# Patient Record
Sex: FEMALE | Race: BLACK OR AFRICAN AMERICAN | NOT HISPANIC OR LATINO | Employment: OTHER | URBAN - METROPOLITAN AREA
[De-identification: names, ages, dates, MRNs, and addresses within clinical notes are randomized per-mention and may not be internally consistent; named-entity substitution may affect disease eponyms.]

---

## 2017-01-04 ENCOUNTER — OFFICE VISIT (OUTPATIENT)
Dept: HEMATOLOGY/ONCOLOGY | Facility: CLINIC | Age: 72
End: 2017-01-04
Payer: MEDICARE

## 2017-01-04 VITALS
HEART RATE: 89 BPM | SYSTOLIC BLOOD PRESSURE: 146 MMHG | BODY MASS INDEX: 24.54 KG/M2 | WEIGHT: 125.69 LBS | OXYGEN SATURATION: 97 % | DIASTOLIC BLOOD PRESSURE: 88 MMHG | TEMPERATURE: 99 F

## 2017-01-04 DIAGNOSIS — D63.1 ANEMIA IN CHRONIC KIDNEY DISEASE: Primary | ICD-10-CM

## 2017-01-04 DIAGNOSIS — N18.9 ANEMIA IN CHRONIC KIDNEY DISEASE: Primary | ICD-10-CM

## 2017-01-04 PROCEDURE — 3079F DIAST BP 80-89 MM HG: CPT | Mod: S$GLB,,, | Performed by: INTERNAL MEDICINE

## 2017-01-04 PROCEDURE — 1159F MED LIST DOCD IN RCRD: CPT | Mod: S$GLB,,, | Performed by: INTERNAL MEDICINE

## 2017-01-04 PROCEDURE — 1157F ADVNC CARE PLAN IN RCRD: CPT | Mod: S$GLB,,, | Performed by: INTERNAL MEDICINE

## 2017-01-04 PROCEDURE — 99999 PR PBB SHADOW E&M-EST. PATIENT-LVL IV: CPT | Mod: PBBFAC,,, | Performed by: INTERNAL MEDICINE

## 2017-01-04 PROCEDURE — 1160F RVW MEDS BY RX/DR IN RCRD: CPT | Mod: S$GLB,,, | Performed by: INTERNAL MEDICINE

## 2017-01-04 PROCEDURE — 1126F AMNT PAIN NOTED NONE PRSNT: CPT | Mod: S$GLB,,, | Performed by: INTERNAL MEDICINE

## 2017-01-04 PROCEDURE — 99213 OFFICE O/P EST LOW 20 MIN: CPT | Mod: S$GLB,,, | Performed by: INTERNAL MEDICINE

## 2017-01-04 PROCEDURE — 99499 UNLISTED E&M SERVICE: CPT | Mod: S$GLB,,, | Performed by: INTERNAL MEDICINE

## 2017-01-04 PROCEDURE — 3077F SYST BP >= 140 MM HG: CPT | Mod: S$GLB,,, | Performed by: INTERNAL MEDICINE

## 2017-01-04 RX ORDER — FEXOFENADINE HCL AND PSEUDOEPHEDRINE HCI 180; 240 MG/1; MG/1
TABLET, EXTENDED RELEASE ORAL
COMMUNITY
Start: 2016-12-13 | End: 2017-03-09 | Stop reason: SDUPTHER

## 2017-01-04 NOTE — PROGRESS NOTES
Subjective:       Patient ID: Regino Lawrence is a 71 y.o. female.    Chief Complaint: Follow-up  Diagnosis: Anemia in CKD  Patient is a Anabaptism  .  HPI    The patient is seen today for chronic anemia in CKD. The patient reports that she has been diagnosed with JOLLY in the past.  She has been on oral iron supplementation therapy, but could not tolerate or did not respond, she is uncertain.  She is followed by GI and has undergone a colonoscopy earlier this year and was diagnosed with hemorrhoids for which she underwent banding procedure.  No melena, hematochezia,change in bowel habits.  She has also been diagnosed with B12 deficiency in the past, but reports she did not respond to B12 injections. No history of blood transfusions.  She is a Anabaptism.  She reports that she remembers getting injections in the 1970s when her blood count was low.         She is followed by  and sp colpocleisis, perineorrhaphy and levatorplasty, transobturator sling and cystoscopy for uterovaginal prolapse    Today, she is doing well.  She continues to undergo weekly Procrit therapy   She undergoes intermittent IV iron therapy  No fatigue  No SOB/CP/NV   She has chronic back pain and mild arthralgias- unchanged    CBC reveals Hb 11.6g/dl    PAST MEDICAL HISTORY:  Acid reflux, alopecia, anemia, anxiety disorder, chronic  kidney disease, depression, diabetes mellitus type 2, hyperlipidemia,  hypertension, hypothyroidism, osteoporosis, sarcoidosis.    PAST SURGICAL HISTORY:  Cholecystectomy, , tubal ligation, carpal tunnel release, cataracts.    FAMILY HISTORY: Unremarkable for cancer. Significant for HTN.       Review of Systems   Constitutional: Negative for activity change, appetite change, chills, diaphoresis and fatigue.   HENT: Negative for hearing loss, nosebleeds and rhinorrhea.    Eyes: Negative for visual disturbance.   Respiratory: Negative for cough, chest tightness, shortness of breath and wheezing.     Cardiovascular: Negative for chest pain, palpitations and leg swelling.   Gastrointestinal: Negative for abdominal distention, abdominal pain, blood in stool, constipation, diarrhea and nausea.   Genitourinary: Positive for difficulty urinating. Negative for flank pain, frequency, hematuria and urgency.        Indwelling cath   Musculoskeletal: Positive for arthralgias and back pain (chronic - stable). Negative for gait problem and neck pain.   Skin: Negative for rash.        No petechiae, ecchymoses   Neurological: Negative for tremors, seizures, syncope, speech difficulty, light-headedness and headaches.   Hematological: Negative for adenopathy. Does not bruise/bleed easily.       Objective:       Vitals:    01/04/17 1458 01/04/17 1503   BP: (!) 140/90 (!) 146/88   BP Location: Left arm Left arm   Patient Position: Sitting Sitting   BP Method: Manual Manual   Pulse: 89    Temp: 98.6 °F (37 °C)    TempSrc: Oral    SpO2: 97%    Weight: 57 kg (125 lb 10.6 oz)        Physical Exam   Constitutional: She is oriented to person, place, and time. She appears well-developed and well-nourished.   HENT:   Head: Normocephalic.   Mouth/Throat: Oropharynx is clear and moist. No oropharyngeal exudate.   Eyes: Conjunctivae and lids are normal. Pupils are equal, round, and reactive to light. No scleral icterus.   Neck: Normal range of motion. Neck supple. No thyromegaly present.   Cardiovascular: Normal rate, regular rhythm and normal heart sounds.    No murmur heard.  Pulmonary/Chest: Breath sounds normal. She has no wheezes. She has no rales.   Abdominal: Soft. Bowel sounds are normal. She exhibits no distension and no mass. There is no hepatosplenomegaly. There is no tenderness. There is no rebound and no guarding.   Musculoskeletal: Normal range of motion. She exhibits no edema or tenderness.   Lymphadenopathy:     She has no cervical adenopathy.     She has no axillary adenopathy.        Right: No supraclavicular adenopathy  present.        Left: No supraclavicular adenopathy present.   Neurological: She is alert and oriented to person, place, and time. No cranial nerve deficit. Coordination normal.   Skin: Skin is warm and dry. No ecchymosis, no petechiae and no rash noted. No erythema.   Psychiatric: She has a normal mood and affect.         Results for CANDELARIA SANTANA (MRN [id number]) as of 8/28/2016 14:24   Ref. Range 10/14/2015 08:18 12/8/2015 11:33 3/7/2016 12:08   Vitamin B-12 Latest Ref Range: 210 - 950 pg/mL [phone]       Results for CANDELARIA SANTANA (MRN [id number]) as of 9/22/2016 10:20   Ref. Range 8/17/2016 14:30   Retic Latest Ref Range: 0.5 - 2.5 % 2.8 (H)   Sed Rate Latest Ref Range: 0 - 20 mm/Hr 31 (H)     Lab Results   Component Value Date    WBC 6.17 12/23/2016    HGB 11.6 (L) 12/23/2016    HCT 36.4 (L) 12/23/2016    MCV 98 12/23/2016     12/23/2016     Lab Results   Component Value Date    IRON 50 12/23/2016    TIBC 426 12/23/2016    FERRITIN 113 12/23/2016     SPEP-nl    Results for CANDELARIA SANTANA (MRN [id number]) as of 9/22/2016 10:20   Ref. Range 8/17/2016 14:30   [place]wey Free Light Chains Latest Ref Range: 0.33 - 1.94 mg/dL 5.75 (H)   Lambda Free Light Chains Latest Ref Range: 0.57 - 2.63 mg/dL 4.55 (H)   Kappa/Lambda FLC Ratio Latest Ref Range: 0.26 - 1.65  1.26       Results for CANDELARIA SANTANA (MRN [id number]) as of 1/4/2017 15:14   Ref. Range 12/16/2016 10:14   Sed Rate Latest Ref Range: 0 - 20 mm/Hr 43 (H)     Results for CANDELARIA SANTANA (MRN [id number]) as of 1/4/2017 15:14   Ref. Range 12/23/2016 10:20   Iron Latest Ref Range: 30 - 160 ug/dL 50   TIBC Latest Ref Range: 250 - 450 ug/dL 426   Saturated Iron Latest Ref Range: 20 - 50 % 12 (L)   Transferrin Latest Ref Range: 200 - 375 mg/dL 288   Ferritin Latest Ref Range: 20.0 - 300.0 ng/mL 113         Assessment:       1. Anemia in chronic kidney disease    2. Patient is Rastafarian        Plan:   1.,2. Pt clinically stable  S/p IV Iron therapy   Hb  11.6g/dl  Ferritin 113  Pt is a Yazidi and declines/not interested in blood and blood products due to Voodoo beliefs  Plan change frequency of   Procrit therapy to 10,000u q 2wks ( pending lab parameters)  CBC q 2wks  F/u 2 mos with Fe studies        CC: Micaela Mendoza M.D.

## 2017-01-04 NOTE — MR AVS SNAPSHOT
[facility]  [address] [place] [address]  Phone: [phone]                  Regino Lawrence   2017 3:00 PM   Office Visit    Description:  Female : 1945   Provider:  Edith Pacheco MD   [facility]           Reason for Visit     Follow-up           Diagnoses this Visit        Comments    Anemia in chronic kidney disease    -  Primary     Patient is Protestant                To Do List           Future Appointments        Provider Department Dept Phone    2017 10:30 AM CHAIR 01 [facility] [phone]    2017 8:00 AM [facility] US 11 [facility] [phone]    2017 10:00 AM Lindsey Jenkins NP [facility] [phone]    3/6/2017 10:00 AM [facility] [phone]    3/9/2017 10:30 AM Edith [facility] [phone]      Goals (5 Years of Data)              11/29/16    10/17/16    3/7/16    COMPLETED: HDL > 40           Related Problems    Combined 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    HEMOGLOBIN A1C < 7.0   5.6  5.6  5.7    Related Problems    Diabetes mellitus type 2, controlled    COMPLETED: LDL CHOLESTEROL < 70           Related Problems    Combined 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      Follow-Up and Disposition     Return in about 2 months (around 3/4/2017).      [facility]     [facility] -  Assistance  Registered nurses in the [facility] provide clinical advisement, health education, appointment booking, and other advisory services.  Call for this free service at [phone].             Medications           Message regarding Medications     Verify the changes and/or additions to your medication regime listed below are the same as discussed with your clinician today.  If any of these  changes or additions are incorrect, please notify your healthcare provider.             Verify that the below list of medications is an accurate representation of the medications you are currently taking.  If none reported, the list may be blank. If incorrect, please contact your healthcare provider. Carry this list with you in case of emergency.           Current Medications     ACCU-CHEK FASTCLIX Kit Use as directed.    ALCOHOL ANTISEPTIC PADS (ALCOHOL PREP PADS TOP)     blood sugar diagnostic Strp 1 each by Misc.(Non-Drug; Combo Route) route once daily.    blood-glucose meter kit Use as instructed    calcium carbonate (OS-MALCOLM) 500 mg calcium (1,250 mg) tablet Take 1 tablet (500 mg total) by mouth 2 (two) times daily.    cloNIDine (CATAPRES) 0.3 MG tablet Take 1 tablet (0.3 mg total) by mouth 3 (three) times daily.    cyclobenzaprine (FLEXERIL) 10 MG tablet TAKE 1 TABLET THREE TIMES DAILY    fenofibrate 160 MG Tab TAKE 1 TABLET ONE TIME DAILY    fexofenadine-pseudoephedrine (ALLEGRA-D 24) 180-240 mg per 24 hr tablet     fexofenadine-pseudoephedrine (ALLEGRA-D 24) 180-240 mg per 24 hr tablet     gabapentin (NEURONTIN) 400 MG capsule Take 1 capsule (400 mg total) by mouth every evening.    lancing device (ACCU-CHEK SOFTCLIX LANCET DEV) Misc Accu-chek FastClix kit    levothyroxine (SYNTHROID) 50 MCG tablet TAKE 1 TABLET (50 MCG TOTAL) BY MOUTH BEFORE BREAKFAST.    lisinopril (PRINIVIL,ZESTRIL) 20 MG tablet TAKE 1 TABLET ONE TIME DAILY    metformin (GLUCOPHAGE) 1000 MG tablet Take 1 tablet (1,000 mg total) by mouth 2 (two) times daily with meals.    METHYL SALICYLATE/MENTH/CAMPH (MUSCLE RUB, WITH CAMPHOR, TOP)     nifedipine (NIFEDICAL XL) 60 MG (OSM) 24 hr tablet Take 1 tablet (60 mg total) by mouth once daily.    ondansetron (ZOFRAN) 8 MG tablet Take 1 tablet (8 mg total) by mouth every 12 (twelve) hours as needed for Nausea.    potassium chloride SA (K-DUR,KLOR-CON) 20 MEQ tablet Take 1 tablet (20 mEq total) by  mouth 2 (two) times daily.    predniSONE (DELTASONE) 10 MG tablet Take 1 tablet (10 mg total) by mouth once daily.    senna-docusate 8.6-50 mg (PERICOLACE) 8.6-50 mg per tablet Take 1 tablet by mouth 2 (two) times daily as needed for Constipation.    tramadol (ULTRAM) 50 mg tablet Take 1 tablet (50 mg total) by mouth every 8 (eight) hours as needed.    conjugated estrogens (PREMARIN) vaginal cream Place 0.5 g vaginally 3 (three) times a week. Apply by fingertip application           Clinical Reference Information           Vital Signs - Last Recorded  Most recent update: 1/4/2017  3:03 PM by Iris Hernández LPN    BP Pulse Temp Wt LMP SpO2    (!) 146/88 (BP Location: Left arm, Patient Position: Sitting, BP Method: Manual) 89 98.6 °F (37 °C) (Oral) 57 kg (125 lb 10.6 oz) (LMP Unknown) 97%    BMI                24.54 kg/m2          Blood Pressure          Most Recent Value    BP  (!)  146/88      Allergies as of 1/4/2017     Azathioprine      Immunizations Administered on Date of Encounter - 1/4/2017     None      Orders Placed During Today's Visit     Future Labs/Procedures Expected by Expires    Ferritin  1/4/2017 1/4/2018    Iron and TIBC  1/4/2017 1/4/2018

## 2017-01-12 ENCOUNTER — HOSPITAL ENCOUNTER (OUTPATIENT)
Dept: RADIOLOGY | Facility: HOSPITAL | Age: 72
Discharge: HOME | End: 2017-01-12
Attending: OBSTETRICS & GYNECOLOGY
Payer: MEDICARE

## 2017-01-12 DIAGNOSIS — N95.0 PMB (POSTMENOPAUSAL BLEEDING): ICD-10-CM

## 2017-01-12 PROCEDURE — 76856 US EXAM PELVIC COMPLETE: CPT | Mod: 26,,, | Performed by: RADIOLOGY

## 2017-01-12 PROCEDURE — 76830 TRANSVAGINAL US NON-OB: CPT | Mod: 26,,, | Performed by: RADIOLOGY

## 2017-01-12 PROCEDURE — 76856 US EXAM PELVIC COMPLETE: CPT | Mod: TC

## 2017-01-13 ENCOUNTER — OFFICE VISIT (OUTPATIENT)
Dept: UROGYNECOLOGY | Facility: CLINIC | Age: 72
End: 2017-01-13
Attending: OBSTETRICS & GYNECOLOGY
Payer: MEDICARE

## 2017-01-13 ENCOUNTER — INFUSION (OUTPATIENT)
Dept: INFUSION THERAPY | Facility: HOSPITAL | Age: 72
End: 2017-01-13
Attending: INTERNAL MEDICINE
Payer: MEDICARE

## 2017-01-13 VITALS — DIASTOLIC BLOOD PRESSURE: 73 MMHG | RESPIRATION RATE: 16 BRPM | SYSTOLIC BLOOD PRESSURE: 117 MMHG | HEART RATE: 84 BPM

## 2017-01-13 VITALS
HEIGHT: 60 IN | DIASTOLIC BLOOD PRESSURE: 70 MMHG | WEIGHT: 124.56 LBS | BODY MASS INDEX: 24.45 KG/M2 | SYSTOLIC BLOOD PRESSURE: 142 MMHG

## 2017-01-13 DIAGNOSIS — N18.9 ANEMIA IN CHRONIC KIDNEY DISEASE: Primary | ICD-10-CM

## 2017-01-13 DIAGNOSIS — Z98.890 POST-OPERATIVE STATE: Primary | ICD-10-CM

## 2017-01-13 DIAGNOSIS — N39.41 URGE INCONTINENCE: ICD-10-CM

## 2017-01-13 DIAGNOSIS — N95.2 VAGINAL ATROPHY: ICD-10-CM

## 2017-01-13 DIAGNOSIS — Z53.1 REFUSAL OF BLOOD TRANSFUSIONS AS PATIENT IS JEHOVAH'S WITNESS: ICD-10-CM

## 2017-01-13 DIAGNOSIS — D63.1 ANEMIA IN CHRONIC KIDNEY DISEASE: Primary | ICD-10-CM

## 2017-01-13 LAB
BILIRUB SERPL-MCNC: ABNORMAL MG/DL
BLOOD URINE, POC: ABNORMAL
COLOR, POC UA: YELLOW
GLUCOSE UR QL STRIP: 50
KETONES UR QL STRIP: ABNORMAL
LEUKOCYTE ESTERASE URINE, POC: ABNORMAL
NITRITE, POC UA: ABNORMAL
PH, POC UA: 5
PROTEIN, POC: ABNORMAL
SPECIFIC GRAVITY, POC UA: 1.01
UROBILINOGEN, POC UA: ABNORMAL

## 2017-01-13 PROCEDURE — 63600175 PHARM REV CODE 636 W HCPCS: Performed by: INTERNAL MEDICINE

## 2017-01-13 PROCEDURE — 96372 THER/PROPH/DIAG INJ SC/IM: CPT

## 2017-01-13 PROCEDURE — 81001 URINALYSIS AUTO W/SCOPE: CPT | Mod: S$GLB,,, | Performed by: NURSE PRACTITIONER

## 2017-01-13 PROCEDURE — 99999 PR PBB SHADOW E&M-EST. PATIENT-LVL IV: CPT | Mod: PBBFAC,,, | Performed by: NURSE PRACTITIONER

## 2017-01-13 PROCEDURE — 99024 POSTOP FOLLOW-UP VISIT: CPT | Mod: S$GLB,,, | Performed by: NURSE PRACTITIONER

## 2017-01-13 PROCEDURE — 51701 INSERT BLADDER CATHETER: CPT | Mod: S$GLB,,, | Performed by: NURSE PRACTITIONER

## 2017-01-13 RX ADMIN — ERYTHROPOIETIN 10000 UNITS: 10000 INJECTION, SOLUTION INTRAVENOUS; SUBCUTANEOUS at 11:01

## 2017-01-13 NOTE — PROGRESS NOTES
Urogyn follow up    .  [facility]  [address] [facility] [address]    Regino Lawrence  [id number]  1945      Regino Lawrence is a 71 y.o.  here for a post op visit    PROCEDURE DATE: 2016        PROCEDURE: [place] Colpocleisis, Perineorrhaphy and Levatorplasty , Trans-oburator  sling, Cystoscopy    Past Medical History   Diagnosis Date    Acid reflux     Allergy     Alopecia     Anemia     Anxiety     Arthritis     Cataract     Chronic kidney disease     Depression     Diabetes mellitus, type 2     Eye injury as a child      k-abrasion  od    Hyperlipidemia     Hypertension     Hypothyroidism     Myalgia and myositis 2012    Osteoporosis     Sarcoidosis     Ulcer      no cancer       Past Surgical History   Procedure Laterality Date    Cholecystectomy       section      Tubal ligation      Carpal tunnel release       Rt wrist    Cataract extraction w/  intraocular lens implant Right 2015     Dr. Azevedo    Cataract extraction w/  intraocular lens implant Left 2015     Dr. Azevedo     History since last visit: Denies pain, bleeding, or discharge.  Bladder issues: Denies GISSELLE.  Rare UUI if waits to use the restroom.   Bowel issues: Denies constipation or straining.    Current Outpatient Prescriptions   Medication Sig    ACCU-CHEK FASTCLIX Kit Use as directed.    ALCOHOL ANTISEPTIC PADS (ALCOHOL PREP PADS TOP)     blood sugar diagnostic Strp 1 each by Misc.(Non-Drug; Combo Route) route once daily.    blood-glucose meter kit Use as instructed    cyclobenzaprine (FLEXERIL) 10 MG tablet TAKE 1 TABLET THREE TIMES DAILY    fenofibrate 160 MG Tab TAKE 1 TABLET ONE TIME DAILY    fexofenadine-pseudoephedrine (ALLEGRA-D 24) 180-240 mg per 24 hr tablet     fexofenadine-pseudoephedrine (ALLEGRA-D 24) 180-240 mg per 24 hr tablet     lancing device (ACCU-CHEK SOFTCLIX LANCET DEV) Misc Accu-chek FastClix kit     levothyroxine (SYNTHROID) 50 MCG tablet TAKE 1 TABLET (50 MCG TOTAL) BY MOUTH BEFORE BREAKFAST.    lisinopril (PRINIVIL,ZESTRIL) 20 MG tablet TAKE 1 TABLET ONE TIME DAILY    metformin (GLUCOPHAGE) 1000 MG tablet Take 1 tablet (1,000 mg total) by mouth 2 (two) times daily with meals.    METHYL SALICYLATE/MENTH/CAMPH (MUSCLE RUB, WITH CAMPHOR, TOP)     nifedipine (NIFEDICAL XL) 60 MG (OSM) 24 hr tablet Take 1 tablet (60 mg total) by mouth once daily.    ondansetron (ZOFRAN) 8 MG tablet Take 1 tablet (8 mg total) by mouth every 12 (twelve) hours as needed for Nausea.    potassium chloride SA (K-DUR,KLOR-CON) 20 MEQ tablet Take 1 tablet (20 mEq total) by mouth 2 (two) times daily. (Patient taking differently: Take 20 mEq by mouth once daily. )    predniSONE (DELTASONE) 10 MG tablet Take 1 tablet (10 mg total) by mouth once daily.    senna-docusate 8.6-50 mg (PERICOLACE) 8.6-50 mg per tablet Take 1 tablet by mouth 2 (two) times daily as needed for Constipation.    tramadol (ULTRAM) 50 mg tablet Take 1 tablet (50 mg total) by mouth every 8 (eight) hours as needed.    calcium carbonate (OS-MALCOLM) 500 mg calcium (1,250 mg) tablet Take 1 tablet (500 mg total) by mouth 2 (two) times daily.    cloNIDine (CATAPRES) 0.3 MG tablet Take 1 tablet (0.3 mg total) by mouth 3 (three) times daily.    conjugated estrogens (PREMARIN) vaginal cream Place 0.5 g vaginally 3 (three) times a week. Apply by fingertip application    gabapentin (NEURONTIN) 400 MG capsule Take 1 capsule (400 mg total) by mouth every evening.     No current facility-administered medications for this visit.      ROS:  As per HPI.      Exam  Visit Vitals    BP (!) 142/70    Ht 5' (1.524 m)    Wt 56.5 kg (124 lb 9 oz)    LMP  (LMP Unknown)    BMI 24.33 kg/m2     General: alert and oriented, no acute distress  Respiratory: normal respiratory effort  Abd: soft, non-tender, non-distended    Pelvic  Ext. Genitalia: normal external genitalia. Normal  bartholin's and skeens glands  Vagina: + atrophy. Normal vaginal mucosa without lesions. No discharge noted.   Non-tender bladder base without palpable mass.  A/p colpocleisis.  Well healed.   Urethra: no masses or tenderness  Urethral meatus: no lesions, caruncle or prolapse.    POP-Q:  No obvious prolapse    PVR 5 cc    Impression  1. Post-operative state     2. Urge incontinence  POCT urinalysis, dipstick or tablet reag   3. Vaginal atrophy       We reviewed the above issues and discussed options for short-term versus long-term management of her problems.   Plan:   1. Post op healing well.   2. Continue to control bowel movements.  3. Cystic structure within the left adnexa-- will repeat ultrasound in one year  4. She will follow up with us in 6 months.  30 minutes were spent in face to face time with this patient  75 % of this time was spent in counseling and/or coordination of care    Lindsey Jenkins, CARLOS-BC [facility]  Division of Female Pelvic Medicine and Reconstructive Surgery  Department of Obstetrics & Gynecology

## 2017-01-13 NOTE — PATIENT INSTRUCTIONS
1. Post op healing well.   2. Continue to control bowel movements.  3. Cystic structure within the left adnexa-- will repeat ultrasound in one year  4. She will follow up with us in 6 months.

## 2017-01-13 NOTE — MR AVS SNAPSHOT
[facility]  [address] [facility] [address]  Phone: [phone]                  Regino Lawrence   2017 1:00 PM   Office Visit    Description:  Female : 1945   Provider:  Lindsey Jenkins NP   Department:  [facility]           Reason for Visit     Follow-up                To Do List           Future Appointments        Provider Department Dept Phone    2017 9:50 AM [facility] [phone]    2017 10:00 AM CHAIR 02 [facility] [phone]    3/6/2017 10:00 AM [facility] [phone]    3/9/2017 10:30 AM Ed[facility]co [facility] [phone]    2017 11:00 AM Leti Ruggiero MD [facility] [phone]      Goals (5 Years of Data)              11/29/16    10/17/16    3/7/16    COMPLETED: HDL > 40           Related Problems    Combined 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    HEMOGLOBIN A1C < 7.0   5.6  5.6  5.7    Related Problems    Diabetes mellitus type 2, controlled    COMPLETED: LDL CHOLESTEROL < 70           Related Problems    Combined 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      [facility]     [facility] Care [facility] - / Assistance  Registered nurses in the [facility] provide clinical advisement, health education, appointment booking, and other advisory services.  Call for this free service at [phone].             Medications           Message regarding Medications     Verify the changes and/or additions to your medication regime listed below are the same as discussed with your clinician today.  If any of these changes or additions are incorrect, please notify your healthcare provider.             Verify that the below list of medications is an accurate representation of the medications you are currently taking.  If  none reported, the list may be blank. If incorrect, please contact your healthcare provider. Carry this list with you in case of emergency.           Current Medications     ACCU-CHEK FASTCLIX Kit Use as directed.    ALCOHOL ANTISEPTIC PADS (ALCOHOL PREP PADS TOP)     blood sugar diagnostic Strp 1 each by Misc.(Non-Drug; Combo Route) route once daily.    blood-glucose meter kit Use as instructed    calcium carbonate (OS-MALCOLM) 500 mg calcium (1,250 mg) tablet Take 1 tablet (500 mg total) by mouth 2 (two) times daily.    cloNIDine (CATAPRES) 0.3 MG tablet Take 1 tablet (0.3 mg total) by mouth 3 (three) times daily.    conjugated estrogens (PREMARIN) vaginal cream Place 0.5 g vaginally 3 (three) times a week. Apply by fingertip application    cyclobenzaprine (FLEXERIL) 10 MG tablet TAKE 1 TABLET THREE TIMES DAILY    fenofibrate 160 MG Tab TAKE 1 TABLET ONE TIME DAILY    fexofenadine-pseudoephedrine (ALLEGRA-D 24) 180-240 mg per 24 hr tablet     fexofenadine-pseudoephedrine (ALLEGRA-D 24) 180-240 mg per 24 hr tablet     gabapentin (NEURONTIN) 400 MG capsule Take 1 capsule (400 mg total) by mouth every evening.    lancing device (ACCU-CHEK SOFTCLIX LANCET DEV) Misc Accu-chek FastClix kit    levothyroxine (SYNTHROID) 50 MCG tablet TAKE 1 TABLET (50 MCG TOTAL) BY MOUTH BEFORE BREAKFAST.    lisinopril (PRINIVIL,ZESTRIL) 20 MG tablet TAKE 1 TABLET ONE TIME DAILY    metformin (GLUCOPHAGE) 1000 MG tablet Take 1 tablet (1,000 mg total) by mouth 2 (two) times daily with meals.    METHYL SALICYLATE/MENTH/CAMPH (MUSCLE RUB, WITH CAMPHOR, TOP)     nifedipine (NIFEDICAL XL) 60 MG (OSM) 24 hr tablet Take 1 tablet (60 mg total) by mouth once daily.    ondansetron (ZOFRAN) 8 MG tablet Take 1 tablet (8 mg total) by mouth every 12 (twelve) hours as needed for Nausea.    potassium chloride SA (K-DUR,KLOR-CON) 20 MEQ tablet Take 1 tablet (20 mEq total) by mouth 2 (two) times daily.    predniSONE (DELTASONE) 10 MG tablet Take 1 tablet (10 mg  total) by mouth once daily.    senna-docusate 8.6-50 mg (PERICOLACE) 8.6-50 mg per tablet Take 1 tablet by mouth 2 (two) times daily as needed for Constipation.    tramadol (ULTRAM) 50 mg tablet Take 1 tablet (50 mg total) by mouth every 8 (eight) hours as needed.           Clinical Reference Information           Vital Signs - Last Recorded  Most recent update: 1/13/2017  1:22 PM by Lilliana Gamble MA    BP Ht Wt LMP BMI    (!) 142/70 5' (1.524 m) 56.5 kg (124 lb 9 oz) (LMP Unknown) 24.33 kg/m2      Blood Pressure          Most Recent Value    BP  (!)  142/70      Allergies as of 1/13/2017     Azathioprine      Immunizations Administered on Date of Encounter - 1/13/2017     None      Instructions    1. Post op healing well.   2. Continue to control bowel movements.  3. Cystic structure within the left adnexa-- will repeat ultrasound in one year  4. She will follow up with us in 6 months.

## 2017-01-15 ENCOUNTER — PATIENT MESSAGE (OUTPATIENT)
Dept: UROGYNECOLOGY | Facility: CLINIC | Age: 72
End: 2017-01-15

## 2017-01-15 DIAGNOSIS — N94.9 ADNEXAL CYST: Primary | ICD-10-CM

## 2017-01-15 NOTE — TELEPHONE ENCOUNTER
Pelvic sonogram was ordered prior to the Colpocleisis to evaluate the uterus, patient was not able to have this done prior to surgery. Results reviewed,?  left adnexal cyst.  will recommend follow up in 3 months. Ordered.

## 2017-01-27 ENCOUNTER — INFUSION (OUTPATIENT)
Dept: INFUSION THERAPY | Facility: HOSPITAL | Age: 72
End: 2017-01-27
Attending: INTERNAL MEDICINE
Payer: MEDICARE

## 2017-01-27 DIAGNOSIS — D63.1 ANEMIA IN CHRONIC KIDNEY DISEASE: Primary | ICD-10-CM

## 2017-01-27 DIAGNOSIS — Z53.1 REFUSAL OF BLOOD TRANSFUSIONS AS PATIENT IS JEHOVAH'S WITNESS: ICD-10-CM

## 2017-01-27 DIAGNOSIS — N18.9 ANEMIA IN CHRONIC KIDNEY DISEASE: Primary | ICD-10-CM

## 2017-01-27 PROCEDURE — 96372 THER/PROPH/DIAG INJ SC/IM: CPT

## 2017-01-27 PROCEDURE — 63600175 PHARM REV CODE 636 W HCPCS: Performed by: INTERNAL MEDICINE

## 2017-01-27 RX ADMIN — ERYTHROPOIETIN 10000 UNITS: 10000 INJECTION, SOLUTION INTRAVENOUS; SUBCUTANEOUS at 11:01

## 2017-02-10 ENCOUNTER — DOCUMENTATION ONLY (OUTPATIENT)
Dept: INFUSION THERAPY | Facility: HOSPITAL | Age: 72
End: 2017-02-10

## 2017-02-10 ENCOUNTER — LAB VISIT (OUTPATIENT)
Dept: LAB | Facility: HOSPITAL | Age: 72
End: 2017-02-10
Attending: INTERNAL MEDICINE
Payer: MEDICARE

## 2017-02-10 DIAGNOSIS — N18.9 ANEMIA IN CHRONIC KIDNEY DISEASE: ICD-10-CM

## 2017-02-10 DIAGNOSIS — Z53.1 REFUSAL OF BLOOD TRANSFUSIONS AS PATIENT IS JEHOVAH'S WITNESS: ICD-10-CM

## 2017-02-10 DIAGNOSIS — D63.1 ANEMIA IN CHRONIC KIDNEY DISEASE: ICD-10-CM

## 2017-02-10 LAB
BASOPHILS # BLD AUTO: 0.01 K/UL
BASOPHILS NFR BLD: 0.1 %
DIFFERENTIAL METHOD: ABNORMAL
EOSINOPHIL # BLD AUTO: 0 K/UL
EOSINOPHIL NFR BLD: 0.4 %
ERYTHROCYTE [DISTWIDTH] IN BLOOD BY AUTOMATED COUNT: 13.6 %
HCT VFR BLD AUTO: 35.4 %
HGB BLD-MCNC: 11.6 G/DL
LYMPHOCYTES # BLD AUTO: 2.1 K/UL
LYMPHOCYTES NFR BLD: 26 %
MCH RBC QN AUTO: 32 PG
MCHC RBC AUTO-ENTMCNC: 32.8 %
MCV RBC AUTO: 98 FL
MONOCYTES # BLD AUTO: 0.5 K/UL
MONOCYTES NFR BLD: 6.8 %
NEUTROPHILS # BLD AUTO: 5.3 K/UL
NEUTROPHILS NFR BLD: 66.7 %
PLATELET # BLD AUTO: 283 K/UL
PMV BLD AUTO: 8.9 FL
RBC # BLD AUTO: 3.62 M/UL
WBC # BLD AUTO: 7.99 K/UL

## 2017-02-10 PROCEDURE — 36415 COLL VENOUS BLD VENIPUNCTURE: CPT

## 2017-02-10 PROCEDURE — 85025 COMPLETE CBC W/AUTO DIFF WBC: CPT

## 2017-02-20 ENCOUNTER — TELEPHONE (OUTPATIENT)
Dept: NEUROSURGERY | Facility: CLINIC | Age: 72
End: 2017-02-20

## 2017-02-20 DIAGNOSIS — M48.02 CERVICAL SPINAL STENOSIS: Primary | ICD-10-CM

## 2017-02-20 DIAGNOSIS — E11.9 DIABETES MELLITUS TYPE 2, CONTROLLED: Chronic | ICD-10-CM

## 2017-02-20 RX ORDER — LANCING DEVICE/LANCETS
KIT MISCELLANEOUS
Qty: 1 EACH | Refills: 0 | Status: SHIPPED | OUTPATIENT
Start: 2017-02-20 | End: 2017-03-28 | Stop reason: SDUPTHER

## 2017-02-28 ENCOUNTER — PATIENT MESSAGE (OUTPATIENT)
Dept: FAMILY MEDICINE | Facility: CLINIC | Age: 72
End: 2017-02-28

## 2017-02-28 DIAGNOSIS — I10 ESSENTIAL HYPERTENSION: Chronic | ICD-10-CM

## 2017-03-01 RX ORDER — CYCLOBENZAPRINE HCL 10 MG
10 TABLET ORAL 3 TIMES DAILY
Qty: 180 TABLET | Refills: 1 | Status: SHIPPED | OUTPATIENT
Start: 2017-03-01 | End: 2017-12-05 | Stop reason: SDUPTHER

## 2017-03-01 RX ORDER — CLONIDINE HYDROCHLORIDE 0.3 MG/1
0.3 TABLET ORAL 3 TIMES DAILY
Qty: 270 TABLET | Refills: 1 | Status: SHIPPED | OUTPATIENT
Start: 2017-03-01 | End: 2018-03-22 | Stop reason: SDUPTHER

## 2017-03-06 ENCOUNTER — HOSPITAL ENCOUNTER (EMERGENCY)
Facility: OTHER | Age: 72
Discharge: HOME | End: 2017-03-06
Attending: EMERGENCY MEDICINE
Payer: MEDICARE

## 2017-03-06 ENCOUNTER — PATIENT MESSAGE (OUTPATIENT)
Dept: RHEUMATOLOGY | Facility: CLINIC | Age: 72
End: 2017-03-06

## 2017-03-06 ENCOUNTER — LAB VISIT (OUTPATIENT)
Dept: LAB | Facility: HOSPITAL | Age: 72
End: 2017-03-06
Attending: INTERNAL MEDICINE
Payer: MEDICARE

## 2017-03-06 ENCOUNTER — PATIENT MESSAGE (OUTPATIENT)
Dept: FAMILY MEDICINE | Facility: CLINIC | Age: 72
End: 2017-03-06

## 2017-03-06 VITALS
DIASTOLIC BLOOD PRESSURE: 95 MMHG | TEMPERATURE: 98 F | WEIGHT: 128 LBS | OXYGEN SATURATION: 100 % | HEIGHT: 60 IN | SYSTOLIC BLOOD PRESSURE: 170 MMHG | HEART RATE: 112 BPM | BODY MASS INDEX: 25.13 KG/M2 | RESPIRATION RATE: 18 BRPM

## 2017-03-06 DIAGNOSIS — N18.9 ANEMIA IN CHRONIC KIDNEY DISEASE: ICD-10-CM

## 2017-03-06 DIAGNOSIS — D63.1 ANEMIA IN CHRONIC KIDNEY DISEASE: ICD-10-CM

## 2017-03-06 DIAGNOSIS — Z53.1 REFUSAL OF BLOOD TRANSFUSIONS AS PATIENT IS JEHOVAH'S WITNESS: ICD-10-CM

## 2017-03-06 DIAGNOSIS — M54.50 LEFT-SIDED LOW BACK PAIN WITHOUT SCIATICA, UNSPECIFIED CHRONICITY: Primary | ICD-10-CM

## 2017-03-06 DIAGNOSIS — I10 ESSENTIAL HYPERTENSION: Chronic | ICD-10-CM

## 2017-03-06 DIAGNOSIS — R10.9 LEFT FLANK PAIN: ICD-10-CM

## 2017-03-06 LAB
ALBUMIN SERPL-MCNC: 3.4 G/DL (ref 3.3–5.5)
ALP SERPL-CCNC: 94 U/L (ref 42–141)
BASOPHILS # BLD AUTO: 0.01 K/UL
BASOPHILS NFR BLD: 0.1 %
BILIRUB SERPL-MCNC: 0.5 MG/DL (ref 0.2–1.6)
BILIRUB SERPL-MCNC: NEGATIVE MG/DL
BLOOD, POC UA: NORMAL
BUN SERPL-MCNC: 15 MG/DL (ref 7–22)
CALCIUM SERPL-MCNC: 9.5 MG/DL (ref 8–10.3)
CHLORIDE SERPL-SCNC: 98 MMOL/L (ref 98–108)
CLARITY, POC UA: NORMAL
COLOR, POC UA: NORMAL
CREAT SERPL-MCNC: 1.1 MG/DL (ref 0.6–1.2)
DIFFERENTIAL METHOD: ABNORMAL
EOSINOPHIL # BLD AUTO: 0.1 K/UL
EOSINOPHIL NFR BLD: 0.7 %
ERYTHROCYTE [DISTWIDTH] IN BLOOD BY AUTOMATED COUNT: 13.1 %
FERRITIN SERPL-MCNC: 124 NG/ML
GLUCOSE SERPL-MCNC: 102 MG/DL (ref 73–118)
GLUCOSE SERPL-MCNC: NEGATIVE MG/DL (ref 70–110)
HCT VFR BLD AUTO: 31.1 %
HGB BLD-MCNC: 9.9 G/DL
IRON SERPL-MCNC: 67 UG/DL
LEUKOCYTE EST, POC UA: NEGATIVE
LYMPHOCYTES # BLD AUTO: 2.3 K/UL
LYMPHOCYTES NFR BLD: 32.4 %
MCH RBC QN AUTO: 31.1 PG
MCHC RBC AUTO-ENTMCNC: 31.8 %
MCV RBC AUTO: 98 FL
MONOCYTES # BLD AUTO: 0.6 K/UL
MONOCYTES NFR BLD: 7.9 %
NEUTROPHILS # BLD AUTO: 4.2 K/UL
NEUTROPHILS NFR BLD: 58.2 %
NITRITE, POC UA: NEGATIVE
PH SMN: 7 [PH]
PLATELET # BLD AUTO: 307 K/UL
PMV BLD AUTO: 8.7 FL
POC ALT (SGPT): 21 (ref 10–47)
POC AST (SGOT): 19 (ref 11–38)
POC KETONES, BLOOD: NEGATIVE
POC TCO2: 29 (ref 18–33)
POTASSIUM BLD-SCNC: 3.5 MMOL/L (ref 3.6–5.1)
PROTEIN, POC: 7.6 (ref 6.4–8.1)
PROTEIN, POC: NEGATIVE
RBC # BLD AUTO: 3.18 M/UL
SATURATED IRON: 16 %
SODIUM BLD-SCNC: 143 MMOL/L (ref 128–145)
SPECIFIC GRAVITY, POC UA: 1.02
TOTAL IRON BINDING CAPACITY: 417 UG/DL
TRANSFERRIN SERPL-MCNC: 282 MG/DL
UROBILINOGEN, POC UA: 0.2 E.U./DL
WBC # BLD AUTO: 7.23 K/UL

## 2017-03-06 PROCEDURE — 85025 COMPLETE CBC W/AUTO DIFF WBC: CPT

## 2017-03-06 PROCEDURE — 80053 COMPREHEN METABOLIC PANEL: CPT

## 2017-03-06 PROCEDURE — 36415 COLL VENOUS BLD VENIPUNCTURE: CPT

## 2017-03-06 PROCEDURE — 81003 URINALYSIS AUTO W/O SCOPE: CPT

## 2017-03-06 PROCEDURE — 83540 ASSAY OF IRON: CPT

## 2017-03-06 PROCEDURE — 82728 ASSAY OF FERRITIN: CPT

## 2017-03-06 PROCEDURE — 25000003 PHARM REV CODE 250: Performed by: EMERGENCY MEDICINE

## 2017-03-06 PROCEDURE — 99284 EMERGENCY DEPT VISIT MOD MDM: CPT

## 2017-03-06 RX ORDER — CYCLOBENZAPRINE HCL 10 MG
TABLET ORAL
Qty: 270 TABLET | Refills: 1 | Status: SHIPPED | OUTPATIENT
Start: 2017-03-06 | End: 2017-03-09 | Stop reason: SDUPTHER

## 2017-03-06 RX ORDER — NIFEDIPINE 60 MG/1
60 TABLET, EXTENDED RELEASE ORAL DAILY
Qty: 90 TABLET | Refills: 1 | Status: SHIPPED | OUTPATIENT
Start: 2017-03-06 | End: 2017-07-03

## 2017-03-06 RX ORDER — OXYCODONE AND ACETAMINOPHEN 5; 325 MG/1; MG/1
1 TABLET ORAL EVERY 4 HOURS PRN
Qty: 10 TABLET | Refills: 0 | Status: SHIPPED | OUTPATIENT
Start: 2017-03-06 | End: 2017-05-05

## 2017-03-06 RX ORDER — DIAZEPAM 5 MG/1
5 TABLET ORAL EVERY 6 HOURS PRN
Qty: 15 TABLET | Refills: 0 | Status: SHIPPED | OUTPATIENT
Start: 2017-03-06 | End: 2017-09-11 | Stop reason: ALTCHOICE

## 2017-03-06 RX ORDER — OXYCODONE AND ACETAMINOPHEN 5; 325 MG/1; MG/1
2 TABLET ORAL
Status: COMPLETED | OUTPATIENT
Start: 2017-03-06 | End: 2017-03-06

## 2017-03-06 RX ORDER — DIAZEPAM 5 MG/1
5 TABLET ORAL
Status: COMPLETED | OUTPATIENT
Start: 2017-03-06 | End: 2017-03-06

## 2017-03-06 RX ADMIN — OXYCODONE AND ACETAMINOPHEN 2 TABLET: 5; 325 TABLET ORAL at 11:03

## 2017-03-06 RX ADMIN — DIAZEPAM 5 MG: 5 TABLET ORAL at 11:03

## 2017-03-06 NOTE — ED AVS SNAPSHOT
[facility] EMERGENCY DEPARTMENT  [address] [facility] [address]               Regino Lawrence   3/6/2017 10:44 AM   ED    Description:  Female : 1945   Department:  [facility] Emergency Department           Your Care was Coordinated By:     Provider Role From To    Mary Lou Corey MD Attending Provider 17 1046 --      Reason for Visit     Back Pain           Diagnoses this Visit        Comments    Left-sided low back pain without sciatica, unspecified chronicity    -  Primary     Left flank pain           ED Disposition     None           To Do List           Follow-up Information     Follow up with Micaela Mendoza MD. Schedule an appointment as soon as possible for a visit today.    Specialty:  Internal Medicine    Contact information:    [address] [facility] [address]  [phone]          Follow up with [facility] Emergency Department.    Specialty:  Emergency Medicine    Why:  If symptoms worsen    Contact information:    [address] [facility] [address]  [phone]       These Medications        Disp Refills Start End    diazePAM (VALIUM) 5 MG tablet 15 tablet 0 3/6/2017 2017    Take 1 tablet (5 mg total) by mouth every 6 (six) hours as needed for Anxiety. - Oral    Pharmacy: [facility] - [place] [id number] [facility] Ph #: [phone]       oxycodone-acetaminophen (PERCOCET) 5-325 mg per tablet 10 tablet 0 3/6/2017     Take 1 tablet by mouth every 4 (four) hours as needed for Pain. - Oral    Pharmacy: [facility] - [facility] [address] [facility] Ph #: [phone]         [facility] On Call     [facility] On Call Nurse Care Line -  Assistance  Registered nurses in the [facility] provide clinical advisement, health education, appointment booking, and other advisory services.  Call for this free service at [phone].             Medications           Message regarding Medications      Verify the changes and/or additions to your medication regime listed below are the same as discussed with your clinician today.  If any of these changes or additions are incorrect, please notify your healthcare provider.        START taking these NEW medications        Refills    diazePAM (VALIUM) 5 MG tablet 0    Sig: Take 1 tablet (5 mg total) by mouth every 6 (six) hours as needed for Anxiety.    Class: Print    Route: Oral    oxycodone-acetaminophen (PERCOCET) 5-325 mg per tablet 0    Sig: Take 1 tablet by mouth every 4 (four) hours as needed for Pain.    Class: Print    Route: Oral      These medications were administered today        Dose Freq    oxycodone-acetaminophen 5-325 mg per tablet 2 tablet 2 tablet ED 1 Time    Sig: Take 2 tablets by mouth ED 1 Time.    Class: Normal    Route: Oral    diazePAM tablet 5 mg 5 mg ED 1 Time    Sig: Take 1 tablet (5 mg total) by mouth ED 1 Time.    Class: Normal    Route: Oral      STOP taking these medications     tramadol (ULTRAM) 50 mg tablet Take 1 tablet (50 mg total) by mouth every 8 (eight) hours as needed.           Verify that the below list of medications is an accurate representation of the medications you are currently taking.  If none reported, the list may be blank. If incorrect, please contact your healthcare provider. Carry this list with you in case of emergency.           Current Medications     ACCU-CHEK FASTCLIX Kit USE AS DIRECTED.    ALCOHOL ANTISEPTIC PADS (ALCOHOL PREP PADS TOP)     blood sugar diagnostic Strp 1 each by Misc.(Non-Drug; Combo Route) route once daily.    blood-glucose meter kit Use as instructed    calcium carbonate (OS-MALCOLM) 500 mg calcium (1,250 mg) tablet Take 1 tablet (500 mg total) by mouth 2 (two) times daily.    cloNIDine (CATAPRES) 0.3 MG tablet Take 1 tablet (0.3 mg total) by mouth 3 (three) times daily.    conjugated estrogens (PREMARIN) vaginal cream Place 0.5 g vaginally 3 (three) times a week. Apply by fingertip  application    cyclobenzaprine (FLEXERIL) 10 MG tablet TAKE 1 TABLET THREE TIMES DAILY    cyclobenzaprine (FLEXERIL) 10 MG tablet Take 1 tablet (10 mg total) by mouth 3 (three) times daily.    diazePAM (VALIUM) 5 MG tablet Take 1 tablet (5 mg total) by mouth every 6 (six) hours as needed for Anxiety.    diazePAM tablet 5 mg Take 1 tablet (5 mg total) by mouth ED 1 Time.    fenofibrate 160 MG Tab TAKE 1 TABLET ONE TIME DAILY    fexofenadine-pseudoephedrine (ALLEGRA-D 24) 180-240 mg per 24 hr tablet     fexofenadine-pseudoephedrine (ALLEGRA-D 24) 180-240 mg per 24 hr tablet     gabapentin (NEURONTIN) 400 MG capsule Take 1 capsule (400 mg total) by mouth every evening.    lancing device (ACCU-CHEK SOFTCLIX LANCET DEV) Misc Accu-chek FastClix kit    levothyroxine (SYNTHROID) 50 MCG tablet TAKE 1 TABLET (50 MCG TOTAL) BY MOUTH BEFORE BREAKFAST.    lisinopril (PRINIVIL,ZESTRIL) 20 MG tablet TAKE 1 TABLET ONE TIME DAILY    metformin (GLUCOPHAGE) 1000 MG tablet Take 1 tablet (1,000 mg total) by mouth 2 (two) times daily with meals.    METHYL SALICYLATE/MENTH/CAMPH (MUSCLE RUB, WITH CAMPHOR, TOP)     nifedipine (NIFEDICAL XL) 60 MG (OSM) 24 hr tablet Take 1 tablet (60 mg total) by mouth once daily.    ondansetron (ZOFRAN) 8 MG tablet Take 1 tablet (8 mg total) by mouth every 12 (twelve) hours as needed for Nausea.    oxycodone-acetaminophen (PERCOCET) 5-325 mg per tablet Take 1 tablet by mouth every 4 (four) hours as needed for Pain.    potassium chloride SA (K-DUR,KLOR-CON) 20 MEQ tablet Take 1 tablet (20 mEq total) by mouth 2 (two) times daily.    predniSONE (DELTASONE) 10 MG tablet Take 1 tablet (10 mg total) by mouth once daily.    senna-docusate 8.6-50 mg (PERICOLACE) 8.6-50 mg per tablet Take 1 tablet by mouth 2 (two) times daily as needed for Constipation.           Clinical Reference Information           Your Vitals Were     BP Pulse Temp Resp Height Weight    170/95 112 97.9 °F (36.6 °C) (Oral) 18 5' (1.524 m) 58.1  kg (128 lb)    Last Period SpO2 BMI          (LMP Unknown) 100% 25 kg/m2        Allergies as of 3/6/2017        Reactions    Azathioprine Shortness Of Breath, Other (See Comments)    Fatigue      Immunizations Administered on Date of Encounter - 3/6/2017     None      ED Micro, Lab, POCT     Start Ordered       Status Ordering Provider    03/06/17 1110 03/06/17 1110  POCT CMP  Once      Final result     03/06/17 1110 03/06/17 1110  POCT URINALYSIS W/O SCOPE  Once      Final result     03/06/17 1058 03/06/17 1057  POCT URINALYSIS W/O SCOPE  Once      Completed     03/06/17 1058 03/06/17 1057  POCT CMP  Once      Completed     03/06/17 1058 03/06/17 1057  POCT CBC  Once      Acknowledged       ED Imaging Orders     Start Ordered       Status Ordering Provider    03/06/17 1058 03/06/17 1057  CT Renal Stone Study ABD Pelvis WO  1 time imaging      Final result         Discharge Instructions           Back Pain (Acute or Chronic)    Back pain is one of the most common problems. The good news is that most people feel better in 1 to 2 weeks, and most of the rest in 1 to 2 months. Most people can remain active.  People experience and describe pain differently; not everyone is the same.  · The pain can be sharp, stabbing, shooting, aching, cramping or burning.  · Movement, standing, bending, lifting, sitting, or walking may worsen pain.  · It can be localized to one spot or area, or it can be more generalized.  · It can spread or radiate upwards, to the front, or go down your arms or legs (sciatica).  · It can cause muscle spasm.  Most of the time, mechanical problems with the muscles or spine cause the pain. Mechanical problems are usually caused by an injury to the muscles or ligaments. While illness can cause back pain, it is usually not caused by a serious illness. Mechanical problems include:   · Physical activity such as sports, exercise, work, or normal activity  · Overexertion, lifting, pushing, pulling incorrectly  or too aggressively  · Sudden twisting, bending, or stretching from an accident, or accidental movement  · Poor posture  · Stretching or moving wrong, without noticing pain at the time  · Poor coordination, lack of regular exercise (check with your doctor about this)  · Spinal disc disease or arthritis  · Stress  Pain can also be related to pregnancy, or illness like appendicitis, bladder or kidney infections, pelvic infections, and many other things.  Acute back pain usually gets better in 1 to 2 weeks. Back pain related to disk disease, arthritis in the spinal joints or spinal stenosis (narrowing of the spinal canal) can become chronic and last for months or years.  Unless you had a physical injury (for example, a car accident or fall) X-rays are usually not needed for the initial evaluation of back pain. If pain continues and does not respond to medical treatment, X-rays and other tests may be needed.  Home care  Try these home care recommendations:  · When in bed, try to find a position of comfort. A firm mattress is best. Try lying flat on your back with pillows under your knees. You can also try lying on your side with your knees bent up towards your chest and a pillow between your knees.  · At first, do not try to stretch out the sore spots. If there is a strain, it is not like the good soreness you get after exercising without an injury. In this case, stretching may make it worse.  · Avoid prolong sitting, long car rides, or travel. This puts more stress on the lower back than standing or walking.  · During the first 24 to 72 hours after an acute injury or flare up of chronic back pain, apply an ice pack to the painful area for 20 minutes and then remove it for 20 minutes. Do this over a period of 60 to 90 minutes or several times a day. This will reduce swelling and pain. Wrap the ice pack in a thin towel or plastic to protect your skin.  · You can start with ice, then switch to heat. Heat (hot shower, hot  bath, or heating pad) reduces pain and works well for muscle spasms. Heat can be applied to the painful area for 20 minutes then remove it for 20 minutes. Do this over a period of 60 to 90 minutes or several times a day. Do not sleep on a heating pad. It can lead to skin burns or tissue damage.  · You can alternate ice and heat therapy. Talk with your doctor about the best treatment for your back pain.  · Therapeutic massage can help relax the back muscles without stretching them.  · Be aware of safe lifting methods and do not lift anything without stretching first.  Medicines  Talk to your doctor before using medicine, especially if you have other medical problems or are taking other medicines.  · You may use over-the-counter medicine as directed on the bottle to control pain, unless another pain medicine was prescribed. If you have chronic conditions like diabetes, liver or kidney disease, stomach ulcers, or gastrointestinal bleeding, or are taking blood thinners, talk to your doctor before taking any medicine.  · Be careful if you are given a prescription medicines, narcotics, or medicine for muscle spasms. They can cause drowsiness, affect your coordination, reflexes, and judgement. Do not drive or operate heavy machinery.  Follow-up care  Follow up with your healthcare provider, or as advised.   A radiologist will review any X-rays that were taken. Your provide will notify you of any new findings that may affect your care.  Call 911  Call emergency services if any of the following occur:  · Trouble breathing  · Confusion  · Very drowsy or trouble awakening  · Fainting or loss of consciousness  · Rapid or very slow heart rate  · Loss of bowel or bladder control  When to seek medical advice  Call your healthcare provider right away if any of these occur:   · Pain becomes worse or spreads to your legs  · Weakness or numbness in one or both legs  · Numbness in the groin or genital area  Date Last Reviewed:  7/1/2016  © 4714-6235 [facility]. [address] [place] [address], [place] [address]. All rights reserved. This information is not intended as a substitute for professional medical care. Always follow your healthcare professional's instructions.      Stop tramadol while taking Percocet.  No heavy lifting.  Use heating pad to your  back    Your Scheduled Appointments     Mar 09, 2017 10:30 AM CST   Established Patient Visit with Edith Pacheco MD   [facility] ([facility])    [address] [facility] [place] [address] [phone]            Mar 10, 2017 10:00 AM CST   Infusion 15 Min with CHAIR [facility])    [address] [place] [address] [phone]            Mar 23, 2017  9:00 AM CDT   Established Patient Visit with Elio Azevedo MD   [facility] ([place])    [address]  [place] [address] [phone]            Mar 29, 2017  9:30 AM CDT   Diagnostic Xray with [facility] [id number] 485 LB LIMIT   [facility] ([facility] )    [address] [facility] [place] [address] [phone]            Mar 29, 2017 11:30 AM CDT   Established Patient Visit with Fab Conner MD   [facility] - Neurosurgery [facility] ([facility] )    [address] [facility] [place] [address] [phone]               [facility] Emergency Department complies with applicable Federal civil rights laws and does not discriminate on the basis of race, color, national origin, age, disability, or sex.        Language Assistance Services     ATTENTION: Language assistance services are available, free of charge. Please call [phone].      ATENCIÓN: Si habla maria victoria, tiene a rivas disposición servicios gratuitos de asistencia lingüística. Llame al [phone].     CHÚ Ý: N?u b?n nói Ti?ng Vi?t, có các d?ch v? h? tr? ngôn ng? mi?n phí dành cho b?n. G?i s? [phone].

## 2017-03-06 NOTE — DISCHARGE INSTRUCTIONS
Back Pain (Acute or Chronic)    Back pain is one of the most common problems. The good news is that most people feel better in 1 to 2 weeks, and most of the rest in 1 to 2 months. Most people can remain active.  People experience and describe pain differently; not everyone is the same.  · The pain can be sharp, stabbing, shooting, aching, cramping or burning.  · Movement, standing, bending, lifting, sitting, or walking may worsen pain.  · It can be localized to one spot or area, or it can be more generalized.  · It can spread or radiate upwards, to the front, or go down your arms or legs (sciatica).  · It can cause muscle spasm.  Most of the time, mechanical problems with the muscles or spine cause the pain. Mechanical problems are usually caused by an injury to the muscles or ligaments. While illness can cause back pain, it is usually not caused by a serious illness. Mechanical problems include:   · Physical activity such as sports, exercise, work, or normal activity  · Overexertion, lifting, pushing, pulling incorrectly or too aggressively  · Sudden twisting, bending, or stretching from an accident, or accidental movement  · Poor posture  · Stretching or moving wrong, without noticing pain at the time  · Poor coordination, lack of regular exercise (check with your doctor about this)  · Spinal disc disease or arthritis  · Stress  Pain can also be related to pregnancy, or illness like appendicitis, bladder or kidney infections, pelvic infections, and many other things.  Acute back pain usually gets better in 1 to 2 weeks. Back pain related to disk disease, arthritis in the spinal joints or spinal stenosis (narrowing of the spinal canal) can become chronic and last for months or years.  Unless you had a physical injury (for example, a car accident or fall) X-rays are usually not needed for the initial evaluation of back pain. If pain continues and does not respond to medical treatment, X-rays and other tests may  be needed.  Home care  Try these home care recommendations:  · When in bed, try to find a position of comfort. A firm mattress is best. Try lying flat on your back with pillows under your knees. You can also try lying on your side with your knees bent up towards your chest and a pillow between your knees.  · At first, do not try to stretch out the sore spots. If there is a strain, it is not like the good soreness you get after exercising without an injury. In this case, stretching may make it worse.  · Avoid prolong sitting, long car rides, or travel. This puts more stress on the lower back than standing or walking.  · During the first 24 to 72 hours after an acute injury or flare up of chronic back pain, apply an ice pack to the painful area for 20 minutes and then remove it for 20 minutes. Do this over a period of 60 to 90 minutes or several times a day. This will reduce swelling and pain. Wrap the ice pack in a thin towel or plastic to protect your skin.  · You can start with ice, then switch to heat. Heat (hot shower, hot bath, or heating pad) reduces pain and works well for muscle spasms. Heat can be applied to the painful area for 20 minutes then remove it for 20 minutes. Do this over a period of 60 to 90 minutes or several times a day. Do not sleep on a heating pad. It can lead to skin burns or tissue damage.  · You can alternate ice and heat therapy. Talk with your doctor about the best treatment for your back pain.  · Therapeutic massage can help relax the back muscles without stretching them.  · Be aware of safe lifting methods and do not lift anything without stretching first.  Medicines  Talk to your doctor before using medicine, especially if you have other medical problems or are taking other medicines.  · You may use over-the-counter medicine as directed on the bottle to control pain, unless another pain medicine was prescribed. If you have chronic conditions like diabetes, liver or kidney disease,  stomach ulcers, or gastrointestinal bleeding, or are taking blood thinners, talk to your doctor before taking any medicine.  · Be careful if you are given a prescription medicines, narcotics, or medicine for muscle spasms. They can cause drowsiness, affect your coordination, reflexes, and judgement. Do not drive or operate heavy machinery.  Follow-up care  Follow up with your healthcare provider, or as advised.   A radiologist will review any X-rays that were taken. Your provide will notify you of any new findings that may affect your care.  Call 911  Call emergency services if any of the following occur:  · Trouble breathing  · Confusion  · Very drowsy or trouble awakening  · Fainting or loss of consciousness  · Rapid or very slow heart rate  · Loss of bowel or bladder control  When to seek medical advice  Call your healthcare provider right away if any of these occur:   · Pain becomes worse or spreads to your legs  · Weakness or numbness in one or both legs  · Numbness in the groin or genital area  Date Last Reviewed: 7/1/2016  © 0310-9436 [employer]. [address] [place] [address]. All rights reserved. This information is not intended as a substitute for professional medical care. Always follow your healthcare professional's instructions.      Stop tramadol while taking Percocet.  No heavy lifting.  Use heating pad to your  back

## 2017-03-08 ENCOUNTER — PATIENT MESSAGE (OUTPATIENT)
Dept: RHEUMATOLOGY | Facility: CLINIC | Age: 72
End: 2017-03-08

## 2017-03-09 ENCOUNTER — TELEPHONE (OUTPATIENT)
Dept: HEMATOLOGY/ONCOLOGY | Facility: CLINIC | Age: 72
End: 2017-03-09

## 2017-03-09 ENCOUNTER — OFFICE VISIT (OUTPATIENT)
Dept: HEMATOLOGY/ONCOLOGY | Facility: CLINIC | Age: 72
End: 2017-03-09
Payer: MEDICARE

## 2017-03-09 VITALS
TEMPERATURE: 98 F | BODY MASS INDEX: 25.5 KG/M2 | HEART RATE: 86 BPM | SYSTOLIC BLOOD PRESSURE: 114 MMHG | OXYGEN SATURATION: 96 % | WEIGHT: 129.88 LBS | HEIGHT: 60 IN | DIASTOLIC BLOOD PRESSURE: 62 MMHG

## 2017-03-09 DIAGNOSIS — N18.9 ANEMIA IN CKD (CHRONIC KIDNEY DISEASE): Primary | ICD-10-CM

## 2017-03-09 DIAGNOSIS — D63.1 ANEMIA IN CKD (CHRONIC KIDNEY DISEASE): Primary | ICD-10-CM

## 2017-03-09 DIAGNOSIS — D50.9 IRON DEFICIENCY ANEMIA, UNSPECIFIED: Primary | ICD-10-CM

## 2017-03-09 PROCEDURE — 99213 OFFICE O/P EST LOW 20 MIN: CPT | Mod: S$GLB,,, | Performed by: INTERNAL MEDICINE

## 2017-03-09 PROCEDURE — 1157F ADVNC CARE PLAN IN RCRD: CPT | Mod: S$GLB,,, | Performed by: INTERNAL MEDICINE

## 2017-03-09 PROCEDURE — 1160F RVW MEDS BY RX/DR IN RCRD: CPT | Mod: S$GLB,,, | Performed by: INTERNAL MEDICINE

## 2017-03-09 PROCEDURE — 99499 UNLISTED E&M SERVICE: CPT | Mod: S$GLB,,, | Performed by: INTERNAL MEDICINE

## 2017-03-09 PROCEDURE — 3074F SYST BP LT 130 MM HG: CPT | Mod: S$GLB,,, | Performed by: INTERNAL MEDICINE

## 2017-03-09 PROCEDURE — 1159F MED LIST DOCD IN RCRD: CPT | Mod: S$GLB,,, | Performed by: INTERNAL MEDICINE

## 2017-03-09 PROCEDURE — 99999 PR PBB SHADOW E&M-EST. PATIENT-LVL IV: CPT | Mod: PBBFAC,,, | Performed by: INTERNAL MEDICINE

## 2017-03-09 PROCEDURE — 3078F DIAST BP <80 MM HG: CPT | Mod: S$GLB,,, | Performed by: INTERNAL MEDICINE

## 2017-03-09 PROCEDURE — 1126F AMNT PAIN NOTED NONE PRSNT: CPT | Mod: S$GLB,,, | Performed by: INTERNAL MEDICINE

## 2017-03-09 NOTE — PROGRESS NOTES
Subjective:       Patient ID: Regino Lawrence is a 71 y.o. female.    Chief Complaint: Follow-up  Diagnosis: Anemia in CKD  Patient is a Episcopalian  .  HPI    The patient is seen today for chronic anemia in CKD. The patient reports that she has been diagnosed with JOLLY in the past.  She has been on oral iron supplementation therapy, but could not tolerate or did not respond, she is uncertain.  She is followed by GI and has undergone a colonoscopy earlier this year and was diagnosed with hemorrhoids for which she underwent banding procedure.  No melena, hematochezia,change in bowel habits.  She has also been diagnosed with B12 deficiency in the past, but reports she did not respond to B12 injections. No history of blood transfusions.  She is a Ep[employer]op[employer].  She reports that she remembers getting injections in the 1970s when her blood count was low.             Today, she reports Left LBP  She reports acute-on -chronic LBP  She recently visited  ED  Secondary to sx's   Workup included CT renal study- unremarkable  No urinary sx's  She continues to undergo weekly Procrit therapy   Procrit therapy held past month( due to lab parameters)   She undergoes intermittent IV iron therapy  No fatigue  No SOB/CP/NV   She continues with arthraglias- stable    CBC reveals Hb 9.9 g/dl    PAST MEDICAL HISTORY:  Acid reflux, alopecia, anemia, anxiety disorder, chronic  kidney disease, depression, diabetes mellitus type 2, hyperlipidemia,  hypertension, hypothyroidism, osteoporosis, sarcoidosis.    PAST SURGICAL HISTORY:  Cholecystectomy, , tubal ligation, carpal tunnel release, cataracts.    FAMILY HISTORY: Unremarkable for cancer. Significant for HTN.       Review of Systems   Constitutional: Negative for activity change, appetite change, chills, diaphoresis and fatigue.   HENT: Negative for hearing loss, nosebleeds and rhinorrhea.    Eyes: Negative for visual disturbance.   Respiratory: Negative for cough,  chest tightness, shortness of breath and wheezing.    Cardiovascular: Negative for chest pain, palpitations and leg swelling.   Gastrointestinal: Negative for abdominal pain, blood in stool, constipation, diarrhea and nausea.   Genitourinary: Negative for difficulty urinating, flank pain and urgency.   Musculoskeletal: Positive for arthralgias and back pain. Negative for gait problem and neck pain.   Skin: Negative for rash.        No petechiae, ecchymoses   Neurological: Negative for tremors, light-headedness and headaches.   Hematological: Negative for adenopathy. Does not bruise/bleed easily.       Objective:       Vitals:    03/09/17 0917   BP: 114/62   BP Location: Right arm   Patient Position: Sitting   BP Method: Manual   Pulse: 86   Temp: 98.1 °F (36.7 °C)   TempSrc: Oral   SpO2: 96%   Weight: 58.9 kg (129 lb 13.6 oz)   Height: 5' (1.524 m)       Physical Exam   Constitutional: She is oriented to person, place, and time. She appears well-developed and well-nourished.   HENT:   Head: Normocephalic.   Mouth/Throat: Oropharynx is clear and moist. No oropharyngeal exudate.   Eyes: Conjunctivae and lids are normal. Pupils are equal, round, and reactive to light. No scleral icterus.   Neck: Normal range of motion. Neck supple. No thyromegaly present.   Cardiovascular: Normal rate, regular rhythm and normal heart sounds.    No murmur heard.  Pulmonary/Chest: Breath sounds normal. She has no wheezes. She has no rales.   Abdominal: Soft. Bowel sounds are normal. She exhibits no distension and no mass. There is no hepatosplenomegaly. There is no tenderness. There is no rebound and no guarding.   Musculoskeletal: Normal range of motion. She exhibits no edema or tenderness.   Lymphadenopathy:     She has no cervical adenopathy.     She has no axillary adenopathy.        Right: No supraclavicular adenopathy present.        Left: No supraclavicular adenopathy present.   Neurological: She is alert and oriented to person,  place, and time. No cranial nerve deficit. Coordination normal.   Skin: Skin is warm and dry. No ecchymosis, no petechiae and no rash noted. No erythema.   Psychiatric: She has a normal mood and affect.         Results for CANDELARIA SANTANA (MRN [id number]) as of 8/28/2016 14:24   Ref. Range 10/14/2015 08:18 12/8/2015 11:33 3/7/2016 12:08   Vitamin B-12 Latest Ref Range: 210 - 950 pg/mL [phone]       Results for CANDELARIA SANTANA (MRN [id number]) as of 9/22/2016 10:20   Ref. Range 8/17/2016 14:30   Retic Latest Ref Range: 0.5 - 2.5 % 2.8 (H)   Sed Rate Latest Ref Range: 0 - 20 mm/Hr 31 (H)     Lab Results   Component Value Date    WBC 7.23 03/06/2017    HGB 9.9 (L) 03/06/2017    HCT 31.1 (L) 03/06/2017    MCV 98 03/06/2017     03/06/2017     Lab Results   Component Value Date    IRON 67 03/06/2017    TIBC 417 03/06/2017    FERRITIN 124 03/06/2017     SPEP-nl    Results for CANDELARIA SANTANA (MRN [id number]) as of 9/22/2016 10:20   Ref. Range 8/17/2016 14:30   Brea Free Light Chains Latest Ref Range: 0.33 - 1.94 mg/dL 5.75 (H)   Lambda Free Light Chains Latest Ref Range: 0.57 - 2.63 mg/dL 4.55 (H)   Kappa/Lambda FLC Ratio Latest Ref Range: 0.26 - 1.65  1.26       Results for CANDELARIA SANTANA (MRN [id number]) as of 1/4/2017 15:14   Ref. Range 12/16/2016 10:14   Sed Rate Latest Ref Range: 0 - 20 mm/Hr 43 (H)     CT renal 3/6/2017   IMPRESSION:  1.  No renal, ureteral or bladder calculi.  No hydronephrosis or ureterectasis.  2.  Poorly distended bladder.  Mild bladder wall prominence.  Mild cystitis cannot be   excluded.  3.  Moderate constipation.  Normal appendix    Assessment:       1. Anemia in CKD (chronic kidney disease)    2. Patient is Episcopal        Plan:   1.,2. Pt clinically stable  Hb 9.9g/dl   Ferritin 124  Pt is a Oriental orthodox and declines/not interested in blood and blood products due to Religion beliefs  Cont  Procrit therapy to 10,000u q 2wks ( pending lab parameters)  CBC q 2wks  F/u 2 mos  with Fe studies        CC: Micaela Mendoza M.D.

## 2017-03-09 NOTE — MR AVS SNAPSHOT
[facility]  [facility] [place] [address]  Phone: [phone]                  Regino Lawrence   3/9/2017 9:00 AM   Office Visit    Description:  Female : 1945   Provider:  Edith Pacheco MD   [facility]           Reason for Visit     Follow-up           Diagnoses this Visit        Comments    Anemia in CKD (chronic kidney disease)    -  Primary     Patient is Mandaeism                To Do List           Future Appointments        Provider Department Dept Phone    3/10/2017 10:00 AM CHAIR 05 [facility] [phone]    3/10/2017 11:00 AM LAB, [facility] [phone]    3/14/2017 10:40 AM Micaela Mendoza MD [facility] Family Medicine [phone]    3/23/2017 9:00 AM Elio Azevedo MD [facility] Ophthalmology [phone]    3/29/2017 9:30 AM [facility] [id number] [address] LB LIMIT [facility] [phone]      Goals (5 Years of Data)              11/29/16    10/17/16    3/7/16    COMPLETED: HDL > 40           Related Problems    Combined 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    HEMOGLOBIN A1C < 7.0   5.6  5.6  5.7    Related Problems    Diabetes mellitus type 2, controlled    COMPLETED: LDL CHOLESTEROL < 70           Related Problems    Combined 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      Follow-Up and Disposition     Return in about 2 months (around 2017).    Follow-up and Disposition History      [facility]     [facility] Nurse Care [facility] -  Assistance  Registered nurses in the [facility] provide clinical advisement, health education, appointment booking, and other advisory services.  Call for this free service at [phone].             Medications           Message regarding Medications     Verify the changes and/or additions to your medication regime listed below are the same as discussed with  your clinician today.  If any of these changes or additions are incorrect, please notify your healthcare provider.        STOP taking these medications     conjugated estrogens (PREMARIN) vaginal cream Place 0.5 g vaginally 3 (three) times a week. Apply by fingertip application    senna-docusate 8.6-50 mg (PERICOLACE) 8.6-50 mg per tablet Take 1 tablet by mouth 2 (two) times daily as needed for Constipation.           Verify that the below list of medications is an accurate representation of the medications you are currently taking.  If none reported, the list may be blank. If incorrect, please contact your healthcare provider. Carry this list with you in case of emergency.           Current Medications     ACCU-CHEK FASTCLIX Kit USE AS DIRECTED.    ALCOHOL ANTISEPTIC PADS (ALCOHOL PREP PADS TOP)     blood sugar diagnostic Strp 1 each by Misc.(Non-Drug; Combo Route) route once daily.    blood-glucose meter kit Use as instructed    calcium carbonate (OS-MALCOLM) 500 mg calcium (1,250 mg) tablet Take 1 tablet (500 mg total) by mouth 2 (two) times daily.    cloNIDine (CATAPRES) 0.3 MG tablet Take 1 tablet (0.3 mg total) by mouth 3 (three) times daily.    cyclobenzaprine (FLEXERIL) 10 MG tablet Take 1 tablet (10 mg total) by mouth 3 (three) times daily.    diazePAM (VALIUM) 5 MG tablet Take 1 tablet (5 mg total) by mouth every 6 (six) hours as needed for Anxiety.    fenofibrate 160 MG Tab TAKE 1 TABLET ONE TIME DAILY    fexofenadine-pseudoephedrine (ALLEGRA-D 24) 180-240 mg per 24 hr tablet     gabapentin (NEURONTIN) 400 MG capsule Take 1 capsule (400 mg total) by mouth every evening.    lancing device (ACCU-CHEK SOFTCLIX LANCET DEV) Misc Accu-chek FastClix kit    levothyroxine (SYNTHROID) 50 MCG tablet TAKE 1 TABLET (50 MCG TOTAL) BY MOUTH BEFORE BREAKFAST.    lisinopril (PRINIVIL,ZESTRIL) 20 MG tablet TAKE 1 TABLET ONE TIME DAILY    metformin (GLUCOPHAGE) 1000 MG tablet Take 1 tablet (1,000 mg total) by mouth 2 (two)  times daily with meals.    METHYL SALICYLATE/MENTH/CAMPH (MUSCLE RUB, WITH CAMPHOR, TOP)     nifedipine (NIFEDICAL XL) 60 MG (OSM) 24 hr tablet Take 1 tablet (60 mg total) by mouth once daily.    ondansetron (ZOFRAN) 8 MG tablet Take 1 tablet (8 mg total) by mouth every 12 (twelve) hours as needed for Nausea.    oxycodone-acetaminophen (PERCOCET) 5-325 mg per tablet Take 1 tablet by mouth every 4 (four) hours as needed for Pain.    potassium chloride SA (K-DUR,KLOR-CON) 20 MEQ tablet Take 1 tablet (20 mEq total) by mouth 2 (two) times daily.    predniSONE (DELTASONE) 10 MG tablet Take 1 tablet (10 mg total) by mouth once daily.           Clinical Reference Information           Your Vitals Were     BP Pulse Temp Height Weight Last Period    114/62 (BP Location: Right arm, Patient Position: Sitting, BP Method: Manual) 86 98.1 °F (36.7 °C) (Oral) 5' (1.524 m) 58.9 kg (129 lb 13.6 oz) (LMP Unknown)    SpO2 BMI             96% 25.36 kg/m2         Blood Pressure          Most Recent Value    BP  114/62      Allergies as of 3/9/2017     Azathioprine      Immunizations Administered on Date of Encounter - 3/9/2017     None      Language Assistance Services     ATTENTION: Language assistance services are available, free of charge. Please call [phone].      ATENCIÓN: Si habla español, tiene a rivas disposición servicios gratuitos de asistencia lingüística. Llame al [phone].     [facility] Ý: N?u b?n nói Ti?ng Vi?t, có các d?ch v? h? tr? ngôn ng? mi?n phí dành cho b?n. G?i s? [phone].         [facility] complies with applicable Federal civil rights laws and does not discriminate on the basis of race, color, national origin, age, disability, or sex.

## 2017-03-10 ENCOUNTER — INFUSION (OUTPATIENT)
Dept: INFUSION THERAPY | Facility: HOSPITAL | Age: 72
End: 2017-03-10
Attending: INTERNAL MEDICINE
Payer: MEDICARE

## 2017-03-10 VITALS — HEART RATE: 106 BPM | RESPIRATION RATE: 18 BRPM | SYSTOLIC BLOOD PRESSURE: 108 MMHG | DIASTOLIC BLOOD PRESSURE: 64 MMHG

## 2017-03-10 DIAGNOSIS — N18.9 ANEMIA IN CKD (CHRONIC KIDNEY DISEASE): Primary | ICD-10-CM

## 2017-03-10 DIAGNOSIS — Z53.1 REFUSAL OF BLOOD TRANSFUSIONS AS PATIENT IS JEHOVAH'S WITNESS: ICD-10-CM

## 2017-03-10 DIAGNOSIS — D63.1 ANEMIA IN CKD (CHRONIC KIDNEY DISEASE): Primary | ICD-10-CM

## 2017-03-10 PROCEDURE — 96372 THER/PROPH/DIAG INJ SC/IM: CPT

## 2017-03-10 PROCEDURE — 63600175 PHARM REV CODE 636 W HCPCS: Mod: EC | Performed by: INTERNAL MEDICINE

## 2017-03-10 RX ADMIN — ERYTHROPOIETIN 10000 UNITS: 10000 INJECTION, SOLUTION INTRAVENOUS; SUBCUTANEOUS at 09:03

## 2017-03-13 ENCOUNTER — PATIENT MESSAGE (OUTPATIENT)
Dept: RHEUMATOLOGY | Facility: CLINIC | Age: 72
End: 2017-03-13

## 2017-03-14 ENCOUNTER — OFFICE VISIT (OUTPATIENT)
Dept: FAMILY MEDICINE | Facility: CLINIC | Age: 72
End: 2017-03-14
Payer: MEDICARE

## 2017-03-14 ENCOUNTER — PATIENT MESSAGE (OUTPATIENT)
Dept: RHEUMATOLOGY | Facility: CLINIC | Age: 72
End: 2017-03-14

## 2017-03-14 VITALS
SYSTOLIC BLOOD PRESSURE: 164 MMHG | OXYGEN SATURATION: 97 % | HEART RATE: 116 BPM | TEMPERATURE: 98 F | DIASTOLIC BLOOD PRESSURE: 76 MMHG | WEIGHT: 133.69 LBS | HEIGHT: 60 IN | BODY MASS INDEX: 26.25 KG/M2 | RESPIRATION RATE: 20 BRPM

## 2017-03-14 DIAGNOSIS — E11.22 DIABETES MELLITUS WITH STAGE 3 CHRONIC KIDNEY DISEASE: Chronic | ICD-10-CM

## 2017-03-14 DIAGNOSIS — S39.012A LOW BACK STRAIN, INITIAL ENCOUNTER: Primary | ICD-10-CM

## 2017-03-14 DIAGNOSIS — M62.838 MUSCLE SPASM: ICD-10-CM

## 2017-03-14 DIAGNOSIS — E11.9 CONTROLLED TYPE 2 DIABETES MELLITUS WITHOUT COMPLICATION, WITHOUT LONG-TERM CURRENT USE OF INSULIN: Chronic | ICD-10-CM

## 2017-03-14 DIAGNOSIS — D84.9 IMMUNOSUPPRESSION: Chronic | ICD-10-CM

## 2017-03-14 DIAGNOSIS — N18.30 DIABETES MELLITUS WITH STAGE 3 CHRONIC KIDNEY DISEASE: Chronic | ICD-10-CM

## 2017-03-14 PROCEDURE — 1159F MED LIST DOCD IN RCRD: CPT | Mod: S$GLB,,, | Performed by: FAMILY MEDICINE

## 2017-03-14 PROCEDURE — 3044F HG A1C LEVEL LT 7.0%: CPT | Mod: S$GLB,,, | Performed by: FAMILY MEDICINE

## 2017-03-14 PROCEDURE — 99999 PR PBB SHADOW E&M-EST. PATIENT-LVL V: CPT | Mod: PBBFAC,,, | Performed by: FAMILY MEDICINE

## 2017-03-14 PROCEDURE — 3077F SYST BP >= 140 MM HG: CPT | Mod: S$GLB,,, | Performed by: FAMILY MEDICINE

## 2017-03-14 PROCEDURE — 1126F AMNT PAIN NOTED NONE PRSNT: CPT | Mod: S$GLB,,, | Performed by: FAMILY MEDICINE

## 2017-03-14 PROCEDURE — 3078F DIAST BP <80 MM HG: CPT | Mod: S$GLB,,, | Performed by: FAMILY MEDICINE

## 2017-03-14 PROCEDURE — 1160F RVW MEDS BY RX/DR IN RCRD: CPT | Mod: S$GLB,,, | Performed by: FAMILY MEDICINE

## 2017-03-14 PROCEDURE — 99214 OFFICE O/P EST MOD 30 MIN: CPT | Mod: S$GLB,,, | Performed by: FAMILY MEDICINE

## 2017-03-14 PROCEDURE — 99499 UNLISTED E&M SERVICE: CPT | Mod: S$GLB,,, | Performed by: FAMILY MEDICINE

## 2017-03-14 PROCEDURE — 1157F ADVNC CARE PLAN IN RCRD: CPT | Mod: S$GLB,,, | Performed by: FAMILY MEDICINE

## 2017-03-14 PROCEDURE — 4010F ACE/ARB THERAPY RXD/TAKEN: CPT | Mod: S$GLB,,, | Performed by: FAMILY MEDICINE

## 2017-03-14 RX ORDER — GLYCERIN AND PROPYLENE GLYCOL .3; 1 G/100ML; ML/100ML
SOLUTION/ DROPS OPHTHALMIC
Status: ON HOLD | COMMUNITY
Start: 2017-02-17 | End: 2018-10-08 | Stop reason: HOSPADM

## 2017-03-14 RX ORDER — CETIRIZINE HYDROCHLORIDE 10 MG/1
TABLET ORAL
COMMUNITY
Start: 2017-02-17 | End: 2018-03-22 | Stop reason: ALTCHOICE

## 2017-03-14 NOTE — MR AVS SNAPSHOT
[facility]  [address] [facility]  [place] [address]  Phone: [phone]  Fax: [phone]                  Regino Lawrence   3/14/2017 10:40 AM   Office Visit    Description:  Female : 1945   Provider:  Micaela Mendoza MD   Department:  [facility]           Reason for Visit     Hospital Follow Up     Back Pain     Flank Pain           Diagnoses this Visit        Comments    Low back strain, initial encounter    -  Primary     Muscle spasm         Diabetes mellitus with stage 3 chronic kidney disease         Immunosuppression         Controlled type 2 diabetes mellitus without complication, without long-term current use of insulin                To Do List           Future Appointments        Provider Department Dept Phone    3/23/2017 9:00 AM Elio Azevedo MD [facility] Ophthalmology [phone]    3/24/2017 9:50 AM [facility] [phone]    3/24/2017 10:00 AM CHAIR 05 [facility] [phone]    3/29/2017 9:30 AM [facility] [id number] 485 LB LIMIT [facility] [phone]    3/29/2017 11:30 AM Fab Conner MD [facility] - Neurosurgery [facility] [phone]      Goals (5 Years of Data)              11/29/16    10/17/16    3/7/16    COMPLETED: HDL > 40           Related Problems    Combined 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    HEMOGLOBIN A1C < 7.0   5.6  5.6  5.7    Related Problems    Diabetes mellitus type 2, controlled    COMPLETED: LDL CHOLESTEROL < 70           Related Problems    Combined 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      Follow-Up and Disposition     Return in about 1 month (around 2017) for Follow up.      [facility]     [facility] Care [facility] -  Assistance  Registered nurses in the [facility] provide clinical advisement, health education, appointment booking, and other advisory services.  Call for this free  service at [phone].             Medications           Message regarding Medications     Verify the changes and/or additions to your medication regime listed below are the same as discussed with your clinician today.  If any of these changes or additions are incorrect, please notify your healthcare provider.        STOP taking these medications     fexofenadine-pseudoephedrine (ALLEGRA-D 24) 180-240 mg per 24 hr tablet            Verify that the below list of medications is an accurate representation of the medications you are currently taking.  If none reported, the list may be blank. If incorrect, please contact your healthcare provider. Carry this list with you in case of emergency.           Current Medications     ACCU-CHEK FASTCLIX Kit USE AS DIRECTED.    ALCOHOL ANTISEPTIC PADS (ALCOHOL PREP PADS TOP)     blood sugar diagnostic Strp 1 each by Misc.(Non-Drug; Combo Route) route once daily.    blood-glucose meter kit Use as instructed    calcium carbonate (OS-MALCOLM) 500 mg calcium (1,250 mg) tablet Take 1 tablet (500 mg total) by mouth 2 (two) times daily.    cetirizine (ZYRTEC) 10 MG tablet     cloNIDine (CATAPRES) 0.3 MG tablet Take 1 tablet (0.3 mg total) by mouth 3 (three) times daily.    cyclobenzaprine (FLEXERIL) 10 MG tablet Take 1 tablet (10 mg total) by mouth 3 (three) times daily.    diazePAM (VALIUM) 5 MG tablet Take 1 tablet (5 mg total) by mouth every 6 (six) hours as needed for Anxiety.    fenofibrate 160 MG Tab TAKE 1 TABLET ONE TIME DAILY    gabapentin (NEURONTIN) 400 MG capsule Take 1 capsule (400 mg total) by mouth every evening.    lancing device (ACCU-CHEK SOFTCLIX LANCET DEV) Misc Accu-chek FastClix kit    levothyroxine (SYNTHROID) 50 MCG tablet TAKE 1 TABLET (50 MCG TOTAL) BY MOUTH BEFORE BREAKFAST.    lisinopril (PRINIVIL,ZESTRIL) 20 MG tablet TAKE 1 TABLET ONE TIME DAILY    LUBRICANT EYE DROPS, GLYC-PG, 1-0.3 % Drop     metformin (GLUCOPHAGE) 1000 MG tablet Take 1 tablet (1,000 mg  total) by mouth 2 (two) times daily with meals.    METHYL SALICYLATE/MENTH/CAMPH (MUSCLE RUB, WITH CAMPHOR, TOP)     nifedipine (NIFEDICAL XL) 60 MG (OSM) 24 hr tablet Take 1 tablet (60 mg total) by mouth once daily.    oxycodone-acetaminophen (PERCOCET) 5-325 mg per tablet Take 1 tablet by mouth every 4 (four) hours as needed for Pain.    potassium chloride SA (K-DUR,KLOR-CON) 20 MEQ tablet Take 1 tablet (20 mEq total) by mouth 2 (two) times daily.    predniSONE (DELTASONE) 10 MG tablet Take 1 tablet (10 mg total) by mouth once daily.    ondansetron (ZOFRAN) 8 MG tablet Take 1 tablet (8 mg total) by mouth every 12 (twelve) hours as needed for Nausea.           Clinical Reference Information           Your Vitals Were     BP Pulse Temp Resp Height Weight    166/74 (BP Location: Left arm, Patient Position: Sitting, BP Method: Manual) 118 97.9 °F (36.6 °C) (Oral) 20 5' (1.524 m) 60.6 kg (133 lb 11.3 oz)    Last Period SpO2 BMI          (LMP Unknown) 97% 26.11 kg/m2        Blood Pressure          Most Recent Value    BP  (!)  166/74      Allergies as of 3/14/2017     Azathioprine      Immunizations Administered on Date of Encounter - 3/14/2017     None      Orders Placed During Today's Visit      Normal Orders This Visit    Ambulatory consult to Physical Therapy       Language Assistance Services     ATTENTION: Language assistance services are available, free of charge. Please call [phone].      ATENCIÓN: Si habla maria victoria, tiene a rivas disposición servicios gratuitos de asistencia lingüística. Llame al [phone].     [facility] Ý: N?u b?n nói Ti?ng Vi?t, có các d?ch v? h? tr? ngôn ng? mi?n phí dành cho b?n. G?i s? [phone].         [facility] complies with applicable Federal civil rights laws and does not discriminate on the basis of race, color, national origin, age, disability, or sex.

## 2017-03-15 NOTE — PROGRESS NOTES
Chief Complaint   Patient presents with    Hospital Follow Up    Back Pain    Flank Pain       HPI  Regino Lawrence is a 71 y.o. female with multiple medical diagnoses as listed in the medical history and problem list that presents for evaluation for hospital follow up for back and flank pain.    She has been having one week of low back pain along her left lower back. She woke up with this pain. She is unsure of any activity that could have injured it, but she did do some sweeping the day before the pain started. It is described as sharp and radiates to her lower abdomen. 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 She is not having pain in her bladder or urinary frequency. She is not having numbness or tingling. She was seen in the ER and had a CT scan that showed possible inflammation of her bladder but was otherwise normal. She has an ovarian cyst that is being followed by UroGYN. She contacted Dr. Houser, her rheumatologist and had some blood tests drawn but these were also improved.     She has not had any elevated blood sugars and is still seeing Dr. Pacheco for her anemia. She is taking procrit and getting iron infusions. She does not accept blood products.    PAST MEDICAL HISTORY:  Past Medical History:   Diagnosis Date    Acid reflux     Allergy     Alopecia     Anemia     Anxiety     Arthritis     Cataract     Chronic kidney disease     Depression     Diabetes mellitus, type 2     Eye injury as a child     k-abrasion  od    Hyperlipidemia     Hypertension     Hypothyroidism     Myalgia and myositis 2012    Osteoporosis     Sarcoidosis     Ulcer     no cancer       PAST SURGICAL HISTORY:  Past Surgical History:   Procedure Laterality Date    CARPAL TUNNEL RELEASE      Rt wrist    CATARACT EXTRACTION W/  INTRAOCULAR LENS IMPLANT Right 2015    Dr. Azevedo    CATARACT EXTRACTION W/  INTRAOCULAR LENS IMPLANT Left 2015    Dr. Azevedo     SECTION       CHOLECYSTECTOMY      TUBAL LIGATION         SOCIAL HISTORY:  Social History     Social History    Marital status:      Spouse name: N/A    Number of children: N/A    Years of education: N/A     Occupational History    Not on file.     Social History Main Topics    Smoking status: Never Smoker    Smokeless tobacco: Never Used    Alcohol use No    Drug use: No    Sexual activity: Yes     Partners: Male     Other Topics Concern    Not on file     Social History Narrative       FAMILY HISTORY:  Family History   Problem Relation Age of Onset    Hypertension Mother     Cataracts Mother     No Known Problems Father     Hypertension Maternal Grandmother     Glaucoma Sister     No Known Problems Brother     No Known Problems Maternal Aunt     No Known Problems Maternal Uncle     No Known Problems Paternal Aunt     No Known Problems Paternal Uncle     No Known Problems Maternal Grandfather     No Known Problems Paternal Grandmother     No Known Problems Paternal Grandfather     Lupus Neg Hx     Rheum arthritis Neg Hx     Amblyopia Neg Hx     Blindness Neg Hx     Cancer Neg Hx     Diabetes Neg Hx     Macular degeneration Neg Hx     Retinal detachment Neg Hx     Strabismus Neg Hx     Stroke Neg Hx     Thyroid disease Neg Hx     Endometrial cancer Neg Hx     Vaginal cancer Neg Hx     Cervical cancer Neg Hx        ALLERGIES AND MEDICATIONS: updated and reviewed.  Review of patient's allergies indicates:   Allergen Reactions    Azathioprine Shortness Of Breath and Other (See Comments)     Fatigue     Current Outpatient Prescriptions   Medication Sig Dispense Refill    ACCU-CHEK FASTCLIX Kit USE AS DIRECTED. 1 each 0    ALCOHOL ANTISEPTIC PADS (ALCOHOL PREP PADS TOP)       blood sugar diagnostic Strp 1 each by Misc.(Non-Drug; Combo Route) route once daily. 100 strip 11    blood-glucose meter kit Use as instructed 1 each 0    calcium carbonate (OS-MALCOLM) 500 mg calcium (1,250 mg)  tablet Take 1 tablet (500 mg total) by mouth 2 (two) times daily.  0    cetirizine (ZYRTEC) 10 MG tablet       cloNIDine (CATAPRES) 0.3 MG tablet Take 1 tablet (0.3 mg total) by mouth 3 (three) times daily. 270 tablet 1    cyclobenzaprine (FLEXERIL) 10 MG tablet Take 1 tablet (10 mg total) by mouth 3 (three) times daily. 180 tablet 1    diazePAM (VALIUM) 5 MG tablet Take 1 tablet (5 mg total) by mouth every 6 (six) hours as needed for Anxiety. 15 tablet 0    fenofibrate 160 MG Tab TAKE 1 TABLET ONE TIME DAILY 90 tablet 1    gabapentin (NEURONTIN) 400 MG capsule Take 1 capsule (400 mg total) by mouth every evening. 90 capsule 1    lancing device (ACCU-CHEK SOFTCLIX LANCET DEV) Misc Accu-chek FastClix kit 1 each 0    levothyroxine (SYNTHROID) 50 MCG tablet TAKE 1 TABLET (50 MCG TOTAL) BY MOUTH BEFORE BREAKFAST. 90 tablet 1    lisinopril (PRINIVIL,ZESTRIL) 20 MG tablet TAKE 1 TABLET ONE TIME DAILY 90 tablet 1    LUBRICANT EYE DROPS, GLYC-PG, 1-0.3 % Drop       metformin (GLUCOPHAGE) 1000 MG tablet Take 1 tablet (1,000 mg total) by mouth 2 (two) times daily with meals. 180 tablet 1    METHYL SALICYLATE/MENTH/CAMPH (MUSCLE RUB, WITH CAMPHOR, TOP)       nifedipine (NIFEDICAL XL) 60 MG (OSM) 24 hr tablet Take 1 tablet (60 mg total) by mouth once daily. 90 tablet 1    oxycodone-acetaminophen (PERCOCET) 5-325 mg per tablet Take 1 tablet by mouth every 4 (four) hours as needed for Pain. 10 tablet 0    potassium chloride SA (K-DUR,KLOR-CON) 20 MEQ tablet Take 1 tablet (20 mEq total) by mouth 2 (two) times daily. (Patient taking differently: Take 20 mEq by mouth once daily. ) 180 tablet 1    predniSONE (DELTASONE) 10 MG tablet Take 1 tablet (10 mg total) by mouth once daily. 90 tablet 1    ondansetron (ZOFRAN) 8 MG tablet Take 1 tablet (8 mg total) by mouth every 12 (twelve) hours as needed for Nausea. 25 tablet 1     No current facility-administered medications for this visit.        ROS  Review of Systems    Constitutional: Negative for chills, diaphoresis, fatigue, fever and unexpected weight change.   HENT: Negative for rhinorrhea, sinus pressure, sore throat and tinnitus.    Eyes: Negative for photophobia and visual disturbance.   Respiratory: Negative for cough, shortness of breath and wheezing.    Cardiovascular: Negative for chest pain and palpitations.   Gastrointestinal: Negative for abdominal pain, blood in stool, constipation, diarrhea, nausea and vomiting.   Genitourinary: Negative for dysuria, flank pain, frequency and vaginal discharge.   Musculoskeletal: Positive for arthralgias and back pain. Negative for joint swelling.   Skin: Negative for rash.   Neurological: Negative for speech difficulty, weakness, light-headedness and headaches.   Psychiatric/Behavioral: Negative for behavioral problems and dysphoric mood.       Physical Exam  Vitals:    03/14/17 1024 03/14/17 1049   BP: (!) 166/74 (!) 164/76   BP Location: Left arm    Patient Position: Sitting    BP Method: Manual    Pulse: (!) 118 (!) 116   Resp: 20    Temp: 97.9 °F (36.6 °C)    TempSrc: Oral    SpO2: 97%    Weight: 60.6 kg (133 lb 11.3 oz)    Height: 5' (1.524 m)     Body mass index is 26.11 kg/(m^2).  Weight: 60.6 kg (133 lb 11.3 oz)   Height: 5' (152.4 cm)     Physical Exam   Constitutional: She is oriented to person, place, and time. She appears well-developed and well-nourished. No distress.   Eyes: EOM are normal.   Neck: Neck supple.   Cardiovascular: Normal rate and regular rhythm.  Exam reveals no gallop and no friction rub.    No murmur heard.  Pulmonary/Chest: Effort normal and breath sounds normal. No respiratory distress. She has no wheezes. She has no rales.   Abdominal: Soft. Bowel sounds are normal. She exhibits no distension and no mass. There is no tenderness. There is no rebound and no guarding.   Musculoskeletal:        Arms:  Lymphadenopathy:     She has no cervical adenopathy.   Neurological: She is alert and oriented to  person, place, and time.   Skin: Skin is warm and dry. No rash noted.   Psychiatric: She has a normal mood and affect. Her behavior is normal.   Nursing note and vitals reviewed.      Health Maintenance       Date Due Completion Date    Lipid Panel 10/14/2016 10/14/2015    Urine Microalbumin 10/14/2016 10/14/2015    Eye Exam 3/9/2017 3/9/2016    Mammogram 3/14/2017 3/14/2016    Foot Exam 5/16/2017 5/16/2016    Hemoglobin A1c 5/29/2017 11/29/2016    DEXA SCAN 3/7/2019 3/7/2016    Colonoscopy 2/9/2025 2/9/2015 (Done)    Override on 2/9/2015: Done    Override on 2/18/2005: Done (reportedly normal)    TETANUS VACCINE 5/16/2026 5/16/2016            ASSESSMENT     1. Low back strain, initial encounter    2. Muscle spasm    3. Diabetes mellitus with stage 3 chronic kidney disease    4. Immunosuppression    5. Controlled type 2 diabetes mellitus without complication, without long-term current use of insulin        PLAN:     Low back strain, initial encounter  -she has muscle relaxers, ans she has been using ice and heat  -will resume PT as she has a hx of DJD in her lumbar spine on prior x rays  -reviewed labwork and discussed with pt that markers of inflammation have improved, this is likely musculoskeletal in nature  -     Ambulatory consult to Physical Therapy    Muscle spasm  -     Ambulatory consult to Physical Therapy    Diabetes mellitus with stage 3 chronic kidney disease  -This is a chronic medical condition that is stable under the current regimen. Continue to monitor and we will make medication adjustments as needed.       Immunosuppression  -This is a chronic medical condition that is stable under the current regimen. Continue to monitor and we will make medication adjustments as needed.       Controlled type 2 diabetes mellitus without complication, without long-term current use of insulin   -This is a chronic medical condition that is stable under the current regimen. Continue to monitor and we will make  medication adjustments as needed.       Other orders-we will defer this to another time per her request  -     Cancel: Mammo Digital Screening Bilat with CAD; Future; Expected date: 3/14/17  -     Cancel: Lipid panel; Future; Expected date: 3/14/17  -     Cancel: Hemoglobin A1c; Future; Expected date: 3/14/17           Micaela Mendoza MD  03/15/2017 8:49 AM        Return in about 1 month (around 4/14/2017) for Follow up.

## 2017-03-23 ENCOUNTER — TELEPHONE (OUTPATIENT)
Dept: HEMATOLOGY/ONCOLOGY | Facility: CLINIC | Age: 72
End: 2017-03-23

## 2017-03-23 ENCOUNTER — OFFICE VISIT (OUTPATIENT)
Dept: OPHTHALMOLOGY | Facility: CLINIC | Age: 72
End: 2017-03-23
Payer: MEDICARE

## 2017-03-23 DIAGNOSIS — H17.9 CORNEAL SCAR, RIGHT EYE: Primary | ICD-10-CM

## 2017-03-23 DIAGNOSIS — Z96.1 PSEUDOPHAKIA: ICD-10-CM

## 2017-03-23 DIAGNOSIS — E11.9 DM TYPE 2 WITHOUT RETINOPATHY: ICD-10-CM

## 2017-03-23 DIAGNOSIS — H52.7 REFRACTIVE ERROR: ICD-10-CM

## 2017-03-23 DIAGNOSIS — D63.1 ANEMIA IN CHRONIC KIDNEY DISEASE(285.21): Primary | ICD-10-CM

## 2017-03-23 DIAGNOSIS — H04.123 DRY EYE SYNDROME, BILATERAL: ICD-10-CM

## 2017-03-23 DIAGNOSIS — I10 ESSENTIAL HYPERTENSION: ICD-10-CM

## 2017-03-23 DIAGNOSIS — N18.9 ANEMIA IN CHRONIC KIDNEY DISEASE(285.21): Primary | ICD-10-CM

## 2017-03-23 PROCEDURE — 99999 PR PBB SHADOW E&M-EST. PATIENT-LVL II: CPT | Mod: PBBFAC,,, | Performed by: OPHTHALMOLOGY

## 2017-03-23 PROCEDURE — 92014 COMPRE OPH EXAM EST PT 1/>: CPT | Mod: S$GLB,,, | Performed by: OPHTHALMOLOGY

## 2017-03-23 PROCEDURE — 99499 UNLISTED E&M SERVICE: CPT | Mod: S$GLB,,, | Performed by: OPHTHALMOLOGY

## 2017-03-23 NOTE — PROGRESS NOTES
Subjective:       Patient ID: Regino Lawrence is a 71 y.o. female.    Chief Complaint: After Cataract (After Cataract bilateral)    HPI  Review of Systems    Objective:      Physical Exam    Assessment:       1. Corneal scar, right eye    2. DM type 2 without retinopathy    3. Essential hypertension    4. Dry eye syndrome, bilateral    5. Refractive error    6. Pseudophakia        Plan:       K scar OD-Stable.  DM-No NPDR OU.  HTN-No fretinopathy OU.  JOSELINE-Doing well.   RE-Pt wants MRx.        Control DM & HTN.     AT's.  Give MRx.  RTC 1 yr.

## 2017-03-23 NOTE — MR AVS SNAPSHOT
[facility]  [address] [facility]  [place] [address]  Phone: [phone]  Fax: [phone]                  Regino Lawrence   3/23/2017 9:00 AM   Office Visit    Description:  Female : 1945   Provider:  Elio Azevedo MD   Department:  [facility]           Reason for Visit     After Cataract           Diagnoses this Visit        Comments    Corneal scar, right eye    -  Primary     DM type 2 without retinopathy         Essential hypertension         Dry eye syndrome, bilateral         Refractive error         Pseudophakia                To Do List           Future Appointments        Provider Department Dept Phone    3/24/2017 9:50 AM [facility] [phone]    3/24/2017 10:00 AM CHAIR 05 [facility] [phone]    3/29/2017 9:30 AM NOM [id number] [address] [facility] [phone]    3/29/2017 11:30 AM aFb Conner MD [facility] [address] [phone]    2017 9:00 AM [facility], [facility] [phone]      Goals (5 Years of Data)              11/29/16    10/17/16    3/7/16    COMPLETED: HDL > 40           Related Problems    Combined 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    HEMOGLOBIN A1C < 7.0   5.6  5.6  5.7    Related Problems    Diabetes mellitus type 2, controlled    COMPLETED: LDL CHOLESTEROL < 70           Related Problems    Combined 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      Follow-Up and Disposition     Return in about 1 year (around 3/23/2018) for 1 yr F/U..      [facility]     [facility] -  Assistance  Registered nurses in the [facility] provide clinical advisement, health education, appointment booking, and other advisory services.  Call for this free service at [phone].             Medications           Message regarding Medications     Verify the  changes and/or additions to your medication regime listed below are the same as discussed with your clinician today.  If any of these changes or additions are incorrect, please notify your healthcare provider.             Verify that the below list of medications is an accurate representation of the medications you are currently taking.  If none reported, the list may be blank. If incorrect, please contact your healthcare provider. Carry this list with you in case of emergency.           Current Medications     ACCU-CHEK FASTCLIX Kit USE AS DIRECTED.    ALCOHOL ANTISEPTIC PADS (ALCOHOL PREP PADS TOP)     blood sugar diagnostic Strp 1 each by Misc.(Non-Drug; Combo Route) route once daily.    blood-glucose meter kit Use as instructed    cetirizine (ZYRTEC) 10 MG tablet     cloNIDine (CATAPRES) 0.3 MG tablet Take 1 tablet (0.3 mg total) by mouth 3 (three) times daily.    cyclobenzaprine (FLEXERIL) 10 MG tablet Take 1 tablet (10 mg total) by mouth 3 (three) times daily.    diazePAM (VALIUM) 5 MG tablet Take 1 tablet (5 mg total) by mouth every 6 (six) hours as needed for Anxiety.    fenofibrate 160 MG Tab TAKE 1 TABLET ONE TIME DAILY    lancing device (ACCU-CHEK SOFTCLIX LANCET DEV) Misc Accu-chek FastClix kit    levothyroxine (SYNTHROID) 50 MCG tablet TAKE 1 TABLET (50 MCG TOTAL) BY MOUTH BEFORE BREAKFAST.    lisinopril (PRINIVIL,ZESTRIL) 20 MG tablet TAKE 1 TABLET ONE TIME DAILY    LUBRICANT EYE DROPS, GLYC-PG, 1-0.3 % Drop     metformin (GLUCOPHAGE) 1000 MG tablet Take 1 tablet (1,000 mg total) by mouth 2 (two) times daily with meals.    METHYL SALICYLATE/MENTH/CAMPH (MUSCLE RUB, WITH CAMPHOR, TOP)     nifedipine (NIFEDICAL XL) 60 MG (OSM) 24 hr tablet Take 1 tablet (60 mg total) by mouth once daily.    oxycodone-acetaminophen (PERCOCET) 5-325 mg per tablet Take 1 tablet by mouth every 4 (four) hours as needed for Pain.    potassium chloride SA (K-DUR,KLOR-CON) 20 MEQ tablet Take 1 tablet (20 mEq total) by mouth 2  (two) times daily.    predniSONE (DELTASONE) 10 MG tablet Take 1 tablet (10 mg total) by mouth once daily.    calcium carbonate (OS-MALCOLM) 500 mg calcium (1,250 mg) tablet Take 1 tablet (500 mg total) by mouth 2 (two) times daily.    gabapentin (NEURONTIN) 400 MG capsule Take 1 capsule (400 mg total) by mouth every evening.    ondansetron (ZOFRAN) 8 MG tablet Take 1 tablet (8 mg total) by mouth every 12 (twelve) hours as needed for Nausea.           Clinical Reference Information           Your Vitals Were     Last Period                   (LMP Unknown)           Allergies as of 3/23/2017     Azathioprine      Immunizations Administered on Date of Encounter - 3/23/2017     None      Language Assistance Services     ATTENTION: Language assistance services are available, free of charge. Please call [phone].      ATENCIÓN: Si leandrola maria victoria, tiene a rivas disposición servicios gratuitos de asistencia lingüística. Llame al [phone].     JULIAN Ý: N?u b?n nói Ti?ng Vi?t, có các d?ch v? h? tr? ngôn ng? mi?n phí dành cho b?n. G?i s? [phone].         [facility] complies with applicable Federal civil rights laws and does not discriminate on the basis of race, color, national origin, age, disability, or sex.

## 2017-03-24 ENCOUNTER — INFUSION (OUTPATIENT)
Dept: INFUSION THERAPY | Facility: HOSPITAL | Age: 72
End: 2017-03-24
Attending: INTERNAL MEDICINE
Payer: MEDICARE

## 2017-03-24 DIAGNOSIS — N18.9 ANEMIA IN CKD (CHRONIC KIDNEY DISEASE): Primary | ICD-10-CM

## 2017-03-24 DIAGNOSIS — Z53.1 REFUSAL OF BLOOD TRANSFUSIONS AS PATIENT IS JEHOVAH'S WITNESS: ICD-10-CM

## 2017-03-24 DIAGNOSIS — D63.1 ANEMIA IN CKD (CHRONIC KIDNEY DISEASE): Primary | ICD-10-CM

## 2017-03-24 PROCEDURE — 63600175 PHARM REV CODE 636 W HCPCS: Performed by: INTERNAL MEDICINE

## 2017-03-24 PROCEDURE — 96372 THER/PROPH/DIAG INJ SC/IM: CPT

## 2017-03-24 RX ADMIN — ERYTHROPOIETIN 10000 UNITS: 10000 INJECTION, SOLUTION INTRAVENOUS; SUBCUTANEOUS at 10:03

## 2017-03-28 DIAGNOSIS — E11.8 CONTROLLED DIABETES MELLITUS TYPE 2 WITH COMPLICATIONS, UNSPECIFIED LONG TERM INSULIN USE STATUS: ICD-10-CM

## 2017-03-28 RX ORDER — LANCING DEVICE/LANCETS
KIT MISCELLANEOUS
Qty: 1 EACH | Refills: 0 | Status: SHIPPED | OUTPATIENT
Start: 2017-03-28 | End: 2018-12-07 | Stop reason: SDUPTHER

## 2017-03-29 ENCOUNTER — OFFICE VISIT (OUTPATIENT)
Dept: NEUROSURGERY | Facility: CLINIC | Age: 72
End: 2017-03-29
Payer: MEDICARE

## 2017-03-29 ENCOUNTER — HOSPITAL ENCOUNTER (OUTPATIENT)
Dept: RADIOLOGY | Facility: HOSPITAL | Age: 72
Discharge: HOME | End: 2017-03-29
Attending: NEUROLOGICAL SURGERY
Payer: MEDICARE

## 2017-03-29 VITALS
SYSTOLIC BLOOD PRESSURE: 112 MMHG | DIASTOLIC BLOOD PRESSURE: 68 MMHG | HEIGHT: 60 IN | BODY MASS INDEX: 26.23 KG/M2 | WEIGHT: 133.63 LBS | HEART RATE: 105 BPM

## 2017-03-29 DIAGNOSIS — M48.02 CERVICAL SPINAL STENOSIS: Primary | ICD-10-CM

## 2017-03-29 DIAGNOSIS — M48.02 CERVICAL SPINAL STENOSIS: ICD-10-CM

## 2017-03-29 PROCEDURE — 3078F DIAST BP <80 MM HG: CPT | Mod: S$GLB,,, | Performed by: NEUROLOGICAL SURGERY

## 2017-03-29 PROCEDURE — 1157F ADVNC CARE PLAN IN RCRD: CPT | Mod: S$GLB,,, | Performed by: NEUROLOGICAL SURGERY

## 2017-03-29 PROCEDURE — 1159F MED LIST DOCD IN RCRD: CPT | Mod: S$GLB,,, | Performed by: NEUROLOGICAL SURGERY

## 2017-03-29 PROCEDURE — 1126F AMNT PAIN NOTED NONE PRSNT: CPT | Mod: S$GLB,,, | Performed by: NEUROLOGICAL SURGERY

## 2017-03-29 PROCEDURE — 99999 PR PBB SHADOW E&M-EST. PATIENT-LVL III: CPT | Mod: PBBFAC,,, | Performed by: NEUROLOGICAL SURGERY

## 2017-03-29 PROCEDURE — 1160F RVW MEDS BY RX/DR IN RCRD: CPT | Mod: S$GLB,,, | Performed by: NEUROLOGICAL SURGERY

## 2017-03-29 PROCEDURE — 3074F SYST BP LT 130 MM HG: CPT | Mod: S$GLB,,, | Performed by: NEUROLOGICAL SURGERY

## 2017-03-29 PROCEDURE — 99214 OFFICE O/P EST MOD 30 MIN: CPT | Mod: S$GLB,,, | Performed by: NEUROLOGICAL SURGERY

## 2017-03-29 PROCEDURE — 72040 X-RAY EXAM NECK SPINE 2-3 VW: CPT | Mod: 26,,, | Performed by: RADIOLOGY

## 2017-03-29 NOTE — PROGRESS NOTES
"Subjective:    I, Jessi Donald, am scribing for, and in the presence of, Dr. Fab Conner.     Patient ID: Regino Lawrence is a 71 y.o. female.    Chief Complaint: No chief complaint on file.    HPI This is a 71-year-old female with cervical spinal stenosis who presents today for 1-year follow up. The pt states that she is doing well without significant complaints. She reports "aching" neck pain at night. She takes pain medication as needed to help with sleeping.    She presents with a walking cane.    Review of Systems   Constitutional: Negative for activity change, fatigue and fever.   HENT: Negative for facial swelling.    Eyes: Negative.    Respiratory: Negative.    Cardiovascular: Negative.    Gastrointestinal: Negative for diarrhea, nausea and vomiting.   Genitourinary: Negative.    Musculoskeletal: Positive for neck pain. Negative for back pain, joint swelling and myalgias.   Neurological: Negative for seizures, weakness, numbness and headaches.   Psychiatric/Behavioral: Negative.        Past Medical History:   Diagnosis Date    Acid reflux     Allergy     Alopecia     Anemia     Anxiety     Arthritis     Cataract     Chronic kidney disease     Depression     Diabetes mellitus, type 2     Eye injury as a child     k-abrasion  od    Hyperlipidemia     Hypertension     Hypothyroidism     Myalgia and myositis 9/6/2012    Osteoporosis     Sarcoidosis     Ulcer     no cancer     Objective:     /68  Pulse 105  Ht 5' (1.524 m)  Wt 60.6 kg (133 lb 9.6 oz)  LMP  (LMP Unknown)  BMI 26.09 kg/m2  Physical Exam   Constitutional: She is oriented to person, place, and time. She appears well-developed and well-nourished.   HENT:   Head: Normocephalic and atraumatic.   Neck: Neck supple.   Neurological: She is alert and oriented to person, place, and time. No cranial nerve deficit. She displays a negative Romberg sign. GCS eye subscore is 4. GCS verbal subscore is 5. GCS motor subscore is 6.     "   Imaging:  AP and lateral x-ray of the cervical spine, dated 3/29/2017, shows solid bone formation with good hardware position.    I have personally reviewed the images with the pt.       I, Dr. Fab Conner, personally performed the services described in this documentation as scribed by Jessi Donald in my presence, and it is both accurate and complete.  Assessment:       Cervical stenosis.    Plan:   I have reviewed the AP and latera x-ray of the cervical spine with the patient, which shows solid bone formation with good hardware position. She is doing well. She can follow up with me as needed for any new complaints or concerns.

## 2017-03-31 ENCOUNTER — PATIENT MESSAGE (OUTPATIENT)
Dept: FAMILY MEDICINE | Facility: CLINIC | Age: 72
End: 2017-03-31

## 2017-04-03 ENCOUNTER — PATIENT MESSAGE (OUTPATIENT)
Dept: FAMILY MEDICINE | Facility: CLINIC | Age: 72
End: 2017-04-03

## 2017-04-03 DIAGNOSIS — Z12.31 SCREENING MAMMOGRAM, ENCOUNTER FOR: Primary | ICD-10-CM

## 2017-04-07 ENCOUNTER — PATIENT MESSAGE (OUTPATIENT)
Dept: FAMILY MEDICINE | Facility: CLINIC | Age: 72
End: 2017-04-07

## 2017-04-07 ENCOUNTER — INFUSION (OUTPATIENT)
Dept: INFUSION THERAPY | Facility: HOSPITAL | Age: 72
End: 2017-04-07
Attending: INTERNAL MEDICINE
Payer: MEDICARE

## 2017-04-07 DIAGNOSIS — Z53.1 REFUSAL OF BLOOD TRANSFUSIONS AS PATIENT IS JEHOVAH'S WITNESS: ICD-10-CM

## 2017-04-07 DIAGNOSIS — E11.9 CONTROLLED TYPE 2 DIABETES MELLITUS WITHOUT COMPLICATION, WITHOUT LONG-TERM CURRENT USE OF INSULIN: ICD-10-CM

## 2017-04-07 DIAGNOSIS — N18.9 ANEMIA IN CKD (CHRONIC KIDNEY DISEASE): Primary | ICD-10-CM

## 2017-04-07 DIAGNOSIS — D63.1 ANEMIA IN CKD (CHRONIC KIDNEY DISEASE): Primary | ICD-10-CM

## 2017-04-07 PROCEDURE — 96372 THER/PROPH/DIAG INJ SC/IM: CPT

## 2017-04-07 PROCEDURE — 63600175 PHARM REV CODE 636 W HCPCS: Performed by: INTERNAL MEDICINE

## 2017-04-07 RX ADMIN — ERYTHROPOIETIN 10000 UNITS: 10000 INJECTION, SOLUTION INTRAVENOUS; SUBCUTANEOUS at 10:04

## 2017-04-10 RX ORDER — INSULIN PUMP SYRINGE, 3 ML
EACH MISCELLANEOUS
Qty: 1 EACH | Refills: 0 | Status: SHIPPED | OUTPATIENT
Start: 2017-04-10

## 2017-04-10 RX ORDER — INSULIN PUMP SYRINGE, 3 ML
EACH MISCELLANEOUS
Qty: 1 EACH | Refills: 0 | Status: SHIPPED | OUTPATIENT
Start: 2017-04-10 | End: 2017-04-10

## 2017-04-17 ENCOUNTER — HOSPITAL ENCOUNTER (OUTPATIENT)
Dept: RADIOLOGY | Facility: HOSPITAL | Age: 72
Discharge: HOME | End: 2017-04-17
Attending: FAMILY MEDICINE
Payer: MEDICARE

## 2017-04-17 DIAGNOSIS — Z12.31 SCREENING MAMMOGRAM, ENCOUNTER FOR: ICD-10-CM

## 2017-04-17 PROCEDURE — 77067 SCR MAMMO BI INCL CAD: CPT | Mod: TC

## 2017-04-17 PROCEDURE — 77067 SCR MAMMO BI INCL CAD: CPT | Mod: 26,,, | Performed by: RADIOLOGY

## 2017-04-21 ENCOUNTER — INFUSION (OUTPATIENT)
Dept: INFUSION THERAPY | Facility: HOSPITAL | Age: 72
End: 2017-04-21
Attending: INTERNAL MEDICINE
Payer: MEDICARE

## 2017-04-21 DIAGNOSIS — N18.9 ANEMIA IN CKD (CHRONIC KIDNEY DISEASE): Primary | ICD-10-CM

## 2017-04-21 DIAGNOSIS — Z53.1 REFUSAL OF BLOOD TRANSFUSIONS AS PATIENT IS JEHOVAH'S WITNESS: ICD-10-CM

## 2017-04-21 DIAGNOSIS — D63.1 ANEMIA IN CKD (CHRONIC KIDNEY DISEASE): Primary | ICD-10-CM

## 2017-04-21 PROCEDURE — 63600175 PHARM REV CODE 636 W HCPCS: Performed by: INTERNAL MEDICINE

## 2017-04-21 PROCEDURE — 96372 THER/PROPH/DIAG INJ SC/IM: CPT

## 2017-04-21 RX ADMIN — ERYTHROPOIETIN 10000 UNITS: 10000 INJECTION, SOLUTION INTRAVENOUS; SUBCUTANEOUS at 10:04

## 2017-04-24 ENCOUNTER — PATIENT MESSAGE (OUTPATIENT)
Dept: FAMILY MEDICINE | Facility: CLINIC | Age: 72
End: 2017-04-24

## 2017-04-24 DIAGNOSIS — E11.22 DIABETES MELLITUS WITH STAGE 3 CHRONIC KIDNEY DISEASE: Primary | Chronic | ICD-10-CM

## 2017-04-24 DIAGNOSIS — N18.30 DIABETES MELLITUS WITH STAGE 3 CHRONIC KIDNEY DISEASE: Primary | Chronic | ICD-10-CM

## 2017-05-05 ENCOUNTER — INFUSION (OUTPATIENT)
Dept: INFUSION THERAPY | Facility: HOSPITAL | Age: 72
End: 2017-05-05
Attending: INTERNAL MEDICINE
Payer: MEDICARE

## 2017-05-05 ENCOUNTER — HOSPITAL ENCOUNTER (OUTPATIENT)
Dept: RADIOLOGY | Facility: CLINIC | Age: 72
Discharge: HOME | End: 2017-05-05
Attending: INTERNAL MEDICINE
Payer: MEDICARE

## 2017-05-05 ENCOUNTER — OFFICE VISIT (OUTPATIENT)
Dept: RHEUMATOLOGY | Facility: CLINIC | Age: 72
End: 2017-05-05
Payer: MEDICARE

## 2017-05-05 VITALS
SYSTOLIC BLOOD PRESSURE: 151 MMHG | HEIGHT: 60 IN | DIASTOLIC BLOOD PRESSURE: 82 MMHG | WEIGHT: 134.13 LBS | TEMPERATURE: 98 F | BODY MASS INDEX: 26.33 KG/M2 | HEART RATE: 88 BPM

## 2017-05-05 DIAGNOSIS — E11.9 CONTROLLED TYPE 2 DIABETES MELLITUS WITHOUT COMPLICATION, WITHOUT LONG-TERM CURRENT USE OF INSULIN: Chronic | ICD-10-CM

## 2017-05-05 DIAGNOSIS — D63.1 ANEMIA IN CKD (CHRONIC KIDNEY DISEASE): Primary | ICD-10-CM

## 2017-05-05 DIAGNOSIS — R53.83 FATIGUE, UNSPECIFIED TYPE: ICD-10-CM

## 2017-05-05 DIAGNOSIS — G72.9 MYOPATHY: ICD-10-CM

## 2017-05-05 DIAGNOSIS — D86.9 SARCOIDOSIS: Chronic | ICD-10-CM

## 2017-05-05 DIAGNOSIS — D86.9 SARCOIDOSIS: Primary | Chronic | ICD-10-CM

## 2017-05-05 DIAGNOSIS — M85.80 OSTEOPENIA, UNSPECIFIED LOCATION: ICD-10-CM

## 2017-05-05 DIAGNOSIS — N18.9 ANEMIA IN CKD (CHRONIC KIDNEY DISEASE): Primary | ICD-10-CM

## 2017-05-05 DIAGNOSIS — Z79.52 CURRENT USE OF STEROID MEDICATION: ICD-10-CM

## 2017-05-05 DIAGNOSIS — Z53.1 REFUSAL OF BLOOD TRANSFUSIONS AS PATIENT IS JEHOVAH'S WITNESS: ICD-10-CM

## 2017-05-05 PROCEDURE — 1157F ADVNC CARE PLAN IN RCRD: CPT | Mod: S$GLB,,, | Performed by: INTERNAL MEDICINE

## 2017-05-05 PROCEDURE — 1125F AMNT PAIN NOTED PAIN PRSNT: CPT | Mod: S$GLB,,, | Performed by: INTERNAL MEDICINE

## 2017-05-05 PROCEDURE — 99214 OFFICE O/P EST MOD 30 MIN: CPT | Mod: 25,S$GLB,, | Performed by: INTERNAL MEDICINE

## 2017-05-05 PROCEDURE — 63600175 PHARM REV CODE 636 W HCPCS: Performed by: INTERNAL MEDICINE

## 2017-05-05 PROCEDURE — 96372 THER/PROPH/DIAG INJ SC/IM: CPT

## 2017-05-05 PROCEDURE — 3079F DIAST BP 80-89 MM HG: CPT | Mod: S$GLB,,, | Performed by: INTERNAL MEDICINE

## 2017-05-05 PROCEDURE — 1159F MED LIST DOCD IN RCRD: CPT | Mod: S$GLB,,, | Performed by: INTERNAL MEDICINE

## 2017-05-05 PROCEDURE — 77080 DXA BONE DENSITY AXIAL: CPT | Mod: 26,,, | Performed by: INTERNAL MEDICINE

## 2017-05-05 PROCEDURE — 96372 THER/PROPH/DIAG INJ SC/IM: CPT | Mod: S$GLB,,, | Performed by: INTERNAL MEDICINE

## 2017-05-05 PROCEDURE — 99999 PR PBB SHADOW E&M-EST. PATIENT-LVL III: CPT | Mod: PBBFAC,,, | Performed by: INTERNAL MEDICINE

## 2017-05-05 PROCEDURE — 1160F RVW MEDS BY RX/DR IN RCRD: CPT | Mod: S$GLB,,, | Performed by: INTERNAL MEDICINE

## 2017-05-05 PROCEDURE — 4010F ACE/ARB THERAPY RXD/TAKEN: CPT | Mod: S$GLB,,, | Performed by: INTERNAL MEDICINE

## 2017-05-05 PROCEDURE — 3077F SYST BP >= 140 MM HG: CPT | Mod: S$GLB,,, | Performed by: INTERNAL MEDICINE

## 2017-05-05 PROCEDURE — 99499 UNLISTED E&M SERVICE: CPT | Mod: S$GLB,,, | Performed by: INTERNAL MEDICINE

## 2017-05-05 PROCEDURE — 3044F HG A1C LEVEL LT 7.0%: CPT | Mod: S$GLB,,, | Performed by: INTERNAL MEDICINE

## 2017-05-05 RX ORDER — PREDNISONE 10 MG/1
10 TABLET ORAL DAILY
Qty: 90 TABLET | Refills: 1 | Status: SHIPPED | OUTPATIENT
Start: 2017-05-05 | End: 2017-12-05 | Stop reason: SDUPTHER

## 2017-05-05 RX ORDER — TRIAMCINOLONE ACETONIDE 40 MG/ML
80 INJECTION, SUSPENSION INTRA-ARTICULAR; INTRAMUSCULAR
Status: COMPLETED | OUTPATIENT
Start: 2017-05-05 | End: 2017-05-05

## 2017-05-05 RX ORDER — TRAMADOL HYDROCHLORIDE 50 MG/1
50 TABLET ORAL EVERY 8 HOURS PRN
Qty: 90 TABLET | Refills: 2 | Status: SHIPPED | OUTPATIENT
Start: 2017-05-05 | End: 2017-05-15

## 2017-05-05 RX ADMIN — TRIAMCINOLONE ACETONIDE 80 MG: 40 INJECTION, SUSPENSION INTRA-ARTICULAR; INTRAMUSCULAR at 11:05

## 2017-05-05 RX ADMIN — ERYTHROPOIETIN 10000 UNITS: 10000 INJECTION, SOLUTION INTRAVENOUS; SUBCUTANEOUS at 09:05

## 2017-05-05 ASSESSMENT — ROUTINE ASSESSMENT OF PATIENT INDEX DATA (RAPID3)
PAIN SCORE: 10
PATIENT GLOBAL ASSESSMENT SCORE: 9
AM STIFFNESS SCORE: 1, YES
FATIGUE SCORE: 9
WHEN YOU AWAKENED IN THE MORNING OVER THE LAST WEEK, PLEASE INDICATE THE AMOUNT OF TIME IT TAKES UNTIL YOU ARE AS LIMBER AS YOU WILL BE FOR THE DAY: 15 MINUTES
MDHAQ FUNCTION SCORE: .6
PSYCHOLOGICAL DISTRESS SCORE: 0
TOTAL RAPID3 SCORE: 7

## 2017-05-05 NOTE — MR AVS SNAPSHOT
[facility]  [address]  [facility] [address]  Phone: [phone]  Fax: [phone]                  Regino Lawrence   2017 11:00 AM   Office Visit    Description:  Female : 1945   Provider:  Leti Ruggiero MD   Department:  [facility]           Reason for Visit     Disease Management           Diagnoses this Visit        Comments    Sarcoidosis    -  Primary     Fatigue, unspecified type         Myopathy         Osteopenia, unspecified location         Controlled type 2 diabetes mellitus without complication, without long-term current use of insulin                To Do List           Future Appointments        Provider Department Dept Phone    2017  3:20 PM [facility], DEXA1 [facility] [phone]    2017 9:15 AM [facility], [facility] [place] [phone]    2017 9:00 AM Edith Pacheco MD [facility] [place] Oncology [phone]    2017 11:00 AM [facility], [facility] [phone]    2017 3:00 PM Benjie Lau MD [facility] [phone]      Goals (5 Years of Data)              11/29/16    10/17/16    3/7/16    COMPLETED: HDL > 40           Related Problems    Combined 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    HEMOGLOBIN A1C < 7.0   5.6  5.6  5.7    Related Problems    Diabetes mellitus type 2, controlled    COMPLETED: LDL CHOLESTEROL < 70           Related Problems    Combined 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      Follow-Up and Disposition     Return in about 4 months (around 2017).       These Medications        Disp Refills Start End    tramadol (ULTRAM) 50 mg tablet 90 tablet 2 2017 5/15/2017    Take 1 tablet (50 mg total) by mouth every 8 (eight) hours as needed for Pain. - Oral    Pharmacy: [facility] [address] [facility] [place] - [address] [address] Ph #: [phone]       predniSONE (DELTASONE) 10 MG tablet  90 tablet 1 5/5/2017     Take 1 tablet (10 mg total) by mouth once daily. - Oral    Pharmacy: [facility] - [place], [place] - [address] Ph #: [phone]         [facility]     [facility] On [facility] Nurse Care Line - 24/7 Assistance  Unless otherwise directed by your provider, please contact [facility], our nurse care line that is available for 24/7 assistance.     Registered nurses in the [facility] provide: appointment scheduling, clinical advisement, health education, and other advisory services.  Call: [phone] (toll free)               Medications           Message regarding Medications     Verify the changes and/or additions to your medication regime listed below are the same as discussed with your clinician today.  If any of these changes or additions are incorrect, please notify your healthcare provider.        START taking these NEW medications        Refills    tramadol (ULTRAM) 50 mg tablet 2    Sig: Take 1 tablet (50 mg total) by mouth every 8 (eight) hours as needed for Pain.    Class: Print    Route: Oral      These medications were administered today        Dose Freq    triamcinolone acetonide injection 80 mg 80 mg Clinic/HOD 1 time    Sig: Inject 2 mLs (80 mg total) into the muscle one time.    Class: Normal    Route: Intramuscular      STOP taking these medications     oxycodone-acetaminophen (PERCOCET) 5-325 mg per tablet Take 1 tablet by mouth every 4 (four) hours as needed for Pain.           Verify that the below list of medications is an accurate representation of the medications you are currently taking.  If none reported, the list may be blank. If incorrect, please contact your healthcare provider. Carry this list with you in case of emergency.           Current Medications     ACCU-CHEK FASTCLIX Kit USE AS DIRECTED.    ALCOHOL ANTISEPTIC PADS (ALCOHOL PREP PADS TOP)     blood sugar diagnostic Strp 1 each by Misc.(Non-Drug; Combo Route) route  once daily.    blood-glucose meter kit Use as instructed    cetirizine (ZYRTEC) 10 MG tablet     cloNIDine (CATAPRES) 0.3 MG tablet Take 1 tablet (0.3 mg total) by mouth 3 (three) times daily.    cyclobenzaprine (FLEXERIL) 10 MG tablet Take 1 tablet (10 mg total) by mouth 3 (three) times daily.    fenofibrate 160 MG Tab TAKE 1 TABLET ONE TIME DAILY    lancing device (ACCU-CHEK SOFTCLIX LANCET DEV) Misc Accu-chek FastClix kit    levothyroxine (SYNTHROID) 50 MCG tablet TAKE 1 TABLET (50 MCG TOTAL) BY MOUTH BEFORE BREAKFAST.    lisinopril (PRINIVIL,ZESTRIL) 20 MG tablet TAKE 1 TABLET ONE TIME DAILY    LUBRICANT EYE DROPS, GLYC-PG, 1-0.3 % Drop     metformin (GLUCOPHAGE) 1000 MG tablet Take 1 tablet (1,000 mg total) by mouth 2 (two) times daily with meals.    METHYL SALICYLATE/MENTH/CAMPH (MUSCLE RUB, WITH CAMPHOR, TOP)     nifedipine (NIFEDICAL XL) 60 MG (OSM) 24 hr tablet Take 1 tablet (60 mg total) by mouth once daily.    ondansetron (ZOFRAN) 8 MG tablet Take 1 tablet (8 mg total) by mouth every 12 (twelve) hours as needed for Nausea.    potassium chloride SA (K-DUR,KLOR-CON) 20 MEQ tablet Take 1 tablet (20 mEq total) by mouth 2 (two) times daily.    predniSONE (DELTASONE) 10 MG tablet Take 1 tablet (10 mg total) by mouth once daily.    calcium carbonate (OS-MALCOLM) 500 mg calcium (1,250 mg) tablet Take 1 tablet (500 mg total) by mouth 2 (two) times daily.    diazePAM (VALIUM) 5 MG tablet Take 1 tablet (5 mg total) by mouth every 6 (six) hours as needed for Anxiety.    gabapentin (NEURONTIN) 400 MG capsule Take 1 capsule (400 mg total) by mouth every evening.    tramadol (ULTRAM) 50 mg tablet Take 1 tablet (50 mg total) by mouth every 8 (eight) hours as needed for Pain.           Clinical Reference Information           Your Vitals Were     BP Pulse Temp Height Weight Last Period    151/82 (BP Location: Right arm, Patient Position: Sitting, BP Method: Automatic) 88 97.7 °F (36.5 °C) (Oral) 5' (1.524 m) 60.8 kg (134 lb  1.6 oz) (LMP Unknown)    BMI                26.19 kg/m2          Blood Pressure          Most Recent Value    BP  (!)  151/82      Allergies as of 5/5/2017     Azathioprine      Immunizations Administered on Date of Encounter - 5/5/2017     None      Administrations This Visit     triamcinolone acetonide injection 80 mg     Admin Date Action Dose Route Administered By             05/05/2017 Given 80 mg Intramuscular Yudith Ruiz RN                      Instructions      Denosumab injection  What is this medicine?  DENOSUMAB (den oh jeannette mab) slows bone breakdown. Prolia is used to treat osteoporosis in women after menopause and in men. Xgeva is used to prevent bone fractures and other bone problems caused by cancer bone metastases. Xgeva is also used to treat giant cell tumor of the bone.  How should I use this medicine?  This medicine is for injection under the skin. It is given by a health care professional in a hospital or clinic setting.  If you are getting Prolia, a special MedGuide will be given to you by the pharmacist with each prescription and refill. Be sure to read this information carefully each time.  For Prolia, talk to your pediatrician regarding the use of this medicine in children. Special care may be needed. For Xgeva, talk to your pediatrician regarding the use of this medicine in children. While this drug may be prescribed for children as young as 13 years for selected conditions, precautions do apply.  What side effects may I notice from receiving this medicine?  Side effects that you should report to your doctor or health care professional as soon as possible:  · allergic reactions like skin rash, itching or hives, swelling of the face, lips, or tongue  · breathing problems  · chest pain  · fast, irregular heartbeat  · feeling faint or lightheaded, falls  · fever, chills, or any other sign of infection  · muscle spasms, tightening, or twitches  · numbness or tingling  · skin blisters or  bumps, or is dry, peels, or red  · slow healing or unexplained pain in the mouth or jaw  · unusual bleeding or bruising  Side effects that usually do not require medical attention (Report these to your doctor or health care professional if they continue or are bothersome.):  · muscle pain  · stomach upset, gas  What may interact with this medicine?  Do not take this medicine with any of the following medications:  · other medicines containing denosumab  This medicine may also interact with the following medications:  · medicines that suppress the immune system  · medicines that treat cancer  · steroid medicines like prednisone or cortisone  What if I miss a dose?  It is important not to miss your dose. Call your doctor or health care professional if you are unable to keep an appointment.  Where should I keep my medicine?  This medicine is only given in a clinic, doctor's office, or other health care setting and will not be stored at home.  What should I tell my health care provider before I take this medicine?  They need to know if you have any of these conditions:  · dental disease  · eczema  · infection or history of infections  · kidney disease or on dialysis  · low blood calcium or vitamin D  · malabsorption syndrome  · scheduled to have surgery or tooth extraction  · taking medicine that contains denosumab  · thyroid or parathyroid disease  · an unusual reaction to denosumab, other medicines, foods, dyes, or preservatives  · pregnant or trying to get pregnant  · breast-feeding  What should I watch for while using this medicine?  Visit your doctor or health care professional for regular checks on your progress. Your doctor or health care professional may order blood tests and other tests to see how you are doing.  Call your doctor or health care professional if you get a cold or other infection while receiving this medicine. Do not treat yourself. This medicine may decrease your body's ability to fight  infection.  You should make sure you get enough calcium and vitamin D while you are taking this medicine, unless your doctor tells you not to. Discuss the foods you eat and the vitamins you take with your health care professional.  See your dentist regularly. Brush and floss your teeth as directed. Before you have any dental work done, tell your dentist you are receiving this medicine.  Do not become pregnant while taking this medicine or for 5 months after stopping it. Women should inform their doctor if they wish to become pregnant or think they might be pregnant. There is a potential for serious side effects to an unborn child. Talk to your health care professional or pharmacist for more information.  Date Last Reviewed:   NOTE:This sheet is a summary. It may not cover all possible information. If you have questions about this medicine, talk to your doctor, pharmacist, or health care provider. Copyright© 2016 [employer]             Language Assistance Services     ATTENTION: Language assistance services are available, free of charge. Please call [phone].      ATENCIÓN: Si leandrola maria victoria, tiene a rivas disposición servicios gratuitos de asistencia lingüística. Llame al [phone].     [facility] Ý: N?u b?n nói Ti?ng Vi?t, có các d?ch v? h? tr? ngôn ng? mi?n phí dành cho b?n. G?i s? [phone].         [facility] complies with applicable Federal civil rights laws and does not discriminate on the basis of race, color, national origin, age, disability, or sex.

## 2017-05-05 NOTE — PROGRESS NOTES
Subjective:       Patient ID: Regino Lawrence is a 71 y.o. female.    Chief Complaint: Disease Management      HPI:  Regino Lawrence is a 71 y.o. female with history of sarcoidosis with associated myopathy and   arthropathy. Sarcoidosis that was first manifested by muscle inflammation, low white   blood cell count, hair loss, skin involvement. She was treated in the   past with methotrexate and Plaquenil, both of which were ineffective.   Cellcept and imuran caused some unknown side effect.   Colchicine was held due to low WBC.   Although methotrexate did not help in past it was retried and she felt it helped hair growth but did not help body aches.   She held MTX due to an URI but patient has not wanted restarted since then (2013).       She s/p laminectomy-cervical fusion C3-C7 11/16/2015 for cervical spinal stenosis.     S/p surgery for bladder prolapse. Required Epogen before surgery and had 2 infusions after surgery.   Last Epogen today.     Now with swelling in n hands and pain all over. Pain 10/10 ache that improves with pain medication.   Improves with tramadol.  Worsens with use of joints.   NG=316    Review of Systems   Constitutional: Positive for fatigue.   HENT:        Dry mouth   Eyes:        Dry eyes   Respiratory: Positive for shortness of breath (with rushing).    Gastrointestinal: Negative.    Genitourinary: Negative.    Musculoskeletal: Positive for arthralgias and myalgias.   Skin: Negative.    Allergic/Immunologic: Negative.    Neurological: Negative.    Hematological: Negative.    Psychiatric/Behavioral: Negative.          Objective:   BP (!) 151/82 (BP Location: Right arm, Patient Position: Sitting, BP Method: Automatic)  Pulse 88  Temp 97.7 °F (36.5 °C) (Oral)   Ht 5' (1.524 m)  Wt 60.8 kg (134 lb 1.6 oz)  LMP  (LMP Unknown)  BMI 26.19 kg/m2     Physical Exam   Constitutional: She is oriented to person, place, and time and well-developed, well-nourished, and in no distress.   HENT:    Head: Normocephalic and atraumatic.   Eyes: Conjunctivae and EOM are normal.   Neck: Neck supple.   Cardiovascular: Normal rate, regular rhythm and normal heart sounds.    Pulmonary/Chest: Effort normal and breath sounds normal.   Abdominal: Soft. Bowel sounds are normal.   Neurological: She is alert and oriented to person, place, and time.   With cane   Skin: Skin is warm and dry.     Psychiatric: Mood and affect normal.   Musculoskeletal: Normal range of motion. She exhibits edema and tenderness.   Pain on palpation all MCPs, shoulders, knees  10/18 FM points           LABS    Component      Latest Ref Rng & Units 5/5/2017 3/10/2017   WBC      3.90 - 12.70 K/uL 4.50    RBC      4.00 - 5.40 M/uL 3.27 (L)    Hemoglobin      12.0 - 16.0 g/dL 10.2 (L)    Hematocrit      37.0 - 48.5 % 31.8 (L)    MCV      82 - 98 fL 97    MCH      27.0 - 31.0 pg 31.2 (H)    MCHC      32.0 - 36.0 % 32.1    RDW      11.5 - 14.5 % 12.9    Platelets      150 - 350 K/uL 299    MPV      9.2 - 12.9 fL 8.7 (L)    Gran #      1.8 - 7.7 K/uL 3.1    Lymph #      1.0 - 4.8 K/uL 1.1    Mono #      0.3 - 1.0 K/uL 0.3    Eos #      0.0 - 0.5 K/uL 0.0    Baso #      0.00 - 0.20 K/uL 0.01    Gran%      38.0 - 73.0 % 67.8    Lymph%      18.0 - 48.0 % 24.7    Mono%      4.0 - 15.0 % 6.9    Eosinophil%      0.0 - 8.0 % 0.4    Basophil%      0.0 - 1.9 % 0.2    Differential Method       Automated    Sodium      136 - 145 mmol/L  136   Potassium      3.5 - 5.1 mmol/L  4.1   Chloride      95 - 110 mmol/L  102   CO2      23 - 29 mmol/L  23   Glucose      70 - 110 mg/dL  270 (H)   BUN, Bld      8 - 23 mg/dL  19   Creatinine      0.5 - 1.4 mg/dL  1.3   Calcium      8.7 - 10.5 mg/dL  9.3   Total Protein      6.0 - 8.4 g/dL  7.0   Albumin      3.5 - 5.2 g/dL  3.6   Total Bilirubin      0.1 - 1.0 mg/dL  0.3   Alkaline Phosphatase      55 - 135 U/L  81   AST      10 - 40 U/L  12   ALT      10 - 44 U/L  8 (L)   Anion Gap      8 - 16 mmol/L  11   eGFR if African  American      >60 mL/min/1.73 m:2  47.7 (A)   eGFR if non African American      >60 mL/min/1.73 m:2  41.4 (A)   Aldolase      1.2 - 7.6 U/L  2.4   CPK      20 - 180 U/L  118   CRP      0.0 - 8.2 mg/L  2.7   Sed Rate      0 - 20 mm/Hr  14        Assessment:       1.   Sarcoidosis. Manifested by myopathy and arthropathy. Persistent joint pain and myalgias.  Improved on Flexeril in past now with flare.   2.   Myalgia and myositis. Improved with Flexeril from orthopedic.    3.   Osteopenia. Took Fosamax for 5 years stopped 6/2013. DEXA 3/2016   4.   Fatigue     5.   Diabetes mellitus type 2 in nonobese     6. Neck pain. X-ray with degenerative changes. S/p laminectomy-cervical fusion C3-C7 11/16/2015 for cervical spinal stenosis.    7. Back pain    8. HTN.    Plan:       1. Labs   2. Kenalog 80 mg IM.  Continue prednisone 10 mg daily. Consider retrying Plaquenil after kidney and anemia issues settled.    3. Continue tramadol for pain. Can take 3 times daily.   4. Following with nephrology  5. Preliminary DEXA with osteopenia of hip total and femoral neck. FRAX does not suggest treatment however with prednisone>7.5 mg will consider Prolia (due renal insufficiency).  Information provided for patient to review.  She will let me know how she would like to proceed in the next 1-2 weeks.              RTO in 4 months.    Patient seen face to face for 25 minutes and greater than 50% spent in counseling regarding Joint pains,   management of sarcoidosis and pain.

## 2017-05-05 NOTE — PATIENT INSTRUCTIONS
Denosumab injection  What is this medicine?  DENOSUMAB (den oh jeannette mab) slows bone breakdown. Prolia is used to treat osteoporosis in women after menopause and in men. Xgeva is used to prevent bone fractures and other bone problems caused by cancer bone metastases. Xgeva is also used to treat giant cell tumor of the bone.  How should I use this medicine?  This medicine is for injection under the skin. It is given by a health care professional in a hospital or clinic setting.  If you are getting Prolia, a special MedGuide will be given to you by the pharmacist with each prescription and refill. Be sure to read this information carefully each time.  For Prolia, talk to your pediatrician regarding the use of this medicine in children. Special care may be needed. For Xgeva, talk to your pediatrician regarding the use of this medicine in children. While this drug may be prescribed for children as young as 13 years for selected conditions, precautions do apply.  What side effects may I notice from receiving this medicine?  Side effects that you should report to your doctor or health care professional as soon as possible:  · allergic reactions like skin rash, itching or hives, swelling of the face, lips, or tongue  · breathing problems  · chest pain  · fast, irregular heartbeat  · feeling faint or lightheaded, falls  · fever, chills, or any other sign of infection  · muscle spasms, tightening, or twitches  · numbness or tingling  · skin blisters or bumps, or is dry, peels, or red  · slow healing or unexplained pain in the mouth or jaw  · unusual bleeding or bruising  Side effects that usually do not require medical attention (Report these to your doctor or health care professional if they continue or are bothersome.):  · muscle pain  · stomach upset, gas  What may interact with this medicine?  Do not take this medicine with any of the following medications:  · other medicines containing denosumab  This medicine may also  interact with the following medications:  · medicines that suppress the immune system  · medicines that treat cancer  · steroid medicines like prednisone or cortisone  What if I miss a dose?  It is important not to miss your dose. Call your doctor or health care professional if you are unable to keep an appointment.  Where should I keep my medicine?  This medicine is only given in a clinic, doctor's office, or other health care setting and will not be stored at home.  What should I tell my health care provider before I take this medicine?  They need to know if you have any of these conditions:  · dental disease  · eczema  · infection or history of infections  · kidney disease or on dialysis  · low blood calcium or vitamin D  · malabsorption syndrome  · scheduled to have surgery or tooth extraction  · taking medicine that contains denosumab  · thyroid or parathyroid disease  · an unusual reaction to denosumab, other medicines, foods, dyes, or preservatives  · pregnant or trying to get pregnant  · breast-feeding  What should I watch for while using this medicine?  Visit your doctor or health care professional for regular checks on your progress. Your doctor or health care professional may order blood tests and other tests to see how you are doing.  Call your doctor or health care professional if you get a cold or other infection while receiving this medicine. Do not treat yourself. This medicine may decrease your body's ability to fight infection.  You should make sure you get enough calcium and vitamin D while you are taking this medicine, unless your doctor tells you not to. Discuss the foods you eat and the vitamins you take with your health care professional.  See your dentist regularly. Brush and floss your teeth as directed. Before you have any dental work done, tell your dentist you are receiving this medicine.  Do not become pregnant while taking this medicine or for 5 months after stopping it. Women should  inform their doctor if they wish to become pregnant or think they might be pregnant. There is a potential for serious side effects to an unborn child. Talk to your health care professional or pharmacist for more information.  Date Last Reviewed:   NOTE:This sheet is a summary. It may not cover all possible information. If you have questions about this medicine, talk to your doctor, pharmacist, or health care provider. Copyright© 2016 [employer]

## 2017-05-09 ENCOUNTER — PATIENT MESSAGE (OUTPATIENT)
Dept: RHEUMATOLOGY | Facility: CLINIC | Age: 72
End: 2017-05-09

## 2017-05-09 ENCOUNTER — OFFICE VISIT (OUTPATIENT)
Dept: HEMATOLOGY/ONCOLOGY | Facility: CLINIC | Age: 72
End: 2017-05-09
Payer: MEDICARE

## 2017-05-09 VITALS
BODY MASS INDEX: 25.66 KG/M2 | SYSTOLIC BLOOD PRESSURE: 124 MMHG | HEART RATE: 94 BPM | TEMPERATURE: 99 F | OXYGEN SATURATION: 96 % | WEIGHT: 131.38 LBS | DIASTOLIC BLOOD PRESSURE: 74 MMHG

## 2017-05-09 DIAGNOSIS — N18.9 ANEMIA IN CKD (CHRONIC KIDNEY DISEASE): Primary | ICD-10-CM

## 2017-05-09 DIAGNOSIS — D63.1 ANEMIA IN CKD (CHRONIC KIDNEY DISEASE): Primary | ICD-10-CM

## 2017-05-09 DIAGNOSIS — D86.9 SARCOIDOSIS: Chronic | ICD-10-CM

## 2017-05-09 PROCEDURE — 1160F RVW MEDS BY RX/DR IN RCRD: CPT | Mod: S$GLB,,, | Performed by: INTERNAL MEDICINE

## 2017-05-09 PROCEDURE — 99499 UNLISTED E&M SERVICE: CPT | Mod: S$GLB,,, | Performed by: INTERNAL MEDICINE

## 2017-05-09 PROCEDURE — 1157F ADVNC CARE PLAN IN RCRD: CPT | Mod: S$GLB,,, | Performed by: INTERNAL MEDICINE

## 2017-05-09 PROCEDURE — 99999 PR PBB SHADOW E&M-EST. PATIENT-LVL IV: CPT | Mod: PBBFAC,,, | Performed by: INTERNAL MEDICINE

## 2017-05-09 PROCEDURE — 1126F AMNT PAIN NOTED NONE PRSNT: CPT | Mod: S$GLB,,, | Performed by: INTERNAL MEDICINE

## 2017-05-09 PROCEDURE — 3074F SYST BP LT 130 MM HG: CPT | Mod: S$GLB,,, | Performed by: INTERNAL MEDICINE

## 2017-05-09 PROCEDURE — 99213 OFFICE O/P EST LOW 20 MIN: CPT | Mod: S$GLB,,, | Performed by: INTERNAL MEDICINE

## 2017-05-09 PROCEDURE — 3078F DIAST BP <80 MM HG: CPT | Mod: S$GLB,,, | Performed by: INTERNAL MEDICINE

## 2017-05-09 PROCEDURE — 1159F MED LIST DOCD IN RCRD: CPT | Mod: S$GLB,,, | Performed by: INTERNAL MEDICINE

## 2017-05-09 NOTE — Clinical Note
Cont procrit q 2 wks Venofer x 1 ( orders completed -day of procrit)  Cbc q 2wks prior to procirt- standing Fe studies prior to f/u -standing

## 2017-05-09 NOTE — MR AVS SNAPSHOT
[facility]  [address] [place] [address]  Phone: [phone]                  Regino Lawrence   2017 9:30 AM   Office Visit    Description:  Female : 1945   Provider:  Edith Pacheco MD   [facility]           Reason for Visit     Follow-up           Diagnoses this Visit        Comments    Anemia in CKD (chronic kidney disease)    -  Primary     Sarcoidosis                To Do List           Future Appointments        Provider Department Dept Phone    2017 11:00 AM [facility], [facility] [phone]    2017 3:00 PM Benjie Lau MD [facility] - Nephrology [phone]    2017 10:00 AM CHAIR 07 [facility] [phone]    7/10/2017 1:00 PM Edith [facility] [phone]      Goals (5 Years of Data)              5/8/17    11/29/16    10/17/16    COMPLETED: HDL > 40   60        Related Problems    Combined 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    HEMOGLOBIN A1C < 7.0     5.6  5.6    Related Problems    Diabetes mellitus type 2, controlled    COMPLETED: LDL CHOLESTEROL < 70   90.2        Related Problems    Combined 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      Follow-Up and Disposition     Return in about 2 months (around 2017).      [facility]     [facility] -  Assistance  Unless otherwise directed by your provider, please contact [facility], our nurse care line that is available for  assistance.     Registered nurses in the [facility] provide: appointment scheduling, clinical advisement, health education, and other advisory services.  Call: [phone] (toll free)               Medications           Message regarding Medications     Verify the changes and/or additions to your medication regime listed below are the same as discussed with your clinician  today.  If any of these changes or additions are incorrect, please notify your healthcare provider.             Verify that the below list of medications is an accurate representation of the medications you are currently taking.  If none reported, the list may be blank. If incorrect, please contact your healthcare provider. Carry this list with you in case of emergency.           Current Medications     ACCU-CHEK FASTCLIX Kit USE AS DIRECTED.    ALCOHOL ANTISEPTIC PADS (ALCOHOL PREP PADS TOP)     blood sugar diagnostic Strp 1 each by Misc.(Non-Drug; Combo Route) route once daily.    blood-glucose meter kit Use as instructed    cetirizine (ZYRTEC) 10 MG tablet     cloNIDine (CATAPRES) 0.3 MG tablet Take 1 tablet (0.3 mg total) by mouth 3 (three) times daily.    cyclobenzaprine (FLEXERIL) 10 MG tablet Take 1 tablet (10 mg total) by mouth 3 (three) times daily.    fenofibrate 160 MG Tab TAKE 1 TABLET ONE TIME DAILY    lancing device (ACCU-CHEK SOFTCLIX LANCET DEV) Misc Accu-chek FastClix kit    levothyroxine (SYNTHROID) 50 MCG tablet TAKE 1 TABLET (50 MCG TOTAL) BY MOUTH BEFORE BREAKFAST.    lisinopril (PRINIVIL,ZESTRIL) 20 MG tablet TAKE 1 TABLET ONE TIME DAILY    LUBRICANT EYE DROPS, GLYC-PG, 1-0.3 % Drop     metformin (GLUCOPHAGE) 1000 MG tablet Take 1 tablet (1,000 mg total) by mouth 2 (two) times daily with meals.    METHYL SALICYLATE/MENTH/CAMPH (MUSCLE RUB, WITH CAMPHOR, TOP)     nifedipine (NIFEDICAL XL) 60 MG (OSM) 24 hr tablet Take 1 tablet (60 mg total) by mouth once daily.    ondansetron (ZOFRAN) 8 MG tablet Take 1 tablet (8 mg total) by mouth every 12 (twelve) hours as needed for Nausea.    potassium chloride SA (K-DUR,KLOR-CON) 20 MEQ tablet Take 1 tablet (20 mEq total) by mouth 2 (two) times daily.    predniSONE (DELTASONE) 10 MG tablet Take 1 tablet (10 mg total) by mouth once daily.    tramadol (ULTRAM) 50 mg tablet Take 1 tablet (50 mg total) by mouth every 8 (eight) hours as needed for Pain.     calcium carbonate (OS-MALCOLM) 500 mg calcium (1,250 mg) tablet Take 1 tablet (500 mg total) by mouth 2 (two) times daily.    diazePAM (VALIUM) 5 MG tablet Take 1 tablet (5 mg total) by mouth every 6 (six) hours as needed for Anxiety.    gabapentin (NEURONTIN) 400 MG capsule Take 1 capsule (400 mg total) by mouth every evening.           Clinical Reference Information           Your Vitals Were     BP Pulse Temp Weight Last Period SpO2    124/74 (BP Location: Right arm, Patient Position: Sitting, BP Method: Manual) 94 98.6 °F (37 °C) (Oral) 59.6 kg (131 lb 6.3 oz) (LMP Unknown) 96%    BMI                25.66 kg/m2          Blood Pressure          Most Recent Value    BP  124/74      Allergies as of 5/9/2017     Azathioprine      Immunizations Administered on Date of Encounter - 5/9/2017     None      Language Assistance Services     ATTENTION: Language assistance services are available, free of charge. Please call [phone].      ATENCIÓN: Si habla maria victoria, tiene a rivas disposición servicios gratuitos de asistencia lingüística. Llame al [phone].     [facility] Ý: N?u b?n nói Ti?ng Vi?t, có các d?ch v? h? tr? ngôn ng? mi?n phí dành cho b?n. G?i s? [phone].         [facility] complies with applicable Federal civil rights laws and does not discriminate on the basis of race, color, national origin, age, disability, or sex.

## 2017-05-09 NOTE — PROGRESS NOTES
Subjective:       Patient ID: Regino Lawrence is a 71 y.o. female.    Chief Complaint: Follow-up  Diagnosis: Anemia in CKD  Patient is a [employer]  .  HPI    The patient is seen today for chronic anemia in CKD. The patient reports that she has been diagnosed with JOLLY in the past.  She has been on oral iron supplementation therapy, but could not tolerate or did not respond, she is uncertain.  She is followed by GI and has undergone a colonoscopy earlier this year and was diagnosed with hemorrhoids for which she underwent banding procedure.  No melena, hematochezia,change in bowel habits.  She has also been diagnosed with B12 deficiency in the past, but reports she did not respond to B12 injections. No history of blood transfusions.  She is a [employer].  She reports that she remembers getting injections in the 1970s when her blood count was low.        She is followed by Rheumatology for history of sarcoidosis with associated myopathy and arthropathy.   .She has been treated in the  past with methotrexate and Plaquenil, both of which were ineffective    Today, she reports  swelling in  hands and pain all over.   She continues with chronic LBP- stable.   Pain improved  with tramadol.  She remains on Pred 10 mg daily  She recently underwent Kenalog IM    She continues to undergo Procrit therapy q 2wks  She undergoes intermittent IV iron therapy  No fatigue  No SOB/CP/NV       CBC reveals wbc 7050/mm3  Hb 10.7 g/dl Hct 33.2 % Plt ct 331k    PAST MEDICAL HISTORY:  Acid reflux, alopecia, anemia, anxiety disorder, chronic  kidney disease, depression, diabetes mellitus type 2, hyperlipidemia,  hypertension, hypothyroidism, osteoporosis, sarcoidosis.    PAST SURGICAL HISTORY:  Cholecystectomy, , tubal ligation, carpal tunnel release, cataracts.    FAMILY HISTORY: Unremarkable for cancer. Significant for HTN.       Review of Systems   Constitutional: Negative for activity change, appetite change,  chills, diaphoresis and fatigue.   HENT: Negative for hearing loss, nosebleeds and rhinorrhea.    Eyes: Negative for visual disturbance.   Respiratory: Negative for cough, chest tightness and shortness of breath.    Cardiovascular: Negative for chest pain and leg swelling.   Gastrointestinal: Negative for abdominal pain, blood in stool, constipation, diarrhea and nausea.   Genitourinary: Negative for flank pain and urgency.   Musculoskeletal: Positive for arthralgias, back pain and joint swelling. Negative for gait problem and neck pain.   Skin: Negative for rash.        No petechiae, ecchymoses   Neurological: Negative for light-headedness and headaches.   Hematological: Negative for adenopathy. Does not bruise/bleed easily.       Objective:       Vitals:    05/09/17 0934   BP: 124/74   BP Location: Right arm   Patient Position: Sitting   BP Method: Manual   Pulse: 94   Temp: 98.6 °F (37 °C)   TempSrc: Oral   SpO2: 96%   Weight: 59.6 kg (131 lb 6.3 oz)       Physical Exam   Constitutional: She is oriented to person, place, and time. She appears well-developed and well-nourished.   HENT:   Head: Normocephalic.   Mouth/Throat: Oropharynx is clear and moist. No oropharyngeal exudate.   Eyes: Conjunctivae and lids are normal. Pupils are equal, round, and reactive to light. No scleral icterus.   Neck: Normal range of motion. Neck supple. No thyromegaly present.   Cardiovascular: Normal rate, regular rhythm and normal heart sounds.    No murmur heard.  Pulmonary/Chest: Breath sounds normal. She has no wheezes. She has no rales.   Abdominal: Soft. Bowel sounds are normal. She exhibits no distension and no mass. There is no hepatosplenomegaly. There is no tenderness. There is no rebound and no guarding.   Musculoskeletal: Normal range of motion. She exhibits no edema or tenderness.   Lymphadenopathy:     She has no cervical adenopathy.     She has no axillary adenopathy.        Right: No supraclavicular adenopathy present.         Left: No supraclavicular adenopathy present.   Neurological: She is alert and oriented to person, place, and time. No cranial nerve deficit. Coordination normal.   Skin: Skin is warm and dry. No ecchymosis, no petechiae and no rash noted. No erythema.   Psychiatric: She has a normal mood and affect.         Results for CANDELARIA SANTANA (MRN [id number]) as of 8/28/2016 14:24   Ref. Range 10/14/2015 08:18 12/8/2015 11:33 3/7/2016 12:08   Vitamin B-12 Latest Ref Range: 210 - 950 pg/mL [phone]       Results for CANDELARIA SANTANA (MRN [id number]) as of 9/22/2016 10:20   Ref. Range 8/17/2016 14:30   Retic Latest Ref Range: 0.5 - 2.5 % 2.8 (H)   Sed Rate Latest Ref Range: 0 - 20 mm/Hr 31 (H)     Lab Results   Component Value Date    WBC 7.05 05/08/2017    HGB 10.7 (L) 05/08/2017    HCT 33.2 (L) 05/08/2017    MCV 97 05/08/2017     05/08/2017     Lab Results   Component Value Date    IRON 63 05/08/2017    TIBC 426 05/08/2017    FERRITIN 73 05/08/2017     SPEP-nl          CT renal 3/6/2017   IMPRESSION:  1.  No renal, ureteral or bladder calculi.  No hydronephrosis or ureterectasis.  2.  Poorly distended bladder.  Mild bladder wall prominence.  Mild cystitis cannot be   excluded.  3.  Moderate constipation.  Normal appendix      Lab Results   Component Value Date    IRON 63 05/08/2017    TIBC 426 05/08/2017    FERRITIN 73 05/08/2017       Assessment:       1. Anemia in CKD (chronic kidney disease)    2. Sarcoidosis        Plan:   1- 2. Pt clinically stable  Hb 10.7 g/dl   Ferritin 73  Pt is a Zoroastrianism and declines/not interested in blood and blood products due to Presybeterian beliefs  Cont  Procrit therapy  q 2wks ( pending lab parameters)  F/u with Rheumatology  CBC q 2wks  F/u 2 mos with Fe studies        CC: Micaela Mendoza M.D.

## 2017-05-12 ENCOUNTER — OFFICE VISIT (OUTPATIENT)
Dept: FAMILY MEDICINE | Facility: CLINIC | Age: 72
End: 2017-05-12
Payer: MEDICARE

## 2017-05-12 VITALS
DIASTOLIC BLOOD PRESSURE: 80 MMHG | BODY MASS INDEX: 25.79 KG/M2 | OXYGEN SATURATION: 97 % | HEIGHT: 60 IN | HEART RATE: 90 BPM | TEMPERATURE: 98 F | WEIGHT: 131.38 LBS | SYSTOLIC BLOOD PRESSURE: 140 MMHG

## 2017-05-12 DIAGNOSIS — Z74.09 IMPAIRED MOBILITY: ICD-10-CM

## 2017-05-12 DIAGNOSIS — E78.2 COMBINED HYPERLIPIDEMIA ASSOCIATED WITH TYPE 2 DIABETES MELLITUS: Chronic | ICD-10-CM

## 2017-05-12 DIAGNOSIS — I10 ESSENTIAL HYPERTENSION: Chronic | ICD-10-CM

## 2017-05-12 DIAGNOSIS — E03.9 HYPOTHYROIDISM, UNSPECIFIED TYPE: Chronic | ICD-10-CM

## 2017-05-12 DIAGNOSIS — E11.69 COMBINED HYPERLIPIDEMIA ASSOCIATED WITH TYPE 2 DIABETES MELLITUS: Chronic | ICD-10-CM

## 2017-05-12 DIAGNOSIS — E11.22 DIABETES MELLITUS WITH STAGE 3 CHRONIC KIDNEY DISEASE: Chronic | ICD-10-CM

## 2017-05-12 DIAGNOSIS — Z00.00 ENCOUNTER FOR PREVENTIVE HEALTH EXAMINATION: Primary | ICD-10-CM

## 2017-05-12 DIAGNOSIS — E11.8 CONTROLLED TYPE 2 DIABETES MELLITUS WITH COMPLICATION, WITHOUT LONG-TERM CURRENT USE OF INSULIN: ICD-10-CM

## 2017-05-12 DIAGNOSIS — R53.81 DEBILITY: ICD-10-CM

## 2017-05-12 DIAGNOSIS — D86.9 SARCOIDOSIS: Chronic | ICD-10-CM

## 2017-05-12 DIAGNOSIS — I70.0 CALCIFICATION OF AORTA: ICD-10-CM

## 2017-05-12 DIAGNOSIS — D84.9 IMMUNOSUPPRESSION: Chronic | ICD-10-CM

## 2017-05-12 DIAGNOSIS — E11.9 DM TYPE 2 WITHOUT RETINOPATHY: ICD-10-CM

## 2017-05-12 DIAGNOSIS — N18.30 DIABETES MELLITUS WITH STAGE 3 CHRONIC KIDNEY DISEASE: Chronic | ICD-10-CM

## 2017-05-12 DIAGNOSIS — M81.0 OSTEOPOROSIS, UNSPECIFIED OSTEOPOROSIS TYPE, UNSPECIFIED PATHOLOGICAL FRACTURE PRESENCE: Chronic | ICD-10-CM

## 2017-05-12 PROCEDURE — 3077F SYST BP >= 140 MM HG: CPT | Mod: S$GLB,,, | Performed by: NURSE PRACTITIONER

## 2017-05-12 PROCEDURE — G0439 PPPS, SUBSEQ VISIT: HCPCS | Mod: S$GLB,,, | Performed by: NURSE PRACTITIONER

## 2017-05-12 PROCEDURE — 3079F DIAST BP 80-89 MM HG: CPT | Mod: S$GLB,,, | Performed by: NURSE PRACTITIONER

## 2017-05-12 PROCEDURE — 99999 PR PBB SHADOW E&M-EST. PATIENT-LVL V: CPT | Mod: PBBFAC,,, | Performed by: NURSE PRACTITIONER

## 2017-05-12 PROCEDURE — 99499 UNLISTED E&M SERVICE: CPT | Mod: S$GLB,,, | Performed by: NURSE PRACTITIONER

## 2017-05-12 NOTE — MR AVS SNAPSHOT
[facility]  [address] [place] [address]  Phone: [phone]  Fax: [phone]                  Regino Lawrence   2017 11:00 AM   Office Visit    Description:  Female : 1945   Provider:  [facility], [facility]   Department:  [facility]           Reason for Visit     Health Risk Assessment           Diagnoses this Visit        Comments    Encounter for preventive health examination    -  Primary            To Do List           Future Appointments        Provider Department Dept Phone    2017 3:00 PM Benjie Lau MD [facility] Nephrology [phone]    2017 1:00 PM Micaela Mendoza MD [facility] Family Medicine [phone]    2017 10:00 AM CHAIR 07 [facility] [phone]    2017 10:05 AM [facility] [phone]    7/10/2017 1:00 PM Edith Pacheco MD [facility] Oncology [phone]      Goals (5 Years of Data)              5/8/17    11/29/16    10/17/16    COMPLETED: HDL > 40   60        Related Problems    Combined 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    HEMOGLOBIN A1C < 7.0     5.6  5.6    Related Problems    Diabetes mellitus type 2, controlled    COMPLETED: LDL CHOLESTEROL < 70   90.2        Related Problems    Combined 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      [facility]     [facility] -  Assistance  Unless otherwise directed by your provider, please contact [facility], our nurse care line that is available for  assistance.     Registered nurses in the [facility] provide: appointment scheduling, clinical advisement, health education, and other advisory services.  Call: [phone] (toll free)               Medications           Message regarding Medications     Verify the changes and/or additions to your medication regime listed below are the same as discussed with your  clinician today.  If any of these changes or additions are incorrect, please notify your healthcare provider.             Verify that the below list of medications is an accurate representation of the medications you are currently taking.  If none reported, the list may be blank. If incorrect, please contact your healthcare provider. Carry this list with you in case of emergency.           Current Medications     cetirizine (ZYRTEC) 10 MG tablet     cloNIDine (CATAPRES) 0.3 MG tablet Take 1 tablet (0.3 mg total) by mouth 3 (three) times daily.    cyclobenzaprine (FLEXERIL) 10 MG tablet Take 1 tablet (10 mg total) by mouth 3 (three) times daily.    fenofibrate 160 MG Tab TAKE 1 TABLET ONE TIME DAILY    lancing device (ACCU-CHEK SOFTCLIX LANCET DEV) Misc Accu-chek FastClix kit    levothyroxine (SYNTHROID) 50 MCG tablet TAKE 1 TABLET (50 MCG TOTAL) BY MOUTH BEFORE BREAKFAST.    lisinopril (PRINIVIL,ZESTRIL) 20 MG tablet TAKE 1 TABLET ONE TIME DAILY    LUBRICANT EYE DROPS, GLYC-PG, 1-0.3 % Drop     metformin (GLUCOPHAGE) 1000 MG tablet Take 1 tablet (1,000 mg total) by mouth 2 (two) times daily with meals.    METHYL SALICYLATE/MENTH/CAMPH (MUSCLE RUB, WITH CAMPHOR, TOP)     nifedipine (NIFEDICAL XL) 60 MG (OSM) 24 hr tablet Take 1 tablet (60 mg total) by mouth once daily.    ondansetron (ZOFRAN) 8 MG tablet Take 1 tablet (8 mg total) by mouth every 12 (twelve) hours as needed for Nausea.    potassium chloride SA (K-DUR,KLOR-CON) 20 MEQ tablet Take 1 tablet (20 mEq total) by mouth 2 (two) times daily.    predniSONE (DELTASONE) 10 MG tablet Take 1 tablet (10 mg total) by mouth once daily.    tramadol (ULTRAM) 50 mg tablet Take 1 tablet (50 mg total) by mouth every 8 (eight) hours as needed for Pain.    ACCU-CHEK FASTCLIX Kit USE AS DIRECTED.    ALCOHOL ANTISEPTIC PADS (ALCOHOL PREP PADS TOP)     blood sugar diagnostic Strp 1 each by Misc.(Non-Drug; Combo Route) route once daily.    blood-glucose meter kit Use as  instructed    calcium carbonate (OS-MALCOLM) 500 mg calcium (1,250 mg) tablet Take 1 tablet (500 mg total) by mouth 2 (two) times daily.    diazePAM (VALIUM) 5 MG tablet Take 1 tablet (5 mg total) by mouth every 6 (six) hours as needed for Anxiety.    gabapentin (NEURONTIN) 400 MG capsule Take 1 capsule (400 mg total) by mouth every evening.           Clinical Reference Information           Your Vitals Were     BP Pulse Temp Height Weight Last Period    140/80 (BP Location: Left arm, Patient Position: Sitting, BP Method: Manual) 90 98 °F (36.7 °C) (Oral) 5' (1.524 m) 59.6 kg (131 lb 6.3 oz) (LMP Unknown)    SpO2 BMI             97% 25.66 kg/m2         Blood Pressure          Most Recent Value    BP  (!)  140/80      Allergies as of 5/12/2017     Azathioprine      Immunizations Administered on Date of Encounter - 5/12/2017     None      Instructions      Back Care Tips    Caring for your back  These are things you can do to prevent a recurrence of acute back pain and to reduce symptoms from chronic back pain:  · Maintain a healthy weight. If you are overweight, losing weight will help most types of back pain.  · Exercise is an important part of recovery from most types of back pain. The muscles behind and in front of the spine support the back. This means strengthening both the back muscles and the abdominal muscles will provide better support for your spine.   · Swimming and brisk walking are good overall exercises to improve your fitness level.  · Practice safe lifting methods (below).  · Practice good posture when sitting, standing and walking. Avoid prolonged sitting. This puts more stress on the lower back than standing or walking.  · Wear quality shoes with sufficient arch support. Foot and ankle alignment can affect back symptoms. Women should avoid wearing high heels.  · Therapeutic massage can help relax the back muscles without stretching them.  · During the first 24 to 72 hours after an acute injury or  flare-up of chronic back pain, apply an ice pack to the painful area for 20 minutes and then remove it for 20 minutes, over a period of 60 to 90 minutes, or several times a day. As a safety precaution, do not use a heating pad at bedtime. Sleeping on a heating pad can lead to skin burns or tissue damage.  · You can alternate ice and heat therapies.  Medications  Talk to your healthcare provider before using medicines, especially if you have other medical problems or are taking other medicines.  · You may use acetaminophen or ibuprofen to control pain, unless your healthcare provider prescribed other pain medicine. If you have chronic conditions like diabetes, liver or kidney disease, stomach ulcers, or gastrointestinal bleeding, or are taking blood thinners, talk with your healthcare provider before taking any medicines.  · Be careful if you are given prescription pain medicines, narcotics, or medicine for muscle spasm. They can cause drowsiness, affect your coordination, reflexes, and judgment. Do not drive or operate heavy machinery while taking these types of medicines. Take prescription pain medicine only as prescribed by your healthcare provider.  Lumbar stretch  Here is a simple stretching exercise that will help relax muscle spasm and keep your back more limber. If exercise makes your back pain worse, dont do it.  · Lie on your back with your knees bent and both feet on the ground.  · Slowly raise your left knee to your chest as you flatten your lower back against the floor. Hold for 5 seconds.  · Relax and repeat the exercise with your right knee.  · Do 10 of these exercises for each leg.  Safe lifting method  · Dont bend over at the waist to lift an object off the floor.  Instead, bend your knees and hips in a squat.   · Keep your back and head upright  · Hold the object close to your body, directly in front of you.  · Straighten your legs to lift the object.   · Lower the object to the floor in the  reverse fashion.  · If you must slide something across the floor, push it.  Posture tips  Sitting  Sit in chairs with straight backs or low-back support. Keep your knees lower than your hips, with your feet flat on the floor.  When driving, sit up straight. Adjust the seat forward so you are not leaning toward the steering wheel.  A small pillow or rolled towel behind your lower back may help if you are driving long distances.   Standing  When standing for long periods, shift most of your weight to one leg at a time. Alternate legs every few minutes.   Sleeping  The best way to sleep is on your side with your knees bent. Put a low pillow under your head to support your neck in a neutral spine position. Avoid thick pillows that bend your neck to one side. Put a pillow between your legs to further relax your lower back. If you sleep on your back, put pillows under your knees to support your legs in a slightly flexed position. Use a firm mattress. If your mattress sags, replace it, or use a 1/2-inch plywood board under the mattress to add support.  Follow-up care  Follow up with your healthcare provider, or as advised.  If X-rays, a CT scan or an MRI scan were taken, they will be reviewed by a radiologist. You will be notified of any new findings that may affect your care.  Call 911  Seek emergency medical care if any of the following occur:  · Trouble breathing  · Confusion  · Very drowsy  · Fainting or loss of consciousness  · Rapid or very slow heart rate  · Loss of  bowel or bladder control  When to seek medical care  Call your healthcare provider if any of the following occur:  · Pain becomes worse or spreads to your arms or legs  · Weakness or numbness in one or both arms or legs  · Numbness in the groin area  Date Last Reviewed: 6/1/2016  © 6629-7409 [employer]. [address] [place] [address], [place] [address]. All rights reserved. This information is not intended as a substitute for professional medical  care. Always follow your healthcare professional's instructions.        How Your Back Works  A healthy back allows you to bend and stretch without pain. The spine has three natural curves, which keep your body balanced. Strong, flexible muscles support your spine. Soft, cushioning disks separate the hard bones of your spine, allowing it to bend and move.    The parts of the spine  · The vertebrae are the 24 bones that make up the spine.  · The spinous process is the part of each vertebra you can feel through your skin.  · Each of these bones has a canal that runs top to bottom. Together these canals form a tunnel called the spinal canal.  · The lamina of each vertebra forms the back of the spinal canal.  · Running through the canal are nerves.  · A foramen is a small opening where a nerve leaves the spinal canal.  · Disks serve as cushions between vertebrae. A disks soft center absorbs shock during movement.     Two vertebrae and a disk     The supporting muscles  Strong, flexible muscles help maintain your three natural curves. They hold your spine in proper alignment. This helps support your upper body. Strong core muscular including the stomach, buttock, and thigh muscles help take the strain off your back.  Date Last Reviewed: 8/31/2015  © 9075-0091 [employer]. [address] [place] [address]. All rights reserved. This information is not intended as a substitute for professional medical care. Always follow your healthcare professional's instructions.             Language Assistance Services     ATTENTION: Language assistance services are available, free of charge. Please call [phone].      ATENCIÓN: Si habla español, tiene a rivas disposición servicios gratuitos de asistencia lingüística. Llame al [phone].     [facility] Ý: N?u b?n nói Ti?ng Vi?t, có các d?ch v? h? tr? ngôn ng? mi?n phí dành cho b?n. G?i s? [phone].         [facility] complies with applicable  Federal civil rights laws and does not discriminate on the basis of race, color, national origin, age, disability, or sex.

## 2017-05-12 NOTE — PATIENT INSTRUCTIONS
Back Care Tips    Caring for your back  These are things you can do to prevent a recurrence of acute back pain and to reduce symptoms from chronic back pain:  · Maintain a healthy weight. If you are overweight, losing weight will help most types of back pain.  · Exercise is an important part of recovery from most types of back pain. The muscles behind and in front of the spine support the back. This means strengthening both the back muscles and the abdominal muscles will provide better support for your spine.   · Swimming and brisk walking are good overall exercises to improve your fitness level.  · Practice safe lifting methods (below).  · Practice good posture when sitting, standing and walking. Avoid prolonged sitting. This puts more stress on the lower back than standing or walking.  · Wear quality shoes with sufficient arch support. Foot and ankle alignment can affect back symptoms. Women should avoid wearing high heels.  · Therapeutic massage can help relax the back muscles without stretching them.  · During the first 24 to 72 hours after an acute injury or flare-up of chronic back pain, apply an ice pack to the painful area for 20 minutes and then remove it for 20 minutes, over a period of 60 to 90 minutes, or several times a day. As a safety precaution, do not use a heating pad at bedtime. Sleeping on a heating pad can lead to skin burns or tissue damage.  · You can alternate ice and heat therapies.  Medications  Talk to your healthcare provider before using medicines, especially if you have other medical problems or are taking other medicines.  · You may use acetaminophen or ibuprofen to control pain, unless your healthcare provider prescribed other pain medicine. If you have chronic conditions like diabetes, liver or kidney disease, stomach ulcers, or gastrointestinal bleeding, or are taking blood thinners, talk with your healthcare provider before taking any medicines.  · Be careful if you are given  prescription pain medicines, narcotics, or medicine for muscle spasm. They can cause drowsiness, affect your coordination, reflexes, and judgment. Do not drive or operate heavy machinery while taking these types of medicines. Take prescription pain medicine only as prescribed by your healthcare provider.  Lumbar stretch  Here is a simple stretching exercise that will help relax muscle spasm and keep your back more limber. If exercise makes your back pain worse, dont do it.  · Lie on your back with your knees bent and both feet on the ground.  · Slowly raise your left knee to your chest as you flatten your lower back against the floor. Hold for 5 seconds.  · Relax and repeat the exercise with your right knee.  · Do 10 of these exercises for each leg.  Safe lifting method  · Dont bend over at the waist to lift an object off the floor.  Instead, bend your knees and hips in a squat.   · Keep your back and head upright  · Hold the object close to your body, directly in front of you.  · Straighten your legs to lift the object.   · Lower the object to the floor in the reverse fashion.  · If you must slide something across the floor, push it.  Posture tips  Sitting  Sit in chairs with straight backs or low-back support. Keep your knees lower than your hips, with your feet flat on the floor.  When driving, sit up straight. Adjust the seat forward so you are not leaning toward the steering wheel.  A small pillow or rolled towel behind your lower back may help if you are driving long distances.   Standing  When standing for long periods, shift most of your weight to one leg at a time. Alternate legs every few minutes.   Sleeping  The best way to sleep is on your side with your knees bent. Put a low pillow under your head to support your neck in a neutral spine position. Avoid thick pillows that bend your neck to one side. Put a pillow between your legs to further relax your lower back. If you sleep on your back, put pillows  under your knees to support your legs in a slightly flexed position. Use a firm mattress. If your mattress sags, replace it, or use a 1/2-inch plywood board under the mattress to add support.  Follow-up care  Follow up with your healthcare provider, or as advised.  If X-rays, a CT scan or an MRI scan were taken, they will be reviewed by a radiologist. You will be notified of any new findings that may affect your care.  Call 911  Seek emergency medical care if any of the following occur:  · Trouble breathing  · Confusion  · Very drowsy  · Fainting or loss of consciousness  · Rapid or very slow heart rate  · Loss of  bowel or bladder control  When to seek medical care  Call your healthcare provider if any of the following occur:  · Pain becomes worse or spreads to your arms or legs  · Weakness or numbness in one or both arms or legs  · Numbness in the groin area  Date Last Reviewed: 6/1/2016  © 8589-3621 [employer]. [address] [place] [address]. All rights reserved. This information is not intended as a substitute for professional medical care. Always follow your healthcare professional's instructions.        How Your Back Works  A healthy back allows you to bend and stretch without pain. The spine has three natural curves, which keep your body balanced. Strong, flexible muscles support your spine. Soft, cushioning disks separate the hard bones of your spine, allowing it to bend and move.    The parts of the spine  · The vertebrae are the 24 bones that make up the spine.  · The spinous process is the part of each vertebra you can feel through your skin.  · Each of these bones has a canal that runs top to bottom. Together these canals form a tunnel called the spinal canal.  · The lamina of each vertebra forms the back of the spinal canal.  · Running through the canal are nerves.  · A foramen is a small opening where a nerve leaves the spinal canal.  · Disks serve as cushions between  vertebrae. A disks soft center absorbs shock during movement.     Two vertebrae and a disk     The supporting muscles  Strong, flexible muscles help maintain your three natural curves. They hold your spine in proper alignment. This helps support your upper body. Strong core muscular including the stomach, buttock, and thigh muscles help take the strain off your back.  Date Last Reviewed: 8/31/2015  © 3929-3031 [employer]. [address] [place] [address]. All rights reserved. This information is not intended as a substitute for professional medical care. Always follow your healthcare professional's instructions.        Counseling and Referral of Other Preventative  (Italic type indicates deductible and co-insurance are waived)    Patient Name: Regino Lawrence  Today's Date: 5/12/2017      SERVICE LIMITATIONS RECOMMENDATION    Vaccines    · Pneumococcal (once after 65)    · Influenza (annually)    · Hepatitis B (if medium/high risk)    · Prevnar 13      Hepatitis B medium/high risk factors:       - End-stage renal disease       - Hemophiliacs who received Factor VII or         IX concentrates       - Clients of institutions for the mentally             retarded       - Persons who live in the same house as          a HepB carrier       - Homosexual men       - Illicit injectable drug abusers     Pneumococcal: Done, no repeat necessary     Influenza: Done, repeat in one year     Hepatitis B: N/A     Prevnar 13: Done, no repeat necessary    Mammogram (biennial age 50-74)  Annually (age 40 or over)  Done this year, repeat every year    Pap (up to age 70 and after 70 if unknown history or abnormal study last 10 years)    N/A     no clinical risk factors     Colorectal cancer screening (to age 75)    · Fecal occult blood test (annual)  · Flexible sigmoidoscopy (5y)  · Screening colonoscopy (10y)  · Barium enema   Last done 2/2015, recommend to repeat every 10  years    Diabetes self-management  training (no USPSTF recommendations)  Requires referral by treating physician for patient with diabetes or renal disease. 10 hours of initial DSMT sessions of no less than 30 minutes each in a continuous 12-month period. 2 hours of follow-up DSMT in subsequent years.  discussed with patient      Bone mass measurements (age 65 & older, biennial)  Requires diagnosis related to osteoporosis or estrogen deficiency. Biennial benefit unless patient has history of long-term glucocorticoid  Done this year, repeat every year    Glaucoma screening (no USPSTF recommendation)  Diabetes mellitus, family history   , age 50 or over    American, age 65 or over  Done this year, repeat every year    Medical nutrition therapy for diabetes or renal disease (no recommended schedule)  Requires referral by treating physician for patient with diabetes or renal disease or kidney transplant within the past 3 years.  Can be provided in same year as diabetes self-management training (DSMT), and CMS recommends medical nutrition therapy take place after DSMT. Up to 3 hours for initial year and 2 hours in subsequent years.  Done this year, repeat every year    Cardiovascular screening blood tests (every 5 years)  · Fasting lipid panel  Order as a panel if possible  completed May 2017, repeat 1 year     Diabetes screening tests (at least every 3 years, Medicare covers annually or at 6-month intervals for prediabetic patients)  · Fasting blood sugar (FBS) or glucose tolerance test (GTT)  Patient must be diagnosed with one of the following:       - Hypertension       - Dyslipidemia       - Obesity (BMI 30kg/m2)       - Previous elevated impaired FBS or GTT       ... or any two of the following:       - Overweight (BMI 25 but <30)       - Family history of diabetes       - Age 65 or older       - History of gestational diabetes or birth of baby weighing more than 9 pounds  Last done 11/2016, recommend to repeat every 6  months     Abdominal aortic aneurysm screening (once)  · Sonogram   Limited to patients who meet one of the following criteria:       - Men who are 65-75 years old and have smoked more than 100 cigarette in their lifetime       - Anyone with a family history of abdominal aortic aneurysm       - Anyone recommended for screening by the USPSTF  N/A    HIV screening (annually for increased risk patients)  · HIV-1 and HIV-2 by EIA, or CHELSY, rapid antibody test or oral mucosa transudate  Patients must be at increased risk for HIV infection per USPSTF guidelines or pregnant. Tests covered annually for patient at increased risk or as requested by the patient. Pregnant patients may receive up to 3 tests during pregnancy.  no clinical risk factors      Smoking cessation counseling (up to 8 sessions per year)  Patients must be asymptomatic of tobacco-related conditions to receive as a preventative service.  Non-smoker    Subsequent annual wellness visit  At least 12 months since last AWV  Return in one year     The following information is provided to all patients.  This information is to help you find resources for any of the problems found today that may be affecting your health:                Living healthy guide: www.[facility]      Understanding Diabetes: www.diabetes.org      Eating healthy: www.cdc.gov/healthyweight      [facility] home safety checklist: www.cdc.gov/steadi/patient.html      Agency on Aging: www.goea.[place].gov      Alcoholics anonymous (AA): www.aa.org      Physical Activity: www.himanshu.nih.gov/py8mrnm      Tobacco use: www.quitwithusla.org

## 2017-05-13 NOTE — PROGRESS NOTES
Regino Lawrence presented for a  Medicare AWV and comprehensive Health Risk Assessment today. The following components were reviewed and updated:    · Medical history  · Family History  · Social history  · Allergies and Current Medications  · Health Risk Assessment  · Health Maintenance  · Care Team     ** See Completed Assessments for Annual Wellness Visit within the encounter summary.**       The following assessments were completed:  · Living Situation  · CAGE  · Depression Screening  · Timed Get Up and Go  · Whisper Test  · Cognitive Function Screening  · Nutrition Screening  · ADL Screening  · PAQ Screening    Vitals:    05/12/17 1118   BP: (!) 140/80   BP Location: Left arm   Patient Position: Sitting   BP Method: Manual   Pulse: 90   Temp: 98 °F (36.7 °C)   TempSrc: Oral   SpO2: 97%   Weight: 59.6 kg (131 lb 6.3 oz)   Height: 5' (1.524 m)     Body mass index is 25.66 kg/(m^2).  Physical Exam   Constitutional: She is oriented to person, place, and time.   Cardiovascular: Normal rate, regular rhythm and normal heart sounds.    Pulses:       Dorsalis pedis pulses are 2+ on the right side, and 2+ on the left side.        Posterior tibial pulses are 2+ on the right side, and 2+ on the left side.   Pulmonary/Chest: Effort normal and breath sounds normal.   Musculoskeletal: Normal range of motion. She exhibits no edema.        Right foot: There is normal range of motion and no deformity.        Left foot: There is normal range of motion and no deformity.   Feet:   Right Foot:   Protective Sensation: 6 sites tested. 6 sites sensed.   Skin Integrity: Negative for ulcer, blister, skin breakdown, erythema, warmth, callus or dry skin.   Left Foot:   Protective Sensation: 6 sites tested. 6 sites sensed.   Skin Integrity: Negative for ulcer, blister, skin breakdown, erythema, warmth, callus or dry skin.   Neurological: She is alert and oriented to person, place, and time.   Skin: Skin is warm.   Psychiatric: She has a normal  mood and affect. Her behavior is normal. Thought content normal.   Vitals reviewed.        Diagnoses and health risks identified today and associated recommendations/orders:    1. Encounter for preventive health examination  Education provided about preventive health examinations and procedures; addressed and discussed patient's health concerns. Additionally, reviewed medical record for risk factors and documented the results during this encounter.    2. Controlled type 2 diabetes mellitus with complication, without long-term current use of insulin  Education provided about diabetes, management of blood glucose with diet and activities, monitoring for worsening effects of diabetes.  Reviewed most recent Ha1c (5.6- November 2016), complications associated with uncontrolled diabetes.     3. Combined 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  Education provided about diabetes, management of blood glucose with diet and activities, monitoring for worsening effects of diabetes.  Reviewed most recent Ha1c (5.6- November 2016), complications associated with uncontrolled diabetes. Lipid panel reflects within normal range.     4. Diabetes mellitus with stage 3 chronic kidney disease  Education provided about diabetes, management of blood glucose with diet and activities, monitoring for worsening effects of diabetes.  Reviewed most recent Ha1c (5.6- November 2016), complications associated with uncontrolled diabetes.  She has a scheduled consultation with [facility] nephrology dept.     5. DM type 2 without retinopathy  Education provided about diabetes, management of blood glucose with diet and activities, monitoring for worsening effects of diabetes.  Reviewed most recent Ha1c (5.6- November 2016), complications associated with uncontrolled diabetes.  Patient is followed by [facility] ophthalmology and optometry.     6. Immunosuppression  Stable, followed by hematology/oncology.     7. Calcification of aorta  Stable,  asymptomatic; monitor.     8. Impaired mobility  Stable, patient using cane for long distance, reports intermittently using walker.     9. Debility  Stable, patient using cane for long distance, reports intermittently using walker.     10. Sarcoidosis  Stable, followed by hematology/oncology.     11. Essential hypertension  We discussed today's reading, medication administration, compliance, and frequency.     12. Hypothyroidism, unspecified type  Clinically euthyroid. Continue as advised.     13. Osteoporosis, unspecified osteoporosis type, unspecified pathological fracture presence  Stable, followed by [facility] rheumatology dept. Continue as advised.       Provided Cyndyha with a 5-10 year written screening schedule and personal prevention plan. Recommendations were developed using the USPSTF age appropriate recommendations. Education, counseling, and referrals were provided as needed. After Visit Summary printed and given to patient which includes a list of additional screenings\tests needed.    No Follow-up on file.    Keshawn Everett Jr, NP

## 2017-05-16 ENCOUNTER — OFFICE VISIT (OUTPATIENT)
Dept: NEPHROLOGY | Facility: CLINIC | Age: 72
End: 2017-05-16
Payer: MEDICARE

## 2017-05-16 VITALS
OXYGEN SATURATION: 96 % | HEART RATE: 102 BPM | BODY MASS INDEX: 25.76 KG/M2 | DIASTOLIC BLOOD PRESSURE: 70 MMHG | HEIGHT: 60 IN | WEIGHT: 131.19 LBS | SYSTOLIC BLOOD PRESSURE: 130 MMHG

## 2017-05-16 DIAGNOSIS — E11.22 CONTROLLED TYPE 2 DIABETES MELLITUS WITH STAGE 3 CHRONIC KIDNEY DISEASE, WITHOUT LONG-TERM CURRENT USE OF INSULIN: Primary | ICD-10-CM

## 2017-05-16 DIAGNOSIS — N18.30 CHRONIC KIDNEY DISEASE, STAGE III (MODERATE): ICD-10-CM

## 2017-05-16 DIAGNOSIS — N18.30 CONTROLLED TYPE 2 DIABETES MELLITUS WITH STAGE 3 CHRONIC KIDNEY DISEASE, WITHOUT LONG-TERM CURRENT USE OF INSULIN: Primary | ICD-10-CM

## 2017-05-16 PROCEDURE — 1160F RVW MEDS BY RX/DR IN RCRD: CPT | Mod: S$GLB,,, | Performed by: INTERNAL MEDICINE

## 2017-05-16 PROCEDURE — 3075F SYST BP GE 130 - 139MM HG: CPT | Mod: S$GLB,,, | Performed by: INTERNAL MEDICINE

## 2017-05-16 PROCEDURE — 99499 UNLISTED E&M SERVICE: CPT | Mod: S$GLB,,, | Performed by: INTERNAL MEDICINE

## 2017-05-16 PROCEDURE — 99203 OFFICE O/P NEW LOW 30 MIN: CPT | Mod: S$GLB,,, | Performed by: INTERNAL MEDICINE

## 2017-05-16 PROCEDURE — 3078F DIAST BP <80 MM HG: CPT | Mod: S$GLB,,, | Performed by: INTERNAL MEDICINE

## 2017-05-16 PROCEDURE — 3066F NEPHROPATHY DOC TX: CPT | Mod: S$GLB,,, | Performed by: INTERNAL MEDICINE

## 2017-05-16 PROCEDURE — 99999 PR PBB SHADOW E&M-EST. PATIENT-LVL II: CPT | Mod: PBBFAC,,, | Performed by: INTERNAL MEDICINE

## 2017-05-16 PROCEDURE — 1157F ADVNC CARE PLAN IN RCRD: CPT | Mod: S$GLB,,, | Performed by: INTERNAL MEDICINE

## 2017-05-16 PROCEDURE — 3044F HG A1C LEVEL LT 7.0%: CPT | Mod: S$GLB,,, | Performed by: INTERNAL MEDICINE

## 2017-05-16 PROCEDURE — 1159F MED LIST DOCD IN RCRD: CPT | Mod: S$GLB,,, | Performed by: INTERNAL MEDICINE

## 2017-05-16 PROCEDURE — 1125F AMNT PAIN NOTED PAIN PRSNT: CPT | Mod: S$GLB,,, | Performed by: INTERNAL MEDICINE

## 2017-05-16 NOTE — LETTER
May 17, 2017      Micaela Mendoza MD  [address] [facility]  [place] [address]           [facility]  [address] [place] [address]  Phone: [phone]          Patient: Regino Lawrence   MR Number: [id number]   YOB: 1945   Date of Visit: 5/16/2017       Dear Dr. Micaela Mendoza:    Thank you for referring Regino Lawrence to me for evaluation. Attached you will find relevant portions of my assessment and plan of care.    If you have questions, please do not hesitate to call me. I look forward to following Regino Lawrence along with you.    Sincerely,    Benjie Lau MD    Enclosure  CC:  No Recipients    If you would like to receive this communication electronically, please contact [email or web address] or [phone] to request more information on [employer] access.    For providers and/or their staff who would like to refer a patient to [facility], please contact us through our one-stop-shop provider referral line, [employer] [facility], at [phone].    If you feel you have received this communication in error or would no longer like to receive these types of communications, please e-mail [email or web address]

## 2017-05-17 NOTE — PROGRESS NOTES
Subjective:       Patient ID: Regino Lawrence is a 71 y.o. Black or  female who presents for new evaluation of Chronic Kidney Disease    HPI  Ms. Lawrence is a 71 year old woman with medical history of diabetes, hypertension presenting for evaluation of chronic kidney disease.  Patient reports blood sugars and blood pressure well-controlled at home.  She denies any NSAID use.  She otherwise denies any fever, chest pain, shortness of breath, abdominal pain, diarrhea, dysuria/hematuria.     Review of Systems   Constitutional: Negative for appetite change, fatigue and fever.   Respiratory: Negative for chest tightness and shortness of breath.    Cardiovascular: Negative for chest pain and leg swelling.   Gastrointestinal: Negative for abdominal pain, constipation, diarrhea, nausea and vomiting.   Genitourinary: Negative for difficulty urinating, dysuria, flank pain, frequency, hematuria and urgency.   Musculoskeletal: Negative for arthralgias, joint swelling and myalgias.   Skin: Negative for rash and wound.   Neurological: Negative for dizziness, weakness and light-headedness.   All other systems reviewed and are negative.      Objective:      Physical Exam   Constitutional: She appears well-developed and well-nourished.   Cardiovascular: Normal rate, regular rhythm and normal heart sounds.  Exam reveals no gallop and no friction rub.    No murmur heard.  Pulmonary/Chest: Effort normal and breath sounds normal. No respiratory distress. She has no wheezes. She has no rales.   Abdominal: Soft. Bowel sounds are normal. There is no tenderness.   Musculoskeletal: She exhibits no edema.   Neurological: She is alert.   Skin: Skin is warm and dry. No rash noted. No erythema.   Psychiatric: She has a normal mood and affect.   Vitals reviewed.      Assessment:       1. Controlled type 2 diabetes mellitus with stage 3 chronic kidney disease, without long-term current use of insulin    2. Chronic kidney disease,  stage III (moderate)        Plan:      Ms. Lawrence is a 71 year old woman with medical history of diabetes, hypertension presenting for evaluation of chronic kidney disease.  Patient with early CKD stage III, suspect due to age-related renal nephron loss, along with possible diabetic nephropathy v. hypertensive nephrosclerosis.  Patient creatinine improved and stable after prior elevation (likely with obstructive uropathy, currently resolved, followed by Urology/UroGyn), will continue to trend.  Stressed importance of blood pressure/glycemic control to prevent any further progression of kidney disease, patient voiced understanding.      Return to clinic in 6 months with renal/heme panel, iron/TIBC/ferritin, urinalysis/culture, urine protein/creatinine ratio prior to next visit

## 2017-05-18 ENCOUNTER — OFFICE VISIT (OUTPATIENT)
Dept: FAMILY MEDICINE | Facility: CLINIC | Age: 72
End: 2017-05-18
Payer: MEDICARE

## 2017-05-18 VITALS
HEART RATE: 100 BPM | DIASTOLIC BLOOD PRESSURE: 86 MMHG | BODY MASS INDEX: 24.85 KG/M2 | SYSTOLIC BLOOD PRESSURE: 146 MMHG | TEMPERATURE: 98 F | RESPIRATION RATE: 20 BRPM | HEIGHT: 60 IN | OXYGEN SATURATION: 97 % | WEIGHT: 126.56 LBS

## 2017-05-18 DIAGNOSIS — E11.8 CONTROLLED TYPE 2 DIABETES MELLITUS WITH COMPLICATION, WITHOUT LONG-TERM CURRENT USE OF INSULIN: Primary | Chronic | ICD-10-CM

## 2017-05-18 DIAGNOSIS — E03.9 HYPOTHYROIDISM, UNSPECIFIED TYPE: Chronic | ICD-10-CM

## 2017-05-18 DIAGNOSIS — I10 ESSENTIAL HYPERTENSION: Chronic | ICD-10-CM

## 2017-05-18 LAB
CREAT UR-MCNC: 60 MG/DL
MICROALBUMIN UR DL<=1MG/L-MCNC: 6 UG/ML
MICROALBUMIN/CREATININE RATIO: 10 UG/MG

## 2017-05-18 PROCEDURE — 82570 ASSAY OF URINE CREATININE: CPT

## 2017-05-18 PROCEDURE — 1160F RVW MEDS BY RX/DR IN RCRD: CPT | Mod: S$GLB,,, | Performed by: FAMILY MEDICINE

## 2017-05-18 PROCEDURE — 3077F SYST BP >= 140 MM HG: CPT | Mod: S$GLB,,, | Performed by: FAMILY MEDICINE

## 2017-05-18 PROCEDURE — 3044F HG A1C LEVEL LT 7.0%: CPT | Mod: S$GLB,,, | Performed by: FAMILY MEDICINE

## 2017-05-18 PROCEDURE — 3079F DIAST BP 80-89 MM HG: CPT | Mod: S$GLB,,, | Performed by: FAMILY MEDICINE

## 2017-05-18 PROCEDURE — 99999 PR PBB SHADOW E&M-EST. PATIENT-LVL V: CPT | Mod: PBBFAC,,, | Performed by: FAMILY MEDICINE

## 2017-05-18 PROCEDURE — 1157F ADVNC CARE PLAN IN RCRD: CPT | Mod: S$GLB,,, | Performed by: FAMILY MEDICINE

## 2017-05-18 PROCEDURE — 1159F MED LIST DOCD IN RCRD: CPT | Mod: S$GLB,,, | Performed by: FAMILY MEDICINE

## 2017-05-18 PROCEDURE — 99214 OFFICE O/P EST MOD 30 MIN: CPT | Mod: S$GLB,,, | Performed by: FAMILY MEDICINE

## 2017-05-18 PROCEDURE — 3066F NEPHROPATHY DOC TX: CPT | Mod: S$GLB,,, | Performed by: FAMILY MEDICINE

## 2017-05-18 PROCEDURE — 1126F AMNT PAIN NOTED NONE PRSNT: CPT | Mod: S$GLB,,, | Performed by: FAMILY MEDICINE

## 2017-05-18 PROCEDURE — 99499 UNLISTED E&M SERVICE: CPT | Mod: S$GLB,,, | Performed by: FAMILY MEDICINE

## 2017-05-18 NOTE — MR AVS SNAPSHOT
[facility]  [address]  [place] [address]  Phone: [phone]  Fax: [phone]                  Regino Lawrence   2017 1:00 PM   Office Visit    Description:  Female : 1945   Provider:  Micaela Mendoza MD   Department:  [facility]           Reason for Visit     Diabetes     Follow-up           Diagnoses this Visit        Comments    Controlled type 2 diabetes mellitus with complication, without long-term current use of insulin    -  Primary     Essential hypertension         Hypothyroidism, unspecified type                To Do List           Future Appointments        Provider Department Dept Phone    2017 10:00 AM CHAIR 07 [facility] [phone]    2017 10:05 AM LAB, [facility] [phone]    7/10/2017 1:00 PM Edith Pacheco MD [facility] [phone]      Goals (5 Years of Data)              5/8/17    11/29/16    10/17/16    COMPLETED: HDL > 40   60        Related Problems    Combined 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    HEMOGLOBIN A1C < 7.0     5.6  5.6    Related Problems    Diabetes mellitus type 2, controlled    COMPLETED: LDL CHOLESTEROL < 70   90.2        Related Problems    Combined 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      Follow-Up and Disposition     Return in about 6 months (around 2017) for Follow up.      [facility]     [facility] Nurse Care Line -  Assistance  Unless otherwise directed by your provider, please contact [facility], our nurse care line that is available for  assistance.     Registered nurses in the [facility] provide: appointment scheduling, clinical advisement, health education, and other advisory services.  Call: [phone] (toll free)               Medications           Message regarding Medications     Verify the changes and/or additions to your  medication regime listed below are the same as discussed with your clinician today.  If any of these changes or additions are incorrect, please notify your healthcare provider.        STOP taking these medications     METHYL SALICYLATE/MENTH/CAMPH (MUSCLE RUB, WITH CAMPHOR, TOP)            Verify that the below list of medications is an accurate representation of the medications you are currently taking.  If none reported, the list may be blank. If incorrect, please contact your healthcare provider. Carry this list with you in case of emergency.           Current Medications     ACCU-CHEK FASTCLIX Kit USE AS DIRECTED.    ALCOHOL ANTISEPTIC PADS (ALCOHOL PREP PADS TOP)     blood sugar diagnostic Strp 1 each by Misc.(Non-Drug; Combo Route) route once daily.    blood-glucose meter kit Use as instructed    calcium carbonate (OS-MALCOLM) 500 mg calcium (1,250 mg) tablet Take 1 tablet (500 mg total) by mouth 2 (two) times daily.    cetirizine (ZYRTEC) 10 MG tablet     cloNIDine (CATAPRES) 0.3 MG tablet Take 1 tablet (0.3 mg total) by mouth 3 (three) times daily.    cyclobenzaprine (FLEXERIL) 10 MG tablet Take 1 tablet (10 mg total) by mouth 3 (three) times daily.    diazePAM (VALIUM) 5 MG tablet Take 1 tablet (5 mg total) by mouth every 6 (six) hours as needed for Anxiety.    fenofibrate 160 MG Tab TAKE 1 TABLET ONE TIME DAILY    gabapentin (NEURONTIN) 400 MG capsule Take 1 capsule (400 mg total) by mouth every evening.    levothyroxine (SYNTHROID) 50 MCG tablet TAKE 1 TABLET (50 MCG TOTAL) BY MOUTH BEFORE BREAKFAST.    lisinopril (PRINIVIL,ZESTRIL) 20 MG tablet TAKE 1 TABLET ONE TIME DAILY    LUBRICANT EYE DROPS, GLYC-PG, 1-0.3 % Drop     metformin (GLUCOPHAGE) 1000 MG tablet Take 1 tablet (1,000 mg total) by mouth 2 (two) times daily with meals.    nifedipine (NIFEDICAL XL) 60 MG (OSM) 24 hr tablet Take 1 tablet (60 mg total) by mouth once daily.    ondansetron (ZOFRAN) 8 MG tablet Take 1 tablet (8 mg total) by mouth every  12 (twelve) hours as needed for Nausea.    potassium chloride SA (K-DUR,KLOR-CON) 20 MEQ tablet Take 1 tablet (20 mEq total) by mouth 2 (two) times daily.    predniSONE (DELTASONE) 10 MG tablet Take 1 tablet (10 mg total) by mouth once daily.    lancing device (ACCU-CHEK SOFTCLIX LANCET DEV) Misc Accu-chek FastClix kit           Clinical Reference Information           Your Vitals Were     BP Pulse Temp Resp Height Weight    146/80 100 98.3 °F (36.8 °C) (Oral) 20 5' (1.524 m) 57.4 kg (126 lb 8.7 oz)    Last Period SpO2 BMI          (LMP Unknown) 97% 24.71 kg/m2        Blood Pressure          Most Recent Value    BP  (!)  146/80      Allergies as of 5/18/2017     Azathioprine      Immunizations Administered on Date of Encounter - 5/18/2017     None      Orders Placed During Today's Visit      Normal Orders This Visit    Microalbumin/creatinine urine ratio     Future Labs/Procedures Expected by Expires    Hemoglobin A1c  5/18/2017 5/18/2018    TSH  5/18/2017 5/18/2018      Language Assistance Services     ATTENTION: Language assistance services are available, free of charge. Please call [phone].      ATENCIÓN: Si leandrola maria victoria, tiene a rivas disposición servicios gratuitos de asistencia lingüística. Llame al [phone].     JULIAN Ý: N?u b?n nói Ti?ng Vi?t, có các d?ch v? h? tr? ngôn ng? mi?n phí dành cho b?n. G?i s? [phone].         [facility] complies with applicable Federal civil rights laws and does not discriminate on the basis of race, color, national origin, age, disability, or sex.

## 2017-05-19 ENCOUNTER — INFUSION (OUTPATIENT)
Dept: INFUSION THERAPY | Facility: HOSPITAL | Age: 72
End: 2017-05-19
Attending: INTERNAL MEDICINE
Payer: MEDICARE

## 2017-05-19 VITALS — HEART RATE: 101 BPM | DIASTOLIC BLOOD PRESSURE: 67 MMHG | SYSTOLIC BLOOD PRESSURE: 128 MMHG | RESPIRATION RATE: 18 BRPM

## 2017-05-19 DIAGNOSIS — N18.9 ANEMIA IN CKD (CHRONIC KIDNEY DISEASE): Primary | ICD-10-CM

## 2017-05-19 DIAGNOSIS — Z53.1 REFUSAL OF BLOOD TRANSFUSIONS AS PATIENT IS JEHOVAH'S WITNESS: ICD-10-CM

## 2017-05-19 DIAGNOSIS — D63.1 ANEMIA IN CKD (CHRONIC KIDNEY DISEASE): Primary | ICD-10-CM

## 2017-05-19 LAB
ERYTHROCYTE [DISTWIDTH] IN BLOOD BY AUTOMATED COUNT: 13.2 %
HCT VFR BLD AUTO: 34.2 %
HGB BLD-MCNC: 11.2 G/DL
MCH RBC QN AUTO: 31.5 PG
MCHC RBC AUTO-ENTMCNC: 32.7 %
MCV RBC AUTO: 96 FL
PLATELET # BLD AUTO: 287 K/UL
PMV BLD AUTO: 8.8 FL
RBC # BLD AUTO: 3.56 M/UL
WBC # BLD AUTO: 6.37 K/UL

## 2017-05-19 PROCEDURE — 96374 THER/PROPH/DIAG INJ IV PUSH: CPT

## 2017-05-19 PROCEDURE — 85027 COMPLETE CBC AUTOMATED: CPT

## 2017-05-19 PROCEDURE — 63600175 PHARM REV CODE 636 W HCPCS: Performed by: INTERNAL MEDICINE

## 2017-05-19 RX ADMIN — IRON SUCROSE 200 MG: 20 INJECTION, SOLUTION INTRAVENOUS at 10:05

## 2017-05-22 ENCOUNTER — TELEPHONE (OUTPATIENT)
Dept: FAMILY MEDICINE | Facility: CLINIC | Age: 72
End: 2017-05-22

## 2017-05-22 NOTE — PROGRESS NOTES
Chief Complaint   Patient presents with    Diabetes    Follow-up       HPI  Regino Lawrence is a 71 y.o. female with multiple medical diagnoses as listed in the medical history and problem list that presents for follow-up for diabetes and hypothyroidism.    She has been feeling well and has recently seen her nephrologist.    PAST MEDICAL HISTORY:  Past Medical History:   Diagnosis Date    Acid reflux     Allergy     Alopecia     Anemia     Anxiety     Arthritis     Back pain     Cataract     Chronic kidney disease     Depression     Diabetes mellitus, type 2     Eye injury as a child     k-abrasion  od    Hyperlipidemia     Hypertension     Hypothyroidism     Immune deficiency disorder     Immune disorder     Myalgia and myositis 2012    Osteoporosis     Polyneuropathy     Renal manifestation of secondary diabetes mellitus     Sarcoidosis     Ulcer     no cancer    Urinary incontinence        PAST SURGICAL HISTORY:  Past Surgical History:   Procedure Laterality Date    CARPAL TUNNEL RELEASE      Rt wrist    CATARACT EXTRACTION W/  INTRAOCULAR LENS IMPLANT Right 2015    Dr. Azevedo    CATARACT EXTRACTION W/  INTRAOCULAR LENS IMPLANT Left 2015    Dr. Azevedo     SECTION      CHOLECYSTECTOMY      TUBAL LIGATION         SOCIAL HISTORY:  Social History     Social History    Marital status:      Spouse name: N/A    Number of children: N/A    Years of education: N/A     Occupational History    Not on file.     Social History Main Topics    Smoking status: Never Smoker    Smokeless tobacco: Never Used    Alcohol use No    Drug use: No    Sexual activity: Yes     Partners: Male     Other Topics Concern    Not on file     Social History Narrative    No narrative on file       FAMILY HISTORY:  Family History   Problem Relation Age of Onset    Hypertension Mother     Cataracts Mother     No Known Problems Father     Hypertension Maternal  Grandmother     Glaucoma Sister     Arthritis Sister     No Known Problems Brother     No Known Problems Maternal Aunt     No Known Problems Maternal Uncle     No Known Problems Paternal Aunt     No Known Problems Paternal Uncle     No Known Problems Maternal Grandfather     No Known Problems Paternal Grandmother     No Known Problems Paternal Grandfather     Kidney failure Sister     Hepatitis Sister     Cancer Sister      bone cancer     Immunodeficiency Sister     Lupus Neg Hx     Rheum arthritis Neg Hx     Amblyopia Neg Hx     Blindness Neg Hx     Diabetes Neg Hx     Macular degeneration Neg Hx     Retinal detachment Neg Hx     Strabismus Neg Hx     Stroke Neg Hx     Thyroid disease Neg Hx     Endometrial cancer Neg Hx     Vaginal cancer Neg Hx     Cervical cancer Neg Hx        ALLERGIES AND MEDICATIONS: updated and reviewed.  Review of patient's allergies indicates:   Allergen Reactions    Azathioprine Shortness Of Breath and Other (See Comments)     Fatigue     Current Outpatient Prescriptions   Medication Sig Dispense Refill    ACCU-CHEK FASTCLIX Kit USE AS DIRECTED. 1 each 0    ALCOHOL ANTISEPTIC PADS (ALCOHOL PREP PADS TOP)       blood sugar diagnostic Strp 1 each by Misc.(Non-Drug; Combo Route) route once daily. 100 strip 11    blood-glucose meter kit Use as instructed 1 each 0    calcium carbonate (OS-MALCOLM) 500 mg calcium (1,250 mg) tablet Take 1 tablet (500 mg total) by mouth 2 (two) times daily.  0    cetirizine (ZYRTEC) 10 MG tablet       cloNIDine (CATAPRES) 0.3 MG tablet Take 1 tablet (0.3 mg total) by mouth 3 (three) times daily. 270 tablet 1    cyclobenzaprine (FLEXERIL) 10 MG tablet Take 1 tablet (10 mg total) by mouth 3 (three) times daily. 180 tablet 1    diazePAM (VALIUM) 5 MG tablet Take 1 tablet (5 mg total) by mouth every 6 (six) hours as needed for Anxiety. 15 tablet 0    fenofibrate 160 MG Tab TAKE 1 TABLET ONE TIME DAILY 90 tablet 1    gabapentin  (NEURONTIN) 400 MG capsule Take 1 capsule (400 mg total) by mouth every evening. 90 capsule 1    levothyroxine (SYNTHROID) 50 MCG tablet TAKE 1 TABLET (50 MCG TOTAL) BY MOUTH BEFORE BREAKFAST. 90 tablet 1    lisinopril (PRINIVIL,ZESTRIL) 20 MG tablet TAKE 1 TABLET ONE TIME DAILY 90 tablet 1    LUBRICANT EYE DROPS, GLYC-PG, 1-0.3 % Drop       metformin (GLUCOPHAGE) 1000 MG tablet Take 1 tablet (1,000 mg total) by mouth 2 (two) times daily with meals. 180 tablet 1    nifedipine (NIFEDICAL XL) 60 MG (OSM) 24 hr tablet Take 1 tablet (60 mg total) by mouth once daily. 90 tablet 1    ondansetron (ZOFRAN) 8 MG tablet Take 1 tablet (8 mg total) by mouth every 12 (twelve) hours as needed for Nausea. 25 tablet 1    potassium chloride SA (K-DUR,KLOR-CON) 20 MEQ tablet Take 1 tablet (20 mEq total) by mouth 2 (two) times daily. (Patient taking differently: Take 20 mEq by mouth once daily. ) 180 tablet 1    predniSONE (DELTASONE) 10 MG tablet Take 1 tablet (10 mg total) by mouth once daily. 90 tablet 1    lancing device (ACCU-CHEK SOFTCLIX LANCET DEV) Misc Accu-chek FastClix kit 1 each 0     No current facility-administered medications for this visit.        ROS  Review of Systems   Constitutional: Negative for chills, diaphoresis, fatigue, fever and unexpected weight change.   HENT: Positive for rhinorrhea. Negative for hearing loss, sinus pressure, sore throat, tinnitus and trouble swallowing.    Eyes: Negative for photophobia, discharge and visual disturbance.   Respiratory: Negative for cough, chest tightness, shortness of breath and wheezing.    Cardiovascular: Negative for chest pain and palpitations.   Gastrointestinal: Negative for abdominal pain, blood in stool, constipation, diarrhea, nausea and vomiting.   Endocrine: Negative for polydipsia and polyuria.   Genitourinary: Negative for difficulty urinating, dysuria, flank pain, frequency, hematuria, menstrual problem and vaginal discharge.   Musculoskeletal:  Positive for arthralgias. Negative for joint swelling.   Skin: Negative for rash.   Neurological: Positive for weakness. Negative for speech difficulty, light-headedness and headaches.   Psychiatric/Behavioral: Negative for behavioral problems, confusion and dysphoric mood.       Physical Exam  Vitals:    05/18/17 1258 05/18/17 1327   BP: (!) 146/80 (!) 146/86   Pulse: 100    Resp: 20    Temp: 98.3 °F (36.8 °C)    TempSrc: Oral    SpO2: 97%    Weight: 57.4 kg (126 lb 8.7 oz)    Height: 5' (1.524 m)     Body mass index is 24.71 kg/m².  Weight: 57.4 kg (126 lb 8.7 oz)   Height: 5' (152.4 cm)     Physical Exam   Constitutional: She is oriented to person, place, and time. She appears well-developed and well-nourished.   Eyes: EOM are normal.   Neurological: She is alert and oriented to person, place, and time.   Skin: Skin is warm and dry. No rash noted. No erythema.   Psychiatric: She has a normal mood and affect. Her behavior is normal.   Nursing note and vitals reviewed.      Health Maintenance       Date Due Completion Date    Hemoglobin A1c 05/29/2017 11/29/2016    Influenza Vaccine 08/01/2017 10/18/2016    Eye Exam 03/23/2018 3/23/2017    Mammogram 04/17/2018 4/17/2017    Lipid Panel 05/08/2018 5/8/2017    Foot Exam 05/12/2018 5/12/2017 (Done)    Override on 5/12/2017: Done    Urine Microalbumin 05/18/2018 5/18/2017    DEXA SCAN 05/05/2020 5/5/2017    Colonoscopy 02/09/2025 2/9/2015 (Done)    Override on 2/9/2015: Done    Override on 2/18/2005: Done (reportedly normal)    TETANUS VACCINE 05/16/2026 5/16/2016            ASSESSMENT     1. Controlled type 2 diabetes mellitus with complication, without long-term current use of insulin    2. Essential hypertension    3. Hypothyroidism, unspecified type        PLAN:     Controlled type 2 diabetes mellitus with complication, without long-term current use of insulin  -     Hemoglobin A1c; Future; Expected date: 05/18/2017  -     Microalbumin/creatinine urine  ratio    Essential hypertension    Hypothyroidism, unspecified type  -     TSH; Future; Expected date: 05/18/2017    we will check her A1C and urine test  Update thyroid labwork  Return in one month for nurse blood pressure check      Micaela Mendoza MD  05/22/2017 12:23 PM        Return in about 6 months (around 11/18/2017) for Follow up.

## 2017-05-22 NOTE — TELEPHONE ENCOUNTER
Please have her come in one month for nurse blood pressure check; her recheck was elevated at her last visit    Micaela Mendoza MD

## 2017-05-29 ENCOUNTER — PATIENT MESSAGE (OUTPATIENT)
Dept: FAMILY MEDICINE | Facility: CLINIC | Age: 72
End: 2017-05-29

## 2017-05-29 DIAGNOSIS — E78.2 COMBINED HYPERLIPIDEMIA ASSOCIATED WITH TYPE 2 DIABETES MELLITUS: Chronic | ICD-10-CM

## 2017-05-29 DIAGNOSIS — G62.9 POLYNEUROPATHY: ICD-10-CM

## 2017-05-29 DIAGNOSIS — I10 ESSENTIAL HYPERTENSION: Chronic | ICD-10-CM

## 2017-05-29 DIAGNOSIS — E03.9 HYPOTHYROIDISM, UNSPECIFIED TYPE: Primary | ICD-10-CM

## 2017-05-29 DIAGNOSIS — E11.22 DIABETES MELLITUS WITH STAGE 3 CHRONIC KIDNEY DISEASE: Chronic | ICD-10-CM

## 2017-05-29 DIAGNOSIS — N18.30 DIABETES MELLITUS WITH STAGE 3 CHRONIC KIDNEY DISEASE: Chronic | ICD-10-CM

## 2017-05-29 DIAGNOSIS — E11.69 COMBINED HYPERLIPIDEMIA ASSOCIATED WITH TYPE 2 DIABETES MELLITUS: Chronic | ICD-10-CM

## 2017-05-29 DIAGNOSIS — E87.6 HYPOKALEMIA: ICD-10-CM

## 2017-05-30 DIAGNOSIS — N18.30 DIABETES MELLITUS WITH STAGE 3 CHRONIC KIDNEY DISEASE: Chronic | ICD-10-CM

## 2017-05-30 DIAGNOSIS — E11.69 COMBINED HYPERLIPIDEMIA ASSOCIATED WITH TYPE 2 DIABETES MELLITUS: Chronic | ICD-10-CM

## 2017-05-30 DIAGNOSIS — G62.9 POLYNEUROPATHY: ICD-10-CM

## 2017-05-30 DIAGNOSIS — E11.22 DIABETES MELLITUS WITH STAGE 3 CHRONIC KIDNEY DISEASE: Chronic | ICD-10-CM

## 2017-05-30 DIAGNOSIS — I10 ESSENTIAL HYPERTENSION: Chronic | ICD-10-CM

## 2017-05-30 DIAGNOSIS — E03.9 HYPOTHYROIDISM, UNSPECIFIED TYPE: ICD-10-CM

## 2017-05-30 DIAGNOSIS — E78.2 COMBINED HYPERLIPIDEMIA ASSOCIATED WITH TYPE 2 DIABETES MELLITUS: Chronic | ICD-10-CM

## 2017-05-30 RX ORDER — LEVOTHYROXINE SODIUM 50 UG/1
TABLET ORAL
Qty: 90 TABLET | Refills: 1 | Status: SHIPPED | OUTPATIENT
Start: 2017-05-30 | End: 2017-05-30 | Stop reason: SDUPTHER

## 2017-05-30 RX ORDER — FENOFIBRATE 160 MG/1
TABLET ORAL
Qty: 90 TABLET | Refills: 1 | Status: SHIPPED | OUTPATIENT
Start: 2017-05-30 | End: 2017-05-30 | Stop reason: SDUPTHER

## 2017-05-30 RX ORDER — LEVOTHYROXINE SODIUM 50 UG/1
TABLET ORAL
Qty: 90 TABLET | Refills: 1 | Status: SHIPPED | OUTPATIENT
Start: 2017-05-30 | End: 2017-08-29 | Stop reason: SDUPTHER

## 2017-05-30 RX ORDER — LISINOPRIL 20 MG/1
TABLET ORAL
Qty: 90 TABLET | Refills: 1 | Status: SHIPPED | OUTPATIENT
Start: 2017-05-30 | End: 2017-05-30 | Stop reason: SDUPTHER

## 2017-05-30 RX ORDER — POTASSIUM CHLORIDE 20 MEQ/1
20 TABLET, EXTENDED RELEASE ORAL 2 TIMES DAILY
Qty: 180 TABLET | Refills: 1 | Status: SHIPPED | OUTPATIENT
Start: 2017-05-30 | End: 2017-06-16 | Stop reason: SDUPTHER

## 2017-05-30 RX ORDER — CARVEDILOL 12.5 MG/1
12.5 TABLET ORAL DAILY
COMMUNITY
End: 2017-05-30 | Stop reason: SDUPTHER

## 2017-05-30 RX ORDER — CARVEDILOL 12.5 MG/1
12.5 TABLET ORAL DAILY
Qty: 90 TABLET | Refills: 1 | Status: SHIPPED | OUTPATIENT
Start: 2017-05-30 | End: 2017-11-28 | Stop reason: SDUPTHER

## 2017-05-30 RX ORDER — FENOFIBRATE 160 MG/1
TABLET ORAL
Qty: 90 TABLET | Refills: 1 | Status: SHIPPED | OUTPATIENT
Start: 2017-05-30 | End: 2017-12-05 | Stop reason: SDUPTHER

## 2017-05-30 RX ORDER — GABAPENTIN 400 MG/1
400 CAPSULE ORAL NIGHTLY
Qty: 90 CAPSULE | Refills: 1 | Status: SHIPPED | OUTPATIENT
Start: 2017-05-30 | End: 2017-06-29 | Stop reason: SDUPTHER

## 2017-05-30 RX ORDER — LISINOPRIL 20 MG/1
TABLET ORAL
Qty: 90 TABLET | Refills: 1 | Status: SHIPPED | OUTPATIENT
Start: 2017-05-30 | End: 2017-10-30 | Stop reason: SDUPTHER

## 2017-05-30 RX ORDER — GABAPENTIN 400 MG/1
400 CAPSULE ORAL NIGHTLY
Qty: 90 CAPSULE | Refills: 1 | Status: SHIPPED | OUTPATIENT
Start: 2017-05-30 | End: 2017-05-30 | Stop reason: SDUPTHER

## 2017-05-30 NOTE — TELEPHONE ENCOUNTER
Hi Dr Mendoza,    Some of my Rx's need to be renewed:  Lisinopril 20mg,  Fenofibrate 160mg,  Levothyroxine 50mcg,  Carvedilol 12.5mg,  Gabapentin 400mg.   I have a Rx for Nifedipine er 60mg that cost 62 dollars I can't afford it, is there a medicine similar to this one that you can prescribe   me.                                            Thank You

## 2017-06-02 ENCOUNTER — INFUSION (OUTPATIENT)
Dept: INFUSION THERAPY | Facility: HOSPITAL | Age: 72
End: 2017-06-02
Attending: INTERNAL MEDICINE
Payer: MEDICARE

## 2017-06-02 VITALS — RESPIRATION RATE: 16 BRPM | HEART RATE: 90 BPM | DIASTOLIC BLOOD PRESSURE: 64 MMHG | SYSTOLIC BLOOD PRESSURE: 127 MMHG

## 2017-06-02 DIAGNOSIS — N18.9 ANEMIA IN CKD (CHRONIC KIDNEY DISEASE): Primary | ICD-10-CM

## 2017-06-02 DIAGNOSIS — Z53.1 REFUSAL OF BLOOD TRANSFUSIONS AS PATIENT IS JEHOVAH'S WITNESS: ICD-10-CM

## 2017-06-02 DIAGNOSIS — D63.1 ANEMIA IN CKD (CHRONIC KIDNEY DISEASE): Primary | ICD-10-CM

## 2017-06-02 PROCEDURE — 96372 THER/PROPH/DIAG INJ SC/IM: CPT

## 2017-06-02 PROCEDURE — 63600175 PHARM REV CODE 636 W HCPCS: Performed by: INTERNAL MEDICINE

## 2017-06-02 RX ADMIN — ERYTHROPOIETIN 10000 UNITS: 10000 INJECTION, SOLUTION INTRAVENOUS; SUBCUTANEOUS at 09:06

## 2017-06-02 NOTE — PLAN OF CARE
Problem: Patient Care Overview (Adult)  Goal: Individualization & Mutuality  Outcome: Ongoing (interventions implemented as appropriate)  Pt goes to lab before injection appt.

## 2017-06-02 NOTE — PLAN OF CARE
Problem: Patient Care Overview (Adult)  Goal: Plan of Care Review  Pt will report any increased fatigue and or SOB.

## 2017-06-15 ENCOUNTER — PATIENT MESSAGE (OUTPATIENT)
Dept: FAMILY MEDICINE | Facility: CLINIC | Age: 72
End: 2017-06-15

## 2017-06-15 ENCOUNTER — PATIENT MESSAGE (OUTPATIENT)
Dept: RHEUMATOLOGY | Facility: CLINIC | Age: 72
End: 2017-06-15

## 2017-06-15 DIAGNOSIS — E87.6 HYPOKALEMIA: ICD-10-CM

## 2017-06-16 ENCOUNTER — INFUSION (OUTPATIENT)
Dept: INFUSION THERAPY | Facility: HOSPITAL | Age: 72
End: 2017-06-16
Attending: INTERNAL MEDICINE
Payer: MEDICARE

## 2017-06-16 VITALS — DIASTOLIC BLOOD PRESSURE: 62 MMHG | SYSTOLIC BLOOD PRESSURE: 121 MMHG | HEART RATE: 94 BPM | RESPIRATION RATE: 16 BRPM

## 2017-06-16 DIAGNOSIS — N18.9 ANEMIA IN CKD (CHRONIC KIDNEY DISEASE): Primary | ICD-10-CM

## 2017-06-16 DIAGNOSIS — D63.1 ANEMIA IN CKD (CHRONIC KIDNEY DISEASE): Primary | ICD-10-CM

## 2017-06-16 DIAGNOSIS — Z53.1 REFUSAL OF BLOOD TRANSFUSIONS AS PATIENT IS JEHOVAH'S WITNESS: ICD-10-CM

## 2017-06-16 PROCEDURE — 96372 THER/PROPH/DIAG INJ SC/IM: CPT

## 2017-06-16 PROCEDURE — 63600175 PHARM REV CODE 636 W HCPCS: Performed by: INTERNAL MEDICINE

## 2017-06-16 RX ORDER — POTASSIUM CHLORIDE 20 MEQ/1
20 TABLET, EXTENDED RELEASE ORAL 2 TIMES DAILY
Qty: 180 TABLET | Refills: 1 | Status: SHIPPED | OUTPATIENT
Start: 2017-06-16 | End: 2018-04-19 | Stop reason: SDUPTHER

## 2017-06-16 RX ADMIN — ERYTHROPOIETIN 10000 UNITS: 10000 INJECTION, SOLUTION INTRAVENOUS; SUBCUTANEOUS at 10:06

## 2017-06-16 NOTE — TELEPHONE ENCOUNTER
Pt needs this sent to [facility]. Went to [facility] in error. Please re send.

## 2017-06-16 NOTE — NURSING
Patient received Procrit injection. Tolerated well. No reactions noted. Patient received discharge instructions and verbalized understanding.

## 2017-06-29 ENCOUNTER — OFFICE VISIT (OUTPATIENT)
Dept: FAMILY MEDICINE | Facility: CLINIC | Age: 72
End: 2017-06-29
Payer: MEDICARE

## 2017-06-29 ENCOUNTER — PATIENT MESSAGE (OUTPATIENT)
Dept: RHEUMATOLOGY | Facility: CLINIC | Age: 72
End: 2017-06-29

## 2017-06-29 VITALS
WEIGHT: 131.5 LBS | BODY MASS INDEX: 25.82 KG/M2 | HEART RATE: 94 BPM | DIASTOLIC BLOOD PRESSURE: 74 MMHG | RESPIRATION RATE: 18 BRPM | OXYGEN SATURATION: 96 % | HEIGHT: 60 IN | TEMPERATURE: 98 F | SYSTOLIC BLOOD PRESSURE: 122 MMHG

## 2017-06-29 DIAGNOSIS — M81.0 OSTEOPOROSIS, UNSPECIFIED OSTEOPOROSIS TYPE, UNSPECIFIED PATHOLOGICAL FRACTURE PRESENCE: Chronic | ICD-10-CM

## 2017-06-29 DIAGNOSIS — Z79.52 CURRENT USE OF STEROID MEDICATION: ICD-10-CM

## 2017-06-29 DIAGNOSIS — G62.9 POLYNEUROPATHY: ICD-10-CM

## 2017-06-29 DIAGNOSIS — N18.9 ANEMIA IN CKD (CHRONIC KIDNEY DISEASE): ICD-10-CM

## 2017-06-29 DIAGNOSIS — I10 ESSENTIAL HYPERTENSION: Chronic | ICD-10-CM

## 2017-06-29 DIAGNOSIS — E11.8 CONTROLLED TYPE 2 DIABETES MELLITUS WITH COMPLICATION, WITHOUT LONG-TERM CURRENT USE OF INSULIN: Primary | Chronic | ICD-10-CM

## 2017-06-29 DIAGNOSIS — D63.1 ANEMIA IN CKD (CHRONIC KIDNEY DISEASE): ICD-10-CM

## 2017-06-29 PROCEDURE — 1157F ADVNC CARE PLAN IN RCRD: CPT | Mod: S$GLB,,, | Performed by: FAMILY MEDICINE

## 2017-06-29 PROCEDURE — 99214 OFFICE O/P EST MOD 30 MIN: CPT | Mod: S$GLB,,, | Performed by: FAMILY MEDICINE

## 2017-06-29 PROCEDURE — 99499 UNLISTED E&M SERVICE: CPT | Mod: S$GLB,,, | Performed by: FAMILY MEDICINE

## 2017-06-29 PROCEDURE — 99999 PR PBB SHADOW E&M-EST. PATIENT-LVL III: CPT | Mod: PBBFAC,,, | Performed by: FAMILY MEDICINE

## 2017-06-29 PROCEDURE — 3044F HG A1C LEVEL LT 7.0%: CPT | Mod: S$GLB,,, | Performed by: FAMILY MEDICINE

## 2017-06-29 PROCEDURE — 3066F NEPHROPATHY DOC TX: CPT | Mod: S$GLB,,, | Performed by: FAMILY MEDICINE

## 2017-06-29 PROCEDURE — 1159F MED LIST DOCD IN RCRD: CPT | Mod: S$GLB,,, | Performed by: FAMILY MEDICINE

## 2017-06-29 PROCEDURE — 1125F AMNT PAIN NOTED PAIN PRSNT: CPT | Mod: S$GLB,,, | Performed by: FAMILY MEDICINE

## 2017-06-29 RX ORDER — GABAPENTIN 400 MG/1
400 CAPSULE ORAL 2 TIMES DAILY
Qty: 180 CAPSULE | Refills: 1 | Status: SHIPPED | OUTPATIENT
Start: 2017-06-29 | End: 2018-09-12 | Stop reason: SDUPTHER

## 2017-06-29 NOTE — PROGRESS NOTES
Chief Complaint   Patient presents with    Hypertension    Diabetes    Follow-up       HPI  Regino Lawrence is a 71 y.o. female with multiple medical diagnoses as listed in the medical history and problem list that presents for follow-up for diabetes, hypertension.     Her blood pressure which had been elevated when she was in pain, is under control but her nifedipine is 60 dollars and she would like something cheaper. She is also having all over body pain in her joints. She was told by her Rheumatologist that her bone scan shows thinning and they are going to have to stop the steroids. She is taking tramadol at night but this does not help her in the daytime. She takes neurontin also but at night.    She has been having increased fatigue and weakness and her blood counts have dropped so she is receiving epogen shots to treat this.    PAST MEDICAL HISTORY:  Past Medical History:   Diagnosis Date    Acid reflux     Allergy     Alopecia     Anemia     Anxiety     Arthritis     Back pain     Cataract     Chronic kidney disease     Depression     Diabetes mellitus, type 2     Eye injury as a child     k-abrasion  od    Hyperlipidemia     Hypertension     Hypothyroidism     Immune deficiency disorder     Immune disorder     Myalgia and myositis 2012    Osteoporosis     Polyneuropathy     Renal manifestation of secondary diabetes mellitus     Sarcoidosis     Ulcer     no cancer    Urinary incontinence        PAST SURGICAL HISTORY:  Past Surgical History:   Procedure Laterality Date    CARPAL TUNNEL RELEASE      Rt wrist    CATARACT EXTRACTION W/  INTRAOCULAR LENS IMPLANT Right 2015    Dr. Azevedo    CATARACT EXTRACTION W/  INTRAOCULAR LENS IMPLANT Left 2015    Dr. Azevedo     SECTION      CHOLECYSTECTOMY      TUBAL LIGATION         SOCIAL HISTORY:  Social History     Social History    Marital status:      Spouse name: N/A    Number of children: N/A     Years of education: N/A     Occupational History    Not on file.     Social History Main Topics    Smoking status: Never Smoker    Smokeless tobacco: Never Used    Alcohol use No    Drug use: No    Sexual activity: Yes     Partners: Male     Other Topics Concern    Not on file     Social History Narrative    No narrative on file       FAMILY HISTORY:  Family History   Problem Relation Age of Onset    Hypertension Mother     Cataracts Mother     No Known Problems Father     Hypertension Maternal Grandmother     Glaucoma Sister     Arthritis Sister     No Known Problems Brother     No Known Problems Maternal Aunt     No Known Problems Maternal Uncle     No Known Problems Paternal Aunt     No Known Problems Paternal Uncle     No Known Problems Maternal Grandfather     No Known Problems Paternal Grandmother     No Known Problems Paternal Grandfather     Kidney failure Sister     Hepatitis Sister     Cancer Sister      bone cancer     Immunodeficiency Sister     Lupus Neg Hx     Rheum arthritis Neg Hx     Amblyopia Neg Hx     Blindness Neg Hx     Diabetes Neg Hx     Macular degeneration Neg Hx     Retinal detachment Neg Hx     Strabismus Neg Hx     Stroke Neg Hx     Thyroid disease Neg Hx     Endometrial cancer Neg Hx     Vaginal cancer Neg Hx     Cervical cancer Neg Hx        ALLERGIES AND MEDICATIONS: updated and reviewed.  Review of patient's allergies indicates:   Allergen Reactions    Azathioprine Shortness Of Breath and Other (See Comments)     Fatigue     Current Outpatient Prescriptions   Medication Sig Dispense Refill    ACCU-CHEK FASTCLIX Kit USE AS DIRECTED. 1 each 0    ALCOHOL ANTISEPTIC PADS (ALCOHOL PREP PADS TOP)       blood sugar diagnostic Strp 1 each by Misc.(Non-Drug; Combo Route) route once daily. 100 strip 11    blood-glucose meter kit Use as instructed 1 each 0    calcium carbonate (OS-MALCOLM) 500 mg calcium (1,250 mg) tablet Take 1 tablet (500 mg total) by  mouth 2 (two) times daily.  0    carvedilol (COREG) 12.5 MG tablet Take 1 tablet (12.5 mg total) by mouth once daily at 6am. 90 tablet 1    cetirizine (ZYRTEC) 10 MG tablet       cloNIDine (CATAPRES) 0.3 MG tablet Take 1 tablet (0.3 mg total) by mouth 3 (three) times daily. 270 tablet 1    cyclobenzaprine (FLEXERIL) 10 MG tablet Take 1 tablet (10 mg total) by mouth 3 (three) times daily. 180 tablet 1    diazePAM (VALIUM) 5 MG tablet Take 1 tablet (5 mg total) by mouth every 6 (six) hours as needed for Anxiety. 15 tablet 0    fenofibrate 160 MG Tab TAKE 1 TABLET ONE TIME DAILY 90 tablet 1    gabapentin (NEURONTIN) 400 MG capsule Take 1 capsule (400 mg total) by mouth 2 (two) times daily. 180 capsule 1    lancing device (ACCU-CHEK SOFTCLIX LANCET DEV) Misc Accu-chek FastClix kit 1 each 0    levothyroxine (SYNTHROID) 50 MCG tablet TAKE 1 TABLET (50 MCG TOTAL) BY MOUTH BEFORE BREAKFAST. 90 tablet 1    lisinopril (PRINIVIL,ZESTRIL) 20 MG tablet TAKE 1 TABLET ONE TIME DAILY 90 tablet 1    LUBRICANT EYE DROPS, GLYC-PG, 1-0.3 % Drop       metformin (GLUCOPHAGE) 1000 MG tablet Take 1 tablet (1,000 mg total) by mouth 2 (two) times daily with meals. 180 tablet 1    ondansetron (ZOFRAN) 8 MG tablet Take 1 tablet (8 mg total) by mouth every 12 (twelve) hours as needed for Nausea. 25 tablet 1    potassium chloride SA (K-DUR,KLOR-CON) 20 MEQ tablet Take 1 tablet (20 mEq total) by mouth 2 (two) times daily. 180 tablet 1    predniSONE (DELTASONE) 10 MG tablet Take 1 tablet (10 mg total) by mouth once daily. 90 tablet 1    nifedipine (NIFEDICAL XL) 60 MG (OSM) 24 hr tablet Take 1 tablet (60 mg total) by mouth once daily. 90 tablet 1     No current facility-administered medications for this visit.        ROS  Review of Systems   Constitutional: Positive for activity change. Negative for unexpected weight change.   HENT: Positive for rhinorrhea. Negative for hearing loss and trouble swallowing.    Eyes: Negative for  discharge and visual disturbance.   Respiratory: Negative for chest tightness and wheezing.    Cardiovascular: Positive for palpitations. Negative for chest pain.   Gastrointestinal: Positive for constipation. Negative for blood in stool, diarrhea and vomiting.   Endocrine: Negative for polydipsia and polyuria.   Genitourinary: Negative for difficulty urinating, dysuria, hematuria and menstrual problem.   Musculoskeletal: Positive for arthralgias, joint swelling and neck pain.   Neurological: Positive for weakness. Negative for headaches.   Psychiatric/Behavioral: Negative for confusion and dysphoric mood.       Physical Exam  Vitals:    06/29/17 0923   BP: 122/74   Pulse: 94   Resp: 18   Temp: 97.8 °F (36.6 °C)   TempSrc: Oral   SpO2: 96%   Weight: 59.6 kg (131 lb 8.1 oz)   Height: 5' (1.524 m)    Body mass index is 25.68 kg/m².  Weight: 59.6 kg (131 lb 8.1 oz)   Height: 5' (152.4 cm)     Physical Exam   Constitutional: She is oriented to person, place, and time. She appears well-developed and well-nourished.   Eyes: EOM are normal.   Neurological: She is alert and oriented to person, place, and time.   Skin: Skin is warm and dry. No rash noted. No erythema.   Psychiatric: She has a normal mood and affect. Her behavior is normal.   Nursing note and vitals reviewed.      Health Maintenance       Date Due Completion Date    Hemoglobin A1c 05/29/2017 11/29/2016    Influenza Vaccine 08/01/2017 10/18/2016    Eye Exam 03/23/2018 3/23/2017    Mammogram 04/18/2018 4/18/2017    Lipid Panel 05/08/2018 5/8/2017    Foot Exam 05/12/2018 5/12/2017 (Done)    Override on 5/12/2017: Done    Urine Microalbumin 05/18/2018 5/18/2017    DEXA SCAN 05/05/2020 5/5/2017    Colonoscopy 02/09/2025 2/9/2015 (Done)    Override on 2/9/2015: Done    Override on 2/18/2005: Done (reportedly normal)    TETANUS VACCINE 05/16/2026 5/16/2016            ASSESSMENT     1. Controlled type 2 diabetes mellitus with complication, without long-term current  use of insulin    2. Essential hypertension    3. Current use of steroid medication    4. Osteoporosis, unspecified osteoporosis type, unspecified pathological fracture presence    5. Polyneuropathy    6. Anemia in CKD (chronic kidney disease)        PLAN:     Controlled type 2 diabetes mellitus with complication, without long-term current use of insulin  -     Hemoglobin A1c; Future; Expected date: 06/29/2017    Essential hypertension    Current use of steroid medication    Osteoporosis, unspecified osteoporosis type, unspecified pathological fracture presence    Polyneuropathy  -     gabapentin (NEURONTIN) 400 MG capsule; Take 1 capsule (400 mg total) by mouth 2 (two) times daily.  Dispense: 180 capsule; Refill: 1    Anemia in CKD (chronic kidney disease)      Increase neurontin to BID dosing, follow up with rheumatology for treatment of osteoporosis as this may improve her pain also  Blood pressure stable, she will call her insurance company to find out what alternatives are affordable for her blood pressure medication and let me know, we can change it and she can monitor at home  Check A1C  Continue to follow up with hematology for her anemia    Micaela Mendoza MD  06/29/2017 10:23 AM        Return in about 2 months (around 8/29/2017) for Follow up.

## 2017-06-30 ENCOUNTER — INFUSION (OUTPATIENT)
Dept: INFUSION THERAPY | Facility: HOSPITAL | Age: 72
End: 2017-06-30
Attending: INTERNAL MEDICINE
Payer: MEDICARE

## 2017-06-30 DIAGNOSIS — Z53.1 REFUSAL OF BLOOD TRANSFUSIONS AS PATIENT IS JEHOVAH'S WITNESS: ICD-10-CM

## 2017-06-30 DIAGNOSIS — D63.1 ANEMIA IN CKD (CHRONIC KIDNEY DISEASE): Primary | ICD-10-CM

## 2017-06-30 DIAGNOSIS — N18.9 ANEMIA IN CKD (CHRONIC KIDNEY DISEASE): Primary | ICD-10-CM

## 2017-06-30 PROCEDURE — 63600175 PHARM REV CODE 636 W HCPCS: Performed by: INTERNAL MEDICINE

## 2017-06-30 PROCEDURE — 96372 THER/PROPH/DIAG INJ SC/IM: CPT

## 2017-06-30 RX ADMIN — ERYTHROPOIETIN 10000 UNITS: 10000 INJECTION, SOLUTION INTRAVENOUS; SUBCUTANEOUS at 10:06

## 2017-07-03 ENCOUNTER — PATIENT MESSAGE (OUTPATIENT)
Dept: FAMILY MEDICINE | Facility: CLINIC | Age: 72
End: 2017-07-03

## 2017-07-03 RX ORDER — AMLODIPINE BESYLATE 5 MG/1
5 TABLET ORAL DAILY
Qty: 90 TABLET | Refills: 0 | Status: SHIPPED | OUTPATIENT
Start: 2017-07-03 | End: 2017-08-02

## 2017-07-05 ENCOUNTER — PATIENT MESSAGE (OUTPATIENT)
Dept: RHEUMATOLOGY | Facility: CLINIC | Age: 72
End: 2017-07-05

## 2017-07-06 NOTE — TELEPHONE ENCOUNTER
Patient reports swelling in knees and hands.  She hurts all over.  Primary doctor increased gabapentin but did not help.   Similar to previous flare ups.  Patient to increased from prednisone 10 mg.    Patient to increase prednisone to 20 mg daily for 1 week then decrease back 10 mg.  Patient to contact office on Monday.      Injection in early May only lasted to end of May.

## 2017-07-07 ENCOUNTER — LAB VISIT (OUTPATIENT)
Dept: LAB | Facility: HOSPITAL | Age: 72
End: 2017-07-07
Attending: INTERNAL MEDICINE
Payer: MEDICARE

## 2017-07-07 DIAGNOSIS — D63.1 ANEMIA IN CHRONIC KIDNEY DISEASE(285.21): ICD-10-CM

## 2017-07-07 DIAGNOSIS — Z53.1 REFUSAL OF BLOOD TRANSFUSIONS AS PATIENT IS JEHOVAH'S WITNESS: ICD-10-CM

## 2017-07-07 DIAGNOSIS — D63.1 ANEMIA DUE TO CHRONIC KIDNEY DISEASE: ICD-10-CM

## 2017-07-07 DIAGNOSIS — N18.9 ANEMIA DUE TO CHRONIC KIDNEY DISEASE: ICD-10-CM

## 2017-07-07 DIAGNOSIS — N18.9 ANEMIA IN CHRONIC KIDNEY DISEASE(285.21): ICD-10-CM

## 2017-07-07 LAB
BASOPHILS # BLD AUTO: 0.01 K/UL
BASOPHILS NFR BLD: 0.2 %
DIFFERENTIAL METHOD: ABNORMAL
EOSINOPHIL # BLD AUTO: 0 K/UL
EOSINOPHIL NFR BLD: 0.2 %
ERYTHROCYTE [DISTWIDTH] IN BLOOD BY AUTOMATED COUNT: 13.2 %
FERRITIN SERPL-MCNC: 108 NG/ML
HCT VFR BLD AUTO: 33 %
HGB BLD-MCNC: 11 G/DL
IRON SERPL-MCNC: 45 UG/DL
LYMPHOCYTES # BLD AUTO: 1 K/UL
LYMPHOCYTES NFR BLD: 16.1 %
MCH RBC QN AUTO: 32.5 PG
MCHC RBC AUTO-ENTMCNC: 33.3 %
MCV RBC AUTO: 98 FL
MONOCYTES # BLD AUTO: 0.5 K/UL
MONOCYTES NFR BLD: 8.3 %
NEUTROPHILS # BLD AUTO: 4.6 K/UL
NEUTROPHILS NFR BLD: 74.7 %
PLATELET # BLD AUTO: 327 K/UL
PMV BLD AUTO: 8.5 FL
RBC # BLD AUTO: 3.38 M/UL
SATURATED IRON: 13 %
TOTAL IRON BINDING CAPACITY: 351 UG/DL
TRANSFERRIN SERPL-MCNC: 237 MG/DL
WBC # BLD AUTO: 6.16 K/UL

## 2017-07-07 PROCEDURE — 82728 ASSAY OF FERRITIN: CPT

## 2017-07-07 PROCEDURE — 85025 COMPLETE CBC W/AUTO DIFF WBC: CPT

## 2017-07-07 PROCEDURE — 36415 COLL VENOUS BLD VENIPUNCTURE: CPT

## 2017-07-07 PROCEDURE — 83540 ASSAY OF IRON: CPT

## 2017-07-10 ENCOUNTER — OFFICE VISIT (OUTPATIENT)
Dept: HEMATOLOGY/ONCOLOGY | Facility: CLINIC | Age: 72
End: 2017-07-10
Payer: MEDICARE

## 2017-07-10 VITALS
SYSTOLIC BLOOD PRESSURE: 164 MMHG | WEIGHT: 131.38 LBS | HEART RATE: 87 BPM | TEMPERATURE: 99 F | OXYGEN SATURATION: 94 % | BODY MASS INDEX: 25.66 KG/M2 | DIASTOLIC BLOOD PRESSURE: 78 MMHG

## 2017-07-10 DIAGNOSIS — M85.80 OSTEOPENIA, UNSPECIFIED LOCATION: ICD-10-CM

## 2017-07-10 DIAGNOSIS — D63.1 ANEMIA IN CHRONIC KIDNEY DISEASE, UNSPECIFIED CKD STAGE: Primary | ICD-10-CM

## 2017-07-10 DIAGNOSIS — D86.9 SARCOIDOSIS: ICD-10-CM

## 2017-07-10 DIAGNOSIS — N18.9 ANEMIA IN CHRONIC KIDNEY DISEASE, UNSPECIFIED CKD STAGE: Primary | ICD-10-CM

## 2017-07-10 PROCEDURE — 99499 UNLISTED E&M SERVICE: CPT | Mod: S$GLB,,, | Performed by: INTERNAL MEDICINE

## 2017-07-10 PROCEDURE — 1125F AMNT PAIN NOTED PAIN PRSNT: CPT | Mod: S$GLB,,, | Performed by: INTERNAL MEDICINE

## 2017-07-10 PROCEDURE — 1159F MED LIST DOCD IN RCRD: CPT | Mod: S$GLB,,, | Performed by: INTERNAL MEDICINE

## 2017-07-10 PROCEDURE — 1157F ADVNC CARE PLAN IN RCRD: CPT | Mod: S$GLB,,, | Performed by: INTERNAL MEDICINE

## 2017-07-10 PROCEDURE — 99213 OFFICE O/P EST LOW 20 MIN: CPT | Mod: S$GLB,,, | Performed by: INTERNAL MEDICINE

## 2017-07-10 PROCEDURE — 99999 PR PBB SHADOW E&M-EST. PATIENT-LVL III: CPT | Mod: PBBFAC,,, | Performed by: INTERNAL MEDICINE

## 2017-07-17 ENCOUNTER — HOSPITAL ENCOUNTER (OUTPATIENT)
Dept: RADIOLOGY | Facility: OTHER | Age: 72
Discharge: HOME | End: 2017-07-17
Attending: OBSTETRICS & GYNECOLOGY
Payer: MEDICARE

## 2017-07-17 DIAGNOSIS — N94.9 ADNEXAL CYST: ICD-10-CM

## 2017-07-17 PROCEDURE — 76856 US EXAM PELVIC COMPLETE: CPT | Mod: TC

## 2017-07-17 PROCEDURE — 76830 TRANSVAGINAL US NON-OB: CPT | Mod: 26,,, | Performed by: RADIOLOGY

## 2017-07-17 PROCEDURE — 76856 US EXAM PELVIC COMPLETE: CPT | Mod: 26,,, | Performed by: RADIOLOGY

## 2017-07-18 ENCOUNTER — TELEPHONE (OUTPATIENT)
Dept: UROGYNECOLOGY | Facility: CLINIC | Age: 72
End: 2017-07-18

## 2017-07-20 ENCOUNTER — OFFICE VISIT (OUTPATIENT)
Dept: UROGYNECOLOGY | Facility: CLINIC | Age: 72
End: 2017-07-20
Payer: MEDICARE

## 2017-07-20 VITALS
BODY MASS INDEX: 26.19 KG/M2 | DIASTOLIC BLOOD PRESSURE: 70 MMHG | SYSTOLIC BLOOD PRESSURE: 124 MMHG | WEIGHT: 133.38 LBS | HEIGHT: 60 IN

## 2017-07-20 DIAGNOSIS — N94.9 ADNEXAL CYST: Primary | ICD-10-CM

## 2017-07-20 DIAGNOSIS — Z98.890 HISTORY OF UROLOGIC SURGERY: ICD-10-CM

## 2017-07-20 PROCEDURE — 1126F AMNT PAIN NOTED NONE PRSNT: CPT | Mod: S$GLB,,, | Performed by: OBSTETRICS & GYNECOLOGY

## 2017-07-20 PROCEDURE — 99999 PR PBB SHADOW E&M-EST. PATIENT-LVL III: CPT | Mod: PBBFAC,,, | Performed by: OBSTETRICS & GYNECOLOGY

## 2017-07-20 PROCEDURE — 1159F MED LIST DOCD IN RCRD: CPT | Mod: S$GLB,,, | Performed by: OBSTETRICS & GYNECOLOGY

## 2017-07-20 PROCEDURE — 1157F ADVNC CARE PLAN IN RCRD: CPT | Mod: S$GLB,,, | Performed by: OBSTETRICS & GYNECOLOGY

## 2017-07-20 PROCEDURE — 99213 OFFICE O/P EST LOW 20 MIN: CPT | Mod: S$GLB,,, | Performed by: OBSTETRICS & GYNECOLOGY

## 2017-07-24 DIAGNOSIS — D64.9 ANEMIA: Primary | ICD-10-CM

## 2017-07-25 DIAGNOSIS — E11.69 CONTROLLED TYPE 2 DIABETES MELLITUS WITH OTHER SPECIFIED COMPLICATION, UNSPECIFIED LONG TERM INSULIN USE STATUS: ICD-10-CM

## 2017-07-25 NOTE — TELEPHONE ENCOUNTER
----- Message from Tiffanie Zuleta sent at 7/25/2017 12:40 PM CDT -----  Contact: geovani  Pt needs refill on Accu chex  smart view test strips. Pls fax to [employer] [phone]. Thanks.....DONNA

## 2017-07-26 ENCOUNTER — INFUSION (OUTPATIENT)
Dept: INFUSION THERAPY | Facility: HOSPITAL | Age: 72
End: 2017-07-26
Attending: INTERNAL MEDICINE
Payer: MEDICARE

## 2017-07-26 VITALS — RESPIRATION RATE: 16 BRPM | DIASTOLIC BLOOD PRESSURE: 74 MMHG | SYSTOLIC BLOOD PRESSURE: 143 MMHG | HEART RATE: 89 BPM

## 2017-07-26 DIAGNOSIS — Z53.1 REFUSAL OF BLOOD TRANSFUSIONS AS PATIENT IS JEHOVAH'S WITNESS: ICD-10-CM

## 2017-07-26 DIAGNOSIS — D63.1 ANEMIA IN CKD (CHRONIC KIDNEY DISEASE): Primary | ICD-10-CM

## 2017-07-26 DIAGNOSIS — N18.9 ANEMIA IN CKD (CHRONIC KIDNEY DISEASE): Primary | ICD-10-CM

## 2017-07-26 PROCEDURE — 63600175 PHARM REV CODE 636 W HCPCS: Performed by: INTERNAL MEDICINE

## 2017-07-26 PROCEDURE — 96372 THER/PROPH/DIAG INJ SC/IM: CPT

## 2017-07-26 RX ADMIN — ERYTHROPOIETIN 10000 UNITS: 10000 INJECTION, SOLUTION INTRAVENOUS; SUBCUTANEOUS at 10:07

## 2017-07-31 ENCOUNTER — TELEPHONE (OUTPATIENT)
Dept: UROGYNECOLOGY | Facility: CLINIC | Age: 72
End: 2017-07-31

## 2017-07-31 ENCOUNTER — PATIENT MESSAGE (OUTPATIENT)
Dept: FAMILY MEDICINE | Facility: CLINIC | Age: 72
End: 2017-07-31

## 2017-07-31 ENCOUNTER — PATIENT MESSAGE (OUTPATIENT)
Dept: RHEUMATOLOGY | Facility: CLINIC | Age: 72
End: 2017-07-31

## 2017-07-31 DIAGNOSIS — I10 ESSENTIAL HYPERTENSION: Primary | ICD-10-CM

## 2017-07-31 DIAGNOSIS — N94.9 ADNEXAL FULLNESS: Primary | ICD-10-CM

## 2017-08-01 NOTE — PROGRESS NOTES
[facility]  [address] [id number]    Regino Lawrence  [id number]  1945      Regino Lawrence is a 71 y.o.  here for follow up doing well, no complaints of incontinence or prolapse.      PROCEDURE DATE: 2016     PROCEDURE: [place]'s Colpocleisis, Perineorrhaphy and Levatorplasty , Trans-oburator  sling, Cystoscopy    Past Medical History:   Diagnosis Date    Acid reflux     Allergy     Alopecia     Anemia     Anxiety     Arthritis     Back pain     Cataract     Chronic kidney disease     Depression     Diabetes mellitus, type 2     Eye injury as a child     k-abrasion  od    Hyperlipidemia     Hypertension     Hypothyroidism     Immune deficiency disorder     Immune disorder     Myalgia and myositis 2012    Osteoporosis     Polyneuropathy     Renal manifestation of secondary diabetes mellitus     Sarcoidosis     Ulcer     no cancer    Urinary incontinence        Past Surgical History:   Procedure Laterality Date    CARPAL TUNNEL RELEASE      Rt wrist    CATARACT EXTRACTION W/  INTRAOCULAR LENS IMPLANT Right 2015    Dr. Azevedo    CATARACT EXTRACTION W/  INTRAOCULAR LENS IMPLANT Left 2015    Dr. Azevedo     SECTION      CHOLECYSTECTOMY      TUBAL LIGATION       History since last visit: Denies pain, bleeding, or discharge.  Bladder issues: Denies GISSELLE.  Rare UUI if waits to use the restroom.   Bowel issues: Denies constipation or straining.    Current Outpatient Prescriptions   Medication Sig    amlodipine (NORVASC) 5 MG tablet Take 1 tablet (5 mg total) by mouth once daily.    blood-glucose meter kit Use as instructed    carvedilol (COREG) 12.5 MG tablet Take 1 tablet (12.5 mg total) by mouth once daily at 6am.    cetirizine (ZYRTEC) 10 MG tablet     cloNIDine (CATAPRES) 0.3 MG tablet Take 1 tablet (0.3 mg total) by mouth 3 (three) times daily.    cyclobenzaprine (FLEXERIL) 10 MG tablet Take 1 tablet (10 mg  total) by mouth 3 (three) times daily.    fenofibrate 160 MG Tab TAKE 1 TABLET ONE TIME DAILY    gabapentin (NEURONTIN) 400 MG capsule Take 1 capsule (400 mg total) by mouth 2 (two) times daily.    lancing device (ACCU-CHEK SOFTCLIX LANCET DEV) Misc Accu-chek FastClix kit    levothyroxine (SYNTHROID) 50 MCG tablet TAKE 1 TABLET (50 MCG TOTAL) BY MOUTH BEFORE BREAKFAST.    lisinopril (PRINIVIL,ZESTRIL) 20 MG tablet TAKE 1 TABLET ONE TIME DAILY    LUBRICANT EYE DROPS, GLYC-PG, 1-0.3 % Drop     metformin (GLUCOPHAGE) 1000 MG tablet Take 1 tablet (1,000 mg total) by mouth 2 (two) times daily with meals.    ondansetron (ZOFRAN) 8 MG tablet Take 1 tablet (8 mg total) by mouth every 12 (twelve) hours as needed for Nausea.    potassium chloride SA (K-DUR,KLOR-CON) 20 MEQ tablet Take 1 tablet (20 mEq total) by mouth 2 (two) times daily.    predniSONE (DELTASONE) 10 MG tablet Take 1 tablet (10 mg total) by mouth once daily. (Patient taking differently: Take 20 mg by mouth once daily. )    ACCU-CHEK FASTCLIX Kit USE AS DIRECTED.    ALCOHOL ANTISEPTIC PADS (ALCOHOL PREP PADS TOP)     blood sugar diagnostic Strp 1 each by Misc.(Non-Drug; Combo Route) route once daily.    calcium carbonate (OS-MALCOLM) 500 mg calcium (1,250 mg) tablet Take 1 tablet (500 mg total) by mouth 2 (two) times daily.    diazePAM (VALIUM) 5 MG tablet Take 1 tablet (5 mg total) by mouth every 6 (six) hours as needed for Anxiety.     No current facility-administered medications for this visit.      ROS:  As per HPI.      Exam  /70 (BP Location: Right arm, Patient Position: Sitting, BP Method: Manual)   Ht 5' (1.524 m)   Wt 60.5 kg (133 lb 6.1 oz)   LMP  (LMP Unknown)   BMI 26.05 kg/m²   General: alert and oriented, no acute distress  Respiratory: normal respiratory effort  Abd: soft, non-tender, non-distended    Pelvic  Ext. Genitalia: normal external genitalia. Normal bartholin's and skeens glands  Vagina: + atrophy. No discharge  noted.   Non-tender bladder base without palpable mass. S/p colpocleisis channels patent.  Well healed.   Urethra: no masses or tenderness  Urethral meatus: no lesions, caruncle or prolapse.    POP-Q:  No obvious prolapse,      TVS: 7/17/2017 Findings: The uterus measures 5.5 cm x 2.6 cm x 3.5 cm.The uterus is anteflexed. No uterine masses. The endometrium measures approximately 1 mm. The ovaries are not identified on this exam.  0.7 cm anechoic focus is identified in the left adnexa.  This focus does not have surrounding suspicious features and does not have increased vascularity.  No adnexal abnormalities identified    Impression  1. Adnexal cyst     2. History of urologic surgery            We reviewed the above issues and discussed options for short-term versus long-term management of her problems.       Plan:   1. Cystic structure within the left adnexa-- will repeat ultrasound 3 months       20 minutes were spent in face to face time with this patient  75 % of this time was spent in counseling and/or coordination of care    Meredith Becker DO  Female Pelvic Medicine and Reconstructive Surgery  [facility] [place], [place]

## 2017-08-02 ENCOUNTER — PATIENT MESSAGE (OUTPATIENT)
Dept: FAMILY MEDICINE | Facility: CLINIC | Age: 72
End: 2017-08-02

## 2017-08-02 RX ORDER — AMLODIPINE BESYLATE 10 MG/1
10 TABLET ORAL DAILY
Qty: 90 TABLET | Refills: 0 | Status: SHIPPED | OUTPATIENT
Start: 2017-08-02 | End: 2017-10-30 | Stop reason: SDUPTHER

## 2017-08-11 ENCOUNTER — LAB VISIT (OUTPATIENT)
Dept: LAB | Facility: HOSPITAL | Age: 72
End: 2017-08-11
Attending: INTERNAL MEDICINE
Payer: MEDICARE

## 2017-08-11 DIAGNOSIS — N18.9 ANEMIA IN CKD (CHRONIC KIDNEY DISEASE): ICD-10-CM

## 2017-08-11 DIAGNOSIS — D63.1 ANEMIA IN CKD (CHRONIC KIDNEY DISEASE): ICD-10-CM

## 2017-08-11 LAB
ERYTHROCYTE [DISTWIDTH] IN BLOOD BY AUTOMATED COUNT: 13.4 %
HCT VFR BLD AUTO: 34.6 %
HGB BLD-MCNC: 11 G/DL
MCH RBC QN AUTO: 31.6 PG
MCHC RBC AUTO-ENTMCNC: 31.8 G/DL
MCV RBC AUTO: 99 FL
NEUTROPHILS # BLD AUTO: 6.3 K/UL
PLATELET # BLD AUTO: 318 K/UL
PMV BLD AUTO: 8.9 FL
RBC # BLD AUTO: 3.48 M/UL
WBC # BLD AUTO: 8.66 K/UL

## 2017-08-11 PROCEDURE — 36415 COLL VENOUS BLD VENIPUNCTURE: CPT

## 2017-08-11 PROCEDURE — 85027 COMPLETE CBC AUTOMATED: CPT

## 2017-08-24 DIAGNOSIS — D64.9 ANEMIA: Primary | ICD-10-CM

## 2017-08-25 ENCOUNTER — INFUSION (OUTPATIENT)
Dept: INFUSION THERAPY | Facility: HOSPITAL | Age: 72
End: 2017-08-25
Attending: INTERNAL MEDICINE
Payer: MEDICARE

## 2017-08-25 VITALS — SYSTOLIC BLOOD PRESSURE: 127 MMHG | DIASTOLIC BLOOD PRESSURE: 71 MMHG | HEART RATE: 83 BPM

## 2017-08-25 DIAGNOSIS — N18.9 ANEMIA IN CKD (CHRONIC KIDNEY DISEASE): Primary | ICD-10-CM

## 2017-08-25 DIAGNOSIS — D63.1 ANEMIA IN CKD (CHRONIC KIDNEY DISEASE): Primary | ICD-10-CM

## 2017-08-25 DIAGNOSIS — Z53.1 REFUSAL OF BLOOD TRANSFUSIONS AS PATIENT IS JEHOVAH'S WITNESS: ICD-10-CM

## 2017-08-25 PROCEDURE — 63600175 PHARM REV CODE 636 W HCPCS: Performed by: FAMILY MEDICINE

## 2017-08-25 PROCEDURE — 96372 THER/PROPH/DIAG INJ SC/IM: CPT

## 2017-08-25 RX ADMIN — ERYTHROPOIETIN 10000 UNITS: 10000 INJECTION, SOLUTION INTRAVENOUS; SUBCUTANEOUS at 11:08

## 2017-08-25 NOTE — PLAN OF CARE
Problem: Patient Care Overview (Adult)  Goal: Individualization & Mutuality  Outcome: Ongoing (interventions implemented as appropriate)  Patient accompanied by caregiver. Using walker.

## 2017-08-25 NOTE — PLAN OF CARE
Problem: Patient Care Overview (Adult)  Goal: Plan of Care Review  Outcome: Ongoing (interventions implemented as appropriate)  Patient received Procrit injection. Tolerated well. No reactions noted during visit. VSS. Patient received discharge instructions and verbalized understanding.

## 2017-08-25 NOTE — PLAN OF CARE
Problem: Patient Care Overview (Adult)  Goal: Individualization & Mutuality  Outcome: Ongoing (interventions implemented as appropriate)  Patient instructed to report any changes including increased weakness, fatigue, dizziness, SOB, chest pain or any other symptoms to MD.

## 2017-08-29 DIAGNOSIS — E03.9 HYPOTHYROIDISM, UNSPECIFIED TYPE: ICD-10-CM

## 2017-08-29 RX ORDER — LEVOTHYROXINE SODIUM 50 UG/1
TABLET ORAL
Qty: 90 TABLET | Refills: 1 | Status: SHIPPED | OUTPATIENT
Start: 2017-08-29 | End: 2018-04-19 | Stop reason: SDUPTHER

## 2017-09-01 ENCOUNTER — OFFICE VISIT (OUTPATIENT)
Dept: FAMILY MEDICINE | Facility: CLINIC | Age: 72
End: 2017-09-01
Payer: MEDICARE

## 2017-09-01 VITALS
HEIGHT: 60 IN | HEART RATE: 97 BPM | TEMPERATURE: 98 F | BODY MASS INDEX: 26.44 KG/M2 | SYSTOLIC BLOOD PRESSURE: 128 MMHG | RESPIRATION RATE: 20 BRPM | OXYGEN SATURATION: 96 % | DIASTOLIC BLOOD PRESSURE: 72 MMHG | WEIGHT: 134.69 LBS

## 2017-09-01 DIAGNOSIS — I10 ESSENTIAL HYPERTENSION: Primary | Chronic | ICD-10-CM

## 2017-09-01 DIAGNOSIS — D50.9 IRON DEFICIENCY ANEMIA, UNSPECIFIED IRON DEFICIENCY ANEMIA TYPE: ICD-10-CM

## 2017-09-01 DIAGNOSIS — K59.00 CONSTIPATION, UNSPECIFIED CONSTIPATION TYPE: ICD-10-CM

## 2017-09-01 DIAGNOSIS — E11.8 CONTROLLED TYPE 2 DIABETES MELLITUS WITH COMPLICATION, WITHOUT LONG-TERM CURRENT USE OF INSULIN: Chronic | ICD-10-CM

## 2017-09-01 PROCEDURE — 3078F DIAST BP <80 MM HG: CPT | Mod: S$GLB,,, | Performed by: FAMILY MEDICINE

## 2017-09-01 PROCEDURE — 99999 PR PBB SHADOW E&M-EST. PATIENT-LVL V: CPT | Mod: PBBFAC,,, | Performed by: FAMILY MEDICINE

## 2017-09-01 PROCEDURE — 99499 UNLISTED E&M SERVICE: CPT | Mod: S$GLB,,, | Performed by: FAMILY MEDICINE

## 2017-09-01 PROCEDURE — 3008F BODY MASS INDEX DOCD: CPT | Mod: S$GLB,,, | Performed by: FAMILY MEDICINE

## 2017-09-01 PROCEDURE — 1125F AMNT PAIN NOTED PAIN PRSNT: CPT | Mod: S$GLB,,, | Performed by: FAMILY MEDICINE

## 2017-09-01 PROCEDURE — 1157F ADVNC CARE PLAN IN RCRD: CPT | Mod: S$GLB,,, | Performed by: FAMILY MEDICINE

## 2017-09-01 PROCEDURE — 99214 OFFICE O/P EST MOD 30 MIN: CPT | Mod: S$GLB,,, | Performed by: FAMILY MEDICINE

## 2017-09-01 PROCEDURE — 3044F HG A1C LEVEL LT 7.0%: CPT | Mod: S$GLB,,, | Performed by: FAMILY MEDICINE

## 2017-09-01 PROCEDURE — 3066F NEPHROPATHY DOC TX: CPT | Mod: S$GLB,,, | Performed by: FAMILY MEDICINE

## 2017-09-01 PROCEDURE — 1159F MED LIST DOCD IN RCRD: CPT | Mod: S$GLB,,, | Performed by: FAMILY MEDICINE

## 2017-09-01 PROCEDURE — 3074F SYST BP LT 130 MM HG: CPT | Mod: S$GLB,,, | Performed by: FAMILY MEDICINE

## 2017-09-01 RX ORDER — LUBIPROSTONE 24 UG/1
24 CAPSULE ORAL 2 TIMES DAILY WITH MEALS
Qty: 180 CAPSULE | Refills: 0 | Status: SHIPPED | OUTPATIENT
Start: 2017-09-01 | End: 2017-11-30

## 2017-09-01 RX ORDER — TRAMADOL HYDROCHLORIDE 50 MG/1
TABLET ORAL
COMMUNITY
Start: 2017-08-10 | End: 2017-11-01 | Stop reason: SDUPTHER

## 2017-09-01 NOTE — PROGRESS NOTES
Chief Complaint   Patient presents with    Diabetes    Follow-up       HPI  Regino Lawrence is a 71 y.o. female with multiple medical diagnoses as listed in the medical history and problem list that presents for follow-up for diabetes and hypertension. Her pressure has been stable. Since we changed her medications and she is not having weakness or dizziness. She is having pain in her hands and feet that has been relieved somewhat by increased prednisone.     She has been having constipation and has bloating. She takes a laxative twice weekly with senna. She is not having vomiting but does get nausea.    PAST MEDICAL HISTORY:  Past Medical History:   Diagnosis Date    Acid reflux     Allergy     Alopecia     Anemia     Anxiety     Arthritis     Back pain     Cataract     Chronic kidney disease     Depression     Diabetes mellitus, type 2     Eye injury as a child     k-abrasion  od    Hyperlipidemia     Hypertension     Hypothyroidism     Immune deficiency disorder     Immune disorder     Myalgia and myositis 2012    Osteoporosis     Polyneuropathy     Renal manifestation of secondary diabetes mellitus     Sarcoidosis     Ulcer     no cancer    Urinary incontinence        PAST SURGICAL HISTORY:  Past Surgical History:   Procedure Laterality Date    CARPAL TUNNEL RELEASE      Rt wrist    CATARACT EXTRACTION W/  INTRAOCULAR LENS IMPLANT Right 2015    Dr. Azevedo    CATARACT EXTRACTION W/  INTRAOCULAR LENS IMPLANT Left 2015    Dr. Azevedo     SECTION      CHOLECYSTECTOMY      TUBAL LIGATION         SOCIAL HISTORY:  Social History     Social History    Marital status:      Spouse name: N/A    Number of children: N/A    Years of education: N/A     Occupational History    Not on file.     Social History Main Topics    Smoking status: Never Smoker    Smokeless tobacco: Never Used    Alcohol use No    Drug use: No    Sexual activity: Yes      Partners: Male     Other Topics Concern    Not on file     Social History Narrative    No narrative on file       FAMILY HISTORY:  Family History   Problem Relation Age of Onset    Hypertension Mother     Cataracts Mother     No Known Problems Father     Hypertension Maternal Grandmother     Glaucoma Sister     Arthritis Sister     No Known Problems Brother     No Known Problems Maternal Aunt     No Known Problems Maternal Uncle     No Known Problems Paternal Aunt     No Known Problems Paternal Uncle     No Known Problems Maternal Grandfather     No Known Problems Paternal Grandmother     No Known Problems Paternal Grandfather     Kidney failure Sister     Hepatitis Sister     Cancer Sister      bone cancer     Immunodeficiency Sister     Lupus Neg Hx     Rheum arthritis Neg Hx     Amblyopia Neg Hx     Blindness Neg Hx     Diabetes Neg Hx     Macular degeneration Neg Hx     Retinal detachment Neg Hx     Strabismus Neg Hx     Stroke Neg Hx     Thyroid disease Neg Hx     Endometrial cancer Neg Hx     Vaginal cancer Neg Hx     Cervical cancer Neg Hx        ALLERGIES AND MEDICATIONS: updated and reviewed.  Review of patient's allergies indicates:   Allergen Reactions    Azathioprine Shortness Of Breath and Other (See Comments)     Fatigue     Current Outpatient Prescriptions   Medication Sig Dispense Refill    ACCU-CHEK FASTCLIX Kit USE AS DIRECTED. 1 each 0    ALCOHOL ANTISEPTIC PADS (ALCOHOL PREP PADS TOP)       amlodipine (NORVASC) 10 MG tablet Take 1 tablet (10 mg total) by mouth once daily. 90 tablet 0    blood sugar diagnostic Strp 1 each by Misc.(Non-Drug; Combo Route) route once daily. 100 strip 11    blood-glucose meter kit Use as instructed 1 each 0    calcium carbonate (OS-MALCOLM) 500 mg calcium (1,250 mg) tablet Take 1 tablet (500 mg total) by mouth 2 (two) times daily.  0    carvedilol (COREG) 12.5 MG tablet Take 1 tablet (12.5 mg total) by mouth once daily at 6am. 90  tablet 1    cetirizine (ZYRTEC) 10 MG tablet       cloNIDine (CATAPRES) 0.3 MG tablet Take 1 tablet (0.3 mg total) by mouth 3 (three) times daily. 270 tablet 1    cyclobenzaprine (FLEXERIL) 10 MG tablet Take 1 tablet (10 mg total) by mouth 3 (three) times daily. 180 tablet 1    diazePAM (VALIUM) 5 MG tablet Take 1 tablet (5 mg total) by mouth every 6 (six) hours as needed for Anxiety. 15 tablet 0    fenofibrate 160 MG Tab TAKE 1 TABLET ONE TIME DAILY 90 tablet 1    gabapentin (NEURONTIN) 400 MG capsule Take 1 capsule (400 mg total) by mouth 2 (two) times daily. 180 capsule 1    lancing device (ACCU-CHEK SOFTCLIX LANCET DEV) Misc Accu-chek FastClix kit 1 each 0    levothyroxine (SYNTHROID) 50 MCG tablet TAKE 1 TABLET (50 MCG TOTAL) BY MOUTH BEFORE BREAKFAST. 90 tablet 1    lisinopril (PRINIVIL,ZESTRIL) 20 MG tablet TAKE 1 TABLET ONE TIME DAILY 90 tablet 1    LUBRICANT EYE DROPS, GLYC-PG, 1-0.3 % Drop       metformin (GLUCOPHAGE) 1000 MG tablet Take 1 tablet (1,000 mg total) by mouth 2 (two) times daily with meals. 180 tablet 1    ondansetron (ZOFRAN) 8 MG tablet Take 1 tablet (8 mg total) by mouth every 12 (twelve) hours as needed for Nausea. 25 tablet 1    potassium chloride SA (K-DUR,KLOR-CON) 20 MEQ tablet Take 1 tablet (20 mEq total) by mouth 2 (two) times daily. 180 tablet 1    predniSONE (DELTASONE) 10 MG tablet Take 1 tablet (10 mg total) by mouth once daily. (Patient taking differently: Take 20 mg by mouth once daily. ) 90 tablet 1    tramadol (ULTRAM) 50 mg tablet       lubiprostone (AMITIZA) 24 MCG Cap Take 1 capsule (24 mcg total) by mouth 2 (two) times daily with meals. 180 capsule 0     No current facility-administered medications for this visit.        ROS  Review of Systems   Constitutional: Negative for activity change, chills, diaphoresis, fatigue, fever and unexpected weight change.   HENT: Positive for rhinorrhea. Negative for hearing loss, sinus pressure, sore throat, tinnitus and  trouble swallowing.    Eyes: Positive for discharge. Negative for photophobia and visual disturbance.   Respiratory: Negative for cough, chest tightness, shortness of breath and wheezing.    Cardiovascular: Negative for chest pain and palpitations.   Gastrointestinal: Positive for constipation and nausea. Negative for abdominal pain, blood in stool, diarrhea and vomiting.   Endocrine: Negative for polydipsia and polyuria.   Genitourinary: Negative for difficulty urinating, dysuria, flank pain, frequency, hematuria, menstrual problem and vaginal discharge.   Musculoskeletal: Positive for arthralgias, joint swelling and neck pain.   Skin: Negative for rash.   Neurological: Positive for weakness. Negative for speech difficulty, light-headedness and headaches.   Psychiatric/Behavioral: Negative for behavioral problems, confusion and dysphoric mood.       Physical Exam  Vitals:    09/01/17 1012   BP: 128/72   Pulse: 97   Resp: 20   Temp: 97.8 °F (36.6 °C)   TempSrc: Oral   SpO2: 96%   Weight: 61.1 kg (134 lb 11.2 oz)   Height: 5' (1.524 m)    Body mass index is 26.31 kg/m².  Weight: 61.1 kg (134 lb 11.2 oz)   Height: 5' (152.4 cm)     Physical Exam   Constitutional: She is oriented to person, place, and time. She appears well-developed and well-nourished. No distress.   HENT:   Head: Normocephalic and atraumatic.   Right Ear: Tympanic membrane normal.   Left Ear: Tympanic membrane normal.   Nose: Nose normal.   Mouth/Throat: No oropharyngeal exudate.   Eyes: EOM are normal.   Neck: Neck supple. No thyromegaly present.   Cardiovascular: Normal rate and regular rhythm.  Exam reveals no gallop and no friction rub.    No murmur heard.  Pulmonary/Chest: Effort normal and breath sounds normal. No respiratory distress. She has no wheezes. She has no rales.   Abdominal: Soft. Bowel sounds are normal. She exhibits distension. She exhibits no mass. There is no tenderness. There is no rebound and no guarding.   Lymphadenopathy:      She has no cervical adenopathy.   Neurological: She is alert and oriented to person, place, and time.   Skin: Skin is warm and dry. No rash noted.   Psychiatric: She has a normal mood and affect. Her behavior is normal.   Nursing note and vitals reviewed.      Health Maintenance       Date Due Completion Date    Influenza Vaccine 08/01/2017 10/18/2016    Hemoglobin A1c 12/30/2017 6/30/2017    Mammogram 04/18/2018 4/18/2017    Lipid Panel 05/08/2018 5/8/2017    Foot Exam 05/12/2018 5/12/2017 (Done)    Override on 5/12/2017: Done    Eye Exam 05/18/2018 5/18/2017    Urine Microalbumin 05/18/2018 5/18/2017    DEXA SCAN 05/05/2020 5/5/2017    Colonoscopy 02/09/2025 2/9/2015 (Done)    Override on 2/9/2015: Done    Override on 2/18/2005: Done (reportedly normal)    TETANUS VACCINE 05/16/2026 5/16/2016            ASSESSMENT     1. Essential hypertension    2. Iron deficiency anemia, unspecified iron deficiency anemia type    3. Controlled type 2 diabetes mellitus with complication, without long-term current use of insulin    4. Constipation, unspecified constipation type        PLAN:     Essential hypertension  -stable on current regimen    Iron deficiency anemia, unspecified iron deficiency anemia type  -continue injections with hematology    Controlled type 2 diabetes mellitus with complication, without long-term current use of insulin  -stable on current regimen    Constipation, unspecified constipation type  -likely opioid induced, will try amitiza if insurance will cover  -     lubiprostone (AMITIZA) 24 MCG Cap; Take 1 capsule (24 mcg total) by mouth 2 (two) times daily with meals.  Dispense: 180 capsule; Refill: 0          Micaela Mendoza MD  09/01/2017 10:45 AM        Return in about 3 months (around 12/1/2017) for Follow up.

## 2017-09-06 ENCOUNTER — PATIENT MESSAGE (OUTPATIENT)
Dept: FAMILY MEDICINE | Facility: CLINIC | Age: 72
End: 2017-09-06

## 2017-09-08 ENCOUNTER — LAB VISIT (OUTPATIENT)
Dept: LAB | Facility: HOSPITAL | Age: 72
End: 2017-09-08
Attending: INTERNAL MEDICINE
Payer: MEDICARE

## 2017-09-08 ENCOUNTER — OFFICE VISIT (OUTPATIENT)
Dept: RHEUMATOLOGY | Facility: CLINIC | Age: 72
End: 2017-09-08
Payer: MEDICARE

## 2017-09-08 ENCOUNTER — HOSPITAL ENCOUNTER (OUTPATIENT)
Dept: RADIOLOGY | Facility: HOSPITAL | Age: 72
Discharge: HOME | End: 2017-09-08
Attending: INTERNAL MEDICINE
Payer: MEDICARE

## 2017-09-08 VITALS
WEIGHT: 133.69 LBS | TEMPERATURE: 98 F | HEIGHT: 60 IN | HEART RATE: 107 BPM | SYSTOLIC BLOOD PRESSURE: 145 MMHG | DIASTOLIC BLOOD PRESSURE: 87 MMHG | BODY MASS INDEX: 26.25 KG/M2

## 2017-09-08 DIAGNOSIS — M85.80 OSTEOPENIA, UNSPECIFIED LOCATION: ICD-10-CM

## 2017-09-08 DIAGNOSIS — N18.9 ANEMIA DUE TO CHRONIC KIDNEY DISEASE: ICD-10-CM

## 2017-09-08 DIAGNOSIS — Z53.1 REFUSAL OF BLOOD TRANSFUSIONS AS PATIENT IS JEHOVAH'S WITNESS: ICD-10-CM

## 2017-09-08 DIAGNOSIS — M25.561 CHRONIC PAIN OF RIGHT KNEE: ICD-10-CM

## 2017-09-08 DIAGNOSIS — G72.9 MYOPATHY: ICD-10-CM

## 2017-09-08 DIAGNOSIS — D86.9 SARCOIDOSIS: Chronic | ICD-10-CM

## 2017-09-08 DIAGNOSIS — D63.1 ANEMIA DUE TO CHRONIC KIDNEY DISEASE: ICD-10-CM

## 2017-09-08 DIAGNOSIS — M79.641 PAIN IN BOTH HANDS: ICD-10-CM

## 2017-09-08 DIAGNOSIS — Z79.52 CURRENT USE OF STEROID MEDICATION: ICD-10-CM

## 2017-09-08 DIAGNOSIS — G89.29 CHRONIC PAIN OF RIGHT KNEE: ICD-10-CM

## 2017-09-08 DIAGNOSIS — D86.9 SARCOIDOSIS: ICD-10-CM

## 2017-09-08 DIAGNOSIS — D86.9 SARCOIDOSIS: Primary | Chronic | ICD-10-CM

## 2017-09-08 DIAGNOSIS — D84.9 IMMUNOSUPPRESSION: Chronic | ICD-10-CM

## 2017-09-08 DIAGNOSIS — M79.642 PAIN IN BOTH HANDS: ICD-10-CM

## 2017-09-08 DIAGNOSIS — M79.10 MYALGIA: ICD-10-CM

## 2017-09-08 LAB
ALBUMIN SERPL BCP-MCNC: 3.3 G/DL
ALP SERPL-CCNC: 50 U/L
ALT SERPL W/O P-5'-P-CCNC: 13 U/L
ANION GAP SERPL CALC-SCNC: 11 MMOL/L
AST SERPL-CCNC: 10 U/L
BASOPHILS # BLD AUTO: 0.01 K/UL
BASOPHILS NFR BLD: 0.1 %
BILIRUB SERPL-MCNC: 0.3 MG/DL
BUN SERPL-MCNC: 11 MG/DL
CALCIUM SERPL-MCNC: 9.2 MG/DL
CHLORIDE SERPL-SCNC: 103 MMOL/L
CK SERPL-CCNC: 68 U/L
CO2 SERPL-SCNC: 25 MMOL/L
CREAT SERPL-MCNC: 1 MG/DL
CRP SERPL-MCNC: 10.9 MG/L
DIFFERENTIAL METHOD: ABNORMAL
EOSINOPHIL # BLD AUTO: 0 K/UL
EOSINOPHIL NFR BLD: 0.1 %
ERYTHROCYTE [DISTWIDTH] IN BLOOD BY AUTOMATED COUNT: 13.3 %
ERYTHROCYTE [SEDIMENTATION RATE] IN BLOOD BY WESTERGREN METHOD: 17 MM/HR
EST. GFR  (AFRICAN AMERICAN): >60 ML/MIN/1.73 M^2
EST. GFR  (NON AFRICAN AMERICAN): 57 ML/MIN/1.73 M^2
FERRITIN SERPL-MCNC: 118 NG/ML
GLUCOSE SERPL-MCNC: 229 MG/DL
HCT VFR BLD AUTO: 34.2 %
HGB BLD-MCNC: 11.1 G/DL
IRON SERPL-MCNC: 62 UG/DL
LYMPHOCYTES # BLD AUTO: 1.2 K/UL
LYMPHOCYTES NFR BLD: 16.8 %
MCH RBC QN AUTO: 31.8 PG
MCHC RBC AUTO-ENTMCNC: 32.5 G/DL
MCV RBC AUTO: 98 FL
MONOCYTES # BLD AUTO: 0.6 K/UL
MONOCYTES NFR BLD: 8.5 %
NEUTROPHILS # BLD AUTO: 5.5 K/UL
NEUTROPHILS NFR BLD: 73.7 %
PLATELET # BLD AUTO: 310 K/UL
PMV BLD AUTO: 8.8 FL
POTASSIUM SERPL-SCNC: 3.9 MMOL/L
PROT SERPL-MCNC: 6.6 G/DL
RBC # BLD AUTO: 3.49 M/UL
SATURATED IRON: 18 %
SODIUM SERPL-SCNC: 139 MMOL/L
TOTAL IRON BINDING CAPACITY: 352 UG/DL
TRANSFERRIN SERPL-MCNC: 238 MG/DL
WBC # BLD AUTO: 7.4 K/UL

## 2017-09-08 PROCEDURE — 3008F BODY MASS INDEX DOCD: CPT | Mod: S$GLB,,, | Performed by: INTERNAL MEDICINE

## 2017-09-08 PROCEDURE — 82728 ASSAY OF FERRITIN: CPT

## 2017-09-08 PROCEDURE — 36415 COLL VENOUS BLD VENIPUNCTURE: CPT

## 2017-09-08 PROCEDURE — 77077 JOINT SURVEY SINGLE VIEW: CPT | Mod: 26,,, | Performed by: RADIOLOGY

## 2017-09-08 PROCEDURE — 82085 ASSAY OF ALDOLASE: CPT

## 2017-09-08 PROCEDURE — 85025 COMPLETE CBC W/AUTO DIFF WBC: CPT

## 2017-09-08 PROCEDURE — 1125F AMNT PAIN NOTED PAIN PRSNT: CPT | Mod: S$GLB,,, | Performed by: INTERNAL MEDICINE

## 2017-09-08 PROCEDURE — 3079F DIAST BP 80-89 MM HG: CPT | Mod: S$GLB,,, | Performed by: INTERNAL MEDICINE

## 2017-09-08 PROCEDURE — 99999 PR PBB SHADOW E&M-EST. PATIENT-LVL III: CPT | Mod: PBBFAC,,, | Performed by: INTERNAL MEDICINE

## 2017-09-08 PROCEDURE — 85651 RBC SED RATE NONAUTOMATED: CPT

## 2017-09-08 PROCEDURE — 77077 JOINT SURVEY SINGLE VIEW: CPT | Mod: TC

## 2017-09-08 PROCEDURE — 1157F ADVNC CARE PLAN IN RCRD: CPT | Mod: S$GLB,,, | Performed by: INTERNAL MEDICINE

## 2017-09-08 PROCEDURE — 99499 UNLISTED E&M SERVICE: CPT | Mod: S$GLB,,, | Performed by: INTERNAL MEDICINE

## 2017-09-08 PROCEDURE — 86140 C-REACTIVE PROTEIN: CPT

## 2017-09-08 PROCEDURE — 99214 OFFICE O/P EST MOD 30 MIN: CPT | Mod: S$GLB,,, | Performed by: INTERNAL MEDICINE

## 2017-09-08 PROCEDURE — 80053 COMPREHEN METABOLIC PANEL: CPT

## 2017-09-08 PROCEDURE — 83540 ASSAY OF IRON: CPT

## 2017-09-08 PROCEDURE — 1159F MED LIST DOCD IN RCRD: CPT | Mod: S$GLB,,, | Performed by: INTERNAL MEDICINE

## 2017-09-08 PROCEDURE — 3077F SYST BP >= 140 MM HG: CPT | Mod: S$GLB,,, | Performed by: INTERNAL MEDICINE

## 2017-09-08 PROCEDURE — 82550 ASSAY OF CK (CPK): CPT

## 2017-09-08 NOTE — PATIENT INSTRUCTIONS
1. Decrease prednisone 15 mg daily for one week then 10 mg daily.   2. Consider retrying Plaquenil versus leflunomide.    3. Decide about Prolia        Hydroxychloroquine tablets  What is this medicine?  HYDROXYCHLOROQUINE (modesta drox ee KLOR oh kwin) is used to treat rheumatoid arthritis and systemic lupus erythematosus. It is also used to treat malaria.  How should I use this medicine?  Take this medicine by mouth with a glass of water. Follow the directions on the prescription label. If this medicine upsets your stomach take it with food or milk. Take your doses at regular intervals. Do not take your medicine more often than directed.  Talk to your pediatrician regarding the use of this medicine in children. Special care may be needed.  What side effects may I notice from receiving this medicine?  Side effects that you should report to your doctor or health care professional as soon as possible:  · allergic reactions like skin rash, itching or hives, swelling of the face, lips, or tongue  · change in vision  · fever, infection  · hearing loss or ringing  · muscle weakness, tremor, or numbness  · redness, blistering, peeling or loosening of the skin, including inside the mouth  · seizures  · unusual bleeding or bruising  · unusually weak or tired  Side effects that usually do not require medical attention (report to your doctor or health care professional if they continue or are bothersome):  · change in coloration of the mouth or skin  · dizziness  · hair loss, lightening  · headache  · irritability, nervousness, nightmares  · loss of appetite  · stomach upset, diarrhea  What may interact with this medicine?  · antacids  · botulinum toxins  · digoxin  · kaolin  · penicillamine  What if I miss a dose?  If you miss a dose, take it as soon as you can. If it is almost time for your next dose, take only that dose. Do not take double or extra doses.  Where should I keep my medicine?  Keep out of the reach of children. In  children, this medicine can cause overdose with small doses.  Store at room temperature between 15 and 30 degrees C (59 and 86 degrees F). Protect from moisture and light. Throw away any unused medicine after the expiration date.  What should I tell my health care provider before I take this medicine?  They need to know if you have any of these conditions:  · alcoholism  · anemia or other blood disorder  · eye disease  · glucose 6-phosphate dehydrogenase (G6PD) deficiency  · liver disease  · porphyria  · psoriasis  · an unusual or allergic reaction to chloroquine, hydroxychloroquine, other medicines, foods, dyes, or preservatives  · pregnant or trying to get pregnant  · breast-feeding  What should I watch for while using this medicine?  Visit your doctor or health care professional for regular check ups. Tell your doctor if your symptoms do not improve. Arthritis symptoms may take several weeks to improve. If you are taking this medicine for a long time, you will need important blood work done. You will also need to have your eyes checked as directed.  This medicine can make you more sensitive to the sun. Keep out of the sun. If you cannot avoid being in the sun, wear protective clothing and use sunscreen. Do not use sun lamps or tanning beds/booths.  Avoid antacids and kaolin containing products for 2 hours before and after taking a dose of this medicine.  NOTE:This sheet is a summary. It may not cover all possible information. If you have questions about this medicine, talk to your doctor, pharmacist, or health care provider. Copyright© 2017 [employer]        Leflunomide tablets  What is this medicine?  LEFLUNOMIDE (le FLOO na mide) is for rheumatoid arthritis.  How should I use this medicine?  Take this medicine by mouth with a full glass of water. Follow the directions on the prescription label. Take your medicine at regular intervals. Do not take your medicine more often than directed. Do not stop taking  except on your doctor's advice.  Talk to your pediatrician regarding the use of this medicine in children. Special care may be needed.  What side effects may I notice from receiving this medicine?  Side effects that you should report to your doctor or health care professional as soon as possible:  · allergic reactions like skin rash, itching or hives, swelling of the face, lips, or tongue  · cough  · difficulty breathing or shortness of breath  · fever, chills or any other sign of infection  · redness, blistering, peeling or loosening of the skin, including inside the mouth  · unusual bleeding or bruising  · unusually weak or tired  · vomiting  · yellowing of eyes or skin  Side effects that usually do not require medical attention (report to your doctor or health care professional if they continue or are bothersome):  · diarrhea  · hair loss  · headache  · nausea  What may interact with this medicine?  Do not take this medicine with any of the following medications:  · teriflunomide  This medicine may also interact with the following medications:  · charcoal  · cholestyramine  · methotrexate  · NSAIDs, medicines for pain and inflammation, like ibuprofen or naproxen  · phenytoin  · rifampin  · tolbutamide  · vaccines  · warfarin  What if I miss a dose?  If you miss a dose, take it as soon as you can. If it is almost time for your next dose, take only that dose. Do not take double or extra doses.  Where should I keep my medicine?  Keep out of the reach of children.  Store at room temperature between 15 and 30 degrees C (59 and 86 degrees F). Protect from moisture and light. Throw away any unused medicine after the expiration date.  What should I tell my health care provider before I take this medicine?  They need to know if you have any of these conditions:  · alcoholism  · bone marrow problems  · fever or infection  · immune system problems  · kidney disease  · liver disease  · an unusual or allergic reaction to  leflunomide, teriflunomide, other medicines, lactose, foods, dyes, or preservatives  · pregnant or trying to get pregnant  · breast-feeding  What should I watch for while using this medicine?  Visit your doctor or health care professional for regular checks on your progress. You will need frequent blood checks while you are receiving the medicine.  If you get a cold or other infection while receiving this medicine, call your doctor or health care professional. Do not treat yourself. The medicine may increase your risk of getting an infection.  If you are a woman who has the potential to become pregnant, discuss birth control options with your doctor or health care professional. You must not be pregnant, and you must be using a reliable form of birth control. The medicine may harm an unborn baby. Immediately call your doctor if you think you might be pregnant.  Alcoholic drinks may increase possible damage to your liver. Do not drink alcohol while taking this medicine.  NOTE:This sheet is a summary. It may not cover all possible information. If you have questions about this medicine, talk to your doctor, pharmacist, or health care provider. Copyright© 2017 [employer]

## 2017-09-08 NOTE — Clinical Note
Mrs. Lawrence has sarcoidosis with muscle and joint involvement.  She has failed colchicine, methotrexate, Imuran, Cellcept and Plaquenil.  I would like to lower her prednisone by trying DMARD therapy with either leflunomide or retrial of Plaquenil.  Do you have any objections to either medication?

## 2017-09-08 NOTE — PROGRESS NOTES
Subjective:       Patient ID: Regino Lawrence is a 71 y.o. female.    Chief Complaint: Disease Management      HPI:  Regino Lawrence is a 71 y.o. female  with history of sarcoidosis with associated myopathy and   arthropathy. Sarcoidosis that was first manifested by muscle inflammation, low white   blood cell count, hair loss, skin involvement. She was treated in the   past with methotrexate and Plaquenil, both of which were ineffective.   Cellcept and imuran caused some unknown side effect (she thinks it made her sick).   Colchicine was held due to low WBC.   Although methotrexate did not help in past it was retried and she felt it helped hair growth but did not help body aches.   She held MTX due to an URI but patient has not wanted restarted since then (2013).       She s/p laminectomy-cervical fusion C3-C7 11/16/2015 for cervical spinal stenosis.     S/p surgery for bladder prolapse. Required Epogen before surgery and had 2 infusions after surgery.   Last Epogen today.    Reports 7 out of 7 days of pain in hands.   Pain is 4/10 toothache like pain in hands with swelling.  Right hand previously treated with carpal tunnel.  Pain is different than CTS.  Using hands worsen pain.  She is currently on prednisone 20 mg with some benefit.    Review of Systems   Constitutional: Positive for fatigue.   HENT: Negative.    Eyes: Negative.    Respiratory: Positive for shortness of breath (shortness of breath when her blood glucose drops).    Cardiovascular: Negative.    Gastrointestinal: Negative.    Endocrine: Negative.    Genitourinary: Negative.    Musculoskeletal: Positive for arthralgias and joint swelling.   Skin: Negative.    Allergic/Immunologic: Negative.    Neurological: Negative.    Hematological: Negative.    Psychiatric/Behavioral: Negative.          Objective:   BP (!) 145/87 (BP Location: Left arm, Patient Position: Sitting, BP Method: Small (Automatic))   Pulse 107   Temp 98.4 °F (36.9 °C) (Oral)   Ht 5'  (1.524 m)   Wt 60.6 kg (133 lb 11.2 oz)   LMP  (LMP Unknown)   BMI 26.11 kg/m²      Physical Exam   Constitutional: She is oriented to person, place, and time and well-developed, well-nourished, and in no distress.   HENT:   Head: Normocephalic and atraumatic.   Eyes: Conjunctivae and EOM are normal.   Cardiovascular: Normal rate.    Pulmonary/Chest: Effort normal and breath sounds normal.   Abdominal: Soft. Bowel sounds are normal.   Neurological: She is alert and oriented to person, place, and time.   Slow gait with cane   Skin: Skin is warm and dry.     Psychiatric: Mood and affect normal.   Musculoskeletal:   28 joint count: 3 swollen (right 1st-3rd MCP) and 0 tender  4.5/5 UE and LE strength proximally           LABS    Component      Latest Ref Rng & Units 8/25/2017 7/24/2017 5/8/2017   Sodium      136 - 145 mmol/L   140   Potassium      3.5 - 5.1 mmol/L   3.5   Chloride      95 - 110 mmol/L   103   CO2      23 - 29 mmol/L   26   Glucose      70 - 110 mg/dL   127 (H)   BUN, Bld      8 - 23 mg/dL   21   Creatinine      0.5 - 1.4 mg/dL   1.2   Calcium      8.7 - 10.5 mg/dL   9.8   Total Protein      6.0 - 8.4 g/dL   7.5   Albumin      3.5 - 5.2 g/dL   3.8   Total Bilirubin      0.1 - 1.0 mg/dL   0.4   Alkaline Phosphatase      55 - 135 U/L   50 (L)   AST      10 - 40 U/L   18   ALT      10 - 44 U/L   12   Anion Gap      8 - 16 mmol/L   11   eGFR if African American      >60 mL/min/1.73 m:2   53 (A)   eGFR if non African American      >60 mL/min/1.73 m:2   46 (A)   WBC      3.90 - 12.70 K/uL 8.34     RBC      4.00 - 5.40 M/uL 3.31 (L)     Hemoglobin      12.0 - 16.0 g/dL 10.7 (L)     Hematocrit      37.0 - 48.5 % 32.0 (L)     MCV      82 - 98 fL 97     MCH      27.0 - 31.0 pg 32.3 (H)     MCHC      32.0 - 36.0 g/dL 33.4     RDW      11.5 - 14.5 % 13.3     Platelets      150 - 350 K/uL 238     MPV      9.2 - 12.9 fL 8.7 (L)     Gran #      1.8 - 7.7 K/uL 5.1     Iron      30 - 160 ug/dL  77    Transferrin       200 - 375 mg/dL  256    TIBC      250 - 450 ug/dL  379    Saturated Iron      20 - 50 %  20    Aldolase      1.2 - 7.6 U/L   4.0   CPK      20 - 180 U/L   267 (H)   CRP      0.0 - 8.2 mg/L   5.5   Sed Rate      0 - 20 mm/Hr   37 (H)   Ferritin      20.0 - 300.0 ng/mL  137       Assessment:       1.   Sarcoidosis. Manifested by myopathy and arthropathy. Persistent joint pain and myalgias despite prednisone 20mg.     2.   Myalgia and myositis.    3.   Osteopenia. Took Fosamax for 5 years stopped 6/2013.    4.   Fatigue     5.   Diabetes mellitus type 2 in nonobese     6. Neck pain. X-ray with degenerative changes. S/p laminectomy-cervical fusion C3-C7 11/16/2015 for cervical spinal stenosis.    7. Back pain    8. HTN.    Plan:       1. Labs and arthritis survey  2. Decrease prednisone 15 mg daily for one week then 10 mg daily. Consider retrying Plaquenil versus leflunomide.   Plaquenil+Gabapentin lowers seizure threshold, Plaquenil can low blood glucose with metformin and Arava+Coreg lowers benefits of Coreg.   Discussed risk of AVN and other issues with prednisone.  Encouraged patient to try steroid sparing agent.  Message to hematology for input.   3. Continue tramadol for pain. Can take 3 times daily.   4. Following with nephrology  5. DEXA with osteopenia of hip total and femoral neck. FRAX does not suggest treatment however with prednisone>7.5 mg will consider Prolia (due renal insufficiency).  Information provided for patient to review.  She will let me know how she would like to proceed ASAP.              RTO in 3-4 months.    Patient seen face to face for 25 minutes and greater than 50% spent in counseling regarding Joint pains,   management of sarcoidosis and pain.

## 2017-09-11 ENCOUNTER — OFFICE VISIT (OUTPATIENT)
Dept: HEMATOLOGY/ONCOLOGY | Facility: CLINIC | Age: 72
End: 2017-09-11
Payer: MEDICARE

## 2017-09-11 ENCOUNTER — PATIENT MESSAGE (OUTPATIENT)
Dept: RHEUMATOLOGY | Facility: CLINIC | Age: 72
End: 2017-09-11

## 2017-09-11 VITALS
HEIGHT: 60 IN | TEMPERATURE: 98 F | SYSTOLIC BLOOD PRESSURE: 164 MMHG | OXYGEN SATURATION: 97 % | WEIGHT: 134.25 LBS | DIASTOLIC BLOOD PRESSURE: 82 MMHG | HEART RATE: 113 BPM | BODY MASS INDEX: 26.35 KG/M2

## 2017-09-11 DIAGNOSIS — D86.9 SARCOIDOSIS: ICD-10-CM

## 2017-09-11 DIAGNOSIS — D63.1 ANEMIA IN CHRONIC KIDNEY DISEASE, UNSPECIFIED CKD STAGE: Primary | ICD-10-CM

## 2017-09-11 DIAGNOSIS — M85.80 OSTEOPENIA, UNSPECIFIED LOCATION: ICD-10-CM

## 2017-09-11 DIAGNOSIS — N18.9 ANEMIA IN CHRONIC KIDNEY DISEASE, UNSPECIFIED CKD STAGE: Primary | ICD-10-CM

## 2017-09-11 LAB — ALDOLASE SERPL-CCNC: 1.4 U/L

## 2017-09-11 PROCEDURE — 3077F SYST BP >= 140 MM HG: CPT | Mod: S$GLB,,, | Performed by: INTERNAL MEDICINE

## 2017-09-11 PROCEDURE — 99213 OFFICE O/P EST LOW 20 MIN: CPT | Mod: S$GLB,,, | Performed by: INTERNAL MEDICINE

## 2017-09-11 PROCEDURE — 3008F BODY MASS INDEX DOCD: CPT | Mod: S$GLB,,, | Performed by: INTERNAL MEDICINE

## 2017-09-11 PROCEDURE — 1159F MED LIST DOCD IN RCRD: CPT | Mod: S$GLB,,, | Performed by: INTERNAL MEDICINE

## 2017-09-11 PROCEDURE — 3079F DIAST BP 80-89 MM HG: CPT | Mod: S$GLB,,, | Performed by: INTERNAL MEDICINE

## 2017-09-11 PROCEDURE — 1157F ADVNC CARE PLAN IN RCRD: CPT | Mod: S$GLB,,, | Performed by: INTERNAL MEDICINE

## 2017-09-11 PROCEDURE — 99499 UNLISTED E&M SERVICE: CPT | Mod: S$GLB,,, | Performed by: INTERNAL MEDICINE

## 2017-09-11 PROCEDURE — 1126F AMNT PAIN NOTED NONE PRSNT: CPT | Mod: S$GLB,,, | Performed by: INTERNAL MEDICINE

## 2017-09-11 PROCEDURE — 99999 PR PBB SHADOW E&M-EST. PATIENT-LVL V: CPT | Mod: PBBFAC,,, | Performed by: INTERNAL MEDICINE

## 2017-09-11 NOTE — PROGRESS NOTES
Subjective:       Patient ID: Regino Lawrence is a 71 y.o. female.    Chief Complaint: Follow-up  Diagnosis: Anemia in CKD  Patient is a [employer]  .  HPI    The patient is seen today for chronic anemia in CKD. The patient reports that she has been diagnosed with JOLLY in the past.  She has been on oral iron supplementation therapy, but could not tolerate or did not respond, she is uncertain.  She is followed by GI and has undergone a colonoscopy earlier this year and was diagnosed with hemorrhoids for which she underwent banding procedure.  No melena, hematochezia,change in bowel habits.  She has also been diagnosed with B12 deficiency in the past, but reports she did not respond to B12 injections. No history of blood transfusions.  She is a [employer].  She reports that she remembers getting injections in the 1970s when her blood count was low.        She is followed by Rheumatology for history of sarcoidosis with associated myopathy and arthropathy.   .She has been treated in the  past with methotrexate and Plaquenil, both of which were ineffective  Cellcept and imuran caused some unknown side effect.   Colchicine  held due to low WBC    Today, she has no new issues  She continues swelling in  hands and diffuse arthralgias  She continues with chronic LBP- stable.   She remains on chronic steroid therapy   She is taking Pred 15 md daily       She continues to undergo Procrit therapy q 2wks  She undergoes intermittent IV iron therapy    No fatigue  No SOB/CP/NV   No recent infections      CBC reveals wbc 7400/mm3  Hb 11.1  g/dl Hct 34.2   % Plt ct 310k    PAST MEDICAL HISTORY:  Acid reflux, alopecia, anemia, anxiety disorder, chronic  kidney disease, depression, diabetes mellitus type 2, hyperlipidemia,  hypertension, hypothyroidism, osteoporosis, sarcoidosis.    PAST SURGICAL HISTORY:  Cholecystectomy, , tubal ligation, carpal tunnel release, cataracts.    FAMILY HISTORY: Unremarkable for  cancer. Significant for HTN.       Review of Systems   Constitutional: Negative for activity change, appetite change and fatigue.   HENT: Negative for hearing loss and nosebleeds.    Eyes: Negative for visual disturbance.   Respiratory: Negative for cough and shortness of breath.    Cardiovascular: Negative for chest pain and leg swelling.   Gastrointestinal: Negative for abdominal pain, constipation, diarrhea and nausea.   Genitourinary: Negative for flank pain and urgency.   Musculoskeletal: Positive for arthralgias, back pain, gait problem and joint swelling.   Skin: Negative for rash.        No petechiae, ecchymoses   Neurological: Negative for light-headedness and headaches.   Hematological: Negative for adenopathy. Does not bruise/bleed easily.       Objective:       Vitals:    09/11/17 1040 09/11/17 1043   BP: (!) 169/82 (!) 164/82   BP Location: Right arm Right arm   Patient Position: Sitting Sitting   BP Method: Medium (Automatic) Medium (Manual)   Pulse: (!) 116 (!) 113   Temp: 98.4 °F (36.9 °C)    TempSrc: Oral    SpO2: 96% 97%   Weight: 60.9 kg (134 lb 4.2 oz)    Height: 5' (1.524 m)        Physical Exam   Constitutional: She is oriented to person, place, and time. She appears well-developed and well-nourished.   HENT:   Head: Normocephalic.   Mouth/Throat: Oropharynx is clear and moist. No oropharyngeal exudate.   Eyes: Conjunctivae and lids are normal. Pupils are equal, round, and reactive to light. No scleral icterus.   Neck: Normal range of motion. Neck supple. No thyromegaly present.   Cardiovascular: Normal rate, regular rhythm and normal heart sounds.    No murmur heard.  Pulmonary/Chest: Breath sounds normal. She has no wheezes. She has no rales.   Abdominal: Soft. Bowel sounds are normal. She exhibits no distension and no mass. There is no hepatosplenomegaly. There is no tenderness. There is no rebound and no guarding.   Musculoskeletal: Normal range of motion. She exhibits no edema or  tenderness.   Lymphadenopathy:     She has no cervical adenopathy.     She has no axillary adenopathy.        Right: No supraclavicular adenopathy present.        Left: No supraclavicular adenopathy present.   Neurological: She is alert and oriented to person, place, and time. No cranial nerve deficit. Coordination normal.   Skin: Skin is warm and dry. No ecchymosis, no petechiae and no rash noted. No erythema.   Psychiatric: She has a normal mood and affect.         Results for CANDELARIA SANTANA (MRN [id number]) as of 8/28/2016 14:24   Ref. Range 10/14/2015 08:18 12/8/2015 11:33 3/7/2016 12:08   Vitamin B-12 Latest Ref Range: 210 - 950 pg/mL 437 651 850       Results for CANDELARIA SANTANA (MRN [id number]) as of 9/22/2016 10:20   Ref. Range 8/17/2016 14:30   Retic Latest Ref Range: 0.5 - 2.5 % 2.8 (H)   Sed Rate Latest Ref Range: 0 - 20 mm/Hr 31 (H)     Lab Results   Component Value Date    WBC 7.40 09/08/2017    HGB 11.1 (L) 09/08/2017    HCT 34.2 (L) 09/08/2017    MCV 98 09/08/2017     09/08/2017     Lab Results   Component Value Date    IRON 62 09/08/2017    TIBC 352 09/08/2017    FERRITIN 118 09/08/2017     SPEP-nl          CT renal 3/6/2017   IMPRESSION:  1.  No renal, ureteral or bladder calculi.  No hydronephrosis or ureterectasis.  2.  Poorly distended bladder.  Mild bladder wall prominence.  Mild cystitis cannot be   excluded.  3.  Moderate constipation.  Normal appendix      Lab Results   Component Value Date    IRON 62 09/08/2017    TIBC 352 09/08/2017    FERRITIN 118 09/08/2017       Assessment:       1. Anemia in chronic kidney disease, unspecified CKD stage    2. Sarcoidosis    3. Osteopenia, unspecified location        Plan:   1 Pt clinically stable  Hb 11 g/dl   Ferritin 118  Pt is a [employer] and declines/not interested in blood and blood products due to Hinduism beliefs  Cont  Procrit therapy  q 2wks ( pending lab parameters)  F/u with Rheumatology  Cont Ca and Vit D   Pt being considered  for Prolia      CBC q 2wks      F/u 2 mos with Fe studies        CC: Micaela Mendoza M.D.

## 2017-09-22 ENCOUNTER — INFUSION (OUTPATIENT)
Dept: INFUSION THERAPY | Facility: HOSPITAL | Age: 72
End: 2017-09-22
Attending: INTERNAL MEDICINE
Payer: MEDICARE

## 2017-09-22 VITALS — HEART RATE: 95 BPM | SYSTOLIC BLOOD PRESSURE: 129 MMHG | RESPIRATION RATE: 16 BRPM | DIASTOLIC BLOOD PRESSURE: 71 MMHG

## 2017-09-22 DIAGNOSIS — Z53.1 REFUSAL OF BLOOD TRANSFUSIONS AS PATIENT IS JEHOVAH'S WITNESS: ICD-10-CM

## 2017-09-22 DIAGNOSIS — D63.1 ANEMIA IN CKD (CHRONIC KIDNEY DISEASE): Primary | ICD-10-CM

## 2017-09-22 DIAGNOSIS — N18.9 ANEMIA IN CKD (CHRONIC KIDNEY DISEASE): Primary | ICD-10-CM

## 2017-09-22 PROCEDURE — 63600175 PHARM REV CODE 636 W HCPCS: Performed by: INTERNAL MEDICINE

## 2017-09-22 PROCEDURE — 96372 THER/PROPH/DIAG INJ SC/IM: CPT

## 2017-09-22 RX ADMIN — ERYTHROPOIETIN 10000 UNITS: 10000 INJECTION, SOLUTION INTRAVENOUS; SUBCUTANEOUS at 10:09

## 2017-09-22 NOTE — PLAN OF CARE
Problem: Patient Care Overview (Adult)  Goal: Plan of Care Review  Outcome: Ongoing (interventions implemented as appropriate)  Patient received Procrit. Tolerated well. No reactions noted during visit. VSS. Patient received discharge instructions and verbalized understanding.

## 2017-10-06 ENCOUNTER — INFUSION (OUTPATIENT)
Dept: INFUSION THERAPY | Facility: HOSPITAL | Age: 72
End: 2017-10-06
Attending: INTERNAL MEDICINE
Payer: MEDICARE

## 2017-10-06 VITALS — SYSTOLIC BLOOD PRESSURE: 118 MMHG | HEART RATE: 97 BPM | RESPIRATION RATE: 16 BRPM | DIASTOLIC BLOOD PRESSURE: 60 MMHG

## 2017-10-06 DIAGNOSIS — D63.1 ANEMIA IN CKD (CHRONIC KIDNEY DISEASE): Primary | ICD-10-CM

## 2017-10-06 DIAGNOSIS — N18.9 ANEMIA IN CKD (CHRONIC KIDNEY DISEASE): Primary | ICD-10-CM

## 2017-10-06 DIAGNOSIS — Z53.1 REFUSAL OF BLOOD TRANSFUSIONS AS PATIENT IS JEHOVAH'S WITNESS: ICD-10-CM

## 2017-10-06 PROCEDURE — 96372 THER/PROPH/DIAG INJ SC/IM: CPT

## 2017-10-06 PROCEDURE — 63600175 PHARM REV CODE 636 W HCPCS: Performed by: INTERNAL MEDICINE

## 2017-10-06 RX ADMIN — ERYTHROPOIETIN 10000 UNITS: 10000 INJECTION, SOLUTION INTRAVENOUS; SUBCUTANEOUS at 10:10

## 2017-10-10 DIAGNOSIS — E11.8 CONTROLLED DIABETES MELLITUS TYPE 2 WITH COMPLICATIONS, UNSPECIFIED LONG TERM INSULIN USE STATUS: ICD-10-CM

## 2017-10-10 DIAGNOSIS — E11.22 DIABETES MELLITUS WITH STAGE 3 CHRONIC KIDNEY DISEASE: Chronic | ICD-10-CM

## 2017-10-10 DIAGNOSIS — N18.30 DIABETES MELLITUS WITH STAGE 3 CHRONIC KIDNEY DISEASE: Chronic | ICD-10-CM

## 2017-10-10 RX ORDER — METFORMIN HYDROCHLORIDE 1000 MG/1
1000 TABLET ORAL 2 TIMES DAILY WITH MEALS
Qty: 180 TABLET | Refills: 1 | Status: SHIPPED | OUTPATIENT
Start: 2017-10-10 | End: 2017-12-19 | Stop reason: SDUPTHER

## 2017-10-10 NOTE — TELEPHONE ENCOUNTER
----- Message from Tiffanie Zuleta sent at 10/10/2017  8:22 AM CDT -----  Contact: self  [phone]  Pt is requesting a refill on her Metformin ASAP she is out, Pl's call [facility] [phone]. Thanks.....Jeanine

## 2017-10-20 ENCOUNTER — INFUSION (OUTPATIENT)
Dept: INFUSION THERAPY | Facility: HOSPITAL | Age: 72
End: 2017-10-20
Attending: INTERNAL MEDICINE
Payer: MEDICARE

## 2017-10-20 VITALS — DIASTOLIC BLOOD PRESSURE: 77 MMHG | HEART RATE: 88 BPM | SYSTOLIC BLOOD PRESSURE: 131 MMHG | RESPIRATION RATE: 16 BRPM

## 2017-10-20 DIAGNOSIS — Z53.1 REFUSAL OF BLOOD TRANSFUSIONS AS PATIENT IS JEHOVAH'S WITNESS: ICD-10-CM

## 2017-10-20 DIAGNOSIS — N18.9 ANEMIA IN CKD (CHRONIC KIDNEY DISEASE): Primary | ICD-10-CM

## 2017-10-20 DIAGNOSIS — D63.1 ANEMIA IN CKD (CHRONIC KIDNEY DISEASE): Primary | ICD-10-CM

## 2017-10-20 PROCEDURE — 96372 THER/PROPH/DIAG INJ SC/IM: CPT

## 2017-10-20 PROCEDURE — 63600175 PHARM REV CODE 636 W HCPCS: Performed by: INTERNAL MEDICINE

## 2017-10-20 RX ADMIN — ERYTHROPOIETIN 10000 UNITS: 10000 INJECTION, SOLUTION INTRAVENOUS; SUBCUTANEOUS at 11:10

## 2017-10-30 ENCOUNTER — PATIENT MESSAGE (OUTPATIENT)
Dept: RHEUMATOLOGY | Facility: CLINIC | Age: 72
End: 2017-10-30

## 2017-10-30 DIAGNOSIS — N18.30 DIABETES MELLITUS WITH STAGE 3 CHRONIC KIDNEY DISEASE: Chronic | ICD-10-CM

## 2017-10-30 DIAGNOSIS — E11.22 DIABETES MELLITUS WITH STAGE 3 CHRONIC KIDNEY DISEASE: Chronic | ICD-10-CM

## 2017-10-30 DIAGNOSIS — I10 ESSENTIAL HYPERTENSION: ICD-10-CM

## 2017-10-30 RX ORDER — LISINOPRIL 20 MG/1
TABLET ORAL
Qty: 90 TABLET | Refills: 1 | Status: SHIPPED | OUTPATIENT
Start: 2017-10-30 | End: 2017-12-05 | Stop reason: SDUPTHER

## 2017-10-30 RX ORDER — AMLODIPINE BESYLATE 10 MG/1
10 TABLET ORAL DAILY
Qty: 90 TABLET | Refills: 1 | Status: SHIPPED | OUTPATIENT
Start: 2017-10-30 | End: 2017-11-28 | Stop reason: SDUPTHER

## 2017-11-01 RX ORDER — TRAMADOL HYDROCHLORIDE 50 MG/1
TABLET ORAL
Qty: 90 TABLET | Refills: 2 | Status: SHIPPED | OUTPATIENT
Start: 2017-11-01 | End: 2017-12-11

## 2017-11-03 ENCOUNTER — INFUSION (OUTPATIENT)
Dept: INFUSION THERAPY | Facility: HOSPITAL | Age: 72
End: 2017-11-03
Attending: INTERNAL MEDICINE
Payer: MEDICARE

## 2017-11-03 VITALS — RESPIRATION RATE: 16 BRPM | HEART RATE: 89 BPM | SYSTOLIC BLOOD PRESSURE: 129 MMHG | DIASTOLIC BLOOD PRESSURE: 73 MMHG

## 2017-11-03 DIAGNOSIS — D63.1 ANEMIA IN CKD (CHRONIC KIDNEY DISEASE): Primary | ICD-10-CM

## 2017-11-03 DIAGNOSIS — N18.9 ANEMIA IN CKD (CHRONIC KIDNEY DISEASE): Primary | ICD-10-CM

## 2017-11-03 DIAGNOSIS — Z53.1 REFUSAL OF BLOOD TRANSFUSIONS AS PATIENT IS JEHOVAH'S WITNESS: ICD-10-CM

## 2017-11-03 PROCEDURE — 96372 THER/PROPH/DIAG INJ SC/IM: CPT

## 2017-11-03 PROCEDURE — 63600175 PHARM REV CODE 636 W HCPCS: Performed by: INTERNAL MEDICINE

## 2017-11-03 RX ADMIN — ERYTHROPOIETIN 10000 UNITS: 10000 INJECTION, SOLUTION INTRAVENOUS; SUBCUTANEOUS at 10:11

## 2017-11-10 ENCOUNTER — LAB VISIT (OUTPATIENT)
Dept: LAB | Facility: HOSPITAL | Age: 72
End: 2017-11-10
Attending: INTERNAL MEDICINE
Payer: MEDICARE

## 2017-11-10 DIAGNOSIS — N18.9 ANEMIA IN CHRONIC KIDNEY DISEASE, UNSPECIFIED CKD STAGE: ICD-10-CM

## 2017-11-10 DIAGNOSIS — Z53.1 REFUSAL OF BLOOD TRANSFUSIONS AS PATIENT IS JEHOVAH'S WITNESS: ICD-10-CM

## 2017-11-10 DIAGNOSIS — N18.9 ANEMIA DUE TO CHRONIC KIDNEY DISEASE: ICD-10-CM

## 2017-11-10 DIAGNOSIS — D63.1 ANEMIA DUE TO CHRONIC KIDNEY DISEASE: ICD-10-CM

## 2017-11-10 DIAGNOSIS — D63.1 ANEMIA IN CHRONIC KIDNEY DISEASE, UNSPECIFIED CKD STAGE: ICD-10-CM

## 2017-11-10 LAB
BASOPHILS # BLD AUTO: 0.01 K/UL
BASOPHILS NFR BLD: 0.1 %
DIFFERENTIAL METHOD: ABNORMAL
EOSINOPHIL # BLD AUTO: 0 K/UL
EOSINOPHIL NFR BLD: 0.3 %
ERYTHROCYTE [DISTWIDTH] IN BLOOD BY AUTOMATED COUNT: 13.4 %
FERRITIN SERPL-MCNC: 69 NG/ML
HCT VFR BLD AUTO: 32.6 %
HGB BLD-MCNC: 10.5 G/DL
IRON SERPL-MCNC: 59 UG/DL
LYMPHOCYTES # BLD AUTO: 1.1 K/UL
LYMPHOCYTES NFR BLD: 14.8 %
MCH RBC QN AUTO: 31.3 PG
MCHC RBC AUTO-ENTMCNC: 32.2 G/DL
MCV RBC AUTO: 97 FL
MONOCYTES # BLD AUTO: 0.4 K/UL
MONOCYTES NFR BLD: 5.1 %
NEUTROPHILS # BLD AUTO: 6.1 K/UL
NEUTROPHILS NFR BLD: 79.7 %
PLATELET # BLD AUTO: 298 K/UL
PMV BLD AUTO: 8.7 FL
RBC # BLD AUTO: 3.36 M/UL
SATURATED IRON: 16 %
TOTAL IRON BINDING CAPACITY: 360 UG/DL
TRANSFERRIN SERPL-MCNC: 243 MG/DL
WBC # BLD AUTO: 7.66 K/UL

## 2017-11-10 PROCEDURE — 82728 ASSAY OF FERRITIN: CPT

## 2017-11-10 PROCEDURE — 85025 COMPLETE CBC W/AUTO DIFF WBC: CPT

## 2017-11-10 PROCEDURE — 83540 ASSAY OF IRON: CPT

## 2017-11-13 ENCOUNTER — OFFICE VISIT (OUTPATIENT)
Dept: HEMATOLOGY/ONCOLOGY | Facility: CLINIC | Age: 72
End: 2017-11-13
Payer: MEDICARE

## 2017-11-13 VITALS
BODY MASS INDEX: 25.27 KG/M2 | OXYGEN SATURATION: 99 % | SYSTOLIC BLOOD PRESSURE: 118 MMHG | TEMPERATURE: 98 F | DIASTOLIC BLOOD PRESSURE: 76 MMHG | WEIGHT: 129.44 LBS | RESPIRATION RATE: 16 BRPM

## 2017-11-13 DIAGNOSIS — D63.1 ANEMIA IN CHRONIC KIDNEY DISEASE, UNSPECIFIED CKD STAGE: Primary | ICD-10-CM

## 2017-11-13 DIAGNOSIS — Z53.1 REFUSAL OF BLOOD TRANSFUSIONS AS PATIENT IS JEHOVAH'S WITNESS: ICD-10-CM

## 2017-11-13 DIAGNOSIS — M85.80 OSTEOPENIA, UNSPECIFIED LOCATION: ICD-10-CM

## 2017-11-13 DIAGNOSIS — N18.9 ANEMIA IN CHRONIC KIDNEY DISEASE, UNSPECIFIED CKD STAGE: Primary | ICD-10-CM

## 2017-11-13 PROCEDURE — 99213 OFFICE O/P EST LOW 20 MIN: CPT | Mod: S$GLB,,, | Performed by: INTERNAL MEDICINE

## 2017-11-13 PROCEDURE — 99999 PR PBB SHADOW E&M-EST. PATIENT-LVL IV: CPT | Mod: PBBFAC,,, | Performed by: INTERNAL MEDICINE

## 2017-11-13 PROCEDURE — 99499 UNLISTED E&M SERVICE: CPT | Mod: S$GLB,,, | Performed by: INTERNAL MEDICINE

## 2017-11-13 NOTE — PROGRESS NOTES
Subjective:       Patient ID: Regino Lawrence is a 72 y.o. female.    Chief Complaint: No chief complaint on file.  Diagnosis: Anemia in CKD  Patient is a Judaism  .  HPI    The patient is seen today for chronic anemia in CKD. The patient reports that she has been diagnosed with JOLLY in the past.  She has been on oral iron supplementation therapy, but could not tolerate or did not respond, she is uncertain.  She is followed by GI and has undergone a colonoscopy earlier this year and was diagnosed with hemorrhoids for which she underwent banding procedure.  No melena, hematochezia,change in bowel habits.  She has also been diagnosed with B12 deficiency in the past, but reports she did not respond to B12 injections. No history of blood transfusions.  She is a Judaism.  She reports that she remembers getting injections in the 1970s when her blood count was low.        She is followed by Rheumatology for history of sarcoidosis with associated myopathy and arthropathy.   .She has been treated in the  past with methotrexate and Plaquenil, both of which were ineffective  Cellcept and imuran caused some unknown side effect.   Colchicine  held due to low WBC    Today, she has no new issues  She continues with  swelling in  hands and diffuse arthralgias  Chronic LBP- stable.   She ambulates with cane  She remains on chronic steroid therapy -currently 10mg Pred daily   No fatigue  No SOB/CP/NV   No recent infections    She continues to undergo Procrit therapy q 2wks  She undergoes intermittent IV iron therapy        CBC 11/10/2017  reveals wbc 7600/mm3  Hb 10.5  g/dl Hct 32.6  % Plt ct 298k    PAST MEDICAL HISTORY:  Acid reflux, alopecia, anemia, anxiety disorder, chronic  kidney disease, depression, diabetes mellitus type 2, hyperlipidemia,  hypertension, hypothyroidism, osteoporosis, sarcoidosis.    PAST SURGICAL HISTORY:  Cholecystectomy, , tubal ligation, carpal tunnel release,  cataracts.    FAMILY HISTORY: Unremarkable for cancer. Significant for HTN.       Review of Systems   Constitutional: Negative for activity change, appetite change and fatigue.   HENT: Negative for hearing loss and nosebleeds.    Eyes: Negative for visual disturbance.   Respiratory: Negative for cough and shortness of breath.    Cardiovascular: Negative for chest pain and leg swelling.   Gastrointestinal: Negative for abdominal pain, constipation, diarrhea and nausea.   Genitourinary: Negative for flank pain and urgency.   Musculoskeletal: Positive for arthralgias, back pain, gait problem and joint swelling.   Skin: Negative for rash.        No petechiae, ecchymoses   Neurological: Negative for light-headedness and headaches.   Hematological: Negative for adenopathy. Does not bruise/bleed easily.       Objective:       Vitals:    11/13/17 0954   BP: 118/76   BP Location: Left arm   Patient Position: Sitting   BP Method: Small (Automatic)   Resp: 16   Temp: 97.8 °F (36.6 °C)   SpO2: 99%   Weight: 58.7 kg (129 lb 6.6 oz)       Physical Exam   Constitutional: She is oriented to person, place, and time. She appears well-developed and well-nourished.   HENT:   Head: Normocephalic.   Mouth/Throat: Oropharynx is clear and moist. No oropharyngeal exudate.   Eyes: Conjunctivae and lids are normal. Pupils are equal, round, and reactive to light. No scleral icterus.   Neck: Normal range of motion. Neck supple. No thyromegaly present.   Cardiovascular: Normal rate, regular rhythm and normal heart sounds.    No murmur heard.  Pulmonary/Chest: Breath sounds normal. She has no wheezes. She has no rales.   Abdominal: Soft. Bowel sounds are normal. She exhibits no distension and no mass. There is no hepatosplenomegaly. There is no tenderness. There is no rebound and no guarding.   Musculoskeletal: Normal range of motion. She exhibits no edema or tenderness.   Lymphadenopathy:     She has no cervical adenopathy.     She has no  axillary adenopathy.        Right: No supraclavicular adenopathy present.        Left: No supraclavicular adenopathy present.   Neurological: She is alert and oriented to person, place, and time. No cranial nerve deficit. Coordination normal.   Skin: Skin is warm and dry. No ecchymosis, no petechiae and no rash noted. No erythema.   Psychiatric: She has a normal mood and affect.             Lab Results   Component Value Date    WBC 7.66 11/10/2017    HGB 10.5 (L) 11/10/2017    HCT 32.6 (L) 11/10/2017    MCV 97 11/10/2017     11/10/2017     Lab Results   Component Value Date    IRON 59 11/10/2017    TIBC 360 11/10/2017    FERRITIN 69 11/10/2017     SPEP-nl          CT renal 3/6/2017   IMPRESSION:  1.  No renal, ureteral or bladder calculi.  No hydronephrosis or ureterectasis.  2.  Poorly distended bladder.  Mild bladder wall prominence.  Mild cystitis cannot be   excluded.  3.  Moderate constipation.  Normal appendix      Lab Results   Component Value Date    IRON 59 11/10/2017    TIBC 360 11/10/2017    FERRITIN 69 11/10/2017       Assessment:       1. Anemia in chronic kidney disease, unspecified CKD stage    2. Refusal of blood transfusions as patient is Catholic    3. Osteopenia, unspecified location        Plan:   1-2 Pt clinically stable  Hb 10.5 g/dl   Ferritin 69  Pt is a Orthod[employer] and declines/not interested in blood and blood products due to Baptism beliefs  Cont  Procrit therapy  q 2wks ( pending lab parameters)      3. Cont Ca and Vit D   Pt being considered for Prolia      F/u 2 mos with Fe studies        CC: Micaela Mendoza M.D.

## 2017-11-17 ENCOUNTER — INFUSION (OUTPATIENT)
Dept: INFUSION THERAPY | Facility: HOSPITAL | Age: 72
End: 2017-11-17
Attending: INTERNAL MEDICINE
Payer: MEDICARE

## 2017-11-17 VITALS — HEART RATE: 95 BPM | SYSTOLIC BLOOD PRESSURE: 126 MMHG | DIASTOLIC BLOOD PRESSURE: 63 MMHG

## 2017-11-17 DIAGNOSIS — D63.1 ANEMIA IN CKD (CHRONIC KIDNEY DISEASE): Primary | ICD-10-CM

## 2017-11-17 DIAGNOSIS — Z53.1 REFUSAL OF BLOOD TRANSFUSIONS AS PATIENT IS JEHOVAH'S WITNESS: ICD-10-CM

## 2017-11-17 DIAGNOSIS — N18.9 ANEMIA IN CKD (CHRONIC KIDNEY DISEASE): Primary | ICD-10-CM

## 2017-11-17 PROCEDURE — 96372 THER/PROPH/DIAG INJ SC/IM: CPT

## 2017-11-17 PROCEDURE — 63600175 PHARM REV CODE 636 W HCPCS: Performed by: INTERNAL MEDICINE

## 2017-11-17 RX ADMIN — ERYTHROPOIETIN 10000 UNITS: 10000 INJECTION, SOLUTION INTRAVENOUS; SUBCUTANEOUS at 10:11

## 2017-11-28 ENCOUNTER — OFFICE VISIT (OUTPATIENT)
Dept: NEPHROLOGY | Facility: CLINIC | Age: 72
End: 2017-11-28
Payer: MEDICARE

## 2017-11-28 VITALS
WEIGHT: 130.06 LBS | OXYGEN SATURATION: 97 % | SYSTOLIC BLOOD PRESSURE: 120 MMHG | BODY MASS INDEX: 25.53 KG/M2 | HEART RATE: 101 BPM | HEIGHT: 60 IN | DIASTOLIC BLOOD PRESSURE: 70 MMHG

## 2017-11-28 DIAGNOSIS — N18.30 CONTROLLED TYPE 2 DIABETES MELLITUS WITH STAGE 3 CHRONIC KIDNEY DISEASE, WITHOUT LONG-TERM CURRENT USE OF INSULIN: Primary | ICD-10-CM

## 2017-11-28 DIAGNOSIS — I10 ESSENTIAL HYPERTENSION: ICD-10-CM

## 2017-11-28 DIAGNOSIS — E11.22 CONTROLLED TYPE 2 DIABETES MELLITUS WITH STAGE 3 CHRONIC KIDNEY DISEASE, WITHOUT LONG-TERM CURRENT USE OF INSULIN: Primary | ICD-10-CM

## 2017-11-28 PROCEDURE — 99499 UNLISTED E&M SERVICE: CPT | Mod: S$GLB,,, | Performed by: INTERNAL MEDICINE

## 2017-11-28 PROCEDURE — 99999 PR PBB SHADOW E&M-EST. PATIENT-LVL II: CPT | Mod: PBBFAC,,, | Performed by: INTERNAL MEDICINE

## 2017-11-28 PROCEDURE — 99213 OFFICE O/P EST LOW 20 MIN: CPT | Mod: S$GLB,,, | Performed by: INTERNAL MEDICINE

## 2017-11-28 RX ORDER — CARVEDILOL 12.5 MG/1
12.5 TABLET ORAL 2 TIMES DAILY
Qty: 180 TABLET | Refills: 1 | Status: SHIPPED | OUTPATIENT
Start: 2017-11-28 | End: 2018-04-19 | Stop reason: SDUPTHER

## 2017-11-28 RX ORDER — AMLODIPINE BESYLATE 10 MG/1
5 TABLET ORAL DAILY
Qty: 90 TABLET | Refills: 1
Start: 2017-11-28 | End: 2017-12-19 | Stop reason: SDUPTHER

## 2017-11-29 ENCOUNTER — PATIENT MESSAGE (OUTPATIENT)
Dept: RHEUMATOLOGY | Facility: CLINIC | Age: 72
End: 2017-11-29

## 2017-12-01 ENCOUNTER — INFUSION (OUTPATIENT)
Dept: INFUSION THERAPY | Facility: HOSPITAL | Age: 72
End: 2017-12-01
Attending: INTERNAL MEDICINE
Payer: MEDICARE

## 2017-12-01 DIAGNOSIS — Z53.1 REFUSAL OF BLOOD TRANSFUSIONS AS PATIENT IS JEHOVAH'S WITNESS: ICD-10-CM

## 2017-12-01 DIAGNOSIS — D63.1 ANEMIA IN CKD (CHRONIC KIDNEY DISEASE): Primary | ICD-10-CM

## 2017-12-01 DIAGNOSIS — N18.9 ANEMIA IN CKD (CHRONIC KIDNEY DISEASE): Primary | ICD-10-CM

## 2017-12-01 PROCEDURE — 96372 THER/PROPH/DIAG INJ SC/IM: CPT

## 2017-12-01 PROCEDURE — 63600175 PHARM REV CODE 636 W HCPCS: Performed by: INTERNAL MEDICINE

## 2017-12-01 RX ADMIN — ERYTHROPOIETIN 10000 UNITS: 10000 INJECTION, SOLUTION INTRAVENOUS; SUBCUTANEOUS at 11:12

## 2017-12-01 NOTE — PROGRESS NOTES
Subjective:       Patient ID: Regino Lawrence is a 72 y.o. Black or  female who presents for follow up of Chronic Kidney Disease    HPI    Ms. Lawrence is a 72 year old woman with medical history of diabetes, hypertension presenting for follow up of chronic kidney disease.  Patient reports blood sugars and blood pressure well-controlled at home.  She denies any NSAID use.  She otherwise denies any fever, chest pain, shortness of breath, abdominal pain, diarrhea, dysuria/hematuria.     Review of Systems   Constitutional: Negative for appetite change, fatigue and fever.   Respiratory: Negative for chest tightness and shortness of breath.    Cardiovascular: Negative for chest pain and leg swelling.   Gastrointestinal: Negative for abdominal pain, constipation, diarrhea, nausea and vomiting.   Genitourinary: Negative for difficulty urinating, dysuria, flank pain, frequency, hematuria and urgency.   Musculoskeletal: Negative for arthralgias, joint swelling and myalgias.   Skin: Negative for rash and wound.   Neurological: Negative for dizziness, weakness and light-headedness.   All other systems reviewed and are negative.      Objective:      Physical Exam   Constitutional: She appears well-developed and well-nourished.   Cardiovascular: Normal rate, regular rhythm and normal heart sounds.  Exam reveals no gallop and no friction rub.    No murmur heard.  Pulmonary/Chest: Effort normal and breath sounds normal. No respiratory distress. She has no wheezes. She has no rales.   Abdominal: Soft. Bowel sounds are normal. There is no tenderness.   Musculoskeletal: She exhibits no edema.   Neurological: She is alert.   Skin: Skin is warm and dry. No rash noted. No erythema.   Psychiatric: She has a normal mood and affect.   Vitals reviewed.      Assessment:       1. Controlled type 2 diabetes mellitus with stage 3 chronic kidney disease, without long-term current use of insulin    2. Essential hypertension         Plan:      Ms. Lawrence is a 72 year old woman with medical history of diabetes, hypertension presenting for follow up of chronic kidney disease.  Patient with early CKD stage III, suspect due to age-related renal nephron loss, along with possible diabetic nephropathy v. hypertensive nephrosclerosis.  Patient creatinine improved and stable after prior elevation (likely with obstructive uropathy, currently resolved, followed by Urology/UroGyn), will continue to trend.  Stressed importance of blood pressure/glycemic control to prevent any further progression of kidney disease, patient voiced understanding.      Return to clinic in 12 months with renal/heme panel, iron/TIBC/ferritin, urinalysis/culture, urine protein/creatinine ratio prior to next visit

## 2017-12-01 NOTE — PLAN OF CARE
Problem: Patient Care Overview (Adult)  Goal: Plan of Care Review  Outcome: Ongoing (interventions implemented as appropriate)  Pt received procrit injection today. VSS. discharge reviewed and pt verbalized understanding.

## 2017-12-05 DIAGNOSIS — D86.9 SARCOIDOSIS: Chronic | ICD-10-CM

## 2017-12-05 DIAGNOSIS — G72.9 MYOPATHY: ICD-10-CM

## 2017-12-05 DIAGNOSIS — E11.22 DIABETES MELLITUS WITH STAGE 3 CHRONIC KIDNEY DISEASE: Chronic | ICD-10-CM

## 2017-12-05 DIAGNOSIS — I10 ESSENTIAL HYPERTENSION: ICD-10-CM

## 2017-12-05 DIAGNOSIS — E78.2 COMBINED HYPERLIPIDEMIA ASSOCIATED WITH TYPE 2 DIABETES MELLITUS: Chronic | ICD-10-CM

## 2017-12-05 DIAGNOSIS — E11.69 COMBINED HYPERLIPIDEMIA ASSOCIATED WITH TYPE 2 DIABETES MELLITUS: Chronic | ICD-10-CM

## 2017-12-05 DIAGNOSIS — N18.30 DIABETES MELLITUS WITH STAGE 3 CHRONIC KIDNEY DISEASE: Chronic | ICD-10-CM

## 2017-12-05 RX ORDER — CYCLOBENZAPRINE HCL 10 MG
10 TABLET ORAL 3 TIMES DAILY
Qty: 180 TABLET | Refills: 1 | Status: SHIPPED | OUTPATIENT
Start: 2017-12-05 | End: 2017-12-07 | Stop reason: SDUPTHER

## 2017-12-05 RX ORDER — FENOFIBRATE 160 MG/1
TABLET ORAL
Qty: 90 TABLET | Refills: 1 | Status: SHIPPED | OUTPATIENT
Start: 2017-12-05 | End: 2018-04-19 | Stop reason: SDUPTHER

## 2017-12-05 RX ORDER — PREDNISONE 10 MG/1
TABLET ORAL
Qty: 90 TABLET | Refills: 1 | Status: SHIPPED | OUTPATIENT
Start: 2017-12-05 | End: 2018-07-26 | Stop reason: SDUPTHER

## 2017-12-05 RX ORDER — LISINOPRIL 20 MG/1
TABLET ORAL
Qty: 90 TABLET | Refills: 1 | Status: SHIPPED | OUTPATIENT
Start: 2017-12-05 | End: 2018-02-05

## 2017-12-06 ENCOUNTER — PATIENT MESSAGE (OUTPATIENT)
Dept: FAMILY MEDICINE | Facility: CLINIC | Age: 72
End: 2017-12-06

## 2017-12-07 ENCOUNTER — OFFICE VISIT (OUTPATIENT)
Dept: RHEUMATOLOGY | Facility: CLINIC | Age: 72
End: 2017-12-07
Payer: MEDICARE

## 2017-12-07 ENCOUNTER — LAB VISIT (OUTPATIENT)
Dept: LAB | Facility: HOSPITAL | Age: 72
End: 2017-12-07
Attending: INTERNAL MEDICINE
Payer: MEDICARE

## 2017-12-07 ENCOUNTER — TELEPHONE (OUTPATIENT)
Dept: RHEUMATOLOGY | Facility: CLINIC | Age: 72
End: 2017-12-07

## 2017-12-07 VITALS
HEIGHT: 60 IN | BODY MASS INDEX: 25.72 KG/M2 | WEIGHT: 131 LBS | TEMPERATURE: 99 F | DIASTOLIC BLOOD PRESSURE: 92 MMHG | SYSTOLIC BLOOD PRESSURE: 166 MMHG | HEART RATE: 101 BPM

## 2017-12-07 DIAGNOSIS — Z79.52 CURRENT USE OF STEROID MEDICATION: ICD-10-CM

## 2017-12-07 DIAGNOSIS — D84.9 IMMUNOSUPPRESSION: Chronic | ICD-10-CM

## 2017-12-07 DIAGNOSIS — M25.40 SWOLLEN JOINT: ICD-10-CM

## 2017-12-07 DIAGNOSIS — M79.10 MYALGIA: ICD-10-CM

## 2017-12-07 DIAGNOSIS — M81.0 OSTEOPOROSIS, UNSPECIFIED OSTEOPOROSIS TYPE, UNSPECIFIED PATHOLOGICAL FRACTURE PRESENCE: Chronic | ICD-10-CM

## 2017-12-07 DIAGNOSIS — D86.9 SARCOIDOSIS: Primary | Chronic | ICD-10-CM

## 2017-12-07 DIAGNOSIS — G72.9 MYOPATHY: ICD-10-CM

## 2017-12-07 DIAGNOSIS — M25.449 SWELLING OF HAND JOINT, UNSPECIFIED LATERALITY: ICD-10-CM

## 2017-12-07 DIAGNOSIS — M85.80 OSTEOPENIA, UNSPECIFIED LOCATION: ICD-10-CM

## 2017-12-07 DIAGNOSIS — D86.9 SARCOIDOSIS: ICD-10-CM

## 2017-12-07 LAB
ALBUMIN SERPL BCP-MCNC: 3.2 G/DL
ALP SERPL-CCNC: 53 U/L
ALT SERPL W/O P-5'-P-CCNC: 9 U/L
ANION GAP SERPL CALC-SCNC: 10 MMOL/L
AST SERPL-CCNC: 8 U/L
BASOPHILS # BLD AUTO: 0.02 K/UL
BASOPHILS NFR BLD: 0.3 %
BILIRUB SERPL-MCNC: 0.2 MG/DL
BUN SERPL-MCNC: 14 MG/DL
CALCIUM SERPL-MCNC: 8.9 MG/DL
CHLORIDE SERPL-SCNC: 102 MMOL/L
CK SERPL-CCNC: 54 U/L
CO2 SERPL-SCNC: 28 MMOL/L
CREAT SERPL-MCNC: 0.9 MG/DL
CRP SERPL-MCNC: 5.1 MG/L
DIFFERENTIAL METHOD: ABNORMAL
EOSINOPHIL # BLD AUTO: 0.1 K/UL
EOSINOPHIL NFR BLD: 0.8 %
ERYTHROCYTE [DISTWIDTH] IN BLOOD BY AUTOMATED COUNT: 13.4 %
ERYTHROCYTE [SEDIMENTATION RATE] IN BLOOD BY WESTERGREN METHOD: 8 MM/HR
EST. GFR  (AFRICAN AMERICAN): >60 ML/MIN/1.73 M^2
EST. GFR  (NON AFRICAN AMERICAN): >60 ML/MIN/1.73 M^2
GLUCOSE SERPL-MCNC: 100 MG/DL
HCT VFR BLD AUTO: 33.4 %
HGB BLD-MCNC: 10.7 G/DL
IMM GRANULOCYTES # BLD AUTO: 0.12 K/UL
IMM GRANULOCYTES NFR BLD AUTO: 1.5 %
LYMPHOCYTES # BLD AUTO: 2.2 K/UL
LYMPHOCYTES NFR BLD: 27.7 %
MCH RBC QN AUTO: 31.4 PG
MCHC RBC AUTO-ENTMCNC: 32 G/DL
MCV RBC AUTO: 98 FL
MONOCYTES # BLD AUTO: 0.6 K/UL
MONOCYTES NFR BLD: 7.4 %
NEUTROPHILS # BLD AUTO: 5 K/UL
NEUTROPHILS NFR BLD: 62.3 %
NRBC BLD-RTO: 0 /100 WBC
PLATELET # BLD AUTO: 305 K/UL
PMV BLD AUTO: 8.9 FL
POTASSIUM SERPL-SCNC: 3.6 MMOL/L
PROT SERPL-MCNC: 6.4 G/DL
RBC # BLD AUTO: 3.41 M/UL
SODIUM SERPL-SCNC: 140 MMOL/L
WBC # BLD AUTO: 7.98 K/UL

## 2017-12-07 PROCEDURE — 99999 PR PBB SHADOW E&M-EST. PATIENT-LVL III: CPT | Mod: PBBFAC,,, | Performed by: INTERNAL MEDICINE

## 2017-12-07 PROCEDURE — 99214 OFFICE O/P EST MOD 30 MIN: CPT | Mod: 25,S$GLB,, | Performed by: INTERNAL MEDICINE

## 2017-12-07 PROCEDURE — 36415 COLL VENOUS BLD VENIPUNCTURE: CPT

## 2017-12-07 PROCEDURE — 96372 THER/PROPH/DIAG INJ SC/IM: CPT | Mod: S$GLB,,, | Performed by: INTERNAL MEDICINE

## 2017-12-07 PROCEDURE — 86140 C-REACTIVE PROTEIN: CPT

## 2017-12-07 PROCEDURE — 85651 RBC SED RATE NONAUTOMATED: CPT

## 2017-12-07 PROCEDURE — 80053 COMPREHEN METABOLIC PANEL: CPT

## 2017-12-07 PROCEDURE — 82550 ASSAY OF CK (CPK): CPT

## 2017-12-07 PROCEDURE — 85025 COMPLETE CBC W/AUTO DIFF WBC: CPT

## 2017-12-07 PROCEDURE — 82085 ASSAY OF ALDOLASE: CPT

## 2017-12-07 PROCEDURE — 76882 US LMTD JT/FCL EVL NVASC XTR: CPT | Mod: S$GLB,,, | Performed by: INTERNAL MEDICINE

## 2017-12-07 PROCEDURE — 99499 UNLISTED E&M SERVICE: CPT | Mod: S$GLB,,, | Performed by: INTERNAL MEDICINE

## 2017-12-07 RX ORDER — TRIAMCINOLONE ACETONIDE 40 MG/ML
80 INJECTION, SUSPENSION INTRA-ARTICULAR; INTRAMUSCULAR
Status: COMPLETED | OUTPATIENT
Start: 2017-12-07 | End: 2017-12-07

## 2017-12-07 RX ORDER — LEFLUNOMIDE 10 MG/1
10 TABLET ORAL DAILY
Qty: 30 TABLET | Refills: 2 | Status: SHIPPED | OUTPATIENT
Start: 2017-12-07 | End: 2018-05-28 | Stop reason: ALTCHOICE

## 2017-12-07 RX ORDER — CYCLOBENZAPRINE HCL 10 MG
10 TABLET ORAL 3 TIMES DAILY PRN
Qty: 270 TABLET | Refills: 0 | Status: SHIPPED | OUTPATIENT
Start: 2017-12-07 | End: 2017-12-22 | Stop reason: SDUPTHER

## 2017-12-07 RX ADMIN — TRIAMCINOLONE ACETONIDE 80 MG: 40 INJECTION, SUSPENSION INTRA-ARTICULAR; INTRAMUSCULAR at 09:12

## 2017-12-07 ASSESSMENT — ROUTINE ASSESSMENT OF PATIENT INDEX DATA (RAPID3)
PATIENT GLOBAL ASSESSMENT SCORE: 10
PAIN SCORE: 10
WHEN YOU AWAKENED IN THE MORNING OVER THE LAST WEEK, PLEASE INDICATE THE AMOUNT OF TIME IT TAKES UNTIL YOU ARE AS LIMBER AS YOU WILL BE FOR THE DAY: 10 MINUTES
MDHAQ FUNCTION SCORE: 1.1
AM STIFFNESS SCORE: 1, YES
TOTAL RAPID3 SCORE: 7.89
FATIGUE SCORE: 10
PSYCHOLOGICAL DISTRESS SCORE: 0

## 2017-12-07 NOTE — Clinical Note
We will be starting leflunomide to manage Mrs. Lawrence's myopathy and arthropathy from sarcoidosis.  Do you have any objections to the use of leflunomide?

## 2017-12-07 NOTE — PATIENT INSTRUCTIONS
Leflunomide tablets  What is this medicine?  LEFLUNOMIDE (le FLOO na mide) is for rheumatoid arthritis.  How should I use this medicine?  Take this medicine by mouth with a full glass of water. Follow the directions on the prescription label. Take your medicine at regular intervals. Do not take your medicine more often than directed. Do not stop taking except on your doctor's advice.  Talk to your pediatrician regarding the use of this medicine in children. Special care may be needed.  What side effects may I notice from receiving this medicine?  Side effects that you should report to your doctor or health care professional as soon as possible:  · allergic reactions like skin rash, itching or hives, swelling of the face, lips, or tongue  · cough  · difficulty breathing or shortness of breath  · fever, chills or any other sign of infection  · redness, blistering, peeling or loosening of the skin, including inside the mouth  · unusual bleeding or bruising  · unusually weak or tired  · vomiting  · yellowing of eyes or skin  Side effects that usually do not require medical attention (report to your doctor or health care professional if they continue or are bothersome):  · diarrhea  · hair loss  · headache  · nausea  What may interact with this medicine?  Do not take this medicine with any of the following medications:  · teriflunomide  This medicine may also interact with the following medications:  · charcoal  · cholestyramine  · methotrexate  · NSAIDs, medicines for pain and inflammation, like ibuprofen or naproxen  · phenytoin  · rifampin  · tolbutamide  · vaccines  · warfarin  What if I miss a dose?  If you miss a dose, take it as soon as you can. If it is almost time for your next dose, take only that dose. Do not take double or extra doses.  Where should I keep my medicine?  Keep out of the reach of children.  Store at room temperature between 15 and 30 degrees C (59 and 86 degrees F). Protect from moisture and  light. Throw away any unused medicine after the expiration date.  What should I tell my health care provider before I take this medicine?  They need to know if you have any of these conditions:  · alcoholism  · bone marrow problems  · fever or infection  · immune system problems  · kidney disease  · liver disease  · an unusual or allergic reaction to leflunomide, teriflunomide, other medicines, lactose, foods, dyes, or preservatives  · pregnant or trying to get pregnant  · breast-feeding  What should I watch for while using this medicine?  Visit your doctor or health care professional for regular checks on your progress. You will need frequent blood checks while you are receiving the medicine.  If you get a cold or other infection while receiving this medicine, call your doctor or health care professional. Do not treat yourself. The medicine may increase your risk of getting an infection.  If you are a woman who has the potential to become pregnant, discuss birth control options with your doctor or health care professional. You must not be pregnant, and you must be using a reliable form of birth control. The medicine may harm an unborn baby. Immediately call your doctor if you think you might be pregnant.  Alcoholic drinks may increase possible damage to your liver. Do not drink alcohol while taking this medicine.  NOTE:This sheet is a summary. It may not cover all possible information. If you have questions about this medicine, talk to your doctor, pharmacist, or health care provider. Copyright© 2017 [employer]

## 2017-12-07 NOTE — PROGRESS NOTES
Subjective:       Patient ID: Regino Lawrence is a 72 y.o. female.    Chief Complaint: Disease Management      HPI:  Regino Lawrence is a 72 y.o. female with history of sarcoidosis with associated myopathy and   arthropathy. Sarcoidosis that was first manifested by muscle inflammation, low white   blood cell count, hair loss, skin involvement. She was treated in the   past with methotrexate and Plaquenil, both of which were ineffective.   Cellcept and imuran caused some unknown side effect (she thinks it made her sick).   Colchicine was held due to low WBC.   Although methotrexate did not help in past it was retried and she felt it helped hair growth but did not help body aches.   She held MTX due to an URI but patient has not wanted restarted since then (2013).       She s/p laminectomy-cervical fusion C3-C7 11/16/2015 for cervical spinal stenosis.   S/p surgery for bladder prolapse. Required Epogen before surgery and had 2 infusions after surgery.        Reports 7 out of 7 days of pain all over.   Pain is 10/10 burning pain throughout her body.  Activity worsens.  Nothing helps the pain.  Using hands worsen pain.  She is currently on prednisone 15 mg without improvement.    Review of Systems      Objective:   BP (!) 166/92 (BP Location: Left arm, Patient Position: Sitting, BP Method: Small (Automatic))   Pulse 101   Temp 98.5 °F (36.9 °C) (Oral)   Ht 5' (1.524 m)   Wt 59.4 kg (131 lb)   LMP  (LMP Unknown)   BMI 25.58 kg/m²      Physical Exam   Constitutional: She is oriented to person, place, and time and well-developed, well-nourished, and in no distress.   HENT:   Head: Normocephalic and atraumatic.   Eyes: Conjunctivae and EOM are normal.   Neck: Neck supple.   Cardiovascular: Normal rate, regular rhythm and normal heart sounds.    Pulmonary/Chest: Effort normal and breath sounds normal.   Abdominal: Soft. Bowel sounds are normal.   Neurological: She is alert and oriented to person, place, and time.    Slow painful gait   Skin: Skin is warm and dry.     Psychiatric: Mood and affect normal.   Musculoskeletal:   Swollen right 2nd MCP and left 3rd MCP              LABS    Component      Latest Ref Rng & Units 12/1/2017 9/8/2017   WBC      3.90 - 12.70 K/uL 7.57    RBC      4.00 - 5.40 M/uL 3.39 (L)    Hemoglobin      12.0 - 16.0 g/dL 10.8 (L)    Hematocrit      37.0 - 48.5 % 33.2 (L)    MCV      82 - 98 fL 98    MCH      27.0 - 31.0 pg 31.9 (H)    MCHC      32.0 - 36.0 g/dL 32.5    RDW      11.5 - 14.5 % 13.2    Platelets      150 - 350 K/uL 305    MPV      9.2 - 12.9 fL 9.7    Gran #      1.8 - 7.7 K/uL 4.0    Lymph #      1.0 - 4.8 K/uL 2.7    Mono #      0.3 - 1.0 K/uL 0.7    Eos #      0.0 - 0.5 K/uL 0.1    Baso #      0.00 - 0.20 K/uL 0.03    Gran%      38.0 - 73.0 % 53.3    Lymph%      18.0 - 48.0 % 35.3    Mono%      4.0 - 15.0 % 9.5    Eosinophil%      0.0 - 8.0 % 1.5    Basophil%      0.0 - 1.9 % 0.4    Differential Method       Automated    Sodium      136 - 145 mmol/L  139   Potassium      3.5 - 5.1 mmol/L  3.9   Chloride      95 - 110 mmol/L  103   CO2      23 - 29 mmol/L  25   Glucose      70 - 110 mg/dL  229 (H)   BUN, Bld      8 - 23 mg/dL  11   Creatinine      0.5 - 1.4 mg/dL  1.0   Calcium      8.7 - 10.5 mg/dL  9.2   Total Protein      6.0 - 8.4 g/dL  6.6   Albumin      3.5 - 5.2 g/dL  3.3 (L)   Total Bilirubin      0.1 - 1.0 mg/dL  0.3   Alkaline Phosphatase      55 - 135 U/L  50 (L)   AST      10 - 40 U/L  10   ALT      10 - 44 U/L  13   Anion Gap      8 - 16 mmol/L  11   eGFR if African American      >60 mL/min/1.73 m:2  >60   eGFR if non African American      >60 mL/min/1.73 m:2  57 (A)   Aldolase      1.2 - 7.6 U/L  1.4   CPK      20 - 180 U/L  68   CRP      0.0 - 8.2 mg/L  10.9 (H)   Sed Rate      0 - 20 mm/Hr  17     Assessment:       1.   Sarcoidosis. Manifested by myopathy and arthropathy. Persistent joint pain and myalgias despite prednisone 15 mg.  Now with swelling of 2 MCPs   2.    Myalgia and myositis.    3.   Osteopenia. Took Fosamax for 5 years stopped 6/2013.    4.   Fatigue     5.   Diabetes mellitus type 2 in nonobese     6.           Neck pain. X-ray with degenerative changes. S/p laminectomy-cervical fusion C3-C7 11/16/2015 for cervical spinal stenosis.    7.           Back pain    8.           HTN.    Plan:       1. Labs  2. Continue prednisone 15 mg daily. Add leflunomide 10 mg daily.  Message to Heme/onc informing them of plan.  Reminded patient that Arava+Coreg lowers benefits of Coreg.   Discussed risk of AVN and other issues with prednisone.  Encouraged patient to try steroid sparing agent.   3. Continue tramadol for pain. Can take 3 times daily.   4. Following with nephrology  5. DEXA with osteopenia of hip total and femoral neck. FRAX does not suggest treatment however with prednisone>7.5 mg will consider Prolia (due renal insufficiency).  Information provided for patient to review.  She will let me know how she would like to proceed after seeing a dentist.        Ultrasound Note    Study: Ultrasound of  right 2nd MCP and left 3rd MCP  Indication: Swelling  Findings: Sonosite 15-6 MHz probe used to view  right 2nd MCP and left 3rd MCP in transverse and longitude.  Positive Doppler signal seen on  right 2nd MCP and left 3rd MCP.  Suspected erosion of  right 2nd MCP.          RTO in 3-4 months.    Patient seen face to face for 25 minutes and greater than 50% spent in counseling regarding Joint pains,   management of sarcoidosis and pain.

## 2017-12-08 ENCOUNTER — PATIENT MESSAGE (OUTPATIENT)
Dept: RHEUMATOLOGY | Facility: CLINIC | Age: 72
End: 2017-12-08

## 2017-12-08 LAB — ALDOLASE SERPL-CCNC: 2.2 U/L

## 2017-12-11 ENCOUNTER — OFFICE VISIT (OUTPATIENT)
Dept: FAMILY MEDICINE | Facility: CLINIC | Age: 72
End: 2017-12-11
Payer: MEDICARE

## 2017-12-11 VITALS
HEART RATE: 97 BPM | BODY MASS INDEX: 25.06 KG/M2 | DIASTOLIC BLOOD PRESSURE: 88 MMHG | HEIGHT: 60 IN | TEMPERATURE: 98 F | WEIGHT: 127.63 LBS | OXYGEN SATURATION: 96 % | SYSTOLIC BLOOD PRESSURE: 160 MMHG | RESPIRATION RATE: 18 BRPM

## 2017-12-11 DIAGNOSIS — D84.9 IMMUNOSUPPRESSION: Chronic | ICD-10-CM

## 2017-12-11 DIAGNOSIS — E11.8 CONTROLLED TYPE 2 DIABETES MELLITUS WITH COMPLICATION, WITHOUT LONG-TERM CURRENT USE OF INSULIN: Chronic | ICD-10-CM

## 2017-12-11 DIAGNOSIS — I10 ESSENTIAL HYPERTENSION: Primary | Chronic | ICD-10-CM

## 2017-12-11 DIAGNOSIS — D86.9 SARCOIDOSIS: Chronic | ICD-10-CM

## 2017-12-11 DIAGNOSIS — M48.02 CERVICAL SPINAL STENOSIS: ICD-10-CM

## 2017-12-11 PROCEDURE — 99214 OFFICE O/P EST MOD 30 MIN: CPT | Mod: S$GLB,,, | Performed by: FAMILY MEDICINE

## 2017-12-11 PROCEDURE — 99999 PR PBB SHADOW E&M-EST. PATIENT-LVL III: CPT | Mod: PBBFAC,,, | Performed by: FAMILY MEDICINE

## 2017-12-11 PROCEDURE — 99499 UNLISTED E&M SERVICE: CPT | Mod: S$GLB,,, | Performed by: FAMILY MEDICINE

## 2017-12-11 RX ORDER — TRAMADOL HYDROCHLORIDE 50 MG/1
100 TABLET ORAL EVERY 6 HOURS PRN
Qty: 180 TABLET | Refills: 0 | Status: SHIPPED | OUTPATIENT
Start: 2017-12-11 | End: 2018-01-10

## 2017-12-11 NOTE — PROGRESS NOTES
Chief Complaint   Patient presents with    Diabetes    Anemia    Follow-up       HPI  Regino Lawrence is a 72 y.o. female with multiple medical diagnoses as listed in the medical history and problem list that presents for follow-up for diabetes, anemia and chronic pain from sarcoid. She is in significant pain today and her blood pressure is elevated. She is having swelling in her hands and cannot use them. She is also having pain in her hips that is worse with walking. Her sugars have been stable. The cold weather is increasing her pain. She is taking prednisone along with Arava for her Sarcoid.    PAST MEDICAL HISTORY:  Past Medical History:   Diagnosis Date    Acid reflux     Allergy     Alopecia     Anemia     Anxiety     Arthritis     Back pain     Cataract     Chronic kidney disease     Depression     Diabetes mellitus, type 2     Eye injury as a child     k-abrasion  od    Hyperlipidemia     Hypertension     Hypothyroidism     Immune deficiency disorder     Immune disorder     Myalgia and myositis 2012    Osteoporosis     Polyneuropathy     Renal manifestation of secondary diabetes mellitus     Sarcoidosis     Ulcer     no cancer    Urinary incontinence        PAST SURGICAL HISTORY:  Past Surgical History:   Procedure Laterality Date    CARPAL TUNNEL RELEASE      Rt wrist    CATARACT EXTRACTION W/  INTRAOCULAR LENS IMPLANT Right 2015    Dr. Azevedo    CATARACT EXTRACTION W/  INTRAOCULAR LENS IMPLANT Left 2015    Dr. Azevedo     SECTION      CHOLECYSTECTOMY      TUBAL LIGATION         SOCIAL HISTORY:  Social History     Social History    Marital status:      Spouse name: N/A    Number of children: N/A    Years of education: N/A     Occupational History    Not on file.     Social History Main Topics    Smoking status: Never Smoker    Smokeless tobacco: Never Used    Alcohol use No    Drug use: No    Sexual activity: Yes     Partners:  Male     Other Topics Concern    Not on file     Social History Narrative    No narrative on file       FAMILY HISTORY:  Family History   Problem Relation Age of Onset    Hypertension Mother     Cataracts Mother     No Known Problems Father     Hypertension Maternal Grandmother     Glaucoma Sister     Arthritis Sister     No Known Problems Brother     No Known Problems Maternal Aunt     No Known Problems Maternal Uncle     No Known Problems Paternal Aunt     No Known Problems Paternal Uncle     No Known Problems Maternal Grandfather     No Known Problems Paternal Grandmother     No Known Problems Paternal Grandfather     Kidney failure Sister     Hepatitis Sister     Cancer Sister      bone cancer     Immunodeficiency Sister     Lupus Neg Hx     Rheum arthritis Neg Hx     Amblyopia Neg Hx     Blindness Neg Hx     Diabetes Neg Hx     Macular degeneration Neg Hx     Retinal detachment Neg Hx     Strabismus Neg Hx     Stroke Neg Hx     Thyroid disease Neg Hx     Endometrial cancer Neg Hx     Vaginal cancer Neg Hx     Cervical cancer Neg Hx        ALLERGIES AND MEDICATIONS: updated and reviewed.  Review of patient's allergies indicates:   Allergen Reactions    Azathioprine Shortness Of Breath and Other (See Comments)     Fatigue     Current Outpatient Prescriptions   Medication Sig Dispense Refill    ACCU-CHEK FASTCLIX Kit USE AS DIRECTED. 1 each 0    ALCOHOL ANTISEPTIC PADS (ALCOHOL PREP PADS TOP)       amLODIPine (NORVASC) 10 MG tablet Take 0.5 tablets (5 mg total) by mouth once daily. 90 tablet 1    blood sugar diagnostic Strp 1 each by Misc.(Non-Drug; Combo Route) route once daily. 100 strip 11    blood-glucose meter kit Use as instructed 1 each 0    calcium carbonate (OS-MALCOLM) 500 mg calcium (1,250 mg) tablet Take 1 tablet (500 mg total) by mouth 2 (two) times daily.  0    carvedilol (COREG) 12.5 MG tablet Take 1 tablet (12.5 mg total) by mouth 2 (two) times daily. 180 tablet  1    cetirizine (ZYRTEC) 10 MG tablet       cloNIDine (CATAPRES) 0.3 MG tablet Take 1 tablet (0.3 mg total) by mouth 3 (three) times daily. 270 tablet 1    cyclobenzaprine (FLEXERIL) 10 MG tablet Take 1 tablet (10 mg total) by mouth 3 (three) times daily as needed for Muscle spasms. 270 tablet 0    fenofibrate 160 MG Tab TAKE 1 TABLET EVERY DAY 90 tablet 1    gabapentin (NEURONTIN) 400 MG capsule Take 1 capsule (400 mg total) by mouth 2 (two) times daily. 180 capsule 1    lancing device (ACCU-CHEK SOFTCLIX LANCET DEV) Misc Accu-chek FastClix kit 1 each 0    leflunomide (ARAVA) 10 MG Tab Take 1 tablet (10 mg total) by mouth once daily. 30 tablet 2    levothyroxine (SYNTHROID) 50 MCG tablet TAKE 1 TABLET (50 MCG TOTAL) BY MOUTH BEFORE BREAKFAST. 90 tablet 1    lisinopril (PRINIVIL,ZESTRIL) 20 MG tablet TAKE 1 TABLET EVERY DAY 90 tablet 1    LUBRICANT EYE DROPS, GLYC-PG, 1-0.3 % Drop       metformin (GLUCOPHAGE) 1000 MG tablet Take 1 tablet (1,000 mg total) by mouth 2 (two) times daily with meals. 180 tablet 1    ondansetron (ZOFRAN) 8 MG tablet Take 1 tablet (8 mg total) by mouth every 12 (twelve) hours as needed for Nausea. 25 tablet 1    potassium chloride SA (K-DUR,KLOR-CON) 20 MEQ tablet Take 1 tablet (20 mEq total) by mouth 2 (two) times daily. 180 tablet 1    predniSONE (DELTASONE) 10 MG tablet TAKE 1 TABLET ONCE DAILY 90 tablet 1    traMADol (ULTRAM) 50 mg tablet Take 2 tablets (100 mg total) by mouth every 6 (six) hours as needed for Pain. 180 tablet 0     No current facility-administered medications for this visit.        ROS  Review of Systems   Constitutional: Positive for activity change. Negative for chills, diaphoresis, fatigue, fever and unexpected weight change.   HENT: Positive for rhinorrhea. Negative for hearing loss, sinus pressure, sore throat, tinnitus and trouble swallowing.    Eyes: Positive for visual disturbance. Negative for photophobia and discharge.   Respiratory: Negative  for cough, chest tightness, shortness of breath and wheezing.    Cardiovascular: Positive for palpitations. Negative for chest pain.   Gastrointestinal: Positive for vomiting. Negative for abdominal pain, blood in stool, constipation, diarrhea and nausea.   Endocrine: Negative for polydipsia and polyuria.   Genitourinary: Negative for difficulty urinating, dysuria, flank pain, frequency, hematuria, menstrual problem and vaginal discharge.   Musculoskeletal: Positive for arthralgias, joint swelling and neck pain.   Skin: Negative for rash.   Neurological: Positive for weakness. Negative for speech difficulty, light-headedness and headaches.   Psychiatric/Behavioral: Negative for behavioral problems, confusion and dysphoric mood.       Physical Exam  Vitals:    12/11/17 1002 12/11/17 1040   BP: (!) 156/86 (!) 160/88   Pulse: 97    Resp: 18    Temp: 98 °F (36.7 °C)    TempSrc: Oral    SpO2: 96%    Weight: 57.9 kg (127 lb 10.3 oz)    Height: 5' (1.524 m)     Body mass index is 24.93 kg/m².  Weight: 57.9 kg (127 lb 10.3 oz)   Height: 5' (152.4 cm)     Physical Exam   Constitutional: She is oriented to person, place, and time. She appears well-developed and well-nourished.   Eyes: EOM are normal.   Neurological: She is alert and oriented to person, place, and time.   Skin: Skin is warm and dry. No rash noted. No erythema.   Psychiatric: She has a normal mood and affect. Her behavior is normal.   Nursing note and vitals reviewed.      Health Maintenance       Date Due Completion Date    Hemoglobin A1c 12/30/2017 6/30/2017    Mammogram 04/18/2018 4/18/2017    Lipid Panel 05/08/2018 5/8/2017    Foot Exam 05/12/2018 5/12/2017 (Done)    Override on 5/12/2017: Done    Urine Microalbumin 05/18/2018 5/18/2017    Eye Exam 06/29/2018 6/29/2017    DEXA SCAN 05/05/2020 5/5/2017    Colonoscopy 02/09/2025 2/9/2015 (Done)    Override on 2/9/2015: Done    Override on 2/18/2005: Done (reportedly normal)    TETANUS VACCINE 05/16/2026  5/16/2016            ASSESSMENT     1. Essential hypertension    2. Sarcoidosis    3. Immunosuppression    4. Controlled type 2 diabetes mellitus with complication, without long-term current use of insulin    5. Cervical spinal stenosis        PLAN:     Problem List Items Addressed This Visit        Neuro    Cervical spinal stenosis  -she is going to follow up with orthopedics    Relevant Medications    traMADol (ULTRAM) 50 mg tablet       Cardiac/Vascular    Essential hypertension - Primary (Chronic)  -BP recheck, one month nurse visit if still elevated, this is likely due to pain       Immunology/Multi System    Sarcoidosis (Chronic)  -increasing the amt so that she can take two every 8 hours as needed  She is on new medication and waiting for this to improve her pain, hopefully we can decrease the dose when the weather improves    Relevant Medications    traMADol (ULTRAM) 50 mg tablet    Immunosuppression (Chronic)  -not showing signs of acute illness       Endocrine    Diabetes mellitus type 2, controlled (Chronic)  -This is a chronic medical condition that is stable under the current regimen. Continue to monitor and we will make medication adjustments as needed.               Micaela Mendoza MD  12/11/2017 10:34 AM        Return in about 3 months (around 3/11/2018) for Follow up.

## 2017-12-19 DIAGNOSIS — E11.8 CONTROLLED DIABETES MELLITUS TYPE 2 WITH COMPLICATIONS, UNSPECIFIED LONG TERM INSULIN USE STATUS: ICD-10-CM

## 2017-12-19 DIAGNOSIS — E11.22 DIABETES MELLITUS WITH STAGE 3 CHRONIC KIDNEY DISEASE: Chronic | ICD-10-CM

## 2017-12-19 DIAGNOSIS — I10 ESSENTIAL HYPERTENSION: ICD-10-CM

## 2017-12-19 DIAGNOSIS — N18.30 DIABETES MELLITUS WITH STAGE 3 CHRONIC KIDNEY DISEASE: Chronic | ICD-10-CM

## 2017-12-19 RX ORDER — AMLODIPINE BESYLATE 10 MG/1
5 TABLET ORAL DAILY
Qty: 90 TABLET | Refills: 1
Start: 2017-12-19 | End: 2018-09-12

## 2017-12-19 RX ORDER — METFORMIN HYDROCHLORIDE 1000 MG/1
1000 TABLET ORAL 2 TIMES DAILY WITH MEALS
Qty: 180 TABLET | Refills: 1 | Status: SHIPPED | OUTPATIENT
Start: 2017-12-19 | End: 2018-03-22 | Stop reason: SDUPTHER

## 2017-12-22 ENCOUNTER — TELEPHONE (OUTPATIENT)
Dept: RHEUMATOLOGY | Facility: CLINIC | Age: 72
End: 2017-12-22

## 2017-12-22 DIAGNOSIS — G72.9 MYOPATHY: Primary | ICD-10-CM

## 2017-12-22 RX ORDER — CYCLOBENZAPRINE HCL 10 MG
10 TABLET ORAL 3 TIMES DAILY PRN
Qty: 270 TABLET | Refills: 0 | Status: SHIPPED | OUTPATIENT
Start: 2017-12-22 | End: 2018-07-26 | Stop reason: SDUPTHER

## 2017-12-22 NOTE — TELEPHONE ENCOUNTER
Patient reports BP elevated since starting leflunomide (SBP up to 180s).  Leflunomide can lower benefit of Coreg.  Patient instructed to hold leflunomide and follow with cardiologist/primary doctor.      Patient has not received cyclobenzaprine ordered previously .  Will send again.

## 2017-12-29 ENCOUNTER — INFUSION (OUTPATIENT)
Dept: INFUSION THERAPY | Facility: HOSPITAL | Age: 72
End: 2017-12-29
Attending: INTERNAL MEDICINE
Payer: MEDICARE

## 2017-12-29 VITALS — DIASTOLIC BLOOD PRESSURE: 68 MMHG | HEART RATE: 89 BPM | RESPIRATION RATE: 16 BRPM | SYSTOLIC BLOOD PRESSURE: 135 MMHG

## 2017-12-29 DIAGNOSIS — D63.1 ANEMIA IN CKD (CHRONIC KIDNEY DISEASE): Primary | ICD-10-CM

## 2017-12-29 DIAGNOSIS — Z53.1 REFUSAL OF BLOOD TRANSFUSIONS AS PATIENT IS JEHOVAH'S WITNESS: ICD-10-CM

## 2017-12-29 DIAGNOSIS — N18.9 ANEMIA IN CKD (CHRONIC KIDNEY DISEASE): Primary | ICD-10-CM

## 2017-12-29 PROCEDURE — 96372 THER/PROPH/DIAG INJ SC/IM: CPT

## 2017-12-29 PROCEDURE — 63600175 PHARM REV CODE 636 W HCPCS: Performed by: INTERNAL MEDICINE

## 2017-12-29 RX ADMIN — ERYTHROPOIETIN 10000 UNITS: 10000 INJECTION, SOLUTION INTRAVENOUS; SUBCUTANEOUS at 11:12

## 2018-01-03 ENCOUNTER — TELEPHONE (OUTPATIENT)
Dept: RHEUMATOLOGY | Facility: CLINIC | Age: 73
End: 2018-01-03

## 2018-01-11 ENCOUNTER — CLINICAL SUPPORT (OUTPATIENT)
Dept: FAMILY MEDICINE | Facility: CLINIC | Age: 73
End: 2018-01-11
Payer: MEDICARE

## 2018-01-11 VITALS — HEART RATE: 86 BPM | DIASTOLIC BLOOD PRESSURE: 70 MMHG | SYSTOLIC BLOOD PRESSURE: 110 MMHG

## 2018-01-11 DIAGNOSIS — I10 BENIGN ESSENTIAL HTN: Primary | ICD-10-CM

## 2018-01-11 PROCEDURE — 99999 PR PBB SHADOW E&M-EST. PATIENT-LVL I: CPT | Mod: PBBFAC,,,

## 2018-01-11 PROCEDURE — 99499 UNLISTED E&M SERVICE: CPT | Mod: S$GLB,,, | Performed by: FAMILY MEDICINE

## 2018-01-11 NOTE — PROGRESS NOTES
Regino Lawrence 72 y.o. female is here today for Blood Pressure check.   History of HTN yes.    Review of patient's allergies indicates:   Allergen Reactions    Azathioprine Shortness Of Breath and Other (See Comments)     Fatigue     Creatinine   Date Value Ref Range Status   12/07/2017 0.9 0.5 - 1.4 mg/dL Final     Sodium   Date Value Ref Range Status   12/07/2017 140 136 - 145 mmol/L Final     Potassium   Date Value Ref Range Status   12/07/2017 3.6 3.5 - 5.1 mmol/L Final   ]  Patient verifies taking blood pressure medications on a regular basis at the same time of the day.     Current Outpatient Prescriptions:     ACCU-CHEK FASTCLIX Kit, USE AS DIRECTED., Disp: 1 each, Rfl: 0    ALCOHOL ANTISEPTIC PADS (ALCOHOL PREP PADS TOP), , Disp: , Rfl:     amLODIPine (NORVASC) 10 MG tablet, Take 0.5 tablets (5 mg total) by mouth once daily., Disp: 90 tablet, Rfl: 1    blood sugar diagnostic Strp, 1 each by Misc.(Non-Drug; Combo Route) route once daily., Disp: 100 strip, Rfl: 11    blood-glucose meter kit, Use as instructed, Disp: 1 each, Rfl: 0    calcium carbonate (OS-MALCOLM) 500 mg calcium (1,250 mg) tablet, Take 1 tablet (500 mg total) by mouth 2 (two) times daily., Disp: , Rfl: 0    carvedilol (COREG) 12.5 MG tablet, Take 1 tablet (12.5 mg total) by mouth 2 (two) times daily., Disp: 180 tablet, Rfl: 1    cetirizine (ZYRTEC) 10 MG tablet, , Disp: , Rfl:     cloNIDine (CATAPRES) 0.3 MG tablet, Take 1 tablet (0.3 mg total) by mouth 3 (three) times daily., Disp: 270 tablet, Rfl: 1    cyclobenzaprine (FLEXERIL) 10 MG tablet, Take 1 tablet (10 mg total) by mouth 3 (three) times daily as needed for Muscle spasms., Disp: 270 tablet, Rfl: 0    fenofibrate 160 MG Tab, TAKE 1 TABLET EVERY DAY, Disp: 90 tablet, Rfl: 1    gabapentin (NEURONTIN) 400 MG capsule, Take 1 capsule (400 mg total) by mouth 2 (two) times daily., Disp: 180 capsule, Rfl: 1    lancing device (ACCU-CHEK SOFTCLIX LANCET DEV) Misc, Accu-chek FastClix  kit, Disp: 1 each, Rfl: 0    leflunomide (ARAVA) 10 MG Tab, Take 1 tablet (10 mg total) by mouth once daily., Disp: 30 tablet, Rfl: 2    levothyroxine (SYNTHROID) 50 MCG tablet, TAKE 1 TABLET (50 MCG TOTAL) BY MOUTH BEFORE BREAKFAST., Disp: 90 tablet, Rfl: 1    lisinopril (PRINIVIL,ZESTRIL) 20 MG tablet, TAKE 1 TABLET EVERY DAY, Disp: 90 tablet, Rfl: 1    LUBRICANT EYE DROPS, GLYC-PG, 1-0.3 % Drop, , Disp: , Rfl:     metFORMIN (GLUCOPHAGE) 1000 MG tablet, Take 1 tablet (1,000 mg total) by mouth 2 (two) times daily with meals., Disp: 180 tablet, Rfl: 1    ondansetron (ZOFRAN) 8 MG tablet, Take 1 tablet (8 mg total) by mouth every 12 (twelve) hours as needed for Nausea., Disp: 25 tablet, Rfl: 1    potassium chloride SA (K-DUR,KLOR-CON) 20 MEQ tablet, Take 1 tablet (20 mEq total) by mouth 2 (two) times daily., Disp: 180 tablet, Rfl: 1    predniSONE (DELTASONE) 10 MG tablet, TAKE 1 TABLET ONCE DAILY, Disp: 90 tablet, Rfl: 1  Does patient have record of home blood pressure readings no..   Last dose of blood pressure medication was taken at 7:00 this morning.  Patient is asymptomatic.   Complains of none.     Vitals:    01/11/18 0959   BP: 110/70   BP Location: Right arm   Patient Position: Sitting   BP Method: Small (Manual)   Pulse: 86         Dr. Mendoza notified.

## 2018-01-12 ENCOUNTER — INFUSION (OUTPATIENT)
Dept: INFUSION THERAPY | Facility: HOSPITAL | Age: 73
End: 2018-01-12
Attending: INTERNAL MEDICINE
Payer: MEDICARE

## 2018-01-12 VITALS — HEART RATE: 99 BPM | SYSTOLIC BLOOD PRESSURE: 133 MMHG | DIASTOLIC BLOOD PRESSURE: 72 MMHG | RESPIRATION RATE: 16 BRPM

## 2018-01-12 DIAGNOSIS — N18.9 ANEMIA IN CKD (CHRONIC KIDNEY DISEASE): Primary | ICD-10-CM

## 2018-01-12 DIAGNOSIS — D63.1 ANEMIA IN CKD (CHRONIC KIDNEY DISEASE): Primary | ICD-10-CM

## 2018-01-12 DIAGNOSIS — Z53.1 REFUSAL OF BLOOD TRANSFUSIONS AS PATIENT IS JEHOVAH'S WITNESS: ICD-10-CM

## 2018-01-12 PROCEDURE — 96372 THER/PROPH/DIAG INJ SC/IM: CPT

## 2018-01-12 PROCEDURE — 63600175 PHARM REV CODE 636 W HCPCS: Mod: JG | Performed by: INTERNAL MEDICINE

## 2018-01-12 RX ADMIN — ERYTHROPOIETIN 10000 UNITS: 10000 INJECTION, SOLUTION INTRAVENOUS; SUBCUTANEOUS at 10:01

## 2018-01-15 ENCOUNTER — PATIENT MESSAGE (OUTPATIENT)
Dept: FAMILY MEDICINE | Facility: CLINIC | Age: 73
End: 2018-01-15

## 2018-01-16 ENCOUNTER — OFFICE VISIT (OUTPATIENT)
Dept: HEMATOLOGY/ONCOLOGY | Facility: CLINIC | Age: 73
End: 2018-01-16
Payer: MEDICARE

## 2018-01-16 ENCOUNTER — TELEPHONE (OUTPATIENT)
Dept: FAMILY MEDICINE | Facility: CLINIC | Age: 73
End: 2018-01-16

## 2018-01-16 VITALS
HEIGHT: 60 IN | OXYGEN SATURATION: 95 % | TEMPERATURE: 98 F | WEIGHT: 130 LBS | BODY MASS INDEX: 25.52 KG/M2 | DIASTOLIC BLOOD PRESSURE: 63 MMHG | SYSTOLIC BLOOD PRESSURE: 114 MMHG | HEART RATE: 102 BPM

## 2018-01-16 DIAGNOSIS — N18.9 ANEMIA IN CHRONIC KIDNEY DISEASE, UNSPECIFIED CKD STAGE: Primary | ICD-10-CM

## 2018-01-16 DIAGNOSIS — D63.1 ANEMIA IN CHRONIC KIDNEY DISEASE, UNSPECIFIED CKD STAGE: Primary | ICD-10-CM

## 2018-01-16 DIAGNOSIS — Z53.1 REFUSAL OF BLOOD TRANSFUSIONS AS PATIENT IS JEHOVAH'S WITNESS: ICD-10-CM

## 2018-01-16 PROCEDURE — 99213 OFFICE O/P EST LOW 20 MIN: CPT | Mod: S$GLB,,, | Performed by: INTERNAL MEDICINE

## 2018-01-16 PROCEDURE — 99499 UNLISTED E&M SERVICE: CPT | Mod: S$GLB,,, | Performed by: INTERNAL MEDICINE

## 2018-01-16 PROCEDURE — 99999 PR PBB SHADOW E&M-EST. PATIENT-LVL IV: CPT | Mod: PBBFAC,,, | Performed by: INTERNAL MEDICINE

## 2018-01-16 NOTE — PROGRESS NOTES
Subjective:       Patient ID: Regino Lawrence is a 72 y.o. female.    Chief Complaint: Follow-up  Diagnosis: Anemia in CKD  Patient is a Islam  .  HPI    The patient is seen today for chronic anemia in CKD. The patient reports that she has been diagnosed with JOLLY in the past.  She has been on oral iron supplementation therapy, but could not tolerate or did not respond, she is uncertain.  She is followed by GI and has undergone a colonoscopy earlier this year and was diagnosed with hemorrhoids for which she underwent banding procedure.  No melena, hematochezia,change in bowel habits.  She has also been diagnosed with B12 deficiency in the past, but reports she did not respond to B12 injections. No history of blood transfusions.  She is a Islam.  She reports that she remembers getting injections in the 1970s when her blood count was low.        She is followed by Rheumatology for history of sarcoidosis with associated myopathy and arthropathy.   .She has been treated in the  past with methotrexate and Plaquenil, both of which were ineffective  Cellcept and imuran caused some unknown side effect.   Colchicine  held due to low WBC    Today, she has no new issues  She continues with  swelling in  hands and diffuse arthralgias  Chronic LBP- stable.   She ambulates with cane  Mild  fatigue  No SOB/CP/NV   Pt reports episode of loose stools  She has follow-up with her PCP in near future    She continues to undergo Procrit therapy q 2wks  She undergoes intermittent IV iron therapy        CBC 2018  reveals wbc 6800/mm3  Hb 10.6  g/dl Hct 32.3  % Plt ct 282k    PAST MEDICAL HISTORY:  Acid reflux, alopecia, anemia, anxiety disorder, chronic  kidney disease, depression, diabetes mellitus type 2, hyperlipidemia,  hypertension, hypothyroidism, osteoporosis, sarcoidosis.    PAST SURGICAL HISTORY:  Cholecystectomy, , tubal ligation, carpal tunnel release, cataracts.    FAMILY HISTORY:  Unremarkable for cancer. Significant for HTN.       Review of Systems   Constitutional: Negative for activity change, appetite change and fatigue.   HENT: Negative for hearing loss and nosebleeds.    Eyes: Negative for visual disturbance.   Respiratory: Negative for cough and shortness of breath.    Cardiovascular: Negative for chest pain and leg swelling.   Gastrointestinal: Negative for abdominal pain, constipation, diarrhea and nausea.   Genitourinary: Negative for flank pain and urgency.   Musculoskeletal: Positive for arthralgias, back pain, gait problem and joint swelling.   Skin: Negative for rash.        No petechiae, ecchymoses   Neurological: Negative for light-headedness and headaches.   Hematological: Negative for adenopathy. Does not bruise/bleed easily.       Objective:       Vitals:    01/16/18 0919   BP: 114/63   BP Location: Left arm   Patient Position: Sitting   BP Method: Medium (Automatic)   Pulse: 102   Temp: 98.4 °F (36.9 °C)   TempSrc: Oral   SpO2: 95%   Weight: 59 kg (130 lb)   Height: 5' (1.524 m)       Physical Exam   Constitutional: She is oriented to person, place, and time. She appears well-developed and well-nourished.   HENT:   Head: Normocephalic.   Mouth/Throat: Oropharynx is clear and moist. No oropharyngeal exudate.   Eyes: Conjunctivae and lids are normal. Pupils are equal, round, and reactive to light. No scleral icterus.   Neck: Normal range of motion. Neck supple. No thyromegaly present.   Cardiovascular: Normal rate, regular rhythm and normal heart sounds.    No murmur heard.  Pulmonary/Chest: Breath sounds normal. She has no wheezes. She has no rales.   Abdominal: Soft. Bowel sounds are normal. She exhibits no distension and no mass. There is no hepatosplenomegaly. There is no tenderness. There is no rebound and no guarding.   Musculoskeletal: Normal range of motion. She exhibits no edema or tenderness.   Lymphadenopathy:     She has no cervical adenopathy.     She has no  axillary adenopathy.        Right: No supraclavicular adenopathy present.        Left: No supraclavicular adenopathy present.   Neurological: She is alert and oriented to person, place, and time. No cranial nerve deficit. Coordination normal.   Skin: Skin is warm and dry. No ecchymosis, no petechiae and no rash noted. No erythema.   Psychiatric: She has a normal mood and affect.             Lab Results   Component Value Date    WBC 6.80 01/12/2018    HGB 10.6 (L) 01/12/2018    HCT 32.3 (L) 01/12/2018    MCV 97 01/12/2018     01/12/2018     Lab Results   Component Value Date    IRON 44 01/12/2018    TIBC 357 01/12/2018    FERRITIN 117 01/12/2018     SPEP-nl          CT renal 3/6/2017   IMPRESSION:  1.  No renal, ureteral or bladder calculi.  No hydronephrosis or ureterectasis.  2.  Poorly distended bladder.  Mild bladder wall prominence.  Mild cystitis cannot be   excluded.  3.  Moderate constipation.  Normal appendix      Lab Results   Component Value Date    IRON 44 01/12/2018    TIBC 357 01/12/2018    FERRITIN 117 01/12/2018       Assessment:       1. Anemia in chronic kidney disease, unspecified CKD stage    2. Refusal of blood transfusions as patient is Gnosticism        Plan:   1-2 Pt clinically stable  Hb 10.6 g/dl -stable  Ferritin 117  Pt is a Yazdanism and declines/not interested in blood and blood products due to Hinduism beliefs  Cont  Procrit therapy  q 2wks ( pending lab parameters)-STANDING    Follow-up with PCP for med mgmt    F/u 2 mos with Fe studies        CC: Micaela Mendoza M.D.

## 2018-01-16 NOTE — TELEPHONE ENCOUNTER
----- Message from Binu Johnston MD sent at 1/16/2018  3:20 PM CST -----  Contact: self      ----- Message -----  From: Rc Amezcua MA  Sent: 1/16/2018   2:48 PM  To: Binu Johnston MD        ----- Message -----  From: Belem Reyes  Sent: 1/16/2018  12:32 PM  To: Vickie Schmid Staff    Patient called requesting medication for sinus infection. Offered pt 2:00pm OV,  declined and wanted message sent.Please contact her at [phone].    Thanks!

## 2018-01-22 ENCOUNTER — OFFICE VISIT (OUTPATIENT)
Dept: FAMILY MEDICINE | Facility: CLINIC | Age: 73
End: 2018-01-22
Payer: MEDICARE

## 2018-01-22 ENCOUNTER — LAB VISIT (OUTPATIENT)
Dept: LAB | Facility: HOSPITAL | Age: 73
End: 2018-01-22
Attending: FAMILY MEDICINE
Payer: MEDICARE

## 2018-01-22 VITALS
HEIGHT: 60 IN | TEMPERATURE: 98 F | BODY MASS INDEX: 24.37 KG/M2 | DIASTOLIC BLOOD PRESSURE: 82 MMHG | OXYGEN SATURATION: 95 % | HEART RATE: 103 BPM | WEIGHT: 124.13 LBS | SYSTOLIC BLOOD PRESSURE: 142 MMHG | RESPIRATION RATE: 20 BRPM

## 2018-01-22 DIAGNOSIS — I70.0 CALCIFICATION OF AORTA: ICD-10-CM

## 2018-01-22 DIAGNOSIS — J34.89 FRONTAL SINUS PAIN: ICD-10-CM

## 2018-01-22 DIAGNOSIS — E11.8 CONTROLLED TYPE 2 DIABETES MELLITUS WITH COMPLICATION, WITHOUT LONG-TERM CURRENT USE OF INSULIN: Chronic | ICD-10-CM

## 2018-01-22 DIAGNOSIS — R11.0 NAUSEA: ICD-10-CM

## 2018-01-22 DIAGNOSIS — I10 ESSENTIAL HYPERTENSION: Chronic | ICD-10-CM

## 2018-01-22 DIAGNOSIS — E11.8 CONTROLLED TYPE 2 DIABETES MELLITUS WITH COMPLICATION, WITHOUT LONG-TERM CURRENT USE OF INSULIN: Primary | Chronic | ICD-10-CM

## 2018-01-22 DIAGNOSIS — E86.0 DEHYDRATION: ICD-10-CM

## 2018-01-22 DIAGNOSIS — R10.9 FLANK PAIN: ICD-10-CM

## 2018-01-22 DIAGNOSIS — D84.9 IMMUNOSUPPRESSION: Chronic | ICD-10-CM

## 2018-01-22 LAB
BACTERIA #/AREA URNS AUTO: ABNORMAL /HPF
BILIRUB UR QL STRIP: NEGATIVE
CLARITY UR REFRACT.AUTO: ABNORMAL
COLOR UR AUTO: YELLOW
ESTIMATED AVG GLUCOSE: 126 MG/DL
GLUCOSE UR QL STRIP: NEGATIVE
HBA1C MFR BLD HPLC: 6 %
HGB UR QL STRIP: NEGATIVE
HYALINE CASTS UR QL AUTO: 3 /LPF
KETONES UR QL STRIP: NEGATIVE
LEUKOCYTE ESTERASE UR QL STRIP: ABNORMAL
MICROSCOPIC COMMENT: ABNORMAL
NITRITE UR QL STRIP: NEGATIVE
PH UR STRIP: 7 [PH] (ref 5–8)
PROT UR QL STRIP: NEGATIVE
RBC #/AREA URNS AUTO: 2 /HPF (ref 0–4)
SP GR UR STRIP: 1.01 (ref 1–1.03)
SQUAMOUS #/AREA URNS AUTO: 11 /HPF
URN SPEC COLLECT METH UR: ABNORMAL
UROBILINOGEN UR STRIP-ACNC: NEGATIVE EU/DL
WBC #/AREA URNS AUTO: 11 /HPF (ref 0–5)

## 2018-01-22 PROCEDURE — 99999 PR PBB SHADOW E&M-EST. PATIENT-LVL IV: CPT | Mod: PBBFAC,,, | Performed by: FAMILY MEDICINE

## 2018-01-22 PROCEDURE — 99499 UNLISTED E&M SERVICE: CPT | Mod: S$GLB,,, | Performed by: FAMILY MEDICINE

## 2018-01-22 PROCEDURE — 87086 URINE CULTURE/COLONY COUNT: CPT

## 2018-01-22 PROCEDURE — 81001 URINALYSIS AUTO W/SCOPE: CPT

## 2018-01-22 PROCEDURE — 83036 HEMOGLOBIN GLYCOSYLATED A1C: CPT

## 2018-01-22 PROCEDURE — 99214 OFFICE O/P EST MOD 30 MIN: CPT | Mod: S$GLB,,, | Performed by: FAMILY MEDICINE

## 2018-01-22 PROCEDURE — 36415 COLL VENOUS BLD VENIPUNCTURE: CPT | Mod: PO

## 2018-01-22 RX ORDER — ONDANSETRON HYDROCHLORIDE 8 MG/1
8 TABLET, FILM COATED ORAL EVERY 8 HOURS PRN
Qty: 25 TABLET | Refills: 1 | Status: ON HOLD | OUTPATIENT
Start: 2018-01-22 | End: 2018-10-08 | Stop reason: HOSPADM

## 2018-01-22 NOTE — PATIENT INSTRUCTIONS
May take anti diarrheal 30 min before meals  Diet for Vomiting or Diarrhea (Adult)    Your symptoms may return or get worse after eating certain foods listed below. If this happens, stop eating these foods until your symptoms ease and you feel better.  Once the vomiting stops, follow the steps below.   During the first 12 to 24 hours  During the first 12 to 24 hours, follow this diet:  · Drinks. Plain water, sport drinks like electrolyte solutions, soft drinks without caffeine, mineral water (plain or flavored), clear fruit juices, and decaffeinated tea and coffee.  · Soups. Clear broth.  · Desserts. Plain gelatin, popsicles, and fruit juice bars. As you feel better, you may add 6 to 8 ounces of yogurt per day. If you have diarrhea, don't have foods or drinks that contain sugar, high-fructose corn syrup, or sugar alcohols.  During the next 24 hours  During the next 24 hours you may add the following to the above:  · Hot cereal, plain toast, bread, rolls, and crackers  · Plain noodles, rice, mashed potatoes, and chicken noodle or rice soup  · Unsweetened canned fruit (but not pineapple) and bananas  Don't eat more than 15 grams of fat a day. Do this by staying away from margarine, butter, oils, mayonnaise, sauces, gravies, fried foods, peanut butter, meat, poultry, and fish.  Don't eat much fiber. Stay away from raw or cooked vegetables, fresh fruits (except bananas), and bran cereals.  Limit how much caffeine and chocolate you have. Do not use any spices or seasonings except salt.  During the next 24 hours  Slowly go back to your normal diet, as you feel better and your symptoms ease.  Date Last Reviewed: 8/1/2016  © 2241-7130 [employer]. [address] [place] [address], [place] [address]. All rights reserved. This information is not intended as a substitute for professional medical care. Always follow your healthcare professional's instructions.

## 2018-01-22 NOTE — PROGRESS NOTES
Chief Complaint   Patient presents with    Diarrhea    Back Pain    Fever    Nausea    Generalized Body Aches    Chills       HPI  Regino Lawrence is a 72 y.o. female with multiple medical diagnoses as listed in the medical history and problem list that presents for evaluation for problems that started with leflunomide which elevated her pressure and it has been running high. She has stopped taking the medication. She has been having pain in her back and side that radiates to the front. She has had diarrhea since  with cramping and pain. She is feeling weak and unable to keep anything down. She has been having nausea and body aches. She has had some nasal congestion and dry mouth.    She has had some recent blood sugar elevations in the 200s that are not usual for her. She has taken three metformin at one time to bring them down.    PAST MEDICAL HISTORY:  Past Medical History:   Diagnosis Date    Acid reflux     Allergy     Alopecia     Anemia     Anxiety     Arthritis     Back pain     Cataract     Chronic kidney disease     Depression     Diabetes mellitus, type 2     Eye injury as a child     k-abrasion  od    Hyperlipidemia     Hypertension     Hypothyroidism     Immune deficiency disorder     Immune disorder     Myalgia and myositis 2012    Osteoporosis     Polyneuropathy     Renal manifestation of secondary diabetes mellitus     Sarcoidosis     Ulcer     no cancer    Urinary incontinence        PAST SURGICAL HISTORY:  Past Surgical History:   Procedure Laterality Date    CARPAL TUNNEL RELEASE      Rt wrist    CATARACT EXTRACTION W/  INTRAOCULAR LENS IMPLANT Right 2015    Dr. Azevedo    CATARACT EXTRACTION W/  INTRAOCULAR LENS IMPLANT Left 2015    Dr. Azevedo     SECTION      CHOLECYSTECTOMY      TUBAL LIGATION         SOCIAL HISTORY:  Social History     Social History    Marital status:      Spouse name: N/A    Number of  children: N/A    Years of education: N/A     Occupational History    Not on file.     Social History Main Topics    Smoking status: Never Smoker    Smokeless tobacco: Never Used    Alcohol use No    Drug use: No    Sexual activity: Yes     Partners: Male     Other Topics Concern    Not on file     Social History Narrative    No narrative on file       FAMILY HISTORY:  Family History   Problem Relation Age of Onset    Hypertension Mother     Cataracts Mother     No Known Problems Father     Hypertension Maternal Grandmother     Glaucoma Sister     Arthritis Sister     No Known Problems Brother     No Known Problems Maternal Aunt     No Known Problems Maternal Uncle     No Known Problems Paternal Aunt     No Known Problems Paternal Uncle     No Known Problems Maternal Grandfather     No Known Problems Paternal Grandmother     No Known Problems Paternal Grandfather     Kidney failure Sister     Hepatitis Sister     Cancer Sister      bone cancer     Immunodeficiency Sister     Lupus Neg Hx     Rheum arthritis Neg Hx     Amblyopia Neg Hx     Blindness Neg Hx     Diabetes Neg Hx     Macular degeneration Neg Hx     Retinal detachment Neg Hx     Strabismus Neg Hx     Stroke Neg Hx     Thyroid disease Neg Hx     Endometrial cancer Neg Hx     Vaginal cancer Neg Hx     Cervical cancer Neg Hx        ALLERGIES AND MEDICATIONS: updated and reviewed.  Review of patient's allergies indicates:   Allergen Reactions    Azathioprine Shortness Of Breath and Other (See Comments)     Fatigue     Current Outpatient Prescriptions   Medication Sig Dispense Refill    ACCU-CHEK FASTCLIX Kit USE AS DIRECTED. 1 each 0    ALCOHOL ANTISEPTIC PADS (ALCOHOL PREP PADS TOP)       amLODIPine (NORVASC) 10 MG tablet Take 0.5 tablets (5 mg total) by mouth once daily. 90 tablet 1    blood sugar diagnostic Strp 1 each by Misc.(Non-Drug; Combo Route) route once daily. 100 strip 11    blood-glucose meter kit Use  as instructed 1 each 0    calcium carbonate (OS-MALCOLM) 500 mg calcium (1,250 mg) tablet Take 1 tablet (500 mg total) by mouth 2 (two) times daily.  0    carvedilol (COREG) 12.5 MG tablet Take 1 tablet (12.5 mg total) by mouth 2 (two) times daily. 180 tablet 1    cetirizine (ZYRTEC) 10 MG tablet       cloNIDine (CATAPRES) 0.3 MG tablet Take 1 tablet (0.3 mg total) by mouth 3 (three) times daily. 270 tablet 1    cyclobenzaprine (FLEXERIL) 10 MG tablet Take 1 tablet (10 mg total) by mouth 3 (three) times daily as needed for Muscle spasms. 270 tablet 0    fenofibrate 160 MG Tab TAKE 1 TABLET EVERY DAY 90 tablet 1    gabapentin (NEURONTIN) 400 MG capsule Take 1 capsule (400 mg total) by mouth 2 (two) times daily. 180 capsule 1    lancing device (ACCU-CHEK SOFTCLIX LANCET DEV) Misc Accu-chek FastClix kit 1 each 0    leflunomide (ARAVA) 10 MG Tab Take 1 tablet (10 mg total) by mouth once daily. 30 tablet 2    levothyroxine (SYNTHROID) 50 MCG tablet TAKE 1 TABLET (50 MCG TOTAL) BY MOUTH BEFORE BREAKFAST. 90 tablet 1    lisinopril (PRINIVIL,ZESTRIL) 20 MG tablet TAKE 1 TABLET EVERY DAY 90 tablet 1    LUBRICANT EYE DROPS, GLYC-PG, 1-0.3 % Drop       metFORMIN (GLUCOPHAGE) 1000 MG tablet Take 1 tablet (1,000 mg total) by mouth 2 (two) times daily with meals. 180 tablet 1    ondansetron (ZOFRAN) 8 MG tablet Take 1 tablet (8 mg total) by mouth every 8 (eight) hours as needed for Nausea. 25 tablet 1    potassium chloride SA (K-DUR,KLOR-CON) 20 MEQ tablet Take 1 tablet (20 mEq total) by mouth 2 (two) times daily. 180 tablet 1    predniSONE (DELTASONE) 10 MG tablet TAKE 1 TABLET ONCE DAILY 90 tablet 1     No current facility-administered medications for this visit.        ROS  Review of Systems   Constitutional: Positive for fatigue. Negative for chills, diaphoresis, fever and unexpected weight change.   HENT: Negative for rhinorrhea, sinus pressure, sore throat and tinnitus.    Eyes: Negative for photophobia and  visual disturbance.   Respiratory: Negative for cough, shortness of breath and wheezing.    Cardiovascular: Negative for chest pain and palpitations.   Gastrointestinal: Positive for diarrhea and nausea. Negative for abdominal pain, blood in stool, constipation and vomiting.   Genitourinary: Positive for flank pain. Negative for dysuria, frequency and vaginal discharge.   Musculoskeletal: Negative for arthralgias and joint swelling.   Skin: Negative for rash.   Neurological: Positive for weakness. Negative for speech difficulty, light-headedness and headaches.   Psychiatric/Behavioral: Negative for behavioral problems and dysphoric mood.       Physical Exam  Vitals:    01/22/18 1151 01/22/18 1241   BP: (!) 146/92 (!) 142/82   Pulse: 103    Resp: 20    Temp: 98.1 °F (36.7 °C)    TempSrc: Oral    SpO2: 95%    Weight: 56.3 kg (124 lb 1.9 oz)    Height: 5' (1.524 m)     Body mass index is 24.24 kg/m².  Weight: 56.3 kg (124 lb 1.9 oz)   Height: 5' (152.4 cm)     Physical Exam   Constitutional: She is oriented to person, place, and time. She appears well-developed and well-nourished. No distress.   Eyes: EOM are normal.   Neck: Neck supple.   Cardiovascular: Normal rate and regular rhythm.  Exam reveals no gallop and no friction rub.    No murmur heard.  Pulmonary/Chest: Effort normal and breath sounds normal. No respiratory distress. She has no wheezes. She has no rales.   Abdominal: Soft. She exhibits no distension. There is no tenderness.   Lymphadenopathy:     She has no cervical adenopathy.   Neurological: She is alert and oriented to person, place, and time.   Skin: Skin is warm and dry. No rash noted. No erythema.   Psychiatric: She has a normal mood and affect. Her behavior is normal.   Nursing note and vitals reviewed.      Health Maintenance       Date Due Completion Date    Hemoglobin A1c 12/30/2017 6/30/2017    Mammogram 04/18/2018 4/18/2017    Lipid Panel 05/08/2018 5/8/2017    Foot Exam 05/12/2018 5/12/2017  (Done)    Override on 5/12/2017: Done    Urine Microalbumin 05/18/2018 5/18/2017    Eye Exam 06/29/2018 6/29/2017    DEXA SCAN 05/05/2020 5/5/2017    Colonoscopy 02/09/2025 2/9/2015 (Done)    Override on 2/9/2015: Done    Override on 2/18/2005: Done (reportedly normal)    TETANUS VACCINE 05/16/2026 5/16/2016            ASSESSMENT     1. Controlled type 2 diabetes mellitus with complication, without long-term current use of insulin    2. Flank pain    3. Immunosuppression    4. Essential hypertension    5. Dehydration    6. Nausea    7. Frontal sinus pain        PLAN:     Problem List Items Addressed This Visit        Cardiac/Vascular    Essential hypertension (Chronic)  -This is a chronic medical condition that is stable under the current regimen. Continue to monitor and we will make medication adjustments as needed.          Immunology/Multi System    Immunosuppression (Chronic)  -she is monitored by Rheumatology and continues to follow up with them for this condition       Endocrine    Diabetes mellitus type 2, controlled - Primary (Chronic)  -likely elevated due to illness    Relevant Orders    Hemoglobin A1c      Other Visit Diagnoses     Flank pain     -rule out UTI     Relevant Orders    Urine culture    Urinalysis    Dehydration      -discussed hydration with sports drinks and water, drink plenty of chicken broth    Nausea      -may take zofran for nausea and imodium 30 min prior to eating to prevent diarrhea.   -take according to package instructions    Relevant Medications    ondansetron (ZOFRAN) 8 MG tablet    Frontal sinus pain      -she is having this but no sinus pressure, if this worsens will treat with abx; she will begin using her nasal spray            Micaela Mendoza MD  01/22/2018 12:15 PM        Follow-up in about 3 months (around 4/22/2018) for Follow up.

## 2018-01-23 LAB
BACTERIA UR CULT: NORMAL
BACTERIA UR CULT: NORMAL

## 2018-01-24 ENCOUNTER — TELEPHONE (OUTPATIENT)
Dept: FAMILY MEDICINE | Facility: CLINIC | Age: 73
End: 2018-01-24

## 2018-01-24 DIAGNOSIS — N30.00 ACUTE CYSTITIS WITHOUT HEMATURIA: Primary | ICD-10-CM

## 2018-01-24 RX ORDER — CIPROFLOXACIN 500 MG/1
500 TABLET ORAL 2 TIMES DAILY
Qty: 20 TABLET | Refills: 0 | Status: SHIPPED | OUTPATIENT
Start: 2018-01-24 | End: 2018-01-31

## 2018-01-24 NOTE — TELEPHONE ENCOUNTER
Please let her know I am sending an antibiotic to her pharmacy as she has a lot of bacteria in her urine. Is her diarrhea improving.

## 2018-01-26 ENCOUNTER — INFUSION (OUTPATIENT)
Dept: INFUSION THERAPY | Facility: HOSPITAL | Age: 73
End: 2018-01-26
Attending: INTERNAL MEDICINE
Payer: MEDICARE

## 2018-01-26 DIAGNOSIS — D63.1 ANEMIA IN CKD (CHRONIC KIDNEY DISEASE): Primary | ICD-10-CM

## 2018-01-26 DIAGNOSIS — Z53.1 REFUSAL OF BLOOD TRANSFUSIONS AS PATIENT IS JEHOVAH'S WITNESS: ICD-10-CM

## 2018-01-26 DIAGNOSIS — N18.9 ANEMIA IN CKD (CHRONIC KIDNEY DISEASE): Primary | ICD-10-CM

## 2018-01-26 PROCEDURE — 63600175 PHARM REV CODE 636 W HCPCS: Mod: JG | Performed by: INTERNAL MEDICINE

## 2018-01-26 PROCEDURE — 96372 THER/PROPH/DIAG INJ SC/IM: CPT

## 2018-01-26 RX ADMIN — ERYTHROPOIETIN 10000 UNITS: 10000 INJECTION, SOLUTION INTRAVENOUS; SUBCUTANEOUS at 11:01

## 2018-02-05 ENCOUNTER — OFFICE VISIT (OUTPATIENT)
Dept: FAMILY MEDICINE | Facility: CLINIC | Age: 73
End: 2018-02-05
Payer: MEDICARE

## 2018-02-05 VITALS
HEART RATE: 95 BPM | WEIGHT: 126.31 LBS | HEIGHT: 60 IN | DIASTOLIC BLOOD PRESSURE: 84 MMHG | OXYGEN SATURATION: 97 % | BODY MASS INDEX: 24.8 KG/M2 | SYSTOLIC BLOOD PRESSURE: 150 MMHG | TEMPERATURE: 99 F

## 2018-02-05 DIAGNOSIS — I10 ESSENTIAL HYPERTENSION: Chronic | ICD-10-CM

## 2018-02-05 DIAGNOSIS — E11.8 CONTROLLED TYPE 2 DIABETES MELLITUS WITH COMPLICATION, WITHOUT LONG-TERM CURRENT USE OF INSULIN: Chronic | ICD-10-CM

## 2018-02-05 DIAGNOSIS — E11.22 DIABETES MELLITUS WITH STAGE 3 CHRONIC KIDNEY DISEASE: Chronic | ICD-10-CM

## 2018-02-05 DIAGNOSIS — R53.1 WEAKNESS: Primary | ICD-10-CM

## 2018-02-05 DIAGNOSIS — N18.30 DIABETES MELLITUS WITH STAGE 3 CHRONIC KIDNEY DISEASE: Chronic | ICD-10-CM

## 2018-02-05 DIAGNOSIS — E11.69 COMBINED HYPERLIPIDEMIA ASSOCIATED WITH TYPE 2 DIABETES MELLITUS: Chronic | ICD-10-CM

## 2018-02-05 DIAGNOSIS — R63.0 DECREASED APPETITE: ICD-10-CM

## 2018-02-05 DIAGNOSIS — E78.2 COMBINED HYPERLIPIDEMIA ASSOCIATED WITH TYPE 2 DIABETES MELLITUS: Chronic | ICD-10-CM

## 2018-02-05 PROCEDURE — 3008F BODY MASS INDEX DOCD: CPT | Mod: S$GLB,,, | Performed by: FAMILY MEDICINE

## 2018-02-05 PROCEDURE — 1159F MED LIST DOCD IN RCRD: CPT | Mod: S$GLB,,, | Performed by: FAMILY MEDICINE

## 2018-02-05 PROCEDURE — 99999 PR PBB SHADOW E&M-EST. PATIENT-LVL III: CPT | Mod: PBBFAC,,, | Performed by: FAMILY MEDICINE

## 2018-02-05 PROCEDURE — 99499 UNLISTED E&M SERVICE: CPT | Mod: S$GLB,,, | Performed by: FAMILY MEDICINE

## 2018-02-05 PROCEDURE — 99214 OFFICE O/P EST MOD 30 MIN: CPT | Mod: S$GLB,,, | Performed by: FAMILY MEDICINE

## 2018-02-05 PROCEDURE — 1126F AMNT PAIN NOTED NONE PRSNT: CPT | Mod: S$GLB,,, | Performed by: FAMILY MEDICINE

## 2018-02-05 RX ORDER — LISINOPRIL 40 MG/1
40 TABLET ORAL DAILY
Qty: 90 TABLET | Refills: 1 | Status: SHIPPED | OUTPATIENT
Start: 2018-02-05 | End: 2018-03-22 | Stop reason: SDUPTHER

## 2018-02-05 RX ORDER — TRAMADOL HYDROCHLORIDE 50 MG/1
100 TABLET ORAL
Qty: 180 TABLET | Refills: 2 | Status: SHIPPED | OUTPATIENT
Start: 2018-02-05 | End: 2018-02-15

## 2018-02-05 NOTE — PROGRESS NOTES
Chief Complaint   Patient presents with    Hypertension     Hypertension       HPI  Regino Lawrence is a 72 y.o. female with multiple medical diagnoses as listed in the medical history and problem list that presents for follow-up for hypertension, diarrhea and weakness. She has noted her pressure has still been high, this Am it was 160/95 and she woke up with a headache. No blurred vision, no TIA symptoms. She has noted that with the abx her diarrhea stopped. She has still not been eating much and is feeling weak. She is feeling more short of breath than normal. She has labs this Friday and will get a procrit infusion for her anemia if it is too low. Her pain medication dose does help control her pain, which has been bad over the past few weeks.     PAST MEDICAL HISTORY:  Past Medical History:   Diagnosis Date    Acid reflux     Allergy     Alopecia     Anemia     Anxiety     Arthritis     Back pain     Cataract     Chronic kidney disease     Depression     Diabetes mellitus, type 2     Eye injury as a child     k-abrasion  od    Hyperlipidemia     Hypertension     Hypothyroidism     Immune deficiency disorder     Immune disorder     Myalgia and myositis 2012    Osteoporosis     Polyneuropathy     Renal manifestation of secondary diabetes mellitus     Sarcoidosis     Ulcer     no cancer    Urinary incontinence        PAST SURGICAL HISTORY:  Past Surgical History:   Procedure Laterality Date    CARPAL TUNNEL RELEASE      Rt wrist    CATARACT EXTRACTION W/  INTRAOCULAR LENS IMPLANT Right 2015    Dr. Azevedo    CATARACT EXTRACTION W/  INTRAOCULAR LENS IMPLANT Left 2015    Dr. Azevedo     SECTION      CHOLECYSTECTOMY      TUBAL LIGATION         SOCIAL HISTORY:  Social History     Social History    Marital status:      Spouse name: N/A    Number of children: N/A    Years of education: N/A     Occupational History    Not on file.     Social History Main  Topics    Smoking status: Never Smoker    Smokeless tobacco: Never Used    Alcohol use No    Drug use: No    Sexual activity: Yes     Partners: Male     Other Topics Concern    Not on file     Social History Narrative    No narrative on file       FAMILY HISTORY:  Family History   Problem Relation Age of Onset    Hypertension Mother     Cataracts Mother     No Known Problems Father     Hypertension Maternal Grandmother     Glaucoma Sister     Arthritis Sister     No Known Problems Brother     No Known Problems Maternal Aunt     No Known Problems Maternal Uncle     No Known Problems Paternal Aunt     No Known Problems Paternal Uncle     No Known Problems Maternal Grandfather     No Known Problems Paternal Grandmother     No Known Problems Paternal Grandfather     Kidney failure Sister     Hepatitis Sister     Cancer Sister      bone cancer     Immunodeficiency Sister     Lupus Neg Hx     Rheum arthritis Neg Hx     Amblyopia Neg Hx     Blindness Neg Hx     Diabetes Neg Hx     Macular degeneration Neg Hx     Retinal detachment Neg Hx     Strabismus Neg Hx     Stroke Neg Hx     Thyroid disease Neg Hx     Endometrial cancer Neg Hx     Vaginal cancer Neg Hx     Cervical cancer Neg Hx        ALLERGIES AND MEDICATIONS: updated and reviewed.  Review of patient's allergies indicates:   Allergen Reactions    Azathioprine Shortness Of Breath and Other (See Comments)     Fatigue     Current Outpatient Prescriptions   Medication Sig Dispense Refill    ACCU-CHEK FASTCLIX Kit USE AS DIRECTED. 1 each 0    ALCOHOL ANTISEPTIC PADS (ALCOHOL PREP PADS TOP)       amLODIPine (NORVASC) 10 MG tablet Take 0.5 tablets (5 mg total) by mouth once daily. 90 tablet 1    blood sugar diagnostic Strp 1 each by Misc.(Non-Drug; Combo Route) route once daily. 100 strip 11    blood-glucose meter kit Use as instructed 1 each 0    carvedilol (COREG) 12.5 MG tablet Take 1 tablet (12.5 mg total) by mouth 2 (two)  times daily. 180 tablet 1    cetirizine (ZYRTEC) 10 MG tablet       cloNIDine (CATAPRES) 0.3 MG tablet Take 1 tablet (0.3 mg total) by mouth 3 (three) times daily. 270 tablet 1    cyclobenzaprine (FLEXERIL) 10 MG tablet Take 1 tablet (10 mg total) by mouth 3 (three) times daily as needed for Muscle spasms. 270 tablet 0    fenofibrate 160 MG Tab TAKE 1 TABLET EVERY DAY 90 tablet 1    lancing device (ACCU-CHEK SOFTCLIX LANCET DEV) Misc Accu-chek FastClix kit 1 each 0    leflunomide (ARAVA) 10 MG Tab Take 1 tablet (10 mg total) by mouth once daily. 30 tablet 2    levothyroxine (SYNTHROID) 50 MCG tablet TAKE 1 TABLET (50 MCG TOTAL) BY MOUTH BEFORE BREAKFAST. 90 tablet 1    LUBRICANT EYE DROPS, GLYC-PG, 1-0.3 % Drop       metFORMIN (GLUCOPHAGE) 1000 MG tablet Take 1 tablet (1,000 mg total) by mouth 2 (two) times daily with meals. 180 tablet 1    ondansetron (ZOFRAN) 8 MG tablet Take 1 tablet (8 mg total) by mouth every 8 (eight) hours as needed for Nausea. 25 tablet 1    potassium chloride SA (K-DUR,KLOR-CON) 20 MEQ tablet Take 1 tablet (20 mEq total) by mouth 2 (two) times daily. 180 tablet 1    predniSONE (DELTASONE) 10 MG tablet TAKE 1 TABLET ONCE DAILY 90 tablet 1    calcium carbonate (OS-MALCOLM) 500 mg calcium (1,250 mg) tablet Take 1 tablet (500 mg total) by mouth 2 (two) times daily.  0    gabapentin (NEURONTIN) 400 MG capsule Take 1 capsule (400 mg total) by mouth 2 (two) times daily. 180 capsule 1    lisinopril (PRINIVIL,ZESTRIL) 40 MG tablet Take 1 tablet (40 mg total) by mouth once daily. 90 tablet 1    traMADol (ULTRAM) 50 mg tablet Take 2 tablets (100 mg total) by mouth every 4 to 6 hours as needed for Pain. 180 tablet 2     No current facility-administered medications for this visit.        ROS  Review of Systems   Constitutional: Negative for chills, diaphoresis, fatigue, fever and unexpected weight change.   HENT: Negative for rhinorrhea, sinus pressure, sore throat and tinnitus.    Eyes:  Negative for photophobia and visual disturbance.   Respiratory: Positive for shortness of breath. Negative for cough and wheezing.    Cardiovascular: Negative for chest pain and palpitations.   Gastrointestinal: Positive for abdominal distention. Negative for abdominal pain, blood in stool, constipation, diarrhea, nausea and vomiting.   Genitourinary: Negative for dysuria, flank pain, frequency and vaginal discharge.   Musculoskeletal: Positive for arthralgias. Negative for joint swelling.   Skin: Negative for rash.   Neurological: Positive for weakness. Negative for speech difficulty, light-headedness and headaches.   Psychiatric/Behavioral: Negative for behavioral problems and dysphoric mood.       Physical Exam  Vitals:    02/05/18 1159 02/05/18 1227   BP: (!) 154/88 (!) 150/84   BP Location: Right arm Right arm   Patient Position: Sitting Sitting   Pulse: 95    Temp: 98.7 °F (37.1 °C)    TempSrc: Oral    SpO2: 97%    Weight: 57.3 kg (126 lb 5.2 oz)    Height: 5' (1.524 m)     Body mass index is 24.67 kg/m².  Weight: 57.3 kg (126 lb 5.2 oz)   Height: 5' (152.4 cm)     Physical Exam   Constitutional: She is oriented to person, place, and time. She appears well-developed and well-nourished. No distress.   HENT:   Head: Normocephalic and atraumatic.   Eyes: EOM are normal.   Neck: Neck supple.   Cardiovascular: Normal rate and regular rhythm.  Exam reveals no gallop and no friction rub.    No murmur heard.  Pulmonary/Chest: Effort normal and breath sounds normal. No respiratory distress. She has no wheezes. She has no rales.   Abdominal: Soft. Bowel sounds are normal. She exhibits distension. She exhibits no mass. There is no tenderness. There is no guarding.   Neurological: She is alert and oriented to person, place, and time.   Skin: Skin is warm and dry. No rash noted.   Psychiatric: She has a normal mood and affect. Her behavior is normal.   Nursing note and vitals reviewed.      Health Maintenance       Date  Due Completion Date    Sign Pain Contract 10/29/1963 ---    Complete Opioid Risk Tool 10/29/1963 ---    Mammogram 04/18/2018 4/18/2017    Lipid Panel 05/08/2018 5/8/2017    Foot Exam 05/12/2018 5/12/2017 (Done)    Override on 5/12/2017: Done    Urine Microalbumin 05/18/2018 5/18/2017    Eye Exam 06/29/2018 6/29/2017    Hemoglobin A1c 07/22/2018 1/22/2018    DEXA SCAN 05/05/2020 5/5/2017    Colonoscopy 02/09/2025 2/9/2015 (Done)    Override on 2/9/2015: Done    Override on 2/18/2005: Done (reportedly normal)    TETANUS VACCINE 05/16/2026 5/16/2016            ASSESSMENT     1. Weakness    2. Essential hypertension    3. Combined 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    4. Patient is Uatsdin    5. Decreased appetite    6. Controlled type 2 diabetes mellitus with complication, without long-term current use of insulin    7. Diabetes mellitus with stage 3 chronic kidney disease        PLAN:     Problem List Items Addressed This Visit        Psychiatric    Patient is Uatsdin  -she does not receive blood transfusions       Cardiac/Vascular    Combined 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 (Chronic)  -This is a chronic medical condition that is stable under the current regimen. Continue to monitor and we will make medication adjustments as needed.       Essential hypertension (Chronic)  -increase her lisinopril to 40mg, continue to monitor, likely exacerbated by pain and her feeling unwell       Endocrine    Diabetes mellitus type 2, controlled (Chronic)  -sugars have been in the 200s, but this is likely due to her illness    Diabetes mellitus with stage 3 chronic kidney disease (Chronic)  -continue to monitor, recheck Cr after lisinopril increase      Other Visit Diagnoses     Weakness    -  Primary  -this is likely from her anemia, she has labwork this week and usually feels better after her procrit injections  -needs to increase oral intake    Decreased appetite       -add glucerna shakes to increase calories and protein, she is recovering from dehydration from the diarrhea            Micaela Mendoza MD  02/05/2018 1:39 PM        Follow-up in about 1 month (around 3/5/2018) for Follow up.

## 2018-02-06 ENCOUNTER — TELEPHONE (OUTPATIENT)
Dept: FAMILY MEDICINE | Facility: CLINIC | Age: 73
End: 2018-02-06

## 2018-02-06 RX ORDER — TRAMADOL HYDROCHLORIDE 50 MG/1
100 TABLET ORAL
Qty: 180 TABLET | Refills: 2 | Status: CANCELLED | OUTPATIENT
Start: 2018-02-06 | End: 2018-02-16

## 2018-02-06 NOTE — TELEPHONE ENCOUNTER
----- Message from Galina Hubbard sent at 2/5/2018 12:32 PM CST -----  Contact: Meghan  Has questions regarding traMADol (ULTRAM) 50 mg tablet. [facility] can be reached @ [phone].

## 2018-02-06 NOTE — TELEPHONE ENCOUNTER
Spoke with pharmacy and they want to verify that you want the pt to take tramadol 2 tabs every 4-6 hours. They state that the last script she had said to take 2 tabs every 6 hours. Please advise.

## 2018-02-07 ENCOUNTER — PATIENT MESSAGE (OUTPATIENT)
Dept: RHEUMATOLOGY | Facility: CLINIC | Age: 73
End: 2018-02-07

## 2018-02-09 ENCOUNTER — INFUSION (OUTPATIENT)
Dept: INFUSION THERAPY | Facility: HOSPITAL | Age: 73
End: 2018-02-09
Attending: INTERNAL MEDICINE
Payer: MEDICARE

## 2018-02-09 VITALS — HEART RATE: 92 BPM | RESPIRATION RATE: 16 BRPM | SYSTOLIC BLOOD PRESSURE: 128 MMHG | DIASTOLIC BLOOD PRESSURE: 75 MMHG

## 2018-02-09 DIAGNOSIS — Z53.1 REFUSAL OF BLOOD TRANSFUSIONS AS PATIENT IS JEHOVAH'S WITNESS: ICD-10-CM

## 2018-02-09 DIAGNOSIS — N18.9 ANEMIA IN CKD (CHRONIC KIDNEY DISEASE): Primary | ICD-10-CM

## 2018-02-09 DIAGNOSIS — D63.1 ANEMIA IN CKD (CHRONIC KIDNEY DISEASE): Primary | ICD-10-CM

## 2018-02-09 PROCEDURE — 63600175 PHARM REV CODE 636 W HCPCS: Mod: JG | Performed by: INTERNAL MEDICINE

## 2018-02-09 PROCEDURE — 96372 THER/PROPH/DIAG INJ SC/IM: CPT

## 2018-02-09 RX ADMIN — ERYTHROPOIETIN 10000 UNITS: 10000 INJECTION, SOLUTION INTRAVENOUS; SUBCUTANEOUS at 09:02

## 2018-02-09 NOTE — PLAN OF CARE
Problem: Patient Care Overview (Adult)  Goal: Plan of Care Review  Outcome: Ongoing (interventions implemented as appropriate)  Pt tolerating Procrit. No complaints voiced.

## 2018-02-14 ENCOUNTER — PATIENT MESSAGE (OUTPATIENT)
Dept: FAMILY MEDICINE | Facility: CLINIC | Age: 73
End: 2018-02-14

## 2018-02-22 DIAGNOSIS — D64.9 ANEMIA: Primary | ICD-10-CM

## 2018-02-23 ENCOUNTER — INFUSION (OUTPATIENT)
Dept: INFUSION THERAPY | Facility: HOSPITAL | Age: 73
End: 2018-02-23
Attending: INTERNAL MEDICINE
Payer: MEDICARE

## 2018-02-23 VITALS — DIASTOLIC BLOOD PRESSURE: 88 MMHG | RESPIRATION RATE: 16 BRPM | HEART RATE: 88 BPM | SYSTOLIC BLOOD PRESSURE: 155 MMHG

## 2018-02-23 DIAGNOSIS — D63.1 ANEMIA IN CKD (CHRONIC KIDNEY DISEASE): Primary | ICD-10-CM

## 2018-02-23 DIAGNOSIS — N18.9 ANEMIA IN CKD (CHRONIC KIDNEY DISEASE): Primary | ICD-10-CM

## 2018-02-23 DIAGNOSIS — Z53.1 REFUSAL OF BLOOD TRANSFUSIONS AS PATIENT IS JEHOVAH'S WITNESS: ICD-10-CM

## 2018-02-23 PROCEDURE — 96372 THER/PROPH/DIAG INJ SC/IM: CPT

## 2018-02-23 PROCEDURE — 63600175 PHARM REV CODE 636 W HCPCS: Mod: JG | Performed by: INTERNAL MEDICINE

## 2018-02-23 RX ADMIN — ERYTHROPOIETIN 10000 UNITS: 10000 INJECTION, SOLUTION INTRAVENOUS; SUBCUTANEOUS at 10:02

## 2018-02-23 NOTE — PLAN OF CARE
Problem: Patient Care Overview (Adult)  Goal: Plan of Care Review  Outcome: Ongoing (interventions implemented as appropriate)  Tolerated procrit. No complaints voiced.

## 2018-02-26 DIAGNOSIS — D64.9 ANEMIA: Primary | ICD-10-CM

## 2018-03-05 ENCOUNTER — PES CALL (OUTPATIENT)
Dept: ADMINISTRATIVE | Facility: CLINIC | Age: 73
End: 2018-03-05

## 2018-03-08 ENCOUNTER — OFFICE VISIT (OUTPATIENT)
Dept: RHEUMATOLOGY | Facility: CLINIC | Age: 73
End: 2018-03-08
Payer: MEDICARE

## 2018-03-08 ENCOUNTER — HOSPITAL ENCOUNTER (OUTPATIENT)
Dept: RADIOLOGY | Facility: HOSPITAL | Age: 73
Discharge: HOME | End: 2018-03-08
Attending: INTERNAL MEDICINE
Payer: MEDICARE

## 2018-03-08 VITALS
DIASTOLIC BLOOD PRESSURE: 89 MMHG | SYSTOLIC BLOOD PRESSURE: 135 MMHG | WEIGHT: 126.38 LBS | HEART RATE: 94 BPM | BODY MASS INDEX: 24.81 KG/M2 | HEIGHT: 60 IN | TEMPERATURE: 98 F

## 2018-03-08 DIAGNOSIS — G72.9 MYOPATHY: ICD-10-CM

## 2018-03-08 DIAGNOSIS — Z79.52 CURRENT USE OF STEROID MEDICATION: ICD-10-CM

## 2018-03-08 DIAGNOSIS — D84.9 IMMUNOSUPPRESSION: Chronic | ICD-10-CM

## 2018-03-08 DIAGNOSIS — R06.02 SHORTNESS OF BREATH: ICD-10-CM

## 2018-03-08 DIAGNOSIS — D86.9 SARCOIDOSIS: Chronic | ICD-10-CM

## 2018-03-08 DIAGNOSIS — D86.9 SARCOIDOSIS: Primary | Chronic | ICD-10-CM

## 2018-03-08 PROCEDURE — 3075F SYST BP GE 130 - 139MM HG: CPT | Mod: S$GLB,,, | Performed by: INTERNAL MEDICINE

## 2018-03-08 PROCEDURE — 71046 X-RAY EXAM CHEST 2 VIEWS: CPT | Mod: TC,FY

## 2018-03-08 PROCEDURE — 99214 OFFICE O/P EST MOD 30 MIN: CPT | Mod: 25,S$GLB,, | Performed by: INTERNAL MEDICINE

## 2018-03-08 PROCEDURE — 99499 UNLISTED E&M SERVICE: CPT | Mod: S$GLB,,, | Performed by: INTERNAL MEDICINE

## 2018-03-08 PROCEDURE — 71046 X-RAY EXAM CHEST 2 VIEWS: CPT | Mod: 26,,, | Performed by: RADIOLOGY

## 2018-03-08 PROCEDURE — 3079F DIAST BP 80-89 MM HG: CPT | Mod: S$GLB,,, | Performed by: INTERNAL MEDICINE

## 2018-03-08 PROCEDURE — 96372 THER/PROPH/DIAG INJ SC/IM: CPT | Mod: S$GLB,,, | Performed by: INTERNAL MEDICINE

## 2018-03-08 PROCEDURE — 99999 PR PBB SHADOW E&M-EST. PATIENT-LVL III: CPT | Mod: PBBFAC,,, | Performed by: INTERNAL MEDICINE

## 2018-03-08 RX ORDER — TRIAMCINOLONE ACETONIDE 40 MG/ML
80 INJECTION, SUSPENSION INTRA-ARTICULAR; INTRAMUSCULAR
Status: COMPLETED | OUTPATIENT
Start: 2018-03-08 | End: 2018-03-08

## 2018-03-08 RX ADMIN — TRIAMCINOLONE ACETONIDE 80 MG: 40 INJECTION, SUSPENSION INTRA-ARTICULAR; INTRAMUSCULAR at 11:03

## 2018-03-08 ASSESSMENT — ROUTINE ASSESSMENT OF PATIENT INDEX DATA (RAPID3)
PATIENT GLOBAL ASSESSMENT SCORE: 10
AM STIFFNESS SCORE: 0, NO
FATIGUE SCORE: 10
PAIN SCORE: 10
TOTAL RAPID3 SCORE: 7.44
MDHAQ FUNCTION SCORE: .7
PSYCHOLOGICAL DISTRESS SCORE: 3.3

## 2018-03-08 NOTE — PROGRESS NOTES
Subjective:       Patient ID: Regino Lawrence is a 72 y.o. female.    Chief Complaint: Disease Management      HPI:  Regino Lawrence is a 72 y.o. female with history of sarcoidosis with associated myopathy and   arthropathy. Sarcoidosis that was first manifested by muscle inflammation, low white   blood cell count, hair loss, skin involvement. She was treated in the   past with methotrexate and Plaquenil, both of which were ineffective.   Cellcept and Imuran caused some unknown side effect (she thinks it made her sick).   Colchicine was held due to low WBC.   Although methotrexate did not help in past it was retried and she felt it helped hair growth but did not help body aches.   She held MTX due to an URI but patient has not wanted restarted since then (2013).   Leflunomide was held due to elevated BP after less than a week of use.       Reports pain all over 10/10 burning pain throughout her body.  Now with left leg that goes down to foot.  Back surgery was held in past due to need for neck surgery.  She restarted gabapentin but has not helped.  Activity worsens.  Nothing helps the pain except IM steroid injection.    She is currently on prednisone 10 mg without improvement.  Recently on antibiotic for UTI.    Review of Systems   Constitutional: Positive for fatigue.   HENT:        Dry mouth   Eyes:        Dry eyes   Respiratory: Positive for shortness of breath.    Endocrine: Negative.    Genitourinary: Negative.    Allergic/Immunologic: Negative.    Neurological: Positive for numbness.         Objective:   /89 (BP Location: Right arm, Patient Position: Sitting, BP Method: Small (Automatic))   Pulse 94   Temp 98.3 °F (36.8 °C) (Oral)   Ht 5' (1.524 m)   Wt 57.3 kg (126 lb 6.4 oz)   LMP  (LMP Unknown)   BMI 24.69 kg/m²      Physical Exam   Constitutional: She is oriented to person, place, and time and well-developed, well-nourished, and in no distress.   HENT:   Head: Normocephalic and atraumatic.    Eyes: Conjunctivae and EOM are normal.   Neck: Neck supple.   Cardiovascular: Normal rate, regular rhythm and normal heart sounds.    Pulmonary/Chest: Effort normal and breath sounds normal.   Abdominal: Soft. Bowel sounds are normal.   Neurological: She is oriented to person, place, and time.   Slow careful gait; uses rolling walker   Skin: Skin is warm and dry.     Psychiatric: Mood and affect normal.   Musculoskeletal:   Pain right 2nd and 3rd MCP  4.5/5 UE and LE proximal strength (limited by pain)            LABS    Component      Latest Ref Rng & Units 2/23/2018 2/9/2018 1/26/2018   WBC      3.90 - 12.70 K/uL 5.47 5.86 5.77   RBC      4.00 - 5.40 M/uL 3.30 (L) 3.24 (L) 3.36 (L)   Hemoglobin      12.0 - 16.0 g/dL 10.6 (L) 10.3 (L) 10.8 (L)   Hematocrit      37.0 - 48.5 % 33.3 (L) 31.9 (L) 32.9 (L)   MCV      82 - 98 fL 101 (H) 99 (H) 98   MCH      27.0 - 31.0 pg 32.1 (H) 31.8 (H) 32.1 (H)   MCHC      32.0 - 36.0 g/dL 31.8 (L) 32.3 32.8   RDW      11.5 - 14.5 % 13.3 13.3 13.5   Platelets      150 - 350 K/uL 304 297 276   MPV      9.2 - 12.9 fL 9.3 8.6 (L) 8.5 (L)   Gran # (ANC)      1.8 - 7.7 K/uL 2.6 3.2 4.3   Lymph #      1.0 - 4.8 K/uL  1.9 0.9 (L)   Mono #      0.3 - 1.0 K/uL  0.6 0.5   Eos #      0.0 - 0.5 K/uL  0.0 0.0   Baso #      0.00 - 0.20 K/uL  0.01 0.01   Gran%      38.0 - 73.0 %  55.0 74.3 (H)   Lymph%      18.0 - 48.0 %  32.9 15.6 (L)   Mono%      4.0 - 15.0 %  10.2 8.3   Eosinophil%      0.0 - 8.0 %  0.7 0.0   Basophil%      0.0 - 1.9 %  0.2 0.2   Differential Method        Automated Automated     Component      Latest Ref Rng & Units 1/22/2018 12/7/2017   Specimen UA       Urine, Clean Catch    Color, UA      Yellow, Straw, Leeanne Yellow    Appearance, UA      Clear Hazy (A)    pH, UA      5.0 - 8.0 7.0    Specific Gravity, UA      1.005 - 1.030 1.015    Protein, UA      Negative Negative    Glucose, UA      Negative Negative    Ketones, UA      Negative Negative    Bilirubin (UA)      Negative  Negative    Occult Blood UA      Negative Negative    Nitrite, UA      Negative Negative    Urobilinogen, UA      <2.0 EU/dL Negative    Leukocytes, UA      Negative Trace (A)    RBC, UA      0 - 4 /hpf 2    WBC, UA      0 - 5 /hpf 11 (H)    Bacteria, UA      None-Occ /hpf Many (A)    Squam Epithel, UA      /hpf 11    Hyaline Casts, UA      0-1/lpf /lpf 3 (A)    Microscopic Comment       SEE COMMENT    Hemoglobin A1C      4.0 - 5.6 % 6.0 (H)    Estimated Avg Glucose      68 - 131 mg/dL 126    Aldolase      1.2 - 7.6 U/L  2.2   CPK      20 - 180 U/L  54   CRP      0.0 - 8.2 mg/L  5.1   Sed Rate      0 - 20 mm/Hr  8     Assessment:       1.   Sarcoidosis. Manifested by myopathy and arthropathy. Persistent joint pain and myalgias despite prednisone 15 mg.  Now with diffuse body pain    2.   Myalgia and myositis.    3.   Osteopenia. Took Fosamax for 5 years stopped 6/2013.    4.   Fatigue     5.   Diabetes mellitus type 2 in nonobese     6.           Neck pain. X-ray with degenerative changes. S/p laminectomy-cervical fusion C3-C7 11/16/2015 for cervical spinal stenosis.    7.           Back pain    8.           HTN.  9.           SOB    Plan:       1. Labs and CXR  2. Continue prednisone 10 mg daily.  Hold leflunomide 10 mg daily due to elevated BP.  Message   Discussed risk of AVN and other issues with prednisone.  Encouraged patient to try methotrexate steroid sparing agent.  Handout given.  Risk infection and liver abnormalities discussed.  Will give folic acid with MTX if patient agrees to MTX after CXR  3. Continue tramadol for pain. Can take 3 times daily.   4. Following with nephrology  5. DEXA with osteopenia of hip total and femoral neck. FRAX does not suggest treatment however with prednisone>7.5 mg will consider Prolia (due renal insufficiency).  Information provided for patient to review.  She read information but now needs to see dentist to have teeth pulled.                 RTO in 3-4 months.    Patient  seen face to face for 25 minutes and greater than 50% spent in counseling regarding Joint pains,   management of sarcoidosis and pain.

## 2018-03-09 ENCOUNTER — PATIENT MESSAGE (OUTPATIENT)
Dept: RHEUMATOLOGY | Facility: CLINIC | Age: 73
End: 2018-03-09

## 2018-03-09 ENCOUNTER — INFUSION (OUTPATIENT)
Dept: INFUSION THERAPY | Facility: HOSPITAL | Age: 73
End: 2018-03-09
Attending: INTERNAL MEDICINE
Payer: MEDICARE

## 2018-03-09 VITALS — DIASTOLIC BLOOD PRESSURE: 58 MMHG | SYSTOLIC BLOOD PRESSURE: 107 MMHG | HEART RATE: 97 BPM

## 2018-03-09 DIAGNOSIS — D63.1 ANEMIA IN CKD (CHRONIC KIDNEY DISEASE): Primary | ICD-10-CM

## 2018-03-09 DIAGNOSIS — N18.9 ANEMIA IN CKD (CHRONIC KIDNEY DISEASE): Primary | ICD-10-CM

## 2018-03-09 DIAGNOSIS — Z53.1 REFUSAL OF BLOOD TRANSFUSIONS AS PATIENT IS JEHOVAH'S WITNESS: ICD-10-CM

## 2018-03-09 PROCEDURE — 63600175 PHARM REV CODE 636 W HCPCS: Mod: JG | Performed by: INTERNAL MEDICINE

## 2018-03-09 PROCEDURE — 96372 THER/PROPH/DIAG INJ SC/IM: CPT

## 2018-03-09 RX ADMIN — ERYTHROPOIETIN 10000 UNITS: 10000 INJECTION, SOLUTION INTRAVENOUS; SUBCUTANEOUS at 09:03

## 2018-03-22 ENCOUNTER — OFFICE VISIT (OUTPATIENT)
Dept: FAMILY MEDICINE | Facility: CLINIC | Age: 73
End: 2018-03-22
Payer: MEDICARE

## 2018-03-22 VITALS
WEIGHT: 119.63 LBS | HEIGHT: 60 IN | OXYGEN SATURATION: 97 % | HEART RATE: 103 BPM | BODY MASS INDEX: 23.49 KG/M2 | DIASTOLIC BLOOD PRESSURE: 60 MMHG | SYSTOLIC BLOOD PRESSURE: 154 MMHG | TEMPERATURE: 98 F

## 2018-03-22 DIAGNOSIS — M10.9 ACUTE GOUT INVOLVING TOE OF LEFT FOOT, UNSPECIFIED CAUSE: Primary | ICD-10-CM

## 2018-03-22 DIAGNOSIS — N18.30 DIABETES MELLITUS WITH STAGE 3 CHRONIC KIDNEY DISEASE: Chronic | ICD-10-CM

## 2018-03-22 DIAGNOSIS — I10 ESSENTIAL HYPERTENSION: Chronic | ICD-10-CM

## 2018-03-22 DIAGNOSIS — E11.22 DIABETES MELLITUS WITH STAGE 3 CHRONIC KIDNEY DISEASE: Chronic | ICD-10-CM

## 2018-03-22 DIAGNOSIS — E11.8 CONTROLLED DIABETES MELLITUS TYPE 2 WITH COMPLICATIONS, UNSPECIFIED LONG TERM INSULIN USE STATUS: ICD-10-CM

## 2018-03-22 PROCEDURE — 3044F HG A1C LEVEL LT 7.0%: CPT | Mod: CPTII,S$GLB,, | Performed by: NURSE PRACTITIONER

## 2018-03-22 PROCEDURE — 3078F DIAST BP <80 MM HG: CPT | Mod: CPTII,S$GLB,, | Performed by: NURSE PRACTITIONER

## 2018-03-22 PROCEDURE — 99214 OFFICE O/P EST MOD 30 MIN: CPT | Mod: S$GLB,,, | Performed by: NURSE PRACTITIONER

## 2018-03-22 PROCEDURE — 3077F SYST BP >= 140 MM HG: CPT | Mod: CPTII,S$GLB,, | Performed by: NURSE PRACTITIONER

## 2018-03-22 PROCEDURE — 99499 UNLISTED E&M SERVICE: CPT | Mod: S$GLB,,, | Performed by: NURSE PRACTITIONER

## 2018-03-22 PROCEDURE — 99999 PR PBB SHADOW E&M-EST. PATIENT-LVL V: CPT | Mod: PBBFAC,,, | Performed by: NURSE PRACTITIONER

## 2018-03-22 RX ORDER — CALCIUM CARBONATE 600 MG
1 TABLET ORAL 2 TIMES DAILY
COMMUNITY
Start: 2018-01-22 | End: 2024-03-18

## 2018-03-22 RX ORDER — CLONIDINE HYDROCHLORIDE 0.3 MG/1
0.3 TABLET ORAL 3 TIMES DAILY
Qty: 270 TABLET | Refills: 1 | Status: SHIPPED | OUTPATIENT
Start: 2018-03-22 | End: 2018-04-19 | Stop reason: SDUPTHER

## 2018-03-22 RX ORDER — MINERAL OIL
180 ENEMA (ML) RECTAL DAILY PRN
COMMUNITY
Start: 2018-03-22 | End: 2019-03-22

## 2018-03-22 RX ORDER — METFORMIN HYDROCHLORIDE 1000 MG/1
1000 TABLET ORAL 2 TIMES DAILY WITH MEALS
Qty: 180 TABLET | Refills: 1 | Status: ON HOLD | OUTPATIENT
Start: 2018-03-22 | End: 2018-10-08 | Stop reason: HOSPADM

## 2018-03-22 RX ORDER — INDOMETHACIN 50 MG/1
50 CAPSULE ORAL 3 TIMES DAILY
Qty: 30 CAPSULE | Refills: 0 | Status: SHIPPED | OUTPATIENT
Start: 2018-03-22 | End: 2018-03-26 | Stop reason: SDUPTHER

## 2018-03-22 RX ORDER — LISINOPRIL 40 MG/1
40 TABLET ORAL DAILY
Qty: 90 TABLET | Refills: 1 | Status: SHIPPED | OUTPATIENT
Start: 2018-03-22 | End: 2018-04-19 | Stop reason: SDUPTHER

## 2018-03-22 NOTE — PROGRESS NOTES
Subjective:       Patient ID: Regino Lawrence is a 72 y.o. female.    Chief Complaint: Leg Pain (Left   1 WEEK) and Hypertension (F/U)    72-year-old female presents to the clinic today with complaint of pain to left great toe radiating up left foot since Tuesday of last week.  She states she restarted her gabapentin Tuesday last week and  is currently taking 400 mg twice a day.  But, she states is not helping with her left great toe pain.  She denies any calf pain.  She denies any swelling to left leg.  She states her blood sugar was 109 is morning.  She states her blood pressure yesterday morning was 167/100 and at lunch time it was 118/70.  She only takes her clonidine 0.3 twice a day for the most part instead of 3 times a day as prescribed.  She says her blood pressure is always good in the middle of the day.  She denies any cardiac chest pain, heart palpitations, shortness breath, or swelling to lower extremities.  She denies any headaches, dizziness, or blurred vision.  Her GFR 3 months ago and 6 months ago was greater than 60.      Past Medical History:   Diagnosis Date    Acid reflux     Allergy     Alopecia     Anemia     Anxiety     Arthritis     Back pain     Cataract     Chronic kidney disease     Depression     Diabetes mellitus, type 2     Eye injury as a child     k-abrasion  od    Hyperlipidemia     Hypertension     Hypothyroidism     Immune deficiency disorder     Immune disorder     Myalgia and myositis 2012    Osteoporosis     Polyneuropathy     Renal manifestation of secondary diabetes mellitus     Sarcoidosis     Ulcer     no cancer    Urinary incontinence      Past Surgical History:   Procedure Laterality Date    CARPAL TUNNEL RELEASE      Rt wrist    CATARACT EXTRACTION W/  INTRAOCULAR LENS IMPLANT Right 2015    Dr. Azevedo    CATARACT EXTRACTION W/  INTRAOCULAR LENS IMPLANT Left 2015    Dr. Azevedo     SECTION      CHOLECYSTECTOMY       TUBAL LIGATION        reports that she has never smoked. She has never used smokeless tobacco. She reports that she does not drink alcohol or use drugs.  Review of Systems   Respiratory: Negative for cough, shortness of breath and wheezing.    Cardiovascular: Negative for chest pain, palpitations and leg swelling.   Gastrointestinal: Negative for abdominal pain, blood in stool, constipation, diarrhea, nausea and vomiting.   Musculoskeletal: Positive for gait problem.        Left great toe pain uses rolling walker    Skin: Negative for rash.   Neurological: Negative for dizziness, light-headedness and headaches.       Objective:      Physical Exam   Constitutional: She is oriented to person, place, and time. She appears well-developed and well-nourished. No distress.   Eyes: Conjunctivae and EOM are normal. Pupils are equal, round, and reactive to light. Right eye exhibits no discharge. Left eye exhibits no discharge. No scleral icterus.   Neck: Normal range of motion. Neck supple. No JVD present.   Cardiovascular: Normal rate, regular rhythm and normal heart sounds.    No murmur heard.  Pulmonary/Chest: Effort normal and breath sounds normal. No respiratory distress. She has no wheezes. She has no rales.   Abdominal: Soft. Bowel sounds are normal. There is no tenderness.   Musculoskeletal: Normal range of motion. She exhibits tenderness. She exhibits no edema.   Tenderness base of left great toe no swelling or redness noted negative delphine's sign    Neurological: She is alert and oriented to person, place, and time.   Skin: Skin is warm and dry. She is not diaphoretic.   Psychiatric: She has a normal mood and affect.       Assessment:       1. Acute gout involving toe of left foot, unspecified cause    2. Controlled diabetes mellitus type 2 with complications, unspecified long term insulin use status    3. Diabetes mellitus with stage 3 chronic kidney disease    4. Essential hypertension        Plan:         Acute  gout involving toe of left foot, unspecified cause  -     indomethacin (INDOCIN) 50 MG capsule; Take 1 capsule (50 mg total) by mouth 3 (three) times daily.  Dispense: 30 capsule; Refill: 0    Controlled diabetes mellitus type 2 with complications, unspecified long term insulin use status  -     metFORMIN (GLUCOPHAGE) 1000 MG tablet; Take 1 tablet (1,000 mg total) by mouth 2 (two) times daily with meals.  Dispense: 180 tablet; Refill: 1    Diabetes mellitus with stage 3 chronic kidney disease  -     metFORMIN (GLUCOPHAGE) 1000 MG tablet; Take 1 tablet (1,000 mg total) by mouth 2 (two) times daily with meals.  Dispense: 180 tablet; Refill: 1    Essential hypertension  -     cloNIDine (CATAPRES) 0.3 MG tablet; Take 1 tablet (0.3 mg total) by mouth 3 (three) times daily.  Dispense: 270 tablet; Refill: 1  -     lisinopril (PRINIVIL,ZESTRIL) 40 MG tablet; Take 1 tablet (40 mg total) by mouth once daily.  Dispense: 90 tablet; Refill: 1

## 2018-03-22 NOTE — PATIENT INSTRUCTIONS
Indocin 50 mg every 8 hours with food as needed for left great toe pain  Continue all current medications  Follow up with Dr. Mendoza in 2 weeks

## 2018-03-23 ENCOUNTER — OFFICE VISIT (OUTPATIENT)
Dept: HEMATOLOGY/ONCOLOGY | Facility: CLINIC | Age: 73
End: 2018-03-23
Payer: MEDICARE

## 2018-03-23 VITALS
DIASTOLIC BLOOD PRESSURE: 82 MMHG | SYSTOLIC BLOOD PRESSURE: 148 MMHG | HEART RATE: 94 BPM | HEIGHT: 60 IN | BODY MASS INDEX: 24.23 KG/M2 | WEIGHT: 123.44 LBS | OXYGEN SATURATION: 98 % | TEMPERATURE: 98 F

## 2018-03-23 DIAGNOSIS — Z53.1 REFUSAL OF BLOOD TRANSFUSIONS AS PATIENT IS JEHOVAH'S WITNESS: ICD-10-CM

## 2018-03-23 DIAGNOSIS — N18.9 ANEMIA IN CHRONIC KIDNEY DISEASE, UNSPECIFIED CKD STAGE: Primary | ICD-10-CM

## 2018-03-23 DIAGNOSIS — D63.1 ANEMIA IN CHRONIC KIDNEY DISEASE, UNSPECIFIED CKD STAGE: Primary | ICD-10-CM

## 2018-03-23 PROCEDURE — 3079F DIAST BP 80-89 MM HG: CPT | Mod: CPTII,S$GLB,, | Performed by: INTERNAL MEDICINE

## 2018-03-23 PROCEDURE — 99499 UNLISTED E&M SERVICE: CPT | Mod: S$GLB,,, | Performed by: INTERNAL MEDICINE

## 2018-03-23 PROCEDURE — 3077F SYST BP >= 140 MM HG: CPT | Mod: CPTII,S$GLB,, | Performed by: INTERNAL MEDICINE

## 2018-03-23 PROCEDURE — 99999 PR PBB SHADOW E&M-EST. PATIENT-LVL V: CPT | Mod: PBBFAC,,, | Performed by: INTERNAL MEDICINE

## 2018-03-23 PROCEDURE — 99213 OFFICE O/P EST LOW 20 MIN: CPT | Mod: S$GLB,,, | Performed by: INTERNAL MEDICINE

## 2018-03-23 NOTE — Clinical Note
Hold procrit this week CBC q 2wks-STANDING sched cbc and procrit same day  PROCRIT q 2wks ( pending lab paraemters)

## 2018-03-23 NOTE — PROGRESS NOTES
Subjective:       Patient ID: Regino Lawrence is a 72 y.o. female.    Chief Complaint: No chief complaint on file.  Diagnosis: Anemia in CKD  Patient is a Restorationism  .  HPI    The patient is seen today for chronic anemia in CKD. The patient reports that she has been diagnosed with JOLLY in the past.  She has been on oral iron supplementation therapy, but could not tolerate or did not respond, she is uncertain.  She is followed by GI and has undergone a colonoscopy earlier this year and was diagnosed with hemorrhoids for which she underwent banding procedure.  No melena, hematochezia,change in bowel habits.  She has also been diagnosed with B12 deficiency in the past, but reports she did not respond to B12 injections. No history of blood transfusions.  She is a Restorationism.  She reports that she remembers getting injections in the 1970s when her blood count was low.        She is followed by Rheumatology for history of sarcoidosis with associated myopathy and arthropathy.   .She has been treated in the  past with methotrexate and Plaquenil, both of which were ineffective  Cellcept and imuran caused some unknown side effect.   Colchicine  held due to low WBC   She continues on chronic steroid therapy, Prednisone 10mg qd    Today, she has no new issues  She has chronic diffuse arthralgias  Chronic LBP- stable.   She ambulates with cane  Mild  fatigue  No SOB/CP/NV     She continues to undergo Procrit therapy q 2wks  She undergoes intermittent IV iron therapy        CBC 3/23/2018  reveals wbc 6900/mm3  Hb 11.1 g/dl Hct 34.5  % Plt ct 292k    PAST MEDICAL HISTORY:  Acid reflux, alopecia, anemia, anxiety disorder, chronic  kidney disease, depression, diabetes mellitus type 2, hyperlipidemia,  hypertension, hypothyroidism, osteoporosis, sarcoidosis.    PAST SURGICAL HISTORY:  Cholecystectomy, , tubal ligation, carpal tunnel release, cataracts.    FAMILY HISTORY: Unremarkable for cancer. Significant for  HTN.       Review of Systems   Constitutional: Negative for activity change, appetite change and fatigue.   HENT: Negative for hearing loss and nosebleeds.    Eyes: Negative for visual disturbance.   Respiratory: Negative for cough and shortness of breath.    Cardiovascular: Negative for chest pain and leg swelling.   Gastrointestinal: Negative for abdominal pain, constipation, diarrhea and nausea.   Genitourinary: Negative for flank pain and urgency.   Musculoskeletal: Positive for arthralgias, back pain, gait problem and joint swelling.   Skin: Negative for rash.        No petechiae, ecchymoses   Neurological: Negative for light-headedness and headaches.   Hematological: Negative for adenopathy. Does not bruise/bleed easily.       Objective:       Vitals:    03/23/18 1329   BP: (!) 148/82   BP Location: Left arm   Patient Position: Sitting   BP Method: Medium (Manual)   Pulse: 94   Temp: 98.2 °F (36.8 °C)   TempSrc: Oral   SpO2: 98%   Weight: 56 kg (123 lb 7.3 oz)   Height: 5' (1.524 m)       Physical Exam   Constitutional: She is oriented to person, place, and time. She appears well-developed and well-nourished.   HENT:   Head: Normocephalic.   Mouth/Throat: Oropharynx is clear and moist. No oropharyngeal exudate.   Eyes: Conjunctivae and lids are normal. Pupils are equal, round, and reactive to light. No scleral icterus.   Neck: Normal range of motion. Neck supple. No thyromegaly present.   Cardiovascular: Normal rate, regular rhythm and normal heart sounds.    No murmur heard.  Pulmonary/Chest: Breath sounds normal. She has no wheezes. She has no rales.   Abdominal: Soft. Bowel sounds are normal. She exhibits no distension and no mass. There is no hepatosplenomegaly. There is no tenderness. There is no rebound and no guarding.   Musculoskeletal: Normal range of motion. She exhibits no edema or tenderness.   Lymphadenopathy:     She has no cervical adenopathy.     She has no axillary adenopathy.        Right: No  supraclavicular adenopathy present.        Left: No supraclavicular adenopathy present.   Neurological: She is alert and oriented to person, place, and time. No cranial nerve deficit. Coordination normal.   Skin: Skin is warm and dry. No ecchymosis, no petechiae and no rash noted. No erythema.   Psychiatric: She has a normal mood and affect.             Lab Results   Component Value Date    WBC 6.93 03/23/2018    HGB 11.1 (L) 03/23/2018    HCT 34.5 (L) 03/23/2018    MCV 99 (H) 03/23/2018     03/23/2018     Lab Results   Component Value Date    IRON 63 03/23/2018    TIBC 400 03/23/2018    FERRITIN 105 03/23/2018     SPEP-nl          CT renal 3/6/2017   IMPRESSION:  1.  No renal, ureteral or bladder calculi.  No hydronephrosis or ureterectasis.  2.  Poorly distended bladder.  Mild bladder wall prominence.  Mild cystitis cannot be   excluded.  3.  Moderate constipation.  Normal appendix      Lab Results   Component Value Date    IRON 63 03/23/2018    TIBC 400 03/23/2018    FERRITIN 105 03/23/2018       Assessment:       1. Anemia in chronic kidney disease, unspecified CKD stage    2. Refusal of blood transfusions as patient is Protestant        Plan:   1-2 Pt clinically stable  Pt is a Scientology and declines/not interested in blood and blood products due to Yazidi beliefs  Hb 11.1 g/dl -stable  Ferritin 105  Hold procrit this week ( Hb 11.1g/dl)     CBC q2wks  Cont  Procrit therapy  q 2wks ( pending lab parameters)-STANDING    Follow-up with PCP for med mgmt    F/u 2 mos with Fe studies        CC: Micaela Mendzoa M.D.

## 2018-03-26 DIAGNOSIS — M10.9 ACUTE GOUT INVOLVING TOE OF LEFT FOOT, UNSPECIFIED CAUSE: ICD-10-CM

## 2018-03-27 RX ORDER — INDOMETHACIN 50 MG/1
50 CAPSULE ORAL 3 TIMES DAILY
Qty: 30 CAPSULE | Refills: 0 | Status: SHIPPED | OUTPATIENT
Start: 2018-03-27 | End: 2018-05-28

## 2018-03-27 NOTE — TELEPHONE ENCOUNTER
----- Message from Kristi Silver sent at 3/26/2018  8:31 AM CDT -----  Contact: [facility]- [place]   Patient need a PA for her medication.      indomethacin (INDOCIN) 50 MG capsule          [id number] - [place], [place] - [address] [facility] [address]

## 2018-04-06 ENCOUNTER — INFUSION (OUTPATIENT)
Dept: INFUSION THERAPY | Facility: HOSPITAL | Age: 73
End: 2018-04-06
Attending: INTERNAL MEDICINE
Payer: MEDICARE

## 2018-04-06 ENCOUNTER — OFFICE VISIT (OUTPATIENT)
Dept: URGENT CARE | Facility: CLINIC | Age: 73
End: 2018-04-06
Payer: MEDICARE

## 2018-04-06 VITALS
WEIGHT: 125 LBS | DIASTOLIC BLOOD PRESSURE: 91 MMHG | HEIGHT: 60 IN | HEART RATE: 92 BPM | RESPIRATION RATE: 12 BRPM | HEART RATE: 87 BPM | BODY MASS INDEX: 24.54 KG/M2 | OXYGEN SATURATION: 98 % | RESPIRATION RATE: 19 BRPM | DIASTOLIC BLOOD PRESSURE: 98 MMHG | SYSTOLIC BLOOD PRESSURE: 182 MMHG | TEMPERATURE: 98 F | OXYGEN SATURATION: 98 % | SYSTOLIC BLOOD PRESSURE: 170 MMHG | TEMPERATURE: 99 F

## 2018-04-06 DIAGNOSIS — Z53.1 REFUSAL OF BLOOD TRANSFUSIONS AS PATIENT IS JEHOVAH'S WITNESS: ICD-10-CM

## 2018-04-06 DIAGNOSIS — N18.9 ANEMIA IN CKD (CHRONIC KIDNEY DISEASE): Primary | ICD-10-CM

## 2018-04-06 DIAGNOSIS — D63.1 ANEMIA IN CKD (CHRONIC KIDNEY DISEASE): Primary | ICD-10-CM

## 2018-04-06 DIAGNOSIS — M79.675 GREAT TOE PAIN, LEFT: Primary | ICD-10-CM

## 2018-04-06 PROCEDURE — 3077F SYST BP >= 140 MM HG: CPT | Mod: CPTII,S$GLB,, | Performed by: NURSE PRACTITIONER

## 2018-04-06 PROCEDURE — 63600175 PHARM REV CODE 636 W HCPCS: Mod: JG | Performed by: INTERNAL MEDICINE

## 2018-04-06 PROCEDURE — 96372 THER/PROPH/DIAG INJ SC/IM: CPT

## 2018-04-06 PROCEDURE — 3080F DIAST BP >= 90 MM HG: CPT | Mod: CPTII,S$GLB,, | Performed by: NURSE PRACTITIONER

## 2018-04-06 PROCEDURE — 99214 OFFICE O/P EST MOD 30 MIN: CPT | Mod: S$GLB,,, | Performed by: NURSE PRACTITIONER

## 2018-04-06 RX ADMIN — ERYTHROPOIETIN 10000 UNITS: 10000 INJECTION, SOLUTION INTRAVENOUS; SUBCUTANEOUS at 11:04

## 2018-04-06 NOTE — PROGRESS NOTES
Subjective:       Patient ID: Regino Lawrence is a 72 y.o. female.    Vitals:  height is 5' (1.524 m) and weight is 56.7 kg (125 lb). Her oral temperature is 98.8 °F (37.1 °C). Her blood pressure is 182/98 (abnormal) and her pulse is 92. Her respiration is 12 and oxygen saturation is 98%.     Chief Complaint: Leg Pain    Patient states that the pain starts from her left big toe and shoots up to her left knee.  Her left big toe is swollen & red. She was treated two weeks ago for gout with Indocin but no uric acid level drawn.        Leg Pain    The incident occurred more than 1 week ago. The incident occurred at home. There was no injury mechanism. The pain is present in the left toes, left leg and left foot. The quality of the pain is described as stabbing and shooting. The pain is at a severity of 10/10. The pain is severe. The pain has been constant since onset. Associated symptoms include numbness and tingling. Pertinent negatives include no inability to bear weight, loss of motion, loss of sensation or muscle weakness. She reports no foreign bodies present. The symptoms are aggravated by movement and weight bearing. Treatments tried: Gabapentin. The treatment provided no relief.     Review of Systems   Constitution: Negative for chills and fever.   HENT: Negative for sore throat.    Eyes: Negative for blurred vision.   Cardiovascular: Negative for chest pain.   Respiratory: Negative for shortness of breath.    Skin: Negative for rash.   Musculoskeletal: Positive for joint pain and joint swelling. Negative for back pain.   Gastrointestinal: Negative for abdominal pain, diarrhea, nausea and vomiting.   Neurological: Positive for numbness and tingling. Negative for headaches.   Psychiatric/Behavioral: The patient is not nervous/anxious.    All other systems reviewed and are negative.      Objective:      Physical Exam   Constitutional: She is oriented to person, place, and time. She appears well-developed and  well-nourished. She is cooperative.  Non-toxic appearance. She does not appear ill. No distress.   HENT:   Head: Normocephalic and atraumatic.   Right Ear: Hearing, tympanic membrane, external ear and ear canal normal.   Left Ear: Hearing, tympanic membrane, external ear and ear canal normal.   Nose: Nose normal. No mucosal edema, rhinorrhea or nasal deformity. No epistaxis. Right sinus exhibits no maxillary sinus tenderness and no frontal sinus tenderness. Left sinus exhibits no maxillary sinus tenderness and no frontal sinus tenderness.   Mouth/Throat: Uvula is midline, oropharynx is clear and moist and mucous membranes are normal. No trismus in the jaw. Normal dentition. No uvula swelling. No posterior oropharyngeal erythema.   Eyes: Conjunctivae and lids are normal. Right eye exhibits no discharge. Left eye exhibits no discharge. No scleral icterus.   Sclera clear bilat   Neck: Trachea normal, normal range of motion, full passive range of motion without pain and phonation normal. Neck supple.   Cardiovascular: Normal rate, regular rhythm, normal heart sounds, intact distal pulses and normal pulses.    Pulmonary/Chest: Effort normal and breath sounds normal. No respiratory distress.   Abdominal: Soft. Normal appearance and bowel sounds are normal. She exhibits no distension, no pulsatile midline mass and no mass. There is no tenderness.   Musculoskeletal: Normal range of motion. She exhibits no edema or deformity.        Left foot: There is tenderness and swelling. There is normal range of motion, no bony tenderness, normal capillary refill, no crepitus, no deformity and no laceration.   Neurological: She is alert and oriented to person, place, and time. She exhibits normal muscle tone. Coordination normal.   Skin: Skin is warm, dry and intact. She is not diaphoretic. No pallor.   Psychiatric: She has a normal mood and affect. Her speech is normal and behavior is normal. Judgment and thought content normal.  Cognition and memory are normal.   Nursing note and vitals reviewed.      Assessment:       1. Great toe pain, left        Plan:         Great toe pain, left  -     Uric acid        Treating Gout Attacks     Raising the joint above the level of your heart can help reduce gout symptoms.     Gout is a disease that affects the joints. It is caused by excess uric acid in your blood stream that may lead to crystals forming in your joints. Left untreated, it can lead to painful foot and joint deformities and even kidney problems. But, by treating gout early, you can relieve pain and help prevent future problems. Gout can usually be treated with medication and proper diet. In severe cases, surgery may be needed.  Gout attacks are painful and often happen more than once. Taking medications may reduce pain and prevent attacks in the future. There are also some things you can do at home to relieve symptoms.  Medications for gout  Your healthcare provider may prescribe a daily medication to reduce levels of uric acid. Reducing your uric acid levels may help prevent gout attacks. Allopurinol is one commonly used medication taken daily to reduce uric acid levels. Other medications can help relieve pain and swelling during an acute attack. Medicines such as NSAIDs (nonsteroidal anti-inflammatory medicines), steroids, and colchicine may be prescribed for intermittent use to relieve an acute gout attack. Be sure to take your medication as directed.  What you can do  Below are some things you can do at home to relieve gout symptoms. Your healthcare provider may have other tips.  · Rest the painful joint as much as you can.  · Raise the painful joint so it is at a level higher than your heart.  · Use ice for 10 minutes every 1-2 hours as possible.  How can I prevent gout?  With a little effort, you may be able to prevent gout attacks in the future. Here are some things you can do:  · Avoid foods high in purines  ¨ Certain meats (red  meat, processed meat, turkey)  ¨ Organ meats (kidney, liver, sweetbread)  ¨ Shellfish (lobster, crab, shrimp, scallop, mussel)  ¨ Certain fish (anchovy, sardine, herring, mackerel)  · Take any medications prescribed by your healthcare provider.  · Lose weight if you need to.  · Reduce high fructose corn syrup in meals and drinks.  · Reduce or eliminate consumption of alcohol, particularly beer, but also red wine and spirits.  · Control blood pressure, diabetes, and cholesterol.  · Drink plenty of water to help flush uric acid from your body.  Date Last Reviewed: 2/1/2016  © 2389-7045 [employer]. [address] [place] [address], [place] [address]. All rights reserved. This information is not intended as a substitute for professional medical care. Always follow your healthcare professional's instructions.        Uric Acid (Blood)  Does this test have other names?  Serum uric acid  What is this test?  This test measures the amount of uric acid in your blood.  Uric acid is a normal bodily waste product. It forms when chemicals called purines break down. Purines are a natural substance found in the body and are also found in many foods such as liver, shellfish, and alcohol. They can also be formed in the body when DNA is broken down.   When purines are broken down to uric acid in the blood, the body gets rid of it when you urinate or have a bowel movement. But if your body makes too much uric acid, or if your kidneys aren't working properly, uric acid can build up in the blood. Uric acid levels can also increase when you eat too many high-purine foods or take certain medicines like diuretics, aspirin, and niacin. Then crystals of uric acid can form and collect in the joints, causing painful inflammation. This condition is called gout.  Why do I need this test?  You might need this test if your healthcare provider wants to see whether you have high levels of uric acid in your blood. Your provider may recommend this  test if you have symptoms of gout, although most people with hyperuricemia don't develop gout. Symptoms of gout include:  · Joint pain or tenderness  · Swelling in a joint or reddened skin around a joint  · Swelling and pain in the big toe, ankle, or knee  · Joints that are hot to the touch  · Swelling and pain that affects only one joint in the body  · Skin that looks shiny and is red or purple  You may also need this test if you have symptoms of kidney stones. Symptoms include:  · Severe pain along your lower back. This may repeatedly get worse and then ease up. The pain may also travel to your genitals.  · Nausea  · Vomiting  · Urgent need to urinate  · Blood in your urine  What other tests might I have along with this test?  Your healthcare provider may also order other tests to diagnose gout, include looking at a sample of joint fluid drawn out with a needle.  Your provider may also order a urinalysis if he or she suspects that you have a kidney stone. The urinalysis looks for blood, white blood cells, and crystals.  Your provider may also order tests of your blood and urine to find out what's causing the high levels uric acid.  What do my test results mean?  Many things may affect your lab test results. These include the method each lab uses to do the test. Even if your test results are different from the normal value, you may not have a problem. To learn what the results mean for you, talk with your healthcare provider.  Results are given in milligrams per deciliter (mg/dL). Here are results that may mean you have hyperuricemia:  · For females: higher than 6 mg/dL  · For males: higher than 7 mg/dL  Many health conditions can cause high levels of uric acid. These include cancer, kidney disease, hypothyroidism, hyperparathyroidism, and sarcoidosis.  Your uric acid levels may be high if you eat foods high in purines, such as organ meats, dried beans and peas, and certain fish - anchovies, herring, sardines, and  mackerel. High levels can also be caused by a low-salt diet.  How is this test done?  The test requires a blood sample, which is drawn through a needle from a vein in your arm.  Does this test pose any risks?  Taking a blood sample with a needle carries risks that include bleeding, infection, bruising, or feeling dizzy. When the needle pricks your arm, you may feel a slight stinging sensation or pain. Afterward, the site may be slightly sore.  What might affect my test results?  Certain medicines may affect your test results. These include:  · Aspirin and other medicines that contain salicylate  · Cyclosporine, a medicine sometimes used for autoimmune diseases  · Levodopa, a medicine used to treat Parkinson disease  · Certain diuretic medicines such as hydrochlorothiazide  · Vitamin B-3 (niacin)  Other things that may affect your test results include:  · Vigorous exercise  · Chemotherapy or radiation therapy to treat cancer  · Foods high in purines, including organ meats, mushrooms, some types of fish and seafood, and dried peas and beans   How do I get ready for this test?  Ask your healthcare provider if you should avoid any foods, beverages, or medications before the test.  Be sure your provider knows about all medicines, herbs, vitamins, and supplements you are taking. This includes medicines that don't need a prescription and any illicit drugs you may use.      © 2193-9089 [employer]. [address] [place] [address], [place] [address]. All rights reserved. This information is not intended as a substitute for professional medical care. Always follow your healthcare professional's instructions.    This office will follow-up with you on your uric acid results and if needed prescribe treatment until you can follow-up with primary care.  Please return here or go to the Emergency Department for any concerns or worsening of condition.  Please follow up with your primary care doctor or specialist as needed.    If  you  smoke, please stop smoking.

## 2018-04-06 NOTE — PATIENT INSTRUCTIONS
Treating Gout Attacks     Raising the joint above the level of your heart can help reduce gout symptoms.     Gout is a disease that affects the joints. It is caused by excess uric acid in your blood stream that may lead to crystals forming in your joints. Left untreated, it can lead to painful foot and joint deformities and even kidney problems. But, by treating gout early, you can relieve pain and help prevent future problems. Gout can usually be treated with medication and proper diet. In severe cases, surgery may be needed.  Gout attacks are painful and often happen more than once. Taking medications may reduce pain and prevent attacks in the future. There are also some things you can do at home to relieve symptoms.  Medications for gout  Your healthcare provider may prescribe a daily medication to reduce levels of uric acid. Reducing your uric acid levels may help prevent gout attacks. Allopurinol is one commonly used medication taken daily to reduce uric acid levels. Other medications can help relieve pain and swelling during an acute attack. Medicines such as NSAIDs (nonsteroidal anti-inflammatory medicines), steroids, and colchicine may be prescribed for intermittent use to relieve an acute gout attack. Be sure to take your medication as directed.  What you can do  Below are some things you can do at home to relieve gout symptoms. Your healthcare provider may have other tips.  · Rest the painful joint as much as you can.  · Raise the painful joint so it is at a level higher than your heart.  · Use ice for 10 minutes every 1-2 hours as possible.  How can I prevent gout?  With a little effort, you may be able to prevent gout attacks in the future. Here are some things you can do:  · Avoid foods high in purines  ¨ Certain meats (red meat, processed meat, turkey)  ¨ Organ meats (kidney, liver, sweetbread)  ¨ Shellfish (lobster, crab, shrimp, scallop, mussel)  ¨ Certain fish (anchovy, sardine, herring,  mackerel)  · Take any medications prescribed by your healthcare provider.  · Lose weight if you need to.  · Reduce high fructose corn syrup in meals and drinks.  · Reduce or eliminate consumption of alcohol, particularly beer, but also red wine and spirits.  · Control blood pressure, diabetes, and cholesterol.  · Drink plenty of water to help flush uric acid from your body.  Date Last Reviewed: 2/1/2016 © 2000-2017 [employer]. [address] [place] [address], [place] [address]. All rights reserved. This information is not intended as a substitute for professional medical care. Always follow your healthcare professional's instructions.        Uric Acid (Blood)  Does this test have other names?  Serum uric acid  What is this test?  This test measures the amount of uric acid in your blood.  Uric acid is a normal bodily waste product. It forms when chemicals called purines break down. Purines are a natural substance found in the body and are also found in many foods such as liver, shellfish, and alcohol. They can also be formed in the body when DNA is broken down.   When purines are broken down to uric acid in the blood, the body gets rid of it when you urinate or have a bowel movement. But if your body makes too much uric acid, or if your kidneys aren't working properly, uric acid can build up in the blood. Uric acid levels can also increase when you eat too many high-purine foods or take certain medicines like diuretics, aspirin, and niacin. Then crystals of uric acid can form and collect in the joints, causing painful inflammation. This condition is called gout.  Why do I need this test?  You might need this test if your healthcare provider wants to see whether you have high levels of uric acid in your blood. Your provider may recommend this test if you have symptoms of gout, although most people with hyperuricemia don't develop gout. Symptoms of gout include:  · Joint pain or tenderness  · Swelling in a joint  or reddened skin around a joint  · Swelling and pain in the big toe, ankle, or knee  · Joints that are hot to the touch  · Swelling and pain that affects only one joint in the body  · Skin that looks shiny and is red or purple  You may also need this test if you have symptoms of kidney stones. Symptoms include:  · Severe pain along your lower back. This may repeatedly get worse and then ease up. The pain may also travel to your genitals.  · Nausea  · Vomiting  · Urgent need to urinate  · Blood in your urine  What other tests might I have along with this test?  Your healthcare provider may also order other tests to diagnose gout, include looking at a sample of joint fluid drawn out with a needle.  Your provider may also order a urinalysis if he or she suspects that you have a kidney stone. The urinalysis looks for blood, white blood cells, and crystals.  Your provider may also order tests of your blood and urine to find out what's causing the high levels uric acid.  What do my test results mean?  Many things may affect your lab test results. These include the method each lab uses to do the test. Even if your test results are different from the normal value, you may not have a problem. To learn what the results mean for you, talk with your healthcare provider.  Results are given in milligrams per deciliter (mg/dL). Here are results that may mean you have hyperuricemia:  · For females: higher than 6 mg/dL  · For males: higher than 7 mg/dL  Many health conditions can cause high levels of uric acid. These include cancer, kidney disease, hypothyroidism, hyperparathyroidism, and sarcoidosis.  Your uric acid levels may be high if you eat foods high in purines, such as organ meats, dried beans and peas, and certain fish - anchovies, herring, sardines, and mackerel. High levels can also be caused by a low-salt diet.  How is this test done?  The test requires a blood sample, which is drawn through a needle from a vein in your  arm.  Does this test pose any risks?  Taking a blood sample with a needle carries risks that include bleeding, infection, bruising, or feeling dizzy. When the needle pricks your arm, you may feel a slight stinging sensation or pain. Afterward, the site may be slightly sore.  What might affect my test results?  Certain medicines may affect your test results. These include:  · Aspirin and other medicines that contain salicylate  · Cyclosporine, a medicine sometimes used for autoimmune diseases  · Levodopa, a medicine used to treat Parkinson disease  · Certain diuretic medicines such as hydrochlorothiazide  · Vitamin B-3 (niacin)  Other things that may affect your test results include:  · Vigorous exercise  · Chemotherapy or radiation therapy to treat cancer  · Foods high in purines, including organ meats, mushrooms, some types of fish and seafood, and dried peas and beans   How do I get ready for this test?  Ask your healthcare provider if you should avoid any foods, beverages, or medications before the test.  Be sure your provider knows about all medicines, herbs, vitamins, and supplements you are taking. This includes medicines that don't need a prescription and any illicit drugs you may use.      © 4090-5300 [employer]. [address] [place] [address], [place] [address]. All rights reserved. This information is not intended as a substitute for professional medical care. Always follow your healthcare professional's instructions.    This office will follow-up with you on your uric acid results and if needed prescribe treatment until you can follow-up with primary care.  Please return here or go to the Emergency Department for any concerns or worsening of condition.  Please follow up with your primary care doctor or specialist as needed.    If you  smoke, please stop smoking.

## 2018-04-06 NOTE — PLAN OF CARE
Problem: Patient Care Overview (Adult)  Goal: Plan of Care Review  Outcome: Ongoing (interventions implemented as appropriate)  Tolerated Procrit 10k units.

## 2018-04-07 ENCOUNTER — TELEPHONE (OUTPATIENT)
Dept: URGENT CARE | Facility: CLINIC | Age: 73
End: 2018-04-07

## 2018-04-07 LAB — URATE SERPL-MCNC: 6.1 MG/DL (ref 2.5–7.1)

## 2018-04-07 NOTE — TELEPHONE ENCOUNTER
----- Message from Philip Granger MD sent at 4/7/2018  9:08 AM CDT -----  Please notify patient that labs were within normal limits. Advise patient to follow up with PCP (or specialist)  as directed if symptoms persist.

## 2018-04-09 ENCOUNTER — OFFICE VISIT (OUTPATIENT)
Dept: FAMILY MEDICINE | Facility: CLINIC | Age: 73
End: 2018-04-09
Payer: MEDICARE

## 2018-04-09 ENCOUNTER — HOSPITAL ENCOUNTER (OUTPATIENT)
Dept: RADIOLOGY | Facility: HOSPITAL | Age: 73
Discharge: HOME | End: 2018-04-09
Attending: NURSE PRACTITIONER
Payer: MEDICARE

## 2018-04-09 VITALS
HEIGHT: 60 IN | HEART RATE: 90 BPM | SYSTOLIC BLOOD PRESSURE: 150 MMHG | WEIGHT: 119.69 LBS | BODY MASS INDEX: 23.5 KG/M2 | TEMPERATURE: 98 F | DIASTOLIC BLOOD PRESSURE: 88 MMHG | OXYGEN SATURATION: 97 %

## 2018-04-09 DIAGNOSIS — M79.675 GREAT TOE PAIN, LEFT: Primary | ICD-10-CM

## 2018-04-09 DIAGNOSIS — M79.675 GREAT TOE PAIN, LEFT: ICD-10-CM

## 2018-04-09 DIAGNOSIS — M25.572 ARTHRALGIA OF LEFT FOOT: ICD-10-CM

## 2018-04-09 PROCEDURE — 3077F SYST BP >= 140 MM HG: CPT | Mod: CPTII,S$GLB,, | Performed by: NURSE PRACTITIONER

## 2018-04-09 PROCEDURE — 3079F DIAST BP 80-89 MM HG: CPT | Mod: CPTII,S$GLB,, | Performed by: NURSE PRACTITIONER

## 2018-04-09 PROCEDURE — 99214 OFFICE O/P EST MOD 30 MIN: CPT | Mod: S$GLB,,, | Performed by: NURSE PRACTITIONER

## 2018-04-09 PROCEDURE — 99999 PR PBB SHADOW E&M-EST. PATIENT-LVL V: CPT | Mod: PBBFAC,,, | Performed by: NURSE PRACTITIONER

## 2018-04-09 PROCEDURE — 73630 X-RAY EXAM OF FOOT: CPT | Mod: TC,FY,PO,LT

## 2018-04-09 PROCEDURE — 73630 X-RAY EXAM OF FOOT: CPT | Mod: 26,LT,, | Performed by: RADIOLOGY

## 2018-04-09 NOTE — PATIENT INSTRUCTIONS
Arthralgia    Arthralgia is the term for pain in or around the joint. It is a symptom, not a disease. This pain may involve one or more joints. In some cases, the pain moves from joint to joint.  There are many causes for joint pain. These include:  · Injury  · Osteoarthritis (wearing out of the joint surface)  · Gout (inflammation of the joint due to crystals in the joint fluid)  · Infection inside the joint    · Bursitis (inflammation of the fluid-filled sacs around the joint)  · Autoimmune disorders such as rheumatoid arthritis or lupus  · Tendonitis (inflammation of chords that attach muscle to bone)  Home care  · Rest the involved joint(s) until your symptoms improve.   · You may be prescribed pain medicine. If none is prescribed, you may use acetaminophen or ibuprofen to control pain and inflammation.  Follow-up care  Follow up with your healthcare provider or as advised.  When to seek medical advice  Contact your healthcare provider right away if any of the following occurs:  · Pain, swelling, or redness of joint increases  · Pain worsens or recurs after a period of improvement  · Pain moves to other joints  · You cannot bear weight on the affected joint   · You cannot move the affected joint  · Joint appears deformed  · New rash appears  · Fever of 100.4ºF (38ºC) or higher, or as directed by your healthcare provider  Date Last Reviewed: 3/1/2017  © 7208-7568 [employer]. [address] [place] [address], [place] [address]. All rights reserved. This information is not intended as a substitute for professional medical care. Always follow your healthcare professional's instructions.

## 2018-04-11 NOTE — PROGRESS NOTES
Subjective:       Patient ID: Regino Lawrence is a 72 y.o. female.    Chief Complaint: Leg Pain (left leg)    Patient states that the pain starts from her left big toe and shoots up to her left knee.  Her left big toe is swollen & red. She has been to urgent care twice. She was treated two weeks ago for gout with Indocin but pain is still there. Her last Urgent care visit 04/06/2018 a uric acid was drawn and was normal.       Leg Pain    The incident occurred more than 1 week ago. The incident occurred at home. There was no injury mechanism. The pain is present in the left leg, left toes and left foot. The quality of the pain is described as aching and shooting. The pain is at a severity of 10/10. The pain is moderate. The pain has been constant since onset. Associated symptoms include numbness and tingling. Pertinent negatives include no inability to bear weight, loss of motion, loss of sensation or muscle weakness. Associated symptoms comments: Swelling to left great toe  . She reports no foreign bodies present. The symptoms are aggravated by movement, weight bearing and palpation. She has tried NSAIDs (gabapentin and indomethcin ) for the symptoms. The treatment provided mild relief.     Review of Systems   Constitutional: Negative for diaphoresis, fatigue and fever.   Respiratory: Negative for chest tightness and shortness of breath.    Cardiovascular: Negative for chest pain, palpitations and leg swelling.   Musculoskeletal: Positive for arthralgias and joint swelling. Negative for back pain, gait problem, myalgias and neck pain.   Neurological: Positive for tingling and numbness.   Hematological: Negative for adenopathy. Does not bruise/bleed easily.       Objective:      Physical Exam   Constitutional: She is oriented to person, place, and time. Vital signs are normal. She appears well-developed and well-nourished.   Cardiovascular: Normal rate, regular rhythm and normal heart sounds.    Pulmonary/Chest:  Effort normal and breath sounds normal.   Musculoskeletal:        Left foot: There is tenderness, bony tenderness and swelling. There is normal range of motion, normal capillary refill, no crepitus, no deformity and no laceration.   Neurological: She is alert and oriented to person, place, and time.   Skin: Skin is warm, dry and intact.   Psychiatric: She has a normal mood and affect.       Assessment:       1. Great toe pain, left    2. Arthralgia of left foot        Plan:       Regino was seen today for leg pain.    Diagnoses and all orders for this visit:    Great toe pain, left  -     X-Ray Foot Complete Left; Future  -     Sedimentation rate, manual; Future  -     Comprehensive metabolic panel; Future    Arthralgia of left foot  -     X-Ray Foot Complete Left; Future  -     Sedimentation rate, manual; Future  -     Comprehensive metabolic panel; Future    Home care  · Rest the involved joint(s) until your symptoms improve.   · You may be prescribed pain medicine. If none is prescribed, you may use acetaminophen or ibuprofen to control pain and inflammation.  Follow-up care  Follow up with your healthcare provider or as advised.  When to seek medical advice  Contact your healthcare provider right away if any of the following occurs:  · Pain, swelling, or redness of joint increases  · Pain worsens or recurs after a period of improvement  · Pain moves to other joints  · You cannot bear weight on the affected joint   · You cannot move the affected joint  · Joint appears deformed  · New rash appears  · Fever of 100.4ºF (38ºC) or higher, or as directed by your healthcare provider  Date Last Reviewed: 3/1/2017  © 0909-6163 [employer]. [address] [place] [address], [place] [address]. All rights reserved. This information is not intended as a substitute for professional medical care. Always follow your healthcare professional's instructions.

## 2018-04-18 RX ORDER — TRAMADOL HYDROCHLORIDE 50 MG/1
TABLET ORAL
Qty: 180 TABLET | Refills: 0 | Status: SHIPPED | OUTPATIENT
Start: 2018-04-18 | End: 2018-04-19 | Stop reason: SDUPTHER

## 2018-04-19 ENCOUNTER — OFFICE VISIT (OUTPATIENT)
Dept: FAMILY MEDICINE | Facility: CLINIC | Age: 73
End: 2018-04-19
Payer: MEDICARE

## 2018-04-19 VITALS
HEART RATE: 97 BPM | WEIGHT: 118.13 LBS | HEIGHT: 60 IN | BODY MASS INDEX: 23.19 KG/M2 | DIASTOLIC BLOOD PRESSURE: 60 MMHG | TEMPERATURE: 98 F | SYSTOLIC BLOOD PRESSURE: 120 MMHG | OXYGEN SATURATION: 96 %

## 2018-04-19 DIAGNOSIS — M51.36 DEGENERATIVE DISC DISEASE, LUMBAR: Primary | ICD-10-CM

## 2018-04-19 DIAGNOSIS — E11.8 CONTROLLED TYPE 2 DIABETES MELLITUS WITH COMPLICATION, WITHOUT LONG-TERM CURRENT USE OF INSULIN: Chronic | ICD-10-CM

## 2018-04-19 DIAGNOSIS — E87.6 HYPOKALEMIA: ICD-10-CM

## 2018-04-19 DIAGNOSIS — E11.69 COMBINED HYPERLIPIDEMIA ASSOCIATED WITH TYPE 2 DIABETES MELLITUS: Chronic | ICD-10-CM

## 2018-04-19 DIAGNOSIS — E78.2 COMBINED HYPERLIPIDEMIA ASSOCIATED WITH TYPE 2 DIABETES MELLITUS: Chronic | ICD-10-CM

## 2018-04-19 DIAGNOSIS — I10 ESSENTIAL HYPERTENSION: Chronic | ICD-10-CM

## 2018-04-19 DIAGNOSIS — D86.9 SARCOIDOSIS: Chronic | ICD-10-CM

## 2018-04-19 DIAGNOSIS — G72.9 MYOPATHY: ICD-10-CM

## 2018-04-19 DIAGNOSIS — D64.9 ANEMIA: Primary | ICD-10-CM

## 2018-04-19 DIAGNOSIS — Z79.52 CURRENT USE OF STEROID MEDICATION: ICD-10-CM

## 2018-04-19 DIAGNOSIS — E03.9 HYPOTHYROIDISM, UNSPECIFIED TYPE: ICD-10-CM

## 2018-04-19 PROCEDURE — 3078F DIAST BP <80 MM HG: CPT | Mod: CPTII,S$GLB,, | Performed by: FAMILY MEDICINE

## 2018-04-19 PROCEDURE — 3074F SYST BP LT 130 MM HG: CPT | Mod: CPTII,S$GLB,, | Performed by: FAMILY MEDICINE

## 2018-04-19 PROCEDURE — 3044F HG A1C LEVEL LT 7.0%: CPT | Mod: CPTII,S$GLB,, | Performed by: FAMILY MEDICINE

## 2018-04-19 PROCEDURE — 99999 PR PBB SHADOW E&M-EST. PATIENT-LVL IV: CPT | Mod: PBBFAC,,, | Performed by: FAMILY MEDICINE

## 2018-04-19 PROCEDURE — 99214 OFFICE O/P EST MOD 30 MIN: CPT | Mod: S$GLB,,, | Performed by: FAMILY MEDICINE

## 2018-04-19 PROCEDURE — 99499 UNLISTED E&M SERVICE: CPT | Mod: S$GLB,,, | Performed by: FAMILY MEDICINE

## 2018-04-19 RX ORDER — LISINOPRIL 40 MG/1
40 TABLET ORAL DAILY
Qty: 90 TABLET | Refills: 1 | Status: SHIPPED | OUTPATIENT
Start: 2018-04-19 | End: 2018-05-18

## 2018-04-19 RX ORDER — PREDNISONE 10 MG/1
10 TABLET ORAL DAILY
Qty: 90 TABLET | Refills: 1 | Status: CANCELLED | OUTPATIENT
Start: 2018-04-19

## 2018-04-19 RX ORDER — FENOFIBRATE 160 MG/1
160 TABLET ORAL DAILY
Qty: 90 TABLET | Refills: 1 | Status: SHIPPED | OUTPATIENT
Start: 2018-04-19 | End: 2019-04-05 | Stop reason: SDUPTHER

## 2018-04-19 RX ORDER — POTASSIUM CHLORIDE 20 MEQ/1
20 TABLET, EXTENDED RELEASE ORAL 2 TIMES DAILY
Qty: 180 TABLET | Refills: 1 | Status: ON HOLD | OUTPATIENT
Start: 2018-04-19 | End: 2018-10-08 | Stop reason: HOSPADM

## 2018-04-19 RX ORDER — CARVEDILOL 12.5 MG/1
12.5 TABLET ORAL 2 TIMES DAILY
Qty: 180 TABLET | Refills: 1 | Status: SHIPPED | OUTPATIENT
Start: 2018-04-19 | End: 2018-12-12 | Stop reason: SDUPTHER

## 2018-04-19 RX ORDER — TRAMADOL HYDROCHLORIDE 50 MG/1
TABLET ORAL
Qty: 180 TABLET | Refills: 0 | Status: SHIPPED | OUTPATIENT
Start: 2018-04-19 | End: 2018-06-07 | Stop reason: SDUPTHER

## 2018-04-19 RX ORDER — LEVOTHYROXINE SODIUM 50 UG/1
TABLET ORAL
Qty: 90 TABLET | Refills: 1 | Status: ON HOLD | OUTPATIENT
Start: 2018-04-19 | End: 2018-10-08 | Stop reason: HOSPADM

## 2018-04-19 RX ORDER — CLONIDINE HYDROCHLORIDE 0.3 MG/1
0.3 TABLET ORAL 3 TIMES DAILY
Qty: 270 TABLET | Refills: 1 | Status: SHIPPED | OUTPATIENT
Start: 2018-04-19 | End: 2019-03-28 | Stop reason: SDUPTHER

## 2018-04-19 NOTE — PROGRESS NOTES
Chief Complaint   Patient presents with    Leg Pain       HPI  Regino Lawrence is a 72 y.o. female with multiple medical diagnoses as listed in the medical history and problem list that presents for evaluation for two weeks of pain that began in her great toe along with swelling in both ankles and her leg along with numbness and tingling. She had a negative workup for gout and a negative  Xray also. The pain is no longer in her foot but is now traveling up her leg and both sides of her back hurt. She has been having pain and swelling in her wrists.     PAST MEDICAL HISTORY:  Past Medical History:   Diagnosis Date    Acid reflux     Allergy     Alopecia     Anemia     Anxiety     Arthritis     Back pain     Cataract     Chronic kidney disease     Depression     Diabetes mellitus, type 2     Eye injury as a child     k-abrasion  od    Hyperlipidemia     Hypertension     Hypothyroidism     Immune deficiency disorder     Immune disorder     Myalgia and myositis 2012    Osteoporosis     Polyneuropathy     Renal manifestation of secondary diabetes mellitus     Sarcoidosis     Ulcer     no cancer    Urinary incontinence        PAST SURGICAL HISTORY:  Past Surgical History:   Procedure Laterality Date    CARPAL TUNNEL RELEASE      Rt wrist    CATARACT EXTRACTION W/  INTRAOCULAR LENS IMPLANT Right 2015    Dr. Azevedo    CATARACT EXTRACTION W/  INTRAOCULAR LENS IMPLANT Left 2015    Dr. Azevedo     SECTION      CHOLECYSTECTOMY      TUBAL LIGATION         SOCIAL HISTORY:  Social History     Social History    Marital status:      Spouse name: N/A    Number of children: N/A    Years of education: N/A     Occupational History    Not on file.     Social History Main Topics    Smoking status: Never Smoker    Smokeless tobacco: Never Used    Alcohol use No    Drug use: No    Sexual activity: Yes     Partners: Male     Other Topics Concern    Not on file      Social History Narrative    No narrative on file       FAMILY HISTORY:  Family History   Problem Relation Age of Onset    Hypertension Mother     Cataracts Mother     No Known Problems Father     Hypertension Maternal Grandmother     Glaucoma Sister     Arthritis Sister     No Known Problems Brother     No Known Problems Maternal Aunt     No Known Problems Maternal Uncle     No Known Problems Paternal Aunt     No Known Problems Paternal Uncle     No Known Problems Maternal Grandfather     No Known Problems Paternal Grandmother     No Known Problems Paternal Grandfather     Kidney failure Sister     Hepatitis Sister     Cancer Sister      bone cancer     Immunodeficiency Sister     Lupus Neg Hx     Rheum arthritis Neg Hx     Amblyopia Neg Hx     Blindness Neg Hx     Diabetes Neg Hx     Macular degeneration Neg Hx     Retinal detachment Neg Hx     Strabismus Neg Hx     Stroke Neg Hx     Thyroid disease Neg Hx     Endometrial cancer Neg Hx     Vaginal cancer Neg Hx     Cervical cancer Neg Hx        ALLERGIES AND MEDICATIONS: updated and reviewed.  Review of patient's allergies indicates:   Allergen Reactions    Azathioprine Shortness Of Breath and Other (See Comments)     Fatigue     Current Outpatient Prescriptions   Medication Sig Dispense Refill    ACCU-CHEK FASTCLIX Kit USE AS DIRECTED. 1 each 0    ALCOHOL ANTISEPTIC PADS (ALCOHOL PREP PADS TOP)       blood sugar diagnostic Strp 1 each by Misc.(Non-Drug; Combo Route) route once daily. 100 strip 11    blood-glucose meter kit Use as instructed 1 each 0    calcium carbonate (OS-MALCOLM) 500 mg calcium (1,250 mg) tablet Take 1 tablet by mouth once daily.      carvedilol (COREG) 12.5 MG tablet Take 1 tablet (12.5 mg total) by mouth 2 (two) times daily. 180 tablet 1    cloNIDine (CATAPRES) 0.3 MG tablet Take 1 tablet (0.3 mg total) by mouth 3 (three) times daily. 270 tablet 1    cyclobenzaprine (FLEXERIL) 10 MG tablet Take 1  tablet (10 mg total) by mouth 3 (three) times daily as needed for Muscle spasms. 270 tablet 0    fenofibrate 160 MG Tab Take 1 tablet (160 mg total) by mouth once daily. 90 tablet 1    fexofenadine (ALLEGRA ALLERGY) 180 MG tablet Take 180 mg by mouth once daily.      indomethacin (INDOCIN) 50 MG capsule Take 1 capsule (50 mg total) by mouth 3 (three) times daily. 30 capsule 0    lancing device (ACCU-CHEK SOFTCLIX LANCET DEV) Misc Accu-chek FastClix kit 1 each 0    leflunomide (ARAVA) 10 MG Tab Take 1 tablet (10 mg total) by mouth once daily. 30 tablet 2    levothyroxine (SYNTHROID) 50 MCG tablet TAKE 1 TABLET (50 MCG TOTAL) BY MOUTH BEFORE BREAKFAST. 90 tablet 1    lisinopril (PRINIVIL,ZESTRIL) 40 MG tablet Take 1 tablet (40 mg total) by mouth once daily. 90 tablet 1    LUBRICANT EYE DROPS, GLYC-PG, 1-0.3 % Drop       metFORMIN (GLUCOPHAGE) 1000 MG tablet Take 1 tablet (1,000 mg total) by mouth 2 (two) times daily with meals. 180 tablet 1    ondansetron (ZOFRAN) 8 MG tablet Take 1 tablet (8 mg total) by mouth every 8 (eight) hours as needed for Nausea. 25 tablet 1    potassium chloride SA (K-DUR,KLOR-CON) 20 MEQ tablet Take 1 tablet (20 mEq total) by mouth 2 (two) times daily. 180 tablet 1    predniSONE (DELTASONE) 10 MG tablet TAKE 1 TABLET ONCE DAILY 90 tablet 1    traMADol (ULTRAM) 50 mg tablet TAKE TWO TABLETS BY MOUTH EVERY 4 TO 6 HOURS AS NEEDED FOR PAIN 180 tablet 0    amLODIPine (NORVASC) 10 MG tablet Take 0.5 tablets (5 mg total) by mouth once daily. 90 tablet 1    gabapentin (NEURONTIN) 400 MG capsule Take 1 capsule (400 mg total) by mouth 2 (two) times daily. 180 capsule 1     No current facility-administered medications for this visit.        ROS  Review of Systems   Constitutional: Negative for chills, diaphoresis, fatigue, fever and unexpected weight change.   HENT: Negative for rhinorrhea, sinus pressure, sore throat and tinnitus.    Eyes: Negative for photophobia and visual  disturbance.   Respiratory: Negative for cough, shortness of breath and wheezing.    Cardiovascular: Negative for chest pain and palpitations.   Gastrointestinal: Negative for abdominal pain, blood in stool, constipation, diarrhea, nausea and vomiting.   Genitourinary: Negative for dysuria, flank pain, frequency and vaginal discharge.   Musculoskeletal: Positive for arthralgias and myalgias. Negative for joint swelling.   Skin: Negative for rash.   Neurological: Negative for speech difficulty, weakness, light-headedness and headaches.   Psychiatric/Behavioral: Negative for behavioral problems and dysphoric mood.       Physical Exam  Vitals:    04/19/18 0943 04/19/18 1045   BP: (!) 140/82 120/60   Pulse: 97    Temp: 98.3 °F (36.8 °C)    SpO2: 96%    Weight: 53.6 kg (118 lb 1.6 oz)    Height: 5' (1.524 m)     Body mass index is 23.06 kg/m².  Weight: 53.6 kg (118 lb 1.6 oz)   Height: 5' (152.4 cm)     Physical Exam   Constitutional: She is oriented to person, place, and time. She appears well-developed and well-nourished.   HENT:   Head: Normocephalic and atraumatic.   Eyes: EOM are normal.   Musculoskeletal:   Pain with palpation along the great toe through the mid foot, shin, calf and posterior thigh radiating to the L spine L4-L5 and S1   Neurological: She is alert and oriented to person, place, and time. She displays no Babinski's sign on the right side. She displays no Babinski's sign on the left side.   Reflex Scores:       Patellar reflexes are 2+ on the right side and 2+ on the left side.  Skin: Skin is warm and dry. No rash noted. No erythema.   Psychiatric: She has a normal mood and affect. Her behavior is normal.   Nursing note and vitals reviewed.      Health Maintenance       Date Due Completion Date    Mammogram 04/18/2018 4/18/2017    Foot Exam 05/12/2018 5/12/2017 (Done)    Override on 5/12/2017: Done    Urine Microalbumin 05/18/2018 5/18/2017    Lipid Panel 05/08/2018 5/8/2017    Eye Exam 06/29/2018  6/29/2017    Hemoglobin A1c 07/22/2018 1/22/2018    DEXA SCAN 05/05/2020 5/5/2017    Colonoscopy 02/09/2025 2/9/2015 (Done)    Override on 2/9/2015: Done    Override on 2/18/2005: Done (reportedly normal)    TETANUS VACCINE 05/16/2026 5/16/2016            ASSESSMENT     1. Degenerative disc disease, lumbar    2. Sarcoidosis    3. Myopathy    4. Hypothyroidism, unspecified type    5. Combined 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    6. Essential hypertension    7. Hypokalemia    8. Current use of steroid medication    9. Controlled type 2 diabetes mellitus with complication, without long-term current use of insulin        PLAN:     Problem List Items Addressed This Visit        Neuro    Degenerative disc disease, lumbar - Primary  -I think her pain is being caused by her lower back and sciatica  -will consult home health with PT,r esume neurontin    Relevant Orders    Ambulatory referral to Home Health       Cardiac/Vascular    Combined 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 (Chronic)  -continue fenofibrate    Relevant Medications    fenofibrate 160 MG Tab    Essential hypertension (Chronic)  -recheck improved    Relevant Medications    lisinopril (PRINIVIL,ZESTRIL) 40 MG tablet    cloNIDine (CATAPRES) 0.3 MG tablet       Immunology/Multi System    Sarcoidosis (Chronic)  -continue prednisone, follow up with Rheum, likely has a flare of this too       Endocrine    Diabetes mellitus type 2, controlled (Chronic)  -needs foot exam, may be developing neuropathy    Relevant Orders    Ambulatory referral to Podiatry    Hypothyroidism (Chronic)  -refill synthroid    Relevant Medications    levothyroxine (SYNTHROID) 50 MCG tablet       Orthopedic    Myopathy  -continue prednisone       Other    Current use of steroid medication  -on prednisone      Other Visit Diagnoses     Hypokalemia      -continue prednisone    Relevant Medications    potassium chloride SA (K-DUR,KLOR-CON) 20 MEQ tablet             Micaela Mendoza MD  04/19/2018 10:23 AM        Follow-up in about 1 month (around 5/19/2018).

## 2018-04-20 ENCOUNTER — INFUSION (OUTPATIENT)
Dept: INFUSION THERAPY | Facility: HOSPITAL | Age: 73
End: 2018-04-20
Attending: INTERNAL MEDICINE
Payer: MEDICARE

## 2018-04-20 VITALS — DIASTOLIC BLOOD PRESSURE: 68 MMHG | HEART RATE: 81 BPM | RESPIRATION RATE: 16 BRPM | SYSTOLIC BLOOD PRESSURE: 123 MMHG

## 2018-04-20 DIAGNOSIS — Z53.1 REFUSAL OF BLOOD TRANSFUSIONS AS PATIENT IS JEHOVAH'S WITNESS: ICD-10-CM

## 2018-04-20 DIAGNOSIS — N18.9 ANEMIA IN CKD (CHRONIC KIDNEY DISEASE): Primary | ICD-10-CM

## 2018-04-20 DIAGNOSIS — D63.1 ANEMIA IN CKD (CHRONIC KIDNEY DISEASE): Primary | ICD-10-CM

## 2018-04-20 PROCEDURE — 96372 THER/PROPH/DIAG INJ SC/IM: CPT

## 2018-04-20 PROCEDURE — 63600175 PHARM REV CODE 636 W HCPCS: Mod: JG | Performed by: INTERNAL MEDICINE

## 2018-04-20 RX ADMIN — ERYTHROPOIETIN 10000 UNITS: 10000 INJECTION, SOLUTION INTRAVENOUS; SUBCUTANEOUS at 11:04

## 2018-04-20 NOTE — PLAN OF CARE
Problem: Patient Care Overview (Adult)  Goal: Plan of Care Review  Outcome: Ongoing (interventions implemented as appropriate)  Patient tolerated procrit injection. VSS. Received discharge instructions and verbalized understanding.

## 2018-04-23 ENCOUNTER — TELEPHONE (OUTPATIENT)
Dept: FAMILY MEDICINE | Facility: CLINIC | Age: 73
End: 2018-04-23

## 2018-04-23 DIAGNOSIS — G89.29 CHRONIC BILATERAL LOW BACK PAIN WITHOUT SCIATICA: Primary | ICD-10-CM

## 2018-04-23 DIAGNOSIS — M54.50 CHRONIC BILATERAL LOW BACK PAIN WITHOUT SCIATICA: Primary | ICD-10-CM

## 2018-04-23 NOTE — TELEPHONE ENCOUNTER
----- Message from Charmaine Link sent at 4/23/2018  1:31 PM CDT -----  Contact: Kasey [facility]  Kasey PT at [facility] states pt is more appropriate  for outpatient physical therapy and requests to get an order faxed to [facility]. She can be reached at [phone]. Thank you!

## 2018-04-27 ENCOUNTER — TELEPHONE (OUTPATIENT)
Dept: FAMILY MEDICINE | Facility: CLINIC | Age: 73
End: 2018-04-27

## 2018-04-27 NOTE — LETTER
April 27, 2018    Arfritz Lawrence  [address] [address]             [facility]  [address] [place] [id number]  Phone: [phone]  Fax: [phone] Dear Mrs. Lawrence:    Sorry we were unable to contact you to schedule your Podiatry appointment. Please give the referral department a call at [phone].      If you have any questions or concerns, please don't hesitate to call.    Sincerely,        Lauren Gonzalez MA

## 2018-05-02 ENCOUNTER — PATIENT MESSAGE (OUTPATIENT)
Dept: FAMILY MEDICINE | Facility: CLINIC | Age: 73
End: 2018-05-02

## 2018-05-04 ENCOUNTER — INFUSION (OUTPATIENT)
Dept: INFUSION THERAPY | Facility: HOSPITAL | Age: 73
End: 2018-05-04
Attending: INTERNAL MEDICINE
Payer: MEDICARE

## 2018-05-04 VITALS
OXYGEN SATURATION: 100 % | SYSTOLIC BLOOD PRESSURE: 111 MMHG | TEMPERATURE: 98 F | RESPIRATION RATE: 16 BRPM | DIASTOLIC BLOOD PRESSURE: 60 MMHG | HEART RATE: 85 BPM

## 2018-05-04 DIAGNOSIS — N18.9 ANEMIA IN CKD (CHRONIC KIDNEY DISEASE): Primary | ICD-10-CM

## 2018-05-04 DIAGNOSIS — Z53.1 REFUSAL OF BLOOD TRANSFUSIONS AS PATIENT IS JEHOVAH'S WITNESS: ICD-10-CM

## 2018-05-04 DIAGNOSIS — D63.1 ANEMIA IN CKD (CHRONIC KIDNEY DISEASE): Primary | ICD-10-CM

## 2018-05-04 PROCEDURE — 63600175 PHARM REV CODE 636 W HCPCS: Mod: JG | Performed by: INTERNAL MEDICINE

## 2018-05-04 PROCEDURE — 96372 THER/PROPH/DIAG INJ SC/IM: CPT

## 2018-05-04 RX ADMIN — ERYTHROPOIETIN 10000 UNITS: 10000 INJECTION, SOLUTION INTRAVENOUS; SUBCUTANEOUS at 11:05

## 2018-05-18 ENCOUNTER — INFUSION (OUTPATIENT)
Dept: INFUSION THERAPY | Facility: HOSPITAL | Age: 73
End: 2018-05-18
Attending: INTERNAL MEDICINE
Payer: MEDICARE

## 2018-05-18 ENCOUNTER — OFFICE VISIT (OUTPATIENT)
Dept: FAMILY MEDICINE | Facility: CLINIC | Age: 73
End: 2018-05-18
Payer: MEDICARE

## 2018-05-18 VITALS
OXYGEN SATURATION: 97 % | TEMPERATURE: 98 F | SYSTOLIC BLOOD PRESSURE: 140 MMHG | HEIGHT: 60 IN | BODY MASS INDEX: 23.62 KG/M2 | WEIGHT: 120.31 LBS | HEART RATE: 70 BPM | DIASTOLIC BLOOD PRESSURE: 80 MMHG

## 2018-05-18 VITALS
TEMPERATURE: 98 F | OXYGEN SATURATION: 100 % | DIASTOLIC BLOOD PRESSURE: 69 MMHG | RESPIRATION RATE: 17 BRPM | HEART RATE: 90 BPM | SYSTOLIC BLOOD PRESSURE: 142 MMHG

## 2018-05-18 DIAGNOSIS — I10 ESSENTIAL HYPERTENSION: Chronic | ICD-10-CM

## 2018-05-18 DIAGNOSIS — M51.36 DEGENERATIVE DISC DISEASE, LUMBAR: ICD-10-CM

## 2018-05-18 DIAGNOSIS — N18.9 ANEMIA IN CKD (CHRONIC KIDNEY DISEASE): Primary | ICD-10-CM

## 2018-05-18 DIAGNOSIS — T78.3XXA ANGIOEDEMA, INITIAL ENCOUNTER: Primary | ICD-10-CM

## 2018-05-18 DIAGNOSIS — Z53.1 REFUSAL OF BLOOD TRANSFUSIONS AS PATIENT IS JEHOVAH'S WITNESS: ICD-10-CM

## 2018-05-18 DIAGNOSIS — D63.1 ANEMIA IN CKD (CHRONIC KIDNEY DISEASE): Primary | ICD-10-CM

## 2018-05-18 DIAGNOSIS — E11.8 CONTROLLED TYPE 2 DIABETES MELLITUS WITH COMPLICATION, WITHOUT LONG-TERM CURRENT USE OF INSULIN: Chronic | ICD-10-CM

## 2018-05-18 PROCEDURE — 96372 THER/PROPH/DIAG INJ SC/IM: CPT

## 2018-05-18 PROCEDURE — 63600175 PHARM REV CODE 636 W HCPCS: Mod: JG | Performed by: INTERNAL MEDICINE

## 2018-05-18 PROCEDURE — 99214 OFFICE O/P EST MOD 30 MIN: CPT | Mod: S$GLB,,, | Performed by: FAMILY MEDICINE

## 2018-05-18 PROCEDURE — 99999 PR PBB SHADOW E&M-EST. PATIENT-LVL V: CPT | Mod: PBBFAC,,, | Performed by: FAMILY MEDICINE

## 2018-05-18 PROCEDURE — 99499 UNLISTED E&M SERVICE: CPT | Mod: S$GLB,,, | Performed by: FAMILY MEDICINE

## 2018-05-18 PROCEDURE — 3077F SYST BP >= 140 MM HG: CPT | Mod: CPTII,S$GLB,, | Performed by: FAMILY MEDICINE

## 2018-05-18 PROCEDURE — 3044F HG A1C LEVEL LT 7.0%: CPT | Mod: CPTII,S$GLB,, | Performed by: FAMILY MEDICINE

## 2018-05-18 PROCEDURE — 3079F DIAST BP 80-89 MM HG: CPT | Mod: CPTII,S$GLB,, | Performed by: FAMILY MEDICINE

## 2018-05-18 RX ORDER — PREDNISONE 20 MG/1
20 TABLET ORAL DAILY
Qty: 5 TABLET | Refills: 0 | Status: SHIPPED | OUTPATIENT
Start: 2018-05-18 | End: 2018-05-23

## 2018-05-18 RX ADMIN — ERYTHROPOIETIN 10000 UNITS: 10000 INJECTION, SOLUTION INTRAVENOUS; SUBCUTANEOUS at 09:05

## 2018-05-18 NOTE — PROGRESS NOTES
Chief Complaint   Patient presents with    Hypertension     follow up    Leg Swelling    Back Pain     Lower    Hip Pain     Both       HPI  Arfritz Lawrence is a 72 y.o. female with multiple medical diagnoses as listed in the medical history and problem list that presents for follow-up for hypertension and pains in her back and hips.s he has also been having leg swelling and swelling in her tongue that started this morning. She has had some difficulty swallowing and some trouble eating her food. No shortness of breath or wheezing.    PAST MEDICAL HISTORY:  Past Medical History:   Diagnosis Date    Acid reflux     Allergy     Alopecia     Anemia     Anxiety     Arthritis     Back pain     Cataract     Chronic kidney disease     Depression     Diabetes mellitus, type 2     Eye injury as a child     k-abrasion  od    Hyperlipidemia     Hypertension     Hypothyroidism     Immune deficiency disorder     Immune disorder     Myalgia and myositis 2012    Osteoporosis     Polyneuropathy     Renal manifestation of secondary diabetes mellitus     Sarcoidosis     Ulcer     no cancer    Urinary incontinence        PAST SURGICAL HISTORY:  Past Surgical History:   Procedure Laterality Date    CARPAL TUNNEL RELEASE      Rt wrist    CATARACT EXTRACTION W/  INTRAOCULAR LENS IMPLANT Right 2015    Dr. Azevedo    CATARACT EXTRACTION W/  INTRAOCULAR LENS IMPLANT Left 2015    Dr. Azevedo     SECTION      CHOLECYSTECTOMY      TUBAL LIGATION         SOCIAL HISTORY:  Social History     Social History    Marital status:      Spouse name: N/A    Number of children: N/A    Years of education: N/A     Occupational History    Not on file.     Social History Main Topics    Smoking status: Never Smoker    Smokeless tobacco: Never Used    Alcohol use No    Drug use: No    Sexual activity: Yes     Partners: Male     Other Topics Concern    Not on file     Social History  Narrative    No narrative on file       FAMILY HISTORY:  Family History   Problem Relation Age of Onset    Hypertension Mother     Cataracts Mother     No Known Problems Father     Hypertension Maternal Grandmother     Glaucoma Sister     Arthritis Sister     No Known Problems Brother     No Known Problems Maternal Aunt     No Known Problems Maternal Uncle     No Known Problems Paternal Aunt     No Known Problems Paternal Uncle     No Known Problems Maternal Grandfather     No Known Problems Paternal Grandmother     No Known Problems Paternal Grandfather     Kidney failure Sister     Hepatitis Sister     Cancer Sister         bone cancer     Immunodeficiency Sister     Lupus Neg Hx     Rheum arthritis Neg Hx     Amblyopia Neg Hx     Blindness Neg Hx     Diabetes Neg Hx     Macular degeneration Neg Hx     Retinal detachment Neg Hx     Strabismus Neg Hx     Stroke Neg Hx     Thyroid disease Neg Hx     Endometrial cancer Neg Hx     Vaginal cancer Neg Hx     Cervical cancer Neg Hx        ALLERGIES AND MEDICATIONS: updated and reviewed.  Review of patient's allergies indicates:   Allergen Reactions    Azathioprine Shortness Of Breath and Other (See Comments)     Fatigue     Current Outpatient Prescriptions   Medication Sig Dispense Refill    ACCU-CHEK FASTCLIX Kit USE AS DIRECTED. 1 each 0    ALCOHOL ANTISEPTIC PADS (ALCOHOL PREP PADS TOP)       blood sugar diagnostic Strp 1 each by Misc.(Non-Drug; Combo Route) route once daily. 100 strip 11    blood-glucose meter kit Use as instructed 1 each 0    calcium carbonate (OS-MALCOLM) 500 mg calcium (1,250 mg) tablet Take 1 tablet by mouth once daily.      carvedilol (COREG) 12.5 MG tablet Take 1 tablet (12.5 mg total) by mouth 2 (two) times daily. 180 tablet 1    cloNIDine (CATAPRES) 0.3 MG tablet Take 1 tablet (0.3 mg total) by mouth 3 (three) times daily. 270 tablet 1    cyclobenzaprine (FLEXERIL) 10 MG tablet Take 1 tablet (10 mg total)  by mouth 3 (three) times daily as needed for Muscle spasms. 270 tablet 0    fenofibrate 160 MG Tab Take 1 tablet (160 mg total) by mouth once daily. 90 tablet 1    fexofenadine (ALLEGRA ALLERGY) 180 MG tablet Take 180 mg by mouth once daily.      indomethacin (INDOCIN) 50 MG capsule Take 1 capsule (50 mg total) by mouth 3 (three) times daily. 30 capsule 0    lancing device (ACCU-CHEK SOFTCLIX LANCET DEV) Misc Accu-chek FastClix kit 1 each 0    leflunomide (ARAVA) 10 MG Tab Take 1 tablet (10 mg total) by mouth once daily. 30 tablet 2    levothyroxine (SYNTHROID) 50 MCG tablet TAKE 1 TABLET (50 MCG TOTAL) BY MOUTH BEFORE BREAKFAST. 90 tablet 1    LUBRICANT EYE DROPS, GLYC-PG, 1-0.3 % Drop       metFORMIN (GLUCOPHAGE) 1000 MG tablet Take 1 tablet (1,000 mg total) by mouth 2 (two) times daily with meals. 180 tablet 1    ondansetron (ZOFRAN) 8 MG tablet Take 1 tablet (8 mg total) by mouth every 8 (eight) hours as needed for Nausea. 25 tablet 1    potassium chloride SA (K-DUR,KLOR-CON) 20 MEQ tablet Take 1 tablet (20 mEq total) by mouth 2 (two) times daily. 180 tablet 1    predniSONE (DELTASONE) 10 MG tablet TAKE 1 TABLET ONCE DAILY 90 tablet 1    traMADol (ULTRAM) 50 mg tablet TAKE TWO TABLETS BY MOUTH EVERY 4 TO 6 HOURS AS NEEDED FOR PAIN 180 tablet 0    amLODIPine (NORVASC) 10 MG tablet Take 0.5 tablets (5 mg total) by mouth once daily. 90 tablet 1    gabapentin (NEURONTIN) 400 MG capsule Take 1 capsule (400 mg total) by mouth 2 (two) times daily. 180 capsule 1    predniSONE (DELTASONE) 20 MG tablet Take 1 tablet (20 mg total) by mouth once daily. 5 tablet 0     No current facility-administered medications for this visit.        ROS  Review of Systems   Constitutional: Negative for chills, diaphoresis, fatigue, fever and unexpected weight change.   HENT: Positive for facial swelling. Negative for congestion, rhinorrhea, sinus pressure, sore throat and tinnitus.    Eyes: Negative for photophobia and  visual disturbance.   Respiratory: Negative for cough, shortness of breath and wheezing.    Cardiovascular: Negative for chest pain and palpitations.   Gastrointestinal: Negative for abdominal pain, blood in stool, constipation, diarrhea, nausea and vomiting.   Genitourinary: Negative for dysuria, flank pain, frequency and vaginal discharge.   Musculoskeletal: Positive for arthralgias and back pain. Negative for joint swelling.   Skin: Negative for rash.   Neurological: Negative for speech difficulty, weakness, light-headedness and headaches.   Psychiatric/Behavioral: Negative for behavioral problems and dysphoric mood.       Physical Exam  Vitals:    05/18/18 1017   BP: (!) 140/80   BP Location: Right arm   Patient Position: Sitting   BP Method: Medium (Manual)   Pulse: 70   Temp: 98 °F (36.7 °C)   TempSrc: Oral   SpO2: 97%   Weight: 54.6 kg (120 lb 4.8 oz)   Height: 5' (1.524 m)    Body mass index is 23.49 kg/m².  Weight: 54.6 kg (120 lb 4.8 oz)   Height: 5' (152.4 cm)     Physical Exam   Constitutional: She is oriented to person, place, and time. She appears well-developed and well-nourished. No distress.   HENT:   Head: Normocephalic and atraumatic.   Mouth/Throat: No oropharyngeal exudate.   Tongue swelling bilaterally but greatest on the left side, no LAD bilaterally, some edema present in both cheeks   Eyes: EOM are normal.   Neck: Neck supple.   Cardiovascular: Normal rate and regular rhythm.  Exam reveals no gallop and no friction rub.    No murmur heard.  Pulmonary/Chest: Effort normal and breath sounds normal. No respiratory distress. She has no wheezes. She has no rales.   Lymphadenopathy:     She has no cervical adenopathy.   Neurological: She is alert and oriented to person, place, and time.   Skin: Skin is warm and dry. No rash noted.   Psychiatric: She has a normal mood and affect. Her behavior is normal.   Nursing note and vitals reviewed.      Health Maintenance       Date Due Completion Date     Lipid Panel 05/08/2018 5/8/2017    Foot Exam 05/12/2018 5/12/2017 (Done)    Override on 5/12/2017: Done    Mammogram 04/18/2018 4/18/2017    Urine Microalbumin 05/18/2018 5/18/2017    Eye Exam 06/29/2018 6/29/2017    Hemoglobin A1c 07/22/2018 1/22/2018    Influenza Vaccine 08/01/2018 9/27/2017 (Done)    Override on 9/27/2017: Done    DEXA SCAN 05/05/2020 5/5/2017    Colonoscopy 02/09/2025 2/9/2015 (Done)    Override on 2/9/2015: Done    Override on 2/18/2005: Done (reportedly normal)    TETANUS VACCINE 05/16/2026 5/16/2016            ASSESSMENT     1. Angioedema, initial encounter    2. Degenerative disc disease, lumbar    3. Essential hypertension    4. Controlled type 2 diabetes mellitus with complication, without long-term current use of insulin        PLAN:     Problem List Items Addressed This Visit        Neuro    Degenerative disc disease, lumbar  -she has been referred to PT, she was given the number to call as she needs to schedule an appointment       Cardiac/Vascular    Essential hypertension (Chronic)  -d/c lisinopril, start taking a whole norvasc pill, continue tracking blood pressure       Endocrine    Diabetes mellitus type 2, controlled (Chronic)  -sugars will elevate but she can tolerate this      Other Visit Diagnoses     Angioedema, initial encounter    -  Primary  -d/c lisinopril, add 20mg predisone to her baseline of 10mg    Relevant Medications    predniSONE (DELTASONE) 20 MG tablet            Micaela Mendoza MD  05/18/2018 10:52 AM        Follow-up in about 1 month (around 6/18/2018) for Follow up.

## 2018-05-18 NOTE — PLAN OF CARE
Problem: Patient Care Overview (Adult)  Goal: Plan of Care Review  Outcome: Ongoing (interventions implemented as appropriate)  Patient received Procrit injection. Tolerated well. VSS. Received discharge instructions and verbalized understanding.

## 2018-05-28 ENCOUNTER — OFFICE VISIT (OUTPATIENT)
Dept: HEMATOLOGY/ONCOLOGY | Facility: CLINIC | Age: 73
End: 2018-05-28
Payer: MEDICARE

## 2018-05-28 VITALS
SYSTOLIC BLOOD PRESSURE: 174 MMHG | WEIGHT: 110.31 LBS | BODY MASS INDEX: 21.55 KG/M2 | OXYGEN SATURATION: 98 % | TEMPERATURE: 99 F | DIASTOLIC BLOOD PRESSURE: 98 MMHG | HEART RATE: 100 BPM

## 2018-05-28 DIAGNOSIS — Z53.1 REFUSAL OF BLOOD TRANSFUSIONS AS PATIENT IS JEHOVAH'S WITNESS: ICD-10-CM

## 2018-05-28 DIAGNOSIS — D63.1 ANEMIA IN CHRONIC KIDNEY DISEASE, UNSPECIFIED CKD STAGE: Primary | ICD-10-CM

## 2018-05-28 DIAGNOSIS — N18.9 ANEMIA IN CHRONIC KIDNEY DISEASE, UNSPECIFIED CKD STAGE: Primary | ICD-10-CM

## 2018-05-28 PROCEDURE — 99999 PR PBB SHADOW E&M-EST. PATIENT-LVL IV: CPT | Mod: PBBFAC,,, | Performed by: INTERNAL MEDICINE

## 2018-05-28 PROCEDURE — 99499 UNLISTED E&M SERVICE: CPT | Mod: S$GLB,,, | Performed by: INTERNAL MEDICINE

## 2018-05-28 PROCEDURE — 3080F DIAST BP >= 90 MM HG: CPT | Mod: CPTII,S$GLB,, | Performed by: INTERNAL MEDICINE

## 2018-05-28 PROCEDURE — 3077F SYST BP >= 140 MM HG: CPT | Mod: CPTII,S$GLB,, | Performed by: INTERNAL MEDICINE

## 2018-05-28 PROCEDURE — 99213 OFFICE O/P EST LOW 20 MIN: CPT | Mod: S$GLB,,, | Performed by: INTERNAL MEDICINE

## 2018-05-28 NOTE — PROGRESS NOTES
Subjective:       Patient ID: Regino Lawrence is a 72 y.o. female.    Chief Complaint: No chief complaint on file.  Diagnosis: Anemia in CKD  Patient is a [employer]ology  .  HPI    The patient is seen today for chronic anemia in CKD. The patient reports that she has been diagnosed with JOLLY in the past.  She has been on oral iron supplementation therapy, but could not tolerate or did not respond, she is uncertain.  She is followed by GI and has undergone a colonoscopy earlier this year and was diagnosed with hemorrhoids for which she underwent banding procedure.  No melena, hematochezia,change in bowel habits.  She has also been diagnosed with B12 deficiency in the past, but reports she did not respond to B12 injections. No history of blood transfusions.  She is a [employer].  She reports that she remembers getting injections in the 1970s when her blood count was low.        She is followed by Rheumatology for history of sarcoidosis with associated myopathy and arthropathy.   .She has been treated in the  past with methotrexate and Plaquenil, both of which were ineffective  Cellcept and imuran caused some unknown side effect.   Colchicine  held due to low WBC   She continues on chronic steroid therapy, Prednisone 10mg qd    Today, she has no new issues  Chronic diffuse arthralgias-stable  Chronic LBP- stable.   She ambulates with cane  Mild  Chronic fatigue-stable  No SOB/CP    She continues to undergo Procrit therapy q 2wks  She undergoes intermittent IV iron therapy        CBC  reveals wbc 7.08/mm3  Hb 9.7g/dl Hct 29.6  % Plt ct 286k    PAST MEDICAL HISTORY:  Acid reflux, alopecia, anemia, anxiety disorder, chronic  kidney disease, depression, diabetes mellitus type 2, hyperlipidemia,  hypertension, hypothyroidism, osteoporosis, sarcoidosis.    PAST SURGICAL HISTORY:  Cholecystectomy, , tubal ligation, carpal tunnel release, cataracts.    FAMILY HISTORY: Unremarkable for cancer. Significant for  HTN.       Review of Systems   Constitutional: Negative for activity change, appetite change and fatigue.   HENT: Negative for hearing loss and nosebleeds.    Eyes: Negative for visual disturbance.   Respiratory: Negative for cough and shortness of breath.    Cardiovascular: Negative for chest pain and leg swelling.   Gastrointestinal: Negative for abdominal pain, constipation, diarrhea and nausea.   Genitourinary: Negative for flank pain and urgency.   Musculoskeletal: Positive for arthralgias, back pain, gait problem and joint swelling.   Skin: Negative for rash.        No petechiae, ecchymoses   Neurological: Negative for light-headedness and headaches.   Hematological: Negative for adenopathy. Does not bruise/bleed easily.       Objective:       Vitals:    05/28/18 1039   BP: (!) 174/98   BP Location: Left arm   Patient Position: Sitting   BP Method: Small (Automatic)   Pulse: 100   Temp: 98.5 °F (36.9 °C)   TempSrc: Oral   SpO2: 98%   Weight: 50 kg (110 lb 5.4 oz)       Physical Exam   Constitutional: She is oriented to person, place, and time. She appears well-developed and well-nourished.   HENT:   Head: Normocephalic.   Mouth/Throat: Oropharynx is clear and moist. No oropharyngeal exudate.   Eyes: Conjunctivae and lids are normal. Pupils are equal, round, and reactive to light. No scleral icterus.   Neck: Normal range of motion. Neck supple. No thyromegaly present.   Cardiovascular: Normal rate, regular rhythm and normal heart sounds.    No murmur heard.  Pulmonary/Chest: Breath sounds normal. She has no wheezes. She has no rales.   Abdominal: Soft. Bowel sounds are normal. She exhibits no distension and no mass. There is no hepatosplenomegaly. There is no tenderness. There is no rebound and no guarding.   Musculoskeletal: Normal range of motion. She exhibits no edema or tenderness.   Lymphadenopathy:     She has no cervical adenopathy.     She has no axillary adenopathy.        Right: No supraclavicular  adenopathy present.        Left: No supraclavicular adenopathy present.   Neurological: She is alert and oriented to person, place, and time. No cranial nerve deficit. Coordination normal.   Skin: Skin is warm and dry. No ecchymosis, no petechiae and no rash noted. No erythema.   Psychiatric: She has a normal mood and affect.             Lab Results   Component Value Date    WBC 7.08 05/18/2018    HGB 9.7 (L) 05/18/2018    HCT 29.6 (L) 05/18/2018    MCV 96 05/18/2018     05/18/2018     Lab Results   Component Value Date    IRON 63 03/23/2018    TIBC 400 03/23/2018    FERRITIN 105 03/23/2018     SPEP-nl          CT renal 3/6/2017   IMPRESSION:  1.  No renal, ureteral or bladder calculi.  No hydronephrosis or ureterectasis.  2.  Poorly distended bladder.  Mild bladder wall prominence.  Mild cystitis cannot be   excluded.  3.  Moderate constipation.  Normal appendix      Lab Results   Component Value Date    IRON 63 03/23/2018    TIBC 400 03/23/2018    FERRITIN 105 03/23/2018       Assessment:       1. Anemia in chronic kidney disease, unspecified CKD stage    2. Refusal of blood transfusions as patient is Religious        Plan:   1-2 Pt clinically stable  Pt is a Rastafarian and declines/not interested in blood and blood products due to [employer] beliefs  Hb 9.7 g/dl -stable  Ferritin 105  Increase procrit to 20,000u q 2wk    CBC q2wks STANDING      Follow-up with PCP for med mgmt    F/u 2 mos with Fe studies        CC: Micaela Mendoza M.D.

## 2018-06-01 ENCOUNTER — LAB VISIT (OUTPATIENT)
Dept: LAB | Facility: HOSPITAL | Age: 73
End: 2018-06-01
Attending: INTERNAL MEDICINE
Payer: MEDICARE

## 2018-06-01 DIAGNOSIS — N18.9 ANEMIA IN CHRONIC KIDNEY DISEASE, UNSPECIFIED CKD STAGE: ICD-10-CM

## 2018-06-01 DIAGNOSIS — D63.1 ANEMIA IN CHRONIC KIDNEY DISEASE, UNSPECIFIED CKD STAGE: ICD-10-CM

## 2018-06-01 LAB
BASOPHILS # BLD AUTO: 0.02 K/UL
BASOPHILS NFR BLD: 0.3 %
DIFFERENTIAL METHOD: ABNORMAL
EOSINOPHIL # BLD AUTO: 0 K/UL
EOSINOPHIL NFR BLD: 0.4 %
ERYTHROCYTE [DISTWIDTH] IN BLOOD BY AUTOMATED COUNT: 13 %
FERRITIN SERPL-MCNC: 85 NG/ML
HCT VFR BLD AUTO: 33.9 %
HGB BLD-MCNC: 11.2 G/DL
IRON SERPL-MCNC: 87 UG/DL
LYMPHOCYTES # BLD AUTO: 2.2 K/UL
LYMPHOCYTES NFR BLD: 27.5 %
MCH RBC QN AUTO: 31.9 PG
MCHC RBC AUTO-ENTMCNC: 33 G/DL
MCV RBC AUTO: 97 FL
MONOCYTES # BLD AUTO: 0.9 K/UL
MONOCYTES NFR BLD: 11 %
NEUTROPHILS # BLD AUTO: 4.8 K/UL
NEUTROPHILS NFR BLD: 60.3 %
PLATELET # BLD AUTO: 288 K/UL
PMV BLD AUTO: 8.7 FL
RBC # BLD AUTO: 3.51 M/UL
SATURATED IRON: 24 %
TOTAL IRON BINDING CAPACITY: 361 UG/DL
TRANSFERRIN SERPL-MCNC: 244 MG/DL
WBC # BLD AUTO: 8 K/UL

## 2018-06-01 PROCEDURE — 85025 COMPLETE CBC W/AUTO DIFF WBC: CPT

## 2018-06-01 PROCEDURE — 82728 ASSAY OF FERRITIN: CPT

## 2018-06-01 PROCEDURE — 83540 ASSAY OF IRON: CPT

## 2018-06-01 PROCEDURE — 36415 COLL VENOUS BLD VENIPUNCTURE: CPT

## 2018-06-07 RX ORDER — TRAMADOL HYDROCHLORIDE 50 MG/1
TABLET ORAL
Qty: 180 TABLET | Refills: 0 | Status: SHIPPED | OUTPATIENT
Start: 2018-06-07 | End: 2018-07-25 | Stop reason: SDUPTHER

## 2018-06-08 RX ORDER — TRAMADOL HYDROCHLORIDE 50 MG/1
TABLET ORAL
Qty: 180 TABLET | Refills: 0 | OUTPATIENT
Start: 2018-06-08

## 2018-06-11 ENCOUNTER — OFFICE VISIT (OUTPATIENT)
Dept: RHEUMATOLOGY | Facility: CLINIC | Age: 73
End: 2018-06-11
Payer: MEDICARE

## 2018-06-11 VITALS
WEIGHT: 121.88 LBS | DIASTOLIC BLOOD PRESSURE: 85 MMHG | BODY MASS INDEX: 23.81 KG/M2 | HEART RATE: 97 BPM | SYSTOLIC BLOOD PRESSURE: 158 MMHG

## 2018-06-11 DIAGNOSIS — G72.9 MYOPATHY: ICD-10-CM

## 2018-06-11 DIAGNOSIS — M85.80 OSTEOPENIA, UNSPECIFIED LOCATION: ICD-10-CM

## 2018-06-11 DIAGNOSIS — D84.9 IMMUNOSUPPRESSION: Chronic | ICD-10-CM

## 2018-06-11 DIAGNOSIS — D86.9 SARCOIDOSIS: Primary | Chronic | ICD-10-CM

## 2018-06-11 DIAGNOSIS — R53.83 FATIGUE, UNSPECIFIED TYPE: ICD-10-CM

## 2018-06-11 PROCEDURE — 99999 PR PBB SHADOW E&M-EST. PATIENT-LVL III: CPT | Mod: PBBFAC,,, | Performed by: INTERNAL MEDICINE

## 2018-06-11 PROCEDURE — 3077F SYST BP >= 140 MM HG: CPT | Mod: CPTII,S$GLB,, | Performed by: INTERNAL MEDICINE

## 2018-06-11 PROCEDURE — 3079F DIAST BP 80-89 MM HG: CPT | Mod: CPTII,S$GLB,, | Performed by: INTERNAL MEDICINE

## 2018-06-11 PROCEDURE — 99214 OFFICE O/P EST MOD 30 MIN: CPT | Mod: S$GLB,,, | Performed by: INTERNAL MEDICINE

## 2018-06-11 PROCEDURE — 99499 UNLISTED E&M SERVICE: CPT | Mod: S$PBB,,, | Performed by: INTERNAL MEDICINE

## 2018-06-11 RX ORDER — METHOTREXATE 2.5 MG/1
10 TABLET ORAL
Qty: 48 TABLET | Refills: 0 | Status: SHIPPED | OUTPATIENT
Start: 2018-06-11 | End: 2018-08-29 | Stop reason: SDUPTHER

## 2018-06-11 RX ORDER — FOLIC ACID 1 MG/1
1 TABLET ORAL DAILY
Qty: 90 TABLET | Refills: 0 | Status: SHIPPED | OUTPATIENT
Start: 2018-06-11 | End: 2018-10-02 | Stop reason: SDUPTHER

## 2018-06-11 NOTE — PROGRESS NOTES
Subjective:       Patient ID: Regino Lawrence is a 72 y.o. female.    Chief Complaint: Sarcoidosis    HPI:  Regino Lawrence is a 72 y.o. female with history of sarcoidosis with associated myopathy and   arthropathy. Sarcoidosis that was first manifested by muscle inflammation, low white   blood cell count, hair loss, skin involvement. She was treated in the   past with methotrexate and Plaquenil, both of which were ineffective.   Cellcept and Imuran caused some unknown side effect (she thinks it made her sick).   Colchicine was held due to low WBC.   Although methotrexate did not help in past it was retried and she felt it helped hair growth but did not help body aches.   She held MTX due to an URI but patient has not wanted restarted since then (2013).   Leflunomide was held due to elevated BP after less than a week of use.    Interval History:   Developed angioedema with lisinopril and increased prednisone from primary doctor helped.  Severe neck pain.  Decreased ROM due to pain.  Unable to hold head up.  Has not seen neurosurgery since last year.    Fell suddenly x2.  Two times while sitting in chair her body pulled to right and caused her to fall out of chair.   Hands swell periodically.  Darker appearance to right hand.  Swelling in hands.  Decreased blood in right hand when checking finger stick.   Pain 10/10 ache in hands, feet and neck.  Pain medication helps some.  Activity worsens.      Review of Systems   Constitutional: Positive for fatigue.   HENT:        Dry mouth   Eyes:        Dry eyes   Respiratory: Positive for shortness of breath.    Cardiovascular: Negative.    Gastrointestinal: Negative.    Endocrine: Negative.    Genitourinary: Negative.    Musculoskeletal: Positive for arthralgias and back pain.   Allergic/Immunologic: Negative.    Neurological: Negative.    Hematological: Negative.    Psychiatric/Behavioral: Negative.          Objective:   BP (!) 158/85 (BP Location: Right arm, Patient  Position: Sitting, BP Method: Medium (Automatic))   Pulse 97   Wt 55.3 kg (121 lb 14.4 oz)   LMP  (LMP Unknown)   BMI 23.81 kg/m²      Physical Exam   Constitutional: She is oriented to person, place, and time and well-developed, well-nourished, and in no distress.   HENT:   Head: Normocephalic and atraumatic.   Eyes: Conjunctivae and EOM are normal.   Cardiovascular: Normal rate, regular rhythm and normal heart sounds.    Pulmonary/Chest: Effort normal and breath sounds normal.   Abdominal: Soft. Bowel sounds are normal.   Neurological: She is alert and oriented to person, place, and time.   Slow careful gait.  Walker in room   Skin: Skin is warm and dry.     Psychiatric: Mood and affect normal.   Musculoskeletal:   4.5/5 UE and LE proximal strength bilaterally  28 joint count: 10 tender and 10 swollen.  Swollen and tender MCPs               Assessment:       1.   Sarcoidosis. Manifested by myopathy and arthropathy. Persistent joint pain and myalgias despite prednisone 10 mg.  Now with diffuse body pain    2.   Myalgia and myositis.    3.   Osteopenia. Took Fosamax for 5 years stopped 6/2013.    4.   Fatigue     5.   Diabetes mellitus type 2 in nonobese     6.           Neck pain. X-ray with degenerative changes. S/p laminectomy-cervical fusion C3-C7 11/16/2015 for cervical spinal stenosis.    7.           Back pain    8.           HTN.  9.           SOB.  When blood count low  10.         Anemia.  On Procrit    Plan:       1. Labs   2. Continue prednisone 10 mg daily.  Continue to hold leflunomide 10 mg daily due to elevated BP.   Will give folic acid with MTX 4 tabs weekly to start after TB test.   Repeat labs one month after starting MTX.  3. Continue tramadol for pain. Can take 3 times daily.   4. Following with nephrology  5. DEXA with osteopenia of hip total and femoral neck. FRAX does not suggest treatment however with prednisone>7.5 mg will consider Prolia (due renal insufficiency).  Information  provided for patient to review.  She read information but still needs to see dentist to have teeth pulled.  6.  Follow with Dr. Conner regarding limited neck ROM and pain                 RTO in 3-4 months.    Patient seen face to face for 25 minutes and greater than 50% spent in counseling regarding Joint pains,   management of sarcoidosis and pain.

## 2018-06-12 ENCOUNTER — OFFICE VISIT (OUTPATIENT)
Dept: FAMILY MEDICINE | Facility: CLINIC | Age: 73
End: 2018-06-12
Payer: MEDICARE

## 2018-06-12 ENCOUNTER — PATIENT MESSAGE (OUTPATIENT)
Dept: RHEUMATOLOGY | Facility: CLINIC | Age: 73
End: 2018-06-12

## 2018-06-12 VITALS
OXYGEN SATURATION: 96 % | BODY MASS INDEX: 23.69 KG/M2 | SYSTOLIC BLOOD PRESSURE: 124 MMHG | HEART RATE: 80 BPM | DIASTOLIC BLOOD PRESSURE: 70 MMHG | TEMPERATURE: 98 F | HEIGHT: 60 IN | WEIGHT: 120.69 LBS

## 2018-06-12 DIAGNOSIS — I10 ESSENTIAL HYPERTENSION: Chronic | ICD-10-CM

## 2018-06-12 DIAGNOSIS — Z12.31 ENCOUNTER FOR SCREENING MAMMOGRAM FOR BREAST CANCER: ICD-10-CM

## 2018-06-12 DIAGNOSIS — D86.9 SARCOIDOSIS: Chronic | ICD-10-CM

## 2018-06-12 DIAGNOSIS — E11.8 CONTROLLED TYPE 2 DIABETES MELLITUS WITH COMPLICATION, WITHOUT LONG-TERM CURRENT USE OF INSULIN: Primary | Chronic | ICD-10-CM

## 2018-06-12 PROCEDURE — 99499 UNLISTED E&M SERVICE: CPT | Mod: S$PBB,,, | Performed by: FAMILY MEDICINE

## 2018-06-12 PROCEDURE — 3078F DIAST BP <80 MM HG: CPT | Mod: CPTII,S$GLB,, | Performed by: FAMILY MEDICINE

## 2018-06-12 PROCEDURE — 99999 PR PBB SHADOW E&M-EST. PATIENT-LVL V: CPT | Mod: PBBFAC,,, | Performed by: FAMILY MEDICINE

## 2018-06-12 PROCEDURE — 3044F HG A1C LEVEL LT 7.0%: CPT | Mod: CPTII,S$GLB,, | Performed by: FAMILY MEDICINE

## 2018-06-12 PROCEDURE — 99214 OFFICE O/P EST MOD 30 MIN: CPT | Mod: S$GLB,,, | Performed by: FAMILY MEDICINE

## 2018-06-12 PROCEDURE — 3074F SYST BP LT 130 MM HG: CPT | Mod: CPTII,S$GLB,, | Performed by: FAMILY MEDICINE

## 2018-06-12 NOTE — PROGRESS NOTES
Chief Complaint   Patient presents with    Hypertension     1 month follow up    Hip Pain     LT    Leg Swelling     Both       HPI  Regino Lawrence is a 72 y.o. female with multiple medical diagnoses as listed in the medical history and problem list that presents for follow-up for hypertension. We increased her amlodipine after taking her off of lisinopril after she had angioedema which resolved with increased prednisone. She has been seeing Rheumatology yesterday and is being placed on Methotrexate as she has been having a lot of joint pain and aching on prednisone.Today she is having pain in her left hip and a bruise on her left leg. She does not recall any trauma to the area.    PAST MEDICAL HISTORY:  Past Medical History:   Diagnosis Date    Acid reflux     Allergy     Alopecia     Anemia     Anxiety     Arthritis     Back pain     Cataract     Chronic kidney disease     Depression     Diabetes mellitus, type 2     Eye injury as a child     k-abrasion  od    Hyperlipidemia     Hypertension     Hypothyroidism     Immune deficiency disorder     Immune disorder     Myalgia and myositis 2012    Osteoporosis     Polyneuropathy     Renal manifestation of secondary diabetes mellitus     Sarcoidosis     Ulcer     no cancer    Urinary incontinence        PAST SURGICAL HISTORY:  Past Surgical History:   Procedure Laterality Date    CARPAL TUNNEL RELEASE      Rt wrist    CATARACT EXTRACTION W/  INTRAOCULAR LENS IMPLANT Right 2015    Dr. Azevedo    CATARACT EXTRACTION W/  INTRAOCULAR LENS IMPLANT Left 2015    Dr. Azevedo     SECTION      CHOLECYSTECTOMY      TUBAL LIGATION         SOCIAL HISTORY:  Social History     Social History    Marital status:      Spouse name: N/A    Number of children: N/A    Years of education: N/A     Occupational History    Not on file.     Social History Main Topics    Smoking status: Never Smoker    Smokeless tobacco:  Never Used    Alcohol use No    Drug use: No    Sexual activity: Yes     Partners: Male     Other Topics Concern    Not on file     Social History Narrative    No narrative on file       FAMILY HISTORY:  Family History   Problem Relation Age of Onset    Hypertension Mother     Cataracts Mother     No Known Problems Father     Hypertension Maternal Grandmother     Glaucoma Sister     Arthritis Sister     No Known Problems Brother     No Known Problems Maternal Aunt     No Known Problems Maternal Uncle     No Known Problems Paternal Aunt     No Known Problems Paternal Uncle     No Known Problems Maternal Grandfather     No Known Problems Paternal Grandmother     No Known Problems Paternal Grandfather     Kidney failure Sister     Hepatitis Sister     Cancer Sister         bone cancer     Immunodeficiency Sister     Lupus Neg Hx     Rheum arthritis Neg Hx     Amblyopia Neg Hx     Blindness Neg Hx     Diabetes Neg Hx     Macular degeneration Neg Hx     Retinal detachment Neg Hx     Strabismus Neg Hx     Stroke Neg Hx     Thyroid disease Neg Hx     Endometrial cancer Neg Hx     Vaginal cancer Neg Hx     Cervical cancer Neg Hx        ALLERGIES AND MEDICATIONS: updated and reviewed.  Review of patient's allergies indicates:   Allergen Reactions    Azathioprine Shortness Of Breath and Other (See Comments)     Fatigue     Current Outpatient Prescriptions   Medication Sig Dispense Refill    ACCU-CHEK FASTCLIX Kit USE AS DIRECTED. 1 each 0    ALCOHOL ANTISEPTIC PADS (ALCOHOL PREP PADS TOP)       alcohol antiseptic pads (ALCOHOL PREP PADS TOP)       blood sugar diagnostic Strp 1 each by Misc.(Non-Drug; Combo Route) route once daily. 100 strip 11    blood-glucose meter kit Use as instructed 1 each 0    calcium carbonate (OS-MALCOLM) 600 mg calcium (1,500 mg) Tab Take 1 tablet by mouth 2 (two) times daily.       carvedilol (COREG) 12.5 MG tablet Take 1 tablet (12.5 mg total) by mouth 2  (two) times daily. 180 tablet 1    cloNIDine (CATAPRES) 0.3 MG tablet Take 1 tablet (0.3 mg total) by mouth 3 (three) times daily. 270 tablet 1    cyclobenzaprine (FLEXERIL) 10 MG tablet Take 1 tablet (10 mg total) by mouth 3 (three) times daily as needed for Muscle spasms. 270 tablet 0    epoetin german (PROCRIT INJ) Inject 1,000 Units as directed.      fenofibrate 160 MG Tab Take 1 tablet (160 mg total) by mouth once daily. 90 tablet 1    fexofenadine (ALLEGRA ALLERGY) 180 MG tablet Take 180 mg by mouth daily as needed.       folic acid (FOLVITE) 1 MG tablet Take 1 tablet (1 mg total) by mouth once daily. 90 tablet 0    lancing device (ACCU-CHEK SOFTCLIX LANCET DEV) Misc Accu-chek FastClix kit 1 each 0    levothyroxine (SYNTHROID) 50 MCG tablet TAKE 1 TABLET (50 MCG TOTAL) BY MOUTH BEFORE BREAKFAST. 90 tablet 1    LUBRICANT EYE DROPS, GLYC-PG, 1-0.3 % Drop       metFORMIN (GLUCOPHAGE) 1000 MG tablet Take 1 tablet (1,000 mg total) by mouth 2 (two) times daily with meals. 180 tablet 1    methotrexate 2.5 MG Tab Take 4 tablets (10 mg total) by mouth every 7 days. 48 tablet 0    ondansetron (ZOFRAN) 8 MG tablet Take 1 tablet (8 mg total) by mouth every 8 (eight) hours as needed for Nausea. 25 tablet 1    potassium chloride SA (K-DUR,KLOR-CON) 20 MEQ tablet Take 1 tablet (20 mEq total) by mouth 2 (two) times daily. 180 tablet 1    predniSONE (DELTASONE) 10 MG tablet TAKE 1 TABLET ONCE DAILY 90 tablet 1    traMADol (ULTRAM) 50 mg tablet TAKE TWO TABLETS BY MOUTH EVERY 4 TO 6 HOURS AS NEEDED FOR PAIN 180 tablet 0    amLODIPine (NORVASC) 10 MG tablet Take 0.5 tablets (5 mg total) by mouth once daily. 90 tablet 1    gabapentin (NEURONTIN) 400 MG capsule Take 1 capsule (400 mg total) by mouth 2 (two) times daily. (Patient taking differently: Take 400 mg by mouth once daily. ) 180 capsule 1     No current facility-administered medications for this visit.        ROS  Review of Systems   Constitutional: Negative  for chills, diaphoresis, fatigue, fever and unexpected weight change.   HENT: Negative for rhinorrhea, sinus pressure, sore throat and tinnitus.    Eyes: Negative for photophobia and visual disturbance.   Respiratory: Negative for cough, shortness of breath and wheezing.    Cardiovascular: Negative for chest pain and palpitations.   Gastrointestinal: Negative for abdominal pain, blood in stool, constipation, diarrhea, nausea and vomiting.   Genitourinary: Negative for dysuria, flank pain, frequency and vaginal discharge.   Musculoskeletal: Positive for arthralgias. Negative for joint swelling.   Skin: Negative for rash.   Neurological: Negative for speech difficulty, weakness, light-headedness and headaches.   Psychiatric/Behavioral: Negative for behavioral problems and dysphoric mood.       Physical Exam  Vitals:    06/12/18 1104   BP: 124/70   BP Location: Left arm   Patient Position: Sitting   BP Method: Medium (Manual)   Pulse: 80   Temp: 97.9 °F (36.6 °C)   TempSrc: Oral   SpO2: 96%   Weight: 54.7 kg (120 lb 11.2 oz)   Height: 5' (1.524 m)    Body mass index is 23.57 kg/m².  Weight: 54.7 kg (120 lb 11.2 oz)   Height: 5' (152.4 cm)     Physical Exam   Constitutional: She is oriented to person, place, and time. She appears well-developed and well-nourished.   Eyes: EOM are normal.   Musculoskeletal:   Left hip with point tenderness over greater trochanter   Neurological: She is alert and oriented to person, place, and time.   Skin: Skin is warm and dry. No rash noted. No erythema.   Bruising present along left shin   Psychiatric: She has a normal mood and affect. Her behavior is normal.   Nursing note and vitals reviewed.      Health Maintenance       Date Due Completion Date    Lipid Panel 05/08/2018 5/8/2017    Foot Exam 05/12/2018 5/12/2017 (Done)    Override on 5/12/2017: Done    Mammogram 04/18/2018 4/18/2017    Urine Microalbumin 05/18/2018 5/18/2017    Eye Exam 06/29/2018 6/29/2017    Hemoglobin A1c  07/22/2018 1/22/2018    Influenza Vaccine 08/01/2018 9/27/2017 (Done)    Override on 9/27/2017: Done    DEXA SCAN 05/05/2020 5/5/2017    Colonoscopy 02/09/2025 2/9/2015 (Done)    Override on 2/9/2015: Done    Override on 2/18/2005: Done (reportedly normal)    TETANUS VACCINE 05/16/2026 5/16/2016            ASSESSMENT     1. Controlled type 2 diabetes mellitus with complication, without long-term current use of insulin    2. Essential hypertension    3. Sarcoidosis    4. Encounter for screening mammogram for breast cancer        PLAN:     Problem List Items Addressed This Visit        Cardiac/Vascular    Essential hypertension (Chronic)\  -blood pressure controlled on amlodipine 10mg, continue       Immunology/Multi System    Sarcoidosis (Chronic)  -pain is likely apart of her sarcoid, reviewed blood work with markers of inflammation which seem to be stable, recommend she rest and apply ice, she has pain medication she can take if needed       Endocrine    Diabetes mellitus type 2, controlled - Primary (Chronic)  -stable on current regimen  Lab Results   Component Value Date    HGBA1C 6.0 (H) 01/22/2018           Other Visit Diagnoses     Encounter for screening mammogram for breast cancer      -as ordered       Relevant Orders    Mammo Digital Screening Bilateral With CAD            Micaela Mendoza MD  06/12/2018 2:47 PM        Follow-up in about 3 months (around 9/12/2018) for Follow up.

## 2018-06-14 DIAGNOSIS — E11.9 TYPE 2 DIABETES MELLITUS WITHOUT COMPLICATION: ICD-10-CM

## 2018-06-15 ENCOUNTER — INFUSION (OUTPATIENT)
Dept: INFUSION THERAPY | Facility: HOSPITAL | Age: 73
End: 2018-06-15
Attending: FAMILY MEDICINE
Payer: MEDICARE

## 2018-06-15 VITALS
SYSTOLIC BLOOD PRESSURE: 147 MMHG | BODY MASS INDEX: 23.57 KG/M2 | WEIGHT: 120.69 LBS | DIASTOLIC BLOOD PRESSURE: 64 MMHG | TEMPERATURE: 98 F | HEART RATE: 98 BPM | OXYGEN SATURATION: 99 % | RESPIRATION RATE: 16 BRPM

## 2018-06-15 DIAGNOSIS — Z53.1 REFUSAL OF BLOOD TRANSFUSIONS AS PATIENT IS JEHOVAH'S WITNESS: ICD-10-CM

## 2018-06-15 DIAGNOSIS — N18.9 ANEMIA IN CHRONIC KIDNEY DISEASE, UNSPECIFIED CKD STAGE: Primary | ICD-10-CM

## 2018-06-15 DIAGNOSIS — D63.1 ANEMIA IN CHRONIC KIDNEY DISEASE, UNSPECIFIED CKD STAGE: Primary | ICD-10-CM

## 2018-06-15 PROCEDURE — 63600175 PHARM REV CODE 636 W HCPCS: Mod: JG | Performed by: INTERNAL MEDICINE

## 2018-06-15 PROCEDURE — 96372 THER/PROPH/DIAG INJ SC/IM: CPT

## 2018-06-15 RX ADMIN — ERYTHROPOIETIN 20000 UNITS: 20000 INJECTION, SOLUTION INTRAVENOUS; SUBCUTANEOUS at 11:06

## 2018-06-19 ENCOUNTER — OFFICE VISIT (OUTPATIENT)
Dept: PODIATRY | Facility: CLINIC | Age: 73
End: 2018-06-19
Payer: MEDICARE

## 2018-06-19 VITALS — BODY MASS INDEX: 23.56 KG/M2 | WEIGHT: 120 LBS | HEIGHT: 60 IN

## 2018-06-19 DIAGNOSIS — M51.36 DEGENERATIVE DISC DISEASE, LUMBAR: ICD-10-CM

## 2018-06-19 DIAGNOSIS — M20.42 HAMMER TOES OF BOTH FEET: ICD-10-CM

## 2018-06-19 DIAGNOSIS — E11.49 TYPE II DIABETES MELLITUS WITH NEUROLOGICAL MANIFESTATIONS: ICD-10-CM

## 2018-06-19 DIAGNOSIS — E11.9 COMPREHENSIVE DIABETIC FOOT EXAMINATION, TYPE 2 DM, ENCOUNTER FOR: Primary | ICD-10-CM

## 2018-06-19 DIAGNOSIS — M20.5X1 HALLUX LIMITUS, ACQUIRED, RIGHT: ICD-10-CM

## 2018-06-19 DIAGNOSIS — M20.41 HAMMER TOES OF BOTH FEET: ICD-10-CM

## 2018-06-19 DIAGNOSIS — R20.2 PARESTHESIAS: ICD-10-CM

## 2018-06-19 DIAGNOSIS — M20.5X2 HALLUX LIMITUS, ACQUIRED, LEFT: ICD-10-CM

## 2018-06-19 PROCEDURE — 99214 OFFICE O/P EST MOD 30 MIN: CPT | Mod: S$GLB,,, | Performed by: PODIATRIST

## 2018-06-19 PROCEDURE — 99999 PR PBB SHADOW E&M-EST. PATIENT-LVL III: CPT | Mod: PBBFAC,,, | Performed by: PODIATRIST

## 2018-06-19 PROCEDURE — 3044F HG A1C LEVEL LT 7.0%: CPT | Mod: CPTII,S$GLB,, | Performed by: PODIATRIST

## 2018-06-19 PROCEDURE — 99499 UNLISTED E&M SERVICE: CPT | Mod: S$PBB,,, | Performed by: PODIATRIST

## 2018-06-19 NOTE — PATIENT INSTRUCTIONS
"  Paraesthesias  Paraesthesia is a burning or prickling sensation that is sometimes felt in the hands, arms, legs or feet. It can also occur in other parts of the body. It can also feel like tingling or numbness, skin crawling, or itching. The feeling is not comfortable, but it is not painful. (The "pins and needles" feeling that happens when a foot or hand "falls asleep" is a temporary paraesthesia.)  Paraesthesias that last or come and go may be caused by medical issues that need to be treated. These include stroke, a bulging disk pressing on a nerve, a trapped nerve, vitamin deficiencies, or even certain medicines.  Tests are often done. These tests may include blood tests, X-ray, CT (computerized tomography) scan, or a muscle test (electromyography). Depending on the cause, treatment may include physical therapy.  Home care  · Tell the healthcare provider about all medicines you take. This includes prescription and over-the-counter medicines, vitamins, and herbs. Ask if any of the medicines may be causing your problems. Do not make any changes to prescription medicines without talking to your healthcare provider first.  · You may be prescribed medicines to help relieve the tingling feeling or for pain. Take all medicines as directed.  · A numb hand or foot may be more prone to injury. To help protect it:  ¨ Always use oven mitts.  ¨ Test water with an unaffected hand or foot.  ¨ Use caution when trimming nails. File sharp areas.  ¨ Wear shoes that fit well to avoid pressure points, blisters, and ulcers.  ¨ Inspect your hands and feet carefully (including the soles of your feet and between your toes) at least once a week. If you see red areas, sores, or other problems, tell your healthcare provider.  Follow-up care  Follow up with your doctor or as advised by our staff. You may need further testing or evaluation.  When to seek medical advice  Call your healthcare provider right away if any of the following " occur:  · Numbness or weakness of the face, one arm, or one leg  · Slurred speech, confusion, trouble speaking, walking, or seeing  · Severe headache, fainting spell, dizziness, or seizure  · Chest, arm, neck, or upper back pain  · Loss of bladder or bowel control  · Open wound with redness, swelling, or pus  Date Last Reviewed: 9/25/2015  © 5728-0867 [employer]. [address] [place] [address], [place] [address]. All rights reserved. This information is not intended as a substitute for professional medical care. Always follow your healthcare professional's instructions.    Recommend lotions: eucerin, eucerin for diabetics, aquaphor, A&D ointment, gold bond for diabetics, sween, Giovanni's Bees all purpose baby ointment,  urea 40 with aloe (found on amazon.com)    Shoe recommendations: (try 6pm[employer], zappos.[employer] , [employer], or shoes.[employer] for discounted prices) you can visit [employer] shoes in [place]  or [facility] in the [facility] (there are also several shoe brand outlets in the [facility])    Asics (GT 2000 or gel foundations), new balance stability type shoes, saucony (stabil c3),  [place] (GTS or Beast or transcend), vionic, propet (tennis shoe)    sofft brand, clarks, crocs, aerosoles, naturalizers, SAS, ecco, born, zoey emmanuel, rockports (dress shoes)    Vionic, burkenstocks, fitflops, propet (sandals)  Nike comfort thong sandals, crocs, propet (house shoes)    Nail Home remedy:  Vicks Vapor rub to nails for easier managability        Diabetes: Inspecting Your Feet  Diabetes increases your chances of developing foot problems. So inspect your feet every day. This helps you find small skin irritations before they become serious infections. If you have trouble seeing the bottoms of your feet, use a mirror or ask a family member or friend to help.     Pressure spots on the bottom of the foot are common areas where problems develop.   How to check your feet  Below are tips to help you look for foot problems. Try  to check your feet at the same time each day, such as when you get out of bed in the morning:  · Check the top of each foot. The tops of toes, back of the heel, and outer edge of the foot can get a lot of rubbing from poor-fitting shoes.  · Check the bottom of each foot. Daily wear and tear often leads to problems at pressure spots.  · Check the toes and nails. Fungal infections often occur between toes. Toenail problems can also be a sign of fungal infections or lead to breaks in the skin.  · Check your shoes, too. Loose objects inside a shoe can injure the foot. Use your hand to feel inside your shoes for things like bob, loose stitching, or rough areas that could irritate your skin.  Warning signs  Look for any color changes in the foot. Redness with streaks can signal a severe infection, which needs immediate medical attention. Tell your doctor right away if you have any of these problems:  · Swelling, sometimes with color changes, may be a sign of poor blood flow or infection. Symptoms include tenderness and an increase in the size of your foot.  · Warm or hot areas on your feet may be signs of infection. A foot that is cold may not be getting enough blood.  · Sensations such as burning, tingling, or pins and needles can be signs of a problem. Also check for areas that may be numb.  · Hot spots are caused by friction or pressure. Look for hot spots in areas that get a lot of rubbing. Hot spots can turn into blisters, calluses, or sores.  · Cracks and sores are caused by dry or irritated skin. They are a sign that the skin is breaking down, which can lead to infection.  · Toenail problems to watch for include nails growing into the skin (ingrown toenail) and causing redness or pain. Thick, yellow, or discolored nails can signal a fungal infection.  · Drainage and odor can develop from untreated sores and ulcers. Call your doctor right away if you notice white or yellow drainage, bleeding, or unpleasant  odor.   © 1812-6397 [employer]. [address] [place] [address]. All rights reserved. This information is not intended as a substitute for professional medical care. Always follow your healthcare professional's instructions.        Step-by-Step:  Inspecting Your Feet (Diabetes)    Date Last Reviewed: 10/1/2016  © 4241-3787 [employer]. [address] [place] [address]. All rights reserved. This information is not intended as a substitute for professional medical care. Always follow your healthcare professional's instructions.

## 2018-06-19 NOTE — PROGRESS NOTES
"Subjective:      Patient ID: Regino Lawrence is a 72 y.o. female.    Chief Complaint: Diabetes Mellitus (5/18/18 Dr. Mendoza); Diabetic Foot Exam; and Nail Care    Regino is a 72 y.o. female who presents to the clinic for evaluation and treatment of high risk feet. Regino has a past medical history of Acid reflux; Allergy; Alopecia; Anemia; Anxiety; Arthritis; Back pain; Cataract; Chronic kidney disease; Depression; Diabetes mellitus, type 2; Eye injury (as a child ); Hyperlipidemia; Hypertension; Hypothyroidism; Immune deficiency disorder; Immune disorder; Myalgia and myositis (9/6/2012); Osteoporosis; Polyneuropathy; Renal manifestation of secondary diabetes mellitus; Sarcoidosis; Ulcer; and Urinary incontinence. The patient's chief complaint is abnormal sensation to the feet. No inciting nor alleviating factors.  She relates that she often feels as if her "body is pulling to the right" and causes her to feel off balance.  Patient has significant issues with her neck and lower back.  She is not under that care of a neurologist and relates that she was given an rx for gabapentin 400 mg BID but only takes them on occasion, she relates some relief with medication. This patient has documented high risk feet requiring routine maintenance secondary to diabetes mellitis and those secondary complications of diabetes, as mentioned..    PCP: Micaela Mendoza MD    Date Last Seen by PCP:   Chief Complaint   Patient presents with    Diabetes Mellitus     5/18/18 Dr. Mendoza    Diabetic Foot Exam    Nail Care     Current shoe gear:  Clogs     Hemoglobin A1C   Date Value Ref Range Status   01/22/2018 6.0 (H) 4.0 - 5.6 % Final     Comment:     According to ADA guidelines, hemoglobin A1c <7.0% represents  optimal control in non-pregnant diabetic patients. Different  metrics may apply to specific patient populations.   Standards of Medical Care in Diabetes-2016.  For the purpose of screening for the presence of diabetes:  <5.7%     " Consistent with the absence of diabetes  5.7-6.4%  Consistent with increasing risk for diabetes   (prediabetes)  >or=6.5%  Consistent with diabetes  Currently, no consensus exists for use of hemoglobin A1c  for diagnosis of diabetes for children.  This Hemoglobin A1c assay has significant interference with fetal   hemoglobin   (HbF). The results are invalid for patients with abnormal amounts of   HbF,   including those with known Hereditary Persistence   of Fetal Hemoglobin. Heterozygous hemoglobin variants (HbAS, HbAC,   HbAD, HbAE, HbA2) do not significantly interfere with this assay;   however, presence of multiple variants in a sample may impact the %   interference.     06/30/2017 5.9 (H) 4.0 - 5.6 % Final     Comment:     According to ADA guidelines, hemoglobin A1c <7.0% represents  optimal control in non-pregnant diabetic patients. Different  metrics may apply to specific patient populations.   Standards of Medical Care in Diabetes-2016.  For the purpose of screening for the presence of diabetes:  <5.7%     Consistent with the absence of diabetes  5.7-6.4%  Consistent with increasing risk for diabetes   (prediabetes)  >or=6.5%  Consistent with diabetes  Currently, no consensus exists for use of hemoglobin A1c  for diagnosis of diabetes for children.  This Hemoglobin A1c assay has significant interference with fetal   hemoglobin   (HbF). The results are invalid for patients with abnormal amounts of   HbF,   including those with known Hereditary Persistence   of Fetal Hemoglobin. Heterozygous hemoglobin variants (HbAS, HbAC,   HbAD, HbAE, HbA2) do not significantly interfere with this assay;   however, presence of multiple variants in a sample may impact the %   interference.     11/29/2016 5.6 4.5 - 6.2 % Final     Comment:     According to ADA guidelines, hemoglobin A1C <7.0% represents  optimal control in non-pregnant diabetic patients.  Different  metrics may apply to specific populations.   Standards of  Medical Care in Diabetes - 2016.  For the purpose of screening for the presence of diabetes:  <5.7%     Consistent with the absence of diabetes  5.7-6.4%  Consistent with increasing risk for diabetes   (prediabetes)  >or=6.5%  Consistent with diabetes  Currently no consensus exists for use of hemoglobin A1C  for diagnosis of diabetes for children.       Patient Active Problem List   Diagnosis    Fatigue    Diabetes mellitus type 2, controlled    Hypothyroidism    Combined 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    Essential hypertension    Polyneuropathy    Bilateral thoracic back pain    Bilateral carpal tunnel syndrome    Diabetes mellitus with stage 3 chronic kidney disease    Controlled type 2 diabetes mellitus with microalbuminuria or microproteinuria    Right foot pain    Sarcoidosis    Corneal scar, right eye    Nuclear sclerosis    Senile nuclear sclerosis    Immunosuppression    Vitamin B12 deficiency anemia    Left shoulder pain    Cervical spinal stenosis    Patient is Confucianism    GERD (gastroesophageal reflux disease)    Debility    S/P cervical spinal fusion    Bilateral low back pain without sciatica    Degenerative disc disease, lumbar    Poor motor control of trunk    Impaired mobility    Weakness of both hips    Iron deficiency anemia    Anemia in CKD (chronic kidney disease)    Refusal of blood transfusions as patient is Confucianism    Current use of steroid medication    DM type 2 without retinopathy    Pseudophakia    Insufficiency of tear film of both eyes    Refractive error    Myopathy    Osteopenia    Calcification of aorta     Current Outpatient Prescriptions on File Prior to Visit   Medication Sig Dispense Refill    ACCU-CHEK FASTCLIX Kit USE AS DIRECTED. 1 each 0    ALCOHOL ANTISEPTIC PADS (ALCOHOL PREP PADS TOP)       alcohol antiseptic pads (ALCOHOL PREP PADS TOP)       blood sugar diagnostic Strp 1 each by  Misc.(Non-Drug; Combo Route) route once daily. 100 strip 11    blood-glucose meter kit Use as instructed 1 each 0    calcium carbonate (OS-MALCOLM) 600 mg calcium (1,500 mg) Tab Take 1 tablet by mouth 2 (two) times daily.       carvedilol (COREG) 12.5 MG tablet Take 1 tablet (12.5 mg total) by mouth 2 (two) times daily. 180 tablet 1    cloNIDine (CATAPRES) 0.3 MG tablet Take 1 tablet (0.3 mg total) by mouth 3 (three) times daily. 270 tablet 1    cyclobenzaprine (FLEXERIL) 10 MG tablet Take 1 tablet (10 mg total) by mouth 3 (three) times daily as needed for Muscle spasms. 270 tablet 0    epoetin german (PROCRIT INJ) Inject 1,000 Units as directed.      fenofibrate 160 MG Tab Take 1 tablet (160 mg total) by mouth once daily. 90 tablet 1    fexofenadine (ALLEGRA ALLERGY) 180 MG tablet Take 180 mg by mouth daily as needed.       folic acid (FOLVITE) 1 MG tablet Take 1 tablet (1 mg total) by mouth once daily. 90 tablet 0    lancing device (ACCU-CHEK SOFTCLIX LANCET DEV) Misc Accu-chek FastClix kit 1 each 0    levothyroxine (SYNTHROID) 50 MCG tablet TAKE 1 TABLET (50 MCG TOTAL) BY MOUTH BEFORE BREAKFAST. 90 tablet 1    LUBRICANT EYE DROPS, GLYC-PG, 1-0.3 % Drop       metFORMIN (GLUCOPHAGE) 1000 MG tablet Take 1 tablet (1,000 mg total) by mouth 2 (two) times daily with meals. 180 tablet 1    methotrexate 2.5 MG Tab Take 4 tablets (10 mg total) by mouth every 7 days. 48 tablet 0    ondansetron (ZOFRAN) 8 MG tablet Take 1 tablet (8 mg total) by mouth every 8 (eight) hours as needed for Nausea. 25 tablet 1    potassium chloride SA (K-DUR,KLOR-CON) 20 MEQ tablet Take 1 tablet (20 mEq total) by mouth 2 (two) times daily. 180 tablet 1    predniSONE (DELTASONE) 10 MG tablet TAKE 1 TABLET ONCE DAILY 90 tablet 1    traMADol (ULTRAM) 50 mg tablet TAKE TWO TABLETS BY MOUTH EVERY 4 TO 6 HOURS AS NEEDED FOR PAIN 180 tablet 0    amLODIPine (NORVASC) 10 MG tablet Take 0.5 tablets (5 mg total) by mouth once daily. 90 tablet 1     gabapentin (NEURONTIN) 400 MG capsule Take 1 capsule (400 mg total) by mouth 2 (two) times daily. (Patient taking differently: Take 400 mg by mouth once daily. ) 180 capsule 1     No current facility-administered medications on file prior to visit.      Review of patient's allergies indicates:   Allergen Reactions    Azathioprine Shortness Of Breath and Other (See Comments)     Fatigue     Past Surgical History:   Procedure Laterality Date    CARPAL TUNNEL RELEASE      Rt wrist    CATARACT EXTRACTION W/  INTRAOCULAR LENS IMPLANT Right 2015    Dr. Azevedo    CATARACT EXTRACTION W/  INTRAOCULAR LENS IMPLANT Left 2015    Dr. Azevedo     SECTION      CHOLECYSTECTOMY      TUBAL LIGATION       Family History   Problem Relation Age of Onset    Hypertension Mother     Cataracts Mother     No Known Problems Father     Hypertension Maternal Grandmother     Glaucoma Sister     Arthritis Sister     No Known Problems Brother     No Known Problems Maternal Aunt     No Known Problems Maternal Uncle     No Known Problems Paternal Aunt     No Known Problems Paternal Uncle     No Known Problems Maternal Grandfather     No Known Problems Paternal Grandmother     No Known Problems Paternal Grandfather     Kidney failure Sister     Hepatitis Sister     Cancer Sister         bone cancer     Immunodeficiency Sister     Lupus Neg Hx     Rheum arthritis Neg Hx     Amblyopia Neg Hx     Blindness Neg Hx     Diabetes Neg Hx     Macular degeneration Neg Hx     Retinal detachment Neg Hx     Strabismus Neg Hx     Stroke Neg Hx     Thyroid disease Neg Hx     Endometrial cancer Neg Hx     Vaginal cancer Neg Hx     Cervical cancer Neg Hx      Social History     Social History    Marital status:      Spouse name: N/A    Number of children: N/A    Years of education: N/A     Occupational History    Not on file.     Social History Main Topics    Smoking status: Never Smoker     Smokeless tobacco: Never Used    Alcohol use No    Drug use: No    Sexual activity: Yes     Partners: Male     Other Topics Concern    Not on file     Social History Narrative    No narrative on file       Review of Systems   Constitution: Positive for weakness. Negative for chills and fever.        She feels tired and weak   Cardiovascular: Negative for chest pain, claudication and leg swelling.   Respiratory: Negative for cough and shortness of breath.    Skin: Positive for dry skin and nail changes. Negative for itching and rash.   Musculoskeletal: Positive for arthritis, back pain, joint pain, muscle weakness, myalgias and neck pain. Negative for falls and joint swelling.   Gastrointestinal: Positive for nausea. Negative for diarrhea and vomiting.   Neurological: Positive for loss of balance, numbness, paresthesias and sensory change. Negative for tremors.   Psychiatric/Behavioral: Negative for altered mental status and hallucinations.           Objective:       Vitals:    06/19/18 1025   Weight: 54.4 kg (120 lb)   Height: 5' (1.524 m)   PainSc: 0-No pain       Physical Exam   Constitutional:   General: Pt. is well-developed, well-nourished, appears stated age, in no acute distress, alert and oriented x 3. No evidence of depression, anxiety, or agitation. Calm, cooperative, and communicative. Appropriate interactions and affect.       Cardiovascular:   Pulses:       Dorsalis pedis pulses are 1+ on the right side, and 1+ on the left side.        Posterior tibial pulses are 1+ on the right side, and 1+ on the left side.   There is decreased digital hair. Skin is atrophic, hyperpigmented, and mildly edematous       Musculoskeletal:        Right ankle: She exhibits swelling. No tenderness. Achilles tendon exhibits no pain, no defect and normal Naik's test results.        Left ankle: She exhibits swelling. No tenderness. Achilles tendon exhibits no pain and no defect.        Right foot: There is  tenderness. There is normal range of motion.        Left foot: There is tenderness. There is normal range of motion.   5/5 muscle strength Bilaterally. There is some limitation of dorsiflexion with knees extended Bilaterally, adequate with knees flexed.     Decreased stride, station of gait.  apropulsive toe off.  Increased angle and base of gait.    Patient has hammertoes of digits 2-5 bilateral partially reducible without symptom today.    Tailor bunion present 5th mtpj right with medial deviation of 5th toe, prominent bony bump lateral 5th mtpj, and pain to palpation without evidence of trauma or infection.   Neurological: A sensory deficit is present.   Taylor-Ingrid 5.07 monofilamant testing is diminished Ted feet. Sharp/dull sensation diminished Bilaterally.   Skin: Skin is warm, dry and intact. No abrasion, no ecchymosis, no lesion and no rash noted. She is not diaphoretic. No cyanosis or erythema. No pallor. Nails show no clubbing.   Toenails 1-5 bilaterally are thickened by 2-3 mm, discolored/yellowed, dystrophic, brittle with subungual debris.    Interdigital Spaces clean, dry and without evidence of break in skin integrity   Psychiatric: She has a normal mood and affect. Her speech is normal.   Nursing note and vitals reviewed.        Assessment:       Encounter Diagnoses   Name Primary?    Comprehensive diabetic foot examination, type 2 DM, encounter for Yes    Degenerative disc disease, lumbar     Type II diabetes mellitus with neurological manifestations     Paresthesias     Hammer toes of both feet     Hallux limitus, acquired, left     Hallux limitus, acquired, right          Plan:       Regino was seen today for diabetes mellitus, diabetic foot exam and nail care.    Diagnoses and all orders for this visit:    Comprehensive diabetic foot examination, type 2 DM, encounter for    Degenerative disc disease, lumbar  -     Ambulatory consult to Neurology    Type II diabetes mellitus with  neurological manifestations  -     Ambulatory consult to Neurology    Paresthesias  -     Ambulatory consult to Neurology    Hammer toes of both feet    Hallux limitus, acquired, left    Hallux limitus, acquired, right      I counseled the patient on her conditions, their implications and medical management.      Greater than 50% of this visit spent on counseling and coordination of care.    Greater than 20 minutes spent on education about the diabetic foot, neuropathy, and prevention of limb loss.    Patient paresthesia is likely secondary to LB pathology.  Referral to neurology for paresthesia and loss of balance.    Shoe inspection. Diabetic Foot Education. Patient reminded of the importance of good nutrition/healthy diet/weight management and blood sugar control to help prevent podiatric complications of diabetes. Patient instructed on proper foot hygeine. Wear comfortable, proper fitting shoes. Wash feet daily. Dry well. After drying, apply moisturizer to feet (no lotion to webspaces). Inspect feet daily for skin breaks, blisters, swelling, or redness. Wear cotton socks (preferably white)  Change socks every day. Do NOT walk barefoot. Do NOT use heating pads or hot water soaks. We discussed wearing proper shoe gear, daily foot inspections, never walking without protective shoe gear.     Discussed importance of daily moisturizer to the feet such as Gold bonds diabetic foot cream    She will continue to monitor the areas daily, inspect her feet, wear protective shoe gear when ambulatory, moisturizer to maintain skin integrity and follow in this office in approximately 12 months, sooner p.r.n.

## 2018-06-19 NOTE — LETTER
June 19, 2018      Micaela Mendoza MD  [address]  [place] [address]           [facility]  [address] [facility]  [place] [address]  Phone: [phone]          Patient: Regino Lawrence   MR Number: [id number]   YOB: 1945   Date of Visit: 6/19/2018       Dear Dr. Micaela Mendoza:    Thank you for referring Regino Lawrence to me for evaluation. Attached you will find relevant portions of my assessment and plan of care.    If you have questions, please do not hesitate to call me. I look forward to following Regino Lawrence along with you.    Sincerely,    Joanie Belcher DPM    Enclosure  CC:  No Recipients    If you would like to receive this communication electronically, please contact [email or web address] or [phone] to request more information on [employer] access.    For providers and/or their staff who would like to refer a patient to [facility], please contact us through our one-stop-shop provider referral line, [facility] , at [phone].    If you feel you have received this communication in error or would no longer like to receive these types of communications, please e-mail [email or web address]

## 2018-06-20 ENCOUNTER — TELEPHONE (OUTPATIENT)
Dept: NEUROSURGERY | Facility: CLINIC | Age: 73
End: 2018-06-20

## 2018-06-20 ENCOUNTER — PATIENT MESSAGE (OUTPATIENT)
Dept: RHEUMATOLOGY | Facility: CLINIC | Age: 73
End: 2018-06-20

## 2018-06-20 DIAGNOSIS — M54.2 NECK PAIN: Primary | ICD-10-CM

## 2018-06-20 DIAGNOSIS — M54.9 BACK PAIN, UNSPECIFIED BACK LOCATION, UNSPECIFIED BACK PAIN LATERALITY, UNSPECIFIED CHRONICITY: ICD-10-CM

## 2018-06-20 DIAGNOSIS — R29.898 LEG WEAKNESS, BILATERAL: ICD-10-CM

## 2018-06-26 ENCOUNTER — HOSPITAL ENCOUNTER (OUTPATIENT)
Dept: RADIOLOGY | Facility: HOSPITAL | Age: 73
Discharge: HOME | End: 2018-06-26
Attending: FAMILY MEDICINE
Payer: MEDICARE

## 2018-06-26 DIAGNOSIS — Z12.31 ENCOUNTER FOR SCREENING MAMMOGRAM FOR BREAST CANCER: ICD-10-CM

## 2018-06-26 PROCEDURE — 77067 SCR MAMMO BI INCL CAD: CPT | Mod: 26,,, | Performed by: RADIOLOGY

## 2018-06-26 PROCEDURE — 77067 SCR MAMMO BI INCL CAD: CPT | Mod: TC

## 2018-06-29 ENCOUNTER — LAB VISIT (OUTPATIENT)
Dept: LAB | Facility: HOSPITAL | Age: 73
End: 2018-06-29
Attending: INTERNAL MEDICINE
Payer: MEDICARE

## 2018-06-29 DIAGNOSIS — N18.9 ANEMIA IN CHRONIC KIDNEY DISEASE, UNSPECIFIED CKD STAGE: ICD-10-CM

## 2018-06-29 DIAGNOSIS — D63.1 ANEMIA IN CHRONIC KIDNEY DISEASE, UNSPECIFIED CKD STAGE: ICD-10-CM

## 2018-06-29 LAB
BASOPHILS # BLD AUTO: 0.02 K/UL
BASOPHILS NFR BLD: 0.4 %
DIFFERENTIAL METHOD: ABNORMAL
EOSINOPHIL # BLD AUTO: 0.1 K/UL
EOSINOPHIL NFR BLD: 1.9 %
ERYTHROCYTE [DISTWIDTH] IN BLOOD BY AUTOMATED COUNT: 13.3 %
HCT VFR BLD AUTO: 34.5 %
HGB BLD-MCNC: 11.2 G/DL
LYMPHOCYTES # BLD AUTO: 1.8 K/UL
LYMPHOCYTES NFR BLD: 31.6 %
MCH RBC QN AUTO: 31.5 PG
MCHC RBC AUTO-ENTMCNC: 32.5 G/DL
MCV RBC AUTO: 97 FL
MONOCYTES # BLD AUTO: 0.4 K/UL
MONOCYTES NFR BLD: 6.4 %
NEUTROPHILS # BLD AUTO: 3.3 K/UL
NEUTROPHILS NFR BLD: 59 %
PLATELET # BLD AUTO: 352 K/UL
PMV BLD AUTO: 8.3 FL
RBC # BLD AUTO: 3.55 M/UL
WBC # BLD AUTO: 5.66 K/UL

## 2018-06-29 PROCEDURE — 36415 COLL VENOUS BLD VENIPUNCTURE: CPT

## 2018-06-29 PROCEDURE — 85025 COMPLETE CBC W/AUTO DIFF WBC: CPT

## 2018-07-05 ENCOUNTER — OFFICE VISIT (OUTPATIENT)
Dept: FAMILY MEDICINE | Facility: CLINIC | Age: 73
End: 2018-07-05
Payer: MEDICARE

## 2018-07-05 VITALS
DIASTOLIC BLOOD PRESSURE: 70 MMHG | SYSTOLIC BLOOD PRESSURE: 126 MMHG | HEIGHT: 60 IN | OXYGEN SATURATION: 96 % | HEART RATE: 92 BPM | BODY MASS INDEX: 23.77 KG/M2 | WEIGHT: 121.06 LBS

## 2018-07-05 DIAGNOSIS — E11.69 COMBINED HYPERLIPIDEMIA ASSOCIATED WITH TYPE 2 DIABETES MELLITUS: Chronic | ICD-10-CM

## 2018-07-05 DIAGNOSIS — E11.9 DM TYPE 2 WITHOUT RETINOPATHY: ICD-10-CM

## 2018-07-05 DIAGNOSIS — D86.9 SARCOIDOSIS: Chronic | ICD-10-CM

## 2018-07-05 DIAGNOSIS — E11.8 CONTROLLED TYPE 2 DIABETES MELLITUS WITH COMPLICATION, WITHOUT LONG-TERM CURRENT USE OF INSULIN: Chronic | ICD-10-CM

## 2018-07-05 DIAGNOSIS — N18.30 DIABETES MELLITUS WITH STAGE 3 CHRONIC KIDNEY DISEASE: Chronic | ICD-10-CM

## 2018-07-05 DIAGNOSIS — D84.9 IMMUNOSUPPRESSION: Chronic | ICD-10-CM

## 2018-07-05 DIAGNOSIS — Z00.00 ENCOUNTER FOR PREVENTIVE HEALTH EXAMINATION: Primary | ICD-10-CM

## 2018-07-05 DIAGNOSIS — I70.0 CALCIFICATION OF AORTA: ICD-10-CM

## 2018-07-05 DIAGNOSIS — E78.2 COMBINED HYPERLIPIDEMIA ASSOCIATED WITH TYPE 2 DIABETES MELLITUS: Chronic | ICD-10-CM

## 2018-07-05 DIAGNOSIS — E11.22 DIABETES MELLITUS WITH STAGE 3 CHRONIC KIDNEY DISEASE: Chronic | ICD-10-CM

## 2018-07-05 DIAGNOSIS — I10 ESSENTIAL HYPERTENSION: Chronic | ICD-10-CM

## 2018-07-05 PROCEDURE — G0439 PPPS, SUBSEQ VISIT: HCPCS | Mod: S$GLB,,, | Performed by: NURSE PRACTITIONER

## 2018-07-05 PROCEDURE — 3044F HG A1C LEVEL LT 7.0%: CPT | Mod: CPTII,S$GLB,, | Performed by: NURSE PRACTITIONER

## 2018-07-05 PROCEDURE — 3078F DIAST BP <80 MM HG: CPT | Mod: CPTII,S$GLB,, | Performed by: NURSE PRACTITIONER

## 2018-07-05 PROCEDURE — 3074F SYST BP LT 130 MM HG: CPT | Mod: CPTII,S$GLB,, | Performed by: NURSE PRACTITIONER

## 2018-07-05 PROCEDURE — 99999 PR PBB SHADOW E&M-EST. PATIENT-LVL V: CPT | Mod: PBBFAC,,, | Performed by: NURSE PRACTITIONER

## 2018-07-05 PROCEDURE — 99499 UNLISTED E&M SERVICE: CPT | Mod: HCNC,S$GLB,, | Performed by: NURSE PRACTITIONER

## 2018-07-05 NOTE — PATIENT INSTRUCTIONS
Counseling and Referral of Other Preventative  (Italic type indicates deductible and co-insurance are waived)    Patient Name: Regino Lawrence  Today's Date: 7/5/2018    Health Maintenance       Date Due Completion Date    Lipid Panel 05/08/2018 5/8/2017, Patient aware of recommendation for lipid panel.     Eye Exam 06/29/2018 6/29/2017, Patient aware of recommendation for updated eye exam.     Urine Microalbumin 05/18/2018 5/18/2017    Influenza Vaccine 08/01/2018 9/27/2017 (Done)    Override on 9/27/2017: Done    Hemoglobin A1c 10/19/2018 4/19/2018    Foot Exam 06/19/2019 6/19/2018 (Done)    Override on 6/19/2018: Done    Override on 5/12/2017: Done    Mammogram 06/26/2019 6/26/2018    DEXA SCAN 05/05/2020 5/5/2017    Colonoscopy 02/09/2025 2/9/2015 (Done)    Override on 2/9/2015: Done    Override on 2/18/2005: Done (reportedly normal)    TETANUS VACCINE 05/16/2026 5/16/2016        No orders of the defined types were placed in this encounter.    The following information is provided to all patients.  This information is to help you find resources for any of the problems found today that may be affecting your health:                Living healthy guide: www.[facility].[place].gov      Understanding Diabetes: www.diabetes.org      Eating healthy: www.cdc.gov/healthyweight      CDC home safety checklist: www.cdc.gov/steadi/patient.html      Agency on Aging: www.goea.[place].gov      Alcoholics anonymous (AA): www.aa.org      Physical Activity: www.himanshu.nih.gov/ks3qgpi      Tobacco use: www.quitwithusla.org

## 2018-07-05 NOTE — PROGRESS NOTES
Regino Lawrence presented for a  Medicare AWV and comprehensive Health Risk Assessment today. The following components were reviewed and updated:    · Medical history  · Family History  · Social history  · Allergies and Current Medications  · Health Risk Assessment  · Health Maintenance  · Care Team         ** See Completed Assessments for Annual Wellness Visit within the encounter summary.**       The following assessments were completed:  · Living Situation  · CAGE  · Depression Screening  · Timed Get Up and Go  · Whisper Test  · Cognitive Function Screening  · Nutrition Screening  · ADL Screening  · PAQ Screening    Vitals:    07/05/18 1021   BP: 126/70   BP Location: Left arm   Patient Position: Sitting   BP Method: Large (Manual)   Pulse: 92   SpO2: 96%   Weight: 54.9 kg (121 lb 0.5 oz)   Height: 5' (1.524 m)     Body mass index is 23.64 kg/m².  Physical Exam   Constitutional: She appears well-nourished.   Cardiovascular: Normal rate.    Pulmonary/Chest: Effort normal.   Neurological: She is alert.   Skin: Skin is warm.   Psychiatric: She has a normal mood and affect. Her behavior is normal. Thought content normal.   Vitals reviewed.        Diagnoses and health risks identified today and associated recommendations/orders:    1. Encounter for preventive health examination  Education provided about preventive health examinations and procedures; addressed and discussed patient's health concerns. Additionally, reviewed medical record for risk factors and documented the results during this encounter.    2. Controlled type 2 diabetes mellitus with complication, without long-term current use of insulin  Stable and controlled. Continue current treatment plan as previously prescribed with your PCP.     3. Calcification of aorta  Stable, asymptomatic; monitor.     4. Diabetes mellitus with stage 3 chronic kidney disease  Stable and controlled. Continue current treatment plan as previously prescribed with your PCP.     5.  Immunosuppression  Stable and controlled. Continue current treatment plan as previously prescribed with your specialist.     6. Combined 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  Stable and controlled. Continue current treatment plan as previously prescribed with your PCP.     7. DM type 2 without retinopathy  Stable, patient evaluated/monitored by [facility] ophthalmology dept; continue as advised.     8. Sarcoidosis  Stable and controlled. Continue current treatment plan as previously prescribed with your specialist.     9. Essential hypertension  Presently at goal. Continue as advised regarding dietary and lifestyle modifications.     Reviewed health maintenance with patient, educated about recommended examinations, procedures (labs & images), and immunizations. Patient aware of recommendation for updated lipid panel and eye exam.  She reports upcoming appointment for labs and eye provider.       Provided Aranamha with a 5-10 year written screening schedule and personal prevention plan. Recommendations were developed using the USPSTF age appropriate recommendations. Education, counseling, and referrals were provided as needed. After Visit Summary printed and given to patient which includes a list of additional screenings\tests needed.    Follow-up in about 1 year (around 7/5/2019) for assessment .    Keshawn Everett Jr, NP

## 2018-07-13 ENCOUNTER — INFUSION (OUTPATIENT)
Dept: INFUSION THERAPY | Facility: HOSPITAL | Age: 73
End: 2018-07-13
Attending: PODIATRIST
Payer: MEDICARE

## 2018-07-13 ENCOUNTER — LAB VISIT (OUTPATIENT)
Dept: LAB | Facility: HOSPITAL | Age: 73
End: 2018-07-13
Attending: INTERNAL MEDICINE
Payer: MEDICARE

## 2018-07-13 VITALS
SYSTOLIC BLOOD PRESSURE: 123 MMHG | DIASTOLIC BLOOD PRESSURE: 72 MMHG | WEIGHT: 121.06 LBS | HEART RATE: 92 BPM | TEMPERATURE: 98 F | RESPIRATION RATE: 16 BRPM | OXYGEN SATURATION: 99 % | BODY MASS INDEX: 23.64 KG/M2

## 2018-07-13 DIAGNOSIS — N18.9 ANEMIA IN CHRONIC KIDNEY DISEASE, UNSPECIFIED CKD STAGE: ICD-10-CM

## 2018-07-13 DIAGNOSIS — D63.1 ANEMIA IN CHRONIC KIDNEY DISEASE, UNSPECIFIED CKD STAGE: ICD-10-CM

## 2018-07-13 DIAGNOSIS — Z53.1 REFUSAL OF BLOOD TRANSFUSIONS AS PATIENT IS JEHOVAH'S WITNESS: ICD-10-CM

## 2018-07-13 DIAGNOSIS — N18.9 ANEMIA IN CHRONIC KIDNEY DISEASE, UNSPECIFIED CKD STAGE: Primary | ICD-10-CM

## 2018-07-13 DIAGNOSIS — D63.1 ANEMIA IN CHRONIC KIDNEY DISEASE, UNSPECIFIED CKD STAGE: Primary | ICD-10-CM

## 2018-07-13 LAB
BASOPHILS # BLD AUTO: 0 K/UL
BASOPHILS NFR BLD: 0 %
DIFFERENTIAL METHOD: ABNORMAL
EOSINOPHIL # BLD AUTO: 0 K/UL
EOSINOPHIL NFR BLD: 0 %
ERYTHROCYTE [DISTWIDTH] IN BLOOD BY AUTOMATED COUNT: 13 %
HCT VFR BLD AUTO: 30 %
HGB BLD-MCNC: 10.1 G/DL
LYMPHOCYTES # BLD AUTO: 0.7 K/UL
LYMPHOCYTES NFR BLD: 14.2 %
MCH RBC QN AUTO: 32.4 PG
MCHC RBC AUTO-ENTMCNC: 33.7 G/DL
MCV RBC AUTO: 96 FL
MONOCYTES # BLD AUTO: 0.2 K/UL
MONOCYTES NFR BLD: 3.1 %
NEUTROPHILS # BLD AUTO: 4.2 K/UL
NEUTROPHILS NFR BLD: 82.3 %
PLATELET # BLD AUTO: 323 K/UL
PMV BLD AUTO: 8.9 FL
RBC # BLD AUTO: 3.12 M/UL
WBC # BLD AUTO: 5.15 K/UL

## 2018-07-13 PROCEDURE — 96372 THER/PROPH/DIAG INJ SC/IM: CPT

## 2018-07-13 PROCEDURE — 85025 COMPLETE CBC W/AUTO DIFF WBC: CPT

## 2018-07-13 PROCEDURE — 63600175 PHARM REV CODE 636 W HCPCS: Mod: JG | Performed by: INTERNAL MEDICINE

## 2018-07-13 PROCEDURE — 36415 COLL VENOUS BLD VENIPUNCTURE: CPT

## 2018-07-13 RX ADMIN — ERYTHROPOIETIN 20000 UNITS: 20000 INJECTION, SOLUTION INTRAVENOUS; SUBCUTANEOUS at 11:07

## 2018-07-13 NOTE — PLAN OF CARE
Problem: Patient Care Overview (Adult)  Goal: Plan of Care Review  Outcome: Ongoing (interventions implemented as appropriate)  Tolerated Procrit.

## 2018-07-16 ENCOUNTER — LAB VISIT (OUTPATIENT)
Dept: LAB | Facility: HOSPITAL | Age: 73
End: 2018-07-16
Attending: INTERNAL MEDICINE
Payer: MEDICARE

## 2018-07-16 DIAGNOSIS — D86.9 SARCOIDOSIS: ICD-10-CM

## 2018-07-16 DIAGNOSIS — M79.10 MYALGIA: ICD-10-CM

## 2018-07-16 LAB
ALBUMIN SERPL BCP-MCNC: 3.5 G/DL
ALP SERPL-CCNC: 53 U/L
ALT SERPL W/O P-5'-P-CCNC: 13 U/L
ANION GAP SERPL CALC-SCNC: 8 MMOL/L
AST SERPL-CCNC: 17 U/L
BASOPHILS # BLD AUTO: 0.04 K/UL
BASOPHILS NFR BLD: 0.8 %
BILIRUB SERPL-MCNC: 0.4 MG/DL
BUN SERPL-MCNC: 13 MG/DL
CALCIUM SERPL-MCNC: 9.3 MG/DL
CHLORIDE SERPL-SCNC: 102 MMOL/L
CK SERPL-CCNC: 136 U/L
CO2 SERPL-SCNC: 29 MMOL/L
CREAT SERPL-MCNC: 1.1 MG/DL
CRP SERPL-MCNC: 5.3 MG/L
DIFFERENTIAL METHOD: ABNORMAL
EOSINOPHIL # BLD AUTO: 0.1 K/UL
EOSINOPHIL NFR BLD: 1.6 %
ERYTHROCYTE [DISTWIDTH] IN BLOOD BY AUTOMATED COUNT: 13.7 %
ERYTHROCYTE [SEDIMENTATION RATE] IN BLOOD BY WESTERGREN METHOD: 20 MM/HR
EST. GFR  (AFRICAN AMERICAN): 58 ML/MIN/1.73 M^2
EST. GFR  (NON AFRICAN AMERICAN): 50.3 ML/MIN/1.73 M^2
GLUCOSE SERPL-MCNC: 105 MG/DL
HCT VFR BLD AUTO: 32.8 %
HGB BLD-MCNC: 10.4 G/DL
IMM GRANULOCYTES # BLD AUTO: 0.06 K/UL
IMM GRANULOCYTES NFR BLD AUTO: 1.2 %
LYMPHOCYTES # BLD AUTO: 2.2 K/UL
LYMPHOCYTES NFR BLD: 43.3 %
MCH RBC QN AUTO: 31.8 PG
MCHC RBC AUTO-ENTMCNC: 31.7 G/DL
MCV RBC AUTO: 100 FL
MONOCYTES # BLD AUTO: 0.6 K/UL
MONOCYTES NFR BLD: 11 %
NEUTROPHILS # BLD AUTO: 2.2 K/UL
NEUTROPHILS NFR BLD: 42.1 %
NRBC BLD-RTO: 1 /100 WBC
PLATELET # BLD AUTO: 309 K/UL
PMV BLD AUTO: 9.5 FL
POTASSIUM SERPL-SCNC: 3.7 MMOL/L
PROT SERPL-MCNC: 6.5 G/DL
RBC # BLD AUTO: 3.27 M/UL
SODIUM SERPL-SCNC: 139 MMOL/L
WBC # BLD AUTO: 5.1 K/UL

## 2018-07-16 PROCEDURE — 82085 ASSAY OF ALDOLASE: CPT

## 2018-07-16 PROCEDURE — 85651 RBC SED RATE NONAUTOMATED: CPT

## 2018-07-16 PROCEDURE — 82550 ASSAY OF CK (CPK): CPT

## 2018-07-16 PROCEDURE — 85025 COMPLETE CBC W/AUTO DIFF WBC: CPT

## 2018-07-16 PROCEDURE — 36415 COLL VENOUS BLD VENIPUNCTURE: CPT | Mod: PO

## 2018-07-16 PROCEDURE — 80053 COMPREHEN METABOLIC PANEL: CPT

## 2018-07-16 PROCEDURE — 86140 C-REACTIVE PROTEIN: CPT

## 2018-07-17 ENCOUNTER — PATIENT MESSAGE (OUTPATIENT)
Dept: RHEUMATOLOGY | Facility: CLINIC | Age: 73
End: 2018-07-17

## 2018-07-18 ENCOUNTER — HOSPITAL ENCOUNTER (OUTPATIENT)
Dept: RADIOLOGY | Facility: HOSPITAL | Age: 73
Discharge: HOME | End: 2018-07-18
Attending: NEUROLOGICAL SURGERY
Payer: MEDICARE

## 2018-07-18 ENCOUNTER — OFFICE VISIT (OUTPATIENT)
Dept: NEUROSURGERY | Facility: CLINIC | Age: 73
End: 2018-07-18
Payer: MEDICARE

## 2018-07-18 VITALS
DIASTOLIC BLOOD PRESSURE: 79 MMHG | TEMPERATURE: 98 F | HEART RATE: 86 BPM | BODY MASS INDEX: 23.46 KG/M2 | SYSTOLIC BLOOD PRESSURE: 136 MMHG | WEIGHT: 120.13 LBS

## 2018-07-18 DIAGNOSIS — M54.2 NECK PAIN: ICD-10-CM

## 2018-07-18 DIAGNOSIS — R29.898 LEG WEAKNESS, BILATERAL: ICD-10-CM

## 2018-07-18 DIAGNOSIS — M54.2 NECK PAIN: Primary | ICD-10-CM

## 2018-07-18 DIAGNOSIS — M54.9 BACK PAIN, UNSPECIFIED BACK LOCATION, UNSPECIFIED BACK PAIN LATERALITY, UNSPECIFIED CHRONICITY: ICD-10-CM

## 2018-07-18 DIAGNOSIS — M54.40 MIDLINE LOW BACK PAIN WITH SCIATICA, SCIATICA LATERALITY UNSPECIFIED, UNSPECIFIED CHRONICITY: ICD-10-CM

## 2018-07-18 DIAGNOSIS — M54.40 LOW BACK PAIN WITH SCIATICA, SCIATICA LATERALITY UNSPECIFIED, UNSPECIFIED BACK PAIN LATERALITY, UNSPECIFIED CHRONICITY: ICD-10-CM

## 2018-07-18 LAB — ALDOLASE SERPL-CCNC: 1.8 U/L

## 2018-07-18 PROCEDURE — 72114 X-RAY EXAM L-S SPINE BENDING: CPT | Mod: TC

## 2018-07-18 PROCEDURE — 99214 OFFICE O/P EST MOD 30 MIN: CPT | Mod: S$GLB,,, | Performed by: NEUROLOGICAL SURGERY

## 2018-07-18 PROCEDURE — 3078F DIAST BP <80 MM HG: CPT | Mod: CPTII,S$GLB,, | Performed by: NEUROLOGICAL SURGERY

## 2018-07-18 PROCEDURE — 72125 CT NECK SPINE W/O DYE: CPT | Mod: 26,,, | Performed by: RADIOLOGY

## 2018-07-18 PROCEDURE — 3075F SYST BP GE 130 - 139MM HG: CPT | Mod: CPTII,S$GLB,, | Performed by: NEUROLOGICAL SURGERY

## 2018-07-18 PROCEDURE — 72114 X-RAY EXAM L-S SPINE BENDING: CPT | Mod: 26,,, | Performed by: INTERNAL MEDICINE

## 2018-07-18 PROCEDURE — 99999 PR PBB SHADOW E&M-EST. PATIENT-LVL IV: CPT | Mod: PBBFAC,,, | Performed by: NEUROLOGICAL SURGERY

## 2018-07-18 PROCEDURE — 72125 CT NECK SPINE W/O DYE: CPT | Mod: TC

## 2018-07-18 NOTE — PATIENT INSTRUCTIONS
I have personally reviewed the CT Cervical Spine  with the pt which shows stable postoperative changes from prior laminectomy -cervical fusion at C3-C7.    I have provided the pt with an ambulatory referral to physical therapy to address her symptoms.     I will schedule the patient for 6-8 week follow up with [facility] with MRI Lumbar spine.

## 2018-07-18 NOTE — PROGRESS NOTES
Subjective:   I, Kat Jefferson, attest that this documentation has been prepared under the direction and in the presence of Fab Conner MD.     Patient ID: Regino Lawrence is a 72 y.o. female     Chief Complaint: Consult      HPI  The patient is a 72 y.o. female with cervical spinal stenosis s/p posterior C3-C7 laminectomy and fusion who presents today with a complaint of neck and back pain. She was last seen in clinic on 03/29/2017, at which time she reported an aching neck pain at night, but was otherwise pain free. AP and lateral X-ray of the cervical spine, dated 3/29/2017, was reviewed at the time which showed solid bone formation with good hardware position. Today, the pt states her back pain has gradually worsened and is now associated with left hip pain and further radiation through her LLE. She also reports left shoulder and left arm pain and states her left thumb was numb this morning. Her back and leg pain is present when standing and walking but is absent while sitting. She denies any neck pain at this time. Pt ambulates with the assistance of a walker and states her legs intermittently gives out.    Review of Systems   Constitutional: Negative for activity change, fatigue, fever and unexpected weight change.   HENT: Negative for facial swelling.    Eyes: Negative.    Respiratory: Negative.    Cardiovascular: Negative.    Gastrointestinal: Negative for diarrhea, nausea and vomiting.   Genitourinary: Negative.    Musculoskeletal: Positive for back pain. Negative for joint swelling, myalgias, neck pain and neck stiffness.        Positive for left hip and lower extremity pain.    Neurological: Positive for numbness (left thumb). Negative for dizziness, weakness and headaches.   Psychiatric/Behavioral: Negative.       Past Medical History:   Diagnosis Date    Acid reflux     Allergy     Alopecia     Anemia     Anxiety     Arthritis     Back pain     Cataract     Chronic kidney disease      Depression     Diabetes mellitus, type 2     Eye injury as a child     k-abrasion  od    Hyperlipidemia     Hypertension     Hypothyroidism     Immune deficiency disorder     Immune disorder     Myalgia and myositis 9/6/2012    Osteoporosis     Polyneuropathy     Renal manifestation of secondary diabetes mellitus     Sarcoidosis     Ulcer     no cancer    Urinary incontinence        Objective:      Vitals:    07/18/18 0930   BP: 136/79   Pulse: 86   Temp: 98.3 °F (36.8 °C)      Physical Exam   Constitutional: She is oriented to person, place, and time. She appears well-developed and well-nourished.   HENT:   Head: Normocephalic and atraumatic.   Neck: Neck supple.   Neurological: She is alert and oriented to person, place, and time. No cranial nerve deficit. She displays a negative Romberg sign. GCS eye subscore is 4. GCS verbal subscore is 5. GCS motor subscore is 6.          IMAGING:  CT Cervical Spine WO Contrast (07/18/2018) shows stable postoperative changes from prior C3-C7 laminectomy and fusion with proper hardware placement and alignment.      I have personally reviewed the images with the pt.      I, Dr. Fab Conner, personally performed the services described in this documentation. All medical record entries made by the scribe, Kat Jefferson, were at my direction and in my presence.  I have reviewed the chart and agree that the record reflects my personal performance and is accurate and complete. Fab Conner MD. 07/18/2018    Assessment:       1. Neck pain    2. Midline low back pain with sciatica, sciatica laterality unspecified, unspecified chronicity    3. Low back pain with sciatica, sciatica laterality unspecified, unspecified back pain laterality, unspecified chronicity         Plan:   I have personally reviewed the CT Cervical Spine  with the pt which shows stable postoperative changes from prior C3-C7 laminectomy and fusion with proper hardware placement and alignment.    I have  provided the pt with an ambulatory referral to physical therapy to address her symptoms.     I will schedule the patient for 6-8 week follow up with [facility] with MRI Lumbar spine.

## 2018-07-19 DIAGNOSIS — E11.9 TYPE 2 DIABETES MELLITUS WITHOUT COMPLICATION: ICD-10-CM

## 2018-07-25 ENCOUNTER — PATIENT MESSAGE (OUTPATIENT)
Dept: FAMILY MEDICINE | Facility: CLINIC | Age: 73
End: 2018-07-25

## 2018-07-25 ENCOUNTER — TELEPHONE (OUTPATIENT)
Dept: FAMILY MEDICINE | Facility: CLINIC | Age: 73
End: 2018-07-25

## 2018-07-25 ENCOUNTER — PATIENT MESSAGE (OUTPATIENT)
Dept: RHEUMATOLOGY | Facility: CLINIC | Age: 73
End: 2018-07-25

## 2018-07-25 DIAGNOSIS — G72.9 MYOPATHY: ICD-10-CM

## 2018-07-25 DIAGNOSIS — D86.9 SARCOIDOSIS: Chronic | ICD-10-CM

## 2018-07-25 RX ORDER — TRAMADOL HYDROCHLORIDE 50 MG/1
TABLET ORAL
Qty: 180 TABLET | Refills: 0 | Status: SHIPPED | OUTPATIENT
Start: 2018-07-25 | End: 2018-07-27

## 2018-07-25 NOTE — TELEPHONE ENCOUNTER
----- Message from Estrellita Diaz sent at 7/25/2018  2:10 PM CDT -----  Contact: Pharmacy  Pharmacy calling to get a diagnosis for tramadol. Please call [phone]

## 2018-07-25 NOTE — TELEPHONE ENCOUNTER
Spoke with josue at [facility] to confirm order for tramadol and dx code. Verbalized understanding

## 2018-07-26 RX ORDER — PREDNISONE 10 MG/1
10 TABLET ORAL DAILY
Qty: 90 TABLET | Refills: 1 | Status: ON HOLD | OUTPATIENT
Start: 2018-07-26 | End: 2018-10-08 | Stop reason: HOSPADM

## 2018-07-26 RX ORDER — CYCLOBENZAPRINE HCL 10 MG
10 TABLET ORAL 3 TIMES DAILY PRN
Qty: 270 TABLET | Refills: 0 | Status: SHIPPED | OUTPATIENT
Start: 2018-07-26 | End: 2019-01-22 | Stop reason: SDUPTHER

## 2018-07-27 ENCOUNTER — INFUSION (OUTPATIENT)
Dept: INFUSION THERAPY | Facility: HOSPITAL | Age: 73
End: 2018-07-27
Attending: PODIATRIST
Payer: MEDICARE

## 2018-07-27 VITALS
HEART RATE: 92 BPM | TEMPERATURE: 98 F | DIASTOLIC BLOOD PRESSURE: 74 MMHG | OXYGEN SATURATION: 97 % | RESPIRATION RATE: 17 BRPM | SYSTOLIC BLOOD PRESSURE: 144 MMHG

## 2018-07-27 DIAGNOSIS — N18.9 ANEMIA IN CHRONIC KIDNEY DISEASE, UNSPECIFIED CKD STAGE: Primary | ICD-10-CM

## 2018-07-27 DIAGNOSIS — D63.1 ANEMIA IN CHRONIC KIDNEY DISEASE, UNSPECIFIED CKD STAGE: Primary | ICD-10-CM

## 2018-07-27 DIAGNOSIS — Z53.1 REFUSAL OF BLOOD TRANSFUSIONS AS PATIENT IS JEHOVAH'S WITNESS: ICD-10-CM

## 2018-07-27 PROCEDURE — 63600175 PHARM REV CODE 636 W HCPCS: Mod: JG | Performed by: INTERNAL MEDICINE

## 2018-07-27 PROCEDURE — 96372 THER/PROPH/DIAG INJ SC/IM: CPT

## 2018-07-27 RX ADMIN — ERYTHROPOIETIN 20000 UNITS: 20000 INJECTION, SOLUTION INTRAVENOUS; SUBCUTANEOUS at 11:07

## 2018-07-31 ENCOUNTER — OFFICE VISIT (OUTPATIENT)
Dept: FAMILY MEDICINE | Facility: CLINIC | Age: 73
End: 2018-07-31
Payer: MEDICARE

## 2018-07-31 ENCOUNTER — TELEPHONE (OUTPATIENT)
Dept: FAMILY MEDICINE | Facility: CLINIC | Age: 73
End: 2018-07-31

## 2018-07-31 VITALS
TEMPERATURE: 98 F | HEIGHT: 60 IN | HEART RATE: 90 BPM | WEIGHT: 120.13 LBS | BODY MASS INDEX: 23.58 KG/M2 | OXYGEN SATURATION: 98 % | SYSTOLIC BLOOD PRESSURE: 110 MMHG | DIASTOLIC BLOOD PRESSURE: 70 MMHG

## 2018-07-31 DIAGNOSIS — M70.62 TROCHANTERIC BURSITIS OF LEFT HIP: Primary | ICD-10-CM

## 2018-07-31 PROCEDURE — 3074F SYST BP LT 130 MM HG: CPT | Mod: CPTII,S$GLB,, | Performed by: NURSE PRACTITIONER

## 2018-07-31 PROCEDURE — 99999 PR PBB SHADOW E&M-EST. PATIENT-LVL V: CPT | Mod: PBBFAC,,, | Performed by: NURSE PRACTITIONER

## 2018-07-31 PROCEDURE — 99214 OFFICE O/P EST MOD 30 MIN: CPT | Mod: S$GLB,,, | Performed by: NURSE PRACTITIONER

## 2018-07-31 PROCEDURE — 3078F DIAST BP <80 MM HG: CPT | Mod: CPTII,S$GLB,, | Performed by: NURSE PRACTITIONER

## 2018-07-31 RX ORDER — ACETAMINOPHEN AND CODEINE PHOSPHATE 300; 60 MG/1; MG/1
1 TABLET ORAL
Qty: 30 TABLET | Refills: 0 | Status: SHIPPED | OUTPATIENT
Start: 2018-07-31 | End: 2018-08-08

## 2018-07-31 NOTE — PATIENT INSTRUCTIONS
Bursitis  You have bursitis. This is an inflammation of the bursa. These are small, fluid-filled sacs that surround the larger joints of the body. The bursa help the muscles and tendons move smoothly over the joints.  Bursitis often happens in the shoulder. But it can also affect the elbows, hips, pelvis, knees, toes, and heels. Bursitis can be caused by injury, overuse of the joint, or infection of the bursa. Symptoms include pain and tenderness over a joint. Symptoms get worse with movement.  Bursitis is treated with an anti-inflammatory medicine and by resting the joint. More severe cases require injection of medicine directly into the bursa.    Home care  · Rest the painful joint and protect it from movement. This will allow the inflammation to heal faster.  · Apply an ice pack over the injured area for no more than 15 to 20 minutes. Do this every 3 to 6 hours for the first 24 to 48 hours. Keep using ice packs 3 to 4 times a day until the pain and swelling improves.   · To make an ice pack, put ice cubes in a sealed plastic zip-lock bag. Wrap the bag in a clean, thin towel or cloth. Never put ice or an ice pack directly on the skin. As the ice melts, be careful to avoid getting any wrap or splint wet.  · You may take over-the-counter pain medicine to treat pain and inflammation, unless another medicine was prescribed. Anti-inflammatory pain medicines may be more effective. Talk with your provider beforeusing these medicines if you have chronic liver or kidney disease, or ever had a stomach ulcer or GI (gastrointestinal) bleeding.  · As your symptoms improve, slowly begin to move the joint. Do not overuse the joint. This may cause the symptoms to flare up again.  When to seek medical advice  Call your healthcare provider right away if any of these occur:  · Redness over the painful area  · Increasing pain or swelling at the joint  · Fever of 100.4°F (38°C) or above lasting for 24 to 48 hours  Date Last  Reviewed: 11/21/2015  © 1540-7435 [employer]. [address] [place] [address], [place] [address]. All rights reserved. This information is not intended as a substitute for professional medical care. Always follow your healthcare professional's instructions.

## 2018-07-31 NOTE — PROGRESS NOTES
"Subjective:       Patient ID: Regino Lawrence is a 72 y.o. female.    Chief Complaint: Leg Pain (left leg pain)      Patient is a 72 year old female known to me who presented to clinic with complaints of left leg and hip pain, onset x 1 month, denies injury or fall, states has "bad disc in my back" with surgery scheduled.  Patient has been several times for the same complaint. Patient was in the ED 07/27/2018 and was seen on 7/17  at [facility] for back issues.   She has had a recent change to her medications and has not had her tramadol in several days.  The patient reports that tramadol usually relieves her pain. Patient has an appointment scheduled for an MRI of her low back in the next 4 weeks.  She denies any bowel or bladder changes.  She denies any trauma.  There has been no weight loss or fever.        Leg Pain    The incident occurred more than 1 week ago. The incident occurred at home. There was no injury mechanism. The pain is present in the left leg and left hip. The quality of the pain is described as aching. The pain is at a severity of 7/10. The pain is moderate. The pain has been constant since onset. Pertinent negatives include no inability to bear weight, loss of motion, loss of sensation, muscle weakness, numbness or tingling. She reports no foreign bodies present. The symptoms are aggravated by movement, palpation and weight bearing. She has tried nothing for the symptoms.     Review of Systems   Constitutional: Negative for chills, diaphoresis, fatigue and fever.   Respiratory: Negative for cough, chest tightness, shortness of breath and wheezing.    Cardiovascular: Negative for chest pain, palpitations and leg swelling.   Gastrointestinal: Negative for abdominal pain, constipation, diarrhea, nausea and vomiting.   Genitourinary: Negative for dysuria.   Musculoskeletal: Positive for arthralgias, back pain and gait problem. Negative for myalgias. Joint swelling: Walks with a walker    Skin: Negative " for color change and rash.   Neurological: Negative for dizziness, tingling, weakness, light-headedness, numbness and headaches.       Objective:      Physical Exam   Constitutional: She is oriented to person, place, and time. Vital signs are normal. She appears well-developed and well-nourished.   Cardiovascular: Normal rate, regular rhythm and normal heart sounds.    Pulmonary/Chest: Effort normal and breath sounds normal.   Musculoskeletal:        Left hip: She exhibits decreased range of motion, tenderness and bony tenderness. She exhibits normal strength, no swelling, no crepitus, no deformity and no laceration.   Neurological: She is alert and oriented to person, place, and time.   Skin: Skin is warm, dry and intact. Capillary refill takes less than 2 seconds.   Psychiatric: She has a normal mood and affect.       Assessment:       1. Trochanteric bursitis of left hip        Plan:       Regino was seen today for leg pain.    Diagnoses and all orders for this visit:    Trochanteric bursitis of left hip  -     acetaminophen-codeine 300-60mg (TYLENOL #4) 300-60 mg Tab; Take 1 tablet by mouth every 6 to 8 hours as needed.     Home care  · Rest the painful joint and protect it from movement. This will allow the inflammation to heal faster.  · Apply an ice pack over the injured area for no more than 15 to 20 minutes. Do this every 3 to 6 hours for the first 24 to 48 hours. Keep using ice packs 3 to 4 times a day until the pain and swelling improves.   · To make an ice pack, put ice cubes in a sealed plastic zip-lock bag. Wrap the bag in a clean, thin towel or cloth. Never put ice or an ice pack directly on the skin. As the ice melts, be careful to avoid getting any wrap or splint wet.  · You may take over-the-counter pain medicine to treat pain and inflammation, unless another medicine was prescribed. Anti-inflammatory pain medicines may be more effective. Talk with your provider beforeusing these medicines if you  have chronic liver or kidney disease, or ever had a stomach ulcer or GI (gastrointestinal) bleeding.  · As your symptoms improve, slowly begin to move the joint. Do not overuse the joint. This may cause the symptoms to flare up again.  When to seek medical advice  Call your healthcare provider right away if any of these occur:  · Redness over the painful area  · Increasing pain or swelling at the joint  · Fever of 100.4°F (38°C) or above lasting for 24 to 48 hours  Date Last Reviewed: 11/21/2015 © 2000-2017 [employer]. [address] [place] [address]. All rights reserved. This information is not intended as a substitute for professional medical care. Always follow your healthcare professional's instructions.

## 2018-07-31 NOTE — TELEPHONE ENCOUNTER
----- Message from Lola Bustamante sent at 7/31/2018 11:38 AM CDT -----  Contact: Phamacy- [facility]  Pharmacy is calling to verify medication acetaminophen-codeine 300-60mg (TYLENOL #4) 300-60 mg Tab and traMADol (ULTRAM) 50 mg tablet. Please call Neha at [phone].

## 2018-08-01 ENCOUNTER — TELEPHONE (OUTPATIENT)
Dept: NEUROSURGERY | Facility: CLINIC | Age: 73
End: 2018-08-01

## 2018-08-01 ENCOUNTER — TELEPHONE (OUTPATIENT)
Dept: FAMILY MEDICINE | Facility: CLINIC | Age: 73
End: 2018-08-01

## 2018-08-01 NOTE — TELEPHONE ENCOUNTER
Pharmacy asking why patient is taking tylenol #4 and tramadol for the same diagnosis. Please advise

## 2018-08-01 NOTE — TELEPHONE ENCOUNTER
I returned the pt call and assured her that the orders for PT are to address the neck and back. The pt verbalized understanding and was confirm with therapy of the orders.

## 2018-08-01 NOTE — TELEPHONE ENCOUNTER
----- Message from Marquita Loo sent at 8/1/2018  9:59 AM CDT -----  Contact: self  Patient states need to speak with nurse.   Patient states has appointment scheduled for physical therapy 8/8/2018 for neck.     Patient states  need referral for Physical therapy is suppose to be for back.   Please call pt at for more info [phone]

## 2018-08-01 NOTE — TELEPHONE ENCOUNTER
We were told the tramadol would not be filled due to her insurance which is why she came for the tylenol 3, if her insurance approves the tramadol, then fill the tramadol

## 2018-08-01 NOTE — TELEPHONE ENCOUNTER
----- Message from Lola Bustamante sent at 8/1/2018  9:35 AM CDT -----  Contact: Pharmacy  Pharmacy is calling to get clarification on the acetaminophen-codeine 300-60mg (TYLENOL #4) 300-60 mg Tab. Please call Tariq at [phone].

## 2018-08-01 NOTE — TELEPHONE ENCOUNTER
Spoken to Hung at patient's pharmacy. Stated that patient was prescribed Tramadol on 7/27/18 by Genet Tillman NP and Tylenol #3 on yesterday by Dr. Mendoza. Pharmacy wants to know what Rx needs to be filled. Please advise.

## 2018-08-02 ENCOUNTER — PATIENT MESSAGE (OUTPATIENT)
Dept: FAMILY MEDICINE | Facility: CLINIC | Age: 73
End: 2018-08-02

## 2018-08-02 ENCOUNTER — TELEPHONE (OUTPATIENT)
Dept: FAMILY MEDICINE | Facility: CLINIC | Age: 73
End: 2018-08-02

## 2018-08-02 ENCOUNTER — OFFICE VISIT (OUTPATIENT)
Dept: HEMATOLOGY/ONCOLOGY | Facility: CLINIC | Age: 73
End: 2018-08-02
Payer: MEDICARE

## 2018-08-02 VITALS
BODY MASS INDEX: 24.15 KG/M2 | HEART RATE: 92 BPM | TEMPERATURE: 98 F | OXYGEN SATURATION: 97 % | DIASTOLIC BLOOD PRESSURE: 62 MMHG | HEIGHT: 60 IN | SYSTOLIC BLOOD PRESSURE: 127 MMHG | WEIGHT: 123 LBS

## 2018-08-02 DIAGNOSIS — D86.9 SARCOIDOSIS: Chronic | ICD-10-CM

## 2018-08-02 DIAGNOSIS — D63.1 ANEMIA IN CHRONIC KIDNEY DISEASE, UNSPECIFIED CKD STAGE: Primary | ICD-10-CM

## 2018-08-02 DIAGNOSIS — N18.9 ANEMIA IN CHRONIC KIDNEY DISEASE, UNSPECIFIED CKD STAGE: Primary | ICD-10-CM

## 2018-08-02 DIAGNOSIS — M48.02 CERVICAL SPINAL STENOSIS: ICD-10-CM

## 2018-08-02 DIAGNOSIS — Z53.1 REFUSAL OF BLOOD TRANSFUSIONS AS PATIENT IS JEHOVAH'S WITNESS: ICD-10-CM

## 2018-08-02 DIAGNOSIS — M25.552 LEFT HIP PAIN: ICD-10-CM

## 2018-08-02 PROCEDURE — 3074F SYST BP LT 130 MM HG: CPT | Mod: CPTII,S$GLB,, | Performed by: INTERNAL MEDICINE

## 2018-08-02 PROCEDURE — 3078F DIAST BP <80 MM HG: CPT | Mod: CPTII,S$GLB,, | Performed by: INTERNAL MEDICINE

## 2018-08-02 PROCEDURE — 99999 PR PBB SHADOW E&M-EST. PATIENT-LVL V: CPT | Mod: PBBFAC,,, | Performed by: INTERNAL MEDICINE

## 2018-08-02 PROCEDURE — 99213 OFFICE O/P EST LOW 20 MIN: CPT | Mod: S$GLB,,, | Performed by: INTERNAL MEDICINE

## 2018-08-02 NOTE — TELEPHONE ENCOUNTER
Spoke with the pharmacy and they stated that pt's tramadol was approved and filled, ststed they would contact pt.

## 2018-08-02 NOTE — PROGRESS NOTES
Subjective:       Patient ID: Regino Lawrence is a 72 y.o. female.    Chief Complaint: Follow-up  Diagnosis: Anemia in CKD  Patient is a Mosque  .  HPI    The patient is seen today for chronic anemia in CKD. The patient reports that she has been diagnosed with JOLLY in the past.  She has been on oral iron supplementation therapy, but could not tolerate or did not respond, she is uncertain.  She is followed by GI and has undergone a colonoscopy earlier this year and was diagnosed with hemorrhoids for which she underwent banding procedure.  No melena, hematochezia,change in bowel habits.  She has also been diagnosed with B12 deficiency in the past, but reports she did not respond to B12 injections. No history of blood transfusions.  She is a Mosque.  She reports that she remembers getting injections in the 1970s when her blood count was low.        She is followed by Rheumatology for history of sarcoidosis with associated myopathy and arthropathy.   .She has been treated in the  past with methotrexate and Plaquenil, both of which were ineffective  Cellcept and imuran caused some unknown side effect.   Colchicine  held due to low WBC   She continues on chronic steroid therapy, Prednisone 10mg qd    Today, she continues with Chronic diffuse arthralgias  She ambulates with cane  Mild  Chronic fatigue-stable  No SOB/CP  She recently presented to ED with  left leg and hip pain, onset x 1 month, denies injury or fall,    Patient was in the ED 07/27/2018 and was seen on 7/17  at [facility] for back issues  Prescribed T# 3 for pain - has not filled   She is followed by Neurosurgery for cervical spinal stenosis s/p posterior C3-C7 laminectomy and fusion        She continues to undergo Procrit therapy q 2wks  She undergoes intermittent IV iron therapy          CBC  reveals wbc 5710/mm3  Hb 10.9 g/dl Hct 33 % Plt ct 295k    PAST MEDICAL HISTORY:  Acid reflux, alopecia, anemia, anxiety disorder, chronic  kidney  disease, depression, diabetes mellitus type 2, hyperlipidemia,  hypertension, hypothyroidism, osteoporosis, sarcoidosis.    PAST SURGICAL HISTORY:  Cholecystectomy, , tubal ligation, carpal tunnel release, cataracts.    FAMILY HISTORY: Unremarkable for cancer. Significant for HTN.       Review of Systems   Constitutional: Negative for activity change, appetite change and fatigue.   HENT: Negative for hearing loss and nosebleeds.    Eyes: Negative for visual disturbance.   Respiratory: Negative for cough and shortness of breath.    Cardiovascular: Negative for chest pain and leg swelling.   Gastrointestinal: Negative for abdominal pain, constipation, diarrhea and nausea.   Genitourinary: Negative for flank pain and urgency.   Musculoskeletal: Positive for arthralgias, back pain, gait problem and joint swelling.   Skin: Negative for rash.        No petechiae, ecchymoses   Neurological: Negative for light-headedness and headaches.   Hematological: Negative for adenopathy. Does not bruise/bleed easily.       Objective:       Vitals:    18 1040 18 1042   BP: (!) 143/64 127/62   BP Location: Right arm Left arm   Patient Position: Sitting Sitting   BP Method: Medium (Automatic) Medium (Automatic)   Pulse: 92    Temp: 98 °F (36.7 °C)    TempSrc: Oral    SpO2: 97%    Weight: 55.8 kg (123 lb 0.3 oz)    Height: 5' (1.524 m)        Physical Exam   Constitutional: She is oriented to person, place, and time. She appears well-developed and well-nourished.   HENT:   Head: Normocephalic.   Mouth/Throat: Oropharynx is clear and moist. No oropharyngeal exudate.   Eyes: Conjunctivae and lids are normal. Pupils are equal, round, and reactive to light. No scleral icterus.   Neck: Normal range of motion. Neck supple. No thyromegaly present.   Cardiovascular: Normal rate, regular rhythm and normal heart sounds.    No murmur heard.  Pulmonary/Chest: Breath sounds normal. She has no wheezes. She has no rales.    Abdominal: Soft. Bowel sounds are normal. She exhibits no distension and no mass. There is no hepatosplenomegaly. There is no tenderness. There is no rebound and no guarding.   Musculoskeletal: Normal range of motion. She exhibits no edema or tenderness.   Lymphadenopathy:     She has no cervical adenopathy.     She has no axillary adenopathy.        Right: No supraclavicular adenopathy present.        Left: No supraclavicular adenopathy present.   Neurological: She is alert and oriented to person, place, and time. No cranial nerve deficit. Coordination normal.   Skin: Skin is warm and dry. No ecchymosis, no petechiae and no rash noted. No erythema.   Psychiatric: She has a normal mood and affect.             Lab Results   Component Value Date    WBC 5.71 07/27/2018    HGB 10.9 (L) 07/27/2018    HCT 33.0 (L) 07/27/2018    MCV 98 07/27/2018     07/27/2018     Lab Results   Component Value Date    IRON 47 07/27/2018    TIBC 407 07/27/2018    FERRITIN 96 07/27/2018     SPEP-nl          CT renal 3/6/2017   IMPRESSION:  1.  No renal, ureteral or bladder calculi.  No hydronephrosis or ureterectasis.  2.  Poorly distended bladder.  Mild bladder wall prominence.  Mild cystitis cannot be   excluded.  3.  Moderate constipation.  Normal appendix      Lab Results   Component Value Date    IRON 47 07/27/2018    TIBC 407 07/27/2018    FERRITIN 96 07/27/2018       Assessment:       1. Anemia in chronic kidney disease, unspecified CKD stage    2. Refusal of blood transfusions as patient is Islam    3. Left hip pain        Plan:   1-3 Pt clinically stable  Pt is a Muslim and declines/not interested in blood and blood products due to Islam beliefs  Hb 10.9  g/dl -stable  Ferritin 96  Cont procrit to 20,000u q 2wk    CBC q2wks STANDING    Plan AMBULATORY REFERRAL TO PAIN MANAGEMENT - evaluation for steroid inj?     Follow-up with PCP for med mgmt    F/u 2 mos with Fe studies        CC: Micaela Mendoza  M.D.

## 2018-08-02 NOTE — TELEPHONE ENCOUNTER
----- Message from Carlie Erickson sent at 8/2/2018  9:55 AM CDT -----  Contact: [facility] [place]  Pharm calling to get auth for meds,said they've been calling for three days and pt keeps calling to get meds    acetaminophen-codeine 300-60mg (TYLENOL #4) 300-60 mg Tab 30 tablet 0     traMADol (ULTRAM) 50 mg tablet 30 tablet       [facility] [address] [place] [address] [id number] [place] [address] [phone] (Phone)  [phone] (Fax)

## 2018-08-03 ENCOUNTER — TELEPHONE (OUTPATIENT)
Dept: HEMATOLOGY/ONCOLOGY | Facility: CLINIC | Age: 73
End: 2018-08-03

## 2018-08-03 DIAGNOSIS — N18.9 ANEMIA IN CKD (CHRONIC KIDNEY DISEASE): ICD-10-CM

## 2018-08-03 DIAGNOSIS — D63.1 ANEMIA IN CKD (CHRONIC KIDNEY DISEASE): ICD-10-CM

## 2018-08-03 DIAGNOSIS — N18.9 CHRONIC KIDNEY DISEASE, UNSPECIFIED: Primary | ICD-10-CM

## 2018-08-06 ENCOUNTER — OFFICE VISIT (OUTPATIENT)
Dept: OPHTHALMOLOGY | Facility: CLINIC | Age: 73
End: 2018-08-06
Payer: MEDICARE

## 2018-08-06 DIAGNOSIS — H52.7 REFRACTIVE ERROR: ICD-10-CM

## 2018-08-06 DIAGNOSIS — I10 ESSENTIAL HYPERTENSION: ICD-10-CM

## 2018-08-06 DIAGNOSIS — E11.3292 CONTROLLED TYPE 2 DIABETES MELLITUS WITH LEFT EYE AFFECTED BY MILD NONPROLIFERATIVE RETINOPATHY WITHOUT MACULAR EDEMA, WITHOUT LONG-TERM CURRENT USE OF INSULIN: Primary | ICD-10-CM

## 2018-08-06 DIAGNOSIS — H17.9 CORNEAL SCAR, RIGHT EYE: ICD-10-CM

## 2018-08-06 DIAGNOSIS — H04.123 DRY EYE SYNDROME, BILATERAL: ICD-10-CM

## 2018-08-06 DIAGNOSIS — Z96.1 PSEUDOPHAKIA: ICD-10-CM

## 2018-08-06 PROCEDURE — 99999 PR PBB SHADOW E&M-EST. PATIENT-LVL II: CPT | Mod: PBBFAC,,, | Performed by: OPHTHALMOLOGY

## 2018-08-06 PROCEDURE — 92014 COMPRE OPH EXAM EST PT 1/>: CPT | Mod: S$GLB,,, | Performed by: OPHTHALMOLOGY

## 2018-08-06 NOTE — PROGRESS NOTES
Subjective:       Patient ID: Regino Lawrence is a 72 y.o. female.    Chief Complaint: Eye Exam    HPI     DSL- 3/23/17     72 y.o female is here for routine eye exam. Pt c/o that Va has been   blurry. Pt has dry and itchy eyes. Pt denies floaters and flashes. BSL was   X 121 yesterday night.     Eyemeds  At's OU BID     Last edited by Christa Osborne on 8/6/2018  2:10 PM. (History)             Assessment:       1. 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 Corneal scar, right eye    3. Dry eye syndrome, bilateral    4. Essential hypertension    5. Refractive error    6. Pseudophakia        Plan:       Mild NPDR OS-No CSME.  K scar OD-Stable.  JOSELINE-Doing well.  HTN-No retinopathy OU.  RE-Pt does not need MRx.      AT's.  Control DM & HTN.  RTC 1 yr.

## 2018-08-07 ENCOUNTER — PATIENT MESSAGE (OUTPATIENT)
Dept: FAMILY MEDICINE | Facility: CLINIC | Age: 73
End: 2018-08-07

## 2018-08-07 DIAGNOSIS — M51.36 DEGENERATIVE DISC DISEASE, LUMBAR: Primary | ICD-10-CM

## 2018-08-08 ENCOUNTER — TELEPHONE (OUTPATIENT)
Dept: FAMILY MEDICINE | Facility: CLINIC | Age: 73
End: 2018-08-08

## 2018-08-08 ENCOUNTER — CLINICAL SUPPORT (OUTPATIENT)
Dept: REHABILITATION | Facility: HOSPITAL | Age: 73
End: 2018-08-08
Attending: NEUROLOGICAL SURGERY
Payer: MEDICARE

## 2018-08-08 DIAGNOSIS — G89.29 CHRONIC BILATERAL LOW BACK PAIN WITH LEFT-SIDED SCIATICA: ICD-10-CM

## 2018-08-08 DIAGNOSIS — M54.42 CHRONIC BILATERAL LOW BACK PAIN WITH LEFT-SIDED SCIATICA: ICD-10-CM

## 2018-08-08 DIAGNOSIS — M62.81 MUSCLE WEAKNESS: Primary | ICD-10-CM

## 2018-08-08 DIAGNOSIS — M54.2 NECK PAIN: ICD-10-CM

## 2018-08-08 DIAGNOSIS — R29.818 POOR MOTOR CONTROL OF TRUNK: ICD-10-CM

## 2018-08-08 PROCEDURE — 97110 THERAPEUTIC EXERCISES: CPT | Mod: PN

## 2018-08-08 PROCEDURE — G8978 MOBILITY CURRENT STATUS: HCPCS | Mod: CL,PN

## 2018-08-08 PROCEDURE — G8979 MOBILITY GOAL STATUS: HCPCS | Mod: CK,PN

## 2018-08-08 PROCEDURE — 97161 PT EVAL LOW COMPLEX 20 MIN: CPT | Mod: PN

## 2018-08-08 RX ORDER — HYDROCODONE BITARTRATE AND ACETAMINOPHEN 5; 325 MG/1; MG/1
1 TABLET ORAL EVERY 6 HOURS PRN
Qty: 90 TABLET | Refills: 0 | Status: SHIPPED | OUTPATIENT
Start: 2018-08-08 | End: 2018-09-12 | Stop reason: SDUPTHER

## 2018-08-08 NOTE — TELEPHONE ENCOUNTER
----- Message from Margo Pickard sent at 8/8/2018  3:25 PM CDT -----  Contact: [facility] [place] [facility]    [place]   HYDROcodone-acetaminophen (NORCO) 5-325 mg per tablet      Just wants to know if its a chronic or acute pain management. Please call at [phone]    [facility] [address]  [place], [place] - [address] [facility]

## 2018-08-08 NOTE — PLAN OF CARE
Physical Therapy Initial Evaluation     Name: Regino Lawrence  [facility] Number: [id number]    Diagnosis:   Encounter Diagnoses   Name Primary?    Neck pain     Muscle weakness Yes    Chronic bilateral low back pain with left-sided sciatica     Poor motor control of trunk      Physician: Fab Conner MD  Treatment Orders: PT Eval and Treat  Past Medical History:   Diagnosis Date    Acid reflux     Allergy     Alopecia     Anemia     Anxiety     Arthritis     Back pain     Cataract     Chronic kidney disease     Depression     Diabetes mellitus, type 2     Eye injury as a child     k-abrasion  od    Hyperlipidemia     Hypertension     Hypothyroidism     Immune deficiency disorder     Immune disorder     Myalgia and myositis 9/6/2012    Osteoporosis     Polyneuropathy     Renal manifestation of secondary diabetes mellitus     Sarcoidosis     Ulcer     no cancer    Urinary incontinence      Current Outpatient Prescriptions   Medication Sig    ACCU-CHEK FASTCLIX Kit USE AS DIRECTED.    acetaminophen-codeine 300-60mg (TYLENOL #4) 300-60 mg Tab Take 1 tablet by mouth every 6 to 8 hours as needed.    ALCOHOL ANTISEPTIC PADS (ALCOHOL PREP PADS TOP)     alcohol antiseptic pads (ALCOHOL PREP PADS TOP)     amLODIPine (NORVASC) 10 MG tablet Take 0.5 tablets (5 mg total) by mouth once daily.    blood sugar diagnostic Strp 1 each by Misc.(Non-Drug; Combo Route) route once daily.    blood-glucose meter kit Use as instructed    calcium carbonate (OS-MALCOLM) 600 mg calcium (1,500 mg) Tab Take 1 tablet by mouth 2 (two) times daily.     carvedilol (COREG) 12.5 MG tablet Take 1 tablet (12.5 mg total) by mouth 2 (two) times daily.    cloNIDine (CATAPRES) 0.3 MG tablet Take 1 tablet (0.3 mg total) by mouth 3 (three) times daily.    cyclobenzaprine (FLEXERIL) 10 MG tablet Take 1 tablet (10 mg total) by mouth 3 (three) times daily as needed for Muscle  spasms.    epoetin german (PROCRIT INJ) Inject 1,000 Units as directed.    fenofibrate 160 MG Tab Take 1 tablet (160 mg total) by mouth once daily.    fexofenadine (ALLEGRA ALLERGY) 180 MG tablet Take 180 mg by mouth daily as needed.     folic acid (FOLVITE) 1 MG tablet Take 1 tablet (1 mg total) by mouth once daily.    gabapentin (NEURONTIN) 400 MG capsule Take 1 capsule (400 mg total) by mouth 2 (two) times daily. (Patient taking differently: Take 400 mg by mouth once daily. )    lancing device (ACCU-CHEK SOFTCLIX LANCET DEV) Misc Accu-chek FastClix kit    levothyroxine (SYNTHROID) 50 MCG tablet TAKE 1 TABLET (50 MCG TOTAL) BY MOUTH BEFORE BREAKFAST.    LUBRICANT EYE DROPS, GLYC-PG, 1-0.3 % Drop     metFORMIN (GLUCOPHAGE) 1000 MG tablet Take 1 tablet (1,000 mg total) by mouth 2 (two) times daily with meals.    methotrexate 2.5 MG Tab Take 4 tablets (10 mg total) by mouth every 7 days.    ondansetron (ZOFRAN) 8 MG tablet Take 1 tablet (8 mg total) by mouth every 8 (eight) hours as needed for Nausea.    potassium chloride SA (K-DUR,KLOR-CON) 20 MEQ tablet Take 1 tablet (20 mEq total) by mouth 2 (two) times daily.    predniSONE (DELTASONE) 10 MG tablet Take 1 tablet (10 mg total) by mouth once daily.    traMADol (ULTRAM) 50 mg tablet TAKE TWO TABLETS BY MOUTH every 8 hours for pain     No current facility-administered medications for this visit.      Review of patient's allergies indicates:   Allergen Reactions    Azathioprine Shortness Of Breath and Other (See Comments)     Fatigue       Subjective     Patient states:  Chief complaint is L LE pain, related to lumbar spine pathology. Pain began in L great toe to entire L LE, initially thought it was Gout. Has been experiencing excruciating pain in L LE. Pain increases with walking/activity, feels relief with rest/elevating LE. Pain radiating from L foot to anterior shin, anterolateral thigh, and lateral hip pain at gluteal/greater trochanter region. Is  "scheduled for MRI to lumbar spine next week. Feels like L LE is giving out/buckling, and resulting in frequent falls x 3. Most recent fall was last month. Feels numbness/tingling in L calf and dorsal/plantar surface of foot. Also has B knee pain related to arthritis. Reports pain 24/7. 1-story house, no steps to get into. Believes she can walk 2 blocks with RW. S/p C3-7 Laminectomy/Fusion 11/16/15; mild neck/shoulder pain currently.    Imaging:   X-ray  Scoliosis and degenerative change    CT Cervical Spine  "Shows stable postoperative changes from prior C3-C7 laminectomy and fusion with proper hardware placement and alignment."  "Cervical spondylosis, most significant at C5-C6 with severe left and mild right neural foraminal narrowing at this level.  Additional multilevel neural foraminal narrowing as detailed above.  No significant spinal canal stenosis at any level."    Pain Scale: Regino rates pain on a scale of 0-10 to be 10 at worst; 8 currently; 7 at best .  Onset: gradual  Radicular symptoms:  L LE weakness, pain, numbness/tingling  Aggravating factors:   Walking, weight-bearing, bending  Easing factors:  Medication, rest,  Prior Therapy: Yes - for low back at this clinic, with good relief  Functional Deficits Leading to Referral: Difficulty walking, standing, reaching, bending, stairs  Prior functional status: Mod Indep with RW for past 2 years, mod indep with cane ~3 years prior  DME owned/used: RW, cane  Occupation:  Retired                       Pts goals:  Reduce pain, improve strength/mobility, improving walking    Objective     Posture Alignment: Significant forward head with cervical flexion, thoracic kyphosis, decreased lumbar lordosis    CERVICAL SPINE AROM:   Flexion: 50   Extension: 10   Left Sidebend: 20   Right Sidebend: 40   Left Rotation: 20   Right Rotation: 45     UE ROM:  R UE: WFL  L UE: WFL    GROSS UPPER EXTREMITY STRENGTH:  R UE: 4-/5  L UE: 4-/5    Static Balance:  Narrow NORA: 20 " "seconds, intermittent trunk sway  Tandem Stance: 3 seconds bilaterally, LOB requiring UE stabilization  SL Stance: unable to perform on L LE; R LE ~5-10 seconds intermittent LOB    Dermatomes: Sensation: Light Touch: Intact  Myotomes: Intact  Palpation: Tenderness at B lumbar, B gluteal, L hamstring, L calf    LUMBAR SPINE AROM:   Flexion: 50% Limited   Extension: 75% Limited   Left Sidebend: 75% Limited   Right Sidebend: 50% Limited   Left Rotation: 25% Limited   Right Rotation: 25% Limited     SEGMENTAL MOBILITY: Joint Hypomobility L1-5    LOWER EXTREMITY STRENGTH:   Left Right   Quadriceps 4-/5 4/5   Hamstrings 3-/5 4-/5     Iliopsoas 3+/5 4/5   Glute Med 4-/5 4-/5   Hip Ext 3-/5 3-/5   Ankle DF 3+/5 3+/5   Ankle PF 4/5 4/5     Special Tests:   Left Right   Slump Negative Negative   SLR Negative Negative   Crossed SLR Negative Negative   Sacral Thrust Positive Positive   SHAREE Positive (lumbar p!) Negative       Pt/family was provided educational information, including: role of PT, goals for PT, scheduling - pt verbalized understanding. Discussed insurance limitations with pt.     TREATMENT     Time In: 1000  Time Out: 1100    PT Evaluation Completed? Yes  Discussed Plan of Care with patient: Yes    Regino received 10 minutes of therapeutic exercise & instruction including:    Piriformis Str 3x30"  Post Pelvic Tilt 10x    Arletha received 0 minutes of manual therapy including:    Written Home Exercises Provided: Yes - Piriformis Str, PPT  Arletha demo good understanding of the education provided. Patient demo good return demo of skill of exercises.    Assessment     Patient presents to Physical Therapy Evaluation with diagnosis of Neck and Low Back Pain, with signs and symptoms including: increased neck pain, decreased cervical ROM, decreased UE strength, soft tissue dysfunction, postural imbalance, impaired joint mobility, and decreased tolerance to functional activities. No significant pain reported in " cervical region this session, with evaluation primarily focusing on lumbar/LE pathology today per patient request.Pain located grossly throughout L LE/lumbar region, with poorly localized pain provoked throughout mobility assessment. Pt with significant strength deficits throughout B hip, knee, ankle greater deficits in L LE compared to R LE. Pt with significant impaired static/dynamic balance without AD assistance, contributing to poor lumbar ROM noted in weight-bearing. No significant signs of Sciatic N pain distribution, negative signs of neural tension with SLR or Slump Test. Pt with complete pain resolution following manual technique including manual lumbar traction, with 0/10 pain reported in supine at conclusion. Pt with good motivation to perform physical activity and responds well to cueing.      Pt prognosis is Good.  Pt will benefit from skilled outpatient physical therapy to address the above stated deficits, provide pt/family education and to maximize pt's level of independence.     Medical necessity is demonstrated by the following IMPAIRMENTS/PROBLEMS:  weakness, impaired endurance, impaired sensation, impaired self care skills, impaired functional mobility, gait instability, impaired balance, decreased coordination, decreased upper extremity function, decreased lower extremity function, decreased safety awareness, pain, abnormal tone, decreased ROM, impaired coordination, impaired joint extensibility and impaired muscle length      History  Co-morbidities and personal factors that may impact the plan of care Examination  Body Structures and Functions, activity limitations and participation restrictions that may impact the plan of care Clinical Presentation   Decision Making/ Complexity Score   Co-morbidities:     -B knee pain  -Osteoporosis  -Polyneuropathy  -Chronic Kidney Disease            Personal Factors:    Body Regions: BUE, trunk/core     Body Systems: musculoskeletal (ROM, strength,  endurance, flexibility, gait); neuromuscular (balance, posture, motor control, coordination)        Activity limitations: turning head, looking up/down, lifting, carrying, reaching, pushing/pulling, sitting       Participation Restrictions: walking, standing, stairs, lifting, bending, reaching             Stable, uncomplicated       Low Complexity      FOTO Neck Survey  Score: 58% Limitation      FOTO Lumbar Survey  Score: 60% Limitation       Pt's spiritual, cultural and educational needs considered and pt agreeable to plan of care and goals as stated below:     Short Term GOALS: 4 weeks. Pt agrees with goals set.  1. Patient demonstrates independence with HEP.   2. Patient demonstrates independence with Postural Awareness.   3. Patient demonstrates independence with body mechanics.   4. Patient will report pain of 7/10 at worst, on 0-10 pain scale, with all activity  5. Patient demonstrates increased thoracolumbar ROM to at least 50% functional limitation to improve tolerance to functional activities pain free.   6. Patient demonstrates increased strength BLE's by 1/3 muscle grade or greater to improve tolerance to functional activities pain free.     Long Term GOALS: 8 weeks. Pt agrees with goals set.  1. Patient demonstrates increased thoracolumbar ROM to WFL to improve tolerance to functional activities pain free.   2. Patient demonstrates increased strength BLE's by 4+/5 or greater to improve tolerance to functional activities pain free.   3. Patient demonstrates improved overall function per FOTO Lumbar Survey to 50% Limitation or less.   4. Patient will report pain of 4/10 at worst, on 0-10 pain scale, with all activity  5. Patient demonstrates ability to stand with narrow NORA for 30 seconds, minimal trunk sway, no significant LOB to reduce risk for falls  6. Patient demonstrates ability to walk 1/2 mile, with AD, appropriate gait pattern, no pain provocation    Functional Limitations Reports - G  Codes  Category: Mobility  Tool: FOTO Neck Survey  Score: 58% Limitation   Modifier  Impairment Limitation Restriction    CH  0 % impaired, limited or restricted    CI  @ least 1% but less than 20% impaired, limited or restricted    CJ  @ least 20%<40% impaired, limited or restricted    CK  @ least 40%<60% impaired, limited or restricted    CL  @ least 60% <80% impaired, limited or restricted    CM  @ least 80%<100% impaired limited or restricted    CN  100% impaired, limited or restricted     Current/  CK = 40-60% limitation  Goal/ : CJ =  20-40% limitation    Functional Limitations Reports - G Codes  Category: Mobility  Tool: FOTO Lumbar Survey  Score: 60% Limitation   Modifier  Impairment Limitation Restriction    CH  0 % impaired, limited or restricted    CI  @ least 1% but less than 20% impaired, limited or restricted    CJ  @ least 20%<40% impaired, limited or restricted    CK  @ least 40%<60% impaired, limited or restricted    CL  @ least 60% <80% impaired, limited or restricted    CM  @ least 80%<100% impaired limited or restricted    CN  100% impaired, limited or restricted     Current/  CL = 60-80% limitation  Goal/ : CK =  40-60% limitation    PLAN     Certification Period: 8/8/18 - 11/8/18    Outpatient physical therapy 1-2 times weekly to include: pt ed, hep, therapeutic exercises, neuromuscular re-education/ balance exercises, dry needling, manual therapy, joint mobilizations, aquatic therapy and modalities prn. Cont PT for  10-12 weeks. Pt may be seen by PTA as part of the rehabilitation team.     I have seen the patient, reviewed the therapist's plan of care, and I agree with the plan of care.      I certify the need for these services furnished under this plan of treatment and while under my care.     ___________________ ________ Physician/Referring Practitioner            ___________________________ Date of Signature

## 2018-08-08 NOTE — PROGRESS NOTES
"  Please See Full Physical Therapy Evaluation in Plan of Care                                                      Physical Therapy Initial Evaluation     Name: Regino Lawrence  [facility] Number: [id number]    Diagnosis:   Encounter Diagnoses   Name Primary?    Neck pain     Muscle weakness Yes    Chronic bilateral low back pain with left-sided sciatica     Poor motor control of trunk      Physician: Fab Conner MD  Treatment Orders: PT Eval and Treat    TREATMENT     Time In: 1000  Time Out: 1100    PT Evaluation Completed? Yes  Discussed Plan of Care with patient: Yes    Regino received 10 minutes of therapeutic exercise & instruction including:    Piriformis Str 3x30"  Post Pelvic Tilt 10x    Regino received 0 minutes of manual therapy including:    Written Home Exercises Provided: Yes - Piriformis Str, PPT  Regino demo good understanding of the education provided. Patient demo good return demo of skill of exercises.    Assessment     Pt's spiritual, cultural and educational needs considered and pt agreeable to plan of care and goals as stated below:     Short Term GOALS: 4 weeks. Pt agrees with goals set.  1. Patient demonstrates independence with HEP.   2. Patient demonstrates independence with Postural Awareness.   3. Patient demonstrates independence with body mechanics.   4. Patient will report pain of 7/10 at worst, on 0-10 pain scale, with all activity  5. Patient demonstrates increased thoracolumbar ROM to at least 50% functional limitation to improve tolerance to functional activities pain free.   6. Patient demonstrates increased strength BLE's by 1/3 muscle grade or greater to improve tolerance to functional activities pain free.     Long Term GOALS: 8 weeks. Pt agrees with goals set.  1. Patient demonstrates increased thoracolumbar ROM to WFL to improve tolerance to functional activities pain free.   2. Patient demonstrates increased strength BLE's by 4+/5 or greater to improve tolerance to " functional activities pain free.   3. Patient demonstrates improved overall function per FOTO Lumbar Survey to 50% Limitation or less.   4. Patient will report pain of 4/10 at worst, on 0-10 pain scale, with all activity  5. Patient demonstrates ability to stand with narrow NORA for 30 seconds, minimal trunk sway, no significant LOB to reduce risk for falls  6. Patient demonstrates ability to walk 1/2 mile, with AD, appropriate gait pattern, no pain provocation    Functional Limitations Reports - G Codes  Category: Mobility  Tool: FOTO Neck Survey  Score: 58% Limitation   Modifier  Impairment Limitation Restriction    CH  0 % impaired, limited or restricted    CI  @ least 1% but less than 20% impaired, limited or restricted    CJ  @ least 20%<40% impaired, limited or restricted    CK  @ least 40%<60% impaired, limited or restricted    CL  @ least 60% <80% impaired, limited or restricted    CM  @ least 80%<100% impaired limited or restricted    CN  100% impaired, limited or restricted     Current/  CK = 40-60% limitation  Goal/ : CJ =  20-40% limitation    Functional Limitations Reports - G Codes  Category: Mobility  Tool: FOTO Lumbar Survey  Score: 60% Limitation   Modifier  Impairment Limitation Restriction    CH  0 % impaired, limited or restricted    CI  @ least 1% but less than 20% impaired, limited or restricted    CJ  @ least 20%<40% impaired, limited or restricted    CK  @ least 40%<60% impaired, limited or restricted    CL  @ least 60% <80% impaired, limited or restricted    CM  @ least 80%<100% impaired limited or restricted    CN  100% impaired, limited or restricted     Current/  CL = 60-80% limitation  Goal/ : CK =  40-60% limitation    PLAN     Certification Period: 8/8/18 - 11/8/18    Outpatient physical therapy 1-2 times weekly to include: pt ed, hep, therapeutic exercises, neuromuscular re-education/ balance exercises, dry needling, manual therapy, joint mobilizations, aquatic  therapy and modalities prn. Cont PT for  10-12 weeks. Pt may be seen by PTA as part of the rehabilitation team.     I have seen the patient, reviewed the therapist's plan of care, and I agree with the plan of care.      I certify the need for these services furnished under this plan of treatment and while under my care.     ___________________ ________ Physician/Referring Practitioner            ___________________________ Date of Signature

## 2018-08-09 ENCOUNTER — LAB VISIT (OUTPATIENT)
Dept: LAB | Facility: HOSPITAL | Age: 73
End: 2018-08-09
Attending: MEDICAL GENETICS
Payer: MEDICARE

## 2018-08-09 DIAGNOSIS — Z84.89 FAMILY HISTORY OF GENETIC DISEASE: ICD-10-CM

## 2018-08-09 DIAGNOSIS — Z84.89 FAMILY HISTORY OF GENETIC DISEASE: Primary | ICD-10-CM

## 2018-08-09 PROCEDURE — 36415 COLL VENOUS BLD VENIPUNCTURE: CPT | Mod: PO

## 2018-08-10 ENCOUNTER — INFUSION (OUTPATIENT)
Dept: INFUSION THERAPY | Facility: HOSPITAL | Age: 73
End: 2018-08-10
Attending: INTERNAL MEDICINE
Payer: MEDICARE

## 2018-08-10 VITALS
SYSTOLIC BLOOD PRESSURE: 154 MMHG | OXYGEN SATURATION: 97 % | DIASTOLIC BLOOD PRESSURE: 77 MMHG | TEMPERATURE: 99 F | HEART RATE: 101 BPM | RESPIRATION RATE: 17 BRPM

## 2018-08-10 DIAGNOSIS — N18.9 ANEMIA IN CHRONIC KIDNEY DISEASE, UNSPECIFIED CKD STAGE: Primary | ICD-10-CM

## 2018-08-10 DIAGNOSIS — Z53.1 REFUSAL OF BLOOD TRANSFUSIONS AS PATIENT IS JEHOVAH'S WITNESS: ICD-10-CM

## 2018-08-10 DIAGNOSIS — D63.1 ANEMIA IN CHRONIC KIDNEY DISEASE, UNSPECIFIED CKD STAGE: Primary | ICD-10-CM

## 2018-08-10 PROCEDURE — 96372 THER/PROPH/DIAG INJ SC/IM: CPT

## 2018-08-10 PROCEDURE — 63600175 PHARM REV CODE 636 W HCPCS: Mod: JG | Performed by: INTERNAL MEDICINE

## 2018-08-10 RX ADMIN — ERYTHROPOIETIN 20000 UNITS: 20000 INJECTION, SOLUTION INTRAVENOUS; SUBCUTANEOUS at 11:08

## 2018-08-15 ENCOUNTER — LAB VISIT (OUTPATIENT)
Dept: LAB | Facility: OTHER | Age: 73
End: 2018-08-15
Attending: OBSTETRICS & GYNECOLOGY
Payer: MEDICARE

## 2018-08-15 ENCOUNTER — OFFICE VISIT (OUTPATIENT)
Dept: UROGYNECOLOGY | Facility: CLINIC | Age: 73
End: 2018-08-15
Payer: MEDICARE

## 2018-08-15 VITALS
BODY MASS INDEX: 22.48 KG/M2 | HEIGHT: 62 IN | DIASTOLIC BLOOD PRESSURE: 60 MMHG | SYSTOLIC BLOOD PRESSURE: 140 MMHG | WEIGHT: 122.13 LBS

## 2018-08-15 DIAGNOSIS — N83.202 LEFT OVARIAN CYST: ICD-10-CM

## 2018-08-15 DIAGNOSIS — N83.202 LEFT OVARIAN CYST: Primary | ICD-10-CM

## 2018-08-15 DIAGNOSIS — R19.09 OTHER INTRA-ABDOMINAL AND PELVIC SWELLING, MASS AND LUMP: ICD-10-CM

## 2018-08-15 LAB — CANCER AG125 SERPL-ACNC: 19 U/ML

## 2018-08-15 PROCEDURE — 3077F SYST BP >= 140 MM HG: CPT | Mod: CPTII,S$GLB,, | Performed by: OBSTETRICS & GYNECOLOGY

## 2018-08-15 PROCEDURE — 99213 OFFICE O/P EST LOW 20 MIN: CPT | Mod: S$GLB,,, | Performed by: OBSTETRICS & GYNECOLOGY

## 2018-08-15 PROCEDURE — 3078F DIAST BP <80 MM HG: CPT | Mod: CPTII,S$GLB,, | Performed by: OBSTETRICS & GYNECOLOGY

## 2018-08-15 PROCEDURE — 86304 IMMUNOASSAY TUMOR CA 125: CPT

## 2018-08-15 PROCEDURE — 36415 COLL VENOUS BLD VENIPUNCTURE: CPT

## 2018-08-15 PROCEDURE — 99999 PR PBB SHADOW E&M-EST. PATIENT-LVL IV: CPT | Mod: PBBFAC,,, | Performed by: OBSTETRICS & GYNECOLOGY

## 2018-08-15 NOTE — PROGRESS NOTES
[facility] [address] [place] [address]    Regino Lawrence  [id number]  1945      Regino Lawrence is a 72 y.o.  here for follow up doing well, no complaints of incontinence or prolapse.      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, without long-term current use of insulin     Depression     Diabetes mellitus, type 2     Eye injury as a child     k-abrasion  od    Hyperlipidemia     Hypertension     Hypothyroidism     Immune deficiency disorder     Immune disorder     Myalgia and myositis 2012    Osteoporosis     Polyneuropathy     Renal manifestation of secondary diabetes mellitus     Sarcoidosis     Ulcer     no cancer    Urinary incontinence        Past Surgical History:   Procedure Laterality Date    CARPAL TUNNEL RELEASE      Rt wrist    CATARACT EXTRACTION W/  INTRAOCULAR LENS IMPLANT Right 2015    Dr. Azevedo    CATARACT EXTRACTION W/  INTRAOCULAR LENS IMPLANT Left 2015    Dr. Azevedo     SECTION      CHOLECYSTECTOMY      TUBAL LIGATION       History since last visit: Denies pain, bleeding, or discharge.  Bladder issues: Denies GISSELLE.  Rare UUI if waits to use the restroom.   Bowel issues: Denies constipation or straining.    Current Outpatient Medications   Medication Sig    ACCU-CHEK FASTCLIX Kit USE AS DIRECTED.    ALCOHOL ANTISEPTIC PADS (ALCOHOL PREP PADS TOP)     alcohol antiseptic pads (ALCOHOL PREP PADS TOP)     blood sugar diagnostic Strp 1 each by Misc.(Non-Drug; Combo Route) route once daily.     blood-glucose meter kit Use as instructed    calcium carbonate (OS-MALCOLM) 600 mg calcium (1,500 mg) Tab Take 1 tablet by mouth 2 (two) times daily.     carvedilol (COREG) 12.5 MG tablet Take 1 tablet (12.5 mg total) by mouth 2 (two) times daily.    cloNIDine (CATAPRES) 0.3 MG tablet Take 1 tablet (0.3 mg total) by mouth 3 (three) times daily.    cyclobenzaprine (FLEXERIL) 10 MG tablet Take 1 tablet (10 mg total) by mouth 3 (three) times daily as needed for Muscle spasms.    epoetin german (PROCRIT INJ) Inject 1,000 Units as directed.    fenofibrate 160 MG Tab Take 1 tablet (160 mg total) by mouth once daily.    fexofenadine (ALLEGRA ALLERGY) 180 MG tablet Take 180 mg by mouth daily as needed.     HYDROcodone-acetaminophen (NORCO) 5-325 mg per tablet Take 1 tablet by mouth every 6 (six) hours as needed.    lancing device (ACCU-CHEK SOFTCLIX LANCET DEV) Misc Accu-chek FastClix kit    levothyroxine (SYNTHROID) 50 MCG tablet TAKE 1 TABLET (50 MCG TOTAL) BY MOUTH BEFORE BREAKFAST.    LUBRICANT EYE DROPS, GLYC-PG, 1-0.3 % Drop     metFORMIN (GLUCOPHAGE) 1000 MG tablet Take 1 tablet (1,000 mg total) by mouth 2 (two) times daily with meals.    methotrexate 2.5 MG Tab Take 4 tablets (10 mg total) by mouth every 7 days.    ondansetron (ZOFRAN) 8 MG tablet Take 1 tablet (8 mg total) by mouth every 8 (eight) hours as needed for Nausea.    potassium chloride SA (K-DUR,KLOR-CON) 20 MEQ tablet Take 1 tablet (20 mEq total) by mouth 2 (two) times daily.    predniSONE (DELTASONE) 10 MG tablet Take 1 tablet (10 mg total) by mouth once daily.    amLODIPine (NORVASC) 10 MG tablet Take 0.5 tablets (5 mg total) by mouth once daily.    folic acid (FOLVITE) 1 MG tablet Take 1 tablet (1 mg total) by mouth once daily.    gabapentin (NEURONTIN) 400 MG capsule Take 1 capsule (400 mg total) by mouth 2 (two) times daily. (Patient taking differently: Take 400 mg by mouth once daily. )     No current facility-administered medications  "for this visit.      ROS:  As per HPI.      Exam  BP (!) 140/60 (BP Location: Right arm, Patient Position: Sitting, BP Method: Medium (Manual))   Ht 5' 2" (1.575 m)   Wt 55.4 kg (122 lb 2.2 oz)   LMP  (LMP Unknown)   BMI 22.34 kg/m²   General: alert and oriented, no acute distress  Respiratory: normal respiratory effort  Abd: soft, non-tender, non-distended    Pelvic  Ext. Genitalia: normal external genitalia. Normal bartholin's and skeens glands  Vagina: + atrophy. No discharge noted.   Non-tender bladder base without palpable mass. S/p colpocleisis channels patent.  Well healed.   Urethra: no masses or tenderness  Urethral meatus: no lesions, caruncle or prolapse.    POP-Q:  No obvious prolapse,      TVS: 7/17/2017 Findings: The uterus measures 5.5 cm x 2.6 cm x 3.5 cm.The uterus is anteflexed. No uterine masses. The endometrium measures approximately 1 mm. The ovaries are not identified on this exam.  0.7 cm anechoic focus is identified in the left adnexa.  This focus does not have surrounding suspicious features and does not have increased vascularity.  No adnexal abnormalities identified    Impression  1. Left ovarian cyst  US Pelvis Comp with Transvag NON-OB (xpd    CANCER ANTIGEN 125   2. Other intra-abdominal and pelvic swelling, mass and lump   CANCER ANTIGEN 125          We reviewed the above issues and discussed options for short-term versus long-term management of her problems.       Plan:   1. Cystic structure within the left adnexa-- repeat ultrasound       20 minutes were spent in face to face time with this patient  75 % of this time was spent in counseling and/or coordination of care    Meredith Becker DO  Female Pelvic Medicine and Reconstructive Surgery  [facility] [place], [place]    "

## 2018-08-16 ENCOUNTER — PATIENT MESSAGE (OUTPATIENT)
Dept: UROGYNECOLOGY | Facility: CLINIC | Age: 73
End: 2018-08-16

## 2018-08-17 ENCOUNTER — PATIENT MESSAGE (OUTPATIENT)
Dept: UROGYNECOLOGY | Facility: CLINIC | Age: 73
End: 2018-08-17

## 2018-08-20 ENCOUNTER — CLINICAL SUPPORT (OUTPATIENT)
Dept: REHABILITATION | Facility: HOSPITAL | Age: 73
End: 2018-08-20
Attending: NEUROLOGICAL SURGERY
Payer: MEDICARE

## 2018-08-20 DIAGNOSIS — R29.818 POOR MOTOR CONTROL OF TRUNK: ICD-10-CM

## 2018-08-20 DIAGNOSIS — G89.29 CHRONIC BILATERAL LOW BACK PAIN WITH LEFT-SIDED SCIATICA: ICD-10-CM

## 2018-08-20 DIAGNOSIS — M54.2 NECK PAIN: ICD-10-CM

## 2018-08-20 DIAGNOSIS — M62.81 MUSCLE WEAKNESS: Primary | ICD-10-CM

## 2018-08-20 DIAGNOSIS — M54.42 CHRONIC BILATERAL LOW BACK PAIN WITH LEFT-SIDED SCIATICA: ICD-10-CM

## 2018-08-20 PROCEDURE — 97110 THERAPEUTIC EXERCISES: CPT | Mod: PN

## 2018-08-20 NOTE — PROGRESS NOTES
".                                                    Physical Therapy Daily Note     Name: Regino Lawrence  Clinic Number: [id number]  Diagnosis:   Encounter Diagnoses   Name Primary?    Neck pain     Muscle weakness Yes    Chronic bilateral low back pain with left-sided sciatica     Poor motor control of trunk      Physician: Fab Conner MD  Precautions: Fall; Cerv Fusion 2015  Visit #: 2 of 20  FAUSTIN: 12/31/18    Time In: 1:45pm  Time Out: 2:30pm  Total Treatment Time 1:1: 45 minutes (30 min 1:1)    Subjective     Pt reports: She felt good after last session, however today the pain is severe.   Pain Scale: Regino rates pain on a scale of 0-10 to be 10 currently.    Objective     Regino received individual therapeutic exercises to develop strength, endurance, ROM, flexibility, posture and core stabilization for 35 minutes including:    Piriformis Str 3x30" ea  Post Pelvic Tilt  2x10  HS Str c/ strap 3x30" ea  LTR c/ ball 2x10 ea  DKTC c/ ball 2x10    Supine Cervical Rotation AROM 20x ea   Supine Chin Tuck 3x10  Supine White Wand Flexion 2x10     Regino received the following manual therapy techniques: Manual traction and Soft tissue Mobilization were applied to the: Lumbar/Cervical Spine for 10 minutes including:   Cervical Distraction  Facilitated Chin Tuck  Suboccipital Release    Written Home Exercises Provided: Reviewed - Piriformis Str, PPT  Pt demo good understanding of the education provided. Regino demonstrated good return demonstration of activities.     PT/PTA face to face conference performed during this session    Assessment     Patient tolerated treatment session very well. Pt with highly irritable pain at initiation of session, with no significant pain exacerbation as session progressed, no significant neural tension noted with HS stretching. Heavy cueing provided for proper chin retraction movement pattern; significant forward head/flexed neck posture noted in standing/walking.    This is a " 72 y.o. female referred to outpatient physical therapy and presents with a medical diagnosis of Neck and Low Back Pain and demonstrates limitations as described in the problem list. Pt prognosis is Good. Pt will continue to benefit from skilled outpatient physical therapy to address the deficits listed in the problem list, provide pt/family education and to maximize pt's level of independence in the home and community environment.     Goals as follows:  Short Term GOALS: 4 weeks. Pt agrees with goals set.  1. Patient demonstrates independence with HEP.   2. Patient demonstrates independence with Postural Awareness.   3. Patient demonstrates independence with body mechanics.   4. Patient will report pain of 7/10 at worst, on 0-10 pain scale, with all activity  5. Patient demonstrates increased thoracolumbar ROM to at least 50% functional limitation to improve tolerance to functional activities pain free.   6. Patient demonstrates increased strength BLE's by 1/3 muscle grade or greater to improve tolerance to functional activities pain free.      Plan     Certification Period: 8/8/18 - 11/8/18    Continue with established Plan of Care towards PT goals.    Therapist: Thomas Noguera, PT  8/20/2018

## 2018-08-24 ENCOUNTER — LAB VISIT (OUTPATIENT)
Dept: LAB | Facility: HOSPITAL | Age: 73
End: 2018-08-24
Attending: INTERNAL MEDICINE
Payer: MEDICARE

## 2018-08-24 DIAGNOSIS — N18.9 ANEMIA IN CHRONIC KIDNEY DISEASE, UNSPECIFIED CKD STAGE: ICD-10-CM

## 2018-08-24 DIAGNOSIS — D63.1 ANEMIA IN CHRONIC KIDNEY DISEASE, UNSPECIFIED CKD STAGE: ICD-10-CM

## 2018-08-24 LAB
BASOPHILS # BLD AUTO: 0.02 K/UL
BASOPHILS NFR BLD: 0.3 %
DIFFERENTIAL METHOD: ABNORMAL
EOSINOPHIL # BLD AUTO: 0.1 K/UL
EOSINOPHIL NFR BLD: 0.9 %
ERYTHROCYTE [DISTWIDTH] IN BLOOD BY AUTOMATED COUNT: 14 %
HCT VFR BLD AUTO: 34.2 %
HGB BLD-MCNC: 11 G/DL
LYMPHOCYTES # BLD AUTO: 2 K/UL
LYMPHOCYTES NFR BLD: 30.9 %
MCH RBC QN AUTO: 32.1 PG
MCHC RBC AUTO-ENTMCNC: 32.2 G/DL
MCV RBC AUTO: 100 FL
MONOCYTES # BLD AUTO: 0.5 K/UL
MONOCYTES NFR BLD: 8.4 %
NEUTROPHILS # BLD AUTO: 3.7 K/UL
NEUTROPHILS NFR BLD: 59 %
PLATELET # BLD AUTO: 354 K/UL
PMV BLD AUTO: 8.4 FL
RBC # BLD AUTO: 3.43 M/UL
WBC # BLD AUTO: 6.34 K/UL

## 2018-08-24 PROCEDURE — 36415 COLL VENOUS BLD VENIPUNCTURE: CPT

## 2018-08-24 PROCEDURE — 85025 COMPLETE CBC W/AUTO DIFF WBC: CPT

## 2018-08-27 ENCOUNTER — HOSPITAL ENCOUNTER (OUTPATIENT)
Dept: RADIOLOGY | Facility: HOSPITAL | Age: 73
Discharge: HOME | End: 2018-08-27
Attending: OBSTETRICS & GYNECOLOGY
Payer: MEDICARE

## 2018-08-27 DIAGNOSIS — N83.202 LEFT OVARIAN CYST: ICD-10-CM

## 2018-08-27 PROCEDURE — 76830 TRANSVAGINAL US NON-OB: CPT | Mod: TC

## 2018-08-27 PROCEDURE — 76856 US EXAM PELVIC COMPLETE: CPT | Mod: 26,,, | Performed by: INTERNAL MEDICINE

## 2018-08-27 PROCEDURE — 76830 TRANSVAGINAL US NON-OB: CPT | Mod: 26,,, | Performed by: INTERNAL MEDICINE

## 2018-08-28 ENCOUNTER — PATIENT MESSAGE (OUTPATIENT)
Dept: RHEUMATOLOGY | Facility: CLINIC | Age: 73
End: 2018-08-28

## 2018-08-28 DIAGNOSIS — G72.9 MYOPATHY: ICD-10-CM

## 2018-08-28 DIAGNOSIS — D86.9 SARCOIDOSIS: Chronic | ICD-10-CM

## 2018-08-29 ENCOUNTER — PATIENT MESSAGE (OUTPATIENT)
Dept: RHEUMATOLOGY | Facility: CLINIC | Age: 73
End: 2018-08-29

## 2018-08-29 ENCOUNTER — CLINICAL SUPPORT (OUTPATIENT)
Dept: REHABILITATION | Facility: HOSPITAL | Age: 73
End: 2018-08-29
Attending: NEUROLOGICAL SURGERY
Payer: MEDICARE

## 2018-08-29 DIAGNOSIS — M62.81 MUSCLE WEAKNESS: Primary | ICD-10-CM

## 2018-08-29 DIAGNOSIS — M54.42 CHRONIC BILATERAL LOW BACK PAIN WITH LEFT-SIDED SCIATICA: ICD-10-CM

## 2018-08-29 DIAGNOSIS — R29.818 POOR MOTOR CONTROL OF TRUNK: ICD-10-CM

## 2018-08-29 DIAGNOSIS — M54.2 NECK PAIN: ICD-10-CM

## 2018-08-29 DIAGNOSIS — G89.29 CHRONIC BILATERAL LOW BACK PAIN WITH LEFT-SIDED SCIATICA: ICD-10-CM

## 2018-08-29 PROCEDURE — 97110 THERAPEUTIC EXERCISES: CPT | Mod: PN

## 2018-08-29 RX ORDER — METHOTREXATE 2.5 MG/1
10 TABLET ORAL
Qty: 48 TABLET | Refills: 0 | Status: ON HOLD | OUTPATIENT
Start: 2018-08-29 | End: 2018-10-08 | Stop reason: HOSPADM

## 2018-08-29 NOTE — PROGRESS NOTES
".                                                    Physical Therapy Daily Note     Name: Regino Lawrence  [facility] Number: [id number]  Diagnosis:   Encounter Diagnoses   Name Primary?   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    Objective     Regino received individual therapeutic exercises to develop strength, endurance, ROM, flexibility, posture and core stabilization for 44 minutes including:    Piriformis Str 3x30" ea  +SKTC 3x30" each  Post Pelvic Tilt  3x10  HS Str c/ strap 3x30" ea  LTR c/ ball 3x10 ea  DKTC c/ ball 2x10    Supine Cervical Rotation AROM 20x ea   Supine Chin Tuck 3x10  Supine White Wand Flexion 2x10  +Supine scapular protraction 2x10    Seated at EOM:   Scapular Retraction with YTB x20     Regino received the following manual therapy techniques: Manual traction and Soft tissue Mobilization were applied to the: Lumbar/Cervical Spine for 10 minutes including:   Cervical Distraction (NP)  Facilitated Chin Tuck (NP)  Suboccipital Release, Manual cervical stretches, STM/MFR to suboccipitals, levator and upper trap musculature x12 minutes.    Written Home Exercises Provided: Reviewed - Piriformis Str, PPT  Pt demo good understanding of the education provided. Regino demonstrated good return demonstration of activities.         Assessment   Pt tolerated treatment session good today. Challenged with new therex today, with no adverse reactions. Pt would benefit from further scapular strengthening exercises. Pt is responding positively to current treatment plan with decreased " symptoms.    This is a 72 y.o. female referred to outpatient physical therapy and presents with a medical diagnosis of Neck and Low Back Pain and demonstrates limitations as described in the problem list. Pt prognosis is Good. Pt will continue to benefit from skilled outpatient physical therapy to address the deficits listed in the problem list, provide pt/family education and to maximize pt's level of independence in the home and community environment.     Goals as follows:  Short Term GOALS: 4 weeks. Pt agrees with goals set.  1. Patient demonstrates independence with HEP.   2. Patient demonstrates independence with Postural Awareness.   3. Patient demonstrates independence with body mechanics.   4. Patient will report pain of 7/10 at worst, on 0-10 pain scale, with all activity  5. Patient demonstrates increased thoracolumbar ROM to at least 50% functional limitation to improve tolerance to functional activities pain free.   6. Patient demonstrates increased strength BLE's by 1/3 muscle grade or greater to improve tolerance to functional activities pain free.      Plan     Certification Period: 8/8/18 - 11/8/18    Continue with established Plan of Care towards PT goals.    Therapist: Faith Diaz, PTA  8/29/2018

## 2018-09-05 ENCOUNTER — DOCUMENTATION ONLY (OUTPATIENT)
Dept: REHABILITATION | Facility: HOSPITAL | Age: 73
End: 2018-09-05

## 2018-09-05 NOTE — PROGRESS NOTES
Missed Visit/Cancellation     Date: 09/5/18    Canceled Number: 1             Pt initially had visit scheduled for today at 11AM.  Pt called office and cancelled appointment 2/2 to not feeling well.

## 2018-09-07 ENCOUNTER — PATIENT MESSAGE (OUTPATIENT)
Dept: RHEUMATOLOGY | Facility: CLINIC | Age: 73
End: 2018-09-07

## 2018-09-07 ENCOUNTER — LAB VISIT (OUTPATIENT)
Dept: LAB | Facility: HOSPITAL | Age: 73
End: 2018-09-07
Attending: INTERNAL MEDICINE
Payer: MEDICARE

## 2018-09-07 DIAGNOSIS — N18.9 ANEMIA IN CHRONIC KIDNEY DISEASE, UNSPECIFIED CKD STAGE: ICD-10-CM

## 2018-09-07 DIAGNOSIS — D63.1 ANEMIA IN CHRONIC KIDNEY DISEASE, UNSPECIFIED CKD STAGE: ICD-10-CM

## 2018-09-07 LAB
BASOPHILS # BLD AUTO: 0.01 K/UL
BASOPHILS NFR BLD: 0.2 %
DIFFERENTIAL METHOD: ABNORMAL
EOSINOPHIL # BLD AUTO: 0.1 K/UL
EOSINOPHIL NFR BLD: 1.6 %
ERYTHROCYTE [DISTWIDTH] IN BLOOD BY AUTOMATED COUNT: 13.9 %
HCT VFR BLD AUTO: 33.4 %
HGB BLD-MCNC: 11.2 G/DL
LYMPHOCYTES # BLD AUTO: 2.1 K/UL
LYMPHOCYTES NFR BLD: 37.8 %
MCH RBC QN AUTO: 33.2 PG
MCHC RBC AUTO-ENTMCNC: 33.5 G/DL
MCV RBC AUTO: 99 FL
MONOCYTES # BLD AUTO: 0.4 K/UL
MONOCYTES NFR BLD: 7.1 %
NEUTROPHILS # BLD AUTO: 3 K/UL
NEUTROPHILS NFR BLD: 53.3 %
PLATELET # BLD AUTO: 311 K/UL
PMV BLD AUTO: 8.9 FL
RBC # BLD AUTO: 3.37 M/UL
WBC # BLD AUTO: 5.66 K/UL

## 2018-09-07 PROCEDURE — 36415 COLL VENOUS BLD VENIPUNCTURE: CPT

## 2018-09-07 PROCEDURE — 85025 COMPLETE CBC W/AUTO DIFF WBC: CPT

## 2018-09-10 ENCOUNTER — LAB VISIT (OUTPATIENT)
Dept: LAB | Facility: HOSPITAL | Age: 73
End: 2018-09-10
Attending: INTERNAL MEDICINE
Payer: MEDICARE

## 2018-09-10 DIAGNOSIS — D86.9 SARCOIDOSIS: ICD-10-CM

## 2018-09-10 DIAGNOSIS — M79.10 MYALGIA: ICD-10-CM

## 2018-09-10 LAB
ALBUMIN SERPL BCP-MCNC: 3.4 G/DL
ALP SERPL-CCNC: 52 U/L
ALT SERPL W/O P-5'-P-CCNC: 11 U/L
ANION GAP SERPL CALC-SCNC: 10 MMOL/L
AST SERPL-CCNC: 14 U/L
BASOPHILS # BLD AUTO: 0.03 K/UL
BASOPHILS NFR BLD: 0.6 %
BILIRUB SERPL-MCNC: 0.3 MG/DL
BUN SERPL-MCNC: 17 MG/DL
CALCIUM SERPL-MCNC: 9.1 MG/DL
CHLORIDE SERPL-SCNC: 102 MMOL/L
CK SERPL-CCNC: 114 U/L
CO2 SERPL-SCNC: 27 MMOL/L
CREAT SERPL-MCNC: 1.1 MG/DL
CRP SERPL-MCNC: 3.6 MG/L
DIFFERENTIAL METHOD: ABNORMAL
EOSINOPHIL # BLD AUTO: 0.1 K/UL
EOSINOPHIL NFR BLD: 1.5 %
ERYTHROCYTE [DISTWIDTH] IN BLOOD BY AUTOMATED COUNT: 13.9 %
ERYTHROCYTE [SEDIMENTATION RATE] IN BLOOD BY WESTERGREN METHOD: 10 MM/HR
EST. GFR  (AFRICAN AMERICAN): 58 ML/MIN/1.73 M^2
EST. GFR  (NON AFRICAN AMERICAN): 50.3 ML/MIN/1.73 M^2
GLUCOSE SERPL-MCNC: 124 MG/DL
HCT VFR BLD AUTO: 33.5 %
HGB BLD-MCNC: 10.8 G/DL
IMM GRANULOCYTES # BLD AUTO: 0.05 K/UL
IMM GRANULOCYTES NFR BLD AUTO: 1 %
LYMPHOCYTES # BLD AUTO: 1.3 K/UL
LYMPHOCYTES NFR BLD: 27.9 %
MCH RBC QN AUTO: 32.7 PG
MCHC RBC AUTO-ENTMCNC: 32.2 G/DL
MCV RBC AUTO: 102 FL
MONOCYTES # BLD AUTO: 0.5 K/UL
MONOCYTES NFR BLD: 11 %
NEUTROPHILS # BLD AUTO: 2.8 K/UL
NEUTROPHILS NFR BLD: 58 %
NRBC BLD-RTO: 0 /100 WBC
PLATELET # BLD AUTO: 278 K/UL
PMV BLD AUTO: 9.6 FL
POTASSIUM SERPL-SCNC: 3.5 MMOL/L
PROT SERPL-MCNC: 6.2 G/DL
RBC # BLD AUTO: 3.3 M/UL
SODIUM SERPL-SCNC: 139 MMOL/L
WBC # BLD AUTO: 4.81 K/UL

## 2018-09-10 PROCEDURE — 36415 COLL VENOUS BLD VENIPUNCTURE: CPT | Mod: PO

## 2018-09-10 PROCEDURE — 85652 RBC SED RATE AUTOMATED: CPT

## 2018-09-10 PROCEDURE — 85025 COMPLETE CBC W/AUTO DIFF WBC: CPT

## 2018-09-10 PROCEDURE — 86140 C-REACTIVE PROTEIN: CPT

## 2018-09-10 PROCEDURE — 82085 ASSAY OF ALDOLASE: CPT

## 2018-09-10 PROCEDURE — 80053 COMPREHEN METABOLIC PANEL: CPT

## 2018-09-10 PROCEDURE — 82550 ASSAY OF CK (CPK): CPT

## 2018-09-11 ENCOUNTER — PATIENT MESSAGE (OUTPATIENT)
Dept: RHEUMATOLOGY | Facility: CLINIC | Age: 73
End: 2018-09-11

## 2018-09-11 ENCOUNTER — CLINICAL SUPPORT (OUTPATIENT)
Dept: REHABILITATION | Facility: HOSPITAL | Age: 73
End: 2018-09-11
Attending: NEUROLOGICAL SURGERY
Payer: MEDICARE

## 2018-09-11 DIAGNOSIS — M62.81 MUSCLE WEAKNESS: Primary | ICD-10-CM

## 2018-09-11 DIAGNOSIS — M54.2 NECK PAIN: ICD-10-CM

## 2018-09-11 DIAGNOSIS — G89.29 CHRONIC BILATERAL LOW BACK PAIN WITH LEFT-SIDED SCIATICA: ICD-10-CM

## 2018-09-11 DIAGNOSIS — R29.818 POOR MOTOR CONTROL OF TRUNK: ICD-10-CM

## 2018-09-11 DIAGNOSIS — M54.42 CHRONIC BILATERAL LOW BACK PAIN WITH LEFT-SIDED SCIATICA: ICD-10-CM

## 2018-09-11 PROCEDURE — 97110 THERAPEUTIC EXERCISES: CPT | Mod: PN

## 2018-09-11 NOTE — PROGRESS NOTES
".                                                    Physical Therapy Daily Note     Name: Regino Lawrence  [facility] Number: [id number]  Diagnosis:   Encounter Diagnoses   Name Primary?    Neck pain     Muscle weakness Yes    Chronic bilateral low back pain with left-sided sciatica     Poor motor control of trunk      Physician: Fab Conner MD  Precautions: Fall; Cerv Fusion 2015  Visit #: 4 of 20  FAUSTIN: 12/31/18    Time In: 1104  Time Out: 1200  Total Treatment: 56 minutes (1:1 with PT for 56 minutes)    Subjective     Pt reports: Is feeling well overall. States that future lumbar surgery may be required, and would like to build core strength.  Pain Scale: Regino rates pain on a scale of 0-10 to be 0 currently.    Objective     Regino received individual therapeutic exercises to develop strength, endurance, ROM, flexibility, posture and core stabilization for 44 minutes including:    Seated at EOM:  Scap Retract 2x10  Seated Rows with YTB 3x10    Piriformis Str 2x30" ea  +SKTC 3x30" each  Post Pelvic Tilt  3x10  HS Str c/ strap 3x30" ea  LTR c/ ball 3x10 ea  DKTC c/ ball 2x10  +TrA Brace c/purse lipped breathing 2x10    Supine Cervical Rotation AROM 20x ea   Supine Chin Tuck 3x10  Supine White Wand Flexion 2x10  Supine scapular protraction 2x10    Regino received the following manual therapy techniques: Manual traction and Soft tissue Mobilization were applied to the: Lumbar/Cervical Spine for 10 minutes including:   Cervical Distraction (NP)  Facilitated Chin Tuck (NP)  Suboccipital Release, Manual cervical stretches, STM/MFR to suboccipitals, levator and upper trap musculature x12 minutes.    Written Home Exercises Provided: Reviewed - Piriformis Str, PPT  Pt demo good understanding of the education provided. Regino demonstrated good return demonstration of activities.     Assessment     Pt tolerated treatment session good today. No provocation of pain throughout entire treatment session. Heavy cueing for " increased core activation with breathing sequencing, globally accessory muscle activation due to decreased motor control. Pt prepared for progression of lumbar stabilization activities next session.    This is a 72 y.o. female referred to outpatient physical therapy and presents with a medical diagnosis of Neck and Low Back Pain and demonstrates limitations as described in the problem list. Pt prognosis is Good. Pt will continue to benefit from skilled outpatient physical therapy to address the deficits listed in the problem list, provide pt/family education and to maximize pt's level of independence in the home and community environment.     Goals as follows:  Short Term GOALS: 4 weeks. Pt agrees with goals set.  1. Patient demonstrates independence with HEP.   2. Patient demonstrates independence with Postural Awareness.   3. Patient demonstrates independence with body mechanics.   4. Patient will report pain of 7/10 at worst, on 0-10 pain scale, with all activity  5. Patient demonstrates increased thoracolumbar ROM to at least 50% functional limitation to improve tolerance to functional activities pain free.   6. Patient demonstrates increased strength BLE's by 1/3 muscle grade or greater to improve tolerance to functional activities pain free.      Plan     Certification Period: 8/8/18 - 11/8/18    Continue with established Plan of Care towards PT goals.    Therapist: Thomas Noguera, PT  9/11/2018

## 2018-09-12 ENCOUNTER — OFFICE VISIT (OUTPATIENT)
Dept: FAMILY MEDICINE | Facility: CLINIC | Age: 73
End: 2018-09-12
Payer: MEDICARE

## 2018-09-12 ENCOUNTER — HOSPITAL ENCOUNTER (OUTPATIENT)
Dept: RADIOLOGY | Facility: HOSPITAL | Age: 73
Discharge: HOME | End: 2018-09-12
Attending: NEUROLOGICAL SURGERY
Payer: MEDICARE

## 2018-09-12 VITALS
RESPIRATION RATE: 16 BRPM | SYSTOLIC BLOOD PRESSURE: 160 MMHG | TEMPERATURE: 99 F | HEIGHT: 60 IN | WEIGHT: 125 LBS | HEART RATE: 105 BPM | OXYGEN SATURATION: 98 % | BODY MASS INDEX: 24.54 KG/M2 | DIASTOLIC BLOOD PRESSURE: 98 MMHG

## 2018-09-12 DIAGNOSIS — M51.36 DEGENERATIVE DISC DISEASE, LUMBAR: ICD-10-CM

## 2018-09-12 DIAGNOSIS — N18.30 DIABETES MELLITUS WITH STAGE 3 CHRONIC KIDNEY DISEASE: Chronic | ICD-10-CM

## 2018-09-12 DIAGNOSIS — M54.42 CHRONIC BILATERAL LOW BACK PAIN WITH LEFT-SIDED SCIATICA: Primary | ICD-10-CM

## 2018-09-12 DIAGNOSIS — G62.9 POLYNEUROPATHY: ICD-10-CM

## 2018-09-12 DIAGNOSIS — M54.40 LOW BACK PAIN WITH SCIATICA, SCIATICA LATERALITY UNSPECIFIED, UNSPECIFIED BACK PAIN LATERALITY, UNSPECIFIED CHRONICITY: ICD-10-CM

## 2018-09-12 DIAGNOSIS — E11.22 DIABETES MELLITUS WITH STAGE 3 CHRONIC KIDNEY DISEASE: Chronic | ICD-10-CM

## 2018-09-12 DIAGNOSIS — E11.8 CONTROLLED TYPE 2 DIABETES MELLITUS WITH COMPLICATION, WITHOUT LONG-TERM CURRENT USE OF INSULIN: Chronic | ICD-10-CM

## 2018-09-12 DIAGNOSIS — G89.29 CHRONIC BILATERAL LOW BACK PAIN WITH LEFT-SIDED SCIATICA: Primary | ICD-10-CM

## 2018-09-12 LAB — ALDOLASE SERPL-CCNC: 1.8 U/L

## 2018-09-12 PROCEDURE — 99999 PR PBB SHADOW E&M-EST. PATIENT-LVL V: CPT | Mod: PBBFAC,,, | Performed by: FAMILY MEDICINE

## 2018-09-12 PROCEDURE — 99214 OFFICE O/P EST MOD 30 MIN: CPT | Mod: S$PBB,,, | Performed by: FAMILY MEDICINE

## 2018-09-12 PROCEDURE — 3077F SYST BP >= 140 MM HG: CPT | Mod: CPTII,,, | Performed by: FAMILY MEDICINE

## 2018-09-12 PROCEDURE — 99215 OFFICE O/P EST HI 40 MIN: CPT | Mod: PBBFAC,PO,25 | Performed by: FAMILY MEDICINE

## 2018-09-12 PROCEDURE — 3080F DIAST BP >= 90 MM HG: CPT | Mod: CPTII,,, | Performed by: FAMILY MEDICINE

## 2018-09-12 PROCEDURE — 1101F PT FALLS ASSESS-DOCD LE1/YR: CPT | Mod: CPTII,,, | Performed by: FAMILY MEDICINE

## 2018-09-12 PROCEDURE — 3044F HG A1C LEVEL LT 7.0%: CPT | Mod: CPTII,,, | Performed by: FAMILY MEDICINE

## 2018-09-12 PROCEDURE — 72148 MRI LUMBAR SPINE W/O DYE: CPT | Mod: TC

## 2018-09-12 PROCEDURE — 82043 UR ALBUMIN QUANTITATIVE: CPT

## 2018-09-12 PROCEDURE — 72148 MRI LUMBAR SPINE W/O DYE: CPT | Mod: 26,,, | Performed by: RADIOLOGY

## 2018-09-12 RX ORDER — GABAPENTIN 400 MG/1
400 CAPSULE ORAL 2 TIMES DAILY
Qty: 180 CAPSULE | Refills: 1 | Status: SHIPPED | OUTPATIENT
Start: 2018-09-12 | End: 2019-04-16

## 2018-09-12 RX ORDER — HYDROCODONE BITARTRATE AND ACETAMINOPHEN 5; 325 MG/1; MG/1
1 TABLET ORAL EVERY 6 HOURS PRN
Qty: 90 TABLET | Refills: 0 | Status: ON HOLD | OUTPATIENT
Start: 2018-10-12 | End: 2018-10-08 | Stop reason: HOSPADM

## 2018-09-12 RX ORDER — HYDROCODONE BITARTRATE AND ACETAMINOPHEN 5; 325 MG/1; MG/1
1 TABLET ORAL EVERY 6 HOURS PRN
Qty: 90 TABLET | Refills: 0 | Status: SHIPPED | OUTPATIENT
Start: 2018-11-12 | End: 2018-12-12 | Stop reason: SDUPTHER

## 2018-09-12 RX ORDER — HYDROCODONE BITARTRATE AND ACETAMINOPHEN 5; 325 MG/1; MG/1
1 TABLET ORAL EVERY 6 HOURS PRN
Qty: 90 TABLET | Refills: 0 | Status: ON HOLD | OUTPATIENT
Start: 2018-09-12 | End: 2018-10-08 | Stop reason: HOSPADM

## 2018-09-12 RX ORDER — DIAZEPAM 5 MG/1
5 TABLET ORAL
Qty: 2 TABLET | Refills: 1 | Status: CANCELLED | OUTPATIENT
Start: 2018-09-12

## 2018-09-12 NOTE — PROGRESS NOTES
Chief Complaint   Patient presents with    Diabetes    Hypertension    Follow-up       HPI  Regino Lawrence is a 72 y.o. female with multiple medical diagnoses as listed in the medical history and problem list that presents for follow-up on diabetes and chronic pain. She has pain from her sarcoidosis along with pain in her low back and hip. Recently she has had trouble controlling this pain with tramadol. Her insurance did not pay for quantity increase so we changed to Norco. She reports improved pain control but has not been taking it more than at bedtime. She is pending an MRI for evaluation for lumbar surgery with Dr. Conner.    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, without long-term current use of insulin     Depression     Diabetes mellitus, type 2     Eye injury as a child     k-abrasion  od    Hyperlipidemia     Hypertension     Hypothyroidism     Immune deficiency disorder     Immune disorder     Myalgia and myositis 2012    Osteoporosis     Polyneuropathy     Renal manifestation of secondary diabetes mellitus     Sarcoidosis     Ulcer     no cancer    Urinary incontinence        PAST SURGICAL HISTORY:  Past Surgical History:   Procedure Laterality Date    CARPAL TUNNEL RELEASE      Rt wrist    CATARACT EXTRACTION W/  INTRAOCULAR LENS IMPLANT Right 2015    Dr. Azevedo    CATARACT EXTRACTION W/  INTRAOCULAR LENS IMPLANT Left 2015    Dr. Azevedo     SECTION      CHOLECYSTECTOMY      COLPOCLEISIS-- [place] N/A 2016    [facility]    CYSTOSCOPY N/A 2016    [facility]    INSERTION-INTRAOCULAR  LENS (IOL) Left 5/21/2015    [facility]    INSERTION-INTRAOCULAR LENS (IOL) Right 4/30/2015    [facility]    LAMINECTOMY-CERVICAL/FUSION-POSTERIOR C3-C7 N/A 11/16/2015    [facility] 2ND FLR    PHACOEMULSIFICATION-ASPIRATION-CATARACT Left 5/21/2015    [facility]    PHACOEMULSIFICATION-ASPIRATION-CATARACT Right 4/30/2015    [facility]    REPAIR-POSTERIOR N/A 11/28/2016    [facility] OR    SLING-TRANSOBTERATOR TAPE N/A 11/28/2016    [facility] OR    TUBAL LIGATION         SOCIAL HISTORY:  Social History     Socioeconomic History    Marital status:      Spouse name: Not on file    Number of children: Not on file    Years of education: Not on file    Highest education level: Not on file   Social Needs    Financial resource strain: Not on file    Food insecurity - worry: Not on file    Food insecurity - inability: Not on file    Transportation needs - medical: Not on file    Transportation needs - non-medical: Not on file   Occupational History    Not on file   Tobacco Use    Smoking status: Never Smoker    Smokeless tobacco: Never Used   Substance and Sexual Activity    Alcohol use: No    Drug use: No    Sexual activity: Yes     Partners: Male   Other Topics Concern    Not on file   Social History Narrative    Not on file       FAMILY HISTORY:  Family History   Problem Relation Age of Onset    Hypertension Mother     Cataracts Mother     No Known Problems Father     Hypertension Maternal Grandmother     Glaucoma Sister     Arthritis Sister     No Known Problems Brother     No Known Problems Maternal Aunt     No Known Problems Maternal Uncle     No Known Problems Paternal Aunt     No Known Problems Paternal Uncle     No  Known Problems Maternal Grandfather     No Known Problems Paternal Grandmother     No Known Problems Paternal Grandfather     Kidney failure Sister     Hepatitis Sister     Cancer Sister         bone cancer     Immunodeficiency Sister     Lupus Neg Hx     Rheum arthritis Neg Hx     Amblyopia Neg Hx     Blindness Neg Hx     Diabetes Neg Hx     Macular degeneration Neg Hx     Retinal detachment Neg Hx     Strabismus Neg Hx     Stroke Neg Hx     Thyroid disease Neg Hx     Endometrial cancer Neg Hx     Vaginal cancer Neg Hx     Cervical cancer Neg Hx        ALLERGIES AND MEDICATIONS: updated and reviewed.  Review of patient's allergies indicates:   Allergen Reactions    Azathioprine Shortness Of Breath and Other (See Comments)     Fatigue     Current Outpatient Medications   Medication Sig Dispense Refill    ACCU-CHEK FASTCLIX Kit USE AS DIRECTED. 1 each 0    ALCOHOL ANTISEPTIC PADS (ALCOHOL PREP PADS TOP)       amLODIPine (NORVASC) 10 MG tablet Take 0.5 tablets (5 mg total) by mouth once daily. 90 tablet 1    blood sugar diagnostic Strp 1 each by Misc.(Non-Drug; Combo Route) route once daily. 100 strip 11    blood-glucose meter kit Use as instructed 1 each 0    calcium carbonate (OS-MALCOLM) 600 mg calcium (1,500 mg) Tab Take 1 tablet by mouth 2 (two) times daily.       carvedilol (COREG) 12.5 MG tablet Take 1 tablet (12.5 mg total) by mouth 2 (two) times daily. 180 tablet 1    cloNIDine (CATAPRES) 0.3 MG tablet Take 1 tablet (0.3 mg total) by mouth 3 (three) times daily. 270 tablet 1    cyclobenzaprine (FLEXERIL) 10 MG tablet Take 1 tablet (10 mg total) by mouth 3 (three) times daily as needed for Muscle spasms. 270 tablet 0    epoetin german (PROCRIT INJ) Inject 1,000 Units as directed.      fenofibrate 160 MG Tab Take 1 tablet (160 mg total) by mouth once daily. 90 tablet 1    fexofenadine (ALLEGRA ALLERGY) 180 MG tablet Take 180 mg by mouth daily as needed.       folic acid (FOLVITE) 1 MG  tablet Take 1 tablet (1 mg total) by mouth once daily. 90 tablet 0    gabapentin (NEURONTIN) 400 MG capsule Take 1 capsule (400 mg total) by mouth 2 (two) times daily. 180 capsule 1    [START ON 11/12/2018] HYDROcodone-acetaminophen (NORCO) 5-325 mg per tablet Take 1 tablet by mouth every 6 (six) hours as needed. 90 tablet 0    [START ON 10/12/2018] HYDROcodone-acetaminophen (NORCO) 5-325 mg per tablet Take 1 tablet by mouth every 6 (six) hours as needed. 90 tablet 0    HYDROcodone-acetaminophen (NORCO) 5-325 mg per tablet Take 1 tablet by mouth every 6 (six) hours as needed. 90 tablet 0    lancing device (ACCU-CHEK SOFTCLIX LANCET DEV) Misc Accu-chek FastClix kit 1 each 0    levothyroxine (SYNTHROID) 50 MCG tablet TAKE 1 TABLET (50 MCG TOTAL) BY MOUTH BEFORE BREAKFAST. 90 tablet 1    LUBRICANT EYE DROPS, GLYC-PG, 1-0.3 % Drop       metFORMIN (GLUCOPHAGE) 1000 MG tablet Take 1 tablet (1,000 mg total) by mouth 2 (two) times daily with meals. 180 tablet 1    methotrexate 2.5 MG Tab Take 4 tablets (10 mg total) by mouth every 7 days. 48 tablet 0    ondansetron (ZOFRAN) 8 MG tablet Take 1 tablet (8 mg total) by mouth every 8 (eight) hours as needed for Nausea. 25 tablet 1    potassium chloride SA (K-DUR,KLOR-CON) 20 MEQ tablet Take 1 tablet (20 mEq total) by mouth 2 (two) times daily. 180 tablet 1    predniSONE (DELTASONE) 10 MG tablet Take 1 tablet (10 mg total) by mouth once daily. 90 tablet 1     No current facility-administered medications for this visit.        ROS  Review of Systems   Constitutional: Negative for chills, diaphoresis, fatigue, fever and unexpected weight change.   HENT: Negative for rhinorrhea, sinus pressure, sore throat and tinnitus.    Eyes: Negative for photophobia and visual disturbance.   Respiratory: Negative for cough, shortness of breath and wheezing.    Cardiovascular: Negative for chest pain and palpitations.   Gastrointestinal: Negative for abdominal pain, blood in stool,  constipation, diarrhea, nausea and vomiting.   Genitourinary: Negative for dysuria, flank pain, frequency and vaginal discharge.   Musculoskeletal: Positive for arthralgias and back pain. Negative for joint swelling.   Skin: Negative for rash.   Neurological: Negative for speech difficulty, weakness, light-headedness and headaches.   Psychiatric/Behavioral: Negative for behavioral problems and dysphoric mood.       Physical Exam  Vitals:    09/12/18 1131 09/12/18 1138   BP: (!) 162/98 (!) 160/98   Pulse: 105    Resp: 16    Temp: 98.6 °F (37 °C)    SpO2: 98%    Weight: 56.7 kg (125 lb)    Height: 5' (1.524 m)     Body mass index is 24.41 kg/m².  Weight: 56.7 kg (125 lb)   Height: 5' (152.4 cm)     Physical Exam   Constitutional: She is oriented to person, place, and time. She appears well-developed and well-nourished.   HENT:   Head: Normocephalic and atraumatic.   Eyes: EOM are normal.   Musculoskeletal:   Swelling along the MTP joints of both hands   Neurological: She is alert and oriented to person, place, and time.   Skin: Skin is warm and dry. No rash noted. No erythema.   Psychiatric: She has a normal mood and affect. Her behavior is normal.   Nursing note and vitals reviewed.      Health Maintenance       Date Due Completion Date    Lipid Panel 05/08/2018 5/8/2017    Urine Microalbumin 05/18/2018 5/18/2017    Influenza Vaccine 08/01/2018 9/27/2017 (Done)    Override on 9/27/2017: Done    Hemoglobin A1c 12/12/2018 6/12/2018    Foot Exam 06/19/2019 6/19/2018 (Done)    Override on 6/19/2018: Done    Override on 5/12/2017: Done    Mammogram 06/26/2019 6/26/2018    Eye Exam 08/06/2019 8/6/2018    DEXA SCAN 05/05/2020 5/5/2017    Colonoscopy 02/09/2025 2/9/2015 (Done)    Override on 2/9/2015: Done    Override on 2/18/2005: Done (reportedly normal)    TETANUS VACCINE 05/16/2026 5/16/2016            ASSESSMENT     1. Chronic bilateral low back pain with left-sided sciatica    2. Polyneuropathy    3. Degenerative disc  disease, lumbar    4. Diabetes mellitus with stage 3 chronic kidney disease    5. Controlled type 2 diabetes mellitus with complication, without long-term current use of insulin        PLAN:     Problem List Items Addressed This Visit        Neuro    Polyneuropathy  -continue current medication    Relevant Medications    gabapentin (NEURONTIN) 400 MG capsule    Degenerative disc disease, lumbar  -continue current medication, she needs to take her medication when she has pain, discussed appropriate use of the medication for pain control    Relevant Medications    HYDROcodone-acetaminophen (NORCO) 5-325 mg per tablet (Start on 11/12/2018)    HYDROcodone-acetaminophen (NORCO) 5-325 mg per tablet (Start on 10/12/2018)    HYDROcodone-acetaminophen (NORCO) 5-325 mg per tablet       Endocrine    Diabetes mellitus type 2, controlled (Chronic)  -continue current medication  Lab Results   Component Value Date    HGBA1C 6.0 (H) 01/22/2018         Diabetes mellitus with stage 3 chronic kidney disease (Chronic)  -check microalbumin       Orthopedic    Chronic bilateral low back pain with left-sided sciatica - Primary  -she is following up for neurosurgery with Dr. Conner, s/p PT    Relevant Orders                Micaela Mendoza MD  09/12/2018 11:55 AM        Follow-up in about 3 months (around 12/12/2018).

## 2018-09-13 LAB
ALBUMIN/CREAT UR: 41.2 UG/MG
CREAT UR-MCNC: 17 MG/DL
MICROALBUMIN UR DL<=1MG/L-MCNC: 7 UG/ML

## 2018-09-13 RX ORDER — DIAZEPAM 5 MG/1
5 TABLET ORAL
Qty: 2 TABLET | Refills: 0 | Status: SHIPPED | OUTPATIENT
Start: 2018-09-13 | End: 2018-12-04

## 2018-09-14 RX ORDER — BLOOD SUGAR DIAGNOSTIC
STRIP MISCELLANEOUS
Qty: 100 STRIP | Refills: 11 | Status: SHIPPED | OUTPATIENT
Start: 2018-09-14 | End: 2019-11-04 | Stop reason: SDUPTHER

## 2018-09-21 ENCOUNTER — INFUSION (OUTPATIENT)
Dept: INFUSION THERAPY | Facility: HOSPITAL | Age: 73
End: 2018-09-21
Attending: INTERNAL MEDICINE
Payer: MEDICARE

## 2018-09-21 VITALS
DIASTOLIC BLOOD PRESSURE: 69 MMHG | RESPIRATION RATE: 16 BRPM | WEIGHT: 125 LBS | HEART RATE: 78 BPM | OXYGEN SATURATION: 98 % | BODY MASS INDEX: 24.41 KG/M2 | TEMPERATURE: 97 F | SYSTOLIC BLOOD PRESSURE: 145 MMHG

## 2018-09-21 DIAGNOSIS — N18.9 ANEMIA IN CHRONIC KIDNEY DISEASE, UNSPECIFIED CKD STAGE: Primary | ICD-10-CM

## 2018-09-21 DIAGNOSIS — D63.1 ANEMIA IN CHRONIC KIDNEY DISEASE, UNSPECIFIED CKD STAGE: Primary | ICD-10-CM

## 2018-09-21 DIAGNOSIS — Z53.1 REFUSAL OF BLOOD TRANSFUSIONS AS PATIENT IS JEHOVAH'S WITNESS: ICD-10-CM

## 2018-09-21 PROCEDURE — 63600175 PHARM REV CODE 636 W HCPCS: Mod: JG,EC | Performed by: INTERNAL MEDICINE

## 2018-09-21 PROCEDURE — 96372 THER/PROPH/DIAG INJ SC/IM: CPT

## 2018-09-21 RX ADMIN — ERYTHROPOIETIN 20000 UNITS: 20000 INJECTION, SOLUTION INTRAVENOUS; SUBCUTANEOUS at 11:09

## 2018-09-24 DIAGNOSIS — I10 ESSENTIAL HYPERTENSION: ICD-10-CM

## 2018-09-25 RX ORDER — AMLODIPINE BESYLATE 10 MG/1
TABLET ORAL
Qty: 90 TABLET | Refills: 0 | Status: SHIPPED | OUTPATIENT
Start: 2018-09-25 | End: 2019-01-22 | Stop reason: SDUPTHER

## 2018-09-26 ENCOUNTER — OFFICE VISIT (OUTPATIENT)
Dept: NEUROSURGERY | Facility: CLINIC | Age: 73
End: 2018-09-26
Payer: MEDICARE

## 2018-09-26 ENCOUNTER — CLINICAL SUPPORT (OUTPATIENT)
Dept: FAMILY MEDICINE | Facility: CLINIC | Age: 73
End: 2018-09-26
Payer: MEDICARE

## 2018-09-26 VITALS
SYSTOLIC BLOOD PRESSURE: 188 MMHG | WEIGHT: 126.13 LBS | TEMPERATURE: 98 F | BODY MASS INDEX: 24.63 KG/M2 | DIASTOLIC BLOOD PRESSURE: 88 MMHG | HEART RATE: 94 BPM

## 2018-09-26 VITALS — DIASTOLIC BLOOD PRESSURE: 66 MMHG | SYSTOLIC BLOOD PRESSURE: 128 MMHG | OXYGEN SATURATION: 98 % | HEART RATE: 84 BPM

## 2018-09-26 DIAGNOSIS — R26.89 ANTALGIC GAIT: ICD-10-CM

## 2018-09-26 DIAGNOSIS — M51.36 DEGENERATIVE DISC DISEASE, LUMBAR: Primary | ICD-10-CM

## 2018-09-26 DIAGNOSIS — I10 ESSENTIAL HYPERTENSION: Primary | ICD-10-CM

## 2018-09-26 DIAGNOSIS — M54.50 ACUTE LEFT-SIDED LOW BACK PAIN WITHOUT SCIATICA: ICD-10-CM

## 2018-09-26 DIAGNOSIS — M51.16 LUMBAR DISC HERNIATION WITH RADICULOPATHY: ICD-10-CM

## 2018-09-26 PROCEDURE — 99213 OFFICE O/P EST LOW 20 MIN: CPT | Mod: S$PBB,,, | Performed by: PHYSICIAN ASSISTANT

## 2018-09-26 PROCEDURE — 99999 PR PBB SHADOW E&M-EST. PATIENT-LVL IV: CPT | Mod: PBBFAC,,, | Performed by: PHYSICIAN ASSISTANT

## 2018-09-26 PROCEDURE — 99212 OFFICE O/P EST SF 10 MIN: CPT | Mod: PBBFAC,PO

## 2018-09-26 PROCEDURE — 3074F SYST BP LT 130 MM HG: CPT | Mod: CPTII,,, | Performed by: PHYSICIAN ASSISTANT

## 2018-09-26 PROCEDURE — 99214 OFFICE O/P EST MOD 30 MIN: CPT | Mod: PBBFAC,27 | Performed by: PHYSICIAN ASSISTANT

## 2018-09-26 PROCEDURE — 3079F DIAST BP 80-89 MM HG: CPT | Mod: CPTII,,, | Performed by: PHYSICIAN ASSISTANT

## 2018-09-26 PROCEDURE — 1101F PT FALLS ASSESS-DOCD LE1/YR: CPT | Mod: CPTII,,, | Performed by: PHYSICIAN ASSISTANT

## 2018-09-26 PROCEDURE — 99999 PR PBB SHADOW E&M-EST. PATIENT-LVL II: CPT | Mod: PBBFAC,,,

## 2018-09-26 PROCEDURE — 99499 UNLISTED E&M SERVICE: CPT | Mod: S$PBB,,, | Performed by: FAMILY MEDICINE

## 2018-09-26 NOTE — PROGRESS NOTES
[facility] - Neurosurgery 7th Fl  Neurosurgery  Established Patient    Patient Name: Regino Lawrence  Primary Care Provider: Micaela Mendoza MD      Subjective:     Chief Complaint/Reason for Admission: Low back and leg pain    History of Present Illness:   Ms. Lawrence is a 72 year old female with a history of cervical spinal stenosis s/p posterior C3-C7 laminectomy and fusion on 11/16/15 by Dr. Conner. She was last seen in clinic by Dr. Conner on 7/20/18. At that time, she had complaints of left sided back, hip, and leg pain that was present with standing and resolved with sitting. She was referred to PT and presents to clinic today to discuss pain after therapy. Patient states that she completed her course of PT but has not experienced any improvement in her pain. She also continues to take her PO pain medication without much relief. She reports severe pain that is limiting her ability to perform ADL's. She has been to the ED twice and fallen 3 times since her last clinic appt. She reports constant, sharp, left sided low back pain. This pain, along with numbness and tingling, radiate along the lateral aspect of her LLE, terminating in the foot. Denies any right sided symptoms. Her pain increases with standing and walking. Her left leg often feels as if it going to give. She has started using a rollator to ambulate due to her fear of falling. Back pain improves with rest and sitting. Her leg pain completely resolves with sitting but the numbness and tingling remain. Denies any bladder or bowel incontinence. Denies any focal LE weakness. Patient is interested in surgical intervention.         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, without long-term current use of insulin     Depression     Diabetes mellitus, type 2     Eye injury as a child     k-abrasion  od    Hyperlipidemia     Hypertension     Hypothyroidism     Immune deficiency disorder     Immune disorder     Myalgia and myositis 2012    Osteoporosis     Polyneuropathy     Renal manifestation of secondary diabetes mellitus     Sarcoidosis     Ulcer     no cancer    Urinary incontinence      Past Surgical History:   Procedure Laterality Date    CARPAL TUNNEL RELEASE      Rt wrist    CATARACT EXTRACTION W/  INTRAOCULAR LENS IMPLANT Right 2015    Dr. Azevedo    CATARACT EXTRACTION W/  INTRAOCULAR LENS IMPLANT Left 2015    Dr. Azevedo     SECTION      CHOLECYSTECTOMY      COLPOCLEISIS-- [place] N/A 2016    [facility]    CYSTOSCOPY N/A 2016    [facility] by [facility] at [facility] OR    INSERTION-INTRAOCULAR LENS (IOL) Left 2015    [facility] at [facility] OR    INSERTION-INTRAOCULAR LENS (IOL) Right 2015    [facility] at [facility] OR    LAMINECTOMY-CERVICAL/FUSION-POSTERIOR C3-C7 N/A 2015    [facility]formed [facility] at [facility] OR 2ND FLR    PHACOEMULSIFICATION-ASPIRATION-CATARACT Left 2015    [facility] at [facility] OR    PHACOEMULSIFICATION-ASPIRATION-CATARACT Right 2015    [facility] at [facility] OR    REPAIR-POSTERIOR N/A 2016    [facility]formed by [facility] at [facility] OR    SLING-TRANSOBTERATOR TAPE N/A 2016    [facility] by [facility] at [facility] OR    TUBAL LIGATION       Family History     Problem Relation (Age of Onset)    Arthritis Sister    Cancer Sister    Cataracts Mother     Glaucoma Sister    Hepatitis Sister    Hypertension Mother, Maternal Grandmother    Immunodeficiency Sister    Kidney failure Sister    No Known Problems Father, Brother, Maternal Aunt, Maternal Uncle, Paternal Aunt, Paternal Uncle, Maternal Grandfather, Paternal Grandmother, Paternal Grandfather        Tobacco Use    Smoking status: Never Smoker    Smokeless tobacco: Never Used   Substance and Sexual Activity    Alcohol use: No    Drug use: No    Sexual activity: Yes     Partners: Male     Review of Systems  Objective:     Vitals:    09/26/18 1353   BP: (!) 188/88   Pulse: 94   Temp: 97.9 °F (36.6 °C)          Neurosurgery Physical Exam  General: well developed, well nourished, no distress.   Head: normocephalic, atraumatic  Neurologic: Alert and oriented. Thought content appropriate.  GCS: Motor: 6/Verbal: 5/Eyes: 4 GCS Total: 15  Mental Status: Awake, Alert, Oriented x 4  Language: No aphasia  Speech: No dysarthria  Cranial nerves: face symmetric, tongue midline, CN II-XII grossly intact.   Eyes: pupils equal, round, reactive to light with accomodation, EOMI.   Pulmonary: normal respirations, no signs of respiratory distress  Abdomen: soft, non-distended, not tender to palpation  Sensory: intact to light touch throughout  Motor Strength:Moves all extremities spontaneously with good tone.  Full strength upper and lower extremities. No abnormal movements seen.   Silverman: absent  Clonus: absent  Skin: Skin is warm, dry and intact.  Straight leg raise: negative  Gait: slow, antalgic    Lumbar ROM: decreased 2/2 pain   SI Joint tenderness: Negative     Tenderness with external/internal hip rotation: Negative.  Mild TTP of lumbar spine, midline, around L4-S1         Significant Diagnostics:  MRI lumbar spine 9/12/18:  I independently reviewed the imaging.     Moderate size left foraminal disc extrusion at L5-S1, likely impinging upon the exiting left L5 nerve root.    Additional lumbar spondylosis, resulting in  mild neural foraminal narrowing at L1-2, L2-3, and L4-5, as above.  No spinal canal stenosis.      Assessment/Plan:     Assessment:  Ms. Lawrence is a 72 year old female with a history of cervical spinal stenosis s/p posterior C3-C7 laminectomy and fusion on 11/16/15 by Dr. Conner, with left sided back, hip, and leg pain that is present with standing and resolves with sitting. MRI shows an L5/S1 HNP that abuts the left L5 nerve root.     Plan:  -Patient neurologically stable on exam  -No improvement in pain with PT or PO medications. Referral to [facility] for left L5/S1 injection. If no improvement with the injection, will need to refer patient to Dr. Conner to discuss possible surgical intervention.   -Continue management of PO medications per current pain management/heme onc physician  -RTC in 4 weeks to discuss pain after injection  -Encouraged patient to call the clinic with any questions, concerns, or exam changes prior to follow up appt.       BENITA Hi  Neurosurgery  [facility] - Neurosurgery [facility]

## 2018-09-26 NOTE — PROGRESS NOTES
Regino Lawrence 72 y.o. female is here today for Blood Pressure check.   History of HTN yes.    Review of patient's allergies indicates:   Allergen Reactions    Azathioprine Shortness Of Breath and Other (See Comments)     Fatigue     Creatinine   Date Value Ref Range Status   09/10/2018 1.1 0.5 - 1.4 mg/dL Final     Sodium   Date Value Ref Range Status   09/10/2018 139 136 - 145 mmol/L Final     Potassium   Date Value Ref Range Status   09/10/2018 3.5 3.5 - 5.1 mmol/L Final   ]  Patient verifies taking blood pressure medications on a regular basis at the same time of the day.     Current Outpatient Medications:     ACCU-CHEK FASTCLIX Kit, USE AS DIRECTED., Disp: 1 each, Rfl: 0    ACCU-CHEK SMARTVIEW TEST STRIP Strp, TEST  ONCE  A  DAY, Disp: 100 strip, Rfl: 11    ALCOHOL ANTISEPTIC PADS (ALCOHOL PREP PADS TOP), , Disp: , Rfl:     amLODIPine (NORVASC) 10 MG tablet, TAKE ONE TABLET BY MOUTH ONCE DAILY, Disp: 90 tablet, Rfl: 0    blood-glucose meter kit, Use as instructed, Disp: 1 each, Rfl: 0    calcium carbonate (OS-MALCOLM) 600 mg calcium (1,500 mg) Tab, Take 1 tablet by mouth 2 (two) times daily. , Disp: , Rfl:     carvedilol (COREG) 12.5 MG tablet, Take 1 tablet (12.5 mg total) by mouth 2 (two) times daily., Disp: 180 tablet, Rfl: 1    cloNIDine (CATAPRES) 0.3 MG tablet, Take 1 tablet (0.3 mg total) by mouth 3 (three) times daily., Disp: 270 tablet, Rfl: 1    cyclobenzaprine (FLEXERIL) 10 MG tablet, Take 1 tablet (10 mg total) by mouth 3 (three) times daily as needed for Muscle spasms., Disp: 270 tablet, Rfl: 0    diazePAM (VALIUM) 5 MG tablet, Take 1 tablet (5 mg total) by mouth as needed for Anxiety. Take 1 tablet (5 mg total) by mouth 30 mins before MRI, take second tablet, if needed, just before MRI, Disp: 2 tablet, Rfl: 0    epoetin german (PROCRIT INJ), Inject 1,000 Units as directed., Disp: , Rfl:     fenofibrate 160 MG Tab, Take 1 tablet (160 mg total) by mouth once daily., Disp: 90 tablet, Rfl:  1    fexofenadine (ALLEGRA ALLERGY) 180 MG tablet, Take 180 mg by mouth daily as needed. , Disp: , Rfl:     folic acid (FOLVITE) 1 MG tablet, Take 1 tablet (1 mg total) by mouth once daily., Disp: 90 tablet, Rfl: 0    gabapentin (NEURONTIN) 400 MG capsule, Take 1 capsule (400 mg total) by mouth 2 (two) times daily., Disp: 180 capsule, Rfl: 1    [START ON 11/12/2018] HYDROcodone-acetaminophen (NORCO) 5-325 mg per tablet, Take 1 tablet by mouth every 6 (six) hours as needed., Disp: 90 tablet, Rfl: 0    [START ON 10/12/2018] HYDROcodone-acetaminophen (NORCO) 5-325 mg per tablet, Take 1 tablet by mouth every 6 (six) hours as needed., Disp: 90 tablet, Rfl: 0    HYDROcodone-acetaminophen (NORCO) 5-325 mg per tablet, Take 1 tablet by mouth every 6 (six) hours as needed., Disp: 90 tablet, Rfl: 0    lancing device (ACCU-CHEK SOFTCLIX LANCET DEV) Misc, Accu-chek FastClix kit, Disp: 1 each, Rfl: 0    levothyroxine (SYNTHROID) 50 MCG tablet, TAKE 1 TABLET (50 MCG TOTAL) BY MOUTH BEFORE BREAKFAST., Disp: 90 tablet, Rfl: 1    LUBRICANT EYE DROPS, GLYC-PG, 1-0.3 % Drop, , Disp: , Rfl:     metFORMIN (GLUCOPHAGE) 1000 MG tablet, Take 1 tablet (1,000 mg total) by mouth 2 (two) times daily with meals., Disp: 180 tablet, Rfl: 1    methotrexate 2.5 MG Tab, Take 4 tablets (10 mg total) by mouth every 7 days., Disp: 48 tablet, Rfl: 0    ondansetron (ZOFRAN) 8 MG tablet, Take 1 tablet (8 mg total) by mouth every 8 (eight) hours as needed for Nausea., Disp: 25 tablet, Rfl: 1    potassium chloride SA (K-DUR,KLOR-CON) 20 MEQ tablet, Take 1 tablet (20 mEq total) by mouth 2 (two) times daily., Disp: 180 tablet, Rfl: 1    predniSONE (DELTASONE) 10 MG tablet, Take 1 tablet (10 mg total) by mouth once daily., Disp: 90 tablet, Rfl: 1  Does patient have record of home blood pressure readings no..   Last dose of blood pressure medication was taken at 7:30 this morning.  Patient is asymptomatic.   Complains of none.    Vitals:     09/26/18 1035   BP: 128/66   BP Location: Right arm   Patient Position: Sitting   BP Method: Medium (Manual)   Pulse: 84   SpO2: 98%         Dr. Mendoza informed of nurse visit.

## 2018-09-27 DIAGNOSIS — M51.36 DDD (DEGENERATIVE DISC DISEASE), LUMBAR: Primary | ICD-10-CM

## 2018-09-27 DIAGNOSIS — M51.16 LUMBAR DISC HERNIATION WITH RADICULOPATHY: ICD-10-CM

## 2018-09-28 ENCOUNTER — TELEPHONE (OUTPATIENT)
Dept: HEMATOLOGY/ONCOLOGY | Facility: CLINIC | Age: 73
End: 2018-09-28

## 2018-10-01 ENCOUNTER — OFFICE VISIT (OUTPATIENT)
Dept: HEMATOLOGY/ONCOLOGY | Facility: CLINIC | Age: 73
End: 2018-10-01
Payer: MEDICARE

## 2018-10-01 VITALS
HEIGHT: 60 IN | HEART RATE: 82 BPM | BODY MASS INDEX: 24.54 KG/M2 | WEIGHT: 125 LBS | DIASTOLIC BLOOD PRESSURE: 80 MMHG | TEMPERATURE: 98 F | SYSTOLIC BLOOD PRESSURE: 151 MMHG

## 2018-10-01 DIAGNOSIS — N18.9 ANEMIA IN CHRONIC KIDNEY DISEASE, UNSPECIFIED CKD STAGE: Primary | ICD-10-CM

## 2018-10-01 DIAGNOSIS — D63.1 ANEMIA IN CHRONIC KIDNEY DISEASE, UNSPECIFIED CKD STAGE: Primary | ICD-10-CM

## 2018-10-01 DIAGNOSIS — N18.9 CHRONIC KIDNEY DISEASE, UNSPECIFIED CKD STAGE: ICD-10-CM

## 2018-10-01 PROCEDURE — 99213 OFFICE O/P EST LOW 20 MIN: CPT | Mod: S$PBB,,, | Performed by: INTERNAL MEDICINE

## 2018-10-01 PROCEDURE — 3077F SYST BP >= 140 MM HG: CPT | Mod: CPTII,,, | Performed by: INTERNAL MEDICINE

## 2018-10-01 PROCEDURE — 1101F PT FALLS ASSESS-DOCD LE1/YR: CPT | Mod: CPTII,,, | Performed by: INTERNAL MEDICINE

## 2018-10-01 PROCEDURE — 99999 PR PBB SHADOW E&M-EST. PATIENT-LVL IV: CPT | Mod: PBBFAC,,, | Performed by: INTERNAL MEDICINE

## 2018-10-01 PROCEDURE — 99214 OFFICE O/P EST MOD 30 MIN: CPT | Mod: PBBFAC,PO | Performed by: INTERNAL MEDICINE

## 2018-10-01 PROCEDURE — 3079F DIAST BP 80-89 MM HG: CPT | Mod: CPTII,,, | Performed by: INTERNAL MEDICINE

## 2018-10-01 NOTE — PROGRESS NOTES
Subjective:       Patient ID: Regino Lawrence is a 72 y.o. female.    Chief Complaint: No chief complaint on file.  Diagnosis: Anemia in CKD  Patient is a Z[employer]ast[employer]ism  .  HPI    The patient is seen today for chronic anemia in CKD. The patient reports that she has been diagnosed with JOLLY in the past.  She has been on oral iron supplementation therapy, but could not tolerate or did not respond, she is uncertain.  She is followed by GI and has undergone a colonoscopy earlier this year and was diagnosed with hemorrhoids for which she underwent banding procedure.  No melena, hematochezia,change in bowel habits.  She has also been diagnosed with B12 deficiency in the past, but reports she did not respond to B12 injections. No history of blood transfusions.  She is a Zoroastrianism.  She reports that she remembers getting injections in the 1970s when her blood count was low.        She is followed by Rheumatology for history of sarcoidosis with associated myopathy and arthropathy.   .She has been treated in the  past with methotrexate and Plaquenil, both of which were ineffective  Cellcept and imuran caused some unknown side effect.   Colchicine  held due to low WBC   She continues on chronic steroid therapy, Prednisone 10mg qd    Today, she continues with Chronic diffuse arthralgias  She ambulates with cane  Mild  Chronic fatigue-stable  No SOB/CP   She continues to undergo Procrit therapy q 2wks  She undergoes intermittent IV iron therapy     CBC  reveals wbc 3650/mm3  Hb 10.2 g/dl Hct 31,5  % Plt ct 279k    Patient was in the ED 07/27/2018 and was seen on 7/17  at [facility] for back issues  She is followed by Neurosurgery for cervical spinal stenosis s/p posterior C3-C7 laminectomy and fusion          PAST MEDICAL HISTORY:  Acid reflux, alopecia, anemia, anxiety disorder, chronic  kidney disease, depression, diabetes mellitus type 2, hyperlipidemia,  hypertension, hypothyroidism, osteoporosis, sarcoidosis.    PAST  "SURGICAL HISTORY:  Cholecystectomy, , tubal ligation, carpal tunnel release, cataracts.    FAMILY HISTORY: Unremarkable for cancer. Significant for HTN.       Review of Systems   Constitutional: Positive for fatigue. Negative for activity change and appetite change.   HENT: Negative for hearing loss and nosebleeds.    Eyes: Negative for visual disturbance.   Respiratory: Negative for cough and shortness of breath.    Cardiovascular: Negative for chest pain and leg swelling.   Gastrointestinal: Negative for abdominal pain, constipation, diarrhea and nausea.   Genitourinary: Negative for flank pain and urgency.   Musculoskeletal: Positive for arthralgias, back pain, gait problem and joint swelling.   Skin: Negative for rash.        No petechiae, ecchymoses   Neurological: Negative for light-headedness and headaches.   Hematological: Negative for adenopathy. Does not bruise/bleed easily.       Objective:       Vitals:    10/01/18 1101   BP: (!) 151/80   BP Location: Right arm   Patient Position: Sitting   BP Method: Medium (Automatic)   Pulse: 82   Temp: 97.9 °F (36.6 °C)   TempSrc: Oral   Weight: 56.7 kg (125 lb 0 oz)   Height: 4' 11.5" (1.511 m)       Physical Exam   Constitutional: She is oriented to person, place, and time. She appears well-developed and well-nourished.   HENT:   Head: Normocephalic.   Mouth/Throat: Oropharynx is clear and moist. No oropharyngeal exudate.   Eyes: Conjunctivae and lids are normal. Pupils are equal, round, and reactive to light. No scleral icterus.   Neck: Normal range of motion. Neck supple. No thyromegaly present.   Cardiovascular: Normal rate, regular rhythm and normal heart sounds.   No murmur heard.  Pulmonary/Chest: Breath sounds normal. She has no wheezes. She has no rales.   Abdominal: Soft. Bowel sounds are normal. She exhibits no distension and no mass. There is no hepatosplenomegaly. There is no tenderness. There is no rebound and no guarding.   Musculoskeletal: " Normal range of motion. She exhibits no edema or tenderness.   Lymphadenopathy:     She has no cervical adenopathy.     She has no axillary adenopathy.        Right: No supraclavicular adenopathy present.        Left: No supraclavicular adenopathy present.   Neurological: She is alert and oriented to person, place, and time. No cranial nerve deficit. Coordination normal.   Skin: Skin is warm and dry. No ecchymosis, no petechiae and no rash noted. No erythema.   Psychiatric: She has a normal mood and affect.             Lab Results   Component Value Date    WBC 3.65 (L) 10/05/2018    HGB 10.2 (L) 10/05/2018    HCT 31.5 (L) 10/05/2018     (H) 10/05/2018     10/05/2018     Lab Results   Component Value Date    IRON 135 09/26/2018    TIBC 392 09/26/2018    FERRITIN 65 09/26/2018     SPEP-nl          CT renal 3/6/2017   IMPRESSION:  1.  No renal, ureteral or bladder calculi.  No hydronephrosis or ureterectasis.  2.  Poorly distended bladder.  Mild bladder wall prominence.  Mild cystitis cannot be   excluded.  3.  Moderate constipation.  Normal appendix      Lab Results   Component Value Date    IRON 135 09/26/2018    TIBC 392 09/26/2018    FERRITIN 65 09/26/2018       Assessment:       1. Anemia in chronic kidney disease, unspecified CKD stage    2. Chronic kidney disease, unspecified CKD stage        Plan:   1-2  Pt clinically stable  Pt is a [employer] and declines/not interested in blood and blood products due to Spiritism beliefs  Hb 10.2  g/dl -stable  Ferritin 65  Cont procrit to 20,000u q 2wk    CBC q2wks STANDING    Follow-up with PCP for med mgmt    F/u 2 mos with Fe studies        CC: Micaela Mendoza M.D.

## 2018-10-02 DIAGNOSIS — D86.9 SARCOIDOSIS: Chronic | ICD-10-CM

## 2018-10-02 DIAGNOSIS — G72.9 MYOPATHY: ICD-10-CM

## 2018-10-02 RX ORDER — FOLIC ACID 1 MG/1
1 TABLET ORAL DAILY
Qty: 90 TABLET | Refills: 0 | Status: SHIPPED | OUTPATIENT
Start: 2018-10-02 | End: 2019-01-22 | Stop reason: SDUPTHER

## 2018-10-03 DIAGNOSIS — D86.9 SARCOIDOSIS: Chronic | ICD-10-CM

## 2018-10-03 DIAGNOSIS — G72.9 MYOPATHY: ICD-10-CM

## 2018-10-03 RX ORDER — FOLIC ACID 1 MG/1
TABLET ORAL
Qty: 90 TABLET | Refills: 0 | OUTPATIENT
Start: 2018-10-03

## 2018-10-05 ENCOUNTER — LAB VISIT (OUTPATIENT)
Dept: LAB | Facility: HOSPITAL | Age: 73
End: 2018-10-05
Attending: INTERNAL MEDICINE
Payer: MEDICARE

## 2018-10-05 ENCOUNTER — INFUSION (OUTPATIENT)
Dept: INFUSION THERAPY | Facility: HOSPITAL | Age: 73
End: 2018-10-05
Attending: INTERNAL MEDICINE
Payer: MEDICARE

## 2018-10-05 DIAGNOSIS — Z53.1 REFUSAL OF BLOOD TRANSFUSIONS AS PATIENT IS JEHOVAH'S WITNESS: ICD-10-CM

## 2018-10-05 DIAGNOSIS — D63.1 ANEMIA IN CHRONIC KIDNEY DISEASE, UNSPECIFIED CKD STAGE: ICD-10-CM

## 2018-10-05 DIAGNOSIS — N18.9 ANEMIA IN CHRONIC KIDNEY DISEASE, UNSPECIFIED CKD STAGE: Primary | ICD-10-CM

## 2018-10-05 DIAGNOSIS — D63.1 ANEMIA IN CHRONIC KIDNEY DISEASE, UNSPECIFIED CKD STAGE: Primary | ICD-10-CM

## 2018-10-05 DIAGNOSIS — N18.9 ANEMIA IN CHRONIC KIDNEY DISEASE, UNSPECIFIED CKD STAGE: ICD-10-CM

## 2018-10-05 LAB
BASOPHILS # BLD AUTO: 0.01 K/UL
BASOPHILS NFR BLD: 0.3 %
DIFFERENTIAL METHOD: ABNORMAL
EOSINOPHIL # BLD AUTO: 0.1 K/UL
EOSINOPHIL NFR BLD: 2.5 %
ERYTHROCYTE [DISTWIDTH] IN BLOOD BY AUTOMATED COUNT: 14 %
HCT VFR BLD AUTO: 31.5 %
HGB BLD-MCNC: 10.2 G/DL
LYMPHOCYTES # BLD AUTO: 1.1 K/UL
LYMPHOCYTES NFR BLD: 31 %
MCH RBC QN AUTO: 32.5 PG
MCHC RBC AUTO-ENTMCNC: 32.4 G/DL
MCV RBC AUTO: 100 FL
MONOCYTES # BLD AUTO: 0.4 K/UL
MONOCYTES NFR BLD: 9.6 %
NEUTROPHILS # BLD AUTO: 2.1 K/UL
NEUTROPHILS NFR BLD: 56.6 %
PLATELET # BLD AUTO: 279 K/UL
PMV BLD AUTO: 8.2 FL
RBC # BLD AUTO: 3.14 M/UL
WBC # BLD AUTO: 3.65 K/UL

## 2018-10-05 PROCEDURE — 63600175 PHARM REV CODE 636 W HCPCS: Mod: JG,EC | Performed by: INTERNAL MEDICINE

## 2018-10-05 PROCEDURE — 96372 THER/PROPH/DIAG INJ SC/IM: CPT

## 2018-10-05 PROCEDURE — 36415 COLL VENOUS BLD VENIPUNCTURE: CPT

## 2018-10-05 PROCEDURE — 85025 COMPLETE CBC W/AUTO DIFF WBC: CPT

## 2018-10-05 RX ADMIN — ERYTHROPOIETIN 20000 UNITS: 20000 INJECTION, SOLUTION INTRAVENOUS; SUBCUTANEOUS at 11:10

## 2018-10-08 ENCOUNTER — HOSPITAL ENCOUNTER (OUTPATIENT)
Facility: OTHER | Age: 73
Discharge: HOME | End: 2018-10-08
Attending: ANESTHESIOLOGY | Admitting: ANESTHESIOLOGY
Payer: MEDICARE

## 2018-10-08 VITALS
DIASTOLIC BLOOD PRESSURE: 79 MMHG | RESPIRATION RATE: 18 BRPM | BODY MASS INDEX: 26.97 KG/M2 | HEART RATE: 79 BPM | HEIGHT: 57 IN | OXYGEN SATURATION: 95 % | SYSTOLIC BLOOD PRESSURE: 164 MMHG | TEMPERATURE: 98 F | WEIGHT: 125 LBS

## 2018-10-08 DIAGNOSIS — M51.36 DDD (DEGENERATIVE DISC DISEASE), LUMBAR: Primary | ICD-10-CM

## 2018-10-08 DIAGNOSIS — M54.16 LUMBAR RADICULOPATHY: ICD-10-CM

## 2018-10-08 DIAGNOSIS — M51.16 LUMBAR DISC HERNIATION WITH RADICULOPATHY: ICD-10-CM

## 2018-10-08 LAB — POCT GLUCOSE: 104 MG/DL (ref 70–110)

## 2018-10-08 PROCEDURE — 25500020 PHARM REV CODE 255: Performed by: ANESTHESIOLOGY

## 2018-10-08 PROCEDURE — 64483 NJX AA&/STRD TFRM EPI L/S 1: CPT | Mod: LT,,, | Performed by: ANESTHESIOLOGY

## 2018-10-08 PROCEDURE — 63600175 PHARM REV CODE 636 W HCPCS: Performed by: ANESTHESIOLOGY

## 2018-10-08 PROCEDURE — 82947 ASSAY GLUCOSE BLOOD QUANT: CPT | Performed by: ANESTHESIOLOGY

## 2018-10-08 PROCEDURE — 25000003 PHARM REV CODE 250: Performed by: ANESTHESIOLOGY

## 2018-10-08 PROCEDURE — 99152 MOD SED SAME PHYS/QHP 5/>YRS: CPT | Mod: ,,, | Performed by: ANESTHESIOLOGY

## 2018-10-08 PROCEDURE — 64483 NJX AA&/STRD TFRM EPI L/S 1: CPT | Performed by: ANESTHESIOLOGY

## 2018-10-08 RX ORDER — LIDOCAINE HYDROCHLORIDE 10 MG/ML
INJECTION, SOLUTION EPIDURAL; INFILTRATION; INTRACAUDAL; PERINEURAL
Status: DISCONTINUED | OUTPATIENT
Start: 2018-10-08 | End: 2018-10-08 | Stop reason: HOSPADM

## 2018-10-08 RX ORDER — LIDOCAINE HYDROCHLORIDE 10 MG/ML
INJECTION INFILTRATION; PERINEURAL
Status: DISCONTINUED | OUTPATIENT
Start: 2018-10-08 | End: 2018-10-08 | Stop reason: HOSPADM

## 2018-10-08 RX ORDER — FENTANYL CITRATE 50 UG/ML
INJECTION, SOLUTION INTRAMUSCULAR; INTRAVENOUS
Status: DISCONTINUED | OUTPATIENT
Start: 2018-10-08 | End: 2018-10-08 | Stop reason: HOSPADM

## 2018-10-08 RX ORDER — MIDAZOLAM HYDROCHLORIDE 1 MG/ML
INJECTION INTRAMUSCULAR; INTRAVENOUS
Status: DISCONTINUED | OUTPATIENT
Start: 2018-10-08 | End: 2018-10-08 | Stop reason: HOSPADM

## 2018-10-08 RX ORDER — SODIUM CHLORIDE 9 MG/ML
INJECTION, SOLUTION INTRAVENOUS CONTINUOUS
Status: DISCONTINUED | OUTPATIENT
Start: 2018-10-08 | End: 2018-10-08 | Stop reason: HOSPADM

## 2018-10-08 RX ADMIN — SODIUM CHLORIDE: 0.9 INJECTION, SOLUTION INTRAVENOUS at 09:10

## 2018-10-08 NOTE — DISCHARGE INSTRUCTIONS
Thank you for allowing us to care for you today. You may receive a survey about the care we provided. Your feedback is valuable and helps us provide excellent care throughout the community.     Home Care Instructions for Pain Management:    1. DIET:   You may resume your normal diet today.   2. BATHING:   You may shower with luke warm water. No tub baths or anything that will soak injection sites under water for the next 24 hours.  3. DRESSING:   You may remove your bandage today.   4. ACTIVITY LEVEL:   You may resume your normal activities 24 hrs after your procedure. Nothing strenuous today.  5. MEDICATIONS:   You may resume your normal medications today. To restart blood thinners, ask your doctor.  6. DRIVING    If you have received any sedatives by mouth today, you may not drive for 12 hours.    If you have received any sedation through your IV, you may not drive for 24 hrs.   7. SPECIAL INSTRUCTIONS:   No heat to the injection site for 24 hrs including, hot bath or shower, heating pad, moist heat, or hot tubs.    Use ice pack to injection site for any pain or discomfort.  Apply ice packs for 20 minute intervals as needed.    IF you have diabetes, be sure to monitor your blood sugar more closely. IF your injection contained steroids your blood sugar levels may become higher than normal.    If you are still having pain upon discharge:  Your pain may improve over the next 48 hours. The anesthetic (numbing medication) works immediately to 48 hours. IF your injection contained a steroid (anti-inflammatory medication), it takes approximately 3 days to start feeling relief and 7-10 days to see your greatest results from the medication. It is possible you may need subsequent injections. This would be discussed at your follow up appointment with pain management or your referring doctor.      PLEASE CALL YOUR DOCTOR IF:  1. Redness or swelling around the injection site.  2. Fever of 101 degrees or more  3. Drainage  (pus) from the injection site.  4. For any continuous bleeding (some dried blood over the incision is normal.)    FOR EMERGENCIES:   If any unusual problems or difficulties occur during clinic hours, call [phone] or [phone]. Adult Procedural Sedation Instructions    Recovery After Procedural Sedation (Adult)  You have been given medicine by vein to make you sleep during your surgery. This may have included both a pain medicine and sleeping medicine. Most of the effects have worn off. But you may still have some drowsiness for the next 6 to 8 hours.  Home care  Follow these guidelines when you get home:  · For the next 8 hours, you should be watched by a responsible adult. This person should make sure your condition is not getting worse.  · Don't drink any alcohol for the next 24 hours.  · Don't drive, operate dangerous machinery, or make important business or personal decisions during the next 24 hours.  Note: Your healthcare provider may tell you not to take any medicine by mouth for pain or sleep in the next 4 hours. These medicines may react with the medicines you were given in the hospital. This could cause a much stronger response than usual.  Follow-up care  Follow up with your healthcare provider if you are not alert and back to your usual level of activity within 12 hours.  When to seek medical advice  Call your healthcare provider right away if any of these occur:  · Drowsiness gets worse  · Weakness or dizziness gets worse  · Repeated vomiting  · You can't be awakened   Date Last Reviewed: 10/18/2016  © [id number] [employer]. [address] [place] [address], [place] [address]. All rights reserved. This information is not intended as a substitute for professional medical care. Always follow your healthcare professional's instructions.

## 2018-10-08 NOTE — DISCHARGE SUMMARY
Discharge Diagnosis:DDD (degenerative disc disease), lumbar [M51.36]  Lumbar disc herniation with radiculopathy [M51.16]  Condition on Discharge: Stable.  Diet on Discharge: Same as before.  Activity: as per instruction sheet.  Discharge to: Home with a responsible adult.  Follow up: 2-4 weeks &/or as per Discharge instructions

## 2018-10-08 NOTE — OP NOTE
Date of Service: 10/08/2018    PCP: Micaela Mendoza MD      Time-out taken to identify patient and procedure side prior to starting the procedure.   I attest that I have reviewed the patient's home medications prior to the procedure and no contraindication have been identified. I  re-evaluated the patient after the patient was positioned for the procedure in the procedure room immediately before the procedural time-out. The vital signs are current and represent the current state of the patient which has not significantly changed since the preprocedure assessment.                                                           PROCEDURE: Left L5 transforaminal epidural steroid injection under fluoroscopy    REASON FOR PROCEDURE: Left DDD (degenerative disc disease), lumbar [M51.36]  Lumbar disc herniation with radiculopathy [M51.16]  1. DDD (degenerative disc disease), lumbar    2. Lumbar disc herniation with radiculopathy    3. Lumbar radiculopathy      POSTPROCEDURE DIAGNOSIS:   DDD (degenerative disc disease), lumbar [M51.36]  Lumbar disc herniation with radiculopathy [M51.16]    1. DDD (degenerative disc disease), lumbar    2. Lumbar disc herniation with radiculopathy    3. Lumbar radiculopathy           PHYSICIAN: Alfonso Richards MD  ASSISTANTS:Crystal Romero MD    MEDICATIONS INJECTED:  Preservative-free dexamethasone 10mg, Xylocaine 1% MPF 3-5ml. 3ml per level. Preservative free, sterile normal saline is used to get larger volume as needed.  LOCAL ANESTHETIC INJECTED:  Xylocaine 1% 9ml with Sodium Bicarbonate 1ml. 3ml per site.    SEDATION MEDICATIONS: Versed 2mg IV, Fentanyl 25mcg IV  ESTIMATED BLOOD LOSS:  None.    COMPLICATIONS:  None.    TECHNIQUE:   Laying in a prone position, the patient was prepped and draped in the usual sterile fashion using ChloraPrep and fenestrated drape.  The area to be injected was determined under fluoroscopic guidance.  Local anesthetic was given by raising a wheel and going down to  the hub of a 27-gauge 1.25 inch needle.  The 3.5inch 22-gauge spinal needle was introduced towards the transverse process of each above named nerve root level.  The needle was walked medially then hinged into the neural foramen.  Omnipaque was injected to confirm appropriate placement and that there was no vascular runoff.  The medication was then injected after applying negative pressure. The patient tolerated the procedure well.    PAIN BEFORE THE PROCEDURE: 9/10.    PAIN AFTER THE PROCEDURE: 0/10.    The patient was monitored after the procedure.  Patient was given post procedure and discharge instructions to follow at home.  We will see the patient back in two weeks or the patient may call to inform of status. The patient was discharged in a stable condition.

## 2018-10-08 NOTE — H&P
"HPI  Regino Mcdaniel is a 72 year old woman with a past medical history of HTN, DM 2, CKD3, sarcoidosis, DJD, here with lumbar radiculopathy    PMHx, PSHx, Allergies, Medications reviewed in epic    ROS negative except pain complaints in HPI    OBJECTIVE:    BP (!) 161/93 (BP Location: Right arm, Patient Position: Lying)   Pulse 82   Temp 97.9 °F (36.6 °C) (Oral)   Resp 18   Ht 4' 9" (1.448 m)   Wt 56.7 kg (125 lb)   LMP  (LMP Unknown)   SpO2 97%   BMI 27.05 kg/m²     PHYSICAL EXAMINATION:    GENERAL: Well appearing, in no acute distress, alert and oriented x3.  PSYCH:  Mood and affect appropriate.  SKIN: Skin color, texture, turgor normal, no rashes or lesions.  CV: RRR with palpation of the radial artery.  PULM: No evidence of respiratory difficulty, symmetric chest rise. Clear to auscultation.  NEURO: Cranial nerves grossly intact.    Plan:    Proceed with procedure as planned    Valentino Quiroz  10/08/2018      "

## 2018-10-14 ENCOUNTER — PATIENT MESSAGE (OUTPATIENT)
Dept: FAMILY MEDICINE | Facility: CLINIC | Age: 73
End: 2018-10-14

## 2018-10-14 DIAGNOSIS — E11.9 DIABETES MELLITUS, TYPE 2: Chronic | ICD-10-CM

## 2018-10-14 DIAGNOSIS — E11.22 DIABETES MELLITUS WITH STAGE 3 CHRONIC KIDNEY DISEASE: ICD-10-CM

## 2018-10-14 DIAGNOSIS — N18.30 DIABETES MELLITUS WITH STAGE 3 CHRONIC KIDNEY DISEASE: ICD-10-CM

## 2018-10-15 RX ORDER — METFORMIN HYDROCHLORIDE 1000 MG/1
1000 TABLET ORAL 2 TIMES DAILY WITH MEALS
Qty: 180 TABLET | Refills: 1 | Status: SHIPPED | OUTPATIENT
Start: 2018-10-15 | End: 2019-06-25 | Stop reason: SDUPTHER

## 2018-10-19 ENCOUNTER — INFUSION (OUTPATIENT)
Dept: INFUSION THERAPY | Facility: HOSPITAL | Age: 73
End: 2018-10-19
Attending: INTERNAL MEDICINE
Payer: MEDICARE

## 2018-10-19 VITALS
TEMPERATURE: 98 F | OXYGEN SATURATION: 98 % | RESPIRATION RATE: 17 BRPM | BODY MASS INDEX: 27.05 KG/M2 | WEIGHT: 125 LBS | SYSTOLIC BLOOD PRESSURE: 138 MMHG | HEART RATE: 87 BPM | DIASTOLIC BLOOD PRESSURE: 62 MMHG

## 2018-10-19 DIAGNOSIS — Z53.1 REFUSAL OF BLOOD TRANSFUSIONS AS PATIENT IS JEHOVAH'S WITNESS: ICD-10-CM

## 2018-10-19 DIAGNOSIS — N18.9 ANEMIA IN CHRONIC KIDNEY DISEASE, UNSPECIFIED CKD STAGE: Primary | ICD-10-CM

## 2018-10-19 DIAGNOSIS — D63.1 ANEMIA IN CHRONIC KIDNEY DISEASE, UNSPECIFIED CKD STAGE: Primary | ICD-10-CM

## 2018-10-19 PROCEDURE — 63600175 PHARM REV CODE 636 W HCPCS: Mod: JG,EC | Performed by: INTERNAL MEDICINE

## 2018-10-19 PROCEDURE — 96372 THER/PROPH/DIAG INJ SC/IM: CPT

## 2018-10-19 RX ADMIN — ERYTHROPOIETIN 20000 UNITS: 20000 INJECTION, SOLUTION INTRAVENOUS; SUBCUTANEOUS at 11:10

## 2018-10-24 ENCOUNTER — OFFICE VISIT (OUTPATIENT)
Dept: RHEUMATOLOGY | Facility: CLINIC | Age: 73
End: 2018-10-24
Payer: MEDICARE

## 2018-10-24 VITALS
SYSTOLIC BLOOD PRESSURE: 137 MMHG | DIASTOLIC BLOOD PRESSURE: 83 MMHG | HEIGHT: 57 IN | HEART RATE: 94 BPM | BODY MASS INDEX: 27.05 KG/M2

## 2018-10-24 DIAGNOSIS — Z79.52 CURRENT USE OF STEROID MEDICATION: ICD-10-CM

## 2018-10-24 DIAGNOSIS — D86.9 SARCOIDOSIS: Primary | Chronic | ICD-10-CM

## 2018-10-24 DIAGNOSIS — R53.83 FATIGUE, UNSPECIFIED TYPE: ICD-10-CM

## 2018-10-24 DIAGNOSIS — G72.9 MYOPATHY: ICD-10-CM

## 2018-10-24 DIAGNOSIS — D84.9 IMMUNOSUPPRESSION: Chronic | ICD-10-CM

## 2018-10-24 PROCEDURE — 3075F SYST BP GE 130 - 139MM HG: CPT | Mod: CPTII,,, | Performed by: INTERNAL MEDICINE

## 2018-10-24 PROCEDURE — 1101F PT FALLS ASSESS-DOCD LE1/YR: CPT | Mod: CPTII,,, | Performed by: INTERNAL MEDICINE

## 2018-10-24 PROCEDURE — 99212 OFFICE O/P EST SF 10 MIN: CPT | Mod: PBBFAC | Performed by: INTERNAL MEDICINE

## 2018-10-24 PROCEDURE — 99214 OFFICE O/P EST MOD 30 MIN: CPT | Mod: S$PBB,,, | Performed by: INTERNAL MEDICINE

## 2018-10-24 PROCEDURE — 3079F DIAST BP 80-89 MM HG: CPT | Mod: CPTII,,, | Performed by: INTERNAL MEDICINE

## 2018-10-24 PROCEDURE — 99999 PR PBB SHADOW E&M-EST. PATIENT-LVL II: CPT | Mod: PBBFAC,,, | Performed by: INTERNAL MEDICINE

## 2018-10-24 ASSESSMENT — ROUTINE ASSESSMENT OF PATIENT INDEX DATA (RAPID3)
TOTAL RAPID3 SCORE: 6.33
WHEN YOU AWAKENED IN THE MORNING OVER THE LAST WEEK, PLEASE INDICATE THE AMOUNT OF TIME IT TAKES UNTIL YOU ARE AS LIMBER AS YOU WILL BE FOR THE DAY: 20 MINUTES
PAIN SCORE: 8.5
AM STIFFNESS SCORE: 1, YES
FATIGUE SCORE: 10
MDHAQ FUNCTION SCORE: .6
PATIENT GLOBAL ASSESSMENT SCORE: 8.5
PSYCHOLOGICAL DISTRESS SCORE: 1.1

## 2018-10-24 NOTE — PROGRESS NOTES
"Subjective:       Patient ID: Regino Lawrence is a 72 y.o. female.    Chief Complaint: Sarcoidosis    HPI:  Regino Lawrence is a 72 y.o. female with history of sarcoidosis with associated myopathy and   arthropathy. Sarcoidosis that was first manifested by muscle inflammation, low white   blood cell count, hair loss, skin involvement. She was treated in the   past with methotrexate and Plaquenil, both of which were ineffective.   Cellcept and Imuran caused some unknown side effect (she thinks it made her sick).   Colchicine was held due to low WBC.   Although methotrexate did not help in past it was retried and she felt it helped hair growth but did not help body aches.   She held MTX due to an URI but patient has not wanted restarted since then (2013).   Leflunomide was held due to elevated BP after less than a week of use.    Interval History:   Has lumbar spine injection for disc disease.  She may require surgery.   Hands swell periodically.  Darker appearance to right hand.  Swelling in hands.  Decreased blood in right hand when checking finger stick.   Pain >10/10 ache in hands.  Feet no longer hurt.  Pain medication helps some.  Activity worsens.      Review of Systems   Constitutional: Positive for fatigue.   HENT:        Dry mouth   Eyes:        Dry eyes   Respiratory: Positive for shortness of breath.    Cardiovascular: Negative.    Gastrointestinal: Negative.    Endocrine: Negative.    Genitourinary: Negative.    Musculoskeletal: Positive for arthralgias and back pain.   Skin: Negative.    Allergic/Immunologic: Negative.    Neurological: Negative.    Hematological: Negative.    Psychiatric/Behavioral: Negative.          Objective:   /83   Pulse 94   Ht 4' 9" (1.448 m)   LMP  (LMP Unknown)   BMI 27.05 kg/m²      Physical Exam   Constitutional: She is oriented to person, place, and time and well-developed, well-nourished, and in no distress.   HENT:   Head: Normocephalic and atraumatic.   Eyes: " Conjunctivae and EOM are normal.   Cardiovascular: Normal rate, regular rhythm and normal heart sounds.    Pulmonary/Chest: Effort normal and breath sounds normal.   Abdominal: Soft. Bowel sounds are normal.   Neurological: She is alert and oriented to person, place, and time.   Slow careful gait.  Walker in room   Skin: Skin is warm and dry.     Psychiatric: Mood and affect normal.   Musculoskeletal:   4.5/5 UE and LE proximal strength bilaterally (seems stronger than last visit but not 5/5 yet)  28 joint count: 10 tender and 10 swollen.  Swollen and tender MCPs          LABS    Component      Latest Ref Rng & Units 10/19/2018 10/8/2018 10/5/2018 9/26/2018   WBC      3.90 - 12.70 K/uL 6.80  3.65 (L) 3.86 (L)   RBC      4.00 - 5.40 M/uL 3.19 (L)  3.14 (L) 3.17 (L)   Hemoglobin      12.0 - 16.0 g/dL 10.5 (L)  10.2 (L) 10.4 (L)   Hematocrit      37.0 - 48.5 % 31.9 (L)  31.5 (L) 32.9 (L)   MCV      82 - 98 fL 100 (H)  100 (H) 104 (H)   MCH      27.0 - 31.0 pg 32.9 (H)  32.5 (H) 32.8 (H)   MCHC      32.0 - 36.0 g/dL 32.9  32.4 31.6 (L)   RDW      11.5 - 14.5 % 14.0  14.0 14.5   Platelets      150 - 350 K/uL 291  279 345   MPV      9.2 - 12.9 fL 8.7 (L)  8.2 (L) 9.3   Immature Granulocytes      0.0 - 0.5 %    0.3   Gran # (ANC)      1.8 - 7.7 K/uL 4.3  2.1 1.9   Immature Grans (Abs)      0.00 - 0.04 K/uL    0.01   Lymph #      1.0 - 4.8 K/uL 2.0  1.1 1.5   Mono #      0.3 - 1.0 K/uL 0.4  0.4 0.5   Eos #      0.0 - 0.5 K/uL 0.1  0.1 0.0   Baso #      0.00 - 0.20 K/uL 0.02  0.01 0.01   nRBC      0 /100 WBC    0   Gran%      38.0 - 73.0 % 63.9  56.6 48.1   Lymph%      18.0 - 48.0 % 29.1  31.0 37.8   Mono%      4.0 - 15.0 % 5.7  9.6 12.7   Eosinophil%      0.0 - 8.0 % 1.0  2.5 0.8   Basophil%      0.0 - 1.9 % 0.3  0.3 0.3   Differential Method       Automated  Automated Automated   Sodium      136 - 145 mmol/L       Potassium      3.5 - 5.1 mmol/L       Chloride      95 - 110 mmol/L       CO2      23 - 29 mmol/L        Glucose      70 - 110 mg/dL       BUN, Bld      8 - 23 mg/dL       Creatinine      0.5 - 1.4 mg/dL       Calcium      8.7 - 10.5 mg/dL       Total Protein      6.0 - 8.4 g/dL       Albumin      3.5 - 5.2 g/dL       Total Bilirubin      0.1 - 1.0 mg/dL       Alkaline Phosphatase      55 - 135 U/L       AST      10 - 40 U/L       ALT      10 - 44 U/L       Anion Gap      8 - 16 mmol/L       eGFR if African American      >60 mL/min/1.73 m:2       eGFR if non African American      >60 mL/min/1.73 m:2       Iron      30 - 160 ug/dL    135   Transferrin      200 - 375 mg/dL    265   TIBC      250 - 450 ug/dL    392   Saturated Iron      20 - 50 %    34   Aldolase      1.2 - 7.6 U/L       CPK      20 - 180 U/L       CRP      0.0 - 8.2 mg/L       Sed Rate      0 - 36 mm/Hr       Ferritin      20.0 - 300.0 ng/mL    65   POCT Glucose      70 - 110 mg/dL  104       Component      Latest Ref Rng & Units 9/10/2018   WBC      3.90 - 12.70 K/uL 4.81   RBC      4.00 - 5.40 M/uL 3.30 (L)   Hemoglobin      12.0 - 16.0 g/dL 10.8 (L)   Hematocrit      37.0 - 48.5 % 33.5 (L)   MCV      82 - 98 fL 102 (H)   MCH      27.0 - 31.0 pg 32.7 (H)   MCHC      32.0 - 36.0 g/dL 32.2   RDW      11.5 - 14.5 % 13.9   Platelets      150 - 350 K/uL 278   MPV      9.2 - 12.9 fL 9.6   Immature Granulocytes      0.0 - 0.5 % 1.0 (H)   Gran # (ANC)      1.8 - 7.7 K/uL 2.8   Immature Grans (Abs)      0.00 - 0.04 K/uL 0.05 (H)   Lymph #      1.0 - 4.8 K/uL 1.3   Mono #      0.3 - 1.0 K/uL 0.5   Eos #      0.0 - 0.5 K/uL 0.1   Baso #      0.00 - 0.20 K/uL 0.03   nRBC      0 /100 WBC 0   Gran%      38.0 - 73.0 % 58.0   Lymph%      18.0 - 48.0 % 27.9   Mono%      4.0 - 15.0 % 11.0   Eosinophil%      0.0 - 8.0 % 1.5   Basophil%      0.0 - 1.9 % 0.6   Differential Method       Automated   Sodium      136 - 145 mmol/L 139   Potassium      3.5 - 5.1 mmol/L 3.5   Chloride      95 - 110 mmol/L 102   CO2      23 - 29 mmol/L 27   Glucose      70 - 110 mg/dL 124 (H)    BUN, Bld      8 - 23 mg/dL 17   Creatinine      0.5 - 1.4 mg/dL 1.1   Calcium      8.7 - 10.5 mg/dL 9.1   Total Protein      6.0 - 8.4 g/dL 6.2   Albumin      3.5 - 5.2 g/dL 3.4 (L)   Total Bilirubin      0.1 - 1.0 mg/dL 0.3   Alkaline Phosphatase      55 - 135 U/L 52 (L)   AST      10 - 40 U/L 14   ALT      10 - 44 U/L 11   Anion Gap      8 - 16 mmol/L 10   eGFR if African American      >60 mL/min/1.73 m:2 58.0 (A)   eGFR if non African American      >60 mL/min/1.73 m:2 50.3 (A)   Iron      30 - 160 ug/dL    Transferrin      200 - 375 mg/dL    TIBC      250 - 450 ug/dL    Saturated Iron      20 - 50 %    Aldolase      1.2 - 7.6 U/L 1.8   CPK      20 - 180 U/L 114   CRP      0.0 - 8.2 mg/L 3.6   Sed Rate      0 - 36 mm/Hr 10   Ferritin      20.0 - 300.0 ng/mL    POCT Glucose      70 - 110 mg/dL         Assessment:       1.   Sarcoidosis. Manifested by myopathy and arthropathy. Persistent joint pain and myalgias despite prednisone 10 mg.  Now with diffuse body pain    2.   Myalgia and myositis.    3.   Osteopenia. Took Fosamax for 5 years stopped 6/2013.    4.   Fatigue     5.   Diabetes mellitus type 2 in nonobese     6.           Neck pain. X-ray with degenerative changes. S/p laminectomy-cervical fusion C3-C7 11/16/2015 for cervical spinal stenosis.    7.           Back pain    8.           HTN.  9.           SOB.  When blood count low  10.         Anemia.  On Procrit    Plan:       1. Labs   2. Consider taper from prednisone 10 mg alternating 5 mg to 5 mg daily daily.  Consider increasing methotrexate to 6 tabs.  Continue folic acid.   Repeat labs one month after starting MTX.  3. [employer] stopped tramadol.  Now on hydrocodone/acetaminophen from primary doctor.   4. Following with nephrology  5. DEXA with osteopenia of hip total and femoral neck. FRAX does not suggest treatment however with prednisone>7.5 mg will consider Prolia (due renal insufficiency).  Information provided for patient to review.  She read  information but still needs to see dentist to have teeth pulled.  6.  Follow with Dr. Conner regarding spine issues.                 RTO in 3-4 months.    Patient seen face to face for 25 minutes and greater than 50% spent in counseling regarding Joint pains,   management of sarcoidosis and pain.

## 2018-11-02 ENCOUNTER — LAB VISIT (OUTPATIENT)
Dept: LAB | Facility: HOSPITAL | Age: 73
End: 2018-11-02
Attending: INTERNAL MEDICINE
Payer: MEDICARE

## 2018-11-02 DIAGNOSIS — D86.9 SARCOIDOSIS: ICD-10-CM

## 2018-11-02 DIAGNOSIS — D63.1 ANEMIA IN CHRONIC KIDNEY DISEASE, UNSPECIFIED CKD STAGE: ICD-10-CM

## 2018-11-02 DIAGNOSIS — N18.9 ANEMIA IN CHRONIC KIDNEY DISEASE, UNSPECIFIED CKD STAGE: ICD-10-CM

## 2018-11-02 DIAGNOSIS — M79.10 MYALGIA: ICD-10-CM

## 2018-11-02 LAB
ALBUMIN SERPL BCP-MCNC: 3.5 G/DL
ALP SERPL-CCNC: 63 U/L
ALT SERPL W/O P-5'-P-CCNC: 21 U/L
ANION GAP SERPL CALC-SCNC: 10 MMOL/L
AST SERPL-CCNC: 22 U/L
BASOPHILS # BLD AUTO: 0.01 K/UL
BASOPHILS # BLD AUTO: 0.01 K/UL
BASOPHILS NFR BLD: 0.3 %
BASOPHILS NFR BLD: 0.3 %
BILIRUB SERPL-MCNC: 0.3 MG/DL
BUN SERPL-MCNC: 12 MG/DL
CALCIUM SERPL-MCNC: 9.2 MG/DL
CHLORIDE SERPL-SCNC: 105 MMOL/L
CK SERPL-CCNC: 77 U/L
CO2 SERPL-SCNC: 25 MMOL/L
CREAT SERPL-MCNC: 1 MG/DL
CRP SERPL-MCNC: 6.9 MG/L
DIFFERENTIAL METHOD: ABNORMAL
DIFFERENTIAL METHOD: ABNORMAL
EOSINOPHIL # BLD AUTO: 0.1 K/UL
EOSINOPHIL # BLD AUTO: 0.1 K/UL
EOSINOPHIL NFR BLD: 3 %
EOSINOPHIL NFR BLD: 3 %
ERYTHROCYTE [DISTWIDTH] IN BLOOD BY AUTOMATED COUNT: 14.1 %
ERYTHROCYTE [DISTWIDTH] IN BLOOD BY AUTOMATED COUNT: 14.1 %
ERYTHROCYTE [SEDIMENTATION RATE] IN BLOOD BY WESTERGREN METHOD: 29 MM/HR
EST. GFR  (AFRICAN AMERICAN): >60 ML/MIN/1.73 M^2
EST. GFR  (NON AFRICAN AMERICAN): 56 ML/MIN/1.73 M^2
GLUCOSE SERPL-MCNC: 143 MG/DL
HCT VFR BLD AUTO: 34.7 %
HCT VFR BLD AUTO: 34.7 %
HGB BLD-MCNC: 11.3 G/DL
HGB BLD-MCNC: 11.3 G/DL
LYMPHOCYTES # BLD AUTO: 1.2 K/UL
LYMPHOCYTES # BLD AUTO: 1.2 K/UL
LYMPHOCYTES NFR BLD: 36.4 %
LYMPHOCYTES NFR BLD: 36.4 %
MCH RBC QN AUTO: 32.8 PG
MCH RBC QN AUTO: 32.8 PG
MCHC RBC AUTO-ENTMCNC: 32.6 G/DL
MCHC RBC AUTO-ENTMCNC: 32.6 G/DL
MCV RBC AUTO: 101 FL
MCV RBC AUTO: 101 FL
MONOCYTES # BLD AUTO: 0.2 K/UL
MONOCYTES # BLD AUTO: 0.2 K/UL
MONOCYTES NFR BLD: 5.8 %
MONOCYTES NFR BLD: 5.8 %
NEUTROPHILS # BLD AUTO: 1.8 K/UL
NEUTROPHILS # BLD AUTO: 1.8 K/UL
NEUTROPHILS NFR BLD: 54.5 %
NEUTROPHILS NFR BLD: 54.5 %
PLATELET # BLD AUTO: 292 K/UL
PLATELET # BLD AUTO: 292 K/UL
PMV BLD AUTO: 8.8 FL
PMV BLD AUTO: 8.8 FL
POTASSIUM SERPL-SCNC: 3.6 MMOL/L
PROT SERPL-MCNC: 6.8 G/DL
RBC # BLD AUTO: 3.45 M/UL
RBC # BLD AUTO: 3.45 M/UL
SODIUM SERPL-SCNC: 140 MMOL/L
WBC # BLD AUTO: 3.3 K/UL
WBC # BLD AUTO: 3.3 K/UL

## 2018-11-02 PROCEDURE — 82085 ASSAY OF ALDOLASE: CPT | Mod: HCNC

## 2018-11-02 PROCEDURE — 82550 ASSAY OF CK (CPK): CPT

## 2018-11-02 PROCEDURE — 85025 COMPLETE CBC W/AUTO DIFF WBC: CPT

## 2018-11-02 PROCEDURE — 86140 C-REACTIVE PROTEIN: CPT

## 2018-11-02 PROCEDURE — 85652 RBC SED RATE AUTOMATED: CPT

## 2018-11-02 PROCEDURE — 36415 COLL VENOUS BLD VENIPUNCTURE: CPT

## 2018-11-02 PROCEDURE — 80053 COMPREHEN METABOLIC PANEL: CPT

## 2018-11-07 LAB — ALDOLASE SERPL-CCNC: 2.7 U/L

## 2018-11-13 ENCOUNTER — OFFICE VISIT (OUTPATIENT)
Dept: NEUROSURGERY | Facility: CLINIC | Age: 73
End: 2018-11-13
Payer: MEDICARE

## 2018-11-13 VITALS
DIASTOLIC BLOOD PRESSURE: 79 MMHG | SYSTOLIC BLOOD PRESSURE: 147 MMHG | HEIGHT: 57 IN | HEART RATE: 94 BPM | TEMPERATURE: 98 F | BODY MASS INDEX: 26.97 KG/M2 | WEIGHT: 125 LBS

## 2018-11-13 DIAGNOSIS — M48.062 LUMBAR STENOSIS WITH NEUROGENIC CLAUDICATION: ICD-10-CM

## 2018-11-13 DIAGNOSIS — M51.36 DEGENERATIVE DISC DISEASE, LUMBAR: ICD-10-CM

## 2018-11-13 DIAGNOSIS — M54.50 CHRONIC BILATERAL LOW BACK PAIN WITHOUT SCIATICA: ICD-10-CM

## 2018-11-13 DIAGNOSIS — G89.29 CHRONIC BILATERAL LOW BACK PAIN WITHOUT SCIATICA: ICD-10-CM

## 2018-11-13 DIAGNOSIS — M51.16 LUMBAR DISC HERNIATION WITH RADICULOPATHY: Primary | ICD-10-CM

## 2018-11-13 PROCEDURE — 3078F DIAST BP <80 MM HG: CPT | Mod: CPTII,HCNC,S$GLB, | Performed by: PHYSICIAN ASSISTANT

## 2018-11-13 PROCEDURE — 3077F SYST BP >= 140 MM HG: CPT | Mod: CPTII,HCNC,S$GLB, | Performed by: PHYSICIAN ASSISTANT

## 2018-11-13 PROCEDURE — 99999 PR PBB SHADOW E&M-EST. PATIENT-LVL IV: CPT | Mod: PBBFAC,HCNC,, | Performed by: PHYSICIAN ASSISTANT

## 2018-11-13 PROCEDURE — 99213 OFFICE O/P EST LOW 20 MIN: CPT | Mod: HCNC,S$GLB,, | Performed by: PHYSICIAN ASSISTANT

## 2018-11-13 PROCEDURE — 1101F PT FALLS ASSESS-DOCD LE1/YR: CPT | Mod: CPTII,HCNC,S$GLB, | Performed by: PHYSICIAN ASSISTANT

## 2018-11-13 NOTE — PROGRESS NOTES
"[facility] - Neurosurgery [facility]  Neurosurgery  Established Patient    Patient Name: Regino Lawrence  MRN: [id number]  Primary Care Provider: Micaela Mendoza MD    Subjective:     Chief Complaint/Reason for Admission: Low back and leg pain     History of Present Illness:   Ms. Lawrence is a 72 year old female with a history of cervical spinal stenosis s/p posterior C3-C7 laminectomy and fusion on 11/16/15 by Dr. Conner. She was last seen in clinic on 9/26/18. At that time, she continued to have complaints of left sided back, hip, and leg pain that was present with standing and resolved with sitting. She was referred to Dr. Richards for a left L5 transforaminal injection which was completed on 10/8/18. She presents to clinic today with her daughter to discuss symptoms after the injection. Patient reports 100% improvement in her left sided back and LLE pain/paresthesias after the injection. Her left leg also felt stronger and no longer "gave out" on her. These results lasted about 3.5 weeks before the pain started to return. Currently, her pain is back to pre injection severity. She denies any improvement in her right sided low back pain with the injection. She also states that her RLE continued to feel weak and as if it was going to "give out". Her pain and leg weakness increase with standing/walking and decrease with rest/sitting. Denies any bladder or bowel incontinence.       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, without long-term current use of insulin     Depression     Diabetes mellitus, " type 2     Eye injury as a child     k-abrasion  od    Hyperlipidemia     Hypertension     Hypothyroidism     Immune deficiency disorder     Immune disorder     Myalgia and myositis 2012    Osteoporosis     Polyneuropathy     Renal manifestation of secondary diabetes mellitus     Sarcoidosis     Ulcer     no cancer    Urinary incontinence      Past Surgical History:   Procedure Laterality Date    CARPAL TUNNEL RELEASE      Rt wrist    CATARACT EXTRACTION W/  INTRAOCULAR LENS IMPLANT Right 2015    Dr. Azevedo    CATARACT EXTRACTION W/  INTRAOCULAR LENS IMPLANT Left 2015    Dr. Azevedo     SECTION      CHOLECYSTECTOMY      COLPOCLEISIS-- [place] N/A 2016    [facility] at [facility] OR    CYSTOSCOPY N/A 2016    [facility] at [facility] OR    Injection,steroid,epidural,transforaminal approach  Left L5-S1 Left 10/8/2018    Performed by Alfonso Richards MD at [facility] PAIN MGT    INSERTION-INTRAOCULAR LENS (IOL) Left 2015    [facility] OR    INSERTION-INTRAOCULAR LENS (IOL) Right 2015    [facility] at [facility] OR    LAMINECTOMY-CERVICAL/FUSION-POSTERIOR C3-C7 N/A 2015    Performed [facility] Fab [facility] at [facility] OR 2ND FLR    PHACOEMULSIFICATION-ASPIRATION-CATARACT Left 2015    [facility] at [facility] OR    PHACOEMULSIFICATION-ASPIRATION-CATARACT Right 2015    [facility] at [facility] OR    REPAIR-POSTERIOR N/A 2016    [facility]formed by [facility] at [facility] OR    SLING-TRANSOBTERATOR TAPE N/A 2016    [facility]formed by [facility] at [facility] OR    TUBAL LIGATION       Family History     Problem Relation (Age of Onset)    Arthritis Sister    Cancer Sister    Cataracts Mother    Glaucoma Sister    Hepatitis Sister    Hypertension Mother, Maternal Grandmother     Immunodeficiency Sister    Kidney failure Sister    No Known Problems Father, Brother, Maternal Aunt, Maternal Uncle, Paternal Aunt, Paternal Uncle, Maternal Grandfather, Paternal Grandmother, Paternal Grandfather        Tobacco Use    Smoking status: Never Smoker    Smokeless tobacco: Never Used   Substance and Sexual Activity    Alcohol use: No    Drug use: No    Sexual activity: Yes     Partners: Male     Review of Systems  Objective:     Vitals:    11/13/18 1220   BP: (!) 147/79   Pulse: 94   Temp: 98.4 °F (36.9 °C)         Neurosurgery Physical Exam  General: well developed, well nourished, no distress.   Head: normocephalic, atraumatic  Neurologic: Alert and oriented. Thought content appropriate.  GCS: Motor: 6/Verbal: 5/Eyes: 4 GCS Total: 15  Mental Status: Awake, Alert, Oriented x 4  Language: No aphasia  Speech: No dysarthria  Cranial nerves: face symmetric, tongue midline, CN II-XII grossly intact.   Eyes: pupils equal, round, reactive to light with accomodation, EOMI.   Pulmonary: normal respirations, no signs of respiratory distress  Abdomen: soft, non-distended, not tender to palpation  Sensory: intact to light touch throughout  Motor Strength:Moves all extremities spontaneously with good tone.  Full strength upper and lower extremities. No abnormal movements seen.   Silverman: absent  Clonus: absent  Skin: Skin is warm, dry and intact.  Straight leg raise: negative  Gait: slow, antalgic, stooped forward while ambulated                    Lumbar ROM: decreased 2/2 pain       Mild TTP of lumbar spine, midline, around L4-S1        SI Joint tenderness: Negative              Tenderness with external/internal hip rotation: Negative.  Greater trochanter TTP: present bilaterally          Significant Diagnostics:  None    Assessment/Plan:     Assessment:  Ms. Lawrence is a 72 year old female with a history of cervical spinal stenosis s/p posterior C3-C7 laminectomy and fusion on 11/16/15 by Dr. Conner, with left  sided back, hip, and leg pain that is present with standing and resolves with sitting. MRI shows an L5/S1 HNP that abuts the left L5 nerve root.      Plan:  -Patient neurologically stable on exam  -Patient with 100% improvement in left sided pain after injection. Will repeat injection but perform bilaterally to see if the RLE improves. Patient with known knee and hip issues. She may need to follow up with Ortho in the future.   -Patient was instructed to hold all ASA containing products in preparation for injection. She voiced understanding.   -RTC in 2-3 months to discuss pain after 2nd injection  -Encouraged patient to call the clinic with any questions, concerns, or exam changes prior to follow up appt.       BENITA Hi  Neurosurgery  [facility] - Neurosurgery [address]th Fl

## 2018-11-15 DIAGNOSIS — M51.16 LUMBAR DISC HERNIATION WITH RADICULOPATHY: Primary | ICD-10-CM

## 2018-11-15 DIAGNOSIS — M51.36 DDD (DEGENERATIVE DISC DISEASE), LUMBAR: ICD-10-CM

## 2018-11-16 ENCOUNTER — INFUSION (OUTPATIENT)
Dept: INFUSION THERAPY | Facility: HOSPITAL | Age: 73
End: 2018-11-16
Attending: INTERNAL MEDICINE
Payer: MEDICARE

## 2018-11-16 VITALS
TEMPERATURE: 98 F | HEART RATE: 88 BPM | WEIGHT: 125 LBS | RESPIRATION RATE: 18 BRPM | OXYGEN SATURATION: 95 % | DIASTOLIC BLOOD PRESSURE: 74 MMHG | SYSTOLIC BLOOD PRESSURE: 152 MMHG | BODY MASS INDEX: 27.05 KG/M2

## 2018-11-16 DIAGNOSIS — D63.1 ANEMIA IN CHRONIC KIDNEY DISEASE, UNSPECIFIED CKD STAGE: Primary | ICD-10-CM

## 2018-11-16 DIAGNOSIS — Z53.1 REFUSAL OF BLOOD TRANSFUSIONS AS PATIENT IS JEHOVAH'S WITNESS: ICD-10-CM

## 2018-11-16 DIAGNOSIS — N18.9 ANEMIA IN CHRONIC KIDNEY DISEASE, UNSPECIFIED CKD STAGE: Primary | ICD-10-CM

## 2018-11-16 PROCEDURE — 63600175 PHARM REV CODE 636 W HCPCS: Mod: JG,HCNC | Performed by: INTERNAL MEDICINE

## 2018-11-16 PROCEDURE — 96372 THER/PROPH/DIAG INJ SC/IM: CPT | Mod: HCNC

## 2018-11-16 RX ADMIN — ERYTHROPOIETIN 20000 UNITS: 20000 INJECTION, SOLUTION INTRAVENOUS; SUBCUTANEOUS at 11:11

## 2018-11-16 NOTE — PLAN OF CARE
Problem: Patient Care Overview (Adult)  Goal: Plan of Care Review  Outcome: Ongoing (interventions implemented as appropriate)  Patient received Procrit. Tolerated well. VSS. Received discharge instructions and verbalized understanding.

## 2018-11-23 ENCOUNTER — PATIENT MESSAGE (OUTPATIENT)
Dept: RHEUMATOLOGY | Facility: CLINIC | Age: 73
End: 2018-11-23

## 2018-11-23 DIAGNOSIS — D86.9 SARCOIDOSIS: Chronic | ICD-10-CM

## 2018-11-23 DIAGNOSIS — G72.9 MYOPATHY: ICD-10-CM

## 2018-11-23 RX ORDER — METHOTREXATE 2.5 MG/1
TABLET ORAL
Qty: 48 TABLET | Refills: 0 | Status: SHIPPED | OUTPATIENT
Start: 2018-11-23 | End: 2019-01-22 | Stop reason: SDUPTHER

## 2018-11-23 RX ORDER — METHOTREXATE 2.5 MG/1
TABLET ORAL
Qty: 48 TABLET | Refills: 0 | Status: SHIPPED | OUTPATIENT
Start: 2018-11-23 | End: 2018-11-23 | Stop reason: SDUPTHER

## 2018-11-23 RX ORDER — METHOTREXATE 2.5 MG/1
TABLET ORAL
Qty: 48 TABLET | Refills: 0 | Status: CANCELLED | OUTPATIENT
Start: 2018-11-23

## 2018-11-26 LAB
GENETIC COUNSELING?: YES
GENSO SPECIMEN TYPE: NORMAL
MISCELLANEOUS GENETIC TEST NAME: NORMAL
PARTENTAL OR SIBLING TESTING?: YES
REFERENCE LAB: NORMAL
TEST RESULT: NORMAL

## 2018-11-30 ENCOUNTER — INFUSION (OUTPATIENT)
Dept: INFUSION THERAPY | Facility: HOSPITAL | Age: 73
End: 2018-11-30
Attending: INTERNAL MEDICINE
Payer: MEDICARE

## 2018-11-30 VITALS
RESPIRATION RATE: 18 BRPM | HEART RATE: 96 BPM | DIASTOLIC BLOOD PRESSURE: 72 MMHG | SYSTOLIC BLOOD PRESSURE: 139 MMHG | TEMPERATURE: 97 F

## 2018-11-30 DIAGNOSIS — D63.1 ANEMIA IN CHRONIC KIDNEY DISEASE, UNSPECIFIED CKD STAGE: Primary | ICD-10-CM

## 2018-11-30 DIAGNOSIS — N18.9 ANEMIA IN CHRONIC KIDNEY DISEASE, UNSPECIFIED CKD STAGE: Primary | ICD-10-CM

## 2018-11-30 DIAGNOSIS — Z53.1 REFUSAL OF BLOOD TRANSFUSIONS AS PATIENT IS JEHOVAH'S WITNESS: ICD-10-CM

## 2018-11-30 PROCEDURE — 96372 THER/PROPH/DIAG INJ SC/IM: CPT | Mod: HCNC

## 2018-11-30 PROCEDURE — 63600175 PHARM REV CODE 636 W HCPCS: Mod: JG,HCNC | Performed by: INTERNAL MEDICINE

## 2018-11-30 RX ADMIN — ERYTHROPOIETIN 20000 UNITS: 20000 INJECTION, SOLUTION INTRAVENOUS; SUBCUTANEOUS at 11:11

## 2018-11-30 NOTE — PLAN OF CARE
Problem: Patient Care Overview (Adult)  Goal: Plan of Care Review  Tolerated Procrit. Pt reports some fatigue, NAD.

## 2018-12-01 ENCOUNTER — LAB VISIT (OUTPATIENT)
Dept: LAB | Facility: HOSPITAL | Age: 73
End: 2018-12-01
Attending: INTERNAL MEDICINE
Payer: MEDICARE

## 2018-12-01 DIAGNOSIS — N18.9 CHRONIC KIDNEY DISEASE, UNSPECIFIED CKD STAGE: ICD-10-CM

## 2018-12-01 DIAGNOSIS — D63.1 ANEMIA IN CHRONIC KIDNEY DISEASE, UNSPECIFIED CKD STAGE: ICD-10-CM

## 2018-12-01 DIAGNOSIS — N18.9 ANEMIA IN CHRONIC KIDNEY DISEASE, UNSPECIFIED CKD STAGE: ICD-10-CM

## 2018-12-01 LAB
BASOPHILS # BLD AUTO: 0.03 K/UL
BASOPHILS NFR BLD: 0.6 %
DIFFERENTIAL METHOD: ABNORMAL
EOSINOPHIL # BLD AUTO: 0.1 K/UL
EOSINOPHIL NFR BLD: 1.9 %
ERYTHROCYTE [DISTWIDTH] IN BLOOD BY AUTOMATED COUNT: 14.6 %
FERRITIN SERPL-MCNC: 91 NG/ML
HCT VFR BLD AUTO: 30.7 %
HGB BLD-MCNC: 9.6 G/DL
IMM GRANULOCYTES # BLD AUTO: 0.05 K/UL
IMM GRANULOCYTES NFR BLD AUTO: 1 %
IRON SERPL-MCNC: 52 UG/DL
LYMPHOCYTES # BLD AUTO: 1.5 K/UL
LYMPHOCYTES NFR BLD: 30.5 %
MCH RBC QN AUTO: 32.3 PG
MCHC RBC AUTO-ENTMCNC: 31.3 G/DL
MCV RBC AUTO: 103 FL
MONOCYTES # BLD AUTO: 0.5 K/UL
MONOCYTES NFR BLD: 10.5 %
NEUTROPHILS # BLD AUTO: 2.7 K/UL
NEUTROPHILS NFR BLD: 55.5 %
NRBC BLD-RTO: 0 /100 WBC
PLATELET # BLD AUTO: 312 K/UL
PMV BLD AUTO: 9.5 FL
RBC # BLD AUTO: 2.97 M/UL
SATURATED IRON: 14 %
TOTAL IRON BINDING CAPACITY: 369 UG/DL
TRANSFERRIN SERPL-MCNC: 249 MG/DL
WBC # BLD AUTO: 4.85 K/UL

## 2018-12-01 PROCEDURE — 82728 ASSAY OF FERRITIN: CPT | Mod: HCNC

## 2018-12-01 PROCEDURE — 83540 ASSAY OF IRON: CPT | Mod: HCNC

## 2018-12-01 PROCEDURE — 85025 COMPLETE CBC W/AUTO DIFF WBC: CPT | Mod: HCNC

## 2018-12-01 PROCEDURE — 36415 COLL VENOUS BLD VENIPUNCTURE: CPT | Mod: HCNC,PO

## 2018-12-03 ENCOUNTER — TELEPHONE (OUTPATIENT)
Dept: HEMATOLOGY/ONCOLOGY | Facility: CLINIC | Age: 73
End: 2018-12-03

## 2018-12-04 ENCOUNTER — OFFICE VISIT (OUTPATIENT)
Dept: HEMATOLOGY/ONCOLOGY | Facility: CLINIC | Age: 73
End: 2018-12-04
Payer: MEDICARE

## 2018-12-04 VITALS
BODY MASS INDEX: 24.93 KG/M2 | HEIGHT: 61 IN | SYSTOLIC BLOOD PRESSURE: 122 MMHG | WEIGHT: 132.06 LBS | OXYGEN SATURATION: 99 % | HEART RATE: 80 BPM | DIASTOLIC BLOOD PRESSURE: 64 MMHG | TEMPERATURE: 98 F

## 2018-12-04 DIAGNOSIS — E61.1 IRON DEFICIENCY: ICD-10-CM

## 2018-12-04 DIAGNOSIS — D63.1 ANEMIA IN CHRONIC KIDNEY DISEASE, UNSPECIFIED CKD STAGE: Primary | ICD-10-CM

## 2018-12-04 DIAGNOSIS — N18.9 ANEMIA IN CHRONIC KIDNEY DISEASE, UNSPECIFIED CKD STAGE: Primary | ICD-10-CM

## 2018-12-04 PROCEDURE — 3078F DIAST BP <80 MM HG: CPT | Mod: CPTII,HCNC,S$GLB, | Performed by: INTERNAL MEDICINE

## 2018-12-04 PROCEDURE — 99213 OFFICE O/P EST LOW 20 MIN: CPT | Mod: HCNC,S$GLB,, | Performed by: INTERNAL MEDICINE

## 2018-12-04 PROCEDURE — 99999 PR PBB SHADOW E&M-EST. PATIENT-LVL IV: CPT | Mod: PBBFAC,HCNC,, | Performed by: INTERNAL MEDICINE

## 2018-12-04 PROCEDURE — 3074F SYST BP LT 130 MM HG: CPT | Mod: CPTII,HCNC,S$GLB, | Performed by: INTERNAL MEDICINE

## 2018-12-04 PROCEDURE — 1101F PT FALLS ASSESS-DOCD LE1/YR: CPT | Mod: CPTII,HCNC,S$GLB, | Performed by: INTERNAL MEDICINE

## 2018-12-04 RX ORDER — PREDNISONE 10 MG/1
TABLET ORAL
COMMUNITY
Start: 2018-11-23 | End: 2019-06-12 | Stop reason: SDUPTHER

## 2018-12-04 NOTE — PROGRESS NOTES
Subjective:       Patient ID: Regino Lawrence is a 73 y.o. female.    Chief Complaint: No chief complaint on file.  Diagnosis: Anemia in CKD  Patient is a Faith  .  HPI    The patient is seen today for chronic anemia in CKD. The patient reports that she has been diagnosed with JOLLY in the past.  She has been on oral iron supplementation therapy, but could not tolerate or did not respond, she is uncertain.  She is followed by GI and has undergone a colonoscopy earlier this year and was diagnosed with hemorrhoids for which she underwent banding procedure.  No melena, hematochezia,change in bowel habits.  She has also been diagnosed with B12 deficiency in the past, but reports she did not respond to B12 injections. No history of blood transfusions.  She is a Faith.  She reports that she remembers getting injections in the 1970s when her blood count was low.        She is followed by Rheumatology for history of sarcoidosis with associated myopathy and arthropathy.   .She has been treated in the  past with methotrexate and Plaquenil, both of which were ineffective  Cellcept and imuran caused some unknown side effect.   Colchicine  held due to low WBC   She continues on chronic steroid therapy, Prednisone 10mg qd    Today, she has no new issues  She  continues with Chronic diffuse arthralgias  Mild  Chronic fatigue-stable  No SOB/CP/cough   She continues to undergo Procrit therapy q 2wks  She undergoes intermittent IV iron therapy      Ms. Lawrence is a 72 year old female with a history of cervical spinal stenosis s/p posterior C3-C7 laminectomy and fusion on 11/16/15 by Dr. Conner. She was last seen in clinic on 9/26/18. At that time, she continued to have complaints of left sided back, hip, and leg pain that was present with standing and resolved with sitting. She was referred to Dr. Richards for a left L5 transforaminal injection which was completed on 10/8/18.             CBC  reveals wbc 4850/mm3  Hb  "9.6  g/dl Hct 30.7  % Plt ct 312k    Patient was in the ED 2018 and was seen on   at [facility] for back issues  She is followed by Neurosurgery for cervical spinal stenosis s/p posterior C3-C7 laminectomy and fusion          PAST MEDICAL HISTORY:  Acid reflux, alopecia, anemia, anxiety disorder, chronic  kidney disease, depression, diabetes mellitus type 2, hyperlipidemia,  hypertension, hypothyroidism, osteoporosis, sarcoidosis.    PAST SURGICAL HISTORY:  Cholecystectomy, , tubal ligation, carpal tunnel release, cataracts.    FAMILY HISTORY: Unremarkable for cancer. Significant for HTN.       Review of Systems   Constitutional: Positive for fatigue. Negative for activity change and appetite change.   HENT: Negative for hearing loss and nosebleeds.    Eyes: Negative for visual disturbance.   Respiratory: Negative for cough and shortness of breath.    Cardiovascular: Negative for chest pain and leg swelling.   Gastrointestinal: Negative for abdominal pain, constipation, diarrhea and nausea.   Genitourinary: Negative for flank pain and urgency.   Musculoskeletal: Positive for arthralgias and back pain. Negative for gait problem and joint swelling.   Skin: Negative for rash.        No petechiae, ecchymoses   Neurological: Negative for light-headedness and headaches.   Hematological: Negative for adenopathy. Does not bruise/bleed easily.       Objective:       Vitals:    18 0947   BP: 122/64   BP Location: Right arm   Patient Position: Sitting   BP Method: Medium (Manual)   Pulse: 80   Temp: 97.8 °F (36.6 °C)   TempSrc: Oral   SpO2: 99%   Weight: 59.9 kg (132 lb 0.9 oz)   Height: 5' 0.5" (1.537 m)       Physical Exam   Constitutional: She is oriented to person, place, and time. She appears well-developed and well-nourished.   HENT:   Head: Normocephalic.   Mouth/Throat: Oropharynx is clear and moist. No oropharyngeal exudate.   Eyes: Conjunctivae and lids are normal. Pupils are equal, round, and " reactive to light. No scleral icterus.   Neck: Normal range of motion. Neck supple. No thyromegaly present.   Cardiovascular: Normal rate, regular rhythm and normal heart sounds.   No murmur heard.  Pulmonary/Chest: Breath sounds normal. She has no wheezes. She has no rales.   Abdominal: Soft. Bowel sounds are normal. She exhibits no distension and no mass. There is no hepatosplenomegaly. There is no tenderness. There is no rebound and no guarding.   Musculoskeletal: Normal range of motion. She exhibits no edema or tenderness.   Lymphadenopathy:     She has no cervical adenopathy.     She has no axillary adenopathy.        Right: No supraclavicular adenopathy present.        Left: No supraclavicular adenopathy present.   Neurological: She is alert and oriented to person, place, and time. No cranial nerve deficit. Coordination normal.   Skin: Skin is warm and dry. No ecchymosis, no petechiae and no rash noted. No erythema.   Psychiatric: She has a normal mood and affect.             Lab Results   Component Value Date    WBC 4.85 12/01/2018    HGB 9.6 (L) 12/01/2018    HCT 30.7 (L) 12/01/2018     (H) 12/01/2018     12/01/2018     Lab Results   Component Value Date    IRON 52 12/01/2018    TIBC 369 12/01/2018    FERRITIN 91 12/01/2018     SPEP-nl          CT renal 3/6/2017   IMPRESSION:  1.  No renal, ureteral or bladder calculi.  No hydronephrosis or ureterectasis.  2.  Poorly distended bladder.  Mild bladder wall prominence.  Mild cystitis cannot be   excluded.  3.  Moderate constipation.  Normal appendix      Lab Results   Component Value Date    IRON 52 12/01/2018    TIBC 369 12/01/2018    FERRITIN 91 12/01/2018       Assessment:       1. Anemia in chronic kidney disease, unspecified CKD stage    2. Iron deficiency        Plan:   1-2  Pt clinically stable  Pt is a Hinduism and declines/not interested in blood and blood products due to Nondenominational beliefs  Hb 9.6   g/dl   Ferritin 91 Fe sats  13  Cont procrit to 20,000u q 2wk  Pt followed by Nephrology . It has been determinedearly CKD stage III, suspect due to age-related renal nephron loss, along with possible diabetic nephropathy v. hypertensive nephrosclerosis  Plan Feraheme    CBC q2wks STANDING    Follow-up with PCP for med mgmt    F/u 2 mos with Fe studies        CC: Micaela Mendoza M.D.

## 2018-12-04 NOTE — Clinical Note
Cbc q sweeks prior to procrit q 2wks -standingSchedule feraheme x 2 ( orders completed)Cbc, Fe studies piror to f/u

## 2018-12-06 ENCOUNTER — HOSPITAL ENCOUNTER (OUTPATIENT)
Facility: OTHER | Age: 73
Discharge: HOME | End: 2018-12-06
Attending: ANESTHESIOLOGY | Admitting: ANESTHESIOLOGY
Payer: MEDICARE

## 2018-12-06 VITALS
OXYGEN SATURATION: 98 % | SYSTOLIC BLOOD PRESSURE: 183 MMHG | DIASTOLIC BLOOD PRESSURE: 91 MMHG | BODY MASS INDEX: 23.92 KG/M2 | HEART RATE: 96 BPM | TEMPERATURE: 98 F | HEIGHT: 62 IN | WEIGHT: 130 LBS | RESPIRATION RATE: 20 BRPM

## 2018-12-06 DIAGNOSIS — G89.29 CHRONIC BILATERAL LOW BACK PAIN WITH LEFT-SIDED SCIATICA: Primary | ICD-10-CM

## 2018-12-06 DIAGNOSIS — M54.42 CHRONIC BILATERAL LOW BACK PAIN WITH LEFT-SIDED SCIATICA: Primary | ICD-10-CM

## 2018-12-06 DIAGNOSIS — M54.16 LUMBAR RADICULOPATHY: ICD-10-CM

## 2018-12-06 LAB — POCT GLUCOSE: 65 MG/DL (ref 70–110)

## 2018-12-06 PROCEDURE — 25000003 PHARM REV CODE 250: Mod: HCNC | Performed by: ANESTHESIOLOGY

## 2018-12-06 PROCEDURE — 64483 NJX AA&/STRD TFRM EPI L/S 1: CPT | Mod: 50,HCNC | Performed by: ANESTHESIOLOGY

## 2018-12-06 PROCEDURE — 25000003 PHARM REV CODE 250: Mod: HCNC | Performed by: PHYSICAL MEDICINE & REHABILITATION

## 2018-12-06 PROCEDURE — 63600175 PHARM REV CODE 636 W HCPCS: Mod: HCNC | Performed by: ANESTHESIOLOGY

## 2018-12-06 PROCEDURE — 64483 NJX AA&/STRD TFRM EPI L/S 1: CPT | Mod: 50,HCNC,, | Performed by: ANESTHESIOLOGY

## 2018-12-06 PROCEDURE — 25500020 PHARM REV CODE 255: Mod: HCNC | Performed by: ANESTHESIOLOGY

## 2018-12-06 PROCEDURE — 99152 MOD SED SAME PHYS/QHP 5/>YRS: CPT | Mod: HCNC,,, | Performed by: ANESTHESIOLOGY

## 2018-12-06 RX ORDER — LIDOCAINE HYDROCHLORIDE 10 MG/ML
INJECTION, SOLUTION EPIDURAL; INFILTRATION; INTRACAUDAL; PERINEURAL
Status: DISCONTINUED | OUTPATIENT
Start: 2018-12-06 | End: 2018-12-06 | Stop reason: HOSPADM

## 2018-12-06 RX ORDER — MIDAZOLAM HYDROCHLORIDE 1 MG/ML
INJECTION INTRAMUSCULAR; INTRAVENOUS
Status: DISCONTINUED | OUTPATIENT
Start: 2018-12-06 | End: 2018-12-06 | Stop reason: HOSPADM

## 2018-12-06 RX ORDER — DEXAMETHASONE SODIUM PHOSPHATE 100 MG/10ML
INJECTION INTRAMUSCULAR; INTRAVENOUS
Status: DISCONTINUED | OUTPATIENT
Start: 2018-12-06 | End: 2018-12-06 | Stop reason: HOSPADM

## 2018-12-06 RX ORDER — LIDOCAINE HYDROCHLORIDE 10 MG/ML
INJECTION INFILTRATION; PERINEURAL
Status: DISCONTINUED | OUTPATIENT
Start: 2018-12-06 | End: 2018-12-06 | Stop reason: HOSPADM

## 2018-12-06 RX ORDER — FENTANYL CITRATE 50 UG/ML
INJECTION, SOLUTION INTRAMUSCULAR; INTRAVENOUS
Status: DISCONTINUED | OUTPATIENT
Start: 2018-12-06 | End: 2018-12-06 | Stop reason: HOSPADM

## 2018-12-06 RX ORDER — SODIUM CHLORIDE 9 MG/ML
500 INJECTION, SOLUTION INTRAVENOUS CONTINUOUS
Status: ACTIVE | OUTPATIENT
Start: 2018-12-06 | End: 2018-12-07

## 2018-12-06 RX ADMIN — SODIUM CHLORIDE 500 ML: 0.9 INJECTION, SOLUTION INTRAVENOUS at 01:12

## 2018-12-06 NOTE — OP NOTE
Date of Service: 12/06/2018    PCP: Micaela Mendoza MD  Time-out taken to identify patient and procedure side prior to starting the procedure.   I attest that I have reviewed the patient's home medications prior to the procedure and no contraindication have been identified. I  re-evaluated the patient after the patient was positioned for the procedure in the procedure room immediately before the procedural time-out. The vital signs are current and represent the current state of the patient which has not significantly changed since the preprocedure assessment.                                                           PROCEDURE: Bilateral L5 transforaminal epidural steroid injection under fluoroscopy    REASON FOR PROCEDURE: Bilateral Lumbar disc herniation with radiculopathy [M51.16]  DDD (degenerative disc disease), lumbar [M51.36]  1. Chronic bilateral low back pain with left-sided sciatica    2. Lumbar radiculopathy      POSTPROCEDURE DIAGNOSIS:   Lumbar disc herniation with radiculopathy [M51.16]  DDD (degenerative disc disease), lumbar [M51.36]    1. Chronic bilateral low back pain with left-sided sciatica    2. Lumbar radiculopathy           PHYSICIAN: Alfonso Richards MD  ASSISTANTS:Breezy Gabriel MD    MEDICATIONS INJECTED:  Preservative-free dexamethasone 10mg, Xylocaine 1% MPF 3-5ml. 3ml per level. Preservative free, sterile normal saline is used to get larger volume as needed.  LOCAL ANESTHETIC INJECTED:  Xylocaine 1% 9ml with Sodium Bicarbonate 1ml. 3ml per site.    SEDATION MEDICATIONS: 2mg Versed IV and 50mcg Fentanyl IV  ESTIMATED BLOOD LOSS:  None.    COMPLICATIONS:  None.    TECHNIQUE:   Laying in a prone position, the patient was prepped and draped in the usual sterile fashion using ChloraPrep and fenestrated drape.  The area to be injected was determined under fluoroscopic guidance.  Local anesthetic was given by raising a wheel and going down to the hub of a 27-gauge 1.25 inch needle.  The 3.5inch  22-gauge spinal needle was introduced towards the transverse process of each above named nerve root level.  The needle was walked medially then hinged into the neural foramen.  Omnipaque was injected to confirm appropriate placement and that there was no vascular runoff.  The medication was then injected after applying negative pressure. The patient tolerated the procedure well.    PAIN BEFORE THE PROCEDURE: 10/10.    PAIN AFTER THE PROCEDURE: 0/10.    The patient was monitored after the procedure.  Patient was given post procedure and discharge instructions to follow at home.  We will see the patient back in two weeks or the patient may call to inform of status. The patient was discharged in a stable condition.

## 2018-12-06 NOTE — H&P
"HPI  Patient presenting for  Procedure(s) (LRB):  Injection,steroid,epidural,transforaminal approach    Bilateral L5 (Bilateral)    No health changes since previous encounter    PMHx, PSHx, Allergies, Medications reviewed in epic    ROS negative except pain complaints in HPI    OBJECTIVE:    BP (!) 195/98   Pulse 89   Temp 97.9 °F (36.6 °C) (Oral)   Resp 18   Ht 5' 2" (1.575 m)   Wt 59 kg (130 lb)   LMP  (LMP Unknown)   SpO2 99%   Breastfeeding? No   BMI 23.78 kg/m²     PHYSICAL EXAMINATION:    GENERAL: Well appearing, in no acute distress, alert and oriented x3.  PSYCH:  Mood and affect appropriate.  SKIN: Skin color, texture, turgor normal, no rashes or lesions.  CV: RRR with palpation of the radial artery.  PULM: No evidence of respiratory difficulty, symmetric chest rise. Clear to auscultation.  NEURO: Cranial nerves grossly intact.    Plan:    Proceed with procedure as planned    Breezy Gabriel  12/06/2018    "

## 2018-12-06 NOTE — DISCHARGE SUMMARY
Discharge Diagnosis:Lumbar disc herniation with radiculopathy [M51.16]  DDD (degenerative disc disease), lumbar [M51.36]  Condition on Discharge: Stable.  Diet on Discharge: Same as before.  Activity: as per instruction sheet.  Discharge to: Home with a responsible adult.  Follow up: 2-4 weeks &/or as per Discharge instructions

## 2018-12-06 NOTE — DISCHARGE INSTRUCTIONS
Thank you for allowing us to care for you today. You may receive a survey about the care we provided. Your feedback is valuable and helps us provide excellent care throughout the community.     Home Care Instructions for Pain Management:    1. DIET:   You may resume your normal diet today.   2. BATHING:   You may shower with luke warm water. No tub baths or anything that will soak injection sites under water for the next 24 hours.  3. DRESSING:   You may remove your bandage today.   4. ACTIVITY LEVEL:   You may resume your normal activities 24 hrs after your procedure. Nothing strenuous today.  5. MEDICATIONS:   You may resume your normal medications today. To restart blood thinners, ask your doctor.  6. DRIVING    If you have received any sedatives by mouth today, you may not drive for 12 hours.    If you have received any sedation through your IV, you may not drive for 24 hrs.   7. SPECIAL INSTRUCTIONS:   No heat to the injection site for 24 hrs including, hot bath or shower, heating pad, moist heat, or hot tubs.    Use ice pack to injection site for any pain or discomfort.  Apply ice packs for 20 minute intervals as needed.    IF you have diabetes, be sure to monitor your blood sugar more closely. IF your injection contained steroids your blood sugar levels may become higher than normal.    If you are still having pain upon discharge:  Your pain may improve over the next 48 hours. The anesthetic (numbing medication) works immediately to 48 hours. IF your injection contained a steroid (anti-inflammatory medication), it takes approximately 3 days to start feeling relief and 7-10 days to see your greatest results from the medication. It is possible you may need subsequent injections. This would be discussed at your follow up appointment with pain management or your referring doctor.      PLEASE CALL YOUR DOCTOR IF:  1. Redness or swelling around the injection site.  2. Fever of 101 degrees or more  3. Drainage  (pus) from the injection site.  4. For any continuous bleeding (some dried blood over the incision is normal.)    FOR EMERGENCIES:   If any unusual problems or difficulties occur during clinic hours, call [phone] or [phone]. Adult Procedural Sedation Instructions    Recovery After Procedural Sedation (Adult)  You have been given medicine by vein to make you sleep during your surgery. This may have included both a pain medicine and sleeping medicine. Most of the effects have worn off. But you may still have some drowsiness for the next 6 to 8 hours.  Home care  Follow these guidelines when you get home:  · For the next 8 hours, you should be watched by a responsible adult. This person should make sure your condition is not getting worse.  · Don't drink any alcohol for the next 24 hours.  · Don't drive, operate dangerous machinery, or make important business or personal decisions during the next 24 hours.  Note: Your healthcare provider may tell you not to take any medicine by mouth for pain or sleep in the next 4 hours. These medicines may react with the medicines you were given in the hospital. This could cause a much stronger response than usual.  Follow-up care  Follow up with your healthcare provider if you are not alert and back to your usual level of activity within 12 hours.  When to seek medical advice  Call your healthcare provider right away if any of these occur:  · Drowsiness gets worse  · Weakness or dizziness gets worse  · Repeated vomiting  · You can't be awakened   Date Last Reviewed: 10/18/2016  © [id number] [employer]. [address] [place] [address], [place] [address]. All rights reserved. This information is not intended as a substitute for professional medical care. Always follow your healthcare professional's instructions.

## 2018-12-07 DIAGNOSIS — E11.8 CONTROLLED DIABETES MELLITUS TYPE 2 WITH COMPLICATIONS: ICD-10-CM

## 2018-12-07 RX ORDER — LANCING DEVICE/LANCETS
KIT MISCELLANEOUS
Qty: 1 EACH | Refills: 0 | Status: SHIPPED | OUTPATIENT
Start: 2018-12-07

## 2018-12-12 ENCOUNTER — OFFICE VISIT (OUTPATIENT)
Dept: FAMILY MEDICINE | Facility: CLINIC | Age: 73
End: 2018-12-12
Payer: MEDICARE

## 2018-12-12 VITALS
WEIGHT: 129.44 LBS | BODY MASS INDEX: 23.82 KG/M2 | OXYGEN SATURATION: 97 % | HEIGHT: 62 IN | HEART RATE: 96 BPM | DIASTOLIC BLOOD PRESSURE: 80 MMHG | RESPIRATION RATE: 18 BRPM | TEMPERATURE: 98 F | SYSTOLIC BLOOD PRESSURE: 138 MMHG

## 2018-12-12 DIAGNOSIS — M51.16 LUMBAR DISC HERNIATION WITH RADICULOPATHY: ICD-10-CM

## 2018-12-12 DIAGNOSIS — Z74.09 IMPAIRED MOBILITY: ICD-10-CM

## 2018-12-12 DIAGNOSIS — G89.29 CHRONIC LEFT HIP PAIN: ICD-10-CM

## 2018-12-12 DIAGNOSIS — M79.672 LEFT FOOT PAIN: ICD-10-CM

## 2018-12-12 DIAGNOSIS — I10 ESSENTIAL HYPERTENSION: Chronic | ICD-10-CM

## 2018-12-12 DIAGNOSIS — M25.552 CHRONIC LEFT HIP PAIN: ICD-10-CM

## 2018-12-12 DIAGNOSIS — M51.36 DEGENERATIVE DISC DISEASE, LUMBAR: Primary | ICD-10-CM

## 2018-12-12 PROCEDURE — 1101F PT FALLS ASSESS-DOCD LE1/YR: CPT | Mod: CPTII,HCNC,S$GLB, | Performed by: FAMILY MEDICINE

## 2018-12-12 PROCEDURE — 3079F DIAST BP 80-89 MM HG: CPT | Mod: CPTII,HCNC,S$GLB, | Performed by: FAMILY MEDICINE

## 2018-12-12 PROCEDURE — 99999 PR PBB SHADOW E&M-EST. PATIENT-LVL V: CPT | Mod: PBBFAC,HCNC,, | Performed by: FAMILY MEDICINE

## 2018-12-12 PROCEDURE — 99214 OFFICE O/P EST MOD 30 MIN: CPT | Mod: HCNC,S$GLB,, | Performed by: FAMILY MEDICINE

## 2018-12-12 PROCEDURE — 3075F SYST BP GE 130 - 139MM HG: CPT | Mod: CPTII,HCNC,S$GLB, | Performed by: FAMILY MEDICINE

## 2018-12-12 RX ORDER — CARVEDILOL 12.5 MG/1
12.5 TABLET ORAL 2 TIMES DAILY
Qty: 180 TABLET | Refills: 1 | Status: SHIPPED | OUTPATIENT
Start: 2018-12-12 | End: 2018-12-26

## 2018-12-12 RX ORDER — HYDROCODONE BITARTRATE AND ACETAMINOPHEN 5; 325 MG/1; MG/1
1 TABLET ORAL EVERY 6 HOURS PRN
Qty: 90 TABLET | Refills: 0 | Status: SHIPPED | OUTPATIENT
Start: 2019-01-12 | End: 2019-11-04 | Stop reason: SDUPTHER

## 2018-12-12 RX ORDER — HYDROCODONE BITARTRATE AND ACETAMINOPHEN 5; 325 MG/1; MG/1
1 TABLET ORAL EVERY 6 HOURS PRN
Qty: 90 TABLET | Refills: 0 | Status: SHIPPED | OUTPATIENT
Start: 2018-12-12 | End: 2019-04-12 | Stop reason: SDUPTHER

## 2018-12-12 RX ORDER — HYDROCODONE BITARTRATE AND ACETAMINOPHEN 5; 325 MG/1; MG/1
1 TABLET ORAL EVERY 6 HOURS PRN
Qty: 90 TABLET | Refills: 0 | Status: SHIPPED | OUTPATIENT
Start: 2019-02-12 | End: 2019-09-25 | Stop reason: SDUPTHER

## 2018-12-12 NOTE — PROGRESS NOTES
Chief Complaint   Patient presents with    Chronic Pain    Diabetes    Leg Swelling     hip/foot left       HPI  Regino Lawrence is a 73 y.o. female with multiple medical diagnoses as listed in the medical history and problem list that presents for follow-up for diabetes, HTN, and left leg pain in her hip and foot    DM- sugars have been controlled    HTN- blood pressure has been running higher since she has been in pain over the past month    Left leg pain, hip pain and foot pain- she is being treated with pain management for her spine and her lower back pain is responding to injections but she has been having pain in her hip and the top of her foot that is worse with walking    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, without long-term current use of insulin     Depression     Diabetes mellitus, type 2     Eye injury as a child     k-abrasion  od    Hyperlipidemia     Hypertension     Hypothyroidism     Immune deficiency disorder     Immune disorder     Myalgia and myositis 2012    Osteoporosis     Polyneuropathy     Renal manifestation of secondary diabetes mellitus     Sarcoidosis     Ulcer     no cancer    Urinary incontinence        PAST SURGICAL HISTORY:  Past Surgical History:   Procedure Laterality Date    CARPAL TUNNEL RELEASE      Rt wrist    CATARACT EXTRACTION W/  INTRAOCULAR LENS IMPLANT Right 2015    Dr. Azevedo    CATARACT EXTRACTION W/  INTRAOCULAR LENS IMPLANT Left 2015    Dr. Azevedo     SECTION      CHOLECYSTECTOMY      COLPOCLEISIS-- [place] N/A 2016    [facility]    CYSTOSCOPY N/A 2016    [facility] Meredith  [facility]    Injection,steroid,epidural,transforaminal approach    Bilateral L5 Bilateral 12/6/2018    [facility] Richards [facility]    Injection,steroid,epidural,transforaminal approach  Left L5-S1 Left 10/8/2018    [facility]    INSERTION-INTRAOCULAR LENS (IOL) Left 5/21/2015    [facility] OR    INSERTION-INTRAOCULAR LENS (IOL) Right 4/30/2015    [facility] at [facility] OR    LAMINECTOMY-CERVICAL/FUSION-POSTERIOR C3-C7 N/A 11/16/2015    [facility] OR 2ND FLR    PHACOEMULSIFICATION-ASPIRATION-CATARACT Left 5/21/2015    [facility] at [facility] OR    PHACOEMULSIFICATION-ASPIRATION-CATARACT Right 4/30/2015    [facility] at [facility] OR    REPAIR-POSTERIOR N/A 11/28/2016    [facility]    SLING-TRANSOBTERATOR TAPE N/A 11/28/2016    [facility]    TUBAL LIGATION         SOCIAL HISTORY:  Social History     Socioeconomic History    Marital status:      Spouse name: Not on file    Number of children: Not on file    Years of education: Not on file    Highest education level: Not on file   Social Needs    Financial resource strain: Not on file    Food insecurity - worry: Not on file    Food insecurity - inability: Not on file    Transportation needs - medical: Not on file    Transportation needs - non-medical: Not on file   Occupational History    Not on file   Tobacco Use    Smoking status: Never Smoker    Smokeless tobacco: Never Used   Substance and Sexual Activity    Alcohol use: No    Drug use: No    Sexual activity: Yes     Partners: Male   Other Topics Concern    Not on file   Social History Narrative    Not on file       FAMILY HISTORY:  Family History   Problem Relation Age of Onset    Hypertension Mother      Cataracts Mother     No Known Problems Father     Hypertension Maternal Grandmother     Glaucoma Sister     Arthritis Sister     No Known Problems Brother     No Known Problems Maternal Aunt     No Known Problems Maternal Uncle     No Known Problems Paternal Aunt     No Known Problems Paternal Uncle     No Known Problems Maternal Grandfather     No Known Problems Paternal Grandmother     No Known Problems Paternal Grandfather     Kidney failure Sister     Hepatitis Sister     Cancer Sister         bone cancer     Immunodeficiency Sister     Lupus Neg Hx     Rheum arthritis Neg Hx     Amblyopia Neg Hx     Blindness Neg Hx     Diabetes Neg Hx     Macular degeneration Neg Hx     Retinal detachment Neg Hx     Strabismus Neg Hx     Stroke Neg Hx     Thyroid disease Neg Hx     Endometrial cancer Neg Hx     Vaginal cancer Neg Hx     Cervical cancer Neg Hx        ALLERGIES AND MEDICATIONS: updated and reviewed.  Review of patient's allergies indicates:  No Known Allergies  Current Outpatient Medications   Medication Sig Dispense Refill    ACCU-CHEK FASTCLIX LANCING DEV Kit USE AS DIRECTED. 1 each 0    ACCU-CHEK SMARTVIEW TEST STRIP Strp TEST  ONCE  A   strip 11    ALCOHOL ANTISEPTIC PADS (ALCOHOL PREP PADS TOP)       amLODIPine (NORVASC) 10 MG tablet TAKE ONE TABLET BY MOUTH ONCE DAILY 90 tablet 0    blood-glucose meter kit Use as instructed 1 each 0    calcium carbonate (OS-MALCOLM) 600 mg calcium (1,500 mg) Tab Take 1 tablet by mouth 2 (two) times daily.       carvedilol (COREG) 12.5 MG tablet Take 1 tablet (12.5 mg total) by mouth 2 (two) times daily. 180 tablet 1    cloNIDine (CATAPRES) 0.3 MG tablet Take 1 tablet (0.3 mg total) by mouth 3 (three) times daily. 270 tablet 1    cyclobenzaprine (FLEXERIL) 10 MG tablet Take 1 tablet (10 mg total) by mouth 3 (three) times daily as needed for Muscle spasms. 270 tablet 0    epoetin german (PROCRIT INJ) Inject 1,000 Units as directed.       fenofibrate 160 MG Tab Take 1 tablet (160 mg total) by mouth once daily. 90 tablet 1    fexofenadine (ALLEGRA ALLERGY) 180 MG tablet Take 180 mg by mouth daily as needed.       folic acid (FOLVITE) 1 MG tablet Take 1 tablet (1 mg total) by mouth once daily. 90 tablet 0    gabapentin (NEURONTIN) 400 MG capsule Take 1 capsule (400 mg total) by mouth 2 (two) times daily. 180 capsule 1    HYDROcodone-acetaminophen (NORCO) 5-325 mg per tablet Take 1 tablet by mouth every 6 (six) hours as needed. 90 tablet 0    [START ON 2/12/2019] HYDROcodone-acetaminophen (NORCO) 5-325 mg per tablet Take 1 tablet by mouth every 6 (six) hours as needed. 90 tablet 0    [START ON 1/12/2019] HYDROcodone-acetaminophen (NORCO) 5-325 mg per tablet Take 1 tablet by mouth every 6 (six) hours as needed. 90 tablet 0    metFORMIN (GLUCOPHAGE) 1000 MG tablet Take 1 tablet (1,000 mg total) by mouth 2 (two) times daily with meals. 180 tablet 1    methotrexate 2.5 MG Tab TAKE 4 TABLETS BY MOUTH EVERY 7 DAYS. 48 tablet 0    predniSONE (DELTASONE) 10 MG tablet        No current facility-administered medications for this visit.        ROS  Review of Systems   Constitutional: Negative for chills, diaphoresis, fatigue, fever and unexpected weight change.   HENT: Negative for rhinorrhea, sinus pressure, sore throat and tinnitus.    Eyes: Negative for photophobia and visual disturbance.   Respiratory: Negative for cough, shortness of breath and wheezing.    Cardiovascular: Negative for chest pain and palpitations.   Gastrointestinal: Negative for abdominal pain, blood in stool, constipation, diarrhea, nausea and vomiting.   Genitourinary: Negative for dysuria, flank pain, frequency and vaginal discharge.   Musculoskeletal: Positive for arthralgias, back pain and joint swelling.   Skin: Negative for rash.   Neurological: Negative for speech difficulty, weakness, light-headedness and headaches.   Psychiatric/Behavioral: Negative for behavioral  "problems and dysphoric mood.       Physical Exam  Vitals:    12/12/18 1113 12/12/18 1119 12/12/18 1201   BP: (!) 145/86 (!) 142/84 138/80   Pulse: 96     Resp: 18     Temp: 97.6 °F (36.4 °C)     TempSrc: Oral     SpO2: 97%     Weight: 58.7 kg (129 lb 6.6 oz)     Height: 5' 2" (1.575 m)      Body mass index is 23.67 kg/m².  Weight: 58.7 kg (129 lb 6.6 oz)   Height: 5' 2" (157.5 cm)     Physical Exam   Constitutional: She is oriented to person, place, and time. She appears well-developed and well-nourished.   HENT:   Head: Normocephalic and atraumatic.   Eyes: EOM are normal.   Musculoskeletal:   Left greater trochanter with pain with palpation    Left mid foot with TTP also, mild edema present   Neurological: She is alert and oriented to person, place, and time.   Skin: Skin is warm and dry. No rash noted. No erythema.   Psychiatric: She has a normal mood and affect. Her behavior is normal.   Nursing note and vitals reviewed.      Health Maintenance       Date Due Completion Date    Sign Pain Contract 10/29/1963 ---    Complete Opioid Risk Tool 10/29/1963 ---    Lipid Panel 05/08/2018 5/8/2017    Hemoglobin A1c 03/12/2019 9/12/2018    Foot Exam 06/19/2019 6/19/2018 (Done)    Override on 6/19/2018: Done    Override on 5/12/2017: Done    Mammogram 06/26/2019 6/26/2018    Eye Exam 08/06/2019 8/6/2018    Urine Microalbumin 09/12/2019 9/12/2018    DEXA SCAN 05/05/2020 5/5/2017    Colonoscopy 02/09/2025 2/9/2015 (Done)    Override on 2/9/2015: Done    Override on 2/18/2005: Done (reportedly normal)    TETANUS VACCINE 05/16/2026 5/16/2016            ASSESSMENT     1. Degenerative disc disease, lumbar    2. Essential hypertension    3. Impaired mobility    4. Lumbar disc herniation with radiculopathy    5. Chronic left hip pain    6. Left foot pain        PLAN:     Problem List Items Addressed This Visit        Neuro    Degenerative disc disease, lumbar - Primary  -stable on current pain medication regimen    Relevant " Medications    HYDROcodone-acetaminophen (NORCO) 5-325 mg per tablet    HYDROcodone-acetaminophen (NORCO) 5-325 mg per tablet (Start on 2/12/2019)    HYDROcodone-acetaminophen (NORCO) 5-325 mg per tablet (Start on 1/12/2019)    Lumbar disc herniation with radiculopathy  -continue follow up with pain management       Cardiac/Vascular    Essential hypertension (Chronic)  -will enroll in digital medicine program    Relevant Medications    carvedilol (COREG) 12.5 MG tablet    Other Relevant Orders    Hypertension Digital Medicine (HDMP) Enrollment Order (Completed)       Other    Impaired mobility  -continue ambulating with assistive device      Other Visit Diagnoses     Chronic left hip pain      -consult ortho, may have trochanteric bursitis, has seen neurosurgery and ortho eval was recommended also    Relevant Orders    Ambulatory referral to Orthopedics    Left foot pain      -possible sprain for poor ambulation            Micaela Mendoza MD  12/12/2018 2:40 PM        Follow-up in about 3 months (around 3/12/2019) for Follow up.

## 2018-12-14 ENCOUNTER — INFUSION (OUTPATIENT)
Dept: INFUSION THERAPY | Facility: HOSPITAL | Age: 73
End: 2018-12-14
Attending: INTERNAL MEDICINE
Payer: MEDICARE

## 2018-12-14 DIAGNOSIS — D50.9 IRON DEFICIENCY ANEMIA, UNSPECIFIED IRON DEFICIENCY ANEMIA TYPE: ICD-10-CM

## 2018-12-14 DIAGNOSIS — N18.9 ANEMIA IN CHRONIC KIDNEY DISEASE, UNSPECIFIED CKD STAGE: ICD-10-CM

## 2018-12-14 DIAGNOSIS — D63.1 ANEMIA IN CHRONIC KIDNEY DISEASE, UNSPECIFIED CKD STAGE: ICD-10-CM

## 2018-12-14 DIAGNOSIS — D64.9 ANEMIA: Primary | ICD-10-CM

## 2018-12-14 DIAGNOSIS — Z53.1 REFUSAL OF BLOOD TRANSFUSIONS AS PATIENT IS JEHOVAH'S WITNESS: ICD-10-CM

## 2018-12-14 PROCEDURE — 96372 THER/PROPH/DIAG INJ SC/IM: CPT | Mod: HCNC,59

## 2018-12-14 PROCEDURE — 96374 THER/PROPH/DIAG INJ IV PUSH: CPT | Mod: HCNC

## 2018-12-14 PROCEDURE — 63600175 PHARM REV CODE 636 W HCPCS: Mod: JG,HCNC | Performed by: INTERNAL MEDICINE

## 2018-12-14 PROCEDURE — 25000003 PHARM REV CODE 250: Mod: HCNC | Performed by: INTERNAL MEDICINE

## 2018-12-14 RX ORDER — FERUMOXYTOL 510 MG/17ML
510 INJECTION INTRAVENOUS
Status: DISCONTINUED | OUTPATIENT
Start: 2018-12-14 | End: 2018-12-14

## 2018-12-14 RX ADMIN — ERYTHROPOIETIN 20000 UNITS: 20000 INJECTION, SOLUTION INTRAVENOUS; SUBCUTANEOUS at 11:12

## 2018-12-14 RX ADMIN — FERUMOXYTOL 510 MG: 510 INJECTION INTRAVENOUS at 10:12

## 2018-12-14 NOTE — NURSING
Patient reports increased fatigue and FAUSTIN. Labs reviewed. Patient received Feraheme and Procrit 20,000 units. Tolerated well. VSS. Received discharge instructions and follow up appointments. Verbalized understanding and ambulated with walker off unit accompanied by daughter.

## 2018-12-17 DIAGNOSIS — M25.559 ARTHRALGIA OF HIP, UNSPECIFIED LATERALITY: Primary | ICD-10-CM

## 2018-12-21 ENCOUNTER — INFUSION (OUTPATIENT)
Dept: INFUSION THERAPY | Facility: HOSPITAL | Age: 73
End: 2018-12-21
Attending: INTERNAL MEDICINE
Payer: MEDICARE

## 2018-12-21 VITALS
HEART RATE: 84 BPM | DIASTOLIC BLOOD PRESSURE: 77 MMHG | TEMPERATURE: 98 F | RESPIRATION RATE: 18 BRPM | SYSTOLIC BLOOD PRESSURE: 163 MMHG

## 2018-12-21 DIAGNOSIS — D64.9 ANEMIA: Primary | ICD-10-CM

## 2018-12-21 PROCEDURE — 63600175 PHARM REV CODE 636 W HCPCS: Mod: JG,HCNC | Performed by: INTERNAL MEDICINE

## 2018-12-21 PROCEDURE — 96374 THER/PROPH/DIAG INJ IV PUSH: CPT | Mod: HCNC

## 2018-12-21 RX ORDER — FERUMOXYTOL 510 MG/17ML
510 INJECTION INTRAVENOUS
Status: COMPLETED | OUTPATIENT
Start: 2018-12-21 | End: 2018-12-21

## 2018-12-21 RX ADMIN — FERUMOXYTOL 510 MG: 510 INJECTION INTRAVENOUS at 11:12

## 2018-12-21 NOTE — PLAN OF CARE
Problem: Adult Inpatient Plan of Care  Goal: Plan of Care Review  Outcome: Ongoing (interventions implemented as appropriate)  Patient received Feraheme. Tolerated well. VSS. Received discharge instructions and follow up appointments. Verbalized understanding and ambulated with walker off unit accompanied by daughter.

## 2018-12-26 ENCOUNTER — OFFICE VISIT (OUTPATIENT)
Dept: NEPHROLOGY | Facility: CLINIC | Age: 73
End: 2018-12-26
Payer: MEDICARE

## 2018-12-26 VITALS
SYSTOLIC BLOOD PRESSURE: 162 MMHG | OXYGEN SATURATION: 98 % | HEIGHT: 62 IN | HEART RATE: 118 BPM | WEIGHT: 129 LBS | DIASTOLIC BLOOD PRESSURE: 80 MMHG | BODY MASS INDEX: 23.74 KG/M2

## 2018-12-26 DIAGNOSIS — I10 ESSENTIAL HYPERTENSION: Chronic | ICD-10-CM

## 2018-12-26 DIAGNOSIS — N18.30 CONTROLLED TYPE 2 DIABETES MELLITUS WITH STAGE 3 CHRONIC KIDNEY DISEASE, WITHOUT LONG-TERM CURRENT USE OF INSULIN: Primary | ICD-10-CM

## 2018-12-26 DIAGNOSIS — E11.22 CONTROLLED TYPE 2 DIABETES MELLITUS WITH STAGE 3 CHRONIC KIDNEY DISEASE, WITHOUT LONG-TERM CURRENT USE OF INSULIN: Primary | ICD-10-CM

## 2018-12-26 PROCEDURE — 3077F SYST BP >= 140 MM HG: CPT | Mod: CPTII,HCNC,S$GLB, | Performed by: INTERNAL MEDICINE

## 2018-12-26 PROCEDURE — 3044F HG A1C LEVEL LT 7.0%: CPT | Mod: CPTII,HCNC,S$GLB, | Performed by: INTERNAL MEDICINE

## 2018-12-26 PROCEDURE — 1101F PT FALLS ASSESS-DOCD LE1/YR: CPT | Mod: CPTII,HCNC,S$GLB, | Performed by: INTERNAL MEDICINE

## 2018-12-26 PROCEDURE — 99999 PR PBB SHADOW E&M-EST. PATIENT-LVL II: CPT | Mod: PBBFAC,HCNC,, | Performed by: INTERNAL MEDICINE

## 2018-12-26 PROCEDURE — 99213 OFFICE O/P EST LOW 20 MIN: CPT | Mod: HCNC,S$GLB,, | Performed by: INTERNAL MEDICINE

## 2018-12-26 PROCEDURE — 3079F DIAST BP 80-89 MM HG: CPT | Mod: CPTII,HCNC,S$GLB, | Performed by: INTERNAL MEDICINE

## 2018-12-26 RX ORDER — CARVEDILOL 25 MG/1
25 TABLET ORAL 2 TIMES DAILY
Qty: 180 TABLET | Refills: 3 | Status: SHIPPED | OUTPATIENT
Start: 2018-12-26 | End: 2019-12-16 | Stop reason: SDUPTHER

## 2018-12-26 NOTE — PROGRESS NOTES
Subjective:       Patient ID: Regino Lawrence is a 73 y.o. Black or  female who presents for follow up of Chronic Kidney Disease    HPI    Ms. Lawrence is a 73 year old woman with medical history of diabetes, hypertension presenting for follow up of chronic kidney disease.  Patient reports blood sugars and blood pressure well-controlled at home.  She denies any NSAID use.  She otherwise denies any fever, chest pain, shortness of breath, abdominal pain, diarrhea, dysuria/hematuria.     Review of Systems   Constitutional: Negative for appetite change, fatigue and fever.   Respiratory: Negative for chest tightness and shortness of breath.    Cardiovascular: Negative for chest pain and leg swelling.   Gastrointestinal: Negative for abdominal pain, constipation, diarrhea, nausea and vomiting.   Genitourinary: Negative for difficulty urinating, dysuria, flank pain, frequency, hematuria and urgency.   Musculoskeletal: Negative for arthralgias, joint swelling and myalgias.   Skin: Negative for rash and wound.   Neurological: Negative for dizziness, weakness and light-headedness.   All other systems reviewed and are negative.      Objective:      Physical Exam   Constitutional: She appears well-developed and well-nourished.   Cardiovascular: Normal rate, regular rhythm and normal heart sounds. Exam reveals no gallop and no friction rub.   No murmur heard.  Pulmonary/Chest: Effort normal and breath sounds normal. No respiratory distress. She has no wheezes. She has no rales.   Abdominal: Soft. Bowel sounds are normal. There is no tenderness.   Musculoskeletal: She exhibits no edema.   Neurological: She is alert.   Skin: Skin is warm and dry. No rash noted. No erythema.   Psychiatric: She has a normal mood and affect.   Vitals reviewed.      Assessment:       1. Controlled type 2 diabetes mellitus with stage 3 chronic kidney disease, without long-term current use of insulin    2. Essential hypertension         Plan:      Ms. Lawrence is a 73 year old woman with medical history of diabetes, hypertension presenting for follow up of chronic kidney disease.  Patient with early CKD stage III, suspect due to age-related renal nephron loss, along with possible diabetic nephropathy v. hypertensive nephrosclerosis.  Patient creatinine improved and stable after prior elevation (likely with obstructive uropathy, currently resolved, followed by Urology/UroGyn), will continue to trend.  Stressed importance of blood pressure/glycemic control to prevent any further progression of kidney disease, patient voiced understanding.    - Hypertension: blood pressure above goal, will increase carvedilol to 25mg twice daily, will phone review BP diary    Return to clinic in 12 months with renal/heme panel, iron/TIBC/ferritin, urinalysis/culture, urine protein/creatinine ratio prior to next visit

## 2018-12-31 ENCOUNTER — PATIENT MESSAGE (OUTPATIENT)
Dept: FAMILY MEDICINE | Facility: CLINIC | Age: 73
End: 2018-12-31

## 2018-12-31 ENCOUNTER — LAB VISIT (OUTPATIENT)
Dept: LAB | Facility: HOSPITAL | Age: 73
End: 2018-12-31
Attending: FAMILY MEDICINE
Payer: MEDICARE

## 2018-12-31 DIAGNOSIS — E03.9 HYPOTHYROIDISM, UNSPECIFIED TYPE: ICD-10-CM

## 2018-12-31 DIAGNOSIS — E03.9 HYPOTHYROIDISM, UNSPECIFIED TYPE: Primary | ICD-10-CM

## 2018-12-31 LAB
T4 FREE SERPL-MCNC: 1.21 NG/DL
TSH SERPL DL<=0.005 MIU/L-ACNC: 0.31 UIU/ML

## 2018-12-31 PROCEDURE — 84439 ASSAY OF FREE THYROXINE: CPT | Mod: HCNC

## 2018-12-31 PROCEDURE — 84443 ASSAY THYROID STIM HORMONE: CPT | Mod: HCNC

## 2018-12-31 PROCEDURE — 36415 COLL VENOUS BLD VENIPUNCTURE: CPT | Mod: HCNC,PO

## 2018-12-31 NOTE — TELEPHONE ENCOUNTER
Patient informed that a lab appointment is required for a refill of levothyroxine so the correct dose can be ordered.  Verbalized understanding and scheduled appointment for today.

## 2019-01-01 DIAGNOSIS — E03.9 HYPOTHYROIDISM, UNSPECIFIED TYPE: ICD-10-CM

## 2019-01-02 ENCOUNTER — PATIENT MESSAGE (OUTPATIENT)
Dept: FAMILY MEDICINE | Facility: CLINIC | Age: 74
End: 2019-01-02

## 2019-01-02 ENCOUNTER — TELEPHONE (OUTPATIENT)
Dept: NEPHROLOGY | Facility: CLINIC | Age: 74
End: 2019-01-02

## 2019-01-02 RX ORDER — LEVOTHYROXINE SODIUM 50 UG/1
TABLET ORAL
Qty: 90 TABLET | Refills: 1 | Status: SHIPPED | OUTPATIENT
Start: 2019-01-02 | End: 2019-07-05 | Stop reason: SDUPTHER

## 2019-01-02 NOTE — TELEPHONE ENCOUNTER
----- Message from Benjie Lau MD sent at 12/31/2018 11:14 AM CST -----  Please call to see if BP improved with increase in Carvedilol, thank you.

## 2019-01-03 ENCOUNTER — OFFICE VISIT (OUTPATIENT)
Dept: ORTHOPEDICS | Facility: CLINIC | Age: 74
End: 2019-01-03
Attending: ORTHOPAEDIC SURGERY
Payer: MEDICARE

## 2019-01-03 VITALS
BODY MASS INDEX: 24.26 KG/M2 | HEART RATE: 108 BPM | DIASTOLIC BLOOD PRESSURE: 100 MMHG | WEIGHT: 131.81 LBS | SYSTOLIC BLOOD PRESSURE: 140 MMHG | HEIGHT: 62 IN

## 2019-01-03 DIAGNOSIS — M51.16 LUMBAR DISC HERNIATION WITH RADICULOPATHY: Primary | ICD-10-CM

## 2019-01-03 DIAGNOSIS — M70.62 GREATER TROCHANTERIC BURSITIS OF LEFT HIP: ICD-10-CM

## 2019-01-03 PROCEDURE — 99999 PR PBB SHADOW E&M-EST. PATIENT-LVL III: ICD-10-PCS | Mod: PBBFAC,HCNC,, | Performed by: ORTHOPAEDIC SURGERY

## 2019-01-03 PROCEDURE — 1101F PT FALLS ASSESS-DOCD LE1/YR: CPT | Mod: CPTII,HCNC,S$GLB, | Performed by: ORTHOPAEDIC SURGERY

## 2019-01-03 PROCEDURE — 99999 PR PBB SHADOW E&M-EST. PATIENT-LVL III: CPT | Mod: PBBFAC,HCNC,, | Performed by: ORTHOPAEDIC SURGERY

## 2019-01-03 PROCEDURE — 20610 LARGE JOINT ASPIRATION/INJECTION: L GREATER TROCHANTERIC BURSA: ICD-10-PCS | Mod: HCNC,LT,S$GLB, | Performed by: ORTHOPAEDIC SURGERY

## 2019-01-03 PROCEDURE — 3077F PR MOST RECENT SYSTOLIC BLOOD PRESSURE >= 140 MM HG: ICD-10-PCS | Mod: CPTII,HCNC,S$GLB, | Performed by: ORTHOPAEDIC SURGERY

## 2019-01-03 PROCEDURE — 1101F PR PT FALLS ASSESS DOC 0-1 FALLS W/OUT INJ PAST YR: ICD-10-PCS | Mod: CPTII,HCNC,S$GLB, | Performed by: ORTHOPAEDIC SURGERY

## 2019-01-03 PROCEDURE — 3080F DIAST BP >= 90 MM HG: CPT | Mod: CPTII,HCNC,S$GLB, | Performed by: ORTHOPAEDIC SURGERY

## 2019-01-03 PROCEDURE — 99204 PR OFFICE/OUTPT VISIT, NEW, LEVL IV, 45-59 MIN: ICD-10-PCS | Mod: 25,HCNC,S$GLB, | Performed by: ORTHOPAEDIC SURGERY

## 2019-01-03 PROCEDURE — 3077F SYST BP >= 140 MM HG: CPT | Mod: CPTII,HCNC,S$GLB, | Performed by: ORTHOPAEDIC SURGERY

## 2019-01-03 PROCEDURE — 99204 OFFICE O/P NEW MOD 45 MIN: CPT | Mod: 25,HCNC,S$GLB, | Performed by: ORTHOPAEDIC SURGERY

## 2019-01-03 PROCEDURE — 3080F PR MOST RECENT DIASTOLIC BLOOD PRESSURE >= 90 MM HG: ICD-10-PCS | Mod: CPTII,HCNC,S$GLB, | Performed by: ORTHOPAEDIC SURGERY

## 2019-01-03 PROCEDURE — 20610 DRAIN/INJ JOINT/BURSA W/O US: CPT | Mod: HCNC,LT,S$GLB, | Performed by: ORTHOPAEDIC SURGERY

## 2019-01-03 RX ADMIN — TRIAMCINOLONE ACETONIDE 40 MG: 40 INJECTION, SUSPENSION INTRA-ARTICULAR; INTRAMUSCULAR at 03:01

## 2019-01-03 NOTE — PROGRESS NOTES
Chief Complaint   Patient presents with    Left Hip - Pain, Swelling       HPI: Regino Lawrence is a 73 y.o. female who presents today complaining of left neck, hip and leg pain   Trauma or new activity: no  Pain is constant   5/10 at best and 9/10 at its worst  Aggravating factors: walking, lying on the left side  Relieving factors: rest  The pain radiates from her buttock down the leg to her foot.  She denies groin pain. She does have lateral hip pain  Associated symptoms:  none.    Prior treatment:  RHIANNA with improvement in back pain but not hip pain   Pain does interfere with sleep and activities of daily living .    This is the extent of the patient's complaints at this time.     Review of Systems   Constitutional: Negative.    HENT: Negative.    Eyes: Negative.    Respiratory: Negative.    Cardiovascular: Negative.    Gastrointestinal: Negative.    Genitourinary: Negative.    Musculoskeletal: Positive for joint pain.   Skin: Negative.    Neurological: Negative.    Endo/Heme/Allergies: Negative.    Psychiatric/Behavioral: Negative.    All other systems reviewed and are negative.        Review of patient's allergies indicates:  No Known Allergies      Current Outpatient Medications:     ACCU-CHEK FASTCLIX LANCING DEV Kit, USE AS DIRECTED., Disp: 1 each, Rfl: 0    ACCU-CHEK SMARTVIEW TEST STRIP Strp, TEST  ONCE  A  DAY, Disp: 100 strip, Rfl: 11    ALCOHOL ANTISEPTIC PADS (ALCOHOL PREP PADS TOP), , Disp: , Rfl:     amLODIPine (NORVASC) 10 MG tablet, TAKE ONE TABLET BY MOUTH ONCE DAILY, Disp: 90 tablet, Rfl: 0    blood-glucose meter kit, Use as instructed, Disp: 1 each, Rfl: 0    calcium carbonate (OS-MALCOLM) 600 mg calcium (1,500 mg) Tab, Take 1 tablet by mouth 2 (two) times daily. , Disp: , Rfl:     carvedilol (COREG) 25 MG tablet, Take 1 tablet (25 mg total) by mouth 2 (two) times daily., Disp: 180 tablet, Rfl: 3    cyclobenzaprine (FLEXERIL) 10 MG tablet, Take 1 tablet (10 mg total) by mouth 3 (three)  times daily as needed for Muscle spasms., Disp: 270 tablet, Rfl: 0    epoetin german (PROCRIT INJ), Inject 1,000 Units as directed., Disp: , Rfl:     fenofibrate 160 MG Tab, Take 1 tablet (160 mg total) by mouth once daily., Disp: 90 tablet, Rfl: 1    fexofenadine (ALLEGRA ALLERGY) 180 MG tablet, Take 180 mg by mouth daily as needed. , Disp: , Rfl:     gabapentin (NEURONTIN) 400 MG capsule, Take 1 capsule (400 mg total) by mouth 2 (two) times daily., Disp: 180 capsule, Rfl: 1    HYDROcodone-acetaminophen (NORCO) 5-325 mg per tablet, Take 1 tablet by mouth every 6 (six) hours as needed., Disp: 90 tablet, Rfl: 0    [START ON 2/12/2019] HYDROcodone-acetaminophen (NORCO) 5-325 mg per tablet, Take 1 tablet by mouth every 6 (six) hours as needed., Disp: 90 tablet, Rfl: 0    [START ON 1/12/2019] HYDROcodone-acetaminophen (NORCO) 5-325 mg per tablet, Take 1 tablet by mouth every 6 (six) hours as needed., Disp: 90 tablet, Rfl: 0    levothyroxine (SYNTHROID) 50 MCG tablet, TAKE 1 TABLET BY MOUTH ONCE DAILY BEFORE BREAKFAST, Disp: 90 tablet, Rfl: 1    metFORMIN (GLUCOPHAGE) 1000 MG tablet, Take 1 tablet (1,000 mg total) by mouth 2 (two) times daily with meals., Disp: 180 tablet, Rfl: 1    methotrexate 2.5 MG Tab, TAKE 4 TABLETS BY MOUTH EVERY 7 DAYS., Disp: 48 tablet, Rfl: 0    predniSONE (DELTASONE) 10 MG tablet, , Disp: , Rfl:     cloNIDine (CATAPRES) 0.3 MG tablet, Take 1 tablet (0.3 mg total) by mouth 3 (three) times daily., Disp: 270 tablet, Rfl: 1    folic acid (FOLVITE) 1 MG tablet, Take 1 tablet (1 mg total) by mouth once daily., Disp: 90 tablet, Rfl: 0    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, without long-term current use of insulin      Depression     Diabetes mellitus, type 2     Eye injury as a child     k-abrasion  od    Hyperlipidemia     Hypertension     Hypothyroidism     Immune deficiency disorder     Immune disorder     Myalgia and myositis 9/6/2012    Osteoporosis     Polyneuropathy     Renal manifestation of secondary diabetes mellitus     Sarcoidosis     Ulcer     no cancer    Urinary incontinence        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, without long-term current use of insulin    Sarcoidosis    Corneal scar, right eye    Nuclear sclerosis    Senile nuclear sclerosis    Immunosuppression    Vitamin B12 deficiency anemia    Left shoulder pain    Cervical spinal stenosis    Patient is Buddhist    GERD (gastroesophageal reflux disease)    Debility    S/P cervical spinal fusion    Chronic bilateral low back pain with left-sided sciatica    Degenerative disc disease, lumbar    Poor motor control of trunk    Impaired mobility    Muscle weakness    Iron deficiency anemia    Anemia in CKD (chronic kidney disease)    Refusal of blood transfusions as patient is Buddhist    Current use of steroid medication    DM type 2 without retinopathy    Pseudophakia    Insufficiency of tear film of both eyes    Refractive error    Myopathy    Osteopenia    Calcification of aorta    Dry eye syndrome, bilateral    Neck pain    Lumbar disc herniation with radiculopathy    Acute left-sided low back pain without sciatica    Antalgic gait    Lumbar radiculopathy     Lumbar stenosis with neurogenic claudication    Chronic bilateral low back pain without sciatica    Anemia in chronic kidney disease       Past Surgical History:   Procedure Laterality Date    CARPAL TUNNEL RELEASE      Rt wrist    CATARACT EXTRACTION W/  INTRAOCULAR LENS IMPLANT Right 2015    Dr. Azevedo    CATARACT EXTRACTION W/  INTRAOCULAR LENS IMPLANT Left 2015    Dr. Azevedo     SECTION      CHOLECYSTECTOMY      COLPOCLEISIS-- [place] N/A 2016    Per[facility]ith Becker [facility] at [facility] [place]    CYSTOSCOPY N/A 2016    Performed by Meredith Becker [facility] at [facility]    Injection,steroid,epidural,transforaminal approach    Bilateral L5 Bilateral 2018    Performed by Alfonso Richards MD at [facility]    Injection,steroid,epidural,transforaminal approach  Left L5-S1 Left 10/8/2018    Performed by [facility]so Richards [facility] at [facility]    INSERTION-INTRAOCULAR LENS (IOL) Left 2015    [facility] at [facility] OR    INSERTION-INTRAOCULAR LENS (IOL) Right 2015    [facility] at [facility]    LAMINECTOMY-CERVICAL/FUSION-POSTERIOR C3-C7 N/A 2015    [facility] at [facility] 2ND [facility]    PHACOEMULSIFICATION-ASPIRATION-CATARACT Left 2015    [facility] at [facility] OR    PHACOEMULSIFICATION-ASPIRATION-CATARACT Right 2015    [facility] by [facility] at [facility]    REPAIR-POSTERIOR N/A 2016    Performed by [facility] at [facility]    SLING-TRANSOBTERATOR TAPE N/A 2016    Performed by [facility]eredith Becker [facility] at [facility]    TUBAL LIGATION         Social History     Tobacco Use    Smoking status: Never Smoker    Smokeless tobacco: Never Used   Substance Use Topics    Alcohol use: No    Drug use: No       Family History   Problem Relation Age of Onset    Hypertension Mother     Cataracts Mother     No Known Problems  Father     Hypertension Maternal Grandmother     Glaucoma Sister     Arthritis Sister     No Known Problems Brother     No Known Problems Maternal Aunt     No Known Problems Maternal Uncle     No Known Problems Paternal Aunt     No Known Problems Paternal Uncle     No Known Problems Maternal Grandfather     No Known Problems Paternal Grandmother     No Known Problems Paternal Grandfather     Kidney failure Sister     Hepatitis Sister     Cancer Sister         bone cancer     Immunodeficiency Sister     Lupus Neg Hx     Rheum arthritis Neg Hx     Amblyopia Neg Hx     Blindness Neg Hx     Diabetes Neg Hx     Macular degeneration Neg Hx     Retinal detachment Neg Hx     Strabismus Neg Hx     Stroke Neg Hx     Thyroid disease Neg Hx     Endometrial cancer Neg Hx     Vaginal cancer Neg Hx     Cervical cancer Neg Hx        Physical Exam:     Vitals:    19 1532   BP: (!) 140/100   Pulse: 108           General: Weight: 59.8 kg (131 lb 13.4 oz) Body mass index is 24.11 kg/m².  Patient is alert, awake and oriented to time, place and person. Mood and affect are appropriate.  Patient does not appear to be in any distress, denies any constitutional symptoms and appears stated age.   HEENT: Pupils are equal and round, sclera are not injected. External examination of ears and nose reveals no abnormalities. Cranial nerves II-X are grossly intact  Skin: no rashes, abrasions or open wounds on the affected extremity   Resp: No respiratory distress or audible wheezing   CV: 2+  pulses, all extremities warm and well perfused   Left Hip  FROM hip without pain  - ER 35, IR 30  Negative stinchfield   + TTP over greater trochanter bursa  No calf swelling or tenderness  NVI    Imagin views left hip: negative for significant degenerative changes    MRI: disc herniation with impingement on left L5 nerve root      Assessment: 73 y.o. female with left greater trochanteric bursitis, left leg pain    I  explained my diagnostic impression and the reasoning behind it in detail, using layman's terms.  Models and/or pictures were used to help in the explanation.    Plan:   - Injection L trochanteric bursitis, please see procedure note   - Her pain seems to be multifactoral -- she does have some pain localized over the greater trochanter but this would not explain radiation of the pain down the leg.    - Return to clinic PRN    All questions were answered in detail. The patient is in full agreement with the treatment plan and will proceed accordingly.

## 2019-01-06 PROBLEM — M70.62 GREATER TROCHANTERIC BURSITIS OF LEFT HIP: Status: ACTIVE | Noted: 2019-01-06

## 2019-01-06 RX ORDER — TRIAMCINOLONE ACETONIDE 40 MG/ML
40 INJECTION, SUSPENSION INTRA-ARTICULAR; INTRAMUSCULAR
Status: DISCONTINUED | OUTPATIENT
Start: 2019-01-03 | End: 2019-01-06 | Stop reason: HOSPADM

## 2019-01-06 NOTE — PROCEDURES
Large Joint Aspiration/Injection: L greater trochanteric bursa  Date/Time: 1/3/2019 3:00 PM  Performed by: Ya Silver MD  Authorized by: Ya Silver MD     Consent Done?:  Yes (Verbal)  Indications:  Pain  Timeout: Prior to procedure the correct patient, procedure, and site was verified      Location:  Hip  Site:  L greater trochanteric bursa  Prep: Patient was prepped and draped in usual sterile fashion    Ultrasonic Guidance for needle placement: No  Needle size:  22 G  Approach:  Lateral  Medications:  40 mg triamcinolone acetonide 40 mg/mL  Patient tolerance:  Patient tolerated the procedure well with no immediate complications

## 2019-01-10 ENCOUNTER — PATIENT MESSAGE (OUTPATIENT)
Dept: ORTHOPEDICS | Facility: CLINIC | Age: 74
End: 2019-01-10

## 2019-01-11 ENCOUNTER — LAB VISIT (OUTPATIENT)
Dept: LAB | Facility: HOSPITAL | Age: 74
End: 2019-01-11
Attending: INTERNAL MEDICINE
Payer: MEDICARE

## 2019-01-11 DIAGNOSIS — D63.1 ANEMIA IN CHRONIC KIDNEY DISEASE, UNSPECIFIED CKD STAGE: ICD-10-CM

## 2019-01-11 DIAGNOSIS — N18.9 ANEMIA IN CHRONIC KIDNEY DISEASE, UNSPECIFIED CKD STAGE: ICD-10-CM

## 2019-01-11 LAB
BASOPHILS # BLD AUTO: 0 K/UL
BASOPHILS NFR BLD: 0 %
DIFFERENTIAL METHOD: ABNORMAL
EOSINOPHIL # BLD AUTO: 0 K/UL
EOSINOPHIL NFR BLD: 0.2 %
ERYTHROCYTE [DISTWIDTH] IN BLOOD BY AUTOMATED COUNT: 14.7 %
HCT VFR BLD AUTO: 32.8 %
HGB BLD-MCNC: 11.2 G/DL
LYMPHOCYTES # BLD AUTO: 1.5 K/UL
LYMPHOCYTES NFR BLD: 16.9 %
MCH RBC QN AUTO: 34 PG
MCHC RBC AUTO-ENTMCNC: 34.1 G/DL
MCV RBC AUTO: 100 FL
MONOCYTES # BLD AUTO: 0.5 K/UL
MONOCYTES NFR BLD: 6 %
NEUTROPHILS # BLD AUTO: 6.9 K/UL
NEUTROPHILS NFR BLD: 76.9 %
PLATELET # BLD AUTO: 289 K/UL
PMV BLD AUTO: 9 FL
RBC # BLD AUTO: 3.29 M/UL
WBC # BLD AUTO: 8.9 K/UL

## 2019-01-11 PROCEDURE — 36415 COLL VENOUS BLD VENIPUNCTURE: CPT | Mod: HCNC

## 2019-01-11 PROCEDURE — 85025 COMPLETE CBC W/AUTO DIFF WBC: CPT | Mod: HCNC

## 2019-01-18 ENCOUNTER — PATIENT MESSAGE (OUTPATIENT)
Dept: ORTHOPEDICS | Facility: CLINIC | Age: 74
End: 2019-01-18

## 2019-01-21 ENCOUNTER — OFFICE VISIT (OUTPATIENT)
Dept: ORTHOPEDICS | Facility: CLINIC | Age: 74
End: 2019-01-21
Payer: MEDICARE

## 2019-01-21 VITALS
WEIGHT: 133.38 LBS | BODY MASS INDEX: 24.54 KG/M2 | SYSTOLIC BLOOD PRESSURE: 130 MMHG | DIASTOLIC BLOOD PRESSURE: 70 MMHG | HEART RATE: 74 BPM | HEIGHT: 62 IN

## 2019-01-21 DIAGNOSIS — M51.16 LUMBAR DISC HERNIATION WITH RADICULOPATHY: ICD-10-CM

## 2019-01-21 DIAGNOSIS — M54.16 LUMBAR RADICULOPATHY: Primary | ICD-10-CM

## 2019-01-21 PROCEDURE — 1101F PR PT FALLS ASSESS DOC 0-1 FALLS W/OUT INJ PAST YR: ICD-10-PCS | Mod: CPTII,HCNC,S$GLB, | Performed by: ORTHOPAEDIC SURGERY

## 2019-01-21 PROCEDURE — 99999 PR PBB SHADOW E&M-EST. PATIENT-LVL IV: ICD-10-PCS | Mod: PBBFAC,HCNC,, | Performed by: ORTHOPAEDIC SURGERY

## 2019-01-21 PROCEDURE — 3075F SYST BP GE 130 - 139MM HG: CPT | Mod: CPTII,HCNC,S$GLB, | Performed by: ORTHOPAEDIC SURGERY

## 2019-01-21 PROCEDURE — 99213 PR OFFICE/OUTPT VISIT, EST, LEVL III, 20-29 MIN: ICD-10-PCS | Mod: HCNC,S$GLB,, | Performed by: ORTHOPAEDIC SURGERY

## 2019-01-21 PROCEDURE — 3075F PR MOST RECENT SYSTOLIC BLOOD PRESS GE 130-139MM HG: ICD-10-PCS | Mod: CPTII,HCNC,S$GLB, | Performed by: ORTHOPAEDIC SURGERY

## 2019-01-21 PROCEDURE — 3078F DIAST BP <80 MM HG: CPT | Mod: CPTII,HCNC,S$GLB, | Performed by: ORTHOPAEDIC SURGERY

## 2019-01-21 PROCEDURE — 3078F PR MOST RECENT DIASTOLIC BLOOD PRESSURE < 80 MM HG: ICD-10-PCS | Mod: CPTII,HCNC,S$GLB, | Performed by: ORTHOPAEDIC SURGERY

## 2019-01-21 PROCEDURE — 1101F PT FALLS ASSESS-DOCD LE1/YR: CPT | Mod: CPTII,HCNC,S$GLB, | Performed by: ORTHOPAEDIC SURGERY

## 2019-01-21 PROCEDURE — 99213 OFFICE O/P EST LOW 20 MIN: CPT | Mod: HCNC,S$GLB,, | Performed by: ORTHOPAEDIC SURGERY

## 2019-01-21 PROCEDURE — 99999 PR PBB SHADOW E&M-EST. PATIENT-LVL IV: CPT | Mod: PBBFAC,HCNC,, | Performed by: ORTHOPAEDIC SURGERY

## 2019-01-21 NOTE — PROGRESS NOTES
Follow up visit    History of Present Illness:   Regino comes to the office for follow up evaluation of let hip pain. She did have some relief after her injection into the L greater trochanter but continues to complain of pain radiating down the left leg from the hip to her foot. She has had 2 ESIs -- the last was in December and she reports relief of back pain but continues to have radicular symptoms.     ROS: unremarkable and no change since last visit    Physical Examination:    NAD  L hip: + tender to palpation over lateral proximal femur  Localizes pain in a L5 radicular pattern  NV status: Unchanged    Radiographic imaging: no new imaging     Assessment/Plan:  1. Left L5 radiculopathy       We discussed the etiology of persistent pain and further treatment options.  1. Patient would like to see physicians on the [facility] when possible -- will have her see Dr Osei to see if an additional RHIANNA would be appropriate. If not I recommend that she goes back to see Dr. Conner who has preformed c-spine surgery on her in the past with good results.       All questions were answered in detail. The patient  verbalized the understanding of the treatment plan and is in full agreement with the treatment plan.

## 2019-01-22 ENCOUNTER — OFFICE VISIT (OUTPATIENT)
Dept: PAIN MEDICINE | Facility: CLINIC | Age: 74
End: 2019-01-22
Payer: MEDICARE

## 2019-01-22 VITALS
OXYGEN SATURATION: 98 % | HEIGHT: 62 IN | RESPIRATION RATE: 18 BRPM | WEIGHT: 129.44 LBS | SYSTOLIC BLOOD PRESSURE: 184 MMHG | BODY MASS INDEX: 23.82 KG/M2 | HEART RATE: 92 BPM | DIASTOLIC BLOOD PRESSURE: 84 MMHG

## 2019-01-22 DIAGNOSIS — D86.9 SARCOIDOSIS: Chronic | ICD-10-CM

## 2019-01-22 DIAGNOSIS — R26.89 ANTALGIC GAIT: Primary | ICD-10-CM

## 2019-01-22 DIAGNOSIS — M54.16 LUMBAR RADICULOPATHY: ICD-10-CM

## 2019-01-22 DIAGNOSIS — G72.9 MYOPATHY: ICD-10-CM

## 2019-01-22 DIAGNOSIS — I10 ESSENTIAL HYPERTENSION: ICD-10-CM

## 2019-01-22 DIAGNOSIS — M47.816 LUMBAR SPONDYLOSIS: ICD-10-CM

## 2019-01-22 DIAGNOSIS — M51.16 LUMBAR DISC HERNIATION WITH RADICULOPATHY: ICD-10-CM

## 2019-01-22 PROCEDURE — 99204 PR OFFICE/OUTPT VISIT, NEW, LEVL IV, 45-59 MIN: ICD-10-PCS | Mod: HCNC,S$GLB,, | Performed by: PAIN MEDICINE

## 2019-01-22 PROCEDURE — 99204 OFFICE O/P NEW MOD 45 MIN: CPT | Mod: HCNC,S$GLB,, | Performed by: PAIN MEDICINE

## 2019-01-22 PROCEDURE — 3077F SYST BP >= 140 MM HG: CPT | Mod: CPTII,HCNC,S$GLB, | Performed by: PAIN MEDICINE

## 2019-01-22 PROCEDURE — 1101F PT FALLS ASSESS-DOCD LE1/YR: CPT | Mod: CPTII,HCNC,S$GLB, | Performed by: PAIN MEDICINE

## 2019-01-22 PROCEDURE — 99999 PR PBB SHADOW E&M-EST. PATIENT-LVL III: ICD-10-PCS | Mod: PBBFAC,HCNC,, | Performed by: PAIN MEDICINE

## 2019-01-22 PROCEDURE — 99999 PR PBB SHADOW E&M-EST. PATIENT-LVL III: CPT | Mod: PBBFAC,HCNC,, | Performed by: PAIN MEDICINE

## 2019-01-22 PROCEDURE — 3079F DIAST BP 80-89 MM HG: CPT | Mod: CPTII,HCNC,S$GLB, | Performed by: PAIN MEDICINE

## 2019-01-22 PROCEDURE — 3077F PR MOST RECENT SYSTOLIC BLOOD PRESSURE >= 140 MM HG: ICD-10-PCS | Mod: CPTII,HCNC,S$GLB, | Performed by: PAIN MEDICINE

## 2019-01-22 PROCEDURE — 3079F PR MOST RECENT DIASTOLIC BLOOD PRESSURE 80-89 MM HG: ICD-10-PCS | Mod: CPTII,HCNC,S$GLB, | Performed by: PAIN MEDICINE

## 2019-01-22 PROCEDURE — 1101F PR PT FALLS ASSESS DOC 0-1 FALLS W/OUT INJ PAST YR: ICD-10-PCS | Mod: CPTII,HCNC,S$GLB, | Performed by: PAIN MEDICINE

## 2019-01-22 RX ORDER — FOLIC ACID 1 MG/1
1 TABLET ORAL DAILY
Qty: 90 TABLET | Refills: 0 | Status: SHIPPED | OUTPATIENT
Start: 2019-01-22 | End: 2019-04-16

## 2019-01-22 RX ORDER — AMLODIPINE BESYLATE 10 MG/1
10 TABLET ORAL DAILY
Qty: 90 TABLET | Refills: 1 | Status: SHIPPED | OUTPATIENT
Start: 2019-01-22 | End: 2019-03-12

## 2019-01-22 RX ORDER — METHOTREXATE 2.5 MG/1
TABLET ORAL
Qty: 48 TABLET | Refills: 0 | Status: SHIPPED | OUTPATIENT
Start: 2019-01-22 | End: 2019-02-05 | Stop reason: SDUPTHER

## 2019-01-22 RX ORDER — CYCLOBENZAPRINE HCL 10 MG
10 TABLET ORAL 3 TIMES DAILY PRN
Qty: 270 TABLET | Refills: 0 | Status: SHIPPED | OUTPATIENT
Start: 2019-01-22 | End: 2019-01-29

## 2019-01-22 RX ORDER — AMLODIPINE BESYLATE 10 MG/1
10 TABLET ORAL DAILY
Qty: 90 TABLET | Refills: 0 | OUTPATIENT
Start: 2019-01-22

## 2019-01-22 NOTE — PROGRESS NOTES
Subjective:     Patient ID: Regino Lawrence is a 73 y.o. female    Chief Complaint: Hip Pain (left)      Referred by: No ref. provider found      HPI:    Initial Encounter (1/22/19):  Regino Lawrence is a 73 y.o. female who presents today with chronic left-sided low back and lower extremity pain. This pain has been present for a very long time. The pain is located in the left lower lumbosacral region and radiates to the left lower extremity in the L5 distribution.  She has undergone 2 epidural steroid injections to help with this problem.  She reports improvement with back pain however still continues to have left lower extremity pain numbness and weakness.  She is a patient of Dr. Conner.  Left-sided L5-S1 disc herniation has been noted on MRI.  Patient states that pain seems to only be worsening and not improving.   This pain is described in detail below.    Physical Therapy:  Yes.    Non-pharmacologic Treatment:  Rest helps         · TENS?  No    Pain Medications:         · Currently taking:  Norco, Flexeril, gabapentin    · Has tried in the past:      · Has not tried: TCAs, SNRIs, topical creams    Blood thinners:  None    Interventional Therapies:   10/8/18 - left L5 transforaminal epidural steroid injection - good relief of back pain no relief of left lower extremity symptoms  12/6/18 - bilateral L5 transforaminal epidural steroid injection - good relief of back pain no relief of left lower extremity symptoms    Relevant Surgeries:  Previous cervical fusion    Affecting sleep?  Yes    Affecting daily activities? yes    Depressive symptoms? no          · SI/HI? No    Work status: Retired    Pain Scores:    Best:       0/10  Worst:     10/10  Usually:   4/10  Today:    0/10    Review of Systems   Constitutional: Negative for activity change, appetite change, chills, fatigue, fever and unexpected weight change.   HENT: Negative for hearing loss.    Eyes: Negative for visual disturbance.   Respiratory: Negative for  chest tightness and shortness of breath.    Cardiovascular: Negative for chest pain.   Gastrointestinal: Negative for abdominal pain, constipation, diarrhea, nausea and vomiting.   Genitourinary: Negative for difficulty urinating.   Musculoskeletal: Positive for arthralgias, back pain, gait problem, myalgias, neck pain and neck stiffness.   Skin: Negative for rash.   Neurological: Positive for weakness and numbness. Negative for dizziness, light-headedness and headaches.   Psychiatric/Behavioral: Positive for sleep disturbance. Negative for hallucinations and suicidal ideas. The patient is not nervous/anxious.        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, without long-term current use of insulin     Depression     Diabetes mellitus, type 2     Eye injury as a child     k-abrasion  od    Hyperlipidemia     Hypertension     Hypothyroidism     Immune deficiency disorder     Immune disorder     Myalgia and myositis 2012    Osteoporosis     Polyneuropathy     Renal manifestation of secondary diabetes mellitus     Sarcoidosis     Ulcer     no cancer    Urinary incontinence        Past Surgical History:   Procedure Laterality Date    CARPAL TUNNEL RELEASE      Rt wrist    CATARACT EXTRACTION W/  INTRAOCULAR LENS IMPLANT Right 2015    Dr. Azevedo    CATARACT EXTRACTION W/  INTRAOCULAR LENS IMPLANT Left 2015    Dr. Azevedo     SECTION      CHOLECYSTECTOMY      COLPOCLEISIS-- [place] N/A 2016    [facility]formed [facility] at [facility] OR    CYSTOSCOPY N/A 2016    [facility] at [facility] OR    Injection,steroid,epidural,transforaminal approach    Bilateral L5 Bilateral  12/6/2018    Performed by Alfonso Richards MD at [facility] PAIN MGT    Injection,steroid,epidural,transforaminal approach  Left L5-S1 Left 10/8/2018    Performed by [facility]so Richards [facility] at [facility] PAIN MGT    INSERTION-INTRAOCULAR LENS (IOL) Left 5/21/2015    [facility]    INSERTION-INTRAOCULAR LENS (IOL) Right 4/30/2015    [facility] OR    LAMINECTOMY-CERVICAL/FUSION-POSTERIOR C3-C7 N/A 11/16/2015    [facility] OR 2ND FLR    PHACOEMULSIFICATION-ASPIRATION-CATARACT Left 5/21/2015    [facility] at [facility] OR    PHACOEMULSIFICATION-ASPIRATION-CATARACT Right 4/30/2015    [facility]    REPAIR-POSTERIOR N/A 11/28/2016    [facility] OR    SLING-TRANSOBTERATOR TAPE N/A 11/28/2016    [facility] by [facility] at [facility] OR    TUBAL LIGATION         Social History     Socioeconomic History    Marital status:      Spouse name: Not on file    Number of children: Not on file    Years of education: Not on file    Highest education level: Not on file   Social Needs    Financial resource strain: Not on file    Food insecurity - worry: Not on file    Food insecurity - inability: Not on file    Transportation needs - medical: Not on file    Transportation needs - non-medical: Not on file   Occupational History    Not on file   Tobacco Use    Smoking status: Never Smoker    Smokeless tobacco: Never Used   Substance and Sexual Activity    Alcohol use: No    Drug use: No    Sexual activity: Yes     Partners: Male   Other Topics Concern    Not on file   Social History Narrative    Not on file       Review of patient's allergies indicates:  No Known Allergies    Current Outpatient Medications on File Prior to Visit   Medication Sig Dispense Refill    ACCU-CHEK FASTCLIX LANCING DEV Kit USE AS DIRECTED. 1 each 0     ACCU-CHEK SMARTVIEW TEST STRIP Strp TEST  ONCE  A   strip 11    ALCOHOL ANTISEPTIC PADS (ALCOHOL PREP PADS TOP)       amLODIPine (NORVASC) 10 MG tablet Take 1 tablet (10 mg total) by mouth once daily. 90 tablet 1    blood-glucose meter kit Use as instructed 1 each 0    calcium carbonate (OS-MALCOLM) 600 mg calcium (1,500 mg) Tab Take 1 tablet by mouth 2 (two) times daily.       carvedilol (COREG) 25 MG tablet Take 1 tablet (25 mg total) by mouth 2 (two) times daily. 180 tablet 3    cyclobenzaprine (FLEXERIL) 10 MG tablet Take 1 tablet (10 mg total) by mouth 3 (three) times daily as needed for Muscle spasms. 270 tablet 0    epoetin german (PROCRIT INJ) Inject 1,000 Units as directed.      fenofibrate 160 MG Tab Take 1 tablet (160 mg total) by mouth once daily. 90 tablet 1    fexofenadine (ALLEGRA ALLERGY) 180 MG tablet Take 180 mg by mouth daily as needed.       gabapentin (NEURONTIN) 400 MG capsule Take 1 capsule (400 mg total) by mouth 2 (two) times daily. 180 capsule 1    HYDROcodone-acetaminophen (NORCO) 5-325 mg per tablet Take 1 tablet by mouth every 6 (six) hours as needed. 90 tablet 0    levothyroxine (SYNTHROID) 50 MCG tablet TAKE 1 TABLET BY MOUTH ONCE DAILY BEFORE BREAKFAST 90 tablet 1    metFORMIN (GLUCOPHAGE) 1000 MG tablet Take 1 tablet (1,000 mg total) by mouth 2 (two) times daily with meals. 180 tablet 1    methotrexate 2.5 MG Tab TAKE 4 TABLETS BY MOUTH EVERY 7 DAYS. 48 tablet 0    predniSONE (DELTASONE) 10 MG tablet       cloNIDine (CATAPRES) 0.3 MG tablet Take 1 tablet (0.3 mg total) by mouth 3 (three) times daily. 270 tablet 1    folic acid (FOLVITE) 1 MG tablet Take 1 tablet (1 mg total) by mouth once daily. 90 tablet 0    [START ON 2/12/2019] HYDROcodone-acetaminophen (NORCO) 5-325 mg per tablet Take 1 tablet by mouth every 6 (six) hours as needed. 90 tablet 0    [DISCONTINUED] amLODIPine (NORVASC) 10 MG tablet TAKE ONE TABLET BY MOUTH ONCE DAILY 90 tablet 0     No  "current facility-administered medications on file prior to visit.        Objective:      BP (!) 184/84 (BP Location: Right arm, Patient Position: Sitting)   Pulse 92   Resp 18   Ht 5' 2" (1.575 m)   Wt 58.7 kg (129 lb 6.6 oz)   LMP  (LMP Unknown)   SpO2 98%   BMI 23.67 kg/m²     Exam:  GEN:  Well developed, well nourished.  No acute distress.  Normal pain behavior.  HEENT:  No trauma.  Mucous membranes moist.  Nares patent bilaterally.  PSYCH: Normal affect. Thought content appropriate.  CHEST:  Breathing symmetric.  No audible wheezing.  ABD: Soft, non-distended.  SKIN:  Warm, pink, dry.  No rash on exposed areas.    EXT:  No cyanosis, clubbing, or edema.  No color change or changes in nail or hair growth.  NEURO/MUSCULOSKELETAL:  Fully alert, oriented, and appropriate. Speech normal kristen. No cranial nerve deficits.   Gait:  Antalgic.  Uses rolling walker for ambulation.  No trendelenburg sign bilaterally.   Motor Strength: 5/5 motor strength throughout lower extremities.   Sensory:  Decreased sensation left L5 dermatome; otherwise no sensory deficit in the lower extremities.   Reflexes:  2 + and symmetric throughout.  Downgoing Babinski's bilaterally.  No clonus or spasticity.  L-Spine:  Limited ROM with pain on flexion.  Unable to perform facet loading bilaterally.  Positive SLR on the left.    SI Joint/Hip:  Unable to perform SHAREE bilaterally.  Unable to perform FADIR bilaterally.  No TTP over lumbar paraspinals, bilateral SI joints, hips, piriformis muscles, or GTB.          Imaging:  Narrative     EXAMINATION:  MRI LUMBAR SPINE WITHOUT CONTRAST    CLINICAL HISTORY:  Low back pain, <6wks, no red flags, no prior management; Lumbago with sciatica, unspecified side    TECHNIQUE:  Multiplanar, multisequence MR images were acquired from the thoracolumbar junction to the sacrum without the administration of contrast.    COMPARISON:  None.    FINDINGS:  The distal cord/conus demonstrates normal size and " signal.  No evidence of osteomyelitis, marrow replacement process, or acute fracture.  No paraspinal masses.    At L1-2, there is asymmetric disc bulging to the right, resulting in mild right-sided neural foraminal narrowing.  No spinal canal stenosis.    L1-2 is unremarkable.    L2-3 demonstrates mild disc bulging slightly asymmetric to the left resulting in mild left-sided neural foraminal narrowing.  No spinal canal stenosis.    L4-5 demonstrates minimal anterolisthesis anterolisthesis.  There is mild facet arthropathy and disc bulging.  This results in mild left-sided neural foraminal narrowing.  No spinal canal stenosis.    At L5-S1, there is a moderate sized left lateral recess/foraminal disc extrusion effacing the left neural foramen, likely impinging upon the exiting left L5 nerve root.  There is left-sided degenerative disc disease, noting disc height loss and sub endplate marrow edema.  There is moderate bilateral facet arthropathy.  No spinal canal stenosis.   Impression       Moderate size left foraminal disc extrusion at L5-S1, likely impinging upon the exiting left L5 nerve root.    Additional lumbar spondylosis, resulting in mild neural foraminal narrowing at L1-2, L2-3, and L4-5, as above.  No spinal canal stenosis.      Electronically signed by: Julian Mathews MD  Date: 09/13/2018  Time: 08:09         Assessment:       Encounter Diagnoses   Name Primary?    Antalgic gait Yes    Lumbar radiculopathy     Lumbar disc herniation with radiculopathy     Lumbar spondylosis          Plan:       Regino was seen today for hip pain.    Diagnoses and all orders for this visit:    Antalgic gait    Lumbar radiculopathy    Lumbar disc herniation with radiculopathy    Lumbar spondylosis        Regino Lawrence is a 73 y.o. female with chronic left-sided low back and left lower extremity pain secondary to left L5 radiculopathy from L5-S1 disc herniation.  No meaningful relief following epidural steroid  injections..    1.  Pertinent imaging studies reviewed by me. Imaging results were discussed with patient.  2.  Will refer back to Dr. Conner to be evaluated for surgery.  3.  Return to clinic as needed.

## 2019-01-25 ENCOUNTER — INFUSION (OUTPATIENT)
Dept: INFUSION THERAPY | Facility: HOSPITAL | Age: 74
End: 2019-01-25
Attending: INTERNAL MEDICINE
Payer: MEDICARE

## 2019-01-25 VITALS
HEART RATE: 80 BPM | RESPIRATION RATE: 19 BRPM | DIASTOLIC BLOOD PRESSURE: 76 MMHG | BODY MASS INDEX: 23.67 KG/M2 | SYSTOLIC BLOOD PRESSURE: 147 MMHG | WEIGHT: 129.44 LBS | TEMPERATURE: 98 F

## 2019-01-25 DIAGNOSIS — Z53.1 REFUSAL OF BLOOD TRANSFUSIONS AS PATIENT IS JEHOVAH'S WITNESS: ICD-10-CM

## 2019-01-25 DIAGNOSIS — D63.1 ANEMIA IN CHRONIC KIDNEY DISEASE, UNSPECIFIED CKD STAGE: Primary | ICD-10-CM

## 2019-01-25 DIAGNOSIS — N18.9 ANEMIA IN CHRONIC KIDNEY DISEASE, UNSPECIFIED CKD STAGE: Primary | ICD-10-CM

## 2019-01-25 PROCEDURE — 96372 THER/PROPH/DIAG INJ SC/IM: CPT | Mod: HCNC

## 2019-01-25 PROCEDURE — 63600175 PHARM REV CODE 636 W HCPCS: Mod: HCNC,JG,EC | Performed by: INTERNAL MEDICINE

## 2019-01-25 RX ADMIN — ERYTHROPOIETIN 20000 UNITS: 20000 INJECTION, SOLUTION INTRAVENOUS; SUBCUTANEOUS at 01:01

## 2019-01-25 NOTE — PLAN OF CARE
Problem: Adult Inpatient Plan of Care  Goal: Plan of Care Review  Outcome: Ongoing (interventions implemented as appropriate)  Arrived to unit. Pt went to lab prior to Infusion. Hg 9.7. Tolerated Procrit. Pt using walker, ambulatory discharged off unit.

## 2019-01-29 ENCOUNTER — OFFICE VISIT (OUTPATIENT)
Dept: RHEUMATOLOGY | Facility: CLINIC | Age: 74
End: 2019-01-29
Payer: MEDICARE

## 2019-01-29 VITALS
SYSTOLIC BLOOD PRESSURE: 156 MMHG | DIASTOLIC BLOOD PRESSURE: 88 MMHG | WEIGHT: 134.25 LBS | HEIGHT: 60 IN | HEART RATE: 92 BPM | BODY MASS INDEX: 26.35 KG/M2

## 2019-01-29 DIAGNOSIS — Z74.09 IMPAIRED MOBILITY: ICD-10-CM

## 2019-01-29 DIAGNOSIS — D84.9 IMMUNOSUPPRESSION: Chronic | ICD-10-CM

## 2019-01-29 DIAGNOSIS — D86.9 SARCOIDOSIS: Primary | Chronic | ICD-10-CM

## 2019-01-29 DIAGNOSIS — Z79.52 CURRENT USE OF STEROID MEDICATION: ICD-10-CM

## 2019-01-29 DIAGNOSIS — R53.83 FATIGUE, UNSPECIFIED TYPE: ICD-10-CM

## 2019-01-29 DIAGNOSIS — R53.81 DEBILITY: ICD-10-CM

## 2019-01-29 DIAGNOSIS — G72.9 MYOPATHY: ICD-10-CM

## 2019-01-29 PROCEDURE — 3079F DIAST BP 80-89 MM HG: CPT | Mod: HCNC,CPTII,S$GLB, | Performed by: INTERNAL MEDICINE

## 2019-01-29 PROCEDURE — 99215 PR OFFICE/OUTPT VISIT, EST, LEVL V, 40-54 MIN: ICD-10-PCS | Mod: HCNC,S$GLB,, | Performed by: INTERNAL MEDICINE

## 2019-01-29 PROCEDURE — 1101F PT FALLS ASSESS-DOCD LE1/YR: CPT | Mod: HCNC,CPTII,S$GLB, | Performed by: INTERNAL MEDICINE

## 2019-01-29 PROCEDURE — 3077F PR MOST RECENT SYSTOLIC BLOOD PRESSURE >= 140 MM HG: ICD-10-PCS | Mod: HCNC,CPTII,S$GLB, | Performed by: INTERNAL MEDICINE

## 2019-01-29 PROCEDURE — 99999 PR PBB SHADOW E&M-EST. PATIENT-LVL IV: ICD-10-PCS | Mod: PBBFAC,HCNC,, | Performed by: INTERNAL MEDICINE

## 2019-01-29 PROCEDURE — 3077F SYST BP >= 140 MM HG: CPT | Mod: HCNC,CPTII,S$GLB, | Performed by: INTERNAL MEDICINE

## 2019-01-29 PROCEDURE — 3079F PR MOST RECENT DIASTOLIC BLOOD PRESSURE 80-89 MM HG: ICD-10-PCS | Mod: HCNC,CPTII,S$GLB, | Performed by: INTERNAL MEDICINE

## 2019-01-29 PROCEDURE — 99999 PR PBB SHADOW E&M-EST. PATIENT-LVL IV: CPT | Mod: PBBFAC,HCNC,, | Performed by: INTERNAL MEDICINE

## 2019-01-29 PROCEDURE — 1101F PR PT FALLS ASSESS DOC 0-1 FALLS W/OUT INJ PAST YR: ICD-10-PCS | Mod: HCNC,CPTII,S$GLB, | Performed by: INTERNAL MEDICINE

## 2019-01-29 PROCEDURE — 99215 OFFICE O/P EST HI 40 MIN: CPT | Mod: HCNC,S$GLB,, | Performed by: INTERNAL MEDICINE

## 2019-01-29 RX ORDER — TIZANIDINE 2 MG/1
4 TABLET ORAL EVERY 8 HOURS PRN
Qty: 270 TABLET | Refills: 0 | Status: SHIPPED | OUTPATIENT
Start: 2019-01-29 | End: 2019-02-08

## 2019-01-29 ASSESSMENT — ROUTINE ASSESSMENT OF PATIENT INDEX DATA (RAPID3)
PAIN SCORE: 10
MDHAQ FUNCTION SCORE: 1
AM STIFFNESS SCORE: 1, YES
PSYCHOLOGICAL DISTRESS SCORE: 1.1
TOTAL RAPID3 SCORE: 7.78
FATIGUE SCORE: 10
WHEN YOU AWAKENED IN THE MORNING OVER THE LAST WEEK, PLEASE INDICATE THE AMOUNT OF TIME IT TAKES UNTIL YOU ARE AS LIMBER AS YOU WILL BE FOR THE DAY: 20 MINUTES
PATIENT GLOBAL ASSESSMENT SCORE: 10

## 2019-01-29 NOTE — PATIENT INSTRUCTIONS
Tizanidine tablets or capsules  What is this medicine?  TIZANIDINE (glenn maki) helps to relieve muscle spasms. It may be used to help in the treatment of multiple sclerosis and spinal cord injury.  How should I use this medicine?  Take this medicine by mouth with a full glass of water. Take this medicine on an empty stomach, at least 30 minutes before or 2 hours after food. Do not take with food unless you talk with your doctor. Follow the directions on the prescription label. Take your medicine at regular intervals. Do not take your medicine more often than directed. Do not stop taking except on your doctor's advice. Suddenly stopping the medicine can be very dangerous.  Talk to your pediatrician regarding the use of this medicine in children.  Patients over 65 years old may have a stronger reaction and need a smaller dose.  What side effects may I notice from receiving this medicine?  Side effects that you should report to your doctor or health care professional as soon as possible:  · allergic reactions like skin rash, itching or hives, swelling of the face, lips, or tongue  · breathing problems  · hallucinations  · signs and symptoms of liver injury like dark yellow or brown urine; general ill feeling or flu-like symptoms; light-colored stools; loss of appetite; nausea; right upper quadrant belly pain; unusually weak or tired; yellowing of the eyes or skin  · signs and symptoms of low blood pressure like dizziness; feeling faint or lightheaded, falls; unusually weak or tired  · unusually slow heartbeat  · unusually weak or tired  Side effects that usually do not require medical attention (report to your doctor or health care professional if they continue or are bothersome):  · blurred vision  · constipation  · dizziness  · dry mouth  · tiredness  What may interact with this medicine?  Do not take this medicine with any of the following  medications:  · ciprofloxacin  · cisapride  · dofetilide  · dronedarone  · fluvoxamine  · narcotic medicines for cough  · pimozide  · thiabendazole  · thioridazine  · ziprasidone  This medicine may also interact with the following medications:  · acyclovir  · alcohol  · antihistamines for allergy, cough and cold  · baclofen  · certain antibiotics like levofloxacin, ofloxacin  · certain medicines for anxiety or sleep  · certain medicines for blood pressure, heart disease, irregular heart beat  · certain medicines for depression like amitriptyline, fluoxetine, sertraline  · certain medicines for seizures like phenobarbital, primidone  · certain medicines for stomach problems like cimetidine, famotidine  · female hormones, like estrogens or progestins and birth control pills, patches, rings, or injections  · general anesthetics like halothane, isoflurane, methoxyflurane, propofol  · local anesthetics like lidocaine, pramoxine, tetracaine  · medicines that relax muscles for surgery  · narcotic medicines for pain  · other medicines that prolong the QT interval (cause an abnormal heart rhythm)  · phenothiazines like chlorpromazine, mesoridazine, prochlorperazine  · ticlopidine  · zileuton  What if I miss a dose?  If you miss a dose, take it as soon as you can. If it is almost time for your next dose, take only that dose. Do not take double or extra doses.  Where should I keep my medicine?  Keep out of the reach of children.  Store at room temperature between 15 and 30 degrees C (59 and 86 degrees F). Throw away any unused medicine after the expiration date.  What should I tell my health care provider before I take this medicine?  They need to know if you have any of these conditions:  · kidney disease  · liver disease  · low blood pressure  · mental disorder  · an unusual or allergic reaction to tizanidine, other medicines, lactose (tablets only), foods, dyes, or preservatives  · pregnant or trying to get  pregnant  · breast-feeding  What should I watch for while using this medicine?  Tell your doctor or health care professional if your symptoms do not start to get better or if they get worse.  You may get drowsy or dizzy. Do not drive, use machinery, or do anything that needs mental alertness until you know how this medicine affects you. Do not stand or sit up quickly, especially if you are an older patient. This reduces the risk of dizzy or fainting spells. Alcohol may interfere with the effect of this medicine. Avoid alcoholic drinks.  If you are taking another medicine that also causes drowsiness, you may have more side effects. Give your health care provider a list of all medicines you use. Your doctor will tell you how much medicine to take. Do not take more medicine than directed. Call emergency for help if you have problems breathing or unusual sleepiness.  Your mouth may get dry. Chewing sugarless gum or sucking hard candy, and drinking plenty of water may help. Contact your doctor if the problem does not go away or is severe.  NOTE:This sheet is a summary. It may not cover all possible information. If you have questions about this medicine, talk to your doctor, pharmacist, or health care provider. Copyright© 2017 [employer]

## 2019-01-29 NOTE — PROGRESS NOTES
Subjective:       Patient ID: Regino Lawrence is a 73 y.o. female.    Chief Complaint: Sarcoidosis    HPI:  Regino Lawrence is a 73 y.o. female with history of sarcoidosis with associated myopathy and   arthropathy. Sarcoidosis that was first manifested by muscle inflammation, low white   blood cell count, hair loss, skin involvement. She was treated in the   past with methotrexate and Plaquenil, both of which were ineffective.   Cellcept and Imuran caused some unknown side effect (she thinks it made her sick).   Colchicine was held due to low WBC.   Although methotrexate did not help in past it was retried and she felt it helped hair growth but did not help body aches.   She held MTX due to an URI but patient has not wanted restarted since then (2013).   Leflunomide was held due to elevated BP after less than a week of use.    Interval History:   Has lumbar spine injection for disc disease.  She may require surgery and will be evaluated next week for surgery.  In meantime she has been unable to walk well and is requesting a scooter.   Hands continue to swell periodically.    Pain >10/10 ache in hands and back.  Feet no longer hurt.  Pain medication helps some.  Activity worsens.    Two weeks ago had hip injection     Alternating prednisone 10 mg and 5 mg and feels well.  Currently on MTX 4 tabs.      Review of Systems   Constitutional: Positive for fatigue.   Respiratory: Positive for shortness of breath.    Cardiovascular: Negative.    Gastrointestinal: Negative.    Endocrine: Negative.    Genitourinary: Negative.    Musculoskeletal: Positive for arthralgias and back pain.   Skin: Negative.    Allergic/Immunologic: Negative.    Neurological: Negative.    Hematological: Negative.    Psychiatric/Behavioral: Negative.          Objective:   BP (!) 156/88   Pulse 92   Ht 5' (1.524 m)   Wt 60.9 kg (134 lb 4.2 oz)   LMP  (LMP Unknown)   BMI 26.22 kg/m²      Physical Exam   Constitutional: She is oriented to person,  place, and time and well-developed, well-nourished, and in no distress.   HENT:   Head: Normocephalic and atraumatic.   Eyes: Conjunctivae and EOM are normal.   Cardiovascular: Normal rate, regular rhythm and normal heart sounds.    Pulmonary/Chest: Effort normal and breath sounds normal.   Abdominal: Soft. Bowel sounds are normal.   Neurological: She is alert and oriented to person, place, and time.   Slow careful gait.  Walker in room   Skin: Skin is warm and dry.     Psychiatric: Mood and affect normal.   Musculoskeletal:   4.5/5 UE and LE proximal strength bilaterally  28 joint count: 10 tender and 10 swollen.  Swollen and tender MCPs  (Unchanged)           LABS    Component      Latest Ref Rng & Units 1/25/2019 12/31/2018 11/2/2018   WBC      3.90 - 12.70 K/uL 6.02  3.30 (L)   RBC      4.00 - 5.40 M/uL 3.14 (L)  3.45 (L)   Hemoglobin      12.0 - 16.0 g/dL 10.7 (L)  11.3 (L)   Hematocrit      37.0 - 48.5 % 32.5 (L)  34.7 (L)   MCV      82 - 98 fL 104 (H)  101 (H)   MCH      27.0 - 31.0 pg 34.1 (H)  32.8 (H)   MCHC      32.0 - 36.0 g/dL 32.9  32.6   RDW      11.5 - 14.5 % 14.7 (H)  14.1   Platelets      150 - 350 K/uL 244  292   MPV      9.2 - 12.9 fL 8.8 (L)  8.8 (L)   Gran # (ANC)      1.8 - 7.7 K/uL 3.7  1.8   Lymph #      1.0 - 4.8 K/uL 1.7  1.2   Mono #      0.3 - 1.0 K/uL 0.5  0.2 (L)   Eos #      0.0 - 0.5 K/uL 0.1  0.1   Baso #      0.00 - 0.20 K/uL 0.03  0.01   Gran%      38.0 - 73.0 % 61.6  54.5   Lymph%      18.0 - 48.0 % 28.4  36.4   Mono%      4.0 - 15.0 % 7.5  5.8   Eosinophil%      0.0 - 8.0 % 2.0  3.0   Basophil%      0.0 - 1.9 % 0.5  0.3   Differential Method       Automated  Automated   Sodium      136 - 145 mmol/L   140   Potassium      3.5 - 5.1 mmol/L   3.6   Chloride      95 - 110 mmol/L   105   CO2      23 - 29 mmol/L   25   Glucose      70 - 110 mg/dL   143 (H)   BUN, Bld      8 - 23 mg/dL   12   Creatinine      0.5 - 1.4 mg/dL   1.0   Calcium      8.7 - 10.5 mg/dL   9.2   Total Protein       6.0 - 8.4 g/dL   6.8   Albumin      3.5 - 5.2 g/dL   3.5   Total Bilirubin      0.1 - 1.0 mg/dL   0.3   Alkaline Phosphatase      55 - 135 U/L   63   AST      10 - 40 U/L   22   ALT      10 - 44 U/L   21   Anion Gap      8 - 16 mmol/L   10   eGFR if African American      >60 mL/min/1.73 m:2   >60   eGFR if non African American      >60 mL/min/1.73 m:2   56 (A)   Aldolase      1.2 - 7.6 U/L   2.7   CPK      20 - 180 U/L   77   CRP      0.0 - 8.2 mg/L   6.9   Sed Rate      0 - 20 mm/Hr   29 (H)   TSH      0.400 - 4.000 uIU/mL  0.310 (L)    Free T4      0.71 - 1.51 ng/dL  1.21           Assessment:       1.   Sarcoidosis. Manifested by myopathy and arthropathy. Persistent joint pain and myalgias despite prednisone 10 mg.  Now with diffuse body pain    2.   Myalgia and myositis.    3.   Osteopenia. Took Fosamax for 5 years stopped 6/2013.  Awaiting patient decision on Prolia after she sees dentis.    4.   Fatigue     5.   Diabetes mellitus type 2 in nonobese     6.           Neck pain. X-ray with degenerative changes. S/p laminectomy-cervical fusion C3-C7 11/16/2015 for cervical spinal stenosis.    7.           Back pain    8.           HTN.  9.           SOB.  When blood count low  10.         Anemia.  On Procrit    Plan:       1. Labs   2. Taper prednisone 5 mg daily daily.  Consider increasing methotrexate to 6 tabs.  Continue folic acid.   Repeat labs today and one month if adjusting MTX.  3. [employer] stopped tramadol.  Now on hydrocodone/acetaminophen from primary doctor.  [employer] now stopped Flexeril.  Will switch to tizanidine.  Risk of sedation discussed.  Patient to try 2 mg first and contact office.    4. Following with nephrology  5. DEXA with osteopenia of hip total and femoral neck. FRAX does not suggest treatment however with prednisone>7.5 mg will consider Prolia (due renal insufficiency).  Information provided for patient to review.  She read information but did not see dentist to have teeth pulled.  6.   Follow with Dr. Conner regarding spine issues.                 RTO in 3-4 months.    Patient seen face to face for 25 minutes and greater than 50% spent in counseling regarding Joint pains,   management of sarcoidosis and pain.

## 2019-01-30 ENCOUNTER — PATIENT MESSAGE (OUTPATIENT)
Dept: RHEUMATOLOGY | Facility: CLINIC | Age: 74
End: 2019-01-30

## 2019-02-01 ENCOUNTER — LAB VISIT (OUTPATIENT)
Dept: LAB | Facility: HOSPITAL | Age: 74
End: 2019-02-01
Attending: INTERNAL MEDICINE
Payer: MEDICARE

## 2019-02-01 ENCOUNTER — PATIENT MESSAGE (OUTPATIENT)
Dept: FAMILY MEDICINE | Facility: CLINIC | Age: 74
End: 2019-02-01

## 2019-02-01 DIAGNOSIS — D63.1 ANEMIA IN CHRONIC KIDNEY DISEASE, UNSPECIFIED CKD STAGE: ICD-10-CM

## 2019-02-01 DIAGNOSIS — N18.9 ANEMIA IN CHRONIC KIDNEY DISEASE, UNSPECIFIED CKD STAGE: ICD-10-CM

## 2019-02-01 DIAGNOSIS — E61.1 IRON DEFICIENCY: ICD-10-CM

## 2019-02-01 LAB
BASOPHILS # BLD AUTO: 0.03 K/UL
BASOPHILS NFR BLD: 0.6 %
DIFFERENTIAL METHOD: ABNORMAL
EOSINOPHIL # BLD AUTO: 0.1 K/UL
EOSINOPHIL NFR BLD: 2.9 %
ERYTHROCYTE [DISTWIDTH] IN BLOOD BY AUTOMATED COUNT: 15.3 %
FERRITIN SERPL-MCNC: 421 NG/ML
HCT VFR BLD AUTO: 34.1 %
HGB BLD-MCNC: 11 G/DL
IMM GRANULOCYTES # BLD AUTO: 0.07 K/UL
IMM GRANULOCYTES NFR BLD AUTO: 1.4 %
IRON SERPL-MCNC: 59 UG/DL
LYMPHOCYTES # BLD AUTO: 1.2 K/UL
LYMPHOCYTES NFR BLD: 24 %
MCH RBC QN AUTO: 33.7 PG
MCHC RBC AUTO-ENTMCNC: 32.3 G/DL
MCV RBC AUTO: 105 FL
MONOCYTES # BLD AUTO: 0.4 K/UL
MONOCYTES NFR BLD: 7.9 %
NEUTROPHILS # BLD AUTO: 3.1 K/UL
NEUTROPHILS NFR BLD: 63.2 %
NRBC BLD-RTO: 1 /100 WBC
PLATELET # BLD AUTO: 310 K/UL
PMV BLD AUTO: 9.3 FL
RBC # BLD AUTO: 3.26 M/UL
SATURATED IRON: 18 %
TOTAL IRON BINDING CAPACITY: 321 UG/DL
TRANSFERRIN SERPL-MCNC: 217 MG/DL
WBC # BLD AUTO: 4.84 K/UL

## 2019-02-01 PROCEDURE — 36415 COLL VENOUS BLD VENIPUNCTURE: CPT | Mod: HCNC,PO

## 2019-02-01 PROCEDURE — 85025 COMPLETE CBC W/AUTO DIFF WBC: CPT | Mod: HCNC

## 2019-02-01 PROCEDURE — 82728 ASSAY OF FERRITIN: CPT | Mod: HCNC

## 2019-02-01 PROCEDURE — 83540 ASSAY OF IRON: CPT | Mod: HCNC

## 2019-02-04 ENCOUNTER — PATIENT MESSAGE (OUTPATIENT)
Dept: RHEUMATOLOGY | Facility: CLINIC | Age: 74
End: 2019-02-04

## 2019-02-04 DIAGNOSIS — R53.83 FATIGUE, UNSPECIFIED TYPE: ICD-10-CM

## 2019-02-04 DIAGNOSIS — D84.9 IMMUNOSUPPRESSION: Chronic | ICD-10-CM

## 2019-02-04 DIAGNOSIS — D86.9 SARCOIDOSIS: Primary | Chronic | ICD-10-CM

## 2019-02-04 DIAGNOSIS — G72.9 MYOPATHY: ICD-10-CM

## 2019-02-05 ENCOUNTER — TELEPHONE (OUTPATIENT)
Dept: HEMATOLOGY/ONCOLOGY | Facility: CLINIC | Age: 74
End: 2019-02-05

## 2019-02-05 ENCOUNTER — OFFICE VISIT (OUTPATIENT)
Dept: HEMATOLOGY/ONCOLOGY | Facility: CLINIC | Age: 74
End: 2019-02-05
Payer: MEDICARE

## 2019-02-05 VITALS
OXYGEN SATURATION: 97 % | HEART RATE: 80 BPM | HEIGHT: 61 IN | TEMPERATURE: 98 F | SYSTOLIC BLOOD PRESSURE: 142 MMHG | BODY MASS INDEX: 24.55 KG/M2 | DIASTOLIC BLOOD PRESSURE: 76 MMHG | WEIGHT: 130.06 LBS

## 2019-02-05 DIAGNOSIS — D63.1 ANEMIA IN CHRONIC KIDNEY DISEASE, UNSPECIFIED CKD STAGE: Primary | ICD-10-CM

## 2019-02-05 DIAGNOSIS — G72.9 MYOPATHY: ICD-10-CM

## 2019-02-05 DIAGNOSIS — E11.22 DIABETES MELLITUS WITH STAGE 3 CHRONIC KIDNEY DISEASE: Chronic | ICD-10-CM

## 2019-02-05 DIAGNOSIS — D50.9 IRON DEFICIENCY ANEMIA, UNSPECIFIED IRON DEFICIENCY ANEMIA TYPE: Primary | ICD-10-CM

## 2019-02-05 DIAGNOSIS — D86.9 SARCOIDOSIS: Chronic | ICD-10-CM

## 2019-02-05 DIAGNOSIS — N18.30 DIABETES MELLITUS WITH STAGE 3 CHRONIC KIDNEY DISEASE: Chronic | ICD-10-CM

## 2019-02-05 DIAGNOSIS — N18.9 ANEMIA IN CHRONIC KIDNEY DISEASE, UNSPECIFIED CKD STAGE: Primary | ICD-10-CM

## 2019-02-05 PROCEDURE — 1101F PT FALLS ASSESS-DOCD LE1/YR: CPT | Mod: HCNC,CPTII,S$GLB, | Performed by: INTERNAL MEDICINE

## 2019-02-05 PROCEDURE — 3078F PR MOST RECENT DIASTOLIC BLOOD PRESSURE < 80 MM HG: ICD-10-PCS | Mod: HCNC,CPTII,S$GLB, | Performed by: INTERNAL MEDICINE

## 2019-02-05 PROCEDURE — 99213 PR OFFICE/OUTPT VISIT, EST, LEVL III, 20-29 MIN: ICD-10-PCS | Mod: HCNC,S$GLB,, | Performed by: INTERNAL MEDICINE

## 2019-02-05 PROCEDURE — 99213 OFFICE O/P EST LOW 20 MIN: CPT | Mod: HCNC,S$GLB,, | Performed by: INTERNAL MEDICINE

## 2019-02-05 PROCEDURE — 1101F PR PT FALLS ASSESS DOC 0-1 FALLS W/OUT INJ PAST YR: ICD-10-PCS | Mod: HCNC,CPTII,S$GLB, | Performed by: INTERNAL MEDICINE

## 2019-02-05 PROCEDURE — 99499 UNLISTED E&M SERVICE: CPT | Mod: HCNC,S$GLB,, | Performed by: INTERNAL MEDICINE

## 2019-02-05 PROCEDURE — 3077F SYST BP >= 140 MM HG: CPT | Mod: HCNC,CPTII,S$GLB, | Performed by: INTERNAL MEDICINE

## 2019-02-05 PROCEDURE — 99499 RISK ADDL DX/OHS AUDIT: ICD-10-PCS | Mod: HCNC,S$GLB,, | Performed by: INTERNAL MEDICINE

## 2019-02-05 PROCEDURE — 3077F PR MOST RECENT SYSTOLIC BLOOD PRESSURE >= 140 MM HG: ICD-10-PCS | Mod: HCNC,CPTII,S$GLB, | Performed by: INTERNAL MEDICINE

## 2019-02-05 PROCEDURE — 99999 PR PBB SHADOW E&M-EST. PATIENT-LVL IV: CPT | Mod: PBBFAC,HCNC,, | Performed by: INTERNAL MEDICINE

## 2019-02-05 PROCEDURE — 3078F DIAST BP <80 MM HG: CPT | Mod: HCNC,CPTII,S$GLB, | Performed by: INTERNAL MEDICINE

## 2019-02-05 PROCEDURE — 99999 PR PBB SHADOW E&M-EST. PATIENT-LVL IV: ICD-10-PCS | Mod: PBBFAC,HCNC,, | Performed by: INTERNAL MEDICINE

## 2019-02-05 RX ORDER — METHOTREXATE 2.5 MG/1
TABLET ORAL
Qty: 72 TABLET | Refills: 0 | Status: SHIPPED | OUTPATIENT
Start: 2019-02-05 | End: 2019-05-02 | Stop reason: SDUPTHER

## 2019-02-05 NOTE — Clinical Note
HOLD PROCRIT THIS WEEK ( HB 11G/[id number] PROCRIT  IN  2WKSCBC Q 2WKS BEG IN 2 WKS-STANDINGFE STUDIES PRIOR TO F/U

## 2019-02-05 NOTE — PROGRESS NOTES
Subjective:       Patient ID: Regino Lawrence is a 73 y.o. female.    Chief Complaint: No chief complaint on file.  Diagnosis: Anemia in CKD  Patient is a Sikh  .  HPI    The patient is seen today for chronic anemia in CKD. The patient reports that she has been diagnosed with JOLLY in the past.  She has been on oral iron supplementation therapy, but could not tolerate or did not respond, she is uncertain.  She is followed by GI and has undergone a colonoscopy earlier this year and was diagnosed with hemorrhoids for which she underwent banding procedure.  No melena, hematochezia,change in bowel habits.  She has also been diagnosed with B12 deficiency in the past, but reports she did not respond to B12 injections. No history of blood transfusions.  She is a Sikh.  She reports that she remembers getting injections in the 1970s when her blood count was low.        She is followed by Rheumatology for history of sarcoidosis with associated myopathy and arthropathy.   .She has been treated in the  past with methotrexate and Plaquenil, both of which were ineffective  Cellcept and imuran caused some unknown side effect.   Colchicine  held due to low WBC   She continues on chronic steroid therapy, Prednisone 10mg qd    Today, she has no new issues  Chronic diffuse arthralgias-stable   Mild  Chronic fatigue-stable  No SOB/CP/cough   She continues to undergo Procrit therapy q 2wks  She undergoes intermittent IV iron therapy      s/p posterior C3-C7 laminectomy and fusion on 11/16/15 by Dr. Conner, with left sided back, hip, and leg pain that is present with standing and resolves with sitting.   MRI showed an L5/S1 HNP that abuts the left L5 nerve root.     She reports worsening back pain and may need to undergo surgical intervention in near future.       Ms. Lawrence is a 72 year old female with a history of cervical spinal stenosis s/p posterior C3-C7 laminectomy and fusion on 11/16/15 by Dr. Conner. She was  "last seen in clinic on 18. At that time, she continued to have complaints of left sided back, hip, and leg pain that was present with standing and resolved with sitting. She was referred to Dr. Richards for a left L5 transforaminal injection which was completed on 10/8/18.             CBC  reveals wbc 4850/mm3  Hb 11  g/dl Hct 34.1% Plt ct 310k    Patient was in the ED 2018 and was seen on   at [facility] for back issues  She is followed by Neurosurgery for cervical spinal stenosis s/p posterior C3-C7 laminectomy and fusion          PAST MEDICAL HISTORY:  Acid reflux, alopecia, anemia, anxiety disorder, chronic  kidney disease, depression, diabetes mellitus type 2, hyperlipidemia,  hypertension, hypothyroidism, osteoporosis, sarcoidosis.    PAST SURGICAL HISTORY:  Cholecystectomy, , tubal ligation, carpal tunnel release, cataracts.    FAMILY HISTORY: Unremarkable for cancer. Significant for HTN.       Review of Systems   Constitutional: Positive for fatigue. Negative for activity change and appetite change.   HENT: Negative for hearing loss and nosebleeds.    Eyes: Negative for visual disturbance.   Respiratory: Negative for cough and shortness of breath.    Cardiovascular: Negative for chest pain and leg swelling.   Gastrointestinal: Negative for abdominal pain, constipation, diarrhea and nausea.   Genitourinary: Negative for flank pain and urgency.   Musculoskeletal: Positive for arthralgias and back pain. Negative for gait problem and joint swelling.   Skin: Negative for rash.        No petechiae, ecchymoses   Neurological: Negative for light-headedness and headaches.   Hematological: Negative for adenopathy. Does not bruise/bleed easily.       Objective:       Vitals:    19 1013   BP: (!) 142/76   BP Location: Right arm   Patient Position: Sitting   BP Method: Small (Manual)   Pulse: 80   Temp: 97.8 °F (36.6 °C)   TempSrc: Oral   SpO2: 97%   Weight: 59 kg (130 lb 1.1 oz)   Height: 5' 0.5" " (1.537 m)       Physical Exam   Constitutional: She is oriented to person, place, and time. She appears well-developed and well-nourished.   HENT:   Head: Normocephalic.   Mouth/Throat: Oropharynx is clear and moist. No oropharyngeal exudate.   Eyes: Conjunctivae and lids are normal. Pupils are equal, round, and reactive to light. No scleral icterus.   Neck: Normal range of motion. Neck supple. No thyromegaly present.   Cardiovascular: Normal rate, regular rhythm and normal heart sounds.   No murmur heard.  Pulmonary/Chest: Breath sounds normal. She has no wheezes. She has no rales.   Abdominal: Soft. Bowel sounds are normal. She exhibits no distension and no mass. There is no hepatosplenomegaly. There is no tenderness. There is no rebound and no guarding.   Musculoskeletal: Normal range of motion. She exhibits no edema or tenderness.   Lymphadenopathy:     She has no cervical adenopathy.     She has no axillary adenopathy.        Right: No supraclavicular adenopathy present.        Left: No supraclavicular adenopathy present.   Neurological: She is alert and oriented to person, place, and time. No cranial nerve deficit. Coordination normal.   Skin: Skin is warm and dry. No ecchymosis, no petechiae and no rash noted. No erythema.   Psychiatric: She has a normal mood and affect.             Lab Results   Component Value Date    WBC 4.84 02/01/2019    HGB 11.0 (L) 02/01/2019    HCT 34.1 (L) 02/01/2019     (H) 02/01/2019     02/01/2019     Lab Results   Component Value Date    IRON 59 02/01/2019    TIBC 321 02/01/2019    FERRITIN 421 (H) 02/01/2019     SPEP-nl          CT renal 3/6/2017   IMPRESSION:  1.  No renal, ureteral or bladder calculi.  No hydronephrosis or ureterectasis.  2.  Poorly distended bladder.  Mild bladder wall prominence.  Mild cystitis cannot be   excluded.  3.  Moderate constipation.  Normal appendix      Lab Results   Component Value Date    IRON 59 02/01/2019    TIBC 321 02/01/2019     FERRITIN 421 (H) 02/01/2019       Assessment:       1. Anemia in chronic kidney disease, unspecified CKD stage    2. Diabetes mellitus with stage 3 chronic kidney disease        Plan:   1-2  Pt clinically stable  Pt is a Anabaptist and declines/not interested in blood and blood products due to Quaker beliefs  Hb 11  g/dl   Ferritin 421 Fe sats 18  Cont procrit to 20,000u q 2wk ( lab parameters)- HOLD THIS WEEK  Pt followed by Nephrology . It has been determinedearly CKD stage III, suspect due to age-related renal nephron loss, along with possible diabetic nephropathy v. hypertensive nephrosclerosis      CBC q2wks STANDING    Follow-up with PCP for med mgmt    F/u 2 mos with Fe studies        CC: Micaela Mendoza M.D.

## 2019-02-06 ENCOUNTER — OFFICE VISIT (OUTPATIENT)
Dept: NEUROSURGERY | Facility: CLINIC | Age: 74
End: 2019-02-06
Payer: MEDICARE

## 2019-02-06 VITALS
SYSTOLIC BLOOD PRESSURE: 160 MMHG | HEART RATE: 94 BPM | WEIGHT: 129 LBS | TEMPERATURE: 98 F | DIASTOLIC BLOOD PRESSURE: 79 MMHG | BODY MASS INDEX: 24.35 KG/M2 | HEIGHT: 61 IN

## 2019-02-06 DIAGNOSIS — M51.16 LUMBAR DISC HERNIATION WITH RADICULOPATHY: Primary | ICD-10-CM

## 2019-02-06 PROCEDURE — 99214 OFFICE O/P EST MOD 30 MIN: CPT | Mod: HCNC,S$GLB,, | Performed by: NEUROLOGICAL SURGERY

## 2019-02-06 PROCEDURE — 1101F PR PT FALLS ASSESS DOC 0-1 FALLS W/OUT INJ PAST YR: ICD-10-PCS | Mod: HCNC,CPTII,S$GLB, | Performed by: NEUROLOGICAL SURGERY

## 2019-02-06 PROCEDURE — 1101F PT FALLS ASSESS-DOCD LE1/YR: CPT | Mod: HCNC,CPTII,S$GLB, | Performed by: NEUROLOGICAL SURGERY

## 2019-02-06 PROCEDURE — 3077F SYST BP >= 140 MM HG: CPT | Mod: HCNC,CPTII,S$GLB, | Performed by: NEUROLOGICAL SURGERY

## 2019-02-06 PROCEDURE — 99999 PR PBB SHADOW E&M-EST. PATIENT-LVL IV: CPT | Mod: PBBFAC,HCNC,, | Performed by: NEUROLOGICAL SURGERY

## 2019-02-06 PROCEDURE — 99999 PR PBB SHADOW E&M-EST. PATIENT-LVL IV: ICD-10-PCS | Mod: PBBFAC,HCNC,, | Performed by: NEUROLOGICAL SURGERY

## 2019-02-06 PROCEDURE — 99214 PR OFFICE/OUTPT VISIT, EST, LEVL IV, 30-39 MIN: ICD-10-PCS | Mod: HCNC,S$GLB,, | Performed by: NEUROLOGICAL SURGERY

## 2019-02-06 PROCEDURE — 3077F PR MOST RECENT SYSTOLIC BLOOD PRESSURE >= 140 MM HG: ICD-10-PCS | Mod: HCNC,CPTII,S$GLB, | Performed by: NEUROLOGICAL SURGERY

## 2019-02-06 PROCEDURE — 3078F DIAST BP <80 MM HG: CPT | Mod: HCNC,CPTII,S$GLB, | Performed by: NEUROLOGICAL SURGERY

## 2019-02-06 PROCEDURE — 3078F PR MOST RECENT DIASTOLIC BLOOD PRESSURE < 80 MM HG: ICD-10-PCS | Mod: HCNC,CPTII,S$GLB, | Performed by: NEUROLOGICAL SURGERY

## 2019-02-06 NOTE — PATIENT INSTRUCTIONS
I have personally reviewed the MRI of lumbar spine with the pt which shows degenerative changes and a single disc herniation. There are no indications for surgical intervention at this time.    I will refer the pt to physical therapy and physical medicine for further evaluation and treatment.   I will schedule the patient for 2 month follow up with one of the PAs to see how she is doing.

## 2019-02-06 NOTE — PROGRESS NOTES
Subjective:   I, Kat Jefferson, attest that this documentation has been prepared under the direction and in the presence of Fab Conner MD.     Patient ID: Regino Lawrence is a 73 y.o. female     Chief Complaint: Follow-up      HPI  Ms. Regino Lawrence is a pleasant 73 y.o. woman with lumbar disc herniation with radiculopathy, lumbar DDD, lumbar stenosis, s/p posterior C3-C7 laminectomy and fusion on 11/16/2015, who presents today for follow up per Dr. Dobson of pain management. Pt has a hx of low back and neck pain and was last seen on 11/13/2018 by Monica Sultana, specifcially for her back-related pain. She was s/p L5 transforaminal injection (on 10/08/18) and reported 100% improvement in her left sided back and LLE pain/paresthesias after the injection for 3.5 weeks before her pain recurred.     Pt states her LLE is unstable and she falls frequently. She states there is a lot of pain associated with this and has difficulty walking. She states she had an RHIANNA in her bilateral lower back and left hip per Dr. Richards . She states nothing really helps when her flare is severe, she is currently on Gabapentin which helps sometimes.  She has not tried physical therapy for her pain    Review of Systems   Musculoskeletal: Positive for gait problem (Instability secondary to pain).      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, without long-term current use of insulin     Depression     Diabetes mellitus, type 2     Eye injury as a child     k-abrasion  od    Hyperlipidemia     Hypertension     Hypothyroidism     Immune deficiency disorder     Immune disorder     Myalgia and myositis 9/6/2012    Osteoporosis     Polyneuropathy     Renal manifestation of  secondary diabetes mellitus     Sarcoidosis     Ulcer     no cancer    Urinary incontinence        Objective:      Vitals:    02/06/19 1104   BP: (!) 160/79   Pulse: 94   Temp: 98 °F (36.7 °C)      Physical Exam       IMAGING:  MRI Lumbar Spine Without Contrast (09/12/2018) At L5-S1, there is a moderate sized left lateral recess/foraminal disc extrusion effacing the left neural foramen, likely impinging upon the exiting left L5 nerve root.          I have personally reviewed the images with the pt.      I, Dr. Fab Conner, personally performed the services described in this documentation. All medical record entries made by the scribe, Kat Jefferson, were at my direction and in my presence.  I have reviewed the chart and agree that the record reflects my personal performance and is accurate and complete. Fab Conner MD. 02/06/2019    Assessment:       Herniated lumbar disc.     Plan:   I have personally reviewed the MRI of lumbar spine with the pt which shows degenerative changes and a single disc herniation. There are no indications for surgical intervention at this time.    I will refer the pt to physical therapy and physical medicine for further evaluation and treatment. Orders placed    I will schedule the patient for 2 month follow up with one of the PAs to see how she is doing.

## 2019-02-13 ENCOUNTER — PATIENT MESSAGE (OUTPATIENT)
Dept: RHEUMATOLOGY | Facility: CLINIC | Age: 74
End: 2019-02-13

## 2019-02-14 ENCOUNTER — OFFICE VISIT (OUTPATIENT)
Dept: PODIATRY | Facility: CLINIC | Age: 74
End: 2019-02-14
Payer: MEDICARE

## 2019-02-14 ENCOUNTER — HOSPITAL ENCOUNTER (OUTPATIENT)
Dept: RADIOLOGY | Facility: HOSPITAL | Age: 74
Discharge: HOME | End: 2019-02-14
Attending: PODIATRIST
Payer: MEDICARE

## 2019-02-14 VITALS
SYSTOLIC BLOOD PRESSURE: 145 MMHG | HEIGHT: 61 IN | BODY MASS INDEX: 24.35 KG/M2 | DIASTOLIC BLOOD PRESSURE: 81 MMHG | WEIGHT: 129 LBS

## 2019-02-14 DIAGNOSIS — M79.672 FOOT PAIN, LEFT: Primary | ICD-10-CM

## 2019-02-14 DIAGNOSIS — S93.402A INVERSION SPRAIN OF ANKLE, LEFT, INITIAL ENCOUNTER: ICD-10-CM

## 2019-02-14 DIAGNOSIS — M25.572 ARTHRALGIA OF LEFT FOOT: ICD-10-CM

## 2019-02-14 DIAGNOSIS — M79.672 FOOT PAIN, LEFT: ICD-10-CM

## 2019-02-14 PROCEDURE — 99999 PR PBB SHADOW E&M-EST. PATIENT-LVL III: ICD-10-PCS | Mod: PBBFAC,HCNC,, | Performed by: PODIATRIST

## 2019-02-14 PROCEDURE — 99214 PR OFFICE/OUTPT VISIT, EST, LEVL IV, 30-39 MIN: ICD-10-PCS | Mod: 25,HCNC,S$GLB, | Performed by: PODIATRIST

## 2019-02-14 PROCEDURE — 99999 PR PBB SHADOW E&M-EST. PATIENT-LVL III: CPT | Mod: PBBFAC,HCNC,, | Performed by: PODIATRIST

## 2019-02-14 PROCEDURE — 3077F PR MOST RECENT SYSTOLIC BLOOD PRESSURE >= 140 MM HG: ICD-10-PCS | Mod: HCNC,CPTII,S$GLB, | Performed by: PODIATRIST

## 2019-02-14 PROCEDURE — 73630 XR FOOT COMPLETE 3 VIEW LEFT: ICD-10-PCS | Mod: 26,HCNC,LT, | Performed by: RADIOLOGY

## 2019-02-14 PROCEDURE — 3079F DIAST BP 80-89 MM HG: CPT | Mod: HCNC,CPTII,S$GLB, | Performed by: PODIATRIST

## 2019-02-14 PROCEDURE — 1101F PT FALLS ASSESS-DOCD LE1/YR: CPT | Mod: HCNC,CPTII,S$GLB, | Performed by: PODIATRIST

## 2019-02-14 PROCEDURE — 73630 X-RAY EXAM OF FOOT: CPT | Mod: TC,HCNC,FY,PO,LT

## 2019-02-14 PROCEDURE — 73630 X-RAY EXAM OF FOOT: CPT | Mod: 26,HCNC,LT, | Performed by: RADIOLOGY

## 2019-02-14 PROCEDURE — 99214 OFFICE O/P EST MOD 30 MIN: CPT | Mod: 25,HCNC,S$GLB, | Performed by: PODIATRIST

## 2019-02-14 PROCEDURE — 20600 DRAIN/INJ JOINT/BURSA W/O US: CPT | Mod: HCNC,LT,S$GLB, | Performed by: PODIATRIST

## 2019-02-14 PROCEDURE — 1101F PR PT FALLS ASSESS DOC 0-1 FALLS W/OUT INJ PAST YR: ICD-10-PCS | Mod: HCNC,CPTII,S$GLB, | Performed by: PODIATRIST

## 2019-02-14 PROCEDURE — 3079F PR MOST RECENT DIASTOLIC BLOOD PRESSURE 80-89 MM HG: ICD-10-PCS | Mod: HCNC,CPTII,S$GLB, | Performed by: PODIATRIST

## 2019-02-14 PROCEDURE — 20600 PR DRAIN/INJECT SMALL JOINT/BURSA: ICD-10-PCS | Mod: HCNC,LT,S$GLB, | Performed by: PODIATRIST

## 2019-02-14 PROCEDURE — 3077F SYST BP >= 140 MM HG: CPT | Mod: HCNC,CPTII,S$GLB, | Performed by: PODIATRIST

## 2019-02-14 RX ORDER — DEXAMETHASONE SODIUM PHOSPHATE 4 MG/ML
2 INJECTION, SOLUTION INTRA-ARTICULAR; INTRALESIONAL; INTRAMUSCULAR; INTRAVENOUS; SOFT TISSUE ONCE
Status: COMPLETED | OUTPATIENT
Start: 2019-02-14 | End: 2019-02-14

## 2019-02-14 RX ORDER — TRIAMCINOLONE ACETONIDE 40 MG/ML
20 INJECTION, SUSPENSION INTRA-ARTICULAR; INTRAMUSCULAR ONCE
Status: COMPLETED | OUTPATIENT
Start: 2019-02-14 | End: 2019-02-14

## 2019-02-14 RX ORDER — BUPIVACAINE HYDROCHLORIDE 5 MG/ML
1 INJECTION, SOLUTION EPIDURAL; INTRACAUDAL ONCE
Status: COMPLETED | OUTPATIENT
Start: 2019-02-14 | End: 2019-02-14

## 2019-02-14 RX ORDER — LIDOCAINE HYDROCHLORIDE 20 MG/ML
1 INJECTION, SOLUTION EPIDURAL; INFILTRATION; INTRACAUDAL; PERINEURAL ONCE
Status: COMPLETED | OUTPATIENT
Start: 2019-02-14 | End: 2019-02-14

## 2019-02-14 RX ADMIN — DEXAMETHASONE SODIUM PHOSPHATE 2 MG: 4 INJECTION, SOLUTION INTRA-ARTICULAR; INTRALESIONAL; INTRAMUSCULAR; INTRAVENOUS; SOFT TISSUE at 10:02

## 2019-02-14 RX ADMIN — TRIAMCINOLONE ACETONIDE 20 MG: 40 INJECTION, SUSPENSION INTRA-ARTICULAR; INTRAMUSCULAR at 10:02

## 2019-02-14 RX ADMIN — BUPIVACAINE HYDROCHLORIDE 5 MG: 5 INJECTION, SOLUTION EPIDURAL; INTRACAUDAL at 10:02

## 2019-02-14 RX ADMIN — LIDOCAINE HYDROCHLORIDE 20 MG: 20 INJECTION, SOLUTION EPIDURAL; INFILTRATION; INTRACAUDAL; PERINEURAL at 10:02

## 2019-02-14 NOTE — PATIENT INSTRUCTIONS
Recommend lotions: eucerin, eucerin for diabetics, aquaphor, A&D ointment, gold bond for diabetics, sween, Wellington's Bees all purpose baby ointment,  urea 40 with aloe (found on amazon.com)    Shoe recommendations: (try 6pm.com, zappos.com , nordstromrack.Marfeel, or shoes.Marfeel for discounted prices) you can visit [employer] shoes in [place]  or [employer] [place] [employer] [facility] in the [facility] (there are also several shoe brand outlets in the [facility])    Asics (GT 2000 or gel foundations), new balance stability type shoes (such as the 940 series), saucony (stabil c3),  [place] (GTS or Beast or transcend), propet (tennis shoe)    Sofft Brand (women) Robert&Juan (men), clarks, crocs, aerosoles, naturalizers, SAS, ecco, born, zoey emmanuel, rockports (dress shoes)    Vionic, burkenstocks, fitflops, propet (sandals)  Nike comfort thong sandals, crocs, propet (house shoes)    Nail Home remedy:  Vicks Vapor rub to nails for easier manageability        What Is Arthritis in the Foot?  Degenerative arthritis is a condition that slowly wears away joints, the area where bones meet and move. In the beginning, you may notice that the affected joint seems stiff. It may even ache. As the joint lining (cartilage) breaks down, the bones rub against each other, causing pain and swelling. Over time, small pieces of rough or splintered bone (bone spurs) develop, and the joints range of motion becomes limited. But movement doesnt have to cause pain. The effects of arthritis can be reduced.    The big-toe joint  When arthritis affects your big toe, your foot hurts when it pushes off the ground. Arthritis often appears in the big-toe joint along with a bunion (a bony bump at the side of the joint) or a bone spur on top of the joint.    Other joints  When arthritis affects the rear or midfoot joints, you feel pain when you put weight on your foot. Arthritis may affect the joint where the ankle and foot meet. It may also affect other joints nearby.  Date Last Reviewed:  7/1/2016  © 0451-0694 [employer]. [address] [place] [address], [place] [address]. All rights reserved. This information is not intended as a substitute for professional medical care. Always follow your healthcare professional's instructions.        Treating Arthritis in the Foot  If your symptoms are mild, medications may be enough to reduce pain and swelling. For more severe arthritis, surgery may be needed to improve the condition of the joint.    Medicine  Your doctor may prescribe medicine--pills or injections--to limit pain and swelling. Ice, aspirin, acetaminophen, or ibuprofen may help relieve mild symptoms that occur after activity.  Surgery and bone trimming  To ease movement and reduce pain, your doctor may trim damaged bone. If arthritis is severe, the joint may be fused or removed. If the bone is not damaged too badly, your doctor may simply shave away bone spurs. Any excess bone growth related to a bunion may also be trimmed.  Fusing joints  If damage is more severe, your doctor may fuse the joint to prevent the bones from rubbing. Afterward, staples, plates, or screws may hold the bones in place so they heal properly. In some cases, the joint may be removed and replaced with an implant.  After surgery  During the early stages of recovery, your foot is likely to be bandaged and immobilized for a while. For best results, follow up with your doctor as scheduled. These visits help ensure that your foot heals properly.  As you heal  After surgery, youll be told how to care for your incision and how soon to begin walking on the foot. Until the foot can bear weight, you may need to walk with crutches or a cane.  For surgery on the big toe, your foot may be splinted to limit movement for several weeks. Despite this, you should be able to walk soon after surgery.  For surgery on rear or midfoot joints, you may need to wear a cast or surgical shoe. These joints are fairly large, so full recovery may  take a few months. Once the bone has healed, any staples, plates, or screws may be removed.  Date Last Reviewed: 7/1/2016 © 2000-2017 [employer]. [address] [place] [address], [place] [address]. All rights reserved. This information is not intended as a substitute for professional medical care. Always follow your healthcare professional's instructions.        Foot Surgery: Degenerative Joint Disease    Degenerative joint disease (arthritis) often happens in the joint of a big toe. This bone growth may cause pain and stiffness in the joint. Left untreated, arthritis can break down the cartilage and destroy the joint. Your treatment choices depend on how damaged your joint is. There are many nonsurgical treatments, but if these are not helpful, surgery may be considered.    Cheilectomy  This is done when the arthritic joint and cartilage can be saved. A bone spur caused by arthritis may be symptomatic on the top of the big toe joint. The procedure involves removing this bone spur, usually with a small part of the top of the joint itself.  You will need to wear a surgical shoe for several weeks. Once the foot heals, joint movement is restored.    Fusion  In fusion, the cartilage and some bone on both sides of the joint are removed. Then, the big toe and metatarsal bones are held together with staples, screws, or a plate and screws. Your foot may be placed in a cast. While you heal, you will be asked not to bear weight on this foot. You may also need crutches for several weeks. Because the joint has been removed, your toe will be less flexible.    Arthroplasty  During surgery, bone growth caused by the arthritis is trimmed, and part of the joint is removed. A pin can be used to align the bones and to keep them from touching. The pin is removed after several weeks. In some cases, the entire joint may be replaced with an implant. You may have to wear a splint or a surgical shoe for several weeks. When healed,  the bones become connected with scar tissue.  Date Last Reviewed: 10/15/2015  © 4073-9595 [employer]. [address] [place] [address], [place] [address]. All rights reserved. This information is not intended as a substitute for professional medical care. Always follow your healthcare professional's instructions.

## 2019-02-14 NOTE — PROGRESS NOTES
Subjective:      Patient ID: Regino Lawrence is a 73 y.o. female.    Chief Complaint: Foot Pain (edema and pain in left foot (PCP Dr Mendoza 12/12/18))    Regino is a 73 y.o. female who presents to the clinic for evaluation and treatment of high risk feet. 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 The patient's chief complaint is  left foot midpain. Description: severe Nature: aching, sharp and throbbing Location: midfoot and ankles Onset of the symptoms was several months ago. Precipitating event: hip pain.  History of injury: no Current symptoms include: worsening symptoms after a period of activity. Aggravating factors: any weight bearing. Alleviating factors: rest Symptoms have gradually worsened. Patient has had prior foot problems. Evaluation to date: seen by neurology, neurosurgery, pain management. Treatment to date: steroid injections to the hip, rx shoes. Patients rates pain 10/10 on pain scale.   This patient has documented high risk feet requiring routine maintenance secondary to diabetes mellitis and those secondary complications of diabetes, as mentioned..      PCP: Micaela Mendoza MD    Date Last Seen by PCP:   Chief Complaint   Patient presents with    Foot Pain     edema and pain in left foot (PCP Dr Mendoza 12/12/18)     Current shoe gear:  Tennis shoes     Hemoglobin A1C   Date Value Ref Range Status   01/22/2018 6.0 (H) 4.0 - 5.6 %  Final     Comment:     According to ADA guidelines, hemoglobin A1c <7.0% represents  optimal control in non-pregnant diabetic patients. Different  metrics may apply to specific patient populations.   Standards of Medical Care in Diabetes-2016.  For the purpose of screening for the presence of diabetes:  <5.7%     Consistent with the absence of diabetes  5.7-6.4%  Consistent with increasing risk for diabetes   (prediabetes)  >or=6.5%  Consistent with diabetes  Currently, no consensus exists for use of hemoglobin A1c  for diagnosis of diabetes for children.  This Hemoglobin A1c assay has significant interference with fetal   hemoglobin   (HbF). The results are invalid for patients with abnormal amounts of   HbF,   including those with known Hereditary Persistence   of Fetal Hemoglobin. Heterozygous hemoglobin variants (HbAS, HbAC,   HbAD, HbAE, HbA2) do not significantly interfere with this assay;   however, presence of multiple variants in a sample may impact the %   interference.     06/30/2017 5.9 (H) 4.0 - 5.6 % Final     Comment:     According to ADA guidelines, hemoglobin A1c <7.0% represents  optimal control in non-pregnant diabetic patients. Different  metrics may apply to specific patient populations.   Standards of Medical Care in Diabetes-2016.  For the purpose of screening for the presence of diabetes:  <5.7%     Consistent with the absence of diabetes  5.7-6.4%  Consistent with increasing risk for diabetes   (prediabetes)  >or=6.5%  Consistent with diabetes  Currently, no consensus exists for use of hemoglobin A1c  for diagnosis of diabetes for children.  This Hemoglobin A1c assay has significant interference with fetal   hemoglobin   (HbF). The results are invalid for patients with abnormal amounts of   HbF,   including those with known Hereditary Persistence   of Fetal Hemoglobin. Heterozygous hemoglobin variants (HbAS, HbAC,   HbAD, HbAE, HbA2) do not significantly interfere with this assay;   however,  presence of multiple variants in a sample may impact the %   interference.     11/29/2016 5.6 4.5 - 6.2 % Final     Comment:     According to ADA guidelines, hemoglobin A1C <7.0% represents  optimal control in non-pregnant diabetic patients.  Different  metrics may apply to specific populations.   Standards of Medical Care in Diabetes - 2016.  For the purpose of screening for the presence of diabetes:  <5.7%     Consistent with the absence of diabetes  5.7-6.4%  Consistent with increasing risk for diabetes   (prediabetes)  >or=6.5%  Consistent with diabetes  Currently no consensus exists for use of hemoglobin A1C  for diagnosis of diabetes for children.       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, without long-term current use of insulin    Sarcoidosis    Corneal scar, right eye    Nuclear sclerosis    Senile nuclear sclerosis    Immunosuppression    Vitamin B12 deficiency anemia    Left shoulder pain    Cervical spinal stenosis    Patient is Jehovah's witness    GERD (gastroesophageal reflux disease)    Debility    S/P cervical spinal fusion    Chronic bilateral low back pain with left-sided sciatica    Degenerative disc disease, lumbar    Poor motor control of trunk    Impaired mobility    Muscle weakness    Iron deficiency anemia    Anemia in CKD (chronic kidney disease)    Refusal of blood transfusions as patient is Jehovah's witness    Current use of steroid medication    DM type 2 without retinopathy    Pseudophakia    Insufficiency of tear film of both  eyes    Refractive error    Myopathy    Osteopenia    Calcification of aorta    Dry eye syndrome, bilateral    Neck pain    Lumbar disc herniation with radiculopathy    Acute left-sided low back pain without sciatica    Antalgic gait    Lumbar radiculopathy    Lumbar stenosis with neurogenic claudication    Chronic bilateral low back pain without sciatica    Anemia in chronic kidney disease    Greater trochanteric bursitis of left hip     Current Outpatient Medications on File Prior to Visit   Medication Sig Dispense Refill    ACCU-CHEK FASTCLIX LANCING DEV Kit USE AS DIRECTED. 1 each 0    ACCU-CHEK SMARTVIEW TEST STRIP Strp TEST  ONCE  A   strip 11    ALCOHOL ANTISEPTIC PADS (ALCOHOL PREP PADS TOP)       amLODIPine (NORVASC) 10 MG tablet Take 1 tablet (10 mg total) by mouth once daily. 90 tablet 1    blood-glucose meter kit Use as instructed 1 each 0    carvedilol (COREG) 25 MG tablet Take 1 tablet (25 mg total) by mouth 2 (two) times daily. 180 tablet 3    epoetin german (PROCRIT INJ) Inject 1,000 Units as directed.      fenofibrate 160 MG Tab Take 1 tablet (160 mg total) by mouth once daily. 90 tablet 1    fexofenadine (ALLEGRA ALLERGY) 180 MG tablet Take 180 mg by mouth daily as needed.       folic acid (FOLVITE) 1 MG tablet Take 1 tablet (1 mg total) by mouth once daily. 90 tablet 0    gabapentin (NEURONTIN) 400 MG capsule Take 1 capsule (400 mg total) by mouth 2 (two) times daily. 180 capsule 1    HYDROcodone-acetaminophen (NORCO) 5-325 mg per tablet Take 1 tablet by mouth every 6 (six) hours as needed. 90 tablet 0    levothyroxine (SYNTHROID) 50 MCG tablet TAKE 1 TABLET BY MOUTH ONCE DAILY BEFORE BREAKFAST 90 tablet 1    metFORMIN (GLUCOPHAGE) 1000 MG tablet Take 1 tablet (1,000 mg total) by mouth 2 (two) times daily with meals. 180 tablet 1    methotrexate 2.5 MG Tab TAKE 6 TABLETS BY MOUTH EVERY 7 DAYS 72 tablet 0    predniSONE (DELTASONE) 10 MG tablet       calcium  carbonate (OS-MALCOLM) 600 mg calcium (1,500 mg) Tab Take 1 tablet by mouth 2 (two) times daily.       cloNIDine (CATAPRES) 0.3 MG tablet Take 1 tablet (0.3 mg total) by mouth 3 (three) times daily. 270 tablet 1     No current facility-administered medications on file prior to visit.      Review of patient's allergies indicates:   Allergen Reactions    Azathioprine Shortness Of Breath and Other (See Comments)     Fatigue     Past Surgical History:   Procedure Laterality Date    CARPAL TUNNEL RELEASE      Rt wrist    CATARACT EXTRACTION W/  INTRAOCULAR LENS IMPLANT Right 2015    Dr. Azevedo    CATARACT EXTRACTION W/  INTRAOCULAR LENS IMPLANT Left 2015    Dr. Azevedo     SECTION      CHOLECYSTECTOMY      COLPOCLEISIS-- [place] N/A 2016    Performed by Meredith Becker DO at [facility], [place], operated by [facility] OR    CYSTOSCOPY N/A 2016    Performed by Meredith Becker DO at [facility], [place], operated by [facility] [place]    Injection,steroid,epidural,transforaminal approach    Bilateral L5 Bilateral 2018    Performed by Alfonso Richards MD at [facility], [place], operated by [facility] PAIN MGT    Injection,steroid,epidural,transforaminal approach  Left L5-S1 Left 10/8/2018    Performed by Alfonso Richards MD at [facility], [place], operated by [facility] PAIN MGT    INSERTION-INTRAOCULAR LENS (IOL) Left 2015    [facility] at [facility] OR    INSERTION-INTRAOCULAR LENS (IOL) Right 2015    Performed by [facility] at [facility] [place]    LAMINECTOMY-CERVICAL/FUSION-POSTERIOR C3-C7 N/A 2015    Performed by Fab Con[facility] at [facility] OR 2ND FLR    PHACOEMULSIFICATION-ASPIRATION-CATARACT Left 2015    Performed by [facility] at [facility] [place]    PHACOEMULSIFICATION-ASPIRATION-CATARACT Right 2015    Performed by Elio [facility]edo MD at [facility]    REPAIR-POSTERIOR N/A 2016    Performed by Meredith Becker DO at [facility], [place], operated [facility] [place]    SLING-TRANSOBTERATOR TAPE N/A 2016    Performed by Meredith Becker DO at [facility], [place], [facility] [place]    TUBAL LIGATION        Family History   Problem Relation Age of Onset    Hypertension Mother     Cataracts Mother     No Known Problems Father     Hypertension Maternal Grandmother     Glaucoma Sister     Arthritis Sister     No Known Problems Brother     No Known Problems Maternal Aunt     No Known Problems Maternal Uncle     No Known Problems Paternal Aunt     No Known Problems Paternal Uncle     No Known Problems Maternal Grandfather     No Known Problems Paternal Grandmother     No Known Problems Paternal Grandfather     Kidney failure Sister     Hepatitis Sister     Cancer Sister         bone cancer     Immunodeficiency Sister     Lupus Neg Hx     Rheum arthritis Neg Hx     Amblyopia Neg Hx     Blindness Neg Hx     Diabetes Neg Hx     Macular degeneration Neg Hx     Retinal detachment Neg Hx     Strabismus Neg Hx     Stroke Neg Hx     Thyroid disease Neg Hx     Endometrial cancer Neg Hx     Vaginal cancer Neg Hx     Cervical cancer Neg Hx      Social History     Socioeconomic History    Marital status:      Spouse name: Not on file    Number of children: Not on file    Years of education: Not on file    Highest education level: Not on file   Social Needs    Financial resource strain: Not on file    Food insecurity - worry: Not on file    Food insecurity - inability: Not on file    Transportation needs - medical: Not on file    Transportation needs - non-medical: Not on file   Occupational History    Not on file   Tobacco Use    Smoking status: Never Smoker    Smokeless tobacco: Never Used   Substance and Sexual Activity    Alcohol use: No    Drug use: No    Sexual activity: Yes     Partners: Male   Other Topics Concern    Not on file   Social History Narrative    Not on file       Review of Systems   Constitution: Positive for weakness. Negative for chills and fever.        She feels tired and weak   Cardiovascular: Negative for chest pain, claudication and leg swelling.  "  Respiratory: Negative for cough and shortness of breath.    Skin: Positive for dry skin and nail changes. Negative for itching and rash.   Musculoskeletal: Positive for arthritis, back pain, joint pain, muscle weakness, myalgias and neck pain. Negative for falls and joint swelling.   Gastrointestinal: Positive for nausea. Negative for diarrhea and vomiting.   Neurological: Positive for loss of balance, numbness, paresthesias and sensory change. Negative for tremors.   Psychiatric/Behavioral: Negative for altered mental status and hallucinations.           Objective:       Vitals:    02/14/19 1011   BP: (!) 145/81   Weight: 58.5 kg (129 lb)   Height: 5' 0.5" (1.537 m)   PainSc: 10-Worst pain ever       Physical Exam   Constitutional:   General: Pt. is well-developed, well-nourished, appears stated age, in no acute distress, alert and oriented x 3. No evidence of depression, anxiety, or agitation. Calm, cooperative, and communicative. Appropriate interactions and affect.       Cardiovascular:   Pulses:       Dorsalis pedis pulses are 1+ on the right side, and 1+ on the left side.        Posterior tibial pulses are 1+ on the right side, and 1+ on the left side.   There is decreased digital hair. Skin is atrophic, hyperpigmented, and mildly edematous       Musculoskeletal:        Right ankle: She exhibits swelling. No tenderness. Achilles tendon exhibits no pain, no defect and normal Naik's test results.        Left ankle: She exhibits swelling. Tenderness. AITFL and CF ligament tenderness found. Achilles tendon exhibits no pain and no defect.        Right foot: There is decreased range of motion and tenderness.        Left foot: There is decreased range of motion, tenderness (midfoot) and crepitus (midfoot).   5/5 muscle strength Bilaterally. There is some limitation of dorsiflexion with knees extended Bilaterally, adequate with knees flexed.     Decreased stride, station of gait.  apropulsive toe off.  Increased " angle and base of gait.    Patient has hammertoes of digits 2-5 bilateral partially reducible without symptom today.    Tailor bunion present 5th mtpj right with medial deviation of 5th toe, prominent bony bump lateral 5th mtpj, and pain to palpation without evidence of trauma or infection.   Neurological: A sensory deficit is present.   [place]-Ingrid 5.07 monofilamant testing is diminished Ted feet. Sharp/dull sensation diminished Bilaterally.   Skin: Skin is warm, dry and intact. No abrasion, no ecchymosis, no lesion and no rash noted. She is not diaphoretic. No cyanosis or erythema. No pallor. Nails show no clubbing.   Toenails 1-5 bilaterally are thickened by 2-3 mm, discolored/yellowed, dystrophic, brittle with subungual debris.    Interdigital Spaces clean, dry and without evidence of break in skin integrity   Psychiatric: She has a normal mood and affect. Her speech is normal.   Nursing note and vitals reviewed.        Assessment:       Encounter Diagnoses   Name Primary?    Foot pain, left Yes    Arthralgia of left foot     Inversion sprain of ankle, left, initial encounter          Plan:       Regino was seen today for foot pain.    Diagnoses and all orders for this visit:    Foot pain, left  -     X-Ray Foot Complete Left; Future    Arthralgia of left foot  -     X-Ray Foot Complete Left; Future    Inversion sprain of ankle, left, initial encounter  -     X-Ray Foot Complete Left; Future    Other orders  -     bupivacaine (PF) 0.5% (5 mg/mL) injection 5 mg  -     dexamethasone injection 2 mg  -     lidocaine (PF) 20 mg/ml (2%) injection 20 mg  -     triamcinolone acetonide injection 20 mg      I counseled the patient on her conditions, their implications and medical management.    Greater than 50% of this visit spent on counseling and coordination of care.    Patient education on arthralgias of the foot. Discussed non-surgical treatment options, including injection, supportive shoegear, inserts.      Patient instructed on adequate icing techniques. Patient should ice the affected area at least 10 minutes when inflammed. I advised the patient that extra icing would also be beneficial to ensure adequate anti inflammatory effect.     Patient would like injection today. Skin was prepped with alcohol and anesthetized with ethyl chloride.  The following mixture was injected into dorsal 2nd metatarsal- medial cuneiform joint approach: 3ccs of mixture of (1cc 1% plain Lidocaine : 1cc 0.5% Marcaine plain:  0.5cc kenalog-40 : 0.5cc dexamethasone) directly into problematic areas.  Patient  tolerated the injection well.     Discussed ankle sprains and importance of immobilization for healing as well as the lengthy course of treatment for complete resolution.    Tubigrip to LLE; patient is to elevate legs. When sleeping, place a pillow under lower extremities. When sitting, support the legs so that they are level with the waist.    Patient instructed to rest affected limb, avoid excessive WB and use crutch or walker assistance if needed.    Instructions on elevation to reduce pain and swelling. When sleeping, place a pillow under the injured leg. When sitting, support the injured leg so it is level with your waist.  Patient instructed on adequate icing techniques. Patient should ice the affected area at least once per day x 10 minutes for 10 days . I advised the  patient that extra icing would also be beneficial to ensure adequate anti inflammatory effect     RTC in 8-10 weeks

## 2019-02-20 DIAGNOSIS — M51.36 DEGENERATIVE DISC DISEASE, LUMBAR: ICD-10-CM

## 2019-02-20 RX ORDER — HYDROCODONE BITARTRATE AND ACETAMINOPHEN 5; 325 MG/1; MG/1
1 TABLET ORAL EVERY 6 HOURS PRN
Qty: 90 TABLET | Refills: 0 | Status: CANCELLED | OUTPATIENT
Start: 2019-02-20 | End: 2019-03-22

## 2019-02-20 NOTE — TELEPHONE ENCOUNTER
Patient contacted. Advised that Dr. Mendoza has another Rx for [place] #[id number] at [facility].   She said that she will get it tomorrow.

## 2019-02-22 ENCOUNTER — LAB VISIT (OUTPATIENT)
Dept: LAB | Facility: HOSPITAL | Age: 74
End: 2019-02-22
Attending: INTERNAL MEDICINE
Payer: MEDICARE

## 2019-02-22 DIAGNOSIS — N18.9 ANEMIA IN CHRONIC KIDNEY DISEASE, UNSPECIFIED CKD STAGE: ICD-10-CM

## 2019-02-22 DIAGNOSIS — D63.1 ANEMIA IN CHRONIC KIDNEY DISEASE, UNSPECIFIED CKD STAGE: ICD-10-CM

## 2019-02-22 LAB
BASOPHILS # BLD AUTO: 0.02 K/UL
BASOPHILS NFR BLD: 0.3 %
DIFFERENTIAL METHOD: ABNORMAL
EOSINOPHIL # BLD AUTO: 0.1 K/UL
EOSINOPHIL NFR BLD: 1.3 %
ERYTHROCYTE [DISTWIDTH] IN BLOOD BY AUTOMATED COUNT: 14.4 %
HCT VFR BLD AUTO: 33.8 %
HGB BLD-MCNC: 11.1 G/DL
LYMPHOCYTES # BLD AUTO: 1.7 K/UL
LYMPHOCYTES NFR BLD: 27.2 %
MCH RBC QN AUTO: 34.2 PG
MCHC RBC AUTO-ENTMCNC: 32.8 G/DL
MCV RBC AUTO: 104 FL
MONOCYTES # BLD AUTO: 0.5 K/UL
MONOCYTES NFR BLD: 7.6 %
NEUTROPHILS # BLD AUTO: 3.9 K/UL
NEUTROPHILS NFR BLD: 63.6 %
PLATELET # BLD AUTO: 263 K/UL
PMV BLD AUTO: 8.4 FL
RBC # BLD AUTO: 3.25 M/UL
WBC # BLD AUTO: 6.06 K/UL

## 2019-02-22 PROCEDURE — 36415 COLL VENOUS BLD VENIPUNCTURE: CPT | Mod: HCNC

## 2019-02-22 PROCEDURE — 85025 COMPLETE CBC W/AUTO DIFF WBC: CPT | Mod: HCNC

## 2019-02-25 ENCOUNTER — OFFICE VISIT (OUTPATIENT)
Dept: SPINE | Facility: CLINIC | Age: 74
End: 2019-02-25
Attending: NEUROLOGICAL SURGERY
Payer: MEDICARE

## 2019-02-25 VITALS
WEIGHT: 128.75 LBS | HEIGHT: 60 IN | SYSTOLIC BLOOD PRESSURE: 140 MMHG | DIASTOLIC BLOOD PRESSURE: 78 MMHG | TEMPERATURE: 98 F | BODY MASS INDEX: 25.28 KG/M2 | HEART RATE: 94 BPM

## 2019-02-25 DIAGNOSIS — M70.62 TROCHANTERIC BURSITIS OF LEFT HIP: ICD-10-CM

## 2019-02-25 DIAGNOSIS — M51.36 DDD (DEGENERATIVE DISC DISEASE), LUMBAR: ICD-10-CM

## 2019-02-25 DIAGNOSIS — M25.552 LEFT HIP PAIN: Primary | ICD-10-CM

## 2019-02-25 PROCEDURE — 1101F PT FALLS ASSESS-DOCD LE1/YR: CPT | Mod: HCNC,CPTII,S$GLB, | Performed by: PHYSICAL MEDICINE & REHABILITATION

## 2019-02-25 PROCEDURE — 3077F SYST BP >= 140 MM HG: CPT | Mod: HCNC,CPTII,S$GLB, | Performed by: PHYSICAL MEDICINE & REHABILITATION

## 2019-02-25 PROCEDURE — 3078F DIAST BP <80 MM HG: CPT | Mod: HCNC,CPTII,S$GLB, | Performed by: PHYSICAL MEDICINE & REHABILITATION

## 2019-02-25 PROCEDURE — 3078F PR MOST RECENT DIASTOLIC BLOOD PRESSURE < 80 MM HG: ICD-10-PCS | Mod: HCNC,CPTII,S$GLB, | Performed by: PHYSICAL MEDICINE & REHABILITATION

## 2019-02-25 PROCEDURE — 99214 OFFICE O/P EST MOD 30 MIN: CPT | Mod: HCNC,S$GLB,, | Performed by: PHYSICAL MEDICINE & REHABILITATION

## 2019-02-25 PROCEDURE — 99214 PR OFFICE/OUTPT VISIT, EST, LEVL IV, 30-39 MIN: ICD-10-PCS | Mod: HCNC,S$GLB,, | Performed by: PHYSICAL MEDICINE & REHABILITATION

## 2019-02-25 PROCEDURE — 99999 PR PBB SHADOW E&M-EST. PATIENT-LVL III: CPT | Mod: PBBFAC,HCNC,, | Performed by: PHYSICAL MEDICINE & REHABILITATION

## 2019-02-25 PROCEDURE — 1101F PR PT FALLS ASSESS DOC 0-1 FALLS W/OUT INJ PAST YR: ICD-10-PCS | Mod: HCNC,CPTII,S$GLB, | Performed by: PHYSICAL MEDICINE & REHABILITATION

## 2019-02-25 PROCEDURE — 3077F PR MOST RECENT SYSTOLIC BLOOD PRESSURE >= 140 MM HG: ICD-10-PCS | Mod: HCNC,CPTII,S$GLB, | Performed by: PHYSICAL MEDICINE & REHABILITATION

## 2019-02-25 PROCEDURE — 99999 PR PBB SHADOW E&M-EST. PATIENT-LVL III: ICD-10-PCS | Mod: PBBFAC,HCNC,, | Performed by: PHYSICAL MEDICINE & REHABILITATION

## 2019-02-25 NOTE — PROGRESS NOTES
Subjective:      Patient ID: Regino Lawrence is a 73 y.o. female.    Chief Complaint: No chief complaint on file.    Ms Lawrence is a 72 yo female sent in consultation by Dr. Conner for evaluation of low back pain.  s/p posterior C3-C7 laminectomy and fusion on 11/16/2015.  She has had low back for years.  She feels like the pain returned about a month ago.  She feels like the injection on 12/6/2018 was helpful.  She has been having left leg pain and hard to left her leg to walk.  The pain is in the back and the buttock and outside of the hip and the outside of the leg.  The hip pain hurts with walking.  She feels like it is hard to sleep at night.  She cannot sleep on left side.  She had a left bursa injection with one month worth of relief.  She feels like she cannot walk when the left leg hurts.  She feels like the L5 Tf also helped the left leg pain.  She feels like low back pain is gradually coming back.  She thought leg pain coming from foot, but injection in foot helped the foot pain. Pain is 10/10 now, worst 10/10 standing and walking, 10/10 best the past week.  She has not been to hciropracto.  She has been to PT, and is scheduled to go    Interventional Therapies:   10/8/18 - left L5 transforaminal epidural steroid injection - good relief of back pain no relief of left lower extremity symptoms  12/6/18 - bilateral L5 transforaminal epidural steroid injection - good relief of back pain no relief of left lower extremity symptoms    MRI lumbar 9/2018  The distal cord/conus demonstrates normal size and signal.  No evidence of osteomyelitis, marrow replacement process, or acute fracture.  No paraspinal masses.    At L1-2, there is asymmetric disc bulging to the right, resulting in mild right-sided neural foraminal narrowing.  No spinal canal stenosis.    L1-2 is unremarkable.    L2-3 demonstrates mild disc bulging slightly asymmetric to the left resulting in mild left-sided neural foraminal narrowing.  No spinal  canal stenosis.    L4-5 demonstrates minimal anterolisthesis anterolisthesis.  There is mild facet arthropathy and disc bulging.  This results in mild left-sided neural foraminal narrowing.  No spinal canal stenosis.    At L5-S1, there is a moderate sized left lateral recess/foraminal disc extrusion effacing the left neural foramen, likely impinging upon the exiting left L5 nerve root.  There is left-sided degenerative disc disease, noting disc height loss and sub endplate marrow edema.  There is moderate bilateral facet arthropathy.  No spinal canal stenosis.    Impression      Moderate size left foraminal disc extrusion at L5-S1, likely impinging upon the exiting left L5 nerve root.    Additional lumbar spondylosis, resulting in mild neural foraminal narrowing at L1-2, L2-3, and L4-5, as above.  No spinal canal stenosis.    X-ray hip  No evidence for acute fracture, dislocation or destructive process.  Marginal osteophytes noted bilaterally, slightly more pronounced on the left.  Joint spaces appear maintained.  Degenerative changes of the lumbar spine are evident.  SI joints and sacrum demonstrate no acute finding.  Moderate amount of stool is seen throughout the colon.  Surrounding soft tissues appear unremarkable.    Ct cervical 7/2018  There is postsurgical change of posterior spinal fusion and laminectomies from C3 to C7 with bilateral lateral mass screws, stabilization rods, and bone material.  No surrounding lucency to suggest loosening.  No hardware fracture or other evidence of hardware failure.    There is straightening of the normal cervical lordosis.  The vertebral body heights appear relatively well-maintained.  There is no evidence of fracture or osseous lytic or blastic process.  There is intervertebral disc space height loss, most significant at C5-C6 and C6-C7. Focal degenerative changes are described below.    C2 -- C3: Posterior disc osteophyte complex and facet arthropathy without significant  spinal canal stenosis or neuroforaminal narrowing.    C3 -- C4: Postsurgical change with posterior disc osteophyte complex without significant spinal canal stenosis or neuroforaminal narrowing.    C4 -- C5: Postsurgical change with posterior disc osteophyte complex, mild uncovertebral spurring, and facet arthropathy resulting in mild left neuroforaminal narrowing.  No significant spinal canal stenosis.    C5 -- C6: Postsurgical change with posterior disc osteophyte complex, facet arthropathy, and uncovertebral spurring resulting in mild right and severe left neuroforaminal narrowing.  No significant spinal canal stenosis.    C6 -- C7: Postsurgical change with posterior disc osteophyte complex, uncovertebral spurring, and facet arthropathy resulting in mild bilateral neuroforaminal narrowing.  No significant spinal canal stenosis.    C7 -- T1: No significant disc abnormality, spinal canal stenosis, or neuroforaminal narrowing.    The spinal canal otherwise appears unremarkable, noting evaluation at postsurgical levels is somewhat limited due to metallic artifact.  No intradural abnormalities are identified.  Evaluation of the surrounding soft tissues reveals unchanged appearance of a small osseous body within the left posterior neck.  A 0.9 x 0.7 cm soft tissue opacity within the posterior left parotid gland.  This focus has slightly enlarged since CT 03/23/2016.  There is biapical pulmonary scarring.    Impression      Postsurgical change of C3-C7 laminectomy with posterior instrumented spinal fusion, unchanged.    Cervical spondylosis, most significant at C5-C6 with severe left and mild right neural foraminal narrowing at this level.  Additional multilevel neural foraminal narrowing as detailed above.  No significant spinal canal stenosis at any level.    0.9 cm left parotid soft tissue opacity, slightly enlarged since CT 03/23/2016 and possibly correlating with a prominent intraparotid lymph node or adenoma.   Further evaluation is recommended with dedicated parotid ultrasound.      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, without   long-term current use of insulin  No date: Depression  No date: Diabetes mellitus, type 2  as a child : Eye injury      Comment:  k-abrasion  od  No date: Hyperlipidemia  No date: Hypertension  No date: Hypothyroidism  No date: Immune deficiency disorder  No date: Immune disorder  2012: Myalgia and myositis  No date: Osteoporosis  No date: Polyneuropathy  No date: Renal manifestation of secondary diabetes mellitus  No date: Sarcoidosis  No date: Ulcer      Comment:  no cancer  No date: Urinary incontinence    Past Surgical History:  No date: CARPAL TUNNEL RELEASE      Comment:  Rt wrist  2015: CATARACT EXTRACTION W/  INTRAOCULAR LENS IMPLANT; Right      Comment:  Dr. Azevedo  2015: CATARACT EXTRACTION W/  INTRAOCULAR LENS IMPLANT; Left      Comment:  Dr. Aezvedo  No date:  SECTION  No date: CHOLECYSTECTOMY  2016: COLPOCLEISIS-- [place]; N/A      Comment:  Performed by Meredith Becker DO at [facility] OR  2016: CYSTOSCOPY; N/A      Comment:  Performed by Meredith Becker DO at [facility] OR  2018: Injection,steroid,epidural,transforaminal approach      Bilateral L5; Bilateral      Comment:  Performed by Alfonso Richards MD at [facility] PAIN T  10/8/2018: Injection,steroid,epidural,transforaminal approach  Left   L5-S1; Left      Comment:  Performed by Alfonso Richards MD at [facility] PAIN T  2015: INSERTION-INTRAOCULAR LENS (IOL); Left      Comment:  Performed by Elio Azevedo MD at [facility] OR  2015: INSERTION-INTRAOCULAR LENS (IOL); Right      Comment:   Performed by Elio Azevedo MD at [facility] OR  11/16/2015: LAMINECTOMY-CERVICAL/FUSION-POSTERIOR C3-C7; N/A      Comment:  Performed by Fab Conner MD at [facility] OR 2ND FLR  5/21/2015: PHACOEMULSIFICATION-ASPIRATION-CATARACT; Left      Comment:  Performed by Elio Azevedo MD at [facility] OR  4/30/2015: PHACOEMULSIFICATION-ASPIRATION-CATARACT; Right      Comment:  Performed by Elio Azevedo MD at [facility] OR  11/28/2016: REPAIR-POSTERIOR; N/A      Comment:  Performed by Meredith Becker DO at [facility] OR  11/28/2016: SLING-TRANSOBTERATOR TAPE; N/A      Comment:  Performed by Meredith Becker DO at [facility] OR  No date: TUBAL LIGATION    Review of patient's family history indicates:  Problem: Hypertension      Relation: Mother          Age of Onset: (Not Specified)  Problem: Cataracts      Relation: Mother          Age of Onset: (Not Specified)  Problem: No Known Problems      Relation: Father          Age of Onset: (Not Specified)  Problem: Hypertension      Relation: Maternal Grandmother          Age of Onset: (Not Specified)  Problem: Glaucoma      Relation: Sister          Age of Onset: (Not Specified)  Problem: Arthritis      Relation: Sister          Age of Onset: (Not Specified)  Problem: No Known Problems      Relation: Brother          Age of Onset: (Not Specified)  Problem: No Known Problems      Relation: Maternal Aunt          Age of Onset: (Not Specified)  Problem: No Known Problems      Relation: Maternal Uncle          Age of Onset: (Not Specified)  Problem: No Known Problems      Relation: Paternal Aunt          Age of Onset: (Not Specified)  Problem: No Known Problems      Relation: Paternal Uncle          Age of Onset: (Not Specified)  Problem: No Known Problems      Relation: Maternal Grandfather          Age of Onset: (Not Specified)  Problem: No Known Problems      Relation: Paternal Grandmother          Age of Onset: (Not Specified)  Problem: No Known Problems      Relation:  Paternal Grandfather          Age of Onset: (Not Specified)  Problem: Kidney failure      Relation: Sister          Age of Onset: (Not Specified)  Problem: Hepatitis      Relation: Sister          Age of Onset: (Not Specified)  Problem: Cancer      Relation: Sister          Age of Onset: (Not Specified)          Comment: bone cancer   Problem: Immunodeficiency      Relation: Sister          Age of Onset: (Not Specified)  Problem: Lupus      Relation: Neg Hx          Age of Onset: (Not Specified)  Problem: Rheum arthritis      Relation: Neg Hx          Age of Onset: (Not Specified)  Problem: Amblyopia      Relation: Neg Hx          Age of Onset: (Not Specified)  Problem: Blindness      Relation: Neg Hx          Age of Onset: (Not Specified)  Problem: Diabetes      Relation: Neg Hx          Age of Onset: (Not Specified)  Problem: Macular degeneration      Relation: Neg Hx          Age of Onset: (Not Specified)  Problem: Retinal detachment      Relation: Neg Hx          Age of Onset: (Not Specified)  Problem: Strabismus      Relation: Neg Hx          Age of Onset: (Not Specified)  Problem: Stroke      Relation: Neg Hx          Age of Onset: (Not Specified)  Problem: Thyroid disease      Relation: Neg Hx          Age of Onset: (Not Specified)  Problem: Endometrial cancer      Relation: Neg Hx          Age of Onset: (Not Specified)  Problem: Vaginal cancer      Relation: Neg Hx          Age of Onset: (Not Specified)  Problem: Cervical cancer      Relation: Neg Hx          Age of Onset: (Not Specified)      Social History    Socioeconomic History      Marital status:       Spouse name: Not on file      Number of children: Not on file      Years of education: Not on file      Highest education level: Not on file    Social Needs      Financial resource strain: Not on file      Food insecurity - worry: Not on file      Food insecurity - inability: Not on file      Transportation needs - medical: Not on file       Transportation needs - non-medical: Not on file    Occupational History      Not on file    Tobacco Use      Smoking status: Never Smoker      Smokeless tobacco: Never Used    Substance and Sexual Activity      Alcohol use: No      Drug use: No      Sexual activity: Yes        Partners: Male    Other Topics      Concerns:        Not on file    Social History Narrative      Not on file      Current Outpatient Medications:  ACCU-CHEK FASTCLIX LANCING DEV Kit, USE AS DIRECTED., Disp: 1 each, Rfl: 0  ACCU-CHEK SMARTVIEW TEST STRIP Strp, TEST  ONCE  A  DAY, Disp: 100 strip, Rfl: 11  ALCOHOL ANTISEPTIC PADS (ALCOHOL PREP PADS TOP), , Disp: , Rfl:   amLODIPine (NORVASC) 10 MG tablet, Take 1 tablet (10 mg total) by mouth once daily., Disp: 90 tablet, Rfl: 1  blood-glucose meter kit, Use as instructed, Disp: 1 each, Rfl: 0  calcium carbonate (OS-MALCOLM) 600 mg calcium (1,500 mg) Tab, Take 1 tablet by mouth 2 (two) times daily. , Disp: , Rfl:   carvedilol (COREG) 25 MG tablet, Take 1 tablet (25 mg total) by mouth 2 (two) times daily., Disp: 180 tablet, Rfl: 3  cloNIDine (CATAPRES) 0.3 MG tablet, Take 1 tablet (0.3 mg total) by mouth 3 (three) times daily., Disp: 270 tablet, Rfl: 1  epoetin german (PROCRIT INJ), Inject 1,000 Units as directed., Disp: , Rfl:   fenofibrate 160 MG Tab, Take 1 tablet (160 mg total) by mouth once daily., Disp: 90 tablet, Rfl: 1  fexofenadine (ALLEGRA ALLERGY) 180 MG tablet, Take 180 mg by mouth daily as needed. , Disp: , Rfl:   folic acid (FOLVITE) 1 MG tablet, Take 1 tablet (1 mg total) by mouth once daily., Disp: 90 tablet, Rfl: 0  gabapentin (NEURONTIN) 400 MG capsule, Take 1 capsule (400 mg total) by mouth 2 (two) times daily., Disp: 180 capsule, Rfl: 1  HYDROcodone-acetaminophen (NORCO) 5-325 mg per tablet, Take 1 tablet by mouth every 6 (six) hours as needed., Disp: 90 tablet, Rfl: 0  levothyroxine (SYNTHROID) 50 MCG tablet, TAKE 1 TABLET BY MOUTH ONCE DAILY BEFORE BREAKFAST, Disp: 90 tablet, Rfl:  1  metFORMIN (GLUCOPHAGE) 1000 MG tablet, Take 1 tablet (1,000 mg total) by mouth 2 (two) times daily with meals., Disp: 180 tablet, Rfl: 1  methotrexate 2.5 MG Tab, TAKE 6 TABLETS BY MOUTH EVERY 7 DAYS, Disp: 72 tablet, Rfl: 0  predniSONE (DELTASONE) 10 MG tablet, , Disp: , Rfl:     No current facility-administered medications for this visit.       Review of patient's allergies indicates:  No Known Allergies            Review of Systems   Constitution: Negative for weight gain and weight loss.   Cardiovascular: Negative for chest pain.   Respiratory: Positive for shortness of breath.    Musculoskeletal: Positive for back pain (left leg) and joint pain (left foot). Negative for joint swelling.   Gastrointestinal: Positive for nausea. Negative for abdominal pain, bowel incontinence and vomiting.   Genitourinary: Negative for bladder incontinence.   Neurological: Negative for numbness and paresthesias.         Objective:        General: Regino is well-developed, well-nourished, appears stated age, in no acute distress, alert and oriented to time, place and person.     General    Vitals reviewed.  Constitutional: She is oriented to person, place, and time. She appears well-developed and well-nourished.   HENT:   Head: Normocephalic and atraumatic.   Pulmonary/Chest: Effort normal.   Neurological: She is alert and oriented to person, place, and time.   Psychiatric: She has a normal mood and affect. Her behavior is normal. Judgment and thought content normal.     General Musculoskeletal Exam   Gait: normal     Back (L-Spine & T-Spine) / Neck (C-Spine) Exam     Tenderness Right paramedian tenderness of the Sacrum. Left paramedian tenderness of the Sacrum.     Back (L-Spine & T-Spine) Range of Motion   Extension: 0   Flexion: 70   Lateral bend right: 10   Lateral bend left: 10   Rotation right: 30   Rotation left: 30     Spinal Sensation   Right Side Sensation  C-Spine Level: normal   L-Spine Level: normal  S-Spine Level:  normal  Left Side Sensation  C-Spine Level: normal  L-Spine Level: normal  S-Spine Level: normal    Back (L-Spine & T-Spine) Tests   Right Side Tests  Straight leg raise:      Sitting SLR: > 70 degrees      Left Side Tests  Straight leg raise:     Sitting SLR: > 70 degrees          Other She has no scoliosis .  Spinal Kyphosis:  Absent      Muscle Strength   Right Upper Extremity   Biceps: 5/5/5   Deltoid:  5/5  Triceps:  5/5  Wrist extension: 5/5/5   Finger Flexors:  5/5  Left Upper Extremity  Biceps: 5/5/5   Deltoid:  5/5  Triceps:  5/5  Wrist extension: 5/5/5   Finger Flexors:  5/5  Right Lower Extremity   Hip Flexion: 5/5   Quadriceps:  5/5   Anterior tibial:  5/5/5  EHL:  5/5  Left Lower Extremity   Hip Flexion: 5/5   Quadriceps:  5/5   Anterior tibial:  5/5/5   EHL:  5/5    Reflexes     Left Side  Biceps:  2+  Triceps:  2+  Brachioradialis:  2+  Quadriceps:  2+  Achilles:  2+  Left Silverman's Sign:  Absent  Babinski Sign:  absent    Right Side   Biceps:  2+  Triceps:  2+  Brachioradialis:  2+  Quadriceps:  2+  Achilles:  2+  Right Silverman's Sign:  absent  Babinski Sign:  absent    Vascular Exam     Right Pulses        Carotid:                  2+    Left Pulses        Carotid:                  2+              Assessment:       1. Left hip pain    2. Trochanteric bursitis of left hip    3. DDD (degenerative disc disease), lumbar           Plan:       Orders Placed This Encounter    Procedure Order to [facility]    Ambulatory Referral to Physical/Occupational Therapy     More than 50% of the total time of 45 minutes was spent in counseling on diagnosis and treatment options. We discussed back pain and the nature of back pain.  We discussed that it is not one thing that causes the pain but an accumulation of multiple things that we do.   We discussed she needs to watch her injections.  She has had the one a month for the past couple of months in different spots.   We discussed posture sitting and  the importance of trying to sit better.  We discussed the benefits of therapy and exercise and continuing to move.  We discussed she needs to work on therapy and getting stronger.  She feels weak, and feels like needs to be stronger  1.   We discussed trying a hip joint injection.  She did get 100% relief from left hip bursa injection.  She does have some joint osteophytes  2.  We discussed PT and working on strengthening.  She is scheduled to start PT on 3/11.  Some new orders were written to include the hip  3.  She needs to limit additional steroid injections.  She has been to multiple providers recently  4.  RTC 6-8 weeks        Follow-up: Follow-up in about 6 weeks (around 4/8/2019). If there are any questions prior to this, the patient was instructed to contact the office.

## 2019-02-25 NOTE — LETTER
February 25, 2019      Fab Conner MD  [address]  [facility] [address]           [facility]  [address], [address]  [facility] [address]  Phone: [phone]  Fax: [phone]          Patient: Regino Lawrence   MR Number: [id number]   YOB: 1945   Date of Visit: 2/25/2019       Dear Dr. Fab Conner:    Thank you for referring Regino Lawrence to me for evaluation. Attached you will find relevant portions of my assessment and plan of care.    If you have questions, please do not hesitate to call me. I look forward to following Regino Lawrence along with you.    Sincerely,    Elaine Biggs MD    Enclosure  CC:  No Recipients    If you would like to receive this communication electronically, please contact [email or web address] or [phone] to request more information on [employer] Link access.    For providers and/or their staff who would like to refer a patient to [facility], please contact us through our one-stop-shop provider referral line, [facility], at [phone].    If you feel you have received this communication in error or would no longer like to receive these types of communications, please e-mail [email or web address]

## 2019-02-26 ENCOUNTER — PES CALL (OUTPATIENT)
Dept: ADMINISTRATIVE | Facility: CLINIC | Age: 74
End: 2019-02-26

## 2019-02-26 ENCOUNTER — PATIENT MESSAGE (OUTPATIENT)
Dept: RHEUMATOLOGY | Facility: CLINIC | Age: 74
End: 2019-02-26

## 2019-02-26 ENCOUNTER — TELEPHONE (OUTPATIENT)
Dept: PAIN MEDICINE | Facility: CLINIC | Age: 74
End: 2019-02-26

## 2019-02-26 DIAGNOSIS — M51.36 DDD (DEGENERATIVE DISC DISEASE), LUMBAR: ICD-10-CM

## 2019-02-26 DIAGNOSIS — M25.552 LEFT HIP PAIN: Primary | ICD-10-CM

## 2019-02-26 DIAGNOSIS — M70.62 TROCHANTERIC BURSITIS OF LEFT HIP: ICD-10-CM

## 2019-02-26 NOTE — TELEPHONE ENCOUNTER
Contacted patient to schedule procedure.    Procedure date, time, and instructions given and mailed.    Patient verbalized understanding.

## 2019-03-08 ENCOUNTER — LAB VISIT (OUTPATIENT)
Dept: LAB | Facility: HOSPITAL | Age: 74
End: 2019-03-08
Attending: INTERNAL MEDICINE
Payer: MEDICARE

## 2019-03-08 DIAGNOSIS — D63.1 ANEMIA IN CHRONIC KIDNEY DISEASE, UNSPECIFIED CKD STAGE: ICD-10-CM

## 2019-03-08 DIAGNOSIS — N18.9 ANEMIA IN CHRONIC KIDNEY DISEASE, UNSPECIFIED CKD STAGE: ICD-10-CM

## 2019-03-08 LAB
BASOPHILS # BLD AUTO: 0.01 K/UL
BASOPHILS NFR BLD: 0.2 %
DIFFERENTIAL METHOD: ABNORMAL
EOSINOPHIL # BLD AUTO: 0.1 K/UL
EOSINOPHIL NFR BLD: 2.6 %
ERYTHROCYTE [DISTWIDTH] IN BLOOD BY AUTOMATED COUNT: 13.9 %
HCT VFR BLD AUTO: 32.9 %
HGB BLD-MCNC: 11 G/DL
LYMPHOCYTES # BLD AUTO: 1.7 K/UL
LYMPHOCYTES NFR BLD: 33.9 %
MCH RBC QN AUTO: 34.5 PG
MCHC RBC AUTO-ENTMCNC: 33.4 G/DL
MCV RBC AUTO: 103 FL
MONOCYTES # BLD AUTO: 0.4 K/UL
MONOCYTES NFR BLD: 7 %
NEUTROPHILS # BLD AUTO: 2.8 K/UL
NEUTROPHILS NFR BLD: 56.3 %
PLATELET # BLD AUTO: 285 K/UL
PMV BLD AUTO: 8.6 FL
RBC # BLD AUTO: 3.19 M/UL
WBC # BLD AUTO: 5.01 K/UL

## 2019-03-08 PROCEDURE — 85025 COMPLETE CBC W/AUTO DIFF WBC: CPT | Mod: HCNC

## 2019-03-08 PROCEDURE — 36415 COLL VENOUS BLD VENIPUNCTURE: CPT | Mod: HCNC

## 2019-03-11 ENCOUNTER — CLINICAL SUPPORT (OUTPATIENT)
Dept: REHABILITATION | Facility: HOSPITAL | Age: 74
End: 2019-03-11
Attending: NEUROLOGICAL SURGERY
Payer: MEDICARE

## 2019-03-11 DIAGNOSIS — R29.3 POOR POSTURE: ICD-10-CM

## 2019-03-11 DIAGNOSIS — M62.81 MUSCLE WEAKNESS OF LOWER EXTREMITY: ICD-10-CM

## 2019-03-11 DIAGNOSIS — Z74.09 IMPAIRED FUNCTIONAL MOBILITY, BALANCE, GAIT, AND ENDURANCE: ICD-10-CM

## 2019-03-11 PROCEDURE — G8978 MOBILITY CURRENT STATUS: HCPCS | Mod: CL,HCNC,PN

## 2019-03-11 PROCEDURE — 97161 PT EVAL LOW COMPLEX 20 MIN: CPT | Mod: HCNC,PN

## 2019-03-11 PROCEDURE — G8979 MOBILITY GOAL STATUS: HCPCS | Mod: CK,HCNC,PN

## 2019-03-11 NOTE — PROGRESS NOTES
[facility]  Physical Therapy Initial Evaluation    See full physical therapy evaluation in POC.     TREATMENT   Treatment Time In: 1155  Treatment Time Out: 1202  Total Treatment time separate from Evaluation: 7 minutes    Regino received therapeutic exercises to develop strength, endurance, ROM, flexibility, posture and core stabilization for 7 minutes including:    Bridges 2 x 10    Home Exercises and Patient Education Provided    Education provided:   - importance of B hip strengthening for balance    Written Home Exercises Provided: yes. (bridges)  Exercises were reviewed and Regino was able to demonstrate them prior to the end of the session.  Regino demonstrated good  understanding of the education provided.     See EMR under Patient Instructions for exercises provided 3/11/2019.    Assessment   Regino is a 73 y.o. female referred to outpatient Physical Therapy with a medical diagnosis of lumbar disc herniation with radiculopathy. Pt presents with reports of low back pain, B LE weakness, decreased lumbar AROM, poor posture, decreased balance, muscle tightness, and impaired gait, endurance, and functional mobility. Special tests indicate decreased lumbar stability.     Pt prognosis is fair to good.  Pt will benefit from skilled outpatient Physical Therapy to address the deficits stated above and in the chart below, provide pt/family education, and to maximize pt's level of independence.     Plan of care discussed with patient: Yes  Pt's spiritual, cultural and educational needs considered and patient is agreeable to the plan of care and goals as stated below:     Anticipated Barriers for therapy: transportation    Medical Necessity is demonstrated by the following  History  Co-morbidities and personal factors that may impact the plan of care Co-morbidities:   advanced age, diabetes, HTN and prior spinal surgery, CKD, osteoporosis    Personal Factors:   lifestyle     high    Examination  Body Structures and Functions, activity limitations and participation restrictions that may impact the plan of care Body Regions:   back  lower extremities  trunk    Body Systems:    gross symmetry  ROM  strength  gross coordinated movement  balance  gait  transfers  motor control  motor learning    Participation Restrictions:   ADLs, IADLs, domestic duties    Activity limitations:   Learning and applying knowledge  no deficits    General Tasks and Commands  no deficits    Communication  no deficits    Mobility  lifting and carrying objects  walking    Self care  washing oneself (bathing, drying, washing hands)    Domestic Life  shopping  cooking  doing house work (cleaning house, washing dishes, laundry)    Interactions/Relationships  no deficits    Life Areas  no deficits    Community and Social Life  recreation and leisure         high   Clinical Presentation stable and uncomplicated low   Decision Making/ Complexity Score: low     Medical necessity is demonstrated by the following IMPAIRMENTS/PROBLEM LIST:   1) Increase in pain level limiting function   2) LE weakness   3) Difficulty walking long distances   4) Hip flexor/quad tightness   5) Lack of HEP    GOALS: Short Term Goals:  6 weeks  1. Report decreased low back pain  <  / =  5/10 at worst to increase tolerance for prolonged standing.   2. Pt will be able to tolerate multi-directional LE strengthening in order to improve ability to perform household chores.  3. Pt will report 50% improvement in ability to walk long distances since start of care to indicate improved functional mobility.   4. Pt will increase B Ely's test to 100 deg to indicate improved hip flexor/quad flexibility and posture.  5. Pt to tolerate HEP to improve ROM and independence with ADL's.    Long Term Goals: 12 weeks  1. Report decreased low back pain  <  / =  3/10 at worst to increase tolerance for prolonged standing.   2. Pt will be able to perform 2 x 10  multi-directional LE strengthening without fatigue in order to improve ability to perform household chores.  3. Pt will report 80% improvement in ability to walk long distances since start of care to indicate improved functional mobility.   4. Pt will increase B Ely's test to 110 deg to indicate improved hip flexor/quad flexibility and posture.  5. Pt to be Independent with HEP to improve ROM and independence with ADL's.    Plan   Plan of care Certification: 3/11/2019 to 6/11/19.    Outpatient Physical Therapy 2 times weekly for 12 weeks to include the following interventions: Cervical/Lumbar Traction, Gait Training, Manual Therapy, Moist Heat/ Ice, Neuromuscular Re-ed, Patient Education, Therapeutic Activites and Therapeutic Exercise.     Jessie Fox, PT

## 2019-03-11 NOTE — PLAN OF CARE
[facility]  Physical Therapy Initial Evaluation    Name: Regino Lawrence  Clinic Number: [id number]    Therapy Diagnosis:   Encounter Diagnoses   Name Primary?    Muscle weakness of lower extremity     Poor posture     Impaired functional mobility, balance, gait, and endurance      Physician: Fab Conner MD    Physician Orders: PT Eval and Treat - fall prevention  Medical Diagnosis from Referral: Lumbar disc herniation with radiculopathy  Evaluation Date: 3/11/2019  Authorization Period Expiration: 19  Plan of Care Expiration: 19  Visit # / Visits authorized:     Time In: 1122 (pt late to session)  Time Out: 1202  Total Billable Time: 40 minutes    Precautions: Diabetes, Fall and osteoporosis, B foot neuropathy, uncontrolled BP, CKD    Subjective   Date of onset: insidiously low back pain about 1 month ago  History of current condition - Aranammeghna reports: long history of back pain since  with gradual worsening since. Intermittent injections have given some improvement for only limited length of time.     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, without long-term current use of insulin     Depression     Diabetes mellitus, type 2     Eye injury as a child     k-abrasion  od    Hyperlipidemia     Hypertension     Hypothyroidism     Immune deficiency disorder     Immune disorder     Myalgia and myositis 2012    Osteoporosis     Polyneuropathy     Renal manifestation of secondary diabetes mellitus     Sarcoidosis     Ulcer     no cancer    Urinary incontinence        Surgical History:   Regino Lawrence  has a past surgical history that includes Cholecystectomy;   "section; Tubal ligation; Carpal tunnel release; Cataract extraction w/  intraocular lens implant (Right, 2015); and Cataract extraction w/  intraocular lens implant (Left, 2015).  Posterior C3-C7 laminectomy and fusion on 11/16/15    Medications:   Regino has a current medication list which includes the following prescription(s): accu-chek fastclix lancing dev, accu-chek smartview test strip, alcohol antiseptic pads, amlodipine, blood-glucose meter, calcium carbonate, carvedilol, clonidine, epoetin german, fenofibrate, fexofenadine, folic acid, gabapentin, hydrocodone-acetaminophen, levothyroxine, metformin, methotrexate, and prednisone.    Allergies:   Review of patient's allergies indicates:  No Known Allergies     Imagin18 lumbar x-ray revealed: "Scoliosis and degenerative change."  18 lumbar MRI revealed: "Moderate size left foraminal disc extrusion at L5-S1, likely impinging upon the exiting left L5 nerve root.  Additional lumbar spondylosis, resulting in mild neural foraminal narrowing at L1-2, L2-3, and L4-5, as above.  No spinal canal stenosis."    Prior Therapy/treatment: L5 transforaminal injection (on 10/08/18) with little improvements, Dec 2018 injection in B hips with improvements, plans for hip joint injection on 2019  Social History: lives with their family  Physical activity: sedentary  DME: rollator, [employer] for home, WC  Home environment: 1 story home with 0 steps to enter, just got ramp installed for use of WC and future scooter  Last fall: last  when walking- no serious injuries  Occupation: not employed  Prior Level of Function: requires assistance with bathing and dressing  Current Level of Function: difficulty walking long distances, prolonged standing, household chores, sit to stand transfer    Pain:  Current 5/10, worst 10/10, best 5/10   Location: bilateral back   Description: stabbing  Aggravating Factors: Standing, Walking and Getting out of " bed/chair  Easing Factors: pain medication, rest and sitting   Radicular symptoms: numbness and tingling from low back to posterior aspect of B legs down to calf  Pain with coughing/sneezing, changes in B&B, sleep disturbance: increase in pain with cough/sneeze, no changes in B&B, no sleep disturbance    Pts goals: to improve walking and reduce pain    Objective     Observation: pleasant and cooperative, increased postural sway in static standing with fear of falling    Gait: amb w rollator, increased forward trunk lean, decreased B step length    Posture: excessive cervical flexion, forward head, rounded shoulders, slouched  Stands with decreased weight on L LE    Lumbar Range of Motion:    %   Flexion 100     Extension 80     Left Side Bending 100   Right Side Bending 100   Left rotation   0   Right Rotation   0    *= pain    Lower Extremity Strength    Right LE  Left LE    Hip flexion: 4+/5 Hip flexion: 4+/5   Knee extension: 5/5 Knee extension: 4+/5   Knee flexion: 5/5 Knee flexion: 4+/5   Hip extension:  2/5 Hip extension: 2/5   Hip abduction: 4+/5 Hip abduction: 4-/5   Hip adduction: 4-/5 Hip adduction 3-/5   Ankle dorsiflexion: 4+/5 Ankle dorsiflexion: 4/5   Ankle plantarflexion: 5/5 Ankle plantarflexion: 5/5     Special Tests:  -Bridge Test: pain with double leg    Neuro Dynamic Testing:    Sciatic nerve:      SLR: B negative    Sensation: decreased sensation to light touch in B LEs (L foot and ankle, R foot)    Flexibility:    Ely's test: R = 90 degrees ; L = 90 degrees   Popliteal Angle: B WNL    CMS Impairment/Limitation/Restriction for FOTO lumbar spine Survey    Therapist reviewed FOTO scores for Regino Lawrence on 3/11/2019.   FOTO documents entered into [employer] - see Media section.    Limitation Score: 71%  Category: Mobility    Current : CL = least 60% but < 80% impaired, limited or restricted  Goal: CK = at least 40% but < 60% impaired, limited or restricted  Discharge: CL = least 60% but < 80%  impaired, limited or restricted       TREATMENT   Treatment Time In: 1155  Treatment Time Out: 1202  Total Treatment time separate from Evaluation: 7 minutes    Regino received therapeutic exercises to develop strength, endurance, ROM, flexibility, posture and core stabilization for 7 minutes including:    Bridges 2 x 10    Home Exercises and Patient Education Provided    Education provided:   - importance of B hip strengthening for balance    Written Home Exercises Provided: yes. (bridges)  Exercises were reviewed and Regino was able to demonstrate them prior to the end of the session.  Regino demonstrated good  understanding of the education provided.     See EMR under Patient Instructions for exercises provided 3/11/2019.    Assessment   Regino is a 73 y.o. female referred to outpatient Physical Therapy with a medical diagnosis of lumbar disc herniation with radiculopathy. Pt presents with reports of low back pain, B LE weakness, decreased lumbar AROM, poor posture, decreased balance, muscle tightness, and impaired gait, endurance, and functional mobility. Special tests indicate decreased lumbar stability.     Pt prognosis is fair to good.  Pt will benefit from skilled outpatient Physical Therapy to address the deficits stated above and in the chart below, provide pt/family education, and to maximize pt's level of independence.     Plan of care discussed with patient: Yes  Pt's spiritual, cultural and educational needs considered and patient is agreeable to the plan of care and goals as stated below:     Anticipated Barriers for therapy: transportation    Medical Necessity is demonstrated by the following  History  Co-morbidities and personal factors that may impact the plan of care Co-morbidities:   advanced age, diabetes, HTN and prior spinal surgery, CKD, osteoporosis    Personal Factors:   lifestyle     high   Examination  Body Structures and Functions, activity limitations and participation restrictions  that may impact the plan of care Body Regions:   back  lower extremities  trunk    Body Systems:    gross symmetry  ROM  strength  gross coordinated movement  balance  gait  transfers  motor control  motor learning    Participation Restrictions:   ADLs, IADLs, domestic duties    Activity limitations:   Learning and applying knowledge  no deficits    General Tasks and Commands  no deficits    Communication  no deficits    Mobility  lifting and carrying objects  walking    Self care  washing oneself (bathing, drying, washing hands)    Domestic Life  shopping  cooking  doing house work (cleaning house, washing dishes, laundry)    Interactions/Relationships  no deficits    Life Areas  no deficits    Community and Social Life  recreation and leisure         high   Clinical Presentation stable and uncomplicated low   Decision Making/ Complexity Score: low     Medical necessity is demonstrated by the following IMPAIRMENTS/PROBLEM LIST:   1) Increase in pain level limiting function   2) LE weakness   3) Difficulty walking long distances   4) Hip flexor/quad tightness   5) Lack of HEP    GOALS: Short Term Goals:  6 weeks  1. Report decreased low back pain  <  / =  5/10 at worst to increase tolerance for prolonged standing.   2. Pt will be able to tolerate multi-directional LE strengthening in order to improve ability to perform household chores.  3. Pt will report 50% improvement in ability to walk long distances since start of care to indicate improved functional mobility.   4. Pt will increase B Ely's test to 100 deg to indicate improved hip flexor/quad flexibility and posture.  5. Pt to tolerate HEP to improve ROM and independence with ADL's.    Long Term Goals: 12 weeks  1. Report decreased low back pain  <  / =  3/10 at worst to increase tolerance for prolonged standing.   2. Pt will be able to perform 2 x 10 multi-directional LE strengthening without fatigue in order to improve ability to perform household chores.  3.  Pt will report 80% improvement in ability to walk long distances since start of care to indicate improved functional mobility.   4. Pt will increase B Ely's test to 110 deg to indicate improved hip flexor/quad flexibility and posture.  5. Pt to be Independent with HEP to improve ROM and independence with ADL's.    Plan   Plan of care Certification: 3/11/2019 to 6/11/19.    Outpatient Physical Therapy 2 times weekly for 12 weeks to include the following interventions: Cervical/Lumbar Traction, Gait Training, Manual Therapy, Moist Heat/ Ice, Neuromuscular Re-ed, Patient Education, Therapeutic Activites and Therapeutic Exercise.     Jessie Fox, PT

## 2019-03-12 ENCOUNTER — LAB VISIT (OUTPATIENT)
Dept: LAB | Facility: HOSPITAL | Age: 74
End: 2019-03-12
Attending: FAMILY MEDICINE
Payer: MEDICARE

## 2019-03-12 ENCOUNTER — OFFICE VISIT (OUTPATIENT)
Dept: FAMILY MEDICINE | Facility: CLINIC | Age: 74
End: 2019-03-12
Payer: MEDICARE

## 2019-03-12 ENCOUNTER — HOSPITAL ENCOUNTER (OUTPATIENT)
Dept: RADIOLOGY | Facility: HOSPITAL | Age: 74
Discharge: HOME | End: 2019-03-12
Attending: FAMILY MEDICINE
Payer: MEDICARE

## 2019-03-12 VITALS
HEIGHT: 60 IN | OXYGEN SATURATION: 99 % | BODY MASS INDEX: 25.06 KG/M2 | TEMPERATURE: 99 F | DIASTOLIC BLOOD PRESSURE: 80 MMHG | SYSTOLIC BLOOD PRESSURE: 158 MMHG | WEIGHT: 127.63 LBS | HEART RATE: 88 BPM | RESPIRATION RATE: 18 BRPM

## 2019-03-12 DIAGNOSIS — R06.09 DOE (DYSPNEA ON EXERTION): ICD-10-CM

## 2019-03-12 DIAGNOSIS — E11.3292 CONTROLLED TYPE 2 DIABETES MELLITUS WITH LEFT EYE AFFECTED BY MILD NONPROLIFERATIVE RETINOPATHY WITHOUT MACULAR EDEMA, WITHOUT LONG-TERM CURRENT USE OF INSULIN: Chronic | ICD-10-CM

## 2019-03-12 DIAGNOSIS — M51.36 DEGENERATIVE DISC DISEASE, LUMBAR: Primary | ICD-10-CM

## 2019-03-12 DIAGNOSIS — E11.8 CONTROLLED TYPE 2 DIABETES MELLITUS WITH COMPLICATION, WITHOUT LONG-TERM CURRENT USE OF INSULIN: Chronic | ICD-10-CM

## 2019-03-12 DIAGNOSIS — I70.0 CALCIFICATION OF AORTA: ICD-10-CM

## 2019-03-12 DIAGNOSIS — M70.62 TROCHANTERIC BURSITIS OF LEFT HIP: ICD-10-CM

## 2019-03-12 DIAGNOSIS — I10 ESSENTIAL HYPERTENSION: Chronic | ICD-10-CM

## 2019-03-12 LAB
CHOLEST SERPL-MCNC: 176 MG/DL
CHOLEST/HDLC SERPL: 3.2 {RATIO}
ESTIMATED AVG GLUCOSE: 128 MG/DL
HBA1C MFR BLD HPLC: 6.1 %
HDLC SERPL-MCNC: 55 MG/DL
HDLC SERPL: 31.3 %
LDLC SERPL CALC-MCNC: 90.4 MG/DL
NONHDLC SERPL-MCNC: 121 MG/DL
TRIGL SERPL-MCNC: 153 MG/DL

## 2019-03-12 PROCEDURE — 99499 UNLISTED E&M SERVICE: CPT | Mod: HCNC,S$GLB,, | Performed by: FAMILY MEDICINE

## 2019-03-12 PROCEDURE — 3079F DIAST BP 80-89 MM HG: CPT | Mod: HCNC,CPTII,S$GLB, | Performed by: FAMILY MEDICINE

## 2019-03-12 PROCEDURE — 99999 PR PBB SHADOW E&M-EST. PATIENT-LVL V: CPT | Mod: PBBFAC,HCNC,, | Performed by: FAMILY MEDICINE

## 2019-03-12 PROCEDURE — 3077F PR MOST RECENT SYSTOLIC BLOOD PRESSURE >= 140 MM HG: ICD-10-PCS | Mod: HCNC,CPTII,S$GLB, | Performed by: FAMILY MEDICINE

## 2019-03-12 PROCEDURE — 83036 HEMOGLOBIN GLYCOSYLATED A1C: CPT | Mod: HCNC

## 2019-03-12 PROCEDURE — 3079F PR MOST RECENT DIASTOLIC BLOOD PRESSURE 80-89 MM HG: ICD-10-PCS | Mod: HCNC,CPTII,S$GLB, | Performed by: FAMILY MEDICINE

## 2019-03-12 PROCEDURE — 99999 PR PBB SHADOW E&M-EST. PATIENT-LVL V: ICD-10-PCS | Mod: PBBFAC,HCNC,, | Performed by: FAMILY MEDICINE

## 2019-03-12 PROCEDURE — 99499 RISK ADDL DX/OHS AUDIT: ICD-10-PCS | Mod: HCNC,S$GLB,, | Performed by: FAMILY MEDICINE

## 2019-03-12 PROCEDURE — 1101F PT FALLS ASSESS-DOCD LE1/YR: CPT | Mod: HCNC,CPTII,S$GLB, | Performed by: FAMILY MEDICINE

## 2019-03-12 PROCEDURE — 71046 X-RAY EXAM CHEST 2 VIEWS: CPT | Mod: TC,HCNC,FY,PO

## 2019-03-12 PROCEDURE — 71046 XR CHEST PA AND LATERAL: ICD-10-PCS | Mod: 26,HCNC,, | Performed by: RADIOLOGY

## 2019-03-12 PROCEDURE — 99214 OFFICE O/P EST MOD 30 MIN: CPT | Mod: HCNC,S$GLB,, | Performed by: FAMILY MEDICINE

## 2019-03-12 PROCEDURE — 1101F PR PT FALLS ASSESS DOC 0-1 FALLS W/OUT INJ PAST YR: ICD-10-PCS | Mod: HCNC,CPTII,S$GLB, | Performed by: FAMILY MEDICINE

## 2019-03-12 PROCEDURE — 36415 COLL VENOUS BLD VENIPUNCTURE: CPT | Mod: HCNC,PO

## 2019-03-12 PROCEDURE — 3077F SYST BP >= 140 MM HG: CPT | Mod: HCNC,CPTII,S$GLB, | Performed by: FAMILY MEDICINE

## 2019-03-12 PROCEDURE — 99214 PR OFFICE/OUTPT VISIT, EST, LEVL IV, 30-39 MIN: ICD-10-PCS | Mod: HCNC,S$GLB,, | Performed by: FAMILY MEDICINE

## 2019-03-12 PROCEDURE — 80061 LIPID PANEL: CPT | Mod: HCNC

## 2019-03-12 PROCEDURE — 71046 X-RAY EXAM CHEST 2 VIEWS: CPT | Mod: 26,HCNC,, | Performed by: RADIOLOGY

## 2019-03-12 RX ORDER — NIFEDIPINE 30 MG/1
30 TABLET, EXTENDED RELEASE ORAL DAILY
Qty: 90 TABLET | Refills: 1 | Status: SHIPPED | OUTPATIENT
Start: 2019-03-12 | End: 2019-08-27 | Stop reason: SDUPTHER

## 2019-03-12 NOTE — PROGRESS NOTES
Chief Complaint   Patient presents with    Hypertension    Shortness of Breath       HPI  Regino Lawrence is a 73 y.o. female with multiple medical diagnoses as listed in the medical history and problem list that presents for follow-up for HTN and shortness of breath    This has gone on for three or four months. She has noticed weakness for the past three months, worse with exertion as she has weakness in her left hip and lower back. She is seeing pain management and pending an injection in her greater trochanter. She does report chest tightness but it is hard for her to tell if this is GERD related. She has a hx of sarcoid also but it had not progressed to her lungs.    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, without long-term current use of insulin     Depression     Diabetes mellitus, type 2     Eye injury as a child     k-abrasion  od    Hyperlipidemia     Hypertension     Hypothyroidism     Immune deficiency disorder     Immune disorder     Myalgia and myositis 2012    Osteoporosis     Polyneuropathy     Renal manifestation of secondary diabetes mellitus     Sarcoidosis     Ulcer     no cancer    Urinary incontinence        PAST SURGICAL HISTORY:  Past Surgical History:   Procedure Laterality Date    CARPAL TUNNEL RELEASE      Rt wrist    CATARACT EXTRACTION W/  INTRAOCULAR LENS IMPLANT Right 2015    Dr. Azevedo    CATARACT EXTRACTION W/  INTRAOCULAR LENS IMPLANT Left 2015    Dr. Azevedo     SECTION      CHOLECYSTECTOMY      COLPOCLEISIS-- lefort N/A 2016    [facility]     CYSTOSCOPY N/A 11/28/2016    [facility]    Injection,steroid,epidural,transforaminal approach    Bilateral L5 Bilateral 12/6/2018    [facility]fonso Richards [facility] at [facility] PAIN MGT    Injection,steroid,epidural,transforaminal approach  Left L5-S1 Left 10/8/2018    [facility] at [facility] PAIN MGT    INSERTION-INTRAOCULAR LENS (IOL) Left 5/21/2015    [facility]    INSERTION-INTRAOCULAR LENS (IOL) Right 4/30/2015    [facility]    LAMINECTOMY-CERVICAL/FUSION-POSTERIOR C3-C7 N/A 11/16/2015    [facility] OR 2ND FLR    PHACOEMULSIFICATION-ASPIRATION-CATARACT Left 5/21/2015    [facility] OR    PHACOEMULSIFICATION-ASPIRATION-CATARACT Right 4/30/2015    [facility] OR    REPAIR-POSTERIOR N/A 11/28/2016    [facility] at [facility] OR    SLING-TRANSOBTERATOR TAPE N/A 11/28/2016    [facility] at [facility] OR    TUBAL LIGATION         SOCIAL HISTORY:  Social History     Socioeconomic History    Marital status:      Spouse name: Not on file    Number of children: Not on file    Years of education: Not on file    Highest education level: Not on file   Social Needs    Financial resource strain: Not on file    Food insecurity - worry: Not on file    Food insecurity - inability: Not on file    Transportation needs - medical: Not on file    Transportation needs - non-medical: Not on file   Occupational History    Not on file   Tobacco Use    Smoking status: Never Smoker    Smokeless tobacco: Never Used   Substance and Sexual Activity    Alcohol use: No    Drug use: No    Sexual activity: Yes     Partners: Male   Other Topics Concern    Not on file   Social History Narrative    Not on file       FAMILY HISTORY:  Family History    Problem Relation Age of Onset    Hypertension Mother     Cataracts Mother     No Known Problems Father     Hypertension Maternal Grandmother     Glaucoma Sister     Arthritis Sister     No Known Problems Brother     No Known Problems Maternal Aunt     No Known Problems Maternal Uncle     No Known Problems Paternal Aunt     No Known Problems Paternal Uncle     No Known Problems Maternal Grandfather     No Known Problems Paternal Grandmother     No Known Problems Paternal Grandfather     Kidney failure Sister     Hepatitis Sister     Cancer Sister         bone cancer     Immunodeficiency Sister     Lupus Neg Hx     Rheum arthritis Neg Hx     Amblyopia Neg Hx     Blindness Neg Hx     Diabetes Neg Hx     Macular degeneration Neg Hx     Retinal detachment Neg Hx     Strabismus Neg Hx     Stroke Neg Hx     Thyroid disease Neg Hx     Endometrial cancer Neg Hx     Vaginal cancer Neg Hx     Cervical cancer Neg Hx        ALLERGIES AND MEDICATIONS: updated and reviewed.  Review of patient's allergies indicates:  No Known Allergies  Current Outpatient Medications   Medication Sig Dispense Refill    ACCU-CHEK FASTCLIX LANCING DEV Kit USE AS DIRECTED. 1 each 0    ACCU-CHEK SMARTVIEW TEST STRIP Strp TEST  ONCE  A   strip 11    ALCOHOL ANTISEPTIC PADS (ALCOHOL PREP PADS TOP)       blood-glucose meter kit Use as instructed 1 each 0    calcium carbonate (OS-MALCOLM) 600 mg calcium (1,500 mg) Tab Take 1 tablet by mouth 2 (two) times daily.       carvedilol (COREG) 25 MG tablet Take 1 tablet (25 mg total) by mouth 2 (two) times daily. 180 tablet 3    cloNIDine (CATAPRES) 0.3 MG tablet Take 1 tablet (0.3 mg total) by mouth 3 (three) times daily. 270 tablet 1    epoetin german (PROCRIT INJ) Inject 1,000 Units as directed.      fenofibrate 160 MG Tab Take 1 tablet (160 mg total) by mouth once daily. 90 tablet 1    fexofenadine (ALLEGRA ALLERGY) 180 MG tablet Take 180 mg by mouth daily as needed.        folic acid (FOLVITE) 1 MG tablet Take 1 tablet (1 mg total) by mouth once daily. 90 tablet 0    gabapentin (NEURONTIN) 400 MG capsule Take 1 capsule (400 mg total) by mouth 2 (two) times daily. 180 capsule 1    HYDROcodone-acetaminophen (NORCO) 5-325 mg per tablet Take 1 tablet by mouth every 6 (six) hours as needed. 90 tablet 0    levothyroxine (SYNTHROID) 50 MCG tablet TAKE 1 TABLET BY MOUTH ONCE DAILY BEFORE BREAKFAST 90 tablet 1    metFORMIN (GLUCOPHAGE) 1000 MG tablet Take 1 tablet (1,000 mg total) by mouth 2 (two) times daily with meals. 180 tablet 1    methotrexate 2.5 MG Tab TAKE 6 TABLETS BY MOUTH EVERY 7 DAYS 72 tablet 0    predniSONE (DELTASONE) 10 MG tablet       NIFEdipine (PROCARDIA-XL) 30 MG (OSM) 24 hr tablet Take 1 tablet (30 mg total) by mouth once daily. 90 tablet 1     No current facility-administered medications for this visit.        ROS  Review of Systems   Constitutional: Negative for chills, diaphoresis, fatigue, fever and unexpected weight change.   HENT: Negative for rhinorrhea, sinus pressure, sore throat and tinnitus.    Eyes: Negative for photophobia and visual disturbance.   Respiratory: Negative for cough, shortness of breath and wheezing.    Cardiovascular: Negative for chest pain and palpitations.   Gastrointestinal: Negative for abdominal pain, blood in stool, constipation, diarrhea, nausea and vomiting.   Genitourinary: Negative for dysuria, flank pain, frequency and vaginal discharge.   Musculoskeletal: Positive for arthralgias and gait problem. Negative for joint swelling.   Skin: Negative for rash.   Neurological: Positive for weakness. Negative for speech difficulty, light-headedness and headaches.   Psychiatric/Behavioral: Negative for behavioral problems and dysphoric mood.       Physical Exam  Vitals:    03/12/19 0917   BP: (!) 158/80   Pulse: 88   Resp: 18   Temp: 98.5 °F (36.9 °C)   TempSrc: Oral   SpO2: 99%   Weight: 57.9 kg (127 lb 10.3 oz)   Height: 5'  (1.524 m)    Body mass index is 24.93 kg/m².  Weight: 57.9 kg (127 lb 10.3 oz)   Height: 5' (152.4 cm)     Physical Exam   Constitutional: She is oriented to person, place, and time. She appears well-developed and well-nourished. No distress.   HENT:   Head: Normocephalic and atraumatic.   Eyes: EOM are normal.   Neck: Neck supple.   Cardiovascular: Normal rate and regular rhythm. Exam reveals no gallop and no friction rub.   No murmur heard.  Pulmonary/Chest: Effort normal and breath sounds normal. No respiratory distress. She has no wheezes. She has no rales.   Neurological: She is alert and oriented to person, place, and time.   Skin: Skin is warm and dry. No rash noted.   Psychiatric: She has a normal mood and affect. Her behavior is normal.   Nursing note and vitals reviewed.      Health Maintenance       Date Due Completion Date    Sign Pain Contract 10/29/1963 ---    Complete Opioid Risk Tool 10/29/1963 ---    Lipid Panel 05/08/2018 5/8/2017    Hemoglobin A1c 03/12/2019 9/12/2018    Foot Exam 06/19/2019 6/19/2018 (Done)    Override on 6/19/2018: Done    Override on 5/12/2017: Done    Mammogram 06/26/2019 6/26/2018    Eye Exam 08/06/2019 8/6/2018    Urine Microalbumin 09/12/2019 9/12/2018    DEXA SCAN 05/05/2020 5/5/2017    Colonoscopy 02/09/2025 2/9/2015 (Done)    Override on 2/9/2015: Done    Override on 2/18/2005: Done (reportedly normal)    TETANUS VACCINE 05/16/2026 5/16/2016            ASSESSMENT     1. Degenerative disc disease, lumbar    2. 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 Trochanteric bursitis of left hip    4. Essential hypertension    5. Calcification of aorta    6. Controlled type 2 diabetes mellitus with complication, without long-term current use of insulin    7. 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, without long-term current use of insulin (Chronic)  -check A1C       Cardiac/Vascular    Essential hypertension (Chronic)  -begin nifedipine 30mg for better BP control, she has been taking half amlodipine as she gets swelling and illness from amlodipine  -continue BP monitoring and f/u in one week    Relevant Medications    NIFEdipine (PROCARDIA-XL) 30 MG (OSM) 24 hr tablet    Calcification of aorta    Overview     "There is plaque in the aorta"  - Xray Chest 11-            Endocrine    Diabetes mellitus type 2, controlled (Chronic)  -check A1C, sugars have been running higher due to her pain levels    Relevant Orders    Lipid panel    Hemoglobin A1c      Other Visit Diagnoses     Trochanteric bursitis of left hip      -f/u with pain management    FAUSTIN (dyspnea on exertion)      -recommend cardiac consult for eval as she is having FAUSTIN  -could be triggered by her weakness requiring more exertion but need to r/o cardiac etiology    Relevant Orders    X-Ray Chest PA And Lateral    Ambulatory consult to Cardiology            Micaela Mendoza MD  03/12/2019 10:07 AM        Follow-up in about 1 week (around 3/19/2019) for Follow up.              "

## 2019-03-15 ENCOUNTER — PATIENT MESSAGE (OUTPATIENT)
Dept: FAMILY MEDICINE | Facility: CLINIC | Age: 74
End: 2019-03-15

## 2019-03-15 DIAGNOSIS — E11.69 COMBINED HYPERLIPIDEMIA ASSOCIATED WITH TYPE 2 DIABETES MELLITUS: Primary | Chronic | ICD-10-CM

## 2019-03-15 DIAGNOSIS — E78.2 COMBINED HYPERLIPIDEMIA ASSOCIATED WITH TYPE 2 DIABETES MELLITUS: Primary | Chronic | ICD-10-CM

## 2019-03-15 RX ORDER — ATORVASTATIN CALCIUM 20 MG/1
20 TABLET, FILM COATED ORAL DAILY
Qty: 90 TABLET | Refills: 3 | Status: SHIPPED | OUTPATIENT
Start: 2019-03-15 | End: 2020-04-24 | Stop reason: SDUPTHER

## 2019-03-19 ENCOUNTER — OFFICE VISIT (OUTPATIENT)
Dept: FAMILY MEDICINE | Facility: CLINIC | Age: 74
End: 2019-03-19
Payer: MEDICARE

## 2019-03-19 VITALS
DIASTOLIC BLOOD PRESSURE: 80 MMHG | HEIGHT: 60 IN | BODY MASS INDEX: 25.02 KG/M2 | RESPIRATION RATE: 18 BRPM | SYSTOLIC BLOOD PRESSURE: 132 MMHG | WEIGHT: 127.44 LBS | TEMPERATURE: 98 F | OXYGEN SATURATION: 97 % | HEART RATE: 75 BPM

## 2019-03-19 DIAGNOSIS — E11.8 CONTROLLED TYPE 2 DIABETES MELLITUS WITH COMPLICATION, WITHOUT LONG-TERM CURRENT USE OF INSULIN: Chronic | ICD-10-CM

## 2019-03-19 DIAGNOSIS — I10 ESSENTIAL HYPERTENSION: Primary | Chronic | ICD-10-CM

## 2019-03-19 PROCEDURE — 3075F SYST BP GE 130 - 139MM HG: CPT | Mod: HCNC,CPTII,S$GLB, | Performed by: FAMILY MEDICINE

## 2019-03-19 PROCEDURE — 3075F PR MOST RECENT SYSTOLIC BLOOD PRESS GE 130-139MM HG: ICD-10-PCS | Mod: HCNC,CPTII,S$GLB, | Performed by: FAMILY MEDICINE

## 2019-03-19 PROCEDURE — 99214 PR OFFICE/OUTPT VISIT, EST, LEVL IV, 30-39 MIN: ICD-10-PCS | Mod: HCNC,S$GLB,, | Performed by: FAMILY MEDICINE

## 2019-03-19 PROCEDURE — 99999 PR PBB SHADOW E&M-EST. PATIENT-LVL IV: CPT | Mod: PBBFAC,HCNC,, | Performed by: FAMILY MEDICINE

## 2019-03-19 PROCEDURE — 3044F HG A1C LEVEL LT 7.0%: CPT | Mod: HCNC,CPTII,S$GLB, | Performed by: FAMILY MEDICINE

## 2019-03-19 PROCEDURE — 3079F DIAST BP 80-89 MM HG: CPT | Mod: HCNC,CPTII,S$GLB, | Performed by: FAMILY MEDICINE

## 2019-03-19 PROCEDURE — 99999 PR PBB SHADOW E&M-EST. PATIENT-LVL IV: ICD-10-PCS | Mod: PBBFAC,HCNC,, | Performed by: FAMILY MEDICINE

## 2019-03-19 PROCEDURE — 99214 OFFICE O/P EST MOD 30 MIN: CPT | Mod: HCNC,S$GLB,, | Performed by: FAMILY MEDICINE

## 2019-03-19 PROCEDURE — 3044F PR MOST RECENT HEMOGLOBIN A1C LEVEL <7.0%: ICD-10-PCS | Mod: HCNC,CPTII,S$GLB, | Performed by: FAMILY MEDICINE

## 2019-03-19 PROCEDURE — 3079F PR MOST RECENT DIASTOLIC BLOOD PRESSURE 80-89 MM HG: ICD-10-PCS | Mod: HCNC,CPTII,S$GLB, | Performed by: FAMILY MEDICINE

## 2019-03-19 PROCEDURE — 1101F PT FALLS ASSESS-DOCD LE1/YR: CPT | Mod: HCNC,CPTII,S$GLB, | Performed by: FAMILY MEDICINE

## 2019-03-19 PROCEDURE — 1101F PR PT FALLS ASSESS DOC 0-1 FALLS W/OUT INJ PAST YR: ICD-10-PCS | Mod: HCNC,CPTII,S$GLB, | Performed by: FAMILY MEDICINE

## 2019-03-20 NOTE — PROGRESS NOTES
Chief Complaint   Patient presents with    Hypertension    Follow-up       HPI  Regino Lawrence is a 73 y.o. female with multiple medical diagnoses as listed in the medical history and problem list that presents for evaluation for hypertension    Since starting nifedipine here pressure has been controlled on home checks and has not risen even with her pain. 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, without long-term current use of insulin     Depression     Diabetes mellitus, type 2     Eye injury as a child     k-abrasion  od    Hyperlipidemia     Hypertension     Hypothyroidism     Immune deficiency disorder     Immune disorder     Myalgia and myositis 2012    Osteoporosis     Polyneuropathy     Renal manifestation of secondary diabetes mellitus     Sarcoidosis     Ulcer     no cancer    Urinary incontinence        PAST SURGICAL HISTORY:  Past Surgical History:   Procedure Laterality Date    CARPAL TUNNEL RELEASE      Rt wrist    CATARACT EXTRACTION W/  INTRAOCULAR LENS IMPLANT Right 2015    Dr. Azevedo    CATARACT EXTRACTION W/  INTRAOCULAR LENS IMPLANT Left 2015    Dr. Azevedo     SECTION      CHOLECYSTECTOMY      COLPOCLEISIS-- [place] N/A 2016    [facility] OR    CYSTOSCOPY N/A 2016    [facility] by [facility] at [facility] OR    Injection,steroid,epidural,transforaminal approach    Bilateral L5 Bilateral 2018    [facility] Richards MD at [facility] PAIN MGT     Injection,steroid,epidural,transforaminal approach  Left L5-S1 Left 10/8/2018    [facility] PAIN MGT    INSERTION-INTRAOCULAR LENS (IOL) Left 5/21/2015    [facility] OR    INSERTION-INTRAOCULAR LENS (IOL) Right 4/30/2015    [facility]    LAMINECTOMY-CERVICAL/FUSION-POSTERIOR C3-C7 N/A 11/16/2015    Performed by [facility] OR 2ND FLR    PHACOEMULSIFICATION-ASPIRATION-CATARACT Left 5/21/2015    [facility]    PHACOEMULSIFICATION-ASPIRATION-CATARACT Right 4/30/2015    [facility] OR    REPAIR-POSTERIOR N/A 11/28/2016    [facility] OR    SLING-TRANSOBTERATOR TAPE N/A 11/28/2016    [facility] OR    TUBAL LIGATION         SOCIAL HISTORY:  Social History     Socioeconomic History    Marital status:      Spouse name: Not on file    Number of children: Not on file    Years of education: Not on file    Highest education level: Not on file   Social Needs    Financial resource strain: Not on file    Food insecurity - worry: Not on file    Food insecurity - inability: Not on file    Transportation needs - medical: Not on file    Transportation needs - non-medical: Not on file   Occupational History    Not on file   Tobacco Use    Smoking status: Never Smoker    Smokeless tobacco: Never Used   Substance and Sexual Activity    Alcohol use: No    Drug use: No    Sexual activity: Yes     Partners: Male   Other Topics Concern    Not on file   Social History Narrative    Not on file       FAMILY HISTORY:  Family History   Problem Relation Age of Onset    Hypertension Mother     Cataracts Mother     No Known Problems Father     Hypertension Maternal Grandmother     Glaucoma Sister     Arthritis Sister     No Known Problems Brother     No  Known Problems Maternal Aunt     No Known Problems Maternal Uncle     No Known Problems Paternal Aunt     No Known Problems Paternal Uncle     No Known Problems Maternal Grandfather     No Known Problems Paternal Grandmother     No Known Problems Paternal Grandfather     Kidney failure Sister     Hepatitis Sister     Cancer Sister         bone cancer     Immunodeficiency Sister     Lupus Neg Hx     Rheum arthritis Neg Hx     Amblyopia Neg Hx     Blindness Neg Hx     Diabetes Neg Hx     Macular degeneration Neg Hx     Retinal detachment Neg Hx     Strabismus Neg Hx     Stroke Neg Hx     Thyroid disease Neg Hx     Endometrial cancer Neg Hx     Vaginal cancer Neg Hx     Cervical cancer Neg Hx        ALLERGIES AND MEDICATIONS: updated and reviewed.  Review of patient's allergies indicates:  No Known Allergies  Current Outpatient Medications   Medication Sig Dispense Refill    ACCU-CHEK FASTCLIX LANCING DEV Kit USE AS DIRECTED. 1 each 0    ACCU-CHEK SMARTVIEW TEST STRIP Strp TEST  ONCE  A   strip 11    ALCOHOL ANTISEPTIC PADS (ALCOHOL PREP PADS TOP)       blood-glucose meter kit Use as instructed 1 each 0    calcium carbonate (OS-MALCOLM) 600 mg calcium (1,500 mg) Tab Take 1 tablet by mouth 2 (two) times daily.       carvedilol (COREG) 25 MG tablet Take 1 tablet (25 mg total) by mouth 2 (two) times daily. 180 tablet 3    cloNIDine (CATAPRES) 0.3 MG tablet Take 1 tablet (0.3 mg total) by mouth 3 (three) times daily. 270 tablet 1    epoetin german (PROCRIT INJ) Inject 1,000 Units as directed.      fenofibrate 160 MG Tab Take 1 tablet (160 mg total) by mouth once daily. 90 tablet 1    fexofenadine (ALLEGRA ALLERGY) 180 MG tablet Take 180 mg by mouth daily as needed.       folic acid (FOLVITE) 1 MG tablet Take 1 tablet (1 mg total) by mouth once daily. 90 tablet 0    gabapentin (NEURONTIN) 400 MG capsule Take 1 capsule (400 mg total) by mouth 2 (two) times daily. 180 capsule 1     levothyroxine (SYNTHROID) 50 MCG tablet TAKE 1 TABLET BY MOUTH ONCE DAILY BEFORE BREAKFAST 90 tablet 1    metFORMIN (GLUCOPHAGE) 1000 MG tablet Take 1 tablet (1,000 mg total) by mouth 2 (two) times daily with meals. 180 tablet 1    methotrexate 2.5 MG Tab TAKE 6 TABLETS BY MOUTH EVERY 7 DAYS 72 tablet 0    NIFEdipine (PROCARDIA-XL) 30 MG (OSM) 24 hr tablet Take 1 tablet (30 mg total) by mouth once daily. 90 tablet 1    predniSONE (DELTASONE) 10 MG tablet       atorvastatin (LIPITOR) 20 MG tablet Take 1 tablet (20 mg total) by mouth once daily. 90 tablet 3     No current facility-administered medications for this visit.        ROS  Review of Systems   Constitutional: Positive for fatigue. Negative for chills, diaphoresis, fever and unexpected weight change.   HENT: Negative for rhinorrhea, sinus pressure, sore throat and tinnitus.    Eyes: Negative for photophobia and visual disturbance.   Respiratory: Negative for cough, shortness of breath and wheezing.    Cardiovascular: Negative for chest pain and palpitations.   Gastrointestinal: Negative for abdominal pain, blood in stool, constipation, diarrhea, nausea and vomiting.   Genitourinary: Negative for dysuria, flank pain, frequency and vaginal discharge.   Musculoskeletal: Positive for arthralgias. Negative for joint swelling.   Skin: Negative for rash.   Neurological: Positive for weakness. Negative for speech difficulty, light-headedness and headaches.   Psychiatric/Behavioral: Negative for behavioral problems and dysphoric mood.       Physical Exam  Vitals:    03/19/19 1246   BP: 132/80   Pulse: 75   Resp: 18   Temp: 98 °F (36.7 °C)   TempSrc: Oral   SpO2: 97%   Weight: 57.8 kg (127 lb 6.8 oz)   Height: 5' (1.524 m)    Body mass index is 24.89 kg/m².  Weight: 57.8 kg (127 lb 6.8 oz)   Height: 5' (152.4 cm)     Physical Exam   Constitutional: She is oriented to person, place, and time. She appears well-developed and well-nourished.   Eyes: EOM are normal.    Neurological: She is alert and oriented to person, place, and time.   Skin: Skin is warm and dry. No rash noted. No erythema.   Psychiatric: She has a normal mood and affect. Her behavior is normal.   Nursing note and vitals reviewed.      Health Maintenance       Date Due Completion Date    Sign Pain Contract 10/29/1963 ---    Complete Opioid Risk Tool 10/29/1963 ---    Foot Exam 06/19/2019 6/19/2018 (Done)    Override on 6/19/2018: Done    Override on 5/12/2017: Done    Mammogram 06/26/2019 6/26/2018    Eye Exam 08/06/2019 8/6/2018    Hemoglobin A1c 09/12/2019 3/12/2019    Urine Microalbumin 09/12/2019 9/12/2018    Lipid Panel 03/12/2020 3/12/2019    DEXA SCAN 05/05/2020 5/5/2017    Colonoscopy 02/09/2025 2/9/2015 (Done)    Override on 2/9/2015: Done    Override on 2/18/2005: Done (reportedly normal)    TETANUS VACCINE 05/16/2026 5/16/2016            ASSESSMENT     1. Essential hypertension    2. Controlled type 2 diabetes mellitus with complication, without long-term current use of insulin        PLAN:     Problem List Items Addressed This Visit        Cardiac/Vascular    Essential hypertension - Primary (Chronic)  -continue nifedipine at current dose  -BP controlled, OK for injection this week       Endocrine    Diabetes mellitus type 2, controlled (Chronic)  -stable, controlled, may elevated after steroid shot            Micaela Mendoza MD  03/19/2019 8:09 PM        Follow-up in about 3 months (around 6/19/2019) for Follow up.

## 2019-03-21 ENCOUNTER — HOSPITAL ENCOUNTER (OUTPATIENT)
Facility: OTHER | Age: 74
Discharge: HOME | End: 2019-03-21
Attending: ANESTHESIOLOGY | Admitting: ANESTHESIOLOGY
Payer: MEDICARE

## 2019-03-21 VITALS
OXYGEN SATURATION: 98 % | RESPIRATION RATE: 18 BRPM | HEART RATE: 83 BPM | BODY MASS INDEX: 24.94 KG/M2 | SYSTOLIC BLOOD PRESSURE: 163 MMHG | WEIGHT: 127 LBS | TEMPERATURE: 98 F | HEIGHT: 60 IN | DIASTOLIC BLOOD PRESSURE: 84 MMHG

## 2019-03-21 DIAGNOSIS — M16.12 PRIMARY OSTEOARTHRITIS OF LEFT HIP: Primary | ICD-10-CM

## 2019-03-21 DIAGNOSIS — M25.552 LEFT HIP PAIN: ICD-10-CM

## 2019-03-21 DIAGNOSIS — M70.62 GREATER TROCHANTERIC BURSITIS OF LEFT HIP: ICD-10-CM

## 2019-03-21 LAB — POCT GLUCOSE: 133 MG/DL (ref 70–110)

## 2019-03-21 PROCEDURE — 20610 PR DRAIN/INJECT LARGE JOINT/BURSA: ICD-10-PCS | Mod: HCNC,LT,, | Performed by: ANESTHESIOLOGY

## 2019-03-21 PROCEDURE — 77002 PR FLUOROSCOPIC GUIDANCE NEEDLE PLACEMENT: ICD-10-PCS | Mod: 26,HCNC,, | Performed by: ANESTHESIOLOGY

## 2019-03-21 PROCEDURE — 77002 NEEDLE LOCALIZATION BY XRAY: CPT | Mod: HCNC | Performed by: ANESTHESIOLOGY

## 2019-03-21 PROCEDURE — 20610 DRAIN/INJ JOINT/BURSA W/O US: CPT | Mod: HCNC,LT,, | Performed by: ANESTHESIOLOGY

## 2019-03-21 PROCEDURE — 25000003 PHARM REV CODE 250: Mod: HCNC | Performed by: ANESTHESIOLOGY

## 2019-03-21 PROCEDURE — 77002 NEEDLE LOCALIZATION BY XRAY: CPT | Mod: 26,HCNC,, | Performed by: ANESTHESIOLOGY

## 2019-03-21 PROCEDURE — 20610 DRAIN/INJ JOINT/BURSA W/O US: CPT | Mod: HCNC | Performed by: ANESTHESIOLOGY

## 2019-03-21 PROCEDURE — 25500020 PHARM REV CODE 255: Mod: HCNC | Performed by: ANESTHESIOLOGY

## 2019-03-21 PROCEDURE — 63600175 PHARM REV CODE 636 W HCPCS: Mod: HCNC | Performed by: ANESTHESIOLOGY

## 2019-03-21 PROCEDURE — 82947 ASSAY GLUCOSE BLOOD QUANT: CPT | Mod: HCNC | Performed by: ANESTHESIOLOGY

## 2019-03-21 RX ORDER — SODIUM CHLORIDE 9 MG/ML
500 INJECTION, SOLUTION INTRAVENOUS CONTINUOUS
Status: DISCONTINUED | OUTPATIENT
Start: 2019-03-21 | End: 2019-03-21 | Stop reason: HOSPADM

## 2019-03-21 RX ORDER — TRIAMCINOLONE ACETONIDE 40 MG/ML
INJECTION, SUSPENSION INTRA-ARTICULAR; INTRAMUSCULAR
Status: DISCONTINUED | OUTPATIENT
Start: 2019-03-21 | End: 2019-03-21 | Stop reason: HOSPADM

## 2019-03-21 RX ORDER — BUPIVACAINE HYDROCHLORIDE 2.5 MG/ML
INJECTION, SOLUTION EPIDURAL; INFILTRATION; INTRACAUDAL
Status: DISCONTINUED | OUTPATIENT
Start: 2019-03-21 | End: 2019-03-21 | Stop reason: HOSPADM

## 2019-03-21 RX ORDER — LIDOCAINE HYDROCHLORIDE 10 MG/ML
INJECTION INFILTRATION; PERINEURAL
Status: DISCONTINUED | OUTPATIENT
Start: 2019-03-21 | End: 2019-03-21 | Stop reason: HOSPADM

## 2019-03-21 NOTE — DISCHARGE INSTRUCTIONS
Thank you for allowing us to care for you today. You may receive a survey about the care we provided. Your feedback is valuable and helps us provide excellent care throughout the community.     Home Care Instructions for Pain Management:    1. DIET:   You may resume your normal diet today.   2. BATHING:   You may shower with luke warm water. No tub baths or anything that will soak injection sites under water for the next 24 hours.  3. DRESSING:   You may remove your bandage today.   4. ACTIVITY LEVEL:   You may resume your normal activities 24 hrs after your procedure. Nothing strenuous today.  5. MEDICATIONS:   You may resume your normal medications today. To restart blood thinners, ask your doctor.  6. DRIVING    If you have received any sedatives by mouth today, you may not drive for 12 hours.    If you have received any sedation through your IV, you may not drive for 24 hrs.   7. SPECIAL INSTRUCTIONS:   No heat to the injection site for 24 hrs including, hot bath or shower, heating pad, moist heat, or hot tubs.    Use ice pack to injection site for any pain or discomfort.  Apply ice packs for 20 minute intervals as needed.    IF you have diabetes, be sure to monitor your blood sugar more closely. IF your injection contained steroids your blood sugar levels may become higher than normal.    If you are still having pain upon discharge:  Your pain may improve over the next 48 hours. The anesthetic (numbing medication) works immediately to 48 hours. IF your injection contained a steroid (anti-inflammatory medication), it takes approximately 3 days to start feeling relief and 7-10 days to see your greatest results from the medication. It is possible you may need subsequent injections. This would be discussed at your follow up appointment with pain management or your referring doctor.      PLEASE CALL YOUR DOCTOR IF:  1. Redness or swelling around the injection site.  2. Fever of 101 degrees or more  3. Drainage  (pus) from the injection site.  4. For any continuous bleeding (some dried blood over the incision is normal.)    FOR EMERGENCIES:   If any unusual problems or difficulties occur during clinic hours, call [phone] or [phone].

## 2019-03-21 NOTE — DISCHARGE SUMMARY
Discharge Diagnosis:Left hip pain [M25.552]  Trochanteric bursitis of left hip [M70.62]  Condition on Discharge: Stable.  Diet on Discharge: Same as before.  Activity: as per instruction sheet.  Discharge to: Home with a responsible adult.  Follow up: 2-4 weeks &/or as per Discharge instructions

## 2019-03-21 NOTE — OP NOTE
Hip Injection/Arthrogram LATERAL APPROACH  Time-out taken to identify patient and procedure side prior to starting the procedure.   I attest that I have reviewed the patient's home medications prior to the procedure and no contraindication have been identified. I  re-evaluated the patient after the patient was positioned for the procedure in the procedure room immediately before the procedural time-out. The vital signs are current and represent the current state of the patient which has not significantly changed since the preprocedure assessment.                                                                                                            Date of Service: 03/21/2019    PCP: Micaela Mendoza MD    Referring Physician:                                                                                                PROCEDURE:  left hip joint and trochanteric bursa  injection under fluoroscopy.    REASON FOR PROCEDURE: left hip Left hip pain [M25.552]  Trochanteric bursitis of left hip [M70.62]    1. Primary osteoarthritis of left hip    2. Greater trochanteric bursitis of left hip    3. Left hip pain      POSTOP DIAGNOSIS: left hip Left hip pain [M25.552]  Trochanteric bursitis of left hip [M70.62]     1. Primary osteoarthritis of left hip    2. Greater trochanteric bursitis of left hip    3. Left hip pain        PHYSICIAN: Alfonso Richards MD  ASSISTANTS: Ji Beyer DO    Resident                                                                                           ANESTHESIA:  Xylocaine 1% 9ml with Sodium Bicarbonate 1ml. 3ml per site.                                                                                                              MEDICATIONS INJECTED:  Kenalog 20mg, bupivacaine 0.25% 2 mL (per side), and sterile saline 2ml (per side)                                                                                                                                     ESTIMATED BLOOD LOSS:   None.                                                                                                                              COMPLICATIONS:  None.        SEDATION: None                                                                                                                                 TECHNIQUE:  With the patient lying in the prone position the same side greater    trochanter was palpated.  The area was prepped and draped in the usual       sterile fashion.  Local anesthetic was used, given by raising a wheal and    going down to the hub of the 27-gauge needle.  A 3-1/2 inch 22-gauge         needle was introduced under fluoroscopy until the tip reached the greater    trochanter.  The tip of the needle was hinged cephalad from the greater        trochanter into the joint space.  When the tip of the needle was thought     to be in appropriate position, contrast was injected to confirm proper placement.  Medication was then injected slowly.  The patient tolerated the procedure well.                                                                                                                     PAIN BEFORE THE PROCEDURE: 5/10.                                                                                                                         PAIN AFTER THE PROCEDURE: 3/10.                                                                                                                          The patient was monitored for 20-30 minutes after the procedure and was      given post procedure and discharge instructions to follow at home.  The      patient is to follow-up with me in two weeks by calling.   The patient was discharged in a stable condtion.

## 2019-03-27 ENCOUNTER — DOCUMENTATION ONLY (OUTPATIENT)
Dept: REHABILITATION | Facility: HOSPITAL | Age: 74
End: 2019-03-27

## 2019-03-27 NOTE — PROGRESS NOTES
Physical Therapy: No show/Cancellation of Visit  Date: 03/27/2019        Patient cancelled today's PT appointment. Reason for cancellation: not feeling well. Patient's next scheduled appointment is 4/1.     Cancel: 1  No show: 0    Therapist: Luz Maria Cohn PTA

## 2019-03-28 ENCOUNTER — OFFICE VISIT (OUTPATIENT)
Dept: PODIATRY | Facility: CLINIC | Age: 74
End: 2019-03-28
Payer: MEDICARE

## 2019-03-28 VITALS
WEIGHT: 127 LBS | HEIGHT: 60 IN | DIASTOLIC BLOOD PRESSURE: 72 MMHG | SYSTOLIC BLOOD PRESSURE: 138 MMHG | BODY MASS INDEX: 24.94 KG/M2

## 2019-03-28 DIAGNOSIS — M79.672 FOOT PAIN, LEFT: Primary | ICD-10-CM

## 2019-03-28 DIAGNOSIS — M20.42 HAMMER TOES OF BOTH FEET: ICD-10-CM

## 2019-03-28 DIAGNOSIS — M20.41 HAMMER TOES OF BOTH FEET: ICD-10-CM

## 2019-03-28 DIAGNOSIS — E11.49 TYPE II DIABETES MELLITUS WITH NEUROLOGICAL MANIFESTATIONS: ICD-10-CM

## 2019-03-28 DIAGNOSIS — I10 ESSENTIAL HYPERTENSION: Chronic | ICD-10-CM

## 2019-03-28 DIAGNOSIS — M20.5X1 HALLUX LIMITUS, ACQUIRED, RIGHT: ICD-10-CM

## 2019-03-28 DIAGNOSIS — M20.5X2 HALLUX LIMITUS, ACQUIRED, LEFT: ICD-10-CM

## 2019-03-28 DIAGNOSIS — S93.402D INVERSION SPRAIN OF ANKLE, LEFT, SUBSEQUENT ENCOUNTER: ICD-10-CM

## 2019-03-28 PROCEDURE — 99214 OFFICE O/P EST MOD 30 MIN: CPT | Mod: HCNC,S$GLB,, | Performed by: PODIATRIST

## 2019-03-28 PROCEDURE — 3044F PR MOST RECENT HEMOGLOBIN A1C LEVEL <7.0%: ICD-10-PCS | Mod: HCNC,CPTII,S$GLB, | Performed by: PODIATRIST

## 2019-03-28 PROCEDURE — 3078F DIAST BP <80 MM HG: CPT | Mod: HCNC,CPTII,S$GLB, | Performed by: PODIATRIST

## 2019-03-28 PROCEDURE — 99999 PR PBB SHADOW E&M-EST. PATIENT-LVL III: ICD-10-PCS | Mod: PBBFAC,HCNC,, | Performed by: PODIATRIST

## 2019-03-28 PROCEDURE — 3044F HG A1C LEVEL LT 7.0%: CPT | Mod: HCNC,CPTII,S$GLB, | Performed by: PODIATRIST

## 2019-03-28 PROCEDURE — 1101F PT FALLS ASSESS-DOCD LE1/YR: CPT | Mod: HCNC,CPTII,S$GLB, | Performed by: PODIATRIST

## 2019-03-28 PROCEDURE — 3075F SYST BP GE 130 - 139MM HG: CPT | Mod: HCNC,CPTII,S$GLB, | Performed by: PODIATRIST

## 2019-03-28 PROCEDURE — 3078F PR MOST RECENT DIASTOLIC BLOOD PRESSURE < 80 MM HG: ICD-10-PCS | Mod: HCNC,CPTII,S$GLB, | Performed by: PODIATRIST

## 2019-03-28 PROCEDURE — 3075F PR MOST RECENT SYSTOLIC BLOOD PRESS GE 130-139MM HG: ICD-10-PCS | Mod: HCNC,CPTII,S$GLB, | Performed by: PODIATRIST

## 2019-03-28 PROCEDURE — 1101F PR PT FALLS ASSESS DOC 0-1 FALLS W/OUT INJ PAST YR: ICD-10-PCS | Mod: HCNC,CPTII,S$GLB, | Performed by: PODIATRIST

## 2019-03-28 PROCEDURE — 99999 PR PBB SHADOW E&M-EST. PATIENT-LVL III: CPT | Mod: PBBFAC,HCNC,, | Performed by: PODIATRIST

## 2019-03-28 PROCEDURE — 99214 PR OFFICE/OUTPT VISIT, EST, LEVL IV, 30-39 MIN: ICD-10-PCS | Mod: HCNC,S$GLB,, | Performed by: PODIATRIST

## 2019-03-28 RX ORDER — CLONIDINE HYDROCHLORIDE 0.3 MG/1
TABLET ORAL
Qty: 270 TABLET | Refills: 1 | Status: SHIPPED | OUTPATIENT
Start: 2019-03-28 | End: 2019-10-29 | Stop reason: SDUPTHER

## 2019-03-28 NOTE — PATIENT INSTRUCTIONS
Recommend lotions: eucerin, eucerin for diabetics, aquaphor, A&D ointment, gold bond for diabetics, sween, [place]'s Bees all purpose baby ointment,  urea 40 with aloe (found on amazon.com)    Shoe recommendations: (try 6pm.com, zappos.com , nordstromrack.Root4, or shoes.Root4 for discounted prices) you can visit [employer] in [place]  or [employer] [facility] in the [facility] (there are also several shoe brand outlets in the [facility])    Asics (GT 2000 or gel foundations), new balance stability type shoes, saucony (stabil c3),  [place] (GTS or Beast or transcend), propet (tennis shoe)    Sofchasity Hernandez (women) Robert&Juan (men), clarks, crocs, aerosoles, naturalizers, SAS, ecco, born, zoey emmanuel, rockports (dress shoes)    Vionic, burkenstocks, fitflops, propet (sandals)  Nike comfort thong sandals, crocs, propet (house shoes)    Nail Home remedy:  Vicks Vapor rub to nails for easier manageability      Diabetes: Inspecting Your Feet  Diabetes increases your chances of developing foot problems. So inspect your feet every day. This helps you find small skin irritations before they become serious infections. If you have trouble seeing the bottoms of your feet, use a mirror or ask a family member or friend to help.     Pressure spots on the bottom of the foot are common areas where problems develop.   How to check your feet  Below are tips to help you look for foot problems. Try to check your feet at the same time each day, such as when you get out of bed in the morning:  · Check the top of each foot. The tops of toes, back of the heel, and outer edge of the foot can get a lot of rubbing from poor-fitting shoes.  · Check the bottom of each foot. Daily wear and tear often leads to problems at pressure spots.  · Check the toes and nails. Fungal infections often occur between toes. Toenail problems can also be a sign of fungal infections or lead to breaks in the skin.  · Check your shoes, too. Loose objects inside a shoe can injure the  foot. Use your hand to feel inside your shoes for things like bob, loose stitching, or rough areas that could irritate your skin.  Warning signs  Look for any color changes in the foot. Redness with streaks can signal a severe infection, which needs immediate medical attention. Tell your doctor right away if you have any of these problems:  · Swelling, sometimes with color changes, may be a sign of poor blood flow or infection. Symptoms include tenderness and an increase in the size of your foot.  · Warm or hot areas on your feet may be signs of infection. A foot that is cold may not be getting enough blood.  · Sensations such as burning, tingling, or pins and needles can be signs of a problem. Also check for areas that may be numb.  · Hot spots are caused by friction or pressure. Look for hot spots in areas that get a lot of rubbing. Hot spots can turn into blisters, calluses, or sores.  · Cracks and sores are caused by dry or irritated skin. They are a sign that the skin is breaking down, which can lead to infection.  · Toenail problems to watch for include nails growing into the skin (ingrown toenail) and causing redness or pain. Thick, yellow, or discolored nails can signal a fungal infection.  · Drainage and odor can develop from untreated sores and ulcers. Call your doctor right away if you notice white or yellow drainage, bleeding, or unpleasant odor.   © 4305-9649 [employer]. [address] [place] [address]. All rights reserved. This information is not intended as a substitute for professional medical care. Always follow your healthcare professional's instructions.        Step-by-Step:  Inspecting Your Feet (Diabetes)    Date Last Reviewed: 10/1/2016  © 7397-3075 [employer]. [address] [place] [address]. All rights reserved. This information is not intended as a substitute for professional medical care. Always follow your healthcare professional's  instructions.            Strain, Sprain, or Contusion  Strains, sprains, and contusions are common injuries. These injuries are similar, but involve different types of body tissue. Most of these injuries happen during sports or active play. But they can occur at any time. A strain, sprain, or contusion can be painful. With the right treatment, most heal with no lasting problems.              What is a strain?  A strain is an injury to a muscle or to a tendon (tissue that connects muscle to bone). It is sometimes called a pulled muscle. A strain happens when a muscle or tendon is stretched too far or is partially torn. Symptoms of a strain are pain, swelling, and having a problem moving or using the injured area. The hamstring (thigh muscle), calf muscle, and Achilles tendon are commonly strained.   What is a sprain?  A sprain is an injury to a ligament (tissue that connects bones to other bones). Joints contain many ligaments. A sprain happens when a joint is twisted or pulled and the ligament stretches or tears. Symptoms of a sprain are pain, swelling, and having a problem moving or using the injured area. Ankles, knees, and wrists are the joints most commonly sprained.   What is a contusion?  A contusion is commonly called a bruise. It is injury to tissue that causes bleeding without breaking the skin. It is often a result of being hit by a blunt object such as a ball or bat. Symptoms of a contusion are discoloration of the skin, pain (which can be severe), and swelling. Contusions usually arent serious and dont need medical attention. But a large, painful, or very swollen bruise, or a bruise that limits movement of a joint such as the knee should be seen by a doctor.   How are strains, sprains, and contusions diagnosed?  An examination is also done. An X-ray (test that creates images of bones) may be done to rule out broken bones.  How are strains, sprains, and contusions treated?  · Strains and sprains can  take up to months to heal. If not treated and allowed to heal, a strain or sprain can lead to long-term problems. These include lasting pain and stiffness. So it is important to follow the doctors instructions.  · The pain of a contusion often resolves within the first week. But the swelling and discoloration may take weeks to go away.  Treatment consists of one or more of the following:  · RICE (which stands for Rest, Ice, Compression, and Elevation)  ¨ Rest. As much as possible, you should not use the injured area.  ¨ Ice. Put ice on the injured area 3-4 times a day for 20 minutes at a time. Use an ice pack or bag of frozen peas wrapped in a thin towel.   ¨ Compression. If instructed, wrap the area to keep swelling down. Use an elastic bandage. .  ¨ Elevation.  Raise the injured body part above the level of your heart.  · Medications to relieve inflammation and pain. These will likely be NSAIDs (non-steroidal anti-inflammatory drugs). NSAIDs include ibuprofen and naproxen.   · Physical therapy (PT) to strengthen the injured area. This is especially helpful for moderate to severe strains or sprains.  · Casting of the affected area to keep it still and allow the strain or sprain to heal.  · Surgery may be needed if the strain or sprain is severe and there is tearing. During surgery, the torn muscle, tendon, or ligament is repaired.  What are the long-term concerns?  If allowed to heal, most strains, sprains, and contusions cause no further problems. Strains or sprains that are not treated and dont heal properly can lead to pain or stiffness that doesnt go away. Be sure to follow your childs treatment plan. Your childs doctor can tell you more about the expected outcome based on your childs injury.     Preventing strains, sprains, and contusions  If playing sports or doing other athletic activity, be sure you:  · Has proper training.  · Wears protective gear.  · Warms up before activity and cools down  afterward.  · Uses proper equipment.   © 9601-6851 [employer]. [address] [place] [address], [place] [address]. All rights reserved. This information is not intended as a substitute for professional medical care. Always follow your healthcare professional's instructions.

## 2019-03-28 NOTE — PROGRESS NOTES
Subjective:      Patient ID: Regino Lawrence is a 73 y.o. female.    Chief Complaint: Foot Problem (left foot pain (PCP Dr Mendoza 3/19/19))    Regino is a 73 y.o. female who presents to the clinic for evaluation and treatment of high risk feet. 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 The patient's chief complaint is  left foot midpain. Description: severe Nature: aching, sharp and throbbing Location: midfoot and ankles Onset of the symptoms was several months ago. Precipitating event: hip pain.  History of injury: no Current symptoms include: worsening symptoms after a period of activity. Aggravating factors: any weight bearing. Alleviating factors: rest Symptoms have gradually worsened. Patient has had prior foot problems. Evaluation to date: seen by neurology, neurosurgery, pain management. Treatment to date: steroid injections to the hip, rx shoes. Patients rates pain 10/10 on pain scale.   This patient has documented high risk feet requiring routine maintenance secondary to diabetes mellitis and those secondary complications of diabetes, as mentioned..    03/28/19 She has been wearing compression socks and tennis shoes and relates complete improvement to the midfoot and minimal to no improvement to the ankle.    PCP: Micaela Mendoza MD    Date Last Seen by PCP:   Chief Complaint   Patient presents with    Foot Problem     left foot pain (PCP  Dr Mendoza 3/19/19)     Current shoe gear:  Tennis shoes     Hemoglobin A1C   Date Value Ref Range Status   03/12/2019 6.1 (H) 4.0 - 5.6 % Final     Comment:     ADA Screening Guidelines:  5.7-6.4%  Consistent with prediabetes  >or=6.5%  Consistent with diabetes  High levels of fetal hemoglobin interfere with the HbA1C  assay. Heterozygous hemoglobin variants (HbS, HgC, etc)do  not significantly interfere with this assay.   However, presence of multiple variants may affect accuracy.     01/22/2018 6.0 (H) 4.0 - 5.6 % Final     Comment:     According to ADA guidelines, hemoglobin A1c <7.0% represents  optimal control in non-pregnant diabetic patients. Different  metrics may apply to specific patient populations.   Standards of Medical Care in Diabetes-2016.  For the purpose of screening for the presence of diabetes:  <5.7%     Consistent with the absence of diabetes  5.7-6.4%  Consistent with increasing risk for diabetes   (prediabetes)  >or=6.5%  Consistent with diabetes  Currently, no consensus exists for use of hemoglobin A1c  for diagnosis of diabetes for children.  This Hemoglobin A1c assay has significant interference with fetal   hemoglobin   (HbF). The results are invalid for patients with abnormal amounts of   HbF,   including those with known Hereditary Persistence   of Fetal Hemoglobin. Heterozygous hemoglobin variants (HbAS, HbAC,   HbAD, HbAE, HbA2) do not significantly interfere with this assay;   however, presence of multiple variants in a sample may impact the %   interference.     06/30/2017 5.9 (H) 4.0 - 5.6 % Final     Comment:     According to ADA guidelines, hemoglobin A1c <7.0% represents  optimal control in non-pregnant diabetic patients. Different  metrics may apply to specific patient populations.   Standards of Medical Care in Diabetes-2016.  For the purpose of screening for the presence of diabetes:  <5.7%     Consistent with the absence of diabetes  5.7-6.4%  Consistent with increasing risk  for diabetes   (prediabetes)  >or=6.5%  Consistent with diabetes  Currently, no consensus exists for use of hemoglobin A1c  for diagnosis of diabetes for children.  This Hemoglobin A1c assay has significant interference with fetal   hemoglobin   (HbF). The results are invalid for patients with abnormal amounts of   HbF,   including those with known Hereditary Persistence   of Fetal Hemoglobin. Heterozygous hemoglobin variants (HbAS, HbAC,   HbAD, HbAE, HbA2) do not significantly interfere with this assay;   however, presence of multiple variants in a sample may impact the %   interference.       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, without long-term current use of insulin    Sarcoidosis    Corneal scar, right eye    Nuclear sclerosis    Senile nuclear sclerosis    Immunosuppression    Vitamin B12 deficiency anemia    Left shoulder pain    Cervical spinal stenosis    Patient is Zoroastrian    GERD (gastroesophageal reflux disease)    Debility    S/P cervical spinal fusion    Chronic bilateral low back pain with left-sided sciatica    Degenerative disc disease, lumbar    Poor motor control of trunk    Impaired mobility    Muscle weakness    Iron deficiency anemia    Anemia in CKD (chronic kidney disease)    Refusal of blood transfusions as patient is Zoroastrian    Current use of steroid medication    DM type 2 without retinopathy    Pseudophakia    Insufficiency of tear film of both eyes    Refractive error    Myopathy    Osteopenia     Calcification of aorta    Dry eye syndrome, bilateral    Neck pain    Lumbar disc herniation with radiculopathy    Acute left-sided low back pain without sciatica    Antalgic gait    Lumbar radiculopathy    Lumbar stenosis with neurogenic claudication    Chronic bilateral low back pain without sciatica    Anemia in chronic kidney disease    Greater trochanteric bursitis of left hip    Muscle weakness of lower extremity    Poor posture    Impaired functional mobility, balance, gait, and endurance    Left hip pain     Current Outpatient Medications on File Prior to Visit   Medication Sig Dispense Refill    ACCU-CHEK FASTCLIX LANCING DEV Kit USE AS DIRECTED. 1 each 0    ACCU-CHEK SMARTVIEW TEST STRIP Strp TEST  ONCE  A   strip 11    ALCOHOL ANTISEPTIC PADS (ALCOHOL PREP PADS TOP)       atorvastatin (LIPITOR) 20 MG tablet Take 1 tablet (20 mg total) by mouth once daily. 90 tablet 3    blood-glucose meter kit Use as instructed 1 each 0    carvedilol (COREG) 25 MG tablet Take 1 tablet (25 mg total) by mouth 2 (two) times daily. 180 tablet 3    epoetin german (PROCRIT INJ) Inject 1,000 Units as directed.      fenofibrate 160 MG Tab Take 1 tablet (160 mg total) by mouth once daily. 90 tablet 1    levothyroxine (SYNTHROID) 50 MCG tablet TAKE 1 TABLET BY MOUTH ONCE DAILY BEFORE BREAKFAST 90 tablet 1    metFORMIN (GLUCOPHAGE) 1000 MG tablet Take 1 tablet (1,000 mg total) by mouth 2 (two) times daily with meals. 180 tablet 1    methotrexate 2.5 MG Tab TAKE 6 TABLETS BY MOUTH EVERY 7 DAYS 72 tablet 0    NIFEdipine (PROCARDIA-XL) 30 MG (OSM) 24 hr tablet Take 1 tablet (30 mg total) by mouth once daily. 90 tablet 1    predniSONE (DELTASONE) 10 MG tablet       calcium carbonate (OS-MALCOLM) 600 mg calcium (1,500 mg) Tab Take 1 tablet by mouth 2 (two) times daily.       folic acid (FOLVITE) 1 MG tablet Take 1 tablet (1 mg total) by mouth once daily. 90 tablet 0    gabapentin (NEURONTIN) 400 MG capsule  Take 1 capsule (400 mg total) by mouth 2 (two) times daily. 180 capsule 1     No current facility-administered medications on file prior to visit.      Review of patient's allergies indicates:   Allergen Reactions    Azathioprine Shortness Of Breath and Other (See Comments)     Fatigue     Past Surgical History:   Procedure Laterality Date    CARPAL TUNNEL RELEASE      Rt wrist    CATARACT EXTRACTION W/  INTRAOCULAR LENS IMPLANT Right 2015    Dr. Azevedo    CATARACT EXTRACTION W/  INTRAOCULAR LENS IMPLANT Left 2015    Dr. Azevedo     SECTION      CHOLECYSTECTOMY      COLPOCLEISIS-- lefort N/A 2016    Performed by Meredith Becker DO at [facility] [place]    CYSTOSCOPY N/A 2016    Performed by Meredith Becker DO at [facility] [place]    Injection, Joint  fLUOROSCOPIC jOINT iNJECTION (hIP iNJECTION) LEFT ROCH BURSA AS WELL LEFT TROCHANTERIC BURSA Left 3/21/2019    Performed by Alfonso Richards MD at [facility] [place] [facility]GT    Injection,steroid,epidural,transforaminal approach    Bilateral L5 Bilateral 2018    Performed by Alfonso Richards MD at [facility] [place] [facility]    Injection,steroid,epidural,transforaminal approach  Left L5-S1 Left 10/8/2018    Performed by Alfonso Richards MD at [facility] [place] [facility]    INSERTION-INTRAOCULAR LENS (IOL) Left 2015    Performed by [facility] at [facility] OR    INSERTION-INTRAOCULAR LENS (IOL) Right 2015    Performed by [facility]edo MD at [facility] [place]    LAMINECTOMY-CERVICAL/FUSION-POSTERIOR C3-C7 N/A 2015    Performed by Fab Conner MD at [facility] 2ND FLR    PHACOEMULSIFICATION-ASPIRATION-CATARACT Left 2015    Performed by [facility]edo MD at [facility] [place]    PHACOEMULSIFICATION-ASPIRATION-CATARACT Right 2015    Performed by [facility]io [facility]edo MD at [facility] [place]    REPAIR-POSTERIOR N/A 2016    Performed by Meredith Becker DO at [facility] [place]    SLING-TRANSOBTERATOR TAPE N/A 2016    Performed by Meredith  Rosita, [facility] at [facility]    TUBAL LIGATION       Family History   Problem Relation Age of Onset    Hypertension Mother     Cataracts Mother     No Known Problems Father     Hypertension Maternal Grandmother     Glaucoma Sister     Arthritis Sister     No Known Problems Brother     No Known Problems Maternal Aunt     No Known Problems Maternal Uncle     No Known Problems Paternal Aunt     No Known Problems Paternal Uncle     No Known Problems Maternal Grandfather     No Known Problems Paternal Grandmother     No Known Problems Paternal Grandfather     Kidney failure Sister     Hepatitis Sister     Cancer Sister         bone cancer     Immunodeficiency Sister     Lupus Neg Hx     Rheum arthritis Neg Hx     Amblyopia Neg Hx     Blindness Neg Hx     Diabetes Neg Hx     Macular degeneration Neg Hx     Retinal detachment Neg Hx     Strabismus Neg Hx     Stroke Neg Hx     Thyroid disease Neg Hx     Endometrial cancer Neg Hx     Vaginal cancer Neg Hx     Cervical cancer Neg Hx      Social History     Socioeconomic History    Marital status:      Spouse name: Not on file    Number of children: Not on file    Years of education: Not on file    Highest education level: Not on file   Occupational History    Not on file   Social Needs    Financial resource strain: Not on file    Food insecurity:     Worry: Not on file     Inability: Not on file    Transportation needs:     Medical: Not on file     Non-medical: Not on file   Tobacco Use    Smoking status: Never Smoker    Smokeless tobacco: Never Used   Substance and Sexual Activity    Alcohol use: No    Drug use: No    Sexual activity: Yes     Partners: Male   Lifestyle    Physical activity:     Days per week: Not on file     Minutes per session: Not on file    Stress: Not on file   Relationships    Social connections:     Talks on phone: Not on file     Gets together: Not on file     Attends Oriental orthodox service: Not on file      Active member of club or organization: Not on file     Attends meetings of clubs or organizations: Not on file     Relationship status: Not on file    Intimate partner violence:     Fear of current or ex partner: Not on file     Emotionally abused: Not on file     Physically abused: Not on file     Forced sexual activity: Not on file   Other Topics Concern    Not on file   Social History Narrative    Not on file       Review of Systems   Constitution: Negative for chills and fever.        She feels tired and weak   Cardiovascular: Negative for chest pain, claudication and leg swelling.   Respiratory: Negative for cough and shortness of breath.    Skin: Positive for dry skin and nail changes. Negative for itching and rash.   Musculoskeletal: Positive for arthritis, back pain, joint pain, muscle weakness, myalgias and neck pain. Negative for falls and joint swelling.   Gastrointestinal: Positive for nausea. Negative for diarrhea and vomiting.   Neurological: Positive for loss of balance, numbness, paresthesias, sensory change and weakness. Negative for tremors.   Psychiatric/Behavioral: Negative for altered mental status and hallucinations.           Objective:       Vitals:    03/28/19 1125   BP: 138/72   Weight: 57.6 kg (127 lb)   Height: 5' (1.524 m)   PainSc:   5       Physical Exam   Constitutional:   General: Pt. is well-developed, well-nourished, appears stated age, in no acute distress, alert and oriented x 3. No evidence of depression, anxiety, or agitation. Calm, cooperative, and communicative. Appropriate interactions and affect.       Cardiovascular:   Pulses:       Dorsalis pedis pulses are 1+ on the right side, and 1+ on the left side.        Posterior tibial pulses are 1+ on the right side, and 1+ on the left side.   There is decreased digital hair. Skin is atrophic, hyperpigmented, and mildly edematous       Musculoskeletal:        Right ankle: She exhibits swelling. No tenderness. Achilles  tendon exhibits no pain, no defect and normal Naik's test results.        Left ankle: She exhibits swelling. Tenderness. AITFL and CF ligament tenderness found. Achilles tendon exhibits no pain and no defect.        Right foot: There is decreased range of motion and tenderness.        Left foot: There is decreased range of motion, tenderness (midfoot) and crepitus (midfoot).   5/5 muscle strength Bilaterally. There is some limitation of dorsiflexion with knees extended Bilaterally, adequate with knees flexed.     Decreased stride, station of gait.  apropulsive toe off.  Increased angle and base of gait.    Patient has hammertoes of digits 2-5 bilateral partially reducible without symptom today.    Tailor bunion present 5th mtpj right with medial deviation of 5th toe, prominent bony bump lateral 5th mtpj, and pain to palpation without evidence of trauma or infection.   Neurological: A sensory deficit is present.   Yukon-Ingrid 5.07 monofilamant testing is diminished Ted feet. Sharp/dull sensation diminished Bilaterally.   Skin: Skin is warm, dry and intact. No abrasion, no ecchymosis, no lesion and no rash noted. She is not diaphoretic. No cyanosis or erythema. No pallor. Nails show no clubbing.   Toenails 1-5 bilaterally are thickened by 2-3 mm, discolored/yellowed, dystrophic, brittle with subungual debris.    Interdigital Spaces clean, dry and without evidence of break in skin integrity   Psychiatric: She has a normal mood and affect. Her speech is normal.   Nursing note and vitals reviewed.        Assessment:       Encounter Diagnoses   Name Primary?    Foot pain, left Yes    Inversion sprain of ankle, left, subsequent encounter     Hammer toes of both feet     Hallux limitus, acquired, left     Hallux limitus, acquired, right     Type II diabetes mellitus with neurological manifestations          Plan:       Regino was seen today for foot problem.    Diagnoses and all orders for this  visit:    Foot pain, left  -     IMMOBILIZER FOR HOME USE    Inversion sprain of ankle, left, subsequent encounter  -     DIABETIC SHOES FOR HOME USE  -     IMMOBILIZER FOR HOME USE    Hammer toes of both feet  -     DIABETIC SHOES FOR HOME USE    Hallux limitus, acquired, left  -     DIABETIC SHOES FOR HOME USE    Hallux limitus, acquired, right  -     DIABETIC SHOES FOR HOME USE    Type II diabetes mellitus with neurological manifestations  -     DIABETIC SHOES FOR HOME USE      I counseled the patient on her conditions, their implications and medical management.    Greater than 50% of this visit spent on counseling and coordination of care.    Patient education on arthralgias of the foot. Discussed non-surgical treatment options, including injection, supportive shoegear, inserts.     Discussed ankle sprains and importance of immobilization for healing as well as the lengthy course of treatment for complete resolution.    Tubigrip to LLE; patient is to elevate legs. When sleeping, place a pillow under lower extremities. When sitting, support the legs so that they are level with the waist.    Rx diabetic shoes for protection and support    Patient instructed to rest affected limb, avoid excessive WB and use crutch or walker assistance if needed.    Rx ankle brace    Instructions on elevation to reduce pain and swelling. When sleeping, place a pillow under the injured leg. When sitting, support the injured leg so it is level with your waist.  Patient instructed on adequate icing techniques. Patient should ice the affected area at least once per day x 10 minutes for 10 days . I advised the  patient that extra icing would also be beneficial to ensure adequate anti inflammatory effect     RTC PRN

## 2019-03-28 NOTE — PROGRESS NOTES
Physical Therapy Daily Treatment Note     Name: Regino Lawrence  Clinic Number: [id number]    Therapy Diagnosis:   Encounter Diagnoses   Name Primary?    Muscle weakness of lower extremity     Poor posture     Impaired functional mobility, balance, gait, and endurance      Physician: Fab Conner MD    Visit Date: 4/1/2019    Physician Orders: PT Eval and Treat - fall prevention  Medical Diagnosis from Referral: Lumbar disc herniation with radiculopathy  Evaluation Date: 3/11/2019  Authorization Period Expiration: 12/31/19  Plan of Care Expiration: 6/11/19  Visit # / Visits authorized: 2/ 20      Time In: 1105  Time Out: 1200  Total Billable Time: 30 minutes    Precautions: Diabetes, Fall and osteoporosis, B foot neuropathy, uncontrolled BP, CKD       Subjective     Pt reports: that she is having a lot of health problems right now.  Pt states that she received an injection on the left hip and her low back is feeling a lot better.  Pt states that she gets out of breath very easily so she isn't sure how much she can do.  Pt states that she has an appointment with the cardiologist today.   She was not compliant with home exercise program.  Response to previous treatment: sore   Functional change: none to report    Pain: 10/10  Location: everywhere    Objective       SpO2 monitored throughout session 98% at start , 98% mid session     Regino received therapeutic exercises to develop strength, endurance, ROM, flexibility, posture and core stabilization for 45 minutes including:    Nustep x 6'   Seated Heel/Toe Raises x 20   Seated Marches x 2'   Seated hip abduction 2' x Yellow TB  Seated hip adduction 2' x 3'' hold   Seated ball roll outs x 2'   Seated LAQ x 10 ea LE         Regino participated in dynamic functional therapeutic activities to improve functional performance for 0  minutes, including:      Regino participated in gait training to improve functional mobility and safety for 0  minutes,  including:      Regino received hot pack for 10 minutes to low back seated         Home Exercises Provided and Patient Education Provided     Education provided:   - compliance with HEP.    Written Home Exercises Provided: Patient instructed to cont prior HEP.  Exercises were reviewed and Regino was able to demonstrate them prior to the end of the session.  Regino demonstrated good  understanding of the education provided.     See EMR under Patient Instructions for exercises provided this visit.     Assessment     Patient tolerated treatment session well today.  Patient with great response to exercises with appropriate training effect achieved.  Patient with decreased pain and improved mobility post treatment session.    Regino is  progressing well towards her goals.   Pt prognosis is Good.     Pt will continue to benefit from skilled outpatient physical therapy to address the deficits listed in the problem list box on initial evaluation, provide pt/family education and to maximize pt's level of independence in the home and community environment.     Pt's spiritual, cultural and educational needs considered and pt agreeable to plan of care and goals.     Anticipated barriers to physical therapy: pain limited and  CHF     GOALS: Short Term Goals:  6 weeks  1. Report decreased low back pain  <  / =  5/10 at worst to increase tolerance for prolonged standing.   2. Pt will be able to tolerate multi-directional LE strengthening in order to improve ability to perform household chores.  3. Pt will report 50% improvement in ability to walk long distances since start of care to indicate improved functional mobility.   4. Pt will increase B Ely's test to 100 deg to indicate improved hip flexor/quad flexibility and posture.  5. Pt to tolerate HEP to improve ROM and independence with ADL's.     Long Term Goals: 12 weeks  1. Report decreased low back pain  <  / =  3/10 at worst to increase tolerance for prolonged standing.    2. Pt will be able to perform 2 x 10 multi-directional LE strengthening without fatigue in order to improve ability to perform household chores.  3. Pt will report 80% improvement in ability to walk long distances since start of care to indicate improved functional mobility.   4. Pt will increase B Ely's test to 110 deg to indicate improved hip flexor/quad flexibility and posture.  5. Pt to be Independent with HEP to improve ROM and independence with ADL's.      Plan     Continue with current POC.     Luz Maria Cohn, PTA

## 2019-04-01 ENCOUNTER — CLINICAL SUPPORT (OUTPATIENT)
Dept: REHABILITATION | Facility: HOSPITAL | Age: 74
End: 2019-04-01
Attending: NEUROLOGICAL SURGERY
Payer: MEDICARE

## 2019-04-01 DIAGNOSIS — Z74.09 IMPAIRED FUNCTIONAL MOBILITY, BALANCE, GAIT, AND ENDURANCE: ICD-10-CM

## 2019-04-01 DIAGNOSIS — M62.81 MUSCLE WEAKNESS OF LOWER EXTREMITY: ICD-10-CM

## 2019-04-01 DIAGNOSIS — R29.3 POOR POSTURE: ICD-10-CM

## 2019-04-01 PROCEDURE — 97110 THERAPEUTIC EXERCISES: CPT | Mod: HCNC,PN

## 2019-04-03 ENCOUNTER — OFFICE VISIT (OUTPATIENT)
Dept: CARDIOLOGY | Facility: CLINIC | Age: 74
End: 2019-04-03
Payer: MEDICARE

## 2019-04-03 VITALS
OXYGEN SATURATION: 95 % | SYSTOLIC BLOOD PRESSURE: 148 MMHG | WEIGHT: 130 LBS | DIASTOLIC BLOOD PRESSURE: 76 MMHG | HEART RATE: 104 BPM | BODY MASS INDEX: 25.52 KG/M2 | HEIGHT: 60 IN

## 2019-04-03 DIAGNOSIS — R06.02 SOB (SHORTNESS OF BREATH): ICD-10-CM

## 2019-04-03 DIAGNOSIS — I10 ESSENTIAL HYPERTENSION: Chronic | ICD-10-CM

## 2019-04-03 DIAGNOSIS — I70.0 CALCIFICATION OF AORTA: ICD-10-CM

## 2019-04-03 DIAGNOSIS — E11.3292 CONTROLLED TYPE 2 DIABETES MELLITUS WITH LEFT EYE AFFECTED BY MILD NONPROLIFERATIVE RETINOPATHY WITHOUT MACULAR EDEMA, WITHOUT LONG-TERM CURRENT USE OF INSULIN: Primary | Chronic | ICD-10-CM

## 2019-04-03 DIAGNOSIS — E11.8 CONTROLLED TYPE 2 DIABETES MELLITUS WITH COMPLICATION, WITHOUT LONG-TERM CURRENT USE OF INSULIN: Chronic | ICD-10-CM

## 2019-04-03 DIAGNOSIS — R07.89 CHEST PAIN, ATYPICAL: ICD-10-CM

## 2019-04-03 DIAGNOSIS — E78.2 COMBINED HYPERLIPIDEMIA ASSOCIATED WITH TYPE 2 DIABETES MELLITUS: Chronic | ICD-10-CM

## 2019-04-03 DIAGNOSIS — R06.09 DOE (DYSPNEA ON EXERTION): ICD-10-CM

## 2019-04-03 DIAGNOSIS — D86.9 SARCOIDOSIS: Chronic | ICD-10-CM

## 2019-04-03 DIAGNOSIS — E11.69 COMBINED HYPERLIPIDEMIA ASSOCIATED WITH TYPE 2 DIABETES MELLITUS: Chronic | ICD-10-CM

## 2019-04-03 PROCEDURE — 3077F SYST BP >= 140 MM HG: CPT | Mod: HCNC,CPTII,S$GLB, | Performed by: INTERNAL MEDICINE

## 2019-04-03 PROCEDURE — 99214 OFFICE O/P EST MOD 30 MIN: CPT | Mod: HCNC,S$GLB,, | Performed by: INTERNAL MEDICINE

## 2019-04-03 PROCEDURE — 99214 PR OFFICE/OUTPT VISIT, EST, LEVL IV, 30-39 MIN: ICD-10-PCS | Mod: HCNC,S$GLB,, | Performed by: INTERNAL MEDICINE

## 2019-04-03 PROCEDURE — 1101F PT FALLS ASSESS-DOCD LE1/YR: CPT | Mod: HCNC,CPTII,S$GLB, | Performed by: INTERNAL MEDICINE

## 2019-04-03 PROCEDURE — 3077F PR MOST RECENT SYSTOLIC BLOOD PRESSURE >= 140 MM HG: ICD-10-PCS | Mod: HCNC,CPTII,S$GLB, | Performed by: INTERNAL MEDICINE

## 2019-04-03 PROCEDURE — 3044F PR MOST RECENT HEMOGLOBIN A1C LEVEL <7.0%: ICD-10-PCS | Mod: HCNC,CPTII,S$GLB, | Performed by: INTERNAL MEDICINE

## 2019-04-03 PROCEDURE — 3044F HG A1C LEVEL LT 7.0%: CPT | Mod: HCNC,CPTII,S$GLB, | Performed by: INTERNAL MEDICINE

## 2019-04-03 PROCEDURE — 93000 ELECTROCARDIOGRAM COMPLETE: CPT | Mod: HCNC,S$GLB,, | Performed by: INTERNAL MEDICINE

## 2019-04-03 PROCEDURE — 3078F PR MOST RECENT DIASTOLIC BLOOD PRESSURE < 80 MM HG: ICD-10-PCS | Mod: HCNC,CPTII,S$GLB, | Performed by: INTERNAL MEDICINE

## 2019-04-03 PROCEDURE — 3078F DIAST BP <80 MM HG: CPT | Mod: HCNC,CPTII,S$GLB, | Performed by: INTERNAL MEDICINE

## 2019-04-03 PROCEDURE — 99999 PR PBB SHADOW E&M-EST. PATIENT-LVL IV: ICD-10-PCS | Mod: PBBFAC,HCNC,, | Performed by: INTERNAL MEDICINE

## 2019-04-03 PROCEDURE — 1101F PR PT FALLS ASSESS DOC 0-1 FALLS W/OUT INJ PAST YR: ICD-10-PCS | Mod: HCNC,CPTII,S$GLB, | Performed by: INTERNAL MEDICINE

## 2019-04-03 PROCEDURE — 99999 PR PBB SHADOW E&M-EST. PATIENT-LVL IV: CPT | Mod: PBBFAC,HCNC,, | Performed by: INTERNAL MEDICINE

## 2019-04-03 PROCEDURE — 93000 EKG 12-LEAD: ICD-10-PCS | Mod: HCNC,S$GLB,, | Performed by: INTERNAL MEDICINE

## 2019-04-03 NOTE — PROGRESS NOTES
Subjective:    Patient ID:  Regino Lawrence is a 73 y.o. female who presents for follow-up of Shortness of Breath      HPI     Previously saw Dr Holden  HPI: She is schedule for gynecologic surgery on Monday. As part of her preoperative evaluation, an ECG was done which she was told was abnormal. I do not have the ECG to review. She has no cardiac history. Regino Lawrence denies any chest pain, shortness of breath, PND, orthopnea, palpitations, or syncope. Her ECG today is normal. All ECG's reviewed in [facility] were normal. She walks with a cane due to arthritis.    1. Controlled type 2 diabetes mellitus without complication, without long-term current use of insulin    2. Preop cardiovascular exam : She has 0 clinical risk factors according to the RCRI placing her at low risk for a perioperative cardiac complication. She has no symptoms suggestive of cardiopulmonary disease, and her ECG is normal. No further cardiac testing is required prior to her surgery. She should continue on her clonidine during the perioperative period to avoid rebound hypertension.   3. Essential hypertension      Reports worsening FAUSTIN for several months - sometimes associated with chest tightness  EKG sinus tachycardia 104 otherwise ok  Denies prior CAD  Walks with a walker          Review of Systems   Constitution: Negative for decreased appetite.   HENT: Negative for ear discharge.    Eyes: Negative for blurred vision.   Respiratory: Negative for hemoptysis.    Endocrine: Negative for polyphagia.   Hematologic/Lymphatic: Negative for adenopathy.   Skin: Negative for color change.   Musculoskeletal: Negative for joint swelling.   Genitourinary: Negative for bladder incontinence.   Neurological: Negative for brief paralysis.   Psychiatric/Behavioral: Negative for hallucinations.   Allergic/Immunologic: Negative for hives.        Objective:    Physical Exam   Constitutional: She is oriented to person, place, and time. She appears well-developed  and well-nourished.   HENT:   Head: Normocephalic and atraumatic.   Eyes: Pupils are equal, round, and reactive to light. Conjunctivae are normal.   Neck: Normal range of motion. Neck supple.   Cardiovascular: Normal rate, normal heart sounds and intact distal pulses.   Pulmonary/Chest: Effort normal and breath sounds normal.   Abdominal: Soft. Bowel sounds are normal.   Musculoskeletal: Normal range of motion.   Neurological: She is alert and oriented to person, place, and time.   Skin: Skin is warm and dry.         Assessment:       1. 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 Combined 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    3. Essential hypertension    4. Calcification of aorta    5. Sarcoidosis    6. Controlled type 2 diabetes mellitus with complication, without long-term current use of insulin    7. FAUSTIN (dyspnea on exertion)    8. Chest pain, atypical         Plan:       Echo and lexiscan myoview for CP and SOB

## 2019-04-05 ENCOUNTER — INFUSION (OUTPATIENT)
Dept: INFUSION THERAPY | Facility: HOSPITAL | Age: 74
End: 2019-04-05
Attending: INTERNAL MEDICINE
Payer: MEDICARE

## 2019-04-05 VITALS
OXYGEN SATURATION: 99 % | DIASTOLIC BLOOD PRESSURE: 82 MMHG | TEMPERATURE: 98 F | SYSTOLIC BLOOD PRESSURE: 160 MMHG | WEIGHT: 130.06 LBS | RESPIRATION RATE: 16 BRPM | HEART RATE: 76 BPM | BODY MASS INDEX: 25.4 KG/M2

## 2019-04-05 DIAGNOSIS — D63.1 ANEMIA IN CHRONIC KIDNEY DISEASE, UNSPECIFIED CKD STAGE: Primary | ICD-10-CM

## 2019-04-05 DIAGNOSIS — E78.2 COMBINED HYPERLIPIDEMIA ASSOCIATED WITH TYPE 2 DIABETES MELLITUS: Chronic | ICD-10-CM

## 2019-04-05 DIAGNOSIS — E11.69 COMBINED HYPERLIPIDEMIA ASSOCIATED WITH TYPE 2 DIABETES MELLITUS: Chronic | ICD-10-CM

## 2019-04-05 DIAGNOSIS — N18.9 ANEMIA IN CHRONIC KIDNEY DISEASE, UNSPECIFIED CKD STAGE: Primary | ICD-10-CM

## 2019-04-05 DIAGNOSIS — Z53.1 REFUSAL OF BLOOD TRANSFUSIONS AS PATIENT IS JEHOVAH'S WITNESS: ICD-10-CM

## 2019-04-05 PROCEDURE — 96372 THER/PROPH/DIAG INJ SC/IM: CPT | Mod: HCNC

## 2019-04-05 PROCEDURE — 63600175 PHARM REV CODE 636 W HCPCS: Mod: HCNC,JG | Performed by: INTERNAL MEDICINE

## 2019-04-05 RX ADMIN — ERYTHROPOIETIN 20000 UNITS: 20000 INJECTION, SOLUTION INTRAVENOUS; SUBCUTANEOUS at 11:04

## 2019-04-05 NOTE — PLAN OF CARE
Problem: Adult Inpatient Plan of Care  Goal: Plan of Care Review  Outcome: Ongoing (interventions implemented as appropriate)  Pt presented for Procrit injection. VSS, although slightly hypertensive. No dizziness/headaches, etc reported. Pt reported weakness/fatigue and SOB. Pt tolerated injection well. Ambulatory into and out of unit with walker. Accompanied by daughter. Distress screening tool completed. Future appt information reviewed with pt.

## 2019-04-07 RX ORDER — FENOFIBRATE 160 MG/1
160 TABLET ORAL DAILY
Qty: 90 TABLET | Refills: 0 | Status: SHIPPED | OUTPATIENT
Start: 2019-04-07 | End: 2019-08-27 | Stop reason: SDUPTHER

## 2019-04-08 ENCOUNTER — CLINICAL SUPPORT (OUTPATIENT)
Dept: REHABILITATION | Facility: HOSPITAL | Age: 74
End: 2019-04-08
Attending: NEUROLOGICAL SURGERY
Payer: MEDICARE

## 2019-04-08 DIAGNOSIS — Z74.09 IMPAIRED FUNCTIONAL MOBILITY, BALANCE, GAIT, AND ENDURANCE: ICD-10-CM

## 2019-04-08 DIAGNOSIS — M62.81 MUSCLE WEAKNESS OF LOWER EXTREMITY: ICD-10-CM

## 2019-04-08 DIAGNOSIS — R29.3 POOR POSTURE: ICD-10-CM

## 2019-04-08 PROCEDURE — 97110 THERAPEUTIC EXERCISES: CPT | Mod: HCNC,PN

## 2019-04-08 NOTE — PROGRESS NOTES
Physical Therapy Daily Treatment Note     Name: Regino Lawrence  Clinic Number: [id number]    Therapy Diagnosis:   Encounter Diagnoses   Name Primary?    Muscle weakness of lower extremity     Poor posture     Impaired functional mobility, balance, gait, and endurance      Physician: Fab Conner MD    Visit Date: 4/8/2019    Physician Orders: PT Eval and Treat - fall prevention  Medical Diagnosis from Referral: Lumbar disc herniation with radiculopathy  Evaluation Date: 3/11/2019  Authorization Period Expiration: 12/31/19  Plan of Care Expiration: 6/11/19  Visit # / Visits authorized: 3/ 20      Time In: 1105  Time Out: 1200  Total Billable Time: 30 minutes    Precautions: Diabetes, Fall and osteoporosis, B foot neuropathy, uncontrolled BP, CKD       Subjective     Pt states the hip and back continues to be sore.   She was not compliant with home exercise program.  Response to previous treatment: sore   Functional change: none to report    Pain:8/10  Location: everywhere    Objective       Regino received therapeutic exercises to develop strength, endurance, ROM, flexibility, posture and core stabilization for 55 minutes including:    Nustep x 6'   Seated Heel/Toe Raises x 20   Seated Marches x 2'   Seated hip abduction 2' x Yellow TB  Seated hip adduction 2' x 3''   Seated ball roll outs x 2'   Seated LAQ x 10 ea LE   -standing hip abd 2 x 10  -standing marching 2 x 10  -standing heel raises 2 x 10        Regino participated in dynamic functional therapeutic activities to improve functional performance for 0  minutes, including:      Regino participated in gait training to improve functional mobility and safety for 0  minutes, including:      Regino received hot pack for 10 minutes to low back seated         Home Exercises Provided and Patient Education Provided     Education provided:   - compliance with HEP.    Written Home Exercises Provided: Patient instructed to cont prior HEP.  Exercises were  reviewed and Regino was able to demonstrate them prior to the end of the session.  Regino demonstrated good  understanding of the education provided.     See EMR under Patient Instructions for exercises provided this visit.     Assessment     No c/o increased discomfort with prescribed activities. Poor tolerance to re-implementation of standing therex requiring multiple rest breaks.  Decreased symptoms reported post treatment.   Regino is  progressing well towards her goals.     Pt prognosis is Good.     Pt will continue to benefit from skilled outpatient physical therapy to address the deficits listed in the problem list box on initial evaluation, provide pt/family education and to maximize pt's level of independence in the home and community environment.     Pt's spiritual, cultural and educational needs considered and pt agreeable to plan of care and goals.     Anticipated barriers to physical therapy: pain limited and  CHF     GOALS: Short Term Goals:  6 weeks  1. Report decreased low back pain  <  / =  5/10 at worst to increase tolerance for prolonged standing.   2. Pt will be able to tolerate multi-directional LE strengthening in order to improve ability to perform household chores.  3. Pt will report 50% improvement in ability to walk long distances since start of care to indicate improved functional mobility.   4. Pt will increase B Ely's test to 100 deg to indicate improved hip flexor/quad flexibility and posture.  5. Pt to tolerate HEP to improve ROM and independence with ADL's.     Long Term Goals: 12 weeks  1. Report decreased low back pain  <  / =  3/10 at worst to increase tolerance for prolonged standing.   2. Pt will be able to perform 2 x 10 multi-directional LE strengthening without fatigue in order to improve ability to perform household chores.  3. Pt will report 80% improvement in ability to walk long distances since start of care to indicate improved functional mobility.   4. Pt will  increase B Ely's test to 110 deg to indicate improved hip flexor/quad flexibility and posture.  5. Pt to be Independent with HEP to improve ROM and independence with ADL's.      Plan     Continue with current POC.     Kaushik French, PT

## 2019-04-09 ENCOUNTER — TELEPHONE (OUTPATIENT)
Dept: PODIATRY | Facility: CLINIC | Age: 74
End: 2019-04-09

## 2019-04-09 NOTE — TELEPHONE ENCOUNTER
----- Message from Deanna Aviles sent at 4/9/2019  1:54 PM CDT -----  Contact: Kelli   Name of Who is Calling:Kelli     What is the request in detail: Kelli with [employer] would like a call back regarding what type of  ankle brace provider wants for the patient Please contact to further discuss and advise    Can the clinic reply by [facility]: No      [facility] Back [facility]: [phone]

## 2019-04-11 ENCOUNTER — HOSPITAL ENCOUNTER (OUTPATIENT)
Dept: CARDIOLOGY | Facility: HOSPITAL | Age: 74
Discharge: HOME | End: 2019-04-11
Attending: INTERNAL MEDICINE
Payer: MEDICARE

## 2019-04-11 ENCOUNTER — HOSPITAL ENCOUNTER (OUTPATIENT)
Dept: RADIOLOGY | Facility: HOSPITAL | Age: 74
Discharge: HOME | End: 2019-04-11
Attending: INTERNAL MEDICINE
Payer: MEDICARE

## 2019-04-11 ENCOUNTER — PATIENT MESSAGE (OUTPATIENT)
Dept: FAMILY MEDICINE | Facility: CLINIC | Age: 74
End: 2019-04-11

## 2019-04-11 VITALS — HEIGHT: 60 IN | WEIGHT: 130 LBS | BODY MASS INDEX: 25.52 KG/M2

## 2019-04-11 DIAGNOSIS — E11.69 COMBINED HYPERLIPIDEMIA ASSOCIATED WITH TYPE 2 DIABETES MELLITUS: ICD-10-CM

## 2019-04-11 DIAGNOSIS — E11.8 CONTROLLED TYPE 2 DIABETES MELLITUS WITH COMPLICATION, WITHOUT LONG-TERM CURRENT USE OF INSULIN: ICD-10-CM

## 2019-04-11 DIAGNOSIS — D86.9 SARCOIDOSIS: ICD-10-CM

## 2019-04-11 DIAGNOSIS — R06.09 DOE (DYSPNEA ON EXERTION): ICD-10-CM

## 2019-04-11 DIAGNOSIS — E11.3292 CONTROLLED TYPE 2 DIABETES MELLITUS WITH LEFT EYE AFFECTED BY MILD NONPROLIFERATIVE RETINOPATHY WITHOUT MACULAR EDEMA, WITHOUT LONG-TERM CURRENT USE OF INSULIN: ICD-10-CM

## 2019-04-11 DIAGNOSIS — I70.0 CALCIFICATION OF AORTA: ICD-10-CM

## 2019-04-11 DIAGNOSIS — I10 ESSENTIAL HYPERTENSION: ICD-10-CM

## 2019-04-11 DIAGNOSIS — R07.89 CHEST PAIN, ATYPICAL: ICD-10-CM

## 2019-04-11 DIAGNOSIS — E78.2 COMBINED HYPERLIPIDEMIA ASSOCIATED WITH TYPE 2 DIABETES MELLITUS: ICD-10-CM

## 2019-04-11 DIAGNOSIS — M51.36 DEGENERATIVE DISC DISEASE, LUMBAR: Primary | ICD-10-CM

## 2019-04-11 LAB
AORTIC ROOT ANNULUS: 2.87 CM
AORTIC VALVE CUSP SEPERATION: 1.67 CM
ASCENDING AORTA: 2.76 CM
AV INDEX (PROSTH): 0.91
AV MEAN GRADIENT: 3.19 MMHG
AV PEAK GRADIENT: 5.38 MMHG
AV VALVE AREA: 2.74 CM2
AV VELOCITY RATIO: 0.94
BSA FOR ECHO PROCEDURE: 1.58 M2
CV ECHO LV RWT: 0.63 CM
CV STRESS BASE HR: 77 BPM
DIASTOLIC BLOOD PRESSURE: 93 MMHG
DOP CALC AO PEAK VEL: 1.16 M/S
DOP CALC AO VTI: 27.92 CM
DOP CALC LVOT AREA: 3.02 CM2
DOP CALC LVOT DIAMETER: 1.96 CM
DOP CALC LVOT PEAK VEL: 1.09 M/S
DOP CALC LVOT STROKE VOLUME: 76.54 CM3
DOP CALCLVOT PEAK VEL VTI: 25.38 CM
E WAVE DECELERATION TIME: 180.21 MSEC
E/A RATIO: 0.8
E/E' RATIO: 14.55
ECHO LV POSTERIOR WALL: 1.24 CM (ref 0.6–1.1)
FRACTIONAL SHORTENING: 45 % (ref 28–44)
INTERVENTRICULAR SEPTUM: 1.21 CM (ref 0.6–1.1)
IVRT: 0.07 MSEC
LA MAJOR: 4.47 CM
LA MINOR: 5.16 CM
LA WIDTH: 4.1 CM
LEFT ATRIUM SIZE: 3.1 CM
LEFT ATRIUM VOLUME INDEX: 33.3 ML/M2
LEFT ATRIUM VOLUME: 51.75 CM3
LEFT INTERNAL DIMENSION IN SYSTOLE: 2.16 CM (ref 2.1–4)
LEFT VENTRICLE DIASTOLIC VOLUME INDEX: 42.58 ML/M2
LEFT VENTRICLE DIASTOLIC VOLUME: 66.18 ML
LEFT VENTRICLE MASS INDEX: 106.1 G/M2
LEFT VENTRICLE SYSTOLIC VOLUME INDEX: 10 ML/M2
LEFT VENTRICLE SYSTOLIC VOLUME: 15.49 ML
LEFT VENTRICULAR INTERNAL DIMENSION IN DIASTOLE: 3.91 CM (ref 3.5–6)
LEFT VENTRICULAR MASS: 164.91 G
LV LATERAL E/E' RATIO: 16
LV SEPTAL E/E' RATIO: 13.33
MV PEAK A VEL: 1 M/S
MV PEAK E VEL: 0.8 M/S
NUC STRESS EJECTION FRACTION: 62 %
OHS CV CPX 85 PERCENT MAX PREDICTED HEART RATE MALE: 120
OHS CV CPX MAX PREDICTED HEART RATE: 142
OHS CV CPX PATIENT IS FEMALE: 1
OHS CV CPX PATIENT IS MALE: 0
OHS CV CPX PEAK DIASTOLIC BLOOD PRESSURE: 73 MMHG
OHS CV CPX PEAK HEAR RATE: 105 BPM
OHS CV CPX PEAK RATE PRESSURE PRODUCT: NORMAL
OHS CV CPX PEAK SYSTOLIC BLOOD PRESSURE: 134 MMHG
OHS CV CPX PERCENT MAX PREDICTED HEART RATE ACHIEVED: 74
OHS CV CPX RATE PRESSURE PRODUCT PRESENTING: NORMAL
PISA TR MAX VEL: 2.89 M/S
PV PEAK VELOCITY: 0.86 CM/S
RA MAJOR: 4.81 CM
RA PRESSURE: 3 MMHG
RA WIDTH: 2.88 CM
RIGHT VENTRICULAR END-DIASTOLIC DIMENSION: 2.65 CM
RV TISSUE DOPPLER FREE WALL SYSTOLIC VELOCITY 1 (APICAL 4 CHAMBER VIEW): 9.11 M/S
SINUS: 2.79 CM
STJ: 1.95 CM
SYSTOLIC BLOOD PRESSURE: 155 MMHG
TDI LATERAL: 0.05
TDI SEPTAL: 0.06
TDI: 0.06
TR MAX PG: 33.41 MMHG
TRICUSPID ANNULAR PLANE SYSTOLIC EXCURSION: 2.12 CM
TV REST PULMONARY ARTERY PRESSURE: 36 MMHG

## 2019-04-11 PROCEDURE — 93016 STRESS TEST WITH MYOCARDIAL PERFUSION (CUPID ONLY): ICD-10-PCS | Mod: HCNC,,, | Performed by: INTERNAL MEDICINE

## 2019-04-11 PROCEDURE — 78452 HT MUSCLE IMAGE SPECT MULT: CPT | Mod: 26,HCNC,, | Performed by: INTERNAL MEDICINE

## 2019-04-11 PROCEDURE — 63600175 PHARM REV CODE 636 W HCPCS: Mod: HCNC | Performed by: INTERNAL MEDICINE

## 2019-04-11 PROCEDURE — A9502 TC99M TETROFOSMIN: HCPCS | Mod: HCNC

## 2019-04-11 PROCEDURE — 93017 CV STRESS TEST TRACING ONLY: CPT | Mod: HCNC

## 2019-04-11 PROCEDURE — 93306 TTE W/DOPPLER COMPLETE: CPT | Mod: 26,HCNC,, | Performed by: INTERNAL MEDICINE

## 2019-04-11 PROCEDURE — 78452 STRESS TEST WITH MYOCARDIAL PERFUSION (CUPID ONLY): ICD-10-PCS | Mod: 26,HCNC,, | Performed by: INTERNAL MEDICINE

## 2019-04-11 PROCEDURE — 93018 CV STRESS TEST I&R ONLY: CPT | Mod: HCNC,,, | Performed by: INTERNAL MEDICINE

## 2019-04-11 PROCEDURE — 93306 TTE W/DOPPLER COMPLETE: CPT | Mod: HCNC

## 2019-04-11 PROCEDURE — 93018 STRESS TEST WITH MYOCARDIAL PERFUSION (CUPID ONLY): ICD-10-PCS | Mod: HCNC,,, | Performed by: INTERNAL MEDICINE

## 2019-04-11 PROCEDURE — 93306 TRANSTHORACIC ECHO (TTE) COMPLETE (CUPID ONLY): ICD-10-PCS | Mod: 26,HCNC,, | Performed by: INTERNAL MEDICINE

## 2019-04-11 PROCEDURE — 93016 CV STRESS TEST SUPVJ ONLY: CPT | Mod: HCNC,,, | Performed by: INTERNAL MEDICINE

## 2019-04-11 RX ORDER — REGADENOSON 0.08 MG/ML
0.4 INJECTION, SOLUTION INTRAVENOUS ONCE
Status: COMPLETED | OUTPATIENT
Start: 2019-04-11 | End: 2019-04-11

## 2019-04-11 RX ADMIN — REGADENOSON 0.4 MG: 0.08 INJECTION, SOLUTION INTRAVENOUS at 09:04

## 2019-04-12 ENCOUNTER — LAB VISIT (OUTPATIENT)
Dept: LAB | Facility: HOSPITAL | Age: 74
End: 2019-04-12
Attending: INTERNAL MEDICINE
Payer: MEDICARE

## 2019-04-12 DIAGNOSIS — N18.9 ANEMIA IN CHRONIC KIDNEY DISEASE, UNSPECIFIED CKD STAGE: ICD-10-CM

## 2019-04-12 DIAGNOSIS — D50.9 IRON DEFICIENCY ANEMIA, UNSPECIFIED IRON DEFICIENCY ANEMIA TYPE: ICD-10-CM

## 2019-04-12 DIAGNOSIS — D63.1 ANEMIA IN CHRONIC KIDNEY DISEASE, UNSPECIFIED CKD STAGE: ICD-10-CM

## 2019-04-12 LAB
BASOPHILS # BLD AUTO: 0.02 K/UL (ref 0–0.2)
BASOPHILS NFR BLD: 0.3 % (ref 0–1.9)
DIFFERENTIAL METHOD: ABNORMAL
EOSINOPHIL # BLD AUTO: 0.1 K/UL (ref 0–0.5)
EOSINOPHIL NFR BLD: 1.1 % (ref 0–8)
ERYTHROCYTE [DISTWIDTH] IN BLOOD BY AUTOMATED COUNT: 13.8 % (ref 11.5–14.5)
FERRITIN SERPL-MCNC: 352 NG/ML (ref 20–300)
HCT VFR BLD AUTO: 32.2 % (ref 37–48.5)
HGB BLD-MCNC: 10.5 G/DL (ref 12–16)
IRON SERPL-MCNC: 74 UG/DL (ref 30–160)
LYMPHOCYTES # BLD AUTO: 1.8 K/UL (ref 1–4.8)
LYMPHOCYTES NFR BLD: 28 % (ref 18–48)
MCH RBC QN AUTO: 34.5 PG (ref 27–31)
MCHC RBC AUTO-ENTMCNC: 32.6 G/DL (ref 32–36)
MCV RBC AUTO: 106 FL (ref 82–98)
MONOCYTES # BLD AUTO: 0.3 K/UL (ref 0.3–1)
MONOCYTES NFR BLD: 5.4 % (ref 4–15)
NEUTROPHILS # BLD AUTO: 4.1 K/UL (ref 1.8–7.7)
NEUTROPHILS NFR BLD: 65.2 % (ref 38–73)
PLATELET # BLD AUTO: 317 K/UL (ref 150–350)
PMV BLD AUTO: 8.5 FL (ref 9.2–12.9)
RBC # BLD AUTO: 3.04 M/UL (ref 4–5.4)
SATURATED IRON: 20 % (ref 20–50)
TOTAL IRON BINDING CAPACITY: 366 UG/DL (ref 250–450)
TRANSFERRIN SERPL-MCNC: 247 MG/DL (ref 200–375)
WBC # BLD AUTO: 6.25 K/UL (ref 3.9–12.7)

## 2019-04-12 PROCEDURE — 82728 ASSAY OF FERRITIN: CPT | Mod: HCNC

## 2019-04-12 PROCEDURE — 36415 COLL VENOUS BLD VENIPUNCTURE: CPT | Mod: HCNC,PO

## 2019-04-12 PROCEDURE — 85025 COMPLETE CBC W/AUTO DIFF WBC: CPT | Mod: HCNC,PO

## 2019-04-12 PROCEDURE — 83540 ASSAY OF IRON: CPT | Mod: HCNC

## 2019-04-12 RX ORDER — HYDROCODONE BITARTRATE AND ACETAMINOPHEN 5; 325 MG/1; MG/1
1 TABLET ORAL EVERY 6 HOURS PRN
Qty: 90 TABLET | Refills: 0 | Status: SHIPPED | OUTPATIENT
Start: 2019-04-12 | End: 2019-05-28 | Stop reason: SDUPTHER

## 2019-04-12 NOTE — TELEPHONE ENCOUNTER
Reviewed - sent medication refill to pharmacy  [place] [facility] database queried/reviewed

## 2019-04-15 ENCOUNTER — TELEPHONE (OUTPATIENT)
Dept: HEMATOLOGY/ONCOLOGY | Facility: CLINIC | Age: 74
End: 2019-04-15

## 2019-04-16 ENCOUNTER — OFFICE VISIT (OUTPATIENT)
Dept: HEMATOLOGY/ONCOLOGY | Facility: CLINIC | Age: 74
End: 2019-04-16
Payer: MEDICARE

## 2019-04-16 VITALS
SYSTOLIC BLOOD PRESSURE: 131 MMHG | BODY MASS INDEX: 24.22 KG/M2 | HEIGHT: 61 IN | HEART RATE: 80 BPM | TEMPERATURE: 98 F | OXYGEN SATURATION: 97 % | WEIGHT: 128.31 LBS | DIASTOLIC BLOOD PRESSURE: 73 MMHG

## 2019-04-16 DIAGNOSIS — N18.9 ANEMIA IN CHRONIC KIDNEY DISEASE, UNSPECIFIED CKD STAGE: Primary | ICD-10-CM

## 2019-04-16 DIAGNOSIS — D63.1 ANEMIA IN CHRONIC KIDNEY DISEASE, UNSPECIFIED CKD STAGE: Primary | ICD-10-CM

## 2019-04-16 DIAGNOSIS — Z53.1 REFUSAL OF BLOOD TRANSFUSIONS AS PATIENT IS JEHOVAH'S WITNESS: ICD-10-CM

## 2019-04-16 PROCEDURE — 3075F SYST BP GE 130 - 139MM HG: CPT | Mod: HCNC,CPTII,S$GLB, | Performed by: INTERNAL MEDICINE

## 2019-04-16 PROCEDURE — 99213 OFFICE O/P EST LOW 20 MIN: CPT | Mod: HCNC,S$GLB,, | Performed by: INTERNAL MEDICINE

## 2019-04-16 PROCEDURE — 1101F PT FALLS ASSESS-DOCD LE1/YR: CPT | Mod: HCNC,CPTII,S$GLB, | Performed by: INTERNAL MEDICINE

## 2019-04-16 PROCEDURE — 99999 PR PBB SHADOW E&M-EST. PATIENT-LVL IV: ICD-10-PCS | Mod: PBBFAC,HCNC,, | Performed by: INTERNAL MEDICINE

## 2019-04-16 PROCEDURE — 99999 PR PBB SHADOW E&M-EST. PATIENT-LVL IV: CPT | Mod: PBBFAC,HCNC,, | Performed by: INTERNAL MEDICINE

## 2019-04-16 PROCEDURE — 3078F DIAST BP <80 MM HG: CPT | Mod: HCNC,CPTII,S$GLB, | Performed by: INTERNAL MEDICINE

## 2019-04-16 PROCEDURE — 99213 PR OFFICE/OUTPT VISIT, EST, LEVL III, 20-29 MIN: ICD-10-PCS | Mod: HCNC,S$GLB,, | Performed by: INTERNAL MEDICINE

## 2019-04-16 PROCEDURE — 1101F PR PT FALLS ASSESS DOC 0-1 FALLS W/OUT INJ PAST YR: ICD-10-PCS | Mod: HCNC,CPTII,S$GLB, | Performed by: INTERNAL MEDICINE

## 2019-04-16 PROCEDURE — 3075F PR MOST RECENT SYSTOLIC BLOOD PRESS GE 130-139MM HG: ICD-10-PCS | Mod: HCNC,CPTII,S$GLB, | Performed by: INTERNAL MEDICINE

## 2019-04-16 PROCEDURE — 3078F PR MOST RECENT DIASTOLIC BLOOD PRESSURE < 80 MM HG: ICD-10-PCS | Mod: HCNC,CPTII,S$GLB, | Performed by: INTERNAL MEDICINE

## 2019-04-16 RX ORDER — FOLIC ACID 1 MG/1
1 TABLET ORAL DAILY
COMMUNITY
End: 2019-05-24

## 2019-04-16 RX ORDER — GABAPENTIN 400 MG/1
400 CAPSULE ORAL 3 TIMES DAILY
COMMUNITY
End: 2019-08-26 | Stop reason: SDUPTHER

## 2019-04-16 NOTE — PROGRESS NOTES
Subjective:       Patient ID: Regino Lawrence is a 73 y.o. female.    Chief Complaint: Follow-up; Fatigue; Shortness of Breath; and Pain  Diagnosis: Anemia in CKD  Patient is a [employer]  .  HPI    The patient is seen today for chronic anemia in CKD. The patient reports that she has been diagnosed with JOLLY in the past.  She has been on oral iron supplementation therapy, but could not tolerate or did not respond, she is uncertain.  She is followed by GI and has undergone a colonoscopy earlier this year and was diagnosed with hemorrhoids for which she underwent banding procedure.  No melena, hematochezia,change in bowel habits.  She has also been diagnosed with B12 deficiency in the past, but reports she did not respond to B12 injections. No history of blood transfusions.  She is a [employer].  She reports that she remembers getting injections in the 1970s when her blood count was low.        She is followed by Rheumatology for history of sarcoidosis with associated myopathy and arthropathy.   .She has been treated in the  past with methotrexate and Plaquenil, both of which were ineffective  Cellcept and imuran caused some unknown side effect.   Colchicine  held due to low WBC   She continues on chronic steroid therapy, Prednisone 10mg qd    She reports SOB at rest and FAUSTIN x 3weeks occasionally associated with chest tightness  Intermittent nausea - chronic  No vomiting  Chronic diffuse arthralgias-stable   Mild  Chronic fatigue-stable    She continues to undergo Procrit therapy q 2wks  She undergoes intermittent IV iron therapy  Hb 10.5g/dl       s/p posterior C3-C7 laminectomy and fusion on 11/16/15 by Dr. Conner, with left sided back, hip, and leg pain that is present with standing and resolves with sitting.   MRI showed an L5/S1 HNP that abuts the left L5 nerve root.       history of cervical spinal stenosis s/p posterior C3-C7 laminectomy and fusion on 11/16/15 by Dr. Conner. She was last seen in clinic  "on 18. At that time, she continued to have complaints of left sided back, hip, and leg pain that was present with standing and resolved with sitting. She was referred to Dr. Richards for a left L5 transforaminal injection which was completed on 10/8/18.      CBC  reveals wbc 6250/mm3  Hb 10.5  g/dl Hct 32.2% Plt ct 317k    Patient was in the ED 2018 and was seen on   at [facility] for back issues  She is followed by Neurosurgery for cervical spinal stenosis s/p posterior C3-C7 laminectomy and fusion          PAST MEDICAL HISTORY:  Acid reflux, alopecia, anemia, anxiety disorder, chronic  kidney disease, depression, diabetes mellitus type 2, hyperlipidemia,  hypertension, hypothyroidism, osteoporosis, sarcoidosis.    PAST SURGICAL HISTORY:  Cholecystectomy, , tubal ligation, carpal tunnel release, cataracts.    FAMILY HISTORY: Unremarkable for cancer. Significant for HTN.       Review of Systems   Constitutional: Positive for fatigue. Negative for activity change and appetite change.   HENT: Negative for hearing loss and nosebleeds.    Eyes: Negative for visual disturbance.   Respiratory: Negative for cough and shortness of breath.    Cardiovascular: Negative for chest pain and leg swelling.   Gastrointestinal: Negative for abdominal pain, constipation, diarrhea and nausea.   Genitourinary: Negative for flank pain and urgency.   Musculoskeletal: Positive for arthralgias and back pain. Negative for gait problem and joint swelling.   Skin: Negative for rash.        No petechiae, ecchymoses   Neurological: Negative for light-headedness and headaches.   Hematological: Negative for adenopathy. Does not bruise/bleed easily.       Objective:       .  Vitals:    19 1010   BP: 131/73   BP Location: Left arm   Patient Position: Sitting   BP Method: Medium (Automatic)   Pulse: 80   Temp: 97.7 °F (36.5 °C)   TempSrc: Oral   SpO2: 97%   Weight: 58.2 kg (128 lb 4.9 oz)   Height: 5' 0.5" (1.537 m) "       Physical Exam   Constitutional: She is oriented to person, place, and time. She appears well-developed and well-nourished.   HENT:   Head: Normocephalic.   Mouth/Throat: Oropharynx is clear and moist. No oropharyngeal exudate.   Eyes: Pupils are equal, round, and reactive to light. Conjunctivae and lids are normal. No scleral icterus.   Neck: Normal range of motion. Neck supple. No thyromegaly present.   Cardiovascular: Normal rate, regular rhythm and normal heart sounds.   No murmur heard.  Pulmonary/Chest: Breath sounds normal. She has no wheezes. She has no rales.   Abdominal: Soft. Bowel sounds are normal. She exhibits no distension and no mass. There is no hepatosplenomegaly. There is no tenderness. There is no rebound and no guarding.   Musculoskeletal: Normal range of motion. She exhibits no edema or tenderness.   Lymphadenopathy:     She has no cervical adenopathy.     She has no axillary adenopathy.        Right: No supraclavicular adenopathy present.        Left: No supraclavicular adenopathy present.   Neurological: She is alert and oriented to person, place, and time. No cranial nerve deficit. Coordination normal.   Skin: Skin is warm and dry. No ecchymosis, no petechiae and no rash noted. No erythema.   Psychiatric: She has a normal mood and affect.             Lab Results   Component Value Date    WBC 6.25 04/12/2019    HGB 10.5 (L) 04/12/2019    HCT 32.2 (L) 04/12/2019     (H) 04/12/2019     04/12/2019     Lab Results   Component Value Date    IRON 74 04/12/2019    TIBC 366 04/12/2019    FERRITIN 352 (H) 04/12/2019     SPEP-nl          CT renal 3/6/2017   IMPRESSION:  1.  No renal, ureteral or bladder calculi.  No hydronephrosis or ureterectasis.  2.  Poorly distended bladder.  Mild bladder wall prominence.  Mild cystitis cannot be   excluded.  3.  Moderate constipation.  Normal appendix      Lab Results   Component Value Date    IRON 74 04/12/2019    TIBC 366 04/12/2019     FERRITIN 352 (H) 04/12/2019       Assessment:       1. Anemia in chronic kidney disease, unspecified CKD stage    2. Refusal of blood transfusions as patient is Gnosticist        Plan:   1-2  Pt clinically stable  Pt is a Yarsanism and declines/not interested in blood and blood products due to Yazdanism beliefs  Hb 10.5   g/dl   Ferritin 352  Fe sats 20  Cont procrit to 20,000u q 2wk ( lab parameters  Pt followed by Nephrology . It has been determinedearly CKD stage III, suspect due to age-related renal nephron loss, along with possible diabetic nephropathy v. hypertensive nephrosclerosis      CBC q2wks STANDING    Follow-up with PCP for med mgmt    F/u 2 mos with Fe studies    CC: Micaela Mendoza M.D.

## 2019-04-18 ENCOUNTER — INFUSION (OUTPATIENT)
Dept: INFUSION THERAPY | Facility: HOSPITAL | Age: 74
End: 2019-04-18
Attending: INTERNAL MEDICINE
Payer: MEDICARE

## 2019-04-18 VITALS
SYSTOLIC BLOOD PRESSURE: 158 MMHG | TEMPERATURE: 98 F | HEART RATE: 85 BPM | DIASTOLIC BLOOD PRESSURE: 85 MMHG | RESPIRATION RATE: 16 BRPM | OXYGEN SATURATION: 98 %

## 2019-04-18 DIAGNOSIS — D63.1 ANEMIA IN CHRONIC KIDNEY DISEASE, UNSPECIFIED CKD STAGE: Primary | ICD-10-CM

## 2019-04-18 DIAGNOSIS — N18.9 ANEMIA IN CHRONIC KIDNEY DISEASE, UNSPECIFIED CKD STAGE: Primary | ICD-10-CM

## 2019-04-18 DIAGNOSIS — Z53.1 REFUSAL OF BLOOD TRANSFUSIONS AS PATIENT IS JEHOVAH'S WITNESS: ICD-10-CM

## 2019-04-18 PROCEDURE — 63600175 PHARM REV CODE 636 W HCPCS: Mod: HCNC,JG,EC | Performed by: INTERNAL MEDICINE

## 2019-04-18 PROCEDURE — 96372 THER/PROPH/DIAG INJ SC/IM: CPT | Mod: HCNC

## 2019-04-18 RX ADMIN — ERYTHROPOIETIN 20000 UNITS: 20000 INJECTION, SOLUTION INTRAVENOUS; SUBCUTANEOUS at 10:04

## 2019-04-18 NOTE — PLAN OF CARE
Problem: Adult Inpatient Plan of Care  Goal: Plan of Care Review  Outcome: Ongoing (interventions implemented as appropriate)  Patient labs reviewed. Hbg 10.5 Patient received Procrit injection. Tolerated well. VSS. Received discharge instructions and follow up appointments. Verbalized understanding and ambulated unassisted off unit.

## 2019-04-22 ENCOUNTER — CLINICAL SUPPORT (OUTPATIENT)
Dept: REHABILITATION | Facility: HOSPITAL | Age: 74
End: 2019-04-22
Attending: NEUROLOGICAL SURGERY
Payer: MEDICARE

## 2019-04-22 DIAGNOSIS — Z74.09 IMPAIRED FUNCTIONAL MOBILITY, BALANCE, GAIT, AND ENDURANCE: ICD-10-CM

## 2019-04-22 DIAGNOSIS — M62.81 MUSCLE WEAKNESS OF LOWER EXTREMITY: ICD-10-CM

## 2019-04-22 DIAGNOSIS — R29.3 POOR POSTURE: ICD-10-CM

## 2019-04-22 PROCEDURE — 97110 THERAPEUTIC EXERCISES: CPT | Mod: HCNC,PN

## 2019-04-22 PROCEDURE — 97530 THERAPEUTIC ACTIVITIES: CPT | Mod: HCNC,PN

## 2019-04-22 NOTE — PROGRESS NOTES
Physical Therapy Daily Treatment Note     Name: Regino Lawrence  Clinic Number: [id number]    Therapy Diagnosis:   Encounter Diagnoses   Name Primary?    Muscle weakness of lower extremity     Poor posture     Impaired functional mobility, balance, gait, and endurance      Physician: Fab Conner MD    Visit Date: 4/22/2019    Physician Orders: PT Eval and Treat - fall prevention  Medical Diagnosis from Referral: Lumbar disc herniation with radiculopathy  Evaluation Date: 3/11/2019  Last PN: 4/22/19 (visit 4)  Authorization Period Expiration: 12/31/19  Plan of Care Expiration: 6/11/19  Visit # / Visits authorized: 4/ 20    Time In: 1117 (pt late to session)  Time Out: 1200  Total Billable Time: 25 minutes    Precautions: Diabetes, Fall and osteoporosis, B foot neuropathy, uncontrolled BP, CKD    Subjective     Pt reports she just pain medication. Pain is at worst in low back 0/10. 50% improvement in ability to walk long distances since start of care. She felt good with standing exercises last treatment.   She was compliant with home exercise program.  Response to previous treatment: good- no soreness  Functional change: can stand without legs giving out    Pain: 10/10  Location: everywhere    Objective     Taken 4/22/19:  Ely's test: B 100 deg    Regino received therapeutic exercises to develop strength, endurance, ROM, flexibility, posture and core stabilization for 33 minutes including:    Nustep x 6' level 2  +Prone quad stretch w strap 3 x 30 sec ea  Standing hip abd 2 x 10 ea  Standing marching 2 x 10 ea  Standing heel raises 2 x 10  +Mini squats x 20    Not performed today:  Seated Heel/Toe Raises x 20   Seated Marches x 2'   Seated hip abduction 2' x Yellow TB  Seated hip adduction 2' x 3''   Seated ball roll outs x 2'   Seated LAQ x 10 ea LE     Regino participated in dynamic functional therapeutic activities to improve functional performance for 10 minutes, including:    +Sit to stand from std chair  w 1 hand to stand and 0 UE to sit 2 x 10    Regino participated in gait training to improve functional mobility and safety for 0  minutes, including:    Regino received hot pack for 0 minutes to low back seated.     Home Exercises Provided and Patient Education Provided     Education provided:   - compliance with HEP.    Written Home Exercises Provided: Patient instructed to cont prior HEP.  Exercises were reviewed and Regino was able to demonstrate them prior to the end of the session.  Regino demonstrated good  understanding of the education provided.     See EMR under Patient Instructions for exercises provided this visit.     CMS Impairment/Limitation/Restriction for FOTO lumbar spine Survey    Therapist reviewed FOTO scores for Regino Lawrence on 4/22/2019.   FOTO documents entered into [email or web address] - see Media section.    Limitation Score: 63%  Category: Mobility    Current : CL = least 60% but < 80% impaired, limited or restricted  Goal: CK = at least 40% but < 60% impaired, limited or restricted  Discharge: CL = least 60% but < 80% impaired, limited or restricted       Assessment     Regino was re-assessed today with 5/5 STGs being met indicating improvements in B hip flexor/quad flexibility, tolerance for B LE strengthening and HEP, ability to walk long distances, and low back pain since start of care. She continues with postural imbalance, B LE weakness, and impaired gait, balance, endurance, and functional mobility. Pt could benefit from continued physical therapy services to address deficits.     She had good tolerance to treatment today with no adverse effects. Post-treatment body pain rated as 0/10 that pt reports is due to pain medication. Pt eventually able to rise from standard chair without UE support following heavy verbal cueing to increase forward trunk lean. Pt with improvement in symptoms with standing therapeutic exercises. Continues with B hip flexor tightness.     Regino is  progressing well  towards her goals.     Pt prognosis is Good.     Pt will continue to benefit from skilled outpatient physical therapy to address the deficits listed in the problem list box on initial evaluation, provide pt/family education and to maximize pt's level of independence in the home and community environment.     Pt's spiritual, cultural and educational needs considered and pt agreeable to plan of care and goals.     Anticipated barriers to physical therapy: pain limited and CHF     GOALS: Short Term Goals:  6 weeks  1. Report decreased low back pain  <  / =  5/10 at worst to increase tolerance for prolonged standing.- met 4/22/19   2. Pt will be able to tolerate multi-directional LE strengthening in order to improve ability to perform household chores.- met 4/22/19   3. Pt will report 50% improvement in ability to walk long distances since start of care to indicate improved functional mobility.- met 4/22/19    4. Pt will increase B Ely's test to 100 deg to indicate improved hip flexor/quad flexibility and posture.- met 4/22/19   5. Pt to tolerate HEP to improve ROM and independence with ADL's.- met 4/22/19      Long Term Goals: 12 weeks  1. Report decreased low back pain  <  / =  3/10 at worst to increase tolerance for prolonged standing.   2. Pt will be able to perform 2 x 10 multi-directional LE strengthening without fatigue in order to improve ability to perform household chores.  3. Pt will report 80% improvement in ability to walk long distances since start of care to indicate improved functional mobility.   4. Pt will increase B Ely's test to 110 deg to indicate improved hip flexor/quad flexibility and posture.  5. Pt to be Independent with HEP to improve ROM and independence with ADL's.    Plan     Progress LE strengthening and flexibility/posture.     Jessie Fox PT

## 2019-05-01 ENCOUNTER — OFFICE VISIT (OUTPATIENT)
Dept: CARDIOLOGY | Facility: CLINIC | Age: 74
End: 2019-05-01
Payer: MEDICARE

## 2019-05-01 VITALS
WEIGHT: 127.88 LBS | DIASTOLIC BLOOD PRESSURE: 80 MMHG | OXYGEN SATURATION: 98 % | HEIGHT: 60 IN | SYSTOLIC BLOOD PRESSURE: 170 MMHG | HEART RATE: 68 BPM | BODY MASS INDEX: 25.11 KG/M2

## 2019-05-01 DIAGNOSIS — I10 ESSENTIAL HYPERTENSION: Primary | Chronic | ICD-10-CM

## 2019-05-01 DIAGNOSIS — R07.89 CHEST PAIN, ATYPICAL: ICD-10-CM

## 2019-05-01 DIAGNOSIS — I70.0 CALCIFICATION OF AORTA: ICD-10-CM

## 2019-05-01 DIAGNOSIS — E11.69 COMBINED HYPERLIPIDEMIA ASSOCIATED WITH TYPE 2 DIABETES MELLITUS: Chronic | ICD-10-CM

## 2019-05-01 DIAGNOSIS — E78.2 COMBINED HYPERLIPIDEMIA ASSOCIATED WITH TYPE 2 DIABETES MELLITUS: Chronic | ICD-10-CM

## 2019-05-01 DIAGNOSIS — R06.09 DOE (DYSPNEA ON EXERTION): ICD-10-CM

## 2019-05-01 DIAGNOSIS — E11.8 CONTROLLED TYPE 2 DIABETES MELLITUS WITH COMPLICATION, WITHOUT LONG-TERM CURRENT USE OF INSULIN: Chronic | ICD-10-CM

## 2019-05-01 PROCEDURE — 3077F SYST BP >= 140 MM HG: CPT | Mod: HCNC,CPTII,S$GLB, | Performed by: INTERNAL MEDICINE

## 2019-05-01 PROCEDURE — 1101F PT FALLS ASSESS-DOCD LE1/YR: CPT | Mod: HCNC,CPTII,S$GLB, | Performed by: INTERNAL MEDICINE

## 2019-05-01 PROCEDURE — 1101F PR PT FALLS ASSESS DOC 0-1 FALLS W/OUT INJ PAST YR: ICD-10-PCS | Mod: HCNC,CPTII,S$GLB, | Performed by: INTERNAL MEDICINE

## 2019-05-01 PROCEDURE — 3077F PR MOST RECENT SYSTOLIC BLOOD PRESSURE >= 140 MM HG: ICD-10-PCS | Mod: HCNC,CPTII,S$GLB, | Performed by: INTERNAL MEDICINE

## 2019-05-01 PROCEDURE — 99213 PR OFFICE/OUTPT VISIT, EST, LEVL III, 20-29 MIN: ICD-10-PCS | Mod: HCNC,S$GLB,, | Performed by: INTERNAL MEDICINE

## 2019-05-01 PROCEDURE — 3079F PR MOST RECENT DIASTOLIC BLOOD PRESSURE 80-89 MM HG: ICD-10-PCS | Mod: HCNC,CPTII,S$GLB, | Performed by: INTERNAL MEDICINE

## 2019-05-01 PROCEDURE — 99213 OFFICE O/P EST LOW 20 MIN: CPT | Mod: HCNC,S$GLB,, | Performed by: INTERNAL MEDICINE

## 2019-05-01 PROCEDURE — 99999 PR PBB SHADOW E&M-EST. PATIENT-LVL III: ICD-10-PCS | Mod: PBBFAC,HCNC,, | Performed by: INTERNAL MEDICINE

## 2019-05-01 PROCEDURE — 3079F DIAST BP 80-89 MM HG: CPT | Mod: HCNC,CPTII,S$GLB, | Performed by: INTERNAL MEDICINE

## 2019-05-01 PROCEDURE — 3044F HG A1C LEVEL LT 7.0%: CPT | Mod: HCNC,CPTII,S$GLB, | Performed by: INTERNAL MEDICINE

## 2019-05-01 PROCEDURE — 99999 PR PBB SHADOW E&M-EST. PATIENT-LVL III: CPT | Mod: PBBFAC,HCNC,, | Performed by: INTERNAL MEDICINE

## 2019-05-01 PROCEDURE — 3044F PR MOST RECENT HEMOGLOBIN A1C LEVEL <7.0%: ICD-10-PCS | Mod: HCNC,CPTII,S$GLB, | Performed by: INTERNAL MEDICINE

## 2019-05-01 NOTE — PROGRESS NOTES
Subjective:    Patient ID:  Regino Lawrence is a 73 y.o. female who presents for follow-up of Results      HPI     Previously saw Dr Holden  HPI: She is schedule for gynecologic surgery on Monday. As part of her preoperative evaluation, an ECG was done which she was told was abnormal. I do not have the ECG to review. She has no cardiac history. Regino Lawrence denies any chest pain, shortness of breath, PND, orthopnea, palpitations, or syncope. Her ECG today is normal. All ECG's reviewed in EPIC were normal. She walks with a cane due to arthritis.     1. Controlled type 2 diabetes mellitus without complication, without long-term current use of insulin    2. Preop cardiovascular exam : She has 0 clinical risk factors according to the RCRI placing her at low risk for a perioperative cardiac complication. She has no symptoms suggestive of cardiopulmonary disease, and her ECG is normal. No further cardiac testing is required prior to her surgery. She should continue on her clonidine during the perioperative period to avoid rebound hypertension.   3. Essential hypertension       Echo 4/11/19  · Normal left ventricular systolic function. The estimated ejection fraction is 55%  · Concentric left ventricular hypertrophy.  · Grade I (mild) left ventricular diastolic dysfunction consistent with impaired relaxation.     Stress test 4/11/19  · Normal myocardial perfusion scan  · There were no arrhythmias during stress.  · There was no ST segment deviation noted during stress.  · The patient reported dizziness, SOB (non-anginal) and light headedness during the stress test.  · The perfusion scan is free of evidence from myocardial ischemia or injury.  · LVEF post-stress is 62%  · The EKG portion of this study is negative for myocardial ischemia.       4/3/19 Reports worsening FAUSTIN for several months - sometimes associated with chest tightness  EKG sinus tachycardia 104 otherwise ok  Denies prior CAD  Walks with a walker    Still  with episodes of chest tightness - usually lasts all day  Not reproducible with palpation        Review of Systems   Constitution: Negative for decreased appetite.   HENT: Negative for ear discharge.    Eyes: Negative for blurred vision.   Respiratory: Negative for hemoptysis.    Endocrine: Negative for polyphagia.   Hematologic/Lymphatic: Negative for adenopathy.   Skin: Negative for color change.   Musculoskeletal: Negative for joint swelling.   Genitourinary: Negative for bladder incontinence.   Neurological: Negative for brief paralysis.   Psychiatric/Behavioral: Negative for hallucinations.   Allergic/Immunologic: Negative for hives.        Objective:    Physical Exam   Constitutional: She is oriented to person, place, and time. She appears well-developed and well-nourished.   HENT:   Head: Normocephalic and atraumatic.   Eyes: Pupils are equal, round, and reactive to light. Conjunctivae are normal.   Neck: Normal range of motion. Neck supple.   Cardiovascular: Normal rate, normal heart sounds and intact distal pulses.   Pulmonary/Chest: Effort normal and breath sounds normal.   Abdominal: Soft. Bowel sounds are normal.   Musculoskeletal: Normal range of motion.   Neurological: She is alert and oriented to person, place, and time.   Skin: Skin is warm and dry.         Assessment:       1. Essential hypertension    2. Calcification of aorta    3. Combined 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    4. Controlled type 2 diabetes mellitus with complication, without long-term current use of insulin    5. FAUSTIN (dyspnea on exertion)    6. Chest pain, atypical         Plan:       CP atypical - likely musculoskeletal - will observe  OV 3 months

## 2019-05-02 DIAGNOSIS — D86.9 SARCOIDOSIS: Chronic | ICD-10-CM

## 2019-05-02 DIAGNOSIS — G72.9 MYOPATHY: ICD-10-CM

## 2019-05-02 RX ORDER — METHOTREXATE 2.5 MG/1
TABLET ORAL
Qty: 72 TABLET | Refills: 0 | Status: SHIPPED | OUTPATIENT
Start: 2019-05-02 | End: 2019-07-30 | Stop reason: SDUPTHER

## 2019-05-03 ENCOUNTER — INFUSION (OUTPATIENT)
Dept: INFUSION THERAPY | Facility: HOSPITAL | Age: 74
End: 2019-05-03
Attending: INTERNAL MEDICINE
Payer: MEDICARE

## 2019-05-03 VITALS
TEMPERATURE: 98 F | BODY MASS INDEX: 24.97 KG/M2 | HEART RATE: 86 BPM | RESPIRATION RATE: 16 BRPM | WEIGHT: 127.88 LBS | OXYGEN SATURATION: 99 % | DIASTOLIC BLOOD PRESSURE: 77 MMHG | SYSTOLIC BLOOD PRESSURE: 135 MMHG

## 2019-05-03 DIAGNOSIS — D63.1 ANEMIA IN CHRONIC KIDNEY DISEASE, UNSPECIFIED CKD STAGE: Primary | ICD-10-CM

## 2019-05-03 DIAGNOSIS — Z53.1 REFUSAL OF BLOOD TRANSFUSIONS AS PATIENT IS JEHOVAH'S WITNESS: ICD-10-CM

## 2019-05-03 DIAGNOSIS — N18.9 ANEMIA IN CHRONIC KIDNEY DISEASE, UNSPECIFIED CKD STAGE: Primary | ICD-10-CM

## 2019-05-03 PROCEDURE — 96372 THER/PROPH/DIAG INJ SC/IM: CPT | Mod: HCNC

## 2019-05-03 PROCEDURE — 63600175 PHARM REV CODE 636 W HCPCS: Mod: HCNC,JG,EC | Performed by: INTERNAL MEDICINE

## 2019-05-03 RX ADMIN — ERYTHROPOIETIN 20000 UNITS: 20000 INJECTION, SOLUTION INTRAVENOUS; SUBCUTANEOUS at 11:05

## 2019-05-03 NOTE — PLAN OF CARE
Problem: Adult Inpatient Plan of Care  Goal: Plan of Care Review  Outcome: Ongoing (interventions implemented as appropriate)  Hgb 10.8 Patient received Procrit injection. Tolerated well. VSS. Received discharge instructions and follow up appointments. Verbalized understanding and ambulated with walker off unit accompanied by daughter.

## 2019-05-06 ENCOUNTER — CLINICAL SUPPORT (OUTPATIENT)
Dept: REHABILITATION | Facility: HOSPITAL | Age: 74
End: 2019-05-06
Attending: NEUROLOGICAL SURGERY
Payer: MEDICARE

## 2019-05-06 DIAGNOSIS — Z74.09 IMPAIRED FUNCTIONAL MOBILITY, BALANCE, GAIT, AND ENDURANCE: ICD-10-CM

## 2019-05-06 DIAGNOSIS — M62.81 MUSCLE WEAKNESS OF LOWER EXTREMITY: ICD-10-CM

## 2019-05-06 DIAGNOSIS — R29.3 POOR POSTURE: ICD-10-CM

## 2019-05-06 PROCEDURE — 97110 THERAPEUTIC EXERCISES: CPT | Mod: HCNC,PN

## 2019-05-06 NOTE — PROGRESS NOTES
Physical Therapy Daily Treatment Note     Name: Regino Lawrence  Clinic Number: [id number]    Therapy Diagnosis:   Encounter Diagnoses   Name Primary?    Muscle weakness of lower extremity     Poor posture     Impaired functional mobility, balance, gait, and endurance      Physician: Fab Conner MD    Visit Date: 5/6/2019    Physician Orders: PT Eval and Treat - fall prevention  Medical Diagnosis from Referral: Lumbar disc herniation with radiculopathy  Evaluation Date: 3/11/2019  Last PN: 4/22/19 (visit 4)  Authorization Period Expiration: 12/31/19  Plan of Care Expiration: 6/11/19  Visit # / Visits authorized: 4/ 20    Time In: 1320  Time Out: 1410  Total Billable Time: 30 minutes    Precautions: Diabetes, Fall and osteoporosis, B foot neuropathy, uncontrolled BP, CKD    Subjective     Pt reports that she is having increased pain on the left hip today.  Pt states that it has been locking up on her.  Pt also states that the pain has decreased to a 9/10 since she took her pain medicine.    She was compliant with home exercise program.  Response to previous treatment: okay  Functional change: can stand without legs giving out    Pain: 9/10  Location: left hip     Objective     Taken 4/22/19:  Ely's test: B 100 deg    Regino received therapeutic exercises to develop strength, endurance, ROM, flexibility, posture and core stabilization for 33 minutes including:    Nustep x 10' level 2  Prone quad stretch w strap 3 x 30 sec ea  + Standing Calf stretch 3 x 20''   Standing hip abd 2 x 10 ea  Standing marching 2 x 10 ea  Standing heel raises 2 x 10  Mini squats x 20  + Glute sets 20 x 5'' hold        Regino participated in dynamic functional therapeutic activities to improve functional performance for 10 minutes, including:    Sit to stand from std chair w 1 hand to stand and 0 UE to sit 2 x 10        Regino participated in gait training to improve functional mobility and safety for 0  minutes,  including:    Regino received hot pack for 10 minutes to low back seated.     Home Exercises Provided and Patient Education Provided     Education provided:   - compliance with HEP.    Written Home Exercises Provided: Patient instructed to cont prior HEP.  Exercises were reviewed and Regino was able to demonstrate them prior to the end of the session.  Regino demonstrated good  understanding of the education provided.     See EMR under Patient Instructions for exercises provided this visit.     CMS Impairment/Limitation/Restriction for FOTO lumbar spine Survey    Therapist reviewed FOTO scores for Regino Lawrence on 5/6/2019.   FOTO documents entered into [employer] - see Media section.    Limitation Score: 63%  Category: Mobility    Current : CL = least 60% but < 80% impaired, limited or restricted  Goal: CK = at least 40% but < 60% impaired, limited or restricted  Discharge: CL = least 60% but < 80% impaired, limited or restricted       Assessment     Regino tolerated treatment session well today.  Patient with good tolerance to exercises despite complaints of pain upon arrival to appointment today.  Patient able to progress slightly with exercises without complaints of pain.  Patient with reduction of pain post treatment session.   Regino is  progressing well towards her goals.     Pt prognosis is Good.     Pt will continue to benefit from skilled outpatient physical therapy to address the deficits listed in the problem list box on initial evaluation, provide pt/family education and to maximize pt's level of independence in the home and community environment.     Pt's spiritual, cultural and educational needs considered and pt agreeable to plan of care and goals.     Anticipated barriers to physical therapy: pain limited and CHF     GOALS: Short Term Goals:  6 weeks  1. Report decreased low back pain  <  / =  5/10 at worst to increase tolerance for prolonged standing.- met 4/22/19   2. Pt will be able to tolerate  multi-directional LE strengthening in order to improve ability to perform household chores.- met 4/22/19   3. Pt will report 50% improvement in ability to walk long distances since start of care to indicate improved functional mobility.- met 4/22/19    4. Pt will increase B Ely's test to 100 deg to indicate improved hip flexor/quad flexibility and posture.- met 4/22/19   5. Pt to tolerate HEP to improve ROM and independence with ADL's.- met 4/22/19      Long Term Goals: 12 weeks  1. Report decreased low back pain  <  / =  3/10 at worst to increase tolerance for prolonged standing.   2. Pt will be able to perform 2 x 10 multi-directional LE strengthening without fatigue in order to improve ability to perform household chores.  3. Pt will report 80% improvement in ability to walk long distances since start of care to indicate improved functional mobility.   4. Pt will increase B Ely's test to 110 deg to indicate improved hip flexor/quad flexibility and posture.  5. Pt to be Independent with HEP to improve ROM and independence with ADL's.    Plan     Progress LE strengthening and flexibility/posture.     Luz Maria Cohn, PTA

## 2019-05-09 ENCOUNTER — PATIENT MESSAGE (OUTPATIENT)
Dept: PODIATRY | Facility: CLINIC | Age: 74
End: 2019-05-09

## 2019-05-09 ENCOUNTER — PATIENT MESSAGE (OUTPATIENT)
Dept: FAMILY MEDICINE | Facility: CLINIC | Age: 74
End: 2019-05-09

## 2019-05-11 ENCOUNTER — OFFICE VISIT (OUTPATIENT)
Dept: URGENT CARE | Facility: CLINIC | Age: 74
End: 2019-05-11
Payer: MEDICARE

## 2019-05-11 VITALS
TEMPERATURE: 98 F | WEIGHT: 128 LBS | RESPIRATION RATE: 16 BRPM | BODY MASS INDEX: 25.13 KG/M2 | HEART RATE: 92 BPM | HEIGHT: 60 IN | DIASTOLIC BLOOD PRESSURE: 90 MMHG | SYSTOLIC BLOOD PRESSURE: 160 MMHG | OXYGEN SATURATION: 95 %

## 2019-05-11 DIAGNOSIS — M70.62 TROCHANTERIC BURSITIS OF LEFT HIP: Primary | ICD-10-CM

## 2019-05-11 PROCEDURE — 3077F PR MOST RECENT SYSTOLIC BLOOD PRESSURE >= 140 MM HG: ICD-10-PCS | Mod: CPTII,S$GLB,, | Performed by: STUDENT IN AN ORGANIZED HEALTH CARE EDUCATION/TRAINING PROGRAM

## 2019-05-11 PROCEDURE — 99214 OFFICE O/P EST MOD 30 MIN: CPT | Mod: S$GLB,,, | Performed by: STUDENT IN AN ORGANIZED HEALTH CARE EDUCATION/TRAINING PROGRAM

## 2019-05-11 PROCEDURE — 1101F PT FALLS ASSESS-DOCD LE1/YR: CPT | Mod: CPTII,S$GLB,, | Performed by: STUDENT IN AN ORGANIZED HEALTH CARE EDUCATION/TRAINING PROGRAM

## 2019-05-11 PROCEDURE — 3080F PR MOST RECENT DIASTOLIC BLOOD PRESSURE >= 90 MM HG: ICD-10-PCS | Mod: CPTII,S$GLB,, | Performed by: STUDENT IN AN ORGANIZED HEALTH CARE EDUCATION/TRAINING PROGRAM

## 2019-05-11 PROCEDURE — 1101F PR PT FALLS ASSESS DOC 0-1 FALLS W/OUT INJ PAST YR: ICD-10-PCS | Mod: CPTII,S$GLB,, | Performed by: STUDENT IN AN ORGANIZED HEALTH CARE EDUCATION/TRAINING PROGRAM

## 2019-05-11 PROCEDURE — 3080F DIAST BP >= 90 MM HG: CPT | Mod: CPTII,S$GLB,, | Performed by: STUDENT IN AN ORGANIZED HEALTH CARE EDUCATION/TRAINING PROGRAM

## 2019-05-11 PROCEDURE — 99214 PR OFFICE/OUTPT VISIT, EST, LEVL IV, 30-39 MIN: ICD-10-PCS | Mod: S$GLB,,, | Performed by: STUDENT IN AN ORGANIZED HEALTH CARE EDUCATION/TRAINING PROGRAM

## 2019-05-11 PROCEDURE — 3077F SYST BP >= 140 MM HG: CPT | Mod: CPTII,S$GLB,, | Performed by: STUDENT IN AN ORGANIZED HEALTH CARE EDUCATION/TRAINING PROGRAM

## 2019-05-11 RX ORDER — LIDOCAINE 50 MG/G
1 PATCH TOPICAL DAILY
Qty: 15 PATCH | Refills: 0 | Status: SHIPPED | OUTPATIENT
Start: 2019-05-11 | End: 2019-05-26

## 2019-05-11 NOTE — PROGRESS NOTES
Subjective:       Patient ID: Regino Lawrence is a 73 y.o. female.    Vitals:  height is 5' (1.524 m) and weight is 58.1 kg (128 lb). Her temperature is 97.7 °F (36.5 °C). Her blood pressure is 160/90 (abnormal) and her pulse is 92. Her respiration is 16 and oxygen saturation is 95%.     Chief Complaint: Hip Pain    Patient with chronic pain and L trochanteric bursitis presenting for L hip pain.    Hip Pain    The incident occurred more than 1 week ago (1 month). There was no injury mechanism. The pain is present in the left leg and left hip. The quality of the pain is described as shooting. The pain is at a severity of 10/10. The pain is severe. The pain has been constant since onset. Associated symptoms include a loss of motion (chronic). Pertinent negatives include no inability to bear weight, loss of sensation, muscle weakness or numbness. Associated symptoms comments: Difficulty walking (chronic). She reports no foreign bodies present. The symptoms are aggravated by movement. Treatments tried: Acetaminophen-hydrocodone. The treatment provided mild relief.   72yo F with HTN, DMII, chronic pain with polyneuropathy and lumbar stenosis, and L trochanteric bursitis presenting for L hip pain. Pt states it she was diagnosis with L trochanteric bursitis ~February, joint injection done in March and symptoms improved but have no returned over last 2-4 wks. No new weakness, numbness, or pain compared to chronic symptoms.    Constitution: Negative for chills, sweating and fever.   HENT: Negative for facial swelling, facial trauma and nosebleeds.    Neck: Negative for neck pain, neck stiffness and neck swelling.   Cardiovascular: Negative for chest trauma, chest pain and leg swelling.   Eyes: Negative for eye trauma, eye pain and vision loss.   Gastrointestinal: Negative for abdominal trauma, abdominal pain and bowel incontinence.   Genitourinary: Negative for bladder incontinence, genital trauma and pelvic pain.    Musculoskeletal: Positive for pain, joint pain, back pain and history of spine disorder. Negative for trauma and joint swelling.   Skin: Negative for color change, wound and abrasion.   Neurological: Negative for dizziness, passing out and numbness.   Psychiatric/Behavioral: Negative for confusion, agitation and sleep disturbance.       Objective:       Vitals:    05/11/19 1145   BP: (!) 160/90   Pulse: 92   Resp: 16   Temp: 97.7 °F (36.5 °C)   SpO2: 95%   Weight: 58.1 kg (128 lb)   Height: 5' (1.524 m)     Physical Exam   Constitutional: She is oriented to person, place, and time. She appears well-developed and well-nourished. No distress.   HENT:   Head: Normocephalic and atraumatic.   Nose: Nose normal.   Eyes: Conjunctivae and EOM are normal. No scleral icterus.   Neck: Normal range of motion. Neck supple.   Cardiovascular: Normal rate, regular rhythm and normal heart sounds.   Pulmonary/Chest: Effort normal and breath sounds normal. She has no wheezes.   Musculoskeletal: She exhibits tenderness (TTP over L lateral hip with pain with standing, antalgic gait with walker; str intact in b/l LE, decreased sensation that's chronic). She exhibits no edema or deformity.   Neurological: She is alert and oriented to person, place, and time. No cranial nerve deficit (grossly intact).   Skin: Skin is warm and dry. No rash noted.   Psychiatric: She has a normal mood and affect. Her behavior is normal. Judgment and thought content normal.   Nursing note and vitals reviewed.      Assessment:       1. Trochanteric bursitis of left hip        Plan:         Trochanteric bursitis of left hip  -     lidocaine (LIDODERM) 5 %; Place 1 patch onto the skin once daily. Remove patch after 12 hours and do not use more than 1 per day for 15 days  Dispense: 15 patch; Refill: 0  - pt with hx of bleeding stomach ulcers and unable to take NSAIDS; on pain contract with [facility] rx; on chronic 5mg prednisone; follows with PT/OT  - to call for  Orthopedic appt on Monday for further management/possible joint injection    Diagnosis and medications reviewed with patient, questions answered, and return precautions given    Follow up in about 1 week (around 5/18/2019) for with Orthopedics for further treatment.    Juventino Dawson MD/MPH  [facility]  [facility]

## 2019-05-11 NOTE — PATIENT INSTRUCTIONS
Lidocaine dermal patch  What is this medicine?  LIDOCAINE (LYE melendez martin) causes loss of feeling in the skin and surrounding area. The medicine helps treat pain, including nerve pain.  How should I use this medicine?  This medicine is for external use only. Follow the directions on the prescription label or package.  Talk to your pediatrician regarding the use of this medicine in children. While this drug may be prescribed for children as young as 12 years for selected conditions, precautions do apply.  What side effects may I notice from receiving this medicine?  Side effects that you should report to your doctor or health care professional as soon as possible:  · allergic reactions like skin rash, itching or hives, swelling of the face, lips, or tongue  · breathing problems  · chest pain or chest tightness  · dizzines  Side effects that usually do not require medical attention (report to your doctor or health care professional if they continue or are bothersome):  · tingling, numbness at site where applied  What may interact with this medicine?  Do not take this medicine with any of the following medications:  · certain medicines for irregular heart beat  · MAOIs like Carbex, Eldepryl, Marplan, Nardil, and Parnate  This medicine may also interact with the following medications:  · other local anesthetics like pramoxine, tetracaine  Do not use any other skin products on the affected area without asking your doctor or health care professional.  What if I miss a dose?  Apply the patches as needed for pain.  Where should I keep my medicine?  Keep out of the reach of children.  See product for storage instructions. Each product may have different instructions.  What should I tell my health care provider before I take this medicine?  They need to know if you have any of these conditions:  · heart disease  · history of irregular heart beat  · liver disease  · skin conditions or sensitivity  · skin infection  · an  unusual or allergic reaction to lidocaine, parabens, other medicines, foods, dyes, or preservatives  · pregnant or trying to get pregnant  · breast-feeding  What should I watch for while using this medicine?  Tell your doctor or healthcare professional if your symptoms do not start to get better or if they get worse.  Be careful to avoid injury while the area is numb from the medicine, and you are not aware of pain.  If you are going to need surgery, a MRI, CT scan, or other procedure, tell your doctor that you are using this medicine. You may need to remove this patch before the procedure.  Do not get this medicine in your eyes. If you do, rinse out with plenty of cool tap water.  This medicine can make certain skin conditions worse. Only use it for conditions for which your doctor or health care professional has prescribed.  NOTE:This sheet is a summary. It may not cover all possible information. If you have questions about this medicine, talk to your doctor, pharmacist, or health care provider. Copyright© 2017 [employer]

## 2019-05-13 ENCOUNTER — DOCUMENTATION ONLY (OUTPATIENT)
Dept: REHABILITATION | Facility: HOSPITAL | Age: 74
End: 2019-05-13

## 2019-05-13 NOTE — PROGRESS NOTES
Physical Therapy: No show/Cancellation of Visit  Date: 05/13/2019        Patient cancelled today's PT appointment. Reason for cancellation: can't make it today. Patient's next scheduled appointment is 5/20.       Cancel: 5   (overall since start of care)  No show: 0    Therapist: Luz Maria Cohn PTA

## 2019-05-17 ENCOUNTER — LAB VISIT (OUTPATIENT)
Dept: LAB | Facility: HOSPITAL | Age: 74
End: 2019-05-17
Attending: INTERNAL MEDICINE
Payer: MEDICARE

## 2019-05-17 DIAGNOSIS — D63.1 ANEMIA IN CHRONIC KIDNEY DISEASE, UNSPECIFIED CKD STAGE: ICD-10-CM

## 2019-05-17 DIAGNOSIS — N18.9 ANEMIA IN CHRONIC KIDNEY DISEASE, UNSPECIFIED CKD STAGE: ICD-10-CM

## 2019-05-17 LAB
BASOPHILS # BLD AUTO: 0.01 K/UL (ref 0–0.2)
BASOPHILS NFR BLD: 0.2 % (ref 0–1.9)
DIFFERENTIAL METHOD: ABNORMAL
EOSINOPHIL # BLD AUTO: 0 K/UL (ref 0–0.5)
EOSINOPHIL NFR BLD: 0.7 % (ref 0–8)
ERYTHROCYTE [DISTWIDTH] IN BLOOD BY AUTOMATED COUNT: 13.8 % (ref 11.5–14.5)
FERRITIN SERPL-MCNC: 415 NG/ML (ref 20–300)
HCT VFR BLD AUTO: 35.1 % (ref 37–48.5)
HGB BLD-MCNC: 11.5 G/DL (ref 12–16)
IRON SERPL-MCNC: 85 UG/DL (ref 30–160)
LYMPHOCYTES # BLD AUTO: 1.4 K/UL (ref 1–4.8)
LYMPHOCYTES NFR BLD: 24.8 % (ref 18–48)
MCH RBC QN AUTO: 34.7 PG (ref 27–31)
MCHC RBC AUTO-ENTMCNC: 32.8 G/DL (ref 32–36)
MCV RBC AUTO: 106 FL (ref 82–98)
MONOCYTES # BLD AUTO: 0.2 K/UL (ref 0.3–1)
MONOCYTES NFR BLD: 4.1 % (ref 4–15)
NEUTROPHILS # BLD AUTO: 3.9 K/UL (ref 1.8–7.7)
NEUTROPHILS NFR BLD: 70.2 % (ref 38–73)
PLATELET # BLD AUTO: 286 K/UL (ref 150–350)
PMV BLD AUTO: 8.7 FL (ref 9.2–12.9)
RBC # BLD AUTO: 3.31 M/UL (ref 4–5.4)
SATURATED IRON: 26 % (ref 20–50)
TOTAL IRON BINDING CAPACITY: 333 UG/DL (ref 250–450)
TRANSFERRIN SERPL-MCNC: 225 MG/DL (ref 200–375)
WBC # BLD AUTO: 5.56 K/UL (ref 3.9–12.7)

## 2019-05-17 PROCEDURE — 85025 COMPLETE CBC W/AUTO DIFF WBC: CPT | Mod: HCNC

## 2019-05-17 PROCEDURE — 83540 ASSAY OF IRON: CPT | Mod: HCNC

## 2019-05-17 PROCEDURE — 36415 COLL VENOUS BLD VENIPUNCTURE: CPT | Mod: HCNC

## 2019-05-17 PROCEDURE — 82728 ASSAY OF FERRITIN: CPT | Mod: HCNC

## 2019-05-20 ENCOUNTER — CLINICAL SUPPORT (OUTPATIENT)
Dept: REHABILITATION | Facility: HOSPITAL | Age: 74
End: 2019-05-20
Attending: NEUROLOGICAL SURGERY
Payer: MEDICARE

## 2019-05-20 DIAGNOSIS — M62.81 MUSCLE WEAKNESS OF LOWER EXTREMITY: ICD-10-CM

## 2019-05-20 DIAGNOSIS — Z74.09 IMPAIRED FUNCTIONAL MOBILITY, BALANCE, GAIT, AND ENDURANCE: ICD-10-CM

## 2019-05-20 DIAGNOSIS — R29.3 POOR POSTURE: ICD-10-CM

## 2019-05-20 PROCEDURE — 97110 THERAPEUTIC EXERCISES: CPT | Mod: HCNC,PN

## 2019-05-20 PROCEDURE — G8979 MOBILITY GOAL STATUS: HCPCS | Mod: CK,HCNC,PN

## 2019-05-20 PROCEDURE — G8978 MOBILITY CURRENT STATUS: HCPCS | Mod: CL,HCNC,PN

## 2019-05-20 NOTE — PROGRESS NOTES
Physical Therapy Daily Treatment Note     Name: Regino Lawrence  Clinic Number: [id number]    Therapy Diagnosis:   Encounter Diagnoses   Name Primary?    Muscle weakness of lower extremity     Poor posture     Impaired functional mobility, balance, gait, and endurance      Physician: Fab Conner MD    Visit Date: 5/20/2019    Physician Orders: PT Eval and Treat - fall prevention  Medical Diagnosis from Referral: Lumbar disc herniation with radiculopathy  Evaluation Date: 3/11/2019  Last PN: 5/20/19 (visit 5)  Authorization Period Expiration: 12/31/19  Plan of Care Expiration: 6/11/19  Visit # / Visits authorized: 5/ 20    Time In: 1100  Time Out: 1156  Total Billable Time: 28 minutes    Precautions: Diabetes, Fall and osteoporosis, B foot neuropathy, uncontrolled BP, CKD    Subjective     Pt reports she feels her shot in her L hip has worn off and she is in great pain that is effecting her sleep. Symptoms got worse last week and wasn't able to attend PT due to pain when walking. Pain is at worst 10/10. She had great improvements in ability to walk since start of care but feels she back at starting point due to L hip exacerbation.   She was compliant with home exercise program.  Response to previous treatment: good  Functional change: able to walk with no pain prior to recent exacerbation but now unable to walk and sleep without pain    Pain: 10/10  Location: left hip and low back    Objective     Taken 4/22/19:  Ely's test: B 110 deg    BOLD= performed today    Regino received therapeutic exercises to develop strength, endurance, ROM, flexibility, posture and core stabilization for 56 minutes including:    Nustep x 6' level 1  +Bridges x 10  +SL clams x 20 ea  +LTR 5 sec hold x 20 ea  Prone quad stretch w strap 3 x 30 sec ea  Standing Calf stretch 3 x 20''   Standing hip abd 2 x 10 ea  Standing marching 2 x 10 ea  Standing heel raises 2 x 10  Mini squats x 20  Glute sets 20 x 5'' hold      Regino  participated in dynamic functional therapeutic activities to improve functional performance for 0 minutes, including:    Sit to stand from std chair w 1 hand to stand and 0 UE to sit 2 x 10    Regino participated in gait training to improve functional mobility and safety for 0  minutes, including:    Regino received hot pack for 0 minutes to low back seated.     Home Exercises Provided and Patient Education Provided     Education provided:   - compliance with HEP.    Written Home Exercises Provided: Patient instructed to cont prior HEP.  Exercises were reviewed and Regino was able to demonstrate them prior to the end of the session.  Regino demonstrated good  understanding of the education provided.     See EMR under Patient Instructions for exercises provided this visit.     Functional Limitation Report- G-CODE:  CMS Impairment/Limitation/Restriction for FOTO Lumbar Spine Survey   Status Limitation G-Code CMS Severity Modifier  Intake 29% 71%  Predicted 44% 56% Goal Status+ CK - At least 40 percent but less than 60 percent  4/22/2019 37% 63%  5/20/2019 35% 65% Current Status CL - At least 60 percent but less than 80 percent  D/C Status CL **only report if this is discharge survey  +Based on FOTO predicted change score    Assessment     Regino presents to clinic with recent exacerbation of L hip and back pain. She was re-assessed today with 1/5 LTGs being met since last reassessment indicating improved B hip flexor/quad flexibility. Pt remarks that she was improving a lot in her walking, LE strength, and pain prior to recent episode of increased pain. She continues with L hip and back pain, LE weakness, postural imbalance, and impaired gait, endurance, and functional mobility. Pt could benefit from continued physical therapy services to address deficits.     She had good tolerance to treatment today with no adverse effects. Post-treatment L hip and back pain rated as 0/10. Pt unable to complete Nustep warm up  due to L hip pain. Some difficulty performing bridges due to back pain. She reports improvement in symptoms by end of session. Occasional need for rest breaks indicating decreased endurance.     Regino is  progressing well towards her goals.     Pt prognosis is Good.     Pt will continue to benefit from skilled outpatient physical therapy to address the deficits listed in the problem list box on initial evaluation, provide pt/family education and to maximize pt's level of independence in the home and community environment.     Pt's spiritual, cultural and educational needs considered and pt agreeable to plan of care and goals.     Anticipated barriers to physical therapy: pain limited and CHF     GOALS: Short Term Goals:  6 weeks  1. Report decreased low back pain  <  / =  5/10 at worst to increase tolerance for prolonged standing.- met 4/22/19   2. Pt will be able to tolerate multi-directional LE strengthening in order to improve ability to perform household chores.- met 4/22/19   3. Pt will report 50% improvement in ability to walk long distances since start of care to indicate improved functional mobility.- met 4/22/19    4. Pt will increase B Ely's test to 100 deg to indicate improved hip flexor/quad flexibility and posture.- met 4/22/19   5. Pt to tolerate HEP to improve ROM and independence with ADL's.- met 4/22/19      Long Term Goals: 12 weeks  1. Report decreased low back pain  <  / =  3/10 at worst to increase tolerance for prolonged standing.- in progress   2. Pt will be able to perform 2 x 10 multi-directional LE strengthening without fatigue in order to improve ability to perform household chores.- in progress   3. Pt will report 80% improvement in ability to walk long distances since start of care to indicate improved functional mobility.- in progress    4. Pt will increase B Ely's test to 110 deg to indicate improved hip flexor/quad flexibility and posture.- met 5/20/19  5. Pt to be Independent with  HEP to improve ROM and independence with ADL's.- in progress     Plan     Progress LE strengthening and flexibility/posture.     Jessie Fox, PT

## 2019-05-21 ENCOUNTER — PATIENT MESSAGE (OUTPATIENT)
Dept: SPINE | Facility: CLINIC | Age: 74
End: 2019-05-21

## 2019-05-22 ENCOUNTER — OFFICE VISIT (OUTPATIENT)
Dept: PHYSICAL MEDICINE AND REHAB | Facility: CLINIC | Age: 74
End: 2019-05-22
Payer: MEDICARE

## 2019-05-22 VITALS
BODY MASS INDEX: 25.11 KG/M2 | SYSTOLIC BLOOD PRESSURE: 168 MMHG | WEIGHT: 127.88 LBS | DIASTOLIC BLOOD PRESSURE: 84 MMHG | HEART RATE: 102 BPM | HEIGHT: 60 IN

## 2019-05-22 DIAGNOSIS — E11.42 DIABETIC PERIPHERAL NEUROPATHY: ICD-10-CM

## 2019-05-22 DIAGNOSIS — R06.09 DOE (DYSPNEA ON EXERTION): ICD-10-CM

## 2019-05-22 DIAGNOSIS — M54.42 CHRONIC MIDLINE LOW BACK PAIN WITH LEFT-SIDED SCIATICA: ICD-10-CM

## 2019-05-22 DIAGNOSIS — G89.29 CHRONIC PAIN OF BOTH SHOULDERS: ICD-10-CM

## 2019-05-22 DIAGNOSIS — G72.9 MYOPATHY: ICD-10-CM

## 2019-05-22 DIAGNOSIS — M25.511 CHRONIC PAIN OF BOTH SHOULDERS: ICD-10-CM

## 2019-05-22 DIAGNOSIS — M17.0 PRIMARY OSTEOARTHRITIS OF BOTH KNEES: ICD-10-CM

## 2019-05-22 DIAGNOSIS — D86.9 SARCOIDOSIS: Chronic | ICD-10-CM

## 2019-05-22 DIAGNOSIS — R26.9 GAIT DISORDER: Primary | ICD-10-CM

## 2019-05-22 DIAGNOSIS — M85.80 OSTEOPENIA, UNSPECIFIED LOCATION: ICD-10-CM

## 2019-05-22 DIAGNOSIS — G89.29 CHRONIC MIDLINE LOW BACK PAIN WITH LEFT-SIDED SCIATICA: ICD-10-CM

## 2019-05-22 DIAGNOSIS — R29.6 FREQUENT FALLS: ICD-10-CM

## 2019-05-22 DIAGNOSIS — M70.62 TROCHANTERIC BURSITIS OF LEFT HIP: ICD-10-CM

## 2019-05-22 DIAGNOSIS — M25.512 CHRONIC PAIN OF BOTH SHOULDERS: ICD-10-CM

## 2019-05-22 PROCEDURE — 1101F PR PT FALLS ASSESS DOC 0-1 FALLS W/OUT INJ PAST YR: ICD-10-PCS | Mod: HCNC,CPTII,S$GLB, | Performed by: PHYSICAL MEDICINE & REHABILITATION

## 2019-05-22 PROCEDURE — 99214 OFFICE O/P EST MOD 30 MIN: CPT | Mod: HCNC,S$GLB,, | Performed by: PHYSICAL MEDICINE & REHABILITATION

## 2019-05-22 PROCEDURE — 99999 PR PBB SHADOW E&M-EST. PATIENT-LVL II: CPT | Mod: PBBFAC,HCNC,, | Performed by: PHYSICAL MEDICINE & REHABILITATION

## 2019-05-22 PROCEDURE — 99214 PR OFFICE/OUTPT VISIT, EST, LEVL IV, 30-39 MIN: ICD-10-PCS | Mod: HCNC,S$GLB,, | Performed by: PHYSICAL MEDICINE & REHABILITATION

## 2019-05-22 PROCEDURE — 3044F PR MOST RECENT HEMOGLOBIN A1C LEVEL <7.0%: ICD-10-PCS | Mod: HCNC,CPTII,S$GLB, | Performed by: PHYSICAL MEDICINE & REHABILITATION

## 2019-05-22 PROCEDURE — 3079F DIAST BP 80-89 MM HG: CPT | Mod: HCNC,CPTII,S$GLB, | Performed by: PHYSICAL MEDICINE & REHABILITATION

## 2019-05-22 PROCEDURE — 3079F PR MOST RECENT DIASTOLIC BLOOD PRESSURE 80-89 MM HG: ICD-10-PCS | Mod: HCNC,CPTII,S$GLB, | Performed by: PHYSICAL MEDICINE & REHABILITATION

## 2019-05-22 PROCEDURE — 3077F PR MOST RECENT SYSTOLIC BLOOD PRESSURE >= 140 MM HG: ICD-10-PCS | Mod: HCNC,CPTII,S$GLB, | Performed by: PHYSICAL MEDICINE & REHABILITATION

## 2019-05-22 PROCEDURE — 3077F SYST BP >= 140 MM HG: CPT | Mod: HCNC,CPTII,S$GLB, | Performed by: PHYSICAL MEDICINE & REHABILITATION

## 2019-05-22 PROCEDURE — 1101F PT FALLS ASSESS-DOCD LE1/YR: CPT | Mod: HCNC,CPTII,S$GLB, | Performed by: PHYSICAL MEDICINE & REHABILITATION

## 2019-05-22 PROCEDURE — 3044F HG A1C LEVEL LT 7.0%: CPT | Mod: HCNC,CPTII,S$GLB, | Performed by: PHYSICAL MEDICINE & REHABILITATION

## 2019-05-22 PROCEDURE — 99999 PR PBB SHADOW E&M-EST. PATIENT-LVL II: ICD-10-PCS | Mod: PBBFAC,HCNC,, | Performed by: PHYSICAL MEDICINE & REHABILITATION

## 2019-05-22 NOTE — LETTER
May 22, 2019        Leti Ruggiero MD  [address]  [facility] [address]           [facility]  [address]  [facility] [address]  Phone: [phone]          Patient: Regino Lawrence   MR Number: [id number]   YOB: 1945   Date of Visit: 5/22/2019       Dear Dr. Leti Ruggiero:    Thank you for referring Regino Lawrence to me for evaluation. Attached you will find relevant portions of my assessment and plan of care.    If you have questions, please do not hesitate to call me. I look forward to following Regino Lawrence along with you.    Sincerely,    Tian Mitchell MD    Enclosure  CC:  No Recipients    If you would like to receive this communication electronically, please contact [email or web address] or [phone] to request more information on [employer] access.    For providers and/or their staff who would like to refer a patient to [facility], please contact us through our one-stop-shop provider referral line, [facility], at [phone].    If you feel you have received this communication in error or would no longer like to receive these types of communications, please e-mail [email or web address]

## 2019-05-22 NOTE — PROGRESS NOTES
Subjective:       Patient ID: Regino Lawrence is a 73 y.o. female.    Chief Complaint: No chief complaint on file.    HPI     HISTORY OF PRESENT ILLNESS:  Ms. Lawrence is a 73-year-old black female with   multiple medical problems, who is presenting to the Physical Medicine Clinic for   evaluation for a power mobility device.  Her past medical history is   significant for hypertension, diabetes mellitus, peripheral neuropathy,   sarcoidosis, myopathy, chronic neck pain, status post T3-T7 laminectomy and   fusion in 2015, chronic low back pain with left lumbar radiculopathy, status   post multiple epidural steroid injections, and a possible candidate for surgery,   bilateral shoulder pain, worse on the left, status post steroid injections, OA   of the knees, and osteopenia.    The patient lives with her daughter in a single-william home with ramp access.    She is independent with feeding herself, but cannot prepare the meals.  She   requires assistance for dressing.  She is independent with toileting and   bathing, but needs occasional assistance and supervision.  She ambulates with a   straight cane or a Rollator walker for about 15 feet before she has to stop.    She is restricted by low back pain (up to 10/10), leg weakness, bilateral knee   pain (up to 10/10), and shortness of breath.  She has a manual wheelchair, but   cannot propel it due to upper extremity weakness, and bilateral shoulder pain.    The patient reports history of falls, sometimes twice per month, and so far   without any serious sequelae.  She was able to try a scooter in the department   store and was able to transfer on to it and maneuver it in the store without   significant problems.      MS/HN  dd: 05/22/2019 14:34:10 (CDT)  td: 05/23/2019 01:07:01 (CDT)  Doc ID   #[id number]  Job ID #[id number]    CC:       Review of Systems   Constitutional: Positive for fatigue.   Eyes: Positive for visual disturbance.   Respiratory: Positive for shortness of  breath.    Cardiovascular: Negative for chest pain.   Gastrointestinal: Negative for nausea and vomiting.   Genitourinary: Negative for difficulty urinating.   Musculoskeletal: Positive for arthralgias, back pain, gait problem and neck pain.   Neurological: Negative for dizziness and headaches.   Psychiatric/Behavioral: Negative for behavioral problems.       Objective:      Physical Exam   Constitutional: She is oriented to person, place, and time. She appears well-developed and well-nourished. No distress.   HENT:   Head: Normocephalic and atraumatic.   Neck:   Healed posterior surgical scar.  Decreased ROM.  Pain at end range.  +ve moderate tenderness.   Cardiovascular: Normal rate, regular rhythm and normal heart sounds.   Pulmonary/Chest: Effort normal and breath sounds normal.   Abdominal: Soft.   Musculoskeletal:   BUE:  ROM:   RUE: full.   LUE: decreased AROM at shoulder.  Strength:    RUE: 3+/5 at shoulder abduction, 4- elbow flexion, 4- elbow extension, 4- hand .   LUE: 3-/5 at shoulder abduction, 4- elbow flexion, 4- elbow extension, 4- hand .  Sensation to pinprick:   RUE: intact.   LUE: intact.  DTR:    RUE: +1 biceps, +1 triceps.   LUE:  +1 biceps, +1 triceps.      BLE:  ROM:   RLE: full.   LLE: full.  Knees:   RLE: +ve crepitus.    LLE: +ve crepitus.  Strength:    RLE: 3+/5 at hip flexion, 4 knee extension, 4 ankle DF, 4 ankle PF.   LLE: 3+/5 at hip flexion, 4 knee extension, 4 ankle DF,  4 ankle PF.  Sensation to pinprick:     RLE: intact.      LLE: intact.   DTR:     RLE: +1 knee, +1 ankle.    LLE: +1 knee, +1 ankle.  SLR (sitting):      RLE: -ve.      LLE: +ve.     +ve moderate tenderness over lumbar spine.    Gait: slow kristen, stooped posture, using a rollator.   Neurological: She is alert and oriented to person, place, and time.   Skin: Skin is warm.   Psychiatric: She has a normal mood and affect.   Vitals reviewed.      Assessment:       1. Gait disorder    2. Sarcoidosis    3. FAUSTIN  (dyspnea on exertion)    4. Myopathy    5. Diabetic peripheral neuropathy    6. Chronic midline low back pain with left-sided sciatica    7. Trochanteric bursitis of left hip    8. Chronic pain of both shoulders    9. Frequent falls    10. Osteopenia, unspecified location        Summary/Plan:           - The patient was seen today for mobility evaluation for a power mobility device due to significant impairment at home.  - The patient has multifactorial gait impairment.  - The patient is not able to ambulate safely to the kitchen or living room.  - The patient is unable to use a walker functional distances due to  FAUSTIN b/o sarcoidosis, BLE weakness b/o mypoathy, chronic LBP with Lt sciatica b/o DJD, bilateral knee pain b/o OA, Lt hip pain b/o trochanteric bursitis.  - The patient is unable to use an optimally-configured manual wheelchair at home due to FAUSTIN b/o sarcoidosis, BUE weakness b/o mypoathy, bilateral shoulder pain b/o OA and bursitis.  - The patient has history of falls, which could be detrimental to he health b/o osteopenia.  - The patient has intact cognition and should be able to use a power mobility device well at home. She was able to use one in the past.  - The patient was given a prescription for an electric scooter.  - The patient has enough upper & lower extremity strength to be able to transfer to and from the power mobility device.  - The patient has enough range of motion & strength in BUE to allow functional operation of the tiller.  - The patient has good trunk balance and should be able to maintain a safe posture while operating a power mobility device.  - This will allow the patient to go safely to the kitchen, dining room or living room for feeding & socialization.   - The patient is to return the Physical Medicine/Mobility clinic prn.

## 2019-05-23 ENCOUNTER — OFFICE VISIT (OUTPATIENT)
Dept: RHEUMATOLOGY | Facility: CLINIC | Age: 74
End: 2019-05-23
Payer: MEDICARE

## 2019-05-23 ENCOUNTER — PATIENT MESSAGE (OUTPATIENT)
Dept: RHEUMATOLOGY | Facility: CLINIC | Age: 74
End: 2019-05-23

## 2019-05-23 VITALS
BODY MASS INDEX: 25.58 KG/M2 | HEIGHT: 60 IN | DIASTOLIC BLOOD PRESSURE: 89 MMHG | WEIGHT: 130.31 LBS | SYSTOLIC BLOOD PRESSURE: 149 MMHG | HEART RATE: 86 BPM

## 2019-05-23 DIAGNOSIS — D86.9 SARCOIDOSIS: Primary | Chronic | ICD-10-CM

## 2019-05-23 DIAGNOSIS — R53.83 FATIGUE, UNSPECIFIED TYPE: ICD-10-CM

## 2019-05-23 DIAGNOSIS — D84.9 IMMUNOSUPPRESSION: Chronic | ICD-10-CM

## 2019-05-23 DIAGNOSIS — G72.9 MYOPATHY: ICD-10-CM

## 2019-05-23 DIAGNOSIS — E11.22 DIABETES MELLITUS WITH STAGE 3 CHRONIC KIDNEY DISEASE: Chronic | ICD-10-CM

## 2019-05-23 DIAGNOSIS — N18.30 DIABETES MELLITUS WITH STAGE 3 CHRONIC KIDNEY DISEASE: Chronic | ICD-10-CM

## 2019-05-23 PROCEDURE — 3077F SYST BP >= 140 MM HG: CPT | Mod: HCNC,CPTII,S$GLB, | Performed by: INTERNAL MEDICINE

## 2019-05-23 PROCEDURE — 1101F PR PT FALLS ASSESS DOC 0-1 FALLS W/OUT INJ PAST YR: ICD-10-PCS | Mod: HCNC,CPTII,S$GLB, | Performed by: INTERNAL MEDICINE

## 2019-05-23 PROCEDURE — 3079F DIAST BP 80-89 MM HG: CPT | Mod: HCNC,CPTII,S$GLB, | Performed by: INTERNAL MEDICINE

## 2019-05-23 PROCEDURE — 3079F PR MOST RECENT DIASTOLIC BLOOD PRESSURE 80-89 MM HG: ICD-10-PCS | Mod: HCNC,CPTII,S$GLB, | Performed by: INTERNAL MEDICINE

## 2019-05-23 PROCEDURE — 3077F PR MOST RECENT SYSTOLIC BLOOD PRESSURE >= 140 MM HG: ICD-10-PCS | Mod: HCNC,CPTII,S$GLB, | Performed by: INTERNAL MEDICINE

## 2019-05-23 PROCEDURE — 3044F PR MOST RECENT HEMOGLOBIN A1C LEVEL <7.0%: ICD-10-PCS | Mod: HCNC,CPTII,S$GLB, | Performed by: INTERNAL MEDICINE

## 2019-05-23 PROCEDURE — 99214 PR OFFICE/OUTPT VISIT, EST, LEVL IV, 30-39 MIN: ICD-10-PCS | Mod: HCNC,25,S$GLB, | Performed by: INTERNAL MEDICINE

## 2019-05-23 PROCEDURE — 96372 THER/PROPH/DIAG INJ SC/IM: CPT | Mod: HCNC,S$GLB,, | Performed by: INTERNAL MEDICINE

## 2019-05-23 PROCEDURE — 96372 PR INJECTION,THERAP/PROPH/DIAG2ST, IM OR SUBCUT: ICD-10-PCS | Mod: HCNC,S$GLB,, | Performed by: INTERNAL MEDICINE

## 2019-05-23 PROCEDURE — 99999 PR PBB SHADOW E&M-EST. PATIENT-LVL III: CPT | Mod: PBBFAC,HCNC,, | Performed by: INTERNAL MEDICINE

## 2019-05-23 PROCEDURE — 1101F PT FALLS ASSESS-DOCD LE1/YR: CPT | Mod: HCNC,CPTII,S$GLB, | Performed by: INTERNAL MEDICINE

## 2019-05-23 PROCEDURE — 3044F HG A1C LEVEL LT 7.0%: CPT | Mod: HCNC,CPTII,S$GLB, | Performed by: INTERNAL MEDICINE

## 2019-05-23 PROCEDURE — 99214 OFFICE O/P EST MOD 30 MIN: CPT | Mod: HCNC,25,S$GLB, | Performed by: INTERNAL MEDICINE

## 2019-05-23 PROCEDURE — 99999 PR PBB SHADOW E&M-EST. PATIENT-LVL III: ICD-10-PCS | Mod: PBBFAC,HCNC,, | Performed by: INTERNAL MEDICINE

## 2019-05-23 RX ORDER — TRIAMCINOLONE ACETONIDE 40 MG/ML
80 INJECTION, SUSPENSION INTRA-ARTICULAR; INTRAMUSCULAR ONCE
Status: COMPLETED | OUTPATIENT
Start: 2019-05-23 | End: 2019-05-23

## 2019-05-23 RX ADMIN — TRIAMCINOLONE ACETONIDE 80 MG: 40 INJECTION, SUSPENSION INTRA-ARTICULAR; INTRAMUSCULAR at 12:05

## 2019-05-23 ASSESSMENT — ROUTINE ASSESSMENT OF PATIENT INDEX DATA (RAPID3)
PATIENT GLOBAL ASSESSMENT SCORE: 10
AM STIFFNESS SCORE: 1, YES
PSYCHOLOGICAL DISTRESS SCORE: 2.2
WHEN YOU AWAKENED IN THE MORNING OVER THE LAST WEEK, PLEASE INDICATE THE AMOUNT OF TIME IT TAKES UNTIL YOU ARE AS LIMBER AS YOU WILL BE FOR THE DAY: 10 MINUTES
FATIGUE SCORE: 10
PAIN SCORE: 10
TOTAL RAPID3 SCORE: 7.67
MDHAQ FUNCTION SCORE: .9

## 2019-05-23 NOTE — PROGRESS NOTES
Subjective:       Patient ID: Regino Lawrence is a 73 y.o. female.    Chief Complaint: Sarcoidosis    HPI:  Regino Lawrence is a 73 y.o. female with history of sarcoidosis with associated myopathy and   arthropathy. Sarcoidosis that was first manifested by muscle inflammation, low white   blood cell count, hair loss, skin involvement. She was treated in the   past with methotrexate and Plaquenil, both of which were ineffective.   Cellcept and Imuran caused some unknown side effect (she thinks it made her sick).   Colchicine was held due to low WBC.   Although methotrexate did not help in past it was retried and she felt it helped hair growth but did not help body aches.   She held MTX due to an URI but patient has not wanted restarted since then (2013).   Leflunomide was held due to elevated BP after less than a week of use.    Interval History:     Bilateral hip bursa injection in March wore off end of April.    Has lumbar spine injection for disc disease.    She was told she was too weak for surgery so given shots.   She had evaluation for scooter.   Hands continue to swell periodically.    Pain >10/10 ache in hips, left leg, hands and back.    Pain medication helps some.  Activity worsens.        Currently on prednisone 5 mg.  Currently on MTX 6 tabs.      Review of Systems   Constitutional: Positive for fatigue.   Respiratory: Positive for shortness of breath.    Cardiovascular: Negative.    Gastrointestinal: Negative.    Endocrine: Negative.    Genitourinary: Negative.    Musculoskeletal: Positive for arthralgias and back pain.   Skin: Negative.    Allergic/Immunologic: Negative.    Neurological: Negative.    Hematological: Negative.    Psychiatric/Behavioral: Negative.          Objective:   BP (!) 149/89   Pulse 86   Ht 5' (1.524 m)   Wt 59.1 kg (130 lb 4.7 oz)   LMP  (LMP Unknown)   BMI 25.45 kg/m²      Physical Exam   Constitutional: She is oriented to person, place, and time and well-developed,  well-nourished, and in no distress.   HENT:   Head: Normocephalic and atraumatic.   Eyes: Conjunctivae and EOM are normal.   Cardiovascular: Normal rate, regular rhythm and normal heart sounds.    Pulmonary/Chest: Effort normal and breath sounds normal.   Abdominal: Soft. Bowel sounds are normal.   Neurological: She is alert and oriented to person, place, and time.   Slow careful gait.  Walker in room   Skin: Skin is warm and dry.     Psychiatric: Mood and affect normal.   Musculoskeletal:   4.5/5 UE and LE proximal strength bilaterally  28 joint count: 10 tender and 10 swollen.  Swollen and tender MCPs  (Unchanged)           LABS    Component      Latest Ref Rng & Units 5/17/2019 5/3/2019 2/25/2019   WBC      3.90 - 12.70 K/uL 5.56 5.46    RBC      4.00 - 5.40 M/uL 3.31 (L) 3.11 (L)    Hemoglobin      12.0 - 16.0 g/dL 11.5 (L) 10.8 (L)    Hematocrit      37.0 - 48.5 % 35.1 (L) 32.8 (L)    MCV      82 - 98 fL 106 (H) 106 (H)    MCH      27.0 - 31.0 pg 34.7 (H) 34.7 (H)    MCHC      32.0 - 36.0 g/dL 32.8 32.9    RDW      11.5 - 14.5 % 13.8 13.7    Platelets      150 - 350 K/uL 286 285    MPV      9.2 - 12.9 fL 8.7 (L) 8.3 (L)    Gran # (ANC)      1.8 - 7.7 K/uL 3.9 3.6    Lymph #      1.0 - 4.8 K/uL 1.4 1.5    Mono #      0.3 - 1.0 K/uL 0.2 (L) 0.3    Eos #      0.0 - 0.5 K/uL 0.0 0.1    Baso #      0.00 - 0.20 K/uL 0.01 0.01    Gran%      38.0 - 73.0 % 70.2 66.1    Lymph%      18.0 - 48.0 % 24.8 27.1    Mono%      4.0 - 15.0 % 4.1 5.5    Eosinophil%      0.0 - 8.0 % 0.7 1.1    Basophil%      0.0 - 1.9 % 0.2 0.2    Differential Method       Automated Automated    Sodium      136 - 145 mmol/L   137   Potassium      3.5 - 5.1 mmol/L   3.7   Chloride      95 - 110 mmol/L   100   CO2      23 - 29 mmol/L   26   Glucose      70 - 110 mg/dL   188 (H)   BUN, Bld      8 - 23 mg/dL   17   Creatinine      0.5 - 1.4 mg/dL   1.0   Calcium      8.7 - 10.5 mg/dL   9.5   PROTEIN TOTAL      6.0 - 8.4 g/dL   6.3   Albumin      3.5 -  5.2 g/dL   3.5   BILIRUBIN TOTAL      0.1 - 1.0 mg/dL   0.3   Alkaline Phosphatase      55 - 135 U/L   58   AST      10 - 40 U/L   12   ALT      10 - 44 U/L   10   Anion Gap      8 - 16 mmol/L   11   eGFR if African American      >60 mL/min/1.73 m:2   >60.0   eGFR if non African American      >60 mL/min/1.73 m:2   56.0 (A)   Iron      30 - 160 ug/dL 85     Transferrin      200 - 375 mg/dL 225     TIBC      250 - 450 ug/dL 333     Saturated Iron      20 - 50 % 26     Aldolase      1.2 - 7.6 U/L   2.1   CPK      20 - 180 U/L   146   CRP      0.0 - 8.2 mg/L   3.2   Sed Rate      0 - 36 mm/Hr   6   Ferritin      20.0 - 300.0 ng/mL 415 (H)            Assessment:       1.   Sarcoidosis. Manifested by myopathy and arthropathy. Persistent joint pain and myalgias despite prednisone 10 mg.  Now with diffuse body pain    2.   Myalgia and myositis.    3.   Osteopenia. Took Fosamax for 5 years stopped 6/2013.  Awaiting patient decision on Prolia after she sees dentis.    4.   Fatigue     5.   Diabetes mellitus type 2 in nonobese.  Last      6.           Neck pain. X-ray with degenerative changes. S/p laminectomy-cervical fusion C3-C7 11/16/2015 for cervical spinal stenosis.    7.           Back pain    8.           HTN.  9.           SOB.  When blood count low  10.         Anemia.  On Procrit    Plan:       1. Labs   2. Kenalog 80 mg IM.  Continue prednisone 5 mg daily daily.  Consider increasing methotrexate to 6 tabs.  Continue folic acid.   Repeat labs today and one month if adjusting MTX.  3. [employer] stopped tramadol.  Still on hydrocodone/acetaminophen from primary doctor.  [employer] now stopped Flexeril.  Will switch to tizanidine.  Risk of sedation discussed.  Patient to try 2 mg first and contact office.    4. Following with nephrology  5. DEXA with osteopenia of hip total and femoral neck. FRAX does not suggest treatment however with prednisone>7.5 mg will consider Prolia (due renal insufficiency).  Information  provided for patient to review.  She read information but did not see dentist to have teeth pulled.  6.  Follow with Dr. Conner regarding spine issues.                 RTO in 3-4 months.    Patient seen face to face for 25 minutes and greater than 50% spent in counseling regarding Joint pains,   management of sarcoidosis and pain.

## 2019-05-24 DIAGNOSIS — G72.9 MYOPATHY: ICD-10-CM

## 2019-05-24 DIAGNOSIS — D86.9 SARCOIDOSIS: Chronic | ICD-10-CM

## 2019-05-24 RX ORDER — FOLIC ACID 1 MG/1
TABLET ORAL
Qty: 90 TABLET | Refills: 0 | Status: SHIPPED | OUTPATIENT
Start: 2019-05-24 | End: 2019-09-05 | Stop reason: SDUPTHER

## 2019-05-28 ENCOUNTER — PATIENT MESSAGE (OUTPATIENT)
Dept: FAMILY MEDICINE | Facility: CLINIC | Age: 74
End: 2019-05-28

## 2019-05-28 DIAGNOSIS — M51.36 DEGENERATIVE DISC DISEASE, LUMBAR: ICD-10-CM

## 2019-05-28 RX ORDER — HYDROCODONE BITARTRATE AND ACETAMINOPHEN 5; 325 MG/1; MG/1
1 TABLET ORAL EVERY 6 HOURS PRN
Qty: 90 TABLET | Refills: 0 | Status: SHIPPED | OUTPATIENT
Start: 2019-05-28 | End: 2019-07-05 | Stop reason: SDUPTHER

## 2019-05-31 ENCOUNTER — LAB VISIT (OUTPATIENT)
Dept: LAB | Facility: HOSPITAL | Age: 74
End: 2019-05-31
Attending: INTERNAL MEDICINE
Payer: MEDICARE

## 2019-05-31 DIAGNOSIS — N18.9 ANEMIA IN CHRONIC KIDNEY DISEASE, UNSPECIFIED CKD STAGE: ICD-10-CM

## 2019-05-31 DIAGNOSIS — D63.1 ANEMIA IN CHRONIC KIDNEY DISEASE, UNSPECIFIED CKD STAGE: ICD-10-CM

## 2019-05-31 LAB
BASOPHILS # BLD AUTO: 0.01 K/UL (ref 0–0.2)
BASOPHILS NFR BLD: 0.2 % (ref 0–1.9)
DIFFERENTIAL METHOD: ABNORMAL
EOSINOPHIL # BLD AUTO: 0 K/UL (ref 0–0.5)
EOSINOPHIL NFR BLD: 0.5 % (ref 0–8)
ERYTHROCYTE [DISTWIDTH] IN BLOOD BY AUTOMATED COUNT: 13.4 % (ref 11.5–14.5)
HCT VFR BLD AUTO: 33.5 % (ref 37–48.5)
HGB BLD-MCNC: 11 G/DL (ref 12–16)
LYMPHOCYTES # BLD AUTO: 1.7 K/UL (ref 1–4.8)
LYMPHOCYTES NFR BLD: 28.6 % (ref 18–48)
MCH RBC QN AUTO: 34.3 PG (ref 27–31)
MCHC RBC AUTO-ENTMCNC: 32.8 G/DL (ref 32–36)
MCV RBC AUTO: 104 FL (ref 82–98)
MONOCYTES # BLD AUTO: 0.3 K/UL (ref 0.3–1)
MONOCYTES NFR BLD: 4.6 % (ref 4–15)
NEUTROPHILS # BLD AUTO: 3.9 K/UL (ref 1.8–7.7)
NEUTROPHILS NFR BLD: 66.1 % (ref 38–73)
PLATELET # BLD AUTO: 268 K/UL (ref 150–350)
PMV BLD AUTO: 8.9 FL (ref 9.2–12.9)
RBC # BLD AUTO: 3.21 M/UL (ref 4–5.4)
WBC # BLD AUTO: 5.83 K/UL (ref 3.9–12.7)

## 2019-05-31 PROCEDURE — 85025 COMPLETE CBC W/AUTO DIFF WBC: CPT | Mod: HCNC

## 2019-05-31 PROCEDURE — 36415 COLL VENOUS BLD VENIPUNCTURE: CPT | Mod: HCNC

## 2019-06-03 ENCOUNTER — OFFICE VISIT (OUTPATIENT)
Dept: OPHTHALMOLOGY | Facility: CLINIC | Age: 74
End: 2019-06-03
Payer: MEDICARE

## 2019-06-03 DIAGNOSIS — H34.8120 HEMISPHERIC RETINAL VEIN OCCLUSION WITH MACULAR EDEMA OF LEFT EYE: Primary | ICD-10-CM

## 2019-06-03 DIAGNOSIS — H52.7 REFRACTIVE ERROR: ICD-10-CM

## 2019-06-03 DIAGNOSIS — H17.9 CORNEAL SCAR, RIGHT EYE: ICD-10-CM

## 2019-06-03 DIAGNOSIS — H04.123 DRY EYE SYNDROME, BILATERAL: ICD-10-CM

## 2019-06-03 DIAGNOSIS — Z96.1 PSEUDOPHAKIA: ICD-10-CM

## 2019-06-03 PROCEDURE — 92014 PR EYE EXAM, EST PATIENT,COMPREHESV: ICD-10-PCS | Mod: HCNC,S$GLB,, | Performed by: OPHTHALMOLOGY

## 2019-06-03 PROCEDURE — 92014 COMPRE OPH EXAM EST PT 1/>: CPT | Mod: HCNC,S$GLB,, | Performed by: OPHTHALMOLOGY

## 2019-06-03 PROCEDURE — 99999 PR PBB SHADOW E&M-EST. PATIENT-LVL II: CPT | Mod: PBBFAC,HCNC,, | Performed by: OPHTHALMOLOGY

## 2019-06-03 PROCEDURE — 99999 PR PBB SHADOW E&M-EST. PATIENT-LVL II: ICD-10-PCS | Mod: PBBFAC,HCNC,, | Performed by: OPHTHALMOLOGY

## 2019-06-03 NOTE — PROGRESS NOTES
Subjective:       Patient ID: Regino Lawrence is a 73 y.o. female.    Chief Complaint: Blurred Vision and Diabetic Eye Exam    HPI     73  Y.o. Female is here for Blurred vision at distance and near with   correction.Glasses were made prior to Cataract Sx. Eyes itch, burn and   tear. Denies eye pain and f/f. No problems with glare. Pt c/o being dizzy.     Eye Meds: AT's prn OU     Last edited by MARA Ford on 6/3/2019 10:08 AM. (History)             Assessment:       1. Hemispheric retinal vein occlusion with macular edema of left eye    2. Corneal scar, right eye    3. Dry eye syndrome, bilateral    4. Refractive error    5. Pseudophakia        Plan:       Hemispheric retinal vein occlusion with ME OS-Needs Retina eval/tx.  K scar OD-Stable.  JOSELINE-Doing well.  RE        Refer to Dr Baker for eval/tx OS.

## 2019-06-06 ENCOUNTER — TELEPHONE (OUTPATIENT)
Dept: OPHTHALMOLOGY | Facility: CLINIC | Age: 74
End: 2019-06-06

## 2019-06-06 NOTE — TELEPHONE ENCOUNTER
Spoke with pt advise pt to hold on glasses rx until she see Dr. Baker. Due to edema in the left eye MRx may not be accurate from Monday 6/03/2019.Once retina is stable advise pt to schdule appt with Dr. Azevedo for MRx. Inform pt to purchase +2.50 otc readers to help see up close.

## 2019-06-11 ENCOUNTER — PATIENT MESSAGE (OUTPATIENT)
Dept: RHEUMATOLOGY | Facility: CLINIC | Age: 74
End: 2019-06-11

## 2019-06-11 DIAGNOSIS — G72.9 MYOPATHY: ICD-10-CM

## 2019-06-11 DIAGNOSIS — D86.9 SARCOIDOSIS: Primary | Chronic | ICD-10-CM

## 2019-06-12 RX ORDER — PREDNISONE 5 MG/1
5 TABLET ORAL DAILY
Qty: 90 TABLET | Refills: 1 | Status: ON HOLD | OUTPATIENT
Start: 2019-06-12 | End: 2019-10-19 | Stop reason: SDUPTHER

## 2019-06-13 ENCOUNTER — OFFICE VISIT (OUTPATIENT)
Dept: PODIATRY | Facility: CLINIC | Age: 74
End: 2019-06-13
Payer: MEDICARE

## 2019-06-13 VITALS
HEIGHT: 60 IN | WEIGHT: 130 LBS | BODY MASS INDEX: 25.52 KG/M2 | DIASTOLIC BLOOD PRESSURE: 78 MMHG | SYSTOLIC BLOOD PRESSURE: 148 MMHG

## 2019-06-13 DIAGNOSIS — M25.572 ARTHRALGIA OF LEFT FOOT: ICD-10-CM

## 2019-06-13 DIAGNOSIS — M20.41 HAMMER TOES OF BOTH FEET: ICD-10-CM

## 2019-06-13 DIAGNOSIS — E11.49 TYPE II DIABETES MELLITUS WITH NEUROLOGICAL MANIFESTATIONS: ICD-10-CM

## 2019-06-13 DIAGNOSIS — M20.5X1 HALLUX LIMITUS, ACQUIRED, RIGHT: ICD-10-CM

## 2019-06-13 DIAGNOSIS — S93.402D INVERSION SPRAIN OF ANKLE, LEFT, SUBSEQUENT ENCOUNTER: ICD-10-CM

## 2019-06-13 DIAGNOSIS — M20.5X2 HALLUX LIMITUS, ACQUIRED, LEFT: ICD-10-CM

## 2019-06-13 DIAGNOSIS — M79.672 FOOT PAIN, LEFT: Primary | ICD-10-CM

## 2019-06-13 DIAGNOSIS — M20.42 HAMMER TOES OF BOTH FEET: ICD-10-CM

## 2019-06-13 PROCEDURE — 3078F PR MOST RECENT DIASTOLIC BLOOD PRESSURE < 80 MM HG: ICD-10-PCS | Mod: HCNC,CPTII,S$GLB, | Performed by: PODIATRIST

## 2019-06-13 PROCEDURE — 1101F PR PT FALLS ASSESS DOC 0-1 FALLS W/OUT INJ PAST YR: ICD-10-PCS | Mod: HCNC,CPTII,S$GLB, | Performed by: PODIATRIST

## 2019-06-13 PROCEDURE — 99999 PR PBB SHADOW E&M-EST. PATIENT-LVL III: ICD-10-PCS | Mod: PBBFAC,HCNC,, | Performed by: PODIATRIST

## 2019-06-13 PROCEDURE — 99214 PR OFFICE/OUTPT VISIT, EST, LEVL IV, 30-39 MIN: ICD-10-PCS | Mod: HCNC,S$GLB,, | Performed by: PODIATRIST

## 2019-06-13 PROCEDURE — 99214 OFFICE O/P EST MOD 30 MIN: CPT | Mod: HCNC,S$GLB,, | Performed by: PODIATRIST

## 2019-06-13 PROCEDURE — 1101F PT FALLS ASSESS-DOCD LE1/YR: CPT | Mod: HCNC,CPTII,S$GLB, | Performed by: PODIATRIST

## 2019-06-13 PROCEDURE — 3044F PR MOST RECENT HEMOGLOBIN A1C LEVEL <7.0%: ICD-10-PCS | Mod: HCNC,CPTII,S$GLB, | Performed by: PODIATRIST

## 2019-06-13 PROCEDURE — 3077F PR MOST RECENT SYSTOLIC BLOOD PRESSURE >= 140 MM HG: ICD-10-PCS | Mod: HCNC,CPTII,S$GLB, | Performed by: PODIATRIST

## 2019-06-13 PROCEDURE — 3077F SYST BP >= 140 MM HG: CPT | Mod: HCNC,CPTII,S$GLB, | Performed by: PODIATRIST

## 2019-06-13 PROCEDURE — 3044F HG A1C LEVEL LT 7.0%: CPT | Mod: HCNC,CPTII,S$GLB, | Performed by: PODIATRIST

## 2019-06-13 PROCEDURE — 3078F DIAST BP <80 MM HG: CPT | Mod: HCNC,CPTII,S$GLB, | Performed by: PODIATRIST

## 2019-06-13 PROCEDURE — 99999 PR PBB SHADOW E&M-EST. PATIENT-LVL III: CPT | Mod: PBBFAC,HCNC,, | Performed by: PODIATRIST

## 2019-06-13 NOTE — PROGRESS NOTES
Subjective:      Patient ID: Regino Lawrence is a 73 y.o. female.    Chief Complaint: Foot Pain (left foot (PCP Dr Mendoza 03/19/19)) and Foot Problem    Regino is a 73 y.o. female who presents to the clinic for evaluation and treatment of high risk feet. 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 The patient's chief complaint is  left foot midpain. Description: severe Nature: aching, sharp and throbbing Location: midfoot and ankles Onset of the symptoms was several months ago. Precipitating event: hip pain.  History of injury: no Current symptoms include: worsening symptoms after a period of activity. Aggravating factors: any weight bearing. Alleviating factors: rest Symptoms have gradually worsened. Patient has had prior foot problems. Evaluation to date: seen by neurology, neurosurgery, pain management. Treatment to date: steroid injections to the hip, rx shoes. Patients rates pain 10/10 on pain scale.   This patient has documented high risk feet requiring routine maintenance secondary to diabetes mellitis and those secondary complications of diabetes, as mentioned..    03/28/19 She has been wearing compression socks and tennis shoes and relates complete improvement to the midfoot and minimal to no improvement to the ankle.    06/13/19 She has been wearing compression socks and new AFO and tennis  "shoes and relates complete improvement to the midfoot and to the ankle with use of brace.  Patient has been suffering with "left hip bursitis" and back pain which aggravates the foot.         PCP: Micaela Mendoza MD    Date Last Seen by PCP:   Chief Complaint   Patient presents with    Foot Pain     left foot (PCP Dr Mendoza 03/19/19)    Foot Problem     Current shoe gear:  New rx shoes and AFO to the left ankle.  She relates new rx shoes are tight around the toes     Hemoglobin A1C   Date Value Ref Range Status   03/12/2019 6.1 (H) 4.0 - 5.6 % Final     Comment:     ADA Screening Guidelines:  5.7-6.4%  Consistent with prediabetes  >or=6.5%  Consistent with diabetes  High levels of fetal hemoglobin interfere with the HbA1C  assay. Heterozygous hemoglobin variants (HbS, HgC, etc)do  not significantly interfere with this assay.   However, presence of multiple variants may affect accuracy.     01/22/2018 6.0 (H) 4.0 - 5.6 % Final     Comment:     According to ADA guidelines, hemoglobin A1c <7.0% represents  optimal control in non-pregnant diabetic patients. Different  metrics may apply to specific patient populations.   Standards of Medical Care in Diabetes-2016.  For the purpose of screening for the presence of diabetes:  <5.7%     Consistent with the absence of diabetes  5.7-6.4%  Consistent with increasing risk for diabetes   (prediabetes)  >or=6.5%  Consistent with diabetes  Currently, no consensus exists for use of hemoglobin A1c  for diagnosis of diabetes for children.  This Hemoglobin A1c assay has significant interference with fetal   hemoglobin   (HbF). The results are invalid for patients with abnormal amounts of   HbF,   including those with known Hereditary Persistence   of Fetal Hemoglobin. Heterozygous hemoglobin variants (HbAS, HbAC,   HbAD, HbAE, HbA2) do not significantly interfere with this assay;   however, presence of multiple variants in a sample may impact the %   interference.     06/30/2017 5.9 " (H) 4.0 - 5.6 % Final     Comment:     According to ADA guidelines, hemoglobin A1c <7.0% represents  optimal control in non-pregnant diabetic patients. Different  metrics may apply to specific patient populations.   Standards of Medical Care in Diabetes-2016.  For the purpose of screening for the presence of diabetes:  <5.7%     Consistent with the absence of diabetes  5.7-6.4%  Consistent with increasing risk for diabetes   (prediabetes)  >or=6.5%  Consistent with diabetes  Currently, no consensus exists for use of hemoglobin A1c  for diagnosis of diabetes for children.  This Hemoglobin A1c assay has significant interference with fetal   hemoglobin   (HbF). The results are invalid for patients with abnormal amounts of   HbF,   including those with known Hereditary Persistence   of Fetal Hemoglobin. Heterozygous hemoglobin variants (HbAS, HbAC,   HbAD, HbAE, HbA2) do not significantly interfere with this assay;   however, presence of multiple variants in a sample may impact the %   interference.       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, without long-term current use of insulin    Sarcoidosis    Corneal scar, right eye    Nuclear sclerosis    Senile nuclear sclerosis    Immunosuppression    Vitamin B12 deficiency anemia    Left shoulder pain    Cervical spinal stenosis    Patient is Shinto    GERD (gastroesophageal reflux disease)    Debility    S/P cervical spinal fusion    Chronic bilateral low back pain with left-sided sciatica    Degenerative disc  disease, lumbar    Poor motor control of trunk    Impaired mobility    Muscle weakness    Iron deficiency anemia    Anemia in CKD (chronic kidney disease)    Refusal of blood transfusions as patient is Restorationist    Current use of steroid medication    DM type 2 without retinopathy    Pseudophakia    Insufficiency of tear film of both eyes    Refractive error    Myopathy    Osteopenia    Calcification of aorta    Dry eye syndrome, bilateral    Neck pain    Lumbar disc herniation with radiculopathy    Acute left-sided low back pain without sciatica    Antalgic gait    Lumbar radiculopathy    Lumbar stenosis with neurogenic claudication    Chronic bilateral low back pain without sciatica    Anemia in chronic kidney disease    Greater trochanteric bursitis of left hip    Muscle weakness of lower extremity    Poor posture    Impaired functional mobility, balance, gait, and endurance    Left hip pain    FAUSTIN (dyspnea on exertion)    Chest pain, atypical    Hemispheric retinal vein occlusion with macular edema of left eye     Current Outpatient Medications on File Prior to Visit   Medication Sig Dispense Refill    ACCU-CHEK FASTCLIX LANCING DEV Kit USE AS DIRECTED. 1 each 0    ACCU-CHEK SMARTVIEW TEST STRIP Strp TEST  ONCE  A   strip 11    ALCOHOL ANTISEPTIC PADS (ALCOHOL PREP PADS TOP)       atorvastatin (LIPITOR) 20 MG tablet Take 1 tablet (20 mg total) by mouth once daily. 90 tablet 3    blood-glucose meter kit Use as instructed 1 each 0    carvedilol (COREG) 25 MG tablet Take 1 tablet (25 mg total) by mouth 2 (two) times daily. 180 tablet 3    cloNIDine (CATAPRES) 0.3 MG tablet TAKE 1 TABLET BY MOUTH THREE TIMES DAILY 270 tablet 1    epoetin german (PROCRIT INJ) Inject 1,000 Units as directed.      fenofibrate 160 MG Tab Take 1 tablet (160 mg total) by mouth once daily. 90 tablet 0    folic acid (FOLVITE) 1 MG tablet TAKE 1 TABLET BY MOUTH ONCE DAILY 90 tablet 0     gabapentin (NEURONTIN) 400 MG capsule Take 400 mg by mouth 3 (three) times daily.      HYDROcodone-acetaminophen (NORCO) 5-325 mg per tablet Take 1 tablet by mouth every 6 (six) hours as needed. 90 tablet 0    levothyroxine (SYNTHROID) 50 MCG tablet TAKE 1 TABLET BY MOUTH ONCE DAILY BEFORE BREAKFAST 90 tablet 1    methotrexate 2.5 MG Tab TAKE 6 TABLETS BY MOUTH EVERY 7 DAYS 72 tablet 0    NIFEdipine (PROCARDIA-XL) 30 MG (OSM) 24 hr tablet Take 1 tablet (30 mg total) by mouth once daily. 90 tablet 1    predniSONE (DELTASONE) 5 MG tablet Take 1 tablet (5 mg total) by mouth once daily. 90 tablet 1    calcium carbonate (OS-MALCOLM) 600 mg calcium (1,500 mg) Tab Take 1 tablet by mouth 2 (two) times daily.       metFORMIN (GLUCOPHAGE) 1000 MG tablet Take 1 tablet (1,000 mg total) by mouth 2 (two) times daily with meals. 180 tablet 1     No current facility-administered medications on file prior to visit.      Review of patient's allergies indicates:   Allergen Reactions    Azathioprine Shortness Of Breath and Other (See Comments)     Fatigue     Past Surgical History:   Procedure Laterality Date    CARPAL TUNNEL RELEASE      Rt wrist    CATARACT EXTRACTION W/  INTRAOCULAR LENS IMPLANT Right 2015    Dr. Azevedo    CATARACT EXTRACTION W/  INTRAOCULAR LENS IMPLANT Left 2015    Dr. Azevedo     SECTION      CHOLECYSTECTOMY      COLPOCLEISIS-- lefort N/A 2016    [facility]    CYSTOSCOPY N/A 2016    [facility] OR    Injection, Joint  fLUOROSCOPIC jOINT iNJECTION (hIP iNJECTION) LEFT ROCH BURSA AS WELL LEFT TROCHANTERIC BURSA Left 3/21/2019    Per[facility] by [facility]fonso Richards [facility] at [facility] PAIN MGT    Injection,steroid,epidural,transforaminal approach    Bilateral L5 Bilateral 2018    [facility] at [facility] PAIN MGT    Injection,steroid,epidural,transforaminal approach  Left L5-S1 Left 10/8/2018     [facility] PAIN MGT    INSERTION-INTRAOCULAR LENS (IOL) Left 5/21/2015    [facility] OR    INSERTION-INTRAOCULAR LENS (IOL) Right 4/30/2015    [facility] OR    LAMINECTOMY-CERVICAL/FUSION-POSTERIOR C3-C7 N/A 11/16/2015    [facility] 2ND FLR    PHACOEMULSIFICATION-ASPIRATION-CATARACT Left 5/21/2015    [facility] OR    PHACOEMULSIFICATION-ASPIRATION-CATARACT Right 4/30/2015    [facility] at [facility] OR    REPAIR-POSTERIOR N/A 11/28/2016    [facility] OR    SLING-TRANSOBTERATOR TAPE N/A 11/28/2016    [facility] at [facility] OR    TUBAL LIGATION       Family History   Problem Relation Age of Onset    Hypertension Mother     Cataracts Mother     No Known Problems Father     Hypertension Maternal Grandmother     Glaucoma Sister     Arthritis Sister     No Known Problems Brother     No Known Problems Maternal Aunt     No Known Problems Maternal Uncle     No Known Problems Paternal Aunt     No Known Problems Paternal Uncle     No Known Problems Maternal Grandfather     No Known Problems Paternal Grandmother     No Known Problems Paternal Grandfather     Kidney failure Sister     Hepatitis Sister     Cancer Sister         bone cancer     Immunodeficiency Sister     Lupus Neg Hx     Rheum arthritis Neg Hx     Amblyopia Neg Hx     Blindness Neg Hx     Diabetes Neg Hx     Macular degeneration Neg Hx     Retinal detachment Neg Hx     Strabismus Neg Hx     Stroke Neg Hx     Thyroid disease Neg Hx     Endometrial cancer Neg Hx     Vaginal cancer Neg Hx     Cervical cancer Neg Hx      Social History     Socioeconomic History    Marital status:      Spouse name: Not on file    Number of children: Not on file    Years of education: Not on  file    Highest education level: Not on file   Occupational History    Not on file   Social Needs    Financial resource strain: Not on file    Food insecurity:     Worry: Not on file     Inability: Not on file    Transportation needs:     Medical: Not on file     Non-medical: Not on file   Tobacco Use    Smoking status: Never Smoker    Smokeless tobacco: Never Used   Substance and Sexual Activity    Alcohol use: No    Drug use: No    Sexual activity: Yes     Partners: Male   Lifestyle    Physical activity:     Days per week: Not on file     Minutes per session: Not on file    Stress: Not on file   Relationships    Social connections:     Talks on phone: Not on file     Gets together: Not on file     Attends Rastafarian service: Not on file     Active member of club or organization: Not on file     Attends meetings of clubs or organizations: Not on file     Relationship status: Not on file   Other Topics Concern    Not on file   Social History Narrative    Not on file       Review of Systems   Constitution: Negative for chills and fever.        She feels tired and weak   Cardiovascular: Negative for chest pain, claudication and leg swelling.   Respiratory: Negative for cough and shortness of breath.    Skin: Positive for dry skin and nail changes. Negative for itching and rash.   Musculoskeletal: Positive for arthritis, back pain, joint pain, muscle weakness, myalgias and neck pain. Negative for falls and joint swelling.   Gastrointestinal: Positive for nausea. Negative for diarrhea and vomiting.   Neurological: Positive for loss of balance, numbness, paresthesias, sensory change and weakness. Negative for tremors.   Psychiatric/Behavioral: Negative for altered mental status and hallucinations.           Objective:       Vitals:    06/13/19 1021   BP: (!) 148/78   Weight: 59 kg (130 lb)   Height: 5' (1.524 m)   PainSc:   7       Physical Exam   Constitutional:   General: Pt. is well-developed,  well-nourished, appears stated age, in no acute distress, alert and oriented x 3. No evidence of depression, anxiety, or agitation. Calm, cooperative, and communicative. Appropriate interactions and affect.       Cardiovascular:   Pulses:       Dorsalis pedis pulses are 1+ on the right side, and 1+ on the left side.        Posterior tibial pulses are 1+ on the right side, and 1+ on the left side.   There is decreased digital hair. Skin is atrophic, hyperpigmented, and mildly edematous       Musculoskeletal:        Right ankle: She exhibits swelling. No tenderness. Achilles tendon exhibits no pain, no defect and normal Naik's test results.        Left ankle: She exhibits swelling. Tenderness. AITFL and CF ligament tenderness found. Achilles tendon exhibits no pain and no defect.        Right foot: There is decreased range of motion and tenderness.        Left foot: There is decreased range of motion, tenderness (midfoot) and crepitus (midfoot).   5/5 muscle strength Bilaterally. There is some limitation of dorsiflexion with knees extended Bilaterally, adequate with knees flexed.     Decreased stride, station of gait.  apropulsive toe off.  Increased angle and base of gait.    Patient has hammertoes of digits 2-5 bilateral partially reducible without symptom today.    Tailor bunion present 5th mtpj right with medial deviation of 5th toe, prominent bony bump lateral 5th mtpj, and pain to palpation without evidence of trauma or infection.   Neurological: A sensory deficit is present.   [place]grid 5.07 monofilamant testing is diminished Ted feet. Sharp/dull sensation diminished Bilaterally.   Skin: Skin is warm, dry and intact. No abrasion, no ecchymosis, no lesion and no rash noted. She is not diaphoretic. No cyanosis or erythema. No pallor. Nails show no clubbing.   Toenails 1-5 bilaterally are thickened by 2-3 mm, discolored/yellowed, dystrophic, brittle with subungual debris.    Interdigital Spaces clean,  dry and without evidence of break in skin integrity   Psychiatric: She has a normal mood and affect. Her speech is normal.   Nursing note and vitals reviewed.        Assessment:       Encounter Diagnoses   Name Primary?    Foot pain, left Yes    Inversion sprain of ankle, left, subsequent encounter     Arthralgia of left foot     Hammer toes of both feet     Hallux limitus, acquired, left     Hallux limitus, acquired, right     Type II diabetes mellitus with neurological manifestations          Plan:       Regino was seen today for foot pain and foot problem.    Diagnoses and all orders for this visit:    Foot pain, left  -     ORTHOTIC DEVICE (DME)    Inversion sprain of ankle, left, subsequent encounter  -     ORTHOTIC DEVICE (DME)    Arthralgia of left foot  -     ORTHOTIC DEVICE (DME)    Hammer toes of both feet  -     ORTHOTIC DEVICE (DME)    Hallux limitus, acquired, left    Hallux limitus, acquired, right    Type II diabetes mellitus with neurological manifestations  -     ORTHOTIC DEVICE (DME)      I counseled the patient on her conditions, their implications and medical management.    Greater than 50% of this visit spent on counseling and coordination of care.    Patient education on arthralgias of the foot. Discussed non-surgical treatment options, including injection, supportive shoegear, inserts.     Discussed ankle sprains and importance of immobilization for healing as well as the lengthy course of treatment for complete resolution.    Tubigrip to LLE; patient is to elevate legs. When sleeping, place a pillow under lower extremities. When sitting, support the legs so that they are level with the waist.    Continue diabetic shoes for protection and support, however patient to return to orthotist regarding shoe fit. rx for shoe modification to eliminate any potential blistering or ulceration.    Patient instructed to rest affected limb, avoid excessive WB and use crutch or walker assistance if  needed.     Instructions on elevation to reduce pain and swelling. When sleeping, place a pillow under the injured leg. When sitting, support the injured leg so it is level with your waist.  Patient instructed on adequate icing techniques. Patient should ice the affected area at least once per day x 10 minutes for 10 days . I advised the  patient that extra icing would also be beneficial to ensure adequate anti inflammatory effect     Rx topical pain cream from professional [employer] to be delivered to patient home.    She is currently in PT      RTC PRN

## 2019-06-13 NOTE — PATIENT INSTRUCTIONS
Recommend lotions: eucerin, eucerin for diabetics, aquaphor, A&D ointment, gold bond for diabetics, sween, [place]'s Bees all purpose baby ointment,  urea 40 with aloe (found on amazon.com)    Shoe recommendations: (try 6pm.com, zappos.com , [email or web address].[email or web address], or shoes.[employer] for discounted prices) you can visit DSW shoes in [place]  or [employer] [facility] in the [facility] (there are also several shoe brand outlets in the [facility])    Asics (GT 2000 or gel foundations), new balance stability type shoes, saucony (stabil c3),  [place] (GTS or Beast or transcend), propet (tennis shoe)    Sofchasity Hernandez (women) Robert&Juan (men), clarks, crocs, aerosoles, naturalizers, SAS, ecco, born, zoey emmanuel, rockports (dress shoes)    Vionic, burkenstocks, fitflops, propet (sandals)  Nike comfort thong sandals, crocs, propet (house shoes)    Nail Home remedy:  Vicks Vapor rub to nails for easier manageability        Diabetes: Inspecting Your Feet  Diabetes increases your chances of developing foot problems. So inspect your feet every day. This helps you find small skin irritations before they become serious infections. If you have trouble seeing the bottoms of your feet, use a mirror or ask a family member or friend to help.     Pressure spots on the bottom of the foot are common areas where problems develop.   How to check your feet  Below are tips to help you look for foot problems. Try to check your feet at the same time each day, such as when you get out of bed in the morning:  · Check the top of each foot. The tops of toes, back of the heel, and outer edge of the foot can get a lot of rubbing from poor-fitting shoes.  · Check the bottom of each foot. Daily wear and tear often leads to problems at pressure spots.  · Check the toes and nails. Fungal infections often occur between toes. Toenail problems can also be a sign of fungal infections or lead to breaks in the skin.  · Check your shoes, too. Loose objects inside a shoe can injure  the foot. Use your hand to feel inside your shoes for things like bob, loose stitching, or rough areas that could irritate your skin.  Warning signs  Look for any color changes in the foot. Redness with streaks can signal a severe infection, which needs immediate medical attention. Tell your doctor right away if you have any of these problems:  · Swelling, sometimes with color changes, may be a sign of poor blood flow or infection. Symptoms include tenderness and an increase in the size of your foot.  · Warm or hot areas on your feet may be signs of infection. A foot that is cold may not be getting enough blood.  · Sensations such as burning, tingling, or pins and needles can be signs of a problem. Also check for areas that may be numb.  · Hot spots are caused by friction or pressure. Look for hot spots in areas that get a lot of rubbing. Hot spots can turn into blisters, calluses, or sores.  · Cracks and sores are caused by dry or irritated skin. They are a sign that the skin is breaking down, which can lead to infection.  · Toenail problems to watch for include nails growing into the skin (ingrown toenail) and causing redness or pain. Thick, yellow, or discolored nails can signal a fungal infection.  · Drainage and odor can develop from untreated sores and ulcers. Call your doctor right away if you notice white or yellow drainage, bleeding, or unpleasant odor.   © 8813-0223 [employer]. [address] [place] [address]. All rights reserved. This information is not intended as a substitute for professional medical care. Always follow your healthcare professional's instructions.        Step-by-Step:  Inspecting Your Feet (Diabetes)    Date Last Reviewed: 10/1/2016  © 2564-4159 [employer]. [address] [place] [address]. All rights reserved. This information is not intended as a substitute for professional medical care. Always follow your healthcare  professional's instructions.

## 2019-06-14 ENCOUNTER — INFUSION (OUTPATIENT)
Dept: INFUSION THERAPY | Facility: HOSPITAL | Age: 74
End: 2019-06-14
Attending: INTERNAL MEDICINE
Payer: MEDICARE

## 2019-06-14 VITALS
OXYGEN SATURATION: 96 % | DIASTOLIC BLOOD PRESSURE: 79 MMHG | SYSTOLIC BLOOD PRESSURE: 144 MMHG | RESPIRATION RATE: 18 BRPM | HEART RATE: 72 BPM | TEMPERATURE: 98 F

## 2019-06-14 DIAGNOSIS — D63.1 ANEMIA IN CHRONIC KIDNEY DISEASE, UNSPECIFIED CKD STAGE: Primary | ICD-10-CM

## 2019-06-14 DIAGNOSIS — D50.9 IRON DEFICIENCY ANEMIA, UNSPECIFIED IRON DEFICIENCY ANEMIA TYPE: ICD-10-CM

## 2019-06-14 DIAGNOSIS — N18.9 ANEMIA IN CHRONIC KIDNEY DISEASE, UNSPECIFIED CKD STAGE: Primary | ICD-10-CM

## 2019-06-14 DIAGNOSIS — Z53.1 REFUSAL OF BLOOD TRANSFUSIONS AS PATIENT IS JEHOVAH'S WITNESS: ICD-10-CM

## 2019-06-14 PROCEDURE — 63600175 PHARM REV CODE 636 W HCPCS: Mod: HCNC,JG,EC | Performed by: INTERNAL MEDICINE

## 2019-06-14 PROCEDURE — 96372 THER/PROPH/DIAG INJ SC/IM: CPT | Mod: HCNC

## 2019-06-14 RX ADMIN — ERYTHROPOIETIN 20000 UNITS: 20000 INJECTION, SOLUTION INTRAVENOUS; SUBCUTANEOUS at 11:06

## 2019-06-14 NOTE — PLAN OF CARE
Problem: Adult Inpatient Plan of Care  Goal: Plan of Care Review  Outcome: Ongoing (interventions implemented as appropriate)  Patient Hgb 10.9 Patient received Procrit injection. Tolerated well. VSS. Received discharge instructions and follow up appointments. Verbalized understanding and ambulated unassisted off unit.

## 2019-06-20 ENCOUNTER — LAB VISIT (OUTPATIENT)
Dept: LAB | Facility: HOSPITAL | Age: 74
End: 2019-06-20
Attending: INTERNAL MEDICINE
Payer: MEDICARE

## 2019-06-20 ENCOUNTER — OFFICE VISIT (OUTPATIENT)
Dept: FAMILY MEDICINE | Facility: CLINIC | Age: 74
End: 2019-06-20
Payer: MEDICARE

## 2019-06-20 VITALS
HEART RATE: 87 BPM | TEMPERATURE: 98 F | WEIGHT: 111.88 LBS | BODY MASS INDEX: 21.97 KG/M2 | OXYGEN SATURATION: 95 % | RESPIRATION RATE: 18 BRPM | HEIGHT: 60 IN | DIASTOLIC BLOOD PRESSURE: 76 MMHG | SYSTOLIC BLOOD PRESSURE: 138 MMHG

## 2019-06-20 DIAGNOSIS — Z12.31 ENCOUNTER FOR SCREENING MAMMOGRAM FOR BREAST CANCER: ICD-10-CM

## 2019-06-20 DIAGNOSIS — D63.1 ANEMIA IN CHRONIC KIDNEY DISEASE, UNSPECIFIED CKD STAGE: ICD-10-CM

## 2019-06-20 DIAGNOSIS — M48.062 LUMBAR STENOSIS WITH NEUROGENIC CLAUDICATION: ICD-10-CM

## 2019-06-20 DIAGNOSIS — D84.9 IMMUNOSUPPRESSION: Chronic | ICD-10-CM

## 2019-06-20 DIAGNOSIS — N18.9 ANEMIA IN CHRONIC KIDNEY DISEASE, UNSPECIFIED CKD STAGE: ICD-10-CM

## 2019-06-20 DIAGNOSIS — M54.16 LUMBAR RADICULOPATHY: ICD-10-CM

## 2019-06-20 DIAGNOSIS — I10 ESSENTIAL HYPERTENSION: Chronic | ICD-10-CM

## 2019-06-20 DIAGNOSIS — M51.16 LUMBAR DISC HERNIATION WITH RADICULOPATHY: Primary | ICD-10-CM

## 2019-06-20 DIAGNOSIS — E11.8 CONTROLLED TYPE 2 DIABETES MELLITUS WITH COMPLICATION, WITHOUT LONG-TERM CURRENT USE OF INSULIN: Chronic | ICD-10-CM

## 2019-06-20 LAB
BASOPHILS # BLD AUTO: 0.01 K/UL (ref 0–0.2)
BASOPHILS NFR BLD: 0.2 % (ref 0–1.9)
DIFFERENTIAL METHOD: ABNORMAL
EOSINOPHIL # BLD AUTO: 0 K/UL (ref 0–0.5)
EOSINOPHIL NFR BLD: 0.4 % (ref 0–8)
ERYTHROCYTE [DISTWIDTH] IN BLOOD BY AUTOMATED COUNT: 14.3 % (ref 11.5–14.5)
FERRITIN SERPL-MCNC: 267 NG/ML (ref 20–300)
HCT VFR BLD AUTO: 35.5 % (ref 37–48.5)
HGB BLD-MCNC: 11.1 G/DL (ref 12–16)
IMM GRANULOCYTES # BLD AUTO: 0.02 K/UL (ref 0–0.04)
IMM GRANULOCYTES NFR BLD AUTO: 0.4 % (ref 0–0.5)
IRON SERPL-MCNC: 94 UG/DL (ref 30–160)
LYMPHOCYTES # BLD AUTO: 1.1 K/UL (ref 1–4.8)
LYMPHOCYTES NFR BLD: 20.6 % (ref 18–48)
MCH RBC QN AUTO: 34.5 PG (ref 27–31)
MCHC RBC AUTO-ENTMCNC: 31.3 G/DL (ref 32–36)
MCV RBC AUTO: 110 FL (ref 82–98)
MONOCYTES # BLD AUTO: 0.5 K/UL (ref 0.3–1)
MONOCYTES NFR BLD: 9 % (ref 4–15)
NEUTROPHILS # BLD AUTO: 3.6 K/UL (ref 1.8–7.7)
NEUTROPHILS NFR BLD: 69.4 % (ref 38–73)
NRBC BLD-RTO: 0 /100 WBC
PLATELET # BLD AUTO: 305 K/UL (ref 150–350)
PMV BLD AUTO: 9.4 FL (ref 9.2–12.9)
RBC # BLD AUTO: 3.22 M/UL (ref 4–5.4)
SATURATED IRON: 25 % (ref 20–50)
TOTAL IRON BINDING CAPACITY: 379 UG/DL (ref 250–450)
TRANSFERRIN SERPL-MCNC: 256 MG/DL (ref 200–375)
WBC # BLD AUTO: 5.2 K/UL (ref 3.9–12.7)

## 2019-06-20 PROCEDURE — 99499 UNLISTED E&M SERVICE: CPT | Mod: HCNC,S$GLB,, | Performed by: FAMILY MEDICINE

## 2019-06-20 PROCEDURE — 83540 ASSAY OF IRON: CPT | Mod: HCNC

## 2019-06-20 PROCEDURE — 1101F PR PT FALLS ASSESS DOC 0-1 FALLS W/OUT INJ PAST YR: ICD-10-PCS | Mod: HCNC,CPTII,S$GLB, | Performed by: FAMILY MEDICINE

## 2019-06-20 PROCEDURE — 1101F PT FALLS ASSESS-DOCD LE1/YR: CPT | Mod: HCNC,CPTII,S$GLB, | Performed by: FAMILY MEDICINE

## 2019-06-20 PROCEDURE — 3044F PR MOST RECENT HEMOGLOBIN A1C LEVEL <7.0%: ICD-10-PCS | Mod: HCNC,CPTII,S$GLB, | Performed by: FAMILY MEDICINE

## 2019-06-20 PROCEDURE — 3078F DIAST BP <80 MM HG: CPT | Mod: HCNC,CPTII,S$GLB, | Performed by: FAMILY MEDICINE

## 2019-06-20 PROCEDURE — 3078F PR MOST RECENT DIASTOLIC BLOOD PRESSURE < 80 MM HG: ICD-10-PCS | Mod: HCNC,CPTII,S$GLB, | Performed by: FAMILY MEDICINE

## 2019-06-20 PROCEDURE — 3075F SYST BP GE 130 - 139MM HG: CPT | Mod: HCNC,CPTII,S$GLB, | Performed by: FAMILY MEDICINE

## 2019-06-20 PROCEDURE — 99999 PR PBB SHADOW E&M-EST. PATIENT-LVL III: ICD-10-PCS | Mod: PBBFAC,HCNC,, | Performed by: FAMILY MEDICINE

## 2019-06-20 PROCEDURE — 99999 PR PBB SHADOW E&M-EST. PATIENT-LVL III: CPT | Mod: PBBFAC,HCNC,, | Performed by: FAMILY MEDICINE

## 2019-06-20 PROCEDURE — 36415 COLL VENOUS BLD VENIPUNCTURE: CPT | Mod: HCNC,PO

## 2019-06-20 PROCEDURE — 99214 PR OFFICE/OUTPT VISIT, EST, LEVL IV, 30-39 MIN: ICD-10-PCS | Mod: HCNC,S$GLB,, | Performed by: FAMILY MEDICINE

## 2019-06-20 PROCEDURE — 82728 ASSAY OF FERRITIN: CPT | Mod: HCNC

## 2019-06-20 PROCEDURE — 85025 COMPLETE CBC W/AUTO DIFF WBC: CPT | Mod: HCNC

## 2019-06-20 PROCEDURE — 3044F HG A1C LEVEL LT 7.0%: CPT | Mod: HCNC,CPTII,S$GLB, | Performed by: FAMILY MEDICINE

## 2019-06-20 PROCEDURE — 99214 OFFICE O/P EST MOD 30 MIN: CPT | Mod: HCNC,S$GLB,, | Performed by: FAMILY MEDICINE

## 2019-06-20 PROCEDURE — 3075F PR MOST RECENT SYSTOLIC BLOOD PRESS GE 130-139MM HG: ICD-10-PCS | Mod: HCNC,CPTII,S$GLB, | Performed by: FAMILY MEDICINE

## 2019-06-20 PROCEDURE — 99499 RISK ADDL DX/OHS AUDIT: ICD-10-PCS | Mod: HCNC,S$GLB,, | Performed by: FAMILY MEDICINE

## 2019-06-20 NOTE — PROGRESS NOTES
Chief Complaint   Patient presents with    Hypertension    Fatigue    Chronic Pain       HPI  Regino Lawrence is a 73 y.o. female with multiple medical diagnoses as listed in the medical history and problem list that presents for follow-up for HTN, fatigue, and chronic pain    HTN- currently controlled on nifedipine    Fatigue and chronic pain- she is having pain in her hip, noting a flare in her bursitis, slightly groggy, as she took a pain pill in the waiting room, reports having excruciating pain but her pain medication does help w/ this. She is hoping for an injection w/ pain mgmt that can relieve this also. Does not need any medication refills    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, without long-term current use of insulin     Depression     Diabetes mellitus, type 2     Eye injury as a child     k-abrasion  od    Hyperlipidemia     Hypertension     Hypothyroidism     Immune deficiency disorder     Immune disorder     Myalgia and myositis 2012    Osteoporosis     Polyneuropathy     Renal manifestation of secondary diabetes mellitus     Sarcoidosis     Ulcer     no cancer    Urinary incontinence        PAST SURGICAL HISTORY:  Past Surgical History:   Procedure Laterality Date    CARPAL TUNNEL RELEASE      Rt wrist    CATARACT EXTRACTION W/  INTRAOCULAR LENS IMPLANT Right 2015    Dr. Azevedo    CATARACT EXTRACTION W/  INTRAOCULAR LENS IMPLANT Left 2015    Dr. Azevedo     SECTION      CHOLECYSTECTOMY      COLPOCLEISIS-- [place] N/A 2016    [facility]    CYSTOSCOPY N/A 2016     [facility] at [facility] OR    Injection, Joint  fLUOROSCOPIC jOINT iNJECTION (hIP iNJECTION) LEFT ROCH BURSA AS WELL LEFT TROCHANTERIC BURSA Left 3/21/2019    Performed by Alfonso Richards MD at [facility] PAIN MGT    Injection,steroid,epidural,transforaminal approach    Bilateral L5 Bilateral 12/6/2018    Performed by [facility]so Richards MD at [facility] PAIN MGT    Injection,steroid,epidural,transforaminal approach  Left L5-S1 Left 10/8/2018    Performed by [facility] Richards [facility] at [facility] PAIN MGT    INSERTION-INTRAOCULAR LENS (IOL) Left 5/21/2015    [facility] OR    INSERTION-INTRAOCULAR LENS (IOL) Right 4/30/2015    [facility] at [facility] OR    LAMINECTOMY-CERVICAL/FUSION-POSTERIOR C3-C7 N/A 11/16/2015    [facility] at [facility] OR 2ND FLR    PHACOEMULSIFICATION-ASPIRATION-CATARACT Left 5/21/2015    [facility] at [facility] OR    PHACOEMULSIFICATION-ASPIRATION-CATARACT Right 4/30/2015    Performed by Elio [facility]edo [facility] at [facility] OR    REPAIR-POSTERIOR N/A 11/28/2016    [facility] by [facility]eredith [facility] at [facility] OR    SLING-TRANSOBTERATOR TAPE N/A 11/28/2016    Performed by [facility]eredith Becker [facility] at [facility] OR    TUBAL LIGATION         SOCIAL HISTORY:  Social History     Socioeconomic History    Marital status:      Spouse name: Not on file    Number of children: Not on file    Years of education: Not on file    Highest education level: Not on file   Occupational History    Not on file   Social Needs    Financial resource strain: Not on file    Food insecurity:     Worry: Not on file     Inability: Not on file    Transportation needs:     Medical: Not on file     Non-medical: Not on file   Tobacco Use    Smoking status: Never Smoker    Smokeless tobacco: Never Used   Substance and Sexual Activity    Alcohol use: No    Drug use: No    Sexual activity: Yes     Partners: Male   Lifestyle     Physical activity:     Days per week: Not on file     Minutes per session: Not on file    Stress: Not on file   Relationships    Social connections:     Talks on phone: Not on file     Gets together: Not on file     Attends Buddhist service: Not on file     Active member of club or organization: Not on file     Attends meetings of clubs or organizations: Not on file     Relationship status: Not on file   Other Topics Concern    Not on file   Social History Narrative    Not on file       FAMILY HISTORY:  Family History   Problem Relation Age of Onset    Hypertension Mother     Cataracts Mother     No Known Problems Father     Hypertension Maternal Grandmother     Glaucoma Sister     Arthritis Sister     No Known Problems Brother     No Known Problems Maternal Aunt     No Known Problems Maternal Uncle     No Known Problems Paternal Aunt     No Known Problems Paternal Uncle     No Known Problems Maternal Grandfather     No Known Problems Paternal Grandmother     No Known Problems Paternal Grandfather     Kidney failure Sister     Hepatitis Sister     Cancer Sister         bone cancer     Immunodeficiency Sister     Lupus Neg Hx     Rheum arthritis Neg Hx     Amblyopia Neg Hx     Blindness Neg Hx     Diabetes Neg Hx     Macular degeneration Neg Hx     Retinal detachment Neg Hx     Strabismus Neg Hx     Stroke Neg Hx     Thyroid disease Neg Hx     Endometrial cancer Neg Hx     Vaginal cancer Neg Hx     Cervical cancer Neg Hx        ALLERGIES AND MEDICATIONS: updated and reviewed.  Review of patient's allergies indicates:   Allergen Reactions    Azathioprine Shortness Of Breath and Other (See Comments)     Fatigue     Current Outpatient Medications   Medication Sig Dispense Refill    ACCU-CHEK FASTCLIX LANCING DEV Kit USE AS DIRECTED. 1 each 0    ACCU-CHEK SMARTVIEW TEST STRIP Strp TEST  ONCE  A   strip 11    ALCOHOL ANTISEPTIC PADS (ALCOHOL PREP PADS TOP)        atorvastatin (LIPITOR) 20 MG tablet Take 1 tablet (20 mg total) by mouth once daily. 90 tablet 3    blood-glucose meter kit Use as instructed 1 each 0    calcium carbonate (OS-MALCOLM) 600 mg calcium (1,500 mg) Tab Take 1 tablet by mouth 2 (two) times daily.       carvedilol (COREG) 25 MG tablet Take 1 tablet (25 mg total) by mouth 2 (two) times daily. 180 tablet 3    cloNIDine (CATAPRES) 0.3 MG tablet TAKE 1 TABLET BY MOUTH THREE TIMES DAILY 270 tablet 1    epoetin german (PROCRIT INJ) Inject 1,000 Units as directed.      fenofibrate 160 MG Tab Take 1 tablet (160 mg total) by mouth once daily. 90 tablet 0    folic acid (FOLVITE) 1 MG tablet TAKE 1 TABLET BY MOUTH ONCE DAILY 90 tablet 0    gabapentin (NEURONTIN) 400 MG capsule Take 400 mg by mouth 3 (three) times daily.      HYDROcodone-acetaminophen (NORCO) 5-325 mg per tablet Take 1 tablet by mouth every 6 (six) hours as needed. 90 tablet 0    levothyroxine (SYNTHROID) 50 MCG tablet TAKE 1 TABLET BY MOUTH ONCE DAILY BEFORE BREAKFAST 90 tablet 1    metFORMIN (GLUCOPHAGE) 1000 MG tablet Take 1 tablet (1,000 mg total) by mouth 2 (two) times daily with meals. 180 tablet 1    methotrexate 2.5 MG Tab TAKE 6 TABLETS BY MOUTH EVERY 7 DAYS 72 tablet 0    NIFEdipine (PROCARDIA-XL) 30 MG (OSM) 24 hr tablet Take 1 tablet (30 mg total) by mouth once daily. 90 tablet 1    predniSONE (DELTASONE) 5 MG tablet Take 1 tablet (5 mg total) by mouth once daily. 90 tablet 1     No current facility-administered medications for this visit.        ROS  Review of Systems   Constitutional: Negative for chills, diaphoresis, fatigue, fever and unexpected weight change.   HENT: Negative for rhinorrhea, sinus pressure, sore throat and tinnitus.    Eyes: Negative for photophobia and visual disturbance.   Respiratory: Negative for cough, shortness of breath and wheezing.    Cardiovascular: Negative for chest pain and palpitations.   Gastrointestinal: Negative for abdominal pain, blood in  stool, constipation, diarrhea, nausea and vomiting.   Genitourinary: Negative for dysuria, flank pain, frequency and vaginal discharge.   Musculoskeletal: Positive for arthralgias and back pain. Negative for joint swelling.   Skin: Negative for rash.   Neurological: Negative for speech difficulty, weakness, light-headedness and headaches.   Psychiatric/Behavioral: Negative for behavioral problems and dysphoric mood.       Physical Exam  Vitals:    06/20/19 0954   BP: 138/76   Pulse: 87   Resp: 18   Temp: 98.3 °F (36.8 °C)   TempSrc: Oral   SpO2: 95%   Weight: 50.8 kg (111 lb 14.1 oz)   Height: 5' (1.524 m)    Body mass index is 21.85 kg/m².  Weight: 50.8 kg (111 lb 14.1 oz)   Height: 5' (152.4 cm)     Physical Exam   Constitutional: She is oriented to person, place, and time. She appears well-developed and well-nourished.   Eyes: EOM are normal.   Neurological: She is alert and oriented to person, place, and time.   Skin: Skin is warm and dry. No rash noted. No erythema.   Psychiatric: She has a normal mood and affect. Her behavior is normal.   Nursing note and vitals reviewed.      Health Maintenance       Date Due Completion Date    Shingles Vaccine (2 of 3) 07/20/2015 5/25/2015    Foot Exam 06/19/2019 6/19/2018 (Done)    Override on 6/19/2018: Done    Override on 5/12/2017: Done    Mammogram 06/26/2019 6/26/2018    Influenza Vaccine 08/01/2019 8/30/2018    Override on 8/8/2018: Done    Override on 9/27/2017: Done    Hemoglobin A1c 09/12/2019 3/12/2019    Urine Microalbumin 09/12/2019 9/12/2018    Lipid Panel 03/12/2020 3/12/2019    DEXA SCAN 05/05/2020 5/5/2017    Eye Exam 06/03/2020 6/3/2019    Colonoscopy 02/09/2025 2/9/2015 (Done)    Override on 2/9/2015: Done    Override on 2/18/2005: Done (reportedly normal)    TETANUS VACCINE 05/16/2026 5/16/2016          Health maintenance reviewed and addressed as ordered      ASSESSMENT     1. Lumbar disc herniation with radiculopathy    2. Lumbar radiculopathy    3.  Lumbar stenosis with neurogenic claudication    4. Essential hypertension    5. Immunosuppression    6. Encounter for screening mammogram for breast cancer    7. Controlled type 2 diabetes mellitus with complication, without long-term current use of insulin        PLAN:     Problem List Items Addressed This Visit        Neuro    Lumbar disc herniation with radiculopathy - Primary  -continue current regimen of norco for pain, neurontin  -f/u w/ pain mgmt    Lumbar radiculopathy  -neurontin, norco prn for pain    Lumbar stenosis with neurogenic claudication  -f/u w/ pain mgmt       Cardiac/Vascular    Essential hypertension (Chronic)  -controlled on nifedipine, clonidine,        Immunology/Multi System    Immunosuppression (Chronic)  -f/u w/ Rheum       Endocrine    Diabetes mellitus type 2, controlled (Chronic)  -last A1C 6.1  -on statin      Other Visit Diagnoses     Encounter for screening mammogram for breast cancer      -as ordered       Relevant Orders    Mammo Digital Screening Brinda Mendoza MD  06/20/2019 10:35 AM        Follow up in about 3 months (around 9/20/2019) for Follow up.

## 2019-06-24 ENCOUNTER — INITIAL CONSULT (OUTPATIENT)
Dept: OPHTHALMOLOGY | Facility: CLINIC | Age: 74
End: 2019-06-24
Attending: OPHTHALMOLOGY
Payer: MEDICARE

## 2019-06-24 VITALS — SYSTOLIC BLOOD PRESSURE: 143 MMHG | HEART RATE: 80 BPM | DIASTOLIC BLOOD PRESSURE: 79 MMHG

## 2019-06-24 DIAGNOSIS — H17.9 CORNEAL SCAR, RIGHT EYE: ICD-10-CM

## 2019-06-24 DIAGNOSIS — H34.8120 HEMISPHERIC RETINAL VEIN OCCLUSION WITH MACULAR EDEMA OF LEFT EYE: Primary | ICD-10-CM

## 2019-06-24 PROCEDURE — 67028 INJECTION EYE DRUG: CPT | Mod: HCNC,LT,S$GLB, | Performed by: OPHTHALMOLOGY

## 2019-06-24 PROCEDURE — 92134 POSTERIOR SEGMENT OCT RETINA (OCULAR COHERENCE TOMOGRAPHY)-BOTH EYES: ICD-10-PCS | Mod: HCNC,S$GLB,, | Performed by: OPHTHALMOLOGY

## 2019-06-24 PROCEDURE — 99999 PR PBB SHADOW E&M-EST. PATIENT-LVL III: CPT | Mod: PBBFAC,HCNC,, | Performed by: OPHTHALMOLOGY

## 2019-06-24 PROCEDURE — 99999 PR PBB SHADOW E&M-EST. PATIENT-LVL III: ICD-10-PCS | Mod: PBBFAC,HCNC,, | Performed by: OPHTHALMOLOGY

## 2019-06-24 PROCEDURE — 92014 PR EYE EXAM, EST PATIENT,COMPREHESV: ICD-10-PCS | Mod: HCNC,S$GLB,, | Performed by: OPHTHALMOLOGY

## 2019-06-24 PROCEDURE — 92134 CPTRZ OPH DX IMG PST SGM RTA: CPT | Mod: HCNC,S$GLB,, | Performed by: OPHTHALMOLOGY

## 2019-06-24 PROCEDURE — 92014 COMPRE OPH EXAM EST PT 1/>: CPT | Mod: HCNC,S$GLB,, | Performed by: OPHTHALMOLOGY

## 2019-06-24 PROCEDURE — 67028 PR INJECT INTRAVITREAL PHARMCOLOGIC: ICD-10-PCS | Mod: HCNC,LT,S$GLB, | Performed by: OPHTHALMOLOGY

## 2019-06-24 RX ADMIN — Medication 1.25 MG: at 11:06

## 2019-06-24 NOTE — LETTER
June 25, 2019      Elio Azevedo MD  [address]  [place] [address]           [facility]  [address] [place] [address]  Phone: [phone]  Fax: [phone]          Patient: Regino Lawrence   MR Number: [id number]   YOB: 1945   Date of Visit: 6/24/2019       Dear Dr. Elio Azevedo:    Thank you for referring Regino Lawrence to me for evaluation. Attached you will find relevant portions of my assessment and plan of care.    If you have questions, please do not hesitate to call me. I look forward to following Regino Lawrence along with you.    Sincerely,    Hemanth Baker MD    Enclosure  CC:  No Recipients    If you would like to receive this communication electronically, please contact [email or web address] or [phone] to request more information on [employer] access.    For providers and/or their staff who would like to refer a patient to [facility], please contact us through our one-stop-shop provider referral line, [facility] [place], at [phone].    If you feel you have received this communication in error or would no longer like to receive these types of communications, please e-mail [email or web address]

## 2019-06-24 NOTE — PROGRESS NOTES
Subjective:       Patient ID: Regino Lawrence is a 73 y.o. female      Chief Complaint   Patient presents with    Diabetic Eye Exam    Blurred Vision     History of Present Illness  HPI     Pt referred by Dr Iglesias for RVO with ME     Pt notes that vision OS has been blurry.  Not sure exactly when it   started.  Va OD good.  Also notes a lot of tearing OU.  No f/f/pain OU.    DM since 2003  Hemispheric retinal vein occlusion with macular edema      Last edited by Hemanth Baker MD on 6/24/2019 11:13 AM. (History)        Imaging:    See report    Assessment/Plan:     1. Hemispheric retinal vein occlusion with macular edema of left eye  Symptomatic per pt but still has good acuity  Discussed obs vs monthly Avastin, outlined Rx and RBA  Pt wishes to have Rx    - Posterior Segment OCT Retina-Both eyes  - Posterior Segment OCT Retina-Both eyes; Future  - Prior Authorization Order    2. Corneal scar, right eye  Stable.  H/o childhood trauma.  Observe    Follow up in about 1 month (around 7/22/2019), or if symptoms worsen or fail to improve, for Injection Left eye, Avastin, OCT and INJECTION ONLY.     Patient identified.  Timeout performed.    Risks, benefits, and alternatives to treatment were discussed in detail with the patient, including bleeding/infection (endophthalmitis)/etc.  The patient voiced understanding and wished to proceed with the procedure.  See separate consent form.    Injection Procedure Note:  Diagnosis: RVO with ME Left Eye    Topical Proparacaine drop placed then topical 5% Betadine  Sterile gloves used, and sterile lid speculum placed.  5% Betadine placed at injection site again prior to injection.  Avastin 1.25mg in 0.05cc Injected inferotemporally 3.5-4mm posterior to the limbus.  Complications: None  Va at least CF at 5 feet post injection.  Retina, ONH, IOP normal after injection.    Followup as above.  Patient should return immediately PRN.  Retinal Detachment and Endophthalmitis  precautions given.

## 2019-06-24 NOTE — PATIENT INSTRUCTIONS

## 2019-06-25 ENCOUNTER — HOSPITAL ENCOUNTER (OUTPATIENT)
Dept: RADIOLOGY | Facility: OTHER | Age: 74
Discharge: HOME | End: 2019-06-25
Attending: PHYSICAL MEDICINE & REHABILITATION
Payer: MEDICARE

## 2019-06-25 ENCOUNTER — TELEPHONE (OUTPATIENT)
Dept: PAIN MEDICINE | Facility: CLINIC | Age: 74
End: 2019-06-25

## 2019-06-25 ENCOUNTER — OFFICE VISIT (OUTPATIENT)
Dept: SPINE | Facility: CLINIC | Age: 74
End: 2019-06-25
Attending: PHYSICAL MEDICINE & REHABILITATION
Payer: MEDICARE

## 2019-06-25 VITALS
HEIGHT: 60 IN | WEIGHT: 111 LBS | HEART RATE: 105 BPM | DIASTOLIC BLOOD PRESSURE: 79 MMHG | BODY MASS INDEX: 21.79 KG/M2 | SYSTOLIC BLOOD PRESSURE: 157 MMHG

## 2019-06-25 DIAGNOSIS — E11.22 DIABETES MELLITUS WITH STAGE 3 CHRONIC KIDNEY DISEASE: ICD-10-CM

## 2019-06-25 DIAGNOSIS — M25.552 LEFT HIP PAIN: ICD-10-CM

## 2019-06-25 DIAGNOSIS — M51.36 DDD (DEGENERATIVE DISC DISEASE), LUMBAR: ICD-10-CM

## 2019-06-25 DIAGNOSIS — M70.62 TROCHANTERIC BURSITIS OF LEFT HIP: ICD-10-CM

## 2019-06-25 DIAGNOSIS — M25.552 LEFT HIP PAIN: Primary | ICD-10-CM

## 2019-06-25 DIAGNOSIS — N18.30 DIABETES MELLITUS WITH STAGE 3 CHRONIC KIDNEY DISEASE: ICD-10-CM

## 2019-06-25 DIAGNOSIS — E11.9 DIABETES MELLITUS, TYPE 2: Chronic | ICD-10-CM

## 2019-06-25 PROCEDURE — 99999 PR PBB SHADOW E&M-EST. PATIENT-LVL III: ICD-10-PCS | Mod: PBBFAC,HCNC,, | Performed by: PHYSICAL MEDICINE & REHABILITATION

## 2019-06-25 PROCEDURE — 3077F PR MOST RECENT SYSTOLIC BLOOD PRESSURE >= 140 MM HG: ICD-10-PCS | Mod: HCNC,CPTII,S$GLB, | Performed by: PHYSICAL MEDICINE & REHABILITATION

## 2019-06-25 PROCEDURE — 1101F PT FALLS ASSESS-DOCD LE1/YR: CPT | Mod: HCNC,CPTII,S$GLB, | Performed by: PHYSICAL MEDICINE & REHABILITATION

## 2019-06-25 PROCEDURE — 3078F DIAST BP <80 MM HG: CPT | Mod: HCNC,CPTII,S$GLB, | Performed by: PHYSICAL MEDICINE & REHABILITATION

## 2019-06-25 PROCEDURE — 99214 OFFICE O/P EST MOD 30 MIN: CPT | Mod: HCNC,S$GLB,, | Performed by: PHYSICAL MEDICINE & REHABILITATION

## 2019-06-25 PROCEDURE — 99214 PR OFFICE/OUTPT VISIT, EST, LEVL IV, 30-39 MIN: ICD-10-PCS | Mod: HCNC,S$GLB,, | Performed by: PHYSICAL MEDICINE & REHABILITATION

## 2019-06-25 PROCEDURE — 3077F SYST BP >= 140 MM HG: CPT | Mod: HCNC,CPTII,S$GLB, | Performed by: PHYSICAL MEDICINE & REHABILITATION

## 2019-06-25 PROCEDURE — 3078F PR MOST RECENT DIASTOLIC BLOOD PRESSURE < 80 MM HG: ICD-10-PCS | Mod: HCNC,CPTII,S$GLB, | Performed by: PHYSICAL MEDICINE & REHABILITATION

## 2019-06-25 PROCEDURE — 99999 PR PBB SHADOW E&M-EST. PATIENT-LVL III: CPT | Mod: PBBFAC,HCNC,, | Performed by: PHYSICAL MEDICINE & REHABILITATION

## 2019-06-25 PROCEDURE — 1101F PR PT FALLS ASSESS DOC 0-1 FALLS W/OUT INJ PAST YR: ICD-10-PCS | Mod: HCNC,CPTII,S$GLB, | Performed by: PHYSICAL MEDICINE & REHABILITATION

## 2019-06-25 RX ORDER — METFORMIN HYDROCHLORIDE 1000 MG/1
TABLET ORAL
Qty: 180 TABLET | Refills: 1 | Status: SHIPPED | OUTPATIENT
Start: 2019-06-25 | End: 2020-03-02 | Stop reason: SDUPTHER

## 2019-06-25 NOTE — TELEPHONE ENCOUNTER
Contacted patient regarding message received from Dr. Biggs office. Even though patients has had several injections with Dr. Richards as direct referral, she is established with Dr. Osei in clinic setting and we would need to schedule her with a NP.  There was no answer, left voicemail asking to return the call to be scheduled.

## 2019-06-25 NOTE — TELEPHONE ENCOUNTER
----- Message from Matilda Brandt MA sent at 6/25/2019 12:25 PM CDT -----  Please contact the following patient and schedule an appt. Patient requested an appt sooner than the end of the month of July. Patient was seen by  but  has done her procedures prior to that visit and  would like for this patient's future appts to be scheduled with . Staff attempted to search for earlier dates for patient but there were none. Patient stated that she can be contacted at [phone].                                              Thank you,                                           Matilda

## 2019-06-25 NOTE — PROGRESS NOTES
Subjective:      Patient ID: Regino Lawrence is a 73 y.o. female.    Chief Complaint: Follow-up    Ms Lawrence is a 74 yo female here for follow up of low back pain and left hip pain.  s/p posterior C3-C7 laminectomy and fusion on 11/16/2015.  She was last seen by me on 2/25/2019 and has had low back for years.  She was sent to pain management for left hip joint injection done on 3/21/2019.  She has had previous back injections with no relief of left leg pain.      He feels like the injection in the hip was helpful for one month.  She got 100% relief of the pain.  She has been in more pain recently.  She feels like her back pain has been worse since the hip has been bothering her.  The pain is on the outside of the hip.  The pain is with walking and standing.  He feels like the pain pills help.  She was not able to go to PT after the hip pain got worse.  She feels like the injection gave her immediate relief.  She does have back pain, but the hip and leg pain got better with hip injection, not with RHIANNA    Interventional Therapies:   10/8/18 - left L5 transforaminal epidural steroid injection - good relief of back pain no relief of left lower extremity symptoms  12/6/18 - bilateral L5 transforaminal epidural steroid injection - good relief of back pain no relief of left lower extremity symptoms  3/21/2019 left hip injection    MRI lumbar 9/2018  The distal cord/conus demonstrates normal size and signal.  No evidence of osteomyelitis, marrow replacement process, or acute fracture.  No paraspinal masses.    At L1-2, there is asymmetric disc bulging to the right, resulting in mild right-sided neural foraminal narrowing.  No spinal canal stenosis.    L1-2 is unremarkable.    L2-3 demonstrates mild disc bulging slightly asymmetric to the left resulting in mild left-sided neural foraminal narrowing.  No spinal canal stenosis.    L4-5 demonstrates minimal anterolisthesis anterolisthesis.  There is mild facet arthropathy and  disc bulging.  This results in mild left-sided neural foraminal narrowing.  No spinal canal stenosis.    At L5-S1, there is a moderate sized left lateral recess/foraminal disc extrusion effacing the left neural foramen, likely impinging upon the exiting left L5 nerve root.  There is left-sided degenerative disc disease, noting disc height loss and sub endplate marrow edema.  There is moderate bilateral facet arthropathy.  No spinal canal stenosis.    Impression      Moderate size left foraminal disc extrusion at L5-S1, likely impinging upon the exiting left L5 nerve root.    Additional lumbar spondylosis, resulting in mild neural foraminal narrowing at L1-2, L2-3, and L4-5, as above.  No spinal canal stenosis.    X-ray hip 1/2019  No evidence for acute fracture, dislocation or destructive process.  Marginal osteophytes noted bilaterally, slightly more pronounced on the left.  Joint spaces appear maintained.  Degenerative changes of the lumbar spine are evident.  SI joints and sacrum demonstrate no acute finding.  Moderate amount of stool is seen throughout the colon.  Surrounding soft tissues appear unremarkable.    Ct cervical 7/2018  There is postsurgical change of posterior spinal fusion and laminectomies from C3 to C7 with bilateral lateral mass screws, stabilization rods, and bone material.  No surrounding lucency to suggest loosening.  No hardware fracture or other evidence of hardware failure.    There is straightening of the normal cervical lordosis.  The vertebral body heights appear relatively well-maintained.  There is no evidence of fracture or osseous lytic or blastic process.  There is intervertebral disc space height loss, most significant at C5-C6 and C6-C7. Focal degenerative changes are described below.    C2 -- C3: Posterior disc osteophyte complex and facet arthropathy without significant spinal canal stenosis or neuroforaminal narrowing.    C3 -- C4: Postsurgical change with posterior disc  osteophyte complex without significant spinal canal stenosis or neuroforaminal narrowing.    C4 -- C5: Postsurgical change with posterior disc osteophyte complex, mild uncovertebral spurring, and facet arthropathy resulting in mild left neuroforaminal narrowing.  No significant spinal canal stenosis.    C5 -- C6: Postsurgical change with posterior disc osteophyte complex, facet arthropathy, and uncovertebral spurring resulting in mild right and severe left neuroforaminal narrowing.  No significant spinal canal stenosis.    C6 -- C7: Postsurgical change with posterior disc osteophyte complex, uncovertebral spurring, and facet arthropathy resulting in mild bilateral neuroforaminal narrowing.  No significant spinal canal stenosis.    C7 -- T1: No significant disc abnormality, spinal canal stenosis, or neuroforaminal narrowing.    The spinal canal otherwise appears unremarkable, noting evaluation at postsurgical levels is somewhat limited due to metallic artifact.  No intradural abnormalities are identified.  Evaluation of the surrounding soft tissues reveals unchanged appearance of a small osseous body within the left posterior neck.  A 0.9 x 0.7 cm soft tissue opacity within the posterior left parotid gland.  This focus has slightly enlarged since CT 03/23/2016.  There is biapical pulmonary scarring.    Impression      Postsurgical change of C3-C7 laminectomy with posterior instrumented spinal fusion, unchanged.    Cervical spondylosis, most significant at C5-C6 with severe left and mild right neural foraminal narrowing at this level.  Additional multilevel neural foraminal narrowing as detailed above.  No significant spinal canal stenosis at any level.    0.9 cm left parotid soft tissue opacity, slightly enlarged since CT 03/23/2016 and possibly correlating with a prominent intraparotid lymph node or adenoma.  Further evaluation is recommended with dedicated parotid ultrasound.      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, without   long-term current use of insulin  No date: Depression  No date: Diabetes mellitus, type 2  as a child : Eye injury      Comment:  k-abrasion  od  No date: Hyperlipidemia  No date: Hypertension  No date: Hypothyroidism  No date: Immune deficiency disorder  No date: Immune disorder  2012: Myalgia and myositis  No date: Osteoporosis  No date: Polyneuropathy  No date: Renal manifestation of secondary diabetes mellitus  No date: Sarcoidosis  No date: Ulcer      Comment:  no cancer  No date: Urinary incontinence    Past Surgical History:  No date: CARPAL TUNNEL RELEASE      Comment:  Rt wrist  2015: CATARACT EXTRACTION W/  INTRAOCULAR LENS IMPLANT; Right      Comment:  Dr. Azevedo  2015: CATARACT EXTRACTION W/  INTRAOCULAR LENS IMPLANT; Left      Comment:  Dr. Azevedo  No date:  SECTION  No date: CHOLECYSTECTOMY  2016: COLPOCLEISIS-- [place]; N/A      Comment:  Performed by Meredith Becker DO at [facility] OR  2016: CYSTOSCOPY; N/A      Comment:  Performed by Meredith Becker DO at [facility] OR  2018: Injection,steroid,epidural,transforaminal approach      Bilateral L5; Bilateral      Comment:  Performed by Alfonso Richards MD at [facility] PAIN [facility]  10/8/2018: Injection,steroid,epidural,transforaminal approach  Left   L5-S1; Left      Comment:  Performed by Alfonso Richards MD at [facility]  2015: INSERTION-INTRAOCULAR LENS (IOL); Left      Comment:  Performed by Elio Azevedo MD at [facility] OR  2015: INSERTION-INTRAOCULAR LENS (IOL); Right      Comment:  Performed by Elio Azevedo MD at [facility] OR  2015: LAMINECTOMY-CERVICAL/FUSION-POSTERIOR C3-C7;  N/A      Comment:  Performed by Fab Conner MD at [facility] OR 2ND FLR  5/21/2015: PHACOEMULSIFICATION-ASPIRATION-CATARACT; Left      Comment:  Performed by Elio Azevedo MD at [facility] OR  4/30/2015: PHACOEMULSIFICATION-ASPIRATION-CATARACT; Right      Comment:  Performed by Elio Azevedo MD at [facility] OR  11/28/2016: REPAIR-POSTERIOR; N/A      Comment:  Performed by Meredith Becker DO at [facility] OR  11/28/2016: SLING-TRANSOBTERATOR TAPE; N/A      Comment:  Performed by Meredith Becker DO at [facility] OR  No date: TUBAL LIGATION    Review of patient's family history indicates:  Problem: Hypertension      Relation: Mother          Age of Onset: (Not Specified)  Problem: Cataracts      Relation: Mother          Age of Onset: (Not Specified)  Problem: No Known Problems      Relation: Father          Age of Onset: (Not Specified)  Problem: Hypertension      Relation: Maternal Grandmother          Age of Onset: (Not Specified)  Problem: Glaucoma      Relation: Sister          Age of Onset: (Not Specified)  Problem: Arthritis      Relation: Sister          Age of Onset: (Not Specified)  Problem: No Known Problems      Relation: Brother          Age of Onset: (Not Specified)  Problem: No Known Problems      Relation: Maternal Aunt          Age of Onset: (Not Specified)  Problem: No Known Problems      Relation: Maternal Uncle          Age of Onset: (Not Specified)  Problem: No Known Problems      Relation: Paternal Aunt          Age of Onset: (Not Specified)  Problem: No Known Problems      Relation: Paternal Uncle          Age of Onset: (Not Specified)  Problem: No Known Problems      Relation: Maternal Grandfather          Age of Onset: (Not Specified)  Problem: No Known Problems      Relation: Paternal Grandmother          Age of Onset: (Not Specified)  Problem: No Known Problems      Relation: Paternal Grandfather          Age of Onset: (Not Specified)  Problem: Kidney failure      Relation: Sister           Age of Onset: (Not Specified)  Problem: Hepatitis      Relation: Sister          Age of Onset: (Not Specified)  Problem: Cancer      Relation: Sister          Age of Onset: (Not Specified)          Comment: bone cancer   Problem: Immunodeficiency      Relation: Sister          Age of Onset: (Not Specified)  Problem: Lupus      Relation: Neg Hx          Age of Onset: (Not Specified)  Problem: Rheum arthritis      Relation: Neg Hx          Age of Onset: (Not Specified)  Problem: Amblyopia      Relation: Neg Hx          Age of Onset: (Not Specified)  Problem: Blindness      Relation: Neg Hx          Age of Onset: (Not Specified)  Problem: Diabetes      Relation: Neg Hx          Age of Onset: (Not Specified)  Problem: Macular degeneration      Relation: Neg Hx          Age of Onset: (Not Specified)  Problem: Retinal detachment      Relation: Neg Hx          Age of Onset: (Not Specified)  Problem: Strabismus      Relation: Neg Hx          Age of Onset: (Not Specified)  Problem: Stroke      Relation: Neg Hx          Age of Onset: (Not Specified)  Problem: Thyroid disease      Relation: Neg Hx          Age of Onset: (Not Specified)  Problem: Endometrial cancer      Relation: Neg Hx          Age of Onset: (Not Specified)  Problem: Vaginal cancer      Relation: Neg Hx          Age of Onset: (Not Specified)  Problem: Cervical cancer      Relation: Neg Hx          Age of Onset: (Not Specified)      Social History    Socioeconomic History      Marital status:       Spouse name: Not on file      Number of children: Not on file      Years of education: Not on file      Highest education level: Not on file    Social Needs      Financial resource strain: Not on file      Food insecurity - worry: Not on file      Food insecurity - inability: Not on file      Transportation needs - medical: Not on file      Transportation needs - non-medical: Not on file    Occupational History      Not on file    Tobacco Use      Smoking  status: Never Smoker      Smokeless tobacco: Never Used    Substance and Sexual Activity      Alcohol use: No      Drug use: No      Sexual activity: Yes        Partners: Male    Other Topics      Concerns:        Not on file    Social History Narrative      Not on file      Current Outpatient Medications:  ACCU-CHEK FASTCLIX LANCING DEV Kit, USE AS DIRECTED., Disp: 1 each, Rfl: 0  ACCU-CHEK SMARTVIEW TEST STRIP Strp, TEST  ONCE  A  DAY, Disp: 100 strip, Rfl: 11  ALCOHOL ANTISEPTIC PADS (ALCOHOL PREP PADS TOP), , Disp: , Rfl:   amLODIPine (NORVASC) 10 MG tablet, Take 1 tablet (10 mg total) by mouth once daily., Disp: 90 tablet, Rfl: 1  blood-glucose meter kit, Use as instructed, Disp: 1 each, Rfl: 0  calcium carbonate (OS-MALCOLM) 600 mg calcium (1,500 mg) Tab, Take 1 tablet by mouth 2 (two) times daily. , Disp: , Rfl:   carvedilol (COREG) 25 MG tablet, Take 1 tablet (25 mg total) by mouth 2 (two) times daily., Disp: 180 tablet, Rfl: 3  cloNIDine (CATAPRES) 0.3 MG tablet, Take 1 tablet (0.3 mg total) by mouth 3 (three) times daily., Disp: 270 tablet, Rfl: 1  epoetin german (PROCRIT INJ), Inject 1,000 Units as directed., Disp: , Rfl:   fenofibrate 160 MG Tab, Take 1 tablet (160 mg total) by mouth once daily., Disp: 90 tablet, Rfl: 1  fexofenadine (ALLEGRA ALLERGY) 180 MG tablet, Take 180 mg by mouth daily as needed. , Disp: , Rfl:   folic acid (FOLVITE) 1 MG tablet, Take 1 tablet (1 mg total) by mouth once daily., Disp: 90 tablet, Rfl: 0  gabapentin (NEURONTIN) 400 MG capsule, Take 1 capsule (400 mg total) by mouth 2 (two) times daily., Disp: 180 capsule, Rfl: 1  HYDROcodone-acetaminophen (NORCO) 5-325 mg per tablet, Take 1 tablet by mouth every 6 (six) hours as needed., Disp: 90 tablet, Rfl: 0  levothyroxine (SYNTHROID) 50 MCG tablet, TAKE 1 TABLET BY MOUTH ONCE DAILY BEFORE BREAKFAST, Disp: 90 tablet, Rfl: 1  metFORMIN (GLUCOPHAGE) 1000 MG tablet, Take 1 tablet (1,000 mg total) by mouth 2 (two) times daily with meals.,  Disp: 180 tablet, Rfl: 1  methotrexate 2.5 MG Tab, TAKE 6 TABLETS BY MOUTH EVERY 7 DAYS, Disp: 72 tablet, Rfl: 0  predniSONE (DELTASONE) 10 MG tablet, , Disp: , Rfl:     No current facility-administered medications for this visit.       Review of patient's allergies indicates:  No Known Allergies          Review of Systems   Constitution: Negative for weight gain and weight loss.   Cardiovascular: Negative for chest pain.   Respiratory: Positive for shortness of breath.    Musculoskeletal: Positive for back pain (left leg) and joint pain (left foot). Negative for joint swelling.   Gastrointestinal: Positive for nausea. Negative for abdominal pain, bowel incontinence and vomiting.   Genitourinary: Negative for bladder incontinence.   Neurological: Negative for numbness and paresthesias.         Objective:        General: Regino is well-developed, well-nourished, appears stated age, in no acute distress, alert and oriented to time, place and person.     General    Vitals reviewed.  Constitutional: She is oriented to person, place, and time. She appears well-developed and well-nourished.   HENT:   Head: Normocephalic and atraumatic.   Pulmonary/Chest: Effort normal.   Neurological: She is alert and oriented to person, place, and time.   Psychiatric: She has a normal mood and affect. Her behavior is normal. Judgment and thought content normal.     General Musculoskeletal Exam   Gait: antalgic (holds left leg in front of her and does not want to put weight or extend hip)     Left Hip Exam     Tenderness  Also left side trochanteric tenderness.    Range of Motion   Extension: -10   External rotation: 40   Internal rotation: 10       Back (L-Spine & T-Spine) / Neck (C-Spine) Exam     Tenderness   The patient is tender to palpation of the left side trochanteric. Left paramedian tenderness of the Sacrum.     Back (L-Spine & T-Spine) Range of Motion   Extension: 0   Flexion: 70   Lateral bend right: 10   Lateral bend left:  10   Rotation right: 30   Rotation left: 30     Spinal Sensation   Right Side Sensation  C-Spine Level: normal   L-Spine Level: normal  S-Spine Level: normal  Left Side Sensation  C-Spine Level: normal  L-Spine Level: normal  S-Spine Level: normal    Back (L-Spine & T-Spine) Tests   Right Side Tests  Straight leg raise:      Sitting SLR: > 70 degrees      Left Side Tests  Straight leg raise:     Sitting SLR: > 70 degrees          Other She has no scoliosis .  Spinal Kyphosis:  Absent      Muscle Strength   Right Upper Extremity   Biceps: 5/5/5   Deltoid:  5/5  Triceps:  5/5  Wrist extension: 5/5/5   Finger Flexors:  5/5  Left Upper Extremity  Biceps: 5/5/5   Deltoid:  5/5  Triceps:  5/5  Wrist extension: 5/5/5   Finger Flexors:  5/5  Right Lower Extremity   Hip Flexion: 5/5   Quadriceps:  5/5   Anterior tibial:  5/5/5  EHL:  5/5  Left Lower Extremity   Hip Flexion: 5/5   Quadriceps:  5/5   Anterior tibial:  5/5/5   EHL:  5/5    Reflexes     Left Side  Biceps:  2+  Triceps:  2+  Brachioradialis:  2+  Quadriceps:  2+  Achilles:  2+  Left Silverman's Sign:  Absent  Babinski Sign:  absent    Right Side   Biceps:  2+  Triceps:  2+  Brachioradialis:  2+  Quadriceps:  2+  Achilles:  2+  Right Silverman's Sign:  absent  Babinski Sign:  absent    Vascular Exam     Right Pulses        Carotid:                  2+    Left Pulses        Carotid:                  2+              Assessment:       1. Left hip pain    2. Trochanteric bursitis of left hip    3. DDD (degenerative disc disease), lumbar           Plan:       Orders Placed This Encounter    Procedure Order to [facility]    X-Ray Hip 2 or 3 views Left    Ambulatory consult to Pain Clinic     1.   Left hip joint injection on 3/21 was helpful for over a month.  She got 100% worth of relief.  She does have some degenerative changes in her hip  2.  She has back pain and DDD and she has had injections in her back which help back pain but not the left leg pain.   The injection in her hip helped the left leg pain.  I think the leg pain is coming from hip.  We discussed going back to ortho since the hip joint injection helps  3.  Repeat left hip joint injection with pain management  4.  Appointment with Dr. Richards to consider other injections and hip vs back.  She has seen Dr. Osei but Dr. Richards has done all of her procedures  5.  repeat hip X-ray  6.  RTC 3 months          Follow-up: Follow up in about 3 months (around 9/25/2019). If there are any questions prior to this, the patient was instructed to contact the office.

## 2019-06-25 NOTE — TELEPHONE ENCOUNTER
----- Message from Matilda Brandt MA sent at 6/25/2019 12:25 PM CDT -----  Please contact the following patient and schedule an appt. Patient requested an appt sooner than the end of the month of July. Patient was seen by  but  has done her procedures prior to that visit and  would like for this patient's future appts to be scheduled with . Staff attempted to search for earlier dates for patient but there were none. Patient stated that she can be contacted at [phone].                                              Thank you,                                           Matilda

## 2019-06-25 NOTE — TELEPHONE ENCOUNTER
Staff left a voicemail for patient about a message they have received about getting patient schedule with provider Dr. Richards.    Staff asked patient to please give a call back so they can see about getting her seen with provider.

## 2019-06-26 ENCOUNTER — HOSPITAL ENCOUNTER (OUTPATIENT)
Dept: RADIOLOGY | Facility: HOSPITAL | Age: 74
Discharge: HOME | End: 2019-06-26
Attending: PHYSICAL MEDICINE & REHABILITATION
Payer: MEDICARE

## 2019-06-26 ENCOUNTER — TELEPHONE (OUTPATIENT)
Dept: PAIN MEDICINE | Facility: CLINIC | Age: 74
End: 2019-06-26

## 2019-06-26 DIAGNOSIS — M25.552 LEFT HIP PAIN: Primary | ICD-10-CM

## 2019-06-26 PROCEDURE — 73502 X-RAY EXAM HIP UNI 2-3 VIEWS: CPT | Mod: TC,HCNC,LT

## 2019-06-26 PROCEDURE — 73502 X-RAY EXAM HIP UNI 2-3 VIEWS: CPT | Mod: 26,HCNC,LT, | Performed by: RADIOLOGY

## 2019-06-26 PROCEDURE — 73502 XR HIP 2 VIEW LEFT: ICD-10-PCS | Mod: 26,HCNC,LT, | Performed by: RADIOLOGY

## 2019-06-27 ENCOUNTER — OFFICE VISIT (OUTPATIENT)
Dept: HEMATOLOGY/ONCOLOGY | Facility: CLINIC | Age: 74
End: 2019-06-27
Payer: MEDICARE

## 2019-06-27 VITALS
TEMPERATURE: 98 F | HEIGHT: 61 IN | BODY MASS INDEX: 23.81 KG/M2 | DIASTOLIC BLOOD PRESSURE: 70 MMHG | OXYGEN SATURATION: 94 % | WEIGHT: 126.13 LBS | HEART RATE: 80 BPM | SYSTOLIC BLOOD PRESSURE: 139 MMHG

## 2019-06-27 DIAGNOSIS — D63.1 ANEMIA IN CHRONIC KIDNEY DISEASE, UNSPECIFIED CKD STAGE: Primary | ICD-10-CM

## 2019-06-27 DIAGNOSIS — N18.9 ANEMIA IN CHRONIC KIDNEY DISEASE, UNSPECIFIED CKD STAGE: Primary | ICD-10-CM

## 2019-06-27 DIAGNOSIS — Z53.1 REFUSAL OF BLOOD TRANSFUSIONS AS PATIENT IS JEHOVAH'S WITNESS: ICD-10-CM

## 2019-06-27 PROCEDURE — 3075F SYST BP GE 130 - 139MM HG: CPT | Mod: HCNC,CPTII,S$GLB, | Performed by: INTERNAL MEDICINE

## 2019-06-27 PROCEDURE — 99213 PR OFFICE/OUTPT VISIT, EST, LEVL III, 20-29 MIN: ICD-10-PCS | Mod: HCNC,S$GLB,, | Performed by: INTERNAL MEDICINE

## 2019-06-27 PROCEDURE — 99999 PR PBB SHADOW E&M-EST. PATIENT-LVL IV: ICD-10-PCS | Mod: PBBFAC,HCNC,, | Performed by: INTERNAL MEDICINE

## 2019-06-27 PROCEDURE — 99213 OFFICE O/P EST LOW 20 MIN: CPT | Mod: HCNC,S$GLB,, | Performed by: INTERNAL MEDICINE

## 2019-06-27 PROCEDURE — 99999 PR PBB SHADOW E&M-EST. PATIENT-LVL IV: CPT | Mod: PBBFAC,HCNC,, | Performed by: INTERNAL MEDICINE

## 2019-06-27 PROCEDURE — 3075F PR MOST RECENT SYSTOLIC BLOOD PRESS GE 130-139MM HG: ICD-10-PCS | Mod: HCNC,CPTII,S$GLB, | Performed by: INTERNAL MEDICINE

## 2019-06-27 PROCEDURE — 1101F PR PT FALLS ASSESS DOC 0-1 FALLS W/OUT INJ PAST YR: ICD-10-PCS | Mod: HCNC,CPTII,S$GLB, | Performed by: INTERNAL MEDICINE

## 2019-06-27 PROCEDURE — 3078F DIAST BP <80 MM HG: CPT | Mod: HCNC,CPTII,S$GLB, | Performed by: INTERNAL MEDICINE

## 2019-06-27 PROCEDURE — 1101F PT FALLS ASSESS-DOCD LE1/YR: CPT | Mod: HCNC,CPTII,S$GLB, | Performed by: INTERNAL MEDICINE

## 2019-06-27 PROCEDURE — 3078F PR MOST RECENT DIASTOLIC BLOOD PRESSURE < 80 MM HG: ICD-10-PCS | Mod: HCNC,CPTII,S$GLB, | Performed by: INTERNAL MEDICINE

## 2019-06-27 NOTE — Clinical Note
HOLD PROCRIT TOMORROWCont cbc q 2wks begin 2wks from now -stadnding ordersFe studies prior to f/u F/u here or [facility] if possible

## 2019-06-27 NOTE — PROGRESS NOTES
Subjective:       Patient ID: Regino Lawrence is a 73 y.o. female.    Chief Complaint: Follow-up  Diagnosis: Anemia in CKD  Patient is a [employer]  .  HPI    The patient is seen today for chronic anemia in CKD. The patient reports that she has been diagnosed with JOLLY in the past.  She has been on oral iron supplementation therapy, but could not tolerate or did not respond, she is uncertain.  She is followed by GI and has undergone a colonoscopy earlier this year and was diagnosed with hemorrhoids for which she underwent banding procedure.  No melena, hematochezia,change in bowel habits.  She has also been diagnosed with B12 deficiency in the past, but reports she did not respond to B12 injections. No history of blood transfusions.  She is a [employer].  She reports that she remembers getting injections in the 1970s when her blood count was low.        She is followed by Rheumatology for history of sarcoidosis with associated myopathy and arthropathy.   .She has been treated in the  past with methotrexate and Plaquenil, both of which were ineffective  Cellcept and imuran caused some unknown side effect.   Colchicine  held due to low WBC       Chronic diffuse arthralgias-stable     She continues to undergo Procrit therapy q 2wks  She undergoes intermittent IV iron therapy  Hb 11.1g/dl   No new issues  No SOB/CP  Appetite and weight stable         She is followed by pain mgmt for chronic hip pain   She was diagnosed with bursitis of hip and recently underwent steroid inj  history of cervical spinal stenosis s/p posterior C3-C7 laminectomy and fusion on 11/16/15 by Dr. Conner.   She was referred to Dr. Richards for a left L5 transforaminal injection which was completed on 10/8/18.      CBC  reveals wbc 5200/mm3  Hb 11.1  g/dl Hct 35.5% Plt ct 305k    Patient was in the ED 07/27/2018 and was seen on 7/17  at [facility] for [facility] issues  She is followed by Neurosurgery for cervical spinal stenosis s/p posterior C3-C7  "laminectomy and fusion          PAST MEDICAL HISTORY:  Acid reflux, alopecia, anemia, anxiety disorder, chronic  kidney disease, depression, diabetes mellitus type 2, hyperlipidemia,  hypertension, hypothyroidism, osteoporosis, sarcoidosis.    PAST SURGICAL HISTORY:  Cholecystectomy, , tubal ligation, carpal tunnel release, cataracts.    FAMILY HISTORY: Unremarkable for cancer. Significant for HTN.       Review of Systems   Constitutional: Positive for fatigue. Negative for activity change and appetite change.   HENT: Negative for hearing loss and nosebleeds.    Eyes: Negative for visual disturbance.   Respiratory: Negative for cough and shortness of breath.    Cardiovascular: Negative for chest pain and leg swelling.   Gastrointestinal: Negative for abdominal pain, constipation, diarrhea and nausea.   Genitourinary: Negative for flank pain and urgency.   Musculoskeletal: Positive for arthralgias and back pain. Negative for gait problem and joint swelling.   Skin: Negative for rash.        No petechiae, ecchymoses   Neurological: Negative for light-headedness and headaches.   Hematological: Negative for adenopathy. Does not bruise/bleed easily.       Objective:       .  Vitals:    19 1358 19 1403   BP: (!) 146/70 139/70   BP Location: Right arm Left arm   Patient Position: Sitting Sitting   BP Method: Medium (Automatic) Medium (Manual)   Pulse: 80    Temp: 97.7 °F (36.5 °C)    TempSrc: Oral    SpO2: (!) 94%    Weight: 57.2 kg (126 lb 1.7 oz)    Height: 5' 1" (1.549 m)        Physical Exam   Constitutional: She is oriented to person, place, and time. She appears well-developed and well-nourished.   HENT:   Head: Normocephalic.   Mouth/Throat: Oropharynx is clear and moist. No oropharyngeal exudate.   Eyes: Pupils are equal, round, and reactive to light. Conjunctivae and lids are normal. No scleral icterus.   Neck: Normal range of motion. Neck supple. No thyromegaly present.   Cardiovascular: " Normal rate, regular rhythm and normal heart sounds.   No murmur heard.  Pulmonary/Chest: Breath sounds normal. She has no wheezes. She has no rales.   Abdominal: Soft. Bowel sounds are normal. She exhibits no distension and no mass. There is no hepatosplenomegaly. There is no tenderness. There is no rebound and no guarding.   Musculoskeletal: Normal range of motion. She exhibits no edema or tenderness.   Neurological: She is alert and oriented to person, place, and time. No cranial nerve deficit. Coordination normal.   Skin: Skin is warm and dry. No ecchymosis, no petechiae and no rash noted. No erythema.   Psychiatric: She has a normal mood and affect.             Lab Results   Component Value Date    WBC 5.20 06/20/2019    HGB 11.1 (L) 06/20/2019    HCT 35.5 (L) 06/20/2019     (H) 06/20/2019     06/20/2019     Lab Results   Component Value Date    IRON 94 06/20/2019    TIBC 379 06/20/2019    FERRITIN 267 06/20/2019     SPEP-nl          CT renal 3/6/2017   IMPRESSION:  1.  No renal, ureteral or bladder calculi.  No hydronephrosis or ureterectasis.  2.  Poorly distended bladder.  Mild bladder wall prominence.  Mild cystitis cannot be   excluded.  3.  Moderate constipation.  Normal appendix      Lab Results   Component Value Date    IRON 94 06/20/2019    TIBC 379 06/20/2019    FERRITIN 267 06/20/2019       Assessment:       1. Anemia in chronic kidney disease, unspecified CKD stage    2. Refusal of blood transfusions as patient is [employer]        Plan:   1-2  Pt clinically stable  Pt is a [employer] and declines/not interested in blood and blood products due to Caodaism beliefs  Hb 11.1   g/dl   Ferritin 267  Fe sats 25  Cont procrit to 20,000u q 2wk ( lab parameters  Pt followed by Nephrology . It has been determinedearly CKD stage III, suspect due to age-related renal nephron loss, along with possible diabetic nephropathy v. hypertensive nephrosclerosis  Hold off on procrit therapy  this week  CBC q2wks STANDING    Follow-up with PCP for med mgmt      Advance Care Planning     Power of   I initiated the process of advance care planning today and explained the importance of this process to the patient.  I introduced the concept of advance directives to the patient, as well. Then the patient received detailed information about the importance of designating a Health Care Power of  (HCPOA). She was also instructed to communicate with this person about their wishes for future healthcare, should she become sick and lose decision-making capacity. The patient has  previously appointed a HCPOA. Pt completed in 2015             F/u 2 mos with Fe studies    CC: Micaela Mendoza M.D.

## 2019-07-01 ENCOUNTER — TELEPHONE (OUTPATIENT)
Dept: OPHTHALMOLOGY | Facility: CLINIC | Age: 74
End: 2019-07-01

## 2019-07-01 NOTE — TELEPHONE ENCOUNTER
----- Message from Jackie Tabor sent at 7/1/2019  2:32 PM CDT -----  Contact: Regino Granados calling to get a copy of her latest eyeglasses prescription.  She would like to  today if possible.  She can be reached at [phone]

## 2019-07-05 ENCOUNTER — PATIENT MESSAGE (OUTPATIENT)
Dept: FAMILY MEDICINE | Facility: CLINIC | Age: 74
End: 2019-07-05

## 2019-07-05 DIAGNOSIS — E03.9 HYPOTHYROIDISM, UNSPECIFIED TYPE: ICD-10-CM

## 2019-07-05 DIAGNOSIS — M51.36 DEGENERATIVE DISC DISEASE, LUMBAR: ICD-10-CM

## 2019-07-05 RX ORDER — LEVOTHYROXINE SODIUM 50 UG/1
TABLET ORAL
Qty: 90 TABLET | Refills: 1 | Status: SHIPPED | OUTPATIENT
Start: 2019-07-05 | End: 2020-01-09 | Stop reason: SDUPTHER

## 2019-07-05 RX ORDER — LEVOTHYROXINE SODIUM 50 UG/1
TABLET ORAL
Qty: 90 TABLET | Refills: 1 | Status: CANCELLED | OUTPATIENT
Start: 2019-07-05

## 2019-07-05 RX ORDER — HYDROCODONE BITARTRATE AND ACETAMINOPHEN 5; 325 MG/1; MG/1
1 TABLET ORAL EVERY 6 HOURS PRN
Qty: 90 TABLET | Refills: 0 | Status: SHIPPED | OUTPATIENT
Start: 2019-07-05 | End: 2019-08-15 | Stop reason: SDUPTHER

## 2019-07-11 ENCOUNTER — LAB VISIT (OUTPATIENT)
Dept: LAB | Facility: HOSPITAL | Age: 74
End: 2019-07-11
Attending: INTERNAL MEDICINE
Payer: MEDICARE

## 2019-07-11 ENCOUNTER — INFUSION (OUTPATIENT)
Dept: INFUSION THERAPY | Facility: HOSPITAL | Age: 74
End: 2019-07-11
Attending: INTERNAL MEDICINE
Payer: MEDICARE

## 2019-07-11 VITALS
OXYGEN SATURATION: 96 % | TEMPERATURE: 98 F | DIASTOLIC BLOOD PRESSURE: 62 MMHG | SYSTOLIC BLOOD PRESSURE: 133 MMHG | RESPIRATION RATE: 18 BRPM | HEART RATE: 85 BPM

## 2019-07-11 DIAGNOSIS — D63.1 ANEMIA IN CHRONIC KIDNEY DISEASE, UNSPECIFIED CKD STAGE: Primary | ICD-10-CM

## 2019-07-11 DIAGNOSIS — N18.9 ANEMIA IN CHRONIC KIDNEY DISEASE, UNSPECIFIED CKD STAGE: Primary | ICD-10-CM

## 2019-07-11 DIAGNOSIS — D63.1 ANEMIA IN CHRONIC KIDNEY DISEASE, UNSPECIFIED CKD STAGE: ICD-10-CM

## 2019-07-11 DIAGNOSIS — N18.9 ANEMIA IN CHRONIC KIDNEY DISEASE, UNSPECIFIED CKD STAGE: ICD-10-CM

## 2019-07-11 LAB
BASOPHILS # BLD AUTO: 0.01 K/UL (ref 0–0.2)
BASOPHILS NFR BLD: 0.2 % (ref 0–1.9)
DIFFERENTIAL METHOD: ABNORMAL
EOSINOPHIL # BLD AUTO: 0.1 K/UL (ref 0–0.5)
EOSINOPHIL NFR BLD: 1.3 % (ref 0–8)
ERYTHROCYTE [DISTWIDTH] IN BLOOD BY AUTOMATED COUNT: 13.6 % (ref 11.5–14.5)
HCT VFR BLD AUTO: 33.2 % (ref 37–48.5)
HGB BLD-MCNC: 10.7 G/DL (ref 12–16)
LYMPHOCYTES # BLD AUTO: 1.3 K/UL (ref 1–4.8)
LYMPHOCYTES NFR BLD: 28.8 % (ref 18–48)
MCH RBC QN AUTO: 34 PG (ref 27–31)
MCHC RBC AUTO-ENTMCNC: 32.2 G/DL (ref 32–36)
MCV RBC AUTO: 105 FL (ref 82–98)
MONOCYTES # BLD AUTO: 0.3 K/UL (ref 0.3–1)
MONOCYTES NFR BLD: 7.4 % (ref 4–15)
NEUTROPHILS # BLD AUTO: 2.9 K/UL (ref 1.8–7.7)
NEUTROPHILS NFR BLD: 62.7 % (ref 38–73)
PLATELET # BLD AUTO: 269 K/UL (ref 150–350)
PMV BLD AUTO: 8.7 FL (ref 9.2–12.9)
RBC # BLD AUTO: 3.15 M/UL (ref 4–5.4)
WBC # BLD AUTO: 4.62 K/UL (ref 3.9–12.7)

## 2019-07-11 PROCEDURE — 85025 COMPLETE CBC W/AUTO DIFF WBC: CPT | Mod: HCNC

## 2019-07-11 PROCEDURE — 96372 THER/PROPH/DIAG INJ SC/IM: CPT | Mod: HCNC

## 2019-07-11 PROCEDURE — 36415 COLL VENOUS BLD VENIPUNCTURE: CPT | Mod: HCNC

## 2019-07-11 PROCEDURE — 63600175 PHARM REV CODE 636 W HCPCS: Mod: HCNC,EC,JG | Performed by: INTERNAL MEDICINE

## 2019-07-11 RX ADMIN — ERYTHROPOIETIN 20000 UNITS: 20000 INJECTION, SOLUTION INTRAVENOUS; SUBCUTANEOUS at 11:07

## 2019-07-11 NOTE — PLAN OF CARE
Problem: Adult Inpatient Plan of Care  Goal: Plan of Care Review  Outcome: Ongoing (interventions implemented as appropriate)  Labs reviewed. Hgb 10.7 today. Patient reports increased fatigue and FAUSTIN. Received Procrit injection. Tolerated well. VSS. Patient received discharge instructions and follow up appointments. Verbalized understanding and ambulated with walker off unit accompanied by daughter.

## 2019-07-16 ENCOUNTER — PATIENT MESSAGE (OUTPATIENT)
Dept: RHEUMATOLOGY | Facility: CLINIC | Age: 74
End: 2019-07-16

## 2019-07-16 DIAGNOSIS — D86.9 SARCOID: Primary | ICD-10-CM

## 2019-07-16 DIAGNOSIS — M79.10 MYALGIA: ICD-10-CM

## 2019-07-16 RX ORDER — TIZANIDINE HYDROCHLORIDE 2 MG/1
2-4 CAPSULE, GELATIN COATED ORAL EVERY 8 HOURS PRN
Qty: 180 CAPSULE | Refills: 1 | Status: SHIPPED | OUTPATIENT
Start: 2019-07-16 | End: 2019-07-30

## 2019-07-18 ENCOUNTER — PATIENT MESSAGE (OUTPATIENT)
Dept: RHEUMATOLOGY | Facility: CLINIC | Age: 74
End: 2019-07-18

## 2019-07-22 ENCOUNTER — PATIENT OUTREACH (OUTPATIENT)
Dept: ADMINISTRATIVE | Facility: OTHER | Age: 74
End: 2019-07-22

## 2019-07-22 ENCOUNTER — HOSPITAL ENCOUNTER (OUTPATIENT)
Facility: OTHER | Age: 74
Discharge: HOME | End: 2019-07-22
Attending: ANESTHESIOLOGY | Admitting: ANESTHESIOLOGY
Payer: MEDICARE

## 2019-07-22 VITALS
OXYGEN SATURATION: 97 % | RESPIRATION RATE: 20 BRPM | HEIGHT: 60 IN | DIASTOLIC BLOOD PRESSURE: 74 MMHG | WEIGHT: 127 LBS | HEART RATE: 92 BPM | BODY MASS INDEX: 24.94 KG/M2 | SYSTOLIC BLOOD PRESSURE: 136 MMHG

## 2019-07-22 DIAGNOSIS — M16.0 PRIMARY OSTEOARTHRITIS OF BOTH HIPS: Primary | ICD-10-CM

## 2019-07-22 DIAGNOSIS — M16.9 DEGENERATIVE JOINT DISEASE (DJD) OF HIP: ICD-10-CM

## 2019-07-22 DIAGNOSIS — D64.9 ANEMIA: Primary | ICD-10-CM

## 2019-07-22 LAB — POCT GLUCOSE: 141 MG/DL (ref 70–110)

## 2019-07-22 PROCEDURE — 63600175 PHARM REV CODE 636 W HCPCS: Mod: HCNC | Performed by: ANESTHESIOLOGY

## 2019-07-22 PROCEDURE — 77002 PR FLUOROSCOPIC GUIDANCE NEEDLE PLACEMENT: ICD-10-PCS | Mod: 26,HCNC,, | Performed by: ANESTHESIOLOGY

## 2019-07-22 PROCEDURE — 20610 DRAIN/INJ JOINT/BURSA W/O US: CPT | Mod: HCNC,LT,, | Performed by: ANESTHESIOLOGY

## 2019-07-22 PROCEDURE — 20610 DRAIN/INJ JOINT/BURSA W/O US: CPT | Mod: HCNC | Performed by: ANESTHESIOLOGY

## 2019-07-22 PROCEDURE — 20610 PR DRAIN/INJECT LARGE JOINT/BURSA: ICD-10-PCS | Mod: HCNC,LT,, | Performed by: ANESTHESIOLOGY

## 2019-07-22 PROCEDURE — 77002 NEEDLE LOCALIZATION BY XRAY: CPT | Mod: HCNC | Performed by: ANESTHESIOLOGY

## 2019-07-22 PROCEDURE — 82947 ASSAY GLUCOSE BLOOD QUANT: CPT | Mod: HCNC | Performed by: ANESTHESIOLOGY

## 2019-07-22 PROCEDURE — 25000003 PHARM REV CODE 250: Mod: HCNC | Performed by: ANESTHESIOLOGY

## 2019-07-22 PROCEDURE — 25500020 PHARM REV CODE 255: Mod: HCNC | Performed by: ANESTHESIOLOGY

## 2019-07-22 PROCEDURE — 77002 NEEDLE LOCALIZATION BY XRAY: CPT | Mod: 26,HCNC,, | Performed by: ANESTHESIOLOGY

## 2019-07-22 RX ORDER — LIDOCAINE HYDROCHLORIDE 10 MG/ML
INJECTION INFILTRATION; PERINEURAL
Status: DISCONTINUED | OUTPATIENT
Start: 2019-07-22 | End: 2019-07-22 | Stop reason: HOSPADM

## 2019-07-22 RX ORDER — ALPRAZOLAM 0.5 MG/1
0.5 TABLET ORAL NIGHTLY PRN
Status: DISCONTINUED | OUTPATIENT
Start: 2019-07-22 | End: 2019-07-22 | Stop reason: HOSPADM

## 2019-07-22 RX ORDER — SODIUM CHLORIDE 9 MG/ML
500 INJECTION, SOLUTION INTRAVENOUS CONTINUOUS
Status: DISCONTINUED | OUTPATIENT
Start: 2019-07-22 | End: 2019-07-22 | Stop reason: HOSPADM

## 2019-07-22 RX ORDER — BUPIVACAINE HYDROCHLORIDE 2.5 MG/ML
INJECTION, SOLUTION EPIDURAL; INFILTRATION; INTRACAUDAL
Status: DISCONTINUED | OUTPATIENT
Start: 2019-07-22 | End: 2019-07-22 | Stop reason: HOSPADM

## 2019-07-22 RX ORDER — TRIAMCINOLONE ACETONIDE 40 MG/ML
INJECTION, SUSPENSION INTRA-ARTICULAR; INTRAMUSCULAR
Status: DISCONTINUED | OUTPATIENT
Start: 2019-07-22 | End: 2019-07-22 | Stop reason: HOSPADM

## 2019-07-22 RX ADMIN — ALPRAZOLAM 0.5 MG: 0.5 TABLET ORAL at 03:07

## 2019-07-22 NOTE — H&P
HPI  Patient presenting for Procedure(s) (LRB):  Injection, Joint FLUOROSCOPIC JOINT INJECTION (HIP INJECTION) LEFT HIP (Left)     Patient on Anti-coagulation No    No health changes since previous encounter    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, without long-term current use of insulin     Depression     Diabetes mellitus, type 2     Eye injury as a child     k-abrasion  od    Hyperlipidemia     Hypertension     Hypothyroidism     Immune deficiency disorder     Immune disorder     Myalgia and myositis 2012    Osteoporosis     Polyneuropathy     Renal manifestation of secondary diabetes mellitus     Sarcoidosis     Ulcer     no cancer    Urinary incontinence      Past Surgical History:   Procedure Laterality Date    CARPAL TUNNEL RELEASE      Rt wrist    CATARACT EXTRACTION W/  INTRAOCULAR LENS IMPLANT Right 2015    Dr. Azevedo    CATARACT EXTRACTION W/  INTRAOCULAR LENS IMPLANT Left 2015    Dr. Azevedo     SECTION      CHOLECYSTECTOMY      COLPOCLEISIS-- [place] N/A 2016    Performed [facility] at [facility] [place]    CYSTOSCOPY N/A 2016    Performed by Meredith Becker DO at [facility] [place]    Injection, Joint  fLUOROSCOPIC jOINT iNJECTION (hIP iNJECTION) LEFT ROCH BURSA AS WELL LEFT TROCHANTERIC BURSA Left 3/21/2019    Performed by Alfonso Richards MD at [facility] [place] PAIN MGT    Injection,steroid,epidural,transforaminal approach    Bilateral L5 Bilateral 2018    Performed by Alfonso Richards MD at [facility] [place] PAIN MGT    Injection,steroid,epidural,transforaminal approach  Left L5-S1 Left 10/8/2018    Performed by Alfonso Richards MD at [facility] [place] PAIN T     INSERTION-INTRAOCULAR LENS (IOL) Left 5/21/2015    Per[facility] by [facility] at [facility] OR    INSERTION-INTRAOCULAR LENS (IOL) Right 4/30/2015    Performed by [facility] at [facility] OR    LAMINECTOMY-CERVICAL/FUSION-POSTERIOR C3-C7 N/A 11/16/2015    Performed by Fab Con[facility] at [facility] OR 2ND FLR    PHACOEMULSIFICATION-ASPIRATION-CATARACT Left 5/21/2015    Performed by [facility]edo [facility] at [facility] OR    PHACOEMULSIFICATION-ASPIRATION-CATARACT Right 4/30/2015    Performed by [facility]edo [facility] at [facility] OR    REPAIR-POSTERIOR N/A 11/28/2016    [facility] at [facility] OR    SLING-TRANSOBTERATOR TAPE N/A 11/28/2016    Per[facility] by [facility] Becker [facility] at [facility] OR    TUBAL LIGATION       Review of patient's allergies indicates:   Allergen Reactions    Azathioprine Shortness Of Breath and Other (See Comments)     Fatigue      Current Facility-Administered Medications   Medication    0.9%  NaCl infusion    ALPRAZolam tablet 0.5 mg       PMHx, PSHx, Allergies, Medications reviewed in epic    ROS negative except pain complaints in HPI    OBJECTIVE:    BP (!) 207/106 (BP Location: Right arm, Patient Position: Sitting)   Pulse 95   Resp 18   Ht 5' (1.524 m)   Wt 57.6 kg (127 lb)   LMP  (LMP Unknown)   SpO2 97%   BMI 24.80 kg/m²     PHYSICAL EXAMINATION:    GENERAL: Well appearing, in no acute distress, alert and oriented x3.  PSYCH:  Mood and affect appropriate.  SKIN: Skin color, texture, turgor normal, no rashes or lesions which will impact the procedure.  CV: RRR with palpation of the radial artery.  PULM: No evidence of respiratory difficulty, symmetric chest rise. Clear to auscultation.  NEURO: Cranial nerves grossly intact.    Plan:    Proceed with procedure as planned Procedure(s) (LRB):  Injection, Joint FLUOROSCOPIC JOINT INJECTION (HIP INJECTION) LEFT HIP (Left)    Channing Blanca  07/22/2019

## 2019-07-22 NOTE — DISCHARGE SUMMARY
Discharge Note  Short Stay      SUMMARY     Admit Date: 7/22/2019    Attending Physician: Alfonso Richards      Discharge Physician: Alfonso Richards      Discharge Date: 7/22/2019 4:06 PM    Procedure(s) (LRB):  Injection, Joint FLUOROSCOPIC JOINT INJECTION (HIP INJECTION) LEFT HIP (Left)    Final Diagnosis: Left hip pain [M25.552]    Disposition: Home or self care    Patient Instructions:   Current Discharge Medication List      CONTINUE these medications which have NOT CHANGED    Details   ACCU-CHEK FASTCLIX LANCING DEV Kit USE AS DIRECTED.  Qty: 1 each, Refills: 0    Associated Diagnoses: Controlled diabetes mellitus type 2 with complications      ACCU-CHEK SMARTVIEW TEST STRIP Strp TEST  ONCE  A  DAY  Qty: 100 strip, Refills: 11      ALCOHOL ANTISEPTIC PADS (ALCOHOL PREP PADS TOP)       atorvastatin (LIPITOR) 20 MG tablet Take 1 tablet (20 mg total) by mouth once daily.  Qty: 90 tablet, Refills: 3    Associated Diagnoses: Combined 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      blood-glucose meter kit Use as instructed  Qty: 1 each, Refills: 0    Comments: AccuCheck  Associated Diagnoses: Controlled type 2 diabetes mellitus without complication, without long-term current use of insulin      calcium carbonate (OS-MALCOLM) 600 mg calcium (1,500 mg) Tab Take 1 tablet by mouth 2 (two) times daily.       carvedilol (COREG) 25 MG tablet Take 1 tablet (25 mg total) by mouth 2 (two) times daily.  Qty: 180 tablet, Refills: 3    Associated Diagnoses: Essential hypertension      cloNIDine (CATAPRES) 0.3 MG tablet TAKE 1 TABLET BY MOUTH THREE TIMES DAILY  Qty: 270 tablet, Refills: 1    Associated Diagnoses: Essential hypertension      epoetin german (PROCRIT INJ) Inject 1,000 Units as directed.      fenofibrate 160 MG Tab Take 1 tablet (160 mg total) by mouth once daily.  Qty: 90 tablet, Refills: 0    Associated Diagnoses: Combined 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      folic acid (FOLVITE) 1 MG tablet  TAKE 1 TABLET BY MOUTH ONCE DAILY  Qty: 90 tablet, Refills: 0    Associated Diagnoses: Sarcoidosis; Myopathy      gabapentin (NEURONTIN) 400 MG capsule Take 400 mg by mouth 3 (three) times daily.      HYDROcodone-acetaminophen (NORCO) 5-325 mg per tablet Take 1 tablet by mouth every 6 (six) hours as needed.  Qty: 90 tablet, Refills: 0    Associated Diagnoses: Degenerative disc disease, lumbar      levothyroxine (SYNTHROID) 50 MCG tablet TAKE 1 TABLET BY MOUTH ONCE DAILY BEFORE BREAKFAST  Qty: 90 tablet, Refills: 1    Associated Diagnoses: Hypothyroidism, unspecified type      metFORMIN (GLUCOPHAGE) 1000 MG tablet TAKE 1 TABLET BY MOUTH TWICE DAILY WITH MEALS  Qty: 180 tablet, Refills: 1    Associated Diagnoses: Diabetes mellitus, type 2; Diabetes mellitus with stage 3 chronic kidney disease      methotrexate 2.5 MG Tab TAKE 6 TABLETS BY MOUTH EVERY 7 DAYS  Qty: 72 tablet, Refills: 0    Comments: Please consider 90 day supplies to promote better adherence  Associated Diagnoses: Sarcoidosis; Myopathy      NIFEdipine (PROCARDIA-XL) 30 MG (OSM) 24 hr tablet Take 1 tablet (30 mg total) by mouth once daily.  Qty: 90 tablet, Refills: 1    Associated Diagnoses: Essential hypertension      predniSONE (DELTASONE) 5 MG tablet Take 1 tablet (5 mg total) by mouth once daily.  Qty: 90 tablet, Refills: 1    Associated Diagnoses: Sarcoidosis; Myopathy      tiZANidine 2 mg Cap Take 1-2 capsules (2-4 mg total) by mouth every 8 (eight) hours as needed.  Qty: 180 capsule, Refills: 1    Associated Diagnoses: Sarcoid; Myalgia                 Discharge Diagnosis: Left hip pain [M25.552]  Condition on Discharge: Stable with no complications to procedure   Diet on Discharge: Same as before.  Activity: as per instruction sheet.  Discharge to: Home with a responsible adult.  Follow up: 2-4 weeks       Please call my office or pager at [phone] if experienced any weakness or loss of sensation, fever > 101.5, pain uncontrolled with oral  medications, persistent nausea/vomiting/or diarrhea, redness or drainage from the incisions, or any other worrisome concerns. If physician on call was not reached or could not communicate with our office for any reason please go to the nearest emergency department

## 2019-07-22 NOTE — DISCHARGE INSTRUCTIONS
Thank you for allowing us to care for you today. You may receive a survey about the care we provided. Your feedback is valuable and helps us provide excellent care throughout the community.     Home Care Instructions for Pain Management:    1. DIET:   You may resume your normal diet today.   2. BATHING:   You may shower with luke warm water. No tub baths or anything that will soak injection sites under water for the next 24 hours.  3. DRESSING:   You may remove your bandage today.   4. ACTIVITY LEVEL:   You may resume your normal activities 24 hrs after your procedure. Nothing strenuous today.  5. MEDICATIONS:   You may resume your normal medications today. To restart blood thinners, ask your doctor.  6. DRIVING    If you have received any sedatives by mouth today, you may not drive for 12 hours.    If you have received any sedation through your IV, you may not drive for 24 hrs.   7. SPECIAL INSTRUCTIONS:   No heat to the injection site for 24 hrs including, hot bath or shower, heating pad, moist heat, or hot tubs.    Use ice pack to injection site for any pain or discomfort.  Apply ice packs for 20 minute intervals as needed.    IF you have diabetes, be sure to monitor your blood sugar more closely. IF your injection contained steroids your blood sugar levels may become higher than normal.    If you are still having pain upon discharge:  Your pain may improve over the next 48 hours. The anesthetic (numbing medication) works immediately to 48 hours. IF your injection contained a steroid (anti-inflammatory medication), it takes approximately 3 days to start feeling relief and 7-10 days to see your greatest results from the medication. It is possible you may need subsequent injections. This would be discussed at your follow up appointment with pain management or your referring doctor.      PLEASE CALL YOUR DOCTOR IF:  1. Redness or swelling around the injection site.  2. Fever of 101 degrees or more  3. Drainage  (pus) from the injection site.  4. For any continuous bleeding (some dried blood over the incision is normal.)    FOR EMERGENCIES:   If any unusual problems or difficulties occur during clinic hours, call [phone] or [phone].

## 2019-07-22 NOTE — PRE ADMISSION SCREENING
Pt BP elevated checked multiple times. Pt stated took her HTN meds this AM. She does have her Clonidine with her. BP readings shown to Fellow. Fellow instructed pt to take her Clonidine and we will re-check her BP.

## 2019-07-22 NOTE — OP NOTE
27- Hip Injection/Arthrogram  ANTERIOR APPROACH  Time-out taken to identify patient and procedure side prior to starting the procedure.   I attest that I have reviewed the patient's home medications prior to the procedure and no contraindication have been identified. I  re-evaluated the patient after the patient was positioned for the procedure in the procedure room immediately before the procedural time-out. The vital signs are current and represent the current state of the patient which has not significantly changed since the preprocedure assessment.                                                                                                                         Date of Service: 07/22/2019    PCP: Micaela Mendoza MD    Referring Physician:                                                                                             PROCEDURE:  Left hip joint injection under fluoroscopy.    REASON FOR PROCEDURE: Left hip pain [M25.552]     1. Primary osteoarthritis of both hips    2. Degenerative joint disease (DJD) of hip      POSTOP DIAGNOSIS: Left hip pain [M25.552]  1. Primary osteoarthritis of both hips    2. Degenerative joint disease (DJD) of hip        PHYSICIAN: Alfonso Richards MD  ASSISTANTS:   Channing Rios MD fellow                                                                                                 ANESTHESIA:  Xylocaine 1% 9ml with Sodium Bicarbonate 1ml. 3ml per site.                                                                                                              MEDICATIONS INJECTED:  Kenalog 20mg, bupivacaine 0.25% 2 mL (per side), and sterile saline 2ml (per side)                                                                                                                                     ESTIMATED BLOOD LOSS:  None.                                                                                                                              COMPLICATIONS:  None.      SEDATION: None                                                                                                                                    TECHNIQUE:  With the patient lying in the supine position the the femoral neck identified under fluoroscopy.  The area was prepped and draped in the usual       sterile fashion.  Local anesthetic was used, given by raising a wheal and    going down to the hub of the 27-gauge needle.  A 3-1/2 inch 22-gauge         needle was introduced under fluoroscopy until the tip reached the lateral aspect of the femoral neck.  When the tip of the needle was thought   to be in appropriate position, contrast was injected to confirm proper placement.  Medication was then injected slowly.  The patient tolerated the procedure well.                                                                                                                     PAIN BEFORE THE PROCEDURE: 10/10.                                                                                                                         PAIN AFTER THE PROCEDURE:  2/10.                                                                                                                          The patient was monitored for 20-30 minutes after the procedure and was      given post procedure and discharge instructions to follow at home.  The      patient is to follow-up with me in two weeks by calling.   The patient was discharged in a stable condtion.

## 2019-07-25 ENCOUNTER — HOSPITAL ENCOUNTER (OUTPATIENT)
Dept: RADIOLOGY | Facility: HOSPITAL | Age: 74
Discharge: HOME | End: 2019-07-25
Attending: FAMILY MEDICINE
Payer: MEDICARE

## 2019-07-25 DIAGNOSIS — Z12.31 ENCOUNTER FOR SCREENING MAMMOGRAM FOR BREAST CANCER: ICD-10-CM

## 2019-07-25 PROCEDURE — 77067 MAMMO DIGITAL SCREENING BILAT WITH TOMOSYNTHESIS_CAD: ICD-10-PCS | Mod: 26,HCNC,, | Performed by: RADIOLOGY

## 2019-07-25 PROCEDURE — 77063 MAMMO DIGITAL SCREENING BILAT WITH TOMOSYNTHESIS_CAD: ICD-10-PCS | Mod: 26,HCNC,, | Performed by: RADIOLOGY

## 2019-07-25 PROCEDURE — 77067 SCR MAMMO BI INCL CAD: CPT | Mod: TC,HCNC

## 2019-07-25 PROCEDURE — 77067 SCR MAMMO BI INCL CAD: CPT | Mod: 26,HCNC,, | Performed by: RADIOLOGY

## 2019-07-25 PROCEDURE — 77063 BREAST TOMOSYNTHESIS BI: CPT | Mod: 26,HCNC,, | Performed by: RADIOLOGY

## 2019-07-26 ENCOUNTER — LAB VISIT (OUTPATIENT)
Dept: LAB | Facility: HOSPITAL | Age: 74
End: 2019-07-26
Attending: INTERNAL MEDICINE
Payer: MEDICARE

## 2019-07-26 ENCOUNTER — PATIENT MESSAGE (OUTPATIENT)
Dept: RHEUMATOLOGY | Facility: CLINIC | Age: 74
End: 2019-07-26

## 2019-07-26 DIAGNOSIS — G72.9 MYOPATHY: Primary | ICD-10-CM

## 2019-07-26 DIAGNOSIS — M79.10 MYALGIA: ICD-10-CM

## 2019-07-26 DIAGNOSIS — D86.9 SARCOID: ICD-10-CM

## 2019-07-26 DIAGNOSIS — N18.9 ANEMIA IN CHRONIC KIDNEY DISEASE, UNSPECIFIED CKD STAGE: ICD-10-CM

## 2019-07-26 DIAGNOSIS — D63.1 ANEMIA IN CHRONIC KIDNEY DISEASE, UNSPECIFIED CKD STAGE: ICD-10-CM

## 2019-07-26 LAB
BASOPHILS # BLD AUTO: 0.01 K/UL (ref 0–0.2)
BASOPHILS NFR BLD: 0.2 % (ref 0–1.9)
DIFFERENTIAL METHOD: ABNORMAL
EOSINOPHIL # BLD AUTO: 0.1 K/UL (ref 0–0.5)
EOSINOPHIL NFR BLD: 1.2 % (ref 0–8)
ERYTHROCYTE [DISTWIDTH] IN BLOOD BY AUTOMATED COUNT: 14.3 % (ref 11.5–14.5)
HCT VFR BLD AUTO: 34.8 % (ref 37–48.5)
HGB BLD-MCNC: 11.3 G/DL (ref 12–16)
LYMPHOCYTES # BLD AUTO: 1.4 K/UL (ref 1–4.8)
LYMPHOCYTES NFR BLD: 29.6 % (ref 18–48)
MCH RBC QN AUTO: 34.3 PG (ref 27–31)
MCHC RBC AUTO-ENTMCNC: 32.5 G/DL (ref 32–36)
MCV RBC AUTO: 106 FL (ref 82–98)
MONOCYTES # BLD AUTO: 0.3 K/UL (ref 0.3–1)
MONOCYTES NFR BLD: 6 % (ref 4–15)
NEUTROPHILS # BLD AUTO: 3.1 K/UL (ref 1.8–7.7)
NEUTROPHILS NFR BLD: 63.4 % (ref 38–73)
PLATELET # BLD AUTO: 285 K/UL (ref 150–350)
PMV BLD AUTO: 8.8 FL (ref 9.2–12.9)
RBC # BLD AUTO: 3.29 M/UL (ref 4–5.4)
WBC # BLD AUTO: 4.86 K/UL (ref 3.9–12.7)

## 2019-07-26 PROCEDURE — 85025 COMPLETE CBC W/AUTO DIFF WBC: CPT | Mod: HCNC

## 2019-07-26 PROCEDURE — 36415 COLL VENOUS BLD VENIPUNCTURE: CPT | Mod: HCNC

## 2019-07-29 ENCOUNTER — PROCEDURE VISIT (OUTPATIENT)
Dept: OPHTHALMOLOGY | Facility: CLINIC | Age: 74
End: 2019-07-29
Attending: OPHTHALMOLOGY
Payer: MEDICARE

## 2019-07-29 VITALS — HEART RATE: 77 BPM | DIASTOLIC BLOOD PRESSURE: 75 MMHG | SYSTOLIC BLOOD PRESSURE: 141 MMHG

## 2019-07-29 DIAGNOSIS — H34.8120 HEMISPHERIC RETINAL VEIN OCCLUSION WITH MACULAR EDEMA OF LEFT EYE: ICD-10-CM

## 2019-07-29 PROCEDURE — 67028 INJECTION EYE DRUG: CPT | Mod: HCNC,LT,S$GLB, | Performed by: OPHTHALMOLOGY

## 2019-07-29 PROCEDURE — 92134 CPTRZ OPH DX IMG PST SGM RTA: CPT | Mod: HCNC,S$GLB,, | Performed by: OPHTHALMOLOGY

## 2019-07-29 PROCEDURE — 99499 NO LOS: ICD-10-PCS | Mod: HCNC,S$GLB,, | Performed by: OPHTHALMOLOGY

## 2019-07-29 PROCEDURE — 99499 UNLISTED E&M SERVICE: CPT | Mod: HCNC,S$GLB,, | Performed by: OPHTHALMOLOGY

## 2019-07-29 PROCEDURE — 92134 POSTERIOR SEGMENT OCT RETINA (OCULAR COHERENCE TOMOGRAPHY)-BOTH EYES: ICD-10-PCS | Mod: HCNC,S$GLB,, | Performed by: OPHTHALMOLOGY

## 2019-07-29 PROCEDURE — 67028 PR INJECT INTRAVITREAL PHARMCOLOGIC: ICD-10-PCS | Mod: HCNC,LT,S$GLB, | Performed by: OPHTHALMOLOGY

## 2019-07-29 RX ADMIN — Medication 1.25 MG: at 11:07

## 2019-07-29 NOTE — PROGRESS NOTES
Subjective:       Patient ID: Regino Lawrence is a 73 y.o. female      Chief Complaint   Patient presents with    Concerns About Ocular Health     Hemispheric Retinal vein Occlusion     History of Present Illness  HPI     Concerns About Ocular Health      Additional comments: Hemispheric Retinal vein Occlusion              Comments     DLS:06/24/2019 Olivia    Patient here for 1 month..Injection OS Avastin-OCT  Pt feels that blur OS went away after injection last month.  No f/f/pain   Constant OU tearing.  DM since 2003  Hemispheric retinal vein occlusion with macular edema            Last edited by Hemanth Baker MD on 7/29/2019 11:01 AM. (History)        Imaging:    See report    Assessment/Plan:     1. Hemispheric retinal vein occlusion with macular edema of left eye  Much improved 5 wks after Avastin  Recommend Av OS today and TREX to 6 wks  Pt agrees    - Posterior Segment OCT Retina-Both eyes  - Prior Authorization Order  - Posterior Segment OCT Retina-Both eyes; Future    Follow up in about 6 weeks (around 9/9/2019), or if symptoms worsen or fail to improve, for OCT and INJECTION ONLY, Avastin, Injection Left eye.     Patient identified.  Timeout performed.    Risks, benefits, and alternatives to treatment were discussed in detail with the patient, including bleeding/infection (endophthalmitis)/etc.  The patient voiced understanding and wished to proceed with the procedure.  See separate consent form.    Injection Procedure Note:  Diagnosis: RVO with ME Left Eye    Topical Proparacaine drop placed then topical 5% Betadine  Sterile gloves used, and sterile lid speculum placed.  5% Betadine placed at injection site again prior to injection.  Avastin 1.25mg in 0.05cc Injected inferotemporally 3.5-4mm posterior to the limbus.  Complications: None  Va at least CF at 5 feet post injection.  Retina, ONH, IOP normal after injection.    Followup as above.  Patient should return immediately PRN.  Retinal  Detachment and Endophthalmitis precautions given.

## 2019-07-29 NOTE — PATIENT INSTRUCTIONS

## 2019-07-30 DIAGNOSIS — D86.9 SARCOIDOSIS: Chronic | ICD-10-CM

## 2019-07-30 DIAGNOSIS — G72.9 MYOPATHY: ICD-10-CM

## 2019-07-30 RX ORDER — TIZANIDINE 2 MG/1
4 TABLET ORAL EVERY 8 HOURS PRN
Qty: 270 TABLET | Refills: 0 | Status: SHIPPED | OUTPATIENT
Start: 2019-07-30 | End: 2019-12-02 | Stop reason: SDUPTHER

## 2019-07-30 RX ORDER — METHOTREXATE 2.5 MG/1
TABLET ORAL
Qty: 72 TABLET | Refills: 0 | Status: SHIPPED | OUTPATIENT
Start: 2019-07-30 | End: 2019-10-14 | Stop reason: SDUPTHER

## 2019-07-30 NOTE — TELEPHONE ENCOUNTER
New prescription for tizanidine written per my previous prescription obtain from patient's pharmacy over the phone.

## 2019-08-02 PROBLEM — Z74.09 IMPAIRED FUNCTIONAL MOBILITY, BALANCE, GAIT, AND ENDURANCE: Status: RESOLVED | Noted: 2019-03-11 | Resolved: 2019-08-02

## 2019-08-02 PROBLEM — R29.3 POOR POSTURE: Status: RESOLVED | Noted: 2019-03-11 | Resolved: 2019-08-02

## 2019-08-02 PROBLEM — M62.81 MUSCLE WEAKNESS OF LOWER EXTREMITY: Status: RESOLVED | Noted: 2019-03-11 | Resolved: 2019-08-02

## 2019-08-09 ENCOUNTER — LAB VISIT (OUTPATIENT)
Dept: LAB | Facility: HOSPITAL | Age: 74
End: 2019-08-09
Attending: INTERNAL MEDICINE
Payer: MEDICARE

## 2019-08-09 DIAGNOSIS — D63.1 ANEMIA IN CHRONIC KIDNEY DISEASE, UNSPECIFIED CKD STAGE: ICD-10-CM

## 2019-08-09 DIAGNOSIS — N18.9 ANEMIA IN CHRONIC KIDNEY DISEASE, UNSPECIFIED CKD STAGE: ICD-10-CM

## 2019-08-09 LAB
BASOPHILS # BLD AUTO: 0.01 K/UL (ref 0–0.2)
BASOPHILS NFR BLD: 0.2 % (ref 0–1.9)
DIFFERENTIAL METHOD: ABNORMAL
EOSINOPHIL # BLD AUTO: 0.1 K/UL (ref 0–0.5)
EOSINOPHIL NFR BLD: 1.7 % (ref 0–8)
ERYTHROCYTE [DISTWIDTH] IN BLOOD BY AUTOMATED COUNT: 13.9 % (ref 11.5–14.5)
HCT VFR BLD AUTO: 31.6 % (ref 37–48.5)
HGB BLD-MCNC: 10.4 G/DL (ref 12–16)
LYMPHOCYTES # BLD AUTO: 1.5 K/UL (ref 1–4.8)
LYMPHOCYTES NFR BLD: 31.3 % (ref 18–48)
MCH RBC QN AUTO: 34.4 PG (ref 27–31)
MCHC RBC AUTO-ENTMCNC: 32.9 G/DL (ref 32–36)
MCV RBC AUTO: 105 FL (ref 82–98)
MONOCYTES # BLD AUTO: 0.2 K/UL (ref 0.3–1)
MONOCYTES NFR BLD: 4.6 % (ref 4–15)
NEUTROPHILS # BLD AUTO: 3 K/UL (ref 1.8–7.7)
NEUTROPHILS NFR BLD: 62.6 % (ref 38–73)
PLATELET # BLD AUTO: 257 K/UL (ref 150–350)
PMV BLD AUTO: 8.5 FL (ref 9.2–12.9)
RBC # BLD AUTO: 3.02 M/UL (ref 4–5.4)
WBC # BLD AUTO: 4.79 K/UL (ref 3.9–12.7)

## 2019-08-09 PROCEDURE — 36415 COLL VENOUS BLD VENIPUNCTURE: CPT | Mod: HCNC

## 2019-08-09 PROCEDURE — 85025 COMPLETE CBC W/AUTO DIFF WBC: CPT | Mod: HCNC

## 2019-08-12 ENCOUNTER — TELEPHONE (OUTPATIENT)
Dept: PAIN MEDICINE | Facility: CLINIC | Age: 74
End: 2019-08-12

## 2019-08-13 ENCOUNTER — OFFICE VISIT (OUTPATIENT)
Dept: PAIN MEDICINE | Facility: CLINIC | Age: 74
End: 2019-08-13
Attending: PHYSICAL MEDICINE & REHABILITATION
Payer: MEDICARE

## 2019-08-13 VITALS
WEIGHT: 125.44 LBS | HEART RATE: 87 BPM | BODY MASS INDEX: 24.63 KG/M2 | TEMPERATURE: 98 F | DIASTOLIC BLOOD PRESSURE: 78 MMHG | SYSTOLIC BLOOD PRESSURE: 134 MMHG | HEIGHT: 60 IN

## 2019-08-13 DIAGNOSIS — G89.4 CHRONIC PAIN SYNDROME: ICD-10-CM

## 2019-08-13 DIAGNOSIS — M51.36 DDD (DEGENERATIVE DISC DISEASE), LUMBAR: ICD-10-CM

## 2019-08-13 DIAGNOSIS — M54.16 LUMBAR RADICULOPATHY: ICD-10-CM

## 2019-08-13 DIAGNOSIS — M16.12 OSTEOARTHRITIS OF LEFT HIP, UNSPECIFIED OSTEOARTHRITIS TYPE: Primary | ICD-10-CM

## 2019-08-13 PROCEDURE — 1101F PR PT FALLS ASSESS DOC 0-1 FALLS W/OUT INJ PAST YR: ICD-10-PCS | Mod: HCNC,CPTII,S$GLB, | Performed by: ANESTHESIOLOGY

## 2019-08-13 PROCEDURE — 1101F PT FALLS ASSESS-DOCD LE1/YR: CPT | Mod: HCNC,CPTII,S$GLB, | Performed by: ANESTHESIOLOGY

## 2019-08-13 PROCEDURE — 99214 OFFICE O/P EST MOD 30 MIN: CPT | Mod: HCNC,S$GLB,, | Performed by: ANESTHESIOLOGY

## 2019-08-13 PROCEDURE — 99999 PR PBB SHADOW E&M-EST. PATIENT-LVL III: CPT | Mod: PBBFAC,HCNC,, | Performed by: ANESTHESIOLOGY

## 2019-08-13 PROCEDURE — 3075F PR MOST RECENT SYSTOLIC BLOOD PRESS GE 130-139MM HG: ICD-10-PCS | Mod: HCNC,CPTII,S$GLB, | Performed by: ANESTHESIOLOGY

## 2019-08-13 PROCEDURE — 3078F DIAST BP <80 MM HG: CPT | Mod: HCNC,CPTII,S$GLB, | Performed by: ANESTHESIOLOGY

## 2019-08-13 PROCEDURE — 99214 PR OFFICE/OUTPT VISIT, EST, LEVL IV, 30-39 MIN: ICD-10-PCS | Mod: HCNC,S$GLB,, | Performed by: ANESTHESIOLOGY

## 2019-08-13 PROCEDURE — 3078F PR MOST RECENT DIASTOLIC BLOOD PRESSURE < 80 MM HG: ICD-10-PCS | Mod: HCNC,CPTII,S$GLB, | Performed by: ANESTHESIOLOGY

## 2019-08-13 PROCEDURE — 99999 PR PBB SHADOW E&M-EST. PATIENT-LVL III: ICD-10-PCS | Mod: PBBFAC,HCNC,, | Performed by: ANESTHESIOLOGY

## 2019-08-13 PROCEDURE — 3075F SYST BP GE 130 - 139MM HG: CPT | Mod: HCNC,CPTII,S$GLB, | Performed by: ANESTHESIOLOGY

## 2019-08-13 NOTE — LETTER
August 13, 2019      Elaine Biggs MD  [address]  [address]  [facility]  [facility] [address]           [facility] [place] [address]  [address]  [facility] [address]  Phone: [phone]  Fax: [phone]          Patient: Regino Lawrence   MR Number: [id number]   YOB: 1945   Date of Visit: 8/13/2019       Dear Dr. Elaine Biggs:    Thank you for referring Regino Lawrence to me for evaluation. Attached you will find relevant portions of my assessment and plan of care.    If you have questions, please do not hesitate to call me. I look forward to following Regino Lawrence along with you.    Sincerely,    Alfonso Richards MD    Enclosure  CC:  No Recipients    If you would like to receive this communication electronically, please contact [email or web address] or [phone] to request more information on [facility] access.    For providers and/or their staff who would like to refer a patient to [facility], please contact us through our one-stop-shop provider referral line, [facility], at [phone].    If you feel you have received this communication in error or would no longer like to receive these types of communications, please e-mail [email or web address]

## 2019-08-13 NOTE — H&P (VIEW-ONLY)
Subjective:      Patient ID: Regino Lawrence is a 73 y.o. female.    Chief Complaint: No chief complaint on file.    Referred by: Elaine Biggs, *     HPI    Ms. Lawrence is a 74 yo F with PMHx of HTN, HLD, DM II, CKD, and chronic pain who presents with complaint of left hip pain. She states her pain began approximately 2-3 years ago and has progressively worsened. She describes her pain as a constant, aching left hip pain with radiation down her lateral left leg to her left foot. She states her pain is exacerbated by prolonged standing or walking and is alleviated by sitting. She states she has taken gabapentin 400 mg TID to help with previous radicular pain as well as Norco 5-325 mg q8 with relief of her hip pain. She has had a left hip injection in March 2019 which she states provided 100% relief x1 month. She was referred by Dr. Biggs for repeat left hip injection.   Regarding prior procedures, pt has had posterior C3-C7 laminectomy and fusion on 11/16/2015.        Interventional Pain History  10/8/18 - left L5 transforaminal epidural steroid injection - good relief of back pain no relief of left lower extremity symptoms  12/6/18 - bilateral L5 transforaminal epidural steroid injection - good relief of back pain no relief of left lower extremity symptoms  3/21/2019 left hip injection      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, without long-term current use of insulin     Depression     Diabetes mellitus, type 2     Eye injury as a child     k-abrasion  od    Hyperlipidemia     Hypertension     Hypothyroidism     Immune deficiency disorder     Immune disorder     Myalgia and myositis  2012    Osteoporosis     Polyneuropathy     Renal manifestation of secondary diabetes mellitus     Sarcoidosis     Ulcer     no cancer    Urinary incontinence        Past Surgical History:   Procedure Laterality Date    CARPAL TUNNEL RELEASE      Rt wrist    CATARACT EXTRACTION W/  INTRAOCULAR LENS IMPLANT Right 2015    Dr. Azevedo    CATARACT EXTRACTION W/  INTRAOCULAR LENS IMPLANT Left 2015    Dr. Azevedo     SECTION      CHOLECYSTECTOMY      COLPOCLEISIS-- [place] N/A 2016    [facility] OR    CYSTOSCOPY N/A 2016    [facility] at [facility] OR    Injection, Joint  fLUOROSCOPIC jOINT iNJECTION (hIP iNJECTION) LEFT ROCH BURSA AS WELL LEFT TROCHANTERIC BURSA Left 3/21/2019    [facility] Richards [facility] at [facility]    Injection, Joint FLUOROSCOPIC JOINT INJECTION (HIP INJECTION) LEFT HIP Left 2019    [facility] at [facility]    Injection,steroid,epidural,transforaminal approach    Bilateral L5 Bilateral 2018    [facility] at [facility]    Injection,steroid,epidural,transforaminal approach  Left L5-S1 Left 10/8/2018    [facility] at [facility]    INSERTION-INTRAOCULAR LENS (IOL) Left 2015    [facility] at [facility] OR    INSERTION-INTRAOCULAR LENS (IOL) Right 2015    [facility] at [facility] OR    LAMINECTOMY-CERVICAL/FUSION-POSTERIOR C3-C7 N/A 2015    [facility] at [facility] OR 2ND FLR    PHACOEMULSIFICATION-ASPIRATION-CATARACT Left 2015    [facility] at [facility] OR    PHACOEMULSIFICATION-ASPIRATION-CATARACT Right 2015    [facility] at [facility]    REPAIR-POSTERIOR N/A 2016    [facility] at [facility] OR    SLING-TRANSOBTERATOR TAPE N/A 2016     [facility] OR    TUBAL LIGATION         Review of patient's allergies indicates:   Allergen Reactions    Azathioprine Shortness Of Breath and Other (See Comments)     Fatigue       Current Outpatient Medications   Medication Sig Dispense Refill    ACCU-CHEK FASTCLIX LANCING DEV Kit USE AS DIRECTED. 1 each 0    ACCU-CHEK SMARTVIEW TEST STRIP Strp TEST  ONCE  A   strip 11    ALCOHOL ANTISEPTIC PADS (ALCOHOL PREP PADS TOP)       atorvastatin (LIPITOR) 20 MG tablet Take 1 tablet (20 mg total) by mouth once daily. 90 tablet 3    blood-glucose meter kit Use as instructed 1 each 0    carvedilol (COREG) 25 MG tablet Take 1 tablet (25 mg total) by mouth 2 (two) times daily. 180 tablet 3    cloNIDine (CATAPRES) 0.3 MG tablet TAKE 1 TABLET BY MOUTH THREE TIMES DAILY 270 tablet 1    epoetin german (PROCRIT INJ) Inject 1,000 Units as directed.      fenofibrate 160 MG Tab Take 1 tablet (160 mg total) by mouth once daily. 90 tablet 0    folic acid (FOLVITE) 1 MG tablet TAKE 1 TABLET BY MOUTH ONCE DAILY 90 tablet 0    gabapentin (NEURONTIN) 400 MG capsule Take 400 mg by mouth 3 (three) times daily.      levothyroxine (SYNTHROID) 50 MCG tablet TAKE 1 TABLET BY MOUTH ONCE DAILY BEFORE BREAKFAST 90 tablet 1    metFORMIN (GLUCOPHAGE) 1000 MG tablet TAKE 1 TABLET BY MOUTH TWICE DAILY WITH MEALS 180 tablet 1    methotrexate 2.5 MG Tab TAKE 6 TABLETS BY MOUTH EVERY 7 DAYS 72 tablet 0    NIFEdipine (PROCARDIA-XL) 30 MG (OSM) 24 hr tablet Take 1 tablet (30 mg total) by mouth once daily. 90 tablet 1    predniSONE (DELTASONE) 5 MG tablet Take 1 tablet (5 mg total) by mouth once daily. 90 tablet 1    tiZANidine (ZANAFLEX) 2 MG tablet Take 2 tablets (4 mg total) by mouth every 8 (eight) hours as needed. 270 tablet 0    calcium carbonate (OS-MALCOLM) 600 mg calcium (1,500 mg) Tab Take 1 tablet by mouth 2 (two) times daily.        No current facility-administered medications for this visit.         Family History   Problem Relation Age of Onset    Hypertension Mother     Cataracts Mother     No Known Problems Father     Hypertension Maternal Grandmother     Glaucoma Sister     Arthritis Sister     No Known Problems Brother     No Known Problems Maternal Aunt     No Known Problems Maternal Uncle     No Known Problems Paternal Aunt     No Known Problems Paternal Uncle     No Known Problems Maternal Grandfather     No Known Problems Paternal Grandmother     No Known Problems Paternal Grandfather     Kidney failure Sister     Hepatitis Sister     Cancer Sister         bone cancer     Immunodeficiency Sister     Lupus Neg Hx     Rheum arthritis Neg Hx     Amblyopia Neg Hx     Blindness Neg Hx     Diabetes Neg Hx     Macular degeneration Neg Hx     Retinal detachment Neg Hx     Strabismus Neg Hx     Stroke Neg Hx     Thyroid disease Neg Hx     Endometrial cancer Neg Hx     Vaginal cancer Neg Hx     Cervical cancer Neg Hx        Social History     Socioeconomic History    Marital status:      Spouse name: Not on file    Number of children: Not on file    Years of education: Not on file    Highest education level: Not on file   Occupational History    Not on file   Social Needs    Financial resource strain: Not on file    Food insecurity:     Worry: Not on file     Inability: Not on file    Transportation needs:     Medical: Not on file     Non-medical: Not on file   Tobacco Use    Smoking status: Never Smoker    Smokeless tobacco: Never Used   Substance and Sexual Activity    Alcohol use: No    Drug use: No    Sexual activity: Yes     Partners: Male   Lifestyle    Physical activity:     Days per week: Not on file     Minutes per session: Not on file    Stress: Not on file   Relationships    Social connections:     Talks on phone: Not on file     Gets together: Not on file     Attends Confucianist service: Not on file     Active member of club or organization:  Not on file     Attends meetings of clubs or organizations: Not on file     Relationship status: Not on file   Other Topics Concern    Not on file   Social History Narrative    Not on file           Review of Systems   Constitution: Negative.   HENT: Negative.  Negative for congestion.    Eyes: Negative.    Cardiovascular: Negative.  Negative for chest pain and palpitations.   Respiratory: Negative for cough and shortness of breath.    Skin: Negative.    Musculoskeletal: Positive for arthritis, back pain, joint pain and stiffness.   Gastrointestinal: Negative.  Negative for abdominal pain.   Genitourinary: Negative.    Neurological: Positive for paresthesias. Negative for disturbances in coordination, focal weakness, light-headedness and weakness.   Psychiatric/Behavioral: Negative.            Objective:   /78   Pulse 87   Temp 98.2 °F (36.8 °C)   Ht 5' (1.524 m)   Wt 56.9 kg (125 lb 7.1 oz)   LMP  (LMP Unknown)   BMI 24.50 kg/m²   Pain Disability Index Review:  Last 3 PDI Scores 8/13/2019   Pain Disability Index (PDI) 64     Normocephalic.  Atraumatic.  Affect appropriate.  Breathing unlabored.  Extra ocular muscles intact.           General    Constitutional: She is oriented to person, place, and time. She appears well-developed and well-nourished. No distress.   HENT:   Head: Normocephalic and atraumatic.   Eyes: Conjunctivae and EOM are normal. Pupils are equal, round, and reactive to light.   Neck: Normal range of motion.   Cardiovascular: Normal rate and regular rhythm.    Pulmonary/Chest: Effort normal and breath sounds normal.   Abdominal: Soft. Bowel sounds are normal.   Neurological: She is alert and oriented to person, place, and time. She has normal reflexes. No cranial nerve deficit.   Psychiatric: She has a normal mood and affect. Her behavior is normal. Thought content normal.         Back (L-Spine & T-Spine) / Neck (C-Spine) Exam     Comments:  BACK: Straight leg raising in the supine  position is negative to radicular pain. Positive pain to palpation over the facet joints of the lumbar spine. Positive facet loading bilaterally. Decreased range of motion with pain reproduction with spine extension.  EXTREMITIES: Peripheral joint ROM is full and pain free without obvious instability or laxity in all four extremities. No deformities, edema, or skin discoloration. Good capillary refill.  MUSCULOSKELETAL: Pain to palpation over GT bursa on left.  Positive pain with palpation over the sacroiliac joints bilaterally.  FABERs test is positive.  FADIRs test is negative.   Bilateral upper and lower extremity strength is normal and symmetric.  No atrophy or tone abnormalities are noted.  NEURO: Bilateral upper and lower extremity coordination and muscle stretch reflexes are physiologic and symmetric.  Plantar response are downgoing. No clonus.  No loss of sensation is noted.  GAIT: Antalgic, walks with cane           Assessment:       Encounter Diagnoses   Name Primary?    Osteoarthritis of left hip, unspecified osteoarthritis type Yes    Chronic pain syndrome     Lumbar radiculopathy     DDD (degenerative disc disease), lumbar          Plan:   We discussed with the patient the assessment and recommendations. The following is the plan we agreed on:    - Pt with previous left hip injection with 100% relief x1 month. Will schedule for repeat left hip joint injection and left GT bursa injection to assist with joint and bursa pain relief.    - Counseled on importance of home exercise and consistent sleeping habits.     - Continue gabapentin 400 mg TID to assist with radicular pain relief.    - Cont Zanaflex 2 mg qhs for myofascial pain relief.    - Cont Norco 5-325 mg q8 prn for breakthrough pain relief until procedure.     - RTC 2 weeks following joint injection.         Diagnoses and all orders for this visit:    Osteoarthritis of left hip, unspecified osteoarthritis type    Chronic pain syndrome    Lumbar  radiculopathy    DDD (degenerative disc disease), lumbar       Adriel Deleon MD  [facility], PGY V    I have personally taken the history and examined this patient and agree with the fellow's note as stated above.

## 2019-08-13 NOTE — PROGRESS NOTES
Subjective:      Patient ID: Regino Lawrence is a 73 y.o. female.    Chief Complaint: No chief complaint on file.    Referred by: Elaine Biggs, *     HPI    Ms. Lawrence is a 74 yo F with PMHx of HTN, HLD, DM II, CKD, and chronic pain who presents with complaint of left hip pain. She states her pain began approximately 2-3 years ago and has progressively worsened. She describes her pain as a constant, aching left hip pain with radiation down her lateral left leg to her left foot. She states her pain is exacerbated by prolonged standing or walking and is alleviated by sitting. She states she has taken gabapentin 400 mg TID to help with previous radicular pain as well as Norco 5-325 mg q8 with relief of her hip pain. She has had a left hip injection in March 2019 which she states provided 100% relief x1 month. She was referred by Dr. Biggs for repeat left hip injection.   Regarding prior procedures, pt has had posterior C3-C7 laminectomy and fusion on 11/16/2015.        Interventional Pain History  10/8/18 - left L5 transforaminal epidural steroid injection - good relief of back pain no relief of left lower extremity symptoms  12/6/18 - bilateral L5 transforaminal epidural steroid injection - good relief of back pain no relief of left lower extremity symptoms  3/21/2019 left hip injection      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, without long-term current use of insulin     Depression     Diabetes mellitus, type 2     Eye injury as a child     k-abrasion  od    Hyperlipidemia     Hypertension     Hypothyroidism     Immune deficiency disorder     Immune disorder     Myalgia and myositis  2012    Osteoporosis     Polyneuropathy     Renal manifestation of secondary diabetes mellitus     Sarcoidosis     Ulcer     no cancer    Urinary incontinence        Past Surgical History:   Procedure Laterality Date    CARPAL TUNNEL RELEASE      Rt wrist    CATARACT EXTRACTION W/  INTRAOCULAR LENS IMPLANT Right 2015    Dr. Azevedo    CATARACT EXTRACTION W/  INTRAOCULAR LENS IMPLANT Left 2015    Dr. Azevedo     SECTION      CHOLECYSTECTOMY      COLPOCLEISIS-- [place] N/A 2016    Performed by Merediht Becker DO at [facility], [place], operated by [facility] OR    CYSTOSCOPY N/A 2016    Performed by Meredith Becker DO at [facility], [place], [facility] OR    Injection, Joint  fLUOROSCOPIC jOINT iNJECTION (hIP iNJECTION) LEFT ROCH BURSA AS WELL LEFT TROCHANTERIC BURSA Left 3/21/2019    Performed by Alfonso Richards MD at [facility], [place], operated by [facility] PAIN MGT    Injection, Joint FLUOROSCOPIC JOINT INJECTION (HIP INJECTION) LEFT HIP Left 2019    Performed by Alfonso Richards MD at [facility], [place], operated by [facility] PAIN MGT    Injection,steroid,epidural,transforaminal approach    Bilateral L5 Bilateral 2018    Performed by Alfonso Richards MD at [facility], [place], operated by [facility] PAIN MGT    Injection,steroid,epidural,transforaminal approach  Left L5-S1 Left 10/8/2018    Performed by Alfonso Richards MD at [facility], [place], operated [facility] PAIN MGT    INSERTION-INTRAOCULAR LENS (IOL) Left 2015    Performed [facility] Elio [facility]edo [facility] at [facility] OR    INSERTION-INTRAOCULAR LENS (IOL) Right 2015    Performed by Elio Azevedo MD at [facility] OR    LAMINECTOMY-CERVICAL/FUSION-POSTERIOR C3-C7 N/A 2015    Performed by Fab Conner MD at [facility] OR 2ND FLR    PHACOEMULSIFICATION-ASPIRATION-CATARACT Left 2015    Performed by Elio Azevedo MD at [facility] OR    PHACOEMULSIFICATION-ASPIRATION-CATARACT Right 2015    Performed by Elio Azevedo MD at [facility] OR    REPAIR-POSTERIOR N/A 2016    Performed by Meredith Becker DO at [facility] [place] OR    SLING-TRANSOBTERATOR TAPE N/A 2016     Performed by Meredith Becker DO at [facility] OR    TUBAL LIGATION         Review of patient's allergies indicates:   Allergen Reactions    Azathioprine Shortness Of Breath and Other (See Comments)     Fatigue       Current Outpatient Medications   Medication Sig Dispense Refill    ACCU-CHEK FASTCLIX LANCING DEV Kit USE AS DIRECTED. 1 each 0    ACCU-CHEK SMARTVIEW TEST STRIP Strp TEST  ONCE  A   strip 11    ALCOHOL ANTISEPTIC PADS (ALCOHOL PREP PADS TOP)       atorvastatin (LIPITOR) 20 MG tablet Take 1 tablet (20 mg total) by mouth once daily. 90 tablet 3    blood-glucose meter kit Use as instructed 1 each 0    carvedilol (COREG) 25 MG tablet Take 1 tablet (25 mg total) by mouth 2 (two) times daily. 180 tablet 3    cloNIDine (CATAPRES) 0.3 MG tablet TAKE 1 TABLET BY MOUTH THREE TIMES DAILY 270 tablet 1    epoetin german (PROCRIT INJ) Inject 1,000 Units as directed.      fenofibrate 160 MG Tab Take 1 tablet (160 mg total) by mouth once daily. 90 tablet 0    folic acid (FOLVITE) 1 MG tablet TAKE 1 TABLET BY MOUTH ONCE DAILY 90 tablet 0    gabapentin (NEURONTIN) 400 MG capsule Take 400 mg by mouth 3 (three) times daily.      levothyroxine (SYNTHROID) 50 MCG tablet TAKE 1 TABLET BY MOUTH ONCE DAILY BEFORE BREAKFAST 90 tablet 1    metFORMIN (GLUCOPHAGE) 1000 MG tablet TAKE 1 TABLET BY MOUTH TWICE DAILY WITH MEALS 180 tablet 1    methotrexate 2.5 MG Tab TAKE 6 TABLETS BY MOUTH EVERY 7 DAYS 72 tablet 0    NIFEdipine (PROCARDIA-XL) 30 MG (OSM) 24 hr tablet Take 1 tablet (30 mg total) by mouth once daily. 90 tablet 1    predniSONE (DELTASONE) 5 MG tablet Take 1 tablet (5 mg total) by mouth once daily. 90 tablet 1    tiZANidine (ZANAFLEX) 2 MG tablet Take 2 tablets (4 mg total) by mouth every 8 (eight) hours as needed. 270 tablet 0    calcium carbonate (OS-MALCOLM) 600 mg calcium (1,500 mg) Tab Take 1 tablet by mouth 2 (two) times daily.        No current facility-administered medications for this visit.         Family History   Problem Relation Age of Onset    Hypertension Mother     Cataracts Mother     No Known Problems Father     Hypertension Maternal Grandmother     Glaucoma Sister     Arthritis Sister     No Known Problems Brother     No Known Problems Maternal Aunt     No Known Problems Maternal Uncle     No Known Problems Paternal Aunt     No Known Problems Paternal Uncle     No Known Problems Maternal Grandfather     No Known Problems Paternal Grandmother     No Known Problems Paternal Grandfather     Kidney failure Sister     Hepatitis Sister     Cancer Sister         bone cancer     Immunodeficiency Sister     Lupus Neg Hx     Rheum arthritis Neg Hx     Amblyopia Neg Hx     Blindness Neg Hx     Diabetes Neg Hx     Macular degeneration Neg Hx     Retinal detachment Neg Hx     Strabismus Neg Hx     Stroke Neg Hx     Thyroid disease Neg Hx     Endometrial cancer Neg Hx     Vaginal cancer Neg Hx     Cervical cancer Neg Hx        Social History     Socioeconomic History    Marital status:      Spouse name: Not on file    Number of children: Not on file    Years of education: Not on file    Highest education level: Not on file   Occupational History    Not on file   Social Needs    Financial resource strain: Not on file    Food insecurity:     Worry: Not on file     Inability: Not on file    Transportation needs:     Medical: Not on file     Non-medical: Not on file   Tobacco Use    Smoking status: Never Smoker    Smokeless tobacco: Never Used   Substance and Sexual Activity    Alcohol use: No    Drug use: No    Sexual activity: Yes     Partners: Male   Lifestyle    Physical activity:     Days per week: Not on file     Minutes per session: Not on file    Stress: Not on file   Relationships    Social connections:     Talks on phone: Not on file     Gets together: Not on file     Attends Jew service: Not on file     Active member of club or organization:  Not on file     Attends meetings of clubs or organizations: Not on file     Relationship status: Not on file   Other Topics Concern    Not on file   Social History Narrative    Not on file           Review of Systems   Constitution: Negative.   HENT: Negative.  Negative for congestion.    Eyes: Negative.    Cardiovascular: Negative.  Negative for chest pain and palpitations.   Respiratory: Negative for cough and shortness of breath.    Skin: Negative.    Musculoskeletal: Positive for arthritis, back pain, joint pain and stiffness.   Gastrointestinal: Negative.  Negative for abdominal pain.   Genitourinary: Negative.    Neurological: Positive for paresthesias. Negative for disturbances in coordination, focal weakness, light-headedness and weakness.   Psychiatric/Behavioral: Negative.            Objective:   /78   Pulse 87   Temp 98.2 °F (36.8 °C)   Ht 5' (1.524 m)   Wt 56.9 kg (125 lb 7.1 oz)   LMP  (LMP Unknown)   BMI 24.50 kg/m²   Pain Disability Index Review:  Last 3 PDI Scores 8/13/2019   Pain Disability Index (PDI) 64     Normocephalic.  Atraumatic.  Affect appropriate.  Breathing unlabored.  Extra ocular muscles intact.           General    Constitutional: She is oriented to person, place, and time. She appears well-developed and well-nourished. No distress.   HENT:   Head: Normocephalic and atraumatic.   Eyes: Conjunctivae and EOM are normal. Pupils are equal, round, and reactive to light.   Neck: Normal range of motion.   Cardiovascular: Normal rate and regular rhythm.    Pulmonary/Chest: Effort normal and breath sounds normal.   Abdominal: Soft. Bowel sounds are normal.   Neurological: She is alert and oriented to person, place, and time. She has normal reflexes. No cranial nerve deficit.   Psychiatric: She has a normal mood and affect. Her behavior is normal. Thought content normal.         Back (L-Spine & T-Spine) / Neck (C-Spine) Exam     Comments:  BACK: Straight leg raising in the supine  position is negative to radicular pain. Positive pain to palpation over the facet joints of the lumbar spine. Positive facet loading bilaterally. Decreased range of motion with pain reproduction with spine extension.  EXTREMITIES: Peripheral joint ROM is full and pain free without obvious instability or laxity in all four extremities. No deformities, edema, or skin discoloration. Good capillary refill.  MUSCULOSKELETAL: Pain to palpation over GT bursa on left.  Positive pain with palpation over the sacroiliac joints bilaterally.  FABERs test is positive.  FADIRs test is negative.   Bilateral upper and lower extremity strength is normal and symmetric.  No atrophy or tone abnormalities are noted.  NEURO: Bilateral upper and lower extremity coordination and muscle stretch reflexes are physiologic and symmetric.  Plantar response are downgoing. No clonus.  No loss of sensation is noted.  GAIT: Antalgic, walks with cane           Assessment:       Encounter Diagnoses   Name Primary?    Osteoarthritis of left hip, unspecified osteoarthritis type Yes    Chronic pain syndrome     Lumbar radiculopathy     DDD (degenerative disc disease), lumbar          Plan:   We discussed with the patient the assessment and recommendations. The following is the plan we agreed on:    - Pt with previous left hip injection with 100% relief x1 month. Will schedule for repeat left hip joint injection and left GT bursa injection to assist with joint and bursa pain relief.    - Counseled on importance of home exercise and consistent sleeping habits.     - Continue gabapentin 400 mg TID to assist with radicular pain relief.    - Cont Zanaflex 2 mg qhs for myofascial pain relief.    - Cont Norco 5-325 mg q8 prn for breakthrough pain relief until procedure.     - RTC 2 weeks following joint injection.         Diagnoses and all orders for this visit:    Osteoarthritis of left hip, unspecified osteoarthritis type    Chronic pain syndrome    Lumbar  radiculopathy    DDD (degenerative disc disease), lumbar       Adriel Deleon MD  [facility] Pain Fellow, PGY V    I have personally taken the history and examined this patient and agree with the fellow's note as stated above.

## 2019-08-15 ENCOUNTER — PATIENT MESSAGE (OUTPATIENT)
Dept: FAMILY MEDICINE | Facility: CLINIC | Age: 74
End: 2019-08-15

## 2019-08-15 DIAGNOSIS — M51.36 DEGENERATIVE DISC DISEASE, LUMBAR: ICD-10-CM

## 2019-08-15 RX ORDER — HYDROCODONE BITARTRATE AND ACETAMINOPHEN 5; 325 MG/1; MG/1
1 TABLET ORAL EVERY 6 HOURS PRN
Qty: 90 TABLET | Refills: 0 | Status: SHIPPED | OUTPATIENT
Start: 2019-08-15 | End: 2020-01-27 | Stop reason: SDUPTHER

## 2019-08-21 ENCOUNTER — OFFICE VISIT (OUTPATIENT)
Dept: HOME HEALTH SERVICES | Facility: CLINIC | Age: 74
End: 2019-08-21
Payer: MEDICARE

## 2019-08-21 VITALS
BODY MASS INDEX: 24.54 KG/M2 | WEIGHT: 125 LBS | HEIGHT: 60 IN | SYSTOLIC BLOOD PRESSURE: 140 MMHG | DIASTOLIC BLOOD PRESSURE: 88 MMHG

## 2019-08-21 DIAGNOSIS — M54.16 LUMBAR RADICULOPATHY: ICD-10-CM

## 2019-08-21 DIAGNOSIS — M51.36 DEGENERATIVE DISC DISEASE, LUMBAR: ICD-10-CM

## 2019-08-21 DIAGNOSIS — H04.123 DRY EYE SYNDROME, BILATERAL: ICD-10-CM

## 2019-08-21 DIAGNOSIS — M48.062 LUMBAR STENOSIS WITH NEUROGENIC CLAUDICATION: ICD-10-CM

## 2019-08-21 DIAGNOSIS — D84.9 IMMUNOSUPPRESSION: Chronic | ICD-10-CM

## 2019-08-21 DIAGNOSIS — E11.8 CONTROLLED TYPE 2 DIABETES MELLITUS WITH COMPLICATION, WITHOUT LONG-TERM CURRENT USE OF INSULIN: Chronic | ICD-10-CM

## 2019-08-21 DIAGNOSIS — M54.42 CHRONIC BILATERAL LOW BACK PAIN WITH LEFT-SIDED SCIATICA: ICD-10-CM

## 2019-08-21 DIAGNOSIS — E78.2 COMBINED HYPERLIPIDEMIA ASSOCIATED WITH TYPE 2 DIABETES MELLITUS: Chronic | ICD-10-CM

## 2019-08-21 DIAGNOSIS — K21.9 GASTROESOPHAGEAL REFLUX DISEASE, ESOPHAGITIS PRESENCE NOT SPECIFIED: ICD-10-CM

## 2019-08-21 DIAGNOSIS — E11.69 COMBINED HYPERLIPIDEMIA ASSOCIATED WITH TYPE 2 DIABETES MELLITUS: Chronic | ICD-10-CM

## 2019-08-21 DIAGNOSIS — H52.7 REFRACTIVE ERROR: ICD-10-CM

## 2019-08-21 DIAGNOSIS — E11.9 DM TYPE 2 WITHOUT RETINOPATHY: ICD-10-CM

## 2019-08-21 DIAGNOSIS — D86.9 SARCOIDOSIS: Chronic | ICD-10-CM

## 2019-08-21 DIAGNOSIS — I10 ESSENTIAL HYPERTENSION: Chronic | ICD-10-CM

## 2019-08-21 DIAGNOSIS — E03.9 HYPOTHYROIDISM, UNSPECIFIED TYPE: Chronic | ICD-10-CM

## 2019-08-21 DIAGNOSIS — D50.9 IRON DEFICIENCY ANEMIA, UNSPECIFIED IRON DEFICIENCY ANEMIA TYPE: ICD-10-CM

## 2019-08-21 DIAGNOSIS — Z96.1 PSEUDOPHAKIA: ICD-10-CM

## 2019-08-21 DIAGNOSIS — G89.29 CHRONIC BILATERAL LOW BACK PAIN WITH LEFT-SIDED SCIATICA: ICD-10-CM

## 2019-08-21 DIAGNOSIS — E11.3292 CONTROLLED TYPE 2 DIABETES MELLITUS WITH LEFT EYE AFFECTED BY MILD NONPROLIFERATIVE RETINOPATHY WITHOUT MACULAR EDEMA, WITHOUT LONG-TERM CURRENT USE OF INSULIN: Chronic | ICD-10-CM

## 2019-08-21 DIAGNOSIS — Z00.00 ENCOUNTER FOR PREVENTIVE HEALTH EXAMINATION: Primary | ICD-10-CM

## 2019-08-21 DIAGNOSIS — H34.8120 HEMISPHERIC RETINAL VEIN OCCLUSION WITH MACULAR EDEMA OF LEFT EYE: ICD-10-CM

## 2019-08-21 DIAGNOSIS — I70.0 CALCIFICATION OF AORTA: ICD-10-CM

## 2019-08-21 PROCEDURE — G0439 PPPS, SUBSEQ VISIT: HCPCS | Mod: S$GLB,,, | Performed by: NURSE PRACTITIONER

## 2019-08-21 PROCEDURE — G0439 PR MEDICARE ANNUAL WELLNESS SUBSEQUENT VISIT: ICD-10-PCS | Mod: S$GLB,,, | Performed by: NURSE PRACTITIONER

## 2019-08-21 PROCEDURE — 3079F DIAST BP 80-89 MM HG: CPT | Mod: CPTII,S$GLB,, | Performed by: NURSE PRACTITIONER

## 2019-08-21 PROCEDURE — 3044F PR MOST RECENT HEMOGLOBIN A1C LEVEL <7.0%: ICD-10-PCS | Mod: CPTII,S$GLB,, | Performed by: NURSE PRACTITIONER

## 2019-08-21 PROCEDURE — 3077F PR MOST RECENT SYSTOLIC BLOOD PRESSURE >= 140 MM HG: ICD-10-PCS | Mod: CPTII,S$GLB,, | Performed by: NURSE PRACTITIONER

## 2019-08-21 PROCEDURE — 3077F SYST BP >= 140 MM HG: CPT | Mod: CPTII,S$GLB,, | Performed by: NURSE PRACTITIONER

## 2019-08-21 PROCEDURE — 3044F HG A1C LEVEL LT 7.0%: CPT | Mod: CPTII,S$GLB,, | Performed by: NURSE PRACTITIONER

## 2019-08-21 PROCEDURE — 99499 RISK ADDL DX/OHS AUDIT: ICD-10-PCS | Mod: S$GLB,,, | Performed by: NURSE PRACTITIONER

## 2019-08-21 PROCEDURE — 3079F PR MOST RECENT DIASTOLIC BLOOD PRESSURE 80-89 MM HG: ICD-10-PCS | Mod: CPTII,S$GLB,, | Performed by: NURSE PRACTITIONER

## 2019-08-21 PROCEDURE — 99499 UNLISTED E&M SERVICE: CPT | Mod: S$GLB,,, | Performed by: NURSE PRACTITIONER

## 2019-08-21 NOTE — PATIENT INSTRUCTIONS
Counseling and Referral of Other Preventative  (Italic type indicates deductible and co-insurance are waived)    Patient Name: Regino Lawrence  Today's Date: 8/21/2019    Health Maintenance       Date Due Completion Date    Urine Microalbumin 09/12/2019 9/12/2018    Complete Opioid Risk Tool 09/30/2019 (Originally 10/29/1963) ---    Shingles Vaccine (2 of 3) 09/30/2019 (Originally 7/20/2015) 5/25/2015    Influenza Vaccine (1) 09/01/2019 8/30/2018    Override on 8/8/2018: Done    Override on 9/27/2017: Done    Hemoglobin A1c 09/12/2019 3/12/2019    Lipid Panel 03/12/2020 3/12/2019    DEXA SCAN 05/05/2020 5/5/2017    Foot Exam 06/13/2020 6/13/2019 (Done)    Override on 6/13/2019: Done    Override on 6/19/2018: Done    Override on 5/12/2017: Done    Eye Exam 06/24/2020 6/24/2019    Mammogram 07/25/2020 7/25/2019    Colonoscopy 02/09/2025 2/9/2015 (Done)    Override on 2/9/2015: Done    Override on 2/18/2005: Done (reportedly normal)    TETANUS VACCINE 05/16/2026 5/16/2016        No orders of the defined types were placed in this encounter.    The following information is provided to all patients.  This information is to help you find resources for any of the problems found today that may be affecting your health:                Living healthy guide: www.[facility].[place].gov      Understanding Diabetes: www.diabetes.org      Eating healthy: www.cdc.gov/healthyweight      CDC home safety checklist: www.cdc.gov/steadi/patient.html      Agency on Aging: www.goea.[place].[facility]      Alcoholics anonymous (AA): www.aa.org      Physical Activity: www.himanshu.nih.gov/dz1xkeh      Tobacco use: www.quitwithusla.org

## 2019-08-21 NOTE — PROGRESS NOTES
Regino Lawrence presented for a  Medicare AWV and comprehensive Health Risk Assessment today. The following components were reviewed and updated:    · Medical history  · Family History  · Social history  · Allergies and Current Medications  · Health Risk Assessment  · Health Maintenance  · Care Team     ** See Completed Assessments for Annual Wellness Visit within the encounter summary.**       The following assessments were completed:  · Living Situation  · CAGE  · Depression Screening  · Timed Get Up and Go  · Whisper Test  · Cognitive Function Screening  ·   ·   ·   · Nutrition Screening  · ADL Screening  · PAQ Screening    Vitals:    08/21/19 0839   BP: (!) 140/88   Weight: 56.7 kg (125 lb)   Height: 5' (1.524 m)     Body mass index is 24.41 kg/m².  Physical Exam   Constitutional: She is oriented to person, place, and time. She appears well-developed.   HENT:   Head: Normocephalic and atraumatic.   Eyes: Pupils are equal, round, and reactive to light.   Neck: Normal range of motion.   Cardiovascular: Normal rate and regular rhythm.   Pulmonary/Chest: Effort normal and breath sounds normal. No respiratory distress.   Abdominal: Soft. Bowel sounds are normal. She exhibits no distension.   Musculoskeletal: Normal range of motion. She exhibits no edema.   Neurological: She is alert and oriented to person, place, and time.   Skin: Skin is warm and dry.   Psychiatric: She has a normal mood and affect. Her behavior is normal.         Diagnoses and health risks identified today and associated recommendations/orders:    1. Encounter for preventive health examination  Assessment competed. Preventive measures reviewed.    2. Hemispheric retinal vein occlusion with macular edema of left eye  Stable, followed by Optometry.    3. Essential hypertension  Stable, followed by PCP.    4. Calcification of aorta  Stable, followed by PCP.    5. Combined 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  Stable, followed by  PCP.    6. Hypothyroidism, unspecified type  Stable, followed by PCP.    7. Gastroesophageal reflux disease, esophagitis presence not specified  Stable, followed by PCP.    8. Sarcoidosis  Stable, followed by Rheumatology.    9. Immunosuppression  Stable, followed by Rheumatology.    10. Iron deficiency anemia, unspecified iron deficiency anemia type  Stable, followed by Hematology/oNCOLOGY.    11. DM type 2 without retinopathy  Stable, followed by PCP.    12. Degenerative disc disease, lumbar  Stable, followed by Pain Management.    13. Lumbar radiculopathy  Stable, followed by Pain Managment.    14. Lumbar stenosis with neurogenic claudication  Stable, followed by Pain Management.    15. 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, without long-term current use of insulin  Stable, followed by PCP.    16. Pseudophakia  Stable, followed by Optometry.    17. Refractive error  Stable, followed by Optometry.    18. Dry eye syndrome, bilateral  Stable, followed by Optometry.    19. Controlled type 2 diabetes mellitus with complication, without long-term current use of insulin  Stable, followed by PCP.    20. Chronic bilateral low back pain with left-sided sciatica  Stable, followed by Pain Management.    Provided Arletha with a 5-10 year written screening schedule and personal prevention plan. Recommendations were developed using the USPSTF age appropriate recommendations. Education, counseling, and referrals were provided as needed. After Visit Summary printed and given to patient which includes a list of additional screenings\tests needed.    No follow-ups on file.    Ernestina Madrigal NP  I offered to discuss end of life issues, including information on how to make advance directives that the patient could use to name someone who would make medical decisions on their behalf if they became too ill to make themselves.    ___Patient  declined  _X_Patient is interested, I provided paper work and offered to discuss.

## 2019-08-26 ENCOUNTER — PATIENT MESSAGE (OUTPATIENT)
Dept: FAMILY MEDICINE | Facility: CLINIC | Age: 74
End: 2019-08-26

## 2019-08-26 DIAGNOSIS — M54.42 CHRONIC BILATERAL LOW BACK PAIN WITH LEFT-SIDED SCIATICA: Primary | ICD-10-CM

## 2019-08-26 DIAGNOSIS — G89.29 CHRONIC BILATERAL LOW BACK PAIN WITH LEFT-SIDED SCIATICA: Primary | ICD-10-CM

## 2019-08-26 RX ORDER — GABAPENTIN 400 MG/1
400 CAPSULE ORAL 3 TIMES DAILY
Qty: 90 CAPSULE | Refills: 1 | Status: SHIPPED | OUTPATIENT
Start: 2019-08-26 | End: 2020-01-21 | Stop reason: SDUPTHER

## 2019-08-27 DIAGNOSIS — E78.2 COMBINED HYPERLIPIDEMIA ASSOCIATED WITH TYPE 2 DIABETES MELLITUS: Chronic | ICD-10-CM

## 2019-08-27 DIAGNOSIS — E11.69 COMBINED HYPERLIPIDEMIA ASSOCIATED WITH TYPE 2 DIABETES MELLITUS: Chronic | ICD-10-CM

## 2019-08-27 DIAGNOSIS — I10 ESSENTIAL HYPERTENSION: Chronic | ICD-10-CM

## 2019-08-27 RX ORDER — FENOFIBRATE 160 MG/1
160 TABLET ORAL DAILY
Qty: 90 TABLET | Refills: 1 | Status: SHIPPED | OUTPATIENT
Start: 2019-08-27 | End: 2020-03-31

## 2019-08-27 RX ORDER — NIFEDIPINE 30 MG/1
30 TABLET, EXTENDED RELEASE ORAL DAILY
Qty: 90 TABLET | Refills: 1 | Status: SHIPPED | OUTPATIENT
Start: 2019-08-27 | End: 2020-02-28 | Stop reason: SDUPTHER

## 2019-08-29 ENCOUNTER — OFFICE VISIT (OUTPATIENT)
Dept: RHEUMATOLOGY | Facility: CLINIC | Age: 74
End: 2019-08-29
Payer: MEDICARE

## 2019-08-29 VITALS
SYSTOLIC BLOOD PRESSURE: 123 MMHG | BODY MASS INDEX: 25.71 KG/M2 | HEART RATE: 88 BPM | DIASTOLIC BLOOD PRESSURE: 75 MMHG | HEIGHT: 60 IN | WEIGHT: 130.94 LBS

## 2019-08-29 DIAGNOSIS — R53.83 FATIGUE, UNSPECIFIED TYPE: ICD-10-CM

## 2019-08-29 DIAGNOSIS — D86.9 SARCOIDOSIS: Primary | Chronic | ICD-10-CM

## 2019-08-29 DIAGNOSIS — G72.9 MYOPATHY: ICD-10-CM

## 2019-08-29 DIAGNOSIS — D84.9 IMMUNOSUPPRESSION: Chronic | ICD-10-CM

## 2019-08-29 DIAGNOSIS — M85.80 OSTEOPENIA, UNSPECIFIED LOCATION: ICD-10-CM

## 2019-08-29 DIAGNOSIS — E11.3292 CONTROLLED TYPE 2 DIABETES MELLITUS WITH LEFT EYE AFFECTED BY MILD NONPROLIFERATIVE RETINOPATHY WITHOUT MACULAR EDEMA, WITHOUT LONG-TERM CURRENT USE OF INSULIN: Chronic | ICD-10-CM

## 2019-08-29 PROCEDURE — 96372 PR INJECTION,THERAP/PROPH/DIAG2ST, IM OR SUBCUT: ICD-10-PCS | Mod: HCNC,59,S$GLB, | Performed by: INTERNAL MEDICINE

## 2019-08-29 PROCEDURE — 1101F PR PT FALLS ASSESS DOC 0-1 FALLS W/OUT INJ PAST YR: ICD-10-PCS | Mod: HCNC,CPTII,S$GLB, | Performed by: INTERNAL MEDICINE

## 2019-08-29 PROCEDURE — 99214 OFFICE O/P EST MOD 30 MIN: CPT | Mod: HCNC,25,S$GLB, | Performed by: INTERNAL MEDICINE

## 2019-08-29 PROCEDURE — 99999 PR PBB SHADOW E&M-EST. PATIENT-LVL III: ICD-10-PCS | Mod: PBBFAC,HCNC,, | Performed by: INTERNAL MEDICINE

## 2019-08-29 PROCEDURE — 3044F HG A1C LEVEL LT 7.0%: CPT | Mod: HCNC,CPTII,S$GLB, | Performed by: INTERNAL MEDICINE

## 2019-08-29 PROCEDURE — 99999 PR PBB SHADOW E&M-EST. PATIENT-LVL III: CPT | Mod: PBBFAC,HCNC,, | Performed by: INTERNAL MEDICINE

## 2019-08-29 PROCEDURE — 3078F PR MOST RECENT DIASTOLIC BLOOD PRESSURE < 80 MM HG: ICD-10-PCS | Mod: HCNC,CPTII,S$GLB, | Performed by: INTERNAL MEDICINE

## 2019-08-29 PROCEDURE — 3074F PR MOST RECENT SYSTOLIC BLOOD PRESSURE < 130 MM HG: ICD-10-PCS | Mod: HCNC,CPTII,S$GLB, | Performed by: INTERNAL MEDICINE

## 2019-08-29 PROCEDURE — 3074F SYST BP LT 130 MM HG: CPT | Mod: HCNC,CPTII,S$GLB, | Performed by: INTERNAL MEDICINE

## 2019-08-29 PROCEDURE — 96372 THER/PROPH/DIAG INJ SC/IM: CPT | Mod: HCNC,59,S$GLB, | Performed by: INTERNAL MEDICINE

## 2019-08-29 PROCEDURE — 3044F PR MOST RECENT HEMOGLOBIN A1C LEVEL <7.0%: ICD-10-PCS | Mod: HCNC,CPTII,S$GLB, | Performed by: INTERNAL MEDICINE

## 2019-08-29 PROCEDURE — 3078F DIAST BP <80 MM HG: CPT | Mod: HCNC,CPTII,S$GLB, | Performed by: INTERNAL MEDICINE

## 2019-08-29 PROCEDURE — 99214 PR OFFICE/OUTPT VISIT, EST, LEVL IV, 30-39 MIN: ICD-10-PCS | Mod: HCNC,25,S$GLB, | Performed by: INTERNAL MEDICINE

## 2019-08-29 PROCEDURE — 1101F PT FALLS ASSESS-DOCD LE1/YR: CPT | Mod: HCNC,CPTII,S$GLB, | Performed by: INTERNAL MEDICINE

## 2019-08-29 RX ORDER — TRIAMCINOLONE ACETONIDE 40 MG/ML
80 INJECTION, SUSPENSION INTRA-ARTICULAR; INTRAMUSCULAR
Status: COMPLETED | OUTPATIENT
Start: 2019-08-29 | End: 2019-08-29

## 2019-08-29 RX ADMIN — TRIAMCINOLONE ACETONIDE 80 MG: 40 INJECTION, SUSPENSION INTRA-ARTICULAR; INTRAMUSCULAR at 12:08

## 2019-08-29 ASSESSMENT — ROUTINE ASSESSMENT OF PATIENT INDEX DATA (RAPID3)
TOTAL RAPID3 SCORE: 7.55
PAIN SCORE: 10
PSYCHOLOGICAL DISTRESS SCORE: 1.1
FATIGUE SCORE: 10
AM STIFFNESS SCORE: 1, YES
MDHAQ FUNCTION SCORE: .8
WHEN YOU AWAKENED IN THE MORNING OVER THE LAST WEEK, PLEASE INDICATE THE AMOUNT OF TIME IT TAKES UNTIL YOU ARE AS LIMBER AS YOU WILL BE FOR THE DAY: 15 MINUTES
PATIENT GLOBAL ASSESSMENT SCORE: 10

## 2019-08-29 NOTE — PROGRESS NOTES
Subjective:       Patient ID: Regino Lawrence is a 73 y.o. female.    Chief Complaint: Sarcoidosis    HPI:  Regino Lawrence is a 73 y.o. female with history of sarcoidosis with associated myopathy and   arthropathy. Sarcoidosis that was first manifested by muscle inflammation, low white   blood cell count, hair loss, skin involvement. She was treated in the   past with methotrexate and Plaquenil, both of which were ineffective.   Cellcept and Imuran caused some unknown side effect (she thinks it made her sick).   Colchicine was held due to low WBC.   Although methotrexate did not help in past it was retried and she felt it helped hair growth but did not help body aches.   She held MTX due to an URI but patient has not wanted restarted since then (2013).   Leflunomide was held due to elevated BP after less than a week of use.    Interval History:     Bilateral hip bursa pain to be be managed by pain management.   She has scooter and it helps.   Hands continue to swell periodically.    Pain 10/10 ache in hips, left leg, hands and left foot.    Pain medication helps some.  Activity worsens.        Currently on prednisone 5 mg.  Currently on MTX 6 tabs.      Review of Systems   Constitutional: Positive for fatigue.   HENT:        Dry mouth   Eyes:        Dry eyes   Respiratory: Positive for shortness of breath.    Cardiovascular: Negative.    Gastrointestinal: Negative.    Endocrine: Negative.    Genitourinary: Negative.    Musculoskeletal: Positive for arthralgias and back pain.   Skin: Negative.    Allergic/Immunologic: Negative.    Neurological: Negative.    Hematological: Negative.    Psychiatric/Behavioral: Negative.          Objective:   /75 (BP Location: Left arm, Patient Position: Sitting, BP Method: Medium (Automatic))   Pulse 88   Ht 5' (1.524 m)   Wt 59.4 kg (130 lb 15.3 oz)   LMP  (LMP Unknown)   BMI 25.58 kg/m²      Physical Exam   Constitutional: She is oriented to person, place, and time and  well-developed, well-nourished, and in no distress.   HENT:   Head: Normocephalic and atraumatic.   Eyes: Conjunctivae and EOM are normal.   Cardiovascular: Normal rate, regular rhythm and normal heart sounds.    Pulmonary/Chest: Effort normal and breath sounds normal.   Abdominal: Soft. Bowel sounds are normal.   Neurological: She is alert and oriented to person, place, and time.   Slow careful gait.  Walker in room   Skin: Skin is warm and dry.     Psychiatric: Mood and affect normal.   Musculoskeletal:   4.5/5 UE and LE proximal strength bilaterally  28 joint count: 10 tender and 10 swollen.  Swollen and tender MCPs  (Unchanged)           LABS    Component      Latest Ref Rng & Units 8/23/2019 8/9/2019 5/23/2019   WBC      3.90 - 12.70 K/uL 5.59 4.79    RBC      4.00 - 5.40 M/uL 3.00 (L) 3.02 (L)    Hemoglobin      12.0 - 16.0 g/dL 10.3 (L) 10.4 (L)    Hematocrit      37.0 - 48.5 % 32.4 (L) 31.6 (L)    MCV      82 - 98 fL 108 (H) 105 (H)    MCH      27.0 - 31.0 pg 34.3 (H) 34.4 (H)    MCHC      32.0 - 36.0 g/dL 31.8 (L) 32.9    RDW      11.5 - 14.5 % 13.4 13.9    Platelets      150 - 350 K/uL 299 257    MPV      9.2 - 12.9 fL 9.8 8.5 (L)    Immature Granulocytes      0.0 - 0.5 % 0.5     Gran # (ANC)      1.8 - 7.7 K/uL 3.7 3.0    Immature Grans (Abs)      0.00 - 0.04 K/uL 0.03     Lymph #      1.0 - 4.8 K/uL 1.6 1.5    Mono #      0.3 - 1.0 K/uL 0.3 0.2 (L)    Eos #      0.0 - 0.5 K/uL 0.1 0.1    Baso #      0.00 - 0.20 K/uL 0.02 0.01    nRBC      0 /100 WBC 0     Gran%      38.0 - 73.0 % 65.2 62.6    Lymph%      18.0 - 48.0 % 27.9 31.3    Mono%      4.0 - 15.0 % 4.7 4.6    Eosinophil%      0.0 - 8.0 % 1.3 1.7    Basophil%      0.0 - 1.9 % 0.4 0.2    Differential Method       Automated Automated    Sodium      136 - 145 mmol/L   140   Potassium      3.5 - 5.1 mmol/L   3.9   Chloride      95 - 110 mmol/L   104   CO2      23 - 29 mmol/L   30 (H)   Glucose      70 - 110 mg/dL   144 (H)   BUN, Bld      8 - 23 mg/dL    15   Creatinine      0.5 - 1.4 mg/dL   1.0   Calcium      8.7 - 10.5 mg/dL   9.5   PROTEIN TOTAL      6.0 - 8.4 g/dL   6.7   Albumin      3.5 - 5.2 g/dL   3.6   BILIRUBIN TOTAL      0.1 - 1.0 mg/dL   0.4   Alkaline Phosphatase      55 - 135 U/L   89   AST      10 - 40 U/L   14   ALT      10 - 44 U/L   14   Anion Gap      8 - 16 mmol/L   6 (L)   eGFR if African American      >60 mL/min/1.73 m:2   >60.0   eGFR if non African American      >60 mL/min/1.73 m:2   56.0 (A)   Iron      30 - 160 ug/dL 90     Transferrin      200 - 375 mg/dL 273     TIBC      250 - 450 ug/dL 404     Saturated Iron      20 - 50 % 22     CPK      20 - 180 U/L   131   CRP      0.0 - 8.2 mg/L   3.5   Sed Rate      0 - 36 mm/Hr   <2   Aldolase      1.2 - 7.6 U/L   2.1   Ferritin      20.0 - 300.0 ng/mL 342 (H)            Assessment:       1.   Sarcoidosis. Manifested by myopathy and arthropathy. Persistent joint pain and myalgias despite prednisone 10 mg.  Now with diffuse body pain    2.   Myalgia and myositis.    3.   Osteopenia. Took Fosamax for 5 years stopped 6/2013.  Awaiting patient decision on Prolia after she sees dentis.    4.   Fatigue     5.   Diabetes mellitus type 2 in nonobese.  Last      6.           Neck pain. X-ray with degenerative changes. S/p laminectomy-cervical fusion C3-C7 11/16/2015 for cervical spinal stenosis.    7.           Back pain    8.           HTN.  9.           SOB.  When blood count low  10.         Anemia.  On Procrit    Plan:       1. Labs   2. Kenalog 80 mg IM.  Continue prednisone 5 mg daily daily. Continue at 6 tabs MTX since labs today showed decrease in GFR.  Continue folic acid.   3. [employer] stopped tramadol.  Still on hydrocodone/acetaminophen from primary doctor.  [employer] now stopped Flexeril.  Will switch to tizanidine.  Risk of sedation discussed.  Patient to try 2 mg first and contact office.    4. Following with nephrology  5. DEXA with osteopenia of hip total and femoral neck. FRAX does not  suggest treatment however with prednisone>7.5 mg will consider Prolia (due renal insufficiency).  Information provided for patient to review.  She read information but did not see dentist to have teeth pulled.  6.  Follow with Dr. Conner regarding spine issues.                 RTO in 3-4 months.    Patient seen face to face for 25 minutes and greater than 50% spent in counseling regarding Joint pains,   management of sarcoidosis and pain.

## 2019-08-30 ENCOUNTER — PATIENT MESSAGE (OUTPATIENT)
Dept: RHEUMATOLOGY | Facility: CLINIC | Age: 74
End: 2019-08-30

## 2019-08-30 ENCOUNTER — TELEPHONE (OUTPATIENT)
Dept: HEMATOLOGY/ONCOLOGY | Facility: CLINIC | Age: 74
End: 2019-08-30

## 2019-08-30 NOTE — TELEPHONE ENCOUNTER
I called to confirm the patient appointment. The patient's  stated that she is out of town and unsure if she will make the appointment or not.

## 2019-09-03 ENCOUNTER — OFFICE VISIT (OUTPATIENT)
Dept: HEMATOLOGY/ONCOLOGY | Facility: CLINIC | Age: 74
End: 2019-09-03
Payer: MEDICARE

## 2019-09-03 VITALS
DIASTOLIC BLOOD PRESSURE: 88 MMHG | BODY MASS INDEX: 23.14 KG/M2 | HEIGHT: 61 IN | SYSTOLIC BLOOD PRESSURE: 168 MMHG | HEART RATE: 91 BPM | WEIGHT: 122.56 LBS | OXYGEN SATURATION: 98 % | TEMPERATURE: 99 F

## 2019-09-03 DIAGNOSIS — N18.9 ANEMIA IN CHRONIC KIDNEY DISEASE, UNSPECIFIED CKD STAGE: Primary | ICD-10-CM

## 2019-09-03 DIAGNOSIS — D86.9 SARCOIDOSIS: Chronic | ICD-10-CM

## 2019-09-03 DIAGNOSIS — D63.1 ANEMIA IN CHRONIC KIDNEY DISEASE, UNSPECIFIED CKD STAGE: Primary | ICD-10-CM

## 2019-09-03 DIAGNOSIS — N18.9 CHRONIC KIDNEY DISEASE, UNSPECIFIED CKD STAGE: ICD-10-CM

## 2019-09-03 PROCEDURE — 99214 OFFICE O/P EST MOD 30 MIN: CPT | Mod: HCNC,S$GLB,, | Performed by: INTERNAL MEDICINE

## 2019-09-03 PROCEDURE — 99214 PR OFFICE/OUTPT VISIT, EST, LEVL IV, 30-39 MIN: ICD-10-PCS | Mod: HCNC,S$GLB,, | Performed by: INTERNAL MEDICINE

## 2019-09-03 PROCEDURE — 99999 PR PBB SHADOW E&M-EST. PATIENT-LVL V: CPT | Mod: PBBFAC,HCNC,, | Performed by: INTERNAL MEDICINE

## 2019-09-03 PROCEDURE — 3079F DIAST BP 80-89 MM HG: CPT | Mod: HCNC,CPTII,S$GLB, | Performed by: INTERNAL MEDICINE

## 2019-09-03 PROCEDURE — 3077F SYST BP >= 140 MM HG: CPT | Mod: HCNC,CPTII,S$GLB, | Performed by: INTERNAL MEDICINE

## 2019-09-03 PROCEDURE — 1101F PT FALLS ASSESS-DOCD LE1/YR: CPT | Mod: HCNC,CPTII,S$GLB, | Performed by: INTERNAL MEDICINE

## 2019-09-03 PROCEDURE — 1101F PR PT FALLS ASSESS DOC 0-1 FALLS W/OUT INJ PAST YR: ICD-10-PCS | Mod: HCNC,CPTII,S$GLB, | Performed by: INTERNAL MEDICINE

## 2019-09-03 PROCEDURE — 3077F PR MOST RECENT SYSTOLIC BLOOD PRESSURE >= 140 MM HG: ICD-10-PCS | Mod: HCNC,CPTII,S$GLB, | Performed by: INTERNAL MEDICINE

## 2019-09-03 PROCEDURE — 3079F PR MOST RECENT DIASTOLIC BLOOD PRESSURE 80-89 MM HG: ICD-10-PCS | Mod: HCNC,CPTII,S$GLB, | Performed by: INTERNAL MEDICINE

## 2019-09-03 PROCEDURE — 99999 PR PBB SHADOW E&M-EST. PATIENT-LVL V: ICD-10-PCS | Mod: PBBFAC,HCNC,, | Performed by: INTERNAL MEDICINE

## 2019-09-03 NOTE — PROGRESS NOTES
Subjective:       Patient ID: Regino Lawrence is a 73 y.o. female.    Chief Complaint: Follow-up  Diagnosis: Anemia in CKD  Patient is a [employer]  .  HPI     Pt arrived >10 min late for visit     The patient is seen today for chronic anemia in CKD. The patient reports that she has been diagnosed with JOLLY in the past.  She has been on oral iron supplementation therapy, but could not tolerate or did not respond, she is uncertain.  She is followed by GI and has undergone a colonoscopy earlier this year and was diagnosed with hemorrhoids for which she underwent banding procedure.  No melena, hematochezia,change in bowel habits.  She has also been diagnosed with B12 deficiency in the past, but reports she did not respond to B12 injections. No history of blood transfusions.  She is a [employer].  She reports that she remembers getting injections in the 1970s when her blood count was low.        She is followed by Rheumatology for history of sarcoidosis with associated myopathy and arthropathy.   .She has been treated in the  past with methotrexate and Plaquenil, both of which were ineffective  Cellcept and imuran caused some unknown side effect.   Colchicine  held due to low WBC       Chronic diffuse arthralgias-stable     She was undergoing Procrit therapy q 2wks  Procrit therapy on Hold since 7/26/2019 Hb 11.3g/dl  She also has  undergone intermittent IV iron therapy  Hb 10.3 g/dl   No SOB/CP  Appetite and weight stable     She is followed by pain mgmt for chronic hip pain   She was diagnosed with bursitis of hip and recently underwent steroid inj  history of cervical spinal stenosis s/p posterior C3-C7 laminectomy and fusion on 11/16/15 by Dr. Conner.   She was referred to Dr. Richards for a left L5 transforaminal injection which was completed on 10/8/18.      CBC  reveals wbc 5590/mm3  Hb 10.3  g/dl Hct 32.4% Plt ct 299      Patient was in the ED 07/27/2018 and was seen on 7/17  at [facility] for [facility] issues  She  "is followed by Neurosurgery for cervical spinal stenosis s/p posterior C3-C7 laminectomy and fusion      PAST MEDICAL HISTORY:  Acid reflux, alopecia, anemia, anxiety disorder, chronic  kidney disease, depression, diabetes mellitus type 2, hyperlipidemia,  hypertension, hypothyroidism, osteoporosis, sarcoidosis.    PAST SURGICAL HISTORY:  Cholecystectomy, , tubal ligation, carpal tunnel release, cataracts.    FAMILY HISTORY: Unremarkable for cancer. Significant for HTN.       Review of Systems   Constitutional: Positive for fatigue. Negative for activity change and appetite change.   HENT: Negative for hearing loss and nosebleeds.    Eyes: Negative for visual disturbance.   Respiratory: Negative for cough and shortness of breath.    Cardiovascular: Negative for chest pain and leg swelling.   Gastrointestinal: Negative for abdominal pain, constipation, diarrhea and nausea.   Genitourinary: Negative for flank pain and urgency.   Musculoskeletal: Positive for arthralgias and back pain. Negative for gait problem and joint swelling.   Skin: Negative for rash.        No petechiae, ecchymoses   Neurological: Negative for light-headedness and headaches.   Hematological: Negative for adenopathy. Does not bruise/bleed easily.       Objective:       .  Vitals:    19 1044 19 1051   BP: (!) 173/89 (!) 168/88   BP Location: Left arm Left arm   Patient Position: Sitting Sitting   BP Method: Medium (Automatic) Medium (Manual)   Pulse: 91    Temp: 98.5 °F (36.9 °C)    TempSrc: Oral    SpO2: 98%    Weight: 55.6 kg (122 lb 9.2 oz)    Height: 5' 1" (1.549 m)        Physical Exam   Constitutional: She is oriented to person, place, and time. She appears well-developed and well-nourished.   HENT:   Head: Normocephalic.   Mouth/Throat: Oropharynx is clear and moist. No oropharyngeal exudate.   Eyes: Pupils are equal, round, and reactive to light. Conjunctivae and lids are normal. No scleral icterus.   Neck: Normal " range of motion. Neck supple. No thyromegaly present.   Cardiovascular: Normal rate, regular rhythm and normal heart sounds.   No murmur heard.  Pulmonary/Chest: Breath sounds normal. She has no wheezes. She has no rales.   Abdominal: Soft. Bowel sounds are normal. She exhibits no distension and no mass. There is no hepatosplenomegaly. There is no tenderness. There is no rebound and no guarding.   Musculoskeletal: Normal range of motion. She exhibits no edema or tenderness.   Neurological: She is alert and oriented to person, place, and time. No cranial nerve deficit. Coordination normal.   Skin: Skin is warm and dry. No ecchymosis, no petechiae and no rash noted. No erythema.   Psychiatric: She has a normal mood and affect.             Lab Results   Component Value Date    WBC 5.59 08/23/2019    HGB 10.3 (L) 08/23/2019    HCT 32.4 (L) 08/23/2019     (H) 08/23/2019     08/23/2019     Lab Results   Component Value Date    IRON 90 08/23/2019    TIBC 404 08/23/2019    FERRITIN 342 (H) 08/23/2019     SPEP-nl          CT renal 3/6/2017   IMPRESSION:  1.  No renal, ureteral or bladder calculi.  No hydronephrosis or ureterectasis.  2.  Poorly distended bladder.  Mild bladder wall prominence.  Mild cystitis cannot be   excluded.  3.  Moderate constipation.  Normal appendix      Lab Results   Component Value Date    IRON 90 08/23/2019    TIBC 404 08/23/2019    FERRITIN 342 (H) 08/23/2019     Lab Results   Component Value Date    WBC 5.59 08/23/2019    HGB 10.3 (L) 08/23/2019    HCT 32.4 (L) 08/23/2019     (H) 08/23/2019     08/23/2019         Assessment:       1. Anemia in chronic kidney disease, unspecified CKD stage    2. Chronic kidney disease, unspecified CKD stage    3. Sarcoidosis with persistent joint pain and myopathy        Plan:   1-3   Pt clinically stable  Pt is a Faith and declines/not interested in blood and blood products due to [employer]ism beliefs  Hb 10.3   g/dl    Ferritin 342  Fe sats 22  Re start procrit  20,000u q 2wk ( per lab parameters)  Pt followed by Nephrology . It has been determined early CKD stage III, suspect due to age-related renal nephron loss, along with possible diabetic nephropathy v. hypertensive nephrosclerosis    Procrit has been held ( needs to be re auth)   CBC q2wks STANDING    Follow-up with PCP for med mgmt      Advance Care Planning     Power of   I initiated the process of advance care planning today and explained the importance of this process to the patient.  I introduced the concept of advance directives to the patient, as well. Then the patient received detailed information about the importance of designating a Health Care Power of  (HCPOA). She was also instructed to communicate with this person about their wishes for future healthcare, should she become sick and lose decision-making capacity. The patient has  previously appointed a HCPOA. Pt completed in 2015             F/u 2 mos with Fe studies    CC: Micaela Mendoza M.D.

## 2019-09-03 NOTE — Clinical Note
Cbc q 2wks and procrit ( per parameters) Fe studies prior to f/u 2 mos lapalcoSchedule f/u with Dr. Lau( established pt ) worsening kidney fxnBP check withPCP Dr. Reji christianson

## 2019-09-05 DIAGNOSIS — G72.9 MYOPATHY: ICD-10-CM

## 2019-09-05 DIAGNOSIS — D86.9 SARCOIDOSIS: Chronic | ICD-10-CM

## 2019-09-05 RX ORDER — FOLIC ACID 1 MG/1
1000 TABLET ORAL DAILY
Qty: 90 TABLET | Refills: 0 | Status: SHIPPED | OUTPATIENT
Start: 2019-09-05 | End: 2019-12-16 | Stop reason: SDUPTHER

## 2019-09-06 ENCOUNTER — INFUSION (OUTPATIENT)
Dept: INFUSION THERAPY | Facility: HOSPITAL | Age: 74
End: 2019-09-06
Attending: INTERNAL MEDICINE
Payer: MEDICARE

## 2019-09-06 ENCOUNTER — LAB VISIT (OUTPATIENT)
Dept: LAB | Facility: HOSPITAL | Age: 74
End: 2019-09-06
Attending: INTERNAL MEDICINE
Payer: MEDICARE

## 2019-09-06 VITALS
DIASTOLIC BLOOD PRESSURE: 83 MMHG | HEART RATE: 85 BPM | TEMPERATURE: 98 F | RESPIRATION RATE: 17 BRPM | SYSTOLIC BLOOD PRESSURE: 162 MMHG | OXYGEN SATURATION: 96 %

## 2019-09-06 DIAGNOSIS — N18.9 ANEMIA IN CHRONIC KIDNEY DISEASE, UNSPECIFIED CKD STAGE: Primary | ICD-10-CM

## 2019-09-06 DIAGNOSIS — D63.1 ANEMIA IN CHRONIC KIDNEY DISEASE, UNSPECIFIED CKD STAGE: Primary | ICD-10-CM

## 2019-09-06 DIAGNOSIS — D50.9 IRON DEFICIENCY ANEMIA, UNSPECIFIED IRON DEFICIENCY ANEMIA TYPE: ICD-10-CM

## 2019-09-06 DIAGNOSIS — M79.10 MYALGIA: ICD-10-CM

## 2019-09-06 DIAGNOSIS — Z53.1 REFUSAL OF BLOOD TRANSFUSIONS AS PATIENT IS JEHOVAH'S WITNESS: ICD-10-CM

## 2019-09-06 DIAGNOSIS — D86.9 SARCOIDOSIS: ICD-10-CM

## 2019-09-06 LAB
BASOPHILS # BLD AUTO: 0 K/UL (ref 0–0.2)
BASOPHILS NFR BLD: 0 % (ref 0–1.9)
DIFFERENTIAL METHOD: ABNORMAL
EOSINOPHIL # BLD AUTO: 0 K/UL (ref 0–0.5)
EOSINOPHIL NFR BLD: 0.6 % (ref 0–8)
ERYTHROCYTE [DISTWIDTH] IN BLOOD BY AUTOMATED COUNT: 13.1 % (ref 11.5–14.5)
HCT VFR BLD AUTO: 31.2 % (ref 37–48.5)
HGB BLD-MCNC: 10 G/DL (ref 12–16)
LYMPHOCYTES # BLD AUTO: 1.3 K/UL (ref 1–4.8)
LYMPHOCYTES NFR BLD: 18 % (ref 18–48)
MCH RBC QN AUTO: 33.8 PG (ref 27–31)
MCHC RBC AUTO-ENTMCNC: 32.1 G/DL (ref 32–36)
MCV RBC AUTO: 105 FL (ref 82–98)
MONOCYTES # BLD AUTO: 0.5 K/UL (ref 0.3–1)
MONOCYTES NFR BLD: 6.4 % (ref 4–15)
NEUTROPHILS # BLD AUTO: 5.3 K/UL (ref 1.8–7.7)
NEUTROPHILS NFR BLD: 75 % (ref 38–73)
PLATELET # BLD AUTO: 301 K/UL (ref 150–350)
PMV BLD AUTO: 8.6 FL (ref 9.2–12.9)
RBC # BLD AUTO: 2.96 M/UL (ref 4–5.4)
WBC # BLD AUTO: 7.15 K/UL (ref 3.9–12.7)

## 2019-09-06 PROCEDURE — 63600175 PHARM REV CODE 636 W HCPCS: Mod: HCNC | Performed by: INTERNAL MEDICINE

## 2019-09-06 PROCEDURE — 96372 THER/PROPH/DIAG INJ SC/IM: CPT | Mod: HCNC

## 2019-09-06 PROCEDURE — 85025 COMPLETE CBC W/AUTO DIFF WBC: CPT | Mod: HCNC

## 2019-09-06 PROCEDURE — 36415 COLL VENOUS BLD VENIPUNCTURE: CPT | Mod: HCNC

## 2019-09-06 RX ADMIN — EPOETIN ALFA-EPBX 20000 UNITS: 10000 INJECTION, SOLUTION INTRAVENOUS; SUBCUTANEOUS at 11:09

## 2019-09-06 NOTE — PLAN OF CARE
Problem: Adult Inpatient Plan of Care  Goal: Patient-Specific Goal (Individualization)  Outcome: Ongoing (interventions implemented as appropriate)  Pt tolerated retacrit injection without issue. VSS. Drug info reviewed. AVS given. Pt d.c home in stable condition with instructions to return 9/20/19. In interim, pt knows to call clinic with any issues.

## 2019-09-09 ENCOUNTER — OFFICE VISIT (OUTPATIENT)
Dept: OPHTHALMOLOGY | Facility: CLINIC | Age: 74
End: 2019-09-09
Attending: OPHTHALMOLOGY
Payer: MEDICARE

## 2019-09-09 VITALS — HEART RATE: 84 BPM | DIASTOLIC BLOOD PRESSURE: 80 MMHG | SYSTOLIC BLOOD PRESSURE: 143 MMHG

## 2019-09-09 DIAGNOSIS — H34.8120 HEMISPHERIC RETINAL VEIN OCCLUSION WITH MACULAR EDEMA OF LEFT EYE: ICD-10-CM

## 2019-09-09 PROCEDURE — 99999 PR PBB SHADOW E&M-EST. PATIENT-LVL III: CPT | Mod: PBBFAC,HCNC,, | Performed by: OPHTHALMOLOGY

## 2019-09-09 PROCEDURE — 99999 PR PBB SHADOW E&M-EST. PATIENT-LVL III: ICD-10-PCS | Mod: PBBFAC,HCNC,, | Performed by: OPHTHALMOLOGY

## 2019-09-09 PROCEDURE — 92134 POSTERIOR SEGMENT OCT RETINA (OCULAR COHERENCE TOMOGRAPHY)-BOTH EYES: ICD-10-PCS | Mod: HCNC,S$GLB,, | Performed by: OPHTHALMOLOGY

## 2019-09-09 PROCEDURE — 92134 CPTRZ OPH DX IMG PST SGM RTA: CPT | Mod: HCNC,S$GLB,, | Performed by: OPHTHALMOLOGY

## 2019-09-09 PROCEDURE — 99499 NO LOS: ICD-10-PCS | Mod: HCNC,S$GLB,, | Performed by: OPHTHALMOLOGY

## 2019-09-09 PROCEDURE — 99499 UNLISTED E&M SERVICE: CPT | Mod: HCNC,S$GLB,, | Performed by: OPHTHALMOLOGY

## 2019-09-09 PROCEDURE — 67028 PR INJECT INTRAVITREAL PHARMCOLOGIC: ICD-10-PCS | Mod: HCNC,LT,S$GLB, | Performed by: OPHTHALMOLOGY

## 2019-09-09 PROCEDURE — 67028 INJECTION EYE DRUG: CPT | Mod: HCNC,LT,S$GLB, | Performed by: OPHTHALMOLOGY

## 2019-09-09 RX ADMIN — Medication 1.25 MG: at 11:09

## 2019-09-09 NOTE — PATIENT INSTRUCTIONS

## 2019-09-10 NOTE — PROGRESS NOTES
Subjective:       Patient ID: Regino Lawrence is a 73 y.o. female      Chief Complaint   Patient presents with    CRVO OD     History of Present Illness  HPI     1 Month follow up  HX;  CRVO OD    C/O Vision in OS stable, vision in OD slowly decreasing.    Last edited by Corey Alexander on 9/9/2019  9:48 AM. (History)        Imaging:    See report    Assessment/Plan:     1. Hemispheric retinal vein occlusion with macular edema of left eye  Doing well 6 wks after Avastin  Rec Avastin OS today and TREX to 7-8 wks    - Posterior Segment OCT Retina-Both eyes  - Prior Authorization Order  - Posterior Segment OCT Retina-Both eyes; Future    Follow up in about 7 weeks (around 10/28/2019), or if symptoms worsen or fail to improve, for OCT and INJECTION ONLY, Avastin, Injection Left eye.     Patient identified.  Timeout performed.    Risks, benefits, and alternatives to treatment were discussed in detail with the patient, including bleeding/infection (endophthalmitis)/etc.  The patient voiced understanding and wished to proceed with the procedure.  See separate consent form.    Injection Procedure Note:  Diagnosis: RVO with ME Left Eye    Topical Proparacaine drop placed then topical 5% Betadine  Sterile gloves used, and sterile lid speculum placed.  5% Betadine placed at injection site again prior to injection.  Avastin 1.25mg in 0.05cc Injected inferotemporally 3.5-4mm posterior to the limbus.  Complications: None  Va at least CF at 5 feet post injection.  Retina, ONH, IOP normal after injection.    Followup as above.  Patient should return immediately PRN.  Retinal Detachment and Endophthalmitis precautions given.

## 2019-09-12 ENCOUNTER — HOSPITAL ENCOUNTER (OUTPATIENT)
Facility: OTHER | Age: 74
Discharge: HOME | End: 2019-09-12
Attending: ANESTHESIOLOGY | Admitting: ANESTHESIOLOGY
Payer: MEDICARE

## 2019-09-12 VITALS
RESPIRATION RATE: 18 BRPM | TEMPERATURE: 98 F | OXYGEN SATURATION: 95 % | DIASTOLIC BLOOD PRESSURE: 86 MMHG | SYSTOLIC BLOOD PRESSURE: 184 MMHG | HEART RATE: 85 BPM

## 2019-09-12 DIAGNOSIS — G89.29 CHRONIC PAIN: ICD-10-CM

## 2019-09-12 DIAGNOSIS — M46.1 SACROILIITIS: Primary | ICD-10-CM

## 2019-09-12 DIAGNOSIS — G89.4 CHRONIC PAIN SYNDROME: ICD-10-CM

## 2019-09-12 LAB — POCT GLUCOSE: 179 MG/DL (ref 70–110)

## 2019-09-12 PROCEDURE — 20610 DRAIN/INJ JOINT/BURSA W/O US: CPT | Mod: HCNC,LT,, | Performed by: ANESTHESIOLOGY

## 2019-09-12 PROCEDURE — 77002 NEEDLE LOCALIZATION BY XRAY: CPT | Mod: 26,HCNC,, | Performed by: ANESTHESIOLOGY

## 2019-09-12 PROCEDURE — 82947 ASSAY GLUCOSE BLOOD QUANT: CPT | Mod: HCNC | Performed by: ANESTHESIOLOGY

## 2019-09-12 PROCEDURE — 20610 PR DRAIN/INJECT LARGE JOINT/BURSA: ICD-10-PCS | Mod: 59,HCNC,LT, | Performed by: ANESTHESIOLOGY

## 2019-09-12 PROCEDURE — 25000003 PHARM REV CODE 250: Mod: HCNC | Performed by: ANESTHESIOLOGY

## 2019-09-12 PROCEDURE — 77002 NEEDLE LOCALIZATION BY XRAY: CPT | Mod: HCNC | Performed by: ANESTHESIOLOGY

## 2019-09-12 PROCEDURE — 63600175 PHARM REV CODE 636 W HCPCS: Mod: HCNC | Performed by: ANESTHESIOLOGY

## 2019-09-12 PROCEDURE — 77002 PR FLUOROSCOPIC GUIDANCE NEEDLE PLACEMENT: ICD-10-PCS | Mod: 26,HCNC,, | Performed by: ANESTHESIOLOGY

## 2019-09-12 PROCEDURE — 25500020 PHARM REV CODE 255: Mod: HCNC | Performed by: ANESTHESIOLOGY

## 2019-09-12 PROCEDURE — 20610 DRAIN/INJ JOINT/BURSA W/O US: CPT | Mod: HCNC | Performed by: ANESTHESIOLOGY

## 2019-09-12 RX ORDER — TRIAMCINOLONE ACETONIDE 40 MG/ML
INJECTION, SUSPENSION INTRA-ARTICULAR; INTRAMUSCULAR
Status: DISCONTINUED | OUTPATIENT
Start: 2019-09-12 | End: 2019-09-12 | Stop reason: HOSPADM

## 2019-09-12 RX ORDER — SODIUM CHLORIDE 9 MG/ML
500 INJECTION, SOLUTION INTRAVENOUS CONTINUOUS
Status: DISCONTINUED | OUTPATIENT
Start: 2019-09-12 | End: 2019-09-12 | Stop reason: HOSPADM

## 2019-09-12 RX ORDER — LIDOCAINE HYDROCHLORIDE 10 MG/ML
INJECTION INFILTRATION; PERINEURAL
Status: DISCONTINUED | OUTPATIENT
Start: 2019-09-12 | End: 2019-09-12 | Stop reason: HOSPADM

## 2019-09-12 RX ORDER — BUPIVACAINE HYDROCHLORIDE 2.5 MG/ML
INJECTION, SOLUTION EPIDURAL; INFILTRATION; INTRACAUDAL
Status: DISCONTINUED | OUTPATIENT
Start: 2019-09-12 | End: 2019-09-12 | Stop reason: HOSPADM

## 2019-09-12 RX ORDER — ALPRAZOLAM 0.5 MG/1
0.5 TABLET ORAL
Status: COMPLETED | OUTPATIENT
Start: 2019-09-12 | End: 2019-09-12

## 2019-09-12 RX ADMIN — ALPRAZOLAM 0.5 MG: 0.5 TABLET ORAL at 10:09

## 2019-09-12 NOTE — OP NOTE
Hip Injection/Arthrogram LATERAL APPROACH  Time-out taken to identify patient and procedure side prior to starting the procedure.   I attest that I have reviewed the patient's home medications prior to the procedure and no contraindication have been identified. I  re-evaluated the patient after the patient was positioned for the procedure in the procedure room immediately before the procedural time-out. The vital signs are current and represent the current state of the patient which has not significantly changed since the preprocedure assessment.                                                                                                            Date of Service: 09/12/2019    PCP: Micaela Mendoza MD    Referring Physician:                                                                                                PROCEDURE:  Left hip joint and left hip GTB injection under fluoroscopy.    REASON FOR PROCEDURE: left hip Osteoarthritis of left hip, left hip greater trochanteric bursitis unspecified osteoarthritis type [M16.12]     1. Sacroiliitis    2. Chronic pain syndrome    3. Chronic pain      POSTOP DIAGNOSIS: left hip Osteoarthritis of left hip, unspecified osteoarthritis type [M16.12]   left hip greater trochanteric bursitis   1. Sacroiliitis    2. Chronic pain syndrome    3. Chronic pain        PHYSICIAN: Alfonso Richards MD  ASSISTANTS: Ever Resendez MD,  fellow                                                                                               ANESTHESIA:  Xylocaine 1% 9ml with Sodium Bicarbonate 1ml. 3ml per site.                                                                                                              MEDICATIONS INJECTED:  Kenalog 20mg, bupivacaine 0.25% 10 mL (per side), 4 ml intraarticular. The remaining in the bursa and tendon sheath                                                                             ESTIMATED BLOOD LOSS:  None.                                                                                                                               COMPLICATIONS:  None.        SEDATION: None                                                                                                                                 TECHNIQUE:  With the patient lying in the prone position the same side greater    trochanter was palpated.  The area was prepped and draped in the usual       sterile fashion.  Local anesthetic was used, given by raising a wheal and    going down to the hub of the 27-gauge needle.  A 3-1/2 inch 22-gauge         needle was introduced under fluoroscopy until the tip reached the greater    trochanter. Medication injected after contrast confirmation. Then the tip of the needle was hinged cephalad from the greater trochanter into the joint space.  When the tip of the needle was thought to be in appropriate position, contrast was injected to confirm proper placement.  Medication was then injected slowly.  The patient tolerated the procedure well.                                                                                                                     PAIN BEFORE THE PROCEDURE: 10/10.                                                                                                                         PAIN AFTER THE PROCEDURE: 0/10.                                                                                                                          The patient was monitored for 20-30 minutes after the procedure and was      given post procedure and discharge instructions to follow at home.  The      patient is to follow-up with me in two weeks by calling.   The patient was discharged in a stable condtion.

## 2019-09-12 NOTE — DISCHARGE SUMMARY
Discharge Note  Short Stay      SUMMARY     Admit Date: 9/12/2019    Attending Physician: Alfonso Richards      Discharge Physician: Alfonso Richards      Discharge Date: 9/12/2019 11:32 AM    Procedure(s) (LRB):  INJECTION, JOINT (Left)    Final Diagnosis: Osteoarthritis of left hip, unspecified osteoarthritis type [M16.12]    Disposition: Home or self care    Patient Instructions:   Current Discharge Medication List      CONTINUE these medications which have NOT CHANGED    Details   ACCU-CHEK FASTCLIX LANCING DEV Kit USE AS DIRECTED.  Qty: 1 each, Refills: 0    Associated Diagnoses: Controlled diabetes mellitus type 2 with complications      ACCU-CHEK SMARTVIEW TEST STRIP Strp TEST  ONCE  A  DAY  Qty: 100 strip, Refills: 11      ALCOHOL ANTISEPTIC PADS (ALCOHOL PREP PADS TOP)       atorvastatin (LIPITOR) 20 MG tablet Take 1 tablet (20 mg total) by mouth once daily.  Qty: 90 tablet, Refills: 3    Associated Diagnoses: Combined 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      blood-glucose meter kit Use as instructed  Qty: 1 each, Refills: 0    Comments: AccuCheck  Associated Diagnoses: Controlled type 2 diabetes mellitus without complication, without long-term current use of insulin      calcium carbonate (OS-MALCOLM) 600 mg calcium (1,500 mg) Tab Take 1 tablet by mouth 2 (two) times daily.       carvedilol (COREG) 25 MG tablet Take 1 tablet (25 mg total) by mouth 2 (two) times daily.  Qty: 180 tablet, Refills: 3    Associated Diagnoses: Essential hypertension      cloNIDine (CATAPRES) 0.3 MG tablet TAKE 1 TABLET BY MOUTH THREE TIMES DAILY  Qty: 270 tablet, Refills: 1    Associated Diagnoses: Essential hypertension      epoetin german (PROCRIT INJ) Inject 1,000 Units as directed.      fenofibrate 160 MG Tab Take 1 tablet (160 mg total) by mouth once daily.  Qty: 90 tablet, Refills: 1    Associated Diagnoses: Combined 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      folic acid (FOLVITE) 1 MG tablet Take 1  tablet (1,000 mcg total) by mouth once daily.  Qty: 90 tablet, Refills: 0    Associated Diagnoses: Sarcoidosis; Myopathy      gabapentin (NEURONTIN) 400 MG capsule Take 1 capsule (400 mg total) by mouth 3 (three) times daily.  Qty: 90 capsule, Refills: 1    Associated Diagnoses: Chronic bilateral low back pain with left-sided sciatica      HYDROcodone-acetaminophen (NORCO) 5-325 mg per tablet Take 1 tablet by mouth every 6 (six) hours as needed.  Qty: 90 tablet, Refills: 0    Comments: Medically necessary for more than 7 days  Associated Diagnoses: Degenerative disc disease, lumbar      levothyroxine (SYNTHROID) 50 MCG tablet TAKE 1 TABLET BY MOUTH ONCE DAILY BEFORE BREAKFAST  Qty: 90 tablet, Refills: 1    Associated Diagnoses: Hypothyroidism, unspecified type      metFORMIN (GLUCOPHAGE) 1000 MG tablet TAKE 1 TABLET BY MOUTH TWICE DAILY WITH MEALS  Qty: 180 tablet, Refills: 1    Associated Diagnoses: Diabetes mellitus, type 2; Diabetes mellitus with stage 3 chronic kidney disease      methotrexate 2.5 MG Tab TAKE 6 TABLETS BY MOUTH EVERY 7 DAYS  Qty: 72 tablet, Refills: 0    Comments: Please consider 90 day supplies to promote better adherence  Associated Diagnoses: Sarcoidosis; Myopathy      NIFEdipine (PROCARDIA-XL) 30 MG (OSM) 24 hr tablet Take 1 tablet (30 mg total) by mouth once daily.  Qty: 90 tablet, Refills: 1    Associated Diagnoses: Essential hypertension      predniSONE (DELTASONE) 5 MG tablet Take 1 tablet (5 mg total) by mouth once daily.  Qty: 90 tablet, Refills: 1    Associated Diagnoses: Sarcoidosis; Myopathy      tiZANidine (ZANAFLEX) 2 MG tablet Take 2 tablets (4 mg total) by mouth every 8 (eight) hours as needed.  Qty: 270 tablet, Refills: 0    Associated Diagnoses: Myalgia                 Discharge Diagnosis: Osteoarthritis of left hip, unspecified osteoarthritis type [M16.12]  Condition on Discharge: Stable with no complications to procedure   Diet on Discharge: Same as before.  Activity: as per  instruction sheet.  Discharge to: Home with a responsible adult.  Follow up: 2-4 weeks       Please call my office or pager at [phone] if experienced any weakness or loss of sensation, fever > 101.5, pain uncontrolled with oral medications, persistent nausea/vomiting/or diarrhea, redness or drainage from the incisions, or any other worrisome concerns. If physician on call was not reached or could not communicate with our office for any reason please go to the nearest emergency department

## 2019-09-12 NOTE — DISCHARGE INSTRUCTIONS

## 2019-09-16 ENCOUNTER — PATIENT OUTREACH (OUTPATIENT)
Dept: ADMINISTRATIVE | Facility: OTHER | Age: 74
End: 2019-09-16

## 2019-09-16 DIAGNOSIS — E11.9 TYPE 2 DIABETES MELLITUS WITHOUT COMPLICATION, UNSPECIFIED WHETHER LONG TERM INSULIN USE: Primary | ICD-10-CM

## 2019-09-19 ENCOUNTER — OFFICE VISIT (OUTPATIENT)
Dept: CARDIOLOGY | Facility: CLINIC | Age: 74
End: 2019-09-19
Payer: MEDICARE

## 2019-09-19 VITALS
SYSTOLIC BLOOD PRESSURE: 122 MMHG | HEIGHT: 61 IN | OXYGEN SATURATION: 98 % | DIASTOLIC BLOOD PRESSURE: 72 MMHG | HEART RATE: 67 BPM | WEIGHT: 121.25 LBS | BODY MASS INDEX: 22.89 KG/M2

## 2019-09-19 DIAGNOSIS — E11.69 COMBINED HYPERLIPIDEMIA ASSOCIATED WITH TYPE 2 DIABETES MELLITUS: Chronic | ICD-10-CM

## 2019-09-19 DIAGNOSIS — E78.2 COMBINED HYPERLIPIDEMIA ASSOCIATED WITH TYPE 2 DIABETES MELLITUS: Chronic | ICD-10-CM

## 2019-09-19 DIAGNOSIS — D86.9 SARCOIDOSIS: Chronic | ICD-10-CM

## 2019-09-19 DIAGNOSIS — I10 ESSENTIAL HYPERTENSION: Chronic | ICD-10-CM

## 2019-09-19 DIAGNOSIS — R06.09 DOE (DYSPNEA ON EXERTION): ICD-10-CM

## 2019-09-19 DIAGNOSIS — I10 HYPERTENSION: ICD-10-CM

## 2019-09-19 DIAGNOSIS — E11.3292 CONTROLLED TYPE 2 DIABETES MELLITUS WITH LEFT EYE AFFECTED BY MILD NONPROLIFERATIVE RETINOPATHY WITHOUT MACULAR EDEMA, WITHOUT LONG-TERM CURRENT USE OF INSULIN: Primary | Chronic | ICD-10-CM

## 2019-09-19 DIAGNOSIS — R07.89 CHEST PAIN, ATYPICAL: ICD-10-CM

## 2019-09-19 PROCEDURE — 3078F PR MOST RECENT DIASTOLIC BLOOD PRESSURE < 80 MM HG: ICD-10-PCS | Mod: HCNC,CPTII,S$GLB, | Performed by: INTERNAL MEDICINE

## 2019-09-19 PROCEDURE — 99214 PR OFFICE/OUTPT VISIT, EST, LEVL IV, 30-39 MIN: ICD-10-PCS | Mod: HCNC,S$GLB,, | Performed by: INTERNAL MEDICINE

## 2019-09-19 PROCEDURE — 99999 PR PBB SHADOW E&M-EST. PATIENT-LVL IV: ICD-10-PCS | Mod: PBBFAC,HCNC,, | Performed by: INTERNAL MEDICINE

## 2019-09-19 PROCEDURE — 99999 PR PBB SHADOW E&M-EST. PATIENT-LVL IV: CPT | Mod: PBBFAC,HCNC,, | Performed by: INTERNAL MEDICINE

## 2019-09-19 PROCEDURE — 3074F PR MOST RECENT SYSTOLIC BLOOD PRESSURE < 130 MM HG: ICD-10-PCS | Mod: HCNC,CPTII,S$GLB, | Performed by: INTERNAL MEDICINE

## 2019-09-19 PROCEDURE — 3074F SYST BP LT 130 MM HG: CPT | Mod: HCNC,CPTII,S$GLB, | Performed by: INTERNAL MEDICINE

## 2019-09-19 PROCEDURE — 3044F HG A1C LEVEL LT 7.0%: CPT | Mod: HCNC,CPTII,S$GLB, | Performed by: INTERNAL MEDICINE

## 2019-09-19 PROCEDURE — 99214 OFFICE O/P EST MOD 30 MIN: CPT | Mod: HCNC,S$GLB,, | Performed by: INTERNAL MEDICINE

## 2019-09-19 PROCEDURE — 93000 ELECTROCARDIOGRAM COMPLETE: CPT | Mod: HCNC,S$GLB,, | Performed by: INTERNAL MEDICINE

## 2019-09-19 PROCEDURE — 1101F PT FALLS ASSESS-DOCD LE1/YR: CPT | Mod: HCNC,CPTII,S$GLB, | Performed by: INTERNAL MEDICINE

## 2019-09-19 PROCEDURE — 93000 EKG 12-LEAD: ICD-10-PCS | Mod: HCNC,S$GLB,, | Performed by: INTERNAL MEDICINE

## 2019-09-19 PROCEDURE — 3078F DIAST BP <80 MM HG: CPT | Mod: HCNC,CPTII,S$GLB, | Performed by: INTERNAL MEDICINE

## 2019-09-19 PROCEDURE — 1101F PR PT FALLS ASSESS DOC 0-1 FALLS W/OUT INJ PAST YR: ICD-10-PCS | Mod: HCNC,CPTII,S$GLB, | Performed by: INTERNAL MEDICINE

## 2019-09-19 PROCEDURE — 3044F PR MOST RECENT HEMOGLOBIN A1C LEVEL <7.0%: ICD-10-PCS | Mod: HCNC,CPTII,S$GLB, | Performed by: INTERNAL MEDICINE

## 2019-09-19 RX ORDER — DOXYCYCLINE 100 MG/1
100 CAPSULE ORAL EVERY 12 HOURS
Qty: 14 CAPSULE | Refills: 0 | Status: SHIPPED | OUTPATIENT
Start: 2019-09-19 | End: 2019-10-18

## 2019-09-19 NOTE — PROGRESS NOTES
Subjective:    Patient ID:  Regino Lawrence is a 73 y.o. female who presents for follow-up of Hypertension      HPI     HTN, DM, HLD,  sarcoidosis    Previously saw Dr Holden  HPI: She is schedule for gynecologic surgery on Monday. As part of her preoperative evaluation, an ECG was done which she was told was abnormal. I do not have the ECG to review. She has no cardiac history. Regino Lawrence denies any chest pain, shortness of breath, PND, orthopnea, palpitations, or syncope. Her ECG today is normal. All ECG's reviewed in [facility] were normal. She walks with a cane due to arthritis.     1. Controlled type 2 diabetes mellitus without complication, without long-term current use of insulin    2. Preop cardiovascular exam : She has 0 clinical risk factors according to the RCRI placing her at low risk for a perioperative cardiac complication. She has no symptoms suggestive of cardiopulmonary disease, and her ECG is normal. No further cardiac testing is required prior to her surgery. She should continue on her clonidine during the perioperative period to avoid rebound hypertension.   3. Essential hypertension       Echo 4/11/19  · Normal left ventricular systolic function. The estimated ejection fraction is 55%  · Concentric left ventricular hypertrophy.  · Grade I (mild) left ventricular diastolic dysfunction consistent with impaired relaxation.      Stress test 4/11/19  · Normal myocardial perfusion scan  · There were no arrhythmias during stress.  · There was no ST segment deviation noted during stress.  · The patient reported dizziness, SOB (non-anginal) and light headedness during the stress test.  · The perfusion scan is free of evidence from myocardial ischemia or injury.  · LVEF post-stress is 62%  · The EKG portion of this study is negative for myocardial ischemia.        4/3/19 Reports worsening FAUSTIN for several months - sometimes associated with chest tightness  EKG sinus tachycardia 104 otherwise ok  Denies prior  CAD  Walks with a walker     5/1/19 Still with episodes of chest tightness - usually lasts all day  Not reproducible with palpation   CP atypical - likely musculoskeletal - will observe  OV 3 months      Having issues with hip pain  CP improved  Reports SOB with cough productive of clear mucus  EKG NSR PRWP - similar to previous EKG        Review of Systems   Constitution: Negative for decreased appetite.   HENT: Negative for ear discharge.    Eyes: Negative for blurred vision.   Respiratory: Negative for hemoptysis.    Endocrine: Negative for polyphagia.   Hematologic/Lymphatic: Negative for adenopathy.   Skin: Negative for color change.   Musculoskeletal: Negative for joint swelling.   Genitourinary: Negative for bladder incontinence.   Neurological: Negative for brief paralysis.   Psychiatric/Behavioral: Negative for hallucinations.   Allergic/Immunologic: Negative for hives.        Objective:    Physical Exam   Constitutional: She is oriented to person, place, and time. She appears well-developed and well-nourished.   HENT:   Head: Normocephalic and atraumatic.   Eyes: Pupils are equal, round, and reactive to light. Conjunctivae are normal.   Neck: Normal range of motion. Neck supple.   Cardiovascular: Normal rate, normal heart sounds and intact distal pulses.   Pulmonary/Chest: Effort normal and breath sounds normal.   Abdominal: Soft. Bowel sounds are normal.   Musculoskeletal: Normal range of motion.   Neurological: She is alert and oriented to person, place, and time.   Skin: Skin is warm and dry.         Assessment:       1. 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 Combined 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    3. Essential hypertension    4. Sarcoidosis    5. FAUSTIN (dyspnea on exertion)    6. Chest pain, atypical         Plan:       Will give doxycycline for  URI  Otherwise cardiac stable  OV 6 months

## 2019-09-20 ENCOUNTER — HOSPITAL ENCOUNTER (OUTPATIENT)
Dept: RADIOLOGY | Facility: HOSPITAL | Age: 74
Discharge: HOME | End: 2019-09-20
Attending: FAMILY MEDICINE
Payer: MEDICARE

## 2019-09-20 ENCOUNTER — OFFICE VISIT (OUTPATIENT)
Dept: FAMILY MEDICINE | Facility: CLINIC | Age: 74
End: 2019-09-20
Payer: MEDICARE

## 2019-09-20 VITALS
HEART RATE: 76 BPM | DIASTOLIC BLOOD PRESSURE: 80 MMHG | BODY MASS INDEX: 23.21 KG/M2 | OXYGEN SATURATION: 96 % | TEMPERATURE: 98 F | HEIGHT: 61 IN | WEIGHT: 122.94 LBS | SYSTOLIC BLOOD PRESSURE: 132 MMHG

## 2019-09-20 DIAGNOSIS — D86.9 SARCOIDOSIS: Chronic | ICD-10-CM

## 2019-09-20 DIAGNOSIS — R06.02 SHORTNESS OF BREATH: ICD-10-CM

## 2019-09-20 DIAGNOSIS — R06.02 SHORTNESS OF BREATH: Primary | ICD-10-CM

## 2019-09-20 DIAGNOSIS — D63.1 ANEMIA IN CHRONIC KIDNEY DISEASE, UNSPECIFIED CKD STAGE: ICD-10-CM

## 2019-09-20 DIAGNOSIS — N18.9 ANEMIA IN CHRONIC KIDNEY DISEASE, UNSPECIFIED CKD STAGE: ICD-10-CM

## 2019-09-20 DIAGNOSIS — I10 ESSENTIAL HYPERTENSION: Chronic | ICD-10-CM

## 2019-09-20 DIAGNOSIS — E11.3292 CONTROLLED TYPE 2 DIABETES MELLITUS WITH LEFT EYE AFFECTED BY MILD NONPROLIFERATIVE RETINOPATHY WITHOUT MACULAR EDEMA, WITHOUT LONG-TERM CURRENT USE OF INSULIN: Chronic | ICD-10-CM

## 2019-09-20 DIAGNOSIS — D84.9 IMMUNOSUPPRESSION: Chronic | ICD-10-CM

## 2019-09-20 PROCEDURE — 99999 PR PBB SHADOW E&M-EST. PATIENT-LVL V: CPT | Mod: PBBFAC,HCNC,, | Performed by: FAMILY MEDICINE

## 2019-09-20 PROCEDURE — 71046 X-RAY EXAM CHEST 2 VIEWS: CPT | Mod: 26,HCNC,, | Performed by: RADIOLOGY

## 2019-09-20 PROCEDURE — 3044F PR MOST RECENT HEMOGLOBIN A1C LEVEL <7.0%: ICD-10-PCS | Mod: HCNC,CPTII,S$GLB, | Performed by: FAMILY MEDICINE

## 2019-09-20 PROCEDURE — 1101F PT FALLS ASSESS-DOCD LE1/YR: CPT | Mod: HCNC,CPTII,S$GLB, | Performed by: FAMILY MEDICINE

## 2019-09-20 PROCEDURE — 3044F HG A1C LEVEL LT 7.0%: CPT | Mod: HCNC,CPTII,S$GLB, | Performed by: FAMILY MEDICINE

## 2019-09-20 PROCEDURE — 3079F PR MOST RECENT DIASTOLIC BLOOD PRESSURE 80-89 MM HG: ICD-10-PCS | Mod: HCNC,CPTII,S$GLB, | Performed by: FAMILY MEDICINE

## 2019-09-20 PROCEDURE — 99499 RISK ADDL DX/OHS AUDIT: ICD-10-PCS | Mod: HCNC,S$GLB,, | Performed by: FAMILY MEDICINE

## 2019-09-20 PROCEDURE — 99999 PR PBB SHADOW E&M-EST. PATIENT-LVL V: ICD-10-PCS | Mod: PBBFAC,HCNC,, | Performed by: FAMILY MEDICINE

## 2019-09-20 PROCEDURE — 99499 UNLISTED E&M SERVICE: CPT | Mod: HCNC,S$GLB,, | Performed by: FAMILY MEDICINE

## 2019-09-20 PROCEDURE — 3075F PR MOST RECENT SYSTOLIC BLOOD PRESS GE 130-139MM HG: ICD-10-PCS | Mod: HCNC,CPTII,S$GLB, | Performed by: FAMILY MEDICINE

## 2019-09-20 PROCEDURE — 3079F DIAST BP 80-89 MM HG: CPT | Mod: HCNC,CPTII,S$GLB, | Performed by: FAMILY MEDICINE

## 2019-09-20 PROCEDURE — 99215 OFFICE O/P EST HI 40 MIN: CPT | Mod: HCNC,S$GLB,, | Performed by: FAMILY MEDICINE

## 2019-09-20 PROCEDURE — 71046 X-RAY EXAM CHEST 2 VIEWS: CPT | Mod: TC,HCNC,FY,PO

## 2019-09-20 PROCEDURE — 1101F PR PT FALLS ASSESS DOC 0-1 FALLS W/OUT INJ PAST YR: ICD-10-PCS | Mod: HCNC,CPTII,S$GLB, | Performed by: FAMILY MEDICINE

## 2019-09-20 PROCEDURE — 71046 XR CHEST PA AND LATERAL: ICD-10-PCS | Mod: 26,HCNC,, | Performed by: RADIOLOGY

## 2019-09-20 PROCEDURE — 3075F SYST BP GE 130 - 139MM HG: CPT | Mod: HCNC,CPTII,S$GLB, | Performed by: FAMILY MEDICINE

## 2019-09-20 PROCEDURE — 99215 PR OFFICE/OUTPT VISIT, EST, LEVL V, 40-54 MIN: ICD-10-PCS | Mod: HCNC,S$GLB,, | Performed by: FAMILY MEDICINE

## 2019-09-20 NOTE — PROGRESS NOTES
Chief Complaint   Patient presents with    Diabetes    Shortness of Breath    Urinary Incontinence       HPI  Regino Lawrence is a 73 y.o. female with multiple medical diagnoses as listed in the medical history and problem list that presents for follow-up for DM. Hx of chronic back pain with herniated disc and sarcoidosis, currently having shortness of breath    Sarcoidosis and myopathy- has had increased pain in her hip, currently taking prednisone 5mg and MTX 6 tabs, unable to increase due to renal function  Having weakness and fatigue    Urinary incontinence- s/p hx of urinary sling, is now wearing depends, having frequency and incontinence    Shortness of breath- has worsened over the past month, no wheezing, has anemia and her blood count has dropped from 11.3 to 10.4, currently taking zyrtec for allergies      PAST MEDICAL HISTORY:  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, without long-term current use of insulin     Depression     Diabetes mellitus, type 2     Eye injury as a child     k-abrasion  od    Hyperlipidemia     Hypertension     Hypothyroidism     Immune deficiency disorder     Immune disorder     Myalgia and myositis 9/6/2012    Osteoporosis     Polyneuropathy     Renal manifestation of secondary diabetes mellitus     Sarcoidosis     Ulcer     no cancer    Urinary incontinence        PAST SURGICAL HISTORY:  Past Surgical History:   Procedure Laterality Date    CARPAL TUNNEL RELEASE      Rt wrist    CATARACT EXTRACTION W/  INTRAOCULAR LENS IMPLANT Right 4/30/2015    Dr. Azevedo    CATARACT EXTRACTION W/  INTRAOCULAR LENS IMPLANT Left 5/21/2015    Dr. Azevedo      SECTION      CHOLECYSTECTOMY      COLPOCLEISIS-- [place] N/A 2016    Performed by [facility]eredith [facility] at [facility] OR    CYSTOSCOPY N/A 2016    [facility] by [facility]eredith [facility] at [facility] OR    INJECTION, JOINT Left 2019    Performed by Alfonso Richards MD at [facility] PAIN MGT    Injection, Joint  fLUOROSCOPIC jOINT iNJECTION (hIP iNJECTION) LEFT ROCH BURSA AS WELL LEFT TROCHANTERIC BURSA Left 3/21/2019    Performed by Alfonso Richards [facility] at [facility] PAIN MGT    Injection, Joint FLUOROSCOPIC JOINT INJECTION (HIP INJECTION) LEFT HIP Left 2019    Performed by Alfonso Richards MD at [facility] PAIN MGT    Injection,steroid,epidural,transforaminal approach    Bilateral L5 Bilateral 2018    Performed by Alfonso Richards [facility] at [facility] PAIN MGT    Injection,steroid,epidural,transforaminal approach  Left L5-S1 Left 10/8/2018    Performed by Alfonso Richards MD at [facility] PAIN MGT    INSERTION-INTRAOCULAR LENS (IOL) Left 2015    [facility] at [facility] OR    INSERTION-INTRAOCULAR LENS (IOL) Right 2015    [facility] at [facility] OR    LAMINECTOMY-CERVICAL/FUSION-POSTERIOR C3-C7 N/A 2015    [facility] OR 2ND FLR    PHACOEMULSIFICATION-ASPIRATION-CATARACT Left 2015    [facility] at [facility] OR    PHACOEMULSIFICATION-ASPIRATION-CATARACT Right 2015    Per[facility] Elio [facility]edo [facility] at [facility]    REPAIR-POSTERIOR N/A 2016    Performed by Meredith Becker [facility] at [facility] OR    SLING-TRANSOBTERATOR TAPE N/A 2016    Performed by Meredith Becker DO at [facility] OR    TUBAL LIGATION         SOCIAL HISTORY:  Social History     Socioeconomic History    Marital status:      Spouse name: Not on file    Number of children: Not on file    Years of education: Not on file    Highest education level: Not on file   Occupational History    Not on file   Social Needs     Financial resource strain: Not on file    Food insecurity:     Worry: Not on file     Inability: Not on file    Transportation needs:     Medical: Not on file     Non-medical: Not on file   Tobacco Use    Smoking status: Never Smoker    Smokeless tobacco: Never Used   Substance and Sexual Activity    Alcohol use: No    Drug use: No    Sexual activity: Not Currently     Partners: Male   Lifestyle    Physical activity:     Days per week: Not on file     Minutes per session: Not on file    Stress: Not on file   Relationships    Social connections:     Talks on phone: Not on file     Gets together: Not on file     Attends Jain service: Not on file     Active member of club or organization: Not on file     Attends meetings of clubs or organizations: Not on file     Relationship status: Not on file   Other Topics Concern    Not on file   Social History Narrative    Not on file       FAMILY HISTORY:  Family History   Problem Relation Age of Onset    Hypertension Mother     Cataracts Mother     No Known Problems Father     Hypertension Maternal Grandmother     Glaucoma Sister     Arthritis Sister     No Known Problems Brother     No Known Problems Maternal Aunt     No Known Problems Maternal Uncle     No Known Problems Paternal Aunt     No Known Problems Paternal Uncle     No Known Problems Maternal Grandfather     No Known Problems Paternal Grandmother     No Known Problems Paternal Grandfather     Kidney failure Sister     Hepatitis Sister     Cancer Sister         bone cancer     Immunodeficiency Sister     Diabetes Son     Hypertension Son     Lupus Neg Hx     Rheum arthritis Neg Hx     Amblyopia Neg Hx     Blindness Neg Hx     Macular degeneration Neg Hx     Retinal detachment Neg Hx     Strabismus Neg Hx     Stroke Neg Hx     Thyroid disease Neg Hx     Endometrial cancer Neg Hx     Vaginal cancer Neg Hx     Cervical cancer Neg Hx        ALLERGIES AND MEDICATIONS:  updated and reviewed.  Review of patient's allergies indicates:   Allergen Reactions    Azathioprine Shortness Of Breath and Other (See Comments)     Fatigue     Current Outpatient Medications   Medication Sig Dispense Refill    ACCU-CHEK FASTCLIX LANCING DEV Kit USE AS DIRECTED. 1 each 0    ACCU-CHEK SMARTVIEW TEST STRIP Strp TEST  ONCE  A   strip 11    ALCOHOL ANTISEPTIC PADS (ALCOHOL PREP PADS TOP)       atorvastatin (LIPITOR) 20 MG tablet Take 1 tablet (20 mg total) by mouth once daily. 90 tablet 3    blood-glucose meter kit Use as instructed 1 each 0    calcium carbonate (OS-MALCOLM) 600 mg calcium (1,500 mg) Tab Take 1 tablet by mouth 2 (two) times daily.       carvedilol (COREG) 25 MG tablet Take 1 tablet (25 mg total) by mouth 2 (two) times daily. 180 tablet 3    cloNIDine (CATAPRES) 0.3 MG tablet TAKE 1 TABLET BY MOUTH THREE TIMES DAILY 270 tablet 1    folic acid (FOLVITE) 1 MG tablet Take 1 tablet (1,000 mcg total) by mouth once daily. 90 tablet 0    gabapentin (NEURONTIN) 400 MG capsule Take 1 capsule (400 mg total) by mouth 3 (three) times daily. 90 capsule 1    levothyroxine (SYNTHROID) 50 MCG tablet TAKE 1 TABLET BY MOUTH ONCE DAILY BEFORE BREAKFAST 90 tablet 1    metFORMIN (GLUCOPHAGE) 1000 MG tablet TAKE 1 TABLET BY MOUTH TWICE DAILY WITH MEALS 180 tablet 1    methotrexate 2.5 MG Tab TAKE 6 TABLETS BY MOUTH EVERY 7 DAYS 72 tablet 0    NIFEdipine (PROCARDIA-XL) 30 MG (OSM) 24 hr tablet Take 1 tablet (30 mg total) by mouth once daily. 90 tablet 1    predniSONE (DELTASONE) 5 MG tablet Take 1 tablet (5 mg total) by mouth once daily. 90 tablet 1    tiZANidine (ZANAFLEX) 2 MG tablet Take 2 tablets (4 mg total) by mouth every 8 (eight) hours as needed. 270 tablet 0    doxycycline (VIBRAMYCIN) 100 MG Cap Take 1 capsule (100 mg total) by mouth every 12 (twelve) hours. 14 capsule 0    epoetin german (PROCRIT INJ) Inject 1,000 Units as directed.      fenofibrate 160 MG Tab Take 1 tablet  "(160 mg total) by mouth once daily. 90 tablet 1     No current facility-administered medications for this visit.        ROS  Review of Systems   Constitutional: Positive for fatigue. Negative for chills, diaphoresis, fever and unexpected weight change.   HENT: Negative for rhinorrhea, sinus pressure, sore throat and tinnitus.    Eyes: Negative for photophobia and visual disturbance.   Respiratory: Positive for shortness of breath. Negative for cough and wheezing.    Cardiovascular: Negative for chest pain and palpitations.   Gastrointestinal: Negative for abdominal pain, blood in stool, constipation, diarrhea, nausea and vomiting.   Genitourinary: Negative for dysuria, flank pain, frequency and vaginal discharge.   Musculoskeletal: Positive for arthralgias. Negative for joint swelling.   Skin: Negative for rash.   Neurological: Positive for weakness. Negative for speech difficulty, light-headedness and headaches.   Psychiatric/Behavioral: Negative for behavioral problems and dysphoric mood.       Physical Exam  Vitals:    09/20/19 1015 09/20/19 1026   BP: (!) 140/86 132/80   BP Location:  Right arm   Patient Position:  Sitting   BP Method:  Small (Automatic)   Pulse: 76    Temp: 98.4 °F (36.9 °C)    TempSrc: Oral    SpO2: 96%    Weight: 55.7 kg (122 lb 14.5 oz)    Height: 5' 1" (1.549 m)     Body mass index is 23.22 kg/m².  Weight: 55.7 kg (122 lb 14.5 oz)   Height: 5' 1" (154.9 cm)     Physical Exam   Constitutional: She appears well-developed and well-nourished. No distress.   HENT:   Head: Normocephalic and atraumatic.   Eyes: EOM are normal.   Neck: Neck supple.   Cardiovascular: Normal rate and regular rhythm. Exam reveals no gallop and no friction rub.   No murmur heard.  Pulmonary/Chest: Effort normal and breath sounds normal. No respiratory distress. She has no wheezes. She has no rales.   Abdominal: She exhibits distension.   Neurological: She is alert.   Skin: Skin is warm and dry. No rash noted. "   Psychiatric: She has a normal mood and affect. Her behavior is normal.   Nursing note and vitals reviewed.      Health Maintenance       Date Due Completion Date    Influenza Vaccine (1) 09/01/2019 8/30/2018    Override on 8/8/2018: Done    Override on 9/27/2017: Done    Hemoglobin A1c 09/12/2019 3/12/2019    Urine Microalbumin 09/12/2019 9/12/2018    Complete Opioid Risk Tool 09/30/2019 (Originally 10/29/1963) ---    Shingles Vaccine (2 of 3) 09/30/2019 (Originally 7/20/2015) 5/25/2015    Lipid Panel 03/12/2020 3/12/2019    DEXA SCAN 05/05/2020 5/5/2017    Foot Exam 06/13/2020 6/13/2019 (Done)    Override on 6/13/2019: Done    Override on 6/19/2018: Done    Override on 5/12/2017: Done    Eye Exam 06/24/2020 6/24/2019    Mammogram 07/25/2020 7/25/2019    Colonoscopy 02/09/2025 2/9/2015 (Done)    Override on 2/9/2015: Done    Override on 2/18/2005: Done (reportedly normal)    TETANUS VACCINE 05/16/2026 5/16/2016          Health maintenance reviewed and addressed as ordered      ASSESSMENT     1. Shortness of breath    2. 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 Essential hypertension    4. Sarcoidosis    5. Immunosuppression    6. 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, without long-term current use of insulin (Chronic)  -due for A1C       Cardiac/Vascular    Essential hypertension (Chronic)  -has been elevated likely due to her chronic pain       Immunology/Multi System    Immunosuppression (Chronic)  -for sarcoidosis    Sarcoidosis (Chronic)  -on prednisone 5mg  -on MTX 6 pills       Oncology    Anemia in CKD (chronic kidney disease)  -stable currently      Other Visit Diagnoses      Shortness of breath    -  Primary  -x ray  -consider PE due to pt immobility    Relevant Orders    X-Ray Chest PA And Lateral    D dimer, quantitative            Micaela Mendoza MD  09/20/2019 10:45 AM        Follow up in about 3 months (around 12/20/2019) for Follow up.

## 2019-09-21 ENCOUNTER — PATIENT MESSAGE (OUTPATIENT)
Dept: FAMILY MEDICINE | Facility: CLINIC | Age: 74
End: 2019-09-21

## 2019-09-21 ENCOUNTER — HOSPITAL ENCOUNTER (OUTPATIENT)
Dept: RADIOLOGY | Facility: HOSPITAL | Age: 74
Discharge: HOME | End: 2019-09-21
Attending: FAMILY MEDICINE
Payer: MEDICARE

## 2019-09-21 ENCOUNTER — TELEPHONE (OUTPATIENT)
Dept: FAMILY MEDICINE | Facility: CLINIC | Age: 74
End: 2019-09-21

## 2019-09-21 ENCOUNTER — HOSPITAL ENCOUNTER (EMERGENCY)
Facility: HOSPITAL | Age: 74
Discharge: HOME | End: 2019-09-21
Attending: EMERGENCY MEDICINE
Payer: MEDICARE

## 2019-09-21 VITALS
WEIGHT: 122 LBS | TEMPERATURE: 98 F | BODY MASS INDEX: 21.62 KG/M2 | HEART RATE: 82 BPM | DIASTOLIC BLOOD PRESSURE: 87 MMHG | RESPIRATION RATE: 17 BRPM | SYSTOLIC BLOOD PRESSURE: 175 MMHG | HEIGHT: 63 IN | OXYGEN SATURATION: 99 %

## 2019-09-21 DIAGNOSIS — R06.02 SOB (SHORTNESS OF BREATH): ICD-10-CM

## 2019-09-21 DIAGNOSIS — R07.9 ACUTE CHEST PAIN: ICD-10-CM

## 2019-09-21 LAB
ALBUMIN SERPL BCP-MCNC: 3.8 G/DL (ref 3.5–5.2)
ALP SERPL-CCNC: 54 U/L (ref 55–135)
ALT SERPL W/O P-5'-P-CCNC: 14 U/L (ref 10–44)
ANION GAP SERPL CALC-SCNC: 11 MMOL/L (ref 8–16)
AST SERPL-CCNC: 12 U/L (ref 10–40)
BASOPHILS # BLD AUTO: 0.01 K/UL (ref 0–0.2)
BASOPHILS NFR BLD: 0.2 % (ref 0–1.9)
BILIRUB SERPL-MCNC: 0.5 MG/DL (ref 0.1–1)
BNP SERPL-MCNC: 75 PG/ML (ref 0–99)
BUN SERPL-MCNC: 17 MG/DL (ref 8–23)
CALCIUM SERPL-MCNC: 9 MG/DL (ref 8.7–10.5)
CHLORIDE SERPL-SCNC: 104 MMOL/L (ref 95–110)
CO2 SERPL-SCNC: 25 MMOL/L (ref 23–29)
CREAT SERPL-MCNC: 1.2 MG/DL (ref 0.5–1.4)
DIFFERENTIAL METHOD: ABNORMAL
EOSINOPHIL # BLD AUTO: 0 K/UL (ref 0–0.5)
EOSINOPHIL NFR BLD: 0.5 % (ref 0–8)
ERYTHROCYTE [DISTWIDTH] IN BLOOD BY AUTOMATED COUNT: 14.2 % (ref 11.5–14.5)
EST. GFR  (AFRICAN AMERICAN): 52 ML/MIN/1.73 M^2
EST. GFR  (NON AFRICAN AMERICAN): 45 ML/MIN/1.73 M^2
GLUCOSE SERPL-MCNC: 119 MG/DL (ref 70–110)
HCT VFR BLD AUTO: 33.2 % (ref 37–48.5)
HGB BLD-MCNC: 10.9 G/DL (ref 12–16)
LYMPHOCYTES # BLD AUTO: 1.3 K/UL (ref 1–4.8)
LYMPHOCYTES NFR BLD: 19.8 % (ref 18–48)
MCH RBC QN AUTO: 35.2 PG (ref 27–31)
MCHC RBC AUTO-ENTMCNC: 32.8 G/DL (ref 32–36)
MCV RBC AUTO: 107 FL (ref 82–98)
MONOCYTES # BLD AUTO: 0.4 K/UL (ref 0.3–1)
MONOCYTES NFR BLD: 6.5 % (ref 4–15)
NEUTROPHILS # BLD AUTO: 4.8 K/UL (ref 1.8–7.7)
NEUTROPHILS NFR BLD: 73.8 % (ref 38–73)
PLATELET # BLD AUTO: 257 K/UL (ref 150–350)
PMV BLD AUTO: 9.2 FL (ref 9.2–12.9)
POTASSIUM SERPL-SCNC: 3.6 MMOL/L (ref 3.5–5.1)
PROT SERPL-MCNC: 6.6 G/DL (ref 6–8.4)
RBC # BLD AUTO: 3.1 M/UL (ref 4–5.4)
SODIUM SERPL-SCNC: 140 MMOL/L (ref 136–145)
TROPONIN I SERPL DL<=0.01 NG/ML-MCNC: <0.006 NG/ML (ref 0–0.03)
WBC # BLD AUTO: 6.61 K/UL (ref 3.9–12.7)

## 2019-09-21 PROCEDURE — 93005 ELECTROCARDIOGRAM TRACING: CPT | Mod: HCNC

## 2019-09-21 PROCEDURE — 99284 EMERGENCY DEPT VISIT MOD MDM: CPT | Mod: 25,HCNC

## 2019-09-21 PROCEDURE — 71275 CT ANGIOGRAPHY CHEST: CPT | Mod: 26,HCNC,, | Performed by: RADIOLOGY

## 2019-09-21 PROCEDURE — 93010 EKG 12-LEAD: ICD-10-PCS | Mod: HCNC,,, | Performed by: INTERNAL MEDICINE

## 2019-09-21 PROCEDURE — 93010 ELECTROCARDIOGRAM REPORT: CPT | Mod: HCNC,,, | Performed by: INTERNAL MEDICINE

## 2019-09-21 PROCEDURE — 96360 HYDRATION IV INFUSION INIT: CPT | Mod: HCNC

## 2019-09-21 PROCEDURE — 84484 ASSAY OF TROPONIN QUANT: CPT | Mod: HCNC

## 2019-09-21 PROCEDURE — 63600175 PHARM REV CODE 636 W HCPCS: Mod: HCNC | Performed by: EMERGENCY MEDICINE

## 2019-09-21 PROCEDURE — 25500020 PHARM REV CODE 255: Mod: HCNC | Performed by: FAMILY MEDICINE

## 2019-09-21 PROCEDURE — 85025 COMPLETE CBC W/AUTO DIFF WBC: CPT | Mod: HCNC

## 2019-09-21 PROCEDURE — 71275 CTA CHEST NON CORONARY: ICD-10-PCS | Mod: 26,HCNC,, | Performed by: RADIOLOGY

## 2019-09-21 PROCEDURE — 71275 CT ANGIOGRAPHY CHEST: CPT | Mod: TC,HCNC

## 2019-09-21 PROCEDURE — 80053 COMPREHEN METABOLIC PANEL: CPT | Mod: HCNC

## 2019-09-21 PROCEDURE — 83880 ASSAY OF NATRIURETIC PEPTIDE: CPT | Mod: HCNC

## 2019-09-21 PROCEDURE — 96361 HYDRATE IV INFUSION ADD-ON: CPT | Mod: HCNC

## 2019-09-21 RX ADMIN — IOHEXOL 75 ML: 350 INJECTION, SOLUTION INTRAVENOUS at 02:09

## 2019-09-21 RX ADMIN — SODIUM CHLORIDE 1000 ML: 0.9 INJECTION, SOLUTION INTRAVENOUS at 01:09

## 2019-09-21 NOTE — ED PROVIDER NOTES
Encounter Date: 9/21/2019    SCRIBE #1 NOTE: I, Veronica Matute, am scribing for, and in the presence of,  Ken Taveras MD. I have scribed the following portions of the note - Other sections scribed: HPI, ROS, PE.       History     Chief Complaint   Patient presents with    sent by pcp     seen by pcp on yesterday for sob, states she was called pcp to come to er to receive a test, but she forgot what test it was; denies chest pain, dizzy     CC: Abnormal Lab Results     HPI: This 73 y.o. Female with a medical hx of former smoking, DM, CKD, HLD, and HTN presents to the ED for evaluation of shortness of breath for over x1 month. She was sent by PCP, Dr. Mendoza, after blood work was done yesterday and revealed elevated d-dimer. Sent to the hospital for CTA chest. It was ordered as an outpatient but is in the ER now. Likely due to no kidney function done yesterday. She states exertion and talking exacerbate shortness of breath. She denies chest pain, cough, fever, chills, rhinorrhea or NVD. No prior tx reported.      The history is provided by the patient. No  was used.     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, without long-term current use of insulin     Depression     Diabetes mellitus, type 2     Eye injury as a child     k-abrasion  od    Hyperlipidemia     Hypertension     Hypothyroidism     Immune deficiency disorder     Immune disorder     Myalgia and myositis 9/6/2012    Osteoporosis     Polyneuropathy     Renal manifestation  of secondary diabetes mellitus     Sarcoidosis     Ulcer     no cancer    Urinary incontinence      Past Surgical History:   Procedure Laterality Date    CARPAL TUNNEL RELEASE      Rt wrist    CATARACT EXTRACTION W/  INTRAOCULAR LENS IMPLANT Right 2015    Dr. Azevedo    CATARACT EXTRACTION W/  INTRAOCULAR LENS IMPLANT Left 2015    Dr. Azevedo     SECTION      CHOLECYSTECTOMY      INJECTION OF JOINT Left 3/21/2019    Procedure: Injection, Joint  fLUOROSCOPIC jOINT iNJECTION (hIP iNJECTION) LEFT ROCH BURSA AS WELL LEFT TROCHANTERIC BURSA;  Surgeon: Alfonso Richards MD;  Location: [facility];  Service: Pain Management;  Laterality: Left;  NEEDS CONSENT, DIABETIC    INJECTION OF JOINT Left 2019    Procedure: Injection, Joint FLUOROSCOPIC JOINT INJECTION (HIP INJECTION) LEFT HIP;  Surgeon: Alfonso Richards MD;  Location: [facility];  Service: Pain Management;  Laterality: Left;  NEEDS CONSENT    INJECTION OF JOINT Left 2019    Procedure: INJECTION, JOINT;  Surgeon: Alfonso Richards MD;  Location: [facility];  Service: Pain Management;  Laterality: Left;  Left Hip and Left GTB Injections    TUBAL LIGATION       Family History   Problem Relation Age of Onset    Hypertension Mother     Cataracts Mother     No Known Problems Father     Hypertension Maternal Grandmother     Glaucoma Sister     Arthritis Sister     No Known Problems Brother     No Known Problems Maternal Aunt     No Known Problems Maternal Uncle     No Known Problems Paternal Aunt     No Known Problems Paternal Uncle     No Known Problems Maternal Grandfather     No Known Problems Paternal Grandmother     No Known Problems Paternal Grandfather     Kidney failure Sister     Hepatitis Sister     Cancer Sister         bone cancer     Immunodeficiency Sister     Diabetes Son     Hypertension Son     Lupus Neg Hx     Rheum arthritis Neg Hx     Amblyopia Neg Hx     Blindness Neg Hx     Macular  degeneration Neg Hx     Retinal detachment Neg Hx     Strabismus Neg Hx     Stroke Neg Hx     Thyroid disease Neg Hx     Endometrial cancer Neg Hx     Vaginal cancer Neg Hx     Cervical cancer Neg Hx      Social History     Tobacco Use    Smoking status: Never Smoker    Smokeless tobacco: Never Used   Substance Use Topics    Alcohol use: No    Drug use: No     Review of Systems   Constitutional: Negative for chills and fever.   HENT: Negative for congestion.    Respiratory: Positive for shortness of breath.    Cardiovascular: Negative for chest pain.   Gastrointestinal: Negative for diarrhea, nausea and vomiting.   Genitourinary: Negative for dysuria.   Musculoskeletal: Negative for back pain and neck pain.   Skin: Negative for rash.   Neurological: Negative for headaches.       Physical Exam     Initial Vitals [09/21/19 1202]   BP Pulse Resp Temp SpO2   120/78 92 20 97.9 °F (36.6 °C) 98 %      MAP       --         Physical Exam    Nursing note and vitals reviewed.  Constitutional: She is not diaphoretic. No distress.   HENT:   Head: Normocephalic and atraumatic.   Mouth/Throat: Oropharynx is clear and moist.   Eyes: EOM are normal. Pupils are equal, round, and reactive to light. No scleral icterus.   Neck: Normal range of motion. Neck supple. No JVD present.   Cardiovascular: Normal rate, regular rhythm and intact distal pulses.   Pulmonary/Chest: Breath sounds normal. No stridor. No respiratory distress.   Abdominal: She exhibits distension. There is no tenderness.   There is no lower extremity edema.    Musculoskeletal: Normal range of motion. She exhibits no edema or tenderness.   Neurological: She is alert and oriented to person, place, and time. She has normal strength. No cranial nerve deficit or sensory deficit.   Skin: Skin is warm and dry.   Psychiatric: She has a normal mood and affect.         ED Course   Procedures  Labs Reviewed   CBC W/ AUTO DIFFERENTIAL - Abnormal; Notable for the  following components:       Result Value    RBC 3.10 (*)     Hemoglobin 10.9 (*)     Hematocrit 33.2 (*)     Mean Corpuscular Volume 107 (*)     Mean Corpuscular Hemoglobin 35.2 (*)     Gran% 73.8 (*)     All other components within normal limits   COMPREHENSIVE METABOLIC PANEL - Abnormal; Notable for the following components:    Glucose 119 (*)     Alkaline Phosphatase 54 (*)     eGFR if  52 (*)     eGFR if non  45 (*)     All other components within normal limits   B-TYPE NATRIURETIC PEPTIDE   TROPONIN I     EKG Readings: (Independently Interpreted)   Initial Reading: No STEMI. Rhythm: Normal Sinus Rhythm. Heart Rate: 87. Ectopy: No Ectopy. Conduction: Normal. ST Segments: Normal ST Segments. T Waves: Normal.     ECG Results          EKG 12-lead (In process)  Result time 09/22/19 10:21:49    In process by Interface, Lab In [facility] (09/22/19 10:21:49)                 Narrative:    Test Reason : R06.02,    Vent. Rate : 087 BPM     Atrial Rate : 087 BPM     P-R Int : 122 ms          QRS Dur : 074 ms      QT Int : 376 ms       P-R-T Axes : 056 025 041 degrees     QTc Int : 452 ms    Normal sinus rhythm  Normal ECG  When compared with ECG of 19-SEP-2019 10:37,  Significant changes have occurred    Referred By: [email or web address]   SELF           Confirmed By:                   In process by Interface, Lab In [facility] (09/22/19 10:18:59)                 Narrative:    Test Reason : R06.02,    Vent. Rate : 087 BPM     Atrial Rate : 087 BPM     P-R Int : 122 ms          QRS Dur : 074 ms      QT Int : 376 ms       P-R-T Axes : 056 025 041 degrees     QTc Int : 452 ms    Normal sinus rhythm  Normal ECG  When compared with ECG of 19-SEP-2019 10:37,  Significant changes have occurred    Referred By: [email or web address]   SELF           Confirmed By:                             Imaging Results    None        No central PE. Timing of the contrast was suboptimal. ALso leads were not taken off the patient's chest.  Regardless. No chest pain, pain with breathing, tachypnea, tachycardia, or hypoxia. Will not vq scan at his time. IF sarcoid affecting lungs vq would be unreliable. No signs or symptoms of dvt. Will discharge back to primary and refer to pulmonary and cardiology for SOB.                        Clinical Impression:       ICD-10-CM ICD-9-CM   1. SOB (shortness of breath) R06.02 786.05     I, Ken Taveras, personally performed the services described in this documentation. All medical record entries made by the scribe were at my direction and in my presence. I have reviewed the chart and agree that the record reflects my personal performance and is accurate and complete.                           Ken Taveras MD  09/25/19 [id number]

## 2019-09-21 NOTE — ED TRIAGE NOTES
"Pt arrived to ED with c/o SOB, states was sent by PCP for CT but does not know why. States, "she said something was elevated but I cant remember what she told me." NAD. Pt reports SOB x 1 month.   "

## 2019-09-23 ENCOUNTER — PATIENT OUTREACH (OUTPATIENT)
Dept: ADMINISTRATIVE | Facility: OTHER | Age: 74
End: 2019-09-23

## 2019-09-23 NOTE — PROGRESS NOTES
Hi Mrs. Lawrence    Your CT scan is negative for a clot. I see you were seen in the ER and evaluated also. I am not sure if this is your sarcoid affecting your lungs. If you don't improve, I can consult a lung doctor. Let me know if you would like me to do this    Micaela Mendoza MD

## 2019-09-24 NOTE — PROGRESS NOTES
Subjective:      Patient ID: Regino Lawrence is a 73 y.o. female.    Chief Complaint: Low-back Pain and Neck Pain    Ms Lawrence is a 72 yo female here for follow up of low back pain and left hip pain.  s/p posterior C3-C7 laminectomy and fusion on 11/16/2015.  She was last seen by me on 6/25/2019 and has had low back for years.  She was sent to pain management for left hip joint injection done on 3/21/2019 and then again 7/21/2019.  She also had left hip and GTB injection 9/12/2019.  She feels like the recent injection was very helpful.  It helped left hip.  The right side started hurting.  She cannot sleep on right side.  She feels like since left feeling better now she cannot sleep on the right.  She is going to to see pain management next week.  She feels like the pain is with walking.  She has not been able to get to PT due to the pain.  She is waiting on the left hip the pain is 8/10    Interventional Therapies:   10/8/18 - left L5 transforaminal epidural steroid injection - good relief of back pain no relief of left lower extremity symptoms  12/6/18 - bilateral L5 transforaminal epidural steroid injection - good relief of back pain no relief of left lower extremity symptoms  3/21/2019 left hip injection    MRI lumbar 9/2018  The distal cord/conus demonstrates normal size and signal.  No evidence of osteomyelitis, marrow replacement process, or acute fracture.  No paraspinal masses.    At L1-2, there is asymmetric disc bulging to the right, resulting in mild right-sided neural foraminal narrowing.  No spinal canal stenosis.    L1-2 is unremarkable.    L2-3 demonstrates mild disc bulging slightly asymmetric to the left resulting in mild left-sided neural foraminal narrowing.  No spinal canal stenosis.    L4-5 demonstrates minimal anterolisthesis anterolisthesis.  There is mild facet arthropathy and disc bulging.  This results in mild left-sided neural foraminal narrowing.  No spinal canal stenosis.    At L5-S1,  there is a moderate sized left lateral recess/foraminal disc extrusion effacing the left neural foramen, likely impinging upon the exiting left L5 nerve root.  There is left-sided degenerative disc disease, noting disc height loss and sub endplate marrow edema.  There is moderate bilateral facet arthropathy.  No spinal canal stenosis.    Impression      Moderate size left foraminal disc extrusion at L5-S1, likely impinging upon the exiting left L5 nerve root.    Additional lumbar spondylosis, resulting in mild neural foraminal narrowing at L1-2, L2-3, and L4-5, as above.  No spinal canal stenosis.    X-ray hip 1/2019  No evidence for acute fracture, dislocation or destructive process.  Marginal osteophytes noted bilaterally, slightly more pronounced on the left.  Joint spaces appear maintained.  Degenerative changes of the lumbar spine are evident.  SI joints and sacrum demonstrate no acute finding.  Moderate amount of stool is seen throughout the colon.  Surrounding soft tissues appear unremarkable.    Ct cervical 7/2018  There is postsurgical change of posterior spinal fusion and laminectomies from C3 to C7 with bilateral lateral mass screws, stabilization rods, and bone material.  No surrounding lucency to suggest loosening.  No hardware fracture or other evidence of hardware failure.    There is straightening of the normal cervical lordosis.  The vertebral body heights appear relatively well-maintained.  There is no evidence of fracture or osseous lytic or blastic process.  There is intervertebral disc space height loss, most significant at C5-C6 and C6-C7. Focal degenerative changes are described below.    C2 -- C3: Posterior disc osteophyte complex and facet arthropathy without significant spinal canal stenosis or neuroforaminal narrowing.    C3 -- C4: Postsurgical change with posterior disc osteophyte complex without significant spinal canal stenosis or neuroforaminal narrowing.    C4 -- C5: Postsurgical  change with posterior disc osteophyte complex, mild uncovertebral spurring, and facet arthropathy resulting in mild left neuroforaminal narrowing.  No significant spinal canal stenosis.    C5 -- C6: Postsurgical change with posterior disc osteophyte complex, facet arthropathy, and uncovertebral spurring resulting in mild right and severe left neuroforaminal narrowing.  No significant spinal canal stenosis.    C6 -- C7: Postsurgical change with posterior disc osteophyte complex, uncovertebral spurring, and facet arthropathy resulting in mild bilateral neuroforaminal narrowing.  No significant spinal canal stenosis.    C7 -- T1: No significant disc abnormality, spinal canal stenosis, or neuroforaminal narrowing.    The spinal canal otherwise appears unremarkable, noting evaluation at postsurgical levels is somewhat limited due to metallic artifact.  No intradural abnormalities are identified.  Evaluation of the surrounding soft tissues reveals unchanged appearance of a small osseous body within the left posterior neck.  A 0.9 x 0.7 cm soft tissue opacity within the posterior left parotid gland.  This focus has slightly enlarged since CT 03/23/2016.  There is biapical pulmonary scarring.    Impression      Postsurgical change of C3-C7 laminectomy with posterior instrumented spinal fusion, unchanged.    Cervical spondylosis, most significant at C5-C6 with severe left and mild right neural foraminal narrowing at this level.  Additional multilevel neural foraminal narrowing as detailed above.  No significant spinal canal stenosis at any level.    0.9 cm left parotid soft tissue opacity, slightly enlarged since CT 03/23/2016 and possibly correlating with a prominent intraparotid lymph node or adenoma.  Further evaluation is recommended with dedicated parotid ultrasound.      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, without   long-term current use of insulin  No date: Depression  No date: Diabetes mellitus, type 2  as a child : Eye injury      Comment:  k-abrasion  od  No date: Hyperlipidemia  No date: Hypertension  No date: Hypothyroidism  No date: Immune deficiency disorder  No date: Immune disorder  2012: Myalgia and myositis  No date: Osteoporosis  No date: Polyneuropathy  No date: Renal manifestation of secondary diabetes mellitus  No date: Sarcoidosis  No date: Ulcer      Comment:  no cancer  No date: Urinary incontinence    Past Surgical History:  No date: CARPAL TUNNEL RELEASE      Comment:  Rt wrist  2015: CATARACT EXTRACTION W/  INTRAOCULAR LENS IMPLANT; Right      Comment:  Dr. Azevedo  2015: CATARACT EXTRACTION W/  INTRAOCULAR LENS IMPLANT; Left      Comment:  Dr. Azevedo  No date:  SECTION  No date: CHOLECYSTECTOMY  2016: COLPOCLEISIS-- [place]; N/A      Comment:  Performed by Meredith Becker DO at [facility] OR  2016: CYSTOSCOPY; N/A      Comment:  Performed by Meredith Becker DO at [facility] OR  2018: Injection,steroid,epidural,transforaminal approach      Bilateral L5; Bilateral      Comment:  Performed by Alfonso Richards MD at [facility] PAIN T  10/8/2018: Injection,steroid,epidural,transforaminal approach  Left   L5-S1; Left      Comment:  Performed by Alfonso Richards MD at [facility] PAIN T  2015: INSERTION-INTRAOCULAR LENS (IOL); Left      Comment:  Performed by Elio Azevedo MD at [facility] OR  2015: INSERTION-INTRAOCULAR LENS (IOL); Right      Comment:  Performed by Elio Azevedo MD at [facility] OR  2015: LAMINECTOMY-CERVICAL/FUSION-POSTERIOR C3-C7; N/A      Comment:  [facility]  2015:  PHACOEMULSIFICATION-ASPIRATION-CATARACT; Left      Comment:  Performed by Elio Azevedo MD at [facility] OR  4/30/2015: PHACOEMULSIFICATION-ASPIRATION-CATARACT; Right      Comment:  Performed by Elio Azevedo MD at [facility] OR  11/28/2016: REPAIR-POSTERIOR; N/A      Comment:  Performed by Meredith Becker DO at [facility] OR  11/28/2016: SLING-TRANSOBTERATOR TAPE; N/A      Comment:  Performed by Meredith Becker DO at [facility] OR  No date: TUBAL LIGATION    Review of patient's family history indicates:  Problem: Hypertension      Relation: Mother          Age of Onset: (Not Specified)  Problem: Cataracts      Relation: Mother          Age of Onset: (Not Specified)  Problem: No Known Problems      Relation: Father          Age of Onset: (Not Specified)  Problem: Hypertension      Relation: Maternal Grandmother          Age of Onset: (Not Specified)  Problem: Glaucoma      Relation: Sister          Age of Onset: (Not Specified)  Problem: Arthritis      Relation: Sister          Age of Onset: (Not Specified)  Problem: No Known Problems      Relation: Brother          Age of Onset: (Not Specified)  Problem: No Known Problems      Relation: Maternal Aunt          Age of Onset: (Not Specified)  Problem: No Known Problems      Relation: Maternal Uncle          Age of Onset: (Not Specified)  Problem: No Known Problems      Relation: Paternal Aunt          Age of Onset: (Not Specified)  Problem: No Known Problems      Relation: Paternal Uncle          Age of Onset: (Not Specified)  Problem: No Known Problems      Relation: Maternal Grandfather          Age of Onset: (Not Specified)  Problem: No Known Problems      Relation: Paternal Grandmother          Age of Onset: (Not Specified)  Problem: No Known Problems      Relation: Paternal Grandfather          Age of Onset: (Not Specified)  Problem: Kidney failure      Relation: Sister          Age of Onset: (Not Specified)  Problem: Hepatitis      Relation: Sister           Age of Onset: (Not Specified)  Problem: Cancer      Relation: Sister          Age of Onset: (Not Specified)          Comment: bone cancer   Problem: Immunodeficiency      Relation: Sister          Age of Onset: (Not Specified)  Problem: Lupus      Relation: Neg Hx          Age of Onset: (Not Specified)  Problem: Rheum arthritis      Relation: Neg Hx          Age of Onset: (Not Specified)  Problem: Amblyopia      Relation: Neg Hx          Age of Onset: (Not Specified)  Problem: Blindness      Relation: Neg Hx          Age of Onset: (Not Specified)  Problem: Diabetes      Relation: Neg Hx          Age of Onset: (Not Specified)  Problem: Macular degeneration      Relation: Neg Hx          Age of Onset: (Not Specified)  Problem: Retinal detachment      Relation: Neg Hx          Age of Onset: (Not Specified)  Problem: Strabismus      Relation: Neg Hx          Age of Onset: (Not Specified)  Problem: Stroke      Relation: Neg Hx          Age of Onset: (Not Specified)  Problem: Thyroid disease      Relation: Neg Hx          Age of Onset: (Not Specified)  Problem: Endometrial cancer      Relation: Neg Hx          Age of Onset: (Not Specified)  Problem: Vaginal cancer      Relation: Neg Hx          Age of Onset: (Not Specified)  Problem: Cervical cancer      Relation: Neg Hx          Age of Onset: (Not Specified)      Social History    Socioeconomic History      Marital status:       Spouse name: Not on file      Number of children: Not on file      Years of education: Not on file      Highest education level: Not on file    Social Needs      Financial resource strain: Not on file      Food insecurity - worry: Not on file      Food insecurity - inability: Not on file      Transportation needs - medical: Not on file      Transportation needs - non-medical: Not on file    Occupational History      Not on file    Tobacco Use      Smoking status: Never Smoker      Smokeless tobacco: Never Used    Substance and Sexual  Activity      Alcohol use: No      Drug use: No      Sexual activity: Yes        Partners: Male    Other Topics      Concerns:        Not on file    Social History Narrative      Not on file      Current Outpatient Medications:  ACCU-CHEK FASTCLIX LANCING DEV Kit, USE AS DIRECTED., Disp: 1 each, Rfl: 0  ACCU-CHEK SMARTVIEW TEST STRIP Strp, TEST  ONCE  A  DAY, Disp: 100 strip, Rfl: 11  ALCOHOL ANTISEPTIC PADS (ALCOHOL PREP PADS TOP), , Disp: , Rfl:   amLODIPine (NORVASC) 10 MG tablet, Take 1 tablet (10 mg total) by mouth once daily., Disp: 90 tablet, Rfl: 1  blood-glucose meter kit, Use as instructed, Disp: 1 each, Rfl: 0  calcium carbonate (OS-MALCOLM) 600 mg calcium (1,500 mg) Tab, Take 1 tablet by mouth 2 (two) times daily. , Disp: , Rfl:   carvedilol (COREG) 25 MG tablet, Take 1 tablet (25 mg total) by mouth 2 (two) times daily., Disp: 180 tablet, Rfl: 3  cloNIDine (CATAPRES) 0.3 MG tablet, Take 1 tablet (0.3 mg total) by mouth 3 (three) times daily., Disp: 270 tablet, Rfl: 1  epoetin german (PROCRIT INJ), Inject 1,000 Units as directed., Disp: , Rfl:   fenofibrate 160 MG Tab, Take 1 tablet (160 mg total) by mouth once daily., Disp: 90 tablet, Rfl: 1  fexofenadine (ALLEGRA ALLERGY) 180 MG tablet, Take 180 mg by mouth daily as needed. , Disp: , Rfl:   folic acid (FOLVITE) 1 MG tablet, Take 1 tablet (1 mg total) by mouth once daily., Disp: 90 tablet, Rfl: 0  gabapentin (NEURONTIN) 400 MG capsule, Take 1 capsule (400 mg total) by mouth 2 (two) times daily., Disp: 180 capsule, Rfl: 1  HYDROcodone-acetaminophen (NORCO) 5-325 mg per tablet, Take 1 tablet by mouth every 6 (six) hours as needed., Disp: 90 tablet, Rfl: 0  levothyroxine (SYNTHROID) 50 MCG tablet, TAKE 1 TABLET BY MOUTH ONCE DAILY BEFORE BREAKFAST, Disp: 90 tablet, Rfl: 1  metFORMIN (GLUCOPHAGE) 1000 MG tablet, Take 1 tablet (1,000 mg total) by mouth 2 (two) times daily with meals., Disp: 180 tablet, Rfl: 1  methotrexate 2.5 MG Tab, TAKE 6 TABLETS BY MOUTH EVERY  7 DAYS, Disp: 72 tablet, Rfl: 0  predniSONE (DELTASONE) 10 MG tablet, , Disp: , Rfl:     No current facility-administered medications for this visit.       Review of patient's allergies indicates:  No Known Allergies          Review of Systems   Constitution: Negative for weight gain and weight loss.   Cardiovascular: Negative for chest pain.   Respiratory: Positive for shortness of breath.    Musculoskeletal: Positive for back pain (left leg) and joint pain (left foot). Negative for joint swelling.   Gastrointestinal: Positive for nausea. Negative for abdominal pain, bowel incontinence and vomiting.   Genitourinary: Negative for bladder incontinence.   Neurological: Negative for numbness and paresthesias.         Objective:        General: Regino is well-developed, well-nourished, appears stated age, in no acute distress, alert and oriented to time, place and person.     General    Vitals reviewed.  Constitutional: She is oriented to person, place, and time. She appears well-developed and well-nourished.   HENT:   Head: Normocephalic and atraumatic.   Pulmonary/Chest: Effort normal.   Neurological: She is alert and oriented to person, place, and time.   Psychiatric: She has a normal mood and affect. Her behavior is normal. Judgment and thought content normal.     General Musculoskeletal Exam   Gait: antalgic (holds left leg in front of her and does not want to put weight or extend hip)     Left Hip Exam     Range of Motion   Extension: -10   External rotation: 40   Internal rotation: 10       Back (L-Spine & T-Spine) / Neck (C-Spine) Exam     Tenderness Right paramedian tenderness of the Sacrum. Left paramedian tenderness of the Sacrum.     Back (L-Spine & T-Spine) Range of Motion   Extension: 0   Flexion: 70   Lateral bend right: 10   Lateral bend left: 10   Rotation right: 30   Rotation left: 30     Spinal Sensation   Right Side Sensation  C-Spine Level: normal   L-Spine Level: normal  S-Spine Level:  normal  Left Side Sensation  C-Spine Level: normal  L-Spine Level: normal  S-Spine Level: normal    Back (L-Spine & T-Spine) Tests   Right Side Tests  Straight leg raise:      Sitting SLR: > 70 degrees      Left Side Tests  Straight leg raise:     Sitting SLR: > 70 degrees          Other She has no scoliosis .  Spinal Kyphosis:  Absent      Muscle Strength   Right Upper Extremity   Biceps: 5/5/5   Deltoid:  5/5  Triceps:  5/5  Wrist extension: 5/5/5   Finger Flexors:  5/5  Left Upper Extremity  Biceps: 5/5/5   Deltoid:  5/5  Triceps:  5/5  Wrist extension: 5/5/5   Finger Flexors:  5/5  Right Lower Extremity   Hip Flexion: 5/5   Quadriceps:  5/5   Anterior tibial:  5/5/5  EHL:  5/5  Left Lower Extremity   Hip Flexion: 5/5   Quadriceps:  5/5   Anterior tibial:  5/5/5   EHL:  5/5    Reflexes     Left Side  Biceps:  2+  Triceps:  2+  Brachioradialis:  2+  Quadriceps:  2+  Achilles:  2+  Left Silverman's Sign:  Absent  Babinski Sign:  absent    Right Side   Biceps:  2+  Triceps:  2+  Brachioradialis:  2+  Quadriceps:  2+  Achilles:  2+  Right Silverman's Sign:  absent  Babinski Sign:  absent    Vascular Exam     Right Pulses        Carotid:                  2+    Left Pulses        Carotid:                  2+              Assessment:       1. Left hip pain    2. Trochanteric bursitis of left hip    3. DDD (degenerative disc disease), lumbar    4. Right hip pain           Plan:       Orders Placed This Encounter    Ambulatory Referral to Physical/Occupational Therapy     1.   Left hip joint injection on 3/21 was helpful for over a month.  She got 100% worth of relief.  She does have some degenerative changes in her hip  2.  She has back pain and DDD and she has had injections in her back which help back pain but not the left leg pain.  The injection in her hip helped the left leg pain.  I think the leg pain is coming from hip.  We discussed going back to ortho since the hip joint injection helps  3.  Repeat left hip joint  injection with pain management was helpful, she feels like everything is helpful  4.  Appointment with Dr. Richards to consider other injections  5. PT for back and core strengthening, ITB stretches and glute strengthening and HEP  6.  RTC 3 months          Follow-up: No follow-ups on file. If there are any questions prior to this, the patient was instructed to contact the office.

## 2019-09-25 ENCOUNTER — TELEPHONE (OUTPATIENT)
Dept: FAMILY MEDICINE | Facility: CLINIC | Age: 74
End: 2019-09-25

## 2019-09-25 ENCOUNTER — PATIENT MESSAGE (OUTPATIENT)
Dept: FAMILY MEDICINE | Facility: CLINIC | Age: 74
End: 2019-09-25

## 2019-09-25 ENCOUNTER — OFFICE VISIT (OUTPATIENT)
Dept: SPINE | Facility: CLINIC | Age: 74
End: 2019-09-25
Attending: PHYSICAL MEDICINE & REHABILITATION
Payer: MEDICARE

## 2019-09-25 VITALS
BODY MASS INDEX: 21.62 KG/M2 | HEIGHT: 63 IN | SYSTOLIC BLOOD PRESSURE: 133 MMHG | DIASTOLIC BLOOD PRESSURE: 88 MMHG | HEART RATE: 84 BPM | TEMPERATURE: 98 F | WEIGHT: 122 LBS

## 2019-09-25 DIAGNOSIS — M51.36 DEGENERATIVE DISC DISEASE, LUMBAR: ICD-10-CM

## 2019-09-25 DIAGNOSIS — M51.36 DDD (DEGENERATIVE DISC DISEASE), LUMBAR: ICD-10-CM

## 2019-09-25 DIAGNOSIS — M25.552 LEFT HIP PAIN: Primary | ICD-10-CM

## 2019-09-25 DIAGNOSIS — R06.02 SHORTNESS OF BREATH: Primary | ICD-10-CM

## 2019-09-25 DIAGNOSIS — M25.551 RIGHT HIP PAIN: ICD-10-CM

## 2019-09-25 DIAGNOSIS — M70.62 TROCHANTERIC BURSITIS OF LEFT HIP: ICD-10-CM

## 2019-09-25 PROCEDURE — 1101F PR PT FALLS ASSESS DOC 0-1 FALLS W/OUT INJ PAST YR: ICD-10-PCS | Mod: HCNC,CPTII,S$GLB, | Performed by: PHYSICAL MEDICINE & REHABILITATION

## 2019-09-25 PROCEDURE — 3075F PR MOST RECENT SYSTOLIC BLOOD PRESS GE 130-139MM HG: ICD-10-PCS | Mod: HCNC,CPTII,S$GLB, | Performed by: PHYSICAL MEDICINE & REHABILITATION

## 2019-09-25 PROCEDURE — 3075F SYST BP GE 130 - 139MM HG: CPT | Mod: HCNC,CPTII,S$GLB, | Performed by: PHYSICAL MEDICINE & REHABILITATION

## 2019-09-25 PROCEDURE — 99214 PR OFFICE/OUTPT VISIT, EST, LEVL IV, 30-39 MIN: ICD-10-PCS | Mod: HCNC,S$GLB,, | Performed by: PHYSICAL MEDICINE & REHABILITATION

## 2019-09-25 PROCEDURE — 3079F PR MOST RECENT DIASTOLIC BLOOD PRESSURE 80-89 MM HG: ICD-10-PCS | Mod: HCNC,CPTII,S$GLB, | Performed by: PHYSICAL MEDICINE & REHABILITATION

## 2019-09-25 PROCEDURE — 99214 OFFICE O/P EST MOD 30 MIN: CPT | Mod: HCNC,S$GLB,, | Performed by: PHYSICAL MEDICINE & REHABILITATION

## 2019-09-25 PROCEDURE — 3079F DIAST BP 80-89 MM HG: CPT | Mod: HCNC,CPTII,S$GLB, | Performed by: PHYSICAL MEDICINE & REHABILITATION

## 2019-09-25 PROCEDURE — 99999 PR PBB SHADOW E&M-EST. PATIENT-LVL III: ICD-10-PCS | Mod: PBBFAC,HCNC,, | Performed by: PHYSICAL MEDICINE & REHABILITATION

## 2019-09-25 PROCEDURE — 99999 PR PBB SHADOW E&M-EST. PATIENT-LVL III: CPT | Mod: PBBFAC,HCNC,, | Performed by: PHYSICAL MEDICINE & REHABILITATION

## 2019-09-25 PROCEDURE — 1101F PT FALLS ASSESS-DOCD LE1/YR: CPT | Mod: HCNC,CPTII,S$GLB, | Performed by: PHYSICAL MEDICINE & REHABILITATION

## 2019-09-25 RX ORDER — HYDROCODONE BITARTRATE AND ACETAMINOPHEN 5; 325 MG/1; MG/1
1 TABLET ORAL EVERY 6 HOURS PRN
Qty: 90 TABLET | Refills: 0 | Status: SHIPPED | OUTPATIENT
Start: 2019-09-25 | End: 2019-10-25

## 2019-09-25 NOTE — TELEPHONE ENCOUNTER
----- Message from Licha Kerr sent at 9/25/2019  2:42 PM CDT -----  Contact: pt  Type: Patient Call Back    Who called:pt    What is the request in detail:pt is requesting a referral for cardio. Call pt    Can the clinic reply by [facility]?    Would the patient rather a call back or a response via [facility]? Call back    Best call back number:[phone] or [phone]    Additional Information:

## 2019-09-25 NOTE — TELEPHONE ENCOUNTER
Spoke with the pt and she did she .  Patient states she was told to see another cardiologist,  Dr. Corbin.  She states the Ed doctor also wants her to see pulmonary,   Patient states she is still short of breath.  Please advise  Ed doctor advise the patient to be seen by both of these providers, as soon as possible.  Patient states she told the Ed doctor that she already was seen  and he still refer her to see .

## 2019-09-30 ENCOUNTER — TELEPHONE (OUTPATIENT)
Dept: PAIN MEDICINE | Facility: CLINIC | Age: 74
End: 2019-09-30

## 2019-09-30 NOTE — TELEPHONE ENCOUNTER
My name is Staff, I am contacting you from [facility] pain management regarding your appointment scheduled for 10.01.19, with Provider Dr. Richards, just confirming you will be able to make it.    If you feel you need to reschedule or canceled please give our office a call so we can better assist you.      Staff requesting patient to arrive 15 mins ahead of schedule appointment time.    Staff left a voicemail reminding pt of their appt

## 2019-10-01 ENCOUNTER — OFFICE VISIT (OUTPATIENT)
Dept: PAIN MEDICINE | Facility: CLINIC | Age: 74
End: 2019-10-01
Attending: ANESTHESIOLOGY
Payer: MEDICARE

## 2019-10-01 VITALS
DIASTOLIC BLOOD PRESSURE: 73 MMHG | HEIGHT: 63 IN | OXYGEN SATURATION: 100 % | SYSTOLIC BLOOD PRESSURE: 123 MMHG | WEIGHT: 123 LBS | BODY MASS INDEX: 21.79 KG/M2 | HEART RATE: 86 BPM | RESPIRATION RATE: 18 BRPM | TEMPERATURE: 97 F

## 2019-10-01 DIAGNOSIS — M51.36 DDD (DEGENERATIVE DISC DISEASE), LUMBAR: ICD-10-CM

## 2019-10-01 DIAGNOSIS — G89.4 CHRONIC PAIN SYNDROME: ICD-10-CM

## 2019-10-01 DIAGNOSIS — M54.16 LUMBAR RADICULOPATHY: Primary | ICD-10-CM

## 2019-10-01 PROCEDURE — 99999 PR PBB SHADOW E&M-EST. PATIENT-LVL III: CPT | Mod: PBBFAC,HCNC,, | Performed by: ANESTHESIOLOGY

## 2019-10-01 PROCEDURE — 1101F PT FALLS ASSESS-DOCD LE1/YR: CPT | Mod: HCNC,CPTII,S$GLB, | Performed by: ANESTHESIOLOGY

## 2019-10-01 PROCEDURE — 99214 OFFICE O/P EST MOD 30 MIN: CPT | Mod: HCNC,S$GLB,, | Performed by: ANESTHESIOLOGY

## 2019-10-01 PROCEDURE — 1101F PR PT FALLS ASSESS DOC 0-1 FALLS W/OUT INJ PAST YR: ICD-10-PCS | Mod: HCNC,CPTII,S$GLB, | Performed by: ANESTHESIOLOGY

## 2019-10-01 PROCEDURE — 99214 PR OFFICE/OUTPT VISIT, EST, LEVL IV, 30-39 MIN: ICD-10-PCS | Mod: HCNC,S$GLB,, | Performed by: ANESTHESIOLOGY

## 2019-10-01 PROCEDURE — 3074F SYST BP LT 130 MM HG: CPT | Mod: HCNC,CPTII,S$GLB, | Performed by: ANESTHESIOLOGY

## 2019-10-01 PROCEDURE — 3074F PR MOST RECENT SYSTOLIC BLOOD PRESSURE < 130 MM HG: ICD-10-PCS | Mod: HCNC,CPTII,S$GLB, | Performed by: ANESTHESIOLOGY

## 2019-10-01 PROCEDURE — 3078F DIAST BP <80 MM HG: CPT | Mod: HCNC,CPTII,S$GLB, | Performed by: ANESTHESIOLOGY

## 2019-10-01 PROCEDURE — 99999 PR PBB SHADOW E&M-EST. PATIENT-LVL III: ICD-10-PCS | Mod: PBBFAC,HCNC,, | Performed by: ANESTHESIOLOGY

## 2019-10-01 PROCEDURE — 3078F PR MOST RECENT DIASTOLIC BLOOD PRESSURE < 80 MM HG: ICD-10-PCS | Mod: HCNC,CPTII,S$GLB, | Performed by: ANESTHESIOLOGY

## 2019-10-07 ENCOUNTER — OFFICE VISIT (OUTPATIENT)
Dept: URGENT CARE | Facility: CLINIC | Age: 74
End: 2019-10-07
Payer: MEDICARE

## 2019-10-07 VITALS
DIASTOLIC BLOOD PRESSURE: 80 MMHG | OXYGEN SATURATION: 95 % | TEMPERATURE: 98 F | SYSTOLIC BLOOD PRESSURE: 141 MMHG | HEIGHT: 63 IN | BODY MASS INDEX: 21.79 KG/M2 | WEIGHT: 123 LBS | RESPIRATION RATE: 18 BRPM | HEART RATE: 93 BPM

## 2019-10-07 DIAGNOSIS — K12.30 ORAL MUCOSITIS: Primary | ICD-10-CM

## 2019-10-07 DIAGNOSIS — J02.9 SORE THROAT: ICD-10-CM

## 2019-10-07 PROCEDURE — 3079F PR MOST RECENT DIASTOLIC BLOOD PRESSURE 80-89 MM HG: ICD-10-PCS | Mod: CPTII,S$GLB,, | Performed by: NURSE PRACTITIONER

## 2019-10-07 PROCEDURE — 3077F PR MOST RECENT SYSTOLIC BLOOD PRESSURE >= 140 MM HG: ICD-10-PCS | Mod: CPTII,S$GLB,, | Performed by: NURSE PRACTITIONER

## 2019-10-07 PROCEDURE — 1101F PR PT FALLS ASSESS DOC 0-1 FALLS W/OUT INJ PAST YR: ICD-10-PCS | Mod: CPTII,S$GLB,, | Performed by: NURSE PRACTITIONER

## 2019-10-07 PROCEDURE — 1101F PT FALLS ASSESS-DOCD LE1/YR: CPT | Mod: CPTII,S$GLB,, | Performed by: NURSE PRACTITIONER

## 2019-10-07 PROCEDURE — 99214 OFFICE O/P EST MOD 30 MIN: CPT | Mod: S$GLB,,, | Performed by: NURSE PRACTITIONER

## 2019-10-07 PROCEDURE — 3077F SYST BP >= 140 MM HG: CPT | Mod: CPTII,S$GLB,, | Performed by: NURSE PRACTITIONER

## 2019-10-07 PROCEDURE — 99214 PR OFFICE/OUTPT VISIT, EST, LEVL IV, 30-39 MIN: ICD-10-PCS | Mod: S$GLB,,, | Performed by: NURSE PRACTITIONER

## 2019-10-07 PROCEDURE — 3079F DIAST BP 80-89 MM HG: CPT | Mod: CPTII,S$GLB,, | Performed by: NURSE PRACTITIONER

## 2019-10-07 NOTE — PROGRESS NOTES
"Subjective:       Patient ID: Regino Lawrence is a 73 y.o. female.    Vitals:  height is 5' 3" (1.6 m) and weight is 55.8 kg (123 lb). Her temperature is 97.9 °F (36.6 °C). Her blood pressure is 141/80 (abnormal) and her pulse is 93. Her respiration is 18 and oxygen saturation is 95%.     Chief Complaint: Sore Throat    73 year old female reports sore throat and oral "sores" to her mouth and lip for one week. Denies any difficulty breathing or swallowing. No tongue swelling.     Sore Throat    This is a new problem. Episode onset: 7 days. The problem has been gradually worsening. The pain is worse on the right side. The pain is at a severity of 5/10. The pain is moderate. Associated symptoms include trouble swallowing. Pertinent negatives include no congestion, coughing, ear pain, shortness of breath, stridor or vomiting. Treatments tried: mouth wash. The treatment provided no relief.       Constitution: Negative for chills, sweating, fatigue and fever.   HENT: Positive for sore throat and trouble swallowing. Negative for ear pain, congestion, sinus pain, sinus pressure and voice change.    Neck: Negative for painful lymph nodes.   Eyes: Negative for eye redness.   Respiratory: Negative for chest tightness, cough, sputum production, bloody sputum, COPD, shortness of breath, stridor, wheezing and asthma.    Gastrointestinal: Negative for nausea and vomiting.   Musculoskeletal: Negative for muscle ache.   Skin: Negative for rash.   Allergic/Immunologic: Negative for seasonal allergies and asthma.   Hematologic/Lymphatic: Negative for swollen lymph nodes.       Objective:      Physical Exam   Constitutional: She is oriented to person, place, and time. She appears well-developed and well-nourished. She is cooperative.   HENT:   Head: Normocephalic.   Right Ear: Hearing, tympanic membrane, external ear and ear canal normal.   Left Ear: Hearing, tympanic membrane, external ear and ear canal normal.   Nose: Nose normal. " Right sinus exhibits no maxillary sinus tenderness and no frontal sinus tenderness. Left sinus exhibits no maxillary sinus tenderness and no frontal sinus tenderness.   Mouth/Throat: Uvula is midline. Oral lesions present. Posterior oropharyngeal erythema present. No tonsillar exudate.   Skin colored oral lesions to inside of bottom lip. Burns only with eating   Eyes: Pupils are equal, round, and reactive to light. Conjunctivae and EOM are normal.   Cardiovascular: Normal rate, regular rhythm and normal heart sounds.   Pulmonary/Chest: Effort normal and breath sounds normal. No stridor. No respiratory distress. She has no wheezes. She has no rales. She exhibits no tenderness.   Musculoskeletal: Normal range of motion.   Neurological: She is alert and oriented to person, place, and time.   Skin: Skin is warm. Capillary refill takes less than 2 seconds.   Nursing note and vitals reviewed.        Assessment:       1. Oral mucositis    2. Sore throat        Plan:         Oral mucositis  -     diphenhydrAMINE-aluminum-magnesium hydroxide-simethicone-lidocaine HCl 2%; Swish and spit 15 mLs 2 (two) times daily. for 5 days  Dispense: 150 mL; Refill: 0    Sore throat  -     POCT rapid strep A          Patient Instructions   General Discharge Instructions   If you were prescribed a narcotic or controlled medication, do not drive or operate heavy equipment or machinery while taking these medications.  If you were prescribed antibiotics, please take them to completion.  You must understand that you've received an Urgent Care treatment only and that you may be released before all your medical problems are known or treated. You, the patient, will arrange for follow up care as instructed.  Follow up with your PCP or specialty clinic as directed in the next 1-2 weeks if not improved or as needed.  You can call [phone] to schedule an appointment with the appropriate provider.  If your condition worsens we recommend that you  receive another evaluation at the emergency room immediately or contact your primary medical clinics after hours call service to discuss your concerns.  Please return here or go to the Emergency Department for any concerns or worsening of condition.  Stomatitis (Child)  Stomatitis is pain inside the mouth. It can involve open sores (canker sores) or redness and swelling. It occurs on the inside of the cheeks or on the tongue or gums. Stomatitis is more common in children, but it can occur at any age.  Causes  There are many causes of stomatitis, but the most common is viral infections. Other common causes are:  · Injury or irritation of the mouth lining  · Fungal or bacterial infections  · Using tobacco  · Irritating foods or chemicals, such as citrus fruit, toothpaste, or mouthwash  · Lack of certain vitamins, including vitamins B and C  · A weakened immune system  Symptoms  Stomatitis can result in a variety of symptoms, including:  · Redness inside the mouth  · Sores (ulcers) in the mouth  · Pain or burning  · Swelling  · Fever  Treatment  For a viral infection, usually only the symptoms are treated. Antibiotics do not kill viruses and are not recommended for this condition. This infection should go away within 7 to 10 days.  Home care  · Use a local numbing solution for pain relief. Ask the pharmacist for suggestions on which brand and strength is best for your child. You may apply this directly to the sores with a cotton swab or with your finger. Use the numbing solution just before meals if eating is a problem.  · Older children may rinse their mouth with warm saltwater (½ teaspoon of salt in 1 glass of warm water). Be certain they spit the rinse out and do not swallow it.  · Feed your child a soft diet, along with plenty of fluids to prevent dehydration. If your child doesn't want to eat solid foods, it's OK for a few days, as long as he or she drinks lots of fluids. Cool drinks and frozen treats (shercheryt)  are soothing. Avoid citrus juices (orange juice, lemonade, etc.) and salty or spicy foods, since these may cause more pain in the mouth.  · Follow your healthcare provider's instructions on the use of over-the-counter pain medications such as acetaminophen for fever, fussiness, or pain. In infants older than 6 months, you may use children's ibuprofen. (Note: If your child has chronic liver or kidney disease or has ever had a stomach ulcer or gastrointestinal bleeding, talk with your healthcare provider before using these medicines.) Aspirin should never be given to anyone younger than 18 years of age who is ill with a viral infection or fever. It may cause severe liver or brain damage.  · Children should stay home until their fever is gone and they are eating and drinking well.  Follow-up care  Follow up with your childs healthcare provider, or as advised.  · If a culture was done, you will be notified if the treatment needs to be changed. You can call as directed for the results.  Call 911, or get immediate medical care  Contact emergency services right away if any of these occur:  · Trouble breathing  · Inability to swallow  · Extremes drowsiness or trouble awakening  · Fainting or loss of consciousness  · Rapid heart rate  · Seizure  · Stiff neck  When to seek medical advice  For a usually healthy child, call your child's healthcare provider right away if any of these occur:  · Your child is 3 months old or younger and has a fever of 100.4°F (38°C) or higher. Get medical care right away. Fever in a young baby can be a sign of a dangerous infection.  · Your child is of any age and has repeated fevers above 104°F (40°C).  · Your child is younger than 2 years of age and a fever of 100.4°F (38°C) continues for more than 1 day.  · Your child is 2 years old or older and a fever of 100.4°F (38°C) continues for more than 3 days.  · Your child is unable to eat or drink due to mouth pain.  · Your child shows unusual  fussiness, drowsiness or confusion  · Your child shows symptoms of dehydration, including no wet diapers for 8 hours, no tears when crying, sunken eyes, or a dry mouth  Date Last Reviewed: 7/30/2015  © 4334-9990 [employer]. [address] [place] [address]. All rights reserved. This information is not intended as a substitute for professional medical care. Always follow your healthcare professional's instructions.

## 2019-10-07 NOTE — PATIENT INSTRUCTIONS
General Discharge Instructions   If you were prescribed a narcotic or controlled medication, do not drive or operate heavy equipment or machinery while taking these medications.  If you were prescribed antibiotics, please take them to completion.  You must understand that you've received an Urgent Care treatment only and that you may be released before all your medical problems are known or treated. You, the patient, will arrange for follow up care as instructed.  Follow up with your PCP or specialty clinic as directed in the next 1-2 weeks if not improved or as needed.  You can call [phone] to schedule an appointment with the appropriate provider.  If your condition worsens we recommend that you receive another evaluation at the emergency room immediately or contact your primary medical clinics after hours call service to discuss your concerns.  Please return here or go to the Emergency Department for any concerns or worsening of condition.  Stomatitis (Child)  Stomatitis is pain inside the mouth. It can involve open sores (canker sores) or redness and swelling. It occurs on the inside of the cheeks or on the tongue or gums. Stomatitis is more common in children, but it can occur at any age.  Causes  There are many causes of stomatitis, but the most common is viral infections. Other common causes are:  · Injury or irritation of the mouth lining  · Fungal or bacterial infections  · Using tobacco  · Irritating foods or chemicals, such as citrus fruit, toothpaste, or mouthwash  · Lack of certain vitamins, including vitamins B and C  · A weakened immune system  Symptoms  Stomatitis can result in a variety of symptoms, including:  · Redness inside the mouth  · Sores (ulcers) in the mouth  · Pain or burning  · Swelling  · Fever  Treatment  For a viral infection, usually only the symptoms are treated. Antibiotics do not kill viruses and are not recommended for this condition. This infection should go away within 7  to 10 days.  Home care  · Use a local numbing solution for pain relief. Ask the pharmacist for suggestions on which brand and strength is best for your child. You may apply this directly to the sores with a cotton swab or with your finger. Use the numbing solution just before meals if eating is a problem.  · Older children may rinse their mouth with warm saltwater (½ teaspoon of salt in 1 glass of warm water). Be certain they spit the rinse out and do not swallow it.  · Feed your child a soft diet, along with plenty of fluids to prevent dehydration. If your child doesn't want to eat solid foods, it's OK for a few days, as long as he or she drinks lots of fluids. Cool drinks and frozen treats (sherbet) are soothing. Avoid citrus juices (orange juice, lemonade, etc.) and salty or spicy foods, since these may cause more pain in the mouth.  · Follow your healthcare provider's instructions on the use of over-the-counter pain medications such as acetaminophen for fever, fussiness, or pain. In infants older than 6 months, you may use children's ibuprofen. (Note: If your child has chronic liver or kidney disease or has ever had a stomach ulcer or gastrointestinal bleeding, talk with your healthcare provider before using these medicines.) Aspirin should never be given to anyone younger than 18 years of age who is ill with a viral infection or fever. It may cause severe liver or brain damage.  · Children should stay home until their fever is gone and they are eating and drinking well.  Follow-up care  Follow up with your childs healthcare provider, or as advised.  · If a culture was done, you will be notified if the treatment needs to be changed. You can call as directed for the results.  Call 911, or get immediate medical care  Contact emergency services right away if any of these occur:  · Trouble breathing  · Inability to swallow  · Extremes drowsiness or trouble awakening  · Fainting or loss of consciousness  · Rapid  heart rate  · Seizure  · Stiff neck  When to seek medical advice  For a usually healthy child, call your child's healthcare provider right away if any of these occur:  · Your child is 3 months old or younger and has a fever of 100.4°F (38°C) or higher. Get medical care right away. Fever in a young baby can be a sign of a dangerous infection.  · Your child is of any age and has repeated fevers above 104°F (40°C).  · Your child is younger than 2 years of age and a fever of 100.4°F (38°C) continues for more than 1 day.  · Your child is 2 years old or older and a fever of 100.4°F (38°C) continues for more than 3 days.  · Your child is unable to eat or drink due to mouth pain.  · Your child shows unusual fussiness, drowsiness or confusion  · Your child shows symptoms of dehydration, including no wet diapers for 8 hours, no tears when crying, sunken eyes, or a dry mouth  Date Last Reviewed: 7/30/2015  © 5032-3727 [employer]. [address] [place] [address], [place] [address]. All rights reserved. This information is not intended as a substitute for professional medical care. Always follow your healthcare professional's instructions.

## 2019-10-11 ENCOUNTER — INFUSION (OUTPATIENT)
Dept: INFUSION THERAPY | Facility: HOSPITAL | Age: 74
End: 2019-10-11
Attending: INTERNAL MEDICINE
Payer: MEDICARE

## 2019-10-11 ENCOUNTER — LAB VISIT (OUTPATIENT)
Dept: LAB | Facility: HOSPITAL | Age: 74
End: 2019-10-11
Attending: INTERNAL MEDICINE
Payer: MEDICARE

## 2019-10-11 VITALS
TEMPERATURE: 98 F | OXYGEN SATURATION: 97 % | RESPIRATION RATE: 17 BRPM | HEART RATE: 87 BPM | SYSTOLIC BLOOD PRESSURE: 142 MMHG | DIASTOLIC BLOOD PRESSURE: 72 MMHG

## 2019-10-11 DIAGNOSIS — D63.1 ANEMIA IN CHRONIC KIDNEY DISEASE, UNSPECIFIED CKD STAGE: Primary | ICD-10-CM

## 2019-10-11 DIAGNOSIS — D63.1 ANEMIA IN CHRONIC KIDNEY DISEASE, UNSPECIFIED CKD STAGE: ICD-10-CM

## 2019-10-11 DIAGNOSIS — D50.9 IRON DEFICIENCY ANEMIA, UNSPECIFIED IRON DEFICIENCY ANEMIA TYPE: ICD-10-CM

## 2019-10-11 DIAGNOSIS — N18.9 ANEMIA IN CHRONIC KIDNEY DISEASE, UNSPECIFIED CKD STAGE: Primary | ICD-10-CM

## 2019-10-11 DIAGNOSIS — N18.9 ANEMIA IN CHRONIC KIDNEY DISEASE, UNSPECIFIED CKD STAGE: ICD-10-CM

## 2019-10-11 DIAGNOSIS — Z53.1 REFUSAL OF BLOOD TRANSFUSIONS AS PATIENT IS JEHOVAH'S WITNESS: ICD-10-CM

## 2019-10-11 LAB
BASOPHILS # BLD AUTO: 0.01 K/UL (ref 0–0.2)
BASOPHILS NFR BLD: 0.2 % (ref 0–1.9)
DIFFERENTIAL METHOD: ABNORMAL
EOSINOPHIL # BLD AUTO: 0 K/UL (ref 0–0.5)
EOSINOPHIL NFR BLD: 0.2 % (ref 0–8)
ERYTHROCYTE [DISTWIDTH] IN BLOOD BY AUTOMATED COUNT: 13.4 % (ref 11.5–14.5)
HCT VFR BLD AUTO: 32 % (ref 37–48.5)
HGB BLD-MCNC: 10.3 G/DL (ref 12–16)
IMM GRANULOCYTES # BLD AUTO: 0.05 K/UL (ref 0–0.04)
IMM GRANULOCYTES NFR BLD AUTO: 0.9 % (ref 0–0.5)
LYMPHOCYTES # BLD AUTO: 0.8 K/UL (ref 1–4.8)
LYMPHOCYTES NFR BLD: 15.6 % (ref 18–48)
MCH RBC QN AUTO: 35.2 PG (ref 27–31)
MCHC RBC AUTO-ENTMCNC: 32.2 G/DL (ref 32–36)
MCV RBC AUTO: 109 FL (ref 82–98)
MONOCYTES # BLD AUTO: 0.2 K/UL (ref 0.3–1)
MONOCYTES NFR BLD: 3.9 % (ref 4–15)
NEUTROPHILS # BLD AUTO: 4.3 K/UL (ref 1.8–7.7)
NEUTROPHILS NFR BLD: 79.2 % (ref 38–73)
NRBC BLD-RTO: 0 /100 WBC
PLATELET # BLD AUTO: 259 K/UL (ref 150–350)
PMV BLD AUTO: 8.9 FL (ref 9.2–12.9)
RBC # BLD AUTO: 2.93 M/UL (ref 4–5.4)
WBC # BLD AUTO: 5.39 K/UL (ref 3.9–12.7)

## 2019-10-11 PROCEDURE — 36415 COLL VENOUS BLD VENIPUNCTURE: CPT | Mod: HCNC

## 2019-10-11 PROCEDURE — 96372 THER/PROPH/DIAG INJ SC/IM: CPT | Mod: HCNC

## 2019-10-11 PROCEDURE — 63600175 PHARM REV CODE 636 W HCPCS: Mod: HCNC | Performed by: INTERNAL MEDICINE

## 2019-10-11 PROCEDURE — 85025 COMPLETE CBC W/AUTO DIFF WBC: CPT | Mod: HCNC

## 2019-10-11 RX ADMIN — EPOETIN ALFA-EPBX 20000 UNITS: 10000 INJECTION, SOLUTION INTRAVENOUS; SUBCUTANEOUS at 11:10

## 2019-10-11 NOTE — PLAN OF CARE
Labs reviewed. Hgb 10.3 today. Patient received Retacrit injection. Tolerated well. VSS. Patient has noticed increased fatigue since receiving last Retacrit injection. No other complaints at this time. Patient received discharge instructions and follow up appointments. Verbalized understanding and discharged from unit using mobile chair accompanied by daughter. Patient in NAD at time of discharge.

## 2019-10-14 DIAGNOSIS — D86.9 SARCOIDOSIS: Chronic | ICD-10-CM

## 2019-10-14 DIAGNOSIS — G72.9 MYOPATHY: ICD-10-CM

## 2019-10-14 RX ORDER — METHOTREXATE 2.5 MG/1
TABLET ORAL
Qty: 24 TABLET | Refills: 1 | Status: SHIPPED | OUTPATIENT
Start: 2019-10-14 | End: 2019-12-16 | Stop reason: SDUPTHER

## 2019-10-16 ENCOUNTER — OFFICE VISIT (OUTPATIENT)
Dept: UROGYNECOLOGY | Facility: CLINIC | Age: 74
End: 2019-10-16
Payer: MEDICARE

## 2019-10-16 VITALS
WEIGHT: 127.88 LBS | SYSTOLIC BLOOD PRESSURE: 140 MMHG | BODY MASS INDEX: 22.66 KG/M2 | DIASTOLIC BLOOD PRESSURE: 80 MMHG | HEIGHT: 63 IN

## 2019-10-16 DIAGNOSIS — N39.41 URGE INCONTINENCE: Primary | ICD-10-CM

## 2019-10-16 PROCEDURE — 99214 OFFICE O/P EST MOD 30 MIN: CPT | Mod: HCNC,S$GLB,, | Performed by: OBSTETRICS & GYNECOLOGY

## 2019-10-16 PROCEDURE — 1101F PT FALLS ASSESS-DOCD LE1/YR: CPT | Mod: HCNC,CPTII,S$GLB, | Performed by: OBSTETRICS & GYNECOLOGY

## 2019-10-16 PROCEDURE — 99999 PR PBB SHADOW E&M-EST. PATIENT-LVL IV: ICD-10-PCS | Mod: PBBFAC,HCNC,, | Performed by: OBSTETRICS & GYNECOLOGY

## 2019-10-16 PROCEDURE — 1101F PR PT FALLS ASSESS DOC 0-1 FALLS W/OUT INJ PAST YR: ICD-10-PCS | Mod: HCNC,CPTII,S$GLB, | Performed by: OBSTETRICS & GYNECOLOGY

## 2019-10-16 PROCEDURE — 3077F PR MOST RECENT SYSTOLIC BLOOD PRESSURE >= 140 MM HG: ICD-10-PCS | Mod: HCNC,CPTII,S$GLB, | Performed by: OBSTETRICS & GYNECOLOGY

## 2019-10-16 PROCEDURE — 99999 PR PBB SHADOW E&M-EST. PATIENT-LVL IV: CPT | Mod: PBBFAC,HCNC,, | Performed by: OBSTETRICS & GYNECOLOGY

## 2019-10-16 PROCEDURE — 3079F PR MOST RECENT DIASTOLIC BLOOD PRESSURE 80-89 MM HG: ICD-10-PCS | Mod: HCNC,CPTII,S$GLB, | Performed by: OBSTETRICS & GYNECOLOGY

## 2019-10-16 PROCEDURE — 3079F DIAST BP 80-89 MM HG: CPT | Mod: HCNC,CPTII,S$GLB, | Performed by: OBSTETRICS & GYNECOLOGY

## 2019-10-16 PROCEDURE — 99214 PR OFFICE/OUTPT VISIT, EST, LEVL IV, 30-39 MIN: ICD-10-PCS | Mod: HCNC,S$GLB,, | Performed by: OBSTETRICS & GYNECOLOGY

## 2019-10-16 PROCEDURE — 3077F SYST BP >= 140 MM HG: CPT | Mod: HCNC,CPTII,S$GLB, | Performed by: OBSTETRICS & GYNECOLOGY

## 2019-10-16 RX ORDER — OXYBUTYNIN CHLORIDE 5 MG/1
5 TABLET, EXTENDED RELEASE ORAL DAILY
Qty: 30 TABLET | Refills: 11 | Status: SHIPPED | OUTPATIENT
Start: 2019-10-16 | End: 2021-01-14

## 2019-10-16 NOTE — PROGRESS NOTES
[facility]   [address] [address]    Regino Lawrence  [id number]  1945      Regino Lawrence is a 73 y.o.  here for follow up doing well, no complaints of incontinence or prolapse.      PROCEDURE DATE: 11/28/2016     PROCEDURE: [place]'s Colpocleisis, Perineorrhaphy and Levatorplasty , Trans-oburator sling, Cystoscopy      Interval  HP since the last visit   1)  UI:  (+) GISSELLE thinks she has some leakage with sneeze   (+) UUI daytime < night time   -  (+) pads: at least 1 at night, not sure about the daytime .  Daytime frequency: Q 3 -5 hours.  Nocturia: yes: 5-6    (--) dysuria-  (--) hematuria,  (--) frequent UTIs.  (+) complete bladder emptying.     2)  POP:     Symptoms:(--)   (--) vaginal bleeding. (--) vaginal discharge. (-) sexually active.  (--) dyspareunia.   (--)  Vaginal dryness.  (--) vaginal estrogen use.    3)  BM:  (-) constipation/straining.  (+) chronic diarrhea. (--) hematochezia.  (--) fecal incontinence.  (+) fecal smearing/urgency.  (--) incomplete evacuation.        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, without long-term current use of insulin     Depression     Diabetes mellitus, type 2     Eye injury as a child     k-abrasion  od    Hyperlipidemia     Hypertension     Hypothyroidism     Immune deficiency disorder     Immune disorder     Myalgia and myositis 9/6/2012    Osteoporosis     Polyneuropathy     Renal manifestation of secondary diabetes mellitus     Sarcoidosis     Ulcer     no cancer    Urinary incontinence        Past Surgical History:   Procedure Laterality Date    CARPAL TUNNEL RELEASE      Rt wrist     CATARACT EXTRACTION W/  INTRAOCULAR LENS IMPLANT Right 2015    Dr. Azevedo    CATARACT EXTRACTION W/  INTRAOCULAR LENS IMPLANT Left 2015    Dr. Azevedo     SECTION      CHOLECYSTECTOMY      INJECTION OF JOINT Left 3/21/2019    Procedure: Injection, Joint  fLUOROSCOPIC jOINT iNJECTION (hIP iNJECTION) LEFT ROCH BURSA AS WELL LEFT TROCHANTERIC BURSA;  Surgeon: Alfonso Richards MD;  Location: [facility];  Service: Pain Management;  Laterality: Left;  NEEDS CONSENT, DIABETIC    INJECTION OF JOINT Left 2019    Procedure: Injection, Joint FLUOROSCOPIC JOINT INJECTION (HIP INJECTION) LEFT HIP;  Surgeon: Alfonso Richards MD;  Location: [facility];  Service: Pain Management;  Laterality: Left;  NEEDS CONSENT    INJECTION OF JOINT Left 2019    Procedure: INJECTION, JOINT;  Surgeon: Alfonso Richards MD;  Location: [facility];  Service: Pain Management;  Laterality: Left;  Left Hip and Left GTB Injections    TUBAL LIGATION       History since last visit: Denies pain, bleeding, or discharge.  Bladder issues: Denies GISSELLE.  Rare UUI if waits to use the restroom.   Bowel issues: Denies constipation or straining.    Current Outpatient Medications   Medication Sig    ACCU-CHEK FASTCLIX LANCING DEV Kit USE AS DIRECTED.    ACCU-CHEK SMARTVIEW TEST STRIP Strp TEST  ONCE  A  DAY    ALCOHOL ANTISEPTIC PADS (ALCOHOL PREP PADS TOP)     atorvastatin (LIPITOR) 20 MG tablet Take 1 tablet (20 mg total) by mouth once daily.    blood-glucose meter kit Use as instructed    carvedilol (COREG) 25 MG tablet Take 1 tablet (25 mg total) by mouth 2 (two) times daily.    cloNIDine (CATAPRES) 0.3 MG tablet TAKE 1 TABLET BY MOUTH THREE TIMES DAILY    epoetin german (PROCRIT INJ) Inject 1,000 Units as directed.    fenofibrate 160 MG Tab Take 1 tablet (160 mg total) by mouth once daily.    FLUZONE HIGH-DOSE , PF, 180 mcg/0.5 mL Syrg PHARMACIST ADMINISTERED IMMUNIZATION ADMINISTERED AT TIME OF DISPENSING  "   folic acid (FOLVITE) 1 MG tablet Take 1 tablet (1,000 mcg total) by mouth once daily.    gabapentin (NEURONTIN) 400 MG capsule Take 1 capsule (400 mg total) by mouth 3 (three) times daily.    HYDROcodone-acetaminophen (NORCO) 5-325 mg per tablet Take 1 tablet by mouth every 6 (six) hours as needed.    levothyroxine (SYNTHROID) 50 MCG tablet TAKE 1 TABLET BY MOUTH ONCE DAILY BEFORE BREAKFAST    metFORMIN (GLUCOPHAGE) 1000 MG tablet TAKE 1 TABLET BY MOUTH TWICE DAILY WITH MEALS    methotrexate 2.5 MG Tab TAKE 6 TABLETS BY MOUTH EVERY 7 DAYS    NIFEdipine (PROCARDIA-XL) 30 MG (OSM) 24 hr tablet Take 1 tablet (30 mg total) by mouth once daily.    tiZANidine (ZANAFLEX) 2 MG tablet Take 2 tablets (4 mg total) by mouth every 8 (eight) hours as needed.    calcium carbonate (OS-MALCOLM) 600 mg calcium (1,500 mg) Tab Take 1 tablet by mouth 2 (two) times daily.     methylPREDNISolone (MEDROL, GEORGI,) 4 mg tablet use as directed    oxybutynin (DITROPAN-XL) 5 MG TR24 Take 1 tablet (5 mg total) by mouth once daily.    predniSONE (DELTASONE) 5 MG tablet Take 1 tablet (5 mg total) by mouth once daily.     No current facility-administered medications for this visit.      ROS:  As per HPI.      Exam  BP (!) 140/80 (BP Location: Left arm, Patient Position: Sitting)   Ht 5' 3" (1.6 m)   Wt 58 kg (127 lb 13.9 oz)   LMP  (LMP Unknown)   BMI 22.65 kg/m²   General: alert and oriented, no acute distress  Respiratory: normal respiratory effort  Abd: soft, non-tender, non-distended    Pelvic  Ext. Genitalia: normal external genitalia. Normal bartholin's and skeens glands  Vagina: + atrophy. No discharge noted.   Non-tender bladder base without palpable mass. S/p colpocleisis channels patent.  Well healed.   Urethra: no masses or tenderness  Urethral meatus: no lesions, caruncle or prolapse.    POP-Q:  No obvious prolapse,      TVS: 7/17/2017 Findings: The uterus measures 5.5 cm x 2.6 cm x 3.5 cm.The uterus is anteflexed. No " uterine masses. The endometrium measures approximately 1 mm. The ovaries are not identified on this exam.  0.7 cm anechoic focus is identified in the left adnexa.  This focus does not have surrounding suspicious features and does not have increased vascularity.  No adnexal abnormalities identified    Impression  1. Urge incontinence            We reviewed the above issues and discussed options for short-term versus long-term management of her problems.       Plan:    Mixed urinary incontinence, urge > stress:   --Bladder diary for 3-7 days, write the number of times you go to the rest room and associated symptoms of urgency or leakage.   --Empty bladder every 3 hours.  Empty well: wait a minute, lean forward on toilet.    --Avoid dietary irritants (see sheet).  Keep diary x 3-5 days to determine your irritants.  --KEGELS: do 10 in AM and 10 in PM, holding each x 10 seconds.  When you feel urge to go, STOP, KEGEL, and when urge has passed, then go to bathroom.     --URGE: start Ditropan 5  mg daily.  SE profile reviewed.    Takes 2-4 weeks to see if will have effect.  For dry mouth: get sour, sugar free lozenge or gum.   --STRESS:  Pessary vs. Sling.       30 minutes were spent in face to face time with this patient  75 % of this time was spent in counseling and/or coordination of care    Meredith Becker DO  Female Pelvic Medicine and Reconstructive Surgery  [facility] [place], [place]

## 2019-10-16 NOTE — PATIENT INSTRUCTIONS
--Bladder diary for 3-7 days, write the number of times you go to the rest room and associated symptoms of urgency or leakage.   --Empty bladder every 3 hours.  Empty well: wait a minute, lean forward on toilet.    --Avoid dietary irritants (see sheet).  Keep diary x 3-5 days to determine your irritants.  --KEGELS: do 10 in AM and 10 in PM, holding each x 10 seconds.  When you feel urge to go, STOP, KEGEL, and when urge has passed, then go to bathroom.     --URGE: start Ditropan 5  mg daily.  SE profile reviewed.    Takes 2-4 weeks to see if will have effect.  For dry mouth: get sour, sugar free lozenge or gum.   --STRESS:  Pessary vs. Sling.

## 2019-10-18 ENCOUNTER — HOSPITAL ENCOUNTER (OUTPATIENT)
Facility: HOSPITAL | Age: 74
Discharge: HOME | End: 2019-10-19
Attending: EMERGENCY MEDICINE | Admitting: HOSPITALIST
Payer: MEDICARE

## 2019-10-18 ENCOUNTER — PATIENT MESSAGE (OUTPATIENT)
Dept: RHEUMATOLOGY | Facility: CLINIC | Age: 74
End: 2019-10-18

## 2019-10-18 DIAGNOSIS — E11.3292 CONTROLLED TYPE 2 DIABETES MELLITUS WITH LEFT EYE AFFECTED BY MILD NONPROLIFERATIVE RETINOPATHY WITHOUT MACULAR EDEMA, WITHOUT LONG-TERM CURRENT USE OF INSULIN: Chronic | ICD-10-CM

## 2019-10-18 DIAGNOSIS — E03.9 HYPOTHYROIDISM, UNSPECIFIED TYPE: Chronic | ICD-10-CM

## 2019-10-18 DIAGNOSIS — D86.9 SARCOIDOSIS: Chronic | ICD-10-CM

## 2019-10-18 DIAGNOSIS — G62.9 POLYNEUROPATHY: ICD-10-CM

## 2019-10-18 DIAGNOSIS — M54.16 LUMBAR RADICULOPATHY: ICD-10-CM

## 2019-10-18 DIAGNOSIS — R20.0 LEFT SIDED NUMBNESS: ICD-10-CM

## 2019-10-18 DIAGNOSIS — R06.09 DYSPNEA ON EXERTION: Primary | ICD-10-CM

## 2019-10-18 DIAGNOSIS — I10 ESSENTIAL HYPERTENSION: Chronic | ICD-10-CM

## 2019-10-18 DIAGNOSIS — M48.02 CERVICAL SPINAL STENOSIS: ICD-10-CM

## 2019-10-18 DIAGNOSIS — E11.8 CONTROLLED TYPE 2 DIABETES MELLITUS WITH COMPLICATION, WITHOUT LONG-TERM CURRENT USE OF INSULIN: Chronic | ICD-10-CM

## 2019-10-18 DIAGNOSIS — R53.81 DEBILITY: ICD-10-CM

## 2019-10-18 DIAGNOSIS — R06.02 SOB (SHORTNESS OF BREATH): ICD-10-CM

## 2019-10-18 DIAGNOSIS — G72.9 MYOPATHY: ICD-10-CM

## 2019-10-18 DIAGNOSIS — E11.69 COMBINED HYPERLIPIDEMIA ASSOCIATED WITH TYPE 2 DIABETES MELLITUS: Chronic | ICD-10-CM

## 2019-10-18 DIAGNOSIS — E78.2 COMBINED HYPERLIPIDEMIA ASSOCIATED WITH TYPE 2 DIABETES MELLITUS: Chronic | ICD-10-CM

## 2019-10-18 DIAGNOSIS — D84.9 IMMUNOSUPPRESSION: Chronic | ICD-10-CM

## 2019-10-18 LAB
ALBUMIN SERPL BCP-MCNC: 4.4 G/DL (ref 3.5–5.2)
ALBUMIN SERPL-MCNC: 3.3 G/DL (ref 3.3–5.5)
ALP SERPL-CCNC: 57 U/L (ref 42–141)
ALP SERPL-CCNC: 65 U/L (ref 55–135)
ALT SERPL W/O P-5'-P-CCNC: 17 U/L (ref 10–44)
ANION GAP SERPL CALC-SCNC: 10 MMOL/L (ref 8–16)
AST SERPL-CCNC: 17 U/L (ref 10–40)
BASOPHILS # BLD AUTO: 0.02 K/UL (ref 0–0.2)
BASOPHILS NFR BLD: 0.4 % (ref 0–1.9)
BILIRUB SERPL-MCNC: 0.4 MG/DL (ref 0.1–1)
BILIRUB SERPL-MCNC: 0.5 MG/DL (ref 0.2–1.6)
BILIRUBIN, POC UA: NEGATIVE
BLOOD, POC UA: NEGATIVE
BUN SERPL-MCNC: 12 MG/DL (ref 8–23)
BUN SERPL-MCNC: 14 MG/DL (ref 7–22)
CALCIUM SERPL-MCNC: 10.2 MG/DL (ref 8.7–10.5)
CALCIUM SERPL-MCNC: 9.2 MG/DL (ref 8–10.3)
CHLORIDE SERPL-SCNC: 101 MMOL/L (ref 98–108)
CHLORIDE SERPL-SCNC: 104 MMOL/L (ref 95–110)
CHOLEST SERPL-MCNC: 136 MG/DL (ref 120–199)
CHOLEST/HDLC SERPL: 2.5 {RATIO} (ref 2–5)
CLARITY, POC UA: ABNORMAL
CO2 SERPL-SCNC: 27 MMOL/L (ref 23–29)
COLOR, POC UA: ABNORMAL
CREAT SERPL-MCNC: 1 MG/DL (ref 0.6–1.2)
CREAT SERPL-MCNC: 1.1 MG/DL (ref 0.5–1.4)
DIFFERENTIAL METHOD: ABNORMAL
EOSINOPHIL # BLD AUTO: 0.1 K/UL (ref 0–0.5)
EOSINOPHIL NFR BLD: 1.8 % (ref 0–8)
ERYTHROCYTE [DISTWIDTH] IN BLOOD BY AUTOMATED COUNT: 14.2 % (ref 11.5–14.5)
EST. GFR  (AFRICAN AMERICAN): 58 ML/MIN/1.73 M^2
EST. GFR  (NON AFRICAN AMERICAN): 50 ML/MIN/1.73 M^2
GLUCOSE SERPL-MCNC: 117 MG/DL (ref 70–110)
GLUCOSE SERPL-MCNC: 188 MG/DL (ref 73–118)
GLUCOSE, POC UA: NEGATIVE
HCT VFR BLD AUTO: 34.8 % (ref 37–48.5)
HDLC SERPL-MCNC: 54 MG/DL (ref 40–75)
HDLC SERPL: 39.7 % (ref 20–50)
HGB BLD-MCNC: 11.3 G/DL (ref 12–16)
IMM GRANULOCYTES # BLD AUTO: 0.06 K/UL (ref 0–0.04)
IMM GRANULOCYTES NFR BLD AUTO: 1.2 % (ref 0–0.5)
KETONES, POC UA: NEGATIVE
LDLC SERPL CALC-MCNC: 47.2 MG/DL (ref 63–159)
LEUKOCYTE EST, POC UA: ABNORMAL
LYMPHOCYTES # BLD AUTO: 1.4 K/UL (ref 1–4.8)
LYMPHOCYTES NFR BLD: 28.1 % (ref 18–48)
MCH RBC QN AUTO: 35.3 PG (ref 27–31)
MCHC RBC AUTO-ENTMCNC: 32.5 G/DL (ref 32–36)
MCV RBC AUTO: 109 FL (ref 82–98)
MONOCYTES # BLD AUTO: 0.4 K/UL (ref 0.3–1)
MONOCYTES NFR BLD: 7.6 % (ref 4–15)
NEUTROPHILS # BLD AUTO: 3.1 K/UL (ref 1.8–7.7)
NEUTROPHILS NFR BLD: 60.9 % (ref 38–73)
NITRITE, POC UA: NEGATIVE
NONHDLC SERPL-MCNC: 82 MG/DL
NRBC BLD-RTO: 1 /100 WBC
PH UR STRIP: 7 [PH]
PLATELET # BLD AUTO: 364 K/UL (ref 150–350)
PMV BLD AUTO: 8.9 FL (ref 9.2–12.9)
POC ALT (SGPT): 17 U/L (ref 10–47)
POC AST (SGOT): 22 U/L (ref 11–38)
POC B-TYPE NATRIURETIC PEPTIDE: 90.3 PG/ML (ref 0–100)
POC CARDIAC TROPONIN I: 0 NG/ML
POC PTINR: 1 (ref 0.9–1.2)
POC PTWBT: 12.3 SEC (ref 9.7–14.3)
POC TCO2: 24 MMOL/L (ref 18–33)
POCT GLUCOSE: 103 MG/DL (ref 70–110)
POTASSIUM BLD-SCNC: 3.3 MMOL/L (ref 3.6–5.1)
POTASSIUM SERPL-SCNC: 4.6 MMOL/L (ref 3.5–5.1)
PROT SERPL-MCNC: 7.7 G/DL (ref 6–8.4)
PROTEIN, POC UA: NEGATIVE
PROTEIN, POC: 6.7 G/DL (ref 6.4–8.1)
RBC # BLD AUTO: 3.2 M/UL (ref 4–5.4)
SAMPLE: NORMAL
SAMPLE: NORMAL
SODIUM BLD-SCNC: 145 MMOL/L (ref 128–145)
SODIUM SERPL-SCNC: 141 MMOL/L (ref 136–145)
SPECIFIC GRAVITY, POC UA: 1.01
TRIGL SERPL-MCNC: 174 MG/DL (ref 30–150)
TSH SERPL DL<=0.005 MIU/L-ACNC: 1.33 UIU/ML (ref 0.4–4)
UROBILINOGEN, POC UA: 0.2 E.U./DL
WBC # BLD AUTO: 5.12 K/UL (ref 3.9–12.7)

## 2019-10-18 PROCEDURE — 93010 EKG 12-LEAD: ICD-10-PCS | Mod: HCNC,,, | Performed by: INTERNAL MEDICINE

## 2019-10-18 PROCEDURE — 80053 COMPREHEN METABOLIC PANEL: CPT | Mod: HCNC

## 2019-10-18 PROCEDURE — 93010 ELECTROCARDIOGRAM REPORT: CPT | Mod: HCNC,,, | Performed by: INTERNAL MEDICINE

## 2019-10-18 PROCEDURE — 25000003 PHARM REV CODE 250: Mod: HCNC,ER | Performed by: EMERGENCY MEDICINE

## 2019-10-18 PROCEDURE — 25000003 PHARM REV CODE 250: Mod: HCNC | Performed by: PHYSICIAN ASSISTANT

## 2019-10-18 PROCEDURE — G0378 HOSPITAL OBSERVATION PER HR: HCPCS | Mod: HCNC

## 2019-10-18 PROCEDURE — 84484 ASSAY OF TROPONIN QUANT: CPT | Mod: HCNC,ER

## 2019-10-18 PROCEDURE — 80061 LIPID PANEL: CPT | Mod: HCNC

## 2019-10-18 PROCEDURE — 84443 ASSAY THYROID STIM HORMONE: CPT | Mod: HCNC

## 2019-10-18 PROCEDURE — 94761 N-INVAS EAR/PLS OXIMETRY MLT: CPT | Mod: HCNC

## 2019-10-18 PROCEDURE — 85610 PROTHROMBIN TIME: CPT | Mod: HCNC,ER

## 2019-10-18 PROCEDURE — 93005 ELECTROCARDIOGRAM TRACING: CPT | Mod: HCNC,ER

## 2019-10-18 PROCEDURE — 83880 ASSAY OF NATRIURETIC PEPTIDE: CPT | Mod: HCNC,ER

## 2019-10-18 PROCEDURE — 85025 COMPLETE CBC W/AUTO DIFF WBC: CPT | Mod: HCNC

## 2019-10-18 PROCEDURE — 99285 EMERGENCY DEPT VISIT HI MDM: CPT | Mod: 25,HCNC,ER

## 2019-10-18 PROCEDURE — 81003 URINALYSIS AUTO W/O SCOPE: CPT | Mod: HCNC,ER

## 2019-10-18 PROCEDURE — 80053 COMPREHEN METABOLIC PANEL: CPT | Mod: HCNC,ER

## 2019-10-18 PROCEDURE — 85025 COMPLETE CBC W/AUTO DIFF WBC: CPT | Mod: HCNC,ER

## 2019-10-18 PROCEDURE — 36415 COLL VENOUS BLD VENIPUNCTURE: CPT | Mod: HCNC

## 2019-10-18 RX ORDER — ASPIRIN 81 MG/1
81 TABLET ORAL DAILY
Status: DISCONTINUED | OUTPATIENT
Start: 2019-10-19 | End: 2019-10-19 | Stop reason: HOSPADM

## 2019-10-18 RX ORDER — RAMELTEON 8 MG/1
8 TABLET ORAL NIGHTLY PRN
Status: DISCONTINUED | OUTPATIENT
Start: 2019-10-18 | End: 2019-10-19 | Stop reason: HOSPADM

## 2019-10-18 RX ORDER — CLONIDINE HYDROCHLORIDE 0.1 MG/1
0.1 TABLET ORAL EVERY 6 HOURS PRN
Status: DISCONTINUED | OUTPATIENT
Start: 2019-10-18 | End: 2019-10-19 | Stop reason: HOSPADM

## 2019-10-18 RX ORDER — FOLIC ACID 1 MG/1
1000 TABLET ORAL DAILY
Status: DISCONTINUED | OUTPATIENT
Start: 2019-10-19 | End: 2019-10-19 | Stop reason: HOSPADM

## 2019-10-18 RX ORDER — ACETAMINOPHEN 500 MG
500 TABLET ORAL EVERY 6 HOURS PRN
Status: DISCONTINUED | OUTPATIENT
Start: 2019-10-18 | End: 2019-10-19 | Stop reason: HOSPADM

## 2019-10-18 RX ORDER — IBUPROFEN 200 MG
24 TABLET ORAL
Status: DISCONTINUED | OUTPATIENT
Start: 2019-10-18 | End: 2019-10-19 | Stop reason: HOSPADM

## 2019-10-18 RX ORDER — PREDNISONE 5 MG/1
5 TABLET ORAL DAILY
Status: DISCONTINUED | OUTPATIENT
Start: 2019-10-19 | End: 2019-10-19 | Stop reason: HOSPADM

## 2019-10-18 RX ORDER — LORAZEPAM 2 MG/ML
1 INJECTION INTRAMUSCULAR
Status: DISCONTINUED | OUTPATIENT
Start: 2019-10-18 | End: 2019-10-19 | Stop reason: HOSPADM

## 2019-10-18 RX ORDER — POTASSIUM CHLORIDE 20 MEQ/15ML
40 SOLUTION ORAL
Status: COMPLETED | OUTPATIENT
Start: 2019-10-18 | End: 2019-10-18

## 2019-10-18 RX ORDER — GABAPENTIN 400 MG/1
400 CAPSULE ORAL 3 TIMES DAILY
Status: DISCONTINUED | OUTPATIENT
Start: 2019-10-18 | End: 2019-10-19 | Stop reason: HOSPADM

## 2019-10-18 RX ORDER — SODIUM CHLORIDE 0.9 % (FLUSH) 0.9 %
10 SYRINGE (ML) INJECTION
Status: DISCONTINUED | OUTPATIENT
Start: 2019-10-18 | End: 2019-10-19 | Stop reason: HOSPADM

## 2019-10-18 RX ORDER — IBUPROFEN 200 MG
16 TABLET ORAL
Status: DISCONTINUED | OUTPATIENT
Start: 2019-10-18 | End: 2019-10-19 | Stop reason: HOSPADM

## 2019-10-18 RX ORDER — LEVOTHYROXINE SODIUM 50 UG/1
50 TABLET ORAL
Status: DISCONTINUED | OUTPATIENT
Start: 2019-10-19 | End: 2019-10-19 | Stop reason: HOSPADM

## 2019-10-18 RX ORDER — LORAZEPAM 2 MG/ML
1 INJECTION INTRAMUSCULAR ONCE
Status: DISCONTINUED | OUTPATIENT
Start: 2019-10-18 | End: 2019-10-18

## 2019-10-18 RX ORDER — ATORVASTATIN CALCIUM 10 MG/1
20 TABLET, FILM COATED ORAL DAILY
Status: DISCONTINUED | OUTPATIENT
Start: 2019-10-19 | End: 2019-10-19 | Stop reason: HOSPADM

## 2019-10-18 RX ORDER — INSULIN ASPART 100 [IU]/ML
0-5 INJECTION, SOLUTION INTRAVENOUS; SUBCUTANEOUS
Status: DISCONTINUED | OUTPATIENT
Start: 2019-10-18 | End: 2019-10-19 | Stop reason: HOSPADM

## 2019-10-18 RX ORDER — GLUCAGON 1 MG
1 KIT INJECTION
Status: DISCONTINUED | OUTPATIENT
Start: 2019-10-18 | End: 2019-10-19 | Stop reason: HOSPADM

## 2019-10-18 RX ORDER — ONDANSETRON 2 MG/ML
4 INJECTION INTRAMUSCULAR; INTRAVENOUS EVERY 6 HOURS PRN
Status: DISCONTINUED | OUTPATIENT
Start: 2019-10-18 | End: 2019-10-19 | Stop reason: HOSPADM

## 2019-10-18 RX ADMIN — POTASSIUM CHLORIDE 40 MEQ: 20 SOLUTION ORAL at 04:10

## 2019-10-18 RX ADMIN — GABAPENTIN 400 MG: 400 CAPSULE ORAL at 09:10

## 2019-10-18 NOTE — ED PROVIDER NOTES
Encounter Date: 10/18/2019       History     Chief Complaint   Patient presents with    Numbness     pt states that last night she started with left sided face tingling that goes down to the left shoulder.     Shortness of Breath     pt states that she has been having sob since august and was seen for it but the sob is not getting any better.      This is a 73-year-old female with history of hypertension, hyperlipidemia, diabetes, CKD, osteoarthritis of the left hip, chronic pain comes in complaining of worsening shortness of breath and dyspnea on exertion.  Patient reports that the symptoms started about 2 months ago but have been progressively getting worse.  She reports that she feels short of breath when she talks and with minimal exertion.  She is now having some shortness of breath at rest.  She reports that she is also short of breath at night and is having trouble sleeping as she wakes up in the middle the night gasping for air.  She denies any lower extremity edema.  She reports that she was seen by her cardiologist who thought she might have a respiratory infection but that he did not do any workup.  He prescribed her antibiotics that she is not taken.  She reports history of a remote stress test but no recent echo.  She denies any known history of CHF.  Patient also reports that last night she began noticing that the left side of her body felt numb and tingly.  She reports that symptoms initially started on her face and she felt like there were pins and needles on the left side of her face that then extended down her arm and leg.  She reports that the symptoms have slightly improved but she is continuing to feel numbness over the left side of her face.  She denies any focal weakness but does report that she has worsening pain on her left leg from prior osteoarthritis so is unaware whether or not that leg is weaker at baseline.  Patient denies any history of prior numbness and reports that the symptoms  that started yesterday were 1st time occurrence.  She denies any headache or blurry vision.  She denies any exacerbating or alleviating factors.        Review of patient's allergies indicates:   Allergen Reactions    Azathioprine Shortness Of Breath and Other (See Comments)     Fatigue     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, without long-term current use of insulin     Depression     Diabetes mellitus, type 2     Eye injury as a child     k-abrasion  od    Hyperlipidemia     Hypertension     Hypothyroidism     Immune deficiency disorder     Immune disorder     Myalgia and myositis 2012    Osteoporosis     Polyneuropathy     Renal manifestation of secondary diabetes mellitus     Sarcoidosis     Ulcer     no cancer    Urinary incontinence      Past Surgical History:   Procedure Laterality Date    CARPAL TUNNEL RELEASE      Rt wrist    CATARACT EXTRACTION W/  INTRAOCULAR LENS IMPLANT Right 2015    Dr. Azevedo    CATARACT EXTRACTION W/  INTRAOCULAR LENS IMPLANT Left 2015    Dr. Azevedo     SECTION      CHOLECYSTECTOMY      INJECTION OF JOINT Left 3/21/2019    Procedure: Injection, Joint  fLUOROSCOPIC jOINT iNJECTION (hIP iNJECTION) LEFT ROCH BURSA AS WELL LEFT TROCHANTERIC BURSA;  Surgeon: Alfonso Richards MD;  [facility];  Service: Pain Management;  Laterality: Left;  NEEDS CONSENT, DIABETIC    INJECTION OF JOINT Left 2019    Procedure: Injection, Joint FLUOROSCOPIC JOINT INJECTION (HIP INJECTION) LEFT HIP;  Surgeon: Alfonso Richards MD;  [facility]: [facility];  Service: Pain Management;  Laterality: Left;  NEEDS CONSENT    INJECTION OF JOINT  Left 9/12/2019    Procedure: INJECTION, JOINT;  Surgeon: Alfonso Richards MD;  Location: [facility];  Service: Pain Management;  Laterality: Left;  Left Hip and Left GTB Injections    TUBAL LIGATION       Family History   Problem Relation Age of Onset    Hypertension Mother     Cataracts Mother     No Known Problems Father     Hypertension Maternal Grandmother     Glaucoma Sister     Arthritis Sister     No Known Problems Brother     No Known Problems Maternal Aunt     No Known Problems Maternal Uncle     No Known Problems Paternal Aunt     No Known Problems Paternal Uncle     No Known Problems Maternal Grandfather     No Known Problems Paternal Grandmother     No Known Problems Paternal Grandfather     Kidney failure Sister     Hepatitis Sister     Cancer Sister         bone cancer     Immunodeficiency Sister     Diabetes Son     Hypertension Son     Lupus Neg Hx     Rheum arthritis Neg Hx     Amblyopia Neg Hx     Blindness Neg Hx     Macular degeneration Neg Hx     Retinal detachment Neg Hx     Strabismus Neg Hx     Stroke Neg Hx     Thyroid disease Neg Hx     Endometrial cancer Neg Hx     Vaginal cancer Neg Hx     Cervical cancer Neg Hx      Social History     Tobacco Use    Smoking status: Never Smoker    Smokeless tobacco: Never Used   Substance Use Topics    Alcohol use: No    Drug use: No     Review of Systems   Constitutional: Negative for chills and fever.   HENT: Negative for congestion, sore throat and trouble swallowing.    Respiratory: Positive for shortness of breath. Negative for cough.    Cardiovascular: Negative for chest pain and palpitations.   Gastrointestinal: Negative for abdominal pain, diarrhea, nausea and vomiting.   Musculoskeletal: Positive for gait problem. Negative for back pain and neck pain.   Neurological: Positive for weakness and numbness. Negative for headaches.   All other systems reviewed and are negative.      Physical Exam     Initial Vitals  [10/18/19 1559]   BP Pulse Resp Temp SpO2   (!) 174/79 (!) 111 (!) 22 97.9 °F (36.6 °C) 99 %      MAP       --         Physical Exam    Nursing note and vitals reviewed.  Constitutional: Vital signs are normal. She appears well-developed and well-nourished.  Non-toxic appearance. No distress.   HENT:   Head: Normocephalic and atraumatic.   Mouth/Throat: Oropharynx is clear and moist.   Eyes: Conjunctivae and EOM are normal. Pupils are equal, round, and reactive to light.   Neck: Normal range of motion. Neck supple. No muscular tenderness present. No neck rigidity.   Cardiovascular: Normal rate, regular rhythm and intact distal pulses.   Pulmonary/Chest: Breath sounds normal. She has no wheezes.   Abdominal: Soft. Normal appearance and bowel sounds are normal. There is no tenderness.   Musculoskeletal: Normal range of motion. She exhibits no edema.   Lymphadenopathy:     She has no cervical adenopathy.     She has no axillary adenopathy.   Neurological: She is alert and oriented to person, place, and time. No cranial nerve deficit. Gait normal. GCS score is 15. GCS eye subscore is 4. GCS verbal subscore is 5. GCS motor subscore is 6.   Subjective left facial numbness, no upper extremity motor discrepancy noted, left lower extremity 4/5 strength but limited by pain.   Skin: Skin is warm, dry and intact. No rash noted.   Psychiatric: She has a normal mood and affect. Her behavior is normal.         ED Course   Procedures  Labs Reviewed   POCT CBC   POCT CMP   POCT TROPONIN   POCT B-TYPE NATRIURETIC PEPTIDE (BNP)   POCT PROTIME-INR     EKG Readings: (Independently Interpreted)   Time: 10/18/19 15:52  Rate: 98 bpm  Normal sinus rhythm.  No ST or T-wave abnormalities.  Unchanged from prior.       Imaging Results          X-Ray Chest PA And Lateral (In process)                CT Head Without Contrast (In process)                  Medical Decision Making:   Initial Assessment:   This is a year old female with history of  hypertension, hyperlipidemia, diabetes, CKD, left hip osteoarthritis, chronic pain who comes in complaining of worsening dyspnea on exertion as well as left-sided numbness.  On examination she is hypertensive and tachycardic.  On physical exam she has clear lungs bilaterally.  She has no lower extremity edema.  She does have subjective left face numbness.  She had 4/5 strength in her left lower extremity which she is wearing a brace on that ankle and has pain with motion. Orders included head CT, EKG, CBC, CMP, troponin, BNP, chest x-ray.  Differential Diagnosis:   New onset CHF, hypertensive emergency, ACS, arrhythmia, pulmonary edema, PE, CVA, TIA, intracranial hemorrhage, paresthesias.  Independently Interpreted Test(s):   I have ordered and independently interpreted X-rays - see summary below.       <> Summary of X-Ray Reading(s): Chest x-ray and head CT were independently interpreted by me.  Chest x-ray showed no infiltrate or effusion.  Head CT had no acute intracranial hemorrhage. There was no evidence of acute CVA.  Clinical Tests:   Lab Tests: Ordered and Reviewed  The following lab test(s) were unremarkable: CMP, CBC, BNP, Troponin and Urinalysis       <> Summary of Lab: Labs were reviewed were significant for potassium is 3.3 which was repleted.  Troponin and BNP were negative.  ED Management:  Patient's ER workup was unremarkable with no evidence of pulmonary edema on chest x-ray.  Her troponin and BNP were negative.  Head CT showed no evidence of acute pathology.  Her symptoms certainly are concerning for TIA versus CVA however with left-sided numbness.  Patient is not a candidate for TPA since symptoms have been ongoing for greater than 18 hours. It is unclear the exact etiology of her shortness of breath but it has been ongoing for 2 months.  Patient will be transferred for admission to [facility]. Case was discussed with Ashish CHRISTIANSEN) who was covering for hospitalist Dr. Rodriguez and patient was  accepted for transfer and admission. I discussed findings with patient and daughter. I advised them of the dispo plan which they were comfortable with. Any concerns or questions were addressed. They verbalized understanding.                      Clinical Impression:     1. Dyspnea on exertion  2. Left sided numbness      Disposition:   Disposition: Admitted  Condition: Stable                        Tasia Neville MD  10/18/19 1700

## 2019-10-18 NOTE — ED NOTES
Spoke with tennille about eta was informed that it will be another 30 min. I was informed that the [place] [facility] [place] get mrs. Lawrence. V/u

## 2019-10-18 NOTE — NURSING
Report received from DELFINO Abbasi at [facility]. Awaiting patients arrival to unit.

## 2019-10-18 NOTE — ED NOTES
Attempted to call pft for transportation unable to get in touch with any one. Will call back shortly.

## 2019-10-19 VITALS
SYSTOLIC BLOOD PRESSURE: 156 MMHG | TEMPERATURE: 98 F | DIASTOLIC BLOOD PRESSURE: 81 MMHG | RESPIRATION RATE: 19 BRPM | OXYGEN SATURATION: 99 % | HEIGHT: 61 IN | WEIGHT: 122 LBS | HEART RATE: 93 BPM | BODY MASS INDEX: 23.03 KG/M2

## 2019-10-19 LAB
ALBUMIN SERPL BCP-MCNC: 4.1 G/DL (ref 3.5–5.2)
ALP SERPL-CCNC: 59 U/L (ref 55–135)
ALT SERPL W/O P-5'-P-CCNC: 16 U/L (ref 10–44)
ANION GAP SERPL CALC-SCNC: 11 MMOL/L (ref 8–16)
AORTIC ROOT ANNULUS: 3.02 CM
AORTIC VALVE CUSP SEPERATION: 1.61 CM
APTT BLDCRRT: 27 SEC (ref 21–32)
ASCENDING AORTA: 2.76 CM
AST SERPL-CCNC: 15 U/L (ref 10–40)
AV INDEX (PROSTH): 0.64
AV MEAN GRADIENT: 3 MMHG
AV PEAK GRADIENT: 6 MMHG
AV VALVE AREA: 1.51 CM2
AV VELOCITY RATIO: 0.68
BASOPHILS # BLD AUTO: 0.02 K/UL (ref 0–0.2)
BASOPHILS NFR BLD: 0.4 % (ref 0–1.9)
BILIRUB SERPL-MCNC: 0.5 MG/DL (ref 0.1–1)
BILIRUB UR QL STRIP: NEGATIVE
BSA FOR ECHO PROCEDURE: 1.54 M2
BUN SERPL-MCNC: 14 MG/DL (ref 8–23)
CALCIUM SERPL-MCNC: 9.7 MG/DL (ref 8.7–10.5)
CHLORIDE SERPL-SCNC: 103 MMOL/L (ref 95–110)
CK MB SERPL-MCNC: 1.2 NG/ML (ref 0.1–6.5)
CK MB SERPL-RTO: 1.2 % (ref 0–5)
CK SERPL-CCNC: 100 U/L (ref 20–180)
CLARITY UR: CLEAR
CO2 SERPL-SCNC: 25 MMOL/L (ref 23–29)
COLOR UR: NORMAL
CREAT SERPL-MCNC: 0.9 MG/DL (ref 0.5–1.4)
CRP SERPL-MCNC: 8.4 MG/L (ref 0–8.2)
CV ECHO LV RWT: 0.85 CM
DIFFERENTIAL METHOD: ABNORMAL
DOP CALC AO PEAK VEL: 1.18 M/S
DOP CALC AO VTI: 22.22 CM
DOP CALC LVOT AREA: 2.3 CM2
DOP CALC LVOT DIAMETER: 1.73 CM
DOP CALC LVOT PEAK VEL: 0.8 M/S
DOP CALC LVOT STROKE VOLUME: 33.64 CM3
DOP CALCLVOT PEAK VEL VTI: 14.32 CM
E WAVE DECELERATION TIME: 171.97 MSEC
E/A RATIO: 0.43
E/E' RATIO: 10.2 M/S
ECHO LV POSTERIOR WALL: 1.38 CM (ref 0.6–1.1)
EOSINOPHIL # BLD AUTO: 0.1 K/UL (ref 0–0.5)
EOSINOPHIL NFR BLD: 1.5 % (ref 0–8)
ERYTHROCYTE [DISTWIDTH] IN BLOOD BY AUTOMATED COUNT: 14.2 % (ref 11.5–14.5)
ERYTHROCYTE [SEDIMENTATION RATE] IN BLOOD BY WESTERGREN METHOD: 22 MM/HR (ref 0–20)
EST. GFR  (AFRICAN AMERICAN): >60 ML/MIN/1.73 M^2
EST. GFR  (NON AFRICAN AMERICAN): >60 ML/MIN/1.73 M^2
FRACTIONAL SHORTENING: 28 % (ref 28–44)
GLUCOSE SERPL-MCNC: 123 MG/DL (ref 70–110)
GLUCOSE UR QL STRIP: NEGATIVE
HCT VFR BLD AUTO: 34.7 % (ref 37–48.5)
HGB BLD-MCNC: 11.1 G/DL (ref 12–16)
HGB UR QL STRIP: NEGATIVE
IMM GRANULOCYTES # BLD AUTO: 0.07 K/UL (ref 0–0.04)
IMM GRANULOCYTES NFR BLD AUTO: 1.5 % (ref 0–0.5)
INR PPP: 1 (ref 0.8–1.2)
INTERVENTRICULAR SEPTUM: 1.39 CM (ref 0.6–1.1)
IVRT: 0.12 MSEC
KETONES UR QL STRIP: NEGATIVE
LA MAJOR: 4.58 CM
LA MINOR: 5.08 CM
LA WIDTH: 3.93 CM
LEFT ATRIUM SIZE: 2.99 CM
LEFT ATRIUM VOLUME INDEX: 31.4 ML/M2
LEFT ATRIUM VOLUME: 48.11 CM3
LEFT INTERNAL DIMENSION IN SYSTOLE: 2.35 CM (ref 2.1–4)
LEFT VENTRICLE DIASTOLIC VOLUME INDEX: 27.79 ML/M2
LEFT VENTRICLE DIASTOLIC VOLUME: 42.55 ML
LEFT VENTRICLE MASS INDEX: 100 G/M2
LEFT VENTRICLE SYSTOLIC VOLUME INDEX: 12.4 ML/M2
LEFT VENTRICLE SYSTOLIC VOLUME: 19.03 ML
LEFT VENTRICULAR INTERNAL DIMENSION IN DIASTOLE: 3.25 CM (ref 3.5–6)
LEFT VENTRICULAR MASS: 153.55 G
LEUKOCYTE ESTERASE UR QL STRIP: NEGATIVE
LV LATERAL E/E' RATIO: 8.5 M/S
LV SEPTAL E/E' RATIO: 12.75 M/S
LYMPHOCYTES # BLD AUTO: 1.2 K/UL (ref 1–4.8)
LYMPHOCYTES NFR BLD: 27.1 % (ref 18–48)
MAGNESIUM SERPL-MCNC: 1.5 MG/DL (ref 1.6–2.6)
MCH RBC QN AUTO: 34.7 PG (ref 27–31)
MCHC RBC AUTO-ENTMCNC: 32 G/DL (ref 32–36)
MCV RBC AUTO: 108 FL (ref 82–98)
MONOCYTES # BLD AUTO: 0.4 K/UL (ref 0.3–1)
MONOCYTES NFR BLD: 7.6 % (ref 4–15)
MV PEAK A VEL: 1.19 M/S
MV PEAK E VEL: 0.51 M/S
NEUTROPHILS # BLD AUTO: 2.8 K/UL (ref 1.8–7.7)
NEUTROPHILS NFR BLD: 61.9 % (ref 38–73)
NITRITE UR QL STRIP: NEGATIVE
NRBC BLD-RTO: 0 /100 WBC
PH UR STRIP: 8 [PH] (ref 5–8)
PHOSPHATE SERPL-MCNC: 3.3 MG/DL (ref 2.7–4.5)
PISA TR MAX VEL: 2.59 M/S
PLATELET # BLD AUTO: 371 K/UL (ref 150–350)
PMV BLD AUTO: 8.9 FL (ref 9.2–12.9)
POCT GLUCOSE: 110 MG/DL (ref 70–110)
POCT GLUCOSE: 112 MG/DL (ref 70–110)
POTASSIUM SERPL-SCNC: 3.4 MMOL/L (ref 3.5–5.1)
PROT SERPL-MCNC: 7.1 G/DL (ref 6–8.4)
PROT UR QL STRIP: NEGATIVE
PROTHROMBIN TIME: 10.2 SEC (ref 9–12.5)
PULM VEIN S/D RATIO: 1.74
PV PEAK D VEL: 0.31 M/S
PV PEAK S VEL: 0.54 M/S
PV PEAK VELOCITY: 0.79 CM/S
RA MAJOR: 4.5 CM
RA PRESSURE: 3 MMHG
RA WIDTH: 3.25 CM
RBC # BLD AUTO: 3.2 M/UL (ref 4–5.4)
RIGHT VENTRICULAR END-DIASTOLIC DIMENSION: 2.86 CM
RV TISSUE DOPPLER FREE WALL SYSTOLIC VELOCITY 1 (APICAL 4 CHAMBER VIEW): 12.22 CM/S
SINUS: 2.82 CM
SODIUM SERPL-SCNC: 139 MMOL/L (ref 136–145)
SP GR UR STRIP: 1.01 (ref 1–1.03)
STJ: 2.49 CM
TDI LATERAL: 0.06 M/S
TDI SEPTAL: 0.04 M/S
TDI: 0.05 M/S
TR MAX PG: 27 MMHG
TRICUSPID ANNULAR PLANE SYSTOLIC EXCURSION: 1.4 CM
TROPONIN I SERPL DL<=0.01 NG/ML-MCNC: <0.006 NG/ML (ref 0–0.03)
TV REST PULMONARY ARTERY PRESSURE: 30 MMHG
URN SPEC COLLECT METH UR: NORMAL
UROBILINOGEN UR STRIP-ACNC: NEGATIVE EU/DL
WBC # BLD AUTO: 4.58 K/UL (ref 3.9–12.7)

## 2019-10-19 PROCEDURE — 99204 PR OFFICE/OUTPT VISIT, NEW, LEVL IV, 45-59 MIN: ICD-10-PCS | Mod: HCNC,,, | Performed by: PSYCHIATRY & NEUROLOGY

## 2019-10-19 PROCEDURE — 81003 URINALYSIS AUTO W/O SCOPE: CPT | Mod: HCNC

## 2019-10-19 PROCEDURE — G0378 HOSPITAL OBSERVATION PER HR: HCPCS | Mod: HCNC

## 2019-10-19 PROCEDURE — 63600175 PHARM REV CODE 636 W HCPCS: Mod: HCNC | Performed by: NURSE PRACTITIONER

## 2019-10-19 PROCEDURE — 82550 ASSAY OF CK (CPK): CPT | Mod: HCNC

## 2019-10-19 PROCEDURE — 83735 ASSAY OF MAGNESIUM: CPT | Mod: HCNC

## 2019-10-19 PROCEDURE — 86140 C-REACTIVE PROTEIN: CPT | Mod: HCNC

## 2019-10-19 PROCEDURE — 94761 N-INVAS EAR/PLS OXIMETRY MLT: CPT | Mod: HCNC

## 2019-10-19 PROCEDURE — 96374 THER/PROPH/DIAG INJ IV PUSH: CPT

## 2019-10-19 PROCEDURE — 25000003 PHARM REV CODE 250: Mod: HCNC | Performed by: PHYSICIAN ASSISTANT

## 2019-10-19 PROCEDURE — 85610 PROTHROMBIN TIME: CPT | Mod: HCNC

## 2019-10-19 PROCEDURE — 82553 CREATINE MB FRACTION: CPT | Mod: HCNC

## 2019-10-19 PROCEDURE — 84484 ASSAY OF TROPONIN QUANT: CPT | Mod: HCNC

## 2019-10-19 PROCEDURE — 36415 COLL VENOUS BLD VENIPUNCTURE: CPT | Mod: HCNC

## 2019-10-19 PROCEDURE — 80053 COMPREHEN METABOLIC PANEL: CPT | Mod: HCNC

## 2019-10-19 PROCEDURE — 85730 THROMBOPLASTIN TIME PARTIAL: CPT | Mod: HCNC

## 2019-10-19 PROCEDURE — 99204 OFFICE O/P NEW MOD 45 MIN: CPT | Mod: HCNC,,, | Performed by: PSYCHIATRY & NEUROLOGY

## 2019-10-19 PROCEDURE — 85652 RBC SED RATE AUTOMATED: CPT | Mod: HCNC

## 2019-10-19 PROCEDURE — 84100 ASSAY OF PHOSPHORUS: CPT | Mod: HCNC

## 2019-10-19 PROCEDURE — 85025 COMPLETE CBC W/AUTO DIFF WBC: CPT | Mod: HCNC

## 2019-10-19 RX ORDER — METHYLPREDNISOLONE 4 MG/1
TABLET ORAL
Qty: 1 PACKAGE | Refills: 0 | Status: SHIPPED | OUTPATIENT
Start: 2019-10-19 | End: 2019-11-07

## 2019-10-19 RX ORDER — ENOXAPARIN SODIUM 100 MG/ML
40 INJECTION SUBCUTANEOUS EVERY 24 HOURS
Status: DISCONTINUED | OUTPATIENT
Start: 2019-10-19 | End: 2019-10-19 | Stop reason: HOSPADM

## 2019-10-19 RX ORDER — PREDNISONE 5 MG/1
5 TABLET ORAL DAILY
Qty: 90 TABLET | Refills: 1 | Status: SHIPPED | OUTPATIENT
Start: 2019-10-19 | End: 2020-01-29 | Stop reason: SDUPTHER

## 2019-10-19 RX ORDER — METHYLPREDNISOLONE SOD SUCC 125 MG
60 VIAL (EA) INJECTION ONCE
Status: COMPLETED | OUTPATIENT
Start: 2019-10-19 | End: 2019-10-19

## 2019-10-19 RX ADMIN — ACETAMINOPHEN 500 MG: 500 TABLET ORAL at 02:10

## 2019-10-19 RX ADMIN — ATORVASTATIN CALCIUM 20 MG: 10 TABLET, FILM COATED ORAL at 08:10

## 2019-10-19 RX ADMIN — FOLIC ACID 1000 MCG: 1 TABLET ORAL at 08:10

## 2019-10-19 RX ADMIN — ASPIRIN 81 MG: 81 TABLET, COATED ORAL at 08:10

## 2019-10-19 RX ADMIN — GABAPENTIN 400 MG: 400 CAPSULE ORAL at 08:10

## 2019-10-19 RX ADMIN — LEVOTHYROXINE SODIUM 50 MCG: 50 TABLET ORAL at 06:10

## 2019-10-19 RX ADMIN — METHYLPREDNISOLONE SODIUM SUCCINATE 60 MG: 125 INJECTION, POWDER, FOR SOLUTION INTRAMUSCULAR; INTRAVENOUS at 10:10

## 2019-10-19 NOTE — HPI
Regino Lawrence 73 y.o. female with sarcoidosis, HTN, HLD, DM2, hypothyroid presents to the hospital with a chief complaint of left sided numbness. She reports last night she awoke with left sided numbness initially on the left side of her face but then extended to left arm and leg. The left arm and leg have since improved, but she still reports left sided facial numbness. She denies any weakness on her left side, she denies headache, vision changes, slurred speech, difficulty swallowing, chest pain, dysuria, dizziness.  Her left side is slightly weaker at baseline due to arthritis, and she is unsure if there is new weakness there today. She has had an MRI in the past, that she reports required medication to sedate due to claustrophobia.    She also reports SOB and dyspnea on exertion for the last 2 months. She has been seen by her PCP for this and was instructed to present to ED for CTA. The CTA was negative for PE. She has also seen a cardiologist. Her echo/stress test in 4/2019 were with EF of 55% and no ischemia. She denies any leg swelling associated with this. The SOB has been progressive for the last 2 months. It now makes it difficult to ambulate. She believes her SOB may be related to sarcoidosis. Her sarcoidosis previously manifested as arthralgias and myalgias.     In the ED, EKG of sinus rhythm, CT head without acuet hemorrhage or large infarct, chest x-ray with clear lungs, troponin and BNP negative, UA without leukocytes or nitrites.

## 2019-10-19 NOTE — ASSESSMENT & PLAN NOTE
Poorly controlled holding home clonidine, coreg, and procardia until results of MRI. PRN clonidine

## 2019-10-19 NOTE — ASSESSMENT & PLAN NOTE
Possibly related to worsening dyspnea. Will continue home prednisone, see above. Not due to methotrexate until Monday.

## 2019-10-19 NOTE — ASSESSMENT & PLAN NOTE
Unclear as to cause. Symptoms ongoing for 2 months.  BNP negative, troponin negative, CTA negative 1 month ago for PE, chest x-ray without acute process in lungs, Echo in 4/2019 with EF of 55% and grade 1 diastolic dysfunction. Possibly related to worsening sarcoid.   Will consult pulmonology   Repeat echo

## 2019-10-19 NOTE — PLAN OF CARE
10/19/19 1011   Post-Acute Status   Post-Acute Authorization Other   Other Status No Post-Acute Service Needs   Discharge Delays None known at this time   Per Dr Pruitt stated ok for patient to DC.  Nurse, Arlette notified that all CM needs are met.

## 2019-10-19 NOTE — DISCHARGE SUMMARY
[facility] Hospital Medicine  Discharge Summary      Patient Name: Regino Lawrence  MRN: [id number]  Admission Date: 10/18/2019  Hospital Length of Stay: 0 days  Discharge Date and Time:  10/19/2019 9:53 AM  Attending Physician: Anne Rodriguez MD   Discharging Provider: CARLOS Carver  Primary Care Provider: Micaela Mendoza MD      HPI:   Regino Lawrence 73 y.o. female with sarcoidosis, HTN, HLD, DM2, hypothyroid presents to the hospital with a chief complaint of left sided numbness. She reports last night she awoke with left sided numbness initially on the left side of her face but then extended to left arm and leg. The left arm and leg have since improved, but she still reports left sided facial numbness. She denies any weakness on her left side, she denies headache, vision changes, slurred speech, difficulty swallowing, chest pain, dysuria, dizziness.  Her left side is slightly weaker at baseline due to arthritis, and she is unsure if there is new weakness there today. She has had an MRI in the past, that she reports required medication to sedate due to claustrophobia.    She also reports SOB and dyspnea on exertion for the last 2 months. She has been seen by her PCP for this and was instructed to present to ED for CTA. The CTA was negative for PE. She has also seen a cardiologist. Her echo/stress test in 4/2019 were with EF of 55% and no ischemia. She denies any leg swelling associated with this. The SOB has been progressive for the last 2 months. It now makes it difficult to ambulate. She believes her SOB may be related to sarcoidosis. Her sarcoidosis previously manifested as arthralgias and myalgias.     In the ED, EKG of sinus rhythm, CT head without acuet hemorrhage or large infarct, chest x-ray with clear lungs, troponin and BNP negative, UA without leukocytes or nitrites.     * No surgery found *      Hospital Course:   Regino Lawrence is a  73 y.o. female very pleasant, who was  placed in observation for left sided numbness. In the ED, EKG of sinus rhythm, CT head without acuet hemorrhage or large infarct, chest x-ray with clear lungs, troponin and BNP negative, UA without leukocytes or nitrites. MRI negative for acute stroke. She was noted to have mild dehydration with decreased GFR=58 which normalized with IV fluids. The patient has known multi-level spondylosis affecting cervical and lumbar spine as well as mostly controlled sarcoid which she reports her PCP controls with oral steroids. She is on prednisone 5 mg daily and states that when she senses a flare she usual contacts her PCP who will generally order higher dose of oral steroids until the flare decreases. She reports that her PCP was out of town this time and she was instructed to present to the ED. Inflammatory markers obtained which sowed elevated sed rate 22 and crp 8.4. She is already taking methotrexate daily along with daily dose of prednisone 5 mg. She does not have regular contact with pulmonologist or rheumatology per her statement.     In early morning the patient reports that her numbness has completely resolved. She was administered IV steroid with intensions to increase her dose at discharge with standard taper down over time to her usual prednisone 5 mg daily. There is no leukocytosis or fever noted which is promising. She is comfortable appearing and does not appear in any acute distress. There is no adenopathy appreciated on exam and her 02 sat on room air remains a consistent 99% with clear lungs. Sentences are congruent without interruption. Seen and evaluated by neurology no new recommendations.    I suspect that the patient may be out of her pain medication (Vicodin) as her last RX shown on the [employer] website was noted to be 9/25/19 - ordered 60 pills monthly, however the patient reports to me that she is taking them three times daily. She already sees pain management and has appointment scheduled for next  week 10/23/19. This use may be necessary for the patients' increased pain, however, this increased narcotic usage is risky given her age and co morbidities. Suspect the extra doses of narcotics daily may be contributory to her report of increased fatigue and endurance, although not altogether the only contribution/explanation for her symptoms. The chronicity of her sarcoid and DJD is also significant to consider. Instructed on the dangers of not taking narcotics as RX'ed. Home on steroid taper (medrol dose pack) then resume her usual prednisone 5 mg daily. She has appropriate and adequate outpatient follow ups scheduled; On chart review patient has appointments with pain management 10/23/19, Cardiology 10/24/19, Chemo 10/25/19, ophthalmology 10/28/19, Hem/Onc 11/5/19, and pulmonology 11/21/19. Discharge to home in stable condition.    Will route DC summary to her PCP and other relevant stakeholders.         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    Specialty:  Internal Medicine  Why:  Call the office to schedule appointment, For outpatient follow-up/post  hospitalizaton  Contact information:  [place] [address]  [phone]                 Patient Instructions:      Diet Cardiac   Order Comments: See Stroke Patient Education Guide Booklet for details.     Diet Cardiac     Call 911 for any of the following:   Order Comments: Call 911  right away if any of the following warning signs come on suddenly, even if the symptoms only last for a few minutes. With stroke, timing is very important.   - Warning Signs of Stroke:  - Weakness: You may feel a sudden weakness, tingling or loss of feeling on one side of your face or body.  - Vision Problems: You may have sudden double vision or trouble seeing in one or both eyes.  - Speech Problems: You may have sudden trouble talking, slured speech, or problems understanding others.  - Headache: You may have sudden, severe headache.  - Movement Problems: You may experience dizziness, a feeling of spinning, a loss of balance, a feeling of falling or blackouts.     Activity as tolerated       Significant Diagnostic Studies: Labs:   CMP   Recent Labs   Lab 10/18/19  2018 10/19/19  0354    139   K 4.6 3.4*    103   CO2 27 25   * 123*   BUN 12 14   CREATININE 1.1 0.9   CALCIUM 10.2 9.7   PROT 7.7 7.1   ALBUMIN 4.4 4.1   BILITOT 0.4 0.5   ALKPHOS 65 59   AST 17 15   ALT 17 16   ANIONGAP 10 11   ESTGFRAFRICA 58* >60   EGFRNONAA 50* >60   , CBC   Recent Labs   Lab 10/18/19  2018 10/19/19  0354   WBC 5.12 4.58   HGB 11.3* 11.1*   HCT 34.8* 34.7*   * 371*   , INR   Lab Results   Component Value Date    INR 1.0 10/19/2019    INR 0.9 11/16/2015   , Lipid Panel   Lab Results   Component Value Date    CHOL 136 10/18/2019    HDL 54 10/18/2019    LDLCALC 47.2 (L) 10/18/2019    TRIG 174 (H) 10/18/2019    CHOLHDL 39.7 10/18/2019   , Troponin   Recent Labs   Lab 10/19/19  0354   TROPONINI <0.006    and A1C:   Recent Labs   Lab 09/20/19  1132   HGBA1C 6.2*        Medications:  Reconciled Home Medications:       Medication List      START taking these medications    methylPREDNISolone 4 mg tablet  Commonly known as:  MEDROL (GEORGI)  use as directed        CONTINUE taking these medications    ACCU-CHEK FASTCLIX LANCING DEV Kit  Generic drug:  lancing device with lancets  USE AS DIRECTED.     ACCU-CHEK SMARTVIEW TEST STRIP Strp  Generic drug:  blood sugar diagnostic  TEST  ONCE  A  DAY     ALCOHOL PREP PADS TOP     atorvastatin 20 MG tablet  Commonly known as:  LIPITOR  Take 1 tablet (20 mg total) by mouth once daily.     blood-glucose meter kit  Use as instructed     calcium carbonate 600 mg calcium (1,500 mg) Tab  Commonly known as:  OS-MALCOLM  Take 1 tablet by mouth 2 (two) times daily.     carvedilol 25 MG tablet  Commonly known as:  COREG  Take 1 tablet (25 mg total) by mouth 2 (two) times daily.     cloNIDine 0.3 MG tablet  Commonly known as:  CATAPRES  TAKE 1 TABLET BY MOUTH THREE TIMES DAILY     fenofibrate 160 MG Tab  Take 1 tablet (160 mg total) by mouth once daily.     FLUZONE HIGH-DOSE 2019-20 (PF) 180 mcg/0.5 mL Syrg  Generic drug:  flu vacc [id number](65yr up)PF  PHARMACIST ADMINISTERED IMMUNIZATION ADMINISTERED AT TIME OF DISPENSING     folic acid 1 MG tablet  Commonly known as:  FOLVITE  Take 1 tablet (1,000 mcg total) by mouth once daily.     gabapentin 400 MG capsule  Commonly known as:  NEURONTIN  Take 1 capsule (400 mg total) by mouth 3 (three) times daily.     HYDROcodone-acetaminophen 5-325 mg per tablet  Commonly known as:  NORCO  Take 1 tablet by mouth every 6 (six) hours as needed.     levothyroxine 50 MCG tablet  Commonly known as:  SYNTHROID  TAKE 1 TABLET BY MOUTH ONCE DAILY BEFORE BREAKFAST     metFORMIN 1000 MG tablet  Commonly known as:  GLUCOPHAGE  TAKE 1 TABLET BY MOUTH TWICE DAILY WITH MEALS     methotrexate 2.5 MG Tab  TAKE 6 TABLETS BY MOUTH EVERY 7 DAYS     NIFEdipine 30 MG (OSM) 24 hr tablet  Commonly known as:  PROCARDIA-XL  Take 1 tablet (30 mg total) by mouth once daily.     oxybutynin 5  MG Tr24  Commonly known as:  DITROPAN-XL  Take 1 tablet (5 mg total) by mouth once daily.     predniSONE 5 MG tablet  Commonly known as:  DELTASONE  Take 1 tablet (5 mg total) by mouth once daily.     PROCRIT INJ  Inject 1,000 Units as directed.     tiZANidine 2 MG tablet  Commonly known as:  ZANAFLEX  Take 2 tablets (4 mg total) by mouth every 8 (eight) hours as needed.        STOP taking these medications    doxycycline 100 MG Cap  Commonly known as:  VIBRAMYCIN            Indwelling Lines/Drains at time of discharge:   Lines/Drains/Airways     None                 Time spent on the discharge of patient: 40 minutes  Patient was seen and examined on the date of discharge and determined to be suitable for discharge.         SERGEY Solomon, FNP-C  Hospitalist - Department of Hospital Medicine  [address] [place] [address]  Office [phone]; Pager [phone]

## 2019-10-19 NOTE — HOSPITAL COURSE
Regino Lawrence is a  73 y.o. female very pleasant, who was placed in observation for left sided numbness. In the ED, EKG of sinus rhythm, CT head without acuet hemorrhage or large infarct, chest x-ray with clear lungs, troponin and BNP negative, UA without leukocytes or nitrites. MRI negative for acute stroke. She was noted to have mild dehydration with decreased GFR=58 which normalized with IV fluids. The patient has known multi-level spondylosis affecting cervical and lumbar spine as well as mostly controlled sarcoid which she reports her PCP controls with oral steroids. She is on prednisone 5 mg daily and states that when she senses a flare she usual contacts her PCP who will generally order higher dose of oral steroids until the flare decreases. She reports that her PCP was out of town this time and she was instructed to present to the ED. Inflammatory markers obtained which sowed elevated sed rate 22 and crp 8.4. She is already taking methotrexate daily along with daily dose of prednisone 5 mg. She does not have regular contact with pulmonologist or rheumatology per her statement.     In early morning the patient reports that her numbness has completely resolved. She was administered IV steroid with intensions to increase her dose at discharge with standard taper down over time to her usual prednisone 5 mg daily. There is no leukocytosis or fever noted which is promising. She is comfortable appearing and does not appear in any acute distress. There is no adenopathy appreciated on exam and her 02 sat on room air remains a consistent 99% with clear lungs. Sentences are congruent without interruption. Seen and evaluated by neurology no new recommendations.    I suspect that the patient may be out of her pain medication (Vicodin) as her last RX shown on the [facility] website was noted to be 9/25/19 - ordered 60 pills monthly, however the patient reports to me that she is taking them three times daily. She already  sees pain management and has appointment scheduled for next week 10/23/19. This use may be necessary for the patients' increased pain, however, this increased narcotic usage is risky given her age and co morbidities. Suspect the extra doses of narcotics daily may be contributory to her report of increased fatigue and endurance, although not altogether the only contribution/explanation for her symptoms. The chronicity of her sarcoid and DJD is also significant to consider. Instructed on the dangers of not taking narcotics as RX'ed. Home on steroid taper (medrol dose pack) then resume her usual prednisone 5 mg daily. She has appropriate and adequate outpatient follow ups scheduled; On chart review patient has appointments with pain management 10/23/19, Cardiology 10/24/19, Chemo 10/25/19, ophthalmology 10/28/19, Hem/Onc 11/5/19, and pulmonology 11/21/19. Discharge to home in stable condition.    Will route DC summary to her PCP and other relevant stakeholders.

## 2019-10-19 NOTE — PLAN OF CARE
"   10/19/19 1042   Final Note   Assessment Type Final Discharge Note   Anticipated Discharge Disposition Home   Hospital Follow Up  Appt(s) scheduled? Yes   Discharge plans and expectations educations in teach back method with documentation complete? Yes   Right Care Referral Info   Post Acute Recommendation No Care   EDUCATION:  Ms Lawrence's daughter, Laila  provided with educational information on TIA (per Dr Pruitt's direction).  Information reviewed and placed in :My Healthcare Packet" to be brought home for patient and daughter to use as resource after discharge.  Information included:  signs and symptoms to look for and call the doctor if experiencing, and symptoms that may indicate a medical emergency: CALL 911.      All questions answered.  Teach back method used.    Laila stated, "I will bring her to the hospital for weakness or tingling on one side of her body".        "

## 2019-10-19 NOTE — PROGRESS NOTES
[facility] CASE MANAGEMENT                  WRITTEN DISCHARGE INFORMATION      APPOINTMENTS AND RESOURCES TO HELP YOU MANAGE YOUR CARE AT HOME BASED ON YOUR PREFERENCES:  (If an appointment is not scheduled for you when you leave the hospital, call your doctor to schedule a follow up visit within a week)    Follow-up Information     Micaela Mendoza MD On 11/7/2019.    Specialty:  Internal Medicine  Why:  @10:40am For outpatient follow-up/post hospitalizaton  Contact information:  [address] [place] [address]  [phone]                   Healthy Living Instructions to HELP MANAGE YOUR CARE AT HOME:  Things You are responsible for:  1.    Getting your prescriptions filled   2.    Taking your medications as directed, DO NOT MISS ANY DOSES!  3.    Following the diet and exercise recommended by your doctor  4.    Going to your follow-up doctor appointment. This is important because it allows the doctor to monitor your progress and determine if any changes need to made to your treatment plan.  5. If you have any questions about MANAGING YOUR CARE AT HOME Call the Nurse Care Line for 24/7 Assistance [phone]       Please answer any calls you may receive from [facility]. We want to continue to support you as you manage your healthcare needs. [facility] is happy to have the opportunity to serve you.      Thank you for choosing [facility] for your healthcare needs!  Your [facility] Case Management Team,

## 2019-10-19 NOTE — CONSULTS
[facility]  Neurology  Consult Note    Patient Name: Regino Lawrence  MRN: [id number]  Admission Date: 10/18/2019  Hospital Length of Stay: 0 days  Code Status: Full Code   Attending Provider: Anne Rodriguez MD   Consulting Provider: Wilfrido Pruitt MD  Primary Care Physician: Micaela Mendoza MD  Principal Problem:Left sided numbness    Inpatient consult to Neurology  Consult performed by: Wilfrido Pruitt MD  Consult ordered by: Ashish Durbin PA-C        Subjective:     Chief Complaint: Left sided numbness     HPI: 74 y/o female with medical Hx as listed below comes to ED after due to a two-day Hx of left side numbness that seem to fluctuate and include left side of face/arm/leg. Pt states that symptoms have resolved. No associated visual or speech disturbances, vertigo, weakness. No previous episodes. No aggravating or alleviating factors. Pt tells me that she has been treated at ophthalmology clinic for a retinal vessel occlusion.      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, without long-term current use of insulin     Depression     Diabetes mellitus, type 2     Eye injury as a child     k-abrasion  od    Hyperlipidemia     Hypertension     Hypothyroidism     Immune deficiency disorder     Immune disorder     Myalgia and myositis 9/6/2012    Osteoporosis     Polyneuropathy     Renal manifestation of secondary diabetes mellitus     Sarcoidosis     Ulcer     no cancer    Urinary incontinence        Past Surgical History:   Procedure Laterality Date    CARPAL TUNNEL RELEASE      Rt wrist    CATARACT EXTRACTION W/  INTRAOCULAR LENS IMPLANT Right 4/30/2015    Dr. Azevedo     CATARACT EXTRACTION W/  INTRAOCULAR LENS IMPLANT Left 2015    Dr. Azevedo     SECTION      CHOLECYSTECTOMY      INJECTION OF JOINT Left 3/21/2019    Procedure: Injection, Joint  fLUOROSCOPIC jOINT iNJECTION (hIP iNJECTION) LEFT ROCH BURSA AS WELL LEFT TROCHANTERIC BURSA;  Surgeon: Alfonso Richards MD;  Location: [facility];  Service: Pain Management;  Laterality: Left;  NEEDS CONSENT, DIABETIC    INJECTION OF JOINT Left 2019    Procedure: Injection, Joint FLUOROSCOPIC JOINT INJECTION (HIP INJECTION) LEFT HIP;  Surgeon: Alfonso Richards MD;  Location: [facility];  Service: Pain Management;  Laterality: Left;  NEEDS CONSENT    INJECTION OF JOINT Left 2019    Procedure: INJECTION, JOINT;  Surgeon: Alfonso Richards MD;  Location: [facility];  Service: Pain Management;  Laterality: Left;  Left Hip and Left GTB Injections    TUBAL LIGATION         Review of patient's allergies indicates:   Allergen Reactions    Azathioprine Shortness Of Breath and Other (See Comments)     Fatigue       Current Neurological Medications:     No current facility-administered medications on file prior to encounter.      Current Outpatient Medications on File Prior to Encounter   Medication Sig    ACCU-CHEK FASTCLIX LANCING DEV Kit USE AS DIRECTED.    ACCU-CHEK SMARTVIEW TEST STRIP Strp TEST  ONCE  A  DAY    ALCOHOL ANTISEPTIC PADS (ALCOHOL PREP PADS TOP)     atorvastatin (LIPITOR) 20 MG tablet Take 1 tablet (20 mg total) by mouth once daily.    blood-glucose meter kit Use as instructed    calcium carbonate (OS-MALCOLM) 600 mg calcium (1,500 mg) Tab Take 1 tablet by mouth 2 (two) times daily.     carvedilol (COREG) 25 MG tablet Take 1 tablet (25 mg total) by mouth 2 (two) times daily.    cloNIDine (CATAPRES) 0.3 MG tablet TAKE 1 TABLET BY MOUTH THREE TIMES DAILY    epoetin german (PROCRIT INJ) Inject 1,000 Units as directed.    fenofibrate 160 MG Tab Take 1 tablet (160 mg total) by mouth once daily.     FLUZONE HIGH-DOSE 2019-20, PF, 180 mcg/0.5 mL Syrg PHARMACIST ADMINISTERED IMMUNIZATION ADMINISTERED AT TIME OF DISPENSING    folic acid (FOLVITE) 1 MG tablet Take 1 tablet (1,000 mcg total) by mouth once daily.    gabapentin (NEURONTIN) 400 MG capsule Take 1 capsule (400 mg total) by mouth 3 (three) times daily.    HYDROcodone-acetaminophen (NORCO) 5-325 mg per tablet Take 1 tablet by mouth every 6 (six) hours as needed.    levothyroxine (SYNTHROID) 50 MCG tablet TAKE 1 TABLET BY MOUTH ONCE DAILY BEFORE BREAKFAST    metFORMIN (GLUCOPHAGE) 1000 MG tablet TAKE 1 TABLET BY MOUTH TWICE DAILY WITH MEALS    methotrexate 2.5 MG Tab TAKE 6 TABLETS BY MOUTH EVERY 7 DAYS    NIFEdipine (PROCARDIA-XL) 30 MG (OSM) 24 hr tablet Take 1 tablet (30 mg total) by mouth once daily.    oxybutynin (DITROPAN-XL) 5 MG TR24 Take 1 tablet (5 mg total) by mouth once daily.    predniSONE (DELTASONE) 5 MG tablet Take 1 tablet (5 mg total) by mouth once daily.    tiZANidine (ZANAFLEX) 2 MG tablet Take 2 tablets (4 mg total) by mouth every 8 (eight) hours as needed.      Family History     Problem Relation (Age of Onset)    Arthritis Sister    Cancer Sister    Cataracts Mother    Diabetes Son    Glaucoma Sister    Hepatitis Sister    Hypertension Mother, Maternal Grandmother, Son    Immunodeficiency Sister    Kidney failure Sister    No Known Problems Father, Brother, Maternal Aunt, Maternal Uncle, Paternal Aunt, Paternal Uncle, Maternal Grandfather, Paternal Grandmother, Paternal Grandfather        Tobacco Use    Smoking status: Never Smoker    Smokeless tobacco: Never Used   Substance and Sexual Activity    Alcohol use: No    Drug use: No    Sexual activity: Not Currently     Partners: Male     Review of Systems   Constitutional: Negative for fever.   HENT: Negative for trouble swallowing.    Eyes: Negative for photophobia.   Respiratory: Negative for chest tightness.    Cardiovascular: Negative for chest pain.    Gastrointestinal: Negative for abdominal pain.   Genitourinary: Negative for dysuria.   Musculoskeletal: Negative for back pain.   Neurological: Negative for headaches.          Objective:     Vital Signs (Most Recent):  Temp: 97.5 °F (36.4 °C) (10/19/19 0706)  Pulse: 85 (10/19/19 0706)  Resp: 19 (10/19/19 0706)  BP: (!) 156/81 (10/19/19 0706)  SpO2: 99 % (10/19/19 0755) Vital Signs (24h Range):  Temp:  [97.2 °F (36.2 °C)-97.9 °F (36.6 °C)] 97.5 °F (36.4 °C)  Pulse:  [] 85  Resp:  [18-22] 19  SpO2:  [97 %-100 %] 99 %  BP: (156-192)/(79-95) 156/81     Weight: 55.5 kg (122 lb 5.7 oz)  Body mass index is 23.12 kg/m².    Physical Exam   Constitutional: She is oriented to person, place, and time. No distress.   HENT:   Head: Normocephalic.   Eyes: Right eye exhibits no discharge. Left eye exhibits no discharge.   Neck: Normal range of motion.   Cardiovascular: Regular rhythm.   Pulmonary/Chest: Breath sounds normal.   Abdominal: Soft.   Musculoskeletal: She exhibits no tenderness.   Neurological: She is oriented to person, place, and time. She has normal strength. She has a normal Finger-Nose-Finger Test.   Skin: She is not diaphoretic.   Psychiatric: Her speech is normal.       NEUROLOGICAL EXAMINATION:     MENTAL STATUS   Oriented to person, place, and time.   Speech: speech is normal   Level of consciousness: alert    CRANIAL NERVES     CN III, IV, VI   Right pupil: Size: 2 mm. Shape: regular. Consensual response: intact.   Left pupil: Size: 2 mm. Shape: regular. Consensual response: intact.   Nystagmus: none   Ophthalmoparesis: none  Conjugate gaze: present    CN VII   Right facial weakness: none  Left facial weakness: none    CN IX, X   Palate: symmetric    CN XI   Right trapezius strength: normal  Left trapezius strength: normal    CN XII   Tongue deviation: none    MOTOR EXAM     Strength   Strength 5/5 throughout.     SENSORY EXAM   Right arm light touch: normal  Left arm light touch: normal  Right leg  light touch: normal  Left leg light touch: normal    GAIT AND COORDINATION      Coordination   Finger to nose coordination: normal      Significant Labs:   CBC:   Recent Labs   Lab 10/18/19  2018 10/19/19  0354   WBC 5.12 4.58   HGB 11.3* 11.1*   HCT 34.8* 34.7*   * 371*     CMP:   Recent Labs   Lab 10/18/19  2018 10/19/19  0354   * 123*    139   K 4.6 3.4*    103   CO2 27 25   BUN 12 14   CREATININE 1.1 0.9   CALCIUM 10.2 9.7   MG  --  1.5*   PROT 7.7 7.1   ALBUMIN 4.4 4.1   BILITOT 0.4 0.5   ALKPHOS 65 59   AST 17 15   ALT 17 16   ANIONGAP 10 11   EGFRNONAA 50* >60     LIPIDS/LFTS:  Recent Labs   Lab 05/08/17  0950 03/12/19  1107 10/18/19  2018   Cholesterol 167 176 136   Triglycerides 84 153 H 174 H   HDL 60 55 54   LDL Cholesterol 90.2 90.4 47.2 L   Non-HDL Cholesterol 107 121 82         Significant Imaging:  MRI   Impression       No evidence of acute infarction.    Changes of chronic small vessel ischemic disease and cerebral volume loss.    Old microhemorrhage in the left cerebellum.      Electronically signed by: Julio Mitchell MD  Date: 10/18/2019  Time: 21:34         Assessment and Plan:     74 y/o female consulted for left sided numbness    1. Left sided numbness: TIA? Pt has risk factors for stroke/TIA. Treated for CRVO.   No focal findings on exam. MRI shows no stroke.   -ASA 81 mg daily. Continue statin.   -OK to D/C home.          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, without long-term current use of insulin [E11.3292] 10/27/2014 Yes     Chronic    Combined 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  [E11.69, E78.2] 07/25/2013 Yes     Chronic    Essential hypertension [I10] 07/25/2013 Yes     Chronic    Hypothyroidism [E03.9] 01/07/2013 Yes     Chronic      Problems Resolved During this Admission:       VTE Risk Mitigation (From admission, onward)         Ordered     enoxaparin injection 40 mg  Daily      10/19/19 0637     Reason for No Pharmacological VTE Prophylaxis  Once      10/18/19 2000     IP VTE HIGH RISK PATIENT  Once      10/18/19 2000                Thank you for your consult. I will follow-up with patient. Please contact us if you have any additional questions.    Wilfrido Pruitt MD  Neurology  [facility]

## 2019-10-19 NOTE — PLAN OF CARE
10/19/19 0945   Discharge Assessment   Assessment Type Discharge Planning Assessment   Confirmed/corrected address and phone number on facesheet? Yes   Assessment information obtained from? Patient   Expected Length of Stay (days)   (discharged)   Communicated expected length of stay with patient/caregiver yes   Prior to hospitilization cognitive status: Alert/Oriented   Prior to hospitalization functional status: Assistive Equipment   Current cognitive status: Alert/Oriented   Current Functional Status: Needs Assistance   Lives With spouse;child(donaldo), adult   Able to Return to Prior Arrangements yes   Is patient able to care for self after discharge? Yes   Who are your caregiver(s) and their phone number(s)? daughter will help at home   Patient's perception of discharge disposition home or selfcare   Readmission Within the Last 30 Days no previous admission in last 30 days   Patient currently being followed by outpatient case management? No   Patient currently receives any other outside agency services? No   Equipment Currently Used at Home rollator;power chair;cane, quad   Do you have any problems affording any of your prescribed medications? No   Is the patient taking medications as prescribed? yes   Does the patient have transportation home? Yes   Transportation Anticipated family or friend will provide   Does the patient receive services at the Coumadin Clinic? No   Discharge Plan A Home with family   DME Needed Upon Discharge  none   Patient/Family in Agreement with Plan yes   Does the patient have transportation to healthcare appointments? Yes

## 2019-10-19 NOTE — H&P
[facility] Medicine  History & Physical    Patient Name: Regino Lawrence  MRN: [id number]  Admission Date: 10/18/2019  Attending Physician: Anne Rodriguez MD   Primary Care Provider: Micaela Mendoza MD         Patient information was obtained from patient, past medical records and ER records.     Subjective:     Principal Problem:Left sided numbness    Chief Complaint:   Chief Complaint   Patient presents with    Numbness     pt states that last night she started with left sided face tingling that goes down to the left shoulder.     Shortness of Breath     pt states that she has been having sob since august and was seen for it but the sob is not getting any better.         HPI: Regino Lawrence 73 y.o. female with sarcoidosis, HTN, HLD, DM2, hypothyroid presents to the hospital with a chief complaint of left sided numbness. She reports last night she awoke with left sided numbness initially on the left side of her face but then extended to left arm and leg. The left arm and leg have since improved, but she still reports left sided facial numbness. She denies any weakness on her left side, she denies headache, vision changes, slurred speech, difficulty swallowing, chest pain, dysuria, dizziness.  Her left side is slightly weaker at baseline due to arthritis, and she is unsure if there is new weakness there today. She has had an MRI in the past, that she reports required medication to sedate due to claustrophobia.    She also reports SOB and dyspnea on exertion for the last 2 months. She has been seen by her PCP for this and was instructed to present to ED for CTA. The CTA was negative for PE. She has also seen a cardiologist. Her echo/stress test in 4/2019 were with EF of 55% and no ischemia. She denies any leg swelling associated with this. The SOB has been progressive for the last 2 months. It now makes it difficult to ambulate. She believes her SOB may be related to sarcoidosis. Her  sarcoidosis previously manifested as arthralgias and myalgias.     In the ED, EKG of sinus rhythm, CT head without acuet hemorrhage or large infarct, chest x-ray with clear lungs, troponin and BNP negative, UA without leukocytes or nitrites.     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, without long-term current use of insulin     Depression     Diabetes mellitus, type 2     Eye injury as a child     k-abrasion  od    Hyperlipidemia     Hypertension     Hypothyroidism     Immune deficiency disorder     Immune disorder     Myalgia and myositis 2012    Osteoporosis     Polyneuropathy     Renal manifestation of secondary diabetes mellitus     Sarcoidosis     Ulcer     no cancer    Urinary incontinence        Past Surgical History:   Procedure Laterality Date    CARPAL TUNNEL RELEASE      Rt wrist    CATARACT EXTRACTION W/  INTRAOCULAR LENS IMPLANT Right 2015    Dr. Azevedo    CATARACT EXTRACTION W/  INTRAOCULAR LENS IMPLANT Left 2015    Dr. Azevedo     SECTION      CHOLECYSTECTOMY      INJECTION OF JOINT Left 3/21/2019    Procedure: Injection, Joint  fLUOROSCOPIC jOINT iNJECTION (hIP iNJECTION) LEFT ROCH BURSA AS WELL LEFT TROCHANTERIC BURSA;  Surgeon: Alfonso Richards MD;  [facility];  Service: Pain Management;  Laterality: Left;  NEEDS CONSENT, DIABETIC    INJECTION OF JOINT Left 2019    Procedure: Injection, Joint FLUOROSCOPIC JOINT INJECTION (HIP INJECTION) LEFT HIP;  Surgeon: Alfonso Richards MD;  Location: [facility];  Service: Pain Management;  Laterality: Left;  NEEDS CONSENT    INJECTION OF JOINT Left 2019    Procedure: INJECTION,  JOINT;  Surgeon: Alfonso Richards MD;  Location: [facility];  Service: Pain Management;  Laterality: Left;  Left Hip and Left GTB Injections    TUBAL LIGATION         Review of patient's allergies indicates:   Allergen Reactions    Azathioprine Shortness Of Breath and Other (See Comments)     Fatigue       No current facility-administered medications on file prior to encounter.      Current Outpatient Medications on File Prior to Encounter   Medication Sig    ACCU-CHEK FASTCLIX LANCING DEV Kit USE AS DIRECTED.    ACCU-CHEK SMARTVIEW TEST STRIP Strp TEST  ONCE  A  DAY    ALCOHOL ANTISEPTIC PADS (ALCOHOL PREP PADS TOP)     atorvastatin (LIPITOR) 20 MG tablet Take 1 tablet (20 mg total) by mouth once daily.    blood-glucose meter kit Use as instructed    calcium carbonate (OS-MALCOLM) 600 mg calcium (1,500 mg) Tab Take 1 tablet by mouth 2 (two) times daily.     carvedilol (COREG) 25 MG tablet Take 1 tablet (25 mg total) by mouth 2 (two) times daily.    cloNIDine (CATAPRES) 0.3 MG tablet TAKE 1 TABLET BY MOUTH THREE TIMES DAILY    epoetin german (PROCRIT INJ) Inject 1,000 Units as directed.    fenofibrate 160 MG Tab Take 1 tablet (160 mg total) by mouth once daily.    FLUZONE HIGH-DOSE 2019-20, PF, 180 mcg/0.5 mL Syrg PHARMACIST ADMINISTERED IMMUNIZATION ADMINISTERED AT TIME OF DISPENSING    folic acid (FOLVITE) 1 MG tablet Take 1 tablet (1,000 mcg total) by mouth once daily.    gabapentin (NEURONTIN) 400 MG capsule Take 1 capsule (400 mg total) by mouth 3 (three) times daily.    HYDROcodone-acetaminophen (NORCO) 5-325 mg per tablet Take 1 tablet by mouth every 6 (six) hours as needed.    levothyroxine (SYNTHROID) 50 MCG tablet TAKE 1 TABLET BY MOUTH ONCE DAILY BEFORE BREAKFAST    metFORMIN (GLUCOPHAGE) 1000 MG tablet TAKE 1 TABLET BY MOUTH TWICE DAILY WITH MEALS    methotrexate 2.5 MG Tab TAKE 6 TABLETS BY MOUTH EVERY 7 DAYS    NIFEdipine (PROCARDIA-XL) 30 MG (OSM) 24 hr tablet Take 1 tablet (30 mg total)  by mouth once daily.    oxybutynin (DITROPAN-XL) 5 MG TR24 Take 1 tablet (5 mg total) by mouth once daily.    predniSONE (DELTASONE) 5 MG tablet Take 1 tablet (5 mg total) by mouth once daily.    tiZANidine (ZANAFLEX) 2 MG tablet Take 2 tablets (4 mg total) by mouth every 8 (eight) hours as needed.    [DISCONTINUED] doxycycline (VIBRAMYCIN) 100 MG Cap Take 1 capsule (100 mg total) by mouth every 12 (twelve) hours. (Patient not taking: Reported on 10/16/2019)     Family History     Problem Relation (Age of Onset)    Arthritis Sister    Cancer Sister    Cataracts Mother    Diabetes Son    Glaucoma Sister    Hepatitis Sister    Hypertension Mother, Maternal Grandmother, Son    Immunodeficiency Sister    Kidney failure Sister    No Known Problems Father, Brother, Maternal Aunt, Maternal Uncle, Paternal Aunt, Paternal Uncle, Maternal Grandfather, Paternal Grandmother, Paternal Grandfather        Tobacco Use    Smoking status: Never Smoker    Smokeless tobacco: Never Used   Substance and Sexual Activity    Alcohol use: No    Drug use: No    Sexual activity: Not Currently     Partners: Male     Review of Systems   Constitutional: Negative for chills and fever.   HENT: Negative for nosebleeds and tinnitus.    Eyes: Negative for photophobia and visual disturbance.   Respiratory: Positive for shortness of breath (2 months). Negative for wheezing.    Cardiovascular: Negative for chest pain, palpitations and leg swelling.   Gastrointestinal: Negative for abdominal distention, nausea and vomiting.   Genitourinary: Negative for dysuria, flank pain and hematuria.   Musculoskeletal: Negative for gait problem and joint swelling.   Skin: Negative for rash and wound.   Neurological: Positive for numbness (left sided). Negative for seizures, syncope, facial asymmetry and speech difficulty.     Objective:     Vital Signs (Most Recent):  Temp: 97.5 °F (36.4 °C) (10/18/19 1949)  Pulse: 96 (10/18/19 1949)  Resp: 18 (10/18/19  1949)  BP: (!) 192/95 (10/18/19 1949)  SpO2: 100 % (10/18/19 1949) Vital Signs (24h Range):  Temp:  [97.5 °F (36.4 °C)-97.9 °F (36.6 °C)] 97.5 °F (36.4 °C)  Pulse:  [] 96  Resp:  [18-22] 18  SpO2:  [98 %-100 %] 100 %  BP: (174-192)/(79-95) 192/95     Weight: 51.7 kg (113 lb 15.7 oz)  Body mass index is 21.54 kg/m².    Physical Exam   Constitutional: She is oriented to person, place, and time. She appears well-developed and well-nourished. No distress.   HENT:   Head: Normocephalic and atraumatic.   Right Ear: External ear normal.   Left Ear: External ear normal.   Eyes: Conjunctivae and EOM are normal. Right eye exhibits no discharge. Left eye exhibits no discharge.   Neck: Normal range of motion. No thyromegaly present.   Cardiovascular: Normal rate and regular rhythm.   No murmur heard.  Pulmonary/Chest: Effort normal and breath sounds normal. No respiratory distress.   Abdominal: Soft. Bowel sounds are normal. She exhibits no distension and no mass. There is no tenderness.   Musculoskeletal: She exhibits no edema or deformity.   Neurological: She is alert and oriented to person, place, and time. A sensory deficit (reports altered sensation left side of face) is present.   4/5 strength left side hand , 5/5 on right. Negative romberg, finger to nose intact, difficulty performing heel to shin, baseline per patient.    Skin: Skin is warm and dry.   Psychiatric: She has a normal mood and affect. Her behavior is normal.   Nursing note and vitals reviewed.        CRANIAL NERVES     CN III, IV, VI   Extraocular motions are normal.        Significant Labs:   CBC:   Recent Labs   Lab 10/18/19  2018   WBC 5.12   HGB 11.3*   HCT 34.8*   *     CMP:   Recent Labs   Lab 10/18/19  2018      K 4.6      CO2 27   *   BUN 12   CREATININE 1.1   CALCIUM 10.2   PROT 7.7   ALBUMIN 4.4   BILITOT 0.4   ALKPHOS 65   AST 17   ALT 17   ANIONGAP 10   EGFRNONAA 50*     Cardiac Markers: No results for  input(s): CKMB, MYOGLOBIN, BNP, TROPISTAT in the last 48 hours.  Troponin: No results for input(s): TROPONINI in the last 48 hours.    Significant Imaging:   Imaging Results          CT Head Without Contrast (Final result)  Result time 10/18/19 16:35:09    Final result by Juan Tan DO (10/18/19 16:35:09)                 Impression:      Mild generalized cerebral volume loss with mild patchy hypoattenuation supratentorial white matter mild while overall nonspecific most suggestive for mild degree of chronic ischemic change.    Subcentimeter hypodensity right basal ganglia suggestive for remote lacunar-type infarct.    No evidence for acute intracranial hemorrhage or sulcal effacement to suggest large territory recent infarction.  Clinical correlation and further evaluation as warranted.      Electronically signed by: Juan Tan DO  Date:    10/18/2019  Time:    16:35             Narrative:    EXAMINATION:  CT HEAD WITHOUT CONTRAST    CLINICAL HISTORY:  Focal neuro deficit, new, fixed or worsening, >6 hours;    TECHNIQUE:  Multiple sequential 5 mm axial images of the head without contrast.  Coronal and sagittal reformatted imaging from the axial acquisition.    COMPARISON:  None    FINDINGS:  Generalized cerebral volume loss with compensatory enlargement ventricles sulci and cisterns without hydrocephalus.  There is a subcentimeter ovoid focus of hypoattenuation in the right basal ganglia most compatible with remote lacunar-type infarct.  Mild patchy hypoattenuation supratentorial white matter which is nonspecific and suggestive for mild chronic ischemic change.  There is no evidence for acute intracranial hemorrhage or sulcal effacement to suggest large territory recent infarction.  The visualized paranasal sinuses and mastoid air cells are clear.  Operative change from bilateral cataract repair.                               X-Ray Chest PA And Lateral (Final result)  Result time 10/18/19 16:32:21    Final  result by Kayce Castellanos MD (10/18/19 16:32:21)                 Impression:      Clear lungs.      Electronically signed by: Kayce Castellanos MD  Date:    10/18/2019  Time:    16:32             Narrative:    EXAMINATION:  XR CHEST PA AND LATERAL    CLINICAL HISTORY:  Shortness of breath    TECHNIQUE:  PA and lateral views of the chest were performed.    COMPARISON:  Prior dated 09/20/2019    FINDINGS:  The mediastinal structures are midline.  The cardiac silhouette is not enlarged.  There is no evidence of acute pulmonary disease, pleural disease, lymph node enlargement, or cardiac decompensation.    There is fusion hardware noted at the lower cervical spine.  There is degenerative change of the shoulders.                                  Assessment/Plan:     Left sided numbness  18 hours ago patient began to experience left sided numbness. Symptoms currently present on left side of face. CT head without acute hemorrhage/infarct.   -MRI/Carotid US/Echo pending  -Neurology consulted  -Aspirin/statin  -neuro checks, fall precaution, aspiration precaution  -nursing swallow assessment  -PT/SLP  -TSH/Lipid pending  -Telemetry      Dyspnea on exertion  Unclear as to cause. Symptoms ongoing for 2 months.  BNP negative, troponin negative, CTA negative 1 month ago for PE, chest x-ray without acute process in lungs, Echo in 4/2019 with EF of 55% and grade 1 diastolic dysfunction. Possibly related to worsening sarcoid.   Will consult pulmonology   Repeat echo       Anemia in CKD (chronic kidney disease)  H&H 10 and 31 today. No indication for transfusion at this time. Will continue to monitor.    Sarcoidosis  Possibly related to worsening dyspnea. Will continue home prednisone, see above. Not due to methotrexate until Monday.      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 Will start sliding scale insulin, diabetic diet, POCT glucose checks. Goal -180      Essential hypertension  Poorly controlled holding home clonidine, coreg, and procardia until results of MRI. PRN clonidine      Combined 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  Continue home statin      Hypothyroidism  Continue home synthroid repeat TSH pending        VTE Risk Mitigation (From admission, onward)         Ordered     Place HAYDEN hose  Until discontinued      10/18/19 2000     Reason for No Pharmacological VTE Prophylaxis  Once      10/18/19 2000     IP VTE HIGH RISK PATIENT  Once      10/18/19 2000     Place sequential compression device  Until discontinued      10/18/19 2000     Place HAYDEN hose  Until discontinued      10/18/19 1932              VTE: HAYDEN/SCD  Code: Full  Diet: Diabetic  Dispo: pending MRI and neurology monica Durbin PA-C  Department of Hospital Medicine   [facility]

## 2019-10-19 NOTE — ASSESSMENT & PLAN NOTE
18 hours ago patient began to experience left sided numbness. Symptoms currently present on left side of face. CT head without acute hemorrhage/infarct.   -MRI/Carotid US/Echo pending  -Neurology consulted  -Aspirin/statin  -neuro checks, fall precaution, aspiration precaution  -nursing swallow assessment  -PT/SLP  -TSH/Lipid pending  -Telemetry

## 2019-10-19 NOTE — ASSESSMENT & PLAN NOTE
Lab Results   Component Value Date    HGBA1C 6.2 (H) 09/20/2019     BG of 188 on admit. Will start sliding scale insulin, diabetic diet, POCT glucose checks. Goal -180

## 2019-10-19 NOTE — SUBJECTIVE & OBJECTIVE
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, without long-term current use of insulin     Depression     Diabetes mellitus, type 2     Eye injury as a child     k-abrasion  od    Hyperlipidemia     Hypertension     Hypothyroidism     Immune deficiency disorder     Immune disorder     Myalgia and myositis 2012    Osteoporosis     Polyneuropathy     Renal manifestation of secondary diabetes mellitus     Sarcoidosis     Ulcer     no cancer    Urinary incontinence        Past Surgical History:   Procedure Laterality Date    CARPAL TUNNEL RELEASE      Rt wrist    CATARACT EXTRACTION W/  INTRAOCULAR LENS IMPLANT Right 2015    Dr. Azevedo    CATARACT EXTRACTION W/  INTRAOCULAR LENS IMPLANT Left 2015    Dr. Azevedo     SECTION      CHOLECYSTECTOMY      INJECTION OF JOINT Left 3/21/2019    Procedure: Injection, Joint  fLUOROSCOPIC jOINT iNJECTION (hIP iNJECTION) LEFT ROCH BURSA AS WELL LEFT TROCHANTERIC BURSA;  Surgeon: Alfonso Richards MD;  Location: [facility];  Service: Pain Management;  Laterality: Left;  NEEDS CONSENT, DIABETIC    INJECTION OF JOINT Left 2019    Procedure: Injection, Joint FLUOROSCOPIC JOINT INJECTION (HIP INJECTION) LEFT HIP;  Surgeon: Alfonso Richards MD;  Location: [facility];  Service: Pain Management;  Laterality: Left;  NEEDS CONSENT    INJECTION OF JOINT Left 2019    Procedure: INJECTION, JOINT;  Surgeon: Alfonso Richards MD;  Location: [facility];  Service: Pain Management;  Laterality: Left;  Left Hip and Left GTB Injections    TUBAL LIGATION         Review of patient's allergies indicates:   Allergen Reactions    Azathioprine  Shortness Of Breath and Other (See Comments)     Fatigue       No current facility-administered medications on file prior to encounter.      Current Outpatient Medications on File Prior to Encounter   Medication Sig    ACCU-CHEK FASTCLIX LANCING DEV Kit USE AS DIRECTED.    ACCU-CHEK SMARTVIEW TEST STRIP Strp TEST  ONCE  A  DAY    ALCOHOL ANTISEPTIC PADS (ALCOHOL PREP PADS TOP)     atorvastatin (LIPITOR) 20 MG tablet Take 1 tablet (20 mg total) by mouth once daily.    blood-glucose meter kit Use as instructed    calcium carbonate (OS-MALCOLM) 600 mg calcium (1,500 mg) Tab Take 1 tablet by mouth 2 (two) times daily.     carvedilol (COREG) 25 MG tablet Take 1 tablet (25 mg total) by mouth 2 (two) times daily.    cloNIDine (CATAPRES) 0.3 MG tablet TAKE 1 TABLET BY MOUTH THREE TIMES DAILY    epoetin german (PROCRIT INJ) Inject 1,000 Units as directed.    fenofibrate 160 MG Tab Take 1 tablet (160 mg total) by mouth once daily.    FLUZONE HIGH-DOSE 2019-20, PF, 180 mcg/0.5 mL Syrg PHARMACIST ADMINISTERED IMMUNIZATION ADMINISTERED AT TIME OF DISPENSING    folic acid (FOLVITE) 1 MG tablet Take 1 tablet (1,000 mcg total) by mouth once daily.    gabapentin (NEURONTIN) 400 MG capsule Take 1 capsule (400 mg total) by mouth 3 (three) times daily.    HYDROcodone-acetaminophen (NORCO) 5-325 mg per tablet Take 1 tablet by mouth every 6 (six) hours as needed.    levothyroxine (SYNTHROID) 50 MCG tablet TAKE 1 TABLET BY MOUTH ONCE DAILY BEFORE BREAKFAST    metFORMIN (GLUCOPHAGE) 1000 MG tablet TAKE 1 TABLET BY MOUTH TWICE DAILY WITH MEALS    methotrexate 2.5 MG Tab TAKE 6 TABLETS BY MOUTH EVERY 7 DAYS    NIFEdipine (PROCARDIA-XL) 30 MG (OSM) 24 hr tablet Take 1 tablet (30 mg total) by mouth once daily.    oxybutynin (DITROPAN-XL) 5 MG TR24 Take 1 tablet (5 mg total) by mouth once daily.    predniSONE (DELTASONE) 5 MG tablet Take 1 tablet (5 mg total) by mouth once daily.    tiZANidine (ZANAFLEX) 2 MG tablet Take 2  tablets (4 mg total) by mouth every 8 (eight) hours as needed.    [DISCONTINUED] doxycycline (VIBRAMYCIN) 100 MG Cap Take 1 capsule (100 mg total) by mouth every 12 (twelve) hours. (Patient not taking: Reported on 10/16/2019)     Family History     Problem Relation (Age of Onset)    Arthritis Sister    Cancer Sister    Cataracts Mother    Diabetes Son    Glaucoma Sister    Hepatitis Sister    Hypertension Mother, Maternal Grandmother, Son    Immunodeficiency Sister    Kidney failure Sister    No Known Problems Father, Brother, Maternal Aunt, Maternal Uncle, Paternal Aunt, Paternal Uncle, Maternal Grandfather, Paternal Grandmother, Paternal Grandfather        Tobacco Use    Smoking status: Never Smoker    Smokeless tobacco: Never Used   Substance and Sexual Activity    Alcohol use: No    Drug use: No    Sexual activity: Not Currently     Partners: Male     Review of Systems   Constitutional: Negative for chills and fever.   HENT: Negative for nosebleeds and tinnitus.    Eyes: Negative for photophobia and visual disturbance.   Respiratory: Positive for shortness of breath (2 months). Negative for wheezing.    Cardiovascular: Negative for chest pain, palpitations and leg swelling.   Gastrointestinal: Negative for abdominal distention, nausea and vomiting.   Genitourinary: Negative for dysuria, flank pain and hematuria.   Musculoskeletal: Negative for gait problem and joint swelling.   Skin: Negative for rash and wound.   Neurological: Positive for numbness (left sided). Negative for seizures, syncope, facial asymmetry and speech difficulty.     Objective:     Vital Signs (Most Recent):  Temp: 97.5 °F (36.4 °C) (10/18/19 1949)  Pulse: 96 (10/18/19 1949)  Resp: 18 (10/18/19 1949)  BP: (!) 192/95 (10/18/19 1949)  SpO2: 100 % (10/18/19 1949) Vital Signs (24h Range):  Temp:  [97.5 °F (36.4 °C)-97.9 °F (36.6 °C)] 97.5 °F (36.4 °C)  Pulse:  [] 96  Resp:  [18-22] 18  SpO2:  [98 %-100 %] 100 %  BP: (174-192)/(79-95)  192/95     Weight: 51.7 kg (113 lb 15.7 oz)  Body mass index is 21.54 kg/m².    Physical Exam   Constitutional: She is oriented to person, place, and time. She appears well-developed and well-nourished. No distress.   HENT:   Head: Normocephalic and atraumatic.   Right Ear: External ear normal.   Left Ear: External ear normal.   Eyes: Conjunctivae and EOM are normal. Right eye exhibits no discharge. Left eye exhibits no discharge.   Neck: Normal range of motion. No thyromegaly present.   Cardiovascular: Normal rate and regular rhythm.   No murmur heard.  Pulmonary/Chest: Effort normal and breath sounds normal. No respiratory distress.   Abdominal: Soft. Bowel sounds are normal. She exhibits no distension and no mass. There is no tenderness.   Musculoskeletal: She exhibits no edema or deformity.   Neurological: She is alert and oriented to person, place, and time. A sensory deficit (reports altered sensation left side of face) is present.   4/5 strength left side hand , 5/5 on right. Negative romberg, finger to nose intact, difficulty performing heel to shin, baseline per patient.    Skin: Skin is warm and dry.   Psychiatric: She has a normal mood and affect. Her behavior is normal.   Nursing note and vitals reviewed.        CRANIAL NERVES     CN III, IV, VI   Extraocular motions are normal.        Significant Labs:   CBC:   Recent Labs   Lab 10/18/19  2018   WBC 5.12   HGB 11.3*   HCT 34.8*   *     CMP:   Recent Labs   Lab 10/18/19  2018      K 4.6      CO2 27   *   BUN 12   CREATININE 1.1   CALCIUM 10.2   PROT 7.7   ALBUMIN 4.4   BILITOT 0.4   ALKPHOS 65   AST 17   ALT 17   ANIONGAP 10   EGFRNONAA 50*     Cardiac Markers: No results for input(s): CKMB, MYOGLOBIN, BNP, TROPISTAT in the last 48 hours.  Troponin: No results for input(s): TROPONINI in the last 48 hours.    Significant Imaging:   Imaging Results          CT Head Without Contrast (Final result)  Result time 10/18/19 16:35:09     Final result by Juan Tan DO (10/18/19 16:35:09)                 Impression:      Mild generalized cerebral volume loss with mild patchy hypoattenuation supratentorial white matter mild while overall nonspecific most suggestive for mild degree of chronic ischemic change.    Subcentimeter hypodensity right basal ganglia suggestive for remote lacunar-type infarct.    No evidence for acute intracranial hemorrhage or sulcal effacement to suggest large territory recent infarction.  Clinical correlation and further evaluation as warranted.      Electronically signed by: Juan Tan DO  Date:    10/18/2019  Time:    16:35             Narrative:    EXAMINATION:  CT HEAD WITHOUT CONTRAST    CLINICAL HISTORY:  Focal neuro deficit, new, fixed or worsening, >6 hours;    TECHNIQUE:  Multiple sequential 5 mm axial images of the head without contrast.  Coronal and sagittal reformatted imaging from the axial acquisition.    COMPARISON:  None    FINDINGS:  Generalized cerebral volume loss with compensatory enlargement ventricles sulci and cisterns without hydrocephalus.  There is a subcentimeter ovoid focus of hypoattenuation in the right basal ganglia most compatible with remote lacunar-type infarct.  Mild patchy hypoattenuation supratentorial white matter which is nonspecific and suggestive for mild chronic ischemic change.  There is no evidence for acute intracranial hemorrhage or sulcal effacement to suggest large territory recent infarction.  The visualized paranasal sinuses and mastoid air cells are clear.  Operative change from bilateral cataract repair.                               X-Ray Chest PA And Lateral (Final result)  Result time 10/18/19 16:32:21    Final result by Kayce Castellanos MD (10/18/19 16:32:21)                 Impression:      Clear lungs.      Electronically signed by: Kayce Castellanos MD  Date:    10/18/2019  Time:    16:32             Narrative:    EXAMINATION:  XR CHEST PA AND  LATERAL    CLINICAL HISTORY:  Shortness of breath    TECHNIQUE:  PA and lateral views of the chest were performed.    COMPARISON:  Prior dated 09/20/2019    FINDINGS:  The mediastinal structures are midline.  The cardiac silhouette is not enlarged.  There is no evidence of acute pulmonary disease, pleural disease, lymph node enlargement, or cardiac decompensation.    There is fusion hardware noted at the lower cervical spine.  There is degenerative change of the shoulders.

## 2019-10-22 ENCOUNTER — OFFICE VISIT (OUTPATIENT)
Dept: NEPHROLOGY | Facility: CLINIC | Age: 74
End: 2019-10-22
Payer: MEDICARE

## 2019-10-22 VITALS
OXYGEN SATURATION: 96 % | WEIGHT: 126.75 LBS | HEART RATE: 90 BPM | BODY MASS INDEX: 23.93 KG/M2 | HEIGHT: 61 IN | DIASTOLIC BLOOD PRESSURE: 80 MMHG | SYSTOLIC BLOOD PRESSURE: 160 MMHG

## 2019-10-22 DIAGNOSIS — E11.22 CONTROLLED TYPE 2 DIABETES MELLITUS WITH STAGE 3 CHRONIC KIDNEY DISEASE, WITHOUT LONG-TERM CURRENT USE OF INSULIN: Primary | ICD-10-CM

## 2019-10-22 DIAGNOSIS — N18.30 CONTROLLED TYPE 2 DIABETES MELLITUS WITH STAGE 3 CHRONIC KIDNEY DISEASE, WITHOUT LONG-TERM CURRENT USE OF INSULIN: Primary | ICD-10-CM

## 2019-10-22 PROCEDURE — 3077F SYST BP >= 140 MM HG: CPT | Mod: HCNC,CPTII,S$GLB, | Performed by: INTERNAL MEDICINE

## 2019-10-22 PROCEDURE — 1101F PT FALLS ASSESS-DOCD LE1/YR: CPT | Mod: HCNC,CPTII,S$GLB, | Performed by: INTERNAL MEDICINE

## 2019-10-22 PROCEDURE — 3079F PR MOST RECENT DIASTOLIC BLOOD PRESSURE 80-89 MM HG: ICD-10-PCS | Mod: HCNC,CPTII,S$GLB, | Performed by: INTERNAL MEDICINE

## 2019-10-22 PROCEDURE — 99999 PR PBB SHADOW E&M-EST. PATIENT-LVL II: CPT | Mod: PBBFAC,HCNC,, | Performed by: INTERNAL MEDICINE

## 2019-10-22 PROCEDURE — 3044F HG A1C LEVEL LT 7.0%: CPT | Mod: HCNC,CPTII,S$GLB, | Performed by: INTERNAL MEDICINE

## 2019-10-22 PROCEDURE — 99999 PR PBB SHADOW E&M-EST. PATIENT-LVL II: ICD-10-PCS | Mod: PBBFAC,HCNC,, | Performed by: INTERNAL MEDICINE

## 2019-10-22 PROCEDURE — 3077F PR MOST RECENT SYSTOLIC BLOOD PRESSURE >= 140 MM HG: ICD-10-PCS | Mod: HCNC,CPTII,S$GLB, | Performed by: INTERNAL MEDICINE

## 2019-10-22 PROCEDURE — 3079F DIAST BP 80-89 MM HG: CPT | Mod: HCNC,CPTII,S$GLB, | Performed by: INTERNAL MEDICINE

## 2019-10-22 PROCEDURE — 99213 PR OFFICE/OUTPT VISIT, EST, LEVL III, 20-29 MIN: ICD-10-PCS | Mod: HCNC,S$GLB,, | Performed by: INTERNAL MEDICINE

## 2019-10-22 PROCEDURE — 3044F PR MOST RECENT HEMOGLOBIN A1C LEVEL <7.0%: ICD-10-PCS | Mod: HCNC,CPTII,S$GLB, | Performed by: INTERNAL MEDICINE

## 2019-10-22 PROCEDURE — 1101F PR PT FALLS ASSESS DOC 0-1 FALLS W/OUT INJ PAST YR: ICD-10-PCS | Mod: HCNC,CPTII,S$GLB, | Performed by: INTERNAL MEDICINE

## 2019-10-22 PROCEDURE — 99213 OFFICE O/P EST LOW 20 MIN: CPT | Mod: HCNC,S$GLB,, | Performed by: INTERNAL MEDICINE

## 2019-10-23 NOTE — PROGRESS NOTES
Subjective:       Patient ID: Regino Lawrence is a 73 y.o. Black or  female who presents for follow up of Chronic Kidney Disease    HPI    Ms. Lawrence is a 73 year old woman with medical history of diabetes, hypertension presenting for follow up of chronic kidney disease.  Patient reports blood sugars and blood pressure well-controlled at home.  She denies any NSAID use.  Patient recently seen in ED for shortness of breath, workup negative.  She otherwise denies any fever, chest pain, shortness of breath, abdominal pain, diarrhea, dysuria/hematuria.     Review of Systems   Constitutional: Negative for appetite change, fatigue and fever.   Respiratory: Negative for chest tightness and shortness of breath.    Cardiovascular: Negative for chest pain and leg swelling.   Gastrointestinal: Negative for abdominal pain, constipation, diarrhea, nausea and vomiting.   Genitourinary: Negative for difficulty urinating, dysuria, flank pain, frequency, hematuria and urgency.   Musculoskeletal: Negative for arthralgias, joint swelling and myalgias.   Skin: Negative for rash and wound.   Neurological: Negative for dizziness, weakness and light-headedness.   All other systems reviewed and are negative.      Objective:      Physical Exam   Constitutional: She appears well-developed and well-nourished.   Cardiovascular: Normal rate, regular rhythm and normal heart sounds. Exam reveals no gallop and no friction rub.   No murmur heard.  Pulmonary/Chest: Effort normal and breath sounds normal. No respiratory distress. She has no wheezes. She has no rales.   Abdominal: Soft. Bowel sounds are normal. There is no tenderness.   Musculoskeletal: She exhibits no edema.   Neurological: She is alert.   Skin: Skin is warm and dry. No rash noted. No erythema.   Psychiatric: She has a normal mood and affect.   Vitals reviewed.      Assessment:       1. Controlled type 2 diabetes mellitus with stage 3 chronic kidney disease, without  long-term current use of insulin        Plan:      Ms. Lawrence is a 73 year old woman with medical history of diabetes, hypertension presenting for follow up of chronic kidney disease.  Patient with early CKD stage III, suspect due to age-related renal nephron loss, along with possible diabetic nephropathy v. hypertensive nephrosclerosis.  Patient creatinine improved and stable after recent elevation (previously with obstructive uropathy, currently resolved, followed by Urology/UroGyn), will continue to trend.  Stressed importance of blood pressure/glycemic control to prevent any further progression of kidney disease, patient voiced understanding.    - Hypertension: blood pressure at goal at home, previously increase carvedilol to 25mg twice daily, will phone review BP diary    Return to clinic in 6-12 months with renal/heme panel, iron/TIBC/ferritin, urinalysis/culture, urine protein/creatinine ratio prior to next visit

## 2019-10-24 ENCOUNTER — OFFICE VISIT (OUTPATIENT)
Dept: CARDIOLOGY | Facility: CLINIC | Age: 74
End: 2019-10-24
Payer: MEDICARE

## 2019-10-24 VITALS
RESPIRATION RATE: 16 BRPM | WEIGHT: 127.44 LBS | HEART RATE: 86 BPM | DIASTOLIC BLOOD PRESSURE: 102 MMHG | SYSTOLIC BLOOD PRESSURE: 168 MMHG | OXYGEN SATURATION: 94 % | BODY MASS INDEX: 24.08 KG/M2

## 2019-10-24 DIAGNOSIS — R07.89 CHEST PAIN, ATYPICAL: ICD-10-CM

## 2019-10-24 DIAGNOSIS — E11.69 COMBINED HYPERLIPIDEMIA ASSOCIATED WITH TYPE 2 DIABETES MELLITUS: Chronic | ICD-10-CM

## 2019-10-24 DIAGNOSIS — I10 ESSENTIAL HYPERTENSION: Chronic | ICD-10-CM

## 2019-10-24 DIAGNOSIS — E78.2 COMBINED HYPERLIPIDEMIA ASSOCIATED WITH TYPE 2 DIABETES MELLITUS: Chronic | ICD-10-CM

## 2019-10-24 DIAGNOSIS — R06.09 DOE (DYSPNEA ON EXERTION): ICD-10-CM

## 2019-10-24 DIAGNOSIS — E11.3292 CONTROLLED TYPE 2 DIABETES MELLITUS WITH LEFT EYE AFFECTED BY MILD NONPROLIFERATIVE RETINOPATHY WITHOUT MACULAR EDEMA, WITHOUT LONG-TERM CURRENT USE OF INSULIN: Primary | Chronic | ICD-10-CM

## 2019-10-24 PROCEDURE — 1101F PR PT FALLS ASSESS DOC 0-1 FALLS W/OUT INJ PAST YR: ICD-10-PCS | Mod: HCNC,CPTII,S$GLB, | Performed by: INTERNAL MEDICINE

## 2019-10-24 PROCEDURE — 99214 PR OFFICE/OUTPT VISIT, EST, LEVL IV, 30-39 MIN: ICD-10-PCS | Mod: HCNC,S$GLB,, | Performed by: INTERNAL MEDICINE

## 2019-10-24 PROCEDURE — 3080F PR MOST RECENT DIASTOLIC BLOOD PRESSURE >= 90 MM HG: ICD-10-PCS | Mod: HCNC,CPTII,S$GLB, | Performed by: INTERNAL MEDICINE

## 2019-10-24 PROCEDURE — 99999 PR PBB SHADOW E&M-EST. PATIENT-LVL III: CPT | Mod: PBBFAC,HCNC,, | Performed by: INTERNAL MEDICINE

## 2019-10-24 PROCEDURE — 3044F PR MOST RECENT HEMOGLOBIN A1C LEVEL <7.0%: ICD-10-PCS | Mod: HCNC,CPTII,S$GLB, | Performed by: INTERNAL MEDICINE

## 2019-10-24 PROCEDURE — 3077F PR MOST RECENT SYSTOLIC BLOOD PRESSURE >= 140 MM HG: ICD-10-PCS | Mod: HCNC,CPTII,S$GLB, | Performed by: INTERNAL MEDICINE

## 2019-10-24 PROCEDURE — 99214 OFFICE O/P EST MOD 30 MIN: CPT | Mod: HCNC,S$GLB,, | Performed by: INTERNAL MEDICINE

## 2019-10-24 PROCEDURE — 1101F PT FALLS ASSESS-DOCD LE1/YR: CPT | Mod: HCNC,CPTII,S$GLB, | Performed by: INTERNAL MEDICINE

## 2019-10-24 PROCEDURE — 3077F SYST BP >= 140 MM HG: CPT | Mod: HCNC,CPTII,S$GLB, | Performed by: INTERNAL MEDICINE

## 2019-10-24 PROCEDURE — 3080F DIAST BP >= 90 MM HG: CPT | Mod: HCNC,CPTII,S$GLB, | Performed by: INTERNAL MEDICINE

## 2019-10-24 PROCEDURE — 3044F HG A1C LEVEL LT 7.0%: CPT | Mod: HCNC,CPTII,S$GLB, | Performed by: INTERNAL MEDICINE

## 2019-10-24 PROCEDURE — 99999 PR PBB SHADOW E&M-EST. PATIENT-LVL III: ICD-10-PCS | Mod: PBBFAC,HCNC,, | Performed by: INTERNAL MEDICINE

## 2019-10-24 NOTE — PROGRESS NOTES
Subjective:    Patient ID:  Regino Lawrence is a 73 y.o. female who presents for follow-up of Hospital Follow Up and Shortness of Breath      HPI     HTN, DM, HLD,  sarcoidosis     Previously saw Dr Holden  HPI: She is schedule for gynecologic surgery on Monday. As part of her preoperative evaluation, an ECG was done which she was told was abnormal. I do not have the ECG to review. She has no cardiac history. Regino Lawrence denies any chest pain, shortness of breath, PND, orthopnea, palpitations, or syncope. Her ECG today is normal. All ECG's reviewed in EP[facility] were normal. She walks with a cane due to arthritis.     1. Controlled type 2 diabetes mellitus without complication, without long-term current use of insulin    2. Preop cardiovascular exam : She has 0 clinical risk factors according to the RCRI placing her at low risk for a perioperative cardiac complication. She has no symptoms suggestive of cardiopulmonary disease, and her ECG is normal. No further cardiac testing is required prior to her surgery. She should continue on her clonidine during the perioperative period to avoid rebound hypertension.   3. Essential hypertension       Echo 4/11/19  · Normal left ventricular systolic function. The estimated ejection fraction is 55%  · Concentric left ventricular hypertrophy.  · Grade I (mild) left ventricular diastolic dysfunction consistent with impaired relaxation.      Stress test 4/11/19  · Normal myocardial perfusion scan  · There were no arrhythmias during stress.  · There was no ST segment deviation noted during stress.  · The patient reported dizziness, SOB (non-anginal) and light headedness during the stress test.  · The perfusion scan is free of evidence from myocardial ischemia or injury.  · LVEF post-stress is 62%  · The EKG portion of this study is negative for myocardial ischemia.        4/3/19 Reports worsening FAUSTIN for several months - sometimes associated with chest tightness  EKG sinus tachycardia  104 otherwise ok  Denies prior CAD  Walks with a walker     5/1/19 Still with episodes of chest tightness - usually lasts all day  Not reproducible with palpation   CP atypical - likely musculoskeletal - will observe  OV 3 months     9/19/19 Having issues with hip pain  CP improved  Reports SOB with cough productive of clear mucus  EKG NSR PRWP - similar to previous EKG   Will give doxycycline for URI  Otherwise cardiac stable  OV 6 months    Admitted 10/18/19  Regino Lawrence 73 y.o. female with sarcoidosis, HTN, HLD, DM2, hypothyroid presents to the hospital with a chief complaint of left sided numbness. She reports last night she awoke with left sided numbness initially on the left side of her face but then extended to left arm and leg. The left arm and leg have since improved, but she still reports left sided facial numbness. She denies any weakness on her left side, she denies headache, vision changes, slurred speech, difficulty swallowing, chest pain, dysuria, dizziness.  Her left side is slightly weaker at baseline due to arthritis, and she is unsure if there is new weakness there today. She has had an MRI in the past, that she reports required medication to sedate due to claustrophobia.     She also reports SOB and dyspnea on exertion for the last 2 months. She has been seen by her PCP for this and was instructed to present to ED for CTA. The CTA was negative for PE. She has also seen a cardiologist. Her echo/stress test in 4/2019 were with EF of 55% and no ischemia. She denies any leg swelling associated with this. The SOB has been progressive for the last 2 months. It now makes it difficult to ambulate. She believes her SOB may be related to sarcoidosis. Her sarcoidosis previously manifested as arthralgias and myalgias.      In the ED, EKG of sinus rhythm, CT head without acuet hemorrhage or large infarct, chest x-ray with clear lungs, troponin and BNP negative, UA without leukocytes or nitrites.       Regino Lawrence is a  73 y.o. female very pleasant, who was placed in observation for left sided numbness. In the ED, EKG of sinus rhythm, CT head without acuet hemorrhage or large infarct, chest x-ray with clear lungs, troponin and BNP negative, UA without leukocytes or nitrites. MRI negative for acute stroke. She was noted to have mild dehydration with decreased GFR=58 which normalized with IV fluids. The patient has known multi-level spondylosis affecting cervical and lumbar spine as well as mostly controlled sarcoid which she reports her PCP controls with oral steroids. She is on prednisone 5 mg daily and states that when she senses a flare she usual contacts her PCP who will generally order higher dose of oral steroids until the flare decreases. She reports that her PCP was out of town this time and she was instructed to present to the ED. Inflammatory markers obtained which sowed elevated sed rate 22 and crp 8.4. She is already taking methotrexate daily along with daily dose of prednisone 5 mg. She does not have regular contact with pulmonologist or rheumatology per her statement.      In early morning the patient reports that her numbness has completely resolved. She was administered IV steroid with intensions to increase her dose at discharge with standard taper down over time to her usual prednisone 5 mg daily. There is no leukocytosis or fever noted which is promising. She is comfortable appearing and does not appear in any acute distress. There is no adenopathy appreciated on exam and her 02 sat on room air remains a consistent 99% with clear lungs. Sentences are congruent without interruption. Seen and evaluated by neurology no new recommendations.     I suspect that the patient may be out of her pain medication (Vicodin) as her last RX shown on the [employer] website was noted to be 9/25/19 - ordered 60 pills monthly, however the patient reports to me that she is taking them three times daily. She  already sees pain management and has appointment scheduled for next week 10/23/19. This use may be necessary for the patients' increased pain, however, this increased narcotic usage is risky given her age and co morbidities. Suspect the extra doses of narcotics daily may be contributory to her report of increased fatigue and endurance, although not altogether the only contribution/explanation for her symptoms. The chronicity of her sarcoid and DJD is also significant to consider. Instructed on the dangers of not taking narcotics as RX'ed. Home on steroid taper (medrol dose pack) then resume her usual prednisone 5 mg daily. She has appropriate and adequate outpatient follow ups scheduled; On chart review patient has appointments with pain management 10/23/19, Cardiology 10/24/19, Chemo 10/25/19, ophthalmology 10/28/19, Hem/Onc 11/5/19, and pulmonology 11/21/19. Discharge to home in stable condition.     EKG 10/18/19 NSR - normal  Continues to report FAUSTIN   Denies CP  Scheduled to see pulmonary next week      Review of Systems   Constitution: Negative for decreased appetite.   HENT: Negative for ear discharge.    Eyes: Negative for blurred vision.   Respiratory: Negative for hemoptysis.    Endocrine: Negative for polyphagia.   Hematologic/Lymphatic: Negative for adenopathy.   Skin: Negative for color change.   Musculoskeletal: Negative for joint swelling.   Genitourinary: Negative for bladder incontinence.   Neurological: Negative for brief paralysis.   Psychiatric/Behavioral: Negative for hallucinations.   Allergic/Immunologic: Negative for hives.        Objective:    Physical Exam   Constitutional: She is oriented to person, place, and time. She appears well-developed and well-nourished.   HENT:   Head: Normocephalic and atraumatic.   Eyes: Pupils are equal, round, and reactive to light. Conjunctivae are normal.   Neck: Normal range of motion. Neck supple.   Cardiovascular: Normal rate, normal heart sounds and intact  distal pulses.   Pulmonary/Chest: Effort normal and breath sounds normal.   Abdominal: Soft. Bowel sounds are normal.   Musculoskeletal: Normal range of motion.   Neurological: She is alert and oriented to person, place, and time.   Skin: Skin is warm and dry.         Assessment:       1. 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 Combined 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    3. Essential hypertension    4. FAUSTIN (dyspnea on exertion)    5. Chest pain, atypical         Plan:       Doubt FAUSTIN is cardiac  Agree with pulmonary evalution  Back to the ER if symptoms worsen

## 2019-10-28 ENCOUNTER — PROCEDURE VISIT (OUTPATIENT)
Dept: OPHTHALMOLOGY | Facility: CLINIC | Age: 74
End: 2019-10-28
Attending: OPHTHALMOLOGY
Payer: MEDICARE

## 2019-10-28 VITALS — HEART RATE: 89 BPM | DIASTOLIC BLOOD PRESSURE: 84 MMHG | SYSTOLIC BLOOD PRESSURE: 150 MMHG

## 2019-10-28 DIAGNOSIS — H34.8120 HEMISPHERIC RETINAL VEIN OCCLUSION WITH MACULAR EDEMA OF LEFT EYE: ICD-10-CM

## 2019-10-28 PROCEDURE — 99499 UNLISTED E&M SERVICE: CPT | Mod: HCNC,S$GLB,, | Performed by: OPHTHALMOLOGY

## 2019-10-28 PROCEDURE — 92134 POSTERIOR SEGMENT OCT RETINA (OCULAR COHERENCE TOMOGRAPHY)-BOTH EYES: ICD-10-PCS | Mod: HCNC,S$GLB,, | Performed by: OPHTHALMOLOGY

## 2019-10-28 PROCEDURE — 67028 INJECTION EYE DRUG: CPT | Mod: HCNC,LT,S$GLB, | Performed by: OPHTHALMOLOGY

## 2019-10-28 PROCEDURE — 92134 CPTRZ OPH DX IMG PST SGM RTA: CPT | Mod: HCNC,S$GLB,, | Performed by: OPHTHALMOLOGY

## 2019-10-28 PROCEDURE — 67028 PR INJECT INTRAVITREAL PHARMCOLOGIC: ICD-10-PCS | Mod: HCNC,LT,S$GLB, | Performed by: OPHTHALMOLOGY

## 2019-10-28 PROCEDURE — 99499 NO LOS: ICD-10-PCS | Mod: HCNC,S$GLB,, | Performed by: OPHTHALMOLOGY

## 2019-10-28 RX ADMIN — Medication 1.25 MG: at 11:10

## 2019-10-28 NOTE — PROGRESS NOTES
Subjective:       Patient ID: Regino Lawrence is a 73 y.o. female      Chief Complaint   Patient presents with    Diabetic Eye Exam     History of Present Illness  HPI     DLS 09/09/19 here today for fu/injection  CRVO - OD/ NPDR    Pt states that the vision stable.  No pain.  No f/f    Eye Med(s) - Artificial Tears - OU PRN    Last edited by Hemanth Baker MD on 10/28/2019 12:38 PM. (History)        Imaging:    See report    Assessment/Plan:     1. 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     Patient identified.  Timeout performed.    Risks, benefits, and alternatives to treatment were discussed in detail with the patient, including bleeding/infection (endophthalmitis)/etc.  The patient voiced understanding and wished to proceed with the procedure.  See separate consent form.    Injection Procedure Note:  Diagnosis: RVO with ME Left Eye    Topical Proparacaine drop placed then topical 5% Betadine  Sterile gloves used, and sterile lid speculum placed.  5% Betadine placed at injection site again prior to injection.  Avastin 1.25mg in 0.05cc Injected inferotemporally 3.5-4mm posterior to the limbus.  Complications: None  Va at least CF at 5 feet post injection.  Retina, ONH, IOP normal after injection.    Followup as above.  Patient should return immediately PRN.  Retinal Detachment and Endophthalmitis precautions given.

## 2019-10-28 NOTE — PATIENT INSTRUCTIONS
POST INTRAVITREAL INJECTION PATIENT INSTRUCTIONS   Below are some guidelines for what to expect after your treatment and additional care instructions.   * you may experience mild discomfort in your eye after the treatment. Your eye usually feels better within 24 to 48 hours after the procedure.   * you have been given drops that numb the surface of your eye.   DO NOT rub or touch your eye, DO NOT wear contacts until numbness goes away.   * you may experience small spots that appear in your field of vision, these are usually caused by an air bubble or from the medication. It taked a few hours or days for these to go away.   * use of sunglasses will help reduce light sensitivity and glare.   * DO NOT swim   for at least 3 hours after the injection   * If you have a gritty, burning, irritating or stinging feeling in the injected eye. This may be a result of the antiseptic used. Use artifical tears or eye lubricant ( from over- the- counter from your local pharmacy ). If you have some at home already please check the expiration date, so not to use anything contaminated in your eye. A cool pack over your eye brow above the injected eye may also relieve discomfort.   Call us right away or go to the Emergency Department if you have a dramatic decrease in vision or extreme pain in the eye.   [facility] [phone]

## 2019-10-29 DIAGNOSIS — I10 ESSENTIAL HYPERTENSION: Chronic | ICD-10-CM

## 2019-10-29 RX ORDER — CLONIDINE HYDROCHLORIDE 0.3 MG/1
0.3 TABLET ORAL 3 TIMES DAILY
Qty: 270 TABLET | Refills: 1 | Status: SHIPPED | OUTPATIENT
Start: 2019-10-29 | End: 2020-06-08 | Stop reason: SDUPTHER

## 2019-11-01 ENCOUNTER — LAB VISIT (OUTPATIENT)
Dept: LAB | Facility: HOSPITAL | Age: 74
End: 2019-11-01
Attending: INTERNAL MEDICINE
Payer: MEDICARE

## 2019-11-01 DIAGNOSIS — N18.9 ANEMIA IN CHRONIC KIDNEY DISEASE, UNSPECIFIED CKD STAGE: ICD-10-CM

## 2019-11-01 DIAGNOSIS — D63.1 ANEMIA IN CHRONIC KIDNEY DISEASE, UNSPECIFIED CKD STAGE: ICD-10-CM

## 2019-11-01 LAB
BASOPHILS # BLD AUTO: 0.04 K/UL (ref 0–0.2)
BASOPHILS NFR BLD: 0.7 % (ref 0–1.9)
DIFFERENTIAL METHOD: ABNORMAL
EOSINOPHIL # BLD AUTO: 0.1 K/UL (ref 0–0.5)
EOSINOPHIL NFR BLD: 1.4 % (ref 0–8)
ERYTHROCYTE [DISTWIDTH] IN BLOOD BY AUTOMATED COUNT: 13.5 % (ref 11.5–14.5)
FERRITIN SERPL-MCNC: 311 NG/ML (ref 20–300)
HCT VFR BLD AUTO: 33.2 % (ref 37–48.5)
HGB BLD-MCNC: 10.1 G/DL (ref 12–16)
IMM GRANULOCYTES # BLD AUTO: 0.04 K/UL (ref 0–0.04)
IMM GRANULOCYTES NFR BLD AUTO: 0.7 % (ref 0–0.5)
IRON SERPL-MCNC: 68 UG/DL (ref 30–160)
LYMPHOCYTES # BLD AUTO: 1.2 K/UL (ref 1–4.8)
LYMPHOCYTES NFR BLD: 21.2 % (ref 18–48)
MCH RBC QN AUTO: 34.8 PG (ref 27–31)
MCHC RBC AUTO-ENTMCNC: 30.4 G/DL (ref 32–36)
MCV RBC AUTO: 115 FL (ref 82–98)
MONOCYTES # BLD AUTO: 0.4 K/UL (ref 0.3–1)
MONOCYTES NFR BLD: 7.2 % (ref 4–15)
NEUTROPHILS # BLD AUTO: 3.9 K/UL (ref 1.8–7.7)
NEUTROPHILS NFR BLD: 68.8 % (ref 38–73)
NRBC BLD-RTO: 0 /100 WBC
PLATELET # BLD AUTO: 278 K/UL (ref 150–350)
PMV BLD AUTO: 9.8 FL (ref 9.2–12.9)
RBC # BLD AUTO: 2.9 M/UL (ref 4–5.4)
SATURATED IRON: 19 % (ref 20–50)
TOTAL IRON BINDING CAPACITY: 354 UG/DL (ref 250–450)
TRANSFERRIN SERPL-MCNC: 239 MG/DL (ref 200–375)
WBC # BLD AUTO: 5.71 K/UL (ref 3.9–12.7)

## 2019-11-01 PROCEDURE — 83540 ASSAY OF IRON: CPT | Mod: HCNC

## 2019-11-01 PROCEDURE — 82728 ASSAY OF FERRITIN: CPT | Mod: HCNC

## 2019-11-01 PROCEDURE — 85025 COMPLETE CBC W/AUTO DIFF WBC: CPT | Mod: HCNC

## 2019-11-01 PROCEDURE — 36415 COLL VENOUS BLD VENIPUNCTURE: CPT | Mod: HCNC,PO

## 2019-11-04 ENCOUNTER — PATIENT MESSAGE (OUTPATIENT)
Dept: FAMILY MEDICINE | Facility: CLINIC | Age: 74
End: 2019-11-04

## 2019-11-04 DIAGNOSIS — E11.8 CONTROLLED TYPE 2 DIABETES MELLITUS WITH COMPLICATION, WITHOUT LONG-TERM CURRENT USE OF INSULIN: Primary | ICD-10-CM

## 2019-11-04 DIAGNOSIS — M51.36 DEGENERATIVE DISC DISEASE, LUMBAR: ICD-10-CM

## 2019-11-04 RX ORDER — HYDROCODONE BITARTRATE AND ACETAMINOPHEN 5; 325 MG/1; MG/1
1 TABLET ORAL EVERY 6 HOURS PRN
Qty: 90 TABLET | Refills: 0 | Status: SHIPPED | OUTPATIENT
Start: 2019-11-04 | End: 2019-12-09 | Stop reason: SDUPTHER

## 2019-11-05 ENCOUNTER — PATIENT MESSAGE (OUTPATIENT)
Dept: FAMILY MEDICINE | Facility: CLINIC | Age: 74
End: 2019-11-05

## 2019-11-05 ENCOUNTER — OFFICE VISIT (OUTPATIENT)
Dept: HEMATOLOGY/ONCOLOGY | Facility: CLINIC | Age: 74
End: 2019-11-05
Payer: MEDICARE

## 2019-11-05 VITALS
HEART RATE: 85 BPM | BODY MASS INDEX: 24.35 KG/M2 | HEIGHT: 61 IN | TEMPERATURE: 98 F | OXYGEN SATURATION: 97 % | DIASTOLIC BLOOD PRESSURE: 62 MMHG | WEIGHT: 129 LBS | SYSTOLIC BLOOD PRESSURE: 138 MMHG

## 2019-11-05 DIAGNOSIS — N18.9 CHRONIC KIDNEY DISEASE, UNSPECIFIED CKD STAGE: Primary | ICD-10-CM

## 2019-11-05 DIAGNOSIS — D50.9 IRON DEFICIENCY ANEMIA, UNSPECIFIED IRON DEFICIENCY ANEMIA TYPE: ICD-10-CM

## 2019-11-05 DIAGNOSIS — D63.1 ANEMIA IN CHRONIC KIDNEY DISEASE, UNSPECIFIED CKD STAGE: ICD-10-CM

## 2019-11-05 DIAGNOSIS — N18.9 ANEMIA IN CHRONIC KIDNEY DISEASE, UNSPECIFIED CKD STAGE: ICD-10-CM

## 2019-11-05 PROCEDURE — 3075F PR MOST RECENT SYSTOLIC BLOOD PRESS GE 130-139MM HG: ICD-10-PCS | Mod: HCNC,CPTII,S$GLB, | Performed by: INTERNAL MEDICINE

## 2019-11-05 PROCEDURE — 3078F DIAST BP <80 MM HG: CPT | Mod: HCNC,CPTII,S$GLB, | Performed by: INTERNAL MEDICINE

## 2019-11-05 PROCEDURE — 99214 OFFICE O/P EST MOD 30 MIN: CPT | Mod: HCNC,S$GLB,, | Performed by: INTERNAL MEDICINE

## 2019-11-05 PROCEDURE — 99214 PR OFFICE/OUTPT VISIT, EST, LEVL IV, 30-39 MIN: ICD-10-PCS | Mod: HCNC,S$GLB,, | Performed by: INTERNAL MEDICINE

## 2019-11-05 PROCEDURE — 1101F PT FALLS ASSESS-DOCD LE1/YR: CPT | Mod: HCNC,CPTII,S$GLB, | Performed by: INTERNAL MEDICINE

## 2019-11-05 PROCEDURE — 99999 PR PBB SHADOW E&M-EST. PATIENT-LVL III: CPT | Mod: PBBFAC,HCNC,, | Performed by: INTERNAL MEDICINE

## 2019-11-05 PROCEDURE — 3078F PR MOST RECENT DIASTOLIC BLOOD PRESSURE < 80 MM HG: ICD-10-PCS | Mod: HCNC,CPTII,S$GLB, | Performed by: INTERNAL MEDICINE

## 2019-11-05 PROCEDURE — 1101F PR PT FALLS ASSESS DOC 0-1 FALLS W/OUT INJ PAST YR: ICD-10-PCS | Mod: HCNC,CPTII,S$GLB, | Performed by: INTERNAL MEDICINE

## 2019-11-05 PROCEDURE — 99999 PR PBB SHADOW E&M-EST. PATIENT-LVL III: ICD-10-PCS | Mod: PBBFAC,HCNC,, | Performed by: INTERNAL MEDICINE

## 2019-11-05 PROCEDURE — 3075F SYST BP GE 130 - 139MM HG: CPT | Mod: HCNC,CPTII,S$GLB, | Performed by: INTERNAL MEDICINE

## 2019-11-05 NOTE — Clinical Note
Cont procrit q 2wks ( next Procrit inj on  11/8/2019 ) pt does not need cbc this weekSched CBC  q2 wks x 8 Beginning 2weeks from now ( prior to procrit) Fe studies prior to f/u 2mos at [facility]

## 2019-11-05 NOTE — PROGRESS NOTES
Subjective:       Patient ID: Regino Lawrence is a 74 y.o. female.    Chief Complaint: Follow-up  Diagnosis: Anemia in CKD  Patient is a Shinto  .            The patient is seen today for chronic anemia in CKD. The patient reports that she has been diagnosed with JOLLY in the past.  She has been on oral iron supplementation therapy, but could not tolerate or did not respond, she is uncertain.  She is followed by GI and has undergone a colonoscopy earlier this year and was diagnosed with hemorrhoids for which she underwent banding procedure.  No melena, hematochezia,change in bowel habits.  She has also been diagnosed with B12 deficiency in the past, but reports she did not respond to B12 injections. No history of blood transfusions.  She is a Shinto.  She reports that she remembers getting injections in the 1970s when her blood count was low.        She is followed by Rheumatology for history of sarcoidosis with associated myopathy and arthropathy.   .She has been treated in the  past with methotrexate and Plaquenil, both of which were ineffective  Cellcept and imuran caused some unknown side effect.   Colchicine  held due to low WBC       Chronic diffuse arthralgias-stable     She was undergoing Procrit therapy q 2wks  Procrit therapy recently on Hold  ( lab parameters)       She was recently hospitalized 10/18/2019  She presented with SOB and dyspnea on exertion for the last 2 months.The CTA was negative for PE. She has also seen a cardiologist. Her echo/stress test in 4/2019 were with EF of 55% and no ischemia. She denies any leg swelling associated with this. She was treated for a possible  Sarcoidosis flare-up    She is feeling much better now.      She also has  undergone intermittent IV iron therapy  Hb 10.1 g/dl   No SOB/CP  Appetite and weight stable     She is followed by pain mgmt for chronic hip pain   She was diagnosed with bursitis of hip and recently underwent steroid inj  history  of cervical spinal stenosis s/p posterior C3-C7 laminectomy and fusion on 11/16/15 by Dr. Conner.   She was referred to Dr. Richards for a left L5 transforaminal injection which was completed on 10/8/18.      She also reports SOB and dyspnea on exertion for the last 2 months. She has been seen by her PCP for this and was instructed to present to ED for CTA. The CTA was negative for PE. She has also seen a cardiologist. Her echo/stress test in 2019 were with EF of 55% and no ischemia. She denies any leg swelling associated with this. The SOB has been progressive for the last 2 months. It now makes it difficult to ambulate. She believes her SOB may be related to sarcoidosis  Instructed on the dangers of not taking narcotics as RX'ed. Home on steroid taper (medrol dose pack) then resume her usual prednisone 5 mg daily      Pt reports she is doing much better since hosp discharge   She reports her breathing has also improved      CBC 2019  reveals wbc 5710/mm3  Hb 10.1  g/dl Hct 33.2% Plt ct 278k      Patient was in the ED 2018 and was seen on   at [facility] for back issues  She is followed by Neurosurgery for cervical spinal stenosis s/p posterior C3-C7 laminectomy and fusion      PAST MEDICAL HISTORY:  Acid reflux, alopecia, anemia, anxiety disorder, chronic  kidney disease, depression, diabetes mellitus type 2, hyperlipidemia,  hypertension, hypothyroidism, osteoporosis, sarcoidosis.    PAST SURGICAL HISTORY:  Cholecystectomy, , tubal ligation, carpal tunnel release, cataracts.    FAMILY HISTORY: Unremarkable for cancer. Significant for HTN.       Review of Systems   Constitutional: Negative for activity change, appetite change and fatigue.   HENT: Negative for hearing loss and nosebleeds.    Eyes: Negative for visual disturbance.   Respiratory: Negative for cough and shortness of breath.    Cardiovascular: Negative for chest pain and leg swelling.   Gastrointestinal: Negative for abdominal pain,  "constipation, diarrhea and nausea.   Genitourinary: Negative for flank pain and urgency.   Musculoskeletal: Positive for arthralgias and back pain. Negative for gait problem and joint swelling.   Skin: Negative for rash.        No petechiae, ecchymoses   Neurological: Negative for light-headedness and headaches.   Hematological: Negative for adenopathy. Does not bruise/bleed easily.       Objective:       .  Vitals:    11/05/19 1016   BP: 138/62   BP Location: Right arm   Patient Position: Sitting   BP Method: Medium (Manual)   Pulse: 85   Temp: 98.2 °F (36.8 °C)   TempSrc: Oral   SpO2: 97%   Weight: 58.5 kg (128 lb 15.5 oz)   Height: 5' 1" (1.549 m)       Physical Exam   Constitutional: She is oriented to person, place, and time. She appears well-developed and well-nourished.   HENT:   Head: Normocephalic.   Mouth/Throat: Oropharynx is clear and moist. No oropharyngeal exudate.   Eyes: Pupils are equal, round, and reactive to light. Conjunctivae and lids are normal. No scleral icterus.   Neck: Normal range of motion. Neck supple. No thyromegaly present.   Cardiovascular: Normal rate, regular rhythm and normal heart sounds.   No murmur heard.  Pulmonary/Chest: Breath sounds normal. She has no wheezes. She has no rales.   Abdominal: Soft. Bowel sounds are normal. She exhibits no distension and no mass. There is no hepatosplenomegaly. There is no tenderness. There is no rebound and no guarding.   Musculoskeletal: Normal range of motion. She exhibits no edema or tenderness.   Neurological: She is alert and oriented to person, place, and time. No cranial nerve deficit. Coordination normal.   Skin: Skin is warm and dry. No ecchymosis, no petechiae and no rash noted. No erythema.   Psychiatric: She has a normal mood and affect.             Lab Results   Component Value Date    WBC 5.71 11/01/2019    HGB 10.1 (L) 11/01/2019    HCT 33.2 (L) 11/01/2019     (H) 11/01/2019     11/01/2019     Lab Results "   Component Value Date    IRON 68 11/01/2019    TIBC 354 11/01/2019    FERRITIN 311 (H) 11/01/2019     SPEP-nl          CT renal 3/6/2017   IMPRESSION:  1.  No renal, ureteral or bladder calculi.  No hydronephrosis or ureterectasis.  2.  Poorly distended bladder.  Mild bladder wall prominence.  Mild cystitis cannot be   excluded.  3.  Moderate constipation.  Normal appendix      Lab Results   Component Value Date    IRON 68 11/01/2019    TIBC 354 11/01/2019    FERRITIN 311 (H) 11/01/2019     Lab Results   Component Value Date    WBC 5.71 11/01/2019    HGB 10.1 (L) 11/01/2019    HCT 33.2 (L) 11/01/2019     (H) 11/01/2019     11/01/2019         Assessment:       1. Chronic kidney disease, unspecified CKD stage    2. Anemia in chronic kidney disease, unspecified CKD stage    3. Iron deficiency anemia, unspecified iron deficiency anemia type        Plan:   1-3   Pt clinically stable  Pt is a Jainism and declines/not interested in blood and blood products due to Taoist beliefs  CBC 11/1/2019  reveals wbc 5710/mm3  Hb 10.1  g/dl Hct 33.2% Plt ct 278k  Ferritin 311  Fe sats 22  Cont t procrit  20,000u q 2wk ( per lab parameters)  Pt followed by Nephrology . It has been determined early CKD stage III, suspect due to age-related renal nephron loss, along with possible diabetic nephropathy v. hypertensive nephrosclerosis    Procrit has been held ( needs to be re auth)   CBC q2wks STANDING    Follow-up with PCP for med mgmt      Advance Care Planning     Power of   I initiated the process of advance care planning today and explained the importance of this process to the patient.  I introduced the concept of advance directives to the patient, as well. Then the patient received detailed information about the importance of designating a Health Care Power of  (HCPOA). She was also instructed to communicate with this person about their wishes for future healthcare, should she become sick  and lose decision-making capacity. The patient has  previously appointed a HCPOA. Pt completed in 2015             F/u 2 mos with Fe studies    CC: Micaela Mendoza M.D.

## 2019-11-06 ENCOUNTER — TELEPHONE (OUTPATIENT)
Dept: FAMILY MEDICINE | Facility: CLINIC | Age: 74
End: 2019-11-06

## 2019-11-06 NOTE — TELEPHONE ENCOUNTER
Notified KYLIE that the patient DX Code for the Norco is M51.36. KYLIE states that the patient verification needed to be done since since it has been 6 months.    Thanks,  Ten

## 2019-11-06 NOTE — TELEPHONE ENCOUNTER
----- Message from Kristi Silver sent at 11/6/2019  8:43 AM CST -----  Contact: Self   Type:  Pharmacy Calling to Clarify an RX    Name of Caller:  Jorden    Pharmacy Name: [id number] - [place] - [address] [facility] [address] [phone] (Phone)  [phone] (Fax)    Prescription Name: HYDROcodone-acetaminophen (NORCO) 5-325 mg per tablet    What do they need to clarify?  Pharmacy need to confirm the patient's diagnosis for her pain medication

## 2019-11-07 ENCOUNTER — OFFICE VISIT (OUTPATIENT)
Dept: FAMILY MEDICINE | Facility: CLINIC | Age: 74
End: 2019-11-07
Payer: MEDICARE

## 2019-11-07 VITALS
DIASTOLIC BLOOD PRESSURE: 64 MMHG | WEIGHT: 127.63 LBS | TEMPERATURE: 98 F | BODY MASS INDEX: 24.1 KG/M2 | OXYGEN SATURATION: 96 % | SYSTOLIC BLOOD PRESSURE: 130 MMHG | HEART RATE: 92 BPM | HEIGHT: 61 IN

## 2019-11-07 DIAGNOSIS — R06.09 DOE (DYSPNEA ON EXERTION): ICD-10-CM

## 2019-11-07 DIAGNOSIS — M54.42 CHRONIC BILATERAL LOW BACK PAIN WITH LEFT-SIDED SCIATICA: ICD-10-CM

## 2019-11-07 DIAGNOSIS — E11.8 CONTROLLED TYPE 2 DIABETES MELLITUS WITH COMPLICATION, WITHOUT LONG-TERM CURRENT USE OF INSULIN: Chronic | ICD-10-CM

## 2019-11-07 DIAGNOSIS — D86.9 SARCOIDOSIS: Chronic | ICD-10-CM

## 2019-11-07 DIAGNOSIS — Z79.52 CURRENT USE OF STEROID MEDICATION: ICD-10-CM

## 2019-11-07 DIAGNOSIS — G89.29 CHRONIC BILATERAL LOW BACK PAIN WITH LEFT-SIDED SCIATICA: ICD-10-CM

## 2019-11-07 DIAGNOSIS — Z09 HOSPITAL DISCHARGE FOLLOW-UP: Primary | ICD-10-CM

## 2019-11-07 PROCEDURE — 3078F DIAST BP <80 MM HG: CPT | Mod: HCNC,CPTII,S$GLB, | Performed by: FAMILY MEDICINE

## 2019-11-07 PROCEDURE — 99215 OFFICE O/P EST HI 40 MIN: CPT | Mod: HCNC,S$GLB,, | Performed by: FAMILY MEDICINE

## 2019-11-07 PROCEDURE — 1101F PR PT FALLS ASSESS DOC 0-1 FALLS W/OUT INJ PAST YR: ICD-10-PCS | Mod: HCNC,CPTII,S$GLB, | Performed by: FAMILY MEDICINE

## 2019-11-07 PROCEDURE — 3044F HG A1C LEVEL LT 7.0%: CPT | Mod: HCNC,CPTII,S$GLB, | Performed by: FAMILY MEDICINE

## 2019-11-07 PROCEDURE — 99215 PR OFFICE/OUTPT VISIT, EST, LEVL V, 40-54 MIN: ICD-10-PCS | Mod: HCNC,S$GLB,, | Performed by: FAMILY MEDICINE

## 2019-11-07 PROCEDURE — 1101F PT FALLS ASSESS-DOCD LE1/YR: CPT | Mod: HCNC,CPTII,S$GLB, | Performed by: FAMILY MEDICINE

## 2019-11-07 PROCEDURE — 3075F SYST BP GE 130 - 139MM HG: CPT | Mod: HCNC,CPTII,S$GLB, | Performed by: FAMILY MEDICINE

## 2019-11-07 PROCEDURE — 99999 PR PBB SHADOW E&M-EST. PATIENT-LVL IV: ICD-10-PCS | Mod: PBBFAC,HCNC,, | Performed by: FAMILY MEDICINE

## 2019-11-07 PROCEDURE — 3075F PR MOST RECENT SYSTOLIC BLOOD PRESS GE 130-139MM HG: ICD-10-PCS | Mod: HCNC,CPTII,S$GLB, | Performed by: FAMILY MEDICINE

## 2019-11-07 PROCEDURE — 3078F PR MOST RECENT DIASTOLIC BLOOD PRESSURE < 80 MM HG: ICD-10-PCS | Mod: HCNC,CPTII,S$GLB, | Performed by: FAMILY MEDICINE

## 2019-11-07 PROCEDURE — 3044F PR MOST RECENT HEMOGLOBIN A1C LEVEL <7.0%: ICD-10-PCS | Mod: HCNC,CPTII,S$GLB, | Performed by: FAMILY MEDICINE

## 2019-11-07 PROCEDURE — 99999 PR PBB SHADOW E&M-EST. PATIENT-LVL IV: CPT | Mod: PBBFAC,HCNC,, | Performed by: FAMILY MEDICINE

## 2019-11-07 NOTE — PROGRESS NOTES
Chief Complaint   Patient presents with    Hospital Follow Up       HPI  Regino Lawrence is a 74 y.o. female with multiple medical diagnoses as listed in the medical history and problem list that presents for follow-up for inpatient stay for SOB 10/18/19 for SOB    Post Hospital Course:  Symptoms improved with increasing her prednisone taper then returned to her regular dose  She had a negative CTA that I had previously ordered. To correct the hospital notes, her Rheumatologist manages her sarcoid. She does not have contact with pain management. She has had chronic pain from her hip and spine, recently having a hip injection. She is pending RHIANNA for her spine and PT but was limited previously with SOB.    Hospital notes express concern about her pain medications, she reports that this takes the edge off and keeps her out of the hospital. She is now taking  The medication more regularly and not waiting for the pain to worsen to take the medication. So far her pain has been controlled since her injection.     Transitional Care Note    Family and/or Caretaker present at visit?  No.  Diagnostic tests reviewed/disposition: I have reviewed all completed as well as pending diagnostic tests at the time of discharge.  Disease/illness education: discussed chronic pain medication  Home health/community services discussion/referrals: Patient does not have home health established from hospital visit.  They do not need home health.  If needed, we will set up home health for the patient.   Establishment or re-establishment of referral orders for community resources: No other necessary community resources.   Discussion with other health care providers: No discussion with other health care providers necessary.             Hospital Course:   Regino Lawrence is a  73 y.o. female very pleasant, who was placed in observation for left sided numbness. In the ED, EKG of sinus rhythm, CT head without acuet hemorrhage or large infarct, chest  x-ray with clear lungs, troponin and BNP negative, UA without leukocytes or nitrites. MRI negative for acute stroke. She was noted to have mild dehydration with decreased GFR=58 which normalized with IV fluids. The patient has known multi-level spondylosis affecting cervical and lumbar spine as well as mostly controlled sarcoid which she reports her PCP controls with oral steroids. She is on prednisone 5 mg daily and states that when she senses a flare she usual contacts her PCP who will generally order higher dose of oral steroids until the flare decreases. She reports that her PCP was out of town this time and she was instructed to present to the ED. Inflammatory markers obtained which sowed elevated sed rate 22 and crp 8.4. She is already taking methotrexate daily along with daily dose of prednisone 5 mg. She does not have regular contact with pulmonologist or rheumatology per her statement.      In early morning the patient reports that her numbness has completely resolved. She was administered IV steroid with intensions to increase her dose at discharge with standard taper down over time to her usual prednisone 5 mg daily. There is no leukocytosis or fever noted which is promising. She is comfortable appearing and does not appear in any acute distress. There is no adenopathy appreciated on exam and her 02 sat on room air remains a consistent 99% with clear lungs. Sentences are congruent without interruption. Seen and evaluated by neurology no new recommendations.     I suspect that the patient may be out of her pain medication (Vicodin) as her last RX shown on the [facility] website was noted to be 9/25/19 - ordered 60 pills monthly, however the patient reports to me that she is taking them three times daily. She already sees pain management and has appointment scheduled for next week 10/23/19. This use may be necessary for the patients' increased pain, however, this increased narcotic usage is risky given her age  and co morbidities. Suspect the extra doses of narcotics daily may be contributory to her report of increased fatigue and endurance, although not altogether the only contribution/explanation for her symptoms. The chronicity of her sarcoid and DJD is also significant to consider. Instructed on the dangers of not taking narcotics as RX'ed. Home on steroid taper (medrol dose pack) then resume her usual prednisone 5 mg daily. She has appropriate and adequate outpatient follow ups scheduled; On chart review patient has appointments with pain management 10/23/19, Cardiology 10/24/19, Chemo 10/25/19, ophthalmology 10/28/19, Hem/Onc 19, and pulmonology 19. Discharge to home in stable condition.     Will route DC summary to her PCP and other relevant stakeholders.        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, without long-term current use of insulin     Depression     Diabetes mellitus, type 2     Eye injury as a child     k-abrasion  od    Hyperlipidemia     Hypertension     Hypothyroidism     Immune deficiency disorder     Immune disorder     Myalgia and myositis 2012    Osteoporosis     Polyneuropathy     Renal manifestation of secondary diabetes mellitus     Sarcoidosis     Ulcer     no cancer    Urinary incontinence        PAST SURGICAL HISTORY:  Past Surgical History:   Procedure Laterality Date    CARPAL TUNNEL RELEASE      Rt wrist    CATARACT EXTRACTION W/  INTRAOCULAR LENS IMPLANT Right 2015    Dr. Azevedo    CATARACT EXTRACTION W/  INTRAOCULAR LENS IMPLANT Left 2015    Dr. Azevedo     SECTION      CHOLECYSTECTOMY       INJECTION OF JOINT Left 3/21/2019    Procedure: Injection, Joint  fLUOROSCOPIC jOINT iNJECTION (hIP iNJECTION) LEFT ROCH BURSA AS WELL LEFT TROCHANTERIC BURSA;  Surgeon: Alfonso Richards MD;  Location: [facility];  Service: Pain Management;  Laterality: Left;  NEEDS CONSENT, DIABETIC    INJECTION OF JOINT Left 7/22/2019    Procedure: Injection, Joint FLUOROSCOPIC JOINT INJECTION (HIP INJECTION) LEFT HIP;  Surgeon: Alfonso Richards MD;  Location: [facility];  Service: Pain Management;  Laterality: Left;  NEEDS CONSENT    INJECTION OF JOINT Left 9/12/2019    Procedure: INJECTION, JOINT;  Surgeon: Alfonso Richards MD;  Location: [facility];  Service: Pain Management;  Laterality: Left;  Left Hip and Left GTB Injections    TUBAL LIGATION         SOCIAL HISTORY:  Social History     Socioeconomic History    Marital status:      Spouse name: Not on file    Number of children: Not on file    Years of education: Not on file    Highest education level: Not on file   Occupational History    Not on file   Social Needs    Financial resource strain: Not on file    Food insecurity:     Worry: Not on file     Inability: Not on file    Transportation needs:     Medical: Not on file     Non-medical: Not on file   Tobacco Use    Smoking status: Never Smoker    Smokeless tobacco: Never Used   Substance and Sexual Activity    Alcohol use: No    Drug use: No    Sexual activity: Not Currently     Partners: Male   Lifestyle    Physical activity:     Days per week: Not on file     Minutes per session: Not on file    Stress: Not on file   Relationships    Social connections:     Talks on phone: Not on file     Gets together: Not on file     Attends [employer] service: Not on file     Active member of club or organization: Not on file     Attends meetings of clubs or organizations: Not on file     Relationship status: Not on file   Other Topics Concern    Not on file   Social History Narrative    Not on file        FAMILY HISTORY:  Family History   Problem Relation Age of Onset    Hypertension Mother     Cataracts Mother     No Known Problems Father     Hypertension Maternal Grandmother     Glaucoma Sister     Arthritis Sister     No Known Problems Brother     No Known Problems Maternal Aunt     No Known Problems Maternal Uncle     No Known Problems Paternal Aunt     No Known Problems Paternal Uncle     No Known Problems Maternal Grandfather     No Known Problems Paternal Grandmother     No Known Problems Paternal Grandfather     Kidney failure Sister     Hepatitis Sister     Cancer Sister         bone cancer     Immunodeficiency Sister     Diabetes Son     Hypertension Son     Lupus Neg Hx     Rheum arthritis Neg Hx     Amblyopia Neg Hx     Blindness Neg Hx     Macular degeneration Neg Hx     Retinal detachment Neg Hx     Strabismus Neg Hx     Stroke Neg Hx     Thyroid disease Neg Hx     Endometrial cancer Neg Hx     Vaginal cancer Neg Hx     Cervical cancer Neg Hx        ALLERGIES AND MEDICATIONS: updated and reviewed.  Review of patient's allergies indicates:   Allergen Reactions    Azathioprine Shortness Of Breath and Other (See Comments)     Fatigue     Current Outpatient Medications   Medication Sig Dispense Refill    ACCU-CHEK FASTCLIX LANCING DEV Kit USE AS DIRECTED. 1 each 0    ALCOHOL ANTISEPTIC PADS (ALCOHOL PREP PADS TOP)       atorvastatin (LIPITOR) 20 MG tablet Take 1 tablet (20 mg total) by mouth once daily. 90 tablet 3    blood sugar diagnostic (ACCU-CHEK SMARTVIEW TEST STRIP) Strp Use as directed to check blood sugar twice daily. 200 strip 3    blood-glucose meter kit Use as instructed 1 each 0    carvedilol (COREG) 25 MG tablet Take 1 tablet (25 mg total) by mouth 2 (two) times daily. 180 tablet 3    cloNIDine (CATAPRES) 0.3 MG tablet Take 1 tablet (0.3 mg total) by mouth 3 (three) times daily. 270 tablet 1    epoetin german (PROCRIT INJ) Inject 1,000 Units as  directed.      fenofibrate 160 MG Tab Take 1 tablet (160 mg total) by mouth once daily. 90 tablet 1    FLUZONE HIGH-DOSE 2019-20, PF, 180 mcg/0.5 mL Syrg PHARMACIST ADMINISTERED IMMUNIZATION ADMINISTERED AT TIME OF DISPENSING  0    folic acid (FOLVITE) 1 MG tablet Take 1 tablet (1,000 mcg total) by mouth once daily. 90 tablet 0    gabapentin (NEURONTIN) 400 MG capsule Take 1 capsule (400 mg total) by mouth 3 (three) times daily. 90 capsule 1    HYDROcodone-acetaminophen (NORCO) 5-325 mg per tablet Take 1 tablet by mouth every 6 (six) hours as needed. 90 tablet 0    levothyroxine (SYNTHROID) 50 MCG tablet TAKE 1 TABLET BY MOUTH ONCE DAILY BEFORE BREAKFAST 90 tablet 1    metFORMIN (GLUCOPHAGE) 1000 MG tablet TAKE 1 TABLET BY MOUTH TWICE DAILY WITH MEALS 180 tablet 1    methotrexate 2.5 MG Tab TAKE 6 TABLETS BY MOUTH EVERY 7 DAYS 24 tablet 1    NIFEdipine (PROCARDIA-XL) 30 MG (OSM) 24 hr tablet Take 1 tablet (30 mg total) by mouth once daily. 90 tablet 1    oxybutynin (DITROPAN-XL) 5 MG TR24 Take 1 tablet (5 mg total) by mouth once daily. 30 tablet 11    predniSONE (DELTASONE) 5 MG tablet Take 1 tablet (5 mg total) by mouth once daily. 90 tablet 1    tiZANidine (ZANAFLEX) 2 MG tablet Take 2 tablets (4 mg total) by mouth every 8 (eight) hours as needed. 270 tablet 0    calcium carbonate (OS-MALCOLM) 600 mg calcium (1,500 mg) Tab Take 1 tablet by mouth 2 (two) times daily.       diaper,brief,adult,disposable Misc        No current facility-administered medications for this visit.        ROS  Review of Systems   Constitutional: Negative for chills, diaphoresis, fatigue, fever and unexpected weight change.   HENT: Negative for rhinorrhea, sinus pressure, sore throat and tinnitus.    Eyes: Negative for photophobia and visual disturbance.   Respiratory: Positive for shortness of breath. Negative for cough and wheezing.    Cardiovascular: Negative for chest pain and palpitations.   Gastrointestinal: Negative for  "abdominal pain, blood in stool, constipation, diarrhea, nausea and vomiting.   Genitourinary: Negative for dysuria, flank pain, frequency and vaginal discharge.   Musculoskeletal: Positive for arthralgias and back pain. Negative for joint swelling.   Skin: Negative for rash.   Neurological: Negative for speech difficulty, weakness, light-headedness and headaches.   Psychiatric/Behavioral: Negative for behavioral problems and dysphoric mood.       Physical Exam  Vitals:    11/07/19 1048   BP: 130/64   BP Location: Right arm   Patient Position: Sitting   BP Method: Medium (Manual)   Pulse: 92   Temp: 98.4 °F (36.9 °C)   TempSrc: Oral   SpO2: 96%   Weight: 57.9 kg (127 lb 10.3 oz)   Height: 5' 1" (1.549 m)    Body mass index is 24.12 kg/m².  Weight: 57.9 kg (127 lb 10.3 oz)   Height: 5' 1" (154.9 cm)     Physical Exam   Constitutional: She is oriented to person, place, and time. She appears well-developed and well-nourished. No distress.   HENT:   Head: Normocephalic and atraumatic.   Eyes: EOM are normal.   Neck: Neck supple.   Cardiovascular: Normal rate and regular rhythm. Exam reveals no gallop and no friction rub.   No murmur heard.  Pulmonary/Chest: Effort normal and breath sounds normal. No respiratory distress. She has no wheezes. She has no rales.   Lymphadenopathy:     She has no cervical adenopathy.   Neurological: She is alert and oriented to person, place, and time.   Skin: Skin is warm and dry. No rash noted.   Psychiatric: She has a normal mood and affect. Her behavior is normal.   Nursing note and vitals reviewed.      Health Maintenance       Date Due Completion Date    Shingles Vaccine (2 of 3) 07/20/2015 5/25/2015    Urine Microalbumin 09/12/2019 9/12/2018    Hemoglobin A1c 03/20/2020 9/20/2019    DEXA SCAN 05/05/2020 5/5/2017    Foot Exam 06/13/2020 6/13/2019 (Done)    Override on 6/13/2019: Done    Override on 6/19/2018: Done    Override on 5/12/2017: Done    Eye Exam 06/24/2020 6/24/2019    " Mammogram 07/25/2020 7/25/2019    Lipid Panel 10/18/2020 10/18/2019    Colonoscopy 02/09/2025 2/9/2015 (Done)    Override on 2/9/2015: Done    Override on 2/18/2005: Done (reportedly normal)    TETANUS VACCINE 05/16/2026 5/16/2016          Health maintenance reviewed and addressed as ordered      ASSESSMENT     1. Hospital discharge follow-up    2. FAUSTIN (dyspnea on exertion)    3. Current use of steroid medication    4. Chronic bilateral low back pain with left-sided sciatica    5. Controlled type 2 diabetes mellitus with complication, without long-term current use of insulin    6. Sarcoidosis        PLAN:     Problem List Items Addressed This Visit        Immunology/Multi System    Sarcoidosis (Chronic)  -continue current prednisone dose of 5mg  -has pulm f/u on Nov 20       Endocrine    Diabetes mellitus type 2, controlled (Chronic)  -continue current regimen  Lab Results   Component Value Date    HGBA1C 6.2 (H) 09/20/2019            Orthopedic    Chronic bilateral low back pain with left-sided sciatica  -f/u with pain management for RHIANNA and hip injections  -discussed compliance with medication dosing and frequency every 6 hours to stay on top of pain and prevent crises if possible  -will resume PT       Other    Current use of steroid medication  -stable    FAUSTIN (dyspnea on exertion)  \-has improved with steroid taper, has pulm f/u      Other Visit Diagnoses     Hospital discharge follow-up    -  Primary  -stable s/p discharge            Micaela Mendoza MD  11/07/2019 10:25 AM        Follow up in about 3 months (around 2/7/2020) for Follow up.

## 2019-11-08 ENCOUNTER — INFUSION (OUTPATIENT)
Dept: INFUSION THERAPY | Facility: HOSPITAL | Age: 74
End: 2019-11-08
Attending: INTERNAL MEDICINE
Payer: MEDICARE

## 2019-11-08 VITALS
OXYGEN SATURATION: 96 % | DIASTOLIC BLOOD PRESSURE: 74 MMHG | TEMPERATURE: 97 F | HEART RATE: 86 BPM | RESPIRATION RATE: 17 BRPM | SYSTOLIC BLOOD PRESSURE: 155 MMHG

## 2019-11-08 DIAGNOSIS — Z53.1 REFUSAL OF BLOOD TRANSFUSIONS AS PATIENT IS JEHOVAH'S WITNESS: ICD-10-CM

## 2019-11-08 DIAGNOSIS — D50.9 IRON DEFICIENCY ANEMIA, UNSPECIFIED IRON DEFICIENCY ANEMIA TYPE: ICD-10-CM

## 2019-11-08 DIAGNOSIS — N18.9 ANEMIA IN CHRONIC KIDNEY DISEASE, UNSPECIFIED CKD STAGE: Primary | ICD-10-CM

## 2019-11-08 DIAGNOSIS — D63.1 ANEMIA IN CHRONIC KIDNEY DISEASE, UNSPECIFIED CKD STAGE: Primary | ICD-10-CM

## 2019-11-08 PROCEDURE — 96372 THER/PROPH/DIAG INJ SC/IM: CPT | Mod: HCNC

## 2019-11-08 PROCEDURE — 63600175 PHARM REV CODE 636 W HCPCS: Mod: HCNC | Performed by: INTERNAL MEDICINE

## 2019-11-08 RX ADMIN — EPOETIN ALFA-EPBX 20000 UNITS: 10000 INJECTION, SOLUTION INTRAVENOUS; SUBCUTANEOUS at 12:11

## 2019-11-08 NOTE — PLAN OF CARE
Patient arrived to unit for Retacrit injection. Plan of care reviewed, patient agreeable to plan. Patient tolerated injection well. Patient stated she has been feeling great and that her SOB went away after she took her steroids. VSS. Discharge instructions reviewed, patient instructed to return 11/22/19 for labs and next injection. Patient left unit via electric mobility scooter accompanied by family. Patient in NAD at time of discharge.

## 2019-11-15 DIAGNOSIS — E11.9 TYPE 2 DIABETES MELLITUS WITHOUT COMPLICATION: ICD-10-CM

## 2019-11-18 ENCOUNTER — TELEPHONE (OUTPATIENT)
Dept: PAIN MEDICINE | Facility: CLINIC | Age: 74
End: 2019-11-18

## 2019-11-18 NOTE — TELEPHONE ENCOUNTER
----- Message from Martinez Silver sent at 11/18/2019 12:34 PM CST -----  Contact: CANDELARIA SANTANA [[id number]]  Name of Who is Calling: CANDELARIA SANTANA [[id number]]     What is the request in detail:CANDELARIA SANTANA [[id number]] is requesting a call back in regards to rescheduling procedure ....  Please contact to further discuss and advise      Can the clinic reply by [facility]: no    [facility]:  [phone]

## 2019-11-21 ENCOUNTER — OFFICE VISIT (OUTPATIENT)
Dept: PULMONOLOGY | Facility: CLINIC | Age: 74
End: 2019-11-21
Payer: MEDICARE

## 2019-11-21 VITALS
OXYGEN SATURATION: 97 % | HEIGHT: 61 IN | WEIGHT: 126.13 LBS | HEART RATE: 96 BPM | SYSTOLIC BLOOD PRESSURE: 182 MMHG | BODY MASS INDEX: 23.81 KG/M2 | DIASTOLIC BLOOD PRESSURE: 78 MMHG

## 2019-11-21 DIAGNOSIS — D86.9 SARCOIDOSIS: Primary | ICD-10-CM

## 2019-11-21 PROCEDURE — 99204 PR OFFICE/OUTPT VISIT, NEW, LEVL IV, 45-59 MIN: ICD-10-PCS | Mod: HCNC,S$GLB,, | Performed by: INTERNAL MEDICINE

## 2019-11-21 PROCEDURE — 1159F MED LIST DOCD IN RCRD: CPT | Mod: HCNC,S$GLB,, | Performed by: INTERNAL MEDICINE

## 2019-11-21 PROCEDURE — 1159F PR MEDICATION LIST DOCUMENTED IN MEDICAL RECORD: ICD-10-PCS | Mod: HCNC,S$GLB,, | Performed by: INTERNAL MEDICINE

## 2019-11-21 PROCEDURE — 3078F PR MOST RECENT DIASTOLIC BLOOD PRESSURE < 80 MM HG: ICD-10-PCS | Mod: HCNC,CPTII,S$GLB, | Performed by: INTERNAL MEDICINE

## 2019-11-21 PROCEDURE — 3078F DIAST BP <80 MM HG: CPT | Mod: HCNC,CPTII,S$GLB, | Performed by: INTERNAL MEDICINE

## 2019-11-21 PROCEDURE — 99999 PR PBB SHADOW E&M-EST. PATIENT-LVL II: CPT | Mod: PBBFAC,HCNC,, | Performed by: INTERNAL MEDICINE

## 2019-11-21 PROCEDURE — 99204 OFFICE O/P NEW MOD 45 MIN: CPT | Mod: HCNC,S$GLB,, | Performed by: INTERNAL MEDICINE

## 2019-11-21 PROCEDURE — 1126F AMNT PAIN NOTED NONE PRSNT: CPT | Mod: HCNC,S$GLB,, | Performed by: INTERNAL MEDICINE

## 2019-11-21 PROCEDURE — 1101F PR PT FALLS ASSESS DOC 0-1 FALLS W/OUT INJ PAST YR: ICD-10-PCS | Mod: HCNC,CPTII,S$GLB, | Performed by: INTERNAL MEDICINE

## 2019-11-21 PROCEDURE — 3077F SYST BP >= 140 MM HG: CPT | Mod: HCNC,CPTII,S$GLB, | Performed by: INTERNAL MEDICINE

## 2019-11-21 PROCEDURE — 3077F PR MOST RECENT SYSTOLIC BLOOD PRESSURE >= 140 MM HG: ICD-10-PCS | Mod: HCNC,CPTII,S$GLB, | Performed by: INTERNAL MEDICINE

## 2019-11-21 PROCEDURE — 1101F PT FALLS ASSESS-DOCD LE1/YR: CPT | Mod: HCNC,CPTII,S$GLB, | Performed by: INTERNAL MEDICINE

## 2019-11-21 PROCEDURE — 1126F PR PAIN SEVERITY QUANTIFIED, NO PAIN PRESENT: ICD-10-PCS | Mod: HCNC,S$GLB,, | Performed by: INTERNAL MEDICINE

## 2019-11-21 PROCEDURE — 99999 PR PBB SHADOW E&M-EST. PATIENT-LVL II: ICD-10-PCS | Mod: PBBFAC,HCNC,, | Performed by: INTERNAL MEDICINE

## 2019-11-21 NOTE — PROGRESS NOTES
Subjective:       Patient ID: Regino Lawrence is a 74 y.o. female.    Chief Complaint: Shortness of Breath    74 year old female with sarcoidosis who presents for evaluation of Sarcoid. Patient was diagnosed at [facility] in [facility]. She is currently on prednisone and methotrexate.   Denies fever or chills.   Patient was hospitalized in October for stroke like symptoms and SOB.   She has not seen a pulmonologist in many years.   Received medrol dose pack in October.     Review of Systems   Constitutional: Negative for activity change and appetite change.   HENT: Negative for rhinorrhea and congestion.    Respiratory: Negative for cough, shortness of breath and dyspnea on extertion.    Cardiovascular: Negative for chest pain.   Endocrine: Negative for cold intolerance.    Musculoskeletal: Negative for arthralgias and back pain.   Skin: Negative for rash.   Gastrointestinal: Negative for abdominal pain and abdominal distention.   Neurological: Negative for dizziness.   Hematological: Negative for adenopathy. Does not bruise/bleed easily.   Psychiatric/Behavioral: Negative for confusion.       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, without long-term current use of insulin     Depression     Diabetes mellitus, type 2     Eye injury as a child     k-abrasion  od    Hyperlipidemia     Hypertension     Hypothyroidism     Immune deficiency disorder     Immune disorder     Myalgia and myositis 9/6/2012    Osteoporosis     Polyneuropathy     Renal manifestation of secondary diabetes mellitus     Sarcoidosis     Ulcer     no cancer    Urinary incontinence      Past Surgical History:   Procedure  Laterality Date    CARPAL TUNNEL RELEASE      Rt wrist    CATARACT EXTRACTION W/  INTRAOCULAR LENS IMPLANT Right 2015    Dr. Azevedo    CATARACT EXTRACTION W/  INTRAOCULAR LENS IMPLANT Left 2015    Dr. Azevedo     SECTION      CHOLECYSTECTOMY      INJECTION OF JOINT Left 3/21/2019    Procedure: Injection, Joint  fLUOROSCOPIC jOINT iNJECTION (hIP iNJECTION) LEFT ROCH BURSA AS WELL LEFT TROCHANTERIC BURSA;  Surgeon: Alfonso Richards MD;  Location: [facility] PAIN [facility]GT;  Service: Pain Management;  Laterality: Left;  NEEDS CONSENT, DIABETIC    INJECTION OF JOINT Left 2019    Procedure: Injection, Joint FLUOROSCOPIC JOINT INJECTION (HIP INJECTION) LEFT HIP;  Surgeon: Alfonso Richards MD;  Location: [facility] PAIN [facility]GT;  Service: Pain Management;  Laterality: Left;  NEEDS CONSENT    INJECTION OF JOINT Left 2019    Procedure: INJECTION, JOINT;  Surgeon: Alfonso Richards MD;  Location: [facility] PAIN [facility]GT;  Service: Pain Management;  Laterality: Left;  Left Hip and Left GTB Injections    TUBAL LIGATION       Social History     Socioeconomic History    Marital status:      Spouse name: Not on file    Number of children: Not on file    Years of education: Not on file    Highest education level: Not on file   Occupational History    Not on file   Social Needs    Financial resource strain: Not on file    Food insecurity:     Worry: Not on file     Inability: Not on file    Transportation needs:     Medical: Not on file     Non-medical: Not on file   Tobacco Use    Smoking status: Never Smoker    Smokeless tobacco: Never Used   Substance and Sexual Activity    Alcohol use: No    Drug use: No    Sexual activity: Not Currently     Partners: Male   Lifestyle    Physical activity:     Days per week: Not on file     Minutes per session: Not on file    Stress: Not on file   Relationships    Social connections:     Talks on phone: Not on file     Gets together: Not on file     Attends Scientologist  "service: Not on file     Active member of club or organization: Not on file     Attends meetings of clubs or organizations: Not on file     Relationship status: Not on file   Other Topics Concern    Not on file   Social History Narrative    Not on file     Family History   Problem Relation Age of Onset    Hypertension Mother     Cataracts Mother     No Known Problems Father     Hypertension Maternal Grandmother     Glaucoma Sister     Arthritis Sister     No Known Problems Brother     No Known Problems Maternal Aunt     No Known Problems Maternal Uncle     No Known Problems Paternal Aunt     No Known Problems Paternal Uncle     No Known Problems Maternal Grandfather     No Known Problems Paternal Grandmother     No Known Problems Paternal Grandfather     Kidney failure Sister     Hepatitis Sister     Cancer Sister         bone cancer     Immunodeficiency Sister     Diabetes Son     Hypertension Son     Lupus Neg Hx     Rheum arthritis Neg Hx     Amblyopia Neg Hx     Blindness Neg Hx     Macular degeneration Neg Hx     Retinal detachment Neg Hx     Strabismus Neg Hx     Stroke Neg Hx     Thyroid disease Neg Hx     Endometrial cancer Neg Hx     Vaginal cancer Neg Hx     Cervical cancer Neg Hx        Objective:       Vitals:    11/21/19 1405   BP: (!) 182/78   Pulse: 96   SpO2: 97%   Weight: 57.2 kg (126 lb 1.7 oz)   Height: 5' 1" (1.549 m)   PainSc: 0-No pain       Physical Exam   Constitutional: She is oriented to person, place, and time. She appears well-developed and well-nourished. No distress.   HENT:   Head: Normocephalic.   Nose: Nose normal.   Neck: Normal range of motion. Neck supple.   Cardiovascular: Normal rate and regular rhythm.   Pulmonary/Chest: Normal expansion and effort normal.   Abdominal: Soft. Bowel sounds are normal.   Musculoskeletal: She exhibits no edema.   Neurological: She is alert and oriented to person, place, and time.   Skin: Skin is warm and dry. She " is not diaphoretic.   Psychiatric: She has a normal mood and affect. Her behavior is normal.   Nursing note and vitals reviewed.    Personal Diagnostic Review  none pertinent  No flowsheet data found.      Assessment:       No diagnosis found.    Outpatient Encounter Medications as of 11/21/2019   Medication Sig Dispense Refill    ACCU-CHEK FASTCLIX LANCING DEV Kit USE AS DIRECTED. 1 each 0    ALCOHOL ANTISEPTIC PADS (ALCOHOL PREP PADS TOP)       atorvastatin (LIPITOR) 20 MG tablet Take 1 tablet (20 mg total) by mouth once daily. 90 tablet 3    blood sugar diagnostic (ACCU-CHEK SMARTVIEW TEST STRIP) Strp Use as directed to check blood sugar twice daily. 200 strip 3    blood-glucose meter kit Use as instructed 1 each 0    calcium carbonate (OS-MALCOLM) 600 mg calcium (1,500 mg) Tab Take 1 tablet by mouth 2 (two) times daily.       carvedilol (COREG) 25 MG tablet Take 1 tablet (25 mg total) by mouth 2 (two) times daily. 180 tablet 3    cloNIDine (CATAPRES) 0.3 MG tablet Take 1 tablet (0.3 mg total) by mouth 3 (three) times daily. 270 tablet 1    diaper,brief,adult,disposable Misc       epoetin german (PROCRIT INJ) Inject 1,000 Units as directed.      fenofibrate 160 MG Tab Take 1 tablet (160 mg total) by mouth once daily. 90 tablet 1    FLUZONE HIGH-DOSE 2019-20, PF, 180 mcg/0.5 mL Syrg PHARMACIST ADMINISTERED IMMUNIZATION ADMINISTERED AT TIME OF DISPENSING  0    folic acid (FOLVITE) 1 MG tablet Take 1 tablet (1,000 mcg total) by mouth once daily. 90 tablet 0    gabapentin (NEURONTIN) 400 MG capsule Take 1 capsule (400 mg total) by mouth 3 (three) times daily. 90 capsule 1    HYDROcodone-acetaminophen (NORCO) 5-325 mg per tablet Take 1 tablet by mouth every 6 (six) hours as needed. 90 tablet 0    levothyroxine (SYNTHROID) 50 MCG tablet TAKE 1 TABLET BY MOUTH ONCE DAILY BEFORE BREAKFAST 90 tablet 1    metFORMIN (GLUCOPHAGE) 1000 MG tablet TAKE 1 TABLET BY MOUTH TWICE DAILY WITH MEALS 180 tablet 1     methotrexate 2.5 MG Tab TAKE 6 TABLETS BY MOUTH EVERY 7 DAYS 24 tablet 1    NIFEdipine (PROCARDIA-XL) 30 MG (OSM) 24 hr tablet Take 1 tablet (30 mg total) by mouth once daily. 90 tablet 1    oxybutynin (DITROPAN-XL) 5 MG TR24 Take 1 tablet (5 mg total) by mouth once daily. 30 tablet 11    predniSONE (DELTASONE) 5 MG tablet Take 1 tablet (5 mg total) by mouth once daily. 90 tablet 1    tiZANidine (ZANAFLEX) 2 MG tablet Take 2 tablets (4 mg total) by mouth every 8 (eight) hours as needed. 270 tablet 0     No facility-administered encounter medications on file as of 11/21/2019.      No orders of the defined types were placed in this encounter.      Plan:          Sarcoidosis  Patient currently controlled on prednisone and methotrexate.   Recommend: PFTs for evaluation.     Follow up in 6 months.     Bijal Morrison MD

## 2019-11-22 ENCOUNTER — INFUSION (OUTPATIENT)
Dept: INFUSION THERAPY | Facility: HOSPITAL | Age: 74
End: 2019-11-22
Attending: INTERNAL MEDICINE
Payer: MEDICARE

## 2019-11-22 ENCOUNTER — LAB VISIT (OUTPATIENT)
Dept: LAB | Facility: HOSPITAL | Age: 74
End: 2019-11-22
Attending: INTERNAL MEDICINE
Payer: MEDICARE

## 2019-11-22 VITALS
OXYGEN SATURATION: 97 % | HEART RATE: 81 BPM | RESPIRATION RATE: 17 BRPM | DIASTOLIC BLOOD PRESSURE: 67 MMHG | TEMPERATURE: 98 F | SYSTOLIC BLOOD PRESSURE: 124 MMHG

## 2019-11-22 DIAGNOSIS — N18.9 ANEMIA IN CHRONIC KIDNEY DISEASE, UNSPECIFIED CKD STAGE: Primary | ICD-10-CM

## 2019-11-22 DIAGNOSIS — Z53.1 REFUSAL OF BLOOD TRANSFUSIONS AS PATIENT IS JEHOVAH'S WITNESS: ICD-10-CM

## 2019-11-22 DIAGNOSIS — N18.9 ANEMIA IN CHRONIC KIDNEY DISEASE, UNSPECIFIED CKD STAGE: ICD-10-CM

## 2019-11-22 DIAGNOSIS — D50.9 IRON DEFICIENCY ANEMIA, UNSPECIFIED IRON DEFICIENCY ANEMIA TYPE: ICD-10-CM

## 2019-11-22 DIAGNOSIS — D63.1 ANEMIA IN CHRONIC KIDNEY DISEASE, UNSPECIFIED CKD STAGE: Primary | ICD-10-CM

## 2019-11-22 DIAGNOSIS — D63.1 ANEMIA IN CHRONIC KIDNEY DISEASE, UNSPECIFIED CKD STAGE: ICD-10-CM

## 2019-11-22 LAB
BASOPHILS # BLD AUTO: 0.03 K/UL (ref 0–0.2)
BASOPHILS NFR BLD: 0.5 % (ref 0–1.9)
DIFFERENTIAL METHOD: ABNORMAL
EOSINOPHIL # BLD AUTO: 0.1 K/UL (ref 0–0.5)
EOSINOPHIL NFR BLD: 1.7 % (ref 0–8)
ERYTHROCYTE [DISTWIDTH] IN BLOOD BY AUTOMATED COUNT: 13.6 % (ref 11.5–14.5)
HCT VFR BLD AUTO: 31.9 % (ref 37–48.5)
HGB BLD-MCNC: 10.4 G/DL (ref 12–16)
IMM GRANULOCYTES # BLD AUTO: 0.04 K/UL (ref 0–0.04)
IMM GRANULOCYTES NFR BLD AUTO: 0.7 % (ref 0–0.5)
LYMPHOCYTES # BLD AUTO: 1.1 K/UL (ref 1–4.8)
LYMPHOCYTES NFR BLD: 17.9 % (ref 18–48)
MCH RBC QN AUTO: 34.7 PG (ref 27–31)
MCHC RBC AUTO-ENTMCNC: 32.6 G/DL (ref 32–36)
MCV RBC AUTO: 106 FL (ref 82–98)
MONOCYTES # BLD AUTO: 0.3 K/UL (ref 0.3–1)
MONOCYTES NFR BLD: 5.6 % (ref 4–15)
NEUTROPHILS # BLD AUTO: 4.3 K/UL (ref 1.8–7.7)
NEUTROPHILS NFR BLD: 73.6 % (ref 38–73)
NRBC BLD-RTO: 0 /100 WBC
PLATELET # BLD AUTO: 266 K/UL (ref 150–350)
PMV BLD AUTO: 9.1 FL (ref 9.2–12.9)
RBC # BLD AUTO: 3 M/UL (ref 4–5.4)
WBC # BLD AUTO: 5.88 K/UL (ref 3.9–12.7)

## 2019-11-22 PROCEDURE — 85025 COMPLETE CBC W/AUTO DIFF WBC: CPT | Mod: HCNC

## 2019-11-22 PROCEDURE — 96372 THER/PROPH/DIAG INJ SC/IM: CPT | Mod: HCNC

## 2019-11-22 PROCEDURE — 63600175 PHARM REV CODE 636 W HCPCS: Mod: HCNC | Performed by: INTERNAL MEDICINE

## 2019-11-22 RX ADMIN — EPOETIN ALFA-EPBX 20000 UNITS: 10000 INJECTION, SOLUTION INTRAVENOUS; SUBCUTANEOUS at 12:11

## 2019-11-22 NOTE — PLAN OF CARE
Pt presented for Retacrit injection. VSS. Hgb 10.4 today. Pt reported fatigue/weakness, numbness/tingling to hands/feet, and pain in legs (pt is scheduled to receive another steroid injection for this). Tolerated Retacrit. Accompanied by daughter. Distress screening tool completed. AVS provided and reviewed with pt. Pt in motorized scooter for visit. Pt in NAD at time of discharge.

## 2019-11-26 ENCOUNTER — HOSPITAL ENCOUNTER (OUTPATIENT)
Dept: PULMONOLOGY | Facility: CLINIC | Age: 74
Discharge: HOME | End: 2019-11-26
Payer: MEDICARE

## 2019-11-26 DIAGNOSIS — D86.9 SARCOIDOSIS: ICD-10-CM

## 2019-11-26 LAB
DLCO ADJ PRE: 15.4 ML/(MIN*MMHG) (ref 13.12–24.58)
DLCO SINGLE BREATH LLN: 13.12
DLCO SINGLE BREATH PRE REF: 73 %
DLCO SINGLE BREATH REF: 18.85
DLCOC SBVA LLN: 2.64
DLCOC SBVA PRE REF: 104.2 %
DLCOC SBVA REF: 4.25
DLCOC SINGLE BREATH LLN: 13.12
DLCOC SINGLE BREATH PRE REF: 81.7 %
DLCOC SINGLE BREATH REF: 18.85
DLCOCSBVAULN: 5.85
DLCOCSINGLEBREATHULN: 24.58
DLCOSINGLEBREATHULN: 24.58
DLCOVA LLN: 2.64
DLCOVA PRE REF: 93.1 %
DLCOVA PRE: 3.95 ML/(MIN*MMHG*L) (ref 2.64–5.85)
DLCOVA REF: 4.25
DLCOVAULN: 5.85
DLVAADJ PRE: 4.42 ML/(MIN*MMHG*L) (ref 2.64–5.85)
ERV LLN: 0.56
ERV REF: 0.56
ERVN2 LLN: 0.56
ERVN2 PRE REF: 46.7 %
ERVN2 PRE: 0.26 L (ref 0.56–0.56)
ERVN2 REF: 0.56
ERVN2ULN: 0.56
ERVULN: 0.56
FEF 25 75 LLN: 0.51
FEF 25 75 PRE REF: 101.1 %
FEF 25 75 REF: 1.43
FEV05 LLN: 0.65
FEV05 REF: 1.5
FEV1 FVC LLN: 65
FEV1 FVC PRE REF: 98.8 %
FEV1 FVC REF: 78
FEV1 LLN: 1.12
FEV1 PRE REF: 99.6 %
FEV1 REF: 1.63
FRCN2 LLN: 1.72
FRCN2 PRE REF: 56.2 %
FRCN2 REF: 2.55
FRCN2ULN: 3.37
FRCPLETH LLN: 1.72
FRCPLETH REF: 2.55
FRCPLETHULN: 3.37
FVC LLN: 1.45
FVC PRE REF: 100.2 %
FVC REF: 2.09
IVC PRE: 2.15 L (ref 1.45–2.73)
IVC SINGLE BREATH LLN: 1.45
IVC SINGLE BREATH PRE REF: 102.6 %
IVC SINGLE BREATH REF: 2.09
IVCSINGLEBREATHULN: 2.73
MVV LLN: 57
MVV REF: 67
PEF LLN: 2.2
PEF PRE REF: 129.6 %
PEF REF: 4.02
PRE DLCO: 13.77 ML/(MIN*MMHG) (ref 13.12–24.58)
PRE FEF 25 75: 1.45 L/S (ref 0.51–2.35)
PRE FET 100: 6.39 SEC
PRE FEV05 REF: 91.4 %
PRE FEV1 FVC: 77.55 % (ref 64.96–91.99)
PRE FEV1: 1.62 L (ref 1.12–2.14)
PRE FEV5: 1.37 L (ref 0.65–2.36)
PRE FRC N2: 1.43 L
PRE FVC: 2.09 L (ref 1.45–2.73)
PRE PEF: 5.22 L/S (ref 2.2–5.85)
RV LLN: 1.41
RV REF: 1.99
RVN2 LLN: 1.41
RVN2 PRE REF: 58.8 %
RVN2 PRE: 1.17 L (ref 1.41–2.57)
RVN2 REF: 1.99
RVN2TLCN2 LLN: 34.53
RVN2TLCN2 PRE REF: 81.3 %
RVN2TLCN2 PRE: 35.89 % (ref 34.53–53.71)
RVN2TLCN2 REF: 44.12
RVN2TLCN2ULN: 53.71
RVN2ULN: 2.57
RVTLC LLN: 35
RVTLC REF: 44
RVTLCULN: 54
RVULN: 2.57
TLC LLN: 3.45
TLC REF: 4.44
TLC ULN: 5.43
TLCN2 LLN: 3.45
TLCN2 PRE REF: 73.4 %
TLCN2 PRE: 3.26 L (ref 3.45–5.43)
TLCN2 REF: 4.44
TLCN2ULN: 5.43
VA PRE: 3.48 L (ref 4.29–4.29)
VA SINGLE BREATH LLN: 4.29
VA SINGLE BREATH PRE REF: 81.2 %
VA SINGLE BREATH REF: 4.29
VASINGLEBREATHULN: 4.29
VC LLN: 1.45
VC REF: 2.09
VC ULN: 2.73
VCMAXN2 LLN: 1.45
VCMAXN2 PRE REF: 100 %
VCMAXN2 PRE: 2.09 L (ref 1.45–2.73)
VCMAXN2 REF: 2.09
VCMAXN2ULN: 2.73

## 2019-11-26 PROCEDURE — 94010 BREATHING CAPACITY TEST: ICD-10-PCS | Mod: HCNC,S$GLB,, | Performed by: INTERNAL MEDICINE

## 2019-11-26 PROCEDURE — 94729 PR C02/MEMBANE DIFFUSE CAPACITY: ICD-10-PCS | Mod: HCNC,S$GLB,, | Performed by: INTERNAL MEDICINE

## 2019-11-26 PROCEDURE — 94729 DIFFUSING CAPACITY: CPT | Mod: HCNC,S$GLB,, | Performed by: INTERNAL MEDICINE

## 2019-11-26 PROCEDURE — 94010 BREATHING CAPACITY TEST: CPT | Mod: HCNC,S$GLB,, | Performed by: INTERNAL MEDICINE

## 2019-11-26 PROCEDURE — 94727 GAS DIL/WSHOT DETER LNG VOL: CPT | Mod: HCNC,S$GLB,, | Performed by: INTERNAL MEDICINE

## 2019-11-26 PROCEDURE — 94727 PR PULM FUNCTION TEST BY GAS: ICD-10-PCS | Mod: HCNC,S$GLB,, | Performed by: INTERNAL MEDICINE

## 2019-12-02 DIAGNOSIS — M79.10 MYALGIA: ICD-10-CM

## 2019-12-02 RX ORDER — TIZANIDINE 2 MG/1
4 TABLET ORAL EVERY 8 HOURS PRN
Qty: 270 TABLET | Refills: 0 | Status: SHIPPED | OUTPATIENT
Start: 2019-12-02 | End: 2020-02-19 | Stop reason: SDUPTHER

## 2019-12-05 ENCOUNTER — HOSPITAL ENCOUNTER (OUTPATIENT)
Facility: OTHER | Age: 74
Discharge: HOME | End: 2019-12-05
Attending: ANESTHESIOLOGY | Admitting: ANESTHESIOLOGY
Payer: MEDICARE

## 2019-12-05 VITALS
DIASTOLIC BLOOD PRESSURE: 86 MMHG | RESPIRATION RATE: 18 BRPM | WEIGHT: 127 LBS | HEART RATE: 88 BPM | HEIGHT: 60 IN | TEMPERATURE: 98 F | OXYGEN SATURATION: 95 % | SYSTOLIC BLOOD PRESSURE: 156 MMHG | BODY MASS INDEX: 24.94 KG/M2

## 2019-12-05 DIAGNOSIS — G89.29 CHRONIC PAIN: ICD-10-CM

## 2019-12-05 DIAGNOSIS — M54.16 LUMBAR RADICULOPATHY: Primary | ICD-10-CM

## 2019-12-05 LAB — POCT GLUCOSE: 119 MG/DL (ref 70–110)

## 2019-12-05 PROCEDURE — 64483 NJX AA&/STRD TFRM EPI L/S 1: CPT | Mod: 50,HCNC | Performed by: ANESTHESIOLOGY

## 2019-12-05 PROCEDURE — 64483 NJX AA&/STRD TFRM EPI L/S 1: CPT | Mod: 50,HCNC,, | Performed by: ANESTHESIOLOGY

## 2019-12-05 PROCEDURE — 64483 PR EPIDURAL INJ, ANES/STEROID, TRANSFORAMINAL, LUMB/SACR, SNGL LEVL: ICD-10-PCS | Mod: 50,HCNC,, | Performed by: ANESTHESIOLOGY

## 2019-12-05 PROCEDURE — 63600175 PHARM REV CODE 636 W HCPCS: Mod: HCNC | Performed by: ANESTHESIOLOGY

## 2019-12-05 PROCEDURE — 25000003 PHARM REV CODE 250: Mod: HCNC | Performed by: ANESTHESIOLOGY

## 2019-12-05 PROCEDURE — 82947 ASSAY GLUCOSE BLOOD QUANT: CPT | Mod: HCNC | Performed by: ANESTHESIOLOGY

## 2019-12-05 PROCEDURE — 25500020 PHARM REV CODE 255: Mod: HCNC | Performed by: ANESTHESIOLOGY

## 2019-12-05 RX ORDER — LIDOCAINE HYDROCHLORIDE 10 MG/ML
INJECTION INFILTRATION; PERINEURAL
Status: DISCONTINUED | OUTPATIENT
Start: 2019-12-05 | End: 2019-12-05 | Stop reason: HOSPADM

## 2019-12-05 RX ORDER — LIDOCAINE HYDROCHLORIDE 10 MG/ML
INJECTION, SOLUTION EPIDURAL; INFILTRATION; INTRACAUDAL; PERINEURAL
Status: DISCONTINUED | OUTPATIENT
Start: 2019-12-05 | End: 2019-12-05 | Stop reason: HOSPADM

## 2019-12-05 RX ORDER — DEXAMETHASONE SODIUM PHOSPHATE 100 MG/10ML
INJECTION INTRAMUSCULAR; INTRAVENOUS
Status: DISCONTINUED | OUTPATIENT
Start: 2019-12-05 | End: 2019-12-05 | Stop reason: HOSPADM

## 2019-12-05 RX ORDER — SODIUM CHLORIDE 9 MG/ML
500 INJECTION, SOLUTION INTRAVENOUS CONTINUOUS
Status: DISCONTINUED | OUTPATIENT
Start: 2019-12-05 | End: 2019-12-05 | Stop reason: HOSPADM

## 2019-12-05 RX ORDER — ALPRAZOLAM 0.5 MG/1
0.5 TABLET ORAL ONCE
Status: COMPLETED | OUTPATIENT
Start: 2019-12-05 | End: 2019-12-05

## 2019-12-05 RX ADMIN — ALPRAZOLAM 0.5 MG: 0.5 TABLET ORAL at 02:12

## 2019-12-05 NOTE — BRIEF OP NOTE
Discharge Note  Short Stay      SUMMARY     Admit Date: 12/5/2019    Attending Physician: Alfonso Richards      Discharge Physician: Alfonso Richards      Discharge Date: 12/5/2019 3:21 PM    Procedure(s) (LRB):  INJECTION, STEROID, EPIDURAL, TRANSFORAMINAL APPROACH (Bilateral)    Final Diagnosis: Lumbar radiculopathy [M54.16]  DDD (degenerative disc disease), lumbar [M51.36]    Disposition: Home or self care    Patient Instructions:   Current Discharge Medication List      CONTINUE these medications which have NOT CHANGED    Details   ACCU-CHEK FASTCLIX LANCING DEV Kit USE AS DIRECTED.  Qty: 1 each, Refills: 0    Associated Diagnoses: Controlled diabetes mellitus type 2 with complications      ALCOHOL ANTISEPTIC PADS (ALCOHOL PREP PADS TOP)       atorvastatin (LIPITOR) 20 MG tablet Take 1 tablet (20 mg total) by mouth once daily.  Qty: 90 tablet, Refills: 3    Associated Diagnoses: Combined 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      blood sugar diagnostic (ACCU-CHEK SMARTVIEW TEST STRIP) Strp Use as directed to check blood sugar twice daily.  Qty: 200 strip, Refills: 3    Associated Diagnoses: Controlled type 2 diabetes mellitus with complication, without long-term current use of insulin      blood-glucose meter kit Use as instructed  Qty: 1 each, Refills: 0    Comments: AccuCheck  Associated Diagnoses: Controlled type 2 diabetes mellitus without complication, without long-term current use of insulin      calcium carbonate (OS-MALCOLM) 600 mg calcium (1,500 mg) Tab Take 1 tablet by mouth 2 (two) times daily.       carvedilol (COREG) 25 MG tablet Take 1 tablet (25 mg total) by mouth 2 (two) times daily.  Qty: 180 tablet, Refills: 3    Associated Diagnoses: Essential hypertension      cloNIDine (CATAPRES) 0.3 MG tablet Take 1 tablet (0.3 mg total) by mouth 3 (three) times daily.  Qty: 270 tablet, Refills: 1    Associated Diagnoses: Essential hypertension      diaper,brief,adult,disposable Misc       epoetin  german-epbx (RETACRIT) 10,000 unit/mL imjection Inject 20,000 Units into the skin every 14 (fourteen) days.      fenofibrate 160 MG Tab Take 1 tablet (160 mg total) by mouth once daily.  Qty: 90 tablet, Refills: 1    Associated Diagnoses: Combined 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      FLUZONE HIGH-DOSE 2019-20, PF, 180 mcg/0.5 mL Syrg PHARMACIST ADMINISTERED IMMUNIZATION ADMINISTERED AT TIME OF DISPENSING  Refills: 0      folic acid (FOLVITE) 1 MG tablet Take 1 tablet (1,000 mcg total) by mouth once daily.  Qty: 90 tablet, Refills: 0    Associated Diagnoses: Sarcoidosis; Myopathy      gabapentin (NEURONTIN) 400 MG capsule Take 1 capsule (400 mg total) by mouth 3 (three) times daily.  Qty: 90 capsule, Refills: 1    Associated Diagnoses: Chronic bilateral low back pain with left-sided sciatica      levothyroxine (SYNTHROID) 50 MCG tablet TAKE 1 TABLET BY MOUTH ONCE DAILY BEFORE BREAKFAST  Qty: 90 tablet, Refills: 1    Associated Diagnoses: Hypothyroidism, unspecified type      metFORMIN (GLUCOPHAGE) 1000 MG tablet TAKE 1 TABLET BY MOUTH TWICE DAILY WITH MEALS  Qty: 180 tablet, Refills: 1    Associated Diagnoses: Diabetes mellitus, type 2; Diabetes mellitus with stage 3 chronic kidney disease      methotrexate 2.5 MG Tab TAKE 6 TABLETS BY MOUTH EVERY 7 DAYS  Qty: 24 tablet, Refills: 1    Comments: Please consider 90 day supplies to promote better adherence  Associated Diagnoses: Sarcoidosis; Myopathy      NIFEdipine (PROCARDIA-XL) 30 MG (OSM) 24 hr tablet Take 1 tablet (30 mg total) by mouth once daily.  Qty: 90 tablet, Refills: 1    Associated Diagnoses: Essential hypertension      oxybutynin (DITROPAN-XL) 5 MG TR24 Take 1 tablet (5 mg total) by mouth once daily.  Qty: 30 tablet, Refills: 11      predniSONE (DELTASONE) 5 MG tablet Take 1 tablet (5 mg total) by mouth once daily.  Qty: 90 tablet, Refills: 1    Comments: Hold until medrol taper has been completed - medrol taper begins  10/20/19  Associated Diagnoses: Sarcoidosis; Myopathy      tiZANidine (ZANAFLEX) 2 MG tablet Take 2 tablets (4 mg total) by mouth every 8 (eight) hours as needed.  Qty: 270 tablet, Refills: 0    Associated Diagnoses: Myalgia         STOP taking these medications       HYDROcodone-acetaminophen (NORCO) 5-325 mg per tablet Comments:   Reason for Stopping:                   Discharge Diagnosis: Lumbar radiculopathy [M54.16]  DDD (degenerative disc disease), lumbar [M51.36]  Condition on Discharge: Stable with no complications to procedure   Diet on Discharge: Same as before.  Activity: as per instruction sheet.  Discharge to: Home with a responsible adult.  Follow up: 2-4 weeks       Please call my office or pager at [phone] if experienced any weakness or loss of sensation, fever > 101.5, pain uncontrolled with oral medications, persistent nausea/vomiting/or diarrhea, redness or drainage from the incisions, or any other worrisome concerns. If physician on call was not reached or could not communicate with our office for any reason please go to the nearest emergency department

## 2019-12-05 NOTE — DISCHARGE INSTRUCTIONS

## 2019-12-05 NOTE — H&P
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, without long-term current use of insulin     Depression     Diabetes mellitus, type 2     Eye injury as a child     k-abrasion  od    Hyperlipidemia     Hypertension     Hypothyroidism     Immune deficiency disorder     Immune disorder     Myalgia and myositis 2012    Osteoporosis     Polyneuropathy     Renal manifestation of secondary diabetes mellitus     Sarcoidosis     Ulcer     no cancer    Urinary incontinence      Past Surgical History:   Procedure Laterality Date    CARPAL TUNNEL RELEASE      Rt wrist    CATARACT EXTRACTION W/  INTRAOCULAR LENS IMPLANT Right 2015    Dr. Azevedo    CATARACT EXTRACTION W/  INTRAOCULAR LENS IMPLANT Left 2015    Dr. Azevedo     SECTION      CHOLECYSTECTOMY      INJECTION OF JOINT Left 3/21/2019    Procedure: Injection, Joint  fLUOROSCOPIC jOINT iNJECTION (hIP iNJECTION) LEFT ROCH BURSA AS WELL LEFT TROCHANTERIC BURSA;  Surgeon: Alfonso Richards MD;  Location: [facility] PAIN T;  Service: Pain Management;  Laterality: Left;  NEEDS CONSENT, DIABETIC    INJECTION OF JOINT Left 2019    Procedure: Injection, Joint FLUOROSCOPIC JOINT INJECTION (HIP INJECTION) LEFT HIP;  Surgeon: Alfonso Richards MD;  [facility];  Service: Pain Management;  Laterality: Left;  NEEDS CONSENT    INJECTION OF JOINT Left 2019    Procedure: INJECTION, JOINT;  Surgeon: Alfonso Richards MD;  Location: [facility]  PAIN MGT;  Service: Pain Management;  Laterality: Left;  Left Hip and Left GTB Injections    TUBAL LIGATION       Review of patient's allergies indicates:   Allergen Reactions    Azathioprine Shortness Of Breath and Other (See Comments)     Fatigue      Current Facility-Administered Medications   Medication    0.9%  NaCl infusion    dexamethasone injection    iohexol (OMNIPAQUE 300) injection    lidocaine (PF) 10 mg/ml (1%) injection    lidocaine HCL 10 mg/ml (1%) injection       PMHx, PSHx, Allergies, Medications reviewed in epic    ROS negative except pain complaints in HPI    OBJECTIVE:    BP (!) 184/90   Pulse 93   Temp 98.4 °F (36.9 °C) (Oral)   Resp 18   Ht 5' (1.524 m)   Wt 57.6 kg (127 lb)   LMP  (LMP Unknown)   SpO2 98%   BMI 24.80 kg/m²     PHYSICAL EXAMINATION:    GENERAL: Well appearing, in no acute distress, alert and oriented x3.  PSYCH:  Mood and affect appropriate.  SKIN: Skin color, texture, turgor normal, no rashes or lesions which will impact the procedure.  CV: RRR with palpation of the radial artery.  PULM: No evidence of respiratory difficulty, symmetric chest rise. Clear to auscultation.  NEURO: Cranial nerves grossly intact.    Plan:    Proceed with procedure as planned Procedure(s) (LRB):  INJECTION, STEROID, EPIDURAL, TRANSFORAMINAL APPROACH (Bilateral)    Breezy Gabriel  12/05/2019

## 2019-12-05 NOTE — OP NOTE
Date of Service: 12/05/2019    PCP: Micaela Mendoza MD    Referring Physician:    Time-out taken to identify patient and procedure side prior to starting the procedure.   I attest that I have reviewed the patient's home medications prior to the procedure and no contraindication have been identified. I  re-evaluated the patient after the patient was positioned for the procedure in the procedure room immediately before the procedural time-out. The vital signs are current and represent the current state of the patient which has not significantly changed since the preprocedure assessment.                                                           PROCEDURE: Bilateral L5 transforaminal epidural steroid injection under fluoroscopy    REASON FOR PROCEDURE: Bilateral Lumbar radiculopathy [M54.16]  DDD (degenerative disc disease), lumbar [M51.36]  1. Lumbar radiculopathy    2. Chronic pain      POSTPROCEDURE DIAGNOSIS:   Lumbar radiculopathy [M54.16]  DDD (degenerative disc disease), lumbar [M51.36]    1. Lumbar radiculopathy    2. Chronic pain           PHYSICIAN: Alfonso Richards MD  ASSISTANTS:Breezy Gabriel MD [employer] pain fellow       MEDICATIONS INJECTED:  Preservative-free dexamethasone 10mg, Xylocaine 1% MPF 3-5ml. 3ml per level. Preservative free, sterile normal saline is used to get larger volume as needed.  LOCAL ANESTHETIC INJECTED:  Xylocaine 1% 9ml with Sodium Bicarbonate 1ml. 3ml per site.    SEDATION MEDICATIONS: None    ESTIMATED BLOOD LOSS:  None.    COMPLICATIONS:  None.    TECHNIQUE:   Laying in a prone position, the patient was prepped and draped in the usual sterile fashion using ChloraPrep and fenestrated drape.  The area to be injected was determined under fluoroscopic guidance.  Local anesthetic was given by raising a wheel and going down to the hub of a 27-gauge 1.25 inch needle.  The 3.5inch 22-gauge spinal needle was introduced towards the transverse process of all levels as stated above.   The needle was  walked medially then hinged into the neural foramen.  Omnipaque was injected to confirm appropriate placement and that there was no vascular runoff.  The medication was then injected after applying negative pressure. The patient tolerated the procedure well.    PAIN BEFORE THE PROCEDURE: 5/10.    PAIN AFTER THE PROCEDURE: 0/10.    The patient was monitored after the procedure.  Patient was given post procedure and discharge instructions to follow at home.  We will see the patient back in two weeks or the patient may call to inform of status. The patient was discharged in a stable condition.

## 2019-12-06 ENCOUNTER — LAB VISIT (OUTPATIENT)
Dept: LAB | Facility: HOSPITAL | Age: 74
End: 2019-12-06
Attending: INTERNAL MEDICINE
Payer: MEDICARE

## 2019-12-06 ENCOUNTER — INFUSION (OUTPATIENT)
Dept: INFUSION THERAPY | Facility: HOSPITAL | Age: 74
End: 2019-12-06
Attending: INTERNAL MEDICINE
Payer: MEDICARE

## 2019-12-06 VITALS
SYSTOLIC BLOOD PRESSURE: 152 MMHG | RESPIRATION RATE: 17 BRPM | HEART RATE: 86 BPM | OXYGEN SATURATION: 98 % | DIASTOLIC BLOOD PRESSURE: 75 MMHG | TEMPERATURE: 98 F

## 2019-12-06 DIAGNOSIS — N18.9 ANEMIA IN CHRONIC KIDNEY DISEASE, UNSPECIFIED CKD STAGE: ICD-10-CM

## 2019-12-06 DIAGNOSIS — N18.9 ANEMIA IN CHRONIC KIDNEY DISEASE, UNSPECIFIED CKD STAGE: Primary | ICD-10-CM

## 2019-12-06 DIAGNOSIS — Z53.1 REFUSAL OF BLOOD TRANSFUSIONS AS PATIENT IS JEHOVAH'S WITNESS: ICD-10-CM

## 2019-12-06 DIAGNOSIS — D63.1 ANEMIA IN CHRONIC KIDNEY DISEASE, UNSPECIFIED CKD STAGE: ICD-10-CM

## 2019-12-06 DIAGNOSIS — D50.9 IRON DEFICIENCY ANEMIA, UNSPECIFIED IRON DEFICIENCY ANEMIA TYPE: ICD-10-CM

## 2019-12-06 DIAGNOSIS — D63.1 ANEMIA IN CHRONIC KIDNEY DISEASE, UNSPECIFIED CKD STAGE: Primary | ICD-10-CM

## 2019-12-06 LAB
BASOPHILS # BLD AUTO: 0.01 K/UL (ref 0–0.2)
BASOPHILS NFR BLD: 0.1 % (ref 0–1.9)
DIFFERENTIAL METHOD: ABNORMAL
EOSINOPHIL # BLD AUTO: 0 K/UL (ref 0–0.5)
EOSINOPHIL NFR BLD: 0.1 % (ref 0–8)
ERYTHROCYTE [DISTWIDTH] IN BLOOD BY AUTOMATED COUNT: 13.6 % (ref 11.5–14.5)
HCT VFR BLD AUTO: 30.7 % (ref 37–48.5)
HGB BLD-MCNC: 9.7 G/DL (ref 12–16)
IMM GRANULOCYTES # BLD AUTO: 0.06 K/UL (ref 0–0.04)
IMM GRANULOCYTES NFR BLD AUTO: 0.6 % (ref 0–0.5)
LYMPHOCYTES # BLD AUTO: 0.8 K/UL (ref 1–4.8)
LYMPHOCYTES NFR BLD: 7.4 % (ref 18–48)
MCH RBC QN AUTO: 33.7 PG (ref 27–31)
MCHC RBC AUTO-ENTMCNC: 31.6 G/DL (ref 32–36)
MCV RBC AUTO: 107 FL (ref 82–98)
MONOCYTES # BLD AUTO: 0.3 K/UL (ref 0.3–1)
MONOCYTES NFR BLD: 3.1 % (ref 4–15)
NEUTROPHILS # BLD AUTO: 9 K/UL (ref 1.8–7.7)
NEUTROPHILS NFR BLD: 88.7 % (ref 38–73)
NRBC BLD-RTO: 0 /100 WBC
PLATELET # BLD AUTO: 304 K/UL (ref 150–350)
PMV BLD AUTO: 9.3 FL (ref 9.2–12.9)
RBC # BLD AUTO: 2.88 M/UL (ref 4–5.4)
WBC # BLD AUTO: 10.16 K/UL (ref 3.9–12.7)

## 2019-12-06 PROCEDURE — 85025 COMPLETE CBC W/AUTO DIFF WBC: CPT | Mod: HCNC

## 2019-12-06 PROCEDURE — 36415 COLL VENOUS BLD VENIPUNCTURE: CPT | Mod: HCNC

## 2019-12-06 PROCEDURE — 96372 THER/PROPH/DIAG INJ SC/IM: CPT | Mod: HCNC

## 2019-12-06 PROCEDURE — 63600175 PHARM REV CODE 636 W HCPCS: Mod: HCNC | Performed by: INTERNAL MEDICINE

## 2019-12-06 RX ADMIN — EPOETIN ALFA-EPBX 20000 UNITS: 10000 INJECTION, SOLUTION INTRAVENOUS; SUBCUTANEOUS at 12:12

## 2019-12-06 NOTE — PLAN OF CARE
Patient arrived to unit for Retacrit injection. Plan of care reviewed, patient agreeable to plan. Nuzhat stated she has been having allergy/cold symptoms recently, denies fever. Patient tolerated injection well. VSS. Discharge instructions reviewed, patient instructed to return 12/20/19. Patient exited unit via scooter accompanied by family. Patient in NAD at time of discharge.

## 2019-12-09 ENCOUNTER — PATIENT MESSAGE (OUTPATIENT)
Dept: FAMILY MEDICINE | Facility: CLINIC | Age: 74
End: 2019-12-09

## 2019-12-09 DIAGNOSIS — M51.36 DEGENERATIVE DISC DISEASE, LUMBAR: ICD-10-CM

## 2019-12-09 RX ORDER — HYDROCODONE BITARTRATE AND ACETAMINOPHEN 5; 325 MG/1; MG/1
1 TABLET ORAL EVERY 6 HOURS PRN
Qty: 90 TABLET | Refills: 0 | Status: SHIPPED | OUTPATIENT
Start: 2019-12-09 | End: 2020-01-09 | Stop reason: SDUPTHER

## 2019-12-16 ENCOUNTER — PATIENT MESSAGE (OUTPATIENT)
Dept: RHEUMATOLOGY | Facility: CLINIC | Age: 74
End: 2019-12-16

## 2019-12-16 DIAGNOSIS — G72.9 MYOPATHY: ICD-10-CM

## 2019-12-16 DIAGNOSIS — D86.9 SARCOIDOSIS: Chronic | ICD-10-CM

## 2019-12-16 DIAGNOSIS — I10 ESSENTIAL HYPERTENSION: Chronic | ICD-10-CM

## 2019-12-16 RX ORDER — FOLIC ACID 1 MG/1
1000 TABLET ORAL DAILY
Qty: 90 TABLET | Refills: 0 | Status: SHIPPED | OUTPATIENT
Start: 2019-12-16 | End: 2020-03-23

## 2019-12-16 RX ORDER — METHOTREXATE 2.5 MG/1
TABLET ORAL
Qty: 24 TABLET | Refills: 1 | Status: SHIPPED | OUTPATIENT
Start: 2019-12-16 | End: 2020-01-31

## 2019-12-17 ENCOUNTER — OFFICE VISIT (OUTPATIENT)
Dept: RHEUMATOLOGY | Facility: CLINIC | Age: 74
End: 2019-12-17
Payer: MEDICARE

## 2019-12-17 VITALS
BODY MASS INDEX: 24.97 KG/M2 | HEIGHT: 61 IN | DIASTOLIC BLOOD PRESSURE: 75 MMHG | SYSTOLIC BLOOD PRESSURE: 135 MMHG | HEART RATE: 89 BPM | WEIGHT: 132.25 LBS

## 2019-12-17 DIAGNOSIS — D86.9 SARCOIDOSIS: Primary | ICD-10-CM

## 2019-12-17 DIAGNOSIS — G72.9 MYOPATHY: ICD-10-CM

## 2019-12-17 DIAGNOSIS — D84.9 IMMUNOSUPPRESSION: ICD-10-CM

## 2019-12-17 PROCEDURE — 1159F MED LIST DOCD IN RCRD: CPT | Mod: HCNC,S$GLB,, | Performed by: INTERNAL MEDICINE

## 2019-12-17 PROCEDURE — 3078F DIAST BP <80 MM HG: CPT | Mod: HCNC,CPTII,S$GLB, | Performed by: INTERNAL MEDICINE

## 2019-12-17 PROCEDURE — 3078F PR MOST RECENT DIASTOLIC BLOOD PRESSURE < 80 MM HG: ICD-10-PCS | Mod: HCNC,CPTII,S$GLB, | Performed by: INTERNAL MEDICINE

## 2019-12-17 PROCEDURE — 96372 PR INJECTION,THERAP/PROPH/DIAG2ST, IM OR SUBCUT: ICD-10-PCS | Mod: HCNC,S$GLB,, | Performed by: INTERNAL MEDICINE

## 2019-12-17 PROCEDURE — 99999 PR PBB SHADOW E&M-EST. PATIENT-LVL III: ICD-10-PCS | Mod: PBBFAC,HCNC,, | Performed by: INTERNAL MEDICINE

## 2019-12-17 PROCEDURE — 99499 RISK ADDL DX/OHS AUDIT: ICD-10-PCS | Mod: HCNC,S$GLB,, | Performed by: INTERNAL MEDICINE

## 2019-12-17 PROCEDURE — 99499 UNLISTED E&M SERVICE: CPT | Mod: HCNC,S$GLB,, | Performed by: INTERNAL MEDICINE

## 2019-12-17 PROCEDURE — 1101F PT FALLS ASSESS-DOCD LE1/YR: CPT | Mod: HCNC,CPTII,S$GLB, | Performed by: INTERNAL MEDICINE

## 2019-12-17 PROCEDURE — 1125F PR PAIN SEVERITY QUANTIFIED, PAIN PRESENT: ICD-10-PCS | Mod: HCNC,S$GLB,, | Performed by: INTERNAL MEDICINE

## 2019-12-17 PROCEDURE — 1125F AMNT PAIN NOTED PAIN PRSNT: CPT | Mod: HCNC,S$GLB,, | Performed by: INTERNAL MEDICINE

## 2019-12-17 PROCEDURE — 1101F PR PT FALLS ASSESS DOC 0-1 FALLS W/OUT INJ PAST YR: ICD-10-PCS | Mod: HCNC,CPTII,S$GLB, | Performed by: INTERNAL MEDICINE

## 2019-12-17 PROCEDURE — 96372 THER/PROPH/DIAG INJ SC/IM: CPT | Mod: HCNC,S$GLB,, | Performed by: INTERNAL MEDICINE

## 2019-12-17 PROCEDURE — 99214 PR OFFICE/OUTPT VISIT, EST, LEVL IV, 30-39 MIN: ICD-10-PCS | Mod: HCNC,25,S$GLB, | Performed by: INTERNAL MEDICINE

## 2019-12-17 PROCEDURE — 3075F SYST BP GE 130 - 139MM HG: CPT | Mod: HCNC,CPTII,S$GLB, | Performed by: INTERNAL MEDICINE

## 2019-12-17 PROCEDURE — 99999 PR PBB SHADOW E&M-EST. PATIENT-LVL III: CPT | Mod: PBBFAC,HCNC,, | Performed by: INTERNAL MEDICINE

## 2019-12-17 PROCEDURE — 1159F PR MEDICATION LIST DOCUMENTED IN MEDICAL RECORD: ICD-10-PCS | Mod: HCNC,S$GLB,, | Performed by: INTERNAL MEDICINE

## 2019-12-17 PROCEDURE — 99214 OFFICE O/P EST MOD 30 MIN: CPT | Mod: HCNC,25,S$GLB, | Performed by: INTERNAL MEDICINE

## 2019-12-17 PROCEDURE — 3075F PR MOST RECENT SYSTOLIC BLOOD PRESS GE 130-139MM HG: ICD-10-PCS | Mod: HCNC,CPTII,S$GLB, | Performed by: INTERNAL MEDICINE

## 2019-12-17 RX ORDER — TRIAMCINOLONE ACETONIDE 40 MG/ML
80 INJECTION, SUSPENSION INTRA-ARTICULAR; INTRAMUSCULAR
Status: COMPLETED | OUTPATIENT
Start: 2019-12-17 | End: 2019-12-17

## 2019-12-17 RX ORDER — CARVEDILOL 25 MG/1
25 TABLET ORAL 2 TIMES DAILY
Qty: 180 TABLET | Refills: 3 | Status: SHIPPED | OUTPATIENT
Start: 2019-12-17 | End: 2020-06-08 | Stop reason: SDUPTHER

## 2019-12-17 RX ADMIN — TRIAMCINOLONE ACETONIDE 80 MG: 40 INJECTION, SUSPENSION INTRA-ARTICULAR; INTRAMUSCULAR at 11:12

## 2019-12-17 ASSESSMENT — ROUTINE ASSESSMENT OF PATIENT INDEX DATA (RAPID3)
PAIN SCORE: 10
AM STIFFNESS SCORE: 1, YES
MDHAQ FUNCTION SCORE: 1
PSYCHOLOGICAL DISTRESS SCORE: 3.3
PATIENT GLOBAL ASSESSMENT SCORE: 10
TOTAL RAPID3 SCORE: 7.78
FATIGUE SCORE: 10

## 2019-12-17 NOTE — PROGRESS NOTES
Subjective:       Patient ID: Regino Lawrence is a 74 y.o. female.    Chief Complaint: Sarcoidosis    HPI:  Regino Lawrence is a 74 y.o. female with history of sarcoidosis with associated myopathy and   arthropathy. Sarcoidosis that was first manifested by muscle inflammation, low white   blood cell count, hair loss, skin involvement. She was treated in the   past with methotrexate and Plaquenil, both of which were ineffective.   Cellcept and Imuran caused some unknown side effect (she thinks it made her sick).   Colchicine was held due to low WBC.   Although methotrexate did not help in past it was retried and she felt it helped hair growth but did not help body aches.   She held MTX due to an URI but patient has not wanted restarted since then (2013).   Leflunomide was held due to elevated BP after less than a week of use.    Interval History:     Was seen in hospital for left face pain and numbness.  Felt to be from left eye injection since work up negative.  Was discharged on Prednisone 10 mg daily for a week.  She had low grade temp of 99.3-99.8.  Cough with post nasal drip.  Mucus clear.   Right hip pain severe.  Dr. Richards gave injection on both sides of spine but did not help.  Left foot is swollen.      She has scooter and it helps.   Hands continue to swell periodically.    Pain 10/10 ache in hips, left leg, hands and left foot.    Pain medication helps her sleep but pain will wake her up.  Activity worsens.        Currently on prednisone 5 mg.  Currently on MTX 6 tabs.      Review of Systems   Constitutional: Positive for fatigue.   HENT:        Dry mouth   Eyes:        Dry eyes   Respiratory: Negative.  Negative for shortness of breath.    Cardiovascular: Negative.    Gastrointestinal: Negative.    Endocrine: Negative.    Genitourinary: Negative.    Musculoskeletal: Positive for arthralgias, back pain, joint swelling and myalgias.   Skin: Negative.    Allergic/Immunologic: Negative.    Neurological:  "Negative.    Hematological: Negative.    Psychiatric/Behavioral: Negative.          Objective:   /75 (BP Location: Right arm, Patient Position: Sitting, BP Method: Medium (Automatic))   Pulse 89   Ht 5' 1" (1.549 m)   Wt 60 kg (132 lb 4.4 oz)   LMP  (LMP Unknown)   BMI 24.99 kg/m²      Physical Exam   Constitutional: She is oriented to person, place, and time and well-developed, well-nourished, and in no distress.   HENT:   Head: Normocephalic and atraumatic.   Eyes: Conjunctivae and EOM are normal.   Cardiovascular: Normal rate, regular rhythm and normal heart sounds.    Pulmonary/Chest: Effort normal and breath sounds normal.   Abdominal: Soft. Bowel sounds are normal.   Neurological: She is alert and oriented to person, place, and time.   Slow careful gait.  Walker in room   Skin: Skin is warm and dry.     Psychiatric: Mood and affect normal.   Musculoskeletal:   4.5/5 UE and LE proximal strength bilaterally  28 joint count: 20 tender (MCPs and PIPs) and 10 swollen (all MCPs).  Swollen and tender MCPs  (Unchanged)  Pain on compression of left MTPs with swelling on dorsum of left foot           LABS    Component      Latest Ref Rng & Units 12/6/2019 12/5/2019 10/19/2019   WBC      3.90 - 12.70 K/uL 10.16  4.58   RBC      4.00 - 5.40 M/uL 2.88 (L)  3.20 (L)   Hemoglobin      12.0 - 16.0 g/dL 9.7 (L)  11.1 (L)   Hematocrit      37.0 - 48.5 % 30.7 (L)  34.7 (L)   MCV      82 - 98 fL 107 (H)  108 (H)   MCH      27.0 - 31.0 pg 33.7 (H)  34.7 (H)   MCHC      32.0 - 36.0 g/dL 31.6 (L)  32.0   RDW      11.5 - 14.5 % 13.6  14.2   Platelets      150 - 350 K/uL 304  371 (H)   MPV      9.2 - 12.9 fL 9.3  8.9 (L)   Immature Granulocytes      0.0 - 0.5 % 0.6 (H)  1.5 (H)   Gran # (ANC)      1.8 - 7.7 K/uL 9.0 (H)  2.8   Immature Grans (Abs)      0.00 - 0.04 K/uL 0.06 (H)  0.07 (H)   Lymph #      1.0 - 4.8 K/uL 0.8 (L)  1.2   Mono #      0.3 - 1.0 K/uL 0.3  0.4   Eos #      0.0 - 0.5 K/uL 0.0  0.1   Baso #      0.00 - " 0.20 K/uL 0.01  0.02   nRBC      0 /100 WBC 0  0   Gran%      38.0 - 73.0 % 88.7 (H)  61.9   Lymph%      18.0 - 48.0 % 7.4 (L)  27.1   Mono%      4.0 - 15.0 % 3.1 (L)  7.6   Eosinophil%      0.0 - 8.0 % 0.1  1.5   Basophil%      0.0 - 1.9 % 0.1  0.4   Differential Method       Automated  Automated   Sodium      136 - 145 mmol/L   139   Potassium      3.5 - 5.1 mmol/L   3.4 (L)   Chloride      95 - 110 mmol/L   103   CO2      23 - 29 mmol/L   25   Glucose      70 - 110 mg/dL   123 (H)   BUN, Bld      8 - 23 mg/dL   14   Creatinine      0.5 - 1.4 mg/dL   0.9   Calcium      8.7 - 10.5 mg/dL   9.7   PROTEIN TOTAL      6.0 - 8.4 g/dL   7.1   Albumin      3.5 - 5.2 g/dL   4.1   BILIRUBIN TOTAL      0.1 - 1.0 mg/dL   0.5   Alkaline Phosphatase      55 - 135 U/L   59   AST      10 - 40 U/L   15   ALT      10 - 44 U/L   16   Anion Gap      8 - 16 mmol/L   11   eGFR if African American      >60 mL/min/1.73 m:2   >60   eGFR if non African American      >60 mL/min/1.73 m:2   >60   Specimen UA         Urine, Clean Catch   Color, UA      Yellow, Straw, Leeanne   Straw   Appearance, UA      Clear   Clear   pH, UA      5.0 - 8.0   8.0   Specific Gravity, UA      1.005 - 1.030   1.010   Protein, UA      Negative   Negative   Glucose, UA      Negative   Negative   Ketones, UA      Negative   Negative   Bilirubin (UA)      Negative   Negative   Occult Blood UA      Negative   Negative   NITRITE UA      Negative   Negative   UROBILINOGEN UA      <2.0 EU/dL   Negative   Leukocytes, UA      Negative   Negative   CPK      20 - 180 U/L   100   CPK MB      0.1 - 6.5 ng/mL   1.2   MB%      0.0 - 5.0 %   1.2   Protime      9.0 - 12.5 sec   10.2   Coumadin Monitoring INR      0.8 - 1.2   1.0   CRP      0.0 - 8.2 mg/L   8.4 (H)   Sed Rate      0 - 20 mm/Hr   22 (H)   Aldolase      1.2 - 7.6 U/L      TSH      0.400 - 4.000 uIU/mL      Magnesium      1.6 - 2.6 mg/dL   1.5 (L)   Phosphorus      2.7 - 4.5 mg/dL   3.3   Troponin I      0.000 - 0.026  ng/mL   <0.006   aPTT      21.0 - 32.0 sec   27.0   POCT Glucose      70 - 110 mg/dL  119 (H)      Component      Latest Ref Rng & Units 10/18/2019 9/6/2019 8/29/2019   WBC      3.90 - 12.70 K/uL  7.15    RBC      4.00 - 5.40 M/uL  2.96 (L)    Hemoglobin      12.0 - 16.0 g/dL  10.0 (L)    Hematocrit      37.0 - 48.5 %  31.2 (L)    MCV      82 - 98 fL  105 (H)    MCH      27.0 - 31.0 pg  33.8 (H)    MCHC      32.0 - 36.0 g/dL  32.1    RDW      11.5 - 14.5 %  13.1    Platelets      150 - 350 K/uL  301    MPV      9.2 - 12.9 fL  8.6 (L)    Immature Granulocytes      0.0 - 0.5 %      Gran # (ANC)      1.8 - 7.7 K/uL  5.3    Immature Grans (Abs)      0.00 - 0.04 K/uL      Lymph #      1.0 - 4.8 K/uL  1.3    Mono #      0.3 - 1.0 K/uL  0.5    Eos #      0.0 - 0.5 K/uL  0.0    Baso #      0.00 - 0.20 K/uL  0.00    nRBC      0 /100 WBC      Gran%      38.0 - 73.0 %  75.0 (H)    Lymph%      18.0 - 48.0 %  18.0    Mono%      4.0 - 15.0 %  6.4    Eosinophil%      0.0 - 8.0 %  0.6    Basophil%      0.0 - 1.9 %  0.0    Differential Method        Automated    Sodium      136 - 145 mmol/L   139   Potassium      3.5 - 5.1 mmol/L   3.8   Chloride      95 - 110 mmol/L   104   CO2      23 - 29 mmol/L   27   Glucose      70 - 110 mg/dL   138 (H)   BUN, Bld      8 - 23 mg/dL   20   Creatinine      0.5 - 1.4 mg/dL   1.2   Calcium      8.7 - 10.5 mg/dL   9.3   PROTEIN TOTAL      6.0 - 8.4 g/dL   6.8   Albumin      3.5 - 5.2 g/dL   4.0   BILIRUBIN TOTAL      0.1 - 1.0 mg/dL   0.5   Alkaline Phosphatase      55 - 135 U/L   63   AST      10 - 40 U/L   17   ALT      10 - 44 U/L   13   Anion Gap      8 - 16 mmol/L   8   eGFR if African American      >60 mL/min/1.73 m:2   51.8 (A)   eGFR if non African American      >60 mL/min/1.73 m:2   44.9 (A)   Cholesterol      120 - 199 mg/dL 136     Triglycerides      30 - 150 mg/dL 174 (H)     HDL      40 - 75 mg/dL 54     LDL Cholesterol External      63.0 - 159.0 mg/dL 47.2 (L)     Hdl/Cholesterol  Ratio      20.0 - 50.0 % 39.7     Total Cholesterol/HDL Ratio      2.0 - 5.0 2.5     Non-HDL Cholesterol      mg/dL 82     CPK      20 - 180 U/L   189 (H)   CRP      0.0 - 8.2 mg/L   3.8   Sed Rate      0 - 20 mm/Hr   3   Aldolase      1.2 - 7.6 U/L   3.9   TSH      0.400 - 4.000 uIU/mL 1.328            Assessment:       1.   Sarcoidosis. Manifested by myopathy and arthropathy. Persistent joint pain and myalgias despite prednisone 10 mg.  Now with diffuse body pain and joint swelling    2.   Myalgia and myositis.  History of fibromyalgia   3.   Osteopenia. Took Fosamax for 5 years stopped 6/2013.  Awaiting patient decision on Prolia after she sees dentis.    4.   Fatigue     5.   Diabetes mellitus type 2 in nonobese.  Last      6.           Neck pain. X-ray with degenerative changes. S/p laminectomy-cervical fusion C3-C7 11/16/2015 for cervical spinal stenosis.    7.           Back pain    8.           HTN.  9.           SOB.  When blood count low  10.         Anemia.  On Procrit    Plan:       1. Labs   2. Kenalog 80 mg IM.  Continue prednisone 5 mg daily daily.  Increase to 7 tabs  MTX if labs allow.  Continue folic acid.   3. [employer] stopped tramadol.  Still on hydrocodone/acetaminophen from primary doctor.  [employer] now stopped Flexeril.  Will switch to tizanidine.  Risk of sedation discussed.  Patient to try 2 mg first and contact office.    4. Following with nephrology  5. DEXA with osteopenia of hip total and femoral neck. FRAX does not suggest treatment however if prednisone goes to 7.5 mg or more will consider Prolia (due renal insufficiency).  Information provided for patient to review.  She read information but did not see dentist to have teeth pulled.  6.  Follow with Dr. Conner regarding spine issues.  7. Had flu vaccination                 RTO in 3-4 months.    Patient seen face to face for 25 minutes and greater than 50% spent in counseling regarding Joint pains,   management of sarcoidosis and  pain.

## 2019-12-18 ENCOUNTER — PATIENT MESSAGE (OUTPATIENT)
Dept: RHEUMATOLOGY | Facility: CLINIC | Age: 74
End: 2019-12-18

## 2019-12-20 ENCOUNTER — INFUSION (OUTPATIENT)
Dept: INFUSION THERAPY | Facility: HOSPITAL | Age: 74
End: 2019-12-20
Attending: INTERNAL MEDICINE
Payer: MEDICARE

## 2019-12-20 VITALS
RESPIRATION RATE: 18 BRPM | TEMPERATURE: 98 F | DIASTOLIC BLOOD PRESSURE: 74 MMHG | HEART RATE: 91 BPM | SYSTOLIC BLOOD PRESSURE: 149 MMHG | OXYGEN SATURATION: 99 %

## 2019-12-20 DIAGNOSIS — N18.9 ANEMIA IN CHRONIC KIDNEY DISEASE, UNSPECIFIED CKD STAGE: Primary | ICD-10-CM

## 2019-12-20 DIAGNOSIS — D50.9 IRON DEFICIENCY ANEMIA, UNSPECIFIED IRON DEFICIENCY ANEMIA TYPE: ICD-10-CM

## 2019-12-20 DIAGNOSIS — D63.1 ANEMIA IN CHRONIC KIDNEY DISEASE, UNSPECIFIED CKD STAGE: Primary | ICD-10-CM

## 2019-12-20 DIAGNOSIS — Z53.1 REFUSAL OF BLOOD TRANSFUSIONS AS PATIENT IS JEHOVAH'S WITNESS: ICD-10-CM

## 2019-12-20 PROCEDURE — 96372 THER/PROPH/DIAG INJ SC/IM: CPT | Mod: HCNC

## 2019-12-20 PROCEDURE — 63600175 PHARM REV CODE 636 W HCPCS: Mod: HCNC | Performed by: INTERNAL MEDICINE

## 2019-12-20 RX ADMIN — EPOETIN ALFA-EPBX 20000 UNITS: 10000 INJECTION, SOLUTION INTRAVENOUS; SUBCUTANEOUS at 11:12

## 2019-12-20 NOTE — PLAN OF CARE
12/17 Hgb 10.1 Patient received Retacrit 20,000 units. Tolerated injection well. VSS. Reports increased fatigue and generalized body aches. She received discharge instructions and follow up appointments. She will return in 2 weeks for routine labs and retacrit pending results. Patient verbalized understanding and discharged from unit via mobile scooter accompanied by daughter. Patient in NAD at time of discharge.

## 2019-12-24 ENCOUNTER — PES CALL (OUTPATIENT)
Dept: ADMINISTRATIVE | Facility: CLINIC | Age: 74
End: 2019-12-24

## 2019-12-26 DIAGNOSIS — N18.9 CHRONIC KIDNEY DISEASE, UNSPECIFIED CKD STAGE: Primary | ICD-10-CM

## 2019-12-27 ENCOUNTER — PATIENT MESSAGE (OUTPATIENT)
Dept: FAMILY MEDICINE | Facility: CLINIC | Age: 74
End: 2019-12-27

## 2020-01-03 ENCOUNTER — INFUSION (OUTPATIENT)
Dept: INFUSION THERAPY | Facility: HOSPITAL | Age: 75
End: 2020-01-03
Attending: INTERNAL MEDICINE
Payer: MEDICARE

## 2020-01-03 VITALS
RESPIRATION RATE: 17 BRPM | SYSTOLIC BLOOD PRESSURE: 162 MMHG | OXYGEN SATURATION: 97 % | HEART RATE: 84 BPM | TEMPERATURE: 98 F | DIASTOLIC BLOOD PRESSURE: 77 MMHG

## 2020-01-03 DIAGNOSIS — D63.1 ANEMIA IN CHRONIC KIDNEY DISEASE, UNSPECIFIED CKD STAGE: Primary | ICD-10-CM

## 2020-01-03 DIAGNOSIS — Z53.1 REFUSAL OF BLOOD TRANSFUSIONS AS PATIENT IS JEHOVAH'S WITNESS: ICD-10-CM

## 2020-01-03 DIAGNOSIS — D50.9 IRON DEFICIENCY ANEMIA, UNSPECIFIED IRON DEFICIENCY ANEMIA TYPE: ICD-10-CM

## 2020-01-03 DIAGNOSIS — N18.9 ANEMIA IN CHRONIC KIDNEY DISEASE, UNSPECIFIED CKD STAGE: Primary | ICD-10-CM

## 2020-01-03 PROCEDURE — 96372 THER/PROPH/DIAG INJ SC/IM: CPT | Mod: HCNC

## 2020-01-03 PROCEDURE — 63600175 PHARM REV CODE 636 W HCPCS: Mod: HCNC | Performed by: INTERNAL MEDICINE

## 2020-01-03 RX ADMIN — EPOETIN ALFA-EPBX 20000 UNITS: 10000 INJECTION, SOLUTION INTRAVENOUS; SUBCUTANEOUS at 12:01

## 2020-01-03 NOTE — PLAN OF CARE
Pt arrived to unit via scooter. VSS. Tolerated Retacrit. No complaints voiced. Pt has next appt. Pt discharged via scooter. No distress noted.

## 2020-01-09 ENCOUNTER — OFFICE VISIT (OUTPATIENT)
Dept: FAMILY MEDICINE | Facility: CLINIC | Age: 75
End: 2020-01-09
Payer: MEDICARE

## 2020-01-09 ENCOUNTER — HOSPITAL ENCOUNTER (OUTPATIENT)
Dept: RADIOLOGY | Facility: HOSPITAL | Age: 75
Discharge: HOME | End: 2020-01-09
Attending: FAMILY MEDICINE
Payer: MEDICARE

## 2020-01-09 VITALS
OXYGEN SATURATION: 97 % | SYSTOLIC BLOOD PRESSURE: 132 MMHG | TEMPERATURE: 98 F | HEART RATE: 86 BPM | HEIGHT: 61 IN | DIASTOLIC BLOOD PRESSURE: 68 MMHG | BODY MASS INDEX: 22.7 KG/M2 | WEIGHT: 120.25 LBS

## 2020-01-09 DIAGNOSIS — E03.9 HYPOTHYROIDISM, UNSPECIFIED TYPE: ICD-10-CM

## 2020-01-09 DIAGNOSIS — Z91.81 STATUS POST FALL: Primary | ICD-10-CM

## 2020-01-09 DIAGNOSIS — M51.36 DEGENERATIVE DISC DISEASE, LUMBAR: ICD-10-CM

## 2020-01-09 DIAGNOSIS — M25.552 LEFT HIP PAIN: ICD-10-CM

## 2020-01-09 DIAGNOSIS — M25.562 ACUTE PAIN OF LEFT KNEE: ICD-10-CM

## 2020-01-09 PROCEDURE — 99999 PR PBB SHADOW E&M-EST. PATIENT-LVL III: CPT | Mod: PBBFAC,HCNC,, | Performed by: FAMILY MEDICINE

## 2020-01-09 PROCEDURE — 99215 PR OFFICE/OUTPT VISIT, EST, LEVL V, 40-54 MIN: ICD-10-PCS | Mod: HCNC,S$GLB,, | Performed by: FAMILY MEDICINE

## 2020-01-09 PROCEDURE — 73502 X-RAY EXAM HIP UNI 2-3 VIEWS: CPT | Mod: 26,HCNC,LT, | Performed by: RADIOLOGY

## 2020-01-09 PROCEDURE — 1125F AMNT PAIN NOTED PAIN PRSNT: CPT | Mod: HCNC,S$GLB,, | Performed by: FAMILY MEDICINE

## 2020-01-09 PROCEDURE — 3075F PR MOST RECENT SYSTOLIC BLOOD PRESS GE 130-139MM HG: ICD-10-PCS | Mod: HCNC,CPTII,S$GLB, | Performed by: FAMILY MEDICINE

## 2020-01-09 PROCEDURE — 1125F PR PAIN SEVERITY QUANTIFIED, PAIN PRESENT: ICD-10-PCS | Mod: HCNC,S$GLB,, | Performed by: FAMILY MEDICINE

## 2020-01-09 PROCEDURE — 1159F MED LIST DOCD IN RCRD: CPT | Mod: HCNC,S$GLB,, | Performed by: FAMILY MEDICINE

## 2020-01-09 PROCEDURE — 99215 OFFICE O/P EST HI 40 MIN: CPT | Mod: HCNC,S$GLB,, | Performed by: FAMILY MEDICINE

## 2020-01-09 PROCEDURE — 1101F PT FALLS ASSESS-DOCD LE1/YR: CPT | Mod: HCNC,CPTII,S$GLB, | Performed by: FAMILY MEDICINE

## 2020-01-09 PROCEDURE — 73502 XR HIP 2 VIEW LEFT: ICD-10-PCS | Mod: 26,HCNC,LT, | Performed by: RADIOLOGY

## 2020-01-09 PROCEDURE — 99999 PR PBB SHADOW E&M-EST. PATIENT-LVL III: ICD-10-PCS | Mod: PBBFAC,HCNC,, | Performed by: FAMILY MEDICINE

## 2020-01-09 PROCEDURE — 3078F PR MOST RECENT DIASTOLIC BLOOD PRESSURE < 80 MM HG: ICD-10-PCS | Mod: HCNC,CPTII,S$GLB, | Performed by: FAMILY MEDICINE

## 2020-01-09 PROCEDURE — 3075F SYST BP GE 130 - 139MM HG: CPT | Mod: HCNC,CPTII,S$GLB, | Performed by: FAMILY MEDICINE

## 2020-01-09 PROCEDURE — 1101F PR PT FALLS ASSESS DOC 0-1 FALLS W/OUT INJ PAST YR: ICD-10-PCS | Mod: HCNC,CPTII,S$GLB, | Performed by: FAMILY MEDICINE

## 2020-01-09 PROCEDURE — 73502 X-RAY EXAM HIP UNI 2-3 VIEWS: CPT | Mod: TC,HCNC,FY,PO,LT

## 2020-01-09 PROCEDURE — 1159F PR MEDICATION LIST DOCUMENTED IN MEDICAL RECORD: ICD-10-PCS | Mod: HCNC,S$GLB,, | Performed by: FAMILY MEDICINE

## 2020-01-09 PROCEDURE — 3078F DIAST BP <80 MM HG: CPT | Mod: HCNC,CPTII,S$GLB, | Performed by: FAMILY MEDICINE

## 2020-01-09 RX ORDER — HYDROCODONE BITARTRATE AND ACETAMINOPHEN 5; 325 MG/1; MG/1
1 TABLET ORAL EVERY 6 HOURS PRN
Qty: 28 TABLET | Refills: 0 | Status: SHIPPED | OUTPATIENT
Start: 2020-01-09 | End: 2020-01-16

## 2020-01-09 RX ORDER — METHOTREXATE 2.5 MG/1
TABLET ORAL
Qty: 24 TABLET | Refills: 1 | Status: CANCELLED | OUTPATIENT
Start: 2020-01-09

## 2020-01-09 RX ORDER — LEVOTHYROXINE SODIUM 50 UG/1
TABLET ORAL
Qty: 90 TABLET | Refills: 0 | Status: SHIPPED | OUTPATIENT
Start: 2020-01-09 | End: 2020-04-07 | Stop reason: SDUPTHER

## 2020-01-09 NOTE — PROGRESS NOTES
Office Visit    Patient Name: Regino Lawrence    : 1945  MRN: [id number]      Assessment/Plan:  Regino Lawrence is a 74 y.o. female who presents today for :    Status post fall  Left hip pain  -     X-Ray Hip 2 View Left; Future; Expected date: 2020  -     HYDROcodone-acetaminophen (NORCO) 5-325 mg per tablet; Take 1 tablet by mouth every 6 (six) hours as needed.  Dispense: 28 tablet; Refill: 0  Acute pain of left knee - mostly resolved  -activity modification d/w patient: avoid prolonged standing, avoid heavy lifting and provocative movements   -ROM stretching and strengthening exercises demonstrated in clinic and handouts given to patient  -RTC in 4-6 weeks if not better, or sooner if pain worsens.  -patient advised to use OTC Tylenol (325mg tablets) once every 4-6 hours as needed for pain. Potential side effects associated with medications reviewed with patient, which patient voices understanding.  All questions were answered in detail. The patient agrees to the plan of care and we will proceed accordingly.  Follow up for worsening Sx. Urgent care/ED precautions provided.      Hypothyroidism, unspecified type  -     levothyroxine (SYNTHROID) 50 MCG tablet; TAKE 1 TABLET BY MOUTH ONCE DAILY BEFORE BREAKFAST  Dispense: 90 tablet; Refill: 0  -continue Levothyroxine 50mcg   -last TSH shows   Lab Results   Component Value Date    TSH 1.328 10/18/2019       She has upcoming f/u appointment with PCP for her chronic medical conditions.            This note was created by combination of typed  and MModal dictation.  Transcription errors may be present.  If there are any questions, please contact me.        ----------------------------------------------------------------------------------------------------------------------      HPI:  Patient Care Team:  Micaela Mendoza MD as PCP - General (Internal Medicine)  Leti Ruggiero MD as Consulting Physician (Rheumatology)  Edith Pacheco MD as  Consulting Physician (Hematology and Oncology)  Benjie Lau MD as Consulting Physician (Nephrology)  Elio Azevedo MD as Consulting Physician (Ophthalmology)  Joanie Belcher DPM as Consulting Physician (Podiatry)  Alfonso Richards MD as Consulting Physician (Pain Medicine)  Xuan Oliveros MA as Care Coordinator    Regino is a 74 y.o. female with      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, without long-term current use of insulin    Sarcoidosis    Corneal scar, right eye    Nuclear sclerosis    Senile nuclear sclerosis    Immunosuppression    Left shoulder pain    Cervical spinal stenosis    Patient is Yazidism    GERD (gastroesophageal reflux disease)    Debility    S/P cervical spinal fusion    Chronic bilateral low back pain with left-sided sciatica    Degenerative disc disease, lumbar    Poor motor control of trunk    Impaired mobility    Muscle weakness    Iron deficiency anemia    Anemia in CKD (chronic kidney disease)    Refusal of blood transfusions as patient is Yazidism    Current use of steroid medication    DM type 2 without retinopathy    Pseudophakia    Insufficiency of tear film of both eyes    Refractive error    Myopathy    Osteopenia    Calcification of aorta    Dry eye syndrome, bilateral    Neck pain    Lumbar disc herniation with radiculopathy    Acute left-sided low back pain without sciatica    Antalgic gait    Lumbar radiculopathy    Lumbar stenosis with neurogenic claudication    Chronic bilateral low back pain without sciatica    Anemia in chronic  kidney disease    Greater trochanteric bursitis of left hip    Left hip pain    FAUSTIN (dyspnea on exertion)    Chest pain, atypical    Hemispheric retinal vein occlusion with macular edema of left eye    Degenerative joint disease (DJD) of hip    Chronic pain    Dyspnea on exertion    Left sided numbness     This patient is new to me       Patient presents today for :  Fall  that occurred 5 days ago after she slipped on a new pair of socks she was wearing. She landed on the L hip and knee, which caused immediate pain - she did not hit her head, no LOC, no other body parts injured. She initially had swelling and bruising of the L knee after the fall, but these issues have resolved and her L knee pain is mostly gone. However, she continues to have moderate L hip pain. She takes Norco as needed 4x a day for her chronic LBP without any prior adverse side effects , which also helps with the L hip pain. She is able to ambulate on her own weight, but prolonged standing can make the pain worse and is relieved with sitting. She denies recent wt loss/fatigue/malaise.  No BMs changes or urinary changes  No tingling/numbness/weakness.  Denies pain radiation    Hypothyroidism - tolerating medication well, no side effects.   Currently taking daily Levothyroxine 50mcg - need temporary refill until her scheduled appointment with PCP coming up.   Last TSH   Lab Results   Component Value Date    TSH 1.328 10/18/2019           Additional ROS    No CP/SOB/palpitations/swelling  No cough/wheezing/SOB  No nausea/vomiting/abd pain  No rash, no history of allergies to any specific substances              Patient Active Problem List   Diagnosis    Fatigue    Diabetes mellitus type 2, controlled    Hypothyroidism    Combined 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    Essential hypertension    Polyneuropathy    Bilateral thoracic back pain    Bilateral carpal tunnel syndrome    Controlled type 2 diabetes  mellitus 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, without long-term current use of insulin    Sarcoidosis    Corneal scar, right eye    Nuclear sclerosis    Senile nuclear sclerosis    Immunosuppression    Left shoulder pain    Cervical spinal stenosis    Patient is Yazidism    GERD (gastroesophageal reflux disease)    Debility    S/P cervical spinal fusion    Chronic bilateral low back pain with left-sided sciatica    Degenerative disc disease, lumbar    Poor motor control of trunk    Impaired mobility    Muscle weakness    Iron deficiency anemia    Anemia in CKD (chronic kidney disease)    Refusal of blood transfusions as patient is Yazidism    Current use of steroid medication    DM type 2 without retinopathy    Pseudophakia    Insufficiency of tear film of both eyes    Refractive error    Myopathy    Osteopenia    Calcification of aorta    Dry eye syndrome, bilateral    Neck pain    Lumbar disc herniation with radiculopathy    Acute left-sided low back pain without sciatica    Antalgic gait    Lumbar radiculopathy    Lumbar stenosis with neurogenic claudication    Chronic bilateral low back pain without sciatica    Anemia in chronic kidney disease    Greater trochanteric bursitis of left hip    Left hip pain    FAUSTIN (dyspnea on exertion)    Chest pain, atypical    Hemispheric retinal vein occlusion with macular edema of left eye    Degenerative joint disease (DJD) of hip    Chronic pain    Dyspnea on exertion    Left sided numbness       Current Medications  Medications reviewed/updated.     Current Outpatient Medications on File Prior to Visit   Medication Sig Dispense Refill    ACCU-CHEK FASTCLIX LANCING DEV Kit USE AS DIRECTED. 1 each 0    ALCOHOL ANTISEPTIC PADS (ALCOHOL PREP PADS TOP)       atorvastatin (LIPITOR) 20 MG tablet Take 1 tablet (20 mg total) by mouth once daily. 90 tablet 3    blood sugar  diagnostic (ACCU-CHEK SMARTVIEW TEST STRIP) Strp Use as directed to check blood sugar twice daily. 200 strip 3    blood-glucose meter kit Use as instructed 1 each 0    carvedilol (COREG) 25 MG tablet Take 1 tablet (25 mg total) by mouth 2 (two) times daily. 180 tablet 3    cloNIDine (CATAPRES) 0.3 MG tablet Take 1 tablet (0.3 mg total) by mouth 3 (three) times daily. 270 tablet 1    diaper,brief,adult,disposable Misc       epoetin german-epbx (RETACRIT) 10,000 unit/mL imjection Inject 20,000 Units into the skin every 14 (fourteen) days.      fenofibrate 160 MG Tab Take 1 tablet (160 mg total) by mouth once daily. 90 tablet 1    FLUZONE HIGH-DOSE 2019-20, PF, 180 mcg/0.5 mL Syrg PHARMACIST ADMINISTERED IMMUNIZATION ADMINISTERED AT TIME OF DISPENSING  0    folic acid (FOLVITE) 1 MG tablet Take 1 tablet (1,000 mcg total) by mouth once daily. 90 tablet 0    gabapentin (NEURONTIN) 400 MG capsule Take 1 capsule (400 mg total) by mouth 3 (three) times daily. 90 capsule 1    metFORMIN (GLUCOPHAGE) 1000 MG tablet TAKE 1 TABLET BY MOUTH TWICE DAILY WITH MEALS 180 tablet 1    methotrexate 2.5 MG Tab TAKE 6 TABLETS BY MOUTH EVERY 7 DAYS 24 tablet 1    NIFEdipine (PROCARDIA-XL) 30 MG (OSM) 24 hr tablet Take 1 tablet (30 mg total) by mouth once daily. 90 tablet 1    predniSONE (DELTASONE) 5 MG tablet Take 1 tablet (5 mg total) by mouth once daily. 90 tablet 1    tiZANidine (ZANAFLEX) 2 MG tablet Take 2 tablets (4 mg total) by mouth every 8 (eight) hours as needed. 270 tablet 0    [DISCONTINUED] levothyroxine (SYNTHROID) 50 MCG tablet TAKE 1 TABLET BY MOUTH ONCE DAILY BEFORE BREAKFAST 90 tablet 1    calcium carbonate (OS-MALCOLM) 600 mg calcium (1,500 mg) Tab Take 1 tablet by mouth 2 (two) times daily.       oxybutynin (DITROPAN-XL) 5 MG TR24 Take 1 tablet (5 mg total) by mouth once daily. 30 tablet 11    [DISCONTINUED] HYDROcodone-acetaminophen (NORCO) 5-325 mg per tablet Take 1 tablet by mouth every 6 (six) hours as  needed. 90 tablet 0     No current facility-administered medications on file prior to visit.        Past Surgical History:   Procedure Laterality Date    CARPAL TUNNEL RELEASE      Rt wrist    CATARACT EXTRACTION W/  INTRAOCULAR LENS IMPLANT Right 2015    Dr. Azevedo    CATARACT EXTRACTION W/  INTRAOCULAR LENS IMPLANT Left 2015    Dr. Azevedo     SECTION      CHOLECYSTECTOMY      INJECTION OF JOINT Left 3/21/2019    Procedure: Injection, Joint  fLUOROSCOPIC jOINT iNJECTION (hIP iNJECTION) LEFT ROCH BURSA AS WELL LEFT TROCHANTERIC BURSA;  Surgeon: Alfonso Richards MD;  Location: [facility];  Service: Pain Management;  Laterality: Left;  NEEDS CONSENT, DIABETIC    INJECTION OF JOINT Left 2019    Procedure: Injection, Joint FLUOROSCOPIC JOINT INJECTION (HIP INJECTION) LEFT HIP;  Surgeon: Alfonso Richards MD;  Location: [facility];  Service: Pain Management;  Laterality: Left;  NEEDS CONSENT    INJECTION OF JOINT Left 2019    Procedure: INJECTION, JOINT;  Surgeon: Alfonso Richards MD;  Location: [facility];  Service: Pain Management;  Laterality: Left;  Left Hip and Left GTB Injections    TRANSFORAMINAL EPIDURAL INJECTION OF STEROID Bilateral 2019    Procedure: INJECTION, STEROID, EPIDURAL, TRANSFORAMINAL APPROACH;  Surgeon: Alfonso Richards MD;  Location: [facility];  Service: Pain Management;  Laterality: Bilateral;  B/L TF RHIANNA L5  Consent Needed    TUBAL LIGATION         Family History   Problem Relation Age of Onset    Hypertension Mother     Cataracts Mother     No Known Problems Father     Hypertension Maternal Grandmother     Glaucoma Sister     Arthritis Sister     No Known Problems Brother     No Known Problems Maternal Aunt     No Known Problems Maternal Uncle     No Known Problems Paternal Aunt     No Known Problems Paternal Uncle     No Known Problems Maternal Grandfather     No Known Problems Paternal Grandmother     No Known Problems Paternal  Grandfather     Kidney failure Sister     Hepatitis Sister     Cancer Sister         bone cancer     Immunodeficiency Sister     Diabetes Son     Hypertension Son     Lupus Neg Hx     Rheum arthritis Neg Hx     Amblyopia Neg Hx     Blindness Neg Hx     Macular degeneration Neg Hx     Retinal detachment Neg Hx     Strabismus Neg Hx     Stroke Neg Hx     Thyroid disease Neg Hx     Endometrial cancer Neg Hx     Vaginal cancer Neg Hx     Cervical cancer Neg Hx        Social History     Socioeconomic History    Marital status:      Spouse name: Not on file    Number of children: Not on file    Years of education: Not on file    Highest education level: Not on file   Occupational History    Not on file   Social Needs    Financial resource strain: Not on file    Food insecurity:     Worry: Not on file     Inability: Not on file    Transportation needs:     Medical: Not on file     Non-medical: Not on file   Tobacco Use    Smoking status: Never Smoker    Smokeless tobacco: Never Used   Substance and Sexual Activity    Alcohol use: No    Drug use: No    Sexual activity: Not Currently     Partners: Male   Lifestyle    Physical activity:     Days per week: Not on file     Minutes per session: Not on file    Stress: Not on file   Relationships    Social connections:     Talks on phone: Not on file     Gets together: Not on file     Attends Latter day service: Not on file     Active member of club or organization: Not on file     Attends meetings of clubs or organizations: Not on file     Relationship status: Not on file   Other Topics Concern    Not on file   Social History Narrative    Not on file           Allergies   Review of patient's allergies indicates:   Allergen Reactions    Azathioprine Shortness Of Breath and Other (See Comments)     Fatigue             Review of Systems  See HPI      Physical Exam  /68 (BP Location: Left arm, Patient Position: Sitting, BP Method:  "Medium (Manual))   Pulse 86   Temp 98.1 °F (36.7 °C) (Oral)   Ht 5' 1" (1.549 m)   Wt 54.5 kg (120 lb 4.2 oz)   LMP  (LMP Unknown)   SpO2 97%   BMI 22.72 kg/m²     GEN: NAD, well developed, pleasant, well nourished  HEENT: NCAT, PERRLA, EOMI, sclera clear, anicteric, O/P clear, MMM with no lesions  NECK: normal, supple with midline trachea, no LAD, no thyromegaly  LUNGS: CTAB, no w/r/r, no increased work of breathing   HEART: RRR, normal S1 and S2, no m/r/g, no edema  ABD: s/nt/nd, NABS  SKIN: normal turgor, no rashes  PSYCH: AOx3, appropriate mood and affect  MSK: warm/well perfused, normal ROM in all extremities, no c/c/e.  Neck: has FROM. No TTP - no pain with posterior neck forward flexion/backward extension.Neg Spurling's.No swelling/redness.  SHOULDER: b/l shoulder with FROM.   Normal flexion/extention/abduction/adduction/empty can/push off/internal and external rotation. No swelling/erythema/TTP.   HIP: FROM of b/l hip with no gross abnormality. Mild limping gait due to pain, but otherwise no TTP at hip joint/SI joint. Normal spine/back exam. No crepitus. Normal flexion/extention/abduction/adduction  KNEE: no gross abnormality on visual exam, bilateral knee with FROM. Right/Left knee with mild tenderness to deep palpation. No crepitus, no joint line TTP. NEG anterior/posterior drawer and Lachman's test. No laxity. Normal gait. Calf and thigh are nonTTP. Neurovascularly intact distally. No effusion/redness. No bruising nor effusion on my exam today  NEURO: normal without focal findings, CN II-XII are grossly intact.  Sensation/strength grossly normal, gait and station normal.               At least 40 minutes were spent today with the patient in the office, which more than half the time was spent in counseling regarding management as above, as well as answering questions and addressing patient's concerns. Pt voices understanding of what was discussed and has no other questions at this time.      "

## 2020-01-17 ENCOUNTER — INFUSION (OUTPATIENT)
Dept: INFUSION THERAPY | Facility: HOSPITAL | Age: 75
End: 2020-01-17
Attending: INTERNAL MEDICINE
Payer: MEDICARE

## 2020-01-17 VITALS
RESPIRATION RATE: 16 BRPM | HEART RATE: 85 BPM | TEMPERATURE: 98 F | DIASTOLIC BLOOD PRESSURE: 79 MMHG | SYSTOLIC BLOOD PRESSURE: 169 MMHG | OXYGEN SATURATION: 96 %

## 2020-01-17 DIAGNOSIS — D50.9 IRON DEFICIENCY ANEMIA, UNSPECIFIED IRON DEFICIENCY ANEMIA TYPE: ICD-10-CM

## 2020-01-17 DIAGNOSIS — D63.1 ANEMIA IN CHRONIC KIDNEY DISEASE, UNSPECIFIED CKD STAGE: Primary | ICD-10-CM

## 2020-01-17 DIAGNOSIS — Z53.1 REFUSAL OF BLOOD TRANSFUSIONS AS PATIENT IS JEHOVAH'S WITNESS: ICD-10-CM

## 2020-01-17 DIAGNOSIS — N18.9 ANEMIA IN CHRONIC KIDNEY DISEASE, UNSPECIFIED CKD STAGE: Primary | ICD-10-CM

## 2020-01-17 PROCEDURE — 96372 THER/PROPH/DIAG INJ SC/IM: CPT | Mod: HCNC

## 2020-01-17 PROCEDURE — 63600175 PHARM REV CODE 636 W HCPCS: Mod: HCNC | Performed by: INTERNAL MEDICINE

## 2020-01-17 RX ADMIN — EPOETIN ALFA-EPBX 20000 UNITS: 10000 INJECTION, SOLUTION INTRAVENOUS; SUBCUTANEOUS at 11:01

## 2020-01-17 NOTE — PLAN OF CARE
Patient arrived to unit for Retacrit injection. Plan of care reviewed, patient agreeable to plan. Patient tolerated injection well. VSS. Discharge instructions reviewed, patient instructed to return 1/21/20 for Dr. Pacheco and 1/31/20 for labs and possible Retacrit injection. Patient left unit via electric mobility scooter accompanied by family. Patient in NAD at time of discharge.

## 2020-01-21 ENCOUNTER — OFFICE VISIT (OUTPATIENT)
Dept: HEMATOLOGY/ONCOLOGY | Facility: CLINIC | Age: 75
End: 2020-01-21
Payer: MEDICARE

## 2020-01-21 VITALS
SYSTOLIC BLOOD PRESSURE: 128 MMHG | OXYGEN SATURATION: 96 % | WEIGHT: 127.63 LBS | DIASTOLIC BLOOD PRESSURE: 79 MMHG | BODY MASS INDEX: 24.1 KG/M2 | HEART RATE: 87 BPM | TEMPERATURE: 98 F | HEIGHT: 61 IN

## 2020-01-21 DIAGNOSIS — M54.42 CHRONIC BILATERAL LOW BACK PAIN WITH LEFT-SIDED SCIATICA: ICD-10-CM

## 2020-01-21 DIAGNOSIS — N18.9 ANEMIA IN CHRONIC KIDNEY DISEASE, UNSPECIFIED CKD STAGE: Primary | ICD-10-CM

## 2020-01-21 DIAGNOSIS — N18.9 CHRONIC KIDNEY DISEASE, UNSPECIFIED CKD STAGE: ICD-10-CM

## 2020-01-21 DIAGNOSIS — G89.29 CHRONIC BILATERAL LOW BACK PAIN WITH LEFT-SIDED SCIATICA: ICD-10-CM

## 2020-01-21 DIAGNOSIS — D63.1 ANEMIA IN CHRONIC KIDNEY DISEASE, UNSPECIFIED CKD STAGE: Primary | ICD-10-CM

## 2020-01-21 DIAGNOSIS — E11.9 DM TYPE 2 WITHOUT RETINOPATHY: ICD-10-CM

## 2020-01-21 PROCEDURE — 1159F PR MEDICATION LIST DOCUMENTED IN MEDICAL RECORD: ICD-10-PCS | Mod: HCNC,S$GLB,, | Performed by: INTERNAL MEDICINE

## 2020-01-21 PROCEDURE — 99214 OFFICE O/P EST MOD 30 MIN: CPT | Mod: HCNC,S$GLB,, | Performed by: INTERNAL MEDICINE

## 2020-01-21 PROCEDURE — 99214 PR OFFICE/OUTPT VISIT, EST, LEVL IV, 30-39 MIN: ICD-10-PCS | Mod: HCNC,S$GLB,, | Performed by: INTERNAL MEDICINE

## 2020-01-21 PROCEDURE — 3078F DIAST BP <80 MM HG: CPT | Mod: HCNC,CPTII,S$GLB, | Performed by: INTERNAL MEDICINE

## 2020-01-21 PROCEDURE — 3078F PR MOST RECENT DIASTOLIC BLOOD PRESSURE < 80 MM HG: ICD-10-PCS | Mod: HCNC,CPTII,S$GLB, | Performed by: INTERNAL MEDICINE

## 2020-01-21 PROCEDURE — 99999 PR PBB SHADOW E&M-EST. PATIENT-LVL V: CPT | Mod: PBBFAC,HCNC,, | Performed by: INTERNAL MEDICINE

## 2020-01-21 PROCEDURE — 1101F PT FALLS ASSESS-DOCD LE1/YR: CPT | Mod: HCNC,CPTII,S$GLB, | Performed by: INTERNAL MEDICINE

## 2020-01-21 PROCEDURE — 3044F PR MOST RECENT HEMOGLOBIN A1C LEVEL <7.0%: ICD-10-PCS | Mod: HCNC,CPTII,S$GLB, | Performed by: INTERNAL MEDICINE

## 2020-01-21 PROCEDURE — 1125F PR PAIN SEVERITY QUANTIFIED, PAIN PRESENT: ICD-10-PCS | Mod: HCNC,S$GLB,, | Performed by: INTERNAL MEDICINE

## 2020-01-21 PROCEDURE — 3074F PR MOST RECENT SYSTOLIC BLOOD PRESSURE < 130 MM HG: ICD-10-PCS | Mod: HCNC,CPTII,S$GLB, | Performed by: INTERNAL MEDICINE

## 2020-01-21 PROCEDURE — 1101F PR PT FALLS ASSESS DOC 0-1 FALLS W/OUT INJ PAST YR: ICD-10-PCS | Mod: HCNC,CPTII,S$GLB, | Performed by: INTERNAL MEDICINE

## 2020-01-21 PROCEDURE — 99499 UNLISTED E&M SERVICE: CPT | Mod: HCNC,S$GLB,, | Performed by: INTERNAL MEDICINE

## 2020-01-21 PROCEDURE — 1159F MED LIST DOCD IN RCRD: CPT | Mod: HCNC,S$GLB,, | Performed by: INTERNAL MEDICINE

## 2020-01-21 PROCEDURE — 99499 RISK ADDL DX/OHS AUDIT: ICD-10-PCS | Mod: HCNC,S$GLB,, | Performed by: INTERNAL MEDICINE

## 2020-01-21 PROCEDURE — 1125F AMNT PAIN NOTED PAIN PRSNT: CPT | Mod: HCNC,S$GLB,, | Performed by: INTERNAL MEDICINE

## 2020-01-21 PROCEDURE — 3074F SYST BP LT 130 MM HG: CPT | Mod: HCNC,CPTII,S$GLB, | Performed by: INTERNAL MEDICINE

## 2020-01-21 PROCEDURE — 3044F HG A1C LEVEL LT 7.0%: CPT | Mod: HCNC,CPTII,S$GLB, | Performed by: INTERNAL MEDICINE

## 2020-01-21 PROCEDURE — 99999 PR PBB SHADOW E&M-EST. PATIENT-LVL V: ICD-10-PCS | Mod: PBBFAC,HCNC,, | Performed by: INTERNAL MEDICINE

## 2020-01-21 RX ORDER — GABAPENTIN 400 MG/1
400 CAPSULE ORAL 3 TIMES DAILY
Qty: 90 CAPSULE | Refills: 1 | Status: SHIPPED | OUTPATIENT
Start: 2020-01-21 | End: 2020-06-01

## 2020-01-21 NOTE — PROGRESS NOTES
Subjective:       Patient ID: Regino Lawrence is a 74 y.o. female.    Chief Complaint: Follow-up  Diagnosis: Anemia in CKD  Patient is a Sabianism  .        The patient is seen today for chronic anemia in CKD. The patient reports that she has been diagnosed with JOLLY in the past.  She has been on oral iron supplementation therapy, but could not tolerate or did not respond, she is uncertain.  She is followed by GI and has undergone a colonoscopy earlier this year and was diagnosed with hemorrhoids for which she underwent banding procedure.  No melena, hematochezia,change in bowel habits.  She has also been diagnosed with B12 deficiency in the past, but reports she did not respond to B12 injections. No history of blood transfusions.  She is a Sabianism.  She reports that she remembers getting injections in the 1970s when her blood count was low.        She is followed by Rheumatology for history of sarcoidosis with associated myopathy and arthropathy.   .She has been treated in the  past with methotrexate and Plaquenil, both of which were ineffective  Cellcept and imuran caused some unknown side effect.   Colchicine  held due to low WBC       Chronic diffuse arthralgias-stable     She was undergoing Procrit therapy q 2wks  Procrit therapy recently on Hold  ( lab parameters)       She was recently hospitalized 10/18/2019  She presented with SOB and dyspnea on exertion for the last 2 months.The CTA was negative for PE. She has also seen a cardiologist. Her echo/stress test in 4/2019 were with EF of 55% and no ischemia. She denies any leg swelling associated with this. She was treated for a possible  Sarcoidosis flare-up      She also has  undergone intermittent IV iron therapy    No new issues today  She continues with chronic arthralgias  No SOB/CP/cough   Appetite and weight stable   Hb 10.9g/dl       She is followed by pain mgmt for chronic hip pain   She was diagnosed with bursitis of hip and recently  "underwent steroid inj  history of cervical spinal stenosis s/p posterior C3-C7 laminectomy and fusion on 11/16/15 by Dr. Conner.          CBC 2019  reveals wbc 4370/mm3  Hb 10.9 g/dl Hct 34.1% Plt ct 281k      Patient was in the ED 2018 and was seen on   [facility]  She is followed by Neurosurgery for cervical spinal stenosis s/p posterior C3-C7 laminectomy and fusion      PAST MEDICAL HISTORY:  Acid reflux, alopecia, anemia, anxiety disorder, chronic  kidney disease, depression, diabetes mellitus type 2, hyperlipidemia,  hypertension, hypothyroidism, osteoporosis, sarcoidosis.    PAST SURGICAL HISTORY:  Cholecystectomy, , tubal ligation, carpal tunnel release, cataracts.    FAMILY HISTORY: Unremarkable for cancer. Significant for HTN.       Review of Systems   Constitutional: Negative for activity change, appetite change and fatigue.   HENT: Negative for hearing loss and nosebleeds.    Eyes: Negative for visual disturbance.   Respiratory: Negative for cough and shortness of breath.    Cardiovascular: Negative for chest pain and leg swelling.   Gastrointestinal: Negative for abdominal pain, constipation, diarrhea and nausea.   Genitourinary: Negative for flank pain and urgency.   Musculoskeletal: Positive for arthralgias and back pain. Negative for gait problem and joint swelling.   Skin: Negative for rash.        No petechiae, ecchymoses   Neurological: Negative for light-headedness and headaches.   Hematological: Negative for adenopathy. Does not bruise/bleed easily.       Objective:       .  Vitals:    20 0947   BP: 128/79   BP Location: Left arm   Patient Position: Sitting   BP Method: Medium (Automatic)   Pulse: 87   Temp: 98 °F (36.7 °C)   TempSrc: Oral   SpO2: 96%   Weight: 57.9 kg (127 lb 10.3 oz)   Height: 5' 1" (1.549 m)       Physical Exam   Constitutional: She is oriented to person, place, and time. She appears well-developed and well-nourished.   HENT:   Head: " Normocephalic.   Mouth/Throat: Oropharynx is clear and moist. No oropharyngeal exudate.   Eyes: Pupils are equal, round, and reactive to light. Conjunctivae and lids are normal. No scleral icterus.   Neck: Normal range of motion. Neck supple. No thyromegaly present.   Cardiovascular: Normal rate, regular rhythm and normal heart sounds.   No murmur heard.  Pulmonary/Chest: Breath sounds normal. She has no wheezes. She has no rales.   Abdominal: Soft. Bowel sounds are normal. She exhibits no distension and no mass. There is no hepatosplenomegaly. There is no tenderness. There is no rebound and no guarding.   Musculoskeletal: Normal range of motion. She exhibits no edema or tenderness.   Neurological: She is alert and oriented to person, place, and time. No cranial nerve deficit. Coordination normal.   Skin: Skin is warm and dry. No ecchymosis, no petechiae and no rash noted. No erythema.   Psychiatric: She has a normal mood and affect.             Lab Results   Component Value Date    WBC 4.37 01/17/2020    HGB 10.9 (L) 01/17/2020    HCT 34.1 (L) 01/17/2020     (H) 01/17/2020     01/17/2020     Lab Results   Component Value Date    IRON 94 01/17/2020    TIBC 392 01/17/2020    FERRITIN 295 01/17/2020     SPEP-nl          CT renal 3/6/2017   IMPRESSION:  1.  No renal, ureteral or bladder calculi.  No hydronephrosis or ureterectasis.  2.  Poorly distended bladder.  Mild bladder wall prominence.  Mild cystitis cannot be   excluded.  3.  Moderate constipation.  Normal appendix      Lab Results   Component Value Date    IRON 94 01/17/2020    TIBC 392 01/17/2020    FERRITIN 295 01/17/2020     Lab Results   Component Value Date    WBC 4.37 01/17/2020    HGB 10.9 (L) 01/17/2020    HCT 34.1 (L) 01/17/2020     (H) 01/17/2020     01/17/2020         Assessment:       1. Anemia in chronic kidney disease, unspecified CKD stage    2. Chronic kidney disease, unspecified CKD stage    3. DM type 2 without  retinopathy        Plan:   1-3   Pt clinically stable  Pt is a Taoist and declines/not interested in blood and blood products due to Nondenominational beliefs    CBC 1/17/2019  reveals wbc 4370/mm3  Hb 10.9 g/dl Hct 34.1% Plt ct 281k  Ferritin 295  Fe sats 24  Cont t EPO inj  20,000u q 2wk ( per lab parameters)  Pt followed by Nephrology . It has been determined early CKD stage III, suspect due to age-related renal nephron loss, along with possible diabetic nephropathy v. hypertensive nephrosclerosis      CBC q2wks STANDING  Cont EPO q 2wks( pending lab parameters)  Hb responding    DM well -controlled  Hba1c  6.2%     Follow-up with PCP for med mgmt  F/u 2 mos with Fe studies    Advance Care Planning     Power of   I initiated the process of advance care planning today and explained the importance of this process to the patient.  I introduced the concept of advance directives to the patient, as well. Then the patient received detailed information about the importance of designating a Health Care Power of  (HCPOA). She was also instructed to communicate with this person about their wishes for future healthcare, should she become sick and lose decision-making capacity. The patient has  previously appointed a HCPOA. Pt completed in 2015           CC: Micaela Mendoza M.D.

## 2020-01-21 NOTE — Clinical Note
Cont EPO injections every 2 wks on FRI with cbc prior to injections on Fridays( standing orders) Fe studies piror to f/u at [facility] in 2mos

## 2020-01-26 ENCOUNTER — PATIENT MESSAGE (OUTPATIENT)
Dept: FAMILY MEDICINE | Facility: CLINIC | Age: 75
End: 2020-01-26

## 2020-01-26 DIAGNOSIS — M51.36 DEGENERATIVE DISC DISEASE, LUMBAR: ICD-10-CM

## 2020-01-27 DIAGNOSIS — M25.552 LEFT HIP PAIN: ICD-10-CM

## 2020-01-27 DIAGNOSIS — M51.36 DEGENERATIVE DISC DISEASE, LUMBAR: ICD-10-CM

## 2020-01-27 RX ORDER — HYDROCODONE BITARTRATE AND ACETAMINOPHEN 5; 325 MG/1; MG/1
1 TABLET ORAL EVERY 6 HOURS PRN
Qty: 90 TABLET | Refills: 0 | OUTPATIENT
Start: 2020-01-27 | End: 2020-02-26

## 2020-01-27 RX ORDER — HYDROCODONE BITARTRATE AND ACETAMINOPHEN 5; 325 MG/1; MG/1
1 TABLET ORAL EVERY 6 HOURS PRN
Qty: 28 TABLET | Refills: 0 | Status: CANCELLED | OUTPATIENT
Start: 2020-01-27 | End: 2020-02-03

## 2020-01-27 RX ORDER — HYDROCODONE BITARTRATE AND ACETAMINOPHEN 5; 325 MG/1; MG/1
1 TABLET ORAL EVERY 6 HOURS PRN
Qty: 62 TABLET | Refills: 0 | Status: SHIPPED | OUTPATIENT
Start: 2020-01-27 | End: 2020-02-07 | Stop reason: SDUPTHER

## 2020-01-27 NOTE — TELEPHONE ENCOUNTER
----- Message from Licha Cirilo sent at 1/27/2020 12:49 PM CST -----  Contact: pt  Type: Patient Call Back    Who called:pt    What is the request in detail:requesting hydrocodone 5-325 mg for hip pain. Call pt  Can the clinic reply by [facility]?    Would the patient rather a call back or a response via [facility]?     Best call back number:[phone]    Additional Information:

## 2020-01-28 ENCOUNTER — PATIENT MESSAGE (OUTPATIENT)
Dept: RHEUMATOLOGY | Facility: CLINIC | Age: 75
End: 2020-01-28

## 2020-01-29 ENCOUNTER — PATIENT MESSAGE (OUTPATIENT)
Dept: RHEUMATOLOGY | Facility: CLINIC | Age: 75
End: 2020-01-29

## 2020-01-29 DIAGNOSIS — D86.9 SARCOIDOSIS: Chronic | ICD-10-CM

## 2020-01-29 DIAGNOSIS — G72.9 MYOPATHY: ICD-10-CM

## 2020-01-29 RX ORDER — PREDNISONE 5 MG/1
5 TABLET ORAL DAILY
Qty: 90 TABLET | Refills: 1 | Status: SHIPPED | OUTPATIENT
Start: 2020-01-29 | End: 2020-06-08 | Stop reason: SDUPTHER

## 2020-01-29 NOTE — PROGRESS NOTES
Subjective:      Patient ID: Regino Lawrence is a 74 y.o. female.    Chief Complaint: Hip Pain and Low-back Pain    Ms Lawrence is a 73 yo female here for follow up of low back pain and left hip pain.  s/p posterior C3-C7 laminectomy and fusion on 11/16/2015.  She was last seen by me on 9/25/2019 and has had low back for years and was having more right hip pain at last visit she was going to try PT and consider other injections.  She was sent to pain management for left hip joint injection done on 3/21/2019 and then again 7/21/2019.  She also had left hip and GTB injection 9/12/2019.  She feels like the recent injection was very helpful.  It helped left hip. She had Bilateral L5 transforaminal epidural steroid injection 12/5/2019.  She had a fall two weeks ago and landed on hip.  She had x-ray.  She has pain with walking and she has a hard time getting out of bed.  She does not feel like the injections in the back helped the leg.  She has swelling in the left foot.  She feels like the pain is in the hip.  Then she has pain on the side of the calf and into the top of the foot and she cannot bend toes.  She feels like her legs give out.  She feels like she cannot pick anything up.  She feels like better still pain is worse with walking.  She feels like she spends most of her time in bed.  She feels like first step is what hurts the most.  She has a cane and walker.  She cannot use the walker in house.  She feels like legs get weak.  She does try to get up.  She cannot lie on the left side.  She has throbbing pain in the lower back.  She has not been to make it to PT due to the pain.  She says she moves in the house.  She says she knows how important it is to move.      Interventional Therapies:   10/8/18 - left L5 transforaminal epidural steroid injection - good relief of back pain no relief of left lower extremity symptoms  12/6/18 - bilateral L5 transforaminal epidural steroid injection - good relief of back pain no  relief of left lower extremity symptoms  3/21/2019 left hip injection  7/21/2019 left hip joint injection  9/12/2019 left hip and GTB injection 100%  12/5/2019 bilateral L5 TF RHIANNA    MRI lumbar 9/2018  The distal cord/conus demonstrates normal size and signal.  No evidence of osteomyelitis, marrow replacement process, or acute fracture.  No paraspinal masses.    At L1-2, there is asymmetric disc bulging to the right, resulting in mild right-sided neural foraminal narrowing.  No spinal canal stenosis.    L1-2 is unremarkable.    L2-3 demonstrates mild disc bulging slightly asymmetric to the left resulting in mild left-sided neural foraminal narrowing.  No spinal canal stenosis.    L4-5 demonstrates minimal anterolisthesis anterolisthesis.  There is mild facet arthropathy and disc bulging.  This results in mild left-sided neural foraminal narrowing.  No spinal canal stenosis.    At L5-S1, there is a moderate sized left lateral recess/foraminal disc extrusion effacing the left neural foramen, likely impinging upon the exiting left L5 nerve root.  There is left-sided degenerative disc disease, noting disc height loss and sub endplate marrow edema.  There is moderate bilateral facet arthropathy.  No spinal canal stenosis.    Impression      Moderate size left foraminal disc extrusion at L5-S1, likely impinging upon the exiting left L5 nerve root.    Additional lumbar spondylosis, resulting in mild neural foraminal narrowing at L1-2, L2-3, and L4-5, as above.  No spinal canal stenosis.    X-ray hip 1/2019  No evidence for acute fracture, dislocation or destructive process.  Marginal osteophytes noted bilaterally, slightly more pronounced on the left.  Joint spaces appear maintained.  Degenerative changes of the lumbar spine are evident.  SI joints and sacrum demonstrate no acute finding.  Moderate amount of stool is seen throughout the colon.  Surrounding soft tissues appear unremarkable.    Ct cervical 7/2018  There is  postsurgical change of posterior spinal fusion and laminectomies from C3 to C7 with bilateral lateral mass screws, stabilization rods, and bone material.  No surrounding lucency to suggest loosening.  No hardware fracture or other evidence of hardware failure.    There is straightening of the normal cervical lordosis.  The vertebral body heights appear relatively well-maintained.  There is no evidence of fracture or osseous lytic or blastic process.  There is intervertebral disc space height loss, most significant at C5-C6 and C6-C7. Focal degenerative changes are described below.    C2 -- C3: Posterior disc osteophyte complex and facet arthropathy without significant spinal canal stenosis or neuroforaminal narrowing.    C3 -- C4: Postsurgical change with posterior disc osteophyte complex without significant spinal canal stenosis or neuroforaminal narrowing.    C4 -- C5: Postsurgical change with posterior disc osteophyte complex, mild uncovertebral spurring, and facet arthropathy resulting in mild left neuroforaminal narrowing.  No significant spinal canal stenosis.    C5 -- C6: Postsurgical change with posterior disc osteophyte complex, facet arthropathy, and uncovertebral spurring resulting in mild right and severe left neuroforaminal narrowing.  No significant spinal canal stenosis.    C6 -- C7: Postsurgical change with posterior disc osteophyte complex, uncovertebral spurring, and facet arthropathy resulting in mild bilateral neuroforaminal narrowing.  No significant spinal canal stenosis.    C7 -- T1: No significant disc abnormality, spinal canal stenosis, or neuroforaminal narrowing.    The spinal canal otherwise appears unremarkable, noting evaluation at postsurgical levels is somewhat limited due to metallic artifact.  No intradural abnormalities are identified.  Evaluation of the surrounding soft tissues reveals unchanged appearance of a small osseous body within the left posterior neck.  A 0.9 x 0.7 cm  soft tissue opacity within the posterior left parotid gland.  This focus has slightly enlarged since CT 2016.  There is biapical pulmonary scarring.    Impression      Postsurgical change of C3-C7 laminectomy with posterior instrumented spinal fusion, unchanged.    Cervical spondylosis, most significant at C5-C6 with severe left and mild right neural foraminal narrowing at this level.  Additional multilevel neural foraminal narrowing as detailed above.  No significant spinal canal stenosis at any level.    0.9 cm left parotid soft tissue opacity, slightly enlarged since CT 2016 and possibly correlating with a prominent intraparotid lymph node or adenoma.  Further evaluation is recommended with dedicated parotid ultrasound.      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, without   long-term current use of insulin  No date: Depression  No date: Diabetes mellitus, type 2  as a child : Eye injury      Comment:  k-abrasion  od  No date: Hyperlipidemia  No date: Hypertension  No date: Hypothyroidism  No date: Immune deficiency disorder  No date: Immune disorder  2012: Myalgia and myositis  No date: Osteoporosis  No date: Polyneuropathy  No date: Renal manifestation of secondary diabetes mellitus  No date: Sarcoidosis  No date: Ulcer      Comment:  no cancer  No date: Urinary incontinence    Past Surgical History:  No date: CARPAL TUNNEL RELEASE      Comment:  Rt wrist  2015: CATARACT EXTRACTION W/  INTRAOCULAR LENS IMPLANT; Right      Comment:  Dr. Azevedo  2015: CATARACT EXTRACTION W/  INTRAOCULAR LENS IMPLANT; Left      Comment:  Dr. Azevedo  No date:  SECTION  No date:  CHOLECYSTECTOMY  11/28/2016: COLPOCLEISIS-- [place]; N/A      Comment:  Performed by Meredith Becker DO at [facility] OR  11/28/2016: CYSTOSCOPY; N/A      Comment:  Performed by Meredith Becker DO at [facility] OR  12/6/2018: Injection,steroid,epidural,transforaminal approach      Bilateral L5; Bilateral      Comment:  Performed by Alfonso Richards MD at [facility] PAIN MGT  10/8/2018: Injection,steroid,epidural,transforaminal approach  Left   L5-S1; Left      Comment:  Performed by Alfonso Richards MD at [facility] PAIN MGT  5/21/2015: INSERTION-INTRAOCULAR LENS (IOL); Left      Comment:  Performed by Elio Azevedo MD at [facility] OR  4/30/2015: INSERTION-INTRAOCULAR LENS (IOL); Right      Comment:  Performed by Elio Azevedo MD at [facility] OR  11/16/2015: LAMINECTOMY-CERVICAL/FUSION-POSTERIOR C3-C7; N/A      Comment:  Performed by Fab Conner MD at [facility] OR 2ND FLR  5/21/2015: PHACOEMULSIFICATION-ASPIRATION-CATARACT; Left      Comment:  Performed by Elio Azevedo MD at [facility] OR  4/30/2015: PHACOEMULSIFICATION-ASPIRATION-CATARACT; Right      Comment:  Performed by Elio Azevedo MD at [facility] OR  11/28/2016: REPAIR-POSTERIOR; N/A      Comment:  Performed by Meredith Becker DO at [facility] OR  11/28/2016: SLING-TRANSOBTERATOR TAPE; N/A      Comment:  Performed by Meredith Becker DO at [facility] OR  No date: TUBAL LIGATION    Review of patient's family history indicates:  Problem: Hypertension      Relation: Mother          Age of Onset: (Not Specified)  Problem: Cataracts      Relation: Mother          Age of Onset: (Not Specified)  Problem: No Known Problems      Relation: Father          Age of Onset: (Not Specified)  Problem: Hypertension      Relation: Maternal Grandmother          Age of Onset: (Not Specified)  Problem: Glaucoma      Relation: Sister          Age of Onset: (Not Specified)  Problem: Arthritis      Relation: Sister          Age of Onset: (Not Specified)  Problem: No Known Problems       Relation: Brother          Age of Onset: (Not Specified)  Problem: No Known Problems      Relation: Maternal Aunt          Age of Onset: (Not Specified)  Problem: No Known Problems      Relation: Maternal Uncle          Age of Onset: (Not Specified)  Problem: No Known Problems      Relation: Paternal Aunt          Age of Onset: (Not Specified)  Problem: No Known Problems      Relation: Paternal Uncle          Age of Onset: (Not Specified)  Problem: No Known Problems      Relation: Maternal Grandfather          Age of Onset: (Not Specified)  Problem: No Known Problems      Relation: Paternal Grandmother          Age of Onset: (Not Specified)  Problem: No Known Problems      Relation: Paternal Grandfather          Age of Onset: (Not Specified)  Problem: Kidney failure      Relation: Sister          Age of Onset: (Not Specified)  Problem: Hepatitis      Relation: Sister          Age of Onset: (Not Specified)  Problem: Cancer      Relation: Sister          Age of Onset: (Not Specified)          Comment: bone cancer   Problem: Immunodeficiency      Relation: Sister          Age of Onset: (Not Specified)  Problem: Lupus      Relation: Neg Hx          Age of Onset: (Not Specified)  Problem: Rheum arthritis      Relation: Neg Hx          Age of Onset: (Not Specified)  Problem: Amblyopia      Relation: Neg Hx          Age of Onset: (Not Specified)  Problem: Blindness      Relation: Neg Hx          Age of Onset: (Not Specified)  Problem: Diabetes      Relation: Neg Hx          Age of Onset: (Not Specified)  Problem: Macular degeneration      Relation: Neg Hx          Age of Onset: (Not Specified)  Problem: Retinal detachment      Relation: Neg Hx          Age of Onset: (Not Specified)  Problem: Strabismus      Relation: Neg Hx          Age of Onset: (Not Specified)  Problem: Stroke      Relation: Neg Hx          Age of Onset: (Not Specified)  Problem: Thyroid disease      Relation: Neg Hx          Age of Onset: (Not  Specified)  Problem: Endometrial cancer      Relation: Neg Hx          Age of Onset: (Not Specified)  Problem: Vaginal cancer      Relation: Neg Hx          Age of Onset: (Not Specified)  Problem: Cervical cancer      Relation: Neg Hx          Age of Onset: (Not Specified)      Social History    Socioeconomic History      Marital status:       Spouse name: Not on file      Number of children: Not on file      Years of education: Not on file      Highest education level: Not on file    Social Needs      Financial resource strain: Not on file      Food insecurity - worry: Not on file      Food insecurity - inability: Not on file      Transportation needs - medical: Not on file      Transportation needs - non-medical: Not on file    Occupational History      Not on file    Tobacco Use      Smoking status: Never Smoker      Smokeless tobacco: Never Used    Substance and Sexual Activity      Alcohol use: No      Drug use: No      Sexual activity: Yes        Partners: Male    Other Topics      Concerns:        Not on file    Social History Narrative      Not on file      Current Outpatient Medications:  ACCU-CHEK FASTCLIX LANCING DEV Kit, USE AS DIRECTED., Disp: 1 each, Rfl: 0  ACCU-CHEK SMARTVIEW TEST STRIP Strp, TEST  ONCE  A  DAY, Disp: 100 strip, Rfl: 11  ALCOHOL ANTISEPTIC PADS (ALCOHOL PREP PADS TOP), , Disp: , Rfl:   amLODIPine (NORVASC) 10 MG tablet, Take 1 tablet (10 mg total) by mouth once daily., Disp: 90 tablet, Rfl: 1  blood-glucose meter kit, Use as instructed, Disp: 1 each, Rfl: 0  calcium carbonate (OS-MALCOLM) 600 mg calcium (1,500 mg) Tab, Take 1 tablet by mouth 2 (two) times daily. , Disp: , Rfl:   carvedilol (COREG) 25 MG tablet, Take 1 tablet (25 mg total) by mouth 2 (two) times daily., Disp: 180 tablet, Rfl: 3  cloNIDine (CATAPRES) 0.3 MG tablet, Take 1 tablet (0.3 mg total) by mouth 3 (three) times daily., Disp: 270 tablet, Rfl: 1  epoetin german (PROCRIT INJ), Inject 1,000 Units as directed.,  Disp: , Rfl:   fenofibrate 160 MG Tab, Take 1 tablet (160 mg total) by mouth once daily., Disp: 90 tablet, Rfl: 1  fexofenadine (ALLEGRA ALLERGY) 180 MG tablet, Take 180 mg by mouth daily as needed. , Disp: , Rfl:   folic acid (FOLVITE) 1 MG tablet, Take 1 tablet (1 mg total) by mouth once daily., Disp: 90 tablet, Rfl: 0  gabapentin (NEURONTIN) 400 MG capsule, Take 1 capsule (400 mg total) by mouth 2 (two) times daily., Disp: 180 capsule, Rfl: 1  HYDROcodone-acetaminophen (NORCO) 5-325 mg per tablet, Take 1 tablet by mouth every 6 (six) hours as needed., Disp: 90 tablet, Rfl: 0  levothyroxine (SYNTHROID) 50 MCG tablet, TAKE 1 TABLET BY MOUTH ONCE DAILY BEFORE BREAKFAST, Disp: 90 tablet, Rfl: 1  metFORMIN (GLUCOPHAGE) 1000 MG tablet, Take 1 tablet (1,000 mg total) by mouth 2 (two) times daily with meals., Disp: 180 tablet, Rfl: 1  methotrexate 2.5 MG Tab, TAKE 6 TABLETS BY MOUTH EVERY 7 DAYS, Disp: 72 tablet, Rfl: 0  predniSONE (DELTASONE) 10 MG tablet, , Disp: , Rfl:     No current facility-administered medications for this visit.       Review of patient's allergies indicates:  No Known Allergies          Review of Systems   Constitution: Negative for weight gain and weight loss.   Cardiovascular: Negative for chest pain.   Respiratory: Positive for shortness of breath.    Musculoskeletal: Positive for back pain (left leg) and joint pain (left foot). Negative for joint swelling.   Gastrointestinal: Positive for nausea. Negative for abdominal pain, bowel incontinence and vomiting.   Genitourinary: Negative for bladder incontinence.   Neurological: Negative for numbness and paresthesias.         Objective:        General: Regino is well-developed, well-nourished, appears stated age, in no acute distress, alert and oriented to time, place and person.     General    Vitals reviewed.  Constitutional: She is oriented to person, place, and time. She appears well-developed and well-nourished.   HENT:   Head: Normocephalic  and atraumatic.   Pulmonary/Chest: Effort normal.   Neurological: She is alert and oriented to person, place, and time.   Psychiatric: She has a normal mood and affect. Her behavior is normal. Judgment and thought content normal.         Left Hip Exam     Tenderness  Also left side trochanteric tenderness.    Range of Motion   Extension: -10   External rotation: 40   Internal rotation: 10       Back (L-Spine & T-Spine) / Neck (C-Spine) Exam     Tenderness   The patient is tender to palpation of the left side trochanteric.     Back (L-Spine & T-Spine) Range of Motion   Extension: 0   Flexion: 70   Lateral bend right: 10   Lateral bend left: 10   Rotation right: 30   Rotation left: 30     Spinal Sensation   Right Side Sensation  C-Spine Level: normal   L-Spine Level: normal  S-Spine Level: normal  Left Side Sensation  C-Spine Level: normal  L-Spine Level: normal  S-Spine Level: normal    Other She has no scoliosis .  Spinal Kyphosis:  Absent      Muscle Strength   Right Upper Extremity   Biceps: 5/5/5   Deltoid:  5/5  Triceps:  5/5  Wrist extension: 5/5/5   Finger Flexors:  5/5  Left Upper Extremity  Biceps: 5/5/5   Deltoid:  5/5  Triceps:  5/5  Wrist extension: 5/5/5   Finger Flexors:  5/5  Right Lower Extremity   Hip Flexion: 5/5   Quadriceps:  5/5   Anterior tibial:  5/5/5  EHL:  5/5  Left Lower Extremity   Hip Flexion: 5/5   Quadriceps:  5/5   Anterior tibial:  5/5/5   EHL:  5/5    Reflexes     Left Side  Biceps:  2+  Triceps:  2+  Brachioradialis:  2+  Quadriceps:  2+  Achilles:  2+  Left Silverman's Sign:  Absent  Babinski Sign:  absent    Right Side   Biceps:  2+  Triceps:  2+  Brachioradialis:  2+  Quadriceps:  2+  Achilles:  2+  Right Silverman's Sign:  absent  Babinski Sign:  absent    Vascular Exam     Right Pulses        Carotid:                  2+    Left Pulses        Carotid:                  2+              Assessment:       1. Greater trochanteric bursitis of left hip    2. Left hip pain    3. DDD  (degenerative disc disease), lumbar           Plan:       Orders Placed This Encounter    Large Joint Aspiration/Injection: L greater trochanteric bursa     1.   Left hip joint injection have been helpful   She got 100% worth of relief.  She does have some degenerative changes in her hip  2.  She has back pain and DDD and she has had injections in her back which help back pain but not the left leg pain.  The injection in her hip helped the left leg pain.  I think the leg pain is coming from hip.  We discussed going back to ortho since the hip joint injection helps.  We could consider a repeat MRI of the lumbar spine.  She is very focused on hip and bursitis today  3.  Currently having flare of bursitis and trouble sleeping and moving due to left outer hip pain.  Will do a left trochanteric bursa injection today.  A time out was performed, the correct patient, procedure, site, and position confirmed.  She felt immediate relief  4.  Appointment with Dr. Richards to consider repeat hip injection.  She felt like the RHIANNA helped the back pain  5. PT for back and core strengthening, ITB stretches and glute strengthening and HEP she has not been able to go.  I encourage her to stay active and keep moving.  She says she does and then she also says she doesn't.    6.  RTC 4 months          Follow-up: Follow up in about 4 months (around 5/31/2020). If there are any questions prior to this, the patient was instructed to contact the office.

## 2020-01-31 ENCOUNTER — OFFICE VISIT (OUTPATIENT)
Dept: SPINE | Facility: CLINIC | Age: 75
End: 2020-01-31
Attending: PHYSICAL MEDICINE & REHABILITATION
Payer: MEDICARE

## 2020-01-31 VITALS
BODY MASS INDEX: 23.98 KG/M2 | HEART RATE: 84 BPM | WEIGHT: 127 LBS | HEIGHT: 61 IN | SYSTOLIC BLOOD PRESSURE: 139 MMHG | DIASTOLIC BLOOD PRESSURE: 89 MMHG

## 2020-01-31 DIAGNOSIS — M25.552 LEFT HIP PAIN: ICD-10-CM

## 2020-01-31 DIAGNOSIS — M70.62 GREATER TROCHANTERIC BURSITIS OF LEFT HIP: Primary | ICD-10-CM

## 2020-01-31 DIAGNOSIS — M51.36 DDD (DEGENERATIVE DISC DISEASE), LUMBAR: ICD-10-CM

## 2020-01-31 PROCEDURE — 20610 DRAIN/INJ JOINT/BURSA W/O US: CPT | Mod: HCNC,LT,S$GLB, | Performed by: PHYSICAL MEDICINE & REHABILITATION

## 2020-01-31 PROCEDURE — 1101F PT FALLS ASSESS-DOCD LE1/YR: CPT | Mod: HCNC,CPTII,S$GLB, | Performed by: PHYSICAL MEDICINE & REHABILITATION

## 2020-01-31 PROCEDURE — 1159F PR MEDICATION LIST DOCUMENTED IN MEDICAL RECORD: ICD-10-PCS | Mod: HCNC,S$GLB,, | Performed by: PHYSICAL MEDICINE & REHABILITATION

## 2020-01-31 PROCEDURE — 99999 PR PBB SHADOW E&M-EST. PATIENT-LVL III: ICD-10-PCS | Mod: PBBFAC,HCNC,, | Performed by: PHYSICAL MEDICINE & REHABILITATION

## 2020-01-31 PROCEDURE — 99214 PR OFFICE/OUTPT VISIT, EST, LEVL IV, 30-39 MIN: ICD-10-PCS | Mod: 25,HCNC,S$GLB, | Performed by: PHYSICAL MEDICINE & REHABILITATION

## 2020-01-31 PROCEDURE — 3079F PR MOST RECENT DIASTOLIC BLOOD PRESSURE 80-89 MM HG: ICD-10-PCS | Mod: HCNC,CPTII,S$GLB, | Performed by: PHYSICAL MEDICINE & REHABILITATION

## 2020-01-31 PROCEDURE — 1101F PR PT FALLS ASSESS DOC 0-1 FALLS W/OUT INJ PAST YR: ICD-10-PCS | Mod: HCNC,CPTII,S$GLB, | Performed by: PHYSICAL MEDICINE & REHABILITATION

## 2020-01-31 PROCEDURE — 3075F SYST BP GE 130 - 139MM HG: CPT | Mod: HCNC,CPTII,S$GLB, | Performed by: PHYSICAL MEDICINE & REHABILITATION

## 2020-01-31 PROCEDURE — 20610 LARGE JOINT ASPIRATION/INJECTION: L GREATER TROCHANTERIC BURSA: ICD-10-PCS | Mod: HCNC,LT,S$GLB, | Performed by: PHYSICAL MEDICINE & REHABILITATION

## 2020-01-31 PROCEDURE — 3079F DIAST BP 80-89 MM HG: CPT | Mod: HCNC,CPTII,S$GLB, | Performed by: PHYSICAL MEDICINE & REHABILITATION

## 2020-01-31 PROCEDURE — 3075F PR MOST RECENT SYSTOLIC BLOOD PRESS GE 130-139MM HG: ICD-10-PCS | Mod: HCNC,CPTII,S$GLB, | Performed by: PHYSICAL MEDICINE & REHABILITATION

## 2020-01-31 PROCEDURE — 1125F AMNT PAIN NOTED PAIN PRSNT: CPT | Mod: HCNC,S$GLB,, | Performed by: PHYSICAL MEDICINE & REHABILITATION

## 2020-01-31 PROCEDURE — 1159F MED LIST DOCD IN RCRD: CPT | Mod: HCNC,S$GLB,, | Performed by: PHYSICAL MEDICINE & REHABILITATION

## 2020-01-31 PROCEDURE — 99999 PR PBB SHADOW E&M-EST. PATIENT-LVL III: CPT | Mod: PBBFAC,HCNC,, | Performed by: PHYSICAL MEDICINE & REHABILITATION

## 2020-01-31 PROCEDURE — 1125F PR PAIN SEVERITY QUANTIFIED, PAIN PRESENT: ICD-10-PCS | Mod: HCNC,S$GLB,, | Performed by: PHYSICAL MEDICINE & REHABILITATION

## 2020-01-31 PROCEDURE — 99214 OFFICE O/P EST MOD 30 MIN: CPT | Mod: 25,HCNC,S$GLB, | Performed by: PHYSICAL MEDICINE & REHABILITATION

## 2020-01-31 RX ORDER — TRIAMCINOLONE ACETONIDE 40 MG/ML
40 INJECTION, SUSPENSION INTRA-ARTICULAR; INTRAMUSCULAR
Status: DISCONTINUED | OUTPATIENT
Start: 2020-01-31 | End: 2020-01-31 | Stop reason: HOSPADM

## 2020-01-31 RX ADMIN — TRIAMCINOLONE ACETONIDE 40 MG: 40 INJECTION, SUSPENSION INTRA-ARTICULAR; INTRAMUSCULAR at 01:01

## 2020-01-31 NOTE — PROCEDURES
Large Joint Aspiration/Injection: L greater trochanteric bursa  Date/Time: 1/31/2020 1:54 PM  Performed by: Elaine Biggs MD  Authorized by: Elaine Biggs MD     Consent Done?:  Yes (Verbal)  Indications:  Pain  Procedure site marked: Yes    Timeout: Prior to procedure the correct patient, procedure, and site was verified    Anesthesia  Local anesthesia used  Anesthesia: local infiltration  Anesthetic: lidocaine 1% without epinephrine  Anesthetic total: 2mL    Location:  Hip  Site:  L greater trochanteric bursa  Prep: Patient was prepped and draped in usual sterile fashion    Medications:  40 mg triamcinolone acetonide 40 mg/mL  Patient tolerance:  Patient tolerated the procedure well with no immediate complications

## 2020-02-05 ENCOUNTER — PROCEDURE VISIT (OUTPATIENT)
Dept: OPHTHALMOLOGY | Facility: CLINIC | Age: 75
End: 2020-02-05
Attending: OPHTHALMOLOGY
Payer: MEDICARE

## 2020-02-05 DIAGNOSIS — H34.8120 HEMISPHERIC RETINAL VEIN OCCLUSION WITH MACULAR EDEMA OF LEFT EYE: ICD-10-CM

## 2020-02-05 PROCEDURE — 92134 POSTERIOR SEGMENT OCT RETINA (OCULAR COHERENCE TOMOGRAPHY)-BOTH EYES: ICD-10-PCS | Mod: HCNC,S$GLB,, | Performed by: OPHTHALMOLOGY

## 2020-02-05 PROCEDURE — 99499 NO LOS: ICD-10-PCS | Mod: HCNC,S$GLB,, | Performed by: OPHTHALMOLOGY

## 2020-02-05 PROCEDURE — 67028 PR INJECT INTRAVITREAL PHARMCOLOGIC: ICD-10-PCS | Mod: HCNC,LT,S$GLB, | Performed by: OPHTHALMOLOGY

## 2020-02-05 PROCEDURE — 92134 CPTRZ OPH DX IMG PST SGM RTA: CPT | Mod: HCNC,S$GLB,, | Performed by: OPHTHALMOLOGY

## 2020-02-05 PROCEDURE — 99499 UNLISTED E&M SERVICE: CPT | Mod: HCNC,S$GLB,, | Performed by: OPHTHALMOLOGY

## 2020-02-05 PROCEDURE — 67028 INJECTION EYE DRUG: CPT | Mod: HCNC,LT,S$GLB, | Performed by: OPHTHALMOLOGY

## 2020-02-05 RX ADMIN — Medication 1.25 MG: at 02:02

## 2020-02-05 NOTE — PROGRESS NOTES
Subjective:       Patient ID: Regino Lawrence is a 74 y.o. female      Chief Complaint   Patient presents with    Diabetic Eye Exam     History of Present Illness  HPI     DLS 10/28/19 by Dr. Baker here today for FU/Treatment of CRVO - OD/   NPDR    Pt states that the vision stable with no changes noticed. Just eyes are   itching and running water a lot and crust in them in the morning. No   pain,flashes,floaters, or double vision.     Eye Med(s) - Artificial Tears - OU PRN    Hemoglobin A1C       Date                     Value               Ref Range             Status                09/20/2019               6.2 (H)             4.0 - 5.6 %           Final                 03/12/2019               6.1 (H)             4.0 - 5.6 %           Final                 01/22/2018               6.0 (H)             4.0 - 5.6 %           Final                  Last edited by Hemanth Baker MD on 2/5/2020  5:01 PM. (History)        Imaging:    See report    Assessment/Plan:     1. 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  Pt missed appt  ME controlled.  Still with CWS.  Discussed obs vs inj and RBA  Pt wishes to proceed with inj.  Feels OS is better eye.    - Posterior Segment OCT Retina-Both eyes  - Prior Authorization Order  - Posterior Segment OCT Retina-Both eyes; Future    Follow up in about 3 months (around 5/5/2020), or if symptoms worsen or fail to improve, for Comprehensive Examination, OCT Mac, Injection Left eye, Avastin.     Patient identified.  Timeout performed.    Risks, benefits, and alternatives to treatment were discussed in detail with the patient, including bleeding/infection (endophthalmitis)/etc.  The patient voiced understanding and wished to proceed with the procedure.  See separate consent form.    Injection Procedure Note:  Diagnosis: RVO with ME Left Eye    Topical Proparacaine drop placed then topical 5%  Betadine  Sterile gloves used, and sterile lid speculum placed.  5% Betadine placed at injection site again prior to injection.  Avastin 1.25mg in 0.05cc Injected inferotemporally 3.5-4mm posterior to the limbus.  Complications: None  Va at least CF at 5 feet post injection.  Retina, ONH, IOP normal after injection.    Followup as above.  Patient should return immediately PRN.  Retinal Detachment and Endophthalmitis precautions given.

## 2020-02-05 NOTE — PATIENT INSTRUCTIONS
..POST INTRAVITREAL INJECTION PATIENT INSTRUCTIONS   Below are some guidelines for what to expect after your treatment and additional care instructions.   * you may experience mild discomfort in your eye after the treatment. Your eye usually feels better within 24 to 48 hours after the procedure.   * you have been given drops that numb the surface of your eye.   DO NOT rub or touch your eye, DO NOT wear contacts until numbness goes away.   * you may experience small spots that appear in your field of vision, these are usually caused by an air bubble or from the medication. It taked a few hours or days for these to go away.   * use of sunglasses will help reduce light sensitivity and glare.   * DO NOT swim   for at least 3 hours after the injection   * If you have a gritty, burning, irritating or stinging feeling in the injected eye. This may be a result of the antiseptic used. Use artifical tears or eye lubricant ( from over- the- counter from your local pharmacy ). If you have some at home already please check the expiration date, so not to use anything contaminated in your eye. A cool pack over your eye brow above the injected eye may also relieve discomfort.   Call us right away or go to the Emergency Department if you have a dramatic decrease in vision or extreme pain in the eye.   [facility] [phone]

## 2020-02-07 ENCOUNTER — OFFICE VISIT (OUTPATIENT)
Dept: FAMILY MEDICINE | Facility: CLINIC | Age: 75
End: 2020-02-07
Payer: MEDICARE

## 2020-02-07 VITALS
TEMPERATURE: 98 F | SYSTOLIC BLOOD PRESSURE: 150 MMHG | OXYGEN SATURATION: 97 % | BODY MASS INDEX: 23.52 KG/M2 | HEART RATE: 83 BPM | WEIGHT: 124.56 LBS | DIASTOLIC BLOOD PRESSURE: 80 MMHG | HEIGHT: 61 IN

## 2020-02-07 DIAGNOSIS — I70.0 CALCIFICATION OF AORTA: ICD-10-CM

## 2020-02-07 DIAGNOSIS — M51.36 DEGENERATIVE DISC DISEASE, LUMBAR: ICD-10-CM

## 2020-02-07 DIAGNOSIS — E11.8 CONTROLLED TYPE 2 DIABETES MELLITUS WITH COMPLICATION, WITHOUT LONG-TERM CURRENT USE OF INSULIN: ICD-10-CM

## 2020-02-07 DIAGNOSIS — I10 ESSENTIAL HYPERTENSION: Chronic | ICD-10-CM

## 2020-02-07 DIAGNOSIS — F11.90 CHRONIC, CONTINUOUS USE OF OPIOIDS: Primary | ICD-10-CM

## 2020-02-07 DIAGNOSIS — M46.1 SACROILIITIS: ICD-10-CM

## 2020-02-07 DIAGNOSIS — D84.9 IMMUNOSUPPRESSION: ICD-10-CM

## 2020-02-07 PROBLEM — M54.50 ACUTE LEFT-SIDED LOW BACK PAIN WITHOUT SCIATICA: Status: RESOLVED | Noted: 2018-09-26 | Resolved: 2020-02-07

## 2020-02-07 PROCEDURE — 1159F MED LIST DOCD IN RCRD: CPT | Mod: HCNC,S$GLB,, | Performed by: FAMILY MEDICINE

## 2020-02-07 PROCEDURE — 1101F PT FALLS ASSESS-DOCD LE1/YR: CPT | Mod: HCNC,CPTII,S$GLB, | Performed by: FAMILY MEDICINE

## 2020-02-07 PROCEDURE — 99499 RISK ADDL DX/OHS AUDIT: ICD-10-PCS | Mod: HCNC,S$GLB,, | Performed by: FAMILY MEDICINE

## 2020-02-07 PROCEDURE — 3077F SYST BP >= 140 MM HG: CPT | Mod: HCNC,CPTII,S$GLB, | Performed by: FAMILY MEDICINE

## 2020-02-07 PROCEDURE — 1101F PR PT FALLS ASSESS DOC 0-1 FALLS W/OUT INJ PAST YR: ICD-10-PCS | Mod: HCNC,CPTII,S$GLB, | Performed by: FAMILY MEDICINE

## 2020-02-07 PROCEDURE — 1126F PR PAIN SEVERITY QUANTIFIED, NO PAIN PRESENT: ICD-10-PCS | Mod: HCNC,S$GLB,, | Performed by: FAMILY MEDICINE

## 2020-02-07 PROCEDURE — 99999 PR PBB SHADOW E&M-EST. PATIENT-LVL V: ICD-10-PCS | Mod: PBBFAC,HCNC,, | Performed by: FAMILY MEDICINE

## 2020-02-07 PROCEDURE — 3044F HG A1C LEVEL LT 7.0%: CPT | Mod: HCNC,CPTII,S$GLB, | Performed by: FAMILY MEDICINE

## 2020-02-07 PROCEDURE — 99214 OFFICE O/P EST MOD 30 MIN: CPT | Mod: HCNC,S$GLB,, | Performed by: FAMILY MEDICINE

## 2020-02-07 PROCEDURE — 3079F DIAST BP 80-89 MM HG: CPT | Mod: HCNC,CPTII,S$GLB, | Performed by: FAMILY MEDICINE

## 2020-02-07 PROCEDURE — 3044F PR MOST RECENT HEMOGLOBIN A1C LEVEL <7.0%: ICD-10-PCS | Mod: HCNC,CPTII,S$GLB, | Performed by: FAMILY MEDICINE

## 2020-02-07 PROCEDURE — 3079F PR MOST RECENT DIASTOLIC BLOOD PRESSURE 80-89 MM HG: ICD-10-PCS | Mod: HCNC,CPTII,S$GLB, | Performed by: FAMILY MEDICINE

## 2020-02-07 PROCEDURE — 1159F PR MEDICATION LIST DOCUMENTED IN MEDICAL RECORD: ICD-10-PCS | Mod: HCNC,S$GLB,, | Performed by: FAMILY MEDICINE

## 2020-02-07 PROCEDURE — 99999 PR PBB SHADOW E&M-EST. PATIENT-LVL V: CPT | Mod: PBBFAC,HCNC,, | Performed by: FAMILY MEDICINE

## 2020-02-07 PROCEDURE — 3077F PR MOST RECENT SYSTOLIC BLOOD PRESSURE >= 140 MM HG: ICD-10-PCS | Mod: HCNC,CPTII,S$GLB, | Performed by: FAMILY MEDICINE

## 2020-02-07 PROCEDURE — 99214 PR OFFICE/OUTPT VISIT, EST, LEVL IV, 30-39 MIN: ICD-10-PCS | Mod: HCNC,S$GLB,, | Performed by: FAMILY MEDICINE

## 2020-02-07 PROCEDURE — 1126F AMNT PAIN NOTED NONE PRSNT: CPT | Mod: HCNC,S$GLB,, | Performed by: FAMILY MEDICINE

## 2020-02-07 PROCEDURE — 99499 UNLISTED E&M SERVICE: CPT | Mod: HCNC,S$GLB,, | Performed by: FAMILY MEDICINE

## 2020-02-07 RX ORDER — HYDROCODONE BITARTRATE AND ACETAMINOPHEN 5; 325 MG/1; MG/1
1 TABLET ORAL EVERY 6 HOURS PRN
Qty: 90 TABLET | Refills: 0 | Status: SHIPPED | OUTPATIENT
Start: 2020-02-07 | End: 2020-03-08

## 2020-02-07 RX ORDER — HYDROCODONE BITARTRATE AND ACETAMINOPHEN 5; 325 MG/1; MG/1
1 TABLET ORAL EVERY 6 HOURS PRN
Qty: 90 TABLET | Refills: 0 | Status: SHIPPED | OUTPATIENT
Start: 2020-04-07 | End: 2020-05-01 | Stop reason: SDUPTHER

## 2020-02-07 RX ORDER — HYDROCODONE BITARTRATE AND ACETAMINOPHEN 5; 325 MG/1; MG/1
1 TABLET ORAL EVERY 6 HOURS PRN
Qty: 90 TABLET | Refills: 0 | Status: SHIPPED | OUTPATIENT
Start: 2020-03-07 | End: 2020-03-23 | Stop reason: SDUPTHER

## 2020-02-07 NOTE — PROGRESS NOTES
Chief Complaint   Patient presents with    Chronic Pain       HPI  Regino Lawrence is a 74 y.o. female with multiple medical diagnoses as listed in the medical history and problem list that presents for follow-up for chronic pain in her hip and knee    Chronic pain-  Suffers from chronic bursitis in her left hip   She had a fall a month ago in which she slipped and landed on her left hip  S/p an injection by Dr. Biggs but she is still having pain while walking    Sarcoidosis-  Sees Dr. Houser with Rheumatology  Has stopped taking MTX  Reports she feels stronger      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, without long-term current use of insulin    Sarcoidosis    Corneal scar, right eye    Nuclear sclerosis    Senile nuclear sclerosis    Immunosuppression    Left shoulder pain    Cervical spinal stenosis    Patient is Restorationism    GERD (gastroesophageal reflux disease)    Debility    S/P cervical spinal fusion    Chronic bilateral low back pain with left-sided sciatica    Degenerative disc disease, lumbar    Poor motor control of trunk    Impaired mobility    Muscle weakness    Iron deficiency anemia    Anemia in CKD (chronic kidney disease)    Refusal of blood transfusions as patient is Restorationism    Current use of steroid medication    DM type 2 without retinopathy    Pseudophakia    Insufficiency of tear film of both eyes    Refractive error    Myopathy    Osteopenia    Calcification of aorta    Dry eye syndrome, bilateral    Neck pain    Lumbar disc herniation with radiculopathy    Antalgic  "gait    Lumbar radiculopathy    Lumbar stenosis with neurogenic claudication    Chronic bilateral low back pain without sciatica    Anemia in chronic kidney disease    Greater trochanteric bursitis of left hip    Left hip pain    FAUSTIN (dyspnea on exertion)    Chest pain, atypical    Hemispheric retinal vein occlusion with macular edema of left eye    Degenerative joint disease (DJD) of hip    Chronic pain    Dyspnea on exertion    Left sided numbness    Sacroiliitis    Chronic, continuous use of opioids         ROS  Review of Systems   Constitutional: Negative for chills, diaphoresis, fatigue, fever and unexpected weight change.   HENT: Negative for rhinorrhea, sinus pressure, sore throat and tinnitus.    Eyes: Negative for photophobia and visual disturbance.   Respiratory: Negative for cough, shortness of breath and wheezing.    Cardiovascular: Negative for chest pain and palpitations.   Gastrointestinal: Negative for abdominal pain, blood in stool, constipation, diarrhea, nausea and vomiting.   Genitourinary: Negative for dysuria, flank pain, frequency and vaginal discharge.   Musculoskeletal: Positive for arthralgias and back pain. Negative for joint swelling.   Skin: Negative for rash.   Neurological: Negative for speech difficulty, weakness, light-headedness and headaches.   Psychiatric/Behavioral: Negative for behavioral problems and dysphoric mood.       Physical Exam  Vitals:    02/07/20 1129 02/07/20 1236   BP: (!) 160/80 (!) 150/80   BP Location: Right arm    Patient Position: Sitting    BP Method: Small (Manual)    Pulse: 83    Temp: 97.8 °F (36.6 °C)    TempSrc: Oral    SpO2: 97%    Weight: 56.5 kg (124 lb 9 oz)    Height: 5' 1" (1.549 m)     Body mass index is 23.54 kg/m².  Weight: 56.5 kg (124 lb 9 oz)   Height: 5' 1" (154.9 cm)     Physical Exam   Constitutional: She is oriented to person, place, and time. She appears well-developed and well-nourished.   Eyes: EOM are normal. "   Neurological: She is alert and oriented to person, place, and time.   Skin: Skin is warm and dry. No rash noted. No erythema.   Psychiatric: She has a normal mood and affect. Her behavior is normal.   Nursing note and vitals reviewed.      ALLERGIES AND MEDICATIONS: updated and reviewed.  Review of patient's allergies indicates:   Allergen Reactions    Azathioprine Shortness Of Breath and Other (See Comments)     Fatigue     Medication List with Changes/Refills   New Medications    HYDROCODONE-ACETAMINOPHEN (NORCO) 5-325 MG PER TABLET    Take 1 tablet by mouth every 6 (six) hours as needed.    HYDROCODONE-ACETAMINOPHEN (NORCO) 5-325 MG PER TABLET    Take 1 tablet by mouth every 6 (six) hours as needed.   Current Medications    ACCU-CHEK FASTCLIX LANCING DEV KIT    USE AS DIRECTED.    ALCOHOL ANTISEPTIC PADS (ALCOHOL PREP PADS TOP)        ATORVASTATIN (LIPITOR) 20 MG TABLET    Take 1 tablet (20 mg total) by mouth once daily.    BLOOD SUGAR DIAGNOSTIC (ACCU-CHEK SMARTVIEW TEST STRIP) STRP    Use as directed to check blood sugar twice daily.    BLOOD-GLUCOSE METER KIT    Use as instructed    CALCIUM CARBONATE (OS-MALCOLM) 600 MG CALCIUM (1,500 MG) TAB    Take 1 tablet by mouth 2 (two) times daily.     CARVEDILOL (COREG) 25 MG TABLET    Take 1 tablet (25 mg total) by mouth 2 (two) times daily.    CLONIDINE (CATAPRES) 0.3 MG TABLET    Take 1 tablet (0.3 mg total) by mouth 3 (three) times daily.    DIAPER,BRIEF,ADULT,DISPOSABLE MISC        EPOETIN WOLFGANG-EPBX (RETACRIT) 10,000 UNIT/ML IMJECTION    Inject 20,000 Units into the skin every 14 (fourteen) days.    FENOFIBRATE 160 MG TAB    Take 1 tablet (160 mg total) by mouth once daily.    FLUZONE HIGH-DOSE 2019-20, PF, 180 MCG/0.5 ML SYRG    PHARMACIST ADMINISTERED IMMUNIZATION ADMINISTERED AT TIME OF DISPENSING    FOLIC ACID (FOLVITE) 1 MG TABLET    Take 1 tablet (1,000 mcg total) by mouth once daily.    GABAPENTIN (NEURONTIN) 400 MG CAPSULE    Take 1 capsule (400 mg total)  "by mouth 3 (three) times daily.    LEVOTHYROXINE (SYNTHROID) 50 MCG TABLET    TAKE 1 TABLET BY MOUTH ONCE DAILY BEFORE BREAKFAST    METFORMIN (GLUCOPHAGE) 1000 MG TABLET    TAKE 1 TABLET BY MOUTH TWICE DAILY WITH MEALS    NIFEDIPINE (PROCARDIA-XL) 30 MG (OSM) 24 HR TABLET    Take 1 tablet (30 mg total) by mouth once daily.    OXYBUTYNIN (DITROPAN-XL) 5 MG TR24    Take 1 tablet (5 mg total) by mouth once daily.    PREDNISONE (DELTASONE) 5 MG TABLET    Take 1 tablet (5 mg total) by mouth once daily.    TIZANIDINE (ZANAFLEX) 2 MG TABLET    Take 2 tablets (4 mg total) by mouth every 8 (eight) hours as needed.   Changed and/or Refilled Medications    Modified Medication Previous Medication    HYDROCODONE-ACETAMINOPHEN (NORCO) 5-325 MG PER TABLET HYDROcodone-acetaminophen (NORCO) 5-325 mg per tablet       Take 1 tablet by mouth every 6 (six) hours as needed.    Take 1 tablet by mouth every 6 (six) hours as needed.       Assessment & Plan  1. Chronic, continuous use of opioids    2. Controlled type 2 diabetes mellitus with complication, without long-term current use of insulin    3. Sacroiliitis    4. Immunosuppression    5. Calcification of aorta    6. Degenerative disc disease, lumbar    7. Essential hypertension        Problem List Items Addressed This Visit        Neuro    Degenerative disc disease, lumbar  -continue current dose  -symptoms currently are from her hip    Relevant Medications    HYDROcodone-acetaminophen (NORCO) 5-325 mg per tablet    HYDROcodone-acetaminophen (NORCO) 5-325 mg per tablet (Start on 3/7/2020)    HYDROcodone-acetaminophen (NORCO) 5-325 mg per tablet (Start on 4/7/2020)       Psychiatric    Chronic, continuous use of opioids - Primary  -[facility] database queried/reviewed  -continue current pain medication dose  -will schedule f/u for injection with pain management if sx don't improve       Cardiac/Vascular    Calcification of aorta    Overview     "There is plaque in the aorta"  - Xray Chest " 11-         Essential hypertension (Chronic)  -elevated due to pain  -BP check with nurse avoided due to transportation issues  -may be a good candidate for digital medicine, will discuss at next visit       Immunology/Multi System    Immunosuppression (Chronic)  -now on prednisone only, no MTX       Endocrine    Diabetes mellitus type 2, controlled (Chronic)  -controlled  Lab Results   Component Value Date    HGBA1C 6.2 (H) 09/20/2019            Orthopedic    Sacroiliitis  -continue f/u with pain mgmt if recent injection does not improve symptoms          Follow up in about 3 months (around 5/7/2020) for Follow up.    Other Orders Placed This Visit:  No orders of the defined types were placed in this encounter.

## 2020-02-14 ENCOUNTER — LAB VISIT (OUTPATIENT)
Dept: LAB | Facility: HOSPITAL | Age: 75
End: 2020-02-14
Attending: INTERNAL MEDICINE
Payer: MEDICARE

## 2020-02-14 ENCOUNTER — INFUSION (OUTPATIENT)
Dept: INFUSION THERAPY | Facility: HOSPITAL | Age: 75
End: 2020-02-14
Attending: INTERNAL MEDICINE
Payer: MEDICARE

## 2020-02-14 VITALS
RESPIRATION RATE: 17 BRPM | HEART RATE: 81 BPM | TEMPERATURE: 98 F | OXYGEN SATURATION: 97 % | SYSTOLIC BLOOD PRESSURE: 150 MMHG | DIASTOLIC BLOOD PRESSURE: 75 MMHG

## 2020-02-14 DIAGNOSIS — N18.9 ANEMIA IN CHRONIC KIDNEY DISEASE, UNSPECIFIED CKD STAGE: ICD-10-CM

## 2020-02-14 DIAGNOSIS — D63.1 ANEMIA IN CHRONIC KIDNEY DISEASE, UNSPECIFIED CKD STAGE: Primary | ICD-10-CM

## 2020-02-14 DIAGNOSIS — N18.9 ANEMIA IN CHRONIC KIDNEY DISEASE, UNSPECIFIED CKD STAGE: Primary | ICD-10-CM

## 2020-02-14 DIAGNOSIS — Z53.1 REFUSAL OF BLOOD TRANSFUSIONS AS PATIENT IS JEHOVAH'S WITNESS: ICD-10-CM

## 2020-02-14 DIAGNOSIS — D63.1 ANEMIA IN CHRONIC KIDNEY DISEASE, UNSPECIFIED CKD STAGE: ICD-10-CM

## 2020-02-14 LAB
BASOPHILS # BLD AUTO: 0.03 K/UL (ref 0–0.2)
BASOPHILS NFR BLD: 0.4 % (ref 0–1.9)
DIFFERENTIAL METHOD: ABNORMAL
EOSINOPHIL # BLD AUTO: 0.1 K/UL (ref 0–0.5)
EOSINOPHIL NFR BLD: 1.3 % (ref 0–8)
ERYTHROCYTE [DISTWIDTH] IN BLOOD BY AUTOMATED COUNT: 14 % (ref 11.5–14.5)
HCT VFR BLD AUTO: 33.7 % (ref 37–48.5)
HGB BLD-MCNC: 10.7 G/DL (ref 12–16)
IMM GRANULOCYTES # BLD AUTO: 0.12 K/UL (ref 0–0.04)
IMM GRANULOCYTES NFR BLD AUTO: 1.5 % (ref 0–0.5)
LYMPHOCYTES # BLD AUTO: 2 K/UL (ref 1–4.8)
LYMPHOCYTES NFR BLD: 24.8 % (ref 18–48)
MCH RBC QN AUTO: 33.2 PG (ref 27–31)
MCHC RBC AUTO-ENTMCNC: 31.8 G/DL (ref 32–36)
MCV RBC AUTO: 105 FL (ref 82–98)
MONOCYTES # BLD AUTO: 0.7 K/UL (ref 0.3–1)
MONOCYTES NFR BLD: 8.5 % (ref 4–15)
NEUTROPHILS # BLD AUTO: 5.2 K/UL (ref 1.8–7.7)
NEUTROPHILS NFR BLD: 63.5 % (ref 38–73)
NRBC BLD-RTO: 0 /100 WBC
PLATELET # BLD AUTO: 200 K/UL (ref 150–350)
PMV BLD AUTO: 9 FL (ref 9.2–12.9)
RBC # BLD AUTO: 3.22 M/UL (ref 4–5.4)
WBC # BLD AUTO: 8.23 K/UL (ref 3.9–12.7)

## 2020-02-14 PROCEDURE — 85025 COMPLETE CBC W/AUTO DIFF WBC: CPT | Mod: HCNC

## 2020-02-14 PROCEDURE — 63600175 PHARM REV CODE 636 W HCPCS: Mod: HCNC | Performed by: INTERNAL MEDICINE

## 2020-02-14 PROCEDURE — 36415 COLL VENOUS BLD VENIPUNCTURE: CPT | Mod: HCNC

## 2020-02-14 PROCEDURE — 96372 THER/PROPH/DIAG INJ SC/IM: CPT | Mod: HCNC

## 2020-02-14 RX ADMIN — EPOETIN ALFA-EPBX 20000 UNITS: 10000 INJECTION, SOLUTION INTRAVENOUS; SUBCUTANEOUS at 11:02

## 2020-02-14 NOTE — PLAN OF CARE
Patient arrived to unit for Retacrit injection. Plan of care reviewed, patient agreeable to plan. Patient tolerated injection well. VSS. Discharge instructions reviewed, patient instructed to return 2/28/20. Patient left unit via electric mobility scooter accompanied by family. Patient in NAD at time of discharge.

## 2020-02-19 DIAGNOSIS — M79.10 MYALGIA: ICD-10-CM

## 2020-02-19 RX ORDER — TIZANIDINE 2 MG/1
4 TABLET ORAL EVERY 8 HOURS PRN
Qty: 270 TABLET | Refills: 0 | Status: SHIPPED | OUTPATIENT
Start: 2020-02-19 | End: 2020-05-01 | Stop reason: SDUPTHER

## 2020-02-28 DIAGNOSIS — I10 ESSENTIAL HYPERTENSION: Chronic | ICD-10-CM

## 2020-02-28 RX ORDER — NIFEDIPINE 30 MG/1
30 TABLET, EXTENDED RELEASE ORAL DAILY
Qty: 90 TABLET | Refills: 1 | Status: SHIPPED | OUTPATIENT
Start: 2020-02-28 | End: 2020-08-15

## 2020-03-02 DIAGNOSIS — E11.22 DIABETES MELLITUS WITH STAGE 3 CHRONIC KIDNEY DISEASE: ICD-10-CM

## 2020-03-02 DIAGNOSIS — E11.9 DIABETES MELLITUS, TYPE 2: Chronic | ICD-10-CM

## 2020-03-02 DIAGNOSIS — N18.30 DIABETES MELLITUS WITH STAGE 3 CHRONIC KIDNEY DISEASE: ICD-10-CM

## 2020-03-02 RX ORDER — METFORMIN HYDROCHLORIDE 1000 MG/1
1000 TABLET ORAL 2 TIMES DAILY WITH MEALS
Qty: 180 TABLET | Refills: 1 | Status: SHIPPED | OUTPATIENT
Start: 2020-03-02 | End: 2020-09-11 | Stop reason: SDUPTHER

## 2020-03-13 ENCOUNTER — INFUSION (OUTPATIENT)
Dept: INFUSION THERAPY | Facility: HOSPITAL | Age: 75
End: 2020-03-13
Attending: INTERNAL MEDICINE
Payer: MEDICARE

## 2020-03-13 ENCOUNTER — LAB VISIT (OUTPATIENT)
Dept: LAB | Facility: HOSPITAL | Age: 75
End: 2020-03-13
Attending: INTERNAL MEDICINE
Payer: MEDICARE

## 2020-03-13 VITALS
SYSTOLIC BLOOD PRESSURE: 135 MMHG | HEART RATE: 88 BPM | RESPIRATION RATE: 16 BRPM | DIASTOLIC BLOOD PRESSURE: 67 MMHG | OXYGEN SATURATION: 97 % | TEMPERATURE: 98 F

## 2020-03-13 DIAGNOSIS — Z53.1 REFUSAL OF BLOOD TRANSFUSIONS AS PATIENT IS JEHOVAH'S WITNESS: ICD-10-CM

## 2020-03-13 DIAGNOSIS — N18.9 ANEMIA IN CHRONIC KIDNEY DISEASE, UNSPECIFIED CKD STAGE: Primary | ICD-10-CM

## 2020-03-13 DIAGNOSIS — D63.1 ANEMIA IN CHRONIC KIDNEY DISEASE, UNSPECIFIED CKD STAGE: ICD-10-CM

## 2020-03-13 DIAGNOSIS — N18.9 ANEMIA IN CKD (CHRONIC KIDNEY DISEASE): Primary | ICD-10-CM

## 2020-03-13 DIAGNOSIS — D63.1 ANEMIA IN CKD (CHRONIC KIDNEY DISEASE): Primary | ICD-10-CM

## 2020-03-13 DIAGNOSIS — D63.1 ANEMIA IN CHRONIC KIDNEY DISEASE, UNSPECIFIED CKD STAGE: Primary | ICD-10-CM

## 2020-03-13 DIAGNOSIS — N18.9 ANEMIA IN CHRONIC KIDNEY DISEASE, UNSPECIFIED CKD STAGE: ICD-10-CM

## 2020-03-13 LAB
BASOPHILS # BLD AUTO: 0.02 K/UL (ref 0–0.2)
BASOPHILS NFR BLD: 0.3 % (ref 0–1.9)
DIFFERENTIAL METHOD: ABNORMAL
EOSINOPHIL # BLD AUTO: 0 K/UL (ref 0–0.5)
EOSINOPHIL NFR BLD: 0.1 % (ref 0–8)
ERYTHROCYTE [DISTWIDTH] IN BLOOD BY AUTOMATED COUNT: 13.2 % (ref 11.5–14.5)
HCT VFR BLD AUTO: 33.3 % (ref 37–48.5)
HGB BLD-MCNC: 10.4 G/DL (ref 12–16)
IMM GRANULOCYTES # BLD AUTO: 0.09 K/UL (ref 0–0.04)
IMM GRANULOCYTES NFR BLD AUTO: 1.2 % (ref 0–0.5)
LYMPHOCYTES # BLD AUTO: 1.5 K/UL (ref 1–4.8)
LYMPHOCYTES NFR BLD: 21 % (ref 18–48)
MCH RBC QN AUTO: 32.6 PG (ref 27–31)
MCHC RBC AUTO-ENTMCNC: 31.2 G/DL (ref 32–36)
MCV RBC AUTO: 104 FL (ref 82–98)
MONOCYTES # BLD AUTO: 0.4 K/UL (ref 0.3–1)
MONOCYTES NFR BLD: 5.6 % (ref 4–15)
NEUTROPHILS # BLD AUTO: 5.3 K/UL (ref 1.8–7.7)
NEUTROPHILS NFR BLD: 71.8 % (ref 38–73)
NRBC BLD-RTO: 0 /100 WBC
PLATELET # BLD AUTO: 235 K/UL (ref 150–350)
PMV BLD AUTO: 9.3 FL (ref 9.2–12.9)
RBC # BLD AUTO: 3.19 M/UL (ref 4–5.4)
WBC # BLD AUTO: 7.34 K/UL (ref 3.9–12.7)

## 2020-03-13 PROCEDURE — 63600175 PHARM REV CODE 636 W HCPCS: Mod: HCNC | Performed by: INTERNAL MEDICINE

## 2020-03-13 PROCEDURE — 36415 COLL VENOUS BLD VENIPUNCTURE: CPT | Mod: HCNC

## 2020-03-13 PROCEDURE — 85025 COMPLETE CBC W/AUTO DIFF WBC: CPT | Mod: HCNC

## 2020-03-13 PROCEDURE — 96372 THER/PROPH/DIAG INJ SC/IM: CPT | Mod: HCNC

## 2020-03-13 RX ADMIN — EPOETIN ALFA-EPBX 20000 UNITS: 10000 INJECTION, SOLUTION INTRAVENOUS; SUBCUTANEOUS at 12:03

## 2020-03-13 NOTE — PLAN OF CARE
03/13 Hgb 10.4 Patient received RETAcrit 20,000 units. Tolerated injection well. VSS. Reports increased fatigue and generalized body aches. She received discharge instructions and follow up appointments. She will return in 2 weeks for routine labs and retacrit pending results. Patient verbalized understanding and discharged from unit via mobile scooter accompanied by daughter. Patient in NAD at time of discharge.

## 2020-03-16 ENCOUNTER — TELEPHONE (OUTPATIENT)
Dept: PAIN MEDICINE | Facility: CLINIC | Age: 75
End: 2020-03-16

## 2020-03-16 NOTE — TELEPHONE ENCOUNTER
My name is Staff, I am contacting you from [facility] regarding your appointment scheduled for 03.17.20, with Dr. Richards, just confirming you will be able to make it.    If you feel you need to reschedule or canceled please give our office a call so we can better assist you.      Staff requesting patient to arrive 15 mins ahead of schedule appointment time.    Pt verbalized understanding and has confirmed appt

## 2020-03-17 ENCOUNTER — OFFICE VISIT (OUTPATIENT)
Dept: SPINE | Facility: CLINIC | Age: 75
End: 2020-03-17
Attending: ANESTHESIOLOGY
Payer: MEDICARE

## 2020-03-17 VITALS
HEIGHT: 61 IN | RESPIRATION RATE: 18 BRPM | WEIGHT: 124.56 LBS | OXYGEN SATURATION: 100 % | SYSTOLIC BLOOD PRESSURE: 137 MMHG | BODY MASS INDEX: 23.52 KG/M2 | HEART RATE: 86 BPM | TEMPERATURE: 98 F | DIASTOLIC BLOOD PRESSURE: 67 MMHG

## 2020-03-17 DIAGNOSIS — M51.36 DDD (DEGENERATIVE DISC DISEASE), LUMBAR: ICD-10-CM

## 2020-03-17 DIAGNOSIS — M70.62 GREATER TROCHANTERIC BURSITIS OF LEFT HIP: Primary | ICD-10-CM

## 2020-03-17 DIAGNOSIS — M25.552 LEFT HIP PAIN: ICD-10-CM

## 2020-03-17 PROCEDURE — 1125F PR PAIN SEVERITY QUANTIFIED, PAIN PRESENT: ICD-10-PCS | Mod: HCNC,S$GLB,, | Performed by: ANESTHESIOLOGY

## 2020-03-17 PROCEDURE — 1101F PR PT FALLS ASSESS DOC 0-1 FALLS W/OUT INJ PAST YR: ICD-10-PCS | Mod: HCNC,CPTII,S$GLB, | Performed by: ANESTHESIOLOGY

## 2020-03-17 PROCEDURE — 1101F PT FALLS ASSESS-DOCD LE1/YR: CPT | Mod: HCNC,CPTII,S$GLB, | Performed by: ANESTHESIOLOGY

## 2020-03-17 PROCEDURE — 3075F PR MOST RECENT SYSTOLIC BLOOD PRESS GE 130-139MM HG: ICD-10-PCS | Mod: HCNC,CPTII,S$GLB, | Performed by: ANESTHESIOLOGY

## 2020-03-17 PROCEDURE — 1159F PR MEDICATION LIST DOCUMENTED IN MEDICAL RECORD: ICD-10-PCS | Mod: HCNC,S$GLB,, | Performed by: ANESTHESIOLOGY

## 2020-03-17 PROCEDURE — 3075F SYST BP GE 130 - 139MM HG: CPT | Mod: HCNC,CPTII,S$GLB, | Performed by: ANESTHESIOLOGY

## 2020-03-17 PROCEDURE — 1125F AMNT PAIN NOTED PAIN PRSNT: CPT | Mod: HCNC,S$GLB,, | Performed by: ANESTHESIOLOGY

## 2020-03-17 PROCEDURE — 3078F PR MOST RECENT DIASTOLIC BLOOD PRESSURE < 80 MM HG: ICD-10-PCS | Mod: HCNC,CPTII,S$GLB, | Performed by: ANESTHESIOLOGY

## 2020-03-17 PROCEDURE — 99213 PR OFFICE/OUTPT VISIT, EST, LEVL III, 20-29 MIN: ICD-10-PCS | Mod: HCNC,S$GLB,, | Performed by: ANESTHESIOLOGY

## 2020-03-17 PROCEDURE — 1159F MED LIST DOCD IN RCRD: CPT | Mod: HCNC,S$GLB,, | Performed by: ANESTHESIOLOGY

## 2020-03-17 PROCEDURE — 3078F DIAST BP <80 MM HG: CPT | Mod: HCNC,CPTII,S$GLB, | Performed by: ANESTHESIOLOGY

## 2020-03-17 PROCEDURE — 99999 PR PBB SHADOW E&M-EST. PATIENT-LVL III: ICD-10-PCS | Mod: PBBFAC,HCNC,, | Performed by: ANESTHESIOLOGY

## 2020-03-17 PROCEDURE — 99999 PR PBB SHADOW E&M-EST. PATIENT-LVL III: CPT | Mod: PBBFAC,HCNC,, | Performed by: ANESTHESIOLOGY

## 2020-03-17 PROCEDURE — 99213 OFFICE O/P EST LOW 20 MIN: CPT | Mod: HCNC,S$GLB,, | Performed by: ANESTHESIOLOGY

## 2020-03-17 NOTE — PROGRESS NOTES
Subjective:      Patient ID: Regino Lawrence is a 74 y.o. female.    Chief Complaint: Hip Pain    Referred by: No ref. provider found     HPI  She is here for follow-up and complaining of left lateral hip pain.  She was wishing to get a left trochanteric bursa injection.  No other symptomatology.  Epidural worked well.      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, without long-term current use of insulin     Depression     Diabetes mellitus, type 2     Eye injury as a child     k-abrasion  od    Hyperlipidemia     Hypertension     Hypothyroidism     Immune deficiency disorder     Immune disorder     Myalgia and myositis 2012    Osteoporosis     Polyneuropathy     Renal manifestation of secondary diabetes mellitus     Sarcoidosis     Ulcer     no cancer    Urinary incontinence        Past Surgical History:   Procedure Laterality Date    CARPAL TUNNEL RELEASE      Rt wrist    CATARACT EXTRACTION W/  INTRAOCULAR LENS IMPLANT Right 2015    Dr. Azevedo    CATARACT EXTRACTION W/  INTRAOCULAR LENS IMPLANT Left 2015    Dr. Azevedo     SECTION      CHOLECYSTECTOMY      INJECTION OF JOINT Left 3/21/2019    Procedure: Injection, Joint  fLUOROSCOPIC jOINT iNJECTION (hIP iNJECTION) LEFT ROCH BURSA AS WELL LEFT TROCHANTERIC BURSA;  Surgeon: Alfonso Richards MD;  Location: [facility]IN [facility]GT;  Service: Pain Management;  Laterality: Left;  NEEDS CONSENT, DIABETIC    INJECTION OF JOINT Left 2019    Procedure: Injection, Joint FLUOROSCOPIC JOINT INJECTION (HIP INJECTION) LEFT HIP;  Surgeon: Alfonso Richards MD;  Location: [facility]IN [facility]GT;  Service: Pain Management;  Laterality: Left;   NEEDS CONSENT    INJECTION OF JOINT Left 9/12/2019    Procedure: INJECTION, JOINT;  Surgeon: Alfonso Richards MD;  Location: [facility] PAIN [facility]GT;  Service: Pain Management;  Laterality: Left;  Left Hip and Left GTB Injections    TRANSFORAMINAL EPIDURAL INJECTION OF STEROID Bilateral 12/5/2019    Procedure: INJECTION, STEROID, EPIDURAL, TRANSFORAMINAL APPROACH;  Surgeon: Alfonso Richards MD;  Location: [facility] PAIN [facility]GT;  Service: Pain Management;  Laterality: Bilateral;  B/L TF RHIANNA L5  Consent Needed    TUBAL LIGATION         Review of patient's allergies indicates:   Allergen Reactions    Azathioprine Shortness Of Breath and Other (See Comments)     Fatigue       Current Outpatient Medications   Medication Sig Dispense Refill    ACCU-CHEK FASTCLIX LANCING DEV Kit USE AS DIRECTED. 1 each 0    ALCOHOL ANTISEPTIC PADS (ALCOHOL PREP PADS TOP)       blood sugar diagnostic (ACCU-CHEK SMARTVIEW TEST STRIP) Strp Use as directed to check blood sugar twice daily. 200 strip 3    blood-glucose meter kit Use as instructed 1 each 0    carvedilol (COREG) 25 MG tablet Take 1 tablet (25 mg total) by mouth 2 (two) times daily. 180 tablet 3    cloNIDine (CATAPRES) 0.3 MG tablet Take 1 tablet (0.3 mg total) by mouth 3 (three) times daily. 270 tablet 1    diaper,brief,adult,disposable Misc       epoetin german-epbx (RETACRIT) 10,000 unit/mL imjection Inject 20,000 Units into the skin every 14 (fourteen) days.      fenofibrate 160 MG Tab Take 1 tablet (160 mg total) by mouth once daily. 90 tablet 1    FLUZONE HIGH-DOSE 2019-20, PF, 180 mcg/0.5 mL Syrg PHARMACIST ADMINISTERED IMMUNIZATION ADMINISTERED AT TIME OF DISPENSING  0    folic acid (FOLVITE) 1 MG tablet Take 1 tablet (1,000 mcg total) by mouth once daily. 90 tablet 0    gabapentin (NEURONTIN) 400 MG capsule Take 1 capsule (400 mg total) by mouth 3 (three) times daily. 90 capsule 1    HYDROcodone-acetaminophen (NORCO) 5-325 mg per tablet Take 1 tablet by mouth every 6 (six) hours as  needed. 90 tablet 0    [START ON 4/7/2020] HYDROcodone-acetaminophen (NORCO) 5-325 mg per tablet Take 1 tablet by mouth every 6 (six) hours as needed. 90 tablet 0    levothyroxine (SYNTHROID) 50 MCG tablet TAKE 1 TABLET BY MOUTH ONCE DAILY BEFORE BREAKFAST 90 tablet 0    metFORMIN (GLUCOPHAGE) 1000 MG tablet Take 1 tablet (1,000 mg total) by mouth 2 (two) times daily with meals. 180 tablet 1    NIFEdipine (PROCARDIA-XL) 30 MG (OSM) 24 hr tablet Take 1 tablet (30 mg total) by mouth once daily. 90 tablet 1    predniSONE (DELTASONE) 5 MG tablet Take 1 tablet (5 mg total) by mouth once daily. 90 tablet 1    tiZANidine (ZANAFLEX) 2 MG tablet Take 2 tablets (4 mg total) by mouth every 8 (eight) hours as needed. 270 tablet 0    atorvastatin (LIPITOR) 20 MG tablet Take 1 tablet (20 mg total) by mouth once daily. 90 tablet 3    calcium carbonate (OS-MALCOLM) 600 mg calcium (1,500 mg) Tab Take 1 tablet by mouth 2 (two) times daily.       oxybutynin (DITROPAN-XL) 5 MG TR24 Take 1 tablet (5 mg total) by mouth once daily. 30 tablet 11     No current facility-administered medications for this visit.        Family History   Problem Relation Age of Onset    Hypertension Mother     Cataracts Mother     No Known Problems Father     Hypertension Maternal Grandmother     Glaucoma Sister     Arthritis Sister     No Known Problems Brother     No Known Problems Maternal Aunt     No Known Problems Maternal Uncle     No Known Problems Paternal Aunt     No Known Problems Paternal Uncle     No Known Problems Maternal Grandfather     No Known Problems Paternal Grandmother     No Known Problems Paternal Grandfather     Kidney failure Sister     Hepatitis Sister     Cancer Sister         bone cancer     Immunodeficiency Sister     Diabetes Son     Hypertension Son     Lupus Neg Hx     Rheum arthritis Neg Hx     Amblyopia Neg Hx     Blindness Neg Hx     Macular degeneration Neg Hx     Retinal detachment Neg Hx      "Strabismus Neg Hx     Stroke Neg Hx     Thyroid disease Neg Hx     Endometrial cancer Neg Hx     Vaginal cancer Neg Hx     Cervical cancer Neg Hx        Social History     Socioeconomic History    Marital status:      Spouse name: Not on file    Number of children: Not on file    Years of education: Not on file    Highest education level: Not on file   Occupational History    Not on file   Social Needs    Financial resource strain: Not on file    Food insecurity:     Worry: Not on file     Inability: Not on file    Transportation needs:     Medical: Not on file     Non-medical: Not on file   Tobacco Use    Smoking status: Never Smoker    Smokeless tobacco: Never Used   Substance and Sexual Activity    Alcohol use: No    Drug use: No    Sexual activity: Not Currently     Partners: Male   Lifestyle    Physical activity:     Days per week: Not on file     Minutes per session: Not on file    Stress: Not on file   Relationships    Social connections:     Talks on phone: Not on file     Gets together: Not on file     Attends [employer] service: Not on file     Active member of club or organization: Not on file     Attends meetings of clubs or organizations: Not on file     Relationship status: Not on file   Other Topics Concern    Not on file   Social History Narrative    Not on file           Review of Systems   Musculoskeletal:        Left hip pain           Objective:   /67   Pulse 86   Temp 98.2 °F (36.8 °C)   Resp 18   Ht 5' 1" (1.549 m)   Wt 56.5 kg (124 lb 9 oz)   LMP  (LMP Unknown)   SpO2 100%   BMI 23.54 kg/m²   Pain Disability Index Review:  Last 3 PDI Scores 3/17/2020 10/1/2019 8/13/2019   Pain Disability Index (PDI) 70 50 64     Normocephalic.  Atraumatic.  Affect appropriate.  Breathing unlabored.  Extra ocular muscles intact.           Ortho/SPM Exam    positive tenderness at the lateral aspect of the left hip at the greater trochanter  Assessment:       Encounter " Diagnoses   Name Primary?    Greater trochanteric bursitis of left hip Yes    Left hip pain     DDD (degenerative disc disease), lumbar          Plan:   We discussed with the patient the assessment and recommendations. The following is the plan we agreed on:  1. I discussed with the patient the procedure and I advised her to wait until the corona virus restrictions are over with before she gets a steroid injection.  She voiced understanding and agreed.  She is to follow-up whenever possible after all the restrictions are over with      Aranammeghna was seen today for hip pain.    Diagnoses and all orders for this visit:    Greater trochanteric bursitis of left hip    Left hip pain    DDD (degenerative disc disease), lumbar

## 2020-03-21 DIAGNOSIS — D86.9 SARCOIDOSIS: Chronic | ICD-10-CM

## 2020-03-21 DIAGNOSIS — G72.9 MYOPATHY: ICD-10-CM

## 2020-03-23 ENCOUNTER — PATIENT MESSAGE (OUTPATIENT)
Dept: FAMILY MEDICINE | Facility: CLINIC | Age: 75
End: 2020-03-23

## 2020-03-23 ENCOUNTER — PATIENT MESSAGE (OUTPATIENT)
Dept: RHEUMATOLOGY | Facility: CLINIC | Age: 75
End: 2020-03-23

## 2020-03-23 DIAGNOSIS — M51.36 DEGENERATIVE DISC DISEASE, LUMBAR: ICD-10-CM

## 2020-03-23 RX ORDER — FOLIC ACID 1 MG/1
TABLET ORAL
Qty: 90 TABLET | Refills: 0 | Status: SHIPPED | OUTPATIENT
Start: 2020-03-23 | End: 2020-06-29 | Stop reason: SDUPTHER

## 2020-03-23 RX ORDER — HYDROCODONE BITARTRATE AND ACETAMINOPHEN 5; 325 MG/1; MG/1
1 TABLET ORAL EVERY 6 HOURS PRN
Qty: 90 TABLET | Refills: 0 | Status: SHIPPED | OUTPATIENT
Start: 2020-03-23 | End: 2020-04-22

## 2020-03-24 ENCOUNTER — PATIENT MESSAGE (OUTPATIENT)
Dept: RHEUMATOLOGY | Facility: CLINIC | Age: 75
End: 2020-03-24

## 2020-03-27 ENCOUNTER — INFUSION (OUTPATIENT)
Dept: INFUSION THERAPY | Facility: HOSPITAL | Age: 75
End: 2020-03-27
Attending: INTERNAL MEDICINE
Payer: MEDICARE

## 2020-03-27 VITALS
HEART RATE: 77 BPM | DIASTOLIC BLOOD PRESSURE: 63 MMHG | SYSTOLIC BLOOD PRESSURE: 120 MMHG | TEMPERATURE: 98 F | RESPIRATION RATE: 18 BRPM | OXYGEN SATURATION: 98 %

## 2020-03-27 DIAGNOSIS — N18.9 ANEMIA IN CHRONIC KIDNEY DISEASE, UNSPECIFIED CKD STAGE: Primary | ICD-10-CM

## 2020-03-27 DIAGNOSIS — D63.1 ANEMIA IN CHRONIC KIDNEY DISEASE, UNSPECIFIED CKD STAGE: Primary | ICD-10-CM

## 2020-03-27 DIAGNOSIS — Z53.1 REFUSAL OF BLOOD TRANSFUSIONS AS PATIENT IS JEHOVAH'S WITNESS: ICD-10-CM

## 2020-03-27 PROCEDURE — 63600175 PHARM REV CODE 636 W HCPCS: Mod: HCNC | Performed by: INTERNAL MEDICINE

## 2020-03-27 PROCEDURE — 96372 THER/PROPH/DIAG INJ SC/IM: CPT | Mod: HCNC

## 2020-03-27 RX ADMIN — EPOETIN ALFA-EPBX 20000 UNITS: 10000 INJECTION, SOLUTION INTRAVENOUS; SUBCUTANEOUS at 11:03

## 2020-03-27 NOTE — PLAN OF CARE
Patient arrived to unit for q2 week Retacrit injection. Plan of care reviewed, patient agreeable to plan. Patient tolerated injection well. VSS. Discharge instructions reviewed, patient instructed to return 4/9/20 for lab and next injection. Patient exited unit via mobility scooter, in NAD at time of discharge.

## 2020-03-30 ENCOUNTER — PATIENT MESSAGE (OUTPATIENT)
Dept: RHEUMATOLOGY | Facility: CLINIC | Age: 75
End: 2020-03-30

## 2020-03-30 ENCOUNTER — TELEPHONE (OUTPATIENT)
Dept: HEMATOLOGY/ONCOLOGY | Facility: CLINIC | Age: 75
End: 2020-03-30

## 2020-03-31 ENCOUNTER — PATIENT MESSAGE (OUTPATIENT)
Dept: FAMILY MEDICINE | Facility: CLINIC | Age: 75
End: 2020-03-31

## 2020-03-31 DIAGNOSIS — E11.69 COMBINED HYPERLIPIDEMIA ASSOCIATED WITH TYPE 2 DIABETES MELLITUS: Chronic | ICD-10-CM

## 2020-03-31 DIAGNOSIS — E78.2 COMBINED HYPERLIPIDEMIA ASSOCIATED WITH TYPE 2 DIABETES MELLITUS: Chronic | ICD-10-CM

## 2020-03-31 RX ORDER — FENOFIBRATE 160 MG/1
TABLET ORAL
Qty: 90 TABLET | Refills: 3 | Status: SHIPPED | OUTPATIENT
Start: 2020-03-31 | End: 2021-05-26 | Stop reason: SDUPTHER

## 2020-04-03 DIAGNOSIS — E11.9 TYPE 2 DIABETES MELLITUS WITHOUT COMPLICATION: ICD-10-CM

## 2020-04-07 ENCOUNTER — PATIENT MESSAGE (OUTPATIENT)
Dept: FAMILY MEDICINE | Facility: CLINIC | Age: 75
End: 2020-04-07

## 2020-04-07 DIAGNOSIS — E03.9 HYPOTHYROIDISM, UNSPECIFIED TYPE: ICD-10-CM

## 2020-04-07 RX ORDER — LEVOTHYROXINE SODIUM 50 UG/1
TABLET ORAL
Qty: 90 TABLET | Refills: 1 | Status: SHIPPED | OUTPATIENT
Start: 2020-04-07 | End: 2020-09-18

## 2020-04-09 ENCOUNTER — DOCUMENTATION ONLY (OUTPATIENT)
Dept: INFUSION THERAPY | Facility: HOSPITAL | Age: 75
End: 2020-04-09

## 2020-04-09 ENCOUNTER — LAB VISIT (OUTPATIENT)
Dept: LAB | Facility: HOSPITAL | Age: 75
End: 2020-04-09
Attending: INTERNAL MEDICINE
Payer: MEDICARE

## 2020-04-09 DIAGNOSIS — D63.1 ANEMIA IN CKD (CHRONIC KIDNEY DISEASE): ICD-10-CM

## 2020-04-09 DIAGNOSIS — D63.1 ANEMIA IN CHRONIC KIDNEY DISEASE, UNSPECIFIED CKD STAGE: Primary | ICD-10-CM

## 2020-04-09 DIAGNOSIS — N18.9 ANEMIA IN CKD (CHRONIC KIDNEY DISEASE): ICD-10-CM

## 2020-04-09 DIAGNOSIS — N18.9 ANEMIA IN CHRONIC KIDNEY DISEASE, UNSPECIFIED CKD STAGE: Primary | ICD-10-CM

## 2020-04-09 LAB
ERYTHROCYTE [DISTWIDTH] IN BLOOD BY AUTOMATED COUNT: 13.5 % (ref 11.5–14.5)
HCT VFR BLD AUTO: 35.6 % (ref 37–48.5)
HGB BLD-MCNC: 11.3 G/DL (ref 12–16)
IMM GRANULOCYTES # BLD AUTO: 0.04 K/UL (ref 0–0.04)
MCH RBC QN AUTO: 32.4 PG (ref 27–31)
MCHC RBC AUTO-ENTMCNC: 31.7 G/DL (ref 32–36)
MCV RBC AUTO: 102 FL (ref 82–98)
NEUTROPHILS # BLD AUTO: 4.8 K/UL (ref 1.8–7.7)
PLATELET # BLD AUTO: 283 K/UL (ref 150–350)
PMV BLD AUTO: 9.2 FL (ref 9.2–12.9)
RBC # BLD AUTO: 3.49 M/UL (ref 4–5.4)
WBC # BLD AUTO: 7.02 K/UL (ref 3.9–12.7)

## 2020-04-09 PROCEDURE — 85027 COMPLETE CBC AUTOMATED: CPT | Mod: HCNC

## 2020-04-09 PROCEDURE — 36415 COLL VENOUS BLD VENIPUNCTURE: CPT | Mod: HCNC

## 2020-04-09 NOTE — PROGRESS NOTES
Retacrit held today. Hgb 11.3. Patient instructed to return 4/24/20 for labs and injection pending labs.

## 2020-04-15 ENCOUNTER — PATIENT MESSAGE (OUTPATIENT)
Dept: RHEUMATOLOGY | Facility: CLINIC | Age: 75
End: 2020-04-15

## 2020-04-21 DIAGNOSIS — Z13.9 SCREENING FOR CONDITION: Primary | ICD-10-CM

## 2020-04-21 NOTE — PROGRESS NOTES
04/21/2020      In an effort to protect our immunocompromised patients from potential exposure to COVID-19, [facility] will now require all patients receiving an infusion, an injection, and/or radiation therapy to be tested for COVID-19 prior to their appointment.  All patients currently under treatment will be tested immediately, and patients initiating new treatment cycles or with one-time appointments (injections, transfusions, etc.) must be tested within 72 hours of their appointment.     Placed COVID-19 test order for patient.  A member of our team is to contact the patient in the near future to explain this process and the rationale behind it, to ask the COVID-19 screening questions, and to get the patient scheduled for their COVID-19 test.     The above was completed in accordance with instructions and guidelines set forth by [facility].     Signed,    Garret Sanchez, JOE     Date:  04/21/2020

## 2020-04-24 ENCOUNTER — INFUSION (OUTPATIENT)
Dept: INFUSION THERAPY | Facility: HOSPITAL | Age: 75
End: 2020-04-24
Attending: INTERNAL MEDICINE
Payer: MEDICARE

## 2020-04-24 VITALS
OXYGEN SATURATION: 99 % | HEART RATE: 81 BPM | RESPIRATION RATE: 18 BRPM | SYSTOLIC BLOOD PRESSURE: 167 MMHG | DIASTOLIC BLOOD PRESSURE: 84 MMHG | TEMPERATURE: 98 F

## 2020-04-24 DIAGNOSIS — D50.9 IRON DEFICIENCY ANEMIA, UNSPECIFIED IRON DEFICIENCY ANEMIA TYPE: ICD-10-CM

## 2020-04-24 DIAGNOSIS — N18.9 ANEMIA IN CHRONIC KIDNEY DISEASE, UNSPECIFIED CKD STAGE: Primary | ICD-10-CM

## 2020-04-24 DIAGNOSIS — E78.2 COMBINED HYPERLIPIDEMIA ASSOCIATED WITH TYPE 2 DIABETES MELLITUS: Chronic | ICD-10-CM

## 2020-04-24 DIAGNOSIS — Z53.1 REFUSAL OF BLOOD TRANSFUSIONS AS PATIENT IS JEHOVAH'S WITNESS: ICD-10-CM

## 2020-04-24 DIAGNOSIS — D63.1 ANEMIA IN CHRONIC KIDNEY DISEASE, UNSPECIFIED CKD STAGE: Primary | ICD-10-CM

## 2020-04-24 DIAGNOSIS — E11.69 COMBINED HYPERLIPIDEMIA ASSOCIATED WITH TYPE 2 DIABETES MELLITUS: Chronic | ICD-10-CM

## 2020-04-24 PROCEDURE — 96372 THER/PROPH/DIAG INJ SC/IM: CPT | Mod: HCNC

## 2020-04-24 PROCEDURE — 63600175 PHARM REV CODE 636 W HCPCS: Mod: HCNC | Performed by: INTERNAL MEDICINE

## 2020-04-24 RX ORDER — ATORVASTATIN CALCIUM 20 MG/1
20 TABLET, FILM COATED ORAL DAILY
Qty: 90 TABLET | Refills: 3 | Status: SHIPPED | OUTPATIENT
Start: 2020-04-24 | End: 2021-02-04 | Stop reason: SDUPTHER

## 2020-04-24 RX ADMIN — EPOETIN ALFA-EPBX 20000 UNITS: 10000 INJECTION, SOLUTION INTRAVENOUS; SUBCUTANEOUS at 12:04

## 2020-04-24 NOTE — PLAN OF CARE
4/24 Hgb 10.4 Patient received RETAcrit 20,000 units. Tolerated injection well. VSS. Reports increased fatigue and generalized body aches. She received discharge instructions and follow up appointments. She will return in 2 weeks for routine labs and retacrit pending results. Patient verbalized understanding and discharged from unit via mobile scooter by self, in NAD.

## 2020-04-29 ENCOUNTER — OFFICE VISIT (OUTPATIENT)
Dept: HEMATOLOGY/ONCOLOGY | Facility: CLINIC | Age: 75
End: 2020-04-29
Payer: MEDICARE

## 2020-04-29 DIAGNOSIS — D63.1 ANEMIA IN CHRONIC KIDNEY DISEASE, UNSPECIFIED CKD STAGE: Primary | ICD-10-CM

## 2020-04-29 DIAGNOSIS — N18.9 CHRONIC KIDNEY DISEASE, UNSPECIFIED CKD STAGE: ICD-10-CM

## 2020-04-29 DIAGNOSIS — N18.9 ANEMIA IN CHRONIC KIDNEY DISEASE, UNSPECIFIED CKD STAGE: Primary | ICD-10-CM

## 2020-04-29 DIAGNOSIS — G89.4 CHRONIC PAIN SYNDROME: ICD-10-CM

## 2020-04-29 PROCEDURE — 99442 PR PHYSICIAN TELEPHONE EVALUATION 11-20 MIN: ICD-10-PCS | Mod: HCNC,95,, | Performed by: INTERNAL MEDICINE

## 2020-04-29 PROCEDURE — 99442 PR PHYSICIAN TELEPHONE EVALUATION 11-20 MIN: CPT | Mod: HCNC,95,, | Performed by: INTERNAL MEDICINE

## 2020-04-29 NOTE — PROGRESS NOTES
Subjective:     Established Patient - Audio Only Telehealth Visit     The patient location is: Home  The chief complaint leading to consultation is: Follow-up JOLLY  Visit type: Virtual visit with audio only (telephone)  Visit time: 15 min     The reason for the audio only service rather than synchronous audio and video virtual visit was related to technical difficulties or patient preference/necessity.     Each patient to whom I provide medical services by telemedicine is:  (1) informed of the relationship between the physician and patient and the respective role of any other health care provider with respect to management of the patient; and (2) notified that they may decline to receive medical services by telemedicine and may withdraw from such care at any time. Patient verbally consented to receive this service via voice-only telephone call.    This service was not originating from a related E/M service provided within the previous 7 days nor will  to an E/M service or procedure within the next 24 hours or my soonest available appointment.  Prevailing standard of care was able to be met in this audio-only visit.         Patient ID: Regino Lawrence is a 74 y.o. female.    Chief Complaint: Follow-up anemia      Diagnosis: Anemia in CKD  Patient is a [employer]  .    The patient is seen today via televisit for chronic anemia in CKD. The patient reports that she has been diagnosed with JOLLY in the past.  She has been on oral iron supplementation therapy, but could not tolerate or did not respond, she is uncertain.  She is followed by GI and has undergone a colonoscopy earlier this year and was diagnosed with hemorrhoids for which she underwent banding procedure.  No melena, hematochezia,change in bowel habits.  She has also been diagnosed with B12 deficiency in the past, but reports she did not respond to B12 injections. No history of blood transfusions.  She is a [employer].  She reports that  she remembers getting injections in the 1970s when her blood count was low.        She is followed by Rheumatology for history of sarcoidosis with associated myopathy and arthropathy.   .She has been treated in the  past with methotrexate and Plaquenil, both of which were ineffective  Cellcept and imuran caused some unknown side effect.   Colchicine  held due to low WBC     Today, no new issues  She continues with Chronic diffuse arthralgias-stable   No fevers  No cough/sob    She is undergoing epo inj q 2wks  Hb 10.4g/dL      She was  hospitalized 10/18/2019  She presented with SOB and dyspnea on exertion for the last 2 months.The CTA was negative for PE. She has also seen a cardiologist. Her echo/stress test in 2019 were with EF of 55% and no ischemia. She denies any leg swelling associated with this. She was treated for a possible  Sarcoidosis flare-up      She also has  undergone intermittent IV iron therapy    She is followed by pain mgmt for chronic hip pain   She was diagnosed with bursitis of hip and  underwent steroid inj  history of cervical spinal stenosis s/p posterior C3-C7 laminectomy and fusion on 11/16/15 by Dr. Conner.      CBC 20  reveals wbc 7240/mm3  Hb 10.4 g/dl Hct 33.1% Plt ct 250k      Patient was in the ED 2018 and was seen on   at [facility] for back issues  She is followed by Neurosurgery for cervical spinal stenosis s/p posterior C3-C7 laminectomy and fusion      She is followed by PCP for DM  DM well -controlled  Hba1c  6.2%       PAST MEDICAL HISTORY:  Acid reflux, alopecia, anemia, anxiety disorder, chronic  kidney disease, depression, diabetes mellitus type 2, hyperlipidemia,  hypertension, hypothyroidism, osteoporosis, sarcoidosis.    PAST SURGICAL HISTORY:  Cholecystectomy, , tubal ligation, carpal tunnel release, cataracts.    FAMILY HISTORY: Unremarkable for cancer. Significant for HTN.       Review of Systems   Constitutional: Negative for activity change,  appetite change and fatigue.   HENT: Negative for hearing loss and nosebleeds.    Eyes: Negative for visual disturbance.   Respiratory: Negative for cough and shortness of breath.    Cardiovascular: Negative for chest pain and leg swelling.   Gastrointestinal: Negative for abdominal pain, constipation, diarrhea and nausea.   Genitourinary: Negative for flank pain and urgency.   Musculoskeletal: Positive for arthralgias and back pain. Negative for gait problem and joint swelling.   Skin: Negative for rash.        No petechiae, ecchymoses   Neurological: Negative for light-headedness and headaches.   Hematological: Negative for adenopathy. Does not bruise/bleed easily.       Objective:       .Televisit  PE deferred            Lab Results   Component Value Date    WBC 7.24 04/24/2020    HGB 10.4 (L) 04/24/2020    HCT 33.1 (L) 04/24/2020     (H) 04/24/2020     04/24/2020     Lab Results   Component Value Date    IRON 85 04/24/2020    TIBC 400 04/24/2020    FERRITIN 297 04/24/2020     SPEP-nl      CT renal 3/6/2017   IMPRESSION:  1.  No renal, ureteral or bladder calculi.  No hydronephrosis or ureterectasis.  2.  Poorly distended bladder.  Mild bladder wall prominence.  Mild cystitis cannot be   excluded.  3.  Moderate constipation.  Normal appendix      Lab Results   Component Value Date    IRON 85 04/24/2020    TIBC 400 04/24/2020    FERRITIN 297 04/24/2020     Lab Results   Component Value Date    WBC 7.24 04/24/2020    HGB 10.4 (L) 04/24/2020    HCT 33.1 (L) 04/24/2020     (H) 04/24/2020     04/24/2020         Assessment:       1. Anemia in chronic kidney disease, unspecified CKD stage    2. Chronic kidney disease, unspecified CKD stage    3. Patient is Religion    4. Chronic pain syndrome        Plan:   1-4   Pt clinically stable  Pt is a [employer] and declines/not interested in blood and blood products due to Scientologist beliefs    CBC 4/24/20  reveals wbc 7240/mm3  Hb  10.4 g/dl Hct 33.1% Plt ct 250k  Ferritin 297  Fe sats 21  Cont t EPO inj  20,000u q 2wk ( per lab parameters)  Pt followed by Nephrology . It has been determined early CKD stage III, suspect due to age-related renal nephron loss, along with possible diabetic nephropathy v. hypertensive nephrosclerosis    CBC q2wks STANDING  Cont EPO  inj q 2wks( pending lab parameters)  Hb responding    Continue periodic monitoring of Fe studies    DM well -controlled  Hba1c  6.2%     Follow-up with PCP for med mgmt    F/u 2 mos with Fe studies    Advance Care Planning     Power of   I previously initiated the process of advance care planning today and explained the importance of this process to the patient.  I introduced the concept of advance directives to the patient, as well. Then the patient received detailed information about the importance of designating a Health Care Power of  (HCPOA). She was also instructed to communicate with this person about their wishes for future healthcare, should she become sick and lose decision-making capacity. The patient has  previously appointed a HCPOA. Pt completed in 2015           CC: Micaela Mendoza M.D.

## 2020-05-01 DIAGNOSIS — M79.10 MYALGIA: ICD-10-CM

## 2020-05-01 RX ORDER — TIZANIDINE 2 MG/1
4 TABLET ORAL EVERY 8 HOURS PRN
Qty: 270 TABLET | Refills: 0 | Status: SHIPPED | OUTPATIENT
Start: 2020-05-01 | End: 2020-07-29

## 2020-05-08 ENCOUNTER — INFUSION (OUTPATIENT)
Dept: INFUSION THERAPY | Facility: HOSPITAL | Age: 75
End: 2020-05-08
Attending: INTERNAL MEDICINE
Payer: MEDICARE

## 2020-05-08 VITALS
TEMPERATURE: 98 F | HEART RATE: 84 BPM | RESPIRATION RATE: 17 BRPM | SYSTOLIC BLOOD PRESSURE: 121 MMHG | OXYGEN SATURATION: 98 % | DIASTOLIC BLOOD PRESSURE: 69 MMHG

## 2020-05-08 DIAGNOSIS — Z53.1 REFUSAL OF BLOOD TRANSFUSIONS AS PATIENT IS JEHOVAH'S WITNESS: ICD-10-CM

## 2020-05-08 DIAGNOSIS — D63.1 ANEMIA IN CHRONIC KIDNEY DISEASE, UNSPECIFIED CKD STAGE: Primary | ICD-10-CM

## 2020-05-08 DIAGNOSIS — D50.9 IRON DEFICIENCY ANEMIA, UNSPECIFIED IRON DEFICIENCY ANEMIA TYPE: ICD-10-CM

## 2020-05-08 DIAGNOSIS — N18.9 ANEMIA IN CHRONIC KIDNEY DISEASE, UNSPECIFIED CKD STAGE: Primary | ICD-10-CM

## 2020-05-08 PROCEDURE — 96372 THER/PROPH/DIAG INJ SC/IM: CPT | Mod: HCNC

## 2020-05-08 PROCEDURE — 63600175 PHARM REV CODE 636 W HCPCS: Mod: HCNC | Performed by: INTERNAL MEDICINE

## 2020-05-08 RX ADMIN — EPOETIN ALFA-EPBX 20000 UNITS: 10000 INJECTION, SOLUTION INTRAVENOUS; SUBCUTANEOUS at 11:05

## 2020-05-08 NOTE — PLAN OF CARE
Patient arrived to unit for q2 week Retacrit injection. Plan of care reviewed, patient agreeable to plan. Patient tolerated injection well. VSS. Discharge instructions reviewed, patient instructed to return 5/22/20 for lab and next injection. Patient exited unit via mobility scooter, in NAD at time of discharge.

## 2020-05-13 ENCOUNTER — PATIENT OUTREACH (OUTPATIENT)
Dept: ADMINISTRATIVE | Facility: HOSPITAL | Age: 75
End: 2020-05-13

## 2020-05-18 ENCOUNTER — TELEPHONE (OUTPATIENT)
Dept: HEMATOLOGY/ONCOLOGY | Facility: CLINIC | Age: 75
End: 2020-05-18

## 2020-05-18 DIAGNOSIS — N18.9 ANEMIA IN CHRONIC KIDNEY DISEASE, UNSPECIFIED CKD STAGE: Primary | ICD-10-CM

## 2020-05-18 DIAGNOSIS — D63.1 ANEMIA IN CHRONIC KIDNEY DISEASE, UNSPECIFIED CKD STAGE: Primary | ICD-10-CM

## 2020-05-19 ENCOUNTER — LAB VISIT (OUTPATIENT)
Dept: FAMILY MEDICINE | Facility: CLINIC | Age: 75
End: 2020-05-19
Payer: MEDICARE

## 2020-05-19 DIAGNOSIS — N18.9 ANEMIA IN CHRONIC KIDNEY DISEASE, UNSPECIFIED CKD STAGE: ICD-10-CM

## 2020-05-19 DIAGNOSIS — D63.1 ANEMIA IN CHRONIC KIDNEY DISEASE, UNSPECIFIED CKD STAGE: ICD-10-CM

## 2020-05-19 PROCEDURE — U0003 INFECTIOUS AGENT DETECTION BY NUCLEIC ACID (DNA OR RNA); SEVERE ACUTE RESPIRATORY SYNDROME CORONAVIRUS 2 (SARS-COV-2) (CORONAVIRUS DISEASE [COVID-19]), AMPLIFIED PROBE TECHNIQUE, MAKING USE OF HIGH THROUGHPUT TECHNOLOGIES AS DESCRIBED BY CMS-2020-01-R: HCPCS | Mod: HCNC

## 2020-05-20 LAB — SARS-COV-2 RNA RESP QL NAA+PROBE: NOT DETECTED

## 2020-05-22 ENCOUNTER — INFUSION (OUTPATIENT)
Dept: INFUSION THERAPY | Facility: HOSPITAL | Age: 75
End: 2020-05-22
Attending: INTERNAL MEDICINE
Payer: MEDICARE

## 2020-05-22 ENCOUNTER — PATIENT MESSAGE (OUTPATIENT)
Dept: RHEUMATOLOGY | Facility: CLINIC | Age: 75
End: 2020-05-22

## 2020-05-22 ENCOUNTER — PATIENT OUTREACH (OUTPATIENT)
Dept: ADMINISTRATIVE | Facility: OTHER | Age: 75
End: 2020-05-22

## 2020-05-22 VITALS
RESPIRATION RATE: 17 BRPM | OXYGEN SATURATION: 98 % | DIASTOLIC BLOOD PRESSURE: 85 MMHG | SYSTOLIC BLOOD PRESSURE: 158 MMHG | TEMPERATURE: 98 F | HEART RATE: 73 BPM

## 2020-05-22 DIAGNOSIS — D63.1 ANEMIA IN CHRONIC KIDNEY DISEASE, UNSPECIFIED CKD STAGE: Primary | ICD-10-CM

## 2020-05-22 DIAGNOSIS — Z53.1 REFUSAL OF BLOOD TRANSFUSIONS AS PATIENT IS JEHOVAH'S WITNESS: ICD-10-CM

## 2020-05-22 DIAGNOSIS — N18.9 ANEMIA IN CHRONIC KIDNEY DISEASE, UNSPECIFIED CKD STAGE: Primary | ICD-10-CM

## 2020-05-22 DIAGNOSIS — D50.9 IRON DEFICIENCY ANEMIA, UNSPECIFIED IRON DEFICIENCY ANEMIA TYPE: ICD-10-CM

## 2020-05-22 PROCEDURE — 63600175 PHARM REV CODE 636 W HCPCS: Mod: HCNC | Performed by: INTERNAL MEDICINE

## 2020-05-22 PROCEDURE — 96372 THER/PROPH/DIAG INJ SC/IM: CPT | Mod: HCNC

## 2020-05-22 RX ADMIN — EPOETIN ALFA-EPBX 20000 UNITS: 10000 INJECTION, SOLUTION INTRAVENOUS; SUBCUTANEOUS at 11:05

## 2020-05-22 NOTE — PROGRESS NOTES
Subjective:      Patient ID: Regino Lawrence is a 74 y.o. female.    Chief Complaint: Neck Pain and Low-back Pain    Ms Lawrence is a 75 yo female here for follow up of low back pain and left hip pain.  s/p posterior C3-C7 laminectomy and fusion on 11/16/2015.  She was last seen by me on 1/31/20 and we did a left trochanteric bursa.  She has had low back for years and was having more right hip pain at last visit she was going to try PT and consider other injections.  She was sent to pain management for left hip joint injection done on 3/21/2019 and then again 7/21/2019.  She also had left hip and GTB injection 9/12/2019.  She feels like the recent injection was very helpful.  It helped left hip. She had Bilateral L5 transforaminal epidural steroid injection 12/5/2019.  Today, she is having pain all over.  She has been having pain all over.  She has hip pain, back pain and neck pain.   She feels like the hop and back pain is the worst.  She has pain with walking.  The pain is on the left side.  She feels like the pain is all on the left.  It is lower back and rotates to the left hip.  She feels like she cannot use hands.  She is having diarrhea, and feels like she is weak.  She feels like she is having it daily, but it is not all day everyday.  She sees PCP on Wednesday.  She has also been SOB.  She ahs not made it to PT.  She has been taking the tizanidine and it helps.  She has pain pills from PCP.  Legs are swelling and her feet.  She feels like pain is all over.  The pain is down the left leg to the foot.      Interventional Therapies:   10/8/18 - left L5 transforaminal epidural steroid injection - good relief of back pain no relief of left lower extremity symptoms  12/6/18 - bilateral L5 transforaminal epidural steroid injection - good relief of back pain no relief of left lower extremity symptoms  3/21/2019 left hip injection  7/21/2019 left hip joint injection  9/12/2019 left hip and GTB injection 100%  12/5/2019  bilateral L5 TF RHIANNA  1/31/20 left trochanteric bursa  3/17/2020 left trochanteric bursa    CT cervical 2018  There is postsurgical change of posterior spinal fusion and laminectomies from C3 to C7 with bilateral lateral mass screws, stabilization rods, and bone material.  No surrounding lucency to suggest loosening.  No hardware fracture or other evidence of hardware failure.    There is straightening of the normal cervical lordosis.  The vertebral body heights appear relatively well-maintained.  There is no evidence of fracture or osseous lytic or blastic process.  There is intervertebral disc space height loss, most significant at C5-C6 and C6-C7. Focal degenerative changes are described below.    C2 -- C3: Posterior disc osteophyte complex and facet arthropathy without significant spinal canal stenosis or neuroforaminal narrowing.    C3 -- C4: Postsurgical change with posterior disc osteophyte complex without significant spinal canal stenosis or neuroforaminal narrowing.    C4 -- C5: Postsurgical change with posterior disc osteophyte complex, mild uncovertebral spurring, and facet arthropathy resulting in mild left neuroforaminal narrowing.  No significant spinal canal stenosis.    C5 -- C6: Postsurgical change with posterior disc osteophyte complex, facet arthropathy, and uncovertebral spurring resulting in mild right and severe left neuroforaminal narrowing.  No significant spinal canal stenosis.    C6 -- C7: Postsurgical change with posterior disc osteophyte complex, uncovertebral spurring, and facet arthropathy resulting in mild bilateral neuroforaminal narrowing.  No significant spinal canal stenosis.    C7 -- T1: No significant disc abnormality, spinal canal stenosis, or neuroforaminal narrowing.    The spinal canal otherwise appears unremarkable, noting evaluation at postsurgical levels is somewhat limited due to metallic artifact.  No intradural abnormalities are identified.  Evaluation of the surrounding  soft tissues reveals unchanged appearance of a small osseous body within the left posterior neck.  A 0.9 x 0.7 cm soft tissue opacity within the posterior left parotid gland.  This focus has slightly enlarged since CT 03/23/2016.  There is biapical pulmonary scarring.    Impression      Postsurgical change of C3-C7 laminectomy with posterior instrumented spinal fusion, unchanged.    Cervical spondylosis, most significant at C5-C6 with severe left and mild right neural foraminal narrowing at this level.  Additional multilevel neural foraminal narrowing as detailed above.  No significant spinal canal stenosis at any level.    0.9 cm left parotid soft tissue opacity, slightly enlarged since CT 03/23/2016 and possibly correlating with a prominent intraparotid lymph node or adenoma.  Further evaluation is recommended with dedicated parotid ultrasound.    This report was flagged in Epic as abnormal.      MRI lumbar 9/2018  The distal cord/conus demonstrates normal size and signal.  No evidence of osteomyelitis, marrow replacement process, or acute fracture.  No paraspinal masses.    At L1-2, there is asymmetric disc bulging to the right, resulting in mild right-sided neural foraminal narrowing.  No spinal canal stenosis.    L1-2 is unremarkable.    L2-3 demonstrates mild disc bulging slightly asymmetric to the left resulting in mild left-sided neural foraminal narrowing.  No spinal canal stenosis.    L4-5 demonstrates minimal anterolisthesis anterolisthesis.  There is mild facet arthropathy and disc bulging.  This results in mild left-sided neural foraminal narrowing.  No spinal canal stenosis.    At L5-S1, there is a moderate sized left lateral recess/foraminal disc extrusion effacing the left neural foramen, likely impinging upon the exiting left L5 nerve root.  There is left-sided degenerative disc disease, noting disc height loss and sub endplate marrow edema.  There is moderate bilateral facet arthropathy.  No spinal  canal stenosis.    Impression      Moderate size left foraminal disc extrusion at L5-S1, likely impinging upon the exiting left L5 nerve root.    Additional lumbar spondylosis, resulting in mild neural foraminal narrowing at L1-2, L2-3, and L4-5, as above.  No spinal canal stenosis.    X-ray hip 1/2019  No evidence for acute fracture, dislocation or destructive process.  Marginal osteophytes noted bilaterally, slightly more pronounced on the left.  Joint spaces appear maintained.  Degenerative changes of the lumbar spine are evident.  SI joints and sacrum demonstrate no acute finding.  Moderate amount of stool is seen throughout the colon.  Surrounding soft tissues appear unremarkable.    Ct cervical 7/2018  There is postsurgical change of posterior spinal fusion and laminectomies from C3 to C7 with bilateral lateral mass screws, stabilization rods, and bone material.  No surrounding lucency to suggest loosening.  No hardware fracture or other evidence of hardware failure.    There is straightening of the normal cervical lordosis.  The vertebral body heights appear relatively well-maintained.  There is no evidence of fracture or osseous lytic or blastic process.  There is intervertebral disc space height loss, most significant at C5-C6 and C6-C7. Focal degenerative changes are described below.    C2 -- C3: Posterior disc osteophyte complex and facet arthropathy without significant spinal canal stenosis or neuroforaminal narrowing.    C3 -- C4: Postsurgical change with posterior disc osteophyte complex without significant spinal canal stenosis or neuroforaminal narrowing.    C4 -- C5: Postsurgical change with posterior disc osteophyte complex, mild uncovertebral spurring, and facet arthropathy resulting in mild left neuroforaminal narrowing.  No significant spinal canal stenosis.    C5 -- C6: Postsurgical change with posterior disc osteophyte complex, facet arthropathy, and uncovertebral spurring resulting in mild  right and severe left neuroforaminal narrowing.  No significant spinal canal stenosis.    C6 -- C7: Postsurgical change with posterior disc osteophyte complex, uncovertebral spurring, and facet arthropathy resulting in mild bilateral neuroforaminal narrowing.  No significant spinal canal stenosis.    C7 -- T1: No significant disc abnormality, spinal canal stenosis, or neuroforaminal narrowing.    The spinal canal otherwise appears unremarkable, noting evaluation at postsurgical levels is somewhat limited due to metallic artifact.  No intradural abnormalities are identified.  Evaluation of the surrounding soft tissues reveals unchanged appearance of a small osseous body within the left posterior neck.  A 0.9 x 0.7 cm soft tissue opacity within the posterior left parotid gland.  This focus has slightly enlarged since CT 03/23/2016.  There is biapical pulmonary scarring.    Impression      Postsurgical change of C3-C7 laminectomy with posterior instrumented spinal fusion, unchanged.    Cervical spondylosis, most significant at C5-C6 with severe left and mild right neural foraminal narrowing at this level.  Additional multilevel neural foraminal narrowing as detailed above.  No significant spinal canal stenosis at any level.    0.9 cm left parotid soft tissue opacity, slightly enlarged since CT 03/23/2016 and possibly correlating with a prominent intraparotid lymph node or adenoma.  Further evaluation is recommended with dedicated parotid ultrasound.      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, without   long-term current use of insulin  No date: Depression  No date: Diabetes mellitus, type 2  as a child : Eye injury       Comment:  k-abrasion  od  No date: Hyperlipidemia  No date: Hypertension  No date: Hypothyroidism  No date: Immune deficiency disorder  No date: Immune disorder  2012: Myalgia and myositis  No date: Osteoporosis  No date: Polyneuropathy  No date: Renal manifestation of secondary diabetes mellitus  No date: Sarcoidosis  No date: Ulcer      Comment:  no cancer  No date: Urinary incontinence    Past Surgical History:  No date: CARPAL TUNNEL RELEASE      Comment:  Rt wrist  2015: CATARACT EXTRACTION W/  INTRAOCULAR LENS IMPLANT; Right      Comment:  Dr. Azevedo  2015: CATARACT EXTRACTION W/  INTRAOCULAR LENS IMPLANT; Left      Comment:  Dr. Azevedo  No date:  SECTION  No date: CHOLECYSTECTOMY  2016: COLPOCLEISIS-- [place]; N/A      Comment:  Performed by Meredith Becker DO at [facility] OR  2016: CYSTOSCOPY; N/A      Comment:  Performed by Meredith Becker DO at [facility] OR  2018: Injection,steroid,epidural,transforaminal approach      Bilateral L5; Bilateral      Comment:  Performed by Alfonso Richards MD at [facility] PAIN T  10/8/2018: Injection,steroid,epidural,transforaminal approach  Left   L5-S1; Left      Comment[facility]  [facility]: INSERTION-INTRAOCULAR LENS (IOL); Left      Comment:  Performed by Elio Azevedo MD at [facility] OR  2015: INSERTION-INTRAOCULAR LENS (IOL); Right      Comment:  Performed by Elio Azevedo MD at [facility] OR  2015: LAMINECTOMY-CERVICAL/FUSION-POSTERIOR C3-C7; N/A      Comment:  Performed by Fab Conner MD at [facility] OR 2ND FLR  2015: PHACOEMULSIFICATION-ASPIRATION-CATARACT; Left      Comment:  Performed by Elio Azevedo MD at [facility] OR  2015: PHACOEMULSIFICATION-ASPIRATION-CATARACT; Right      Comment:  Performed by Elio Azevedo MD at [facility] OR  2016: REPAIR-POSTERIOR; N/A      Comment:  Performed by Meredith Becker DO at [facility] OR  2016:  SLING-TRANSOBTERATOR TAPE; N/A      Comment:  Performed by Meredith Becker DO at [facility] OR  No date: TUBAL LIGATION    Review of patient's family history indicates:  Problem: Hypertension      Relation: Mother          Age of Onset: (Not Specified)  Problem: Cataracts      Relation: Mother          Age of Onset: (Not Specified)  Problem: No Known Problems      Relation: Father          Age of Onset: (Not Specified)  Problem: Hypertension      Relation: Maternal Grandmother          Age of Onset: (Not Specified)  Problem: Glaucoma      Relation: Sister          Age of Onset: (Not Specified)  Problem: Arthritis      Relation: Sister          Age of Onset: (Not Specified)  Problem: No Known Problems      Relation: Brother          Age of Onset: (Not Specified)  Problem: No Known Problems      Relation: Maternal Aunt          Age of Onset: (Not Specified)  Problem: No Known Problems      Relation: Maternal Uncle          Age of Onset: (Not Specified)  Problem: No Known Problems      Relation: Paternal Aunt          Age of Onset: (Not Specified)  Problem: No Known Problems      Relation: Paternal Uncle          Age of Onset: (Not Specified)  Problem: No Known Problems      Relation: Maternal Grandfather          Age of Onset: (Not Specified)  Problem: No Known Problems      Relation: Paternal Grandmother          Age of Onset: (Not Specified)  Problem: No Known Problems      Relation: Paternal Grandfather          Age of Onset: (Not Specified)  Problem: Kidney failure      Relation: Sister          Age of Onset: (Not Specified)  Problem: Hepatitis      Relation: Sister          Age of Onset: (Not Specified)  Problem: Cancer      Relation: Sister          Age of Onset: (Not Specified)          Comment: bone cancer   Problem: Immunodeficiency      Relation: Sister          Age of Onset: (Not Specified)  Problem: Lupus      Relation: Neg Hx          Age of Onset: (Not Specified)  Problem: Rheum arthritis      Relation:  Neg Hx          Age of Onset: (Not Specified)  Problem: Amblyopia      Relation: Neg Hx          Age of Onset: (Not Specified)  Problem: Blindness      Relation: Neg Hx          Age of Onset: (Not Specified)  Problem: Diabetes      Relation: Neg Hx          Age of Onset: (Not Specified)  Problem: Macular degeneration      Relation: Neg Hx          Age of Onset: (Not Specified)  Problem: Retinal detachment      Relation: Neg Hx          Age of Onset: (Not Specified)  Problem: Strabismus      Relation: Neg Hx          Age of Onset: (Not Specified)  Problem: Stroke      Relation: Neg Hx          Age of Onset: (Not Specified)  Problem: Thyroid disease      Relation: Neg Hx          Age of Onset: (Not Specified)  Problem: Endometrial cancer      Relation: Neg Hx          Age of Onset: (Not Specified)  Problem: Vaginal cancer      Relation: Neg Hx          Age of Onset: (Not Specified)  Problem: Cervical cancer      Relation: Neg Hx          Age of Onset: (Not Specified)      Social History    Socioeconomic History      Marital status:       Spouse name: Not on file      Number of children: Not on file      Years of education: Not on file      Highest education level: Not on file    Social Needs      Financial resource strain: Not on file      Food insecurity - worry: Not on file      Food insecurity - inability: Not on file      Transportation needs - medical: Not on file      Transportation needs - non-medical: Not on file    Occupational History      Not on file    Tobacco Use      Smoking status: Never Smoker      Smokeless tobacco: Never Used    Substance and Sexual Activity      Alcohol use: No      Drug use: No      Sexual activity: Yes        Partners: Male    Other Topics      Concerns:        Not on file    Social History Narrative      Not on file      Current Outpatient Medications:  ACCU-CHEK FASTCLIX LANCING DEV Kit, USE AS DIRECTED., Disp: 1 each, Rfl: 0  ACCU-CHEK SMARTVIEW TEST STRIP Strp, TEST   ONCE  A  DAY, Disp: 100 strip, Rfl: 11  ALCOHOL ANTISEPTIC PADS (ALCOHOL PREP PADS TOP), , Disp: , Rfl:   amLODIPine (NORVASC) 10 MG tablet, Take 1 tablet (10 mg total) by mouth once daily., Disp: 90 tablet, Rfl: 1  blood-glucose meter kit, Use as instructed, Disp: 1 each, Rfl: 0  calcium carbonate (OS-MALCOLM) 600 mg calcium (1,500 mg) Tab, Take 1 tablet by mouth 2 (two) times daily. , Disp: , Rfl:   carvedilol (COREG) 25 MG tablet, Take 1 tablet (25 mg total) by mouth 2 (two) times daily., Disp: 180 tablet, Rfl: 3  cloNIDine (CATAPRES) 0.3 MG tablet, Take 1 tablet (0.3 mg total) by mouth 3 (three) times daily., Disp: 270 tablet, Rfl: 1  epoetin german (PROCRIT INJ), Inject 1,000 Units as directed., Disp: , Rfl:   fenofibrate 160 MG Tab, Take 1 tablet (160 mg total) by mouth once daily., Disp: 90 tablet, Rfl: 1  fexofenadine (ALLEGRA ALLERGY) 180 MG tablet, Take 180 mg by mouth daily as needed. , Disp: , Rfl:   folic acid (FOLVITE) 1 MG tablet, Take 1 tablet (1 mg total) by mouth once daily., Disp: 90 tablet, Rfl: 0  gabapentin (NEURONTIN) 400 MG capsule, Take 1 capsule (400 mg total) by mouth 2 (two) times daily., Disp: 180 capsule, Rfl: 1  HYDROcodone-acetaminophen (NORCO) 5-325 mg per tablet, Take 1 tablet by mouth every 6 (six) hours as needed., Disp: 90 tablet, Rfl: 0  levothyroxine (SYNTHROID) 50 MCG tablet, TAKE 1 TABLET BY MOUTH ONCE DAILY BEFORE BREAKFAST, Disp: 90 tablet, Rfl: 1  metFORMIN (GLUCOPHAGE) 1000 MG tablet, Take 1 tablet (1,000 mg total) by mouth 2 (two) times daily with meals., Disp: 180 tablet, Rfl: 1  methotrexate 2.5 MG Tab, TAKE 6 TABLETS BY MOUTH EVERY 7 DAYS, Disp: 72 tablet, Rfl: 0  predniSONE (DELTASONE) 10 MG tablet, , Disp: , Rfl:     No current facility-administered medications for this visit.       Review of patient's allergies indicates:  No Known Allergies          Review of Systems   Constitution: Negative for weight gain and weight loss.   Cardiovascular: Negative for chest pain.    Respiratory: Positive for shortness of breath.    Musculoskeletal: Positive for back pain (left leg) and joint pain (left foot). Negative for joint swelling.   Gastrointestinal: Positive for nausea. Negative for abdominal pain, bowel incontinence and vomiting.   Genitourinary: Negative for bladder incontinence.   Neurological: Negative for numbness and paresthesias.         Objective:        General: Regino is well-developed, well-nourished, appears stated age, in no acute distress, alert and oriented to time, place and person.     General    Vitals reviewed.  Constitutional: She is oriented to person, place, and time. She appears well-developed and well-nourished.   HENT:   Head: Normocephalic and atraumatic.   Pulmonary/Chest: Effort normal.   Neurological: She is alert and oriented to person, place, and time.   Psychiatric: She has a normal mood and affect. Her behavior is normal. Judgment and thought content normal.         Left Hip Exam     Tenderness  Also left side trochanteric tenderness.    Range of Motion   Extension: -10   External rotation: 40   Internal rotation: 10       Back (L-Spine & T-Spine) / Neck (C-Spine) Exam     Tenderness   The patient is tender to palpation of the left side trochanteric.     Back (L-Spine & T-Spine) Range of Motion   Extension: 0   Flexion: 70   Lateral bend right: 10   Lateral bend left: 10   Rotation right: 30   Rotation left: 30     Spinal Sensation   Right Side Sensation  C-Spine Level: normal   L-Spine Level: normal  S-Spine Level: normal  Left Side Sensation  C-Spine Level: normal  L-Spine Level: normal  S-Spine Level: normal    Other She has no scoliosis .  Spinal Kyphosis:  Absent      Muscle Strength   Right Upper Extremity   Biceps: 5/5/5   Deltoid:  5/5  Triceps:  5/5  Wrist extension: 5/5/5   Finger Flexors:  5/5  Left Upper Extremity  Biceps: 5/5/5   Deltoid:  5/5  Triceps:  5/5  Wrist extension: 5/5/5   Finger Flexors:  5/5  Right Lower Extremity   Hip  Flexion: 5/5   Quadriceps:  5/5   Anterior tibial:  5/5/5  EHL:  5/5  Left Lower Extremity   Hip Flexion: 5/5   Quadriceps:  5/5   Anterior tibial:  5/5/5   EHL:  5/5    Reflexes     Left Side  Biceps:  2+  Triceps:  2+  Brachioradialis:  2+  Quadriceps:  2+  Achilles:  2+  Left Silverman's Sign:  Absent  Babinski Sign:  absent    Right Side   Biceps:  2+  Triceps:  2+  Brachioradialis:  2+  Quadriceps:  2+  Achilles:  2+  Right Silverman's Sign:  absent  Babinski Sign:  absent    Vascular Exam     Right Pulses        Carotid:                  2+    Left Pulses        Carotid:                  2+              Assessment:       1. Greater trochanteric bursitis of left hip    2. Left hip pain    3. DDD (degenerative disc disease), lumbar    4. Trochanteric bursitis of left hip    5. Right hip pain    6. Chronic bilateral low back pain with left-sided sciatica           Plan:       Orders Placed This Encounter    Procedure Order to Pain Management     1.   Left hip joint injection have been helpful   She got 100% worth of relief.  She does have some degenerative changes in her hip  2.  She has back pain and DDD and she has had injections in her back which help back pain but not the left leg pain.  The injection in her hip helped the left leg pain.  I think the leg pain is coming from hip.  We discussed going back to ortho since the hip joint injection helps.  We could consider a repeat MRI of the lumbar spine.   3.  Pain is very diffuse today.  She is jumping from place to place.  When stands and tries to walk it is left back and down left leg.  We will order repeat L5 TF RHIANNA  4.  Appointment with Dr. Richards to discuss injections  5. PT for back and core strengthening, ITB stretches and glute strengthening and HEP she has not been able to go.  I encourage her to stay active and keep moving.  She says she does and then she also says she doesn't.    6. I think therapy will be helpful, but does not go  7. She is taking  tizanidine and gabapentin per other providers  8.  RTC 4 months          Follow-up: No follow-ups on file. If there are any questions prior to this, the patient was instructed to contact the office.

## 2020-05-22 NOTE — PROGRESS NOTES
Chart reviewed.   Immunizations: Triggered Imm Registry     Orders placed: n/a  Upcoming appts to satisfy MALIA topics: n/a

## 2020-05-22 NOTE — PLAN OF CARE
Patient arrived to unit for Retacrit injection. Plan of care reviewed, patient agreeable to plan. Patient tolerated injection well. VSS. Discharge instructions reviewed, patient instructed to return 6/5/20. Patient exited unit via mobility scooter. Patient in NAD at time of discharge.

## 2020-05-25 ENCOUNTER — OFFICE VISIT (OUTPATIENT)
Dept: SPINE | Facility: CLINIC | Age: 75
End: 2020-05-25
Attending: PHYSICAL MEDICINE & REHABILITATION
Payer: MEDICARE

## 2020-05-25 VITALS
WEIGHT: 124 LBS | BODY MASS INDEX: 23.41 KG/M2 | HEART RATE: 70 BPM | DIASTOLIC BLOOD PRESSURE: 72 MMHG | HEIGHT: 61 IN | SYSTOLIC BLOOD PRESSURE: 150 MMHG

## 2020-05-25 DIAGNOSIS — M70.62 GREATER TROCHANTERIC BURSITIS OF LEFT HIP: Primary | ICD-10-CM

## 2020-05-25 DIAGNOSIS — M51.36 DDD (DEGENERATIVE DISC DISEASE), LUMBAR: ICD-10-CM

## 2020-05-25 DIAGNOSIS — G89.29 CHRONIC BILATERAL LOW BACK PAIN WITH LEFT-SIDED SCIATICA: ICD-10-CM

## 2020-05-25 DIAGNOSIS — M25.552 LEFT HIP PAIN: ICD-10-CM

## 2020-05-25 DIAGNOSIS — M70.62 TROCHANTERIC BURSITIS OF LEFT HIP: ICD-10-CM

## 2020-05-25 DIAGNOSIS — M25.551 RIGHT HIP PAIN: ICD-10-CM

## 2020-05-25 DIAGNOSIS — M54.42 CHRONIC BILATERAL LOW BACK PAIN WITH LEFT-SIDED SCIATICA: ICD-10-CM

## 2020-05-25 PROCEDURE — 99214 OFFICE O/P EST MOD 30 MIN: CPT | Mod: HCNC,S$GLB,, | Performed by: PHYSICAL MEDICINE & REHABILITATION

## 2020-05-25 PROCEDURE — 3077F SYST BP >= 140 MM HG: CPT | Mod: HCNC,CPTII,S$GLB, | Performed by: PHYSICAL MEDICINE & REHABILITATION

## 2020-05-25 PROCEDURE — 1101F PT FALLS ASSESS-DOCD LE1/YR: CPT | Mod: HCNC,CPTII,S$GLB, | Performed by: PHYSICAL MEDICINE & REHABILITATION

## 2020-05-25 PROCEDURE — 3077F PR MOST RECENT SYSTOLIC BLOOD PRESSURE >= 140 MM HG: ICD-10-PCS | Mod: HCNC,CPTII,S$GLB, | Performed by: PHYSICAL MEDICINE & REHABILITATION

## 2020-05-25 PROCEDURE — 3078F PR MOST RECENT DIASTOLIC BLOOD PRESSURE < 80 MM HG: ICD-10-PCS | Mod: HCNC,CPTII,S$GLB, | Performed by: PHYSICAL MEDICINE & REHABILITATION

## 2020-05-25 PROCEDURE — 1159F MED LIST DOCD IN RCRD: CPT | Mod: HCNC,S$GLB,, | Performed by: PHYSICAL MEDICINE & REHABILITATION

## 2020-05-25 PROCEDURE — 1159F PR MEDICATION LIST DOCUMENTED IN MEDICAL RECORD: ICD-10-PCS | Mod: HCNC,S$GLB,, | Performed by: PHYSICAL MEDICINE & REHABILITATION

## 2020-05-25 PROCEDURE — 1101F PR PT FALLS ASSESS DOC 0-1 FALLS W/OUT INJ PAST YR: ICD-10-PCS | Mod: HCNC,CPTII,S$GLB, | Performed by: PHYSICAL MEDICINE & REHABILITATION

## 2020-05-25 PROCEDURE — 99999 PR PBB SHADOW E&M-EST. PATIENT-LVL III: ICD-10-PCS | Mod: PBBFAC,HCNC,, | Performed by: PHYSICAL MEDICINE & REHABILITATION

## 2020-05-25 PROCEDURE — 1125F AMNT PAIN NOTED PAIN PRSNT: CPT | Mod: HCNC,S$GLB,, | Performed by: PHYSICAL MEDICINE & REHABILITATION

## 2020-05-25 PROCEDURE — 99214 PR OFFICE/OUTPT VISIT, EST, LEVL IV, 30-39 MIN: ICD-10-PCS | Mod: HCNC,S$GLB,, | Performed by: PHYSICAL MEDICINE & REHABILITATION

## 2020-05-25 PROCEDURE — 1125F PR PAIN SEVERITY QUANTIFIED, PAIN PRESENT: ICD-10-PCS | Mod: HCNC,S$GLB,, | Performed by: PHYSICAL MEDICINE & REHABILITATION

## 2020-05-25 PROCEDURE — 99999 PR PBB SHADOW E&M-EST. PATIENT-LVL III: CPT | Mod: PBBFAC,HCNC,, | Performed by: PHYSICAL MEDICINE & REHABILITATION

## 2020-05-25 PROCEDURE — 3078F DIAST BP <80 MM HG: CPT | Mod: HCNC,CPTII,S$GLB, | Performed by: PHYSICAL MEDICINE & REHABILITATION

## 2020-05-26 ENCOUNTER — TELEPHONE (OUTPATIENT)
Dept: HEMATOLOGY/ONCOLOGY | Facility: CLINIC | Age: 75
End: 2020-05-26

## 2020-05-26 DIAGNOSIS — N18.9 ANEMIA IN CHRONIC KIDNEY DISEASE, UNSPECIFIED CKD STAGE: Primary | ICD-10-CM

## 2020-05-26 DIAGNOSIS — D63.1 ANEMIA IN CHRONIC KIDNEY DISEASE, UNSPECIFIED CKD STAGE: Primary | ICD-10-CM

## 2020-05-27 ENCOUNTER — OFFICE VISIT (OUTPATIENT)
Dept: FAMILY MEDICINE | Facility: CLINIC | Age: 75
End: 2020-05-27
Payer: MEDICARE

## 2020-05-27 ENCOUNTER — LAB VISIT (OUTPATIENT)
Dept: FAMILY MEDICINE | Facility: CLINIC | Age: 75
End: 2020-05-27
Payer: MEDICARE

## 2020-05-27 VITALS
HEART RATE: 77 BPM | WEIGHT: 125.69 LBS | HEIGHT: 61 IN | BODY MASS INDEX: 23.73 KG/M2 | SYSTOLIC BLOOD PRESSURE: 130 MMHG | OXYGEN SATURATION: 96 % | TEMPERATURE: 98 F | DIASTOLIC BLOOD PRESSURE: 80 MMHG

## 2020-05-27 DIAGNOSIS — M70.62 GREATER TROCHANTERIC BURSITIS OF LEFT HIP: ICD-10-CM

## 2020-05-27 DIAGNOSIS — D63.1 ANEMIA IN CHRONIC KIDNEY DISEASE, UNSPECIFIED CKD STAGE: ICD-10-CM

## 2020-05-27 DIAGNOSIS — E86.0 DEHYDRATION: ICD-10-CM

## 2020-05-27 DIAGNOSIS — E11.3292 CONTROLLED TYPE 2 DIABETES MELLITUS WITH LEFT EYE AFFECTED BY MILD NONPROLIFERATIVE RETINOPATHY WITHOUT MACULAR EDEMA, WITHOUT LONG-TERM CURRENT USE OF INSULIN: Chronic | ICD-10-CM

## 2020-05-27 DIAGNOSIS — I10 ESSENTIAL HYPERTENSION: Chronic | ICD-10-CM

## 2020-05-27 DIAGNOSIS — K52.9 CHRONIC DIARRHEA: Primary | ICD-10-CM

## 2020-05-27 DIAGNOSIS — N18.9 ANEMIA IN CHRONIC KIDNEY DISEASE, UNSPECIFIED CKD STAGE: ICD-10-CM

## 2020-05-27 DIAGNOSIS — M25.552 LEFT HIP PAIN: ICD-10-CM

## 2020-05-27 PROCEDURE — 1126F AMNT PAIN NOTED NONE PRSNT: CPT | Mod: HCNC,S$GLB,, | Performed by: FAMILY MEDICINE

## 2020-05-27 PROCEDURE — 1101F PT FALLS ASSESS-DOCD LE1/YR: CPT | Mod: HCNC,CPTII,S$GLB, | Performed by: FAMILY MEDICINE

## 2020-05-27 PROCEDURE — 99999 PR PBB SHADOW E&M-EST. PATIENT-LVL V: ICD-10-PCS | Mod: PBBFAC,HCNC,, | Performed by: FAMILY MEDICINE

## 2020-05-27 PROCEDURE — 3075F SYST BP GE 130 - 139MM HG: CPT | Mod: HCNC,CPTII,S$GLB, | Performed by: FAMILY MEDICINE

## 2020-05-27 PROCEDURE — U0003 INFECTIOUS AGENT DETECTION BY NUCLEIC ACID (DNA OR RNA); SEVERE ACUTE RESPIRATORY SYNDROME CORONAVIRUS 2 (SARS-COV-2) (CORONAVIRUS DISEASE [COVID-19]), AMPLIFIED PROBE TECHNIQUE, MAKING USE OF HIGH THROUGHPUT TECHNOLOGIES AS DESCRIBED BY CMS-2020-01-R: HCPCS | Mod: HCNC

## 2020-05-27 PROCEDURE — 3044F PR MOST RECENT HEMOGLOBIN A1C LEVEL <7.0%: ICD-10-PCS | Mod: HCNC,CPTII,S$GLB, | Performed by: FAMILY MEDICINE

## 2020-05-27 PROCEDURE — 3079F DIAST BP 80-89 MM HG: CPT | Mod: HCNC,CPTII,S$GLB, | Performed by: FAMILY MEDICINE

## 2020-05-27 PROCEDURE — 3075F PR MOST RECENT SYSTOLIC BLOOD PRESS GE 130-139MM HG: ICD-10-PCS | Mod: HCNC,CPTII,S$GLB, | Performed by: FAMILY MEDICINE

## 2020-05-27 PROCEDURE — 1159F MED LIST DOCD IN RCRD: CPT | Mod: HCNC,S$GLB,, | Performed by: FAMILY MEDICINE

## 2020-05-27 PROCEDURE — 1101F PR PT FALLS ASSESS DOC 0-1 FALLS W/OUT INJ PAST YR: ICD-10-PCS | Mod: HCNC,CPTII,S$GLB, | Performed by: FAMILY MEDICINE

## 2020-05-27 PROCEDURE — 1126F PR PAIN SEVERITY QUANTIFIED, NO PAIN PRESENT: ICD-10-PCS | Mod: HCNC,S$GLB,, | Performed by: FAMILY MEDICINE

## 2020-05-27 PROCEDURE — 99999 PR PBB SHADOW E&M-EST. PATIENT-LVL V: CPT | Mod: PBBFAC,HCNC,, | Performed by: FAMILY MEDICINE

## 2020-05-27 PROCEDURE — 99214 PR OFFICE/OUTPT VISIT, EST, LEVL IV, 30-39 MIN: ICD-10-PCS | Mod: HCNC,S$GLB,, | Performed by: FAMILY MEDICINE

## 2020-05-27 PROCEDURE — 3044F HG A1C LEVEL LT 7.0%: CPT | Mod: HCNC,CPTII,S$GLB, | Performed by: FAMILY MEDICINE

## 2020-05-27 PROCEDURE — 99214 OFFICE O/P EST MOD 30 MIN: CPT | Mod: HCNC,S$GLB,, | Performed by: FAMILY MEDICINE

## 2020-05-27 PROCEDURE — 3079F PR MOST RECENT DIASTOLIC BLOOD PRESSURE 80-89 MM HG: ICD-10-PCS | Mod: HCNC,CPTII,S$GLB, | Performed by: FAMILY MEDICINE

## 2020-05-27 PROCEDURE — 1159F PR MEDICATION LIST DOCUMENTED IN MEDICAL RECORD: ICD-10-PCS | Mod: HCNC,S$GLB,, | Performed by: FAMILY MEDICINE

## 2020-05-27 NOTE — PROGRESS NOTES
Chief Complaint   Patient presents with    Chronic Pain    Diarrhea       HPI  Regino Lawrence is a 74 y.o. female with multiple medical diagnoses as listed in the medical history and problem list that presents for follow-up for chronic pain and diarrhea    She is having chronic pain in her right hip that is flaring up. She is pending injection with pain management. She is having left sided groin pain also.    She has been having diarrhea for two months. She is watery stools. No recent antibiotic use. No fever or chills. She has been having weakness and fatigue. She is trying to stay hydrated but this has been difficult. She is using an over the counter antidiarrheal but this is not helping.    HPI    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, without long-term current use of insulin    Sarcoidosis    Corneal scar, right eye    Nuclear sclerosis    Senile nuclear sclerosis    Immunosuppression    Left shoulder pain    Cervical spinal stenosis    Patient is Church    GERD (gastroesophageal reflux disease)    Debility    S/P cervical spinal fusion    Chronic bilateral low back pain with left-sided sciatica    Degenerative disc disease, lumbar    Poor motor control of trunk    Impaired mobility    Muscle weakness    Iron deficiency anemia    Anemia in CKD (chronic kidney disease)    Refusal of blood transfusions as patient is [place]    Current use of steroid medication    DM type 2 without retinopathy    Pseudophakia    Insufficiency of tear film of both eyes    Refractive error    Myopathy     "Osteopenia    Calcification of aorta    Dry eye syndrome, bilateral    Neck pain    Lumbar disc herniation with radiculopathy    Antalgic gait    Lumbar radiculopathy    Lumbar stenosis with neurogenic claudication    Chronic bilateral low back pain without sciatica    Anemia in chronic kidney disease    Greater trochanteric bursitis of left hip    Left hip pain    FAUSTIN (dyspnea on exertion)    Chest pain, atypical    Hemispheric retinal vein occlusion with macular edema of left eye    Degenerative joint disease (DJD) of hip    Chronic pain    Dyspnea on exertion    Left sided numbness    Sacroiliitis    Chronic, continuous use of opioids         ROS  Review of Systems   Constitutional: Negative for chills, diaphoresis, fatigue, fever and unexpected weight change.   HENT: Negative for rhinorrhea, sinus pressure, sore throat and tinnitus.    Eyes: Negative for photophobia and visual disturbance.   Respiratory: Negative for cough, shortness of breath and wheezing.    Cardiovascular: Negative for chest pain and palpitations.   Gastrointestinal: Positive for diarrhea. Negative for abdominal pain, blood in stool, constipation, nausea and vomiting.   Genitourinary: Negative for dysuria, flank pain, frequency and vaginal discharge.   Musculoskeletal: Negative for arthralgias and joint swelling.   Skin: Negative for rash.   Neurological: Negative for speech difficulty, weakness, light-headedness and headaches.   Psychiatric/Behavioral: Negative for behavioral problems and dysphoric mood.       Physical Exam  Vitals:    05/27/20 1001   BP: 130/80   BP Location: Right arm   Patient Position: Sitting   BP Method: Small (Manual)   Pulse: 77   Temp: 98.2 °F (36.8 °C)   TempSrc: Oral   SpO2: 96%   Weight: 57 kg (125 lb 10.6 oz)   Height: 5' 1" (1.549 m)    Body mass index is 23.74 kg/m².  Weight: 57 kg (125 lb 10.6 oz)   Height: 5' 1" (154.9 cm)     Physical Exam   Constitutional: She is oriented to person, " place, and time. She appears well-developed and well-nourished. No distress.   Eyes: EOM are normal.   Neck: Neck supple.   Cardiovascular: Normal rate and regular rhythm. Exam reveals no gallop and no friction rub.   No murmur heard.  Pulmonary/Chest: Effort normal and breath sounds normal. No respiratory distress. She has no wheezes. She has no rales.   Abdominal: Soft. She exhibits distension. There is tenderness.   Diffuse TTP   Lymphadenopathy:     She has no cervical adenopathy.   Neurological: She is alert and oriented to person, place, and time.   Skin: Skin is warm and dry. No rash noted.   Psychiatric: She has a normal mood and affect. Her behavior is normal.   Nursing note and vitals reviewed.      ALLERGIES AND MEDICATIONS: updated and reviewed.  Review of patient's allergies indicates:   Allergen Reactions    Azathioprine Shortness Of Breath and Other (See Comments)     Fatigue     Medication List with Changes/Refills   Current Medications    ACCU-CHEK FASTCLIX LANCING DEV KIT    USE AS DIRECTED.    ALCOHOL ANTISEPTIC PADS (ALCOHOL PREP PADS TOP)        ATORVASTATIN (LIPITOR) 20 MG TABLET    Take 1 tablet (20 mg total) by mouth once daily.    BLOOD SUGAR DIAGNOSTIC (ACCU-CHEK SMARTVIEW TEST STRIP) STRP    Use as directed to check blood sugar twice daily.    BLOOD-GLUCOSE METER KIT    Use as instructed    CALCIUM CARBONATE (OS-MALCOLM) 600 MG CALCIUM (1,500 MG) TAB    Take 1 tablet by mouth 2 (two) times daily.     CARVEDILOL (COREG) 25 MG TABLET    Take 1 tablet (25 mg total) by mouth 2 (two) times daily.    CLONIDINE (CATAPRES) 0.3 MG TABLET    Take 1 tablet (0.3 mg total) by mouth 3 (three) times daily.    DIAPER,BRIEF,ADULT,DISPOSABLE MISC        EPOETIN WOLFGANG-EPBX (RETACRIT) 10,000 UNIT/ML IMJECTION    Inject 20,000 Units into the skin every 14 (fourteen) days.    FENOFIBRATE 160 MG TAB    Take 1 tablet by mouth once daily    FLUZONE HIGH-DOSE 2019-20, PF, 180 MCG/0.5 ML SYRG    PHARMACIST ADMINISTERED  IMMUNIZATION ADMINISTERED AT TIME OF DISPENSING    FOLIC ACID (FOLVITE) 1 MG TABLET    Take 1 tablet by mouth once daily    GABAPENTIN (NEURONTIN) 400 MG CAPSULE    Take 1 capsule (400 mg total) by mouth 3 (three) times daily.    HYDROCODONE-ACETAMINOPHEN (NORCO) 5-325 MG PER TABLET    Take 1 tablet by mouth every 6 (six) hours as needed.    LEVOTHYROXINE (SYNTHROID) 50 MCG TABLET    TAKE 1 TABLET BY MOUTH ONCE DAILY BEFORE BREAKFAST    METFORMIN (GLUCOPHAGE) 1000 MG TABLET    Take 1 tablet (1,000 mg total) by mouth 2 (two) times daily with meals.    NIFEDIPINE (PROCARDIA-XL) 30 MG (OSM) 24 HR TABLET    Take 1 tablet (30 mg total) by mouth once daily.    OXYBUTYNIN (DITROPAN-XL) 5 MG TR24    Take 1 tablet (5 mg total) by mouth once daily.    PREDNISONE (DELTASONE) 5 MG TABLET    Take 1 tablet (5 mg total) by mouth once daily.    TIZANIDINE (ZANAFLEX) 2 MG TABLET    Take 2 tablets (4 mg total) by mouth every 8 (eight) hours as needed.       Assessment & Plan  1. Chronic diarrhea    2. Dehydration    3. 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 Essential hypertension    5. Left hip pain    6. Greater trochanteric bursitis of left hip        Problem List Items Addressed This Visit        Ophtho    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, without long-term current use of insulin (Chronic)  -stable on current regimen       Cardiac/Vascular    Essential hypertension (Chronic)  -controlled on current regimen       Orthopedic    Greater trochanteric bursitis of left hip  -follow up with pain management for injection    Left hip pain  -follow up with pain management for injection      Other Visit Diagnoses     Chronic diarrhea    -  Primary  -try imodium 30 min before meals  -if this does not work will try questran  -discussed  hydration to improve energy    Relevant Orders    H. pylori antigen, stool    Occult blood x 1, stool    WBC, Stool    Rotavirus antigen, stool    Giardia / Cryptosporidum, EIA    Stool Exam-Ova,Cysts,Parasites    Clostridium difficile EIA    Stool culture    Dehydration      -discussed hydration with sports drinks and pedialyte          Follow up in about 3 months (around 8/27/2020).    Other Orders Placed This Visit:  Orders Placed This Encounter   Procedures    Clostridium difficile EIA    Stool culture    H. pylori antigen, stool    Occult blood x 1, stool    WBC, Stool    Rotavirus antigen, stool    Giardia / Cryptosporidum, EIA    Stool Exam-Ova,Cysts,Parasites

## 2020-05-27 NOTE — PATIENT INSTRUCTIONS
I recommend you begin taking imodium 30 min before each meal  If this does not help let me know  Diet for Vomiting or Diarrhea (Adult)    Your symptoms may return or get worse after eating certain foods listed below. If this happens, stop eating these foods until your symptoms ease and you feel better.  Once the vomiting stops, follow the steps below.   During the first 12 to 24 hours  During the first 12 to 24 hours, follow this diet:  · Drinks. Plain water, sport drinks like electrolyte solutions, soft drinks without caffeine, mineral water (plain or flavored), clear fruit juices, and decaffeinated tea and coffee.  · Soups. Clear broth.  · Desserts. Plain gelatin, popsicles, and fruit juice bars. As you feel better, you may add 6 to 8 ounces of yogurt per day. If you have diarrhea, don't have foods or drinks that contain sugar, high-fructose corn syrup, or sugar alcohols.  During the next 24 hours  During the next 24 hours you may add the following to the above:  · Hot cereal, plain toast, bread, rolls, and crackers  · Plain noodles, rice, mashed potatoes, and chicken noodle or rice soup  · Unsweetened canned fruit (but not pineapple) and bananas  Don't eat more than 15 grams of fat a day. Do this by staying away from margarine, butter, oils, mayonnaise, sauces, gravies, fried foods, peanut butter, meat, poultry, and fish.  Don't eat much fiber. Stay away from raw or cooked vegetables, fresh fruits (except bananas), and bran cereals.  Limit how much caffeine and chocolate you have. Do not use any spices or seasonings except salt.  During the next 24 hours  Slowly go back to your normal diet, as you feel better and your symptoms ease.  Date Last Reviewed: 8/1/2016  © 7666-4540 [employer]. [address] [place] [address], [place] [address]. All rights reserved. This information is not intended as a substitute for professional medical care. Always follow your healthcare professional's instructions.

## 2020-05-28 LAB — SARS-COV-2 RNA RESP QL NAA+PROBE: NOT DETECTED

## 2020-05-31 DIAGNOSIS — M54.42 CHRONIC BILATERAL LOW BACK PAIN WITH LEFT-SIDED SCIATICA: ICD-10-CM

## 2020-05-31 DIAGNOSIS — G89.29 CHRONIC BILATERAL LOW BACK PAIN WITH LEFT-SIDED SCIATICA: ICD-10-CM

## 2020-06-01 RX ORDER — GABAPENTIN 400 MG/1
CAPSULE ORAL
Qty: 90 CAPSULE | Refills: 1 | Status: SHIPPED | OUTPATIENT
Start: 2020-06-01 | End: 2020-09-11 | Stop reason: SDUPTHER

## 2020-06-02 ENCOUNTER — PATIENT MESSAGE (OUTPATIENT)
Dept: FAMILY MEDICINE | Facility: CLINIC | Age: 75
End: 2020-06-02

## 2020-06-02 ENCOUNTER — LAB VISIT (OUTPATIENT)
Dept: LAB | Facility: HOSPITAL | Age: 75
End: 2020-06-02
Attending: FAMILY MEDICINE
Payer: MEDICARE

## 2020-06-02 DIAGNOSIS — M51.36 DEGENERATIVE DISC DISEASE, LUMBAR: ICD-10-CM

## 2020-06-02 DIAGNOSIS — K52.9 CHRONIC DIARRHEA: ICD-10-CM

## 2020-06-02 PROCEDURE — 87209 SMEAR COMPLEX STAIN: CPT | Mod: HCNC

## 2020-06-02 PROCEDURE — 87449 NOS EACH ORGANISM AG IA: CPT | Mod: HCNC

## 2020-06-02 PROCEDURE — 89055 LEUKOCYTE ASSESSMENT FECAL: CPT | Mod: HCNC

## 2020-06-02 PROCEDURE — 87338 HPYLORI STOOL AG IA: CPT | Mod: HCNC

## 2020-06-02 PROCEDURE — 87425 ROTAVIRUS AG IA: CPT | Mod: HCNC

## 2020-06-02 PROCEDURE — 87045 FECES CULTURE AEROBIC BACT: CPT | Mod: HCNC

## 2020-06-02 PROCEDURE — 87329 GIARDIA AG IA: CPT | Mod: HCNC

## 2020-06-02 PROCEDURE — 87427 SHIGA-LIKE TOXIN AG IA: CPT | Mod: 59,HCNC

## 2020-06-02 PROCEDURE — 87046 STOOL CULTR AEROBIC BACT EA: CPT | Mod: 59,HCNC

## 2020-06-02 PROCEDURE — 87324 CLOSTRIDIUM AG IA: CPT | Mod: HCNC

## 2020-06-02 PROCEDURE — 82272 OCCULT BLD FECES 1-3 TESTS: CPT | Mod: HCNC

## 2020-06-02 RX ORDER — HYDROCODONE BITARTRATE AND ACETAMINOPHEN 5; 325 MG/1; MG/1
1 TABLET ORAL EVERY 6 HOURS PRN
Qty: 90 TABLET | Refills: 0 | Status: SHIPPED | OUTPATIENT
Start: 2020-06-02 | End: 2020-07-02

## 2020-06-02 RX ORDER — HYDROCODONE BITARTRATE AND ACETAMINOPHEN 5; 325 MG/1; MG/1
1 TABLET ORAL EVERY 6 HOURS PRN
Qty: 90 TABLET | Refills: 0 | Status: SHIPPED | OUTPATIENT
Start: 2020-08-02 | End: 2020-07-14 | Stop reason: SDUPTHER

## 2020-06-02 RX ORDER — HYDROCODONE BITARTRATE AND ACETAMINOPHEN 5; 325 MG/1; MG/1
1 TABLET ORAL EVERY 6 HOURS PRN
Qty: 90 TABLET | Refills: 0 | Status: SHIPPED | OUTPATIENT
Start: 2020-07-02 | End: 2020-08-01

## 2020-06-03 LAB
C DIFF GDH STL QL: NEGATIVE
C DIFF TOX A+B STL QL IA: NEGATIVE
CRYPTOSP AG STL QL IA: NEGATIVE
E COLI SXT1 STL QL IA: NEGATIVE
E COLI SXT2 STL QL IA: NEGATIVE
G LAMBLIA AG STL QL IA: NEGATIVE
OB PNL STL: NEGATIVE
RV AG STL QL IA.RAPID: NEGATIVE
WBC #/AREA STL HPF: NORMAL /[HPF]

## 2020-06-05 ENCOUNTER — LAB VISIT (OUTPATIENT)
Dept: LAB | Facility: HOSPITAL | Age: 75
End: 2020-06-05
Attending: OPHTHALMOLOGY
Payer: MEDICARE

## 2020-06-05 DIAGNOSIS — N18.9 ANEMIA IN CKD (CHRONIC KIDNEY DISEASE): ICD-10-CM

## 2020-06-05 DIAGNOSIS — D63.1 ANEMIA IN CKD (CHRONIC KIDNEY DISEASE): ICD-10-CM

## 2020-06-05 LAB
ERYTHROCYTE [DISTWIDTH] IN BLOOD BY AUTOMATED COUNT: 13.2 % (ref 11.5–14.5)
HCT VFR BLD AUTO: 36.9 % (ref 37–48.5)
HGB BLD-MCNC: 11.5 G/DL (ref 12–16)
IMM GRANULOCYTES # BLD AUTO: 0.02 K/UL (ref 0–0.04)
MCH RBC QN AUTO: 31.5 PG (ref 27–31)
MCHC RBC AUTO-ENTMCNC: 31.2 G/DL (ref 32–36)
MCV RBC AUTO: 101 FL (ref 82–98)
NEUTROPHILS # BLD AUTO: 2.4 K/UL (ref 1.8–7.7)
O+P STL MICRO: NORMAL
PLATELET # BLD AUTO: 261 K/UL (ref 150–350)
PMV BLD AUTO: 9 FL (ref 9.2–12.9)
RBC # BLD AUTO: 3.65 M/UL (ref 4–5.4)
WBC # BLD AUTO: 4.63 K/UL (ref 3.9–12.7)

## 2020-06-05 PROCEDURE — 36415 COLL VENOUS BLD VENIPUNCTURE: CPT | Mod: HCNC

## 2020-06-05 PROCEDURE — 85027 COMPLETE CBC AUTOMATED: CPT | Mod: HCNC

## 2020-06-06 LAB — BACTERIA STL CULT: NORMAL

## 2020-06-08 ENCOUNTER — PATIENT MESSAGE (OUTPATIENT)
Dept: FAMILY MEDICINE | Facility: CLINIC | Age: 75
End: 2020-06-08

## 2020-06-08 DIAGNOSIS — D86.9 SARCOIDOSIS: Chronic | ICD-10-CM

## 2020-06-08 DIAGNOSIS — I10 ESSENTIAL HYPERTENSION: Chronic | ICD-10-CM

## 2020-06-08 DIAGNOSIS — G72.9 MYOPATHY: ICD-10-CM

## 2020-06-08 RX ORDER — CARVEDILOL 25 MG/1
25 TABLET ORAL 2 TIMES DAILY
Qty: 180 TABLET | Refills: 3 | Status: SHIPPED | OUTPATIENT
Start: 2020-06-08 | End: 2020-06-22 | Stop reason: SDUPTHER

## 2020-06-08 RX ORDER — CLONIDINE HYDROCHLORIDE 0.3 MG/1
0.3 TABLET ORAL 3 TIMES DAILY
Qty: 270 TABLET | Refills: 1 | Status: SHIPPED | OUTPATIENT
Start: 2020-06-08 | End: 2021-02-04 | Stop reason: SDUPTHER

## 2020-06-09 LAB — H PYLORI AG STL QL IA: NOT DETECTED

## 2020-06-09 RX ORDER — PREDNISONE 5 MG/1
5 TABLET ORAL DAILY
Qty: 90 TABLET | Refills: 1 | Status: SHIPPED | OUTPATIENT
Start: 2020-06-09 | End: 2020-06-17 | Stop reason: SDUPTHER

## 2020-06-10 ENCOUNTER — OFFICE VISIT (OUTPATIENT)
Dept: PAIN MEDICINE | Facility: CLINIC | Age: 75
End: 2020-06-10
Attending: ANESTHESIOLOGY
Payer: MEDICARE

## 2020-06-10 VITALS
OXYGEN SATURATION: 100 % | WEIGHT: 125.69 LBS | HEART RATE: 85 BPM | RESPIRATION RATE: 18 BRPM | HEIGHT: 61 IN | TEMPERATURE: 98 F | DIASTOLIC BLOOD PRESSURE: 73 MMHG | SYSTOLIC BLOOD PRESSURE: 140 MMHG | BODY MASS INDEX: 23.73 KG/M2

## 2020-06-10 DIAGNOSIS — G89.4 CHRONIC PAIN DISORDER: ICD-10-CM

## 2020-06-10 DIAGNOSIS — Z01.812 PRE-PROCEDURE LAB EXAM: Primary | ICD-10-CM

## 2020-06-10 DIAGNOSIS — M54.16 LUMBAR RADICULOPATHY: Primary | ICD-10-CM

## 2020-06-10 DIAGNOSIS — M70.62 GREATER TROCHANTERIC BURSITIS OF LEFT HIP: ICD-10-CM

## 2020-06-10 DIAGNOSIS — M47.816 LUMBAR SPONDYLOSIS: ICD-10-CM

## 2020-06-10 DIAGNOSIS — M51.36 DDD (DEGENERATIVE DISC DISEASE), LUMBAR: ICD-10-CM

## 2020-06-10 PROCEDURE — 99214 PR OFFICE/OUTPT VISIT, EST, LEVL IV, 30-39 MIN: ICD-10-PCS | Mod: HCNC,S$GLB,, | Performed by: ANESTHESIOLOGY

## 2020-06-10 PROCEDURE — 1101F PR PT FALLS ASSESS DOC 0-1 FALLS W/OUT INJ PAST YR: ICD-10-PCS | Mod: HCNC,CPTII,S$GLB, | Performed by: ANESTHESIOLOGY

## 2020-06-10 PROCEDURE — 3077F PR MOST RECENT SYSTOLIC BLOOD PRESSURE >= 140 MM HG: ICD-10-PCS | Mod: HCNC,CPTII,S$GLB, | Performed by: ANESTHESIOLOGY

## 2020-06-10 PROCEDURE — 1159F MED LIST DOCD IN RCRD: CPT | Mod: HCNC,S$GLB,, | Performed by: ANESTHESIOLOGY

## 2020-06-10 PROCEDURE — 3077F SYST BP >= 140 MM HG: CPT | Mod: HCNC,CPTII,S$GLB, | Performed by: ANESTHESIOLOGY

## 2020-06-10 PROCEDURE — 1159F PR MEDICATION LIST DOCUMENTED IN MEDICAL RECORD: ICD-10-PCS | Mod: HCNC,S$GLB,, | Performed by: ANESTHESIOLOGY

## 2020-06-10 PROCEDURE — 99999 PR PBB SHADOW E&M-EST. PATIENT-LVL III: CPT | Mod: PBBFAC,HCNC,, | Performed by: ANESTHESIOLOGY

## 2020-06-10 PROCEDURE — 99999 PR PBB SHADOW E&M-EST. PATIENT-LVL III: ICD-10-PCS | Mod: PBBFAC,HCNC,, | Performed by: ANESTHESIOLOGY

## 2020-06-10 PROCEDURE — 3078F PR MOST RECENT DIASTOLIC BLOOD PRESSURE < 80 MM HG: ICD-10-PCS | Mod: HCNC,CPTII,S$GLB, | Performed by: ANESTHESIOLOGY

## 2020-06-10 PROCEDURE — 1125F AMNT PAIN NOTED PAIN PRSNT: CPT | Mod: HCNC,S$GLB,, | Performed by: ANESTHESIOLOGY

## 2020-06-10 PROCEDURE — 1125F PR PAIN SEVERITY QUANTIFIED, PAIN PRESENT: ICD-10-PCS | Mod: HCNC,S$GLB,, | Performed by: ANESTHESIOLOGY

## 2020-06-10 PROCEDURE — 1101F PT FALLS ASSESS-DOCD LE1/YR: CPT | Mod: HCNC,CPTII,S$GLB, | Performed by: ANESTHESIOLOGY

## 2020-06-10 PROCEDURE — 3078F DIAST BP <80 MM HG: CPT | Mod: HCNC,CPTII,S$GLB, | Performed by: ANESTHESIOLOGY

## 2020-06-10 PROCEDURE — 99214 OFFICE O/P EST MOD 30 MIN: CPT | Mod: HCNC,S$GLB,, | Performed by: ANESTHESIOLOGY

## 2020-06-10 NOTE — PROGRESS NOTES
Subjective:      Patient ID: Regino Lawrence is a 74 y.o. female.    Chief Complaint: No chief complaint on file.    Referred by: No ref. provider found     HPI  She is here for follow-up of her left hip pain and left low back pain. She states her pain is similar in character and severity as prior. She describes her pain as constant, dull, aching left low back pain with intermittent radiation to her left hip and down her left leg to her left foot. She states the pain is exacerbated by prolonged sitting, standing, and walking. She says it is improved with rest and medications. She does take gabapentin 400 mg TID, Zanaflex 4 mg q8 hrs PRN, and Norco 5-325 mg PRN (4x/day). She does not participate in home exercises 2/2 ongoing pain. She would like to discuss other interventions for her pain at this time.       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, without long-term current use of insulin     Depression     Diabetes mellitus, type 2     Eye injury as a child     k-abrasion  od    Hyperlipidemia     Hypertension     Hypothyroidism     Immune deficiency disorder     Immune disorder     Myalgia and myositis 2012    Osteoporosis     Polyneuropathy     Renal manifestation of secondary diabetes mellitus     Sarcoidosis     Ulcer     no cancer    Urinary incontinence        Past Surgical History:   Procedure Laterality Date    CARPAL TUNNEL RELEASE      Rt wrist    CATARACT EXTRACTION W/  INTRAOCULAR LENS IMPLANT Right 2015    Dr. Azevedo    CATARACT EXTRACTION W/  INTRAOCULAR LENS IMPLANT Left 2015    Dr. Azevedo     SECTION      CHOLECYSTECTOMY      INJECTION OF  JOINT Left 3/21/2019    Procedure: Injection, Joint  fLUOROSCOPIC jOINT iNJECTION (hIP iNJECTION) LEFT ROCH BURSA AS WELL LEFT TROCHANTERIC BURSA;  Surgeon: Alfonso Richards MD;  Location: [facility];  Service: Pain Management;  Laterality: Left;  NEEDS CONSENT, DIABETIC    INJECTION OF JOINT Left 7/22/2019    Procedure: Injection, Joint FLUOROSCOPIC JOINT INJECTION (HIP INJECTION) LEFT HIP;  Surgeon: Alfonso Richards MD;  Location: [facility];  Service: Pain Management;  Laterality: Left;  NEEDS CONSENT    INJECTION OF JOINT Left 9/12/2019    Procedure: INJECTION, JOINT;  Surgeon: Alfonso Richards MD;  Location: [facility];  Service: Pain Management;  Laterality: Left;  Left Hip and Left GTB Injections    TRANSFORAMINAL EPIDURAL INJECTION OF STEROID Bilateral 12/5/2019    Procedure: INJECTION, STEROID, EPIDURAL, TRANSFORAMINAL APPROACH;  Surgeon: Alfonso Richards MD;  Location: [facility];  Service: Pain Management;  Laterality: Bilateral;  B/L TF RHIANNA L5  Consent Needed    TUBAL LIGATION         Review of patient's allergies indicates:   Allergen Reactions    Azathioprine Shortness Of Breath and Other (See Comments)     Fatigue       Current Outpatient Medications   Medication Sig Dispense Refill    ACCU-CHEK FASTCLIX LANCING DEV Kit USE AS DIRECTED. 1 each 0    ALCOHOL ANTISEPTIC PADS (ALCOHOL PREP PADS TOP)       atorvastatin (LIPITOR) 20 MG tablet Take 1 tablet (20 mg total) by mouth once daily. 90 tablet 3    blood sugar diagnostic (ACCU-CHEK SMARTVIEW TEST STRIP) Strp Use as directed to check blood sugar twice daily. 200 strip 3    blood-glucose meter kit Use as instructed 1 each 0    carvediloL (COREG) 25 MG tablet Take 1 tablet (25 mg total) by mouth 2 (two) times daily. 180 tablet 3    cloNIDine (CATAPRES) 0.3 MG tablet Take 1 tablet (0.3 mg total) by mouth 3 (three) times daily. 270 tablet 1    diaper,brief,adult,disposable Misc       epoetin german-epbx (RETACRIT) 10,000 unit/mL imjection Inject  20,000 Units into the skin every 14 (fourteen) days.      fenofibrate 160 MG Tab Take 1 tablet by mouth once daily 90 tablet 3    FLUZONE HIGH-DOSE 2019-20, PF, 180 mcg/0.5 mL Syrg PHARMACIST ADMINISTERED IMMUNIZATION ADMINISTERED AT TIME OF DISPENSING  0    folic acid (FOLVITE) 1 MG tablet Take 1 tablet by mouth once daily 90 tablet 0    gabapentin (NEURONTIN) 400 MG capsule TAKE 1 CAPSULE BY MOUTH THREE TIMES DAILY 90 capsule 1    [START ON 7/2/2020] HYDROcodone-acetaminophen (NORCO) 5-325 mg per tablet Take 1 tablet by mouth every 6 (six) hours as needed. 90 tablet 0    HYDROcodone-acetaminophen (NORCO) 5-325 mg per tablet Take 1 tablet by mouth every 6 (six) hours as needed. 90 tablet 0    [START ON 8/2/2020] HYDROcodone-acetaminophen (NORCO) 5-325 mg per tablet Take 1 tablet by mouth every 6 (six) hours as needed. 90 tablet 0    levothyroxine (SYNTHROID) 50 MCG tablet TAKE 1 TABLET BY MOUTH ONCE DAILY BEFORE BREAKFAST 90 tablet 1    metFORMIN (GLUCOPHAGE) 1000 MG tablet Take 1 tablet (1,000 mg total) by mouth 2 (two) times daily with meals. 180 tablet 1    NIFEdipine (PROCARDIA-XL) 30 MG (OSM) 24 hr tablet Take 1 tablet (30 mg total) by mouth once daily. 90 tablet 1    predniSONE (DELTASONE) 5 MG tablet Take 1 tablet (5 mg total) by mouth once daily. 90 tablet 1    tiZANidine (ZANAFLEX) 2 MG tablet Take 2 tablets (4 mg total) by mouth every 8 (eight) hours as needed. 270 tablet 0    calcium carbonate (OS-MALCOLM) 600 mg calcium (1,500 mg) Tab Take 1 tablet by mouth 2 (two) times daily.       oxybutynin (DITROPAN-XL) 5 MG TR24 Take 1 tablet (5 mg total) by mouth once daily. 30 tablet 11     No current facility-administered medications for this visit.        Family History   Problem Relation Age of Onset    Hypertension Mother     Cataracts Mother     No Known Problems Father     Hypertension Maternal Grandmother     Glaucoma Sister     Arthritis Sister     No Known Problems Brother     No Known  Problems Maternal Aunt     No Known Problems Maternal Uncle     No Known Problems Paternal Aunt     No Known Problems Paternal Uncle     No Known Problems Maternal Grandfather     No Known Problems Paternal Grandmother     No Known Problems Paternal Grandfather     Kidney failure Sister     Hepatitis Sister     Cancer Sister         bone cancer     Immunodeficiency Sister     Diabetes Son     Hypertension Son     Lupus Neg Hx     Rheum arthritis Neg Hx     Amblyopia Neg Hx     Blindness Neg Hx     Macular degeneration Neg Hx     Retinal detachment Neg Hx     Strabismus Neg Hx     Stroke Neg Hx     Thyroid disease Neg Hx     Endometrial cancer Neg Hx     Vaginal cancer Neg Hx     Cervical cancer Neg Hx        Social History     Socioeconomic History    Marital status:      Spouse name: Not on file    Number of children: Not on file    Years of education: Not on file    Highest education level: Not on file   Occupational History    Not on file   Social Needs    Financial resource strain: Not on file    Food insecurity:     Worry: Not on file     Inability: Not on file    Transportation needs:     Medical: Not on file     Non-medical: Not on file   Tobacco Use    Smoking status: Never Smoker    Smokeless tobacco: Never Used   Substance and Sexual Activity    Alcohol use: No    Drug use: No    Sexual activity: Not Currently     Partners: Male   Lifestyle    Physical activity:     Days per week: Not on file     Minutes per session: Not on file    Stress: Not on file   Relationships    Social connections:     Talks on phone: Not on file     Gets together: Not on file     Attends Christianity service: Not on file     Active member of club or organization: Not on file     Attends meetings of clubs or organizations: Not on file     Relationship status: Not on file   Other Topics Concern    Not on file   Social History Narrative    Not on file           Review of Systems  "  Musculoskeletal:        Left hip pain           Objective:   BP (!) 140/73   Pulse 85   Temp 97.5 °F (36.4 °C)   Resp 18   Ht 5' 1" (1.549 m)   Wt 57 kg (125 lb 10.6 oz)   LMP  (LMP Unknown)   SpO2 100%   BMI 23.74 kg/m²   Pain Disability Index Review:  Last 3 PDI Scores 6/10/2020 3/17/2020 10/1/2019   Pain Disability Index (PDI) 53 70 50     Normocephalic.  Atraumatic.  Affect appropriate.  Breathing unlabored.  Extra ocular muscles intact.       PHYSICAL EXAMINATION:    GENERAL: Well appearing, in no acute distress, alert and oriented x3.  PSYCH:  Mood and affect appropriate.  SKIN: Skin color, texture, turgor normal, no rashes or lesions.  HEAD/FACE:  Normocephalic, atraumatic. Cranial nerves grossly intact.  NECK: Positive pain to palpation over the cervical paraspinous muscles on left. Spurling Negative. Positive pain with neck flexion, extension, or lateral flexion.   CV: RRR with palpation of the radial artery.  PULM: No evidence of respiratory difficulty, symmetric chest rise.  GI:  Soft and non-tender.  BACK: Straight leg raising in the sitting position is positive to radicular pain on left. Positive pain to palpation over the facet joints of the lumbar spine on left. Positive facet loading (L>R). Decreased range of motion with pain reproduction with lumbar flexion.  EXTREMITIES: Peripheral joint ROM is full and pain free without obvious instability or laxity in all four extremities. No deformities, edema, or skin discoloration. Good capillary refill.  MUSCULOSKELETAL: Shoulder, hip, and knee provocative maneuvers are negative.  There is pain with palpation over the sacroiliac joints bilaterally. Positive pain to palpation of L GT bursa.  FABERs test is negative.  FADIRs test is negative.   Bilateral upper and lower extremity strength is normal and symmetric.  No atrophy or tone abnormalities are noted.  NEURO: Bilateral upper and lower extremity coordination and muscle stretch reflexes are " physiologic and symmetric.  Plantar response are downgoing. No clonus.  No loss of sensation is noted.  GAIT: antalgic, walks with cane.        Assessment:       Encounter Diagnoses   Name Primary?    Lumbar radiculopathy Yes    DDD (degenerative disc disease), lumbar     Lumbar spondylosis     Greater trochanteric bursitis of left hip     Chronic pain disorder          Plan:   We discussed with the patient the assessment and recommendations. The following is the plan we agreed on:  1.Schedule for repeat bilateral TF RHIANNA at L5/S1.    2. Will consider left hip joint and left GT bursa injection in future pending course of pain symptoms.    3. Continue gabapentin 400 mg TID.    4. Continue Zanaflex 4 mg q8 hrs PRN.    5. Continue Norco 5-325 mg q6 hrs PRN for breakthrough pain as pt reports benefit with medication and denies negative side-effects.    6. Encouraged home exercise for core strengthening and stretching.    RTC 2ks following injection.      Diagnoses and all orders for this visit:    Lumbar radiculopathy    DDD (degenerative disc disease), lumbar    Lumbar spondylosis    Greater trochanteric bursitis of left hip    Chronic pain disorder       Adriel Deleon MD  [facility], PGY V    I have personally taken the history and examined this patient and agree with the fellow's note as stated above.

## 2020-06-10 NOTE — H&P (VIEW-ONLY)
Subjective:      Patient ID: Regino Lawrence is a 74 y.o. female.    Chief Complaint: No chief complaint on file.    Referred by: No ref. provider found     HPI  She is here for follow-up of her left hip pain and left low back pain. She states her pain is similar in character and severity as prior. She describes her pain as constant, dull, aching left low back pain with intermittent radiation to her left hip and down her left leg to her left foot. She states the pain is exacerbated by prolonged sitting, standing, and walking. She says it is improved with rest and medications. She does take gabapentin 400 mg TID, Zanaflex 4 mg q8 hrs PRN, and Norco 5-325 mg PRN (4x/day). She does not participate in home exercises 2/2 ongoing pain. She would like to discuss other interventions for her pain at this time.       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, without long-term current use of insulin     Depression     Diabetes mellitus, type 2     Eye injury as a child     k-abrasion  od    Hyperlipidemia     Hypertension     Hypothyroidism     Immune deficiency disorder     Immune disorder     Myalgia and myositis 2012    Osteoporosis     Polyneuropathy     Renal manifestation of secondary diabetes mellitus     Sarcoidosis     Ulcer     no cancer    Urinary incontinence        Past Surgical History:   Procedure Laterality Date    CARPAL TUNNEL RELEASE      Rt wrist    CATARACT EXTRACTION W/  INTRAOCULAR LENS IMPLANT Right 2015    Dr. Azevedo    CATARACT EXTRACTION W/  INTRAOCULAR LENS IMPLANT Left 2015    Dr. Azevedo     SECTION      CHOLECYSTECTOMY      INJECTION OF  JOINT Left 3/21/2019    Procedure: Injection, Joint  fLUOROSCOPIC jOINT iNJECTION (hIP iNJECTION) LEFT ROCH BURSA AS WELL LEFT TROCHANTERIC BURSA;  Surgeon: Alfonso Richards MD;  Location: [facility];  Service: Pain Management;  Laterality: Left;  NEEDS CONSENT, DIABETIC    INJECTION OF JOINT Left 7/22/2019    Procedure: Injection, Joint FLUOROSCOPIC JOINT INJECTION (HIP INJECTION) LEFT HIP;  Surgeon: Alfonso Richards MD;  Location: [facility];  Service: Pain Management;  Laterality: Left;  NEEDS CONSENT    INJECTION OF JOINT Left 9/12/2019    Procedure: INJECTION, JOINT;  Surgeon: Alfonso Richards MD;  Location: [facility];  Service: Pain Management;  Laterality: Left;  Left Hip and Left GTB Injections    TRANSFORAMINAL EPIDURAL INJECTION OF STEROID Bilateral 12/5/2019    Procedure: INJECTION, STEROID, EPIDURAL, TRANSFORAMINAL APPROACH;  Surgeon: Alfonso Richards MD;  Location: [facility];  Service: Pain Management;  Laterality: Bilateral;  B/L TF RHIANNA L5  Consent Needed    TUBAL LIGATION         Review of patient's allergies indicates:   Allergen Reactions    Azathioprine Shortness Of Breath and Other (See Comments)     Fatigue       Current Outpatient Medications   Medication Sig Dispense Refill    ACCU-CHEK FASTCLIX LANCING DEV Kit USE AS DIRECTED. 1 each 0    ALCOHOL ANTISEPTIC PADS (ALCOHOL PREP PADS TOP)       atorvastatin (LIPITOR) 20 MG tablet Take 1 tablet (20 mg total) by mouth once daily. 90 tablet 3    blood sugar diagnostic (ACCU-CHEK SMARTVIEW TEST STRIP) Strp Use as directed to check blood sugar twice daily. 200 strip 3    blood-glucose meter kit Use as instructed 1 each 0    carvediloL (COREG) 25 MG tablet Take 1 tablet (25 mg total) by mouth 2 (two) times daily. 180 tablet 3    cloNIDine (CATAPRES) 0.3 MG tablet Take 1 tablet (0.3 mg total) by mouth 3 (three) times daily. 270 tablet 1    diaper,brief,adult,disposable Misc       epoetin german-epbx (RETACRIT) 10,000 unit/mL imjection Inject  20,000 Units into the skin every 14 (fourteen) days.      fenofibrate 160 MG Tab Take 1 tablet by mouth once daily 90 tablet 3    FLUZONE HIGH-DOSE 2019-20, PF, 180 mcg/0.5 mL Syrg PHARMACIST ADMINISTERED IMMUNIZATION ADMINISTERED AT TIME OF DISPENSING  0    folic acid (FOLVITE) 1 MG tablet Take 1 tablet by mouth once daily 90 tablet 0    gabapentin (NEURONTIN) 400 MG capsule TAKE 1 CAPSULE BY MOUTH THREE TIMES DAILY 90 capsule 1    [START ON 7/2/2020] HYDROcodone-acetaminophen (NORCO) 5-325 mg per tablet Take 1 tablet by mouth every 6 (six) hours as needed. 90 tablet 0    HYDROcodone-acetaminophen (NORCO) 5-325 mg per tablet Take 1 tablet by mouth every 6 (six) hours as needed. 90 tablet 0    [START ON 8/2/2020] HYDROcodone-acetaminophen (NORCO) 5-325 mg per tablet Take 1 tablet by mouth every 6 (six) hours as needed. 90 tablet 0    levothyroxine (SYNTHROID) 50 MCG tablet TAKE 1 TABLET BY MOUTH ONCE DAILY BEFORE BREAKFAST 90 tablet 1    metFORMIN (GLUCOPHAGE) 1000 MG tablet Take 1 tablet (1,000 mg total) by mouth 2 (two) times daily with meals. 180 tablet 1    NIFEdipine (PROCARDIA-XL) 30 MG (OSM) 24 hr tablet Take 1 tablet (30 mg total) by mouth once daily. 90 tablet 1    predniSONE (DELTASONE) 5 MG tablet Take 1 tablet (5 mg total) by mouth once daily. 90 tablet 1    tiZANidine (ZANAFLEX) 2 MG tablet Take 2 tablets (4 mg total) by mouth every 8 (eight) hours as needed. 270 tablet 0    calcium carbonate (OS-MALCOLM) 600 mg calcium (1,500 mg) Tab Take 1 tablet by mouth 2 (two) times daily.       oxybutynin (DITROPAN-XL) 5 MG TR24 Take 1 tablet (5 mg total) by mouth once daily. 30 tablet 11     No current facility-administered medications for this visit.        Family History   Problem Relation Age of Onset    Hypertension Mother     Cataracts Mother     No Known Problems Father     Hypertension Maternal Grandmother     Glaucoma Sister     Arthritis Sister     No Known Problems Brother     No Known  Problems Maternal Aunt     No Known Problems Maternal Uncle     No Known Problems Paternal Aunt     No Known Problems Paternal Uncle     No Known Problems Maternal Grandfather     No Known Problems Paternal Grandmother     No Known Problems Paternal Grandfather     Kidney failure Sister     Hepatitis Sister     Cancer Sister         bone cancer     Immunodeficiency Sister     Diabetes Son     Hypertension Son     Lupus Neg Hx     Rheum arthritis Neg Hx     Amblyopia Neg Hx     Blindness Neg Hx     Macular degeneration Neg Hx     Retinal detachment Neg Hx     Strabismus Neg Hx     Stroke Neg Hx     Thyroid disease Neg Hx     Endometrial cancer Neg Hx     Vaginal cancer Neg Hx     Cervical cancer Neg Hx        Social History     Socioeconomic History    Marital status:      Spouse name: Not on file    Number of children: Not on file    Years of education: Not on file    Highest education level: Not on file   Occupational History    Not on file   Social Needs    Financial resource strain: Not on file    Food insecurity:     Worry: Not on file     Inability: Not on file    Transportation needs:     Medical: Not on file     Non-medical: Not on file   Tobacco Use    Smoking status: Never Smoker    Smokeless tobacco: Never Used   Substance and Sexual Activity    Alcohol use: No    Drug use: No    Sexual activity: Not Currently     Partners: Male   Lifestyle    Physical activity:     Days per week: Not on file     Minutes per session: Not on file    Stress: Not on file   Relationships    Social connections:     Talks on phone: Not on file     Gets together: Not on file     Attends [employer] service: Not on file     Active member of club or organization: Not on file     Attends meetings of clubs or organizations: Not on file     Relationship status: Not on file   Other Topics Concern    Not on file   Social History Narrative    Not on file           Review of Systems  "  Musculoskeletal:        Left hip pain           Objective:   BP (!) 140/73   Pulse 85   Temp 97.5 °F (36.4 °C)   Resp 18   Ht 5' 1" (1.549 m)   Wt 57 kg (125 lb 10.6 oz)   LMP  (LMP Unknown)   SpO2 100%   BMI 23.74 kg/m²   Pain Disability Index Review:  Last 3 PDI Scores 6/10/2020 3/17/2020 10/1/2019   Pain Disability Index (PDI) 53 70 50     Normocephalic.  Atraumatic.  Affect appropriate.  Breathing unlabored.  Extra ocular muscles intact.       PHYSICAL EXAMINATION:    GENERAL: Well appearing, in no acute distress, alert and oriented x3.  PSYCH:  Mood and affect appropriate.  SKIN: Skin color, texture, turgor normal, no rashes or lesions.  HEAD/FACE:  Normocephalic, atraumatic. Cranial nerves grossly intact.  NECK: Positive pain to palpation over the cervical paraspinous muscles on left. Spurling Negative. Positive pain with neck flexion, extension, or lateral flexion.   CV: RRR with palpation of the radial artery.  PULM: No evidence of respiratory difficulty, symmetric chest rise.  GI:  Soft and non-tender.  BACK: Straight leg raising in the sitting position is positive to radicular pain on left. Positive pain to palpation over the facet joints of the lumbar spine on left. Positive facet loading (L>R). Decreased range of motion with pain reproduction with lumbar flexion.  EXTREMITIES: Peripheral joint ROM is full and pain free without obvious instability or laxity in all four extremities. No deformities, edema, or skin discoloration. Good capillary refill.  MUSCULOSKELETAL: Shoulder, hip, and knee provocative maneuvers are negative.  There is pain with palpation over the sacroiliac joints bilaterally. Positive pain to palpation of L GT bursa.  FABERs test is negative.  FADIRs test is negative.   Bilateral upper and lower extremity strength is normal and symmetric.  No atrophy or tone abnormalities are noted.  NEURO: Bilateral upper and lower extremity coordination and muscle stretch reflexes are " physiologic and symmetric.  Plantar response are downgoing. No clonus.  No loss of sensation is noted.  GAIT: antalgic, walks with cane.        Assessment:       Encounter Diagnoses   Name Primary?    Lumbar radiculopathy Yes    DDD (degenerative disc disease), lumbar     Lumbar spondylosis     Greater trochanteric bursitis of left hip     Chronic pain disorder          Plan:   We discussed with the patient the assessment and recommendations. The following is the plan we agreed on:  1.Schedule for repeat bilateral TF RHIANNA at L5/S1.    2. Will consider left hip joint and left GT bursa injection in future pending course of pain symptoms.    3. Continue gabapentin 400 mg TID.    4. Continue Zanaflex 4 mg q8 hrs PRN.    5. Continue Norco 5-325 mg q6 hrs PRN for breakthrough pain as pt reports benefit with medication and denies negative side-effects.    6. Encouraged home exercise for core strengthening and stretching.    RTC 2ks following injection.      Diagnoses and all orders for this visit:    Lumbar radiculopathy    DDD (degenerative disc disease), lumbar    Lumbar spondylosis    Greater trochanteric bursitis of left hip    Chronic pain disorder       Adriel Deleon MD  [facility] Pain Fellow, PGY V    I have personally taken the history and examined this patient and agree with the fellow's note as stated above.

## 2020-06-10 NOTE — PROGRESS NOTES
Subjective:      Patient ID: Regino Lawrence is a 74 y.o. female.    Chief Complaint: No chief complaint on file.    Referred by: No ref. 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, without long-term current use of insulin     Depression     Diabetes mellitus, type 2     Eye injury as a child     k-abrasion  od    Hyperlipidemia     Hypertension     Hypothyroidism     Immune deficiency disorder     Immune disorder     Myalgia and myositis 2012    Osteoporosis     Polyneuropathy     Renal manifestation of secondary diabetes mellitus     Sarcoidosis     Ulcer     no cancer    Urinary incontinence        Past Surgical History:   Procedure Laterality Date    CARPAL TUNNEL RELEASE      Rt wrist    CATARACT EXTRACTION W/  INTRAOCULAR LENS IMPLANT Right 2015    Dr. Aezvedo    CATARACT EXTRACTION W/  INTRAOCULAR LENS IMPLANT Left 2015    Dr. Azevedo     SECTION      CHOLECYSTECTOMY      INJECTION OF JOINT Left 3/21/2019    Procedure: Injection, Joint  fLUOROSCOPIC jOINT iNJECTION (hIP iNJECTION) LEFT ROCH BURSA AS WELL LEFT TROCHANTERIC BURSA;  Surgeon: Alfonso Richards MD;  Location: [facility];  Service: Pain Management;  Laterality: Left;  NEEDS CONSENT, DIABETIC    INJECTION OF JOINT Left 2019    Procedure: Injection, Joint FLUOROSCOPIC JOINT INJECTION (HIP INJECTION) LEFT HIP;  Surgeon: Alfonso Richards MD;  Location: [facility];  Service: Pain Management;  Laterality: Left;  NEEDS CONSENT    INJECTION OF JOINT Left 2019    Procedure: INJECTION, JOINT;  Surgeon: Alfonso Richards MD;  Location: [facility];  Service: Pain Management;  Laterality: Left;  Left Hip and Left GTB Injections    TRANSFORAMINAL EPIDURAL INJECTION OF STEROID Bilateral 12/5/2019    Procedure: INJECTION, STEROID, EPIDURAL, TRANSFORAMINAL APPROACH;  Surgeon: Alfonso Richards MD;  Location: [facility];  Service: Pain Management;  Laterality: Bilateral;  B/L TF RHIANNA L5  Consent Needed    TUBAL LIGATION         Review of patient's allergies indicates:   Allergen Reactions    Azathioprine Shortness Of Breath and Other (See Comments)     Fatigue       Current Outpatient Medications   Medication Sig Dispense Refill    ACCU-CHEK FASTCLIX LANCING DEV Kit USE AS DIRECTED. 1 each 0    ALCOHOL ANTISEPTIC PADS (ALCOHOL PREP PADS TOP)       atorvastatin (LIPITOR) 20 MG tablet Take 1 tablet (20 mg total) by mouth once daily. 90 tablet 3    blood sugar diagnostic (ACCU-CHEK SMARTVIEW TEST STRIP) Strp Use as directed to check blood sugar twice daily. 200 strip 3    blood-glucose meter kit Use as instructed 1 each 0    calcium carbonate (OS-MALCOLM) 600 mg calcium (1,500 mg) Tab Take 1 tablet by mouth 2 (two) times daily.       carvediloL (COREG) 25 MG tablet Take 1 tablet (25 mg total) by mouth 2 (two) times daily. 180 tablet 3    cloNIDine (CATAPRES) 0.3 MG tablet Take 1 tablet (0.3 mg total) by mouth 3 (three) times daily. 270 tablet 1    diaper,brief,adult,disposable Misc       epoetin german-epbx (RETACRIT) 10,000 unit/mL imjection Inject 20,000 Units into the skin every 14 (fourteen) days.      fenofibrate 160 MG Tab Take 1 tablet by mouth once daily 90 tablet 3    FLUZONE HIGH-DOSE 2019-20, PF, 180 mcg/0.5 mL Syrg PHARMACIST ADMINISTERED IMMUNIZATION ADMINISTERED AT TIME OF DISPENSING  0    folic acid (FOLVITE) 1 MG tablet Take 1 tablet by mouth once daily 90 tablet 0    gabapentin (NEURONTIN) 400 MG capsule TAKE 1 CAPSULE BY MOUTH THREE TIMES DAILY 90 capsule 1    [START ON 7/2/2020] HYDROcodone-acetaminophen (NORCO) 5-325 mg per tablet  Take 1 tablet by mouth every 6 (six) hours as needed. 90 tablet 0    HYDROcodone-acetaminophen (NORCO) 5-325 mg per tablet Take 1 tablet by mouth every 6 (six) hours as needed. 90 tablet 0    [START ON 8/2/2020] HYDROcodone-acetaminophen (NORCO) 5-325 mg per tablet Take 1 tablet by mouth every 6 (six) hours as needed. 90 tablet 0    levothyroxine (SYNTHROID) 50 MCG tablet TAKE 1 TABLET BY MOUTH ONCE DAILY BEFORE BREAKFAST 90 tablet 1    metFORMIN (GLUCOPHAGE) 1000 MG tablet Take 1 tablet (1,000 mg total) by mouth 2 (two) times daily with meals. 180 tablet 1    NIFEdipine (PROCARDIA-XL) 30 MG (OSM) 24 hr tablet Take 1 tablet (30 mg total) by mouth once daily. 90 tablet 1    oxybutynin (DITROPAN-XL) 5 MG TR24 Take 1 tablet (5 mg total) by mouth once daily. 30 tablet 11    predniSONE (DELTASONE) 5 MG tablet Take 1 tablet (5 mg total) by mouth once daily. 90 tablet 1    tiZANidine (ZANAFLEX) 2 MG tablet Take 2 tablets (4 mg total) by mouth every 8 (eight) hours as needed. 270 tablet 0     No current facility-administered medications for this visit.        Family History   Problem Relation Age of Onset    Hypertension Mother     Cataracts Mother     No Known Problems Father     Hypertension Maternal Grandmother     Glaucoma Sister     Arthritis Sister     No Known Problems Brother     No Known Problems Maternal Aunt     No Known Problems Maternal Uncle     No Known Problems Paternal Aunt     No Known Problems Paternal Uncle     No Known Problems Maternal Grandfather     No Known Problems Paternal Grandmother     No Known Problems Paternal Grandfather     Kidney failure Sister     Hepatitis Sister     Cancer Sister         bone cancer     Immunodeficiency Sister     Diabetes Son     Hypertension Son     Lupus Neg Hx     Rheum arthritis Neg Hx     Amblyopia Neg Hx     Blindness Neg Hx     Macular degeneration Neg Hx     Retinal detachment Neg Hx     Strabismus Neg Hx     Stroke Neg Hx      Thyroid disease Neg Hx     Endometrial cancer Neg Hx     Vaginal cancer Neg Hx     Cervical cancer Neg Hx        Social History     Socioeconomic History    Marital status:      Spouse name: Not on file    Number of children: Not on file    Years of education: Not on file    Highest education level: Not on file   Occupational History    Not on file   Social Needs    Financial resource strain: Not on file    Food insecurity:     Worry: Not on file     Inability: Not on file    Transportation needs:     Medical: Not on file     Non-medical: Not on file   Tobacco Use    Smoking status: Never Smoker    Smokeless tobacco: Never Used   Substance and Sexual Activity    Alcohol use: No    Drug use: No    Sexual activity: Not Currently     Partners: Male   Lifestyle    Physical activity:     Days per week: Not on file     Minutes per session: Not on file    Stress: Not on file   Relationships    Social connections:     Talks on phone: Not on file     Gets together: Not on file     Attends [employer] service: Not on file     Active member of club or organization: Not on file     Attends meetings of clubs or organizations: Not on file     Relationship status: Not on file   Other Topics Concern    Not on file   Social History Narrative    Not on file           ROS        Objective:   LMP  (LMP Unknown)   Pain Disability Index Review:  Last 3 PDI Scores 3/17/2020 10/1/2019 8/13/2019   Pain Disability Index (PDI) 70 50 64     Normocephalic.  Atraumatic.  Affect appropriate.  Breathing unlabored.  Extra ocular muscles intact.           Ortho/SPM Exam      Assessment:       No diagnosis found.      Plan:   We discussed with the patient the assessment and recommendations. The following is the plan we agreed on:        There are no diagnoses linked to this encounter.

## 2020-06-11 ENCOUNTER — PATIENT MESSAGE (OUTPATIENT)
Dept: FAMILY MEDICINE | Facility: CLINIC | Age: 75
End: 2020-06-11

## 2020-06-16 ENCOUNTER — PATIENT MESSAGE (OUTPATIENT)
Dept: RHEUMATOLOGY | Facility: CLINIC | Age: 75
End: 2020-06-16

## 2020-06-16 DIAGNOSIS — G72.9 MYOPATHY: ICD-10-CM

## 2020-06-16 DIAGNOSIS — D86.9 SARCOIDOSIS: Chronic | ICD-10-CM

## 2020-06-17 ENCOUNTER — PROCEDURE VISIT (OUTPATIENT)
Dept: OPHTHALMOLOGY | Facility: CLINIC | Age: 75
End: 2020-06-17
Attending: OPHTHALMOLOGY
Payer: MEDICARE

## 2020-06-17 VITALS — SYSTOLIC BLOOD PRESSURE: 160 MMHG | DIASTOLIC BLOOD PRESSURE: 76 MMHG | HEART RATE: 80 BPM

## 2020-06-17 DIAGNOSIS — H17.9 CORNEAL SCAR, RIGHT EYE: Primary | ICD-10-CM

## 2020-06-17 DIAGNOSIS — H04.123 DRY EYE SYNDROME, BILATERAL: ICD-10-CM

## 2020-06-17 DIAGNOSIS — H10.423 SIMPLE CHRONIC CONJUNCTIVITIS OF BOTH EYES: ICD-10-CM

## 2020-06-17 DIAGNOSIS — H34.8120 HEMISPHERIC RETINAL VEIN OCCLUSION WITH MACULAR EDEMA OF LEFT EYE: ICD-10-CM

## 2020-06-17 PROCEDURE — 67028 PR INJECT INTRAVITREAL PHARMCOLOGIC: ICD-10-PCS | Mod: HCNC,LT,S$GLB, | Performed by: OPHTHALMOLOGY

## 2020-06-17 PROCEDURE — 92012 PR EYE EXAM, EST PATIENT,INTERMED: ICD-10-PCS | Mod: 25,HCNC,S$GLB, | Performed by: OPHTHALMOLOGY

## 2020-06-17 PROCEDURE — 67028 INJECTION EYE DRUG: CPT | Mod: HCNC,LT,S$GLB, | Performed by: OPHTHALMOLOGY

## 2020-06-17 PROCEDURE — 92134 POSTERIOR SEGMENT OCT RETINA (OCULAR COHERENCE TOMOGRAPHY)-BOTH EYES: ICD-10-PCS | Mod: HCNC,S$GLB,, | Performed by: OPHTHALMOLOGY

## 2020-06-17 PROCEDURE — 92134 CPTRZ OPH DX IMG PST SGM RTA: CPT | Mod: HCNC,S$GLB,, | Performed by: OPHTHALMOLOGY

## 2020-06-17 PROCEDURE — 92012 INTRM OPH EXAM EST PATIENT: CPT | Mod: 25,HCNC,S$GLB, | Performed by: OPHTHALMOLOGY

## 2020-06-17 RX ORDER — PREDNISONE 5 MG/1
7.5 TABLET ORAL DAILY
Qty: 135 TABLET | Refills: 1 | Status: SHIPPED | OUTPATIENT
Start: 2020-06-17 | End: 2020-12-09 | Stop reason: SDUPTHER

## 2020-06-17 RX ADMIN — Medication 1.25 MG: at 04:06

## 2020-06-17 NOTE — PROGRESS NOTES
Subjective:       Patient ID: Regino Lawrence is a 74 y.o. female      Chief Complaint   Patient presents with    HRVO with ME     left eye      History of Present Illness  HPI     HRVO with ME      Additional comments: left eye               Comments     DLS 02/05/2020 by Dr. Baker      S/P avastin OS 02/05/2020    73 yo female here today for comprehensive examination, OCT/DFE and   Injection Avastin OS.     Pt states: both eyes itching and running all the time with FB sensation ,   using AT's but this does not help   Va overall stable OU.  Va OD blurry, vision OS good  No f/f/pain OU      Eye Med(s) - Artificial Tears - OU PRN      Ocular Hx    HRVO OS  Avastin injections OS     Hemoglobin A1C       Date                     Value               Ref Range             Status                05/22/2020               6.0 (H)             4.0 - 5.6 %           Final                        Last edited by Hemanth Baker MD on 6/17/2020  5:50 PM. (History)        Imaging:    See report    Assessment/Plan:     1. Hemispheric retinal vein occlusion with macular edema of left eye  Unintended extension to 4 mo interval  Still with CWS  OS is better-seeing eye  Recommend Av OS today and reeval in 4 months    - Posterior Segment OCT Retina-Both eyes    2. Corneal scar, right eye  Stable.  Observe.  Limits Va OD    3. Dry eye syndrome, bilateral  ATs    4. Simple chronic conjunctivitis of both eyes  Add Zaditor 2/2  Call if not helpful and can try Rx med    Follow up in about 4 months (around 10/17/2020), or if symptoms worsen or fail to improve, for OCT Mac, Comprehensive Examination.     Patient identified.  Timeout performed.    Risks, benefits, and alternatives to treatment were discussed in detail with the patient, including bleeding/infection (endophthalmitis)/etc.  The patient voiced understanding and wished to proceed with the procedure.  See separate consent form.    Injection Procedure Note:  Diagnosis: RVO  with ME Left Eye    Topical Proparacaine drop placed then topical 5% Betadine  Sterile gloves used, and sterile lid speculum placed.  5% Betadine placed at injection site again prior to injection.  Avastin 1.25mg in 0.05cc Injected inferotemporally 3.5-4mm posterior to the limbus.  Complications: None  Va at least CF at 5 feet post injection.  Retina, ONH, IOP normal after injection.    Followup as above.  Patient should return immediately PRN.  Retinal Detachment and Endophthalmitis precautions given.

## 2020-06-17 NOTE — PATIENT INSTRUCTIONS

## 2020-06-19 ENCOUNTER — INFUSION (OUTPATIENT)
Dept: INFUSION THERAPY | Facility: HOSPITAL | Age: 75
End: 2020-06-19
Attending: INTERNAL MEDICINE
Payer: MEDICARE

## 2020-06-19 VITALS
HEART RATE: 79 BPM | OXYGEN SATURATION: 96 % | DIASTOLIC BLOOD PRESSURE: 59 MMHG | RESPIRATION RATE: 17 BRPM | SYSTOLIC BLOOD PRESSURE: 126 MMHG | TEMPERATURE: 98 F

## 2020-06-19 DIAGNOSIS — Z53.1 REFUSAL OF BLOOD TRANSFUSIONS AS PATIENT IS JEHOVAH'S WITNESS: ICD-10-CM

## 2020-06-19 DIAGNOSIS — D50.9 IRON DEFICIENCY ANEMIA, UNSPECIFIED IRON DEFICIENCY ANEMIA TYPE: ICD-10-CM

## 2020-06-19 DIAGNOSIS — D63.1 ANEMIA IN CHRONIC KIDNEY DISEASE, UNSPECIFIED CKD STAGE: Primary | ICD-10-CM

## 2020-06-19 DIAGNOSIS — N18.9 ANEMIA IN CHRONIC KIDNEY DISEASE, UNSPECIFIED CKD STAGE: Primary | ICD-10-CM

## 2020-06-19 PROCEDURE — 63600175 PHARM REV CODE 636 W HCPCS: Mod: HCNC | Performed by: INTERNAL MEDICINE

## 2020-06-19 PROCEDURE — 96372 THER/PROPH/DIAG INJ SC/IM: CPT | Mod: HCNC

## 2020-06-19 RX ADMIN — EPOETIN ALFA-EPBX 20000 UNITS: 10000 INJECTION, SOLUTION INTRAVENOUS; SUBCUTANEOUS at 12:06

## 2020-06-22 ENCOUNTER — PATIENT MESSAGE (OUTPATIENT)
Dept: FAMILY MEDICINE | Facility: CLINIC | Age: 75
End: 2020-06-22

## 2020-06-22 DIAGNOSIS — I10 ESSENTIAL HYPERTENSION: Chronic | ICD-10-CM

## 2020-06-22 RX ORDER — CARVEDILOL 25 MG/1
25 TABLET ORAL 2 TIMES DAILY
Qty: 180 TABLET | Refills: 0 | Status: SHIPPED | OUTPATIENT
Start: 2020-06-22 | End: 2020-09-11 | Stop reason: SDUPTHER

## 2020-06-26 ENCOUNTER — LAB VISIT (OUTPATIENT)
Dept: LAB | Facility: HOSPITAL | Age: 75
End: 2020-06-26
Attending: INTERNAL MEDICINE
Payer: MEDICARE

## 2020-06-26 ENCOUNTER — LAB VISIT (OUTPATIENT)
Dept: FAMILY MEDICINE | Facility: CLINIC | Age: 75
End: 2020-06-26
Payer: MEDICARE

## 2020-06-26 DIAGNOSIS — Z01.812 PRE-PROCEDURE LAB EXAM: ICD-10-CM

## 2020-06-26 DIAGNOSIS — D63.1 ANEMIA IN CHRONIC KIDNEY DISEASE, UNSPECIFIED CKD STAGE: ICD-10-CM

## 2020-06-26 DIAGNOSIS — N18.9 CHRONIC KIDNEY DISEASE, UNSPECIFIED CKD STAGE: ICD-10-CM

## 2020-06-26 DIAGNOSIS — N18.9 ANEMIA IN CHRONIC KIDNEY DISEASE, UNSPECIFIED CKD STAGE: ICD-10-CM

## 2020-06-26 DIAGNOSIS — D63.1 ANEMIA IN CKD (CHRONIC KIDNEY DISEASE): ICD-10-CM

## 2020-06-26 DIAGNOSIS — N18.9 ANEMIA IN CKD (CHRONIC KIDNEY DISEASE): ICD-10-CM

## 2020-06-26 LAB
ERYTHROCYTE [DISTWIDTH] IN BLOOD BY AUTOMATED COUNT: 14.1 % (ref 11.5–14.5)
FERRITIN SERPL-MCNC: 209 NG/ML (ref 20–300)
HCT VFR BLD AUTO: 35.7 % (ref 37–48.5)
HGB BLD-MCNC: 11 G/DL (ref 12–16)
IMM GRANULOCYTES # BLD AUTO: 0.04 K/UL (ref 0–0.04)
IRON SERPL-MCNC: 57 UG/DL (ref 30–160)
MCH RBC QN AUTO: 31.7 PG (ref 27–31)
MCHC RBC AUTO-ENTMCNC: 30.8 G/DL (ref 32–36)
MCV RBC AUTO: 103 FL (ref 82–98)
NEUTROPHILS # BLD AUTO: 3.8 K/UL (ref 1.8–7.7)
PLATELET # BLD AUTO: 296 K/UL (ref 150–350)
PMV BLD AUTO: 9.9 FL (ref 9.2–12.9)
RBC # BLD AUTO: 3.47 M/UL (ref 4–5.4)
SATURATED IRON: 14 % (ref 20–50)
TOTAL IRON BINDING CAPACITY: 408 UG/DL (ref 250–450)
TRANSFERRIN SERPL-MCNC: 276 MG/DL (ref 200–375)
WBC # BLD AUTO: 6.42 K/UL (ref 3.9–12.7)

## 2020-06-26 PROCEDURE — 83540 ASSAY OF IRON: CPT | Mod: HCNC

## 2020-06-26 PROCEDURE — 82728 ASSAY OF FERRITIN: CPT | Mod: HCNC

## 2020-06-26 PROCEDURE — U0003 INFECTIOUS AGENT DETECTION BY NUCLEIC ACID (DNA OR RNA); SEVERE ACUTE RESPIRATORY SYNDROME CORONAVIRUS 2 (SARS-COV-2) (CORONAVIRUS DISEASE [COVID-19]), AMPLIFIED PROBE TECHNIQUE, MAKING USE OF HIGH THROUGHPUT TECHNOLOGIES AS DESCRIBED BY CMS-2020-01-R: HCPCS | Mod: HCNC

## 2020-06-26 PROCEDURE — 36415 COLL VENOUS BLD VENIPUNCTURE: CPT | Mod: HCNC,PO

## 2020-06-26 PROCEDURE — 85027 COMPLETE CBC AUTOMATED: CPT | Mod: HCNC

## 2020-06-29 ENCOUNTER — HOSPITAL ENCOUNTER (OUTPATIENT)
Facility: OTHER | Age: 75
Discharge: HOME | End: 2020-06-29
Attending: ANESTHESIOLOGY | Admitting: ANESTHESIOLOGY
Payer: MEDICARE

## 2020-06-29 VITALS
BODY MASS INDEX: 23.98 KG/M2 | HEIGHT: 61 IN | DIASTOLIC BLOOD PRESSURE: 80 MMHG | OXYGEN SATURATION: 95 % | TEMPERATURE: 98 F | WEIGHT: 127 LBS | HEART RATE: 76 BPM | SYSTOLIC BLOOD PRESSURE: 159 MMHG | RESPIRATION RATE: 16 BRPM

## 2020-06-29 DIAGNOSIS — M54.16 LUMBAR RADICULOPATHY: Primary | ICD-10-CM

## 2020-06-29 DIAGNOSIS — G89.4 CHRONIC PAIN SYNDROME: ICD-10-CM

## 2020-06-29 DIAGNOSIS — D86.9 SARCOIDOSIS: Chronic | ICD-10-CM

## 2020-06-29 DIAGNOSIS — G89.29 CHRONIC PAIN: ICD-10-CM

## 2020-06-29 DIAGNOSIS — G72.9 MYOPATHY: ICD-10-CM

## 2020-06-29 LAB — POCT GLUCOSE: 121 MG/DL (ref 70–110)

## 2020-06-29 PROCEDURE — 64483 NJX AA&/STRD TFRM EPI L/S 1: CPT | Mod: 50

## 2020-06-29 PROCEDURE — 82947 ASSAY GLUCOSE BLOOD QUANT: CPT | Mod: HCNC | Performed by: ANESTHESIOLOGY

## 2020-06-29 PROCEDURE — 64483 NJX AA&/STRD TFRM EPI L/S 1: CPT | Mod: 50,HCNC,, | Performed by: ANESTHESIOLOGY

## 2020-06-29 PROCEDURE — 25500020 PHARM REV CODE 255: Mod: HCNC | Performed by: ANESTHESIOLOGY

## 2020-06-29 PROCEDURE — 63600175 PHARM REV CODE 636 W HCPCS: Mod: HCNC | Performed by: ANESTHESIOLOGY

## 2020-06-29 PROCEDURE — 25000003 PHARM REV CODE 250: Mod: HCNC | Performed by: ANESTHESIOLOGY

## 2020-06-29 PROCEDURE — 64483 PR EPIDURAL INJ, ANES/STEROID, TRANSFORAMINAL, LUMB/SACR, SNGL LEVL: ICD-10-PCS | Mod: 50,HCNC,, | Performed by: ANESTHESIOLOGY

## 2020-06-29 RX ORDER — DEXAMETHASONE SODIUM PHOSPHATE 100 MG/10ML
INJECTION INTRAMUSCULAR; INTRAVENOUS
Status: DISCONTINUED | OUTPATIENT
Start: 2020-06-29 | End: 2020-06-29 | Stop reason: HOSPADM

## 2020-06-29 RX ORDER — FOLIC ACID 1 MG/1
1000 TABLET ORAL DAILY
Qty: 90 TABLET | Refills: 0 | Status: SHIPPED | OUTPATIENT
Start: 2020-06-29 | End: 2020-09-16 | Stop reason: SDUPTHER

## 2020-06-29 RX ORDER — LIDOCAINE HYDROCHLORIDE 10 MG/ML
INJECTION INFILTRATION; PERINEURAL
Status: DISCONTINUED | OUTPATIENT
Start: 2020-06-29 | End: 2020-06-29 | Stop reason: HOSPADM

## 2020-06-29 RX ORDER — ALPRAZOLAM 0.5 MG/1
0.5 TABLET ORAL ONCE
Status: COMPLETED | OUTPATIENT
Start: 2020-06-29 | End: 2020-06-29

## 2020-06-29 RX ORDER — LIDOCAINE HYDROCHLORIDE 10 MG/ML
INJECTION, SOLUTION EPIDURAL; INFILTRATION; INTRACAUDAL; PERINEURAL
Status: DISCONTINUED | OUTPATIENT
Start: 2020-06-29 | End: 2020-06-29 | Stop reason: HOSPADM

## 2020-06-29 RX ORDER — SODIUM CHLORIDE 9 MG/ML
500 INJECTION, SOLUTION INTRAVENOUS CONTINUOUS
Status: DISCONTINUED | OUTPATIENT
Start: 2020-06-29 | End: 2021-02-24

## 2020-06-29 RX ADMIN — ALPRAZOLAM 0.5 MG: 0.5 TABLET ORAL at 09:06

## 2020-06-29 NOTE — OP NOTE
Discharge Note  Short Stay      SUMMARY     Admit Date: 6/29/2020    Attending Physician: Alfonso Richards      Discharge Physician: Alfonso Richards      Discharge Date: 6/29/2020 9:56 AM    Procedure(s) (LRB):  INJECTION, STEROID, EPIDURAL, TRANSFORAMINAL APPROACH L5/S1 (Bilateral)    Final Diagnosis: Lumbar radiculopathy [M54.16]    Disposition: Home or self care    Patient Instructions:   Current Discharge Medication List      CONTINUE these medications which have NOT CHANGED    Details   ACCU-CHEK FASTCLIX LANCING DEV Kit USE AS DIRECTED.  Qty: 1 each, Refills: 0    Associated Diagnoses: Controlled diabetes mellitus type 2 with complications      ALCOHOL ANTISEPTIC PADS (ALCOHOL PREP PADS TOP)       atorvastatin (LIPITOR) 20 MG tablet Take 1 tablet (20 mg total) by mouth once daily.  Qty: 90 tablet, Refills: 3    Associated Diagnoses: Combined 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      blood sugar diagnostic (ACCU-CHEK SMARTVIEW TEST STRIP) Strp Use as directed to check blood sugar twice daily.  Qty: 200 strip, Refills: 3    Associated Diagnoses: Controlled type 2 diabetes mellitus with complication, without long-term current use of insulin      blood-glucose meter kit Use as instructed  Qty: 1 each, Refills: 0    Comments: AccuCheck  Associated Diagnoses: Controlled type 2 diabetes mellitus without complication, without long-term current use of insulin      calcium carbonate (OS-MALCOLM) 600 mg calcium (1,500 mg) Tab Take 1 tablet by mouth 2 (two) times daily.       carvediloL (COREG) 25 MG tablet Take 1 tablet (25 mg total) by mouth 2 (two) times daily.  Qty: 180 tablet, Refills: 0    Comments: .  Associated Diagnoses: Essential hypertension      cloNIDine (CATAPRES) 0.3 MG tablet Take 1 tablet (0.3 mg total) by mouth 3 (three) times daily.  Qty: 270 tablet, Refills: 1    Comments: .  Associated Diagnoses: Essential hypertension      diaper,brief,adult,disposable Misc       epoetin german-epbx (RETACRIT)  10,000 unit/mL imjection Inject 20,000 Units into the skin every 14 (fourteen) days.      fenofibrate 160 MG Tab Take 1 tablet by mouth once daily  Qty: 90 tablet, Refills: 3    Associated Diagnoses: Combined 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      FLUZONE HIGH-DOSE 2019-20, PF, 180 mcg/0.5 mL Syrg PHARMACIST ADMINISTERED IMMUNIZATION ADMINISTERED AT TIME OF DISPENSING  Refills: 0      folic acid (FOLVITE) 1 MG tablet Take 1 tablet by mouth once daily  Qty: 90 tablet, Refills: 0    Associated Diagnoses: Sarcoidosis; Myopathy      gabapentin (NEURONTIN) 400 MG capsule TAKE 1 CAPSULE BY MOUTH THREE TIMES DAILY  Qty: 90 capsule, Refills: 1    Associated Diagnoses: Chronic bilateral low back pain with left-sided sciatica      !! HYDROcodone-acetaminophen (NORCO) 5-325 mg per tablet Take 1 tablet by mouth every 6 (six) hours as needed.  Qty: 90 tablet, Refills: 0    Comments: Medically necessary for more than 7 days  Associated Diagnoses: Degenerative disc disease, lumbar      !! HYDROcodone-acetaminophen (NORCO) 5-325 mg per tablet Take 1 tablet by mouth every 6 (six) hours as needed.  Qty: 90 tablet, Refills: 0    Comments: Medically necessary for more than 7 days  Associated Diagnoses: Degenerative disc disease, lumbar      !! HYDROcodone-acetaminophen (NORCO) 5-325 mg per tablet Take 1 tablet by mouth every 6 (six) hours as needed.  Qty: 90 tablet, Refills: 0    Comments: Medically necessary for more than 7 days  Associated Diagnoses: Degenerative disc disease, lumbar      levothyroxine (SYNTHROID) 50 MCG tablet TAKE 1 TABLET BY MOUTH ONCE DAILY BEFORE BREAKFAST  Qty: 90 tablet, Refills: 1    Associated Diagnoses: Hypothyroidism, unspecified type      metFORMIN (GLUCOPHAGE) 1000 MG tablet Take 1 tablet (1,000 mg total) by mouth 2 (two) times daily with meals.  Qty: 180 tablet, Refills: 1    Associated Diagnoses: Diabetes mellitus, type 2; Diabetes mellitus with stage 3 chronic kidney disease       NIFEdipine (PROCARDIA-XL) 30 MG (OSM) 24 hr tablet Take 1 tablet (30 mg total) by mouth once daily.  Qty: 90 tablet, Refills: 1    Associated Diagnoses: Essential hypertension      oxybutynin (DITROPAN-XL) 5 MG TR24 Take 1 tablet (5 mg total) by mouth once daily.  Qty: 30 tablet, Refills: 11      predniSONE (DELTASONE) 5 MG tablet Take 1.5 tablets (7.5 mg total) by mouth once daily.  Qty: 135 tablet, Refills: 1    Associated Diagnoses: Sarcoidosis; Myopathy      tiZANidine (ZANAFLEX) 2 MG tablet Take 2 tablets (4 mg total) by mouth every 8 (eight) hours as needed.  Qty: 270 tablet, Refills: 0    Associated Diagnoses: Myalgia       !! - Potential duplicate medications found. Please discuss with provider.              Discharge Diagnosis: Lumbar radiculopathy [M54.16]  Condition on Discharge: Stable with no complications to procedure   Diet on Discharge: Same as before.  Activity: as per instruction sheet.  Discharge to: Home with a responsible adult.  Follow up: 2-4 weeks       Please call my office or pager at [phone] if experienced any weakness or loss of sensation, fever > 101.5, pain uncontrolled with oral medications, persistent nausea/vomiting/or diarrhea, redness or drainage from the incisions, or any other worrisome concerns. If physician on call was not reached or could not communicate with our office for any reason please go to the nearest emergency department

## 2020-06-29 NOTE — OP NOTE
Date of Service: 06/29/2020    PCP: Micaela Mendoza MD    Referring Physician:    Time-out taken to identify patient and procedure side prior to starting the procedure.   I attest that I have reviewed the patient's home medications prior to the procedure and no contraindication have been identified. I  re-evaluated the patient after the patient was positioned for the procedure in the procedure room immediately before the procedural time-out. The vital signs are current and represent the current state of the patient which has not significantly changed since the preprocedure assessment.                                                           PROCEDURE: Bilateral L5/S1 transforaminal epidural steroid injection under fluoroscopy    REASON FOR PROCEDURE: Bilateral Lumbar radiculopathy [M54.16]  1. Lumbar radiculopathy    2. Chronic pain syndrome    3. Chronic pain      POSTPROCEDURE DIAGNOSIS:   Lumbar radiculopathy [M54.16]    1. Lumbar radiculopathy    2. Chronic pain syndrome    3. Chronic pain           PHYSICIAN: Alfonso Richards MD  ASSISTANTS:Ever Resendez MD, fellow       MEDICATIONS INJECTED:  Preservative-free dexamethasone 10mg, Xylocaine 1% MPF 3-5ml. 3ml per level. Preservative free, sterile normal saline is used to get larger volume as needed.  LOCAL ANESTHETIC INJECTED:  Xylocaine 1% 9ml with Sodium Bicarbonate 1ml. 3ml per site.    SEDATION MEDICATIONS: None    ESTIMATED BLOOD LOSS:  None.    COMPLICATIONS:  None.    TECHNIQUE:   Laying in a prone position, the patient was prepped and draped in the usual sterile fashion using ChloraPrep and fenestrated drape.  The area to be injected was determined under fluoroscopic guidance.  Local anesthetic was given by raising a wheel and going down to the hub of a 27-gauge 1.25 inch needle.  The 3.5inch 22-gauge spinal needle was introduced towards the transverse process of all levels as stated above.   The needle was walked medially then hinged into the neural  foramen.  Omnipaque was injected to confirm appropriate placement and that there was no vascular runoff.  The medication was then injected after applying negative pressure. The patient tolerated the procedure well.    PAIN BEFORE THE PROCEDURE: 10/10.    PAIN AFTER THE PROCEDURE: 0/10.    The patient was monitored after the procedure.  Patient was given post procedure and discharge instructions to follow at home.  We will see the patient back in two weeks or the patient may call to inform of status. The patient was discharged in a stable condition.

## 2020-06-29 NOTE — DISCHARGE INSTRUCTIONS

## 2020-06-30 ENCOUNTER — OFFICE VISIT (OUTPATIENT)
Dept: HEMATOLOGY/ONCOLOGY | Facility: CLINIC | Age: 75
End: 2020-06-30
Payer: MEDICARE

## 2020-06-30 DIAGNOSIS — G89.4 CHRONIC PAIN SYNDROME: ICD-10-CM

## 2020-06-30 DIAGNOSIS — N18.9 CHRONIC KIDNEY DISEASE, UNSPECIFIED CKD STAGE: ICD-10-CM

## 2020-06-30 DIAGNOSIS — D63.1 ANEMIA IN CHRONIC KIDNEY DISEASE, UNSPECIFIED CKD STAGE: Primary | ICD-10-CM

## 2020-06-30 DIAGNOSIS — N18.9 ANEMIA IN CHRONIC KIDNEY DISEASE, UNSPECIFIED CKD STAGE: Primary | ICD-10-CM

## 2020-06-30 LAB — SARS-COV-2 RNA RESP QL NAA+PROBE: NOT DETECTED

## 2020-06-30 PROCEDURE — 99443 PR PHYSICIAN TELEPHONE EVALUATION 21-30 MIN: ICD-10-PCS | Mod: HCNC,95,, | Performed by: INTERNAL MEDICINE

## 2020-06-30 PROCEDURE — 99443 PR PHYSICIAN TELEPHONE EVALUATION 21-30 MIN: CPT | Mod: HCNC,95,, | Performed by: INTERNAL MEDICINE

## 2020-06-30 NOTE — PROGRESS NOTES
Subjective:     Established Patient - Audio Only Telehealth Visit     The patient location is: Home  The chief complaint leading to consultation is: Follow-up JOLLY  Visit type: Virtual visit with audio only (telephone)  Visit time: 21 min     The reason for the audio only service rather than synchronous audio and video virtual visit was related to technical difficulties or patient preference/necessity.     Each patient to whom I provide medical services by telemedicine is:  (1) informed of the relationship between the physician and patient and the respective role of any other health care provider with respect to management of the patient; and (2) notified that they may decline to receive medical services by telemedicine and may withdraw from such care at any time. Patient verbally consented to receive this service via voice-only telephone call.    This service was not originating from a related E/M service provided within the previous 7 days nor will  to an E/M service or procedure within the next 24 hours or my soonest available appointment.  Prevailing standard of care was able to be met in this audio-only visit.         Patient ID: Regino Lawrence is a 74 y.o. female.    Chief Complaint: Follow-up anemia      Diagnosis: Anemia in CKD  Patient is a [employer]  .    The patient is seen today via televisit for chronic anemia in CKD. The patient reports that she has been diagnosed with JOLLY in the past.  She has been on oral iron supplementation therapy, but could not tolerate or did not respond, she is uncertain.  She is followed by GI and has undergone a colonoscopy earlier this year and was diagnosed with hemorrhoids for which she underwent banding procedure.  No melena, hematochezia,change in bowel habits.  She has also been diagnosed with B12 deficiency in the past, but reports she did not respond to B12 injections. No history of blood transfusions.  She is a [employer].  She reports that  she remembers getting injections in the 1970s when her blood count was low.        She is followed by Rheumatology for history of sarcoidosis with associated myopathy and arthropathy.   .She has been treated in the  past with methotrexate and Plaquenil, both of which were ineffective  Cellcept and imuran caused some unknown side effect.   Colchicine  held due to low WBC     She continues with Chronic diffuse arthralgias-stable   No fevers  No cough/sob  No lightheadedness  She is undergoing epo inj q 2wks  Hb 11.0g/dL  No melena, hematochezia or change in bowel habits      She was  hospitalized 10/18/2019  She presented with SOB and dyspnea on exertion for the last 2 months.The CTA was negative for PE. She has also seen a cardiologist. Her echo/stress test in 2019 were with EF of 55% and no ischemia. She denies any leg swelling associated with this. She was treated for a possible  Sarcoidosis flare-up      She also has  undergone intermittent IV iron therapy    She is followed by pain mgmt for chronic hip pain   She was diagnosed with bursitis of hip and  underwent steroid inj  history of cervical spinal stenosis s/p posterior C3-C7 laminectomy and fusion on 11/16/15 by Dr. Conner.      CBC   reveals wbc 6420/mm3  Hb 11 g/dl Hct 35.7% Plt ct 296k      Patient was in the ED 2018 and was seen on   at [facility] for back issues  She is followed by Neurosurgery for cervical spinal stenosis s/p posterior C3-C7 laminectomy and fusion      She is followed by PCP for DM  DM well -controlled  Hba1c  6.2%       PAST MEDICAL HISTORY:  Acid reflux, alopecia, anemia, anxiety disorder, chronic  kidney disease, depression, diabetes mellitus type 2, hyperlipidemia,  hypertension, hypothyroidism, osteoporosis, sarcoidosis.    PAST SURGICAL HISTORY:  Cholecystectomy, , tubal ligation, carpal tunnel release, cataracts.    FAMILY HISTORY: Unremarkable for cancer. Significant for HTN.       Review of Systems    Constitutional: Negative for activity change, appetite change and fatigue.   HENT: Negative for hearing loss and nosebleeds.    Eyes: Negative for visual disturbance.   Respiratory: Negative for cough and shortness of breath.    Cardiovascular: Negative for chest pain and leg swelling.   Gastrointestinal: Negative for abdominal pain, constipation, diarrhea and nausea.   Genitourinary: Negative for flank pain and urgency.   Musculoskeletal: Positive for arthralgias and back pain. Negative for gait problem and joint swelling.   Skin: Negative for rash.        No petechiae, ecchymoses   Neurological: Negative for light-headedness and headaches.   Hematological: Negative for adenopathy. Does not bruise/bleed easily.       Objective:       .Televisit  PE deferred            Lab Results   Component Value Date    WBC 6.42 06/26/2020    HGB 11.0 (L) 06/26/2020    HCT 35.7 (L) 06/26/2020     (H) 06/26/2020     06/26/2020     Lab Results   Component Value Date    IRON 57 06/26/2020    TIBC 408 06/26/2020    FERRITIN 209 06/26/2020     SPEP-nl      CT renal 3/6/2017   IMPRESSION:  1.  No renal, ureteral or bladder calculi.  No hydronephrosis or ureterectasis.  2.  Poorly distended bladder.  Mild bladder wall prominence.  Mild cystitis cannot be   excluded.  3.  Moderate constipation.  Normal appendix      Lab Results   Component Value Date    IRON 57 06/26/2020    TIBC 408 06/26/2020    FERRITIN 209 06/26/2020     Lab Results   Component Value Date    WBC 6.42 06/26/2020    HGB 11.0 (L) 06/26/2020    HCT 35.7 (L) 06/26/2020     (H) 06/26/2020     06/26/2020         Assessment:       1. Anemia in chronic kidney disease, unspecified CKD stage    2. Chronic kidney disease, unspecified CKD stage    3. Patient is [employer]    4. Chronic pain syndrome        Plan:   1-4   Pt clinically stable  Pt is a Islam and declines/not interested in blood and blood products due to Bahai  beliefs    CBC   reveals wbc 6420/mm3  Hb 11 g/dl Hct 35.7% Plt ct 296k    Ferritin 209  Fe sats 14    Pt followed by Nephrology . It has been determined early CKD stage III, suspect due to age-related renal nephron loss, along with possible diabetic nephropathy v. hypertensive nephrosclerosis    CBC q2wks STANDING  Cont EPO  inj q 2wks( pending lab parameters)  Hb continues to  respond    Continue periodic monitoring of Fe studies    DM well -controlled  Hba1c  6.0%     Follow-up with PCP for med mgmt        F/u 2 mos with Fe studies    Advance Care Planning     Power of   I previously initiated the process of advance care planning today and explained the importance of this process to the patient.  I introduced the concept of advance directives to the patient, as well. Then the patient received detailed information about the importance of designating a Health Care Power of  (HCPOA). She was also instructed to communicate with this person about their wishes for future healthcare, should she become sick and lose decision-making capacity. The patient has  previously appointed a HCPOA. Pt completed in 2015           CC: Micaela Mendoza M.D.

## 2020-07-02 ENCOUNTER — PATIENT MESSAGE (OUTPATIENT)
Dept: FAMILY MEDICINE | Facility: CLINIC | Age: 75
End: 2020-07-02

## 2020-07-02 ENCOUNTER — LAB VISIT (OUTPATIENT)
Dept: LAB | Facility: HOSPITAL | Age: 75
End: 2020-07-02
Attending: INTERNAL MEDICINE
Payer: MEDICARE

## 2020-07-02 DIAGNOSIS — N18.9 ANEMIA IN CKD (CHRONIC KIDNEY DISEASE): ICD-10-CM

## 2020-07-02 DIAGNOSIS — D63.1 ANEMIA IN CKD (CHRONIC KIDNEY DISEASE): ICD-10-CM

## 2020-07-02 LAB
ERYTHROCYTE [DISTWIDTH] IN BLOOD BY AUTOMATED COUNT: 13.8 % (ref 11.5–14.5)
HCT VFR BLD AUTO: 34.9 % (ref 37–48.5)
HGB BLD-MCNC: 11 G/DL (ref 12–16)
IMM GRANULOCYTES # BLD AUTO: 0.03 K/UL (ref 0–0.04)
MCH RBC QN AUTO: 31.4 PG (ref 27–31)
MCHC RBC AUTO-ENTMCNC: 31.5 G/DL (ref 32–36)
MCV RBC AUTO: 100 FL (ref 82–98)
NEUTROPHILS # BLD AUTO: 3.4 K/UL (ref 1.8–7.7)
PLATELET # BLD AUTO: 252 K/UL (ref 150–350)
PMV BLD AUTO: 8.7 FL (ref 9.2–12.9)
RBC # BLD AUTO: 3.5 M/UL (ref 4–5.4)
WBC # BLD AUTO: 6.06 K/UL (ref 3.9–12.7)

## 2020-07-02 PROCEDURE — 85027 COMPLETE CBC AUTOMATED: CPT | Mod: HCNC

## 2020-07-02 PROCEDURE — 36415 COLL VENOUS BLD VENIPUNCTURE: CPT | Mod: HCNC

## 2020-07-09 ENCOUNTER — TELEPHONE (OUTPATIENT)
Dept: PAIN MEDICINE | Facility: CLINIC | Age: 75
End: 2020-07-09

## 2020-07-09 NOTE — TELEPHONE ENCOUNTER
Staff contacted the patient to reschedule 7/13/20 10:40 am appointment with Jenny Balbuena Np due to the provider being unavailable. At this time the appointment is cancelled and may be rescheduled by calling [phone]. Staff apologize for any inconvenience.     Patient accepted an appointment 7/14/20 9:20 am with Jenny Balbuena Np and verbalized understanding of the following:     -Please arrive 15 minutes prior to appointment time  -Please call [phone] upon arriving to building   -No Non essential visitors due to limited seating in waiting area  -Essential visitors will be seated in chairs outside of clinic until patient exam is complete.   -Do you require assistance? No assist with scooter   -Stop at temp station/ get mask if you do not have one (Ok to wear your own)

## 2020-07-12 ENCOUNTER — NURSE TRIAGE (OUTPATIENT)
Dept: ADMINISTRATIVE | Facility: CLINIC | Age: 75
End: 2020-07-12

## 2020-07-14 ENCOUNTER — OFFICE VISIT (OUTPATIENT)
Dept: PAIN MEDICINE | Facility: CLINIC | Age: 75
End: 2020-07-14
Payer: MEDICARE

## 2020-07-14 VITALS
BODY MASS INDEX: 24.18 KG/M2 | HEIGHT: 61 IN | HEART RATE: 85 BPM | SYSTOLIC BLOOD PRESSURE: 114 MMHG | TEMPERATURE: 98 F | DIASTOLIC BLOOD PRESSURE: 68 MMHG | WEIGHT: 128.06 LBS

## 2020-07-14 DIAGNOSIS — M25.552 CHRONIC HIP PAIN, LEFT: ICD-10-CM

## 2020-07-14 DIAGNOSIS — M51.36 DDD (DEGENERATIVE DISC DISEASE), LUMBAR: ICD-10-CM

## 2020-07-14 DIAGNOSIS — M47.816 LUMBAR SPONDYLOSIS: ICD-10-CM

## 2020-07-14 DIAGNOSIS — G89.29 CHRONIC HIP PAIN, LEFT: ICD-10-CM

## 2020-07-14 DIAGNOSIS — M54.16 LUMBAR RADICULOPATHY: ICD-10-CM

## 2020-07-14 DIAGNOSIS — M70.62 GREATER TROCHANTERIC BURSITIS OF LEFT HIP: ICD-10-CM

## 2020-07-14 DIAGNOSIS — G89.4 CHRONIC PAIN SYNDROME: ICD-10-CM

## 2020-07-14 DIAGNOSIS — M16.12 PRIMARY OSTEOARTHRITIS OF LEFT HIP: Primary | ICD-10-CM

## 2020-07-14 PROCEDURE — 3008F PR BODY MASS INDEX (BMI) DOCUMENTED: ICD-10-PCS | Mod: HCNC,CPTII,S$GLB, | Performed by: NURSE PRACTITIONER

## 2020-07-14 PROCEDURE — 99213 OFFICE O/P EST LOW 20 MIN: CPT | Mod: HCNC,S$GLB,, | Performed by: NURSE PRACTITIONER

## 2020-07-14 PROCEDURE — 1159F MED LIST DOCD IN RCRD: CPT | Mod: HCNC,S$GLB,, | Performed by: NURSE PRACTITIONER

## 2020-07-14 PROCEDURE — 1100F PTFALLS ASSESS-DOCD GE2>/YR: CPT | Mod: HCNC,CPTII,S$GLB, | Performed by: NURSE PRACTITIONER

## 2020-07-14 PROCEDURE — 3288F PR FALLS RISK ASSESSMENT DOCUMENTED: ICD-10-PCS | Mod: HCNC,CPTII,S$GLB, | Performed by: NURSE PRACTITIONER

## 2020-07-14 PROCEDURE — 99213 PR OFFICE/OUTPT VISIT, EST, LEVL III, 20-29 MIN: ICD-10-PCS | Mod: HCNC,S$GLB,, | Performed by: NURSE PRACTITIONER

## 2020-07-14 PROCEDURE — 1159F PR MEDICATION LIST DOCUMENTED IN MEDICAL RECORD: ICD-10-PCS | Mod: HCNC,S$GLB,, | Performed by: NURSE PRACTITIONER

## 2020-07-14 PROCEDURE — 3074F PR MOST RECENT SYSTOLIC BLOOD PRESSURE < 130 MM HG: ICD-10-PCS | Mod: HCNC,CPTII,S$GLB, | Performed by: NURSE PRACTITIONER

## 2020-07-14 PROCEDURE — 3074F SYST BP LT 130 MM HG: CPT | Mod: HCNC,CPTII,S$GLB, | Performed by: NURSE PRACTITIONER

## 2020-07-14 PROCEDURE — 1100F PR PT FALLS ASSESS DOC 2+ FALLS/FALL W/INJURY/YR: ICD-10-PCS | Mod: HCNC,CPTII,S$GLB, | Performed by: NURSE PRACTITIONER

## 2020-07-14 PROCEDURE — 99999 PR PBB SHADOW E&M-EST. PATIENT-LVL III: CPT | Mod: PBBFAC,HCNC,, | Performed by: NURSE PRACTITIONER

## 2020-07-14 PROCEDURE — 99999 PR PBB SHADOW E&M-EST. PATIENT-LVL III: ICD-10-PCS | Mod: PBBFAC,HCNC,, | Performed by: NURSE PRACTITIONER

## 2020-07-14 PROCEDURE — 3078F PR MOST RECENT DIASTOLIC BLOOD PRESSURE < 80 MM HG: ICD-10-PCS | Mod: HCNC,CPTII,S$GLB, | Performed by: NURSE PRACTITIONER

## 2020-07-14 PROCEDURE — 3288F FALL RISK ASSESSMENT DOCD: CPT | Mod: HCNC,CPTII,S$GLB, | Performed by: NURSE PRACTITIONER

## 2020-07-14 PROCEDURE — 3078F DIAST BP <80 MM HG: CPT | Mod: HCNC,CPTII,S$GLB, | Performed by: NURSE PRACTITIONER

## 2020-07-14 PROCEDURE — 1125F PR PAIN SEVERITY QUANTIFIED, PAIN PRESENT: ICD-10-PCS | Mod: HCNC,S$GLB,, | Performed by: NURSE PRACTITIONER

## 2020-07-14 PROCEDURE — 3008F BODY MASS INDEX DOCD: CPT | Mod: HCNC,CPTII,S$GLB, | Performed by: NURSE PRACTITIONER

## 2020-07-14 PROCEDURE — 1125F AMNT PAIN NOTED PAIN PRSNT: CPT | Mod: HCNC,S$GLB,, | Performed by: NURSE PRACTITIONER

## 2020-07-14 NOTE — PATIENT INSTRUCTIONS
Possible Causes of Low Back or Leg Pain    The symptoms in your back or leg may be due to pressure on a nerve. This pressure may be caused by a damaged disk or by abnormal bone growth. Either way, you may feel pain, burning, tingling, or numbness. If you have pressure on a nerve that connects to the sciatic nerve, pain may shoot down your leg.    Pressure from the disk  Constant wear and tear can weaken a disk over time and cause back pain. The disk can then be damaged by a sudden movement or injury. If its soft center begins to bulge, the disk may press on a nerve. Or the outside of the disk may tear, and the soft center may squeeze through and pinch a nerve.    Pressure from bone  As a disk wears out, the vertebrae right above and below the disk begin to touch. This can put pressure on a nerve. Often, abnormal bone (called bone spurs) grows where the vertebrae rub against each other. This can cause the foramen or the spinal canal to narrow (called stenosis) and press against a nerve.  Date Last Reviewed: 10/4/2015  © 3749-0194 [employer]. [address] [place] [address]. All rights reserved. This information is not intended as a substitute for professional medical care. Always follow your healthcare professional's instructions.        Causes of Lumbar (Low Back) Pain  Low back pain can be caused by problems with any part of the lumbar spine. A disk can herniate (push out) and press on a nerve. Vertebrae can rub against each other or slip out of place. This can irritate facet joints and nerves. It can also lead to stenosis, a narrowing of the spinal canal or foramen.  Pressure from a disk  Constant wear and tear on a disk can cause it to weaken and push outward. Part of the disk may then press on nearby nerves. There are two common types of herniated disks:  Contained means the soft nucleus is protruding outward.   Extruded means the firm annulus has torn, letting the soft center squeeze  through.     Pressure from bone  An unstable spine   With age, a disk may thin and wear out. Vertebrae above and below the disk may begin to touch. This can put pressure on nerves. It can also cause bone spurs (growths) to form where the bones rub together.    Stenosis results when bone spurs narrow the foramen or spinal canal. This also puts pressure on nerves. Slipping vertebrae can irritate nerves and joints. They can also worsen stenosis.    In some cases, vertebrae become unstable and slip forward. This is called spondylolisthesis.     Date Last Reviewed: 10/12/2015  © 0394-0249 [employer]. [address] [place] [address]. All rights reserved. This information is not intended as a substitute for professional medical care. Always follow your healthcare professional's instructions.        Back Exercises: Leg Pull    To start, lie on your back with your knees bent and feet flat on the floor. Dont press your neck or lower back to the floor. Breathe deeply. You should feel comfortable and relaxed in this position.  · Pull one knee to your chest.  · Hold for 30 to 60 seconds. Return to starting position.  · Repeat 2 times.  · Switch legs.  · For a double leg pull, pull both legs to your chest at the same time. Repeat 2 times.  For your safety, check with your healthcare provider before starting an exercise program.   Date Last Reviewed: 8/16/2015  © 1548-2362 [employer]. [address] [place] [address]. All rights reserved. This information is not intended as a substitute for professional medical care. Always follow your healthcare professional's instructions.        Back Exercises: Lower Back Rotation    To start, lie on your back with your knees bent and feet flat on the floor. Dont press your neck or lower back to the floor. Breathe deeply. You should feel comfortable and relaxed in this position.  · Drop both knees to one side. Turn your head to the other side.  Keep your shoulders flat on the floor.  · Do not push through pain.  · Hold for 20 seconds.  · Slowly switch sides.  · Repeat 2 to 5 times.  Date Last Reviewed: 10/11/2015  © 4640-7376 "[employer]. [address] [place] [address]. All rights reserved. This information is not intended as a substitute for professional medical care. Always follow your healthcare professional's instructions.        Back Exercises: Lower Back Stretch    To start, sit in a chair with your feet flat on the floor. Shift your weight slightly forward. Relax, and keep your ears, shoulders, and hips aligned.  · Sit with your feet well apart.  · Bend forward and touch the floor with the backs of your hands. Relax and let your body drop.  · Hold for 20 seconds. Return to starting position.  · Repeat 2 times.   Date Last Reviewed: 8/16/2015  © 1215-4294 "[employer]. [address] [place] [address]. All rights reserved. This information is not intended as a substitute for professional medical care. Always follow your healthcare professional's instructions.

## 2020-07-14 NOTE — PROGRESS NOTES
Chronic patient Established Note (Follow up visit)      SUBJECTIVE:    Interval History 7/14/2020:  Regino Lawrence presents to the clinic for a follow-up appointment for low back and hip pain. She is s/p bilateral L5/S1 TF RHIANNA on 6/29/2020. She reports 100% relief of her low back pain. She reports increased left hip pain. She describes this pain as constant, sharp, and stabbing in nature. Her pain is worse with sitting, walking, and laying on her left side. She denies any radicular leg pain. She continues to take Gabapentin, Zanaflex, and Norco per her PCP. She denies any other health changes. Her pain today is 10/10.     Pain Disability Index Review:  Last 3 PDI Scores 7/14/2020 6/10/2020 3/17/2020   Pain Disability Index (PDI) 61 53 70       Pain Medications:  Gabapentin  Zanaflex  Norco    Opioid Contract: yes- with PCP     report:  Reviewed and consistent with medication use as prescribed.    Pain Procedures:   10/8/2018- Left L5 TF RHIANNA  12/6/2018- Bilateral L5 TF RHIANNA  3/21/2019- Left hip and GTB injection  7/22/2019- Left hip joint injection  9/12/2019- Left hip and GTB injection   12/15/2019- Bilateral L5 TF RHIANNA  6/29/2020- Bilateral L5/S1 TF RHIANNA    Physical Therapy/Home Exercise: no    Imaging:   MRI Lumbar Spine 9/12/2018:  COMPARISON:  None.     FINDINGS:  The distal cord/conus demonstrates normal size and signal.  No evidence of osteomyelitis, marrow replacement process, or acute fracture.  No paraspinal masses.     At L1-2, there is asymmetric disc bulging to the right, resulting in mild right-sided neural foraminal narrowing.  No spinal canal stenosis.     L1-2 is unremarkable.     L2-3 demonstrates mild disc bulging slightly asymmetric to the left resulting in mild left-sided neural foraminal narrowing.  No spinal canal stenosis.     L4-5 demonstrates minimal anterolisthesis anterolisthesis.  There is mild facet arthropathy and disc bulging.  This results in mild left-sided neural foraminal  narrowing.  No spinal canal stenosis.     At L5-S1, there is a moderate sized left lateral recess/foraminal disc extrusion effacing the left neural foramen, likely impinging upon the exiting left L5 nerve root.  There is left-sided degenerative disc disease, noting disc height loss and sub endplate marrow edema.  There is moderate bilateral facet arthropathy.  No spinal canal stenosis.     IMPRESSION:      Moderate size left foraminal disc extrusion at L5-S1, likely impinging upon the exiting left L5 nerve root.     Additional lumbar spondylosis, resulting in mild neural foraminal narrowing at L1-2, L2-3, and L4-5, as above.  No spinal canal stenosis.    Allergies:   Review of patient's allergies indicates:   Allergen Reactions    Azathioprine Shortness Of Breath and Other (See Comments)     Fatigue       Current Medications:   Current Outpatient Medications   Medication Sig Dispense Refill    ACCU-CHEK FASTCLIX LANCING DEV Kit USE AS DIRECTED. 1 each 0    ALCOHOL ANTISEPTIC PADS (ALCOHOL PREP PADS TOP)       atorvastatin (LIPITOR) 20 MG tablet Take 1 tablet (20 mg total) by mouth once daily. 90 tablet 3    blood sugar diagnostic (ACCU-CHEK SMARTVIEW TEST STRIP) Strp Use as directed to check blood sugar twice daily. 200 strip 3    blood-glucose meter kit Use as instructed 1 each 0    carvediloL (COREG) 25 MG tablet Take 1 tablet (25 mg total) by mouth 2 (two) times daily. 180 tablet 0    cloNIDine (CATAPRES) 0.3 MG tablet Take 1 tablet (0.3 mg total) by mouth 3 (three) times daily. 270 tablet 1    diaper,brief,adult,disposable Misc       epoetin german-epbx (RETACRIT) 10,000 unit/mL imjection Inject 20,000 Units into the skin every 14 (fourteen) days.      fenofibrate 160 MG Tab Take 1 tablet by mouth once daily 90 tablet 3    FLUZONE HIGH-DOSE 2019-20, PF, 180 mcg/0.5 mL Syrg PHARMACIST ADMINISTERED IMMUNIZATION ADMINISTERED AT TIME OF DISPENSING  0    folic acid (FOLVITE) 1 MG tablet Take 1 tablet  (1,000 mcg total) by mouth once daily. 90 tablet 0    gabapentin (NEURONTIN) 400 MG capsule TAKE 1 CAPSULE BY MOUTH THREE TIMES DAILY 90 capsule 1    HYDROcodone-acetaminophen (NORCO) 5-325 mg per tablet Take 1 tablet by mouth every 6 (six) hours as needed. 90 tablet 0    levothyroxine (SYNTHROID) 50 MCG tablet TAKE 1 TABLET BY MOUTH ONCE DAILY BEFORE BREAKFAST 90 tablet 1    metFORMIN (GLUCOPHAGE) 1000 MG tablet Take 1 tablet (1,000 mg total) by mouth 2 (two) times daily with meals. 180 tablet 1    NIFEdipine (PROCARDIA-XL) 30 MG (OSM) 24 hr tablet Take 1 tablet (30 mg total) by mouth once daily. 90 tablet 1    predniSONE (DELTASONE) 5 MG tablet Take 1.5 tablets (7.5 mg total) by mouth once daily. 135 tablet 1    tiZANidine (ZANAFLEX) 2 MG tablet Take 2 tablets (4 mg total) by mouth every 8 (eight) hours as needed. 270 tablet 0    calcium carbonate (OS-MALCOLM) 600 mg calcium (1,500 mg) Tab Take 1 tablet by mouth 2 (two) times daily.       oxybutynin (DITROPAN-XL) 5 MG TR24 Take 1 tablet (5 mg total) by mouth once daily. 30 tablet 11     No current facility-administered medications for this visit.      Facility-Administered Medications Ordered in Other Visits   Medication Dose Route Frequency Provider Last Rate Last Dose    0.9%  NaCl infusion  500 mL Intravenous Continuous Ever Resendez MD           REVIEW OF SYSTEMS:    GENERAL:  No weight loss, malaise or fevers.  HEENT:  Negative for frequent or significant headaches.  NECK:  Negative for lumps, goiter, pain and significant neck swelling.  RESPIRATORY:  Negative for cough, wheezing or shortness of breath.  CARDIOVASCULAR:  Negative for chest pain, leg swelling or palpitations. HTN  GI:  Negative for abdominal discomfort, blood in stools or black stools or change in bowel habits.  RENAL: CKD  MUSCULOSKELETAL:  See HPI.  SKIN:  Negative for lesions, rash, and itching.  PSYCH:  Negative for sleep disturbance, mood disorder and recent psychosocial  stressors.  HEMATOLOGY/LYMPHOLOGY:  Negative for prolonged bleeding, bruising easily or swollen nodes.  NEURO:   No history of headaches, syncope, paralysis, seizures or tremors.  ENDO: Diabetes, thyroid disorder.   All other reviewed and negative other than HPI.    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, without long-term current use of insulin     Depression     Diabetes mellitus, type 2     Eye injury as a child     k-abrasion  od    Hyperlipidemia     Hypertension     Hypothyroidism     Immune deficiency disorder     Immune disorder     Myalgia and myositis 2012    Osteoporosis     Polyneuropathy     Renal manifestation of secondary diabetes mellitus     Sarcoidosis     Ulcer     no cancer    Urinary incontinence        Past Surgical History:  Past Surgical History:   Procedure Laterality Date    CARPAL TUNNEL RELEASE      Rt wrist    CATARACT EXTRACTION W/  INTRAOCULAR LENS IMPLANT Right 2015    Dr. Azevedo    CATARACT EXTRACTION W/  INTRAOCULAR LENS IMPLANT Left 2015    Dr. Azevedo     SECTION      CHOLECYSTECTOMY      INJECTION OF JOINT Left 3/21/2019    Procedure: Injection, Joint  fLUOROSCOPIC jOINT iNJECTION (hIP iNJECTION) LEFT ROCH BURSA AS WELL LEFT TROCHANTERIC BURSA;  Surgeon: Alfonso Richards MD;  Location: [facility];  Service: Pain Management;  Laterality: Left;  NEEDS CONSENT, DIABETIC    INJECTION OF JOINT Left 2019    Procedure: Injection, Joint FLUOROSCOPIC JOINT INJECTION (HIP INJECTION) LEFT HIP;  Surgeon: Alfonso Richards MD;  [facility];  Service: Pain Management;  Laterality: Left;  NEEDS  CONSENT    INJECTION OF JOINT Left 9/12/2019    Procedure: INJECTION, JOINT;  Surgeon: Alfonso Richards MD;  Location: [facility];  Service: Pain Management;  Laterality: Left;  Left Hip and Left GTB Injections    TRANSFORAMINAL EPIDURAL INJECTION OF STEROID Bilateral 12/5/2019    Procedure: INJECTION, STEROID, EPIDURAL, TRANSFORAMINAL APPROACH;  Surgeon: Alfonso Richards MD;  Location: [facility];  Service: Pain Management;  Laterality: Bilateral;  B/L TF RHIANNA L5  Consent Needed    TRANSFORAMINAL EPIDURAL INJECTION OF STEROID Bilateral 6/29/2020    Procedure: INJECTION, STEROID, EPIDURAL, TRANSFORAMINAL APPROACH L5/S1;  Surgeon: Alfonso Richards MD;  Location: [facility];  Service: Pain Management;  Laterality: Bilateral;  B/L TF RHIANNA L5/S1    TUBAL LIGATION         Family History:  Family History   Problem Relation Age of Onset    Hypertension Mother     Cataracts Mother     No Known Problems Father     Hypertension Maternal Grandmother     Glaucoma Sister     Arthritis Sister     No Known Problems Brother     No Known Problems Maternal Aunt     No Known Problems Maternal Uncle     No Known Problems Paternal Aunt     No Known Problems Paternal Uncle     No Known Problems Maternal Grandfather     No Known Problems Paternal Grandmother     No Known Problems Paternal Grandfather     Kidney failure Sister     Hepatitis Sister     Cancer Sister         bone cancer     Immunodeficiency Sister     Diabetes Son     Hypertension Son     Lupus Neg Hx     Rheum arthritis Neg Hx     Amblyopia Neg Hx     Blindness Neg Hx     Macular degeneration Neg Hx     Retinal detachment Neg Hx     Strabismus Neg Hx     Stroke Neg Hx     Thyroid disease Neg Hx     Endometrial cancer Neg Hx     Vaginal cancer Neg Hx     Cervical cancer Neg Hx        Social History:  Social History     Socioeconomic History    Marital status:      Spouse name: Not on file    Number of children: Not on file    Years of  "education: Not on file    Highest education level: Not on file   Occupational History    Not on file   Social Needs    Financial resource strain: Not on file    Food insecurity     Worry: Not on file     Inability: Not on file    Transportation needs     Medical: Not on file     Non-medical: Not on file   Tobacco Use    Smoking status: Never Smoker    Smokeless tobacco: Never Used   Substance and Sexual Activity    Alcohol use: No    Drug use: No    Sexual activity: Not Currently     Partners: Male   Lifestyle    Physical activity     Days per week: Not on file     Minutes per session: Not on file    Stress: Not on file   Relationships    Social connections     Talks on phone: Not on file     Gets together: Not on file     Attends Restorationist service: Not on file     Active member of club or organization: Not on file     Attends meetings of clubs or organizations: Not on file     Relationship status: Not on file   Other Topics Concern    Not on file   Social History Narrative    Not on file       OBJECTIVE:    /68 (BP Location: Right arm, Patient Position: Sitting)   Pulse 85   Temp 98.2 °F (36.8 °C) (Oral)   Ht 5' 1" (1.549 m)   Wt 58.1 kg (128 lb 1.4 oz)   LMP  (LMP Unknown)   BMI 24.20 kg/m²     PHYSICAL EXAMINATION:    General appearance: Well appearing, in no acute distress, alert and oriented x3.  Psych:  Mood and affect appropriate.  Skin: Skin color, texture, turgor normal, no rashes or lesions, in both upper and lower body.  Head/face:  Atraumatic, normocephalic. No palpable lymph nodes  Cor: RRR  Pulm: CTA  GI: Abdomen soft and non-tender.  Back: Straight leg raising in the sitting position is negative to radicular pain bilaterally. There is mild pain with palpation over lumbar facet joints. Limited ROM with mild pain on extension. Positive facet loading bilaterally.   Extremities: Peripheral joint ROM is full and pain free without obvious instability or laxity in all four " extremities. No deformities, edema, or skin discoloration. Good capillary refill.  Musculoskeletal: Shoulder and knee provocative maneuvers are negative. There is pain with internal and external rotation of left hip. Mild pain with palpation over left SI joint. FABERs is positive on the left. There is pain with palpation over left GTB. Bilateral lower extremity strength is normal and symmetric.  No atrophy or tone abnormalities are noted.  Neuro: Bilateral lower extremity coordination and muscle stretch reflexes are physiologic and symmetric.  Plantar response are downgoing. No loss of sensation is noted.  Gait: Antalgic- ambulates with electric scooter.     ASSESSMENT: 74 y.o. year old female with low back and hip pain, consistent with the followin. Primary osteoarthritis of left hip     2. Chronic hip pain, left     3. Greater trochanteric bursitis of left hip     4. Lumbar radiculopathy     5. Lumbar spondylosis     6. DDD (degenerative disc disease), lumbar     7. Chronic pain syndrome           PLAN:     - Previous imaging was reviewed and discussed with the patient today.    - Schedule for left hip and GTB injection.     - She is s/p bilateral L5/S1 TF RHIANNA with benefit. We can repeat this as needed.     - Continue current medications.     - I have stressed the importance of physical activity and a home exercise plan to help with pain and improve health.    - Provided stretches for patient today.     - RTC 2 weeks after above procedure.     - Counseled patient regarding the importance of activity modification and constant sleeping habits.    - Dr. Richards was consulted on the patient and agrees with this plan.    The above plan and management options were discussed at length with patient. Patient is in agreement with the above and verbalized understanding.    Jenny Balbuena NP  2020

## 2020-07-17 ENCOUNTER — INFUSION (OUTPATIENT)
Dept: INFUSION THERAPY | Facility: HOSPITAL | Age: 75
End: 2020-07-17
Attending: INTERNAL MEDICINE
Payer: MEDICARE

## 2020-07-17 VITALS
DIASTOLIC BLOOD PRESSURE: 63 MMHG | OXYGEN SATURATION: 100 % | HEART RATE: 84 BPM | TEMPERATURE: 98 F | RESPIRATION RATE: 18 BRPM | SYSTOLIC BLOOD PRESSURE: 130 MMHG

## 2020-07-17 DIAGNOSIS — D63.1 ANEMIA IN CHRONIC KIDNEY DISEASE, UNSPECIFIED CKD STAGE: Primary | ICD-10-CM

## 2020-07-17 DIAGNOSIS — N18.9 ANEMIA IN CHRONIC KIDNEY DISEASE, UNSPECIFIED CKD STAGE: Primary | ICD-10-CM

## 2020-07-17 DIAGNOSIS — Z53.1 REFUSAL OF BLOOD TRANSFUSIONS AS PATIENT IS JEHOVAH'S WITNESS: ICD-10-CM

## 2020-07-17 PROCEDURE — 63600175 PHARM REV CODE 636 W HCPCS: Mod: HCNC | Performed by: INTERNAL MEDICINE

## 2020-07-17 PROCEDURE — 96372 THER/PROPH/DIAG INJ SC/IM: CPT | Mod: HCNC

## 2020-07-17 RX ADMIN — EPOETIN ALFA-EPBX 20000 UNITS: 10000 INJECTION, SOLUTION INTRAVENOUS; SUBCUTANEOUS at 11:07

## 2020-07-17 NOTE — PLAN OF CARE
Pt arrived to unit in motorized chair. VSS. Pt afebrile. Pt has abd ascites and pedal edema +2. Seeing MD next week. Tolerated Retacrit injection. Pt has next appt. Pt discharged from unit in motorized chair. No distress noted.

## 2020-07-21 ENCOUNTER — PES CALL (OUTPATIENT)
Dept: ADMINISTRATIVE | Facility: CLINIC | Age: 75
End: 2020-07-21

## 2020-07-27 ENCOUNTER — HOSPITAL ENCOUNTER (OUTPATIENT)
Facility: OTHER | Age: 75
Discharge: HOME | End: 2020-07-27
Attending: ANESTHESIOLOGY | Admitting: ANESTHESIOLOGY
Payer: MEDICARE

## 2020-07-27 VITALS
OXYGEN SATURATION: 98 % | BODY MASS INDEX: 24.17 KG/M2 | SYSTOLIC BLOOD PRESSURE: 197 MMHG | DIASTOLIC BLOOD PRESSURE: 93 MMHG | RESPIRATION RATE: 18 BRPM | HEART RATE: 80 BPM | WEIGHT: 128 LBS | TEMPERATURE: 98 F | HEIGHT: 61 IN

## 2020-07-27 DIAGNOSIS — M16.12 PRIMARY OSTEOARTHRITIS OF LEFT HIP: Primary | ICD-10-CM

## 2020-07-27 DIAGNOSIS — M16.9 OSTEOARTHRITIS OF HIP: ICD-10-CM

## 2020-07-27 LAB — POCT GLUCOSE: 188 MG/DL (ref 70–110)

## 2020-07-27 PROCEDURE — 77002 NEEDLE LOCALIZATION BY XRAY: CPT | Mod: 26,HCNC,, | Performed by: ANESTHESIOLOGY

## 2020-07-27 PROCEDURE — 25000003 PHARM REV CODE 250: Mod: HCNC | Performed by: ANESTHESIOLOGY

## 2020-07-27 PROCEDURE — 82947 ASSAY GLUCOSE BLOOD QUANT: CPT | Mod: HCNC | Performed by: ANESTHESIOLOGY

## 2020-07-27 PROCEDURE — 25500020 PHARM REV CODE 255: Mod: HCNC | Performed by: ANESTHESIOLOGY

## 2020-07-27 PROCEDURE — 27093 PR INJECTION HIP ARTHROGRAM: ICD-10-PCS | Mod: HCNC,LT,, | Performed by: ANESTHESIOLOGY

## 2020-07-27 PROCEDURE — 63600175 PHARM REV CODE 636 W HCPCS: Mod: HCNC | Performed by: ANESTHESIOLOGY

## 2020-07-27 PROCEDURE — 27093 INJECTION FOR HIP X-RAY: CPT | Mod: HCNC,LT,, | Performed by: ANESTHESIOLOGY

## 2020-07-27 PROCEDURE — 77002 PR FLUOROSCOPIC GUIDANCE NEEDLE PLACEMENT: ICD-10-PCS | Mod: 26,HCNC,, | Performed by: ANESTHESIOLOGY

## 2020-07-27 PROCEDURE — 27093 INJECTION FOR HIP X-RAY: CPT | Mod: HCNC,LT | Performed by: ANESTHESIOLOGY

## 2020-07-27 PROCEDURE — 77002 NEEDLE LOCALIZATION BY XRAY: CPT | Mod: HCNC | Performed by: ANESTHESIOLOGY

## 2020-07-27 PROCEDURE — 20610 DRAIN/INJ JOINT/BURSA W/O US: CPT | Performed by: ANESTHESIOLOGY

## 2020-07-27 RX ORDER — LIDOCAINE HYDROCHLORIDE 10 MG/ML
INJECTION INFILTRATION; PERINEURAL
Status: DISCONTINUED | OUTPATIENT
Start: 2020-07-27 | End: 2020-07-27 | Stop reason: HOSPADM

## 2020-07-27 RX ORDER — ALPRAZOLAM 0.5 MG/1
0.5 TABLET ORAL ONCE
Status: COMPLETED | OUTPATIENT
Start: 2020-07-27 | End: 2020-07-27

## 2020-07-27 RX ORDER — SODIUM CHLORIDE 9 MG/ML
500 INJECTION, SOLUTION INTRAVENOUS CONTINUOUS
Status: DISCONTINUED | OUTPATIENT
Start: 2020-07-27 | End: 2020-07-27 | Stop reason: HOSPADM

## 2020-07-27 RX ORDER — BUPIVACAINE HYDROCHLORIDE 2.5 MG/ML
INJECTION, SOLUTION EPIDURAL; INFILTRATION; INTRACAUDAL
Status: DISCONTINUED | OUTPATIENT
Start: 2020-07-27 | End: 2020-07-27 | Stop reason: HOSPADM

## 2020-07-27 RX ORDER — TRIAMCINOLONE ACETONIDE 40 MG/ML
INJECTION, SUSPENSION INTRA-ARTICULAR; INTRAMUSCULAR
Status: DISCONTINUED | OUTPATIENT
Start: 2020-07-27 | End: 2020-07-27 | Stop reason: HOSPADM

## 2020-07-27 RX ADMIN — ALPRAZOLAM 0.5 MG: 0.5 TABLET ORAL at 10:07

## 2020-07-27 NOTE — DISCHARGE INSTRUCTIONS

## 2020-07-27 NOTE — OP NOTE
Hip Injection/Arthrogram LATERAL APPROACH  Time-out taken to identify patient and procedure side prior to starting the procedure.   I attest that I have reviewed the patient's home medications prior to the procedure and no contraindication have been identified. I  re-evaluated the patient after the patient was positioned for the procedure in the procedure room immediately before the procedural time-out. The vital signs are current and represent the current state of the patient which has not significantly changed since the preprocedure assessment.                                                                                                            Date of Service: 07/27/2020    PCP: Micaela Mendoza MD    Referring Physician:                                                                                                PROCEDURE:  left hip joint injection under fluoroscopy.    REASON FOR PROCEDURE: left hip Chronic left hip pain [M25.552, G89.29]  Greater trochanteric bursitis of left hip [M70.62]     1. Primary osteoarthritis of left hip    2. Osteoarthritis of hip      POSTOP DIAGNOSIS: left hip Chronic left hip pain [M25.552, G89.29]  Greater trochanteric bursitis of left hip [M70.62]     1. Primary osteoarthritis of left hip    2. Osteoarthritis of hip        PHYSICIAN: Alfonso Richards MD  ASSISTANTS: Vivian Tucker DO ([facility])  Demetris Gonzalez MD Resident                                                                                     ANESTHESIA:  Xylocaine 1% 9ml with Sodium Bicarbonate 1ml. 3ml per site.                                                                                                              MEDICATIONS INJECTED:  Kenalog 20mg, bupivacaine 0.25% 2 mL (per side), and sterile saline 2ml (per side)                                                                                                                                     ESTIMATED BLOOD LOSS:  None.                                                                                                                               COMPLICATIONS:  None.        SEDATION: None                                                                                                                                 TECHNIQUE:  With the patient lying in the prone position the same side greater    trochanter was palpated.  The area was prepped and draped in the usual       sterile fashion.  Local anesthetic was used, given by raising a wheal and    going down to the hub of the 27-gauge needle.  A 3-1/2 inch 22-gauge         needle was introduced under fluoroscopy until the tip reached the greater    trochanter.  The tip of the needle was hinged cephalad from the greater        trochanter into the joint space.  When the tip of the needle was thought     to be in appropriate position, contrast was injected to confirm proper placement.  Medication was then injected slowly.  The patient tolerated the procedure well.                                                                                                                     PAIN BEFORE THE PROCEDURE: 10/10.                                                                                                                         PAIN AFTER THE PROCEDURE: 0/10.                                                                                                                          The patient was monitored for 20-30 minutes after the procedure and was      given post procedure and discharge instructions to follow at home.  The      patient is to follow-up with me in two weeks by calling.   The patient was discharged in a stable condtion.

## 2020-07-27 NOTE — DISCHARGE SUMMARY
Discharge Note  Short Stay      SUMMARY     Admit Date: 7/27/2020    Attending Physician: Alfonso Richards      Discharge Physician: Alfonso Richards      Discharge Date: 7/27/2020 10:50 AM    Procedure(s) (LRB):  INJECTION, JOINT, LEFT HIP and LEFT GREATER TROCHANTERIC BURSA (Left)    Final Diagnosis: Chronic left hip pain [M25.552, G89.29]  Greater trochanteric bursitis of left hip [M70.62]    Disposition: Home or self care    Patient Instructions:   Current Discharge Medication List      CONTINUE these medications which have NOT CHANGED    Details   ACCU-CHEK FASTCLIX LANCING DEV Kit USE AS DIRECTED.  Qty: 1 each, Refills: 0    Associated Diagnoses: Controlled diabetes mellitus type 2 with complications      ALCOHOL ANTISEPTIC PADS (ALCOHOL PREP PADS TOP)       atorvastatin (LIPITOR) 20 MG tablet Take 1 tablet (20 mg total) by mouth once daily.  Qty: 90 tablet, Refills: 3    Associated Diagnoses: Combined 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      blood sugar diagnostic (ACCU-CHEK SMARTVIEW TEST STRIP) Strp Use as directed to check blood sugar twice daily.  Qty: 200 strip, Refills: 3    Associated Diagnoses: Controlled type 2 diabetes mellitus with complication, without long-term current use of insulin      blood-glucose meter kit Use as instructed  Qty: 1 each, Refills: 0    Comments: AccuCheck  Associated Diagnoses: Controlled type 2 diabetes mellitus without complication, without long-term current use of insulin      calcium carbonate (OS-MALCOLM) 600 mg calcium (1,500 mg) Tab Take 1 tablet by mouth 2 (two) times daily.       carvediloL (COREG) 25 MG tablet Take 1 tablet (25 mg total) by mouth 2 (two) times daily.  Qty: 180 tablet, Refills: 0    Comments: .  Associated Diagnoses: Essential hypertension      cloNIDine (CATAPRES) 0.3 MG tablet Take 1 tablet (0.3 mg total) by mouth 3 (three) times daily.  Qty: 270 tablet, Refills: 1    Comments: .  Associated Diagnoses: Essential hypertension       diaper,brief,adult,disposable Misc       epoetin german-epbx (RETACRIT) 10,000 unit/mL imjection Inject 20,000 Units into the skin every 14 (fourteen) days.      fenofibrate 160 MG Tab Take 1 tablet by mouth once daily  Qty: 90 tablet, Refills: 3    Associated Diagnoses: Combined 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      FLUZONE HIGH-DOSE 2019-20, PF, 180 mcg/0.5 mL Syrg PHARMACIST ADMINISTERED IMMUNIZATION ADMINISTERED AT TIME OF DISPENSING  Refills: 0      folic acid (FOLVITE) 1 MG tablet Take 1 tablet (1,000 mcg total) by mouth once daily.  Qty: 90 tablet, Refills: 0    Associated Diagnoses: Sarcoidosis; Myopathy      gabapentin (NEURONTIN) 400 MG capsule TAKE 1 CAPSULE BY MOUTH THREE TIMES DAILY  Qty: 90 capsule, Refills: 1    Associated Diagnoses: Chronic bilateral low back pain with left-sided sciatica      HYDROcodone-acetaminophen (NORCO) 5-325 mg per tablet Take 1 tablet by mouth every 6 (six) hours as needed.  Qty: 90 tablet, Refills: 0    Comments: Medically necessary for more than 7 days  Associated Diagnoses: Degenerative disc disease, lumbar      levothyroxine (SYNTHROID) 50 MCG tablet TAKE 1 TABLET BY MOUTH ONCE DAILY BEFORE BREAKFAST  Qty: 90 tablet, Refills: 1    Associated Diagnoses: Hypothyroidism, unspecified type      metFORMIN (GLUCOPHAGE) 1000 MG tablet Take 1 tablet (1,000 mg total) by mouth 2 (two) times daily with meals.  Qty: 180 tablet, Refills: 1    Associated Diagnoses: Diabetes mellitus, type 2; Diabetes mellitus with stage 3 chronic kidney disease      NIFEdipine (PROCARDIA-XL) 30 MG (OSM) 24 hr tablet Take 1 tablet (30 mg total) by mouth once daily.  Qty: 90 tablet, Refills: 1    Associated Diagnoses: Essential hypertension      oxybutynin (DITROPAN-XL) 5 MG TR24 Take 1 tablet (5 mg total) by mouth once daily.  Qty: 30 tablet, Refills: 11      predniSONE (DELTASONE) 5 MG tablet Take 1.5 tablets (7.5 mg total) by mouth once daily.  Qty: 135 tablet, Refills: 1     Associated Diagnoses: Sarcoidosis; Myopathy      tiZANidine (ZANAFLEX) 2 MG tablet Take 2 tablets (4 mg total) by mouth every 8 (eight) hours as needed.  Qty: 270 tablet, Refills: 0    Associated Diagnoses: Myalgia                 Discharge Diagnosis: Chronic left hip pain [M25.552, G89.29]  Greater trochanteric bursitis of left hip [M70.62]  Condition on Discharge: Stable with no complications to procedure   Diet on Discharge: Same as before.  Activity: as per instruction sheet.  Discharge to: Home with a responsible adult.  Follow up: 2-4 weeks       Please call my office or pager at [phone] if experienced any weakness or loss of sensation, fever > 101.5, pain uncontrolled with oral medications, persistent nausea/vomiting/or diarrhea, redness or drainage from the incisions, or any other worrisome concerns. If physician on call was not reached or could not communicate with our office for any reason please go to the nearest emergency department

## 2020-07-28 DIAGNOSIS — D64.9 ANEMIA: Primary | ICD-10-CM

## 2020-07-31 ENCOUNTER — INFUSION (OUTPATIENT)
Dept: INFUSION THERAPY | Facility: HOSPITAL | Age: 75
End: 2020-07-31
Attending: INTERNAL MEDICINE
Payer: MEDICARE

## 2020-07-31 VITALS
DIASTOLIC BLOOD PRESSURE: 69 MMHG | OXYGEN SATURATION: 100 % | TEMPERATURE: 98 F | HEART RATE: 80 BPM | RESPIRATION RATE: 18 BRPM | SYSTOLIC BLOOD PRESSURE: 139 MMHG

## 2020-07-31 DIAGNOSIS — Z53.1 REFUSAL OF BLOOD TRANSFUSIONS AS PATIENT IS JEHOVAH'S WITNESS: ICD-10-CM

## 2020-07-31 DIAGNOSIS — D63.1 ANEMIA IN CHRONIC KIDNEY DISEASE, UNSPECIFIED CKD STAGE: Primary | ICD-10-CM

## 2020-07-31 DIAGNOSIS — D50.9 IRON DEFICIENCY ANEMIA, UNSPECIFIED IRON DEFICIENCY ANEMIA TYPE: ICD-10-CM

## 2020-07-31 DIAGNOSIS — N18.9 ANEMIA IN CHRONIC KIDNEY DISEASE, UNSPECIFIED CKD STAGE: Primary | ICD-10-CM

## 2020-07-31 PROCEDURE — 63600175 PHARM REV CODE 636 W HCPCS: Mod: HCNC | Performed by: INTERNAL MEDICINE

## 2020-07-31 PROCEDURE — 96372 THER/PROPH/DIAG INJ SC/IM: CPT | Mod: HCNC

## 2020-07-31 RX ADMIN — EPOETIN ALFA-EPBX 20000 UNITS: 10000 INJECTION, SOLUTION INTRAVENOUS; SUBCUTANEOUS at 11:07

## 2020-07-31 NOTE — PLAN OF CARE
Hgb 10.8 Patient received RETAcrit 20,000 units. Tolerated injection well. VSS. Reports increased fatigue and generalized body aches. She received discharge instructions and follow up appointments. She will return in 2 weeks for routine labs and retacrit pending results. Patient verbalized understanding and discharged from unit via mobile scooter by self. Patient in NAD at time of discharge.

## 2020-08-05 ENCOUNTER — OFFICE VISIT (OUTPATIENT)
Dept: FAMILY MEDICINE | Facility: CLINIC | Age: 75
End: 2020-08-05
Payer: MEDICARE

## 2020-08-05 VITALS
HEART RATE: 81 BPM | TEMPERATURE: 97 F | BODY MASS INDEX: 23.35 KG/M2 | DIASTOLIC BLOOD PRESSURE: 80 MMHG | OXYGEN SATURATION: 98 % | HEIGHT: 61 IN | SYSTOLIC BLOOD PRESSURE: 158 MMHG | WEIGHT: 123.69 LBS

## 2020-08-05 DIAGNOSIS — Z98.1 S/P CERVICAL SPINAL FUSION: ICD-10-CM

## 2020-08-05 DIAGNOSIS — R06.09 DOE (DYSPNEA ON EXERTION): ICD-10-CM

## 2020-08-05 DIAGNOSIS — G89.29 CHRONIC BILATERAL LOW BACK PAIN WITH LEFT-SIDED SCIATICA: ICD-10-CM

## 2020-08-05 DIAGNOSIS — G72.9 MYOPATHY: ICD-10-CM

## 2020-08-05 DIAGNOSIS — R53.81 DEBILITY: ICD-10-CM

## 2020-08-05 DIAGNOSIS — E11.9 DM TYPE 2 WITHOUT RETINOPATHY: ICD-10-CM

## 2020-08-05 DIAGNOSIS — E11.69 COMBINED HYPERLIPIDEMIA ASSOCIATED WITH TYPE 2 DIABETES MELLITUS: Chronic | ICD-10-CM

## 2020-08-05 DIAGNOSIS — M16.12 PRIMARY OSTEOARTHRITIS OF LEFT HIP: ICD-10-CM

## 2020-08-05 DIAGNOSIS — M51.36 DEGENERATIVE DISC DISEASE, LUMBAR: ICD-10-CM

## 2020-08-05 DIAGNOSIS — E03.9 HYPOTHYROIDISM, UNSPECIFIED TYPE: Chronic | ICD-10-CM

## 2020-08-05 DIAGNOSIS — G62.9 POLYNEUROPATHY: ICD-10-CM

## 2020-08-05 DIAGNOSIS — R53.83 FATIGUE, UNSPECIFIED TYPE: ICD-10-CM

## 2020-08-05 DIAGNOSIS — M54.6 CHRONIC BILATERAL THORACIC BACK PAIN: ICD-10-CM

## 2020-08-05 DIAGNOSIS — E11.3292 CONTROLLED TYPE 2 DIABETES MELLITUS WITH LEFT EYE AFFECTED BY MILD NONPROLIFERATIVE RETINOPATHY WITHOUT MACULAR EDEMA, WITHOUT LONG-TERM CURRENT USE OF INSULIN: Chronic | ICD-10-CM

## 2020-08-05 DIAGNOSIS — N18.30 ANEMIA IN STAGE 3 CHRONIC KIDNEY DISEASE: ICD-10-CM

## 2020-08-05 DIAGNOSIS — D84.9 IMMUNOSUPPRESSION: Chronic | ICD-10-CM

## 2020-08-05 DIAGNOSIS — N18.9 ANEMIA IN CHRONIC KIDNEY DISEASE, UNSPECIFIED CKD STAGE: ICD-10-CM

## 2020-08-05 DIAGNOSIS — R20.0 LEFT SIDED NUMBNESS: ICD-10-CM

## 2020-08-05 DIAGNOSIS — K21.9 GASTROESOPHAGEAL REFLUX DISEASE, ESOPHAGITIS PRESENCE NOT SPECIFIED: ICD-10-CM

## 2020-08-05 DIAGNOSIS — M54.16 LUMBAR RADICULOPATHY: ICD-10-CM

## 2020-08-05 DIAGNOSIS — R26.9 ABNORMALITY OF GAIT AND MOBILITY: ICD-10-CM

## 2020-08-05 DIAGNOSIS — E11.8 CONTROLLED TYPE 2 DIABETES MELLITUS WITH COMPLICATION, WITHOUT LONG-TERM CURRENT USE OF INSULIN: Chronic | ICD-10-CM

## 2020-08-05 DIAGNOSIS — M46.1 SACROILIITIS: ICD-10-CM

## 2020-08-05 DIAGNOSIS — E78.2 COMBINED HYPERLIPIDEMIA ASSOCIATED WITH TYPE 2 DIABETES MELLITUS: Chronic | ICD-10-CM

## 2020-08-05 DIAGNOSIS — M25.512 CHRONIC LEFT SHOULDER PAIN: ICD-10-CM

## 2020-08-05 DIAGNOSIS — H34.8120 HEMISPHERIC RETINAL VEIN OCCLUSION WITH MACULAR EDEMA OF LEFT EYE: ICD-10-CM

## 2020-08-05 DIAGNOSIS — M54.42 CHRONIC BILATERAL LOW BACK PAIN WITH LEFT-SIDED SCIATICA: ICD-10-CM

## 2020-08-05 DIAGNOSIS — R26.89 ANTALGIC GAIT: ICD-10-CM

## 2020-08-05 DIAGNOSIS — D63.1 ANEMIA IN STAGE 3 CHRONIC KIDNEY DISEASE: ICD-10-CM

## 2020-08-05 DIAGNOSIS — H25.12 NUCLEAR SCLEROSIS OF LEFT EYE: ICD-10-CM

## 2020-08-05 DIAGNOSIS — M62.81 MUSCLE WEAKNESS: ICD-10-CM

## 2020-08-05 DIAGNOSIS — M85.80 OSTEOPENIA, UNSPECIFIED LOCATION: ICD-10-CM

## 2020-08-05 DIAGNOSIS — Z12.39 SCREENING FOR BREAST CANCER: Primary | ICD-10-CM

## 2020-08-05 DIAGNOSIS — G89.4 CHRONIC PAIN SYNDROME: ICD-10-CM

## 2020-08-05 DIAGNOSIS — M48.02 CERVICAL SPINAL STENOSIS: ICD-10-CM

## 2020-08-05 DIAGNOSIS — Z99.89 DEPENDENCE ON OTHER ENABLING MACHINES AND DEVICES: ICD-10-CM

## 2020-08-05 DIAGNOSIS — Z53.1 REFUSAL OF BLOOD TRANSFUSIONS AS PATIENT IS JEHOVAH'S WITNESS: ICD-10-CM

## 2020-08-05 DIAGNOSIS — D63.1 ANEMIA IN CHRONIC KIDNEY DISEASE, UNSPECIFIED CKD STAGE: ICD-10-CM

## 2020-08-05 DIAGNOSIS — F11.90 CHRONIC, CONTINUOUS USE OF OPIOIDS: ICD-10-CM

## 2020-08-05 DIAGNOSIS — M54.2 NECK PAIN: ICD-10-CM

## 2020-08-05 DIAGNOSIS — M81.0 OSTEOPOROSIS, UNSPECIFIED OSTEOPOROSIS TYPE, UNSPECIFIED PATHOLOGICAL FRACTURE PRESENCE: ICD-10-CM

## 2020-08-05 DIAGNOSIS — I70.0 CALCIFICATION OF AORTA: ICD-10-CM

## 2020-08-05 DIAGNOSIS — G89.29 CHRONIC BILATERAL THORACIC BACK PAIN: ICD-10-CM

## 2020-08-05 DIAGNOSIS — G56.03 BILATERAL CARPAL TUNNEL SYNDROME: ICD-10-CM

## 2020-08-05 DIAGNOSIS — M51.16 LUMBAR DISC HERNIATION WITH RADICULOPATHY: ICD-10-CM

## 2020-08-05 DIAGNOSIS — I10 ESSENTIAL HYPERTENSION: Chronic | ICD-10-CM

## 2020-08-05 DIAGNOSIS — Z00.00 ENCOUNTER FOR PREVENTIVE HEALTH EXAMINATION: ICD-10-CM

## 2020-08-05 DIAGNOSIS — M16.12 OSTEOARTHRITIS OF LEFT HIP, UNSPECIFIED OSTEOARTHRITIS TYPE: ICD-10-CM

## 2020-08-05 DIAGNOSIS — H17.9 CORNEAL SCAR, RIGHT EYE: ICD-10-CM

## 2020-08-05 DIAGNOSIS — R29.818 POOR MOTOR CONTROL OF TRUNK: ICD-10-CM

## 2020-08-05 DIAGNOSIS — Z12.31 ENCOUNTER FOR SCREENING MAMMOGRAM FOR MALIGNANT NEOPLASM OF BREAST: ICD-10-CM

## 2020-08-05 DIAGNOSIS — R07.89 CHEST PAIN, ATYPICAL: ICD-10-CM

## 2020-08-05 DIAGNOSIS — G89.29 CHRONIC LEFT SHOULDER PAIN: ICD-10-CM

## 2020-08-05 DIAGNOSIS — Z74.09 IMPAIRED MOBILITY: ICD-10-CM

## 2020-08-05 DIAGNOSIS — M70.62 GREATER TROCHANTERIC BURSITIS OF LEFT HIP: ICD-10-CM

## 2020-08-05 DIAGNOSIS — M48.062 LUMBAR STENOSIS WITH NEUROGENIC CLAUDICATION: ICD-10-CM

## 2020-08-05 DIAGNOSIS — D50.9 IRON DEFICIENCY ANEMIA, UNSPECIFIED IRON DEFICIENCY ANEMIA TYPE: ICD-10-CM

## 2020-08-05 DIAGNOSIS — M25.552 LEFT HIP PAIN: ICD-10-CM

## 2020-08-05 DIAGNOSIS — Z79.52 CURRENT USE OF STEROID MEDICATION: ICD-10-CM

## 2020-08-05 DIAGNOSIS — D86.9 SARCOIDOSIS: Chronic | ICD-10-CM

## 2020-08-05 PROBLEM — M54.50 CHRONIC BILATERAL LOW BACK PAIN WITHOUT SCIATICA: Status: RESOLVED | Noted: 2018-11-13 | Resolved: 2020-08-05

## 2020-08-05 PROCEDURE — 3077F PR MOST RECENT SYSTOLIC BLOOD PRESSURE >= 140 MM HG: ICD-10-PCS | Mod: HCNC,CPTII,S$GLB, | Performed by: NURSE PRACTITIONER

## 2020-08-05 PROCEDURE — 99999 PR PBB SHADOW E&M-EST. PATIENT-LVL V: ICD-10-PCS | Mod: PBBFAC,HCNC,, | Performed by: NURSE PRACTITIONER

## 2020-08-05 PROCEDURE — 99499 UNLISTED E&M SERVICE: CPT | Mod: HCNC,S$GLB,, | Performed by: NURSE PRACTITIONER

## 2020-08-05 PROCEDURE — 3079F PR MOST RECENT DIASTOLIC BLOOD PRESSURE 80-89 MM HG: ICD-10-PCS | Mod: HCNC,CPTII,S$GLB, | Performed by: NURSE PRACTITIONER

## 2020-08-05 PROCEDURE — 3044F PR MOST RECENT HEMOGLOBIN A1C LEVEL <7.0%: ICD-10-PCS | Mod: HCNC,CPTII,S$GLB, | Performed by: NURSE PRACTITIONER

## 2020-08-05 PROCEDURE — G0439 PR MEDICARE ANNUAL WELLNESS SUBSEQUENT VISIT: ICD-10-PCS | Mod: HCNC,S$GLB,, | Performed by: NURSE PRACTITIONER

## 2020-08-05 PROCEDURE — 3079F DIAST BP 80-89 MM HG: CPT | Mod: HCNC,CPTII,S$GLB, | Performed by: NURSE PRACTITIONER

## 2020-08-05 PROCEDURE — G0439 PPPS, SUBSEQ VISIT: HCPCS | Mod: HCNC,S$GLB,, | Performed by: NURSE PRACTITIONER

## 2020-08-05 PROCEDURE — 99499 RISK ADDL DX/OHS AUDIT: ICD-10-PCS | Mod: HCNC,S$GLB,, | Performed by: NURSE PRACTITIONER

## 2020-08-05 PROCEDURE — 99999 PR PBB SHADOW E&M-EST. PATIENT-LVL V: CPT | Mod: PBBFAC,HCNC,, | Performed by: NURSE PRACTITIONER

## 2020-08-05 PROCEDURE — 3077F SYST BP >= 140 MM HG: CPT | Mod: HCNC,CPTII,S$GLB, | Performed by: NURSE PRACTITIONER

## 2020-08-05 PROCEDURE — 3044F HG A1C LEVEL LT 7.0%: CPT | Mod: HCNC,CPTII,S$GLB, | Performed by: NURSE PRACTITIONER

## 2020-08-05 RX ORDER — HYDROCODONE BITARTRATE AND ACETAMINOPHEN 5; 325 MG/1; MG/1
1 TABLET ORAL EVERY 6 HOURS PRN
Qty: 90 TABLET | Refills: 0 | Status: CANCELLED | OUTPATIENT
Start: 2020-08-05

## 2020-08-05 RX ORDER — HYDROCODONE BITARTRATE AND ACETAMINOPHEN 5; 325 MG/1; MG/1
1 TABLET ORAL EVERY 6 HOURS PRN
COMMUNITY
Start: 2020-08-01 | End: 2020-08-07

## 2020-08-05 NOTE — PATIENT INSTRUCTIONS
Counseling and Referral of Other Preventative  (Italic type indicates deductible and co-insurance are waived)    Patient Name: Regino Lawrence  Today's Date: 8/5/2020    Health Maintenance       Date Due Completion Date    Complete Opioid Risk Tool 10/29/1963 ---    Shingles Vaccine (2 of 3) 07/20/2015 5/25/2015    DEXA SCAN 05/05/2020 5/5/2017    Foot Exam 06/13/2020 6/13/2019 (Done)    Override on 6/13/2019: Done    Override on 6/19/2018: Done    Override on 5/12/2017: Done    Mammogram 07/25/2020 7/25/2019    Influenza Vaccine (1) 09/01/2020 9/26/2019    Override on 8/8/2018: Done    Override on 9/27/2017: Done    Lipid Panel 10/18/2020 10/18/2019    Hemoglobin A1c 11/22/2020 5/22/2020    Urine Microalbumin 05/22/2021 5/22/2020    Eye Exam 06/17/2021 6/17/2020    Colorectal Cancer Screening 02/09/2025 6/2/2020    TETANUS VACCINE 05/16/2026 5/16/2016        Orders Placed This Encounter   Procedures    Mammo Digital Screening Bilat w/ Abdoul    DXA Bone Density Spine And Hip     The following information is provided to all patients.  This information is to help you find resources for any of the problems found today that may be affecting your health:                Living healthy guide: www.[facility].[place].gov      Understanding Diabetes: www.diabetes.org      Eating healthy: www.cdc.gov/healthyweight      CDC home safety checklist: www.cdc.gov/steadi/patient.html      Agency on Aging: www.goea.[place].gov      Alcoholics anonymous (AA): www.aa.org      Physical Activity: www.himanshu.nih.gov/mw6guva      Tobacco use: www.quitwithusla.org       Schedule mammogram and dexa scan   Follow up with Dr. Mendoza as scheduled

## 2020-08-05 NOTE — PROGRESS NOTES
Subjective:       Patient ID: Regino Lawrence is a 74 y.o. female.    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, without long-term current use of insulin     Depression     Diabetes mellitus, type 2     Eye injury as a child     k-abrasion  od    Hyperlipidemia     Hypertension     Hypothyroidism     Immune deficiency disorder     Immune disorder     Myalgia and myositis 2012    Osteoporosis     Polyneuropathy     Renal manifestation of secondary diabetes mellitus     Sarcoidosis     Type 2 diabetes mellitus     Ulcer     no cancer    Urinary incontinence      Past Surgical History:   Procedure Laterality Date    CARPAL TUNNEL RELEASE      Rt wrist    CATARACT EXTRACTION W/  INTRAOCULAR LENS IMPLANT Right 2015    Dr. Azevedo    CATARACT EXTRACTION W/  INTRAOCULAR LENS IMPLANT Left 2015    Dr. Azevedo     SECTION      CHOLECYSTECTOMY      INJECTION OF JOINT Left 3/21/2019    Procedure: Injection, Joint  fLUOROSCOPIC jOINT iNJECTION (hIP iNJECTION) LEFT ROCH BURSA AS WELL LEFT TROCHANTERIC BURSA;  Surgeon: Alfonso Richards MD;  Location: [facility];  Service: Pain Management;  Laterality: Left;  NEEDS CONSENT, DIABETIC    INJECTION OF JOINT Left 2019    Procedure: Injection, Joint FLUOROSCOPIC JOINT INJECTION (HIP INJECTION) LEFT HIP;  Surgeon: Alfonso Richards MD;  Location: [facility];  Service: Pain Management;  Laterality: Left;  NEEDS CONSENT    INJECTION OF JOINT Left 2019    Procedure: INJECTION, JOINT;  Surgeon: Alfonso Richards MD;  Location: [facility];  Service: Pain Management;  Laterality:  Left;  Left Hip and Left GTB Injections    INJECTION OF JOINT Left 7/27/2020    Procedure: INJECTION, JOINT, LEFT HIP and LEFT GREATER TROCHANTERIC BURSA;  Surgeon: Alfonso Richards MD;  Location: [facility];  Service: Pain Management;  Laterality: Left;  INJECTION, JOINT, LEFT HIP and LEFT GREATER TROCHANTERIC BURSA    TRANSFORAMINAL EPIDURAL INJECTION OF STEROID Bilateral 12/5/2019    Procedure: INJECTION, STEROID, EPIDURAL, TRANSFORAMINAL APPROACH;  Surgeon: Alfonso Richards MD;  Location: [facility];  Service: Pain Management;  Laterality: Bilateral;  B/L TF RHIANNA L5  Consent Needed    TRANSFORAMINAL EPIDURAL INJECTION OF STEROID Bilateral 6/29/2020    Procedure: INJECTION, STEROID, EPIDURAL, TRANSFORAMINAL APPROACH L5/S1;  Surgeon: Alfonso Richards MD;  Location: [facility];  Service: Pain Management;  Laterality: Bilateral;  B/L TF RHIANNA L5/S1    TUBAL LIGATION        reports that she has never smoked. She has never used smokeless tobacco. She reports that she does not drink alcohol or use drugs.  Review of Systems    Objective:      Physical Exam  Constitutional:       Appearance: Normal appearance.   Musculoskeletal:      Comments: Protective Sensation (w/ 10 gram monofilament):  Right: Decreased  Left: Decreased    Visual Inspection:  None -  Bilateral normal visual exam     Pedal Pulses:   Right: Present  Left: Present    Posterior tibialis:   Right:Present  Left: Present       Neurological:      Mental Status: She is alert and oriented to person, place, and time.           Assessment:       1. Screening for breast cancer    2. Osteoporosis, unspecified osteoporosis type, unspecified pathological fracture presence    3. Encounter for screening mammogram for malignant neoplasm of breast     4. Anemia in chronic kidney disease, unspecified CKD stage    5. Anemia in stage 3 chronic kidney disease    6. Antalgic gait    7. Chronic bilateral thoracic back pain    8. Calcification of aorta    9. Bilateral carpal  tunnel syndrome    10. Cervical spinal stenosis    11. Chest pain, atypical    12. Chronic bilateral low back pain with left-sided sciatica    13. Chronic pain syndrome    14. Chronic, continuous use of opioids    15. Combined 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    16. 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 Corneal scar, right eye    18. Current use of steroid medication    19. Debility    20. Degenerative disc disease, lumbar    21. Primary osteoarthritis of left hip    22. Controlled type 2 diabetes mellitus with complication, without long-term current use of insulin    23. DM type 2 without retinopathy    24. FAUSTIN (dyspnea on exertion)    25. Essential hypertension    26. Fatigue, unspecified type    27. Gastroesophageal reflux disease, esophagitis presence not specified    28. Greater trochanteric bursitis of left hip    29. Hemispheric retinal vein occlusion with macular edema of left eye    30. Hypothyroidism, unspecified type    31. Immunosuppression    32. Impaired mobility    33. Iron deficiency anemia, unspecified iron deficiency anemia type    34. Left hip pain    35. Chronic left shoulder pain    36. Left sided numbness    37. Lumbar disc herniation with radiculopathy    38. Lumbar radiculopathy    39. Lumbar stenosis with neurogenic claudication    40. Muscle weakness    41. Myopathy    42. Neck pain    43. Nuclear sclerosis of left eye    44. Osteoarthritis of left hip, unspecified osteoarthritis type    45. Osteopenia, unspecified location    46. Polyneuropathy    47. Poor motor control of trunk    48. Refusal of blood transfusions as patient is Sikhism    49. S/P cervical spinal fusion    50. Sacroiliitis    51. Sarcoidosis    52. Dependence on other enabling machines and devices    53. Abnormality of gait and mobility    54. Encounter for  preventive health examination        Plan:         Screening for breast cancer  -     Mammo Digital Screening Bilat w/ Abdoul; Future; Expected date: 08/05/2020    Osteoporosis, unspecified osteoporosis type, unspecified pathological fracture presence  -     DXA Bone Density Spine And Hip; Future; Expected date: 08/05/2020    Encounter for screening mammogram for malignant neoplasm of breast   -     Mammo Digital Screening Bilat w/ Abdoul; Future; Expected date: 08/05/2020    Anemia in chronic kidney disease, unspecified CKD stage  - avoid all anti-inflammatories and stay well hydrated     Anemia in stage 3 chronic kidney disease  - stable, continue to monitor    Antalgic gait  - uses cane, walker, and scooter    Chronic bilateral thoracic back pain  - The current medical regimen is effective;  continue present plan and medications.    Calcification of aorta  - Stable / Asymptomatic is on blood pressure and cholesterol lowering medications    Bilateral carpal tunnel syndrome  - had one side fixed problems with other side    Cervical spinal stenosis  - The current medical regimen is effective;  continue present plan and medications.    Chest pain, atypical  - stable at this time    Chronic bilateral low back pain with left-sided sciatica  - The current medical regimen is effective;  continue present plan and medications.    Chronic pain syndrome  - The current medical regimen is effective;  continue present plan and medications.    Chronic, continuous use of opioids  - followed by Dr. Mendoza    Combined 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  - The current medical regimen is effective;  continue present plan and medications.    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    Corneal scar, right eye  -  followed by Dr. Jarrell    Current use of steroid medication  - The current medical regimen is effective;  continue present plan and medications.    Debility  - uses cane, walker and scooter     Degenerative disc disease, lumbar  - The current medical regimen is effective;  continue present plan and medications.    Primary osteoarthritis of left hip  - The current medical regimen is effective;  continue present plan and medications.    Controlled type 2 diabetes mellitus with complication, without long-term current use of insulin  - The current medical regimen is effective;  continue present plan and medications.    DM type 2 without retinopathy  - The current medical regimen is effective;  continue present plan and medications.    FAUSTIN (dyspnea on exertion)  - chronic but stable if moves very slowly    Essential hypertension  - The current medical regimen is effective;  continue present plan and medications.    Fatigue, unspecified type  - monitor labs frequently    Gastroesophageal reflux disease, esophagitis presence not specified  - The current medical regimen is effective;  continue present plan and medications.    Greater trochanteric bursitis of left hip  - The current medical regimen is effective;  continue present plan and medications.    Hemispheric retinal vein occlusion with macular edema of left eye  - followed by Dr. Jarrell    Hypothyroidism, unspecified type  - The current medical regimen is effective;  continue present plan and medications.    Immunosuppression  - on continuous steroids     Impaired mobility  - uses cane, walker and scooter    Iron deficiency anemia, unspecified iron deficiency anemia type  - monitor has required iron transfusion  in the past     Left hip pain  - The current medical regimen is effective;  continue present plan and medications.    Chronic left shoulder pain  - The current medical regimen is effective;  continue present plan and medications.    Left sided  numbness  - chronic but stable    Lumbar disc herniation with radiculopathy  -The current medical regimen is effective;  continue present plan and medications.    Lumbar radiculopathy  - The current medical regimen is effective;  continue present plan and medications.    Lumbar stenosis with neurogenic claudication  - The current medical regimen is effective;  continue present plan and medications.    Muscle weakness  - uses cane, walker and scooter    Myopathy  - stable    Neck pain  - The current medical regimen is effective;  continue present plan and medications.    Nuclear sclerosis of left eye  - followed by Dr. Jarrell    Osteoarthritis of left hip, unspecified osteoarthritis type  - The current medical regimen is effective;  continue present plan and medications.    Osteopenia, unspecified location  - check dexa scan    Polyneuropathy  - The current medical regimen is effective;  continue present plan and medications.    Poor motor control of trunk  - use cane, walker and scooter    Refusal of blood transfusions as patient is Tenriism    S/P cervical spinal fusion  - stable takes pain medications as needed    Sacroiliitis  - The current medical regimen is effective;  continue present plan and medications.    Sarcoidosis  - on prednisone    Dependence on other enabling machines and devices  - uses cane, walker and scooter    Abnormality of gait and mobility  - uses cane, walker and scooter    Encounter for preventive health examination  - foot exam done  - dexa and mammogram ordered

## 2020-08-06 ENCOUNTER — PATIENT MESSAGE (OUTPATIENT)
Dept: FAMILY MEDICINE | Facility: CLINIC | Age: 75
End: 2020-08-06

## 2020-08-07 ENCOUNTER — PATIENT MESSAGE (OUTPATIENT)
Dept: FAMILY MEDICINE | Facility: CLINIC | Age: 75
End: 2020-08-07

## 2020-08-07 DIAGNOSIS — M51.36 DEGENERATIVE DISC DISEASE, LUMBAR: ICD-10-CM

## 2020-08-07 RX ORDER — HYDROCODONE BITARTRATE AND ACETAMINOPHEN 5; 325 MG/1; MG/1
1 TABLET ORAL EVERY 6 HOURS PRN
Status: CANCELLED | OUTPATIENT
Start: 2020-08-07

## 2020-08-07 RX ORDER — HYDROCODONE BITARTRATE AND ACETAMINOPHEN 5; 325 MG/1; MG/1
1 TABLET ORAL EVERY 6 HOURS PRN
Qty: 90 TABLET | Refills: 0 | Status: SHIPPED | OUTPATIENT
Start: 2020-08-07 | End: 2020-09-02 | Stop reason: SDUPTHER

## 2020-08-10 ENCOUNTER — TELEPHONE (OUTPATIENT)
Dept: PAIN MEDICINE | Facility: CLINIC | Age: 75
End: 2020-08-10

## 2020-08-10 ENCOUNTER — PATIENT OUTREACH (OUTPATIENT)
Dept: ADMINISTRATIVE | Facility: OTHER | Age: 75
End: 2020-08-10

## 2020-08-10 NOTE — TELEPHONE ENCOUNTER
Staff called to confirm 08/11/20 10 am in office visit with Jenny Balbuena Np. Patient informed of instructions.     Patient confirmed and verbalized understanding and expressed thanks.

## 2020-08-11 ENCOUNTER — OFFICE VISIT (OUTPATIENT)
Dept: PAIN MEDICINE | Facility: CLINIC | Age: 75
End: 2020-08-11
Payer: MEDICARE

## 2020-08-11 VITALS
OXYGEN SATURATION: 100 % | WEIGHT: 123.44 LBS | RESPIRATION RATE: 18 BRPM | HEIGHT: 61 IN | HEART RATE: 75 BPM | TEMPERATURE: 98 F | DIASTOLIC BLOOD PRESSURE: 76 MMHG | BODY MASS INDEX: 23.3 KG/M2 | SYSTOLIC BLOOD PRESSURE: 151 MMHG

## 2020-08-11 DIAGNOSIS — M70.62 GREATER TROCHANTERIC BURSITIS OF LEFT HIP: ICD-10-CM

## 2020-08-11 DIAGNOSIS — M53.3 SACROILIAC JOINT PAIN: Primary | ICD-10-CM

## 2020-08-11 DIAGNOSIS — M51.36 DDD (DEGENERATIVE DISC DISEASE), LUMBAR: ICD-10-CM

## 2020-08-11 DIAGNOSIS — M47.816 LUMBAR SPONDYLOSIS: ICD-10-CM

## 2020-08-11 DIAGNOSIS — M16.12 PRIMARY OSTEOARTHRITIS OF LEFT HIP: ICD-10-CM

## 2020-08-11 DIAGNOSIS — G89.4 CHRONIC PAIN DISORDER: ICD-10-CM

## 2020-08-11 PROCEDURE — 3078F PR MOST RECENT DIASTOLIC BLOOD PRESSURE < 80 MM HG: ICD-10-PCS | Mod: HCNC,CPTII,S$GLB, | Performed by: NURSE PRACTITIONER

## 2020-08-11 PROCEDURE — 1125F AMNT PAIN NOTED PAIN PRSNT: CPT | Mod: HCNC,S$GLB,, | Performed by: NURSE PRACTITIONER

## 2020-08-11 PROCEDURE — 3008F BODY MASS INDEX DOCD: CPT | Mod: HCNC,CPTII,S$GLB, | Performed by: NURSE PRACTITIONER

## 2020-08-11 PROCEDURE — 99213 PR OFFICE/OUTPT VISIT, EST, LEVL III, 20-29 MIN: ICD-10-PCS | Mod: HCNC,S$GLB,, | Performed by: NURSE PRACTITIONER

## 2020-08-11 PROCEDURE — 99999 PR PBB SHADOW E&M-EST. PATIENT-LVL V: ICD-10-PCS | Mod: PBBFAC,HCNC,, | Performed by: NURSE PRACTITIONER

## 2020-08-11 PROCEDURE — 1101F PR PT FALLS ASSESS DOC 0-1 FALLS W/OUT INJ PAST YR: ICD-10-PCS | Mod: HCNC,CPTII,S$GLB, | Performed by: NURSE PRACTITIONER

## 2020-08-11 PROCEDURE — 3077F SYST BP >= 140 MM HG: CPT | Mod: HCNC,CPTII,S$GLB, | Performed by: NURSE PRACTITIONER

## 2020-08-11 PROCEDURE — 3008F PR BODY MASS INDEX (BMI) DOCUMENTED: ICD-10-PCS | Mod: HCNC,CPTII,S$GLB, | Performed by: NURSE PRACTITIONER

## 2020-08-11 PROCEDURE — 3078F DIAST BP <80 MM HG: CPT | Mod: HCNC,CPTII,S$GLB, | Performed by: NURSE PRACTITIONER

## 2020-08-11 PROCEDURE — 1159F PR MEDICATION LIST DOCUMENTED IN MEDICAL RECORD: ICD-10-PCS | Mod: HCNC,S$GLB,, | Performed by: NURSE PRACTITIONER

## 2020-08-11 PROCEDURE — 3077F PR MOST RECENT SYSTOLIC BLOOD PRESSURE >= 140 MM HG: ICD-10-PCS | Mod: HCNC,CPTII,S$GLB, | Performed by: NURSE PRACTITIONER

## 2020-08-11 PROCEDURE — 1159F MED LIST DOCD IN RCRD: CPT | Mod: HCNC,S$GLB,, | Performed by: NURSE PRACTITIONER

## 2020-08-11 PROCEDURE — 99213 OFFICE O/P EST LOW 20 MIN: CPT | Mod: HCNC,S$GLB,, | Performed by: NURSE PRACTITIONER

## 2020-08-11 PROCEDURE — 99999 PR PBB SHADOW E&M-EST. PATIENT-LVL V: CPT | Mod: PBBFAC,HCNC,, | Performed by: NURSE PRACTITIONER

## 2020-08-11 PROCEDURE — 1125F PR PAIN SEVERITY QUANTIFIED, PAIN PRESENT: ICD-10-PCS | Mod: HCNC,S$GLB,, | Performed by: NURSE PRACTITIONER

## 2020-08-11 PROCEDURE — 1101F PT FALLS ASSESS-DOCD LE1/YR: CPT | Mod: HCNC,CPTII,S$GLB, | Performed by: NURSE PRACTITIONER

## 2020-08-11 NOTE — PROGRESS NOTES
Chronic patient Established Note (Follow up visit)      SUBJECTIVE:    Interval History 8/11/2020:  The patient returns to clinic today for follow up of low back pain. She is s/p left hip and GTB injection on 7/27/2020. She reports limited relief of her pain. She continues to report left sided hip and buttock pain. She reports intermittent radiating pain down the lateral aspect of her left leg to her ankle. She describes this pain as sharp in nature. She denies any radiating leg pain. She continues to take Gabapentin, Zanaflex, and Norco per PCP. She is performing some home stretches. She denies any other health changes. Her pain today is 10/10.    Interval History 7/14/2020:  Regino Lawrence presents to the clinic for a follow-up appointment for low back and hip pain. She is s/p bilateral L5/S1 TF RHIANNA on 6/29/2020. She reports 100% relief of her low back pain. She reports increased left hip pain. She describes this pain as constant, sharp, and stabbing in nature. Her pain is worse with sitting, walking, and laying on her left side. She denies any radicular leg pain. She continues to take Gabapentin, Zanaflex, and Norco per her PCP. She denies any other health changes. Her pain today is 10/10.     Pain Disability Index Review:  Last 3 PDI Scores 8/11/2020 7/14/2020 6/10/2020   Pain Disability Index (PDI) 66 [phone]       Pain Medications:  Gabapentin  Zanaflex  Norco    Opioid Contract: yes- with PCP     report:  Reviewed and consistent with medication use as prescribed.    Pain Procedures:   10/8/2018- Left L5 TF RHIANNA  12/6/2018- Bilateral L5 TF RHIANNA  3/21/2019- Left hip and GTB injection  7/22/2019- Left hip joint injection  9/12/2019- Left hip and GTB injection   12/15/2019- Bilateral L5 TF RHIANNA  6/29/2020- Bilateral L5/S1 TF RHIANNA  7/27/2020- Left hip and GTB injections    Physical Therapy/Home Exercise: no    Imaging:   MRI Lumbar Spine 9/12/2018:  COMPARISON:  None.     FINDINGS:  The distal cord/conus  demonstrates normal size and signal.  No evidence of osteomyelitis, marrow replacement process, or acute fracture.  No paraspinal masses.     At L1-2, there is asymmetric disc bulging to the right, resulting in mild right-sided neural foraminal narrowing.  No spinal canal stenosis.     L1-2 is unremarkable.     L2-3 demonstrates mild disc bulging slightly asymmetric to the left resulting in mild left-sided neural foraminal narrowing.  No spinal canal stenosis.     L4-5 demonstrates minimal anterolisthesis anterolisthesis.  There is mild facet arthropathy and disc bulging.  This results in mild left-sided neural foraminal narrowing.  No spinal canal stenosis.     At L5-S1, there is a moderate sized left lateral recess/foraminal disc extrusion effacing the left neural foramen, likely impinging upon the exiting left L5 nerve root.  There is left-sided degenerative disc disease, noting disc height loss and sub endplate marrow edema.  There is moderate bilateral facet arthropathy.  No spinal canal stenosis.     IMPRESSION:      Moderate size left foraminal disc extrusion at L5-S1, likely impinging upon the exiting left L5 nerve root.     Additional lumbar spondylosis, resulting in mild neural foraminal narrowing at L1-2, L2-3, and L4-5, as above.  No spinal canal stenosis.    Allergies:   Review of patient's allergies indicates:   Allergen Reactions    Azathioprine Shortness Of Breath and Other (See Comments)     Fatigue       Current Medications:   Current Outpatient Medications   Medication Sig Dispense Refill    ACCU-CHEK FASTCLIX LANCING DEV Kit USE AS DIRECTED. 1 each 0    ALCOHOL ANTISEPTIC PADS (ALCOHOL PREP PADS TOP)       atorvastatin (LIPITOR) 20 MG tablet Take 1 tablet (20 mg total) by mouth once daily. 90 tablet 3    blood sugar diagnostic (ACCU-CHEK SMARTVIEW TEST STRIP) Strp Use as directed to check blood sugar twice daily. 200 strip 3    blood-glucose meter kit Use as instructed 1 each 0     carvediloL (COREG) 25 MG tablet Take 1 tablet (25 mg total) by mouth 2 (two) times daily. 180 tablet 0    cloNIDine (CATAPRES) 0.3 MG tablet Take 1 tablet (0.3 mg total) by mouth 3 (three) times daily. 270 tablet 1    diaper,brief,adult,disposable Misc       epoetin german-epbx (RETACRIT) 10,000 unit/mL imjection Inject 20,000 Units into the skin every 14 (fourteen) days.      fenofibrate 160 MG Tab Take 1 tablet by mouth once daily 90 tablet 3    FLUZONE HIGH-DOSE 2019-20, PF, 180 mcg/0.5 mL Syrg PHARMACIST ADMINISTERED IMMUNIZATION ADMINISTERED AT TIME OF DISPENSING  0    folic acid (FOLVITE) 1 MG tablet Take 1 tablet (1,000 mcg total) by mouth once daily. 90 tablet 0    gabapentin (NEURONTIN) 400 MG capsule TAKE 1 CAPSULE BY MOUTH THREE TIMES DAILY 90 capsule 1    HYDROcodone-acetaminophen (NORCO) 5-325 mg per tablet Take 1 tablet by mouth every 6 (six) hours as needed. 90 tablet 0    levothyroxine (SYNTHROID) 50 MCG tablet TAKE 1 TABLET BY MOUTH ONCE DAILY BEFORE BREAKFAST 90 tablet 1    metFORMIN (GLUCOPHAGE) 1000 MG tablet Take 1 tablet (1,000 mg total) by mouth 2 (two) times daily with meals. 180 tablet 1    NIFEdipine (PROCARDIA-XL) 30 MG (OSM) 24 hr tablet Take 1 tablet (30 mg total) by mouth once daily. 90 tablet 1    predniSONE (DELTASONE) 5 MG tablet Take 1.5 tablets (7.5 mg total) by mouth once daily. (Patient taking differently: Take 5 mg by mouth once daily. ) 135 tablet 1    tiZANidine (ZANAFLEX) 2 MG tablet TAKE 2 TABLETS BY MOUTH EVERY 8 HOURS AS NEEDED 270 tablet 0    calcium carbonate (OS-MALCOLM) 600 mg calcium (1,500 mg) Tab Take 1 tablet by mouth 2 (two) times daily.       oxybutynin (DITROPAN-XL) 5 MG TR24 Take 1 tablet (5 mg total) by mouth once daily. 30 tablet 11     No current facility-administered medications for this visit.      Facility-Administered Medications Ordered in Other Visits   Medication Dose Route Frequency Provider Last Rate Last Dose    0.9%  NaCl infusion  500  mL Intravenous Continuous Ever Resendez MD           REVIEW OF SYSTEMS:    GENERAL:  No weight loss, malaise or fevers.  HEENT:  Negative for frequent or significant headaches.  NECK:  Negative for lumps, goiter, pain and significant neck swelling.  RESPIRATORY:  Negative for cough, wheezing or shortness of breath.  CARDIOVASCULAR:  Negative for chest pain, leg swelling or palpitations. HTN  GI:  Negative for abdominal discomfort, blood in stools or black stools or change in bowel habits.  RENAL: CKD  MUSCULOSKELETAL:  See HPI.  SKIN:  Negative for lesions, rash, and itching.  PSYCH:  Negative for sleep disturbance, mood disorder and recent psychosocial stressors.  HEMATOLOGY/LYMPHOLOGY:  Negative for prolonged bleeding, bruising easily or swollen nodes.  NEURO:   No history of headaches, syncope, paralysis, seizures or tremors.  ENDO: Diabetes, thyroid disorder.   All other reviewed and negative other than HPI.    Past Medical History:  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, without long-term current use of insulin     Depression     Diabetes mellitus, type 2     Eye injury as a child     k-abrasion  od    Hyperlipidemia     Hypertension     Hypothyroidism     Immune deficiency disorder     Immune disorder     Myalgia and myositis 9/6/2012    Osteoporosis     Polyneuropathy     Renal manifestation of secondary diabetes mellitus     Sarcoidosis     Type 2 diabetes mellitus     Ulcer     no cancer    Urinary incontinence        Past Surgical History:  Past Surgical History:   Procedure Laterality Date    CARPAL TUNNEL RELEASE      Rt wrist    CATARACT EXTRACTION W/  INTRAOCULAR LENS  IMPLANT Right 2015    Dr. Azevedo    CATARACT EXTRACTION W/  INTRAOCULAR LENS IMPLANT Left 2015    Dr. Azevedo     SECTION      CHOLECYSTECTOMY      INJECTION OF JOINT Left 3/21/2019    Procedure: Injection, Joint  fLUOROSCOPIC jOINT iNJECTION (hIP iNJECTION) LEFT ROCH BURSA AS WELL LEFT TROCHANTERIC BURSA;  Surgeon: Alfonso Richards MD;  Location: [facility];  Service: Pain Management;  Laterality: Left;  NEEDS CONSENT, DIABETIC    INJECTION OF JOINT Left 2019    Procedure: Injection, Joint FLUOROSCOPIC JOINT INJECTION (HIP INJECTION) LEFT HIP;  Surgeon: Alfonso Richards MD;  Location: [facility];  Service: Pain Management;  Laterality: Left;  NEEDS CONSENT    INJECTION OF JOINT Left 2019    Procedure: INJECTION, JOINT;  Surgeon: Alfonso Richards MD;  Location: [facility];  Service: Pain Management;  Laterality: Left;  Left Hip and Left GTB Injections    INJECTION OF JOINT Left 2020    Procedure: INJECTION, JOINT, LEFT HIP and LEFT GREATER TROCHANTERIC BURSA;  Surgeon: Alfonso Richards MD;  Location: [facility];  Service: Pain Management;  Laterality: Left;  INJECTION, JOINT, LEFT HIP and LEFT GREATER TROCHANTERIC BURSA    TRANSFORAMINAL EPIDURAL INJECTION OF STEROID Bilateral 2019    Procedure: INJECTION, STEROID, EPIDURAL, TRANSFORAMINAL APPROACH;  Surgeon: Alfonso Richards MD;  Location: [facility];  Service: Pain Management;  Laterality: Bilateral;  B/L TF RHIANNA L5  Consent Needed    TRANSFORAMINAL EPIDURAL INJECTION OF STEROID Bilateral 2020    Procedure: INJECTION, STEROID, EPIDURAL, TRANSFORAMINAL APPROACH L5/S1;  Surgeon: Alfonso Richards MD;  Location: [facility];  Service: Pain Management;  Laterality: Bilateral;  B/L TF RHIANNA L5/S1    TUBAL LIGATION         Family History:  Family History   Problem Relation Age of Onset    Hypertension Mother     Cataracts Mother     No Known Problems Father     Hypertension Maternal Grandmother     Glaucoma Sister      Arthritis Sister     No Known Problems Brother     No Known Problems Maternal Aunt     No Known Problems Maternal Uncle     No Known Problems Paternal Aunt     No Known Problems Paternal Uncle     No Known Problems Maternal Grandfather     No Known Problems Paternal Grandmother     No Known Problems Paternal Grandfather     Kidney failure Sister     Hepatitis Sister     Cancer Sister         bone cancer     Immunodeficiency Sister     Diabetes Son     Hypertension Son     Lupus Neg Hx     Rheum arthritis Neg Hx     Amblyopia Neg Hx     Blindness Neg Hx     Macular degeneration Neg Hx     Retinal detachment Neg Hx     Strabismus Neg Hx     Stroke Neg Hx     Thyroid disease Neg Hx     Endometrial cancer Neg Hx     Vaginal cancer Neg Hx     Cervical cancer Neg Hx        Social History:  Social History     Socioeconomic History    Marital status:      Spouse name: Gasper     Number of children: 5    Years of education: Business school after high school    Highest education level: 12th grade   Occupational History    Not on file   Social Needs    Financial resource strain: Not very hard    Food insecurity     Worry: Never true     Inability: Never true    Transportation needs     Medical: No     Non-medical: No   Tobacco Use    Smoking status: Never Smoker    Smokeless tobacco: Never Used   Substance and Sexual Activity    Alcohol use: No    Drug use: No    Sexual activity: Not Currently     Partners: Male   Lifestyle    Physical activity     Days per week: 0 days     Minutes per session: 0 min    Stress: Not at all   Relationships    Social connections     Talks on phone: Once a week     Gets together: Once a week     Attends Alevism service: Never     Active member of club or organization: No     Attends meetings of clubs or organizations: Never     Relationship status:    Other Topics Concern    Not on file   Social History Narrative    Not on file  "      OBJECTIVE:    BP (!) 151/76   Pulse 75   Temp 98.1 °F (36.7 °C)   Resp 18   Ht 5' 1" (1.549 m)   Wt 56 kg (123 lb 7.3 oz)   LMP  (LMP Unknown)   SpO2 100%   BMI 23.33 kg/m²     PHYSICAL EXAMINATION:    General appearance: Well appearing, in no acute distress, alert and oriented x3.  Psych:  Mood and affect appropriate.  Skin: Skin color, texture, turgor normal, no rashes or lesions, in both upper and lower body.  Head/face:  Atraumatic, normocephalic. No palpable lymph nodes  Cor: RRR  Pulm: CTA  GI: Abdomen soft and non-tender.  Back: Straight leg raising in the sitting position is negative to radicular pain bilaterally. There is mild pain with palpation over lumbar facet joints. Limited ROM with mild pain on extension. Positive facet loading bilaterally.   Extremities: Peripheral joint ROM is full and pain free without obvious instability or laxity in all four extremities. No deformities, edema, or skin discoloration. Good capillary refill.  Musculoskeletal: Shoulder and knee provocative maneuvers are negative. There is pain with internal and external rotation of left hip. There is significant pain with palpation over left SI joint. FABERs is positive on the left. There is mild pain with palpation over left GTB. Bilateral lower extremity strength is normal and symmetric.  No atrophy or tone abnormalities are noted.  Neuro: Bilateral lower extremity coordination and muscle stretch reflexes are physiologic and symmetric.  Plantar response are downgoing. No loss of sensation is noted.  Gait: Antalgic- ambulates with electric scooter.     ASSESSMENT: 74 y.o. year old female with low back and hip pain, consistent with the followin. Sacroiliac joint pain     2. Lumbar spondylosis     3. DDD (degenerative disc disease), lumbar     4. Primary osteoarthritis of left hip     5. Greater trochanteric bursitis of left hip     6. Chronic pain disorder           PLAN:     - Previous imaging was reviewed " and discussed with the patient today.    - Schedule for left SI joint injection.      - She is s/p bilateral L5/S1 TF RHIANNA with benefit. We can repeat this as needed.     - Continue current medications.     - I have stressed the importance of physical activity and a home exercise plan to help with pain and improve health.    - RTC 2 weeks after above procedure.     - Counseled patient regarding the importance of activity modification and constant sleeping habits.    - Dr. Richards was consulted on the patient and agrees with this plan.    The above plan and management options were discussed at length with patient. Patient is in agreement with the above and verbalized understanding.    Jenny Balbuena NP  08/11/2020

## 2020-08-11 NOTE — H&P (VIEW-ONLY)
Chronic patient Established Note (Follow up visit)      SUBJECTIVE:    Interval History 8/11/2020:  The patient returns to clinic today for follow up of low back pain. She is s/p left hip and GTB injection on 7/27/2020. She reports limited relief of her pain. She continues to report left sided hip and buttock pain. She reports intermittent radiating pain down the lateral aspect of her left leg to her ankle. She describes this pain as sharp in nature. She denies any radiating leg pain. She continues to take Gabapentin, Zanaflex, and Norco per PCP. She is performing some home stretches. She denies any other health changes. Her pain today is 10/10.    Interval History 7/14/2020:  Regino Lawrence presents to the clinic for a follow-up appointment for low back and hip pain. She is s/p bilateral L5/S1 TF RHIANNA on 6/29/2020. She reports 100% relief of her low back pain. She reports increased left hip pain. She describes this pain as constant, sharp, and stabbing in nature. Her pain is worse with sitting, walking, and laying on her left side. She denies any radicular leg pain. She continues to take Gabapentin, Zanaflex, and Norco per her PCP. She denies any other health changes. Her pain today is 10/10.     Pain Disability Index Review:  Last 3 PDI Scores 8/11/2020 7/14/2020 6/10/2020   Pain Disability Index (PDI) 66 [phone]       Pain Medications:  Gabapentin  Zanaflex  Norco    Opioid Contract: yes- with PCP     report:  Reviewed and consistent with medication use as prescribed.    Pain Procedures:   10/8/2018- Left L5 TF RHIANNA  12/6/2018- Bilateral L5 TF RHIANNA  3/21/2019- Left hip and GTB injection  7/22/2019- Left hip joint injection  9/12/2019- Left hip and GTB injection   12/15/2019- Bilateral L5 TF RHIANNA  6/29/2020- Bilateral L5/S1 TF RHIANNA  7/27/2020- Left hip and GTB injections    Physical Therapy/Home Exercise: no    Imaging:   MRI Lumbar Spine 9/12/2018:  COMPARISON:  None.     FINDINGS:  The distal cord/conus  demonstrates normal size and signal.  No evidence of osteomyelitis, marrow replacement process, or acute fracture.  No paraspinal masses.     At L1-2, there is asymmetric disc bulging to the right, resulting in mild right-sided neural foraminal narrowing.  No spinal canal stenosis.     L1-2 is unremarkable.     L2-3 demonstrates mild disc bulging slightly asymmetric to the left resulting in mild left-sided neural foraminal narrowing.  No spinal canal stenosis.     L4-5 demonstrates minimal anterolisthesis anterolisthesis.  There is mild facet arthropathy and disc bulging.  This results in mild left-sided neural foraminal narrowing.  No spinal canal stenosis.     At L5-S1, there is a moderate sized left lateral recess/foraminal disc extrusion effacing the left neural foramen, likely impinging upon the exiting left L5 nerve root.  There is left-sided degenerative disc disease, noting disc height loss and sub endplate marrow edema.  There is moderate bilateral facet arthropathy.  No spinal canal stenosis.     IMPRESSION:      Moderate size left foraminal disc extrusion at L5-S1, likely impinging upon the exiting left L5 nerve root.     Additional lumbar spondylosis, resulting in mild neural foraminal narrowing at L1-2, L2-3, and L4-5, as above.  No spinal canal stenosis.    Allergies:   Review of patient's allergies indicates:   Allergen Reactions    Azathioprine Shortness Of Breath and Other (See Comments)     Fatigue       Current Medications:   Current Outpatient Medications   Medication Sig Dispense Refill    ACCU-CHEK FASTCLIX LANCING DEV Kit USE AS DIRECTED. 1 each 0    ALCOHOL ANTISEPTIC PADS (ALCOHOL PREP PADS TOP)       atorvastatin (LIPITOR) 20 MG tablet Take 1 tablet (20 mg total) by mouth once daily. 90 tablet 3    blood sugar diagnostic (ACCU-CHEK SMARTVIEW TEST STRIP) Strp Use as directed to check blood sugar twice daily. 200 strip 3    blood-glucose meter kit Use as instructed 1 each 0     carvediloL (COREG) 25 MG tablet Take 1 tablet (25 mg total) by mouth 2 (two) times daily. 180 tablet 0    cloNIDine (CATAPRES) 0.3 MG tablet Take 1 tablet (0.3 mg total) by mouth 3 (three) times daily. 270 tablet 1    diaper,brief,adult,disposable Misc       epoetin german-epbx (RETACRIT) 10,000 unit/mL imjection Inject 20,000 Units into the skin every 14 (fourteen) days.      fenofibrate 160 MG Tab Take 1 tablet by mouth once daily 90 tablet 3    FLUZONE HIGH-DOSE 2019-20, PF, 180 mcg/0.5 mL Syrg PHARMACIST ADMINISTERED IMMUNIZATION ADMINISTERED AT TIME OF DISPENSING  0    folic acid (FOLVITE) 1 MG tablet Take 1 tablet (1,000 mcg total) by mouth once daily. 90 tablet 0    gabapentin (NEURONTIN) 400 MG capsule TAKE 1 CAPSULE BY MOUTH THREE TIMES DAILY 90 capsule 1    HYDROcodone-acetaminophen (NORCO) 5-325 mg per tablet Take 1 tablet by mouth every 6 (six) hours as needed. 90 tablet 0    levothyroxine (SYNTHROID) 50 MCG tablet TAKE 1 TABLET BY MOUTH ONCE DAILY BEFORE BREAKFAST 90 tablet 1    metFORMIN (GLUCOPHAGE) 1000 MG tablet Take 1 tablet (1,000 mg total) by mouth 2 (two) times daily with meals. 180 tablet 1    NIFEdipine (PROCARDIA-XL) 30 MG (OSM) 24 hr tablet Take 1 tablet (30 mg total) by mouth once daily. 90 tablet 1    predniSONE (DELTASONE) 5 MG tablet Take 1.5 tablets (7.5 mg total) by mouth once daily. (Patient taking differently: Take 5 mg by mouth once daily. ) 135 tablet 1    tiZANidine (ZANAFLEX) 2 MG tablet TAKE 2 TABLETS BY MOUTH EVERY 8 HOURS AS NEEDED 270 tablet 0    calcium carbonate (OS-MALCOLM) 600 mg calcium (1,500 mg) Tab Take 1 tablet by mouth 2 (two) times daily.       oxybutynin (DITROPAN-XL) 5 MG TR24 Take 1 tablet (5 mg total) by mouth once daily. 30 tablet 11     No current facility-administered medications for this visit.      Facility-Administered Medications Ordered in Other Visits   Medication Dose Route Frequency Provider Last Rate Last Dose    0.9%  NaCl infusion  500  mL Intravenous Continuous Ever Resendez MD           REVIEW OF SYSTEMS:    GENERAL:  No weight loss, malaise or fevers.  HEENT:  Negative for frequent or significant headaches.  NECK:  Negative for lumps, goiter, pain and significant neck swelling.  RESPIRATORY:  Negative for cough, wheezing or shortness of breath.  CARDIOVASCULAR:  Negative for chest pain, leg swelling or palpitations. HTN  GI:  Negative for abdominal discomfort, blood in stools or black stools or change in bowel habits.  RENAL: CKD  MUSCULOSKELETAL:  See HPI.  SKIN:  Negative for lesions, rash, and itching.  PSYCH:  Negative for sleep disturbance, mood disorder and recent psychosocial stressors.  HEMATOLOGY/LYMPHOLOGY:  Negative for prolonged bleeding, bruising easily or swollen nodes.  NEURO:   No history of headaches, syncope, paralysis, seizures or tremors.  ENDO: Diabetes, thyroid disorder.   All other reviewed and negative other than HPI.    Past Medical History:  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, without long-term current use of insulin     Depression     Diabetes mellitus, type 2     Eye injury as a child     k-abrasion  od    Hyperlipidemia     Hypertension     Hypothyroidism     Immune deficiency disorder     Immune disorder     Myalgia and myositis 9/6/2012    Osteoporosis     Polyneuropathy     Renal manifestation of secondary diabetes mellitus     Sarcoidosis     Type 2 diabetes mellitus     Ulcer     no cancer    Urinary incontinence        Past Surgical History:  Past Surgical History:   Procedure Laterality Date    CARPAL TUNNEL RELEASE      Rt wrist    CATARACT EXTRACTION W/  INTRAOCULAR LENS  IMPLANT Right 2015    Dr. Azevedo    CATARACT EXTRACTION W/  INTRAOCULAR LENS IMPLANT Left 2015    Dr. Azevedo     SECTION      CHOLECYSTECTOMY      INJECTION OF JOINT Left 3/21/2019    Procedure: Injection, Joint  fLUOROSCOPIC jOINT iNJECTION (hIP iNJECTION) LEFT ROCH BURSA AS WELL LEFT TROCHANTERIC BURSA;  Surgeon: Alfonso Richards MD;  Location: [facility];  Service: Pain Management;  Laterality: Left;  NEEDS CONSENT, DIABETIC    INJECTION OF JOINT Left 2019    Procedure: Injection, Joint FLUOROSCOPIC JOINT INJECTION (HIP INJECTION) LEFT HIP;  Surgeon: Alfonso Richards MD;  Location: [facility];  Service: Pain Management;  Laterality: Left;  NEEDS CONSENT    INJECTION OF JOINT Left 2019    Procedure: INJECTION, JOINT;  Surgeon: Alfonso Richards MD;  Location: [facility];  Service: Pain Management;  Laterality: Left;  Left Hip and Left GTB Injections    INJECTION OF JOINT Left 2020    Procedure: INJECTION, JOINT, LEFT HIP and LEFT GREATER TROCHANTERIC BURSA;  Surgeon: Alfonso Richards MD;  Location: [facility];  Service: Pain Management;  Laterality: Left;  INJECTION, JOINT, LEFT HIP and LEFT GREATER TROCHANTERIC BURSA    TRANSFORAMINAL EPIDURAL INJECTION OF STEROID Bilateral 2019    Procedure: INJECTION, STEROID, EPIDURAL, TRANSFORAMINAL APPROACH;  Surgeon: Alfonso Richards MD;  Location: [facility];  Service: Pain Management;  Laterality: Bilateral;  B/L TF RHIANNA L5  Consent Needed    TRANSFORAMINAL EPIDURAL INJECTION OF STEROID Bilateral 2020    Procedure: INJECTION, STEROID, EPIDURAL, TRANSFORAMINAL APPROACH L5/S1;  Surgeon: Alfonso Richards MD;  Location: [facility];  Service: Pain Management;  Laterality: Bilateral;  B/L TF RHIANNA L5/S1    TUBAL LIGATION         Family History:  Family History   Problem Relation Age of Onset    Hypertension Mother     Cataracts Mother     No Known Problems Father     Hypertension Maternal Grandmother     Glaucoma Sister      Arthritis Sister     No Known Problems Brother     No Known Problems Maternal Aunt     No Known Problems Maternal Uncle     No Known Problems Paternal Aunt     No Known Problems Paternal Uncle     No Known Problems Maternal Grandfather     No Known Problems Paternal Grandmother     No Known Problems Paternal Grandfather     Kidney failure Sister     Hepatitis Sister     Cancer Sister         bone cancer     Immunodeficiency Sister     Diabetes Son     Hypertension Son     Lupus Neg Hx     Rheum arthritis Neg Hx     Amblyopia Neg Hx     Blindness Neg Hx     Macular degeneration Neg Hx     Retinal detachment Neg Hx     Strabismus Neg Hx     Stroke Neg Hx     Thyroid disease Neg Hx     Endometrial cancer Neg Hx     Vaginal cancer Neg Hx     Cervical cancer Neg Hx        Social History:  Social History     Socioeconomic History    Marital status:      Spouse name: Gasper     Number of children: 5    Years of education: Business school after high school    Highest education level: 12th grade   Occupational History    Not on file   Social Needs    Financial resource strain: Not very hard    Food insecurity     Worry: Never true     Inability: Never true    Transportation needs     Medical: No     Non-medical: No   Tobacco Use    Smoking status: Never Smoker    Smokeless tobacco: Never Used   Substance and Sexual Activity    Alcohol use: No    Drug use: No    Sexual activity: Not Currently     Partners: Male   Lifestyle    Physical activity     Days per week: 0 days     Minutes per session: 0 min    Stress: Not at all   Relationships    Social connections     Talks on phone: Once a week     Gets together: Once a week     Attends [employer] service: Never     Active member of club or organization: No     Attends meetings of clubs or organizations: Never     Relationship status:    Other Topics Concern    Not on file   Social History Narrative    Not on file  "      OBJECTIVE:    BP (!) 151/76   Pulse 75   Temp 98.1 °F (36.7 °C)   Resp 18   Ht 5' 1" (1.549 m)   Wt 56 kg (123 lb 7.3 oz)   LMP  (LMP Unknown)   SpO2 100%   BMI 23.33 kg/m²     PHYSICAL EXAMINATION:    General appearance: Well appearing, in no acute distress, alert and oriented x3.  Psych:  Mood and affect appropriate.  Skin: Skin color, texture, turgor normal, no rashes or lesions, in both upper and lower body.  Head/face:  Atraumatic, normocephalic. No palpable lymph nodes  Cor: RRR  Pulm: CTA  GI: Abdomen soft and non-tender.  Back: Straight leg raising in the sitting position is negative to radicular pain bilaterally. There is mild pain with palpation over lumbar facet joints. Limited ROM with mild pain on extension. Positive facet loading bilaterally.   Extremities: Peripheral joint ROM is full and pain free without obvious instability or laxity in all four extremities. No deformities, edema, or skin discoloration. Good capillary refill.  Musculoskeletal: Shoulder and knee provocative maneuvers are negative. There is pain with internal and external rotation of left hip. There is significant pain with palpation over left SI joint. FABERs is positive on the left. There is mild pain with palpation over left GTB. Bilateral lower extremity strength is normal and symmetric.  No atrophy or tone abnormalities are noted.  Neuro: Bilateral lower extremity coordination and muscle stretch reflexes are physiologic and symmetric.  Plantar response are downgoing. No loss of sensation is noted.  Gait: Antalgic- ambulates with electric scooter.     ASSESSMENT: 74 y.o. year old female with low back and hip pain, consistent with the followin. Sacroiliac joint pain     2. Lumbar spondylosis     3. DDD (degenerative disc disease), lumbar     4. Primary osteoarthritis of left hip     5. Greater trochanteric bursitis of left hip     6. Chronic pain disorder           PLAN:     - Previous imaging was reviewed " and discussed with the patient today.    - Schedule for left SI joint injection.      - She is s/p bilateral L5/S1 TF RHIANNA with benefit. We can repeat this as needed.     - Continue current medications.     - I have stressed the importance of physical activity and a home exercise plan to help with pain and improve health.    - RTC 2 weeks after above procedure.     - Counseled patient regarding the importance of activity modification and constant sleeping habits.    - Dr. Richards was consulted on the patient and agrees with this plan.    The above plan and management options were discussed at length with patient. Patient is in agreement with the above and verbalized understanding.    Jenny Balbuena NP  08/11/2020

## 2020-08-12 ENCOUNTER — OFFICE VISIT (OUTPATIENT)
Dept: RHEUMATOLOGY | Facility: CLINIC | Age: 75
End: 2020-08-12
Payer: MEDICARE

## 2020-08-12 VITALS
SYSTOLIC BLOOD PRESSURE: 156 MMHG | WEIGHT: 129.63 LBS | TEMPERATURE: 98 F | HEART RATE: 84 BPM | BODY MASS INDEX: 24.49 KG/M2 | DIASTOLIC BLOOD PRESSURE: 83 MMHG

## 2020-08-12 DIAGNOSIS — D86.9 SARCOIDOSIS: Primary | ICD-10-CM

## 2020-08-12 DIAGNOSIS — D84.9 IMMUNOSUPPRESSION: ICD-10-CM

## 2020-08-12 DIAGNOSIS — G72.9 MYOPATHY: ICD-10-CM

## 2020-08-12 DIAGNOSIS — D86.86 SARCOID ARTHROPATHY: ICD-10-CM

## 2020-08-12 PROCEDURE — 3077F PR MOST RECENT SYSTOLIC BLOOD PRESSURE >= 140 MM HG: ICD-10-PCS | Mod: HCNC,CPTII,S$GLB, | Performed by: INTERNAL MEDICINE

## 2020-08-12 PROCEDURE — 3077F SYST BP >= 140 MM HG: CPT | Mod: HCNC,CPTII,S$GLB, | Performed by: INTERNAL MEDICINE

## 2020-08-12 PROCEDURE — 99214 PR OFFICE/OUTPT VISIT, EST, LEVL IV, 30-39 MIN: ICD-10-PCS | Mod: HCNC,25,S$GLB, | Performed by: INTERNAL MEDICINE

## 2020-08-12 PROCEDURE — 1159F MED LIST DOCD IN RCRD: CPT | Mod: HCNC,S$GLB,, | Performed by: INTERNAL MEDICINE

## 2020-08-12 PROCEDURE — 1125F AMNT PAIN NOTED PAIN PRSNT: CPT | Mod: HCNC,S$GLB,, | Performed by: INTERNAL MEDICINE

## 2020-08-12 PROCEDURE — 3008F PR BODY MASS INDEX (BMI) DOCUMENTED: ICD-10-PCS | Mod: HCNC,CPTII,S$GLB, | Performed by: INTERNAL MEDICINE

## 2020-08-12 PROCEDURE — 96372 PR INJECTION,THERAP/PROPH/DIAG2ST, IM OR SUBCUT: ICD-10-PCS | Mod: HCNC,S$GLB,, | Performed by: INTERNAL MEDICINE

## 2020-08-12 PROCEDURE — 1159F PR MEDICATION LIST DOCUMENTED IN MEDICAL RECORD: ICD-10-PCS | Mod: HCNC,S$GLB,, | Performed by: INTERNAL MEDICINE

## 2020-08-12 PROCEDURE — 3008F BODY MASS INDEX DOCD: CPT | Mod: HCNC,CPTII,S$GLB, | Performed by: INTERNAL MEDICINE

## 2020-08-12 PROCEDURE — 99999 PR PBB SHADOW E&M-EST. PATIENT-LVL IV: CPT | Mod: PBBFAC,HCNC,, | Performed by: INTERNAL MEDICINE

## 2020-08-12 PROCEDURE — 3079F DIAST BP 80-89 MM HG: CPT | Mod: HCNC,CPTII,S$GLB, | Performed by: INTERNAL MEDICINE

## 2020-08-12 PROCEDURE — 1101F PT FALLS ASSESS-DOCD LE1/YR: CPT | Mod: HCNC,CPTII,S$GLB, | Performed by: INTERNAL MEDICINE

## 2020-08-12 PROCEDURE — 96372 THER/PROPH/DIAG INJ SC/IM: CPT | Mod: HCNC,S$GLB,, | Performed by: INTERNAL MEDICINE

## 2020-08-12 PROCEDURE — 99214 OFFICE O/P EST MOD 30 MIN: CPT | Mod: HCNC,25,S$GLB, | Performed by: INTERNAL MEDICINE

## 2020-08-12 PROCEDURE — 3079F PR MOST RECENT DIASTOLIC BLOOD PRESSURE 80-89 MM HG: ICD-10-PCS | Mod: HCNC,CPTII,S$GLB, | Performed by: INTERNAL MEDICINE

## 2020-08-12 PROCEDURE — 1125F PR PAIN SEVERITY QUANTIFIED, PAIN PRESENT: ICD-10-PCS | Mod: HCNC,S$GLB,, | Performed by: INTERNAL MEDICINE

## 2020-08-12 PROCEDURE — 1101F PR PT FALLS ASSESS DOC 0-1 FALLS W/OUT INJ PAST YR: ICD-10-PCS | Mod: HCNC,CPTII,S$GLB, | Performed by: INTERNAL MEDICINE

## 2020-08-12 PROCEDURE — 99999 PR PBB SHADOW E&M-EST. PATIENT-LVL IV: ICD-10-PCS | Mod: PBBFAC,HCNC,, | Performed by: INTERNAL MEDICINE

## 2020-08-12 RX ORDER — TRIAMCINOLONE ACETONIDE 40 MG/ML
80 INJECTION, SUSPENSION INTRA-ARTICULAR; INTRAMUSCULAR
Status: COMPLETED | OUTPATIENT
Start: 2020-08-12 | End: 2020-08-12

## 2020-08-12 RX ADMIN — TRIAMCINOLONE ACETONIDE 80 MG: 40 INJECTION, SUSPENSION INTRA-ARTICULAR; INTRAMUSCULAR at 11:08

## 2020-08-12 ASSESSMENT — ROUTINE ASSESSMENT OF PATIENT INDEX DATA (RAPID3)
TOTAL RAPID3 SCORE: 8.33
PATIENT GLOBAL ASSESSMENT SCORE: 10
WHEN YOU AWAKENED IN THE MORNING OVER THE LAST WEEK, PLEASE INDICATE THE AMOUNT OF TIME IT TAKES UNTIL YOU ARE AS LIMBER AS YOU WILL BE FOR THE DAY: 10-15 MINUTES
PSYCHOLOGICAL DISTRESS SCORE: 3.3
AM STIFFNESS SCORE: 1, YES
PAIN SCORE: 10
FATIGUE SCORE: 10
MDHAQ FUNCTION SCORE: 1.5

## 2020-08-12 NOTE — PROGRESS NOTES
Subjective:       Patient ID: Regino Lawrence is a 74 y.o. female.    Chief Complaint: Sarcoidosis    HPI:  Regino Lawrence is a 74 y.o. female with history of sarcoidosis with associated myopathy and   arthropathy. Sarcoidosis that was first manifested by muscle inflammation, low white   blood cell count, hair loss, skin involvement. She was treated in the   past with methotrexate and Plaquenil, both of which were ineffective.   Cellcept and Imuran caused some unknown side effect (she thinks it made her sick).   Colchicine was held due to low WBC.   Although methotrexate did not help in past it was retried and she felt it helped hair growth but did not help body aches.   She held MTX due to an URI but patient has not wanted restarted since then (2013).   Leflunomide was held due to elevated BP after less than a week of use.    Interval History:     Dr. Richards gave injection in back but did not help so will get a different type of injection.  Hands continue to swell and be difficult to open and close.    Pain 10/10 ache in hands, hips, left leg, and left foot.  Tizanidine helps some.     Currently on prednisone 5 mg.  She stopped MTX since it made her sick with fever, chills, tiredness, nausea, muscle pain.  .      Review of Systems   Constitutional: Positive for fatigue.   HENT:        Dry mouth   Eyes:        Dry eyes   Respiratory: Negative.  Negative for shortness of breath.    Cardiovascular: Negative.    Gastrointestinal: Negative.    Endocrine: Negative.    Genitourinary: Negative.    Musculoskeletal: Positive for arthralgias, back pain, joint swelling and myalgias.   Skin: Negative.    Allergic/Immunologic: Negative.    Neurological: Negative.    Hematological: Negative.    Psychiatric/Behavioral: Negative.          Objective:   BP (!) 156/83   Pulse 84   Temp 98.2 °F (36.8 °C)   Wt 58.8 kg (129 lb 10.1 oz)   LMP  (LMP Unknown)   BMI 24.49 kg/m²      Physical Exam   Constitutional: She is oriented to  person, place, and time and well-developed, well-nourished, and in no distress.   HENT:   Head: Normocephalic and atraumatic.   Eyes: Conjunctivae and EOM are normal.   Cardiovascular: Normal rate, regular rhythm and normal heart sounds.    Pulmonary/Chest: Effort normal and breath sounds normal.   Abdominal: Soft. Bowel sounds are normal.   Neurological: She is alert and oriented to person, place, and time.   In motorized scooter   Skin: Skin is warm and dry.     Psychiatric: Mood and affect normal.   Musculoskeletal:      Comments: 4.5/5 UE and LE proximal strength bilaterally unchanged  28 joint count: 22 tender (MCPs and PIPs and shoulders) and 10 swollen (all MCPs).    Pain on compression of left MTPs            LABS    Component      Latest Ref Rng & Units 12/6/2019 12/5/2019 10/19/2019   WBC      3.90 - 12.70 K/uL 10.16  4.58   RBC      4.00 - 5.40 M/uL 2.88 (L)  3.20 (L)   Hemoglobin      12.0 - 16.0 g/dL 9.7 (L)  11.1 (L)   Hematocrit      37.0 - 48.5 % 30.7 (L)  34.7 (L)   MCV      82 - 98 fL 107 (H)  108 (H)   MCH      27.0 - 31.0 pg 33.7 (H)  34.7 (H)   MCHC      32.0 - 36.0 g/dL 31.6 (L)  32.0   RDW      11.5 - 14.5 % 13.6  14.2   Platelets      150 - 350 K/uL 304  371 (H)   MPV      9.2 - 12.9 fL 9.3  8.9 (L)   Immature Granulocytes      0.0 - 0.5 % 0.6 (H)  1.5 (H)   Gran # (ANC)      1.8 - 7.7 K/uL 9.0 (H)  2.8   Immature Grans (Abs)      0.00 - 0.04 K/uL 0.06 (H)  0.07 (H)   Lymph #      1.0 - 4.8 K/uL 0.8 (L)  1.2   Mono #      0.3 - 1.0 K/uL 0.3  0.4   Eos #      0.0 - 0.5 K/uL 0.0  0.1   Baso #      0.00 - 0.20 K/uL 0.01  0.02   nRBC      0 /100 WBC 0  0   Gran%      38.0 - 73.0 % 88.7 (H)  61.9   Lymph%      18.0 - 48.0 % 7.4 (L)  27.1   Mono%      4.0 - 15.0 % 3.1 (L)  7.6   Eosinophil%      0.0 - 8.0 % 0.1  1.5   Basophil%      0.0 - 1.9 % 0.1  0.4   Differential Method       Automated  Automated   Sodium      136 - 145 mmol/L   139   Potassium      3.5 - 5.1 mmol/L   3.4 (L)   Chloride       95 - 110 mmol/L   103   CO2      23 - 29 mmol/L   25   Glucose      70 - 110 mg/dL   123 (H)   BUN, Bld      8 - 23 mg/dL   14   Creatinine      0.5 - 1.4 mg/dL   0.9   Calcium      8.7 - 10.5 mg/dL   9.7   PROTEIN TOTAL      6.0 - 8.4 g/dL   7.1   Albumin      3.5 - 5.2 g/dL   4.1   BILIRUBIN TOTAL      0.1 - 1.0 mg/dL   0.5   Alkaline Phosphatase      55 - 135 U/L   59   AST      10 - 40 U/L   15   ALT      10 - 44 U/L   16   Anion Gap      8 - 16 mmol/L   11   eGFR if African American      >60 mL/min/1.73 m:2   >60   eGFR if non African American      >60 mL/min/1.73 m:2   >60   Specimen UA         Urine, Clean Catch   Color, UA      Yellow, Straw, Leeanne   Straw   Appearance, UA      Clear   Clear   pH, UA      5.0 - 8.0   8.0   Specific Gravity, UA      1.005 - 1.030   1.010   Protein, UA      Negative   Negative   Glucose, UA      Negative   Negative   Ketones, UA      Negative   Negative   Bilirubin (UA)      Negative   Negative   Occult Blood UA      Negative   Negative   NITRITE UA      Negative   Negative   UROBILINOGEN UA      <2.0 EU/dL   Negative   Leukocytes, UA      Negative   Negative   CPK      20 - 180 U/L   100   CPK MB      0.1 - 6.5 ng/mL   1.2   MB%      0.0 - 5.0 %   1.2   Protime      9.0 - 12.5 sec   10.2   Coumadin Monitoring INR      0.8 - 1.2   1.0   CRP      0.0 - 8.2 mg/L   8.4 (H)   Sed Rate      0 - 20 mm/Hr   22 (H)   Aldolase      1.2 - 7.6 U/L      TSH      0.400 - 4.000 uIU/mL      Magnesium      1.6 - 2.6 mg/dL   1.5 (L)   Phosphorus      2.7 - 4.5 mg/dL   3.3   Troponin I      0.000 - 0.026 ng/mL   <0.006   aPTT      21.0 - 32.0 sec   27.0   POCT Glucose      70 - 110 mg/dL  119 (H)      Component      Latest Ref Rng & Units 10/18/2019 9/6/2019 8/29/2019   WBC      3.90 - 12.70 K/uL  7.15    RBC      4.00 - 5.40 M/uL  2.96 (L)    Hemoglobin      12.0 - 16.0 g/dL  10.0 (L)    Hematocrit      37.0 - 48.5 %  31.2 (L)    MCV      82 - 98 fL  105 (H)    MCH      27.0 - 31.0 pg  33.8  (H)    MCHC      32.0 - 36.0 g/dL  32.1    RDW      11.5 - 14.5 %  13.1    Platelets      150 - 350 K/uL  301    MPV      9.2 - 12.9 fL  8.6 (L)    Immature Granulocytes      0.0 - 0.5 %      Gran # (ANC)      1.8 - 7.7 K/uL  5.3    Immature Grans (Abs)      0.00 - 0.04 K/uL      Lymph #      1.0 - 4.8 K/uL  1.3    Mono #      0.3 - 1.0 K/uL  0.5    Eos #      0.0 - 0.5 K/uL  0.0    Baso #      0.00 - 0.20 K/uL  0.00    nRBC      0 /100 WBC      Gran%      38.0 - 73.0 %  75.0 (H)    Lymph%      18.0 - 48.0 %  18.0    Mono%      4.0 - 15.0 %  6.4    Eosinophil%      0.0 - 8.0 %  0.6    Basophil%      0.0 - 1.9 %  0.0    Differential Method        Automated    Sodium      136 - 145 mmol/L   139   Potassium      3.5 - 5.1 mmol/L   3.8   Chloride      95 - 110 mmol/L   104   CO2      23 - 29 mmol/L   27   Glucose      70 - 110 mg/dL   138 (H)   BUN, Bld      8 - 23 mg/dL   20   Creatinine      0.5 - 1.4 mg/dL   1.2   Calcium      8.7 - 10.5 mg/dL   9.3   PROTEIN TOTAL      6.0 - 8.4 g/dL   6.8   Albumin      3.5 - 5.2 g/dL   4.0   BILIRUBIN TOTAL      0.1 - 1.0 mg/dL   0.5   Alkaline Phosphatase      55 - 135 U/L   63   AST      10 - 40 U/L   17   ALT      10 - 44 U/L   13   Anion Gap      8 - 16 mmol/L   8   eGFR if African American      >60 mL/min/1.73 m:2   51.8 (A)   eGFR if non African American      >60 mL/min/1.73 m:2   44.9 (A)   Cholesterol      120 - 199 mg/dL 136     Triglycerides      30 - 150 mg/dL 174 (H)     HDL      40 - 75 mg/dL 54     LDL Cholesterol External      63.0 - 159.0 mg/dL 47.2 (L)     Hdl/Cholesterol Ratio      20.0 - 50.0 % 39.7     Total Cholesterol/HDL Ratio      2.0 - 5.0 2.5     Non-HDL Cholesterol      mg/dL 82     CPK      20 - 180 U/L   189 (H)   CRP      0.0 - 8.2 mg/L   3.8   Sed Rate      0 - 20 mm/Hr   3   Aldolase      1.2 - 7.6 U/L   3.9   TSH      0.400 - 4.000 uIU/mL 1.328            Assessment:       1.   Sarcoidosis. Manifested by myopathy and arthropathy. Persistent joint  pain and myalgias despite prednisone 5 mg.  Now with diffuse body pain and joint swelling again   2.   Myalgia and myositis.  History of fibromyalgia   3.   Osteopenia. Took Fosamax for 5 years stopped 6/2013.  Awaiting patient decision on Prolia after she sees dentis.    4.   Fatigue     5.   Diabetes mellitus type 2 in nonobese.  Last      6.           Neck pain. X-ray with degenerative changes. S/p laminectomy-cervical fusion C3-C7 11/16/2015 for cervical spinal stenosis.    7.           Back pain    8.           HTN.  9.           SOB.  When blood count low  10.         Anemia.  On Procrit    Plan:       1. Labs   2. Kenalog 80 mg IM.  Continue prednisone 5 mg daily daily.  Continue to hold  MTX.   3. [employer] stopped tramadol.  Still on hydrocodone/acetaminophen from primary doctor.  [employer] stopped Flexeril so switch to tizanidine.  Risk of sedation discussed.   4. Following with nephrology  5. DEXA with osteopenia of hip total and femoral neck. FRAX does not suggest treatment however if prednisone goes to 7.5 mg or more will consider Prolia (due renal insufficiency).  Information provided for patient to review.  She read information but did not see dentist to have teeth pulled.  6.  Follow with Dr. Conner regarding spine issues.                   RTO in 3-4 months.    Patient seen face to face for 25 minutes and greater than 50% spent in counseling regarding Joint pains,   management of sarcoidosis and pain.

## 2020-08-13 ENCOUNTER — OFFICE VISIT (OUTPATIENT)
Dept: PODIATRY | Facility: CLINIC | Age: 75
End: 2020-08-13
Payer: MEDICARE

## 2020-08-13 ENCOUNTER — PATIENT MESSAGE (OUTPATIENT)
Dept: RHEUMATOLOGY | Facility: CLINIC | Age: 75
End: 2020-08-13

## 2020-08-13 ENCOUNTER — PATIENT OUTREACH (OUTPATIENT)
Dept: ADMINISTRATIVE | Facility: HOSPITAL | Age: 75
End: 2020-08-13

## 2020-08-13 VITALS
BODY MASS INDEX: 24.47 KG/M2 | HEART RATE: 82 BPM | WEIGHT: 129.63 LBS | DIASTOLIC BLOOD PRESSURE: 90 MMHG | SYSTOLIC BLOOD PRESSURE: 120 MMHG | HEIGHT: 61 IN

## 2020-08-13 DIAGNOSIS — M20.5X2 HALLUX LIMITUS, ACQUIRED, LEFT: ICD-10-CM

## 2020-08-13 DIAGNOSIS — M79.672 FOOT PAIN, LEFT: ICD-10-CM

## 2020-08-13 DIAGNOSIS — E11.9 COMPREHENSIVE DIABETIC FOOT EXAMINATION, TYPE 2 DM, ENCOUNTER FOR: ICD-10-CM

## 2020-08-13 DIAGNOSIS — E11.49 TYPE II DIABETES MELLITUS WITH NEUROLOGICAL MANIFESTATIONS: Primary | ICD-10-CM

## 2020-08-13 DIAGNOSIS — M20.5X1 HALLUX LIMITUS, ACQUIRED, RIGHT: ICD-10-CM

## 2020-08-13 DIAGNOSIS — S93.402D INVERSION SPRAIN OF ANKLE, LEFT, SUBSEQUENT ENCOUNTER: ICD-10-CM

## 2020-08-13 DIAGNOSIS — M20.42 HAMMER TOES OF BOTH FEET: ICD-10-CM

## 2020-08-13 DIAGNOSIS — M20.41 HAMMER TOES OF BOTH FEET: ICD-10-CM

## 2020-08-13 DIAGNOSIS — M25.572 ARTHRALGIA OF LEFT FOOT: ICD-10-CM

## 2020-08-13 PROCEDURE — 1101F PT FALLS ASSESS-DOCD LE1/YR: CPT | Mod: HCNC,CPTII,S$GLB, | Performed by: PODIATRIST

## 2020-08-13 PROCEDURE — 1101F PR PT FALLS ASSESS DOC 0-1 FALLS W/OUT INJ PAST YR: ICD-10-PCS | Mod: HCNC,CPTII,S$GLB, | Performed by: PODIATRIST

## 2020-08-13 PROCEDURE — 1159F PR MEDICATION LIST DOCUMENTED IN MEDICAL RECORD: ICD-10-PCS | Mod: HCNC,S$GLB,, | Performed by: PODIATRIST

## 2020-08-13 PROCEDURE — 3074F SYST BP LT 130 MM HG: CPT | Mod: HCNC,CPTII,S$GLB, | Performed by: PODIATRIST

## 2020-08-13 PROCEDURE — 3074F PR MOST RECENT SYSTOLIC BLOOD PRESSURE < 130 MM HG: ICD-10-PCS | Mod: HCNC,CPTII,S$GLB, | Performed by: PODIATRIST

## 2020-08-13 PROCEDURE — 99999 PR PBB SHADOW E&M-EST. PATIENT-LVL IV: ICD-10-PCS | Mod: PBBFAC,HCNC,, | Performed by: PODIATRIST

## 2020-08-13 PROCEDURE — 3080F PR MOST RECENT DIASTOLIC BLOOD PRESSURE >= 90 MM HG: ICD-10-PCS | Mod: HCNC,CPTII,S$GLB, | Performed by: PODIATRIST

## 2020-08-13 PROCEDURE — 3008F PR BODY MASS INDEX (BMI) DOCUMENTED: ICD-10-PCS | Mod: HCNC,CPTII,S$GLB, | Performed by: PODIATRIST

## 2020-08-13 PROCEDURE — 99214 OFFICE O/P EST MOD 30 MIN: CPT | Mod: HCNC,S$GLB,, | Performed by: PODIATRIST

## 2020-08-13 PROCEDURE — 1125F PR PAIN SEVERITY QUANTIFIED, PAIN PRESENT: ICD-10-PCS | Mod: HCNC,S$GLB,, | Performed by: PODIATRIST

## 2020-08-13 PROCEDURE — 3080F DIAST BP >= 90 MM HG: CPT | Mod: HCNC,CPTII,S$GLB, | Performed by: PODIATRIST

## 2020-08-13 PROCEDURE — 1159F MED LIST DOCD IN RCRD: CPT | Mod: HCNC,S$GLB,, | Performed by: PODIATRIST

## 2020-08-13 PROCEDURE — 3044F HG A1C LEVEL LT 7.0%: CPT | Mod: HCNC,CPTII,S$GLB, | Performed by: PODIATRIST

## 2020-08-13 PROCEDURE — 99214 PR OFFICE/OUTPT VISIT, EST, LEVL IV, 30-39 MIN: ICD-10-PCS | Mod: HCNC,S$GLB,, | Performed by: PODIATRIST

## 2020-08-13 PROCEDURE — 3044F PR MOST RECENT HEMOGLOBIN A1C LEVEL <7.0%: ICD-10-PCS | Mod: HCNC,CPTII,S$GLB, | Performed by: PODIATRIST

## 2020-08-13 PROCEDURE — 1125F AMNT PAIN NOTED PAIN PRSNT: CPT | Mod: HCNC,S$GLB,, | Performed by: PODIATRIST

## 2020-08-13 PROCEDURE — 99999 PR PBB SHADOW E&M-EST. PATIENT-LVL IV: CPT | Mod: PBBFAC,HCNC,, | Performed by: PODIATRIST

## 2020-08-13 PROCEDURE — 3008F BODY MASS INDEX DOCD: CPT | Mod: HCNC,CPTII,S$GLB, | Performed by: PODIATRIST

## 2020-08-13 NOTE — PATIENT INSTRUCTIONS
Recommend lotions: eucerin, eucerin for diabetics, aquaphor, A&D ointment, gold bond for diabetics, sween, Otway's Bees all purpose baby ointment,  urea 40 with aloe (found on amazon.com)    Shoe recommendations: (try 6pm.com, zappos.com , nordstromrack.AddSearch, or shoes.AddSearch for discounted prices) you can visit [employer] in [place]  or [employer] in the [facility] (there are also several shoe brand outlets in the [facility])    Asics (GT 2000 or gel foundations), new balance stability type shoes, saucony (stabil c3),  [place] (GTS or Beast or transcend), propet (tennis shoe)    Sofft Brand or axxiom (women) Robert&Juan (men), clarks, crocs, aerosoles, naturalizers, SAS, ecco, born, zoey emmanuel, rockports (dress shoes)    Vionic, burkenstocks, fitflops, propet (sandals)  Nike comfort thong sandals, crocs, propet (house shoes)    Nail Home remedy:  Vicks Vapor rub to nails for easier manageability    Diabetes: Inspecting Your Feet  Diabetes increases your chances of developing foot problems. So inspect your feet every day. This helps you find small skin irritations before they become serious infections. If you have trouble seeing the bottoms of your feet, use a mirror or ask a family member or friend to help.     Pressure spots on the bottom of the foot are common areas where problems develop.   How to check your feet  Below are tips to help you look for foot problems. Try to check your feet at the same time each day, such as when you get out of bed in the morning:  · Check the top of each foot. The tops of toes, back of the heel, and outer edge of the foot can get a lot of rubbing from poor-fitting shoes.  · Check the bottom of each foot. Daily wear and tear often leads to problems at pressure spots.  · Check the toes and nails. Fungal infections often occur between toes. Toenail problems can also be a sign of fungal infections or lead to breaks in the skin.  · Check your shoes, too. Loose objects inside a shoe can  injure the foot. Use your hand to feel inside your shoes for things like bob, loose stitching, or rough areas that could irritate your skin.  Warning signs  Look for any color changes in the foot. Redness with streaks can signal a severe infection, which needs immediate medical attention. Tell your doctor right away if you have any of these problems:  · Swelling, sometimes with color changes, may be a sign of poor blood flow or infection. Symptoms include tenderness and an increase in the size of your foot.  · Warm or hot areas on your feet may be signs of infection. A foot that is cold may not be getting enough blood.  · Sensations such as burning, tingling, or pins and needles can be signs of a problem. Also check for areas that may be numb.  · Hot spots are caused by friction or pressure. Look for hot spots in areas that get a lot of rubbing. Hot spots can turn into blisters, calluses, or sores.  · Cracks and sores are caused by dry or irritated skin. They are a sign that the skin is breaking down, which can lead to infection.  · Toenail problems to watch for include nails growing into the skin (ingrown toenail) and causing redness or pain. Thick, yellow, or discolored nails can signal a fungal infection.  · Drainage and odor can develop from untreated sores and ulcers. Call your doctor right away if you notice white or yellow drainage, bleeding, or unpleasant odor.   © 6847-5732 [employer] [address] [place] [address]. All rights reserved. This information is not intended as a substitute for professional medical care. Always follow your healthcare professional's instructions.        Step-by-Step:  Inspecting Your Feet (Diabetes)    Date Last Reviewed: 10/1/2016  © 8910-1828 [employer] [address] [place] [address]. All rights reserved. This information is not intended as a substitute for professional medical care. Always follow your healthcare  professional's instructions.

## 2020-08-13 NOTE — PROGRESS NOTES
Subjective:      Patient ID: Regino Lawrence is a 74 y.o. female.    Chief Complaint: Diabetes Mellitus (ov 5/27/20 [facility] pcp), Foot Pain (bilateral foot/ left ankle pain- growth on right lateral 5th toe), and Nail Care    Regino is a 74 y.o. female who presents to the clinic for evaluation and treatment of high risk feet. Regino has a past medical history of Acid reflux, Allergy, Alopecia, Anemia, Anemia in CKD (chronic kidney disease) (9/22/2016), Anxiety, Arthritis, Back pain, Cataract, Chronic kidney disease, 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, without long-term current use of insulin, Depression, Diabetes mellitus, type 2, Eye injury (as a child ), Hyperlipidemia, Hypertension, Hypothyroidism, Immune deficiency disorder, Immune disorder, Myalgia and myositis (9/6/2012), Osteoporosis, Polyneuropathy, Renal manifestation of secondary diabetes mellitus, Sarcoidosis, Type 2 diabetes mellitus, Ulcer, and Urinary incontinence. The patient's chief complaint is  left foot midpain. Description: severe Nature: aching, sharp and throbbing Location: midfoot and ankles Onset of the symptoms was several months ago. Precipitating event: hip pain.  History of injury: no Current symptoms include: worsening symptoms after a period of activity. Aggravating factors: any weight bearing. Alleviating factors: rest Symptoms have gradually worsened. Patient has had prior foot problems. Evaluation to date: seen by neurology, neurosurgery, pain management. Treatment to date: steroid injections to the hip, rx shoes. Patients rates pain 10/10 on pain scale.   This patient has documented high risk feet requiring routine maintenance secondary to diabetes mellitis and those secondary complications of diabetes, as mentioned..    03/28/19 She has been wearing compression socks and tennis shoes and relates complete improvement to the midfoot and minimal to no  "improvement to the ankle.    06/13/19 She has been wearing compression socks and new AFO and tennis shoes and relates complete improvement to the midfoot and to the ankle with use of brace.  Patient has been suffering with "left hip bursitis" and back pain which aggravates the foot.       08/13/20 Patient presents to clinic for continued pain to left foot and ankle.  She relates that she has significant pain to multiple joints of the LLE being manages by pain management and rheumatology. She relates that compression stocking provide some relief.       PCP: Micaela Mendoza MD    Date Last Seen by PCP:   Chief Complaint   Patient presents with    Diabetes Mellitus     ov 5/27/20 [facility] pcp    Foot Pain     bilateral foot/ left ankle pain- growth on right lateral 5th toe    Nail Care     Current shoe gear:  rx shoes     Hemoglobin A1C   Date Value Ref Range Status   05/22/2020 6.0 (H) 4.0 - 5.6 % Final     Comment:     ADA Screening Guidelines:  5.7-6.4%  Consistent with prediabetes  >or=6.5%  Consistent with diabetes  High levels of fetal hemoglobin interfere with the HbA1C  assay. Heterozygous hemoglobin variants (HbS, HgC, etc)do  not significantly interfere with this assay.   However, presence of multiple variants may affect accuracy.     09/20/2019 6.2 (H) 4.0 - 5.6 % Final     Comment:     ADA Screening Guidelines:  5.7-6.4%  Consistent with prediabetes  >or=6.5%  Consistent with diabetes  High levels of fetal hemoglobin interfere with the HbA1C  assay. Heterozygous hemoglobin variants (HbS, HgC, etc)do  not significantly interfere with this assay.   However, presence of multiple variants may affect accuracy.     03/12/2019 6.1 (H) 4.0 - 5.6 % Final     Comment:     ADA Screening Guidelines:  5.7-6.4%  Consistent with prediabetes  >or=6.5%  Consistent with diabetes  High levels of fetal hemoglobin interfere with the HbA1C  assay. Heterozygous hemoglobin variants (HbS, HgC, etc)do  not significantly interfere " with this assay.   However, presence of multiple variants may affect accuracy.       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, without long-term current use of insulin    Sarcoidosis    Corneal scar, right eye    Nuclear sclerosis    Senile nuclear sclerosis    Immunosuppression    Left shoulder pain    Cervical spinal stenosis    Patient is Amish    GERD (gastroesophageal reflux disease)    Debility    S/P cervical spinal fusion    Chronic bilateral low back pain with left-sided sciatica    Degenerative disc disease, lumbar    Poor motor control of trunk    Impaired mobility    Muscle weakness    Iron deficiency anemia    Anemia in CKD (chronic kidney disease)    Refusal of blood transfusions as patient is Amish    Current use of steroid medication    DM type 2 without retinopathy    Pseudophakia    Insufficiency of tear film of both eyes    Refractive error    Myopathy    Osteopenia    Calcification of aorta    Dry eye syndrome, bilateral    Neck pain    Lumbar disc herniation with radiculopathy    Antalgic gait    Lumbar radiculopathy    Lumbar stenosis with neurogenic claudication    Anemia in chronic kidney disease    Greater trochanteric bursitis of left hip    Left hip pain    FAUSTIN (dyspnea on exertion)    Chest pain, atypical    Hemispheric retinal vein occlusion with macular edema of left eye    Degenerative joint disease (DJD) of hip    Chronic pain    Left sided numbness    Sacroiliitis    Chronic, continuous use of opioids    Osteoarthritis of hip     Current  Outpatient Medications on File Prior to Visit   Medication Sig Dispense Refill    ACCU-CHEK FASTCLIX LANCING DEV Kit USE AS DIRECTED. 1 each 0    ALCOHOL ANTISEPTIC PADS (ALCOHOL PREP PADS TOP)       atorvastatin (LIPITOR) 20 MG tablet Take 1 tablet (20 mg total) by mouth once daily. 90 tablet 3    blood sugar diagnostic (ACCU-CHEK SMARTVIEW TEST STRIP) Strp Use as directed to check blood sugar twice daily. 200 strip 3    blood-glucose meter kit Use as instructed 1 each 0    calcium carbonate (OS-MALCOLM) 600 mg calcium (1,500 mg) Tab Take 1 tablet by mouth 2 (two) times daily.       carvediloL (COREG) 25 MG tablet Take 1 tablet (25 mg total) by mouth 2 (two) times daily. 180 tablet 0    cloNIDine (CATAPRES) 0.3 MG tablet Take 1 tablet (0.3 mg total) by mouth 3 (three) times daily. 270 tablet 1    diaper,brief,adult,disposable Misc       epoetin german-epbx (RETACRIT) 10,000 unit/mL imjection Inject 20,000 Units into the skin every 14 (fourteen) days.      fenofibrate 160 MG Tab Take 1 tablet by mouth once daily 90 tablet 3    FLUZONE HIGH-DOSE 2019-20, PF, 180 mcg/0.5 mL Syrg PHARMACIST ADMINISTERED IMMUNIZATION ADMINISTERED AT TIME OF DISPENSING  0    folic acid (FOLVITE) 1 MG tablet Take 1 tablet (1,000 mcg total) by mouth once daily. 90 tablet 0    gabapentin (NEURONTIN) 400 MG capsule TAKE 1 CAPSULE BY MOUTH THREE TIMES DAILY 90 capsule 1    HYDROcodone-acetaminophen (NORCO) 5-325 mg per tablet Take 1 tablet by mouth every 6 (six) hours as needed. 90 tablet 0    levothyroxine (SYNTHROID) 50 MCG tablet TAKE 1 TABLET BY MOUTH ONCE DAILY BEFORE BREAKFAST 90 tablet 1    metFORMIN (GLUCOPHAGE) 1000 MG tablet Take 1 tablet (1,000 mg total) by mouth 2 (two) times daily with meals. 180 tablet 1    NIFEdipine (PROCARDIA-XL) 30 MG (OSM) 24 hr tablet Take 1 tablet (30 mg total) by mouth once daily. 90 tablet 1    oxybutynin (DITROPAN-XL) 5 MG TR24 Take 1 tablet (5 mg total) by mouth once daily. 30 tablet 11     predniSONE (DELTASONE) 5 MG tablet Take 1.5 tablets (7.5 mg total) by mouth once daily. (Patient taking differently: Take 5 mg by mouth once daily. ) 135 tablet 1    tiZANidine (ZANAFLEX) 2 MG tablet TAKE 2 TABLETS BY MOUTH EVERY 8 HOURS AS NEEDED 270 tablet 0     Current Facility-Administered Medications on File Prior to Visit   Medication Dose Route Frequency Provider Last Rate Last Dose    0.9%  NaCl infusion  500 mL Intravenous Continuous Ever Resendez MD         Review of patient's allergies indicates:   Allergen Reactions    Azathioprine Shortness Of Breath and Other (See Comments)     Fatigue     Past Surgical History:   Procedure Laterality Date    CARPAL TUNNEL RELEASE      Rt wrist    CATARACT EXTRACTION W/  INTRAOCULAR LENS IMPLANT Right 2015    Dr. Azevedo    CATARACT EXTRACTION W/  INTRAOCULAR LENS IMPLANT Left 2015    Dr. Azevedo     SECTION      CHOLECYSTECTOMY      INJECTION OF JOINT Left 3/21/2019    Procedure: Injection, Joint  fLUOROSCOPIC jOINT iNJECTION (hIP iNJECTION) LEFT ROCH BURSA AS WELL LEFT TROCHANTERIC BURSA;  Surgeon: Alfonso Richards MD;  Location: [facility];  Service: Pain Management;  Laterality: Left;  NEEDS CONSENT, DIABETIC    INJECTION OF JOINT Left 2019    Procedure: Injection, Joint FLUOROSCOPIC JOINT INJECTION (HIP INJECTION) LEFT HIP;  Surgeon: Alfonso Richards MD;  Location: [facility];  Service: Pain Management;  Laterality: Left;  NEEDS CONSENT    INJECTION OF JOINT Left 2019    Procedure: INJECTION, JOINT;  Surgeon: Alfonso Richards MD;  Location: [facility];  Service: Pain Management;  Laterality: Left;  Left Hip and Left GTB Injections    INJECTION OF JOINT Left 2020    Procedure: INJECTION, JOINT, LEFT HIP and LEFT GREATER TROCHANTERIC BURSA;  Surgeon: Alfonso Richards MD;  Location: [facility];  Service: Pain Management;  Laterality: Left;  INJECTION, JOINT, LEFT HIP and LEFT GREATER TROCHANTERIC BURSA     TRANSFORAMINAL EPIDURAL INJECTION OF STEROID Bilateral 12/5/2019    Procedure: INJECTION, STEROID, EPIDURAL, TRANSFORAMINAL APPROACH;  Surgeon: Alfonso Richards MD;  Location: [facility];  Service: Pain Management;  Laterality: Bilateral;  B/L TF RHIANNA L5  Consent Needed    TRANSFORAMINAL EPIDURAL INJECTION OF STEROID Bilateral 6/29/2020    Procedure: INJECTION, STEROID, EPIDURAL, TRANSFORAMINAL APPROACH L5/S1;  Surgeon: Alfonso Richards MD;  Location: [facility];  Service: Pain Management;  Laterality: Bilateral;  B/L TF RHIANNA L5/S1    TUBAL LIGATION       Family History   Problem Relation Age of Onset    Hypertension Mother     Cataracts Mother     No Known Problems Father     Hypertension Maternal Grandmother     Glaucoma Sister     Arthritis Sister     No Known Problems Brother     No Known Problems Maternal Aunt     No Known Problems Maternal Uncle     No Known Problems Paternal Aunt     No Known Problems Paternal Uncle     No Known Problems Maternal Grandfather     No Known Problems Paternal Grandmother     No Known Problems Paternal Grandfather     Kidney failure Sister     Hepatitis Sister     Cancer Sister         bone cancer     Immunodeficiency Sister     Diabetes Son     Hypertension Son     Lupus Neg Hx     Rheum arthritis Neg Hx     Amblyopia Neg Hx     Blindness Neg Hx     Macular degeneration Neg Hx     Retinal detachment Neg Hx     Strabismus Neg Hx     Stroke Neg Hx     Thyroid disease Neg Hx     Endometrial cancer Neg Hx     Vaginal cancer Neg Hx     Cervical cancer Neg Hx      Social History     Socioeconomic History    Marital status:      Spouse name: Gasper     Number of children: 5    Years of education: Business school after high school    Highest education level: 12th grade   Occupational History    Not on file   Social Needs    Financial resource strain: Not very hard    Food insecurity     Worry: Never true     Inability: Never true     "Transportation needs     Medical: No     Non-medical: No   Tobacco Use    Smoking status: Never Smoker    Smokeless tobacco: Never Used   Substance and Sexual Activity    Alcohol use: No    Drug use: No    Sexual activity: Not Currently     Partners: Male   Lifestyle    Physical activity     Days per week: 0 days     Minutes per session: 0 min    Stress: Not at all   Relationships    Social connections     Talks on phone: Once a week     Gets together: Once a week     Attends Christianity service: Never     Active member of club or organization: No     Attends meetings of clubs or organizations: Never     Relationship status:    Other Topics Concern    Not on file   Social History Narrative    Not on file       Review of Systems   Constitution: Negative for chills and fever.        She feels tired and weak   Cardiovascular: Negative for chest pain, claudication and leg swelling.   Respiratory: Negative for cough and shortness of breath.    Skin: Positive for dry skin and nail changes. Negative for itching and rash.   Musculoskeletal: Positive for arthritis, back pain, joint pain, muscle weakness, myalgias and neck pain. Negative for falls and joint swelling.   Gastrointestinal: Positive for nausea. Negative for diarrhea and vomiting.   Neurological: Positive for loss of balance, numbness, paresthesias, sensory change and weakness. Negative for tremors.   Psychiatric/Behavioral: Negative for altered mental status and hallucinations.           Objective:       Vitals:    08/13/20 1112   BP: (!) 120/90   Pulse: 82   Weight: 58.8 kg (129 lb 10.1 oz)   Height: 5' 1" (1.549 m)   PainSc: 10-Worst pain ever       Physical Exam   Constitutional:   General: Pt. is well-developed, well-nourished, appears stated age, in no acute distress, alert and oriented x 3. No evidence of depression, anxiety, or agitation. Calm, cooperative, and communicative. Appropriate interactions and affect.       Cardiovascular: "   Pulses:       Dorsalis pedis pulses are 1+ on the right side and 1+ on the left side.        Posterior tibial pulses are 1+ on the right side and 1+ on the left side.   There is decreased digital hair. Skin is atrophic, hyperpigmented, and mildly edematous       Musculoskeletal:      Right ankle: She exhibits swelling. No tenderness. Achilles tendon exhibits no pain, no defect and normal Naik's test results.      Left ankle: She exhibits swelling. Tenderness. AITFL and CF ligament tenderness found. Achilles tendon exhibits no pain and no defect.      Right foot: Decreased range of motion. Tenderness present.      Left foot: Decreased range of motion. Tenderness (midfoot) and crepitus (midfoot) present.      Comments: 5/5 muscle strength Bilaterally. There is some limitation of dorsiflexion with knees extended Bilaterally, adequate with knees flexed.     Decreased stride, station of gait.  apropulsive toe off.  Increased angle and base of gait.    Patient has hammertoes of digits 2-5 bilateral partially reducible without symptom today.    Tailor bunion present 5th mtpj right with medial deviation of 5th toe, prominent bony bump lateral 5th mtpj, and pain to palpation without evidence of trauma or infection.   Neurological: A sensory deficit is present.   Edelstein-Ingrid 5.07 monofilamant testing is diminished Ted feet. Sharp/dull sensation diminished Bilaterally.   Skin: Skin is warm, dry and intact. No abrasion, no ecchymosis, no lesion and no rash noted. She is not diaphoretic. No cyanosis or erythema. No pallor. Nails show no clubbing.   Toenails 1-5 bilaterally are thickened by 2-3 mm, discolored/yellowed, dystrophic, brittle with subungual debris.    Interdigital Spaces clean, dry and without evidence of break in skin integrity   Psychiatric: She has a normal mood and affect. Her speech is normal.   Nursing note and vitals reviewed.        Assessment:       Encounter Diagnoses   Name Primary?    Type II  diabetes mellitus with neurological manifestations Yes    Comprehensive diabetic foot examination, type 2 DM, encounter for     Foot pain, left     Arthralgia of left foot     Inversion sprain of ankle, left, subsequent encounter     Hammer toes of both feet     Hallux limitus, acquired, left     Hallux limitus, acquired, right          Plan:       Regino was seen today for diabetes mellitus, foot pain and nail care.    Diagnoses and all orders for this visit:    Type II diabetes mellitus with neurological manifestations  -     DIABETIC SHOES FOR HOME USE    Comprehensive diabetic foot examination, type 2 DM, encounter for    Foot pain, left    Arthralgia of left foot  -     DIABETIC SHOES FOR HOME USE    Inversion sprain of ankle, left, subsequent encounter  -     DIABETIC SHOES FOR HOME USE    Hammer toes of both feet  -     DIABETIC SHOES FOR HOME USE    Hallux limitus, acquired, left  -     DIABETIC SHOES FOR HOME USE    Hallux limitus, acquired, right  -     DIABETIC SHOES FOR HOME USE      I counseled the patient on her conditions, their implications and medical management.    Greater than 50% of this visit spent on counseling and coordination of care.    Pain is likely secondary to arthralgias of the LLE.     Patient education on arthralgias of the foot. Discussed non-surgical treatment options, including injection, supportive shoegear, inserts.     Discussed ankle sprains and importance of immobilization for healing as well as the lengthy course of treatment for complete resolution. She likely has chronic swelling and inflammation of this ankle.    Tubigrip to LLE; patient is to elevate legs. When sleeping, place a pillow under lower extremities. When sitting, support the legs so that they are level with the waist.    Continue diabetic shoes for protection and support, however patient to return to orthotist regarding shoe fit. rx for shoe modification to eliminate any potential blistering or  ulceration.    Patient instructed to rest affected limb, avoid excessive WB and use crutch or walker assistance if needed.     Instructions on elevation to reduce pain and swelling. When sleeping, place a pillow under the injured leg. When sitting, support the injured leg so it is level with your waist.  Patient instructed on adequate icing techniques. Patient should ice the affected area at least once per day x 10 minutes for 10 days . I advised the  patient that extra icing would also be beneficial to ensure adequate anti inflammatory effect     Continue Rx topical pain cream from [facility]    Will defer to pain management and rheumatology    RTC PRN

## 2020-08-14 ENCOUNTER — INFUSION (OUTPATIENT)
Dept: INFUSION THERAPY | Facility: HOSPITAL | Age: 75
End: 2020-08-14
Attending: INTERNAL MEDICINE
Payer: MEDICARE

## 2020-08-14 VITALS
HEART RATE: 80 BPM | SYSTOLIC BLOOD PRESSURE: 156 MMHG | RESPIRATION RATE: 17 BRPM | OXYGEN SATURATION: 97 % | DIASTOLIC BLOOD PRESSURE: 73 MMHG | TEMPERATURE: 97 F

## 2020-08-14 DIAGNOSIS — Z53.1 REFUSAL OF BLOOD TRANSFUSIONS AS PATIENT IS JEHOVAH'S WITNESS: ICD-10-CM

## 2020-08-14 DIAGNOSIS — D50.9 IRON DEFICIENCY ANEMIA, UNSPECIFIED IRON DEFICIENCY ANEMIA TYPE: ICD-10-CM

## 2020-08-14 DIAGNOSIS — D63.1 ANEMIA IN CHRONIC KIDNEY DISEASE, UNSPECIFIED CKD STAGE: Primary | ICD-10-CM

## 2020-08-14 DIAGNOSIS — N18.9 ANEMIA IN CHRONIC KIDNEY DISEASE, UNSPECIFIED CKD STAGE: Primary | ICD-10-CM

## 2020-08-14 DIAGNOSIS — I10 ESSENTIAL HYPERTENSION: Chronic | ICD-10-CM

## 2020-08-14 PROCEDURE — 96372 THER/PROPH/DIAG INJ SC/IM: CPT | Mod: HCNC

## 2020-08-14 PROCEDURE — 63600175 PHARM REV CODE 636 W HCPCS: Mod: HCNC | Performed by: INTERNAL MEDICINE

## 2020-08-14 RX ADMIN — EPOETIN ALFA-EPBX 20000 UNITS: 10000 INJECTION, SOLUTION INTRAVENOUS; SUBCUTANEOUS at 11:08

## 2020-08-15 RX ORDER — NIFEDIPINE 30 MG/1
TABLET, EXTENDED RELEASE ORAL
Qty: 90 TABLET | Refills: 1 | Status: SHIPPED | OUTPATIENT
Start: 2020-08-15 | End: 2021-02-04 | Stop reason: SDUPTHER

## 2020-08-21 ENCOUNTER — HOSPITAL ENCOUNTER (OUTPATIENT)
Dept: RADIOLOGY | Facility: CLINIC | Age: 75
Discharge: HOME | End: 2020-08-21
Attending: NURSE PRACTITIONER
Payer: MEDICARE

## 2020-08-21 DIAGNOSIS — M81.0 OSTEOPOROSIS, UNSPECIFIED OSTEOPOROSIS TYPE, UNSPECIFIED PATHOLOGICAL FRACTURE PRESENCE: ICD-10-CM

## 2020-08-21 PROCEDURE — 77080 DXA BONE DENSITY AXIAL: CPT | Mod: 26,HCNC,, | Performed by: INTERNAL MEDICINE

## 2020-08-21 PROCEDURE — 77080 DEXA BONE DENSITY SPINE HIP: ICD-10-PCS | Mod: 26,HCNC,, | Performed by: INTERNAL MEDICINE

## 2020-08-21 PROCEDURE — 77080 DXA BONE DENSITY AXIAL: CPT | Mod: TC,HCNC

## 2020-08-25 NOTE — PROGRESS NOTES
Your bone density shows that it has improved. We can continue your current management and repeat your test in two years    Micaela Mendoza MD

## 2020-08-28 ENCOUNTER — LAB VISIT (OUTPATIENT)
Dept: LAB | Facility: HOSPITAL | Age: 75
End: 2020-08-28
Attending: INTERNAL MEDICINE
Payer: MEDICARE

## 2020-08-28 DIAGNOSIS — N18.9 ANEMIA IN CKD (CHRONIC KIDNEY DISEASE): ICD-10-CM

## 2020-08-28 DIAGNOSIS — D63.1 ANEMIA IN CHRONIC KIDNEY DISEASE, UNSPECIFIED CKD STAGE: ICD-10-CM

## 2020-08-28 DIAGNOSIS — N18.9 ANEMIA IN CHRONIC KIDNEY DISEASE, UNSPECIFIED CKD STAGE: ICD-10-CM

## 2020-08-28 DIAGNOSIS — D63.1 ANEMIA IN CKD (CHRONIC KIDNEY DISEASE): ICD-10-CM

## 2020-08-28 LAB
ALBUMIN SERPL BCP-MCNC: 4 G/DL (ref 3.5–5.2)
ALP SERPL-CCNC: 51 U/L (ref 55–135)
ALT SERPL W/O P-5'-P-CCNC: 12 U/L (ref 10–44)
ANION GAP SERPL CALC-SCNC: 7 MMOL/L (ref 8–16)
AST SERPL-CCNC: 13 U/L (ref 10–40)
BILIRUB SERPL-MCNC: 0.5 MG/DL (ref 0.1–1)
BUN SERPL-MCNC: 17 MG/DL (ref 8–23)
CALCIUM SERPL-MCNC: 9.4 MG/DL (ref 8.7–10.5)
CHLORIDE SERPL-SCNC: 104 MMOL/L (ref 95–110)
CO2 SERPL-SCNC: 28 MMOL/L (ref 23–29)
CREAT SERPL-MCNC: 1.1 MG/DL (ref 0.5–1.4)
ERYTHROCYTE [DISTWIDTH] IN BLOOD BY AUTOMATED COUNT: 13.2 % (ref 11.5–14.5)
EST. GFR  (AFRICAN AMERICAN): 57 ML/MIN/1.73 M^2
EST. GFR  (NON AFRICAN AMERICAN): 50 ML/MIN/1.73 M^2
GLUCOSE SERPL-MCNC: 172 MG/DL (ref 70–110)
HCT VFR BLD AUTO: 36.9 % (ref 37–48.5)
HGB BLD-MCNC: 11.8 G/DL (ref 12–16)
IMM GRANULOCYTES # BLD AUTO: 0.03 K/UL (ref 0–0.04)
MCH RBC QN AUTO: 31.8 PG (ref 27–31)
MCHC RBC AUTO-ENTMCNC: 32 G/DL (ref 32–36)
MCV RBC AUTO: 100 FL (ref 82–98)
NEUTROPHILS # BLD AUTO: 3.5 K/UL (ref 1.8–7.7)
PLATELET # BLD AUTO: 229 K/UL (ref 150–350)
PMV BLD AUTO: 9.2 FL (ref 9.2–12.9)
POTASSIUM SERPL-SCNC: 3.8 MMOL/L (ref 3.5–5.1)
PROT SERPL-MCNC: 6.9 G/DL (ref 6–8.4)
RBC # BLD AUTO: 3.71 M/UL (ref 4–5.4)
SODIUM SERPL-SCNC: 139 MMOL/L (ref 136–145)
WBC # BLD AUTO: 5.06 K/UL (ref 3.9–12.7)

## 2020-08-28 PROCEDURE — 36415 COLL VENOUS BLD VENIPUNCTURE: CPT | Mod: HCNC

## 2020-08-28 PROCEDURE — 80053 COMPREHEN METABOLIC PANEL: CPT | Mod: HCNC

## 2020-08-28 PROCEDURE — 85027 COMPLETE CBC AUTOMATED: CPT | Mod: HCNC

## 2020-08-31 ENCOUNTER — OFFICE VISIT (OUTPATIENT)
Dept: HEMATOLOGY/ONCOLOGY | Facility: CLINIC | Age: 75
End: 2020-08-31
Payer: MEDICARE

## 2020-08-31 VITALS
HEIGHT: 61 IN | HEART RATE: 83 BPM | DIASTOLIC BLOOD PRESSURE: 75 MMHG | TEMPERATURE: 97 F | BODY MASS INDEX: 22.94 KG/M2 | SYSTOLIC BLOOD PRESSURE: 136 MMHG | OXYGEN SATURATION: 96 % | WEIGHT: 121.5 LBS

## 2020-08-31 DIAGNOSIS — N18.9 ANEMIA IN CHRONIC KIDNEY DISEASE, UNSPECIFIED CKD STAGE: Primary | ICD-10-CM

## 2020-08-31 DIAGNOSIS — D63.1 ANEMIA IN CHRONIC KIDNEY DISEASE, UNSPECIFIED CKD STAGE: Primary | ICD-10-CM

## 2020-08-31 DIAGNOSIS — N18.9 CHRONIC KIDNEY DISEASE, UNSPECIFIED CKD STAGE: ICD-10-CM

## 2020-08-31 PROCEDURE — 1159F MED LIST DOCD IN RCRD: CPT | Mod: HCNC,S$GLB,, | Performed by: INTERNAL MEDICINE

## 2020-08-31 PROCEDURE — 3008F BODY MASS INDEX DOCD: CPT | Mod: HCNC,CPTII,S$GLB, | Performed by: INTERNAL MEDICINE

## 2020-08-31 PROCEDURE — 3078F DIAST BP <80 MM HG: CPT | Mod: HCNC,CPTII,S$GLB, | Performed by: INTERNAL MEDICINE

## 2020-08-31 PROCEDURE — 1125F AMNT PAIN NOTED PAIN PRSNT: CPT | Mod: HCNC,S$GLB,, | Performed by: INTERNAL MEDICINE

## 2020-08-31 PROCEDURE — 1125F PR PAIN SEVERITY QUANTIFIED, PAIN PRESENT: ICD-10-PCS | Mod: HCNC,S$GLB,, | Performed by: INTERNAL MEDICINE

## 2020-08-31 PROCEDURE — 3078F PR MOST RECENT DIASTOLIC BLOOD PRESSURE < 80 MM HG: ICD-10-PCS | Mod: HCNC,CPTII,S$GLB, | Performed by: INTERNAL MEDICINE

## 2020-08-31 PROCEDURE — 99999 PR PBB SHADOW E&M-EST. PATIENT-LVL IV: ICD-10-PCS | Mod: PBBFAC,HCNC,, | Performed by: INTERNAL MEDICINE

## 2020-08-31 PROCEDURE — 3008F PR BODY MASS INDEX (BMI) DOCUMENTED: ICD-10-PCS | Mod: HCNC,CPTII,S$GLB, | Performed by: INTERNAL MEDICINE

## 2020-08-31 PROCEDURE — 1101F PR PT FALLS ASSESS DOC 0-1 FALLS W/OUT INJ PAST YR: ICD-10-PCS | Mod: HCNC,CPTII,S$GLB, | Performed by: INTERNAL MEDICINE

## 2020-08-31 PROCEDURE — 3075F SYST BP GE 130 - 139MM HG: CPT | Mod: HCNC,CPTII,S$GLB, | Performed by: INTERNAL MEDICINE

## 2020-08-31 PROCEDURE — 99999 PR PBB SHADOW E&M-EST. PATIENT-LVL IV: CPT | Mod: PBBFAC,HCNC,, | Performed by: INTERNAL MEDICINE

## 2020-08-31 PROCEDURE — 1159F PR MEDICATION LIST DOCUMENTED IN MEDICAL RECORD: ICD-10-PCS | Mod: HCNC,S$GLB,, | Performed by: INTERNAL MEDICINE

## 2020-08-31 PROCEDURE — 99214 OFFICE O/P EST MOD 30 MIN: CPT | Mod: HCNC,S$GLB,, | Performed by: INTERNAL MEDICINE

## 2020-08-31 PROCEDURE — 1101F PT FALLS ASSESS-DOCD LE1/YR: CPT | Mod: HCNC,CPTII,S$GLB, | Performed by: INTERNAL MEDICINE

## 2020-08-31 PROCEDURE — 3075F PR MOST RECENT SYSTOLIC BLOOD PRESS GE 130-139MM HG: ICD-10-PCS | Mod: HCNC,CPTII,S$GLB, | Performed by: INTERNAL MEDICINE

## 2020-08-31 PROCEDURE — 99214 PR OFFICE/OUTPT VISIT, EST, LEVL IV, 30-39 MIN: ICD-10-PCS | Mod: HCNC,S$GLB,, | Performed by: INTERNAL MEDICINE

## 2020-08-31 NOTE — PROGRESS NOTES
Subjective:        Patient ID: Regino Lawrence is a 74 y.o. female.    Chief Complaint: Follow-up anemia      Diagnosis: Anemia in CKD  Patient is a [employer]ism  .    The patient is seen today via televisit for chronic anemia in CKD. The patient reports that she has been diagnosed with JOLLY in the past.  She has been on oral iron supplementation therapy, but could not tolerate or did not respond, she is uncertain.  She is followed by GI and has undergone a colonoscopy earlier this year and was diagnosed with hemorrhoids for which she underwent banding procedure.  No melena, hematochezia,change in bowel habits.  She has also been diagnosed with B12 deficiency in the past, but reports she did not respond to B12 injections. No history of blood transfusions.  She is a Pent[employer]ostalism.  She reports that she remembers getting injections in the 1970s when her blood count was low.        She is followed by Rheumatology for history of sarcoidosis with associated myopathy and arthropathy.   .She has been treated in the  past with methotrexate and Plaquenil, both of which were ineffective  Cellcept and imuran caused some unknown side effect.   Colchicine  held due to low WBC     No new issues   She is undergoing epo inj q 2wks  Hb 11.8g/dL  Recent EPO inj held    She continues with Chronic diffuse arthralgias-stable   No fevers  No cough/sob  No lightheadedness  No fevers  No melena, hematochezia or change in bowel habits      She was  hospitalized 10/18/2019  She presented with SOB and dyspnea on exertion for the last 2 months.The CTA was negative for PE. She has also seen a cardiologist. Her echo/stress test in 4/2019 were with EF of 55% and no ischemia. She denies any leg swelling associated with this. She was treated for a possible  Sarcoidosis flare-up      She also has  undergone intermittent IV iron therapy    She is followed by pain mgmt for chronic hip pain   She was diagnosed with bursitis of hip and   "underwent steroid inj  history of cervical spinal stenosis s/p posterior C3-C7 laminectomy and fusion on 11/16/15 by Dr. Conner.      CBC   reveals wbc 5060/mm3  Hb 11.8 g/dl Hct 36.9% Plt ct 229k      Patient was in the ED 2018 and was seen on   at [facility] for back issues  She is followed by Neurosurgery for cervical spinal stenosis s/p posterior C3-C7 laminectomy and fusion      She is followed by PCP for DM  DM well -controlled  Hba1c  6.2%       PAST MEDICAL HISTORY:  Acid reflux, alopecia, anemia, anxiety disorder, chronic  kidney disease, depression, diabetes mellitus type 2, hyperlipidemia,  hypertension, hypothyroidism, osteoporosis, sarcoidosis.    PAST SURGICAL HISTORY:  Cholecystectomy, , tubal ligation, carpal tunnel release, cataracts.    FAMILY HISTORY: Unremarkable for cancer. Significant for HTN.       Review of Systems   Constitutional: Negative for activity change, appetite change and fatigue.   HENT: Negative for hearing loss and nosebleeds.    Eyes: Negative for visual disturbance.   Respiratory: Negative for cough and shortness of breath.    Cardiovascular: Negative for chest pain and leg swelling.   Gastrointestinal: Negative for abdominal pain, constipation, diarrhea and nausea.   Genitourinary: Negative for flank pain and urgency.   Musculoskeletal: Positive for arthralgias and back pain. Negative for gait problem and joint swelling.   Skin: Negative for rash.        No petechiae, ecchymoses   Neurological: Negative for light-headedness and headaches.   Hematological: Negative for adenopathy. Does not bruise/bleed easily.       Objective:       Vitals:    20 1040   BP: 136/75   BP Location: Right arm   Patient Position: Sitting   BP Method: Medium (Automatic)   Pulse: 83   Temp: 97 °F (36.1 °C)   TempSrc: Oral   SpO2: 96%   Weight: 55.1 kg (121 lb 7.6 oz)   Height: 5' 1" (1.549 m)     Physical Exam   Constitutional: She is oriented to person, place, and time. She appears " well-developed and well-nourished.   HENT:   Head: Normocephalic.   Mouth/Throat: Oropharynx is clear and moist. No oropharyngeal exudate.   Eyes: Conjunctivae and lids are normal. Pupils are equal, round, and reactive to light. No scleral icterus.   Neck: Normal range of motion. Neck supple. No thyromegaly present.   Cardiovascular: Normal rate, regular rhythm and normal heart sounds.    No murmur heard.  Pulmonary/Chest: Breath sounds normal. She has no wheezes. She has no rales.   Abdominal: Soft. Bowel sounds are normal. She exhibits no distension and no mass. There is no hepatosplenomegaly. There is no tenderness. There is no rebound and no guarding.   Musculoskeletal: Normal range of motion. She exhibits no edema and no tenderness.   Neurological: She is alert and oriented to person, place, and time. No cranial nerve deficit. Coordination normal.   Skin: Skin is warm and dry. No ecchymosis, no petechiae and no rash noted. No erythema.   Psychiatric: She has a normal mood and affect.               Lab Results   Component Value Date    WBC 5.06 08/28/2020    HGB 11.8 (L) 08/28/2020    HCT 36.9 (L) 08/28/2020     (H) 08/28/2020     08/28/2020     Lab Results   Component Value Date    IRON 57 06/26/2020    TIBC 408 06/26/2020    FERRITIN 209 06/26/2020     SPEP-nl      CT renal 3/6/2017   IMPRESSION:  1.  No renal, ureteral or bladder calculi.  No hydronephrosis or ureterectasis.  2.  Poorly distended bladder.  Mild bladder wall prominence.  Mild cystitis cannot be   excluded.  3.  Moderate constipation.  Normal appendix      Lab Results   Component Value Date    IRON 57 06/26/2020    TIBC 408 06/26/2020    FERRITIN 209 06/26/2020     Lab Results   Component Value Date    WBC 5.06 08/28/2020    HGB 11.8 (L) 08/28/2020    HCT 36.9 (L) 08/28/2020     (H) 08/28/2020     08/28/2020         Assessment:       1. Anemia in chronic kidney disease, unspecified CKD stage    2. Chronic kidney  disease, unspecified CKD stage    3. Patient is Restorationist        Plan:   1-3   Pt clinically stable  Pt is a Yarsanism and declines/not interested in blood and blood products due to Jew beliefs  CBC   reveals wbc 5060/mm3  Hb 11.8 g/dl Hct 36.9% Plt ct 229k    Ferritin 209  Fe sats 14    Pt followed by Nephrology . It has been determined early CKD stage III, suspect due to age-related renal nephron loss, along with possible diabetic nephropathy v. hypertensive nephrosclerosis    CBC q2wks STANDING  Cont EPO  inj q 2wks( pending lab parameters)  Last EPO inj held ( Hb 11.8g/dl)  Hb continues to  respond    Continue periodic monitoring of Fe studies    DM well -controlled  Hba1c  6.0%     Follow-up with PCP for med mgmt    F/u 2 mos with Fe studies    Advance Care Planning     Power of   I previously initiated the process of advance care planning today and explained the importance of this process to the patient.  I introduced the concept of advance directives to the patient, as well. Then the patient received detailed information about the importance of designating a Health Care Power of  (HCPOA). She was also instructed to communicate with this person about their wishes for future healthcare, should she become sick and lose decision-making capacity. The patient has  previously appointed a HCPOA. Pt completed in 2015           CC: Micaela Mendoza M.D.

## 2020-09-02 ENCOUNTER — TELEPHONE (OUTPATIENT)
Dept: FAMILY MEDICINE | Facility: CLINIC | Age: 75
End: 2020-09-02

## 2020-09-02 ENCOUNTER — OFFICE VISIT (OUTPATIENT)
Dept: FAMILY MEDICINE | Facility: CLINIC | Age: 75
End: 2020-09-02
Payer: MEDICARE

## 2020-09-02 VITALS
OXYGEN SATURATION: 97 % | TEMPERATURE: 98 F | HEIGHT: 61 IN | DIASTOLIC BLOOD PRESSURE: 62 MMHG | SYSTOLIC BLOOD PRESSURE: 98 MMHG | HEART RATE: 82 BPM | WEIGHT: 122.38 LBS | BODY MASS INDEX: 23.11 KG/M2

## 2020-09-02 DIAGNOSIS — M51.36 DEGENERATIVE DISC DISEASE, LUMBAR: ICD-10-CM

## 2020-09-02 DIAGNOSIS — I10 ESSENTIAL HYPERTENSION: Chronic | ICD-10-CM

## 2020-09-02 DIAGNOSIS — R53.83 FATIGUE, UNSPECIFIED TYPE: ICD-10-CM

## 2020-09-02 DIAGNOSIS — R63.0 POOR APPETITE: ICD-10-CM

## 2020-09-02 DIAGNOSIS — F11.90 CHRONIC, CONTINUOUS USE OF OPIOIDS: ICD-10-CM

## 2020-09-02 DIAGNOSIS — M62.81 MUSCLE WEAKNESS: Primary | ICD-10-CM

## 2020-09-02 DIAGNOSIS — M46.1 SACROILIITIS: ICD-10-CM

## 2020-09-02 DIAGNOSIS — E11.3292 CONTROLLED TYPE 2 DIABETES MELLITUS WITH LEFT EYE AFFECTED BY MILD NONPROLIFERATIVE RETINOPATHY WITHOUT MACULAR EDEMA, WITHOUT LONG-TERM CURRENT USE OF INSULIN: Chronic | ICD-10-CM

## 2020-09-02 PROCEDURE — 3074F SYST BP LT 130 MM HG: CPT | Mod: HCNC,CPTII,S$GLB, | Performed by: FAMILY MEDICINE

## 2020-09-02 PROCEDURE — 3078F PR MOST RECENT DIASTOLIC BLOOD PRESSURE < 80 MM HG: ICD-10-PCS | Mod: HCNC,CPTII,S$GLB, | Performed by: FAMILY MEDICINE

## 2020-09-02 PROCEDURE — 99214 PR OFFICE/OUTPT VISIT, EST, LEVL IV, 30-39 MIN: ICD-10-PCS | Mod: HCNC,S$GLB,, | Performed by: FAMILY MEDICINE

## 2020-09-02 PROCEDURE — 3074F PR MOST RECENT SYSTOLIC BLOOD PRESSURE < 130 MM HG: ICD-10-PCS | Mod: HCNC,CPTII,S$GLB, | Performed by: FAMILY MEDICINE

## 2020-09-02 PROCEDURE — 3008F BODY MASS INDEX DOCD: CPT | Mod: HCNC,CPTII,S$GLB, | Performed by: FAMILY MEDICINE

## 2020-09-02 PROCEDURE — 3044F HG A1C LEVEL LT 7.0%: CPT | Mod: HCNC,CPTII,S$GLB, | Performed by: FAMILY MEDICINE

## 2020-09-02 PROCEDURE — 1125F AMNT PAIN NOTED PAIN PRSNT: CPT | Mod: HCNC,S$GLB,, | Performed by: FAMILY MEDICINE

## 2020-09-02 PROCEDURE — 3044F PR MOST RECENT HEMOGLOBIN A1C LEVEL <7.0%: ICD-10-PCS | Mod: HCNC,CPTII,S$GLB, | Performed by: FAMILY MEDICINE

## 2020-09-02 PROCEDURE — 1159F MED LIST DOCD IN RCRD: CPT | Mod: HCNC,S$GLB,, | Performed by: FAMILY MEDICINE

## 2020-09-02 PROCEDURE — 99999 PR PBB SHADOW E&M-EST. PATIENT-LVL V: ICD-10-PCS | Mod: PBBFAC,HCNC,, | Performed by: FAMILY MEDICINE

## 2020-09-02 PROCEDURE — 1101F PR PT FALLS ASSESS DOC 0-1 FALLS W/OUT INJ PAST YR: ICD-10-PCS | Mod: HCNC,CPTII,S$GLB, | Performed by: FAMILY MEDICINE

## 2020-09-02 PROCEDURE — 99214 OFFICE O/P EST MOD 30 MIN: CPT | Mod: HCNC,S$GLB,, | Performed by: FAMILY MEDICINE

## 2020-09-02 PROCEDURE — 3008F PR BODY MASS INDEX (BMI) DOCUMENTED: ICD-10-PCS | Mod: HCNC,CPTII,S$GLB, | Performed by: FAMILY MEDICINE

## 2020-09-02 PROCEDURE — 1125F PR PAIN SEVERITY QUANTIFIED, PAIN PRESENT: ICD-10-PCS | Mod: HCNC,S$GLB,, | Performed by: FAMILY MEDICINE

## 2020-09-02 PROCEDURE — 1101F PT FALLS ASSESS-DOCD LE1/YR: CPT | Mod: HCNC,CPTII,S$GLB, | Performed by: FAMILY MEDICINE

## 2020-09-02 PROCEDURE — 1159F PR MEDICATION LIST DOCUMENTED IN MEDICAL RECORD: ICD-10-PCS | Mod: HCNC,S$GLB,, | Performed by: FAMILY MEDICINE

## 2020-09-02 PROCEDURE — 3078F DIAST BP <80 MM HG: CPT | Mod: HCNC,CPTII,S$GLB, | Performed by: FAMILY MEDICINE

## 2020-09-02 PROCEDURE — 99999 PR PBB SHADOW E&M-EST. PATIENT-LVL V: CPT | Mod: PBBFAC,HCNC,, | Performed by: FAMILY MEDICINE

## 2020-09-02 RX ORDER — HYDROCODONE BITARTRATE AND ACETAMINOPHEN 5; 325 MG/1; MG/1
1 TABLET ORAL EVERY 6 HOURS PRN
Qty: 90 TABLET | Refills: 0 | Status: SHIPPED | OUTPATIENT
Start: 2020-11-02 | End: 2020-12-02

## 2020-09-02 RX ORDER — HYDROCODONE BITARTRATE AND ACETAMINOPHEN 5; 325 MG/1; MG/1
1 TABLET ORAL EVERY 6 HOURS PRN
Qty: 90 TABLET | Refills: 0 | Status: SHIPPED | OUTPATIENT
Start: 2020-09-02 | End: 2020-10-02

## 2020-09-02 RX ORDER — HYDROCODONE BITARTRATE AND ACETAMINOPHEN 5; 325 MG/1; MG/1
1 TABLET ORAL EVERY 6 HOURS PRN
Qty: 90 TABLET | Refills: 0 | Status: SHIPPED | OUTPATIENT
Start: 2020-10-02 | End: 2020-10-14 | Stop reason: SDUPTHER

## 2020-09-02 NOTE — PROGRESS NOTES
Chief Complaint   Patient presents with    Chronic Pain       HPI  Regino Lawrence is a 74 y.o. female with multiple medical diagnoses as listed in the medical history and problem list that presents for follow-up for chronic pain in her lower back, hip, neck and joints. She is also concerned today about weakness    Weakness  She has had concerns about feeling weakness in her muscles the past few days. She has trouble walking w/o her scooter b/c of this and is concerned that her potassium may be low  She has also not been eating more than one meal a day often. This AM she has only eaten half of a biscuit. She does drink water but her daughter often reminds her about eating    Chronic lower back pain  Has mainly been in her hip and lower back, had a recent RHIANNA that was not very helpful so now pain mgmt will try the SI joint  She is taking her pain medication, often w/o food    HPI    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, without long-term current use of insulin    Sarcoidosis    Corneal scar, right eye    Nuclear sclerosis    Senile nuclear sclerosis    Immunosuppression    Left shoulder pain    Cervical spinal stenosis    Patient is Presybeterian    GERD (gastroesophageal reflux disease)    Debility    S/P cervical spinal fusion    Chronic bilateral low back pain with left-sided sciatica    Degenerative disc disease, lumbar    Poor motor control of trunk    Impaired mobility    Muscle weakness    Iron deficiency anemia    Anemia in CKD (chronic kidney disease)    Refusal of blood transfusions as patient is Jehovah's  "Witness    Current use of steroid medication    DM type 2 without retinopathy    Pseudophakia    Insufficiency of tear film of both eyes    Refractive error    Myopathy    Osteopenia    Calcification of aorta    Dry eye syndrome, bilateral    Neck pain    Lumbar disc herniation with radiculopathy    Antalgic gait    Lumbar radiculopathy    Lumbar stenosis with neurogenic claudication    Anemia in chronic kidney disease    Greater trochanteric bursitis of left hip    Left hip pain    FAUSTIN (dyspnea on exertion)    Chest pain, atypical    Hemispheric retinal vein occlusion with macular edema of left eye    Degenerative joint disease (DJD) of hip    Chronic pain    Left sided numbness    Sacroiliitis    Chronic, continuous use of opioids    Osteoarthritis of hip         ROS  Review of Systems   Constitutional: Positive for fatigue. Negative for chills, diaphoresis, fever and unexpected weight change.   HENT: Negative for rhinorrhea, sinus pressure, sore throat and tinnitus.    Eyes: Negative for photophobia and visual disturbance.   Respiratory: Negative for cough, shortness of breath and wheezing.    Cardiovascular: Negative for chest pain and palpitations.   Gastrointestinal: Negative for abdominal pain, blood in stool, constipation, diarrhea, nausea and vomiting.   Genitourinary: Negative for dysuria, flank pain, frequency and vaginal discharge.   Musculoskeletal: Positive for arthralgias and back pain. Negative for joint swelling.   Skin: Negative for rash.   Neurological: Positive for weakness. Negative for speech difficulty, light-headedness and headaches.   Psychiatric/Behavioral: Negative for behavioral problems and dysphoric mood.       Physical Exam  Vitals:    09/02/20 1432   BP: 98/62   BP Location: Left arm   Patient Position: Sitting   BP Method: Medium (Manual)   Pulse: 82   Temp: 98.2 °F (36.8 °C)   TempSrc: Oral   SpO2: 97%   Weight: 55.5 kg (122 lb 5.7 oz)   Height: 5' 1" " "(1.549 m)    Body mass index is 23.12 kg/m².  Weight: 55.5 kg (122 lb 5.7 oz)   Height: 5' 1" (154.9 cm)     Physical Exam  Vitals signs and nursing note reviewed.   Constitutional:       General: She is not in acute distress.     Appearance: She is well-developed.      Comments: Appears frail, in scooter, speech slightly slurred   Neck:      Musculoskeletal: Neck supple.   Cardiovascular:      Rate and Rhythm: Normal rate and regular rhythm.      Heart sounds: No murmur. No friction rub. No gallop.    Pulmonary:      Effort: Pulmonary effort is normal. No respiratory distress.      Breath sounds: Normal breath sounds. No wheezing or rales.   Lymphadenopathy:      Cervical: No cervical adenopathy.   Skin:     General: Skin is warm and dry.      Findings: No rash.   Neurological:      Mental Status: She is alert.   Psychiatric:         Behavior: Behavior normal.         ALLERGIES AND MEDICATIONS: updated and reviewed.  Review of patient's allergies indicates:   Allergen Reactions    Azathioprine Shortness Of Breath and Other (See Comments)     Fatigue     Medication List with Changes/Refills   New Medications    HYDROCODONE-ACETAMINOPHEN (NORCO) 5-325 MG PER TABLET    Take 1 tablet by mouth every 6 (six) hours as needed.    HYDROCODONE-ACETAMINOPHEN (NORCO) 5-325 MG PER TABLET    Take 1 tablet by mouth every 6 (six) hours as needed.   Current Medications    ACCU-CHEK FASTCLIX LANCING DEV KIT    USE AS DIRECTED.    ALCOHOL ANTISEPTIC PADS (ALCOHOL PREP PADS TOP)        ATORVASTATIN (LIPITOR) 20 MG TABLET    Take 1 tablet (20 mg total) by mouth once daily.    BLOOD SUGAR DIAGNOSTIC (ACCU-CHEK SMARTVIEW TEST STRIP) STRP    Use as directed to check blood sugar twice daily.    BLOOD-GLUCOSE METER KIT    Use as instructed    CALCIUM CARBONATE (OS-MALCOLM) 600 MG CALCIUM (1,500 MG) TAB    Take 1 tablet by mouth 2 (two) times daily.     CARVEDILOL (COREG) 25 MG TABLET    Take 1 tablet (25 mg total) by mouth 2 (two) times daily. "    CLONIDINE (CATAPRES) 0.3 MG TABLET    Take 1 tablet (0.3 mg total) by mouth 3 (three) times daily.    DIAPER,BRIEF,ADULT,DISPOSABLE MISC        EPOETIN WOLFGANG-EPBX (RETACRIT) 10,000 UNIT/ML IMJECTION    Inject 20,000 Units into the skin every 14 (fourteen) days.    FENOFIBRATE 160 MG TAB    Take 1 tablet by mouth once daily    FLUZONE HIGH-DOSE 2019-20, PF, 180 MCG/0.5 ML SYRG    PHARMACIST ADMINISTERED IMMUNIZATION ADMINISTERED AT TIME OF DISPENSING    FOLIC ACID (FOLVITE) 1 MG TABLET    Take 1 tablet (1,000 mcg total) by mouth once daily.    GABAPENTIN (NEURONTIN) 400 MG CAPSULE    TAKE 1 CAPSULE BY MOUTH THREE TIMES DAILY    LEVOTHYROXINE (SYNTHROID) 50 MCG TABLET    TAKE 1 TABLET BY MOUTH ONCE DAILY BEFORE BREAKFAST    METFORMIN (GLUCOPHAGE) 1000 MG TABLET    Take 1 tablet (1,000 mg total) by mouth 2 (two) times daily with meals.    NIFEDIPINE (PROCARDIA-XL) 30 MG (OSM) 24 HR TABLET    Take 1 tablet by mouth once daily    OXYBUTYNIN (DITROPAN-XL) 5 MG TR24    Take 1 tablet (5 mg total) by mouth once daily.    PREDNISONE (DELTASONE) 5 MG TABLET    Take 1.5 tablets (7.5 mg total) by mouth once daily.    TIZANIDINE (ZANAFLEX) 2 MG TABLET    TAKE 2 TABLETS BY MOUTH EVERY 8 HOURS AS NEEDED   Changed and/or Refilled Medications    Modified Medication Previous Medication    HYDROCODONE-ACETAMINOPHEN (NORCO) 5-325 MG PER TABLET HYDROcodone-acetaminophen (NORCO) 5-325 mg per tablet       Take 1 tablet by mouth every 6 (six) hours as needed.    Take 1 tablet by mouth every 6 (six) hours as needed.       Assessment & Plan  1. Muscle weakness    2. Essential hypertension    3. 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 Chronic, continuous use of opioids    5. Sacroiliitis    6. Degenerative disc disease, lumbar    7. Fatigue, unspecified type    8. 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, without long-term current use of insulin (Chronic)  -controlled, has not had hypoglycemic episodes  Lab Results   Component Value Date    HGBA1C 6.0 (H) 05/22/2020            Cardiac/Vascular    Essential hypertension (Chronic)  -low today, likely due to poor oral intake       Orthopedic    Muscle weakness - Primary  -will check K level, encouraged to improve PO intake and maintain hydration    Relevant Orders    Basic metabolic panel    Sacroiliitis  -pending injection for this, has limited mobility which worsens inflammation       Other    Fatigue  -likely due to poor oral intake along with BP medications      Other Visit Diagnoses     Poor appetite      -we discussed that this is likely the cause of her weakness  She has been advised to begin using shakes, may use premier, boost, or glucerna at least twice daily            Follow up in about 4 weeks (around 9/30/2020) for Follow up.    Other Orders Placed This Visit:  Orders Placed This Encounter   Procedures    Basic metabolic panel

## 2020-09-03 ENCOUNTER — HOSPITAL ENCOUNTER (OUTPATIENT)
Facility: OTHER | Age: 75
Discharge: HOME | End: 2020-09-03
Attending: ANESTHESIOLOGY | Admitting: ANESTHESIOLOGY
Payer: MEDICARE

## 2020-09-03 VITALS
DIASTOLIC BLOOD PRESSURE: 68 MMHG | HEIGHT: 61 IN | WEIGHT: 122 LBS | HEART RATE: 82 BPM | OXYGEN SATURATION: 97 % | SYSTOLIC BLOOD PRESSURE: 139 MMHG | RESPIRATION RATE: 14 BRPM | TEMPERATURE: 98 F | BODY MASS INDEX: 23.03 KG/M2

## 2020-09-03 DIAGNOSIS — M46.1 SACROILIITIS: Primary | ICD-10-CM

## 2020-09-03 DIAGNOSIS — G89.29 CHRONIC PAIN: ICD-10-CM

## 2020-09-03 LAB — POCT GLUCOSE: 202 MG/DL (ref 70–110)

## 2020-09-03 PROCEDURE — 27096 INJECT SACROILIAC JOINT: CPT | Mod: HCNC,LT | Performed by: ANESTHESIOLOGY

## 2020-09-03 PROCEDURE — 63600175 PHARM REV CODE 636 W HCPCS: Mod: HCNC | Performed by: ANESTHESIOLOGY

## 2020-09-03 PROCEDURE — 27096 PR INJECTION,SACROILIAC JOINT: ICD-10-PCS | Mod: HCNC,LT,, | Performed by: ANESTHESIOLOGY

## 2020-09-03 PROCEDURE — 27096 INJECT SACROILIAC JOINT: CPT | Mod: HCNC,LT,, | Performed by: ANESTHESIOLOGY

## 2020-09-03 PROCEDURE — 82947 ASSAY GLUCOSE BLOOD QUANT: CPT | Mod: HCNC | Performed by: ANESTHESIOLOGY

## 2020-09-03 PROCEDURE — 25000003 PHARM REV CODE 250: Mod: HCNC | Performed by: ANESTHESIOLOGY

## 2020-09-03 PROCEDURE — 25500020 PHARM REV CODE 255: Mod: HCNC | Performed by: ANESTHESIOLOGY

## 2020-09-03 RX ORDER — TRIAMCINOLONE ACETONIDE 40 MG/ML
INJECTION, SUSPENSION INTRA-ARTICULAR; INTRAMUSCULAR
Status: DISCONTINUED | OUTPATIENT
Start: 2020-09-03 | End: 2020-09-03 | Stop reason: HOSPADM

## 2020-09-03 RX ORDER — ALPRAZOLAM 0.5 MG/1
0.5 TABLET ORAL ONCE
Status: COMPLETED | OUTPATIENT
Start: 2020-09-03 | End: 2020-09-03

## 2020-09-03 RX ORDER — SODIUM CHLORIDE 9 MG/ML
500 INJECTION, SOLUTION INTRAVENOUS CONTINUOUS
Status: DISCONTINUED | OUTPATIENT
Start: 2020-09-03 | End: 2020-09-03 | Stop reason: HOSPADM

## 2020-09-03 RX ORDER — BUPIVACAINE HYDROCHLORIDE 2.5 MG/ML
INJECTION, SOLUTION EPIDURAL; INFILTRATION; INTRACAUDAL
Status: DISCONTINUED | OUTPATIENT
Start: 2020-09-03 | End: 2020-09-03 | Stop reason: HOSPADM

## 2020-09-03 RX ORDER — LIDOCAINE HYDROCHLORIDE 10 MG/ML
INJECTION INFILTRATION; PERINEURAL
Status: DISCONTINUED | OUTPATIENT
Start: 2020-09-03 | End: 2020-09-03 | Stop reason: HOSPADM

## 2020-09-03 RX ADMIN — ALPRAZOLAM 0.5 MG: 0.5 TABLET ORAL at 10:09

## 2020-09-03 NOTE — DISCHARGE INSTRUCTIONS

## 2020-09-03 NOTE — DISCHARGE SUMMARY
Discharge Note  Short Stay      SUMMARY     Admit Date: 9/3/2020    Attending Physician: Alfonso Richards      Discharge Physician: Alfonso Richards      Discharge Date: 9/3/2020 11:47 AM    Procedure(s) (LRB):  INJECTION, JOINT, LEFT SI (Left)    Final Diagnosis: Sacroiliitis [M46.1]    Disposition: Home or self care    Patient Instructions:   Current Discharge Medication List      CONTINUE these medications which have NOT CHANGED    Details   ACCU-CHEK FASTCLIX LANCING DEV Kit USE AS DIRECTED.  Qty: 1 each, Refills: 0    Associated Diagnoses: Controlled diabetes mellitus type 2 with complications      ALCOHOL ANTISEPTIC PADS (ALCOHOL PREP PADS TOP)       atorvastatin (LIPITOR) 20 MG tablet Take 1 tablet (20 mg total) by mouth once daily.  Qty: 90 tablet, Refills: 3    Associated Diagnoses: Combined 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      blood sugar diagnostic (ACCU-CHEK SMARTVIEW TEST STRIP) Strp Use as directed to check blood sugar twice daily.  Qty: 200 strip, Refills: 3    Associated Diagnoses: Controlled type 2 diabetes mellitus with complication, without long-term current use of insulin      blood-glucose meter kit Use as instructed  Qty: 1 each, Refills: 0    Comments: AccuCheck  Associated Diagnoses: Controlled type 2 diabetes mellitus without complication, without long-term current use of insulin      calcium carbonate (OS-MALCOLM) 600 mg calcium (1,500 mg) Tab Take 1 tablet by mouth 2 (two) times daily.       carvediloL (COREG) 25 MG tablet Take 1 tablet (25 mg total) by mouth 2 (two) times daily.  Qty: 180 tablet, Refills: 0    Comments: .  Associated Diagnoses: Essential hypertension      cloNIDine (CATAPRES) 0.3 MG tablet Take 1 tablet (0.3 mg total) by mouth 3 (three) times daily.  Qty: 270 tablet, Refills: 1    Comments: .  Associated Diagnoses: Essential hypertension      diaper,brief,adult,disposable Misc       epoetin german-epbx (RETACRIT) 10,000 unit/mL imjection Inject 20,000 Units into  the skin every 14 (fourteen) days.      fenofibrate 160 MG Tab Take 1 tablet by mouth once daily  Qty: 90 tablet, Refills: 3    Associated Diagnoses: Combined 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      FLUZONE HIGH-DOSE 2019-20, PF, 180 mcg/0.5 mL Syrg PHARMACIST ADMINISTERED IMMUNIZATION ADMINISTERED AT TIME OF DISPENSING  Refills: 0      folic acid (FOLVITE) 1 MG tablet Take 1 tablet (1,000 mcg total) by mouth once daily.  Qty: 90 tablet, Refills: 0    Associated Diagnoses: Sarcoidosis; Myopathy      gabapentin (NEURONTIN) 400 MG capsule TAKE 1 CAPSULE BY MOUTH THREE TIMES DAILY  Qty: 90 capsule, Refills: 1    Associated Diagnoses: Chronic bilateral low back pain with left-sided sciatica      !! HYDROcodone-acetaminophen (NORCO) 5-325 mg per tablet Take 1 tablet by mouth every 6 (six) hours as needed.  Qty: 90 tablet, Refills: 0    Comments: Medically necessary for more than 7 days  Associated Diagnoses: Degenerative disc disease, lumbar      !! HYDROcodone-acetaminophen (NORCO) 5-325 mg per tablet Take 1 tablet by mouth every 6 (six) hours as needed.  Qty: 90 tablet, Refills: 0    Comments: Medically necessary for more than 7 days  Associated Diagnoses: Degenerative disc disease, lumbar      !! HYDROcodone-acetaminophen (NORCO) 5-325 mg per tablet Take 1 tablet by mouth every 6 (six) hours as needed.  Qty: 90 tablet, Refills: 0    Comments: Medically necessary for more than 7 days  Associated Diagnoses: Degenerative disc disease, lumbar      levothyroxine (SYNTHROID) 50 MCG tablet TAKE 1 TABLET BY MOUTH ONCE DAILY BEFORE BREAKFAST  Qty: 90 tablet, Refills: 1    Associated Diagnoses: Hypothyroidism, unspecified type      metFORMIN (GLUCOPHAGE) 1000 MG tablet Take 1 tablet (1,000 mg total) by mouth 2 (two) times daily with meals.  Qty: 180 tablet, Refills: 1    Associated Diagnoses: Diabetes mellitus, type 2; Diabetes mellitus with stage 3 chronic kidney disease      NIFEdipine (PROCARDIA-XL) 30  MG (OSM) 24 hr tablet Take 1 tablet by mouth once daily  Qty: 90 tablet, Refills: 1    Associated Diagnoses: Essential hypertension      oxybutynin (DITROPAN-XL) 5 MG TR24 Take 1 tablet (5 mg total) by mouth once daily.  Qty: 30 tablet, Refills: 11      predniSONE (DELTASONE) 5 MG tablet Take 1.5 tablets (7.5 mg total) by mouth once daily.  Qty: 135 tablet, Refills: 1    Associated Diagnoses: Sarcoidosis; Myopathy      tiZANidine (ZANAFLEX) 2 MG tablet TAKE 2 TABLETS BY MOUTH EVERY 8 HOURS AS NEEDED  Qty: 270 tablet, Refills: 0    Associated Diagnoses: Myalgia       !! - Potential duplicate medications found. Please discuss with provider.              Discharge Diagnosis: Sacroiliitis [M46.1]  Condition on Discharge: Stable with no complications to procedure   Diet on Discharge: Same as before.  Activity: as per instruction sheet.  Discharge to: Home with a responsible adult.  Follow up: 2-4 weeks       Please call my office or pager at [phone] if experienced any weakness or loss of sensation, fever > 101.5, pain uncontrolled with oral medications, persistent nausea/vomiting/or diarrhea, redness or drainage from the incisions, or any other worrisome concerns. If physician on call was not reached or could not communicate with our office for any reason please go to the nearest emergency department

## 2020-09-03 NOTE — OP NOTE
Sacroiliac Joint Injection under Fluoroscopy  Time-out taken to identify patient and procedure side prior to starting the procedure.   I attest that I have reviewed the patient's home medications prior to the procedure and no contraindication have been identified. I  re-evaluated the patient after the patient was positioned for the procedure in the procedure room immediately before the procedural time-out. The vital signs are current and represent the current state of the patient which has not significantly changed since the preprocedure assessment.               Date of Service: 09/03/2020    PCP: Micaela Mnedoza MD    Referring Physician:                                                   PROCEDURE:  leftsacroiliac joint injection under fluoroscopy.    REASON FOR PROCEDURE: left sacroiliac joint Sacroiliitis [M46.1]  1. Sacroiliitis    2. Chronic pain      POSTPROCEDURE DIAGNOSIS:   Sacroiliitis [M46.1]    1. Sacroiliitis    2. Chronic pain           PHYSICIAN: Alfonso Richards MD  ASSISTANTS: Vivian Tucker DO ([facility])          MEDICATIONS INJECTED:  Kenalog 20mg and Bupivacaine 0.25% (1.5ml of bupivicaine per side).    LOCAL ANESTHETIC USED: Xylocaine 1% 9ml with Sodium Bicarbonate 1ml. 3ml per site.  SEDATION MEDICATIONS: None    ESTIMATED BLOOD LOSS:  None.    COMPLICATIONS:  None.    TECHNIQUE:   Laying in the prone position, the patient was prepped and draped in the usual sterile fashion using ChloraPrep and fenestrated drape.  The area was determined under fluoroscopy.  Local Xylocaine was injected by raising a wheel and going down to the periosteum using a 27-gauge hypodermic needle.  The 3.5 inch 22-gauge spinal needle was introduce into the left sacroiliac joint.  Negative pressure applied to confirm no intravascular placement.  Omnipaque was injected to confirm placement and to confirm that there was no vascular runoff.  The medication was then injected slowly.  The patient tolerated the procedure  well.    PAIN BEFORE THE PROCEDURE:  10/10.    PAIN AFTER THE PROCEDURE: 0  /10.    The patient was monitored for approximately 30 minutes after the procedure.  Patient was given post procedure and discharge instructions to follow at home.  We will see the patient back in two weeks or the patient may call to inform of status. The patient was discharged in a stable condition

## 2020-09-04 ENCOUNTER — LAB VISIT (OUTPATIENT)
Dept: LAB | Facility: HOSPITAL | Age: 75
End: 2020-09-04
Attending: FAMILY MEDICINE
Payer: MEDICARE

## 2020-09-04 DIAGNOSIS — M62.81 MUSCLE WEAKNESS: ICD-10-CM

## 2020-09-04 LAB
ANION GAP SERPL CALC-SCNC: 6 MMOL/L (ref 8–16)
BUN SERPL-MCNC: 17 MG/DL (ref 8–23)
CALCIUM SERPL-MCNC: 9.4 MG/DL (ref 8.7–10.5)
CHLORIDE SERPL-SCNC: 104 MMOL/L (ref 95–110)
CO2 SERPL-SCNC: 29 MMOL/L (ref 23–29)
CREAT SERPL-MCNC: 1 MG/DL (ref 0.5–1.4)
EST. GFR  (AFRICAN AMERICAN): >60 ML/MIN/1.73 M^2
EST. GFR  (NON AFRICAN AMERICAN): 55.6 ML/MIN/1.73 M^2
GLUCOSE SERPL-MCNC: 96 MG/DL (ref 70–110)
POTASSIUM SERPL-SCNC: 3.9 MMOL/L (ref 3.5–5.1)
SODIUM SERPL-SCNC: 139 MMOL/L (ref 136–145)

## 2020-09-04 PROCEDURE — 80048 BASIC METABOLIC PNL TOTAL CA: CPT | Mod: HCNC

## 2020-09-04 PROCEDURE — 36415 COLL VENOUS BLD VENIPUNCTURE: CPT | Mod: HCNC,PO

## 2020-09-11 ENCOUNTER — LAB VISIT (OUTPATIENT)
Dept: LAB | Facility: HOSPITAL | Age: 75
End: 2020-09-11
Attending: INTERNAL MEDICINE
Payer: MEDICARE

## 2020-09-11 ENCOUNTER — DOCUMENTATION ONLY (OUTPATIENT)
Dept: INFUSION THERAPY | Facility: HOSPITAL | Age: 75
End: 2020-09-11

## 2020-09-11 DIAGNOSIS — D64.9 ANEMIA: ICD-10-CM

## 2020-09-11 DIAGNOSIS — M51.36 DEGENERATIVE DISC DISEASE, LUMBAR: ICD-10-CM

## 2020-09-11 DIAGNOSIS — I10 ESSENTIAL HYPERTENSION: Chronic | ICD-10-CM

## 2020-09-11 LAB
ERYTHROCYTE [DISTWIDTH] IN BLOOD BY AUTOMATED COUNT: 13.2 % (ref 11.5–14.5)
HCT VFR BLD AUTO: 35.6 % (ref 37–48.5)
HGB BLD-MCNC: 11.3 G/DL (ref 12–16)
IMM GRANULOCYTES # BLD AUTO: 0.06 K/UL (ref 0–0.04)
MCH RBC QN AUTO: 31.7 PG (ref 27–31)
MCHC RBC AUTO-ENTMCNC: 31.7 G/DL (ref 32–36)
MCV RBC AUTO: 100 FL (ref 82–98)
NEUTROPHILS # BLD AUTO: 5 K/UL (ref 1.8–7.7)
PLATELET # BLD AUTO: 243 K/UL (ref 150–350)
PMV BLD AUTO: 9.1 FL (ref 9.2–12.9)
RBC # BLD AUTO: 3.57 M/UL (ref 4–5.4)
WBC # BLD AUTO: 7.34 K/UL (ref 3.9–12.7)

## 2020-09-11 PROCEDURE — 85027 COMPLETE CBC AUTOMATED: CPT | Mod: HCNC

## 2020-09-11 PROCEDURE — 36415 COLL VENOUS BLD VENIPUNCTURE: CPT | Mod: HCNC

## 2020-09-11 RX ORDER — CLONIDINE HYDROCHLORIDE 0.3 MG/1
0.3 TABLET ORAL 3 TIMES DAILY
Qty: 270 TABLET | Refills: 1 | Status: CANCELLED | OUTPATIENT
Start: 2020-09-11

## 2020-09-11 RX ORDER — HYDROCODONE BITARTRATE AND ACETAMINOPHEN 5; 325 MG/1; MG/1
1 TABLET ORAL EVERY 6 HOURS PRN
Qty: 90 TABLET | Refills: 0 | Status: CANCELLED | OUTPATIENT
Start: 2020-09-11 | End: 2020-10-11

## 2020-09-11 NOTE — PROGRESS NOTES
Hgb 11.3 today (9/11) Levels outside of RETAcrit guidelines. Will hold injection today and repeat cbc with retacrit pending results in 2 weeks. Patient verbalized understanding and discharged from unit via mobile scooter. In NAD at time of dc.

## 2020-09-14 ENCOUNTER — TELEPHONE (OUTPATIENT)
Dept: FAMILY MEDICINE | Facility: CLINIC | Age: 75
End: 2020-09-14

## 2020-09-14 ENCOUNTER — HOSPITAL ENCOUNTER (OUTPATIENT)
Dept: RADIOLOGY | Facility: HOSPITAL | Age: 75
Discharge: HOME | End: 2020-09-14
Attending: NURSE PRACTITIONER
Payer: MEDICARE

## 2020-09-14 DIAGNOSIS — Z12.31 ENCOUNTER FOR SCREENING MAMMOGRAM FOR MALIGNANT NEOPLASM OF BREAST: ICD-10-CM

## 2020-09-14 DIAGNOSIS — Z12.39 SCREENING FOR BREAST CANCER: ICD-10-CM

## 2020-09-14 PROCEDURE — 77067 MAMMO DIGITAL SCREENING BILAT WITH TOMOSYNTHESIS_CAD: ICD-10-PCS | Mod: 26,HCNC,, | Performed by: RADIOLOGY

## 2020-09-14 PROCEDURE — 77067 SCR MAMMO BI INCL CAD: CPT | Mod: 26,HCNC,, | Performed by: RADIOLOGY

## 2020-09-14 PROCEDURE — 77067 SCR MAMMO BI INCL CAD: CPT | Mod: TC,HCNC

## 2020-09-14 PROCEDURE — 77063 MAMMO DIGITAL SCREENING BILAT WITH TOMOSYNTHESIS_CAD: ICD-10-PCS | Mod: 26,HCNC,, | Performed by: RADIOLOGY

## 2020-09-14 PROCEDURE — 77063 BREAST TOMOSYNTHESIS BI: CPT | Mod: 26,HCNC,, | Performed by: RADIOLOGY

## 2020-09-14 RX ORDER — CARVEDILOL 25 MG/1
25 TABLET ORAL 2 TIMES DAILY
Qty: 180 TABLET | Refills: 1 | Status: SHIPPED | OUTPATIENT
Start: 2020-09-14 | End: 2021-03-24 | Stop reason: SDUPTHER

## 2020-09-16 ENCOUNTER — TELEPHONE (OUTPATIENT)
Dept: PAIN MEDICINE | Facility: CLINIC | Age: 75
End: 2020-09-16

## 2020-09-16 DIAGNOSIS — D86.9 SARCOIDOSIS: Chronic | ICD-10-CM

## 2020-09-16 DIAGNOSIS — G72.9 MYOPATHY: ICD-10-CM

## 2020-09-16 NOTE — TELEPHONE ENCOUNTER
Staff contacted and spoke with patient informing her to report to the 8th floor [address] on tomorrow.    Patient verbalized understanding.

## 2020-09-17 ENCOUNTER — OFFICE VISIT (OUTPATIENT)
Dept: PAIN MEDICINE | Facility: CLINIC | Age: 75
End: 2020-09-17
Payer: MEDICARE

## 2020-09-17 VITALS
WEIGHT: 122 LBS | BODY MASS INDEX: 23.03 KG/M2 | TEMPERATURE: 98 F | RESPIRATION RATE: 18 BRPM | HEIGHT: 61 IN | SYSTOLIC BLOOD PRESSURE: 136 MMHG | DIASTOLIC BLOOD PRESSURE: 75 MMHG | HEART RATE: 94 BPM

## 2020-09-17 DIAGNOSIS — M16.12 PRIMARY OSTEOARTHRITIS OF LEFT HIP: ICD-10-CM

## 2020-09-17 DIAGNOSIS — M53.3 SACROILIAC JOINT PAIN: Primary | ICD-10-CM

## 2020-09-17 DIAGNOSIS — G89.4 CHRONIC PAIN DISORDER: ICD-10-CM

## 2020-09-17 DIAGNOSIS — E03.9 HYPOTHYROIDISM, UNSPECIFIED TYPE: ICD-10-CM

## 2020-09-17 DIAGNOSIS — M70.62 GREATER TROCHANTERIC BURSITIS OF LEFT HIP: ICD-10-CM

## 2020-09-17 DIAGNOSIS — M47.816 LUMBAR SPONDYLOSIS: ICD-10-CM

## 2020-09-17 DIAGNOSIS — M51.36 DDD (DEGENERATIVE DISC DISEASE), LUMBAR: ICD-10-CM

## 2020-09-17 PROCEDURE — 1101F PT FALLS ASSESS-DOCD LE1/YR: CPT | Mod: HCNC,CPTII,S$GLB, | Performed by: NURSE PRACTITIONER

## 2020-09-17 PROCEDURE — 3078F PR MOST RECENT DIASTOLIC BLOOD PRESSURE < 80 MM HG: ICD-10-PCS | Mod: HCNC,CPTII,S$GLB, | Performed by: NURSE PRACTITIONER

## 2020-09-17 PROCEDURE — 99213 OFFICE O/P EST LOW 20 MIN: CPT | Mod: HCNC,S$GLB,, | Performed by: NURSE PRACTITIONER

## 2020-09-17 PROCEDURE — 3075F SYST BP GE 130 - 139MM HG: CPT | Mod: HCNC,CPTII,S$GLB, | Performed by: NURSE PRACTITIONER

## 2020-09-17 PROCEDURE — 3008F BODY MASS INDEX DOCD: CPT | Mod: HCNC,CPTII,S$GLB, | Performed by: NURSE PRACTITIONER

## 2020-09-17 PROCEDURE — 1159F PR MEDICATION LIST DOCUMENTED IN MEDICAL RECORD: ICD-10-PCS | Mod: HCNC,S$GLB,, | Performed by: NURSE PRACTITIONER

## 2020-09-17 PROCEDURE — 99999 PR PBB SHADOW E&M-EST. PATIENT-LVL V: CPT | Mod: PBBFAC,HCNC,, | Performed by: NURSE PRACTITIONER

## 2020-09-17 PROCEDURE — 1125F AMNT PAIN NOTED PAIN PRSNT: CPT | Mod: HCNC,S$GLB,, | Performed by: NURSE PRACTITIONER

## 2020-09-17 PROCEDURE — 3075F PR MOST RECENT SYSTOLIC BLOOD PRESS GE 130-139MM HG: ICD-10-PCS | Mod: HCNC,CPTII,S$GLB, | Performed by: NURSE PRACTITIONER

## 2020-09-17 PROCEDURE — 99999 PR PBB SHADOW E&M-EST. PATIENT-LVL V: ICD-10-PCS | Mod: PBBFAC,HCNC,, | Performed by: NURSE PRACTITIONER

## 2020-09-17 PROCEDURE — 1101F PR PT FALLS ASSESS DOC 0-1 FALLS W/OUT INJ PAST YR: ICD-10-PCS | Mod: HCNC,CPTII,S$GLB, | Performed by: NURSE PRACTITIONER

## 2020-09-17 PROCEDURE — 1159F MED LIST DOCD IN RCRD: CPT | Mod: HCNC,S$GLB,, | Performed by: NURSE PRACTITIONER

## 2020-09-17 PROCEDURE — 3008F PR BODY MASS INDEX (BMI) DOCUMENTED: ICD-10-PCS | Mod: HCNC,CPTII,S$GLB, | Performed by: NURSE PRACTITIONER

## 2020-09-17 PROCEDURE — 3078F DIAST BP <80 MM HG: CPT | Mod: HCNC,CPTII,S$GLB, | Performed by: NURSE PRACTITIONER

## 2020-09-17 PROCEDURE — 99213 PR OFFICE/OUTPT VISIT, EST, LEVL III, 20-29 MIN: ICD-10-PCS | Mod: HCNC,S$GLB,, | Performed by: NURSE PRACTITIONER

## 2020-09-17 PROCEDURE — 1125F PR PAIN SEVERITY QUANTIFIED, PAIN PRESENT: ICD-10-PCS | Mod: HCNC,S$GLB,, | Performed by: NURSE PRACTITIONER

## 2020-09-17 RX ORDER — FOLIC ACID 1 MG/1
1000 TABLET ORAL DAILY
Qty: 90 TABLET | Refills: 0 | Status: SHIPPED | OUTPATIENT
Start: 2020-09-17 | End: 2020-09-21 | Stop reason: SDUPTHER

## 2020-09-17 NOTE — PROGRESS NOTES
Chronic patient Established Note (Follow up visit)      SUBJECTIVE:    Interval History 9/17/2020:  The patient returns to clinic today for follow up of low back pain. She is s/p left SI joint injection on 9/3/2020. She reports 30-40% relief of her left hip and buttock pain. She has been able to sit for longer periods of time without increased pain. She denies any radiating leg pain today. She continues to take Gabapentin, Zanaflex, and Norco per her PCP. She denies any other health changes. Her pain today is 7/10.    Interval History 8/11/2020:  The patient returns to clinic today for follow up of low back pain. She is s/p left hip and GTB injection on 7/27/2020. She reports limited relief of her pain. She continues to report left sided hip and buttock pain. She reports intermittent radiating pain down the lateral aspect of her left leg to her ankle. She describes this pain as sharp in nature. She denies any radiating leg pain. She continues to take Gabapentin, Zanaflex, and Norco per PCP. She is performing some home stretches. She denies any other health changes. Her pain today is 10/10.    Interval History 7/14/2020:  Regino Lawrence presents to the clinic for a follow-up appointment for low back and hip pain. She is s/p bilateral L5/S1 TF RHIANNA on 6/29/2020. She reports 100% relief of her low back pain. She reports increased left hip pain. She describes this pain as constant, sharp, and stabbing in nature. Her pain is worse with sitting, walking, and laying on her left side. She denies any radicular leg pain. She continues to take Gabapentin, Zanaflex, and Norco per her PCP. She denies any other health changes. Her pain today is 10/10.     Pain Disability Index Review:  Last 3 PDI Scores 9/17/2020 8/11/2020 7/14/2020   Pain Disability Index (PDI) 41 66 61       Pain Medications:  Gabapentin  Zanaflex  Norco    Opioid Contract: yes- with PCP     report:  Reviewed and consistent with medication use as  prescribed.    Pain Procedures:   10/8/2018- Left L5 TF RHIANNA  12/6/2018- Bilateral L5 TF RHIANNA  3/21/2019- Left hip and GTB injection  7/22/2019- Left hip joint injection  9/12/2019- Left hip and GTB injection   12/15/2019- Bilateral L5 TF RHIANNA  6/29/2020- Bilateral L5/S1 TF RHIANNA  7/27/2020- Left hip and GTB injections  9/3/2020- Left SI joint injection     Physical Therapy/Home Exercise: no    Imaging:   MRI Lumbar Spine 9/12/2018:  COMPARISON:  None.     FINDINGS:  The distal cord/conus demonstrates normal size and signal.  No evidence of osteomyelitis, marrow replacement process, or acute fracture.  No paraspinal masses.     At L1-2, there is asymmetric disc bulging to the right, resulting in mild right-sided neural foraminal narrowing.  No spinal canal stenosis.     L1-2 is unremarkable.     L2-3 demonstrates mild disc bulging slightly asymmetric to the left resulting in mild left-sided neural foraminal narrowing.  No spinal canal stenosis.     L4-5 demonstrates minimal anterolisthesis anterolisthesis.  There is mild facet arthropathy and disc bulging.  This results in mild left-sided neural foraminal narrowing.  No spinal canal stenosis.     At L5-S1, there is a moderate sized left lateral recess/foraminal disc extrusion effacing the left neural foramen, likely impinging upon the exiting left L5 nerve root.  There is left-sided degenerative disc disease, noting disc height loss and sub endplate marrow edema.  There is moderate bilateral facet arthropathy.  No spinal canal stenosis.     IMPRESSION:      Moderate size left foraminal disc extrusion at L5-S1, likely impinging upon the exiting left L5 nerve root.     Additional lumbar spondylosis, resulting in mild neural foraminal narrowing at L1-2, L2-3, and L4-5, as above.  No spinal canal stenosis.    Allergies:   Review of patient's allergies indicates:   Allergen Reactions    Azathioprine Shortness Of Breath and Other (See Comments)     Fatigue       Current  Medications:   Current Outpatient Medications   Medication Sig Dispense Refill    ACCU-CHEK FASTCLIX LANCING DEV Kit USE AS DIRECTED. 1 each 0    ALCOHOL ANTISEPTIC PADS (ALCOHOL PREP PADS TOP)       atorvastatin (LIPITOR) 20 MG tablet Take 1 tablet (20 mg total) by mouth once daily. 90 tablet 3    blood sugar diagnostic (ACCU-CHEK SMARTVIEW TEST STRIP) Strp Use as directed to check blood sugar twice daily. 200 strip 3    blood-glucose meter kit Use as instructed 1 each 0    carvediloL (COREG) 25 MG tablet Take 1 tablet (25 mg total) by mouth 2 (two) times daily. 180 tablet 1    cloNIDine (CATAPRES) 0.3 MG tablet Take 1 tablet (0.3 mg total) by mouth 3 (three) times daily. 270 tablet 1    diaper,brief,adult,disposable Misc       epoetin german-epbx (RETACRIT) 10,000 unit/mL imjection Inject 20,000 Units into the skin every 14 (fourteen) days.      fenofibrate 160 MG Tab Take 1 tablet by mouth once daily 90 tablet 3    FLUZONE HIGH-DOSE 2019-20, PF, 180 mcg/0.5 mL Syrg PHARMACIST ADMINISTERED IMMUNIZATION ADMINISTERED AT TIME OF DISPENSING  0    folic acid (FOLVITE) 1 MG tablet Take 1 tablet (1,000 mcg total) by mouth once daily. 90 tablet 0    gabapentin (NEURONTIN) 400 MG capsule Take 1 capsule (400 mg total) by mouth 3 (three) times daily. 90 capsule 0    HYDROcodone-acetaminophen (NORCO) 5-325 mg per tablet Take 1 tablet by mouth every 6 (six) hours as needed. 90 tablet 0    [START ON 10/2/2020] HYDROcodone-acetaminophen (NORCO) 5-325 mg per tablet Take 1 tablet by mouth every 6 (six) hours as needed. 90 tablet 0    [START ON 11/2/2020] HYDROcodone-acetaminophen (NORCO) 5-325 mg per tablet Take 1 tablet by mouth every 6 (six) hours as needed. 90 tablet 0    levothyroxine (SYNTHROID) 50 MCG tablet TAKE 1 TABLET BY MOUTH ONCE DAILY BEFORE BREAKFAST 90 tablet 1    metFORMIN (GLUCOPHAGE) 1000 MG tablet Take 1 tablet (1,000 mg total) by mouth 2 (two) times daily with meals. 180 tablet 1    NIFEdipine  (PROCARDIA-XL) 30 MG (OSM) 24 hr tablet Take 1 tablet by mouth once daily 90 tablet 1    predniSONE (DELTASONE) 5 MG tablet Take 1.5 tablets (7.5 mg total) by mouth once daily. (Patient taking differently: Take 5 mg by mouth once daily. ) 135 tablet 1    tiZANidine (ZANAFLEX) 2 MG tablet TAKE 2 TABLETS BY MOUTH EVERY 8 HOURS AS NEEDED 270 tablet 0    calcium carbonate (OS-MALCOLM) 600 mg calcium (1,500 mg) Tab Take 1 tablet by mouth 2 (two) times daily.       oxybutynin (DITROPAN-XL) 5 MG TR24 Take 1 tablet (5 mg total) by mouth once daily. 30 tablet 11     No current facility-administered medications for this visit.      Facility-Administered Medications Ordered in Other Visits   Medication Dose Route Frequency Provider Last Rate Last Dose    0.9%  NaCl infusion  500 mL Intravenous Continuous Ever Resendez MD           REVIEW OF SYSTEMS:    GENERAL:  No weight loss, malaise or fevers.  HEENT:  Negative for frequent or significant headaches.  NECK:  Negative for lumps, goiter, pain and significant neck swelling.  RESPIRATORY:  Negative for cough, wheezing or shortness of breath.  CARDIOVASCULAR:  Negative for chest pain, leg swelling or palpitations. HTN  GI:  Negative for abdominal discomfort, blood in stools or black stools or change in bowel habits.  RENAL: CKD  MUSCULOSKELETAL:  See HPI.  SKIN:  Negative for lesions, rash, and itching.  PSYCH:  Negative for sleep disturbance, mood disorder and recent psychosocial stressors.  HEMATOLOGY/LYMPHOLOGY:  Negative for prolonged bleeding, bruising easily or swollen nodes.  NEURO:   No history of headaches, syncope, paralysis, seizures or tremors.  ENDO: Diabetes, thyroid disorder.   All other reviewed and negative other than HPI.    Past Medical History:  Past Medical History:   Diagnosis Date    Acid reflux     Allergy     Alopecia     Anemia     Anemia in CKD (chronic kidney disease) 9/22/2016    Anxiety     Arthritis     Back pain     Cataract      Chronic kidney disease     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, without long-term current use of insulin     Depression     Diabetes mellitus, type 2     Eye injury as a child     k-abrasion  od    Hyperlipidemia     Hypertension     Hypothyroidism     Immune deficiency disorder     Immune disorder     Myalgia and myositis 2012    Osteoporosis     Polyneuropathy     Renal manifestation of secondary diabetes mellitus     Sarcoidosis     Type 2 diabetes mellitus     Ulcer     no cancer    Urinary incontinence        Past Surgical History:  Past Surgical History:   Procedure Laterality Date    CARPAL TUNNEL RELEASE      Rt wrist    CATARACT EXTRACTION W/  INTRAOCULAR LENS IMPLANT Right 2015    Dr. Azevedo    CATARACT EXTRACTION W/  INTRAOCULAR LENS IMPLANT Left 2015    Dr. Azevedo     SECTION      CHOLECYSTECTOMY      INJECTION OF JOINT Left 3/21/2019    Procedure: Injection, Joint  fLUOROSCOPIC jOINT iNJECTION (hIP iNJECTION) LEFT ROCH BURSA AS WELL LEFT TROCHANTERIC BURSA;  Surgeon: Alfonso Richards MD;  Location: [facility];  Service: Pain Management;  Laterality: Left;  NEEDS CONSENT, DIABETIC    INJECTION OF JOINT Left 2019    Procedure: Injection, Joint FLUOROSCOPIC JOINT INJECTION (HIP INJECTION) LEFT HIP;  Surgeon: Alfonso Richards MD;  Location: [facility];  Service: Pain Management;  Laterality: Left;  NEEDS CONSENT    INJECTION OF JOINT Left 2019    Procedure: INJECTION, JOINT;  Surgeon: Alfonso Richards MD;  Location: [facility];  Service: Pain Management;  Laterality: Left;  Left Hip and Left GTB Injections    INJECTION OF JOINT Left 2020    Procedure: INJECTION, JOINT, LEFT HIP and LEFT GREATER TROCHANTERIC BURSA;  Surgeon: Alfonso Richards MD;  Location: [facility];  Service: Pain Management;  Laterality: Left;  INJECTION, JOINT, LEFT HIP and LEFT GREATER  TROCHANTERIC BURSA    INJECTION OF JOINT Left 9/3/2020    Procedure: INJECTION, JOINT, LEFT SI;  Surgeon: Alfonso Richards MD;  Location: [facility];  Service: Pain Management;  Laterality: Left;  INJECTION, JOINT, LEFT SI    TRANSFORAMINAL EPIDURAL INJECTION OF STEROID Bilateral 12/5/2019    Procedure: INJECTION, STEROID, EPIDURAL, TRANSFORAMINAL APPROACH;  Surgeon: Alfonso Richards MD;  Location: [facility];  Service: Pain Management;  Laterality: Bilateral;  B/L TF RHIANNA L5  Consent Needed    TRANSFORAMINAL EPIDURAL INJECTION OF STEROID Bilateral 6/29/2020    Procedure: INJECTION, STEROID, EPIDURAL, TRANSFORAMINAL APPROACH L5/S1;  Surgeon: Alfonso Richards MD;  Location: [facility];  Service: Pain Management;  Laterality: Bilateral;  B/L TF RHIANNA L5/S1    TUBAL LIGATION         Family History:  Family History   Problem Relation Age of Onset    Hypertension Mother     Cataracts Mother     No Known Problems Father     Hypertension Maternal Grandmother     Glaucoma Sister     Arthritis Sister     No Known Problems Brother     No Known Problems Maternal Aunt     No Known Problems Maternal Uncle     No Known Problems Paternal Aunt     No Known Problems Paternal Uncle     No Known Problems Maternal Grandfather     No Known Problems Paternal Grandmother     No Known Problems Paternal Grandfather     Kidney failure Sister     Hepatitis Sister     Cancer Sister         bone cancer     Immunodeficiency Sister     Diabetes Son     Hypertension Son     Lupus Neg Hx     Rheum arthritis Neg Hx     Amblyopia Neg Hx     Blindness Neg Hx     Macular degeneration Neg Hx     Retinal detachment Neg Hx     Strabismus Neg Hx     Stroke Neg Hx     Thyroid disease Neg Hx     Endometrial cancer Neg Hx     Vaginal cancer Neg Hx     Cervical cancer Neg Hx        Social History:  Social History     Socioeconomic History    Marital status:      Spouse name: Gasper     Number of children: 5    Years of  "education: Business school after high school    Highest education level: 12th grade   Occupational History    Not on file   Social Needs    Financial resource strain: Not very hard    Food insecurity     Worry: Never true     Inability: Never true    Transportation needs     Medical: No     Non-medical: No   Tobacco Use    Smoking status: Never Smoker    Smokeless tobacco: Never Used   Substance and Sexual Activity    Alcohol use: No    Drug use: No    Sexual activity: Not Currently     Partners: Male   Lifestyle    Physical activity     Days per week: 0 days     Minutes per session: 0 min    Stress: Not at all   Relationships    Social connections     Talks on phone: Once a week     Gets together: Once a week     Attends Baptism service: Never     Active member of club or organization: No     Attends meetings of clubs or organizations: Never     Relationship status:    Other Topics Concern    Not on file   Social History Narrative    Not on file       OBJECTIVE:    /75   Pulse 94   Temp 98.2 °F (36.8 °C) (Oral)   Resp 18   Ht 5' 1" (1.549 m)   Wt 55.3 kg (122 lb)   LMP  (LMP Unknown)   BMI 23.05 kg/m²     PHYSICAL EXAMINATION:    General appearance: Well appearing, in no acute distress, alert and oriented x3.  Psych:  Mood and affect appropriate.  Skin: Skin color, texture, turgor normal, no rashes or lesions, in both upper and lower body.  Head/face:  Atraumatic, normocephalic. No palpable lymph nodes  Cor: RRR  Pulm: CTA  GI: Abdomen soft and non-tender.  Back: Straight leg raising in the sitting position is negative to radicular pain bilaterally. There is mild pain with palpation over lumbar facet joints. Limited ROM with mild pain on extension. Positive facet loading bilaterally.   Extremities: Peripheral joint ROM is full and pain free without obvious instability or laxity in all four extremities. No deformities, edema, or skin discoloration. Good capillary " refill.  Musculoskeletal: Shoulder and knee provocative maneuvers are negative. There is no pain with internal and external rotation of left hip. There is mild pain with palpation over left SI joint. FABERs is positive on the left. There is mild pain with palpation over left GTB. Bilateral lower extremity strength is normal and symmetric.  No atrophy or tone abnormalities are noted.  Neuro: Bilateral lower extremity coordination and muscle stretch reflexes are physiologic and symmetric.  Plantar response are downgoing. No loss of sensation is noted.  Gait: Antalgic- ambulates with electric scooter.     ASSESSMENT: 74 y.o. year old female with low back and hip pain, consistent with the followin. Sacroiliac joint pain     2. Primary osteoarthritis of left hip     3. Greater trochanteric bursitis of left hip     4. Lumbar spondylosis     5. DDD (degenerative disc disease), lumbar     6. Chronic pain disorder           PLAN:     - Previous imaging was reviewed and discussed with the patient today.    - She is s/p left SI joint injection with benefit. We can repeat this as needed.     - She is s/p bilateral L5/S1 TF RHIANNA with benefit. We can repeat this as needed.     - Continue current medications.     - I have stressed the importance of physical activity and a home exercise plan to help with pain and improve health.    - RTC in 1 month or sooner if needed.      - Counseled patient regarding the importance of activity modification and constant sleeping habits.    - Dr. Richards was consulted on the patient and agrees with this plan.    The above plan and management options were discussed at length with patient. Patient is in agreement with the above and verbalized understanding.    Jenny Balbuena NP  2020

## 2020-09-18 ENCOUNTER — OFFICE VISIT (OUTPATIENT)
Dept: OPHTHALMOLOGY | Facility: CLINIC | Age: 75
End: 2020-09-18
Payer: MEDICARE

## 2020-09-18 DIAGNOSIS — H34.8120 HEMISPHERIC RETINAL VEIN OCCLUSION WITH MACULAR EDEMA OF LEFT EYE: ICD-10-CM

## 2020-09-18 DIAGNOSIS — E11.9 DM TYPE 2 WITHOUT RETINOPATHY: ICD-10-CM

## 2020-09-18 DIAGNOSIS — H04.123 DRY EYE SYNDROME, BILATERAL: Primary | ICD-10-CM

## 2020-09-18 DIAGNOSIS — H17.9 CORNEAL SCAR, RIGHT EYE: ICD-10-CM

## 2020-09-18 DIAGNOSIS — H52.7 REFRACTIVE ERROR: ICD-10-CM

## 2020-09-18 DIAGNOSIS — H10.13 ALLERGIC CONJUNCTIVITIS OF BOTH EYES: ICD-10-CM

## 2020-09-18 DIAGNOSIS — Z96.1 PSEUDOPHAKIA: ICD-10-CM

## 2020-09-18 PROCEDURE — 92014 COMPRE OPH EXAM EST PT 1/>: CPT | Mod: HCNC,S$GLB,, | Performed by: OPHTHALMOLOGY

## 2020-09-18 PROCEDURE — 92014 PR EYE EXAM, EST PATIENT,COMPREHESV: ICD-10-PCS | Mod: HCNC,S$GLB,, | Performed by: OPHTHALMOLOGY

## 2020-09-18 PROCEDURE — 99999 PR PBB SHADOW E&M-EST. PATIENT-LVL III: ICD-10-PCS | Mod: PBBFAC,HCNC,, | Performed by: OPHTHALMOLOGY

## 2020-09-18 PROCEDURE — 99999 PR PBB SHADOW E&M-EST. PATIENT-LVL III: CPT | Mod: PBBFAC,HCNC,, | Performed by: OPHTHALMOLOGY

## 2020-09-18 RX ORDER — LEVOTHYROXINE SODIUM 50 UG/1
TABLET ORAL
Qty: 90 TABLET | Refills: 1 | Status: SHIPPED | OUTPATIENT
Start: 2020-09-18 | End: 2020-12-04 | Stop reason: SDUPTHER

## 2020-09-18 RX ORDER — INFLUENZA A VIRUS A/MICHIGAN/45/2015 X-275 (H1N1) ANTIGEN (FORMALDEHYDE INACTIVATED), INFLUENZA A VIRUS A/SINGAPORE/INFIMH-16-0019/2016 IVR-186 (H3N2) ANTIGEN (FORMALDEHYDE INACTIVATED), INFLUENZA B VIRUS B/PHUKET/3073/2013 ANTIGEN (FORMALDEHYDE INACTIVATED), AND INFLUENZA B VIRUS B/MARYLAND/15/2016 BX-69A ANTIGEN (FORMALDEHYDE INACTIVATED) 60; 60; 60; 60 UG/.7ML; UG/.7ML; UG/.7ML; UG/.7ML
INJECTION, SUSPENSION INTRAMUSCULAR
COMMUNITY
Start: 2020-09-08 | End: 2021-01-14

## 2020-09-19 NOTE — PROGRESS NOTES
Subjective:       Patient ID: Regino Lawrence is a 74 y.o. female.    Chief Complaint: Diabetic Eye Exam    HPI     74 y.o. female is here for DM Check. H/o HRVO with macular edema of left   eye. Lids constantly itch within the last month or so, left eye .   Sometimes eyes burn and tear. Used Pataday which helped per pt. Denies   f/f. Blurred vision at distance and near with current glasses. No problems   with glare.     Eye Med's: Equate At's BID OU     Last edited by MARA Ford on 9/18/2020  2:04 PM. (History)             Assessment:       1. Dry eye syndrome, bilateral    2. Allergic conjunctivitis of both eyes    3. Corneal scar, right eye    4. Hemispheric retinal vein occlusion with macular edema of left eye    5. DM type 2 without retinopathy    6. Refractive error    7. Pseudophakia        Plan:       JOSELINE-Needs more AT's.  Allergic conj OU-Doing well with Pataday.  K scar OD-Stable.  HRVO with ME OS s/p Av-Followed by Dr Baker.  DM-No NPDR OD.  RE-Pt does not want MRx.      AT's.  Pataday.  Control DM.  F/U with Dr Baker as scheduled in 1 month.  RTC me in 1 yr.

## 2020-09-21 DIAGNOSIS — G72.9 MYOPATHY: ICD-10-CM

## 2020-09-21 DIAGNOSIS — D86.9 SARCOIDOSIS: Chronic | ICD-10-CM

## 2020-09-22 RX ORDER — FOLIC ACID 1 MG/1
1000 TABLET ORAL DAILY
Qty: 90 TABLET | Refills: 0 | Status: SHIPPED | OUTPATIENT
Start: 2020-09-22 | End: 2021-01-04 | Stop reason: SDUPTHER

## 2020-09-25 ENCOUNTER — INFUSION (OUTPATIENT)
Dept: INFUSION THERAPY | Facility: HOSPITAL | Age: 75
End: 2020-09-25
Attending: INTERNAL MEDICINE
Payer: MEDICARE

## 2020-09-25 VITALS
HEART RATE: 77 BPM | SYSTOLIC BLOOD PRESSURE: 100 MMHG | TEMPERATURE: 98 F | DIASTOLIC BLOOD PRESSURE: 59 MMHG | RESPIRATION RATE: 17 BRPM | OXYGEN SATURATION: 97 %

## 2020-09-25 DIAGNOSIS — Z53.1 REFUSAL OF BLOOD TRANSFUSIONS AS PATIENT IS JEHOVAH'S WITNESS: ICD-10-CM

## 2020-09-25 DIAGNOSIS — D50.9 IRON DEFICIENCY ANEMIA, UNSPECIFIED IRON DEFICIENCY ANEMIA TYPE: ICD-10-CM

## 2020-09-25 DIAGNOSIS — N18.9 ANEMIA IN CHRONIC KIDNEY DISEASE, UNSPECIFIED CKD STAGE: Primary | ICD-10-CM

## 2020-09-25 DIAGNOSIS — D63.1 ANEMIA IN CHRONIC KIDNEY DISEASE, UNSPECIFIED CKD STAGE: Primary | ICD-10-CM

## 2020-09-25 PROCEDURE — 63600175 PHARM REV CODE 636 W HCPCS: Mod: HCNC | Performed by: INTERNAL MEDICINE

## 2020-09-25 PROCEDURE — 96372 THER/PROPH/DIAG INJ SC/IM: CPT | Mod: HCNC

## 2020-09-25 RX ADMIN — EPOETIN ALFA-EPBX 20000 UNITS: 10000 INJECTION, SOLUTION INTRAVENOUS; SUBCUTANEOUS at 12:09

## 2020-09-25 NOTE — PLAN OF CARE
Hgb 9.8 (from 11.3 two weeks earlier) Patient received RETAcrit 20,000 units. Tolerated injection well. VSS. Reports increased fatigue and generalized body aches. She denies any abd pain, bloody stool, hematuria, or any other sxs of possible blood loss. She received discharge instructions and follow up appointments. She will return in 2 weeks for routine labs and retacrit pending results. Patient verbalized understanding and discharged from unit via mobile scooter by self. Patient in NAD at time of discharge.

## 2020-09-29 ENCOUNTER — PATIENT MESSAGE (OUTPATIENT)
Dept: OTHER | Facility: OTHER | Age: 75
End: 2020-09-29

## 2020-10-09 ENCOUNTER — INFUSION (OUTPATIENT)
Dept: INFUSION THERAPY | Facility: HOSPITAL | Age: 75
End: 2020-10-09
Attending: INTERNAL MEDICINE
Payer: MEDICARE

## 2020-10-09 VITALS
RESPIRATION RATE: 17 BRPM | OXYGEN SATURATION: 97 % | DIASTOLIC BLOOD PRESSURE: 64 MMHG | HEART RATE: 87 BPM | SYSTOLIC BLOOD PRESSURE: 123 MMHG | TEMPERATURE: 98 F

## 2020-10-09 DIAGNOSIS — D50.9 IRON DEFICIENCY ANEMIA, UNSPECIFIED IRON DEFICIENCY ANEMIA TYPE: ICD-10-CM

## 2020-10-09 DIAGNOSIS — N18.9 ANEMIA IN CHRONIC KIDNEY DISEASE, UNSPECIFIED CKD STAGE: Primary | ICD-10-CM

## 2020-10-09 DIAGNOSIS — D63.1 ANEMIA IN CHRONIC KIDNEY DISEASE, UNSPECIFIED CKD STAGE: Primary | ICD-10-CM

## 2020-10-09 DIAGNOSIS — Z53.1 REFUSAL OF BLOOD TRANSFUSIONS AS PATIENT IS JEHOVAH'S WITNESS: ICD-10-CM

## 2020-10-09 PROCEDURE — 96372 THER/PROPH/DIAG INJ SC/IM: CPT | Mod: HCNC

## 2020-10-09 PROCEDURE — 63600175 PHARM REV CODE 636 W HCPCS: Mod: HCNC | Performed by: INTERNAL MEDICINE

## 2020-10-09 RX ADMIN — EPOETIN ALFA-EPBX 20000 UNITS: 10000 INJECTION, SOLUTION INTRAVENOUS; SUBCUTANEOUS at 11:10

## 2020-10-09 NOTE — PLAN OF CARE
Patient arrived to unit for q2w Retacrit injection. Plan of care reviewed, patient agreeable to plan. Patient tolerated injection well. VSS. Discharge instructions reviewed, patient instructed to return 10/23/20 for labs and next injection. Patient exited off unit via mobility scooter. Patient in NAD at time of discharge.

## 2020-10-14 ENCOUNTER — OFFICE VISIT (OUTPATIENT)
Dept: FAMILY MEDICINE | Facility: CLINIC | Age: 75
End: 2020-10-14
Payer: MEDICARE

## 2020-10-14 VITALS
HEART RATE: 76 BPM | WEIGHT: 122.13 LBS | OXYGEN SATURATION: 96 % | HEIGHT: 61 IN | TEMPERATURE: 98 F | SYSTOLIC BLOOD PRESSURE: 136 MMHG | BODY MASS INDEX: 23.06 KG/M2 | DIASTOLIC BLOOD PRESSURE: 70 MMHG

## 2020-10-14 DIAGNOSIS — G89.4 CHRONIC PAIN SYNDROME: ICD-10-CM

## 2020-10-14 DIAGNOSIS — M51.36 DEGENERATIVE DISC DISEASE, LUMBAR: ICD-10-CM

## 2020-10-14 DIAGNOSIS — M48.062 LUMBAR STENOSIS WITH NEUROGENIC CLAUDICATION: Primary | ICD-10-CM

## 2020-10-14 DIAGNOSIS — R63.0 POOR APPETITE: ICD-10-CM

## 2020-10-14 DIAGNOSIS — M54.16 LUMBAR RADICULOPATHY: ICD-10-CM

## 2020-10-14 DIAGNOSIS — M70.62 GREATER TROCHANTERIC BURSITIS OF LEFT HIP: ICD-10-CM

## 2020-10-14 DIAGNOSIS — F11.90 CHRONIC, CONTINUOUS USE OF OPIOIDS: ICD-10-CM

## 2020-10-14 PROCEDURE — 1159F MED LIST DOCD IN RCRD: CPT | Mod: HCNC,S$GLB,, | Performed by: FAMILY MEDICINE

## 2020-10-14 PROCEDURE — 99214 PR OFFICE/OUTPT VISIT, EST, LEVL IV, 30-39 MIN: ICD-10-PCS | Mod: HCNC,S$GLB,, | Performed by: FAMILY MEDICINE

## 2020-10-14 PROCEDURE — 99999 PR PBB SHADOW E&M-EST. PATIENT-LVL V: CPT | Mod: PBBFAC,HCNC,, | Performed by: FAMILY MEDICINE

## 2020-10-14 PROCEDURE — 1126F AMNT PAIN NOTED NONE PRSNT: CPT | Mod: HCNC,S$GLB,, | Performed by: FAMILY MEDICINE

## 2020-10-14 PROCEDURE — 3078F PR MOST RECENT DIASTOLIC BLOOD PRESSURE < 80 MM HG: ICD-10-PCS | Mod: HCNC,CPTII,S$GLB, | Performed by: FAMILY MEDICINE

## 2020-10-14 PROCEDURE — 3008F BODY MASS INDEX DOCD: CPT | Mod: HCNC,CPTII,S$GLB, | Performed by: FAMILY MEDICINE

## 2020-10-14 PROCEDURE — 3075F PR MOST RECENT SYSTOLIC BLOOD PRESS GE 130-139MM HG: ICD-10-PCS | Mod: HCNC,CPTII,S$GLB, | Performed by: FAMILY MEDICINE

## 2020-10-14 PROCEDURE — 99214 OFFICE O/P EST MOD 30 MIN: CPT | Mod: HCNC,S$GLB,, | Performed by: FAMILY MEDICINE

## 2020-10-14 PROCEDURE — 1159F PR MEDICATION LIST DOCUMENTED IN MEDICAL RECORD: ICD-10-PCS | Mod: HCNC,S$GLB,, | Performed by: FAMILY MEDICINE

## 2020-10-14 PROCEDURE — 3008F PR BODY MASS INDEX (BMI) DOCUMENTED: ICD-10-PCS | Mod: HCNC,CPTII,S$GLB, | Performed by: FAMILY MEDICINE

## 2020-10-14 PROCEDURE — 3075F SYST BP GE 130 - 139MM HG: CPT | Mod: HCNC,CPTII,S$GLB, | Performed by: FAMILY MEDICINE

## 2020-10-14 PROCEDURE — 1101F PT FALLS ASSESS-DOCD LE1/YR: CPT | Mod: HCNC,CPTII,S$GLB, | Performed by: FAMILY MEDICINE

## 2020-10-14 PROCEDURE — 1101F PR PT FALLS ASSESS DOC 0-1 FALLS W/OUT INJ PAST YR: ICD-10-PCS | Mod: HCNC,CPTII,S$GLB, | Performed by: FAMILY MEDICINE

## 2020-10-14 PROCEDURE — 1126F PR PAIN SEVERITY QUANTIFIED, NO PAIN PRESENT: ICD-10-PCS | Mod: HCNC,S$GLB,, | Performed by: FAMILY MEDICINE

## 2020-10-14 PROCEDURE — 3078F DIAST BP <80 MM HG: CPT | Mod: HCNC,CPTII,S$GLB, | Performed by: FAMILY MEDICINE

## 2020-10-14 PROCEDURE — 99999 PR PBB SHADOW E&M-EST. PATIENT-LVL V: ICD-10-PCS | Mod: PBBFAC,HCNC,, | Performed by: FAMILY MEDICINE

## 2020-10-14 RX ORDER — HYDROCODONE BITARTRATE AND ACETAMINOPHEN 5; 325 MG/1; MG/1
1 TABLET ORAL EVERY 6 HOURS PRN
Qty: 90 TABLET | Refills: 0 | Status: SHIPPED | OUTPATIENT
Start: 2020-11-14 | End: 2020-12-14

## 2020-10-14 RX ORDER — HYDROCODONE BITARTRATE AND ACETAMINOPHEN 5; 325 MG/1; MG/1
1 TABLET ORAL EVERY 6 HOURS PRN
Qty: 90 TABLET | Refills: 0 | Status: SHIPPED | OUTPATIENT
Start: 2020-10-14 | End: 2020-11-13

## 2020-10-14 RX ORDER — HYDROCODONE BITARTRATE AND ACETAMINOPHEN 5; 325 MG/1; MG/1
1 TABLET ORAL EVERY 6 HOURS PRN
Qty: 90 TABLET | Refills: 0 | Status: SHIPPED | OUTPATIENT
Start: 2020-12-14 | End: 2021-01-14 | Stop reason: SDUPTHER

## 2020-10-16 NOTE — PROGRESS NOTES
Chief Complaint   Patient presents with    chronic condition    Chronic Pain       HPI  Regino Lawrence is a 74 y.o. female with multiple medical diagnoses as listed in the medical history and problem list that presents for follow-up for chronic pain    Chronic pain  This is currently in her lower back and both hips. She has had an injection in her SI joint and had some improvement for a short amount of time but her pain has returned. She has continued to have weakness in her gait and feels unsteady with standing she tries to move around at home but is worried she will fall    HPI    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, without long-term current use of insulin    Sarcoidosis    Corneal scar, right eye    Nuclear sclerosis    Senile nuclear sclerosis    Immunosuppression    Left shoulder pain    Cervical spinal stenosis    Patient is Advent    GERD (gastroesophageal reflux disease)    Debility    S/P cervical spinal fusion    Chronic bilateral low back pain with left-sided sciatica    Degenerative disc disease, lumbar    Poor motor control of trunk    Impaired mobility    Muscle weakness    Iron deficiency anemia    Anemia in CKD (chronic kidney disease)    Refusal of blood transfusions as patient is Advent    Current use of steroid medication    DM type 2 without retinopathy    Pseudophakia    Insufficiency of tear film of both eyes    Refractive error    Myopathy    Osteopenia    Calcification of aorta    Dry eye syndrome, bilateral    Neck pain    Lumbar disc herniation with  "radiculopathy    Antalgic gait    Lumbar radiculopathy    Lumbar stenosis with neurogenic claudication    Anemia in chronic kidney disease    Greater trochanteric bursitis of left hip    Left hip pain    FAUSTIN (dyspnea on exertion)    Chest pain, atypical    Hemispheric retinal vein occlusion with macular edema of left eye    Degenerative joint disease (DJD) of hip    Chronic pain    Left sided numbness    Sacroiliitis    Chronic, continuous use of opioids    Osteoarthritis of hip         ROS  Review of Systems   Constitutional: Negative for chills, diaphoresis, fatigue, fever and unexpected weight change.   HENT: Negative for rhinorrhea, sinus pressure, sore throat and tinnitus.    Eyes: Negative for photophobia and visual disturbance.   Respiratory: Negative for cough, shortness of breath and wheezing.    Cardiovascular: Negative for chest pain and palpitations.   Gastrointestinal: Negative for abdominal pain, blood in stool, constipation, diarrhea, nausea and vomiting.   Genitourinary: Negative for dysuria, flank pain, frequency and vaginal discharge.   Musculoskeletal: Positive for arthralgias and back pain. Negative for joint swelling.   Skin: Negative for rash.   Neurological: Negative for speech difficulty, weakness, light-headedness and headaches.   Psychiatric/Behavioral: Negative for behavioral problems and dysphoric mood.       Physical Exam  Vitals:    10/14/20 1456   BP: 136/70   BP Location: Left arm   Patient Position: Sitting   BP Method: Medium (Manual)   Pulse: 76   Temp: 98.3 °F (36.8 °C)   TempSrc: Oral   SpO2: 96%   Weight: 55.4 kg (122 lb 2.2 oz)   Height: 5' 1" (1.549 m)    Body mass index is 23.08 kg/m².  Weight: 55.4 kg (122 lb 2.2 oz)   Height: 5' 1" (154.9 cm)     Physical Exam  Vitals signs and nursing note reviewed.   Constitutional:       Appearance: She is well-developed.   Skin:     General: Skin is warm and dry.      Findings: No erythema or rash.   Neurological:      " Mental Status: She is alert and oriented to person, place, and time.   Psychiatric:         Behavior: Behavior normal.         ALLERGIES AND MEDICATIONS: updated and reviewed.  Review of patient's allergies indicates:   Allergen Reactions    Azathioprine Shortness Of Breath and Other (See Comments)     Fatigue     Medication List with Changes/Refills   New Medications    HYDROCODONE-ACETAMINOPHEN (NORCO) 5-325 MG PER TABLET    Take 1 tablet by mouth every 6 (six) hours as needed.    HYDROCODONE-ACETAMINOPHEN (NORCO) 5-325 MG PER TABLET    Take 1 tablet by mouth every 6 (six) hours as needed.   Current Medications    ACCU-CHEK FASTCLIX LANCING DEV KIT    USE AS DIRECTED.    ALCOHOL ANTISEPTIC PADS (ALCOHOL PREP PADS TOP)        ATORVASTATIN (LIPITOR) 20 MG TABLET    Take 1 tablet (20 mg total) by mouth once daily.    BLOOD SUGAR DIAGNOSTIC (ACCU-CHEK SMARTVIEW TEST STRIP) STRP    Use as directed to check blood sugar twice daily.    BLOOD-GLUCOSE METER KIT    Use as instructed    CALCIUM CARBONATE (OS-MALCOLM) 600 MG CALCIUM (1,500 MG) TAB    Take 1 tablet by mouth 2 (two) times daily.     CARVEDILOL (COREG) 25 MG TABLET    Take 1 tablet (25 mg total) by mouth 2 (two) times daily.    CLONIDINE (CATAPRES) 0.3 MG TABLET    Take 1 tablet (0.3 mg total) by mouth 3 (three) times daily.    DIAPER,BRIEF,ADULT,DISPOSABLE MISC        EPOETIN WOLFGANG-EPBX (RETACRIT) 10,000 UNIT/ML IMJECTION    Inject 20,000 Units into the skin every 14 (fourteen) days.    FENOFIBRATE 160 MG TAB    Take 1 tablet by mouth once daily    FLUZONE HIGH-DOSE 2019-20, PF, 180 MCG/0.5 ML SYRG    PHARMACIST ADMINISTERED IMMUNIZATION ADMINISTERED AT TIME OF DISPENSING    FLUZONE HIGHDOSE QUAD 20-21  MCG/0.7 ML SYRG    PHARMACIST ADMINISTERED IMMUNIZATION ADMINISTERED AT TIME OF DISPENSING    FOLIC ACID (FOLVITE) 1 MG TABLET    Take 1 tablet (1,000 mcg total) by mouth once daily.    GABAPENTIN (NEURONTIN) 400 MG CAPSULE    Take 1 capsule (400 mg total) by  mouth 3 (three) times daily.    HYDROCODONE-ACETAMINOPHEN (NORCO) 5-325 MG PER TABLET    Take 1 tablet by mouth every 6 (six) hours as needed.    LEVOTHYROXINE (EUTHYROX) 50 MCG TABLET    TAKE 1 TABLET BY MOUTH ONCE DAILY BEFORE BREAKFAST    METFORMIN (GLUCOPHAGE) 1000 MG TABLET    Take 1 tablet (1,000 mg total) by mouth 2 (two) times daily with meals.    NIFEDIPINE (PROCARDIA-XL) 30 MG (OSM) 24 HR TABLET    Take 1 tablet by mouth once daily    OXYBUTYNIN (DITROPAN-XL) 5 MG TR24    Take 1 tablet (5 mg total) by mouth once daily.    PREDNISONE (DELTASONE) 5 MG TABLET    Take 1.5 tablets (7.5 mg total) by mouth once daily.   Changed and/or Refilled Medications    Modified Medication Previous Medication    HYDROCODONE-ACETAMINOPHEN (NORCO) 5-325 MG PER TABLET HYDROcodone-acetaminophen (NORCO) 5-325 mg per tablet       Take 1 tablet by mouth every 6 (six) hours as needed.    Take 1 tablet by mouth every 6 (six) hours as needed.    TIZANIDINE (ZANAFLEX) 2 MG TABLET tiZANidine (ZANAFLEX) 2 MG tablet       Take 2 tablets (4 mg total) by mouth every 8 (eight) hours as needed.    TAKE 2 TABLETS BY MOUTH EVERY 8 HOURS AS NEEDED       Assessment & Plan  1. Lumbar stenosis with neurogenic claudication    2. Degenerative disc disease, lumbar    3. Greater trochanteric bursitis of left hip    4. Lumbar radiculopathy    5. Chronic pain syndrome    6. Chronic, continuous use of opioids    7. Poor appetite        Problem List Items Addressed This Visit        Neuro    Chronic pain  -encouraged movement with HH with PT    Degenerative disc disease, lumbar  -continue current regimen and f/u with pain mgmt  Will benefit from gait training    Relevant Medications    HYDROcodone-acetaminophen (NORCO) 5-325 mg per tablet    HYDROcodone-acetaminophen (NORCO) 5-325 mg per tablet (Start on 11/14/2020)    HYDROcodone-acetaminophen (NORCO) 5-325 mg per tablet (Start on 12/14/2020)    Other Relevant Orders    Ambulatory referral/consult to Home  Health    Lumbar radiculopathy  -continue follow up with pain management    Lumbar stenosis with neurogenic claudication - Primary  -benefit from gait training to improve balance    Relevant Orders    Ambulatory referral/consult to Home Health       Psychiatric    Chronic, continuous use of opioids  -pain controlled on current dose of norco       Orthopedic    Greater trochanteric bursitis of left hip  -s/p RHIANNA and SI injections however spends time trying to ambulate at home but is limited by pain      Other Visit Diagnoses     Poor appetite      -she is drinking ensures daily to improve her intake          Follow up in about 3 months (around 1/14/2021) for Follow up.    Other Orders Placed This Visit:  Orders Placed This Encounter   Procedures    Ambulatory referral/consult to Home Health

## 2020-10-19 PROCEDURE — G0180 MD CERTIFICATION HHA PATIENT: HCPCS | Mod: ,,, | Performed by: FAMILY MEDICINE

## 2020-10-19 PROCEDURE — G0180 PR HOME HEALTH MD CERTIFICATION: ICD-10-PCS | Mod: ,,, | Performed by: FAMILY MEDICINE

## 2020-10-20 ENCOUNTER — TELEPHONE (OUTPATIENT)
Dept: FAMILY MEDICINE | Facility: CLINIC | Age: 75
End: 2020-10-20

## 2020-10-20 NOTE — TELEPHONE ENCOUNTER
----- Message from Zhane Sears sent at 10/20/2020  9:39 AM CDT -----      Name of Who is Calling: Home Health      What is the request in detail: Home Health called started home physical therapy yesterday medication interaction between coreg and clonidine.Please contact to further discuss and advise.           Can the clinic reply by [facility]: N      What Number to Call Back if not in [facility]: [facility] [phone]

## 2020-10-21 ENCOUNTER — OFFICE VISIT (OUTPATIENT)
Dept: OPHTHALMOLOGY | Facility: CLINIC | Age: 75
End: 2020-10-21
Attending: OPHTHALMOLOGY
Payer: MEDICARE

## 2020-10-21 DIAGNOSIS — H34.8120 HEMISPHERIC RETINAL VEIN OCCLUSION WITH MACULAR EDEMA OF LEFT EYE: Primary | ICD-10-CM

## 2020-10-21 PROCEDURE — 92012 PR EYE EXAM, EST PATIENT,INTERMED: ICD-10-PCS | Mod: HCNC,S$GLB,, | Performed by: OPHTHALMOLOGY

## 2020-10-21 PROCEDURE — 99999 PR PBB SHADOW E&M-EST. PATIENT-LVL IV: CPT | Mod: PBBFAC,HCNC,, | Performed by: OPHTHALMOLOGY

## 2020-10-21 PROCEDURE — 92134 CPTRZ OPH DX IMG PST SGM RTA: CPT | Mod: HCNC,S$GLB,, | Performed by: OPHTHALMOLOGY

## 2020-10-21 PROCEDURE — 99999 PR PBB SHADOW E&M-EST. PATIENT-LVL IV: ICD-10-PCS | Mod: PBBFAC,HCNC,, | Performed by: OPHTHALMOLOGY

## 2020-10-21 PROCEDURE — 92134 POSTERIOR SEGMENT OCT RETINA (OCULAR COHERENCE TOMOGRAPHY)-BOTH EYES: ICD-10-PCS | Mod: HCNC,S$GLB,, | Performed by: OPHTHALMOLOGY

## 2020-10-21 PROCEDURE — 92012 INTRM OPH EXAM EST PATIENT: CPT | Mod: HCNC,S$GLB,, | Performed by: OPHTHALMOLOGY

## 2020-10-21 NOTE — PROGRESS NOTES
Subjective:       Patient ID: Regino Lawrence is a 74 y.o. female      Chief Complaint   Patient presents with    4 mo HRVO w/ ME OS     History of Present Illness  HPI     DLS 06/17/2020 by Dr. ROSARIO Baker MD    73 YO F here today for 4 mo OCT/ Comprehensive exam aftrer HRVO w/ ME OS   ck after Avastin OS.     CC: Pt reports no complications after last injection and no changes in   vision and no signs of floaters. stj     - eye pain  -headaches  -flashes/floaters    POHx:   1. HRVO w/ ME  S/P avastin OS 02/05/2020/ and 06/17/2020    Last edited by Alix Pedro MA on 10/21/2020  3:13 PM. (History)        Imaging:    See report    Assessment/Plan:     1. Hemispheric retinal vein occlusion with macular edema of left eye  No ME after 4 month inj interval 2 times.    Discussed q 4 mo inj vs attempt at obs and RBA  Pt wishes to observe    CV risk factor control    - Posterior Segment OCT Retina-Both eyes    Follow up in about 1 month (around 11/21/2020), or if symptoms worsen or fail to improve, for Dilated examination, OCT Mac.

## 2020-10-23 ENCOUNTER — INFUSION (OUTPATIENT)
Dept: INFUSION THERAPY | Facility: HOSPITAL | Age: 75
End: 2020-10-23
Attending: INTERNAL MEDICINE
Payer: MEDICARE

## 2020-10-23 VITALS
SYSTOLIC BLOOD PRESSURE: 111 MMHG | DIASTOLIC BLOOD PRESSURE: 61 MMHG | RESPIRATION RATE: 17 BRPM | HEART RATE: 77 BPM | OXYGEN SATURATION: 97 % | TEMPERATURE: 98 F

## 2020-10-23 DIAGNOSIS — D50.9 IRON DEFICIENCY ANEMIA, UNSPECIFIED IRON DEFICIENCY ANEMIA TYPE: ICD-10-CM

## 2020-10-23 DIAGNOSIS — E11.9 TYPE 2 DIABETES MELLITUS WITHOUT COMPLICATION: ICD-10-CM

## 2020-10-23 DIAGNOSIS — Z53.1 REFUSAL OF BLOOD TRANSFUSIONS AS PATIENT IS JEHOVAH'S WITNESS: ICD-10-CM

## 2020-10-23 DIAGNOSIS — D63.1 ANEMIA IN CHRONIC KIDNEY DISEASE, UNSPECIFIED CKD STAGE: Primary | ICD-10-CM

## 2020-10-23 DIAGNOSIS — N18.9 ANEMIA IN CHRONIC KIDNEY DISEASE, UNSPECIFIED CKD STAGE: Primary | ICD-10-CM

## 2020-10-23 PROCEDURE — 63600175 PHARM REV CODE 636 W HCPCS: Mod: HCNC | Performed by: INTERNAL MEDICINE

## 2020-10-23 PROCEDURE — 96372 THER/PROPH/DIAG INJ SC/IM: CPT | Mod: HCNC

## 2020-10-23 RX ADMIN — EPOETIN ALFA-EPBX 20000 UNITS: 10000 INJECTION, SOLUTION INTRAVENOUS; SUBCUTANEOUS at 11:10

## 2020-10-23 NOTE — PLAN OF CARE
Pt arrived to unit. VSS. Pt afebrile. Hgb 10.9. Pt tolerated Retacrit. Pt has next appts. Pt discharged from unit using mobile scooter. No distress noted.

## 2020-10-26 ENCOUNTER — OFFICE VISIT (OUTPATIENT)
Dept: HEMATOLOGY/ONCOLOGY | Facility: CLINIC | Age: 75
End: 2020-10-26
Payer: MEDICARE

## 2020-10-26 VITALS
SYSTOLIC BLOOD PRESSURE: 164 MMHG | HEART RATE: 80 BPM | OXYGEN SATURATION: 98 % | WEIGHT: 124.56 LBS | DIASTOLIC BLOOD PRESSURE: 81 MMHG | BODY MASS INDEX: 21.27 KG/M2 | TEMPERATURE: 98 F | HEIGHT: 64 IN

## 2020-10-26 DIAGNOSIS — N18.9 ANEMIA IN CHRONIC KIDNEY DISEASE, UNSPECIFIED CKD STAGE: Primary | ICD-10-CM

## 2020-10-26 DIAGNOSIS — D63.1 ANEMIA IN CHRONIC KIDNEY DISEASE, UNSPECIFIED CKD STAGE: Primary | ICD-10-CM

## 2020-10-26 DIAGNOSIS — G89.4 CHRONIC PAIN SYNDROME: ICD-10-CM

## 2020-10-26 DIAGNOSIS — N18.9 CHRONIC KIDNEY DISEASE, UNSPECIFIED CKD STAGE: ICD-10-CM

## 2020-10-26 PROCEDURE — 1125F AMNT PAIN NOTED PAIN PRSNT: CPT | Mod: HCNC,S$GLB,, | Performed by: INTERNAL MEDICINE

## 2020-10-26 PROCEDURE — 1101F PR PT FALLS ASSESS DOC 0-1 FALLS W/OUT INJ PAST YR: ICD-10-PCS | Mod: HCNC,CPTII,S$GLB, | Performed by: INTERNAL MEDICINE

## 2020-10-26 PROCEDURE — 3008F PR BODY MASS INDEX (BMI) DOCUMENTED: ICD-10-PCS | Mod: HCNC,CPTII,S$GLB, | Performed by: INTERNAL MEDICINE

## 2020-10-26 PROCEDURE — 1101F PT FALLS ASSESS-DOCD LE1/YR: CPT | Mod: HCNC,CPTII,S$GLB, | Performed by: INTERNAL MEDICINE

## 2020-10-26 PROCEDURE — 3079F PR MOST RECENT DIASTOLIC BLOOD PRESSURE 80-89 MM HG: ICD-10-PCS | Mod: HCNC,CPTII,S$GLB, | Performed by: INTERNAL MEDICINE

## 2020-10-26 PROCEDURE — 3077F SYST BP >= 140 MM HG: CPT | Mod: HCNC,CPTII,S$GLB, | Performed by: INTERNAL MEDICINE

## 2020-10-26 PROCEDURE — 99999 PR PBB SHADOW E&M-EST. PATIENT-LVL V: CPT | Mod: PBBFAC,HCNC,, | Performed by: INTERNAL MEDICINE

## 2020-10-26 PROCEDURE — 3008F BODY MASS INDEX DOCD: CPT | Mod: HCNC,CPTII,S$GLB, | Performed by: INTERNAL MEDICINE

## 2020-10-26 PROCEDURE — 99999 PR PBB SHADOW E&M-EST. PATIENT-LVL V: ICD-10-PCS | Mod: PBBFAC,HCNC,, | Performed by: INTERNAL MEDICINE

## 2020-10-26 PROCEDURE — 99214 PR OFFICE/OUTPT VISIT, EST, LEVL IV, 30-39 MIN: ICD-10-PCS | Mod: HCNC,S$GLB,, | Performed by: INTERNAL MEDICINE

## 2020-10-26 PROCEDURE — 99214 OFFICE O/P EST MOD 30 MIN: CPT | Mod: HCNC,S$GLB,, | Performed by: INTERNAL MEDICINE

## 2020-10-26 PROCEDURE — 1159F PR MEDICATION LIST DOCUMENTED IN MEDICAL RECORD: ICD-10-PCS | Mod: HCNC,S$GLB,, | Performed by: INTERNAL MEDICINE

## 2020-10-26 PROCEDURE — 1125F PR PAIN SEVERITY QUANTIFIED, PAIN PRESENT: ICD-10-PCS | Mod: HCNC,S$GLB,, | Performed by: INTERNAL MEDICINE

## 2020-10-26 PROCEDURE — 3077F PR MOST RECENT SYSTOLIC BLOOD PRESSURE >= 140 MM HG: ICD-10-PCS | Mod: HCNC,CPTII,S$GLB, | Performed by: INTERNAL MEDICINE

## 2020-10-26 PROCEDURE — 3079F DIAST BP 80-89 MM HG: CPT | Mod: HCNC,CPTII,S$GLB, | Performed by: INTERNAL MEDICINE

## 2020-10-26 PROCEDURE — 1159F MED LIST DOCD IN RCRD: CPT | Mod: HCNC,S$GLB,, | Performed by: INTERNAL MEDICINE

## 2020-10-26 NOTE — PROGRESS NOTES
Subjective:        Patient ID: Regino Lawrence is a 75 y.o. female.    Chief Complaint: Follow-up anemia      Diagnosis: Anemia in CKD  Patient is a Faith  .    The patient is seen today via televisit for chronic anemia in CKD. The patient reports that she has been diagnosed with JOLLY in the past.  She has been on oral iron supplementation therapy, but could not tolerate or did not respond, she is uncertain.  She is followed by GI and has undergone a colonoscopy earlier this year and was diagnosed with hemorrhoids for which she underwent banding procedure.  No melena, hematochezia,change in bowel habits.  She has also been diagnosed with B12 deficiency in the past, but reports she did not respond to B12 injections. No history of blood transfusions.  She is a Faith.  She reports that she remembers getting injections in the 1970s when her blood count was low.        She is followed by Rheumatology for history of sarcoidosis with associated myopathy and arthropathy.   .She has been treated in the  past with methotrexate and Plaquenil, both of which were ineffective  Cellcept and imuran caused some unknown side effect.   Colchicine  held due to low WBC     No new issues   She is undergoing epo inj q 2wks  Hb 10.9 g/dL      She continues with Chronic diffuse arthralgias-stable   Followed by pain management  She has also been diagnosed with left sided sciatica  No fevers  Mild fatigue  Mild FAUSTIN-stable  No CP or cough   No melena, hematochezia or change in bowel habits      She was  hospitalized 10/18/2019  She presented with SOB and dyspnea on exertion for the last 2 months.The CTA was negative for PE. She has also seen a cardiologist. Her echo/stress test in 4/2019 were with EF of 55% and no ischemia. She denies any leg swelling associated with this. She was treated for a possible  Sarcoidosis flare-up      She also has  undergone intermittent IV iron therapy    She is followed by pain mgmt for  "chronic hip pain   She was diagnosed with bursitis of hip and  underwent steroid inj  history of cervical spinal stenosis s/p posterior C3-C7 laminectomy and fusion on 11/16/15 by Dr. Conner.      CBC   reveals wbc 5880/mm3  Hb 10.9 g/dl Hct 34.6 % Plt ct 286k      Patient was in the ED 2018 and was seen on   at [facility] for back issues  She is followed by Neurosurgery for cervical spinal stenosis s/p posterior C3-C7 laminectomy and fusion      She is followed by PCP for DM  DM well -controlled  Hba1c  6.0% on 2020         PAST MEDICAL HISTORY:  Acid reflux, alopecia, anemia, anxiety disorder, chronic  kidney disease, depression, diabetes mellitus type 2, hyperlipidemia,  hypertension, hypothyroidism, osteoporosis, sarcoidosis.    PAST SURGICAL HISTORY:  Cholecystectomy, , tubal ligation, carpal tunnel release, cataracts.    FAMILY HISTORY: Unremarkable for cancer. Significant for HTN.       Review of Systems   Constitutional: Positive for fatigue. Negative for activity change and appetite change.   HENT: Negative for hearing loss and nosebleeds.    Eyes: Negative for visual disturbance.   Respiratory: Positive for shortness of breath (Mild FAUSTIN). Negative for cough.    Cardiovascular: Negative for chest pain and leg swelling.   Gastrointestinal: Negative for abdominal pain, constipation, diarrhea and nausea.   Genitourinary: Negative for flank pain and urgency.   Musculoskeletal: Positive for arthralgias and back pain. Negative for gait problem and joint swelling.   Skin: Negative for rash.        No petechiae, ecchymoses   Neurological: Negative for light-headedness and headaches.   Hematological: Negative for adenopathy. Does not bruise/bleed easily.       Objective:       Vitals:    10/26/20 1034   BP: (!) 164/81   Pulse: 80   Temp: 98.1 °F (36.7 °C)   TempSrc: Oral   SpO2: 98%   Weight: 56.5 kg (124 lb 9 oz)   Height: 5' 4" (1.626 m)     Physical Exam   Constitutional: She is oriented to " person, place, and time. She appears well-developed and well-nourished.   HENT:   Head: Normocephalic.   Mouth/Throat: Oropharynx is clear and moist. No oropharyngeal exudate.   Eyes: Conjunctivae and lids are normal. Pupils are equal, round, and reactive to light. No scleral icterus.   Neck: Normal range of motion. Neck supple. No thyromegaly present.   Cardiovascular: Normal rate, regular rhythm and normal heart sounds.    No murmur heard.  Pulmonary/Chest: Breath sounds normal. She has no wheezes. She has no rales.   Abdominal: Soft. Bowel sounds are normal. She exhibits no distension and no mass. There is no hepatosplenomegaly. There is no tenderness. There is no rebound and no guarding.   Musculoskeletal: Ambulates w/assistance   Neurological: She is alert and oriented to person, place, and time. No cranial nerve deficit.  Skin: Skin is warm and dry. No ecchymosis, no petechiae and no rash noted. No erythema.   Psychiatric: She has a normal mood and affect.               Lab Results   Component Value Date    WBC 5.88 10/23/2020    HGB 10.9 (L) 10/23/2020    HCT 34.6 (L) 10/23/2020     (H) 10/23/2020     10/23/2020     Lab Results   Component Value Date    IRON 92 10/23/2020    TIBC 397 10/23/2020    FERRITIN 225 10/23/2020     SPEP-nl      CT renal 3/6/2017   IMPRESSION:  1.  No renal, ureteral or bladder calculi.  No hydronephrosis or ureterectasis.  2.  Poorly distended bladder.  Mild bladder wall prominence.  Mild cystitis cannot be   excluded.  3.  Moderate constipation.  Normal appendix      Lab Results   Component Value Date    IRON 92 10/23/2020    TIBC 397 10/23/2020    FERRITIN 225 10/23/2020     Lab Results   Component Value Date    WBC 5.88 10/23/2020    HGB 10.9 (L) 10/23/2020    HCT 34.6 (L) 10/23/2020     (H) 10/23/2020     10/23/2020         Assessment:       1. Anemia in chronic kidney disease, unspecified CKD stage    2. Chronic kidney disease, unspecified CKD stage     3. Patient is Moravian    4. Chronic pain syndrome        Plan:   1-3   Pt clinically stable  Pt is a Mosque and declines/not interested in blood and blood products due to Adventism beliefs  CBC   reveals wbc 5880/mm3  Hb 10.9 g/dl Hct 34.6 % Plt ct 286k    Ferritin 225  Fe sats 23    Pt followed by Nephrology . It has been determined early CKD stage III, suspect due to age-related renal nephron loss, along with possible diabetic nephropathy v. hypertensive nephrosclerosis    CBC q2wks STANDING  Cont EPO  inj q 2wks( pending lab parameters)    Continue periodic monitoring of Fe studies    4. Followed by pain mgmt    Follow-up with PCP for med mgmt    F/u 2 mos with Fe studies    Advance Care Planning     Power of   I previously initiated the process of advance care planning today and explained the importance of this process to the patient.  I introduced the concept of advance directives to the patient, as well. Then the patient received detailed information about the importance of designating a Health Care Power of  (HCPOA). She was also instructed to communicate with this person about their wishes for future healthcare, should she become sick and lose decision-making capacity. The patient has  previously appointed a HCPOA. Pt completed in 2015           CC: Micaela Mendoza M.D.

## 2020-11-02 ENCOUNTER — OFFICE VISIT (OUTPATIENT)
Dept: PAIN MEDICINE | Facility: CLINIC | Age: 75
End: 2020-11-02
Payer: MEDICARE

## 2020-11-02 VITALS
WEIGHT: 122.38 LBS | BODY MASS INDEX: 20.89 KG/M2 | TEMPERATURE: 99 F | HEIGHT: 64 IN | SYSTOLIC BLOOD PRESSURE: 124 MMHG | HEART RATE: 87 BPM | DIASTOLIC BLOOD PRESSURE: 74 MMHG

## 2020-11-02 DIAGNOSIS — M16.12 PRIMARY OSTEOARTHRITIS OF LEFT HIP: ICD-10-CM

## 2020-11-02 DIAGNOSIS — M70.62 GREATER TROCHANTERIC BURSITIS OF LEFT HIP: ICD-10-CM

## 2020-11-02 DIAGNOSIS — M54.16 LUMBAR RADICULOPATHY: ICD-10-CM

## 2020-11-02 DIAGNOSIS — G89.29 CHRONIC LEFT HIP PAIN: Primary | ICD-10-CM

## 2020-11-02 DIAGNOSIS — M47.816 LUMBAR SPONDYLOSIS: ICD-10-CM

## 2020-11-02 DIAGNOSIS — M51.36 DDD (DEGENERATIVE DISC DISEASE), LUMBAR: ICD-10-CM

## 2020-11-02 DIAGNOSIS — M25.552 CHRONIC LEFT HIP PAIN: Primary | ICD-10-CM

## 2020-11-02 DIAGNOSIS — M53.3 SACROILIAC JOINT PAIN: ICD-10-CM

## 2020-11-02 PROCEDURE — 99213 PR OFFICE/OUTPT VISIT, EST, LEVL III, 20-29 MIN: ICD-10-PCS | Mod: HCNC,S$GLB,, | Performed by: NURSE PRACTITIONER

## 2020-11-02 PROCEDURE — 99999 PR PBB SHADOW E&M-EST. PATIENT-LVL IV: CPT | Mod: PBBFAC,HCNC,, | Performed by: NURSE PRACTITIONER

## 2020-11-02 PROCEDURE — 3074F SYST BP LT 130 MM HG: CPT | Mod: HCNC,CPTII,S$GLB, | Performed by: NURSE PRACTITIONER

## 2020-11-02 PROCEDURE — 1101F PT FALLS ASSESS-DOCD LE1/YR: CPT | Mod: HCNC,CPTII,S$GLB, | Performed by: NURSE PRACTITIONER

## 2020-11-02 PROCEDURE — 1159F MED LIST DOCD IN RCRD: CPT | Mod: HCNC,S$GLB,, | Performed by: NURSE PRACTITIONER

## 2020-11-02 PROCEDURE — 3074F PR MOST RECENT SYSTOLIC BLOOD PRESSURE < 130 MM HG: ICD-10-PCS | Mod: HCNC,CPTII,S$GLB, | Performed by: NURSE PRACTITIONER

## 2020-11-02 PROCEDURE — 99999 PR PBB SHADOW E&M-EST. PATIENT-LVL IV: ICD-10-PCS | Mod: PBBFAC,HCNC,, | Performed by: NURSE PRACTITIONER

## 2020-11-02 PROCEDURE — 99213 OFFICE O/P EST LOW 20 MIN: CPT | Mod: HCNC,S$GLB,, | Performed by: NURSE PRACTITIONER

## 2020-11-02 PROCEDURE — 1125F PR PAIN SEVERITY QUANTIFIED, PAIN PRESENT: ICD-10-PCS | Mod: HCNC,S$GLB,, | Performed by: NURSE PRACTITIONER

## 2020-11-02 PROCEDURE — 1101F PR PT FALLS ASSESS DOC 0-1 FALLS W/OUT INJ PAST YR: ICD-10-PCS | Mod: HCNC,CPTII,S$GLB, | Performed by: NURSE PRACTITIONER

## 2020-11-02 PROCEDURE — 1159F PR MEDICATION LIST DOCUMENTED IN MEDICAL RECORD: ICD-10-PCS | Mod: HCNC,S$GLB,, | Performed by: NURSE PRACTITIONER

## 2020-11-02 PROCEDURE — 3078F PR MOST RECENT DIASTOLIC BLOOD PRESSURE < 80 MM HG: ICD-10-PCS | Mod: HCNC,CPTII,S$GLB, | Performed by: NURSE PRACTITIONER

## 2020-11-02 PROCEDURE — 1125F AMNT PAIN NOTED PAIN PRSNT: CPT | Mod: HCNC,S$GLB,, | Performed by: NURSE PRACTITIONER

## 2020-11-02 PROCEDURE — 3078F DIAST BP <80 MM HG: CPT | Mod: HCNC,CPTII,S$GLB, | Performed by: NURSE PRACTITIONER

## 2020-11-02 NOTE — H&P (VIEW-ONLY)
Chronic patient Established Note (Follow up visit)      SUBJECTIVE:    Interval History 11/2/2020:  The patient returns to clinic today for follow up of hip pain. She reports increased left hip pain. She describes this pain as sharp and stabbing in nature. She denies any radicular leg pain. Her pain is worse with standing and walking. She continues to take Gabapentin, Zanaflex, and Norco per her PCP. She denies any other health changes. Her pain today is 7/10.    Interval History 9/17/2020:  The patient returns to clinic today for follow up of low back pain. She is s/p left SI joint injection on 9/3/2020. She reports 30-40% relief of her left hip and buttock pain. She has been able to sit for longer periods of time without increased pain. She denies any radiating leg pain today. She continues to take Gabapentin, Zanaflex, and Norco per her PCP. She denies any other health changes. Her pain today is 7/10.    Interval History 8/11/2020:  The patient returns to clinic today for follow up of low back pain. She is s/p left hip and GTB injection on 7/27/2020. She reports limited relief of her pain. She continues to report left sided hip and buttock pain. She reports intermittent radiating pain down the lateral aspect of her left leg to her ankle. She describes this pain as sharp in nature. She denies any radiating leg pain. She continues to take Gabapentin, Zanaflex, and Norco per PCP. She is performing some home stretches. She denies any other health changes. Her pain today is 10/10.    Interval History 7/14/2020:  Regino Lawrence presents to the clinic for a follow-up appointment for low back and hip pain. She is s/p bilateral L5/S1 TF RHIANNA on 6/29/2020. She reports 100% relief of her low back pain. She reports increased left hip pain. She describes this pain as constant, sharp, and stabbing in nature. Her pain is worse with sitting, walking, and laying on her left side. She denies any radicular leg pain. She continues  to take Gabapentin, Zanaflex, and Norco per her PCP. She denies any other health changes. Her pain today is 10/10.     Pain Disability Index Review:  Last 3 PDI Scores 11/2/2020 9/17/2020 8/11/2020   Pain Disability Index (PDI) 70 41 66       Pain Medications:  Gabapentin  Zanaflex  Norco    Opioid Contract: yes- with PCP     report:  Reviewed and consistent with medication use as prescribed.    Pain Procedures:   10/8/2018- Left L5 TF RHIANNA  12/6/2018- Bilateral L5 TF RHIANNA  3/21/2019- Left hip and GTB injection  7/22/2019- Left hip joint injection  9/12/2019- Left hip and GTB injection   12/15/2019- Bilateral L5 TF RHIANNA  6/29/2020- Bilateral L5/S1 TF RHIANNA  7/27/2020- Left hip and GTB injections  9/3/2020- Left SI joint injection     Physical Therapy/Home Exercise: no    Imaging:   MRI Lumbar Spine 9/12/2018:  COMPARISON:  None.     FINDINGS:  The distal cord/conus demonstrates normal size and signal.  No evidence of osteomyelitis, marrow replacement process, or acute fracture.  No paraspinal masses.     At L1-2, there is asymmetric disc bulging to the right, resulting in mild right-sided neural foraminal narrowing.  No spinal canal stenosis.     L1-2 is unremarkable.     L2-3 demonstrates mild disc bulging slightly asymmetric to the left resulting in mild left-sided neural foraminal narrowing.  No spinal canal stenosis.     L4-5 demonstrates minimal anterolisthesis anterolisthesis.  There is mild facet arthropathy and disc bulging.  This results in mild left-sided neural foraminal narrowing.  No spinal canal stenosis.     At L5-S1, there is a moderate sized left lateral recess/foraminal disc extrusion effacing the left neural foramen, likely impinging upon the exiting left L5 nerve root.  There is left-sided degenerative disc disease, noting disc height loss and sub endplate marrow edema.  There is moderate bilateral facet arthropathy.  No spinal canal stenosis.     IMPRESSION:      Moderate size left foraminal disc  extrusion at L5-S1, likely impinging upon the exiting left L5 nerve root.     Additional lumbar spondylosis, resulting in mild neural foraminal narrowing at L1-2, L2-3, and L4-5, as above.  No spinal canal stenosis.    Allergies:   Review of patient's allergies indicates:   Allergen Reactions    Azathioprine Shortness Of Breath and Other (See Comments)     Fatigue       Current Medications:   Current Outpatient Medications   Medication Sig Dispense Refill    ACCU-CHEK FASTCLIX LANCING DEV Kit USE AS DIRECTED. 1 each 0    ALCOHOL ANTISEPTIC PADS (ALCOHOL PREP PADS TOP)       atorvastatin (LIPITOR) 20 MG tablet Take 1 tablet (20 mg total) by mouth once daily. 90 tablet 3    blood sugar diagnostic (ACCU-CHEK SMARTVIEW TEST STRIP) Strp Use as directed to check blood sugar twice daily. 200 strip 3    blood-glucose meter kit Use as instructed 1 each 0    carvediloL (COREG) 25 MG tablet Take 1 tablet (25 mg total) by mouth 2 (two) times daily. 180 tablet 1    cloNIDine (CATAPRES) 0.3 MG tablet Take 1 tablet (0.3 mg total) by mouth 3 (three) times daily. 270 tablet 1    diaper,brief,adult,disposable Misc       fenofibrate 160 MG Tab Take 1 tablet by mouth once daily 90 tablet 3    FLUZONE HIGH-DOSE 2019-20, PF, 180 mcg/0.5 mL Syrg PHARMACIST ADMINISTERED IMMUNIZATION ADMINISTERED AT TIME OF DISPENSING  0    FLUZONE HIGHDOSE QUAD 20-21  mcg/0.7 mL Syrg PHARMACIST ADMINISTERED IMMUNIZATION ADMINISTERED AT TIME OF DISPENSING      folic acid (FOLVITE) 1 MG tablet Take 1 tablet (1,000 mcg total) by mouth once daily. 90 tablet 0    gabapentin (NEURONTIN) 400 MG capsule Take 1 capsule (400 mg total) by mouth 3 (three) times daily. 90 capsule 0    HYDROcodone-acetaminophen (NORCO) 5-325 mg per tablet Take 1 tablet by mouth every 6 (six) hours as needed. 90 tablet 0    HYDROcodone-acetaminophen (NORCO) 5-325 mg per tablet Take 1 tablet by mouth every 6 (six) hours as needed. 90 tablet 0    [START ON  11/14/2020] HYDROcodone-acetaminophen (NORCO) 5-325 mg per tablet Take 1 tablet by mouth every 6 (six) hours as needed. 90 tablet 0    [START ON 12/14/2020] HYDROcodone-acetaminophen (NORCO) 5-325 mg per tablet Take 1 tablet by mouth every 6 (six) hours as needed. 90 tablet 0    levothyroxine (EUTHYROX) 50 MCG tablet TAKE 1 TABLET BY MOUTH ONCE DAILY BEFORE BREAKFAST 90 tablet 1    metFORMIN (GLUCOPHAGE) 1000 MG tablet Take 1 tablet (1,000 mg total) by mouth 2 (two) times daily with meals. 180 tablet 1    NIFEdipine (PROCARDIA-XL) 30 MG (OSM) 24 hr tablet Take 1 tablet by mouth once daily 90 tablet 1    predniSONE (DELTASONE) 5 MG tablet Take 1.5 tablets (7.5 mg total) by mouth once daily. (Patient taking differently: Take 5 mg by mouth once daily. ) 135 tablet 1    tiZANidine (ZANAFLEX) 2 MG tablet Take 2 tablets (4 mg total) by mouth every 8 (eight) hours as needed. 180 tablet 0    calcium carbonate (OS-MALCOLM) 600 mg calcium (1,500 mg) Tab Take 1 tablet by mouth 2 (two) times daily.       epoetin german-epbx (RETACRIT) 10,000 unit/mL imjection Inject 20,000 Units into the skin every 14 (fourteen) days.      oxybutynin (DITROPAN-XL) 5 MG TR24 Take 1 tablet (5 mg total) by mouth once daily. (Patient not taking: Reported on 10/14/2020) 30 tablet 11     No current facility-administered medications for this visit.      Facility-Administered Medications Ordered in Other Visits   Medication Dose Route Frequency Provider Last Rate Last Dose    0.9%  NaCl infusion  500 mL Intravenous Continuous Ever Resendez MD           REVIEW OF SYSTEMS:    GENERAL:  No weight loss, malaise or fevers.  HEENT:  Negative for frequent or significant headaches.  NECK:  Negative for lumps, goiter, pain and significant neck swelling.  RESPIRATORY:  Negative for cough, wheezing or shortness of breath.  CARDIOVASCULAR:  Negative for chest pain, leg swelling or palpitations. HTN  GI:  Negative for abdominal discomfort, blood in stools or  black stools or change in bowel habits.  RENAL: CKD  MUSCULOSKELETAL:  See HPI.  SKIN:  Negative for lesions, rash, and itching.  PSYCH:  Negative for sleep disturbance, mood disorder and recent psychosocial stressors.  HEMATOLOGY/LYMPHOLOGY:  Negative for prolonged bleeding, bruising easily or swollen nodes.  NEURO:   No history of headaches, syncope, paralysis, seizures or tremors.  ENDO: Diabetes, thyroid disorder.   All other reviewed and negative other than HPI.    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, without long-term current use of insulin     Depression     Diabetes mellitus, type 2     Eye injury as a child     k-abrasion  od    Hyperlipidemia     Hypertension     Hypothyroidism     Immune deficiency disorder     Immune disorder     Myalgia and myositis 2012    Osteoporosis     Polyneuropathy     Renal manifestation of secondary diabetes mellitus     Sarcoidosis     Type 2 diabetes mellitus     Ulcer     no cancer    Urinary incontinence        Past Surgical History:  Past Surgical History:   Procedure Laterality Date    CARPAL TUNNEL RELEASE      Rt wrist    CATARACT EXTRACTION W/  INTRAOCULAR LENS IMPLANT Right 2015    Dr. Azevedo    CATARACT EXTRACTION W/  INTRAOCULAR LENS IMPLANT Left 2015    Dr. Azevedo     SECTION      CHOLECYSTECTOMY      INJECTION OF JOINT Left 3/21/2019    Procedure: Injection, Joint  fLUOROSCOPIC jOINT iNJECTION (hIP iNJECTION) LEFT ROCH BURSA AS WELL LEFT TROCHANTERIC BURSA;  Surgeon: Alfonso Richards MD;  Location: [facility]IN [facility];  Service: Pain Management;  Laterality: Left;  NEEDS CONSENT,  DIABETIC    INJECTION OF JOINT Left 7/22/2019    Procedure: Injection, Joint FLUOROSCOPIC JOINT INJECTION (HIP INJECTION) LEFT HIP;  Surgeon: Alfonso Richards MD;  Location: [facility] PAIN [facility]GT;  Service: Pain Management;  Laterality: Left;  NEEDS CONSENT    INJECTION OF JOINT Left 9/12/2019    Procedure: INJECTION, JOINT;  Surgeon: Alfonso Richards MD;  Location: [facility] PAIN [facility]GT;  Service: Pain Management;  Laterality: Left;  Left Hip and Left GTB Injections    INJECTION OF JOINT Left 7/27/2020    Procedure: INJECTION, JOINT, LEFT HIP and LEFT GREATER TROCHANTERIC BURSA;  Surgeon: Alfonso Richards MD;  Location: [facility] PAIN [facility]GT;  Service: Pain Management;  Laterality: Left;  INJECTION, JOINT, LEFT HIP and LEFT GREATER TROCHANTERIC BURSA    INJECTION OF JOINT Left 9/3/2020    Procedure: INJECTION, JOINT, LEFT SI;  Surgeon: Alfonso Richards MD;  Location: [facility] PAIN [facility]GT;  Service: Pain Management;  Laterality: Left;  INJECTION, JOINT, LEFT SI    TRANSFORAMINAL EPIDURAL INJECTION OF STEROID Bilateral 12/5/2019    Procedure: INJECTION, STEROID, EPIDURAL, TRANSFORAMINAL APPROACH;  Surgeon: Alfonso Richards MD;  Location: [facility] PAIN [facility];  Service: Pain Management;  Laterality: Bilateral;  B/L TF RHIANNA L5  Consent Needed    TRANSFORAMINAL EPIDURAL INJECTION OF STEROID Bilateral 6/29/2020    Procedure: INJECTION, STEROID, EPIDURAL, TRANSFORAMINAL APPROACH L5/S1;  Surgeon: Alfonso Richards MD;  Location: [facility]IN [facility];  Service: Pain Management;  Laterality: Bilateral;  B/L TF RHIANNA L5/S1    TUBAL LIGATION         Family History:  Family History   Problem Relation Age of Onset    Hypertension Mother     Cataracts Mother     No Known Problems Father     Hypertension Maternal Grandmother     Glaucoma Sister     Arthritis Sister     No Known Problems Brother     No Known Problems Maternal Aunt     No Known Problems Maternal Uncle     No Known Problems Paternal Aunt     No Known Problems Paternal Uncle     No Known Problems Maternal Grandfather   "   No Known Problems Paternal Grandmother     No Known Problems Paternal Grandfather     Kidney failure Sister     Hepatitis Sister     Cancer Sister         bone cancer     Immunodeficiency Sister     Diabetes Son     Hypertension Son     Lupus Neg Hx     Rheum arthritis Neg Hx     Amblyopia Neg Hx     Blindness Neg Hx     Macular degeneration Neg Hx     Retinal detachment Neg Hx     Strabismus Neg Hx     Stroke Neg Hx     Thyroid disease Neg Hx     Endometrial cancer Neg Hx     Vaginal cancer Neg Hx     Cervical cancer Neg Hx        Social History:  Social History     Socioeconomic History    Marital status:      Spouse name: Gasper     Number of children: 5    Years of education: Business school after high school    Highest education level: 12th grade   Occupational History    Not on file   Social Needs    Financial resource strain: Not very hard    Food insecurity     Worry: Never true     Inability: Never true    Transportation needs     Medical: No     Non-medical: No   Tobacco Use    Smoking status: Never Smoker    Smokeless tobacco: Never Used   Substance and Sexual Activity    Alcohol use: No    Drug use: No    Sexual activity: Not Currently     Partners: Male   Lifestyle    Physical activity     Days per week: 0 days     Minutes per session: 0 min    Stress: Not at all   Relationships    Social connections     Talks on phone: Once a week     Gets together: Once a week     Attends [employer] service: Never     Active member of club or organization: No     Attends meetings of clubs or organizations: Never     Relationship status:    Other Topics Concern    Not on file   Social History Narrative    Not on file       OBJECTIVE:    /74   Pulse 87   Temp 98.6 °F (37 °C)   Ht 5' 4" (1.626 m)   Wt 55.5 kg (122 lb 5.7 oz)   LMP  (LMP Unknown)   BMI 21.00 kg/m²     PHYSICAL EXAMINATION:    General appearance: Well appearing, in no acute distress, alert " and oriented x3.  Psych:  Mood and affect appropriate.  Skin: Skin color, texture, turgor normal, no rashes or lesions, in both upper and lower body.  Head/face:  Atraumatic, normocephalic. No palpable lymph nodes  Cor: RRR  Pulm: CTA  GI: Abdomen soft and non-tender.  Back: Straight leg raising in the sitting position is negative to radicular pain bilaterally. There is mild pain with palpation over lumbar facet joints. Limited ROM with mild pain on extension. Positive facet loading bilaterally.   Extremities: Peripheral joint ROM is full and pain free without obvious instability or laxity in all four extremities. No deformities, edema, or skin discoloration. Good capillary refill.  Musculoskeletal: Shoulder and knee provocative maneuvers are negative. There is pain with internal and external rotation of left hip. There is mild pain with palpation over left SI joint. FABERs is positive on the left. There is mild pain with palpation over left GTB. Bilateral lower extremity strength is normal and symmetric.  No atrophy or tone abnormalities are noted.  Neuro: Bilateral lower extremity coordination and muscle stretch reflexes are physiologic and symmetric.  Plantar response are downgoing. No loss of sensation is noted.  Gait: Antalgic- ambulates with electric scooter.     ASSESSMENT: 75 y.o. year old female with low back and hip pain, consistent with the followin. Chronic left hip pain     2. Primary osteoarthritis of left hip     3. Greater trochanteric bursitis of left hip     4. Sacroiliac joint pain     5. Lumbar spondylosis     6. Lumbar radiculopathy     7. DDD (degenerative disc disease), lumbar           PLAN:     - Previous imaging was reviewed and discussed with the patient today.    - Schedule for left femoral and obturator nerve block.     - If significant short term relief, we can consider RFA.     - She is s/p bilateral L5/S1 TF RHIANNA with benefit. We can repeat this as needed.     - Continue  current medications.     - I have stressed the importance of physical activity and a home exercise plan to help with pain and improve health.    - RTC PRN. She will call with pain diary results.     - Counseled patient regarding the importance of activity modification and constant sleeping habits.    - Dr. Richards was consulted on the patient and agrees with this plan.    The above plan and management options were discussed at length with patient. Patient is in agreement with the above and verbalized understanding.    Jenny Balbuena NP  11/02/2020

## 2020-11-02 NOTE — PROGRESS NOTES
Chronic patient Established Note (Follow up visit)      SUBJECTIVE:    Interval History 11/2/2020:  The patient returns to clinic today for follow up of hip pain. She reports increased left hip pain. She describes this pain as sharp and stabbing in nature. She denies any radicular leg pain. Her pain is worse with standing and walking. She continues to take Gabapentin, Zanaflex, and Norco per her PCP. She denies any other health changes. Her pain today is 7/10.    Interval History 9/17/2020:  The patient returns to clinic today for follow up of low back pain. She is s/p left SI joint injection on 9/3/2020. She reports 30-40% relief of her left hip and buttock pain. She has been able to sit for longer periods of time without increased pain. She denies any radiating leg pain today. She continues to take Gabapentin, Zanaflex, and Norco per her PCP. She denies any other health changes. Her pain today is 7/10.    Interval History 8/11/2020:  The patient returns to clinic today for follow up of low back pain. She is s/p left hip and GTB injection on 7/27/2020. She reports limited relief of her pain. She continues to report left sided hip and buttock pain. She reports intermittent radiating pain down the lateral aspect of her left leg to her ankle. She describes this pain as sharp in nature. She denies any radiating leg pain. She continues to take Gabapentin, Zanaflex, and Norco per PCP. She is performing some home stretches. She denies any other health changes. Her pain today is 10/10.    Interval History 7/14/2020:  Regino Lawrence presents to the clinic for a follow-up appointment for low back and hip pain. She is s/p bilateral L5/S1 TF RHIANNA on 6/29/2020. She reports 100% relief of her low back pain. She reports increased left hip pain. She describes this pain as constant, sharp, and stabbing in nature. Her pain is worse with sitting, walking, and laying on her left side. She denies any radicular leg pain. She continues  to take Gabapentin, Zanaflex, and Norco per her PCP. She denies any other health changes. Her pain today is 10/10.     Pain Disability Index Review:  Last 3 PDI Scores 11/2/2020 9/17/2020 8/11/2020   Pain Disability Index (PDI) 70 41 66       Pain Medications:  Gabapentin  Zanaflex  Norco    Opioid Contract: yes- with PCP     report:  Reviewed and consistent with medication use as prescribed.    Pain Procedures:   10/8/2018- Left L5 TF RHIANNA  12/6/2018- Bilateral L5 TF RHIANNA  3/21/2019- Left hip and GTB injection  7/22/2019- Left hip joint injection  9/12/2019- Left hip and GTB injection   12/15/2019- Bilateral L5 TF RHIANNA  6/29/2020- Bilateral L5/S1 TF RHIANNA  7/27/2020- Left hip and GTB injections  9/3/2020- Left SI joint injection     Physical Therapy/Home Exercise: no    Imaging:   MRI Lumbar Spine 9/12/2018:  COMPARISON:  None.     FINDINGS:  The distal cord/conus demonstrates normal size and signal.  No evidence of osteomyelitis, marrow replacement process, or acute fracture.  No paraspinal masses.     At L1-2, there is asymmetric disc bulging to the right, resulting in mild right-sided neural foraminal narrowing.  No spinal canal stenosis.     L1-2 is unremarkable.     L2-3 demonstrates mild disc bulging slightly asymmetric to the left resulting in mild left-sided neural foraminal narrowing.  No spinal canal stenosis.     L4-5 demonstrates minimal anterolisthesis anterolisthesis.  There is mild facet arthropathy and disc bulging.  This results in mild left-sided neural foraminal narrowing.  No spinal canal stenosis.     At L5-S1, there is a moderate sized left lateral recess/foraminal disc extrusion effacing the left neural foramen, likely impinging upon the exiting left L5 nerve root.  There is left-sided degenerative disc disease, noting disc height loss and sub endplate marrow edema.  There is moderate bilateral facet arthropathy.  No spinal canal stenosis.     IMPRESSION:      Moderate size left foraminal disc  extrusion at L5-S1, likely impinging upon the exiting left L5 nerve root.     Additional lumbar spondylosis, resulting in mild neural foraminal narrowing at L1-2, L2-3, and L4-5, as above.  No spinal canal stenosis.    Allergies:   Review of patient's allergies indicates:   Allergen Reactions    Azathioprine Shortness Of Breath and Other (See Comments)     Fatigue       Current Medications:   Current Outpatient Medications   Medication Sig Dispense Refill    ACCU-CHEK FASTCLIX LANCING DEV Kit USE AS DIRECTED. 1 each 0    ALCOHOL ANTISEPTIC PADS (ALCOHOL PREP PADS TOP)       atorvastatin (LIPITOR) 20 MG tablet Take 1 tablet (20 mg total) by mouth once daily. 90 tablet 3    blood sugar diagnostic (ACCU-CHEK SMARTVIEW TEST STRIP) Strp Use as directed to check blood sugar twice daily. 200 strip 3    blood-glucose meter kit Use as instructed 1 each 0    carvediloL (COREG) 25 MG tablet Take 1 tablet (25 mg total) by mouth 2 (two) times daily. 180 tablet 1    cloNIDine (CATAPRES) 0.3 MG tablet Take 1 tablet (0.3 mg total) by mouth 3 (three) times daily. 270 tablet 1    diaper,brief,adult,disposable Misc       fenofibrate 160 MG Tab Take 1 tablet by mouth once daily 90 tablet 3    FLUZONE HIGH-DOSE 2019-20, PF, 180 mcg/0.5 mL Syrg PHARMACIST ADMINISTERED IMMUNIZATION ADMINISTERED AT TIME OF DISPENSING  0    FLUZONE HIGHDOSE QUAD 20-21  mcg/0.7 mL Syrg PHARMACIST ADMINISTERED IMMUNIZATION ADMINISTERED AT TIME OF DISPENSING      folic acid (FOLVITE) 1 MG tablet Take 1 tablet (1,000 mcg total) by mouth once daily. 90 tablet 0    gabapentin (NEURONTIN) 400 MG capsule Take 1 capsule (400 mg total) by mouth 3 (three) times daily. 90 capsule 0    HYDROcodone-acetaminophen (NORCO) 5-325 mg per tablet Take 1 tablet by mouth every 6 (six) hours as needed. 90 tablet 0    HYDROcodone-acetaminophen (NORCO) 5-325 mg per tablet Take 1 tablet by mouth every 6 (six) hours as needed. 90 tablet 0    [START ON  11/14/2020] HYDROcodone-acetaminophen (NORCO) 5-325 mg per tablet Take 1 tablet by mouth every 6 (six) hours as needed. 90 tablet 0    [START ON 12/14/2020] HYDROcodone-acetaminophen (NORCO) 5-325 mg per tablet Take 1 tablet by mouth every 6 (six) hours as needed. 90 tablet 0    levothyroxine (EUTHYROX) 50 MCG tablet TAKE 1 TABLET BY MOUTH ONCE DAILY BEFORE BREAKFAST 90 tablet 1    metFORMIN (GLUCOPHAGE) 1000 MG tablet Take 1 tablet (1,000 mg total) by mouth 2 (two) times daily with meals. 180 tablet 1    NIFEdipine (PROCARDIA-XL) 30 MG (OSM) 24 hr tablet Take 1 tablet by mouth once daily 90 tablet 1    predniSONE (DELTASONE) 5 MG tablet Take 1.5 tablets (7.5 mg total) by mouth once daily. (Patient taking differently: Take 5 mg by mouth once daily. ) 135 tablet 1    tiZANidine (ZANAFLEX) 2 MG tablet Take 2 tablets (4 mg total) by mouth every 8 (eight) hours as needed. 180 tablet 0    calcium carbonate (OS-MALCOLM) 600 mg calcium (1,500 mg) Tab Take 1 tablet by mouth 2 (two) times daily.       epoetin german-epbx (RETACRIT) 10,000 unit/mL imjection Inject 20,000 Units into the skin every 14 (fourteen) days.      oxybutynin (DITROPAN-XL) 5 MG TR24 Take 1 tablet (5 mg total) by mouth once daily. (Patient not taking: Reported on 10/14/2020) 30 tablet 11     No current facility-administered medications for this visit.      Facility-Administered Medications Ordered in Other Visits   Medication Dose Route Frequency Provider Last Rate Last Dose    0.9%  NaCl infusion  500 mL Intravenous Continuous Ever Resendez MD           REVIEW OF SYSTEMS:    GENERAL:  No weight loss, malaise or fevers.  HEENT:  Negative for frequent or significant headaches.  NECK:  Negative for lumps, goiter, pain and significant neck swelling.  RESPIRATORY:  Negative for cough, wheezing or shortness of breath.  CARDIOVASCULAR:  Negative for chest pain, leg swelling or palpitations. HTN  GI:  Negative for abdominal discomfort, blood in stools or  black stools or change in bowel habits.  RENAL: CKD  MUSCULOSKELETAL:  See HPI.  SKIN:  Negative for lesions, rash, and itching.  PSYCH:  Negative for sleep disturbance, mood disorder and recent psychosocial stressors.  HEMATOLOGY/LYMPHOLOGY:  Negative for prolonged bleeding, bruising easily or swollen nodes.  NEURO:   No history of headaches, syncope, paralysis, seizures or tremors.  ENDO: Diabetes, thyroid disorder.   All other reviewed and negative other than HPI.    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, without long-term current use of insulin     Depression     Diabetes mellitus, type 2     Eye injury as a child     k-abrasion  od    Hyperlipidemia     Hypertension     Hypothyroidism     Immune deficiency disorder     Immune disorder     Myalgia and myositis 2012    Osteoporosis     Polyneuropathy     Renal manifestation of secondary diabetes mellitus     Sarcoidosis     Type 2 diabetes mellitus     Ulcer     no cancer    Urinary incontinence        Past Surgical History:  Past Surgical History:   Procedure Laterality Date    CARPAL TUNNEL RELEASE      Rt wrist    CATARACT EXTRACTION W/  INTRAOCULAR LENS IMPLANT Right 2015    Dr. Azevedo    CATARACT EXTRACTION W/  INTRAOCULAR LENS IMPLANT Left 2015    Dr. Azevedo     SECTION      CHOLECYSTECTOMY      INJECTION OF JOINT Left 3/21/2019    Procedure: Injection, Joint  fLUOROSCOPIC jOINT iNJECTION (hIP iNJECTION) LEFT ROCH BURSA AS WELL LEFT TROCHANTERIC BURSA;  Surgeon: Alfonso Richards MD;  Location: [facility];  Service: Pain Management;  Laterality: Left;  NEEDS CONSENT,  DIABETIC    INJECTION OF JOINT Left 7/22/2019    Procedure: Injection, Joint FLUOROSCOPIC JOINT INJECTION (HIP INJECTION) LEFT HIP;  Surgeon: Alfonso Richards MD;  Location: [facility]IN [facility]GT;  Service: Pain Management;  Laterality: Left;  NEEDS CONSENT    INJECTION OF JOINT Left 9/12/2019    Procedure: INJECTION, JOINT;  Surgeon: Alfonso Richards MD;  Location: [facility] PAIN [facility]GT;  Service: Pain Management;  Laterality: Left;  Left Hip and Left GTB Injections    INJECTION OF JOINT Left 7/27/2020    Procedure: INJECTION, JOINT, LEFT HIP and LEFT GREATER TROCHANTERIC BURSA;  Surgeon: Alfonso Richards MD;  Location: [facility]IN [facility];  Service: Pain Management;  Laterality: Left;  INJECTION, JOINT, LEFT HIP and LEFT GREATER TROCHANTERIC BURSA    INJECTION OF JOINT Left 9/3/2020    Procedure: INJECTION, JOINT, LEFT SI;  Surgeon: Alfonso Richards MD;  Location: [facility]IN [facility];  Service: Pain Management;  Laterality: Left;  INJECTION, JOINT, LEFT SI    TRANSFORAMINAL EPIDURAL INJECTION OF STEROID Bilateral 12/5/2019    Procedure: INJECTION, STEROID, EPIDURAL, TRANSFORAMINAL APPROACH;  Surgeon: Alfonso Richards MD;  Location: [facility]IN [facility];  Service: Pain Management;  Laterality: Bilateral;  B/L TF RHIANNA L5  Consent Needed    TRANSFORAMINAL EPIDURAL INJECTION OF STEROID Bilateral 6/29/2020    Procedure: INJECTION, STEROID, EPIDURAL, TRANSFORAMINAL APPROACH L5/S1;  Surgeon: Alfonso Richards MD;  Location: [facility]IN [facility];  Service: Pain Management;  Laterality: Bilateral;  B/L TF RHIANNA L5/S1    TUBAL LIGATION         Family History:  Family History   Problem Relation Age of Onset    Hypertension Mother     Cataracts Mother     No Known Problems Father     Hypertension Maternal Grandmother     Glaucoma Sister     Arthritis Sister     No Known Problems Brother     No Known Problems Maternal Aunt     No Known Problems Maternal Uncle     No Known Problems Paternal Aunt     No Known Problems Paternal Uncle     No Known Problems Maternal Grandfather   "   No Known Problems Paternal Grandmother     No Known Problems Paternal Grandfather     Kidney failure Sister     Hepatitis Sister     Cancer Sister         bone cancer     Immunodeficiency Sister     Diabetes Son     Hypertension Son     Lupus Neg Hx     Rheum arthritis Neg Hx     Amblyopia Neg Hx     Blindness Neg Hx     Macular degeneration Neg Hx     Retinal detachment Neg Hx     Strabismus Neg Hx     Stroke Neg Hx     Thyroid disease Neg Hx     Endometrial cancer Neg Hx     Vaginal cancer Neg Hx     Cervical cancer Neg Hx        Social History:  Social History     Socioeconomic History    Marital status:      Spouse name: Gasper     Number of children: 5    Years of education: Business school after high school    Highest education level: 12th grade   Occupational History    Not on file   Social Needs    Financial resource strain: Not very hard    Food insecurity     Worry: Never true     Inability: Never true    Transportation needs     Medical: No     Non-medical: No   Tobacco Use    Smoking status: Never Smoker    Smokeless tobacco: Never Used   Substance and Sexual Activity    Alcohol use: No    Drug use: No    Sexual activity: Not Currently     Partners: Male   Lifestyle    Physical activity     Days per week: 0 days     Minutes per session: 0 min    Stress: Not at all   Relationships    Social connections     Talks on phone: Once a week     Gets together: Once a week     Attends [employer] service: Never     Active member of club or organization: No     Attends meetings of clubs or organizations: Never     Relationship status:    Other Topics Concern    Not on file   Social History Narrative    Not on file       OBJECTIVE:    /74   Pulse 87   Temp 98.6 °F (37 °C)   Ht 5' 4" (1.626 m)   Wt 55.5 kg (122 lb 5.7 oz)   LMP  (LMP Unknown)   BMI 21.00 kg/m²     PHYSICAL EXAMINATION:    General appearance: Well appearing, in no acute distress, alert " and oriented x3.  Psych:  Mood and affect appropriate.  Skin: Skin color, texture, turgor normal, no rashes or lesions, in both upper and lower body.  Head/face:  Atraumatic, normocephalic. No palpable lymph nodes  Cor: RRR  Pulm: CTA  GI: Abdomen soft and non-tender.  Back: Straight leg raising in the sitting position is negative to radicular pain bilaterally. There is mild pain with palpation over lumbar facet joints. Limited ROM with mild pain on extension. Positive facet loading bilaterally.   Extremities: Peripheral joint ROM is full and pain free without obvious instability or laxity in all four extremities. No deformities, edema, or skin discoloration. Good capillary refill.  Musculoskeletal: Shoulder and knee provocative maneuvers are negative. There is pain with internal and external rotation of left hip. There is mild pain with palpation over left SI joint. FABERs is positive on the left. There is mild pain with palpation over left GTB. Bilateral lower extremity strength is normal and symmetric.  No atrophy or tone abnormalities are noted.  Neuro: Bilateral lower extremity coordination and muscle stretch reflexes are physiologic and symmetric.  Plantar response are downgoing. No loss of sensation is noted.  Gait: Antalgic- ambulates with electric scooter.     ASSESSMENT: 75 y.o. year old female with low back and hip pain, consistent with the followin. Chronic left hip pain     2. Primary osteoarthritis of left hip     3. Greater trochanteric bursitis of left hip     4. Sacroiliac joint pain     5. Lumbar spondylosis     6. Lumbar radiculopathy     7. DDD (degenerative disc disease), lumbar           PLAN:     - Previous imaging was reviewed and discussed with the patient today.    - Schedule for left femoral and obturator nerve block.     - If significant short term relief, we can consider RFA.     - She is s/p bilateral L5/S1 TF RHIANNA with benefit. We can repeat this as needed.     - Continue  current medications.     - I have stressed the importance of physical activity and a home exercise plan to help with pain and improve health.    - RTC PRN. She will call with pain diary results.     - Counseled patient regarding the importance of activity modification and constant sleeping habits.    - Dr. Richards was consulted on the patient and agrees with this plan.    The above plan and management options were discussed at length with patient. Patient is in agreement with the above and verbalized understanding.    Jenny Balbuena NP  11/02/2020

## 2020-11-04 ENCOUNTER — EXTERNAL HOME HEALTH (OUTPATIENT)
Dept: HOME HEALTH SERVICES | Facility: HOSPITAL | Age: 75
End: 2020-11-04
Payer: MEDICARE

## 2020-11-06 ENCOUNTER — DOCUMENT SCAN (OUTPATIENT)
Dept: HOME HEALTH SERVICES | Facility: HOSPITAL | Age: 75
End: 2020-11-06
Payer: MEDICARE

## 2020-11-06 ENCOUNTER — LAB VISIT (OUTPATIENT)
Dept: LAB | Facility: HOSPITAL | Age: 75
End: 2020-11-06
Attending: INTERNAL MEDICINE
Payer: MEDICARE

## 2020-11-06 DIAGNOSIS — D64.9 ANEMIA: ICD-10-CM

## 2020-11-06 LAB
ERYTHROCYTE [DISTWIDTH] IN BLOOD BY AUTOMATED COUNT: 13.1 % (ref 11.5–14.5)
HCT VFR BLD AUTO: 37.2 % (ref 37–48.5)
HGB BLD-MCNC: 12.2 G/DL (ref 12–16)
IMM GRANULOCYTES # BLD AUTO: 0.03 K/UL (ref 0–0.04)
MCH RBC QN AUTO: 32.7 PG (ref 27–31)
MCHC RBC AUTO-ENTMCNC: 32.8 G/DL (ref 32–36)
MCV RBC AUTO: 100 FL (ref 82–98)
NEUTROPHILS # BLD AUTO: 4.4 K/UL (ref 1.8–7.7)
PLATELET # BLD AUTO: 249 K/UL (ref 150–350)
PMV BLD AUTO: 8.9 FL (ref 9.2–12.9)
RBC # BLD AUTO: 3.73 M/UL (ref 4–5.4)
WBC # BLD AUTO: 6.29 K/UL (ref 3.9–12.7)

## 2020-11-06 PROCEDURE — 36415 COLL VENOUS BLD VENIPUNCTURE: CPT | Mod: HCNC

## 2020-11-06 PROCEDURE — 85027 COMPLETE CBC AUTOMATED: CPT | Mod: HCNC

## 2020-11-12 ENCOUNTER — HOSPITAL ENCOUNTER (OUTPATIENT)
Facility: OTHER | Age: 75
Discharge: HOME | End: 2020-11-12
Attending: ANESTHESIOLOGY | Admitting: ANESTHESIOLOGY
Payer: MEDICARE

## 2020-11-12 VITALS
SYSTOLIC BLOOD PRESSURE: 218 MMHG | DIASTOLIC BLOOD PRESSURE: 110 MMHG | HEART RATE: 104 BPM | OXYGEN SATURATION: 95 % | TEMPERATURE: 98 F | HEIGHT: 61 IN | BODY MASS INDEX: 22.84 KG/M2 | RESPIRATION RATE: 20 BRPM | WEIGHT: 121 LBS

## 2020-11-12 DIAGNOSIS — G89.29 CHRONIC PAIN: ICD-10-CM

## 2020-11-12 DIAGNOSIS — M25.552 LEFT HIP PAIN: Primary | ICD-10-CM

## 2020-11-12 DIAGNOSIS — G89.29 CHRONIC HIP PAIN, UNSPECIFIED LATERALITY: Primary | ICD-10-CM

## 2020-11-12 DIAGNOSIS — M25.559 CHRONIC HIP PAIN, UNSPECIFIED LATERALITY: Primary | ICD-10-CM

## 2020-11-12 LAB — POCT GLUCOSE: 195 MG/DL (ref 70–110)

## 2020-11-12 PROCEDURE — 82947 ASSAY GLUCOSE BLOOD QUANT: CPT | Mod: HCNC | Performed by: ANESTHESIOLOGY

## 2020-11-12 RX ORDER — ALPRAZOLAM 0.5 MG/1
0.5 TABLET ORAL
Status: DISCONTINUED | OUTPATIENT
Start: 2020-11-12 | End: 2020-11-12 | Stop reason: HOSPADM

## 2020-11-19 ENCOUNTER — HOSPITAL ENCOUNTER (OUTPATIENT)
Facility: OTHER | Age: 75
Discharge: HOME | End: 2020-11-19
Attending: ANESTHESIOLOGY | Admitting: ANESTHESIOLOGY
Payer: MEDICARE

## 2020-11-19 VITALS
OXYGEN SATURATION: 98 % | BODY MASS INDEX: 22.84 KG/M2 | RESPIRATION RATE: 16 BRPM | DIASTOLIC BLOOD PRESSURE: 71 MMHG | WEIGHT: 121 LBS | SYSTOLIC BLOOD PRESSURE: 141 MMHG | HEART RATE: 96 BPM | TEMPERATURE: 98 F | HEIGHT: 61 IN

## 2020-11-19 DIAGNOSIS — G89.29 CHRONIC PAIN: ICD-10-CM

## 2020-11-19 DIAGNOSIS — G89.29 CHRONIC HIP PAIN, UNSPECIFIED LATERALITY: ICD-10-CM

## 2020-11-19 DIAGNOSIS — M25.559 CHRONIC HIP PAIN, UNSPECIFIED LATERALITY: ICD-10-CM

## 2020-11-19 DIAGNOSIS — M16.10 PRIMARY OSTEOARTHRITIS OF HIP, UNSPECIFIED LATERALITY: Primary | ICD-10-CM

## 2020-11-19 LAB — POCT GLUCOSE: 185 MG/DL (ref 70–110)

## 2020-11-19 PROCEDURE — 64450 PR NERVE BLOCK INJ, ANES/STEROID, OTHER PERIPHERAL: ICD-10-PCS | Mod: HCNC,LT,, | Performed by: ANESTHESIOLOGY

## 2020-11-19 PROCEDURE — 25000003 PHARM REV CODE 250: Mod: HCNC | Performed by: STUDENT IN AN ORGANIZED HEALTH CARE EDUCATION/TRAINING PROGRAM

## 2020-11-19 PROCEDURE — 64450 NJX AA&/STRD OTHER PN/BRANCH: CPT | Mod: HCNC,LT | Performed by: ANESTHESIOLOGY

## 2020-11-19 PROCEDURE — 64450 NJX AA&/STRD OTHER PN/BRANCH: CPT | Mod: HCNC,LT,, | Performed by: ANESTHESIOLOGY

## 2020-11-19 PROCEDURE — 25000003 PHARM REV CODE 250: Mod: HCNC | Performed by: ANESTHESIOLOGY

## 2020-11-19 PROCEDURE — 64447 NJX AA&/STRD FEMORAL NRV IMG: CPT | Mod: LT | Performed by: ANESTHESIOLOGY

## 2020-11-19 RX ORDER — LIDOCAINE HYDROCHLORIDE 10 MG/ML
INJECTION INFILTRATION; PERINEURAL
Status: DISCONTINUED | OUTPATIENT
Start: 2020-11-19 | End: 2020-11-19 | Stop reason: HOSPADM

## 2020-11-19 RX ORDER — BUPIVACAINE HYDROCHLORIDE 2.5 MG/ML
INJECTION, SOLUTION EPIDURAL; INFILTRATION; INTRACAUDAL
Status: DISCONTINUED | OUTPATIENT
Start: 2020-11-19 | End: 2020-11-19 | Stop reason: HOSPADM

## 2020-11-19 RX ORDER — ALPRAZOLAM 0.5 MG/1
0.5 TABLET ORAL
Status: DISCONTINUED | OUTPATIENT
Start: 2020-11-19 | End: 2020-11-19 | Stop reason: HOSPADM

## 2020-11-19 RX ADMIN — ALPRAZOLAM 0.5 MG: 0.5 TABLET ORAL at 09:11

## 2020-11-19 NOTE — BRIEF OP NOTE
Discharge Note  Short Stay      SUMMARY     Admit Date: 11/19/2020    Attending Physician: Alfonso Richards      Discharge Physician: Alfonso Richards      Discharge Date: 11/19/2020 10:39 AM    Procedure(s) (LRB):  BLOCK, NERVE LEFT FEMORAL AND OBTURATOR (Left)    Final Diagnosis: Left hip pain [M25.552]    Disposition: Home or self care    Patient Instructions:   Current Discharge Medication List      CONTINUE these medications which have NOT CHANGED    Details   ACCU-CHEK FASTCLIX LANCING DEV Kit USE AS DIRECTED.  Qty: 1 each, Refills: 0    Associated Diagnoses: Controlled diabetes mellitus type 2 with complications      ALCOHOL ANTISEPTIC PADS (ALCOHOL PREP PADS TOP)       atorvastatin (LIPITOR) 20 MG tablet Take 1 tablet (20 mg total) by mouth once daily.  Qty: 90 tablet, Refills: 3    Associated Diagnoses: Combined 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      blood sugar diagnostic (ACCU-CHEK SMARTVIEW TEST STRIP) Strp Use as directed to check blood sugar twice daily.  Qty: 200 strip, Refills: 3    Associated Diagnoses: Controlled type 2 diabetes mellitus with complication, without long-term current use of insulin      blood-glucose meter kit Use as instructed  Qty: 1 each, Refills: 0    Comments: AccuCheck  Associated Diagnoses: Controlled type 2 diabetes mellitus without complication, without long-term current use of insulin      calcium carbonate (OS-MALCOLM) 600 mg calcium (1,500 mg) Tab Take 1 tablet by mouth 2 (two) times daily.       carvediloL (COREG) 25 MG tablet Take 1 tablet (25 mg total) by mouth 2 (two) times daily.  Qty: 180 tablet, Refills: 1    Comments: .  Associated Diagnoses: Essential hypertension      cloNIDine (CATAPRES) 0.3 MG tablet Take 1 tablet (0.3 mg total) by mouth 3 (three) times daily.  Qty: 270 tablet, Refills: 1    Comments: .  Associated Diagnoses: Essential hypertension      diaper,brief,adult,disposable Misc       epoetin german-epbx (RETACRIT) 10,000 unit/mL imjection  Inject 20,000 Units into the skin every 14 (fourteen) days.      fenofibrate 160 MG Tab Take 1 tablet by mouth once daily  Qty: 90 tablet, Refills: 3    Associated Diagnoses: Combined 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      FLUZONE HIGH-DOSE 2019-20, PF, 180 mcg/0.5 mL Syrg PHARMACIST ADMINISTERED IMMUNIZATION ADMINISTERED AT TIME OF DISPENSING  Refills: 0      FLUZONE HIGHDOSE QUAD 20-21  mcg/0.7 mL Syrg PHARMACIST ADMINISTERED IMMUNIZATION ADMINISTERED AT TIME OF DISPENSING      folic acid (FOLVITE) 1 MG tablet Take 1 tablet (1,000 mcg total) by mouth once daily.  Qty: 90 tablet, Refills: 0    Associated Diagnoses: Sarcoidosis; Myopathy      gabapentin (NEURONTIN) 400 MG capsule Take 1 capsule (400 mg total) by mouth 3 (three) times daily.  Qty: 90 capsule, Refills: 0    Associated Diagnoses: Chronic bilateral low back pain with left-sided sciatica      !! HYDROcodone-acetaminophen (NORCO) 5-325 mg per tablet Take 1 tablet by mouth every 6 (six) hours as needed.  Qty: 90 tablet, Refills: 0    Comments: Medically necessary for more than 7 days  Associated Diagnoses: Degenerative disc disease, lumbar      !! HYDROcodone-acetaminophen (NORCO) 5-325 mg per tablet Take 1 tablet by mouth every 6 (six) hours as needed.  Qty: 90 tablet, Refills: 0    Comments: Medically necessary for more than 7 days  Associated Diagnoses: Degenerative disc disease, lumbar      !! HYDROcodone-acetaminophen (NORCO) 5-325 mg per tablet Take 1 tablet by mouth every 6 (six) hours as needed.  Qty: 90 tablet, Refills: 0    Comments: Medically necessary for more than 7 days  Associated Diagnoses: Degenerative disc disease, lumbar      levothyroxine (EUTHYROX) 50 MCG tablet TAKE 1 TABLET BY MOUTH ONCE DAILY BEFORE BREAKFAST  Qty: 90 tablet, Refills: 1    Associated Diagnoses: Hypothyroidism, unspecified type      metFORMIN (GLUCOPHAGE) 1000 MG tablet Take 1 tablet (1,000 mg total) by mouth 2 (two) times daily with  meals.  Qty: 180 tablet, Refills: 1    Associated Diagnoses: Diabetes mellitus, type 2; Diabetes mellitus with stage 3 chronic kidney disease      NIFEdipine (PROCARDIA-XL) 30 MG (OSM) 24 hr tablet Take 1 tablet by mouth once daily  Qty: 90 tablet, Refills: 1    Associated Diagnoses: Essential hypertension      oxybutynin (DITROPAN-XL) 5 MG TR24 Take 1 tablet (5 mg total) by mouth once daily.  Qty: 30 tablet, Refills: 11      predniSONE (DELTASONE) 5 MG tablet Take 1.5 tablets (7.5 mg total) by mouth once daily.  Qty: 135 tablet, Refills: 1    Associated Diagnoses: Sarcoidosis; Myopathy      tiZANidine (ZANAFLEX) 2 MG tablet Take 2 tablets (4 mg total) by mouth every 8 (eight) hours as needed.  Qty: 180 tablet, Refills: 0    Associated Diagnoses: Myalgia       !! - Potential duplicate medications found. Please discuss with provider.              Discharge Diagnosis: Left hip pain [M25.552]  Condition on Discharge: Stable with no complications to procedure   Diet on Discharge: Same as before.  Activity: as per instruction sheet.  Discharge to: Home with a responsible adult.  Follow up: 2-4 weeks       Please call my office or pager at [phone] if experienced any weakness or loss of sensation, fever > 101.5, pain uncontrolled with oral medications, persistent nausea/vomiting/or diarrhea, redness or drainage from the incisions, or any other worrisome concerns. If physician on call was not reached or could not communicate with our office for any reason please go to the nearest emergency department

## 2020-11-19 NOTE — OP NOTE
"Block of Femoral and Obturator Nerves Under Fluoroscopy   Time-out taken to identify patient and procedure side prior to starting the procedure.   I attest that I have reviewed the patient's home medications prior to the procedure and no contraindication have been identified. I  re-evaluated the patient after the patient was positioned for the procedure in the procedure room immediately before the procedural time-out. The vital signs are current and represent the current state of the patient which has not significantly changed since the preprocedure assessment.    Date of Service: 11/19/2020      PCP: Micaela Mendoza MD    PROCEDURE:  Left  femoral and obturator nerve blocks    REASON FOR PROCEDURE: Left hip pain [M25.552]  1. Primary osteoarthritis of hip, unspecified laterality    2. Chronic hip pain, unspecified laterality    3. Chronic pain      POSTPROCEDURE DIAGNOSIS:   Left hip pain [M25.552]    1. Primary osteoarthritis of hip, unspecified laterality    2. Chronic hip pain, unspecified laterality    3. Chronic pain           PHYSICIAN: Alfonso Richards MD  ASSISTANTS:   Cruz Mon MD Pain Fellow  EElie Kelly MD  [employer] PM&R,, PGY2      MEDICATIONS INJECTED: Preservative free Bupivacaine 0.25%. Of that, 1ml injected at each nerve    LOCAL ANESTHETIC USED: Xylocaine 1% with Bupivacaine 0.25% and Sodium Bicarbonate 1ml.     SEDATION MEDICATIONS: None    ESTIMATED BLOOD LOSS: None    COMPLICATIONS: None.    TECHNIQUE: Laying in a supine position, the patient was prepped and draped in the usual sterile fashion using ChloraPrep and fenestrated drape. The hip joint was visualized under fluoroscopic guidance. Local anesthetic was given using the 27-gauge 1.5" needle after raising a wheal. The 3.5" 26-gauge needle was introduced from the lateral aspect utilizing live fluoroscopy and advanced to the femoral nerve superior to the acetabulum at the 12 o'clock position. Medication was then injected slowly. Another 3.5" " 26-gauge needle was directed to the obturator nerve inferior and medial to the acetabulum directly lateral the ischial foramen. Medication was injected. The same was repeated on the contralateral side.The patient tolerated the procedure well.    PAIN BEFORE THE PROCEDURE: 10/10.    PAIN AFTER THE PROCEDURE: 0/10    The patient was monitored after the procedure. Sensory and motor exam in both lower extremities grossly intact for bilateral femoral and obturator nerves. Patient was given post procedure and discharge instructions to follow at home. We will see the patient back in two weeks or the patient may call to inform of status. The patient was discharged in a stable condition.

## 2020-11-19 NOTE — DISCHARGE INSTRUCTIONS

## 2020-11-20 ENCOUNTER — INFUSION (OUTPATIENT)
Dept: INFUSION THERAPY | Facility: HOSPITAL | Age: 75
End: 2020-11-20
Attending: INTERNAL MEDICINE
Payer: MEDICARE

## 2020-11-20 ENCOUNTER — TELEPHONE (OUTPATIENT)
Dept: PAIN MEDICINE | Facility: CLINIC | Age: 75
End: 2020-11-20

## 2020-11-20 ENCOUNTER — LAB VISIT (OUTPATIENT)
Dept: LAB | Facility: HOSPITAL | Age: 75
End: 2020-11-20
Attending: INTERNAL MEDICINE
Payer: MEDICARE

## 2020-11-20 VITALS
TEMPERATURE: 98 F | HEART RATE: 79 BPM | SYSTOLIC BLOOD PRESSURE: 129 MMHG | OXYGEN SATURATION: 98 % | RESPIRATION RATE: 16 BRPM | DIASTOLIC BLOOD PRESSURE: 63 MMHG

## 2020-11-20 DIAGNOSIS — Z53.1 REFUSAL OF BLOOD TRANSFUSIONS AS PATIENT IS JEHOVAH'S WITNESS: ICD-10-CM

## 2020-11-20 DIAGNOSIS — N18.9 ANEMIA IN CHRONIC KIDNEY DISEASE, UNSPECIFIED CKD STAGE: Primary | ICD-10-CM

## 2020-11-20 DIAGNOSIS — D50.9 IRON DEFICIENCY ANEMIA, UNSPECIFIED IRON DEFICIENCY ANEMIA TYPE: ICD-10-CM

## 2020-11-20 DIAGNOSIS — D64.9 ANEMIA: ICD-10-CM

## 2020-11-20 DIAGNOSIS — D63.1 ANEMIA IN CHRONIC KIDNEY DISEASE, UNSPECIFIED CKD STAGE: Primary | ICD-10-CM

## 2020-11-20 LAB
ERYTHROCYTE [DISTWIDTH] IN BLOOD BY AUTOMATED COUNT: 12.6 % (ref 11.5–14.5)
HCT VFR BLD AUTO: 32.4 % (ref 37–48.5)
HGB BLD-MCNC: 10.7 G/DL (ref 12–16)
IMM GRANULOCYTES # BLD AUTO: 0.03 K/UL (ref 0–0.04)
MCH RBC QN AUTO: 32.5 PG (ref 27–31)
MCHC RBC AUTO-ENTMCNC: 33 G/DL (ref 32–36)
MCV RBC AUTO: 99 FL (ref 82–98)
NEUTROPHILS # BLD AUTO: 3.2 K/UL (ref 1.8–7.7)
PLATELET # BLD AUTO: 227 K/UL (ref 150–350)
PMV BLD AUTO: 9.1 FL (ref 9.2–12.9)
RBC # BLD AUTO: 3.29 M/UL (ref 4–5.4)
WBC # BLD AUTO: 5.57 K/UL (ref 3.9–12.7)

## 2020-11-20 PROCEDURE — 36415 COLL VENOUS BLD VENIPUNCTURE: CPT | Mod: HCNC

## 2020-11-20 PROCEDURE — 63600175 PHARM REV CODE 636 W HCPCS: Mod: HCNC | Performed by: INTERNAL MEDICINE

## 2020-11-20 PROCEDURE — 85027 COMPLETE CBC AUTOMATED: CPT | Mod: HCNC

## 2020-11-20 PROCEDURE — 96372 THER/PROPH/DIAG INJ SC/IM: CPT | Mod: HCNC

## 2020-11-20 RX ADMIN — EPOETIN ALFA-EPBX 20000 UNITS: 10000 INJECTION, SOLUTION INTRAVENOUS; SUBCUTANEOUS at 11:11

## 2020-11-20 NOTE — TELEPHONE ENCOUNTER
----- Message from Deidra Terrazas sent at 11/20/2020 12:48 PM CST -----  Regarding: Pain Diary  Who called:CANDELARIA SANTANA [[id number]]    Patient is calling to give pain level diary. States she is feeling relief and the pain is much better than what was and that she is now able to move. States she has a slight throbbing pain.    First Hour: 70% Level 3    Second Hour: % Level Fell Sleep    Third Hour: % Level Fell Sleep     Ninth Hour: 70 % Level 3

## 2020-11-20 NOTE — PLAN OF CARE
Pt arrived to unit. VSs. Pt afebrile. Tolerated injection. Pt has next appt. Pt ambulated off unit. No distress noted.

## 2020-11-23 ENCOUNTER — OFFICE VISIT (OUTPATIENT)
Dept: OPHTHALMOLOGY | Facility: CLINIC | Age: 75
End: 2020-11-23
Attending: OPHTHALMOLOGY
Payer: MEDICARE

## 2020-11-23 ENCOUNTER — TELEPHONE (OUTPATIENT)
Dept: PAIN MEDICINE | Facility: CLINIC | Age: 75
End: 2020-11-23

## 2020-11-23 DIAGNOSIS — H34.8120 HEMISPHERIC RETINAL VEIN OCCLUSION WITH MACULAR EDEMA OF LEFT EYE: Primary | ICD-10-CM

## 2020-11-23 PROCEDURE — 1157F ADVNC CARE PLAN IN RCRD: CPT | Mod: HCNC,S$GLB,, | Performed by: OPHTHALMOLOGY

## 2020-11-23 PROCEDURE — 92012 INTRM OPH EXAM EST PATIENT: CPT | Mod: HCNC,S$GLB,, | Performed by: OPHTHALMOLOGY

## 2020-11-23 PROCEDURE — 2023F PR DILATED RETINAL EXAM W/O EVID OF RETINOPATHY: ICD-10-PCS | Mod: HCNC,S$GLB,, | Performed by: OPHTHALMOLOGY

## 2020-11-23 PROCEDURE — 92134 POSTERIOR SEGMENT OCT RETINA (OCULAR COHERENCE TOMOGRAPHY)-BOTH EYES: ICD-10-PCS | Mod: HCNC,S$GLB,, | Performed by: OPHTHALMOLOGY

## 2020-11-23 PROCEDURE — 1157F PR ADVANCE CARE PLAN OR EQUIV PRESENT IN MEDICAL RECORD: ICD-10-PCS | Mod: HCNC,S$GLB,, | Performed by: OPHTHALMOLOGY

## 2020-11-23 PROCEDURE — 99999 PR PBB SHADOW E&M-EST. PATIENT-LVL III: ICD-10-PCS | Mod: PBBFAC,HCNC,, | Performed by: OPHTHALMOLOGY

## 2020-11-23 PROCEDURE — 3288F PR FALLS RISK ASSESSMENT DOCUMENTED: ICD-10-PCS | Mod: HCNC,CPTII,S$GLB, | Performed by: OPHTHALMOLOGY

## 2020-11-23 PROCEDURE — 3288F FALL RISK ASSESSMENT DOCD: CPT | Mod: HCNC,CPTII,S$GLB, | Performed by: OPHTHALMOLOGY

## 2020-11-23 PROCEDURE — 92134 CPTRZ OPH DX IMG PST SGM RTA: CPT | Mod: HCNC,S$GLB,, | Performed by: OPHTHALMOLOGY

## 2020-11-23 PROCEDURE — 1101F PR PT FALLS ASSESS DOC 0-1 FALLS W/OUT INJ PAST YR: ICD-10-PCS | Mod: HCNC,CPTII,S$GLB, | Performed by: OPHTHALMOLOGY

## 2020-11-23 PROCEDURE — 1126F AMNT PAIN NOTED NONE PRSNT: CPT | Mod: HCNC,S$GLB,, | Performed by: OPHTHALMOLOGY

## 2020-11-23 PROCEDURE — 1101F PT FALLS ASSESS-DOCD LE1/YR: CPT | Mod: HCNC,CPTII,S$GLB, | Performed by: OPHTHALMOLOGY

## 2020-11-23 PROCEDURE — 92012 PR EYE EXAM, EST PATIENT,INTERMED: ICD-10-PCS | Mod: HCNC,S$GLB,, | Performed by: OPHTHALMOLOGY

## 2020-11-23 PROCEDURE — 2023F DILAT RTA XM W/O RTNOPTHY: CPT | Mod: HCNC,S$GLB,, | Performed by: OPHTHALMOLOGY

## 2020-11-23 PROCEDURE — 99999 PR PBB SHADOW E&M-EST. PATIENT-LVL III: CPT | Mod: PBBFAC,HCNC,, | Performed by: OPHTHALMOLOGY

## 2020-11-23 PROCEDURE — 1126F PR PAIN SEVERITY QUANTIFIED, NO PAIN PRESENT: ICD-10-PCS | Mod: HCNC,S$GLB,, | Performed by: OPHTHALMOLOGY

## 2020-11-23 NOTE — PROGRESS NOTES
Subjective:       Patient ID: Regino Lawrence is a 75 y.o. female      Chief Complaint   Patient presents with    HRVO W/O ME left eye     History of Present Illness  HPI     DLS 10/21/2020 by Dr. ROSARIO Baker MD    74 yo female here today for 1 mo follow up , OCT and DFE    Pt reports that vision is the same OU.  Has some occ itching and tearing ,   using AT's and this helps     Gtts  AT's QID OU        Ocular   1. HRVO w/ ME left eye    Hx avastin injections   2. NO ME post injections left eye     Last edited by Hemanth Baker MD on 11/23/2020  1:05 PM. (History)        Imaging:    See report    Assessment/Plan:     1. Hemispheric retinal vein occlusion with macular edema of left eye  No ME after 4 month inj interval 2 times.    Today is 5 mo s/p Av and has min inc in ME with excellent Va.  Discussed option of inj today and q 4 mo vs continued obs and RBA  Pt wishes to observe.  Understands vision may worsen if ME worsens    CV risk factor control    - Posterior Segment OCT Retina-Both eyes    Follow up in about 1 month (around 12/23/2020), or if symptoms worsen or fail to improve, for Dilated examination, OCT Mac.

## 2020-11-27 ENCOUNTER — PATIENT MESSAGE (OUTPATIENT)
Dept: FAMILY MEDICINE | Facility: CLINIC | Age: 75
End: 2020-11-27

## 2020-11-27 DIAGNOSIS — M51.36 DEGENERATIVE DISC DISEASE, LUMBAR: ICD-10-CM

## 2020-11-27 RX ORDER — HYDROCODONE BITARTRATE AND ACETAMINOPHEN 5; 325 MG/1; MG/1
1 TABLET ORAL EVERY 6 HOURS PRN
Qty: 90 TABLET | Refills: 0 | Status: CANCELLED | OUTPATIENT
Start: 2020-11-27 | End: 2020-12-27

## 2020-11-30 ENCOUNTER — TELEPHONE (OUTPATIENT)
Dept: PAIN MEDICINE | Facility: CLINIC | Age: 75
End: 2020-11-30

## 2020-12-04 ENCOUNTER — INFUSION (OUTPATIENT)
Dept: INFUSION THERAPY | Facility: HOSPITAL | Age: 75
End: 2020-12-04
Attending: INTERNAL MEDICINE
Payer: MEDICARE

## 2020-12-04 ENCOUNTER — LAB VISIT (OUTPATIENT)
Dept: LAB | Facility: HOSPITAL | Age: 75
End: 2020-12-04
Attending: INTERNAL MEDICINE
Payer: MEDICARE

## 2020-12-04 VITALS
HEART RATE: 78 BPM | OXYGEN SATURATION: 99 % | SYSTOLIC BLOOD PRESSURE: 126 MMHG | DIASTOLIC BLOOD PRESSURE: 60 MMHG | TEMPERATURE: 98 F | RESPIRATION RATE: 18 BRPM

## 2020-12-04 DIAGNOSIS — D50.9 IRON DEFICIENCY ANEMIA, UNSPECIFIED IRON DEFICIENCY ANEMIA TYPE: ICD-10-CM

## 2020-12-04 DIAGNOSIS — Z53.1 REFUSAL OF BLOOD TRANSFUSIONS AS PATIENT IS JEHOVAH'S WITNESS: ICD-10-CM

## 2020-12-04 DIAGNOSIS — D64.9 ANEMIA: ICD-10-CM

## 2020-12-04 DIAGNOSIS — D63.1 ANEMIA IN CHRONIC KIDNEY DISEASE, UNSPECIFIED CKD STAGE: Primary | ICD-10-CM

## 2020-12-04 DIAGNOSIS — N18.9 ANEMIA IN CHRONIC KIDNEY DISEASE, UNSPECIFIED CKD STAGE: Primary | ICD-10-CM

## 2020-12-04 DIAGNOSIS — E03.9 HYPOTHYROIDISM, UNSPECIFIED TYPE: ICD-10-CM

## 2020-12-04 DIAGNOSIS — E11.9 TYPE 2 DIABETES MELLITUS WITHOUT COMPLICATION: ICD-10-CM

## 2020-12-04 LAB
ERYTHROCYTE [DISTWIDTH] IN BLOOD BY AUTOMATED COUNT: 13.3 % (ref 11.5–14.5)
HCT VFR BLD AUTO: 33.4 % (ref 37–48.5)
HGB BLD-MCNC: 10.9 G/DL (ref 12–16)
IMM GRANULOCYTES # BLD AUTO: 0.03 K/UL (ref 0–0.04)
MCH RBC QN AUTO: 32.2 PG (ref 27–31)
MCHC RBC AUTO-ENTMCNC: 32.6 G/DL (ref 32–36)
MCV RBC AUTO: 99 FL (ref 82–98)
NEUTROPHILS # BLD AUTO: 4 K/UL (ref 1.8–7.7)
PLATELET # BLD AUTO: 261 K/UL (ref 150–350)
PMV BLD AUTO: 9.5 FL (ref 9.2–12.9)
RBC # BLD AUTO: 3.38 M/UL (ref 4–5.4)
WBC # BLD AUTO: 6.36 K/UL (ref 3.9–12.7)

## 2020-12-04 PROCEDURE — 85027 COMPLETE CBC AUTOMATED: CPT | Mod: HCNC

## 2020-12-04 PROCEDURE — 36415 COLL VENOUS BLD VENIPUNCTURE: CPT | Mod: HCNC

## 2020-12-04 PROCEDURE — 96372 THER/PROPH/DIAG INJ SC/IM: CPT | Mod: HCNC

## 2020-12-04 PROCEDURE — 63600175 PHARM REV CODE 636 W HCPCS: Mod: HCNC | Performed by: INTERNAL MEDICINE

## 2020-12-04 RX ORDER — LEVOTHYROXINE SODIUM 50 UG/1
TABLET ORAL
Qty: 90 TABLET | Refills: 1 | Status: SHIPPED | OUTPATIENT
Start: 2020-12-04 | End: 2021-02-04 | Stop reason: SDUPTHER

## 2020-12-04 RX ADMIN — EPOETIN ALFA-EPBX 20000 UNITS: 10000 INJECTION, SOLUTION INTRAVENOUS; SUBCUTANEOUS at 12:12

## 2020-12-04 NOTE — PLAN OF CARE
Hgb 10.9 (from 10.7 two weeks earlier) Patient received RETAcrit 20,000 units. Tolerated injection well. VSS. Reports increased fatigue, mild FAUSTIN, and generalized body aches. She received discharge instructions and follow up appointments. She will return in 2 weeks for routine labs and retacrit pending results. Patient verbalized understanding and ambulated with walker off unit, accompanied by daughter. Patient in NAD at time of discharge.

## 2020-12-05 DIAGNOSIS — G89.29 CHRONIC BILATERAL LOW BACK PAIN WITH LEFT-SIDED SCIATICA: ICD-10-CM

## 2020-12-05 DIAGNOSIS — M54.42 CHRONIC BILATERAL LOW BACK PAIN WITH LEFT-SIDED SCIATICA: ICD-10-CM

## 2020-12-06 RX ORDER — GABAPENTIN 400 MG/1
CAPSULE ORAL
Qty: 90 CAPSULE | Refills: 1 | Status: SHIPPED | OUTPATIENT
Start: 2020-12-06 | End: 2021-02-04 | Stop reason: SDUPTHER

## 2020-12-08 ENCOUNTER — PATIENT OUTREACH (OUTPATIENT)
Dept: ADMINISTRATIVE | Facility: OTHER | Age: 75
End: 2020-12-08

## 2020-12-09 ENCOUNTER — LAB VISIT (OUTPATIENT)
Dept: LAB | Facility: HOSPITAL | Age: 75
End: 2020-12-09
Attending: INTERNAL MEDICINE
Payer: MEDICARE

## 2020-12-09 ENCOUNTER — PATIENT MESSAGE (OUTPATIENT)
Dept: FAMILY MEDICINE | Facility: CLINIC | Age: 75
End: 2020-12-09

## 2020-12-09 ENCOUNTER — OFFICE VISIT (OUTPATIENT)
Dept: RHEUMATOLOGY | Facility: CLINIC | Age: 75
End: 2020-12-09
Payer: MEDICARE

## 2020-12-09 VITALS
SYSTOLIC BLOOD PRESSURE: 180 MMHG | BODY MASS INDEX: 22.86 KG/M2 | HEART RATE: 71 BPM | WEIGHT: 121.06 LBS | TEMPERATURE: 98 F | DIASTOLIC BLOOD PRESSURE: 93 MMHG | HEIGHT: 61 IN

## 2020-12-09 DIAGNOSIS — R53.83 FATIGUE, UNSPECIFIED TYPE: ICD-10-CM

## 2020-12-09 DIAGNOSIS — D86.86 SARCOID ARTHROPATHY: ICD-10-CM

## 2020-12-09 DIAGNOSIS — G72.9 MYOPATHY: ICD-10-CM

## 2020-12-09 DIAGNOSIS — D84.9 IMMUNOSUPPRESSION: ICD-10-CM

## 2020-12-09 DIAGNOSIS — D86.9 SARCOIDOSIS: ICD-10-CM

## 2020-12-09 DIAGNOSIS — D86.9 SARCOIDOSIS: Primary | ICD-10-CM

## 2020-12-09 LAB
ALBUMIN SERPL BCP-MCNC: 4.1 G/DL (ref 3.5–5.2)
ALP SERPL-CCNC: 68 U/L (ref 55–135)
ALT SERPL W/O P-5'-P-CCNC: 12 U/L (ref 10–44)
ANION GAP SERPL CALC-SCNC: 11 MMOL/L (ref 8–16)
AST SERPL-CCNC: 13 U/L (ref 10–40)
BILIRUB SERPL-MCNC: 0.4 MG/DL (ref 0.1–1)
BUN SERPL-MCNC: 13 MG/DL (ref 8–23)
CALCIUM SERPL-MCNC: 9.3 MG/DL (ref 8.7–10.5)
CCP AB SER IA-ACNC: <0.5 U/ML
CHLORIDE SERPL-SCNC: 101 MMOL/L (ref 95–110)
CK SERPL-CCNC: 205 U/L (ref 20–180)
CO2 SERPL-SCNC: 29 MMOL/L (ref 23–29)
CREAT SERPL-MCNC: 0.9 MG/DL (ref 0.5–1.4)
CRP SERPL-MCNC: 1.8 MG/L (ref 0–8.2)
ERYTHROCYTE [SEDIMENTATION RATE] IN BLOOD BY WESTERGREN METHOD: 11 MM/HR (ref 0–36)
EST. GFR  (AFRICAN AMERICAN): >60 ML/MIN/1.73 M^2
EST. GFR  (NON AFRICAN AMERICAN): >60 ML/MIN/1.73 M^2
GLUCOSE SERPL-MCNC: 149 MG/DL (ref 70–110)
POTASSIUM SERPL-SCNC: 3.6 MMOL/L (ref 3.5–5.1)
PROT SERPL-MCNC: 7.1 G/DL (ref 6–8.4)
RHEUMATOID FACT SERPL-ACNC: <10 IU/ML (ref 0–15)
SODIUM SERPL-SCNC: 141 MMOL/L (ref 136–145)

## 2020-12-09 PROCEDURE — 1125F AMNT PAIN NOTED PAIN PRSNT: CPT | Mod: HCNC,S$GLB,, | Performed by: INTERNAL MEDICINE

## 2020-12-09 PROCEDURE — 3077F PR MOST RECENT SYSTOLIC BLOOD PRESSURE >= 140 MM HG: ICD-10-PCS | Mod: HCNC,CPTII,S$GLB, | Performed by: INTERNAL MEDICINE

## 2020-12-09 PROCEDURE — 36415 COLL VENOUS BLD VENIPUNCTURE: CPT | Mod: HCNC

## 2020-12-09 PROCEDURE — 99999 PR PBB SHADOW E&M-EST. PATIENT-LVL V: ICD-10-PCS | Mod: PBBFAC,HCNC,, | Performed by: INTERNAL MEDICINE

## 2020-12-09 PROCEDURE — 85652 RBC SED RATE AUTOMATED: CPT | Mod: HCNC

## 2020-12-09 PROCEDURE — 3077F SYST BP >= 140 MM HG: CPT | Mod: HCNC,CPTII,S$GLB, | Performed by: INTERNAL MEDICINE

## 2020-12-09 PROCEDURE — 99214 PR OFFICE/OUTPT VISIT, EST, LEVL IV, 30-39 MIN: ICD-10-PCS | Mod: HCNC,25,S$GLB, | Performed by: INTERNAL MEDICINE

## 2020-12-09 PROCEDURE — 3080F PR MOST RECENT DIASTOLIC BLOOD PRESSURE >= 90 MM HG: ICD-10-PCS | Mod: HCNC,CPTII,S$GLB, | Performed by: INTERNAL MEDICINE

## 2020-12-09 PROCEDURE — 82164 ANGIOTENSIN I ENZYME TEST: CPT | Mod: HCNC

## 2020-12-09 PROCEDURE — 1159F MED LIST DOCD IN RCRD: CPT | Mod: HCNC,S$GLB,, | Performed by: INTERNAL MEDICINE

## 2020-12-09 PROCEDURE — 1157F PR ADVANCE CARE PLAN OR EQUIV PRESENT IN MEDICAL RECORD: ICD-10-PCS | Mod: HCNC,S$GLB,, | Performed by: INTERNAL MEDICINE

## 2020-12-09 PROCEDURE — 86200 CCP ANTIBODY: CPT | Mod: HCNC

## 2020-12-09 PROCEDURE — 96372 THER/PROPH/DIAG INJ SC/IM: CPT | Mod: HCNC,S$GLB,, | Performed by: INTERNAL MEDICINE

## 2020-12-09 PROCEDURE — 96372 PR INJECTION,THERAP/PROPH/DIAG2ST, IM OR SUBCUT: ICD-10-PCS | Mod: HCNC,S$GLB,, | Performed by: INTERNAL MEDICINE

## 2020-12-09 PROCEDURE — 1125F PR PAIN SEVERITY QUANTIFIED, PAIN PRESENT: ICD-10-PCS | Mod: HCNC,S$GLB,, | Performed by: INTERNAL MEDICINE

## 2020-12-09 PROCEDURE — 99214 OFFICE O/P EST MOD 30 MIN: CPT | Mod: HCNC,25,S$GLB, | Performed by: INTERNAL MEDICINE

## 2020-12-09 PROCEDURE — 1157F ADVNC CARE PLAN IN RCRD: CPT | Mod: HCNC,S$GLB,, | Performed by: INTERNAL MEDICINE

## 2020-12-09 PROCEDURE — 99999 PR PBB SHADOW E&M-EST. PATIENT-LVL V: CPT | Mod: PBBFAC,HCNC,, | Performed by: INTERNAL MEDICINE

## 2020-12-09 PROCEDURE — 3080F DIAST BP >= 90 MM HG: CPT | Mod: HCNC,CPTII,S$GLB, | Performed by: INTERNAL MEDICINE

## 2020-12-09 PROCEDURE — 1159F PR MEDICATION LIST DOCUMENTED IN MEDICAL RECORD: ICD-10-PCS | Mod: HCNC,S$GLB,, | Performed by: INTERNAL MEDICINE

## 2020-12-09 PROCEDURE — 82550 ASSAY OF CK (CPK): CPT | Mod: HCNC

## 2020-12-09 PROCEDURE — 86140 C-REACTIVE PROTEIN: CPT | Mod: HCNC

## 2020-12-09 PROCEDURE — 86431 RHEUMATOID FACTOR QUANT: CPT | Mod: HCNC

## 2020-12-09 PROCEDURE — 80053 COMPREHEN METABOLIC PANEL: CPT | Mod: HCNC

## 2020-12-09 RX ORDER — TRIAMCINOLONE ACETONIDE 40 MG/ML
80 INJECTION, SUSPENSION INTRA-ARTICULAR; INTRAMUSCULAR
Status: COMPLETED | OUTPATIENT
Start: 2020-12-09 | End: 2020-12-09

## 2020-12-09 RX ORDER — PREDNISONE 5 MG/1
5 TABLET ORAL DAILY
Qty: 90 TABLET | Refills: 0 | Status: SHIPPED | OUTPATIENT
Start: 2020-12-09 | End: 2021-04-05 | Stop reason: SDUPTHER

## 2020-12-09 RX ADMIN — TRIAMCINOLONE ACETONIDE 80 MG: 40 INJECTION, SUSPENSION INTRA-ARTICULAR; INTRAMUSCULAR at 11:12

## 2020-12-09 ASSESSMENT — ROUTINE ASSESSMENT OF PATIENT INDEX DATA (RAPID3)
FATIGUE SCORE: 10
MDHAQ FUNCTION SCORE: 3
WHEN YOU AWAKENED IN THE MORNING OVER THE LAST WEEK, PLEASE INDICATE THE AMOUNT OF TIME IT TAKES UNTIL YOU ARE AS LIMBER AS YOU WILL BE FOR THE DAY: 24 HOURS
AM STIFFNESS SCORE: 1, YES
PAIN SCORE: 10
PSYCHOLOGICAL DISTRESS SCORE: 3.3
PATIENT GLOBAL ASSESSMENT SCORE: 10
TOTAL RAPID3 SCORE: 10

## 2020-12-09 NOTE — PROGRESS NOTES
"Subjective:       Patient ID: Regino Lawrence is a 75 y.o. female.    Chief Complaint: Sarcoidosis    HPI:  Regino Lawrence is a 75 y.o. female with history of sarcoidosis with associated myopathy and   arthropathy. Sarcoidosis that was first manifested by muscle inflammation, low white   blood cell count, hair loss, skin involvement. She was treated in the   past with methotrexate and Plaquenil, both of which were ineffective.   Cellcept and Imuran caused some unknown side effect (she thinks it made her sick).   Colchicine was held due to low WBC.   Although methotrexate did not help in past it was retried and she felt it helped hair growth but did not help body aches.   She held MTX due to an URI but patient has not wanted restarted since then (2013).   Leflunomide was held due to elevated BP after less than a week of use.    Interval History:     Recently hip pain treated with nerve block but did not help.     Hands continue to swell and be difficult to open and close.    Pain 10/10 ache in hands and hips.  Tizanidine helps some.     Currently on prednisone 7.5 mg for 4 days previously on 5 mg.  She stopped MTX since it made her sick with fever, chills, tiredness, nausea, muscle pain.  .      Review of Systems   Constitutional: Positive for fatigue.   HENT:        Dry mouth   Eyes:        Dry eyes   Respiratory: Negative.  Negative for shortness of breath.    Cardiovascular: Negative.    Gastrointestinal: Negative.    Endocrine: Negative.    Genitourinary: Negative.    Musculoskeletal: Positive for arthralgias, back pain, joint swelling and myalgias.   Skin: Negative.    Allergic/Immunologic: Negative.    Neurological: Negative.    Hematological: Negative.    Psychiatric/Behavioral: Negative.          Objective:   BP (!) 180/93   Pulse 71   Temp 98.1 °F (36.7 °C)   Ht 5' 1" (1.549 m)   Wt 54.9 kg (121 lb 0.5 oz)   LMP  (LMP Unknown)   BMI 22.87 kg/m²      Physical Exam   Constitutional: She is oriented to " person, place, and time and well-developed, well-nourished, and in no distress.   HENT:   Head: Normocephalic and atraumatic.   Eyes: Conjunctivae and EOM are normal.   Cardiovascular: Normal rate, regular rhythm and normal heart sounds.    Pulmonary/Chest: Effort normal and breath sounds normal.   Abdominal: Soft. Bowel sounds are normal.   Neurological: She is alert and oriented to person, place, and time.   With cane   Skin: Skin is warm and dry.     Psychiatric: Mood and affect normal.   Musculoskeletal:      Comments: 4/5 UE and LE proximal strength bilaterally unchanged  28 joint count: 10 tender (MCPs and PIPs right hand) and 5 swollen (right MCPs).    No pain on compression of MTPs            LABS    Component      Latest Ref Rng & Units 12/4/2020 11/20/2020 11/19/2020 8/12/2020   Sodium      136 - 145 mmol/L    140   Potassium      3.5 - 5.1 mmol/L    3.4 (L)   Chloride      95 - 110 mmol/L    107   CO2      23 - 29 mmol/L    24   Glucose      70 - 110 mg/dL    166 (H)   BUN      8 - 23 mg/dL    18   Creatinine      0.5 - 1.4 mg/dL    1.2   Calcium      8.7 - 10.5 mg/dL    8.7   PROTEIN TOTAL      6.0 - 8.4 g/dL    6.7   Albumin      3.5 - 5.2 g/dL    3.6   BILIRUBIN TOTAL      0.1 - 1.0 mg/dL    0.4   Alkaline Phosphatase      55 - 135 U/L    54 (L)   AST      10 - 40 U/L    13   ALT      10 - 44 U/L    12   Anion Gap      8 - 16 mmol/L    9   eGFR if African American      >60 mL/min/1.73 m:2    51.4 (A)   eGFR if non African American      >60 mL/min/1.73 m:2    44.6 (A)   WBC      3.90 - 12.70 K/uL 6.36 5.57     RBC      4.00 - 5.40 M/uL 3.38 (L) 3.29 (L)     Hemoglobin      12.0 - 16.0 g/dL 10.9 (L) 10.7 (L)     Hematocrit      37.0 - 48.5 % 33.4 (L) 32.4 (L)     MCV      82 - 98 fL 99 (H) 99 (H)     MCH      27.0 - 31.0 pg 32.2 (H) 32.5 (H)     MCHC      32.0 - 36.0 g/dL 32.6 33.0     RDW      11.5 - 14.5 % 13.3 12.6     Platelets      150 - 350 K/uL 261 227     MPV      9.2 - 12.9 fL 9.5 9.1 (L)      Gran # (ANC)      1.8 - 7.7 K/uL 4.0 3.2     Immature Grans (Abs)      0.00 - 0.04 K/uL 0.03 0.03     CPK      20 - 180 U/L    165   Sed Rate      0 - 36 mm/Hr    8   CRP      0.0 - 8.2 mg/L    3.4   POCT Glucose      70 - 110 mg/dL   185 (H)         Assessment:       1.   Sarcoidosis. Manifested by myopathy and arthropathy. Persistent joint pain and myalgias despite prednisone 7.5 mg for 4 days.  Now with hand pain and joint swelling again   2.   Myalgia and myositis.  History of fibromyalgia   3.   Osteopenia. Took Fosamax for 5 years stopped 6/2013.  Awaiting patient decision on Prolia after she sees dentis.    4.   Fatigue     5.   Diabetes mellitus type 2 in nonobese.  Last      6.           Neck pain. X-ray with degenerative changes. S/p laminectomy-cervical fusion C3-C7 11/16/2015 for cervical spinal stenosis.    7.           Back pain    8.           HTN.  9.           SOB.  When blood count low  10.         Anemia.  On Procrit    Plan:       1. Labs   2. Kenalog 80 mg IM.  Decrease back to prednisone 5 mg daily daily.  Continue to hold  MTX.   3. [employer] stopped tramadol.  Still on hydrocodone/acetaminophen from primary doctor.  [employer] stopped Flexeril so switch to tizanidine.  Risk of sedation discussed.   4. Following with nephrology  5. DEXA with osteopenia of hip total and femoral neck. FRAX does not suggest treatment however if prednisone goes to 7.5 mg or more will consider Prolia (due renal insufficiency).  Information provided for patient to review.  She read information but did not see dentist to have teeth pulled.  6.  Follow with Dr. Conner regarding spine issues.                   RTO in 3-4 months.    Patient seen face to face for 25 minutes and greater than 50% spent in counseling regarding Joint pains,   management of sarcoidosis and pain.

## 2020-12-10 LAB — ACE SERPL-CCNC: 28 U/L (ref 16–85)

## 2020-12-11 ENCOUNTER — PATIENT MESSAGE (OUTPATIENT)
Dept: OTHER | Facility: OTHER | Age: 75
End: 2020-12-11

## 2020-12-17 ENCOUNTER — PATIENT MESSAGE (OUTPATIENT)
Dept: FAMILY MEDICINE | Facility: CLINIC | Age: 75
End: 2020-12-17

## 2020-12-23 ENCOUNTER — LAB VISIT (OUTPATIENT)
Dept: LAB | Facility: HOSPITAL | Age: 75
End: 2020-12-23
Attending: INTERNAL MEDICINE
Payer: MEDICARE

## 2020-12-23 DIAGNOSIS — N18.9 ANEMIA IN CHRONIC KIDNEY DISEASE, UNSPECIFIED CKD STAGE: ICD-10-CM

## 2020-12-23 DIAGNOSIS — N18.9 CHRONIC KIDNEY DISEASE, UNSPECIFIED CKD STAGE: ICD-10-CM

## 2020-12-23 DIAGNOSIS — D63.1 ANEMIA IN CHRONIC KIDNEY DISEASE, UNSPECIFIED CKD STAGE: ICD-10-CM

## 2020-12-23 LAB
BASOPHILS # BLD AUTO: 0.02 K/UL (ref 0–0.2)
BASOPHILS NFR BLD: 0.3 % (ref 0–1.9)
DIFFERENTIAL METHOD: ABNORMAL
EOSINOPHIL # BLD AUTO: 0 K/UL (ref 0–0.5)
EOSINOPHIL NFR BLD: 0.3 % (ref 0–8)
ERYTHROCYTE [DISTWIDTH] IN BLOOD BY AUTOMATED COUNT: 13.2 % (ref 11.5–14.5)
FERRITIN SERPL-MCNC: 234 NG/ML (ref 20–300)
HCT VFR BLD AUTO: 35.3 % (ref 37–48.5)
HGB BLD-MCNC: 10.9 G/DL (ref 12–16)
IMM GRANULOCYTES # BLD AUTO: 0.04 K/UL (ref 0–0.04)
IMM GRANULOCYTES NFR BLD AUTO: 0.6 % (ref 0–0.5)
IRON SERPL-MCNC: 89 UG/DL (ref 30–160)
LYMPHOCYTES # BLD AUTO: 1.5 K/UL (ref 1–4.8)
LYMPHOCYTES NFR BLD: 21.9 % (ref 18–48)
MCH RBC QN AUTO: 31.8 PG (ref 27–31)
MCHC RBC AUTO-ENTMCNC: 30.9 G/DL (ref 32–36)
MCV RBC AUTO: 103 FL (ref 82–98)
MONOCYTES # BLD AUTO: 0.5 K/UL (ref 0.3–1)
MONOCYTES NFR BLD: 7.9 % (ref 4–15)
NEUTROPHILS # BLD AUTO: 4.7 K/UL (ref 1.8–7.7)
NEUTROPHILS NFR BLD: 69 % (ref 38–73)
NRBC BLD-RTO: 0 /100 WBC
PLATELET # BLD AUTO: 272 K/UL (ref 150–350)
PMV BLD AUTO: 9.9 FL (ref 9.2–12.9)
RBC # BLD AUTO: 3.43 M/UL (ref 4–5.4)
SATURATED IRON: 22 % (ref 20–50)
TOTAL IRON BINDING CAPACITY: 410 UG/DL (ref 250–450)
TRANSFERRIN SERPL-MCNC: 277 MG/DL (ref 200–375)
WBC # BLD AUTO: 6.8 K/UL (ref 3.9–12.7)

## 2020-12-23 PROCEDURE — 36415 COLL VENOUS BLD VENIPUNCTURE: CPT | Mod: PO

## 2020-12-23 PROCEDURE — 82728 ASSAY OF FERRITIN: CPT

## 2020-12-23 PROCEDURE — 85025 COMPLETE CBC W/AUTO DIFF WBC: CPT

## 2020-12-23 PROCEDURE — 83540 ASSAY OF IRON: CPT

## 2020-12-28 ENCOUNTER — PATIENT MESSAGE (OUTPATIENT)
Dept: FAMILY MEDICINE | Facility: CLINIC | Age: 75
End: 2020-12-28

## 2020-12-28 ENCOUNTER — OFFICE VISIT (OUTPATIENT)
Dept: HEMATOLOGY/ONCOLOGY | Facility: CLINIC | Age: 75
End: 2020-12-28
Payer: MEDICARE

## 2020-12-28 VITALS
DIASTOLIC BLOOD PRESSURE: 76 MMHG | HEART RATE: 79 BPM | OXYGEN SATURATION: 97 % | BODY MASS INDEX: 19.64 KG/M2 | WEIGHT: 115.06 LBS | HEIGHT: 64 IN | SYSTOLIC BLOOD PRESSURE: 131 MMHG | TEMPERATURE: 99 F

## 2020-12-28 DIAGNOSIS — G89.4 CHRONIC PAIN SYNDROME: ICD-10-CM

## 2020-12-28 DIAGNOSIS — D63.1 ANEMIA IN CHRONIC KIDNEY DISEASE, UNSPECIFIED CKD STAGE: Primary | ICD-10-CM

## 2020-12-28 DIAGNOSIS — N18.9 CHRONIC KIDNEY DISEASE, UNSPECIFIED CKD STAGE: ICD-10-CM

## 2020-12-28 DIAGNOSIS — N18.9 ANEMIA IN CHRONIC KIDNEY DISEASE, UNSPECIFIED CKD STAGE: Primary | ICD-10-CM

## 2020-12-28 PROCEDURE — 99214 OFFICE O/P EST MOD 30 MIN: CPT | Mod: S$GLB,,, | Performed by: INTERNAL MEDICINE

## 2020-12-28 PROCEDURE — 1157F PR ADVANCE CARE PLAN OR EQUIV PRESENT IN MEDICAL RECORD: ICD-10-PCS | Mod: S$GLB,,, | Performed by: INTERNAL MEDICINE

## 2020-12-28 PROCEDURE — 1101F PR PT FALLS ASSESS DOC 0-1 FALLS W/OUT INJ PAST YR: ICD-10-PCS | Mod: CPTII,S$GLB,, | Performed by: INTERNAL MEDICINE

## 2020-12-28 PROCEDURE — 3078F PR MOST RECENT DIASTOLIC BLOOD PRESSURE < 80 MM HG: ICD-10-PCS | Mod: CPTII,S$GLB,, | Performed by: INTERNAL MEDICINE

## 2020-12-28 PROCEDURE — 1126F PR PAIN SEVERITY QUANTIFIED, NO PAIN PRESENT: ICD-10-PCS | Mod: S$GLB,,, | Performed by: INTERNAL MEDICINE

## 2020-12-28 PROCEDURE — 3288F PR FALLS RISK ASSESSMENT DOCUMENTED: ICD-10-PCS | Mod: CPTII,S$GLB,, | Performed by: INTERNAL MEDICINE

## 2020-12-28 PROCEDURE — 3075F PR MOST RECENT SYSTOLIC BLOOD PRESS GE 130-139MM HG: ICD-10-PCS | Mod: CPTII,S$GLB,, | Performed by: INTERNAL MEDICINE

## 2020-12-28 PROCEDURE — 99999 PR PBB SHADOW E&M-EST. PATIENT-LVL V: CPT | Mod: PBBFAC,,, | Performed by: INTERNAL MEDICINE

## 2020-12-28 PROCEDURE — 3288F FALL RISK ASSESSMENT DOCD: CPT | Mod: CPTII,S$GLB,, | Performed by: INTERNAL MEDICINE

## 2020-12-28 PROCEDURE — 99214 PR OFFICE/OUTPT VISIT, EST, LEVL IV, 30-39 MIN: ICD-10-PCS | Mod: S$GLB,,, | Performed by: INTERNAL MEDICINE

## 2020-12-28 PROCEDURE — 1159F MED LIST DOCD IN RCRD: CPT | Mod: S$GLB,,, | Performed by: INTERNAL MEDICINE

## 2020-12-28 PROCEDURE — 1126F AMNT PAIN NOTED NONE PRSNT: CPT | Mod: S$GLB,,, | Performed by: INTERNAL MEDICINE

## 2020-12-28 PROCEDURE — 3078F DIAST BP <80 MM HG: CPT | Mod: CPTII,S$GLB,, | Performed by: INTERNAL MEDICINE

## 2020-12-28 PROCEDURE — 99999 PR PBB SHADOW E&M-EST. PATIENT-LVL V: ICD-10-PCS | Mod: PBBFAC,,, | Performed by: INTERNAL MEDICINE

## 2020-12-28 PROCEDURE — 3075F SYST BP GE 130 - 139MM HG: CPT | Mod: CPTII,S$GLB,, | Performed by: INTERNAL MEDICINE

## 2020-12-28 PROCEDURE — 1101F PT FALLS ASSESS-DOCD LE1/YR: CPT | Mod: CPTII,S$GLB,, | Performed by: INTERNAL MEDICINE

## 2020-12-28 PROCEDURE — 1157F ADVNC CARE PLAN IN RCRD: CPT | Mod: S$GLB,,, | Performed by: INTERNAL MEDICINE

## 2020-12-28 PROCEDURE — 1159F PR MEDICATION LIST DOCUMENTED IN MEDICAL RECORD: ICD-10-PCS | Mod: S$GLB,,, | Performed by: INTERNAL MEDICINE

## 2020-12-28 NOTE — PROGRESS NOTES
Subjective:        Patient ID: Regino Lawrence is a 75 y.o. female.    Chief Complaint: Follow-up anemia      Diagnosis: Anemia in CKD  Patient is a [employer]  .    The patient is seen today via televisit for chronic anemia in CKD. The patient reports that she has been diagnosed with JOLLY in the past.  She has been on oral iron supplementation therapy, but could not tolerate or did not respond, she is uncertain.  She is followed by GI and has undergone a colonoscopy earlier this year and was diagnosed with hemorrhoids for which she underwent banding procedure.  No melena, hematochezia,change in bowel habits.  She has also been diagnosed with B12 deficiency in the past, but reports she did not respond to B12 injections. No history of blood transfusions.  She is a [employer].  She reports that she remembers getting injections in the 1970s when her blood count was low.        She is followed by Rheumatology for history of sarcoidosis with associated myopathy and arthropathy.   .She has been treated in the  past with methotrexate and Plaquenil, both of which were ineffective  Cellcept and imuran caused some unknown side effect.   Colchicine  held due to low WBC     She is undergoing epo inj q 2wks  Hb 10.9 g/dL      She continues with Chronic diffuse arthralgias-stable   Followed by pain management and Rheumatology   Chronic Mild fatigue  Mild FAUSTIN-stable  No CP or cough   No melena, hematochezia or change in bowel habits    She also has  undergone intermittent IV iron therapy    She also has history of cervical spinal stenosis s/p posterior C3-C7 laminectomy and fusion on 11/16/15 by Dr. Conner.      CBC   reveals wbc 6880/mm3  Hb 10.9 g/dl Hct 35.3 % Plt ct 272      She is followed by PCP for DM  DM well -controlled  Hba1c  6.0% on 5/22/2020         PAST MEDICAL HISTORY:  Acid reflux, alopecia, anemia, anxiety disorder, chronic  kidney disease, depression, diabetes mellitus type 2, hyperlipidemia,   "hypertension, hypothyroidism, osteoporosis, sarcoidosis.    PAST SURGICAL HISTORY:  Cholecystectomy, , tubal ligation, carpal tunnel release, cataracts.    FAMILY HISTORY: Unremarkable for cancer. Significant for HTN.       Review of Systems   Constitutional: Positive for fatigue. Negative for activity change and appetite change.   HENT: Negative for hearing loss and nosebleeds.    Eyes: Negative for visual disturbance.   Respiratory: Positive for shortness of breath (Mild FAUSTIN). Negative for cough.    Cardiovascular: Negative for chest pain and leg swelling.   Gastrointestinal: Negative for abdominal pain, constipation, diarrhea and nausea.   Genitourinary: Negative for flank pain and urgency.   Musculoskeletal: Positive for arthralgias and back pain. Negative for gait problem and joint swelling.   Skin: Negative for rash.        No petechiae, ecchymoses   Neurological: Negative for light-headedness and headaches.   Hematological: Negative for adenopathy. Does not bruise/bleed easily.       Objective:       Vitals:    20 1103   BP: 131/76   BP Location: Right arm   Patient Position: Sitting   BP Method: Medium (Automatic)   Pulse: 79   Temp: 98.8 °F (37.1 °C)   TempSrc: Oral   SpO2: 97%   Weight: 52.2 kg (115 lb 1.3 oz)   Height: 5' 4" (1.626 m)         .Physical Exam   Constitutional: She is oriented to person, place, and time. She appears well-developed and well-nourished.   HENT:   Head: Normocephalic.   Eyes: Conjunctivae and lids are normal.No scleral icterus.   Neck: Normal range of motion. Neck supple. No thyromegaly present.   Cardiovascular: Normal rate, regular rhythm and normal heart sounds.    No murmur heard.  Pulmonary/Chest: Breath sounds normal. She has no wheezes. She has no rales.   Abdominal: Soft. Bowel sounds are normal. She exhibits no distension and no mass. There is no hepatosplenomegaly. There is no tenderness. There is no rebound and no guarding.   Musculoskeletal: Ambulates " w/assistance of walker   Neurological: She is alert and oriented to person, place, and time. No cranial nerve deficit.  Skin: Skin is warm and dry. No ecchymosis, no petechiae and no rash noted. No erythema.   Psychiatric: She has a normal mood and affect.               Lab Results   Component Value Date    WBC 6.80 12/23/2020    HGB 10.9 (L) 12/23/2020    HCT 35.3 (L) 12/23/2020     (H) 12/23/2020     12/23/2020     Lab Results   Component Value Date    IRON 89 12/23/2020    TIBC 410 12/23/2020    FERRITIN 234 12/23/2020     SPEP-nl      CT renal 3/6/2017   IMPRESSION:  1.  No renal, ureteral or bladder calculi.  No hydronephrosis or ureterectasis.  2.  Poorly distended bladder.  Mild bladder wall prominence.  Mild cystitis cannot be   excluded.  3.  Moderate constipation.  Normal appendix      Lab Results   Component Value Date    IRON 89 12/23/2020    TIBC 410 12/23/2020    FERRITIN 234 12/23/2020     Lab Results   Component Value Date    WBC 6.80 12/23/2020    HGB 10.9 (L) 12/23/2020    HCT 35.3 (L) 12/23/2020     (H) 12/23/2020     12/23/2020         Assessment:       1. Anemia in chronic kidney disease, unspecified CKD stage    2. Chronic kidney disease, unspecified CKD stage    3. Patient is Worship    4. Chronic pain syndrome        Plan:   1-3   Pt clinically stable  Pt is a [employer]ze and declines/not interested in blood and blood products due to Jain beliefs  CBC   reveals wbc 6880/mm3  Hb 10.9 g/dl Hct 35.3 % Plt ct 272      Ferritin 234  Fe sats 22    Pt followed by Nephrology . It has been determined early CKD stage III, suspect due to age-related renal nephron loss, along with possible diabetic nephropathy v. hypertensive nephrosclerosis    CBC q2wks STANDING  Cont EPO  inj q 2wks( pending lab parameters)    Continue periodic monitoring of Fe studies    4. Followed by pain mgmt    Follow-up with PCP for med mgmt    CBC q2wks STANDING  Cont EPO  inj q  2wks( pending lab parameters)      F/u 2 mos with Fe studies    Advance Care Planning     Power of   I previously initiated the process of advance care planning today and explained the importance of this process to the patient.  I introduced the concept of advance directives to the patient, as well. Then the patient received detailed information about the importance of designating a Health Care Power of  (HCPOA). She was also instructed to communicate with this person about their wishes for future healthcare, should she become sick and lose decision-making capacity. The patient has  previously appointed a HCPOA. Pt completed in 2015           CC: Micaela Mendoza M.D.

## 2021-01-04 ENCOUNTER — PATIENT MESSAGE (OUTPATIENT)
Dept: ADMINISTRATIVE | Facility: HOSPITAL | Age: 76
End: 2021-01-04

## 2021-01-06 ENCOUNTER — OFFICE VISIT (OUTPATIENT)
Dept: OPHTHALMOLOGY | Facility: CLINIC | Age: 76
End: 2021-01-06
Attending: OPHTHALMOLOGY
Payer: MEDICARE

## 2021-01-06 DIAGNOSIS — H34.8120 HEMISPHERIC RETINAL VEIN OCCLUSION WITH MACULAR EDEMA OF LEFT EYE: Primary | ICD-10-CM

## 2021-01-06 PROCEDURE — 3288F FALL RISK ASSESSMENT DOCD: CPT | Mod: CPTII,S$GLB,, | Performed by: OPHTHALMOLOGY

## 2021-01-06 PROCEDURE — 99999 PR PBB SHADOW E&M-EST. PATIENT-LVL III: ICD-10-PCS | Mod: PBBFAC,,, | Performed by: OPHTHALMOLOGY

## 2021-01-06 PROCEDURE — 1157F ADVNC CARE PLAN IN RCRD: CPT | Mod: S$GLB,,, | Performed by: OPHTHALMOLOGY

## 2021-01-06 PROCEDURE — 1126F PR PAIN SEVERITY QUANTIFIED, NO PAIN PRESENT: ICD-10-PCS | Mod: S$GLB,,, | Performed by: OPHTHALMOLOGY

## 2021-01-06 PROCEDURE — 1101F PR PT FALLS ASSESS DOC 0-1 FALLS W/OUT INJ PAST YR: ICD-10-PCS | Mod: CPTII,S$GLB,, | Performed by: OPHTHALMOLOGY

## 2021-01-06 PROCEDURE — 99999 PR PBB SHADOW E&M-EST. PATIENT-LVL III: CPT | Mod: PBBFAC,,, | Performed by: OPHTHALMOLOGY

## 2021-01-06 PROCEDURE — 1101F PT FALLS ASSESS-DOCD LE1/YR: CPT | Mod: CPTII,S$GLB,, | Performed by: OPHTHALMOLOGY

## 2021-01-06 PROCEDURE — 1126F AMNT PAIN NOTED NONE PRSNT: CPT | Mod: S$GLB,,, | Performed by: OPHTHALMOLOGY

## 2021-01-06 PROCEDURE — 92014 COMPRE OPH EXAM EST PT 1/>: CPT | Mod: S$GLB,,, | Performed by: OPHTHALMOLOGY

## 2021-01-06 PROCEDURE — 92134 POSTERIOR SEGMENT OCT RETINA (OCULAR COHERENCE TOMOGRAPHY)-BOTH EYES: ICD-10-PCS | Mod: S$GLB,,, | Performed by: OPHTHALMOLOGY

## 2021-01-06 PROCEDURE — 1157F PR ADVANCE CARE PLAN OR EQUIV PRESENT IN MEDICAL RECORD: ICD-10-PCS | Mod: S$GLB,,, | Performed by: OPHTHALMOLOGY

## 2021-01-06 PROCEDURE — 92134 CPTRZ OPH DX IMG PST SGM RTA: CPT | Mod: S$GLB,,, | Performed by: OPHTHALMOLOGY

## 2021-01-06 PROCEDURE — 3288F PR FALLS RISK ASSESSMENT DOCUMENTED: ICD-10-PCS | Mod: CPTII,S$GLB,, | Performed by: OPHTHALMOLOGY

## 2021-01-06 PROCEDURE — 92014 PR EYE EXAM, EST PATIENT,COMPREHESV: ICD-10-PCS | Mod: S$GLB,,, | Performed by: OPHTHALMOLOGY

## 2021-01-09 ENCOUNTER — IMMUNIZATION (OUTPATIENT)
Dept: INTERNAL MEDICINE | Facility: CLINIC | Age: 76
End: 2021-01-09
Payer: MEDICARE

## 2021-01-09 DIAGNOSIS — Z23 NEED FOR VACCINATION: ICD-10-CM

## 2021-01-09 PROCEDURE — 91300 COVID-19, MRNA, LNP-S, PF, 30 MCG/0.3 ML DOSE VACCINE: CPT | Mod: PBBFAC | Performed by: INTERNAL MEDICINE

## 2021-01-14 ENCOUNTER — OFFICE VISIT (OUTPATIENT)
Dept: FAMILY MEDICINE | Facility: CLINIC | Age: 76
End: 2021-01-14
Payer: MEDICARE

## 2021-01-14 VITALS
HEIGHT: 64 IN | TEMPERATURE: 98 F | DIASTOLIC BLOOD PRESSURE: 70 MMHG | HEART RATE: 83 BPM | WEIGHT: 118.94 LBS | OXYGEN SATURATION: 99 % | BODY MASS INDEX: 20.31 KG/M2 | SYSTOLIC BLOOD PRESSURE: 148 MMHG

## 2021-01-14 DIAGNOSIS — M46.1 SACROILIITIS: ICD-10-CM

## 2021-01-14 DIAGNOSIS — D84.9 IMMUNOSUPPRESSION: ICD-10-CM

## 2021-01-14 DIAGNOSIS — M51.36 DEGENERATIVE DISC DISEASE, LUMBAR: ICD-10-CM

## 2021-01-14 DIAGNOSIS — I70.0 CALCIFICATION OF AORTA: ICD-10-CM

## 2021-01-14 DIAGNOSIS — F11.90 CHRONIC, CONTINUOUS USE OF OPIOIDS: Primary | ICD-10-CM

## 2021-01-14 DIAGNOSIS — E11.3292 CONTROLLED TYPE 2 DIABETES MELLITUS WITH LEFT EYE AFFECTED BY MILD NONPROLIFERATIVE RETINOPATHY WITHOUT MACULAR EDEMA, WITHOUT LONG-TERM CURRENT USE OF INSULIN: ICD-10-CM

## 2021-01-14 DIAGNOSIS — I10 ESSENTIAL HYPERTENSION: Chronic | ICD-10-CM

## 2021-01-14 PROCEDURE — 3077F SYST BP >= 140 MM HG: CPT | Mod: CPTII,S$GLB,, | Performed by: FAMILY MEDICINE

## 2021-01-14 PROCEDURE — 3078F DIAST BP <80 MM HG: CPT | Mod: CPTII,S$GLB,, | Performed by: FAMILY MEDICINE

## 2021-01-14 PROCEDURE — 3044F HG A1C LEVEL LT 7.0%: CPT | Mod: CPTII,S$GLB,, | Performed by: FAMILY MEDICINE

## 2021-01-14 PROCEDURE — 3044F PR MOST RECENT HEMOGLOBIN A1C LEVEL <7.0%: ICD-10-PCS | Mod: CPTII,S$GLB,, | Performed by: FAMILY MEDICINE

## 2021-01-14 PROCEDURE — 1126F AMNT PAIN NOTED NONE PRSNT: CPT | Mod: S$GLB,,, | Performed by: FAMILY MEDICINE

## 2021-01-14 PROCEDURE — 3288F FALL RISK ASSESSMENT DOCD: CPT | Mod: CPTII,S$GLB,, | Performed by: FAMILY MEDICINE

## 2021-01-14 PROCEDURE — 1159F PR MEDICATION LIST DOCUMENTED IN MEDICAL RECORD: ICD-10-PCS | Mod: S$GLB,,, | Performed by: FAMILY MEDICINE

## 2021-01-14 PROCEDURE — 1157F PR ADVANCE CARE PLAN OR EQUIV PRESENT IN MEDICAL RECORD: ICD-10-PCS | Mod: S$GLB,,, | Performed by: FAMILY MEDICINE

## 2021-01-14 PROCEDURE — 99214 OFFICE O/P EST MOD 30 MIN: CPT | Mod: S$GLB,,, | Performed by: FAMILY MEDICINE

## 2021-01-14 PROCEDURE — 99214 PR OFFICE/OUTPT VISIT, EST, LEVL IV, 30-39 MIN: ICD-10-PCS | Mod: S$GLB,,, | Performed by: FAMILY MEDICINE

## 2021-01-14 PROCEDURE — 1157F ADVNC CARE PLAN IN RCRD: CPT | Mod: S$GLB,,, | Performed by: FAMILY MEDICINE

## 2021-01-14 PROCEDURE — 99999 PR PBB SHADOW E&M-EST. PATIENT-LVL III: CPT | Mod: PBBFAC,,, | Performed by: FAMILY MEDICINE

## 2021-01-14 PROCEDURE — 1126F PR PAIN SEVERITY QUANTIFIED, NO PAIN PRESENT: ICD-10-PCS | Mod: S$GLB,,, | Performed by: FAMILY MEDICINE

## 2021-01-14 PROCEDURE — 3072F PR LOW RISK FOR RETINOPATHY: ICD-10-PCS | Mod: S$GLB,,, | Performed by: FAMILY MEDICINE

## 2021-01-14 PROCEDURE — 99999 PR PBB SHADOW E&M-EST. PATIENT-LVL III: ICD-10-PCS | Mod: PBBFAC,,, | Performed by: FAMILY MEDICINE

## 2021-01-14 PROCEDURE — 1101F PR PT FALLS ASSESS DOC 0-1 FALLS W/OUT INJ PAST YR: ICD-10-PCS | Mod: CPTII,S$GLB,, | Performed by: FAMILY MEDICINE

## 2021-01-14 PROCEDURE — 3078F PR MOST RECENT DIASTOLIC BLOOD PRESSURE < 80 MM HG: ICD-10-PCS | Mod: CPTII,S$GLB,, | Performed by: FAMILY MEDICINE

## 2021-01-14 PROCEDURE — 1159F MED LIST DOCD IN RCRD: CPT | Mod: S$GLB,,, | Performed by: FAMILY MEDICINE

## 2021-01-14 PROCEDURE — 1101F PT FALLS ASSESS-DOCD LE1/YR: CPT | Mod: CPTII,S$GLB,, | Performed by: FAMILY MEDICINE

## 2021-01-14 PROCEDURE — 3077F PR MOST RECENT SYSTOLIC BLOOD PRESSURE >= 140 MM HG: ICD-10-PCS | Mod: CPTII,S$GLB,, | Performed by: FAMILY MEDICINE

## 2021-01-14 PROCEDURE — 3288F PR FALLS RISK ASSESSMENT DOCUMENTED: ICD-10-PCS | Mod: CPTII,S$GLB,, | Performed by: FAMILY MEDICINE

## 2021-01-14 PROCEDURE — 3072F LOW RISK FOR RETINOPATHY: CPT | Mod: S$GLB,,, | Performed by: FAMILY MEDICINE

## 2021-01-14 RX ORDER — HYDROCODONE BITARTRATE AND ACETAMINOPHEN 5; 325 MG/1; MG/1
1 TABLET ORAL EVERY 6 HOURS PRN
Qty: 90 TABLET | Refills: 0 | Status: SHIPPED | OUTPATIENT
Start: 2021-01-14 | End: 2021-02-04 | Stop reason: SDUPTHER

## 2021-01-14 RX ORDER — HYDROCODONE BITARTRATE AND ACETAMINOPHEN 5; 325 MG/1; MG/1
1 TABLET ORAL EVERY 6 HOURS PRN
Qty: 90 TABLET | Refills: 0 | Status: ON HOLD | OUTPATIENT
Start: 2021-02-14 | End: 2021-03-13 | Stop reason: HOSPADM

## 2021-01-14 RX ORDER — HYDROCODONE BITARTRATE AND ACETAMINOPHEN 5; 325 MG/1; MG/1
1 TABLET ORAL EVERY 6 HOURS PRN
Qty: 90 TABLET | Refills: 0 | Status: ON HOLD | OUTPATIENT
Start: 2021-03-14 | End: 2021-03-13 | Stop reason: HOSPADM

## 2021-01-14 NOTE — TELEPHONE ENCOUNTER
----- Message from Clemencia Quevedo sent at 8/1/2018 11:12 AM CDT -----  Contact: [phone]/Pt  Calling To speak with nurse regarding tylenol 3 script that [facility] /[facility] ,say that haven't received yet. Pt states pharmacy states a reason for script also have to be sent as well. Please call Pt when sent   Negative

## 2021-01-15 ENCOUNTER — INFUSION (OUTPATIENT)
Dept: INFUSION THERAPY | Facility: HOSPITAL | Age: 76
End: 2021-01-15
Attending: INTERNAL MEDICINE
Payer: MEDICARE

## 2021-01-15 VITALS
TEMPERATURE: 98 F | OXYGEN SATURATION: 99 % | HEART RATE: 72 BPM | RESPIRATION RATE: 18 BRPM | SYSTOLIC BLOOD PRESSURE: 143 MMHG | DIASTOLIC BLOOD PRESSURE: 72 MMHG

## 2021-01-15 DIAGNOSIS — D50.9 IRON DEFICIENCY ANEMIA, UNSPECIFIED IRON DEFICIENCY ANEMIA TYPE: ICD-10-CM

## 2021-01-15 DIAGNOSIS — N18.9 ANEMIA IN CHRONIC KIDNEY DISEASE, UNSPECIFIED CKD STAGE: Primary | ICD-10-CM

## 2021-01-15 DIAGNOSIS — D63.1 ANEMIA IN CHRONIC KIDNEY DISEASE, UNSPECIFIED CKD STAGE: Primary | ICD-10-CM

## 2021-01-15 DIAGNOSIS — Z53.1 REFUSAL OF BLOOD TRANSFUSIONS AS PATIENT IS JEHOVAH'S WITNESS: ICD-10-CM

## 2021-01-15 PROCEDURE — 63600175 PHARM REV CODE 636 W HCPCS: Performed by: INTERNAL MEDICINE

## 2021-01-15 PROCEDURE — 96372 THER/PROPH/DIAG INJ SC/IM: CPT

## 2021-01-15 RX ADMIN — EPOETIN ALFA-EPBX 20000 UNITS: 20000 INJECTION, SOLUTION INTRAVENOUS; SUBCUTANEOUS at 12:01

## 2021-01-29 ENCOUNTER — INFUSION (OUTPATIENT)
Dept: INFUSION THERAPY | Facility: HOSPITAL | Age: 76
End: 2021-01-29
Attending: INTERNAL MEDICINE
Payer: MEDICARE

## 2021-01-29 VITALS
OXYGEN SATURATION: 100 % | RESPIRATION RATE: 18 BRPM | TEMPERATURE: 98 F | HEART RATE: 73 BPM | DIASTOLIC BLOOD PRESSURE: 88 MMHG | SYSTOLIC BLOOD PRESSURE: 169 MMHG

## 2021-01-29 DIAGNOSIS — N18.9 ANEMIA IN CHRONIC KIDNEY DISEASE, UNSPECIFIED CKD STAGE: Primary | ICD-10-CM

## 2021-01-29 DIAGNOSIS — D50.9 IRON DEFICIENCY ANEMIA, UNSPECIFIED IRON DEFICIENCY ANEMIA TYPE: ICD-10-CM

## 2021-01-29 DIAGNOSIS — D63.1 ANEMIA IN CHRONIC KIDNEY DISEASE, UNSPECIFIED CKD STAGE: Primary | ICD-10-CM

## 2021-01-29 DIAGNOSIS — Z53.1 REFUSAL OF BLOOD TRANSFUSIONS AS PATIENT IS JEHOVAH'S WITNESS: ICD-10-CM

## 2021-01-29 PROCEDURE — 63600175 PHARM REV CODE 636 W HCPCS: Mod: HCNC | Performed by: INTERNAL MEDICINE

## 2021-01-29 PROCEDURE — 96372 THER/PROPH/DIAG INJ SC/IM: CPT | Mod: HCNC

## 2021-01-29 RX ADMIN — EPOETIN ALFA-EPBX 20000 UNITS: 20000 INJECTION, SOLUTION INTRAVENOUS; SUBCUTANEOUS at 12:01

## 2021-01-30 ENCOUNTER — IMMUNIZATION (OUTPATIENT)
Dept: INTERNAL MEDICINE | Facility: CLINIC | Age: 76
End: 2021-01-30
Payer: MEDICARE

## 2021-01-30 DIAGNOSIS — Z23 NEED FOR VACCINATION: Primary | ICD-10-CM

## 2021-01-30 PROCEDURE — 0002A PR IMMUNIZ ADMIN, SARS-COV-2 COVID-19 VACC, 30MCG/0.3ML, 2ND DOSE: CPT | Mod: PBBFAC | Performed by: INTERNAL MEDICINE

## 2021-01-30 PROCEDURE — 91300 PR SARS-COV- 2 COVID-19 VACCINE, NO PRSV, 30MCG/0.3ML, IM: CPT | Mod: PBBFAC | Performed by: INTERNAL MEDICINE

## 2021-01-30 RX ADMIN — RNA INGREDIENT BNT-162B2 0.3 ML: 0.23 INJECTION, SUSPENSION INTRAMUSCULAR at 10:01

## 2021-02-12 ENCOUNTER — INFUSION (OUTPATIENT)
Dept: INFUSION THERAPY | Facility: HOSPITAL | Age: 76
End: 2021-02-12
Attending: INTERNAL MEDICINE
Payer: MEDICARE

## 2021-02-12 VITALS
SYSTOLIC BLOOD PRESSURE: 139 MMHG | DIASTOLIC BLOOD PRESSURE: 67 MMHG | HEART RATE: 76 BPM | OXYGEN SATURATION: 97 % | TEMPERATURE: 98 F | RESPIRATION RATE: 17 BRPM

## 2021-02-12 DIAGNOSIS — Z53.1 REFUSAL OF BLOOD TRANSFUSIONS AS PATIENT IS JEHOVAH'S WITNESS: ICD-10-CM

## 2021-02-12 DIAGNOSIS — N18.9 ANEMIA IN CHRONIC KIDNEY DISEASE, UNSPECIFIED CKD STAGE: Primary | ICD-10-CM

## 2021-02-12 DIAGNOSIS — D63.1 ANEMIA IN CHRONIC KIDNEY DISEASE, UNSPECIFIED CKD STAGE: Primary | ICD-10-CM

## 2021-02-12 DIAGNOSIS — D50.9 IRON DEFICIENCY ANEMIA, UNSPECIFIED IRON DEFICIENCY ANEMIA TYPE: ICD-10-CM

## 2021-02-12 PROCEDURE — 63600175 PHARM REV CODE 636 W HCPCS: Mod: HCNC | Performed by: INTERNAL MEDICINE

## 2021-02-12 PROCEDURE — 96372 THER/PROPH/DIAG INJ SC/IM: CPT | Mod: HCNC

## 2021-02-12 RX ADMIN — EPOETIN ALFA-EPBX 20000 UNITS: 20000 INJECTION, SOLUTION INTRAVENOUS; SUBCUTANEOUS at 12:02

## 2021-02-23 ENCOUNTER — OFFICE VISIT (OUTPATIENT)
Dept: URGENT CARE | Facility: CLINIC | Age: 76
End: 2021-02-23
Payer: MEDICARE

## 2021-02-23 ENCOUNTER — HOSPITAL ENCOUNTER (INPATIENT)
Facility: HOSPITAL | Age: 76
LOS: 2 days | Discharge: HOME | DRG: 682 | End: 2021-02-25
Attending: EMERGENCY MEDICINE | Admitting: STUDENT IN AN ORGANIZED HEALTH CARE EDUCATION/TRAINING PROGRAM
Payer: MEDICARE

## 2021-02-23 ENCOUNTER — PATIENT MESSAGE (OUTPATIENT)
Dept: RHEUMATOLOGY | Facility: CLINIC | Age: 76
End: 2021-02-23

## 2021-02-23 VITALS
HEIGHT: 64 IN | WEIGHT: 118 LBS | BODY MASS INDEX: 20.14 KG/M2 | DIASTOLIC BLOOD PRESSURE: 70 MMHG | OXYGEN SATURATION: 97 % | HEART RATE: 85 BPM | SYSTOLIC BLOOD PRESSURE: 103 MMHG

## 2021-02-23 DIAGNOSIS — R79.89 ELEVATED TROPONIN: ICD-10-CM

## 2021-02-23 DIAGNOSIS — R79.89 ELEVATED LACTIC ACID LEVEL: ICD-10-CM

## 2021-02-23 DIAGNOSIS — R41.82 ALTERED MENTAL STATUS, UNSPECIFIED ALTERED MENTAL STATUS TYPE: ICD-10-CM

## 2021-02-23 DIAGNOSIS — R11.2 NAUSEA AND VOMITING, INTRACTABILITY OF VOMITING NOT SPECIFIED, UNSPECIFIED VOMITING TYPE: ICD-10-CM

## 2021-02-23 DIAGNOSIS — R10.84 GENERALIZED ABDOMINAL PAIN: Primary | ICD-10-CM

## 2021-02-23 DIAGNOSIS — R41.82 ALTERED MENTAL STATUS: Primary | ICD-10-CM

## 2021-02-23 DIAGNOSIS — A41.9 SEPSIS, DUE TO UNSPECIFIED ORGANISM, UNSPECIFIED WHETHER ACUTE ORGAN DYSFUNCTION PRESENT: ICD-10-CM

## 2021-02-23 DIAGNOSIS — R07.9 CHEST PAIN: ICD-10-CM

## 2021-02-23 DIAGNOSIS — N17.9 ACUTE KIDNEY INJURY: ICD-10-CM

## 2021-02-23 LAB
ALBUMIN SERPL BCP-MCNC: 3.8 G/DL (ref 3.5–5.2)
ALP SERPL-CCNC: 72 U/L (ref 55–135)
ALT SERPL W/O P-5'-P-CCNC: 16 U/L (ref 10–44)
AMMONIA PLAS-SCNC: 32 UMOL/L (ref 10–50)
ANION GAP SERPL CALC-SCNC: 15 MMOL/L (ref 8–16)
AST SERPL-CCNC: 20 U/L (ref 10–40)
BACTERIA #/AREA URNS HPF: NORMAL /HPF
BASOPHILS # BLD AUTO: 0.02 K/UL (ref 0–0.2)
BASOPHILS NFR BLD: 0.2 % (ref 0–1.9)
BILIRUB SERPL-MCNC: 0.6 MG/DL (ref 0.1–1)
BILIRUB UR QL STRIP: NEGATIVE
BNP SERPL-MCNC: 142 PG/ML (ref 0–99)
BUN SERPL-MCNC: 15 MG/DL (ref 8–23)
CALCIUM SERPL-MCNC: 8.2 MG/DL (ref 8.7–10.5)
CHLORIDE SERPL-SCNC: 103 MMOL/L (ref 95–110)
CLARITY UR: CLEAR
CO2 SERPL-SCNC: 23 MMOL/L (ref 23–29)
COLOR UR: YELLOW
CREAT SERPL-MCNC: 1.9 MG/DL (ref 0.5–1.4)
CTP QC/QA: YES
CTP QC/QA: YES
DIFFERENTIAL METHOD: ABNORMAL
EOSINOPHIL # BLD AUTO: 0 K/UL (ref 0–0.5)
EOSINOPHIL NFR BLD: 0.2 % (ref 0–8)
ERYTHROCYTE [DISTWIDTH] IN BLOOD BY AUTOMATED COUNT: 13.8 % (ref 11.5–14.5)
EST. GFR  (AFRICAN AMERICAN): 29 ML/MIN/1.73 M^2
EST. GFR  (NON AFRICAN AMERICAN): 25 ML/MIN/1.73 M^2
GLUCOSE SERPL-MCNC: 117 MG/DL (ref 70–110)
GLUCOSE SERPL-MCNC: 261 MG/DL (ref 70–110)
GLUCOSE UR QL STRIP: ABNORMAL
HCT VFR BLD AUTO: 39.9 % (ref 37–48.5)
HGB BLD-MCNC: 13 G/DL (ref 12–16)
HGB UR QL STRIP: ABNORMAL
HYALINE CASTS #/AREA URNS LPF: 0 /LPF
IMM GRANULOCYTES # BLD AUTO: 0.06 K/UL (ref 0–0.04)
IMM GRANULOCYTES NFR BLD AUTO: 0.7 % (ref 0–0.5)
KETONES UR QL STRIP: NEGATIVE
LACTATE SERPL-SCNC: 3.6 MMOL/L (ref 0.5–2.2)
LACTATE SERPL-SCNC: 3.7 MMOL/L (ref 0.5–2.2)
LEUKOCYTE ESTERASE UR QL STRIP: NEGATIVE
LIPASE SERPL-CCNC: 65 U/L (ref 4–60)
LYMPHOCYTES # BLD AUTO: 1.2 K/UL (ref 1–4.8)
LYMPHOCYTES NFR BLD: 15.3 % (ref 18–48)
MCH RBC QN AUTO: 32.4 PG (ref 27–31)
MCHC RBC AUTO-ENTMCNC: 32.6 G/DL (ref 32–36)
MCV RBC AUTO: 100 FL (ref 82–98)
MICROSCOPIC COMMENT: NORMAL
MONOCYTES # BLD AUTO: 0.8 K/UL (ref 0.3–1)
MONOCYTES NFR BLD: 9.8 % (ref 4–15)
NEUTROPHILS # BLD AUTO: 6 K/UL (ref 1.8–7.7)
NEUTROPHILS NFR BLD: 73.8 % (ref 38–73)
NITRITE UR QL STRIP: NEGATIVE
NRBC BLD-RTO: 0 /100 WBC
PH UR STRIP: 8 [PH] (ref 5–8)
PLATELET # BLD AUTO: 300 K/UL (ref 150–350)
PMV BLD AUTO: 9.3 FL (ref 9.2–12.9)
POC ANION GAP: 11 MMOL/L (ref 10–20)
POC BUN: 19 MMOL/L (ref 8–26)
POC CHLORIDE: 95 MMOL/L (ref 98–109)
POC CREATININE: 1.2 MG/DL (ref 0.6–1.3)
POC HEMATOCRIT: 51 %PCV (ref 37–47)
POC HEMOGLOBIN: 17.3 G/DL (ref 12.5–16)
POC ICA: 1.06 MMOL/L (ref 1.12–1.32)
POC POTASSIUM: 6.5 MMOL/L (ref 3.5–4.9)
POC SODIUM: 136 MMOL/L (ref 138–146)
POC TCO2: 38 MMOL/L (ref 24–29)
POCT GLUCOSE: 118 MG/DL (ref 70–110)
POTASSIUM SERPL-SCNC: 3.3 MMOL/L (ref 3.5–5.1)
PROT SERPL-MCNC: 7.4 G/DL (ref 6–8.4)
PROT UR QL STRIP: ABNORMAL
RBC # BLD AUTO: 4.01 M/UL (ref 4–5.4)
RBC #/AREA URNS HPF: 2 /HPF (ref 0–4)
SARS-COV-2 RDRP RESP QL NAA+PROBE: NEGATIVE
SARS-COV-2 RDRP RESP QL NAA+PROBE: NEGATIVE
SODIUM SERPL-SCNC: 141 MMOL/L (ref 136–145)
SP GR UR STRIP: 1.02 (ref 1–1.03)
TROPONIN I SERPL DL<=0.01 NG/ML-MCNC: 0.04 NG/ML (ref 0–0.03)
TSH SERPL DL<=0.005 MIU/L-ACNC: 0.66 UIU/ML (ref 0.4–4)
URN SPEC COLLECT METH UR: ABNORMAL
UROBILINOGEN UR STRIP-ACNC: NEGATIVE EU/DL
WBC # BLD AUTO: 8.13 K/UL (ref 3.9–12.7)
WBC #/AREA URNS HPF: 1 /HPF (ref 0–5)
YEAST URNS QL MICRO: NORMAL

## 2021-02-23 PROCEDURE — 87040 BLOOD CULTURE FOR BACTERIA: CPT | Mod: 59

## 2021-02-23 PROCEDURE — 25500020 PHARM REV CODE 255: Performed by: EMERGENCY MEDICINE

## 2021-02-23 PROCEDURE — 83605 ASSAY OF LACTIC ACID: CPT

## 2021-02-23 PROCEDURE — 1157F ADVNC CARE PLAN IN RCRD: CPT | Mod: S$GLB,,, | Performed by: PHYSICIAN ASSISTANT

## 2021-02-23 PROCEDURE — 12000002 HC ACUTE/MED SURGE SEMI-PRIVATE ROOM

## 2021-02-23 PROCEDURE — P9612 CATHETERIZE FOR URINE SPEC: HCPCS

## 2021-02-23 PROCEDURE — 83690 ASSAY OF LIPASE: CPT

## 2021-02-23 PROCEDURE — 84145 PROCALCITONIN (PCT): CPT

## 2021-02-23 PROCEDURE — 80047 POCT CHEMISTRY PANEL: ICD-10-PCS | Mod: QW,S$GLB,, | Performed by: PHYSICIAN ASSISTANT

## 2021-02-23 PROCEDURE — 84443 ASSAY THYROID STIM HORMONE: CPT

## 2021-02-23 PROCEDURE — 96372 THER/PROPH/DIAG INJ SC/IM: CPT | Mod: S$GLB,,, | Performed by: PHYSICIAN ASSISTANT

## 2021-02-23 PROCEDURE — U0002 COVID-19 LAB TEST NON-CDC: HCPCS | Mod: QW,S$GLB,, | Performed by: PHYSICIAN ASSISTANT

## 2021-02-23 PROCEDURE — 96365 THER/PROPH/DIAG IV INF INIT: CPT

## 2021-02-23 PROCEDURE — 3072F LOW RISK FOR RETINOPATHY: CPT | Mod: S$GLB,,, | Performed by: PHYSICIAN ASSISTANT

## 2021-02-23 PROCEDURE — 25000003 PHARM REV CODE 250: Performed by: EMERGENCY MEDICINE

## 2021-02-23 PROCEDURE — 83880 ASSAY OF NATRIURETIC PEPTIDE: CPT

## 2021-02-23 PROCEDURE — 82962 GLUCOSE BLOOD TEST: CPT

## 2021-02-23 PROCEDURE — 1157F PR ADVANCE CARE PLAN OR EQUIV PRESENT IN MEDICAL RECORD: ICD-10-PCS | Mod: S$GLB,,, | Performed by: PHYSICIAN ASSISTANT

## 2021-02-23 PROCEDURE — 83735 ASSAY OF MAGNESIUM: CPT

## 2021-02-23 PROCEDURE — 84100 ASSAY OF PHOSPHORUS: CPT

## 2021-02-23 PROCEDURE — 80047 BASIC METABLC PNL IONIZED CA: CPT | Mod: QW,S$GLB,, | Performed by: PHYSICIAN ASSISTANT

## 2021-02-23 PROCEDURE — 84484 ASSAY OF TROPONIN QUANT: CPT

## 2021-02-23 PROCEDURE — 96372 PR INJECTION,THERAP/PROPH/DIAG2ST, IM OR SUBCUT: ICD-10-PCS | Mod: S$GLB,,, | Performed by: PHYSICIAN ASSISTANT

## 2021-02-23 PROCEDURE — 99285 EMERGENCY DEPT VISIT HI MDM: CPT | Mod: 25

## 2021-02-23 PROCEDURE — U0002: ICD-10-PCS | Mod: QW,S$GLB,, | Performed by: PHYSICIAN ASSISTANT

## 2021-02-23 PROCEDURE — 96361 HYDRATE IV INFUSION ADD-ON: CPT

## 2021-02-23 PROCEDURE — 99214 PR OFFICE/OUTPT VISIT, EST, LEVL IV, 30-39 MIN: ICD-10-PCS | Mod: 25,S$GLB,CS, | Performed by: PHYSICIAN ASSISTANT

## 2021-02-23 PROCEDURE — 82140 ASSAY OF AMMONIA: CPT

## 2021-02-23 PROCEDURE — U0002 COVID-19 LAB TEST NON-CDC: HCPCS | Performed by: EMERGENCY MEDICINE

## 2021-02-23 PROCEDURE — 99214 OFFICE O/P EST MOD 30 MIN: CPT | Mod: 25,S$GLB,CS, | Performed by: PHYSICIAN ASSISTANT

## 2021-02-23 PROCEDURE — 81000 URINALYSIS NONAUTO W/SCOPE: CPT

## 2021-02-23 PROCEDURE — 80053 COMPREHEN METABOLIC PANEL: CPT

## 2021-02-23 PROCEDURE — 85025 COMPLETE CBC W/AUTO DIFF WBC: CPT

## 2021-02-23 PROCEDURE — 63600175 PHARM REV CODE 636 W HCPCS: Performed by: EMERGENCY MEDICINE

## 2021-02-23 PROCEDURE — 3072F PR LOW RISK FOR RETINOPATHY: ICD-10-PCS | Mod: S$GLB,,, | Performed by: PHYSICIAN ASSISTANT

## 2021-02-23 RX ORDER — FOLIC ACID 1 MG/1
1000 TABLET ORAL DAILY
Status: DISCONTINUED | OUTPATIENT
Start: 2021-02-24 | End: 2021-02-25 | Stop reason: HOSPADM

## 2021-02-23 RX ORDER — SODIUM CHLORIDE AND POTASSIUM CHLORIDE 150; 900 MG/100ML; MG/100ML
INJECTION, SOLUTION INTRAVENOUS CONTINUOUS
Status: DISPENSED | OUTPATIENT
Start: 2021-02-24 | End: 2021-02-24

## 2021-02-23 RX ORDER — ATORVASTATIN CALCIUM 10 MG/1
20 TABLET, FILM COATED ORAL DAILY
Status: DISCONTINUED | OUTPATIENT
Start: 2021-02-24 | End: 2021-02-25 | Stop reason: HOSPADM

## 2021-02-23 RX ORDER — CALCIUM CARBONATE 500(1250)
500 TABLET ORAL 2 TIMES DAILY
Status: DISCONTINUED | OUTPATIENT
Start: 2021-02-24 | End: 2021-02-25 | Stop reason: HOSPADM

## 2021-02-23 RX ORDER — GABAPENTIN 100 MG/1
100 CAPSULE ORAL 3 TIMES DAILY
Status: DISCONTINUED | OUTPATIENT
Start: 2021-02-24 | End: 2021-02-25 | Stop reason: HOSPADM

## 2021-02-23 RX ORDER — CARVEDILOL 12.5 MG/1
25 TABLET ORAL 2 TIMES DAILY
Status: DISCONTINUED | OUTPATIENT
Start: 2021-02-24 | End: 2021-02-25 | Stop reason: HOSPADM

## 2021-02-23 RX ORDER — LEVOTHYROXINE SODIUM 50 UG/1
50 TABLET ORAL
Status: DISCONTINUED | OUTPATIENT
Start: 2021-02-24 | End: 2021-02-25 | Stop reason: HOSPADM

## 2021-02-23 RX ORDER — PREDNISONE 5 MG/1
5 TABLET ORAL DAILY
Status: DISCONTINUED | OUTPATIENT
Start: 2021-02-24 | End: 2021-02-25 | Stop reason: HOSPADM

## 2021-02-23 RX ORDER — ONDANSETRON 2 MG/ML
4 INJECTION INTRAMUSCULAR; INTRAVENOUS
Status: COMPLETED | OUTPATIENT
Start: 2021-02-23 | End: 2021-02-23

## 2021-02-23 RX ORDER — POTASSIUM CHLORIDE 20 MEQ/1
20 TABLET, EXTENDED RELEASE ORAL ONCE
Status: COMPLETED | OUTPATIENT
Start: 2021-02-24 | End: 2021-02-24

## 2021-02-23 RX ORDER — NIFEDIPINE 30 MG/1
30 TABLET, EXTENDED RELEASE ORAL DAILY
Status: DISCONTINUED | OUTPATIENT
Start: 2021-02-24 | End: 2021-02-25 | Stop reason: HOSPADM

## 2021-02-23 RX ORDER — SODIUM CHLORIDE 9 MG/ML
INJECTION, SOLUTION INTRAVENOUS CONTINUOUS
Status: DISCONTINUED | OUTPATIENT
Start: 2021-02-23 | End: 2021-07-08

## 2021-02-23 RX ADMIN — ONDANSETRON 4 MG: 2 INJECTION INTRAMUSCULAR; INTRAVENOUS at 02:02

## 2021-02-23 RX ADMIN — PIPERACILLIN AND TAZOBACTAM 4.5 G: 4; .5 INJECTION, POWDER, LYOPHILIZED, FOR SOLUTION INTRAVENOUS; PARENTERAL at 08:02

## 2021-02-23 RX ADMIN — IOHEXOL 50 ML: 350 INJECTION, SOLUTION INTRAVENOUS at 08:02

## 2021-02-23 RX ADMIN — SODIUM CHLORIDE 1770 ML: 0.9 INJECTION, SOLUTION INTRAVENOUS at 08:02

## 2021-02-24 PROBLEM — E83.42 HYPOMAGNESEMIA: Status: ACTIVE | Noted: 2021-02-24

## 2021-02-24 PROBLEM — N17.9 AKI (ACUTE KIDNEY INJURY): Status: ACTIVE | Noted: 2021-02-24

## 2021-02-24 PROBLEM — N17.9 AKI (ACUTE KIDNEY INJURY): Status: RESOLVED | Noted: 2021-02-24 | Resolved: 2021-02-24

## 2021-02-24 PROBLEM — R11.2 NAUSEA AND VOMITING: Status: RESOLVED | Noted: 2021-02-24 | Resolved: 2021-02-24

## 2021-02-24 PROBLEM — D75.89 MACROCYTOSIS WITHOUT ANEMIA: Status: ACTIVE | Noted: 2021-02-24

## 2021-02-24 PROBLEM — E83.51 HYPOCALCEMIA: Status: ACTIVE | Noted: 2021-02-24

## 2021-02-24 PROBLEM — G93.41 ENCEPHALOPATHY, METABOLIC: Status: RESOLVED | Noted: 2021-02-23 | Resolved: 2021-02-24

## 2021-02-24 PROBLEM — G93.41 ENCEPHALOPATHY, METABOLIC: Status: ACTIVE | Noted: 2021-02-23

## 2021-02-24 PROBLEM — R11.2 NAUSEA AND VOMITING: Status: ACTIVE | Noted: 2021-02-24

## 2021-02-24 PROBLEM — E83.51 HYPOCALCEMIA: Status: RESOLVED | Noted: 2021-02-24 | Resolved: 2021-02-24

## 2021-02-24 PROBLEM — R79.89 ELEVATED TROPONIN: Status: ACTIVE | Noted: 2021-02-24

## 2021-02-24 PROBLEM — E83.42 HYPOMAGNESEMIA: Status: RESOLVED | Noted: 2021-02-24 | Resolved: 2021-02-24

## 2021-02-24 PROBLEM — R79.89 ELEVATED TROPONIN: Status: RESOLVED | Noted: 2021-02-24 | Resolved: 2021-02-24

## 2021-02-24 PROBLEM — E87.20 LACTIC ACIDOSIS: Status: ACTIVE | Noted: 2021-02-24

## 2021-02-24 LAB
25(OH)D3+25(OH)D2 SERPL-MCNC: 9 NG/ML (ref 30–96)
ALBUMIN SERPL BCP-MCNC: 2.9 G/DL (ref 3.5–5.2)
ALP SERPL-CCNC: 47 U/L (ref 55–135)
ALT SERPL W/O P-5'-P-CCNC: 12 U/L (ref 10–44)
ANION GAP SERPL CALC-SCNC: 10 MMOL/L (ref 8–16)
AORTIC ROOT ANNULUS: 2.92 CM
AORTIC VALVE CUSP SEPERATION: 1.73 CM
ASCENDING AORTA: 2.51 CM
AST SERPL-CCNC: 16 U/L (ref 10–40)
AV INDEX (PROSTH): 0.92
AV MEAN GRADIENT: 4 MMHG
AV PEAK GRADIENT: 6 MMHG
AV VALVE AREA: 2.54 CM2
AV VELOCITY RATIO: 0.79
BASOPHILS # BLD AUTO: 0.04 K/UL (ref 0–0.2)
BASOPHILS NFR BLD: 0.5 % (ref 0–1.9)
BILIRUB SERPL-MCNC: 0.5 MG/DL (ref 0.1–1)
BSA FOR ECHO PROCEDURE: 1.61 M2
BUN SERPL-MCNC: 16 MG/DL (ref 8–23)
CALCIUM SERPL-MCNC: 7.8 MG/DL (ref 8.7–10.5)
CHLORIDE SERPL-SCNC: 109 MMOL/L (ref 95–110)
CK SERPL-CCNC: 142 U/L (ref 20–180)
CO2 SERPL-SCNC: 20 MMOL/L (ref 23–29)
CREAT SERPL-MCNC: 1.7 MG/DL (ref 0.5–1.4)
CV ECHO LV RWT: 0.76 CM
DIFFERENTIAL METHOD: ABNORMAL
DOP CALC AO PEAK VEL: 1.27 M/S
DOP CALC AO VTI: 28.5 CM
DOP CALC LVOT AREA: 2.8 CM2
DOP CALC LVOT DIAMETER: 1.88 CM
DOP CALC LVOT PEAK VEL: 1 M/S
DOP CALC LVOT STROKE VOLUME: 72.47 CM3
DOP CALCLVOT PEAK VEL VTI: 26.12 CM
E WAVE DECELERATION TIME: 249.45 MSEC
E/A RATIO: 0.71
E/E' RATIO: 16.4 M/S
ECHO LV POSTERIOR WALL: 1.19 CM (ref 0.6–1.1)
EOSINOPHIL # BLD AUTO: 0.1 K/UL (ref 0–0.5)
EOSINOPHIL NFR BLD: 1.2 % (ref 0–8)
ERYTHROCYTE [DISTWIDTH] IN BLOOD BY AUTOMATED COUNT: 14 % (ref 11.5–14.5)
EST. GFR  (AFRICAN AMERICAN): 34 ML/MIN/1.73 M^2
EST. GFR  (NON AFRICAN AMERICAN): 29 ML/MIN/1.73 M^2
ESTIMATED AVG GLUCOSE: 128 MG/DL (ref 68–131)
FOLATE SERPL-MCNC: 18.5 NG/ML (ref 4–24)
FRACTIONAL SHORTENING: 33 % (ref 28–44)
GLUCOSE SERPL-MCNC: 104 MG/DL (ref 70–110)
HBA1C MFR BLD: 6.1 % (ref 4–5.6)
HCT VFR BLD AUTO: 32.9 % (ref 37–48.5)
HGB BLD-MCNC: 10.5 G/DL (ref 12–16)
IMM GRANULOCYTES # BLD AUTO: 0.05 K/UL (ref 0–0.04)
IMM GRANULOCYTES NFR BLD AUTO: 0.7 % (ref 0–0.5)
INTERVENTRICULAR SEPTUM: 1.44 CM (ref 0.6–1.1)
LA MAJOR: 5.06 CM
LA MINOR: 4.92 CM
LA WIDTH: 4.31 CM
LACTATE SERPL-SCNC: 3.9 MMOL/L (ref 0.5–2.2)
LEFT ATRIUM SIZE: 4.02 CM
LEFT ATRIUM VOLUME INDEX: 46.2 ML/M2
LEFT ATRIUM VOLUME: 73.47 CM3
LEFT INTERNAL DIMENSION IN SYSTOLE: 2.08 CM (ref 2.1–4)
LEFT VENTRICLE DIASTOLIC VOLUME INDEX: 24.3 ML/M2
LEFT VENTRICLE DIASTOLIC VOLUME: 38.63 ML
LEFT VENTRICLE MASS INDEX: 84 G/M2
LEFT VENTRICLE SYSTOLIC VOLUME INDEX: 8.8 ML/M2
LEFT VENTRICLE SYSTOLIC VOLUME: 13.99 ML
LEFT VENTRICULAR INTERNAL DIMENSION IN DIASTOLE: 3.12 CM (ref 3.5–6)
LEFT VENTRICULAR MASS: 133.5 G
LV LATERAL E/E' RATIO: 13.67 M/S
LV SEPTAL E/E' RATIO: 20.5 M/S
LYMPHOCYTES # BLD AUTO: 1.8 K/UL (ref 1–4.8)
LYMPHOCYTES NFR BLD: 23.3 % (ref 18–48)
MAGNESIUM SERPL-MCNC: 1.2 MG/DL (ref 1.6–2.6)
MAGNESIUM SERPL-MCNC: 2.3 MG/DL (ref 1.6–2.6)
MCH RBC QN AUTO: 32.6 PG (ref 27–31)
MCHC RBC AUTO-ENTMCNC: 31.9 G/DL (ref 32–36)
MCV RBC AUTO: 102 FL (ref 82–98)
MONOCYTES # BLD AUTO: 0.8 K/UL (ref 0.3–1)
MONOCYTES NFR BLD: 10.8 % (ref 4–15)
MV PEAK A VEL: 1.15 M/S
MV PEAK E VEL: 0.82 M/S
MV STENOSIS PRESSURE HALF TIME: 72.34 MS
MV VALVE AREA P 1/2 METHOD: 3.04 CM2
NEUTROPHILS # BLD AUTO: 4.8 K/UL (ref 1.8–7.7)
NEUTROPHILS NFR BLD: 63.5 % (ref 38–73)
NRBC BLD-RTO: 0 /100 WBC
PHOSPHATE SERPL-MCNC: 2.9 MG/DL (ref 2.7–4.5)
PHOSPHATE SERPL-MCNC: 3 MG/DL (ref 2.7–4.5)
PISA TR MAX VEL: 2.61 M/S
PLATELET # BLD AUTO: 224 K/UL (ref 150–350)
PMV BLD AUTO: 9 FL (ref 9.2–12.9)
POCT GLUCOSE: 122 MG/DL (ref 70–110)
POCT GLUCOSE: 139 MG/DL (ref 70–110)
POCT GLUCOSE: 228 MG/DL (ref 70–110)
POTASSIUM SERPL-SCNC: 3.7 MMOL/L (ref 3.5–5.1)
PROCALCITONIN SERPL IA-MCNC: 0.2 NG/ML
PROCALCITONIN SERPL IA-MCNC: 0.21 NG/ML
PROT SERPL-MCNC: 5.4 G/DL (ref 6–8.4)
PTH-INTACT SERPL-MCNC: 81.7 PG/ML (ref 9–77)
PV PEAK VELOCITY: 0.79 CM/S
RA MAJOR: 4.55 CM
RA PRESSURE: 3 MMHG
RA WIDTH: 3.04 CM
RBC # BLD AUTO: 3.22 M/UL (ref 4–5.4)
RIGHT VENTRICULAR END-DIASTOLIC DIMENSION: 2.77 CM
RV TISSUE DOPPLER FREE WALL SYSTOLIC VELOCITY 1 (APICAL 4 CHAMBER VIEW): 11.46 CM/S
SINUS: 2.72 CM
SODIUM SERPL-SCNC: 139 MMOL/L (ref 136–145)
STJ: 2.01 CM
TDI LATERAL: 0.06 M/S
TDI SEPTAL: 0.04 M/S
TDI: 0.05 M/S
TR MAX PG: 27 MMHG
TRICUSPID ANNULAR PLANE SYSTOLIC EXCURSION: 2.28 CM
TROPONIN I SERPL DL<=0.01 NG/ML-MCNC: 0.04 NG/ML (ref 0–0.03)
TROPONIN I SERPL DL<=0.01 NG/ML-MCNC: 0.04 NG/ML (ref 0–0.03)
TROPONIN I SERPL DL<=0.01 NG/ML-MCNC: 0.05 NG/ML (ref 0–0.03)
TV REST PULMONARY ARTERY PRESSURE: 30 MMHG
VIT B12 SERPL-MCNC: 198 PG/ML (ref 210–950)
WBC # BLD AUTO: 7.59 K/UL (ref 3.9–12.7)

## 2021-02-24 PROCEDURE — 25000003 PHARM REV CODE 250: Performed by: INTERNAL MEDICINE

## 2021-02-24 PROCEDURE — 36415 COLL VENOUS BLD VENIPUNCTURE: CPT

## 2021-02-24 PROCEDURE — 82746 ASSAY OF FOLIC ACID SERUM: CPT

## 2021-02-24 PROCEDURE — 84145 PROCALCITONIN (PCT): CPT

## 2021-02-24 PROCEDURE — 84100 ASSAY OF PHOSPHORUS: CPT

## 2021-02-24 PROCEDURE — 83735 ASSAY OF MAGNESIUM: CPT

## 2021-02-24 PROCEDURE — 82652 VIT D 1 25-DIHYDROXY: CPT

## 2021-02-24 PROCEDURE — 84484 ASSAY OF TROPONIN QUANT: CPT | Mod: 91

## 2021-02-24 PROCEDURE — 11000001 HC ACUTE MED/SURG PRIVATE ROOM

## 2021-02-24 PROCEDURE — 82550 ASSAY OF CK (CPK): CPT

## 2021-02-24 PROCEDURE — 63600175 PHARM REV CODE 636 W HCPCS: Performed by: INTERNAL MEDICINE

## 2021-02-24 PROCEDURE — 83970 ASSAY OF PARATHORMONE: CPT

## 2021-02-24 PROCEDURE — 85025 COMPLETE CBC W/AUTO DIFF WBC: CPT

## 2021-02-24 PROCEDURE — 83605 ASSAY OF LACTIC ACID: CPT

## 2021-02-24 PROCEDURE — 93010 EKG 12-LEAD: ICD-10-PCS | Mod: ,,, | Performed by: INTERNAL MEDICINE

## 2021-02-24 PROCEDURE — 84484 ASSAY OF TROPONIN QUANT: CPT

## 2021-02-24 PROCEDURE — 82607 VITAMIN B-12: CPT

## 2021-02-24 PROCEDURE — 93005 ELECTROCARDIOGRAM TRACING: CPT

## 2021-02-24 PROCEDURE — 80053 COMPREHEN METABOLIC PANEL: CPT

## 2021-02-24 PROCEDURE — 93010 ELECTROCARDIOGRAM REPORT: CPT | Mod: ,,, | Performed by: INTERNAL MEDICINE

## 2021-02-24 PROCEDURE — 82306 VITAMIN D 25 HYDROXY: CPT

## 2021-02-24 PROCEDURE — 83036 HEMOGLOBIN GLYCOSYLATED A1C: CPT

## 2021-02-24 RX ORDER — ACETAMINOPHEN 500 MG
500 TABLET ORAL EVERY 6 HOURS PRN
Status: DISCONTINUED | OUTPATIENT
Start: 2021-02-24 | End: 2021-02-25 | Stop reason: HOSPADM

## 2021-02-24 RX ORDER — IPRATROPIUM BROMIDE AND ALBUTEROL SULFATE 2.5; .5 MG/3ML; MG/3ML
3 SOLUTION RESPIRATORY (INHALATION) EVERY 4 HOURS PRN
Status: DISCONTINUED | OUTPATIENT
Start: 2021-02-24 | End: 2021-02-25 | Stop reason: HOSPADM

## 2021-02-24 RX ORDER — HYDROCODONE BITARTRATE AND ACETAMINOPHEN 5; 325 MG/1; MG/1
1 TABLET ORAL EVERY 6 HOURS PRN
Status: DISCONTINUED | OUTPATIENT
Start: 2021-02-24 | End: 2021-02-25 | Stop reason: HOSPADM

## 2021-02-24 RX ORDER — TALC
6 POWDER (GRAM) TOPICAL NIGHTLY PRN
Status: DISCONTINUED | OUTPATIENT
Start: 2021-02-24 | End: 2021-02-25 | Stop reason: HOSPADM

## 2021-02-24 RX ORDER — INSULIN ASPART 100 [IU]/ML
0-5 INJECTION, SOLUTION INTRAVENOUS; SUBCUTANEOUS EVERY 6 HOURS PRN
Status: DISCONTINUED | OUTPATIENT
Start: 2021-02-24 | End: 2021-02-25 | Stop reason: HOSPADM

## 2021-02-24 RX ORDER — GLUCAGON 1 MG
1 KIT INJECTION
Status: DISCONTINUED | OUTPATIENT
Start: 2021-02-24 | End: 2021-02-25 | Stop reason: HOSPADM

## 2021-02-24 RX ORDER — SODIUM CHLORIDE 9 MG/ML
INJECTION, SOLUTION INTRAVENOUS CONTINUOUS
Status: DISCONTINUED | OUTPATIENT
Start: 2021-02-24 | End: 2021-02-25

## 2021-02-24 RX ORDER — SODIUM CHLORIDE 0.9 % (FLUSH) 0.9 %
10 SYRINGE (ML) INJECTION
Status: DISCONTINUED | OUTPATIENT
Start: 2021-02-24 | End: 2021-02-25 | Stop reason: HOSPADM

## 2021-02-24 RX ORDER — MAGNESIUM SULFATE HEPTAHYDRATE 40 MG/ML
2 INJECTION, SOLUTION INTRAVENOUS ONCE
Status: COMPLETED | OUTPATIENT
Start: 2021-02-24 | End: 2021-02-24

## 2021-02-24 RX ORDER — ASPIRIN 325 MG
325 TABLET ORAL ONCE
Status: COMPLETED | OUTPATIENT
Start: 2021-02-24 | End: 2021-02-24

## 2021-02-24 RX ORDER — NAPROXEN SODIUM 220 MG/1
81 TABLET, FILM COATED ORAL NIGHTLY
Status: DISCONTINUED | OUTPATIENT
Start: 2021-02-25 | End: 2021-02-25 | Stop reason: HOSPADM

## 2021-02-24 RX ORDER — AMOXICILLIN 250 MG
1 CAPSULE ORAL 2 TIMES DAILY PRN
Status: DISCONTINUED | OUTPATIENT
Start: 2021-02-24 | End: 2021-02-25 | Stop reason: HOSPADM

## 2021-02-24 RX ORDER — ONDANSETRON 2 MG/ML
4 INJECTION INTRAMUSCULAR; INTRAVENOUS EVERY 8 HOURS PRN
Status: DISCONTINUED | OUTPATIENT
Start: 2021-02-24 | End: 2021-02-25

## 2021-02-24 RX ORDER — HEPARIN SODIUM 5000 [USP'U]/ML
5000 INJECTION, SOLUTION INTRAVENOUS; SUBCUTANEOUS EVERY 8 HOURS
Status: DISCONTINUED | OUTPATIENT
Start: 2021-02-24 | End: 2021-02-25 | Stop reason: HOSPADM

## 2021-02-24 RX ORDER — ONDANSETRON 2 MG/ML
4 INJECTION INTRAMUSCULAR; INTRAVENOUS
Status: DISCONTINUED | OUTPATIENT
Start: 2021-02-24 | End: 2021-07-08

## 2021-02-24 RX ADMIN — ONDANSETRON 4 MG: 2 INJECTION INTRAMUSCULAR; INTRAVENOUS at 06:02

## 2021-02-24 RX ADMIN — HEPARIN SODIUM 5000 UNITS: 5000 INJECTION INTRAVENOUS; SUBCUTANEOUS at 09:02

## 2021-02-24 RX ADMIN — POTASSIUM CHLORIDE 20 MEQ: 1500 TABLET, EXTENDED RELEASE ORAL at 12:02

## 2021-02-24 RX ADMIN — FOLIC ACID 1000 MCG: 1 TABLET ORAL at 09:02

## 2021-02-24 RX ADMIN — SODIUM CHLORIDE: 0.9 INJECTION, SOLUTION INTRAVENOUS at 11:02

## 2021-02-24 RX ADMIN — SODIUM CHLORIDE 500 ML: 0.9 INJECTION, SOLUTION INTRAVENOUS at 03:02

## 2021-02-24 RX ADMIN — GABAPENTIN 100 MG: 100 CAPSULE ORAL at 09:02

## 2021-02-24 RX ADMIN — CALCIUM 500 MG: 500 TABLET ORAL at 09:02

## 2021-02-24 RX ADMIN — ATORVASTATIN CALCIUM 20 MG: 10 TABLET, FILM COATED ORAL at 09:02

## 2021-02-24 RX ADMIN — MAGNESIUM SULFATE IN WATER 2 G: 40 INJECTION, SOLUTION INTRAVENOUS at 03:02

## 2021-02-24 RX ADMIN — PREDNISONE 5 MG: 5 TABLET ORAL at 09:02

## 2021-02-24 RX ADMIN — HYDROCODONE BITARTRATE AND ACETAMINOPHEN 1 TABLET: 5; 325 TABLET ORAL at 07:02

## 2021-02-24 RX ADMIN — CARVEDILOL 25 MG: 12.5 TABLET, FILM COATED ORAL at 09:02

## 2021-02-24 RX ADMIN — GABAPENTIN 100 MG: 100 CAPSULE ORAL at 03:02

## 2021-02-24 RX ADMIN — PROMETHAZINE HYDROCHLORIDE 6.25 MG: 25 INJECTION INTRAMUSCULAR; INTRAVENOUS at 07:02

## 2021-02-24 RX ADMIN — NIFEDIPINE 30 MG: 30 TABLET, FILM COATED, EXTENDED RELEASE ORAL at 09:02

## 2021-02-24 RX ADMIN — SODIUM CHLORIDE AND POTASSIUM CHLORIDE: 9; 1.49 INJECTION, SOLUTION INTRAVENOUS at 01:02

## 2021-02-24 RX ADMIN — ASPIRIN 325 MG ORAL TABLET 325 MG: 325 PILL ORAL at 12:02

## 2021-02-24 RX ADMIN — LEVOTHYROXINE SODIUM 50 MCG: 50 TABLET ORAL at 06:02

## 2021-02-24 RX ADMIN — CALCIUM 500 MG: 500 TABLET ORAL at 12:02

## 2021-02-24 RX ADMIN — SODIUM CHLORIDE: 0.9 INJECTION, SOLUTION INTRAVENOUS at 06:02

## 2021-02-24 RX ADMIN — CALCIUM GLUCONATE 1000 MG: 98 INJECTION, SOLUTION INTRAVENOUS at 12:02

## 2021-02-24 RX ADMIN — HEPARIN SODIUM 5000 UNITS: 5000 INJECTION INTRAVENOUS; SUBCUTANEOUS at 06:02

## 2021-02-24 RX ADMIN — HEPARIN SODIUM 5000 UNITS: 5000 INJECTION INTRAVENOUS; SUBCUTANEOUS at 03:02

## 2021-02-24 RX ADMIN — INSULIN ASPART 1 UNITS: 100 INJECTION, SOLUTION INTRAVENOUS; SUBCUTANEOUS at 09:02

## 2021-02-25 VITALS
RESPIRATION RATE: 19 BRPM | OXYGEN SATURATION: 96 % | HEIGHT: 62 IN | WEIGHT: 130 LBS | DIASTOLIC BLOOD PRESSURE: 78 MMHG | BODY MASS INDEX: 23.92 KG/M2 | SYSTOLIC BLOOD PRESSURE: 156 MMHG | HEART RATE: 88 BPM | TEMPERATURE: 97 F

## 2021-02-25 LAB
1,25(OH)2D3 SERPL-MCNC: 38 PG/ML (ref 20–79)
ALBUMIN SERPL BCP-MCNC: 3.6 G/DL (ref 3.5–5.2)
ALP SERPL-CCNC: 63 U/L (ref 55–135)
ALT SERPL W/O P-5'-P-CCNC: 14 U/L (ref 10–44)
ANION GAP SERPL CALC-SCNC: 10 MMOL/L (ref 8–16)
AST SERPL-CCNC: 17 U/L (ref 10–40)
BASOPHILS # BLD AUTO: 0.04 K/UL (ref 0–0.2)
BASOPHILS NFR BLD: 0.8 % (ref 0–1.9)
BILIRUB SERPL-MCNC: 0.5 MG/DL (ref 0.1–1)
BUN SERPL-MCNC: 7 MG/DL (ref 8–23)
CALCIUM SERPL-MCNC: 8.1 MG/DL (ref 8.7–10.5)
CHLORIDE SERPL-SCNC: 102 MMOL/L (ref 95–110)
CO2 SERPL-SCNC: 26 MMOL/L (ref 23–29)
CREAT SERPL-MCNC: 1 MG/DL (ref 0.5–1.4)
DIFFERENTIAL METHOD: ABNORMAL
EOSINOPHIL # BLD AUTO: 0 K/UL (ref 0–0.5)
EOSINOPHIL NFR BLD: 0.8 % (ref 0–8)
ERYTHROCYTE [DISTWIDTH] IN BLOOD BY AUTOMATED COUNT: 13.3 % (ref 11.5–14.5)
EST. GFR  (AFRICAN AMERICAN): >60 ML/MIN/1.73 M^2
EST. GFR  (NON AFRICAN AMERICAN): 55 ML/MIN/1.73 M^2
GLUCOSE SERPL-MCNC: 118 MG/DL (ref 70–110)
HCT VFR BLD AUTO: 36.6 % (ref 37–48.5)
HGB BLD-MCNC: 12 G/DL (ref 12–16)
IMM GRANULOCYTES # BLD AUTO: 0.01 K/UL (ref 0–0.04)
IMM GRANULOCYTES NFR BLD AUTO: 0.2 % (ref 0–0.5)
LACTATE SERPL-SCNC: 1.1 MMOL/L (ref 0.5–2.2)
LACTATE SERPL-SCNC: 1.5 MMOL/L (ref 0.5–2.2)
LYMPHOCYTES # BLD AUTO: 1.7 K/UL (ref 1–4.8)
LYMPHOCYTES NFR BLD: 32.8 % (ref 18–48)
MAGNESIUM SERPL-MCNC: 1.2 MG/DL (ref 1.6–2.6)
MCH RBC QN AUTO: 32.7 PG (ref 27–31)
MCHC RBC AUTO-ENTMCNC: 32.8 G/DL (ref 32–36)
MCV RBC AUTO: 100 FL (ref 82–98)
MONOCYTES # BLD AUTO: 0.5 K/UL (ref 0.3–1)
MONOCYTES NFR BLD: 9.9 % (ref 4–15)
NEUTROPHILS # BLD AUTO: 2.8 K/UL (ref 1.8–7.7)
NEUTROPHILS NFR BLD: 55.5 % (ref 38–73)
NRBC BLD-RTO: 0 /100 WBC
PHOSPHATE SERPL-MCNC: 2.4 MG/DL (ref 2.7–4.5)
PLATELET # BLD AUTO: 254 K/UL (ref 150–350)
PMV BLD AUTO: 9.3 FL (ref 9.2–12.9)
POCT GLUCOSE: 169 MG/DL (ref 70–110)
POCT GLUCOSE: 180 MG/DL (ref 70–110)
POTASSIUM SERPL-SCNC: 3 MMOL/L (ref 3.5–5.1)
PROT SERPL-MCNC: 6.8 G/DL (ref 6–8.4)
RBC # BLD AUTO: 3.67 M/UL (ref 4–5.4)
SODIUM SERPL-SCNC: 138 MMOL/L (ref 136–145)
WBC # BLD AUTO: 5.03 K/UL (ref 3.9–12.7)

## 2021-02-25 PROCEDURE — 94761 N-INVAS EAR/PLS OXIMETRY MLT: CPT

## 2021-02-25 PROCEDURE — 99223 PR INITIAL HOSPITAL CARE,LEVL III: ICD-10-PCS | Mod: GC,,, | Performed by: STUDENT IN AN ORGANIZED HEALTH CARE EDUCATION/TRAINING PROGRAM

## 2021-02-25 PROCEDURE — 25000003 PHARM REV CODE 250: Performed by: INTERNAL MEDICINE

## 2021-02-25 PROCEDURE — 63600175 PHARM REV CODE 636 W HCPCS: Performed by: INTERNAL MEDICINE

## 2021-02-25 PROCEDURE — 84100 ASSAY OF PHOSPHORUS: CPT

## 2021-02-25 PROCEDURE — 80053 COMPREHEN METABOLIC PANEL: CPT

## 2021-02-25 PROCEDURE — 36415 COLL VENOUS BLD VENIPUNCTURE: CPT

## 2021-02-25 PROCEDURE — 83605 ASSAY OF LACTIC ACID: CPT

## 2021-02-25 PROCEDURE — 83735 ASSAY OF MAGNESIUM: CPT

## 2021-02-25 PROCEDURE — 85025 COMPLETE CBC W/AUTO DIFF WBC: CPT

## 2021-02-25 PROCEDURE — 83605 ASSAY OF LACTIC ACID: CPT | Mod: 91

## 2021-02-25 PROCEDURE — 99223 1ST HOSP IP/OBS HIGH 75: CPT | Mod: GC,,, | Performed by: STUDENT IN AN ORGANIZED HEALTH CARE EDUCATION/TRAINING PROGRAM

## 2021-02-25 RX ORDER — HYDROMORPHONE HYDROCHLORIDE 2 MG/ML
0.25 INJECTION, SOLUTION INTRAMUSCULAR; INTRAVENOUS; SUBCUTANEOUS ONCE
Status: COMPLETED | OUTPATIENT
Start: 2021-02-25 | End: 2021-02-25

## 2021-02-25 RX ORDER — ONDANSETRON 2 MG/ML
4 INJECTION INTRAMUSCULAR; INTRAVENOUS EVERY 6 HOURS PRN
Status: DISCONTINUED | OUTPATIENT
Start: 2021-02-25 | End: 2021-02-25 | Stop reason: HOSPADM

## 2021-02-25 RX ORDER — POTASSIUM CHLORIDE 20 MEQ/1
60 TABLET, EXTENDED RELEASE ORAL ONCE
Status: COMPLETED | OUTPATIENT
Start: 2021-02-25 | End: 2021-02-25

## 2021-02-25 RX ORDER — HYDRALAZINE HYDROCHLORIDE 25 MG/1
25 TABLET, FILM COATED ORAL EVERY 8 HOURS
Qty: 90 TABLET | Refills: 5 | Status: ON HOLD | OUTPATIENT
Start: 2021-02-25 | End: 2021-07-10 | Stop reason: SDUPTHER

## 2021-02-25 RX ORDER — HYDRALAZINE HYDROCHLORIDE 25 MG/1
25 TABLET, FILM COATED ORAL EVERY 8 HOURS
Status: DISCONTINUED | OUTPATIENT
Start: 2021-02-25 | End: 2021-02-25 | Stop reason: HOSPADM

## 2021-02-25 RX ORDER — MAGNESIUM SULFATE HEPTAHYDRATE 40 MG/ML
2 INJECTION, SOLUTION INTRAVENOUS ONCE
Status: COMPLETED | OUTPATIENT
Start: 2021-02-25 | End: 2021-02-25

## 2021-02-25 RX ADMIN — LEVOTHYROXINE SODIUM 50 MCG: 50 TABLET ORAL at 05:02

## 2021-02-25 RX ADMIN — CARVEDILOL 25 MG: 12.5 TABLET, FILM COATED ORAL at 08:02

## 2021-02-25 RX ADMIN — HYDROMORPHONE HYDROCHLORIDE 0.25 MG: 2 INJECTION INTRAMUSCULAR; INTRAVENOUS; SUBCUTANEOUS at 12:02

## 2021-02-25 RX ADMIN — ACETAMINOPHEN 500 MG: 500 TABLET ORAL at 08:02

## 2021-02-25 RX ADMIN — HYDROCODONE BITARTRATE AND ACETAMINOPHEN 1 TABLET: 5; 325 TABLET ORAL at 08:02

## 2021-02-25 RX ADMIN — POTASSIUM CHLORIDE 60 MEQ: 1500 TABLET, EXTENDED RELEASE ORAL at 10:02

## 2021-02-25 RX ADMIN — HEPARIN SODIUM 5000 UNITS: 5000 INJECTION INTRAVENOUS; SUBCUTANEOUS at 05:02

## 2021-02-25 RX ADMIN — ONDANSETRON 4 MG: 2 INJECTION INTRAMUSCULAR; INTRAVENOUS at 07:02

## 2021-02-25 RX ADMIN — SODIUM CHLORIDE: 0.9 INJECTION, SOLUTION INTRAVENOUS at 12:02

## 2021-02-25 RX ADMIN — HYDRALAZINE HYDROCHLORIDE 25 MG: 25 TABLET, FILM COATED ORAL at 10:02

## 2021-02-25 RX ADMIN — MAGNESIUM SULFATE IN WATER 2 G: 40 INJECTION, SOLUTION INTRAVENOUS at 10:02

## 2021-02-25 RX ADMIN — NIFEDIPINE 30 MG: 30 TABLET, FILM COATED, EXTENDED RELEASE ORAL at 08:02

## 2021-02-25 RX ADMIN — ONDANSETRON 4 MG: 2 INJECTION INTRAMUSCULAR; INTRAVENOUS at 12:02

## 2021-02-25 RX ADMIN — HYDRALAZINE HYDROCHLORIDE 25 MG: 25 TABLET, FILM COATED ORAL at 12:02

## 2021-02-25 RX ADMIN — SODIUM CHLORIDE: 0.9 INJECTION, SOLUTION INTRAVENOUS at 05:02

## 2021-02-28 LAB
BACTERIA BLD CULT: NORMAL
BACTERIA BLD CULT: NORMAL

## 2021-03-01 ENCOUNTER — INFUSION (OUTPATIENT)
Dept: INFUSION THERAPY | Facility: HOSPITAL | Age: 76
End: 2021-03-01
Attending: INTERNAL MEDICINE
Payer: MEDICARE

## 2021-03-01 DIAGNOSIS — D50.9 IRON DEFICIENCY ANEMIA, UNSPECIFIED IRON DEFICIENCY ANEMIA TYPE: ICD-10-CM

## 2021-03-01 DIAGNOSIS — D63.1 ANEMIA IN CHRONIC KIDNEY DISEASE, UNSPECIFIED CKD STAGE: Primary | ICD-10-CM

## 2021-03-01 DIAGNOSIS — Z53.1 REFUSAL OF BLOOD TRANSFUSIONS AS PATIENT IS JEHOVAH'S WITNESS: ICD-10-CM

## 2021-03-01 DIAGNOSIS — N18.9 ANEMIA IN CHRONIC KIDNEY DISEASE, UNSPECIFIED CKD STAGE: Primary | ICD-10-CM

## 2021-03-01 PROCEDURE — 63600175 PHARM REV CODE 636 W HCPCS: Performed by: INTERNAL MEDICINE

## 2021-03-01 PROCEDURE — 96372 THER/PROPH/DIAG INJ SC/IM: CPT

## 2021-03-01 RX ADMIN — EPOETIN ALFA-EPBX 20000 UNITS: 20000 INJECTION, SOLUTION INTRAVENOUS; SUBCUTANEOUS at 12:03

## 2021-03-02 ENCOUNTER — OFFICE VISIT (OUTPATIENT)
Dept: HEMATOLOGY/ONCOLOGY | Facility: CLINIC | Age: 76
End: 2021-03-02
Payer: MEDICARE

## 2021-03-02 VITALS
HEART RATE: 81 BPM | TEMPERATURE: 98 F | DIASTOLIC BLOOD PRESSURE: 95 MMHG | HEIGHT: 62 IN | SYSTOLIC BLOOD PRESSURE: 190 MMHG | OXYGEN SATURATION: 99 % | WEIGHT: 120.13 LBS | BODY MASS INDEX: 22.11 KG/M2

## 2021-03-02 DIAGNOSIS — D63.1 ANEMIA IN CHRONIC KIDNEY DISEASE, UNSPECIFIED CKD STAGE: Primary | ICD-10-CM

## 2021-03-02 DIAGNOSIS — G89.4 CHRONIC PAIN SYNDROME: ICD-10-CM

## 2021-03-02 DIAGNOSIS — N18.9 ANEMIA IN CHRONIC KIDNEY DISEASE, UNSPECIFIED CKD STAGE: Primary | ICD-10-CM

## 2021-03-02 DIAGNOSIS — N18.9 CHRONIC KIDNEY DISEASE, UNSPECIFIED CKD STAGE: ICD-10-CM

## 2021-03-02 PROCEDURE — 1159F PR MEDICATION LIST DOCUMENTED IN MEDICAL RECORD: ICD-10-PCS | Mod: S$GLB,,, | Performed by: INTERNAL MEDICINE

## 2021-03-02 PROCEDURE — 99214 PR OFFICE/OUTPT VISIT, EST, LEVL IV, 30-39 MIN: ICD-10-PCS | Mod: S$GLB,,, | Performed by: INTERNAL MEDICINE

## 2021-03-02 PROCEDURE — 1159F MED LIST DOCD IN RCRD: CPT | Mod: S$GLB,,, | Performed by: INTERNAL MEDICINE

## 2021-03-02 PROCEDURE — 99999 PR PBB SHADOW E&M-EST. PATIENT-LVL V: CPT | Mod: PBBFAC,,, | Performed by: INTERNAL MEDICINE

## 2021-03-02 PROCEDURE — 99214 OFFICE O/P EST MOD 30 MIN: CPT | Mod: S$GLB,,, | Performed by: INTERNAL MEDICINE

## 2021-03-02 PROCEDURE — 1157F ADVNC CARE PLAN IN RCRD: CPT | Mod: S$GLB,,, | Performed by: INTERNAL MEDICINE

## 2021-03-02 PROCEDURE — 1157F PR ADVANCE CARE PLAN OR EQUIV PRESENT IN MEDICAL RECORD: ICD-10-PCS | Mod: S$GLB,,, | Performed by: INTERNAL MEDICINE

## 2021-03-02 PROCEDURE — 3080F DIAST BP >= 90 MM HG: CPT | Mod: CPTII,S$GLB,, | Performed by: INTERNAL MEDICINE

## 2021-03-02 PROCEDURE — 3072F LOW RISK FOR RETINOPATHY: CPT | Mod: S$GLB,,, | Performed by: INTERNAL MEDICINE

## 2021-03-02 PROCEDURE — 3080F PR MOST RECENT DIASTOLIC BLOOD PRESSURE >= 90 MM HG: ICD-10-PCS | Mod: CPTII,S$GLB,, | Performed by: INTERNAL MEDICINE

## 2021-03-02 PROCEDURE — 3072F PR LOW RISK FOR RETINOPATHY: ICD-10-PCS | Mod: S$GLB,,, | Performed by: INTERNAL MEDICINE

## 2021-03-02 PROCEDURE — 3288F FALL RISK ASSESSMENT DOCD: CPT | Mod: CPTII,S$GLB,, | Performed by: INTERNAL MEDICINE

## 2021-03-02 PROCEDURE — 3077F SYST BP >= 140 MM HG: CPT | Mod: CPTII,S$GLB,, | Performed by: INTERNAL MEDICINE

## 2021-03-02 PROCEDURE — 3077F PR MOST RECENT SYSTOLIC BLOOD PRESSURE >= 140 MM HG: ICD-10-PCS | Mod: CPTII,S$GLB,, | Performed by: INTERNAL MEDICINE

## 2021-03-02 PROCEDURE — 1126F AMNT PAIN NOTED NONE PRSNT: CPT | Mod: S$GLB,,, | Performed by: INTERNAL MEDICINE

## 2021-03-02 PROCEDURE — 3288F PR FALLS RISK ASSESSMENT DOCUMENTED: ICD-10-PCS | Mod: CPTII,S$GLB,, | Performed by: INTERNAL MEDICINE

## 2021-03-02 PROCEDURE — 1101F PT FALLS ASSESS-DOCD LE1/YR: CPT | Mod: CPTII,S$GLB,, | Performed by: INTERNAL MEDICINE

## 2021-03-02 PROCEDURE — 99999 PR PBB SHADOW E&M-EST. PATIENT-LVL V: ICD-10-PCS | Mod: PBBFAC,,, | Performed by: INTERNAL MEDICINE

## 2021-03-02 PROCEDURE — 1101F PR PT FALLS ASSESS DOC 0-1 FALLS W/OUT INJ PAST YR: ICD-10-PCS | Mod: CPTII,S$GLB,, | Performed by: INTERNAL MEDICINE

## 2021-03-02 PROCEDURE — 1126F PR PAIN SEVERITY QUANTIFIED, NO PAIN PRESENT: ICD-10-PCS | Mod: S$GLB,,, | Performed by: INTERNAL MEDICINE

## 2021-03-03 DIAGNOSIS — M79.10 MYALGIA: ICD-10-CM

## 2021-03-04 ENCOUNTER — PATIENT OUTREACH (OUTPATIENT)
Dept: ADMINISTRATIVE | Facility: HOSPITAL | Age: 76
End: 2021-03-04

## 2021-03-04 RX ORDER — TIZANIDINE 2 MG/1
4 TABLET ORAL EVERY 8 HOURS PRN
Qty: 180 TABLET | Refills: 0 | Status: SHIPPED | OUTPATIENT
Start: 2021-03-04 | End: 2021-04-05 | Stop reason: SDUPTHER

## 2021-03-05 ENCOUNTER — TELEPHONE (OUTPATIENT)
Dept: OPHTHALMOLOGY | Facility: CLINIC | Age: 76
End: 2021-03-05

## 2021-03-05 DIAGNOSIS — H34.8120 HEMISPHERIC RETINAL VEIN OCCLUSION WITH MACULAR EDEMA OF LEFT EYE: Primary | ICD-10-CM

## 2021-03-09 ENCOUNTER — PATIENT MESSAGE (OUTPATIENT)
Dept: FAMILY MEDICINE | Facility: CLINIC | Age: 76
End: 2021-03-09

## 2021-03-10 ENCOUNTER — TELEPHONE (OUTPATIENT)
Dept: FAMILY MEDICINE | Facility: CLINIC | Age: 76
End: 2021-03-10

## 2021-03-12 ENCOUNTER — HOSPITAL ENCOUNTER (OUTPATIENT)
Facility: HOSPITAL | Age: 76
Discharge: HOME | End: 2021-03-13
Attending: STUDENT IN AN ORGANIZED HEALTH CARE EDUCATION/TRAINING PROGRAM | Admitting: STUDENT IN AN ORGANIZED HEALTH CARE EDUCATION/TRAINING PROGRAM
Payer: MEDICARE

## 2021-03-12 DIAGNOSIS — G62.9 POLYNEUROPATHY: ICD-10-CM

## 2021-03-12 DIAGNOSIS — I10 ESSENTIAL HYPERTENSION: ICD-10-CM

## 2021-03-12 DIAGNOSIS — R53.83 FATIGUE, UNSPECIFIED TYPE: ICD-10-CM

## 2021-03-12 DIAGNOSIS — S01.01XA LACERATION OF SCALP WITHOUT FOREIGN BODY, INITIAL ENCOUNTER: ICD-10-CM
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
## 2021-03-12 DIAGNOSIS — R55 SYNCOPE: ICD-10-CM

## 2021-03-12 DIAGNOSIS — E11.3292 CONTROLLED TYPE 2 DIABETES MELLITUS WITH LEFT EYE AFFECTED BY MILD NONPROLIFERATIVE RETINOPATHY WITHOUT MACULAR EDEMA, WITHOUT LONG-TERM CURRENT USE OF INSULIN: ICD-10-CM

## 2021-03-12 PROBLEM — G89.29 CHRONIC PAIN: Chronic | Status: ACTIVE | Noted: 2019-09-12

## 2021-03-12 PROBLEM — R11.2 NAUSEA AND VOMITING: Status: RESOLVED | Noted: 2021-02-24 | Resolved: 2021-03-12

## 2021-03-12 PROBLEM — E87.20 LACTIC ACIDOSIS: Status: RESOLVED | Noted: 2021-02-24 | Resolved: 2021-03-12

## 2021-03-12 LAB
ALBUMIN SERPL BCP-MCNC: 4.4 G/DL (ref 3.5–5.2)
ALP SERPL-CCNC: 71 U/L (ref 55–135)
ALT SERPL W/O P-5'-P-CCNC: 10 U/L (ref 10–44)
AMPHET+METHAMPHET UR QL: NEGATIVE
ANION GAP SERPL CALC-SCNC: 11 MMOL/L (ref 8–16)
AORTIC ROOT ANNULUS: 2.77 CM
AORTIC VALVE CUSP SEPERATION: 1.69 CM
AST SERPL-CCNC: 18 U/L (ref 10–40)
AV INDEX (PROSTH): 0.56
AV MEAN GRADIENT: 5 MMHG
AV PEAK GRADIENT: 9 MMHG
AV VALVE AREA: 2.05 CM2
AV VELOCITY RATIO: 0.64
BARBITURATES UR QL SCN>200 NG/ML: NEGATIVE
BASOPHILS # BLD AUTO: 0.03 K/UL (ref 0–0.2)
BASOPHILS NFR BLD: 0.4 % (ref 0–1.9)
BENZODIAZ UR QL SCN>200 NG/ML: NEGATIVE
BILIRUB SERPL-MCNC: 0.6 MG/DL (ref 0.1–1)
BILIRUB UR QL STRIP: NEGATIVE
BSA FOR ECHO PROCEDURE: 1.54 M2
BUN SERPL-MCNC: 12 MG/DL (ref 8–23)
BZE UR QL SCN: NEGATIVE
CALCIUM SERPL-MCNC: 9.5 MG/DL (ref 8.7–10.5)
CANNABINOIDS UR QL SCN: NEGATIVE
CHLORIDE SERPL-SCNC: 102 MMOL/L (ref 95–110)
CLARITY UR: CLEAR
CO2 SERPL-SCNC: 25 MMOL/L (ref 23–29)
COLOR UR: COLORLESS
CREAT SERPL-MCNC: 1 MG/DL (ref 0.5–1.4)
CREAT UR-MCNC: 6.2 MG/DL (ref 15–325)
CTP QC/QA: YES
CV ECHO LV RWT: 0.48 CM
DIFFERENTIAL METHOD: ABNORMAL
DOP CALC AO PEAK VEL: 1.46 M/S
DOP CALC AO VTI: 26.27 CM
DOP CALC LVOT AREA: 3.6 CM2
DOP CALC LVOT DIAMETER: 2.15 CM
DOP CALC LVOT PEAK VEL: 0.94 M/S
DOP CALC LVOT STROKE VOLUME: 53.78 CM3
DOP CALCLVOT PEAK VEL VTI: 14.82 CM
E WAVE DECELERATION TIME: 160.88 MSEC
E/A RATIO: 0.68
E/E' RATIO: 15.4 M/S
ECHO LV POSTERIOR WALL: 0.87 CM (ref 0.6–1.1)
EOSINOPHIL # BLD AUTO: 0.2 K/UL (ref 0–0.5)
EOSINOPHIL NFR BLD: 2 % (ref 0–8)
ERYTHROCYTE [DISTWIDTH] IN BLOOD BY AUTOMATED COUNT: 14 % (ref 11.5–14.5)
EST. GFR  (AFRICAN AMERICAN): >60 ML/MIN/1.73 M^2
EST. GFR  (NON AFRICAN AMERICAN): 55 ML/MIN/1.73 M^2
FRACTIONAL SHORTENING: 35 % (ref 28–44)
GLUCOSE SERPL-MCNC: 112 MG/DL (ref 70–110)
GLUCOSE SERPL-MCNC: 141 MG/DL (ref 70–110)
GLUCOSE UR QL STRIP: NEGATIVE
HCT VFR BLD AUTO: 37.3 % (ref 37–48.5)
HGB BLD-MCNC: 12 G/DL (ref 12–16)
HGB UR QL STRIP: NEGATIVE
IMM GRANULOCYTES # BLD AUTO: 0.06 K/UL (ref 0–0.04)
IMM GRANULOCYTES NFR BLD AUTO: 0.8 % (ref 0–0.5)
INTERVENTRICULAR SEPTUM: 0.75 CM (ref 0.6–1.1)
IVRT: 117.03 MSEC
KETONES UR QL STRIP: NEGATIVE
LA MAJOR: 4.37 CM
LA MINOR: 4.48 CM
LEFT ATRIUM SIZE: 2.09 CM
LEFT INTERNAL DIMENSION IN SYSTOLE: 2.33 CM (ref 2.1–4)
LEFT VENTRICLE DIASTOLIC VOLUME INDEX: 35.6 ML/M2
LEFT VENTRICLE DIASTOLIC VOLUME: 54.47 ML
LEFT VENTRICLE MASS INDEX: 52 G/M2
LEFT VENTRICLE SYSTOLIC VOLUME INDEX: 12.2 ML/M2
LEFT VENTRICLE SYSTOLIC VOLUME: 18.73 ML
LEFT VENTRICULAR INTERNAL DIMENSION IN DIASTOLE: 3.6 CM (ref 3.5–6)
LEFT VENTRICULAR MASS: 80.12 G
LEUKOCYTE ESTERASE UR QL STRIP: NEGATIVE
LV LATERAL E/E' RATIO: 15.4 M/S
LV SEPTAL E/E' RATIO: 15.4 M/S
LYMPHOCYTES # BLD AUTO: 1.4 K/UL (ref 1–4.8)
LYMPHOCYTES NFR BLD: 18.8 % (ref 18–48)
MCH RBC QN AUTO: 32.4 PG (ref 27–31)
MCHC RBC AUTO-ENTMCNC: 32.2 G/DL (ref 32–36)
MCV RBC AUTO: 101 FL (ref 82–98)
METHADONE UR QL SCN>300 NG/ML: NEGATIVE
MONOCYTES # BLD AUTO: 0.6 K/UL (ref 0.3–1)
MONOCYTES NFR BLD: 7.8 % (ref 4–15)
MV PEAK A VEL: 1.14 M/S
MV PEAK E VEL: 0.77 M/S
MV STENOSIS PRESSURE HALF TIME: 49.59 MS
MV VALVE AREA P 1/2 METHOD: 4.44 CM2
NEUTROPHILS # BLD AUTO: 5.3 K/UL (ref 1.8–7.7)
NEUTROPHILS NFR BLD: 70.2 % (ref 38–73)
NITRITE UR QL STRIP: NEGATIVE
NRBC BLD-RTO: 0 /100 WBC
OPIATES UR QL SCN: ABNORMAL
PCP UR QL SCN>25 NG/ML: NEGATIVE
PH UR STRIP: 7 [PH] (ref 5–8)
PISA TR MAX VEL: 2.89 M/S
PLATELET # BLD AUTO: 296 K/UL (ref 150–350)
PMV BLD AUTO: 8.7 FL (ref 9.2–12.9)
POCT GLUCOSE: 162 MG/DL (ref 70–110)
POTASSIUM SERPL-SCNC: 4 MMOL/L (ref 3.5–5.1)
PROT SERPL-MCNC: 8.2 G/DL (ref 6–8.4)
PROT UR QL STRIP: NEGATIVE
PULM VEIN S/D RATIO: 1.17
PV PEAK D VEL: 0.41 M/S
PV PEAK S VEL: 0.48 M/S
PV PEAK VELOCITY: 0.94 CM/S
RA MAJOR: 3.92 CM
RA PRESSURE: 3 MMHG
RA WIDTH: 2.82 CM
RBC # BLD AUTO: 3.7 M/UL (ref 4–5.4)
RIGHT VENTRICULAR END-DIASTOLIC DIMENSION: 2.7 CM
RV TISSUE DOPPLER FREE WALL SYSTOLIC VELOCITY 1 (APICAL 4 CHAMBER VIEW): 33.25 CM/S
SARS-COV-2 RDRP RESP QL NAA+PROBE: NEGATIVE
SINUS: 2.9 CM
SODIUM SERPL-SCNC: 138 MMOL/L (ref 136–145)
SP GR UR STRIP: <1.005 (ref 1–1.03)
STJ: 2.37 CM
TDI LATERAL: 0.05 M/S
TDI SEPTAL: 0.05 M/S
TDI: 0.05 M/S
TOXICOLOGY INFORMATION: ABNORMAL
TR MAX PG: 33 MMHG
TRICUSPID ANNULAR PLANE SYSTOLIC EXCURSION: 1.34 CM
TROPONIN I SERPL DL<=0.01 NG/ML-MCNC: 0.01 NG/ML (ref 0–0.03)
TROPONIN I SERPL DL<=0.01 NG/ML-MCNC: 0.01 NG/ML (ref 0–0.03)
TROPONIN I SERPL DL<=0.01 NG/ML-MCNC: <0.006 NG/ML (ref 0–0.03)
TV REST PULMONARY ARTERY PRESSURE: 36 MMHG
URN SPEC COLLECT METH UR: ABNORMAL
UROBILINOGEN UR STRIP-ACNC: NEGATIVE EU/DL
WBC # BLD AUTO: 7.52 K/UL (ref 3.9–12.7)

## 2021-03-12 PROCEDURE — 36415 COLL VENOUS BLD VENIPUNCTURE: CPT | Performed by: STUDENT IN AN ORGANIZED HEALTH CARE EDUCATION/TRAINING PROGRAM

## 2021-03-12 PROCEDURE — 85025 COMPLETE CBC W/AUTO DIFF WBC: CPT | Performed by: STUDENT IN AN ORGANIZED HEALTH CARE EDUCATION/TRAINING PROGRAM

## 2021-03-12 PROCEDURE — 93005 ELECTROCARDIOGRAM TRACING: CPT

## 2021-03-12 PROCEDURE — U0002 COVID-19 LAB TEST NON-CDC: HCPCS | Performed by: STUDENT IN AN ORGANIZED HEALTH CARE EDUCATION/TRAINING PROGRAM

## 2021-03-12 PROCEDURE — 80171 DRUG SCREEN QUANT GABAPENTIN: CPT | Performed by: STUDENT IN AN ORGANIZED HEALTH CARE EDUCATION/TRAINING PROGRAM

## 2021-03-12 PROCEDURE — 25000003 PHARM REV CODE 250: Performed by: STUDENT IN AN ORGANIZED HEALTH CARE EDUCATION/TRAINING PROGRAM

## 2021-03-12 PROCEDURE — 84484 ASSAY OF TROPONIN QUANT: CPT | Performed by: HOSPITALIST

## 2021-03-12 PROCEDURE — 96372 THER/PROPH/DIAG INJ SC/IM: CPT | Mod: 59

## 2021-03-12 PROCEDURE — 93010 EKG 12-LEAD: ICD-10-PCS | Mod: ,,, | Performed by: INTERNAL MEDICINE

## 2021-03-12 PROCEDURE — 80307 DRUG TEST PRSMV CHEM ANLYZR: CPT | Performed by: HOSPITALIST

## 2021-03-12 PROCEDURE — 25000003 PHARM REV CODE 250: Performed by: HOSPITALIST

## 2021-03-12 PROCEDURE — 81003 URINALYSIS AUTO W/O SCOPE: CPT | Mod: 59 | Performed by: HOSPITALIST

## 2021-03-12 PROCEDURE — 63600175 PHARM REV CODE 636 W HCPCS: Performed by: STUDENT IN AN ORGANIZED HEALTH CARE EDUCATION/TRAINING PROGRAM

## 2021-03-12 PROCEDURE — G0378 HOSPITAL OBSERVATION PER HR: HCPCS

## 2021-03-12 PROCEDURE — 96374 THER/PROPH/DIAG INJ IV PUSH: CPT

## 2021-03-12 PROCEDURE — 84484 ASSAY OF TROPONIN QUANT: CPT | Mod: 91 | Performed by: STUDENT IN AN ORGANIZED HEALTH CARE EDUCATION/TRAINING PROGRAM

## 2021-03-12 PROCEDURE — 93010 ELECTROCARDIOGRAM REPORT: CPT | Mod: ,,, | Performed by: INTERNAL MEDICINE

## 2021-03-12 PROCEDURE — 99285 EMERGENCY DEPT VISIT HI MDM: CPT | Mod: 25

## 2021-03-12 PROCEDURE — 63600175 PHARM REV CODE 636 W HCPCS: Performed by: HOSPITALIST

## 2021-03-12 PROCEDURE — 12001 RPR S/N/AX/GEN/TRNK 2.5CM/<: CPT

## 2021-03-12 PROCEDURE — 80053 COMPREHEN METABOLIC PANEL: CPT | Performed by: STUDENT IN AN ORGANIZED HEALTH CARE EDUCATION/TRAINING PROGRAM

## 2021-03-12 RX ORDER — HYDRALAZINE HYDROCHLORIDE 25 MG/1
25 TABLET, FILM COATED ORAL EVERY 8 HOURS
Status: DISCONTINUED | OUTPATIENT
Start: 2021-03-12 | End: 2021-03-13 | Stop reason: HOSPADM

## 2021-03-12 RX ORDER — CARVEDILOL 12.5 MG/1
25 TABLET ORAL 2 TIMES DAILY
Status: DISCONTINUED | OUTPATIENT
Start: 2021-03-12 | End: 2021-03-13 | Stop reason: HOSPADM

## 2021-03-12 RX ORDER — LEVOTHYROXINE SODIUM 50 UG/1
50 TABLET ORAL
Status: DISCONTINUED | OUTPATIENT
Start: 2021-03-13 | End: 2021-03-13 | Stop reason: HOSPADM

## 2021-03-12 RX ORDER — TIZANIDINE 4 MG/1
4 TABLET ORAL EVERY 8 HOURS PRN
Status: DISCONTINUED | OUTPATIENT
Start: 2021-03-12 | End: 2021-03-13 | Stop reason: HOSPADM

## 2021-03-12 RX ORDER — CLONIDINE HYDROCHLORIDE 0.1 MG/1
0.3 TABLET ORAL 3 TIMES DAILY
Status: DISCONTINUED | OUTPATIENT
Start: 2021-03-12 | End: 2021-03-13 | Stop reason: HOSPADM

## 2021-03-12 RX ORDER — ATORVASTATIN CALCIUM 10 MG/1
20 TABLET, FILM COATED ORAL DAILY
Status: DISCONTINUED | OUTPATIENT
Start: 2021-03-13 | End: 2021-03-13 | Stop reason: HOSPADM

## 2021-03-12 RX ORDER — HYDROCODONE BITARTRATE AND ACETAMINOPHEN 5; 325 MG/1; MG/1
1 TABLET ORAL
Status: COMPLETED | OUTPATIENT
Start: 2021-03-12 | End: 2021-03-12

## 2021-03-12 RX ORDER — GLUCAGON 1 MG
1 KIT INJECTION
Status: DISCONTINUED | OUTPATIENT
Start: 2021-03-12 | End: 2021-03-13 | Stop reason: HOSPADM

## 2021-03-12 RX ORDER — HYDROCODONE BITARTRATE AND ACETAMINOPHEN 5; 325 MG/1; MG/1
1 TABLET ORAL EVERY 6 HOURS PRN
Status: DISCONTINUED | OUTPATIENT
Start: 2021-03-12 | End: 2021-03-13 | Stop reason: HOSPADM

## 2021-03-12 RX ORDER — FOLIC ACID 1 MG/1
1000 TABLET ORAL DAILY
Status: DISCONTINUED | OUTPATIENT
Start: 2021-03-13 | End: 2021-03-13 | Stop reason: HOSPADM

## 2021-03-12 RX ORDER — INSULIN ASPART 100 [IU]/ML
1-10 INJECTION, SOLUTION INTRAVENOUS; SUBCUTANEOUS
Status: DISCONTINUED | OUTPATIENT
Start: 2021-03-12 | End: 2021-03-13 | Stop reason: HOSPADM

## 2021-03-12 RX ORDER — TALC
6 POWDER (GRAM) TOPICAL NIGHTLY PRN
Status: DISCONTINUED | OUTPATIENT
Start: 2021-03-12 | End: 2021-03-13 | Stop reason: HOSPADM

## 2021-03-12 RX ORDER — SODIUM CHLORIDE 0.9 % (FLUSH) 0.9 %
10 SYRINGE (ML) INJECTION
Status: DISCONTINUED | OUTPATIENT
Start: 2021-03-12 | End: 2021-03-13 | Stop reason: HOSPADM

## 2021-03-12 RX ORDER — NIFEDIPINE 30 MG/1
30 TABLET, EXTENDED RELEASE ORAL DAILY
Status: DISCONTINUED | OUTPATIENT
Start: 2021-03-13 | End: 2021-03-13 | Stop reason: HOSPADM

## 2021-03-12 RX ORDER — IBUPROFEN 200 MG
24 TABLET ORAL
Status: DISCONTINUED | OUTPATIENT
Start: 2021-03-12 | End: 2021-03-13 | Stop reason: HOSPADM

## 2021-03-12 RX ORDER — LIDOCAINE HYDROCHLORIDE AND EPINEPHRINE 10; 10 MG/ML; UG/ML
10 INJECTION, SOLUTION INFILTRATION; PERINEURAL
Status: COMPLETED | OUTPATIENT
Start: 2021-03-12 | End: 2021-03-12

## 2021-03-12 RX ORDER — PREDNISONE 5 MG/1
5 TABLET ORAL DAILY
Status: DISCONTINUED | OUTPATIENT
Start: 2021-03-13 | End: 2021-03-13 | Stop reason: HOSPADM

## 2021-03-12 RX ORDER — IBUPROFEN 200 MG
16 TABLET ORAL
Status: DISCONTINUED | OUTPATIENT
Start: 2021-03-12 | End: 2021-03-13 | Stop reason: HOSPADM

## 2021-03-12 RX ORDER — GABAPENTIN 400 MG/1
400 CAPSULE ORAL 3 TIMES DAILY
Status: DISCONTINUED | OUTPATIENT
Start: 2021-03-12 | End: 2021-03-13 | Stop reason: HOSPADM

## 2021-03-12 RX ORDER — ACETAMINOPHEN 325 MG/1
650 TABLET ORAL EVERY 6 HOURS PRN
Status: DISCONTINUED | OUTPATIENT
Start: 2021-03-12 | End: 2021-03-13 | Stop reason: HOSPADM

## 2021-03-12 RX ORDER — MORPHINE SULFATE 4 MG/ML
2 INJECTION, SOLUTION INTRAMUSCULAR; INTRAVENOUS
Status: COMPLETED | OUTPATIENT
Start: 2021-03-12 | End: 2021-03-12

## 2021-03-12 RX ADMIN — BACITRACIN, NEOMYCIN, POLYMYXIN B 1 EACH: 400; 3.5; 5 OINTMENT TOPICAL at 03:03

## 2021-03-12 RX ADMIN — CLONIDINE HYDROCHLORIDE 0.3 MG: 0.1 TABLET ORAL at 10:03

## 2021-03-12 RX ADMIN — MORPHINE SULFATE 2 MG: 4 INJECTION INTRAVENOUS at 01:03

## 2021-03-12 RX ADMIN — LIDOCAINE HYDROCHLORIDE,EPINEPHRINE BITARTRATE 10 ML: 10; .01 INJECTION, SOLUTION INFILTRATION; PERINEURAL at 02:03

## 2021-03-12 RX ADMIN — INSULIN ASPART 1 UNITS: 100 INJECTION, SOLUTION INTRAVENOUS; SUBCUTANEOUS at 10:03

## 2021-03-12 RX ADMIN — HYDROCODONE BITARTRATE AND ACETAMINOPHEN 1 TABLET: 5; 325 TABLET ORAL at 02:03

## 2021-03-12 RX ADMIN — HYDRALAZINE HYDROCHLORIDE 25 MG: 25 TABLET, FILM COATED ORAL at 10:03

## 2021-03-12 RX ADMIN — GABAPENTIN 400 MG: 400 CAPSULE ORAL at 10:03

## 2021-03-12 RX ADMIN — CARVEDILOL 25 MG: 12.5 TABLET, FILM COATED ORAL at 10:03

## 2021-03-12 RX ADMIN — HYDROCODONE BITARTRATE AND ACETAMINOPHEN 1 TABLET: 5; 325 TABLET ORAL at 10:03

## 2021-03-13 VITALS
WEIGHT: 115.5 LBS | HEIGHT: 62 IN | RESPIRATION RATE: 18 BRPM | SYSTOLIC BLOOD PRESSURE: 106 MMHG | DIASTOLIC BLOOD PRESSURE: 66 MMHG | OXYGEN SATURATION: 96 % | TEMPERATURE: 97 F | BODY MASS INDEX: 21.25 KG/M2 | HEART RATE: 81 BPM

## 2021-03-13 LAB
POCT GLUCOSE: 121 MG/DL (ref 70–110)
POCT GLUCOSE: 140 MG/DL (ref 70–110)

## 2021-03-13 PROCEDURE — 99213 OFFICE O/P EST LOW 20 MIN: CPT | Mod: ,,, | Performed by: PSYCHIATRY & NEUROLOGY

## 2021-03-13 PROCEDURE — 25000003 PHARM REV CODE 250: Performed by: HOSPITALIST

## 2021-03-13 PROCEDURE — 99213 PR OFFICE/OUTPT VISIT, EST, LEVL III, 20-29 MIN: ICD-10-PCS | Mod: ,,, | Performed by: PSYCHIATRY & NEUROLOGY

## 2021-03-13 PROCEDURE — 93010 ELECTROCARDIOGRAM REPORT: CPT | Mod: ,,, | Performed by: INTERNAL MEDICINE

## 2021-03-13 PROCEDURE — 63600175 PHARM REV CODE 636 W HCPCS: Performed by: HOSPITALIST

## 2021-03-13 PROCEDURE — 93005 ELECTROCARDIOGRAM TRACING: CPT

## 2021-03-13 PROCEDURE — 93010 EKG 12-LEAD: ICD-10-PCS | Mod: ,,, | Performed by: INTERNAL MEDICINE

## 2021-03-13 PROCEDURE — G0378 HOSPITAL OBSERVATION PER HR: HCPCS

## 2021-03-13 RX ORDER — ENOXAPARIN SODIUM 100 MG/ML
40 INJECTION SUBCUTANEOUS EVERY 24 HOURS
Status: DISCONTINUED | OUTPATIENT
Start: 2021-03-13 | End: 2021-03-13 | Stop reason: HOSPADM

## 2021-03-13 RX ADMIN — HYDRALAZINE HYDROCHLORIDE 25 MG: 25 TABLET, FILM COATED ORAL at 01:03

## 2021-03-13 RX ADMIN — PREDNISONE 5 MG: 5 TABLET ORAL at 09:03

## 2021-03-13 RX ADMIN — FOLIC ACID 1000 MCG: 1 TABLET ORAL at 09:03

## 2021-03-13 RX ADMIN — NIFEDIPINE 30 MG: 30 TABLET, FILM COATED, EXTENDED RELEASE ORAL at 09:03

## 2021-03-13 RX ADMIN — CARVEDILOL 25 MG: 12.5 TABLET, FILM COATED ORAL at 09:03

## 2021-03-13 RX ADMIN — ATORVASTATIN CALCIUM 20 MG: 10 TABLET, FILM COATED ORAL at 09:03

## 2021-03-13 RX ADMIN — LEVOTHYROXINE SODIUM 50 MCG: 50 TABLET ORAL at 05:03

## 2021-03-13 RX ADMIN — CLONIDINE HYDROCHLORIDE 0.3 MG: 0.1 TABLET ORAL at 09:03

## 2021-03-13 RX ADMIN — GABAPENTIN 400 MG: 400 CAPSULE ORAL at 09:03

## 2021-03-13 RX ADMIN — HYDROCODONE BITARTRATE AND ACETAMINOPHEN 1 TABLET: 5; 325 TABLET ORAL at 09:03

## 2021-03-16 ENCOUNTER — HOSPITAL ENCOUNTER (OUTPATIENT)
Dept: RADIOLOGY | Facility: HOSPITAL | Age: 76
Discharge: HOME | End: 2021-03-16
Attending: INTERNAL MEDICINE
Payer: MEDICARE

## 2021-03-16 ENCOUNTER — OFFICE VISIT (OUTPATIENT)
Dept: RHEUMATOLOGY | Facility: CLINIC | Age: 76
End: 2021-03-16
Payer: MEDICARE

## 2021-03-16 VITALS
SYSTOLIC BLOOD PRESSURE: 160 MMHG | HEART RATE: 74 BPM | HEIGHT: 62 IN | BODY MASS INDEX: 21.83 KG/M2 | WEIGHT: 118.63 LBS | DIASTOLIC BLOOD PRESSURE: 77 MMHG

## 2021-03-16 DIAGNOSIS — D84.821 IMMUNOSUPPRESSION DUE TO CHRONIC STEROID USE: ICD-10-CM

## 2021-03-16 DIAGNOSIS — G72.9 MYOPATHY: ICD-10-CM

## 2021-03-16 DIAGNOSIS — Z79.52 IMMUNOSUPPRESSION DUE TO CHRONIC STEROID USE: ICD-10-CM

## 2021-03-16 DIAGNOSIS — T38.0X5A IMMUNOSUPPRESSION DUE TO CHRONIC STEROID USE: ICD-10-CM

## 2021-03-16 DIAGNOSIS — G89.29 CHRONIC LEFT HIP PAIN: ICD-10-CM

## 2021-03-16 DIAGNOSIS — D86.86 SARCOID ARTHROPATHY: ICD-10-CM

## 2021-03-16 DIAGNOSIS — D86.9 SARCOIDOSIS: Primary | ICD-10-CM

## 2021-03-16 DIAGNOSIS — R53.83 FATIGUE, UNSPECIFIED TYPE: ICD-10-CM

## 2021-03-16 DIAGNOSIS — M25.552 CHRONIC LEFT HIP PAIN: ICD-10-CM

## 2021-03-16 DIAGNOSIS — D84.9 IMMUNOSUPPRESSION: ICD-10-CM

## 2021-03-16 LAB — GABAPENTIN SERPLBLD-MCNC: 1.2 MCG/ML (ref 2–20)

## 2021-03-16 PROCEDURE — 99214 PR OFFICE/OUTPT VISIT, EST, LEVL IV, 30-39 MIN: ICD-10-PCS | Mod: 25,S$GLB,, | Performed by: INTERNAL MEDICINE

## 2021-03-16 PROCEDURE — 73502 XR HIP 2 VIEW LEFT: ICD-10-PCS | Mod: 26,LT,, | Performed by: RADIOLOGY

## 2021-03-16 PROCEDURE — 1157F PR ADVANCE CARE PLAN OR EQUIV PRESENT IN MEDICAL RECORD: ICD-10-PCS | Mod: S$GLB,,, | Performed by: INTERNAL MEDICINE

## 2021-03-16 PROCEDURE — 3078F PR MOST RECENT DIASTOLIC BLOOD PRESSURE < 80 MM HG: ICD-10-PCS | Mod: CPTII,S$GLB,, | Performed by: INTERNAL MEDICINE

## 2021-03-16 PROCEDURE — 96372 PR INJECTION,THERAP/PROPH/DIAG2ST, IM OR SUBCUT: ICD-10-PCS | Mod: S$GLB,,, | Performed by: INTERNAL MEDICINE

## 2021-03-16 PROCEDURE — 1159F MED LIST DOCD IN RCRD: CPT | Mod: S$GLB,,, | Performed by: INTERNAL MEDICINE

## 2021-03-16 PROCEDURE — 1157F ADVNC CARE PLAN IN RCRD: CPT | Mod: S$GLB,,, | Performed by: INTERNAL MEDICINE

## 2021-03-16 PROCEDURE — 96372 THER/PROPH/DIAG INJ SC/IM: CPT | Mod: S$GLB,,, | Performed by: INTERNAL MEDICINE

## 2021-03-16 PROCEDURE — 99999 PR PBB SHADOW E&M-EST. PATIENT-LVL IV: CPT | Mod: PBBFAC,,, | Performed by: INTERNAL MEDICINE

## 2021-03-16 PROCEDURE — 1125F AMNT PAIN NOTED PAIN PRSNT: CPT | Mod: S$GLB,,, | Performed by: INTERNAL MEDICINE

## 2021-03-16 PROCEDURE — 73502 X-RAY EXAM HIP UNI 2-3 VIEWS: CPT | Mod: 26,LT,, | Performed by: RADIOLOGY

## 2021-03-16 PROCEDURE — 73502 X-RAY EXAM HIP UNI 2-3 VIEWS: CPT | Mod: TC,LT

## 2021-03-16 PROCEDURE — 99214 OFFICE O/P EST MOD 30 MIN: CPT | Mod: 25,S$GLB,, | Performed by: INTERNAL MEDICINE

## 2021-03-16 PROCEDURE — 3078F DIAST BP <80 MM HG: CPT | Mod: CPTII,S$GLB,, | Performed by: INTERNAL MEDICINE

## 2021-03-16 PROCEDURE — 99999 PR PBB SHADOW E&M-EST. PATIENT-LVL IV: ICD-10-PCS | Mod: PBBFAC,,, | Performed by: INTERNAL MEDICINE

## 2021-03-16 PROCEDURE — 3077F PR MOST RECENT SYSTOLIC BLOOD PRESSURE >= 140 MM HG: ICD-10-PCS | Mod: CPTII,S$GLB,, | Performed by: INTERNAL MEDICINE

## 2021-03-16 PROCEDURE — 3072F LOW RISK FOR RETINOPATHY: CPT | Mod: S$GLB,,, | Performed by: INTERNAL MEDICINE

## 2021-03-16 PROCEDURE — 3072F PR LOW RISK FOR RETINOPATHY: ICD-10-PCS | Mod: S$GLB,,, | Performed by: INTERNAL MEDICINE

## 2021-03-16 PROCEDURE — 1125F PR PAIN SEVERITY QUANTIFIED, PAIN PRESENT: ICD-10-PCS | Mod: S$GLB,,, | Performed by: INTERNAL MEDICINE

## 2021-03-16 PROCEDURE — 1159F PR MEDICATION LIST DOCUMENTED IN MEDICAL RECORD: ICD-10-PCS | Mod: S$GLB,,, | Performed by: INTERNAL MEDICINE

## 2021-03-16 PROCEDURE — 3077F SYST BP >= 140 MM HG: CPT | Mod: CPTII,S$GLB,, | Performed by: INTERNAL MEDICINE

## 2021-03-16 RX ORDER — TRIAMCINOLONE ACETONIDE 40 MG/ML
80 INJECTION, SUSPENSION INTRA-ARTICULAR; INTRAMUSCULAR
Status: COMPLETED | OUTPATIENT
Start: 2021-03-16 | End: 2021-03-16

## 2021-03-16 RX ADMIN — TRIAMCINOLONE ACETONIDE 80 MG: 40 INJECTION, SUSPENSION INTRA-ARTICULAR; INTRAMUSCULAR at 10:03

## 2021-03-16 ASSESSMENT — ROUTINE ASSESSMENT OF PATIENT INDEX DATA (RAPID3)
AM STIFFNESS SCORE: 1, YES
TOTAL RAPID3 SCORE: 7.33
PAIN SCORE: 10
PATIENT GLOBAL ASSESSMENT SCORE: 7
MDHAQ FUNCTION SCORE: 1.5
FATIGUE SCORE: 8
PSYCHOLOGICAL DISTRESS SCORE: 3.3

## 2021-03-18 ENCOUNTER — OFFICE VISIT (OUTPATIENT)
Dept: FAMILY MEDICINE | Facility: CLINIC | Age: 76
End: 2021-03-18
Payer: MEDICARE

## 2021-03-18 VITALS
TEMPERATURE: 98 F | HEART RATE: 79 BPM | SYSTOLIC BLOOD PRESSURE: 136 MMHG | DIASTOLIC BLOOD PRESSURE: 78 MMHG | WEIGHT: 113 LBS | BODY MASS INDEX: 20.8 KG/M2 | HEIGHT: 62 IN | OXYGEN SATURATION: 98 %

## 2021-03-18 DIAGNOSIS — S01.81XD LACERATION OF OTHER PART OF HEAD WITHOUT FOREIGN BODY, SUBSEQUENT ENCOUNTER: ICD-10-CM
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
## 2021-03-18 DIAGNOSIS — M48.062 LUMBAR STENOSIS WITH NEUROGENIC CLAUDICATION: ICD-10-CM

## 2021-03-18 DIAGNOSIS — R20.0 RIGHT FACIAL NUMBNESS: ICD-10-CM

## 2021-03-18 DIAGNOSIS — E11.65 CONTROLLED TYPE 2 DIABETES MELLITUS WITH HYPERGLYCEMIA, WITHOUT LONG-TERM CURRENT USE OF INSULIN: ICD-10-CM

## 2021-03-18 PROCEDURE — 1159F PR MEDICATION LIST DOCUMENTED IN MEDICAL RECORD: ICD-10-PCS | Mod: S$GLB,,, | Performed by: FAMILY MEDICINE

## 2021-03-18 PROCEDURE — 99215 PR OFFICE/OUTPT VISIT, EST, LEVL V, 40-54 MIN: ICD-10-PCS | Mod: S$GLB,,, | Performed by: FAMILY MEDICINE

## 2021-03-18 PROCEDURE — 3072F LOW RISK FOR RETINOPATHY: CPT | Mod: S$GLB,,, | Performed by: FAMILY MEDICINE

## 2021-03-18 PROCEDURE — 1125F AMNT PAIN NOTED PAIN PRSNT: CPT | Mod: S$GLB,,, | Performed by: FAMILY MEDICINE

## 2021-03-18 PROCEDURE — 3044F HG A1C LEVEL LT 7.0%: CPT | Mod: CPTII,S$GLB,, | Performed by: FAMILY MEDICINE

## 2021-03-18 PROCEDURE — 3288F PR FALLS RISK ASSESSMENT DOCUMENTED: ICD-10-PCS | Mod: CPTII,S$GLB,, | Performed by: FAMILY MEDICINE

## 2021-03-18 PROCEDURE — 3044F PR MOST RECENT HEMOGLOBIN A1C LEVEL <7.0%: ICD-10-PCS | Mod: CPTII,S$GLB,, | Performed by: FAMILY MEDICINE

## 2021-03-18 PROCEDURE — 3075F SYST BP GE 130 - 139MM HG: CPT | Mod: CPTII,S$GLB,, | Performed by: FAMILY MEDICINE

## 2021-03-18 PROCEDURE — 1100F PR PT FALLS ASSESS DOC 2+ FALLS/FALL W/INJURY/YR: ICD-10-PCS | Mod: CPTII,S$GLB,, | Performed by: FAMILY MEDICINE

## 2021-03-18 PROCEDURE — 99999 PR PBB SHADOW E&M-EST. PATIENT-LVL V: ICD-10-PCS | Mod: PBBFAC,,, | Performed by: FAMILY MEDICINE

## 2021-03-18 PROCEDURE — 99215 OFFICE O/P EST HI 40 MIN: CPT | Mod: S$GLB,,, | Performed by: FAMILY MEDICINE

## 2021-03-18 PROCEDURE — 1100F PTFALLS ASSESS-DOCD GE2>/YR: CPT | Mod: CPTII,S$GLB,, | Performed by: FAMILY MEDICINE

## 2021-03-18 PROCEDURE — 3078F PR MOST RECENT DIASTOLIC BLOOD PRESSURE < 80 MM HG: ICD-10-PCS | Mod: CPTII,S$GLB,, | Performed by: FAMILY MEDICINE

## 2021-03-18 PROCEDURE — 3078F DIAST BP <80 MM HG: CPT | Mod: CPTII,S$GLB,, | Performed by: FAMILY MEDICINE

## 2021-03-18 PROCEDURE — 99999 PR PBB SHADOW E&M-EST. PATIENT-LVL V: CPT | Mod: PBBFAC,,, | Performed by: FAMILY MEDICINE

## 2021-03-18 PROCEDURE — 1157F PR ADVANCE CARE PLAN OR EQUIV PRESENT IN MEDICAL RECORD: ICD-10-PCS | Mod: S$GLB,,, | Performed by: FAMILY MEDICINE

## 2021-03-18 PROCEDURE — 3072F PR LOW RISK FOR RETINOPATHY: ICD-10-PCS | Mod: S$GLB,,, | Performed by: FAMILY MEDICINE

## 2021-03-18 PROCEDURE — 3288F FALL RISK ASSESSMENT DOCD: CPT | Mod: CPTII,S$GLB,, | Performed by: FAMILY MEDICINE

## 2021-03-18 PROCEDURE — 3075F PR MOST RECENT SYSTOLIC BLOOD PRESS GE 130-139MM HG: ICD-10-PCS | Mod: CPTII,S$GLB,, | Performed by: FAMILY MEDICINE

## 2021-03-18 PROCEDURE — 1157F ADVNC CARE PLAN IN RCRD: CPT | Mod: S$GLB,,, | Performed by: FAMILY MEDICINE

## 2021-03-18 PROCEDURE — 1125F PR PAIN SEVERITY QUANTIFIED, PAIN PRESENT: ICD-10-PCS | Mod: S$GLB,,, | Performed by: FAMILY MEDICINE

## 2021-03-18 PROCEDURE — 1159F MED LIST DOCD IN RCRD: CPT | Mod: S$GLB,,, | Performed by: FAMILY MEDICINE

## 2021-03-19 ENCOUNTER — TELEPHONE (OUTPATIENT)
Dept: FAMILY MEDICINE | Facility: CLINIC | Age: 76
End: 2021-03-19

## 2021-03-19 ENCOUNTER — INFUSION (OUTPATIENT)
Dept: INFUSION THERAPY | Facility: HOSPITAL | Age: 76
End: 2021-03-19
Attending: INTERNAL MEDICINE
Payer: MEDICARE

## 2021-03-19 ENCOUNTER — PATIENT MESSAGE (OUTPATIENT)
Dept: FAMILY MEDICINE | Facility: CLINIC | Age: 76
End: 2021-03-19

## 2021-03-19 VITALS
OXYGEN SATURATION: 98 % | TEMPERATURE: 98 F | HEART RATE: 83 BPM | RESPIRATION RATE: 17 BRPM | SYSTOLIC BLOOD PRESSURE: 162 MMHG | DIASTOLIC BLOOD PRESSURE: 78 MMHG

## 2021-03-19 DIAGNOSIS — N18.9 ANEMIA IN CHRONIC KIDNEY DISEASE, UNSPECIFIED CKD STAGE: ICD-10-CM

## 2021-03-19 DIAGNOSIS — D63.1 ANEMIA IN CHRONIC KIDNEY DISEASE, UNSPECIFIED CKD STAGE: ICD-10-CM

## 2021-03-19 DIAGNOSIS — I10 HYPERTENSION: Primary | ICD-10-CM

## 2021-03-19 DIAGNOSIS — Z53.1 REFUSAL OF BLOOD TRANSFUSIONS AS PATIENT IS JEHOVAH'S WITNESS: ICD-10-CM

## 2021-03-19 DIAGNOSIS — D50.9 IRON DEFICIENCY ANEMIA, UNSPECIFIED IRON DEFICIENCY ANEMIA TYPE: ICD-10-CM

## 2021-03-19 PROCEDURE — 25000003 PHARM REV CODE 250: Performed by: INTERNAL MEDICINE

## 2021-03-19 PROCEDURE — 63600175 PHARM REV CODE 636 W HCPCS: Performed by: INTERNAL MEDICINE

## 2021-03-19 PROCEDURE — 96372 THER/PROPH/DIAG INJ SC/IM: CPT

## 2021-03-19 RX ORDER — CLONIDINE HYDROCHLORIDE 0.1 MG/1
0.1 TABLET ORAL ONCE
Status: COMPLETED | OUTPATIENT
Start: 2021-03-19 | End: 2021-03-19

## 2021-03-19 RX ADMIN — EPOETIN ALFA-EPBX 20000 UNITS: 20000 INJECTION, SOLUTION INTRAVENOUS; SUBCUTANEOUS at 12:03

## 2021-03-19 RX ADMIN — CLONIDINE HYDROCHLORIDE 0.1 MG: 0.1 TABLET ORAL at 11:03

## 2021-03-22 NOTE — Clinical Note
CBC every 2weeks prior to procrit injections ( standing orders)  Ferritin, TIBC and FE prior to f/u 2mos  0

## 2021-03-24 DIAGNOSIS — I10 ESSENTIAL HYPERTENSION: Chronic | ICD-10-CM

## 2021-03-26 RX ORDER — CARVEDILOL 25 MG/1
25 TABLET ORAL 2 TIMES DAILY
Qty: 180 TABLET | Refills: 0 | Status: SHIPPED | OUTPATIENT
Start: 2021-03-26 | End: 2021-03-26 | Stop reason: SDUPTHER

## 2021-03-26 RX ORDER — CARVEDILOL 25 MG/1
25 TABLET ORAL 2 TIMES DAILY
Qty: 180 TABLET | Refills: 0 | Status: SHIPPED | OUTPATIENT
Start: 2021-03-26 | End: 2021-06-08 | Stop reason: SDUPTHER

## 2021-03-29 ENCOUNTER — OFFICE VISIT (OUTPATIENT)
Dept: OPHTHALMOLOGY | Facility: CLINIC | Age: 76
End: 2021-03-29
Attending: OPHTHALMOLOGY
Payer: MEDICARE

## 2021-03-29 DIAGNOSIS — H04.123 DRY EYE SYNDROME, BILATERAL: Primary | ICD-10-CM

## 2021-03-29 DIAGNOSIS — H34.8120 HEMISPHERIC RETINAL VEIN OCCLUSION WITH MACULAR EDEMA OF LEFT EYE: ICD-10-CM

## 2021-03-29 DIAGNOSIS — H17.9 CORNEAL SCAR, RIGHT EYE: ICD-10-CM

## 2021-03-29 DIAGNOSIS — H10.13 ALLERGIC CONJUNCTIVITIS OF BOTH EYES: ICD-10-CM

## 2021-03-29 DIAGNOSIS — E11.9 DM TYPE 2 WITHOUT RETINOPATHY: ICD-10-CM

## 2021-03-29 PROCEDURE — 92014 COMPRE OPH EXAM EST PT 1/>: CPT | Mod: S$GLB,,, | Performed by: OPHTHALMOLOGY

## 2021-03-29 PROCEDURE — 3288F FALL RISK ASSESSMENT DOCD: CPT | Mod: CPTII,S$GLB,, | Performed by: OPHTHALMOLOGY

## 2021-03-29 PROCEDURE — 1157F ADVNC CARE PLAN IN RCRD: CPT | Mod: S$GLB,,, | Performed by: OPHTHALMOLOGY

## 2021-03-29 PROCEDURE — 1101F PR PT FALLS ASSESS DOC 0-1 FALLS W/OUT INJ PAST YR: ICD-10-PCS | Mod: CPTII,S$GLB,, | Performed by: OPHTHALMOLOGY

## 2021-03-29 PROCEDURE — 92014 PR EYE EXAM, EST PATIENT,COMPREHESV: ICD-10-PCS | Mod: S$GLB,,, | Performed by: OPHTHALMOLOGY

## 2021-03-29 PROCEDURE — 1101F PT FALLS ASSESS-DOCD LE1/YR: CPT | Mod: CPTII,S$GLB,, | Performed by: OPHTHALMOLOGY

## 2021-03-29 PROCEDURE — 2023F DILAT RTA XM W/O RTNOPTHY: CPT | Mod: S$GLB,,, | Performed by: OPHTHALMOLOGY

## 2021-03-29 PROCEDURE — 1126F PR PAIN SEVERITY QUANTIFIED, NO PAIN PRESENT: ICD-10-PCS | Mod: S$GLB,,, | Performed by: OPHTHALMOLOGY

## 2021-03-29 PROCEDURE — 3288F PR FALLS RISK ASSESSMENT DOCUMENTED: ICD-10-PCS | Mod: CPTII,S$GLB,, | Performed by: OPHTHALMOLOGY

## 2021-03-29 PROCEDURE — 1157F PR ADVANCE CARE PLAN OR EQUIV PRESENT IN MEDICAL RECORD: ICD-10-PCS | Mod: S$GLB,,, | Performed by: OPHTHALMOLOGY

## 2021-03-29 PROCEDURE — 99999 PR PBB SHADOW E&M-EST. PATIENT-LVL III: CPT | Mod: PBBFAC,,, | Performed by: OPHTHALMOLOGY

## 2021-03-29 PROCEDURE — 99999 PR PBB SHADOW E&M-EST. PATIENT-LVL III: ICD-10-PCS | Mod: PBBFAC,,, | Performed by: OPHTHALMOLOGY

## 2021-03-29 PROCEDURE — 92134 POSTERIOR SEGMENT OCT RETINA (OCULAR COHERENCE TOMOGRAPHY)-BOTH EYES: ICD-10-PCS | Mod: S$GLB,,, | Performed by: OPHTHALMOLOGY

## 2021-03-29 PROCEDURE — 1126F AMNT PAIN NOTED NONE PRSNT: CPT | Mod: S$GLB,,, | Performed by: OPHTHALMOLOGY

## 2021-03-29 PROCEDURE — 2023F PR DILATED RETINAL EXAM W/O EVID OF RETINOPATHY: ICD-10-PCS | Mod: S$GLB,,, | Performed by: OPHTHALMOLOGY

## 2021-03-29 PROCEDURE — 92134 CPTRZ OPH DX IMG PST SGM RTA: CPT | Mod: S$GLB,,, | Performed by: OPHTHALMOLOGY

## 2021-03-29 RX ORDER — OLOPATADINE HYDROCHLORIDE 1 MG/ML
1 SOLUTION/ DROPS OPHTHALMIC DAILY PRN
Qty: 5 ML | Refills: 1 | Status: SHIPPED | OUTPATIENT
Start: 2021-03-29 | End: 2021-08-23 | Stop reason: SDUPTHER

## 2021-03-29 RX ORDER — HYDROCODONE BITARTRATE AND ACETAMINOPHEN 5; 325 MG/1; MG/1
TABLET ORAL
Status: ON HOLD | COMMUNITY
Start: 2021-03-13 | End: 2021-07-10 | Stop reason: HOSPADM

## 2021-03-30 ENCOUNTER — TELEPHONE (OUTPATIENT)
Dept: PAIN MEDICINE | Facility: CLINIC | Age: 76
End: 2021-03-30

## 2021-03-31 ENCOUNTER — OFFICE VISIT (OUTPATIENT)
Dept: PAIN MEDICINE | Facility: CLINIC | Age: 76
End: 2021-03-31
Payer: MEDICARE

## 2021-03-31 VITALS
WEIGHT: 112 LBS | HEIGHT: 62 IN | DIASTOLIC BLOOD PRESSURE: 82 MMHG | RESPIRATION RATE: 18 BRPM | BODY MASS INDEX: 20.61 KG/M2 | HEART RATE: 88 BPM | SYSTOLIC BLOOD PRESSURE: 159 MMHG | TEMPERATURE: 98 F

## 2021-03-31 DIAGNOSIS — M53.3 SACROILIAC JOINT PAIN: ICD-10-CM

## 2021-03-31 DIAGNOSIS — G89.4 CHRONIC PAIN DISORDER: ICD-10-CM

## 2021-03-31 DIAGNOSIS — M47.816 LUMBAR SPONDYLOSIS: ICD-10-CM

## 2021-03-31 DIAGNOSIS — M51.36 DDD (DEGENERATIVE DISC DISEASE), LUMBAR: ICD-10-CM

## 2021-03-31 DIAGNOSIS — M16.12 PRIMARY OSTEOARTHRITIS OF LEFT HIP: ICD-10-CM

## 2021-03-31 DIAGNOSIS — G89.29 CHRONIC LEFT HIP PAIN: Primary | ICD-10-CM

## 2021-03-31 DIAGNOSIS — M25.552 CHRONIC LEFT HIP PAIN: Primary | ICD-10-CM

## 2021-03-31 DIAGNOSIS — M54.16 LUMBAR RADICULOPATHY: ICD-10-CM

## 2021-03-31 PROCEDURE — 1157F PR ADVANCE CARE PLAN OR EQUIV PRESENT IN MEDICAL RECORD: ICD-10-PCS | Mod: S$GLB,,, | Performed by: NURSE PRACTITIONER

## 2021-03-31 PROCEDURE — 99999 PR PBB SHADOW E&M-EST. PATIENT-LVL IV: CPT | Mod: PBBFAC,,, | Performed by: NURSE PRACTITIONER

## 2021-03-31 PROCEDURE — 99213 OFFICE O/P EST LOW 20 MIN: CPT | Mod: S$GLB,,, | Performed by: NURSE PRACTITIONER

## 2021-03-31 PROCEDURE — 99213 PR OFFICE/OUTPT VISIT, EST, LEVL III, 20-29 MIN: ICD-10-PCS | Mod: S$GLB,,, | Performed by: NURSE PRACTITIONER

## 2021-03-31 PROCEDURE — 1159F MED LIST DOCD IN RCRD: CPT | Mod: S$GLB,,, | Performed by: NURSE PRACTITIONER

## 2021-03-31 PROCEDURE — 3072F LOW RISK FOR RETINOPATHY: CPT | Mod: S$GLB,,, | Performed by: NURSE PRACTITIONER

## 2021-03-31 PROCEDURE — 3072F PR LOW RISK FOR RETINOPATHY: ICD-10-PCS | Mod: S$GLB,,, | Performed by: NURSE PRACTITIONER

## 2021-03-31 PROCEDURE — 3288F PR FALLS RISK ASSESSMENT DOCUMENTED: ICD-10-PCS | Mod: CPTII,S$GLB,, | Performed by: NURSE PRACTITIONER

## 2021-03-31 PROCEDURE — 1125F PR PAIN SEVERITY QUANTIFIED, PAIN PRESENT: ICD-10-PCS | Mod: S$GLB,,, | Performed by: NURSE PRACTITIONER

## 2021-03-31 PROCEDURE — 3077F PR MOST RECENT SYSTOLIC BLOOD PRESSURE >= 140 MM HG: ICD-10-PCS | Mod: CPTII,S$GLB,, | Performed by: NURSE PRACTITIONER

## 2021-03-31 PROCEDURE — 1159F PR MEDICATION LIST DOCUMENTED IN MEDICAL RECORD: ICD-10-PCS | Mod: S$GLB,,, | Performed by: NURSE PRACTITIONER

## 2021-03-31 PROCEDURE — 3079F PR MOST RECENT DIASTOLIC BLOOD PRESSURE 80-89 MM HG: ICD-10-PCS | Mod: CPTII,S$GLB,, | Performed by: NURSE PRACTITIONER

## 2021-03-31 PROCEDURE — 3288F FALL RISK ASSESSMENT DOCD: CPT | Mod: CPTII,S$GLB,, | Performed by: NURSE PRACTITIONER

## 2021-03-31 PROCEDURE — 3077F SYST BP >= 140 MM HG: CPT | Mod: CPTII,S$GLB,, | Performed by: NURSE PRACTITIONER

## 2021-03-31 PROCEDURE — 3079F DIAST BP 80-89 MM HG: CPT | Mod: CPTII,S$GLB,, | Performed by: NURSE PRACTITIONER

## 2021-03-31 PROCEDURE — 1125F AMNT PAIN NOTED PAIN PRSNT: CPT | Mod: S$GLB,,, | Performed by: NURSE PRACTITIONER

## 2021-03-31 PROCEDURE — 1101F PR PT FALLS ASSESS DOC 0-1 FALLS W/OUT INJ PAST YR: ICD-10-PCS | Mod: CPTII,S$GLB,, | Performed by: NURSE PRACTITIONER

## 2021-03-31 PROCEDURE — 1157F ADVNC CARE PLAN IN RCRD: CPT | Mod: S$GLB,,, | Performed by: NURSE PRACTITIONER

## 2021-03-31 PROCEDURE — 99999 PR PBB SHADOW E&M-EST. PATIENT-LVL IV: ICD-10-PCS | Mod: PBBFAC,,, | Performed by: NURSE PRACTITIONER

## 2021-03-31 PROCEDURE — 1101F PT FALLS ASSESS-DOCD LE1/YR: CPT | Mod: CPTII,S$GLB,, | Performed by: NURSE PRACTITIONER

## 2021-04-01 ENCOUNTER — INFUSION (OUTPATIENT)
Dept: INFUSION THERAPY | Facility: HOSPITAL | Age: 76
End: 2021-04-01
Attending: INTERNAL MEDICINE
Payer: MEDICARE

## 2021-04-01 VITALS
OXYGEN SATURATION: 96 % | HEART RATE: 82 BPM | RESPIRATION RATE: 16 BRPM | TEMPERATURE: 98 F | DIASTOLIC BLOOD PRESSURE: 71 MMHG | SYSTOLIC BLOOD PRESSURE: 144 MMHG

## 2021-04-01 DIAGNOSIS — N18.9 ANEMIA IN CHRONIC KIDNEY DISEASE, UNSPECIFIED CKD STAGE: Primary | ICD-10-CM

## 2021-04-01 DIAGNOSIS — D63.1 ANEMIA IN CHRONIC KIDNEY DISEASE, UNSPECIFIED CKD STAGE: Primary | ICD-10-CM

## 2021-04-01 DIAGNOSIS — Z53.1 REFUSAL OF BLOOD TRANSFUSIONS AS PATIENT IS JEHOVAH'S WITNESS: ICD-10-CM

## 2021-04-01 DIAGNOSIS — D50.9 IRON DEFICIENCY ANEMIA, UNSPECIFIED IRON DEFICIENCY ANEMIA TYPE: ICD-10-CM

## 2021-04-01 PROCEDURE — 63600175 PHARM REV CODE 636 W HCPCS: Performed by: INTERNAL MEDICINE

## 2021-04-01 PROCEDURE — 96372 THER/PROPH/DIAG INJ SC/IM: CPT

## 2021-04-01 RX ADMIN — EPOETIN ALFA-EPBX 20000 UNITS: 20000 INJECTION, SOLUTION INTRAVENOUS; SUBCUTANEOUS at 11:04

## 2021-04-05 DIAGNOSIS — D86.9 SARCOIDOSIS: ICD-10-CM

## 2021-04-05 DIAGNOSIS — G72.9 MYOPATHY: ICD-10-CM

## 2021-04-05 DIAGNOSIS — M79.10 MYALGIA: ICD-10-CM

## 2021-04-06 ENCOUNTER — PATIENT MESSAGE (OUTPATIENT)
Dept: FAMILY MEDICINE | Facility: CLINIC | Age: 76
End: 2021-04-06

## 2021-04-06 RX ORDER — PREDNISONE 5 MG/1
5 TABLET ORAL DAILY
Qty: 90 TABLET | Refills: 0 | Status: SHIPPED | OUTPATIENT
Start: 2021-04-06 | End: 2021-11-08 | Stop reason: SDUPTHER

## 2021-04-06 RX ORDER — TIZANIDINE 2 MG/1
4 TABLET ORAL EVERY 8 HOURS PRN
Qty: 180 TABLET | Refills: 0 | Status: SHIPPED | OUTPATIENT
Start: 2021-04-06 | End: 2021-05-17

## 2021-04-14 ENCOUNTER — OFFICE VISIT (OUTPATIENT)
Dept: FAMILY MEDICINE | Facility: CLINIC | Age: 76
End: 2021-04-14
Payer: MEDICARE

## 2021-04-14 VITALS
HEIGHT: 62 IN | TEMPERATURE: 98 F | BODY MASS INDEX: 21.65 KG/M2 | OXYGEN SATURATION: 97 % | SYSTOLIC BLOOD PRESSURE: 106 MMHG | DIASTOLIC BLOOD PRESSURE: 62 MMHG | WEIGHT: 117.63 LBS | HEART RATE: 81 BPM

## 2021-04-14 DIAGNOSIS — E11.3292 CONTROLLED TYPE 2 DIABETES MELLITUS WITH LEFT EYE AFFECTED BY MILD NONPROLIFERATIVE RETINOPATHY WITHOUT MACULAR EDEMA, WITHOUT LONG-TERM CURRENT USE OF INSULIN: Primary | Chronic | ICD-10-CM

## 2021-04-14 DIAGNOSIS — F11.90 CHRONIC, CONTINUOUS USE OF OPIOIDS: ICD-10-CM

## 2021-04-14 DIAGNOSIS — I10 ESSENTIAL HYPERTENSION: Chronic | ICD-10-CM

## 2021-04-14 DIAGNOSIS — M54.42 CHRONIC BILATERAL LOW BACK PAIN WITH LEFT-SIDED SCIATICA: ICD-10-CM

## 2021-04-14 DIAGNOSIS — M16.12 PRIMARY OSTEOARTHRITIS OF LEFT HIP: ICD-10-CM

## 2021-04-14 DIAGNOSIS — G89.29 CHRONIC BILATERAL LOW BACK PAIN WITH LEFT-SIDED SCIATICA: ICD-10-CM

## 2021-04-14 PROCEDURE — 3044F PR MOST RECENT HEMOGLOBIN A1C LEVEL <7.0%: ICD-10-PCS | Mod: CPTII,S$GLB,, | Performed by: FAMILY MEDICINE

## 2021-04-14 PROCEDURE — 3288F PR FALLS RISK ASSESSMENT DOCUMENTED: ICD-10-PCS | Mod: CPTII,S$GLB,, | Performed by: FAMILY MEDICINE

## 2021-04-14 PROCEDURE — 1157F ADVNC CARE PLAN IN RCRD: CPT | Mod: S$GLB,,, | Performed by: FAMILY MEDICINE

## 2021-04-14 PROCEDURE — 99999 PR PBB SHADOW E&M-EST. PATIENT-LVL V: CPT | Mod: PBBFAC,,, | Performed by: FAMILY MEDICINE

## 2021-04-14 PROCEDURE — 99214 OFFICE O/P EST MOD 30 MIN: CPT | Mod: S$GLB,,, | Performed by: FAMILY MEDICINE

## 2021-04-14 PROCEDURE — 3072F LOW RISK FOR RETINOPATHY: CPT | Mod: S$GLB,,, | Performed by: FAMILY MEDICINE

## 2021-04-14 PROCEDURE — 99999 PR PBB SHADOW E&M-EST. PATIENT-LVL V: ICD-10-PCS | Mod: PBBFAC,,, | Performed by: FAMILY MEDICINE

## 2021-04-14 PROCEDURE — 99214 PR OFFICE/OUTPT VISIT, EST, LEVL IV, 30-39 MIN: ICD-10-PCS | Mod: S$GLB,,, | Performed by: FAMILY MEDICINE

## 2021-04-14 PROCEDURE — 1126F AMNT PAIN NOTED NONE PRSNT: CPT | Mod: S$GLB,,, | Performed by: FAMILY MEDICINE

## 2021-04-14 PROCEDURE — 1159F MED LIST DOCD IN RCRD: CPT | Mod: S$GLB,,, | Performed by: FAMILY MEDICINE

## 2021-04-14 PROCEDURE — 1159F PR MEDICATION LIST DOCUMENTED IN MEDICAL RECORD: ICD-10-PCS | Mod: S$GLB,,, | Performed by: FAMILY MEDICINE

## 2021-04-14 PROCEDURE — 1100F PR PT FALLS ASSESS DOC 2+ FALLS/FALL W/INJURY/YR: ICD-10-PCS | Mod: CPTII,S$GLB,, | Performed by: FAMILY MEDICINE

## 2021-04-14 PROCEDURE — 3072F PR LOW RISK FOR RETINOPATHY: ICD-10-PCS | Mod: S$GLB,,, | Performed by: FAMILY MEDICINE

## 2021-04-14 PROCEDURE — 1157F PR ADVANCE CARE PLAN OR EQUIV PRESENT IN MEDICAL RECORD: ICD-10-PCS | Mod: S$GLB,,, | Performed by: FAMILY MEDICINE

## 2021-04-14 PROCEDURE — 3288F FALL RISK ASSESSMENT DOCD: CPT | Mod: CPTII,S$GLB,, | Performed by: FAMILY MEDICINE

## 2021-04-14 PROCEDURE — 1100F PTFALLS ASSESS-DOCD GE2>/YR: CPT | Mod: CPTII,S$GLB,, | Performed by: FAMILY MEDICINE

## 2021-04-14 PROCEDURE — 1126F PR PAIN SEVERITY QUANTIFIED, NO PAIN PRESENT: ICD-10-PCS | Mod: S$GLB,,, | Performed by: FAMILY MEDICINE

## 2021-04-14 PROCEDURE — 3044F HG A1C LEVEL LT 7.0%: CPT | Mod: CPTII,S$GLB,, | Performed by: FAMILY MEDICINE

## 2021-04-15 ENCOUNTER — PATIENT MESSAGE (OUTPATIENT)
Dept: FAMILY MEDICINE | Facility: CLINIC | Age: 76
End: 2021-04-15

## 2021-04-15 DIAGNOSIS — M51.36 DEGENERATIVE DISC DISEASE, LUMBAR: ICD-10-CM

## 2021-04-16 ENCOUNTER — INFUSION (OUTPATIENT)
Dept: INFUSION THERAPY | Facility: HOSPITAL | Age: 76
End: 2021-04-16
Attending: INTERNAL MEDICINE
Payer: MEDICARE

## 2021-04-16 VITALS
DIASTOLIC BLOOD PRESSURE: 74 MMHG | HEART RATE: 80 BPM | RESPIRATION RATE: 16 BRPM | SYSTOLIC BLOOD PRESSURE: 148 MMHG | OXYGEN SATURATION: 97 % | TEMPERATURE: 98 F

## 2021-04-16 DIAGNOSIS — N18.9 ANEMIA IN CHRONIC KIDNEY DISEASE, UNSPECIFIED CKD STAGE: Primary | ICD-10-CM

## 2021-04-16 DIAGNOSIS — D63.1 ANEMIA IN CHRONIC KIDNEY DISEASE, UNSPECIFIED CKD STAGE: Primary | ICD-10-CM

## 2021-04-16 DIAGNOSIS — Z53.1 REFUSAL OF BLOOD TRANSFUSIONS AS PATIENT IS JEHOVAH'S WITNESS: ICD-10-CM

## 2021-04-16 DIAGNOSIS — D50.9 IRON DEFICIENCY ANEMIA, UNSPECIFIED IRON DEFICIENCY ANEMIA TYPE: ICD-10-CM

## 2021-04-16 PROCEDURE — 63600175 PHARM REV CODE 636 W HCPCS: Performed by: INTERNAL MEDICINE

## 2021-04-16 PROCEDURE — 96372 THER/PROPH/DIAG INJ SC/IM: CPT

## 2021-04-16 RX ORDER — HYDROCODONE BITARTRATE AND ACETAMINOPHEN 5; 325 MG/1; MG/1
TABLET ORAL
Status: CANCELLED | OUTPATIENT
Start: 2021-04-16

## 2021-04-16 RX ORDER — HYDROCODONE BITARTRATE AND ACETAMINOPHEN 5; 325 MG/1; MG/1
1 TABLET ORAL EVERY 6 HOURS PRN
Qty: 90 TABLET | Refills: 0 | Status: SHIPPED | OUTPATIENT
Start: 2021-04-16 | End: 2021-05-19 | Stop reason: SDUPTHER

## 2021-04-16 RX ADMIN — EPOETIN ALFA-EPBX 20000 UNITS: 20000 INJECTION, SOLUTION INTRAVENOUS; SUBCUTANEOUS at 11:04

## 2021-04-21 ENCOUNTER — PATIENT MESSAGE (OUTPATIENT)
Dept: FAMILY MEDICINE | Facility: CLINIC | Age: 76
End: 2021-04-21

## 2021-04-30 ENCOUNTER — INFUSION (OUTPATIENT)
Dept: INFUSION THERAPY | Facility: HOSPITAL | Age: 76
End: 2021-04-30
Attending: INTERNAL MEDICINE
Payer: MEDICARE

## 2021-04-30 VITALS
TEMPERATURE: 97 F | DIASTOLIC BLOOD PRESSURE: 57 MMHG | RESPIRATION RATE: 16 BRPM | OXYGEN SATURATION: 96 % | SYSTOLIC BLOOD PRESSURE: 101 MMHG | HEART RATE: 81 BPM

## 2021-04-30 DIAGNOSIS — Z53.1 REFUSAL OF BLOOD TRANSFUSIONS AS PATIENT IS JEHOVAH'S WITNESS: ICD-10-CM

## 2021-04-30 DIAGNOSIS — D50.9 IRON DEFICIENCY ANEMIA, UNSPECIFIED IRON DEFICIENCY ANEMIA TYPE: ICD-10-CM

## 2021-04-30 DIAGNOSIS — N18.9 ANEMIA IN CHRONIC KIDNEY DISEASE, UNSPECIFIED CKD STAGE: Primary | ICD-10-CM

## 2021-04-30 DIAGNOSIS — D63.1 ANEMIA IN CHRONIC KIDNEY DISEASE, UNSPECIFIED CKD STAGE: Primary | ICD-10-CM

## 2021-04-30 PROCEDURE — 96372 THER/PROPH/DIAG INJ SC/IM: CPT

## 2021-04-30 PROCEDURE — 63600175 PHARM REV CODE 636 W HCPCS: Performed by: INTERNAL MEDICINE

## 2021-04-30 RX ADMIN — EPOETIN ALFA-EPBX 20000 UNITS: 20000 INJECTION, SOLUTION INTRAVENOUS; SUBCUTANEOUS at 11:04

## 2021-05-03 ENCOUNTER — OFFICE VISIT (OUTPATIENT)
Dept: OPHTHALMOLOGY | Facility: CLINIC | Age: 76
End: 2021-05-03
Payer: MEDICARE

## 2021-05-03 ENCOUNTER — OFFICE VISIT (OUTPATIENT)
Dept: HEMATOLOGY/ONCOLOGY | Facility: CLINIC | Age: 76
End: 2021-05-03
Payer: MEDICARE

## 2021-05-03 VITALS
OXYGEN SATURATION: 97 % | TEMPERATURE: 99 F | BODY MASS INDEX: 21.75 KG/M2 | SYSTOLIC BLOOD PRESSURE: 171 MMHG | HEIGHT: 62 IN | HEART RATE: 94 BPM | DIASTOLIC BLOOD PRESSURE: 84 MMHG | WEIGHT: 118.19 LBS

## 2021-05-03 DIAGNOSIS — N18.9 ANEMIA IN CHRONIC KIDNEY DISEASE, UNSPECIFIED CKD STAGE: Primary | ICD-10-CM

## 2021-05-03 DIAGNOSIS — H17.9 CORNEAL SCAR, RIGHT EYE: ICD-10-CM

## 2021-05-03 DIAGNOSIS — Z96.1 PSEUDOPHAKIA: ICD-10-CM

## 2021-05-03 DIAGNOSIS — D63.1 ANEMIA IN CHRONIC KIDNEY DISEASE, UNSPECIFIED CKD STAGE: Primary | ICD-10-CM

## 2021-05-03 DIAGNOSIS — N18.9 CHRONIC KIDNEY DISEASE, UNSPECIFIED CKD STAGE: ICD-10-CM

## 2021-05-03 DIAGNOSIS — G89.4 CHRONIC PAIN SYNDROME: ICD-10-CM

## 2021-05-03 DIAGNOSIS — H52.7 REFRACTIVE ERROR: ICD-10-CM

## 2021-05-03 DIAGNOSIS — H04.123 DRY EYE SYNDROME, BILATERAL: Primary | ICD-10-CM

## 2021-05-03 PROCEDURE — 99499 UNLISTED E&M SERVICE: CPT | Mod: S$GLB,,, | Performed by: INTERNAL MEDICINE

## 2021-05-03 PROCEDURE — 3072F PR LOW RISK FOR RETINOPATHY: ICD-10-PCS | Mod: S$GLB,,, | Performed by: INTERNAL MEDICINE

## 2021-05-03 PROCEDURE — 3077F SYST BP >= 140 MM HG: CPT | Mod: CPTII,S$GLB,, | Performed by: INTERNAL MEDICINE

## 2021-05-03 PROCEDURE — 3072F LOW RISK FOR RETINOPATHY: CPT | Mod: S$GLB,,, | Performed by: INTERNAL MEDICINE

## 2021-05-03 PROCEDURE — 1157F ADVNC CARE PLAN IN RCRD: CPT | Mod: S$GLB,,, | Performed by: OPHTHALMOLOGY

## 2021-05-03 PROCEDURE — 99214 OFFICE O/P EST MOD 30 MIN: CPT | Mod: S$GLB,,, | Performed by: INTERNAL MEDICINE

## 2021-05-03 PROCEDURE — 1157F PR ADVANCE CARE PLAN OR EQUIV PRESENT IN MEDICAL RECORD: ICD-10-PCS | Mod: S$GLB,,, | Performed by: INTERNAL MEDICINE

## 2021-05-03 PROCEDURE — 99499 RISK ADDL DX/OHS AUDIT: ICD-10-PCS | Mod: S$GLB,,, | Performed by: INTERNAL MEDICINE

## 2021-05-03 PROCEDURE — 1159F MED LIST DOCD IN RCRD: CPT | Mod: S$GLB,,, | Performed by: INTERNAL MEDICINE

## 2021-05-03 PROCEDURE — 99214 PR OFFICE/OUTPT VISIT, EST, LEVL IV, 30-39 MIN: ICD-10-PCS | Mod: S$GLB,,, | Performed by: INTERNAL MEDICINE

## 2021-05-03 PROCEDURE — 99999 PR PBB SHADOW E&M-EST. PATIENT-LVL I: CPT | Mod: PBBFAC,,, | Performed by: OPHTHALMOLOGY

## 2021-05-03 PROCEDURE — 1159F PR MEDICATION LIST DOCUMENTED IN MEDICAL RECORD: ICD-10-PCS | Mod: S$GLB,,, | Performed by: INTERNAL MEDICINE

## 2021-05-03 PROCEDURE — 3077F PR MOST RECENT SYSTOLIC BLOOD PRESSURE >= 140 MM HG: ICD-10-PCS | Mod: CPTII,S$GLB,, | Performed by: INTERNAL MEDICINE

## 2021-05-03 PROCEDURE — 99999 PR PBB SHADOW E&M-EST. PATIENT-LVL I: ICD-10-PCS | Mod: PBBFAC,,, | Performed by: OPHTHALMOLOGY

## 2021-05-03 PROCEDURE — 3079F DIAST BP 80-89 MM HG: CPT | Mod: CPTII,S$GLB,, | Performed by: INTERNAL MEDICINE

## 2021-05-03 PROCEDURE — 1101F PT FALLS ASSESS-DOCD LE1/YR: CPT | Mod: CPTII,S$GLB,, | Performed by: INTERNAL MEDICINE

## 2021-05-03 PROCEDURE — 99999 PR PBB SHADOW E&M-EST. PATIENT-LVL IV: ICD-10-PCS | Mod: PBBFAC,,, | Performed by: INTERNAL MEDICINE

## 2021-05-03 PROCEDURE — 1126F AMNT PAIN NOTED NONE PRSNT: CPT | Mod: S$GLB,,, | Performed by: INTERNAL MEDICINE

## 2021-05-03 PROCEDURE — 1126F PR PAIN SEVERITY QUANTIFIED, NO PAIN PRESENT: ICD-10-PCS | Mod: S$GLB,,, | Performed by: INTERNAL MEDICINE

## 2021-05-03 PROCEDURE — 1101F PR PT FALLS ASSESS DOC 0-1 FALLS W/OUT INJ PAST YR: ICD-10-PCS | Mod: CPTII,S$GLB,, | Performed by: INTERNAL MEDICINE

## 2021-05-03 PROCEDURE — 3288F FALL RISK ASSESSMENT DOCD: CPT | Mod: CPTII,S$GLB,, | Performed by: INTERNAL MEDICINE

## 2021-05-03 PROCEDURE — 1157F ADVNC CARE PLAN IN RCRD: CPT | Mod: S$GLB,,, | Performed by: INTERNAL MEDICINE

## 2021-05-03 PROCEDURE — 1157F PR ADVANCE CARE PLAN OR EQUIV PRESENT IN MEDICAL RECORD: ICD-10-PCS | Mod: S$GLB,,, | Performed by: OPHTHALMOLOGY

## 2021-05-03 PROCEDURE — 3288F PR FALLS RISK ASSESSMENT DOCUMENTED: ICD-10-PCS | Mod: CPTII,S$GLB,, | Performed by: INTERNAL MEDICINE

## 2021-05-03 PROCEDURE — 99999 PR PBB SHADOW E&M-EST. PATIENT-LVL IV: CPT | Mod: PBBFAC,,, | Performed by: INTERNAL MEDICINE

## 2021-05-03 PROCEDURE — 92012 PR EYE EXAM, EST PATIENT,INTERMED: ICD-10-PCS | Mod: S$GLB,,, | Performed by: OPHTHALMOLOGY

## 2021-05-03 PROCEDURE — 3079F PR MOST RECENT DIASTOLIC BLOOD PRESSURE 80-89 MM HG: ICD-10-PCS | Mod: CPTII,S$GLB,, | Performed by: INTERNAL MEDICINE

## 2021-05-03 PROCEDURE — 92012 INTRM OPH EXAM EST PATIENT: CPT | Mod: S$GLB,,, | Performed by: OPHTHALMOLOGY

## 2021-05-14 ENCOUNTER — INFUSION (OUTPATIENT)
Dept: INFUSION THERAPY | Facility: HOSPITAL | Age: 76
End: 2021-05-14
Attending: INTERNAL MEDICINE
Payer: MEDICARE

## 2021-05-14 VITALS
RESPIRATION RATE: 16 BRPM | DIASTOLIC BLOOD PRESSURE: 70 MMHG | OXYGEN SATURATION: 96 % | HEART RATE: 74 BPM | TEMPERATURE: 98 F | SYSTOLIC BLOOD PRESSURE: 121 MMHG

## 2021-05-14 DIAGNOSIS — D50.9 IRON DEFICIENCY ANEMIA, UNSPECIFIED IRON DEFICIENCY ANEMIA TYPE: ICD-10-CM

## 2021-05-14 DIAGNOSIS — N18.9 ANEMIA IN CHRONIC KIDNEY DISEASE, UNSPECIFIED CKD STAGE: Primary | ICD-10-CM

## 2021-05-14 DIAGNOSIS — Z53.1 REFUSAL OF BLOOD TRANSFUSIONS AS PATIENT IS JEHOVAH'S WITNESS: ICD-10-CM

## 2021-05-14 DIAGNOSIS — D63.1 ANEMIA IN CHRONIC KIDNEY DISEASE, UNSPECIFIED CKD STAGE: Primary | ICD-10-CM

## 2021-05-14 PROCEDURE — 96372 THER/PROPH/DIAG INJ SC/IM: CPT

## 2021-05-14 PROCEDURE — 63600175 PHARM REV CODE 636 W HCPCS: Performed by: INTERNAL MEDICINE

## 2021-05-14 RX ADMIN — EPOETIN ALFA-EPBX 20000 UNITS: 20000 INJECTION, SOLUTION INTRAVENOUS; SUBCUTANEOUS at 11:05

## 2021-05-17 ENCOUNTER — PATIENT MESSAGE (OUTPATIENT)
Dept: RHEUMATOLOGY | Facility: CLINIC | Age: 76
End: 2021-05-17

## 2021-05-17 DIAGNOSIS — M79.10 MYALGIA: ICD-10-CM

## 2021-05-17 RX ORDER — TIZANIDINE 2 MG/1
TABLET ORAL
Qty: 180 TABLET | Refills: 0 | Status: SHIPPED | OUTPATIENT
Start: 2021-05-17 | End: 2021-06-23 | Stop reason: SDUPTHER

## 2021-05-19 DIAGNOSIS — E11.69 COMBINED HYPERLIPIDEMIA ASSOCIATED WITH TYPE 2 DIABETES MELLITUS: Chronic | ICD-10-CM

## 2021-05-19 DIAGNOSIS — M51.36 DEGENERATIVE DISC DISEASE, LUMBAR: ICD-10-CM

## 2021-05-19 DIAGNOSIS — E78.2 COMBINED HYPERLIPIDEMIA ASSOCIATED WITH TYPE 2 DIABETES MELLITUS: Chronic | ICD-10-CM

## 2021-05-20 RX ORDER — HYDROCODONE BITARTRATE AND ACETAMINOPHEN 5; 325 MG/1; MG/1
1 TABLET ORAL EVERY 6 HOURS PRN
Qty: 90 TABLET | Refills: 0 | Status: SHIPPED | OUTPATIENT
Start: 2021-05-20 | End: 2021-06-28 | Stop reason: SDUPTHER

## 2021-05-20 RX ORDER — ATORVASTATIN CALCIUM 20 MG/1
20 TABLET, FILM COATED ORAL DAILY
Qty: 90 TABLET | Refills: 3 | Status: SHIPPED | OUTPATIENT
Start: 2021-05-20 | End: 2022-06-07

## 2021-05-28 ENCOUNTER — INFUSION (OUTPATIENT)
Dept: INFUSION THERAPY | Facility: HOSPITAL | Age: 76
End: 2021-05-28
Attending: INTERNAL MEDICINE
Payer: MEDICARE

## 2021-05-28 ENCOUNTER — LAB VISIT (OUTPATIENT)
Dept: LAB | Facility: HOSPITAL | Age: 76
End: 2021-05-28
Attending: INTERNAL MEDICINE
Payer: MEDICARE

## 2021-05-28 VITALS
TEMPERATURE: 98 F | SYSTOLIC BLOOD PRESSURE: 122 MMHG | OXYGEN SATURATION: 97 % | HEART RATE: 82 BPM | RESPIRATION RATE: 17 BRPM | DIASTOLIC BLOOD PRESSURE: 61 MMHG

## 2021-05-28 DIAGNOSIS — D64.9 ANEMIA: Primary | ICD-10-CM

## 2021-05-28 LAB
ERYTHROCYTE [DISTWIDTH] IN BLOOD BY AUTOMATED COUNT: 13.5 % (ref 11.5–14.5)
HCT VFR BLD AUTO: 34.1 % (ref 37–48.5)
HGB BLD-MCNC: 11.1 G/DL (ref 12–16)
IMM GRANULOCYTES # BLD AUTO: 0.03 K/UL (ref 0–0.04)
MCH RBC QN AUTO: 32.5 PG (ref 27–31)
MCHC RBC AUTO-ENTMCNC: 32.6 G/DL (ref 32–36)
MCV RBC AUTO: 100 FL (ref 82–98)
NEUTROPHILS # BLD AUTO: 2.9 K/UL (ref 1.8–7.7)
PLATELET # BLD AUTO: 259 K/UL (ref 150–450)
PMV BLD AUTO: 8.7 FL (ref 9.2–12.9)
RBC # BLD AUTO: 3.42 M/UL (ref 4–5.4)
WBC # BLD AUTO: 5.61 K/UL (ref 3.9–12.7)

## 2021-05-28 PROCEDURE — 85027 COMPLETE CBC AUTOMATED: CPT | Performed by: INTERNAL MEDICINE

## 2021-05-28 PROCEDURE — 36415 COLL VENOUS BLD VENIPUNCTURE: CPT | Performed by: INTERNAL MEDICINE

## 2021-06-08 DIAGNOSIS — I10 ESSENTIAL HYPERTENSION: Chronic | ICD-10-CM

## 2021-06-08 RX ORDER — CARVEDILOL 25 MG/1
25 TABLET ORAL 2 TIMES DAILY
Qty: 180 TABLET | Refills: 1 | Status: ON HOLD | OUTPATIENT
Start: 2021-06-08 | End: 2021-07-10 | Stop reason: SDUPTHER

## 2021-06-11 ENCOUNTER — INFUSION (OUTPATIENT)
Dept: INFUSION THERAPY | Facility: HOSPITAL | Age: 76
End: 2021-06-11
Attending: INTERNAL MEDICINE
Payer: MEDICARE

## 2021-06-11 VITALS
OXYGEN SATURATION: 98 % | RESPIRATION RATE: 17 BRPM | SYSTOLIC BLOOD PRESSURE: 145 MMHG | TEMPERATURE: 98 F | DIASTOLIC BLOOD PRESSURE: 68 MMHG | HEART RATE: 85 BPM

## 2021-06-11 DIAGNOSIS — D50.9 IRON DEFICIENCY ANEMIA, UNSPECIFIED IRON DEFICIENCY ANEMIA TYPE: ICD-10-CM

## 2021-06-11 DIAGNOSIS — Z53.1 REFUSAL OF BLOOD TRANSFUSIONS AS PATIENT IS JEHOVAH'S WITNESS: ICD-10-CM

## 2021-06-11 DIAGNOSIS — N18.9 ANEMIA IN CHRONIC KIDNEY DISEASE, UNSPECIFIED CKD STAGE: Primary | ICD-10-CM

## 2021-06-11 DIAGNOSIS — D63.1 ANEMIA IN CHRONIC KIDNEY DISEASE, UNSPECIFIED CKD STAGE: Primary | ICD-10-CM

## 2021-06-11 PROCEDURE — 63600175 PHARM REV CODE 636 W HCPCS: Performed by: INTERNAL MEDICINE

## 2021-06-11 PROCEDURE — 96372 THER/PROPH/DIAG INJ SC/IM: CPT

## 2021-06-11 RX ADMIN — EPOETIN ALFA-EPBX 20000 UNITS: 20000 INJECTION, SOLUTION INTRAVENOUS; SUBCUTANEOUS at 12:06

## 2021-06-25 ENCOUNTER — INFUSION (OUTPATIENT)
Dept: INFUSION THERAPY | Facility: HOSPITAL | Age: 76
End: 2021-06-25
Attending: INTERNAL MEDICINE
Payer: MEDICARE

## 2021-06-25 VITALS
DIASTOLIC BLOOD PRESSURE: 71 MMHG | SYSTOLIC BLOOD PRESSURE: 156 MMHG | OXYGEN SATURATION: 96 % | RESPIRATION RATE: 16 BRPM | HEART RATE: 76 BPM | TEMPERATURE: 98 F

## 2021-06-25 DIAGNOSIS — D50.9 IRON DEFICIENCY ANEMIA, UNSPECIFIED IRON DEFICIENCY ANEMIA TYPE: ICD-10-CM

## 2021-06-25 DIAGNOSIS — Z53.1 REFUSAL OF BLOOD TRANSFUSIONS AS PATIENT IS JEHOVAH'S WITNESS: ICD-10-CM

## 2021-06-25 DIAGNOSIS — N18.9 ANEMIA IN CHRONIC KIDNEY DISEASE, UNSPECIFIED CKD STAGE: ICD-10-CM

## 2021-06-25 DIAGNOSIS — D63.1 ANEMIA IN CHRONIC KIDNEY DISEASE, UNSPECIFIED CKD STAGE: ICD-10-CM

## 2021-06-25 DIAGNOSIS — D64.9 ANEMIA: Primary | ICD-10-CM

## 2021-06-25 PROCEDURE — 96372 THER/PROPH/DIAG INJ SC/IM: CPT

## 2021-06-25 PROCEDURE — 63600175 PHARM REV CODE 636 W HCPCS: Mod: TB | Performed by: INTERNAL MEDICINE

## 2021-06-25 RX ADMIN — EPOETIN ALFA-EPBX 20000 UNITS: 20000 INJECTION, SOLUTION INTRAVENOUS; SUBCUTANEOUS at 12:06

## 2021-06-28 ENCOUNTER — PATIENT MESSAGE (OUTPATIENT)
Dept: FAMILY MEDICINE | Facility: CLINIC | Age: 76
End: 2021-06-28

## 2021-06-28 DIAGNOSIS — M51.36 DEGENERATIVE DISC DISEASE, LUMBAR: ICD-10-CM

## 2021-06-28 RX ORDER — HYDROCODONE BITARTRATE AND ACETAMINOPHEN 5; 325 MG/1; MG/1
1 TABLET ORAL EVERY 6 HOURS PRN
Qty: 90 TABLET | Refills: 0 | Status: ON HOLD | OUTPATIENT
Start: 2021-06-28 | End: 2021-07-10 | Stop reason: HOSPADM

## 2021-07-01 ENCOUNTER — TELEPHONE (OUTPATIENT)
Dept: RHEUMATOLOGY | Facility: CLINIC | Age: 76
End: 2021-07-01

## 2021-07-01 DIAGNOSIS — D86.9 SARCOIDOSIS: Primary | ICD-10-CM

## 2021-07-01 DIAGNOSIS — G72.9 MYOPATHY: ICD-10-CM

## 2021-07-01 DIAGNOSIS — D86.86 SARCOID ARTHROPATHY: ICD-10-CM

## 2021-07-01 RX ORDER — PREDNISONE 20 MG/1
40 TABLET ORAL DAILY
Qty: 6 TABLET | Refills: 0 | Status: SHIPPED | OUTPATIENT
Start: 2021-07-01 | End: 2021-07-04

## 2021-07-07 ENCOUNTER — HOSPITAL ENCOUNTER (INPATIENT)
Facility: HOSPITAL | Age: 76
LOS: 3 days | Discharge: HOME HEALTH | DRG: 100 | End: 2021-07-12
Attending: EMERGENCY MEDICINE | Admitting: HOSPITALIST
Payer: MEDICARE

## 2021-07-07 DIAGNOSIS — S13.100A SUBLUXATION OF CERVICAL VERTEBRA, INITIAL ENCOUNTER: ICD-10-CM

## 2021-07-07 DIAGNOSIS — R55 SYNCOPE: ICD-10-CM

## 2021-07-07 DIAGNOSIS — S22.041A: ICD-10-CM

## 2021-07-07 DIAGNOSIS — I10 ESSENTIAL HYPERTENSION: Chronic | ICD-10-CM

## 2021-07-07 DIAGNOSIS — M54.42 CHRONIC BILATERAL LOW BACK PAIN WITH LEFT-SIDED SCIATICA: ICD-10-CM

## 2021-07-07 DIAGNOSIS — R56.9 SEIZURE: ICD-10-CM

## 2021-07-07 DIAGNOSIS — R07.9 CHEST PAIN: ICD-10-CM

## 2021-07-07 DIAGNOSIS — M48.02 STENOSIS OF CERVICAL SPINE: Primary | ICD-10-CM

## 2021-07-07 DIAGNOSIS — G89.29 CHRONIC BILATERAL LOW BACK PAIN WITH LEFT-SIDED SCIATICA: ICD-10-CM

## 2021-07-07 DIAGNOSIS — I26.99 PE (PULMONARY THROMBOEMBOLISM): ICD-10-CM

## 2021-07-07 DIAGNOSIS — R56.9 SEIZURE-LIKE ACTIVITY: ICD-10-CM

## 2021-07-07 DIAGNOSIS — I82.409 DVT (DEEP VENOUS THROMBOSIS): ICD-10-CM

## 2021-07-07 LAB
ALBUMIN SERPL BCP-MCNC: 3.9 G/DL (ref 3.5–5.2)
ALP SERPL-CCNC: 52 U/L (ref 55–135)
ALT SERPL W/O P-5'-P-CCNC: 7 U/L (ref 10–44)
ANION GAP SERPL CALC-SCNC: 11 MMOL/L (ref 8–16)
AST SERPL-CCNC: 21 U/L (ref 10–40)
BASOPHILS # BLD AUTO: 0.01 K/UL (ref 0–0.2)
BASOPHILS NFR BLD: 0.1 % (ref 0–1.9)
BILIRUB SERPL-MCNC: 0.8 MG/DL (ref 0.1–1)
BNP SERPL-MCNC: 94 PG/ML (ref 0–99)
BUN SERPL-MCNC: 14 MG/DL (ref 8–23)
CALCIUM SERPL-MCNC: 9 MG/DL (ref 8.7–10.5)
CHLORIDE SERPL-SCNC: 100 MMOL/L (ref 95–110)
CO2 SERPL-SCNC: 25 MMOL/L (ref 23–29)
CREAT SERPL-MCNC: 1.2 MG/DL (ref 0.5–1.4)
DIFFERENTIAL METHOD: ABNORMAL
EOSINOPHIL # BLD AUTO: 0.1 K/UL (ref 0–0.5)
EOSINOPHIL NFR BLD: 1 % (ref 0–8)
ERYTHROCYTE [DISTWIDTH] IN BLOOD BY AUTOMATED COUNT: 14.2 % (ref 11.5–14.5)
EST. GFR  (AFRICAN AMERICAN): 51 ML/MIN/1.73 M^2
EST. GFR  (NON AFRICAN AMERICAN): 44 ML/MIN/1.73 M^2
GLUCOSE SERPL-MCNC: 123 MG/DL (ref 70–110)
HCT VFR BLD AUTO: 34.9 % (ref 37–48.5)
HGB BLD-MCNC: 11.7 G/DL (ref 12–16)
IMM GRANULOCYTES # BLD AUTO: 0.16 K/UL (ref 0–0.04)
IMM GRANULOCYTES NFR BLD AUTO: 1.8 % (ref 0–0.5)
LYMPHOCYTES # BLD AUTO: 1.9 K/UL (ref 1–4.8)
LYMPHOCYTES NFR BLD: 21.5 % (ref 18–48)
MCH RBC QN AUTO: 33.2 PG (ref 27–31)
MCHC RBC AUTO-ENTMCNC: 33.5 G/DL (ref 32–36)
MCV RBC AUTO: 99 FL (ref 82–98)
MONOCYTES # BLD AUTO: 0.6 K/UL (ref 0.3–1)
MONOCYTES NFR BLD: 7.1 % (ref 4–15)
NEUTROPHILS # BLD AUTO: 6 K/UL (ref 1.8–7.7)
NEUTROPHILS NFR BLD: 68.5 % (ref 38–73)
NRBC BLD-RTO: 0 /100 WBC
PLATELET # BLD AUTO: 285 K/UL (ref 150–450)
PMV BLD AUTO: 9.2 FL (ref 9.2–12.9)
POCT GLUCOSE: 122 MG/DL (ref 70–110)
POTASSIUM SERPL-SCNC: 3.9 MMOL/L (ref 3.5–5.1)
PROT SERPL-MCNC: 7.5 G/DL (ref 6–8.4)
RBC # BLD AUTO: 3.52 M/UL (ref 4–5.4)
SODIUM SERPL-SCNC: 136 MMOL/L (ref 136–145)
TROPONIN I SERPL DL<=0.01 NG/ML-MCNC: <0.006 NG/ML (ref 0–0.03)
TROPONIN I SERPL DL<=0.01 NG/ML-MCNC: <0.006 NG/ML (ref 0–0.03)
WBC # BLD AUTO: 8.76 K/UL (ref 3.9–12.7)

## 2021-07-07 PROCEDURE — 93010 EKG 12-LEAD: ICD-10-PCS | Mod: ,,, | Performed by: INTERNAL MEDICINE

## 2021-07-07 PROCEDURE — 25000003 PHARM REV CODE 250: Performed by: EMERGENCY MEDICINE

## 2021-07-07 PROCEDURE — 84484 ASSAY OF TROPONIN QUANT: CPT | Performed by: EMERGENCY MEDICINE

## 2021-07-07 PROCEDURE — 85025 COMPLETE CBC W/AUTO DIFF WBC: CPT | Performed by: EMERGENCY MEDICINE

## 2021-07-07 PROCEDURE — 80053 COMPREHEN METABOLIC PANEL: CPT | Performed by: EMERGENCY MEDICINE

## 2021-07-07 PROCEDURE — 83880 ASSAY OF NATRIURETIC PEPTIDE: CPT | Performed by: EMERGENCY MEDICINE

## 2021-07-07 PROCEDURE — 93010 ELECTROCARDIOGRAM REPORT: CPT | Mod: ,,, | Performed by: INTERNAL MEDICINE

## 2021-07-07 PROCEDURE — 93005 ELECTROCARDIOGRAM TRACING: CPT

## 2021-07-07 RX ORDER — CLONIDINE HYDROCHLORIDE 0.1 MG/1
0.3 TABLET ORAL
Status: COMPLETED | OUTPATIENT
Start: 2021-07-07 | End: 2021-07-07

## 2021-07-07 RX ORDER — CARVEDILOL 12.5 MG/1
25 TABLET ORAL
Status: COMPLETED | OUTPATIENT
Start: 2021-07-07 | End: 2021-07-07

## 2021-07-07 RX ORDER — HYDROCODONE BITARTRATE AND ACETAMINOPHEN 5; 325 MG/1; MG/1
1 TABLET ORAL
Status: COMPLETED | OUTPATIENT
Start: 2021-07-07 | End: 2021-07-07

## 2021-07-07 RX ADMIN — HYDROCODONE BITARTRATE AND ACETAMINOPHEN 1 TABLET: 5; 325 TABLET ORAL at 02:07

## 2021-07-07 RX ADMIN — CLONIDINE HYDROCHLORIDE 0.3 MG: 0.1 TABLET ORAL at 10:07

## 2021-07-07 RX ADMIN — CLONIDINE HYDROCHLORIDE 0.3 MG: 0.1 TABLET ORAL at 03:07

## 2021-07-07 RX ADMIN — HYDROCODONE BITARTRATE AND ACETAMINOPHEN 1 TABLET: 5; 325 TABLET ORAL at 06:07

## 2021-07-07 RX ADMIN — CARVEDILOL 25 MG: 12.5 TABLET, FILM COATED ORAL at 10:07

## 2021-07-08 PROBLEM — R56.9 SEIZURE: Status: ACTIVE | Noted: 2021-03-12

## 2021-07-08 PROBLEM — R56.9 SEIZURE-LIKE ACTIVITY: Status: RESOLVED | Noted: 2021-07-08 | Resolved: 2021-07-08

## 2021-07-08 PROBLEM — S22.049A: Status: ACTIVE | Noted: 2021-07-08

## 2021-07-08 PROBLEM — S13.100A CERVICAL SUBLUXATION: Status: ACTIVE | Noted: 2021-07-08

## 2021-07-08 PROBLEM — R56.9 SEIZURE-LIKE ACTIVITY: Status: ACTIVE | Noted: 2021-07-08

## 2021-07-08 LAB
ALBUMIN SERPL BCP-MCNC: 3.6 G/DL (ref 3.5–5.2)
ALP SERPL-CCNC: 49 U/L (ref 55–135)
ALT SERPL W/O P-5'-P-CCNC: 8 U/L (ref 10–44)
ANION GAP SERPL CALC-SCNC: 12 MMOL/L (ref 8–16)
AST SERPL-CCNC: 13 U/L (ref 10–40)
BASOPHILS # BLD AUTO: 0.01 K/UL (ref 0–0.2)
BASOPHILS NFR BLD: 0.2 % (ref 0–1.9)
BILIRUB SERPL-MCNC: 0.9 MG/DL (ref 0.1–1)
BUN SERPL-MCNC: 16 MG/DL (ref 8–23)
CALCIUM SERPL-MCNC: 9.4 MG/DL (ref 8.7–10.5)
CHLORIDE SERPL-SCNC: 101 MMOL/L (ref 95–110)
CO2 SERPL-SCNC: 25 MMOL/L (ref 23–29)
CREAT SERPL-MCNC: 1 MG/DL (ref 0.5–1.4)
DIFFERENTIAL METHOD: ABNORMAL
EOSINOPHIL # BLD AUTO: 0.1 K/UL (ref 0–0.5)
EOSINOPHIL NFR BLD: 1.7 % (ref 0–8)
ERYTHROCYTE [DISTWIDTH] IN BLOOD BY AUTOMATED COUNT: 14.1 % (ref 11.5–14.5)
EST. GFR  (AFRICAN AMERICAN): >60 ML/MIN/1.73 M^2
EST. GFR  (NON AFRICAN AMERICAN): 55.2 ML/MIN/1.73 M^2
ESTIMATED AVG GLUCOSE: 114 MG/DL (ref 68–131)
GLUCOSE SERPL-MCNC: 76 MG/DL (ref 70–110)
HBA1C MFR BLD: 5.6 % (ref 4–5.6)
HCT VFR BLD AUTO: 36.1 % (ref 37–48.5)
HGB BLD-MCNC: 11.6 G/DL (ref 12–16)
IMM GRANULOCYTES # BLD AUTO: 0.05 K/UL (ref 0–0.04)
IMM GRANULOCYTES NFR BLD AUTO: 0.8 % (ref 0–0.5)
LYMPHOCYTES # BLD AUTO: 1.4 K/UL (ref 1–4.8)
LYMPHOCYTES NFR BLD: 23.2 % (ref 18–48)
MAGNESIUM SERPL-MCNC: 1.4 MG/DL (ref 1.6–2.6)
MCH RBC QN AUTO: 32.2 PG (ref 27–31)
MCHC RBC AUTO-ENTMCNC: 32.1 G/DL (ref 32–36)
MCV RBC AUTO: 100 FL (ref 82–98)
MONOCYTES # BLD AUTO: 0.4 K/UL (ref 0.3–1)
MONOCYTES NFR BLD: 7.2 % (ref 4–15)
NEUTROPHILS # BLD AUTO: 4 K/UL (ref 1.8–7.7)
NEUTROPHILS NFR BLD: 66.9 % (ref 38–73)
NRBC BLD-RTO: 0 /100 WBC
PLATELET # BLD AUTO: 298 K/UL (ref 150–450)
PMV BLD AUTO: 9.4 FL (ref 9.2–12.9)
POCT GLUCOSE: 106 MG/DL (ref 70–110)
POCT GLUCOSE: 76 MG/DL (ref 70–110)
POCT GLUCOSE: 83 MG/DL (ref 70–110)
POTASSIUM SERPL-SCNC: 3.3 MMOL/L (ref 3.5–5.1)
PROT SERPL-MCNC: 6.5 G/DL (ref 6–8.4)
RBC # BLD AUTO: 3.6 M/UL (ref 4–5.4)
SODIUM SERPL-SCNC: 138 MMOL/L (ref 136–145)
WBC # BLD AUTO: 5.98 K/UL (ref 3.9–12.7)

## 2021-07-08 PROCEDURE — G0378 HOSPITAL OBSERVATION PER HR: HCPCS

## 2021-07-08 PROCEDURE — 99223 1ST HOSP IP/OBS HIGH 75: CPT | Mod: ,,, | Performed by: NEUROLOGICAL SURGERY

## 2021-07-08 PROCEDURE — 95700 EEG CONT REC W/VID EEG TECH: CPT

## 2021-07-08 PROCEDURE — 25000003 PHARM REV CODE 250: Performed by: STUDENT IN AN ORGANIZED HEALTH CARE EDUCATION/TRAINING PROGRAM

## 2021-07-08 PROCEDURE — 63600175 PHARM REV CODE 636 W HCPCS: Performed by: STUDENT IN AN ORGANIZED HEALTH CARE EDUCATION/TRAINING PROGRAM

## 2021-07-08 PROCEDURE — 99223 PR INITIAL HOSPITAL CARE,LEVL III: ICD-10-PCS | Mod: ,,, | Performed by: NEUROLOGICAL SURGERY

## 2021-07-08 PROCEDURE — 36415 COLL VENOUS BLD VENIPUNCTURE: CPT | Performed by: STUDENT IN AN ORGANIZED HEALTH CARE EDUCATION/TRAINING PROGRAM

## 2021-07-08 PROCEDURE — 80053 COMPREHEN METABOLIC PANEL: CPT | Performed by: HOSPITALIST

## 2021-07-08 PROCEDURE — 99214 OFFICE O/P EST MOD 30 MIN: CPT | Mod: GC,,, | Performed by: PSYCHIATRY & NEUROLOGY

## 2021-07-08 PROCEDURE — 85025 COMPLETE CBC W/AUTO DIFF WBC: CPT | Performed by: HOSPITALIST

## 2021-07-08 PROCEDURE — 95720 EEG PHY/QHP EA INCR W/VEEG: CPT | Mod: ,,, | Performed by: PSYCHIATRY & NEUROLOGY

## 2021-07-08 PROCEDURE — 99220 PR INITIAL OBSERVATION CARE,LEVL III: ICD-10-PCS | Mod: GC,,, | Performed by: HOSPITALIST

## 2021-07-08 PROCEDURE — 99292 CRITICAL CARE ADDL 30 MIN: CPT

## 2021-07-08 PROCEDURE — 99291 CRITICAL CARE FIRST HOUR: CPT | Mod: 25

## 2021-07-08 PROCEDURE — 82962 GLUCOSE BLOOD TEST: CPT

## 2021-07-08 PROCEDURE — 99220 PR INITIAL OBSERVATION CARE,LEVL III: CPT | Mod: GC,,, | Performed by: HOSPITALIST

## 2021-07-08 PROCEDURE — 83036 HEMOGLOBIN GLYCOSYLATED A1C: CPT | Performed by: STUDENT IN AN ORGANIZED HEALTH CARE EDUCATION/TRAINING PROGRAM

## 2021-07-08 PROCEDURE — 83735 ASSAY OF MAGNESIUM: CPT | Performed by: STUDENT IN AN ORGANIZED HEALTH CARE EDUCATION/TRAINING PROGRAM

## 2021-07-08 PROCEDURE — 95714 VEEG EA 12-26 HR UNMNTR: CPT

## 2021-07-08 PROCEDURE — 63600175 PHARM REV CODE 636 W HCPCS: Performed by: HOSPITALIST

## 2021-07-08 PROCEDURE — 96374 THER/PROPH/DIAG INJ IV PUSH: CPT

## 2021-07-08 PROCEDURE — 96372 THER/PROPH/DIAG INJ SC/IM: CPT | Mod: 59

## 2021-07-08 PROCEDURE — 25000003 PHARM REV CODE 250: Performed by: HOSPITALIST

## 2021-07-08 PROCEDURE — 99214 PR OFFICE/OUTPT VISIT, EST, LEVL IV, 30-39 MIN: ICD-10-PCS | Mod: GC,,, | Performed by: PSYCHIATRY & NEUROLOGY

## 2021-07-08 PROCEDURE — 95720 PR EEG, W/VIDEO, CONT RECORD, I&R, >12<26 HRS: ICD-10-PCS | Mod: ,,, | Performed by: PSYCHIATRY & NEUROLOGY

## 2021-07-08 RX ORDER — TALC
6 POWDER (GRAM) TOPICAL NIGHTLY PRN
Status: CANCELLED | OUTPATIENT
Start: 2021-07-08

## 2021-07-08 RX ORDER — MAGNESIUM SULFATE HEPTAHYDRATE 40 MG/ML
2 INJECTION, SOLUTION INTRAVENOUS ONCE
Status: COMPLETED | OUTPATIENT
Start: 2021-07-08 | End: 2021-07-08

## 2021-07-08 RX ORDER — CARVEDILOL 25 MG/1
25 TABLET ORAL 2 TIMES DAILY
Status: DISCONTINUED | OUTPATIENT
Start: 2021-07-08 | End: 2021-07-12 | Stop reason: HOSPADM

## 2021-07-08 RX ORDER — LORAZEPAM 2 MG/ML
1 INJECTION INTRAMUSCULAR
Status: DISCONTINUED | OUTPATIENT
Start: 2021-07-08 | End: 2021-07-12 | Stop reason: HOSPADM

## 2021-07-08 RX ORDER — IBUPROFEN 200 MG
24 TABLET ORAL
Status: DISCONTINUED | OUTPATIENT
Start: 2021-07-08 | End: 2021-07-12 | Stop reason: HOSPADM

## 2021-07-08 RX ORDER — SODIUM CHLORIDE 0.9 % (FLUSH) 0.9 %
10 SYRINGE (ML) INJECTION
Status: DISCONTINUED | OUTPATIENT
Start: 2021-07-08 | End: 2021-07-12 | Stop reason: HOSPADM

## 2021-07-08 RX ORDER — INSULIN ASPART 100 [IU]/ML
0-5 INJECTION, SOLUTION INTRAVENOUS; SUBCUTANEOUS
Status: DISCONTINUED | OUTPATIENT
Start: 2021-07-08 | End: 2021-07-12 | Stop reason: HOSPADM

## 2021-07-08 RX ORDER — GLUCAGON 1 MG
1 KIT INJECTION
Status: DISCONTINUED | OUTPATIENT
Start: 2021-07-08 | End: 2021-07-12 | Stop reason: HOSPADM

## 2021-07-08 RX ORDER — HEPARIN SODIUM 5000 [USP'U]/ML
5000 INJECTION, SOLUTION INTRAVENOUS; SUBCUTANEOUS EVERY 8 HOURS
Status: DISCONTINUED | OUTPATIENT
Start: 2021-07-08 | End: 2021-07-10

## 2021-07-08 RX ORDER — SODIUM CHLORIDE 0.9 % (FLUSH) 0.9 %
10 SYRINGE (ML) INJECTION
Status: CANCELLED | OUTPATIENT
Start: 2021-07-08

## 2021-07-08 RX ORDER — LEVOTHYROXINE SODIUM 50 UG/1
50 TABLET ORAL
Status: DISCONTINUED | OUTPATIENT
Start: 2021-07-08 | End: 2021-07-12 | Stop reason: HOSPADM

## 2021-07-08 RX ORDER — GABAPENTIN 400 MG/1
400 CAPSULE ORAL 3 TIMES DAILY
Status: DISCONTINUED | OUTPATIENT
Start: 2021-07-08 | End: 2021-07-12 | Stop reason: HOSPADM

## 2021-07-08 RX ORDER — HYDRALAZINE HYDROCHLORIDE 25 MG/1
25 TABLET, FILM COATED ORAL EVERY 8 HOURS
Status: DISCONTINUED | OUTPATIENT
Start: 2021-07-08 | End: 2021-07-11

## 2021-07-08 RX ORDER — HYDROCODONE BITARTRATE AND ACETAMINOPHEN 5; 325 MG/1; MG/1
1 TABLET ORAL EVERY 6 HOURS PRN
Status: DISCONTINUED | OUTPATIENT
Start: 2021-07-08 | End: 2021-07-12 | Stop reason: HOSPADM

## 2021-07-08 RX ORDER — FOLIC ACID 1 MG/1
1000 TABLET ORAL DAILY
Status: DISCONTINUED | OUTPATIENT
Start: 2021-07-08 | End: 2021-07-12 | Stop reason: HOSPADM

## 2021-07-08 RX ORDER — PREDNISONE 5 MG/1
5 TABLET ORAL DAILY
Status: DISCONTINUED | OUTPATIENT
Start: 2021-07-08 | End: 2021-07-12 | Stop reason: HOSPADM

## 2021-07-08 RX ORDER — IBUPROFEN 200 MG
16 TABLET ORAL
Status: DISCONTINUED | OUTPATIENT
Start: 2021-07-08 | End: 2021-07-12 | Stop reason: HOSPADM

## 2021-07-08 RX ORDER — ATORVASTATIN CALCIUM 20 MG/1
20 TABLET, FILM COATED ORAL DAILY
Status: DISCONTINUED | OUTPATIENT
Start: 2021-07-08 | End: 2021-07-12 | Stop reason: HOSPADM

## 2021-07-08 RX ORDER — CALCIUM CARBONATE 200(500)MG
500 TABLET,CHEWABLE ORAL DAILY
Status: DISCONTINUED | OUTPATIENT
Start: 2021-07-08 | End: 2021-07-12 | Stop reason: HOSPADM

## 2021-07-08 RX ORDER — NIFEDIPINE 30 MG/1
30 TABLET, EXTENDED RELEASE ORAL DAILY
Status: DISCONTINUED | OUTPATIENT
Start: 2021-07-08 | End: 2021-07-11

## 2021-07-08 RX ORDER — TIZANIDINE 2 MG/1
2 TABLET ORAL EVERY 8 HOURS PRN
Status: DISCONTINUED | OUTPATIENT
Start: 2021-07-08 | End: 2021-07-12 | Stop reason: HOSPADM

## 2021-07-08 RX ORDER — FENOFIBRATE 160 MG/1
160 TABLET ORAL DAILY
Status: DISCONTINUED | OUTPATIENT
Start: 2021-07-08 | End: 2021-07-12 | Stop reason: HOSPADM

## 2021-07-08 RX ADMIN — CARVEDILOL 25 MG: 25 TABLET, FILM COATED ORAL at 08:07

## 2021-07-08 RX ADMIN — MAGNESIUM SULFATE HEPTAHYDRATE 2 G: 40 INJECTION, SOLUTION INTRAVENOUS at 06:07

## 2021-07-08 RX ADMIN — POTASSIUM BICARBONATE 25 MEQ: 978 TABLET, EFFERVESCENT ORAL at 08:07

## 2021-07-08 RX ADMIN — HYDRALAZINE HYDROCHLORIDE 25 MG: 25 TABLET, FILM COATED ORAL at 02:07

## 2021-07-08 RX ADMIN — HEPARIN SODIUM 5000 UNITS: 5000 INJECTION INTRAVENOUS; SUBCUTANEOUS at 02:07

## 2021-07-08 RX ADMIN — GABAPENTIN 400 MG: 400 CAPSULE ORAL at 02:07

## 2021-07-08 RX ADMIN — HEPARIN SODIUM 5000 UNITS: 5000 INJECTION INTRAVENOUS; SUBCUTANEOUS at 11:07

## 2021-07-08 RX ADMIN — POTASSIUM BICARBONATE 25 MEQ: 978 TABLET, EFFERVESCENT ORAL at 06:07

## 2021-07-08 RX ADMIN — LEVOTHYROXINE SODIUM 50 MCG: 50 TABLET ORAL at 05:07

## 2021-07-08 RX ADMIN — HEPARIN SODIUM 5000 UNITS: 5000 INJECTION INTRAVENOUS; SUBCUTANEOUS at 05:07

## 2021-07-08 RX ADMIN — GABAPENTIN 400 MG: 400 CAPSULE ORAL at 08:07

## 2021-07-08 RX ADMIN — HYDRALAZINE HYDROCHLORIDE 25 MG: 25 TABLET, FILM COATED ORAL at 11:07

## 2021-07-09 PROBLEM — M48.02 STENOSIS OF CERVICAL SPINE: Status: ACTIVE | Noted: 2021-07-09

## 2021-07-09 LAB
ALBUMIN SERPL BCP-MCNC: 3.6 G/DL (ref 3.5–5.2)
ALP SERPL-CCNC: 55 U/L (ref 55–135)
ALT SERPL W/O P-5'-P-CCNC: 7 U/L (ref 10–44)
ANION GAP SERPL CALC-SCNC: 15 MMOL/L (ref 8–16)
AST SERPL-CCNC: 11 U/L (ref 10–40)
BACTERIA #/AREA URNS AUTO: NORMAL /HPF
BASOPHILS # BLD AUTO: 0.02 K/UL (ref 0–0.2)
BASOPHILS NFR BLD: 0.3 % (ref 0–1.9)
BILIRUB SERPL-MCNC: 0.5 MG/DL (ref 0.1–1)
BILIRUB UR QL STRIP: NEGATIVE
BUN SERPL-MCNC: 20 MG/DL (ref 8–23)
CALCIUM SERPL-MCNC: 9.3 MG/DL (ref 8.7–10.5)
CHLORIDE SERPL-SCNC: 97 MMOL/L (ref 95–110)
CLARITY UR REFRACT.AUTO: ABNORMAL
CO2 SERPL-SCNC: 20 MMOL/L (ref 23–29)
COLOR UR AUTO: YELLOW
CREAT SERPL-MCNC: 1.1 MG/DL (ref 0.5–1.4)
D DIMER PPP IA.FEU-MCNC: 1.57 MG/L FEU
DIFFERENTIAL METHOD: ABNORMAL
EOSINOPHIL # BLD AUTO: 0.1 K/UL (ref 0–0.5)
EOSINOPHIL NFR BLD: 1.2 % (ref 0–8)
ERYTHROCYTE [DISTWIDTH] IN BLOOD BY AUTOMATED COUNT: 14.2 % (ref 11.5–14.5)
EST. GFR  (AFRICAN AMERICAN): 56.8 ML/MIN/1.73 M^2
EST. GFR  (NON AFRICAN AMERICAN): 49.2 ML/MIN/1.73 M^2
GLUCOSE SERPL-MCNC: 97 MG/DL (ref 70–110)
GLUCOSE UR QL STRIP: NEGATIVE
HCT VFR BLD AUTO: 36.9 % (ref 37–48.5)
HGB BLD-MCNC: 12.1 G/DL (ref 12–16)
HGB UR QL STRIP: NEGATIVE
IMM GRANULOCYTES # BLD AUTO: 0.05 K/UL (ref 0–0.04)
IMM GRANULOCYTES NFR BLD AUTO: 0.8 % (ref 0–0.5)
KETONES UR QL STRIP: ABNORMAL
LEUKOCYTE ESTERASE UR QL STRIP: ABNORMAL
LYMPHOCYTES # BLD AUTO: 1.5 K/UL (ref 1–4.8)
LYMPHOCYTES NFR BLD: 22.6 % (ref 18–48)
MAGNESIUM SERPL-MCNC: 2.1 MG/DL (ref 1.6–2.6)
MCH RBC QN AUTO: 32.6 PG (ref 27–31)
MCHC RBC AUTO-ENTMCNC: 32.8 G/DL (ref 32–36)
MCV RBC AUTO: 100 FL (ref 82–98)
MICROSCOPIC COMMENT: NORMAL
MONOCYTES # BLD AUTO: 0.6 K/UL (ref 0.3–1)
MONOCYTES NFR BLD: 9.1 % (ref 4–15)
NEUTROPHILS # BLD AUTO: 4.3 K/UL (ref 1.8–7.7)
NEUTROPHILS NFR BLD: 66 % (ref 38–73)
NITRITE UR QL STRIP: NEGATIVE
NRBC BLD-RTO: 0 /100 WBC
PH UR STRIP: 7 [PH] (ref 5–8)
PLATELET # BLD AUTO: 316 K/UL (ref 150–450)
PMV BLD AUTO: 9.4 FL (ref 9.2–12.9)
POCT GLUCOSE: 104 MG/DL (ref 70–110)
POCT GLUCOSE: 94 MG/DL (ref 70–110)
POTASSIUM SERPL-SCNC: 3.4 MMOL/L (ref 3.5–5.1)
PROT SERPL-MCNC: 6.7 G/DL (ref 6–8.4)
PROT UR QL STRIP: NEGATIVE
RBC # BLD AUTO: 3.71 M/UL (ref 4–5.4)
RBC #/AREA URNS AUTO: 1 /HPF (ref 0–4)
SODIUM SERPL-SCNC: 132 MMOL/L (ref 136–145)
SP GR UR STRIP: 1.01 (ref 1–1.03)
SQUAMOUS #/AREA URNS AUTO: 8 /HPF
URN SPEC COLLECT METH UR: ABNORMAL
WBC # BLD AUTO: 6.45 K/UL (ref 3.9–12.7)
WBC #/AREA URNS AUTO: 5 /HPF (ref 0–5)

## 2021-07-09 PROCEDURE — 83735 ASSAY OF MAGNESIUM: CPT | Performed by: STUDENT IN AN ORGANIZED HEALTH CARE EDUCATION/TRAINING PROGRAM

## 2021-07-09 PROCEDURE — 81001 URINALYSIS AUTO W/SCOPE: CPT | Performed by: STUDENT IN AN ORGANIZED HEALTH CARE EDUCATION/TRAINING PROGRAM

## 2021-07-09 PROCEDURE — 85025 COMPLETE CBC W/AUTO DIFF WBC: CPT | Performed by: HOSPITALIST

## 2021-07-09 PROCEDURE — 11000001 HC ACUTE MED/SURG PRIVATE ROOM

## 2021-07-09 PROCEDURE — 25000003 PHARM REV CODE 250: Performed by: HOSPITALIST

## 2021-07-09 PROCEDURE — 99233 SBSQ HOSP IP/OBS HIGH 50: CPT | Mod: ,,, | Performed by: PSYCHIATRY & NEUROLOGY

## 2021-07-09 PROCEDURE — 95718 PR EEG, W/VIDEO, CONT RECORD, I&R, 2-12 HRS: ICD-10-PCS | Mod: ,,, | Performed by: PSYCHIATRY & NEUROLOGY

## 2021-07-09 PROCEDURE — 96372 THER/PROPH/DIAG INJ SC/IM: CPT

## 2021-07-09 PROCEDURE — 25500020 PHARM REV CODE 255: Performed by: HOSPITALIST

## 2021-07-09 PROCEDURE — 80053 COMPREHEN METABOLIC PANEL: CPT | Performed by: HOSPITALIST

## 2021-07-09 PROCEDURE — 95718 EEG PHYS/QHP 2-12 HR W/VEEG: CPT | Mod: ,,, | Performed by: PSYCHIATRY & NEUROLOGY

## 2021-07-09 PROCEDURE — 36415 COLL VENOUS BLD VENIPUNCTURE: CPT | Performed by: STUDENT IN AN ORGANIZED HEALTH CARE EDUCATION/TRAINING PROGRAM

## 2021-07-09 PROCEDURE — 63600175 PHARM REV CODE 636 W HCPCS: Performed by: STUDENT IN AN ORGANIZED HEALTH CARE EDUCATION/TRAINING PROGRAM

## 2021-07-09 PROCEDURE — 99233 PR SUBSEQUENT HOSPITAL CARE,LEVL III: ICD-10-PCS | Mod: GC,,, | Performed by: HOSPITALIST

## 2021-07-09 PROCEDURE — 85379 FIBRIN DEGRADATION QUANT: CPT | Performed by: HOSPITALIST

## 2021-07-09 PROCEDURE — 25000242 PHARM REV CODE 250 ALT 637 W/ HCPCS: Performed by: STUDENT IN AN ORGANIZED HEALTH CARE EDUCATION/TRAINING PROGRAM

## 2021-07-09 PROCEDURE — 99233 PR SUBSEQUENT HOSPITAL CARE,LEVL III: ICD-10-PCS | Mod: ,,, | Performed by: PSYCHIATRY & NEUROLOGY

## 2021-07-09 PROCEDURE — 36415 COLL VENOUS BLD VENIPUNCTURE: CPT | Performed by: HOSPITALIST

## 2021-07-09 PROCEDURE — 99233 SBSQ HOSP IP/OBS HIGH 50: CPT | Mod: GC,,, | Performed by: HOSPITALIST

## 2021-07-09 PROCEDURE — 25000003 PHARM REV CODE 250: Performed by: STUDENT IN AN ORGANIZED HEALTH CARE EDUCATION/TRAINING PROGRAM

## 2021-07-09 RX ORDER — CLONIDINE HYDROCHLORIDE 0.1 MG/1
0.3 TABLET ORAL 3 TIMES DAILY
Status: DISCONTINUED | OUTPATIENT
Start: 2021-07-09 | End: 2021-07-09

## 2021-07-09 RX ORDER — POTASSIUM CHLORIDE 20 MEQ/1
20 TABLET, EXTENDED RELEASE ORAL
Status: CANCELLED | OUTPATIENT
Start: 2021-07-09 | End: 2021-07-09

## 2021-07-09 RX ORDER — MUPIROCIN 20 MG/G
OINTMENT TOPICAL 2 TIMES DAILY
Status: DISCONTINUED | OUTPATIENT
Start: 2021-07-09 | End: 2021-07-12 | Stop reason: HOSPADM

## 2021-07-09 RX ORDER — POTASSIUM CHLORIDE 750 MG/1
30 CAPSULE, EXTENDED RELEASE ORAL
Status: COMPLETED | OUTPATIENT
Start: 2021-07-09 | End: 2021-07-09

## 2021-07-09 RX ADMIN — LEVETIRACETAM 750 MG: 500 TABLET ORAL at 09:07

## 2021-07-09 RX ADMIN — HYDRALAZINE HYDROCHLORIDE 25 MG: 25 TABLET, FILM COATED ORAL at 09:07

## 2021-07-09 RX ADMIN — LEVETIRACETAM 750 MG: 500 TABLET ORAL at 02:07

## 2021-07-09 RX ADMIN — FOLIC ACID 1000 MCG: 1 TABLET ORAL at 09:07

## 2021-07-09 RX ADMIN — FENOFIBRATE 160 MG: 160 TABLET ORAL at 09:07

## 2021-07-09 RX ADMIN — PREDNISONE 5 MG: 5 TABLET ORAL at 09:07

## 2021-07-09 RX ADMIN — CLONIDINE HYDROCHLORIDE 0.3 MG: 0.1 TABLET ORAL at 09:07

## 2021-07-09 RX ADMIN — GABAPENTIN 400 MG: 400 CAPSULE ORAL at 09:07

## 2021-07-09 RX ADMIN — MUPIROCIN: 20 OINTMENT TOPICAL at 09:07

## 2021-07-09 RX ADMIN — CARVEDILOL 25 MG: 25 TABLET, FILM COATED ORAL at 09:07

## 2021-07-09 RX ADMIN — POTASSIUM CHLORIDE 30 MEQ: 10 CAPSULE, COATED, EXTENDED RELEASE ORAL at 10:07

## 2021-07-09 RX ADMIN — GABAPENTIN 400 MG: 400 CAPSULE ORAL at 02:07

## 2021-07-09 RX ADMIN — HEPARIN SODIUM 5000 UNITS: 5000 INJECTION INTRAVENOUS; SUBCUTANEOUS at 02:07

## 2021-07-09 RX ADMIN — HEPARIN SODIUM 5000 UNITS: 5000 INJECTION INTRAVENOUS; SUBCUTANEOUS at 09:07

## 2021-07-09 RX ADMIN — CALCIUM CARBONATE (ANTACID) CHEW TAB 500 MG 500 MG: 500 CHEW TAB at 09:07

## 2021-07-09 RX ADMIN — HEPARIN SODIUM 5000 UNITS: 5000 INJECTION INTRAVENOUS; SUBCUTANEOUS at 05:07

## 2021-07-09 RX ADMIN — CLONIDINE HYDROCHLORIDE 0.3 MG: 0.1 TABLET ORAL at 02:07

## 2021-07-09 RX ADMIN — IOHEXOL 100 ML: 350 INJECTION, SOLUTION INTRAVENOUS at 02:07

## 2021-07-09 RX ADMIN — HYDRALAZINE HYDROCHLORIDE 25 MG: 25 TABLET, FILM COATED ORAL at 02:07

## 2021-07-09 RX ADMIN — NIFEDIPINE 30 MG: 30 TABLET, FILM COATED, EXTENDED RELEASE ORAL at 09:07

## 2021-07-09 RX ADMIN — HYDRALAZINE HYDROCHLORIDE 25 MG: 25 TABLET, FILM COATED ORAL at 05:07

## 2021-07-09 RX ADMIN — ATORVASTATIN CALCIUM 20 MG: 20 TABLET, FILM COATED ORAL at 09:07

## 2021-07-09 RX ADMIN — LEVOTHYROXINE SODIUM 50 MCG: 50 TABLET ORAL at 05:07

## 2021-07-10 PROBLEM — I26.99 PULMONARY EMBOLISM: Status: ACTIVE | Noted: 2021-07-10

## 2021-07-10 LAB
ALBUMIN SERPL BCP-MCNC: 3.5 G/DL (ref 3.5–5.2)
ALP SERPL-CCNC: 55 U/L (ref 55–135)
ALT SERPL W/O P-5'-P-CCNC: 8 U/L (ref 10–44)
ANION GAP SERPL CALC-SCNC: 9 MMOL/L (ref 8–16)
APTT BLDCRRT: 24.9 SEC (ref 21–32)
AST SERPL-CCNC: 12 U/L (ref 10–40)
BASOPHILS # BLD AUTO: 0.01 K/UL (ref 0–0.2)
BASOPHILS NFR BLD: 0.2 % (ref 0–1.9)
BILIRUB SERPL-MCNC: 0.6 MG/DL (ref 0.1–1)
BUN SERPL-MCNC: 21 MG/DL (ref 8–23)
CALCIUM SERPL-MCNC: 9.5 MG/DL (ref 8.7–10.5)
CHLORIDE SERPL-SCNC: 99 MMOL/L (ref 95–110)
CHOLEST SERPL-MCNC: 145 MG/DL (ref 120–199)
CHOLEST/HDLC SERPL: 2.8 {RATIO} (ref 2–5)
CO2 SERPL-SCNC: 25 MMOL/L (ref 23–29)
CREAT SERPL-MCNC: 1.3 MG/DL (ref 0.5–1.4)
DIFFERENTIAL METHOD: ABNORMAL
EOSINOPHIL # BLD AUTO: 0.1 K/UL (ref 0–0.5)
EOSINOPHIL NFR BLD: 1.4 % (ref 0–8)
ERYTHROCYTE [DISTWIDTH] IN BLOOD BY AUTOMATED COUNT: 13.8 % (ref 11.5–14.5)
EST. GFR  (AFRICAN AMERICAN): 46.4 ML/MIN/1.73 M^2
EST. GFR  (NON AFRICAN AMERICAN): 40.2 ML/MIN/1.73 M^2
GLUCOSE SERPL-MCNC: 106 MG/DL (ref 70–110)
HCT VFR BLD AUTO: 35.3 % (ref 37–48.5)
HDLC SERPL-MCNC: 51 MG/DL (ref 40–75)
HDLC SERPL: 35.2 % (ref 20–50)
HGB BLD-MCNC: 11.6 G/DL (ref 12–16)
IMM GRANULOCYTES # BLD AUTO: 0.03 K/UL (ref 0–0.04)
IMM GRANULOCYTES NFR BLD AUTO: 0.6 % (ref 0–0.5)
INR PPP: 0.9 (ref 0.8–1.2)
LDLC SERPL CALC-MCNC: 55.4 MG/DL (ref 63–159)
LYMPHOCYTES # BLD AUTO: 1.5 K/UL (ref 1–4.8)
LYMPHOCYTES NFR BLD: 28.9 % (ref 18–48)
MAGNESIUM SERPL-MCNC: 1.8 MG/DL (ref 1.6–2.6)
MCH RBC QN AUTO: 32.7 PG (ref 27–31)
MCHC RBC AUTO-ENTMCNC: 32.9 G/DL (ref 32–36)
MCV RBC AUTO: 99 FL (ref 82–98)
MONOCYTES # BLD AUTO: 0.5 K/UL (ref 0.3–1)
MONOCYTES NFR BLD: 8.9 % (ref 4–15)
NEUTROPHILS # BLD AUTO: 3 K/UL (ref 1.8–7.7)
NEUTROPHILS NFR BLD: 60 % (ref 38–73)
NONHDLC SERPL-MCNC: 94 MG/DL
NRBC BLD-RTO: 0 /100 WBC
PLATELET # BLD AUTO: 298 K/UL (ref 150–450)
PMV BLD AUTO: 9.3 FL (ref 9.2–12.9)
POCT GLUCOSE: 146 MG/DL (ref 70–110)
POCT GLUCOSE: 147 MG/DL (ref 70–110)
POCT GLUCOSE: 161 MG/DL (ref 70–110)
POTASSIUM SERPL-SCNC: 3.9 MMOL/L (ref 3.5–5.1)
PROT SERPL-MCNC: 6.6 G/DL (ref 6–8.4)
PROTHROMBIN TIME: 10.3 SEC (ref 9–12.5)
RBC # BLD AUTO: 3.55 M/UL (ref 4–5.4)
SODIUM SERPL-SCNC: 133 MMOL/L (ref 136–145)
TRIGL SERPL-MCNC: 193 MG/DL (ref 30–150)
TROPONIN I SERPL DL<=0.01 NG/ML-MCNC: <0.006 NG/ML (ref 0–0.03)
WBC # BLD AUTO: 5.05 K/UL (ref 3.9–12.7)

## 2021-07-10 PROCEDURE — 85025 COMPLETE CBC W/AUTO DIFF WBC: CPT | Performed by: HOSPITALIST

## 2021-07-10 PROCEDURE — 85730 THROMBOPLASTIN TIME PARTIAL: CPT | Performed by: HOSPITALIST

## 2021-07-10 PROCEDURE — 63600175 PHARM REV CODE 636 W HCPCS

## 2021-07-10 PROCEDURE — 25000003 PHARM REV CODE 250: Performed by: HOSPITALIST

## 2021-07-10 PROCEDURE — 80061 LIPID PANEL: CPT

## 2021-07-10 PROCEDURE — 36415 COLL VENOUS BLD VENIPUNCTURE: CPT | Performed by: STUDENT IN AN ORGANIZED HEALTH CARE EDUCATION/TRAINING PROGRAM

## 2021-07-10 PROCEDURE — 99233 SBSQ HOSP IP/OBS HIGH 50: CPT | Mod: GC,,, | Performed by: HOSPITALIST

## 2021-07-10 PROCEDURE — 25000003 PHARM REV CODE 250: Performed by: STUDENT IN AN ORGANIZED HEALTH CARE EDUCATION/TRAINING PROGRAM

## 2021-07-10 PROCEDURE — 97116 GAIT TRAINING THERAPY: CPT

## 2021-07-10 PROCEDURE — 11000001 HC ACUTE MED/SURG PRIVATE ROOM

## 2021-07-10 PROCEDURE — 97165 OT EVAL LOW COMPLEX 30 MIN: CPT

## 2021-07-10 PROCEDURE — 63600175 PHARM REV CODE 636 W HCPCS: Performed by: STUDENT IN AN ORGANIZED HEALTH CARE EDUCATION/TRAINING PROGRAM

## 2021-07-10 PROCEDURE — 25000242 PHARM REV CODE 250 ALT 637 W/ HCPCS: Performed by: STUDENT IN AN ORGANIZED HEALTH CARE EDUCATION/TRAINING PROGRAM

## 2021-07-10 PROCEDURE — 36415 COLL VENOUS BLD VENIPUNCTURE: CPT | Performed by: HOSPITALIST

## 2021-07-10 PROCEDURE — 83735 ASSAY OF MAGNESIUM: CPT | Performed by: STUDENT IN AN ORGANIZED HEALTH CARE EDUCATION/TRAINING PROGRAM

## 2021-07-10 PROCEDURE — 97535 SELF CARE MNGMENT TRAINING: CPT

## 2021-07-10 PROCEDURE — 85730 THROMBOPLASTIN TIME PARTIAL: CPT | Mod: 91

## 2021-07-10 PROCEDURE — 85610 PROTHROMBIN TIME: CPT

## 2021-07-10 PROCEDURE — 36415 COLL VENOUS BLD VENIPUNCTURE: CPT

## 2021-07-10 PROCEDURE — 25500020 PHARM REV CODE 255: Performed by: HOSPITALIST

## 2021-07-10 PROCEDURE — 80053 COMPREHEN METABOLIC PANEL: CPT | Performed by: HOSPITALIST

## 2021-07-10 PROCEDURE — 99233 PR SUBSEQUENT HOSPITAL CARE,LEVL III: ICD-10-PCS | Mod: GC,,, | Performed by: HOSPITALIST

## 2021-07-10 PROCEDURE — 84484 ASSAY OF TROPONIN QUANT: CPT | Performed by: STUDENT IN AN ORGANIZED HEALTH CARE EDUCATION/TRAINING PROGRAM

## 2021-07-10 PROCEDURE — 83880 ASSAY OF NATRIURETIC PEPTIDE: CPT | Performed by: STUDENT IN AN ORGANIZED HEALTH CARE EDUCATION/TRAINING PROGRAM

## 2021-07-10 PROCEDURE — 97162 PT EVAL MOD COMPLEX 30 MIN: CPT

## 2021-07-10 RX ORDER — ASPIRIN 81 MG/1
81 TABLET ORAL DAILY
Status: DISCONTINUED | OUTPATIENT
Start: 2021-07-10 | End: 2021-07-11

## 2021-07-10 RX ORDER — CARVEDILOL 25 MG/1
25 TABLET ORAL 2 TIMES DAILY
Qty: 180 TABLET | Refills: 3 | Status: SHIPPED | OUTPATIENT
Start: 2021-07-10 | End: 2022-03-24

## 2021-07-10 RX ORDER — HYDRALAZINE HYDROCHLORIDE 25 MG/1
25 TABLET, FILM COATED ORAL EVERY 8 HOURS PRN
Status: DISCONTINUED | OUTPATIENT
Start: 2021-07-10 | End: 2021-07-12 | Stop reason: HOSPADM

## 2021-07-10 RX ORDER — HEPARIN SODIUM,PORCINE/D5W 25000/250
0-40 INTRAVENOUS SOLUTION INTRAVENOUS CONTINUOUS
Status: DISCONTINUED | OUTPATIENT
Start: 2021-07-10 | End: 2021-07-12

## 2021-07-10 RX ORDER — HYDRALAZINE HYDROCHLORIDE 25 MG/1
TABLET, FILM COATED ORAL
Qty: 120 TABLET | Refills: 5 | Status: SHIPPED | OUTPATIENT
Start: 2021-07-10 | End: 2021-07-11 | Stop reason: SDUPTHER

## 2021-07-10 RX ORDER — ASPIRIN 81 MG/1
81 TABLET ORAL DAILY
Qty: 60 TABLET | Refills: 5 | Status: SHIPPED | OUTPATIENT
Start: 2021-07-11 | End: 2021-07-11

## 2021-07-10 RX ORDER — LEVETIRACETAM 750 MG/1
750 TABLET ORAL 2 TIMES DAILY
Qty: 60 TABLET | Refills: 11 | Status: SHIPPED | OUTPATIENT
Start: 2021-07-10 | End: 2021-07-27 | Stop reason: ALTCHOICE

## 2021-07-10 RX ORDER — GABAPENTIN 400 MG/1
CAPSULE ORAL
Qty: 90 CAPSULE | Refills: 1 | Status: SHIPPED | OUTPATIENT
Start: 2021-07-10 | End: 2021-08-05

## 2021-07-10 RX ADMIN — CALCIUM CARBONATE (ANTACID) CHEW TAB 500 MG 500 MG: 500 CHEW TAB at 08:07

## 2021-07-10 RX ADMIN — FENOFIBRATE 160 MG: 160 TABLET ORAL at 08:07

## 2021-07-10 RX ADMIN — MUPIROCIN: 20 OINTMENT TOPICAL at 08:07

## 2021-07-10 RX ADMIN — HYDRALAZINE HYDROCHLORIDE 25 MG: 25 TABLET, FILM COATED ORAL at 01:07

## 2021-07-10 RX ADMIN — GABAPENTIN 400 MG: 400 CAPSULE ORAL at 08:07

## 2021-07-10 RX ADMIN — CARVEDILOL 25 MG: 25 TABLET, FILM COATED ORAL at 08:07

## 2021-07-10 RX ADMIN — LEVETIRACETAM 750 MG: 500 TABLET ORAL at 08:07

## 2021-07-10 RX ADMIN — ATORVASTATIN CALCIUM 20 MG: 20 TABLET, FILM COATED ORAL at 08:07

## 2021-07-10 RX ADMIN — ASPIRIN 81 MG: 81 TABLET, COATED ORAL at 01:07

## 2021-07-10 RX ADMIN — MUPIROCIN: 20 OINTMENT TOPICAL at 09:07

## 2021-07-10 RX ADMIN — HEPARIN SODIUM 5000 UNITS: 5000 INJECTION INTRAVENOUS; SUBCUTANEOUS at 01:07

## 2021-07-10 RX ADMIN — GABAPENTIN 400 MG: 400 CAPSULE ORAL at 01:07

## 2021-07-10 RX ADMIN — HYDRALAZINE HYDROCHLORIDE 25 MG: 25 TABLET, FILM COATED ORAL at 09:07

## 2021-07-10 RX ADMIN — IOHEXOL 75 ML: 350 INJECTION, SOLUTION INTRAVENOUS at 04:07

## 2021-07-10 RX ADMIN — HEPARIN SODIUM 5000 UNITS: 5000 INJECTION INTRAVENOUS; SUBCUTANEOUS at 06:07

## 2021-07-10 RX ADMIN — LEVETIRACETAM 750 MG: 500 TABLET ORAL at 09:07

## 2021-07-10 RX ADMIN — LEVOTHYROXINE SODIUM 50 MCG: 50 TABLET ORAL at 06:07

## 2021-07-10 RX ADMIN — FOLIC ACID 1000 MCG: 1 TABLET ORAL at 08:07

## 2021-07-10 RX ADMIN — HEPARIN SODIUM AND DEXTROSE 18 UNITS/KG/HR: 10000; 5 INJECTION INTRAVENOUS at 06:07

## 2021-07-10 RX ADMIN — NIFEDIPINE 30 MG: 30 TABLET, FILM COATED, EXTENDED RELEASE ORAL at 08:07

## 2021-07-10 RX ADMIN — PREDNISONE 5 MG: 5 TABLET ORAL at 08:07

## 2021-07-10 RX ADMIN — CARVEDILOL 25 MG: 25 TABLET, FILM COATED ORAL at 09:07

## 2021-07-10 RX ADMIN — GABAPENTIN 400 MG: 400 CAPSULE ORAL at 09:07

## 2021-07-11 LAB
ALBUMIN SERPL BCP-MCNC: 4.2 G/DL (ref 3.5–5.2)
ALP SERPL-CCNC: 63 U/L (ref 55–135)
ALT SERPL W/O P-5'-P-CCNC: 8 U/L (ref 10–44)
ANION GAP SERPL CALC-SCNC: 15 MMOL/L (ref 8–16)
APTT BLDCRRT: 105 SEC (ref 21–32)
APTT BLDCRRT: 40.7 SEC (ref 21–32)
APTT BLDCRRT: 46.4 SEC (ref 21–32)
APTT BLDCRRT: 47.8 SEC (ref 21–32)
AST SERPL-CCNC: 17 U/L (ref 10–40)
BASOPHILS # BLD AUTO: 0.02 K/UL (ref 0–0.2)
BASOPHILS NFR BLD: 0.3 % (ref 0–1.9)
BILIRUB SERPL-MCNC: 0.6 MG/DL (ref 0.1–1)
BNP SERPL-MCNC: <10 PG/ML (ref 0–99)
BUN SERPL-MCNC: 18 MG/DL (ref 8–23)
CALCIUM SERPL-MCNC: 10.2 MG/DL (ref 8.7–10.5)
CHLORIDE SERPL-SCNC: 100 MMOL/L (ref 95–110)
CO2 SERPL-SCNC: 20 MMOL/L (ref 23–29)
CREAT SERPL-MCNC: 1.1 MG/DL (ref 0.5–1.4)
DIFFERENTIAL METHOD: ABNORMAL
EOSINOPHIL # BLD AUTO: 0.1 K/UL (ref 0–0.5)
EOSINOPHIL NFR BLD: 1 % (ref 0–8)
ERYTHROCYTE [DISTWIDTH] IN BLOOD BY AUTOMATED COUNT: 13.9 % (ref 11.5–14.5)
EST. GFR  (AFRICAN AMERICAN): 56.8 ML/MIN/1.73 M^2
EST. GFR  (NON AFRICAN AMERICAN): 49.2 ML/MIN/1.73 M^2
GLUCOSE SERPL-MCNC: 130 MG/DL (ref 70–110)
HCT VFR BLD AUTO: 42.1 % (ref 37–48.5)
HGB BLD-MCNC: 13.6 G/DL (ref 12–16)
IMM GRANULOCYTES # BLD AUTO: 0.05 K/UL (ref 0–0.04)
IMM GRANULOCYTES NFR BLD AUTO: 0.7 % (ref 0–0.5)
LYMPHOCYTES # BLD AUTO: 1.6 K/UL (ref 1–4.8)
LYMPHOCYTES NFR BLD: 23 % (ref 18–48)
MAGNESIUM SERPL-MCNC: 1.7 MG/DL (ref 1.6–2.6)
MCH RBC QN AUTO: 32.4 PG (ref 27–31)
MCHC RBC AUTO-ENTMCNC: 32.3 G/DL (ref 32–36)
MCV RBC AUTO: 100 FL (ref 82–98)
MONOCYTES # BLD AUTO: 0.7 K/UL (ref 0.3–1)
MONOCYTES NFR BLD: 9.7 % (ref 4–15)
NEUTROPHILS # BLD AUTO: 4.6 K/UL (ref 1.8–7.7)
NEUTROPHILS NFR BLD: 65.3 % (ref 38–73)
NRBC BLD-RTO: 0 /100 WBC
PLATELET # BLD AUTO: 380 K/UL (ref 150–450)
PMV BLD AUTO: 9.9 FL (ref 9.2–12.9)
POCT GLUCOSE: 135 MG/DL (ref 70–110)
POCT GLUCOSE: 147 MG/DL (ref 70–110)
POCT GLUCOSE: 197 MG/DL (ref 70–110)
POCT GLUCOSE: 265 MG/DL (ref 70–110)
POTASSIUM SERPL-SCNC: 4 MMOL/L (ref 3.5–5.1)
PROT SERPL-MCNC: 8 G/DL (ref 6–8.4)
RBC # BLD AUTO: 4.2 M/UL (ref 4–5.4)
SODIUM SERPL-SCNC: 135 MMOL/L (ref 136–145)
WBC # BLD AUTO: 7.01 K/UL (ref 3.9–12.7)

## 2021-07-11 PROCEDURE — 25000242 PHARM REV CODE 250 ALT 637 W/ HCPCS: Performed by: STUDENT IN AN ORGANIZED HEALTH CARE EDUCATION/TRAINING PROGRAM

## 2021-07-11 PROCEDURE — 36415 COLL VENOUS BLD VENIPUNCTURE: CPT

## 2021-07-11 PROCEDURE — 25000003 PHARM REV CODE 250

## 2021-07-11 PROCEDURE — 99233 SBSQ HOSP IP/OBS HIGH 50: CPT | Mod: GC,,, | Performed by: HOSPITALIST

## 2021-07-11 PROCEDURE — 25000003 PHARM REV CODE 250: Performed by: STUDENT IN AN ORGANIZED HEALTH CARE EDUCATION/TRAINING PROGRAM

## 2021-07-11 PROCEDURE — 99233 PR SUBSEQUENT HOSPITAL CARE,LEVL III: ICD-10-PCS | Mod: GC,,, | Performed by: HOSPITALIST

## 2021-07-11 PROCEDURE — 25000003 PHARM REV CODE 250: Performed by: HOSPITALIST

## 2021-07-11 PROCEDURE — 80053 COMPREHEN METABOLIC PANEL: CPT | Performed by: HOSPITALIST

## 2021-07-11 PROCEDURE — 63600175 PHARM REV CODE 636 W HCPCS: Performed by: STUDENT IN AN ORGANIZED HEALTH CARE EDUCATION/TRAINING PROGRAM

## 2021-07-11 PROCEDURE — 85730 THROMBOPLASTIN TIME PARTIAL: CPT | Mod: 91 | Performed by: HOSPITALIST

## 2021-07-11 PROCEDURE — 83735 ASSAY OF MAGNESIUM: CPT | Performed by: STUDENT IN AN ORGANIZED HEALTH CARE EDUCATION/TRAINING PROGRAM

## 2021-07-11 PROCEDURE — 11000001 HC ACUTE MED/SURG PRIVATE ROOM

## 2021-07-11 PROCEDURE — 85025 COMPLETE CBC W/AUTO DIFF WBC: CPT | Performed by: HOSPITALIST

## 2021-07-11 PROCEDURE — 36415 COLL VENOUS BLD VENIPUNCTURE: CPT | Performed by: HOSPITALIST

## 2021-07-11 PROCEDURE — 85730 THROMBOPLASTIN TIME PARTIAL: CPT

## 2021-07-11 PROCEDURE — 63600175 PHARM REV CODE 636 W HCPCS

## 2021-07-11 RX ORDER — NIFEDIPINE 90 MG/1
90 TABLET, EXTENDED RELEASE ORAL DAILY
Qty: 30 TABLET | Refills: 11 | Status: SHIPPED | OUTPATIENT
Start: 2021-07-12 | End: 2021-07-11

## 2021-07-11 RX ORDER — NIFEDIPINE 30 MG/1
60 TABLET, EXTENDED RELEASE ORAL DAILY
Status: DISCONTINUED | OUTPATIENT
Start: 2021-07-11 | End: 2021-07-12 | Stop reason: HOSPADM

## 2021-07-11 RX ORDER — NAPROXEN SODIUM 220 MG/1
TABLET, FILM COATED ORAL
Status: DISPENSED
Start: 2021-07-11 | End: 2021-07-11

## 2021-07-11 RX ORDER — NIFEDIPINE 30 MG/1
90 TABLET, EXTENDED RELEASE ORAL DAILY
Status: DISCONTINUED | OUTPATIENT
Start: 2021-07-11 | End: 2021-07-11

## 2021-07-11 RX ORDER — HYDRALAZINE HYDROCHLORIDE 50 MG/1
50 TABLET, FILM COATED ORAL EVERY 8 HOURS
Status: DISCONTINUED | OUTPATIENT
Start: 2021-07-11 | End: 2021-07-12 | Stop reason: HOSPADM

## 2021-07-11 RX ORDER — NAPROXEN SODIUM 220 MG/1
81 TABLET, FILM COATED ORAL DAILY
Status: DISCONTINUED | OUTPATIENT
Start: 2021-07-11 | End: 2021-07-12

## 2021-07-11 RX ORDER — ASPIRIN 81 MG/1
81 TABLET ORAL DAILY
Qty: 60 TABLET | Refills: 5 | Status: SHIPPED | OUTPATIENT
Start: 2021-07-11 | End: 2021-07-12 | Stop reason: HOSPADM

## 2021-07-11 RX ORDER — HYDRALAZINE HYDROCHLORIDE 25 MG/1
TABLET, FILM COATED ORAL
Qty: 120 TABLET | Refills: 10 | Status: SHIPPED | OUTPATIENT
Start: 2021-07-11 | End: 2022-07-06 | Stop reason: SDUPTHER

## 2021-07-11 RX ORDER — NIFEDIPINE 60 MG/1
60 TABLET, EXTENDED RELEASE ORAL DAILY
Qty: 30 TABLET | Refills: 11 | Status: SHIPPED | OUTPATIENT
Start: 2021-07-12 | End: 2022-07-18 | Stop reason: SDUPTHER

## 2021-07-11 RX ADMIN — LEVETIRACETAM 750 MG: 500 TABLET ORAL at 08:07

## 2021-07-11 RX ADMIN — MUPIROCIN: 20 OINTMENT TOPICAL at 09:07

## 2021-07-11 RX ADMIN — GABAPENTIN 400 MG: 400 CAPSULE ORAL at 08:07

## 2021-07-11 RX ADMIN — CALCIUM CARBONATE (ANTACID) CHEW TAB 500 MG 500 MG: 500 CHEW TAB at 08:07

## 2021-07-11 RX ADMIN — ASPIRIN 81 MG CHEWABLE TABLET 81 MG: 81 TABLET CHEWABLE at 08:07

## 2021-07-11 RX ADMIN — HYDRALAZINE HYDROCHLORIDE 50 MG: 50 TABLET, FILM COATED ORAL at 09:07

## 2021-07-11 RX ADMIN — HEPARIN SODIUM AND DEXTROSE 14 UNITS/KG/HR: 10000; 5 INJECTION INTRAVENOUS at 09:07

## 2021-07-11 RX ADMIN — FOLIC ACID 1000 MCG: 1 TABLET ORAL at 08:07

## 2021-07-11 RX ADMIN — MUPIROCIN: 20 OINTMENT TOPICAL at 08:07

## 2021-07-11 RX ADMIN — CARVEDILOL 25 MG: 25 TABLET, FILM COATED ORAL at 08:07

## 2021-07-11 RX ADMIN — HYDROCODONE BITARTRATE AND ACETAMINOPHEN 1 TABLET: 5; 325 TABLET ORAL at 04:07

## 2021-07-11 RX ADMIN — LEVOTHYROXINE SODIUM 50 MCG: 50 TABLET ORAL at 05:07

## 2021-07-11 RX ADMIN — FENOFIBRATE 160 MG: 160 TABLET ORAL at 08:07

## 2021-07-11 RX ADMIN — ATORVASTATIN CALCIUM 20 MG: 20 TABLET, FILM COATED ORAL at 08:07

## 2021-07-11 RX ADMIN — GABAPENTIN 400 MG: 400 CAPSULE ORAL at 02:07

## 2021-07-11 RX ADMIN — GABAPENTIN 400 MG: 400 CAPSULE ORAL at 09:07

## 2021-07-11 RX ADMIN — HYDRALAZINE HYDROCHLORIDE 50 MG: 50 TABLET, FILM COATED ORAL at 01:07

## 2021-07-11 RX ADMIN — HYDRALAZINE HYDROCHLORIDE 25 MG: 25 TABLET, FILM COATED ORAL at 05:07

## 2021-07-11 RX ADMIN — NIFEDIPINE 60 MG: 30 TABLET, FILM COATED, EXTENDED RELEASE ORAL at 08:07

## 2021-07-11 RX ADMIN — CARVEDILOL 25 MG: 25 TABLET, FILM COATED ORAL at 09:07

## 2021-07-11 RX ADMIN — PREDNISONE 5 MG: 5 TABLET ORAL at 08:07

## 2021-07-11 RX ADMIN — LEVETIRACETAM 750 MG: 500 TABLET ORAL at 09:07

## 2021-07-12 ENCOUNTER — TELEPHONE (OUTPATIENT)
Dept: PHARMACY | Facility: CLINIC | Age: 76
End: 2021-07-12

## 2021-07-12 ENCOUNTER — TELEPHONE (OUTPATIENT)
Dept: FAMILY MEDICINE | Facility: CLINIC | Age: 76
End: 2021-07-12

## 2021-07-12 VITALS
SYSTOLIC BLOOD PRESSURE: 133 MMHG | HEIGHT: 62 IN | HEART RATE: 86 BPM | DIASTOLIC BLOOD PRESSURE: 70 MMHG | OXYGEN SATURATION: 96 % | RESPIRATION RATE: 14 BRPM | TEMPERATURE: 98 F | WEIGHT: 122 LBS | BODY MASS INDEX: 22.45 KG/M2

## 2021-07-12 LAB
ALBUMIN SERPL BCP-MCNC: 3.4 G/DL (ref 3.5–5.2)
ALP SERPL-CCNC: 51 U/L (ref 55–135)
ALT SERPL W/O P-5'-P-CCNC: 9 U/L (ref 10–44)
ANION GAP SERPL CALC-SCNC: 15 MMOL/L (ref 8–16)
APTT BLDCRRT: 43.4 SEC (ref 21–32)
ASCENDING AORTA: 3.03 CM
AST SERPL-CCNC: 13 U/L (ref 10–40)
AV INDEX (PROSTH): 0.96
AV MEAN GRADIENT: 4 MMHG
AV PEAK GRADIENT: 6 MMHG
AV VALVE AREA: 2.65 CM2
AV VELOCITY RATIO: 0.78
BASOPHILS # BLD AUTO: 0.02 K/UL (ref 0–0.2)
BASOPHILS NFR BLD: 0.4 % (ref 0–1.9)
BILIRUB SERPL-MCNC: 0.5 MG/DL (ref 0.1–1)
BSA FOR ECHO PROCEDURE: 1.56 M2
BUN SERPL-MCNC: 19 MG/DL (ref 8–23)
CALCIUM SERPL-MCNC: 9.1 MG/DL (ref 8.7–10.5)
CHLORIDE SERPL-SCNC: 95 MMOL/L (ref 95–110)
CO2 SERPL-SCNC: 17 MMOL/L (ref 23–29)
CREAT SERPL-MCNC: 1.2 MG/DL (ref 0.5–1.4)
CV ECHO LV RWT: 0.45 CM
DIFFERENTIAL METHOD: ABNORMAL
DOP CALC AO PEAK VEL: 1.27 M/S
DOP CALC AO VTI: 21.14 CM
DOP CALC LVOT AREA: 2.7 CM2
DOP CALC LVOT DIAMETER: 1.87 CM
DOP CALC LVOT PEAK VEL: 0.99 M/S
DOP CALC LVOT STROKE VOLUME: 56 CM3
DOP CALCLVOT PEAK VEL VTI: 20.4 CM
E/A RATIO: 0.55
E/E' RATIO: 11 M/S
ECHO LV POSTERIOR WALL: 0.81 CM (ref 0.6–1.1)
EJECTION FRACTION: 60 %
EOSINOPHIL # BLD AUTO: 0.1 K/UL (ref 0–0.5)
EOSINOPHIL NFR BLD: 1.5 % (ref 0–8)
ERYTHROCYTE [DISTWIDTH] IN BLOOD BY AUTOMATED COUNT: 13.5 % (ref 11.5–14.5)
EST. GFR  (AFRICAN AMERICAN): 51.1 ML/MIN/1.73 M^2
EST. GFR  (NON AFRICAN AMERICAN): 44.3 ML/MIN/1.73 M^2
FRACTIONAL SHORTENING: 44 % (ref 28–44)
GLUCOSE SERPL-MCNC: 128 MG/DL (ref 70–110)
HCT VFR BLD AUTO: 33.4 % (ref 37–48.5)
HGB BLD-MCNC: 11.2 G/DL (ref 12–16)
IMM GRANULOCYTES # BLD AUTO: 0.02 K/UL (ref 0–0.04)
IMM GRANULOCYTES NFR BLD AUTO: 0.4 % (ref 0–0.5)
INTERVENTRICULAR SEPTUM: 0.8 CM (ref 0.6–1.1)
LA MAJOR: 5.56 CM
LA MINOR: 5.05 CM
LA WIDTH: 3.82 CM
LEFT ATRIUM SIZE: 2.64 CM
LEFT ATRIUM VOLUME INDEX: 29.3 ML/M2
LEFT ATRIUM VOLUME: 45.37 CM3
LEFT INTERNAL DIMENSION IN SYSTOLE: 2.03 CM (ref 2.1–4)
LEFT VENTRICLE DIASTOLIC VOLUME INDEX: 35.29 ML/M2
LEFT VENTRICLE DIASTOLIC VOLUME: 54.7 ML
LEFT VENTRICLE MASS INDEX: 51 G/M2
LEFT VENTRICLE SYSTOLIC VOLUME INDEX: 8.5 ML/M2
LEFT VENTRICLE SYSTOLIC VOLUME: 13.21 ML
LEFT VENTRICULAR INTERNAL DIMENSION IN DIASTOLE: 3.61 CM (ref 3.5–6)
LEFT VENTRICULAR MASS: 79.8 G
LV LATERAL E/E' RATIO: 9.17 M/S
LV SEPTAL E/E' RATIO: 13.75 M/S
LYMPHOCYTES # BLD AUTO: 1.7 K/UL (ref 1–4.8)
LYMPHOCYTES NFR BLD: 32.9 % (ref 18–48)
MAGNESIUM SERPL-MCNC: 1.5 MG/DL (ref 1.6–2.6)
MCH RBC QN AUTO: 33 PG (ref 27–31)
MCHC RBC AUTO-ENTMCNC: 33.5 G/DL (ref 32–36)
MCV RBC AUTO: 99 FL (ref 82–98)
MONOCYTES # BLD AUTO: 0.6 K/UL (ref 0.3–1)
MONOCYTES NFR BLD: 11.2 % (ref 4–15)
MV PEAK A VEL: 1 M/S
MV PEAK E VEL: 0.55 M/S
NEUTROPHILS # BLD AUTO: 2.8 K/UL (ref 1.8–7.7)
NEUTROPHILS NFR BLD: 53.6 % (ref 38–73)
NRBC BLD-RTO: 0 /100 WBC
PISA TR MAX VEL: 2.43 M/S
PLATELET # BLD AUTO: 304 K/UL (ref 150–450)
PMV BLD AUTO: 9.9 FL (ref 9.2–12.9)
POCT GLUCOSE: 160 MG/DL (ref 70–110)
POCT GLUCOSE: 235 MG/DL (ref 70–110)
POTASSIUM SERPL-SCNC: 3.8 MMOL/L (ref 3.5–5.1)
PROT SERPL-MCNC: 6.5 G/DL (ref 6–8.4)
RA MAJOR: 4.99 CM
RA PRESSURE: 3 MMHG
RA WIDTH: 3.05 CM
RBC # BLD AUTO: 3.39 M/UL (ref 4–5.4)
RIGHT VENTRICULAR END-DIASTOLIC DIMENSION: 3.02 CM
RV TISSUE DOPPLER FREE WALL SYSTOLIC VELOCITY 1 (APICAL 4 CHAMBER VIEW): 17 CM/S
SINUS: 2.6 CM
SODIUM SERPL-SCNC: 127 MMOL/L (ref 136–145)
STJ: 2.77 CM
TDI LATERAL: 0.06 M/S
TDI SEPTAL: 0.04 M/S
TDI: 0.05 M/S
TR MAX PG: 24 MMHG
TRICUSPID ANNULAR PLANE SYSTOLIC EXCURSION: 1.82 CM
TV REST PULMONARY ARTERY PRESSURE: 27 MMHG
WBC # BLD AUTO: 5.29 K/UL (ref 3.9–12.7)

## 2021-07-12 PROCEDURE — 1111F PR DISCHARGE MEDS RECONCILED W/ CURRENT OUTPATIENT MED LIST: ICD-10-PCS | Mod: CPTII,GC,, | Performed by: HOSPITALIST

## 2021-07-12 PROCEDURE — 99239 HOSP IP/OBS DSCHRG MGMT >30: CPT | Mod: GC,,, | Performed by: HOSPITALIST

## 2021-07-12 PROCEDURE — 85025 COMPLETE CBC W/AUTO DIFF WBC: CPT | Performed by: HOSPITALIST

## 2021-07-12 PROCEDURE — 36415 COLL VENOUS BLD VENIPUNCTURE: CPT

## 2021-07-12 PROCEDURE — 85730 THROMBOPLASTIN TIME PARTIAL: CPT

## 2021-07-12 PROCEDURE — 63600175 PHARM REV CODE 636 W HCPCS: Performed by: HOSPITALIST

## 2021-07-12 PROCEDURE — 99239 PR HOSPITAL DISCHARGE DAY,>30 MIN: ICD-10-PCS | Mod: GC,,, | Performed by: HOSPITALIST

## 2021-07-12 PROCEDURE — 25000003 PHARM REV CODE 250: Performed by: STUDENT IN AN ORGANIZED HEALTH CARE EDUCATION/TRAINING PROGRAM

## 2021-07-12 PROCEDURE — 83735 ASSAY OF MAGNESIUM: CPT | Performed by: STUDENT IN AN ORGANIZED HEALTH CARE EDUCATION/TRAINING PROGRAM

## 2021-07-12 PROCEDURE — 25000242 PHARM REV CODE 250 ALT 637 W/ HCPCS: Performed by: STUDENT IN AN ORGANIZED HEALTH CARE EDUCATION/TRAINING PROGRAM

## 2021-07-12 PROCEDURE — 63600175 PHARM REV CODE 636 W HCPCS: Performed by: STUDENT IN AN ORGANIZED HEALTH CARE EDUCATION/TRAINING PROGRAM

## 2021-07-12 PROCEDURE — 25000003 PHARM REV CODE 250

## 2021-07-12 PROCEDURE — 1111F DSCHRG MED/CURRENT MED MERGE: CPT | Mod: CPTII,GC,, | Performed by: HOSPITALIST

## 2021-07-12 PROCEDURE — 25000003 PHARM REV CODE 250: Performed by: HOSPITALIST

## 2021-07-12 PROCEDURE — 80053 COMPREHEN METABOLIC PANEL: CPT | Performed by: HOSPITALIST

## 2021-07-12 RX ORDER — MAGNESIUM SULFATE HEPTAHYDRATE 40 MG/ML
2 INJECTION, SOLUTION INTRAVENOUS ONCE
Status: COMPLETED | OUTPATIENT
Start: 2021-07-12 | End: 2021-07-12

## 2021-07-12 RX ADMIN — CALCIUM CARBONATE (ANTACID) CHEW TAB 500 MG 500 MG: 500 CHEW TAB at 08:07

## 2021-07-12 RX ADMIN — MAGNESIUM SULFATE HEPTAHYDRATE 2 G: 40 INJECTION, SOLUTION INTRAVENOUS at 09:07

## 2021-07-12 RX ADMIN — LEVOTHYROXINE SODIUM 50 MCG: 50 TABLET ORAL at 05:07

## 2021-07-12 RX ADMIN — ATORVASTATIN CALCIUM 20 MG: 20 TABLET, FILM COATED ORAL at 08:07

## 2021-07-12 RX ADMIN — HYDRALAZINE HYDROCHLORIDE 50 MG: 50 TABLET, FILM COATED ORAL at 05:07

## 2021-07-12 RX ADMIN — APIXABAN 10 MG: 5 TABLET, FILM COATED ORAL at 12:07

## 2021-07-12 RX ADMIN — GABAPENTIN 400 MG: 400 CAPSULE ORAL at 02:07

## 2021-07-12 RX ADMIN — MUPIROCIN: 20 OINTMENT TOPICAL at 08:07

## 2021-07-12 RX ADMIN — HYDRALAZINE HYDROCHLORIDE 50 MG: 50 TABLET, FILM COATED ORAL at 01:07

## 2021-07-12 RX ADMIN — PREDNISONE 5 MG: 5 TABLET ORAL at 08:07

## 2021-07-12 RX ADMIN — FOLIC ACID 1000 MCG: 1 TABLET ORAL at 08:07

## 2021-07-12 RX ADMIN — NIFEDIPINE 60 MG: 30 TABLET, FILM COATED, EXTENDED RELEASE ORAL at 08:07

## 2021-07-12 RX ADMIN — CARVEDILOL 25 MG: 25 TABLET, FILM COATED ORAL at 08:07

## 2021-07-12 RX ADMIN — FENOFIBRATE 160 MG: 160 TABLET ORAL at 08:07

## 2021-07-12 RX ADMIN — GABAPENTIN 400 MG: 400 CAPSULE ORAL at 08:07

## 2021-07-12 RX ADMIN — ASPIRIN 81 MG CHEWABLE TABLET 81 MG: 81 TABLET CHEWABLE at 08:07

## 2021-07-12 RX ADMIN — LEVETIRACETAM 750 MG: 500 TABLET ORAL at 08:07

## 2021-07-13 ENCOUNTER — PATIENT OUTREACH (OUTPATIENT)
Dept: ADMINISTRATIVE | Facility: CLINIC | Age: 76
End: 2021-07-13

## 2021-07-13 ENCOUNTER — NURSE TRIAGE (OUTPATIENT)
Dept: ADMINISTRATIVE | Facility: CLINIC | Age: 76
End: 2021-07-13

## 2021-07-15 ENCOUNTER — LAB VISIT (OUTPATIENT)
Dept: LAB | Facility: HOSPITAL | Age: 76
End: 2021-07-15
Attending: FAMILY MEDICINE
Payer: MEDICARE

## 2021-07-15 ENCOUNTER — OFFICE VISIT (OUTPATIENT)
Dept: FAMILY MEDICINE | Facility: CLINIC | Age: 76
End: 2021-07-15
Payer: MEDICARE

## 2021-07-15 VITALS
TEMPERATURE: 98 F | SYSTOLIC BLOOD PRESSURE: 110 MMHG | HEART RATE: 88 BPM | OXYGEN SATURATION: 96 % | DIASTOLIC BLOOD PRESSURE: 62 MMHG

## 2021-07-15 DIAGNOSIS — G72.9 MYOPATHY: ICD-10-CM

## 2021-07-15 DIAGNOSIS — I10 ESSENTIAL HYPERTENSION: Chronic | ICD-10-CM

## 2021-07-15 DIAGNOSIS — S22.048S: ICD-10-CM

## 2021-07-15 DIAGNOSIS — I26.99 PULMONARY EMBOLISM, UNSPECIFIED CHRONICITY, UNSPECIFIED PULMONARY EMBOLISM TYPE, UNSPECIFIED WHETHER ACUTE COR PULMONALE PRESENT: ICD-10-CM

## 2021-07-15 DIAGNOSIS — Z09 HOSPITAL DISCHARGE FOLLOW-UP: Primary | ICD-10-CM

## 2021-07-15 DIAGNOSIS — E03.8 OTHER SPECIFIED HYPOTHYROIDISM: Chronic | ICD-10-CM

## 2021-07-15 DIAGNOSIS — Z99.89 USES WALKER: ICD-10-CM

## 2021-07-15 DIAGNOSIS — M51.36 DEGENERATIVE DISC DISEASE, LUMBAR: ICD-10-CM

## 2021-07-15 DIAGNOSIS — R56.9 SEIZURE-LIKE ACTIVITY: ICD-10-CM

## 2021-07-15 DIAGNOSIS — D86.9 SARCOIDOSIS: Chronic | ICD-10-CM

## 2021-07-15 DIAGNOSIS — Z74.09 IMPAIRED MOBILITY: ICD-10-CM

## 2021-07-15 DIAGNOSIS — E11.3292 CONTROLLED TYPE 2 DIABETES MELLITUS WITH LEFT EYE AFFECTED BY MILD NONPROLIFERATIVE RETINOPATHY WITHOUT MACULAR EDEMA, WITHOUT LONG-TERM CURRENT USE OF INSULIN: Chronic | ICD-10-CM

## 2021-07-15 PROCEDURE — 3074F PR MOST RECENT SYSTOLIC BLOOD PRESSURE < 130 MM HG: ICD-10-PCS | Mod: CPTII,S$GLB,, | Performed by: FAMILY MEDICINE

## 2021-07-15 PROCEDURE — 3072F LOW RISK FOR RETINOPATHY: CPT | Mod: S$GLB,,, | Performed by: FAMILY MEDICINE

## 2021-07-15 PROCEDURE — 3078F DIAST BP <80 MM HG: CPT | Mod: CPTII,S$GLB,, | Performed by: FAMILY MEDICINE

## 2021-07-15 PROCEDURE — 80177 DRUG SCRN QUAN LEVETIRACETAM: CPT | Performed by: FAMILY MEDICINE

## 2021-07-15 PROCEDURE — 3074F SYST BP LT 130 MM HG: CPT | Mod: CPTII,S$GLB,, | Performed by: FAMILY MEDICINE

## 2021-07-15 PROCEDURE — 99495 TCM SERVICES (MODERATE COMPLEXITY): ICD-10-PCS | Mod: S$GLB,,, | Performed by: FAMILY MEDICINE

## 2021-07-15 PROCEDURE — 3044F PR MOST RECENT HEMOGLOBIN A1C LEVEL <7.0%: ICD-10-PCS | Mod: CPTII,S$GLB,, | Performed by: FAMILY MEDICINE

## 2021-07-15 PROCEDURE — 1157F PR ADVANCE CARE PLAN OR EQUIV PRESENT IN MEDICAL RECORD: ICD-10-PCS | Mod: S$GLB,,, | Performed by: FAMILY MEDICINE

## 2021-07-15 PROCEDURE — 1157F ADVNC CARE PLAN IN RCRD: CPT | Mod: S$GLB,,, | Performed by: FAMILY MEDICINE

## 2021-07-15 PROCEDURE — 1125F AMNT PAIN NOTED PAIN PRSNT: CPT | Mod: S$GLB,,, | Performed by: FAMILY MEDICINE

## 2021-07-15 PROCEDURE — 3072F PR LOW RISK FOR RETINOPATHY: ICD-10-PCS | Mod: S$GLB,,, | Performed by: FAMILY MEDICINE

## 2021-07-15 PROCEDURE — 3288F FALL RISK ASSESSMENT DOCD: CPT | Mod: CPTII,S$GLB,, | Performed by: FAMILY MEDICINE

## 2021-07-15 PROCEDURE — 3288F PR FALLS RISK ASSESSMENT DOCUMENTED: ICD-10-PCS | Mod: CPTII,S$GLB,, | Performed by: FAMILY MEDICINE

## 2021-07-15 PROCEDURE — 99499 UNLISTED E&M SERVICE: CPT | Mod: S$GLB,,, | Performed by: FAMILY MEDICINE

## 2021-07-15 PROCEDURE — 99999 PR PBB SHADOW E&M-EST. PATIENT-LVL III: ICD-10-PCS | Mod: PBBFAC,,, | Performed by: FAMILY MEDICINE

## 2021-07-15 PROCEDURE — 99495 TRANSJ CARE MGMT MOD F2F 14D: CPT | Mod: S$GLB,,, | Performed by: FAMILY MEDICINE

## 2021-07-15 PROCEDURE — 36415 COLL VENOUS BLD VENIPUNCTURE: CPT | Mod: PO | Performed by: FAMILY MEDICINE

## 2021-07-15 PROCEDURE — 1125F PR PAIN SEVERITY QUANTIFIED, PAIN PRESENT: ICD-10-PCS | Mod: S$GLB,,, | Performed by: FAMILY MEDICINE

## 2021-07-15 PROCEDURE — 99499 RISK ADDL DX/OHS AUDIT: ICD-10-PCS | Mod: S$GLB,,, | Performed by: FAMILY MEDICINE

## 2021-07-15 PROCEDURE — 99999 PR PBB SHADOW E&M-EST. PATIENT-LVL III: CPT | Mod: PBBFAC,,, | Performed by: FAMILY MEDICINE

## 2021-07-15 PROCEDURE — 1100F PR PT FALLS ASSESS DOC 2+ FALLS/FALL W/INJURY/YR: ICD-10-PCS | Mod: CPTII,S$GLB,, | Performed by: FAMILY MEDICINE

## 2021-07-15 PROCEDURE — 1100F PTFALLS ASSESS-DOCD GE2>/YR: CPT | Mod: CPTII,S$GLB,, | Performed by: FAMILY MEDICINE

## 2021-07-15 PROCEDURE — 3044F HG A1C LEVEL LT 7.0%: CPT | Mod: CPTII,S$GLB,, | Performed by: FAMILY MEDICINE

## 2021-07-15 PROCEDURE — 3078F PR MOST RECENT DIASTOLIC BLOOD PRESSURE < 80 MM HG: ICD-10-PCS | Mod: CPTII,S$GLB,, | Performed by: FAMILY MEDICINE

## 2021-07-15 RX ORDER — FOLIC ACID 1 MG/1
1000 TABLET ORAL DAILY
Qty: 90 TABLET | Refills: 1 | Status: SHIPPED | OUTPATIENT
Start: 2021-07-15 | End: 2022-01-12 | Stop reason: SDUPTHER

## 2021-07-19 LAB — LEVETIRACETAM SERPL-MCNC: 42.4 UG/ML (ref 3–60)

## 2021-07-23 ENCOUNTER — INFUSION (OUTPATIENT)
Dept: INFUSION THERAPY | Facility: HOSPITAL | Age: 76
End: 2021-07-23
Attending: INTERNAL MEDICINE
Payer: MEDICARE

## 2021-07-23 VITALS
DIASTOLIC BLOOD PRESSURE: 68 MMHG | RESPIRATION RATE: 16 BRPM | OXYGEN SATURATION: 97 % | HEART RATE: 84 BPM | TEMPERATURE: 98 F | SYSTOLIC BLOOD PRESSURE: 131 MMHG

## 2021-07-23 DIAGNOSIS — D50.9 IRON DEFICIENCY ANEMIA, UNSPECIFIED IRON DEFICIENCY ANEMIA TYPE: ICD-10-CM

## 2021-07-23 DIAGNOSIS — N18.9 ANEMIA IN CHRONIC KIDNEY DISEASE, UNSPECIFIED CKD STAGE: Primary | ICD-10-CM

## 2021-07-23 DIAGNOSIS — D63.1 ANEMIA IN CHRONIC KIDNEY DISEASE, UNSPECIFIED CKD STAGE: Primary | ICD-10-CM

## 2021-07-23 DIAGNOSIS — Z53.1 REFUSAL OF BLOOD TRANSFUSIONS AS PATIENT IS JEHOVAH'S WITNESS: ICD-10-CM

## 2021-07-23 PROCEDURE — 63600175 PHARM REV CODE 636 W HCPCS: Mod: TB | Performed by: INTERNAL MEDICINE

## 2021-07-23 PROCEDURE — 96372 THER/PROPH/DIAG INJ SC/IM: CPT

## 2021-07-23 RX ADMIN — EPOETIN ALFA-EPBX 20000 UNITS: 20000 INJECTION, SOLUTION INTRAVENOUS; SUBCUTANEOUS at 11:07

## 2021-07-27 ENCOUNTER — OFFICE VISIT (OUTPATIENT)
Dept: NEUROLOGY | Facility: CLINIC | Age: 76
End: 2021-07-27
Payer: MEDICARE

## 2021-07-27 ENCOUNTER — TELEPHONE (OUTPATIENT)
Dept: NEUROSURGERY | Facility: CLINIC | Age: 76
End: 2021-07-27

## 2021-07-27 VITALS
WEIGHT: 113.31 LBS | HEART RATE: 110 BPM | DIASTOLIC BLOOD PRESSURE: 81 MMHG | SYSTOLIC BLOOD PRESSURE: 173 MMHG | BODY MASS INDEX: 20.85 KG/M2 | HEIGHT: 62 IN

## 2021-07-27 DIAGNOSIS — R56.9 SEIZURE: Primary | ICD-10-CM

## 2021-07-27 DIAGNOSIS — R20.0 RIGHT FACIAL NUMBNESS: ICD-10-CM

## 2021-07-27 PROCEDURE — 3044F HG A1C LEVEL LT 7.0%: CPT | Mod: CPTII,S$GLB,, | Performed by: NEUROLOGICAL SURGERY

## 2021-07-27 PROCEDURE — 1111F PR DISCHARGE MEDS RECONCILED W/ CURRENT OUTPATIENT MED LIST: ICD-10-PCS | Mod: CPTII,S$GLB,, | Performed by: NEUROLOGICAL SURGERY

## 2021-07-27 PROCEDURE — 99999 PR PBB SHADOW E&M-EST. PATIENT-LVL IV: ICD-10-PCS | Mod: PBBFAC,,, | Performed by: NEUROLOGICAL SURGERY

## 2021-07-27 PROCEDURE — 1159F MED LIST DOCD IN RCRD: CPT | Mod: CPTII,S$GLB,, | Performed by: NEUROLOGICAL SURGERY

## 2021-07-27 PROCEDURE — 3044F PR MOST RECENT HEMOGLOBIN A1C LEVEL <7.0%: ICD-10-PCS | Mod: CPTII,S$GLB,, | Performed by: NEUROLOGICAL SURGERY

## 2021-07-27 PROCEDURE — 3079F PR MOST RECENT DIASTOLIC BLOOD PRESSURE 80-89 MM HG: ICD-10-PCS | Mod: CPTII,S$GLB,, | Performed by: NEUROLOGICAL SURGERY

## 2021-07-27 PROCEDURE — 3072F PR LOW RISK FOR RETINOPATHY: ICD-10-PCS | Mod: CPTII,S$GLB,, | Performed by: NEUROLOGICAL SURGERY

## 2021-07-27 PROCEDURE — 1157F PR ADVANCE CARE PLAN OR EQUIV PRESENT IN MEDICAL RECORD: ICD-10-PCS | Mod: CPTII,S$GLB,, | Performed by: NEUROLOGICAL SURGERY

## 2021-07-27 PROCEDURE — 99214 OFFICE O/P EST MOD 30 MIN: CPT | Mod: S$GLB,,, | Performed by: NEUROLOGICAL SURGERY

## 2021-07-27 PROCEDURE — 99999 PR PBB SHADOW E&M-EST. PATIENT-LVL IV: CPT | Mod: PBBFAC,,, | Performed by: NEUROLOGICAL SURGERY

## 2021-07-27 PROCEDURE — 3077F SYST BP >= 140 MM HG: CPT | Mod: CPTII,S$GLB,, | Performed by: NEUROLOGICAL SURGERY

## 2021-07-27 PROCEDURE — 3079F DIAST BP 80-89 MM HG: CPT | Mod: CPTII,S$GLB,, | Performed by: NEUROLOGICAL SURGERY

## 2021-07-27 PROCEDURE — 3288F PR FALLS RISK ASSESSMENT DOCUMENTED: ICD-10-PCS | Mod: CPTII,S$GLB,, | Performed by: NEUROLOGICAL SURGERY

## 2021-07-27 PROCEDURE — 1101F PR PT FALLS ASSESS DOC 0-1 FALLS W/OUT INJ PAST YR: ICD-10-PCS | Mod: CPTII,S$GLB,, | Performed by: NEUROLOGICAL SURGERY

## 2021-07-27 PROCEDURE — 1159F PR MEDICATION LIST DOCUMENTED IN MEDICAL RECORD: ICD-10-PCS | Mod: CPTII,S$GLB,, | Performed by: NEUROLOGICAL SURGERY

## 2021-07-27 PROCEDURE — 1126F PR PAIN SEVERITY QUANTIFIED, NO PAIN PRESENT: ICD-10-PCS | Mod: CPTII,S$GLB,, | Performed by: NEUROLOGICAL SURGERY

## 2021-07-27 PROCEDURE — 1111F DSCHRG MED/CURRENT MED MERGE: CPT | Mod: CPTII,S$GLB,, | Performed by: NEUROLOGICAL SURGERY

## 2021-07-27 PROCEDURE — 1157F ADVNC CARE PLAN IN RCRD: CPT | Mod: CPTII,S$GLB,, | Performed by: NEUROLOGICAL SURGERY

## 2021-07-27 PROCEDURE — 3077F PR MOST RECENT SYSTOLIC BLOOD PRESSURE >= 140 MM HG: ICD-10-PCS | Mod: CPTII,S$GLB,, | Performed by: NEUROLOGICAL SURGERY

## 2021-07-27 PROCEDURE — 3288F FALL RISK ASSESSMENT DOCD: CPT | Mod: CPTII,S$GLB,, | Performed by: NEUROLOGICAL SURGERY

## 2021-07-27 PROCEDURE — 1101F PT FALLS ASSESS-DOCD LE1/YR: CPT | Mod: CPTII,S$GLB,, | Performed by: NEUROLOGICAL SURGERY

## 2021-07-27 PROCEDURE — 99214 PR OFFICE/OUTPT VISIT, EST, LEVL IV, 30-39 MIN: ICD-10-PCS | Mod: S$GLB,,, | Performed by: NEUROLOGICAL SURGERY

## 2021-07-27 PROCEDURE — 1126F AMNT PAIN NOTED NONE PRSNT: CPT | Mod: CPTII,S$GLB,, | Performed by: NEUROLOGICAL SURGERY

## 2021-07-27 PROCEDURE — 3072F LOW RISK FOR RETINOPATHY: CPT | Mod: CPTII,S$GLB,, | Performed by: NEUROLOGICAL SURGERY

## 2021-07-28 ENCOUNTER — PATIENT MESSAGE (OUTPATIENT)
Dept: FAMILY MEDICINE | Facility: CLINIC | Age: 76
End: 2021-07-28

## 2021-07-28 ENCOUNTER — PATIENT MESSAGE (OUTPATIENT)
Dept: NEUROSURGERY | Facility: CLINIC | Age: 76
End: 2021-07-28

## 2021-07-28 ENCOUNTER — TELEPHONE (OUTPATIENT)
Dept: NEUROSURGERY | Facility: CLINIC | Age: 76
End: 2021-07-28

## 2021-07-28 ENCOUNTER — OFFICE VISIT (OUTPATIENT)
Dept: NEUROSURGERY | Facility: CLINIC | Age: 76
End: 2021-07-28
Payer: MEDICARE

## 2021-07-28 DIAGNOSIS — S22.000D OPEN WEDGE FRACTURE OF THORACIC VERTEBRA WITH ROUTINE HEALING, UNSPECIFIED THORACIC VERTEBRAL LEVEL, SUBSEQUENT ENCOUNTER: ICD-10-CM

## 2021-07-28 DIAGNOSIS — S22.041A: ICD-10-CM

## 2021-07-28 DIAGNOSIS — Z98.1 S/P CERVICAL SPINAL FUSION: Primary | ICD-10-CM

## 2021-07-28 DIAGNOSIS — S13.100A SUBLUXATION OF CERVICAL VERTEBRA, INITIAL ENCOUNTER: ICD-10-CM

## 2021-07-28 DIAGNOSIS — R56.9 SEIZURE: ICD-10-CM

## 2021-07-28 PROCEDURE — 1157F PR ADVANCE CARE PLAN OR EQUIV PRESENT IN MEDICAL RECORD: ICD-10-PCS | Mod: CPTII,95,, | Performed by: NEUROLOGICAL SURGERY

## 2021-07-28 PROCEDURE — 1111F PR DISCHARGE MEDS RECONCILED W/ CURRENT OUTPATIENT MED LIST: ICD-10-PCS | Mod: CPTII,95,, | Performed by: NEUROLOGICAL SURGERY

## 2021-07-28 PROCEDURE — 99214 OFFICE O/P EST MOD 30 MIN: CPT | Mod: 95,,, | Performed by: NEUROLOGICAL SURGERY

## 2021-07-28 PROCEDURE — 1157F ADVNC CARE PLAN IN RCRD: CPT | Mod: CPTII,95,, | Performed by: NEUROLOGICAL SURGERY

## 2021-07-28 PROCEDURE — 3072F LOW RISK FOR RETINOPATHY: CPT | Mod: CPTII,95,, | Performed by: NEUROLOGICAL SURGERY

## 2021-07-28 PROCEDURE — 3072F PR LOW RISK FOR RETINOPATHY: ICD-10-PCS | Mod: CPTII,95,, | Performed by: NEUROLOGICAL SURGERY

## 2021-07-28 PROCEDURE — 99214 PR OFFICE/OUTPT VISIT, EST, LEVL IV, 30-39 MIN: ICD-10-PCS | Mod: 95,,, | Performed by: NEUROLOGICAL SURGERY

## 2021-07-28 PROCEDURE — 3044F HG A1C LEVEL LT 7.0%: CPT | Mod: CPTII,95,, | Performed by: NEUROLOGICAL SURGERY

## 2021-07-28 PROCEDURE — 3044F PR MOST RECENT HEMOGLOBIN A1C LEVEL <7.0%: ICD-10-PCS | Mod: CPTII,95,, | Performed by: NEUROLOGICAL SURGERY

## 2021-07-28 PROCEDURE — 1111F DSCHRG MED/CURRENT MED MERGE: CPT | Mod: CPTII,95,, | Performed by: NEUROLOGICAL SURGERY

## 2021-07-30 ENCOUNTER — PATIENT MESSAGE (OUTPATIENT)
Dept: RHEUMATOLOGY | Facility: CLINIC | Age: 76
End: 2021-07-30

## 2021-07-30 ENCOUNTER — PATIENT MESSAGE (OUTPATIENT)
Dept: NEUROLOGY | Facility: CLINIC | Age: 76
End: 2021-07-30

## 2021-08-02 ENCOUNTER — LAB VISIT (OUTPATIENT)
Dept: LAB | Facility: HOSPITAL | Age: 76
End: 2021-08-02
Attending: INTERNAL MEDICINE
Payer: MEDICARE

## 2021-08-02 ENCOUNTER — PATIENT MESSAGE (OUTPATIENT)
Dept: FAMILY MEDICINE | Facility: CLINIC | Age: 76
End: 2021-08-02

## 2021-08-02 DIAGNOSIS — R56.9 SEIZURE: ICD-10-CM

## 2021-08-02 DIAGNOSIS — D63.1 ANEMIA IN CHRONIC KIDNEY DISEASE, UNSPECIFIED CKD STAGE: ICD-10-CM

## 2021-08-02 DIAGNOSIS — N18.9 ANEMIA IN CHRONIC KIDNEY DISEASE, UNSPECIFIED CKD STAGE: ICD-10-CM

## 2021-08-02 LAB
BASOPHILS # BLD AUTO: 0.03 K/UL (ref 0–0.2)
BASOPHILS NFR BLD: 0.7 % (ref 0–1.9)
DIFFERENTIAL METHOD: ABNORMAL
EOSINOPHIL # BLD AUTO: 0.1 K/UL (ref 0–0.5)
EOSINOPHIL NFR BLD: 1.4 % (ref 0–8)
ERYTHROCYTE [DISTWIDTH] IN BLOOD BY AUTOMATED COUNT: 14.3 % (ref 11.5–14.5)
FERRITIN SERPL-MCNC: 194 NG/ML (ref 20–300)
HCT VFR BLD AUTO: 30.8 % (ref 37–48.5)
HGB BLD-MCNC: 10 G/DL (ref 12–16)
IMM GRANULOCYTES # BLD AUTO: 0.04 K/UL (ref 0–0.04)
IMM GRANULOCYTES NFR BLD AUTO: 0.9 % (ref 0–0.5)
IRON SERPL-MCNC: 92 UG/DL (ref 30–160)
LYMPHOCYTES # BLD AUTO: 1 K/UL (ref 1–4.8)
LYMPHOCYTES NFR BLD: 23.2 % (ref 18–48)
MCH RBC QN AUTO: 32.9 PG (ref 27–31)
MCHC RBC AUTO-ENTMCNC: 32.5 G/DL (ref 32–36)
MCV RBC AUTO: 101 FL (ref 82–98)
MONOCYTES # BLD AUTO: 0.4 K/UL (ref 0.3–1)
MONOCYTES NFR BLD: 9.7 % (ref 4–15)
NEUTROPHILS # BLD AUTO: 2.9 K/UL (ref 1.8–7.7)
NEUTROPHILS NFR BLD: 64.1 % (ref 38–73)
NRBC BLD-RTO: 0 /100 WBC
PLATELET # BLD AUTO: 259 K/UL (ref 150–450)
PMV BLD AUTO: 8.5 FL (ref 9.2–12.9)
RBC # BLD AUTO: 3.04 M/UL (ref 4–5.4)
SATURATED IRON: 23 % (ref 20–50)
TOTAL IRON BINDING CAPACITY: 401 UG/DL (ref 250–450)
TRANSFERRIN SERPL-MCNC: 271 MG/DL (ref 200–375)
WBC # BLD AUTO: 4.44 K/UL (ref 3.9–12.7)

## 2021-08-02 PROCEDURE — 82728 ASSAY OF FERRITIN: CPT | Performed by: INTERNAL MEDICINE

## 2021-08-02 PROCEDURE — 83540 ASSAY OF IRON: CPT | Performed by: INTERNAL MEDICINE

## 2021-08-02 PROCEDURE — 36415 COLL VENOUS BLD VENIPUNCTURE: CPT | Performed by: INTERNAL MEDICINE

## 2021-08-02 PROCEDURE — 85025 COMPLETE CBC W/AUTO DIFF WBC: CPT | Performed by: INTERNAL MEDICINE

## 2021-08-03 ENCOUNTER — OFFICE VISIT (OUTPATIENT)
Dept: HEMATOLOGY/ONCOLOGY | Facility: CLINIC | Age: 76
End: 2021-08-03
Payer: MEDICARE

## 2021-08-03 VITALS
SYSTOLIC BLOOD PRESSURE: 164 MMHG | OXYGEN SATURATION: 98 % | HEIGHT: 62 IN | TEMPERATURE: 98 F | DIASTOLIC BLOOD PRESSURE: 94 MMHG | BODY MASS INDEX: 20.73 KG/M2 | HEART RATE: 86 BPM

## 2021-08-03 DIAGNOSIS — N18.9 CHRONIC KIDNEY DISEASE, UNSPECIFIED CKD STAGE: ICD-10-CM

## 2021-08-03 DIAGNOSIS — N18.9 ANEMIA IN CHRONIC KIDNEY DISEASE, UNSPECIFIED CKD STAGE: Primary | ICD-10-CM

## 2021-08-03 DIAGNOSIS — G89.4 CHRONIC PAIN SYNDROME: ICD-10-CM

## 2021-08-03 DIAGNOSIS — D63.1 ANEMIA IN CHRONIC KIDNEY DISEASE, UNSPECIFIED CKD STAGE: Primary | ICD-10-CM

## 2021-08-03 PROCEDURE — 3288F FALL RISK ASSESSMENT DOCD: CPT | Mod: CPTII,S$GLB,, | Performed by: INTERNAL MEDICINE

## 2021-08-03 PROCEDURE — 3072F LOW RISK FOR RETINOPATHY: CPT | Mod: CPTII,S$GLB,, | Performed by: INTERNAL MEDICINE

## 2021-08-03 PROCEDURE — 1125F PR PAIN SEVERITY QUANTIFIED, PAIN PRESENT: ICD-10-PCS | Mod: CPTII,S$GLB,, | Performed by: INTERNAL MEDICINE

## 2021-08-03 PROCEDURE — 99999 PR PBB SHADOW E&M-EST. PATIENT-LVL V: ICD-10-PCS | Mod: PBBFAC,,, | Performed by: INTERNAL MEDICINE

## 2021-08-03 PROCEDURE — 99999 PR PBB SHADOW E&M-EST. PATIENT-LVL V: CPT | Mod: PBBFAC,,, | Performed by: INTERNAL MEDICINE

## 2021-08-03 PROCEDURE — 1101F PR PT FALLS ASSESS DOC 0-1 FALLS W/OUT INJ PAST YR: ICD-10-PCS | Mod: CPTII,S$GLB,, | Performed by: INTERNAL MEDICINE

## 2021-08-03 PROCEDURE — 3080F DIAST BP >= 90 MM HG: CPT | Mod: CPTII,S$GLB,, | Performed by: INTERNAL MEDICINE

## 2021-08-03 PROCEDURE — 3044F HG A1C LEVEL LT 7.0%: CPT | Mod: CPTII,S$GLB,, | Performed by: INTERNAL MEDICINE

## 2021-08-03 PROCEDURE — 3080F PR MOST RECENT DIASTOLIC BLOOD PRESSURE >= 90 MM HG: ICD-10-PCS | Mod: CPTII,S$GLB,, | Performed by: INTERNAL MEDICINE

## 2021-08-03 PROCEDURE — 1111F DSCHRG MED/CURRENT MED MERGE: CPT | Mod: CPTII,S$GLB,, | Performed by: INTERNAL MEDICINE

## 2021-08-03 PROCEDURE — 99214 OFFICE O/P EST MOD 30 MIN: CPT | Mod: S$GLB,,, | Performed by: INTERNAL MEDICINE

## 2021-08-03 PROCEDURE — 1157F ADVNC CARE PLAN IN RCRD: CPT | Mod: CPTII,S$GLB,, | Performed by: INTERNAL MEDICINE

## 2021-08-03 PROCEDURE — 99499 RISK ADDL DX/OHS AUDIT: ICD-10-PCS | Mod: S$GLB,,, | Performed by: INTERNAL MEDICINE

## 2021-08-03 PROCEDURE — 1159F PR MEDICATION LIST DOCUMENTED IN MEDICAL RECORD: ICD-10-PCS | Mod: CPTII,S$GLB,, | Performed by: INTERNAL MEDICINE

## 2021-08-03 PROCEDURE — 3044F PR MOST RECENT HEMOGLOBIN A1C LEVEL <7.0%: ICD-10-PCS | Mod: CPTII,S$GLB,, | Performed by: INTERNAL MEDICINE

## 2021-08-03 PROCEDURE — 99499 UNLISTED E&M SERVICE: CPT | Mod: S$GLB,,, | Performed by: INTERNAL MEDICINE

## 2021-08-03 PROCEDURE — 99214 PR OFFICE/OUTPT VISIT, EST, LEVL IV, 30-39 MIN: ICD-10-PCS | Mod: S$GLB,,, | Performed by: INTERNAL MEDICINE

## 2021-08-03 PROCEDURE — 3077F PR MOST RECENT SYSTOLIC BLOOD PRESSURE >= 140 MM HG: ICD-10-PCS | Mod: CPTII,S$GLB,, | Performed by: INTERNAL MEDICINE

## 2021-08-03 PROCEDURE — 1125F AMNT PAIN NOTED PAIN PRSNT: CPT | Mod: CPTII,S$GLB,, | Performed by: INTERNAL MEDICINE

## 2021-08-03 PROCEDURE — 1157F PR ADVANCE CARE PLAN OR EQUIV PRESENT IN MEDICAL RECORD: ICD-10-PCS | Mod: CPTII,S$GLB,, | Performed by: INTERNAL MEDICINE

## 2021-08-03 PROCEDURE — 3077F SYST BP >= 140 MM HG: CPT | Mod: CPTII,S$GLB,, | Performed by: INTERNAL MEDICINE

## 2021-08-03 PROCEDURE — 1159F MED LIST DOCD IN RCRD: CPT | Mod: CPTII,S$GLB,, | Performed by: INTERNAL MEDICINE

## 2021-08-03 PROCEDURE — 3072F PR LOW RISK FOR RETINOPATHY: ICD-10-PCS | Mod: CPTII,S$GLB,, | Performed by: INTERNAL MEDICINE

## 2021-08-03 PROCEDURE — 3288F PR FALLS RISK ASSESSMENT DOCUMENTED: ICD-10-PCS | Mod: CPTII,S$GLB,, | Performed by: INTERNAL MEDICINE

## 2021-08-03 PROCEDURE — 1111F PR DISCHARGE MEDS RECONCILED W/ CURRENT OUTPATIENT MED LIST: ICD-10-PCS | Mod: CPTII,S$GLB,, | Performed by: INTERNAL MEDICINE

## 2021-08-03 PROCEDURE — 1101F PT FALLS ASSESS-DOCD LE1/YR: CPT | Mod: CPTII,S$GLB,, | Performed by: INTERNAL MEDICINE

## 2021-08-04 ENCOUNTER — NURSE TRIAGE (OUTPATIENT)
Dept: ADMINISTRATIVE | Facility: CLINIC | Age: 76
End: 2021-08-04

## 2021-08-04 ENCOUNTER — HOSPITAL ENCOUNTER (EMERGENCY)
Facility: HOSPITAL | Age: 76
Discharge: HOME | End: 2021-08-04
Attending: EMERGENCY MEDICINE
Payer: MEDICARE

## 2021-08-04 ENCOUNTER — TELEPHONE (OUTPATIENT)
Dept: NEUROLOGY | Facility: CLINIC | Age: 76
End: 2021-08-04

## 2021-08-04 VITALS
BODY MASS INDEX: 20.8 KG/M2 | TEMPERATURE: 99 F | WEIGHT: 113 LBS | RESPIRATION RATE: 18 BRPM | SYSTOLIC BLOOD PRESSURE: 132 MMHG | HEART RATE: 97 BPM | HEIGHT: 62 IN | OXYGEN SATURATION: 96 % | DIASTOLIC BLOOD PRESSURE: 69 MMHG

## 2021-08-04 DIAGNOSIS — R53.1 WEAKNESS: Primary | ICD-10-CM

## 2021-08-04 LAB
ALBUMIN SERPL BCP-MCNC: 4 G/DL (ref 3.5–5.2)
ALP SERPL-CCNC: 50 U/L (ref 55–135)
ALT SERPL W/O P-5'-P-CCNC: 8 U/L (ref 10–44)
ANION GAP SERPL CALC-SCNC: 7 MMOL/L (ref 8–16)
AST SERPL-CCNC: 12 U/L (ref 10–40)
BASOPHILS # BLD AUTO: 0.01 K/UL (ref 0–0.2)
BASOPHILS NFR BLD: 0.2 % (ref 0–1.9)
BILIRUB SERPL-MCNC: 0.5 MG/DL (ref 0.1–1)
BILIRUB UR QL STRIP: NEGATIVE
BUN SERPL-MCNC: 13 MG/DL (ref 8–23)
CALCIUM SERPL-MCNC: 9.8 MG/DL (ref 8.7–10.5)
CHLORIDE SERPL-SCNC: 100 MMOL/L (ref 95–110)
CLARITY UR REFRACT.AUTO: CLEAR
CO2 SERPL-SCNC: 28 MMOL/L (ref 23–29)
COLOR UR AUTO: COLORLESS
CREAT SERPL-MCNC: 1 MG/DL (ref 0.5–1.4)
DIFFERENTIAL METHOD: ABNORMAL
EOSINOPHIL # BLD AUTO: 0 K/UL (ref 0–0.5)
EOSINOPHIL NFR BLD: 0.7 % (ref 0–8)
ERYTHROCYTE [DISTWIDTH] IN BLOOD BY AUTOMATED COUNT: 14.2 % (ref 11.5–14.5)
EST. GFR  (AFRICAN AMERICAN): >60 ML/MIN/1.73 M^2
EST. GFR  (NON AFRICAN AMERICAN): 55.2 ML/MIN/1.73 M^2
GLUCOSE SERPL-MCNC: 106 MG/DL (ref 70–110)
GLUCOSE UR QL STRIP: NEGATIVE
HCT VFR BLD AUTO: 32.5 % (ref 37–48.5)
HGB BLD-MCNC: 10.9 G/DL (ref 12–16)
HGB UR QL STRIP: NEGATIVE
IMM GRANULOCYTES # BLD AUTO: 0.03 K/UL (ref 0–0.04)
IMM GRANULOCYTES NFR BLD AUTO: 0.7 % (ref 0–0.5)
KETONES UR QL STRIP: NEGATIVE
LACTATE SERPL-SCNC: 1.5 MMOL/L (ref 0.5–2.2)
LEUKOCYTE ESTERASE UR QL STRIP: NEGATIVE
LYMPHOCYTES # BLD AUTO: 1.1 K/UL (ref 1–4.8)
LYMPHOCYTES NFR BLD: 24.5 % (ref 18–48)
MCH RBC QN AUTO: 33.7 PG (ref 27–31)
MCHC RBC AUTO-ENTMCNC: 33.5 G/DL (ref 32–36)
MCV RBC AUTO: 101 FL (ref 82–98)
MONOCYTES # BLD AUTO: 0.5 K/UL (ref 0.3–1)
MONOCYTES NFR BLD: 10.8 % (ref 4–15)
NEUTROPHILS # BLD AUTO: 2.8 K/UL (ref 1.8–7.7)
NEUTROPHILS NFR BLD: 63.1 % (ref 38–73)
NITRITE UR QL STRIP: NEGATIVE
NRBC BLD-RTO: 0 /100 WBC
PH UR STRIP: 7 [PH] (ref 5–8)
PLATELET # BLD AUTO: 297 K/UL (ref 150–450)
PMV BLD AUTO: 8.8 FL (ref 9.2–12.9)
POTASSIUM SERPL-SCNC: 3.7 MMOL/L (ref 3.5–5.1)
PROT SERPL-MCNC: 7.5 G/DL (ref 6–8.4)
PROT UR QL STRIP: NEGATIVE
RBC # BLD AUTO: 3.23 M/UL (ref 4–5.4)
SODIUM SERPL-SCNC: 135 MMOL/L (ref 136–145)
SP GR UR STRIP: 1 (ref 1–1.03)
TROPONIN I SERPL DL<=0.01 NG/ML-MCNC: 0.01 NG/ML (ref 0–0.03)
URN SPEC COLLECT METH UR: ABNORMAL
WBC # BLD AUTO: 4.37 K/UL (ref 3.9–12.7)

## 2021-08-04 PROCEDURE — 87040 BLOOD CULTURE FOR BACTERIA: CPT | Performed by: EMERGENCY MEDICINE

## 2021-08-04 PROCEDURE — 83605 ASSAY OF LACTIC ACID: CPT | Performed by: EMERGENCY MEDICINE

## 2021-08-04 PROCEDURE — 86803 HEPATITIS C AB TEST: CPT | Performed by: EMERGENCY MEDICINE

## 2021-08-04 PROCEDURE — 84484 ASSAY OF TROPONIN QUANT: CPT | Performed by: EMERGENCY MEDICINE

## 2021-08-04 PROCEDURE — 81003 URINALYSIS AUTO W/O SCOPE: CPT | Performed by: EMERGENCY MEDICINE

## 2021-08-04 PROCEDURE — 87389 HIV-1 AG W/HIV-1&-2 AB AG IA: CPT | Performed by: EMERGENCY MEDICINE

## 2021-08-04 PROCEDURE — 85025 COMPLETE CBC W/AUTO DIFF WBC: CPT | Performed by: EMERGENCY MEDICINE

## 2021-08-04 PROCEDURE — 99285 EMERGENCY DEPT VISIT HI MDM: CPT

## 2021-08-04 PROCEDURE — 93010 EKG 12-LEAD: ICD-10-PCS | Mod: ,,, | Performed by: INTERNAL MEDICINE

## 2021-08-04 PROCEDURE — 80053 COMPREHEN METABOLIC PANEL: CPT | Performed by: EMERGENCY MEDICINE

## 2021-08-04 PROCEDURE — 99285 EMERGENCY DEPT VISIT HI MDM: CPT | Mod: ,,, | Performed by: EMERGENCY MEDICINE

## 2021-08-04 PROCEDURE — 93010 ELECTROCARDIOGRAM REPORT: CPT | Mod: ,,, | Performed by: INTERNAL MEDICINE

## 2021-08-04 PROCEDURE — 99285 PR EMERGENCY DEPT VISIT,LEVEL V: ICD-10-PCS | Mod: ,,, | Performed by: EMERGENCY MEDICINE

## 2021-08-04 PROCEDURE — 93005 ELECTROCARDIOGRAM TRACING: CPT

## 2021-08-05 ENCOUNTER — PATIENT MESSAGE (OUTPATIENT)
Dept: NEUROLOGY | Facility: CLINIC | Age: 76
End: 2021-08-05

## 2021-08-05 ENCOUNTER — OFFICE VISIT (OUTPATIENT)
Dept: PAIN MEDICINE | Facility: CLINIC | Age: 76
End: 2021-08-05
Payer: MEDICARE

## 2021-08-05 VITALS
RESPIRATION RATE: 18 BRPM | HEIGHT: 62 IN | SYSTOLIC BLOOD PRESSURE: 150 MMHG | TEMPERATURE: 98 F | DIASTOLIC BLOOD PRESSURE: 89 MMHG | BODY MASS INDEX: 20.8 KG/M2 | HEART RATE: 99 BPM | WEIGHT: 113 LBS

## 2021-08-05 DIAGNOSIS — G89.4 CHRONIC PAIN DISORDER: Primary | ICD-10-CM

## 2021-08-05 DIAGNOSIS — M54.16 LUMBAR RADICULOPATHY: ICD-10-CM

## 2021-08-05 DIAGNOSIS — M51.36 DDD (DEGENERATIVE DISC DISEASE), LUMBAR: ICD-10-CM

## 2021-08-05 DIAGNOSIS — M53.3 SACROILIAC JOINT PAIN: ICD-10-CM

## 2021-08-05 DIAGNOSIS — M47.816 LUMBAR SPONDYLOSIS: ICD-10-CM

## 2021-08-05 DIAGNOSIS — S22.009S CLOSED FRACTURE DISLOCATION OF THORACIC SPINE, SEQUELA: ICD-10-CM

## 2021-08-05 DIAGNOSIS — M16.0 PRIMARY OSTEOARTHRITIS OF BOTH HIPS: ICD-10-CM

## 2021-08-05 LAB
HCV AB SERPL QL IA: NEGATIVE
HIV 1+2 AB+HIV1 P24 AG SERPL QL IA: NEGATIVE

## 2021-08-05 PROCEDURE — 99999 PR PBB SHADOW E&M-EST. PATIENT-LVL V: ICD-10-PCS | Mod: PBBFAC,,, | Performed by: NURSE PRACTITIONER

## 2021-08-05 PROCEDURE — 99213 OFFICE O/P EST LOW 20 MIN: CPT | Mod: S$GLB,,, | Performed by: NURSE PRACTITIONER

## 2021-08-05 PROCEDURE — 1100F PTFALLS ASSESS-DOCD GE2>/YR: CPT | Mod: CPTII,S$GLB,, | Performed by: NURSE PRACTITIONER

## 2021-08-05 PROCEDURE — 3044F HG A1C LEVEL LT 7.0%: CPT | Mod: CPTII,S$GLB,, | Performed by: NURSE PRACTITIONER

## 2021-08-05 PROCEDURE — 1157F PR ADVANCE CARE PLAN OR EQUIV PRESENT IN MEDICAL RECORD: ICD-10-PCS | Mod: CPTII,S$GLB,, | Performed by: NURSE PRACTITIONER

## 2021-08-05 PROCEDURE — 1111F PR DISCHARGE MEDS RECONCILED W/ CURRENT OUTPATIENT MED LIST: ICD-10-PCS | Mod: CPTII,S$GLB,, | Performed by: NURSE PRACTITIONER

## 2021-08-05 PROCEDURE — 1159F MED LIST DOCD IN RCRD: CPT | Mod: CPTII,S$GLB,, | Performed by: NURSE PRACTITIONER

## 2021-08-05 PROCEDURE — 1160F PR REVIEW ALL MEDS BY PRESCRIBER/CLIN PHARMACIST DOCUMENTED: ICD-10-PCS | Mod: CPTII,S$GLB,, | Performed by: NURSE PRACTITIONER

## 2021-08-05 PROCEDURE — 3077F SYST BP >= 140 MM HG: CPT | Mod: CPTII,S$GLB,, | Performed by: NURSE PRACTITIONER

## 2021-08-05 PROCEDURE — 1100F PR PT FALLS ASSESS DOC 2+ FALLS/FALL W/INJURY/YR: ICD-10-PCS | Mod: CPTII,S$GLB,, | Performed by: NURSE PRACTITIONER

## 2021-08-05 PROCEDURE — 1157F ADVNC CARE PLAN IN RCRD: CPT | Mod: CPTII,S$GLB,, | Performed by: NURSE PRACTITIONER

## 2021-08-05 PROCEDURE — 99213 PR OFFICE/OUTPT VISIT, EST, LEVL III, 20-29 MIN: ICD-10-PCS | Mod: S$GLB,,, | Performed by: NURSE PRACTITIONER

## 2021-08-05 PROCEDURE — 3072F PR LOW RISK FOR RETINOPATHY: ICD-10-PCS | Mod: CPTII,S$GLB,, | Performed by: NURSE PRACTITIONER

## 2021-08-05 PROCEDURE — 99999 PR PBB SHADOW E&M-EST. PATIENT-LVL V: CPT | Mod: PBBFAC,,, | Performed by: NURSE PRACTITIONER

## 2021-08-05 PROCEDURE — 1159F PR MEDICATION LIST DOCUMENTED IN MEDICAL RECORD: ICD-10-PCS | Mod: CPTII,S$GLB,, | Performed by: NURSE PRACTITIONER

## 2021-08-05 PROCEDURE — 3079F DIAST BP 80-89 MM HG: CPT | Mod: CPTII,S$GLB,, | Performed by: NURSE PRACTITIONER

## 2021-08-05 PROCEDURE — 1160F RVW MEDS BY RX/DR IN RCRD: CPT | Mod: CPTII,S$GLB,, | Performed by: NURSE PRACTITIONER

## 2021-08-05 PROCEDURE — 3288F PR FALLS RISK ASSESSMENT DOCUMENTED: ICD-10-PCS | Mod: CPTII,S$GLB,, | Performed by: NURSE PRACTITIONER

## 2021-08-05 PROCEDURE — 3072F LOW RISK FOR RETINOPATHY: CPT | Mod: CPTII,S$GLB,, | Performed by: NURSE PRACTITIONER

## 2021-08-05 PROCEDURE — 3288F FALL RISK ASSESSMENT DOCD: CPT | Mod: CPTII,S$GLB,, | Performed by: NURSE PRACTITIONER

## 2021-08-05 PROCEDURE — 3079F PR MOST RECENT DIASTOLIC BLOOD PRESSURE 80-89 MM HG: ICD-10-PCS | Mod: CPTII,S$GLB,, | Performed by: NURSE PRACTITIONER

## 2021-08-05 PROCEDURE — 1125F PR PAIN SEVERITY QUANTIFIED, PAIN PRESENT: ICD-10-PCS | Mod: CPTII,S$GLB,, | Performed by: NURSE PRACTITIONER

## 2021-08-05 PROCEDURE — 3077F PR MOST RECENT SYSTOLIC BLOOD PRESSURE >= 140 MM HG: ICD-10-PCS | Mod: CPTII,S$GLB,, | Performed by: NURSE PRACTITIONER

## 2021-08-05 PROCEDURE — 3044F PR MOST RECENT HEMOGLOBIN A1C LEVEL <7.0%: ICD-10-PCS | Mod: CPTII,S$GLB,, | Performed by: NURSE PRACTITIONER

## 2021-08-05 PROCEDURE — 1111F DSCHRG MED/CURRENT MED MERGE: CPT | Mod: CPTII,S$GLB,, | Performed by: NURSE PRACTITIONER

## 2021-08-05 PROCEDURE — 1125F AMNT PAIN NOTED PAIN PRSNT: CPT | Mod: CPTII,S$GLB,, | Performed by: NURSE PRACTITIONER

## 2021-08-06 ENCOUNTER — PATIENT MESSAGE (OUTPATIENT)
Dept: NEUROLOGY | Facility: CLINIC | Age: 76
End: 2021-08-06

## 2021-08-06 ENCOUNTER — INFUSION (OUTPATIENT)
Dept: INFUSION THERAPY | Facility: HOSPITAL | Age: 76
End: 2021-08-06
Attending: INTERNAL MEDICINE
Payer: MEDICARE

## 2021-08-06 VITALS
RESPIRATION RATE: 18 BRPM | DIASTOLIC BLOOD PRESSURE: 96 MMHG | SYSTOLIC BLOOD PRESSURE: 191 MMHG | OXYGEN SATURATION: 97 % | HEART RATE: 102 BPM

## 2021-08-09 ENCOUNTER — OFFICE VISIT (OUTPATIENT)
Dept: RHEUMATOLOGY | Facility: CLINIC | Age: 76
End: 2021-08-09
Payer: MEDICARE

## 2021-08-09 ENCOUNTER — PATIENT OUTREACH (OUTPATIENT)
Dept: ADMINISTRATIVE | Facility: OTHER | Age: 76
End: 2021-08-09

## 2021-08-09 ENCOUNTER — LAB VISIT (OUTPATIENT)
Dept: LAB | Facility: HOSPITAL | Age: 76
End: 2021-08-09
Attending: INTERNAL MEDICINE
Payer: MEDICARE

## 2021-08-09 VITALS
SYSTOLIC BLOOD PRESSURE: 179 MMHG | BODY MASS INDEX: 22.15 KG/M2 | HEART RATE: 101 BPM | DIASTOLIC BLOOD PRESSURE: 94 MMHG | WEIGHT: 120.38 LBS | HEIGHT: 62 IN

## 2021-08-09 DIAGNOSIS — D86.86 SARCOID ARTHROPATHY: ICD-10-CM

## 2021-08-09 DIAGNOSIS — R56.9 SEIZURE: Primary | ICD-10-CM

## 2021-08-09 DIAGNOSIS — R20.0 RIGHT FACIAL NUMBNESS: ICD-10-CM

## 2021-08-09 DIAGNOSIS — G72.9 MYOPATHY: ICD-10-CM

## 2021-08-09 DIAGNOSIS — D86.9 SARCOIDOSIS: Primary | ICD-10-CM

## 2021-08-09 DIAGNOSIS — D84.9 IMMUNOSUPPRESSION: ICD-10-CM

## 2021-08-09 DIAGNOSIS — R53.83 FATIGUE, UNSPECIFIED TYPE: ICD-10-CM

## 2021-08-09 DIAGNOSIS — D86.9 SARCOIDOSIS: ICD-10-CM

## 2021-08-09 LAB
BACTERIA BLD CULT: NORMAL
BACTERIA BLD CULT: NORMAL
CK SERPL-CCNC: 140 U/L (ref 20–180)
CRP SERPL-MCNC: 1.5 MG/L (ref 0–8.2)
ERYTHROCYTE [SEDIMENTATION RATE] IN BLOOD BY WESTERGREN METHOD: 5 MM/HR (ref 0–36)

## 2021-08-09 PROCEDURE — 86140 C-REACTIVE PROTEIN: CPT | Performed by: INTERNAL MEDICINE

## 2021-08-09 PROCEDURE — 1159F PR MEDICATION LIST DOCUMENTED IN MEDICAL RECORD: ICD-10-PCS | Mod: CPTII,S$GLB,, | Performed by: INTERNAL MEDICINE

## 2021-08-09 PROCEDURE — 3044F HG A1C LEVEL LT 7.0%: CPT | Mod: CPTII,S$GLB,, | Performed by: INTERNAL MEDICINE

## 2021-08-09 PROCEDURE — 1125F AMNT PAIN NOTED PAIN PRSNT: CPT | Mod: CPTII,S$GLB,, | Performed by: INTERNAL MEDICINE

## 2021-08-09 PROCEDURE — 1111F PR DISCHARGE MEDS RECONCILED W/ CURRENT OUTPATIENT MED LIST: ICD-10-PCS | Mod: CPTII,S$GLB,, | Performed by: INTERNAL MEDICINE

## 2021-08-09 PROCEDURE — 99999 PR PBB SHADOW E&M-EST. PATIENT-LVL IV: CPT | Mod: PBBFAC,,, | Performed by: INTERNAL MEDICINE

## 2021-08-09 PROCEDURE — 85652 RBC SED RATE AUTOMATED: CPT | Performed by: INTERNAL MEDICINE

## 2021-08-09 PROCEDURE — 3288F FALL RISK ASSESSMENT DOCD: CPT | Mod: CPTII,S$GLB,, | Performed by: INTERNAL MEDICINE

## 2021-08-09 PROCEDURE — 3080F PR MOST RECENT DIASTOLIC BLOOD PRESSURE >= 90 MM HG: ICD-10-PCS | Mod: CPTII,S$GLB,, | Performed by: INTERNAL MEDICINE

## 2021-08-09 PROCEDURE — 99999 PR PBB SHADOW E&M-EST. PATIENT-LVL IV: ICD-10-PCS | Mod: PBBFAC,,, | Performed by: INTERNAL MEDICINE

## 2021-08-09 PROCEDURE — 1101F PR PT FALLS ASSESS DOC 0-1 FALLS W/OUT INJ PAST YR: ICD-10-PCS | Mod: CPTII,S$GLB,, | Performed by: INTERNAL MEDICINE

## 2021-08-09 PROCEDURE — 1160F PR REVIEW ALL MEDS BY PRESCRIBER/CLIN PHARMACIST DOCUMENTED: ICD-10-PCS | Mod: CPTII,S$GLB,, | Performed by: INTERNAL MEDICINE

## 2021-08-09 PROCEDURE — 1101F PT FALLS ASSESS-DOCD LE1/YR: CPT | Mod: CPTII,S$GLB,, | Performed by: INTERNAL MEDICINE

## 2021-08-09 PROCEDURE — 1111F DSCHRG MED/CURRENT MED MERGE: CPT | Mod: CPTII,S$GLB,, | Performed by: INTERNAL MEDICINE

## 2021-08-09 PROCEDURE — 1159F MED LIST DOCD IN RCRD: CPT | Mod: CPTII,S$GLB,, | Performed by: INTERNAL MEDICINE

## 2021-08-09 PROCEDURE — 3080F DIAST BP >= 90 MM HG: CPT | Mod: CPTII,S$GLB,, | Performed by: INTERNAL MEDICINE

## 2021-08-09 PROCEDURE — 3072F PR LOW RISK FOR RETINOPATHY: ICD-10-PCS | Mod: CPTII,S$GLB,, | Performed by: INTERNAL MEDICINE

## 2021-08-09 PROCEDURE — 3077F PR MOST RECENT SYSTOLIC BLOOD PRESSURE >= 140 MM HG: ICD-10-PCS | Mod: CPTII,S$GLB,, | Performed by: INTERNAL MEDICINE

## 2021-08-09 PROCEDURE — 99214 PR OFFICE/OUTPT VISIT, EST, LEVL IV, 30-39 MIN: ICD-10-PCS | Mod: S$GLB,,, | Performed by: INTERNAL MEDICINE

## 2021-08-09 PROCEDURE — 1125F PR PAIN SEVERITY QUANTIFIED, PAIN PRESENT: ICD-10-PCS | Mod: CPTII,S$GLB,, | Performed by: INTERNAL MEDICINE

## 2021-08-09 PROCEDURE — 82550 ASSAY OF CK (CPK): CPT | Performed by: INTERNAL MEDICINE

## 2021-08-09 PROCEDURE — 1157F PR ADVANCE CARE PLAN OR EQUIV PRESENT IN MEDICAL RECORD: ICD-10-PCS | Mod: CPTII,S$GLB,, | Performed by: INTERNAL MEDICINE

## 2021-08-09 PROCEDURE — 3072F LOW RISK FOR RETINOPATHY: CPT | Mod: CPTII,S$GLB,, | Performed by: INTERNAL MEDICINE

## 2021-08-09 PROCEDURE — 36415 COLL VENOUS BLD VENIPUNCTURE: CPT | Performed by: INTERNAL MEDICINE

## 2021-08-09 PROCEDURE — 3288F PR FALLS RISK ASSESSMENT DOCUMENTED: ICD-10-PCS | Mod: CPTII,S$GLB,, | Performed by: INTERNAL MEDICINE

## 2021-08-09 PROCEDURE — 3044F PR MOST RECENT HEMOGLOBIN A1C LEVEL <7.0%: ICD-10-PCS | Mod: CPTII,S$GLB,, | Performed by: INTERNAL MEDICINE

## 2021-08-09 PROCEDURE — 3077F SYST BP >= 140 MM HG: CPT | Mod: CPTII,S$GLB,, | Performed by: INTERNAL MEDICINE

## 2021-08-09 PROCEDURE — 99214 OFFICE O/P EST MOD 30 MIN: CPT | Mod: S$GLB,,, | Performed by: INTERNAL MEDICINE

## 2021-08-09 PROCEDURE — 1157F ADVNC CARE PLAN IN RCRD: CPT | Mod: CPTII,S$GLB,, | Performed by: INTERNAL MEDICINE

## 2021-08-09 PROCEDURE — 1160F RVW MEDS BY RX/DR IN RCRD: CPT | Mod: CPTII,S$GLB,, | Performed by: INTERNAL MEDICINE

## 2021-08-09 RX ORDER — APIXABAN 5 MG (74)
KIT ORAL
Qty: 74 TABLET | Refills: 0 | Status: CANCELLED | OUTPATIENT
Start: 2021-08-05

## 2021-08-09 RX ORDER — LEVETIRACETAM 250 MG/1
250 TABLET ORAL 2 TIMES DAILY
Qty: 60 TABLET | Refills: 11 | Status: SHIPPED | OUTPATIENT
Start: 2021-08-09 | End: 2022-07-25 | Stop reason: SDUPTHER

## 2021-08-09 RX ORDER — PREDNISONE 20 MG/1
40 TABLET ORAL DAILY
Qty: 6 TABLET | Refills: 0 | Status: SHIPPED | OUTPATIENT
Start: 2021-08-09 | End: 2021-08-12

## 2021-08-10 ENCOUNTER — PATIENT MESSAGE (OUTPATIENT)
Dept: RHEUMATOLOGY | Facility: CLINIC | Age: 76
End: 2021-08-10

## 2021-08-12 ENCOUNTER — TELEPHONE (OUTPATIENT)
Dept: FAMILY MEDICINE | Facility: CLINIC | Age: 76
End: 2021-08-12

## 2021-08-13 ENCOUNTER — PATIENT MESSAGE (OUTPATIENT)
Dept: PAIN MEDICINE | Facility: CLINIC | Age: 76
End: 2021-08-13

## 2021-08-16 ENCOUNTER — TELEPHONE (OUTPATIENT)
Dept: PAIN MEDICINE | Facility: CLINIC | Age: 76
End: 2021-08-16

## 2021-08-17 ENCOUNTER — TELEPHONE (OUTPATIENT)
Dept: ORTHOPEDICS | Facility: CLINIC | Age: 76
End: 2021-08-17

## 2021-08-17 ENCOUNTER — OFFICE VISIT (OUTPATIENT)
Dept: PAIN MEDICINE | Facility: CLINIC | Age: 76
End: 2021-08-17
Payer: MEDICARE

## 2021-08-17 VITALS
TEMPERATURE: 98 F | HEIGHT: 62 IN | HEART RATE: 107 BPM | WEIGHT: 114.44 LBS | SYSTOLIC BLOOD PRESSURE: 137 MMHG | BODY MASS INDEX: 21.06 KG/M2 | RESPIRATION RATE: 18 BRPM | DIASTOLIC BLOOD PRESSURE: 75 MMHG

## 2021-08-17 DIAGNOSIS — G89.4 CHRONIC PAIN DISORDER: Primary | ICD-10-CM

## 2021-08-17 DIAGNOSIS — M47.816 LUMBAR SPONDYLOSIS: ICD-10-CM

## 2021-08-17 DIAGNOSIS — M79.641 BILATERAL HAND PAIN: ICD-10-CM

## 2021-08-17 DIAGNOSIS — M79.642 BILATERAL HAND PAIN: ICD-10-CM

## 2021-08-17 DIAGNOSIS — M53.3 SACROILIAC JOINT PAIN: ICD-10-CM

## 2021-08-17 DIAGNOSIS — M79.642 BILATERAL HAND PAIN: Primary | ICD-10-CM

## 2021-08-17 DIAGNOSIS — M51.36 DDD (DEGENERATIVE DISC DISEASE), LUMBAR: ICD-10-CM

## 2021-08-17 DIAGNOSIS — M79.641 BILATERAL HAND PAIN: Primary | ICD-10-CM

## 2021-08-17 PROCEDURE — 1125F PR PAIN SEVERITY QUANTIFIED, PAIN PRESENT: ICD-10-PCS | Mod: CPTII,S$GLB,, | Performed by: NURSE PRACTITIONER

## 2021-08-17 PROCEDURE — 3072F LOW RISK FOR RETINOPATHY: CPT | Mod: CPTII,S$GLB,, | Performed by: NURSE PRACTITIONER

## 2021-08-17 PROCEDURE — 3078F DIAST BP <80 MM HG: CPT | Mod: CPTII,S$GLB,, | Performed by: NURSE PRACTITIONER

## 2021-08-17 PROCEDURE — 1125F AMNT PAIN NOTED PAIN PRSNT: CPT | Mod: CPTII,S$GLB,, | Performed by: NURSE PRACTITIONER

## 2021-08-17 PROCEDURE — 3078F PR MOST RECENT DIASTOLIC BLOOD PRESSURE < 80 MM HG: ICD-10-PCS | Mod: CPTII,S$GLB,, | Performed by: NURSE PRACTITIONER

## 2021-08-17 PROCEDURE — 99213 PR OFFICE/OUTPT VISIT, EST, LEVL III, 20-29 MIN: ICD-10-PCS | Mod: S$GLB,,, | Performed by: NURSE PRACTITIONER

## 2021-08-17 PROCEDURE — 3044F HG A1C LEVEL LT 7.0%: CPT | Mod: CPTII,S$GLB,, | Performed by: NURSE PRACTITIONER

## 2021-08-17 PROCEDURE — 1160F PR REVIEW ALL MEDS BY PRESCRIBER/CLIN PHARMACIST DOCUMENTED: ICD-10-PCS | Mod: CPTII,S$GLB,, | Performed by: NURSE PRACTITIONER

## 2021-08-17 PROCEDURE — 3044F PR MOST RECENT HEMOGLOBIN A1C LEVEL <7.0%: ICD-10-PCS | Mod: CPTII,S$GLB,, | Performed by: NURSE PRACTITIONER

## 2021-08-17 PROCEDURE — 99999 PR PBB SHADOW E&M-EST. PATIENT-LVL V: ICD-10-PCS | Mod: PBBFAC,,, | Performed by: NURSE PRACTITIONER

## 2021-08-17 PROCEDURE — 99213 OFFICE O/P EST LOW 20 MIN: CPT | Mod: S$GLB,,, | Performed by: NURSE PRACTITIONER

## 2021-08-17 PROCEDURE — 1160F RVW MEDS BY RX/DR IN RCRD: CPT | Mod: CPTII,S$GLB,, | Performed by: NURSE PRACTITIONER

## 2021-08-17 PROCEDURE — 3075F PR MOST RECENT SYSTOLIC BLOOD PRESS GE 130-139MM HG: ICD-10-PCS | Mod: CPTII,S$GLB,, | Performed by: NURSE PRACTITIONER

## 2021-08-17 PROCEDURE — 1157F ADVNC CARE PLAN IN RCRD: CPT | Mod: CPTII,S$GLB,, | Performed by: NURSE PRACTITIONER

## 2021-08-17 PROCEDURE — 3288F PR FALLS RISK ASSESSMENT DOCUMENTED: ICD-10-PCS | Mod: CPTII,S$GLB,, | Performed by: NURSE PRACTITIONER

## 2021-08-17 PROCEDURE — 3075F SYST BP GE 130 - 139MM HG: CPT | Mod: CPTII,S$GLB,, | Performed by: NURSE PRACTITIONER

## 2021-08-17 PROCEDURE — 1100F PR PT FALLS ASSESS DOC 2+ FALLS/FALL W/INJURY/YR: ICD-10-PCS | Mod: CPTII,S$GLB,, | Performed by: NURSE PRACTITIONER

## 2021-08-17 PROCEDURE — 3288F FALL RISK ASSESSMENT DOCD: CPT | Mod: CPTII,S$GLB,, | Performed by: NURSE PRACTITIONER

## 2021-08-17 PROCEDURE — 1100F PTFALLS ASSESS-DOCD GE2>/YR: CPT | Mod: CPTII,S$GLB,, | Performed by: NURSE PRACTITIONER

## 2021-08-17 PROCEDURE — 1159F PR MEDICATION LIST DOCUMENTED IN MEDICAL RECORD: ICD-10-PCS | Mod: CPTII,S$GLB,, | Performed by: NURSE PRACTITIONER

## 2021-08-17 PROCEDURE — 99999 PR PBB SHADOW E&M-EST. PATIENT-LVL V: CPT | Mod: PBBFAC,,, | Performed by: NURSE PRACTITIONER

## 2021-08-17 PROCEDURE — 1157F PR ADVANCE CARE PLAN OR EQUIV PRESENT IN MEDICAL RECORD: ICD-10-PCS | Mod: CPTII,S$GLB,, | Performed by: NURSE PRACTITIONER

## 2021-08-17 PROCEDURE — 3072F PR LOW RISK FOR RETINOPATHY: ICD-10-PCS | Mod: CPTII,S$GLB,, | Performed by: NURSE PRACTITIONER

## 2021-08-17 PROCEDURE — 1159F MED LIST DOCD IN RCRD: CPT | Mod: CPTII,S$GLB,, | Performed by: NURSE PRACTITIONER

## 2021-08-18 ENCOUNTER — HOSPITAL ENCOUNTER (OUTPATIENT)
Dept: RADIOLOGY | Facility: OTHER | Age: 76
Discharge: HOME | End: 2021-08-18
Attending: ORTHOPAEDIC SURGERY
Payer: MEDICARE

## 2021-08-18 ENCOUNTER — TELEPHONE (OUTPATIENT)
Dept: ORTHOPEDICS | Facility: CLINIC | Age: 76
End: 2021-08-18

## 2021-08-18 ENCOUNTER — TELEPHONE (OUTPATIENT)
Dept: FAMILY MEDICINE | Facility: CLINIC | Age: 76
End: 2021-08-18

## 2021-08-18 ENCOUNTER — OFFICE VISIT (OUTPATIENT)
Dept: ORTHOPEDICS | Facility: CLINIC | Age: 76
End: 2021-08-18
Payer: MEDICARE

## 2021-08-18 VITALS
BODY MASS INDEX: 20.98 KG/M2 | WEIGHT: 114 LBS | HEART RATE: 112 BPM | DIASTOLIC BLOOD PRESSURE: 95 MMHG | SYSTOLIC BLOOD PRESSURE: 183 MMHG | HEIGHT: 62 IN

## 2021-08-18 DIAGNOSIS — M79.641 BILATERAL HAND PAIN: ICD-10-CM

## 2021-08-18 DIAGNOSIS — M79.642 BILATERAL HAND PAIN: ICD-10-CM

## 2021-08-18 DIAGNOSIS — G56.03 BILATERAL CARPAL TUNNEL SYNDROME: Primary | ICD-10-CM

## 2021-08-18 PROCEDURE — 1101F PR PT FALLS ASSESS DOC 0-1 FALLS W/OUT INJ PAST YR: ICD-10-PCS | Mod: CPTII,S$GLB,, | Performed by: ORTHOPAEDIC SURGERY

## 2021-08-18 PROCEDURE — 73130 X-RAY EXAM OF HAND: CPT | Mod: TC,50,FY

## 2021-08-18 PROCEDURE — 73130 X-RAY EXAM OF HAND: CPT | Mod: 26,50,, | Performed by: RADIOLOGY

## 2021-08-18 PROCEDURE — 3077F SYST BP >= 140 MM HG: CPT | Mod: CPTII,S$GLB,, | Performed by: ORTHOPAEDIC SURGERY

## 2021-08-18 PROCEDURE — 99999 PR PBB SHADOW E&M-EST. PATIENT-LVL IV: ICD-10-PCS | Mod: PBBFAC,,, | Performed by: ORTHOPAEDIC SURGERY

## 2021-08-18 PROCEDURE — 3080F PR MOST RECENT DIASTOLIC BLOOD PRESSURE >= 90 MM HG: ICD-10-PCS | Mod: CPTII,S$GLB,, | Performed by: ORTHOPAEDIC SURGERY

## 2021-08-18 PROCEDURE — 99204 PR OFFICE/OUTPT VISIT, NEW, LEVL IV, 45-59 MIN: ICD-10-PCS | Mod: S$GLB,,, | Performed by: ORTHOPAEDIC SURGERY

## 2021-08-18 PROCEDURE — 1157F ADVNC CARE PLAN IN RCRD: CPT | Mod: CPTII,S$GLB,, | Performed by: ORTHOPAEDIC SURGERY

## 2021-08-18 PROCEDURE — 1157F PR ADVANCE CARE PLAN OR EQUIV PRESENT IN MEDICAL RECORD: ICD-10-PCS | Mod: CPTII,S$GLB,, | Performed by: ORTHOPAEDIC SURGERY

## 2021-08-18 PROCEDURE — 99204 OFFICE O/P NEW MOD 45 MIN: CPT | Mod: S$GLB,,, | Performed by: ORTHOPAEDIC SURGERY

## 2021-08-18 PROCEDURE — 3072F PR LOW RISK FOR RETINOPATHY: ICD-10-PCS | Mod: CPTII,S$GLB,, | Performed by: ORTHOPAEDIC SURGERY

## 2021-08-18 PROCEDURE — 73130 XR HAND COMPLETE 3 VIEWS BILATERAL: ICD-10-PCS | Mod: 26,50,, | Performed by: RADIOLOGY

## 2021-08-18 PROCEDURE — 3044F HG A1C LEVEL LT 7.0%: CPT | Mod: CPTII,S$GLB,, | Performed by: ORTHOPAEDIC SURGERY

## 2021-08-18 PROCEDURE — 3072F LOW RISK FOR RETINOPATHY: CPT | Mod: CPTII,S$GLB,, | Performed by: ORTHOPAEDIC SURGERY

## 2021-08-18 PROCEDURE — 3077F PR MOST RECENT SYSTOLIC BLOOD PRESSURE >= 140 MM HG: ICD-10-PCS | Mod: CPTII,S$GLB,, | Performed by: ORTHOPAEDIC SURGERY

## 2021-08-18 PROCEDURE — 3288F FALL RISK ASSESSMENT DOCD: CPT | Mod: CPTII,S$GLB,, | Performed by: ORTHOPAEDIC SURGERY

## 2021-08-18 PROCEDURE — 1125F AMNT PAIN NOTED PAIN PRSNT: CPT | Mod: CPTII,S$GLB,, | Performed by: ORTHOPAEDIC SURGERY

## 2021-08-18 PROCEDURE — 1125F PR PAIN SEVERITY QUANTIFIED, PAIN PRESENT: ICD-10-PCS | Mod: CPTII,S$GLB,, | Performed by: ORTHOPAEDIC SURGERY

## 2021-08-18 PROCEDURE — 3044F PR MOST RECENT HEMOGLOBIN A1C LEVEL <7.0%: ICD-10-PCS | Mod: CPTII,S$GLB,, | Performed by: ORTHOPAEDIC SURGERY

## 2021-08-18 PROCEDURE — 99999 PR PBB SHADOW E&M-EST. PATIENT-LVL IV: CPT | Mod: PBBFAC,,, | Performed by: ORTHOPAEDIC SURGERY

## 2021-08-18 PROCEDURE — 3288F PR FALLS RISK ASSESSMENT DOCUMENTED: ICD-10-PCS | Mod: CPTII,S$GLB,, | Performed by: ORTHOPAEDIC SURGERY

## 2021-08-18 PROCEDURE — 1101F PT FALLS ASSESS-DOCD LE1/YR: CPT | Mod: CPTII,S$GLB,, | Performed by: ORTHOPAEDIC SURGERY

## 2021-08-18 PROCEDURE — 3080F DIAST BP >= 90 MM HG: CPT | Mod: CPTII,S$GLB,, | Performed by: ORTHOPAEDIC SURGERY

## 2021-08-19 ENCOUNTER — TELEPHONE (OUTPATIENT)
Dept: PAIN MEDICINE | Facility: CLINIC | Age: 76
End: 2021-08-19

## 2021-08-20 ENCOUNTER — INFUSION (OUTPATIENT)
Dept: INFUSION THERAPY | Facility: HOSPITAL | Age: 76
End: 2021-08-20
Attending: INTERNAL MEDICINE
Payer: MEDICARE

## 2021-08-20 VITALS
DIASTOLIC BLOOD PRESSURE: 90 MMHG | SYSTOLIC BLOOD PRESSURE: 154 MMHG | OXYGEN SATURATION: 97 % | HEART RATE: 90 BPM | RESPIRATION RATE: 16 BRPM

## 2021-08-20 DIAGNOSIS — N18.9 ANEMIA IN CHRONIC KIDNEY DISEASE, UNSPECIFIED CKD STAGE: Primary | ICD-10-CM

## 2021-08-20 DIAGNOSIS — Z53.1 REFUSAL OF BLOOD TRANSFUSIONS AS PATIENT IS JEHOVAH'S WITNESS: ICD-10-CM

## 2021-08-20 DIAGNOSIS — D50.9 IRON DEFICIENCY ANEMIA, UNSPECIFIED IRON DEFICIENCY ANEMIA TYPE: ICD-10-CM

## 2021-08-20 DIAGNOSIS — D63.1 ANEMIA IN CHRONIC KIDNEY DISEASE, UNSPECIFIED CKD STAGE: Primary | ICD-10-CM

## 2021-08-20 PROCEDURE — 63600175 PHARM REV CODE 636 W HCPCS: Mod: TB | Performed by: INTERNAL MEDICINE

## 2021-08-20 PROCEDURE — 96372 THER/PROPH/DIAG INJ SC/IM: CPT

## 2021-08-20 RX ADMIN — EPOETIN ALFA-EPBX 20000 UNITS: 20000 INJECTION, SOLUTION INTRAVENOUS; SUBCUTANEOUS at 11:08

## 2021-08-23 ENCOUNTER — OFFICE VISIT (OUTPATIENT)
Dept: OPHTHALMOLOGY | Facility: CLINIC | Age: 76
End: 2021-08-23
Attending: OPHTHALMOLOGY
Payer: MEDICARE

## 2021-08-23 DIAGNOSIS — H04.123 DRY EYE SYNDROME, BILATERAL: ICD-10-CM

## 2021-08-23 DIAGNOSIS — H10.13 ALLERGIC CONJUNCTIVITIS OF BOTH EYES: ICD-10-CM

## 2021-08-23 DIAGNOSIS — H17.9 CORNEAL SCAR, RIGHT EYE: ICD-10-CM

## 2021-08-23 DIAGNOSIS — H34.8120 HEMISPHERIC RETINAL VEIN OCCLUSION WITH MACULAR EDEMA OF LEFT EYE: Primary | ICD-10-CM

## 2021-08-23 DIAGNOSIS — E11.9 DM TYPE 2 WITHOUT RETINOPATHY: ICD-10-CM

## 2021-08-23 PROCEDURE — 1157F PR ADVANCE CARE PLAN OR EQUIV PRESENT IN MEDICAL RECORD: ICD-10-PCS | Mod: CPTII,S$GLB,, | Performed by: OPHTHALMOLOGY

## 2021-08-23 PROCEDURE — 99999 PR PBB SHADOW E&M-EST. PATIENT-LVL III: ICD-10-PCS | Mod: PBBFAC,,, | Performed by: OPHTHALMOLOGY

## 2021-08-23 PROCEDURE — 92014 COMPRE OPH EXAM EST PT 1/>: CPT | Mod: S$GLB,,, | Performed by: OPHTHALMOLOGY

## 2021-08-23 PROCEDURE — 3044F PR MOST RECENT HEMOGLOBIN A1C LEVEL <7.0%: ICD-10-PCS | Mod: CPTII,S$GLB,, | Performed by: OPHTHALMOLOGY

## 2021-08-23 PROCEDURE — 1159F MED LIST DOCD IN RCRD: CPT | Mod: CPTII,S$GLB,, | Performed by: OPHTHALMOLOGY

## 2021-08-23 PROCEDURE — 3288F FALL RISK ASSESSMENT DOCD: CPT | Mod: CPTII,S$GLB,, | Performed by: OPHTHALMOLOGY

## 2021-08-23 PROCEDURE — 92134 CPTRZ OPH DX IMG PST SGM RTA: CPT | Mod: S$GLB,,, | Performed by: OPHTHALMOLOGY

## 2021-08-23 PROCEDURE — 92014 PR EYE EXAM, EST PATIENT,COMPREHESV: ICD-10-PCS | Mod: S$GLB,,, | Performed by: OPHTHALMOLOGY

## 2021-08-23 PROCEDURE — 1160F RVW MEDS BY RX/DR IN RCRD: CPT | Mod: CPTII,S$GLB,, | Performed by: OPHTHALMOLOGY

## 2021-08-23 PROCEDURE — 99999 PR PBB SHADOW E&M-EST. PATIENT-LVL III: CPT | Mod: PBBFAC,,, | Performed by: OPHTHALMOLOGY

## 2021-08-23 PROCEDURE — 3288F PR FALLS RISK ASSESSMENT DOCUMENTED: ICD-10-PCS | Mod: CPTII,S$GLB,, | Performed by: OPHTHALMOLOGY

## 2021-08-23 PROCEDURE — 1160F PR REVIEW ALL MEDS BY PRESCRIBER/CLIN PHARMACIST DOCUMENTED: ICD-10-PCS | Mod: CPTII,S$GLB,, | Performed by: OPHTHALMOLOGY

## 2021-08-23 PROCEDURE — 1126F AMNT PAIN NOTED NONE PRSNT: CPT | Mod: CPTII,S$GLB,, | Performed by: OPHTHALMOLOGY

## 2021-08-23 PROCEDURE — 1157F ADVNC CARE PLAN IN RCRD: CPT | Mod: CPTII,S$GLB,, | Performed by: OPHTHALMOLOGY

## 2021-08-23 PROCEDURE — 92134 POSTERIOR SEGMENT OCT RETINA (OCULAR COHERENCE TOMOGRAPHY)-BOTH EYES: ICD-10-PCS | Mod: S$GLB,,, | Performed by: OPHTHALMOLOGY

## 2021-08-23 PROCEDURE — 1101F PR PT FALLS ASSESS DOC 0-1 FALLS W/OUT INJ PAST YR: ICD-10-PCS | Mod: CPTII,S$GLB,, | Performed by: OPHTHALMOLOGY

## 2021-08-23 PROCEDURE — 99499 RISK ADDL DX/OHS AUDIT: ICD-10-PCS | Mod: S$GLB,,, | Performed by: OPHTHALMOLOGY

## 2021-08-23 PROCEDURE — 1101F PT FALLS ASSESS-DOCD LE1/YR: CPT | Mod: CPTII,S$GLB,, | Performed by: OPHTHALMOLOGY

## 2021-08-23 PROCEDURE — 99499 UNLISTED E&M SERVICE: CPT | Mod: S$GLB,,, | Performed by: OPHTHALMOLOGY

## 2021-08-23 PROCEDURE — 2023F DILAT RTA XM W/O RTNOPTHY: CPT | Mod: CPTII,S$GLB,, | Performed by: OPHTHALMOLOGY

## 2021-08-23 PROCEDURE — 1159F PR MEDICATION LIST DOCUMENTED IN MEDICAL RECORD: ICD-10-PCS | Mod: CPTII,S$GLB,, | Performed by: OPHTHALMOLOGY

## 2021-08-23 PROCEDURE — 1126F PR PAIN SEVERITY QUANTIFIED, NO PAIN PRESENT: ICD-10-PCS | Mod: CPTII,S$GLB,, | Performed by: OPHTHALMOLOGY

## 2021-08-23 PROCEDURE — 2023F PR DILATED RETINAL EXAM W/O EVID OF RETINOPATHY: ICD-10-PCS | Mod: CPTII,S$GLB,, | Performed by: OPHTHALMOLOGY

## 2021-08-23 PROCEDURE — 3044F HG A1C LEVEL LT 7.0%: CPT | Mod: CPTII,S$GLB,, | Performed by: OPHTHALMOLOGY

## 2021-08-23 RX ORDER — OLOPATADINE HYDROCHLORIDE 1 MG/ML
1 SOLUTION/ DROPS OPHTHALMIC DAILY PRN
Qty: 5 ML | Refills: 1 | Status: SHIPPED | OUTPATIENT
Start: 2021-08-23 | End: 2024-03-18

## 2021-08-27 ENCOUNTER — PATIENT MESSAGE (OUTPATIENT)
Dept: PAIN MEDICINE | Facility: OTHER | Age: 76
End: 2021-08-27

## 2021-09-03 ENCOUNTER — PATIENT MESSAGE (OUTPATIENT)
Dept: NEUROSURGERY | Facility: CLINIC | Age: 76
End: 2021-09-03

## 2021-09-08 ENCOUNTER — CLINICAL SUPPORT (OUTPATIENT)
Dept: URGENT CARE | Facility: CLINIC | Age: 76
End: 2021-09-08
Payer: MEDICARE

## 2021-09-08 DIAGNOSIS — Z11.52 ENCOUNTER FOR SCREENING LABORATORY TESTING FOR COVID-19 VIRUS: Primary | ICD-10-CM

## 2021-09-08 LAB
CTP QC/QA: YES
SARS-COV-2 RDRP RESP QL NAA+PROBE: NEGATIVE

## 2021-09-08 PROCEDURE — U0002: ICD-10-PCS | Mod: QW,S$GLB,, | Performed by: NURSE PRACTITIONER

## 2021-09-08 PROCEDURE — U0002 COVID-19 LAB TEST NON-CDC: HCPCS | Mod: QW,S$GLB,, | Performed by: NURSE PRACTITIONER

## 2021-09-14 ENCOUNTER — PATIENT MESSAGE (OUTPATIENT)
Dept: RHEUMATOLOGY | Facility: CLINIC | Age: 76
End: 2021-09-14

## 2021-09-14 DIAGNOSIS — M79.10 MYALGIA: ICD-10-CM

## 2021-09-14 DIAGNOSIS — G72.9 MYOPATHY: Primary | ICD-10-CM

## 2021-09-14 RX ORDER — TIZANIDINE 2 MG/1
4 TABLET ORAL EVERY 8 HOURS PRN
Qty: 270 TABLET | Refills: 0 | Status: SHIPPED | OUTPATIENT
Start: 2021-09-14 | End: 2021-10-14

## 2021-09-17 ENCOUNTER — INFUSION (OUTPATIENT)
Dept: INFUSION THERAPY | Facility: HOSPITAL | Age: 76
End: 2021-09-17
Attending: INTERNAL MEDICINE
Payer: MEDICARE

## 2021-09-17 VITALS
OXYGEN SATURATION: 98 % | SYSTOLIC BLOOD PRESSURE: 131 MMHG | TEMPERATURE: 99 F | RESPIRATION RATE: 16 BRPM | DIASTOLIC BLOOD PRESSURE: 63 MMHG | HEART RATE: 95 BPM

## 2021-09-17 DIAGNOSIS — Z53.1 REFUSAL OF BLOOD TRANSFUSIONS AS PATIENT IS JEHOVAH'S WITNESS: ICD-10-CM

## 2021-09-17 DIAGNOSIS — N18.9 ANEMIA IN CHRONIC KIDNEY DISEASE, UNSPECIFIED CKD STAGE: Primary | ICD-10-CM

## 2021-09-17 DIAGNOSIS — D50.9 IRON DEFICIENCY ANEMIA, UNSPECIFIED IRON DEFICIENCY ANEMIA TYPE: ICD-10-CM

## 2021-09-17 DIAGNOSIS — D63.1 ANEMIA IN CHRONIC KIDNEY DISEASE, UNSPECIFIED CKD STAGE: Primary | ICD-10-CM

## 2021-09-17 DIAGNOSIS — D64.9 ANEMIA: Primary | ICD-10-CM

## 2021-09-17 PROCEDURE — 96372 THER/PROPH/DIAG INJ SC/IM: CPT

## 2021-09-17 PROCEDURE — 63600175 PHARM REV CODE 636 W HCPCS: Mod: TB | Performed by: INTERNAL MEDICINE

## 2021-09-17 RX ADMIN — EPOETIN ALFA-EPBX 20000 UNITS: 20000 INJECTION, SOLUTION INTRAVENOUS; SUBCUTANEOUS at 11:09

## 2021-09-23 ENCOUNTER — OFFICE VISIT (OUTPATIENT)
Dept: FAMILY MEDICINE | Facility: CLINIC | Age: 76
End: 2021-09-23
Payer: MEDICARE

## 2021-09-23 VITALS
OXYGEN SATURATION: 97 % | BODY MASS INDEX: 21.4 KG/M2 | HEART RATE: 108 BPM | WEIGHT: 116.31 LBS | SYSTOLIC BLOOD PRESSURE: 140 MMHG | TEMPERATURE: 98 F | HEIGHT: 62 IN | DIASTOLIC BLOOD PRESSURE: 74 MMHG

## 2021-09-23 DIAGNOSIS — E11.65 CONTROLLED TYPE 2 DIABETES MELLITUS WITH HYPERGLYCEMIA, WITHOUT LONG-TERM CURRENT USE OF INSULIN: Primary | ICD-10-CM

## 2021-09-23 DIAGNOSIS — I10 ESSENTIAL HYPERTENSION: Chronic | ICD-10-CM

## 2021-09-23 DIAGNOSIS — N18.30 ANEMIA IN STAGE 3 CHRONIC KIDNEY DISEASE, UNSPECIFIED WHETHER STAGE 3A OR 3B CKD: ICD-10-CM

## 2021-09-23 DIAGNOSIS — M48.02 CERVICAL SPINAL STENOSIS: ICD-10-CM

## 2021-09-23 DIAGNOSIS — E78.2 COMBINED HYPERLIPIDEMIA ASSOCIATED WITH TYPE 2 DIABETES MELLITUS: Chronic | ICD-10-CM

## 2021-09-23 DIAGNOSIS — S22.048S: ICD-10-CM

## 2021-09-23 DIAGNOSIS — Z91.199 HISTORY OF NONCOMPLIANCE WITH MEDICAL TREATMENT: ICD-10-CM

## 2021-09-23 DIAGNOSIS — G89.4 CHRONIC PAIN SYNDROME: Chronic | ICD-10-CM

## 2021-09-23 DIAGNOSIS — D63.1 ANEMIA IN STAGE 3 CHRONIC KIDNEY DISEASE, UNSPECIFIED WHETHER STAGE 3A OR 3B CKD: ICD-10-CM

## 2021-09-23 DIAGNOSIS — M54.16 LUMBAR RADICULOPATHY: ICD-10-CM

## 2021-09-23 DIAGNOSIS — E11.69 COMBINED HYPERLIPIDEMIA ASSOCIATED WITH TYPE 2 DIABETES MELLITUS: Chronic | ICD-10-CM

## 2021-09-23 DIAGNOSIS — D86.9 SARCOIDOSIS: Chronic | ICD-10-CM

## 2021-09-23 PROBLEM — F11.90 CHRONIC, CONTINUOUS USE OF OPIOIDS: Status: RESOLVED | Noted: 2020-02-07 | Resolved: 2021-09-23

## 2021-09-23 PROCEDURE — 1125F PR PAIN SEVERITY QUANTIFIED, PAIN PRESENT: ICD-10-PCS | Mod: CPTII,S$GLB,, | Performed by: FAMILY MEDICINE

## 2021-09-23 PROCEDURE — 3077F SYST BP >= 140 MM HG: CPT | Mod: CPTII,S$GLB,, | Performed by: FAMILY MEDICINE

## 2021-09-23 PROCEDURE — 1157F ADVNC CARE PLAN IN RCRD: CPT | Mod: CPTII,S$GLB,, | Performed by: FAMILY MEDICINE

## 2021-09-23 PROCEDURE — 3072F PR LOW RISK FOR RETINOPATHY: ICD-10-PCS | Mod: CPTII,S$GLB,, | Performed by: FAMILY MEDICINE

## 2021-09-23 PROCEDURE — 3078F PR MOST RECENT DIASTOLIC BLOOD PRESSURE < 80 MM HG: ICD-10-PCS | Mod: CPTII,S$GLB,, | Performed by: FAMILY MEDICINE

## 2021-09-23 PROCEDURE — 3072F LOW RISK FOR RETINOPATHY: CPT | Mod: CPTII,S$GLB,, | Performed by: FAMILY MEDICINE

## 2021-09-23 PROCEDURE — 3288F PR FALLS RISK ASSESSMENT DOCUMENTED: ICD-10-PCS | Mod: CPTII,S$GLB,, | Performed by: FAMILY MEDICINE

## 2021-09-23 PROCEDURE — 1100F PR PT FALLS ASSESS DOC 2+ FALLS/FALL W/INJURY/YR: ICD-10-PCS | Mod: CPTII,S$GLB,, | Performed by: FAMILY MEDICINE

## 2021-09-23 PROCEDURE — 3288F FALL RISK ASSESSMENT DOCD: CPT | Mod: CPTII,S$GLB,, | Performed by: FAMILY MEDICINE

## 2021-09-23 PROCEDURE — 3044F HG A1C LEVEL LT 7.0%: CPT | Mod: CPTII,S$GLB,, | Performed by: FAMILY MEDICINE

## 2021-09-23 PROCEDURE — 1159F MED LIST DOCD IN RCRD: CPT | Mod: CPTII,S$GLB,, | Performed by: FAMILY MEDICINE

## 2021-09-23 PROCEDURE — 99214 PR OFFICE/OUTPT VISIT, EST, LEVL IV, 30-39 MIN: ICD-10-PCS | Mod: S$GLB,,, | Performed by: FAMILY MEDICINE

## 2021-09-23 PROCEDURE — 1125F AMNT PAIN NOTED PAIN PRSNT: CPT | Mod: CPTII,S$GLB,, | Performed by: FAMILY MEDICINE

## 2021-09-23 PROCEDURE — 3078F DIAST BP <80 MM HG: CPT | Mod: CPTII,S$GLB,, | Performed by: FAMILY MEDICINE

## 2021-09-23 PROCEDURE — 99999 PR PBB SHADOW E&M-EST. PATIENT-LVL IV: CPT | Mod: PBBFAC,,, | Performed by: FAMILY MEDICINE

## 2021-09-23 PROCEDURE — 99214 OFFICE O/P EST MOD 30 MIN: CPT | Mod: S$GLB,,, | Performed by: FAMILY MEDICINE

## 2021-09-23 PROCEDURE — 1100F PTFALLS ASSESS-DOCD GE2>/YR: CPT | Mod: CPTII,S$GLB,, | Performed by: FAMILY MEDICINE

## 2021-09-23 PROCEDURE — 99999 PR PBB SHADOW E&M-EST. PATIENT-LVL IV: ICD-10-PCS | Mod: PBBFAC,,, | Performed by: FAMILY MEDICINE

## 2021-09-23 PROCEDURE — 1159F PR MEDICATION LIST DOCUMENTED IN MEDICAL RECORD: ICD-10-PCS | Mod: CPTII,S$GLB,, | Performed by: FAMILY MEDICINE

## 2021-09-23 PROCEDURE — 1157F PR ADVANCE CARE PLAN OR EQUIV PRESENT IN MEDICAL RECORD: ICD-10-PCS | Mod: CPTII,S$GLB,, | Performed by: FAMILY MEDICINE

## 2021-09-23 PROCEDURE — 3077F PR MOST RECENT SYSTOLIC BLOOD PRESSURE >= 140 MM HG: ICD-10-PCS | Mod: CPTII,S$GLB,, | Performed by: FAMILY MEDICINE

## 2021-09-23 PROCEDURE — 3044F PR MOST RECENT HEMOGLOBIN A1C LEVEL <7.0%: ICD-10-PCS | Mod: CPTII,S$GLB,, | Performed by: FAMILY MEDICINE

## 2021-09-23 RX ORDER — DULOXETIN HYDROCHLORIDE 20 MG/1
20 CAPSULE, DELAYED RELEASE ORAL DAILY
Qty: 90 CAPSULE | Refills: 0 | Status: SHIPPED | OUTPATIENT
Start: 2021-09-23 | End: 2022-07-27 | Stop reason: SDUPTHER

## 2021-09-23 RX ORDER — CELECOXIB 100 MG/1
100 CAPSULE ORAL DAILY
Qty: 90 CAPSULE | Refills: 0 | Status: SHIPPED | OUTPATIENT
Start: 2021-09-23 | End: 2021-11-29 | Stop reason: SDUPTHER

## 2021-09-24 PROBLEM — Z91.199 HISTORY OF NONCOMPLIANCE WITH MEDICAL TREATMENT: Status: ACTIVE | Noted: 2021-09-24

## 2021-09-30 ENCOUNTER — PATIENT MESSAGE (OUTPATIENT)
Dept: FAMILY MEDICINE | Facility: CLINIC | Age: 76
End: 2021-09-30

## 2021-09-30 DIAGNOSIS — N18.30 DIABETES MELLITUS WITH STAGE 3 CHRONIC KIDNEY DISEASE: ICD-10-CM

## 2021-09-30 DIAGNOSIS — E11.22 DIABETES MELLITUS WITH STAGE 3 CHRONIC KIDNEY DISEASE: ICD-10-CM

## 2021-09-30 DIAGNOSIS — E11.9 DIABETES MELLITUS, TYPE 2: Chronic | ICD-10-CM

## 2021-09-30 RX ORDER — METFORMIN HYDROCHLORIDE 1000 MG/1
TABLET ORAL
Qty: 180 TABLET | Refills: 1 | Status: SHIPPED | OUTPATIENT
Start: 2021-09-30 | End: 2022-07-06

## 2021-10-01 ENCOUNTER — INFUSION (OUTPATIENT)
Dept: INFUSION THERAPY | Facility: HOSPITAL | Age: 76
End: 2021-10-01
Attending: INTERNAL MEDICINE
Payer: MEDICARE

## 2021-10-01 VITALS
OXYGEN SATURATION: 96 % | RESPIRATION RATE: 16 BRPM | SYSTOLIC BLOOD PRESSURE: 129 MMHG | HEART RATE: 85 BPM | DIASTOLIC BLOOD PRESSURE: 65 MMHG | TEMPERATURE: 98 F

## 2021-10-01 DIAGNOSIS — D50.9 IRON DEFICIENCY ANEMIA, UNSPECIFIED IRON DEFICIENCY ANEMIA TYPE: ICD-10-CM

## 2021-10-01 DIAGNOSIS — Z53.1 REFUSAL OF BLOOD TRANSFUSIONS AS PATIENT IS JEHOVAH'S WITNESS: ICD-10-CM

## 2021-10-01 DIAGNOSIS — N18.9 ANEMIA IN CHRONIC KIDNEY DISEASE, UNSPECIFIED CKD STAGE: Primary | ICD-10-CM

## 2021-10-01 DIAGNOSIS — D63.1 ANEMIA IN CHRONIC KIDNEY DISEASE, UNSPECIFIED CKD STAGE: Primary | ICD-10-CM

## 2021-10-01 PROCEDURE — 63600175 PHARM REV CODE 636 W HCPCS: Mod: TB | Performed by: INTERNAL MEDICINE

## 2021-10-01 PROCEDURE — 96372 THER/PROPH/DIAG INJ SC/IM: CPT

## 2021-10-01 RX ADMIN — EPOETIN ALFA-EPBX 20000 UNITS: 20000 INJECTION, SOLUTION INTRAVENOUS; SUBCUTANEOUS at 12:10

## 2021-10-15 ENCOUNTER — INFUSION (OUTPATIENT)
Dept: INFUSION THERAPY | Facility: HOSPITAL | Age: 76
End: 2021-10-15
Attending: INTERNAL MEDICINE
Payer: MEDICARE

## 2021-10-15 VITALS
OXYGEN SATURATION: 98 % | TEMPERATURE: 98 F | SYSTOLIC BLOOD PRESSURE: 118 MMHG | RESPIRATION RATE: 16 BRPM | DIASTOLIC BLOOD PRESSURE: 58 MMHG | HEART RATE: 97 BPM

## 2021-10-15 DIAGNOSIS — D50.9 IRON DEFICIENCY ANEMIA, UNSPECIFIED IRON DEFICIENCY ANEMIA TYPE: ICD-10-CM

## 2021-10-15 DIAGNOSIS — Z53.1 REFUSAL OF BLOOD TRANSFUSIONS AS PATIENT IS JEHOVAH'S WITNESS: ICD-10-CM

## 2021-10-15 DIAGNOSIS — N18.9 ANEMIA IN CHRONIC KIDNEY DISEASE, UNSPECIFIED CKD STAGE: Primary | ICD-10-CM

## 2021-10-15 DIAGNOSIS — D63.1 ANEMIA IN CHRONIC KIDNEY DISEASE, UNSPECIFIED CKD STAGE: Primary | ICD-10-CM

## 2021-10-15 PROCEDURE — 96372 THER/PROPH/DIAG INJ SC/IM: CPT

## 2021-10-15 PROCEDURE — 63600175 PHARM REV CODE 636 W HCPCS: Mod: JG | Performed by: INTERNAL MEDICINE

## 2021-10-15 RX ADMIN — EPOETIN ALFA-EPBX 20000 UNITS: 20000 INJECTION, SOLUTION INTRAVENOUS; SUBCUTANEOUS at 11:10

## 2021-10-20 ENCOUNTER — LAB VISIT (OUTPATIENT)
Dept: LAB | Facility: HOSPITAL | Age: 76
End: 2021-10-20
Attending: FAMILY MEDICINE
Payer: MEDICARE

## 2021-10-20 DIAGNOSIS — D63.1 ANEMIA IN STAGE 3 CHRONIC KIDNEY DISEASE, UNSPECIFIED WHETHER STAGE 3A OR 3B CKD: ICD-10-CM

## 2021-10-20 DIAGNOSIS — N18.30 ANEMIA IN STAGE 3 CHRONIC KIDNEY DISEASE, UNSPECIFIED WHETHER STAGE 3A OR 3B CKD: ICD-10-CM

## 2021-10-20 LAB
ALBUMIN SERPL BCP-MCNC: 3.7 G/DL (ref 3.5–5.2)
ALP SERPL-CCNC: 42 U/L (ref 55–135)
ALT SERPL W/O P-5'-P-CCNC: 9 U/L (ref 10–44)
ANION GAP SERPL CALC-SCNC: 15 MMOL/L (ref 8–16)
AST SERPL-CCNC: 15 U/L (ref 10–40)
BILIRUB SERPL-MCNC: 0.4 MG/DL (ref 0.1–1)
BUN SERPL-MCNC: 14 MG/DL (ref 8–23)
CALCIUM SERPL-MCNC: 9.7 MG/DL (ref 8.7–10.5)
CHLORIDE SERPL-SCNC: 102 MMOL/L (ref 95–110)
CO2 SERPL-SCNC: 20 MMOL/L (ref 23–29)
CREAT SERPL-MCNC: 1.1 MG/DL (ref 0.5–1.4)
EST. GFR  (AFRICAN AMERICAN): 56.8 ML/MIN/1.73 M^2
EST. GFR  (NON AFRICAN AMERICAN): 49.2 ML/MIN/1.73 M^2
GLUCOSE SERPL-MCNC: 119 MG/DL (ref 70–110)
POTASSIUM SERPL-SCNC: 3.3 MMOL/L (ref 3.5–5.1)
PROT SERPL-MCNC: 6.7 G/DL (ref 6–8.4)
SODIUM SERPL-SCNC: 137 MMOL/L (ref 136–145)

## 2021-10-20 PROCEDURE — 36415 COLL VENOUS BLD VENIPUNCTURE: CPT | Mod: PO | Performed by: FAMILY MEDICINE

## 2021-10-20 PROCEDURE — 80053 COMPREHEN METABOLIC PANEL: CPT | Performed by: FAMILY MEDICINE

## 2021-10-28 ENCOUNTER — PATIENT MESSAGE (OUTPATIENT)
Dept: FAMILY MEDICINE | Facility: CLINIC | Age: 76
End: 2021-10-28
Payer: MEDICARE

## 2021-10-29 ENCOUNTER — INFUSION (OUTPATIENT)
Dept: INFUSION THERAPY | Facility: HOSPITAL | Age: 76
End: 2021-10-29
Attending: INTERNAL MEDICINE
Payer: MEDICARE

## 2021-10-29 VITALS
OXYGEN SATURATION: 100 % | SYSTOLIC BLOOD PRESSURE: 135 MMHG | RESPIRATION RATE: 16 BRPM | TEMPERATURE: 98 F | HEART RATE: 83 BPM | DIASTOLIC BLOOD PRESSURE: 68 MMHG

## 2021-10-29 DIAGNOSIS — D63.1 ANEMIA IN CHRONIC KIDNEY DISEASE, UNSPECIFIED CKD STAGE: Primary | ICD-10-CM

## 2021-10-29 DIAGNOSIS — Z53.1 REFUSAL OF BLOOD TRANSFUSIONS AS PATIENT IS JEHOVAH'S WITNESS: ICD-10-CM

## 2021-10-29 DIAGNOSIS — N18.9 ANEMIA IN CHRONIC KIDNEY DISEASE, UNSPECIFIED CKD STAGE: Primary | ICD-10-CM

## 2021-10-29 DIAGNOSIS — D50.9 IRON DEFICIENCY ANEMIA, UNSPECIFIED IRON DEFICIENCY ANEMIA TYPE: ICD-10-CM

## 2021-10-29 PROCEDURE — 96372 THER/PROPH/DIAG INJ SC/IM: CPT

## 2021-10-29 PROCEDURE — 63600175 PHARM REV CODE 636 W HCPCS: Mod: JG | Performed by: INTERNAL MEDICINE

## 2021-10-29 RX ADMIN — EPOETIN ALFA-EPBX 20000 UNITS: 20000 INJECTION, SOLUTION INTRAVENOUS; SUBCUTANEOUS at 12:10

## 2021-11-02 ENCOUNTER — TELEPHONE (OUTPATIENT)
Dept: NEUROSURGERY | Facility: CLINIC | Age: 76
End: 2021-11-02
Payer: MEDICARE

## 2021-11-08 ENCOUNTER — PATIENT MESSAGE (OUTPATIENT)
Dept: RHEUMATOLOGY | Facility: CLINIC | Age: 76
End: 2021-11-08
Payer: MEDICARE

## 2021-11-08 DIAGNOSIS — G72.9 MYOPATHY: ICD-10-CM

## 2021-11-08 DIAGNOSIS — D86.9 SARCOIDOSIS: ICD-10-CM

## 2021-11-09 RX ORDER — PREDNISONE 5 MG/1
5 TABLET ORAL DAILY
Qty: 90 TABLET | Refills: 0 | Status: SHIPPED | OUTPATIENT
Start: 2021-11-09 | End: 2022-01-10 | Stop reason: SDUPTHER

## 2021-11-10 ENCOUNTER — OFFICE VISIT (OUTPATIENT)
Dept: NEUROSURGERY | Facility: CLINIC | Age: 76
End: 2021-11-10
Payer: MEDICARE

## 2021-11-10 ENCOUNTER — HOSPITAL ENCOUNTER (OUTPATIENT)
Dept: RADIOLOGY | Facility: HOSPITAL | Age: 76
Discharge: HOME | End: 2021-11-10
Attending: NEUROLOGICAL SURGERY
Payer: MEDICARE

## 2021-11-10 VITALS
HEART RATE: 101 BPM | WEIGHT: 114.63 LBS | DIASTOLIC BLOOD PRESSURE: 77 MMHG | SYSTOLIC BLOOD PRESSURE: 171 MMHG | BODY MASS INDEX: 20.97 KG/M2

## 2021-11-10 DIAGNOSIS — S22.000D OPEN WEDGE FRACTURE OF THORACIC VERTEBRA WITH ROUTINE HEALING, UNSPECIFIED THORACIC VERTEBRAL LEVEL, SUBSEQUENT ENCOUNTER: ICD-10-CM

## 2021-11-10 DIAGNOSIS — Z98.1 S/P CERVICAL SPINAL FUSION: ICD-10-CM

## 2021-11-10 DIAGNOSIS — S22.041A: ICD-10-CM

## 2021-11-10 DIAGNOSIS — Z98.1 S/P CERVICAL SPINAL FUSION: Primary | ICD-10-CM

## 2021-11-10 PROCEDURE — 72125 CT NECK SPINE W/O DYE: CPT | Mod: 26,,, | Performed by: STUDENT IN AN ORGANIZED HEALTH CARE EDUCATION/TRAINING PROGRAM

## 2021-11-10 PROCEDURE — 1157F ADVNC CARE PLAN IN RCRD: CPT | Mod: ,,, | Performed by: NEUROLOGICAL SURGERY

## 2021-11-10 PROCEDURE — 3072F PR LOW RISK FOR RETINOPATHY: ICD-10-PCS | Mod: ,,, | Performed by: NEUROLOGICAL SURGERY

## 2021-11-10 PROCEDURE — 72125 CT CERVICAL SPINE WITHOUT CONTRAST: ICD-10-PCS | Mod: 26,,, | Performed by: STUDENT IN AN ORGANIZED HEALTH CARE EDUCATION/TRAINING PROGRAM

## 2021-11-10 PROCEDURE — 1157F PR ADVANCE CARE PLAN OR EQUIV PRESENT IN MEDICAL RECORD: ICD-10-PCS | Mod: ,,, | Performed by: NEUROLOGICAL SURGERY

## 2021-11-10 PROCEDURE — 99999 PR PBB SHADOW E&M-EST. PATIENT-LVL IV: ICD-10-PCS | Mod: PBBFAC,,, | Performed by: NEUROLOGICAL SURGERY

## 2021-11-10 PROCEDURE — 99214 OFFICE O/P EST MOD 30 MIN: CPT | Mod: S$PBB,,, | Performed by: NEUROLOGICAL SURGERY

## 2021-11-10 PROCEDURE — 3072F LOW RISK FOR RETINOPATHY: CPT | Mod: ,,, | Performed by: NEUROLOGICAL SURGERY

## 2021-11-10 PROCEDURE — 72125 CT NECK SPINE W/O DYE: CPT | Mod: TC

## 2021-11-10 PROCEDURE — 72128 CT CHEST SPINE W/O DYE: CPT | Mod: 26,,, | Performed by: STUDENT IN AN ORGANIZED HEALTH CARE EDUCATION/TRAINING PROGRAM

## 2021-11-10 PROCEDURE — 72128 CT THORACIC SPINE WITHOUT CONTRAST: ICD-10-PCS | Mod: 26,,, | Performed by: STUDENT IN AN ORGANIZED HEALTH CARE EDUCATION/TRAINING PROGRAM

## 2021-11-10 PROCEDURE — 99999 PR PBB SHADOW E&M-EST. PATIENT-LVL IV: CPT | Mod: PBBFAC,,, | Performed by: NEUROLOGICAL SURGERY

## 2021-11-10 PROCEDURE — 99214 PR OFFICE/OUTPT VISIT, EST, LEVL IV, 30-39 MIN: ICD-10-PCS | Mod: S$PBB,,, | Performed by: NEUROLOGICAL SURGERY

## 2021-11-10 PROCEDURE — 72128 CT CHEST SPINE W/O DYE: CPT | Mod: TC

## 2021-11-12 ENCOUNTER — INFUSION (OUTPATIENT)
Dept: INFUSION THERAPY | Facility: HOSPITAL | Age: 76
End: 2021-11-12
Attending: INTERNAL MEDICINE
Payer: MEDICARE

## 2021-11-12 VITALS
OXYGEN SATURATION: 100 % | DIASTOLIC BLOOD PRESSURE: 66 MMHG | HEART RATE: 99 BPM | SYSTOLIC BLOOD PRESSURE: 139 MMHG | TEMPERATURE: 98 F | RESPIRATION RATE: 16 BRPM

## 2021-11-19 ENCOUNTER — IMMUNIZATION (OUTPATIENT)
Dept: OBSTETRICS AND GYNECOLOGY | Facility: CLINIC | Age: 76
End: 2021-11-19
Payer: MEDICARE

## 2021-11-19 DIAGNOSIS — Z23 NEED FOR VACCINATION: Primary | ICD-10-CM

## 2021-11-19 PROCEDURE — 91300 COVID-19, MRNA, LNP-S, PF, 30 MCG/0.3 ML DOSE VACCINE: CPT | Mod: PBBFAC

## 2021-11-19 PROCEDURE — 0003A COVID-19, MRNA, LNP-S, PF, 30 MCG/0.3 ML DOSE VACCINE: CPT | Mod: PBBFAC

## 2021-11-22 ENCOUNTER — PATIENT MESSAGE (OUTPATIENT)
Dept: FAMILY MEDICINE | Facility: CLINIC | Age: 76
End: 2021-11-22
Payer: MEDICARE

## 2021-11-22 RX ORDER — POTASSIUM CHLORIDE 20 MEQ/1
20 TABLET, EXTENDED RELEASE ORAL 2 TIMES DAILY
COMMUNITY
End: 2021-11-22 | Stop reason: SDUPTHER

## 2021-11-22 RX ORDER — POTASSIUM CHLORIDE 20 MEQ/1
20 TABLET, EXTENDED RELEASE ORAL 2 TIMES DAILY
Qty: 180 TABLET | Refills: 1 | Status: SHIPPED | OUTPATIENT
Start: 2021-11-22 | End: 2023-05-15

## 2021-11-26 ENCOUNTER — INFUSION (OUTPATIENT)
Dept: INFUSION THERAPY | Facility: HOSPITAL | Age: 76
End: 2021-11-26
Attending: INTERNAL MEDICINE
Payer: MEDICARE

## 2021-11-26 VITALS
TEMPERATURE: 99 F | SYSTOLIC BLOOD PRESSURE: 148 MMHG | HEART RATE: 91 BPM | DIASTOLIC BLOOD PRESSURE: 66 MMHG | RESPIRATION RATE: 16 BRPM | OXYGEN SATURATION: 100 %

## 2021-11-26 DIAGNOSIS — N18.9 ANEMIA IN CHRONIC KIDNEY DISEASE, UNSPECIFIED CKD STAGE: Primary | ICD-10-CM

## 2021-11-26 DIAGNOSIS — Z53.1 REFUSAL OF BLOOD TRANSFUSIONS AS PATIENT IS JEHOVAH'S WITNESS: ICD-10-CM

## 2021-11-26 DIAGNOSIS — D63.1 ANEMIA IN CHRONIC KIDNEY DISEASE, UNSPECIFIED CKD STAGE: Primary | ICD-10-CM

## 2021-11-26 DIAGNOSIS — D50.9 IRON DEFICIENCY ANEMIA, UNSPECIFIED IRON DEFICIENCY ANEMIA TYPE: ICD-10-CM

## 2021-11-26 PROCEDURE — 96372 THER/PROPH/DIAG INJ SC/IM: CPT

## 2021-11-26 PROCEDURE — 63600175 PHARM REV CODE 636 W HCPCS: Mod: JG | Performed by: INTERNAL MEDICINE

## 2021-11-26 RX ADMIN — EPOETIN ALFA-EPBX 20000 UNITS: 20000 INJECTION, SOLUTION INTRAVENOUS; SUBCUTANEOUS at 11:11

## 2021-11-29 ENCOUNTER — OFFICE VISIT (OUTPATIENT)
Dept: FAMILY MEDICINE | Facility: CLINIC | Age: 76
End: 2021-11-29
Payer: MEDICARE

## 2021-11-29 VITALS
TEMPERATURE: 98 F | HEART RATE: 97 BPM | SYSTOLIC BLOOD PRESSURE: 130 MMHG | BODY MASS INDEX: 23.4 KG/M2 | HEIGHT: 62 IN | OXYGEN SATURATION: 96 % | DIASTOLIC BLOOD PRESSURE: 58 MMHG | WEIGHT: 127.19 LBS

## 2021-11-29 DIAGNOSIS — G62.9 POLYNEUROPATHY: ICD-10-CM

## 2021-11-29 DIAGNOSIS — D86.9 SARCOIDOSIS: Chronic | ICD-10-CM

## 2021-11-29 DIAGNOSIS — M51.36 DEGENERATIVE DISC DISEASE, LUMBAR: ICD-10-CM

## 2021-11-29 DIAGNOSIS — M51.16 LUMBAR DISC HERNIATION WITH RADICULOPATHY: ICD-10-CM

## 2021-11-29 DIAGNOSIS — E11.3292 CONTROLLED TYPE 2 DIABETES MELLITUS WITH LEFT EYE AFFECTED BY MILD NONPROLIFERATIVE RETINOPATHY WITHOUT MACULAR EDEMA, WITHOUT LONG-TERM CURRENT USE OF INSULIN: Chronic | ICD-10-CM

## 2021-11-29 DIAGNOSIS — M48.02 CERVICAL SPINAL STENOSIS: Primary | ICD-10-CM

## 2021-11-29 PROCEDURE — 99214 PR OFFICE/OUTPT VISIT, EST, LEVL IV, 30-39 MIN: ICD-10-PCS | Mod: 25,S$GLB,, | Performed by: FAMILY MEDICINE

## 2021-11-29 PROCEDURE — 96372 PR INJECTION,THERAP/PROPH/DIAG2ST, IM OR SUBCUT: ICD-10-PCS | Mod: S$GLB,,, | Performed by: FAMILY MEDICINE

## 2021-11-29 PROCEDURE — 3072F LOW RISK FOR RETINOPATHY: CPT | Mod: CPTII,S$GLB,, | Performed by: FAMILY MEDICINE

## 2021-11-29 PROCEDURE — 1157F PR ADVANCE CARE PLAN OR EQUIV PRESENT IN MEDICAL RECORD: ICD-10-PCS | Mod: CPTII,S$GLB,, | Performed by: FAMILY MEDICINE

## 2021-11-29 PROCEDURE — 96372 THER/PROPH/DIAG INJ SC/IM: CPT | Mod: S$GLB,,, | Performed by: FAMILY MEDICINE

## 2021-11-29 PROCEDURE — 1157F ADVNC CARE PLAN IN RCRD: CPT | Mod: CPTII,S$GLB,, | Performed by: FAMILY MEDICINE

## 2021-11-29 PROCEDURE — 3072F PR LOW RISK FOR RETINOPATHY: ICD-10-PCS | Mod: CPTII,S$GLB,, | Performed by: FAMILY MEDICINE

## 2021-11-29 PROCEDURE — 99214 OFFICE O/P EST MOD 30 MIN: CPT | Mod: 25,S$GLB,, | Performed by: FAMILY MEDICINE

## 2021-11-29 PROCEDURE — 99999 PR PBB SHADOW E&M-EST. PATIENT-LVL V: ICD-10-PCS | Mod: PBBFAC,,, | Performed by: FAMILY MEDICINE

## 2021-11-29 PROCEDURE — 99999 PR PBB SHADOW E&M-EST. PATIENT-LVL V: CPT | Mod: PBBFAC,,, | Performed by: FAMILY MEDICINE

## 2021-11-29 RX ORDER — CELECOXIB 100 MG/1
100 CAPSULE ORAL DAILY
Qty: 90 CAPSULE | Refills: 0 | Status: SHIPPED | OUTPATIENT
Start: 2021-11-29 | End: 2022-04-04 | Stop reason: SDUPTHER

## 2021-11-29 RX ORDER — TRIAMCINOLONE ACETONIDE 40 MG/ML
80 INJECTION, SUSPENSION INTRA-ARTICULAR; INTRAMUSCULAR ONCE
Status: COMPLETED | OUTPATIENT
Start: 2021-11-29 | End: 2021-11-29

## 2021-11-29 RX ADMIN — TRIAMCINOLONE ACETONIDE 80 MG: 40 INJECTION, SUSPENSION INTRA-ARTICULAR; INTRAMUSCULAR at 12:11

## 2021-12-05 ENCOUNTER — HOSPITAL ENCOUNTER (EMERGENCY)
Facility: HOSPITAL | Age: 76
Discharge: HOME | End: 2021-12-05
Payer: MEDICARE

## 2021-12-05 VITALS
DIASTOLIC BLOOD PRESSURE: 80 MMHG | WEIGHT: 120 LBS | SYSTOLIC BLOOD PRESSURE: 146 MMHG | BODY MASS INDEX: 22.08 KG/M2 | TEMPERATURE: 98 F | HEART RATE: 112 BPM | OXYGEN SATURATION: 98 % | RESPIRATION RATE: 18 BRPM | HEIGHT: 62 IN

## 2021-12-05 LAB — POCT GLUCOSE: 149 MG/DL (ref 70–110)

## 2021-12-05 PROCEDURE — 99283 EMERGENCY DEPT VISIT LOW MDM: CPT | Mod: 27

## 2021-12-05 PROCEDURE — 99282 EMERGENCY DEPT VISIT SF MDM: CPT | Mod: ER

## 2021-12-05 RX ORDER — TETRACAINE HYDROCHLORIDE 5 MG/ML
2 SOLUTION OPHTHALMIC
Status: COMPLETED | OUTPATIENT
Start: 2021-12-05 | End: 2021-12-06

## 2021-12-06 ENCOUNTER — HOSPITAL ENCOUNTER (EMERGENCY)
Facility: HOSPITAL | Age: 76
Discharge: HOME | End: 2021-12-06
Attending: EMERGENCY MEDICINE
Payer: MEDICARE

## 2021-12-06 ENCOUNTER — TELEPHONE (OUTPATIENT)
Dept: OPHTHALMOLOGY | Facility: CLINIC | Age: 76
End: 2021-12-06
Payer: MEDICARE

## 2021-12-06 VITALS
DIASTOLIC BLOOD PRESSURE: 83 MMHG | BODY MASS INDEX: 22.08 KG/M2 | HEART RATE: 96 BPM | OXYGEN SATURATION: 100 % | RESPIRATION RATE: 20 BRPM | WEIGHT: 120 LBS | SYSTOLIC BLOOD PRESSURE: 156 MMHG | TEMPERATURE: 99 F | HEIGHT: 62 IN

## 2021-12-06 DIAGNOSIS — H11.32 SUBCONJUNCTIVAL HEMORRHAGE OF LEFT EYE: Primary | ICD-10-CM

## 2021-12-06 PROCEDURE — 25000003 PHARM REV CODE 250: Performed by: PHYSICIAN ASSISTANT

## 2021-12-06 RX ADMIN — TETRACAINE HYDROCHLORIDE 2 DROP: 5 SOLUTION OPHTHALMIC at 01:12

## 2021-12-06 RX ADMIN — FLUORESCEIN SODIUM 1 EACH: 1 STRIP OPHTHALMIC at 01:12

## 2021-12-07 ENCOUNTER — PATIENT OUTREACH (OUTPATIENT)
Dept: EMERGENCY MEDICINE | Facility: HOSPITAL | Age: 76
End: 2021-12-07
Payer: MEDICARE

## 2021-12-08 ENCOUNTER — OFFICE VISIT (OUTPATIENT)
Dept: HEMATOLOGY/ONCOLOGY | Facility: CLINIC | Age: 76
End: 2021-12-08
Payer: MEDICARE

## 2021-12-08 VITALS
HEART RATE: 97 BPM | OXYGEN SATURATION: 100 % | DIASTOLIC BLOOD PRESSURE: 79 MMHG | WEIGHT: 120 LBS | SYSTOLIC BLOOD PRESSURE: 173 MMHG | HEIGHT: 62 IN | BODY MASS INDEX: 22.08 KG/M2

## 2021-12-08 DIAGNOSIS — N18.9 CHRONIC KIDNEY DISEASE, UNSPECIFIED CKD STAGE: ICD-10-CM

## 2021-12-08 DIAGNOSIS — N18.9 ANEMIA IN CHRONIC KIDNEY DISEASE, UNSPECIFIED CKD STAGE: Primary | ICD-10-CM

## 2021-12-08 DIAGNOSIS — D63.1 ANEMIA IN CHRONIC KIDNEY DISEASE, UNSPECIFIED CKD STAGE: Primary | ICD-10-CM

## 2021-12-08 DIAGNOSIS — G89.4 CHRONIC PAIN SYNDROME: ICD-10-CM

## 2021-12-08 PROCEDURE — 1157F ADVNC CARE PLAN IN RCRD: CPT | Mod: CPTII,S$GLB,, | Performed by: INTERNAL MEDICINE

## 2021-12-08 PROCEDURE — 1157F PR ADVANCE CARE PLAN OR EQUIV PRESENT IN MEDICAL RECORD: ICD-10-PCS | Mod: CPTII,S$GLB,, | Performed by: INTERNAL MEDICINE

## 2021-12-08 PROCEDURE — 3072F LOW RISK FOR RETINOPATHY: CPT | Mod: CPTII,S$GLB,, | Performed by: INTERNAL MEDICINE

## 2021-12-08 PROCEDURE — 99214 PR OFFICE/OUTPT VISIT, EST, LEVL IV, 30-39 MIN: ICD-10-PCS | Mod: S$GLB,,, | Performed by: INTERNAL MEDICINE

## 2021-12-08 PROCEDURE — 99214 OFFICE O/P EST MOD 30 MIN: CPT | Mod: S$GLB,,, | Performed by: INTERNAL MEDICINE

## 2021-12-08 PROCEDURE — 99999 PR PBB SHADOW E&M-EST. PATIENT-LVL IV: ICD-10-PCS | Mod: PBBFAC,,, | Performed by: INTERNAL MEDICINE

## 2021-12-08 PROCEDURE — 99999 PR PBB SHADOW E&M-EST. PATIENT-LVL IV: CPT | Mod: PBBFAC,,, | Performed by: INTERNAL MEDICINE

## 2021-12-08 PROCEDURE — 3072F PR LOW RISK FOR RETINOPATHY: ICD-10-PCS | Mod: CPTII,S$GLB,, | Performed by: INTERNAL MEDICINE

## 2021-12-10 ENCOUNTER — INFUSION (OUTPATIENT)
Dept: INFUSION THERAPY | Facility: HOSPITAL | Age: 76
End: 2021-12-10
Attending: INTERNAL MEDICINE
Payer: MEDICARE

## 2021-12-10 VITALS
SYSTOLIC BLOOD PRESSURE: 123 MMHG | HEART RATE: 87 BPM | DIASTOLIC BLOOD PRESSURE: 61 MMHG | TEMPERATURE: 98 F | RESPIRATION RATE: 16 BRPM | OXYGEN SATURATION: 99 %

## 2021-12-10 DIAGNOSIS — D50.9 IRON DEFICIENCY ANEMIA, UNSPECIFIED IRON DEFICIENCY ANEMIA TYPE: ICD-10-CM

## 2021-12-10 DIAGNOSIS — Z53.1 REFUSAL OF BLOOD TRANSFUSIONS AS PATIENT IS JEHOVAH'S WITNESS: ICD-10-CM

## 2021-12-10 DIAGNOSIS — D63.1 ANEMIA IN CHRONIC KIDNEY DISEASE, UNSPECIFIED CKD STAGE: Primary | ICD-10-CM

## 2021-12-10 DIAGNOSIS — N18.9 ANEMIA IN CHRONIC KIDNEY DISEASE, UNSPECIFIED CKD STAGE: Primary | ICD-10-CM

## 2021-12-10 PROCEDURE — 96372 THER/PROPH/DIAG INJ SC/IM: CPT

## 2021-12-10 PROCEDURE — 63600175 PHARM REV CODE 636 W HCPCS: Mod: JG,EC | Performed by: INTERNAL MEDICINE

## 2021-12-10 RX ADMIN — EPOETIN ALFA-EPBX 20000 UNITS: 20000 INJECTION, SOLUTION INTRAVENOUS; SUBCUTANEOUS at 11:12

## 2021-12-14 ENCOUNTER — TELEPHONE (OUTPATIENT)
Dept: FAMILY MEDICINE | Facility: CLINIC | Age: 76
End: 2021-12-14
Payer: MEDICARE

## 2021-12-15 ENCOUNTER — PATIENT OUTREACH (OUTPATIENT)
Dept: EMERGENCY MEDICINE | Facility: HOSPITAL | Age: 76
End: 2021-12-15
Payer: MEDICARE

## 2021-12-20 ENCOUNTER — PATIENT OUTREACH (OUTPATIENT)
Dept: EMERGENCY MEDICINE | Facility: HOSPITAL | Age: 76
End: 2021-12-20
Payer: MEDICARE

## 2021-12-23 ENCOUNTER — INFUSION (OUTPATIENT)
Dept: INFUSION THERAPY | Facility: HOSPITAL | Age: 76
End: 2021-12-23
Attending: INTERNAL MEDICINE
Payer: MEDICARE

## 2021-12-23 VITALS
SYSTOLIC BLOOD PRESSURE: 151 MMHG | RESPIRATION RATE: 16 BRPM | TEMPERATURE: 98 F | DIASTOLIC BLOOD PRESSURE: 71 MMHG | HEART RATE: 92 BPM | OXYGEN SATURATION: 98 %

## 2021-12-23 DIAGNOSIS — D63.1 ANEMIA IN CHRONIC KIDNEY DISEASE, UNSPECIFIED CKD STAGE: Primary | ICD-10-CM

## 2021-12-23 DIAGNOSIS — D50.9 IRON DEFICIENCY ANEMIA, UNSPECIFIED IRON DEFICIENCY ANEMIA TYPE: ICD-10-CM

## 2021-12-23 DIAGNOSIS — Z53.1 REFUSAL OF BLOOD TRANSFUSIONS AS PATIENT IS JEHOVAH'S WITNESS: ICD-10-CM

## 2021-12-23 DIAGNOSIS — N18.9 ANEMIA IN CHRONIC KIDNEY DISEASE, UNSPECIFIED CKD STAGE: Primary | ICD-10-CM

## 2021-12-23 PROCEDURE — 96372 THER/PROPH/DIAG INJ SC/IM: CPT

## 2021-12-23 PROCEDURE — 63600175 PHARM REV CODE 636 W HCPCS: Mod: JG | Performed by: INTERNAL MEDICINE

## 2021-12-23 RX ADMIN — EPOETIN ALFA-EPBX 20000 UNITS: 20000 INJECTION, SOLUTION INTRAVENOUS; SUBCUTANEOUS at 12:12

## 2021-12-27 ENCOUNTER — PATIENT OUTREACH (OUTPATIENT)
Dept: EMERGENCY MEDICINE | Facility: HOSPITAL | Age: 76
End: 2021-12-27
Payer: MEDICARE

## 2022-01-03 ENCOUNTER — PATIENT OUTREACH (OUTPATIENT)
Dept: EMERGENCY MEDICINE | Facility: HOSPITAL | Age: 77
End: 2022-01-03
Payer: MEDICARE

## 2022-01-06 ENCOUNTER — PATIENT OUTREACH (OUTPATIENT)
Dept: ADMINISTRATIVE | Facility: OTHER | Age: 77
End: 2022-01-06
Payer: MEDICARE

## 2022-01-06 NOTE — PROGRESS NOTES
Health Maintenance Due   Topic Date Due    Shingles Vaccine (2 of 3) 07/20/2015    Hemoglobin A1c  01/08/2022     Updates were requested from care everywhere.  Chart was reviewed for overdue Proactive [facility] Encounters ([facility]) topics (CRS, Breast Cancer Screening, Eye exam)  Health Maintenance has been updated.  LIN[facility] immunization registry triggered.  Immunizations were reconciled.

## 2022-01-07 ENCOUNTER — LAB VISIT (OUTPATIENT)
Dept: LAB | Facility: HOSPITAL | Age: 77
End: 2022-01-07
Attending: INTERNAL MEDICINE
Payer: MEDICARE

## 2022-01-07 DIAGNOSIS — D64.9 ANEMIA: ICD-10-CM

## 2022-01-07 LAB
ERYTHROCYTE [DISTWIDTH] IN BLOOD BY AUTOMATED COUNT: 13.4 % (ref 11.5–14.5)
HCT VFR BLD AUTO: 34.8 % (ref 37–48.5)
HGB BLD-MCNC: 11.1 G/DL (ref 12–16)
IMM GRANULOCYTES # BLD AUTO: 0.03 K/UL (ref 0–0.04)
MCH RBC QN AUTO: 32.8 PG (ref 27–31)
MCHC RBC AUTO-ENTMCNC: 31.9 G/DL (ref 32–36)
MCV RBC AUTO: 103 FL (ref 82–98)
NEUTROPHILS # BLD AUTO: 2.8 K/UL (ref 1.8–7.7)
PLATELET # BLD AUTO: 325 K/UL (ref 150–450)
PMV BLD AUTO: 8.7 FL (ref 9.2–12.9)
RBC # BLD AUTO: 3.38 M/UL (ref 4–5.4)
WBC # BLD AUTO: 4.94 K/UL (ref 3.9–12.7)

## 2022-01-07 PROCEDURE — 36415 COLL VENOUS BLD VENIPUNCTURE: CPT | Performed by: INTERNAL MEDICINE

## 2022-01-07 PROCEDURE — 85027 COMPLETE CBC AUTOMATED: CPT | Performed by: INTERNAL MEDICINE

## 2022-01-10 ENCOUNTER — PATIENT MESSAGE (OUTPATIENT)
Dept: RHEUMATOLOGY | Facility: CLINIC | Age: 77
End: 2022-01-10

## 2022-01-10 ENCOUNTER — PATIENT OUTREACH (OUTPATIENT)
Dept: EMERGENCY MEDICINE | Facility: HOSPITAL | Age: 77
End: 2022-01-10
Payer: MEDICARE

## 2022-01-10 ENCOUNTER — OFFICE VISIT (OUTPATIENT)
Dept: RHEUMATOLOGY | Facility: CLINIC | Age: 77
End: 2022-01-10
Payer: MEDICARE

## 2022-01-10 ENCOUNTER — HOSPITAL ENCOUNTER (OUTPATIENT)
Dept: RADIOLOGY | Facility: HOSPITAL | Age: 77
Discharge: HOME | End: 2022-01-10
Attending: INTERNAL MEDICINE
Payer: MEDICARE

## 2022-01-10 VITALS — HEIGHT: 62 IN | WEIGHT: 129 LBS | BODY MASS INDEX: 23.74 KG/M2

## 2022-01-10 DIAGNOSIS — M70.62 GREATER TROCHANTERIC BURSITIS OF LEFT HIP: ICD-10-CM

## 2022-01-10 DIAGNOSIS — M79.10 MYALGIA: ICD-10-CM

## 2022-01-10 DIAGNOSIS — D86.9 SARCOIDOSIS: Primary | ICD-10-CM

## 2022-01-10 DIAGNOSIS — R06.02 SHORTNESS OF BREATH: ICD-10-CM

## 2022-01-10 DIAGNOSIS — D86.86 SARCOID ARTHROPATHY: ICD-10-CM

## 2022-01-10 DIAGNOSIS — D86.9 SARCOIDOSIS: ICD-10-CM

## 2022-01-10 DIAGNOSIS — D84.9 IMMUNOSUPPRESSION: ICD-10-CM

## 2022-01-10 DIAGNOSIS — R53.83 FATIGUE, UNSPECIFIED TYPE: ICD-10-CM

## 2022-01-10 DIAGNOSIS — G72.9 MYOPATHY: Primary | ICD-10-CM

## 2022-01-10 DIAGNOSIS — G72.9 MYOPATHY: ICD-10-CM

## 2022-01-10 PROCEDURE — 99214 PR OFFICE/OUTPT VISIT, EST, LEVL IV, 30-39 MIN: ICD-10-PCS | Mod: 25,S$GLB,, | Performed by: INTERNAL MEDICINE

## 2022-01-10 PROCEDURE — 1160F PR REVIEW ALL MEDS BY PRESCRIBER/CLIN PHARMACIST DOCUMENTED: ICD-10-PCS | Mod: CPTII,S$GLB,, | Performed by: INTERNAL MEDICINE

## 2022-01-10 PROCEDURE — 99999 PR PBB SHADOW E&M-EST. PATIENT-LVL IV: CPT | Mod: PBBFAC,,, | Performed by: INTERNAL MEDICINE

## 2022-01-10 PROCEDURE — 99214 OFFICE O/P EST MOD 30 MIN: CPT | Mod: 25,S$GLB,, | Performed by: INTERNAL MEDICINE

## 2022-01-10 PROCEDURE — 71046 X-RAY EXAM CHEST 2 VIEWS: CPT | Mod: TC,FY

## 2022-01-10 PROCEDURE — 3072F PR LOW RISK FOR RETINOPATHY: ICD-10-PCS | Mod: CPTII,S$GLB,, | Performed by: INTERNAL MEDICINE

## 2022-01-10 PROCEDURE — 99499 UNLISTED E&M SERVICE: CPT | Mod: S$GLB,,, | Performed by: INTERNAL MEDICINE

## 2022-01-10 PROCEDURE — 1101F PT FALLS ASSESS-DOCD LE1/YR: CPT | Mod: CPTII,S$GLB,, | Performed by: INTERNAL MEDICINE

## 2022-01-10 PROCEDURE — 1159F PR MEDICATION LIST DOCUMENTED IN MEDICAL RECORD: ICD-10-PCS | Mod: CPTII,S$GLB,, | Performed by: INTERNAL MEDICINE

## 2022-01-10 PROCEDURE — 1157F PR ADVANCE CARE PLAN OR EQUIV PRESENT IN MEDICAL RECORD: ICD-10-PCS | Mod: CPTII,S$GLB,, | Performed by: INTERNAL MEDICINE

## 2022-01-10 PROCEDURE — 1101F PR PT FALLS ASSESS DOC 0-1 FALLS W/OUT INJ PAST YR: ICD-10-PCS | Mod: CPTII,S$GLB,, | Performed by: INTERNAL MEDICINE

## 2022-01-10 PROCEDURE — 1125F PR PAIN SEVERITY QUANTIFIED, PAIN PRESENT: ICD-10-PCS | Mod: CPTII,S$GLB,, | Performed by: INTERNAL MEDICINE

## 2022-01-10 PROCEDURE — 1159F MED LIST DOCD IN RCRD: CPT | Mod: CPTII,S$GLB,, | Performed by: INTERNAL MEDICINE

## 2022-01-10 PROCEDURE — 1125F AMNT PAIN NOTED PAIN PRSNT: CPT | Mod: CPTII,S$GLB,, | Performed by: INTERNAL MEDICINE

## 2022-01-10 PROCEDURE — 20610 PR DRAIN/INJECT LARGE JOINT/BURSA: ICD-10-PCS | Mod: LT,S$GLB,, | Performed by: INTERNAL MEDICINE

## 2022-01-10 PROCEDURE — 3288F PR FALLS RISK ASSESSMENT DOCUMENTED: ICD-10-PCS | Mod: CPTII,S$GLB,, | Performed by: INTERNAL MEDICINE

## 2022-01-10 PROCEDURE — 71046 XR CHEST PA AND LATERAL: ICD-10-PCS | Mod: 26,,, | Performed by: RADIOLOGY

## 2022-01-10 PROCEDURE — 1160F RVW MEDS BY RX/DR IN RCRD: CPT | Mod: CPTII,S$GLB,, | Performed by: INTERNAL MEDICINE

## 2022-01-10 PROCEDURE — 20610 DRAIN/INJ JOINT/BURSA W/O US: CPT | Mod: LT,S$GLB,, | Performed by: INTERNAL MEDICINE

## 2022-01-10 PROCEDURE — 1157F ADVNC CARE PLAN IN RCRD: CPT | Mod: CPTII,S$GLB,, | Performed by: INTERNAL MEDICINE

## 2022-01-10 PROCEDURE — 99999 PR PBB SHADOW E&M-EST. PATIENT-LVL IV: ICD-10-PCS | Mod: PBBFAC,,, | Performed by: INTERNAL MEDICINE

## 2022-01-10 PROCEDURE — 3072F LOW RISK FOR RETINOPATHY: CPT | Mod: CPTII,S$GLB,, | Performed by: INTERNAL MEDICINE

## 2022-01-10 PROCEDURE — 3288F FALL RISK ASSESSMENT DOCD: CPT | Mod: CPTII,S$GLB,, | Performed by: INTERNAL MEDICINE

## 2022-01-10 PROCEDURE — 71046 X-RAY EXAM CHEST 2 VIEWS: CPT | Mod: 26,,, | Performed by: RADIOLOGY

## 2022-01-10 PROCEDURE — 99499 RISK ADDL DX/OHS AUDIT: ICD-10-PCS | Mod: S$GLB,,, | Performed by: INTERNAL MEDICINE

## 2022-01-10 RX ORDER — TIZANIDINE 2 MG/1
4 TABLET ORAL EVERY 8 HOURS PRN
Qty: 270 TABLET | Refills: 0 | Status: SHIPPED | OUTPATIENT
Start: 2022-01-10 | End: 2022-05-03 | Stop reason: SDUPTHER

## 2022-01-10 RX ORDER — TRIAMCINOLONE ACETONIDE 40 MG/ML
40 INJECTION, SUSPENSION INTRA-ARTICULAR; INTRAMUSCULAR ONCE
Status: COMPLETED | OUTPATIENT
Start: 2022-01-10 | End: 2022-01-10

## 2022-01-10 RX ADMIN — TRIAMCINOLONE ACETONIDE 40 MG: 40 INJECTION, SUSPENSION INTRA-ARTICULAR; INTRAMUSCULAR at 10:01

## 2022-01-10 NOTE — PROGRESS NOTES
Subjective:       Patient ID: Regino Lawrence is a 76 y.o. female.    Chief Complaint: Sarcoidosis    HPI:  Regino Lawrence is a 76 y.o. female with history of sarcoidosis with associated myopathy and   arthropathy. Sarcoidosis that was first manifested by muscle inflammation, low white   blood cell count, hair loss, skin involvement. She was treated in the   past with methotrexate and Plaquenil, both of which were ineffective.   Cellcept and Imuran caused some unknown side effect (she thinks it made her sick).   Colchicine was held due to low WBC.   Although methotrexate did not help in past it was retried and she felt it helped hair growth but did not help body aches.   She held MTX due to an URI but patient has not wanted restarted since then (2013).   Leflunomide was held due to elevated BP after less than a week of use.    Interval History:   For past 3 weeks has felt SOB.  Difficulty speaking due to shortness of breath.   She thought it was due to low blood count but blood count is normal.     July 2021 hospitalized after passing out at home and fracturing L4 .  She was thought to have a seizure. During hospitalization found to have bilateral PE and was placed on Eliquis.  She has been compliant.     Pain 9/10 ache in entire body especially left hip area.  Activity worsens pain.  Steroid helps some.  Primary gave steroid injection.    Currently on prednisone on 5 mg daily.      .      Review of Systems   Constitutional: Positive for fatigue. Negative for fever and unexpected weight change.   HENT: Negative for mouth sores and trouble swallowing.         Dry mouth   Eyes: Negative for redness.        Dry eyes   Respiratory: Negative.  Negative for cough and shortness of breath.    Cardiovascular: Negative.  Negative for chest pain.   Gastrointestinal: Negative.  Negative for constipation and diarrhea.   Endocrine: Negative.    Genitourinary: Negative.  Negative for dysuria and genital sores.   Musculoskeletal:  "Positive for arthralgias, back pain, joint swelling and myalgias.   Skin: Negative.  Negative for rash.   Allergic/Immunologic: Negative.    Neurological: Negative.  Negative for headaches.   Hematological: Negative.  Does not bruise/bleed easily.   Psychiatric/Behavioral: Negative.          Objective:   Ht 5' 2" (1.575 m)   Wt 58.5 kg (128 lb 15.5 oz)   LMP  (LMP Unknown)   BMI 23.59 kg/m²   Pulse Ox 98% on RA     Physical Exam   Constitutional: She is oriented to person, place, and time.   HENT:   Head: Normocephalic and atraumatic.   Eyes: Conjunctivae are normal.   Cardiovascular: Normal rate, regular rhythm and normal heart sounds.   Pulmonary/Chest: Effort normal and breath sounds normal.   Abdominal: Soft. Bowel sounds are normal.   Musculoskeletal:      Comments: 4/5 UE and LE proximal strength bilaterally unchanged  28 joint count: 0 tender and 0 swollen   Pain at left trochanteric bursitis    No pain on compression of MTPs        Neurological: She is alert and oriented to person, place, and time.   With cane   Skin: Skin is warm and dry.   Psychiatric: Mood and affect normal.     LABS    Component      Latest Ref Rng & Units 1/7/2022 10/29/2021 8/9/2021   WBC      3.90 - 12.70 K/uL 4.94     RBC      4.00 - 5.40 M/uL 3.38 (L)     Hemoglobin      12.0 - 16.0 g/dL 11.1 (L)     Hematocrit      37.0 - 48.5 % 34.8 (L)     MCV      82 - 98 fL 103 (H)     MCH      27.0 - 31.0 pg 32.8 (H)     MCHC      32.0 - 36.0 g/dL 31.9 (L)     RDW      11.5 - 14.5 % 13.4     Platelets      150 - 450 K/uL 325     MPV      9.2 - 12.9 fL 8.7 (L)     Gran # (ANC)      1.8 - 7.7 K/uL 2.8     Immature Grans (Abs)      0.00 - 0.04 K/uL 0.03     Iron      30 - 160 ug/dL  77    Transferrin      200 - 375 mg/dL  242    TIBC      250 - 450 ug/dL  358    Saturated Iron      20 - 50 %  22    CPK      20 - 180 U/L   140   Sed Rate      0 - 36 mm/Hr   5   CRP      0.0 - 8.2 mg/L   1.5   Ferritin      20.0 - 300.0 ng/mL  145           "   Assessment:       1.   Sarcoidosis. Manifested by myopathy and arthropathy.  Persistent joint pain and myalgias despite prednisone 5 mg. Unable to tolerate immunosuppressants/steroid sparing agents for various reasons. Still with all over body pain.   2.   Myalgia and myositis.  History of fibromyalgia   3.   Osteopenia. Took Fosamax for 5 years stopped 6/2013.  Awaiting patient decision on Prolia after she sees dentis.    4.   Fatigue     5.   Diabetes mellitus type 2 in nonobese.  Last  last night   6.           Neck pain. X-ray with degenerative changes. S/p laminectomy-cervical fusion C3-C7 11/16/2015 for cervical spinal stenosis.    7.           Back pain    8.           HTN.  Patient suspects BP elevated due to pain  9.           SOB.  When blood count low but blood count.   10.         Anemia.  On Procrit  11.         Seizures.  New onset July 2021  12.         Bilateral PE    Plan:       1. Labs and CXR.  2. Primary already gave steroid shot.  Continue prednisone 5 mg daily.  Risk of elevated BP and BG discussed.  3. [employer] stopped tramadol.  Now off hydrocodone/acetaminophen from primary doctor.  [employer] stopped Flexeril so switch to tizanidine.  Tizanidine stopped after seizure.   4. Following with nephrology  5. DXA with osteopenia of hip total and femoral neck. FRAX does not suggest treatment however if prednisone goes to 7.5 mg or more will consider Prolia (due renal insufficiency).  Information provided for patient to review.  She read information but did not see dentist to have teeth pulled.  6.  Follow with Dr. Conner regarding spine issues, fracture and pain in neck.  7.  Had COVID vaccine and booster        Procedure Note   I  have explained the risks, benefits, and alternatives of aspiration of the joint.  The patient voices understanding and questions have been answered.  The patient agrees to proceed.    The left trochanteric bursitis  was prepped with 2% chlorhexidine gluconate and 70%  isopropyl alcohol (ChloraPrep).  The area was numbed with topical refrigerant.  A 22 g needle was introduced into the space and no fluid was aspirated.  40 mg Kenalog and 2 cc lidocaine injected.  Hemostasis obtained.  The patient tolerated the procedure well.                 RTO in 4 months.    Patient seen face to face for 25 minutes and greater than 50% spent in counseling regarding joint pains,   management of sarcoidosis and pain.

## 2022-01-10 NOTE — PROGRESS NOTES
PreChartAnswers for HPI/ROS submitted by the patient on 1/9/2022  fever: No  eye redness: No  mouth sores: No  headaches: No  shortness of breath: Yes  chest pain: No  trouble swallowing: No  diarrhea: No  constipation: No  unexpected weight change: No  genital sore: No  dysuria: No  During the last 3 days, have you had a skin rash?: No  Bruises or bleeds easily: No  cough: No

## 2022-01-12 ENCOUNTER — OFFICE VISIT (OUTPATIENT)
Dept: OPHTHALMOLOGY | Facility: CLINIC | Age: 77
End: 2022-01-12
Attending: OPHTHALMOLOGY
Payer: MEDICARE

## 2022-01-12 DIAGNOSIS — D86.9 SARCOIDOSIS: Chronic | ICD-10-CM

## 2022-01-12 DIAGNOSIS — H10.13 ALLERGIC CONJUNCTIVITIS OF BOTH EYES: ICD-10-CM

## 2022-01-12 DIAGNOSIS — H04.123 DRY EYE SYNDROME, BILATERAL: ICD-10-CM

## 2022-01-12 DIAGNOSIS — H34.8120 HEMISPHERIC RETINAL VEIN OCCLUSION WITH MACULAR EDEMA OF LEFT EYE: Primary | ICD-10-CM

## 2022-01-12 DIAGNOSIS — H17.9 CORNEAL SCAR, RIGHT EYE: ICD-10-CM

## 2022-01-12 DIAGNOSIS — E11.9 DM TYPE 2 WITHOUT RETINOPATHY: ICD-10-CM

## 2022-01-12 DIAGNOSIS — G72.9 MYOPATHY: ICD-10-CM

## 2022-01-12 PROCEDURE — 3288F PR FALLS RISK ASSESSMENT DOCUMENTED: ICD-10-PCS | Mod: CPTII,S$GLB,, | Performed by: OPHTHALMOLOGY

## 2022-01-12 PROCEDURE — 99999 PR PBB SHADOW E&M-EST. PATIENT-LVL III: ICD-10-PCS | Mod: PBBFAC,,, | Performed by: OPHTHALMOLOGY

## 2022-01-12 PROCEDURE — 1160F RVW MEDS BY RX/DR IN RCRD: CPT | Mod: CPTII,S$GLB,, | Performed by: OPHTHALMOLOGY

## 2022-01-12 PROCEDURE — 1159F PR MEDICATION LIST DOCUMENTED IN MEDICAL RECORD: ICD-10-PCS | Mod: CPTII,S$GLB,, | Performed by: OPHTHALMOLOGY

## 2022-01-12 PROCEDURE — 92014 COMPRE OPH EXAM EST PT 1/>: CPT | Mod: S$GLB,,, | Performed by: OPHTHALMOLOGY

## 2022-01-12 PROCEDURE — 92134 POSTERIOR SEGMENT OCT RETINA (OCULAR COHERENCE TOMOGRAPHY)-BOTH EYES: ICD-10-PCS | Mod: S$GLB,,, | Performed by: OPHTHALMOLOGY

## 2022-01-12 PROCEDURE — 2023F PR DILATED RETINAL EXAM W/O EVID OF RETINOPATHY: ICD-10-PCS | Mod: CPTII,S$GLB,, | Performed by: OPHTHALMOLOGY

## 2022-01-12 PROCEDURE — 92014 PR EYE EXAM, EST PATIENT,COMPREHESV: ICD-10-PCS | Mod: S$GLB,,, | Performed by: OPHTHALMOLOGY

## 2022-01-12 PROCEDURE — 3288F FALL RISK ASSESSMENT DOCD: CPT | Mod: CPTII,S$GLB,, | Performed by: OPHTHALMOLOGY

## 2022-01-12 PROCEDURE — 1160F PR REVIEW ALL MEDS BY PRESCRIBER/CLIN PHARMACIST DOCUMENTED: ICD-10-PCS | Mod: CPTII,S$GLB,, | Performed by: OPHTHALMOLOGY

## 2022-01-12 PROCEDURE — 92134 CPTRZ OPH DX IMG PST SGM RTA: CPT | Mod: S$GLB,,, | Performed by: OPHTHALMOLOGY

## 2022-01-12 PROCEDURE — 2023F DILAT RTA XM W/O RTNOPTHY: CPT | Mod: CPTII,S$GLB,, | Performed by: OPHTHALMOLOGY

## 2022-01-12 PROCEDURE — 1159F MED LIST DOCD IN RCRD: CPT | Mod: CPTII,S$GLB,, | Performed by: OPHTHALMOLOGY

## 2022-01-12 PROCEDURE — 1126F PR PAIN SEVERITY QUANTIFIED, NO PAIN PRESENT: ICD-10-PCS | Mod: CPTII,S$GLB,, | Performed by: OPHTHALMOLOGY

## 2022-01-12 PROCEDURE — 1100F PR PT FALLS ASSESS DOC 2+ FALLS/FALL W/INJURY/YR: ICD-10-PCS | Mod: CPTII,S$GLB,, | Performed by: OPHTHALMOLOGY

## 2022-01-12 PROCEDURE — 1126F AMNT PAIN NOTED NONE PRSNT: CPT | Mod: CPTII,S$GLB,, | Performed by: OPHTHALMOLOGY

## 2022-01-12 PROCEDURE — 1157F PR ADVANCE CARE PLAN OR EQUIV PRESENT IN MEDICAL RECORD: ICD-10-PCS | Mod: CPTII,S$GLB,, | Performed by: OPHTHALMOLOGY

## 2022-01-12 PROCEDURE — 99999 PR PBB SHADOW E&M-EST. PATIENT-LVL III: CPT | Mod: PBBFAC,,, | Performed by: OPHTHALMOLOGY

## 2022-01-12 PROCEDURE — 1157F ADVNC CARE PLAN IN RCRD: CPT | Mod: CPTII,S$GLB,, | Performed by: OPHTHALMOLOGY

## 2022-01-12 PROCEDURE — 1100F PTFALLS ASSESS-DOCD GE2>/YR: CPT | Mod: CPTII,S$GLB,, | Performed by: OPHTHALMOLOGY

## 2022-01-12 NOTE — PROGRESS NOTES
Subjective:       Patient ID: Regino Lawrence is a 76 y.o. female      Chief Complaint   Patient presents with    Concerns About Ocular Health     History of Present Illness  HPI     DLS: 08/23/2021 Dr. Baker     75 Y/o female is here for F/U DFE OCT   Vision is blurry OU  Pt denies pain and discomfort     No tearing   No itching   No burning   No flashes   No floaters     Eye med: Pataday OU bid   Ats prn       Last edited by Hemanth Baker MD on 1/12/2022 11:25 AM. (History)        Imaging:    See report    Assessment/Plan:     1. Hemispheric retinal vein occlusion with macular edema of left eye  Still improving min eccentric ME.  Excellent Va  Last inj Av 6/2020  CV risk factor control  Observe    - Posterior Segment OCT Retina-Both eyes  - Posterior Segment OCT Retina-Both eyes; Future    2. Dry eye syndrome, bilateral  Cont ATs    3. Corneal scar, right eye  observe    4. DM type 2 without retinopathy  BS control    5. Allergic conjunctivitis of both eyes  Symptoms controlled with Pataday  Cont Pataday 1/1    Follow up in about 6 months (around 7/12/2022), or if symptoms worsen or fail to improve, for Dilated examination, OCT Mac.   If stable then can do comprehensive f/u only and retina PRN

## 2022-01-13 RX ORDER — FOLIC ACID 1 MG/1
1000 TABLET ORAL DAILY
Qty: 90 TABLET | Refills: 1 | Status: SHIPPED | OUTPATIENT
Start: 2022-01-13 | End: 2022-07-13

## 2022-01-20 ENCOUNTER — PATIENT OUTREACH (OUTPATIENT)
Dept: EMERGENCY MEDICINE | Facility: HOSPITAL | Age: 77
End: 2022-01-20
Payer: MEDICARE

## 2022-01-21 ENCOUNTER — INFUSION (OUTPATIENT)
Dept: INFUSION THERAPY | Facility: HOSPITAL | Age: 77
End: 2022-01-21
Attending: INTERNAL MEDICINE
Payer: MEDICARE

## 2022-01-21 VITALS
RESPIRATION RATE: 16 BRPM | SYSTOLIC BLOOD PRESSURE: 168 MMHG | HEART RATE: 89 BPM | TEMPERATURE: 98 F | OXYGEN SATURATION: 99 % | DIASTOLIC BLOOD PRESSURE: 84 MMHG

## 2022-01-21 DIAGNOSIS — Z53.1 REFUSAL OF BLOOD TRANSFUSIONS AS PATIENT IS JEHOVAH'S WITNESS: ICD-10-CM

## 2022-01-21 DIAGNOSIS — D63.1 ANEMIA IN CHRONIC KIDNEY DISEASE, UNSPECIFIED CKD STAGE: Primary | ICD-10-CM

## 2022-01-21 DIAGNOSIS — N18.9 ANEMIA IN CHRONIC KIDNEY DISEASE, UNSPECIFIED CKD STAGE: Primary | ICD-10-CM

## 2022-01-21 DIAGNOSIS — D50.9 IRON DEFICIENCY ANEMIA, UNSPECIFIED IRON DEFICIENCY ANEMIA TYPE: ICD-10-CM

## 2022-01-21 PROCEDURE — 63600175 PHARM REV CODE 636 W HCPCS: Mod: JG,EC | Performed by: INTERNAL MEDICINE

## 2022-01-21 PROCEDURE — 96372 THER/PROPH/DIAG INJ SC/IM: CPT

## 2022-01-21 RX ADMIN — EPOETIN ALFA-EPBX 20000 UNITS: 20000 INJECTION, SOLUTION INTRAVENOUS; SUBCUTANEOUS at 11:01

## 2022-01-21 NOTE — PLAN OF CARE
Patient arrived to unit following AM lab apt for biweekly epoetin alpha. Hgb 10.6 today (11.1 on 1/7). Patient received 20,000u retacrit in right upper arm. Tolerated injection well. Discharge instructions and follow up appointments available via [facility]. She will return on 2/4 for routine labs and epo, pending hgb. Patient verbalized understanding and discharged from unit via personal mobile chair, accompanied by daughter. Pt in NAD at time of dc.

## 2022-01-25 ENCOUNTER — PATIENT OUTREACH (OUTPATIENT)
Dept: EMERGENCY MEDICINE | Facility: HOSPITAL | Age: 77
End: 2022-01-25
Payer: MEDICARE

## 2022-02-04 ENCOUNTER — INFUSION (OUTPATIENT)
Dept: INFUSION THERAPY | Facility: HOSPITAL | Age: 77
End: 2022-02-04
Attending: INTERNAL MEDICINE
Payer: MEDICARE

## 2022-02-04 VITALS
HEART RATE: 85 BPM | RESPIRATION RATE: 16 BRPM | OXYGEN SATURATION: 100 % | DIASTOLIC BLOOD PRESSURE: 94 MMHG | SYSTOLIC BLOOD PRESSURE: 162 MMHG

## 2022-02-04 DIAGNOSIS — D63.1 ANEMIA IN CHRONIC KIDNEY DISEASE, UNSPECIFIED CKD STAGE: Primary | ICD-10-CM

## 2022-02-04 DIAGNOSIS — Z53.1 REFUSAL OF BLOOD TRANSFUSIONS AS PATIENT IS JEHOVAH'S WITNESS: ICD-10-CM

## 2022-02-04 DIAGNOSIS — D50.9 IRON DEFICIENCY ANEMIA, UNSPECIFIED IRON DEFICIENCY ANEMIA TYPE: ICD-10-CM

## 2022-02-04 DIAGNOSIS — N18.9 ANEMIA IN CHRONIC KIDNEY DISEASE, UNSPECIFIED CKD STAGE: Primary | ICD-10-CM

## 2022-02-04 PROCEDURE — 63600175 PHARM REV CODE 636 W HCPCS: Mod: JG | Performed by: INTERNAL MEDICINE

## 2022-02-04 PROCEDURE — 96372 THER/PROPH/DIAG INJ SC/IM: CPT

## 2022-02-04 RX ADMIN — EPOETIN ALFA-EPBX 20000 UNITS: 20000 INJECTION, SOLUTION INTRAVENOUS; SUBCUTANEOUS at 11:02

## 2022-02-04 NOTE — PLAN OF CARE
Patient arrived to unit following AM lab apt for biweekly epoetin alpha. Hgb 10.9 today (10.6 on 1/21). BP 180s/90s upon arrival. Patient took rx antihypertensive and rechecked 20 minutes later. /94. Patient received 20,000u retacrit in left upper arm. Tolerated injection well. Discharge instructions and follow up appointments available via [facility]. She will return on 2/18 for routine labs and epo, pending hgb. Patient verbalized understanding and discharged from unit via personal mobile chair, accompanied by daughter. Pt in NAD at time of dc.

## 2022-02-16 ENCOUNTER — TELEPHONE (OUTPATIENT)
Dept: FAMILY MEDICINE | Facility: CLINIC | Age: 77
End: 2022-02-16
Payer: MEDICARE

## 2022-02-18 ENCOUNTER — INFUSION (OUTPATIENT)
Dept: INFUSION THERAPY | Facility: HOSPITAL | Age: 77
End: 2022-02-18
Attending: INTERNAL MEDICINE
Payer: MEDICARE

## 2022-02-18 VITALS
OXYGEN SATURATION: 96 % | RESPIRATION RATE: 16 BRPM | SYSTOLIC BLOOD PRESSURE: 138 MMHG | TEMPERATURE: 98 F | DIASTOLIC BLOOD PRESSURE: 74 MMHG | HEART RATE: 90 BPM

## 2022-02-18 DIAGNOSIS — N18.9 ANEMIA IN CHRONIC KIDNEY DISEASE, UNSPECIFIED CKD STAGE: Primary | ICD-10-CM

## 2022-02-18 DIAGNOSIS — D50.9 IRON DEFICIENCY ANEMIA, UNSPECIFIED IRON DEFICIENCY ANEMIA TYPE: ICD-10-CM

## 2022-02-18 DIAGNOSIS — D63.1 ANEMIA IN CHRONIC KIDNEY DISEASE, UNSPECIFIED CKD STAGE: Primary | ICD-10-CM

## 2022-02-18 DIAGNOSIS — Z53.1 REFUSAL OF BLOOD TRANSFUSIONS AS PATIENT IS JEHOVAH'S WITNESS: ICD-10-CM

## 2022-02-18 PROCEDURE — 96372 THER/PROPH/DIAG INJ SC/IM: CPT

## 2022-02-18 PROCEDURE — 63600175 PHARM REV CODE 636 W HCPCS: Mod: JG | Performed by: INTERNAL MEDICINE

## 2022-02-18 RX ADMIN — EPOETIN ALFA-EPBX 20000 UNITS: 20000 INJECTION, SOLUTION INTRAVENOUS; SUBCUTANEOUS at 11:02

## 2022-02-18 NOTE — PLAN OF CARE
Patient arrived to unit following AM lab apt for biweekly epoetin alpha. Hgb 10.5 today (10.9 on 2/4). VSS. Patient received 20,000u retacrit in right upper arm. Tolerated injection well. Discharge instructions and follow up appointments available via [facility]. She will return on 3/4 for routine labs and epo, pending hgb. Patient verbalized understanding and discharged from unit via personal mobile chair, accompanied by daughter. Pt in NAD at time of dc.

## 2022-03-04 ENCOUNTER — INFUSION (OUTPATIENT)
Dept: INFUSION THERAPY | Facility: HOSPITAL | Age: 77
End: 2022-03-04
Attending: INTERNAL MEDICINE
Payer: MEDICARE

## 2022-03-04 VITALS
SYSTOLIC BLOOD PRESSURE: 144 MMHG | RESPIRATION RATE: 16 BRPM | HEART RATE: 94 BPM | TEMPERATURE: 98 F | DIASTOLIC BLOOD PRESSURE: 76 MMHG | OXYGEN SATURATION: 100 %

## 2022-03-04 DIAGNOSIS — N18.9 ANEMIA IN CHRONIC KIDNEY DISEASE, UNSPECIFIED CKD STAGE: Primary | ICD-10-CM

## 2022-03-04 DIAGNOSIS — D50.9 IRON DEFICIENCY ANEMIA, UNSPECIFIED IRON DEFICIENCY ANEMIA TYPE: ICD-10-CM

## 2022-03-04 DIAGNOSIS — D63.1 ANEMIA IN CHRONIC KIDNEY DISEASE, UNSPECIFIED CKD STAGE: Primary | ICD-10-CM

## 2022-03-04 DIAGNOSIS — Z53.1 REFUSAL OF BLOOD TRANSFUSIONS AS PATIENT IS JEHOVAH'S WITNESS: ICD-10-CM

## 2022-03-04 PROCEDURE — 96372 THER/PROPH/DIAG INJ SC/IM: CPT

## 2022-03-04 PROCEDURE — 63600175 PHARM REV CODE 636 W HCPCS: Mod: JG | Performed by: INTERNAL MEDICINE

## 2022-03-04 RX ADMIN — EPOETIN ALFA-EPBX 20000 UNITS: 20000 INJECTION, SOLUTION INTRAVENOUS; SUBCUTANEOUS at 12:03

## 2022-03-04 NOTE — PLAN OF CARE
Pt arrived to unit. VSS. No new issues. Tolerated injection. Pt has next appts. Pt discharged from unit in motorized scooter. No distress noted.

## 2022-03-09 ENCOUNTER — OFFICE VISIT (OUTPATIENT)
Dept: HEMATOLOGY/ONCOLOGY | Facility: CLINIC | Age: 77
End: 2022-03-09
Payer: MEDICARE

## 2022-03-09 VITALS
TEMPERATURE: 98 F | HEIGHT: 62 IN | SYSTOLIC BLOOD PRESSURE: 148 MMHG | OXYGEN SATURATION: 99 % | HEART RATE: 93 BPM | BODY MASS INDEX: 25.8 KG/M2 | WEIGHT: 140.19 LBS | DIASTOLIC BLOOD PRESSURE: 73 MMHG

## 2022-03-09 DIAGNOSIS — G89.4 CHRONIC PAIN SYNDROME: ICD-10-CM

## 2022-03-09 DIAGNOSIS — N18.9 CHRONIC KIDNEY DISEASE, UNSPECIFIED CKD STAGE: ICD-10-CM

## 2022-03-09 DIAGNOSIS — D63.1 ANEMIA IN CHRONIC KIDNEY DISEASE, UNSPECIFIED CKD STAGE: Primary | ICD-10-CM

## 2022-03-09 DIAGNOSIS — N18.9 ANEMIA IN CHRONIC KIDNEY DISEASE, UNSPECIFIED CKD STAGE: Primary | ICD-10-CM

## 2022-03-09 PROCEDURE — 3078F PR MOST RECENT DIASTOLIC BLOOD PRESSURE < 80 MM HG: ICD-10-PCS | Mod: CPTII,S$GLB,, | Performed by: INTERNAL MEDICINE

## 2022-03-09 PROCEDURE — 3077F PR MOST RECENT SYSTOLIC BLOOD PRESSURE >= 140 MM HG: ICD-10-PCS | Mod: CPTII,S$GLB,, | Performed by: INTERNAL MEDICINE

## 2022-03-09 PROCEDURE — 3288F PR FALLS RISK ASSESSMENT DOCUMENTED: ICD-10-PCS | Mod: CPTII,S$GLB,, | Performed by: INTERNAL MEDICINE

## 2022-03-09 PROCEDURE — 1159F PR MEDICATION LIST DOCUMENTED IN MEDICAL RECORD: ICD-10-PCS | Mod: CPTII,S$GLB,, | Performed by: INTERNAL MEDICINE

## 2022-03-09 PROCEDURE — 1125F PR PAIN SEVERITY QUANTIFIED, PAIN PRESENT: ICD-10-PCS | Mod: CPTII,S$GLB,, | Performed by: INTERNAL MEDICINE

## 2022-03-09 PROCEDURE — 99214 OFFICE O/P EST MOD 30 MIN: CPT | Mod: S$GLB,,, | Performed by: INTERNAL MEDICINE

## 2022-03-09 PROCEDURE — 3072F LOW RISK FOR RETINOPATHY: CPT | Mod: CPTII,S$GLB,, | Performed by: INTERNAL MEDICINE

## 2022-03-09 PROCEDURE — 1101F PT FALLS ASSESS-DOCD LE1/YR: CPT | Mod: CPTII,S$GLB,, | Performed by: INTERNAL MEDICINE

## 2022-03-09 PROCEDURE — 1125F AMNT PAIN NOTED PAIN PRSNT: CPT | Mod: CPTII,S$GLB,, | Performed by: INTERNAL MEDICINE

## 2022-03-09 PROCEDURE — 1159F MED LIST DOCD IN RCRD: CPT | Mod: CPTII,S$GLB,, | Performed by: INTERNAL MEDICINE

## 2022-03-09 PROCEDURE — 99214 PR OFFICE/OUTPT VISIT, EST, LEVL IV, 30-39 MIN: ICD-10-PCS | Mod: S$GLB,,, | Performed by: INTERNAL MEDICINE

## 2022-03-09 PROCEDURE — 3072F PR LOW RISK FOR RETINOPATHY: ICD-10-PCS | Mod: CPTII,S$GLB,, | Performed by: INTERNAL MEDICINE

## 2022-03-09 PROCEDURE — 99999 PR PBB SHADOW E&M-EST. PATIENT-LVL V: CPT | Mod: PBBFAC,,, | Performed by: INTERNAL MEDICINE

## 2022-03-09 PROCEDURE — 3078F DIAST BP <80 MM HG: CPT | Mod: CPTII,S$GLB,, | Performed by: INTERNAL MEDICINE

## 2022-03-09 PROCEDURE — 1101F PR PT FALLS ASSESS DOC 0-1 FALLS W/OUT INJ PAST YR: ICD-10-PCS | Mod: CPTII,S$GLB,, | Performed by: INTERNAL MEDICINE

## 2022-03-09 PROCEDURE — 1157F PR ADVANCE CARE PLAN OR EQUIV PRESENT IN MEDICAL RECORD: ICD-10-PCS | Mod: CPTII,S$GLB,, | Performed by: INTERNAL MEDICINE

## 2022-03-09 PROCEDURE — 1157F ADVNC CARE PLAN IN RCRD: CPT | Mod: CPTII,S$GLB,, | Performed by: INTERNAL MEDICINE

## 2022-03-09 PROCEDURE — 99999 PR PBB SHADOW E&M-EST. PATIENT-LVL V: ICD-10-PCS | Mod: PBBFAC,,, | Performed by: INTERNAL MEDICINE

## 2022-03-09 PROCEDURE — 3288F FALL RISK ASSESSMENT DOCD: CPT | Mod: CPTII,S$GLB,, | Performed by: INTERNAL MEDICINE

## 2022-03-09 PROCEDURE — 3077F SYST BP >= 140 MM HG: CPT | Mod: CPTII,S$GLB,, | Performed by: INTERNAL MEDICINE

## 2022-03-09 NOTE — PROGRESS NOTES
Subjective:        Patient ID: Regino Lawrence is a 76 y.o. female.    Chief Complaint: Follow-up anemia      Diagnosis: Anemia in CKD  Patient is a [employer]  .  Prior Hx;  The patient is seen today for f/u  chronic anemia in CKD undergoing EPO injections.The patient reports that she has been diagnosed with JOLLY in the past.  She has been on oral iron supplementation therapy, but could not tolerate or did not respond, she is uncertainShe also has  undergone intermittent IV iron therapy.  She is followed by GI and has undergone a colonoscopy earlier this year and was diagnosed with hemorrhoids for which she underwent banding procedure.  No melena, hematochezia,change in bowel habits.  She has also been diagnosed with B12 deficiency in the past, but reports she did not respond to B12 injections. No history of blood transfusions.  She is a [employer].  She reports that she remembers getting injections in the 1970s when her blood count was low.   She is followed by Rheumatology for history of sarcoidosis with associated myopathy and arthropathy. She has been treated in the  past with methotrexate and Plaquenil, both of which were ineffectiveCellcept and imuran caused some unknown side effect. She is followed by PCP for DM        Interval Hx;  She continues with Chronic diffuse arthralgias  Followed by pain management and Rheumatology   In [facility]  She is undergoing epo inj q 2wks  Hb  10.5g/dL on 3/4/22   No CP/cough  Chronic Mild fatigue  Mild FAUSTIN-stable  No melena, hematochezia or change in bowel habits  She also has  undergone intermittent IV iron therapy  She also has history of cervical spinal stenosis s/p posterior C3-C7 laminectomy and fusion on 11/16/15 by Dr. Conner.  She has received COVD vaccination           PAST MEDICAL HISTORY:  Acid reflux, alopecia, anemia, anxiety disorder, chronic  kidney disease, depression, diabetes mellitus type 2, hyperlipidemia,  hypertension,  "hypothyroidism, osteoporosis, sarcoidosis.    PAST SURGICAL HISTORY:  Cholecystectomy, , tubal ligation, carpal tunnel release, cataracts.    FAMILY HISTORY: Unremarkable for cancer. Significant for HTN.       Review of Systems   Constitutional: Positive for fatigue (chronic, mild). Negative for activity change and appetite change.   HENT: Negative for hearing loss and nosebleeds.    Eyes: Negative for visual disturbance.   Respiratory: Positive for shortness of breath (Mild FAUSTIN). Negative for cough.    Cardiovascular: Negative for chest pain and leg swelling.   Gastrointestinal: Negative for abdominal pain, constipation, diarrhea and nausea.   Genitourinary: Negative for flank pain and urgency.   Musculoskeletal: Positive for arthralgias and back pain. Negative for gait problem and joint swelling.   Skin: Negative for rash.        No petechiae, ecchymoses   Neurological: Negative for light-headedness and headaches.   Hematological: Negative for adenopathy. Does not bruise/bleed easily.       Objective:       Vitals:    22 1443   BP: (!) 148/73   BP Location: Left arm   Patient Position: Sitting   Pulse: 93   Temp: 97.5 °F (36.4 °C)   TempSrc: Oral   SpO2: 99%   Weight: 63.6 kg (140 lb 3.4 oz)   Height: 5' 2" (1.575 m)         .Physical Exam   Constitutional: She is oriented to person, place, and time. She appears well-developed and well-nourished.   HENT:   Head: Normocephalic.   Eyes: Conjunctivae and lids are normal.No scleral icterus.   Neck: Normal range of motion. Neck supple. No thyromegaly present.   Cardiovascular: Normal rate, regular rhythm and normal heart sounds.    No murmur heard.  Pulmonary/Chest: Breath sounds normal. She has no wheezes. She has no rales.   Abdominal: Soft. Bowel sounds are normal. There is no tenderness. There is no rebound and no guarding.   Musculoskeletal: Ambulates w/assistance of walker   Neurological: She is alert and oriented to person, place, and time. No " cranial nerve deficit.  Skin: Skin is warm and dry. No ecchymosis, no petechiae and no rash noted. No erythema.   Psychiatric: She has a normal mood and affect.               Lab Results   Component Value Date    WBC 6.04 03/04/2022    HGB 10.5 (L) 03/04/2022    HCT 32.6 (L) 03/04/2022     (H) 03/04/2022     03/04/2022     Lab Results   Component Value Date    IRON 77 10/29/2021    TIBC 358 10/29/2021    FERRITIN 145 10/29/2021     SPEP-nl      CT renal 3/6/2017   IMPRESSION:  1.  No renal, ureteral or bladder calculi.  No hydronephrosis or ureterectasis.  2.  Poorly distended bladder.  Mild bladder wall prominence.  Mild cystitis cannot be   excluded.  3.  Moderate constipation.  Normal appendix      Lab Results   Component Value Date    IRON 77 10/29/2021    TIBC 358 10/29/2021    FERRITIN 145 10/29/2021     Lab Results   Component Value Date    WBC 6.04 03/04/2022    HGB 10.5 (L) 03/04/2022    HCT 32.6 (L) 03/04/2022     (H) 03/04/2022     03/04/2022         Assessment:       1. Anemia in chronic kidney disease, unspecified CKD stage    2. Chronic kidney disease, unspecified CKD stage    3. Patient is Mandaeism    4. Chronic pain syndrome        Plan:   1-3   Pt clinically stable  Pt is a [employer] and declines/not interested in blood and blood products due to Evangelical beliefs  She is undergoing epo inj q 2wks  Hb 10.5 g/dL  Ferritin parameters wnl   Pt followed by Nephrology . It has been determined early CKD stage III, suspect due to age-related renal nephron loss, along with possible diabetic nephropathy v. hypertensive nephrosclerosis  CBC q2wks STANDING  Cont EPO  inj q 2wks( pending lab parameters)  Continue periodic monitoring of Fe studies    4. Followed by pain mgmt    Follow-up with PCP for med mgmt    CBC q2wks STANDING ( on FRIDAYS in AM)  Cont EPO  inj q 2wks( pending lab parameters) on FRI  Ferrtin, TIBC , Fe and CBC prior to fu 3mos      Advance Care  Planning     Power of   I previously initiated the process of advance care planning today and explained the importance of this process to the patient.  I introduced the concept of advance directives to the patient, as well. Then the patient received detailed information about the importance of designating a Health Care Power of  (HCPOA). She was also instructed to communicate with this person about their wishes for future healthcare, should she become sick and lose decision-making capacity. The patient has  previously appointed a HCPOA. Pt completed in 2015           CC: Micaela Mendoza M.D.

## 2022-03-09 NOTE — Clinical Note
CBC q2wks STANDING ( on FRIDAYS in AM) Cont EPO  inj q 2wks( pending lab parameters) on FRI Ferrtin, TIBC , Fe and CBC prior to fu 3mos ADD FERRITIN, TIBC and FE to next lab draw

## 2022-03-18 ENCOUNTER — INFUSION (OUTPATIENT)
Dept: INFUSION THERAPY | Facility: HOSPITAL | Age: 77
End: 2022-03-18
Attending: INTERNAL MEDICINE
Payer: MEDICARE

## 2022-03-18 ENCOUNTER — LAB VISIT (OUTPATIENT)
Dept: LAB | Facility: HOSPITAL | Age: 77
End: 2022-03-18
Attending: INTERNAL MEDICINE
Payer: MEDICARE

## 2022-03-18 VITALS
HEART RATE: 102 BPM | RESPIRATION RATE: 16 BRPM | SYSTOLIC BLOOD PRESSURE: 133 MMHG | DIASTOLIC BLOOD PRESSURE: 68 MMHG | OXYGEN SATURATION: 96 % | TEMPERATURE: 98 F

## 2022-03-18 DIAGNOSIS — D64.9 ANEMIA: Primary | ICD-10-CM

## 2022-03-18 DIAGNOSIS — D63.1 ANEMIA IN CHRONIC KIDNEY DISEASE, UNSPECIFIED CKD STAGE: Primary | ICD-10-CM

## 2022-03-18 DIAGNOSIS — N18.9 ANEMIA IN CHRONIC KIDNEY DISEASE, UNSPECIFIED CKD STAGE: Primary | ICD-10-CM

## 2022-03-18 DIAGNOSIS — D50.9 IRON DEFICIENCY ANEMIA, UNSPECIFIED IRON DEFICIENCY ANEMIA TYPE: ICD-10-CM

## 2022-03-18 DIAGNOSIS — Z53.1 REFUSAL OF BLOOD TRANSFUSIONS AS PATIENT IS JEHOVAH'S WITNESS: ICD-10-CM

## 2022-03-18 DIAGNOSIS — D63.1 ANEMIA IN CHRONIC KIDNEY DISEASE, UNSPECIFIED CKD STAGE: ICD-10-CM

## 2022-03-18 DIAGNOSIS — N18.9 ANEMIA IN CHRONIC KIDNEY DISEASE, UNSPECIFIED CKD STAGE: ICD-10-CM

## 2022-03-18 LAB
BASOPHILS # BLD AUTO: 0.03 K/UL (ref 0–0.2)
BASOPHILS NFR BLD: 0.5 % (ref 0–1.9)
DIFFERENTIAL METHOD: ABNORMAL
EOSINOPHIL # BLD AUTO: 0.1 K/UL (ref 0–0.5)
EOSINOPHIL NFR BLD: 1 % (ref 0–8)
ERYTHROCYTE [DISTWIDTH] IN BLOOD BY AUTOMATED COUNT: 13.1 % (ref 11.5–14.5)
FERRITIN SERPL-MCNC: 132 NG/ML (ref 20–300)
HCT VFR BLD AUTO: 33 % (ref 37–48.5)
HGB BLD-MCNC: 10.6 G/DL (ref 12–16)
IMM GRANULOCYTES # BLD AUTO: 0.1 K/UL (ref 0–0.04)
IMM GRANULOCYTES NFR BLD AUTO: 1.7 % (ref 0–0.5)
IRON SERPL-MCNC: 84 UG/DL (ref 30–160)
LYMPHOCYTES # BLD AUTO: 1.6 K/UL (ref 1–4.8)
LYMPHOCYTES NFR BLD: 27.5 % (ref 18–48)
MCH RBC QN AUTO: 33.7 PG (ref 27–31)
MCHC RBC AUTO-ENTMCNC: 32.1 G/DL (ref 32–36)
MCV RBC AUTO: 105 FL (ref 82–98)
MONOCYTES # BLD AUTO: 0.7 K/UL (ref 0.3–1)
MONOCYTES NFR BLD: 12.2 % (ref 4–15)
NEUTROPHILS # BLD AUTO: 3.4 K/UL (ref 1.8–7.7)
NEUTROPHILS NFR BLD: 57.1 % (ref 38–73)
NRBC BLD-RTO: 0 /100 WBC
PLATELET # BLD AUTO: 308 K/UL (ref 150–450)
PMV BLD AUTO: 8.7 FL (ref 9.2–12.9)
RBC # BLD AUTO: 3.15 M/UL (ref 4–5.4)
SATURATED IRON: 20 % (ref 20–50)
TOTAL IRON BINDING CAPACITY: 417 UG/DL (ref 250–450)
TRANSFERRIN SERPL-MCNC: 282 MG/DL (ref 200–375)
WBC # BLD AUTO: 5.92 K/UL (ref 3.9–12.7)

## 2022-03-18 PROCEDURE — 63600175 PHARM REV CODE 636 W HCPCS: Mod: JG,EC | Performed by: INTERNAL MEDICINE

## 2022-03-18 PROCEDURE — 82728 ASSAY OF FERRITIN: CPT | Performed by: INTERNAL MEDICINE

## 2022-03-18 PROCEDURE — 36415 COLL VENOUS BLD VENIPUNCTURE: CPT | Performed by: INTERNAL MEDICINE

## 2022-03-18 PROCEDURE — 96372 THER/PROPH/DIAG INJ SC/IM: CPT

## 2022-03-18 PROCEDURE — 84466 ASSAY OF TRANSFERRIN: CPT | Performed by: INTERNAL MEDICINE

## 2022-03-18 PROCEDURE — 85025 COMPLETE CBC W/AUTO DIFF WBC: CPT | Performed by: INTERNAL MEDICINE

## 2022-03-18 RX ADMIN — EPOETIN ALFA-EPBX 20000 UNITS: 20000 INJECTION, SOLUTION INTRAVENOUS; SUBCUTANEOUS at 12:03

## 2022-03-18 NOTE — PLAN OF CARE
Patient arrived to unit following AM lab apt for biweekly epoetin alpha. Hgb 10.6 today (10.5 on 3/4). VSS. Patient received 20,000u retacrit in right upper arm. Tolerated injection well. Discharge instructions and follow up appointments available via [facility]. She will return on 4/1 for routine labs and epo, pending hgb. Patient verbalized understanding and discharged from unit via personal mobile chair, accompanied by daughter. Pt in NAD at time of dc.

## 2022-04-01 ENCOUNTER — LAB VISIT (OUTPATIENT)
Dept: LAB | Facility: HOSPITAL | Age: 77
End: 2022-04-01
Attending: INTERNAL MEDICINE
Payer: MEDICARE

## 2022-04-01 ENCOUNTER — DOCUMENTATION ONLY (OUTPATIENT)
Dept: INFUSION THERAPY | Facility: HOSPITAL | Age: 77
End: 2022-04-01
Payer: MEDICARE

## 2022-04-01 DIAGNOSIS — D64.9 ANEMIA: ICD-10-CM

## 2022-04-01 LAB
BASOPHILS # BLD AUTO: 0.03 K/UL (ref 0–0.2)
BASOPHILS NFR BLD: 0.6 % (ref 0–1.9)
DIFFERENTIAL METHOD: ABNORMAL
EOSINOPHIL # BLD AUTO: 0.1 K/UL (ref 0–0.5)
EOSINOPHIL NFR BLD: 1.6 % (ref 0–8)
ERYTHROCYTE [DISTWIDTH] IN BLOOD BY AUTOMATED COUNT: 12.9 % (ref 11.5–14.5)
HCT VFR BLD AUTO: 34.7 % (ref 37–48.5)
HGB BLD-MCNC: 11.3 G/DL (ref 12–16)
IMM GRANULOCYTES # BLD AUTO: 0.06 K/UL (ref 0–0.04)
IMM GRANULOCYTES NFR BLD AUTO: 1.2 % (ref 0–0.5)
LYMPHOCYTES # BLD AUTO: 1.3 K/UL (ref 1–4.8)
LYMPHOCYTES NFR BLD: 25 % (ref 18–48)
MCH RBC QN AUTO: 33.2 PG (ref 27–31)
MCHC RBC AUTO-ENTMCNC: 32.6 G/DL (ref 32–36)
MCV RBC AUTO: 102 FL (ref 82–98)
MONOCYTES # BLD AUTO: 0.7 K/UL (ref 0.3–1)
MONOCYTES NFR BLD: 13.3 % (ref 4–15)
NEUTROPHILS # BLD AUTO: 2.9 K/UL (ref 1.8–7.7)
NEUTROPHILS NFR BLD: 58.3 % (ref 38–73)
NRBC BLD-RTO: 0 /100 WBC
PLATELET # BLD AUTO: 286 K/UL (ref 150–450)
PMV BLD AUTO: 8.4 FL (ref 9.2–12.9)
RBC # BLD AUTO: 3.4 M/UL (ref 4–5.4)
WBC # BLD AUTO: 5.04 K/UL (ref 3.9–12.7)

## 2022-04-01 PROCEDURE — 36415 COLL VENOUS BLD VENIPUNCTURE: CPT | Performed by: INTERNAL MEDICINE

## 2022-04-01 PROCEDURE — 85025 COMPLETE CBC W/AUTO DIFF WBC: CPT | Performed by: INTERNAL MEDICINE

## 2022-04-01 NOTE — PROGRESS NOTES
Lab Results   Component Value Date    WBC 5.04 04/01/2022    HGB 11.3 (L) 04/01/2022    HCT 34.7 (L) 04/01/2022     04/01/2022       Hemoglobin outside treatment parameters. RETAcrit not given. Patient will return in 2 weeks for routine labs and epo injection pending results.

## 2022-04-04 ENCOUNTER — OFFICE VISIT (OUTPATIENT)
Dept: FAMILY MEDICINE | Facility: CLINIC | Age: 77
End: 2022-04-04
Payer: MEDICARE

## 2022-04-04 ENCOUNTER — LAB VISIT (OUTPATIENT)
Dept: LAB | Facility: HOSPITAL | Age: 77
End: 2022-04-04
Attending: FAMILY MEDICINE
Payer: MEDICARE

## 2022-04-04 VITALS
BODY MASS INDEX: 25.23 KG/M2 | SYSTOLIC BLOOD PRESSURE: 130 MMHG | OXYGEN SATURATION: 96 % | WEIGHT: 137.13 LBS | RESPIRATION RATE: 16 BRPM | TEMPERATURE: 98 F | HEART RATE: 98 BPM | DIASTOLIC BLOOD PRESSURE: 60 MMHG | HEIGHT: 62 IN

## 2022-04-04 DIAGNOSIS — R20.0 LEFT ARM NUMBNESS: ICD-10-CM

## 2022-04-04 DIAGNOSIS — D63.1 ANEMIA IN STAGE 3 CHRONIC KIDNEY DISEASE, UNSPECIFIED WHETHER STAGE 3A OR 3B CKD: ICD-10-CM

## 2022-04-04 DIAGNOSIS — H53.132 SUDDEN VISUAL LOSS, LEFT EYE: ICD-10-CM

## 2022-04-04 DIAGNOSIS — E11.3292 CONTROLLED TYPE 2 DIABETES MELLITUS WITH LEFT EYE AFFECTED BY MILD NONPROLIFERATIVE RETINOPATHY WITHOUT MACULAR EDEMA, WITHOUT LONG-TERM CURRENT USE OF INSULIN: ICD-10-CM

## 2022-04-04 DIAGNOSIS — E03.8 OTHER SPECIFIED HYPOTHYROIDISM: ICD-10-CM

## 2022-04-04 DIAGNOSIS — N18.30 ANEMIA IN STAGE 3 CHRONIC KIDNEY DISEASE, UNSPECIFIED WHETHER STAGE 3A OR 3B CKD: ICD-10-CM

## 2022-04-04 DIAGNOSIS — Z98.1 S/P CERVICAL SPINAL FUSION: Primary | ICD-10-CM

## 2022-04-04 DIAGNOSIS — M48.02 CERVICAL SPINAL STENOSIS: ICD-10-CM

## 2022-04-04 DIAGNOSIS — M46.1 SACROILIITIS: ICD-10-CM

## 2022-04-04 PROBLEM — I26.99 PULMONARY EMBOLISM: Status: RESOLVED | Noted: 2021-07-10 | Resolved: 2022-04-04

## 2022-04-04 LAB — TSH SERPL DL<=0.005 MIU/L-ACNC: 1.16 UIU/ML (ref 0.4–4)

## 2022-04-04 PROCEDURE — 99999 PR PBB SHADOW E&M-EST. PATIENT-LVL III: CPT | Mod: PBBFAC,,, | Performed by: FAMILY MEDICINE

## 2022-04-04 PROCEDURE — 99214 PR OFFICE/OUTPT VISIT, EST, LEVL IV, 30-39 MIN: ICD-10-PCS | Mod: S$GLB,,, | Performed by: FAMILY MEDICINE

## 2022-04-04 PROCEDURE — 3288F PR FALLS RISK ASSESSMENT DOCUMENTED: ICD-10-PCS | Mod: CPTII,S$GLB,, | Performed by: FAMILY MEDICINE

## 2022-04-04 PROCEDURE — 1125F PR PAIN SEVERITY QUANTIFIED, PAIN PRESENT: ICD-10-PCS | Mod: CPTII,S$GLB,, | Performed by: FAMILY MEDICINE

## 2022-04-04 PROCEDURE — 3075F PR MOST RECENT SYSTOLIC BLOOD PRESS GE 130-139MM HG: ICD-10-PCS | Mod: CPTII,S$GLB,, | Performed by: FAMILY MEDICINE

## 2022-04-04 PROCEDURE — 36415 COLL VENOUS BLD VENIPUNCTURE: CPT | Mod: PN | Performed by: FAMILY MEDICINE

## 2022-04-04 PROCEDURE — 1101F PT FALLS ASSESS-DOCD LE1/YR: CPT | Mod: CPTII,S$GLB,, | Performed by: FAMILY MEDICINE

## 2022-04-04 PROCEDURE — 3288F FALL RISK ASSESSMENT DOCD: CPT | Mod: CPTII,S$GLB,, | Performed by: FAMILY MEDICINE

## 2022-04-04 PROCEDURE — 99999 PR PBB SHADOW E&M-EST. PATIENT-LVL III: ICD-10-PCS | Mod: PBBFAC,,, | Performed by: FAMILY MEDICINE

## 2022-04-04 PROCEDURE — 3078F PR MOST RECENT DIASTOLIC BLOOD PRESSURE < 80 MM HG: ICD-10-PCS | Mod: CPTII,S$GLB,, | Performed by: FAMILY MEDICINE

## 2022-04-04 PROCEDURE — 1157F PR ADVANCE CARE PLAN OR EQUIV PRESENT IN MEDICAL RECORD: ICD-10-PCS | Mod: CPTII,S$GLB,, | Performed by: FAMILY MEDICINE

## 2022-04-04 PROCEDURE — 3072F LOW RISK FOR RETINOPATHY: CPT | Mod: CPTII,S$GLB,, | Performed by: FAMILY MEDICINE

## 2022-04-04 PROCEDURE — 82570 ASSAY OF URINE CREATININE: CPT | Performed by: FAMILY MEDICINE

## 2022-04-04 PROCEDURE — 82043 UR ALBUMIN QUANTITATIVE: CPT | Performed by: FAMILY MEDICINE

## 2022-04-04 PROCEDURE — 99214 OFFICE O/P EST MOD 30 MIN: CPT | Mod: S$GLB,,, | Performed by: FAMILY MEDICINE

## 2022-04-04 PROCEDURE — 99499 UNLISTED E&M SERVICE: CPT | Mod: S$GLB,,, | Performed by: FAMILY MEDICINE

## 2022-04-04 PROCEDURE — 84443 ASSAY THYROID STIM HORMONE: CPT | Performed by: FAMILY MEDICINE

## 2022-04-04 PROCEDURE — 83036 HEMOGLOBIN GLYCOSYLATED A1C: CPT | Performed by: FAMILY MEDICINE

## 2022-04-04 PROCEDURE — 99499 RISK ADDL DX/OHS AUDIT: ICD-10-PCS | Mod: S$GLB,,, | Performed by: FAMILY MEDICINE

## 2022-04-04 PROCEDURE — 1157F ADVNC CARE PLAN IN RCRD: CPT | Mod: CPTII,S$GLB,, | Performed by: FAMILY MEDICINE

## 2022-04-04 PROCEDURE — 3078F DIAST BP <80 MM HG: CPT | Mod: CPTII,S$GLB,, | Performed by: FAMILY MEDICINE

## 2022-04-04 PROCEDURE — 3072F PR LOW RISK FOR RETINOPATHY: ICD-10-PCS | Mod: CPTII,S$GLB,, | Performed by: FAMILY MEDICINE

## 2022-04-04 PROCEDURE — 3075F SYST BP GE 130 - 139MM HG: CPT | Mod: CPTII,S$GLB,, | Performed by: FAMILY MEDICINE

## 2022-04-04 PROCEDURE — 1125F AMNT PAIN NOTED PAIN PRSNT: CPT | Mod: CPTII,S$GLB,, | Performed by: FAMILY MEDICINE

## 2022-04-04 PROCEDURE — 1101F PR PT FALLS ASSESS DOC 0-1 FALLS W/OUT INJ PAST YR: ICD-10-PCS | Mod: CPTII,S$GLB,, | Performed by: FAMILY MEDICINE

## 2022-04-04 RX ORDER — CELECOXIB 100 MG/1
100 CAPSULE ORAL DAILY
Qty: 90 CAPSULE | Refills: 2 | Status: SHIPPED | OUTPATIENT
Start: 2022-04-04 | End: 2022-07-03

## 2022-04-04 RX ORDER — TRIAMCINOLONE ACETONIDE 40 MG/ML
INJECTION, SUSPENSION INTRA-ARTICULAR; INTRAMUSCULAR
COMMUNITY
Start: 2022-01-10 | End: 2022-04-04

## 2022-04-04 RX ORDER — INFLUENZA A VIRUS A/VICTORIA/2570/2019 IVR-215 (H1N1) ANTIGEN (FORMALDEHYDE INACTIVATED), INFLUENZA A VIRUS A/TASMANIA/503/2020 IVR-221 (H3N2) ANTIGEN (FORMALDEHYDE INACTIVATED), INFLUENZA B VIRUS B/PHUKET/3073/2013 ANTIGEN (FORMALDEHYDE INACTIVATED), AND INFLUENZA B VIRUS B/WASHINGTON/02/2019 ANTIGEN (FORMALDEHYDE INACTIVATED) 60; 60; 60; 60 UG/.7ML; UG/.7ML; UG/.7ML; UG/.7ML
INJECTION, SUSPENSION INTRAMUSCULAR
COMMUNITY
Start: 2021-10-08 | End: 2022-04-04

## 2022-04-04 NOTE — PROGRESS NOTES
Chief Complaint   Patient presents with    Hypertension    Diabetes       HPI  Regino Lawrence is a 76 y.o. 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, without long-term current use of insulin    Sarcoidosis    Corneal scar, right eye    Nuclear sclerosis    Senile nuclear sclerosis    Left shoulder pain    Cervical spinal stenosis    Patient is [place]    GERD (gastroesophageal reflux disease)    Debility    S/P cervical spinal fusion    Chronic bilateral low back pain with left-sided sciatica    Degenerative disc disease, lumbar    Poor motor control of trunk    Impaired mobility    Muscle weakness    Iron deficiency anemia    Anemia in CKD (chronic kidney disease)    Refusal of blood transfusions as patient is [place]anda[place]    Current use of steroid medication    DM type 2 without retinopathy    Pseudophakia    Insufficiency of tear film of both eyes    Refractive error    Myopathy    Osteopenia    Calcification of aorta    Dry eye syndrome, bilateral    Neck pain    Lumbar disc herniation with radiculopathy    Antalgic gait    Lumbar radiculopathy    Lumbar stenosis with neurogenic claudication    Anemia in stage 3 chronic kidney disease, unspecified whether stage 3a or 3b CKD    Greater trochanteric bursitis of left hip    Left hip pain    FAUSTIN (dyspnea on exertion)    Chest pain, atypical    Hemispheric retinal vein occlusion with macular edema of left eye     Degenerative joint disease (DJD) of hip    Chronic pain    Left sided numbness    Sacroiliitis    Osteoarthritis of hip    Macrocytosis without anemia    Seizure    T4 vertebral fracture    Cervical subluxation    Stenosis of cervical spine    History of noncompliance with medical treatment       Presenting for follow-up for HTN and DM and chronic pain    She is having improved breathing with her blood count being higher  She has had improved pain since having an injection in her hip by her rheumatologists   She takes celebrex and toradol arthritis for pain  She is having left sided facial numbness that radiates to her neck and arm  She has also had subconjunctival hemorrhage in January and has seen ophtho; she is still having blurred vision but hemorrhage has resolved    CT cervical spine 11/21  Impression:     Extensive postoperative change throughout the cervical spine with decompressive laminectomies and posterior fusion hardware at C3 through C7.  Difficult to evaluate spinal canal at these levels due to extensive beam hardening artifact.  No evidence of hardware failure.  Refer to most recent MRI for better characterization.     Persistent advanced degenerative disc disease at C2-C3 with mild anterior subluxation of C2 on C3 resulting in moderate to severe narrowing.  Better characterized on prior MRI.     Stable L4 vertebral body compression deformity with approximately 50% height loss.     Additional findings as above.       ROS  Review of Systems   Constitutional: Negative for chills, diaphoresis, fatigue, fever and unexpected weight change.   HENT: Negative for rhinorrhea, sinus pressure, sore throat and tinnitus.    Eyes: Positive for visual disturbance. Negative for photophobia.   Respiratory: Negative for cough, shortness of breath and wheezing.    Cardiovascular: Negative for chest pain and palpitations.   Gastrointestinal: Negative for abdominal pain, blood in stool, constipation,  "diarrhea, nausea and vomiting.   Genitourinary: Negative for dysuria, flank pain, frequency and vaginal discharge.   Musculoskeletal: Positive for arthralgias and back pain. Negative for joint swelling.   Skin: Negative for rash.   Neurological: Positive for numbness. Negative for speech difficulty, weakness, light-headedness and headaches.   Psychiatric/Behavioral: Negative for behavioral problems and dysphoric mood.       Physical Exam  Vitals:    04/04/22 1005   BP: 130/60   Pulse: 98   Resp: 16   Temp: 98 °F (36.7 °C)   TempSrc: Oral   SpO2: 96%   Weight: 62.2 kg (137 lb 2 oz)   Height: 5' 2" (1.575 m)    Body mass index is 25.08 kg/m².  Weight: 62.2 kg (137 lb 2 oz)   Height: 5' 2" (157.5 cm)     Physical Exam  Vitals and nursing note reviewed.   Constitutional:       Appearance: She is well-developed.   Neck:     Skin:     General: Skin is warm and dry.      Findings: No erythema or rash.   Neurological:      Mental Status: She is alert and oriented to person, place, and time.   Psychiatric:         Behavior: Behavior normal.         ALLERGIES AND MEDICATIONS: updated and reviewed.  Review of patient's allergies indicates:   Allergen Reactions    Azathioprine Shortness Of Breath and Other (See Comments)     Fatigue     Medication List with Changes/Refills   Current Medications    ACCU-CHEK FASTCLIX LANCING DEV KIT    USE AS DIRECTED.    ALCOHOL ANTISEPTIC PADS (ALCOHOL PREP PADS TOP)        APIXABAN (ELIQUIS) 5 MG TAB    Take 1 tablet (5 mg total) by mouth 2 (two) times daily. Start this prescription after finishing starter pack    ATORVASTATIN (LIPITOR) 20 MG TABLET    Take 1 tablet (20 mg total) by mouth once daily.    BLOOD SUGAR DIAGNOSTIC (ACCU-CHEK SMARTVIEW TEST STRIP) STRP    Use as directed to check blood sugar twice daily.    BLOOD-GLUCOSE METER KIT    Use as instructed    BRIVARACETAM (BRIVIACT) 75 MG TAB    Take 1 tablet (75 mg total) by mouth 2 (two) times daily.    CALCIUM CARBONATE (OS-MALCOLM) 600 " MG CALCIUM (1,500 MG) TAB    Take 1 tablet by mouth 2 (two) times daily.     CARVEDILOL (COREG) 25 MG TABLET    Take 1 tablet (25 mg total) by mouth 2 (two) times daily.    DULOXETINE (CYMBALTA) 20 MG CAPSULE    Take 1 capsule (20 mg total) by mouth once daily.    EPOETIN WOLFGANG-EPBX (RETACRIT) 10,000 UNIT/ML IMJECTION    Inject 20,000 Units into the skin every 14 (fourteen) days.    FENOFIBRATE 160 MG TAB    Take 1 tablet (160 mg total) by mouth once daily.    FOLIC ACID (FOLVITE) 1 MG TABLET    Take 1 tablet (1,000 mcg total) by mouth once daily.    HYDRALAZINE (APRESOLINE) 25 MG TABLET    Take 2 tabs every 8 hours by mouth. Check BP 2 hours after each dose and if Blood pressure >160- please take 1 extra tab    LEVETIRACETAM (KEPPRA) 250 MG TAB    Take 1 tablet (250 mg total) by mouth 2 (two) times daily.    LEVOTHYROXINE (EUTHYROX) 50 MCG TABLET    TAKE 1 TABLET BY MOUTH ONCE DAILY BEFORE BREAKFAST    METFORMIN (GLUCOPHAGE) 1000 MG TABLET    TAKE 1 TABLET BY MOUTH TWICE DAILY WITH MEALS    NIFEDIPINE (PROCARDIA-XL) 60 MG (OSM) 24 HR TABLET    Take 1 tablet (60 mg total) by mouth once daily.    OLOPATADINE (PATANOL) 0.1 % OPHTHALMIC SOLUTION    Place 1 drop into both eyes daily as needed for Allergies.    POTASSIUM CHLORIDE SA (K-DUR,KLOR-CON) 20 MEQ TABLET    Take 1 tablet (20 mEq total) by mouth 2 (two) times daily.    PREDNISONE (DELTASONE) 5 MG TABLET    Take 1 tablet by mouth once daily    TIZANIDINE (ZANAFLEX) 2 MG TABLET    Take 2 tablets (4 mg total) by mouth every 8 (eight) hours as needed (muscle spasm).   Changed and/or Refilled Medications    Modified Medication Previous Medication    CELECOXIB (CELEBREX) 100 MG CAPSULE celecoxib (CELEBREX) 100 MG capsule       Take 1 capsule (100 mg total) by mouth once daily.    Take 1 capsule (100 mg total) by mouth once daily.   Discontinued Medications    FLUZONE HIGHDOSE QUAD 21-22  MCG/0.7 ML SYRG        TRIAMCINOLONE ACETONIDE (KENALOG-40) 40 MG/ML  INJECTION           Assessment & Plan  1. S/P cervical spinal fusion    2. 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 Sacroiliitis    4. Anemia in stage 3 chronic kidney disease, unspecified whether stage 3a or 3b CKD    5. Cervical spinal stenosis    6. Other specified hypothyroidism    7. Left arm numbness    8. 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   without macular edema, without long-term current use of insulin (Chronic)  Recommend f/u with ophtho for blurred vision    Relevant Orders    Hemoglobin A1C    Microalbumin/Creatinine Ratio, Urine       Endocrine    Hypothyroidism (Chronic)  Monitor with routine labs    Relevant Orders    TSH       Orthopedic    Sacroiliitis  Improvement in hip pain with joint injection       Other    Anemia in stage 3 chronic kidney disease, unspecified whether stage 3a or 3b CKD  Has f/u with hematology  Counts have been stable  Does not get transfusions as she is [facility]      Other Visit Diagnoses     Left arm numbness      Will eval with carotid US  Consider cervical DJD    Relevant Orders    CV Ultrasound Bilateral Doppler Carotid    Sudden visual loss, left eye       Will eval with carotid US      Relevant Orders    CV Ultrasound Bilateral Doppler Carotid          Follow up in about 3 months (around 7/4/2022) for  management of chronic conditions.    Other Orders Placed This Visit:  Orders Placed This Encounter   Procedures    Hemoglobin A1C    Microalbumin/Creatinine Ratio, Urine    TSH    CV Ultrasound Bilateral Doppler Carotid

## 2022-04-05 ENCOUNTER — PATIENT MESSAGE (OUTPATIENT)
Dept: FAMILY MEDICINE | Facility: CLINIC | Age: 77
End: 2022-04-05
Payer: MEDICARE

## 2022-04-05 LAB
ALBUMIN/CREAT UR: 64.9 UG/MG (ref 0–30)
CREAT UR-MCNC: 37 MG/DL (ref 15–325)
ESTIMATED AVG GLUCOSE: 100 MG/DL (ref 68–131)
HBA1C MFR BLD: 5.1 % (ref 4–5.6)
MICROALBUMIN UR DL<=1MG/L-MCNC: 24 UG/ML

## 2022-04-06 ENCOUNTER — TELEPHONE (OUTPATIENT)
Dept: FAMILY MEDICINE | Facility: CLINIC | Age: 77
End: 2022-04-06
Payer: MEDICARE

## 2022-04-06 ENCOUNTER — TELEPHONE (OUTPATIENT)
Dept: OPHTHALMOLOGY | Facility: CLINIC | Age: 77
End: 2022-04-06
Payer: MEDICARE

## 2022-04-06 NOTE — TELEPHONE ENCOUNTER
Pt was scheduled to see MD after eye welling and facial swelling when she called today she was worked in. Pt was not able to get here in time. Provider has meeting to attend. Pt advised UC per provider. She expressed understanding and will go.

## 2022-04-14 ENCOUNTER — INFUSION (OUTPATIENT)
Dept: INFUSION THERAPY | Facility: HOSPITAL | Age: 77
End: 2022-04-14
Attending: INTERNAL MEDICINE
Payer: MEDICARE

## 2022-04-14 VITALS
TEMPERATURE: 97 F | RESPIRATION RATE: 16 BRPM | SYSTOLIC BLOOD PRESSURE: 131 MMHG | OXYGEN SATURATION: 96 % | HEART RATE: 94 BPM | DIASTOLIC BLOOD PRESSURE: 65 MMHG

## 2022-04-14 DIAGNOSIS — D50.9 IRON DEFICIENCY ANEMIA, UNSPECIFIED IRON DEFICIENCY ANEMIA TYPE: ICD-10-CM

## 2022-04-14 DIAGNOSIS — N18.9 ANEMIA IN CHRONIC KIDNEY DISEASE, UNSPECIFIED CKD STAGE: ICD-10-CM

## 2022-04-14 DIAGNOSIS — D63.1 ANEMIA IN CHRONIC KIDNEY DISEASE, UNSPECIFIED CKD STAGE: ICD-10-CM

## 2022-04-14 DIAGNOSIS — Z53.1 REFUSAL OF BLOOD TRANSFUSIONS AS PATIENT IS JEHOVAH'S WITNESS: ICD-10-CM

## 2022-04-14 DIAGNOSIS — N18.30 ANEMIA IN STAGE 3 CHRONIC KIDNEY DISEASE, UNSPECIFIED WHETHER STAGE 3A OR 3B CKD: Primary | ICD-10-CM

## 2022-04-14 DIAGNOSIS — D63.1 ANEMIA IN STAGE 3 CHRONIC KIDNEY DISEASE, UNSPECIFIED WHETHER STAGE 3A OR 3B CKD: Primary | ICD-10-CM

## 2022-04-14 PROCEDURE — 96372 THER/PROPH/DIAG INJ SC/IM: CPT

## 2022-04-14 PROCEDURE — 63600175 PHARM REV CODE 636 W HCPCS: Mod: JG | Performed by: INTERNAL MEDICINE

## 2022-04-14 RX ADMIN — EPOETIN ALFA-EPBX 20000 UNITS: 20000 INJECTION, SOLUTION INTRAVENOUS; SUBCUTANEOUS at 11:04

## 2022-04-14 NOTE — PLAN OF CARE
Pt arrived to unit. VSS. No new issues noted. Hbg 10.2. Tolerated injection. Pt has next appts. Pt discharged from unit in motorized scooter, accompanied by family. No distress noted.

## 2022-04-28 ENCOUNTER — TELEPHONE (OUTPATIENT)
Dept: NEUROSURGERY | Facility: CLINIC | Age: 77
End: 2022-04-28
Payer: MEDICARE

## 2022-04-28 NOTE — TELEPHONE ENCOUNTER
Returned pt call and lvm for pt requesting a call back.           ----- Message from Ghada Serna sent at 4/28/2022 12:14 PM CDT -----  Contact: @[phone]  Pt requesting a call back regarding the severe pain she is in and she has pain running down both sides of her face. Pt states her neck all the way round is swollen.  She states she cannot hold her head back.  Pt states she told her Primary Care and nothing happened.  Pt would like to have this checked to be sure nothing is going on.

## 2022-04-29 ENCOUNTER — INFUSION (OUTPATIENT)
Dept: INFUSION THERAPY | Facility: HOSPITAL | Age: 77
End: 2022-04-29
Attending: INTERNAL MEDICINE
Payer: MEDICARE

## 2022-04-29 VITALS
RESPIRATION RATE: 16 BRPM | HEART RATE: 68 BPM | TEMPERATURE: 98 F | DIASTOLIC BLOOD PRESSURE: 67 MMHG | OXYGEN SATURATION: 99 % | SYSTOLIC BLOOD PRESSURE: 143 MMHG

## 2022-04-29 DIAGNOSIS — D50.9 IRON DEFICIENCY ANEMIA, UNSPECIFIED IRON DEFICIENCY ANEMIA TYPE: ICD-10-CM

## 2022-04-29 DIAGNOSIS — Z53.1 REFUSAL OF BLOOD TRANSFUSIONS AS PATIENT IS JEHOVAH'S WITNESS: ICD-10-CM

## 2022-04-29 DIAGNOSIS — N18.9 ANEMIA IN CHRONIC KIDNEY DISEASE, UNSPECIFIED CKD STAGE: ICD-10-CM

## 2022-04-29 DIAGNOSIS — D63.1 ANEMIA IN STAGE 3 CHRONIC KIDNEY DISEASE, UNSPECIFIED WHETHER STAGE 3A OR 3B CKD: Primary | ICD-10-CM

## 2022-04-29 DIAGNOSIS — D63.1 ANEMIA IN CHRONIC KIDNEY DISEASE, UNSPECIFIED CKD STAGE: ICD-10-CM

## 2022-04-29 DIAGNOSIS — N18.30 ANEMIA IN STAGE 3 CHRONIC KIDNEY DISEASE, UNSPECIFIED WHETHER STAGE 3A OR 3B CKD: Primary | ICD-10-CM

## 2022-04-29 PROCEDURE — 63600175 PHARM REV CODE 636 W HCPCS: Mod: JG | Performed by: INTERNAL MEDICINE

## 2022-04-29 PROCEDURE — 96372 THER/PROPH/DIAG INJ SC/IM: CPT

## 2022-04-29 RX ADMIN — EPOETIN ALFA-EPBX 20000 UNITS: 20000 INJECTION, SOLUTION INTRAVENOUS; SUBCUTANEOUS at 12:04

## 2022-04-29 NOTE — PLAN OF CARE
Pt arrived to unit. VSS. Tolerated injection. Pt has next appts. Pt discharged from unit in motorized scooter. No distress noted.

## 2022-05-03 ENCOUNTER — LAB VISIT (OUTPATIENT)
Dept: LAB | Facility: HOSPITAL | Age: 77
End: 2022-05-03
Attending: INTERNAL MEDICINE
Payer: MEDICARE

## 2022-05-03 ENCOUNTER — OFFICE VISIT (OUTPATIENT)
Dept: RHEUMATOLOGY | Facility: CLINIC | Age: 77
End: 2022-05-03
Payer: MEDICARE

## 2022-05-03 ENCOUNTER — PATIENT OUTREACH (OUTPATIENT)
Dept: ADMINISTRATIVE | Facility: OTHER | Age: 77
End: 2022-05-03
Payer: MEDICARE

## 2022-05-03 VITALS
DIASTOLIC BLOOD PRESSURE: 88 MMHG | TEMPERATURE: 99 F | SYSTOLIC BLOOD PRESSURE: 172 MMHG | HEART RATE: 98 BPM | BODY MASS INDEX: 24.63 KG/M2 | WEIGHT: 133.81 LBS | HEIGHT: 62 IN

## 2022-05-03 DIAGNOSIS — D84.821 IMMUNOSUPPRESSION DUE TO CHRONIC STEROID USE: ICD-10-CM

## 2022-05-03 DIAGNOSIS — M79.10 MYALGIA: ICD-10-CM

## 2022-05-03 DIAGNOSIS — G72.9 MYOPATHY: ICD-10-CM

## 2022-05-03 DIAGNOSIS — Z79.52 IMMUNOSUPPRESSION DUE TO CHRONIC STEROID USE: ICD-10-CM

## 2022-05-03 DIAGNOSIS — T38.0X5A IMMUNOSUPPRESSION DUE TO CHRONIC STEROID USE: ICD-10-CM

## 2022-05-03 DIAGNOSIS — D86.9 SARCOIDOSIS: ICD-10-CM

## 2022-05-03 DIAGNOSIS — R53.83 FATIGUE, UNSPECIFIED TYPE: ICD-10-CM

## 2022-05-03 DIAGNOSIS — D86.86 SARCOID ARTHROPATHY: ICD-10-CM

## 2022-05-03 DIAGNOSIS — D86.9 SARCOIDOSIS: Primary | ICD-10-CM

## 2022-05-03 LAB
ALBUMIN SERPL BCP-MCNC: 4.3 G/DL (ref 3.5–5.2)
ALP SERPL-CCNC: 67 U/L (ref 55–135)
ALT SERPL W/O P-5'-P-CCNC: 15 U/L (ref 10–44)
ANION GAP SERPL CALC-SCNC: 12 MMOL/L (ref 8–16)
AST SERPL-CCNC: 19 U/L (ref 10–40)
BASOPHILS # BLD AUTO: 0.03 K/UL (ref 0–0.2)
BASOPHILS NFR BLD: 0.4 % (ref 0–1.9)
BILIRUB SERPL-MCNC: 0.5 MG/DL (ref 0.1–1)
BUN SERPL-MCNC: 13 MG/DL (ref 8–23)
CALCIUM SERPL-MCNC: 10.4 MG/DL (ref 8.7–10.5)
CHLORIDE SERPL-SCNC: 101 MMOL/L (ref 95–110)
CK SERPL-CCNC: 131 U/L (ref 20–180)
CO2 SERPL-SCNC: 25 MMOL/L (ref 23–29)
CREAT SERPL-MCNC: 1.1 MG/DL (ref 0.5–1.4)
CRP SERPL-MCNC: 4.1 MG/L (ref 0–8.2)
DIFFERENTIAL METHOD: ABNORMAL
EOSINOPHIL # BLD AUTO: 0.1 K/UL (ref 0–0.5)
EOSINOPHIL NFR BLD: 1.6 % (ref 0–8)
ERYTHROCYTE [DISTWIDTH] IN BLOOD BY AUTOMATED COUNT: 13.9 % (ref 11.5–14.5)
ERYTHROCYTE [SEDIMENTATION RATE] IN BLOOD BY WESTERGREN METHOD: 6 MM/HR (ref 0–36)
EST. GFR  (AFRICAN AMERICAN): 56.4 ML/MIN/1.73 M^2
EST. GFR  (NON AFRICAN AMERICAN): 48.9 ML/MIN/1.73 M^2
GLUCOSE SERPL-MCNC: 122 MG/DL (ref 70–110)
HCT VFR BLD AUTO: 36.8 % (ref 37–48.5)
HGB BLD-MCNC: 11.7 G/DL (ref 12–16)
IMM GRANULOCYTES # BLD AUTO: 0.06 K/UL (ref 0–0.04)
IMM GRANULOCYTES NFR BLD AUTO: 0.9 % (ref 0–0.5)
LYMPHOCYTES # BLD AUTO: 1.2 K/UL (ref 1–4.8)
LYMPHOCYTES NFR BLD: 18.2 % (ref 18–48)
MCH RBC QN AUTO: 32.9 PG (ref 27–31)
MCHC RBC AUTO-ENTMCNC: 31.8 G/DL (ref 32–36)
MCV RBC AUTO: 103 FL (ref 82–98)
MONOCYTES # BLD AUTO: 0.7 K/UL (ref 0.3–1)
MONOCYTES NFR BLD: 9.7 % (ref 4–15)
NEUTROPHILS # BLD AUTO: 4.7 K/UL (ref 1.8–7.7)
NEUTROPHILS NFR BLD: 69.2 % (ref 38–73)
NRBC BLD-RTO: 0 /100 WBC
PLATELET # BLD AUTO: 383 K/UL (ref 150–450)
PMV BLD AUTO: 8.8 FL (ref 9.2–12.9)
POTASSIUM SERPL-SCNC: 3.9 MMOL/L (ref 3.5–5.1)
PROT SERPL-MCNC: 7.9 G/DL (ref 6–8.4)
RBC # BLD AUTO: 3.56 M/UL (ref 4–5.4)
SODIUM SERPL-SCNC: 138 MMOL/L (ref 136–145)
WBC # BLD AUTO: 6.83 K/UL (ref 3.9–12.7)

## 2022-05-03 PROCEDURE — 1125F AMNT PAIN NOTED PAIN PRSNT: CPT | Mod: CPTII,S$GLB,, | Performed by: INTERNAL MEDICINE

## 2022-05-03 PROCEDURE — 82085 ASSAY OF ALDOLASE: CPT | Performed by: INTERNAL MEDICINE

## 2022-05-03 PROCEDURE — 82550 ASSAY OF CK (CPK): CPT | Performed by: INTERNAL MEDICINE

## 2022-05-03 PROCEDURE — 85652 RBC SED RATE AUTOMATED: CPT | Performed by: INTERNAL MEDICINE

## 2022-05-03 PROCEDURE — 1125F PR PAIN SEVERITY QUANTIFIED, PAIN PRESENT: ICD-10-PCS | Mod: CPTII,S$GLB,, | Performed by: INTERNAL MEDICINE

## 2022-05-03 PROCEDURE — 96372 PR INJECTION,THERAP/PROPH/DIAG2ST, IM OR SUBCUT: ICD-10-PCS | Mod: S$GLB,,, | Performed by: INTERNAL MEDICINE

## 2022-05-03 PROCEDURE — 80053 COMPREHEN METABOLIC PANEL: CPT | Performed by: INTERNAL MEDICINE

## 2022-05-03 PROCEDURE — 99999 PR PBB SHADOW E&M-EST. PATIENT-LVL III: ICD-10-PCS | Mod: PBBFAC,,, | Performed by: INTERNAL MEDICINE

## 2022-05-03 PROCEDURE — 99214 PR OFFICE/OUTPT VISIT, EST, LEVL IV, 30-39 MIN: ICD-10-PCS | Mod: 25,S$GLB,, | Performed by: INTERNAL MEDICINE

## 2022-05-03 PROCEDURE — 1160F PR REVIEW ALL MEDS BY PRESCRIBER/CLIN PHARMACIST DOCUMENTED: ICD-10-PCS | Mod: CPTII,S$GLB,, | Performed by: INTERNAL MEDICINE

## 2022-05-03 PROCEDURE — 1159F PR MEDICATION LIST DOCUMENTED IN MEDICAL RECORD: ICD-10-PCS | Mod: CPTII,S$GLB,, | Performed by: INTERNAL MEDICINE

## 2022-05-03 PROCEDURE — 3072F PR LOW RISK FOR RETINOPATHY: ICD-10-PCS | Mod: CPTII,S$GLB,, | Performed by: INTERNAL MEDICINE

## 2022-05-03 PROCEDURE — 3072F LOW RISK FOR RETINOPATHY: CPT | Mod: CPTII,S$GLB,, | Performed by: INTERNAL MEDICINE

## 2022-05-03 PROCEDURE — 99214 OFFICE O/P EST MOD 30 MIN: CPT | Mod: 25,S$GLB,, | Performed by: INTERNAL MEDICINE

## 2022-05-03 PROCEDURE — 1157F ADVNC CARE PLAN IN RCRD: CPT | Mod: CPTII,S$GLB,, | Performed by: INTERNAL MEDICINE

## 2022-05-03 PROCEDURE — 36415 COLL VENOUS BLD VENIPUNCTURE: CPT | Performed by: INTERNAL MEDICINE

## 2022-05-03 PROCEDURE — 99499 RISK ADDL DX/OHS AUDIT: ICD-10-PCS | Mod: S$GLB,,, | Performed by: INTERNAL MEDICINE

## 2022-05-03 PROCEDURE — 1160F RVW MEDS BY RX/DR IN RCRD: CPT | Mod: CPTII,S$GLB,, | Performed by: INTERNAL MEDICINE

## 2022-05-03 PROCEDURE — 96372 THER/PROPH/DIAG INJ SC/IM: CPT | Mod: S$GLB,,, | Performed by: INTERNAL MEDICINE

## 2022-05-03 PROCEDURE — 86140 C-REACTIVE PROTEIN: CPT | Performed by: INTERNAL MEDICINE

## 2022-05-03 PROCEDURE — 1157F PR ADVANCE CARE PLAN OR EQUIV PRESENT IN MEDICAL RECORD: ICD-10-PCS | Mod: CPTII,S$GLB,, | Performed by: INTERNAL MEDICINE

## 2022-05-03 PROCEDURE — 85025 COMPLETE CBC W/AUTO DIFF WBC: CPT | Performed by: INTERNAL MEDICINE

## 2022-05-03 PROCEDURE — 82164 ANGIOTENSIN I ENZYME TEST: CPT | Performed by: INTERNAL MEDICINE

## 2022-05-03 PROCEDURE — 99999 PR PBB SHADOW E&M-EST. PATIENT-LVL III: CPT | Mod: PBBFAC,,, | Performed by: INTERNAL MEDICINE

## 2022-05-03 PROCEDURE — 1159F MED LIST DOCD IN RCRD: CPT | Mod: CPTII,S$GLB,, | Performed by: INTERNAL MEDICINE

## 2022-05-03 PROCEDURE — 99499 UNLISTED E&M SERVICE: CPT | Mod: S$GLB,,, | Performed by: INTERNAL MEDICINE

## 2022-05-03 RX ORDER — TIZANIDINE 2 MG/1
4 TABLET ORAL EVERY 8 HOURS PRN
Qty: 270 TABLET | Refills: 1 | Status: SHIPPED | OUTPATIENT
Start: 2022-05-03 | End: 2022-09-28 | Stop reason: SDUPTHER

## 2022-05-03 RX ORDER — TRIAMCINOLONE ACETONIDE 40 MG/ML
80 INJECTION, SUSPENSION INTRA-ARTICULAR; INTRAMUSCULAR ONCE
Status: COMPLETED | OUTPATIENT
Start: 2022-05-03 | End: 2022-05-03

## 2022-05-03 RX ORDER — PREDNISONE 5 MG/1
5 TABLET ORAL DAILY
Qty: 90 TABLET | Refills: 1 | Status: SHIPPED | OUTPATIENT
Start: 2022-05-03 | End: 2022-07-25 | Stop reason: SDUPTHER

## 2022-05-03 RX ADMIN — TRIAMCINOLONE ACETONIDE 80 MG: 40 INJECTION, SUSPENSION INTRA-ARTICULAR; INTRAMUSCULAR at 11:05

## 2022-05-03 ASSESSMENT — ROUTINE ASSESSMENT OF PATIENT INDEX DATA (RAPID3)
PAIN SCORE: 10
WHEN YOU AWAKENED IN THE MORNING OVER THE LAST WEEK, PLEASE INDICATE THE AMOUNT OF TIME IT TAKES UNTIL YOU ARE AS LIMBER AS YOU WILL BE FOR THE DAY: 15 MINUTES
TOTAL RAPID3 SCORE: 8.33
AM STIFFNESS SCORE: 1, YES
MDHAQ FUNCTION SCORE: 1.5
PSYCHOLOGICAL DISTRESS SCORE: 3.3
PATIENT GLOBAL ASSESSMENT SCORE: 10
FATIGUE SCORE: 10

## 2022-05-03 NOTE — PROGRESS NOTES
Subjective:       Patient ID: Regino Lawrence is a 76 y.o. female.    Chief Complaint: Sarcoidosis    HPI:  Regino Lawrence is a 76 y.o. female with history of sarcoidosis with associated myopathy and   arthropathy. Sarcoidosis that was first manifested by muscle inflammation, low white   blood cell count, hair loss, skin involvement. She was treated in the   past with methotrexate and Plaquenil, both of which were ineffective.   Cellcept and Imuran caused some unknown side effect (she thinks it made her sick).   Colchicine was held due to low WBC.   Although methotrexate did not help in past it was retried and she felt it helped hair growth but did not help body aches.   She held MTX due to an URI but patient has not wanted restarted since then (2013).   Leflunomide was held due to elevated BP after less than a week of use.  July 2021 hospitalized after passing out at home and fracturing L4 .  She was thought to have a seizure. During hospitalization found to have bilateral PE and was placed on Eliquis.      Interval History:   For past 2 weeks pain in neck, back, legs, hands and around face.   Trouble standing.  Puffy face and arms.   Pain 10/10 ache in entire body.  Activity worsens pain.  Steroid helps very little currently.  Patient requesting steroid injection.    Currently on prednisone on 5 mg daily.      .      Review of Systems   Constitutional: Positive for fatigue. Negative for fever and unexpected weight change.   HENT: Negative for mouth sores and trouble swallowing.         Dry mouth   Eyes: Negative for redness.        Dry eyes   Respiratory: Negative.  Negative for cough and shortness of breath.    Cardiovascular: Negative.  Negative for chest pain.   Gastrointestinal: Negative.  Negative for constipation and diarrhea.   Endocrine: Negative.    Genitourinary: Negative.  Negative for dysuria and genital sores.   Musculoskeletal: Positive for arthralgias, back pain, joint swelling and myalgias.  "  Skin: Negative.  Negative for rash.   Allergic/Immunologic: Negative.    Neurological: Negative.  Negative for headaches.   Hematological: Negative.  Does not bruise/bleed easily.   Psychiatric/Behavioral: Negative.          Objective:   BP (!) 172/88   Pulse 98   Temp 98.7 °F (37.1 °C) (Oral)   Ht 5' 2" (1.575 m)   Wt 60.7 kg (133 lb 13.1 oz)   LMP  (LMP Unknown)   BMI 24.48 kg/m²   Pulse Ox 98% on RA     Physical Exam   Constitutional: She is oriented to person, place, and time.   HENT:   Head: Normocephalic and atraumatic.   Eyes: Conjunctivae are normal.   Cardiovascular: Normal rate, regular rhythm and normal heart sounds.   Pulmonary/Chest: Effort normal and breath sounds normal.   Abdominal: Soft. Bowel sounds are normal.   Musculoskeletal:      Comments: 4/5 UE and LE proximal strength bilaterally unchanged  28 joint count: 24 tender (MCPs, PIPs, knees and shoulders) and 4 swollen (2nd and 3rd MCP bilateral)   Pain at left trochanteric bursitis    No pain on compression of MTPs        Neurological: She is alert and oriented to person, place, and time.   With cane   Skin: Skin is warm and dry.   Psychiatric: Mood and affect normal.     LABS    Component      Latest Ref Rng & Units 4/29/2022 4/14/2022 4/4/2022 4/1/2022   WBC      3.90 - 12.70 K/uL 5.78 6.24  5.04   RBC      4.00 - 5.40 M/uL 3.21 (L) 3.05 (L)  3.40 (L)   Hemoglobin      12.0 - 16.0 g/dL 10.5 (L) 10.2 (L)  11.3 (L)   Hematocrit      37.0 - 48.5 % 32.9 (L) 31.4 (L)  34.7 (L)   MCV      82 - 98 fL 103 (H) 103 (H)  102 (H)   MCH      27.0 - 31.0 pg 32.7 (H) 33.4 (H)  33.2 (H)   MCHC      32.0 - 36.0 g/dL 31.9 (L) 32.5  32.6   RDW      11.5 - 14.5 % 13.2 12.9  12.9   Platelets      150 - 450 K/uL 323 287  286   MPV      9.2 - 12.9 fL 8.7 (L) 8.7 (L)  8.4 (L)   Immature Granulocytes      0.0 - 0.5 % 0.9 (H) 1.3 (H)  1.2 (H)   Gran # (ANC)      1.8 - 7.7 K/uL 3.8 3.8  2.9   Immature Grans (Abs)      0.00 - 0.04 K/uL 0.05 (H) 0.08 (H)  0.06 (H) "   Lymph #      1.0 - 4.8 K/uL 1.3 1.5  1.3   Mono #      0.3 - 1.0 K/uL 0.5 0.7  0.7   Eos #      0.0 - 0.5 K/uL 0.1 0.1  0.1   Baso #      0.00 - 0.20 K/uL 0.02 0.03  0.03   nRBC      0 /100 WBC 0 0  0   Gran %      38.0 - 73.0 % 65.1 61.3  58.3   Lymph %      18.0 - 48.0 % 23.2 24.2  25.0   Mono %      4.0 - 15.0 % 9.3 11.1  13.3   Eosinophil %      0.0 - 8.0 % 1.2 1.6  1.6   Basophil %      0.0 - 1.9 % 0.3 0.5  0.6   Differential Method       Automated Automated  Automated   Sodium      136 - 145 mmol/L       Potassium      3.5 - 5.1 mmol/L       Chloride      95 - 110 mmol/L       CO2      23 - 29 mmol/L       Glucose      70 - 110 mg/dL       BUN      8 - 23 mg/dL       Creatinine      0.5 - 1.4 mg/dL       Calcium      8.7 - 10.5 mg/dL       PROTEIN TOTAL      6.0 - 8.4 g/dL       Albumin      3.5 - 5.2 g/dL       BILIRUBIN TOTAL      0.1 - 1.0 mg/dL       Alkaline Phosphatase      55 - 135 U/L       AST      10 - 40 U/L       ALT      10 - 44 U/L       Anion Gap      8 - 16 mmol/L       eGFR if African American      >60 mL/min/1.73 m:2       eGFR if non African American      >60 mL/min/1.73 m:2       Iron      30 - 160 ug/dL       Transferrin      200 - 375 mg/dL       TIBC      250 - 450 ug/dL       Saturated Iron      20 - 50 %       Microalbumin, Urine      ug/mL   24.0    Creatinine, Urine      15.0 - 325.0 mg/dL   37.0    MICROALB/CREAT RATIO      0.0 - 30.0 ug/mg   64.9 (H)    Hemoglobin A1C External      4.0 - 5.6 %   5.1    Estimated Avg Glucose      68 - 131 mg/dL   100    Sed Rate      0 - 36 mm/Hr       CRP      0.0 - 8.2 mg/L       CPK      20 - 180 U/L       Aldolase      1.2 - 7.6 U/L       Ferritin      20.0 - 300.0 ng/mL       TSH      0.400 - 4.000 uIU/mL   1.157      Component      Latest Ref Rng & Units 3/18/2022 1/10/2022   WBC      3.90 - 12.70 K/uL     RBC      4.00 - 5.40 M/uL     Hemoglobin      12.0 - 16.0 g/dL     Hematocrit      37.0 - 48.5 %     MCV      82 - 98 fL     MCH       27.0 - 31.0 pg     MCHC      32.0 - 36.0 g/dL     RDW      11.5 - 14.5 %     Platelets      150 - 450 K/uL     MPV      9.2 - 12.9 fL     Immature Granulocytes      0.0 - 0.5 %     Gran # (ANC)      1.8 - 7.7 K/uL     Immature Grans (Abs)      0.00 - 0.04 K/uL     Lymph #      1.0 - 4.8 K/uL     Mono #      0.3 - 1.0 K/uL     Eos #      0.0 - 0.5 K/uL     Baso #      0.00 - 0.20 K/uL     nRBC      0 /100 WBC     Gran %      38.0 - 73.0 %     Lymph %      18.0 - 48.0 %     Mono %      4.0 - 15.0 %     Eosinophil %      0.0 - 8.0 %     Basophil %      0.0 - 1.9 %     Differential Method           Sodium      136 - 145 mmol/L  135 (L)   Potassium      3.5 - 5.1 mmol/L  4.0   Chloride      95 - 110 mmol/L  100   CO2      23 - 29 mmol/L  26   Glucose      70 - 110 mg/dL  104   BUN      8 - 23 mg/dL  19   Creatinine      0.5 - 1.4 mg/dL  1.0   Calcium      8.7 - 10.5 mg/dL  9.8   PROTEIN TOTAL      6.0 - 8.4 g/dL  7.4   Albumin      3.5 - 5.2 g/dL  4.0   BILIRUBIN TOTAL      0.1 - 1.0 mg/dL  0.4   Alkaline Phosphatase      55 - 135 U/L  55   AST      10 - 40 U/L  16   ALT      10 - 44 U/L  15   Anion Gap      8 - 16 mmol/L  9   eGFR if African American      >60 mL/min/1.73 m:2  >60.0   eGFR if non African American      >60 mL/min/1.73 m:2  54.9 (A)   Iron      30 - 160 ug/dL 84    Transferrin      200 - 375 mg/dL 282    TIBC      250 - 450 ug/dL 417    Saturated Iron      20 - 50 % 20    Microalbumin, Urine      ug/mL     Creatinine, Urine      15.0 - 325.0 mg/dL     MICROALB/CREAT RATIO      0.0 - 30.0 ug/mg     Hemoglobin A1C External      4.0 - 5.6 %     Estimated Avg Glucose      68 - 131 mg/dL     Sed Rate      0 - 36 mm/Hr  <2   CRP      0.0 - 8.2 mg/L  1.3   CPK      20 - 180 U/L  153   Aldolase      1.2 - 7.6 U/L  2.6   Ferritin      20.0 - 300.0 ng/mL 132    TSH      0.400 - 4.000 uIU/mL              Assessment:       1.   Sarcoidosis. Manifested by myopathy and arthropathy.  Persistent joint pain and myalgias  despite prednisone 5 mg. Unable to tolerate immunosuppressants/steroid sparing agents for various reasons. Now with all over body pain.   2.   Myalgia and myositis.  History of fibromyalgia   3.   Osteopenia. Took Fosamax for 5 years stopped 6/2013.  Awaiting patient decision on Prolia after she sees dentist (she has not been able to go).    4.   Fatigue     5.   Diabetes mellitus type 2 in nonobese.    6.           Neck pain. X-ray with degenerative changes. S/p laminectomy-cervical fusion C3-C7 11/16/2015 for cervical spinal stenosis.    7.           Back pain    8.           HTN.  Patient suspects BP elevated due to pain  9.           SOB.  When blood count low.   10.         Anemia.  On Procrit  11.         Seizures.  New onset July 2021.  On chronic medication  12.         Bilateral PE    Plan:       1. Labs and CXR.  2. Kenalog 80 mg IM.  Continue prednisone 5 mg daily.  Risk of elevated BP and BG discussed.  3. [employer] stopped tramadol.  Now off hydrocodone/acetaminophen from primary doctor.  [employer] stopped Flexeril so switch to tizanidine.  Tizanidine stopped after seizure but was restarted without any issues.   Now on seizure medication.  4. Following with nephrology  5. DXA with osteopenia of hip total and femoral neck. FRAX does not suggest treatment however if prednisone goes to 7.5 mg or more will consider Prolia (due renal insufficiency).  Information provided for patient to review.  She read information but did not see dentist to have teeth pulled.  6.  Follow with Dr. Conner regarding spine issues, fracture and pain in neck.  7.  Had COVID vaccine and booster                 RTO in 6 months.    Patient seen face to face for 25 minutes and greater than 50% spent in counseling regarding joint pains,   management of sarcoidosis and pain.

## 2022-05-03 NOTE — PROGRESS NOTES
Health Maintenance Due   Topic Date Due    Shingles Vaccine (1 of 2) 07/20/2015    Pneumococcal Vaccines (Age 65+) (3 - PPSV23 or PCV20) 10/24/2018    COVID-19 Vaccine (4 - Booster for Pfizer series) 02/19/2022     Updates were requested from care everywhere.  Chart was reviewed for overdue Proactive [facility] Encounters ([facility]) topics (CRS, Breast Cancer Screening, Eye exam)  Health Maintenance has been updated.  LINKS immunization registry triggered.  Immunizations were reconciled.

## 2022-05-04 LAB
ACE SERPL-CCNC: 51 U/L (ref 16–85)
ALDOLASE SERPL-CCNC: 3.3 U/L (ref 1.2–7.6)

## 2022-05-13 ENCOUNTER — INFUSION (OUTPATIENT)
Dept: INFUSION THERAPY | Facility: HOSPITAL | Age: 77
End: 2022-05-13
Attending: INTERNAL MEDICINE
Payer: MEDICARE

## 2022-05-13 VITALS
HEART RATE: 107 BPM | SYSTOLIC BLOOD PRESSURE: 159 MMHG | OXYGEN SATURATION: 96 % | DIASTOLIC BLOOD PRESSURE: 76 MMHG | RESPIRATION RATE: 16 BRPM | TEMPERATURE: 98 F

## 2022-05-13 DIAGNOSIS — N18.9 ANEMIA IN CHRONIC KIDNEY DISEASE, UNSPECIFIED CKD STAGE: ICD-10-CM

## 2022-05-13 DIAGNOSIS — N18.30 ANEMIA IN STAGE 3 CHRONIC KIDNEY DISEASE, UNSPECIFIED WHETHER STAGE 3A OR 3B CKD: Primary | ICD-10-CM

## 2022-05-13 DIAGNOSIS — D63.1 ANEMIA IN STAGE 3 CHRONIC KIDNEY DISEASE, UNSPECIFIED WHETHER STAGE 3A OR 3B CKD: Primary | ICD-10-CM

## 2022-05-13 DIAGNOSIS — Z53.1 REFUSAL OF BLOOD TRANSFUSIONS AS PATIENT IS JEHOVAH'S WITNESS: ICD-10-CM

## 2022-05-13 DIAGNOSIS — D63.1 ANEMIA IN CHRONIC KIDNEY DISEASE, UNSPECIFIED CKD STAGE: ICD-10-CM

## 2022-05-13 DIAGNOSIS — D50.9 IRON DEFICIENCY ANEMIA, UNSPECIFIED IRON DEFICIENCY ANEMIA TYPE: ICD-10-CM

## 2022-05-13 PROCEDURE — 63600175 PHARM REV CODE 636 W HCPCS: Mod: JG | Performed by: INTERNAL MEDICINE

## 2022-05-13 PROCEDURE — 96372 THER/PROPH/DIAG INJ SC/IM: CPT

## 2022-05-13 RX ADMIN — EPOETIN ALFA-EPBX 20000 UNITS: 20000 INJECTION, SOLUTION INTRAVENOUS; SUBCUTANEOUS at 11:05

## 2022-05-27 ENCOUNTER — INFUSION (OUTPATIENT)
Dept: INFUSION THERAPY | Facility: HOSPITAL | Age: 77
End: 2022-05-27
Attending: INTERNAL MEDICINE
Payer: MEDICARE

## 2022-05-27 VITALS
DIASTOLIC BLOOD PRESSURE: 69 MMHG | SYSTOLIC BLOOD PRESSURE: 124 MMHG | RESPIRATION RATE: 18 BRPM | TEMPERATURE: 97 F | OXYGEN SATURATION: 97 % | HEART RATE: 93 BPM

## 2022-05-27 DIAGNOSIS — D63.1 ANEMIA IN STAGE 3 CHRONIC KIDNEY DISEASE, UNSPECIFIED WHETHER STAGE 3A OR 3B CKD: Primary | ICD-10-CM

## 2022-05-27 DIAGNOSIS — Z53.1 REFUSAL OF BLOOD TRANSFUSIONS AS PATIENT IS JEHOVAH'S WITNESS: ICD-10-CM

## 2022-05-27 DIAGNOSIS — N18.30 ANEMIA IN STAGE 3 CHRONIC KIDNEY DISEASE, UNSPECIFIED WHETHER STAGE 3A OR 3B CKD: Primary | ICD-10-CM

## 2022-05-27 DIAGNOSIS — D63.1 ANEMIA IN CHRONIC KIDNEY DISEASE, UNSPECIFIED CKD STAGE: ICD-10-CM

## 2022-05-27 DIAGNOSIS — N18.9 ANEMIA IN CHRONIC KIDNEY DISEASE, UNSPECIFIED CKD STAGE: ICD-10-CM

## 2022-05-27 DIAGNOSIS — D50.9 IRON DEFICIENCY ANEMIA, UNSPECIFIED IRON DEFICIENCY ANEMIA TYPE: ICD-10-CM

## 2022-05-27 PROCEDURE — 96372 THER/PROPH/DIAG INJ SC/IM: CPT

## 2022-05-27 PROCEDURE — 63600175 PHARM REV CODE 636 W HCPCS: Mod: JG | Performed by: INTERNAL MEDICINE

## 2022-05-27 RX ADMIN — EPOETIN ALFA-EPBX 20000 UNITS: 20000 INJECTION, SOLUTION INTRAVENOUS; SUBCUTANEOUS at 11:05

## 2022-05-27 NOTE — PLAN OF CARE
Pt arrived to unit. VSS. Tolerated injection. Pt has next appts. Pt discharged from unit in motorized scooter. No distress noted

## 2022-06-06 DIAGNOSIS — E78.2 COMBINED HYPERLIPIDEMIA ASSOCIATED WITH TYPE 2 DIABETES MELLITUS: Chronic | ICD-10-CM

## 2022-06-06 DIAGNOSIS — E11.69 COMBINED HYPERLIPIDEMIA ASSOCIATED WITH TYPE 2 DIABETES MELLITUS: Chronic | ICD-10-CM

## 2022-06-06 NOTE — TELEPHONE ENCOUNTER
Refill Routing Note   Medication(s) are not appropriate for processing by [facility] for the following reason(s):      - Outside of protocol    [facility] action(s):  Route          Medication reconciliation completed: No     Appointments  past 12m or future 3m with PCP    Date Provider   Last Visit   4/4/2022 Micaela Mendoza MD   Next Visit   6/6/2022 Micaela Mendoza MD   ED visits in past 90 days: 0        Note composed:1:07 PM 06/06/2022

## 2022-06-06 NOTE — TELEPHONE ENCOUNTER
No new care gaps identified.  [facility] Embedded Care Gaps. Reference number: [id number]. 6/06/2022   12:45:29 PM CDT

## 2022-06-07 RX ORDER — ATORVASTATIN CALCIUM 20 MG/1
TABLET, FILM COATED ORAL
Qty: 90 TABLET | Refills: 0 | Status: SHIPPED | OUTPATIENT
Start: 2022-06-07 | End: 2022-09-08 | Stop reason: SDUPTHER

## 2022-06-07 NOTE — TELEPHONE ENCOUNTER
Refill Authorization Note   Regino Lawrence  is requesting a refill authorization.  Brief Assessment and Rationale for Refill:  Approve     Medication Therapy Plan:       Medication Reconciliation Completed: No   Comments:     No Care Gaps recommended.     Note composed:9:54 AM 06/07/2022

## 2022-06-09 DIAGNOSIS — E11.69 COMBINED HYPERLIPIDEMIA ASSOCIATED WITH TYPE 2 DIABETES MELLITUS: Chronic | ICD-10-CM

## 2022-06-09 DIAGNOSIS — E78.2 COMBINED HYPERLIPIDEMIA ASSOCIATED WITH TYPE 2 DIABETES MELLITUS: Chronic | ICD-10-CM

## 2022-06-09 NOTE — TELEPHONE ENCOUNTER
Refill Routing Note   Medication(s) are not appropriate for processing by [facility] for the following reason(s):      - Drug-Disease Interaction ( fenofibrate and Iron deficiency anemia; Anemia; Anemia in CKD (chronic kidney disease); Iron deficiency anemia, unspecified iron deficiency anemia type; Anemia in chronic kidney disease, unspecified CKD stage; Anemia in stage 3 chronic kidney disease, unspecified whether stage 3a or 3b CKD; fenofibrate and Myopathy; Muscle weakness )    [facility] action(s):  Route Medication-related problems identified: Drug-disease interaction        Medication reconciliation completed: No     Appointments  past 12m or future 3m with PCP    Date Provider   Last Visit   4/4/2022 Micaela Mendoza MD   Next Visit   Visit date not found Micaela Mendoza MD   ED visits in past 90 days: 0        Note composed:10:24 AM 06/09/2022

## 2022-06-09 NOTE — TELEPHONE ENCOUNTER
No new care gaps identified.  [facility] Embedded Care Gaps. Reference number: [id number]. 6/09/2022   9:07:34 AM REINAT

## 2022-06-10 ENCOUNTER — INFUSION (OUTPATIENT)
Dept: INFUSION THERAPY | Facility: HOSPITAL | Age: 77
End: 2022-06-10
Attending: INTERNAL MEDICINE
Payer: MEDICARE

## 2022-06-10 VITALS
SYSTOLIC BLOOD PRESSURE: 145 MMHG | TEMPERATURE: 98 F | OXYGEN SATURATION: 95 % | DIASTOLIC BLOOD PRESSURE: 70 MMHG | RESPIRATION RATE: 16 BRPM | HEART RATE: 98 BPM

## 2022-06-10 RX ORDER — FENOFIBRATE 160 MG/1
TABLET ORAL
Qty: 90 TABLET | Refills: 0 | Status: SHIPPED | OUTPATIENT
Start: 2022-06-10 | End: 2022-06-29 | Stop reason: SDUPTHER

## 2022-06-10 NOTE — PLAN OF CARE
Hgb 11.1 today. Outside of treatment parameters. Epo not administered. Pt will return in 2 weeks for repeat labs and retacrit pending results. Pt agreeable with poc. Verbalized understanding and discharged from unit in NAD.    Patient should be taking mycophenolate 1,000mg([id number]) twice daily. This dose change happened on 02/22/2018. Patient has a scheduled appointment for June 18 2018.

## 2022-06-13 ENCOUNTER — HOSPITAL ENCOUNTER (OUTPATIENT)
Facility: HOSPITAL | Age: 77
Discharge: HOME | End: 2022-06-14
Attending: EMERGENCY MEDICINE | Admitting: HOSPITALIST
Payer: MEDICARE

## 2022-06-13 ENCOUNTER — OFFICE VISIT (OUTPATIENT)
Dept: HEMATOLOGY/ONCOLOGY | Facility: CLINIC | Age: 77
End: 2022-06-13
Payer: MEDICARE

## 2022-06-13 VITALS
SYSTOLIC BLOOD PRESSURE: 132 MMHG | HEIGHT: 62 IN | BODY MASS INDEX: 24.48 KG/M2 | TEMPERATURE: 99 F | OXYGEN SATURATION: 97 % | HEART RATE: 107 BPM | DIASTOLIC BLOOD PRESSURE: 63 MMHG

## 2022-06-13 DIAGNOSIS — N18.9 CHRONIC KIDNEY DISEASE, UNSPECIFIED CKD STAGE: ICD-10-CM

## 2022-06-13 DIAGNOSIS — R07.81 PLEURITIC CHEST PAIN: ICD-10-CM

## 2022-06-13 DIAGNOSIS — R06.02 SOB (SHORTNESS OF BREATH): Primary | ICD-10-CM

## 2022-06-13 DIAGNOSIS — G72.9 MYOPATHY: ICD-10-CM

## 2022-06-13 DIAGNOSIS — D63.1 ANEMIA IN CHRONIC KIDNEY DISEASE, UNSPECIFIED CKD STAGE: ICD-10-CM

## 2022-06-13 DIAGNOSIS — R09.02 HYPOXIA: Primary | ICD-10-CM

## 2022-06-13 DIAGNOSIS — N18.9 ANEMIA IN CHRONIC KIDNEY DISEASE, UNSPECIFIED CKD STAGE: ICD-10-CM

## 2022-06-13 DIAGNOSIS — R06.02 SOB (SHORTNESS OF BREATH): ICD-10-CM

## 2022-06-13 DIAGNOSIS — R56.9 SEIZURE: ICD-10-CM

## 2022-06-13 DIAGNOSIS — R07.9 CHEST PAIN: ICD-10-CM

## 2022-06-13 PROBLEM — F41.1 GENERALIZED ANXIETY DISORDER: Status: ACTIVE | Noted: 2020-10-19

## 2022-06-13 LAB
ALBUMIN SERPL BCP-MCNC: 4.3 G/DL (ref 3.5–5.2)
ALLENS TEST: ABNORMAL
ALP SERPL-CCNC: 72 U/L (ref 55–135)
ALT SERPL W/O P-5'-P-CCNC: 21 U/L (ref 10–44)
ANION GAP SERPL CALC-SCNC: 15 MMOL/L (ref 8–16)
AST SERPL-CCNC: 31 U/L (ref 10–40)
BASOPHILS # BLD AUTO: 0.01 K/UL (ref 0–0.2)
BASOPHILS NFR BLD: 0.2 % (ref 0–1.9)
BILIRUB SERPL-MCNC: 0.4 MG/DL (ref 0.1–1)
BNP SERPL-MCNC: 30 PG/ML (ref 0–99)
BUN SERPL-MCNC: 20 MG/DL (ref 8–23)
CALCIUM SERPL-MCNC: 10.4 MG/DL (ref 8.7–10.5)
CHLORIDE SERPL-SCNC: 98 MMOL/L (ref 95–110)
CO2 SERPL-SCNC: 21 MMOL/L (ref 23–29)
CREAT SERPL-MCNC: 1.5 MG/DL (ref 0.5–1.4)
CTP QC/QA: YES
D DIMER PPP IA.FEU-MCNC: 0.35 MG/L FEU
DIFFERENTIAL METHOD: ABNORMAL
EOSINOPHIL # BLD AUTO: 0 K/UL (ref 0–0.5)
EOSINOPHIL NFR BLD: 0.2 % (ref 0–8)
ERYTHROCYTE [DISTWIDTH] IN BLOOD BY AUTOMATED COUNT: 12.9 % (ref 11.5–14.5)
EST. GFR  (AFRICAN AMERICAN): 39 ML/MIN/1.73 M^2
EST. GFR  (NON AFRICAN AMERICAN): 34 ML/MIN/1.73 M^2
FERRITIN SERPL-MCNC: 139 NG/ML (ref 20–300)
GLUCOSE SERPL-MCNC: 171 MG/DL (ref 70–110)
HCO3 UR-SCNC: 23.4 MMOL/L (ref 24–28)
HCT VFR BLD AUTO: 35.7 % (ref 37–48.5)
HGB BLD-MCNC: 11.8 G/DL (ref 12–16)
IMM GRANULOCYTES # BLD AUTO: 0.06 K/UL (ref 0–0.04)
IMM GRANULOCYTES NFR BLD AUTO: 0.9 % (ref 0–0.5)
IRON SERPL-MCNC: 74 UG/DL (ref 30–160)
LACTATE SERPL-SCNC: 2.7 MMOL/L (ref 0.5–2.2)
LYMPHOCYTES # BLD AUTO: 1.1 K/UL (ref 1–4.8)
LYMPHOCYTES NFR BLD: 16.6 % (ref 18–48)
MCH RBC QN AUTO: 33.2 PG (ref 27–31)
MCHC RBC AUTO-ENTMCNC: 33.1 G/DL (ref 32–36)
MCV RBC AUTO: 101 FL (ref 82–98)
MONOCYTES # BLD AUTO: 0.4 K/UL (ref 0.3–1)
MONOCYTES NFR BLD: 6.1 % (ref 4–15)
NEUTROPHILS # BLD AUTO: 5 K/UL (ref 1.8–7.7)
NEUTROPHILS NFR BLD: 76 % (ref 38–73)
NRBC BLD-RTO: 0 /100 WBC
PCO2 BLDA: 33.2 MMHG (ref 35–45)
PH SMN: 7.46 [PH] (ref 7.35–7.45)
PLATELET # BLD AUTO: 383 K/UL (ref 150–450)
PMV BLD AUTO: 9.2 FL (ref 9.2–12.9)
PO2 BLDA: 75 MMHG (ref 80–100)
POC BE: 0 MMOL/L
POC MOLECULAR INFLUENZA A AGN: NEGATIVE
POC MOLECULAR INFLUENZA B AGN: NEGATIVE
POC SATURATED O2: 96 % (ref 95–100)
POC TCO2: 24 MMOL/L (ref 23–27)
POCT GLUCOSE: 159 MG/DL (ref 70–110)
POCT GLUCOSE: 172 MG/DL (ref 70–110)
POTASSIUM SERPL-SCNC: 4.5 MMOL/L (ref 3.5–5.1)
PROT SERPL-MCNC: 8.7 G/DL (ref 6–8.4)
RBC # BLD AUTO: 3.55 M/UL (ref 4–5.4)
RETICS/RBC NFR AUTO: 2.9 % (ref 0.5–2.5)
SAMPLE: ABNORMAL
SARS-COV-2 RDRP RESP QL NAA+PROBE: NEGATIVE
SARS-COV-2 RDRP RESP QL NAA+PROBE: NEGATIVE
SATURATED IRON: 15 % (ref 20–50)
SITE: ABNORMAL
SODIUM SERPL-SCNC: 134 MMOL/L (ref 136–145)
T4 FREE SERPL-MCNC: 1.53 NG/DL (ref 0.71–1.51)
TOTAL IRON BINDING CAPACITY: 496 UG/DL (ref 250–450)
TRANSFERRIN SERPL-MCNC: 335 MG/DL (ref 200–375)
TROPONIN I SERPL DL<=0.01 NG/ML-MCNC: <0.006 NG/ML (ref 0–0.03)
TSH SERPL DL<=0.005 MIU/L-ACNC: 0.53 UIU/ML (ref 0.4–4)
WBC # BLD AUTO: 6.52 K/UL (ref 3.9–12.7)

## 2022-06-13 PROCEDURE — 3072F LOW RISK FOR RETINOPATHY: CPT | Mod: CPTII,S$GLB,, | Performed by: INTERNAL MEDICINE

## 2022-06-13 PROCEDURE — 96374 THER/PROPH/DIAG INJ IV PUSH: CPT | Mod: 59

## 2022-06-13 PROCEDURE — 1126F AMNT PAIN NOTED NONE PRSNT: CPT | Mod: CPTII,S$GLB,, | Performed by: INTERNAL MEDICINE

## 2022-06-13 PROCEDURE — 25000003 PHARM REV CODE 250: Performed by: HOSPITALIST

## 2022-06-13 PROCEDURE — 1157F ADVNC CARE PLAN IN RCRD: CPT | Mod: CPTII,S$GLB,, | Performed by: INTERNAL MEDICINE

## 2022-06-13 PROCEDURE — 1126F PR PAIN SEVERITY QUANTIFIED, NO PAIN PRESENT: ICD-10-PCS | Mod: CPTII,S$GLB,, | Performed by: INTERNAL MEDICINE

## 2022-06-13 PROCEDURE — 99499 RISK ADDL DX/OHS AUDIT: ICD-10-PCS | Mod: S$GLB,,, | Performed by: INTERNAL MEDICINE

## 2022-06-13 PROCEDURE — 84480 ASSAY TRIIODOTHYRONINE (T3): CPT | Performed by: EMERGENCY MEDICINE

## 2022-06-13 PROCEDURE — 3075F SYST BP GE 130 - 139MM HG: CPT | Mod: CPTII,S$GLB,, | Performed by: INTERNAL MEDICINE

## 2022-06-13 PROCEDURE — 85025 COMPLETE CBC W/AUTO DIFF WBC: CPT | Performed by: EMERGENCY MEDICINE

## 2022-06-13 PROCEDURE — 3288F PR FALLS RISK ASSESSMENT DOCUMENTED: ICD-10-PCS | Mod: CPTII,S$GLB,, | Performed by: INTERNAL MEDICINE

## 2022-06-13 PROCEDURE — 96366 THER/PROPH/DIAG IV INF ADDON: CPT

## 2022-06-13 PROCEDURE — 3072F PR LOW RISK FOR RETINOPATHY: ICD-10-PCS | Mod: CPTII,S$GLB,, | Performed by: INTERNAL MEDICINE

## 2022-06-13 PROCEDURE — 99499 UNLISTED E&M SERVICE: CPT | Mod: S$GLB,,, | Performed by: INTERNAL MEDICINE

## 2022-06-13 PROCEDURE — 25000242 PHARM REV CODE 250 ALT 637 W/ HCPCS: Performed by: EMERGENCY MEDICINE

## 2022-06-13 PROCEDURE — 99999 PR PBB SHADOW E&M-EST. PATIENT-LVL IV: ICD-10-PCS | Mod: PBBFAC,,, | Performed by: INTERNAL MEDICINE

## 2022-06-13 PROCEDURE — 3288F FALL RISK ASSESSMENT DOCD: CPT | Mod: CPTII,S$GLB,, | Performed by: INTERNAL MEDICINE

## 2022-06-13 PROCEDURE — 82962 GLUCOSE BLOOD TEST: CPT

## 2022-06-13 PROCEDURE — 84439 ASSAY OF FREE THYROXINE: CPT | Performed by: EMERGENCY MEDICINE

## 2022-06-13 PROCEDURE — 1101F PR PT FALLS ASSESS DOC 0-1 FALLS W/OUT INJ PAST YR: ICD-10-PCS | Mod: CPTII,S$GLB,, | Performed by: INTERNAL MEDICINE

## 2022-06-13 PROCEDURE — 63600175 PHARM REV CODE 636 W HCPCS: Performed by: EMERGENCY MEDICINE

## 2022-06-13 PROCEDURE — G0378 HOSPITAL OBSERVATION PER HR: HCPCS

## 2022-06-13 PROCEDURE — 93010 ELECTROCARDIOGRAM REPORT: CPT | Mod: ,,, | Performed by: INTERNAL MEDICINE

## 2022-06-13 PROCEDURE — 82803 BLOOD GASES ANY COMBINATION: CPT

## 2022-06-13 PROCEDURE — 83605 ASSAY OF LACTIC ACID: CPT | Performed by: EMERGENCY MEDICINE

## 2022-06-13 PROCEDURE — 25500020 PHARM REV CODE 255: Performed by: EMERGENCY MEDICINE

## 2022-06-13 PROCEDURE — 82728 ASSAY OF FERRITIN: CPT | Performed by: EMERGENCY MEDICINE

## 2022-06-13 PROCEDURE — 93010 EKG 12-LEAD: ICD-10-PCS | Mod: ,,, | Performed by: INTERNAL MEDICINE

## 2022-06-13 PROCEDURE — 84443 ASSAY THYROID STIM HORMONE: CPT | Performed by: EMERGENCY MEDICINE

## 2022-06-13 PROCEDURE — 3078F DIAST BP <80 MM HG: CPT | Mod: CPTII,S$GLB,, | Performed by: INTERNAL MEDICINE

## 2022-06-13 PROCEDURE — 84466 ASSAY OF TRANSFERRIN: CPT | Performed by: EMERGENCY MEDICINE

## 2022-06-13 PROCEDURE — 80053 COMPREHEN METABOLIC PANEL: CPT | Performed by: EMERGENCY MEDICINE

## 2022-06-13 PROCEDURE — 99900035 HC TECH TIME PER 15 MIN (STAT)

## 2022-06-13 PROCEDURE — 94761 N-INVAS EAR/PLS OXIMETRY MLT: CPT

## 2022-06-13 PROCEDURE — 1157F PR ADVANCE CARE PLAN OR EQUIV PRESENT IN MEDICAL RECORD: ICD-10-PCS | Mod: CPTII,S$GLB,, | Performed by: INTERNAL MEDICINE

## 2022-06-13 PROCEDURE — 96365 THER/PROPH/DIAG IV INF INIT: CPT | Mod: 59

## 2022-06-13 PROCEDURE — 87502 INFLUENZA DNA AMP PROBE: CPT

## 2022-06-13 PROCEDURE — 85379 FIBRIN DEGRADATION QUANT: CPT | Performed by: EMERGENCY MEDICINE

## 2022-06-13 PROCEDURE — 94640 AIRWAY INHALATION TREATMENT: CPT

## 2022-06-13 PROCEDURE — 84484 ASSAY OF TROPONIN QUANT: CPT | Performed by: EMERGENCY MEDICINE

## 2022-06-13 PROCEDURE — 99291 CRITICAL CARE FIRST HOUR: CPT | Mod: 25,CS

## 2022-06-13 PROCEDURE — 99214 OFFICE O/P EST MOD 30 MIN: CPT | Mod: S$GLB,,, | Performed by: INTERNAL MEDICINE

## 2022-06-13 PROCEDURE — 1101F PT FALLS ASSESS-DOCD LE1/YR: CPT | Mod: CPTII,S$GLB,, | Performed by: INTERNAL MEDICINE

## 2022-06-13 PROCEDURE — 87040 BLOOD CULTURE FOR BACTERIA: CPT | Performed by: EMERGENCY MEDICINE

## 2022-06-13 PROCEDURE — 93005 ELECTROCARDIOGRAM TRACING: CPT

## 2022-06-13 PROCEDURE — U0002 COVID-19 LAB TEST NON-CDC: HCPCS | Performed by: EMERGENCY MEDICINE

## 2022-06-13 PROCEDURE — 99214 PR OFFICE/OUTPT VISIT, EST, LEVL IV, 30-39 MIN: ICD-10-PCS | Mod: S$GLB,,, | Performed by: INTERNAL MEDICINE

## 2022-06-13 PROCEDURE — 83880 ASSAY OF NATRIURETIC PEPTIDE: CPT | Performed by: EMERGENCY MEDICINE

## 2022-06-13 PROCEDURE — 36600 WITHDRAWAL OF ARTERIAL BLOOD: CPT

## 2022-06-13 PROCEDURE — 3078F PR MOST RECENT DIASTOLIC BLOOD PRESSURE < 80 MM HG: ICD-10-PCS | Mod: CPTII,S$GLB,, | Performed by: INTERNAL MEDICINE

## 2022-06-13 PROCEDURE — 99999 PR PBB SHADOW E&M-EST. PATIENT-LVL IV: CPT | Mod: PBBFAC,,, | Performed by: INTERNAL MEDICINE

## 2022-06-13 PROCEDURE — 83010 ASSAY OF HAPTOGLOBIN QUANT: CPT | Performed by: EMERGENCY MEDICINE

## 2022-06-13 PROCEDURE — 85045 AUTOMATED RETICULOCYTE COUNT: CPT | Performed by: EMERGENCY MEDICINE

## 2022-06-13 PROCEDURE — 3075F PR MOST RECENT SYSTOLIC BLOOD PRESS GE 130-139MM HG: ICD-10-PCS | Mod: CPTII,S$GLB,, | Performed by: INTERNAL MEDICINE

## 2022-06-13 RX ORDER — LEVOTHYROXINE SODIUM 50 UG/1
50 TABLET ORAL
Status: DISCONTINUED | OUTPATIENT
Start: 2022-06-14 | End: 2022-06-14 | Stop reason: HOSPADM

## 2022-06-13 RX ORDER — TALC
6 POWDER (GRAM) TOPICAL NIGHTLY PRN
Status: DISCONTINUED | OUTPATIENT
Start: 2022-06-13 | End: 2022-06-14 | Stop reason: HOSPADM

## 2022-06-13 RX ORDER — PREDNISONE 5 MG/1
5 TABLET ORAL DAILY
Status: DISCONTINUED | OUTPATIENT
Start: 2022-06-14 | End: 2022-06-14 | Stop reason: HOSPADM

## 2022-06-13 RX ORDER — PROCHLORPERAZINE EDISYLATE 5 MG/ML
5 INJECTION INTRAMUSCULAR; INTRAVENOUS EVERY 6 HOURS PRN
Status: DISCONTINUED | OUTPATIENT
Start: 2022-06-13 | End: 2022-06-14 | Stop reason: HOSPADM

## 2022-06-13 RX ORDER — POLYETHYLENE GLYCOL 3350 17 G/17G
17 POWDER, FOR SOLUTION ORAL DAILY
Status: DISCONTINUED | OUTPATIENT
Start: 2022-06-14 | End: 2022-06-14 | Stop reason: HOSPADM

## 2022-06-13 RX ORDER — ONDANSETRON 2 MG/ML
4 INJECTION INTRAMUSCULAR; INTRAVENOUS EVERY 8 HOURS PRN
Status: DISCONTINUED | OUTPATIENT
Start: 2022-06-13 | End: 2022-06-14 | Stop reason: HOSPADM

## 2022-06-13 RX ORDER — IBUPROFEN 200 MG
24 TABLET ORAL
Status: DISCONTINUED | OUTPATIENT
Start: 2022-06-13 | End: 2022-06-14 | Stop reason: HOSPADM

## 2022-06-13 RX ORDER — LEVOFLOXACIN 5 MG/ML
750 INJECTION, SOLUTION INTRAVENOUS
Status: DISCONTINUED | OUTPATIENT
Start: 2022-06-13 | End: 2022-06-14 | Stop reason: HOSPADM

## 2022-06-13 RX ORDER — SODIUM CHLORIDE 0.9 % (FLUSH) 0.9 %
10 SYRINGE (ML) INJECTION EVERY 8 HOURS PRN
Status: DISCONTINUED | OUTPATIENT
Start: 2022-06-13 | End: 2022-06-14 | Stop reason: HOSPADM

## 2022-06-13 RX ORDER — IPRATROPIUM BROMIDE AND ALBUTEROL SULFATE 2.5; .5 MG/3ML; MG/3ML
3 SOLUTION RESPIRATORY (INHALATION) EVERY 4 HOURS PRN
Status: DISCONTINUED | OUTPATIENT
Start: 2022-06-13 | End: 2022-06-14 | Stop reason: HOSPADM

## 2022-06-13 RX ORDER — ACETAMINOPHEN 325 MG/1
650 TABLET ORAL EVERY 6 HOURS PRN
Status: DISCONTINUED | OUTPATIENT
Start: 2022-06-13 | End: 2022-06-14 | Stop reason: HOSPADM

## 2022-06-13 RX ORDER — FOLIC ACID 1 MG/1
1000 TABLET ORAL DAILY
Status: DISCONTINUED | OUTPATIENT
Start: 2022-06-14 | End: 2022-06-14 | Stop reason: HOSPADM

## 2022-06-13 RX ORDER — NALOXONE HCL 0.4 MG/ML
0.02 VIAL (ML) INJECTION
Status: DISCONTINUED | OUTPATIENT
Start: 2022-06-13 | End: 2022-06-14 | Stop reason: HOSPADM

## 2022-06-13 RX ORDER — MAG HYDROX/ALUMINUM HYD/SIMETH 200-200-20
30 SUSPENSION, ORAL (FINAL DOSE FORM) ORAL 4 TIMES DAILY PRN
Status: DISCONTINUED | OUTPATIENT
Start: 2022-06-13 | End: 2022-06-14 | Stop reason: HOSPADM

## 2022-06-13 RX ORDER — CARVEDILOL 12.5 MG/1
25 TABLET ORAL 2 TIMES DAILY
Status: DISCONTINUED | OUTPATIENT
Start: 2022-06-13 | End: 2022-06-14 | Stop reason: HOSPADM

## 2022-06-13 RX ORDER — GLUCAGON 1 MG
1 KIT INJECTION
Status: DISCONTINUED | OUTPATIENT
Start: 2022-06-13 | End: 2022-06-14 | Stop reason: HOSPADM

## 2022-06-13 RX ORDER — IPRATROPIUM BROMIDE 0.5 MG/2.5ML
1 SOLUTION RESPIRATORY (INHALATION)
Status: COMPLETED | OUTPATIENT
Start: 2022-06-13 | End: 2022-06-13

## 2022-06-13 RX ORDER — SIMETHICONE 80 MG
1 TABLET,CHEWABLE ORAL 4 TIMES DAILY PRN
Status: DISCONTINUED | OUTPATIENT
Start: 2022-06-13 | End: 2022-06-14 | Stop reason: HOSPADM

## 2022-06-13 RX ORDER — IBUPROFEN 200 MG
16 TABLET ORAL
Status: DISCONTINUED | OUTPATIENT
Start: 2022-06-13 | End: 2022-06-14 | Stop reason: HOSPADM

## 2022-06-13 RX ORDER — ALBUTEROL SULFATE 2.5 MG/.5ML
15 SOLUTION RESPIRATORY (INHALATION)
Status: COMPLETED | OUTPATIENT
Start: 2022-06-13 | End: 2022-06-13

## 2022-06-13 RX ORDER — NIFEDIPINE 30 MG/1
60 TABLET, EXTENDED RELEASE ORAL DAILY
Status: DISCONTINUED | OUTPATIENT
Start: 2022-06-14 | End: 2022-06-14 | Stop reason: HOSPADM

## 2022-06-13 RX ORDER — LEVETIRACETAM 100 MG/ML
250 SOLUTION ORAL 2 TIMES DAILY
Status: DISCONTINUED | OUTPATIENT
Start: 2022-06-13 | End: 2022-06-14 | Stop reason: HOSPADM

## 2022-06-13 RX ORDER — DEXAMETHASONE SODIUM PHOSPHATE 4 MG/ML
12 INJECTION, SOLUTION INTRA-ARTICULAR; INTRALESIONAL; INTRAMUSCULAR; INTRAVENOUS; SOFT TISSUE
Status: COMPLETED | OUTPATIENT
Start: 2022-06-13 | End: 2022-06-13

## 2022-06-13 RX ORDER — ATORVASTATIN CALCIUM 10 MG/1
20 TABLET, FILM COATED ORAL DAILY
Status: DISCONTINUED | OUTPATIENT
Start: 2022-06-14 | End: 2022-06-14 | Stop reason: HOSPADM

## 2022-06-13 RX ADMIN — APIXABAN 5 MG: 5 TABLET, FILM COATED ORAL at 09:06

## 2022-06-13 RX ADMIN — LEVOFLOXACIN 750 MG: 750 INJECTION, SOLUTION INTRAVENOUS at 05:06

## 2022-06-13 RX ADMIN — LEVETIRACETAM 250 MG: 100 SOLUTION ORAL at 09:06

## 2022-06-13 RX ADMIN — IOHEXOL 75 ML: 300 INJECTION, SOLUTION INTRAVENOUS at 04:06

## 2022-06-13 RX ADMIN — DEXAMETHASONE SODIUM PHOSPHATE 12 MG: 4 INJECTION INTRA-ARTICULAR; INTRALESIONAL; INTRAMUSCULAR; INTRAVENOUS; SOFT TISSUE at 06:06

## 2022-06-13 RX ADMIN — ALBUTEROL SULFATE 15 MG: 2.5 SOLUTION RESPIRATORY (INHALATION) at 06:06

## 2022-06-13 RX ADMIN — IPRATROPIUM BROMIDE 1 MG: 0.5 SOLUTION RESPIRATORY (INHALATION) at 06:06

## 2022-06-13 RX ADMIN — CARVEDILOL 25 MG: 12.5 TABLET, FILM COATED ORAL at 09:06

## 2022-06-13 NOTE — Clinical Note
CBC q2wks STANDING ( on FRIDAYS in AM) Cont EPO  inj q 2wks( pending lab parameters) on FRI Ferrtin, TIBC , Fe and CBC prior to fu 3mos  CHECK pox STAT

## 2022-06-13 NOTE — ED PROVIDER NOTES
"Encounter Date: 6/13/2022       History     Chief Complaint   Patient presents with    Shortness of Breath     Pt sent down from hematology for c/o increased SOB, CP, weakness and shaking. Pt sent down for evaluation on oxygen       76 y.o. female Past Medical History:  No date: Acid reflux  No date: Allergy  No date: Alopecia  No date: Anemia  9/22/2016: Anemia in CKD (chronic kidney disease)  No date: Anxiety  No date: Arthritis  No date: Back pain  No date: Cataract  No date: Chronic kidney disease  No date: Controlled type 2 diabetes mellitus with left eye affected   by mild nonproliferative retinopathy without macular edema, without   long-term current use of insulin  No date: Controlled type 2 diabetes mellitus with neurologic   complication, without long-term current use of insulin  No date: Depression  No date: Diabetes mellitus, type 2  as a child : Eye injury      Comment:  k-abrasion  od  No date: Hyperlipidemia  No date: Hypertension  No date: Hypothyroidism  No date: Immune deficiency disorder  No date: Immune disorder  03/12/2021: LOC (loss of consciousness)      Comment:  at home  9/6/2012: Myalgia and myositis  No date: Osteoporosis  No date: Polyneuropathy  7/10/2021: Pulmonary embolism  No date: Renal manifestation of secondary diabetes mellitus  No date: Sarcoidosis  No date: Seizure  No date: Type 2 diabetes mellitus  No date: Ulcer      Comment:  no cancer  No date: Urinary incontinence     Send from Heme/onc clinic for evaluation of sob.  The patient informs me she has a history of prior multiple PEs on the right side she is on Eliquis which she states she takes twice a day and has been compliant with medications she denies missing any doses. Pt c/o increasing sob over the last week coupled with pleuritic cp.        Saw Dr. Pacheco with heme/onc today for her chronic anemia in setting of CKD with the following HPI:  " The patient is seen today for f/u  chronic anemia in CKD undergoing EPO " "injections.The patient reports that she has been diagnosed with JOLLY in the past.  She has been on oral iron supplementation therapy, but could not tolerate or did not respond, she is uncertainShe also has  undergone intermittent IV iron therapy.  She is followed by GI and has undergone a colonoscopy earlier this year and was diagnosed with hemorrhoids for which she underwent banding procedure.  No melena, hematochezia,change in bowel habits.  She has also been diagnosed with B12 deficiency in the past, but reports she did not respond to B12 injections. No history of blood transfusions.  She is a Cheondoism.  She reports that she remembers getting injections in the 1970s when her blood count was low.   She is followed by Rheumatology for history of sarcoidosis with associated myopathy and arthropathy. She has been treated in the  past with methotrexate and Plaquenil, both of which were ineffective Cellcept and imuran caused some unknown side effect. She is followed by PCP for DM"          Interval Hx;    She reports worsening SOB since last week  She doesn't feel good  She reports generalized weakness  She reports swelling in legs         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, without long-term current use of insulin     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, without long-term current use of insulin     Depression     Diabetes mellitus, type 2     Eye injury as a child     k-abrasion  od    Hyperlipidemia     Hypertension     " Hypothyroidism     Immune deficiency disorder     Immune disorder     LOC (loss of consciousness) 2021    at home    Myalgia and myositis 2012    Osteoporosis     Polyneuropathy     Pulmonary embolism 7/10/2021    Renal manifestation of secondary diabetes mellitus     Sarcoidosis     Seizure     Type 2 diabetes mellitus     Ulcer     no cancer    Urinary incontinence      Past Surgical History:   Procedure Laterality Date    CARPAL TUNNEL RELEASE      Rt wrist    CATARACT EXTRACTION W/  INTRAOCULAR LENS IMPLANT Right 2015    Dr. Azevedo    CATARACT EXTRACTION W/  INTRAOCULAR LENS IMPLANT Left 2015    Dr. Azevedo    CERVICAL SPINE SURGERY       SECTION      CHOLECYSTECTOMY      INJECTION OF ANESTHETIC AGENT AROUND NERVE Left 2020    Procedure: BLOCK, NERVE LEFT FEMORAL AND OBTURATOR;  Surgeon: Alfonso Richards MD;  Location: [facility];  Service: Pain Management;  Laterality: Left;  NEEDS CONSENT    INJECTION OF JOINT Left 3/21/2019    Procedure: Injection, Joint  fLUOROSCOPIC jOINT iNJECTION (hIP iNJECTION) LEFT ROCH BURSA AS WELL LEFT TROCHANTERIC BURSA;  Surgeon: Alfonso Richards MD;  Location: [facility];  Service: Pain Management;  Laterality: Left;  NEEDS CONSENT, DIABETIC    INJECTION OF JOINT Left 2019    Procedure: Injection, Joint FLUOROSCOPIC JOINT INJECTION (HIP INJECTION) LEFT HIP;  Surgeon: Alfonso Richards MD;  Location: [facility];  Service: Pain Management;  Laterality: Left;  NEEDS CONSENT    INJECTION OF JOINT Left 2019    Procedure: INJECTION, JOINT;  Surgeon: Alfonso Richards MD;  Location: [facility];  Service: Pain Management;  Laterality: Left;  Left Hip and Left GTB Injections    INJECTION OF JOINT Left 2020    Procedure: INJECTION, JOINT, LEFT HIP and LEFT GREATER TROCHANTERIC BURSA;  Surgeon: Alfonso Richards MD;  Location: [facility];  Service: Pain Management;  Laterality: Left;  INJECTION, JOINT, LEFT HIP and LEFT  GREATER TROCHANTERIC BURSA    INJECTION OF JOINT Left 9/3/2020    Procedure: INJECTION, JOINT, LEFT SI;  Surgeon: Alfonso Richards MD;  Location: [facility];  Service: Pain Management;  Laterality: Left;  INJECTION, JOINT, LEFT SI    TRANSFORAMINAL EPIDURAL INJECTION OF STEROID Bilateral 12/5/2019    Procedure: INJECTION, STEROID, EPIDURAL, TRANSFORAMINAL APPROACH;  Surgeon: Alfonso Richards MD;  Location: [facility];  Service: Pain Management;  Laterality: Bilateral;  B/L TF RHIANNA L5  Consent Needed    TRANSFORAMINAL EPIDURAL INJECTION OF STEROID Bilateral 6/29/2020    Procedure: INJECTION, STEROID, EPIDURAL, TRANSFORAMINAL APPROACH L5/S1;  Surgeon: Alfonso Richards MD;  Location: [facility];  Service: Pain Management;  Laterality: Bilateral;  B/L TF RHIANNA L5/S1    TUBAL LIGATION       Family History   Problem Relation Age of Onset    Hypertension Mother     Cataracts Mother     No Known Problems Father     Hypertension Maternal Grandmother     Glaucoma Sister     Arthritis Sister     No Known Problems Brother     No Known Problems Maternal Aunt     No Known Problems Maternal Uncle     No Known Problems Paternal Aunt     No Known Problems Paternal Uncle     No Known Problems Maternal Grandfather     No Known Problems Paternal Grandmother     No Known Problems Paternal Grandfather     Kidney failure Sister     Hepatitis Sister     Cancer Sister         bone cancer     Immunodeficiency Sister     Diabetes Son     Hypertension Son     Lupus Neg Hx     Rheum arthritis Neg Hx     Amblyopia Neg Hx     Blindness Neg Hx     Macular degeneration Neg Hx     Retinal detachment Neg Hx     Strabismus Neg Hx     Stroke Neg Hx     Thyroid disease Neg Hx     Endometrial cancer Neg Hx     Vaginal cancer Neg Hx     Cervical cancer Neg Hx      Social History     Tobacco Use    Smoking status: Never Smoker    Smokeless tobacco: Never Used   Substance Use Topics    Alcohol use: No    Drug use: No      Review of Systems   Constitutional: Negative for fever.   HENT: Negative for sore throat.    Respiratory: Positive for shortness of breath.    Cardiovascular: Negative for chest pain.   Gastrointestinal: Negative for nausea.   Genitourinary: Negative for dysuria.   Musculoskeletal: Negative for back pain.   Skin: Negative for rash.   Neurological: Negative for weakness.   Hematological: Does not bruise/bleed easily.   All other systems reviewed and are negative.      Physical Exam     Initial Vitals [06/13/22 1441]   BP Pulse Resp Temp SpO2   (!) 177/97 (!) 129 (!) 24 98.1 °F (36.7 °C) 98 %      MAP       --         Physical Exam    Nursing note and vitals reviewed.  Constitutional: She appears well-developed and well-nourished.   HENT:   Head: Normocephalic and atraumatic.   Eyes: Conjunctivae and EOM are normal. Pupils are equal, round, and reactive to light.   Neck:   Normal range of motion.  Cardiovascular: Regular rhythm.   tachy   Pulmonary/Chest: She is in respiratory distress.   Abdominal: She exhibits no distension.   Musculoskeletal:         General: Normal range of motion.      Cervical back: Normal range of motion.     Neurological: She is alert. No cranial nerve deficit. GCS score is 15. GCS eye subscore is 4. GCS verbal subscore is 5. GCS motor subscore is 6.   Skin: Skin is warm and dry.   Psychiatric: She has a normal mood and affect. Thought content normal.     tachypneic, accessory muscle use.  No LE edema, no jvd    ED Course   Procedures  Labs Reviewed   CBC W/ AUTO DIFFERENTIAL - Abnormal; Notable for the following components:       Result Value    RBC 3.55 (*)     Hemoglobin 11.8 (*)     Hematocrit 35.7 (*)      (*)     MCH 33.2 (*)     Immature Granulocytes 0.9 (*)     Immature Grans (Abs) 0.06 (*)     Gran % 76.0 (*)     Lymph % 16.6 (*)     All other components within normal limits   COMPREHENSIVE METABOLIC PANEL - Abnormal; Notable for the following components:    Sodium 134 (*)      CO2 21 (*)     Glucose 171 (*)     Creatinine 1.5 (*)     Total Protein 8.7 (*)     eGFR if  39 (*)     eGFR if non  34 (*)     All other components within normal limits   IRON AND TIBC - Abnormal; Notable for the following components:    TIBC 496 (*)     Saturated Iron 15 (*)     All other components within normal limits   RETICULOCYTES - Abnormal; Notable for the following components:    Retic 2.9 (*)     All other components within normal limits   T4, FREE - Abnormal; Notable for the following components:    Free T4 1.53 (*)     All other components within normal limits   LACTIC ACID, PLASMA - Abnormal; Notable for the following components:    Lactate (Lactic Acid) 2.7 (*)     All other components within normal limits   POCT GLUCOSE - Abnormal; Notable for the following components:    POCT Glucose 159 (*)     All other components within normal limits   ISTAT PROCEDURE - Abnormal; Notable for the following components:    POC PH 7.456 (*)     POC PCO2 33.2 (*)     POC PO2 75 (*)     POC HCO3 23.4 (*)     All other components within normal limits   CULTURE, BLOOD   CULTURE, BLOOD   TROPONIN I   B-TYPE NATRIURETIC PEPTIDE   D DIMER, QUANTITATIVE   FERRITIN   TSH   HAPTOGLOBIN   T3   LACTIC ACID, PLASMA   SARS-COV-2 RDRP GENE   SARS-COV-2 RDRP GENE   POCT INFLUENZA A/B MOLECULAR     EKG Readings: (Independently Interpreted)   Hr 122, sinus tach, nl axis/intervals, no wilfredo/twi,n on acute, no stemi.        Imaging Results          CTA Chest Non-Coronary (PE Study) (Final result)  Result time 06/13/22 17:23:26    Final result by Hui Cohn MD (06/13/22 17:23:26)                 Impression:      No evidence of acute pulmonary embolism.    Small hiatal hernia.    Hepatic steatosis.      Electronically signed by: Hui Cohn  Date:    06/13/2022  Time:    17:23             Narrative:    EXAMINATION:  CT PULMONARY ANGIOGRAM WITH CONTRAST    CLINICAL HISTORY:  Shortness of  breath.    TECHNIQUE:  CT of the chest with intravenous contrast for pulmonary artery angiogram was performed. Contiguous axial 1.25 mm images followed by 10 mm reconstructions with multiplanar and MIP reformations of the pulmonary arteries. No 3D post-angiographic imaging was performed on an independent workstation and reviewed.  75 ml of Omnipaque 350 was injected.    COMPARISON:  CTA 07/10/2021    FINDINGS:  There is no evidence of pulmonary artery filling defect to suggest pulmonary embolism. There is no aortic aneurysm or aortic dissection.    A calcified granuloma is seen in the periphery of the right upper lobe.  Mild bibasilar atelectatic changes are present.  There is moderate asymmetric elevation of the right hemidiaphragm.  Mild asymmetric elevation of right hemidiaphragm is noted.    There is no evidence of mediastinal, hilar, or axillary adenopathy.    There is no pleural or pericardial effusion.    The heart size is within normal limits.    In the visualized upper abdomen, there is fatty infiltration liver.  The gallbladder surgically absent.    Mild kyphoscoliosis is present with convexity to the right.  There are marginal osteophytes, and endplate sclerosis.  There is a non-acute moderate wedge compression fracture of the T4 vertebral body.    There is a small hiatal hernia.                               X-Ray Chest AP Portable (Final result)  Result time 06/13/22 15:02:49    Final result by Rudy Barr III, MD (06/13/22 15:02:49)                 Impression:      No acute process seen.      Electronically signed by: Rudy Barr MD  Date:    06/13/2022  Time:    15:02             Narrative:    EXAMINATION:  XR CHEST AP PORTABLE    CLINICAL HISTORY:  Chest Pain;    FINDINGS:  Heart size is normal.  Lungs are clear.  The bones showed DJD.  There is postoperative change of the C-spine with hardware.                                 Medications   levoFLOXacin 750 mg/150 mL IVPB 750 mg (750 mg  Intravenous New Bag 6/13/22 1719)   iohexoL (OMNIPAQUE 300) injection 75 mL (75 mLs Intravenous Given 6/13/22 1649)   dexamethasone injection 12 mg (12 mg Intravenous Given 6/13/22 1807)   albuterol sulfate nebulizer solution 15 mg (15 mg Nebulization Given 6/13/22 1804)   ipratropium 0.02 % nebulizer solution 1 mg (1 mg Nebulization Given 6/13/22 1805)                Attending Attestation:         Attending Critical Care:   Critical Care Times:   Direct Patient Care (initial evaluation, reassessments, and time considering the case)................................................................30 minutes.   Additional History from reviewing old medical records or taking additional history from the family, EMS, PCP, etc.......................10 minutes.   Ordering, Reviewing, and Interpreting Diagnostic Studies...............................................................................................................10 minutes.   Documentation..................................................................................................................................................................................10 minutes.   Consultation with other Physicians. .................................................................................................................................................10 minutes.   ==============================================================  · Total Critical Care Time - exclusive of procedural time: 70 minutes.  ==============================================================                unclear etiology of patient's shortness of breath, hemoglobin is actually higher than her baseline, she does have a history of PEs and could always have recurrence and treatment failure or other etiology.  Have ordered CTA of chest screening labs will reassess      Pt o2 sat drops as low as 91-92% on RA, have placed her on oxygen 2L by NC.   Clinical Impression:   Final  diagnoses:  [R06.02] SOB (shortness of breath)  [R07.81] Pleuritic chest pain  [R09.02] Hypoxia (Primary)          ED Disposition Condition    Observation               Mami Reis MD  06/13/22 9940

## 2022-06-13 NOTE — PROGRESS NOTES
Subjective:        Patient ID: Regino Lawrence is a 76 y.o. female.    Chief Complaint: Follow-up anemia      Diagnosis: Anemia in CKD  Patient is a Confucianist  .  Prior Hx;  The patient is seen today for f/u  chronic anemia in CKD undergoing EPO injections.The patient reports that she has been diagnosed with JOLLY in the past.  She has been on oral iron supplementation therapy, but could not tolerate or did not respond, she is uncertainShe also has  undergone intermittent IV iron therapy.  She is followed by GI and has undergone a colonoscopy earlier this year and was diagnosed with hemorrhoids for which she underwent banding procedure.  No melena, hematochezia,change in bowel habits.  She has also been diagnosed with B12 deficiency in the past, but reports she did not respond to B12 injections. No history of blood transfusions.  She is a Confucianist.  She reports that she remembers getting injections in the 1970s when her blood count was low.   She is followed by Rheumatology for history of sarcoidosis with associated myopathy and arthropathy. She has been treated in the  past with methotrexate and Plaquenil, both of which were ineffectiveCellcept and imuran caused some unknown side effect. She is followed by PCP for DM        Interval Hx;    She reports worsening SOB since last week  She doesn't feel good  She reports generalized weakness  She reports swelling in legs   She reports CP, weakness and shaking  She continues with Chronic diffuse arthralgias  Followed by pain management and Rheumatology   In [facility]  She is undergoing epo inj q 2wks  Hb  10.5g/dL on 3/4/22   No CP/cough  Chronic Mild fatigue  Mild FAUSTIN-stable  No melena, hematochezia or change in bowel habits  She also has  undergone intermittent IV iron therapy  She also has history of cervical spinal stenosis s/p posterior C3-C7 laminectomy and fusion on 11/16/15 by Dr. Conner.  She has received COVD vaccination           PAST  "MEDICAL HISTORY:  Acid reflux, alopecia, anemia, anxiety disorder, chronic  kidney disease, depression, diabetes mellitus type 2, hyperlipidemia,  hypertension, hypothyroidism, osteoporosis, sarcoidosis.    PAST SURGICAL HISTORY:  Cholecystectomy, , tubal ligation, carpal tunnel release, cataracts.    FAMILY HISTORY: Unremarkable for cancer. Significant for HTN.       Review of Systems   Constitutional: Positive for fatigue (chronic, mild). Negative for activity change and appetite change.   HENT: Negative for hearing loss and nosebleeds.    Eyes: Negative for visual disturbance.   Respiratory: Positive for shortness of breath (progressive). Negative for cough.    Cardiovascular: Positive for chest pain. Negative for leg swelling.   Gastrointestinal: Negative for abdominal pain, constipation, diarrhea and nausea.   Genitourinary: Negative for flank pain and urgency.   Musculoskeletal: Positive for arthralgias and back pain. Negative for gait problem and joint swelling.   Skin: Negative for rash.        No petechiae, ecchymoses   Neurological: Positive for weakness. Negative for light-headedness and headaches.   Hematological: Negative for adenopathy. Does not bruise/bleed easily.       Objective:       Vitals:    22 1338   BP: 132/63   BP Location: Left arm   Patient Position: Sitting   Pulse: 107   Temp: 98.6 °F (37 °C)   TempSrc: Oral   SpO2: 97%   Height: 5' 2" (1.575 m)         .Physical Exam   Constitutional: She is oriented to person, place, and time. She appears well-developed and well-nourished.   HENT:   Head: Normocephalic.   Eyes: Conjunctivae and lids are normal.No scleral icterus.   Neck: Normal range of motion. Neck supple. No thyromegaly present.   Cardiovascular: Normal rate, regular rhythm and normal heart sounds.    No murmur heard.  Pulmonary/Chest: Breath sounds normal. She has no wheezes. She has no rales.   Abdominal: Soft. Bowel sounds are normal. There is no tenderness. There " is no rebound and no guarding.   Musculoskeletal: Ambulates w/assistance of walker   Neurological: She is alert and oriented to person, place, and time. No cranial nerve deficit.  Skin: Skin is warm and dry. No ecchymosis, no petechiae and no rash noted. No erythema.   Psychiatric: She has a normal mood and affect.               Lab Results   Component Value Date    WBC 6.52 06/13/2022    HGB 11.8 (L) 06/13/2022    HCT 35.7 (L) 06/13/2022     (H) 06/13/2022     06/13/2022     Lab Results   Component Value Date    IRON 74 06/13/2022    TIBC 496 (H) 06/13/2022    FERRITIN 139 06/13/2022     SPEP-nl      CT renal 3/6/2017   IMPRESSION:  1.  No renal, ureteral or bladder calculi.  No hydronephrosis or ureterectasis.  2.  Poorly distended bladder.  Mild bladder wall prominence.  Mild cystitis cannot be   excluded.  3.  Moderate constipation.  Normal appendix      Lab Results   Component Value Date    IRON 74 06/13/2022    TIBC 496 (H) 06/13/2022    FERRITIN 139 06/13/2022     Lab Results   Component Value Date    WBC 6.52 06/13/2022    HGB 11.8 (L) 06/13/2022    HCT 35.7 (L) 06/13/2022     (H) 06/13/2022     06/13/2022         Assessment:       1. SOB (shortness of breath)    2. Anemia in chronic kidney disease, unspecified CKD stage    3. Chronic kidney disease, unspecified CKD stage    4. Patient is Jehovah's witness        Plan:   1. Pt progressive SOB, CP and generalized weakness  Referral to ED for further evaluation    2.,3, 4.     Pt is a Pent[employer]ism and declines/not interested in blood and blood products due to Mandaen beliefs  She is undergoing epo inj q 2wks  Hb 11.8 g/dL  Ferritin parameters wnl   Pt followed by Nephrology . It has been determined early CKD stage III, suspect due to age-related renal nephron loss, along with possible diabetic nephropathy v. hypertensive nephrosclerosis  CBC q2wks STANDING  Cont EPO  inj q 2wks( pending lab parameters)  Continue periodic  monitoring of Fe studies    Follow-up with PCP for med mgmt    F/u in 3mos with labs      Referral to ED  Advance Care Planning     Power of   I previously initiated the process of advance care planning today and explained the importance of this process to the patient.  I introduced the concept of advance directives to the patient, as well. Then the patient received detailed information about the importance of designating a Health Care Power of  (HCPOA). She was also instructed to communicate with this person about their wishes for future healthcare, should she become sick and lose decision-making capacity. The patient has  previously appointed a HCPOA. Pt completed in 2015           CC: Micaela Mendoza M.D.

## 2022-06-13 NOTE — ED TRIAGE NOTES
Patient referred to ED due to difficulty breathing. Patient reports weakness. Patient reports pain in the neck and upper back.

## 2022-06-14 VITALS
HEART RATE: 80 BPM | OXYGEN SATURATION: 96 % | TEMPERATURE: 99 F | SYSTOLIC BLOOD PRESSURE: 150 MMHG | DIASTOLIC BLOOD PRESSURE: 72 MMHG | RESPIRATION RATE: 19 BRPM | WEIGHT: 134.5 LBS | BODY MASS INDEX: 24.75 KG/M2 | HEIGHT: 62 IN

## 2022-06-14 LAB
HAPTOGLOB SERPL-MCNC: 184 MG/DL (ref 30–250)
T3 SERPL-MCNC: 70 NG/DL (ref 60–180)

## 2022-06-14 PROCEDURE — 63600175 PHARM REV CODE 636 W HCPCS: Performed by: HOSPITALIST

## 2022-06-14 PROCEDURE — 96376 TX/PRO/DX INJ SAME DRUG ADON: CPT | Performed by: EMERGENCY MEDICINE

## 2022-06-14 PROCEDURE — 25000003 PHARM REV CODE 250: Performed by: FAMILY MEDICINE

## 2022-06-14 PROCEDURE — 94761 N-INVAS EAR/PLS OXIMETRY MLT: CPT

## 2022-06-14 PROCEDURE — G0378 HOSPITAL OBSERVATION PER HR: HCPCS

## 2022-06-14 PROCEDURE — 99900035 HC TECH TIME PER 15 MIN (STAT)

## 2022-06-14 PROCEDURE — 63600175 PHARM REV CODE 636 W HCPCS: Performed by: NURSE PRACTITIONER

## 2022-06-14 PROCEDURE — 96375 TX/PRO/DX INJ NEW DRUG ADDON: CPT | Performed by: EMERGENCY MEDICINE

## 2022-06-14 PROCEDURE — 25000003 PHARM REV CODE 250: Performed by: HOSPITALIST

## 2022-06-14 RX ORDER — HYDRALAZINE HYDROCHLORIDE 25 MG/1
50 TABLET, FILM COATED ORAL EVERY 8 HOURS
Status: DISCONTINUED | OUTPATIENT
Start: 2022-06-14 | End: 2022-06-14 | Stop reason: HOSPADM

## 2022-06-14 RX ORDER — HYDRALAZINE HYDROCHLORIDE 20 MG/ML
10 INJECTION INTRAMUSCULAR; INTRAVENOUS EVERY 8 HOURS PRN
Status: DISCONTINUED | OUTPATIENT
Start: 2022-06-14 | End: 2022-06-14 | Stop reason: HOSPADM

## 2022-06-14 RX ADMIN — LEVOTHYROXINE SODIUM 50 MCG: 50 TABLET ORAL at 05:06

## 2022-06-14 RX ADMIN — HYDRALAZINE HYDROCHLORIDE 50 MG: 25 TABLET, FILM COATED ORAL at 01:06

## 2022-06-14 RX ADMIN — ATORVASTATIN CALCIUM 20 MG: 10 TABLET, FILM COATED ORAL at 08:06

## 2022-06-14 RX ADMIN — HYDRALAZINE HYDROCHLORIDE 10 MG: 20 INJECTION INTRAMUSCULAR; INTRAVENOUS at 11:06

## 2022-06-14 RX ADMIN — HYDRALAZINE HYDROCHLORIDE 10 MG: 20 INJECTION INTRAMUSCULAR; INTRAVENOUS at 01:06

## 2022-06-14 RX ADMIN — APIXABAN 5 MG: 5 TABLET, FILM COATED ORAL at 08:06

## 2022-06-14 RX ADMIN — PREDNISONE 5 MG: 5 TABLET ORAL at 08:06

## 2022-06-14 RX ADMIN — CARVEDILOL 25 MG: 12.5 TABLET, FILM COATED ORAL at 08:06

## 2022-06-14 RX ADMIN — NIFEDIPINE 60 MG: 30 TABLET, FILM COATED, EXTENDED RELEASE ORAL at 08:06

## 2022-06-14 RX ADMIN — LEVETIRACETAM 250 MG: 100 SOLUTION ORAL at 08:06

## 2022-06-14 RX ADMIN — FOLIC ACID 1000 MCG: 1 TABLET ORAL at 08:06

## 2022-06-14 NOTE — ASSESSMENT & PLAN NOTE
Last HgbA1c   Lab Results   Component Value Date    HGBA1C 5.1 04/04/2022     Hold oral antihyperglycemics while inpatient  PRN sliding scale insulin  ACHS glucose monitoring   ADA diet   Resume home med regimen at discharge

## 2022-06-14 NOTE — NURSING
Discharge instructions given to patient and daughter at bedside. Patient verbalized understanding of instructions. Patient states willingness to comply. Saline lock removed. Tele monitoring removed. Transport requested. Daughter at bedside helping patient dress.

## 2022-06-14 NOTE — SUBJECTIVE & OBJECTIVE
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, without long-term current use of insulin     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, without long-term current use of insulin     Depression     Diabetes mellitus, type 2     Eye injury as a child     k-abrasion  od    Hyperlipidemia     Hypertension     Hypothyroidism     Immune deficiency disorder     Immune disorder     LOC (loss of consciousness) 2021    at home    Myalgia and myositis 2012    Osteoporosis     Polyneuropathy     Pulmonary embolism 7/10/2021    Renal manifestation of secondary diabetes mellitus     Sarcoidosis     Seizure     Type 2 diabetes mellitus     Ulcer     no cancer    Urinary incontinence        Past Surgical History:   Procedure Laterality Date    CARPAL TUNNEL RELEASE      Rt wrist    CATARACT EXTRACTION W/  INTRAOCULAR LENS IMPLANT Right 2015    Dr. Azevedo    CATARACT EXTRACTION W/  INTRAOCULAR LENS IMPLANT Left 2015    Dr. Azevedo    CERVICAL SPINE SURGERY       SECTION      CHOLECYSTECTOMY      INJECTION OF ANESTHETIC AGENT AROUND NERVE Left 2020    Procedure: BLOCK, NERVE LEFT FEMORAL AND OBTURATOR;  Surgeon: Alfonso Richards MD;  Location: [facility], [place], operated by [facility];  Service: Pain Management;  Laterality: Left;  NEEDS CONSENT    INJECTION OF JOINT Left 3/21/2019    Procedure: Injection, Joint  fLUOROSCOPIC jOINT iNJECTION (hIP iNJECTION) LEFT ROCH BURSA AS WELL LEFT TROCHANTERIC BURSA;  Surgeon: Alfonso Richards MD;  Location: [facility], [place], operated by [facility];  Service: Pain Management;  Laterality: Left;  NEEDS CONSENT, DIABETIC    INJECTION OF JOINT Left 2019    Procedure: Injection, Joint FLUOROSCOPIC JOINT  INJECTION (HIP INJECTION) LEFT HIP;  Surgeon: Alfonso Richards MD;  Location: [facility];  Service: Pain Management;  Laterality: Left;  NEEDS CONSENT    INJECTION OF JOINT Left 9/12/2019    Procedure: INJECTION, JOINT;  Surgeon: Alfonso Richards MD;  Location: [facility];  Service: Pain Management;  Laterality: Left;  Left Hip and Left GTB Injections    INJECTION OF JOINT Left 7/27/2020    Procedure: INJECTION, JOINT, LEFT HIP and LEFT GREATER TROCHANTERIC BURSA;  Surgeon: Alfonso Richards MD;  Location: [facility];  Service: Pain Management;  Laterality: Left;  INJECTION, JOINT, LEFT HIP and LEFT GREATER TROCHANTERIC BURSA    INJECTION OF JOINT Left 9/3/2020    Procedure: INJECTION, JOINT, LEFT SI;  Surgeon: Alfonso Richards MD;  Location: [facility];  Service: Pain Management;  Laterality: Left;  INJECTION, JOINT, LEFT SI    TRANSFORAMINAL EPIDURAL INJECTION OF STEROID Bilateral 12/5/2019    Procedure: INJECTION, STEROID, EPIDURAL, TRANSFORAMINAL APPROACH;  Surgeon: Alfonso Richards MD;  Location: [facility];  Service: Pain Management;  Laterality: Bilateral;  B/L TF RHIANNA L5  Consent Needed    TRANSFORAMINAL EPIDURAL INJECTION OF STEROID Bilateral 6/29/2020    Procedure: INJECTION, STEROID, EPIDURAL, TRANSFORAMINAL APPROACH L5/S1;  Surgeon: Alfonso Richards MD;  Location: [facility];  Service: Pain Management;  Laterality: Bilateral;  B/L TF RHIANNA L5/S1    TUBAL LIGATION         Review of patient's allergies indicates:   Allergen Reactions    Azathioprine Shortness Of Breath and Other (See Comments)     Fatigue       No current facility-administered medications on file prior to encounter.     Current Outpatient Medications on File Prior to Encounter   Medication Sig    ACCU-CHEK FASTCLIX LANCING DEV Kit USE AS DIRECTED.    ALCOHOL ANTISEPTIC PADS (ALCOHOL PREP PADS TOP)     apixaban (ELIQUIS) 5 mg Tab Take 1 tablet (5 mg total) by mouth 2 (two) times daily. Start this prescription after finishing starter pack     atorvastatin (LIPITOR) 20 MG tablet Take 1 tablet by mouth once daily    blood sugar diagnostic (ACCU-CHEK SMARTVIEW TEST STRIP) Strp Use as directed to check blood sugar twice daily.    blood-glucose meter kit Use as instructed    carvediloL (COREG) 25 MG tablet Take 1 tablet (25 mg total) by mouth 2 (two) times daily.    celecoxib (CELEBREX) 100 MG capsule Take 1 capsule (100 mg total) by mouth once daily.    epoetin german-epbx (RETACRIT) 10,000 unit/mL imjection Inject 20,000 Units into the skin every 14 (fourteen) days.    fenofibrate 160 MG Tab Take 1 tablet by mouth once daily    folic acid (FOLVITE) 1 MG tablet Take 1 tablet (1,000 mcg total) by mouth once daily.    hydrALAZINE (APRESOLINE) 25 MG tablet Take 2 tabs every 8 hours by mouth. Check BP 2 hours after each dose and if Blood pressure >160- please take 1 extra tab    levETIRAcetam (KEPPRA) 250 MG Tab Take 1 tablet (250 mg total) by mouth 2 (two) times daily.    levothyroxine (SYNTHROID) 50 MCG tablet Take 1 tablet (50 mcg total) by mouth before breakfast.    metFORMIN (GLUCOPHAGE) 1000 MG tablet TAKE 1 TABLET BY MOUTH TWICE DAILY WITH MEALS    NIFEdipine (PROCARDIA-XL) 60 MG (OSM) 24 hr tablet Take 1 tablet (60 mg total) by mouth once daily.    olopatadine (PATANOL) 0.1 % ophthalmic solution Place 1 drop into both eyes daily as needed for Allergies.    potassium chloride SA (K-DUR,KLOR-CON) 20 MEQ tablet Take 1 tablet (20 mEq total) by mouth 2 (two) times daily.    predniSONE (DELTASONE) 5 MG tablet Take 1 tablet (5 mg total) by mouth once daily.    tiZANidine (ZANAFLEX) 2 MG tablet Take 2 tablets (4 mg total) by mouth every 8 (eight) hours as needed (muscle spasm).    brivaracetam (BRIVIACT) 75 mg Tab Take 1 tablet (75 mg total) by mouth 2 (two) times daily.    calcium carbonate (OS-MALCOLM) 600 mg calcium (1,500 mg) Tab Take 1 tablet by mouth 2 (two) times daily.     DULoxetine (CYMBALTA) 20 MG capsule Take 1 capsule (20 mg total) by mouth once daily.     [DISCONTINUED] gabapentin (NEURONTIN) 400 MG capsule Take 1 capsule (400 mg total) by mouth 3 (three) times daily     Family History       Problem Relation (Age of Onset)    Arthritis Sister    Cancer Sister    Cataracts Mother    Diabetes Son    Glaucoma Sister    Hepatitis Sister    Hypertension Mother, Maternal Grandmother, Son    Immunodeficiency Sister    Kidney failure Sister    No Known Problems Father, Brother, Maternal Aunt, Maternal Uncle, Paternal Aunt, Paternal Uncle, Maternal Grandfather, Paternal Grandmother, Paternal Grandfather          Tobacco Use    Smoking status: Never Smoker    Smokeless tobacco: Never Used   Substance and Sexual Activity    Alcohol use: No    Drug use: No    Sexual activity: Not Currently     Partners: Male     Review of Systems   Constitutional:  Positive for fatigue. Negative for chills and fever.   Eyes:  Negative for photophobia and visual disturbance.   Respiratory:  Positive for shortness of breath. Negative for cough.    Cardiovascular:  Negative for chest pain, palpitations and leg swelling.   Gastrointestinal:  Negative for abdominal pain, diarrhea, nausea and vomiting.   Genitourinary:  Negative for dysuria, frequency and urgency.   Musculoskeletal:  Positive for arthralgias.   Skin:  Negative for pallor, rash and wound.   Neurological:  Positive for weakness. Negative for light-headedness and headaches.   Psychiatric/Behavioral:  Negative for confusion and decreased concentration.    Objective:     Vital Signs (Most Recent):  Temp: 98.2 °F (36.8 °C) (06/13/22 2129)  Pulse: (!) 116 (06/13/22 2129)  Resp: 18 (06/13/22 2129)  BP: (!) 188/107 (06/13/22 2129)  SpO2: 97 % (06/13/22 2129)   Vital Signs (24h Range):  Temp:  [98.1 °F (36.7 °C)-98.6 °F (37 °C)] 98.2 °F (36.8 °C)  Pulse:  [107-131] 116  Resp:  [18-42] 18  SpO2:  [92 %-98 %] 97 %  BP: (132-207)/() 188/107     Weight: 61 kg (134 lb 7.7 oz)  Body mass index is 24.6 kg/m².    Physical Exam  Vitals and nursing  note reviewed.   Constitutional:       General: She is not in acute distress.     Appearance: She is well-developed.   HENT:      Head: Normocephalic and atraumatic.      Right Ear: External ear normal.      Left Ear: External ear normal.      Nose: Nose normal.   Eyes:      Extraocular Movements: EOM normal.      Conjunctiva/sclera: Conjunctivae normal.      Pupils: Pupils are equal, round, and reactive to light.   Cardiovascular:      Rate and Rhythm: Normal rate and regular rhythm.   Pulmonary:      Effort: Pulmonary effort is normal. No respiratory distress.      Breath sounds: Normal breath sounds. No wheezing.   Abdominal:      General: Bowel sounds are normal. There is no distension.      Palpations: Abdomen is soft.      Tenderness: There is no abdominal tenderness.      Comments: No palpable hepatomegaly or splenomegaly    Musculoskeletal:         General: No tenderness. Normal range of motion.      Cervical back: Normal range of motion and neck supple.   Skin:     General: Skin is warm and dry.   Neurological:      Mental Status: She is alert and oriented to person, place, and time.   Psychiatric:         Thought Content: Thought content normal.         CRANIAL NERVES     CN III, IV, VI   Pupils are equal, round, and reactive to light.  Extraocular motions are normal.      Significant Labs: All pertinent labs within the past 24 hours have been reviewed.    Significant Imaging: I have reviewed all pertinent imaging results/findings within the past 24 hours.

## 2022-06-14 NOTE — H&P
[facility] Medicine  History & Physical    Patient Name: Regino Lawrence  MRN: [id number]  Patient Class: OP- Observation  Admission Date: 6/13/2022  Attending Physician: Ritchie Chaparro MD   Primary Care Provider: Micaela Mendoza MD         Patient information was obtained from patient, past medical records and ER records.     Subjective:     Principal Problem:Shortness of breath    Chief Complaint:   Chief Complaint   Patient presents with    Shortness of Breath     Pt sent down from hematology for c/o increased SOB, CP, weakness and shaking. Pt sent down for evaluation on oxygen          HPI: 76 y.o. female with hypothyroidism, HLD, HTN, DM2, Sarcoidosis, debility, anemia of ckd, chronic pain and fatigue, anxiety, and seizure disorder presents from Hem/Onc clinic for evaluation of shortness of breath. Acute onset, duration 1 week, associated with fatigue and lower extremity edema, exacerbated by exertion.  Denies fever, chills, cough, chest pain, palpitations, orthopnea, PND, syncope, n/v/d, abdominal pain, or dysuria.  In the ED, routine labs, d-dimer, BNP, troponin, CXR and CTA chest all unremarkable for acute abnormality.  Reportedly 92% sats on room air and observation for hypoxia requested.      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, without long-term current use of insulin     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, without long-term current use of insulin     Depression     Diabetes mellitus, type 2     Eye injury as a child     k-abrasion  od    Hyperlipidemia     Hypertension     Hypothyroidism     Immune deficiency disorder      Immune disorder     LOC (loss of consciousness) 2021    at home    Myalgia and myositis 2012    Osteoporosis     Polyneuropathy     Pulmonary embolism 7/10/2021    Renal manifestation of secondary diabetes mellitus     Sarcoidosis     Seizure     Type 2 diabetes mellitus     Ulcer     no cancer    Urinary incontinence        Past Surgical History:   Procedure Laterality Date    CARPAL TUNNEL RELEASE      Rt wrist    CATARACT EXTRACTION W/  INTRAOCULAR LENS IMPLANT Right 2015    Dr. Azevedo    CATARACT EXTRACTION W/  INTRAOCULAR LENS IMPLANT Left 2015    Dr. Azevedo    CERVICAL SPINE SURGERY       SECTION      CHOLECYSTECTOMY      INJECTION OF ANESTHETIC AGENT AROUND NERVE Left 2020    Procedure: BLOCK, NERVE LEFT FEMORAL AND OBTURATOR;  Surgeon: Alfonso Richards MD;  Location: [facility];  Service: Pain Management;  Laterality: Left;  NEEDS CONSENT    INJECTION OF JOINT Left 3/21/2019    Procedure: Injection, Joint  fLUOROSCOPIC jOINT iNJECTION (hIP iNJECTION) LEFT ROCH BURSA AS WELL LEFT TROCHANTERIC BURSA;  Surgeon: Alfonso Richards MD;  Location: [facility];  Service: Pain Management;  Laterality: Left;  NEEDS CONSENT, DIABETIC    INJECTION OF JOINT Left 2019    Procedure: Injection, Joint FLUOROSCOPIC JOINT INJECTION (HIP INJECTION) LEFT HIP;  Surgeon: Alfonso Richards MD;  Location: [facility];  Service: Pain Management;  Laterality: Left;  NEEDS CONSENT    INJECTION OF JOINT Left 2019    Procedure: INJECTION, JOINT;  Surgeon: Alfonso Richards MD;  Location: [facility];  Service: Pain Management;  Laterality: Left;  Left Hip and Left GTB Injections    INJECTION OF JOINT Left 2020    Procedure: INJECTION, JOINT, LEFT HIP and LEFT GREATER TROCHANTERIC BURSA;  Surgeon: Alfonso Richards MD;  Location: [facility];  Service: Pain Management;  Laterality: Left;  INJECTION, JOINT, LEFT HIP and LEFT GREATER TROCHANTERIC BURSA    INJECTION OF JOINT  Left 9/3/2020    Procedure: INJECTION, JOINT, LEFT SI;  Surgeon: Alfonso Richards MD;  Location: [facility];  Service: Pain Management;  Laterality: Left;  INJECTION, JOINT, LEFT SI    TRANSFORAMINAL EPIDURAL INJECTION OF STEROID Bilateral 12/5/2019    Procedure: INJECTION, STEROID, EPIDURAL, TRANSFORAMINAL APPROACH;  Surgeon: Alfonso Richards MD;  Location: [facility];  Service: Pain Management;  Laterality: Bilateral;  B/L TF RHIANNA L5  Consent Needed    TRANSFORAMINAL EPIDURAL INJECTION OF STEROID Bilateral 6/29/2020    Procedure: INJECTION, STEROID, EPIDURAL, TRANSFORAMINAL APPROACH L5/S1;  Surgeon: Alfonso Richards MD;  Location: [facility];  Service: Pain Management;  Laterality: Bilateral;  B/L TF RHIANNA L5/S1    TUBAL LIGATION         Review of patient's allergies indicates:   Allergen Reactions    Azathioprine Shortness Of Breath and Other (See Comments)     Fatigue       No current facility-administered medications on file prior to encounter.     Current Outpatient Medications on File Prior to Encounter   Medication Sig    ACCU-CHEK FASTCLIX LANCING DEV Kit USE AS DIRECTED.    ALCOHOL ANTISEPTIC PADS (ALCOHOL PREP PADS TOP)     apixaban (ELIQUIS) 5 mg Tab Take 1 tablet (5 mg total) by mouth 2 (two) times daily. Start this prescription after finishing starter pack    atorvastatin (LIPITOR) 20 MG tablet Take 1 tablet by mouth once daily    blood sugar diagnostic (ACCU-CHEK SMARTVIEW TEST STRIP) Strp Use as directed to check blood sugar twice daily.    blood-glucose meter kit Use as instructed    carvediloL (COREG) 25 MG tablet Take 1 tablet (25 mg total) by mouth 2 (two) times daily.    celecoxib (CELEBREX) 100 MG capsule Take 1 capsule (100 mg total) by mouth once daily.    epoetin german-epbx (RETACRIT) 10,000 unit/mL imjection Inject 20,000 Units into the skin every 14 (fourteen) days.    fenofibrate 160 MG Tab Take 1 tablet by mouth once daily    folic acid (FOLVITE) 1 MG tablet Take 1 tablet (1,000  mcg total) by mouth once daily.    hydrALAZINE (APRESOLINE) 25 MG tablet Take 2 tabs every 8 hours by mouth. Check BP 2 hours after each dose and if Blood pressure >160- please take 1 extra tab    levETIRAcetam (KEPPRA) 250 MG Tab Take 1 tablet (250 mg total) by mouth 2 (two) times daily.    levothyroxine (SYNTHROID) 50 MCG tablet Take 1 tablet (50 mcg total) by mouth before breakfast.    metFORMIN (GLUCOPHAGE) 1000 MG tablet TAKE 1 TABLET BY MOUTH TWICE DAILY WITH MEALS    NIFEdipine (PROCARDIA-XL) 60 MG (OSM) 24 hr tablet Take 1 tablet (60 mg total) by mouth once daily.    olopatadine (PATANOL) 0.1 % ophthalmic solution Place 1 drop into both eyes daily as needed for Allergies.    potassium chloride SA (K-DUR,KLOR-CON) 20 MEQ tablet Take 1 tablet (20 mEq total) by mouth 2 (two) times daily.    predniSONE (DELTASONE) 5 MG tablet Take 1 tablet (5 mg total) by mouth once daily.    tiZANidine (ZANAFLEX) 2 MG tablet Take 2 tablets (4 mg total) by mouth every 8 (eight) hours as needed (muscle spasm).    brivaracetam (BRIVIACT) 75 mg Tab Take 1 tablet (75 mg total) by mouth 2 (two) times daily.    calcium carbonate (OS-MALCOLM) 600 mg calcium (1,500 mg) Tab Take 1 tablet by mouth 2 (two) times daily.     DULoxetine (CYMBALTA) 20 MG capsule Take 1 capsule (20 mg total) by mouth once daily.    [DISCONTINUED] gabapentin (NEURONTIN) 400 MG capsule Take 1 capsule (400 mg total) by mouth 3 (three) times daily     Family History       Problem Relation (Age of Onset)    Arthritis Sister    Cancer Sister    Cataracts Mother    Diabetes Son    Glaucoma Sister    Hepatitis Sister    Hypertension Mother, Maternal Grandmother, Son    Immunodeficiency Sister    Kidney failure Sister    No Known Problems Father, Brother, Maternal Aunt, Maternal Uncle, Paternal Aunt, Paternal Uncle, Maternal Grandfather, Paternal Grandmother, Paternal Grandfather          Tobacco Use    Smoking status: Never Smoker    Smokeless tobacco: Never  Used   Substance and Sexual Activity    Alcohol use: No    Drug use: No    Sexual activity: Not Currently     Partners: Male     Review of Systems   Constitutional:  Positive for fatigue. Negative for chills and fever.   Eyes:  Negative for photophobia and visual disturbance.   Respiratory:  Positive for shortness of breath. Negative for cough.    Cardiovascular:  Negative for chest pain, palpitations and leg swelling.   Gastrointestinal:  Negative for abdominal pain, diarrhea, nausea and vomiting.   Genitourinary:  Negative for dysuria, frequency and urgency.   Musculoskeletal:  Positive for arthralgias.   Skin:  Negative for pallor, rash and wound.   Neurological:  Positive for weakness. Negative for light-headedness and headaches.   Psychiatric/Behavioral:  Negative for confusion and decreased concentration.    Objective:     Vital Signs (Most Recent):  Temp: 98.2 °F (36.8 °C) (06/13/22 2129)  Pulse: (!) 116 (06/13/22 2129)  Resp: 18 (06/13/22 2129)  BP: (!) 188/107 (06/13/22 2129)  SpO2: 97 % (06/13/22 2129)   Vital Signs (24h Range):  Temp:  [98.1 °F (36.7 °C)-98.6 °F (37 °C)] 98.2 °F (36.8 °C)  Pulse:  [107-131] 116  Resp:  [18-42] 18  SpO2:  [92 %-98 %] 97 %  BP: (132-207)/() 188/107     Weight: 61 kg (134 lb 7.7 oz)  Body mass index is 24.6 kg/m².    Physical Exam  Vitals and nursing note reviewed.   Constitutional:       General: She is not in acute distress.     Appearance: She is well-developed.   HENT:      Head: Normocephalic and atraumatic.      Right Ear: External ear normal.      Left Ear: External ear normal.      Nose: Nose normal.   Eyes:      Extraocular Movements: EOM normal.      Conjunctiva/sclera: Conjunctivae normal.      Pupils: Pupils are equal, round, and reactive to light.   Cardiovascular:      Rate and Rhythm: Normal rate and regular rhythm.   Pulmonary:      Effort: Pulmonary effort is normal. No respiratory distress.      Breath sounds: Normal breath sounds. No wheezing.    Abdominal:      General: Bowel sounds are normal. There is no distension.      Palpations: Abdomen is soft.      Tenderness: There is no abdominal tenderness.      Comments: No palpable hepatomegaly or splenomegaly    Musculoskeletal:         General: No tenderness. Normal range of motion.      Cervical back: Normal range of motion and neck supple.   Skin:     General: Skin is warm and dry.   Neurological:      Mental Status: She is alert and oriented to person, place, and time.   Psychiatric:         Thought Content: Thought content normal.         CRANIAL NERVES     CN III, IV, VI   Pupils are equal, round, and reactive to light.  Extraocular motions are normal.      Significant Labs: All pertinent labs within the past 24 hours have been reviewed.    Significant Imaging: I have reviewed all pertinent imaging results/findings within the past 24 hours.    Assessment/Plan:     * Shortness of breath  Extensive workup fails to reveal acute abnormality, reportedly low sats on room air 92%, suspect instrument error as patient has thick acrylic fingernails.    Chronic pain  Continue home analgesics     Anemia in CKD (chronic kidney disease)  Patient H/H stable and consistent with baseline. No evidence acute bleeding or indication for transfusion at this time.      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     Combined 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  Continue  statin    Hypothyroidism  Continue home regimen of synthroid      VTE Risk Mitigation (From admission, onward)         Ordered     apixaban tablet 5 mg  2 times daily         06/13/22 1821     IP VTE HIGH RISK PATIENT  Once         06/13/22 1821     Place sequential compression device  Until discontinued         06/13/22 1821     Reason for No Pharmacological VTE Prophylaxis  Once        Question:  Reasons:  Answer:  Already adequately anticoagulated on oral Anticoagulants    06/13/22 1821               As clarification, on 06/13/2022, patient was placed in hospital observation services under my care in collaboration with MD Godfrey Phillips Jr., APRN, [facility]  Hospitalist - Department of Hospital Medicine  [facility] [address] [place] [address]  Office #: [phone]; Pager #: [phone]

## 2022-06-14 NOTE — HOSPITAL COURSE
Patient was admitted overnight secondary to concern for hypoxia, placed on 1 L nasal cannula and was quickly transitioned to room air.  This morning she is sitting upright in bed, reports feeling much better, denies any shortness of breath currently and is eager to go home.  On labs and imaging reviewed, patient is to follow-up with her primary care physician.  She expresses understanding and agreement with plan.

## 2022-06-14 NOTE — ASSESSMENT & PLAN NOTE
Last HgbA1c   Lab Results   Component Value Date    HGBA1C 5.1 04/04/2022     Hold oral antihyperglycemics while inpatient  PRN sliding scale insulin  ACHS glucose monitoring   ADA diet

## 2022-06-14 NOTE — PLAN OF CARE
06/14/22 1222   Final Note   Assessment Type Final Discharge Note   Anticipated Discharge Disposition Home   Hospital Resources/Appts/Education Provided Appointments scheduled and added to AVS   Post-Acute Status   Post-Acute Authorization Other   Other Status No Post-Acute Service Needs   Pts nurse Elisa notified that the pt can d/c from CM standpoint

## 2022-06-14 NOTE — DISCHARGE SUMMARY
[facility] Medicine  Discharge Summary      Patient Name: Regino Lawrence  MRN: [id number]  Patient Class: OP- Observation  Admission Date: 6/13/2022  Hospital Length of Stay: 0 days  Discharge Date and Time:  06/14/2022 12:15 PM  Attending Physician: Marcin Lackey MD   Discharging Provider: Marcin Lackey MD  Primary Care Provider: Micaela Mendoza MD      HPI:   76 y.o. female with hypothyroidism, HLD, HTN, DM2, Sarcoidosis, debility, anemia of ckd, chronic pain and fatigue, anxiety, and seizure disorder presents from Hem/Onc clinic for evaluation of shortness of breath. Acute onset, duration 1 week, associated with fatigue and lower extremity edema, exacerbated by exertion.  Denies fever, chills, cough, chest pain, palpitations, orthopnea, PND, syncope, n/v/d, abdominal pain, or dysuria.  In the ED, routine labs, d-dimer, BNP, troponin, CXR and CTA chest all unremarkable for acute abnormality.  Reportedly 92% sats on room air and observation for hypoxia requested.      * No surgery found *      Hospital Course:   Patient was admitted overnight secondary to concern for hypoxia, placed on 1 L nasal cannula and was quickly transitioned to room air.  This morning she is sitting upright in bed, reports feeling much better, denies any shortness of breath currently and is eager to go home.  On labs and imaging reviewed, patient is to follow-up with her primary care physician.  She expresses understanding and agreement with plan.       Goals of Care Treatment Preferences:  Code Status: Full Code    Living Will: Yes              Consults:   Consults (From admission, onward)        Status Ordering Provider     Inpatient consult to Social Work/Case Management  Once        Provider:  (Not yet assigned)    Acknowledged THEE BRAXTON     IP consult to case management  Once        Provider:  (Not yet assigned)    Acknowledged THEE BRAXTON          * Shortness of breath  Extensive workup  previously fails to reveal acute abnormality, reportedly low sats on room air 92%, question instrument error  CTA chest did not show any pulmonary embolus or additional acute abnormal findings.      Essential hypertension  Well controlled, continue home medications and monitor blood pressure, adjust as needed.   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, without long-term current use of insulin [E11.3292] 10/27/2014 Yes     Chronic    Combined 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 [E11.69, E78.2] 07/25/2013 Yes     Chronic    Essential hypertension [I10] 07/25/2013 Yes     Chronic    Hypothyroidism [E03.9] 01/07/2013 Yes     Chronic      Problems Resolved During this Admission:       Discharged Condition: stable    Disposition: Home or Self Care    Follow Up:    Patient Instructions:      Ambulatory referral/consult to Outpatient Case Management   Referral Priority: Routine Referral Type: Consultation   Referral Reason: Specialty Services Required   Number of Visits Requested: 1     Follow-up primary physician   Standing Status: Future Standing Exp. Date: 06/14/23       Significant Diagnostic Studies: Labs: All labs within the past 24 hours have been reviewed    Pending Diagnostic Studies:     Procedure Component Value Units Date/Time    Haptoglobin [[id number]] Collected: 06/13/22 1524    Order Status: Sent Lab Status: In process Updated: 06/13/22 1524    Specimen: Blood     Lactic acid,  plasma [[id number]]     Order Status: Sent Lab Status: No result     Specimen: Blood     T3 [[id number]] Collected: 06/13/22 1524    Order Status: Sent Lab Status: In process Updated: 06/13/22 1524    Specimen: Blood          Medications:  Reconciled Home Medications:      Medication List      CONTINUE taking these medications    ACCU-CHEK FASTCLIX LANCING DEV Kit  Generic drug: lancing device with lancets  USE AS DIRECTED.     ALCOHOL PREP PADS TOP     atorvastatin 20 MG tablet  Commonly known as: LIPITOR  Take 1 tablet by mouth once daily     blood sugar diagnostic Strp  Commonly known as: ACCU-CHEK SMARTVIEW TEST STRIP  Use as directed to check blood sugar twice daily.     blood-glucose meter kit  Use as instructed     brivaracetam 75 mg Tab  Commonly known as: BRIVIACT  Take 1 tablet (75 mg total) by mouth 2 (two) times daily.     calcium carbonate 600 mg calcium (1,500 mg) Tab  Commonly known as: OS-MALCOLM  Take 1 tablet by mouth 2 (two) times daily.     carvediloL 25 MG tablet  Commonly known as: COREG  Take 1 tablet (25 mg total) by mouth 2 (two) times daily.     celecoxib 100 MG capsule  Commonly known as: CeleBREX  Take 1 capsule (100 mg total) by mouth once daily.     DULoxetine 20 MG capsule  Commonly known as: CYMBALTA  Take 1 capsule (20 mg total) by mouth once daily.     ELIQUIS 5 mg Tab  Generic drug: apixaban  Take 1 tablet (5 mg total) by mouth 2 (two) times daily. Start this prescription after finishing starter pack     epoetin german-epbx 10,000 unit/mL imjection  Commonly known as: RETACRIT  Inject 20,000 Units into the skin every 14 (fourteen) days.     fenofibrate 160 MG Tab  Take 1 tablet by mouth once daily     folic acid 1 MG tablet  Commonly known as: FOLVITE  Take 1 tablet (1,000 mcg total) by mouth once daily.     hydrALAZINE 25 MG tablet  Commonly known as: APRESOLINE  Take 2 tabs every 8 hours by mouth. Check BP 2 hours after each dose and if Blood pressure >160- please take 1 extra tab      levETIRAcetam 250 MG Tab  Commonly known as: KEPPRA  Take 1 tablet (250 mg total) by mouth 2 (two) times daily.     levothyroxine 50 MCG tablet  Commonly known as: SYNTHROID  Take 1 tablet (50 mcg total) by mouth before breakfast.     metFORMIN 1000 MG tablet  Commonly known as: GLUCOPHAGE  TAKE 1 TABLET BY MOUTH TWICE DAILY WITH MEALS     NIFEdipine 60 MG (OSM) 24 hr tablet  Commonly known as: PROCARDIA-XL  Take 1 tablet (60 mg total) by mouth once daily.     olopatadine 0.1 % ophthalmic solution  Commonly known as: PATANOL  Place 1 drop into both eyes daily as needed for Allergies.     potassium chloride SA 20 MEQ tablet  Commonly known as: K-DUR,KLOR-CON  Take 1 tablet (20 mEq total) by mouth 2 (two) times daily.     predniSONE 5 MG tablet  Commonly known as: DELTASONE  Take 1 tablet (5 mg total) by mouth once daily.     tiZANidine 2 MG tablet  Commonly known as: ZANAFLEX  Take 2 tablets (4 mg total) by mouth every 8 (eight) hours as needed (muscle spasm).        STOP taking these medications    gabapentin 400 MG capsule  Commonly known as: NEURONTIN            Indwelling Lines/Drains at time of discharge:   Lines/Drains/Airways     None                 Time spent on the discharge of patient: 30 minutes         Marcin Lackey MD  Department of Hospital Medicine  [facility]

## 2022-06-14 NOTE — DISCHARGE SUMMARY
[facility] Medicine  Discharge Summary      Patient Name: Regino Lawrence  MRN: [id number]  Patient Class: OP- Observation  Admission Date: 6/13/2022  Hospital Length of Stay: 0 days  Discharge Date and Time:  06/14/2022 12:17 PM  Attending Physician: Marcin Lackey MD   Discharging Provider: Marcin Lackey MD  Primary Care Provider: Micaela Mendoza MD      HPI:   76 y.o. female with hypothyroidism, HLD, HTN, DM2, Sarcoidosis, debility, anemia of ckd, chronic pain and fatigue, anxiety, and seizure disorder presents from Hem/Onc clinic for evaluation of shortness of breath. Acute onset, duration 1 week, associated with fatigue and lower extremity edema, exacerbated by exertion.  Denies fever, chills, cough, chest pain, palpitations, orthopnea, PND, syncope, n/v/d, abdominal pain, or dysuria.  In the ED, routine labs, d-dimer, BNP, troponin, CXR and CTA chest all unremarkable for acute abnormality.  Reportedly 92% sats on room air and observation for hypoxia requested.      * No surgery found *      Hospital Course:   Patient was admitted overnight secondary to concern for hypoxia, placed on 1 L nasal cannula and was quickly transitioned to room air.  This morning she is sitting upright in bed, reports feeling much better, denies any shortness of breath currently and is eager to go home.  On labs and imaging reviewed, patient is to follow-up with her primary care physician.  She expresses understanding and agreement with plan.       Goals of Care Treatment Preferences:  Code Status: Full Code    Living Will: Yes              Consults:   Consults (From admission, onward)        Status Ordering Provider     Inpatient consult to Social Work/Case Management  Once        Provider:  (Not yet assigned)    Acknowledged THEE BRAXTON     IP consult to case management  Once        Provider:  (Not yet assigned)    Acknowledged THEE BRAXTON          * Shortness of breath  Extensive workup  previously fails to reveal acute abnormality, reportedly low sats on room air 92%, question instrument error  CTA chest did not show any pulmonary embolus or additional acute abnormal findings.      Chronic pain  Continue home analgesics     Anemia in CKD (chronic kidney disease)  Patient H/H stable and consistent with baseline. No evidence acute bleeding or indication for transfusion at this time.      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, without long-term current use of insulin [E11.3292] 10/27/2014 Yes     Chronic    Combined 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 [E11.69, E78.2] 07/25/2013 Yes     Chronic    Essential hypertension [I10] 07/25/2013 Yes     Chronic     Hypothyroidism [E03.9] 01/07/2013 Yes     Chronic      Problems Resolved During this Admission:       Discharged Condition: stable    Disposition: Home or Self Care    Follow Up:    Patient Instructions:      Ambulatory referral/consult to Outpatient Case Management   Referral Priority: Routine Referral Type: Consultation   Referral Reason: Specialty Services Required   Number of Visits Requested: 1     Follow-up primary physician   Standing Status: Future Standing Exp. Date: 06/14/23       Significant Diagnostic Studies: Labs: All labs within the past 24 hours have been reviewed    Pending Diagnostic Studies:     Procedure Component Value Units Date/Time    Haptoglobin [[id number]] Collected: 06/13/22 1524    Order Status: Sent Lab Status: In process Updated: 06/13/22 1524    Specimen: Blood     Lactic acid, plasma [[id number]]     Order Status: Sent Lab Status: No result     Specimen: Blood     T3 [[id number]] Collected: 06/13/22 1524    Order Status: Sent Lab Status: In process Updated: 06/13/22 1524    Specimen: Blood          Medications:  Reconciled Home Medications:      Medication List      CONTINUE taking these medications    ACCU-CHEK FASTCLIX LANCING DEV Kit  Generic drug: lancing device with lancets  USE AS DIRECTED.     ALCOHOL PREP PADS TOP     atorvastatin 20 MG tablet  Commonly known as: LIPITOR  Take 1 tablet by mouth once daily     blood sugar diagnostic Strp  Commonly known as: ACCU-CHEK SMARTVIEW TEST STRIP  Use as directed to check blood sugar twice daily.     blood-glucose meter kit  Use as instructed     brivaracetam 75 mg Tab  Commonly known as: BRIVIACT  Take 1 tablet (75 mg total) by mouth 2 (two) times daily.     calcium carbonate 600 mg calcium (1,500 mg) Tab  Commonly known as: OS-MALCOLM  Take 1 tablet by mouth 2 (two) times daily.     carvediloL 25 MG tablet  Commonly known as: COREG  Take 1 tablet (25 mg total) by mouth 2 (two) times daily.     celecoxib 100 MG capsule  Commonly known  as: CeleBREX  Take 1 capsule (100 mg total) by mouth once daily.     DULoxetine 20 MG capsule  Commonly known as: CYMBALTA  Take 1 capsule (20 mg total) by mouth once daily.     ELIQUIS 5 mg Tab  Generic drug: apixaban  Take 1 tablet (5 mg total) by mouth 2 (two) times daily. Start this prescription after finishing starter pack     epoetin german-epbx 10,000 unit/mL imjection  Commonly known as: RETACRIT  Inject 20,000 Units into the skin every 14 (fourteen) days.     fenofibrate 160 MG Tab  Take 1 tablet by mouth once daily     folic acid 1 MG tablet  Commonly known as: FOLVITE  Take 1 tablet (1,000 mcg total) by mouth once daily.     hydrALAZINE 25 MG tablet  Commonly known as: APRESOLINE  Take 2 tabs every 8 hours by mouth. Check BP 2 hours after each dose and if Blood pressure >160- please take 1 extra tab     levETIRAcetam 250 MG Tab  Commonly known as: KEPPRA  Take 1 tablet (250 mg total) by mouth 2 (two) times daily.     levothyroxine 50 MCG tablet  Commonly known as: SYNTHROID  Take 1 tablet (50 mcg total) by mouth before breakfast.     metFORMIN 1000 MG tablet  Commonly known as: GLUCOPHAGE  TAKE 1 TABLET BY MOUTH TWICE DAILY WITH MEALS     NIFEdipine 60 MG (OSM) 24 hr tablet  Commonly known as: PROCARDIA-XL  Take 1 tablet (60 mg total) by mouth once daily.     olopatadine 0.1 % ophthalmic solution  Commonly known as: PATANOL  Place 1 drop into both eyes daily as needed for Allergies.     potassium chloride SA 20 MEQ tablet  Commonly known as: K-DUR,KLOR-CON  Take 1 tablet (20 mEq total) by mouth 2 (two) times daily.     predniSONE 5 MG tablet  Commonly known as: DELTASONE  Take 1 tablet (5 mg total) by mouth once daily.     tiZANidine 2 MG tablet  Commonly known as: ZANAFLEX  Take 2 tablets (4 mg total) by mouth every 8 (eight) hours as needed (muscle spasm).        STOP taking these medications    gabapentin 400 MG capsule  Commonly known as: NEURONTIN            Indwelling Lines/Drains at time of  discharge:   Lines/Drains/Airways     None                 Time spent on the discharge of patient: 30 minutes         Marcin Lackey MD  Department of Hospital Medicine  [facility] - Telemetry

## 2022-06-14 NOTE — PLAN OF CARE
06/14/22 0900   Discharge Planning   Assessment Type Discharge Planning Brief Assessment   Resource/Environmental Concerns none   Support Systems Spouse/significant other;Children   Equipment Currently Used at Home bedside commode;shower chair;walker, rolling;cane, straight;power chair;grab bar   Current Living Arrangements home/apartment/condo   Patient/Family Anticipates Transition to home   Patient/Family Anticipated Services at Transition none   DME Needed Upon Discharge  none   Discharge Plan A Home with family  (with follow up)     [facility] - [place] - [address]  [place] [address]  Phone: [phone] Fax: [phone]    [facility] ([facility]) - [place], [place] - [address]  [address]  [facility] [place] [address]  Phone: [phone] Fax: [phone]

## 2022-06-14 NOTE — ASSESSMENT & PLAN NOTE
Extensive workup fails to reveal acute abnormality, reportedly low sats on room air 92%, suspect instrument error as patient has thick acrylic fingernails.

## 2022-06-14 NOTE — HPI
76 y.o. female with hypothyroidism, HLD, HTN, DM2, Sarcoidosis, debility, anemia of ckd, chronic pain and fatigue, anxiety, and seizure disorder presents from Hem/Onc clinic for evaluation of shortness of breath. Acute onset, duration 1 week, associated with fatigue and lower extremity edema, exacerbated by exertion.  Denies fever, chills, cough, chest pain, palpitations, orthopnea, PND, syncope, n/v/d, abdominal pain, or dysuria.  In the ED, routine labs, d-dimer, BNP, troponin, CXR and CTA chest all unremarkable for acute abnormality.  Reportedly 92% sats on room air and observation for hypoxia requested.

## 2022-06-14 NOTE — PLAN OF CARE
2130: pt arrived to floor on stretcher. Daughter at bedside. Pt in no apparent distress. No reports of sob on 3L O2. Cardiac monitor placed. Fall precautions in place.     0130: informed Hetal Gonzalez NP about patients high bp and that patients home hydralazine hasn't been started yet. PRN IV hydralazine  ordered.     0700: report given to day shift Rn. Pt on RA with sats 94-95%. No complaints of SOB.   Problem: Adult Inpatient Plan of Care  Goal: Plan of Care Review  Outcome: Ongoing, Progressing     Problem: Adult Inpatient Plan of Care  Goal: Absence of Hospital-Acquired Illness or Injury  Outcome: Ongoing, Progressing     Problem: Diabetes Comorbidity  Goal: Blood Glucose Level Within Targeted Range  Outcome: Ongoing, Progressing     Problem: Skin Injury Risk Increased  Goal: Skin Health and Integrity  Outcome: Ongoing, Progressing     Problem: Fall Injury Risk  Goal: Absence of Fall and Fall-Related Injury  Outcome: Ongoing, Progressing

## 2022-06-14 NOTE — ASSESSMENT & PLAN NOTE
Extensive workup previously fails to reveal acute abnormality, reportedly low sats on room air 92%, question instrument error  CTA chest did not show any pulmonary embolus or additional acute abnormal findings.

## 2022-06-14 NOTE — PLAN OF CARE
Problem: Adult Inpatient Plan of Care  Goal: Plan of Care Review  Outcome: Met  Goal: Patient-Specific Goal (Individualized)  Outcome: Met  Goal: Absence of Hospital-Acquired Illness or Injury  Outcome: Met  Goal: Optimal Comfort and Wellbeing  Outcome: Met  Goal: Readiness for Transition of Care  Outcome: Met     Problem: Infection  Goal: Absence of Infection Signs and Symptoms  Outcome: Met     Problem: Diabetes Comorbidity  Goal: Blood Glucose Level Within Targeted Range  Outcome: Met     Problem: Skin Injury Risk Increased  Goal: Skin Health and Integrity  Outcome: Met     Problem: Fall Injury Risk  Goal: Absence of Fall and Fall-Related Injury  Outcome: Met

## 2022-06-14 NOTE — ASSESSMENT & PLAN NOTE
Well controlled, continue home medications and monitor blood pressure, adjust as needed.   Initially elevated BP however home meds were not started appropriately,

## 2022-06-16 ENCOUNTER — PATIENT MESSAGE (OUTPATIENT)
Dept: RHEUMATOLOGY | Facility: CLINIC | Age: 77
End: 2022-06-16
Payer: MEDICARE

## 2022-06-16 DIAGNOSIS — G72.9 MYOPATHY: ICD-10-CM

## 2022-06-16 DIAGNOSIS — D86.9 SARCOIDOSIS: ICD-10-CM

## 2022-06-17 ENCOUNTER — PATIENT MESSAGE (OUTPATIENT)
Dept: FAMILY MEDICINE | Facility: CLINIC | Age: 77
End: 2022-06-17
Payer: MEDICARE

## 2022-06-17 ENCOUNTER — OUTPATIENT CASE MANAGEMENT (OUTPATIENT)
Dept: ADMINISTRATIVE | Facility: OTHER | Age: 77
End: 2022-06-17
Payer: MEDICARE

## 2022-06-17 DIAGNOSIS — D86.9 SARCOIDOSIS: Primary | ICD-10-CM

## 2022-06-17 LAB
BACTERIA BLD CULT: NORMAL
BACTERIA BLD CULT: NORMAL

## 2022-06-17 NOTE — LETTER
June 20, 2022    Regino CODY Melinda  [address] [address]             [facility] [address] [place] [address] Dear MsElie Regino Lawrence:    Pulmonary Clinic- TEL:  [phone]  Podiatry Clinic-TEL:  [phone]     Welcome to [facility] Complex Care Management Program.  It was a pleasure talking with you today.  My name is Mirna Leos. I look forward to working with you as your .  My goal is to help you function at the healthiest and highest level possible.  You can contact me directly at [phone]    As an [facility] patient with [employer], some of the services we may be able to provide include:      Development of an individualized care plan with a Registered Nurse    Connection with a    Connection with available resources and services     Coordinate communication among your care team members    Provide coaching and education    Help you understand your doctor's treatment plan   Help you obtain information about your insurance coverage.     All services provided by [facility] Complex Care Managers and other care team members are coordinated with and communicated to your primary care team.    As part of your enrollment, you will be receiving education materials and more information about these services in your [facility] account, by phone or through the mail.  If you do not wish to participate or receive information, please contact our office at [phone].      Sincerely,        Mirna Leos RN  [facility]   Out-patient RN Complex Care Manager                                                                -2-  [facility]:  Mirna Leos RN, [facility]- [facility] Care Management Nurse   Tel:[phone] Mon-Fri, 8 am- 4:30 pm    [facility] On Call, 24/7 Nurse Help Line (non emergent)- TEL:  [phone]    [facility] Technical Issue Support Team--TEL:  [phone], Mon-Fri 9 am- 5 pm.    [email or web address] online patient  "portal    [facility] Financial Assistance TEL:  [phone]        [employer] Managed Care:    [employer] First 24 Hour Nurse Line--TEL:  [phone]    [employer],  Over-the-Counter Benefit-- TEL:  [phone]  On line:  [employer], select "Over-the-Counter (OTC) items from the "Shop OTC & Supplies drop down at the top of the page    [employer] Man[employer], Karla [employer]- Arsen [facility]  On line [employer] and Health Education Resources        [email or web address]    [employer] Man[employer], "Mom's Meal", TEL: [phone]    [employer] Managed [employer] - Transportation [facility]-- TEL:  [phone]  Mon-Fri, 8 am-5 pm, 3 business days notice required.                                                          -3-          =       "

## 2022-06-17 NOTE — LETTER
June 20, 2022    Regino CODY Melinda  [address] [facility] [address]             [facility] [address] [place] [address] Dear MsElie Regino Larwence:    Pulmonary Clinic- TEL:  [phone]  Podiatry Clinic-TEL:  [phone]     Welcome to [facility] Complex [facility] Management [facility].  It was a pleasure talking with you today.  My name is Mirna Leos. I look forward to working with you as your .  My goal is to help you function at the healthiest and highest level possible.  You can contact me directly at [phone]    As an [facility] patient with [employer], some of the services we may be able to provide include:      Development of an individualized care plan with a Registered Nurse    Connection with a    Connection with available resources and services     Coordinate communication among your care team members    Provide coaching and education    Help you understand your doctor's treatment plan   Help you obtain information about your insurance coverage.     All services provided by [facility] Man[facility] and other care team members are coordinated with and communicated to your primary care team.    As part of your enrollment, you will be receiving education materials and more information about these services in your [facility] account, by phone or through the mail.  If you do not wish to participate or receive information, please contact our office at [phone].      Sincerely,        Mirna Leos RN  [facility]   Out-patient RN Complex Care Manager                                                                -2-  [facility]:  Mirna Leos RN, [facility] Nurse   Tel:[phone] Mon-Fri, 8 am- 4:30 pm    [facility] On Call, 24/7 Nurse Help Line (non emergent)- TEL:  [phone]    [facility] Technical Issue Support Team--TEL:  [phone], Mon-Fri 9 am- 5 pm.    [email or web address] online patient  "portal    [facility] Financial Assistance TEL:  [phone]        [employer] Managed Care:    [employer] First 24 Hour Nurse Line--TEL:  [phone]    [employer] Man[employer] Care,  Over-the-Counter Benefit-- TEL:  [phone]  On line:  [employer] IMS, select "Over-the-Counter (OTC) items from the "Shop OTC & Supplies drop down at the top of the page    [employer] Managed Care, Karla Exercise- Arsen Ritter [facility]  On line [employer] Exercise and Health Education Resources        [email or web address]    [employer] Managed Care, "Mom's Meal", TEL: [phone]    [employer] Managed Care - Transportation [facility]-- TEL:  [phone]  Mon-Fri, 8 am-5 pm, 3 business days notice required.                                                          -3-          =       "

## 2022-06-20 ENCOUNTER — TELEPHONE (OUTPATIENT)
Dept: PHARMACY | Facility: CLINIC | Age: 77
End: 2022-06-20
Payer: MEDICARE

## 2022-06-20 ENCOUNTER — TELEPHONE (OUTPATIENT)
Dept: FAMILY MEDICINE | Facility: CLINIC | Age: 77
End: 2022-06-20
Payer: MEDICARE

## 2022-06-20 ENCOUNTER — PATIENT MESSAGE (OUTPATIENT)
Dept: PHARMACY | Facility: CLINIC | Age: 77
End: 2022-06-20
Payer: MEDICARE

## 2022-06-20 ENCOUNTER — TELEPHONE (OUTPATIENT)
Dept: PULMONOLOGY | Facility: CLINIC | Age: 77
End: 2022-06-20
Payer: MEDICARE

## 2022-06-20 ENCOUNTER — PATIENT MESSAGE (OUTPATIENT)
Dept: ADMINISTRATIVE | Facility: OTHER | Age: 77
End: 2022-06-20
Payer: MEDICARE

## 2022-06-20 DIAGNOSIS — D86.9 SARCOIDOSIS: Primary | ICD-10-CM

## 2022-06-20 DIAGNOSIS — G89.4 CHRONIC PAIN SYNDROME: ICD-10-CM

## 2022-06-20 NOTE — PROGRESS NOTES
Outpatient Care Management  Initial Patient Assessment    Patient: Regino Lawrence  MRN: [id number]  Date of Service: 06/17/2022  Completed by: Mirna Leos RN  Referral Date: 06/14/2022  Program: High Risk  Status: Ongoing  Effective Dates: 6/20/2022 - present  Responsible Staff: Mirna Leos RN        Reason for Visit   Patient presents with    OPCM Chart Review     6/17/2022    Initial Assessment     6/20/2022    Plan Of Care     6/20/2022       Brief Summary:  Regino Lawrence was referred during last hosp stay 6/13-6/14/2022 OBS- for Pulmonary Disease of Sarcoidosis. Patient qualifies for program based on high risk score 69.1%.   Active problem list, medical, surgical and social history reviewed. Regino lives in her own home together with her spouse and daughter. She reports staying in bed limited by SOB from minimal movement or exertion or from talking compounded by severe rated pain experienced to goldie hips & low back from OA.  Active comorbidities include HTN, DM2, CKD3,Sarcoidosis w/myopathy & arthropathy- on increased dose of Prednisone with latest hosp, SOB/FAUSTIN, debility,  DDD, osteoporosis,JOLLY w/weekly Retacrit infusions, Sabianist- no blood transfusions. H/o fall last July 2021 with new dxs seizures and goldie PEs- on anticoagulant therapy.  Areas of need identified by patient include scheduling Pulm appt & assess for home O2.eligibility. Regino would like a copy of the [employer] OTC Catalog. She believes Eliquis is her most expensive rx & agrees to PAP referral. Regino would like Home Health ordered.   Complex care plan created with patient/caregiver input.     Assessment Documentation     OPCM Initial Assessment    Involvement of Care  Do I have permission to speak with other family members about your care?: Yes  Assessment completed by: Patient  Identified Areas of Need  Advanced Care Planning: No  Housing: no  Medication Adherence: No  *Active medication list was reviewed and reconciled  with patient and/or caregiver:   Nutrition: no  Lab Adherence: no  Depression: Yes  Cognitive/Behavioral Health: no  Communication: no  Health Literacy: no  Fall risk?: No  Equipment/Supplies/Services: no (Comment: Daughter- bought an intercom monitor to listen)          Problem List and History     Problems Addressed This Visit    Hypertension: Identified as current problem  Diabetes: Not identified by patient as current problem  Anemia: Identified as current problem  Chronic Kidney Disease: Not identified by patient as current problem  Seizures: Not identified by patient as current problem         Reviewed medical and social history with patient and/or caregiver. A complex care plan was discussed and completed today, with input from patient and/or caregiver.    Patient Instructions     Instructions were provided via the [employer] patient resources and are available for the patient to view on the patient portal, if active.        Follow up in about 5 days (around 6/22/2022) for OPCM RN Follow Up to update care plan.    [facility] OPCM Self-Management Care Plan was developed with the patients/caregivers input and was based on identified barriers from todays assessment.  Goals were written today with the patient/caregiver and the patient has agreed to work towards these goals to improve his/her overall well-being. Patient verbalized understanding of the care plan, goals, and all of today's instructions. Encouraged patient/caregiver to communicate with his/her physician and health care team about health conditions and the treatment plan.  Provided my contact information today and encouraged patient/caregiver to call me with any questions as needed.

## 2022-06-20 NOTE — TELEPHONE ENCOUNTER
----- Message from Bryn Oro sent at 6/20/2022 12:33 PM CDT -----  Contact: @ [phone]  Patient calling to schedule a NP appt from the referral, rosana call

## 2022-06-20 NOTE — TELEPHONE ENCOUNTER
----- Message from Mirna Leos RN sent at 6/20/2022 12:56 PM CDT -----  Regarding: Outpatient Care Management Enrollment findings 6/20/2022  Dr. Micaela Mendoza/Staff:    Your patient, Regino Lawrence  was  referred to [facility] at time of last hosp admission and discharge 6/14/2022 for Sarcoidosis.     My name is Mirna Leos RN, Care Manager.    [facility].     In our enrollment encounter, the following needs were identified:  1) Med discrepancies: Regino says she is not taking brivaracetan (briviact) for szs (she is taking Keppra) and no longer taking duloxetine (Cymbalta).     2) PHQ9=18 score, denies SI. (If score is > or=3: Physician should be notified and use their clinical judgement about treatment based on patient's duration of symptoms and functional impairment. The score also warrants a referral to the Outpatient Care Management ).    3) Regino would like Home Health services for SN to check on her V.S., respiratory status. PT/OT.  If in agreement, please fax orders to [facility]-- FAX:  [phone].    She does not want assistance with bathing-her daughter helps her.     4) Regino now has a PULM appt to establish care 8/4. Does she need a f/u PCP appt?       All needs and services provided by [facility] Man[facility] and other care team members will be communicated to you via your Resource Pool.      Our documentation can be readily accessed in the EMR using the following tabs: Chart review -> Episodes: High Risk.     It is our goal to provide holistic care, if at any time you feel other areas of concern need to be addressed, please communicate with me by Inbasket messaging.      Thank you,  JAM Davidson, RN, CCM [facility]  [email or web address]  TEL:  [phone]

## 2022-06-20 NOTE — TELEPHONE ENCOUNTER
I called and left another message and  sent  letter to her my chart portal requesting necessary documents to submit an application to [facility] ([facility]).

## 2022-06-20 NOTE — TELEPHONE ENCOUNTER
Called patient and assisted with scheduling hospital f/u . She preferred 7/6 at 2:20 pm . Home Health order has been pend, please advise information in call note .

## 2022-06-20 NOTE — TELEPHONE ENCOUNTER
Spoke with patient, informed her that I have received her message. I advised patient that I can schedule her appointment with Dr Stauffer on 8/4/22 for 10:00 am. I also advised patient that I will place her appointment on wait list as well. Patient verbalized that she understand and accepted the appointment.

## 2022-06-21 ENCOUNTER — OUTPATIENT CASE MANAGEMENT (OUTPATIENT)
Dept: ADMINISTRATIVE | Facility: OTHER | Age: 77
End: 2022-06-21
Payer: MEDICARE

## 2022-06-21 NOTE — PROGRESS NOTES
Outpatient Care Management  Plan of Care Follow Up Visit    Patient: Regino Lawrence  MRN: [id number]  Date of Service: 06/21/2022  Completed by: Mirna Leos RN  Referral Date: 06/14/2022  Program:     Reason for Visit   Patient presents with    OPCM Chart Review     6/21/2022     Update Plan Of Care     6/21/2022       Brief Summary:     Care coordination in place for HH services, post-hosp PCP appt,and establish care with PULM appt.   Spoke briefly to advise pt of HH to call.     Patient Summary     Involvement of Care:  Do I have permission to speak with other family members about your care?       Patient Reported Labs & Vitals:  1.  Any Patient Reported Labs & Vitals?     2.  Patient Reported Blood Pressure:     3.  Patient Reported Pulse:     4.  Patient Reported Weight (Kg):     5.  Patient Reported Blood Glucose (mg/dl):       Medical and social history was reviewed with patient and/or caregiver.     Clinical Assessment     Reviewed and provided basic information on available community resources for mental health, transportation, wellness resources, and palliative care programs with patient and/or caregiver.     Complex Care Plan     Care plan was discussed and completed today with input from patient and/or caregiver.    Patient Instructions     Instructions were provided via the [employer] patient resources and are available for the patient to view on the patient portal.      Follow up in about 6 days (around 6/27/2022) for OPCM RN Follow Up to update care plan.    [facility] OPCM Self-Management Care Plan was developed with the patients/caregivers input and was based on identified barriers from todays assessment.  Goals were written today with the patient/caregiver and the patient has agreed to work towards these goals to improve his/her overall well-being. Patient verbalized understanding of the care plan, goals, and all of today's instructions. Encouraged patient/caregiver to communicate with his/her  physician and health care team about health conditions and the treatment plan.  Provided my contact information today and encouraged patient/caregiver to call me with any questions as needed.

## 2022-06-23 PROCEDURE — G0180 MD CERTIFICATION HHA PATIENT: HCPCS | Mod: ,,, | Performed by: FAMILY MEDICINE

## 2022-06-23 PROCEDURE — G0180 PR HOME HEALTH MD CERTIFICATION: ICD-10-PCS | Mod: ,,, | Performed by: FAMILY MEDICINE

## 2022-06-24 ENCOUNTER — INFUSION (OUTPATIENT)
Dept: INFUSION THERAPY | Facility: HOSPITAL | Age: 77
End: 2022-06-24
Attending: INTERNAL MEDICINE
Payer: MEDICARE

## 2022-06-24 VITALS
TEMPERATURE: 98 F | RESPIRATION RATE: 16 BRPM | HEART RATE: 93 BPM | SYSTOLIC BLOOD PRESSURE: 157 MMHG | DIASTOLIC BLOOD PRESSURE: 77 MMHG | OXYGEN SATURATION: 95 %

## 2022-06-24 DIAGNOSIS — D63.1 ANEMIA IN CHRONIC KIDNEY DISEASE, UNSPECIFIED CKD STAGE: ICD-10-CM

## 2022-06-24 DIAGNOSIS — D63.1 ANEMIA IN STAGE 3 CHRONIC KIDNEY DISEASE, UNSPECIFIED WHETHER STAGE 3A OR 3B CKD: Primary | ICD-10-CM

## 2022-06-24 DIAGNOSIS — N18.9 ANEMIA IN CHRONIC KIDNEY DISEASE, UNSPECIFIED CKD STAGE: ICD-10-CM

## 2022-06-24 DIAGNOSIS — D50.9 IRON DEFICIENCY ANEMIA, UNSPECIFIED IRON DEFICIENCY ANEMIA TYPE: ICD-10-CM

## 2022-06-24 DIAGNOSIS — N18.30 ANEMIA IN STAGE 3 CHRONIC KIDNEY DISEASE, UNSPECIFIED WHETHER STAGE 3A OR 3B CKD: Primary | ICD-10-CM

## 2022-06-24 DIAGNOSIS — Z53.1 REFUSAL OF BLOOD TRANSFUSIONS AS PATIENT IS JEHOVAH'S WITNESS: ICD-10-CM

## 2022-06-24 PROCEDURE — 96372 THER/PROPH/DIAG INJ SC/IM: CPT

## 2022-06-24 PROCEDURE — 63600175 PHARM REV CODE 636 W HCPCS: Mod: JG | Performed by: INTERNAL MEDICINE

## 2022-06-24 RX ADMIN — EPOETIN ALFA-EPBX 20000 UNITS: 20000 INJECTION, SOLUTION INTRAVENOUS; SUBCUTANEOUS at 11:06

## 2022-06-27 ENCOUNTER — TELEPHONE (OUTPATIENT)
Dept: HEMATOLOGY/ONCOLOGY | Facility: CLINIC | Age: 77
End: 2022-06-27
Payer: MEDICARE

## 2022-06-27 ENCOUNTER — OUTPATIENT CASE MANAGEMENT (OUTPATIENT)
Dept: ADMINISTRATIVE | Facility: OTHER | Age: 77
End: 2022-06-27
Payer: MEDICARE

## 2022-06-27 ENCOUNTER — OFFICE VISIT (OUTPATIENT)
Dept: OPHTHALMOLOGY | Facility: CLINIC | Age: 77
End: 2022-06-27
Payer: MEDICARE

## 2022-06-27 DIAGNOSIS — H52.7 REFRACTIVE ERROR: ICD-10-CM

## 2022-06-27 DIAGNOSIS — Z96.1 PSEUDOPHAKIA: ICD-10-CM

## 2022-06-27 DIAGNOSIS — H04.123 DRY EYE SYNDROME, BILATERAL: Primary | ICD-10-CM

## 2022-06-27 DIAGNOSIS — D63.1 ANEMIA IN CHRONIC KIDNEY DISEASE, UNSPECIFIED CKD STAGE: Primary | ICD-10-CM

## 2022-06-27 DIAGNOSIS — H17.9 CORNEAL SCAR, RIGHT EYE: ICD-10-CM

## 2022-06-27 DIAGNOSIS — N18.9 ANEMIA IN CHRONIC KIDNEY DISEASE, UNSPECIFIED CKD STAGE: Primary | ICD-10-CM

## 2022-06-27 DIAGNOSIS — H34.8120 HEMISPHERIC RETINAL VEIN OCCLUSION WITH MACULAR EDEMA OF LEFT EYE: ICD-10-CM

## 2022-06-27 PROCEDURE — 1100F PR PT FALLS ASSESS DOC 2+ FALLS/FALL W/INJURY/YR: ICD-10-PCS | Mod: CPTII,S$GLB,, | Performed by: OPHTHALMOLOGY

## 2022-06-27 PROCEDURE — 1159F PR MEDICATION LIST DOCUMENTED IN MEDICAL RECORD: ICD-10-PCS | Mod: CPTII,S$GLB,, | Performed by: OPHTHALMOLOGY

## 2022-06-27 PROCEDURE — 3288F PR FALLS RISK ASSESSMENT DOCUMENTED: ICD-10-PCS | Mod: CPTII,S$GLB,, | Performed by: OPHTHALMOLOGY

## 2022-06-27 PROCEDURE — 1159F MED LIST DOCD IN RCRD: CPT | Mod: CPTII,S$GLB,, | Performed by: OPHTHALMOLOGY

## 2022-06-27 PROCEDURE — 1157F ADVNC CARE PLAN IN RCRD: CPT | Mod: CPTII,S$GLB,, | Performed by: OPHTHALMOLOGY

## 2022-06-27 PROCEDURE — 92012 PR EYE EXAM, EST PATIENT,INTERMED: ICD-10-PCS | Mod: S$GLB,,, | Performed by: OPHTHALMOLOGY

## 2022-06-27 PROCEDURE — 99999 PR PBB SHADOW E&M-EST. PATIENT-LVL II: ICD-10-PCS | Mod: PBBFAC,,, | Performed by: OPHTHALMOLOGY

## 2022-06-27 PROCEDURE — 1100F PTFALLS ASSESS-DOCD GE2>/YR: CPT | Mod: CPTII,S$GLB,, | Performed by: OPHTHALMOLOGY

## 2022-06-27 PROCEDURE — 99999 PR PBB SHADOW E&M-EST. PATIENT-LVL II: CPT | Mod: PBBFAC,,, | Performed by: OPHTHALMOLOGY

## 2022-06-27 PROCEDURE — 1157F PR ADVANCE CARE PLAN OR EQUIV PRESENT IN MEDICAL RECORD: ICD-10-PCS | Mod: CPTII,S$GLB,, | Performed by: OPHTHALMOLOGY

## 2022-06-27 PROCEDURE — 3288F FALL RISK ASSESSMENT DOCD: CPT | Mod: CPTII,S$GLB,, | Performed by: OPHTHALMOLOGY

## 2022-06-27 PROCEDURE — 1160F PR REVIEW ALL MEDS BY PRESCRIBER/CLIN PHARMACIST DOCUMENTED: ICD-10-PCS | Mod: CPTII,S$GLB,, | Performed by: OPHTHALMOLOGY

## 2022-06-27 PROCEDURE — 1160F RVW MEDS BY RX/DR IN RCRD: CPT | Mod: CPTII,S$GLB,, | Performed by: OPHTHALMOLOGY

## 2022-06-27 PROCEDURE — 92012 INTRM OPH EXAM EST PATIENT: CPT | Mod: S$GLB,,, | Performed by: OPHTHALMOLOGY

## 2022-06-28 ENCOUNTER — TELEPHONE (OUTPATIENT)
Dept: FAMILY MEDICINE | Facility: CLINIC | Age: 77
End: 2022-06-28
Payer: MEDICARE

## 2022-06-28 DIAGNOSIS — E11.22 DIABETES MELLITUS WITH STAGE 3 CHRONIC KIDNEY DISEASE: ICD-10-CM

## 2022-06-28 DIAGNOSIS — N18.30 DIABETES MELLITUS WITH STAGE 3 CHRONIC KIDNEY DISEASE: ICD-10-CM

## 2022-06-28 DIAGNOSIS — E11.9 DIABETES MELLITUS, TYPE 2: Chronic | ICD-10-CM

## 2022-06-28 NOTE — TELEPHONE ENCOUNTER
No new care gaps identified.  [facility] Embedded Care Gaps. Reference number: [id number]. 6/28/2022   12:34:48 PM CDT

## 2022-06-28 NOTE — PROGRESS NOTES
"Subjective:       Patient ID: Regino Lawrence is a 76 y.o. female.    Chief Complaint: Blurred Vision (Patient Regino Lawrence is a 76 year old female.)    HPI     Blurred Vision      Additional comments: Patient Regino Lawrence is a 76 year old female.              Comments     Pt here for annual refraction only. Pt states that words "run together"   when trying to see at both distance and near. Pt states trouble with glare   OU. Pt states that her eyes swelling with burning, itching, and lid   swelling. Pt states that Pataday qday OU help some. Patient denies   diplopia, headaches, flashes/floaters, and pain.    DLS: 01/12/2022 with Dr. Baker and 05/03/2021 with Dr. Azevedo    Gtts:  1. Pataday qday OU  2. Systane PRNOU    POHx:  1. Hemispheric retinal vein occlusion with macular edema of left eye  2. Dry eye syndrome, bilateral  3. Corneal scar, right eye  4. DM type 2 without retinopathy   5. Allergic conjunctivitis of both eyes    (+)DM  Hemoglobin A1C       Date                     Value               Ref Range             Status                04/04/2022               5.1                 4.0 - 5.6 %           Final              Comment:    ADA Screening Guidelines:  5.7-6.4%  Consistent with   prediabetes  >or=6.5%  Consistent with diabetes    High levels of fetal   hemoglobin interfere with the HbA1C  assay. Heterozygous hemoglobin   variants (HbS, HgC, etc)do  not significantly interfere with this assay.     However, presence of multiple variants may affect accuracy.         07/08/2021               5.6                 4.0 - 5.6 %           Final              Comment:    ADA Screening Guidelines:  5.7-6.4%  Consistent with   prediabetes  >or=6.5%  Consistent with diabetes    High levels of fetal   hemoglobin interfere with the HbA1C  assay. Heterozygous hemoglobin   variants (HbS, HgC, etc)do  not significantly interfere with this assay.     However, presence of multiple variants may affect accuracy.     "     02/24/2021               6.1 (H)             4.0 - 5.6 %           Final              Comment:    ADA Screening Guidelines:  5.7-6.4%  Consistent with   prediabetes  >or=6.5%  Consistent with diabetes    High levels of fetal   hemoglobin interfere with the HbA1C  assay. Heterozygous hemoglobin   variants (HbS, HgC, etc)do  not significantly interfere with this assay.     However, presence of multiple variants may affect accuracy.    ----------                 Last edited by Dian Martinez on 6/27/2022  2:42 PM. (History)             Assessment:       1. Dry eye syndrome, bilateral    2. Corneal scar, right eye    3. 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 Refractive error    5. Pseudophakia        Plan:       JOSELINE-Doing well.  K scar OD-Stable.  Hemispheric RVO with ME OS-Followed by Dr Baker.  RE-Pt wants MRx.      AT's.  Give MRx.  RTC Dr Baker as scheduled.  RTC me prn.

## 2022-06-28 NOTE — TELEPHONE ENCOUNTER
Refill Routing Note   Medication(s) are not appropriate for processing by [facility] for the following reason(s):      - Patient has been seen in the ED/Hospital since the last PCP visit    [facility] action(s):  Defer          Medication reconciliation completed: No     Appointments  past 12m or future 3m with PCP    Date Provider   Last Visit   4/4/2022 Micaela Mendoza MD   Next Visit   7/6/2022 Micaela Mendoza MD   ED visits in past 90 days: 0        Note composed:4:48 PM 06/28/2022

## 2022-06-29 ENCOUNTER — PATIENT MESSAGE (OUTPATIENT)
Dept: FAMILY MEDICINE | Facility: CLINIC | Age: 77
End: 2022-06-29
Payer: MEDICARE

## 2022-06-29 DIAGNOSIS — E78.2 COMBINED HYPERLIPIDEMIA ASSOCIATED WITH TYPE 2 DIABETES MELLITUS: Chronic | ICD-10-CM

## 2022-06-29 DIAGNOSIS — E11.69 COMBINED HYPERLIPIDEMIA ASSOCIATED WITH TYPE 2 DIABETES MELLITUS: Chronic | ICD-10-CM

## 2022-06-29 NOTE — TELEPHONE ENCOUNTER
Refill Routing Note   Medication(s) are not appropriate for processing by [facility] for the following reason(s):      - Patient has been seen in the ED/Hospital since the last PCP visit    [facility] action(s):  Defer          Medication reconciliation completed: No     Appointments  past 12m or future 3m with PCP    Date Provider   Last Visit   4/4/2022 Micaela Mendoza MD   Next Visit   7/6/2022 Micaela Mendoza MD   ED visits in past 90 days: 0        Note composed:5:05 PM 06/29/2022

## 2022-06-29 NOTE — TELEPHONE ENCOUNTER
No new care gaps identified.  [facility] Embedded Care Gaps. Reference number: [id number]. 6/29/2022   12:12:01 PM CDT

## 2022-06-30 ENCOUNTER — PATIENT MESSAGE (OUTPATIENT)
Dept: FAMILY MEDICINE | Facility: CLINIC | Age: 77
End: 2022-06-30
Payer: MEDICARE

## 2022-06-30 RX ORDER — METFORMIN HYDROCHLORIDE 1000 MG/1
1000 TABLET ORAL 2 TIMES DAILY WITH MEALS
Qty: 180 TABLET | Refills: 1 | OUTPATIENT
Start: 2022-06-30

## 2022-06-30 RX ORDER — FENOFIBRATE 160 MG/1
160 TABLET ORAL DAILY
Qty: 90 TABLET | Refills: 1 | Status: SHIPPED | OUTPATIENT
Start: 2022-06-30 | End: 2022-09-28 | Stop reason: SDUPTHER

## 2022-06-30 NOTE — TELEPHONE ENCOUNTER
Please let her know I am stopping metformin because her recent labs show her kidney function has decreased and metformin would not be safe to continue.    We can discuss other options at her visit 7/6    Micaela Mendoza MD

## 2022-06-30 NOTE — TELEPHONE ENCOUNTER
Spoke with pt and she will stop metformin based on lab results per  at the next office visit provider will discuss alternatives on the pts next office visit 7/6/2022    Pt displayed understanding .

## 2022-07-06 ENCOUNTER — OFFICE VISIT (OUTPATIENT)
Dept: FAMILY MEDICINE | Facility: CLINIC | Age: 77
End: 2022-07-06
Payer: MEDICARE

## 2022-07-06 ENCOUNTER — OUTPATIENT CASE MANAGEMENT (OUTPATIENT)
Dept: ADMINISTRATIVE | Facility: OTHER | Age: 77
End: 2022-07-06
Payer: MEDICARE

## 2022-07-06 VITALS
OXYGEN SATURATION: 96 % | TEMPERATURE: 98 F | RESPIRATION RATE: 14 BRPM | HEART RATE: 105 BPM | BODY MASS INDEX: 24.66 KG/M2 | DIASTOLIC BLOOD PRESSURE: 80 MMHG | HEIGHT: 62 IN | SYSTOLIC BLOOD PRESSURE: 150 MMHG | WEIGHT: 134 LBS

## 2022-07-06 DIAGNOSIS — R06.02 SHORTNESS OF BREATH: Primary | ICD-10-CM

## 2022-07-06 DIAGNOSIS — I10 HYPERTENSION, UNCONTROLLED: ICD-10-CM

## 2022-07-06 DIAGNOSIS — D86.9 SARCOIDOSIS: Chronic | ICD-10-CM

## 2022-07-06 DIAGNOSIS — R14.0 BLOATING: ICD-10-CM

## 2022-07-06 DIAGNOSIS — E11.22 DIABETES MELLITUS WITH STAGE 3 CHRONIC KIDNEY DISEASE: ICD-10-CM

## 2022-07-06 DIAGNOSIS — D50.9 IRON DEFICIENCY ANEMIA, UNSPECIFIED IRON DEFICIENCY ANEMIA TYPE: ICD-10-CM

## 2022-07-06 DIAGNOSIS — D75.89 MACROCYTOSIS WITHOUT ANEMIA: ICD-10-CM

## 2022-07-06 DIAGNOSIS — N18.30 DIABETES MELLITUS WITH STAGE 3 CHRONIC KIDNEY DISEASE: ICD-10-CM

## 2022-07-06 PROCEDURE — 1157F PR ADVANCE CARE PLAN OR EQUIV PRESENT IN MEDICAL RECORD: ICD-10-PCS | Mod: CPTII,S$GLB,, | Performed by: FAMILY MEDICINE

## 2022-07-06 PROCEDURE — 99999 PR PBB SHADOW E&M-EST. PATIENT-LVL V: CPT | Mod: PBBFAC,,, | Performed by: FAMILY MEDICINE

## 2022-07-06 PROCEDURE — 3079F PR MOST RECENT DIASTOLIC BLOOD PRESSURE 80-89 MM HG: ICD-10-PCS | Mod: CPTII,S$GLB,, | Performed by: FAMILY MEDICINE

## 2022-07-06 PROCEDURE — 1125F PR PAIN SEVERITY QUANTIFIED, PAIN PRESENT: ICD-10-PCS | Mod: CPTII,S$GLB,, | Performed by: FAMILY MEDICINE

## 2022-07-06 PROCEDURE — 99215 OFFICE O/P EST HI 40 MIN: CPT | Mod: S$GLB,,, | Performed by: FAMILY MEDICINE

## 2022-07-06 PROCEDURE — 1101F PT FALLS ASSESS-DOCD LE1/YR: CPT | Mod: CPTII,S$GLB,, | Performed by: FAMILY MEDICINE

## 2022-07-06 PROCEDURE — 99999 PR PBB SHADOW E&M-EST. PATIENT-LVL V: ICD-10-PCS | Mod: PBBFAC,,, | Performed by: FAMILY MEDICINE

## 2022-07-06 PROCEDURE — 3079F DIAST BP 80-89 MM HG: CPT | Mod: CPTII,S$GLB,, | Performed by: FAMILY MEDICINE

## 2022-07-06 PROCEDURE — 1159F PR MEDICATION LIST DOCUMENTED IN MEDICAL RECORD: ICD-10-PCS | Mod: CPTII,S$GLB,, | Performed by: FAMILY MEDICINE

## 2022-07-06 PROCEDURE — 1101F PR PT FALLS ASSESS DOC 0-1 FALLS W/OUT INJ PAST YR: ICD-10-PCS | Mod: CPTII,S$GLB,, | Performed by: FAMILY MEDICINE

## 2022-07-06 PROCEDURE — 3288F PR FALLS RISK ASSESSMENT DOCUMENTED: ICD-10-PCS | Mod: CPTII,S$GLB,, | Performed by: FAMILY MEDICINE

## 2022-07-06 PROCEDURE — 3077F PR MOST RECENT SYSTOLIC BLOOD PRESSURE >= 140 MM HG: ICD-10-PCS | Mod: CPTII,S$GLB,, | Performed by: FAMILY MEDICINE

## 2022-07-06 PROCEDURE — 3072F PR LOW RISK FOR RETINOPATHY: ICD-10-PCS | Mod: CPTII,S$GLB,, | Performed by: FAMILY MEDICINE

## 2022-07-06 PROCEDURE — 1125F AMNT PAIN NOTED PAIN PRSNT: CPT | Mod: CPTII,S$GLB,, | Performed by: FAMILY MEDICINE

## 2022-07-06 PROCEDURE — 99215 PR OFFICE/OUTPT VISIT, EST, LEVL V, 40-54 MIN: ICD-10-PCS | Mod: S$GLB,,, | Performed by: FAMILY MEDICINE

## 2022-07-06 PROCEDURE — 1159F MED LIST DOCD IN RCRD: CPT | Mod: CPTII,S$GLB,, | Performed by: FAMILY MEDICINE

## 2022-07-06 PROCEDURE — 1157F ADVNC CARE PLAN IN RCRD: CPT | Mod: CPTII,S$GLB,, | Performed by: FAMILY MEDICINE

## 2022-07-06 PROCEDURE — 3288F FALL RISK ASSESSMENT DOCD: CPT | Mod: CPTII,S$GLB,, | Performed by: FAMILY MEDICINE

## 2022-07-06 PROCEDURE — 3072F LOW RISK FOR RETINOPATHY: CPT | Mod: CPTII,S$GLB,, | Performed by: FAMILY MEDICINE

## 2022-07-06 PROCEDURE — 3077F SYST BP >= 140 MM HG: CPT | Mod: CPTII,S$GLB,, | Performed by: FAMILY MEDICINE

## 2022-07-06 RX ORDER — FLUTICASONE PROPIONATE AND SALMETEROL XINAFOATE 115; 21 UG/1; UG/1
2 AEROSOL, METERED RESPIRATORY (INHALATION) EVERY 12 HOURS
Qty: 12 G | Refills: 3 | Status: SHIPPED | OUTPATIENT
Start: 2022-07-06 | End: 2024-03-18

## 2022-07-06 RX ORDER — HYDRALAZINE HYDROCHLORIDE 25 MG/1
TABLET, FILM COATED ORAL
Qty: 120 TABLET | Refills: 1 | Status: SHIPPED | OUTPATIENT
Start: 2022-07-06 | End: 2022-09-13 | Stop reason: SDUPTHER

## 2022-07-06 RX ORDER — PANTOPRAZOLE SODIUM 40 MG/1
40 TABLET, DELAYED RELEASE ORAL DAILY
Qty: 90 TABLET | Refills: 1 | Status: SHIPPED | OUTPATIENT
Start: 2022-07-06 | End: 2023-05-04 | Stop reason: SDUPTHER

## 2022-07-06 NOTE — PROGRESS NOTES
1st Attempt to complete Social Work Assessment for Outpatient Care Management; left message requesting a return call.  Pt had a doctor appt today at 2:20 pm.  LCSW will reattempt at a later date.

## 2022-07-06 NOTE — PROGRESS NOTES
Chief Complaint   Patient presents with    Shortness of Breath    Extremity Weakness       HPI  Regino Lawrence is a 76 y.o. 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, without long-term current use of insulin    Sarcoidosis    Corneal scar, right eye    Nuclear sclerosis    Senile nuclear sclerosis    Left shoulder pain    Cervical spinal stenosis    Patient is Christian    GERD (gastroesophageal reflux disease)    Debility    S/P cervical spinal fusion    Chronic bilateral low back pain with left-sided sciatica    Degenerative disc disease, lumbar    Poor motor control of trunk    Impaired mobility    Muscle weakness    Iron deficiency anemia    Anemia in CKD (chronic kidney disease)    Refusal of blood transfusions as patient is Christian    Current use of steroid medication    DM type 2 without retinopathy    Pseudophakia    Insufficiency of tear film of both eyes    Refractive error    Myopathy    Osteopenia    Calcification of aorta    Dry eye syndrome, bilateral    Neck pain    Lumbar disc herniation with radiculopathy    Antalgic gait    Lumbar radiculopathy    Lumbar stenosis with neurogenic claudication    Anemia in stage 3 chronic kidney disease, unspecified whether stage 3a or 3b CKD    Greater trochanteric bursitis of left hip    Left hip pain    FAUSTIN (dyspnea on exertion)    Chest pain, atypical    Hemispheric retinal vein occlusion with macular  edema of left eye    Degenerative joint disease (DJD) of hip    Chronic pain    Left sided numbness    Sacroiliitis    Osteoarthritis of hip    Macrocytosis without anemia    Seizure    T4 vertebral fracture    Cervical subluxation    Stenosis of cervical spine    History of noncompliance with medical treatment    Generalized anxiety disorder    Shortness of breath       Presenting for follow-up for recent hospital stay for hypoxia and SOB    Patient Name: Regino Lawrence  MRN: [id number]  Patient Class: OP- Observation  Admission Date: 6/13/2022  Attending Physician: Ritchie Chaparro MD   Primary Care Provider: Micaela Mendoza MD      Shortness of Breath       Pt sent down from hematology for c/o increased SOB, CP, weakness and shaking. Pt sent down for evaluation on oxygen           HPI: 76 y.o. female with hypothyroidism, HLD, HTN, DM2, Sarcoidosis, debility, anemia of ckd, chronic pain and fatigue, anxiety, and seizure disorder presents from Hem/Onc clinic for evaluation of shortness of breath. Acute onset, duration 1 week, associated with fatigue and lower extremity edema, exacerbated by exertion.  Denies fever, chills, cough, chest pain, palpitations, orthopnea, PND, syncope, n/v/d, abdominal pain, or dysuria.  In the ED, routine labs, d-dimer, BNP, troponin, CXR and CTA chest all unremarkable for acute abnormality.  Reportedly 92% sats on room air and observation for hypoxia requested.     Hospital Course:   Patient was admitted overnight secondary to concern for hypoxia, placed on 1 L nasal cannula and was quickly transitioned to room air.  This morning she is sitting upright in bed, reports feeling much better, denies any shortness of breath currently and is eager to go home.  On labs and imaging reviewed, patient is to follow-up with her primary care physician.  She expresses understanding and agreement with plan.    Present day  She is still having shortness of breath fatigue, labs and  "imaging studies reviewed, hx of anemia, but unable to receive transfusions as she is a [employer]ianist  She is getting infusions of EPO every 14 days with oncology  She has increased her prednisone to 10mg per rheumatology  She has not had hx of pulmonary sarcoid previously; she has f/u with pulm in August  She is having trouble with exertion, feeling cold and moving around at home. Has HH with [facility]   She is also having increasing heartburn and bloating, stools regular, not black  Elevated BG since we stopped metformin       ROS  Review of Systems   Constitutional: Positive for fever. Negative for chills, diaphoresis, fatigue and unexpected weight change.   HENT: Negative for rhinorrhea, sinus pressure, sore throat and tinnitus.    Eyes: Negative for photophobia and visual disturbance.   Respiratory: Negative for cough, shortness of breath and wheezing.    Cardiovascular: Negative for chest pain and palpitations.   Gastrointestinal: Positive for abdominal distention. Negative for abdominal pain, blood in stool, constipation, diarrhea, nausea and vomiting.   Genitourinary: Negative for dysuria, flank pain, frequency and vaginal discharge.   Musculoskeletal: Positive for arthralgias. Negative for joint swelling.   Skin: Negative for rash.   Neurological: Negative for speech difficulty, weakness, light-headedness and headaches.   Psychiatric/Behavioral: Negative for behavioral problems and dysphoric mood.       Physical Exam  Vitals:    07/06/22 1428   BP: (!) 150/80   Pulse: 105   Resp: 14   Temp: 98 °F (36.7 °C)   TempSrc: Oral   SpO2: 96%   Weight: 60.8 kg (134 lb)   Height: 5' 2" (1.575 m)    Body mass index is 24.51 kg/m².  Weight: 60.8 kg (134 lb)   Height: 5' 2" (157.5 cm)     Physical Exam  Vitals reviewed.   Constitutional:       General: She is not in acute distress.     Appearance: She is well-developed.   HENT:      Head: Normocephalic and atraumatic.   Cardiovascular:      Rate and Rhythm: Normal rate " and regular rhythm.      Heart sounds: No murmur heard.    No friction rub. No gallop.   Pulmonary:      Effort: Pulmonary effort is normal. No respiratory distress.      Breath sounds: Normal breath sounds. No wheezing or rales.   Abdominal:      General: Abdomen is flat. Bowel sounds are normal. There is distension.      Tenderness: There is no abdominal tenderness.   Skin:     General: Skin is warm and dry.      Findings: No rash.   Neurological:      Mental Status: She is alert. Mental status is at baseline.   Psychiatric:         Behavior: Behavior normal.         Thought Content: Thought content normal.         ALLERGIES AND MEDICATIONS: updated and reviewed.  Review of patient's allergies indicates:   Allergen Reactions    Azathioprine Shortness Of Breath and Other (See Comments)     Fatigue     Medication List with Changes/Refills   New Medications    FLUTICASONE PROPION-SALMETEROL 115-21 MCG/DOSE (ADVAIR HFA) 115-21 MCG/ACTUATION HFAA INHALER    Inhale 2 puffs into the lungs every 12 (twelve) hours. Controller    INSULIN DETEMIR U-100 (LEVEMIR FLEXTOUCH) 100 UNIT/ML (3 ML) SUBQ INPN PEN    Inject 10 Units into the skin every evening.    PANTOPRAZOLE (PROTONIX) 40 MG TABLET    Take 1 tablet (40 mg total) by mouth once daily.   Current Medications    ACCU-CHEK FASTCLIX LANCING DEV KIT    USE AS DIRECTED.    ALCOHOL ANTISEPTIC PADS (ALCOHOL PREP PADS TOP)        APIXABAN (ELIQUIS) 5 MG TAB    Take 1 tablet (5 mg total) by mouth 2 (two) times daily. Start this prescription after finishing starter pack    ATORVASTATIN (LIPITOR) 20 MG TABLET    Take 1 tablet by mouth once daily    BLOOD SUGAR DIAGNOSTIC (ACCU-CHEK SMARTVIEW TEST STRIP) STRP    Use as directed to check blood sugar twice daily.    BLOOD-GLUCOSE METER KIT    Use as instructed    BRIVARACETAM (BRIVIACT) 75 MG TAB    Take 1 tablet (75 mg total) by mouth 2 (two) times daily.    CALCIUM CARBONATE (OS-MALCOLM) 600 MG CALCIUM (1,500 MG) TAB    Take 1 tablet  by mouth 2 (two) times daily.     CARVEDILOL (COREG) 25 MG TABLET    Take 1 tablet (25 mg total) by mouth 2 (two) times daily.    DULOXETINE (CYMBALTA) 20 MG CAPSULE    Take 1 capsule (20 mg total) by mouth once daily.    EPOETIN WOLFGANG-EPBX (RETACRIT) 10,000 UNIT/ML IMJECTION    Inject 20,000 Units into the skin every 14 (fourteen) days.    FENOFIBRATE 160 MG TAB    Take 1 tablet (160 mg total) by mouth once daily.    FOLIC ACID (FOLVITE) 1 MG TABLET    Take 1 tablet (1,000 mcg total) by mouth once daily.    LEVETIRACETAM (KEPPRA) 250 MG TAB    Take 1 tablet (250 mg total) by mouth 2 (two) times daily.    LEVOTHYROXINE (SYNTHROID) 50 MCG TABLET    Take 1 tablet (50 mcg total) by mouth before breakfast.    NIFEDIPINE (PROCARDIA-XL) 60 MG (OSM) 24 HR TABLET    Take 1 tablet (60 mg total) by mouth once daily.    OLOPATADINE (PATANOL) 0.1 % OPHTHALMIC SOLUTION    Place 1 drop into both eyes daily as needed for Allergies.    POTASSIUM CHLORIDE SA (K-DUR,KLOR-CON) 20 MEQ TABLET    Take 1 tablet (20 mEq total) by mouth 2 (two) times daily.    PREDNISONE (DELTASONE) 5 MG TABLET    Take 1 tablet (5 mg total) by mouth once daily.    TIZANIDINE (ZANAFLEX) 2 MG TABLET    Take 2 tablets (4 mg total) by mouth every 8 (eight) hours as needed (muscle spasm).   Changed and/or Refilled Medications    Modified Medication Previous Medication    HYDRALAZINE (APRESOLINE) 25 MG TABLET hydrALAZINE (APRESOLINE) 25 MG tablet       Take 2 tabs every 8 hours by mouth. Check BP 2 hours after each dose and if Blood pressure >160- please take 1 extra tab    Take 2 tabs every 8 hours by mouth. Check BP 2 hours after each dose and if Blood pressure >160- please take 1 extra tab   Discontinued Medications    METFORMIN (GLUCOPHAGE) 1000 MG TABLET    TAKE 1 TABLET BY MOUTH TWICE DAILY WITH MEALS       Assessment & Plan  1. Shortness of breath    2. Sarcoidosis    3. Iron deficiency anemia, unspecified iron deficiency anemia type    4. Macrocytosis  without anemia    5. Bloating    6. Hypertension, uncontrolled    7. Diabetes mellitus with stage 3 chronic kidney disease        Problem List Items Addressed This Visit        Pulmonary    Shortness of breath - Primary  Begin inhaled steroid   F/u with pulm  Will order HH to do home oxygen testing on exertion    Relevant Medications    fluticasone propion-salmeterol 115-21 mcg/dose (ADVAIR HFA) 115-21 mcg/actuation HFAA inhaler    Other Relevant Orders    SUBSEQUENT HOME HEALTH ORDERS       Immunology/Multi System    Sarcoidosis (Chronic)  Begin inhaled steroid  Has f/u with pulm   On increased prednisone dose    Relevant Medications    fluticasone propion-salmeterol 115-21 mcg/dose (ADVAIR HFA) 115-21 mcg/actuation HFAA inhaler       Oncology    Iron deficiency anemia  Continue to follow up with hematology    Macrocytosis without anemia  Continue to follow up with hematology      Other Visit Diagnoses     Bloating      Begin PPI due to steroid increase    Relevant Medications    pantoprazole (PROTONIX) 40 MG tablet    Hypertension, uncontrolled      Resume hydralazine    Relevant Medications    hydrALAZINE (APRESOLINE) 25 MG tablet    Diabetes mellitus with stage 3 chronic kidney disease    Begin lantus 10 units   D/c metformin       Relevant Medications    insulin detemir U-100 (LEVEMIR FLEXTOUCH) 100 unit/mL (3 mL) SubQ InPn pen    Other Relevant Orders    Comprehensive Metabolic Panel          Follow up in about 6 weeks (around 8/17/2022) for management of chronic conditions.    Other Orders Placed This Visit:  Orders Placed This Encounter   Procedures    Comprehensive Metabolic Panel    SUBSEQUENT HOME HEALTH ORDERS

## 2022-07-07 ENCOUNTER — PATIENT MESSAGE (OUTPATIENT)
Dept: ADMINISTRATIVE | Facility: OTHER | Age: 77
End: 2022-07-07
Payer: MEDICARE

## 2022-07-07 ENCOUNTER — PATIENT MESSAGE (OUTPATIENT)
Dept: FAMILY MEDICINE | Facility: CLINIC | Age: 77
End: 2022-07-07
Payer: MEDICARE

## 2022-07-07 ENCOUNTER — TELEPHONE (OUTPATIENT)
Dept: FAMILY MEDICINE | Facility: CLINIC | Age: 77
End: 2022-07-07
Payer: MEDICARE

## 2022-07-07 DIAGNOSIS — E11.22 DIABETES MELLITUS WITH STAGE 3 CHRONIC KIDNEY DISEASE: Primary | ICD-10-CM

## 2022-07-07 DIAGNOSIS — N18.30 DIABETES MELLITUS WITH STAGE 3 CHRONIC KIDNEY DISEASE: Primary | ICD-10-CM

## 2022-07-07 RX ORDER — BLOOD SUGAR DIAGNOSTIC
STRIP MISCELLANEOUS
Qty: 90 EACH | Refills: 2 | Status: SHIPPED | OUTPATIENT
Start: 2022-07-07 | End: 2023-04-15 | Stop reason: SDUPTHER

## 2022-07-07 RX ORDER — BLOOD SUGAR DIAGNOSTIC
STRIP MISCELLANEOUS
Qty: 90 EACH | Refills: 2 | Status: SHIPPED | OUTPATIENT
Start: 2022-07-07 | End: 2022-07-07 | Stop reason: SDUPTHER

## 2022-07-07 NOTE — PROGRESS NOTES
2nd attempt to complete Social Work Assessment for OPCM. LCSW sent a message to the patient portal with contact information requesting a return call.  OPCM RN notified.

## 2022-07-07 NOTE — TELEPHONE ENCOUNTER
Spoke with Violeta Mcleod about patient's needles being sent to Pharmacy. She reported that she was able to test patient's oxygen - patient was able to tolerate walking for 5 minutes and her O2 %, she was getting too tired to walk longer than that. She did state her heart rate reached the 113-119, but was able to return to her baseline after sitting down. No complaints or acute distress after.

## 2022-07-07 NOTE — TELEPHONE ENCOUNTER
----- Message from Roro Adorno sent at 7/7/2022 12:10 PM CDT -----  Regarding: HH  .Type: Patient Call Back    Who called: Violeta nash     What is the request in detail: home health reporting findings - patients caregivers informed them she does not have needles for insulin injection.     Can the clinic reply by [facility]?    Would the patient rather a call back or a response via [facility]?    Best call back number:   [phone]

## 2022-07-08 ENCOUNTER — INFUSION (OUTPATIENT)
Dept: INFUSION THERAPY | Facility: HOSPITAL | Age: 77
End: 2022-07-08
Attending: INTERNAL MEDICINE
Payer: MEDICARE

## 2022-07-08 VITALS
SYSTOLIC BLOOD PRESSURE: 138 MMHG | HEART RATE: 91 BPM | DIASTOLIC BLOOD PRESSURE: 67 MMHG | TEMPERATURE: 99 F | OXYGEN SATURATION: 95 % | RESPIRATION RATE: 16 BRPM

## 2022-07-08 DIAGNOSIS — N18.30 ANEMIA IN STAGE 3 CHRONIC KIDNEY DISEASE, UNSPECIFIED WHETHER STAGE 3A OR 3B CKD: Primary | ICD-10-CM

## 2022-07-08 DIAGNOSIS — D63.1 ANEMIA IN STAGE 3 CHRONIC KIDNEY DISEASE, UNSPECIFIED WHETHER STAGE 3A OR 3B CKD: Primary | ICD-10-CM

## 2022-07-08 DIAGNOSIS — Z53.1 REFUSAL OF BLOOD TRANSFUSIONS AS PATIENT IS JEHOVAH'S WITNESS: ICD-10-CM

## 2022-07-08 DIAGNOSIS — D63.1 ANEMIA IN CHRONIC KIDNEY DISEASE, UNSPECIFIED CKD STAGE: ICD-10-CM

## 2022-07-08 DIAGNOSIS — D50.9 IRON DEFICIENCY ANEMIA, UNSPECIFIED IRON DEFICIENCY ANEMIA TYPE: ICD-10-CM

## 2022-07-08 DIAGNOSIS — N18.9 ANEMIA IN CHRONIC KIDNEY DISEASE, UNSPECIFIED CKD STAGE: ICD-10-CM

## 2022-07-08 PROCEDURE — 63600175 PHARM REV CODE 636 W HCPCS: Mod: JG,EC | Performed by: INTERNAL MEDICINE

## 2022-07-08 RX ADMIN — EPOETIN ALFA-EPBX 20000 UNITS: 20000 INJECTION, SOLUTION INTRAVENOUS; SUBCUTANEOUS at 11:07

## 2022-07-08 NOTE — PLAN OF CARE
Pt arrived to unit. VSS. Pt reports feeling better today than this week. Tolerated injection. Pt has next appts. Pt discharged off unit.

## 2022-07-11 ENCOUNTER — PATIENT MESSAGE (OUTPATIENT)
Dept: FAMILY MEDICINE | Facility: CLINIC | Age: 77
End: 2022-07-11
Payer: MEDICARE

## 2022-07-11 DIAGNOSIS — E11.22 DIABETES MELLITUS WITH STAGE 3 CHRONIC KIDNEY DISEASE: Primary | ICD-10-CM

## 2022-07-11 DIAGNOSIS — N18.30 DIABETES MELLITUS WITH STAGE 3 CHRONIC KIDNEY DISEASE: Primary | ICD-10-CM

## 2022-07-11 NOTE — TELEPHONE ENCOUNTER
Patient normally uses Accu-chek Rima, but the test strips (Accu-chek Smartview) strips are discontinued so she is requesting for a new machine to be prescribed, seeking advise on what she can get. She tests her blood glucose 2-3x a day.

## 2022-07-12 DIAGNOSIS — G72.9 MYOPATHY: ICD-10-CM

## 2022-07-12 DIAGNOSIS — D86.9 SARCOIDOSIS: Chronic | ICD-10-CM

## 2022-07-12 RX ORDER — INSULIN PUMP SYRINGE, 3 ML
EACH MISCELLANEOUS
Qty: 100 EACH | Refills: 5 | Status: SHIPPED | OUTPATIENT
Start: 2022-07-12 | End: 2022-10-30 | Stop reason: SDUPTHER

## 2022-07-12 NOTE — TELEPHONE ENCOUNTER
Refill Routing Note   Medication(s) are not appropriate for processing by [facility] for the following reason(s):      - Outside of protocol    [facility] action(s):  Quick Discontinue          Medication reconciliation completed: No     Appointments  past 12m or future 3m with PCP    Date Provider   Last Visit   7/6/2022 Micaela Mendoza MD   Next Visit   8/24/2022 Micaela Mendoza MD   ED visits in past 90 days: 0        Note composed:5:20 PM 07/12/2022

## 2022-07-12 NOTE — TELEPHONE ENCOUNTER
Care Due:                  Date            Visit Type   Department     Provider  --------------------------------------------------------------------------------                                             [facility]     MEDICINE/  Last Visit: 07-      FOLLOW UP    BENEDICT GUERRERO -         [facility]      MEDICINE/  Next Visit: 08-      CARE ([facility])   INTERNAL [facility]  Test          Frequency    Reason                     Performed    Due Date  --------------------------------------------------------------------------------    HBA1C.......  6 months...  insulin..................  04-   10-    [facility] Embedded Care Gaps. Reference number: [id number]. 7/12/2022   1:56:03 PM CDT

## 2022-07-13 ENCOUNTER — TELEPHONE (OUTPATIENT)
Dept: FAMILY MEDICINE | Facility: CLINIC | Age: 77
End: 2022-07-13
Payer: MEDICARE

## 2022-07-13 RX ORDER — FOLIC ACID 1 MG/1
TABLET ORAL
Qty: 90 TABLET | Refills: 1 | Status: SHIPPED | OUTPATIENT
Start: 2022-07-13 | End: 2022-09-28 | Stop reason: SDUPTHER

## 2022-07-14 NOTE — TELEPHONE ENCOUNTER
Called nurse Violeta nuno , asked if she retook patients bp since earlier this morning . Says she waited 30 minutes after patients  hydrALAZINE was in her system . BP was around  178/80 , asked for exact reading . Patient said she'd lay down to get her pain level down .     Called patient to check if her pain went down any since , said yes . Also she took her bp again with home cuff . It went down 142/81 with her cuff . Rating of pain  is now 6-7 if still . Says she feels slightly better

## 2022-07-14 NOTE — TELEPHONE ENCOUNTER
Spoke with Violeta from home health and she stated that patient's BP was checked by PT and it was 188/92, which she double checked and confirmed- Patient  took hydralazine this am and took an additional one for PRN order sys >160s. Violeta states that she will recheck blood pressure again. She is complaining of pain to her left hip down to foot - rating 8 from walking around. No chest pain, sob, lungs clear.

## 2022-07-14 NOTE — TELEPHONE ENCOUNTER
----- Message from Minerva Bassett sent at 7/14/2022 10:59 AM CDT -----  Type: Patient Call Back    Who called: home health     What is the request in detail: Pt was seen today her BP was 188/92, and she has some hip pain. Pt therapy went to the home but cannot work with her b/c of the elevated BP    Can the clinic reply by [facility]?    Would the patient rather a call back or a response via [facility]?     Best call back number: [phone], Violeta

## 2022-07-14 NOTE — TELEPHONE ENCOUNTER
----- Message from Minerva Bassett sent at 7/14/2022 10:59 AM CDT -----  Type: Patient Call Back    Who called: home health     What is the request in detail: Pt was seen today her BP was 188/92, and she has some hip pain. Pt therapy went to the home but cannot work with her b/c of the elevated BP    Can the clinic reply by [facility]?    Would the patient rather a call back or a response via [facility]?     Best call back number: [phone], Violeta

## 2022-07-15 ENCOUNTER — TELEPHONE (OUTPATIENT)
Dept: FAMILY MEDICINE | Facility: CLINIC | Age: 77
End: 2022-07-15
Payer: MEDICARE

## 2022-07-15 ENCOUNTER — OUTPATIENT CASE MANAGEMENT (OUTPATIENT)
Dept: ADMINISTRATIVE | Facility: OTHER | Age: 77
End: 2022-07-15
Payer: MEDICARE

## 2022-07-15 ENCOUNTER — EXTERNAL HOME HEALTH (OUTPATIENT)
Dept: HOME HEALTH SERVICES | Facility: HOSPITAL | Age: 77
End: 2022-07-15
Payer: MEDICARE

## 2022-07-15 NOTE — TELEPHONE ENCOUNTER
----- Message from Violeta Diez sent at 7/14/2022 11:29 AM CDT -----  Type:  Patient Returning Call    Who Called: Violeta Jeff [facility]     Who Left Message for Patient: Darnell     Does the patient know what this is regarding?: yes     Would the patient rather a call back or a response via [facility]? Call back     Best Call Back Number: [phone]

## 2022-07-18 ENCOUNTER — PATIENT MESSAGE (OUTPATIENT)
Dept: FAMILY MEDICINE | Facility: CLINIC | Age: 77
End: 2022-07-18
Payer: MEDICARE

## 2022-07-18 NOTE — PROGRESS NOTES
Outpatient Care Management  Plan of Care Follow Up Visit    Patient: Regino Lawrence  MRN: [id number]  Date of Service: 07/15/2022  Completed by: Mirna Leos RN  Referral Date: 06/14/2022  Program:     Reason for Visit   Patient presents with    OPCM Chart Review     7/15/2022    Update Plan Of Care     7/15/2022       Brief Summary:   7/15/2022  MEMBER'S CURRENT STATUS  :  S/p Post Hosp PCP appt 6/23--New steroid inhaler prescribed (fluticason-propion-salmeterol) which Regino says is helping with her breathing. No audible SOB or struggle to talk and breath this encounter. S/p Eye appt, has new prescription glasses. BGs are fluctuating up/down. Has not taken any insulin. BG this OI=341. [facility] continue pt services. Pt aware of upcoming PULM appt 8/4. She reports being dxd with sarcoidosis since 1981. Pt reports never being a big eater, maintains a little appetite. Dgt is giving her Protein Drink- no sugar Gatorade 10 Grams Pro drink and pt likes it. Eats a lot of fruits. Doesn't like Ensure products. Metformin reported d/c d/t the damage it can cause to kidneys. Noted PAP reached out to pt--letter mailed 6/20.  Pending PULM appt 8/4.     F/u in 2 wks to continue updates to Care Plans- Sarcoidoisis    Patient Summary     Involvement of Care:  Do I have permission to speak with other family members about your care?       Patient Reported Labs & Vitals:  1.  Any Patient Reported Labs & Vitals?     2.  Patient Reported Blood Pressure:     3.  Patient Reported Pulse:     4.  Patient Reported Weight (Kg):     5.  Patient Reported Blood Glucose (mg/dl):       Medical and social history was reviewed with patient and/or caregiver.     Clinical Assessment     Reviewed and provided basic information on available community resources for mental health, transportation, wellness resources, and palliative care programs with patient and/or caregiver.     Complex Care Plan     Care plan was discussed and completed today with  input from patient and/or caregiver.    Patient Instructions     Instructions were provided via the [employer] patient resources and are available for the patient to view on the patient portal.      No follow-ups on file.    Todays OPCM Self-Management Care Plan was developed with the patients/caregivers input and was based on identified barriers from todays assessment.  Goals were written today with the patient/caregiver and the patient has agreed to work towards these goals to improve his/her overall well-being. Patient verbalized understanding of the care plan, goals, and all of today's instructions. Encouraged patient/caregiver to communicate with his/her physician and health care team about health conditions and the treatment plan.  Provided my contact information today and encouraged patient/caregiver to call me with any questions as needed.

## 2022-07-19 RX ORDER — NIFEDIPINE 60 MG/1
60 TABLET, EXTENDED RELEASE ORAL DAILY
Qty: 30 TABLET | Refills: 5 | Status: SHIPPED | OUTPATIENT
Start: 2022-07-19 | End: 2022-09-12
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
## 2022-07-21 ENCOUNTER — TELEPHONE (OUTPATIENT)
Dept: FAMILY MEDICINE | Facility: CLINIC | Age: 77
End: 2022-07-21
Payer: MEDICARE

## 2022-07-21 NOTE — TELEPHONE ENCOUNTER
----- Message from Gabi Xiao LCSW sent at 7/21/2022  8:53 AM CDT -----  Good morning,    I am a  with [facility] Outpatient Care Management (OPCM) Department working with Mrs. Lawrence. She was referred to me by the OPCM RN for PHQ=18. I completed another PHQ and the score was 17. Pt denies SI/HI. She attributes many of her responses on the screening to her declining health and chronic pain. She is not taking Cymbalta and is not interested in counseling. She wants medication to alleviate the pain. Pt continues to be active in Christianity services 2x/week and feels her yung is a source of strength. I am assisting Mrs. Lawrence with resources for financial assistance.    Thank you for your time,  Gabi Xiao LCSW  [phone]

## 2022-07-22 ENCOUNTER — INFUSION (OUTPATIENT)
Dept: INFUSION THERAPY | Facility: HOSPITAL | Age: 77
End: 2022-07-22
Attending: INTERNAL MEDICINE
Payer: MEDICARE

## 2022-07-22 VITALS
OXYGEN SATURATION: 100 % | DIASTOLIC BLOOD PRESSURE: 71 MMHG | SYSTOLIC BLOOD PRESSURE: 149 MMHG | RESPIRATION RATE: 18 BRPM | TEMPERATURE: 98 F | HEART RATE: 96 BPM

## 2022-07-22 DIAGNOSIS — D63.1 ANEMIA IN CHRONIC KIDNEY DISEASE, UNSPECIFIED CKD STAGE: ICD-10-CM

## 2022-07-22 DIAGNOSIS — N18.30 ANEMIA IN STAGE 3 CHRONIC KIDNEY DISEASE, UNSPECIFIED WHETHER STAGE 3A OR 3B CKD: Primary | ICD-10-CM

## 2022-07-22 DIAGNOSIS — D50.9 IRON DEFICIENCY ANEMIA, UNSPECIFIED IRON DEFICIENCY ANEMIA TYPE: ICD-10-CM

## 2022-07-22 DIAGNOSIS — N18.9 ANEMIA IN CHRONIC KIDNEY DISEASE, UNSPECIFIED CKD STAGE: ICD-10-CM

## 2022-07-22 DIAGNOSIS — Z53.1 REFUSAL OF BLOOD TRANSFUSIONS AS PATIENT IS JEHOVAH'S WITNESS: ICD-10-CM

## 2022-07-22 DIAGNOSIS — D63.1 ANEMIA IN STAGE 3 CHRONIC KIDNEY DISEASE, UNSPECIFIED WHETHER STAGE 3A OR 3B CKD: Primary | ICD-10-CM

## 2022-07-22 PROCEDURE — 63600175 PHARM REV CODE 636 W HCPCS: Mod: JG,EC | Performed by: INTERNAL MEDICINE

## 2022-07-22 PROCEDURE — 96372 THER/PROPH/DIAG INJ SC/IM: CPT

## 2022-07-22 RX ADMIN — EPOETIN ALFA-EPBX 20000 UNITS: 20000 INJECTION, SOLUTION INTRAVENOUS; SUBCUTANEOUS at 10:07

## 2022-07-25 DIAGNOSIS — D86.9 SARCOIDOSIS: ICD-10-CM

## 2022-07-25 DIAGNOSIS — R56.9 SEIZURE: ICD-10-CM

## 2022-07-25 DIAGNOSIS — G72.9 MYOPATHY: ICD-10-CM

## 2022-07-25 RX ORDER — LEVETIRACETAM 250 MG/1
250 TABLET ORAL 2 TIMES DAILY
Qty: 60 TABLET | Refills: 0 | Status: SHIPPED | OUTPATIENT
Start: 2022-07-25 | End: 2022-09-08 | Stop reason: SDUPTHER

## 2022-07-25 RX ORDER — PREDNISONE 5 MG/1
5 TABLET ORAL DAILY
Qty: 90 TABLET | Refills: 0 | Status: SHIPPED | OUTPATIENT
Start: 2022-07-25 | End: 2022-09-30 | Stop reason: SDUPTHER

## 2022-07-25 NOTE — TELEPHONE ENCOUNTER
Thanks for reaching out Gabi    She really needs to take her cymbalta as we cannot prescribe narcotic pain medication for her and it is her last resort which I have discussed with her. When narcotics and other medicines like gabapentin where given she had too many falls and was hospitalized so this is unfortunately where we are    She also needs resources to help with her care I know she is very focused on her pain but we are going to have to guide her towards what we can do    Micaela Mendoza MD

## 2022-07-27 ENCOUNTER — OUTPATIENT CASE MANAGEMENT (OUTPATIENT)
Dept: ADMINISTRATIVE | Facility: OTHER | Age: 77
End: 2022-07-27
Payer: MEDICARE

## 2022-07-27 DIAGNOSIS — M48.02 CERVICAL SPINAL STENOSIS: ICD-10-CM

## 2022-07-27 NOTE — TELEPHONE ENCOUNTER
----- Message from Gabi Xiao LCSW sent at 7/27/2022 11:46 AM CDT -----  Good afternoon,    I spoke to Ms. Lawrence today about the importance of taking the Cymbalta, as prescribed by you, to help with her pain. Ms. Lawrence states she stopped taking it because she didn't feel it was working. She is willing to try it again. She asks if you'll send the prescription to [facility] pharmacy in [place]. Please contact pt to advise.    Thank you for your time,  Gabi Xiao LCSW    Outpatient Care Management (OPCM)

## 2022-07-27 NOTE — TELEPHONE ENCOUNTER
No new care gaps identified.  [facility] Embedded Care Gaps. Reference number: [id number]. 7/27/2022   3:55:08 PM CDT

## 2022-07-27 NOTE — PROGRESS NOTES
Outpatient Care Management   - Care Plan Follow Up    Patient: Regino Lawrence  MRN:  [id number]  Date of Service:  7/27/2022  Completed by:  Gabi Xiao LCSW  Referral Date: 06/14/2022  Program:     Reason for Visit   Patient presents with    OPCM Chart Review    Update Plan Of Care       Complex Care Plan     Care plan was discussed and completed today with input from patient and/or caregiver.    Patient Instructions     Follow up in about 12 days (around 8/8/2022) for call with .    [facility] OPCM Self-Management Care Plan was developed with the patients/caregivers input and was based on identified barriers from todays assessment.  Goals were written today with the patient/caregiver and the patient has agreed to work towards these goals to improve his/her overall well-being. Patient verbalized understanding of the care plan, goals, and all of today's instructions. Encouraged patient/caregiver to communicate with his/her physician and health care team about health conditions and the treatment plan.  Provided my contact information today and encouraged patient/caregiver to call me with any questions as needed.

## 2022-07-28 RX ORDER — DULOXETIN HYDROCHLORIDE 20 MG/1
20 CAPSULE, DELAYED RELEASE ORAL DAILY
Qty: 90 CAPSULE | Refills: 0 | Status: SHIPPED | OUTPATIENT
Start: 2022-07-28 | End: 2022-10-24 | Stop reason: SDUPTHER

## 2022-08-04 ENCOUNTER — OFFICE VISIT (OUTPATIENT)
Dept: PULMONOLOGY | Facility: CLINIC | Age: 77
End: 2022-08-04
Payer: MEDICARE

## 2022-08-04 VITALS
WEIGHT: 132.94 LBS | BODY MASS INDEX: 24.46 KG/M2 | HEIGHT: 62 IN | DIASTOLIC BLOOD PRESSURE: 76 MMHG | HEART RATE: 95 BPM | OXYGEN SATURATION: 96 % | SYSTOLIC BLOOD PRESSURE: 138 MMHG

## 2022-08-04 DIAGNOSIS — D86.9 SARCOIDOSIS: Primary | ICD-10-CM

## 2022-08-04 DIAGNOSIS — I26.94 MULTIPLE SUBSEGMENTAL PULMONARY EMBOLI WITHOUT ACUTE COR PULMONALE: ICD-10-CM

## 2022-08-04 DIAGNOSIS — I70.0 AORTIC ATHEROSCLEROSIS: ICD-10-CM

## 2022-08-04 DIAGNOSIS — R06.02 SHORTNESS OF BREATH: ICD-10-CM

## 2022-08-04 PROCEDURE — 99214 PR OFFICE/OUTPT VISIT, EST, LEVL IV, 30-39 MIN: ICD-10-PCS | Mod: S$GLB,,, | Performed by: STUDENT IN AN ORGANIZED HEALTH CARE EDUCATION/TRAINING PROGRAM

## 2022-08-04 PROCEDURE — 99999 PR PBB SHADOW E&M-EST. PATIENT-LVL III: CPT | Mod: PBBFAC,,, | Performed by: STUDENT IN AN ORGANIZED HEALTH CARE EDUCATION/TRAINING PROGRAM

## 2022-08-04 PROCEDURE — 3075F SYST BP GE 130 - 139MM HG: CPT | Mod: CPTII,S$GLB,, | Performed by: STUDENT IN AN ORGANIZED HEALTH CARE EDUCATION/TRAINING PROGRAM

## 2022-08-04 PROCEDURE — 1159F MED LIST DOCD IN RCRD: CPT | Mod: CPTII,S$GLB,, | Performed by: STUDENT IN AN ORGANIZED HEALTH CARE EDUCATION/TRAINING PROGRAM

## 2022-08-04 PROCEDURE — 3288F FALL RISK ASSESSMENT DOCD: CPT | Mod: CPTII,S$GLB,, | Performed by: STUDENT IN AN ORGANIZED HEALTH CARE EDUCATION/TRAINING PROGRAM

## 2022-08-04 PROCEDURE — 99499 RISK ADDL DX/OHS AUDIT: ICD-10-PCS | Mod: S$GLB,,, | Performed by: STUDENT IN AN ORGANIZED HEALTH CARE EDUCATION/TRAINING PROGRAM

## 2022-08-04 PROCEDURE — 99999 PR PBB SHADOW E&M-EST. PATIENT-LVL III: ICD-10-PCS | Mod: PBBFAC,,, | Performed by: STUDENT IN AN ORGANIZED HEALTH CARE EDUCATION/TRAINING PROGRAM

## 2022-08-04 PROCEDURE — 1101F PT FALLS ASSESS-DOCD LE1/YR: CPT | Mod: CPTII,S$GLB,, | Performed by: STUDENT IN AN ORGANIZED HEALTH CARE EDUCATION/TRAINING PROGRAM

## 2022-08-04 PROCEDURE — 1157F PR ADVANCE CARE PLAN OR EQUIV PRESENT IN MEDICAL RECORD: ICD-10-PCS | Mod: CPTII,S$GLB,, | Performed by: STUDENT IN AN ORGANIZED HEALTH CARE EDUCATION/TRAINING PROGRAM

## 2022-08-04 PROCEDURE — 99499 UNLISTED E&M SERVICE: CPT | Mod: S$GLB,,, | Performed by: STUDENT IN AN ORGANIZED HEALTH CARE EDUCATION/TRAINING PROGRAM

## 2022-08-04 PROCEDURE — 3288F PR FALLS RISK ASSESSMENT DOCUMENTED: ICD-10-PCS | Mod: CPTII,S$GLB,, | Performed by: STUDENT IN AN ORGANIZED HEALTH CARE EDUCATION/TRAINING PROGRAM

## 2022-08-04 PROCEDURE — 1125F PR PAIN SEVERITY QUANTIFIED, PAIN PRESENT: ICD-10-PCS | Mod: CPTII,S$GLB,, | Performed by: STUDENT IN AN ORGANIZED HEALTH CARE EDUCATION/TRAINING PROGRAM

## 2022-08-04 PROCEDURE — 1125F AMNT PAIN NOTED PAIN PRSNT: CPT | Mod: CPTII,S$GLB,, | Performed by: STUDENT IN AN ORGANIZED HEALTH CARE EDUCATION/TRAINING PROGRAM

## 2022-08-04 PROCEDURE — 1159F PR MEDICATION LIST DOCUMENTED IN MEDICAL RECORD: ICD-10-PCS | Mod: CPTII,S$GLB,, | Performed by: STUDENT IN AN ORGANIZED HEALTH CARE EDUCATION/TRAINING PROGRAM

## 2022-08-04 PROCEDURE — 3078F DIAST BP <80 MM HG: CPT | Mod: CPTII,S$GLB,, | Performed by: STUDENT IN AN ORGANIZED HEALTH CARE EDUCATION/TRAINING PROGRAM

## 2022-08-04 PROCEDURE — 99214 OFFICE O/P EST MOD 30 MIN: CPT | Mod: S$GLB,,, | Performed by: STUDENT IN AN ORGANIZED HEALTH CARE EDUCATION/TRAINING PROGRAM

## 2022-08-04 PROCEDURE — 3078F PR MOST RECENT DIASTOLIC BLOOD PRESSURE < 80 MM HG: ICD-10-PCS | Mod: CPTII,S$GLB,, | Performed by: STUDENT IN AN ORGANIZED HEALTH CARE EDUCATION/TRAINING PROGRAM

## 2022-08-04 PROCEDURE — 1101F PR PT FALLS ASSESS DOC 0-1 FALLS W/OUT INJ PAST YR: ICD-10-PCS | Mod: CPTII,S$GLB,, | Performed by: STUDENT IN AN ORGANIZED HEALTH CARE EDUCATION/TRAINING PROGRAM

## 2022-08-04 PROCEDURE — 3072F PR LOW RISK FOR RETINOPATHY: ICD-10-PCS | Mod: CPTII,S$GLB,, | Performed by: STUDENT IN AN ORGANIZED HEALTH CARE EDUCATION/TRAINING PROGRAM

## 2022-08-04 PROCEDURE — 1157F ADVNC CARE PLAN IN RCRD: CPT | Mod: CPTII,S$GLB,, | Performed by: STUDENT IN AN ORGANIZED HEALTH CARE EDUCATION/TRAINING PROGRAM

## 2022-08-04 PROCEDURE — 3072F LOW RISK FOR RETINOPATHY: CPT | Mod: CPTII,S$GLB,, | Performed by: STUDENT IN AN ORGANIZED HEALTH CARE EDUCATION/TRAINING PROGRAM

## 2022-08-04 PROCEDURE — 3075F PR MOST RECENT SYSTOLIC BLOOD PRESS GE 130-139MM HG: ICD-10-PCS | Mod: CPTII,S$GLB,, | Performed by: STUDENT IN AN ORGANIZED HEALTH CARE EDUCATION/TRAINING PROGRAM

## 2022-08-04 NOTE — PROGRESS NOTES
"Subjective:     Reason for visit:  Follow-up of pulmonary sarcoidosis    Patient ID:  Regino Lawrence is a 76 y.o. female with CKD on EPO, DM 2, chronic pain and fatigue, seizure disorder, history of PE (2021) and sarcoidosis with associated myopathy an arthropathy    Diagnosed with sarcoid at [facility] in [facility]. Previously on Plaquenil and methotrexate but both discontinued due to adverse effects.  Leflunomide was also attempted but held due to elevated blood pressure. Currently on prednisone 5 mg daily    Patient not new to this clinic but new to me.  Last seen Bijal Morrison 11/21/2019    Interval History:  Acute onset of shortness of breath, chest pain and weakness in June of this year.  Sent to the ED from Heme-Onc clinic.  Admitted to the hospital under observation for 24 hours and discharged on room air.  No sick contacts, cough or sputum production at that time.  Prior to that event, patient had no respiratory symptoms or limitation from pulmonary standpoint.  She denies ever having pulmonary issues as a result of her sarcoidosis.  Since that time she has been started on Advair which she reports significantly helped her breathing.  All symptoms have since resolved.  No fevers, chills or ongoing respiratory complaints.      Additional Pulmonary History:  Childhood Illnesses:  None  Occupational Exposures:  Retired, worked as   Environmental Exposures:  None  Tobacco/Smoking History:  Never  Travel History:  No out of [place] travel    Review of Systems   Constitutional: Positive for malaise/fatigue ( chronic). Negative for chills, fever and weight loss.   HENT: Positive for congestion (Chronic, seasonal.  Typically summer months). Negative for sinus pain.    Respiratory: Positive for cough ( "once in a blue moon). Negative for sputum production, shortness of breath and wheezing.    Cardiovascular: Positive for leg swelling. Negative for chest pain, palpitations and orthopnea.   Gastrointestinal: Negative " "for abdominal pain, heartburn, nausea and vomiting.        Bloating   Musculoskeletal: Positive for back pain ( chronic).   Neurological: Positive for weakness ( generalized). Negative for dizziness.   Endo/Heme/Allergies: Negative for environmental allergies.        Objective:     Vitals:    08/04/22 1003   BP: 138/76   BP Location: Left arm   Patient Position: Sitting   Pulse: 95   SpO2: 96%   Weight: 60.3 kg (132 lb 15 oz)   Height: 5' 2" (1.575 m)         Physical Exam  Vitals and nursing note reviewed.   Constitutional:       General: She is not in acute distress.     Appearance: She is not ill-appearing, toxic-appearing or diaphoretic.      Comments: Sitting and powered mobility scooter   HENT:      Head: Normocephalic and atraumatic.      Nose: No rhinorrhea.      Mouth/Throat:      Mouth: Mucous membranes are moist.      Pharynx: Oropharynx is clear. No oropharyngeal exudate or posterior oropharyngeal erythema.   Eyes:      General: No scleral icterus.     Extraocular Movements: Extraocular movements intact.   Cardiovascular:      Rate and Rhythm: Regular rhythm. Tachycardia present.      Heart sounds: No murmur heard.  Pulmonary:      Effort: No tachypnea, accessory muscle usage, respiratory distress or retractions.      Breath sounds: No decreased breath sounds, wheezing, rhonchi or rales.   Abdominal:      General: There is distension (Mildly).      Palpations: Abdomen is soft.      Tenderness: There is no abdominal tenderness. There is no guarding.   Musculoskeletal:      Right lower leg: Edema present.      Left lower leg: Edema present.   Skin:     General: Skin is warm and dry.      Coloration: Skin is not jaundiced.      Findings: No rash.   Neurological:      General: No focal deficit present.      Mental Status: She is oriented to person, place, and time. Mental status is at baseline.      Cranial Nerves: No cranial nerve deficit.          Personal Diagnostic Review and Interpretation  06/13/2022 " CTA chest:  Aortic calcifications.  Elevated right hemidiaphragm with RLL calcified granuloma.  No mediastinal or hilar lymphadenopathy.  No parenchymal disease.    07/09/2021 CTA chest:  No lymphadenopathy.  No obvious airspace disease or parenchymal abnormalities      Pertinent Studies Reviewed and Interpreted:     Pulmonary Function Tests:   11/26/2019 PFT:  FEV1 100%, %.  TLC 73%.  DLCO 73%    6 Minute Walk Tests:   None    Echocardiograms:   07/11/2021 echo:  LVEF 60% with concentric LVH, LAE.  G1 DD and PASP 27    06/13/2022 BNP 30  02/23/2021     Assessment:     1. Sarcoidosis  Ambulatory referral/consult to Pulmonology    Spirometry with/without bronchodilator    Lung Volumes    DLCO-Carbon Monoxide Diffusing Capacity    Stress test, pulmonary    Echo Saline Bubble? Yes   2. Shortness of breath  Echo Saline Bubble? Yes   3. Aortic atherosclerosis     4. Multiple subsegmental pulmonary emboli without acute cor pulmonale         Current Outpatient Medications   Medication Instructions    ACCU-CHEK FASTCLIX LANCING DEV Kit USE AS DIRECTED.    ALCOHOL ANTISEPTIC PADS (ALCOHOL PREP PADS TOP) No dose, route, or frequency recorded.    apixaban (ELIQUIS) 5 mg, Oral, 2 times daily, Start this prescription after finishing starter pack    atorvastatin (LIPITOR) 20 MG tablet Take 1 tablet by mouth once daily    blood sugar diagnostic (ACCU-CHEK SMARTVIEW TEST STRIP) Strp Use as directed to check blood sugar twice daily.    blood sugar diagnostic Strp To check BG three times daily, to use with insurance preferred meter    blood-glucose meter kit Use as instructed    blood-glucose meter kit To check BG three times daily, to use with insurance preferred meter    brivaracetam (BRIVIACT) 75 mg, Oral, 2 times daily    calcium carbonate (OS-MALCOLM) 600 mg calcium (1,500 mg) Tab 1 tablet, Oral, 2 times daily    carvediloL (COREG) 25 mg, Oral, 2 times daily    DULoxetine (CYMBALTA) 20 mg, Oral, Daily     "epoetin german-epbx (RETACRIT) 20,000 Units, Subcutaneous, Every 14 days    fenofibrate 160 mg, Oral, Daily    fluticasone propion-salmeterol 115-21 mcg/dose (ADVAIR HFA) 115-21 mcg/actuation HFAA inhaler 2 puffs, Inhalation, Every 12 hours, Controller    folic acid (FOLVITE) 1 MG tablet Take 1 tablet by mouth once daily    hydrALAZINE (APRESOLINE) 25 MG tablet Take 2 tabs every 8 hours by mouth. Check BP 2 hours after each dose and if Blood pressure >160- please take 1 extra tab    insulin detemir U-100 (LEVEMIR FLEXTOUCH) 10 Units, Subcutaneous, Nightly    levETIRAcetam (KEPPRA) 250 mg, Oral, 2 times daily    levothyroxine (SYNTHROID) 50 mcg, Oral, Before breakfast    NIFEdipine (PROCARDIA-XL) 60 mg, Oral, Daily    olopatadine (PATANOL) 0.1 % ophthalmic solution 1 drop, Both Eyes, Daily PRN    pantoprazole (PROTONIX) 40 mg, Oral, Daily    pen needle, diabetic (NOVOFINE 32) 32 gauge x 1/4" Ndle For use with C3L3B Digitalmir pen .    potassium chloride SA (K-DUR,KLOR-CON) 20 MEQ tablet 20 mEq, Oral, 2 times daily    predniSONE (DELTASONE) 5 mg, Oral, Daily    tiZANidine (ZANAFLEX) 4 mg, Oral, Every 8 hours PRN      Mrs. Regino Lawrence had no medications administered during this visit.     Orders Placed This Encounter   Procedures    Echo Saline Bubble? Yes     Standing Status:   Future     Standing Expiration Date:   8/4/2023     Order Specific Question:   Limited Echo?     Answer:   No     Order Specific Question:   Saline Bubble?     Answer:   Yes     Order Specific Question:   Ultrasound enhancing contrast?     Answer:   Yes     Order Specific Question:   Adult congenital patient?     Answer:   No     Order Specific Question:   Oncology Patient?     Answer:   No     Order Specific Question:   Release to patient     Answer:   Immediate    Spirometry with/without bronchodilator     Standing Status:   Future     Standing Expiration Date:   8/4/2023     Order Specific Question:   Release to patient     " Answer:   Immediate    Lung Volumes     Standing Status:   Future     Standing Expiration Date:   8/4/2023     Order Specific Question:   Release to patient     Answer:   Immediate    DLCO-Carbon Monoxide Diffusing Capacity     Standing Status:   Future     Standing Expiration Date:   8/4/2023     Order Specific Question:   Release to patient     Answer:   Immediate    Stress test, pulmonary     Standing Status:   Future     Standing Expiration Date:   8/4/2023     Order Specific Question:   Reason for study     Answer:   Oxygen prescription     Order Specific Question:   Release to patient     Answer:   Immediate      Plan:       Problem List Items Addressed This Visit        Pulmonary    Shortness of breath    Overview     Acute onset of shortness of breath in June of this year.  CTA negative for parenchymal findings or PE.  Reportedly was attributed to sarcoid, however does not appear to have received high-dose steroids to treat such.  Unclear etiology but seems to have been an acute process given its complete resolution.  Reported significant improvement with administration of Advair, so stands to reason it may have been some lingering reactive airways disease.           Current Assessment & Plan     Will allow a sufficient amount of time to pass between event and follow-up PFTs which can be obtained prior to next visit.  Despite negative BNP, does have LE edema so we will repeat echo to rule out cardiac component, especially since there was associated chest pain and a history of diastolic dysfunction           Relevant Orders    Echo Saline Bubble? Yes       Cardiac/Vascular    Aortic atherosclerosis    Overview     Noted on 2021 CTA.  Currently on statin.           Current Assessment & Plan     Continue current regimen              Immunology/Multi System    Sarcoidosis - Primary (Chronic)    Overview     Diagnosed  at [facility] in 1981 with systemic manifestations of myopathy and arthropathy.  Previously on Plaquenil and methotrexate but both discontinued due to adverse effects.  Leflunomide was also attempted but held due to elevated blood pressure.  Follows with Rheumatology.  Currently on prednisone 5 mg daily.  No evidence of pulmonary involvement on serial CTs dating back for years, including LAD. 2019 PFT with mild restriction.           Current Assessment & Plan     Suspect mild restriction on PFTs is more related to chronic debility and asymmetrically elevated right hemidiaphragm.  Have a hard time believing respiratory complaints were related solely to sarcoidosis flare given lack of parenchymal findings on CTA chest.  Additionally, symptoms have resolved without significant pulse dose steroids.  See the section on shortness of breath           Relevant Orders    Spirometry with/without bronchodilator    Lung Volumes    DLCO-Carbon Monoxide Diffusing Capacity    Stress test, pulmonary    Echo Saline Bubble? Yes       Hematology    Multiple subsegmental pulmonary emboli without acute cor pulmonale    Overview     Seen on 2021 CTA chest.  RML and RLL.  Currently on Eliquis.  Reports strict adherence           Current Assessment & Plan     Appears unprovoked.  Continue apixaban indefinitely                  40 minutes of total time spent on the encounter, which includes face-to-face time and non-face-to-face time preparing to see the patient (eg, review of tests), obtaining and/or reviewing separately obtained history, documenting clinical information in the electronic or other health record, independently interpreting results (not separately reported), and communicating results to the patient/family/caregiver, or care coordination (not separately reported).     Portions of the record may have been created with voice-recognition software. Occasional wrong-word or sound-a-like substitutions may have occurred due to the inherent limitations of voice-recognition software. Read the chart carefully and  recognize, using context, where substitutions have occurred.

## 2022-08-04 NOTE — ASSESSMENT & PLAN NOTE
Will allow a sufficient amount of time to pass between event and follow-up PFTs which can be obtained prior to next visit.  Despite negative BNP, does have LE edema so we will repeat echo to rule out cardiac component, especially since there was associated chest pain and a history of diastolic dysfunction

## 2022-08-04 NOTE — ASSESSMENT & PLAN NOTE
Suspect mild restriction on PFTs is more related to chronic debility and asymmetrically elevated right hemidiaphragm.  Have a hard time believing respiratory complaints were related solely to sarcoidosis flare given lack of parenchymal findings on CTA chest.  Additionally, symptoms have resolved without significant pulse dose steroids.  See the section on shortness of breath

## 2022-08-05 ENCOUNTER — PATIENT OUTREACH (OUTPATIENT)
Dept: ADMINISTRATIVE | Facility: HOSPITAL | Age: 77
End: 2022-08-05
Payer: MEDICARE

## 2022-08-05 ENCOUNTER — INFUSION (OUTPATIENT)
Dept: INFUSION THERAPY | Facility: HOSPITAL | Age: 77
End: 2022-08-05
Attending: INTERNAL MEDICINE
Payer: MEDICARE

## 2022-08-05 VITALS
TEMPERATURE: 98 F | SYSTOLIC BLOOD PRESSURE: 158 MMHG | HEART RATE: 93 BPM | OXYGEN SATURATION: 95 % | RESPIRATION RATE: 16 BRPM | DIASTOLIC BLOOD PRESSURE: 76 MMHG

## 2022-08-05 DIAGNOSIS — N18.30 ANEMIA IN STAGE 3 CHRONIC KIDNEY DISEASE, UNSPECIFIED WHETHER STAGE 3A OR 3B CKD: Primary | ICD-10-CM

## 2022-08-05 DIAGNOSIS — N18.9 ANEMIA IN CHRONIC KIDNEY DISEASE, UNSPECIFIED CKD STAGE: ICD-10-CM

## 2022-08-05 DIAGNOSIS — D50.9 IRON DEFICIENCY ANEMIA, UNSPECIFIED IRON DEFICIENCY ANEMIA TYPE: ICD-10-CM

## 2022-08-05 DIAGNOSIS — D63.1 ANEMIA IN STAGE 3 CHRONIC KIDNEY DISEASE, UNSPECIFIED WHETHER STAGE 3A OR 3B CKD: Primary | ICD-10-CM

## 2022-08-05 DIAGNOSIS — D63.1 ANEMIA IN CHRONIC KIDNEY DISEASE, UNSPECIFIED CKD STAGE: ICD-10-CM

## 2022-08-05 DIAGNOSIS — Z53.1 REFUSAL OF BLOOD TRANSFUSIONS AS PATIENT IS JEHOVAH'S WITNESS: ICD-10-CM

## 2022-08-05 PROCEDURE — 63600175 PHARM REV CODE 636 W HCPCS: Mod: JG,EC | Performed by: INTERNAL MEDICINE

## 2022-08-05 PROCEDURE — 96372 THER/PROPH/DIAG INJ SC/IM: CPT

## 2022-08-05 RX ADMIN — EPOETIN ALFA-EPBX 20000 UNITS: 20000 INJECTION, SOLUTION INTRAVENOUS; SUBCUTANEOUS at 11:08

## 2022-08-05 NOTE — PLAN OF CARE
Patient arrived to unit for Retacrit 20,000u injection.No new or worsening symptoms to report today. Labs reviewed, HGB 10.4 today. Plan of care reviewed, patient agreeable to plan. Patient tolerated injection well. VSS. Discharge instructions reviewed, patient instructed to return 8/19. Patient left off unit in motorized scooter escorted by family member. Patient in NAD at time of discharge.

## 2022-08-05 NOTE — PROGRESS NOTES
Attempted to LVM for pt to call back and schedule a nurse BP visit, visit with PCP or give a home BP reading. No VM. Immunization's updated.

## 2022-08-08 ENCOUNTER — OUTPATIENT CASE MANAGEMENT (OUTPATIENT)
Dept: ADMINISTRATIVE | Facility: OTHER | Age: 77
End: 2022-08-08
Payer: MEDICARE

## 2022-08-08 NOTE — PROGRESS NOTES
Outpatient Care Management   - Care Plan Follow Up    Patient: Regino Lawrence  MRN:  [id number]  Date of Service:  8/9/2022  Completed by:  Gabi Xiao LCSW  Referral Date: 06/14/2022  Program:     Reason for Visit   Patient presents with    Update Plan Of Care     Complex Care Plan     Care plan was discussed and completed today with input from patient and/or caregiver.    Patient Instructions     Follow up in about 9 days (around 8/17/2022) for call with .    Todays OPCM Self-Management Care Plan was developed with the patients/caregivers input and was based on identified barriers from todays assessment.  Goals were written today with the patient/caregiver and the patient has agreed to work towards these goals to improve his/her overall well-being. Patient verbalized understanding of the care plan, goals, and all of today's instructions. Encouraged patient/caregiver to communicate with his/her physician and health care team about health conditions and the treatment plan.  Provided my contact information today and encouraged patient/caregiver to call me with any questions as needed.

## 2022-08-09 ENCOUNTER — OUTPATIENT CASE MANAGEMENT (OUTPATIENT)
Dept: ADMINISTRATIVE | Facility: OTHER | Age: 77
End: 2022-08-09
Payer: MEDICARE

## 2022-08-09 NOTE — PROGRESS NOTES
Outpatient Care Management  Plan of Care Follow Up Visit    Patient: Regino Lawrence  MRN: [id number]  Date of Service: 08/09/2022  Completed by: Mirna Leos RN  Referral Date: 06/14/2022  Program: Outpatient Care Management High Risk    Reason for Visit   Patient presents with    OPCM Chart Review     8/9/2022    Update Plan Of Care     8/9/2022       Brief Summary:     Brief Summary:   S/p 8/4 PULM- To stay on Advair inhaler BID x 3 months then take off to reassess lungs. Has Echo 8/15 to check on heart. She has never seen a Cardio. Not coming from the Sarcoid PULM reported. Sarcoid would show up with lesions in lung but pt has no lung lesions. Regino is AAOx4, speaks without SOB since using Advair Inhaler. Exhibits positive approach in her discussion of her medical conditions and her providers plan of care.  She is adhering to her prescribed meds, medical appts/labs. Reinforced the need to give the Cymbalta more time- several weeks to determine its effects on her pain  Continue to provide education on Sarcoidosis disease process, s/s complications, lifestyle changes to reduce potential complications.   F/u in 2 wks following PCP appt 8/24 and Echo 8/15.    Patient Summary     Involvement of Care:  Do I have permission to speak with other family members about your care?       Patient Reported Labs & Vitals:  1.  Any Patient Reported Labs & Vitals?     2.  Patient Reported Blood Pressure:     3.  Patient Reported Pulse:     4.  Patient Reported Weight (Kg):     5.  Patient Reported Blood Glucose (mg/dl):       Medical and social history was reviewed with patient and/or caregiver.     Clinical Assessment     Reviewed and provided basic information on available community resources for mental health, transportation, wellness resources, and palliative care programs with patient and/or caregiver.     Complex Care Plan     Care plan was discussed and completed today with input from patient and/or  caregiver.    Patient Instructions     Instructions were provided via the [employer] patient resources and are available for the patient to view on the patient portal.        Follow up in about 16 days (around 8/25/2022) for OPCM RN Follow Up to update care plan.    Todays OPCM Self-Management Care Plan was developed with the patients/caregivers input and was based on identified barriers from todays assessment.  Goals were written today with the patient/caregiver and the patient has agreed to work towards these goals to improve his/her overall well-being. Patient verbalized understanding of the care plan, goals, and all of today's instructions. Encouraged patient/caregiver to communicate with his/her physician and health care team about health conditions and the treatment plan.  Provided my contact information today and encouraged patient/caregiver to call me with any questions as needed.

## 2022-08-15 ENCOUNTER — HOSPITAL ENCOUNTER (OUTPATIENT)
Dept: CARDIOLOGY | Facility: HOSPITAL | Age: 77
Discharge: HOME | End: 2022-08-15
Attending: STUDENT IN AN ORGANIZED HEALTH CARE EDUCATION/TRAINING PROGRAM
Payer: MEDICARE

## 2022-08-15 VITALS
BODY MASS INDEX: 22.53 KG/M2 | DIASTOLIC BLOOD PRESSURE: 80 MMHG | HEIGHT: 64 IN | HEART RATE: 100 BPM | WEIGHT: 132 LBS | SYSTOLIC BLOOD PRESSURE: 160 MMHG

## 2022-08-15 DIAGNOSIS — D86.9 SARCOIDOSIS: ICD-10-CM

## 2022-08-15 DIAGNOSIS — R06.02 SHORTNESS OF BREATH: ICD-10-CM

## 2022-08-15 LAB
ASCENDING AORTA: 2.8 CM
AV INDEX (PROSTH): 0.73
AV MEAN GRADIENT: 6 MMHG
AV PEAK GRADIENT: 11 MMHG
AV VALVE AREA: 2.26 CM2
AV VELOCITY RATIO: 0.62
BSA FOR ECHO PROCEDURE: 1.64 M2
CV ECHO LV RWT: 0.45 CM
DOP CALC AO PEAK VEL: 1.64 M/S
DOP CALC AO VTI: 31.52 CM
DOP CALC LVOT AREA: 3.1 CM2
DOP CALC LVOT DIAMETER: 1.99 CM
DOP CALC LVOT PEAK VEL: 1.02 M/S
DOP CALC LVOT STROKE VOLUME: 71.19 CM3
DOP CALCLVOT PEAK VEL VTI: 22.9 CM
E WAVE DECELERATION TIME: 102.13 MSEC
E/A RATIO: 0.7
E/E' RATIO: 12.31 M/S
ECHO LV POSTERIOR WALL: 0.85 CM (ref 0.6–1.1)
EJECTION FRACTION: 68 %
FRACTIONAL SHORTENING: 30 % (ref 28–44)
INTERVENTRICULAR SEPTUM: 0.86 CM (ref 0.6–1.1)
LA MAJOR: 4.76 CM
LA MINOR: 4.99 CM
LA WIDTH: 2.87 CM
LEFT ATRIUM SIZE: 3.49 CM
LEFT ATRIUM VOLUME INDEX MOD: 20.3 ML/M2
LEFT ATRIUM VOLUME INDEX: 25.3 ML/M2
LEFT ATRIUM VOLUME MOD: 33.24 CM3
LEFT ATRIUM VOLUME: 41.48 CM3
LEFT INTERNAL DIMENSION IN SYSTOLE: 2.66 CM (ref 2.1–4)
LEFT VENTRICLE DIASTOLIC VOLUME INDEX: 38.08 ML/M2
LEFT VENTRICLE DIASTOLIC VOLUME: 62.45 ML
LEFT VENTRICLE MASS INDEX: 58 G/M2
LEFT VENTRICLE SYSTOLIC VOLUME INDEX: 15.8 ML/M2
LEFT VENTRICLE SYSTOLIC VOLUME: 25.92 ML
LEFT VENTRICULAR INTERNAL DIMENSION IN DIASTOLE: 3.81 CM (ref 3.5–6)
LEFT VENTRICULAR MASS: 94.52 G
LV LATERAL E/E' RATIO: 11.43 M/S
LV SEPTAL E/E' RATIO: 13.33 M/S
MV A" WAVE DURATION": 9.13 MSEC
MV PEAK A VEL: 1.15 M/S
MV PEAK E VEL: 0.8 M/S
MV STENOSIS PRESSURE HALF TIME: 29.62 MS
MV VALVE AREA P 1/2 METHOD: 7.43 CM2
PISA TR MAX VEL: 3.24 M/S
PULM VEIN S/D RATIO: 0.81
PV PEAK D VEL: 0.73 M/S
PV PEAK S VEL: 0.59 M/S
RA MAJOR: 4.62 CM
RA PRESSURE: 3 MMHG
RA WIDTH: 2.72 CM
RIGHT VENTRICULAR END-DIASTOLIC DIMENSION: 2.66 CM
RV TISSUE DOPPLER FREE WALL SYSTOLIC VELOCITY 1 (APICAL 4 CHAMBER VIEW): 10.1 CM/S
SINUS: 2.98 CM
STJ: 2.19 CM
TDI LATERAL: 0.07 M/S
TDI SEPTAL: 0.06 M/S
TDI: 0.07 M/S
TR MAX PG: 42 MMHG
TRICUSPID ANNULAR PLANE SYSTOLIC EXCURSION: 2.14 CM
TV REST PULMONARY ARTERY PRESSURE: 45 MMHG

## 2022-08-15 PROCEDURE — 93306 ECHO (CUPID ONLY): ICD-10-PCS | Mod: 26,,, | Performed by: INTERNAL MEDICINE

## 2022-08-15 PROCEDURE — 93306 TTE W/DOPPLER COMPLETE: CPT | Mod: 26,,, | Performed by: INTERNAL MEDICINE

## 2022-08-15 PROCEDURE — 93306 TTE W/DOPPLER COMPLETE: CPT

## 2022-08-15 NOTE — PROGRESS NOTES
Procedure explained. 22 g sl started in right forearm for bubble study. Bubble study done x 2 with and without valsalva. Tolerated well. Sl d/jackie after. Pressure applied.

## 2022-08-17 ENCOUNTER — OUTPATIENT CASE MANAGEMENT (OUTPATIENT)
Dept: ADMINISTRATIVE | Facility: OTHER | Age: 77
End: 2022-08-17
Payer: MEDICARE

## 2022-08-17 NOTE — PROGRESS NOTES
Outpatient Care Management   - Care Plan Follow Up    Patient: Regino Lawrence  MRN:  [id number]  Date of Service:  8/17/2022  Completed by:  Gabi Xiao LCSW  Referral Date: 06/14/2022    Reason for Visit   Patient presents with    Update Plan Of Care    Case Closure       Brief Summary: Per daughter Maynor, application and necessary documents for medication assistance were dropped off to [facility], [facility]. No other needs reported.     Complex Care Plan     Care plan was discussed and completed today with input from patient and/or caregiver.    Patient Instructions     No follow-ups on file.    Todays OPCM Self-Management Care Plan was developed with the patients/caregivers input and was based on identified barriers from todays assessment.  Goals were written today with the patient/caregiver and the patient has agreed to work towards these goals to improve his/her overall well-being. Patient verbalized understanding of the care plan, goals, and all of today's instructions. Encouraged patient/caregiver to communicate with his/her physician and health care team about health conditions and the treatment plan.  Provided my contact information today and encouraged patient/caregiver to call me with any questions as needed.

## 2022-08-19 ENCOUNTER — INFUSION (OUTPATIENT)
Dept: INFUSION THERAPY | Facility: HOSPITAL | Age: 77
End: 2022-08-19
Attending: INTERNAL MEDICINE
Payer: MEDICARE

## 2022-08-19 VITALS
OXYGEN SATURATION: 95 % | RESPIRATION RATE: 17 BRPM | DIASTOLIC BLOOD PRESSURE: 80 MMHG | HEART RATE: 95 BPM | SYSTOLIC BLOOD PRESSURE: 162 MMHG | TEMPERATURE: 98 F

## 2022-08-19 DIAGNOSIS — N18.30 ANEMIA IN STAGE 3 CHRONIC KIDNEY DISEASE, UNSPECIFIED WHETHER STAGE 3A OR 3B CKD: Primary | ICD-10-CM

## 2022-08-19 DIAGNOSIS — D50.9 IRON DEFICIENCY ANEMIA, UNSPECIFIED IRON DEFICIENCY ANEMIA TYPE: ICD-10-CM

## 2022-08-19 DIAGNOSIS — D63.1 ANEMIA IN CHRONIC KIDNEY DISEASE, UNSPECIFIED CKD STAGE: ICD-10-CM

## 2022-08-19 DIAGNOSIS — Z53.1 REFUSAL OF BLOOD TRANSFUSIONS AS PATIENT IS JEHOVAH'S WITNESS: ICD-10-CM

## 2022-08-19 DIAGNOSIS — D63.1 ANEMIA IN STAGE 3 CHRONIC KIDNEY DISEASE, UNSPECIFIED WHETHER STAGE 3A OR 3B CKD: Primary | ICD-10-CM

## 2022-08-19 DIAGNOSIS — N18.9 ANEMIA IN CHRONIC KIDNEY DISEASE, UNSPECIFIED CKD STAGE: ICD-10-CM

## 2022-08-19 PROCEDURE — 63600175 PHARM REV CODE 636 W HCPCS: Mod: JG | Performed by: INTERNAL MEDICINE

## 2022-08-19 PROCEDURE — 96372 THER/PROPH/DIAG INJ SC/IM: CPT

## 2022-08-19 RX ADMIN — EPOETIN ALFA-EPBX 20000 UNITS: 20000 INJECTION, SOLUTION INTRAVENOUS; SUBCUTANEOUS at 11:08

## 2022-08-19 NOTE — PLAN OF CARE
Pt arrived to unit. No new issues. Tolerated injection. Pt has next appts. Pt discharged from unit in motorized scooter. No distress noted.

## 2022-08-24 ENCOUNTER — OFFICE VISIT (OUTPATIENT)
Dept: FAMILY MEDICINE | Facility: CLINIC | Age: 77
End: 2022-08-24
Payer: MEDICARE

## 2022-08-24 VITALS
TEMPERATURE: 99 F | WEIGHT: 130.75 LBS | DIASTOLIC BLOOD PRESSURE: 82 MMHG | HEIGHT: 64 IN | OXYGEN SATURATION: 97 % | SYSTOLIC BLOOD PRESSURE: 160 MMHG | BODY MASS INDEX: 22.32 KG/M2 | RESPIRATION RATE: 16 BRPM | HEART RATE: 67 BPM

## 2022-08-24 DIAGNOSIS — D86.9 SARCOIDOSIS: Chronic | ICD-10-CM

## 2022-08-24 DIAGNOSIS — R06.02 SHORTNESS OF BREATH: ICD-10-CM

## 2022-08-24 DIAGNOSIS — G89.4 CHRONIC PAIN SYNDROME: Primary | ICD-10-CM

## 2022-08-24 DIAGNOSIS — R14.0 BLOATING: ICD-10-CM

## 2022-08-24 PROCEDURE — 99999 PR PBB SHADOW E&M-EST. PATIENT-LVL III: CPT | Mod: PBBFAC,,, | Performed by: FAMILY MEDICINE

## 2022-08-24 PROCEDURE — 99214 PR OFFICE/OUTPT VISIT, EST, LEVL IV, 30-39 MIN: ICD-10-PCS | Mod: 25,S$GLB,, | Performed by: FAMILY MEDICINE

## 2022-08-24 PROCEDURE — 3077F SYST BP >= 140 MM HG: CPT | Mod: CPTII,S$GLB,, | Performed by: FAMILY MEDICINE

## 2022-08-24 PROCEDURE — 3079F PR MOST RECENT DIASTOLIC BLOOD PRESSURE 80-89 MM HG: ICD-10-PCS | Mod: CPTII,S$GLB,, | Performed by: FAMILY MEDICINE

## 2022-08-24 PROCEDURE — 99214 OFFICE O/P EST MOD 30 MIN: CPT | Mod: 25,S$GLB,, | Performed by: FAMILY MEDICINE

## 2022-08-24 PROCEDURE — 96372 THER/PROPH/DIAG INJ SC/IM: CPT | Mod: S$GLB,,, | Performed by: FAMILY MEDICINE

## 2022-08-24 PROCEDURE — 1125F AMNT PAIN NOTED PAIN PRSNT: CPT | Mod: CPTII,S$GLB,, | Performed by: FAMILY MEDICINE

## 2022-08-24 PROCEDURE — 3077F PR MOST RECENT SYSTOLIC BLOOD PRESSURE >= 140 MM HG: ICD-10-PCS | Mod: CPTII,S$GLB,, | Performed by: FAMILY MEDICINE

## 2022-08-24 PROCEDURE — 1101F PT FALLS ASSESS-DOCD LE1/YR: CPT | Mod: CPTII,S$GLB,, | Performed by: FAMILY MEDICINE

## 2022-08-24 PROCEDURE — 3288F FALL RISK ASSESSMENT DOCD: CPT | Mod: CPTII,S$GLB,, | Performed by: FAMILY MEDICINE

## 2022-08-24 PROCEDURE — 1159F MED LIST DOCD IN RCRD: CPT | Mod: CPTII,S$GLB,, | Performed by: FAMILY MEDICINE

## 2022-08-24 PROCEDURE — 1159F PR MEDICATION LIST DOCUMENTED IN MEDICAL RECORD: ICD-10-PCS | Mod: CPTII,S$GLB,, | Performed by: FAMILY MEDICINE

## 2022-08-24 PROCEDURE — 99999 PR PBB SHADOW E&M-EST. PATIENT-LVL III: ICD-10-PCS | Mod: PBBFAC,,, | Performed by: FAMILY MEDICINE

## 2022-08-24 PROCEDURE — 3288F PR FALLS RISK ASSESSMENT DOCUMENTED: ICD-10-PCS | Mod: CPTII,S$GLB,, | Performed by: FAMILY MEDICINE

## 2022-08-24 PROCEDURE — 96372 PR INJECTION,THERAP/PROPH/DIAG2ST, IM OR SUBCUT: ICD-10-PCS | Mod: S$GLB,,, | Performed by: FAMILY MEDICINE

## 2022-08-24 PROCEDURE — 3072F LOW RISK FOR RETINOPATHY: CPT | Mod: CPTII,S$GLB,, | Performed by: FAMILY MEDICINE

## 2022-08-24 PROCEDURE — 3072F PR LOW RISK FOR RETINOPATHY: ICD-10-PCS | Mod: CPTII,S$GLB,, | Performed by: FAMILY MEDICINE

## 2022-08-24 PROCEDURE — 1157F ADVNC CARE PLAN IN RCRD: CPT | Mod: CPTII,S$GLB,, | Performed by: FAMILY MEDICINE

## 2022-08-24 PROCEDURE — 1157F PR ADVANCE CARE PLAN OR EQUIV PRESENT IN MEDICAL RECORD: ICD-10-PCS | Mod: CPTII,S$GLB,, | Performed by: FAMILY MEDICINE

## 2022-08-24 PROCEDURE — 1125F PR PAIN SEVERITY QUANTIFIED, PAIN PRESENT: ICD-10-PCS | Mod: CPTII,S$GLB,, | Performed by: FAMILY MEDICINE

## 2022-08-24 PROCEDURE — 3079F DIAST BP 80-89 MM HG: CPT | Mod: CPTII,S$GLB,, | Performed by: FAMILY MEDICINE

## 2022-08-24 PROCEDURE — 1101F PR PT FALLS ASSESS DOC 0-1 FALLS W/OUT INJ PAST YR: ICD-10-PCS | Mod: CPTII,S$GLB,, | Performed by: FAMILY MEDICINE

## 2022-08-24 RX ORDER — TRIAMCINOLONE ACETONIDE 40 MG/ML
80 INJECTION, SUSPENSION INTRA-ARTICULAR; INTRAMUSCULAR
Status: COMPLETED | OUTPATIENT
Start: 2022-08-24 | End: 2022-08-24

## 2022-08-24 RX ADMIN — TRIAMCINOLONE ACETONIDE 80 MG: 40 INJECTION, SUSPENSION INTRA-ARTICULAR; INTRAMUSCULAR at 02:08

## 2022-08-24 NOTE — PROGRESS NOTES
Patient given Triamcinolone Acetonide 80 mg to left ventrogluteal, no complaints or reactions noted. Instructed to contact office with any questions or concerns.

## 2022-08-24 NOTE — PROGRESS NOTES
"Chief Complaint   Patient presents with    Chronic Pain       HPI    Regino Lawrence is a 76 y.o. female  presenting for follow-up for for chronic pain    Chronic pain  She has pain in her hips, lower back and the pain is radiating down her leg  She has f/u with Dr. Houser but it is in November  She has been taking cymbalta with some improvement in pain symptoms  She would like to refill a topical pain cream      Shortness of breath/sarcoid  She is undergoing workup with pulm  She may have underlying reactive airway disease  Symptoms have improved with inhaler    ROS*  Review of Systems   Constitutional: Negative for chills, diaphoresis, fatigue, fever and unexpected weight change.   HENT: Negative for rhinorrhea, sinus pressure, sore throat and tinnitus.    Eyes: Negative for photophobia and visual disturbance.   Respiratory: Positive for shortness of breath. Negative for cough and wheezing.    Cardiovascular: Negative for chest pain and palpitations.   Gastrointestinal: Negative for abdominal pain, blood in stool, constipation, diarrhea, nausea and vomiting.   Genitourinary: Negative for dysuria, flank pain, frequency and vaginal discharge.   Musculoskeletal: Positive for arthralgias. Negative for joint swelling.   Skin: Negative for rash.   Neurological: Negative for speech difficulty, weakness, light-headedness and headaches.   Psychiatric/Behavioral: Negative for behavioral problems and dysphoric mood.         Physical Exam  Vitals:    08/24/22 1419 08/24/22 1444   BP: (!) 160/86 (!) 160/82   BP Location:  Right arm   Patient Position:  Sitting   BP Method:  Medium (Manual)   Pulse: 67    Resp: 16    Temp: 98.7 °F (37.1 °C)    SpO2: 97%    Weight: 59.3 kg (130 lb 11.7 oz)    Height: 5' 4" (1.626 m)     Body mass index is 22.44 kg/m².  Weight: 59.3 kg (130 lb 11.7 oz)   Height: 5' 4" (162.6 cm)     Physical Exam  Vitals and nursing note reviewed.   Constitutional:       Appearance: She is well-developed. "   Abdominal:      General: There is distension.      Palpations: There is no mass.      Tenderness: There is no abdominal tenderness.      Hernia: No hernia is present.   Skin:     General: Skin is warm and dry.      Findings: No erythema or rash.   Neurological:      Mental Status: She is alert and oriented to person, place, and time.   Psychiatric:         Behavior: Behavior normal.         ALLERGIES AND MEDICATIONS: updated and reviewed.  Review of patient's allergies indicates:   Allergen Reactions    Azathioprine Shortness Of Breath and Other (See Comments)     Fatigue     Medication List with Changes/Refills   New Medications    KLGA KETOPROFEN 10% LIDOCAINE 10% GABAPENTIN 6% AMITRIPTYLINE 2% IN TRANSDERMAL CREAM    Apply 1 to 2 grams 3 to 4 times daily   Current Medications    ACCU-CHEK FASTCLIX LANCING DEV KIT    USE AS DIRECTED.    ALCOHOL ANTISEPTIC PADS (ALCOHOL PREP PADS TOP)        APIXABAN (ELIQUIS) 5 MG TAB    Take 1 tablet (5 mg total) by mouth 2 (two) times daily. Start this prescription after finishing starter pack    ATORVASTATIN (LIPITOR) 20 MG TABLET    Take 1 tablet by mouth once daily    BLOOD SUGAR DIAGNOSTIC (ACCU-CHEK SMARTVIEW TEST STRIP) STRP    Use as directed to check blood sugar twice daily.    BLOOD SUGAR DIAGNOSTIC STRP    To check BG three times daily, to use with insurance preferred meter    BLOOD-GLUCOSE METER KIT    Use as instructed    BLOOD-GLUCOSE METER KIT    To check BG three times daily, to use with insurance preferred meter    BRIVARACETAM (BRIVIACT) 75 MG TAB    Take 1 tablet (75 mg total) by mouth 2 (two) times daily.    CALCIUM CARBONATE (OS-MALCOLM) 600 MG CALCIUM (1,500 MG) TAB    Take 1 tablet by mouth 2 (two) times daily.     CARVEDILOL (COREG) 25 MG TABLET    Take 1 tablet (25 mg total) by mouth 2 (two) times daily.    DULOXETINE (CYMBALTA) 20 MG CAPSULE    Take 1 capsule (20 mg total) by mouth once daily.    EPOETIN WOLFGANG-EPBX (RETACRIT) 10,000 UNIT/ML IMJECTION     "Inject 20,000 Units into the skin every 14 (fourteen) days.    FENOFIBRATE 160 MG TAB    Take 1 tablet (160 mg total) by mouth once daily.    FLUTICASONE PROPION-SALMETEROL 115-21 MCG/DOSE (ADVAIR HFA) 115-21 MCG/ACTUATION HFAA INHALER    Inhale 2 puffs into the lungs every 12 (twelve) hours. Controller    FOLIC ACID (FOLVITE) 1 MG TABLET    Take 1 tablet by mouth once daily    HYDRALAZINE (APRESOLINE) 25 MG TABLET    Take 2 tabs every 8 hours by mouth. Check BP 2 hours after each dose and if Blood pressure >160- please take 1 extra tab    INSULIN DETEMIR U-100 (LEVEMIR FLEXTOUCH) 100 UNIT/ML (3 ML) SUBQ INPN PEN    Inject 10 Units into the skin every evening.    LEVETIRACETAM (KEPPRA) 250 MG TAB    Take 1 tablet (250 mg total) by mouth 2 (two) times daily.    LEVOTHYROXINE (SYNTHROID) 50 MCG TABLET    Take 1 tablet (50 mcg total) by mouth before breakfast.    NIFEDIPINE (PROCARDIA-XL) 60 MG (OSM) 24 HR TABLET    Take 1 tablet (60 mg total) by mouth once daily.    OLOPATADINE (PATANOL) 0.1 % OPHTHALMIC SOLUTION    Place 1 drop into both eyes daily as needed for Allergies.    PANTOPRAZOLE (PROTONIX) 40 MG TABLET    Take 1 tablet (40 mg total) by mouth once daily.    PEN NEEDLE, DIABETIC (NOVOFINE 32) 32 GAUGE X 1/4" NDLE    For use with PickUpPalmir pen .    POTASSIUM CHLORIDE SA (K-DUR,KLOR-CON) 20 MEQ TABLET    Take 1 tablet (20 mEq total) by mouth 2 (two) times daily.    PREDNISONE (DELTASONE) 5 MG TABLET    Take 1 tablet (5 mg total) by mouth once daily.    TIZANIDINE (ZANAFLEX) 2 MG TABLET    Take 2 tablets (4 mg total) by mouth every 8 (eight) hours as needed (muscle spasm).       Assessment & Plan  1. Chronic pain syndrome    2. Bloating    3. Shortness of breath    4. Sarcoidosis        Problem List Items Addressed This Visit        Neuro    Chronic pain - Primary (Chronic)  Continue cymbalta  F/u with rheumatology  Will order pain cream BID    Relevant Medications    KLGA ketoprofen 10% LIDOcaine 10% gabapentin " 6% amitriptyline 2% in transdermal cream    triamcinolone acetonide injection 80 mg (Completed)       Pulmonary    Shortness of breath  She is improving with advair    Overview     Acute onset of shortness of breath in June of this year.  CTA negative for parenchymal findings or PE.  Reportedly was attributed to sarcoid, however does not appear to have received high-dose steroids to treat such.  Unclear etiology but seems to have been an acute process given its complete resolution.  Reported significant improvement with administration of Advair, so stands to reason it may have been some lingering reactive airways disease.              Immunology/Multi System    Sarcoidosis (Chronic)  Continue f/u with pulm for workup    Overview     Diagnosed  at [facility] in 1981 with systemic manifestations of myopathy and arthropathy. Previously on Plaquenil and methotrexate but both discontinued due to adverse effects.  Leflunomide was also attempted but held due to elevated blood pressure.  Follows with Rheumatology.  Currently on prednisone 5 mg daily.  No evidence of pulmonary involvement on serial CTs dating back for years, including LAD. 2019 PFT with mild restriction.             Other Visit Diagnoses     Bloating      She denies pain, constipation or diarrhea            Follow up in about 3 months (around 11/24/2022) for management of chronic conditions.       Micaela Mendoza MD

## 2022-08-30 ENCOUNTER — PATIENT MESSAGE (OUTPATIENT)
Dept: FAMILY MEDICINE | Facility: CLINIC | Age: 77
End: 2022-08-30
Payer: MEDICARE

## 2022-08-31 ENCOUNTER — PATIENT MESSAGE (OUTPATIENT)
Dept: FAMILY MEDICINE | Facility: CLINIC | Age: 77
End: 2022-08-31
Payer: MEDICARE

## 2022-08-31 NOTE — TELEPHONE ENCOUNTER
Spoke with pharmacy to confirm the patient's pain topical medication (KLGA ketoprofen 10% LIDOcaine 10% gabapentin 6% amitriptyline 2% in transdermal cream) that was refilled 8/24/22. They stated they will contact patient. Notified patient through [employer] messages.

## 2022-09-07 DIAGNOSIS — N18.9 CKD (CHRONIC KIDNEY DISEASE): Primary | ICD-10-CM

## 2022-09-09 ENCOUNTER — INFUSION (OUTPATIENT)
Dept: INFUSION THERAPY | Facility: HOSPITAL | Age: 77
End: 2022-09-09
Attending: INTERNAL MEDICINE
Payer: MEDICARE

## 2022-09-09 VITALS
RESPIRATION RATE: 16 BRPM | HEART RATE: 86 BPM | SYSTOLIC BLOOD PRESSURE: 165 MMHG | DIASTOLIC BLOOD PRESSURE: 80 MMHG | OXYGEN SATURATION: 96 % | TEMPERATURE: 98 F

## 2022-09-09 DIAGNOSIS — D50.9 IRON DEFICIENCY ANEMIA, UNSPECIFIED IRON DEFICIENCY ANEMIA TYPE: ICD-10-CM

## 2022-09-09 DIAGNOSIS — Z53.1 REFUSAL OF BLOOD TRANSFUSIONS AS PATIENT IS JEHOVAH'S WITNESS: ICD-10-CM

## 2022-09-09 DIAGNOSIS — N18.30 ANEMIA IN STAGE 3 CHRONIC KIDNEY DISEASE, UNSPECIFIED WHETHER STAGE 3A OR 3B CKD: Primary | ICD-10-CM

## 2022-09-09 DIAGNOSIS — D63.1 ANEMIA IN STAGE 3 CHRONIC KIDNEY DISEASE, UNSPECIFIED WHETHER STAGE 3A OR 3B CKD: Primary | ICD-10-CM

## 2022-09-09 DIAGNOSIS — N18.9 ANEMIA IN CHRONIC KIDNEY DISEASE, UNSPECIFIED CKD STAGE: ICD-10-CM

## 2022-09-09 DIAGNOSIS — D63.1 ANEMIA IN CHRONIC KIDNEY DISEASE, UNSPECIFIED CKD STAGE: ICD-10-CM

## 2022-09-09 PROCEDURE — 63600175 PHARM REV CODE 636 W HCPCS: Mod: JG | Performed by: INTERNAL MEDICINE

## 2022-09-09 PROCEDURE — 96372 THER/PROPH/DIAG INJ SC/IM: CPT

## 2022-09-09 RX ADMIN — EPOETIN ALFA-EPBX 20000 UNITS: 10000 INJECTION, SOLUTION INTRAVENOUS; SUBCUTANEOUS at 11:09

## 2022-09-09 NOTE — PLAN OF CARE
Pt arrived to unit. VSS. No new issues. Tolerated injection. Pt has next appts. Pt ambulated off unit. No distress noted.

## 2022-09-12 ENCOUNTER — PATIENT MESSAGE (OUTPATIENT)
Dept: FAMILY MEDICINE | Facility: CLINIC | Age: 77
End: 2022-09-12
Payer: MEDICARE

## 2022-09-12 DIAGNOSIS — I10 HYPERTENSION, UNCONTROLLED: Primary | ICD-10-CM

## 2022-09-12 RX ORDER — NIFEDIPINE 90 MG/1
90 TABLET, EXTENDED RELEASE ORAL DAILY
Qty: 90 TABLET | Refills: 1 | Status: SHIPPED | OUTPATIENT
Start: 2022-09-12 | End: 2023-05-05

## 2022-09-12 NOTE — TELEPHONE ENCOUNTER
189/100 - last night woke up with a severe headache  This AM @ 0700 - 160/99. She just checked it and systolic is stilll 160s.She's been taking the hydralazine, but she has been checking her pressure multiple times and it's still elevated. Takes Tylenol for her back and hip pains, but that doesn't help.

## 2022-09-12 NOTE — TELEPHONE ENCOUNTER
Can we find out how bad her pain is and over what period of time these readings were taken    Micaela Mendoza MD     Refill

## 2022-09-19 ENCOUNTER — OFFICE VISIT (OUTPATIENT)
Dept: HEMATOLOGY/ONCOLOGY | Facility: CLINIC | Age: 77
End: 2022-09-19
Payer: MEDICARE

## 2022-09-19 VITALS
SYSTOLIC BLOOD PRESSURE: 157 MMHG | BODY MASS INDEX: 24.54 KG/M2 | WEIGHT: 133.38 LBS | DIASTOLIC BLOOD PRESSURE: 73 MMHG | OXYGEN SATURATION: 99 % | HEIGHT: 62 IN | TEMPERATURE: 98 F | HEART RATE: 91 BPM

## 2022-09-19 DIAGNOSIS — N18.9 CHRONIC KIDNEY DISEASE, UNSPECIFIED CKD STAGE: ICD-10-CM

## 2022-09-19 DIAGNOSIS — N18.9 ANEMIA IN CHRONIC KIDNEY DISEASE, UNSPECIFIED CKD STAGE: Primary | ICD-10-CM

## 2022-09-19 DIAGNOSIS — D63.1 ANEMIA IN CHRONIC KIDNEY DISEASE, UNSPECIFIED CKD STAGE: Primary | ICD-10-CM

## 2022-09-19 DIAGNOSIS — D50.9 IRON DEFICIENCY ANEMIA, UNSPECIFIED IRON DEFICIENCY ANEMIA TYPE: ICD-10-CM

## 2022-09-19 PROCEDURE — 3077F PR MOST RECENT SYSTOLIC BLOOD PRESSURE >= 140 MM HG: ICD-10-PCS | Mod: CPTII,S$GLB,, | Performed by: INTERNAL MEDICINE

## 2022-09-19 PROCEDURE — 99999 PR PBB SHADOW E&M-EST. PATIENT-LVL III: ICD-10-PCS | Mod: PBBFAC,,, | Performed by: INTERNAL MEDICINE

## 2022-09-19 PROCEDURE — 3288F FALL RISK ASSESSMENT DOCD: CPT | Mod: CPTII,S$GLB,, | Performed by: INTERNAL MEDICINE

## 2022-09-19 PROCEDURE — 3078F DIAST BP <80 MM HG: CPT | Mod: CPTII,S$GLB,, | Performed by: INTERNAL MEDICINE

## 2022-09-19 PROCEDURE — 3078F PR MOST RECENT DIASTOLIC BLOOD PRESSURE < 80 MM HG: ICD-10-PCS | Mod: CPTII,S$GLB,, | Performed by: INTERNAL MEDICINE

## 2022-09-19 PROCEDURE — 3288F PR FALLS RISK ASSESSMENT DOCUMENTED: ICD-10-PCS | Mod: CPTII,S$GLB,, | Performed by: INTERNAL MEDICINE

## 2022-09-19 PROCEDURE — 1126F AMNT PAIN NOTED NONE PRSNT: CPT | Mod: CPTII,S$GLB,, | Performed by: INTERNAL MEDICINE

## 2022-09-19 PROCEDURE — 1101F PR PT FALLS ASSESS DOC 0-1 FALLS W/OUT INJ PAST YR: ICD-10-PCS | Mod: CPTII,S$GLB,, | Performed by: INTERNAL MEDICINE

## 2022-09-19 PROCEDURE — 3072F LOW RISK FOR RETINOPATHY: CPT | Mod: CPTII,S$GLB,, | Performed by: INTERNAL MEDICINE

## 2022-09-19 PROCEDURE — 1126F PR PAIN SEVERITY QUANTIFIED, NO PAIN PRESENT: ICD-10-PCS | Mod: CPTII,S$GLB,, | Performed by: INTERNAL MEDICINE

## 2022-09-19 PROCEDURE — 99214 PR OFFICE/OUTPT VISIT, EST, LEVL IV, 30-39 MIN: ICD-10-PCS | Mod: S$GLB,,, | Performed by: INTERNAL MEDICINE

## 2022-09-19 PROCEDURE — 1157F PR ADVANCE CARE PLAN OR EQUIV PRESENT IN MEDICAL RECORD: ICD-10-PCS | Mod: CPTII,S$GLB,, | Performed by: INTERNAL MEDICINE

## 2022-09-19 PROCEDURE — 1101F PT FALLS ASSESS-DOCD LE1/YR: CPT | Mod: CPTII,S$GLB,, | Performed by: INTERNAL MEDICINE

## 2022-09-19 PROCEDURE — 3077F SYST BP >= 140 MM HG: CPT | Mod: CPTII,S$GLB,, | Performed by: INTERNAL MEDICINE

## 2022-09-19 PROCEDURE — 3072F PR LOW RISK FOR RETINOPATHY: ICD-10-PCS | Mod: CPTII,S$GLB,, | Performed by: INTERNAL MEDICINE

## 2022-09-19 PROCEDURE — 1159F PR MEDICATION LIST DOCUMENTED IN MEDICAL RECORD: ICD-10-PCS | Mod: CPTII,S$GLB,, | Performed by: INTERNAL MEDICINE

## 2022-09-19 PROCEDURE — 1157F ADVNC CARE PLAN IN RCRD: CPT | Mod: CPTII,S$GLB,, | Performed by: INTERNAL MEDICINE

## 2022-09-19 PROCEDURE — 1159F MED LIST DOCD IN RCRD: CPT | Mod: CPTII,S$GLB,, | Performed by: INTERNAL MEDICINE

## 2022-09-19 PROCEDURE — 99214 OFFICE O/P EST MOD 30 MIN: CPT | Mod: S$GLB,,, | Performed by: INTERNAL MEDICINE

## 2022-09-19 PROCEDURE — 99999 PR PBB SHADOW E&M-EST. PATIENT-LVL III: CPT | Mod: PBBFAC,,, | Performed by: INTERNAL MEDICINE

## 2022-09-19 NOTE — Clinical Note
CBC q2wks STANDING Plan IV FE this Fri Cont EPO  inj q 2wks( pending lab parameters) F/u in 3mos with labs

## 2022-09-19 NOTE — PROGRESS NOTES
Subjective:        Patient ID: Regino Lawrence is a 76 y.o. female.    Chief Complaint: Follow-up anemia      Diagnosis: Anemia in CKD  Patient is a Tenriism  .  Prior Hx;  The patient is seen today for f/u  chronic anemia in CKD undergoing EPO injections.The patient reports that she has been diagnosed with JOLLY in the past.  She has been on oral iron supplementation therapy, but could not tolerate or did not respond, she is uncertainShe also has  undergone intermittent IV iron therapy.  She is followed by GI and has undergone a colonoscopy earlier this year and was diagnosed with hemorrhoids for which she underwent banding procedure.  No melena, hematochezia,change in bowel habits.  She has also been diagnosed with B12 deficiency in the past, but reports she did not respond to B12 injections. No history of blood transfusions.  She is a Tenriism.  She reports that she remembers getting injections in the 1970s when her blood count was low.   She is followed by Rheumatology for history of sarcoidosis with associated myopathy and arthropathy. She has been treated in the  past with methotrexate and Plaquenil, both of which were ineffectiveCellcept and imuran caused some unknown side effect. She is followed by PCP for DM        Interval Hx;  Doing better since last visit   She was admitted 6/2022 with worsening SOB   Symptoms improved with 02  She doesn't feel good  She continues with Chronic diffuse arthralgias  Followed by pain management and Rheumatology   In [facility]  She is undergoing epo inj q 2wks  Hb  10.1g/dL on 9/9/22   No CP/cough  Chronic Mild fatigue  Mild FAUSTIN-stable  No melena, hematochezia or change in bowel habits  She also has  undergone intermittent IV iron therapy  She also has history of cervical spinal stenosis s/p posterior C3-C7 laminectomy and fusion on 11/16/15 by Dr. Conner.  She has received COVD vaccination           PAST MEDICAL HISTORY:  Acid reflux, alopecia,  "anemia, anxiety disorder, chronic  kidney disease, depression, diabetes mellitus type 2, hyperlipidemia,  hypertension, hypothyroidism, osteoporosis, sarcoidosis.    PAST SURGICAL HISTORY:  Cholecystectomy, , tubal ligation, carpal tunnel release, cataracts.    FAMILY HISTORY: Unremarkable for cancer. Significant for HTN.       Review of Systems   Constitutional:  Positive for fatigue (chronic, mild). Negative for activity change and appetite change.   HENT:  Negative for hearing loss and nosebleeds.    Eyes:  Negative for visual disturbance.   Respiratory:  Negative for cough and shortness of breath.    Cardiovascular:  Negative for chest pain and leg swelling.   Gastrointestinal:  Negative for abdominal pain, constipation, diarrhea and nausea.   Genitourinary:  Negative for flank pain and urgency.   Musculoskeletal:  Positive for arthralgias and back pain. Negative for gait problem and joint swelling.   Skin:  Negative for rash.        No petechiae, ecchymoses   Neurological:  Negative for weakness, light-headedness and headaches.   Hematological:  Negative for adenopathy. Does not bruise/bleed easily.     Objective:         Vitals:    22 1032   BP: (!) 157/73   BP Location: Left arm   Patient Position: Sitting   BP Method: Large (Automatic)   Pulse: 91   Temp: 97.9 °F (36.6 °C)   TempSrc: Oral   SpO2: 99%   Weight: 60.5 kg (133 lb 6.1 oz)   Height: 5' 2" (1.575 m)           .Physical Exam   Constitutional: She is oriented to person, place, and time. She appears well-developed and well-nourished.   HENT:   Head: Normocephalic.   Eyes: Conjunctivae and lids are normal.No scleral icterus.   Neck: Normal range of motion. Neck supple. No thyromegaly present.   Cardiovascular: Normal rate, regular rhythm and normal heart sounds.    No murmur heard.  Pulmonary/Chest: Breath sounds normal. She has no wheezes. She has no rales.   Abdominal: Soft. Bowel sounds are normal. There is no tenderness. There is no " rebound and no guarding.   Musculoskeletal: Ambulates w/assistance of motorized scooter  Neurological: She is alert and oriented to person, place, and time. No cranial nerve deficit.  Skin: Skin is warm and dry. No ecchymosis, no petechiae and no rash noted. No erythema.   Psychiatric: She has a normal mood and affect.               Lab Results   Component Value Date    WBC 6.46 09/09/2022    HGB 10.1 (L) 09/09/2022    HCT 29.8 (L) 09/09/2022    MCV 96 09/09/2022     09/09/2022     Lab Results   Component Value Date    IRON 79 09/09/2022    IRON 79 09/09/2022    TIBC 447 09/09/2022    FERRITIN 98 09/09/2022     SPEP-nl      CT renal 3/6/2017   IMPRESSION:  1.  No renal, ureteral or bladder calculi.  No hydronephrosis or ureterectasis.  2.  Poorly distended bladder.  Mild bladder wall prominence.  Mild cystitis cannot be   excluded.  3.  Moderate constipation.  Normal appendix      Lab Results   Component Value Date    IRON 79 09/09/2022    IRON 79 09/09/2022    TIBC 447 09/09/2022    FERRITIN 98 09/09/2022     Lab Results   Component Value Date    WBC 6.46 09/09/2022    HGB 10.1 (L) 09/09/2022    HCT 29.8 (L) 09/09/2022    MCV 96 09/09/2022     09/09/2022         Assessment:       1. Anemia in chronic kidney disease, unspecified CKD stage    2. Chronic kidney disease, unspecified CKD stage    3. Iron deficiency anemia, unspecified iron deficiency anemia type    4. Patient is Church          Plan:   1.2.,3.,4.    Pt is a Jain and declines/not interested in blood and blood products due to Hindu beliefs  She is undergoing epo inj q 2wks  Hb 10.1 g/dL    Pt followed by Nephrology . It has been determined early CKD stage III, suspect due to age-related renal nephron loss, along with possible diabetic nephropathy v. hypertensive nephrosclerosis  CBC q2wks STANDING  Cont EPO  inj q 2wks( pending lab parameters)  Continue periodic monitoring of Fe studies  According to KIDIGO  guidelines patients with CKD , a  gfr , <60 cc/min and anemia, iron therapy is appropriate if the Tsat is < 30 and ferritin < 500 mg/dl.    Plan IV Fe  Follow-up with PCP for med mgmt      CBC q2wks STANDING  Plan IV FE this Fri  Cont EPO  inj q 2wks( pending lab parameters)  F/u in 3mos with labs      Referral to ED  Advance Care Planning     Power of   I previously initiated the process of advance care planning today and explained the importance of this process to the patient.  I introduced the concept of advance directives to the patient, as well. Then the patient received detailed information about the importance of designating a Health Care Power of  (HCPOA). She was also instructed to communicate with this person about their wishes for future healthcare, should she become sick and lose decision-making capacity. The patient has  previously appointed a HCPOA. Pt completed in 2015           CC: Micaela Mendoza M.D.

## 2022-09-23 ENCOUNTER — INFUSION (OUTPATIENT)
Dept: INFUSION THERAPY | Facility: HOSPITAL | Age: 77
End: 2022-09-23
Attending: INTERNAL MEDICINE
Payer: MEDICARE

## 2022-09-23 VITALS
RESPIRATION RATE: 17 BRPM | DIASTOLIC BLOOD PRESSURE: 67 MMHG | HEART RATE: 92 BPM | OXYGEN SATURATION: 98 % | SYSTOLIC BLOOD PRESSURE: 147 MMHG

## 2022-09-23 DIAGNOSIS — N18.30 ANEMIA IN STAGE 3 CHRONIC KIDNEY DISEASE, UNSPECIFIED WHETHER STAGE 3A OR 3B CKD: Primary | ICD-10-CM

## 2022-09-23 DIAGNOSIS — D63.1 ANEMIA IN STAGE 3 CHRONIC KIDNEY DISEASE, UNSPECIFIED WHETHER STAGE 3A OR 3B CKD: Primary | ICD-10-CM

## 2022-09-23 DIAGNOSIS — D50.9 IRON DEFICIENCY ANEMIA, UNSPECIFIED IRON DEFICIENCY ANEMIA TYPE: ICD-10-CM

## 2022-09-23 DIAGNOSIS — D63.1 ANEMIA IN CHRONIC KIDNEY DISEASE, UNSPECIFIED CKD STAGE: ICD-10-CM

## 2022-09-23 DIAGNOSIS — N18.9 ANEMIA IN CHRONIC KIDNEY DISEASE, UNSPECIFIED CKD STAGE: ICD-10-CM

## 2022-09-23 DIAGNOSIS — Z53.1 REFUSAL OF BLOOD TRANSFUSIONS AS PATIENT IS JEHOVAH'S WITNESS: ICD-10-CM

## 2022-09-23 PROCEDURE — 25000003 PHARM REV CODE 250: Performed by: INTERNAL MEDICINE

## 2022-09-23 PROCEDURE — 96365 THER/PROPH/DIAG IV INF INIT: CPT

## 2022-09-23 PROCEDURE — 63600175 PHARM REV CODE 636 W HCPCS: Mod: JG,EC | Performed by: INTERNAL MEDICINE

## 2022-09-23 PROCEDURE — 96372 THER/PROPH/DIAG INJ SC/IM: CPT | Mod: 59

## 2022-09-23 RX ADMIN — EPOETIN ALFA-EPBX 20000 UNITS: 20000 INJECTION, SOLUTION INTRAVENOUS; SUBCUTANEOUS at 12:09

## 2022-09-23 RX ADMIN — FERUMOXYTOL 510 MG: 510 INJECTION INTRAVENOUS at 12:09

## 2022-09-23 NOTE — PLAN OF CARE
Pt arrived to unit. VSS. Tolerated injection along with Feraheme. Pt has next appts. Pt discharged from unit in motorized scooter. No distress noted

## 2022-09-26 ENCOUNTER — OUTPATIENT CASE MANAGEMENT (OUTPATIENT)
Dept: ADMINISTRATIVE | Facility: OTHER | Age: 77
End: 2022-09-26
Payer: MEDICARE

## 2022-09-27 ENCOUNTER — PATIENT MESSAGE (OUTPATIENT)
Dept: PHARMACY | Facility: CLINIC | Age: 77
End: 2022-09-27
Payer: MEDICARE

## 2022-09-27 NOTE — TELEPHONE ENCOUNTER
A 2nd attempt has been made to establish contact with Regino Lawrence  via LETTER. The final contact attempt will be made in 5 business days

## 2022-09-27 NOTE — PROGRESS NOTES
Outpatient Care Management  Plan of Care Follow Up Visit    Patient: Regino Lawrence  MRN: [id number]  Date of Service: 09/26/2022  Completed by: Mirna Leos RN  Referral Date: 06/14/2022    Reason for Visit   Patient presents with    OPCM Chart Review     9/26/2022    Update Plan Of Care     9/26/2022       Brief Summary:   Sarcoidosis Care Plan:  Cymbalta is said to be helping Regino with pain. She is trying to schedule her 3 mo f/u PULM appt from last appt 8/4. Regino confirms reading through the mailed information on Sarcoidosis. Due to pt responding to Advair inhalant-  the root cause being sarcoidosis to lung continues to be worked up. Dxd with Multisegemental pulm emboli: Taking Eliquis. She denies current resp concerns. Continues Prednisone 5 mg daily maintenance. Continues REtacrit infusions for treatment of iron def anemia d/t CKD3- Sabianism belief prohibit bld transfusions.  Regino reports home BP checks trending up BPs- ie -190s/ DBPs 100-104. She reported readings to her PCP. Nifedipine increased from 60 mg to 90 md QD. SBPs now 150-160s. Reported to have Pulm HTN.   Pt denies connecting with Nella Love with PAP regarding cost savings for Eliquis, Levemir.   ADDENDUM: 9/27- Nella Love reports reaching out to patient 6/20/2022 & 8/15/2022. Pt has not submitted needed information. Nella to resend letter once more.   7/8/2022 labs, BUN/Cr wnl.     Regino reports on tentative plans to see how she does off of Advair Inhaler. Advair inhaler use remains current- likely pending next PULM appt due in NOV 2022. S/p Echo 8/4- EF 68%, Mild Pulm HTN.    F/u in 2 wks to check on resp status,BP readings, PULM appt scheduled, complete education on anti-inflammatory foods, pt's start to eat Turkey Shen.     Patient Summary     Involvement of Care:  Do I have permission to speak with other family members about your care?       Patient Reported Labs & Vitals:  1.  Any Patient Reported Labs & Vitals?      2.  Patient Reported Blood Pressure:     3.  Patient Reported Pulse:     4.  Patient Reported Weight (Kg):     5.  Patient Reported Blood Glucose (mg/dl):       Medical and social history was reviewed with patient and/or caregiver.     Clinical Assessment     Reviewed and provided basic information on available community resources for mental health, transportation, wellness resources, and palliative care programs with patient and/or caregiver.     Complex Care Plan     Care plan was discussed and completed today with input from patient and/or caregiver.    Patient Instructions     Instructions were provided via the [employer] patient resources and are available for the patient to view on the patient portal.        Follow up in about 15 days (around 10/11/2022) for OPCM RN Follow Up to update care plan.    Todays OPCM Self-Management Care Plan was developed with the patients/caregivers input and was based on identified barriers from todays assessment.  Goals were written today with the patient/caregiver and the patient has agreed to work towards these goals to improve his/her overall well-being. Patient verbalized understanding of the care plan, goals, and all of today's instructions. Encouraged patient/caregiver to communicate with his/her physician and health care team about health conditions and the treatment plan.  Provided my contact information today and encouraged patient/caregiver to call me with any questions as needed.

## 2022-09-28 ENCOUNTER — TELEPHONE (OUTPATIENT)
Dept: FAMILY MEDICINE | Facility: CLINIC | Age: 77
End: 2022-09-28

## 2022-09-28 DIAGNOSIS — Z78.0 MENOPAUSE: ICD-10-CM

## 2022-09-28 NOTE — TELEPHONE ENCOUNTER
----- Message from Nella Love sent at 9/28/2022 12:39 PM CDT -----  Regarding: Pharmacy Patient Assistance  On 8/23/22 I fax [phone] a [place] application for Dr. Mendoza to sign.  I just refax it today.  Please let me know if Dr. Mendoza is not going to sign so I can advise the patient.

## 2022-10-07 ENCOUNTER — LAB VISIT (OUTPATIENT)
Dept: LAB | Facility: HOSPITAL | Age: 77
End: 2022-10-07
Attending: INTERNAL MEDICINE
Payer: MEDICARE

## 2022-10-07 ENCOUNTER — INFUSION (OUTPATIENT)
Dept: INFUSION THERAPY | Facility: HOSPITAL | Age: 77
End: 2022-10-07
Attending: INTERNAL MEDICINE
Payer: MEDICARE

## 2022-10-07 VITALS
RESPIRATION RATE: 18 BRPM | HEART RATE: 92 BPM | TEMPERATURE: 98 F | OXYGEN SATURATION: 97 % | SYSTOLIC BLOOD PRESSURE: 170 MMHG | DIASTOLIC BLOOD PRESSURE: 78 MMHG

## 2022-10-07 DIAGNOSIS — D50.9 IRON DEFICIENCY ANEMIA, UNSPECIFIED IRON DEFICIENCY ANEMIA TYPE: ICD-10-CM

## 2022-10-07 DIAGNOSIS — N18.9 ANEMIA IN CHRONIC KIDNEY DISEASE, UNSPECIFIED CKD STAGE: ICD-10-CM

## 2022-10-07 DIAGNOSIS — N18.30 ANEMIA IN STAGE 3 CHRONIC KIDNEY DISEASE, UNSPECIFIED WHETHER STAGE 3A OR 3B CKD: Primary | ICD-10-CM

## 2022-10-07 DIAGNOSIS — D63.1 ANEMIA IN CHRONIC KIDNEY DISEASE, UNSPECIFIED CKD STAGE: ICD-10-CM

## 2022-10-07 DIAGNOSIS — Z53.1 REFUSAL OF BLOOD TRANSFUSIONS AS PATIENT IS JEHOVAH'S WITNESS: ICD-10-CM

## 2022-10-07 DIAGNOSIS — D63.1 ANEMIA IN STAGE 3 CHRONIC KIDNEY DISEASE, UNSPECIFIED WHETHER STAGE 3A OR 3B CKD: Primary | ICD-10-CM

## 2022-10-07 LAB
BASOPHILS # BLD AUTO: 0.02 K/UL (ref 0–0.2)
BASOPHILS NFR BLD: 0.4 % (ref 0–1.9)
DIFFERENTIAL METHOD: ABNORMAL
EOSINOPHIL # BLD AUTO: 0 K/UL (ref 0–0.5)
EOSINOPHIL NFR BLD: 0.2 % (ref 0–8)
ERYTHROCYTE [DISTWIDTH] IN BLOOD BY AUTOMATED COUNT: 14.5 % (ref 11.5–14.5)
HCT VFR BLD AUTO: 30.4 % (ref 37–48.5)
HGB BLD-MCNC: 10.2 G/DL (ref 12–16)
IMM GRANULOCYTES # BLD AUTO: 0.06 K/UL (ref 0–0.04)
IMM GRANULOCYTES NFR BLD AUTO: 1.2 % (ref 0–0.5)
LYMPHOCYTES # BLD AUTO: 0.9 K/UL (ref 1–4.8)
LYMPHOCYTES NFR BLD: 16.5 % (ref 18–48)
MCH RBC QN AUTO: 33.6 PG (ref 27–31)
MCHC RBC AUTO-ENTMCNC: 33.6 G/DL (ref 32–36)
MCV RBC AUTO: 100 FL (ref 82–98)
MONOCYTES # BLD AUTO: 0.5 K/UL (ref 0.3–1)
MONOCYTES NFR BLD: 8.7 % (ref 4–15)
NEUTROPHILS # BLD AUTO: 3.8 K/UL (ref 1.8–7.7)
NEUTROPHILS NFR BLD: 73 % (ref 38–73)
NRBC BLD-RTO: 0 /100 WBC
PLATELET # BLD AUTO: 259 K/UL (ref 150–450)
PMV BLD AUTO: 8.3 FL (ref 9.2–12.9)
RBC # BLD AUTO: 3.04 M/UL (ref 4–5.4)
WBC # BLD AUTO: 5.15 K/UL (ref 3.9–12.7)

## 2022-10-07 PROCEDURE — 85025 COMPLETE CBC W/AUTO DIFF WBC: CPT | Performed by: INTERNAL MEDICINE

## 2022-10-07 PROCEDURE — 63600175 PHARM REV CODE 636 W HCPCS: Mod: JG | Performed by: INTERNAL MEDICINE

## 2022-10-07 PROCEDURE — 36415 COLL VENOUS BLD VENIPUNCTURE: CPT | Performed by: INTERNAL MEDICINE

## 2022-10-07 RX ADMIN — EPOETIN ALFA-EPBX 20000 UNITS: 10000 INJECTION, SOLUTION INTRAVENOUS; SUBCUTANEOUS at 12:10

## 2022-10-07 NOTE — PLAN OF CARE
Pt received q2w RETAcrit, 35130x. Labs done prior to. Hgb is 10.2 today. Pt tolerated injection well. Has next appointments. Discharged from unit using motor scooter.

## 2022-10-25 ENCOUNTER — PATIENT MESSAGE (OUTPATIENT)
Dept: NEUROSURGERY | Facility: CLINIC | Age: 77
End: 2022-10-25
Payer: MEDICARE

## 2022-10-25 DIAGNOSIS — Z98.1 S/P CERVICAL SPINAL FUSION: Primary | ICD-10-CM

## 2022-10-27 ENCOUNTER — HOSPITAL ENCOUNTER (OUTPATIENT)
Dept: RADIOLOGY | Facility: HOSPITAL | Age: 77
Discharge: HOME | End: 2022-10-27
Attending: NEUROLOGICAL SURGERY
Payer: MEDICARE

## 2022-10-27 ENCOUNTER — OFFICE VISIT (OUTPATIENT)
Dept: NEUROSURGERY | Facility: CLINIC | Age: 77
End: 2022-10-27
Payer: MEDICARE

## 2022-10-27 ENCOUNTER — HOSPITAL ENCOUNTER (OUTPATIENT)
Dept: RADIOLOGY | Facility: HOSPITAL | Age: 77
Discharge: HOME | End: 2022-10-27
Attending: PHYSICIAN ASSISTANT
Payer: MEDICARE

## 2022-10-27 ENCOUNTER — INFUSION (OUTPATIENT)
Dept: INFUSION THERAPY | Facility: HOSPITAL | Age: 77
End: 2022-10-27
Attending: INTERNAL MEDICINE
Payer: MEDICARE

## 2022-10-27 VITALS
WEIGHT: 134.25 LBS | DIASTOLIC BLOOD PRESSURE: 89 MMHG | RESPIRATION RATE: 17 BRPM | HEART RATE: 96 BPM | SYSTOLIC BLOOD PRESSURE: 154 MMHG | SYSTOLIC BLOOD PRESSURE: 174 MMHG | HEART RATE: 100 BPM | OXYGEN SATURATION: 96 % | BODY MASS INDEX: 24.56 KG/M2 | TEMPERATURE: 98 F | OXYGEN SATURATION: 98 % | DIASTOLIC BLOOD PRESSURE: 74 MMHG

## 2022-10-27 DIAGNOSIS — S13.100A SUBLUXATION OF CERVICAL VERTEBRA, INITIAL ENCOUNTER: ICD-10-CM

## 2022-10-27 DIAGNOSIS — Z53.1 REFUSAL OF BLOOD TRANSFUSIONS AS PATIENT IS JEHOVAH'S WITNESS: ICD-10-CM

## 2022-10-27 DIAGNOSIS — N18.30 ANEMIA IN STAGE 3 CHRONIC KIDNEY DISEASE, UNSPECIFIED WHETHER STAGE 3A OR 3B CKD: Primary | ICD-10-CM

## 2022-10-27 DIAGNOSIS — Z98.1 S/P CERVICAL SPINAL FUSION: ICD-10-CM

## 2022-10-27 DIAGNOSIS — Z98.1 S/P CERVICAL SPINAL FUSION: Primary | ICD-10-CM

## 2022-10-27 DIAGNOSIS — D63.1 ANEMIA IN CHRONIC KIDNEY DISEASE, UNSPECIFIED CKD STAGE: ICD-10-CM

## 2022-10-27 DIAGNOSIS — N18.9 ANEMIA IN CHRONIC KIDNEY DISEASE, UNSPECIFIED CKD STAGE: ICD-10-CM

## 2022-10-27 DIAGNOSIS — D50.9 IRON DEFICIENCY ANEMIA, UNSPECIFIED IRON DEFICIENCY ANEMIA TYPE: ICD-10-CM

## 2022-10-27 DIAGNOSIS — D63.1 ANEMIA IN STAGE 3 CHRONIC KIDNEY DISEASE, UNSPECIFIED WHETHER STAGE 3A OR 3B CKD: Primary | ICD-10-CM

## 2022-10-27 PROCEDURE — 1157F PR ADVANCE CARE PLAN OR EQUIV PRESENT IN MEDICAL RECORD: ICD-10-PCS | Mod: CPTII,S$GLB,, | Performed by: PHYSICIAN ASSISTANT

## 2022-10-27 PROCEDURE — 3079F PR MOST RECENT DIASTOLIC BLOOD PRESSURE 80-89 MM HG: ICD-10-PCS | Mod: CPTII,S$GLB,, | Performed by: PHYSICIAN ASSISTANT

## 2022-10-27 PROCEDURE — 1101F PT FALLS ASSESS-DOCD LE1/YR: CPT | Mod: CPTII,S$GLB,, | Performed by: PHYSICIAN ASSISTANT

## 2022-10-27 PROCEDURE — 72125 CT NECK SPINE W/O DYE: CPT | Mod: TC

## 2022-10-27 PROCEDURE — 99999 PR PBB SHADOW E&M-EST. PATIENT-LVL III: ICD-10-PCS | Mod: PBBFAC,,, | Performed by: PHYSICIAN ASSISTANT

## 2022-10-27 PROCEDURE — 63600175 PHARM REV CODE 636 W HCPCS: Mod: JG | Performed by: INTERNAL MEDICINE

## 2022-10-27 PROCEDURE — 72050 X-RAY EXAM NECK SPINE 4/5VWS: CPT | Mod: TC,FY

## 2022-10-27 PROCEDURE — 3072F LOW RISK FOR RETINOPATHY: CPT | Mod: CPTII,S$GLB,, | Performed by: PHYSICIAN ASSISTANT

## 2022-10-27 PROCEDURE — 1160F RVW MEDS BY RX/DR IN RCRD: CPT | Mod: CPTII,S$GLB,, | Performed by: PHYSICIAN ASSISTANT

## 2022-10-27 PROCEDURE — 1125F AMNT PAIN NOTED PAIN PRSNT: CPT | Mod: CPTII,S$GLB,, | Performed by: PHYSICIAN ASSISTANT

## 2022-10-27 PROCEDURE — 3077F PR MOST RECENT SYSTOLIC BLOOD PRESSURE >= 140 MM HG: ICD-10-PCS | Mod: CPTII,S$GLB,, | Performed by: PHYSICIAN ASSISTANT

## 2022-10-27 PROCEDURE — 3079F DIAST BP 80-89 MM HG: CPT | Mod: CPTII,S$GLB,, | Performed by: PHYSICIAN ASSISTANT

## 2022-10-27 PROCEDURE — 3288F FALL RISK ASSESSMENT DOCD: CPT | Mod: CPTII,S$GLB,, | Performed by: PHYSICIAN ASSISTANT

## 2022-10-27 PROCEDURE — 1159F MED LIST DOCD IN RCRD: CPT | Mod: CPTII,S$GLB,, | Performed by: PHYSICIAN ASSISTANT

## 2022-10-27 PROCEDURE — 1125F PR PAIN SEVERITY QUANTIFIED, PAIN PRESENT: ICD-10-PCS | Mod: CPTII,S$GLB,, | Performed by: PHYSICIAN ASSISTANT

## 2022-10-27 PROCEDURE — 3072F PR LOW RISK FOR RETINOPATHY: ICD-10-PCS | Mod: CPTII,S$GLB,, | Performed by: PHYSICIAN ASSISTANT

## 2022-10-27 PROCEDURE — 3077F SYST BP >= 140 MM HG: CPT | Mod: CPTII,S$GLB,, | Performed by: PHYSICIAN ASSISTANT

## 2022-10-27 PROCEDURE — 1157F ADVNC CARE PLAN IN RCRD: CPT | Mod: CPTII,S$GLB,, | Performed by: PHYSICIAN ASSISTANT

## 2022-10-27 PROCEDURE — 72125 CT CERVICAL SPINE WITHOUT CONTRAST: ICD-10-PCS | Mod: 26,,, | Performed by: RADIOLOGY

## 2022-10-27 PROCEDURE — 99999 PR PBB SHADOW E&M-EST. PATIENT-LVL III: CPT | Mod: PBBFAC,,, | Performed by: PHYSICIAN ASSISTANT

## 2022-10-27 PROCEDURE — 72125 CT NECK SPINE W/O DYE: CPT | Mod: 26,,, | Performed by: RADIOLOGY

## 2022-10-27 PROCEDURE — 96372 THER/PROPH/DIAG INJ SC/IM: CPT

## 2022-10-27 PROCEDURE — 1160F PR REVIEW ALL MEDS BY PRESCRIBER/CLIN PHARMACIST DOCUMENTED: ICD-10-PCS | Mod: CPTII,S$GLB,, | Performed by: PHYSICIAN ASSISTANT

## 2022-10-27 PROCEDURE — 72050 X-RAY EXAM NECK SPINE 4/5VWS: CPT | Mod: 26,,, | Performed by: RADIOLOGY

## 2022-10-27 PROCEDURE — 1101F PR PT FALLS ASSESS DOC 0-1 FALLS W/OUT INJ PAST YR: ICD-10-PCS | Mod: CPTII,S$GLB,, | Performed by: PHYSICIAN ASSISTANT

## 2022-10-27 PROCEDURE — 3288F PR FALLS RISK ASSESSMENT DOCUMENTED: ICD-10-PCS | Mod: CPTII,S$GLB,, | Performed by: PHYSICIAN ASSISTANT

## 2022-10-27 PROCEDURE — 99214 PR OFFICE/OUTPT VISIT, EST, LEVL IV, 30-39 MIN: ICD-10-PCS | Mod: S$GLB,,, | Performed by: PHYSICIAN ASSISTANT

## 2022-10-27 PROCEDURE — 1159F PR MEDICATION LIST DOCUMENTED IN MEDICAL RECORD: ICD-10-PCS | Mod: CPTII,S$GLB,, | Performed by: PHYSICIAN ASSISTANT

## 2022-10-27 PROCEDURE — 72050 XR CERVICAL SPINE AP LAT WITH FLEX EXTEN: ICD-10-PCS | Mod: 26,,, | Performed by: RADIOLOGY

## 2022-10-27 PROCEDURE — 99214 OFFICE O/P EST MOD 30 MIN: CPT | Mod: S$GLB,,, | Performed by: PHYSICIAN ASSISTANT

## 2022-10-27 RX ADMIN — EPOETIN ALFA-EPBX 20000 UNITS: 20000 INJECTION, SOLUTION INTRAVENOUS; SUBCUTANEOUS at 11:10

## 2022-10-27 NOTE — PROGRESS NOTES
[facility]    Interval history 10/27/2022:   Patient returns to clinic today for evaluation of 1 month of worsening neck pain radiating to her b/l shoulders as well as up to her head.  She reports a tingling sensation on the right side of her neck and all of her fingers, left > right.  She denies any recent injuries.  CT cervical spine was completed today.      HPI from Dr. Conner November 2021  Ms. Regino Lawrence is a 76 y.o. woman with lumbar disc herniation with radiculopathy, lumbar DDD, lumbar stenosis, s/p posterior C3-C7 laminectomy and fusion on 11/16/2015, who presents today for 3 month follow up with CT cervical and thoracic spine. The pt was last seen by me on 7/28/2021, after hospitalization for syncopal event and fall. She had a workup at that time that shows that she had a subacute burst fracture without any retropulsion at T4.  Today the pt reports she has been doing well in the interim. Denies any symptoms at this point and is improving well. The pt ambulates in an electric scooter and presented in a cervical collar and with walking cane.     Review of Systems   Constitutional:  Negative for activity change, appetite change, fatigue, fever and unexpected weight change.   HENT:  Negative for facial swelling.    Eyes: Negative.    Respiratory: Negative.     Cardiovascular: Negative.    Gastrointestinal:  Negative for diarrhea, nausea and vomiting.   Endocrine: Negative.    Genitourinary: Negative.    Musculoskeletal:  Negative for back pain, joint swelling, myalgias and neck pain.   Neurological:  Negative for dizziness, seizures, weakness, numbness and headaches.   Psychiatric/Behavioral: Negative.      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, without long-term current use of insulin     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, without long-term current use of insulin     Depression     Diabetes mellitus, type 2     Eye injury as a child     k-abrasion  od    Hyperlipidemia     Hypertension     Hypothyroidism     Immune deficiency disorder     Immune disorder     LOC (loss of consciousness) 03/12/2021    at home    Myalgia and myositis 9/6/2012    Osteoporosis     Polyneuropathy     Pulmonary embolism 7/10/2021    Renal manifestation of secondary diabetes mellitus     Sarcoidosis     Seizure     Type 2 diabetes mellitus     Ulcer     no cancer    Urinary incontinence        Objective:      Vitals:    10/27/22 1154   BP: (!) 174/89   Pulse: 100   SpO2:  96% this morning      Physical exam  General: well developed, well nourished, no distress  Neurologic: Alert and oriented. Thought content appropriate.  GCS: Motor: 6/Verbal: 5/Eyes: 4 GCS Total: 15  Cranial nerves: II-XII grossly intact  Neck: supple, without obvious masses or lesions  Skin: grossly intact in all 4 extremities without obvious rashes or lesions  Respiratory: Extremely short of breath today.  She states it is related to her anemia and does not feel the need to go to the ER  Gait - presents in a motorized scooter    Motor Strength: No focal numbness or weakness  Strength  Deltoids Triceps Biceps Wrist Extension Wrist Flexion Hand    Upper: R 5/5 5/5 5/5 5/5 5/5 5/5    L 5/5 5/5 5/5 5/5 5/5 5/5     Iliopsoas        Lower: R 5/5         L 5/5        Sensory: intact to light touch B/L UE and LE    Silverman's - Negative             IMAGING:  I have personally reviewed imaging and agree with the findings.     CT cervical spine 10/27/2022:    There is chronic postoperative and degenerative changes as above without change from the prior study and without complication.     X-rays cervical spine with flexion-extension  views 10/27/2022:   Chronic change as above.  No acute process seen and no instability seen.     Postoperative change without complication.       CT Thoracic Spine WO Contrast (11/10/2021): Redemonstration of T1 and T4 vertebral body compression fractures, similar to prior examinations.  No severe spinal canal stenosis or high-grade neural foraminal narrowing.    CT Cervical Spine WO Contrast (11/10/2021): Postoperative changes of posterior C3-C7 that is fused through with proper hardware placement and alignment.    Assessment:   Acute neck pain    Status post cervical spinal fusion   Subluxation of cervical spine, chronic  Plan:   Regino Lawrence is a 76-year-old female who presents to clinic today for evaluation acute neck pain radiating to her b/l shoulders as well as up the back of her head.  CT cervical spine reviewed today and shows no acute changes compared to 2021 imaging.  X-rays of the neck taken today showed no instability at the C2-3 level where subluxation is chronically present.    No acute surgical intervention indicated at this time   I will defer to primary care for management p.r.n. pain medication.  She reports multiple safety concerns related to anti-inflammatories.      Katarina Pozo PA-C  [facility]  Department of Neurosurgery  [phone]

## 2022-10-27 NOTE — Clinical Note
Please call patient, no instability seen on her x-rays.  She should make an appointment with Dr. Richards in pain management to see if he has any injections or procedure he could offer her Thanks,  Katarina

## 2022-10-27 NOTE — Clinical Note
I saw Ms. Lawrence yesterday.  I am not really sure where to go from this visit.  I ordered x-rays to rule out instability.  Her CT was stable.  Her primary complaint was of severe neck pain radiating into her shoulders and up the back of her head.  It looks like she was well established with pain management, so I will recommend she follow-up with Dr. Richards.  Grossly intact on exam and does not appear healthy enough to consider nonemergent elective surgeries.  Let me know if you would recommend anything else for her  Katarina

## 2022-10-27 NOTE — PLAN OF CARE
Patient arrived to unit for Retacrit 20,000u injection. Labs reviewed, hgb 10.3 today.Plan of care reviewed, patient agreeable to plan. Patient tolerated injection well. VSS. Discharge instructions reviewed, patient instructed to return 11/11. Patient left off unit accompanied by daughter. Patient in NAD at time of discharge.

## 2022-11-07 ENCOUNTER — HOSPITAL ENCOUNTER (OUTPATIENT)
Dept: RADIOLOGY | Facility: CLINIC | Age: 77
Discharge: HOME | End: 2022-11-07
Attending: FAMILY MEDICINE
Payer: MEDICARE

## 2022-11-07 DIAGNOSIS — Z78.0 MENOPAUSE: ICD-10-CM

## 2022-11-07 PROCEDURE — 77080 DEXA BONE DENSITY SPINE HIP: ICD-10-PCS | Mod: 26,,, | Performed by: INTERNAL MEDICINE

## 2022-11-07 PROCEDURE — 77080 DXA BONE DENSITY AXIAL: CPT | Mod: TC

## 2022-11-07 PROCEDURE — 77080 DXA BONE DENSITY AXIAL: CPT | Mod: 26,,, | Performed by: INTERNAL MEDICINE

## 2022-11-08 ENCOUNTER — LAB VISIT (OUTPATIENT)
Dept: LAB | Facility: HOSPITAL | Age: 77
End: 2022-11-08
Attending: INTERNAL MEDICINE
Payer: MEDICARE

## 2022-11-08 ENCOUNTER — PATIENT MESSAGE (OUTPATIENT)
Dept: RHEUMATOLOGY | Facility: CLINIC | Age: 77
End: 2022-11-08
Payer: MEDICARE

## 2022-11-08 ENCOUNTER — OFFICE VISIT (OUTPATIENT)
Dept: RHEUMATOLOGY | Facility: CLINIC | Age: 77
End: 2022-11-08
Payer: MEDICARE

## 2022-11-08 VITALS
DIASTOLIC BLOOD PRESSURE: 86 MMHG | HEIGHT: 63 IN | WEIGHT: 134 LBS | HEART RATE: 100 BPM | BODY MASS INDEX: 23.74 KG/M2 | SYSTOLIC BLOOD PRESSURE: 160 MMHG

## 2022-11-08 DIAGNOSIS — D86.9 SARCOIDOSIS: ICD-10-CM

## 2022-11-08 DIAGNOSIS — G72.9 MYOPATHY: ICD-10-CM

## 2022-11-08 DIAGNOSIS — R53.83 FATIGUE, UNSPECIFIED TYPE: ICD-10-CM

## 2022-11-08 DIAGNOSIS — M79.10 MYALGIA: ICD-10-CM

## 2022-11-08 DIAGNOSIS — T38.0X5A IMMUNOSUPPRESSION DUE TO CHRONIC STEROID USE: ICD-10-CM

## 2022-11-08 DIAGNOSIS — Z79.52 IMMUNOSUPPRESSION DUE TO CHRONIC STEROID USE: ICD-10-CM

## 2022-11-08 DIAGNOSIS — D86.86 SARCOID ARTHROPATHY: ICD-10-CM

## 2022-11-08 DIAGNOSIS — D86.9 SARCOIDOSIS: Primary | ICD-10-CM

## 2022-11-08 DIAGNOSIS — D84.821 IMMUNOSUPPRESSION DUE TO CHRONIC STEROID USE: ICD-10-CM

## 2022-11-08 LAB
ALBUMIN SERPL BCP-MCNC: 4.2 G/DL (ref 3.5–5.2)
ALP SERPL-CCNC: 59 U/L (ref 55–135)
ALT SERPL W/O P-5'-P-CCNC: 16 U/L (ref 10–44)
ANION GAP SERPL CALC-SCNC: 13 MMOL/L (ref 8–16)
AST SERPL-CCNC: 16 U/L (ref 10–40)
BILIRUB SERPL-MCNC: 0.6 MG/DL (ref 0.1–1)
BUN SERPL-MCNC: 15 MG/DL (ref 8–23)
CALCIUM SERPL-MCNC: 10 MG/DL (ref 8.7–10.5)
CHLORIDE SERPL-SCNC: 97 MMOL/L (ref 95–110)
CK SERPL-CCNC: 172 U/L (ref 20–180)
CO2 SERPL-SCNC: 25 MMOL/L (ref 23–29)
CREAT SERPL-MCNC: 1.3 MG/DL (ref 0.5–1.4)
CRP SERPL-MCNC: 5.4 MG/L (ref 0–8.2)
ERYTHROCYTE [SEDIMENTATION RATE] IN BLOOD BY PHOTOMETRIC METHOD: 29 MM/HR (ref 0–36)
EST. GFR  (NO RACE VARIABLE): 42.4 ML/MIN/1.73 M^2
GLUCOSE SERPL-MCNC: 118 MG/DL (ref 70–110)
MAGNESIUM SERPL-MCNC: 1.6 MG/DL (ref 1.6–2.6)
POTASSIUM SERPL-SCNC: 3.8 MMOL/L (ref 3.5–5.1)
PROT SERPL-MCNC: 8 G/DL (ref 6–8.4)
SODIUM SERPL-SCNC: 135 MMOL/L (ref 136–145)

## 2022-11-08 PROCEDURE — 1125F AMNT PAIN NOTED PAIN PRSNT: CPT | Mod: CPTII,S$GLB,, | Performed by: INTERNAL MEDICINE

## 2022-11-08 PROCEDURE — 99999 PR PBB SHADOW E&M-EST. PATIENT-LVL III: CPT | Mod: PBBFAC,,, | Performed by: INTERNAL MEDICINE

## 2022-11-08 PROCEDURE — 99214 OFFICE O/P EST MOD 30 MIN: CPT | Mod: 25,S$GLB,, | Performed by: INTERNAL MEDICINE

## 2022-11-08 PROCEDURE — 3072F PR LOW RISK FOR RETINOPATHY: ICD-10-PCS | Mod: CPTII,S$GLB,, | Performed by: INTERNAL MEDICINE

## 2022-11-08 PROCEDURE — 1157F ADVNC CARE PLAN IN RCRD: CPT | Mod: CPTII,S$GLB,, | Performed by: INTERNAL MEDICINE

## 2022-11-08 PROCEDURE — 1157F PR ADVANCE CARE PLAN OR EQUIV PRESENT IN MEDICAL RECORD: ICD-10-PCS | Mod: CPTII,S$GLB,, | Performed by: INTERNAL MEDICINE

## 2022-11-08 PROCEDURE — 3077F SYST BP >= 140 MM HG: CPT | Mod: CPTII,S$GLB,, | Performed by: INTERNAL MEDICINE

## 2022-11-08 PROCEDURE — 86140 C-REACTIVE PROTEIN: CPT | Performed by: INTERNAL MEDICINE

## 2022-11-08 PROCEDURE — 82085 ASSAY OF ALDOLASE: CPT | Performed by: INTERNAL MEDICINE

## 2022-11-08 PROCEDURE — 99999 PR PBB SHADOW E&M-EST. PATIENT-LVL III: ICD-10-PCS | Mod: PBBFAC,,, | Performed by: INTERNAL MEDICINE

## 2022-11-08 PROCEDURE — 82550 ASSAY OF CK (CPK): CPT | Performed by: INTERNAL MEDICINE

## 2022-11-08 PROCEDURE — 3072F LOW RISK FOR RETINOPATHY: CPT | Mod: CPTII,S$GLB,, | Performed by: INTERNAL MEDICINE

## 2022-11-08 PROCEDURE — 3079F PR MOST RECENT DIASTOLIC BLOOD PRESSURE 80-89 MM HG: ICD-10-PCS | Mod: CPTII,S$GLB,, | Performed by: INTERNAL MEDICINE

## 2022-11-08 PROCEDURE — 82164 ANGIOTENSIN I ENZYME TEST: CPT | Performed by: INTERNAL MEDICINE

## 2022-11-08 PROCEDURE — 80053 COMPREHEN METABOLIC PANEL: CPT | Performed by: INTERNAL MEDICINE

## 2022-11-08 PROCEDURE — 83735 ASSAY OF MAGNESIUM: CPT | Performed by: INTERNAL MEDICINE

## 2022-11-08 PROCEDURE — 96372 PR INJECTION,THERAP/PROPH/DIAG2ST, IM OR SUBCUT: ICD-10-PCS | Mod: S$GLB,,, | Performed by: INTERNAL MEDICINE

## 2022-11-08 PROCEDURE — 3077F PR MOST RECENT SYSTOLIC BLOOD PRESSURE >= 140 MM HG: ICD-10-PCS | Mod: CPTII,S$GLB,, | Performed by: INTERNAL MEDICINE

## 2022-11-08 PROCEDURE — 1125F PR PAIN SEVERITY QUANTIFIED, PAIN PRESENT: ICD-10-PCS | Mod: CPTII,S$GLB,, | Performed by: INTERNAL MEDICINE

## 2022-11-08 PROCEDURE — 96372 THER/PROPH/DIAG INJ SC/IM: CPT | Mod: S$GLB,,, | Performed by: INTERNAL MEDICINE

## 2022-11-08 PROCEDURE — 3079F DIAST BP 80-89 MM HG: CPT | Mod: CPTII,S$GLB,, | Performed by: INTERNAL MEDICINE

## 2022-11-08 PROCEDURE — 99214 PR OFFICE/OUTPT VISIT, EST, LEVL IV, 30-39 MIN: ICD-10-PCS | Mod: 25,S$GLB,, | Performed by: INTERNAL MEDICINE

## 2022-11-08 PROCEDURE — 85652 RBC SED RATE AUTOMATED: CPT | Performed by: INTERNAL MEDICINE

## 2022-11-08 RX ORDER — TRIAMCINOLONE ACETONIDE 40 MG/ML
80 INJECTION, SUSPENSION INTRA-ARTICULAR; INTRAMUSCULAR
Status: COMPLETED | OUTPATIENT
Start: 2022-11-08 | End: 2022-11-08

## 2022-11-08 RX ADMIN — TRIAMCINOLONE ACETONIDE 80 MG: 40 INJECTION, SUSPENSION INTRA-ARTICULAR; INTRAMUSCULAR at 11:11

## 2022-11-08 NOTE — PROGRESS NOTES
Subjective:       Patient ID: Regino Lawrence is a 77 y.o. female.    Chief Complaint: Sarcoidosis    HPI:  Regino Lawrence is a 77 y.o. female with history of sarcoidosis with associated myopathy and   arthropathy. Sarcoidosis that was first manifested by muscle inflammation, low white   blood cell count, hair loss, skin involvement. She was treated in the   past with methotrexate and Plaquenil, both of which were ineffective.   Cellcept and Imuran caused some unknown side effect (she thinks it made her sick).   Colchicine was held due to low WBC.   Although methotrexate did not help in past it was retried and she felt it helped hair growth but did not help body aches.   She held MTX due to an URI but patient has not wanted restarted since then (2013).   Leflunomide was held due to elevated BP after less than a week of use.  July 2021 hospitalized after passing out at home and fracturing L4 .  She was thought to have a seizure. During hospitalization found to have bilateral PE and was placed on Eliquis.      Interval History:   This week had severe episode of leg muscles spasm and twisting.  Lasted 15-20 minutes.    Hands have spasmed too.  No changes in meds, activity or diet.   Muscle relaxant did not help.   Last steroid injection helped for 2-3 months.   Puffy face and arms.   Pain 10/10 ache in entire body.  Activity worsens pain.  Steroid helps very little currently.  Patient requesting steroid injection.    Currently on prednisone on 5 mg daily.      .      Review of Systems   Constitutional:  Positive for fatigue. Negative for fever and unexpected weight change.   HENT:  Negative for mouth sores and trouble swallowing.         Dry mouth   Eyes:  Negative for redness.        Dry eyes   Respiratory: Negative.  Negative for cough and shortness of breath.    Cardiovascular: Negative.  Negative for chest pain.   Gastrointestinal: Negative.  Negative for constipation and diarrhea.   Endocrine: Negative.   "  Genitourinary: Negative.  Negative for dysuria and genital sores.   Musculoskeletal:  Positive for arthralgias, back pain, joint swelling and myalgias.   Skin: Negative.  Negative for rash.   Allergic/Immunologic: Negative.    Neurological: Negative.  Negative for headaches.   Hematological: Negative.  Does not bruise/bleed easily.   Psychiatric/Behavioral: Negative.         Objective:   BP (!) 160/86   Pulse 100   Ht 5' 3" (1.6 m)   Wt 60.8 kg (134 lb)   LMP  (LMP Unknown)   BMI 23.74 kg/m²   Pulse Ox 98% on RA     Physical Exam   Constitutional: She is oriented to person, place, and time.   HENT:   Head: Normocephalic and atraumatic.   Eyes: Conjunctivae are normal.   Cardiovascular: Normal rate, regular rhythm and normal heart sounds.   Pulmonary/Chest: Effort normal and breath sounds normal.   Abdominal: Soft. Bowel sounds are normal.   Musculoskeletal:      Comments: 4/5 UE and LE proximal strength bilaterally unchanged  28 joint count: 24 tender (MCPs, PIPs, knees and shoulders) and 2 swollen (2nd MCP bilateral)   Pain at left trochanteric bursitis    No pain on compression of MTPs        Neurological: She is alert and oriented to person, place, and time.   With cane   Skin: Skin is warm and dry.   Psychiatric: Mood and affect normal.   LABS      Component      Latest Ref Rng & Units 10/27/2022 6/13/2022 5/3/2022   WBC      3.90 - 12.70 K/uL 6.70  6.83   RBC      4.00 - 5.40 M/uL 3.09 (L)  3.56 (L)   Hemoglobin      12.0 - 16.0 g/dL 10.3 (L)  11.7 (L)   Hematocrit      37.0 - 48.5 % 31.2 (L)  36.8 (L)   MCV      82 - 98 fL 101 (H)  103 (H)   MCH      27.0 - 31.0 pg 33.3 (H)  32.9 (H)   MCHC      32.0 - 36.0 g/dL 33.0  31.8 (L)   RDW      11.5 - 14.5 % 14.1  13.9   Platelets      150 - 450 K/uL 289  383   MPV      9.2 - 12.9 fL 8.3 (L)  8.8 (L)   Immature Granulocytes      0.0 - 0.5 % 1.5 (H)  0.9 (H)   Gran # (ANC)      1.8 - 7.7 K/uL 4.7  4.7   Immature Grans (Abs)      0.00 - 0.04 K/uL 0.10 (H)  0.06 " (H)   Lymph #      1.0 - 4.8 K/uL 1.4  1.2   Mono #      0.3 - 1.0 K/uL 0.5  0.7   Eos #      0.0 - 0.5 K/uL 0.1  0.1   Baso #      0.00 - 0.20 K/uL 0.03  0.03   nRBC      0 /100 WBC 0  0   Gran %      38.0 - 73.0 % 69.7  69.2   Lymph %      18.0 - 48.0 % 20.1  18.2   Mono %      4.0 - 15.0 % 7.6  9.7   Eosinophil %      0.0 - 8.0 % 0.7  1.6   Basophil %      0.0 - 1.9 % 0.4  0.4   Differential Method       Automated  Automated   Sodium      136 - 145 mmol/L   138   Potassium      3.5 - 5.1 mmol/L   3.9   Chloride      95 - 110 mmol/L   101   CO2      23 - 29 mmol/L   25   Glucose      70 - 110 mg/dL   122 (H)   BUN      8 - 23 mg/dL   13   Creatinine      0.5 - 1.4 mg/dL   1.1   Calcium      8.7 - 10.5 mg/dL   10.4   PROTEIN TOTAL      6.0 - 8.4 g/dL   7.9   Albumin      3.5 - 5.2 g/dL   4.3   BILIRUBIN TOTAL      0.1 - 1.0 mg/dL   0.5   Alkaline Phosphatase      55 - 135 U/L   67   AST      10 - 40 U/L   19   ALT      10 - 44 U/L   15   Anion Gap      8 - 16 mmol/L   12   eGFR if African American      >60 mL/min/1.73 m:2   56.4 (A)   eGFR if non African American      >60 mL/min/1.73 m:2   48.9 (A)   Iron      30 - 160 ug/dL  74    Transferrin      200 - 375 mg/dL  335    TIBC      250 - 450 ug/dL  496 (H)    Saturated Iron      20 - 50 %  15 (L)    CPK      20 - 180 U/L   131   Aldolase      1.2 - 7.6 U/L   3.3   Sed Rate      0 - 36 mm/Hr   6   CRP      0.0 - 8.2 mg/L   4.1   Angio Convert Enzyme      16 - 85 U/L   51   Retic      0.5 - 2.5 %  2.9 (H)    Ferritin      20.0 - 300.0 ng/mL  139    Haptoglobin      30 - 250 mg/dL  184    TSH      0.400 - 4.000 uIU/mL  0.527    Free T4      0.71 - 1.51 ng/dL  1.53 (H)    T3, Total      60 - 180 ng/dL  70             Assessment:       1.   Sarcoidosis. Manifested by myopathy and arthropathy.  Persistent joint pain and myalgias despite prednisone 5 mg. Unable to tolerate immunosuppressants/steroid sparing agents for various reasons. Now with muscle spasms and all over  body pain.   2.   Myalgia and myositis.  History of fibromyalgia   3.   Osteopenia. Took Fosamax for 5 years stopped 6/2013.  Awaiting patient decision on Prolia after she sees dentist (she has not been able to go).    4.   Fatigue     5.   Diabetes mellitus type 2 in nonobese.    6.           Neck pain. X-ray with degenerative changes. S/p laminectomy-cervical fusion C3-C7 11/16/2015 for cervical spinal stenosis.    7.           Back pain    8.           HTN.  Patient suspects BP elevated due to pain  9.           SOB.  When blood count low.   10.         Anemia.  On Procrit  11.         Seizures.  New onset July 2021.  On chronic medication  12.         Bilateral PE    Plan:       1. Labs   2. Kenalog 80 mg IM.  Continue prednisone 5 mg daily.  Risk of elevated BP and BG discussed.  3. [employer] stopped tramadol.  Now off hydrocodone/acetaminophen from primary doctor.  [employer] stopped Flexeril so switch to tizanidine.  Tizanidine stopped after seizure but was restarted without any issues.   Continues to take seizure medication.  4. Following with nephrology  5. DXA with osteopenia of hip total and femoral neck. FRAX does not suggest treatment however if prednisone goes to 7.5 mg or more will consider Prolia (due renal insufficiency).  Information provided for patient to review.  She read information but did not see dentist to have teeth pulled.  6.  Follow with Dr. Conner regarding spine issues, fracture and pain in neck.  7.  Had COVID vaccine and booster                 RTO in 4 months.    Patient seen face to face for 25 minutes and greater than 50% spent in counseling regarding joint pains,   management of sarcoidosis and pain.

## 2022-11-08 NOTE — PROGRESS NOTES
Rapid3 Question Responses and Scores 11/7/2022   MDHAQ Score 1.6   Psychologic Score 5.5   Pain Score 10   When you awakened in the morning OVER THE LAST WEEK, did you feel stiff? Yes   If Yes, please indicate the number of hours until you are as limber as you will be for the day 1   Fatigue Score 10   Global Health Score 10   RAPID3 Score 8.44     Answers submitted by the patient for this visit:  Rheumatology Questionnaire (Submitted on 11/7/2022)  fever: No  eye redness: No  mouth sores: No  headaches: No  shortness of breath: Yes  chest pain: No  trouble swallowing: No  diarrhea: No  constipation: No  unexpected weight change: No  genital sore: No  dysuria: No  During the last 3 days, have you had a skin rash?: No  Bruises or bleeds easily: Yes  cough: No

## 2022-11-09 LAB — ACE SERPL-CCNC: 49 U/L (ref 16–85)

## 2022-11-11 ENCOUNTER — INFUSION (OUTPATIENT)
Dept: INFUSION THERAPY | Facility: HOSPITAL | Age: 77
End: 2022-11-11
Attending: INTERNAL MEDICINE
Payer: MEDICARE

## 2022-11-11 ENCOUNTER — LAB VISIT (OUTPATIENT)
Dept: LAB | Facility: HOSPITAL | Age: 77
End: 2022-11-11
Attending: INTERNAL MEDICINE
Payer: MEDICARE

## 2022-11-11 ENCOUNTER — OUTPATIENT CASE MANAGEMENT (OUTPATIENT)
Dept: ADMINISTRATIVE | Facility: OTHER | Age: 77
End: 2022-11-11
Payer: MEDICARE

## 2022-11-11 VITALS
RESPIRATION RATE: 16 BRPM | HEART RATE: 99 BPM | OXYGEN SATURATION: 96 % | SYSTOLIC BLOOD PRESSURE: 159 MMHG | DIASTOLIC BLOOD PRESSURE: 66 MMHG | TEMPERATURE: 98 F

## 2022-11-11 DIAGNOSIS — D63.1 ANEMIA IN CHRONIC KIDNEY DISEASE, UNSPECIFIED CKD STAGE: ICD-10-CM

## 2022-11-11 DIAGNOSIS — N18.9 ANEMIA IN CHRONIC KIDNEY DISEASE, UNSPECIFIED CKD STAGE: ICD-10-CM

## 2022-11-11 DIAGNOSIS — Z53.1 REFUSAL OF BLOOD TRANSFUSIONS AS PATIENT IS JEHOVAH'S WITNESS: ICD-10-CM

## 2022-11-11 DIAGNOSIS — N18.9 CKD (CHRONIC KIDNEY DISEASE): ICD-10-CM

## 2022-11-11 DIAGNOSIS — N18.30 ANEMIA IN STAGE 3 CHRONIC KIDNEY DISEASE, UNSPECIFIED WHETHER STAGE 3A OR 3B CKD: Primary | ICD-10-CM

## 2022-11-11 DIAGNOSIS — D63.1 ANEMIA IN STAGE 3 CHRONIC KIDNEY DISEASE, UNSPECIFIED WHETHER STAGE 3A OR 3B CKD: Primary | ICD-10-CM

## 2022-11-11 DIAGNOSIS — D50.9 IRON DEFICIENCY ANEMIA, UNSPECIFIED IRON DEFICIENCY ANEMIA TYPE: ICD-10-CM

## 2022-11-11 LAB
BASOPHILS # BLD AUTO: 0.02 K/UL (ref 0–0.2)
BASOPHILS NFR BLD: 0.3 % (ref 0–1.9)
DIFFERENTIAL METHOD: ABNORMAL
EOSINOPHIL # BLD AUTO: 0 K/UL (ref 0–0.5)
EOSINOPHIL NFR BLD: 0.4 % (ref 0–8)
ERYTHROCYTE [DISTWIDTH] IN BLOOD BY AUTOMATED COUNT: 13.7 % (ref 11.5–14.5)
HCT VFR BLD AUTO: 30.9 % (ref 37–48.5)
HGB BLD-MCNC: 10.7 G/DL (ref 12–16)
IMM GRANULOCYTES # BLD AUTO: 0.06 K/UL (ref 0–0.04)
IMM GRANULOCYTES NFR BLD AUTO: 0.9 % (ref 0–0.5)
LYMPHOCYTES # BLD AUTO: 1.4 K/UL (ref 1–4.8)
LYMPHOCYTES NFR BLD: 20.1 % (ref 18–48)
MCH RBC QN AUTO: 34.5 PG (ref 27–31)
MCHC RBC AUTO-ENTMCNC: 34.6 G/DL (ref 32–36)
MCV RBC AUTO: 100 FL (ref 82–98)
MONOCYTES # BLD AUTO: 0.6 K/UL (ref 0.3–1)
MONOCYTES NFR BLD: 8.6 % (ref 4–15)
NEUTROPHILS # BLD AUTO: 4.8 K/UL (ref 1.8–7.7)
NEUTROPHILS NFR BLD: 69.7 % (ref 38–73)
NRBC BLD-RTO: 0 /100 WBC
PLATELET # BLD AUTO: 274 K/UL (ref 150–450)
PMV BLD AUTO: 8.2 FL (ref 9.2–12.9)
RBC # BLD AUTO: 3.1 M/UL (ref 4–5.4)
WBC # BLD AUTO: 6.88 K/UL (ref 3.9–12.7)

## 2022-11-11 PROCEDURE — 36415 COLL VENOUS BLD VENIPUNCTURE: CPT | Performed by: INTERNAL MEDICINE

## 2022-11-11 PROCEDURE — 96372 THER/PROPH/DIAG INJ SC/IM: CPT

## 2022-11-11 PROCEDURE — 85025 COMPLETE CBC W/AUTO DIFF WBC: CPT | Performed by: INTERNAL MEDICINE

## 2022-11-11 PROCEDURE — 63600175 PHARM REV CODE 636 W HCPCS: Mod: JG | Performed by: INTERNAL MEDICINE

## 2022-11-11 RX ADMIN — EPOETIN ALFA-EPBX 20000 UNITS: 20000 INJECTION, SOLUTION INTRAVENOUS; SUBCUTANEOUS at 11:11

## 2022-11-11 NOTE — PLAN OF CARE
Pt received q2w RETAcrit, [id number]. Labs done prior to arrival. Hgb is 10.7 today. Pt tolerated injection well. Has next appointments. Discharged from unit using motor scooter.

## 2022-11-11 NOTE — PLAN OF CARE
Problem: Anemia  Goal: Anemia Symptom Improvement  Outcome: Ongoing, Progressing     Problem: Adjustment to Illness (Chronic Kidney Disease)  Goal: Optimal Coping with Chronic Illness  Outcome: Ongoing, Progressing     Problem: Electrolyte Imbalance (Chronic Kidney Disease)  Goal: Electrolyte Balance  Outcome: Ongoing, Progressing     Problem: Fluid Volume Excess (Chronic Kidney Disease)  Goal: Fluid Balance  Outcome: Ongoing, Progressing     Problem: Functional Decline (Chronic Kidney Disease)  Goal: Optimal Functional Ability  Outcome: Ongoing, Progressing     Problem: Hematologic Alteration (Chronic Kidney Disease)  Goal: Absence of Anemia Signs and Symptoms  Outcome: Ongoing, Progressing     Problem: Oral Intake Inadequate (Chronic Kidney Disease)  Goal: Optimal Oral Intake  Outcome: Ongoing, Progressing     Problem: Pain (Chronic Kidney Disease)  Goal: Acceptable Pain Control  Outcome: Ongoing, Progressing     Problem: Renal Function Impairment (Chronic Kidney Disease)  Goal: Minimize Renal Failure Effects  Outcome: Ongoing, Progressing

## 2022-11-11 NOTE — PROGRESS NOTES
Outpatient Care Management  Plan of Care Follow Up Visit    Patient: Regino Lawrence  MRN: [id number]  Date of Service: 11/11/2022  Completed by: Mirna Leos RN  Referral Date: 06/14/2022    Reason for Visit   Patient presents with    OPCM Chart Review     11/11/2022    Update Plan Of Care     11/11/2022       Brief Summary:    Brief contact with Regino who is tired, just arriving home from Retacrit injection.   She agrees to be contacted next week.     Patient Summary     Involvement of Care:  Do I have permission to speak with other family members about your care?       Patient Reported Labs & Vitals:  1.  Any Patient Reported Labs & Vitals?     2.  Patient Reported Blood Pressure:     3.  Patient Reported Pulse:     4.  Patient Reported Weight (Kg):     5.  Patient Reported Blood Glucose (mg/dl):       Medical and social history was reviewed with patient and/or caregiver.     Clinical Assessment     Reviewed and provided basic information on available community resources for mental health, transportation, wellness resources, and palliative care programs with patient and/or caregiver.     Complex Care Plan     Care plan was discussed and completed today with input from patient and/or caregiver.    Patient Instructions     Instructions were provided via the [email or web address] patient resources and are available for the patient to view on the patient portal.      Follow up in about 4 days (around 11/15/2022) for OPCM RN Follow Up to update care plan.    [facility] OPCM Self-Management Care Plan was developed with the patients/caregivers input and was based on identified barriers from todays assessment.  Goals were written today with the patient/caregiver and the patient has agreed to work towards these goals to improve his/her overall well-being. Patient verbalized understanding of the care plan, goals, and all of today's instructions. Encouraged patient/caregiver to communicate with his/her physician and health care team  about health conditions and the treatment plan.  Provided my contact information today and encouraged patient/caregiver to call me with any questions as needed.

## 2022-11-13 LAB — ALDOLASE SERPL-CCNC: 2.6 U/L (ref 1.2–7.6)

## 2022-11-14 ENCOUNTER — PATIENT MESSAGE (OUTPATIENT)
Dept: RHEUMATOLOGY | Facility: CLINIC | Age: 77
End: 2022-11-14
Payer: MEDICARE

## 2022-11-15 ENCOUNTER — PATIENT MESSAGE (OUTPATIENT)
Dept: ADMINISTRATIVE | Facility: OTHER | Age: 77
End: 2022-11-15
Payer: MEDICARE

## 2022-11-15 ENCOUNTER — OUTPATIENT CASE MANAGEMENT (OUTPATIENT)
Dept: ADMINISTRATIVE | Facility: OTHER | Age: 77
End: 2022-11-15
Payer: MEDICARE

## 2022-11-15 NOTE — LETTER
November 15, 2022    Regino Lawrence  [address] [address]             [facility] [address] [place] [address] Dear Ms. Regino Lawrence:    .  This is a note to you to make sure you have following phone numbers:    1) Nella Love, Patient Pharmacy Assistance Program/[facility]  TEL:  [phone]    2) Mirna Leos RN, Outpatient Care Management/[facility]  TEL:  [phone]    Victor M pascual,     MARQUEZ DavidsonN, RN, CCM [facility]  [email or web address]  TEL:  [phone]

## 2022-11-15 NOTE — PROGRESS NOTES
Outpatient Care Management  Plan of Care Follow Up Visit    Patient: Regino Lawrence  MRN: [id number]  Date of Service: 11/15/2022  Completed by: Mirna Leos RN  Referral Date: 06/14/2022    Reason for Visit   Patient presents with    OPCM Chart Review     11/15/2022    Update Plan Of Care     11/15/2022    Case Closure     11/15/2022       Brief Summary:     Completed Care Plan Goals for PULMONARY CARE PLAN FOR Sarcoidosis  Regino is aware of upcoming PULM appt 12/2/2022 and plans to attend.     Patient Summary     Involvement of Care:  Do I have permission to speak with other family members about your care?       Patient Reported Labs & Vitals:  1.  Any Patient Reported Labs & Vitals?     2.  Patient Reported Blood Pressure:     3.  Patient Reported Pulse:     4.  Patient Reported Weight (Kg):     5.  Patient Reported Blood Glucose (mg/dl):       Medical and social history was reviewed with patient and/or caregiver.     Clinical Assessment     Reviewed and provided basic information on available community resources for mental health, transportation, wellness resources, and palliative care programs with patient and/or caregiver.     Complex Care Plan     Care plan was discussed and completed today with input from patient and/or caregiver.    Patient Instructions     Instructions were provided via the [employer] patient resources and are available for the patient to view on the patient portal.        Todays OPCM Self-Management Care Plan was developed with the patients/caregivers input and was based on identified barriers from todays assessment.  Goals were written today with the patient/caregiver and the patient has agreed to work towards these goals to improve his/her overall well-being. Patient verbalized understanding of the care plan, goals, and all of today's instructions. Encouraged patient/caregiver to communicate with his/her physician and health care team about health conditions and the treatment plan.   Provided my contact information today and encouraged patient/caregiver to call me with any questions as needed.

## 2022-11-21 ENCOUNTER — PATIENT MESSAGE (OUTPATIENT)
Dept: FAMILY MEDICINE | Facility: CLINIC | Age: 77
End: 2022-11-21
Payer: MEDICARE

## 2022-11-23 DIAGNOSIS — M81.0 OSTEOPOROSIS WITHOUT CURRENT PATHOLOGICAL FRACTURE, UNSPECIFIED OSTEOPOROSIS TYPE: Primary | ICD-10-CM

## 2022-11-23 NOTE — TELEPHONE ENCOUNTER
"Instructions for placing an order through [facility]:       1) Create an Orders Only encounter in advance of the patient's scheduled visit for medication administration. The pre-service team needs advanced warning to gain authorization.   Within the orders only encounter, order "medication pre-authorization"     2) Enter the required fields, associate a diagnosis, and sign the order. Close the encounter.     3) An authorization (referral) will be generated to the pre-service team to be validated. You can check the status of the referral by going to Chart Review for the patient and clicking the Referrals tab. When generated it will show a status of "PEND". The status will update after the pre-service works the authorization.    If you have any other questions or need more guidance [facility] will be able to assist you.     [facility] [phone].       Patient's referral currently pending at this time*  "

## 2022-11-23 NOTE — TELEPHONE ENCOUNTER
I placed the orders, we will have to check with preservice to see if it was approved for us to give the medication     Micaela Mendoza MD

## 2022-11-25 ENCOUNTER — INFUSION (OUTPATIENT)
Dept: INFUSION THERAPY | Facility: HOSPITAL | Age: 77
End: 2022-11-25
Attending: INTERNAL MEDICINE
Payer: MEDICARE

## 2022-11-25 VITALS
SYSTOLIC BLOOD PRESSURE: 163 MMHG | HEART RATE: 95 BPM | OXYGEN SATURATION: 98 % | TEMPERATURE: 98 F | RESPIRATION RATE: 18 BRPM | DIASTOLIC BLOOD PRESSURE: 79 MMHG

## 2022-11-25 DIAGNOSIS — N18.30 ANEMIA IN STAGE 3 CHRONIC KIDNEY DISEASE, UNSPECIFIED WHETHER STAGE 3A OR 3B CKD: Primary | ICD-10-CM

## 2022-11-25 DIAGNOSIS — D63.1 ANEMIA IN CHRONIC KIDNEY DISEASE, UNSPECIFIED CKD STAGE: ICD-10-CM

## 2022-11-25 DIAGNOSIS — D63.1 ANEMIA IN STAGE 3 CHRONIC KIDNEY DISEASE, UNSPECIFIED WHETHER STAGE 3A OR 3B CKD: Primary | ICD-10-CM

## 2022-11-25 DIAGNOSIS — Z53.1 REFUSAL OF BLOOD TRANSFUSIONS AS PATIENT IS JEHOVAH'S WITNESS: ICD-10-CM

## 2022-11-25 DIAGNOSIS — N18.9 ANEMIA IN CHRONIC KIDNEY DISEASE, UNSPECIFIED CKD STAGE: ICD-10-CM

## 2022-11-25 DIAGNOSIS — D50.9 IRON DEFICIENCY ANEMIA, UNSPECIFIED IRON DEFICIENCY ANEMIA TYPE: ICD-10-CM

## 2022-11-25 PROCEDURE — 63600175 PHARM REV CODE 636 W HCPCS: Mod: JG | Performed by: INTERNAL MEDICINE

## 2022-11-25 PROCEDURE — 96372 THER/PROPH/DIAG INJ SC/IM: CPT

## 2022-11-25 RX ADMIN — EPOETIN ALFA-EPBX 20000 UNITS: 20000 INJECTION, SOLUTION INTRAVENOUS; SUBCUTANEOUS at 12:11

## 2022-11-25 NOTE — PLAN OF CARE
Problem: Fatigue  Goal: Improved Activity Tolerance  Outcome: Ongoing, Progressing     Problem: Asthma Comorbidity  Goal: Maintenance of Asthma Control  Outcome: Ongoing, Progressing     Problem: Behavioral Health Comorbidity  Goal: Maintenance of Behavioral Health Symptom Control  Outcome: Ongoing, Progressing     Problem: COPD (Chronic Obstructive Pulmonary Disease) Comorbidity  Goal: Maintenance of COPD Symptom Control  Outcome: Ongoing, Progressing     Problem: Heart Failure Comorbidity  Goal: Maintenance of Heart Failure Symptom Control  Outcome: Ongoing, Progressing     Problem: Hypertension Comorbidity  Goal: Blood Pressure in Desired Range  Outcome: Ongoing, Progressing     Problem: Obstructive Sleep Apnea Risk or Actual Comorbidity Management  Goal: Unobstructed Breathing During Sleep  Outcome: Ongoing, Progressing     Problem: Osteoarthritis Comorbidity  Goal: Maintenance of Osteoarthritis Symptom Control  Outcome: Ongoing, Progressing     Problem: Pain Chronic (Persistent) (Comorbidity Management)  Goal: Acceptable Pain Control and Functional Ability  Outcome: Ongoing, Progressing     Problem: Seizure Disorder Comorbidity  Goal: Maintenance of Seizure Control  Outcome: Ongoing, Progressing

## 2022-11-25 NOTE — PLAN OF CARE
Pt received q2w RETAcrit, [id number]. Labs done prior to arrival. Hgb is 10.3 today. Pt tolerated injection well. Has next appointments. Discharged from unit using motor scooter.

## 2022-11-28 ENCOUNTER — PATIENT MESSAGE (OUTPATIENT)
Dept: ADMINISTRATIVE | Facility: HOSPITAL | Age: 77
End: 2022-11-28
Payer: MEDICARE

## 2022-11-28 ENCOUNTER — PATIENT OUTREACH (OUTPATIENT)
Dept: ADMINISTRATIVE | Facility: HOSPITAL | Age: 77
End: 2022-11-28
Payer: MEDICARE

## 2022-11-28 DIAGNOSIS — Z12.31 BREAST CANCER SCREENING BY MAMMOGRAM: Primary | ICD-10-CM

## 2022-11-28 DIAGNOSIS — E11.3292 CONTROLLED TYPE 2 DIABETES MELLITUS WITH LEFT EYE AFFECTED BY MILD NONPROLIFERATIVE RETINOPATHY WITHOUT MACULAR EDEMA, WITHOUT LONG-TERM CURRENT USE OF INSULIN: Chronic | ICD-10-CM

## 2022-11-28 NOTE — PROGRESS NOTES
LVM for pt to call back and schedule a nurse BP visit, visit with PCP or give a home BP reading. BP reported updated. Immunization's updated.

## 2022-11-29 NOTE — PROGRESS NOTES
Subjective:     Reason for visit:  Follow-up of pulmonary sarcoidosis    Patient ID:  Regino Lawrence is a 77 y.o. female with CKD on EPO, DM 2, chronic pain and fatigue, seizure disorder, history of PE (2021) on apixaban and sarcoidosis with associated myopathy an arthropathy.     Diagnosed with sarcoid at [facility] in 1981. Previously on Plaquenil and methotrexate but both discontinued due to adverse effects.  Leflunomide was also attempted but held due to elevated blood pressure. Currently on prednisone 5 mg daily.     Patient not new to this clinic but new to me. Last seen by Dr Stauffer on 8/4/22.     Interval History:  Has dyspnea on exertion. Cannot complete ADLs without dyspnea. In bed most of the time, mostly because of arthritic pain. Uses scooter due to pain, not from dyspnea, per patient.     Feels like dyspnea has improved since her June admission.     Additional Pulmonary History:  Childhood Illnesses:  None  Occupational Exposures:  Retired, worked as   Environmental Exposures:  None  Tobacco/Smoking History:  Never  Travel History:  No out of [place] travel    Review of Systems   Constitutional:  Positive for malaise/fatigue ( chronic). Negative for chills, fever and weight loss.   HENT:  Positive for congestion (Chronic, seasonal.  Typically summer months). Negative for sinus pain.    Respiratory:  Negative for cough, sputum production, shortness of breath and wheezing.    Cardiovascular:  Negative for chest pain, palpitations, orthopnea and leg swelling.   Gastrointestinal:  Negative for abdominal pain, heartburn, nausea and vomiting.        Bloating   Musculoskeletal:  Positive for back pain (chronic) and joint pain.   Neurological:  Positive for weakness (generalized). Negative for dizziness.   Endo/Heme/Allergies:  Negative for environmental allergies.      Objective:     Vitals:    12/02/22 1508   BP: (!) 140/82   BP Location: Right arm   Patient Position: Sitting   BP Method: Medium  "(Manual)   Pulse: 106   SpO2: 97%   Weight: 61.4 kg (135 lb 5.8 oz)   Height: 5' 3" (1.6 m)           Physical Exam  Vitals and nursing note reviewed.   Constitutional:       General: She is not in acute distress.     Appearance: She is not ill-appearing, toxic-appearing or diaphoretic.      Comments: Sitting and powered mobility scooter   HENT:      Head: Normocephalic and atraumatic.      Nose: No rhinorrhea.      Mouth/Throat:      Mouth: Mucous membranes are moist.      Pharynx: Oropharynx is clear. No oropharyngeal exudate or posterior oropharyngeal erythema.   Eyes:      General: No scleral icterus.     Extraocular Movements: Extraocular movements intact.   Cardiovascular:      Rate and Rhythm: Regular rhythm. Tachycardia present.      Heart sounds: No murmur heard.  Pulmonary:      Effort: No tachypnea, accessory muscle usage, respiratory distress or retractions.      Breath sounds: No decreased breath sounds, wheezing, rhonchi or rales.   Abdominal:      General: There is distension (Mildly).      Palpations: Abdomen is soft.      Tenderness: There is no abdominal tenderness. There is no guarding.   Musculoskeletal:         General: No swelling.      Right lower leg: No edema.      Left lower leg: No edema.   Skin:     General: Skin is warm and dry.      Coloration: Skin is not jaundiced.      Findings: No rash.   Neurological:      General: No focal deficit present.      Mental Status: She is oriented to person, place, and time. Mental status is at baseline.      Cranial Nerves: No cranial nerve deficit.        Personal Diagnostic Review and Interpretation  06/13/2022 CTA chest:  Aortic calcifications.  Elevated right hemidiaphragm with RLL calcified granuloma.  No mediastinal or hilar lymphadenopathy.  No parenchymal disease.    07/09/2021 CTA chest:  No lymphadenopathy.  No obvious airspace disease or parenchymal abnormalities      Pertinent Studies Reviewed and Interpreted:     Pulmonary Function Tests: "   11/26/2019 PFT:  FEV1 100%, %.  TLC 73%.  DLCO 73%      Echocardiograms:   07/11/2021 echo:  LVEF 60% with concentric LVH, LAE.  G1 DD and PASP 27  8/15/22 TTE  The left ventricle is normal in size with concentric remodeling and normal systolic function.  The estimated ejection fraction is 68%.  Indeterminate left ventricular diastolic function.  Normal right ventricular size with normal right ventricular systolic function.  Mild tricuspid regurgitation.  Normal central venous pressure (3 mmHg).  The estimated PA systolic pressure is 45 mmHg.  There is at least mild pulmonary hypertension.  Trivial posterior pericardial effusion. And very trivial under the atria.  No PFO or shunt identified.    06/13/2022 BNP 30  02/23/2021   6/13/2022 BNP 30    Assessment:     1. Sarcoidosis  NEBULIZER FOR HOME USE    NEBULIZER KIT (SUPPLIES) FOR HOME USE    albuterol-ipratropium (DUO-NEB) 2.5 mg-0.5 mg/3 mL nebulizer solution      2. Multiple subsegmental pulmonary emboli without acute cor pulmonale        3. Shortness of breath            Current Outpatient Medications   Medication Instructions    ACCU-CHEK FASTCLIX LANCING DEV Kit USE AS DIRECTED.    albuterol-ipratropium (DUO-NEB) 2.5 mg-0.5 mg/3 mL nebulizer solution 3 mLs, Nebulization, Every 4 hours PRN, Rescue    ALCOHOL ANTISEPTIC PADS (ALCOHOL PREP PADS TOP) No dose, route, or frequency recorded.    apixaban (ELIQUIS) 5 mg, Oral, 2 times daily, Start this prescription after finishing starter pack    atorvastatin (LIPITOR) 20 mg, Oral, Daily    blood sugar diagnostic (ACCU-CHEK SMARTVIEW TEST STRIP) Strp Use as directed to check blood sugar twice daily.    blood sugar diagnostic Strp To check BG three times daily, to use with insurance preferred meter    blood-glucose meter kit Use as instructed    blood-glucose meter kit To check BG three times daily, to use with insurance preferred meter    brivaracetam (BRIVIACT) 75 mg, Oral, 2 times daily    calcium  "carbonate (OS-MALCOLM) 600 mg calcium (1,500 mg) Tab 1 tablet, Oral, 2 times daily    carvediloL (COREG) 25 mg, Oral, 2 times daily    DULoxetine (CYMBALTA) 20 mg, Oral, Daily    epoetin german-epbx (RETACRIT) 20,000 Units, Subcutaneous, Every 14 days    fenofibrate 160 mg, Oral, Daily    fluticasone propion-salmeterol 115-21 mcg/dose (ADVAIR HFA) 115-21 mcg/actuation HFAA inhaler 2 puffs, Inhalation, Every 12 hours, Controller    folic acid (FOLVITE) 1,000 mcg, Oral, Daily    hydrALAZINE (APRESOLINE) 25 MG tablet Take 2 tabs every 8 hours by mouth. Check BP 2 hours after each dose and if Blood pressure >160- please take 1 extra tab    insulin detemir U-100 (LEVEMIR FLEXTOUCH) 10 Units, Subcutaneous, Nightly    KLGA ketoprofen 10% LIDOcaine 10% gabapentin 6% amitriptyline 2% in transdermal cream Apply 1 to 2 grams 3 to 4 times daily    levETIRAcetam (KEPPRA) 250 mg, Oral, 2 times daily    levothyroxine (SYNTHROID) 50 mcg, Oral, Before breakfast    NIFEdipine (PROCARDIA-XL) 90 mg, Oral, Daily    olopatadine (PATANOL) 0.1 % ophthalmic solution 1 drop, Both Eyes, Daily PRN    pantoprazole (PROTONIX) 40 mg, Oral, Daily    pen needle, diabetic (NOVOFINE 32) 32 gauge x 1/4" Ndle For use with levermir pen .    potassium chloride SA (K-DUR,KLOR-CON) 20 MEQ tablet 20 mEq, Oral, 2 times daily    predniSONE (DELTASONE) 5 mg, Oral, Daily    tiZANidine (ZANAFLEX) 4 mg, Oral, Every 8 hours PRN      Mrs. Regino Lawrence had no medications administered during this visit.     Orders Placed This Encounter   Procedures    NEBULIZER FOR HOME USE     Order Specific Question:   Height:     Answer:   5' 3" (1.6 m)     Order Specific Question:   Weight:     Answer:   61.4 kg (135 lb 5.8 oz)     Order Specific Question:   Length of need (1-99 months):     Answer:   99    NEBULIZER KIT (SUPPLIES) FOR HOME USE     Order Specific Question:   Height:     Answer:   5' 3" (1.6 m)     Order Specific Question:   Weight:     Answer:   61.4 kg (135 lb 5.8 " oz)     Order Specific Question:   Length of need (1-99 months):     Answer:   99     Order Specific Question:   Mask or Mouthpiece?     Answer:   Mask        Plan:       Problem List Items Addressed This Visit          Pulmonary    Shortness of breath    Overview     Acute onset of shortness of breath in June of this year.  CTA negative for parenchymal findings or PE.  Reportedly was attributed to sarcoid, however does not appear to have received high-dose steroids to treat such.  Unclear etiology but seems to have been an acute process given its complete resolution.     Reported significant improvement with administration of Advair, so stands to reason it may have been some lingering reactive airways disease. Patient feels she has improved since initiation of ICS/LABA and reports benefit from nebulizer therapy.          Current Assessment & Plan     Although there was some concern for volume overload in the setting of diastolic dysfunction and elevated PASP, patient has had normal BNPs on serial serological studies.     Patient's primary concern is pain, not dyspnea, but continues to have trouble with her ADLs.     - continue advair  - add nebulizer therapy for added effect given reported clinical benefit during PFTs today  - no signs of volume overload, thus will not add any diuretics at this time            Immunology/Multi System    Sarcoidosis - Primary (Chronic)    Overview     Diagnosed  at [facility] in 1981 with systemic manifestations of myopathy and arthropathy. Previously on Plaquenil and methotrexate but both discontinued due to adverse effects.  Leflunomide was also attempted but held due to elevated blood pressure.  Follows with Rheumatology.  Currently on prednisone 5 mg daily.  No evidence of pulmonary involvement on serial CTs dating back for years, including LAD. 2019 PFT with mild restriction.         Current Assessment & Plan     Suspect mild restriction on PFTs is more related to chronic  debility, demonstrated it once again. Unlikely pulmonary sarcoid is her primary issue given she recovered without high dose steroids.     - continue 5mg prednisone         Relevant Medications    albuterol-ipratropium (DUO-NEB) 2.5 mg-0.5 mg/3 mL nebulizer solution    Other Relevant Orders    NEBULIZER FOR HOME USE    NEBULIZER KIT (SUPPLIES) FOR HOME USE       Hematology    Multiple subsegmental pulmonary emboli without acute cor pulmonale    Overview     Seen on 2021 CTA chest.  RML and RLL.  Currently on Eliquis.  Reports strict adherence         Current Assessment & Plan     Appears unprovoked.  Continue apixaban indefinitely             AUSTYN Aldridge MD  [employer] [facility] Fellow

## 2022-12-01 ENCOUNTER — PATIENT MESSAGE (OUTPATIENT)
Dept: FAMILY MEDICINE | Facility: CLINIC | Age: 77
End: 2022-12-01
Payer: MEDICARE

## 2022-12-02 ENCOUNTER — HOSPITAL ENCOUNTER (OUTPATIENT)
Dept: PULMONOLOGY | Facility: CLINIC | Age: 77
Discharge: HOME | End: 2022-12-02
Payer: MEDICARE

## 2022-12-02 ENCOUNTER — OFFICE VISIT (OUTPATIENT)
Dept: PULMONOLOGY | Facility: CLINIC | Age: 77
End: 2022-12-02
Payer: MEDICARE

## 2022-12-02 VITALS
HEIGHT: 63 IN | SYSTOLIC BLOOD PRESSURE: 140 MMHG | HEART RATE: 106 BPM | OXYGEN SATURATION: 97 % | DIASTOLIC BLOOD PRESSURE: 82 MMHG | WEIGHT: 135.38 LBS | BODY MASS INDEX: 23.99 KG/M2

## 2022-12-02 DIAGNOSIS — D86.9 SARCOIDOSIS: ICD-10-CM

## 2022-12-02 DIAGNOSIS — R06.02 SHORTNESS OF BREATH: ICD-10-CM

## 2022-12-02 DIAGNOSIS — D86.9 SARCOIDOSIS: Primary | Chronic | ICD-10-CM

## 2022-12-02 DIAGNOSIS — I26.94 MULTIPLE SUBSEGMENTAL PULMONARY EMBOLI WITHOUT ACUTE COR PULMONALE: ICD-10-CM

## 2022-12-02 PROCEDURE — 3072F PR LOW RISK FOR RETINOPATHY: ICD-10-PCS | Mod: CPTII,GC,S$GLB, | Performed by: STUDENT IN AN ORGANIZED HEALTH CARE EDUCATION/TRAINING PROGRAM

## 2022-12-02 PROCEDURE — 3072F LOW RISK FOR RETINOPATHY: CPT | Mod: CPTII,GC,S$GLB, | Performed by: STUDENT IN AN ORGANIZED HEALTH CARE EDUCATION/TRAINING PROGRAM

## 2022-12-02 PROCEDURE — 94060 PR EVAL OF BRONCHOSPASM: ICD-10-PCS | Mod: S$GLB,,, | Performed by: INTERNAL MEDICINE

## 2022-12-02 PROCEDURE — 99213 PR OFFICE/OUTPT VISIT, EST, LEVL III, 20-29 MIN: ICD-10-PCS | Mod: 25,GC,S$GLB, | Performed by: STUDENT IN AN ORGANIZED HEALTH CARE EDUCATION/TRAINING PROGRAM

## 2022-12-02 PROCEDURE — 94060 EVALUATION OF WHEEZING: CPT | Mod: S$GLB,,, | Performed by: INTERNAL MEDICINE

## 2022-12-02 PROCEDURE — 1159F PR MEDICATION LIST DOCUMENTED IN MEDICAL RECORD: ICD-10-PCS | Mod: CPTII,GC,S$GLB, | Performed by: STUDENT IN AN ORGANIZED HEALTH CARE EDUCATION/TRAINING PROGRAM

## 2022-12-02 PROCEDURE — 3079F DIAST BP 80-89 MM HG: CPT | Mod: CPTII,GC,S$GLB, | Performed by: STUDENT IN AN ORGANIZED HEALTH CARE EDUCATION/TRAINING PROGRAM

## 2022-12-02 PROCEDURE — 99499 RISK ADDL DX/OHS AUDIT: ICD-10-PCS | Mod: S$GLB,,, | Performed by: INTERNAL MEDICINE

## 2022-12-02 PROCEDURE — 1157F PR ADVANCE CARE PLAN OR EQUIV PRESENT IN MEDICAL RECORD: ICD-10-PCS | Mod: CPTII,GC,S$GLB, | Performed by: STUDENT IN AN ORGANIZED HEALTH CARE EDUCATION/TRAINING PROGRAM

## 2022-12-02 PROCEDURE — 3288F PR FALLS RISK ASSESSMENT DOCUMENTED: ICD-10-PCS | Mod: CPTII,GC,S$GLB, | Performed by: STUDENT IN AN ORGANIZED HEALTH CARE EDUCATION/TRAINING PROGRAM

## 2022-12-02 PROCEDURE — 1157F ADVNC CARE PLAN IN RCRD: CPT | Mod: CPTII,GC,S$GLB, | Performed by: STUDENT IN AN ORGANIZED HEALTH CARE EDUCATION/TRAINING PROGRAM

## 2022-12-02 PROCEDURE — 1125F PR PAIN SEVERITY QUANTIFIED, PAIN PRESENT: ICD-10-PCS | Mod: CPTII,GC,S$GLB, | Performed by: STUDENT IN AN ORGANIZED HEALTH CARE EDUCATION/TRAINING PROGRAM

## 2022-12-02 PROCEDURE — 94727 GAS DIL/WSHOT DETER LNG VOL: CPT | Mod: S$GLB,,, | Performed by: INTERNAL MEDICINE

## 2022-12-02 PROCEDURE — 94729 PR C02/MEMBANE DIFFUSE CAPACITY: ICD-10-PCS | Mod: S$GLB,,, | Performed by: INTERNAL MEDICINE

## 2022-12-02 PROCEDURE — 99999 PR PBB SHADOW E&M-EST. PATIENT-LVL IV: ICD-10-PCS | Mod: PBBFAC,GC,, | Performed by: STUDENT IN AN ORGANIZED HEALTH CARE EDUCATION/TRAINING PROGRAM

## 2022-12-02 PROCEDURE — 99213 OFFICE O/P EST LOW 20 MIN: CPT | Mod: 25,GC,S$GLB, | Performed by: STUDENT IN AN ORGANIZED HEALTH CARE EDUCATION/TRAINING PROGRAM

## 2022-12-02 PROCEDURE — 3079F PR MOST RECENT DIASTOLIC BLOOD PRESSURE 80-89 MM HG: ICD-10-PCS | Mod: CPTII,GC,S$GLB, | Performed by: STUDENT IN AN ORGANIZED HEALTH CARE EDUCATION/TRAINING PROGRAM

## 2022-12-02 PROCEDURE — 99499 UNLISTED E&M SERVICE: CPT | Mod: S$GLB,,, | Performed by: STUDENT IN AN ORGANIZED HEALTH CARE EDUCATION/TRAINING PROGRAM

## 2022-12-02 PROCEDURE — 99499 UNLISTED E&M SERVICE: CPT | Mod: S$GLB,,, | Performed by: INTERNAL MEDICINE

## 2022-12-02 PROCEDURE — 3288F FALL RISK ASSESSMENT DOCD: CPT | Mod: CPTII,GC,S$GLB, | Performed by: STUDENT IN AN ORGANIZED HEALTH CARE EDUCATION/TRAINING PROGRAM

## 2022-12-02 PROCEDURE — 1125F AMNT PAIN NOTED PAIN PRSNT: CPT | Mod: CPTII,GC,S$GLB, | Performed by: STUDENT IN AN ORGANIZED HEALTH CARE EDUCATION/TRAINING PROGRAM

## 2022-12-02 PROCEDURE — 3077F PR MOST RECENT SYSTOLIC BLOOD PRESSURE >= 140 MM HG: ICD-10-PCS | Mod: CPTII,GC,S$GLB, | Performed by: STUDENT IN AN ORGANIZED HEALTH CARE EDUCATION/TRAINING PROGRAM

## 2022-12-02 PROCEDURE — 99999 PR PBB SHADOW E&M-EST. PATIENT-LVL IV: CPT | Mod: PBBFAC,GC,, | Performed by: STUDENT IN AN ORGANIZED HEALTH CARE EDUCATION/TRAINING PROGRAM

## 2022-12-02 PROCEDURE — 1159F MED LIST DOCD IN RCRD: CPT | Mod: CPTII,GC,S$GLB, | Performed by: STUDENT IN AN ORGANIZED HEALTH CARE EDUCATION/TRAINING PROGRAM

## 2022-12-02 PROCEDURE — 1101F PR PT FALLS ASSESS DOC 0-1 FALLS W/OUT INJ PAST YR: ICD-10-PCS | Mod: CPTII,GC,S$GLB, | Performed by: STUDENT IN AN ORGANIZED HEALTH CARE EDUCATION/TRAINING PROGRAM

## 2022-12-02 PROCEDURE — 94727 PR PULM FUNCTION TEST BY GAS: ICD-10-PCS | Mod: S$GLB,,, | Performed by: INTERNAL MEDICINE

## 2022-12-02 PROCEDURE — 99499 RISK ADDL DX/OHS AUDIT: ICD-10-PCS | Mod: S$GLB,,, | Performed by: STUDENT IN AN ORGANIZED HEALTH CARE EDUCATION/TRAINING PROGRAM

## 2022-12-02 PROCEDURE — 1101F PT FALLS ASSESS-DOCD LE1/YR: CPT | Mod: CPTII,GC,S$GLB, | Performed by: STUDENT IN AN ORGANIZED HEALTH CARE EDUCATION/TRAINING PROGRAM

## 2022-12-02 PROCEDURE — 94729 DIFFUSING CAPACITY: CPT | Mod: S$GLB,,, | Performed by: INTERNAL MEDICINE

## 2022-12-02 PROCEDURE — 3077F SYST BP >= 140 MM HG: CPT | Mod: CPTII,GC,S$GLB, | Performed by: STUDENT IN AN ORGANIZED HEALTH CARE EDUCATION/TRAINING PROGRAM

## 2022-12-02 RX ORDER — IPRATROPIUM BROMIDE AND ALBUTEROL SULFATE 2.5; .5 MG/3ML; MG/3ML
3 SOLUTION RESPIRATORY (INHALATION) EVERY 4 HOURS PRN
Qty: 90 EACH | Refills: 11 | Status: SHIPPED | OUTPATIENT
Start: 2022-12-02 | End: 2024-03-18

## 2022-12-02 NOTE — PROGRESS NOTES
Pt with sarcoidosis presenting as myopathy, skin involvement causing hair loss presenting for follow up of concern for pulmonary sarcoid after admission for hypoxemic respiratory failure. Pt improved with dexamethasone, duonebs and was given a dose of levoquin. She was discharged the next day. PFTs showing no obstruction (although patient took Advair today, which could cause overestimation of ratio), mild restriction and very mildly decreased diffusion capacity. Echo with mild pulmonary hypertension. CT chest without changes that would be indicative of pulmonary sarcoid causing decrease in diffusion capacity so more likely due to pulmonary hypertension. Will need to follow up with repeat echo.   - continue Advair, start on PRN albuterol  - Last two BNPs have been normal. Pt more limited with pain than breathing.     Devendra Reagan MD  [facility]

## 2022-12-02 NOTE — ASSESSMENT & PLAN NOTE
Although there was some concern for volume overload in the setting of diastolic dysfunction and elevated PASP, patient has had normal BNPs on serial serological studies.     Patient's primary concern is pain, not dyspnea, but continues to have trouble with her ADLs.     - continue advair  - add nebulizer therapy for added effect given reported clinical benefit during PFTs today  - no signs of volume overload, thus will not add any diuretics at this time

## 2022-12-02 NOTE — ASSESSMENT & PLAN NOTE
Suspect mild restriction on PFTs is more related to chronic debility, demonstrated it once again. Unlikely pulmonary sarcoid is her primary issue given she recovered without high dose steroids.     - continue 5mg prednisone

## 2022-12-07 NOTE — PROGRESS NOTES
I have reviewed the notes, assessments, and/or procedures performed by Fellow, I concur with her/his documentation of Regino Lawrence. Please see same day note for further thoughts.

## 2022-12-09 ENCOUNTER — LAB VISIT (OUTPATIENT)
Dept: LAB | Facility: HOSPITAL | Age: 77
End: 2022-12-09
Attending: INTERNAL MEDICINE
Payer: MEDICARE

## 2022-12-09 DIAGNOSIS — N18.9 CKD (CHRONIC KIDNEY DISEASE): ICD-10-CM

## 2022-12-09 DIAGNOSIS — E11.3292 CONTROLLED TYPE 2 DIABETES MELLITUS WITH LEFT EYE AFFECTED BY MILD NONPROLIFERATIVE RETINOPATHY WITHOUT MACULAR EDEMA, WITHOUT LONG-TERM CURRENT USE OF INSULIN: Chronic | ICD-10-CM

## 2022-12-09 LAB
BASOPHILS # BLD AUTO: 0.03 K/UL (ref 0–0.2)
BASOPHILS NFR BLD: 0.5 % (ref 0–1.9)
CHOLEST SERPL-MCNC: 126 MG/DL (ref 120–199)
CHOLEST/HDLC SERPL: 2.3 {RATIO} (ref 2–5)
DIFFERENTIAL METHOD: ABNORMAL
EOSINOPHIL # BLD AUTO: 0.1 K/UL (ref 0–0.5)
EOSINOPHIL NFR BLD: 1 % (ref 0–8)
ERYTHROCYTE [DISTWIDTH] IN BLOOD BY AUTOMATED COUNT: 13.3 % (ref 11.5–14.5)
ESTIMATED AVG GLUCOSE: 103 MG/DL (ref 68–131)
HBA1C MFR BLD: 5.2 % (ref 4–5.6)
HCT VFR BLD AUTO: 32.7 % (ref 37–48.5)
HDLC SERPL-MCNC: 55 MG/DL (ref 40–75)
HDLC SERPL: 43.7 % (ref 20–50)
HGB BLD-MCNC: 11 G/DL (ref 12–16)
IMM GRANULOCYTES # BLD AUTO: 0.07 K/UL (ref 0–0.04)
IMM GRANULOCYTES NFR BLD AUTO: 1.2 % (ref 0–0.5)
LDLC SERPL CALC-MCNC: 60.6 MG/DL (ref 63–159)
LYMPHOCYTES # BLD AUTO: 1.3 K/UL (ref 1–4.8)
LYMPHOCYTES NFR BLD: 21.7 % (ref 18–48)
MCH RBC QN AUTO: 34.6 PG (ref 27–31)
MCHC RBC AUTO-ENTMCNC: 33.6 G/DL (ref 32–36)
MCV RBC AUTO: 103 FL (ref 82–98)
MONOCYTES # BLD AUTO: 0.5 K/UL (ref 0.3–1)
MONOCYTES NFR BLD: 7.9 % (ref 4–15)
NEUTROPHILS # BLD AUTO: 4 K/UL (ref 1.8–7.7)
NEUTROPHILS NFR BLD: 67.7 % (ref 38–73)
NONHDLC SERPL-MCNC: 71 MG/DL
NRBC BLD-RTO: 0 /100 WBC
PLATELET # BLD AUTO: 262 K/UL (ref 150–450)
PMV BLD AUTO: 8.2 FL (ref 9.2–12.9)
RBC # BLD AUTO: 3.18 M/UL (ref 4–5.4)
TRIGL SERPL-MCNC: 52 MG/DL (ref 30–150)
WBC # BLD AUTO: 5.84 K/UL (ref 3.9–12.7)

## 2022-12-09 PROCEDURE — 36415 COLL VENOUS BLD VENIPUNCTURE: CPT | Performed by: FAMILY MEDICINE

## 2022-12-09 PROCEDURE — 85025 COMPLETE CBC W/AUTO DIFF WBC: CPT | Performed by: INTERNAL MEDICINE

## 2022-12-09 PROCEDURE — 83036 HEMOGLOBIN GLYCOSYLATED A1C: CPT | Performed by: FAMILY MEDICINE

## 2022-12-09 PROCEDURE — 80061 LIPID PANEL: CPT | Performed by: FAMILY MEDICINE

## 2022-12-13 ENCOUNTER — PATIENT MESSAGE (OUTPATIENT)
Dept: FAMILY MEDICINE | Facility: CLINIC | Age: 77
End: 2022-12-13
Payer: MEDICARE

## 2022-12-19 NOTE — TELEPHONE ENCOUNTER
Neena Mendoza, Ms Lacy did get approved to receive the Prolia at the clinic, but I noticed it was only authorized for one. I see that the referral placed requested for 1, but I know for previous orders I've seen had requested for 2 visits. I think another referral may need to be placed so that she can have a total of 2 visits?

## 2022-12-20 ENCOUNTER — OFFICE VISIT (OUTPATIENT)
Dept: HEMATOLOGY/ONCOLOGY | Facility: CLINIC | Age: 77
End: 2022-12-20
Payer: MEDICARE

## 2022-12-20 VITALS
BODY MASS INDEX: 20.21 KG/M2 | WEIGHT: 109.81 LBS | SYSTOLIC BLOOD PRESSURE: 170 MMHG | DIASTOLIC BLOOD PRESSURE: 86 MMHG | OXYGEN SATURATION: 98 % | HEART RATE: 91 BPM | HEIGHT: 62 IN

## 2022-12-20 DIAGNOSIS — N18.9 ANEMIA IN CHRONIC KIDNEY DISEASE, UNSPECIFIED CKD STAGE: Primary | ICD-10-CM

## 2022-12-20 DIAGNOSIS — D50.9 IRON DEFICIENCY ANEMIA, UNSPECIFIED IRON DEFICIENCY ANEMIA TYPE: ICD-10-CM

## 2022-12-20 DIAGNOSIS — D63.1 ANEMIA IN CHRONIC KIDNEY DISEASE, UNSPECIFIED CKD STAGE: Primary | ICD-10-CM

## 2022-12-20 DIAGNOSIS — N18.9 CHRONIC KIDNEY DISEASE, UNSPECIFIED CKD STAGE: ICD-10-CM

## 2022-12-20 PROCEDURE — 1157F PR ADVANCE CARE PLAN OR EQUIV PRESENT IN MEDICAL RECORD: ICD-10-PCS | Mod: CPTII,S$GLB,, | Performed by: INTERNAL MEDICINE

## 2022-12-20 PROCEDURE — 1125F AMNT PAIN NOTED PAIN PRSNT: CPT | Mod: CPTII,S$GLB,, | Performed by: INTERNAL MEDICINE

## 2022-12-20 PROCEDURE — 3288F FALL RISK ASSESSMENT DOCD: CPT | Mod: CPTII,S$GLB,, | Performed by: INTERNAL MEDICINE

## 2022-12-20 PROCEDURE — 1101F PR PT FALLS ASSESS DOC 0-1 FALLS W/OUT INJ PAST YR: ICD-10-PCS | Mod: CPTII,S$GLB,, | Performed by: INTERNAL MEDICINE

## 2022-12-20 PROCEDURE — 1101F PT FALLS ASSESS-DOCD LE1/YR: CPT | Mod: CPTII,S$GLB,, | Performed by: INTERNAL MEDICINE

## 2022-12-20 PROCEDURE — 3077F PR MOST RECENT SYSTOLIC BLOOD PRESSURE >= 140 MM HG: ICD-10-PCS | Mod: CPTII,S$GLB,, | Performed by: INTERNAL MEDICINE

## 2022-12-20 PROCEDURE — 3079F DIAST BP 80-89 MM HG: CPT | Mod: CPTII,S$GLB,, | Performed by: INTERNAL MEDICINE

## 2022-12-20 PROCEDURE — 99214 OFFICE O/P EST MOD 30 MIN: CPT | Mod: S$GLB,,, | Performed by: INTERNAL MEDICINE

## 2022-12-20 PROCEDURE — 99999 PR PBB SHADOW E&M-EST. PATIENT-LVL V: CPT | Mod: PBBFAC,,, | Performed by: INTERNAL MEDICINE

## 2022-12-20 PROCEDURE — 3072F LOW RISK FOR RETINOPATHY: CPT | Mod: CPTII,S$GLB,, | Performed by: INTERNAL MEDICINE

## 2022-12-20 PROCEDURE — 99999 PR PBB SHADOW E&M-EST. PATIENT-LVL V: ICD-10-PCS | Mod: PBBFAC,,, | Performed by: INTERNAL MEDICINE

## 2022-12-20 PROCEDURE — 99214 PR OFFICE/OUTPT VISIT, EST, LEVL IV, 30-39 MIN: ICD-10-PCS | Mod: S$GLB,,, | Performed by: INTERNAL MEDICINE

## 2022-12-20 PROCEDURE — 3079F PR MOST RECENT DIASTOLIC BLOOD PRESSURE 80-89 MM HG: ICD-10-PCS | Mod: CPTII,S$GLB,, | Performed by: INTERNAL MEDICINE

## 2022-12-20 PROCEDURE — 1125F PR PAIN SEVERITY QUANTIFIED, PAIN PRESENT: ICD-10-PCS | Mod: CPTII,S$GLB,, | Performed by: INTERNAL MEDICINE

## 2022-12-20 PROCEDURE — 1157F ADVNC CARE PLAN IN RCRD: CPT | Mod: CPTII,S$GLB,, | Performed by: INTERNAL MEDICINE

## 2022-12-20 PROCEDURE — 3288F PR FALLS RISK ASSESSMENT DOCUMENTED: ICD-10-PCS | Mod: CPTII,S$GLB,, | Performed by: INTERNAL MEDICINE

## 2022-12-20 PROCEDURE — 3077F SYST BP >= 140 MM HG: CPT | Mod: CPTII,S$GLB,, | Performed by: INTERNAL MEDICINE

## 2022-12-20 PROCEDURE — 3072F PR LOW RISK FOR RETINOPATHY: ICD-10-PCS | Mod: CPTII,S$GLB,, | Performed by: INTERNAL MEDICINE

## 2022-12-20 NOTE — Clinical Note
CBC q2wks STANDING Cont EPO  inj q 2wks( pending lab parameters) F/u in 3mos with cbc, Fe studies prior to f/u

## 2022-12-20 NOTE — PROGRESS NOTES
Subjective:        Patient ID: Regino Lawrence is a 77 y.o. female.    Chief Complaint: Follow-up anemia      Diagnosis: Anemia in CKD  Patient is a V[employer]  .  Prior Hx;  The patient is seen today for f/u  chronic anemia in CKD undergoing EPO injections.The patient reports that she has been diagnosed with JOLLY in the past.  She has been on oral iron supplementation therapy, but could not tolerate or did not respond, she is uncertainShe also has  undergone intermittent IV iron therapy.  She is followed by GI and has undergone a colonoscopy earlier this year and was diagnosed with hemorrhoids for which she underwent banding procedure.  No melena, hematochezia,change in bowel habits.  She has also been diagnosed with B12 deficiency in the past, but reports she did not respond to B12 injections. No history of blood transfusions.  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  She reports that she remembers getting injections in the 1970s when her blood count was low.   She is followed by Rheumatology for history of sarcoidosis with associated myopathy and arthropathy. She has been treated in the  past with methotrexate and Plaquenil, both of which were ineffectiveCellcept and imuran caused some unknown side effect. She is followed by PCP for DM        Interval Hx;   She was admitted 6/2022 with worsening SOB   Symptoms improved with 02  She doesn't feel good  She continues with Chronic diffuse arthralgias worse in cold weather  Followed by pain management and Rheumatology   In [facility]  She is undergoing epo inj q 2wks  Hb  11g/dL on 12/9/22  No CP/cough  Chronic Mild fatigue  Mild FAUSTIN-stable  No melena, hematochezia or change in bowel habits  She also has  undergone intermittent IV iron therapy  She also has history of cervical spinal stenosis s/p posterior C3-C7 laminectomy and fusion on 11/16/15 by Dr. Conner.  She has received COVD vaccination       PAST MEDICAL HISTORY:  Acid reflux, alopecia, anemia, anxiety  "disorder, chronic  kidney disease, depression, diabetes mellitus type 2, hyperlipidemia,  hypertension, hypothyroidism, osteoporosis, sarcoidosis.    PAST SURGICAL HISTORY:  Cholecystectomy, , tubal ligation, carpal tunnel release, cataracts.    FAMILY HISTORY: Unremarkable for cancer. Significant for HTN.       Review of Systems   Constitutional:  Positive for fatigue (chronic, mild). Negative for activity change and appetite change.   HENT:  Negative for hearing loss and nosebleeds.    Eyes:  Negative for visual disturbance.   Respiratory:  Negative for cough and shortness of breath.    Cardiovascular:  Negative for chest pain and leg swelling.   Gastrointestinal:  Negative for abdominal pain, constipation, diarrhea and nausea.   Genitourinary:  Negative for flank pain and urgency.   Musculoskeletal:  Positive for arthralgias and back pain. Negative for gait problem and joint swelling.   Skin:  Negative for rash.        No petechiae, ecchymoses   Neurological:  Negative for weakness, light-headedness and headaches.   Hematological:  Negative for adenopathy. Does not bruise/bleed easily.     Objective:         Vitals:    22 1538 22 1550   BP: (!) 170/86    BP Location: Left arm    Patient Position: Sitting    BP Method: Large (Automatic)    Pulse: 105 91   SpO2: 95% 98%   Weight: 61.7 kg (136 lb 0.4 oz) 49.8 kg (109 lb 12.6 oz)   Height: 5' 2" (1.575 m)        Vitals:    22 1538 22 1550   BP: (!) 170/86    BP Location: Left arm    Patient Position: Sitting    BP Method: Large (Automatic)    Pulse: 105 91   SpO2: 95% 98%   Weight: 61.7 kg (136 lb 0.4 oz) 49.8 kg (109 lb 12.6 oz)   Height: 5' 2" (1.575 m)            .Physical Exam   Constitutional: She is oriented to person, place, and time. She appears well-developed and well-nourished.   HENT:   Head: Normocephalic.   Eyes: Conjunctivae and lids are normal.No scleral icterus.   Neck: Normal range of motion. Neck supple. No " thyromegaly present.   Cardiovascular: Normal rate, regular rhythm and normal heart sounds.    No murmur heard.  Pulmonary/Chest: Breath sounds normal. She has no wheezes. She has no rales.   Abdominal: Soft. Bowel sounds are normal. There is no tenderness. There is no rebound and no guarding.   Musculoskeletal: Ambulates w/assistance of motorized scooter  Neurological: She is alert and oriented to person, place, and time. No cranial nerve deficit.  Skin: Skin is warm and dry. No ecchymosis, no petechiae and no rash noted. No erythema.   Psychiatric: She has a normal mood and affect.               Lab Results   Component Value Date    WBC 6.88 12/23/2022    HGB 10.7 (L) 12/23/2022    HCT 31.9 (L) 12/23/2022     (H) 12/23/2022     12/23/2022     Lab Results   Component Value Date    IRON 79 09/09/2022    IRON 79 09/09/2022    TIBC 447 09/09/2022    FERRITIN 98 09/09/2022     SPEP-nl      CT renal 3/6/2017   IMPRESSION:  1.  No renal, ureteral or bladder calculi.  No hydronephrosis or ureterectasis.  2.  Poorly distended bladder.  Mild bladder wall prominence.  Mild cystitis cannot be   excluded.  3.  Moderate constipation.  Normal appendix      Lab Results   Component Value Date    IRON 79 09/09/2022    IRON 79 09/09/2022    TIBC 447 09/09/2022    FERRITIN 98 09/09/2022     Lab Results   Component Value Date    WBC 6.88 12/23/2022    HGB 10.7 (L) 12/23/2022    HCT 31.9 (L) 12/23/2022     (H) 12/23/2022     12/23/2022         Assessment:       1. Anemia in chronic kidney disease, unspecified CKD stage    2. Iron deficiency anemia, unspecified iron deficiency anemia type    3. Chronic kidney disease, unspecified CKD stage            Plan:   1.2.,3.,4.    Pt is a Jew and declines/not interested in blood and blood products due to Zoroastrian beliefs  She is undergoing epo inj q 2wks  Hb 11 g/dL on 12/9/22    Pt followed by Nephrology . It has been determined early CKD stage III,  suspect due to age-related renal nephron loss, along with possible diabetic nephropathy v. hypertensive nephrosclerosis  CBC q2wks STANDING  Cont EPO  inj q 2wks( pending lab parameters)  Continue periodic monitoring of Fe studies  According to KIDIGO guidelines patients with CKD , a  gfr , <60 cc/min and anemia, iron therapy is appropriate if the Tsat is < 30 and ferritin < 500 mg/dl.    Check Fe  Follow-up with PCP for med mgmt      CBC q2wks STANDING  Cont EPO  inj q 2wks( pending lab parameters)  F/u in 3mos with cbc, Fe studies prior to f/u       Referral to ED  Advance Care Planning     Power of   I previously initiated the process of advance care planning today and explained the importance of this process to the patient.  I introduced the concept of advance directives to the patient, as well. Then the patient received detailed information about the importance of designating a Health Care Power of  (HCPOA). She was also instructed to communicate with this person about their wishes for future healthcare, should she become sick and lose decision-making capacity. The patient has  previously appointed a HCPOA. Pt completed in 2015           CC: Micaela Mendoza M.D.

## 2022-12-22 NOTE — TELEPHONE ENCOUNTER
This may start the approval process all over again, I recommend going ahead and setting up her injection and cc me when its done so I can place another order    Micaela Mendoza MD

## 2022-12-23 ENCOUNTER — INFUSION (OUTPATIENT)
Dept: INFUSION THERAPY | Facility: HOSPITAL | Age: 77
End: 2022-12-23
Attending: INTERNAL MEDICINE
Payer: MEDICARE

## 2022-12-23 VITALS
HEART RATE: 107 BPM | TEMPERATURE: 98 F | SYSTOLIC BLOOD PRESSURE: 155 MMHG | RESPIRATION RATE: 18 BRPM | OXYGEN SATURATION: 95 % | DIASTOLIC BLOOD PRESSURE: 72 MMHG

## 2022-12-23 DIAGNOSIS — D63.1 ANEMIA IN STAGE 3 CHRONIC KIDNEY DISEASE, UNSPECIFIED WHETHER STAGE 3A OR 3B CKD: Primary | ICD-10-CM

## 2022-12-23 DIAGNOSIS — D63.1 ANEMIA IN CHRONIC KIDNEY DISEASE, UNSPECIFIED CKD STAGE: ICD-10-CM

## 2022-12-23 DIAGNOSIS — N18.9 ANEMIA IN CHRONIC KIDNEY DISEASE, UNSPECIFIED CKD STAGE: ICD-10-CM

## 2022-12-23 DIAGNOSIS — D50.9 IRON DEFICIENCY ANEMIA, UNSPECIFIED IRON DEFICIENCY ANEMIA TYPE: ICD-10-CM

## 2022-12-23 DIAGNOSIS — Z53.1 REFUSAL OF BLOOD TRANSFUSIONS AS PATIENT IS JEHOVAH'S WITNESS: ICD-10-CM

## 2022-12-23 DIAGNOSIS — N18.30 ANEMIA IN STAGE 3 CHRONIC KIDNEY DISEASE, UNSPECIFIED WHETHER STAGE 3A OR 3B CKD: Primary | ICD-10-CM

## 2022-12-23 PROCEDURE — 96372 THER/PROPH/DIAG INJ SC/IM: CPT

## 2022-12-23 PROCEDURE — 63600175 PHARM REV CODE 636 W HCPCS: Mod: JG,EC | Performed by: INTERNAL MEDICINE

## 2022-12-23 RX ADMIN — EPOETIN ALFA-EPBX 20000 UNITS: 10000 INJECTION, SOLUTION INTRAVENOUS; SUBCUTANEOUS at 11:12

## 2022-12-23 NOTE — PLAN OF CARE
Pt received q2w RETAcrit, 63536m. Labs done prior to. Hgb is 10.7 today. VSS. Pt tolerated injection well. Has next appointments. Discharged from unit using motor scooter.

## 2022-12-27 ENCOUNTER — TELEPHONE (OUTPATIENT)
Dept: FAMILY MEDICINE | Facility: CLINIC | Age: 77
End: 2022-12-27
Payer: MEDICARE

## 2022-12-27 ENCOUNTER — OFFICE VISIT (OUTPATIENT)
Dept: URGENT CARE | Facility: CLINIC | Age: 77
End: 2022-12-27
Payer: MEDICARE

## 2022-12-27 ENCOUNTER — TELEPHONE (OUTPATIENT)
Dept: URGENT CARE | Facility: CLINIC | Age: 77
End: 2022-12-27
Payer: MEDICARE

## 2022-12-27 ENCOUNTER — PATIENT MESSAGE (OUTPATIENT)
Dept: FAMILY MEDICINE | Facility: CLINIC | Age: 77
End: 2022-12-27
Payer: MEDICARE

## 2022-12-27 VITALS
RESPIRATION RATE: 17 BRPM | SYSTOLIC BLOOD PRESSURE: 160 MMHG | TEMPERATURE: 99 F | WEIGHT: 135 LBS | HEART RATE: 100 BPM | DIASTOLIC BLOOD PRESSURE: 82 MMHG | BODY MASS INDEX: 24.69 KG/M2 | OXYGEN SATURATION: 96 %

## 2022-12-27 DIAGNOSIS — J40 BRONCHITIS: ICD-10-CM

## 2022-12-27 DIAGNOSIS — R05.9 COUGH, UNSPECIFIED TYPE: ICD-10-CM

## 2022-12-27 DIAGNOSIS — U07.1 COVID-19 VIRUS INFECTION: Primary | ICD-10-CM

## 2022-12-27 LAB
CTP QC/QA: YES
SARS-COV-2 AG RESP QL IA.RAPID: POSITIVE

## 2022-12-27 PROCEDURE — 1159F PR MEDICATION LIST DOCUMENTED IN MEDICAL RECORD: ICD-10-PCS | Mod: CPTII,S$GLB,, | Performed by: NURSE PRACTITIONER

## 2022-12-27 PROCEDURE — 3072F PR LOW RISK FOR RETINOPATHY: ICD-10-PCS | Mod: CPTII,S$GLB,, | Performed by: NURSE PRACTITIONER

## 2022-12-27 PROCEDURE — 3079F PR MOST RECENT DIASTOLIC BLOOD PRESSURE 80-89 MM HG: ICD-10-PCS | Mod: CPTII,S$GLB,, | Performed by: NURSE PRACTITIONER

## 2022-12-27 PROCEDURE — 1157F ADVNC CARE PLAN IN RCRD: CPT | Mod: CPTII,S$GLB,, | Performed by: NURSE PRACTITIONER

## 2022-12-27 PROCEDURE — 1160F PR REVIEW ALL MEDS BY PRESCRIBER/CLIN PHARMACIST DOCUMENTED: ICD-10-PCS | Mod: CPTII,S$GLB,, | Performed by: NURSE PRACTITIONER

## 2022-12-27 PROCEDURE — 1125F AMNT PAIN NOTED PAIN PRSNT: CPT | Mod: CPTII,S$GLB,, | Performed by: NURSE PRACTITIONER

## 2022-12-27 PROCEDURE — 71046 X-RAY EXAM CHEST 2 VIEWS: CPT | Mod: S$GLB,,, | Performed by: RADIOLOGY

## 2022-12-27 PROCEDURE — 94640 AIRWAY INHALATION TREATMENT: CPT | Mod: S$GLB,,, | Performed by: NURSE PRACTITIONER

## 2022-12-27 PROCEDURE — U0002 SARS CORONAVIRUS 2 ANTIGEN POCT, MANUAL READ: ICD-10-PCS | Mod: QW,S$GLB,, | Performed by: NURSE PRACTITIONER

## 2022-12-27 PROCEDURE — 94640 PR INHAL RX, AIRWAY OBST/DX SPUTUM INDUCT: ICD-10-PCS | Mod: 59,S$GLB,, | Performed by: NURSE PRACTITIONER

## 2022-12-27 PROCEDURE — 1160F RVW MEDS BY RX/DR IN RCRD: CPT | Mod: CPTII,S$GLB,, | Performed by: NURSE PRACTITIONER

## 2022-12-27 PROCEDURE — 1159F MED LIST DOCD IN RCRD: CPT | Mod: CPTII,S$GLB,, | Performed by: NURSE PRACTITIONER

## 2022-12-27 PROCEDURE — 3077F PR MOST RECENT SYSTOLIC BLOOD PRESSURE >= 140 MM HG: ICD-10-PCS | Mod: CPTII,S$GLB,, | Performed by: NURSE PRACTITIONER

## 2022-12-27 PROCEDURE — 3077F SYST BP >= 140 MM HG: CPT | Mod: CPTII,S$GLB,, | Performed by: NURSE PRACTITIONER

## 2022-12-27 PROCEDURE — 1157F PR ADVANCE CARE PLAN OR EQUIV PRESENT IN MEDICAL RECORD: ICD-10-PCS | Mod: CPTII,S$GLB,, | Performed by: NURSE PRACTITIONER

## 2022-12-27 PROCEDURE — 3072F LOW RISK FOR RETINOPATHY: CPT | Mod: CPTII,S$GLB,, | Performed by: NURSE PRACTITIONER

## 2022-12-27 PROCEDURE — 99214 OFFICE O/P EST MOD 30 MIN: CPT | Mod: 25,S$GLB,CS, | Performed by: NURSE PRACTITIONER

## 2022-12-27 PROCEDURE — 71046 XR CHEST PA AND LATERAL: ICD-10-PCS | Mod: S$GLB,,, | Performed by: RADIOLOGY

## 2022-12-27 PROCEDURE — U0002 COVID-19 LAB TEST NON-CDC: HCPCS | Mod: QW,S$GLB,, | Performed by: NURSE PRACTITIONER

## 2022-12-27 PROCEDURE — 99214 PR OFFICE/OUTPT VISIT, EST, LEVL IV, 30-39 MIN: ICD-10-PCS | Mod: 25,S$GLB,CS, | Performed by: NURSE PRACTITIONER

## 2022-12-27 PROCEDURE — 1125F PR PAIN SEVERITY QUANTIFIED, PAIN PRESENT: ICD-10-PCS | Mod: CPTII,S$GLB,, | Performed by: NURSE PRACTITIONER

## 2022-12-27 PROCEDURE — 3079F DIAST BP 80-89 MM HG: CPT | Mod: CPTII,S$GLB,, | Performed by: NURSE PRACTITIONER

## 2022-12-27 RX ORDER — IPRATROPIUM BROMIDE AND ALBUTEROL SULFATE 2.5; .5 MG/3ML; MG/3ML
3 SOLUTION RESPIRATORY (INHALATION) EVERY 6 HOURS PRN
Qty: 45 ML | Refills: 0 | Status: SHIPPED | OUTPATIENT
Start: 2022-12-27 | End: 2023-01-26

## 2022-12-27 RX ORDER — BENZONATATE 100 MG/1
100 CAPSULE ORAL 3 TIMES DAILY PRN
Qty: 30 CAPSULE | Refills: 0 | Status: SHIPPED | OUTPATIENT
Start: 2022-12-27 | End: 2023-01-06

## 2022-12-27 RX ORDER — IPRATROPIUM BROMIDE 0.5 MG/2.5ML
0.5 SOLUTION RESPIRATORY (INHALATION)
Status: COMPLETED | OUTPATIENT
Start: 2022-12-27 | End: 2022-12-27

## 2022-12-27 RX ORDER — PROMETHAZINE HYDROCHLORIDE AND DEXTROMETHORPHAN HYDROBROMIDE 6.25; 15 MG/5ML; MG/5ML
5 SYRUP ORAL EVERY 4 HOURS PRN
Qty: 240 ML | Refills: 0 | Status: SHIPPED | OUTPATIENT
Start: 2022-12-27 | End: 2023-01-06

## 2022-12-27 RX ORDER — ALBUTEROL SULFATE 0.83 MG/ML
2.5 SOLUTION RESPIRATORY (INHALATION)
Status: COMPLETED | OUTPATIENT
Start: 2022-12-27 | End: 2022-12-27

## 2022-12-27 RX ORDER — FLUTICASONE PROPIONATE 50 MCG
2 SPRAY, SUSPENSION (ML) NASAL DAILY
Qty: 18.2 ML | Refills: 0 | Status: SHIPPED | OUTPATIENT
Start: 2022-12-27 | End: 2023-01-26

## 2022-12-27 RX ORDER — CETIRIZINE HYDROCHLORIDE 10 MG/1
10 TABLET ORAL DAILY
Qty: 30 TABLET | Refills: 0 | Status: SHIPPED | OUTPATIENT
Start: 2022-12-27 | End: 2024-03-18

## 2022-12-27 RX ADMIN — IPRATROPIUM BROMIDE 0.5 MG: 0.5 SOLUTION RESPIRATORY (INHALATION) at 05:12

## 2022-12-27 RX ADMIN — ALBUTEROL SULFATE 2.5 MG: 0.83 SOLUTION RESPIRATORY (INHALATION) at 05:12

## 2022-12-27 NOTE — PROGRESS NOTES
"Subjective:       Patient ID: Regino Lawrence is a 77 y.o. female.    Vitals:  weight is 61.2 kg (135 lb). Her temperature is 98.7 °F (37.1 °C). Her blood pressure is 160/82 (abnormal) and her pulse is 100. Her respiration is 17 and oxygen saturation is 96%.     Chief Complaint: Cough    78yo female pt presents with daughter.  Reports dry cough for over a week that is getting worse, reports no improvement with albuterol rescue inhaler or nebulizer treatments at home.  Denies fever/chills, denies n/v/d, denies chest pain, but reports SOB, especially with coughing "fits."  Denies known sick contacts.  Reports receiving both COVID and flu vaccinations.  Denies attempting any OTC medications for symptoms.    Cough  This is a new problem. The current episode started in the past 7 days. The problem has been unchanged. The problem occurs constantly. The cough is Non-productive. Associated symptoms include postnasal drip and shortness of breath. Pertinent negatives include no chest pain, chills, ear pain, fever, headaches, sore throat or wheezing. She has tried nothing for the symptoms. The treatment provided no relief.     Constitution: Negative for chills and fever.   HENT:  Positive for postnasal drip. Negative for ear pain, congestion and sore throat.    Cardiovascular:  Negative for chest pain.   Respiratory:  Positive for cough and shortness of breath. Negative for chest tightness, sputum production and wheezing.    Gastrointestinal:  Negative for nausea, vomiting and diarrhea.   Neurological:  Negative for headaches.     Objective:      Physical Exam   Constitutional: She is oriented to person, place, and time. She appears well-developed. She is cooperative.  Non-toxic appearance. She does not appear ill. No distress.   HENT:   Head: Normocephalic and atraumatic.   Ears:   Right Ear: Hearing, external ear and ear canal normal. Tympanic membrane is bulging. Tympanic membrane is not erythematous and not retracted. A " middle ear effusion (clear fluid) is present.   Left Ear: Hearing, external ear and ear canal normal. Tympanic membrane is bulging. Tympanic membrane is not erythematous and not retracted. A middle ear effusion (clear fluid) is present.   Nose: Mucosal edema (erythema to BL turbinates) and rhinorrhea (clear to BL nares) present. No purulent discharge or nasal deformity. No epistaxis. Right sinus exhibits no maxillary sinus tenderness and no frontal sinus tenderness. Left sinus exhibits no maxillary sinus tenderness and no frontal sinus tenderness.   Mouth/Throat: Uvula is midline and mucous membranes are normal. No trismus in the jaw. Normal dentition. No uvula swelling. Oropharyngeal exudate (clear postnasal drip) and posterior oropharyngeal erythema (mild) present. No posterior oropharyngeal edema. Tonsils are 1+ on the right. Tonsils are 1+ on the left. No tonsillar exudate.   Eyes: Conjunctivae and lids are normal. No scleral icterus.   Neck: Trachea normal and phonation normal. Neck supple. No edema present. No erythema present. No neck rigidity present.   Cardiovascular: Normal rate, regular rhythm, normal heart sounds and normal pulses.   Pulmonary/Chest: Effort normal. No accessory muscle usage or stridor. No tachypnea. No respiratory distress. She has decreased breath sounds in the right lower field. She has no wheezes. She has no rhonchi. She has no rales.   Dry cough with bronchospasm elicited multiple times during exam.         Comments: Dry cough with bronchospasm elicited multiple times during exam.    Abdominal: Normal appearance.   Musculoskeletal: Normal range of motion.         General: No deformity. Normal range of motion.   Lymphadenopathy:        Head (right side): No submandibular adenopathy present.        Head (left side): No submandibular adenopathy present.     She has no cervical adenopathy.   Neurological: She is alert and oriented to person, place, and time. She exhibits normal muscle  tone. Coordination normal.   Skin: Skin is warm, dry, intact, not diaphoretic and not pale.   Psychiatric: Her speech is normal and behavior is normal. Judgment and thought content normal.   Nursing note and vitals reviewed.    Results for orders placed or performed in visit on 12/27/22   SARS Coronavirus 2 Antigen, POCT Manual Read   Result Value Ref Range    SARS Coronavirus 2 Antigen Positive (A) Negative     Acceptable Yes      *Note: Due to a large number of results and/or encounters for the requested time period, some results have not been displayed. A complete set of results can be found in Results Review.     XR CHEST PA AND LATERAL    Result Date: 12/27/2022  EXAMINATION: XR CHEST PA AND LATERAL CLINICAL HISTORY: Cough, unspecified TECHNIQUE: PA and lateral views of the chest were performed. COMPARISON: 06/13/2022. FINDINGS: The lungs are well expanded and clear. No focal opacities are seen. The pleural spaces are clear. The cardiac silhouette is unremarkable. The visualized osseous structures demonstrate degenerative changes.  Partially imaged cervical spine hardware noted. There are right upper quadrant surgical clips.     No acute cardiopulmonary abnormality. Electronically signed by: Jacob Aviles Date:    12/27/2022 Time:    17:44         Assessment:       1. COVID-19 virus infection    2. Cough, unspecified type    3. Bronchitis          Plan:       After Duo-Neb TX:  pt reports that she feels somewhat better, states that she feels like the mucus is breaking up.  Chest expansion improved overall, pt appears more comfortable.    Discussed preliminary x-ray results, will call with radiology read if any abnormalities are seen.    Provided education on molnupiravir and antiviral treatment, pt unable to take Paxlovid due to multiple medication contraindications.  Provided with printed information sheet.    Provided education on OTC symptomatic treatment (Claritin/Zyrtec with Flonase nasal  spray for sinus congestion and cough and Tylenol/ibuprofen for ear pain and elevated temperature, if needed).  Provided education on CDC recommendations for isolation/masking and for return/ER precautions.  Urged immediate ER visit if SOB worsens or if any other symptoms worsen.  Pt and daughter verbalized understanding and agreed to treatment plan.      COVID-19 virus infection  -     cetirizine (ZYRTEC) 10 MG tablet; Take 1 tablet (10 mg total) by mouth once daily.  Dispense: 30 tablet; Refill: 0  -     fluticasone propionate (FLONASE) 50 mcg/actuation nasal spray; 2 sprays (100 mcg total) by Each Nostril route once daily.  Dispense: 18.2 mL; Refill: 0  -     molnupiravir 200 mg capsule (EUA); Take 4 capsules (800 mg total) by mouth every 12 (twelve) hours. for 5 days  Dispense: 40 capsule; Refill: 0    Cough, unspecified type  -     SARS Coronavirus 2 Antigen, POCT Manual Read  -     XR CHEST PA AND LATERAL; Future; Expected date: 12/27/2022  -     albuterol nebulizer solution 2.5 mg  -     ipratropium 0.02 % nebulizer solution 0.5 mg  -     benzonatate (TESSALON) 100 MG capsule; Take 1 capsule (100 mg total) by mouth 3 (three) times daily as needed for Cough.  Dispense: 30 capsule; Refill: 0  -     promethazine-dextromethorphan (PROMETHAZINE-DM) 6.25-15 mg/5 mL Syrp; Take 5 mLs by mouth every 4 (four) hours as needed (cough).  Dispense: 240 mL; Refill: 0    Bronchitis  -     albuterol-ipratropium (DUO-NEB) 2.5 mg-0.5 mg/3 mL nebulizer solution; Take 3 mLs by nebulization every 6 (six) hours as needed for Wheezing. Rescue  Dispense: 45 mL; Refill: 0      Patient Instructions   You have tested positive for COVID-19 today.      ISOLATION  If you tested positive and do not have symptoms, you must isolate for 5 days starting on the day of the positive test.    If you tested positive and have symptoms, you must isolate for 5 days starting on the day of the first symptoms, not the day of the positive test.     This is  "the most important part, both the CDC and the [facility] emphasize that you do not test out of isolation.     After 5 days, if your symptoms have improved and you have not had fever on day 5, you can return to the community on day 6- NO TESTING REQUIRED!      In fact, we do not re-test if you were positive in the last 90 days.    After your 5 days of isolation are completed, the CDC recommends strict mask use for the first 5 days that you come out of isolation.    -----    If your condition worsens or fails to improve, we recommend that you receive another evaluation at the ER immediately contact your PCP to discuss your concerns, or return here.  You must understand that you've received an urgent care treatment only, and that you may be released before all your medical problems are known or treated.  You, the patient, will arrange for follow-up care as instructed.     If we discussed that I think your illness is viral, it will not respond to antibiotics and will last 10-14 days.  If we discussed "wait and see" antibiotics, and if over the next few days the symptoms worsen, start the antibiotics I have given you.     If you are female and on birth control pills and do take the antibiotics, use additional methods to prevent pregnancy while on the antibiotics and for one cycle after.     Flonase (fluticasone) is a nasal spray which is available over the counter and may help with your symptoms.  Zyrtec D, Claritin D, or Allegra D can also help with symptoms of congestion and drainage.  If you have hypertension, avoid using the "D" which is the decongestant formula.    If you just have drainage, you can take plain Zyrtec, Claritin, or Allegra.  If you just have a congested feeling, you can take pseudoephedrine (unless you have high blood pressure), which you have to sign for behind the counter.  Do not buy phenylephrine OTC, as it is not effective.    Rest and fluids are also important.  Tylenol or ibuprofen can also be used " as directed for pain, unless you have an allergy to them or medical condition (such as stomach ulcers, kidney or liver disease, or use blood thinners, etc.) for which you should not be taking these type of medications.     If you are flying in the next few days, Afrin nose drops for the airplane flight upon take off and landing may help.  Other than at those times, refrain from using Afrin.     If you were prescribed a narcotic or sedating cough medicine, do not drive or operate heavy machinery while taking these medications.

## 2022-12-29 ENCOUNTER — PATIENT MESSAGE (OUTPATIENT)
Dept: FAMILY MEDICINE | Facility: CLINIC | Age: 77
End: 2022-12-29
Payer: MEDICARE

## 2022-12-29 PROCEDURE — 99284 EMERGENCY DEPT VISIT MOD MDM: CPT | Mod: CR,,, | Performed by: EMERGENCY MEDICINE

## 2022-12-29 PROCEDURE — 93010 ELECTROCARDIOGRAM REPORT: CPT | Mod: ,,, | Performed by: INTERNAL MEDICINE

## 2022-12-29 PROCEDURE — 93010 EKG 12-LEAD: ICD-10-PCS | Mod: ,,, | Performed by: INTERNAL MEDICINE

## 2022-12-29 PROCEDURE — 99284 PR EMERGENCY DEPT VISIT,LEVEL IV: ICD-10-PCS | Mod: CR,,, | Performed by: EMERGENCY MEDICINE

## 2022-12-29 PROCEDURE — 99285 EMERGENCY DEPT VISIT HI MDM: CPT | Mod: 25

## 2022-12-29 PROCEDURE — 93005 ELECTROCARDIOGRAM TRACING: CPT

## 2022-12-30 ENCOUNTER — HOSPITAL ENCOUNTER (EMERGENCY)
Facility: HOSPITAL | Age: 77
Discharge: HOME | End: 2022-12-30
Attending: EMERGENCY MEDICINE
Payer: MEDICARE

## 2022-12-30 VITALS
TEMPERATURE: 98 F | RESPIRATION RATE: 18 BRPM | HEART RATE: 90 BPM | SYSTOLIC BLOOD PRESSURE: 130 MMHG | OXYGEN SATURATION: 95 % | DIASTOLIC BLOOD PRESSURE: 71 MMHG

## 2022-12-30 DIAGNOSIS — U07.1 COVID-19: Primary | ICD-10-CM

## 2022-12-30 LAB
ALBUMIN SERPL BCP-MCNC: 3.8 G/DL (ref 3.5–5.2)
ALP SERPL-CCNC: 60 U/L (ref 55–135)
ALT SERPL W/O P-5'-P-CCNC: 41 U/L (ref 10–44)
ANION GAP SERPL CALC-SCNC: 13 MMOL/L (ref 8–16)
AST SERPL-CCNC: 51 U/L (ref 10–40)
BASOPHILS # BLD AUTO: 0.02 K/UL (ref 0–0.2)
BASOPHILS NFR BLD: 0.5 % (ref 0–1.9)
BILIRUB SERPL-MCNC: 0.7 MG/DL (ref 0.1–1)
BNP SERPL-MCNC: 11 PG/ML (ref 0–99)
BUN SERPL-MCNC: 17 MG/DL (ref 8–23)
CALCIUM SERPL-MCNC: 9.5 MG/DL (ref 8.7–10.5)
CHLORIDE SERPL-SCNC: 90 MMOL/L (ref 95–110)
CO2 SERPL-SCNC: 22 MMOL/L (ref 23–29)
CREAT SERPL-MCNC: 1.2 MG/DL (ref 0.5–1.4)
DIFFERENTIAL METHOD: ABNORMAL
EOSINOPHIL # BLD AUTO: 0.1 K/UL (ref 0–0.5)
EOSINOPHIL NFR BLD: 1.8 % (ref 0–8)
ERYTHROCYTE [DISTWIDTH] IN BLOOD BY AUTOMATED COUNT: 13.1 % (ref 11.5–14.5)
EST. GFR  (NO RACE VARIABLE): 46.6 ML/MIN/1.73 M^2
GLUCOSE SERPL-MCNC: 112 MG/DL (ref 70–110)
HCT VFR BLD AUTO: 34 % (ref 37–48.5)
HGB BLD-MCNC: 11.1 G/DL (ref 12–16)
IMM GRANULOCYTES # BLD AUTO: 0.1 K/UL (ref 0–0.04)
IMM GRANULOCYTES NFR BLD AUTO: 2.6 % (ref 0–0.5)
LYMPHOCYTES # BLD AUTO: 1.1 K/UL (ref 1–4.8)
LYMPHOCYTES NFR BLD: 26.9 % (ref 18–48)
MCH RBC QN AUTO: 33.7 PG (ref 27–31)
MCHC RBC AUTO-ENTMCNC: 32.6 G/DL (ref 32–36)
MCV RBC AUTO: 103 FL (ref 82–98)
MONOCYTES # BLD AUTO: 0.4 K/UL (ref 0.3–1)
MONOCYTES NFR BLD: 10 % (ref 4–15)
NEUTROPHILS # BLD AUTO: 2.3 K/UL (ref 1.8–7.7)
NEUTROPHILS NFR BLD: 58.2 % (ref 38–73)
NRBC BLD-RTO: 0 /100 WBC
PLATELET # BLD AUTO: 297 K/UL (ref 150–450)
PLATELET BLD QL SMEAR: ABNORMAL
PMV BLD AUTO: 8.6 FL (ref 9.2–12.9)
POTASSIUM SERPL-SCNC: 3.9 MMOL/L (ref 3.5–5.1)
PROT SERPL-MCNC: 7.6 G/DL (ref 6–8.4)
RBC # BLD AUTO: 3.29 M/UL (ref 4–5.4)
SODIUM SERPL-SCNC: 125 MMOL/L (ref 136–145)
TROPONIN I SERPL DL<=0.01 NG/ML-MCNC: 0.01 NG/ML (ref 0–0.03)
TROPONIN I SERPL DL<=0.01 NG/ML-MCNC: 0.01 NG/ML (ref 0–0.03)
WBC # BLD AUTO: 3.9 K/UL (ref 3.9–12.7)

## 2022-12-30 PROCEDURE — 85025 COMPLETE CBC W/AUTO DIFF WBC: CPT | Performed by: EMERGENCY MEDICINE

## 2022-12-30 PROCEDURE — 84484 ASSAY OF TROPONIN QUANT: CPT | Mod: 91 | Performed by: EMERGENCY MEDICINE

## 2022-12-30 PROCEDURE — 84484 ASSAY OF TROPONIN QUANT: CPT | Performed by: EMERGENCY MEDICINE

## 2022-12-30 PROCEDURE — 83880 ASSAY OF NATRIURETIC PEPTIDE: CPT | Performed by: EMERGENCY MEDICINE

## 2022-12-30 PROCEDURE — 94761 N-INVAS EAR/PLS OXIMETRY MLT: CPT

## 2022-12-30 PROCEDURE — 80053 COMPREHEN METABOLIC PANEL: CPT | Performed by: EMERGENCY MEDICINE

## 2022-12-30 RX ORDER — ONDANSETRON 4 MG/1
4 TABLET, ORALLY DISINTEGRATING ORAL EVERY 6 HOURS PRN
Qty: 12 TABLET | Refills: 0 | Status: SHIPPED | OUTPATIENT
Start: 2022-12-30 | End: 2023-01-02

## 2022-12-30 NOTE — DISCHARGE INSTRUCTIONS
Diagnosis:  COVID-19    Tests today showed:   Labs Reviewed   CBC W/ AUTO DIFFERENTIAL - Abnormal; Notable for the following components:       Result Value    RBC 3.29 (*)     Hemoglobin 11.1 (*)     Hematocrit 34.0 (*)      (*)     MCH 33.7 (*)     MPV 8.6 (*)     Immature Granulocytes 2.6 (*)     Immature Grans (Abs) 0.10 (*)     All other components within normal limits   COMPREHENSIVE METABOLIC PANEL - Abnormal; Notable for the following components:    Sodium 125 (*)     Chloride 90 (*)     CO2 22 (*)     Glucose 112 (*)     AST 51 (*)     eGFR 46.6 (*)     All other components within normal limits   TROPONIN I   B-TYPE NATRIURETIC PEPTIDE   TROPONIN I     X-Ray Chest AP Portable    (Results Pending)       Treatments you had today:   Medications - No data to display    Follow-Up Plan:  - Follow-up with primary care doctor within 3 - 5 days  - Additional testing and/or evaluation as directed by your primary doctor    Return to the Emergency Department for symptoms including but not limited to: worsening symptoms, shortness of breath or chest pain, vomiting with inability to hold down fluids, fevers greater than 100.4°F, passing out/fainting/unconsciousness, or other concerning symptoms.

## 2022-12-30 NOTE — ED TRIAGE NOTES
Arfritz Lawrence, an 77 y.o. female presents to the ED from home. Pt c/o SOB since Wed. Pt was dx with covid on Wed.      Chief Complaint   Patient presents with    Shortness of Breath     Pt was diagnosed with COVID 2 days ago and c/o a cough and increase SOB since then. Also c/o some associated CP as well.      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, without long-term current use of insulin     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, without long-term current use of insulin     Depression     Diabetes mellitus, type 2     Eye injury as a child     [id number]  od    Hyperlipidemia     Hypertension     Hypothyroidism     Immune deficiency disorder     Immune disorder     LOC (loss of consciousness) 03/12/2021    at home    Myalgia and myositis 9/6/2012    Osteoporosis     Polyneuropathy     Pulmonary embolism 7/10/2021    Renal manifestation of secondary diabetes mellitus     Sarcoidosis     Seizure     Type 2 diabetes mellitus     Ulcer     no cancer    Urinary incontinence

## 2022-12-30 NOTE — ED PROVIDER NOTES
Source of History:  Patient  Chart    Chief complaint:  Shortness of Breath (Pt was diagnosed with COVID 2 days ago and c/o a cough and increase SOB since then. Also c/o some associated CP as well. )      HPI:  Regino Lawrence is a 77 y.o. female with history of CKD, chronic anemia, type 2 diabetes, sarcoidosis, hypertension, hyperlipidemia, hypothyroidism, chronic pain, diastolic dysfunction, recent diagnosis of COVID-19, on Eliquis, presenting to emergency department with complaint of  cough and shortness of breath.     Per chart review, patient is a [employer].  She was admitted on 06/22 to the hospital with shortness of breath.  Her sarcoidosis is followed by Rheumatology, and is associated with myopathy and arthropathy.  Both methotrexate and Plaquenil were ineffective for her.  She had side effects from CellCept and Imuran.  She uses a motorized scooter.    Patient states that she was diagnosed with COVID 2 days ago.  She was prescribed Molnupiravir, can not receive Paxlovid.  States that she has had an ongoing cough that is not productive of sputum.  States that she feels short of breath while she is coughing.  She denies chest pain to me.  No abdominal pain.  She states that she had 1 episode of vomiting, which has since resolved.  She was concerned because she thought that starting the COVID medication would cure the illness.  She denies fevers.  No syncope.  No other complaints at this time.    ROS: As per HPI and below:  Review of Systems   Constitutional:  Negative for fever.   HENT:  Negative for sore throat.    Eyes:  Negative for double vision.   Respiratory:  Positive for cough and shortness of breath. Negative for sputum production.    Cardiovascular:  Negative for chest pain.   Gastrointestinal:  Negative for abdominal pain and vomiting.   Genitourinary:  Negative for dysuria.   Musculoskeletal:  Negative for falls.   Skin:  Negative for rash.   Neurological:  Negative for headaches.      Review of patient's allergies indicates:   Allergen Reactions    Azathioprine Shortness Of Breath and Other (See Comments)     Fatigue       Current Facility-Administered Medications on File Prior to Encounter   Medication Dose Route Frequency Provider Last Rate Last Admin    denosumab (PROLIA) injection 60 mg  60 mg Subcutaneous 1 time in Clinic/HOD Micaela Mendoza MD         Current Outpatient Medications on File Prior to Encounter   Medication Sig Dispense Refill    ACCU-CHEK FASTCLIX LANCING DEV Kit USE AS DIRECTED. 1 each 0    albuterol-ipratropium (DUO-NEB) 2.5 mg-0.5 mg/3 mL nebulizer solution Take 3 mLs by nebulization every 4 (four) hours as needed for Wheezing or Shortness of Breath. Rescue 90 each 11    albuterol-ipratropium (DUO-NEB) 2.5 mg-0.5 mg/3 mL nebulizer solution Take 3 mLs by nebulization every 6 (six) hours as needed for Wheezing. Rescue 45 mL 0    ALCOHOL ANTISEPTIC PADS (ALCOHOL PREP PADS TOP)       apixaban (ELIQUIS) 5 mg Tab Take 1 tablet (5 mg total) by mouth 2 (two) times daily. Start this prescription after finishing starter pack 60 tablet 5    atorvastatin (LIPITOR) 20 MG tablet Take 1 tablet by mouth once daily 90 tablet 2    benzonatate (TESSALON) 100 MG capsule Take 1 capsule (100 mg total) by mouth 3 (three) times daily as needed for Cough. 30 capsule 0    blood sugar diagnostic (ACCU-CHEK SMARTVIEW TEST STRIP) Strp Use as directed to check blood sugar twice daily. 200 strip 3    blood sugar diagnostic Strp To check BG three times daily, to use with insurance preferred meter 300 each 3    blood-glucose meter kit Use as instructed 1 each 0    blood-glucose meter kit To check BG three times daily, to use with insurance preferred meter 1 each 0    brivaracetam (BRIVIACT) 75 mg Tab Take 1 tablet (75 mg total) by mouth 2 (two) times daily. 60 tablet 2    calcium carbonate (OS-MALCOLM) 600 mg calcium (1,500 mg) Tab Take 1 tablet by mouth 2 (two) times daily.       carvediloL (COREG) 25 MG  "tablet Take 1 tablet (25 mg total) by mouth 2 (two) times daily. 180 tablet 3    cetirizine (ZYRTEC) 10 MG tablet Take 1 tablet (10 mg total) by mouth once daily. 30 tablet 0    DULoxetine (CYMBALTA) 20 MG capsule Take 1 capsule (20 mg total) by mouth once daily. 90 capsule 0    epoetin german-epbx (RETACRIT) 10,000 unit/mL imjection Inject 20,000 Units into the skin every 14 (fourteen) days.      fenofibrate 160 MG Tab Take 1 tablet (160 mg total) by mouth once daily. 90 tablet 1    fluticasone propion-salmeterol 115-21 mcg/dose (ADVAIR HFA) 115-21 mcg/actuation HFAA inhaler Inhale 2 puffs into the lungs every 12 (twelve) hours. Controller 12 g 3    fluticasone propionate (FLONASE) 50 mcg/actuation nasal spray 2 sprays (100 mcg total) by Each Nostril route once daily. 18.2 mL 0    folic acid (FOLVITE) 1 MG tablet Take 1 tablet (1,000 mcg total) by mouth once daily. 90 tablet 1    hydrALAZINE (APRESOLINE) 25 MG tablet Take 2 tabs every 8 hours by mouth. Check BP 2 hours after each dose and if Blood pressure >160- please take 1 extra tab 120 tablet 1    insulin detemir U-100 (LEVEMIR FLEXTOUCH) 100 unit/mL (3 mL) SubQ InPn pen Inject 10 Units into the skin every evening. 3 mL 11    KLGA ketoprofen 10% LIDOcaine 10% gabapentin 6% amitriptyline 2% in transdermal cream Apply 1 to 2 grams 3 to 4 times daily 90 g 11    levETIRAcetam (KEPPRA) 250 MG Tab Take 1 tablet (250 mg total) by mouth 2 (two) times daily. 60 tablet 5    levothyroxine (SYNTHROID) 50 MCG tablet Take 1 tablet (50 mcg total) by mouth before breakfast. 90 tablet 3    NIFEdipine (PROCARDIA-XL) 90 MG (OSM) 24 hr tablet Take 1 tablet (90 mg total) by mouth once daily. 90 tablet 1    olopatadine (PATANOL) 0.1 % ophthalmic solution Place 1 drop into both eyes daily as needed for Allergies. 5 mL 1    pantoprazole (PROTONIX) 40 MG tablet Take 1 tablet (40 mg total) by mouth once daily. 90 tablet 1    pen needle, diabetic (NOVOFINE 32) 32 gauge x 1/4" Ndle For use " with levermir pen . 90 each 2    potassium chloride SA (K-DUR,KLOR-CON) 20 MEQ tablet Take 1 tablet (20 mEq total) by mouth 2 (two) times daily. 180 tablet 1    predniSONE (DELTASONE) 5 MG tablet Take 1 tablet (5 mg total) by mouth once daily. 90 tablet 0    promethazine-dextromethorphan (PROMETHAZINE-DM) 6.25-15 mg/5 mL Syrp Take 5 mLs by mouth every 4 (four) hours as needed (cough). 240 mL 0    tiZANidine (ZANAFLEX) 2 MG tablet Take 2 tablets (4 mg total) by mouth every 8 (eight) hours as needed (muscle spasm). 270 tablet 1    [DISCONTINUED] gabapentin (NEURONTIN) 400 MG capsule Take 1 capsule (400 mg total) by mouth 3 (three) times daily 90 capsule 1       PMH:  As per HPI and below:  Past Medical History:   Diagnosis Date    Acid reflux     Allergy     Alopecia     Anemia     Anemia in CKD (chronic kidney disease) 9/22/2016    Anxiety     Arthritis     Back pain     Cataract     Chronic kidney disease     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, without long-term current use of insulin     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, without long-term current use of insulin     Depression     Diabetes mellitus, type 2     Eye injury as a child     k-abrasion  od    Hyperlipidemia     Hypertension     Hypothyroidism     Immune deficiency disorder     Immune disorder     LOC (loss of consciousness) 03/12/2021    at home    Myalgia and myositis 9/6/2012    Osteoporosis     Polyneuropathy     Pulmonary embolism 7/10/2021    Renal manifestation of secondary diabetes mellitus     Sarcoidosis     Seizure     Type 2 diabetes mellitus     Ulcer     no cancer    Urinary incontinence      Past Surgical History:   Procedure Laterality Date    CARPAL TUNNEL RELEASE      Rt wrist    CATARACT EXTRACTION W/  INTRAOCULAR LENS IMPLANT Right 4/30/2015    Dr. Azevedo    CATARACT EXTRACTION W/  INTRAOCULAR LENS IMPLANT Left  2015    Dr. Azevedo    CERVICAL SPINE SURGERY       SECTION      CHOLECYSTECTOMY      INJECTION OF ANESTHETIC AGENT AROUND NERVE Left 2020    Procedure: BLOCK, NERVE LEFT FEMORAL AND OBTURATOR;  Surgeon: Alfonso Richards MD;  Location: [facility];  Service: Pain Management;  Laterality: Left;  NEEDS CONSENT    INJECTION OF JOINT Left 3/21/2019    Procedure: Injection, Joint  fLUOROSCOPIC jOINT iNJECTION (hIP iNJECTION) LEFT ROCH BURSA AS WELL LEFT TROCHANTERIC BURSA;  Surgeon: Alfonso Richards MD;  Location: [facility];  Service: Pain Management;  Laterality: Left;  NEEDS CONSENT, DIABETIC    INJECTION OF JOINT Left 2019    Procedure: Injection, Joint FLUOROSCOPIC JOINT INJECTION (HIP INJECTION) LEFT HIP;  Surgeon: Alfonso Richards MD;  Location: [facility];  Service: Pain Management;  Laterality: Left;  NEEDS CONSENT    INJECTION OF JOINT Left 2019    Procedure: INJECTION, JOINT;  Surgeon: Alfonso Richards MD;  Location: [facility];  Service: Pain Management;  Laterality: Left;  Left Hip and Left GTB Injections    INJECTION OF JOINT Left 2020    Procedure: INJECTION, JOINT, LEFT HIP and LEFT GREATER TROCHANTERIC BURSA;  Surgeon: Alfonso Richards MD;  Location: [facility];  Service: Pain Management;  Laterality: Left;  INJECTION, JOINT, LEFT HIP and LEFT GREATER TROCHANTERIC BURSA    INJECTION OF JOINT Left 9/3/2020    Procedure: INJECTION, JOINT, LEFT SI;  Surgeon: Alfonso Richards MD;  Location: [facility];  Service: Pain Management;  Laterality: Left;  INJECTION, JOINT, LEFT SI    TRANSFORAMINAL EPIDURAL INJECTION OF STEROID Bilateral 2019    Procedure: INJECTION, STEROID, EPIDURAL, TRANSFORAMINAL APPROACH;  Surgeon: Alfonso Richards MD;  Location: [facility];  Service: Pain Management;  Laterality: Bilateral;  B/L TF RHIANNA L5  Consent Needed    TRANSFORAMINAL EPIDURAL INJECTION OF STEROID Bilateral 2020    Procedure: INJECTION, STEROID, EPIDURAL, TRANSFORAMINAL APPROACH  L5/S1;  Surgeon: Alfonso Richards MD;  Location: [facility];  Service: Pain Management;  Laterality: Bilateral;  B/L TF RHIANNA L5/S1    TUBAL LIGATION         Social History     Socioeconomic History    Marital status:      Spouse name: Gasper     Number of children: 5    Years of education: Business school after high school    Highest education level: 12th grade   Tobacco Use    Smoking status: Never    Smokeless tobacco: Never   Substance and Sexual Activity    Alcohol use: No    Drug use: No    Sexual activity: Not Currently     Partners: Male       Family History   Problem Relation Age of Onset    Hypertension Mother     Cataracts Mother     No Known Problems Father     Hypertension Maternal Grandmother     Glaucoma Sister     Arthritis Sister     No Known Problems Brother     No Known Problems Maternal Aunt     No Known Problems Maternal Uncle     No Known Problems Paternal Aunt     No Known Problems Paternal Uncle     No Known Problems Maternal Grandfather     No Known Problems Paternal Grandmother     No Known Problems Paternal Grandfather     Kidney failure Sister     Hepatitis Sister     Cancer Sister         bone cancer     Immunodeficiency Sister     Diabetes Son     Hypertension Son     Lupus Neg Hx     Rheum arthritis Neg Hx     Amblyopia Neg Hx     Blindness Neg Hx     Macular degeneration Neg Hx     Retinal detachment Neg Hx     Strabismus Neg Hx     Stroke Neg Hx     Thyroid disease Neg Hx     Endometrial cancer Neg Hx     Vaginal cancer Neg Hx     Cervical cancer Neg Hx        Physical Exam:      Vitals:    12/30/22 0437   BP: 130/71   Pulse: 90   Resp: 18   Temp: 98.4 °F (36.9 °C)     Gen: No acute distress.  Nontoxic.  Well appearing.  Mental Status:  Alert and oriented .  Appropriate, conversant.  Skin: Warm, dry. No rashes seen.  Eyes: No conjunctival injection.  Pulm: CTAB. No increased work of breathing.  No significant tachypnea.  No audible stridor or wheezing.  No conversational dyspnea.     CV: Regular rate. Regular rhythm.   Abd: Soft.  Not distended.  Nontender.   MSK: Good range of motion all joints.  No deformities.    Neuro: Awake. Speech normal. No focal neuro deficit observed.    Laboratory Studies:  Labs Reviewed   CBC W/ AUTO DIFFERENTIAL - Abnormal; Notable for the following components:       Result Value    RBC 3.29 (*)     Hemoglobin 11.1 (*)     Hematocrit 34.0 (*)      (*)     MCH 33.7 (*)     MPV 8.6 (*)     Immature Granulocytes 2.6 (*)     Immature Grans (Abs) 0.10 (*)     All other components within normal limits   COMPREHENSIVE METABOLIC PANEL - Abnormal; Notable for the following components:    Sodium 125 (*)     Chloride 90 (*)     CO2 22 (*)     Glucose 112 (*)     AST 51 (*)     eGFR 46.6 (*)     All other components within normal limits   TROPONIN I   B-TYPE NATRIURETIC PEPTIDE   TROPONIN I       EKG (independently interpreted by me):  Normal sinus rhythm, rate 93.  No STEMI.  QRS 82 millisecond.   milliseconds.    X-rays (independently interpreted by me):  No acute abnormality.  No infiltrate.  No pneumothorax.    Chart reviewed.  See summary in HPI    Imaging Results              X-Ray Chest AP Portable (Final result)  Result time 12/30/22 04:06:27      Final result by Thomas Varela MD (12/30/22 04:06:27)                   Impression:      Underinflated lungs with hypoventilatory change and crowding of basilar markings.    Otherwise, no significant change from prior study.      Electronically signed by: Thomas Varela MD  Date:    12/30/2022  Time:    04:06               Narrative:    EXAMINATION:  XR CHEST AP PORTABLE    CLINICAL HISTORY:  Chest Pain;    TECHNIQUE:  Single frontal view of the chest was performed.    COMPARISON:  12/27/2022.    FINDINGS:  Underinflated lungs with hypoventilatory change and crowding of basilar markings.    Partially visualized fixation hardware lower cervical spine, similar to prior.  Mild elevation right hemidiaphragm,  similar to prior.  Cholecystectomy clips, similar to prior.    Heart and lungs otherwise appear unchanged when allowing for differences in technique and positioning.                                      Medications Given:  Medications - No data to display    MDM:    77 y.o. female with complaint of ongoing symptoms related to COVID-19, which was diagnosed 2 days prior.  Afebrile and hemodynamically stable.  She is not hypoxic and has normal work of breathing.  No focal breath sounds on her lung exam.  Abdomen is soft and nontender, doubt intra abdominal pathology.    Labs reviewed.  Mild hyponatremia which is on surprising in the setting of COVID.  No elevation of her white blood cell count, creatinine, BNP, troponin.  Her chest x-ray is unremarkable.    I explained to the patient that the Molnupiravir is not curative.  She will continue to have symptoms for some time.  I confirmed that the patient's daughter, who is present here, that they have a pulse oximeter and they know how to use it.  She is tolerating p.o..  Will plan to send Zofran to her pharmacy.  Discharged home in stable condition.  Return precautions discussed at bedside.    Diagnostic Impression:    1. COVID-19         ED Disposition Condition    Discharge Stable          ED Prescriptions       Medication Sig Dispense Start Date End Date Auth. Provider    ondansetron (ZOFRAN-ODT) 4 MG TbDL () Take 1 tablet (4 mg total) by mouth every 6 (six) hours as needed. 12 tablet 2022 Staci Tucker MD          Follow-up Information       Follow up With Specialties Details Why Contact Info    Micaela Mendoza MD Family Medicine Schedule an appointment as soon as possible for a visit   [address] [place] [id number]  [phone]              Patient understands the plan and is in agreement, verbalized understanding, questions answered    Staci Tucker MD  Emergency Medicine         Staci Tucker MD  23 [phone]

## 2022-12-30 NOTE — PROVIDER PROGRESS NOTES - EMERGENCY DEPT.
Encounter Date: 12/29/2022    ED Physician Progress Notes         EKG - STEMI Decision  Initial Reading: No STEMI present.  Response: No Action Needed.

## 2023-01-12 ENCOUNTER — CLINICAL SUPPORT (OUTPATIENT)
Dept: FAMILY MEDICINE | Facility: CLINIC | Age: 78
End: 2023-01-12
Payer: MEDICARE

## 2023-01-12 DIAGNOSIS — M81.0 OSTEOPOROSIS WITHOUT CURRENT PATHOLOGICAL FRACTURE, UNSPECIFIED OSTEOPOROSIS TYPE: Primary | ICD-10-CM

## 2023-01-12 PROCEDURE — 99499 UNLISTED E&M SERVICE: CPT | Mod: S$GLB,,, | Performed by: FAMILY MEDICINE

## 2023-01-12 PROCEDURE — 96372 THER/PROPH/DIAG INJ SC/IM: CPT | Mod: S$GLB,,, | Performed by: FAMILY MEDICINE

## 2023-01-12 PROCEDURE — 96372 PR INJECTION,THERAP/PROPH/DIAG2ST, IM OR SUBCUT: ICD-10-PCS | Mod: S$GLB,,, | Performed by: FAMILY MEDICINE

## 2023-01-12 PROCEDURE — 99499 NO LOS: ICD-10-PCS | Mod: S$GLB,,, | Performed by: FAMILY MEDICINE

## 2023-01-12 NOTE — PROGRESS NOTES
Verified patient's identification with full name and date of birth, and verified allergies. Patient given Prolia #1 to right arm subcutaneously, no complaints noted at the time. Instructed to wait in lobby for 15 minutes to note for reactions.  Instructed to contact office for further questions.

## 2023-01-12 NOTE — Clinical Note
Marissa - she received her first dose of prolia today. I know we will need another authorization for her 2nd dose 6 months out

## 2023-01-13 ENCOUNTER — INFUSION (OUTPATIENT)
Dept: INFUSION THERAPY | Facility: HOSPITAL | Age: 78
End: 2023-01-13
Attending: INTERNAL MEDICINE
Payer: MEDICARE

## 2023-01-13 VITALS
SYSTOLIC BLOOD PRESSURE: 144 MMHG | TEMPERATURE: 98 F | RESPIRATION RATE: 18 BRPM | HEART RATE: 98 BPM | OXYGEN SATURATION: 95 % | DIASTOLIC BLOOD PRESSURE: 70 MMHG

## 2023-01-13 DIAGNOSIS — Z53.1 REFUSAL OF BLOOD TRANSFUSIONS AS PATIENT IS JEHOVAH'S WITNESS: ICD-10-CM

## 2023-01-13 DIAGNOSIS — N18.9 ANEMIA IN CHRONIC KIDNEY DISEASE, UNSPECIFIED CKD STAGE: ICD-10-CM

## 2023-01-13 DIAGNOSIS — D63.1 ANEMIA IN STAGE 3 CHRONIC KIDNEY DISEASE, UNSPECIFIED WHETHER STAGE 3A OR 3B CKD: Primary | ICD-10-CM

## 2023-01-13 DIAGNOSIS — N18.30 ANEMIA IN STAGE 3 CHRONIC KIDNEY DISEASE, UNSPECIFIED WHETHER STAGE 3A OR 3B CKD: Primary | ICD-10-CM

## 2023-01-13 DIAGNOSIS — D63.1 ANEMIA IN CHRONIC KIDNEY DISEASE, UNSPECIFIED CKD STAGE: ICD-10-CM

## 2023-01-13 DIAGNOSIS — D50.9 IRON DEFICIENCY ANEMIA, UNSPECIFIED IRON DEFICIENCY ANEMIA TYPE: ICD-10-CM

## 2023-01-13 PROCEDURE — 63600175 PHARM REV CODE 636 W HCPCS: Mod: JG,EC | Performed by: INTERNAL MEDICINE

## 2023-01-13 PROCEDURE — 96372 THER/PROPH/DIAG INJ SC/IM: CPT

## 2023-01-13 RX ADMIN — EPOETIN ALFA-EPBX 20000 UNITS: 20000 INJECTION, SOLUTION INTRAVENOUS; SUBCUTANEOUS at 11:01

## 2023-01-13 NOTE — PLAN OF CARE
Pt received q2w RETAcrit, 13557f. Labs done prior to arrival. Hgb 9.5 today. Pt tolerated injection well. Has next appointments. Discharged from unit using motor scooter.

## 2023-01-26 NOTE — ED PROVIDER NOTES
Encounter Date: 3/6/2017       History     Chief Complaint   Patient presents with    Back Pain     Pt here with c/o left lower back pain that began on Saturday. Pt denies urinary symptoms     Review of patient's allergies indicates:   Allergen Reactions    Azathioprine Shortness Of Breath and Other (See Comments)     Fatigue     The history is provided by the patient.    71-year-old who complains of pain to her left lower back.  Patient's pain has been constant for the last 2 days.  It worsens when she moves.  The pain radiates to her left lower quadrant.  She has been taken tramadol without improvement.  She rates her pain as 10 out of 10.  She denies associated symptoms such as nausea or vomiting.  Normal bowel movements.  No urinary symptoms.  No fever.  Patient denies trauma.  No heavy lifting or unusual activities.  The pain does not radiate to her legs.  Past Medical History:   Diagnosis Date    Acid reflux     Allergy     Alopecia     Anemia     Anxiety     Arthritis     Cataract     Chronic kidney disease     Depression     Diabetes mellitus, type 2     Eye injury as a child     k-abrasion  od    Hyperlipidemia     Hypertension     Hypothyroidism     Myalgia and myositis 2012    Osteoporosis     Sarcoidosis     Ulcer     no cancer     Past Surgical History:   Procedure Laterality Date    CARPAL TUNNEL RELEASE      Rt wrist    CATARACT EXTRACTION W/  INTRAOCULAR LENS IMPLANT Right 2015    Dr. Azevedo    CATARACT EXTRACTION W/  INTRAOCULAR LENS IMPLANT Left 2015    Dr. Azevedo     SECTION      CHOLECYSTECTOMY      TUBAL LIGATION       Family History   Problem Relation Age of Onset    Hypertension Mother     Cataracts Mother     No Known Problems Father     Hypertension Maternal Grandmother     Glaucoma Sister     No Known Problems Brother     No Known Problems Maternal Aunt     No Known Problems Maternal Uncle     No Known Problems Paternal Aunt      No Known Problems Paternal Uncle     No Known Problems Maternal Grandfather     No Known Problems Paternal Grandmother     No Known Problems Paternal Grandfather     Lupus Neg Hx     Rheum arthritis Neg Hx     Amblyopia Neg Hx     Blindness Neg Hx     Cancer Neg Hx     Diabetes Neg Hx     Macular degeneration Neg Hx     Retinal detachment Neg Hx     Strabismus Neg Hx     Stroke Neg Hx     Thyroid disease Neg Hx     Endometrial cancer Neg Hx     Vaginal cancer Neg Hx     Cervical cancer Neg Hx      Social History   Substance Use Topics    Smoking status: Never Smoker    Smokeless tobacco: Never Used    Alcohol use No     Review of Systems   Constitutional: Negative for fever.   HENT: Negative for sore throat.    Eyes: Negative for pain.   Respiratory: Negative for shortness of breath.    Cardiovascular: Negative for chest pain.   Gastrointestinal: Positive for abdominal pain.   Genitourinary: Positive for flank pain. Negative for dysuria.   Musculoskeletal: Positive for back pain.   Skin: Negative for rash.   Neurological: Negative for headaches.   Hematological:        No bleeding       Physical Exam   Initial Vitals   BP Pulse Resp Temp SpO2   03/06/17 1051 03/06/17 1051 03/06/17 1051 03/06/17 1051 03/06/17 1051   170/95 112 18 97.9 °F (36.6 °C) 100 %     Physical Exam    Nursing note and vitals reviewed.  Constitutional: She appears well-developed and well-nourished. She appears distressed.   HENT:   Head: Normocephalic and atraumatic.   Eyes: Conjunctivae and EOM are normal. Pupils are equal, round, and reactive to light.   Neck: Normal range of motion. Neck supple.   Cardiovascular: Normal rate and regular rhythm.   Pulmonary/Chest: Breath sounds normal.   Abdominal: Soft. There is no tenderness. There is no rebound and no guarding.   Musculoskeletal: Normal range of motion.        Back:    Neurological: She is alert and oriented to person, place, and time. She has normal strength. No  sensory deficit.   Skin: Skin is warm and dry.   Psychiatric: She has a normal mood and affect.         ED Course   Procedures  Labs Reviewed   POCT URINALYSIS W/O SCOPE   POCT CBC   POCT CMP             Medical Decision Making:   Initial Assessment:   71-year-old who complains of pain to her left lower back.  Patient's pain has been constant for the last 2 days.  On exam, she appears in moderate distress and has tenderness to her lower back.  Patient is in obvious pain with movement.  ED Management:  CT without contrast will be done of the abdomen and pelvis.  Urinalysis, CBC and CMP will be done.  Patient was given Percocet and Valium by mouth for the symptoms.  Patient reports almost  complete relief after the medications provided.  No significant abnormalities were found on labs or CT.  Patient will be discharged on Percocet and Valium.                   ED Course     Clinical Impression:   The primary encounter diagnosis was Left-sided low back pain without sciatica, unspecified chronicity. A diagnosis of Left flank pain was also pertinent to this visit.          Mary Lou Corey MD  03/06/17 2815     no

## 2023-01-27 ENCOUNTER — INFUSION (OUTPATIENT)
Dept: INFUSION THERAPY | Facility: HOSPITAL | Age: 78
End: 2023-01-27
Attending: INTERNAL MEDICINE
Payer: MEDICARE

## 2023-01-27 DIAGNOSIS — Z53.1 REFUSAL OF BLOOD TRANSFUSIONS AS PATIENT IS JEHOVAH'S WITNESS: ICD-10-CM

## 2023-01-27 DIAGNOSIS — D63.1 ANEMIA IN CHRONIC KIDNEY DISEASE, UNSPECIFIED CKD STAGE: ICD-10-CM

## 2023-01-27 DIAGNOSIS — N18.9 ANEMIA IN CHRONIC KIDNEY DISEASE, UNSPECIFIED CKD STAGE: ICD-10-CM

## 2023-01-27 DIAGNOSIS — D63.1 ANEMIA IN STAGE 3 CHRONIC KIDNEY DISEASE, UNSPECIFIED WHETHER STAGE 3A OR 3B CKD: Primary | ICD-10-CM

## 2023-01-27 DIAGNOSIS — D50.9 IRON DEFICIENCY ANEMIA, UNSPECIFIED IRON DEFICIENCY ANEMIA TYPE: ICD-10-CM

## 2023-01-27 DIAGNOSIS — N18.30 ANEMIA IN STAGE 3 CHRONIC KIDNEY DISEASE, UNSPECIFIED WHETHER STAGE 3A OR 3B CKD: Primary | ICD-10-CM

## 2023-01-27 PROCEDURE — 96372 THER/PROPH/DIAG INJ SC/IM: CPT

## 2023-01-27 PROCEDURE — 63600175 PHARM REV CODE 636 W HCPCS: Mod: JG,EC | Performed by: INTERNAL MEDICINE

## 2023-01-27 RX ADMIN — EPOETIN ALFA-EPBX 20000 UNITS: 20000 INJECTION, SOLUTION INTRAVENOUS; SUBCUTANEOUS at 01:01

## 2023-01-29 VITALS
DIASTOLIC BLOOD PRESSURE: 69 MMHG | SYSTOLIC BLOOD PRESSURE: 131 MMHG | RESPIRATION RATE: 15 BRPM | HEART RATE: 101 BPM | TEMPERATURE: 98 F

## 2023-01-29 NOTE — PLAN OF CARE
Patient arrived to unit for Retacrit injection. B/P 60/38. Manual B/P 64/40 on arrival. Patient reported taking too much of her B/P medication prior to arrival. PIV inserted, blood return noted and flushed w/o difficulty. 3L of IVFs infused bolus. B/P trending up with continuous IVFs infusing to gravity. Patient remained alert and oriented x3. IVFs infused w/o difficulty. B/P post infusion 131/69. Patient advised to go to the ER if symptoms worsen. Patient verbalized understanding. Hgb 9.2. Injection tolerated well. Discharged off unit via motor scooter in no acute signs of distress.

## 2023-01-30 ENCOUNTER — PATIENT MESSAGE (OUTPATIENT)
Dept: FAMILY MEDICINE | Facility: CLINIC | Age: 78
End: 2023-01-30
Payer: MEDICARE

## 2023-02-07 DIAGNOSIS — Z00.00 ENCOUNTER FOR MEDICARE ANNUAL WELLNESS EXAM: ICD-10-CM

## 2023-02-09 DIAGNOSIS — Z00.00 ENCOUNTER FOR MEDICARE ANNUAL WELLNESS EXAM: ICD-10-CM

## 2023-02-10 ENCOUNTER — INFUSION (OUTPATIENT)
Dept: INFUSION THERAPY | Facility: HOSPITAL | Age: 78
End: 2023-02-10
Attending: INTERNAL MEDICINE
Payer: MEDICARE

## 2023-02-10 VITALS
DIASTOLIC BLOOD PRESSURE: 74 MMHG | RESPIRATION RATE: 16 BRPM | OXYGEN SATURATION: 96 % | TEMPERATURE: 98 F | SYSTOLIC BLOOD PRESSURE: 160 MMHG | HEART RATE: 96 BPM

## 2023-02-10 DIAGNOSIS — Z53.1 REFUSAL OF BLOOD TRANSFUSIONS AS PATIENT IS JEHOVAH'S WITNESS: ICD-10-CM

## 2023-02-10 DIAGNOSIS — D63.1 ANEMIA IN CHRONIC KIDNEY DISEASE, UNSPECIFIED CKD STAGE: ICD-10-CM

## 2023-02-10 DIAGNOSIS — N18.9 ANEMIA IN CHRONIC KIDNEY DISEASE, UNSPECIFIED CKD STAGE: ICD-10-CM

## 2023-02-10 DIAGNOSIS — N18.30 ANEMIA IN STAGE 3 CHRONIC KIDNEY DISEASE, UNSPECIFIED WHETHER STAGE 3A OR 3B CKD: Primary | ICD-10-CM

## 2023-02-10 DIAGNOSIS — D63.1 ANEMIA IN STAGE 3 CHRONIC KIDNEY DISEASE, UNSPECIFIED WHETHER STAGE 3A OR 3B CKD: Primary | ICD-10-CM

## 2023-02-10 DIAGNOSIS — D50.9 IRON DEFICIENCY ANEMIA, UNSPECIFIED IRON DEFICIENCY ANEMIA TYPE: ICD-10-CM

## 2023-02-10 PROCEDURE — 63600175 PHARM REV CODE 636 W HCPCS: Mod: JG,EC | Performed by: INTERNAL MEDICINE

## 2023-02-10 PROCEDURE — 96372 THER/PROPH/DIAG INJ SC/IM: CPT

## 2023-02-10 RX ADMIN — EPOETIN ALFA-EPBX 20000 UNITS: 20000 INJECTION, SOLUTION INTRAVENOUS; SUBCUTANEOUS at 11:02

## 2023-02-10 NOTE — PLAN OF CARE
Pt received q2w RETAcrit, [id number]. Labs done prior to arrival. Hgb 10.7 today. Pt tolerated injection well. Has next appointments. Discharged from unit using motor scooter.

## 2023-02-15 ENCOUNTER — PES CALL (OUTPATIENT)
Dept: ADMINISTRATIVE | Facility: CLINIC | Age: 78
End: 2023-02-15
Payer: MEDICARE

## 2023-02-24 ENCOUNTER — INFUSION (OUTPATIENT)
Dept: INFUSION THERAPY | Facility: HOSPITAL | Age: 78
End: 2023-02-24
Attending: INTERNAL MEDICINE
Payer: MEDICARE

## 2023-02-24 VITALS
DIASTOLIC BLOOD PRESSURE: 74 MMHG | SYSTOLIC BLOOD PRESSURE: 168 MMHG | TEMPERATURE: 98 F | RESPIRATION RATE: 16 BRPM | HEART RATE: 91 BPM | OXYGEN SATURATION: 95 %

## 2023-02-24 DIAGNOSIS — Z53.1 REFUSAL OF BLOOD TRANSFUSIONS AS PATIENT IS JEHOVAH'S WITNESS: ICD-10-CM

## 2023-02-24 DIAGNOSIS — N18.9 ANEMIA IN CHRONIC KIDNEY DISEASE, UNSPECIFIED CKD STAGE: ICD-10-CM

## 2023-02-24 DIAGNOSIS — D63.1 ANEMIA IN STAGE 3 CHRONIC KIDNEY DISEASE, UNSPECIFIED WHETHER STAGE 3A OR 3B CKD: Primary | ICD-10-CM

## 2023-02-24 DIAGNOSIS — D63.1 ANEMIA IN CHRONIC KIDNEY DISEASE, UNSPECIFIED CKD STAGE: ICD-10-CM

## 2023-02-24 DIAGNOSIS — D50.9 IRON DEFICIENCY ANEMIA, UNSPECIFIED IRON DEFICIENCY ANEMIA TYPE: ICD-10-CM

## 2023-02-24 DIAGNOSIS — N18.30 ANEMIA IN STAGE 3 CHRONIC KIDNEY DISEASE, UNSPECIFIED WHETHER STAGE 3A OR 3B CKD: Primary | ICD-10-CM

## 2023-02-24 PROCEDURE — 63600175 PHARM REV CODE 636 W HCPCS: Mod: JG | Performed by: INTERNAL MEDICINE

## 2023-02-24 PROCEDURE — 96372 THER/PROPH/DIAG INJ SC/IM: CPT

## 2023-02-24 RX ADMIN — EPOETIN ALFA-EPBX 20000 UNITS: 20000 INJECTION, SOLUTION INTRAVENOUS; SUBCUTANEOUS at 11:02

## 2023-02-24 NOTE — PLAN OF CARE
Patient arrived to unit for Retacrit 20,000 injection. Most recent hgb 9.8. VSS. Patient tolerated injection well. Patient discharged in no signs of acute distress via motorized scooter.

## 2023-03-06 ENCOUNTER — PES CALL (OUTPATIENT)
Dept: ADMINISTRATIVE | Facility: CLINIC | Age: 78
End: 2023-03-06
Payer: MEDICARE

## 2023-03-07 ENCOUNTER — NURSE TRIAGE (OUTPATIENT)
Dept: ADMINISTRATIVE | Facility: CLINIC | Age: 78
End: 2023-03-07
Payer: MEDICARE

## 2023-03-08 ENCOUNTER — TELEPHONE (OUTPATIENT)
Dept: FAMILY MEDICINE | Facility: CLINIC | Age: 78
End: 2023-03-08
Payer: MEDICARE

## 2023-03-08 VITALS — DIASTOLIC BLOOD PRESSURE: 91 MMHG | SYSTOLIC BLOOD PRESSURE: 144 MMHG

## 2023-03-08 NOTE — TELEPHONE ENCOUNTER
Called pt daughter Maynor to check , wasn't;t there . Pt answered says she isn't feeling good . Has muscle aches , barely walking , constipation and unable to urinate . Pt reports that this has been going for 2 months , even when taking lactosive . Mostly lays in bed all day due to pain . Had her check bp while over the phone , she was lying in bed . 144/91 , pt reports having blackouts off and on .  It may be the seizures, doesn't remember them . Informed pt t recheck bp in a hour and we'll call for reading also to speak with daughter . She's scheduled for 5/15 f/uy please advise if apt can be pushed up .

## 2023-03-08 NOTE — TELEPHONE ENCOUNTER
Please see if we can get in touch with her daughter    She needs to go to the ED for evaluation    Micaela Mendoza MD     show

## 2023-03-08 NOTE — TELEPHONE ENCOUNTER
Called pt daughter Maynor and advised she bring mother to ED . Says she will as soon as she gets home .

## 2023-03-08 NOTE — TELEPHONE ENCOUNTER
"Patient daughter calling on behalf of patient. Daughter states she checked pt BP and it was 67/45. Daughter states patient was "out of it". Avised daughter to retake BP during call. BP 76/47 HR 84. Daughter reports patient is clammy and disoriented. Patient is alert and talking at this time. Denies chest pain, dizziness or weakness.Advised patient of dispo to call 911. Verbalized understanding. Advised to call back if symptoms become worse or with further questions.      Reason for Disposition   Shock suspected (e.g., cold/pale/clammy skin, too weak to stand, low BP, rapid pulse)    Additional Information   Negative: Started suddenly after an allergic medicine, an allergic food, or bee sting    Protocols used: Blood Pressure - Low-A-AH    "

## 2023-03-10 ENCOUNTER — INFUSION (OUTPATIENT)
Dept: INFUSION THERAPY | Facility: HOSPITAL | Age: 78
End: 2023-03-10
Attending: INTERNAL MEDICINE
Payer: MEDICARE

## 2023-03-10 VITALS
OXYGEN SATURATION: 95 % | RESPIRATION RATE: 17 BRPM | HEART RATE: 97 BPM | SYSTOLIC BLOOD PRESSURE: 143 MMHG | DIASTOLIC BLOOD PRESSURE: 66 MMHG

## 2023-03-10 DIAGNOSIS — D63.1 ANEMIA IN STAGE 3 CHRONIC KIDNEY DISEASE, UNSPECIFIED WHETHER STAGE 3A OR 3B CKD: Primary | ICD-10-CM

## 2023-03-10 DIAGNOSIS — D63.1 ANEMIA IN CHRONIC KIDNEY DISEASE, UNSPECIFIED CKD STAGE: ICD-10-CM

## 2023-03-10 DIAGNOSIS — D50.9 IRON DEFICIENCY ANEMIA, UNSPECIFIED IRON DEFICIENCY ANEMIA TYPE: ICD-10-CM

## 2023-03-10 DIAGNOSIS — Z53.1 REFUSAL OF BLOOD TRANSFUSIONS AS PATIENT IS JEHOVAH'S WITNESS: ICD-10-CM

## 2023-03-10 DIAGNOSIS — N18.30 ANEMIA IN STAGE 3 CHRONIC KIDNEY DISEASE, UNSPECIFIED WHETHER STAGE 3A OR 3B CKD: Primary | ICD-10-CM

## 2023-03-10 DIAGNOSIS — N18.9 ANEMIA IN CHRONIC KIDNEY DISEASE, UNSPECIFIED CKD STAGE: ICD-10-CM

## 2023-03-10 PROCEDURE — 96372 THER/PROPH/DIAG INJ SC/IM: CPT

## 2023-03-10 PROCEDURE — 63600175 PHARM REV CODE 636 W HCPCS: Mod: JZ,JG,EC | Performed by: INTERNAL MEDICINE

## 2023-03-10 RX ADMIN — EPOETIN ALFA-EPBX 20000 UNITS: 20000 INJECTION, SOLUTION INTRAVENOUS; SUBCUTANEOUS at 11:03

## 2023-03-10 NOTE — PLAN OF CARE
Patient arrived to unit for Retacrit 20,000 injection. Most recent hgb 10.1 VSS. Patient tolerated injection well. Patient discharged in no signs of acute distress via motorized scooter.

## 2023-03-22 ENCOUNTER — LAB VISIT (OUTPATIENT)
Dept: LAB | Facility: HOSPITAL | Age: 78
End: 2023-03-22
Attending: INTERNAL MEDICINE
Payer: MEDICARE

## 2023-03-22 ENCOUNTER — OFFICE VISIT (OUTPATIENT)
Dept: HEMATOLOGY/ONCOLOGY | Facility: CLINIC | Age: 78
End: 2023-03-22
Payer: MEDICARE

## 2023-03-22 VITALS
BODY MASS INDEX: 23.32 KG/M2 | SYSTOLIC BLOOD PRESSURE: 159 MMHG | OXYGEN SATURATION: 90 % | HEIGHT: 62 IN | HEART RATE: 96 BPM | WEIGHT: 126.75 LBS | DIASTOLIC BLOOD PRESSURE: 84 MMHG

## 2023-03-22 DIAGNOSIS — D50.9 IRON DEFICIENCY ANEMIA, UNSPECIFIED IRON DEFICIENCY ANEMIA TYPE: ICD-10-CM

## 2023-03-22 DIAGNOSIS — N18.9 ANEMIA IN CHRONIC KIDNEY DISEASE, UNSPECIFIED CKD STAGE: Primary | ICD-10-CM

## 2023-03-22 DIAGNOSIS — D86.9 SARCOIDOSIS: ICD-10-CM

## 2023-03-22 DIAGNOSIS — N18.9 CHRONIC KIDNEY DISEASE, UNSPECIFIED CKD STAGE: ICD-10-CM

## 2023-03-22 DIAGNOSIS — Z53.1 REFUSAL OF BLOOD TRANSFUSIONS AS PATIENT IS JEHOVAH'S WITNESS: ICD-10-CM

## 2023-03-22 DIAGNOSIS — D63.1 ANEMIA IN CHRONIC KIDNEY DISEASE, UNSPECIFIED CKD STAGE: Primary | ICD-10-CM

## 2023-03-22 DIAGNOSIS — N18.9 CKD (CHRONIC KIDNEY DISEASE): ICD-10-CM

## 2023-03-22 LAB
BASOPHILS # BLD AUTO: 0.01 K/UL (ref 0–0.2)
BASOPHILS NFR BLD: 0.3 % (ref 0–1.9)
DIFFERENTIAL METHOD: ABNORMAL
EOSINOPHIL # BLD AUTO: 0.1 K/UL (ref 0–0.5)
EOSINOPHIL NFR BLD: 2 % (ref 0–8)
ERYTHROCYTE [DISTWIDTH] IN BLOOD BY AUTOMATED COUNT: 14.1 % (ref 11.5–14.5)
FERRITIN SERPL-MCNC: 312 NG/ML (ref 20–300)
HCT VFR BLD AUTO: 30.8 % (ref 37–48.5)
HGB BLD-MCNC: 9.9 G/DL (ref 12–16)
IMM GRANULOCYTES # BLD AUTO: 0.02 K/UL (ref 0–0.04)
IMM GRANULOCYTES NFR BLD AUTO: 0.7 % (ref 0–0.5)
IRON SERPL-MCNC: 89 UG/DL (ref 30–160)
LYMPHOCYTES # BLD AUTO: 1.2 K/UL (ref 1–4.8)
LYMPHOCYTES NFR BLD: 39.9 % (ref 18–48)
MCH RBC QN AUTO: 32.5 PG (ref 27–31)
MCHC RBC AUTO-ENTMCNC: 32.1 G/DL (ref 32–36)
MCV RBC AUTO: 101 FL (ref 82–98)
MONOCYTES # BLD AUTO: 0.5 K/UL (ref 0.3–1)
MONOCYTES NFR BLD: 17.3 % (ref 4–15)
NEUTROPHILS # BLD AUTO: 1.2 K/UL (ref 1.8–7.7)
NEUTROPHILS NFR BLD: 39.8 % (ref 38–73)
NRBC BLD-RTO: 0 /100 WBC
PLATELET # BLD AUTO: 263 K/UL (ref 150–450)
PMV BLD AUTO: 7.9 FL (ref 9.2–12.9)
RBC # BLD AUTO: 3.05 M/UL (ref 4–5.4)
SATURATED IRON: 24 % (ref 20–50)
TOTAL IRON BINDING CAPACITY: 370 UG/DL (ref 250–450)
TRANSFERRIN SERPL-MCNC: 250 MG/DL (ref 200–375)
WBC # BLD AUTO: 3.01 K/UL (ref 3.9–12.7)

## 2023-03-22 PROCEDURE — 1157F PR ADVANCE CARE PLAN OR EQUIV PRESENT IN MEDICAL RECORD: ICD-10-PCS | Mod: CPTII,S$GLB,, | Performed by: INTERNAL MEDICINE

## 2023-03-22 PROCEDURE — 84466 ASSAY OF TRANSFERRIN: CPT | Performed by: INTERNAL MEDICINE

## 2023-03-22 PROCEDURE — 36415 COLL VENOUS BLD VENIPUNCTURE: CPT | Performed by: INTERNAL MEDICINE

## 2023-03-22 PROCEDURE — 3072F PR LOW RISK FOR RETINOPATHY: ICD-10-PCS | Mod: CPTII,S$GLB,, | Performed by: INTERNAL MEDICINE

## 2023-03-22 PROCEDURE — 99999 PR PBB SHADOW E&M-EST. PATIENT-LVL III: CPT | Mod: PBBFAC,,, | Performed by: INTERNAL MEDICINE

## 2023-03-22 PROCEDURE — 3079F PR MOST RECENT DIASTOLIC BLOOD PRESSURE 80-89 MM HG: ICD-10-PCS | Mod: CPTII,S$GLB,, | Performed by: INTERNAL MEDICINE

## 2023-03-22 PROCEDURE — 3288F FALL RISK ASSESSMENT DOCD: CPT | Mod: CPTII,S$GLB,, | Performed by: INTERNAL MEDICINE

## 2023-03-22 PROCEDURE — 3288F PR FALLS RISK ASSESSMENT DOCUMENTED: ICD-10-PCS | Mod: CPTII,S$GLB,, | Performed by: INTERNAL MEDICINE

## 2023-03-22 PROCEDURE — 1157F ADVNC CARE PLAN IN RCRD: CPT | Mod: CPTII,S$GLB,, | Performed by: INTERNAL MEDICINE

## 2023-03-22 PROCEDURE — 1101F PR PT FALLS ASSESS DOC 0-1 FALLS W/OUT INJ PAST YR: ICD-10-PCS | Mod: CPTII,S$GLB,, | Performed by: INTERNAL MEDICINE

## 2023-03-22 PROCEDURE — 85025 COMPLETE CBC W/AUTO DIFF WBC: CPT | Performed by: INTERNAL MEDICINE

## 2023-03-22 PROCEDURE — 99999 PR PBB SHADOW E&M-EST. PATIENT-LVL III: ICD-10-PCS | Mod: PBBFAC,,, | Performed by: INTERNAL MEDICINE

## 2023-03-22 PROCEDURE — 1125F AMNT PAIN NOTED PAIN PRSNT: CPT | Mod: CPTII,S$GLB,, | Performed by: INTERNAL MEDICINE

## 2023-03-22 PROCEDURE — 3077F PR MOST RECENT SYSTOLIC BLOOD PRESSURE >= 140 MM HG: ICD-10-PCS | Mod: CPTII,S$GLB,, | Performed by: INTERNAL MEDICINE

## 2023-03-22 PROCEDURE — 3072F LOW RISK FOR RETINOPATHY: CPT | Mod: CPTII,S$GLB,, | Performed by: INTERNAL MEDICINE

## 2023-03-22 PROCEDURE — 1101F PT FALLS ASSESS-DOCD LE1/YR: CPT | Mod: CPTII,S$GLB,, | Performed by: INTERNAL MEDICINE

## 2023-03-22 PROCEDURE — 82728 ASSAY OF FERRITIN: CPT | Performed by: INTERNAL MEDICINE

## 2023-03-22 PROCEDURE — 99214 PR OFFICE/OUTPT VISIT, EST, LEVL IV, 30-39 MIN: ICD-10-PCS | Mod: S$GLB,,, | Performed by: INTERNAL MEDICINE

## 2023-03-22 PROCEDURE — 3077F SYST BP >= 140 MM HG: CPT | Mod: CPTII,S$GLB,, | Performed by: INTERNAL MEDICINE

## 2023-03-22 PROCEDURE — 3079F DIAST BP 80-89 MM HG: CPT | Mod: CPTII,S$GLB,, | Performed by: INTERNAL MEDICINE

## 2023-03-22 PROCEDURE — 99214 OFFICE O/P EST MOD 30 MIN: CPT | Mod: S$GLB,,, | Performed by: INTERNAL MEDICINE

## 2023-03-22 PROCEDURE — 1125F PR PAIN SEVERITY QUANTIFIED, PAIN PRESENT: ICD-10-PCS | Mod: CPTII,S$GLB,, | Performed by: INTERNAL MEDICINE

## 2023-03-22 NOTE — PROGRESS NOTES
Subjective:        Patient ID: Regino Lawrence is a 77 y.o. female.    Chief Complaint: Follow-up anemia      Diagnosis: Anemia in CKD  Patient is a Judaism  .  Prior Hx;  The patient is seen today for f/u  chronic anemia in CKD undergoing EPO injections.The patient reports that she has been diagnosed with JOLLY in the past.  She has been on oral iron supplementation therapy, but could not tolerate or did not respond, she is uncertainShe also has  undergone intermittent IV iron therapy.  She is followed by GI and has undergone a colonoscopy earlier this year and was diagnosed with hemorrhoids for which she underwent banding procedure.  No melena, hematochezia,change in bowel habits.  She has also been diagnosed with B12 deficiency in the past, but reports she did not respond to B12 injections. No history of blood transfusions.  She is a Judaism.  She reports that she remembers getting injections in the 1970s when her blood count was low.   She is followed by Rheumatology for history of sarcoidosis with associated myopathy and arthropathy. She has been treated in the  past with methotrexate and Plaquenil, both of which were ineffectiveCellcept and imuran caused some unknown side effect. She is followed by PCP for DMShe was admitted 6/2022 with worsening SOB         Interval Hx;   She continues with Chronic diffuse arthralgias worse in cold weather  Followed by pain management and Rheumatology   In [facility]  She is undergoing epo inj q 2wks  Hb 9.9g/dL on 3/22/23  No CP/cough  Chronic Mild fatigue  Mild FAUSTIN-stable  No melena, hematochezia or change in bowel habits  She also has  undergone intermittent IV iron therapy  She also has history of cervical spinal stenosis s/p posterior C3-C7 laminectomy and fusion on 11/16/15 by Dr. Conner.  She has received COVD vaccination       PAST MEDICAL HISTORY:  Acid reflux, alopecia, anemia, anxiety disorder, chronic  kidney disease, depression,  "diabetes mellitus type 2, hyperlipidemia,  hypertension, hypothyroidism, osteoporosis, sarcoidosis.    PAST SURGICAL HISTORY:  Cholecystectomy, , tubal ligation, carpal tunnel release, cataracts.    FAMILY HISTORY: Unremarkable for cancer. Significant for HTN.       Review of Systems   Constitutional:  Positive for fatigue (chronic, mild). Negative for activity change and appetite change.   HENT:  Negative for hearing loss and nosebleeds.    Eyes:  Negative for visual disturbance.   Respiratory:  Negative for cough and shortness of breath.    Cardiovascular:  Negative for chest pain and leg swelling.   Gastrointestinal:  Negative for abdominal pain, constipation, diarrhea and nausea.   Genitourinary:  Negative for flank pain and urgency.   Musculoskeletal:  Positive for arthralgias, back pain and myalgias. Negative for gait problem and joint swelling.   Skin:  Negative for rash.        No petechiae, ecchymoses   Neurological:  Negative for weakness, light-headedness and headaches.   Hematological:  Negative for adenopathy. Does not bruise/bleed easily.     Objective:         Vitals:    23 1407   BP: (!) 159/84   BP Location: Left arm   Patient Position: Sitting   BP Method: Large (Automatic)   Pulse: 96   SpO2: (!) 90%   Weight: 57.5 kg (126 lb 12.2 oz)   Height: 5' 2" (1.575 m)       Vitals:    23 1407   BP: (!) 159/84   BP Location: Left arm   Patient Position: Sitting   BP Method: Large (Automatic)   Pulse: 96   SpO2: (!) 90%   Weight: 57.5 kg (126 lb 12.2 oz)   Height: 5' 2" (1.575 m)           .Physical Exam   Constitutional: She is oriented to person, place, and time. She appears well-developed and well-nourished.   HENT:   Head: Normocephalic.   Eyes: Conjunctivae and lids are normal.No scleral icterus.   Neck: Normal range of motion. Neck supple. No thyromegaly present.   Cardiovascular: Normal rate, regular rhythm and normal heart sounds.    No murmur heard.  Pulmonary/Chest: Breath " sounds normal. She has no wheezes. She has no rales.   Abdominal: Soft. Bowel sounds are normal. There is no tenderness. There is no rebound and no guarding.   Musculoskeletal: Ambulates w/assistance of motorized scooter  Neurological: She is alert and oriented to person, place, and time. No cranial nerve deficit.  Skin: Skin is warm and dry. No ecchymosis, no petechiae and no rash noted. No erythema.   Psychiatric: She has a normal mood and affect.               Lab Results   Component Value Date    WBC 3.01 (L) 03/22/2023    HGB 9.9 (L) 03/22/2023    HCT 30.8 (L) 03/22/2023     (H) 03/22/2023     03/22/2023     Lab Results   Component Value Date    IRON 89 03/22/2023    TIBC 370 03/22/2023    FERRITIN 312 (H) 03/22/2023     SPEP-nl      CT renal 3/6/2017   IMPRESSION:  1.  No renal, ureteral or bladder calculi.  No hydronephrosis or ureterectasis.  2.  Poorly distended bladder.  Mild bladder wall prominence.  Mild cystitis cannot be   excluded.  3.  Moderate constipation.  Normal appendix      Lab Results   Component Value Date    IRON 89 03/22/2023    TIBC 370 03/22/2023    FERRITIN 312 (H) 03/22/2023     Lab Results   Component Value Date    WBC 3.01 (L) 03/22/2023    HGB 9.9 (L) 03/22/2023    HCT 30.8 (L) 03/22/2023     (H) 03/22/2023     03/22/2023         Assessment:       1. Anemia in chronic kidney disease, unspecified CKD stage    2. Iron deficiency anemia, unspecified iron deficiency anemia type    3. Refusal of blood transfusions as patient is Taoist    4. Chronic kidney disease, unspecified CKD stage    5. Sarcoidosis              Plan:   1.2.,3.    Pt is a Anglican and declines/not interested in blood and blood products due to Latter day beliefs  She is undergoing epo inj q 2wks  Hb 11 g/dL on 12/9/22 to 9.9 on 3/22/23     Pt followed by Nephrology . It has been determined early CKD stage III, suspect due to age-related renal nephron loss, along with  possible diabetic nephropathy v. hypertensive nephrosclerosis  CBC q2wks STANDING  Increase EPO dosage  inj q 2wks( pending lab parameters)  Continue periodic monitoring of Fe studies  According to KIDIGO guidelines patients with CKD , a  gfr , <60 cc/min and anemia, iron therapy is appropriate if the Tsat is < 30 and ferritin < 500 mg/dl.    Check Fe  Follow-up with PCP for med mgmt    4 f/b nephrology    5. F/b Rheumatology    CBC q2wks STANDING  Cont EPO  inj q 2wks( pending lab parameters) at increased dosage  F/u in 3mos with cbc, Fe studies prior to f/u       Referral to ED  Advance Care Planning     Power of   I previously initiated the process of advance care planning today and explained the importance of this process to the patient.  I introduced the concept of advance directives to the patient, as well. Then the patient received detailed information about the importance of designating a Health Care Power of  (HCPOA). She was also instructed to communicate with this person about their wishes for future healthcare, should she become sick and lose decision-making capacity. The patient has  previously appointed a HCPOA. Pt completed in 2015           CC: Micaela Mendoza M.D.

## 2023-03-23 ENCOUNTER — PATIENT MESSAGE (OUTPATIENT)
Dept: ADMINISTRATIVE | Facility: HOSPITAL | Age: 78
End: 2023-03-23
Payer: MEDICARE

## 2023-03-24 ENCOUNTER — INFUSION (OUTPATIENT)
Dept: INFUSION THERAPY | Facility: HOSPITAL | Age: 78
End: 2023-03-24
Attending: INTERNAL MEDICINE
Payer: MEDICARE

## 2023-03-24 VITALS
DIASTOLIC BLOOD PRESSURE: 74 MMHG | RESPIRATION RATE: 17 BRPM | OXYGEN SATURATION: 95 % | HEART RATE: 101 BPM | TEMPERATURE: 98 F | SYSTOLIC BLOOD PRESSURE: 153 MMHG

## 2023-03-24 DIAGNOSIS — N18.30 ANEMIA IN STAGE 3 CHRONIC KIDNEY DISEASE, UNSPECIFIED WHETHER STAGE 3A OR 3B CKD: Primary | ICD-10-CM

## 2023-03-24 DIAGNOSIS — D63.1 ANEMIA IN STAGE 3 CHRONIC KIDNEY DISEASE, UNSPECIFIED WHETHER STAGE 3A OR 3B CKD: Primary | ICD-10-CM

## 2023-03-24 DIAGNOSIS — Z53.1 REFUSAL OF BLOOD TRANSFUSIONS AS PATIENT IS JEHOVAH'S WITNESS: ICD-10-CM

## 2023-03-24 PROCEDURE — 96372 THER/PROPH/DIAG INJ SC/IM: CPT

## 2023-03-24 PROCEDURE — 63600175 PHARM REV CODE 636 W HCPCS: Mod: JZ,JG | Performed by: INTERNAL MEDICINE

## 2023-03-24 RX ADMIN — EPOETIN ALFA-EPBX 40000 UNITS: 40000 INJECTION, SOLUTION INTRAVENOUS; SUBCUTANEOUS at 11:03

## 2023-03-24 NOTE — PLAN OF CARE
Recent hgb 9.9. Retacrit dose increased to 40,000 units. VSS. Patient tolerated injection well. Discharged off unit via motor scooter.

## 2023-03-31 ENCOUNTER — TELEPHONE (OUTPATIENT)
Dept: ADMINISTRATIVE | Facility: CLINIC | Age: 78
End: 2023-03-31
Payer: MEDICARE

## 2023-03-31 NOTE — TELEPHONE ENCOUNTER
Called pt, informed pt I was calling to remind pt of her upcoming in office EAWV appt; could not confirm appt date and time with pt because pt refused to verify her name and date of birth.

## 2023-04-03 ENCOUNTER — OFFICE VISIT (OUTPATIENT)
Dept: FAMILY MEDICINE | Facility: CLINIC | Age: 78
End: 2023-04-03
Payer: MEDICARE

## 2023-04-03 VITALS
BODY MASS INDEX: 22.09 KG/M2 | HEIGHT: 62 IN | HEART RATE: 100 BPM | WEIGHT: 120.06 LBS | DIASTOLIC BLOOD PRESSURE: 72 MMHG | SYSTOLIC BLOOD PRESSURE: 148 MMHG | TEMPERATURE: 98 F | OXYGEN SATURATION: 95 %

## 2023-04-03 DIAGNOSIS — E11.3292 CONTROLLED TYPE 2 DIABETES MELLITUS WITH LEFT EYE AFFECTED BY MILD NONPROLIFERATIVE RETINOPATHY WITHOUT MACULAR EDEMA, WITHOUT LONG-TERM CURRENT USE OF INSULIN: Chronic | ICD-10-CM

## 2023-04-03 DIAGNOSIS — I10 ESSENTIAL HYPERTENSION: Chronic | ICD-10-CM

## 2023-04-03 DIAGNOSIS — E03.9 HYPOTHYROIDISM, UNSPECIFIED TYPE: Chronic | ICD-10-CM

## 2023-04-03 DIAGNOSIS — Z00.00 ENCOUNTER FOR PREVENTIVE HEALTH EXAMINATION: Primary | ICD-10-CM

## 2023-04-03 DIAGNOSIS — N18.30 DIABETES MELLITUS WITH STAGE 3 CHRONIC KIDNEY DISEASE: ICD-10-CM

## 2023-04-03 DIAGNOSIS — Z53.1 REFUSAL OF BLOOD TRANSFUSIONS AS PATIENT IS JEHOVAH'S WITNESS: ICD-10-CM

## 2023-04-03 DIAGNOSIS — I70.0 AORTIC ATHEROSCLEROSIS: ICD-10-CM

## 2023-04-03 DIAGNOSIS — N18.31 STAGE 3A CHRONIC KIDNEY DISEASE: ICD-10-CM

## 2023-04-03 DIAGNOSIS — K21.9 GASTROESOPHAGEAL REFLUX DISEASE, UNSPECIFIED WHETHER ESOPHAGITIS PRESENT: ICD-10-CM

## 2023-04-03 DIAGNOSIS — Z79.4 LONG-TERM INSULIN USE: ICD-10-CM

## 2023-04-03 DIAGNOSIS — E11.69 COMBINED HYPERLIPIDEMIA ASSOCIATED WITH TYPE 2 DIABETES MELLITUS: Chronic | ICD-10-CM

## 2023-04-03 DIAGNOSIS — D86.9 SARCOIDOSIS: Chronic | ICD-10-CM

## 2023-04-03 DIAGNOSIS — M46.1 SACROILIITIS: ICD-10-CM

## 2023-04-03 DIAGNOSIS — M85.80 OSTEOPENIA, UNSPECIFIED LOCATION: ICD-10-CM

## 2023-04-03 DIAGNOSIS — F41.1 GENERALIZED ANXIETY DISORDER: ICD-10-CM

## 2023-04-03 DIAGNOSIS — D63.1 ANEMIA IN STAGE 3A CHRONIC KIDNEY DISEASE: ICD-10-CM

## 2023-04-03 DIAGNOSIS — E11.22 DIABETES MELLITUS WITH STAGE 3 CHRONIC KIDNEY DISEASE: ICD-10-CM

## 2023-04-03 DIAGNOSIS — G56.03 BILATERAL CARPAL TUNNEL SYNDROME: ICD-10-CM

## 2023-04-03 DIAGNOSIS — E11.9 DM TYPE 2 WITHOUT RETINOPATHY: ICD-10-CM

## 2023-04-03 DIAGNOSIS — R56.9 SEIZURE: ICD-10-CM

## 2023-04-03 DIAGNOSIS — E78.2 COMBINED HYPERLIPIDEMIA ASSOCIATED WITH TYPE 2 DIABETES MELLITUS: Chronic | ICD-10-CM

## 2023-04-03 DIAGNOSIS — N18.31 ANEMIA IN STAGE 3A CHRONIC KIDNEY DISEASE: ICD-10-CM

## 2023-04-03 PROCEDURE — 1159F MED LIST DOCD IN RCRD: CPT | Mod: CPTII,S$GLB,, | Performed by: NURSE PRACTITIONER

## 2023-04-03 PROCEDURE — 1160F RVW MEDS BY RX/DR IN RCRD: CPT | Mod: CPTII,S$GLB,, | Performed by: NURSE PRACTITIONER

## 2023-04-03 PROCEDURE — 1170F PR FUNCTIONAL STATUS ASSESSED: ICD-10-PCS | Mod: CPTII,S$GLB,, | Performed by: NURSE PRACTITIONER

## 2023-04-03 PROCEDURE — 1160F PR REVIEW ALL MEDS BY PRESCRIBER/CLIN PHARMACIST DOCUMENTED: ICD-10-PCS | Mod: CPTII,S$GLB,, | Performed by: NURSE PRACTITIONER

## 2023-04-03 PROCEDURE — 3077F SYST BP >= 140 MM HG: CPT | Mod: CPTII,S$GLB,, | Performed by: NURSE PRACTITIONER

## 2023-04-03 PROCEDURE — 3078F DIAST BP <80 MM HG: CPT | Mod: CPTII,S$GLB,, | Performed by: NURSE PRACTITIONER

## 2023-04-03 PROCEDURE — 3078F PR MOST RECENT DIASTOLIC BLOOD PRESSURE < 80 MM HG: ICD-10-PCS | Mod: CPTII,S$GLB,, | Performed by: NURSE PRACTITIONER

## 2023-04-03 PROCEDURE — G0439 PPPS, SUBSEQ VISIT: HCPCS | Mod: S$GLB,,, | Performed by: NURSE PRACTITIONER

## 2023-04-03 PROCEDURE — 1157F PR ADVANCE CARE PLAN OR EQUIV PRESENT IN MEDICAL RECORD: ICD-10-PCS | Mod: CPTII,S$GLB,, | Performed by: NURSE PRACTITIONER

## 2023-04-03 PROCEDURE — 1170F FXNL STATUS ASSESSED: CPT | Mod: CPTII,S$GLB,, | Performed by: NURSE PRACTITIONER

## 2023-04-03 PROCEDURE — 1159F PR MEDICATION LIST DOCUMENTED IN MEDICAL RECORD: ICD-10-PCS | Mod: CPTII,S$GLB,, | Performed by: NURSE PRACTITIONER

## 2023-04-03 PROCEDURE — 3072F LOW RISK FOR RETINOPATHY: CPT | Mod: CPTII,S$GLB,, | Performed by: NURSE PRACTITIONER

## 2023-04-03 PROCEDURE — 99999 PR PBB SHADOW E&M-EST. PATIENT-LVL V: CPT | Mod: PBBFAC,,, | Performed by: NURSE PRACTITIONER

## 2023-04-03 PROCEDURE — G0439 PR MEDICARE ANNUAL WELLNESS SUBSEQUENT VISIT: ICD-10-PCS | Mod: S$GLB,,, | Performed by: NURSE PRACTITIONER

## 2023-04-03 PROCEDURE — 3072F PR LOW RISK FOR RETINOPATHY: ICD-10-PCS | Mod: CPTII,S$GLB,, | Performed by: NURSE PRACTITIONER

## 2023-04-03 PROCEDURE — 99999 PR PBB SHADOW E&M-EST. PATIENT-LVL V: ICD-10-PCS | Mod: PBBFAC,,, | Performed by: NURSE PRACTITIONER

## 2023-04-03 PROCEDURE — 3077F PR MOST RECENT SYSTOLIC BLOOD PRESSURE >= 140 MM HG: ICD-10-PCS | Mod: CPTII,S$GLB,, | Performed by: NURSE PRACTITIONER

## 2023-04-03 PROCEDURE — 1157F ADVNC CARE PLAN IN RCRD: CPT | Mod: CPTII,S$GLB,, | Performed by: NURSE PRACTITIONER

## 2023-04-03 NOTE — PROGRESS NOTES
"  Regino Lawrence presented for a  Medicare AWV and comprehensive Health Risk Assessment today. The following components were reviewed and updated:    Medical history  Family History  Social history  Allergies and Current Medications  Health Risk Assessment  Health Maintenance  Care Team       ** See Completed Assessments for Annual Wellness Visit within the encounter summary.**       The following assessments were completed:  Living Situation  CAGE  Depression Screening  Timed Get Up and Go  Whisper Test  Cognitive Function Screening  Nutrition Screening  ADL Screening  PAQ Screening          Vitals:    04/03/23 1107 04/03/23 1139   BP: (!) 148/72    Pulse: 108 100   Temp: 97.8 °F (36.6 °C)    TempSrc: Oral    SpO2: 95%    Weight: 54.5 kg (120 lb 0.7 oz)    Height: 5' 2" (1.575 m)      Body mass index is 21.96 kg/m².  Physical Exam  Vitals and nursing note reviewed.   Constitutional:       Appearance: Normal appearance.   Cardiovascular:      Rate and Rhythm: Normal rate.      Pulses: Normal pulses.      Heart sounds: Normal heart sounds.   Pulmonary:      Effort: Pulmonary effort is normal.      Breath sounds: Normal breath sounds.   Musculoskeletal:      Comments: arrived using a motorized scooter   Neurological:      Mental Status: She is alert and oriented to person, place, and time.   Psychiatric:         Mood and Affect: Mood normal.         Behavior: Behavior normal.           Diagnoses and health risks identified today and associated recommendations/orders:    1. Encounter for preventive health examination  Pt was seen today for an Annual Wellness visit. Healthcare maintenance and screening recommendations were discussed and updated as indicated. Return in one year for AWV.    Review current opioid prescriptions:n/a  Screen for potential Substance Use Disorders:n/a    2. Aortic atherosclerosis  The current medical regimen is effective;  continue present plan and medications.    3. Seizure  The current medical " regimen is effective;  continue present plan and medications.    4. 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 DM type 2 without retinopathy  6. Combined 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  7. Diabetes mellitus with stage 3 chronic kidney disease  The current medical regimen is effective;  continue present plan and medications.  Hemoglobin A1C   Date Value Ref Range Status   12/09/2022 5.2 4.0 - 5.6 % Final             04/04/2022 5.1 4.0 - 5.6 % Final             07/08/2021 5.6 4.0 - 5.6 % Final             8. Stage 3a chronic kidney disease  The current medical regimen is effective;  continue present plan and medications.    9. Long-term insulin use  The current medical regimen is effective;  continue present plan and medications.    10. Anemia in stage 3a chronic kidney disease  The current medical regimen is effective;  continue present plan and medications.    11. Sacroiliitis  The current medical regimen is effective;  continue present plan and medications.    12. Sarcoidosis  The current medical regimen is effective;  continue present plan and medications.    13. Essential hypertension  The current medical regimen is effective;  continue present plan and medications.    14. Bilateral carpal tunnel syndrome  The current medical regimen is effective;  continue present plan and medications.    15. Generalized anxiety disorder  The current medical regimen is effective;  continue present plan and medications.    16. Hypothyroidism, unspecified type  The current medical regimen is effective;  continue present plan and medications.    17. Gastroesophageal reflux disease, unspecified whether esophagitis present  The current medical regimen is effective;  continue present plan and medications.    18. Osteopenia, unspecified location  The current medical regimen is effective;   continue present plan and medications.    19. Refusal of blood transfusions as patient is [place]  The current medical regimen is effective;  continue present plan and medications.        Provided Regino with a 5-10 year written screening schedule and personal prevention plan. Recommendations were developed using the USPSTF age appropriate recommendations. Education, counseling, and referrals were provided as needed. After Visit Summary printed and given to patient which includes a list of additional screenings\tests needed.    Follow up in about 6 weeks (around 5/15/2023) with provider.    CARLOS Su  I offered to discuss advanced care planning, including how to pick a person who would make decisions for you if you were unable to make them for yourself, called a health care power of , and what kind of decisions you might make such as use of life sustaining treatments such as ventilators and tube feeding when faced with a life limiting illness recorded on a living will that they will need to know. (How you want to be cared for as you near the end of your natural life)     X  Patient has advanced directives on file, which we reviewed, and they do not wish to make changes.

## 2023-04-03 NOTE — PATIENT INSTRUCTIONS
Counseling and Referral of Other Preventative  (Italic type indicates deductible and co-insurance are waived)    Patient Name: Regino Lawrence  Today's Date: 4/3/2023    Health Maintenance       Date Due Completion Date    Shingles Vaccine (1 of 2) 07/20/2015 5/25/2015    Mammogram 09/14/2022 9/14/2020    Diabetes Urine Screening 04/04/2023 4/4/2022    Hemoglobin A1c 06/09/2023 12/9/2022    Eye Exam 06/27/2023 6/27/2022    Lipid Panel 12/09/2023 12/9/2022    DEXA Scan 11/07/2024 11/7/2022    TETANUS VACCINE 05/16/2026 5/16/2016        No orders of the defined types were placed in this encounter.    The following information is provided to all patients.  This information is to help you find resources for any of the problems found today that may be affecting your health:                Living healthy guide: www.[facility].[place].[facility]      Understanding Diabetes: www.diabetes.org      Eating healthy: www.cdc.gov/healthyweight      CDC home safety checklist: www.cdc.gov/steadi/patient.html      Agency on Aging: www.goea.[place].[facility]      Alcoholics anonymous (AA): www.aa.org      Physical Activity: www.himanshu.nih.gov/hw3kbsv      Tobacco use: www.quitwithusla.org

## 2023-04-10 ENCOUNTER — INFUSION (OUTPATIENT)
Dept: INFUSION THERAPY | Facility: HOSPITAL | Age: 78
End: 2023-04-10
Attending: INTERNAL MEDICINE
Payer: MEDICARE

## 2023-04-10 VITALS
HEART RATE: 96 BPM | SYSTOLIC BLOOD PRESSURE: 152 MMHG | TEMPERATURE: 98 F | DIASTOLIC BLOOD PRESSURE: 69 MMHG | OXYGEN SATURATION: 95 % | RESPIRATION RATE: 18 BRPM

## 2023-04-10 DIAGNOSIS — D63.1 ANEMIA IN STAGE 3 CHRONIC KIDNEY DISEASE: ICD-10-CM

## 2023-04-10 DIAGNOSIS — D50.9 IRON DEFICIENCY ANEMIA, UNSPECIFIED IRON DEFICIENCY ANEMIA TYPE: ICD-10-CM

## 2023-04-10 DIAGNOSIS — N18.30 ANEMIA IN STAGE 3 CHRONIC KIDNEY DISEASE: ICD-10-CM

## 2023-04-10 DIAGNOSIS — N18.30 ANEMIA IN STAGE 3 CHRONIC KIDNEY DISEASE, UNSPECIFIED WHETHER STAGE 3A OR 3B CKD: Primary | ICD-10-CM

## 2023-04-10 DIAGNOSIS — D63.1 ANEMIA IN CHRONIC KIDNEY DISEASE, UNSPECIFIED CKD STAGE: ICD-10-CM

## 2023-04-10 DIAGNOSIS — D63.1 ANEMIA IN STAGE 3 CHRONIC KIDNEY DISEASE, UNSPECIFIED WHETHER STAGE 3A OR 3B CKD: Primary | ICD-10-CM

## 2023-04-10 DIAGNOSIS — N18.9 ANEMIA IN CHRONIC KIDNEY DISEASE, UNSPECIFIED CKD STAGE: ICD-10-CM

## 2023-04-10 DIAGNOSIS — Z53.1 REFUSAL OF BLOOD TRANSFUSIONS AS PATIENT IS JEHOVAH'S WITNESS: ICD-10-CM

## 2023-04-10 PROCEDURE — 63600175 PHARM REV CODE 636 W HCPCS: Mod: JZ,JG,EC | Performed by: INTERNAL MEDICINE

## 2023-04-10 PROCEDURE — 96372 THER/PROPH/DIAG INJ SC/IM: CPT

## 2023-04-10 RX ADMIN — EPOETIN ALFA-EPBX 40000 UNITS: 40000 INJECTION, SOLUTION INTRAVENOUS; SUBCUTANEOUS at 11:04

## 2023-04-10 NOTE — PLAN OF CARE
Anemia Symptoms Improvement     Assist with determining underlying cause.  Promote rest; assist patient with energy-conserving measures to manage fatigue (e.g., cluster care, balance activity with periods of rest).  Implement strategies to prevent falls related to fatigue, weakness and dizziness, such as sitting before standing, seeking assistance with activity and decluttering area.  Promote optimal oral intake to support fluid balance and nutrition; monitor intake and output.  Assess for, and correct, nutritional deficiencies including iron, vitamin B12 or folic acid.

## 2023-04-21 ENCOUNTER — INFUSION (OUTPATIENT)
Dept: INFUSION THERAPY | Facility: HOSPITAL | Age: 78
End: 2023-04-21
Attending: INTERNAL MEDICINE
Payer: MEDICARE

## 2023-04-21 VITALS
TEMPERATURE: 98 F | OXYGEN SATURATION: 95 % | SYSTOLIC BLOOD PRESSURE: 163 MMHG | RESPIRATION RATE: 18 BRPM | DIASTOLIC BLOOD PRESSURE: 77 MMHG | HEART RATE: 91 BPM

## 2023-04-21 DIAGNOSIS — D63.1 ANEMIA IN STAGE 3 CHRONIC KIDNEY DISEASE, UNSPECIFIED WHETHER STAGE 3A OR 3B CKD: Primary | ICD-10-CM

## 2023-04-21 DIAGNOSIS — D63.1 ANEMIA IN STAGE 3 CHRONIC KIDNEY DISEASE: ICD-10-CM

## 2023-04-21 DIAGNOSIS — N18.30 ANEMIA IN STAGE 3 CHRONIC KIDNEY DISEASE, UNSPECIFIED WHETHER STAGE 3A OR 3B CKD: Primary | ICD-10-CM

## 2023-04-21 DIAGNOSIS — N18.9 ANEMIA IN CHRONIC KIDNEY DISEASE, UNSPECIFIED CKD STAGE: ICD-10-CM

## 2023-04-21 DIAGNOSIS — N18.30 ANEMIA IN STAGE 3 CHRONIC KIDNEY DISEASE: ICD-10-CM

## 2023-04-21 DIAGNOSIS — Z53.1 REFUSAL OF BLOOD TRANSFUSIONS AS PATIENT IS JEHOVAH'S WITNESS: ICD-10-CM

## 2023-04-21 DIAGNOSIS — D50.9 IRON DEFICIENCY ANEMIA, UNSPECIFIED IRON DEFICIENCY ANEMIA TYPE: ICD-10-CM

## 2023-04-21 DIAGNOSIS — D63.1 ANEMIA IN CHRONIC KIDNEY DISEASE, UNSPECIFIED CKD STAGE: ICD-10-CM

## 2023-04-21 PROCEDURE — 63600175 PHARM REV CODE 636 W HCPCS: Mod: JZ,JG,EC | Performed by: INTERNAL MEDICINE

## 2023-04-21 PROCEDURE — 96372 THER/PROPH/DIAG INJ SC/IM: CPT

## 2023-04-21 RX ADMIN — EPOETIN ALFA-EPBX 40000 UNITS: 40000 INJECTION, SOLUTION INTRAVENOUS; SUBCUTANEOUS at 11:04

## 2023-05-04 ENCOUNTER — PATIENT MESSAGE (OUTPATIENT)
Dept: FAMILY MEDICINE | Facility: CLINIC | Age: 78
End: 2023-05-04
Payer: MEDICARE

## 2023-05-04 DIAGNOSIS — M79.10 MYALGIA: ICD-10-CM

## 2023-05-04 DIAGNOSIS — R14.0 BLOATING: ICD-10-CM

## 2023-05-04 DIAGNOSIS — I10 HYPERTENSION, UNCONTROLLED: ICD-10-CM

## 2023-05-04 DIAGNOSIS — G72.9 MYOPATHY: ICD-10-CM

## 2023-05-04 RX ORDER — TIZANIDINE 2 MG/1
4 TABLET ORAL EVERY 8 HOURS PRN
Qty: 270 TABLET | Refills: 1 | Status: SHIPPED | OUTPATIENT
Start: 2023-05-04 | End: 2023-10-21 | Stop reason: SDUPTHER

## 2023-05-04 NOTE — TELEPHONE ENCOUNTER
No care due was identified.  [facility] Embedded Care Due Messages. Reference number: [id number].   5/04/2023 10:01:23 AM CDT

## 2023-05-04 NOTE — TELEPHONE ENCOUNTER
No care due was identified.  [facility] Embedded Care Due Messages. Reference number: [id number].   5/04/2023 4:03:43 PM CDT

## 2023-05-05 ENCOUNTER — INFUSION (OUTPATIENT)
Dept: INFUSION THERAPY | Facility: HOSPITAL | Age: 78
End: 2023-05-05
Attending: INTERNAL MEDICINE
Payer: MEDICARE

## 2023-05-05 VITALS
DIASTOLIC BLOOD PRESSURE: 79 MMHG | RESPIRATION RATE: 18 BRPM | TEMPERATURE: 99 F | HEART RATE: 91 BPM | OXYGEN SATURATION: 96 % | SYSTOLIC BLOOD PRESSURE: 169 MMHG

## 2023-05-05 DIAGNOSIS — N18.9 ANEMIA IN CHRONIC KIDNEY DISEASE, UNSPECIFIED CKD STAGE: ICD-10-CM

## 2023-05-05 DIAGNOSIS — D63.1 ANEMIA IN STAGE 3 CHRONIC KIDNEY DISEASE: ICD-10-CM

## 2023-05-05 DIAGNOSIS — D63.1 ANEMIA IN CHRONIC KIDNEY DISEASE, UNSPECIFIED CKD STAGE: ICD-10-CM

## 2023-05-05 DIAGNOSIS — N18.30 ANEMIA IN STAGE 3 CHRONIC KIDNEY DISEASE, UNSPECIFIED WHETHER STAGE 3A OR 3B CKD: Primary | ICD-10-CM

## 2023-05-05 DIAGNOSIS — Z53.1 REFUSAL OF BLOOD TRANSFUSIONS AS PATIENT IS JEHOVAH'S WITNESS: ICD-10-CM

## 2023-05-05 DIAGNOSIS — D50.9 IRON DEFICIENCY ANEMIA, UNSPECIFIED IRON DEFICIENCY ANEMIA TYPE: ICD-10-CM

## 2023-05-05 DIAGNOSIS — D63.1 ANEMIA IN STAGE 3 CHRONIC KIDNEY DISEASE, UNSPECIFIED WHETHER STAGE 3A OR 3B CKD: Primary | ICD-10-CM

## 2023-05-05 DIAGNOSIS — N18.30 ANEMIA IN STAGE 3 CHRONIC KIDNEY DISEASE: ICD-10-CM

## 2023-05-05 PROCEDURE — 63600175 PHARM REV CODE 636 W HCPCS: Mod: JZ,JG | Performed by: INTERNAL MEDICINE

## 2023-05-05 PROCEDURE — 96372 THER/PROPH/DIAG INJ SC/IM: CPT

## 2023-05-05 RX ORDER — NIFEDIPINE 90 MG/1
90 TABLET, EXTENDED RELEASE ORAL DAILY
Qty: 90 TABLET | Refills: 1 | OUTPATIENT
Start: 2023-05-05 | End: 2024-05-04

## 2023-05-05 RX ORDER — NIFEDIPINE 90 MG/1
90 TABLET, EXTENDED RELEASE ORAL DAILY
Qty: 90 TABLET | Refills: 1 | Status: SHIPPED | OUTPATIENT
Start: 2023-05-05 | End: 2023-05-15

## 2023-05-05 RX ORDER — PANTOPRAZOLE SODIUM 40 MG/1
40 TABLET, DELAYED RELEASE ORAL DAILY
Qty: 90 TABLET | Refills: 1 | Status: SHIPPED | OUTPATIENT
Start: 2023-05-05 | End: 2023-12-11 | Stop reason: SDUPTHER

## 2023-05-05 RX ADMIN — EPOETIN ALFA-EPBX 40000 UNITS: 40000 INJECTION, SOLUTION INTRAVENOUS; SUBCUTANEOUS at 11:05

## 2023-05-05 NOTE — TELEPHONE ENCOUNTER
----- Message from Cory Sun MA sent at 5/5/2023  1:24 PM CDT -----  Type: Patient Call Back    Who called: Self    What is the request in detail: pt. States she along with the pharmacy has been trying to reach Dr. Mendoza regarding a refill for her blood pressure medication.. she says her pressure has been running in the 180's and needs her medicine called in..     Can the clinic reply by [facility]?No    Would the patient rather a call back or a response via [facility]? yes    Best call back number: [phone]

## 2023-05-05 NOTE — PLAN OF CARE
Pt received q2w RETAcrit, 73024b. Labs done prior to. Hgb is 9.9 today. Pt tolerated injection well. Has next appointments. Discharged from unit using motor scooter.

## 2023-05-15 ENCOUNTER — OFFICE VISIT (OUTPATIENT)
Dept: FAMILY MEDICINE | Facility: CLINIC | Age: 78
End: 2023-05-15
Payer: MEDICARE

## 2023-05-15 VITALS
OXYGEN SATURATION: 96 % | WEIGHT: 127.63 LBS | TEMPERATURE: 98 F | HEIGHT: 62 IN | HEART RATE: 97 BPM | SYSTOLIC BLOOD PRESSURE: 136 MMHG | DIASTOLIC BLOOD PRESSURE: 88 MMHG | RESPIRATION RATE: 16 BRPM | BODY MASS INDEX: 23.49 KG/M2

## 2023-05-15 DIAGNOSIS — K59.09 OTHER CONSTIPATION: ICD-10-CM

## 2023-05-15 DIAGNOSIS — I10 ESSENTIAL HYPERTENSION: Primary | Chronic | ICD-10-CM

## 2023-05-15 DIAGNOSIS — R82.998 DARK URINE: ICD-10-CM

## 2023-05-15 DIAGNOSIS — M46.1 SACROILIITIS: ICD-10-CM

## 2023-05-15 DIAGNOSIS — D63.1 ANEMIA IN STAGE 3 CHRONIC KIDNEY DISEASE, UNSPECIFIED WHETHER STAGE 3A OR 3B CKD: ICD-10-CM

## 2023-05-15 DIAGNOSIS — N18.30 ANEMIA IN STAGE 3 CHRONIC KIDNEY DISEASE, UNSPECIFIED WHETHER STAGE 3A OR 3B CKD: ICD-10-CM

## 2023-05-15 PROCEDURE — 99214 OFFICE O/P EST MOD 30 MIN: CPT | Mod: S$GLB,,, | Performed by: FAMILY MEDICINE

## 2023-05-15 PROCEDURE — 1157F ADVNC CARE PLAN IN RCRD: CPT | Mod: CPTII,S$GLB,, | Performed by: FAMILY MEDICINE

## 2023-05-15 PROCEDURE — 3079F PR MOST RECENT DIASTOLIC BLOOD PRESSURE 80-89 MM HG: ICD-10-PCS | Mod: CPTII,S$GLB,, | Performed by: FAMILY MEDICINE

## 2023-05-15 PROCEDURE — 1159F PR MEDICATION LIST DOCUMENTED IN MEDICAL RECORD: ICD-10-PCS | Mod: CPTII,S$GLB,, | Performed by: FAMILY MEDICINE

## 2023-05-15 PROCEDURE — 3075F SYST BP GE 130 - 139MM HG: CPT | Mod: CPTII,S$GLB,, | Performed by: FAMILY MEDICINE

## 2023-05-15 PROCEDURE — 99214 PR OFFICE/OUTPT VISIT, EST, LEVL IV, 30-39 MIN: ICD-10-PCS | Mod: S$GLB,,, | Performed by: FAMILY MEDICINE

## 2023-05-15 PROCEDURE — 3075F PR MOST RECENT SYSTOLIC BLOOD PRESS GE 130-139MM HG: ICD-10-PCS | Mod: CPTII,S$GLB,, | Performed by: FAMILY MEDICINE

## 2023-05-15 PROCEDURE — 99999 PR PBB SHADOW E&M-EST. PATIENT-LVL V: CPT | Mod: PBBFAC,,, | Performed by: FAMILY MEDICINE

## 2023-05-15 PROCEDURE — 3079F DIAST BP 80-89 MM HG: CPT | Mod: CPTII,S$GLB,, | Performed by: FAMILY MEDICINE

## 2023-05-15 PROCEDURE — 1159F MED LIST DOCD IN RCRD: CPT | Mod: CPTII,S$GLB,, | Performed by: FAMILY MEDICINE

## 2023-05-15 PROCEDURE — 1157F PR ADVANCE CARE PLAN OR EQUIV PRESENT IN MEDICAL RECORD: ICD-10-PCS | Mod: CPTII,S$GLB,, | Performed by: FAMILY MEDICINE

## 2023-05-15 PROCEDURE — 1125F AMNT PAIN NOTED PAIN PRSNT: CPT | Mod: CPTII,S$GLB,, | Performed by: FAMILY MEDICINE

## 2023-05-15 PROCEDURE — 3072F PR LOW RISK FOR RETINOPATHY: ICD-10-PCS | Mod: CPTII,S$GLB,, | Performed by: FAMILY MEDICINE

## 2023-05-15 PROCEDURE — 1125F PR PAIN SEVERITY QUANTIFIED, PAIN PRESENT: ICD-10-PCS | Mod: CPTII,S$GLB,, | Performed by: FAMILY MEDICINE

## 2023-05-15 PROCEDURE — 99999 PR PBB SHADOW E&M-EST. PATIENT-LVL V: ICD-10-PCS | Mod: PBBFAC,,, | Performed by: FAMILY MEDICINE

## 2023-05-15 PROCEDURE — 3072F LOW RISK FOR RETINOPATHY: CPT | Mod: CPTII,S$GLB,, | Performed by: FAMILY MEDICINE

## 2023-05-15 RX ORDER — DICLOFENAC SODIUM 10 MG/G
2 GEL TOPICAL DAILY
Qty: 100 G | Refills: 5 | Status: SHIPPED | OUTPATIENT
Start: 2023-05-15

## 2023-05-15 NOTE — PROGRESS NOTES
Chief Complaint   Patient presents with    Constipation    Urinary Retention       HPI  Regino Lawrence is a 77 y.o. female with multiple medical diagnoses as listed in the medical history and problem list that presents for evaluation for constipation and urinary incontinence    Constipation- symptoms worsened two months ago, she has been having to take a laxative at least twice a week as she may not have a bowel movement for the entire week, she has had trouble urinating when she is constipated and having abdominal bloating  Chronic pain- she has sarcoidosis and arthritis, she is having spasms and pain in her her hands  Low blood pressure- she has been having decreased blood pressures and is holding her hydralazine if BP is WNL, if it is a systolic of 120 she will take one pill and not two      ALLERGIES AND MEDICATIONS: updated and reviewed.  Review of patient's allergies indicates:   Allergen Reactions    Azathioprine Shortness Of Breath and Other (See Comments)     Fatigue     Medication List with Changes/Refills   New Medications    DICLOFENAC SODIUM (VOLTAREN) 1 % GEL    Apply 2 g topically once daily.    LINACLOTIDE (LINZESS) 72 MCG CAP CAPSULE    Take 1 capsule (72 mcg total) by mouth before breakfast.   Current Medications    ACCU-CHEK FASTCLIX LANCING DEV KIT    USE AS DIRECTED.    ALBUTEROL-IPRATROPIUM (DUO-NEB) 2.5 MG-0.5 MG/3 ML NEBULIZER SOLUTION    Take 3 mLs by nebulization every 4 (four) hours as needed for Wheezing or Shortness of Breath. Rescue    ALCOHOL ANTISEPTIC PADS (ALCOHOL PREP PADS TOP)        APIXABAN (ELIQUIS) 5 MG TAB    Take 1 tablet (5 mg total) by mouth 2 (two) times daily. Start this prescription after finishing starter pack    ATORVASTATIN (LIPITOR) 20 MG TABLET    Take 1 tablet (20 mg total) by mouth once daily.    BLOOD SUGAR DIAGNOSTIC (ACCU-CHEK SMARTVIEW TEST STRIP) STRP    Use as directed to check blood sugar twice daily.    BLOOD SUGAR DIAGNOSTIC STRP    To check BG three  times daily, to use with insurance preferred meter    BLOOD-GLUCOSE METER KIT    Use as instructed    BLOOD-GLUCOSE METER KIT    To check BG three times daily, to use with insurance preferred meter    BRIVARACETAM (BRIVIACT) 75 MG TAB    Take 1 tablet (75 mg total) by mouth 2 (two) times daily.    CALCIUM CARBONATE (OS-MALCOLM) 600 MG CALCIUM (1,500 MG) TAB    Take 1 tablet by mouth 2 (two) times daily.     CARVEDILOL (COREG) 25 MG TABLET    Take 1 tablet (25 mg total) by mouth 2 (two) times daily.    CETIRIZINE (ZYRTEC) 10 MG TABLET    Take 1 tablet (10 mg total) by mouth once daily.    DULOXETINE (CYMBALTA) 20 MG CAPSULE    Take 1 capsule (20 mg total) by mouth once daily.    EPOETIN WOLFGANG-EPBX (RETACRIT) 10,000 UNIT/ML IMJECTION    Inject 20,000 Units into the skin every 14 (fourteen) days.    FENOFIBRATE 160 MG TAB    Take 1 tablet (160 mg total) by mouth once daily.    FLUTICASONE PROPION-SALMETEROL 115-21 MCG/DOSE (ADVAIR HFA) 115-21 MCG/ACTUATION HFAA INHALER    Inhale 2 puffs into the lungs every 12 (twelve) hours. Controller    FOLIC ACID (FOLVITE) 1 MG TABLET    Take 1 tablet (1,000 mcg total) by mouth once daily.    HYDRALAZINE (APRESOLINE) 25 MG TABLET    Take 2 tabs every 8 hours by mouth. Check BP 2 hours after each dose and if Blood pressure >160- please take 1 extra tab    INSULIN DETEMIR U-100 (LEVEMIR FLEXTOUCH) 100 UNIT/ML (3 ML) SUBQ INPN PEN    Inject 10 Units into the skin every evening.    KLGA KETOPROFEN 10% LIDOCAINE 10% GABAPENTIN 6% AMITRIPTYLINE 2% IN TRANSDERMAL CREAM    Apply 1 to 2 grams 3 to 4 times daily    LEVETIRACETAM (KEPPRA) 250 MG TAB    Take 1 tablet (250 mg total) by mouth 2 (two) times daily.    LEVOTHYROXINE (SYNTHROID) 50 MCG TABLET    Take 1 tablet (50 mcg total) by mouth before breakfast.    NIFEDIPINE (PROCARDIA-XL) 90 MG (OSM) 24 HR TABLET    Take 1 tablet (90 mg total) by mouth once daily.    OLOPATADINE (PATANOL) 0.1 % OPHTHALMIC SOLUTION    Place 1 drop into both eyes  "daily as needed for Allergies.    PANTOPRAZOLE (PROTONIX) 40 MG TABLET    Take 1 tablet (40 mg total) by mouth once daily.    PEN NEEDLE, DIABETIC (NOVOFINE 32) 32 GAUGE X 1/4" NDLE    For use with [employer] pen .    PREDNISONE (DELTASONE) 5 MG TABLET    Take 1 tablet (5 mg total) by mouth once daily.    TIZANIDINE (ZANAFLEX) 2 MG TABLET    Take 2 tablets (4 mg total) by mouth every 8 (eight) hours as needed (muscle spasm).   Discontinued Medications    NIFEDIPINE (PROCARDIA-XL) 90 MG (OSM) 24 HR TABLET    Take 1 tablet (90 mg total) by mouth once daily.    POTASSIUM CHLORIDE SA (K-DUR,KLOR-CON) 20 MEQ TABLET    Take 1 tablet (20 mEq total) by mouth 2 (two) times daily.       ROS  Review of Systems   Constitutional:  Negative for chills, diaphoresis, fatigue, fever and unexpected weight change.   HENT:  Negative for rhinorrhea, sinus pressure, sore throat and tinnitus.    Eyes:  Negative for photophobia and visual disturbance.   Respiratory:  Negative for cough, shortness of breath and wheezing.    Cardiovascular:  Negative for chest pain and palpitations.   Gastrointestinal:  Positive for constipation. Negative for abdominal pain, blood in stool, diarrhea, nausea and vomiting.   Genitourinary:  Positive for difficulty urinating. Negative for dysuria, flank pain, frequency and vaginal discharge.   Musculoskeletal:  Positive for arthralgias. Negative for joint swelling.   Skin:  Negative for rash.   Neurological:  Negative for speech difficulty, weakness, light-headedness and headaches.   Psychiatric/Behavioral:  Negative for behavioral problems and dysphoric mood.      Physical Exam  Vitals:    05/15/23 1029   BP: 136/88   BP Location: Left arm   Patient Position: Sitting   BP Method: Medium (Manual)   Pulse: 97   Resp: 16   Temp: 98 °F (36.7 °C)   TempSrc: Oral   SpO2: 96%   Weight: 57.9 kg (127 lb 10.3 oz)   Height: 5' 2" (1.575 m)    Body mass index is 23.35 kg/m².  Weight: 57.9 kg (127 lb 10.3 oz)   Height: 5' 2" " (157.5 cm)     Physical Exam  Vitals reviewed.   Constitutional:       General: She is not in acute distress.     Appearance: She is well-developed.   HENT:      Head: Normocephalic and atraumatic.   Cardiovascular:      Rate and Rhythm: Normal rate and regular rhythm.      Heart sounds: No murmur heard.    No friction rub. No gallop.   Pulmonary:      Effort: Pulmonary effort is normal. No respiratory distress.      Breath sounds: Normal breath sounds. No wheezing or rales.   Abdominal:      General: There is distension.      Tenderness: There is no abdominal tenderness.      Comments: Hyperactive bowel sounds   Skin:     General: Skin is warm and dry.      Findings: No rash.   Neurological:      Mental Status: She is alert. Mental status is at baseline.   Psychiatric:         Behavior: Behavior normal.         Thought Content: Thought content normal.       Health Maintenance         Date Due Completion Date    Shingles Vaccine (1 of 2) 07/20/2015 5/25/2015    Mammogram 09/14/2022 9/14/2020    Diabetes Urine Screening 04/04/2023 4/4/2022    Hemoglobin A1c 06/09/2023 12/9/2022    Eye Exam 06/27/2023 6/27/2022    Lipid Panel 12/09/2023 12/9/2022    DEXA Scan 11/07/2024 11/7/2022    TETANUS VACCINE 05/16/2026 5/16/2016            Health maintenance reviewed and addressed as ordered      ASSESSMENT/PLAN       1. Essential hypertension  Continue current regimen, only taking  one hydralazine if BP is in normal range and then only taking two pills if high    2. Anemia in stage 3 chronic kidney disease, unspecified whether stage 3a or 3b CKD  stable    3. Dark urine  Eval for UTI  - Urine culture; Future    4. Other constipation  Imaging to eval for obstruction, she is not having n/v  Begin linzess  - X-Ray Abdomen AP 1 View; Future  - linaCLOtide (LINZESS) 72 mcg Cap capsule; Take 1 capsule (72 mcg total) by mouth before breakfast.  Dispense: 30 capsule; Refill: 3    5. Sacroiliitis  Trial of topical pain cream  -  diclofenac sodium (VOLTAREN) 1 % Gel; Apply 2 g topically once daily.  Dispense: 100 g; Refill: 5        Micaela Mendoza MD  05/15/2023 10:39 AM        Follow up in about 3 months (around 8/15/2023).    Orders Placed This Encounter   Procedures    Urine culture    X-Ray Abdomen AP 1 View

## 2023-05-15 NOTE — PATIENT INSTRUCTIONS
I am sending you voltaren gel to rub on your joints, it is over the counter just in case does not cover it

## 2023-05-16 ENCOUNTER — APPOINTMENT (OUTPATIENT)
Dept: RADIOLOGY | Facility: HOSPITAL | Age: 78
End: 2023-05-16
Attending: FAMILY MEDICINE
Payer: MEDICARE

## 2023-05-16 DIAGNOSIS — K59.09 OTHER CONSTIPATION: ICD-10-CM

## 2023-05-16 PROCEDURE — 74018 XR ABDOMEN AP 1 VIEW: ICD-10-PCS | Mod: 26,,, | Performed by: RADIOLOGY

## 2023-05-16 PROCEDURE — 74018 RADEX ABDOMEN 1 VIEW: CPT | Mod: 26,,, | Performed by: RADIOLOGY

## 2023-05-16 PROCEDURE — 74018 RADEX ABDOMEN 1 VIEW: CPT | Mod: TC,FY,PN

## 2023-05-19 ENCOUNTER — INFUSION (OUTPATIENT)
Dept: INFUSION THERAPY | Facility: HOSPITAL | Age: 78
End: 2023-05-19
Attending: INTERNAL MEDICINE
Payer: MEDICARE

## 2023-05-19 VITALS
RESPIRATION RATE: 18 BRPM | TEMPERATURE: 98 F | OXYGEN SATURATION: 95 % | HEART RATE: 89 BPM | DIASTOLIC BLOOD PRESSURE: 78 MMHG | SYSTOLIC BLOOD PRESSURE: 164 MMHG

## 2023-05-19 DIAGNOSIS — D63.1 ANEMIA IN STAGE 3 CHRONIC KIDNEY DISEASE, UNSPECIFIED WHETHER STAGE 3A OR 3B CKD: Primary | ICD-10-CM

## 2023-05-19 DIAGNOSIS — N18.30 ANEMIA IN STAGE 3 CHRONIC KIDNEY DISEASE: ICD-10-CM

## 2023-05-19 DIAGNOSIS — D63.1 ANEMIA IN CHRONIC KIDNEY DISEASE, UNSPECIFIED CKD STAGE: ICD-10-CM

## 2023-05-19 DIAGNOSIS — D50.9 IRON DEFICIENCY ANEMIA, UNSPECIFIED IRON DEFICIENCY ANEMIA TYPE: ICD-10-CM

## 2023-05-19 DIAGNOSIS — N18.9 ANEMIA IN CHRONIC KIDNEY DISEASE, UNSPECIFIED CKD STAGE: ICD-10-CM

## 2023-05-19 DIAGNOSIS — N18.30 ANEMIA IN STAGE 3 CHRONIC KIDNEY DISEASE, UNSPECIFIED WHETHER STAGE 3A OR 3B CKD: Primary | ICD-10-CM

## 2023-05-19 DIAGNOSIS — Z53.1 REFUSAL OF BLOOD TRANSFUSIONS AS PATIENT IS JEHOVAH'S WITNESS: ICD-10-CM

## 2023-05-19 DIAGNOSIS — D63.1 ANEMIA IN STAGE 3 CHRONIC KIDNEY DISEASE: ICD-10-CM

## 2023-05-19 PROCEDURE — 63600175 PHARM REV CODE 636 W HCPCS: Mod: JZ,JG | Performed by: INTERNAL MEDICINE

## 2023-05-19 PROCEDURE — 96372 THER/PROPH/DIAG INJ SC/IM: CPT

## 2023-05-19 RX ADMIN — EPOETIN ALFA-EPBX 40000 UNITS: 40000 INJECTION, SOLUTION INTRAVENOUS; SUBCUTANEOUS at 10:05

## 2023-05-19 NOTE — PLAN OF CARE
Pt received q2w RETAcrit, 85609m. Labs done prior to. Hgb is 10.2 today. Pt tolerated injection well. Has next appointments. Discharged from unit using motor scooter.

## 2023-06-02 ENCOUNTER — INFUSION (OUTPATIENT)
Dept: INFUSION THERAPY | Facility: HOSPITAL | Age: 78
End: 2023-06-02
Attending: INTERNAL MEDICINE
Payer: MEDICARE

## 2023-06-02 ENCOUNTER — LAB VISIT (OUTPATIENT)
Dept: LAB | Facility: HOSPITAL | Age: 78
End: 2023-06-02
Attending: INTERNAL MEDICINE
Payer: MEDICARE

## 2023-06-02 VITALS
OXYGEN SATURATION: 96 % | DIASTOLIC BLOOD PRESSURE: 68 MMHG | HEART RATE: 88 BPM | TEMPERATURE: 99 F | RESPIRATION RATE: 18 BRPM | SYSTOLIC BLOOD PRESSURE: 142 MMHG

## 2023-06-02 DIAGNOSIS — Z53.1 REFUSAL OF BLOOD TRANSFUSIONS AS PATIENT IS JEHOVAH'S WITNESS: ICD-10-CM

## 2023-06-02 DIAGNOSIS — D63.1 ANEMIA IN STAGE 3 CHRONIC KIDNEY DISEASE: ICD-10-CM

## 2023-06-02 DIAGNOSIS — N18.30 ANEMIA IN STAGE 3 CHRONIC KIDNEY DISEASE: ICD-10-CM

## 2023-06-02 DIAGNOSIS — D63.1 ANEMIA IN STAGE 3 CHRONIC KIDNEY DISEASE, UNSPECIFIED WHETHER STAGE 3A OR 3B CKD: Primary | ICD-10-CM

## 2023-06-02 DIAGNOSIS — D50.9 IRON DEFICIENCY ANEMIA, UNSPECIFIED IRON DEFICIENCY ANEMIA TYPE: ICD-10-CM

## 2023-06-02 DIAGNOSIS — N18.9 ANEMIA IN CHRONIC KIDNEY DISEASE, UNSPECIFIED CKD STAGE: ICD-10-CM

## 2023-06-02 DIAGNOSIS — N18.30 ANEMIA IN STAGE 3 CHRONIC KIDNEY DISEASE, UNSPECIFIED WHETHER STAGE 3A OR 3B CKD: Primary | ICD-10-CM

## 2023-06-02 DIAGNOSIS — N18.9 CKD (CHRONIC KIDNEY DISEASE): ICD-10-CM

## 2023-06-02 DIAGNOSIS — D63.1 ANEMIA IN CHRONIC KIDNEY DISEASE, UNSPECIFIED CKD STAGE: ICD-10-CM

## 2023-06-02 LAB
BASOPHILS # BLD AUTO: 0.02 K/UL (ref 0–0.2)
BASOPHILS NFR BLD: 0.5 % (ref 0–1.9)
DIFFERENTIAL METHOD: ABNORMAL
EOSINOPHIL # BLD AUTO: 0 K/UL (ref 0–0.5)
EOSINOPHIL NFR BLD: 1 % (ref 0–8)
ERYTHROCYTE [DISTWIDTH] IN BLOOD BY AUTOMATED COUNT: 13.7 % (ref 11.5–14.5)
HCT VFR BLD AUTO: 32.7 % (ref 37–48.5)
HGB BLD-MCNC: 10.8 G/DL (ref 12–16)
IMM GRANULOCYTES # BLD AUTO: 0.03 K/UL (ref 0–0.04)
IMM GRANULOCYTES NFR BLD AUTO: 0.7 % (ref 0–0.5)
LYMPHOCYTES # BLD AUTO: 1.1 K/UL (ref 1–4.8)
LYMPHOCYTES NFR BLD: 26.9 % (ref 18–48)
MCH RBC QN AUTO: 33.5 PG (ref 27–31)
MCHC RBC AUTO-ENTMCNC: 33 G/DL (ref 32–36)
MCV RBC AUTO: 102 FL (ref 82–98)
MONOCYTES # BLD AUTO: 0.5 K/UL (ref 0.3–1)
MONOCYTES NFR BLD: 10.8 % (ref 4–15)
NEUTROPHILS # BLD AUTO: 2.5 K/UL (ref 1.8–7.7)
NEUTROPHILS NFR BLD: 60.1 % (ref 38–73)
NRBC BLD-RTO: 0 /100 WBC
PLATELET # BLD AUTO: 296 K/UL (ref 150–450)
PMV BLD AUTO: 8.3 FL (ref 9.2–12.9)
RBC # BLD AUTO: 3.22 M/UL (ref 4–5.4)
WBC # BLD AUTO: 4.16 K/UL (ref 3.9–12.7)

## 2023-06-02 PROCEDURE — 63600175 PHARM REV CODE 636 W HCPCS: Mod: JZ,JG,EC | Performed by: INTERNAL MEDICINE

## 2023-06-02 PROCEDURE — 96372 THER/PROPH/DIAG INJ SC/IM: CPT

## 2023-06-02 PROCEDURE — 36415 COLL VENOUS BLD VENIPUNCTURE: CPT | Performed by: INTERNAL MEDICINE

## 2023-06-02 PROCEDURE — 85025 COMPLETE CBC W/AUTO DIFF WBC: CPT | Performed by: INTERNAL MEDICINE

## 2023-06-02 RX ADMIN — EPOETIN ALFA-EPBX 40000 UNITS: 40000 INJECTION, SOLUTION INTRAVENOUS; SUBCUTANEOUS at 11:06

## 2023-06-02 NOTE — PLAN OF CARE
Pt received q2w RETAcrit, 03431i. Labs done prior to. Hgb is 10.8 today. VSS. Pt tolerated injection well. Has next appointments. Discharged from unit using motor scooter.

## 2023-06-16 ENCOUNTER — LAB VISIT (OUTPATIENT)
Dept: LAB | Facility: HOSPITAL | Age: 78
End: 2023-06-16
Attending: INTERNAL MEDICINE
Payer: MEDICARE

## 2023-06-16 DIAGNOSIS — D86.9 SARCOIDOSIS: ICD-10-CM

## 2023-06-16 DIAGNOSIS — G72.9 MYOPATHY: ICD-10-CM

## 2023-06-16 DIAGNOSIS — R53.83 FATIGUE, UNSPECIFIED TYPE: ICD-10-CM

## 2023-06-16 DIAGNOSIS — D86.86 SARCOID ARTHROPATHY: ICD-10-CM

## 2023-06-16 DIAGNOSIS — Z79.52 IMMUNOSUPPRESSION DUE TO CHRONIC STEROID USE: ICD-10-CM

## 2023-06-16 DIAGNOSIS — N18.9 CKD (CHRONIC KIDNEY DISEASE): ICD-10-CM

## 2023-06-16 DIAGNOSIS — D84.821 IMMUNOSUPPRESSION DUE TO CHRONIC STEROID USE: ICD-10-CM

## 2023-06-16 DIAGNOSIS — N18.9 CHRONIC KIDNEY DISEASE, UNSPECIFIED CKD STAGE: ICD-10-CM

## 2023-06-16 DIAGNOSIS — D50.9 IRON DEFICIENCY ANEMIA, UNSPECIFIED IRON DEFICIENCY ANEMIA TYPE: ICD-10-CM

## 2023-06-16 DIAGNOSIS — T38.0X5A IMMUNOSUPPRESSION DUE TO CHRONIC STEROID USE: ICD-10-CM

## 2023-06-16 LAB
ALBUMIN SERPL BCP-MCNC: 3.7 G/DL (ref 3.5–5.2)
ALP SERPL-CCNC: 45 U/L (ref 55–135)
ALT SERPL W/O P-5'-P-CCNC: 14 U/L (ref 10–44)
ANION GAP SERPL CALC-SCNC: 9 MMOL/L (ref 8–16)
AST SERPL-CCNC: 19 U/L (ref 10–40)
BASOPHILS # BLD AUTO: 0.02 K/UL (ref 0–0.2)
BASOPHILS NFR BLD: 0.4 % (ref 0–1.9)
BILIRUB SERPL-MCNC: 0.5 MG/DL (ref 0.1–1)
BUN SERPL-MCNC: 17 MG/DL (ref 8–23)
CALCIUM SERPL-MCNC: 9.4 MG/DL (ref 8.7–10.5)
CHLORIDE SERPL-SCNC: 96 MMOL/L (ref 95–110)
CK SERPL-CCNC: 249 U/L (ref 20–180)
CO2 SERPL-SCNC: 25 MMOL/L (ref 23–29)
CREAT SERPL-MCNC: 1.1 MG/DL (ref 0.5–1.4)
CRP SERPL-MCNC: 7.3 MG/L (ref 0–8.2)
DIFFERENTIAL METHOD: ABNORMAL
EOSINOPHIL # BLD AUTO: 0.1 K/UL (ref 0–0.5)
EOSINOPHIL NFR BLD: 1.1 % (ref 0–8)
ERYTHROCYTE [DISTWIDTH] IN BLOOD BY AUTOMATED COUNT: 13.5 % (ref 11.5–14.5)
ERYTHROCYTE [SEDIMENTATION RATE] IN BLOOD BY WESTERGREN METHOD: 15 MM/HR (ref 0–20)
EST. GFR  (NO RACE VARIABLE): 52 ML/MIN/1.73 M^2
FERRITIN SERPL-MCNC: 162 NG/ML (ref 20–300)
GLUCOSE SERPL-MCNC: 105 MG/DL (ref 70–110)
HCT VFR BLD AUTO: 33.7 % (ref 37–48.5)
HGB BLD-MCNC: 11 G/DL (ref 12–16)
IMM GRANULOCYTES # BLD AUTO: 0.02 K/UL (ref 0–0.04)
IMM GRANULOCYTES NFR BLD AUTO: 0.4 % (ref 0–0.5)
IRON SERPL-MCNC: 94 UG/DL (ref 30–160)
LYMPHOCYTES # BLD AUTO: 1.1 K/UL (ref 1–4.8)
LYMPHOCYTES NFR BLD: 24.6 % (ref 18–48)
MCH RBC QN AUTO: 32.4 PG (ref 27–31)
MCHC RBC AUTO-ENTMCNC: 32.6 G/DL (ref 32–36)
MCV RBC AUTO: 99 FL (ref 82–98)
MONOCYTES # BLD AUTO: 0.4 K/UL (ref 0.3–1)
MONOCYTES NFR BLD: 9.8 % (ref 4–15)
NEUTROPHILS # BLD AUTO: 2.9 K/UL (ref 1.8–7.7)
NEUTROPHILS NFR BLD: 63.7 % (ref 38–73)
NRBC BLD-RTO: 0 /100 WBC
PLATELET # BLD AUTO: 263 K/UL (ref 150–450)
PMV BLD AUTO: 8.8 FL (ref 9.2–12.9)
POTASSIUM SERPL-SCNC: 3.5 MMOL/L (ref 3.5–5.1)
PROT SERPL-MCNC: 6.8 G/DL (ref 6–8.4)
RBC # BLD AUTO: 3.39 M/UL (ref 4–5.4)
SATURATED IRON: 24 % (ref 20–50)
SODIUM SERPL-SCNC: 130 MMOL/L (ref 136–145)
TOTAL IRON BINDING CAPACITY: 397 UG/DL (ref 250–450)
TRANSFERRIN SERPL-MCNC: 268 MG/DL (ref 200–375)
WBC # BLD AUTO: 4.48 K/UL (ref 3.9–12.7)

## 2023-06-16 PROCEDURE — 82550 ASSAY OF CK (CPK): CPT | Performed by: INTERNAL MEDICINE

## 2023-06-16 PROCEDURE — 85652 RBC SED RATE AUTOMATED: CPT | Performed by: INTERNAL MEDICINE

## 2023-06-16 PROCEDURE — 82728 ASSAY OF FERRITIN: CPT | Performed by: INTERNAL MEDICINE

## 2023-06-16 PROCEDURE — 82085 ASSAY OF ALDOLASE: CPT | Performed by: INTERNAL MEDICINE

## 2023-06-16 PROCEDURE — 82164 ANGIOTENSIN I ENZYME TEST: CPT | Performed by: INTERNAL MEDICINE

## 2023-06-16 PROCEDURE — 86140 C-REACTIVE PROTEIN: CPT | Performed by: INTERNAL MEDICINE

## 2023-06-16 PROCEDURE — 84466 ASSAY OF TRANSFERRIN: CPT | Performed by: INTERNAL MEDICINE

## 2023-06-16 PROCEDURE — 80053 COMPREHEN METABOLIC PANEL: CPT | Performed by: INTERNAL MEDICINE

## 2023-06-16 PROCEDURE — 85025 COMPLETE CBC W/AUTO DIFF WBC: CPT | Performed by: INTERNAL MEDICINE

## 2023-06-16 PROCEDURE — 36415 COLL VENOUS BLD VENIPUNCTURE: CPT | Performed by: INTERNAL MEDICINE

## 2023-06-17 LAB — ACE SERPL-CCNC: 50 U/L (ref 16–85)

## 2023-06-19 ENCOUNTER — TELEPHONE (OUTPATIENT)
Dept: HEMATOLOGY/ONCOLOGY | Facility: CLINIC | Age: 78
End: 2023-06-19
Payer: MEDICARE

## 2023-06-19 NOTE — TELEPHONE ENCOUNTER
----- Message from Sharon Gamboa RN sent at 6/19/2023 11:51 AM CDT -----  Please reschedule for follo up she was late and the doctors had a meeting

## 2023-06-20 ENCOUNTER — PATIENT MESSAGE (OUTPATIENT)
Dept: RHEUMATOLOGY | Facility: CLINIC | Age: 78
End: 2023-06-20
Payer: MEDICARE

## 2023-06-21 LAB — ALDOLASE SERPL-CCNC: 3.8 U/L (ref 1.2–7.6)

## 2023-06-30 ENCOUNTER — LAB VISIT (OUTPATIENT)
Dept: LAB | Facility: HOSPITAL | Age: 78
End: 2023-06-30
Attending: INTERNAL MEDICINE
Payer: MEDICARE

## 2023-06-30 ENCOUNTER — INFUSION (OUTPATIENT)
Dept: INFUSION THERAPY | Facility: HOSPITAL | Age: 78
End: 2023-06-30
Attending: INTERNAL MEDICINE
Payer: MEDICARE

## 2023-06-30 VITALS
OXYGEN SATURATION: 97 % | HEART RATE: 85 BPM | RESPIRATION RATE: 18 BRPM | TEMPERATURE: 98 F | DIASTOLIC BLOOD PRESSURE: 81 MMHG | SYSTOLIC BLOOD PRESSURE: 160 MMHG

## 2023-06-30 DIAGNOSIS — N18.9 CKD (CHRONIC KIDNEY DISEASE): Primary | ICD-10-CM

## 2023-06-30 DIAGNOSIS — D63.1 ANEMIA IN STAGE 3 CHRONIC KIDNEY DISEASE: ICD-10-CM

## 2023-06-30 DIAGNOSIS — D50.9 IRON DEFICIENCY ANEMIA, UNSPECIFIED IRON DEFICIENCY ANEMIA TYPE: ICD-10-CM

## 2023-06-30 DIAGNOSIS — N18.30 ANEMIA IN STAGE 3 CHRONIC KIDNEY DISEASE: ICD-10-CM

## 2023-06-30 DIAGNOSIS — N18.9 CKD (CHRONIC KIDNEY DISEASE): ICD-10-CM

## 2023-06-30 DIAGNOSIS — D63.1 ANEMIA IN CHRONIC KIDNEY DISEASE, UNSPECIFIED CKD STAGE: ICD-10-CM

## 2023-06-30 DIAGNOSIS — D63.1 ANEMIA IN STAGE 3 CHRONIC KIDNEY DISEASE, UNSPECIFIED WHETHER STAGE 3A OR 3B CKD: ICD-10-CM

## 2023-06-30 DIAGNOSIS — N18.30 ANEMIA IN STAGE 3 CHRONIC KIDNEY DISEASE, UNSPECIFIED WHETHER STAGE 3A OR 3B CKD: ICD-10-CM

## 2023-06-30 DIAGNOSIS — N18.9 ANEMIA IN CHRONIC KIDNEY DISEASE, UNSPECIFIED CKD STAGE: ICD-10-CM

## 2023-06-30 DIAGNOSIS — Z53.1 REFUSAL OF BLOOD TRANSFUSIONS AS PATIENT IS JEHOVAH'S WITNESS: ICD-10-CM

## 2023-06-30 LAB
BASOPHILS # BLD AUTO: 0.03 K/UL (ref 0–0.2)
BASOPHILS NFR BLD: 0.8 % (ref 0–1.9)
DIFFERENTIAL METHOD: ABNORMAL
EOSINOPHIL # BLD AUTO: 0.1 K/UL (ref 0–0.5)
EOSINOPHIL NFR BLD: 2.6 % (ref 0–8)
ERYTHROCYTE [DISTWIDTH] IN BLOOD BY AUTOMATED COUNT: 13.1 % (ref 11.5–14.5)
HCT VFR BLD AUTO: 31 % (ref 37–48.5)
HGB BLD-MCNC: 10 G/DL (ref 12–16)
IMM GRANULOCYTES # BLD AUTO: 0.03 K/UL (ref 0–0.04)
IMM GRANULOCYTES NFR BLD AUTO: 0.8 % (ref 0–0.5)
LYMPHOCYTES # BLD AUTO: 1.2 K/UL (ref 1–4.8)
LYMPHOCYTES NFR BLD: 32.6 % (ref 18–48)
MCH RBC QN AUTO: 32.7 PG (ref 27–31)
MCHC RBC AUTO-ENTMCNC: 32.3 G/DL (ref 32–36)
MCV RBC AUTO: 101 FL (ref 82–98)
MONOCYTES # BLD AUTO: 0.4 K/UL (ref 0.3–1)
MONOCYTES NFR BLD: 11.6 % (ref 4–15)
NEUTROPHILS # BLD AUTO: 2 K/UL (ref 1.8–7.7)
NEUTROPHILS NFR BLD: 51.6 % (ref 38–73)
NRBC BLD-RTO: 0 /100 WBC
PLATELET # BLD AUTO: 256 K/UL (ref 150–450)
PMV BLD AUTO: 8.5 FL (ref 9.2–12.9)
RBC # BLD AUTO: 3.06 M/UL (ref 4–5.4)
WBC # BLD AUTO: 3.8 K/UL (ref 3.9–12.7)

## 2023-06-30 PROCEDURE — 85025 COMPLETE CBC W/AUTO DIFF WBC: CPT | Performed by: INTERNAL MEDICINE

## 2023-06-30 PROCEDURE — 36415 COLL VENOUS BLD VENIPUNCTURE: CPT | Performed by: INTERNAL MEDICINE

## 2023-06-30 PROCEDURE — 96372 THER/PROPH/DIAG INJ SC/IM: CPT

## 2023-06-30 PROCEDURE — 63600175 PHARM REV CODE 636 W HCPCS: Mod: JZ,JG | Performed by: INTERNAL MEDICINE

## 2023-06-30 RX ADMIN — EPOETIN ALFA-EPBX 40000 UNITS: 40000 INJECTION, SOLUTION INTRAVENOUS; SUBCUTANEOUS at 11:06

## 2023-06-30 NOTE — PLAN OF CARE
Pt arrived to unit via personal wheelchair, accompanied by family for injection therapy. Pt alert and oriented with no complaints upon arrival. Tolerated injection with no complaints. Discharged home, accompanied by family.

## 2023-07-11 ENCOUNTER — OFFICE VISIT (OUTPATIENT)
Dept: RHEUMATOLOGY | Facility: CLINIC | Age: 78
End: 2023-07-11
Payer: MEDICARE

## 2023-07-11 VITALS
HEART RATE: 93 BPM | BODY MASS INDEX: 23.29 KG/M2 | SYSTOLIC BLOOD PRESSURE: 162 MMHG | DIASTOLIC BLOOD PRESSURE: 89 MMHG | HEIGHT: 62 IN | WEIGHT: 126.56 LBS

## 2023-07-11 DIAGNOSIS — D86.9 SARCOIDOSIS: Primary | ICD-10-CM

## 2023-07-11 DIAGNOSIS — G72.9 MYOPATHY: ICD-10-CM

## 2023-07-11 PROCEDURE — 3079F DIAST BP 80-89 MM HG: CPT | Mod: CPTII,S$GLB,, | Performed by: INTERNAL MEDICINE

## 2023-07-11 PROCEDURE — 99214 OFFICE O/P EST MOD 30 MIN: CPT | Mod: 25,S$GLB,, | Performed by: INTERNAL MEDICINE

## 2023-07-11 PROCEDURE — 3077F SYST BP >= 140 MM HG: CPT | Mod: CPTII,S$GLB,, | Performed by: INTERNAL MEDICINE

## 2023-07-11 PROCEDURE — 1157F ADVNC CARE PLAN IN RCRD: CPT | Mod: CPTII,S$GLB,, | Performed by: INTERNAL MEDICINE

## 2023-07-11 PROCEDURE — 3079F PR MOST RECENT DIASTOLIC BLOOD PRESSURE 80-89 MM HG: ICD-10-PCS | Mod: CPTII,S$GLB,, | Performed by: INTERNAL MEDICINE

## 2023-07-11 PROCEDURE — 1159F PR MEDICATION LIST DOCUMENTED IN MEDICAL RECORD: ICD-10-PCS | Mod: CPTII,S$GLB,, | Performed by: INTERNAL MEDICINE

## 2023-07-11 PROCEDURE — 1160F PR REVIEW ALL MEDS BY PRESCRIBER/CLIN PHARMACIST DOCUMENTED: ICD-10-PCS | Mod: CPTII,S$GLB,, | Performed by: INTERNAL MEDICINE

## 2023-07-11 PROCEDURE — 1101F PT FALLS ASSESS-DOCD LE1/YR: CPT | Mod: CPTII,S$GLB,, | Performed by: INTERNAL MEDICINE

## 2023-07-11 PROCEDURE — 3288F FALL RISK ASSESSMENT DOCD: CPT | Mod: CPTII,S$GLB,, | Performed by: INTERNAL MEDICINE

## 2023-07-11 PROCEDURE — 1159F MED LIST DOCD IN RCRD: CPT | Mod: CPTII,S$GLB,, | Performed by: INTERNAL MEDICINE

## 2023-07-11 PROCEDURE — 3072F LOW RISK FOR RETINOPATHY: CPT | Mod: CPTII,S$GLB,, | Performed by: INTERNAL MEDICINE

## 2023-07-11 PROCEDURE — 99999 PR PBB SHADOW E&M-EST. PATIENT-LVL IV: CPT | Mod: PBBFAC,,, | Performed by: INTERNAL MEDICINE

## 2023-07-11 PROCEDURE — 99214 PR OFFICE/OUTPT VISIT, EST, LEVL IV, 30-39 MIN: ICD-10-PCS | Mod: 25,S$GLB,, | Performed by: INTERNAL MEDICINE

## 2023-07-11 PROCEDURE — 1125F PR PAIN SEVERITY QUANTIFIED, PAIN PRESENT: ICD-10-PCS | Mod: CPTII,S$GLB,, | Performed by: INTERNAL MEDICINE

## 2023-07-11 PROCEDURE — 3288F PR FALLS RISK ASSESSMENT DOCUMENTED: ICD-10-PCS | Mod: CPTII,S$GLB,, | Performed by: INTERNAL MEDICINE

## 2023-07-11 PROCEDURE — 3077F PR MOST RECENT SYSTOLIC BLOOD PRESSURE >= 140 MM HG: ICD-10-PCS | Mod: CPTII,S$GLB,, | Performed by: INTERNAL MEDICINE

## 2023-07-11 PROCEDURE — 3072F PR LOW RISK FOR RETINOPATHY: ICD-10-PCS | Mod: CPTII,S$GLB,, | Performed by: INTERNAL MEDICINE

## 2023-07-11 PROCEDURE — 96372 PR INJECTION,THERAP/PROPH/DIAG2ST, IM OR SUBCUT: ICD-10-PCS | Mod: S$GLB,,, | Performed by: INTERNAL MEDICINE

## 2023-07-11 PROCEDURE — 96372 THER/PROPH/DIAG INJ SC/IM: CPT | Mod: S$GLB,,, | Performed by: INTERNAL MEDICINE

## 2023-07-11 PROCEDURE — 1160F RVW MEDS BY RX/DR IN RCRD: CPT | Mod: CPTII,S$GLB,, | Performed by: INTERNAL MEDICINE

## 2023-07-11 PROCEDURE — 1101F PR PT FALLS ASSESS DOC 0-1 FALLS W/OUT INJ PAST YR: ICD-10-PCS | Mod: CPTII,S$GLB,, | Performed by: INTERNAL MEDICINE

## 2023-07-11 PROCEDURE — 1157F PR ADVANCE CARE PLAN OR EQUIV PRESENT IN MEDICAL RECORD: ICD-10-PCS | Mod: CPTII,S$GLB,, | Performed by: INTERNAL MEDICINE

## 2023-07-11 PROCEDURE — 99999 PR PBB SHADOW E&M-EST. PATIENT-LVL IV: ICD-10-PCS | Mod: PBBFAC,,, | Performed by: INTERNAL MEDICINE

## 2023-07-11 PROCEDURE — 1125F AMNT PAIN NOTED PAIN PRSNT: CPT | Mod: CPTII,S$GLB,, | Performed by: INTERNAL MEDICINE

## 2023-07-11 RX ORDER — TRIAMCINOLONE ACETONIDE 40 MG/ML
80 INJECTION, SUSPENSION INTRA-ARTICULAR; INTRAMUSCULAR ONCE
Status: COMPLETED | OUTPATIENT
Start: 2023-07-11 | End: 2023-07-11

## 2023-07-11 RX ORDER — PREDNISONE 1 MG/1
TABLET ORAL
Qty: 360 TABLET | Refills: 0 | Status: SHIPPED | OUTPATIENT
Start: 2023-07-11 | End: 2023-12-11 | Stop reason: SDUPTHER

## 2023-07-11 RX ADMIN — TRIAMCINOLONE ACETONIDE 80 MG: 40 INJECTION, SUSPENSION INTRA-ARTICULAR; INTRAMUSCULAR at 11:07

## 2023-07-11 NOTE — PROGRESS NOTES
Subjective:       Patient ID: Regino Lawrence is a 77 y.o. female.    Chief Complaint: Sarcoidosis    HPI:  Regino Lawrence is a 77 y.o. female with history of sarcoidosis with associated myopathy and   arthropathy. Sarcoidosis that was first manifested by muscle inflammation, low white   blood cell count, hair loss, skin involvement. She was treated in the   past with methotrexate and Plaquenil, both of which were ineffective.   Cellcept and Imuran caused some unknown side effect (she thinks it made her sick).   Colchicine was held due to low WBC.   Although methotrexate did not help in past it was retried and she felt it helped hair growth but did not help body aches.   She held MTX due to an URI but patient has not wanted restarted since then (2013).   Leflunomide was held due to elevated BP after less than a week of use.  July 2021 hospitalized after passing out at home and fracturing L4 .  She was thought to have a seizure. During hospitalization found to have bilateral PE and was placed on Eliquis.      Interval History:   Now with flare of muscle and joint pains.   Difficulty sleeping due to pain.  Last steroid injection helped for 2-3 months.   Puffy face and arms.   Pain 10/10 ache in entire body.  Activity worsens pain.  Steroid helps very little currently.  Patient requesting steroid injection.    Currently on prednisone on 5 mg daily.           Review of Systems   Constitutional:  Positive for fatigue. Negative for fever and unexpected weight change.   HENT:  Negative for mouth sores and trouble swallowing.         Dry mouth   Eyes:  Negative for redness.        Dry eyes   Respiratory: Negative.  Negative for cough and shortness of breath.    Cardiovascular: Negative.  Negative for chest pain.   Gastrointestinal: Negative.  Negative for constipation and diarrhea.   Endocrine: Negative.    Genitourinary: Negative.  Negative for dysuria and genital sores.   Musculoskeletal:  Positive for arthralgias, back  "pain, joint swelling and myalgias.   Skin: Negative.  Negative for rash.   Allergic/Immunologic: Negative.    Neurological: Negative.  Negative for headaches.   Hematological: Negative.  Does not bruise/bleed easily.   Psychiatric/Behavioral: Negative.         Objective:   BP (!) 162/89   Pulse 93   Ht 5' 2" (1.575 m)   Wt 57.4 kg (126 lb 8.7 oz)   LMP  (LMP Unknown)   BMI 23.15 kg/m²   Pulse Ox 98% on RA     Physical Exam   Constitutional: She is oriented to person, place, and time.   HENT:   Head: Normocephalic and atraumatic.   Eyes: Conjunctivae are normal.   Cardiovascular: Normal rate, regular rhythm and normal heart sounds.   Pulmonary/Chest: Effort normal and breath sounds normal.   Abdominal: Soft. Bowel sounds are normal.   Musculoskeletal:      Comments: Muscle exam deferred due to pain  28 joint count: 24 tender (MCPs, PIPs, knees and shoulders) and 2 swollen (2nd MCP bilateral)   Pain at left trochanteric bursitis    No pain on compression of MTPs        Neurological: She is alert and oriented to person, place, and time.   With cane   Skin: Skin is warm and dry.   Psychiatric: Mood and affect normal.   LABS      Component      Latest Ref Rng & Units 6/30/2023 6/16/2023   WBC      3.90 - 12.70 K/uL 3.80 (L) 4.48   RBC      4.00 - 5.40 M/uL 3.06 (L) 3.39 (L)   Hemoglobin      12.0 - 16.0 g/dL 10.0 (L) 11.0 (L)   Hematocrit      37.0 - 48.5 % 31.0 (L) 33.7 (L)   MCV      82 - 98 fL 101 (H) 99 (H)   MCH      27.0 - 31.0 pg 32.7 (H) 32.4 (H)   MCHC      32.0 - 36.0 g/dL 32.3 32.6   RDW      11.5 - 14.5 % 13.1 13.5   Platelets      150 - 450 K/uL 256 263   MPV      9.2 - 12.9 fL 8.5 (L) 8.8 (L)   Immature Granulocytes      0.0 - 0.5 % 0.8 (H) 0.4   Gran # (ANC)      1.8 - 7.7 K/uL 2.0 2.9   Immature Grans (Abs)      0.00 - 0.04 K/uL 0.03 0.02   Lymph #      1.0 - 4.8 K/uL 1.2 1.1   Mono #      0.3 - 1.0 K/uL 0.4 0.4   Eos #      0.0 - 0.5 K/uL 0.1 0.1   Baso #      0.00 - 0.20 K/uL 0.03 0.02   nRBC      " 0 /100 WBC 0 0   Gran %      38.0 - 73.0 % 51.6 63.7   Lymph %      18.0 - 48.0 % 32.6 24.6   Mono %      4.0 - 15.0 % 11.6 9.8   Eosinophil %      0.0 - 8.0 % 2.6 1.1   Basophil %      0.0 - 1.9 % 0.8 0.4   Differential Method       Automated Automated   Sodium      136 - 145 mmol/L  130 (L)   Potassium      3.5 - 5.1 mmol/L  3.5   Chloride      95 - 110 mmol/L  96   CO2      23 - 29 mmol/L  25   Glucose      70 - 110 mg/dL  105   BUN      8 - 23 mg/dL  17   Creatinine      0.5 - 1.4 mg/dL  1.1   Calcium      8.7 - 10.5 mg/dL  9.4   PROTEIN TOTAL      6.0 - 8.4 g/dL  6.8   Albumin      3.5 - 5.2 g/dL  3.7   BILIRUBIN TOTAL      0.1 - 1.0 mg/dL  0.5   Alkaline Phosphatase      55 - 135 U/L  45 (L)   AST      10 - 40 U/L  19   ALT      10 - 44 U/L  14   Anion Gap      8 - 16 mmol/L  9   eGFR      >60 mL/min/1.73 m:2  52 (A)   Iron      30 - 160 ug/dL  94   Transferrin      200 - 375 mg/dL  268   TIBC      250 - 450 ug/dL  397   Saturated Iron      20 - 50 %  24   CPK      20 - 180 U/L  249 (H)   Aldolase      1.2 - 7.6 U/L  3.8   Sed Rate      0 - 20 mm/Hr  15   CRP      0.0 - 8.2 mg/L  7.3   Angio Convert Enzyme      16 - 85 U/L  50   Ferritin      20.0 - 300.0 ng/mL  162            Assessment:       1.   Sarcoidosis. Manifested by myopathy and arthropathy.  Persistent joint pain and myalgias despite prednisone 5 mg. Unable to tolerate immunosuppressants/steroid sparing agents for various reasons. Now with muscle spasms and all over body pain.   2.   Myalgia and myositis.  History of fibromyalgia   3.   Osteopenia. Took Fosamax for 5 years stopped 6/2013.  Awaiting patient decision on Prolia after she sees dentist (she has not been able to go).    4.   Fatigue     5.   Diabetes mellitus type 2 in nonobese.    6.           Neck pain. X-ray with degenerative changes. S/p laminectomy-cervical fusion C3-C7 11/16/2015 for cervical spinal stenosis.    7.           Back pain    8.           HTN.  Patient suspects BP  elevated due to pain  9.           SOB.  When blood count low.   10.         Anemia.  On Procrit  11.         Seizures.  New onset July 2021.  On chronic medication.  12.         Bilateral PE    Plan:       1. Labs   2. Kenalog 80 mg IM.  Consider tapering off prednisone since not helping.  Will try prednisone 4 mg daily for one month then taper by 1 mg monthly.  Risk of elevated BP and BG discussed.  3. [employer] stopped tramadol.  Still off hydrocodone/acetaminophen from primary doctor.  [employer] stopped Flexeril so switch to tizanidine.  Tizanidine stopped after seizure but was restarted without any issues.   Continues to take seizure medication.  4. Following with nephrology  5. DXA with osteopenia of hip total and femoral neck. FRAX does not suggest treatment however if prednisone goes to 7.5 mg or more will consider Prolia (due renal insufficiency).  Information provided for patient to review.  She read information but did not see dentist to have teeth pulled.  6.  Follow with Dr. Conner regarding spine issues, fracture and pain in neck.  7.  Had COVID vaccine and booster                 RTO in 4 months.

## 2023-07-14 ENCOUNTER — INFUSION (OUTPATIENT)
Dept: INFUSION THERAPY | Facility: HOSPITAL | Age: 78
End: 2023-07-14
Attending: INTERNAL MEDICINE
Payer: MEDICARE

## 2023-07-14 VITALS
HEART RATE: 95 BPM | SYSTOLIC BLOOD PRESSURE: 155 MMHG | RESPIRATION RATE: 18 BRPM | OXYGEN SATURATION: 98 % | DIASTOLIC BLOOD PRESSURE: 73 MMHG

## 2023-07-14 DIAGNOSIS — Z53.1 REFUSAL OF BLOOD TRANSFUSIONS AS PATIENT IS JEHOVAH'S WITNESS: ICD-10-CM

## 2023-07-14 DIAGNOSIS — D50.9 IRON DEFICIENCY ANEMIA, UNSPECIFIED IRON DEFICIENCY ANEMIA TYPE: ICD-10-CM

## 2023-07-14 DIAGNOSIS — N18.30 ANEMIA IN STAGE 3 CHRONIC KIDNEY DISEASE, UNSPECIFIED WHETHER STAGE 3A OR 3B CKD: Primary | ICD-10-CM

## 2023-07-14 DIAGNOSIS — D63.1 ANEMIA IN STAGE 3 CHRONIC KIDNEY DISEASE: ICD-10-CM

## 2023-07-14 DIAGNOSIS — D63.1 ANEMIA IN CHRONIC KIDNEY DISEASE, UNSPECIFIED CKD STAGE: ICD-10-CM

## 2023-07-14 DIAGNOSIS — D63.1 ANEMIA IN STAGE 3 CHRONIC KIDNEY DISEASE, UNSPECIFIED WHETHER STAGE 3A OR 3B CKD: Primary | ICD-10-CM

## 2023-07-14 DIAGNOSIS — N18.9 ANEMIA IN CHRONIC KIDNEY DISEASE, UNSPECIFIED CKD STAGE: ICD-10-CM

## 2023-07-14 DIAGNOSIS — N18.30 ANEMIA IN STAGE 3 CHRONIC KIDNEY DISEASE: ICD-10-CM

## 2023-07-14 PROCEDURE — 96372 THER/PROPH/DIAG INJ SC/IM: CPT

## 2023-07-14 PROCEDURE — 63600175 PHARM REV CODE 636 W HCPCS: Mod: JZ,JG,EC | Performed by: INTERNAL MEDICINE

## 2023-07-14 RX ADMIN — EPOETIN ALFA-EPBX 40000 UNITS: 40000 INJECTION, SOLUTION INTRAVENOUS; SUBCUTANEOUS at 11:07

## 2023-07-14 NOTE — PLAN OF CARE
Pt denies having any complaints. Vital signs stable. Hemoglobin 10.1. No complications with injection.

## 2023-07-18 ENCOUNTER — TELEPHONE (OUTPATIENT)
Dept: RHEUMATOLOGY | Facility: CLINIC | Age: 78
End: 2023-07-18
Payer: MEDICARE

## 2023-07-18 DIAGNOSIS — R53.83 FATIGUE, UNSPECIFIED TYPE: ICD-10-CM

## 2023-07-18 DIAGNOSIS — D86.86 SARCOID ARTHROPATHY: ICD-10-CM

## 2023-07-18 DIAGNOSIS — D86.9 SARCOIDOSIS: Primary | ICD-10-CM

## 2023-07-18 DIAGNOSIS — G72.9 MYOPATHY: ICD-10-CM

## 2023-07-19 ENCOUNTER — PATIENT MESSAGE (OUTPATIENT)
Dept: FAMILY MEDICINE | Facility: CLINIC | Age: 78
End: 2023-07-19
Payer: MEDICARE

## 2023-07-20 ENCOUNTER — TELEPHONE (OUTPATIENT)
Dept: FAMILY MEDICINE | Facility: CLINIC | Age: 78
End: 2023-07-20
Payer: MEDICARE

## 2023-07-20 ENCOUNTER — HOSPITAL ENCOUNTER (OUTPATIENT)
Dept: RADIOLOGY | Facility: OTHER | Age: 78
Discharge: HOME | End: 2023-07-20
Payer: MEDICARE

## 2023-07-20 ENCOUNTER — OFFICE VISIT (OUTPATIENT)
Dept: URGENT CARE | Facility: CLINIC | Age: 78
End: 2023-07-20
Payer: MEDICARE

## 2023-07-20 VITALS
SYSTOLIC BLOOD PRESSURE: 132 MMHG | DIASTOLIC BLOOD PRESSURE: 74 MMHG | OXYGEN SATURATION: 95 % | HEART RATE: 92 BPM | HEIGHT: 62 IN | BODY MASS INDEX: 23.19 KG/M2 | RESPIRATION RATE: 18 BRPM | TEMPERATURE: 99 F | WEIGHT: 126 LBS

## 2023-07-20 DIAGNOSIS — R25.2 SPASMS OF THE HANDS OR FEET: ICD-10-CM

## 2023-07-20 DIAGNOSIS — M62.838 MUSCLE SPASM OF LEFT LOWER EXTREMITY: ICD-10-CM

## 2023-07-20 DIAGNOSIS — M79.642 PAIN IN BOTH HANDS: ICD-10-CM

## 2023-07-20 DIAGNOSIS — M79.662 PAIN IN LEFT LOWER LEG: Primary | ICD-10-CM

## 2023-07-20 DIAGNOSIS — M79.662 PAIN IN LEFT LOWER LEG: ICD-10-CM

## 2023-07-20 DIAGNOSIS — M79.641 PAIN IN BOTH HANDS: ICD-10-CM

## 2023-07-20 PROCEDURE — 93971 EXTREMITY STUDY: CPT | Mod: 26,LT,, | Performed by: RADIOLOGY

## 2023-07-20 PROCEDURE — 99213 OFFICE O/P EST LOW 20 MIN: CPT | Mod: S$GLB,,,

## 2023-07-20 PROCEDURE — 99213 PR OFFICE/OUTPT VISIT, EST, LEVL III, 20-29 MIN: ICD-10-PCS | Mod: S$GLB,,,

## 2023-07-20 PROCEDURE — 93971 US LOWER EXTREMITY VEINS LEFT: ICD-10-PCS | Mod: 26,LT,, | Performed by: RADIOLOGY

## 2023-07-20 PROCEDURE — 93971 EXTREMITY STUDY: CPT | Mod: TC,LT

## 2023-07-20 NOTE — TELEPHONE ENCOUNTER
----- Message from Yany Fong sent at 7/18/2023  7:50 PM CDT -----  Pt called [facility] on Call Triage line... was advised to go to ER.. stated she was unable to walk on her left leg and foot..

## 2023-07-20 NOTE — PATIENT INSTRUCTIONS
Take Tylenol for pain  Use the voltaren cream that has been previously prescribed for pain.  Take tizanidine that has been previously prescribed for msucle spasms.  Warm compresses      Please return or see your primary care doctor if you develop new or worsening symptoms.      You must understand that you've received an Urgent Care treatment only and that you may be released before all of your medical problems are known or treated. You, the patient, will arrange for follow up as instructed. If your symptoms worsen or fail to improve you should go to the Emergency Room.     If you are give a referral to specialist, please conform your appointment by calling [phone].

## 2023-07-20 NOTE — PROGRESS NOTES
"Subjective:      Patient ID: Regino Lawrence is a 77 y.o. female.    Vitals:  height is 5' 2" (1.575 m) and weight is 57.2 kg (126 lb). Her oral temperature is 98.5 °F (36.9 °C). Her blood pressure is 132/74 and her pulse is 92. Her respiration is 18 and oxygen saturation is 95%.     Chief Complaint: Pain    Leg swelling or discoloration.  Patient is a 77-year-old female with history of sarcoid, arthritis, lumbar DDD, CKD stage 3, type 2 diabetes mellitus, PE on Eliquis who is presenting with bilateral hand and left leg pain.  States that she has had left hand pain and spasms for the past few months.  This morning, her right hand started hurting and having spasms similar to her left.  She also experienced left leg pain and spasms beginning last night.  Pain is in her knee, calf, ankles, feet.  Having difficulty walking because of the pain.  She states that she typically experiences spasms in her left leg every few weeks and usually has to wait for the past.  However, she was concerned because this is the worst it has been.  She is worried about a blood clot in her leg.  No recent trauma or falls.  She denies any fever, chest pain, shortness is a breath, numbness or tingling, focal weakness or loss of sensation, back or hip pain.    Pain  This is a new problem. The current episode started yesterday. The problem occurs constantly. The problem has been unchanged. Associated symptoms include arthralgias and myalgias. Pertinent negatives include no abdominal pain, chest pain, chills, congestion, coughing, fever, headaches, nausea, neck pain, numbness, rash, sore throat, vomiting or weakness. She has tried nothing for the symptoms.     Constitution: Negative for chills and fever.   HENT:  Negative for ear pain, congestion and sore throat.    Neck: Negative for neck pain and neck stiffness.   Cardiovascular:  Negative for chest pain, leg swelling and palpitations.   Eyes:  Negative for blurred vision.   Respiratory:  " Negative for cough and shortness of breath.    Gastrointestinal:  Negative for abdominal pain, nausea, vomiting and bowel incontinence.   Genitourinary:  Negative for dysuria and bladder incontinence.   Musculoskeletal:  Positive for pain, joint pain, muscle cramps and muscle ache. Negative for abnormal ROM of joint.   Skin:  Negative for rash, wound, erythema and bruising.   Allergic/Immunologic: Negative for itching.   Neurological:  Negative for dizziness, light-headedness, passing out, headaches, numbness and tingling.   Hematologic/Lymphatic: Positive for history of blood clots.    Objective:     Physical Exam   Constitutional: She is oriented to person, place, and time. She appears well-developed.   HENT:   Head: Normocephalic and atraumatic.   Ears:   Right Ear: External ear normal.   Left Ear: External ear normal.   Nose: Nose normal.   Mouth/Throat: Oropharynx is clear and moist.   Eyes: Conjunctivae, EOM and lids are normal.   Neck: Trachea normal and phonation normal. Neck supple.   Musculoskeletal:      Right wrist: She exhibits tenderness.      Left wrist: Normal.      Right hand: She exhibits decreased range of motion and tenderness.      Left hand: She exhibits decreased range of motion and tenderness.      Comments: Hand spasms BL. 2+ radial pulses. Cap refill <2s. Generalized tenderness to L knee, calf, and ankle. FROM of L hip, knee, ankle. No edema, bruising, varicose veins, rash. 2+ DP, PT pulses. Sensation to light touch intact. Negative Pari's.   Neurological: no focal deficit. She is alert and oriented to person, place, and time. She has normal motor skills and normal sensation. No cranial nerve deficit. Coordination normal. GCS eye subscore is 4. GCS verbal subscore is 5. GCS motor subscore is 6.   Skin: Skin is warm, dry, intact and no rash. Capillary refill takes less than 2 seconds. No bruising and No erythema   Psychiatric: Her speech is normal and behavior is normal. Judgment and  thought content normal.   Nursing note and vitals reviewed.    US Lower Extremity Veins Left    Result Date: 7/20/2023  EXAMINATION: US LOWER EXTREMITY VEINS LEFT CLINICAL HISTORY: Pain in left lower leg TECHNIQUE: Duplex and color flow Doppler evaluation and graded compression of the left lower extremity veins was performed. COMPARISON: 07/11/2021 FINDINGS: Left thigh veins: The common femoral, femoral, popliteal, upper greater saphenous, and deep femoral veins are patent and free of thrombus. The veins are normally compressible and have normal phasic flow and augmentation response. Left calf veins: The visualized calf veins are patent. Contralateral CFV: The contralateral (right) common femoral vein is patent and free of thrombus. Miscellaneous: None     No evidence of deep venous thrombosis in the left lower extremity. Electronically signed by: Patti De La Torre Date:    07/20/2023 Time:    14:02     Assessment:     1. Pain in left lower leg    2. Muscle spasm of left lower extremity    3. Spasms of the hands or feet    4. Pain in both hands      Plan:       Pain in left lower leg  -     US Lower Extremity Veins Left; Future; Expected date: 07/20/2023    Muscle spasm of left lower extremity    Spasms of the hands or feet    Pain in both hands    2:13 PM - called patient to discuss ultrasound results. No evidence of  DVT          Patient Instructions     Take Tylenol for pain  Use the voltaren cream that has been previously prescribed for pain.  Take tizanidine that has been previously prescribed for msucle spasms.  Warm compresses      Please return or see your primary care doctor if you develop new or worsening symptoms.      You must understand that you've received an Urgent Care treatment only and that you may be released before all of your medical problems are known or treated. You, the patient, will arrange for follow up as instructed. If your symptoms worsen or fail to improve you should go to the Emergency  Room.     If you are give a referral to specialist, please conform your appointment by calling [phone].

## 2023-07-28 ENCOUNTER — INFUSION (OUTPATIENT)
Dept: INFUSION THERAPY | Facility: HOSPITAL | Age: 78
End: 2023-07-28
Attending: INTERNAL MEDICINE
Payer: MEDICARE

## 2023-07-28 VITALS
HEART RATE: 96 BPM | TEMPERATURE: 98 F | OXYGEN SATURATION: 98 % | RESPIRATION RATE: 18 BRPM | SYSTOLIC BLOOD PRESSURE: 160 MMHG | DIASTOLIC BLOOD PRESSURE: 75 MMHG

## 2023-07-28 DIAGNOSIS — N18.30 ANEMIA IN STAGE 3 CHRONIC KIDNEY DISEASE: ICD-10-CM

## 2023-07-28 DIAGNOSIS — D63.1 ANEMIA IN STAGE 3 CHRONIC KIDNEY DISEASE, UNSPECIFIED WHETHER STAGE 3A OR 3B CKD: Primary | ICD-10-CM

## 2023-07-28 DIAGNOSIS — N18.9 ANEMIA IN CHRONIC KIDNEY DISEASE, UNSPECIFIED CKD STAGE: ICD-10-CM

## 2023-07-28 DIAGNOSIS — D63.1 ANEMIA IN CHRONIC KIDNEY DISEASE, UNSPECIFIED CKD STAGE: ICD-10-CM

## 2023-07-28 DIAGNOSIS — D50.9 IRON DEFICIENCY ANEMIA, UNSPECIFIED IRON DEFICIENCY ANEMIA TYPE: ICD-10-CM

## 2023-07-28 DIAGNOSIS — Z53.1 REFUSAL OF BLOOD TRANSFUSIONS AS PATIENT IS JEHOVAH'S WITNESS: ICD-10-CM

## 2023-07-28 DIAGNOSIS — D63.1 ANEMIA IN STAGE 3 CHRONIC KIDNEY DISEASE: ICD-10-CM

## 2023-07-28 DIAGNOSIS — N18.30 ANEMIA IN STAGE 3 CHRONIC KIDNEY DISEASE, UNSPECIFIED WHETHER STAGE 3A OR 3B CKD: Primary | ICD-10-CM

## 2023-07-28 PROCEDURE — 96372 THER/PROPH/DIAG INJ SC/IM: CPT

## 2023-07-28 PROCEDURE — 63600175 PHARM REV CODE 636 W HCPCS: Mod: JZ,JG | Performed by: INTERNAL MEDICINE

## 2023-07-28 RX ADMIN — EPOETIN ALFA-EPBX 40000 UNITS: 40000 INJECTION, SOLUTION INTRAVENOUS; SUBCUTANEOUS at 11:07

## 2023-07-28 NOTE — PLAN OF CARE
Pt arrived to unit via wheelchair, accompanied by family, for injection therapy. Pt alert and oriented, c/o fatigue upon arrival. Tolerated injection with no problems. Discharged home upon completion.

## 2023-08-11 ENCOUNTER — LAB VISIT (OUTPATIENT)
Dept: LAB | Facility: HOSPITAL | Age: 78
End: 2023-08-11
Attending: INTERNAL MEDICINE
Payer: MEDICARE

## 2023-08-11 DIAGNOSIS — N18.9 CKD (CHRONIC KIDNEY DISEASE): ICD-10-CM

## 2023-08-11 LAB
BASOPHILS # BLD AUTO: 0.02 K/UL (ref 0–0.2)
BASOPHILS NFR BLD: 0.4 % (ref 0–1.9)
DIFFERENTIAL METHOD: ABNORMAL
EOSINOPHIL # BLD AUTO: 0 K/UL (ref 0–0.5)
EOSINOPHIL NFR BLD: 0.8 % (ref 0–8)
ERYTHROCYTE [DISTWIDTH] IN BLOOD BY AUTOMATED COUNT: 14.1 % (ref 11.5–14.5)
HCT VFR BLD AUTO: 34.4 % (ref 37–48.5)
HGB BLD-MCNC: 11 G/DL (ref 12–16)
IMM GRANULOCYTES # BLD AUTO: 0.03 K/UL (ref 0–0.04)
IMM GRANULOCYTES NFR BLD AUTO: 0.6 % (ref 0–0.5)
LYMPHOCYTES # BLD AUTO: 1.3 K/UL (ref 1–4.8)
LYMPHOCYTES NFR BLD: 25.5 % (ref 18–48)
MCH RBC QN AUTO: 33.5 PG (ref 27–31)
MCHC RBC AUTO-ENTMCNC: 32 G/DL (ref 32–36)
MCV RBC AUTO: 105 FL (ref 82–98)
MONOCYTES # BLD AUTO: 0.5 K/UL (ref 0.3–1)
MONOCYTES NFR BLD: 9.3 % (ref 4–15)
NEUTROPHILS # BLD AUTO: 3.3 K/UL (ref 1.8–7.7)
NEUTROPHILS NFR BLD: 63.4 % (ref 38–73)
NRBC BLD-RTO: 0 /100 WBC
PLATELET # BLD AUTO: 258 K/UL (ref 150–450)
PMV BLD AUTO: 8.2 FL (ref 9.2–12.9)
RBC # BLD AUTO: 3.28 M/UL (ref 4–5.4)
WBC # BLD AUTO: 5.25 K/UL (ref 3.9–12.7)

## 2023-08-11 PROCEDURE — 36415 COLL VENOUS BLD VENIPUNCTURE: CPT | Performed by: INTERNAL MEDICINE

## 2023-08-11 PROCEDURE — 85025 COMPLETE CBC W/AUTO DIFF WBC: CPT | Performed by: INTERNAL MEDICINE

## 2023-08-16 ENCOUNTER — OFFICE VISIT (OUTPATIENT)
Dept: FAMILY MEDICINE | Facility: CLINIC | Age: 78
End: 2023-08-16
Payer: MEDICARE

## 2023-08-16 VITALS
HEART RATE: 92 BPM | OXYGEN SATURATION: 98 % | WEIGHT: 129.19 LBS | DIASTOLIC BLOOD PRESSURE: 74 MMHG | HEIGHT: 62 IN | SYSTOLIC BLOOD PRESSURE: 136 MMHG | BODY MASS INDEX: 23.77 KG/M2 | RESPIRATION RATE: 16 BRPM | TEMPERATURE: 98 F

## 2023-08-16 DIAGNOSIS — M54.42 CHRONIC BILATERAL LOW BACK PAIN WITH LEFT-SIDED SCIATICA: ICD-10-CM

## 2023-08-16 DIAGNOSIS — E11.3292 CONTROLLED TYPE 2 DIABETES MELLITUS WITH LEFT EYE AFFECTED BY MILD NONPROLIFERATIVE RETINOPATHY WITHOUT MACULAR EDEMA, WITHOUT LONG-TERM CURRENT USE OF INSULIN: ICD-10-CM

## 2023-08-16 DIAGNOSIS — M81.0 OSTEOPOROSIS, UNSPECIFIED OSTEOPOROSIS TYPE, UNSPECIFIED PATHOLOGICAL FRACTURE PRESENCE: Primary | ICD-10-CM

## 2023-08-16 DIAGNOSIS — M70.62 GREATER TROCHANTERIC BURSITIS OF LEFT HIP: ICD-10-CM

## 2023-08-16 DIAGNOSIS — G89.29 CHRONIC BILATERAL LOW BACK PAIN WITH LEFT-SIDED SCIATICA: ICD-10-CM

## 2023-08-16 DIAGNOSIS — D86.9 SARCOIDOSIS: Chronic | ICD-10-CM

## 2023-08-16 PROCEDURE — 3075F SYST BP GE 130 - 139MM HG: CPT | Mod: CPTII,S$GLB,, | Performed by: FAMILY MEDICINE

## 2023-08-16 PROCEDURE — 99999 PR PBB SHADOW E&M-EST. PATIENT-LVL IV: CPT | Mod: PBBFAC,,, | Performed by: FAMILY MEDICINE

## 2023-08-16 PROCEDURE — 1125F PR PAIN SEVERITY QUANTIFIED, PAIN PRESENT: ICD-10-PCS | Mod: CPTII,S$GLB,, | Performed by: FAMILY MEDICINE

## 2023-08-16 PROCEDURE — 1125F AMNT PAIN NOTED PAIN PRSNT: CPT | Mod: CPTII,S$GLB,, | Performed by: FAMILY MEDICINE

## 2023-08-16 PROCEDURE — 3078F DIAST BP <80 MM HG: CPT | Mod: CPTII,S$GLB,, | Performed by: FAMILY MEDICINE

## 2023-08-16 PROCEDURE — 99999 PR PBB SHADOW E&M-EST. PATIENT-LVL IV: ICD-10-PCS | Mod: PBBFAC,,, | Performed by: FAMILY MEDICINE

## 2023-08-16 PROCEDURE — 3078F PR MOST RECENT DIASTOLIC BLOOD PRESSURE < 80 MM HG: ICD-10-PCS | Mod: CPTII,S$GLB,, | Performed by: FAMILY MEDICINE

## 2023-08-16 PROCEDURE — 3075F PR MOST RECENT SYSTOLIC BLOOD PRESS GE 130-139MM HG: ICD-10-PCS | Mod: CPTII,S$GLB,, | Performed by: FAMILY MEDICINE

## 2023-08-16 PROCEDURE — 99214 OFFICE O/P EST MOD 30 MIN: CPT | Mod: S$GLB,,, | Performed by: FAMILY MEDICINE

## 2023-08-16 PROCEDURE — 1157F PR ADVANCE CARE PLAN OR EQUIV PRESENT IN MEDICAL RECORD: ICD-10-PCS | Mod: CPTII,S$GLB,, | Performed by: FAMILY MEDICINE

## 2023-08-16 PROCEDURE — 1157F ADVNC CARE PLAN IN RCRD: CPT | Mod: CPTII,S$GLB,, | Performed by: FAMILY MEDICINE

## 2023-08-16 PROCEDURE — 99214 PR OFFICE/OUTPT VISIT, EST, LEVL IV, 30-39 MIN: ICD-10-PCS | Mod: S$GLB,,, | Performed by: FAMILY MEDICINE

## 2023-08-16 RX ORDER — DULOXETIN HYDROCHLORIDE 30 MG/1
30 CAPSULE, DELAYED RELEASE ORAL DAILY
Qty: 90 CAPSULE | Refills: 3 | Status: SHIPPED | OUTPATIENT
Start: 2023-08-16 | End: 2024-03-18

## 2023-08-16 NOTE — PROGRESS NOTES
Chief Complaint   Patient presents with    Hypertension       HPI  Regino Lawrence is a 77 y.o. female with multiple medical diagnoses as listed in the medical history and problem list that presents for follow-up for HTN and chronic pain    Chronic pain- she has lumbar radiculopathy, s/p cervical fusion, bursitis in the left hip, she has tried pain medications and gabapentin but this was discontinued due to hospitalizations and frequent falls (one involving head injury). She also has sarcoidosis that is causing muscle inflammation and osteoporosis for which she takes fosamax but would like to get prolia instead, she has been taken off of oral prednisone and is transitioning to steroid injections for pain relief  HTN- has been stable on current medication regimen      ALLERGIES AND MEDICATIONS: updated and reviewed.  Review of patient's allergies indicates:   Allergen Reactions    Azathioprine Shortness Of Breath and Other (See Comments)     Fatigue     Medication List with Changes/Refills   New Medications    DULOXETINE (CYMBALTA) 30 MG CAPSULE    Take 1 capsule (30 mg total) by mouth once daily.   Current Medications    ACCU-CHEK FASTCLIX LANCING DEV KIT    USE AS DIRECTED.    ALBUTEROL-IPRATROPIUM (DUO-NEB) 2.5 MG-0.5 MG/3 ML NEBULIZER SOLUTION    Take 3 mLs by nebulization every 4 (four) hours as needed for Wheezing or Shortness of Breath. Rescue    ALCOHOL ANTISEPTIC PADS (ALCOHOL PREP PADS TOP)        APIXABAN (ELIQUIS) 5 MG TAB    Take 1 tablet (5 mg total) by mouth 2 (two) times daily. Start this prescription after finishing starter pack    ATORVASTATIN (LIPITOR) 20 MG TABLET    Take 1 tablet (20 mg total) by mouth once daily.    BLOOD SUGAR DIAGNOSTIC (ACCU-CHEK SMARTVIEW TEST STRIP) STRP    Use as directed to check blood sugar twice daily.    BLOOD SUGAR DIAGNOSTIC STRP    To check BG three times daily, to use with insurance preferred meter    BLOOD-GLUCOSE METER KIT    Use as instructed    BLOOD-GLUCOSE  METER KIT    To check BG three times daily, to use with insurance preferred meter    BRIVARACETAM (BRIVIACT) 75 MG TAB    Take 1 tablet (75 mg total) by mouth 2 (two) times daily.    CALCIUM CARBONATE (OS-MALCOLM) 600 MG CALCIUM (1,500 MG) TAB    Take 1 tablet by mouth 2 (two) times daily.     CARVEDILOL (COREG) 25 MG TABLET    Take 1 tablet (25 mg total) by mouth 2 (two) times daily.    CETIRIZINE (ZYRTEC) 10 MG TABLET    Take 1 tablet (10 mg total) by mouth once daily.    DICLOFENAC SODIUM (VOLTAREN) 1 % GEL    Apply 2 g topically once daily.    EPOETIN WOLFGANG-EPBX (RETACRIT) 10,000 UNIT/ML IMJECTION    Inject 20,000 Units into the skin every 14 (fourteen) days.    FENOFIBRATE 160 MG TAB    Take 1 tablet (160 mg total) by mouth once daily.    FLUTICASONE PROPION-SALMETEROL 115-21 MCG/DOSE (ADVAIR HFA) 115-21 MCG/ACTUATION HFAA INHALER    Inhale 2 puffs into the lungs every 12 (twelve) hours. Controller    FOLIC ACID (FOLVITE) 1 MG TABLET    Take 1 tablet (1,000 mcg total) by mouth once daily.    HYDRALAZINE (APRESOLINE) 25 MG TABLET    Take 2 tabs every 8 hours by mouth. Check BP 2 hours after each dose and if Blood pressure >160- please take 1 extra tab    INSULIN DETEMIR U-100 (LEVEMIR FLEXTOUCH) 100 UNIT/ML (3 ML) SUBQ INPN PEN    Inject 10 Units into the skin every evening.    KLGA KETOPROFEN 10% LIDOCAINE 10% GABAPENTIN 6% AMITRIPTYLINE 2% IN TRANSDERMAL CREAM    Apply 1 to 2 grams 3 to 4 times daily    LEVETIRACETAM (KEPPRA) 250 MG TAB    Take 1 tablet (250 mg total) by mouth 2 (two) times daily.    LEVOTHYROXINE (SYNTHROID) 50 MCG TABLET    Take 1 tablet (50 mcg total) by mouth before breakfast.    LINACLOTIDE (LINZESS) 72 MCG CAP CAPSULE    Take 1 capsule (72 mcg total) by mouth before breakfast.    NIFEDIPINE (PROCARDIA-XL) 90 MG (OSM) 24 HR TABLET    Take 1 tablet (90 mg total) by mouth once daily.    OLOPATADINE (PATANOL) 0.1 % OPHTHALMIC SOLUTION    Place 1 drop into both eyes daily as needed for Allergies.  "   PANTOPRAZOLE (PROTONIX) 40 MG TABLET    Take 1 tablet (40 mg total) by mouth once daily.    PEN NEEDLE, DIABETIC (NOVOFINE 32) 32 GAUGE X 1/4" NDLE    For use with CleanFishmir pen .    PREDNISONE (DELTASONE) 1 MG TABLET    4 tabs po daily for 1 month then decrease by 1 tab every month if labs allow    TIZANIDINE (ZANAFLEX) 2 MG TABLET    Take 2 tablets (4 mg total) by mouth every 8 (eight) hours as needed (muscle spasm).   Discontinued Medications    DULOXETINE (CYMBALTA) 20 MG CAPSULE    Take 1 capsule (20 mg total) by mouth once daily.       ROS  Review of Systems   Constitutional:  Negative for chills, diaphoresis, fatigue, fever and unexpected weight change.   HENT:  Negative for rhinorrhea, sinus pressure, sore throat and tinnitus.    Eyes:  Negative for photophobia and visual disturbance.   Respiratory:  Negative for cough, shortness of breath and wheezing.    Cardiovascular:  Negative for chest pain and palpitations.   Gastrointestinal:  Negative for abdominal pain, blood in stool, constipation, diarrhea, nausea and vomiting.   Genitourinary:  Negative for dysuria, flank pain, frequency and vaginal discharge.   Musculoskeletal:  Positive for arthralgias, back pain, myalgias and neck pain. Negative for joint swelling.   Skin:  Negative for rash.   Neurological:  Negative for speech difficulty, weakness, light-headedness and headaches.   Psychiatric/Behavioral:  Negative for behavioral problems and dysphoric mood.        Physical Exam  Vitals:    08/16/23 1046   BP: 136/74   BP Location: Left arm   Patient Position: Sitting   BP Method: Large (Manual)   Pulse: 92   Resp: 16   Temp: 98 °F (36.7 °C)   TempSrc: Oral   SpO2: 98%   Weight: 58.6 kg (129 lb 3 oz)   Height: 5' 2" (1.575 m)    Body mass index is 23.63 kg/m².  Weight: 58.6 kg (129 lb 3 oz)   Height: 5' 2" (157.5 cm)     Physical Exam  Vitals and nursing note reviewed.   Constitutional:       Appearance: She is well-developed.   Skin:     General: Skin is " warm and dry.      Findings: No erythema or rash.   Neurological:      Mental Status: She is alert. Mental status is at baseline.   Psychiatric:         Behavior: Behavior normal.         Health Maintenance         Date Due Completion Date    Shingles Vaccine (1 of 2) 07/20/2015 5/25/2015    Mammogram 09/14/2022 9/14/2020    COVID-19 Vaccine (5 - Pfizer risk series) 11/14/2022 9/19/2022    Diabetes Urine Screening 04/04/2023 4/4/2022    Hemoglobin A1c 06/09/2023 12/9/2022    Eye Exam 06/27/2023 6/27/2022    Influenza Vaccine (1) 09/01/2023 9/19/2022    Override on 8/8/2018: Done    Override on 9/27/2017: Done    Lipid Panel 12/09/2023 12/9/2022    DEXA Scan 11/07/2024 11/7/2022    TETANUS VACCINE 05/16/2026 5/16/2016            Health maintenance reviewed and addressed as ordered      ASSESSMENT/PLAN       1. Osteoporosis, unspecified osteoporosis type, unspecified pathological fracture presence  Will order prolia   - denosumab (PROLIA) injection 60 mg  - Prior authorization Order    2. 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 Sarcoidosis  Increase cymbalta as she gets some pain relief  - DULoxetine (CYMBALTA) 30 MG capsule; Take 1 capsule (30 mg total) by mouth once daily.  Dispense: 90 capsule; Refill: 3    4. Chronic bilateral low back pain with left-sided sciatica  Refer to pain mgmt as she is open to RFA ablation or injection for hip bursitis if she is a candidate   - Ambulatory referral/consult to Pain Clinic; Future  - DULoxetine (CYMBALTA) 30 MG capsule; Take 1 capsule (30 mg total) by mouth once daily.  Dispense: 90 capsule; Refill: 3    5. Greater trochanteric bursitis of left hip  Open to interventions if appropriate  - Ambulatory referral/consult to Pain Clinic; Future        Micaela Mendoza MD  08/16/2023 10:53 AM        Follow up in about 3 months  (around 11/16/2023) for management of chronic conditions.    Orders Placed This Encounter   Procedures    Hemoglobin A1C    Ambulatory referral/consult to Pain Clinic    Prior authorization Order

## 2023-08-23 ENCOUNTER — OFFICE VISIT (OUTPATIENT)
Dept: PAIN MEDICINE | Facility: CLINIC | Age: 78
End: 2023-08-23
Payer: MEDICARE

## 2023-08-23 ENCOUNTER — HOSPITAL ENCOUNTER (OUTPATIENT)
Dept: RADIOLOGY | Facility: OTHER | Age: 78
Discharge: HOME | End: 2023-08-23
Attending: NURSE PRACTITIONER
Payer: MEDICARE

## 2023-08-23 VITALS
OXYGEN SATURATION: 100 % | RESPIRATION RATE: 18 BRPM | BODY MASS INDEX: 24.42 KG/M2 | DIASTOLIC BLOOD PRESSURE: 75 MMHG | WEIGHT: 132.69 LBS | SYSTOLIC BLOOD PRESSURE: 141 MMHG | HEIGHT: 62 IN | HEART RATE: 101 BPM | TEMPERATURE: 97 F

## 2023-08-23 DIAGNOSIS — M47.816 LUMBAR SPONDYLOSIS: Primary | ICD-10-CM

## 2023-08-23 DIAGNOSIS — M70.62 GREATER TROCHANTERIC BURSITIS OF LEFT HIP: ICD-10-CM

## 2023-08-23 DIAGNOSIS — M51.36 DDD (DEGENERATIVE DISC DISEASE), LUMBAR: ICD-10-CM

## 2023-08-23 DIAGNOSIS — M54.9 DORSALGIA, UNSPECIFIED: ICD-10-CM

## 2023-08-23 DIAGNOSIS — E11.22 DIABETES MELLITUS WITH STAGE 3 CHRONIC KIDNEY DISEASE: ICD-10-CM

## 2023-08-23 DIAGNOSIS — G89.29 CHRONIC PAIN OF BOTH HIPS: ICD-10-CM

## 2023-08-23 DIAGNOSIS — M47.816 LUMBAR SPONDYLOSIS: ICD-10-CM

## 2023-08-23 DIAGNOSIS — N18.30 DIABETES MELLITUS WITH STAGE 3 CHRONIC KIDNEY DISEASE: ICD-10-CM

## 2023-08-23 DIAGNOSIS — G89.29 CHRONIC BILATERAL LOW BACK PAIN WITH LEFT-SIDED SCIATICA: ICD-10-CM

## 2023-08-23 DIAGNOSIS — M54.42 CHRONIC BILATERAL LOW BACK PAIN WITH LEFT-SIDED SCIATICA: ICD-10-CM

## 2023-08-23 DIAGNOSIS — M25.552 CHRONIC PAIN OF BOTH HIPS: ICD-10-CM

## 2023-08-23 DIAGNOSIS — M25.551 CHRONIC PAIN OF BOTH HIPS: ICD-10-CM

## 2023-08-23 PROCEDURE — 1160F RVW MEDS BY RX/DR IN RCRD: CPT | Mod: CPTII,S$GLB,, | Performed by: NURSE PRACTITIONER

## 2023-08-23 PROCEDURE — 73521 X-RAY EXAM HIPS BI 2 VIEWS: CPT | Mod: 26,,, | Performed by: RADIOLOGY

## 2023-08-23 PROCEDURE — 1160F PR REVIEW ALL MEDS BY PRESCRIBER/CLIN PHARMACIST DOCUMENTED: ICD-10-PCS | Mod: CPTII,S$GLB,, | Performed by: NURSE PRACTITIONER

## 2023-08-23 PROCEDURE — 3288F FALL RISK ASSESSMENT DOCD: CPT | Mod: CPTII,S$GLB,, | Performed by: NURSE PRACTITIONER

## 2023-08-23 PROCEDURE — 72114 X-RAY EXAM L-S SPINE BENDING: CPT | Mod: TC,FY

## 2023-08-23 PROCEDURE — 99999 PR PBB SHADOW E&M-EST. PATIENT-LVL V: CPT | Mod: PBBFAC,,, | Performed by: NURSE PRACTITIONER

## 2023-08-23 PROCEDURE — 72114 XR LUMBAR SPINE 5 VIEW WITH FLEX AND EXT: ICD-10-PCS | Mod: 26,,, | Performed by: RADIOLOGY

## 2023-08-23 PROCEDURE — 99999 PR PBB SHADOW E&M-EST. PATIENT-LVL V: ICD-10-PCS | Mod: PBBFAC,,, | Performed by: NURSE PRACTITIONER

## 2023-08-23 PROCEDURE — 73521 X-RAY EXAM HIPS BI 2 VIEWS: CPT | Mod: TC,FY

## 2023-08-23 PROCEDURE — 1101F PR PT FALLS ASSESS DOC 0-1 FALLS W/OUT INJ PAST YR: ICD-10-PCS | Mod: CPTII,S$GLB,, | Performed by: NURSE PRACTITIONER

## 2023-08-23 PROCEDURE — 72114 X-RAY EXAM L-S SPINE BENDING: CPT | Mod: 26,,, | Performed by: RADIOLOGY

## 2023-08-23 PROCEDURE — 1125F AMNT PAIN NOTED PAIN PRSNT: CPT | Mod: CPTII,S$GLB,, | Performed by: NURSE PRACTITIONER

## 2023-08-23 PROCEDURE — 3288F PR FALLS RISK ASSESSMENT DOCUMENTED: ICD-10-PCS | Mod: CPTII,S$GLB,, | Performed by: NURSE PRACTITIONER

## 2023-08-23 PROCEDURE — 73521 XR HIPS BILATERAL 2 VIEW INCL AP PELVIS: ICD-10-PCS | Mod: 26,,, | Performed by: RADIOLOGY

## 2023-08-23 PROCEDURE — 3077F SYST BP >= 140 MM HG: CPT | Mod: CPTII,S$GLB,, | Performed by: NURSE PRACTITIONER

## 2023-08-23 PROCEDURE — 1101F PT FALLS ASSESS-DOCD LE1/YR: CPT | Mod: CPTII,S$GLB,, | Performed by: NURSE PRACTITIONER

## 2023-08-23 PROCEDURE — 99214 OFFICE O/P EST MOD 30 MIN: CPT | Mod: S$GLB,,, | Performed by: NURSE PRACTITIONER

## 2023-08-23 PROCEDURE — 1159F PR MEDICATION LIST DOCUMENTED IN MEDICAL RECORD: ICD-10-PCS | Mod: CPTII,S$GLB,, | Performed by: NURSE PRACTITIONER

## 2023-08-23 PROCEDURE — 3078F DIAST BP <80 MM HG: CPT | Mod: CPTII,S$GLB,, | Performed by: NURSE PRACTITIONER

## 2023-08-23 PROCEDURE — 1159F MED LIST DOCD IN RCRD: CPT | Mod: CPTII,S$GLB,, | Performed by: NURSE PRACTITIONER

## 2023-08-23 PROCEDURE — 3078F PR MOST RECENT DIASTOLIC BLOOD PRESSURE < 80 MM HG: ICD-10-PCS | Mod: CPTII,S$GLB,, | Performed by: NURSE PRACTITIONER

## 2023-08-23 PROCEDURE — 1125F PR PAIN SEVERITY QUANTIFIED, PAIN PRESENT: ICD-10-PCS | Mod: CPTII,S$GLB,, | Performed by: NURSE PRACTITIONER

## 2023-08-23 PROCEDURE — 1157F ADVNC CARE PLAN IN RCRD: CPT | Mod: CPTII,S$GLB,, | Performed by: NURSE PRACTITIONER

## 2023-08-23 PROCEDURE — 3077F PR MOST RECENT SYSTOLIC BLOOD PRESSURE >= 140 MM HG: ICD-10-PCS | Mod: CPTII,S$GLB,, | Performed by: NURSE PRACTITIONER

## 2023-08-23 PROCEDURE — 99214 PR OFFICE/OUTPT VISIT, EST, LEVL IV, 30-39 MIN: ICD-10-PCS | Mod: S$GLB,,, | Performed by: NURSE PRACTITIONER

## 2023-08-23 PROCEDURE — 1157F PR ADVANCE CARE PLAN OR EQUIV PRESENT IN MEDICAL RECORD: ICD-10-PCS | Mod: CPTII,S$GLB,, | Performed by: NURSE PRACTITIONER

## 2023-08-23 NOTE — TELEPHONE ENCOUNTER
Refill Routing Note   Medication(s) are not appropriate for processing by [facility] for the following reason(s):      Required labs outdated    [facility] action(s):  Defer Care Due:  Labs due            Appointments  past 12m or future 3m with PCP    Date Provider   Last Visit   8/16/2023 Micaela Mendoza MD   Next Visit   11/16/2023 Micaela Mendoza MD   ED visits in past 90 days: 0        Note composed:1:50 PM 08/23/2023

## 2023-08-23 NOTE — TELEPHONE ENCOUNTER
Care Due:                  Date            Visit Type   Department     Provider  --------------------------------------------------------------------------------                                [facility]                              PRIMARY      MEDICINE/  Last Visit: 08-      CARE ([facility])   INTERNAL [place] Mendoza                              [facility]      MEDICINE/  Next Visit: 11-      CARE ([facility])   BENEDICT Sanz  Test          Frequency    Reason                     Performed    Due Date  --------------------------------------------------------------------------------    HBA1C.......  6 months...  insulin..................  12-   06-    TSH.........  12 months..  levothyroxine............  06- 06-    Health [facility] Embedded Care Due Messages. Reference number: [id number].   8/23/2023 9:54:29 AM CDT

## 2023-08-23 NOTE — PROGRESS NOTES
Chronic patient Established Note (Follow up visit)      SUBJECTIVE:    Interval History 8/23/2023:  The patient returns to clinic today for follow up of back pain. She has not been seen in 2 years. She reports worsened low back and hip pain. She reports intermittent radiating pain into her left leg. Her pain is worse with standing and walking. She does endorse morning stiffness. She has had several falls and questionable syncope. She is no longer taking Gabapentin. She has also been diagnosed with osteoporosis since last visit. She will be starting Prolia injections. She denies any other health changes. Her pain today is 10/10.    Interval History 8/17/2021:  The patient returns to clinic today for follow up of pain. She reports limited relief with compound cream started at last visit. She states the compound cream increased her BP and glucose levels. She reports increased pain. She reports increased bilateral hand and wrist pain. She also reports increased low back pain. This pain is worse with activity. She denies any radicular leg pain. Her pain is worse with prolonged standing and walking. She continues to wear brace from Dr. Conner. She continues to follow up with Neurology and PCP for seizures and syncope. She denies any other health changes. Her pain today is 10/10.    Interval History 8/5/2021:  The patient returns to clinic today for follow up of pain. Since last visit, she has been hospitalized due to episodes of syncope. She was brought to the ER for question of syncope versus seizure activity. She does have a T4 fracture. She was seen by Dr. Conner who does not recommend surgical intervention at this time. She continues to have drowsiness and hypotension intermittently. Her PCP has discontinued Gabapentin and Norco due to this. She reports increased low back and hip pain. She reports low back pain with radiating pain into the lateral aspect of her left leg to the top of her foot. Her pain is worse with  standing and walking. She does report right hip pain. She denies any other health changes. Her pain today is 10/10.    Interval History 3/31/2021:  The patient returns to clinic today for follow up of hip pain. She previously had 70% relief with left femoral and obturator block. We had discussed RFA but this was not scheduled due to illness. She continues to report left hip pain that is constant and sharp in nature. Her pain is worse with standing and walking. She denies any radicular leg pain. She denies any right sided hip pain. She continues to take Gabapentin, Zanaflex, and Norco per her PCP. She denies any other health changes. Her pain today is 10/10.    Interval History 11/2/2020:  The patient returns to clinic today for follow up of hip pain. She reports increased left hip pain. She describes this pain as sharp and stabbing in nature. She denies any radicular leg pain. Her pain is worse with standing and walking. She continues to take Gabapentin, Zanaflex, and Norco per her PCP. She denies any other health changes. Her pain today is 7/10.    Interval History 9/17/2020:  The patient returns to clinic today for follow up of low back pain. She is s/p left SI joint injection on 9/3/2020. She reports 30-40% relief of her left hip and buttock pain. She has been able to sit for longer periods of time without increased pain. She denies any radiating leg pain today. She continues to take Gabapentin, Zanaflex, and Norco per her PCP. She denies any other health changes. Her pain today is 7/10.    Interval History 8/11/2020:  The patient returns to clinic today for follow up of low back pain. She is s/p left hip and GTB injection on 7/27/2020. She reports limited relief of her pain. She continues to report left sided hip and buttock pain. She reports intermittent radiating pain down the lateral aspect of her left leg to her ankle. She describes this pain as sharp in nature. She denies any radiating leg pain. She  continues to take Gabapentin, Zanaflex, and Norco per PCP. She is performing some home stretches. She denies any other health changes. Her pain today is 10/10.    Interval History 7/14/2020:  Regino Lawrence presents to the clinic for a follow-up appointment for low back and hip pain. She is s/p bilateral L5/S1 TF RHIANNA on 6/29/2020. She reports 100% relief of her low back pain. She reports increased left hip pain. She describes this pain as constant, sharp, and stabbing in nature. Her pain is worse with sitting, walking, and laying on her left side. She denies any radicular leg pain. She continues to take Gabapentin, Zanaflex, and Norco per her PCP. She denies any other health changes. Her pain today is 10/10.     Pain Disability Index Review:      8/23/2023    11:59 AM 8/17/2021     2:37 PM 8/5/2021    11:38 AM   Last 3 PDI Scores   Pain Disability Index (PDI) 50 50 47       Pain Medications:  Gabapentin  Zanaflex  Norco    Opioid Contract: yes- with PCP     report:  Reviewed and consistent with medication use as prescribed.    Pain Procedures:   10/8/2018- Left L5 TF RHIANNA  12/6/2018- Bilateral L5 TF RHIANNA  3/21/2019- Left hip and GTB injection  7/22/2019- Left hip joint injection  9/12/2019- Left hip and GTB injection   12/15/2019- Bilateral L5 TF RHIANNA  6/29/2020- Bilateral L5/S1 TF RHIANNA  7/27/2020- Left hip and GTB injections  9/3/2020- Left SI joint injection   11/19/2020- Left femoral and obturator nerve block    Physical Therapy/Home Exercise: no    Imaging:   CT Lumbar 7/8/2021:  COMPARISON:  MRI lumbar spine 09/12/2018.     FINDINGS:  The lumbar spine demonstrates proper alignment.  The vertebral body heights appear well-maintained.  There is no evidence of fracture or osseous lytic or blastic process.  Prominent L5 superior endplate Schmorl's node.  Posterior disc osteophyte complex at L5-S1 resulting in mild spinal canal narrowing.  The intervertebral disk spaces appear well maintained.  No severe spinal  canal stenosis or high-grade neural foraminal narrowing.     The spinal canal otherwise appears unremarkable.  No intradural abnormalities are identified.  Evaluation of the surrounding soft tissues reveals calcific atherosclerosis of the visualized vasculature..     Impression:     No acute fracture or traumatic malalignment of the lumbar spine.     Multilevel degenerative changes although most pronounced at L5-S1.  No severe spinal canal stenosis or high-grade neural foraminal narrowing.     Additional findings in the body of the report.    MRI Cervical Spine 7/7/2021:  COMPARISON:  CT scan of the cervical spine dated 07/07/2021.     FINDINGS:  The visualized portions of the posterior fossa is unremarkable.  The predental space is maintained.  No prevertebral soft tissue swelling is identified.     There is unchanged appearance of mild anterior subluxation of C2 on C3.  There are postop changes of posterior instrumentation from C3 through C7.  The hardware is difficult to evaluate given significant susceptibility weighted artifact.  The significant adjacent level disease at the C2-C3 intervertebral disc level.     There is a subacute fracture involving the T4 vertebral body with extension into the posterior cortex.  There is approximately 50% loss of the vertebral body height.  The remainder of the vertebral body heights are maintained.  Suspected fractures at the level of T1 is difficult to discern with no significant edema at this level.     There is mild decrease in the caliber of the cord at the C2-C3 level.  The remainder of the cord is normal in signal.     Evaluation of the in the disc levels reveals the following:     C2-C3, there is a disc osteophyte complex along with facet hypertrophy and uncovertebral hypertrophy.  There is superimposed central disc protrusion.  There is adjacent level disease at this level.  There is severe narrowing the spinal canal.  There is severe bilateral neural foraminal  narrowing.     C3-C4, there is a disc osteophyte complex along with facet hypertrophy and uncovertebral hypertrophy.  There is moderate to severe spinal canal narrowing.  There is moderate bilateral neural foraminal narrowing.     C4-C5, spinal canal is widely patent.  There is mild bilateral neural foraminal narrowing.     C5-C6, the spinal canal is widely patent.  There is mild bilateral neural foraminal narrowing     C6-C7, the spinal canal is widely patent.  There is mild bilateral neural foraminal narrowing.     C7-T1, the spinal canal is within normal limits.  The neural foramina is unremarkable.     The paraspinal soft tissues are unremarkable.  Flow voids within the vertebral arteries are unremarkable.     Impression:     Acute to subacute burst compression fracture of the T4 vertebral body approximately 50% loss of vertebral body height.  Additional reported fractures of the prior CT scan difficult to discern on the current examination with no significant edema identified.     Changes of posterior instrumentation from C3 through C7 with adjacent level disease at the C2-C2 level with resultant subluxation of C2 on C3.     Moderate to severe narrowing of the spinal canal at the C2-C3 and C3-C4 levels secondary to the adjacent level disease.  Spine surgery evaluation is suggested.     Additional findings as above.     This report was flagged in Epic as abnormal.    MRI Thoracic Spine 7/8/2021:  COMPARISON:  MRI cervical spine 07/07/2021.     CT cervical spine 07/07/2021.     FINDINGS:  THORACIC     Alignment: Normal.     Vertebrae: Subacute burst fracture involving the T4 vertebral body with extension into the posterior cortex with approximately 50% loss of vertebral body height.  The remainder of the vertebral body heights are maintained.  Suspected fracture at the level of T1 is difficult to discern with no significant edema at this level.     Discs: Normal height and signal.     Cord: Normal.     Multilevel  degenerative findings without severe spinal canal stenosis or high-grade neural foraminal narrowing.     Paraspinal muscles & soft tissues: Bilateral renal cysts.     Impression:     Redemonstration of a subacute burst fracture involving the T4 vertebral body with extension into the posterior cortex and approximately 50% loss of vertebral body height.     Additional findings in the body of the report.     This report was flagged in Epic as abnormal    MRI Lumbar Spine 9/12/2018:  COMPARISON:  None.     FINDINGS:  The distal cord/conus demonstrates normal size and signal.  No evidence of osteomyelitis, marrow replacement process, or acute fracture.  No paraspinal masses.     At L1-2, there is asymmetric disc bulging to the right, resulting in mild right-sided neural foraminal narrowing.  No spinal canal stenosis.     L1-2 is unremarkable.     L2-3 demonstrates mild disc bulging slightly asymmetric to the left resulting in mild left-sided neural foraminal narrowing.  No spinal canal stenosis.     L4-5 demonstrates minimal anterolisthesis anterolisthesis.  There is mild facet arthropathy and disc bulging.  This results in mild left-sided neural foraminal narrowing.  No spinal canal stenosis.     At L5-S1, there is a moderate sized left lateral recess/foraminal disc extrusion effacing the left neural foramen, likely impinging upon the exiting left L5 nerve root.  There is left-sided degenerative disc disease, noting disc height loss and sub endplate marrow edema.  There is moderate bilateral facet arthropathy.  No spinal canal stenosis.     IMPRESSION:      Moderate size left foraminal disc extrusion at L5-S1, likely impinging upon the exiting left L5 nerve root.     Additional lumbar spondylosis, resulting in mild neural foraminal narrowing at L1-2, L2-3, and L4-5, as above.  No spinal canal stenosis.    Allergies:   Review of patient's allergies indicates:   Allergen Reactions    Azathioprine Shortness Of Breath and  Other (See Comments)     Fatigue       Current Medications:   Current Outpatient Medications   Medication Sig Dispense Refill    ACCU-CHEK FASTCLIX LANCING DEV Kit USE AS DIRECTED. 1 each 0    albuterol-ipratropium (DUO-NEB) 2.5 mg-0.5 mg/3 mL nebulizer solution Take 3 mLs by nebulization every 4 (four) hours as needed for Wheezing or Shortness of Breath. Rescue 90 each 11    ALCOHOL ANTISEPTIC PADS (ALCOHOL PREP PADS TOP)       apixaban (ELIQUIS) 5 mg Tab Take 1 tablet (5 mg total) by mouth 2 (two) times daily. Start this prescription after finishing starter pack 60 tablet 5    atorvastatin (LIPITOR) 20 MG tablet Take 1 tablet (20 mg total) by mouth once daily. 90 tablet 1    blood sugar diagnostic (ACCU-CHEK SMARTVIEW TEST STRIP) Strp Use as directed to check blood sugar twice daily. 200 strip 3    blood sugar diagnostic Strp To check BG three times daily, to use with insurance preferred meter 300 each 3    blood-glucose meter kit Use as instructed 1 each 0    blood-glucose meter kit To check BG three times daily, to use with insurance preferred meter 1 each 0    brivaracetam (BRIVIACT) 75 mg Tab Take 1 tablet (75 mg total) by mouth 2 (two) times daily. 60 tablet 2    calcium carbonate (OS-MALCOLM) 600 mg calcium (1,500 mg) Tab Take 1 tablet by mouth 2 (two) times daily.       carvediloL (COREG) 25 MG tablet Take 1 tablet (25 mg total) by mouth 2 (two) times daily. 180 tablet 3    cetirizine (ZYRTEC) 10 MG tablet Take 1 tablet (10 mg total) by mouth once daily. 30 tablet 0    diclofenac sodium (VOLTAREN) 1 % Gel Apply 2 g topically once daily. 100 g 5    DULoxetine (CYMBALTA) 30 MG capsule Take 1 capsule (30 mg total) by mouth once daily. 90 capsule 3    epoetin german-epbx (RETACRIT) 10,000 unit/mL imjection Inject 20,000 Units into the skin every 14 (fourteen) days.      fenofibrate 160 MG Tab Take 1 tablet (160 mg total) by mouth once daily. 90 tablet 1    fluticasone propion-salmeterol 115-21 mcg/dose (ADVAIR HFA)  "115-21 mcg/actuation HFAA inhaler Inhale 2 puffs into the lungs every 12 (twelve) hours. Controller 12 g 3    folic acid (FOLVITE) 1 MG tablet Take 1 tablet (1,000 mcg total) by mouth once daily. 90 tablet 1    hydrALAZINE (APRESOLINE) 25 MG tablet Take 2 tabs every 8 hours by mouth. Check BP 2 hours after each dose and if Blood pressure >160- please take 1 extra tab 120 tablet 1    insulin detemir U-100 (LEVEMIR FLEXTOUCH) 100 unit/mL (3 mL) SubQ InPn pen Inject 10 Units into the skin every evening. 3 mL 11    KLGA ketoprofen 10% LIDOcaine 10% gabapentin 6% amitriptyline 2% in transdermal cream Apply 1 to 2 grams 3 to 4 times daily 90 g 11    levETIRAcetam (KEPPRA) 250 MG Tab Take 1 tablet (250 mg total) by mouth 2 (two) times daily. 60 tablet 5    levothyroxine (SYNTHROID) 50 MCG tablet Take 1 tablet (50 mcg total) by mouth before breakfast. 90 tablet 3    linaCLOtide (LINZESS) 72 mcg Cap capsule Take 1 capsule (72 mcg total) by mouth before breakfast. 30 capsule 3    NIFEdipine (PROCARDIA-XL) 90 MG (OSM) 24 hr tablet Take 1 tablet (90 mg total) by mouth once daily. 90 tablet 1    olopatadine (PATANOL) 0.1 % ophthalmic solution Place 1 drop into both eyes daily as needed for Allergies. 5 mL 1    pantoprazole (PROTONIX) 40 MG tablet Take 1 tablet (40 mg total) by mouth once daily. 90 tablet 1    pen needle, diabetic (NOVOFINE 32) 32 gauge x 1/4" Ndle For use with [employer] pen . 100 each 3    predniSONE (DELTASONE) 1 MG tablet 4 tabs po daily for 1 month then decrease by 1 tab every month if labs allow 360 tablet 0    tiZANidine (ZANAFLEX) 2 MG tablet Take 2 tablets (4 mg total) by mouth every 8 (eight) hours as needed (muscle spasm). 270 tablet 1     Current Facility-Administered Medications   Medication Dose Route Frequency Provider Last Rate Last Admin    denosumab (PROLIA) injection 60 mg  60 mg Subcutaneous 1 time in Clinic/HOD Micaela Mendoza MD           REVIEW OF SYSTEMS:    GENERAL:  No weight loss, malaise " or fevers.  HEENT:  Negative for frequent or significant headaches.  NECK:  Negative for lumps, goiter, pain and significant neck swelling.  RESPIRATORY:  Negative for cough, wheezing or shortness of breath.  CARDIOVASCULAR:  Negative for chest pain, leg swelling or palpitations. HTN  GI:  Negative for abdominal discomfort, blood in stools or black stools or change in bowel habits.  RENAL: CKD  MUSCULOSKELETAL:  See HPI.  SKIN:  Negative for lesions, rash, and itching.  PSYCH:  Negative for sleep disturbance, mood disorder and recent psychosocial stressors.  HEMATOLOGY/LYMPHOLOGY:  Negative for prolonged bleeding, bruising easily or swollen nodes.  NEURO:   No history of headaches, syncope, paralysis, seizures or tremors.  ENDO: Diabetes, thyroid disorder.   All other reviewed and negative other than HPI.    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, without long-term current use of insulin     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, without long-term current use of insulin     Depression     Diabetes mellitus, type 2     Eye injury as a child     k-abrasion  od    Hyperlipidemia     Hypertension     Hypothyroidism     Immune deficiency disorder     Immune disorder     LOC (loss of consciousness) 03/12/2021    at home    Myalgia and myositis 9/6/2012    Osteoporosis     Polyneuropathy     Pulmonary embolism 7/10/2021    Renal manifestation of secondary diabetes mellitus     Sarcoidosis     Seizure     Type 2 diabetes mellitus     Ulcer     no cancer    Urinary incontinence        Past Surgical History:  Past Surgical History:   Procedure Laterality Date    CARPAL TUNNEL  RELEASE      Rt wrist    CATARACT EXTRACTION W/  INTRAOCULAR LENS IMPLANT Right 2015    Dr. Azevedo    CATARACT EXTRACTION W/  INTRAOCULAR LENS IMPLANT Left 2015    Dr. Azevedo    CERVICAL SPINE SURGERY       SECTION      CHOLECYSTECTOMY      INJECTION OF ANESTHETIC AGENT AROUND NERVE Left 2020    Procedure: BLOCK, NERVE LEFT FEMORAL AND OBTURATOR;  Surgeon: Alfonso Richards MD;  Location: [facility];  Service: Pain Management;  Laterality: Left;  NEEDS CONSENT    INJECTION OF JOINT Left 3/21/2019    Procedure: Injection, Joint  fLUOROSCOPIC jOINT iNJECTION (hIP iNJECTION) LEFT ROCH BURSA AS WELL LEFT TROCHANTERIC BURSA;  Surgeon: Alfonso Richards MD;  Location: [facility];  Service: Pain Management;  Laterality: Left;  NEEDS CONSENT, DIABETIC    INJECTION OF JOINT Left 2019    Procedure: Injection, Joint FLUOROSCOPIC JOINT INJECTION (HIP INJECTION) LEFT HIP;  Surgeon: Alfonso Richards MD;  Location: [facility];  Service: Pain Management;  Laterality: Left;  NEEDS CONSENT    INJECTION OF JOINT Left 2019    Procedure: INJECTION, JOINT;  Surgeon: Alfonso Richards MD;  Location: [facility];  Service: Pain Management;  Laterality: Left;  Left Hip and Left GTB Injections    INJECTION OF JOINT Left 2020    Procedure: INJECTION, JOINT, LEFT HIP and LEFT GREATER TROCHANTERIC BURSA;  Surgeon: Alfonso Richards MD;  Location: [facility];  Service: Pain Management;  Laterality: Left;  INJECTION, JOINT, LEFT HIP and LEFT GREATER TROCHANTERIC BURSA    INJECTION OF JOINT Left 9/3/2020    Procedure: INJECTION, JOINT, LEFT SI;  Surgeon: Alfonso Richards MD;  Location: [facility];  Service: Pain Management;  Laterality: Left;  INJECTION, JOINT, LEFT SI    TRANSFORAMINAL EPIDURAL INJECTION OF STEROID Bilateral 2019    Procedure: INJECTION, STEROID, EPIDURAL, TRANSFORAMINAL APPROACH;  Surgeon: Alfonso Richards MD;  Location: [facility];  Service: Pain Management;  Laterality: Bilateral;  B/L  TF RHIANNA L5  Consent Needed    TRANSFORAMINAL EPIDURAL INJECTION OF STEROID Bilateral 6/29/2020    Procedure: INJECTION, STEROID, EPIDURAL, TRANSFORAMINAL APPROACH L5/S1;  Surgeon: Alfonso Richards MD;  Location: [facility];  Service: Pain Management;  Laterality: Bilateral;  B/L TF RHIANNA L5/S1    TUBAL LIGATION         Family History:  Family History   Problem Relation Age of Onset    Hypertension Mother     Cataracts Mother     No Known Problems Father     Hypertension Maternal Grandmother     Glaucoma Sister     Arthritis Sister     No Known Problems Brother     No Known Problems Maternal Aunt     No Known Problems Maternal Uncle     No Known Problems Paternal Aunt     No Known Problems Paternal Uncle     No Known Problems Maternal Grandfather     No Known Problems Paternal Grandmother     No Known Problems Paternal Grandfather     Kidney failure Sister     Hepatitis Sister     Cancer Sister         bone cancer     Immunodeficiency Sister     Diabetes Son     Hypertension Son     Lupus Neg Hx     Rheum arthritis Neg Hx     Amblyopia Neg Hx     Blindness Neg Hx     Macular degeneration Neg Hx     Retinal detachment Neg Hx     Strabismus Neg Hx     Stroke Neg Hx     Thyroid disease Neg Hx     Endometrial cancer Neg Hx     Vaginal cancer Neg Hx     Cervical cancer Neg Hx        Social History:  Social History     Socioeconomic History    Marital status:      Spouse name: Gasper     Number of children: 5    Years of education: Business school after high school    Highest education level: 12th grade   Tobacco Use    Smoking status: Never     Passive exposure: Never    Smokeless tobacco: Never   Substance and Sexual Activity    Alcohol use: No    Drug use: No    Sexual activity: Not Currently     Partners: Male     Social Determinants of Health     Financial Resource Strain: Medium Risk (12/7/2021)    Overall Financial Resource Strain (CARDIA)     Difficulty of Paying Living Expenses: Somewhat hard   Food Insecurity:  "Food Insecurity Present (12/7/2021)    Hunger Vital Sign     Worried About Running Out of Food in the Last Year: Sometimes true     Ran Out of Food in the Last Year: Sometimes true   Transportation Needs: No Transportation Needs (12/7/2021)    PRAPARE - Transportation     Lack of Transportation (Medical): No     Lack of Transportation (Non-Medical): No   Physical Activity: Inactive (12/7/2021)    Exercise Vital Sign     Days of Exercise per Week: 0 days     Minutes of Exercise per Session: 0 min   Stress: Stress Concern Present (12/7/2021)    [employer] [place] [employer] - Occupational Stress Questionnaire     Feeling of Stress : Rather much   Social Connections: Moderately Integrated (12/7/2021)    Social Connection and Isolation Panel [NHANES]     Frequency of Communication with Friends and Family: More than three times a week     Frequency of Social Gatherings with Friends and Family: Once a week     Attends [employer] Services: 1 to 4 times per year     Active Member of Clubs or Organizations: No     Attends Club or Organization Meetings: Never     Marital Status:    Housing Stability: Low Risk  (12/7/2021)    Housing Stability Vital Sign     Unable to Pay for Housing in the Last Year: No     Number of Places Lived in the Last Year: 1     Unstable Housing in the Last Year: No       OBJECTIVE:    BP (!) 141/75   Pulse 101   Temp 97.3 °F (36.3 °C) (Oral)   Resp 18   Ht 5' 2" (1.575 m)   Wt 60.2 kg (132 lb 11.5 oz)   LMP  (LMP Unknown)   SpO2 100%   BMI 24.27 kg/m²     PHYSICAL EXAMINATION:    General appearance: Well appearing, in no acute distress, alert and oriented x3.  Psych:  Mood and affect appropriate.  Skin: Skin color, texture, turgor normal, no rashes or lesions, in both upper and lower body.  Head/face:  Atraumatic, normocephalic. No palpable lymph nodes.   Cor: RRR  Pulm: Symmetric chest rise, no respiratory distress noted.   Back: Straight leg raising in the sitting position " is negative to radicular pain bilaterally. There is pain with palpation over lumbar facet joints bilaterally. Limited ROM with pain on flexion and extension. Positive facet loading bilaterally.   Extremities:  No deformities, edema, or skin discoloration. Good capillary refill.  Musculoskeletal: There is pain with internal and external rotation of left hip. There is pain with palpation over SI joints bilaterally. FABERs is positive on the left. There is mild pain with palpation over bilateral GTB.  Bilateral lower extremity strength is normal and symmetric.  No atrophy or tone abnormalities are noted.  Neuro: Bilateral lower extremity coordination and muscle stretch reflexes are physiologic and symmetric.  Plantar response are downgoing. No loss of sensation is noted.  Gait: Antalgic- ambulates with electric scooter.     ASSESSMENT: 77 y.o. year old female with low back and hip pain, consistent with the followin. Lumbar spondylosis  X-Ray Lumbar Complete Including Flex And Ext      2. DDD (degenerative disc disease), lumbar        3. Chronic bilateral low back pain with left-sided sciatica  Ambulatory referral/consult to Pain Clinic      4. Dorsalgia, unspecified  MRI Lumbar Spine Without Contrast      5. Greater trochanteric bursitis of left hip  Ambulatory referral/consult to Pain Clinic    X-Ray Hips Bilateral 2 View Incl AP Pelvis      6. Chronic pain of both hips              PLAN:     - Previous imaging was reviewed and discussed with the patient today.    - Obtain updated lumbar xray/MRI. Obtain xray of hips.     - We will be cautious with steroid load given osteoporosis.     - Consider lumbar MBB in the future.     - I have stressed the importance of physical activity and a home exercise plan to help with pain and improve health.    - RTC after above imaging.     The above plan and management options were discussed at length with patient. Patient is in agreement with the above and verbalized  understanding.    Jenny Balbuena NP  08/23/2023    I spent a total of 30 minutes on the day of the visit.  This includes face to face time and non-face to face time preparing to see the patient by reviewing previous labs/imaging, obtaining and/or reviewing separately obtained history, documenting clinical information in the electronic or other health record, independently interpreting results and communicating results to the patient/family/caregiver.

## 2023-08-24 RX ORDER — INSULIN DETEMIR 100 [IU]/ML
INJECTION, SOLUTION SUBCUTANEOUS
Qty: 15 ML | Refills: 0 | Status: SHIPPED | OUTPATIENT
Start: 2023-08-24 | End: 2023-12-18

## 2023-08-25 ENCOUNTER — INFUSION (OUTPATIENT)
Dept: INFUSION THERAPY | Facility: HOSPITAL | Age: 78
End: 2023-08-25
Attending: INTERNAL MEDICINE
Payer: MEDICARE

## 2023-08-25 VITALS
DIASTOLIC BLOOD PRESSURE: 76 MMHG | TEMPERATURE: 98 F | RESPIRATION RATE: 16 BRPM | HEART RATE: 86 BPM | SYSTOLIC BLOOD PRESSURE: 168 MMHG | OXYGEN SATURATION: 98 %

## 2023-08-25 DIAGNOSIS — D50.9 IRON DEFICIENCY ANEMIA, UNSPECIFIED IRON DEFICIENCY ANEMIA TYPE: ICD-10-CM

## 2023-08-25 DIAGNOSIS — D63.1 ANEMIA IN CHRONIC KIDNEY DISEASE, UNSPECIFIED CKD STAGE: ICD-10-CM

## 2023-08-25 DIAGNOSIS — D63.1 ANEMIA IN STAGE 3 CHRONIC KIDNEY DISEASE: ICD-10-CM

## 2023-08-25 DIAGNOSIS — Z53.1 REFUSAL OF BLOOD TRANSFUSIONS AS PATIENT IS JEHOVAH'S WITNESS: ICD-10-CM

## 2023-08-25 DIAGNOSIS — N18.9 ANEMIA IN CHRONIC KIDNEY DISEASE, UNSPECIFIED CKD STAGE: ICD-10-CM

## 2023-08-25 DIAGNOSIS — N18.30 ANEMIA IN STAGE 3 CHRONIC KIDNEY DISEASE, UNSPECIFIED WHETHER STAGE 3A OR 3B CKD: Primary | ICD-10-CM

## 2023-08-25 DIAGNOSIS — D63.1 ANEMIA IN STAGE 3 CHRONIC KIDNEY DISEASE, UNSPECIFIED WHETHER STAGE 3A OR 3B CKD: Primary | ICD-10-CM

## 2023-08-25 DIAGNOSIS — N18.30 ANEMIA IN STAGE 3 CHRONIC KIDNEY DISEASE: ICD-10-CM

## 2023-08-25 PROCEDURE — 96372 THER/PROPH/DIAG INJ SC/IM: CPT

## 2023-08-25 PROCEDURE — 63600175 PHARM REV CODE 636 W HCPCS: Mod: JZ,JG | Performed by: INTERNAL MEDICINE

## 2023-08-25 RX ADMIN — EPOETIN ALFA-EPBX 40000 UNITS: 40000 INJECTION, SOLUTION INTRAVENOUS; SUBCUTANEOUS at 12:08

## 2023-08-25 NOTE — PLAN OF CARE
Patient arrived to clinic for scheduled injection. Labs were drawn prior to appt. They resulted and were reviewed. VSS. Patient's SBP was elevated upon arrival. BP was rechecked 30 minutes after patient took home PRN dose of Coreg. BP decreased upon recheck. Pt tolerated injection well.

## 2023-08-29 ENCOUNTER — TELEPHONE (OUTPATIENT)
Dept: FAMILY MEDICINE | Facility: CLINIC | Age: 78
End: 2023-08-29
Payer: MEDICARE

## 2023-08-29 NOTE — TELEPHONE ENCOUNTER
----- Message from Micaela Mendoza MD sent at 8/16/2023 11:03 AM CDT -----  Please follow up on prolia order

## 2023-08-30 ENCOUNTER — PATIENT MESSAGE (OUTPATIENT)
Dept: FAMILY MEDICINE | Facility: CLINIC | Age: 78
End: 2023-08-30
Payer: MEDICARE

## 2023-08-30 ENCOUNTER — OFFICE VISIT (OUTPATIENT)
Dept: HEMATOLOGY/ONCOLOGY | Facility: CLINIC | Age: 78
End: 2023-08-30
Payer: MEDICARE

## 2023-08-30 VITALS
DIASTOLIC BLOOD PRESSURE: 79 MMHG | OXYGEN SATURATION: 96 % | WEIGHT: 126.13 LBS | TEMPERATURE: 98 F | HEART RATE: 92 BPM | HEIGHT: 62 IN | SYSTOLIC BLOOD PRESSURE: 154 MMHG | BODY MASS INDEX: 23.21 KG/M2

## 2023-08-30 DIAGNOSIS — N18.9 ANEMIA IN CHRONIC KIDNEY DISEASE, UNSPECIFIED CKD STAGE: Primary | ICD-10-CM

## 2023-08-30 DIAGNOSIS — D50.9 IRON DEFICIENCY ANEMIA, UNSPECIFIED IRON DEFICIENCY ANEMIA TYPE: ICD-10-CM

## 2023-08-30 DIAGNOSIS — E11.44: Primary | ICD-10-CM

## 2023-08-30 DIAGNOSIS — D86.9 SARCOIDOSIS: ICD-10-CM

## 2023-08-30 DIAGNOSIS — Z53.1 REFUSAL OF BLOOD TRANSFUSIONS AS PATIENT IS JEHOVAH'S WITNESS: ICD-10-CM

## 2023-08-30 DIAGNOSIS — D63.1 ANEMIA IN CHRONIC KIDNEY DISEASE, UNSPECIFIED CKD STAGE: Primary | ICD-10-CM

## 2023-08-30 DIAGNOSIS — N18.9 CHRONIC KIDNEY DISEASE, UNSPECIFIED CKD STAGE: ICD-10-CM

## 2023-08-30 PROCEDURE — 99213 PR OFFICE/OUTPT VISIT, EST, LEVL III, 20-29 MIN: ICD-10-PCS | Mod: S$GLB,,, | Performed by: INTERNAL MEDICINE

## 2023-08-30 PROCEDURE — 3077F SYST BP >= 140 MM HG: CPT | Mod: CPTII,S$GLB,, | Performed by: INTERNAL MEDICINE

## 2023-08-30 PROCEDURE — 3078F PR MOST RECENT DIASTOLIC BLOOD PRESSURE < 80 MM HG: ICD-10-PCS | Mod: CPTII,S$GLB,, | Performed by: INTERNAL MEDICINE

## 2023-08-30 PROCEDURE — 3078F DIAST BP <80 MM HG: CPT | Mod: CPTII,S$GLB,, | Performed by: INTERNAL MEDICINE

## 2023-08-30 PROCEDURE — 99999 PR PBB SHADOW E&M-EST. PATIENT-LVL V: CPT | Mod: PBBFAC,,, | Performed by: INTERNAL MEDICINE

## 2023-08-30 PROCEDURE — 99999 PR PBB SHADOW E&M-EST. PATIENT-LVL V: ICD-10-PCS | Mod: PBBFAC,,, | Performed by: INTERNAL MEDICINE

## 2023-08-30 PROCEDURE — 3077F PR MOST RECENT SYSTOLIC BLOOD PRESSURE >= 140 MM HG: ICD-10-PCS | Mod: CPTII,S$GLB,, | Performed by: INTERNAL MEDICINE

## 2023-08-30 PROCEDURE — 99213 OFFICE O/P EST LOW 20 MIN: CPT | Mod: S$GLB,,, | Performed by: INTERNAL MEDICINE

## 2023-08-30 PROCEDURE — 1157F PR ADVANCE CARE PLAN OR EQUIV PRESENT IN MEDICAL RECORD: ICD-10-PCS | Mod: CPTII,S$GLB,, | Performed by: INTERNAL MEDICINE

## 2023-08-30 PROCEDURE — 1157F ADVNC CARE PLAN IN RCRD: CPT | Mod: CPTII,S$GLB,, | Performed by: INTERNAL MEDICINE

## 2023-08-30 PROCEDURE — 1159F PR MEDICATION LIST DOCUMENTED IN MEDICAL RECORD: ICD-10-PCS | Mod: CPTII,S$GLB,, | Performed by: INTERNAL MEDICINE

## 2023-08-30 PROCEDURE — 1125F AMNT PAIN NOTED PAIN PRSNT: CPT | Mod: CPTII,S$GLB,, | Performed by: INTERNAL MEDICINE

## 2023-08-30 PROCEDURE — 1159F MED LIST DOCD IN RCRD: CPT | Mod: CPTII,S$GLB,, | Performed by: INTERNAL MEDICINE

## 2023-08-30 PROCEDURE — 1125F PR PAIN SEVERITY QUANTIFIED, PAIN PRESENT: ICD-10-PCS | Mod: CPTII,S$GLB,, | Performed by: INTERNAL MEDICINE

## 2023-08-30 NOTE — PROGRESS NOTES
Subjective:        Patient ID: Regino Lawrence is a 77 y.o. female.    Chief Complaint: Follow-up anemia      Diagnosis: Anemia in CKD  Patient is a Gnosticism  .  Prior Hx;  The patient is seen today for f/u  chronic anemia in CKD undergoing EPO injections.The patient reports that she has been diagnosed with JOLLY in the past.  She has been on oral iron supplementation therapy, but could not tolerate or did not respond, she is uncertainShe also has  undergone intermittent IV iron therapy.  She is followed by GI and has undergone a colonoscopy earlier this year and was diagnosed with hemorrhoids for which she underwent banding procedure.  No melena, hematochezia,change in bowel habits.  She has also been diagnosed with B12 deficiency in the past, but reports she did not respond to B12 injections. No history of blood transfusions.  She is a Gnosticism.  She reports that she remembers getting injections in the 1970s when her blood count was low.   She is followed by Rheumatology for history of sarcoidosis with associated myopathy and arthropathy. She has been treated in the  past with methotrexate and Plaquenil, both of which were ineffectiveCellcept and imuran caused some unknown side effect. She is followed by PCP for DMShe was admitted 6/2022 with worsening SOB         Interval Hx;   She continues with Chronic diffuse arthralgias   Followed by pain management and Rheumatology   In [facility]  She is undergoing epo inj q 2wks  Hb 10.9g/dL on 8/25/23  No CP/cough  Chronic Mild fatigue  Mild FAUSTIN-stable  No melena, hematochezia or change in bowel habits  She also has  undergone intermittent IV iron therapy  She also has history of cervical spinal stenosis s/p posterior C3-C7 laminectomy and fusion on 11/16/15 by Dr. Conner.  She has received COVD vaccination       PAST MEDICAL HISTORY:  Acid reflux, alopecia, anemia, anxiety disorder, chronic  kidney disease, depression, diabetes mellitus type 2,  "hyperlipidemia,  hypertension, hypothyroidism, osteoporosis, sarcoidosis.    PAST SURGICAL HISTORY:  Cholecystectomy, , tubal ligation, carpal tunnel release, cataracts.    FAMILY HISTORY: Unremarkable for cancer. Significant for HTN.       Review of Systems   Constitutional:  Positive for fatigue (chronic, mild). Negative for activity change and appetite change.   HENT:  Negative for hearing loss and nosebleeds.    Eyes:  Negative for visual disturbance.   Respiratory:  Negative for cough and shortness of breath.    Cardiovascular:  Negative for chest pain and leg swelling.   Gastrointestinal:  Negative for abdominal pain, constipation, diarrhea and nausea.   Genitourinary:  Negative for flank pain and urgency.   Musculoskeletal:  Positive for arthralgias, back pain and myalgias. Negative for gait problem and joint swelling.   Skin:  Negative for rash.        No petechiae, ecchymoses   Neurological:  Negative for weakness, light-headedness and headaches.   Hematological:  Negative for adenopathy. Does not bruise/bleed easily.       Objective:         Vitals:    23 1419   BP: (!) 154/79   Pulse: 92   Temp: 98.2 °F (36.8 °C)   SpO2: 96%   Weight: 57.2 kg (126 lb 1.7 oz)   Height: 5' 2" (1.575 m)       Vitals:    23 1419   BP: (!) 154/79   Pulse: 92   Temp: 98.2 °F (36.8 °C)   SpO2: 96%   Weight: 57.2 kg (126 lb 1.7 oz)   Height: 5' 2" (1.575 m)           .Physical Exam   Constitutional: She is oriented to person, place, and time. She appears well-developed and well-nourished.   HENT:   Head: Normocephalic.   Eyes: Conjunctivae and lids are normal.No scleral icterus.   Neck: Normal range of motion. Neck supple. No thyromegaly present.   Cardiovascular: Normal rate, regular rhythm and normal heart sounds.    No murmur heard.  Pulmonary/Chest: Breath sounds normal. She has no wheezes. She has no rales.   Abdominal: Soft. Bowel sounds are normal. There is no tenderness. There is no rebound and no " guarding.   Musculoskeletal: Ambulates w/assistance of motorized scooter  Neurological: She is alert and oriented to person, place, and time. No cranial nerve deficit.  Skin: Skin is warm and dry. No ecchymosis, no petechiae and no rash noted. No erythema.   Psychiatric: She has a normal mood and affect.               Lab Results   Component Value Date    WBC 4.81 08/25/2023    HGB 10.9 (L) 08/25/2023    HCT 34.3 (L) 08/25/2023     (H) 08/25/2023     08/25/2023     Lab Results   Component Value Date    IRON 94 06/16/2023    TIBC 397 06/16/2023    FERRITIN 162 06/16/2023     SPEP-nl      CT renal 3/6/2017   IMPRESSION:  1.  No renal, ureteral or bladder calculi.  No hydronephrosis or ureterectasis.  2.  Poorly distended bladder.  Mild bladder wall prominence.  Mild cystitis cannot be   excluded.  3.  Moderate constipation.  Normal appendix      Lab Results   Component Value Date    IRON 94 06/16/2023    TIBC 397 06/16/2023    FERRITIN 162 06/16/2023     Lab Results   Component Value Date    WBC 4.81 08/25/2023    HGB 10.9 (L) 08/25/2023    HCT 34.3 (L) 08/25/2023     (H) 08/25/2023     08/25/2023         Assessment:       1. Anemia in chronic kidney disease, unspecified CKD stage    2. Iron deficiency anemia, unspecified iron deficiency anemia type    3. Refusal of blood transfusions as patient is Latter day    4. Chronic kidney disease, unspecified CKD stage    5. Sarcoidosis                Plan:   1.2.,3.    Pt is a Sikhism and declines/not interested in blood and blood products due to Anabaptism beliefs  She is undergoing epo inj q 2wks  Hb 10.9 g/dL on 8/25/23   Fe studies not done  Pt followed by Nephrology . It has been determined early CKD stage III, suspect due to age-related renal nephron loss, along with possible diabetic nephropathy v. hypertensive nephrosclerosis  CBC q2wks STANDING  Cont  EPO dosage  inj q 2wks( pending lab parameters)  Continue periodic  monitoring of Fe studies  According to KIDIGO guidelines patients with CKD , a  gfr , <60 cc/min and anemia, iron therapy is appropriate if the Tsat is < 30 and ferritin < 500 mg/dl.    Check Fe  Follow-up with PCP for med mgmt    4 f/b nephrology    5. F/b Rheumatology    CBC q2wks STANDING  Cont EPO  inj q 2wks( pending lab parameters) at increased dosage  F/u in 3mos with cbc, Fe studies prior to f/u         Advance Care Planning     Power of   I previously initiated the process of advance care planning today and explained the importance of this process to the patient.  I introduced the concept of advance directives to the patient, as well. Then the patient received detailed information about the importance of designating a Health Care Power of  (HCPOA). She was also instructed to communicate with this person about their wishes for future healthcare, should she become sick and lose decision-making capacity. The patient has  previously appointed a HCPOA. Pt completed in 2015           CC: Micaela Mendoza M.D.

## 2023-09-01 ENCOUNTER — HOSPITAL ENCOUNTER (OUTPATIENT)
Dept: RADIOLOGY | Facility: OTHER | Age: 78
Discharge: HOME | End: 2023-09-01
Attending: NURSE PRACTITIONER
Payer: MEDICARE

## 2023-09-01 ENCOUNTER — TELEPHONE (OUTPATIENT)
Dept: PAIN MEDICINE | Facility: CLINIC | Age: 78
End: 2023-09-01
Payer: MEDICARE

## 2023-09-01 DIAGNOSIS — M47.816 LUMBAR SPONDYLOSIS: Primary | ICD-10-CM

## 2023-09-01 DIAGNOSIS — M54.9 DORSALGIA, UNSPECIFIED: ICD-10-CM

## 2023-09-01 PROCEDURE — 72148 MRI LUMBAR SPINE W/O DYE: CPT | Mod: 26,,, | Performed by: RADIOLOGY

## 2023-09-01 PROCEDURE — 72148 MRI LUMBAR SPINE WITHOUT CONTRAST: ICD-10-PCS | Mod: 26,,, | Performed by: RADIOLOGY

## 2023-09-01 PROCEDURE — 72148 MRI LUMBAR SPINE W/O DYE: CPT | Mod: TC

## 2023-09-01 NOTE — TELEPHONE ENCOUNTER
Phoned patient to discuss MRI results. We discussed MBB. She is interested but requests sedation. We discussed that we typically do not do this procedure with sedation. Message sent to Dr. Richards to discuss possible sedation.

## 2023-09-05 NOTE — TELEPHONE ENCOUNTER
Phoned patient and discussed that Dr. Richards approved giving her Versed for the MBB. She verbalized understanding, she would like to move forward. Orders placed.

## 2023-09-07 ENCOUNTER — PATIENT MESSAGE (OUTPATIENT)
Dept: PAIN MEDICINE | Facility: OTHER | Age: 78
End: 2023-09-07
Payer: MEDICARE

## 2023-09-08 ENCOUNTER — LAB VISIT (OUTPATIENT)
Dept: LAB | Facility: HOSPITAL | Age: 78
End: 2023-09-08
Attending: INTERNAL MEDICINE
Payer: MEDICARE

## 2023-09-08 DIAGNOSIS — N18.9 CKD (CHRONIC KIDNEY DISEASE): ICD-10-CM

## 2023-09-08 DIAGNOSIS — E11.44: ICD-10-CM

## 2023-09-08 DIAGNOSIS — E11.3292 CONTROLLED TYPE 2 DIABETES MELLITUS WITH LEFT EYE AFFECTED BY MILD NONPROLIFERATIVE RETINOPATHY WITHOUT MACULAR EDEMA, WITHOUT LONG-TERM CURRENT USE OF INSULIN: ICD-10-CM

## 2023-09-08 LAB
ALBUMIN/CREAT UR: 58 UG/MG (ref 0–30)
BASOPHILS # BLD AUTO: 0.02 K/UL (ref 0–0.2)
BASOPHILS NFR BLD: 0.5 % (ref 0–1.9)
CREAT UR-MCNC: 69 MG/DL (ref 15–325)
DIFFERENTIAL METHOD: ABNORMAL
EOSINOPHIL # BLD AUTO: 0 K/UL (ref 0–0.5)
EOSINOPHIL NFR BLD: 1.1 % (ref 0–8)
ERYTHROCYTE [DISTWIDTH] IN BLOOD BY AUTOMATED COUNT: 13.1 % (ref 11.5–14.5)
ESTIMATED AVG GLUCOSE: 100 MG/DL (ref 68–131)
HBA1C MFR BLD: 5.1 % (ref 4–5.6)
HCT VFR BLD AUTO: 34.7 % (ref 37–48.5)
HGB BLD-MCNC: 11.2 G/DL (ref 12–16)
IMM GRANULOCYTES # BLD AUTO: 0.02 K/UL (ref 0–0.04)
IMM GRANULOCYTES NFR BLD AUTO: 0.5 % (ref 0–0.5)
LYMPHOCYTES # BLD AUTO: 1.1 K/UL (ref 1–4.8)
LYMPHOCYTES NFR BLD: 30 % (ref 18–48)
MCH RBC QN AUTO: 33.2 PG (ref 27–31)
MCHC RBC AUTO-ENTMCNC: 32.3 G/DL (ref 32–36)
MCV RBC AUTO: 103 FL (ref 82–98)
MICROALBUMIN UR DL<=1MG/L-MCNC: 40 UG/ML
MONOCYTES # BLD AUTO: 0.5 K/UL (ref 0.3–1)
MONOCYTES NFR BLD: 12.1 % (ref 4–15)
NEUTROPHILS # BLD AUTO: 2.1 K/UL (ref 1.8–7.7)
NEUTROPHILS NFR BLD: 55.8 % (ref 38–73)
NRBC BLD-RTO: 0 /100 WBC
PLATELET # BLD AUTO: 289 K/UL (ref 150–450)
PMV BLD AUTO: 8.3 FL (ref 9.2–12.9)
RBC # BLD AUTO: 3.37 M/UL (ref 4–5.4)
WBC # BLD AUTO: 3.8 K/UL (ref 3.9–12.7)

## 2023-09-08 PROCEDURE — 85025 COMPLETE CBC W/AUTO DIFF WBC: CPT | Performed by: INTERNAL MEDICINE

## 2023-09-08 PROCEDURE — 36415 COLL VENOUS BLD VENIPUNCTURE: CPT | Performed by: INTERNAL MEDICINE

## 2023-09-08 PROCEDURE — 82570 ASSAY OF URINE CREATININE: CPT | Performed by: FAMILY MEDICINE

## 2023-09-08 PROCEDURE — 83036 HEMOGLOBIN GLYCOSYLATED A1C: CPT | Performed by: FAMILY MEDICINE

## 2023-09-13 DIAGNOSIS — D63.1 ANEMIA IN CHRONIC KIDNEY DISEASE, UNSPECIFIED CKD STAGE: Primary | ICD-10-CM

## 2023-09-13 DIAGNOSIS — N18.9 ANEMIA IN CHRONIC KIDNEY DISEASE, UNSPECIFIED CKD STAGE: Primary | ICD-10-CM

## 2023-09-13 DIAGNOSIS — D50.9 IRON DEFICIENCY ANEMIA, UNSPECIFIED IRON DEFICIENCY ANEMIA TYPE: ICD-10-CM

## 2023-09-14 ENCOUNTER — CLINICAL SUPPORT (OUTPATIENT)
Dept: FAMILY MEDICINE | Facility: CLINIC | Age: 78
End: 2023-09-14
Payer: MEDICARE

## 2023-09-14 DIAGNOSIS — M81.0 OSTEOPOROSIS, UNSPECIFIED OSTEOPOROSIS TYPE, UNSPECIFIED PATHOLOGICAL FRACTURE PRESENCE: Primary | ICD-10-CM

## 2023-09-14 PROCEDURE — 99999 PR PBB SHADOW E&M-EST. PATIENT-LVL I: ICD-10-PCS | Mod: PBBFAC,,,

## 2023-09-14 PROCEDURE — 96372 THER/PROPH/DIAG INJ SC/IM: CPT | Mod: S$GLB,,, | Performed by: FAMILY MEDICINE

## 2023-09-14 PROCEDURE — 99999 PR PBB SHADOW E&M-EST. PATIENT-LVL I: CPT | Mod: PBBFAC,,,

## 2023-09-14 PROCEDURE — 96372 PR INJECTION,THERAP/PROPH/DIAG2ST, IM OR SUBCUT: ICD-10-PCS | Mod: S$GLB,,, | Performed by: FAMILY MEDICINE

## 2023-09-14 NOTE — PROGRESS NOTES
Prolia injection administered as ordered.  VIS given.  Tolerated well.  Told to wait in clinic for 15 mins.  Patient verbalized understanding.

## 2023-09-22 ENCOUNTER — INFUSION (OUTPATIENT)
Dept: INFUSION THERAPY | Facility: HOSPITAL | Age: 78
End: 2023-09-22
Attending: INTERNAL MEDICINE
Payer: MEDICARE

## 2023-09-22 VITALS
TEMPERATURE: 98 F | SYSTOLIC BLOOD PRESSURE: 175 MMHG | DIASTOLIC BLOOD PRESSURE: 85 MMHG | HEART RATE: 96 BPM | RESPIRATION RATE: 18 BRPM | OXYGEN SATURATION: 99 %

## 2023-09-22 DIAGNOSIS — D63.1 ANEMIA IN CHRONIC KIDNEY DISEASE, UNSPECIFIED CKD STAGE: ICD-10-CM

## 2023-09-22 DIAGNOSIS — N18.30 ANEMIA IN STAGE 3 CHRONIC KIDNEY DISEASE, UNSPECIFIED WHETHER STAGE 3A OR 3B CKD: Primary | ICD-10-CM

## 2023-09-22 DIAGNOSIS — D63.1 ANEMIA IN STAGE 3 CHRONIC KIDNEY DISEASE: ICD-10-CM

## 2023-09-22 DIAGNOSIS — D63.1 ANEMIA IN STAGE 3 CHRONIC KIDNEY DISEASE, UNSPECIFIED WHETHER STAGE 3A OR 3B CKD: Primary | ICD-10-CM

## 2023-09-22 DIAGNOSIS — D50.9 IRON DEFICIENCY ANEMIA, UNSPECIFIED IRON DEFICIENCY ANEMIA TYPE: ICD-10-CM

## 2023-09-22 DIAGNOSIS — N18.9 ANEMIA IN CHRONIC KIDNEY DISEASE, UNSPECIFIED CKD STAGE: ICD-10-CM

## 2023-09-22 DIAGNOSIS — Z53.1 REFUSAL OF BLOOD TRANSFUSIONS AS PATIENT IS JEHOVAH'S WITNESS: ICD-10-CM

## 2023-09-22 DIAGNOSIS — N18.30 ANEMIA IN STAGE 3 CHRONIC KIDNEY DISEASE: ICD-10-CM

## 2023-09-22 PROCEDURE — Q5106EC PHARM REV CODE 636 W HCPCS: Mod: JZ | Performed by: INTERNAL MEDICINE

## 2023-09-22 PROCEDURE — 96372 THER/PROPH/DIAG INJ SC/IM: CPT

## 2023-09-22 PROCEDURE — 63600175 PHARM REV CODE 636 W HCPCS: Mod: JZ | Performed by: INTERNAL MEDICINE

## 2023-09-22 RX ADMIN — EPOETIN ALFA-EPBX 40000 UNITS: 40000 INJECTION, SOLUTION INTRAVENOUS; SUBCUTANEOUS at 11:09

## 2023-09-22 NOTE — PLAN OF CARE
Problem: Fatigue  Goal: Improved Activity Tolerance  Outcome: Ongoing, Progressing   Patient entered unit via scooter. VSS, pressure slightly elevated, notified charge and patient. Labs reviewed, injection administered. No adverse reactions noted. Patient left via scooter.

## 2023-09-27 DIAGNOSIS — G89.4 CHRONIC PAIN SYNDROME: ICD-10-CM

## 2023-09-29 ENCOUNTER — OFFICE VISIT (OUTPATIENT)
Dept: OPHTHALMOLOGY | Facility: CLINIC | Age: 78
End: 2023-09-29
Payer: MEDICARE

## 2023-09-29 DIAGNOSIS — H17.9 CORNEAL SCAR, RIGHT EYE: ICD-10-CM

## 2023-09-29 DIAGNOSIS — H52.7 REFRACTIVE ERROR: ICD-10-CM

## 2023-09-29 DIAGNOSIS — E11.9 DM TYPE 2 WITHOUT RETINOPATHY: ICD-10-CM

## 2023-09-29 DIAGNOSIS — H10.13 ALLERGIC CONJUNCTIVITIS OF BOTH EYES: ICD-10-CM

## 2023-09-29 DIAGNOSIS — H04.123 DRY EYE SYNDROME, BILATERAL: Primary | ICD-10-CM

## 2023-09-29 DIAGNOSIS — Z96.1 PSEUDOPHAKIA: ICD-10-CM

## 2023-09-29 DIAGNOSIS — H34.8120 HEMISPHERIC RETINAL VEIN OCCLUSION WITH MACULAR EDEMA OF LEFT EYE: ICD-10-CM

## 2023-09-29 PROCEDURE — 1157F ADVNC CARE PLAN IN RCRD: CPT | Mod: CPTII,S$GLB,, | Performed by: OPHTHALMOLOGY

## 2023-09-29 PROCEDURE — 92014 PR EYE EXAM, EST PATIENT,COMPREHESV: ICD-10-PCS | Mod: S$GLB,,, | Performed by: OPHTHALMOLOGY

## 2023-09-29 PROCEDURE — 99999 PR PBB SHADOW E&M-EST. PATIENT-LVL III: CPT | Mod: PBBFAC,,, | Performed by: OPHTHALMOLOGY

## 2023-09-29 PROCEDURE — 1159F MED LIST DOCD IN RCRD: CPT | Mod: CPTII,S$GLB,, | Performed by: OPHTHALMOLOGY

## 2023-09-29 PROCEDURE — 99999 PR PBB SHADOW E&M-EST. PATIENT-LVL III: ICD-10-PCS | Mod: PBBFAC,,, | Performed by: OPHTHALMOLOGY

## 2023-09-29 PROCEDURE — 1159F PR MEDICATION LIST DOCUMENTED IN MEDICAL RECORD: ICD-10-PCS | Mod: CPTII,S$GLB,, | Performed by: OPHTHALMOLOGY

## 2023-09-29 PROCEDURE — 1160F RVW MEDS BY RX/DR IN RCRD: CPT | Mod: CPTII,S$GLB,, | Performed by: OPHTHALMOLOGY

## 2023-09-29 PROCEDURE — 1101F PT FALLS ASSESS-DOCD LE1/YR: CPT | Mod: CPTII,S$GLB,, | Performed by: OPHTHALMOLOGY

## 2023-09-29 PROCEDURE — 3288F PR FALLS RISK ASSESSMENT DOCUMENTED: ICD-10-PCS | Mod: CPTII,S$GLB,, | Performed by: OPHTHALMOLOGY

## 2023-09-29 PROCEDURE — 1126F PR PAIN SEVERITY QUANTIFIED, NO PAIN PRESENT: ICD-10-PCS | Mod: CPTII,S$GLB,, | Performed by: OPHTHALMOLOGY

## 2023-09-29 PROCEDURE — 1160F PR REVIEW ALL MEDS BY PRESCRIBER/CLIN PHARMACIST DOCUMENTED: ICD-10-PCS | Mod: CPTII,S$GLB,, | Performed by: OPHTHALMOLOGY

## 2023-09-29 PROCEDURE — 3288F FALL RISK ASSESSMENT DOCD: CPT | Mod: CPTII,S$GLB,, | Performed by: OPHTHALMOLOGY

## 2023-09-29 PROCEDURE — 1101F PR PT FALLS ASSESS DOC 0-1 FALLS W/OUT INJ PAST YR: ICD-10-PCS | Mod: CPTII,S$GLB,, | Performed by: OPHTHALMOLOGY

## 2023-09-29 PROCEDURE — 92014 COMPRE OPH EXAM EST PT 1/>: CPT | Mod: S$GLB,,, | Performed by: OPHTHALMOLOGY

## 2023-09-29 PROCEDURE — 1157F PR ADVANCE CARE PLAN OR EQUIV PRESENT IN MEDICAL RECORD: ICD-10-PCS | Mod: CPTII,S$GLB,, | Performed by: OPHTHALMOLOGY

## 2023-09-29 PROCEDURE — 1126F AMNT PAIN NOTED NONE PRSNT: CPT | Mod: CPTII,S$GLB,, | Performed by: OPHTHALMOLOGY

## 2023-09-30 NOTE — PROGRESS NOTES
Subjective:       Patient ID: Regino Lawrence is a 77 y.o. female.    Chief Complaint: Concerns About Ocular Health    HPI    DSL- 6/27/2022     78 y/o female present to clinic for routine eye exam. H/o of retinal vein   occlusion. Pt present to clinic for concerns of blurry vision, bilateral   and dry eyes. She denies floaters and flashes of lights. BSL was 165 last   night.       Eyemeds  Pataday BID OU      Hemoglobin A1C       Date                     Value               Ref Range             Status                09/08/2023               5.1                 4.0 - 5.6 %           Final                Last edited by Christa Osborne on 9/29/2023  2:09 PM.             Assessment:       1. Dry eye syndrome, bilateral    2. Allergic conjunctivitis of both eyes    3. Corneal scar, right eye    4. Hemispheric retinal vein occlusion with macular edema of left eye    5. DM type 2 without retinopathy    6. Refractive error    7. Pseudophakia        Plan:       JOSELINE OU-Doing well.  Allergic conj OU-Stable.  K scar OD-Stable.  Hemispheric RVO with ME-Needs F/U appt with Dr Baker.  DM-No NPDR OU.  RE-Pt wants MRx.        AT's.  Pataday.  Control DM.  Give MRx.  RTC Dr Baker on 10/25/23.  RTC me in 1 yr.

## 2023-10-03 ENCOUNTER — PATIENT MESSAGE (OUTPATIENT)
Dept: FAMILY MEDICINE | Facility: CLINIC | Age: 78
End: 2023-10-03
Payer: MEDICARE

## 2023-10-03 DIAGNOSIS — G89.4 CHRONIC PAIN SYNDROME: ICD-10-CM

## 2023-10-06 ENCOUNTER — INFUSION (OUTPATIENT)
Dept: INFUSION THERAPY | Facility: HOSPITAL | Age: 78
End: 2023-10-06
Attending: INTERNAL MEDICINE
Payer: MEDICARE

## 2023-10-06 VITALS
RESPIRATION RATE: 18 BRPM | DIASTOLIC BLOOD PRESSURE: 89 MMHG | TEMPERATURE: 99 F | HEART RATE: 91 BPM | SYSTOLIC BLOOD PRESSURE: 198 MMHG | OXYGEN SATURATION: 99 %

## 2023-10-06 DIAGNOSIS — D63.1 ANEMIA IN STAGE 3 CHRONIC KIDNEY DISEASE, UNSPECIFIED WHETHER STAGE 3A OR 3B CKD: Primary | ICD-10-CM

## 2023-10-06 DIAGNOSIS — Z53.1 REFUSAL OF BLOOD TRANSFUSIONS AS PATIENT IS JEHOVAH'S WITNESS: ICD-10-CM

## 2023-10-06 DIAGNOSIS — D63.1 ANEMIA IN STAGE 3 CHRONIC KIDNEY DISEASE: ICD-10-CM

## 2023-10-06 DIAGNOSIS — N18.30 ANEMIA IN STAGE 3 CHRONIC KIDNEY DISEASE: ICD-10-CM

## 2023-10-06 DIAGNOSIS — N18.9 ANEMIA IN CHRONIC KIDNEY DISEASE, UNSPECIFIED CKD STAGE: ICD-10-CM

## 2023-10-06 DIAGNOSIS — D50.9 IRON DEFICIENCY ANEMIA, UNSPECIFIED IRON DEFICIENCY ANEMIA TYPE: ICD-10-CM

## 2023-10-06 DIAGNOSIS — D63.1 ANEMIA IN CHRONIC KIDNEY DISEASE, UNSPECIFIED CKD STAGE: ICD-10-CM

## 2023-10-06 DIAGNOSIS — N18.30 ANEMIA IN STAGE 3 CHRONIC KIDNEY DISEASE, UNSPECIFIED WHETHER STAGE 3A OR 3B CKD: Primary | ICD-10-CM

## 2023-10-06 NOTE — PLAN OF CARE
Problem: Fatigue  Goal: Improved Activity Tolerance  Outcome: Ongoing, Progressing   Patient arrived to unit on scooter. Vitals taken and BP was elevated. Patient took PRN medicine, retook BP and it was higher than initial. Injection held. Patient was instructed to monitor BP and make sure she is taking her medicine accordingly.

## 2023-10-09 ENCOUNTER — HOSPITAL ENCOUNTER (OUTPATIENT)
Facility: OTHER | Age: 78
Discharge: HOME | End: 2023-10-09
Attending: ANESTHESIOLOGY | Admitting: ANESTHESIOLOGY
Payer: MEDICARE

## 2023-10-09 VITALS
TEMPERATURE: 97 F | HEIGHT: 62 IN | WEIGHT: 126 LBS | RESPIRATION RATE: 16 BRPM | DIASTOLIC BLOOD PRESSURE: 68 MMHG | SYSTOLIC BLOOD PRESSURE: 128 MMHG | HEART RATE: 81 BPM | BODY MASS INDEX: 23.19 KG/M2 | OXYGEN SATURATION: 99 %

## 2023-10-09 DIAGNOSIS — M47.896 OTHER OSTEOARTHRITIS OF SPINE, LUMBAR REGION: ICD-10-CM

## 2023-10-09 DIAGNOSIS — M51.36 DDD (DEGENERATIVE DISC DISEASE), LUMBAR: Primary | ICD-10-CM

## 2023-10-09 DIAGNOSIS — G89.29 CHRONIC PAIN: ICD-10-CM

## 2023-10-09 LAB — POCT GLUCOSE: 99 MG/DL (ref 70–110)

## 2023-10-09 PROCEDURE — 63600175 PHARM REV CODE 636 W HCPCS: Performed by: ANESTHESIOLOGY

## 2023-10-09 PROCEDURE — 64493 INJ PARAVERT F JNT L/S 1 LEV: CPT | Mod: 50 | Performed by: ANESTHESIOLOGY

## 2023-10-09 PROCEDURE — 25000003 PHARM REV CODE 250: Performed by: ANESTHESIOLOGY

## 2023-10-09 PROCEDURE — 82947 ASSAY GLUCOSE BLOOD QUANT: CPT | Performed by: ANESTHESIOLOGY

## 2023-10-09 PROCEDURE — 64494 INJ PARAVERT F JNT L/S 2 LEV: CPT | Mod: 50 | Performed by: ANESTHESIOLOGY

## 2023-10-09 PROCEDURE — 64493 PR INJ DX/THER AGNT PARAVERT FACET JOINT,IMG GUIDE,LUMBAR/SAC,1ST LVL: ICD-10-PCS | Mod: 50,KX,, | Performed by: ANESTHESIOLOGY

## 2023-10-09 PROCEDURE — 64493 INJ PARAVERT F JNT L/S 1 LEV: CPT | Mod: 50,KX,, | Performed by: ANESTHESIOLOGY

## 2023-10-09 PROCEDURE — 64494 PR INJ DX/THER AGNT PARAVERT FACET JOINT,IMG GUIDE,LUMBAR/SAC, 2ND LEVEL: ICD-10-PCS | Mod: 50,KX,, | Performed by: ANESTHESIOLOGY

## 2023-10-09 PROCEDURE — 64494 INJ PARAVERT F JNT L/S 2 LEV: CPT | Mod: 50,KX,, | Performed by: ANESTHESIOLOGY

## 2023-10-09 RX ORDER — LIDOCAINE HYDROCHLORIDE 20 MG/ML
INJECTION, SOLUTION INFILTRATION; PERINEURAL
Status: DISCONTINUED | OUTPATIENT
Start: 2023-10-09 | End: 2023-10-09 | Stop reason: HOSPADM

## 2023-10-09 RX ORDER — ALPRAZOLAM 0.5 MG/1
0.5 TABLET ORAL ONCE
Status: COMPLETED | OUTPATIENT
Start: 2023-10-09 | End: 2023-10-09

## 2023-10-09 RX ORDER — BUPIVACAINE HYDROCHLORIDE 2.5 MG/ML
INJECTION, SOLUTION EPIDURAL; INFILTRATION; INTRACAUDAL
Status: DISCONTINUED | OUTPATIENT
Start: 2023-10-09 | End: 2023-10-09 | Stop reason: HOSPADM

## 2023-10-09 RX ORDER — SODIUM CHLORIDE 9 MG/ML
INJECTION, SOLUTION INTRAVENOUS CONTINUOUS
Status: DISCONTINUED | OUTPATIENT
Start: 2023-10-09 | End: 2023-10-09 | Stop reason: HOSPADM

## 2023-10-09 RX ADMIN — ALPRAZOLAM 0.5 MG: 0.5 TABLET ORAL at 02:10

## 2023-10-09 NOTE — OP NOTE
Diagnostic Lumbar Medial Branch Block Under Fluoroscopy    The procedure, risks, benefits, and options were discussed with the patient. There are no contraindications to the procedure. The patent expressed understanding and agreed to the procedure. Informed written consent was obtained prior to the start of the procedure and can be found in the patient's chart.    PATIENT NAME: Mrs. Regino Lawrence   MRN: [id number]     DATE OF PROCEDURE: 10/09/2023                                           PROCEDURE:  Diagnostic Bilateral L3, L4, and L5 Lumbar Medial Branch Block under Fluoroscopy    PRE-OP DIAGNOSIS: Lumbar spondylosis [M47.816] Lumbar spondylosis [M47.816]    POST-OP DIAGNOSIS: Same    PHYSICIAN: Alfonso Richards MD    ASSISTANTS: SUSAN Kelly MD  [employer] PM&R,, pain fellow     MEDICATIONS INJECTED:  Bupivicaine 0.25%    LOCAL ANESTHETIC INJECTED:   Xylocaine 2%    SEDATION: None    ESTIMATED BLOOD LOSS:  None    COMPLICATIONS:  None.    INTERVAL HISTORY: Patient has clinical and imaging findings suggestive of facet mediated pain.    TECHNIQUE: Time-out was performed to identify the patient and procedure to be performed. With the patient laying in a prone position, the surgical area was prepped and draped in the usual sterile fashion using ChloraPrep and fenestrated drape. The levels were determined under fluoroscopic guidance. Skin anesthesia was achieved by injecting Lidocaine 2% over the injection sites. A 22 gauge, 3.5 inch needle was introduced into the medial branch nerves at the junctions of the superior articular process and the transverse processes of the targeted sites using AP, lateral and/or contralateral oblique fluoroscopic imaging. After negative aspiration for blood or CSF was confirmed, 1 mL of the anesthetic listed above was then slowly injected at each site. The needles were removed and bleeding was nil. A sterile dressing was applied. No specimens collected. The patient tolerated the procedure  well.    PRE-PROCEDURE PAIN SCORE: 10/10    POST-PROCEDURE PAIN SCORE: 8/10    The patient was monitored after the procedure in the recovery area. They were given post-procedure and discharge instructions to follow at home. The patient was discharged in a stable condition.        Alfonso Richards MD

## 2023-10-09 NOTE — H&P
HPI  Patient presenting for Procedure(s) (LRB):  BLOCK, NERVE, BILATERAL L3-L4-L5 MEDIAL BRANCH (Bilateral)     Patient on Anti-coagulation  reviewed    No health changes since previous encounter    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, without long-term current use of insulin     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, without long-term current use of insulin     Depression     Diabetes mellitus, type 2     Eye injury as a child     k-abrasion  od    Hyperlipidemia     Hypertension     Hypothyroidism     Immune deficiency disorder     Immune disorder     LOC (loss of consciousness) 2021    at home    Myalgia and myositis 2012    Osteoporosis     Polyneuropathy     Pulmonary embolism 7/10/2021    Renal manifestation of secondary diabetes mellitus     Sarcoidosis     Seizure     Type 2 diabetes mellitus     Ulcer     no cancer    Urinary incontinence      Past Surgical History:   Procedure Laterality Date    CARPAL TUNNEL RELEASE      Rt wrist    CATARACT EXTRACTION W/  INTRAOCULAR LENS IMPLANT Right 2015    Dr. Azevedo    CATARACT EXTRACTION W/  INTRAOCULAR LENS IMPLANT Left 2015    Dr. Azevedo    CERVICAL SPINE SURGERY       SECTION      CHOLECYSTECTOMY      INJECTION OF ANESTHETIC AGENT AROUND NERVE Left 2020    Procedure: BLOCK, NERVE LEFT FEMORAL AND OBTURATOR;  Surgeon: Alfonso Richards MD;  Location: [facility];  Service: Pain Management;  Laterality: Left;  NEEDS CONSENT    INJECTION OF JOINT Left 3/21/2019    Procedure: Injection, Joint  fLUOROSCOPIC jOINT iNJECTION (hIP iNJECTION) LEFT ROCH BURSA AS WELL LEFT TROCHANTERIC BURSA;  Surgeon:  Alfonso Richards MD;  Location: [facility];  Service: Pain Management;  Laterality: Left;  NEEDS CONSENT, DIABETIC    INJECTION OF JOINT Left 7/22/2019    Procedure: Injection, Joint FLUOROSCOPIC JOINT INJECTION (HIP INJECTION) LEFT HIP;  Surgeon: Alfonso Richards MD;  Location: [facility];  Service: Pain Management;  Laterality: Left;  NEEDS CONSENT    INJECTION OF JOINT Left 9/12/2019    Procedure: INJECTION, JOINT;  Surgeon: Alfonso Richards MD;  Location: [facility];  Service: Pain Management;  Laterality: Left;  Left Hip and Left GTB Injections    INJECTION OF JOINT Left 7/27/2020    Procedure: INJECTION, JOINT, LEFT HIP and LEFT GREATER TROCHANTERIC BURSA;  Surgeon: Alfonso Richards MD;  Location: [facility];  Service: Pain Management;  Laterality: Left;  INJECTION, JOINT, LEFT HIP and LEFT GREATER TROCHANTERIC BURSA    INJECTION OF JOINT Left 9/3/2020    Procedure: INJECTION, JOINT, LEFT SI;  Surgeon: Alfonso Richards MD;  Location: [facility];  Service: Pain Management;  Laterality: Left;  INJECTION, JOINT, LEFT SI    TRANSFORAMINAL EPIDURAL INJECTION OF STEROID Bilateral 12/5/2019    Procedure: INJECTION, STEROID, EPIDURAL, TRANSFORAMINAL APPROACH;  Surgeon: Alfonso Richards MD;  Location: [facility];  Service: Pain Management;  Laterality: Bilateral;  B/L TF RHIANNA L5  Consent Needed    TRANSFORAMINAL EPIDURAL INJECTION OF STEROID Bilateral 6/29/2020    Procedure: INJECTION, STEROID, EPIDURAL, TRANSFORAMINAL APPROACH L5/S1;  Surgeon: Alfonso Richards MD;  Location: [facility];  Service: Pain Management;  Laterality: Bilateral;  B/L TF RHIANNA L5/S1    TUBAL LIGATION       Review of patient's allergies indicates:   Allergen Reactions    Azathioprine Shortness Of Breath and Other (See Comments)     Fatigue      Current Facility-Administered Medications   Medication    0.9%  NaCl infusion       PMHx, PSHx, Allergies, Medications reviewed in epic    ROS negative except pain complaints in HPI    OBJECTIVE:    BP (!) 155/73  "(BP Location: Right arm, Patient Position: Lying)   Pulse 96   Temp 97.1 °F (36.2 °C) (Oral)   Resp 16   Ht 5' 2" (1.575 m)   Wt 57.2 kg (126 lb)   LMP  (LMP Unknown)   SpO2 99%   Breastfeeding No   BMI 23.05 kg/m²     PHYSICAL EXAMINATION:    GENERAL: Well appearing, in no acute distress, alert and oriented x3.  PSYCH:  Mood and affect appropriate.  SKIN: Skin color, texture, turgor normal, no rashes or lesions which will impact the procedure.  CV: RRR with palpation of the radial artery.  PULM: No evidence of respiratory difficulty, symmetric chest rise. Clear to auscultation.  NEURO: Cranial nerves grossly intact.    Plan:    Proceed with procedure as planned Procedure(s) (LRB):  BLOCK, NERVE, BILATERAL L3-L4-L5 MEDIAL BRANCH (Bilateral)    Alfonso Richards  10/09/2023            "

## 2023-10-09 NOTE — DISCHARGE SUMMARY
Discharge Note  Short Stay      SUMMARY     Admit Date: 10/9/2023    Attending Physician: Alfonso Richards      Discharge Physician: Alfonso Richards      Discharge Date: 10/9/2023 2:35 PM    Procedure(s) (LRB):  BLOCK, NERVE, BILATERAL L3-L4-L5 MEDIAL BRANCH (Bilateral)    Final Diagnosis: Lumbar spondylosis [M47.816]    Disposition: Home or self care    Patient Instructions:   Current Discharge Medication List        CONTINUE these medications which have NOT CHANGED    Details   ACCU-CHEK FASTCLIX LANCING DEV Kit USE AS DIRECTED.  Qty: 1 each, Refills: 0    Associated Diagnoses: Controlled diabetes mellitus type 2 with complications      albuterol-ipratropium (DUO-NEB) 2.5 mg-0.5 mg/3 mL nebulizer solution Take 3 mLs by nebulization every 4 (four) hours as needed for Wheezing or Shortness of Breath. Rescue  Qty: 90 each, Refills: 11    Associated Diagnoses: Sarcoidosis      ALCOHOL ANTISEPTIC PADS (ALCOHOL PREP PADS TOP)       apixaban (ELIQUIS) 5 mg Tab Take 1 tablet (5 mg total) by mouth 2 (two) times daily. Start this prescription after finishing starter pack  Qty: 60 tablet, Refills: 5    Associated Diagnoses: Pulmonary embolism, unspecified chronicity, unspecified pulmonary embolism type, unspecified whether acute cor pulmonale present      atorvastatin (LIPITOR) 20 MG tablet Take 1 tablet (20 mg total) by mouth once daily.  Qty: 90 tablet, Refills: 1    Associated Diagnoses: Combined 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      !! blood sugar diagnostic (ACCU-CHEK SMARTVIEW TEST STRIP) Strp Use as directed to check blood sugar twice daily.  Qty: 200 strip, Refills: 3    Associated Diagnoses: Controlled type 2 diabetes mellitus with complication, without long-term current use of insulin      !! blood sugar diagnostic Strp To check BG three times daily, to use with insurance preferred meter  Qty: 300 each, Refills: 3    Associated Diagnoses: Diabetes mellitus with stage 3 chronic kidney disease      !!  blood-glucose meter kit Use as instructed  Qty: 1 each, Refills: 0    Comments: AccuCheck  Associated Diagnoses: Controlled type 2 diabetes mellitus without complication, without long-term current use of insulin      !! blood-glucose meter kit To check BG three times daily, to use with insurance preferred meter  Qty: 1 each, Refills: 0    Associated Diagnoses: Diabetes mellitus with stage 3 chronic kidney disease      brivaracetam (BRIVIACT) 75 mg Tab Take 1 tablet (75 mg total) by mouth 2 (two) times daily.  Qty: 60 tablet, Refills: 2    Associated Diagnoses: Seizure      calcium carbonate (OS-MALCOLM) 600 mg calcium (1,500 mg) Tab Take 1 tablet by mouth 2 (two) times daily.       carvediloL (COREG) 25 MG tablet Take 1 tablet (25 mg total) by mouth 2 (two) times daily.  Qty: 180 tablet, Refills: 3    Comments: .  Associated Diagnoses: Essential hypertension      cetirizine (ZYRTEC) 10 MG tablet Take 1 tablet (10 mg total) by mouth once daily.  Qty: 30 tablet, Refills: 0    Associated Diagnoses: COVID-19 virus infection      diclofenac sodium (VOLTAREN) 1 % Gel Apply 2 g topically once daily.  Qty: 100 g, Refills: 5    Associated Diagnoses: Sacroiliitis      DULoxetine (CYMBALTA) 30 MG capsule Take 1 capsule (30 mg total) by mouth once daily.  Qty: 90 capsule, Refills: 3    Associated Diagnoses: Sarcoidosis; Chronic bilateral low back pain with left-sided sciatica      epoetin german-epbx (RETACRIT) 10,000 unit/mL imjection Inject 20,000 Units into the skin every 14 (fourteen) days.      fenofibrate 160 MG Tab Take 1 tablet (160 mg total) by mouth once daily.  Qty: 90 tablet, Refills: 1    Associated Diagnoses: Combined 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      fluticasone propion-salmeterol 115-21 mcg/dose (ADVAIR HFA) 115-21 mcg/actuation HFAA inhaler Inhale 2 puffs into the lungs every 12 (twelve) hours. Controller  Qty: 12 g, Refills: 3    Associated Diagnoses: Shortness of breath; Sarcoidosis     "  folic acid (FOLVITE) 1 MG tablet Take 1 tablet (1,000 mcg total) by mouth once daily.  Qty: 90 tablet, Refills: 1    Associated Diagnoses: Sarcoidosis; Myopathy      hydrALAZINE (APRESOLINE) 25 MG tablet Take 2 tabs every 8 hours by mouth. Check BP 2 hours after each dose and if Blood pressure >160- please take 1 extra tab  Qty: 120 tablet, Refills: 1    Comments: .  Associated Diagnoses: Hypertension, uncontrolled      KLGA ketoprofen 10% LIDOcaine 10% gabapentin 6% amitriptyline 2% in transdermal cream Apply 1 to 2 grams 3 to 4 times daily  Qty: 90 g, Refills: 11    Associated Diagnoses: Chronic pain syndrome      LEVEMIR FLEXPEN 100 unit/mL (3 mL) InPn pen INJECT 10 UNITS SUBCUTANEOUSLY IN THE EVENING  Qty: 15 mL, Refills: 0    Associated Diagnoses: Diabetes mellitus with stage 3 chronic kidney disease      levETIRAcetam (KEPPRA) 250 MG Tab Take 1 tablet (250 mg total) by mouth 2 (two) times daily.  Qty: 60 tablet, Refills: 5    Associated Diagnoses: Seizure      levothyroxine (SYNTHROID) 50 MCG tablet Take 1 tablet (50 mcg total) by mouth before breakfast.  Qty: 90 tablet, Refills: 3    Associated Diagnoses: Hypothyroidism, unspecified type      NIFEdipine (PROCARDIA-XL) 90 MG (OSM) 24 hr tablet Take 1 tablet (90 mg total) by mouth once daily.  Qty: 90 tablet, Refills: 1    Comments: .  Associated Diagnoses: Hypertension, uncontrolled      olopatadine (PATANOL) 0.1 % ophthalmic solution Place 1 drop into both eyes daily as needed for Allergies.  Qty: 5 mL, Refills: 1      pantoprazole (PROTONIX) 40 MG tablet Take 1 tablet (40 mg total) by mouth once daily.  Qty: 90 tablet, Refills: 1    Associated Diagnoses: Bloating      pen needle, diabetic (NOVOFINE 32) 32 gauge x 1/4" Ndle For use with levermir pen .  Qty: 100 each, Refills: 3    Associated Diagnoses: Diabetes mellitus with stage 3 chronic kidney disease      predniSONE (DELTASONE) 1 MG tablet 4 tabs po daily for 1 month then decrease by 1 tab every month " if labs allow  Qty: 360 tablet, Refills: 0    Associated Diagnoses: Sarcoidosis; Myopathy      tiZANidine (ZANAFLEX) 2 MG tablet Take 2 tablets (4 mg total) by mouth every 8 (eight) hours as needed (muscle spasm).  Qty: 270 tablet, Refills: 1    Associated Diagnoses: Myopathy; Myalgia       !! - Potential duplicate medications found. Please discuss with provider.              Discharge Diagnosis: Lumbar spondylosis [M47.816]  Condition on Discharge: Stable with no complications to procedure   Diet on Discharge: Same as before.  Activity: as per instruction sheet.  Discharge to: Home with a responsible adult.  Follow up: 2-4 weeks       Please call my office or pager at [phone] if experienced any weakness or loss of sensation, fever > 101.5, pain uncontrolled with oral medications, persistent nausea/vomiting/or diarrhea, redness or drainage from the incisions, or any other worrisome concerns. If physician on call was not reached or could not communicate with our office for any reason please go to the nearest emergency department

## 2023-10-09 NOTE — DISCHARGE INSTRUCTIONS

## 2023-10-10 ENCOUNTER — TELEPHONE (OUTPATIENT)
Dept: FAMILY MEDICINE | Facility: CLINIC | Age: 78
End: 2023-10-10
Payer: MEDICARE

## 2023-10-10 NOTE — TELEPHONE ENCOUNTER
----- Message from Tereza Lomeli sent at 10/10/2023  2:58 PM CDT -----  Regarding: Elaina/  Cary Coats/  [phone]  Type: Patient Call Back    Who called:  Patient    What is the request in detail:  Clarification is needed on patients medication for pain cream.  Pharmacy is needing a call back from staff.  Thank you     Would the patient rather a call back or a response via [facility]?  Call back    Best call back number:  [phone]          Thank you

## 2023-10-18 ENCOUNTER — PATIENT MESSAGE (OUTPATIENT)
Dept: FAMILY MEDICINE | Facility: CLINIC | Age: 78
End: 2023-10-18
Payer: MEDICARE

## 2023-10-18 DIAGNOSIS — R56.9 SEIZURE: ICD-10-CM

## 2023-10-18 RX ORDER — LEVETIRACETAM 250 MG/1
250 TABLET ORAL 2 TIMES DAILY
Qty: 60 TABLET | Refills: 5 | OUTPATIENT
Start: 2023-10-18 | End: 2024-10-17

## 2023-10-18 NOTE — TELEPHONE ENCOUNTER
Refill Routing Note   Medication(s) are not appropriate for processing by [facility] for the following reason(s):      Medication outside of protocol    [facility] action(s):  Route Care Due:  None identified              Appointments  past 12m or future 3m with PCP    Date Provider   Last Visit   8/16/2023 Micaela Mendoza MD   Next Visit   11/16/2023 Micaela Mendoza MD   ED visits in past 90 days: 0        Note composed:11:50 AM 10/18/2023

## 2023-10-19 DIAGNOSIS — R56.9 SEIZURE: ICD-10-CM

## 2023-10-19 RX ORDER — LEVETIRACETAM 250 MG/1
250 TABLET ORAL 2 TIMES DAILY
Qty: 60 TABLET | Refills: 5 | Status: SHIPPED | OUTPATIENT
Start: 2023-10-19 | End: 2023-12-06 | Stop reason: SDUPTHER

## 2023-10-19 NOTE — TELEPHONE ENCOUNTER
Called pt back , informed Dr. Murcia is no longer with [facility] . Asked if she takes two seizure  medications . She said no just Keppra

## 2023-10-19 NOTE — TELEPHONE ENCOUNTER
----- Message from Robinson Gonzalez sent at 10/18/2023  4:28 PM CDT -----  Who Called:CANDELARIA SANTANA [[id number]]           New Prescription or Refill : Refill      RX Name and Strength:  levETIRAcetam (KEPPRA) 250 MG Tab       30 day or 90 day RX:60           Local or Mail Order : Local   Mail Order            Preferred Pharmacy:[facility] [id number] [facility] [id number] [address]      Would the patient rather a call back or a response via [facility]? Yes        Best Call Back Number:  [phone]        Additional Information: None

## 2023-10-20 ENCOUNTER — INFUSION (OUTPATIENT)
Dept: INFUSION THERAPY | Facility: HOSPITAL | Age: 78
End: 2023-10-20
Attending: INTERNAL MEDICINE
Payer: MEDICARE

## 2023-10-20 VITALS
OXYGEN SATURATION: 99 % | HEART RATE: 87 BPM | SYSTOLIC BLOOD PRESSURE: 108 MMHG | TEMPERATURE: 99 F | RESPIRATION RATE: 18 BRPM | DIASTOLIC BLOOD PRESSURE: 84 MMHG

## 2023-10-20 DIAGNOSIS — Z53.1 REFUSAL OF BLOOD TRANSFUSIONS AS PATIENT IS JEHOVAH'S WITNESS: ICD-10-CM

## 2023-10-20 DIAGNOSIS — D63.1 ANEMIA IN STAGE 3 CHRONIC KIDNEY DISEASE: ICD-10-CM

## 2023-10-20 DIAGNOSIS — N18.30 ANEMIA IN STAGE 3 CHRONIC KIDNEY DISEASE: ICD-10-CM

## 2023-10-20 DIAGNOSIS — N18.9 ANEMIA IN CHRONIC KIDNEY DISEASE, UNSPECIFIED CKD STAGE: ICD-10-CM

## 2023-10-20 DIAGNOSIS — N18.30 ANEMIA IN STAGE 3 CHRONIC KIDNEY DISEASE, UNSPECIFIED WHETHER STAGE 3A OR 3B CKD: Primary | ICD-10-CM

## 2023-10-20 DIAGNOSIS — D63.1 ANEMIA IN STAGE 3 CHRONIC KIDNEY DISEASE, UNSPECIFIED WHETHER STAGE 3A OR 3B CKD: Primary | ICD-10-CM

## 2023-10-20 DIAGNOSIS — D63.1 ANEMIA IN CHRONIC KIDNEY DISEASE, UNSPECIFIED CKD STAGE: ICD-10-CM

## 2023-10-20 DIAGNOSIS — D50.9 IRON DEFICIENCY ANEMIA, UNSPECIFIED IRON DEFICIENCY ANEMIA TYPE: ICD-10-CM

## 2023-10-20 PROCEDURE — 63600175 PHARM REV CODE 636 W HCPCS: Mod: JZ,EC,JG | Performed by: INTERNAL MEDICINE

## 2023-10-20 PROCEDURE — 96372 THER/PROPH/DIAG INJ SC/IM: CPT

## 2023-10-20 RX ADMIN — EPOETIN ALFA-EPBX 40000 UNITS: 40000 INJECTION, SOLUTION INTRAVENOUS; SUBCUTANEOUS at 11:10

## 2023-10-21 DIAGNOSIS — E78.2 COMBINED HYPERLIPIDEMIA ASSOCIATED WITH TYPE 2 DIABETES MELLITUS: Chronic | ICD-10-CM

## 2023-10-21 DIAGNOSIS — E11.69 COMBINED HYPERLIPIDEMIA ASSOCIATED WITH TYPE 2 DIABETES MELLITUS: Chronic | ICD-10-CM

## 2023-10-21 DIAGNOSIS — G72.9 MYOPATHY: ICD-10-CM

## 2023-10-21 DIAGNOSIS — M79.10 MYALGIA: ICD-10-CM

## 2023-10-21 DIAGNOSIS — D86.9 SARCOIDOSIS: Chronic | ICD-10-CM

## 2023-10-22 NOTE — TELEPHONE ENCOUNTER
Care Due:                  Date            Visit Type   Department     Provider  --------------------------------------------------------------------------------                                [facility] FAM[facility]                              PRIMARY      MEDICINE/  Last Visit: 08-      CARE ([facility])   BENEDICT Mendoza                              [facility]                              PRIMARY      MEDICINE/  Next Visit: 11-      CARE ([facility])   BENEDICT Sanz  Test          Frequency    Reason                     Performed    Due Date  --------------------------------------------------------------------------------    Lipid Panel.  12 months..  atorvastatin, fenofibrate  12- 12-    [facility] Embedded Care Due Messages. Reference number: [id number].   10/21/2023 8:44:02 PM CDT

## 2023-10-23 RX ORDER — FOLIC ACID 1 MG/1
1000 TABLET ORAL DAILY
Qty: 90 TABLET | Refills: 1 | Status: SHIPPED | OUTPATIENT
Start: 2023-10-23 | End: 2024-01-16 | Stop reason: SDUPTHER

## 2023-10-23 RX ORDER — FENOFIBRATE 160 MG/1
160 TABLET ORAL DAILY
Qty: 90 TABLET | Refills: 1 | Status: SHIPPED | OUTPATIENT
Start: 2023-10-23

## 2023-10-23 RX ORDER — ATORVASTATIN CALCIUM 20 MG/1
20 TABLET, FILM COATED ORAL DAILY
Qty: 90 TABLET | Refills: 0 | Status: SHIPPED | OUTPATIENT
Start: 2023-10-23 | End: 2024-01-16 | Stop reason: SDUPTHER

## 2023-10-23 NOTE — TELEPHONE ENCOUNTER
Refill Routing Note   Medication(s) are not appropriate for processing by [facility] for the following reason(s):      Medication outside of protocol: folvite  Drug-disease interaction: tricor & CKD    [facility] action(s):  Defer  Route  Approve Care Due:  Labs due: lipid panel due 12/4/23        Pharmacist review requested: Yes   Extended chart review required: Yes     Appointments  past 12m or future 3m with PCP    Date Provider   Last Visit   8/16/2023 Micaela Mendoza MD   Next Visit   11/16/2023 Micaela Mendoza MD   ED visits in past 90 days: 0        Note composed:3:17 PM 10/23/2023

## 2023-10-23 NOTE — TELEPHONE ENCOUNTER
Refill Routing Note   Medication(s) are not appropriate for processing by [facility] for the following reason(s):      Drug-disease interaction    [facility] action(s):  Defer  Route Care Due:  Labs due     Medication Therapy Plan: (4) Drug-Disease: fenofibrate and Anemia in stage 3 chronic kidney disease; Stage 3a chronic kidney disease; Anemia in stage 3 chronic kidney disease, unspecified whether stage 3a or 3b CKD; DDI: Fenofibrate & atorvastatin with Myopathy and muscle weakness    Pharmacist review requested: Yes     Appointments  past 12m or future 3m with PCP    Date Provider   Last Visit   8/16/2023 Micaela Mendoza MD   Next Visit   11/16/2023 Micaela Mendoza MD   ED visits in past 90 days: 0        Note composed:2:28 PM 10/23/2023

## 2023-10-24 RX ORDER — TIZANIDINE 2 MG/1
4 TABLET ORAL EVERY 8 HOURS PRN
Qty: 270 TABLET | Refills: 1 | Status: SHIPPED | OUTPATIENT
Start: 2023-10-24 | End: 2023-10-26 | Stop reason: SDUPTHER

## 2023-10-25 ENCOUNTER — OFFICE VISIT (OUTPATIENT)
Dept: OPHTHALMOLOGY | Facility: CLINIC | Age: 78
End: 2023-10-25
Attending: OPHTHALMOLOGY
Payer: MEDICARE

## 2023-10-25 DIAGNOSIS — H17.9 CORNEAL SCAR, RIGHT EYE: ICD-10-CM

## 2023-10-25 DIAGNOSIS — E11.9 DM TYPE 2 WITHOUT RETINOPATHY: ICD-10-CM

## 2023-10-25 DIAGNOSIS — H04.123 DRY EYE SYNDROME, BILATERAL: ICD-10-CM

## 2023-10-25 DIAGNOSIS — H10.13 ALLERGIC CONJUNCTIVITIS OF BOTH EYES: ICD-10-CM

## 2023-10-25 DIAGNOSIS — H34.8120 HEMISPHERIC RETINAL VEIN OCCLUSION WITH MACULAR EDEMA OF LEFT EYE: Primary | ICD-10-CM

## 2023-10-25 PROCEDURE — 3288F FALL RISK ASSESSMENT DOCD: CPT | Mod: CPTII,S$GLB,, | Performed by: OPHTHALMOLOGY

## 2023-10-25 PROCEDURE — 1157F ADVNC CARE PLAN IN RCRD: CPT | Mod: CPTII,S$GLB,, | Performed by: OPHTHALMOLOGY

## 2023-10-25 PROCEDURE — 92134 POSTERIOR SEGMENT OCT RETINA (OCULAR COHERENCE TOMOGRAPHY)-BOTH EYES: ICD-10-PCS | Mod: S$GLB,,, | Performed by: OPHTHALMOLOGY

## 2023-10-25 PROCEDURE — 2023F DILAT RTA XM W/O RTNOPTHY: CPT | Mod: CPTII,S$GLB,, | Performed by: OPHTHALMOLOGY

## 2023-10-25 PROCEDURE — 1160F RVW MEDS BY RX/DR IN RCRD: CPT | Mod: CPTII,S$GLB,, | Performed by: OPHTHALMOLOGY

## 2023-10-25 PROCEDURE — 1101F PR PT FALLS ASSESS DOC 0-1 FALLS W/OUT INJ PAST YR: ICD-10-PCS | Mod: CPTII,S$GLB,, | Performed by: OPHTHALMOLOGY

## 2023-10-25 PROCEDURE — 1157F PR ADVANCE CARE PLAN OR EQUIV PRESENT IN MEDICAL RECORD: ICD-10-PCS | Mod: CPTII,S$GLB,, | Performed by: OPHTHALMOLOGY

## 2023-10-25 PROCEDURE — 2023F PR DILATED RETINAL EXAM W/O EVID OF RETINOPATHY: ICD-10-PCS | Mod: CPTII,S$GLB,, | Performed by: OPHTHALMOLOGY

## 2023-10-25 PROCEDURE — 1159F PR MEDICATION LIST DOCUMENTED IN MEDICAL RECORD: ICD-10-PCS | Mod: CPTII,S$GLB,, | Performed by: OPHTHALMOLOGY

## 2023-10-25 PROCEDURE — 99999 PR PBB SHADOW E&M-EST. PATIENT-LVL IV: CPT | Mod: PBBFAC,,, | Performed by: OPHTHALMOLOGY

## 2023-10-25 PROCEDURE — 99999 PR PBB SHADOW E&M-EST. PATIENT-LVL IV: ICD-10-PCS | Mod: PBBFAC,,, | Performed by: OPHTHALMOLOGY

## 2023-10-25 PROCEDURE — 3288F PR FALLS RISK ASSESSMENT DOCUMENTED: ICD-10-PCS | Mod: CPTII,S$GLB,, | Performed by: OPHTHALMOLOGY

## 2023-10-25 PROCEDURE — 1160F PR REVIEW ALL MEDS BY PRESCRIBER/CLIN PHARMACIST DOCUMENTED: ICD-10-PCS | Mod: CPTII,S$GLB,, | Performed by: OPHTHALMOLOGY

## 2023-10-25 PROCEDURE — 1159F MED LIST DOCD IN RCRD: CPT | Mod: CPTII,S$GLB,, | Performed by: OPHTHALMOLOGY

## 2023-10-25 PROCEDURE — 92014 PR EYE EXAM, EST PATIENT,COMPREHESV: ICD-10-PCS | Mod: S$GLB,,, | Performed by: OPHTHALMOLOGY

## 2023-10-25 PROCEDURE — 92134 CPTRZ OPH DX IMG PST SGM RTA: CPT | Mod: S$GLB,,, | Performed by: OPHTHALMOLOGY

## 2023-10-25 PROCEDURE — 1126F PR PAIN SEVERITY QUANTIFIED, NO PAIN PRESENT: ICD-10-PCS | Mod: CPTII,S$GLB,, | Performed by: OPHTHALMOLOGY

## 2023-10-25 PROCEDURE — 1101F PT FALLS ASSESS-DOCD LE1/YR: CPT | Mod: CPTII,S$GLB,, | Performed by: OPHTHALMOLOGY

## 2023-10-25 PROCEDURE — 92014 COMPRE OPH EXAM EST PT 1/>: CPT | Mod: S$GLB,,, | Performed by: OPHTHALMOLOGY

## 2023-10-25 PROCEDURE — 1126F AMNT PAIN NOTED NONE PRSNT: CPT | Mod: CPTII,S$GLB,, | Performed by: OPHTHALMOLOGY

## 2023-10-25 NOTE — PROGRESS NOTES
"   Subjective:       Patient ID: Regino Lawrence is a 77 y.o. female      Chief Complaint   Patient presents with    Referral     Referred by Dr Iglesias for reeval of RVO     History of Present Illness  HPI     Referral     Additional comments: Referred by Dr Iglesias for reeval of RVO           Comments    Pt states she just recently had glasses made and she thinks they have   helped improve her sight. She states sometimes her eye's will 'throb" but   it is usually resolved with pataday.   Happy with current vision    -Flashes   -Floaters   -pain     Eye meds:   Pataday Ou Prn           Last edited by Hemanth Baker MD on 10/25/2023 11:20 AM.        Imaging:    See report    Assessment/Plan:     1. Hemispheric retinal vein occlusion with macular edema of left eye  Resolved  Last inj Av 6/2020  CV risk factor control  Observe    - Posterior Segment OCT Retina-Both eyes  - Posterior Segment OCT Retina-Both eyes; Future    2. Dry eye syndrome, bilateral  Cont ATs    3. Corneal scar, right eye  observe    4. DM type 2 without retinopathy  BS control    5. Allergic conjunctivitis of both eyes  Symptoms controlled with Pataday  Cont Pataday 1/1    Can do yearly comprehensive f/u only and retina PRN.  Retina pathology stable for years    Follow up if symptoms worsen or fail to improve.     "

## 2023-10-26 DIAGNOSIS — M79.10 MYALGIA: ICD-10-CM

## 2023-10-26 DIAGNOSIS — G72.9 MYOPATHY: ICD-10-CM

## 2023-10-26 RX ORDER — TIZANIDINE 2 MG/1
4 TABLET ORAL EVERY 8 HOURS PRN
Qty: 270 TABLET | Refills: 1 | Status: SHIPPED | OUTPATIENT
Start: 2023-10-26

## 2023-11-02 ENCOUNTER — PATIENT MESSAGE (OUTPATIENT)
Dept: PAIN MEDICINE | Facility: CLINIC | Age: 78
End: 2023-11-02
Payer: MEDICARE

## 2023-11-03 ENCOUNTER — INFUSION (OUTPATIENT)
Dept: INFUSION THERAPY | Facility: HOSPITAL | Age: 78
End: 2023-11-03
Attending: INTERNAL MEDICINE
Payer: MEDICARE

## 2023-11-03 VITALS
DIASTOLIC BLOOD PRESSURE: 80 MMHG | HEART RATE: 84 BPM | RESPIRATION RATE: 18 BRPM | TEMPERATURE: 98 F | SYSTOLIC BLOOD PRESSURE: 125 MMHG | OXYGEN SATURATION: 97 %

## 2023-11-03 DIAGNOSIS — D63.1 ANEMIA IN STAGE 3 CHRONIC KIDNEY DISEASE, UNSPECIFIED WHETHER STAGE 3A OR 3B CKD: Primary | ICD-10-CM

## 2023-11-03 DIAGNOSIS — D50.9 IRON DEFICIENCY ANEMIA, UNSPECIFIED IRON DEFICIENCY ANEMIA TYPE: ICD-10-CM

## 2023-11-03 DIAGNOSIS — D63.1 ANEMIA IN STAGE 3 CHRONIC KIDNEY DISEASE: ICD-10-CM

## 2023-11-03 DIAGNOSIS — D63.1 ANEMIA IN CHRONIC KIDNEY DISEASE, UNSPECIFIED CKD STAGE: ICD-10-CM

## 2023-11-03 DIAGNOSIS — N18.30 ANEMIA IN STAGE 3 CHRONIC KIDNEY DISEASE, UNSPECIFIED WHETHER STAGE 3A OR 3B CKD: Primary | ICD-10-CM

## 2023-11-03 DIAGNOSIS — Z53.1 REFUSAL OF BLOOD TRANSFUSIONS AS PATIENT IS JEHOVAH'S WITNESS: ICD-10-CM

## 2023-11-03 DIAGNOSIS — N18.9 ANEMIA IN CHRONIC KIDNEY DISEASE, UNSPECIFIED CKD STAGE: ICD-10-CM

## 2023-11-03 DIAGNOSIS — N18.30 ANEMIA IN STAGE 3 CHRONIC KIDNEY DISEASE: ICD-10-CM

## 2023-11-03 PROCEDURE — 96372 THER/PROPH/DIAG INJ SC/IM: CPT

## 2023-11-03 PROCEDURE — 63600175 PHARM REV CODE 636 W HCPCS: Mod: JZ,EC,JG | Performed by: INTERNAL MEDICINE

## 2023-11-03 RX ADMIN — EPOETIN ALFA-EPBX 40000 UNITS: 40000 INJECTION, SOLUTION INTRAVENOUS; SUBCUTANEOUS at 11:11

## 2023-11-03 NOTE — PLAN OF CARE
Patient presents to unit c/o arthritic pain brought on by weather changes. Here for EPO administration. HgB 10.2. Tolerated EPO injection to LA w/o any acute adverse reactions. Patient has [facility] w/ next appointment reminders. Patient left unit in personal motorized scooter, in NAD.    Problem: Anemia  Goal: Anemia Symptom Improvement  Outcome: Met     Problem: Pain Chronic (Persistent)  Goal: Acceptable Pain Control and Functional Ability  Outcome: Met     Problem: Fall Injury Risk  Goal: Absence of Fall and Fall-Related Injury  Outcome: Met

## 2023-11-17 ENCOUNTER — INFUSION (OUTPATIENT)
Dept: INFUSION THERAPY | Facility: HOSPITAL | Age: 78
End: 2023-11-17
Attending: INTERNAL MEDICINE
Payer: MEDICARE

## 2023-11-17 VITALS
TEMPERATURE: 98 F | OXYGEN SATURATION: 96 % | DIASTOLIC BLOOD PRESSURE: 73 MMHG | HEART RATE: 89 BPM | SYSTOLIC BLOOD PRESSURE: 152 MMHG | RESPIRATION RATE: 18 BRPM

## 2023-11-17 DIAGNOSIS — D63.1 ANEMIA IN STAGE 3 CHRONIC KIDNEY DISEASE: Primary | ICD-10-CM

## 2023-11-17 DIAGNOSIS — N18.30 ANEMIA IN STAGE 3 CHRONIC KIDNEY DISEASE: Primary | ICD-10-CM

## 2023-11-17 DIAGNOSIS — D63.1 ANEMIA IN STAGE 3 CHRONIC KIDNEY DISEASE, UNSPECIFIED WHETHER STAGE 3A OR 3B CKD: ICD-10-CM

## 2023-11-17 DIAGNOSIS — D63.1 ANEMIA IN CHRONIC KIDNEY DISEASE, UNSPECIFIED CKD STAGE: ICD-10-CM

## 2023-11-17 DIAGNOSIS — N18.30 ANEMIA IN STAGE 3 CHRONIC KIDNEY DISEASE, UNSPECIFIED WHETHER STAGE 3A OR 3B CKD: ICD-10-CM

## 2023-11-17 DIAGNOSIS — Z53.1 REFUSAL OF BLOOD TRANSFUSIONS AS PATIENT IS JEHOVAH'S WITNESS: ICD-10-CM

## 2023-11-17 DIAGNOSIS — N18.9 ANEMIA IN CHRONIC KIDNEY DISEASE, UNSPECIFIED CKD STAGE: ICD-10-CM

## 2023-11-17 DIAGNOSIS — D50.9 IRON DEFICIENCY ANEMIA, UNSPECIFIED IRON DEFICIENCY ANEMIA TYPE: ICD-10-CM

## 2023-11-17 PROCEDURE — 63600175 PHARM REV CODE 636 W HCPCS: Mod: JZ,EC,JG | Performed by: INTERNAL MEDICINE

## 2023-11-17 PROCEDURE — 96372 THER/PROPH/DIAG INJ SC/IM: CPT

## 2023-11-17 RX ADMIN — EPOETIN ALFA-EPBX 40000 UNITS: 40000 INJECTION, SOLUTION INTRAVENOUS; SUBCUTANEOUS at 01:11

## 2023-11-17 NOTE — PLAN OF CARE
Patient presented to unit for Retacrit 40k units. VSS. Hgb resulted in 10.7. Parameters met for injection today. (Hold if Hgb 11).  Injection administered SC on left arm. Injection tolerated well. AVS and next appts reviewed. Handout provided. Patient discharged via motorized scooter in NAD.

## 2023-11-21 ENCOUNTER — PATIENT MESSAGE (OUTPATIENT)
Dept: FAMILY MEDICINE | Facility: CLINIC | Age: 78
End: 2023-11-21
Payer: MEDICARE

## 2023-11-22 ENCOUNTER — PATIENT MESSAGE (OUTPATIENT)
Dept: RHEUMATOLOGY | Facility: CLINIC | Age: 78
End: 2023-11-22
Payer: MEDICARE

## 2023-11-23 DIAGNOSIS — D86.9 SARCOIDOSIS: ICD-10-CM

## 2023-11-23 DIAGNOSIS — G72.9 MYOPATHY: ICD-10-CM

## 2023-11-23 RX ORDER — PREDNISONE 5 MG/1
TABLET ORAL
Qty: 15 TABLET | Refills: 0 | Status: SHIPPED | OUTPATIENT
Start: 2023-11-23 | End: 2024-01-22

## 2023-12-01 ENCOUNTER — PATIENT MESSAGE (OUTPATIENT)
Dept: RHEUMATOLOGY | Facility: CLINIC | Age: 78
End: 2023-12-01
Payer: MEDICARE

## 2023-12-04 ENCOUNTER — OFFICE VISIT (OUTPATIENT)
Dept: HEMATOLOGY/ONCOLOGY | Facility: CLINIC | Age: 78
End: 2023-12-04
Payer: MEDICARE

## 2023-12-04 VITALS
HEIGHT: 62 IN | OXYGEN SATURATION: 96 % | DIASTOLIC BLOOD PRESSURE: 80 MMHG | BODY MASS INDEX: 25.03 KG/M2 | SYSTOLIC BLOOD PRESSURE: 160 MMHG | HEART RATE: 93 BPM | WEIGHT: 136 LBS

## 2023-12-04 DIAGNOSIS — D63.1 ANEMIA IN CHRONIC KIDNEY DISEASE, UNSPECIFIED CKD STAGE: Primary | ICD-10-CM

## 2023-12-04 DIAGNOSIS — N18.9 CHRONIC KIDNEY DISEASE, UNSPECIFIED CKD STAGE: ICD-10-CM

## 2023-12-04 DIAGNOSIS — D86.9 SARCOIDOSIS: ICD-10-CM

## 2023-12-04 DIAGNOSIS — N18.9 ANEMIA IN CHRONIC KIDNEY DISEASE, UNSPECIFIED CKD STAGE: Primary | ICD-10-CM

## 2023-12-04 DIAGNOSIS — Z53.1 REFUSAL OF BLOOD TRANSFUSIONS AS PATIENT IS JEHOVAH'S WITNESS: ICD-10-CM

## 2023-12-04 PROCEDURE — 1101F PR PT FALLS ASSESS DOC 0-1 FALLS W/OUT INJ PAST YR: ICD-10-PCS | Mod: CPTII,S$GLB,, | Performed by: INTERNAL MEDICINE

## 2023-12-04 PROCEDURE — 1125F AMNT PAIN NOTED PAIN PRSNT: CPT | Mod: CPTII,S$GLB,, | Performed by: INTERNAL MEDICINE

## 2023-12-04 PROCEDURE — 1101F PT FALLS ASSESS-DOCD LE1/YR: CPT | Mod: CPTII,S$GLB,, | Performed by: INTERNAL MEDICINE

## 2023-12-04 PROCEDURE — 1157F PR ADVANCE CARE PLAN OR EQUIV PRESENT IN MEDICAL RECORD: ICD-10-PCS | Mod: CPTII,S$GLB,, | Performed by: INTERNAL MEDICINE

## 2023-12-04 PROCEDURE — 99214 PR OFFICE/OUTPT VISIT, EST, LEVL IV, 30-39 MIN: ICD-10-PCS | Mod: S$GLB,,, | Performed by: INTERNAL MEDICINE

## 2023-12-04 PROCEDURE — 3077F SYST BP >= 140 MM HG: CPT | Mod: CPTII,S$GLB,, | Performed by: INTERNAL MEDICINE

## 2023-12-04 PROCEDURE — 3077F PR MOST RECENT SYSTOLIC BLOOD PRESSURE >= 140 MM HG: ICD-10-PCS | Mod: CPTII,S$GLB,, | Performed by: INTERNAL MEDICINE

## 2023-12-04 PROCEDURE — 3079F DIAST BP 80-89 MM HG: CPT | Mod: CPTII,S$GLB,, | Performed by: INTERNAL MEDICINE

## 2023-12-04 PROCEDURE — 3079F PR MOST RECENT DIASTOLIC BLOOD PRESSURE 80-89 MM HG: ICD-10-PCS | Mod: CPTII,S$GLB,, | Performed by: INTERNAL MEDICINE

## 2023-12-04 PROCEDURE — 1125F PR PAIN SEVERITY QUANTIFIED, PAIN PRESENT: ICD-10-PCS | Mod: CPTII,S$GLB,, | Performed by: INTERNAL MEDICINE

## 2023-12-04 PROCEDURE — 3288F PR FALLS RISK ASSESSMENT DOCUMENTED: ICD-10-PCS | Mod: CPTII,S$GLB,, | Performed by: INTERNAL MEDICINE

## 2023-12-04 PROCEDURE — 3288F FALL RISK ASSESSMENT DOCD: CPT | Mod: CPTII,S$GLB,, | Performed by: INTERNAL MEDICINE

## 2023-12-04 PROCEDURE — 99999 PR PBB SHADOW E&M-EST. PATIENT-LVL III: ICD-10-PCS | Mod: PBBFAC,,, | Performed by: INTERNAL MEDICINE

## 2023-12-04 PROCEDURE — 99999 PR PBB SHADOW E&M-EST. PATIENT-LVL III: CPT | Mod: PBBFAC,,, | Performed by: INTERNAL MEDICINE

## 2023-12-04 PROCEDURE — 1157F ADVNC CARE PLAN IN RCRD: CPT | Mod: CPTII,S$GLB,, | Performed by: INTERNAL MEDICINE

## 2023-12-04 PROCEDURE — 99214 OFFICE O/P EST MOD 30 MIN: CPT | Mod: S$GLB,,, | Performed by: INTERNAL MEDICINE

## 2023-12-04 NOTE — PROGRESS NOTES
Subjective:        Patient ID: Regino Lawrence is a 78 y.o. female.    Chief Complaint: Follow-up anemia      Diagnosis: Anemia in CKD  Patient is a [employer]  .  Prior Hx;  The patient is seen today for f/u  chronic anemia in CKD undergoing EPO injections.The patient reports that she has been diagnosed with JOLLY in the past.  She has been on oral iron supplementation therapy, but could not tolerate or did not respond, she is uncertainShe also has  undergone intermittent IV iron therapy.  She is followed by GI and has undergone a colonoscopy earlier this year and was diagnosed with hemorrhoids for which she underwent banding procedure.  No melena, hematochezia,change in bowel habits.  She has also been diagnosed with B12 deficiency in the past, but reports she did not respond to B12 injections. No history of blood transfusions.  She is a [employer].  She reports that she remembers getting injections in the 1970s when her blood count was low.   She is followed by Rheumatology for history of sarcoidosis with associated myopathy and arthropathy. She has been treated in the  past with methotrexate and Plaquenil, both of which were ineffectiveCellcept and imuran caused some unknown side effect. She is followed by PCP for DMShe was admitted 6/2022 with worsening SOB     Interval Hx;   She continues with Chronic diffuse arthralgias   She is followed by pain mgmt  She underwent on 10/9/23 BLOCK, NERVE, BILATERAL L3-L4-L5 MEDIAL BRANCH   Pain not improved  She reports joint swelling and stiffness  She is f/b Rheumatology for sarcoidosis  She is in process of scheduling f/u with her treating Rheumatologist   In [facility]  She is undergoing epo inj q 2wks  Hb 11.5 g/dL on 12/1/23  No CP/cough  Chronic Mild fatigue  Mild FAUSTIN-stable  No melena, hematochezia or change in bowel habits  She also has  undergone intermittent IV iron therapy  She also has history of cervical spinal stenosis s/p posterior  "C3-C7 laminectomy and fusion on 11/16/15 by Dr. Conner.  UTD on flu and COVID immunizations       PAST MEDICAL HISTORY:  Acid reflux, alopecia, anemia, anxiety disorder, chronic  kidney disease, depression, diabetes mellitus type 2, hyperlipidemia,  hypertension, hypothyroidism, osteoporosis, sarcoidosis.    PAST SURGICAL HISTORY:  Cholecystectomy, , tubal ligation, carpal tunnel release, cataracts.    FAMILY HISTORY: Unremarkable for cancer. Significant for HTN.       Review of Systems   Constitutional:  Positive for fatigue (chronic, mild). Negative for activity change and appetite change.   HENT:  Negative for hearing loss and nosebleeds.    Eyes:  Negative for visual disturbance.   Respiratory:  Negative for cough and shortness of breath.    Cardiovascular:  Negative for chest pain and leg swelling.   Gastrointestinal:  Negative for abdominal pain, constipation, diarrhea and nausea.   Genitourinary:  Negative for flank pain and urgency.   Musculoskeletal:  Positive for arthralgias, back pain, joint swelling and myalgias. Negative for gait problem.   Skin:  Negative for rash.        No petechiae, ecchymoses   Neurological:  Negative for weakness, light-headedness and headaches.   Hematological:  Negative for adenopathy. Does not bruise/bleed easily.       Objective:         Vitals:    23 1053   BP: (!) 160/80   Pulse: 93   SpO2: 96%   Weight: 61.7 kg (136 lb 0.4 oz)   Height: 5' 2" (1.575 m)         .Physical Exam   Constitutional: She is oriented to person, place, and time. She appears well-developed and well-nourished in motorized scooter  HENT:   Head: Normocephalic.   Eyes: Conjunctivae and lids are normal.No scleral icterus.   Neck: Normal range of motion. Neck supple.  Cardiovascular: Normal rate, regular rhythm and normal heart sounds.    No murmur heard.  Pulmonary/Chest: Breath sounds normal. She has no wheezes. She has no rales.   Abdominal: Soft. Bowel sounds are normal. There is no " tenderness. There is no rebound and no guarding.   Musculoskeletal: Ambulates w/assistance of motorized scooter  Neurological: She is alert and oriented to person, place, and time. No cranial nerve deficit.  Skin: Skin is warm and dry. No ecchymosis, no petechiae and no rash noted. No erythema.   Psychiatric: She has a normal mood and affect.               Lab Results   Component Value Date    WBC 3.30 (L) 12/01/2023    HGB 11.5 (L) 12/01/2023    HCT 36.0 (L) 12/01/2023     (H) 12/01/2023     12/01/2023     Lab Results   Component Value Date    IRON 108 12/01/2023    TIBC 388 12/01/2023    FERRITIN 135 12/01/2023     SPEP-nl      CT renal 3/6/2017   IMPRESSION:  1.  No renal, ureteral or bladder calculi.  No hydronephrosis or ureterectasis.  2.  Poorly distended bladder.  Mild bladder wall prominence.  Mild cystitis cannot be   excluded.  3.  Moderate constipation.  Normal appendix      Lab Results   Component Value Date    IRON 108 12/01/2023    TIBC 388 12/01/2023    FERRITIN 135 12/01/2023     Lab Results   Component Value Date    WBC 3.30 (L) 12/01/2023    HGB 11.5 (L) 12/01/2023    HCT 36.0 (L) 12/01/2023     (H) 12/01/2023     12/01/2023         Assessment:       1. Anemia in chronic kidney disease, unspecified CKD stage    2. Refusal of blood transfusions as patient is Religion    3. Chronic kidney disease, unspecified CKD stage    4. Sarcoidosis                  Plan:   1.2.    Pt is a [employer] and declines/not interested in blood and blood products due to Synagogue beliefs  She is undergoing epo inj q 2wks  Pt followed by Nephrology . It has been determined early CKD stage III, suspect due to age-related renal nephron loss, along with possible diabetic nephropathy v. hypertensive nephrosclerosis  CBC q2wks STANDING  Cont  EPO dosage  inj q 2wks( pending lab parameters)  Continue periodic monitoring of Fe studies  According to KIDIGO guidelines patients with CKD  , a  gfr , <60 cc/min and anemia, iron therapy is appropriate if the Tsat is < 30 and ferritin < 500 mg/dl.    Check Fe  Follow-up with PCP for med mgmt    3 f/b nephrology  Last Cr level 1.1mg/dL on 6/16/23   Check cmp    4. F/b Rheumatology  Manifested by myopathy and arthropathy.  Persistent joint pain and myalgias despite prednisone 5 mg. Unable to tolerate immunosuppressants/steroid sparing agents  She remains on chronic po steroids  She is requesting steroid inj         CBC q2wks STANDING  Cont EPO  inj q 2wks( pending lab parameters) at increased dosage  F/u in 3mos with cbc, Fe studies prior to f/u         Advance Care Planning     Power of   I previously initiated the process of advance care planning today and explained the importance of this process to the patient.  I introduced the concept of advance directives to the patient, as well. Then the patient received detailed information about the importance of designating a Health Care Power of  (HCPOA). She was also instructed to communicate with this person about their wishes for future healthcare, should she become sick and lose decision-making capacity. The patient has  previously appointed a HCPOA. Pt completed in 2015           CC: Micaela Mendoza M.D.

## 2023-12-04 NOTE — Clinical Note
CBC q2wks STANDING Cont EPO  inj q 2wks( pending lab parameters) at increased dosage F/u in 3mos with cbc cmp, Fe studies prior to f/u

## 2023-12-06 ENCOUNTER — HOSPITAL ENCOUNTER (EMERGENCY)
Facility: HOSPITAL | Age: 78
Discharge: HOME | End: 2023-12-06
Attending: EMERGENCY MEDICINE
Payer: MEDICARE

## 2023-12-06 VITALS
RESPIRATION RATE: 17 BRPM | TEMPERATURE: 98 F | SYSTOLIC BLOOD PRESSURE: 170 MMHG | WEIGHT: 136 LBS | DIASTOLIC BLOOD PRESSURE: 80 MMHG | BODY MASS INDEX: 24.88 KG/M2 | OXYGEN SATURATION: 96 % | HEART RATE: 86 BPM

## 2023-12-06 DIAGNOSIS — R56.9 SEIZURE: ICD-10-CM

## 2023-12-06 LAB
ALBUMIN SERPL BCP-MCNC: 4.2 G/DL (ref 3.5–5.2)
ALP SERPL-CCNC: 57 U/L (ref 55–135)
ALT SERPL W/O P-5'-P-CCNC: 14 U/L (ref 10–44)
ANION GAP SERPL CALC-SCNC: 12 MMOL/L (ref 8–16)
AST SERPL-CCNC: 16 U/L (ref 10–40)
BASOPHILS # BLD AUTO: 0.04 K/UL (ref 0–0.2)
BASOPHILS NFR BLD: 0.8 % (ref 0–1.9)
BILIRUB SERPL-MCNC: 0.4 MG/DL (ref 0.1–1)
BUN SERPL-MCNC: 18 MG/DL (ref 8–23)
CALCIUM SERPL-MCNC: 9.3 MG/DL (ref 8.7–10.5)
CHLORIDE SERPL-SCNC: 97 MMOL/L (ref 95–110)
CO2 SERPL-SCNC: 26 MMOL/L (ref 23–29)
CREAT SERPL-MCNC: 1.4 MG/DL (ref 0.5–1.4)
DIFFERENTIAL METHOD: ABNORMAL
EOSINOPHIL # BLD AUTO: 0.1 K/UL (ref 0–0.5)
EOSINOPHIL NFR BLD: 1.2 % (ref 0–8)
ERYTHROCYTE [DISTWIDTH] IN BLOOD BY AUTOMATED COUNT: 12.6 % (ref 11.5–14.5)
EST. GFR  (NO RACE VARIABLE): 38.5 ML/MIN/1.73 M^2
GLUCOSE SERPL-MCNC: 113 MG/DL (ref 70–110)
HCT VFR BLD AUTO: 38.3 % (ref 37–48.5)
HCV AB SERPL QL IA: NORMAL
HGB BLD-MCNC: 12.9 G/DL (ref 12–16)
HIV 1+2 AB+HIV1 P24 AG SERPL QL IA: NORMAL
IMM GRANULOCYTES # BLD AUTO: 0.02 K/UL (ref 0–0.04)
IMM GRANULOCYTES NFR BLD AUTO: 0.4 % (ref 0–0.5)
LYMPHOCYTES # BLD AUTO: 1.5 K/UL (ref 1–4.8)
LYMPHOCYTES NFR BLD: 31.2 % (ref 18–48)
MCH RBC QN AUTO: 34.6 PG (ref 27–31)
MCHC RBC AUTO-ENTMCNC: 33.7 G/DL (ref 32–36)
MCV RBC AUTO: 103 FL (ref 82–98)
MONOCYTES # BLD AUTO: 0.4 K/UL (ref 0.3–1)
MONOCYTES NFR BLD: 8.5 % (ref 4–15)
NEUTROPHILS # BLD AUTO: 2.8 K/UL (ref 1.8–7.7)
NEUTROPHILS NFR BLD: 57.9 % (ref 38–73)
NRBC BLD-RTO: 0 /100 WBC
PLATELET # BLD AUTO: 371 K/UL (ref 150–450)
PMV BLD AUTO: 9.8 FL (ref 9.2–12.9)
POCT GLUCOSE: 118 MG/DL (ref 70–110)
POTASSIUM SERPL-SCNC: 3.8 MMOL/L (ref 3.5–5.1)
PROT SERPL-MCNC: 8.1 G/DL (ref 6–8.4)
RBC # BLD AUTO: 3.73 M/UL (ref 4–5.4)
SODIUM SERPL-SCNC: 135 MMOL/L (ref 136–145)
WBC # BLD AUTO: 4.81 K/UL (ref 3.9–12.7)

## 2023-12-06 PROCEDURE — 99285 EMERGENCY DEPT VISIT HI MDM: CPT | Mod: 25

## 2023-12-06 PROCEDURE — 93010 ELECTROCARDIOGRAM REPORT: CPT | Mod: ,,, | Performed by: INTERNAL MEDICINE

## 2023-12-06 PROCEDURE — 93005 ELECTROCARDIOGRAM TRACING: CPT

## 2023-12-06 PROCEDURE — 86803 HEPATITIS C AB TEST: CPT | Performed by: PHYSICIAN ASSISTANT

## 2023-12-06 PROCEDURE — 96374 THER/PROPH/DIAG INJ IV PUSH: CPT

## 2023-12-06 PROCEDURE — 80053 COMPREHEN METABOLIC PANEL: CPT | Performed by: EMERGENCY MEDICINE

## 2023-12-06 PROCEDURE — 63600175 PHARM REV CODE 636 W HCPCS: Performed by: EMERGENCY MEDICINE

## 2023-12-06 PROCEDURE — 85025 COMPLETE CBC W/AUTO DIFF WBC: CPT | Performed by: EMERGENCY MEDICINE

## 2023-12-06 PROCEDURE — 82962 GLUCOSE BLOOD TEST: CPT

## 2023-12-06 PROCEDURE — 93010 EKG 12-LEAD: ICD-10-PCS | Mod: ,,, | Performed by: INTERNAL MEDICINE

## 2023-12-06 PROCEDURE — 25000003 PHARM REV CODE 250: Performed by: EMERGENCY MEDICINE

## 2023-12-06 PROCEDURE — 87389 HIV-1 AG W/HIV-1&-2 AB AG IA: CPT | Performed by: PHYSICIAN ASSISTANT

## 2023-12-06 RX ORDER — ACETAMINOPHEN 500 MG
1000 TABLET ORAL
Status: COMPLETED | OUTPATIENT
Start: 2023-12-06 | End: 2023-12-06

## 2023-12-06 RX ORDER — LEVETIRACETAM 500 MG/5ML
1000 INJECTION, SOLUTION, CONCENTRATE INTRAVENOUS
Status: COMPLETED | OUTPATIENT
Start: 2023-12-06 | End: 2023-12-06

## 2023-12-06 RX ORDER — LEVETIRACETAM 500 MG/1
500 TABLET ORAL 2 TIMES DAILY
Qty: 60 TABLET | Refills: 5 | Status: SHIPPED | OUTPATIENT
Start: 2023-12-06 | End: 2024-12-05

## 2023-12-06 RX ADMIN — ACETAMINOPHEN 1000 MG: 500 TABLET ORAL at 05:12

## 2023-12-06 RX ADMIN — LEVETIRACETAM 1000 MG: 100 INJECTION, SOLUTION INTRAVENOUS at 02:12

## 2023-12-06 NOTE — ED TRIAGE NOTES
Regino Lawrence, a 78 y.o. female presents to the ED w/ complaint of seizure. Per the pt's family the pt was at home sitting on bed when she had a witnessed seizure. Pt does have a history of seizure activity and takes medication daily. Pt doesn't have any recollection   of event but is AAO X4.     Triage note:  Chief Complaint   Patient presents with    Seizures     With daughter st home. Sitting on edge of bed, had witnessed seizure like activity. Takes keppra. No trauma     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, without long-term current use of insulin     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, without long-term current use of insulin     Depression     Diabetes mellitus, type 2     Eye injury as a child     k-abrasion  od    Hyperlipidemia     Hypertension     Hypothyroidism     Immune deficiency disorder     Immune disorder     LOC (loss of consciousness) 03/12/2021    at home    Myalgia and myositis 9/6/2012    Osteoporosis     Polyneuropathy     Pulmonary embolism 7/10/2021    Renal manifestation of secondary diabetes mellitus     Sarcoidosis     Seizure     Type 2 diabetes mellitus     Ulcer     no cancer    Urinary incontinence

## 2023-12-07 NOTE — ED PROVIDER NOTES
Encounter Date: 12/6/2023       History     Chief Complaint   Patient presents with    Seizures     With daughter st home. Sitting on edge of bed, had witnessed seizure like activity. Takes keppra. No trauma     78-year-old female with history of seizure presents with a seizure.  She is with her daughter and she started to convulse.  Her back arched her head went back and then all her extremities started shaking.  It was brief and then it stopped spontaneously.  She had a similar seizure in the past.  She is on Keppra 250 mg twice a day.  She is been taking her medications.  She is been feeling fine lately and now she is back to normal.  Her main complaint is joint pains from arthritis.  It is worse in the hand.  She denies any injury from the seizure.  She is breathing normally.        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, without long-term current use of insulin     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, without long-term current use of insulin     Depression     Diabetes mellitus, type 2     Eye injury as a child     k-abrasion  od    Hyperlipidemia     Hypertension     Hypothyroidism     Immune deficiency disorder     Immune disorder     LOC (loss of consciousness) 03/12/2021    at home    Myalgia and myositis 9/6/2012    Osteoporosis     Polyneuropathy     Pulmonary embolism 7/10/2021    Renal manifestation of secondary diabetes mellitus     Sarcoidosis     Seizure     Type 2 diabetes mellitus     Ulcer     no cancer    Urinary incontinence       Past Surgical History:   Procedure Laterality Date    CARPAL TUNNEL RELEASE      Rt wrist    CATARACT EXTRACTION W/  INTRAOCULAR LENS IMPLANT Right 2015    Dr. Azevedo    CATARACT EXTRACTION W/  INTRAOCULAR LENS IMPLANT Left 2015    Dr. Azevedo    CERVICAL SPINE SURGERY       SECTION      CHOLECYSTECTOMY      INJECTION OF ANESTHETIC AGENT AROUND NERVE Left 2020    Procedure: BLOCK, NERVE LEFT FEMORAL AND OBTURATOR;  Surgeon: Alfonso Richards MD;  Location: [facility];  Service: Pain Management;  Laterality: Left;  NEEDS CONSENT    INJECTION OF ANESTHETIC AGENT AROUND NERVE Bilateral 10/9/2023    Procedure: BLOCK, NERVE, BILATERAL L3-L4-L5 MEDIAL BRANCH;  Surgeon: Alfonso Richards MD;  Location: [facility];  Service: Pain Management;  Laterality: Bilateral;    INJECTION OF JOINT Left 3/21/2019    Procedure: Injection, Joint  fLUOROSCOPIC jOINT iNJECTION (hIP iNJECTION) LEFT ROCH BURSA AS WELL LEFT TROCHANTERIC BURSA;  Surgeon: Alfonso Richards MD;  Location: [facility];  Service: Pain Management;  Laterality: Left;  NEEDS CONSENT, DIABETIC    INJECTION OF JOINT Left 2019    Procedure: Injection, Joint FLUOROSCOPIC JOINT INJECTION (HIP INJECTION) LEFT HIP;  Surgeon: Alfonso Richards MD;  Location: [facility];  Service: Pain Management;  Laterality: Left;  NEEDS CONSENT    INJECTION OF JOINT Left 2019    Procedure: INJECTION, JOINT;  Surgeon: Alfonso Richards MD;  Location: [facility];  Service: Pain Management;  Laterality: Left;  Left Hip and Left GTB Injections    INJECTION OF JOINT Left 2020    Procedure: INJECTION, JOINT, LEFT HIP and LEFT GREATER TROCHANTERIC BURSA;  Surgeon: Alfonso Richards MD;  Location: [facility];  Service: Pain Management;  Laterality: Left;  INJECTION, JOINT, LEFT HIP and LEFT GREATER TROCHANTERIC BURSA    INJECTION OF JOINT Left 9/3/2020    Procedure: INJECTION, JOINT, LEFT SI;  Surgeon: Alfonso Richards MD;  Location: [facility];  Service: Pain  Management;  Laterality: Left;  INJECTION, JOINT, LEFT SI    TRANSFORAMINAL EPIDURAL INJECTION OF STEROID Bilateral 12/5/2019    Procedure: INJECTION, STEROID, EPIDURAL, TRANSFORAMINAL APPROACH;  Surgeon: lAfonso Richards MD;  Location: [facility];  Service: Pain Management;  Laterality: Bilateral;  B/L TF RHIANNA L5  Consent Needed    TRANSFORAMINAL EPIDURAL INJECTION OF STEROID Bilateral 6/29/2020    Procedure: INJECTION, STEROID, EPIDURAL, TRANSFORAMINAL APPROACH L5/S1;  Surgeon: Alfonso Richards MD;  Location: [facility];  Service: Pain Management;  Laterality: Bilateral;  B/L TF RHIANNA L5/S1    TUBAL LIGATION       Family History   Problem Relation Age of Onset    Hypertension Mother     Cataracts Mother     No Known Problems Father     Hypertension Maternal Grandmother     Glaucoma Sister     Arthritis Sister     No Known Problems Brother     No Known Problems Maternal Aunt     No Known Problems Maternal Uncle     No Known Problems Paternal Aunt     No Known Problems Paternal Uncle     No Known Problems Maternal Grandfather     No Known Problems Paternal Grandmother     No Known Problems Paternal Grandfather     Kidney failure Sister     Hepatitis Sister     Cancer Sister         bone cancer     Immunodeficiency Sister     Diabetes Son     Hypertension Son     Lupus Neg Hx     Rheum arthritis Neg Hx     Amblyopia Neg Hx     Blindness Neg Hx     Macular degeneration Neg Hx     Retinal detachment Neg Hx     Strabismus Neg Hx     Stroke Neg Hx     Thyroid disease Neg Hx     Endometrial cancer Neg Hx     Vaginal cancer Neg Hx     Cervical cancer Neg Hx      Social History     Tobacco Use    Smoking status: Never     Passive exposure: Never    Smokeless tobacco: Never   Substance Use Topics    Alcohol use: No    Drug use: No     Review of Systems    Physical Exam     Initial Vitals [12/06/23 1317]   BP Pulse Resp Temp SpO2   (!) 142/76 102 18 98.5 °F (36.9 °C) 98 %      MAP       --         Physical Exam    Constitutional:  She appears well-developed and well-nourished. She is not diaphoretic. No distress.   HENT:   Head: Normocephalic and atraumatic.   Eyes: Conjunctivae are normal.   Neck: Neck supple. No tracheal deviation present. No JVD present.   Normal range of motion.  Cardiovascular:  Normal rate, regular rhythm, normal heart sounds and intact distal pulses.           Pulmonary/Chest: Breath sounds normal. No respiratory distress. She has no wheezes. She has no rhonchi. She has no rales.   Abdominal: Abdomen is soft. Bowel sounds are normal. She exhibits no distension. There is no abdominal tenderness. There is no rebound.   Musculoskeletal:         General: No edema.      Cervical back: Normal range of motion and neck supple.      Comments: Arthritic changes to hands     Neurological: She is alert. She has normal strength. No sensory deficit. GCS score is 15. GCS eye subscore is 4. GCS verbal subscore is 5. GCS motor subscore is 6.   Skin: Skin is warm. No rash noted.   Psychiatric: She has a normal mood and affect.         ED Course   Procedures  Labs Reviewed   CBC W/ AUTO DIFFERENTIAL - Abnormal; Notable for the following components:       Result Value    RBC 3.73 (*)      (*)     MCH 34.6 (*)     All other components within normal limits   COMPREHENSIVE METABOLIC PANEL - Abnormal; Notable for the following components:    Sodium 135 (*)     Glucose 113 (*)     eGFR 38.5 (*)     All other components within normal limits   POCT GLUCOSE - Abnormal; Notable for the following components:    POCT Glucose 118 (*)     All other components within normal limits   HIV 1 / 2 ANTIBODY    Narrative:     Release to patient->Immediate   HEPATITIS C ANTIBODY    Narrative:     Release to patient->Immediate   POCT GLUCOSE MONITORING CONTINUOUS     EKG Readings: (Independently Interpreted)   Normal sinus rhythm without ischemic changes     ECG Results              EKG 12-lead (Final result)  Result time 12/06/23 18:08:24      Final  result by Interface, Lab In [facility] (12/06/23 18:08:24)                   Narrative:    Test Reason : R56.9,    Vent. Rate : 095 BPM     Atrial Rate : 095 BPM     P-R Int : 138 ms          QRS Dur : 076 ms      QT Int : 372 ms       P-R-T Axes : 055 011 010 degrees     QTc Int : 467 ms    Normal sinus rhythm with sinus arrhythmia  Normal ECG  When compared with ECG of 29-DEC-2022 22:55,  No significant change was found  Confirmed by Pedrito RAGLAND MD ([id number]) on 12/6/2023 6:08:16 PM    Referred By: [email or web address]   SELF           Confirmed By:Pedrito RAGLAND MD                                  Imaging Results              CT Head Without Contrast (Final result)  Result time 12/06/23 16:20:59      Final result by Juan Tan DO (12/06/23 16:20:59)                   Impression:      No acute intracranial findings specifically without evidence for acute intracranial hemorrhage or sulcal effacement to suggest large territory recent infarction    Continued cerebral volume loss with patchy and confluent decreased attenuation supratentorial white matter while nonspecific may be sequela of chronic ischemic change    Stable prominence lateral and 3rd ventricles which may be related to volume loss, however normal pressure hydrocephalus to be considered in differential in appropriate clinical setting.    Further evaluation as warranted clinically.      Electronically signed by: Juan Tan DO  Date:    12/06/2023  Time:    16:20               Narrative:    EXAMINATION:  CT HEAD WITHOUT CONTRAST    CLINICAL HISTORY:  Seizure, new-onset, no history of trauma;    TECHNIQUE:  Multiple sequential 5 mm axial images of the head without contrast.  Coronal and sagittal reformatted imaging from the axial acquisition.    COMPARISON:  MRI 07/09/2021    FINDINGS:  Generalized cerebral volume loss with compensatory enlargement ventricles sulci and cisterns similar to prior.  No evidence for acute hydrocephalus.  Continued slight prominence of the  lateral 3rd ventricles which may all be related to volume loss normal pressure hydrocephalus to be included in differential in appropriate clinical setting..  There is confluent decreased attenuation supratentorial white matter which is nonspecific and suggestive for chronic ischemic change as seen on prior MRI.  There is small focal remote right basal gangliar lacunar type infarct.  No significant opacification paranasal sinuses or mastoid air cells.                                       X-Ray Chest 1 View (Final result)  Result time 12/06/23 14:52:42      Final result by Farooq Morales MD (12/06/23 14:52:42)                   Impression:      No acute cardiopulmonary process.      Electronically signed by: Farooq Morales  Date:    12/06/2023  Time:    14:52               Narrative:    EXAMINATION:  XR CHEST 1 VIEW    CLINICAL HISTORY:  Unspecified convulsions    TECHNIQUE:  Single frontal view of the chest was performed.    COMPARISON:  12/30/2022    FINDINGS:  Normal cardiomediastinal silhouette.  Lungs are clear.  No airspace opacity, pleural effusion, or pneumothorax.  Postsurgical changes of the cervical spine.                                       Medications   levETIRAcetam injection 1,000 mg (1,000 mg Intravenous Given 12/6/23 1427)   acetaminophen tablet 1,000 mg (1,000 mg Oral Given 12/6/23 1756)     Medical Decision Making  Patient with generalized tonic-clonic seizure that is resolved.  Will give dose of Keppra IV emergently.  Will check head CT labs EKG.    I reviewed studies obtained more history from daughter.  This is inserted into the record in the HPI.  Patient remained comfortable in the ED.  Tylenol given for joint pain.  Will discharge home in stable condition    Amount and/or Complexity of Data Reviewed  Labs: ordered.  Radiology: ordered.    Risk  OTC drugs.  Prescription drug management.              Attending Attestation:         Attending Critical Care:   Critical Care Times:   Direct  Patient Care (initial evaluation, reassessments, and time considering the case)................................................................24 minutes.   Additional History from reviewing old medical records or taking additional history from the family, EMS, PCP, etc.......................3 minutes.   Ordering, Reviewing, and Interpreting Diagnostic Studies...............................................................................................................3 minutes.   Documentation..................................................................................................................................................................................3 minutes.   Consultation with the patient's family directly relating to the patient's condition, care, and DNR status (when patient unable)......3 minutes.   ==============================================================  Total Critical Care Time - exclusive of procedural time: 36 minutes.  ==============================================================                                 Clinical Impression:  Final diagnoses:  [R56.9] Seizure          ED Disposition Condition    Discharge Stable          ED Prescriptions       Medication Sig Dispense Start Date End Date Auth. Provider    levETIRAcetam (KEPPRA) 500 MG Tab Take 1 tablet (500 mg total) by mouth 2 (two) times daily. 60 tablet 12/6/2023 12/5/2024 Vernon Barboza MD          Follow-up Information       Follow up With Specialties Details Why Contact Info    Micaela Mendoza MD Family Medicine Call in 1 day  [address] [facility] [address]  [phone]      [facility] - Emergency Dept Emergency Medicine  If symptoms worsen [address]  [facility] [address]  [phone]             Vernon Barboza MD  12/06/23 2014

## 2023-12-11 ENCOUNTER — PATIENT MESSAGE (OUTPATIENT)
Dept: RHEUMATOLOGY | Facility: CLINIC | Age: 78
End: 2023-12-11
Payer: MEDICARE

## 2023-12-11 DIAGNOSIS — G72.9 MYOPATHY: ICD-10-CM

## 2023-12-11 DIAGNOSIS — D86.9 SARCOIDOSIS: ICD-10-CM

## 2023-12-11 RX ORDER — PREDNISONE 5 MG/1
TABLET ORAL
Qty: 15 TABLET | Refills: 0 | Status: CANCELLED | OUTPATIENT
Start: 2023-12-11

## 2023-12-11 RX ORDER — PREDNISONE 1 MG/1
TABLET ORAL
Qty: 120 TABLET | Refills: 0 | Status: SHIPPED | OUTPATIENT
Start: 2023-12-11 | End: 2024-01-10 | Stop reason: SDUPTHER

## 2023-12-15 ENCOUNTER — INFUSION (OUTPATIENT)
Dept: INFUSION THERAPY | Facility: HOSPITAL | Age: 78
End: 2023-12-15
Attending: INTERNAL MEDICINE
Payer: MEDICARE

## 2023-12-15 VITALS
DIASTOLIC BLOOD PRESSURE: 70 MMHG | HEART RATE: 91 BPM | SYSTOLIC BLOOD PRESSURE: 145 MMHG | TEMPERATURE: 98 F | RESPIRATION RATE: 18 BRPM | OXYGEN SATURATION: 95 %

## 2023-12-15 DIAGNOSIS — D63.1 ANEMIA IN STAGE 3 CHRONIC KIDNEY DISEASE, UNSPECIFIED WHETHER STAGE 3A OR 3B CKD: ICD-10-CM

## 2023-12-15 DIAGNOSIS — N18.9 ANEMIA IN CHRONIC KIDNEY DISEASE, UNSPECIFIED CKD STAGE: ICD-10-CM

## 2023-12-15 DIAGNOSIS — N18.30 ANEMIA IN STAGE 3 CHRONIC KIDNEY DISEASE: Primary | ICD-10-CM

## 2023-12-15 DIAGNOSIS — Z53.1 REFUSAL OF BLOOD TRANSFUSIONS AS PATIENT IS JEHOVAH'S WITNESS: ICD-10-CM

## 2023-12-15 DIAGNOSIS — D63.1 ANEMIA IN CHRONIC KIDNEY DISEASE, UNSPECIFIED CKD STAGE: ICD-10-CM

## 2023-12-15 DIAGNOSIS — N18.30 ANEMIA IN STAGE 3 CHRONIC KIDNEY DISEASE, UNSPECIFIED WHETHER STAGE 3A OR 3B CKD: ICD-10-CM

## 2023-12-15 DIAGNOSIS — D50.9 IRON DEFICIENCY ANEMIA, UNSPECIFIED IRON DEFICIENCY ANEMIA TYPE: ICD-10-CM

## 2023-12-15 DIAGNOSIS — D63.1 ANEMIA IN STAGE 3 CHRONIC KIDNEY DISEASE: Primary | ICD-10-CM

## 2023-12-15 PROCEDURE — 63600175 PHARM REV CODE 636 W HCPCS: Mod: JZ,EC,JG | Performed by: INTERNAL MEDICINE

## 2023-12-15 PROCEDURE — 96372 THER/PROPH/DIAG INJ SC/IM: CPT

## 2023-12-15 RX ADMIN — EPOETIN ALFA-EPBX 40000 UNITS: 40000 INJECTION, SOLUTION INTRAVENOUS; SUBCUTANEOUS at 12:12

## 2023-12-15 NOTE — PLAN OF CARE
Patient arrives to unit for scheduled Retacrit injection. Hgb resulted today is 10.8, parameters met (hold if Hgb 11). Injection administered and patient tolerated well.

## 2023-12-18 ENCOUNTER — LAB VISIT (OUTPATIENT)
Dept: LAB | Facility: HOSPITAL | Age: 78
End: 2023-12-18
Attending: FAMILY MEDICINE
Payer: MEDICARE

## 2023-12-18 ENCOUNTER — OFFICE VISIT (OUTPATIENT)
Dept: FAMILY MEDICINE | Facility: CLINIC | Age: 78
End: 2023-12-18
Payer: MEDICARE

## 2023-12-18 VITALS
SYSTOLIC BLOOD PRESSURE: 164 MMHG | HEIGHT: 62 IN | HEART RATE: 95 BPM | OXYGEN SATURATION: 96 % | WEIGHT: 135.81 LBS | TEMPERATURE: 98 F | BODY MASS INDEX: 24.99 KG/M2 | DIASTOLIC BLOOD PRESSURE: 80 MMHG

## 2023-12-18 DIAGNOSIS — M81.0 OSTEOPOROSIS, UNSPECIFIED OSTEOPOROSIS TYPE, UNSPECIFIED PATHOLOGICAL FRACTURE PRESENCE: ICD-10-CM

## 2023-12-18 DIAGNOSIS — I10 ESSENTIAL HYPERTENSION: Chronic | ICD-10-CM

## 2023-12-18 DIAGNOSIS — G89.4 CHRONIC PAIN SYNDROME: ICD-10-CM

## 2023-12-18 DIAGNOSIS — R56.9 SEIZURE: Primary | ICD-10-CM

## 2023-12-18 DIAGNOSIS — E11.44: ICD-10-CM

## 2023-12-18 DIAGNOSIS — D86.9 SARCOIDOSIS: Chronic | ICD-10-CM

## 2023-12-18 DIAGNOSIS — R56.9 SEIZURE: ICD-10-CM

## 2023-12-18 LAB
ALBUMIN SERPL BCP-MCNC: 4 G/DL (ref 3.5–5.2)
ALP SERPL-CCNC: 54 U/L (ref 55–135)
ALT SERPL W/O P-5'-P-CCNC: 15 U/L (ref 10–44)
ANION GAP SERPL CALC-SCNC: 13 MMOL/L (ref 8–16)
AST SERPL-CCNC: 18 U/L (ref 10–40)
BILIRUB SERPL-MCNC: 0.4 MG/DL (ref 0.1–1)
BUN SERPL-MCNC: 19 MG/DL (ref 8–23)
CALCIUM SERPL-MCNC: 9.2 MG/DL (ref 8.7–10.5)
CHLORIDE SERPL-SCNC: 101 MMOL/L (ref 95–110)
CO2 SERPL-SCNC: 24 MMOL/L (ref 23–29)
CREAT SERPL-MCNC: 1.5 MG/DL (ref 0.5–1.4)
EST. GFR  (NO RACE VARIABLE): 35 ML/MIN/1.73 M^2
GLUCOSE SERPL-MCNC: 91 MG/DL (ref 70–110)
POTASSIUM SERPL-SCNC: 4.1 MMOL/L (ref 3.5–5.1)
PROT SERPL-MCNC: 7.7 G/DL (ref 6–8.4)
SODIUM SERPL-SCNC: 138 MMOL/L (ref 136–145)

## 2023-12-18 PROCEDURE — 3077F PR MOST RECENT SYSTOLIC BLOOD PRESSURE >= 140 MM HG: ICD-10-PCS | Mod: CPTII,S$GLB,, | Performed by: FAMILY MEDICINE

## 2023-12-18 PROCEDURE — 99999 PR PBB SHADOW E&M-EST. PATIENT-LVL III: ICD-10-PCS | Mod: PBBFAC,,, | Performed by: FAMILY MEDICINE

## 2023-12-18 PROCEDURE — 1101F PR PT FALLS ASSESS DOC 0-1 FALLS W/OUT INJ PAST YR: ICD-10-PCS | Mod: CPTII,S$GLB,, | Performed by: FAMILY MEDICINE

## 2023-12-18 PROCEDURE — 1125F AMNT PAIN NOTED PAIN PRSNT: CPT | Mod: CPTII,S$GLB,, | Performed by: FAMILY MEDICINE

## 2023-12-18 PROCEDURE — 3079F DIAST BP 80-89 MM HG: CPT | Mod: CPTII,S$GLB,, | Performed by: FAMILY MEDICINE

## 2023-12-18 PROCEDURE — 1101F PT FALLS ASSESS-DOCD LE1/YR: CPT | Mod: CPTII,S$GLB,, | Performed by: FAMILY MEDICINE

## 2023-12-18 PROCEDURE — 99214 OFFICE O/P EST MOD 30 MIN: CPT | Mod: S$GLB,,, | Performed by: FAMILY MEDICINE

## 2023-12-18 PROCEDURE — 1159F MED LIST DOCD IN RCRD: CPT | Mod: CPTII,S$GLB,, | Performed by: FAMILY MEDICINE

## 2023-12-18 PROCEDURE — 1159F PR MEDICATION LIST DOCUMENTED IN MEDICAL RECORD: ICD-10-PCS | Mod: CPTII,S$GLB,, | Performed by: FAMILY MEDICINE

## 2023-12-18 PROCEDURE — 1157F ADVNC CARE PLAN IN RCRD: CPT | Mod: CPTII,S$GLB,, | Performed by: FAMILY MEDICINE

## 2023-12-18 PROCEDURE — 3288F PR FALLS RISK ASSESSMENT DOCUMENTED: ICD-10-PCS | Mod: CPTII,S$GLB,, | Performed by: FAMILY MEDICINE

## 2023-12-18 PROCEDURE — 80053 COMPREHEN METABOLIC PANEL: CPT | Performed by: FAMILY MEDICINE

## 2023-12-18 PROCEDURE — 80177 DRUG SCRN QUAN LEVETIRACETAM: CPT | Performed by: FAMILY MEDICINE

## 2023-12-18 PROCEDURE — 36415 COLL VENOUS BLD VENIPUNCTURE: CPT | Mod: PN | Performed by: FAMILY MEDICINE

## 2023-12-18 PROCEDURE — 3079F PR MOST RECENT DIASTOLIC BLOOD PRESSURE 80-89 MM HG: ICD-10-PCS | Mod: CPTII,S$GLB,, | Performed by: FAMILY MEDICINE

## 2023-12-18 PROCEDURE — 99999 PR PBB SHADOW E&M-EST. PATIENT-LVL III: CPT | Mod: PBBFAC,,, | Performed by: FAMILY MEDICINE

## 2023-12-18 PROCEDURE — 1157F PR ADVANCE CARE PLAN OR EQUIV PRESENT IN MEDICAL RECORD: ICD-10-PCS | Mod: CPTII,S$GLB,, | Performed by: FAMILY MEDICINE

## 2023-12-18 PROCEDURE — 1125F PR PAIN SEVERITY QUANTIFIED, PAIN PRESENT: ICD-10-PCS | Mod: CPTII,S$GLB,, | Performed by: FAMILY MEDICINE

## 2023-12-18 PROCEDURE — 83036 HEMOGLOBIN GLYCOSYLATED A1C: CPT | Performed by: FAMILY MEDICINE

## 2023-12-18 PROCEDURE — 99214 PR OFFICE/OUTPT VISIT, EST, LEVL IV, 30-39 MIN: ICD-10-PCS | Mod: S$GLB,,, | Performed by: FAMILY MEDICINE

## 2023-12-18 PROCEDURE — 3077F SYST BP >= 140 MM HG: CPT | Mod: CPTII,S$GLB,, | Performed by: FAMILY MEDICINE

## 2023-12-18 PROCEDURE — 3288F FALL RISK ASSESSMENT DOCD: CPT | Mod: CPTII,S$GLB,, | Performed by: FAMILY MEDICINE

## 2023-12-18 RX ORDER — INSULIN GLARGINE 100 [IU]/ML
10 INJECTION, SOLUTION SUBCUTANEOUS NIGHTLY
Qty: 18 ML | Refills: 1 | Status: SHIPPED | OUTPATIENT
Start: 2023-12-18 | End: 2024-12-12

## 2023-12-18 NOTE — PROGRESS NOTES
Chief Complaint   Patient presents with    Follow-up       HPI  Regino Lawrence is a 78 y.o. female with multiple medical diagnoses as listed in the medical history and problem list that presents for follow-up for ED f/u for seizure    She was seen in the ED for seizure like activity on keppra, she has had worsening low back pain that did not improve with procedure for nerve block done in October at [facility], she would like to transfer care back to the [facility]; her keppra was increased and she has not had any drowsiness;    She has had elevated blood sugar as high as 400 since her medicine was increased, she has also been back on prednisone 4mg daily       ALLERGIES AND MEDICATIONS: updated and reviewed.  Review of patient's allergies indicates:   Allergen Reactions    Azathioprine Shortness Of Breath and Other (See Comments)     Fatigue     Medication List with Changes/Refills   New Medications    INSULIN (LANTUS SOLOSTAR U-100 INSULIN) GLARGINE 100 UNITS/ML SUBQ PEN    Inject 10 Units into the skin every evening.   Current Medications    ACCU-CHEK FASTCLIX LANCING DEV KIT    USE AS DIRECTED.    ALBUTEROL-IPRATROPIUM (DUO-NEB) 2.5 MG-0.5 MG/3 ML NEBULIZER SOLUTION    Take 3 mLs by nebulization every 4 (four) hours as needed for Wheezing or Shortness of Breath. Rescue    ALCOHOL ANTISEPTIC PADS (ALCOHOL PREP PADS TOP)        APIXABAN (ELIQUIS) 5 MG TAB    Take 1 tablet (5 mg total) by mouth 2 (two) times daily. Start this prescription after finishing starter pack    ATORVASTATIN (LIPITOR) 20 MG TABLET    Take 1 tablet (20 mg total) by mouth once daily.    BLOOD SUGAR DIAGNOSTIC (ACCU-CHEK SMARTVIEW TEST STRIP) STRP    Use as directed to check blood sugar twice daily.    BLOOD SUGAR DIAGNOSTIC STRP    To check BG three times daily, to use with insurance preferred meter    BLOOD-GLUCOSE METER KIT    Use as instructed    CALCIUM CARBONATE (OS-MALCOLM) 600 MG CALCIUM (1,500 MG) TAB    Take 1 tablet by mouth 2 (two) times  "daily.     CARVEDILOL (COREG) 25 MG TABLET    Take 1 tablet (25 mg total) by mouth 2 (two) times daily.    CETIRIZINE (ZYRTEC) 10 MG TABLET    Take 1 tablet (10 mg total) by mouth once daily.    DICLOFENAC SODIUM (VOLTAREN) 1 % GEL    Apply 2 g topically once daily.    DULOXETINE (CYMBALTA) 30 MG CAPSULE    Take 1 capsule (30 mg total) by mouth once daily.    EPOETIN WOLFGANG-EPBX (RETACRIT) 10,000 UNIT/ML IMJECTION    Inject 20,000 Units into the skin every 14 (fourteen) days.    FENOFIBRATE 160 MG TAB    Take 1 tablet (160 mg total) by mouth once daily.    FLUTICASONE PROPION-SALMETEROL 115-21 MCG/DOSE (ADVAIR HFA) 115-21 MCG/ACTUATION HFAA INHALER    Inhale 2 puffs into the lungs every 12 (twelve) hours. Controller    FOLIC ACID (FOLVITE) 1 MG TABLET    Take 1 tablet (1,000 mcg total) by mouth once daily.    HYDRALAZINE (APRESOLINE) 25 MG TABLET    Take 2 tabs every 8 hours by mouth. Check BP 2 hours after each dose and if Blood pressure >160- please take 1 extra tab    KLGA KETOPROFEN 10% LIDOCAINE 10% GABAPENTIN 6% AMITRIPTYLINE 2% IN TRANSDERMAL CREAM    Apply 1 to 2 grams 3 to 4 times daily    LEVETIRACETAM (KEPPRA) 500 MG TAB    Take 1 tablet (500 mg total) by mouth 2 (two) times daily.    LEVOTHYROXINE (SYNTHROID) 50 MCG TABLET    Take 1 tablet (50 mcg total) by mouth before breakfast.    NIFEDIPINE (PROCARDIA-XL) 90 MG (OSM) 24 HR TABLET    Take 1 tablet (90 mg total) by mouth once daily.    OLOPATADINE (PATANOL) 0.1 % OPHTHALMIC SOLUTION    Place 1 drop into both eyes daily as needed for Allergies.    PANTOPRAZOLE (PROTONIX) 40 MG TABLET    Take 1 tablet (40 mg total) by mouth once daily.    PEN NEEDLE, DIABETIC (NOVOFINE 32) 32 GAUGE X 1/4" NDLE    For use with [facility] pen .    PREDNISONE (DELTASONE) 1 MG TABLET    4 tabs po daily for 1 month then decrease by 1 tab every month if labs allow    PREDNISONE (DELTASONE) 5 MG TABLET    5 mg daily as needed for pain    TIZANIDINE (ZANAFLEX) 2 MG TABLET    Take 2 " "tablets (4 mg total) by mouth every 8 (eight) hours as needed (muscle spasm).   Discontinued Medications    LEVEMIR FLEXPEN 100 UNIT/ML (3 ML) INPN PEN    INJECT 10 UNITS SUBCUTANEOUSLY IN THE EVENING       ROS  Review of Systems   Constitutional:  Negative for chills, diaphoresis, fatigue, fever and unexpected weight change.   HENT:  Negative for rhinorrhea, sinus pressure, sore throat and tinnitus.    Eyes:  Negative for photophobia and visual disturbance.   Respiratory:  Negative for cough, shortness of breath and wheezing.    Cardiovascular:  Negative for chest pain and palpitations.   Gastrointestinal:  Negative for abdominal pain, blood in stool, constipation, diarrhea, nausea and vomiting.   Genitourinary:  Negative for dysuria, flank pain, frequency and vaginal discharge.   Musculoskeletal:  Positive for arthralgias and back pain. Negative for joint swelling.   Skin:  Negative for rash.   Neurological:  Negative for speech difficulty, weakness, light-headedness and headaches.   Psychiatric/Behavioral:  Negative for behavioral problems, decreased concentration and dysphoric mood.        Physical Exam  Vitals:    12/18/23 1410   BP: (!) 164/80   BP Location: Right arm   Patient Position: Sitting   BP Method: Medium (Manual)   Pulse: 95   Temp: 98.4 °F (36.9 °C)   TempSrc: Oral   SpO2: 96%   Weight: 61.6 kg (135 lb 12.9 oz)   Height: 5' 2" (1.575 m)    Body mass index is 24.84 kg/m².  Weight: 61.6 kg (135 lb 12.9 oz)   Height: 5' 2" (157.5 cm)     Physical Exam  Vitals and nursing note reviewed.   Constitutional:       Appearance: She is well-developed.   Skin:     General: Skin is warm and dry.      Findings: No erythema or rash.   Neurological:      Mental Status: She is alert. Mental status is at baseline.   Psychiatric:         Behavior: Behavior normal.         Health Maintenance         Date Due Completion Date    RSV Vaccine (Age 60+ and Pregnant patients) (1 - 1-dose 60+ series) Never done ---    " Shingles Vaccine (1 of 2) 07/20/2015 5/25/2015    Mammogram 09/14/2022 9/14/2020    Lipid Panel 12/09/2023 12/9/2022    COVID-19 Vaccine (6 - 2023-24 season) 12/29/2023 11/3/2023    Hemoglobin A1c 03/08/2024 9/8/2023    Diabetes Urine Screening 09/08/2024 9/8/2023    Eye Exam 10/25/2024 10/25/2023    DEXA Scan 11/07/2024 11/7/2022    TETANUS VACCINE 05/16/2026 5/16/2016            Health maintenance reviewed and addressed as ordered      ASSESSMENT/PLAN       1. Seizure  She has been missing doses of keppra out of concern for her BG  Check level  May need to change medication if BG changes continue  - Levetiracetam level; Future  - Comprehensive Metabolic Panel; Future    2. Essential hypertension  Likely due to pain    3. Sarcoidosis  She is on prednisone  F/u with rheumatology    4. 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  Dispense: 18 mL; Refill: 1    5. Osteoporosis, unspecified osteoporosis type, unspecified pathological fracture presence  Begin prolia after dental procedure    6. Chronic pain syndrome  S/p ablation, still having pain  She would like to transfer care to   Narcotics not advised due to hx of falls leading to compression fx    - Ambulatory referral/consult to Pain Clinic; Future        Micaela Mendoza MD  12/18/2023 2:16 PM        No follow-ups on file.    Orders Placed This Encounter   Procedures    Levetiracetam level    Hemoglobin A1C    Comprehensive Metabolic Panel    Ambulatory referral/consult to Pain Clinic

## 2023-12-19 LAB
ESTIMATED AVG GLUCOSE: 114 MG/DL (ref 68–131)
HBA1C MFR BLD: 5.6 % (ref 4–5.6)

## 2023-12-20 ENCOUNTER — TELEPHONE (OUTPATIENT)
Dept: NEUROLOGY | Facility: CLINIC | Age: 78
End: 2023-12-20
Payer: MEDICARE

## 2023-12-20 ENCOUNTER — PATIENT MESSAGE (OUTPATIENT)
Dept: NEUROLOGY | Facility: CLINIC | Age: 78
End: 2023-12-20
Payer: MEDICARE

## 2023-12-22 LAB — LEVETIRACETAM SERPL-MCNC: 11.6 UG/ML (ref 3–60)

## 2023-12-29 ENCOUNTER — INFUSION (OUTPATIENT)
Dept: INFUSION THERAPY | Facility: HOSPITAL | Age: 78
End: 2023-12-29
Attending: INTERNAL MEDICINE
Payer: MEDICARE

## 2023-12-29 VITALS
HEART RATE: 84 BPM | RESPIRATION RATE: 18 BRPM | SYSTOLIC BLOOD PRESSURE: 160 MMHG | DIASTOLIC BLOOD PRESSURE: 77 MMHG | OXYGEN SATURATION: 96 % | TEMPERATURE: 98 F

## 2023-12-29 DIAGNOSIS — N18.30 ANEMIA IN STAGE 3 CHRONIC KIDNEY DISEASE, UNSPECIFIED WHETHER STAGE 3A OR 3B CKD: ICD-10-CM

## 2023-12-29 DIAGNOSIS — D50.9 IRON DEFICIENCY ANEMIA, UNSPECIFIED IRON DEFICIENCY ANEMIA TYPE: ICD-10-CM

## 2023-12-29 DIAGNOSIS — D63.1 ANEMIA IN CHRONIC KIDNEY DISEASE, UNSPECIFIED CKD STAGE: ICD-10-CM

## 2023-12-29 DIAGNOSIS — N18.9 ANEMIA IN CHRONIC KIDNEY DISEASE, UNSPECIFIED CKD STAGE: ICD-10-CM

## 2023-12-29 DIAGNOSIS — N18.30 ANEMIA IN STAGE 3 CHRONIC KIDNEY DISEASE: Primary | ICD-10-CM

## 2023-12-29 DIAGNOSIS — D63.1 ANEMIA IN STAGE 3 CHRONIC KIDNEY DISEASE: Primary | ICD-10-CM

## 2023-12-29 DIAGNOSIS — Z53.1 REFUSAL OF BLOOD TRANSFUSIONS AS PATIENT IS JEHOVAH'S WITNESS: ICD-10-CM

## 2023-12-29 DIAGNOSIS — D63.1 ANEMIA IN STAGE 3 CHRONIC KIDNEY DISEASE, UNSPECIFIED WHETHER STAGE 3A OR 3B CKD: ICD-10-CM

## 2023-12-29 PROCEDURE — 63600175 PHARM REV CODE 636 W HCPCS: Mod: JZ,EC,JG | Performed by: INTERNAL MEDICINE

## 2023-12-29 PROCEDURE — 96372 THER/PROPH/DIAG INJ SC/IM: CPT

## 2023-12-29 RX ADMIN — EPOETIN ALFA-EPBX 40000 UNITS: 40000 INJECTION, SOLUTION INTRAVENOUS; SUBCUTANEOUS at 12:12

## 2023-12-29 NOTE — PLAN OF CARE
Patient presented to unit for q2w Retacrit 40k injection. VSS. No new or worsening complaints voiced. Labs reviewed. Injection tolerated without difficulty. Patient discharged in NAD.    Problem: Anemia  Goal: Anemia Symptom Improvement  Outcome: Ongoing, Progressing

## 2024-01-10 DIAGNOSIS — D86.9 SARCOIDOSIS: ICD-10-CM

## 2024-01-10 DIAGNOSIS — G72.9 MYOPATHY: ICD-10-CM

## 2024-01-11 RX ORDER — PREDNISONE 1 MG/1
TABLET ORAL
Qty: 120 TABLET | Refills: 0 | Status: SHIPPED | OUTPATIENT
Start: 2024-01-11

## 2024-01-12 ENCOUNTER — INFUSION (OUTPATIENT)
Dept: INFUSION THERAPY | Facility: HOSPITAL | Age: 79
End: 2024-01-12
Attending: INTERNAL MEDICINE
Payer: MEDICARE

## 2024-01-12 VITALS
OXYGEN SATURATION: 96 % | TEMPERATURE: 99 F | HEART RATE: 94 BPM | SYSTOLIC BLOOD PRESSURE: 145 MMHG | RESPIRATION RATE: 18 BRPM | DIASTOLIC BLOOD PRESSURE: 67 MMHG

## 2024-01-12 DIAGNOSIS — N18.30 ANEMIA IN STAGE 3 CHRONIC KIDNEY DISEASE, UNSPECIFIED WHETHER STAGE 3A OR 3B CKD: ICD-10-CM

## 2024-01-12 DIAGNOSIS — N18.9 ANEMIA IN CHRONIC KIDNEY DISEASE, UNSPECIFIED CKD STAGE: ICD-10-CM

## 2024-01-12 DIAGNOSIS — D63.1 ANEMIA IN CHRONIC KIDNEY DISEASE, UNSPECIFIED CKD STAGE: ICD-10-CM

## 2024-01-12 DIAGNOSIS — N18.30 ANEMIA IN STAGE 3 CHRONIC KIDNEY DISEASE: Primary | ICD-10-CM

## 2024-01-12 DIAGNOSIS — D63.1 ANEMIA IN STAGE 3 CHRONIC KIDNEY DISEASE, UNSPECIFIED WHETHER STAGE 3A OR 3B CKD: ICD-10-CM

## 2024-01-12 DIAGNOSIS — D63.1 ANEMIA IN STAGE 3 CHRONIC KIDNEY DISEASE: Primary | ICD-10-CM

## 2024-01-12 DIAGNOSIS — D50.9 IRON DEFICIENCY ANEMIA, UNSPECIFIED IRON DEFICIENCY ANEMIA TYPE: ICD-10-CM

## 2024-01-12 DIAGNOSIS — Z53.1 REFUSAL OF BLOOD TRANSFUSIONS AS PATIENT IS JEHOVAH'S WITNESS: ICD-10-CM

## 2024-01-12 PROCEDURE — 96372 THER/PROPH/DIAG INJ SC/IM: CPT

## 2024-01-12 PROCEDURE — 63600175 PHARM REV CODE 636 W HCPCS: Mod: JZ,EC,JG | Performed by: INTERNAL MEDICINE

## 2024-01-12 RX ADMIN — EPOETIN ALFA-EPBX 40000 UNITS: 40000 INJECTION, SOLUTION INTRAVENOUS; SUBCUTANEOUS at 12:01

## 2024-01-13 NOTE — PLAN OF CARE
Patient arrived to unit for Retacrit 40,000u injection.Labs reviewed, HGB 10.6 today. Plan of care reviewed, patient agreeable to plan. Patient tolerated injection well. VSS. Discharge instructions reviewed, patient instructed to return 1/26. Patient left off unit in scooter accompanied by daughter. Patient in NAD at time of discharge.

## 2024-01-16 ENCOUNTER — PATIENT MESSAGE (OUTPATIENT)
Dept: FAMILY MEDICINE | Facility: CLINIC | Age: 79
End: 2024-01-16
Payer: MEDICARE

## 2024-01-16 DIAGNOSIS — E11.69 COMBINED HYPERLIPIDEMIA ASSOCIATED WITH TYPE 2 DIABETES MELLITUS: Chronic | ICD-10-CM

## 2024-01-16 DIAGNOSIS — G72.9 MYOPATHY: ICD-10-CM

## 2024-01-16 DIAGNOSIS — E78.2 COMBINED HYPERLIPIDEMIA ASSOCIATED WITH TYPE 2 DIABETES MELLITUS: Chronic | ICD-10-CM

## 2024-01-16 DIAGNOSIS — D86.9 SARCOIDOSIS: Chronic | ICD-10-CM

## 2024-01-16 NOTE — TELEPHONE ENCOUNTER
Care Due:                  Date            Visit Type   Department     Provider  --------------------------------------------------------------------------------                                             [facility]     MEDICINE/  Last Visit: 12-      FOLLOW UP    BENEDICT GUERRERO -         [facility]      MEDICINE/  Next Visit: 01-      CARE ([facility])   INTERNAL [place]  Test          Frequency    Reason                     Performed    Due Date  --------------------------------------------------------------------------------    Lipid Panel.  12 months..  atorvastatin, fenofibrate  12- 12-    [facility] Embedded Care Due Messages. Reference number: [id number].   1/16/2024 11:02:17 AM CST

## 2024-01-17 RX ORDER — FOLIC ACID 1 MG/1
1000 TABLET ORAL DAILY
Qty: 90 TABLET | Refills: 1 | Status: SHIPPED | OUTPATIENT
Start: 2024-01-17

## 2024-01-17 RX ORDER — ATORVASTATIN CALCIUM 20 MG/1
20 TABLET, FILM COATED ORAL DAILY
Qty: 90 TABLET | Refills: 1 | Status: SHIPPED | OUTPATIENT
Start: 2024-01-17

## 2024-01-22 ENCOUNTER — OFFICE VISIT (OUTPATIENT)
Dept: FAMILY MEDICINE | Facility: CLINIC | Age: 79
End: 2024-01-22
Payer: MEDICARE

## 2024-01-22 VITALS
RESPIRATION RATE: 16 BRPM | DIASTOLIC BLOOD PRESSURE: 84 MMHG | HEART RATE: 62 BPM | TEMPERATURE: 98 F | BODY MASS INDEX: 23.82 KG/M2 | SYSTOLIC BLOOD PRESSURE: 140 MMHG | OXYGEN SATURATION: 94 % | HEIGHT: 62 IN | WEIGHT: 129.44 LBS

## 2024-01-22 DIAGNOSIS — N18.30 ANEMIA IN STAGE 3 CHRONIC KIDNEY DISEASE, UNSPECIFIED WHETHER STAGE 3A OR 3B CKD: ICD-10-CM

## 2024-01-22 DIAGNOSIS — I10 ESSENTIAL HYPERTENSION: Chronic | ICD-10-CM

## 2024-01-22 DIAGNOSIS — R56.9 SEIZURE: Primary | ICD-10-CM

## 2024-01-22 DIAGNOSIS — Z79.52 IMMUNOSUPPRESSION DUE TO CHRONIC STEROID USE: ICD-10-CM

## 2024-01-22 DIAGNOSIS — R14.0 ABDOMINAL BLOATING: ICD-10-CM

## 2024-01-22 DIAGNOSIS — T38.0X5A IMMUNOSUPPRESSION DUE TO CHRONIC STEROID USE: ICD-10-CM

## 2024-01-22 DIAGNOSIS — D84.821 IMMUNOSUPPRESSION DUE TO CHRONIC STEROID USE: ICD-10-CM

## 2024-01-22 DIAGNOSIS — D63.1 ANEMIA IN STAGE 3 CHRONIC KIDNEY DISEASE, UNSPECIFIED WHETHER STAGE 3A OR 3B CKD: ICD-10-CM

## 2024-01-22 DIAGNOSIS — E11.3292 CONTROLLED TYPE 2 DIABETES MELLITUS WITH LEFT EYE AFFECTED BY MILD NONPROLIFERATIVE RETINOPATHY WITHOUT MACULAR EDEMA, WITHOUT LONG-TERM CURRENT USE OF INSULIN: Chronic | ICD-10-CM

## 2024-01-22 DIAGNOSIS — M46.1 SACROILIITIS: ICD-10-CM

## 2024-01-22 DIAGNOSIS — N18.31 STAGE 3A CHRONIC KIDNEY DISEASE: ICD-10-CM

## 2024-01-22 DIAGNOSIS — Z12.31 ENCOUNTER FOR SCREENING MAMMOGRAM FOR BREAST CANCER: ICD-10-CM

## 2024-01-22 PROCEDURE — 1157F ADVNC CARE PLAN IN RCRD: CPT | Mod: CPTII,S$GLB,, | Performed by: FAMILY MEDICINE

## 2024-01-22 PROCEDURE — 3072F LOW RISK FOR RETINOPATHY: CPT | Mod: CPTII,S$GLB,, | Performed by: FAMILY MEDICINE

## 2024-01-22 PROCEDURE — 1101F PT FALLS ASSESS-DOCD LE1/YR: CPT | Mod: CPTII,S$GLB,, | Performed by: FAMILY MEDICINE

## 2024-01-22 PROCEDURE — 99214 OFFICE O/P EST MOD 30 MIN: CPT | Mod: S$GLB,,, | Performed by: FAMILY MEDICINE

## 2024-01-22 PROCEDURE — 3077F SYST BP >= 140 MM HG: CPT | Mod: CPTII,S$GLB,, | Performed by: FAMILY MEDICINE

## 2024-01-22 PROCEDURE — 99999 PR PBB SHADOW E&M-EST. PATIENT-LVL III: CPT | Mod: PBBFAC,,, | Performed by: FAMILY MEDICINE

## 2024-01-22 PROCEDURE — 1125F AMNT PAIN NOTED PAIN PRSNT: CPT | Mod: CPTII,S$GLB,, | Performed by: FAMILY MEDICINE

## 2024-01-22 PROCEDURE — 3288F FALL RISK ASSESSMENT DOCD: CPT | Mod: CPTII,S$GLB,, | Performed by: FAMILY MEDICINE

## 2024-01-22 PROCEDURE — 3079F DIAST BP 80-89 MM HG: CPT | Mod: CPTII,S$GLB,, | Performed by: FAMILY MEDICINE

## 2024-01-22 NOTE — PATIENT INSTRUCTIONS
Please call the number below to make your appointment    Dear Mrs. Lawrence,     I am reaching to you in reference to a refill request that we have received for your levetiracetam and I see that you last had a visit with Dr. Murcia on 7/27/2021. Unfortunately he is no longer with [facility] and you will need to establish care with another one of our providers. The next available appointment in this office is in late February. I can get you scheduled for an appointment, please let me know as soon as possible what day and time would work best for you.        Sincerely,     Madelyn Cotton, CMA [facility]  Phone: [phone]  Fax: [phone]

## 2024-01-22 NOTE — PROGRESS NOTES
Chief Complaint   Patient presents with    Seizures       HPI  Regino Lawrence is a 78 y.o. female with multiple medical diagnoses as listed in the medical history and problem list that presents for follow-up for seizure disorder and diabetes    Seizure d/o- had another seizure Jan 16, has not set up neuro appt but msg is in mychart for her to call and schedule; she does notice her bg are running as high as 200 but not as high as they were before  Bloating- she had a bowel movement yesterday, she is taking the linzess but she didn't take it this morning  Sarcoidosis- she is taking prednisone 4mg prescribed by her rheumatologist        ALLERGIES AND MEDICATIONS: updated and reviewed.  Review of patient's allergies indicates:   Allergen Reactions    Azathioprine Shortness Of Breath and Other (See Comments)     Fatigue     Medication List with Changes/Refills   Current Medications    ACCU-CHEK FASTCLIX LANCING DEV KIT    USE AS DIRECTED.    ALBUTEROL-IPRATROPIUM (DUO-NEB) 2.5 MG-0.5 MG/3 ML NEBULIZER SOLUTION    Take 3 mLs by nebulization every 4 (four) hours as needed for Wheezing or Shortness of Breath. Rescue    ALCOHOL ANTISEPTIC PADS (ALCOHOL PREP PADS TOP)        APIXABAN (ELIQUIS) 5 MG TAB    Take 1 tablet (5 mg total) by mouth 2 (two) times daily. Start this prescription after finishing starter pack    ATORVASTATIN (LIPITOR) 20 MG TABLET    Take 1 tablet (20 mg total) by mouth once daily.    BLOOD SUGAR DIAGNOSTIC (ACCU-CHEK SMARTVIEW TEST STRIP) STRP    Use as directed to check blood sugar twice daily.    BLOOD SUGAR DIAGNOSTIC STRP    To check BG three times daily, to use with insurance preferred meter    BLOOD-GLUCOSE METER KIT    Use as instructed    CALCIUM CARBONATE (OS-MALCOLM) 600 MG CALCIUM (1,500 MG) TAB    Take 1 tablet by mouth 2 (two) times daily.     CARVEDILOL (COREG) 25 MG TABLET    Take 1 tablet (25 mg total) by mouth 2 (two) times daily.    CETIRIZINE (ZYRTEC) 10 MG TABLET    Take 1 tablet (10 mg  "total) by mouth once daily.    DICLOFENAC SODIUM (VOLTAREN) 1 % GEL    Apply 2 g topically once daily.    DULOXETINE (CYMBALTA) 30 MG CAPSULE    Take 1 capsule (30 mg total) by mouth once daily.    EPOETIN WOLFGANG-EPBX (RETACRIT) 10,000 UNIT/ML IMJECTION    Inject 20,000 Units into the skin every 14 (fourteen) days.    FENOFIBRATE 160 MG TAB    Take 1 tablet (160 mg total) by mouth once daily.    FLUTICASONE PROPION-SALMETEROL 115-21 MCG/DOSE (ADVAIR HFA) 115-21 MCG/ACTUATION HFAA INHALER    Inhale 2 puffs into the lungs every 12 (twelve) hours. Controller    FOLIC ACID (FOLVITE) 1 MG TABLET    Take 1 tablet (1,000 mcg total) by mouth once daily.    HYDRALAZINE (APRESOLINE) 25 MG TABLET    Take 2 tabs every 8 hours by mouth. Check BP 2 hours after each dose and if Blood pressure >160- please take 1 extra tab    INSULIN (LANTUS SOLOSTAR U-100 INSULIN) GLARGINE 100 UNITS/ML SUBQ PEN    Inject 10 Units into the skin every evening.    IGLESIAGA KETOPROFEN 10% LIDOCAINE 10% GABAPENTIN 6% AMITRIPTYLINE 2% IN TRANSDERMAL CREAM    Apply 1 to 2 grams 3 to 4 times daily    LEVETIRACETAM (KEPPRA) 500 MG TAB    Take 1 tablet (500 mg total) by mouth 2 (two) times daily.    LEVOTHYROXINE (SYNTHROID) 50 MCG TABLET    Take 1 tablet (50 mcg total) by mouth before breakfast.    NIFEDIPINE (PROCARDIA-XL) 90 MG (OSM) 24 HR TABLET    Take 1 tablet (90 mg total) by mouth once daily.    OLOPATADINE (PATANOL) 0.1 % OPHTHALMIC SOLUTION    Place 1 drop into both eyes daily as needed for Allergies.    PANTOPRAZOLE (PROTONIX) 40 MG TABLET    Take 1 tablet (40 mg total) by mouth once daily.    PEN NEEDLE, DIABETIC (NOVOFINE 32) 32 GAUGE X 1/4" NDLE    For use with [employer] pen .    PREDNISONE (DELTASONE) 1 MG TABLET    4 tabs po daily for 1 month then decrease by 1 tab every month if labs allow    TIZANIDINE (ZANAFLEX) 2 MG TABLET    Take 2 tablets (4 mg total) by mouth every 8 (eight) hours as needed (muscle spasm).   Discontinued Medications    " "PREDNISONE (DELTASONE) 5 MG TABLET    5 mg daily as needed for pain       ROS  Review of Systems   Constitutional:  Negative for chills, diaphoresis, fatigue, fever and unexpected weight change.   HENT:  Negative for rhinorrhea, sinus pressure, sore throat and tinnitus.    Eyes:  Negative for photophobia and visual disturbance.   Respiratory:  Negative for cough, shortness of breath and wheezing.    Cardiovascular:  Negative for chest pain and palpitations.   Gastrointestinal:  Positive for abdominal distention and constipation. Negative for abdominal pain, blood in stool, diarrhea, nausea and vomiting.   Genitourinary:  Negative for dysuria, flank pain, frequency and vaginal discharge.   Musculoskeletal:  Positive for arthralgias. Negative for joint swelling.   Skin:  Negative for rash.   Neurological:  Negative for speech difficulty, weakness, light-headedness and headaches.   Psychiatric/Behavioral:  Negative for behavioral problems and dysphoric mood.        Physical Exam  Vitals:    01/22/24 1526   BP: (!) 140/84   Pulse: 62   Resp: 16   Temp: 98 °F (36.7 °C)   TempSrc: Oral   SpO2: (!) 94%   Weight: 58.7 kg (129 lb 6.6 oz)   Height: 5' 2" (1.575 m)    Body mass index is 23.67 kg/m².  Weight: 58.7 kg (129 lb 6.6 oz)   Height: 5' 2" (157.5 cm)     Physical Exam  Vitals and nursing note reviewed.   Constitutional:       Appearance: She is well-developed.   Abdominal:      General: Bowel sounds are normal. There is distension.      Palpations: There is no mass.      Tenderness: There is abdominal tenderness.      Hernia: No hernia is present.   Skin:     General: Skin is warm and dry.      Findings: No erythema or rash.   Neurological:      Mental Status: She is alert. Mental status is at baseline.   Psychiatric:         Behavior: Behavior normal.         Health Maintenance         Date Due Completion Date    RSV Vaccine (Age 60+ and Pregnant patients) (1 - 1-dose 60+ series) Never done ---    Shingles Vaccine (1 " of 2) 07/20/2015 5/25/2015    Mammogram 09/14/2022 9/14/2020    Lipid Panel 12/09/2023 12/9/2022    COVID-19 Vaccine (6 - 2023-24 season) 12/29/2023 11/3/2023    Hemoglobin A1c 06/18/2024 12/18/2023    Diabetes Urine Screening 09/08/2024 9/8/2023    Eye Exam 10/25/2024 10/25/2023    DEXA Scan 11/07/2024 11/7/2022    TETANUS VACCINE 05/16/2026 5/16/2016            Health maintenance reviewed and addressed as ordered      ASSESSMENT/PLAN       1. Seizure  F/u with neuro for evaluation    2. Abdominal bloating  Suspect constipation, may need to increase linzess   - X-Ray Abdomen AP 1 View; Future    3. Essential hypertension  Elevated due to pain    4. Anemia in stage 3 chronic kidney disease, unspecified whether stage 3a or 3b CKD  monitor with routine labs    5. 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 Immunosuppression due to chronic steroid use  Due for f/u with rheumatologist    7. Stage 3a chronic kidney disease  monitor with routine labs      8. Sacroiliitis  Has f/u with pain mgmt    9. Encounter for screening mammogram for breast cancer  as ordered     - Mammo Digital Screening Bilat w/ Abdoul; Future        Micaela Mendoza MD  01/22/2024 3:37 PM        Follow up in about 3 months (around 4/22/2024).    Orders Placed This Encounter   Procedures    X-Ray Abdomen AP 1 View    Mammo Digital Screening Bilat w/ Abdoul    Hemoglobin A1C

## 2024-01-29 ENCOUNTER — TELEPHONE (OUTPATIENT)
Dept: FAMILY MEDICINE | Facility: CLINIC | Age: 79
End: 2024-01-29
Payer: MEDICARE

## 2024-01-29 NOTE — TELEPHONE ENCOUNTER
----- Message from Bandar Santamaria Jr., MD sent at 1/27/2024  1:41 AM CST -----  Please let patient know that Xray of Abdomen showed no acute concerns.

## 2024-02-01 ENCOUNTER — OFFICE VISIT (OUTPATIENT)
Dept: RHEUMATOLOGY | Facility: CLINIC | Age: 79
End: 2024-02-01
Payer: MEDICARE

## 2024-02-01 ENCOUNTER — LAB VISIT (OUTPATIENT)
Dept: LAB | Facility: HOSPITAL | Age: 79
End: 2024-02-01
Attending: INTERNAL MEDICINE
Payer: MEDICARE

## 2024-02-01 ENCOUNTER — PATIENT MESSAGE (OUTPATIENT)
Dept: RHEUMATOLOGY | Facility: CLINIC | Age: 79
End: 2024-02-01
Payer: MEDICARE

## 2024-02-01 VITALS
WEIGHT: 129 LBS | SYSTOLIC BLOOD PRESSURE: 157 MMHG | HEART RATE: 91 BPM | DIASTOLIC BLOOD PRESSURE: 85 MMHG | HEIGHT: 62 IN | BODY MASS INDEX: 23.74 KG/M2

## 2024-02-01 DIAGNOSIS — G72.9 MYOPATHY: ICD-10-CM

## 2024-02-01 DIAGNOSIS — D86.86 SARCOID ARTHROPATHY: ICD-10-CM

## 2024-02-01 DIAGNOSIS — M79.10 MYALGIA: ICD-10-CM

## 2024-02-01 DIAGNOSIS — R53.83 FATIGUE, UNSPECIFIED TYPE: ICD-10-CM

## 2024-02-01 DIAGNOSIS — D86.9 SARCOIDOSIS: Primary | ICD-10-CM

## 2024-02-01 DIAGNOSIS — D86.9 SARCOIDOSIS: ICD-10-CM

## 2024-02-01 LAB
ALBUMIN SERPL BCP-MCNC: 3.7 G/DL (ref 3.5–5.2)
ALP SERPL-CCNC: 54 U/L (ref 55–135)
ALT SERPL W/O P-5'-P-CCNC: 17 U/L (ref 10–44)
ANION GAP SERPL CALC-SCNC: 8 MMOL/L (ref 8–16)
AST SERPL-CCNC: 20 U/L (ref 10–40)
BILIRUB SERPL-MCNC: 0.5 MG/DL (ref 0.1–1)
BUN SERPL-MCNC: 15 MG/DL (ref 8–23)
CALCIUM SERPL-MCNC: 8.9 MG/DL (ref 8.7–10.5)
CHLORIDE SERPL-SCNC: 101 MMOL/L (ref 95–110)
CK SERPL-CCNC: 226 U/L (ref 20–180)
CO2 SERPL-SCNC: 26 MMOL/L (ref 23–29)
CREAT SERPL-MCNC: 1.1 MG/DL (ref 0.5–1.4)
CRP SERPL-MCNC: 5.9 MG/L (ref 0–8.2)
ERYTHROCYTE [SEDIMENTATION RATE] IN BLOOD BY PHOTOMETRIC METHOD: 7 MM/HR (ref 0–36)
EST. GFR  (NO RACE VARIABLE): 51.4 ML/MIN/1.73 M^2
GLUCOSE SERPL-MCNC: 124 MG/DL (ref 70–110)
POTASSIUM SERPL-SCNC: 3.4 MMOL/L (ref 3.5–5.1)
PROT SERPL-MCNC: 7 G/DL (ref 6–8.4)
SODIUM SERPL-SCNC: 135 MMOL/L (ref 136–145)

## 2024-02-01 PROCEDURE — 3077F SYST BP >= 140 MM HG: CPT | Mod: CPTII,S$GLB,, | Performed by: INTERNAL MEDICINE

## 2024-02-01 PROCEDURE — 99214 OFFICE O/P EST MOD 30 MIN: CPT | Mod: 25,S$GLB,, | Performed by: INTERNAL MEDICINE

## 2024-02-01 PROCEDURE — 1157F ADVNC CARE PLAN IN RCRD: CPT | Mod: CPTII,S$GLB,, | Performed by: INTERNAL MEDICINE

## 2024-02-01 PROCEDURE — 85652 RBC SED RATE AUTOMATED: CPT | Performed by: INTERNAL MEDICINE

## 2024-02-01 PROCEDURE — 1101F PT FALLS ASSESS-DOCD LE1/YR: CPT | Mod: CPTII,S$GLB,, | Performed by: INTERNAL MEDICINE

## 2024-02-01 PROCEDURE — 82085 ASSAY OF ALDOLASE: CPT | Performed by: INTERNAL MEDICINE

## 2024-02-01 PROCEDURE — 3072F LOW RISK FOR RETINOPATHY: CPT | Mod: CPTII,S$GLB,, | Performed by: INTERNAL MEDICINE

## 2024-02-01 PROCEDURE — 36415 COLL VENOUS BLD VENIPUNCTURE: CPT | Performed by: INTERNAL MEDICINE

## 2024-02-01 PROCEDURE — 80053 COMPREHEN METABOLIC PANEL: CPT | Performed by: INTERNAL MEDICINE

## 2024-02-01 PROCEDURE — 3288F FALL RISK ASSESSMENT DOCD: CPT | Mod: CPTII,S$GLB,, | Performed by: INTERNAL MEDICINE

## 2024-02-01 PROCEDURE — 3079F DIAST BP 80-89 MM HG: CPT | Mod: CPTII,S$GLB,, | Performed by: INTERNAL MEDICINE

## 2024-02-01 PROCEDURE — 86140 C-REACTIVE PROTEIN: CPT | Performed by: INTERNAL MEDICINE

## 2024-02-01 PROCEDURE — 99999 PR PBB SHADOW E&M-EST. PATIENT-LVL II: CPT | Mod: PBBFAC,,, | Performed by: INTERNAL MEDICINE

## 2024-02-01 PROCEDURE — 96372 THER/PROPH/DIAG INJ SC/IM: CPT | Mod: S$GLB,,, | Performed by: INTERNAL MEDICINE

## 2024-02-01 PROCEDURE — 82550 ASSAY OF CK (CPK): CPT | Performed by: INTERNAL MEDICINE

## 2024-02-01 PROCEDURE — 1125F AMNT PAIN NOTED PAIN PRSNT: CPT | Mod: CPTII,S$GLB,, | Performed by: INTERNAL MEDICINE

## 2024-02-01 RX ORDER — TRIAMCINOLONE ACETONIDE 40 MG/ML
80 INJECTION, SUSPENSION INTRA-ARTICULAR; INTRAMUSCULAR ONCE
Status: COMPLETED | OUTPATIENT
Start: 2024-02-01 | End: 2024-02-01

## 2024-02-01 RX ADMIN — TRIAMCINOLONE ACETONIDE 80 MG: 40 INJECTION, SUSPENSION INTRA-ARTICULAR; INTRAMUSCULAR at 10:02

## 2024-02-01 ASSESSMENT — ROUTINE ASSESSMENT OF PATIENT INDEX DATA (RAPID3)
MDHAQ FUNCTION SCORE: 2.7
PSYCHOLOGICAL DISTRESS SCORE: 2.2
TOTAL RAPID3 SCORE: 9.66
AM STIFFNESS SCORE: 1, YES
WHEN YOU AWAKENED IN THE MORNING OVER THE LAST WEEK, PLEASE INDICATE THE AMOUNT OF TIME IT TAKES UNTIL YOU ARE AS LIMBER AS YOU WILL BE FOR THE DAY: 15 MIN
FATIGUE SCORE: 8
PAIN SCORE: 10
PATIENT GLOBAL ASSESSMENT SCORE: 10

## 2024-02-01 NOTE — PROGRESS NOTES
Subjective:       Patient ID: Regino Lawrence is a 78 y.o. female.    Chief Complaint: Sarcoidosis    HPI:  Regino Lawrence is a 78 y.o. female with history of sarcoidosis with associated myopathy and   arthropathy. Sarcoidosis that was first manifested by muscle inflammation, low white   blood cell count, hair loss, skin involvement. She was treated in the   past with methotrexate and Plaquenil, both of which were ineffective.   Cellcept and Imuran caused some unknown side effect (she thinks it made her sick).   Colchicine was held due to low WBC.   Although methotrexate did not help in past it was retried and she felt it helped hair growth but did not help body aches.   She held MTX due to an URI but patient has not wanted restarted since then (2013).   Leflunomide was held due to elevated BP after less than a week of use.  July 2021 hospitalized after passing out at home and fracturing L4 .  She was thought to have a seizure. During hospitalization found to have bilateral PE and was placed on Eliquis.      Interval History:   Now with flare of muscle and joint pains.   Had been on prednisone 4 mg daily since last visit.    Difficulty sleeping due to pain.  Last steroid injection July 2023 helped for 3-4 months.   Pain 10/10 ache in entire body.  Activity worsens pain.  Steroid helps very little currently.  Patient requesting steroid injection.         Review of Systems   Constitutional:  Positive for fatigue. Negative for fever and unexpected weight change.   HENT:  Negative for mouth sores and trouble swallowing.         Dry mouth   Eyes:  Negative for redness.        Dry eyes   Respiratory: Negative.  Negative for cough and shortness of breath.    Cardiovascular: Negative.  Negative for chest pain.   Gastrointestinal: Negative.  Negative for constipation and diarrhea.   Endocrine: Negative.    Genitourinary: Negative.  Negative for dysuria and genital sores.   Musculoskeletal:  Positive for arthralgias, back  "pain, joint swelling and myalgias.   Skin: Negative.  Negative for rash.   Allergic/Immunologic: Negative.    Neurological: Negative.  Negative for headaches.   Hematological: Negative.  Does not bruise/bleed easily.   Psychiatric/Behavioral: Negative.           Objective:   BP (!) 157/85   Pulse 91   Ht 5' 2" (1.575 m)   Wt 58.5 kg (129 lb)   LMP  (LMP Unknown)   BMI 23.59 kg/m²   Pulse Ox 98% on RA     Physical Exam   Constitutional: She is oriented to person, place, and time.   HENT:   Head: Normocephalic and atraumatic.   Eyes: Conjunctivae are normal. Right eye discharge: kenalog.   Cardiovascular: Normal rate, regular rhythm and normal heart sounds.   Pulmonary/Chest: Effort normal and breath sounds normal.   Abdominal: Soft. Bowel sounds are normal.   Musculoskeletal:      Comments: Pain palpation of musculature of arms and legs diffusely  28 joint count: 28 tender (MCPs, PIPs, knees and shoulders) and 0 swollen     No pain on compression of MTPs        Neurological: She is alert and oriented to person, place, and time.   With cane   Skin: Skin is warm and dry.   Psychiatric: Mood and affect normal.     LABS        Component      Latest Ref Rng 12/18/2023 1/26/2024   WBC      3.90 - 12.70 K/uL  4.42    RBC      4.00 - 5.40 M/uL  3.44 (L)    Hemoglobin      12.0 - 16.0 g/dL  11.4 (L)    Hematocrit      37.0 - 48.5 %  35.4 (L)    MCV      82 - 98 fL  103 (H)    MCH      27.0 - 31.0 pg  33.1 (H)    MCHC      32.0 - 36.0 g/dL  32.2    RDW      11.5 - 14.5 %  13.4    Platelet Count      150 - 450 K/uL  305    MPV      9.2 - 12.9 fL  8.7 (L)    Immature Granulocytes      0.0 - 0.5 %  0.2    Gran # (ANC)      1.8 - 7.7 K/uL  2.7    Immature Grans (Abs)      0.00 - 0.04 K/uL  0.01    Lymph #      1.0 - 4.8 K/uL  1.3    Mono #      0.3 - 1.0 K/uL  0.4    Eos #      0.0 - 0.5 K/uL  0.1    Baso #      0.00 - 0.20 K/uL  0.02    nRBC      0 /100 WBC  0    Gran %      38.0 - 73.0 %  60.1    Lymph %      18.0 - 48.0 %  " 29.2    Mono %      4.0 - 15.0 %  8.4    Eosinophil %      0.0 - 8.0 %  1.6    Basophil %      0.0 - 1.9 %  0.5    Differential Method  Automated    Sodium      136 - 145 mmol/L 138     Potassium      3.5 - 5.1 mmol/L 4.1     Chloride      95 - 110 mmol/L 101     CO2      23 - 29 mmol/L 24     Glucose      70 - 110 mg/dL 91     BUN      8 - 23 mg/dL 19     Creatinine      0.5 - 1.4 mg/dL 1.5 (H)     Calcium      8.7 - 10.5 mg/dL 9.2     PROTEIN TOTAL      6.0 - 8.4 g/dL 7.7     Albumin      3.5 - 5.2 g/dL 4.0     BILIRUBIN TOTAL      0.1 - 1.0 mg/dL 0.4     ALP      55 - 135 U/L 54 (L)     AST      10 - 40 U/L 18     ALT      10 - 44 U/L 15     eGFR      >60 mL/min/1.73 m^2 35 !     Anion Gap      8 - 16 mmol/L 13     Sed Rate      0 - 20 mm/Hr  10    CRP      0.0 - 8.2 mg/L  9.1 (H)       Legend:  (H) High  (L) Low  ! Abnormal       Assessment:       1.   Sarcoidosis. Manifested by myopathy and arthropathy.  Persistent joint pain and myalgias despite prednisone 5 mg. Unable to tolerate immunosuppressants/steroid sparing agents for various reasons. Now with muscle spasms and all over body pain.   2.   Myalgia and myositis.  History of fibromyalgia   3.   Osteopenia. Took Fosamax for 5 years stopped 6/2013.  Awaiting patient decision on Prolia after she sees dentist (she has not been able to go).    4.   Fatigue     5.   Diabetes mellitus type 2 in nonobese.    6.           Neck pain. X-ray with degenerative changes. S/p laminectomy-cervical fusion C3-C7 11/16/2015 for cervical spinal stenosis.    7.           Back pain    8.           HTN.  Patient suspects BP elevated due to pain  9.           SOB.  When blood count low.   10.         Anemia.  On Procrit  11.         Seizures.  New onset July 2021.  On chronic medication.  12.         Bilateral PE    Plan:       1. Labs   2. Kenalog 80 mg IM.  Continue prednisone 4 mg daily.  Risk of elevated BP and BG discussed.  3. [employer] stopped tramadol.  Still off  hydrocodone/acetaminophen from primary doctor.  [employer] stopped Flexeril so switched to tizanidine.  Tizanidine stopped after seizure but was restarted without any issues.   Continues to take seizure medication.  4. Following with nephrology  5. DXA with osteopenia of hip total and femoral neck. FRAX does not suggest treatment however if prednisone goes to 7.5 mg or more will consider Prolia (due renal insufficiency).  Information provided for patient to review.  She read information but did not see dentist to have teeth pulled.  6.  Follow with Dr. Conner regarding spine issues, fracture and pain in neck.  7.  Had COVID vaccine and booster                 RTO in 4 months.

## 2024-02-02 ENCOUNTER — OFFICE VISIT (OUTPATIENT)
Dept: PAIN MEDICINE | Facility: CLINIC | Age: 79
End: 2024-02-02
Payer: MEDICARE

## 2024-02-02 ENCOUNTER — TELEPHONE (OUTPATIENT)
Dept: FAMILY MEDICINE | Facility: CLINIC | Age: 79
End: 2024-02-02
Payer: MEDICARE

## 2024-02-02 VITALS
RESPIRATION RATE: 18 BRPM | SYSTOLIC BLOOD PRESSURE: 188 MMHG | HEART RATE: 95 BPM | DIASTOLIC BLOOD PRESSURE: 84 MMHG | OXYGEN SATURATION: 98 %

## 2024-02-02 DIAGNOSIS — M54.2 NECK PAIN: ICD-10-CM

## 2024-02-02 DIAGNOSIS — G89.4 CHRONIC PAIN SYNDROME: ICD-10-CM

## 2024-02-02 DIAGNOSIS — M54.16 LUMBAR RADICULOPATHY: ICD-10-CM

## 2024-02-02 DIAGNOSIS — M25.552 LEFT HIP PAIN: ICD-10-CM

## 2024-02-02 DIAGNOSIS — M51.36 DEGENERATIVE DISC DISEASE, LUMBAR: Primary | ICD-10-CM

## 2024-02-02 DIAGNOSIS — D86.9 SARCOIDOSIS: Chronic | ICD-10-CM

## 2024-02-02 LAB — ALDOLASE SERPL-CCNC: 3 U/L (ref 1.2–7.6)

## 2024-02-02 PROCEDURE — 3077F SYST BP >= 140 MM HG: CPT | Mod: CPTII,S$GLB,, | Performed by: PAIN MEDICINE

## 2024-02-02 PROCEDURE — 1157F ADVNC CARE PLAN IN RCRD: CPT | Mod: CPTII,S$GLB,, | Performed by: PAIN MEDICINE

## 2024-02-02 PROCEDURE — 1159F MED LIST DOCD IN RCRD: CPT | Mod: CPTII,S$GLB,, | Performed by: PAIN MEDICINE

## 2024-02-02 PROCEDURE — 99999 PR PBB SHADOW E&M-EST. PATIENT-LVL V: CPT | Mod: PBBFAC,,, | Performed by: PAIN MEDICINE

## 2024-02-02 PROCEDURE — 3288F FALL RISK ASSESSMENT DOCD: CPT | Mod: CPTII,S$GLB,, | Performed by: PAIN MEDICINE

## 2024-02-02 PROCEDURE — 1125F AMNT PAIN NOTED PAIN PRSNT: CPT | Mod: CPTII,S$GLB,, | Performed by: PAIN MEDICINE

## 2024-02-02 PROCEDURE — 1160F RVW MEDS BY RX/DR IN RCRD: CPT | Mod: CPTII,S$GLB,, | Performed by: PAIN MEDICINE

## 2024-02-02 PROCEDURE — 3072F LOW RISK FOR RETINOPATHY: CPT | Mod: CPTII,S$GLB,, | Performed by: PAIN MEDICINE

## 2024-02-02 PROCEDURE — 99214 OFFICE O/P EST MOD 30 MIN: CPT | Mod: S$GLB,,, | Performed by: PAIN MEDICINE

## 2024-02-02 PROCEDURE — 3079F DIAST BP 80-89 MM HG: CPT | Mod: CPTII,S$GLB,, | Performed by: PAIN MEDICINE

## 2024-02-02 PROCEDURE — 1101F PT FALLS ASSESS-DOCD LE1/YR: CPT | Mod: CPTII,S$GLB,, | Performed by: PAIN MEDICINE

## 2024-02-02 NOTE — TELEPHONE ENCOUNTER
Spoke with patient in regards to lab/test results,verbalized understanding. Instructed to contact office with any questions or concerns.

## 2024-02-02 NOTE — PROGRESS NOTES
Subjective:     Patient ID: Regino Lawrence is a 78 y.o. female    Chief Complaint: Low-back Pain (Bilateral achy pain)      Referred by: Micaela Mendoza MD      HPI:    Initial Encounter (2/2/24):  Regino Lawrence is a 78 y.o. female who presents today with fairly widespread pain related to diffuse osteoarthritis and sarcoidosis.  Has focal low back pain.  Pain is located the midline lower lumbar region.  Pain does extend into the lower extremities.  Patient has been taking oral steroids chronically for her sarcoidosis.  Has also undergone intramuscular steroid injections periodically.  Most recently underwent lumbar medial branch blocks done at [facility].  Patient states that this is a very painful procedure and she has not interested in any additional lumbar medial branch blocks or radiofrequency ablations.  States that overall her pain is unchanged in quality location since last evaluation by [facility] pain management.   This pain is described in detail below.    Physical Therapy:  Yes.    Non-pharmacologic Treatment:  Rest helps         TENS?  No    Pain Medications:         Currently taking:  Voltaren gel, Cymbalta, tizanidine, prednisone    Has tried in the past:  NSAIDs, Tylenol, gabapentin    Has not tried:  Opioids, TCAs    Blood thinners:  Eliquis    Interventional Therapies:   10/8/2018- Left L5 TF RHIANNA  12/6/2018- Bilateral L5 TF RHIANNA  3/21/2019- Left hip and GTB injection  7/22/2019- Left hip joint injection  9/12/2019- Left hip and GTB injection   12/15/2019- Bilateral L5 TF RHIANNA  6/29/2020- Bilateral L5/S1 TF RHIANNA  7/27/2020- Left hip and GTB injections  9/3/2020- Left SI joint injection   11/19/2020- Left femoral and obturator nerve block  10/9/23 - bilateral L3, L4, L5 medial branch blocks - no relief noted; exacerbated pain    Relevant Surgeries:  None    Affecting sleep?  Yes    Affecting daily activities? yes    Depressive symptoms? No          SI/HI? No    Work status: Retired    Pain  Scores:    Best:       10/10  Worst:     10/10  Usually:   10/10  Today:    10/10    Pain Disability Index  Family/Home Responsibilities:: 10  Recreation:: 10  Social Activity:: 10  Occupation:: 10  Sexual Behavior:: 10  Self Care:: 10  Life-Support Activities:: 10  Pain Disability Index (PDI): 70    Review of Systems   Constitutional:  Negative for activity change, appetite change, chills, fatigue, fever and unexpected weight change.   HENT:  Negative for hearing loss.    Eyes:  Negative for visual disturbance.   Respiratory:  Negative for chest tightness and shortness of breath.    Cardiovascular:  Negative for chest pain.   Gastrointestinal:  Negative for abdominal pain, constipation, diarrhea, nausea and vomiting.   Genitourinary:  Negative for difficulty urinating.   Musculoskeletal:  Positive for arthralgias, back pain, gait problem, joint swelling, myalgias, neck pain and neck stiffness.   Skin:  Negative for rash.   Neurological:  Positive for weakness. Negative for dizziness, light-headedness, numbness and headaches.   Psychiatric/Behavioral:  Positive for sleep disturbance. Negative for hallucinations and suicidal ideas. The patient is not nervous/anxious.        Past Medical History:   Diagnosis Date    Acid reflux     Allergy     Alopecia     Anemia     Anemia in CKD (chronic kidney disease) 09/22/2016    Anxiety     Arthritis     Back pain     Cataract     Chronic kidney disease     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, without long-term current use of insulin     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, without long-term current use of insulin     Depression     Diabetes mellitus, type 2     Eye injury as a child     k-abrasion  od    Hyperlipidemia     Hypertension     Hypothyroidism     Immune deficiency disorder     Immune disorder     LOC (loss of consciousness) 03/12/2021    at home    Myalgia  and myositis 2012    Osteoporosis     Polyneuropathy     Pulmonary embolism 07/10/2021    Renal manifestation of secondary diabetes mellitus     Sarcoidosis     Seizure     Type 2 diabetes mellitus     Ulcer     no cancer    Urinary incontinence        Past Surgical History:   Procedure Laterality Date    CARPAL TUNNEL RELEASE      Rt wrist    CATARACT EXTRACTION W/  INTRAOCULAR LENS IMPLANT Right 2015    Dr. Azevedo    CATARACT EXTRACTION W/  INTRAOCULAR LENS IMPLANT Left 2015    Dr. Azevedo    CERVICAL SPINE SURGERY       SECTION      CHOLECYSTECTOMY      INJECTION OF ANESTHETIC AGENT AROUND NERVE Left 2020    Procedure: BLOCK, NERVE LEFT FEMORAL AND OBTURATOR;  Surgeon: Alfonso Richards MD;  Location: [facility], [place], operated by [facility];  Service: Pain Management;  Laterality: Left;  NEEDS CONSENT    INJECTION OF ANESTHETIC AGENT AROUND NERVE Bilateral 10/09/2023    Procedure: BLOCK, NERVE, BILATERAL L3-L4-L5 MEDIAL BRANCH;  Surgeon: Alfonso Richards MD;  Location: [facility], [place], operated by [facility];  Service: Pain Management;  Laterality: Bilateral;    INJECTION OF JOINT Left 2019    Procedure: Injection, Joint  fLUOROSCOPIC jOINT iNJECTION (hIP iNJECTION) LEFT ROCH BURSA AS WELL LEFT TROCHANTERIC BURSA;  Surgeon: Alfonso Richards MD;  Location: [facility], [place], operated by [facility];  Service: Pain Management;  Laterality: Left;  NEEDS CONSENT, DIABETIC    INJECTION OF JOINT Left 2019    Procedure: Injection, Joint FLUOROSCOPIC JOINT INJECTION (HIP INJECTION) LEFT HIP;  Surgeon: Alfonso Richards MD;  Location: [facility], [place], operated by [facility];  Service: Pain Management;  Laterality: Left;  NEEDS CONSENT    INJECTION OF JOINT Left 2019    Procedure: INJECTION, JOINT;  Surgeon: Alfonso Richards MD;  Location: [facility], [place], operated by [facility];  Service: Pain Management;  Laterality: Left;  Left Hip and Left GTB Injections    INJECTION OF JOINT Left 2020    Procedure: INJECTION, JOINT, LEFT HIP and LEFT GREATER TROCHANTERIC BURSA;  Surgeon: Alfonso Richards MD;  Location: [facility], [place], operated by [facility]  PAIN [facility]GT;  Service: Pain Management;  Laterality: Left;  INJECTION, JOINT, LEFT HIP and LEFT GREATER TROCHANTERIC BURSA    INJECTION OF JOINT Left 09/03/2020    Procedure: INJECTION, JOINT, LEFT SI;  Surgeon: Alfonso Richards MD;  Location: [facility];  Service: Pain Management;  Laterality: Left;  INJECTION, JOINT, LEFT SI    TRANSFORAMINAL EPIDURAL INJECTION OF STEROID Bilateral 12/05/2019    Procedure: INJECTION, STEROID, EPIDURAL, TRANSFORAMINAL APPROACH;  Surgeon: Alfonso Richards MD;  Location: [facility];  Service: Pain Management;  Laterality: Bilateral;  B/L TF RHIANNA L5  Consent Needed    TRANSFORAMINAL EPIDURAL INJECTION OF STEROID Bilateral 06/29/2020    Procedure: INJECTION, STEROID, EPIDURAL, TRANSFORAMINAL APPROACH L5/S1;  Surgeon: Alfonso Richards MD;  Location: [facility];  Service: Pain Management;  Laterality: Bilateral;  B/L TF RHIANNA L5/S1    TUBAL LIGATION         Social History     Socioeconomic History    Marital status:      Spouse name: Gasper     Number of children: 5    Years of education: Business school after high school    Highest education level: 12th grade   Tobacco Use    Smoking status: Never     Passive exposure: Never    Smokeless tobacco: Never   Substance and Sexual Activity    Alcohol use: No    Drug use: No    Sexual activity: Not Currently     Partners: Male     Social Determinants of Health     Financial Resource Strain: Patient Declined (12/17/2023)    Overall Financial Resource Strain (CARDIA)     Difficulty of Paying Living Expenses: Patient declined   Food Insecurity: Patient Declined (12/17/2023)    Hunger Vital Sign     Worried About Running Out of Food in the Last Year: Patient declined     Ran Out of Food in the Last Year: Patient declined   Transportation Needs: No Transportation Needs (12/17/2023)    PRAPARE - Transportation     Lack of Transportation (Medical): No     Lack of Transportation (Non-Medical): No   Physical Activity: Inactive (12/17/2023)    Exercise  Vital Sign     Days of Exercise per Week: 0 days     Minutes of Exercise per Session: 0 min   Stress: Patient Declined (12/17/2023)    [employer] - Occupational Stress Questionnaire     Feeling of Stress : Patient declined   Social Connections: Unknown (12/17/2023)    Social Connection and Isolation Panel [NHANES]     Frequency of Communication with Friends and Family: Patient declined     Frequency of Social Gatherings with Friends and Family: Patient declined     Attends Club or Organization Meetings: Patient declined     Marital Status:    Housing Stability: Patient Declined (12/17/2023)    Housing Stability Vital Sign     Unable to Pay for Housing in the Last Year: Patient declined     Unstable Housing in the Last Year: Patient declined       Review of patient's allergies indicates:   Allergen Reactions    Azathioprine Shortness Of Breath and Other (See Comments)     Fatigue       Current Outpatient Medications on File Prior to Visit   Medication Sig Dispense Refill    ACCU-CHEK FASTCLIX LANCING DEV Kit USE AS DIRECTED. 1 each 0    ALCOHOL ANTISEPTIC PADS (ALCOHOL PREP PADS TOP)       apixaban (ELIQUIS) 5 mg Tab Take 1 tablet (5 mg total) by mouth 2 (two) times daily. Start this prescription after finishing starter pack 60 tablet 5    atorvastatin (LIPITOR) 20 MG tablet Take 1 tablet (20 mg total) by mouth once daily. 90 tablet 1    blood sugar diagnostic (ACCU-CHEK SMARTVIEW TEST STRIP) Strp Use as directed to check blood sugar twice daily. 200 strip 3    blood sugar diagnostic Strp To check BG three times daily, to use with insurance preferred meter 300 each 3    blood-glucose meter kit Use as instructed 1 each 0    carvediloL (COREG) 25 MG tablet Take 1 tablet (25 mg total) by mouth 2 (two) times daily. 180 tablet 3    diclofenac sodium (VOLTAREN) 1 % Gel Apply 2 g topically once daily. 100 g 5    DULoxetine (CYMBALTA) 30 MG capsule Take 1 capsule (30 mg total) by mouth  "once daily. 90 capsule 3    epoetin german-epbx (RETACRIT) 10,000 unit/mL imjection Inject 20,000 Units into the skin every 14 (fourteen) days.      fenofibrate 160 MG Tab Take 1 tablet (160 mg total) by mouth once daily. 90 tablet 1    folic acid (FOLVITE) 1 MG tablet Take 1 tablet (1,000 mcg total) by mouth once daily. 90 tablet 1    hydrALAZINE (APRESOLINE) 25 MG tablet Take 2 tabs every 8 hours by mouth. Check BP 2 hours after each dose and if Blood pressure >160- please take 1 extra tab 120 tablet 1    insulin (LANTUS SOLOSTAR U-100 INSULIN) glargine 100 units/mL SubQ pen Inject 10 Units into the skin every evening. 18 mL 1    KLGA ketoprofen 10% LIDOcaine 10% gabapentin 6% amitriptyline 2% in transdermal cream Apply 1 to 2 grams 3 to 4 times daily 90 g 11    levETIRAcetam (KEPPRA) 500 MG Tab Take 1 tablet (500 mg total) by mouth 2 (two) times daily. 60 tablet 5    levothyroxine (SYNTHROID) 50 MCG tablet Take 1 tablet (50 mcg total) by mouth before breakfast. 90 tablet 3    NIFEdipine (PROCARDIA-XL) 90 MG (OSM) 24 hr tablet Take 1 tablet (90 mg total) by mouth once daily. 90 tablet 1    pantoprazole (PROTONIX) 40 MG tablet Take 1 tablet (40 mg total) by mouth once daily. 90 tablet 1    pen needle, diabetic (NOVOFINE 32) 32 gauge x 1/4" Ndle For use with levermir pen . 100 each 3    predniSONE (DELTASONE) 1 MG tablet 4 tabs po daily for 1 month then decrease by 1 tab every month if labs allow 120 tablet 0    tiZANidine (ZANAFLEX) 2 MG tablet Take 2 tablets (4 mg total) by mouth every 8 (eight) hours as needed (muscle spasm). 270 tablet 1    albuterol-ipratropium (DUO-NEB) 2.5 mg-0.5 mg/3 mL nebulizer solution Take 3 mLs by nebulization every 4 (four) hours as needed for Wheezing or Shortness of Breath. Rescue 90 each 11    calcium carbonate (OS-MALCOLM) 600 mg calcium (1,500 mg) Tab Take 1 tablet by mouth 2 (two) times daily.       cetirizine (ZYRTEC) 10 MG tablet Take 1 tablet (10 mg total) by mouth once daily. 30 " tablet 0    fluticasone propion-salmeterol 115-21 mcg/dose (ADVAIR HFA) 115-21 mcg/actuation HFAA inhaler Inhale 2 puffs into the lungs every 12 (twelve) hours. Controller 12 g 3    olopatadine (PATANOL) 0.1 % ophthalmic solution Place 1 drop into both eyes daily as needed for Allergies. 5 mL 1    [DISCONTINUED] gabapentin (NEURONTIN) 400 MG capsule Take 1 capsule (400 mg total) by mouth 3 (three) times daily 90 capsule 1     Current Facility-Administered Medications on File Prior to Visit   Medication Dose Route Frequency Provider Last Rate Last Admin    [COMPLETED] triamcinolone acetonide injection 80 mg  80 mg Intramuscular Once Mik-Leti Mac MD   80 mg at 02/01/24 1056       Objective:      BP (!) 188/84 (BP Location: Right arm, Patient Position: Sitting, BP Method: Medium (Automatic))   Pulse 95   Resp 18   LMP  (LMP Unknown)   SpO2 98%     Exam:  GEN:  Well developed, well nourished.  No acute distress.   HEENT:  No trauma.  Mucous membranes moist.  Nares patent bilaterally.  PSYCH: Normal affect. Thought content appropriate.  CHEST:  Breathing symmetric.  No audible wheezing.  ABD: Soft, non-distended.  SKIN:  Warm, pink, dry.  No rash on exposed areas.    EXT:  No cyanosis, clubbing, or edema.  No color change or changes in nail or hair growth.  NEURO/MUSCULOSKELETAL:  Fully alert, oriented, and appropriate. Speech normal kristen. No cranial nerve deficits.   Gait:  Antalgic.  Uses scooter for community mobility.  No focal motor deficits.       Imaging:    Narrative & Impression    EXAMINATION:  MRI LUMBAR SPINE WITHOUT CONTRAST     CLINICAL HISTORY:  Low back pain, symptoms persist with > 6wks conservative treatment;  Dorsalgia, unspecified     FINDINGS:  There is a levoconvex curvature of the lumbar spine.  No marrow replacement, marrow edema, infection, or neoplasm is seen.  No soft tissue injury or whiplash injury is seen.  The distal cord-conus is unremarkable.   T9-T10, T10-T11, and  T11-T12 demonstrate no significant abnormalities.     T12-L1 demonstrates mild DJD.  The canal and neural foramina are patent.     L1-L2 demonstrates a right paracentral shallow disc protrusion that flattens the right anterolateral thecal sac.  There is mild right neural foraminal narrowing.     L2-L3 demonstrates a disc bulge.  There is mild canal and mild neural foraminal stenosis.     L3-L4 demonstrates a right paracentral disc protrusion that flattens the right anterolateral thecal sac.  There is right neural foraminal narrowing.     L4-L5 demonstrates diffuse disc bulge.  There is mild right neural foraminal stenosis.     L5-S1 demonstrates a diffuse disc bulge.  There is severe left, moderate right neural foraminal narrowing.     No paraspinal masses are seen.  There are bilateral renal lesions most likely cysts.     Impression:     Degenerative changes as above.        Electronically signed by: Rudy Barr MD  Date:                                            09/01/2023  Time:                                           09:50       Assessment:       Encounter Diagnoses   Name Primary?    Chronic pain syndrome     Degenerative disc disease, lumbar Yes    Lumbar radiculopathy     Neck pain     Left hip pain     Sarcoidosis          Plan:       Regino was seen today for low-back pain.    Diagnoses and all orders for this visit:    Degenerative disc disease, lumbar    Chronic pain syndrome  -     Ambulatory referral/consult to Pain Clinic    Lumbar radiculopathy    Neck pain    Left hip pain    Sarcoidosis        Regino Lawrence is a 78 y.o. female with chronic fairly widespread peripheral joint pain related to osteoarthritis.  Also with pain related to sarcoidosis.  Has focal low back pain.  Significant multilevel degenerative changes of the intervertebral discs and lumbar facets.  Causing varying degrees of central and foraminal stenosis.  Patient has undergone multiple interventional procedures with limited  relief.  Most recently underwent lumbar medial branch blocks without any relief.  On chronic oral steroid medications.  Known to have osteoporosis with history of vertebral body fracture at T4..    Pertinent imaging studies reviewed by me. Imaging results were discussed with patient.  We discussed that I do not have any additional interventional procedure options at this time.  Patient has undergone and failed multiple injections in the past.  She has not interested in additional medial branch blocks or RFA.  I am somewhat reluctant to utilize any other injections that would require the use of corticosteroids given chronic oral steroid use as well as history of osteoporosis.  Patient expressed understanding and agreement.    Okay to continue current medications.  Return to clinic as needed.            This note was created by combination of typed  and M-Modal dictation. Transcription and phonetic errors may be present.  If there are any questions, please contact me.

## 2024-02-09 ENCOUNTER — INFUSION (OUTPATIENT)
Dept: INFUSION THERAPY | Facility: HOSPITAL | Age: 79
End: 2024-02-09
Attending: INTERNAL MEDICINE
Payer: MEDICARE

## 2024-02-09 VITALS
OXYGEN SATURATION: 97 % | SYSTOLIC BLOOD PRESSURE: 149 MMHG | DIASTOLIC BLOOD PRESSURE: 71 MMHG | HEART RATE: 89 BPM | TEMPERATURE: 99 F | RESPIRATION RATE: 16 BRPM

## 2024-02-09 DIAGNOSIS — D63.1 ANEMIA IN STAGE 3 CHRONIC KIDNEY DISEASE: Primary | ICD-10-CM

## 2024-02-09 DIAGNOSIS — Z53.1 REFUSAL OF BLOOD TRANSFUSIONS AS PATIENT IS JEHOVAH'S WITNESS: ICD-10-CM

## 2024-02-09 DIAGNOSIS — D63.1 ANEMIA IN STAGE 3 CHRONIC KIDNEY DISEASE, UNSPECIFIED WHETHER STAGE 3A OR 3B CKD: ICD-10-CM

## 2024-02-09 DIAGNOSIS — N18.30 ANEMIA IN STAGE 3 CHRONIC KIDNEY DISEASE, UNSPECIFIED WHETHER STAGE 3A OR 3B CKD: ICD-10-CM

## 2024-02-09 DIAGNOSIS — D50.9 IRON DEFICIENCY ANEMIA, UNSPECIFIED IRON DEFICIENCY ANEMIA TYPE: ICD-10-CM

## 2024-02-09 DIAGNOSIS — D63.1 ANEMIA IN CHRONIC KIDNEY DISEASE, UNSPECIFIED CKD STAGE: ICD-10-CM

## 2024-02-09 DIAGNOSIS — N18.30 ANEMIA IN STAGE 3 CHRONIC KIDNEY DISEASE: Primary | ICD-10-CM

## 2024-02-09 DIAGNOSIS — N18.9 ANEMIA IN CHRONIC KIDNEY DISEASE, UNSPECIFIED CKD STAGE: ICD-10-CM

## 2024-02-09 PROCEDURE — 63600175 PHARM REV CODE 636 W HCPCS: Mod: JZ,EC,JG | Performed by: INTERNAL MEDICINE

## 2024-02-09 PROCEDURE — 96372 THER/PROPH/DIAG INJ SC/IM: CPT

## 2024-02-09 RX ADMIN — EPOETIN ALFA-EPBX 40000 UNITS: 40000 INJECTION, SOLUTION INTRAVENOUS; SUBCUTANEOUS at 12:02

## 2024-02-09 NOTE — PLAN OF CARE
Patient arrived to unit for Retacrit injection. Labs reviewed, HGB 10.3 today. Plan of care reviewed, patient agreeable to plan. Patient tolerated injection well. VSS. Discharge instructions reviewed, patient instructed to return 2/23/24. Patient left off unit in scooter escorted by daughter. Patient in NAD at time of discharge.

## 2024-02-13 ENCOUNTER — NURSE TRIAGE (OUTPATIENT)
Dept: ADMINISTRATIVE | Facility: CLINIC | Age: 79
End: 2024-02-13
Payer: MEDICARE

## 2024-02-13 NOTE — TELEPHONE ENCOUNTER
"Pt with complaints of slurred speech since 1am, pt is concerned she may have taken 1 extra "Keppra" tonight.  Pt complains of weakness and unable to walk.  Care advice states to call 911, Family/daughter verbally understood, all questions answered, advised to call back for any worsening symptoms or further needs.    Reason for Disposition   [1] SEVERE weakness (i.e., unable to walk or barely able to walk, requires support) AND [2] new-onset or worsening    Protocols used: Neurologic Deficit-A-AH    "

## 2024-02-14 DIAGNOSIS — E03.9 HYPOTHYROIDISM, UNSPECIFIED TYPE: ICD-10-CM

## 2024-02-14 NOTE — TELEPHONE ENCOUNTER
Care Due:                  Date            Visit Type   Department     Provider  --------------------------------------------------------------------------------                                [facility]                              PRIMARY      MEDICINE/  Last Visit: 01-      CARE ([facility])   INTERNAL MED   Micaela Mendoza  Next Visit: None Scheduled  None         None Found                                                            Last  Test          Frequency    Reason                     Performed    Due Date  --------------------------------------------------------------------------------    TSH.........  12 months..  levothyroxine............  06- 06-    [facility] Embedded Care Due Messages. Reference number: [id number].   2/14/2024 1:02:15 PM CST

## 2024-02-15 NOTE — TELEPHONE ENCOUNTER
Refill Routing Note   Medication(s) are not appropriate for processing by [facility] for the following reason(s):        Required labs outdated    [facility] action(s):  Defer   Requires labs : Yes               Appointments  past 12m or future 3m with PCP    Date Provider   Last Visit   1/22/2024 Micaela Mendoza MD   Next Visit   Visit date not found Micaela Mendoza MD   ED visits in past 90 days: 1        Note composed:4:43 PM 02/15/2024

## 2024-02-16 RX ORDER — LEVOTHYROXINE SODIUM 50 UG/1
50 TABLET ORAL
Qty: 90 TABLET | Refills: 3 | Status: SHIPPED | OUTPATIENT
Start: 2024-02-16

## 2024-02-23 ENCOUNTER — INFUSION (OUTPATIENT)
Dept: INFUSION THERAPY | Facility: HOSPITAL | Age: 79
End: 2024-02-23
Attending: INTERNAL MEDICINE
Payer: MEDICARE

## 2024-02-23 VITALS
TEMPERATURE: 98 F | DIASTOLIC BLOOD PRESSURE: 74 MMHG | OXYGEN SATURATION: 95 % | RESPIRATION RATE: 16 BRPM | SYSTOLIC BLOOD PRESSURE: 158 MMHG | HEART RATE: 86 BPM

## 2024-02-23 DIAGNOSIS — D63.1 ANEMIA IN CHRONIC KIDNEY DISEASE, UNSPECIFIED CKD STAGE: ICD-10-CM

## 2024-02-23 DIAGNOSIS — N18.30 ANEMIA IN STAGE 3 CHRONIC KIDNEY DISEASE, UNSPECIFIED WHETHER STAGE 3A OR 3B CKD: ICD-10-CM

## 2024-02-23 DIAGNOSIS — N18.30 ANEMIA IN STAGE 3 CHRONIC KIDNEY DISEASE: Primary | ICD-10-CM

## 2024-02-23 DIAGNOSIS — N18.9 ANEMIA IN CHRONIC KIDNEY DISEASE, UNSPECIFIED CKD STAGE: ICD-10-CM

## 2024-02-23 DIAGNOSIS — D50.9 IRON DEFICIENCY ANEMIA, UNSPECIFIED IRON DEFICIENCY ANEMIA TYPE: ICD-10-CM

## 2024-02-23 DIAGNOSIS — Z53.1 REFUSAL OF BLOOD TRANSFUSIONS AS PATIENT IS JEHOVAH'S WITNESS: ICD-10-CM

## 2024-02-23 DIAGNOSIS — D63.1 ANEMIA IN STAGE 3 CHRONIC KIDNEY DISEASE, UNSPECIFIED WHETHER STAGE 3A OR 3B CKD: ICD-10-CM

## 2024-02-23 DIAGNOSIS — D63.1 ANEMIA IN STAGE 3 CHRONIC KIDNEY DISEASE: Primary | ICD-10-CM

## 2024-02-23 PROCEDURE — 96372 THER/PROPH/DIAG INJ SC/IM: CPT

## 2024-02-23 PROCEDURE — 63600175 PHARM REV CODE 636 W HCPCS: Mod: JZ,EC,JG | Performed by: INTERNAL MEDICINE

## 2024-02-23 RX ADMIN — EPOETIN ALFA-EPBX 40000 UNITS: 40000 INJECTION, SOLUTION INTRAVENOUS; SUBCUTANEOUS at 11:02

## 2024-02-24 NOTE — PLAN OF CARE
Patient presented to unit for q2wks Retacrit injection. VSS. Hgb 10.7 today. Parameters met for injection. Hold at Hgb > 11. Injectio administered SC on left arm. Injection tolerated well. Patient discharged from unit in [facility] via motorized scooter accompanied by her son in NAD.     Problem: Adjustment to Illness (Chronic Kidney Disease)  Goal: Optimal Coping with Chronic Illness  Outcome: Ongoing, Progressing

## 2024-02-29 ENCOUNTER — PATIENT MESSAGE (OUTPATIENT)
Dept: FAMILY MEDICINE | Facility: CLINIC | Age: 79
End: 2024-02-29
Payer: MEDICARE

## 2024-03-01 ENCOUNTER — PATIENT MESSAGE (OUTPATIENT)
Dept: ADMINISTRATIVE | Facility: HOSPITAL | Age: 79
End: 2024-03-01
Payer: MEDICARE

## 2024-03-01 ENCOUNTER — LAB VISIT (OUTPATIENT)
Dept: LAB | Facility: HOSPITAL | Age: 79
End: 2024-03-01
Attending: INTERNAL MEDICINE
Payer: MEDICARE

## 2024-03-01 DIAGNOSIS — N18.9 ANEMIA IN CHRONIC KIDNEY DISEASE, UNSPECIFIED CKD STAGE: ICD-10-CM

## 2024-03-01 DIAGNOSIS — D63.1 ANEMIA IN CHRONIC KIDNEY DISEASE, UNSPECIFIED CKD STAGE: ICD-10-CM

## 2024-03-01 DIAGNOSIS — E11.3292 CONTROLLED TYPE 2 DIABETES MELLITUS WITH LEFT EYE AFFECTED BY MILD NONPROLIFERATIVE RETINOPATHY WITHOUT MACULAR EDEMA, WITHOUT LONG-TERM CURRENT USE OF INSULIN: Chronic | ICD-10-CM

## 2024-03-01 LAB
ALBUMIN SERPL BCP-MCNC: 3.7 G/DL (ref 3.5–5.2)
ALP SERPL-CCNC: 51 U/L (ref 55–135)
ALT SERPL W/O P-5'-P-CCNC: 14 U/L (ref 10–44)
ANION GAP SERPL CALC-SCNC: 11 MMOL/L (ref 8–16)
AST SERPL-CCNC: 19 U/L (ref 10–40)
BASOPHILS # BLD AUTO: 0.01 K/UL (ref 0–0.2)
BASOPHILS NFR BLD: 0.3 % (ref 0–1.9)
BILIRUB SERPL-MCNC: 0.4 MG/DL (ref 0.1–1)
BUN SERPL-MCNC: 16 MG/DL (ref 8–23)
CALCIUM SERPL-MCNC: 9.4 MG/DL (ref 8.7–10.5)
CHLORIDE SERPL-SCNC: 101 MMOL/L (ref 95–110)
CO2 SERPL-SCNC: 25 MMOL/L (ref 23–29)
CREAT SERPL-MCNC: 1.2 MG/DL (ref 0.5–1.4)
DIFFERENTIAL METHOD BLD: ABNORMAL
EOSINOPHIL # BLD AUTO: 0 K/UL (ref 0–0.5)
EOSINOPHIL NFR BLD: 1.2 % (ref 0–8)
ERYTHROCYTE [DISTWIDTH] IN BLOOD BY AUTOMATED COUNT: 14.5 % (ref 11.5–14.5)
EST. GFR  (NO RACE VARIABLE): 46.3 ML/MIN/1.73 M^2
ESTIMATED AVG GLUCOSE: 111 MG/DL (ref 68–131)
FERRITIN SERPL-MCNC: 87 NG/ML (ref 20–300)
GLUCOSE SERPL-MCNC: 115 MG/DL (ref 70–110)
HBA1C MFR BLD: 5.5 % (ref 4–5.6)
HCT VFR BLD AUTO: 37.1 % (ref 37–48.5)
HGB BLD-MCNC: 11.7 G/DL (ref 12–16)
IMM GRANULOCYTES # BLD AUTO: 0.02 K/UL (ref 0–0.04)
IMM GRANULOCYTES NFR BLD AUTO: 0.6 % (ref 0–0.5)
LYMPHOCYTES # BLD AUTO: 0.9 K/UL (ref 1–4.8)
LYMPHOCYTES NFR BLD: 27.1 % (ref 18–48)
MCH RBC QN AUTO: 33.8 PG (ref 27–31)
MCHC RBC AUTO-ENTMCNC: 31.5 G/DL (ref 32–36)
MCV RBC AUTO: 107 FL (ref 82–98)
MONOCYTES # BLD AUTO: 0.3 K/UL (ref 0.3–1)
MONOCYTES NFR BLD: 9.3 % (ref 4–15)
NEUTROPHILS # BLD AUTO: 2 K/UL (ref 1.8–7.7)
NEUTROPHILS NFR BLD: 61.5 % (ref 38–73)
NRBC BLD-RTO: 0 /100 WBC
PLATELET # BLD AUTO: 345 K/UL (ref 150–450)
PMV BLD AUTO: 9.2 FL (ref 9.2–12.9)
POTASSIUM SERPL-SCNC: 3.4 MMOL/L (ref 3.5–5.1)
PROT SERPL-MCNC: 6.8 G/DL (ref 6–8.4)
RBC # BLD AUTO: 3.46 M/UL (ref 4–5.4)
SODIUM SERPL-SCNC: 137 MMOL/L (ref 136–145)
WBC # BLD AUTO: 3.21 K/UL (ref 3.9–12.7)

## 2024-03-01 PROCEDURE — 36415 COLL VENOUS BLD VENIPUNCTURE: CPT | Mod: PO | Performed by: INTERNAL MEDICINE

## 2024-03-01 PROCEDURE — 85025 COMPLETE CBC W/AUTO DIFF WBC: CPT | Performed by: INTERNAL MEDICINE

## 2024-03-01 PROCEDURE — 82728 ASSAY OF FERRITIN: CPT | Performed by: INTERNAL MEDICINE

## 2024-03-01 PROCEDURE — 80053 COMPREHEN METABOLIC PANEL: CPT | Performed by: INTERNAL MEDICINE

## 2024-03-01 PROCEDURE — 83036 HEMOGLOBIN GLYCOSYLATED A1C: CPT | Performed by: FAMILY MEDICINE

## 2024-03-05 DIAGNOSIS — D86.9 SARCOIDOSIS: ICD-10-CM

## 2024-03-05 DIAGNOSIS — G72.9 MYOPATHY: ICD-10-CM

## 2024-03-05 RX ORDER — PREDNISONE 1 MG/1
TABLET ORAL
Qty: 120 TABLET | Refills: 0 | Status: SHIPPED | OUTPATIENT
Start: 2024-03-05 | End: 2024-05-01 | Stop reason: SDUPTHER

## 2024-03-06 ENCOUNTER — OFFICE VISIT (OUTPATIENT)
Dept: HEMATOLOGY/ONCOLOGY | Facility: CLINIC | Age: 79
End: 2024-03-06
Payer: MEDICARE

## 2024-03-06 VITALS
DIASTOLIC BLOOD PRESSURE: 76 MMHG | WEIGHT: 131.38 LBS | BODY MASS INDEX: 24.18 KG/M2 | SYSTOLIC BLOOD PRESSURE: 144 MMHG | HEIGHT: 62 IN | OXYGEN SATURATION: 97 % | HEART RATE: 94 BPM

## 2024-03-06 DIAGNOSIS — N18.9 CHRONIC KIDNEY DISEASE, UNSPECIFIED CKD STAGE: ICD-10-CM

## 2024-03-06 DIAGNOSIS — N18.9 ANEMIA IN CHRONIC KIDNEY DISEASE, UNSPECIFIED CKD STAGE: Primary | ICD-10-CM

## 2024-03-06 DIAGNOSIS — E87.6 HYPOKALEMIA: ICD-10-CM

## 2024-03-06 DIAGNOSIS — Z53.1 REFUSAL OF BLOOD TRANSFUSIONS AS PATIENT IS JEHOVAH'S WITNESS: ICD-10-CM

## 2024-03-06 DIAGNOSIS — D86.9 SARCOIDOSIS: ICD-10-CM

## 2024-03-06 DIAGNOSIS — D63.1 ANEMIA IN CHRONIC KIDNEY DISEASE, UNSPECIFIED CKD STAGE: Primary | ICD-10-CM

## 2024-03-06 PROCEDURE — 1159F MED LIST DOCD IN RCRD: CPT | Mod: CPTII,S$GLB,, | Performed by: INTERNAL MEDICINE

## 2024-03-06 PROCEDURE — 3072F LOW RISK FOR RETINOPATHY: CPT | Mod: CPTII,S$GLB,, | Performed by: INTERNAL MEDICINE

## 2024-03-06 PROCEDURE — 99213 OFFICE O/P EST LOW 20 MIN: CPT | Mod: S$GLB,,, | Performed by: INTERNAL MEDICINE

## 2024-03-06 PROCEDURE — 3078F DIAST BP <80 MM HG: CPT | Mod: CPTII,S$GLB,, | Performed by: INTERNAL MEDICINE

## 2024-03-06 PROCEDURE — 1125F AMNT PAIN NOTED PAIN PRSNT: CPT | Mod: CPTII,S$GLB,, | Performed by: INTERNAL MEDICINE

## 2024-03-06 PROCEDURE — 99999 PR PBB SHADOW E&M-EST. PATIENT-LVL III: CPT | Mod: PBBFAC,,, | Performed by: INTERNAL MEDICINE

## 2024-03-06 PROCEDURE — 3288F FALL RISK ASSESSMENT DOCD: CPT | Mod: CPTII,S$GLB,, | Performed by: INTERNAL MEDICINE

## 2024-03-06 PROCEDURE — 3077F SYST BP >= 140 MM HG: CPT | Mod: CPTII,S$GLB,, | Performed by: INTERNAL MEDICINE

## 2024-03-06 PROCEDURE — 1157F ADVNC CARE PLAN IN RCRD: CPT | Mod: CPTII,S$GLB,, | Performed by: INTERNAL MEDICINE

## 2024-03-06 PROCEDURE — 1101F PT FALLS ASSESS-DOCD LE1/YR: CPT | Mod: CPTII,S$GLB,, | Performed by: INTERNAL MEDICINE

## 2024-03-06 RX ORDER — POTASSIUM CHLORIDE 20 MEQ/1
20 TABLET, EXTENDED RELEASE ORAL DAILY
Qty: 5 TABLET | Refills: 0 | Status: SHIPPED | OUTPATIENT
Start: 2024-03-06 | End: 2024-03-11

## 2024-03-06 NOTE — Clinical Note
CBC q2wks STANDING Add Ferritin, TIBC to next lab draw-standing Cont EPO  inj q 2wks( pending lab parameters) at increased dosage F/u in 3mos with cbc, TIBC and Ferritin studies prior to f/u -standing orders

## 2024-03-06 NOTE — PROGRESS NOTES
Subjective:        Patient ID: Regino Lawrence is a 78 y.o. female.    Chief Complaint: Follow-up anemia      Diagnosis: Anemia in CKD  Patient is a Zoroastrian  .  Prior Hx;  The patient is seen today for f/u  chronic anemia in CKD undergoing EPO injections.The patient reports that she has been diagnosed with JOLLY in the past.  She has been on oral iron supplementation therapy, but could not tolerate or did not respond, she is uncertainShe also has  undergone intermittent IV iron therapy.  She is followed by GI and has undergone a colonoscopy earlier this year and was diagnosed with hemorrhoids for which she underwent banding procedure.  No melena, hematochezia,change in bowel habits.  She has also been diagnosed with B12 deficiency in the past, but reports she did not respond to B12 injections. No history of blood transfusions.  She is a Zoroastrian.  She reports that she remembers getting injections in the 1970s when her blood count was low.   She is followed by Rheumatology for history of sarcoidosis with associated myopathy and arthropathy. She has been treated in the  past with methotrexate and Plaquenil, both of which were ineffectiveCellcept and imuran caused some unknown side effect. She is followed by PCP for DMShe was admitted 6/2022 with worsening SOB. She also has history of cervical spinal stenosis s/p posterior C3-C7 laminectomy and fusion on 11/16/15 by Dr. Conner.    Interval Hx;   She continues with Chronic diffuse arthralgias   She is followed by pain mgmt  She is f/b Rheumatology for sarcoidosis  In [facility]  She is undergoing epo inj q 2wks  Hb 11.7 g/dL on 3/1/24  No CP/cough  Chronic Mild fatigue  Mild FAUSTIN-stable  No melena, hematochezia or change in bowel habits  She also has  undergone intermittent IV iron therapy        PAST MEDICAL HISTORY:  Acid reflux, alopecia, anemia, anxiety disorder, chronic  kidney disease, depression, diabetes mellitus type 2, hyperlipidemia,   "hypertension, hypothyroidism, osteoporosis, sarcoidosis.    PAST SURGICAL HISTORY:  Cholecystectomy, , tubal ligation, carpal tunnel release, cataracts.    FAMILY HISTORY: Unremarkable for cancer. Significant for HTN.       Review of Systems   Constitutional:  Positive for fatigue (chronic, mild). Negative for activity change and appetite change.   HENT:  Negative for hearing loss and nosebleeds.    Eyes:  Negative for visual disturbance.   Respiratory:  Negative for cough and shortness of breath.    Cardiovascular:  Negative for chest pain and leg swelling.   Gastrointestinal:  Negative for abdominal pain, constipation, diarrhea and nausea.   Genitourinary:  Negative for flank pain and urgency.   Musculoskeletal:  Positive for arthralgias, back pain, joint swelling and myalgias. Negative for gait problem.   Skin:  Negative for rash.        No petechiae, ecchymoses   Neurological:  Negative for weakness, light-headedness and headaches.   Hematological:  Negative for adenopathy. Does not bruise/bleed easily.       Objective:         Vitals:    24 1412   BP: (!) 144/76   Pulse: 94   SpO2: 97%   Weight: 59.6 kg (131 lb 6.3 oz)   Height: 5' 2" (1.575 m)         .Physical Exam   Constitutional: She is oriented to person, place, and time. She appears well-developed and well-nourished in motorized scooter  HENT:   Head: Normocephalic.   Eyes: Conjunctivae and lids are normal.No scleral icterus.   Neck: Normal range of motion. Neck supple.  Cardiovascular: Normal rate, regular rhythm and normal heart sounds.    No murmur heard.  Pulmonary/Chest: Breath sounds normal. She has no wheezes. She has no rales.   Abdominal: Soft. Bowel sounds are normal. There is no tenderness. There is no rebound and no guarding.   Musculoskeletal: Ambulates w/assistance of motorized scooter  Neurological: She is alert and oriented to person, place, and time. No cranial nerve deficit.  Skin: Skin is warm and dry. No ecchymosis, no " petechiae and no rash noted. No erythema.   Psychiatric: She has a normal mood and affect.               Lab Results   Component Value Date    WBC 3.21 (L) 03/01/2024    HGB 11.7 (L) 03/01/2024    HCT 37.1 03/01/2024     (H) 03/01/2024     03/01/2024     Lab Results   Component Value Date    IRON 108 12/01/2023    TIBC 388 12/01/2023    FERRITIN 87 03/01/2024     SPEP-nl      CT renal 3/6/2017   IMPRESSION:  1.  No renal, ureteral or bladder calculi.  No hydronephrosis or ureterectasis.  2.  Poorly distended bladder.  Mild bladder wall prominence.  Mild cystitis cannot be   excluded.  3.  Moderate constipation.  Normal appendix      Lab Results   Component Value Date    IRON 108 12/01/2023    TIBC 388 12/01/2023    FERRITIN 87 03/01/2024     Lab Results   Component Value Date    WBC 3.21 (L) 03/01/2024    HGB 11.7 (L) 03/01/2024    HCT 37.1 03/01/2024     (H) 03/01/2024     03/01/2024         Assessment:       1. Anemia in chronic kidney disease, unspecified CKD stage    2. Refusal of blood transfusions as patient is Evangelical    3. Chronic kidney disease, unspecified CKD stage    4. Sarcoidosis    5. Hypokalemia            Plan:   1.2.    Pt is a [employer] and declines/not interested in blood and blood products due to Tenriism beliefs  She is undergoing epo inj q 2wks  Pt followed by Nephrology . It has been determined early CKD stage III, suspect due to age-related renal nephron loss, along with possible diabetic nephropathy v. hypertensive nephrosclerosis  CBC q2wks STANDING  Cont  EPO dosage  inj q 2wks( pending lab parameters)  Continue periodic monitoring of Fe studies  Hb 11.7 g/dL on 3/1/24  According to KIDIGO guidelines patients with CKD , a  gfr , <60 cc/min and anemia, iron therapy is appropriate if the Tsat is < 30 and ferritin < 500 mg/dl.    Check Fe  Follow-up with PCP for med mgmt    3 f/b nephrology  Last Cr level 1.2mg/dL on 3/1/24       4. F/b  Rheumatology  Manifested by myopathy and arthropathy.  Persistent joint pain and myalgias despite prednisone 5 mg. Unable to tolerate immunosuppressants/steroid sparing agents  She remains on chronic po steroids  She recently underwent steroid inj     5. Rx for K supp    CBC q2wks STANDING  Add Ferritin, TIBC to next lab draw-standing  Cont EPO  inj q 2wks( pending lab parameters) at increased dosage  F/u in 3mos with cbc, TIBC and Ferritin studies prior to f/u -standing orders        Advance Care Planning     Power of   I previously initiated the process of advance care planning today and explained the importance of this process to the patient.  I introduced the concept of advance directives to the patient, as well. Then the patient received detailed information about the importance of designating a Health Care Power of  (HCPOA). She was also instructed to communicate with this person about their wishes for future healthcare, should she become sick and lose decision-making capacity. The patient has  previously appointed a HCPOA. Pt completed in 2015           CC: Micaela Mendoza M.D.

## 2024-03-11 ENCOUNTER — HOSPITAL ENCOUNTER (EMERGENCY)
Facility: HOSPITAL | Age: 79
Discharge: HOME | End: 2024-03-11
Attending: EMERGENCY MEDICINE
Payer: MEDICARE

## 2024-03-11 VITALS
OXYGEN SATURATION: 95 % | SYSTOLIC BLOOD PRESSURE: 158 MMHG | RESPIRATION RATE: 16 BRPM | WEIGHT: 131 LBS | DIASTOLIC BLOOD PRESSURE: 81 MMHG | HEIGHT: 62 IN | HEART RATE: 96 BPM | TEMPERATURE: 97 F | BODY MASS INDEX: 24.11 KG/M2

## 2024-03-11 DIAGNOSIS — G40.919 BREAKTHROUGH SEIZURE: Primary | ICD-10-CM

## 2024-03-11 DIAGNOSIS — R56.9 SEIZURES: ICD-10-CM

## 2024-03-11 DIAGNOSIS — R00.0 TACHYCARDIA: ICD-10-CM

## 2024-03-11 DIAGNOSIS — R51.9 NONINTRACTABLE HEADACHE, UNSPECIFIED CHRONICITY PATTERN, UNSPECIFIED HEADACHE TYPE: ICD-10-CM

## 2024-03-11 DIAGNOSIS — I10 HYPERTENSION, UNSPECIFIED TYPE: ICD-10-CM

## 2024-03-11 LAB
ALBUMIN SERPL BCP-MCNC: 4.1 G/DL (ref 3.5–5.2)
ALP SERPL-CCNC: 66 U/L (ref 55–135)
ALT SERPL W/O P-5'-P-CCNC: 20 U/L (ref 10–44)
ANION GAP SERPL CALC-SCNC: 12 MMOL/L (ref 8–16)
AST SERPL-CCNC: 24 U/L (ref 10–40)
BASOPHILS # BLD AUTO: 0.02 K/UL (ref 0–0.2)
BASOPHILS NFR BLD: 0.4 % (ref 0–1.9)
BILIRUB SERPL-MCNC: 0.5 MG/DL (ref 0.1–1)
BILIRUB UR QL STRIP: NEGATIVE
BUN SERPL-MCNC: 20 MG/DL (ref 8–23)
CALCIUM SERPL-MCNC: 9.8 MG/DL (ref 8.7–10.5)
CHLORIDE SERPL-SCNC: 95 MMOL/L (ref 95–110)
CLARITY UR REFRACT.AUTO: CLEAR
CO2 SERPL-SCNC: 23 MMOL/L (ref 23–29)
COLOR UR AUTO: COLORLESS
CREAT SERPL-MCNC: 1.1 MG/DL (ref 0.5–1.4)
DIFFERENTIAL METHOD BLD: ABNORMAL
EOSINOPHIL # BLD AUTO: 0 K/UL (ref 0–0.5)
EOSINOPHIL NFR BLD: 0.6 % (ref 0–8)
ERYTHROCYTE [DISTWIDTH] IN BLOOD BY AUTOMATED COUNT: 12.9 % (ref 11.5–14.5)
EST. GFR  (NO RACE VARIABLE): 51.4 ML/MIN/1.73 M^2
GLUCOSE SERPL-MCNC: 118 MG/DL (ref 70–110)
GLUCOSE UR QL STRIP: NEGATIVE
HCT VFR BLD AUTO: 36.4 % (ref 37–48.5)
HGB BLD-MCNC: 12 G/DL (ref 12–16)
HGB UR QL STRIP: ABNORMAL
IMM GRANULOCYTES # BLD AUTO: 0.03 K/UL (ref 0–0.04)
IMM GRANULOCYTES NFR BLD AUTO: 0.6 % (ref 0–0.5)
KETONES UR QL STRIP: NEGATIVE
LEUKOCYTE ESTERASE UR QL STRIP: NEGATIVE
LYMPHOCYTES # BLD AUTO: 0.8 K/UL (ref 1–4.8)
LYMPHOCYTES NFR BLD: 15.7 % (ref 18–48)
MCH RBC QN AUTO: 33.7 PG (ref 27–31)
MCHC RBC AUTO-ENTMCNC: 33 G/DL (ref 32–36)
MCV RBC AUTO: 102 FL (ref 82–98)
MONOCYTES # BLD AUTO: 0.4 K/UL (ref 0.3–1)
MONOCYTES NFR BLD: 7.1 % (ref 4–15)
NEUTROPHILS # BLD AUTO: 3.8 K/UL (ref 1.8–7.7)
NEUTROPHILS NFR BLD: 75.6 % (ref 38–73)
NITRITE UR QL STRIP: NEGATIVE
NRBC BLD-RTO: 0 /100 WBC
PH UR STRIP: 7 [PH] (ref 5–8)
PLATELET # BLD AUTO: 331 K/UL (ref 150–450)
PMV BLD AUTO: 9.3 FL (ref 9.2–12.9)
POCT GLUCOSE: 128 MG/DL (ref 70–110)
POTASSIUM SERPL-SCNC: 4.4 MMOL/L (ref 3.5–5.1)
PROT SERPL-MCNC: 7.7 G/DL (ref 6–8.4)
PROT UR QL STRIP: NEGATIVE
RBC # BLD AUTO: 3.56 M/UL (ref 4–5.4)
SODIUM SERPL-SCNC: 130 MMOL/L (ref 136–145)
SP GR UR STRIP: 1 (ref 1–1.03)
URN SPEC COLLECT METH UR: ABNORMAL
WBC # BLD AUTO: 5.08 K/UL (ref 3.9–12.7)

## 2024-03-11 PROCEDURE — 82962 GLUCOSE BLOOD TEST: CPT

## 2024-03-11 PROCEDURE — 96375 TX/PRO/DX INJ NEW DRUG ADDON: CPT

## 2024-03-11 PROCEDURE — 96374 THER/PROPH/DIAG INJ IV PUSH: CPT

## 2024-03-11 PROCEDURE — 93005 ELECTROCARDIOGRAM TRACING: CPT

## 2024-03-11 PROCEDURE — 93010 ELECTROCARDIOGRAM REPORT: CPT | Mod: ,,, | Performed by: INTERNAL MEDICINE

## 2024-03-11 PROCEDURE — 99285 EMERGENCY DEPT VISIT HI MDM: CPT | Mod: 25

## 2024-03-11 PROCEDURE — 80177 DRUG SCRN QUAN LEVETIRACETAM: CPT | Performed by: EMERGENCY MEDICINE

## 2024-03-11 PROCEDURE — 85025 COMPLETE CBC W/AUTO DIFF WBC: CPT | Performed by: EMERGENCY MEDICINE

## 2024-03-11 PROCEDURE — 63600175 PHARM REV CODE 636 W HCPCS: Performed by: PHYSICIAN ASSISTANT

## 2024-03-11 PROCEDURE — 63600175 PHARM REV CODE 636 W HCPCS: Performed by: EMERGENCY MEDICINE

## 2024-03-11 PROCEDURE — 25000003 PHARM REV CODE 250: Performed by: PHYSICIAN ASSISTANT

## 2024-03-11 PROCEDURE — 81003 URINALYSIS AUTO W/O SCOPE: CPT | Performed by: EMERGENCY MEDICINE

## 2024-03-11 PROCEDURE — 80053 COMPREHEN METABOLIC PANEL: CPT | Performed by: EMERGENCY MEDICINE

## 2024-03-11 RX ORDER — LEVETIRACETAM 500 MG/5ML
1500 INJECTION, SOLUTION, CONCENTRATE INTRAVENOUS
Status: COMPLETED | OUTPATIENT
Start: 2024-03-11 | End: 2024-03-11

## 2024-03-11 RX ORDER — MORPHINE SULFATE 2 MG/ML
2 INJECTION, SOLUTION INTRAMUSCULAR; INTRAVENOUS
Status: COMPLETED | OUTPATIENT
Start: 2024-03-11 | End: 2024-03-11

## 2024-03-11 RX ORDER — TIZANIDINE 2 MG/1
2 TABLET ORAL
Status: COMPLETED | OUTPATIENT
Start: 2024-03-11 | End: 2024-03-11

## 2024-03-11 RX ORDER — ACETAMINOPHEN 500 MG
1000 TABLET ORAL
Status: COMPLETED | OUTPATIENT
Start: 2024-03-11 | End: 2024-03-11

## 2024-03-11 RX ORDER — CARVEDILOL 12.5 MG/1
25 TABLET ORAL
Status: COMPLETED | OUTPATIENT
Start: 2024-03-11 | End: 2024-03-11

## 2024-03-11 RX ADMIN — TIZANIDINE 2 MG: 2 TABLET ORAL at 09:03

## 2024-03-11 RX ADMIN — MORPHINE SULFATE 2 MG: 2 INJECTION, SOLUTION INTRAMUSCULAR; INTRAVENOUS at 05:03

## 2024-03-11 RX ADMIN — LEVETIRACETAM 1500 MG: 100 INJECTION, SOLUTION INTRAVENOUS at 08:03

## 2024-03-11 RX ADMIN — ACETAMINOPHEN 1000 MG: 500 TABLET ORAL at 08:03

## 2024-03-11 RX ADMIN — CARVEDILOL 25 MG: 12.5 TABLET, FILM COATED ORAL at 08:03

## 2024-03-11 NOTE — ED PROVIDER NOTES
Encounter Date: 3/11/2024       History     Chief Complaint   Patient presents with    Seizures     Arrival via ems, coming from home, found on ground by family, postictal on ems arrival, GCS9, woke up in route, GCS of 14 now, pt in c-collar, hit head and on blood thinners. LKN at 1500     78 y.o. female with a PMHx of epilepsy, CKD, anemia, T2DM, PE (on eliquis) presents to the ED s/p seizure. History obtained by patient and her daughter. On Keppra and reports missing a dose yesterday. She does not remember seizure event. Her daughter saw her approximately 10 minutes prior to finding her on the ground. She is unsure if she hit her head, fall was not witnessed. She believes she had another seizure in her sleep last night because she had body aches and blurry vision when she woke up this morning but is unsure. She denies new pains today. Daughter notes that she is back to her baseline. She denies neck pain, headache, chest pain, N/V, dizziness     The history is provided by the patient and a relative.     Review of patient's allergies indicates:   Allergen Reactions    Azathioprine Shortness Of Breath and Other (See Comments)     Fatigue     Past Medical History:   Diagnosis Date    Acid reflux     Allergy     Alopecia     Anemia     Anemia in CKD (chronic kidney disease) 09/22/2016    Anxiety     Arthritis     Back pain     Cataract     Chronic kidney disease     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, without long-term current use of insulin     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, without long-term current use of insulin     Depression     Diabetes mellitus, type 2     Eye injury as a child     k-abrasion  od    Hyperlipidemia     Hypertension     Hypothyroidism     Immune deficiency disorder     Immune disorder     LOC (loss of consciousness) 03/12/2021    at home    Myalgia and myositis 09/06/2012     Osteoporosis     Polyneuropathy     Pulmonary embolism 07/10/2021    Renal manifestation of secondary diabetes mellitus     Sarcoidosis     Seizure     Type 2 diabetes mellitus     Ulcer     no cancer    Urinary incontinence      Past Surgical History:   Procedure Laterality Date    CARPAL TUNNEL RELEASE      Rt wrist    CATARACT EXTRACTION W/  INTRAOCULAR LENS IMPLANT Right 2015    Dr. Azevedo    CATARACT EXTRACTION W/  INTRAOCULAR LENS IMPLANT Left 2015    Dr. Azevedo    CERVICAL SPINE SURGERY       SECTION      CHOLECYSTECTOMY      INJECTION OF ANESTHETIC AGENT AROUND NERVE Left 2020    Procedure: BLOCK, NERVE LEFT FEMORAL AND OBTURATOR;  Surgeon: Alfonso Richards MD;  Location: [facility];  Service: Pain Management;  Laterality: Left;  NEEDS CONSENT    INJECTION OF ANESTHETIC AGENT AROUND NERVE Bilateral 10/09/2023    Procedure: BLOCK, NERVE, BILATERAL L3-L4-L5 MEDIAL BRANCH;  Surgeon: Alfonso Richards MD;  Location: [facility];  Service: Pain Management;  Laterality: Bilateral;    INJECTION OF JOINT Left 2019    Procedure: Injection, Joint  fLUOROSCOPIC jOINT iNJECTION (hIP iNJECTION) LEFT ROCH BURSA AS WELL LEFT TROCHANTERIC BURSA;  Surgeon: Alfonso Richards MD;  Location: [facility];  Service: Pain Management;  Laterality: Left;  NEEDS CONSENT, DIABETIC    INJECTION OF JOINT Left 2019    Procedure: Injection, Joint FLUOROSCOPIC JOINT INJECTION (HIP INJECTION) LEFT HIP;  Surgeon: Alfonso Richards MD;  Location: [facility];  Service: Pain Management;  Laterality: Left;  NEEDS CONSENT    INJECTION OF JOINT Left 2019    Procedure: INJECTION, JOINT;  Surgeon: Alfonso Richards MD;  Location: [facility];  Service: Pain Management;  Laterality: Left;  Left Hip and Left GTB Injections    INJECTION OF JOINT Left 2020    Procedure: INJECTION, JOINT, LEFT HIP and LEFT GREATER TROCHANTERIC BURSA;  Surgeon: Alfonso Richards MD;  Location: [facility];  Service: Pain  Management;  Laterality: Left;  INJECTION, JOINT, LEFT HIP and LEFT GREATER TROCHANTERIC BURSA    INJECTION OF JOINT Left 09/03/2020    Procedure: INJECTION, JOINT, LEFT SI;  Surgeon: Alfonso Richards MD;  Location: [facility];  Service: Pain Management;  Laterality: Left;  INJECTION, JOINT, LEFT SI    TRANSFORAMINAL EPIDURAL INJECTION OF STEROID Bilateral 12/05/2019    Procedure: INJECTION, STEROID, EPIDURAL, TRANSFORAMINAL APPROACH;  Surgeon: Alfonso Richards MD;  Location: [facility];  Service: Pain Management;  Laterality: Bilateral;  B/L TF RHIANNA L5  Consent Needed    TRANSFORAMINAL EPIDURAL INJECTION OF STEROID Bilateral 06/29/2020    Procedure: INJECTION, STEROID, EPIDURAL, TRANSFORAMINAL APPROACH L5/S1;  Surgeon: Alfonso Richards MD;  Location: [facility];  Service: Pain Management;  Laterality: Bilateral;  B/L TF RHIANNA L5/S1    TUBAL LIGATION       Family History   Problem Relation Age of Onset    Hypertension Mother     Cataracts Mother     No Known Problems Father     Hypertension Maternal Grandmother     Glaucoma Sister     Arthritis Sister     No Known Problems Brother     No Known Problems Maternal Aunt     No Known Problems Maternal Uncle     No Known Problems Paternal Aunt     No Known Problems Paternal Uncle     No Known Problems Maternal Grandfather     No Known Problems Paternal Grandmother     No Known Problems Paternal Grandfather     Kidney failure Sister     Hepatitis Sister     Cancer Sister         bone cancer     Immunodeficiency Sister     Diabetes Son     Hypertension Son     Lupus Neg Hx     Rheum arthritis Neg Hx     Amblyopia Neg Hx     Blindness Neg Hx     Macular degeneration Neg Hx     Retinal detachment Neg Hx     Strabismus Neg Hx     Stroke Neg Hx     Thyroid disease Neg Hx     Endometrial cancer Neg Hx     Vaginal cancer Neg Hx     Cervical cancer Neg Hx      Social History     Tobacco Use    Smoking status: Never     Passive exposure: Never    Smokeless tobacco: Never   Substance Use  Topics    Alcohol use: No    Drug use: No     Review of Systems   Constitutional:  Negative for fever.   Eyes:  Positive for visual disturbance.   Cardiovascular:  Negative for chest pain and leg swelling.   Gastrointestinal:  Negative for abdominal pain, nausea and vomiting.   Musculoskeletal:  Positive for myalgias. Negative for neck pain.   Neurological:  Positive for seizures. Negative for dizziness and headaches.       Physical Exam     Initial Vitals [03/11/24 1547]   BP Pulse Resp Temp SpO2   (!) 140/86 92 20 97.4 °F (36.3 °C) 99 %      MAP       --         Physical Exam    Nursing note and vitals reviewed.  Constitutional: She appears well-developed and well-nourished. She is not diaphoretic. No distress. Cervical collar in place.   HENT:   Head: Normocephalic and atraumatic.   Nose: Nose normal.   Eyes: Conjunctivae and EOM are normal. Pupils are equal, round, and reactive to light.   Neck: Neck supple.   Cardiovascular:  Normal rate, regular rhythm, normal heart sounds and intact distal pulses.           Pulmonary/Chest: Breath sounds normal. No respiratory distress.   Abdominal: Abdomen is soft.   Musculoskeletal:      Cervical back: Neck supple.      Right lower leg: No edema.      Left lower leg: No edema.      Comments: No C-spine, T-spine, L-spine TTP     Neurological: She is alert and oriented to person, place, and time. She has normal strength. She exhibits normal muscle tone. Gait normal. GCS eye subscore is 4. GCS verbal subscore is 4. GCS motor subscore is 6.   Oriented to person and place. Alert. Responds appropriately to questions.    Skin: No rash noted.   Psychiatric: She has a normal mood and affect. Her speech is normal and behavior is normal. Judgment and thought content normal.         ED Course   Procedures  Labs Reviewed   CBC W/ AUTO DIFFERENTIAL - Abnormal; Notable for the following components:       Result Value    RBC 3.56 (*)     Hematocrit 36.4 (*)      (*)     MCH 33.7 (*)      Immature Granulocytes 0.6 (*)     Lymph # 0.8 (*)     Gran % 75.6 (*)     Lymph % 15.7 (*)     All other components within normal limits   COMPREHENSIVE METABOLIC PANEL - Abnormal; Notable for the following components:    Sodium 130 (*)     Glucose 118 (*)     eGFR 51.4 (*)     All other components within normal limits   URINALYSIS, REFLEX TO URINE CULTURE - Abnormal; Notable for the following components:    Color, UA Colorless (*)     Occult Blood UA Trace (*)     All other components within normal limits    Narrative:     Specimen Source->Urine   POCT GLUCOSE - Abnormal; Notable for the following components:    POCT Glucose 128 (*)     All other components within normal limits   LEVETIRACETAM  (KEPPRA) LEVEL     EKG Readings: (Independently Interpreted)   Initial Reading: No STEMI. Rhythm: Normal Sinus Rhythm. Heart Rate: 96. Axis: Normal. Clinical Impression: Normal Sinus Rhythm     ECG Results              EKG 12-lead (Final result)        Collection Time Result Time QRS Duration OHS QTC Calculation    03/11/24 20:45:13 03/12/24 15:27:14 68 458                     Final result by Interface, Lab In [facility] (03/12/24 15:27:17)                   Narrative:    Test Reason : R56.9,    Vent. Rate : 102 BPM     Atrial Rate : 102 BPM     P-R Int : 134 ms          QRS Dur : 068 ms      QT Int : 352 ms       P-R-T Axes : 058 021 035 degrees     QTc Int : 458 ms    Sinus tachycardia with Premature atrial complexes  Otherwise normal ECG  When compared with ECG of 11-MAR-2024 15:56,  Premature atrial complexes are now Present  Confirmed by RICHARD HAND MD ([id number]) on 3/12/2024 3:27:12 PM    Referred By: [email or web address]   SELF           Confirmed By:RICHARD HAND MD                                     EKG 12-lead (Final result)        Collection Time Result Time QRS Duration OHS QTC Calculation    03/11/24 15:56:47 03/12/24 13:21:54 68 467                     Final result by Interface, Lab In [facility] (03/12/24 13:21:59)                    Narrative:    Test Reason : R00.0,    Vent. Rate : 096 BPM     Atrial Rate : 096 BPM     P-R Int : 138 ms          QRS Dur : 068 ms      QT Int : 370 ms       P-R-T Axes : 047 -05 024 degrees     QTc Int : 467 ms    Normal sinus rhythm  Nonspecific T wave abnormality  Abnormal ECG    When compared with ECG of 06-DEC-2023 14:23,  T wave voltage lower Since previous tracing  Confirmed by Kristina LOPEZ, David ([id number]) on 3/12/2024 1:21:44 PM    Referred By: [email or web address]   SELF           Confirmed By:David Acevedo MD                                  Imaging Results              CT Cervical Spine Without Contrast (Final result)  Result time 03/11/24 21:15:11      Final result by Yayo Luis MD (03/11/24 21:15:11)                   Impression:      Straightened cervical lordosis.    No CT evidence of acute fracture deformity or acute traumatic vertebral malalignment in the cervicocranium, cervical spine, or cervicothoracic junction.    Pre-existing findings include multilevel degenerative changes, C3 through C7 laminectomy with bilateral posterior instrumented fusion, C2-C3 grade 1/2 spondylolisthesis, and possibly also an old healed fracture in the left C2 posterior elements.      Electronically signed by: Yayo Luis  Date:    03/11/2024  Time:    21:15               Narrative:    EXAMINATION:  CT CERVICAL SPINE WITHOUT CONTRAST    CLINICAL HISTORY:  Trauma;    TECHNIQUE:  Low dose axial images, sagittal and coronal reformations were performed though the cervical spine.  Contrast was not administered.    COMPARISON:  Cervical spine CT and cervical spine x-rays, all obtained 10/27/2022    FINDINGS:  Artifacts related to beam hardening and/or motion degrade portions of the scan.    Pre-existing multilevel hypertrophic degenerative changes.  Some of these again demonstrated an irregular and fragmented appearance that remains similar to the comparison images.    Status post C3 through C7 laminectomies with  bilateral posterior instrumented C3-C7 fusion with bilateral vertical bars and bilateral C3 through C7 posterior pedicle screws.    A linear lucency projecting across a portion of the left C2 posterior elements was present previously and now demonstrates corticated margins and increased surrounding intraosseous sclerosis, possibly representing an old fracture with some interval intraosseous callus formation.    There is pre-existing grade 1/grade 2 C2-C3 spondylolisthesis, likely on the basis of the advanced degenerative facet changes.    C2-C3 disc space narrowing and vacuum disc phenomenon.  Adjacent vertebral endplate sclerosis irregular is where present previously and likely represent Modic changes.    Straightened cervical lordosis.    The C2-C3 spondylolisthesis, combined with multilevel uncovertebral and facet hypertrophy produce varying degrees of multilevel C2-C3 through C6 high C7 bilateral neural foraminal stenosis most severe at C2-C3 bilaterally, C4-5 bilaterally, and C5-6 on the left.  Small to moderate-sized posterior osteophytes or posterior disc-osteophyte complexes at C5-6 and C6-7 protrude mildly into the vertebral canal.  However, intraspinal soft tissue detail is suboptimally visualized, limiting assessment of the cervical spinal cord on this scan.    There is no evidence of acute cervical vertebral fracture deformity or acute traumatic vertebral malalignment in the cervical spine.    No cervical prevertebral soft tissue swelling.    No evidence of cervical airway occlusion per    There remain asymmetric effacement of the left piriform sinus, similar to the comparison scan, likely related to the medial partial retropharyngeal course of the left cervical carotid artery.    No suspicious thyroid masses.    Minimal intrathoracic and cervical atherosclerotic calcification.    Visualized lung apices demonstrate minimal interlobular septal thickening for which mild interstitial pulmonary edema is one  of numerous differential diagnostic considerations.    Other, more coarse, biapical opacities, right greater than left, were present previously and most likely represent scarring/chronic fibronodular changes.                                       CT Head Without Contrast (Final result)  Result time 03/11/24 20:11:42      Final result by Arian Ann MD (03/11/24 20:11:42)                   Impression:      No acute intracranial abnormality.    Changes of chronic microvascular ischemic disease with cerebral volume loss and right basal ganglia remote lacunar type infarct, similar to prior.      Electronically signed by: Arian Ann MD  Date:    03/11/2024  Time:    20:11               Narrative:    EXAMINATION:  CT HEAD WITHOUT CONTRAST    CLINICAL HISTORY:  Trauma;    TECHNIQUE:  Low dose axial CT images obtained throughout the head without intravenous contrast. Sagittal and coronal reconstructions were performed.    COMPARISON:  Head CT 12/06/2023 and MRI brain 07/09/2021    FINDINGS:  Intracranial compartment:    Age-related generalized cerebral volume loss.  Ventricles are midline and stable in size and configuration without distortion by mass effect or acute hydrocephalus.  No extra-axial blood or fluid collections.    Grossly stable patchy and confluent hypoattenuation of the supratentorial white matter consistent with chronic microvascular ischemic change.  Right basal ganglia remote lacunar type infarct unchanged.  No new focal areas of abnormal parenchymal attenuation.  Skull base atherosclerotic vascular calcifications noted.  No parenchymal mass, hemorrhage, edema or major vascular distribution infarct.    Skull/extracranial contents (limited evaluation): No fracture. Mastoid air cells and paranasal sinuses are essentially clear.  Suspected remote operative change of the bilateral ocular lenses.                                       X-Ray Pelvis Routine AP (Final result)  Result time 03/11/24 18:38:53       Final result by Jacob Aviles DO (03/11/24 18:38:53)                   Impression:      No acute fracture or dislocation.      Electronically signed by: Jacob Aviles  Date:    03/11/2024  Time:    18:38               Narrative:    EXAMINATION:  XR PELVIS ROUTINE AP    CLINICAL HISTORY:  r/o bleeding or hemorrhage;    TECHNIQUE:  AP view of the pelvis was performed.    COMPARISON:  None.    FINDINGS:  There is diffuse osteopenia.  Alignment is normal.  There is no evidence of acute fracture or dislocation of the pelvis on this single, limited view.  There are surgical clips.  There are degenerative changes of the bilateral femoroacetabular joints.                                       X-Ray Chest 1 View (Final result)  Result time 03/11/24 18:39:19      Final result by Jacob Aviles DO (03/11/24 18:39:19)                   Impression:      No acute abnormality.      Electronically signed by: Jacob Aviles  Date:    03/11/2024  Time:    18:39               Narrative:    EXAMINATION:  XR CHEST 1 VIEW    CLINICAL HISTORY:  r/o bleeding or hemorrhage;    TECHNIQUE:  Single frontal view of the chest was performed.    COMPARISON:  12/06/2023.    FINDINGS:  The lungs are well expanded and clear. No focal opacities are seen. The pleural spaces are clear. The cardiac silhouette is unremarkable. The visualized osseous structures demonstrate degenerative changes.  There is cervical spine hardware.  There are upper quadrant surgical clips.                                       Medications   morphine injection 2 mg (2 mg Intravenous Given 3/11/24 1739)   levETIRAcetam injection 1,500 mg (1,500 mg Intravenous Given 3/11/24 2029)   acetaminophen tablet 1,000 mg (1,000 mg Oral Given 3/11/24 2028)   carvediloL tablet 25 mg (25 mg Oral Given 3/11/24 2026)   tiZANidine tablet 2 mg (2 mg Oral Given 3/11/24 2132)     Medical Decision Making  78 y.o. female with a PMHx of epilepsy, CKD, anemia, T2DM, PE (on eliquis)  presents to the ED s/p seizure. Nontoxic appearing. Vitals with HTN. Afebrile. Exam as above. I will initiate workup and reassess.    History of seizure-like activity.  Per her daughter and patient she was back to her baseline.  Overall well-appearing. She is alert and oriented to person and place which is normal for her.  Patient is unsure if she took her Keppra.  Her daughter notes she missed a dose yesterday.  History shows frequent missed doses.  I will Load with Keppra at this time.  Pending Keppra level    Labs without leukocytosis. H&H stable. Mild hyponatremia.     CTH negative for intracranial hemorrhage.  C-spine negative for fracture or dislocation.  No pelvis fracture on x-ray.    She is unsure with the last time she took her Coreg.  She developed hypertension and tachycardia up to 120s.  EKG repeated with sinus tachycardia with PACs. Dose of Coreg given.  She was also given her tizanidine as she was muscle spasms in her legs that are quite painful.  After medication, blood pressure and pulse have improved.     Amount and/or Complexity of Data Reviewed  Labs:  Decision-making details documented in ED Course.    Risk  OTC drugs.  Prescription drug management.              Attending Attestation:             Attending ED Notes:   I discussed the patient's care with Advanced Practice Clinician, and did see this patient with the APC.  I reviewed their note and agree with the history, physical, assessment, diagnosis, treatment, and admission plan provided by the Advanced Practice Clinician.  I have performed a substantial part of the MDM.    Labs reviewed, sodium 130- similar to previous.  Labs otherwise unremarkable.  Returned to baseline mental status.  Reported bilateral leg spasms, missed her dose of Zanaflex.    CT Head reviewed with no acute intracranial hemorrhage.    Patient loaded with Keppra as she missed several doses.    Stable for discharge, follow-up with Neurology    KIRSTY Núñez MD  Staff ED  Physician  03/12/2024 2:47 PM            ED Course as of 03/12/24 1753   Mon Mar 11, 2024   1918 Sodium(!): 130 []   1918 Potassium: 4.4 []   1918 Hemoglobin: 12.0 [HM]   1918 Hematocrit(!): 36.4 [HM]   1918 WBC: 5.08 []   2021 NITRITE UA: Negative []   2021 Leukocyte Esterase, UA: Negative []   2021 Blood, UA(!): Trace []   2028 Elevated blood pressure noted. She is unsure if she took her blood pressure today. Newly tachycardic. Will obtain EKG and given BP medications []      ED Course User Index  [] Rosie Workman PA-C                             Clinical Impression:  Final diagnoses:  [R56.9] Seizures  [R00.0] Tachycardia  [G40.919] Breakthrough seizure (Primary)  [I10] Hypertension, unspecified type  [R51.9] Nonintractable headache, unspecified chronicity pattern, unspecified headache type          ED Disposition Condition    Discharge Stable          ED Prescriptions    None       Follow-up Information       Follow up With Specialties Details Why Contact Info    Micaela Mendoza MD Family Medicine Schedule an appointment as soon as possible for a visit   [address] [facility] [id number] [phone]      [facility] - Emergency Dept Emergency Medicine  If symptoms worsen [id number]  [facility] [id number] [phone]             Rosie Workman PA-C  03/12/24 1754

## 2024-03-11 NOTE — TELEPHONE ENCOUNTER
I want to see her before I place the referrals, may use urgent, same day slot we have not discussed these problems in detail because her rheumatologist manages these

## 2024-03-11 NOTE — FIRST PROVIDER EVALUATION
"Medical screening examination initiated.  I have conducted a focused provider triage encounter, findings are as follows:    Brief history of present illness:  70-year-old female with history of seizure disorder on Keppra.  Daughter reports that she left the house for about 10 minutes and when she returned she found the patient on the floor in her typical postictal state.  She reports that the patient is anticoagulated she is concerned about her hitting her head.  Additionally, she reports a prior history of cervical spine fracture when the patient fell.    Vitals:    03/11/24 1547   BP: (!) 140/86   Pulse: 92   Resp: 20   Temp: 97.4 °F (36.3 °C)   TempSrc: Oral   SpO2: 99%   Weight: 59.4 kg (131 lb)   Height: 5' 2" (1.575 m)       Pertinent physical exam:  Alert.  Long-term memory deficits.  No memory of episode.  No significant cephalohematoma.  C-collar in place.    Brief workup plan:  CT brain and cervical spine.  X-ray of chest and pelvis due to trauma.  Keppra level.  Lab assays to assess for possible seizure triggers    Preliminary workup initiated; this workup will be continued and followed by the physician or advanced practice provider that is assigned to the patient when roomed.  "

## 2024-03-12 ENCOUNTER — PATIENT OUTREACH (OUTPATIENT)
Dept: EMERGENCY MEDICINE | Facility: HOSPITAL | Age: 79
End: 2024-03-12
Payer: MEDICARE

## 2024-03-12 LAB
OHS QRS DURATION: 68 MS
OHS QRS DURATION: 68 MS
OHS QTC CALCULATION: 458 MS
OHS QTC CALCULATION: 467 MS

## 2024-03-12 NOTE — PROGRESS NOTES
Spoke with Pt and her daughter who states she is doing better and is laying down resting. She has a Neurology appt 4-8-24. Pt's daughter or son will take her to appt's. She denies having any concerns at this time. ED Navigator will continue to follow up and remind of appt's.

## 2024-03-12 NOTE — ED NOTES
Regino Lawrence, a 78 y.o. female presents to the ED w/ complaint of possible seizure. Found down at home, postictal on EMS arrival.  Hx: seizures.    Triage note:  Chief Complaint   Patient presents with    Seizures     Arrival via ems, coming from home, found on ground by family, postictal on ems arrival, GCS9, woke up in route, GCS of 14 now, pt in c-collar, hit head and on blood thinners. 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, without long-term current use of insulin     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, without long-term current use of insulin     Depression     Diabetes mellitus, type 2     Eye injury as a child     k-abrasion  od    Hyperlipidemia     Hypertension     Hypothyroidism     Immune deficiency disorder     Immune disorder     LOC (loss of consciousness) 03/12/2021    at home    Myalgia and myositis 09/06/2012    Osteoporosis     Polyneuropathy     Pulmonary embolism 07/10/2021    Renal manifestation of secondary diabetes mellitus     Sarcoidosis     Seizure     Type 2 diabetes mellitus     Ulcer     no cancer    Urinary incontinence        Patient identifiers verified and correct for   LOC: The patient is awake, alert and aware of environment with an appropriate affect, the patient is oriented x 3 and speaking appropriately.   APPEARANCE: Patient appears comfortable and in no acute distress, patient is clean and well groomed.  SKIN: The skin is warm and dry, color consistent with ethnicity, patient has normal skin  turgor and moist mucus membranes, skin intact, no breakdown or bruising noted.   MUSCULOSKELETAL: Patient moving all extremities spontaneously, no swelling noted.  RESPIRATORY: Airway is open and patent, respirations are spontaneous, patient has a normal effort and rate, no accessory muscle use noted, pt placed on continuous pulse ox with O2 sats noted at 99% on room air.  CARDIAC: Pt placed on cardiac monitor. Patient has a normal rate and regular rhythm, no edema noted, capillary refill < 3 seconds.   GASTRO: Soft and non tender to palpation, no distention noted, normoactive bowel sounds present in all four quadrants. Pt states bowel movements have been regular.  : Pt denies any pain or frequency with urination.  NEURO: Pt opens eyes spontaneously, behavior appropriate to situation, follows commands, facial expression symmetrical, bilateral hand grasp equal and even, purposeful motor response noted, normal sensation in all extremities when touched with a finger. EMS placed c-collar.

## 2024-03-13 ENCOUNTER — OFFICE VISIT (OUTPATIENT)
Dept: URGENT CARE | Facility: CLINIC | Age: 79
End: 2024-03-13
Payer: MEDICARE

## 2024-03-13 VITALS
TEMPERATURE: 98 F | BODY MASS INDEX: 24.11 KG/M2 | HEIGHT: 62 IN | SYSTOLIC BLOOD PRESSURE: 167 MMHG | HEART RATE: 91 BPM | OXYGEN SATURATION: 95 % | RESPIRATION RATE: 15 BRPM | WEIGHT: 131 LBS | DIASTOLIC BLOOD PRESSURE: 80 MMHG

## 2024-03-13 DIAGNOSIS — J02.9 SORE THROAT: ICD-10-CM

## 2024-03-13 DIAGNOSIS — J02.9 VIRAL PHARYNGITIS: Primary | ICD-10-CM

## 2024-03-13 LAB
CTP QC/QA: YES
MOLECULAR STREP A: NEGATIVE

## 2024-03-13 PROCEDURE — 99213 OFFICE O/P EST LOW 20 MIN: CPT | Mod: S$GLB,,, | Performed by: NURSE PRACTITIONER

## 2024-03-13 PROCEDURE — 87651 STREP A DNA AMP PROBE: CPT | Mod: QW,S$GLB,, | Performed by: NURSE PRACTITIONER

## 2024-03-13 RX ORDER — ACETAMINOPHEN AND CODEINE PHOSPHATE 300; 30 MG/1; MG/1
1 TABLET ORAL EVERY 6 HOURS PRN
COMMUNITY
Start: 2023-12-11

## 2024-03-13 RX ORDER — TRIAZOLAM 0.25 MG/1
TABLET ORAL
COMMUNITY
Start: 2023-12-11

## 2024-03-13 RX ORDER — LINACLOTIDE 72 UG/1
72 CAPSULE, GELATIN COATED ORAL EVERY MORNING
COMMUNITY
Start: 2024-01-11

## 2024-03-13 RX ORDER — ACETAMINOPHEN 500 MG
1000 TABLET ORAL
Status: COMPLETED | OUTPATIENT
Start: 2024-03-13 | End: 2024-03-13

## 2024-03-13 RX ORDER — FLUTICASONE PROPIONATE 50 MCG
1 SPRAY, SUSPENSION (ML) NASAL DAILY
Qty: 9.9 ML | Refills: 0 | Status: SHIPPED | OUTPATIENT
Start: 2024-03-13 | End: 2024-03-20

## 2024-03-13 RX ADMIN — Medication 1000 MG: at 02:03

## 2024-03-13 NOTE — PATIENT INSTRUCTIONS
Discharge instructions for Viral Pharyngitis    Recommend throat lozenges  Chloraseptic spray for discomfort.    - Rest.    - Drink plenty of fluids.  - Viral upper respiratory infections typically run their course in 10-14 days.     - Tylenol (acetaminophen) or Ibuprofen as directed as needed for fever/pain. Avoid tylenol if you have a history of liver disease. Do not take ibuprofen if you have a history of GI bleeding, kidney disease, gastric surgery, or if you take blood thinners.     - You can take over-the-counter claritin, zyrtec, allegra, or xyzal as directed. These are antihistamines that can help with runny nose, nasal congestion, sneezing, and helps to dry up post-nasal drip, which usually causes sore throat and cough.   - If you do NOT have high blood pressure, you may use a decongestant form (D)  of this medication (ie. Claritin- D, zyrtec-D, allegra-D) or if you do not take the D form, you can take sudafed (pseudoephedrine) over the counter, which is a decongestant. Do NOT take two decongestant (D) medications at the same time (such as mucinex-D and claritin-D or plain sudafed and claritin D)    - You can use Flonase (fluticasone) nasal spray as directed for sinus congestion and postnasal drip. This is a steroid nasal spray that works locally over time to decrease the inflammation in your nose/sinuses and help with allergic symptoms. This is not an quick- relief spray like afrin, but it works well if used daily.  Discontinue if you develop nose bleed  - use nasal saline prior to Flonase.  - Use Ocean Spray Nasal Saline 1-3 puffs each nostril every 2-3 hours then blow out onto tissue. This is to irrigate the nasal passage way to clear the sinus openings. Use until sinus problem resolved.    - you can take plain Mucinex (guaifenesin) 1200 mg twice a day to help loosen mucous.     -warm salt water gargles can help with sore throat    - warm tea with honey can help with cough. Honey is a natural cough  suppressant.    - Dextromethorphan (DM) is a cough suppressant over the counter (ie. mucinex DM, robitussin, delsym; dayquil/nyquil has DM as well.)    - Follow up with your PCP or specialty clinic as directed in the next 1-2 weeks if not improved or as needed.  You can call [phone] to schedule an appointment with the appropriate provider.      - Go to the ER if you develop new or worsening symptoms.     - You must understand that you have received an Urgent Care treatment only and that you may be released before all of your medical problems are known or treated.   - You, the patient, will arrange for follow up care as instructed.   - If your condition worsens or fails to improve we recommend that you receive another evaluation at the ER immediately or contact your PCP to discuss your concerns or return here.

## 2024-03-13 NOTE — PROGRESS NOTES
"Subjective:      Patient ID: Regino Lawrence is a 78 y.o. female.    Vitals:  height is 5' 2" (1.575 m) and weight is 59.4 kg (131 lb). Her temperature is 98 °F (36.7 °C). Her blood pressure is 167/80 (abnormal) and her pulse is 91. Her respiration is 15 and oxygen saturation is 95%.     Chief Complaint: Sore Throat    Pt c/o sore throat onset this morning. Pt sts she was at ED last night after having a seizure and falling. Pt has not done anything for relief. Pt reports this is new occurrence, discomfort with swallowing. Daughter lives with pt. Pt reports no seizures since last night and seen in ER. Pt reports use of wheelchair due to osteoarthritis.    Sore Throat   This is a new problem. The current episode started today. There has been no fever. The pain is at a severity of 10/10. The pain is severe. Associated symptoms include headaches and trouble swallowing. Pertinent negatives include no abdominal pain, congestion, coughing, diarrhea, drooling, ear discharge, ear pain, hoarse voice, plugged ear sensation, neck pain, shortness of breath, stridor, swollen glands or vomiting. She has had no exposure to strep or mono. She has tried gargles for the symptoms. The treatment provided no relief.       Constitution: Negative for chills, sweating, fatigue and fever.   HENT:  Positive for sore throat and trouble swallowing. Negative for ear pain, ear discharge, drooling and congestion.    Neck: Negative for neck pain and neck stiffness.   Cardiovascular:  Negative for chest pain, leg swelling, palpitations and sob on exertion.   Eyes:  Negative for eye pain, eye redness and vision loss.   Respiratory:  Negative for cough, sputum production, shortness of breath and stridor.    Gastrointestinal:  Negative for abdominal pain, nausea, vomiting, constipation and diarrhea.   Genitourinary:  Negative for dysuria, frequency, urgency, flank pain and hematuria.   Musculoskeletal:  Negative for pain and trauma.   Skin:  Negative " for color change and rash.   Neurological:  Positive for headaches. Negative for dizziness and disorientation.   Psychiatric/Behavioral:  Negative for disorientation.       Objective:     Physical Exam   Constitutional: She is oriented to person, place, and time. She appears well-developed. She is cooperative.  Non-toxic appearance. She does not appear ill. No distress. normalawake  HENT:   Head: Normocephalic and atraumatic.   Ears:   Right Ear: Hearing, tympanic membrane, external ear and ear canal normal.   Left Ear: Hearing, tympanic membrane, external ear and ear canal normal.   Nose: Nose normal. No mucosal edema, rhinorrhea or nasal deformity. No epistaxis. Right sinus exhibits no maxillary sinus tenderness and no frontal sinus tenderness. Left sinus exhibits no maxillary sinus tenderness and no frontal sinus tenderness.   Mouth/Throat: Uvula is midline and mucous membranes are normal. No trismus in the jaw. Normal dentition. No uvula swelling. Posterior oropharyngeal erythema and cobblestoning present. No oropharyngeal exudate or posterior oropharyngeal edema.   Eyes: Conjunctivae and lids are normal. No scleral icterus.   Neck: Trachea normal and phonation normal. Neck supple. No thyromegaly present. No edema present. No erythema present. No neck rigidity present.   Cardiovascular: Normal rate, regular rhythm, S1 normal, S2 normal, normal heart sounds and normal pulses.   Pulmonary/Chest: Effort normal and breath sounds normal. No respiratory distress. She has no decreased breath sounds. She has no wheezes. She has no rhonchi.   Abdominal: Normal appearance and bowel sounds are normal. protuberant There is no abdominal tenderness.   Musculoskeletal: Normal range of motion.         General: No deformity. Normal range of motion.      Right lower leg: No edema.      Left lower leg: No edema.   Lymphadenopathy:     She has no cervical adenopathy.   Neurological: She is alert and oriented to person, place, and  time. She exhibits normal muscle tone. Coordination normal.   Skin: Skin is warm, dry, intact, not diaphoretic and not pale.   Psychiatric: Her speech is normal and behavior is normal. Judgment and thought content normal.   Nursing note and vitals (Pt is awake and alert, no acute distress,  use of wheelchair,) reviewed.    Results for orders placed or performed in visit on 03/13/24   POCT Strep A, Molecular   Result Value Ref Range    Molecular Strep A, POC Negative Negative     Acceptable Yes      *Note: Due to a large number of results and/or encounters for the requested time period, some results have not been displayed. A complete set of results can be found in Results Review.         Assessment:     1. Viral pharyngitis    2. Sore throat        Plan:       Viral pharyngitis  -     fluticasone propionate (FLONASE) 50 mcg/actuation nasal spray; 1 spray (50 mcg total) by Each Nostril route once daily. for 7 days  Dispense: 9.9 mL; Refill: 0    Sore throat  -     POCT Strep A, Molecular  -     acetaminophen tablet 1,000 mg        Patient Instructions   Discharge instructions for Viral Pharyngitis    Recommend throat lozenges  Chloraseptic spray for discomfort.    - Rest.    - Drink plenty of fluids.  - Viral upper respiratory infections typically run their course in 10-14 days.     - Tylenol (acetaminophen) or Ibuprofen as directed as needed for fever/pain. Avoid tylenol if you have a history of liver disease. Do not take ibuprofen if you have a history of GI bleeding, kidney disease, gastric surgery, or if you take blood thinners.     - You can take over-the-counter claritin, zyrtec, allegra, or xyzal as directed. These are antihistamines that can help with runny nose, nasal congestion, sneezing, and helps to dry up post-nasal drip, which usually causes sore throat and cough.   - If you do NOT have high blood pressure, you may use a decongestant form (D)  of this medication (ie. Claritin- D,  zyrtec-D, allegra-D) or if you do not take the D form, you can take sudafed (pseudoephedrine) over the counter, which is a decongestant. Do NOT take two decongestant (D) medications at the same time (such as mucinex-D and claritin-D or plain sudafed and claritin D)    - You can use Flonase (fluticasone) nasal spray as directed for sinus congestion and postnasal drip. This is a steroid nasal spray that works locally over time to decrease the inflammation in your nose/sinuses and help with allergic symptoms. This is not an quick- relief spray like afrin, but it works well if used daily.  Discontinue if you develop nose bleed  - use nasal saline prior to Flonase.  - Use Ocean Spray Nasal Saline 1-3 puffs each nostril every 2-3 hours then blow out onto tissue. This is to irrigate the nasal passage way to clear the sinus openings. Use until sinus problem resolved.    - you can take plain Mucinex (guaifenesin) 1200 mg twice a day to help loosen mucous.     -warm salt water gargles can help with sore throat    - warm tea with honey can help with cough. Honey is a natural cough suppressant.    - Dextromethorphan (DM) is a cough suppressant over the counter (ie. mucinex DM, robitussin, delsym; dayquil/nyquil has DM as well.)    - Follow up with your PCP or specialty clinic as directed in the next 1-2 weeks if not improved or as needed.  You can call [phone] to schedule an appointment with the appropriate provider.      - Go to the ER if you develop new or worsening symptoms.     - You must understand that you have received an Urgent Care treatment only and that you may be released before all of your medical problems are known or treated.   - You, the patient, will arrange for follow up care as instructed.   - If your condition worsens or fails to improve we recommend that you receive another evaluation at the ER immediately or contact your PCP to discuss your concerns or return here.

## 2024-03-14 LAB — LEVETIRACETAM SERPL-MCNC: 16.8 UG/ML (ref 3–60)

## 2024-03-18 ENCOUNTER — HOSPITAL ENCOUNTER (OUTPATIENT)
Dept: RADIOLOGY | Facility: HOSPITAL | Age: 79
Discharge: HOME | End: 2024-03-18
Attending: FAMILY MEDICINE
Payer: MEDICARE

## 2024-03-18 ENCOUNTER — OFFICE VISIT (OUTPATIENT)
Dept: FAMILY MEDICINE | Facility: CLINIC | Age: 79
End: 2024-03-18
Payer: MEDICARE

## 2024-03-18 VITALS
DIASTOLIC BLOOD PRESSURE: 80 MMHG | HEIGHT: 62 IN | BODY MASS INDEX: 24.78 KG/M2 | HEART RATE: 88 BPM | OXYGEN SATURATION: 97 % | WEIGHT: 134.69 LBS | RESPIRATION RATE: 18 BRPM | SYSTOLIC BLOOD PRESSURE: 148 MMHG | TEMPERATURE: 98 F

## 2024-03-18 DIAGNOSIS — M79.672 FOOT PAIN, BILATERAL: ICD-10-CM

## 2024-03-18 DIAGNOSIS — M54.42 CHRONIC BILATERAL LOW BACK PAIN WITH LEFT-SIDED SCIATICA: ICD-10-CM

## 2024-03-18 DIAGNOSIS — M25.542 JOINT PAIN IN BOTH HANDS: ICD-10-CM

## 2024-03-18 DIAGNOSIS — M79.671 FOOT PAIN, BILATERAL: ICD-10-CM

## 2024-03-18 DIAGNOSIS — G89.29 CHRONIC BILATERAL LOW BACK PAIN WITH LEFT-SIDED SCIATICA: ICD-10-CM

## 2024-03-18 DIAGNOSIS — M25.541 JOINT PAIN IN BOTH HANDS: ICD-10-CM

## 2024-03-18 DIAGNOSIS — I10 ESSENTIAL HYPERTENSION: ICD-10-CM

## 2024-03-18 DIAGNOSIS — D86.9 SARCOIDOSIS: Primary | ICD-10-CM

## 2024-03-18 DIAGNOSIS — R56.9 SEIZURE: ICD-10-CM

## 2024-03-18 PROCEDURE — 73630 X-RAY EXAM OF FOOT: CPT | Mod: TC,FY,LT

## 2024-03-18 PROCEDURE — 99999 PR PBB SHADOW E&M-EST. PATIENT-LVL V: CPT | Mod: PBBFAC,,, | Performed by: FAMILY MEDICINE

## 2024-03-18 PROCEDURE — 1100F PTFALLS ASSESS-DOCD GE2>/YR: CPT | Mod: CPTII,S$GLB,, | Performed by: FAMILY MEDICINE

## 2024-03-18 PROCEDURE — 1157F ADVNC CARE PLAN IN RCRD: CPT | Mod: CPTII,S$GLB,, | Performed by: FAMILY MEDICINE

## 2024-03-18 PROCEDURE — 3072F LOW RISK FOR RETINOPATHY: CPT | Mod: CPTII,S$GLB,, | Performed by: FAMILY MEDICINE

## 2024-03-18 PROCEDURE — 3288F FALL RISK ASSESSMENT DOCD: CPT | Mod: CPTII,S$GLB,, | Performed by: FAMILY MEDICINE

## 2024-03-18 PROCEDURE — 1159F MED LIST DOCD IN RCRD: CPT | Mod: CPTII,S$GLB,, | Performed by: FAMILY MEDICINE

## 2024-03-18 PROCEDURE — 73130 X-RAY EXAM OF HAND: CPT | Mod: 26,50,, | Performed by: RADIOLOGY

## 2024-03-18 PROCEDURE — 1125F AMNT PAIN NOTED PAIN PRSNT: CPT | Mod: CPTII,S$GLB,, | Performed by: FAMILY MEDICINE

## 2024-03-18 PROCEDURE — 99214 OFFICE O/P EST MOD 30 MIN: CPT | Mod: S$GLB,,, | Performed by: FAMILY MEDICINE

## 2024-03-18 PROCEDURE — 73630 X-RAY EXAM OF FOOT: CPT | Mod: 26,LT,, | Performed by: RADIOLOGY

## 2024-03-18 PROCEDURE — 3079F DIAST BP 80-89 MM HG: CPT | Mod: CPTII,S$GLB,, | Performed by: FAMILY MEDICINE

## 2024-03-18 PROCEDURE — 3077F SYST BP >= 140 MM HG: CPT | Mod: CPTII,S$GLB,, | Performed by: FAMILY MEDICINE

## 2024-03-18 PROCEDURE — 73130 X-RAY EXAM OF HAND: CPT | Mod: TC,50,FY

## 2024-03-18 RX ORDER — DULOXETIN HYDROCHLORIDE 60 MG/1
60 CAPSULE, DELAYED RELEASE ORAL DAILY
Qty: 90 CAPSULE | Refills: 1 | Status: SHIPPED | OUTPATIENT
Start: 2024-03-18 | End: 2025-03-18

## 2024-03-18 NOTE — PROGRESS NOTES
Chief Complaint   Patient presents with    Hand Pain     Joints swell and they get inflamed x chronic [years]    Foot Pain     Bilateral - joints swelling and pain x chronic [years]       HPI  Regino Lawrence is a 78 y.o. female with multiple medical diagnoses as listed in the medical history and problem list that presents for evaluation for chronic pain in her hands and feet    Hand and foot pain- associated with swelling, does not describe numbness or tingling, poss hx of neuropathy from diabetes, her hands lock up and its hard to walk on her feet  Seizure d/o- had a breakthrough seizure on keppra at therapeutic dosing      ALLERGIES AND MEDICATIONS: updated and reviewed.  Review of patient's allergies indicates:   Allergen Reactions    Azathioprine Shortness Of Breath and Other (See Comments)     Fatigue     Medication List with Changes/Refills   New Medications    DULOXETINE (CYMBALTA) 60 MG CAPSULE    Take 1 capsule (60 mg total) by mouth once daily.   Current Medications    ACCU-CHEK FASTCLIX LANCING DEV KIT    USE AS DIRECTED.    ACETAMINOPHEN-CODEINE 300-30MG (TYLENOL #3) 300-30 MG TAB    Take 1 tablet by mouth every 6 (six) hours as needed.    ALBUTEROL-IPRATROPIUM (DUO-NEB) 2.5 MG-0.5 MG/3 ML NEBULIZER SOLUTION    Take 3 mLs by nebulization every 4 (four) hours as needed for Wheezing or Shortness of Breath. Rescue    ALCOHOL ANTISEPTIC PADS (ALCOHOL PREP PADS TOP)        APIXABAN (ELIQUIS) 5 MG TAB    Take 1 tablet (5 mg total) by mouth 2 (two) times daily. Start this prescription after finishing starter pack    ATORVASTATIN (LIPITOR) 20 MG TABLET    Take 1 tablet (20 mg total) by mouth once daily.    BLOOD SUGAR DIAGNOSTIC (ACCU-CHEK SMARTVIEW TEST STRIP) STRP    Use as directed to check blood sugar twice daily.    BLOOD SUGAR DIAGNOSTIC STRP    To check BG three times daily, to use with insurance preferred meter    BLOOD-GLUCOSE METER KIT    Use as instructed    CALCIUM CARBONATE (OS-MALCOLM) 600 MG CALCIUM  "(1,500 MG) TAB    Take 1 tablet by mouth 2 (two) times daily.     CARVEDILOL (COREG) 25 MG TABLET    Take 1 tablet (25 mg total) by mouth 2 (two) times daily.    CETIRIZINE (ZYRTEC) 10 MG TABLET    Take 1 tablet (10 mg total) by mouth once daily.    DICLOFENAC SODIUM (VOLTAREN) 1 % GEL    Apply 2 g topically once daily.    FENOFIBRATE 160 MG TAB    Take 1 tablet (160 mg total) by mouth once daily.    FLUTICASONE PROPION-SALMETEROL 115-21 MCG/DOSE (ADVAIR HFA) 115-21 MCG/ACTUATION HFAA INHALER    Inhale 2 puffs into the lungs every 12 (twelve) hours. Controller    FLUTICASONE PROPIONATE (FLONASE) 50 MCG/ACTUATION NASAL SPRAY    1 spray (50 mcg total) by Each Nostril route once daily. for 7 days    FOLIC ACID (FOLVITE) 1 MG TABLET    Take 1 tablet (1,000 mcg total) by mouth once daily.    HYDRALAZINE (APRESOLINE) 25 MG TABLET    Take 2 tabs every 8 hours by mouth. Check BP 2 hours after each dose and if Blood pressure >160- please take 1 extra tab    INSULIN (LANTUS SOLOSTAR U-100 INSULIN) GLARGINE 100 UNITS/ML SUBQ PEN    Inject 10 Units into the skin every evening.    KLGA KETOPROFEN 10% LIDOCAINE 10% GABAPENTIN 6% AMITRIPTYLINE 2% IN TRANSDERMAL CREAM    Apply 1 to 2 grams 3 to 4 times daily    LEVETIRACETAM (KEPPRA) 500 MG TAB    Take 1 tablet (500 mg total) by mouth 2 (two) times daily.    LEVOTHYROXINE (SYNTHROID) 50 MCG TABLET    Take 1 tablet (50 mcg total) by mouth before breakfast.    LINZESS 72 MCG CAP CAPSULE    Take 72 mcg by mouth every morning.    NIFEDIPINE (PROCARDIA-XL) 90 MG (OSM) 24 HR TABLET    Take 1 tablet (90 mg total) by mouth once daily.    OLOPATADINE (PATANOL) 0.1 % OPHTHALMIC SOLUTION    Place 1 drop into both eyes daily as needed for Allergies.    PANTOPRAZOLE (PROTONIX) 40 MG TABLET    Take 1 tablet (40 mg total) by mouth once daily.    PEN NEEDLE, DIABETIC (NOVOFINE 32) 32 GAUGE X 1/4" NDLE    For use with [employer] pen .    PREDNISONE (DELTASONE) 1 MG TABLET    TAKE 4 TABLETS BY MOUTH " "ONCE DAILY FOR  ONE  MONTH  THEN  DECREASE  BY  1  TABLET  EVERY  MONTH  IF  LABS  ALLOW    TIZANIDINE (ZANAFLEX) 2 MG TABLET    Take 2 tablets (4 mg total) by mouth every 8 (eight) hours as needed (muscle spasm).    TRIAZOLAM (HALCION) 0.25 MG TAB    TAKE TWO CAPSULES BY MOUTH ONE HOUR BEFORE APPOINTMENT WITH SMALL AMOUNT OF WATER AND BRING REMAINING 2 TABLETS TO APPOINTMENT   Discontinued Medications    DULOXETINE (CYMBALTA) 30 MG CAPSULE    Take 1 capsule (30 mg total) by mouth once daily.    EPOETIN WOLFGANG-EPBX (RETACRIT) 10,000 UNIT/ML IMJECTION    Inject 20,000 Units into the skin every 14 (fourteen) days.       ROS  Review of Systems   Constitutional:  Negative for chills, diaphoresis, fatigue, fever and unexpected weight change.   HENT:  Negative for rhinorrhea, sinus pressure, sore throat and tinnitus.    Eyes:  Negative for photophobia and visual disturbance.   Respiratory:  Negative for cough, shortness of breath and wheezing.    Cardiovascular:  Negative for chest pain and palpitations.   Gastrointestinal:  Negative for abdominal pain, blood in stool, constipation, diarrhea, nausea and vomiting.   Genitourinary:  Negative for dysuria, flank pain, frequency and vaginal discharge.   Musculoskeletal:  Positive for arthralgias. Negative for joint swelling.   Skin:  Negative for rash.   Neurological:  Negative for speech difficulty, weakness, light-headedness and headaches.   Psychiatric/Behavioral:  Negative for behavioral problems and dysphoric mood.        Physical Exam  Vitals:    03/18/24 0923   BP: (!) 148/80   BP Location: Right arm   Patient Position: Sitting   BP Method: Small (Manual)   Pulse: 88   Resp: 18   Temp: 98.4 °F (36.9 °C)   TempSrc: Oral   SpO2: 97%   Weight: 61.1 kg (134 lb 11.2 oz)   Height: 5' 2" (1.575 m)    Body mass index is 24.64 kg/m².  Weight: 61.1 kg (134 lb 11.2 oz)   Height: 5' 2" (157.5 cm)     Physical Exam  Vitals and nursing note reviewed.   Constitutional:       Appearance: " She is well-developed.   Musculoskeletal:      Right hand: Swelling, tenderness and bony tenderness present.      Left hand: Tenderness and bony tenderness present. No swelling. Normal range of motion.      Comments: Both Hands with swelling of the MTP joint for the index finger    Skin:     General: Skin is warm and dry.      Findings: No erythema or rash.   Neurological:      Mental Status: She is alert. Mental status is at baseline.   Psychiatric:         Behavior: Behavior normal.         Health Maintenance         Date Due Completion Date    RSV Vaccine (Age 60+ and Pregnant patients) (1 - 1-dose 60+ series) Never done ---    Shingles Vaccine (1 of 2) 07/20/2015 5/25/2015    Mammogram 09/14/2022 9/14/2020    Lipid Panel 12/09/2023 12/9/2022    COVID-19 Vaccine (6 - 2023-24 season) 12/29/2023 11/3/2023    Hemoglobin A1c 09/01/2024 3/1/2024    Diabetes Urine Screening 09/08/2024 9/8/2023    Eye Exam 10/25/2024 10/25/2023    DEXA Scan 11/07/2024 11/7/2022    TETANUS VACCINE 05/16/2026 5/16/2016            Health maintenance reviewed and addressed as ordered      ASSESSMENT/PLAN       1. Sarcoidosis  Increase cymbalta to aid with pain control  Narcotics not advised due to previous hx of falls resulting in fx, neurontin not tolerated due to sedation  - DULoxetine (CYMBALTA) 60 MG capsule; Take 1 capsule (60 mg total) by mouth once daily.  Dispense: 90 capsule; Refill: 1    2. Essential hypertension  Recheck  Suspect elevation due to pain    3. Seizure  Has appt with neuro    4. Joint pain in both hands  Consider OA, if present will consult ortho  Continue topical therapy  May consider PT  - X-Ray Hand 3 View Bilateral; Future    5. Foot pain, bilateral  Eval for OA  - X-Ray Foot Complete 3 view Left; Future    6. Chronic bilateral low back pain with left-sided sciatica    Currently taking cymbalta to help with pain    Micaela Mendoza MD  03/18/2024 9:34 AM        Follow up in about 3 months (around  6/18/2024).    Orders Placed This Encounter   Procedures    X-Ray Hand 3 View Bilateral    X-Ray Foot Complete 3 view Left

## 2024-03-21 DIAGNOSIS — M19.90 ARTHRITIS: ICD-10-CM

## 2024-03-21 DIAGNOSIS — M79.672 PAIN IN BOTH FEET: ICD-10-CM

## 2024-03-21 DIAGNOSIS — M79.671 PAIN IN BOTH FEET: ICD-10-CM

## 2024-03-21 DIAGNOSIS — D86.9 SARCOIDOSIS: Primary | ICD-10-CM

## 2024-03-22 ENCOUNTER — INFUSION (OUTPATIENT)
Dept: INFUSION THERAPY | Facility: HOSPITAL | Age: 79
End: 2024-03-22
Attending: INTERNAL MEDICINE
Payer: MEDICARE

## 2024-03-22 VITALS
TEMPERATURE: 98 F | HEART RATE: 84 BPM | OXYGEN SATURATION: 95 % | RESPIRATION RATE: 16 BRPM | DIASTOLIC BLOOD PRESSURE: 75 MMHG | SYSTOLIC BLOOD PRESSURE: 159 MMHG

## 2024-03-22 DIAGNOSIS — D50.9 IRON DEFICIENCY ANEMIA, UNSPECIFIED IRON DEFICIENCY ANEMIA TYPE: ICD-10-CM

## 2024-03-22 DIAGNOSIS — N18.9 ANEMIA IN CHRONIC KIDNEY DISEASE, UNSPECIFIED CKD STAGE: ICD-10-CM

## 2024-03-22 DIAGNOSIS — Z53.1 REFUSAL OF BLOOD TRANSFUSIONS AS PATIENT IS JEHOVAH'S WITNESS: ICD-10-CM

## 2024-03-22 DIAGNOSIS — N18.30 ANEMIA IN STAGE 3 CHRONIC KIDNEY DISEASE: Primary | ICD-10-CM

## 2024-03-22 DIAGNOSIS — D63.1 ANEMIA IN CHRONIC KIDNEY DISEASE, UNSPECIFIED CKD STAGE: ICD-10-CM

## 2024-03-22 DIAGNOSIS — N18.30 ANEMIA IN STAGE 3 CHRONIC KIDNEY DISEASE, UNSPECIFIED WHETHER STAGE 3A OR 3B CKD: ICD-10-CM

## 2024-03-22 DIAGNOSIS — D63.1 ANEMIA IN STAGE 3 CHRONIC KIDNEY DISEASE, UNSPECIFIED WHETHER STAGE 3A OR 3B CKD: ICD-10-CM

## 2024-03-22 DIAGNOSIS — D63.1 ANEMIA IN STAGE 3 CHRONIC KIDNEY DISEASE: Primary | ICD-10-CM

## 2024-03-22 PROCEDURE — 63600175 PHARM REV CODE 636 W HCPCS: Mod: JZ,EC,JG | Performed by: INTERNAL MEDICINE

## 2024-03-22 PROCEDURE — 96372 THER/PROPH/DIAG INJ SC/IM: CPT

## 2024-03-22 RX ADMIN — EPOETIN ALFA-EPBX 40000 UNITS: 40000 INJECTION, SOLUTION INTRAVENOUS; SUBCUTANEOUS at 12:03

## 2024-03-22 NOTE — PLAN OF CARE
Patient arrived to unit for Retacrit injection.HGB 10.6 today. Plan of care reviewed, patient agreeable to plan. Patient tolerated injection well. VSS. Discharge instructions reviewed, patient instructed to return 4/5. Patient left off unit in electric scooter. Patient in NAD at time of discharge.

## 2024-04-02 ENCOUNTER — TELEPHONE (OUTPATIENT)
Dept: NEUROLOGY | Facility: CLINIC | Age: 79
End: 2024-04-02
Payer: MEDICARE

## 2024-04-02 ENCOUNTER — OFFICE VISIT (OUTPATIENT)
Dept: PODIATRY | Facility: CLINIC | Age: 79
End: 2024-04-02
Payer: MEDICARE

## 2024-04-02 VITALS
BODY MASS INDEX: 24.78 KG/M2 | WEIGHT: 134.69 LBS | HEIGHT: 62 IN | SYSTOLIC BLOOD PRESSURE: 201 MMHG | DIASTOLIC BLOOD PRESSURE: 93 MMHG

## 2024-04-02 DIAGNOSIS — M79.671 PAIN IN BOTH FEET: ICD-10-CM

## 2024-04-02 DIAGNOSIS — M19.90 ARTHRITIS: ICD-10-CM

## 2024-04-02 DIAGNOSIS — M79.672 PAIN IN BOTH FEET: ICD-10-CM

## 2024-04-02 DIAGNOSIS — E11.49 TYPE II DIABETES MELLITUS WITH NEUROLOGICAL MANIFESTATIONS: Primary | ICD-10-CM

## 2024-04-02 DIAGNOSIS — M20.40 HAMMER TOE, UNSPECIFIED LATERALITY: ICD-10-CM

## 2024-04-02 PROCEDURE — 3288F FALL RISK ASSESSMENT DOCD: CPT | Mod: CPTII,S$GLB,, | Performed by: PODIATRIST

## 2024-04-02 PROCEDURE — 99999 PR PBB SHADOW E&M-EST. PATIENT-LVL V: CPT | Mod: PBBFAC,,, | Performed by: PODIATRIST

## 2024-04-02 PROCEDURE — 3080F DIAST BP >= 90 MM HG: CPT | Mod: CPTII,S$GLB,, | Performed by: PODIATRIST

## 2024-04-02 PROCEDURE — 1125F AMNT PAIN NOTED PAIN PRSNT: CPT | Mod: CPTII,S$GLB,, | Performed by: PODIATRIST

## 2024-04-02 PROCEDURE — 1159F MED LIST DOCD IN RCRD: CPT | Mod: CPTII,S$GLB,, | Performed by: PODIATRIST

## 2024-04-02 PROCEDURE — 3077F SYST BP >= 140 MM HG: CPT | Mod: CPTII,S$GLB,, | Performed by: PODIATRIST

## 2024-04-02 PROCEDURE — 99203 OFFICE O/P NEW LOW 30 MIN: CPT | Mod: S$GLB,,, | Performed by: PODIATRIST

## 2024-04-02 PROCEDURE — 1101F PT FALLS ASSESS-DOCD LE1/YR: CPT | Mod: CPTII,S$GLB,, | Performed by: PODIATRIST

## 2024-04-02 PROCEDURE — 1157F ADVNC CARE PLAN IN RCRD: CPT | Mod: CPTII,S$GLB,, | Performed by: PODIATRIST

## 2024-04-02 PROCEDURE — 3072F LOW RISK FOR RETINOPATHY: CPT | Mod: CPTII,S$GLB,, | Performed by: PODIATRIST

## 2024-04-02 RX ORDER — DICLOFENAC SODIUM 10 MG/G
2 GEL TOPICAL DAILY
Qty: 100 G | Refills: 3 | Status: SHIPPED | OUTPATIENT
Start: 2024-04-02

## 2024-04-02 NOTE — PROGRESS NOTES
Subjective:     Patient ID: Regino Lawrence is a 78 y.o. female.    Chief Complaint: Diabetes Mellitus (3/18/24 Dr Mendoza), Foot Pain, and Plantar Fasciitis    Regino is a 78 y.o. female who presents to the clinic upon referral from Dr. Mendoza  for evaluation and treatment of diabetic feet. 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 Presents for diabetic foot risk assessment.       PCP: Micaela Mendoza MD    Date Last Seen by PCP: per above    Current shoe gear: Tennis shoes    Hemoglobin A1C   Date Value Ref Range Status   03/01/2024 5.5 4.0 - 5.6 % Final     Comment:     ADA Screening Guidelines:  5.7-6.4%  Consistent with prediabetes  >or=6.5%  Consistent with diabetes    High levels of fetal hemoglobin interfere with the HbA1C  assay. Heterozygous hemoglobin variants (HbS, HgC, etc)do  not significantly interfere with this assay.   However, presence of multiple variants may affect accuracy.     12/18/2023 5.6 4.0 - 5.6 % Final     Comment:     ADA Screening Guidelines:  5.7-6.4%  Consistent with prediabetes  >or=6.5%  Consistent with diabetes    High levels of fetal hemoglobin  interfere with the HbA1C  assay. Heterozygous hemoglobin variants (HbS, HgC, etc)do  not significantly interfere with this assay.   However, presence of multiple variants may affect accuracy.     09/08/2023 5.1 4.0 - 5.6 % Final     Comment:     ADA Screening Guidelines:  5.7-6.4%  Consistent with prediabetes  >or=6.5%  Consistent with diabetes    High levels of fetal hemoglobin interfere with the HbA1C  assay. Heterozygous hemoglobin variants (HbS, HgC, etc)do  not significantly interfere with this assay.   However, presence of multiple variants may affect accuracy.           Review of Systems   Constitutional: Negative for chills.   Cardiovascular:  Negative for chest pain and claudication.   Respiratory:  Negative for cough.    Skin:  Positive for color change, dry skin and nail changes.   Musculoskeletal:  Positive for joint pain.   Gastrointestinal:  Negative for nausea.   Neurological:  Positive for paresthesias. Negative for numbness.   Psychiatric/Behavioral:  The patient is not nervous/anxious.         Objective:     Physical Exam  Constitutional:       Appearance: She is well-developed.      Comments: Oriented to time, place, and person.   Cardiovascular:      Comments: DP and PT pulses are palpable bilaterally. 3 sec capillary refill time and toes and feet are warm to touch proximally .  There is  hair growth on the feet and toes b/l. There is no edema b/l. No spider veins or varicosities present b/l.     Musculoskeletal:      Comments: Equinus noted b/l ankles with < 10 deg DF noted. MMT 5/5 in DF/PF/Inv/Ev resistance with no reproduction of pain in any direction. Passive range of motion of ankle and pedal joints is painless b/l.    Decreased stride, station of gait.  apropulsive toe off.  Increased angle and base of gait.      Patient has hammertoes of digits 2-5 bilateral partially reducible without symptom today.     Visible and palpable bunion without pain at dorsomedial 1st metatarsal head right and  left.  Hallux abducted right and left partially reducible, tracks laterally without being track bound.  No ecchymosis, erythema, edema, or cardinal signs infection or signs of trauma same foot.         Feet:      Right foot:      Skin integrity: No callus or dry skin.      Left foot:      Skin integrity: No callus or dry skin.   Lymphadenopathy:      Comments: Negative lymphadenopathy bilateral popliteal fossa and tarsal tunnel.   Skin:     Comments: No open lesions, lacerations or wounds noted.Interdigital spaces clean, dry and intact b/l. No erythema noted to b/l foot.  Nails normal color and trophic qualities.     Neurological:      Mental Status: She is alert.      Comments: Light touch, proprioception, and sharp/dull sensation are all intact bilaterally. Protective threshold with the Gladstone-Wienstein monofilament is intact bilaterally.    Psychiatric:         Behavior: Behavior is cooperative.           Assessment:      Encounter Diagnoses   Name Primary?    Arthritis     Pain in both feet     Type II diabetes mellitus with neurological manifestations Yes    Hammer toe, unspecified laterality      Plan:     Regino was seen today for diabetes mellitus, foot pain and plantar fasciitis.    Diagnoses and all orders for this visit:    Type II diabetes mellitus with neurological manifestations  -     DIABETIC SHOES FOR HOME USE    Arthritis  -     Ambulatory referral/consult to Podiatry    Pain in both feet  -     Ambulatory referral/consult to Podiatry  -     DIABETIC SHOES FOR HOME USE    Hammer toe, unspecified laterality  -     DIABETIC SHOES FOR HOME USE    Other orders  -     diclofenac sodium (VOLTAREN) 1 % Gel; Apply 2 g topically once daily.      I counseled the patient on her conditions, their implications and medical management.    - Shoe inspection. Diabetic Foot Education. Patient reminded of the importance of good nutrition and blood sugar control to help prevent podiatric complications of diabetes.  Patient instructed on proper foot hygeine. We discussed wearing proper shoe gear, daily foot inspections, never walking without protective shoe gear, caution putting sharp instruments to feet     - Discussed DM foot care:  Wear comfortable, proper fitting shoes. Wash feet daily. Dry well. After drying, apply moisturizer to feet (no lotion to webspaces). Inspect feet daily for skin breaks, blisters, swelling, or redness. Wear cotton socks (preferably white)  Change socks every day. Do NOT walk barefoot. Do NOT use heating pads or warm/hot water soaks     Rx Voltaren gel to be applied to affected area up to 3-4 x daily as needed for pain    Rx diabetic shoes with custom molded inserts to be worn at all times while ambulating. Prescription provided with list of local retailers.       F/u one year DM foot exam sooner PRN

## 2024-04-05 ENCOUNTER — INFUSION (OUTPATIENT)
Dept: INFUSION THERAPY | Facility: HOSPITAL | Age: 79
End: 2024-04-05
Attending: INTERNAL MEDICINE
Payer: MEDICARE

## 2024-04-05 VITALS
OXYGEN SATURATION: 96 % | SYSTOLIC BLOOD PRESSURE: 166 MMHG | RESPIRATION RATE: 16 BRPM | TEMPERATURE: 98 F | DIASTOLIC BLOOD PRESSURE: 75 MMHG | HEART RATE: 88 BPM

## 2024-04-08 ENCOUNTER — PATIENT OUTREACH (OUTPATIENT)
Dept: EMERGENCY MEDICINE | Facility: HOSPITAL | Age: 79
End: 2024-04-08
Payer: MEDICARE

## 2024-04-08 NOTE — PROGRESS NOTES
Call placed to remind Pt of her upcoming appt  with, [facility], Neurology at 9:30 on 4-8-24.Pt verbalized understanding.

## 2024-04-09 ENCOUNTER — OFFICE VISIT (OUTPATIENT)
Dept: NEUROLOGY | Facility: CLINIC | Age: 79
End: 2024-04-09
Payer: MEDICARE

## 2024-04-09 VITALS
SYSTOLIC BLOOD PRESSURE: 150 MMHG | HEART RATE: 101 BPM | DIASTOLIC BLOOD PRESSURE: 73 MMHG | HEIGHT: 62 IN | BODY MASS INDEX: 24.32 KG/M2

## 2024-04-09 DIAGNOSIS — R56.9 CONVULSIONS, UNSPECIFIED CONVULSION TYPE: Primary | ICD-10-CM

## 2024-04-09 DIAGNOSIS — R56.9 SEIZURE: ICD-10-CM

## 2024-04-09 PROCEDURE — 99215 OFFICE O/P EST HI 40 MIN: CPT | Mod: S$GLB,,, | Performed by: INTERNAL MEDICINE

## 2024-04-09 PROCEDURE — 1159F MED LIST DOCD IN RCRD: CPT | Mod: CPTII,S$GLB,, | Performed by: INTERNAL MEDICINE

## 2024-04-09 PROCEDURE — 99999 PR PBB SHADOW E&M-EST. PATIENT-LVL IV: CPT | Mod: PBBFAC,,, | Performed by: INTERNAL MEDICINE

## 2024-04-09 PROCEDURE — 3288F FALL RISK ASSESSMENT DOCD: CPT | Mod: CPTII,S$GLB,, | Performed by: INTERNAL MEDICINE

## 2024-04-09 PROCEDURE — 1157F ADVNC CARE PLAN IN RCRD: CPT | Mod: CPTII,S$GLB,, | Performed by: INTERNAL MEDICINE

## 2024-04-09 PROCEDURE — 3072F LOW RISK FOR RETINOPATHY: CPT | Mod: CPTII,S$GLB,, | Performed by: INTERNAL MEDICINE

## 2024-04-09 PROCEDURE — 1100F PTFALLS ASSESS-DOCD GE2>/YR: CPT | Mod: CPTII,S$GLB,, | Performed by: INTERNAL MEDICINE

## 2024-04-09 PROCEDURE — 3077F SYST BP >= 140 MM HG: CPT | Mod: CPTII,S$GLB,, | Performed by: INTERNAL MEDICINE

## 2024-04-09 PROCEDURE — 3078F DIAST BP <80 MM HG: CPT | Mod: CPTII,S$GLB,, | Performed by: INTERNAL MEDICINE

## 2024-04-09 RX ORDER — LEVETIRACETAM 1000 MG/1
1000 TABLET ORAL 2 TIMES DAILY
Qty: 60 TABLET | Refills: 5 | Status: SHIPPED | OUTPATIENT
Start: 2024-04-09 | End: 2025-04-09

## 2024-04-09 RX ORDER — PREGABALIN 75 MG/1
75 CAPSULE ORAL 2 TIMES DAILY
Qty: 60 CAPSULE | Refills: 6 | Status: SHIPPED | OUTPATIENT
Start: 2024-04-09 | End: 2024-04-11 | Stop reason: SDUPTHER

## 2024-04-09 NOTE — PROGRESS NOTES
Pt denies having any concerns at this time. She is aware of her Neurology appt tomorrow and plans to keep it. ED Navigator will continue to f/u periodically and assist as needed. Pt encouraged to reach out with any concerns at they arise.

## 2024-04-09 NOTE — PROGRESS NOTES
GENERAL NEUROLOGY VISIT   04/09/2024  History:    Patient is a 78 y.o. female with past medical history of epilepsy, CKD, anemia, T2DM, PE (on eliquis) presenting to the clinic for evaluation and establishment of care for seizures.  History obtained from patient and daughter present today during the visit.  Patient previously seen by Dr. Murcia last seen him in 2021.    Daughter states that patient started having seizures in 2021. No report of inciting event, etiology unidentified.  She does not get any aura, eyes rolled backwards and has whole-body shaking.  Has had occasional tongue bites, denies any bladder incontinence.  Usually shaking lasts for 1-2 minutes, makes grunting noise.  Postictally is unresponsive for a few minutes, denies agitation with complete return to baseline.  No report of clustering, has not had status epilepticus or intubated for seizures.  Has been on Keppra 500 mg b.i.d., tolerating it well, reports compliance with medication.  Patient does have memory issues as described below, but however uses a pillbox.  Reports tolerating Keppra well.  Patient has had several ER visits with breakthrough seizures, compliance in question.  Most recent ER visit on 03/11/2024 secondary to missed dose with a Keppra level of 16.8 during the visit.  No family history of seizures, no history of febrile seizures, no history of intracranial abnormality/significant trauma.    Patient also endorsing significant lower back pain , going down both hips.  Has significant burning and numbness in both the lower extremities.  Denies falls, has had near falls.  Symptoms are worse in the left leg.  Patient on duloxetine at 60, not working.  Uses cane for ambulation at home.    Past Medical History:   Diagnosis Date    Acid reflux     Allergy     Alopecia     Anemia     Anemia in CKD (chronic kidney disease) 09/22/2016    Anxiety     Arthritis     Back pain     Cataract     Chronic kidney disease     Controlled type 2  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, without long-term current use of insulin     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, without long-term current use of insulin     Depression     Diabetes mellitus, type 2     Eye injury as a child     k-abrasion  od    Hyperlipidemia     Hypertension     Hypothyroidism     Immune deficiency disorder     Immune disorder     LOC (loss of consciousness) 2021    at home    Myalgia and myositis 2012    Osteoporosis     Polyneuropathy     Pulmonary embolism 07/10/2021    Renal manifestation of secondary diabetes mellitus     Sarcoidosis     Seizure     Type 2 diabetes mellitus     Ulcer     no cancer    Urinary incontinence        Past Surgical History:   Procedure Laterality Date    CARPAL TUNNEL RELEASE      Rt wrist    CATARACT EXTRACTION W/  INTRAOCULAR LENS IMPLANT Right 2015    Dr. Azevedo    CATARACT EXTRACTION W/  INTRAOCULAR LENS IMPLANT Left 2015    Dr. Azevedo    CERVICAL SPINE SURGERY       SECTION      CHOLECYSTECTOMY      INJECTION OF ANESTHETIC AGENT AROUND NERVE Left 2020    Procedure: BLOCK, NERVE LEFT FEMORAL AND OBTURATOR;  Surgeon: Alfonso Richards MD;  Location: [facility];  Service: Pain Management;  Laterality: Left;  NEEDS CONSENT    INJECTION OF ANESTHETIC AGENT AROUND NERVE Bilateral 10/09/2023    Procedure: BLOCK, NERVE, BILATERAL L3-L4-L5 MEDIAL BRANCH;  Surgeon: Alfonso Richards MD;  Location: [facility];  Service: Pain Management;  Laterality: Bilateral;    INJECTION OF JOINT Left 2019    Procedure: Injection, Joint  fLUOROSCOPIC jOINT iNJECTION (hIP iNJECTION) LEFT ROCH BURSA AS WELL LEFT TROCHANTERIC BURSA;  Surgeon: Alfonso Richards MD;  Location: [facility];  Service: Pain Management;  Laterality: Left;  NEEDS CONSENT, DIABETIC    INJECTION OF JOINT Left 2019    Procedure: Injection, Joint FLUOROSCOPIC JOINT  INJECTION (HIP INJECTION) LEFT HIP;  Surgeon: Alfonso Richards MD;  Location: [facility];  Service: Pain Management;  Laterality: Left;  NEEDS CONSENT    INJECTION OF JOINT Left 09/12/2019    Procedure: INJECTION, JOINT;  Surgeon: Alfonso Richards MD;  Location: [facility];  Service: Pain Management;  Laterality: Left;  Left Hip and Left GTB Injections    INJECTION OF JOINT Left 07/27/2020    Procedure: INJECTION, JOINT, LEFT HIP and LEFT GREATER TROCHANTERIC BURSA;  Surgeon: Alfonso Richards MD;  Location: [facility];  Service: Pain Management;  Laterality: Left;  INJECTION, JOINT, LEFT HIP and LEFT GREATER TROCHANTERIC BURSA    INJECTION OF JOINT Left 09/03/2020    Procedure: INJECTION, JOINT, LEFT SI;  Surgeon: Alfonso Richards MD;  Location: [facility];  Service: Pain Management;  Laterality: Left;  INJECTION, JOINT, LEFT SI    TRANSFORAMINAL EPIDURAL INJECTION OF STEROID Bilateral 12/05/2019    Procedure: INJECTION, STEROID, EPIDURAL, TRANSFORAMINAL APPROACH;  Surgeon: Alfonso Richards MD;  Location: [facility];  Service: Pain Management;  Laterality: Bilateral;  B/L TF RHIANNA L5  Consent Needed    TRANSFORAMINAL EPIDURAL INJECTION OF STEROID Bilateral 06/29/2020    Procedure: INJECTION, STEROID, EPIDURAL, TRANSFORAMINAL APPROACH L5/S1;  Surgeon: Alfonso Richards MD;  Location: [facility];  Service: Pain Management;  Laterality: Bilateral;  B/L TF RHIANNA L5/S1    TUBAL LIGATION         Social History     Socioeconomic History    Marital status:      Spouse name: Gasper     Number of children: 5    Years of education: Business school after high school    Highest education level: 12th grade   Tobacco Use    Smoking status: Never     Passive exposure: Never    Smokeless tobacco: Never   Substance and Sexual Activity    Alcohol use: No    Drug use: No    Sexual activity: Not Currently     Partners: Male     Social Determinants of Health     Financial Resource Strain: Low Risk  (3/12/2024)    Overall Financial Resource  Strain (CARDIA)     Difficulty of Paying Living Expenses: Not very hard   Food Insecurity: Patient Declined (12/17/2023)    Hunger Vital Sign     Worried About Running Out of Food in the Last Year: Patient declined     Ran Out of Food in the Last Year: Patient declined   Transportation Needs: No Transportation Needs (3/12/2024)    PRAPARE - Transportation     Lack of Transportation (Medical): No     Lack of Transportation (Non-Medical): No   Physical Activity: Inactive (12/17/2023)    Exercise Vital Sign     Days of Exercise per Week: 0 days     Minutes of Exercise per Session: 0 min   Stress: Patient Declined (12/17/2023)    [employer] [place] - Occupational Stress Questionnaire     Feeling of Stress : Patient declined   Social Connections: Unknown (12/17/2023)    Social Connection and Isolation Panel [NHANES]     Frequency of Communication with Friends and Family: Patient declined     Frequency of Social Gatherings with Friends and Family: Patient declined     Attends Club or Organization Meetings: Patient declined     Marital Status:    Housing Stability: Patient Declined (12/17/2023)    Housing Stability Vital Sign     Unable to Pay for Housing in the Last Year: Patient declined     Unstable Housing in the Last Year: Patient declined       Review of patient's allergies indicates:   Allergen Reactions    Azathioprine Shortness Of Breath and Other (See Comments)     Fatigue       Current Outpatient Medications on File Prior to Visit   Medication Sig Dispense Refill    ACCU-CHEK FASTCLIX LANCING DEV Kit USE AS DIRECTED. 1 each 0    acetaminophen-codeine 300-30mg (TYLENOL #3) 300-30 mg Tab Take 1 tablet by mouth every 6 (six) hours as needed.      ALCOHOL ANTISEPTIC PADS (ALCOHOL PREP PADS TOP)       apixaban (ELIQUIS) 5 mg Tab Take 1 tablet (5 mg total) by mouth 2 (two) times daily. Start this prescription after finishing starter pack 60 tablet 5    atorvastatin (LIPITOR) 20 MG tablet  "Take 1 tablet (20 mg total) by mouth once daily. 90 tablet 1    blood sugar diagnostic (ACCU-CHEK SMARTVIEW TEST STRIP) Strp Use as directed to check blood sugar twice daily. 200 strip 3    blood sugar diagnostic Strp To check BG three times daily, to use with insurance preferred meter 300 each 3    blood-glucose meter kit Use as instructed 1 each 0    carvediloL (COREG) 25 MG tablet Take 1 tablet (25 mg total) by mouth 2 (two) times daily. 180 tablet 3    diclofenac sodium (VOLTAREN) 1 % Gel Apply 2 g topically once daily. 100 g 5    diclofenac sodium (VOLTAREN) 1 % Gel Apply 2 g topically once daily. 100 g 3    DULoxetine (CYMBALTA) 60 MG capsule Take 1 capsule (60 mg total) by mouth once daily. 90 capsule 1    fenofibrate 160 MG Tab Take 1 tablet (160 mg total) by mouth once daily. 90 tablet 1    folic acid (FOLVITE) 1 MG tablet Take 1 tablet (1,000 mcg total) by mouth once daily. 90 tablet 1    hydrALAZINE (APRESOLINE) 25 MG tablet Take 2 tabs every 8 hours by mouth. Check BP 2 hours after each dose and if Blood pressure >160- please take 1 extra tab 120 tablet 1    insulin (LANTUS SOLOSTAR U-100 INSULIN) glargine 100 units/mL SubQ pen Inject 10 Units into the skin every evening. 18 mL 1    KLGA ketoprofen 10% LIDOcaine 10% gabapentin 6% amitriptyline 2% in transdermal cream Apply 1 to 2 grams 3 to 4 times daily 90 g 11    levETIRAcetam (KEPPRA) 500 MG Tab Take 1 tablet (500 mg total) by mouth 2 (two) times daily. 60 tablet 5    levothyroxine (SYNTHROID) 50 MCG tablet Take 1 tablet (50 mcg total) by mouth before breakfast. 90 tablet 3    LINZESS 72 mcg Cap capsule Take 72 mcg by mouth every morning.      NIFEdipine (PROCARDIA-XL) 90 MG (OSM) 24 hr tablet Take 1 tablet (90 mg total) by mouth once daily. 90 tablet 1    pantoprazole (PROTONIX) 40 MG tablet Take 1 tablet (40 mg total) by mouth once daily. 90 tablet 1    pen needle, diabetic (NOVOFINE 32) 32 gauge x 1/4" Ndle For use with Usersnapmir pen . 100 each 3    " predniSONE (DELTASONE) 1 MG tablet TAKE 4 TABLETS BY MOUTH ONCE DAILY FOR  ONE  MONTH  THEN  DECREASE  BY  1  TABLET  EVERY  MONTH  IF  LABS  ALLOW (Patient taking differently: TAKE 4 TABLETS BY MOUTH ONCE DAILY FOR  ONE  MONTH  THEN  DECREASE  BY  1  TABLET  EVERY  MONTH  IF  LABS  ALLOW) 120 tablet 0    tiZANidine (ZANAFLEX) 2 MG tablet Take 2 tablets (4 mg total) by mouth every 8 (eight) hours as needed (muscle spasm). 270 tablet 1    triazolam (HALCION) 0.25 MG Tab TAKE TWO CAPSULES BY MOUTH ONE HOUR BEFORE APPOINTMENT WITH SMALL AMOUNT OF WATER AND BRING REMAINING 2 TABLETS TO APPOINTMENT      albuterol-ipratropium (DUO-NEB) 2.5 mg-0.5 mg/3 mL nebulizer solution Take 3 mLs by nebulization every 4 (four) hours as needed for Wheezing or Shortness of Breath. Rescue 90 each 11    calcium carbonate (OS-MALCOLM) 600 mg calcium (1,500 mg) Tab Take 1 tablet by mouth 2 (two) times daily.       cetirizine (ZYRTEC) 10 MG tablet Take 1 tablet (10 mg total) by mouth once daily. 30 tablet 0    fluticasone propion-salmeterol 115-21 mcg/dose (ADVAIR HFA) 115-21 mcg/actuation HFAA inhaler Inhale 2 puffs into the lungs every 12 (twelve) hours. Controller 12 g 3    olopatadine (PATANOL) 0.1 % ophthalmic solution Place 1 drop into both eyes daily as needed for Allergies. 5 mL 1    [DISCONTINUED] gabapentin (NEURONTIN) 400 MG capsule Take 1 capsule (400 mg total) by mouth 3 (three) times daily 90 capsule 1     No current facility-administered medications on file prior to visit.        Family history:  Not contributary    Review Of Systems     Constitutional Negative for fevers, chills, weigh loss   HEENT Negative for hearing loss, dysphagia, sore throat, diplopia   Respiratory Negative for shortness of breath, cough    Cardiovascular Negative for chest pain, palpitations    Gastrointestinal Negative for constipation, diarrhea, early satiety    Skin Negative for rashes    Musculoskeletal Negative for joint pains, myalgias.  "  Neurological See Above    Psychological Negative for sleep disturbances.    Heme/Lymph Negative for easy bruising, easy bleeding    Endocrine Negative for polyuria, polydypsia     Physical Exam:     Physical Examination  BP (!) 150/73 (BP Location: Left arm, Patient Position: Sitting, BP Method: Large (Automatic))   Pulse 101   Ht 5' 2.4" (1.585 m)   LMP  (LMP Unknown)   BMI 24.32 kg/m²   Body mass index is 24.32 kg/m².      Neurological Exam  Mental Status:   Alert and oriented to name, date, location  Unable to do serial 7s  2/3 three word recall  Unable to spell world backwards    CN:   II, III, IV, VI: PERRL, EOMI, no nystagmus, fundus not visualized due to inadequate dilation.V: intact to fine touch, temperature, pain.VII: symmetrical facial movement, nice smile, no drooping.VIII: grossly intact to hearing.IX, X: symmetrical palate, normal palatal elevation.XI: 5/5 SCM and 5/5 trapezius.XII: tongue midline, 5/5, no deviation or fasciculation.           Motor:    ShAb ElbEx ElbFle WrE WrFle Interos  HipFl KnExt KnFlex FtDors FtPlant   Left 5 5 5 5 5 5 5 5 5 5 5 5   Right 5 5 5 5 5 5 5 5 5 5 5 5   Tone: Normal tone in UE and LE, no atrophy, no fasciculation, no tremor no pronator drift.                Reflexes:    Bicep Tricep Brachioradial Patellar Achilles   Left 2+ 2+ 2+ 2+ 1+   Right 2+ 2+ 2+ 2+ 1+   No Clonus, down going toes b/l.    Sensation: on both UEs and LEs    Reduced to light touch and temperature in the left hand, reduced vibration in all 4 extremities    Coordination:    Tremor: Absent.    Gait:    In scooter, not tested    Interval/Previous Work-up:   EEG 07/09/2021: Abnormal EEG due to a mild generalized cerebral dysfunction with regional subcortical dysfunction in the left temporal region and independent seizure foci in the left and right anterior temporal regions with the left being more active. There are no electrographic seizures.     MRI brain without contrast 07/09/2021: No " evidence of acute intracranial pathology. No evidence for press or mass. Chronic microvascular ischemic type change.     MRI L-spine Aug:  Moderate to severe foraminal narrowing at L3-L4, L5-S1, mild at other levels    Impression:   #hx of seizures  Unclear etiology, still having breakthrough seizures.  We will increase Keppra to 1 g b.i.d. with reinforcement regarding compliance.  Patient does not drive.     #lumbar radiculopathy  Known multilevel pathology.  Examination also concerning for overriding toxic/metabolic polyneuropathy.  Has been on gabapentin in the past, caused syncopal episodes.  Currently on Cymbalta, not helpful.  We will add Lyrica.    Plan:   #history of epilepsy  - increase Keppra to 1 g b.i.d.  - MRI brain with and without contrast, epilepsy protocol  General epilepsy education and precautions were reviewed.  I suggested to exercise judgement of a risk during any activity, and recommended avoidance of or close supervision in any situation where sudden loss of awareness or a fall may be harmful to the patient or others.  This includes, but is not limited to, activities like bathing alone, swimming, climbing, driving, etc.  I also discussed first-aid management of seizures and indications for a 911 call or an emergency room visit.  I emphasized the importance of compliance with medications. A possible interaction of the anti-epileptics medication(s) with other medications should also be discussed with the prescribing physician whenever a new medication is started.  We discussed the importance of periodic follow-up visits. I have instructed the family to call our clinic if they have any questions or if any new problems arise.      #lumbar radiculopathy  - starting on Lyrica 75 mg b.i.d.  - continue Cymbalta 60 mg daily      RTC 6 months or sooner if needed    Time spent on this encounter: 60 minutes. This includes face to face time and non-face to face time preparing to see the patient (eg,  review of tests), obtaining and/or reviewing separately obtained history, documenting clinical information in the electronic or other health record, independently interpreting results and communicating results to the patient/family/caregiver, or care coordinator.     Pauline Knapp MD  Neurology

## 2024-04-11 ENCOUNTER — PATIENT MESSAGE (OUTPATIENT)
Dept: NEUROLOGY | Facility: CLINIC | Age: 79
End: 2024-04-11
Payer: MEDICARE

## 2024-04-11 RX ORDER — PREGABALIN 75 MG/1
75 CAPSULE ORAL 2 TIMES DAILY
Qty: 60 CAPSULE | Refills: 6 | Status: SHIPPED | OUTPATIENT
Start: 2024-04-11 | End: 2024-05-09

## 2024-04-17 ENCOUNTER — PATIENT MESSAGE (OUTPATIENT)
Dept: NEUROLOGY | Facility: CLINIC | Age: 79
End: 2024-04-17
Payer: MEDICARE

## 2024-04-17 ENCOUNTER — PATIENT MESSAGE (OUTPATIENT)
Dept: FAMILY MEDICINE | Facility: CLINIC | Age: 79
End: 2024-04-17
Payer: MEDICARE

## 2024-04-17 DIAGNOSIS — R10.9 FLANK PAIN: Primary | ICD-10-CM

## 2024-04-18 NOTE — TELEPHONE ENCOUNTER
Please set up appointment for her to give urine sample    If her pains worsen she may need to be seen in person    Micaela Mendoza MD

## 2024-04-19 ENCOUNTER — INFUSION (OUTPATIENT)
Dept: INFUSION THERAPY | Facility: HOSPITAL | Age: 79
End: 2024-04-19
Attending: INTERNAL MEDICINE
Payer: MEDICARE

## 2024-04-19 VITALS
TEMPERATURE: 98 F | HEART RATE: 97 BPM | RESPIRATION RATE: 18 BRPM | OXYGEN SATURATION: 98 % | SYSTOLIC BLOOD PRESSURE: 173 MMHG | DIASTOLIC BLOOD PRESSURE: 92 MMHG

## 2024-04-19 DIAGNOSIS — N18.30 ANEMIA IN STAGE 3 CHRONIC KIDNEY DISEASE, UNSPECIFIED WHETHER STAGE 3A OR 3B CKD: ICD-10-CM

## 2024-04-19 DIAGNOSIS — D63.1 ANEMIA IN STAGE 3 CHRONIC KIDNEY DISEASE, UNSPECIFIED WHETHER STAGE 3A OR 3B CKD: ICD-10-CM

## 2024-04-19 DIAGNOSIS — Z53.1 REFUSAL OF BLOOD TRANSFUSIONS AS PATIENT IS JEHOVAH'S WITNESS: ICD-10-CM

## 2024-04-19 DIAGNOSIS — D50.9 IRON DEFICIENCY ANEMIA, UNSPECIFIED IRON DEFICIENCY ANEMIA TYPE: ICD-10-CM

## 2024-04-19 DIAGNOSIS — D63.1 ANEMIA IN STAGE 3 CHRONIC KIDNEY DISEASE: Primary | ICD-10-CM

## 2024-04-19 DIAGNOSIS — N18.9 ANEMIA IN CHRONIC KIDNEY DISEASE, UNSPECIFIED CKD STAGE: ICD-10-CM

## 2024-04-19 DIAGNOSIS — D63.1 ANEMIA IN CHRONIC KIDNEY DISEASE, UNSPECIFIED CKD STAGE: ICD-10-CM

## 2024-04-19 DIAGNOSIS — N18.30 ANEMIA IN STAGE 3 CHRONIC KIDNEY DISEASE: Primary | ICD-10-CM

## 2024-04-19 PROCEDURE — 96372 THER/PROPH/DIAG INJ SC/IM: CPT

## 2024-04-19 PROCEDURE — 63600175 PHARM REV CODE 636 W HCPCS: Mod: JZ,EC,JG | Performed by: INTERNAL MEDICINE

## 2024-04-19 RX ADMIN — EPOETIN ALFA-EPBX 40000 UNITS: 40000 INJECTION, SOLUTION INTRAVENOUS; SUBCUTANEOUS at 11:04

## 2024-04-19 NOTE — NURSING
Pt arrived to appt. With daughter. Pt hgb 10.4. Pt VSS, AAOX4, ,speech clear, use of electric wheelchair. Pt tolerated tx. Well. Educated on fall risk and given next appt date and time prior to discharge.

## 2024-04-21 ENCOUNTER — PATIENT MESSAGE (OUTPATIENT)
Dept: FAMILY MEDICINE | Facility: CLINIC | Age: 79
End: 2024-04-21
Payer: MEDICARE

## 2024-04-21 DIAGNOSIS — M79.10 MYALGIA: ICD-10-CM

## 2024-04-21 DIAGNOSIS — G72.9 MYOPATHY: ICD-10-CM

## 2024-04-21 DIAGNOSIS — N18.30 DIABETES MELLITUS WITH STAGE 3 CHRONIC KIDNEY DISEASE: ICD-10-CM

## 2024-04-21 DIAGNOSIS — E11.22 DIABETES MELLITUS WITH STAGE 3 CHRONIC KIDNEY DISEASE: ICD-10-CM

## 2024-04-22 NOTE — TELEPHONE ENCOUNTER
No care due was identified.  [facility] Embedded Care Due Messages. Reference number: [id number].   4/22/2024 7:14:32 AM CDT

## 2024-04-23 RX ORDER — TIZANIDINE 2 MG/1
4 TABLET ORAL EVERY 8 HOURS PRN
Qty: 270 TABLET | Refills: 1 | Status: SHIPPED | OUTPATIENT
Start: 2024-04-23

## 2024-04-23 NOTE — TELEPHONE ENCOUNTER
Please advise regarding her UA from 4/19/24 - I do notice the urine culture, however, was not linked to the lab that was scheduled.

## 2024-04-24 ENCOUNTER — LAB VISIT (OUTPATIENT)
Dept: LAB | Facility: HOSPITAL | Age: 79
End: 2024-04-24
Attending: FAMILY MEDICINE
Payer: MEDICARE

## 2024-04-24 DIAGNOSIS — R10.9 FLANK PAIN: ICD-10-CM

## 2024-04-24 PROCEDURE — 87086 URINE CULTURE/COLONY COUNT: CPT | Performed by: FAMILY MEDICINE

## 2024-04-26 LAB — BACTERIA UR CULT: NO GROWTH

## 2024-05-01 DIAGNOSIS — D86.9 SARCOIDOSIS: ICD-10-CM

## 2024-05-01 DIAGNOSIS — G72.9 MYOPATHY: ICD-10-CM

## 2024-05-02 RX ORDER — PREDNISONE 1 MG/1
TABLET ORAL
Qty: 120 TABLET | Refills: 0 | Status: SHIPPED | OUTPATIENT
Start: 2024-05-02 | End: 2024-06-06 | Stop reason: SDUPTHER

## 2024-05-03 ENCOUNTER — INFUSION (OUTPATIENT)
Dept: INFUSION THERAPY | Facility: HOSPITAL | Age: 79
End: 2024-05-03
Attending: INTERNAL MEDICINE
Payer: MEDICARE

## 2024-05-03 VITALS
RESPIRATION RATE: 16 BRPM | HEART RATE: 95 BPM | OXYGEN SATURATION: 96 % | SYSTOLIC BLOOD PRESSURE: 173 MMHG | DIASTOLIC BLOOD PRESSURE: 81 MMHG

## 2024-05-03 DIAGNOSIS — N18.30 ANEMIA IN STAGE 3 CHRONIC KIDNEY DISEASE: Primary | ICD-10-CM

## 2024-05-03 DIAGNOSIS — D63.1 ANEMIA IN STAGE 3 CHRONIC KIDNEY DISEASE, UNSPECIFIED WHETHER STAGE 3A OR 3B CKD: ICD-10-CM

## 2024-05-03 DIAGNOSIS — N18.30 ANEMIA IN STAGE 3 CHRONIC KIDNEY DISEASE, UNSPECIFIED WHETHER STAGE 3A OR 3B CKD: ICD-10-CM

## 2024-05-03 DIAGNOSIS — N18.9 ANEMIA IN CHRONIC KIDNEY DISEASE, UNSPECIFIED CKD STAGE: ICD-10-CM

## 2024-05-03 DIAGNOSIS — D50.9 IRON DEFICIENCY ANEMIA, UNSPECIFIED IRON DEFICIENCY ANEMIA TYPE: ICD-10-CM

## 2024-05-03 DIAGNOSIS — D63.1 ANEMIA IN STAGE 3 CHRONIC KIDNEY DISEASE: Primary | ICD-10-CM

## 2024-05-03 DIAGNOSIS — D63.1 ANEMIA IN CHRONIC KIDNEY DISEASE, UNSPECIFIED CKD STAGE: ICD-10-CM

## 2024-05-03 DIAGNOSIS — Z53.1 REFUSAL OF BLOOD TRANSFUSIONS AS PATIENT IS JEHOVAH'S WITNESS: ICD-10-CM

## 2024-05-03 PROCEDURE — 63600175 PHARM REV CODE 636 W HCPCS: Mod: JZ,EC,JG,HCNC | Performed by: INTERNAL MEDICINE

## 2024-05-03 PROCEDURE — 96372 THER/PROPH/DIAG INJ SC/IM: CPT | Mod: HCNC

## 2024-05-03 RX ADMIN — EPOETIN ALFA-EPBX 40000 UNITS: 40000 INJECTION, SOLUTION INTRAVENOUS; SUBCUTANEOUS at 11:05

## 2024-05-03 NOTE — PLAN OF CARE
Patient presented to unit for q2w Retacrit 40k injection. VSS. Complains of fatigue, no other new or worsening complaints voiced. Labs reviewed. Injection tolerated without difficulty. Patient discharged in NAD.    Problem: Chronic Kidney Disease  Goal: Optimal Coping with Chronic Illness  Outcome: Progressing  Goal: Electrolyte Balance  Outcome: Progressing  Goal: Fluid Balance  Outcome: Progressing  Goal: Optimal Functional Ability  Outcome: Progressing  Goal: Absence of Anemia Signs and Symptoms  Outcome: Progressing  Goal: Optimal Oral Intake  Outcome: Progressing  Goal: Acceptable Pain Control  Outcome: Progressing  Goal: Minimize Renal Failure Effects  Outcome: Progressing

## 2024-05-07 ENCOUNTER — PATIENT MESSAGE (OUTPATIENT)
Dept: NEUROLOGY | Facility: CLINIC | Age: 79
End: 2024-05-07
Payer: MEDICARE

## 2024-05-08 ENCOUNTER — TELEPHONE (OUTPATIENT)
Dept: NEUROLOGY | Facility: CLINIC | Age: 79
End: 2024-05-08
Payer: MEDICARE

## 2024-05-09 RX ORDER — PREGABALIN 75 MG/1
75 CAPSULE ORAL 2 TIMES DAILY
Qty: 60 CAPSULE | Refills: 5 | Status: SHIPPED | OUTPATIENT
Start: 2024-05-09 | End: 2024-06-06 | Stop reason: SDUPTHER

## 2024-05-15 DIAGNOSIS — I26.99 PULMONARY EMBOLISM, UNSPECIFIED CHRONICITY, UNSPECIFIED PULMONARY EMBOLISM TYPE, UNSPECIFIED WHETHER ACUTE COR PULMONALE PRESENT: ICD-10-CM

## 2024-05-15 DIAGNOSIS — E11.69 COMBINED HYPERLIPIDEMIA ASSOCIATED WITH TYPE 2 DIABETES MELLITUS: Chronic | ICD-10-CM

## 2024-05-15 DIAGNOSIS — E78.2 COMBINED HYPERLIPIDEMIA ASSOCIATED WITH TYPE 2 DIABETES MELLITUS: Chronic | ICD-10-CM

## 2024-05-15 RX ORDER — FENOFIBRATE 160 MG/1
160 TABLET ORAL DAILY
Qty: 90 TABLET | Refills: 1 | Status: SHIPPED | OUTPATIENT
Start: 2024-05-15

## 2024-05-15 NOTE — TELEPHONE ENCOUNTER
No care due was identified.  [facility] Embedded Care Due Messages. Reference number: [id number].   5/15/2024 10:27:05 AM CDT

## 2024-05-17 ENCOUNTER — INFUSION (OUTPATIENT)
Dept: INFUSION THERAPY | Facility: HOSPITAL | Age: 79
End: 2024-05-17
Attending: INTERNAL MEDICINE
Payer: MEDICARE

## 2024-05-17 VITALS
DIASTOLIC BLOOD PRESSURE: 71 MMHG | TEMPERATURE: 98 F | RESPIRATION RATE: 16 BRPM | SYSTOLIC BLOOD PRESSURE: 141 MMHG | OXYGEN SATURATION: 97 % | HEART RATE: 91 BPM

## 2024-05-17 DIAGNOSIS — D63.1 ANEMIA IN STAGE 3 CHRONIC KIDNEY DISEASE: Primary | ICD-10-CM

## 2024-05-17 DIAGNOSIS — N18.30 ANEMIA IN STAGE 3 CHRONIC KIDNEY DISEASE, UNSPECIFIED WHETHER STAGE 3A OR 3B CKD: ICD-10-CM

## 2024-05-17 DIAGNOSIS — N18.9 ANEMIA IN CHRONIC KIDNEY DISEASE, UNSPECIFIED CKD STAGE: ICD-10-CM

## 2024-05-17 DIAGNOSIS — D63.1 ANEMIA IN CHRONIC KIDNEY DISEASE, UNSPECIFIED CKD STAGE: ICD-10-CM

## 2024-05-17 DIAGNOSIS — D63.1 ANEMIA IN STAGE 3 CHRONIC KIDNEY DISEASE, UNSPECIFIED WHETHER STAGE 3A OR 3B CKD: ICD-10-CM

## 2024-05-17 DIAGNOSIS — D50.9 IRON DEFICIENCY ANEMIA, UNSPECIFIED IRON DEFICIENCY ANEMIA TYPE: ICD-10-CM

## 2024-05-17 DIAGNOSIS — Z53.1 REFUSAL OF BLOOD TRANSFUSIONS AS PATIENT IS JEHOVAH'S WITNESS: ICD-10-CM

## 2024-05-17 DIAGNOSIS — N18.30 ANEMIA IN STAGE 3 CHRONIC KIDNEY DISEASE: Primary | ICD-10-CM

## 2024-05-17 PROCEDURE — 63600175 PHARM REV CODE 636 W HCPCS: Mod: JZ,EC,JG,HCNC | Performed by: INTERNAL MEDICINE

## 2024-05-17 PROCEDURE — 96372 THER/PROPH/DIAG INJ SC/IM: CPT | Mod: HCNC

## 2024-05-17 RX ADMIN — EPOETIN ALFA-EPBX 40000 UNITS: 40000 INJECTION, SOLUTION INTRAVENOUS; SUBCUTANEOUS at 12:05

## 2024-05-17 NOTE — PLAN OF CARE
Pt arrived to unit via wheelchair and accompanied by daughter for injection therapy Retacrit. Pt alert and oriented with no new or worsening complaints upon arrival. Retacrit injection administered without incident for hgb <11. Pt discharged home upon completion in [facility].

## 2024-05-31 ENCOUNTER — LAB VISIT (OUTPATIENT)
Dept: LAB | Facility: HOSPITAL | Age: 79
End: 2024-05-31
Attending: INTERNAL MEDICINE
Payer: MEDICARE

## 2024-05-31 DIAGNOSIS — D50.9 IRON DEFICIENCY ANEMIA, UNSPECIFIED IRON DEFICIENCY ANEMIA TYPE: ICD-10-CM

## 2024-05-31 DIAGNOSIS — D63.1 ANEMIA IN CHRONIC KIDNEY DISEASE, UNSPECIFIED CKD STAGE: ICD-10-CM

## 2024-05-31 DIAGNOSIS — N18.9 ANEMIA IN CHRONIC KIDNEY DISEASE, UNSPECIFIED CKD STAGE: ICD-10-CM

## 2024-05-31 LAB
BASOPHILS # BLD AUTO: 0.02 K/UL (ref 0–0.2)
BASOPHILS NFR BLD: 0.5 % (ref 0–1.9)
DIFFERENTIAL METHOD BLD: ABNORMAL
EOSINOPHIL # BLD AUTO: 0.1 K/UL (ref 0–0.5)
EOSINOPHIL NFR BLD: 1.9 % (ref 0–8)
ERYTHROCYTE [DISTWIDTH] IN BLOOD BY AUTOMATED COUNT: 13.1 % (ref 11.5–14.5)
FERRITIN SERPL-MCNC: 64 NG/ML (ref 20–300)
HCT VFR BLD AUTO: 36.8 % (ref 37–48.5)
HGB BLD-MCNC: 11.6 G/DL (ref 12–16)
IMM GRANULOCYTES # BLD AUTO: 0.01 K/UL (ref 0–0.04)
IMM GRANULOCYTES NFR BLD AUTO: 0.3 % (ref 0–0.5)
IRON SERPL-MCNC: 97 UG/DL (ref 30–160)
LYMPHOCYTES # BLD AUTO: 1.2 K/UL (ref 1–4.8)
LYMPHOCYTES NFR BLD: 31.9 % (ref 18–48)
MCH RBC QN AUTO: 33.4 PG (ref 27–31)
MCHC RBC AUTO-ENTMCNC: 31.5 G/DL (ref 32–36)
MCV RBC AUTO: 106 FL (ref 82–98)
MONOCYTES # BLD AUTO: 0.4 K/UL (ref 0.3–1)
MONOCYTES NFR BLD: 11.3 % (ref 4–15)
NEUTROPHILS # BLD AUTO: 2 K/UL (ref 1.8–7.7)
NEUTROPHILS NFR BLD: 54.1 % (ref 38–73)
NRBC BLD-RTO: 0 /100 WBC
PLATELET # BLD AUTO: 299 K/UL (ref 150–450)
PMV BLD AUTO: 9.2 FL (ref 9.2–12.9)
RBC # BLD AUTO: 3.47 M/UL (ref 4–5.4)
SATURATED IRON: 21 % (ref 20–50)
TOTAL IRON BINDING CAPACITY: 459 UG/DL (ref 250–450)
TRANSFERRIN SERPL-MCNC: 310 MG/DL (ref 200–375)
WBC # BLD AUTO: 3.73 K/UL (ref 3.9–12.7)

## 2024-05-31 PROCEDURE — 82728 ASSAY OF FERRITIN: CPT | Mod: HCNC | Performed by: INTERNAL MEDICINE

## 2024-05-31 PROCEDURE — 85025 COMPLETE CBC W/AUTO DIFF WBC: CPT | Mod: HCNC | Performed by: INTERNAL MEDICINE

## 2024-05-31 PROCEDURE — 83540 ASSAY OF IRON: CPT | Mod: HCNC | Performed by: INTERNAL MEDICINE

## 2024-05-31 PROCEDURE — 36415 COLL VENOUS BLD VENIPUNCTURE: CPT | Mod: HCNC | Performed by: INTERNAL MEDICINE

## 2024-06-03 ENCOUNTER — LAB VISIT (OUTPATIENT)
Dept: LAB | Facility: HOSPITAL | Age: 79
End: 2024-06-03
Payer: MEDICARE

## 2024-06-03 DIAGNOSIS — D50.9 IRON DEFICIENCY ANEMIA, UNSPECIFIED IRON DEFICIENCY ANEMIA TYPE: ICD-10-CM

## 2024-06-03 DIAGNOSIS — N18.9 ANEMIA IN CHRONIC KIDNEY DISEASE, UNSPECIFIED CKD STAGE: ICD-10-CM

## 2024-06-03 DIAGNOSIS — D63.1 ANEMIA IN CHRONIC KIDNEY DISEASE, UNSPECIFIED CKD STAGE: ICD-10-CM

## 2024-06-03 LAB
BASOPHILS # BLD AUTO: 0.03 K/UL (ref 0–0.2)
BASOPHILS NFR BLD: 0.6 % (ref 0–1.9)
DIFFERENTIAL METHOD BLD: ABNORMAL
EOSINOPHIL # BLD AUTO: 0.1 K/UL (ref 0–0.5)
EOSINOPHIL NFR BLD: 1 % (ref 0–8)
ERYTHROCYTE [DISTWIDTH] IN BLOOD BY AUTOMATED COUNT: 13 % (ref 11.5–14.5)
FERRITIN SERPL-MCNC: 74 NG/ML (ref 20–300)
HCT VFR BLD AUTO: 36.4 % (ref 37–48.5)
HGB BLD-MCNC: 11.2 G/DL (ref 12–16)
IMM GRANULOCYTES # BLD AUTO: 0.02 K/UL (ref 0–0.04)
IMM GRANULOCYTES NFR BLD AUTO: 0.4 % (ref 0–0.5)
IRON SERPL-MCNC: 75 UG/DL (ref 30–160)
LYMPHOCYTES # BLD AUTO: 1.4 K/UL (ref 1–4.8)
LYMPHOCYTES NFR BLD: 27.2 % (ref 18–48)
MCH RBC QN AUTO: 33 PG (ref 27–31)
MCHC RBC AUTO-ENTMCNC: 30.8 G/DL (ref 32–36)
MCV RBC AUTO: 107 FL (ref 82–98)
MONOCYTES # BLD AUTO: 0.5 K/UL (ref 0.3–1)
MONOCYTES NFR BLD: 8.9 % (ref 4–15)
NEUTROPHILS # BLD AUTO: 3.2 K/UL (ref 1.8–7.7)
NEUTROPHILS NFR BLD: 61.9 % (ref 38–73)
NRBC BLD-RTO: 0 /100 WBC
PLATELET # BLD AUTO: 364 K/UL (ref 150–450)
PMV BLD AUTO: 9.9 FL (ref 9.2–12.9)
RBC # BLD AUTO: 3.39 M/UL (ref 4–5.4)
SATURATED IRON: 16 % (ref 20–50)
TOTAL IRON BINDING CAPACITY: 457 UG/DL (ref 250–450)
TRANSFERRIN SERPL-MCNC: 309 MG/DL (ref 200–375)
WBC # BLD AUTO: 5.15 K/UL (ref 3.9–12.7)

## 2024-06-03 PROCEDURE — 36415 COLL VENOUS BLD VENIPUNCTURE: CPT | Mod: HCNC,PO | Performed by: INTERNAL MEDICINE

## 2024-06-03 PROCEDURE — 85025 COMPLETE CBC W/AUTO DIFF WBC: CPT | Mod: HCNC | Performed by: INTERNAL MEDICINE

## 2024-06-03 PROCEDURE — 83540 ASSAY OF IRON: CPT | Mod: HCNC | Performed by: INTERNAL MEDICINE

## 2024-06-03 PROCEDURE — 82728 ASSAY OF FERRITIN: CPT | Mod: HCNC | Performed by: INTERNAL MEDICINE

## 2024-06-04 ENCOUNTER — PATIENT MESSAGE (OUTPATIENT)
Dept: ADMINISTRATIVE | Facility: HOSPITAL | Age: 79
End: 2024-06-04
Payer: MEDICARE

## 2024-06-06 DIAGNOSIS — G72.9 MYOPATHY: ICD-10-CM

## 2024-06-06 DIAGNOSIS — D86.9 SARCOIDOSIS: ICD-10-CM

## 2024-06-06 RX ORDER — PREDNISONE 1 MG/1
TABLET ORAL
Qty: 120 TABLET | Refills: 2 | Status: SHIPPED | OUTPATIENT
Start: 2024-06-06

## 2024-06-06 RX ORDER — PREGABALIN 75 MG/1
75 CAPSULE ORAL 2 TIMES DAILY
Qty: 60 CAPSULE | Refills: 5 | Status: SHIPPED | OUTPATIENT
Start: 2024-06-06 | End: 2024-12-05

## 2024-06-10 ENCOUNTER — OFFICE VISIT (OUTPATIENT)
Dept: HEMATOLOGY/ONCOLOGY | Facility: CLINIC | Age: 79
End: 2024-06-10
Payer: MEDICARE

## 2024-06-10 VITALS
SYSTOLIC BLOOD PRESSURE: 155 MMHG | WEIGHT: 141.56 LBS | BODY MASS INDEX: 26.05 KG/M2 | RESPIRATION RATE: 18 BRPM | DIASTOLIC BLOOD PRESSURE: 77 MMHG | HEIGHT: 62 IN | HEART RATE: 89 BPM | OXYGEN SATURATION: 100 %

## 2024-06-10 DIAGNOSIS — N18.9 ANEMIA IN CHRONIC KIDNEY DISEASE, UNSPECIFIED CKD STAGE: Primary | ICD-10-CM

## 2024-06-10 DIAGNOSIS — D86.9 SARCOIDOSIS: ICD-10-CM

## 2024-06-10 DIAGNOSIS — D63.1 ANEMIA IN CHRONIC KIDNEY DISEASE, UNSPECIFIED CKD STAGE: Primary | ICD-10-CM

## 2024-06-10 DIAGNOSIS — N18.9 CHRONIC KIDNEY DISEASE, UNSPECIFIED CKD STAGE: ICD-10-CM

## 2024-06-10 DIAGNOSIS — Z53.1 REFUSAL OF BLOOD TRANSFUSIONS AS PATIENT IS JEHOVAH'S WITNESS: ICD-10-CM

## 2024-06-10 DIAGNOSIS — D50.9 IRON DEFICIENCY ANEMIA, UNSPECIFIED IRON DEFICIENCY ANEMIA TYPE: ICD-10-CM

## 2024-06-10 PROCEDURE — 3078F DIAST BP <80 MM HG: CPT | Mod: HCNC,CPTII,S$GLB, | Performed by: INTERNAL MEDICINE

## 2024-06-10 PROCEDURE — 1126F AMNT PAIN NOTED NONE PRSNT: CPT | Mod: HCNC,CPTII,S$GLB, | Performed by: INTERNAL MEDICINE

## 2024-06-10 PROCEDURE — 1157F ADVNC CARE PLAN IN RCRD: CPT | Mod: HCNC,CPTII,S$GLB, | Performed by: INTERNAL MEDICINE

## 2024-06-10 PROCEDURE — 1159F MED LIST DOCD IN RCRD: CPT | Mod: HCNC,CPTII,S$GLB, | Performed by: INTERNAL MEDICINE

## 2024-06-10 PROCEDURE — 3072F LOW RISK FOR RETINOPATHY: CPT | Mod: HCNC,CPTII,S$GLB, | Performed by: INTERNAL MEDICINE

## 2024-06-10 PROCEDURE — 1101F PT FALLS ASSESS-DOCD LE1/YR: CPT | Mod: HCNC,CPTII,S$GLB, | Performed by: INTERNAL MEDICINE

## 2024-06-10 PROCEDURE — 3077F SYST BP >= 140 MM HG: CPT | Mod: HCNC,CPTII,S$GLB, | Performed by: INTERNAL MEDICINE

## 2024-06-10 PROCEDURE — 99214 OFFICE O/P EST MOD 30 MIN: CPT | Mod: HCNC,S$GLB,, | Performed by: INTERNAL MEDICINE

## 2024-06-10 PROCEDURE — 99999 PR PBB SHADOW E&M-EST. PATIENT-LVL V: CPT | Mod: PBBFAC,HCNC,, | Performed by: INTERNAL MEDICINE

## 2024-06-10 PROCEDURE — 3288F FALL RISK ASSESSMENT DOCD: CPT | Mod: HCNC,CPTII,S$GLB, | Performed by: INTERNAL MEDICINE

## 2024-06-10 NOTE — Clinical Note
Plan IV Feraheme CBC q2wks STANDING Add Ferritin, TIBC to next lab draw-standing Cont EPO  inj q 2wks( pending lab parameters)     F/u in 3mos with cbc, TIBC and Ferritin studies prior to f/u -standing orders

## 2024-06-10 NOTE — PROGRESS NOTES
Subjective:        Patient ID: Regino Lawrence is a 78 y.o. female.    Chief Complaint: Follow-up anemia      Diagnosis: Anemia in CKD  Patient is a Yazidi  .  Prior Hx;  The patient is seen today for f/u  chronic anemia in CKD undergoing EPO injections.The patient reports that she has been diagnosed with JOLLY in the past.  She has been on oral iron supplementation therapy, but could not tolerate or did not respond, she is uncertainShe also has  undergone intermittent IV iron therapy.  She is followed by GI and has undergone a colonoscopy earlier this year and was diagnosed with hemorrhoids for which she underwent banding procedure.  No melena, hematochezia,change in bowel habits.  She has also been diagnosed with B12 deficiency in the past, but reports she did not respond to B12 injections. No history of blood transfusions.  She is a Yazidi.  She reports that she remembers getting injections in the 1970s when her blood count was low.   She is followed by Rheumatology for history of sarcoidosis with associated myopathy and arthropathy. She has been treated in the  past with methotrexate and Plaquenil, both of which were ineffectiveCellcept and imuran caused some unknown side effect. She is followed by PCP for DMShe was admitted 6/2022 with worsening SOB. She also has history of cervical spinal stenosis s/p posterior C3-C7 laminectomy and fusion on 11/16/15 by Dr. Conner.    Interval Hx; he is undergoing epo inj q 2wks  Hb 11.2 g/dL on 6/3/24    She was started on Lyrica 75 mg bid per Neurology  She continues with Chronic diffuse arthralgias   Pain better controlled with prescribed medication  She is no longer followed by pain mgmt  Keppra increased to 1g bid  Last seizure last month    She is f/b Rheumatology for sarcoidosis  In [facility]  Chronic Mild fatigue  Mild FAUSTIN-stable  No melena, hematochezia or change in bowel habits  She also has  undergone intermittent IV iron  "therapy        PAST MEDICAL HISTORY:  Acid reflux, alopecia, anemia, anxiety disorder, chronic  kidney disease, depression, diabetes mellitus type 2, hyperlipidemia,  hypertension, hypothyroidism, osteoporosis, sarcoidosis.    PAST SURGICAL HISTORY:  Cholecystectomy, , tubal ligation, carpal tunnel release, cataracts.    FAMILY HISTORY: Unremarkable for cancer. Significant for HTN.       Review of Systems   Constitutional:  Positive for fatigue (chronic, mild). Negative for activity change and appetite change.   HENT:  Negative for hearing loss and nosebleeds.    Eyes:  Negative for visual disturbance.   Respiratory:  Negative for cough and shortness of breath.    Cardiovascular:  Negative for chest pain and leg swelling.   Gastrointestinal:  Negative for abdominal pain, constipation, diarrhea and nausea.   Genitourinary:  Negative for flank pain and urgency.   Musculoskeletal:  Positive for arthralgias, back pain, joint swelling and myalgias. Negative for gait problem.   Skin:  Negative for rash.        No petechiae, ecchymoses   Neurological:  Negative for weakness, light-headedness and headaches.   Hematological:  Negative for adenopathy. Does not bruise/bleed easily.       Objective:         Vitals:    06/10/24 1405   BP: (!) 155/77   Pulse: 89   Resp: 18   SpO2: 100%   Weight: 64.2 kg (141 lb 8.6 oz)   Height: 5' 2" (1.575 m)             .Physical Exam   Constitutional: She is oriented to person, place, and time. She appears well-developed and well-nourished in motorized scooter  HENT:   Head: Normocephalic.   Eyes: Conjunctivae and lids are normal.No scleral icterus.   Neck: Normal range of motion. Neck supple.  Cardiovascular: Normal rate, regular rhythm and normal heart sounds.    No murmur heard.  Pulmonary/Chest: Breath sounds normal. She has no wheezes. She has no rales.   Abdominal: Soft. Bowel sounds are normal. There is no tenderness. There is no rebound and no guarding.   Musculoskeletal: " Ambulates w/assistance of motorized scooter  Neurological: She is alert and oriented to person, place, and time. No cranial nerve deficit.  Skin: Skin is warm and dry. No ecchymosis, no petechiae and no rash noted. No erythema.   Psychiatric: She has a normal mood and affect.               Lab Results   Component Value Date    WBC 5.15 06/03/2024    HGB 11.2 (L) 06/03/2024    HCT 36.4 (L) 06/03/2024     (H) 06/03/2024     06/03/2024     Lab Results   Component Value Date    IRON 75 06/03/2024    TIBC 457 (H) 06/03/2024    FERRITIN 74 06/03/2024     SPEP-nl      CT renal 3/6/2017   IMPRESSION:  1.  No renal, ureteral or bladder calculi.  No hydronephrosis or ureterectasis.  2.  Poorly distended bladder.  Mild bladder wall prominence.  Mild cystitis cannot be   excluded.  3.  Moderate constipation.  Normal appendix      Lab Results   Component Value Date    IRON 75 06/03/2024    TIBC 457 (H) 06/03/2024    FERRITIN 74 06/03/2024     Lab Results   Component Value Date    WBC 5.15 06/03/2024    HGB 11.2 (L) 06/03/2024    HCT 36.4 (L) 06/03/2024     (H) 06/03/2024     06/03/2024         Assessment:       1. Anemia in chronic kidney disease, unspecified CKD stage    2. Refusal of blood transfusions as patient is Moravian    3. Iron deficiency anemia, unspecified iron deficiency anemia type    4. Chronic kidney disease, unspecified CKD stage    5. Sarcoidosis              Plan:   1.2..3.    Pt is a Amish and declines/not interested in blood and blood products due to Uatsdin beliefs  She is undergoing epo inj q 2wks  Pt followed by Nephrology . It has been determined early CKD stage III, suspect due to age-related renal nephron loss, along with possible diabetic nephropathy v. hypertensive nephrosclerosis  CBC q2wks STANDING  Cont  EPO dosage  inj q 2wks( pending lab parameters)  Continue periodic monitoring of Fe studies  Hb 11.2 g/dL on 6/3/24  Fe studies reviewed  Plan IV  Fe   According to KIDIGO guidelines patients with CKD , a  gfr , <60 cc/min and anemia, iron therapy is appropriate if the Tsat is < 30 and ferritin < 500 mg/dl.    Check Fe  Follow-up with PCP for med mgmt    4 f/b nephrology  stable  Last Cr level 1.1mg/dL on 3/11/24       5. stable  F/b Rheumatology  Manifested by myopathy and arthropathy.  Persistent joint pain and myalgias . Unable to tolerate immunosuppressants/steroid sparing agents  She remains on chronic  steroids          CBC q2wks STANDING  Cont EPO  inj q 2wks( pending lab parameters)  F/u in 3mos with cbc, TIBC and Ferritin studies prior to f/u -standing orders        Advance Care Planning     Power of   I previously initiated the process of advance care planning today and explained the importance of this process to the patient.  I introduced the concept of advance directives to the patient, as well. Then the patient received detailed information about the importance of designating a Health Care Power of  (HCPOA). She was also instructed to communicate with this person about their wishes for future healthcare, should she become sick and lose decision-making capacity. The patient has  previously appointed a HCPOA. Pt completed in 2015           CC: Micaela Mendoza M.D.

## 2024-06-12 ENCOUNTER — PATIENT MESSAGE (OUTPATIENT)
Dept: RHEUMATOLOGY | Facility: CLINIC | Age: 79
End: 2024-06-12
Payer: MEDICARE

## 2024-06-14 ENCOUNTER — INFUSION (OUTPATIENT)
Dept: INFUSION THERAPY | Facility: HOSPITAL | Age: 79
End: 2024-06-14
Attending: INTERNAL MEDICINE
Payer: MEDICARE

## 2024-06-14 ENCOUNTER — PATIENT MESSAGE (OUTPATIENT)
Dept: RHEUMATOLOGY | Facility: CLINIC | Age: 79
End: 2024-06-14
Payer: MEDICARE

## 2024-06-14 ENCOUNTER — LAB VISIT (OUTPATIENT)
Dept: LAB | Facility: HOSPITAL | Age: 79
End: 2024-06-14
Attending: INTERNAL MEDICINE
Payer: MEDICARE

## 2024-06-14 VITALS
HEART RATE: 92 BPM | TEMPERATURE: 98 F | OXYGEN SATURATION: 95 % | DIASTOLIC BLOOD PRESSURE: 68 MMHG | RESPIRATION RATE: 16 BRPM | SYSTOLIC BLOOD PRESSURE: 133 MMHG

## 2024-06-14 DIAGNOSIS — D63.1 ANEMIA IN CHRONIC KIDNEY DISEASE, UNSPECIFIED CKD STAGE: ICD-10-CM

## 2024-06-14 DIAGNOSIS — N18.30 ANEMIA IN STAGE 3 CHRONIC KIDNEY DISEASE: Primary | ICD-10-CM

## 2024-06-14 DIAGNOSIS — D63.1 ANEMIA IN STAGE 3 CHRONIC KIDNEY DISEASE: Primary | ICD-10-CM

## 2024-06-14 DIAGNOSIS — G72.9 MYOPATHY: ICD-10-CM

## 2024-06-14 DIAGNOSIS — D50.9 IRON DEFICIENCY ANEMIA, UNSPECIFIED IRON DEFICIENCY ANEMIA TYPE: ICD-10-CM

## 2024-06-14 DIAGNOSIS — D63.1 ANEMIA IN STAGE 3 CHRONIC KIDNEY DISEASE, UNSPECIFIED WHETHER STAGE 3A OR 3B CKD: ICD-10-CM

## 2024-06-14 DIAGNOSIS — D86.9 SARCOIDOSIS: ICD-10-CM

## 2024-06-14 DIAGNOSIS — R53.83 FATIGUE, UNSPECIFIED TYPE: ICD-10-CM

## 2024-06-14 DIAGNOSIS — D86.86 SARCOID ARTHROPATHY: ICD-10-CM

## 2024-06-14 DIAGNOSIS — N18.30 ANEMIA IN STAGE 3 CHRONIC KIDNEY DISEASE, UNSPECIFIED WHETHER STAGE 3A OR 3B CKD: ICD-10-CM

## 2024-06-14 DIAGNOSIS — N18.9 ANEMIA IN CHRONIC KIDNEY DISEASE, UNSPECIFIED CKD STAGE: ICD-10-CM

## 2024-06-14 DIAGNOSIS — Z53.1 REFUSAL OF BLOOD TRANSFUSIONS AS PATIENT IS JEHOVAH'S WITNESS: ICD-10-CM

## 2024-06-14 LAB
ALBUMIN SERPL BCP-MCNC: 3.5 G/DL (ref 3.5–5.2)
ALP SERPL-CCNC: 87 U/L (ref 55–135)
ALT SERPL W/O P-5'-P-CCNC: 18 U/L (ref 10–44)
ANION GAP SERPL CALC-SCNC: 9 MMOL/L (ref 8–16)
AST SERPL-CCNC: 18 U/L (ref 10–40)
BASOPHILS # BLD AUTO: 0.02 K/UL (ref 0–0.2)
BASOPHILS # BLD AUTO: 0.02 K/UL (ref 0–0.2)
BASOPHILS NFR BLD: 0.5 % (ref 0–1.9)
BASOPHILS NFR BLD: 0.5 % (ref 0–1.9)
BILIRUB SERPL-MCNC: 0.2 MG/DL (ref 0.1–1)
BUN SERPL-MCNC: 23 MG/DL (ref 8–23)
CALCIUM SERPL-MCNC: 9.1 MG/DL (ref 8.7–10.5)
CHLORIDE SERPL-SCNC: 103 MMOL/L (ref 95–110)
CK SERPL-CCNC: 278 U/L (ref 20–180)
CO2 SERPL-SCNC: 25 MMOL/L (ref 23–29)
CREAT SERPL-MCNC: 1.2 MG/DL (ref 0.5–1.4)
CRP SERPL-MCNC: 7 MG/L (ref 0–8.2)
DIFFERENTIAL METHOD BLD: ABNORMAL
DIFFERENTIAL METHOD BLD: ABNORMAL
EOSINOPHIL # BLD AUTO: 0.1 K/UL (ref 0–0.5)
EOSINOPHIL # BLD AUTO: 0.1 K/UL (ref 0–0.5)
EOSINOPHIL NFR BLD: 1.1 % (ref 0–8)
EOSINOPHIL NFR BLD: 1.1 % (ref 0–8)
ERYTHROCYTE [DISTWIDTH] IN BLOOD BY AUTOMATED COUNT: 12.4 % (ref 11.5–14.5)
ERYTHROCYTE [DISTWIDTH] IN BLOOD BY AUTOMATED COUNT: 12.4 % (ref 11.5–14.5)
ERYTHROCYTE [SEDIMENTATION RATE] IN BLOOD BY PHOTOMETRIC METHOD: 12 MM/HR (ref 0–36)
EST. GFR  (NO RACE VARIABLE): 46 ML/MIN/1.73 M^2
GLUCOSE SERPL-MCNC: 167 MG/DL (ref 70–110)
HCT VFR BLD AUTO: 32.7 % (ref 37–48.5)
HCT VFR BLD AUTO: 32.7 % (ref 37–48.5)
HGB BLD-MCNC: 10.7 G/DL (ref 12–16)
HGB BLD-MCNC: 10.7 G/DL (ref 12–16)
IMM GRANULOCYTES # BLD AUTO: 0.04 K/UL (ref 0–0.04)
IMM GRANULOCYTES # BLD AUTO: 0.04 K/UL (ref 0–0.04)
IMM GRANULOCYTES NFR BLD AUTO: 0.9 % (ref 0–0.5)
IMM GRANULOCYTES NFR BLD AUTO: 0.9 % (ref 0–0.5)
LYMPHOCYTES # BLD AUTO: 1.1 K/UL (ref 1–4.8)
LYMPHOCYTES # BLD AUTO: 1.1 K/UL (ref 1–4.8)
LYMPHOCYTES NFR BLD: 25.7 % (ref 18–48)
LYMPHOCYTES NFR BLD: 25.7 % (ref 18–48)
MCH RBC QN AUTO: 33 PG (ref 27–31)
MCH RBC QN AUTO: 33 PG (ref 27–31)
MCHC RBC AUTO-ENTMCNC: 32.7 G/DL (ref 32–36)
MCHC RBC AUTO-ENTMCNC: 32.7 G/DL (ref 32–36)
MCV RBC AUTO: 101 FL (ref 82–98)
MCV RBC AUTO: 101 FL (ref 82–98)
MONOCYTES # BLD AUTO: 0.4 K/UL (ref 0.3–1)
MONOCYTES # BLD AUTO: 0.4 K/UL (ref 0.3–1)
MONOCYTES NFR BLD: 8 % (ref 4–15)
MONOCYTES NFR BLD: 8 % (ref 4–15)
NEUTROPHILS # BLD AUTO: 2.8 K/UL (ref 1.8–7.7)
NEUTROPHILS # BLD AUTO: 2.8 K/UL (ref 1.8–7.7)
NEUTROPHILS NFR BLD: 63.8 % (ref 38–73)
NEUTROPHILS NFR BLD: 63.8 % (ref 38–73)
NRBC BLD-RTO: 0 /100 WBC
NRBC BLD-RTO: 0 /100 WBC
PLATELET # BLD AUTO: 251 K/UL (ref 150–450)
PLATELET # BLD AUTO: 251 K/UL (ref 150–450)
PMV BLD AUTO: 9.3 FL (ref 9.2–12.9)
PMV BLD AUTO: 9.3 FL (ref 9.2–12.9)
POTASSIUM SERPL-SCNC: 3.5 MMOL/L (ref 3.5–5.1)
PROT SERPL-MCNC: 6.6 G/DL (ref 6–8.4)
RBC # BLD AUTO: 3.24 M/UL (ref 4–5.4)
RBC # BLD AUTO: 3.24 M/UL (ref 4–5.4)
SODIUM SERPL-SCNC: 137 MMOL/L (ref 136–145)
WBC # BLD AUTO: 4.35 K/UL (ref 3.9–12.7)
WBC # BLD AUTO: 4.35 K/UL (ref 3.9–12.7)

## 2024-06-14 PROCEDURE — 82550 ASSAY OF CK (CPK): CPT | Mod: HCNC | Performed by: INTERNAL MEDICINE

## 2024-06-14 PROCEDURE — 96372 THER/PROPH/DIAG INJ SC/IM: CPT | Mod: HCNC

## 2024-06-14 PROCEDURE — 86140 C-REACTIVE PROTEIN: CPT | Mod: HCNC | Performed by: INTERNAL MEDICINE

## 2024-06-14 PROCEDURE — 80053 COMPREHEN METABOLIC PANEL: CPT | Mod: HCNC | Performed by: INTERNAL MEDICINE

## 2024-06-14 PROCEDURE — 85025 COMPLETE CBC W/AUTO DIFF WBC: CPT | Mod: HCNC | Performed by: INTERNAL MEDICINE

## 2024-06-14 PROCEDURE — 85652 RBC SED RATE AUTOMATED: CPT | Mod: HCNC | Performed by: INTERNAL MEDICINE

## 2024-06-14 PROCEDURE — 36415 COLL VENOUS BLD VENIPUNCTURE: CPT | Mod: HCNC | Performed by: INTERNAL MEDICINE

## 2024-06-14 PROCEDURE — 82085 ASSAY OF ALDOLASE: CPT | Mod: HCNC | Performed by: INTERNAL MEDICINE

## 2024-06-14 PROCEDURE — 63600175 PHARM REV CODE 636 W HCPCS: Mod: JZ,EC,JG,HCNC | Performed by: INTERNAL MEDICINE

## 2024-06-14 RX ADMIN — EPOETIN ALFA-EPBX 40000 UNITS: 40000 INJECTION, SOLUTION INTRAVENOUS; SUBCUTANEOUS at 11:06

## 2024-06-15 LAB — ALDOLASE SERPL-CCNC: 2.4 U/L (ref 1.2–7.6)

## 2024-06-18 ENCOUNTER — PATIENT MESSAGE (OUTPATIENT)
Dept: RHEUMATOLOGY | Facility: CLINIC | Age: 79
End: 2024-06-18
Payer: MEDICARE

## 2024-06-18 ENCOUNTER — TELEPHONE (OUTPATIENT)
Dept: HEMATOLOGY/ONCOLOGY | Facility: CLINIC | Age: 79
End: 2024-06-18
Payer: MEDICARE

## 2024-06-18 NOTE — TELEPHONE ENCOUNTER
Her iron infusions have been changed per insurances request Please have someone resubmit the auth request Thanks valentina

## 2024-06-20 ENCOUNTER — OFFICE VISIT (OUTPATIENT)
Dept: RHEUMATOLOGY | Facility: CLINIC | Age: 79
End: 2024-06-20
Payer: MEDICARE

## 2024-06-20 VITALS
DIASTOLIC BLOOD PRESSURE: 85 MMHG | BODY MASS INDEX: 25.12 KG/M2 | WEIGHT: 137.38 LBS | HEART RATE: 92 BPM | SYSTOLIC BLOOD PRESSURE: 140 MMHG

## 2024-06-20 DIAGNOSIS — D86.9 SARCOIDOSIS: Primary | ICD-10-CM

## 2024-06-20 DIAGNOSIS — D86.86 SARCOID ARTHROPATHY: ICD-10-CM

## 2024-06-20 DIAGNOSIS — G72.9 MYOPATHY: ICD-10-CM

## 2024-06-20 PROCEDURE — 99999 PR PBB SHADOW E&M-EST. PATIENT-LVL V: CPT | Mod: PBBFAC,HCNC,, | Performed by: INTERNAL MEDICINE

## 2024-06-20 RX ORDER — TRIAMCINOLONE ACETONIDE 40 MG/ML
80 INJECTION, SUSPENSION INTRA-ARTICULAR; INTRAMUSCULAR
Status: COMPLETED | OUTPATIENT
Start: 2024-06-20 | End: 2024-06-20

## 2024-06-20 RX ADMIN — TRIAMCINOLONE ACETONIDE 80 MG: 40 INJECTION, SUSPENSION INTRA-ARTICULAR; INTRAMUSCULAR at 09:06

## 2024-06-20 ASSESSMENT — ROUTINE ASSESSMENT OF PATIENT INDEX DATA (RAPID3)
AM STIFFNESS SCORE: 1, YES
PAIN SCORE: 10
MDHAQ FUNCTION SCORE: 2
PSYCHOLOGICAL DISTRESS SCORE: 3.3
TOTAL RAPID3 SCORE: 8.89
PATIENT GLOBAL ASSESSMENT SCORE: 10
FATIGUE SCORE: 10

## 2024-06-20 NOTE — PROGRESS NOTES
Subjective:       Patient ID: Regino Lawrence is a 78 y.o. female.    Chief Complaint: Sarcoidosis    HPI:  Regino Lawrence is a 78 y.o. female with history of sarcoidosis with associated myopathy and   arthropathy. Sarcoidosis that was first manifested by muscle inflammation, low white   blood cell count, hair loss, skin involvement. She was treated in the   past with methotrexate and Plaquenil, both of which were ineffective.   Cellcept and Imuran caused some unknown side effect (she thinks it made her sick).   Colchicine was held due to low WBC.   Although methotrexate did not help in past it was retried and she felt it helped hair growth but did not help body aches.   She held MTX due to an URI but patient has not wanted restarted since then (2013).   Leflunomide was held due to elevated BP after less than a week of use.  July 2021 hospitalized after passing out at home and fracturing L4 .  She was thought to have a seizure. During hospitalization found to have bilateral PE and was placed on Eliquis.      Interval History:     Prior IV steroid in February helped for 2 months.  Now with flare of muscle and joint pains.  Notes episodes of swelling in feet as well as knots and indentations in skin of legs.  Has been on prednisone 4 mg daily plans to decrease to 3 mg since last visit.    Difficulty sleeping due to pain.  Pain 10/10 ache in entire body.  Activity worsens pain.  Steroid helps very little currently.  Patient requesting steroid injection.           Review of Systems   Constitutional:  Positive for fatigue. Negative for fever and unexpected weight change.   HENT:  Negative for mouth sores and trouble swallowing.         Dry mouth   Eyes:  Negative for redness.        Dry eyes   Respiratory: Negative.  Negative for cough and shortness of breath.    Cardiovascular: Negative.  Negative for chest pain.   Gastrointestinal: Negative.  Negative for constipation and diarrhea.   Endocrine: Negative.     Genitourinary: Negative.  Negative for dysuria and genital sores.   Musculoskeletal:  Positive for arthralgias, back pain, joint swelling and myalgias.   Skin: Negative.  Negative for rash.   Allergic/Immunologic: Negative.    Neurological: Negative.  Negative for headaches.   Hematological: Negative.  Does not bruise/bleed easily.   Psychiatric/Behavioral: Negative.           Objective:   BP (!) 140/85   Pulse 92   Wt 62.3 kg (137 lb 5.6 oz)   LMP  (LMP Unknown)   BMI 25.12 kg/m²   Pulse Ox 98% on RA     Physical Exam   Constitutional: She is oriented to person, place, and time.   HENT:   Head: Normocephalic and atraumatic.   Eyes: Conjunctivae are normal. Right eye discharge: kenalog.   Cardiovascular: Normal rate, regular rhythm and normal heart sounds.   Pulmonary/Chest: Effort normal and breath sounds normal.   Abdominal: Soft. Bowel sounds are normal.   Musculoskeletal:      Comments: Pain palpation of musculature of arms and legs diffusely  28 joint count: 28 tender (MCPs, PIPs, knees and shoulders) and 0 swollen     No pain on compression of MTPs        Neurological: She is alert and oriented to person, place, and time.   With motorized chair   Skin: Skin is warm and dry.   Psychiatric: Mood and affect normal.     LABS    Component      Latest Ref [facility] [place] 6/14/2024   WBC      3.90 - 12.70 K/uL 4.35    RBC      4.00 - 5.40 M/uL 3.24 (L)    Hemoglobin      12.0 - 16.0 g/dL 10.7 (L)    Hematocrit      37.0 - 48.5 % 32.7 (L)    MCV      82 - 98 fL 101 (H)    MCH      27.0 - 31.0 pg 33.0 (H)    MCHC      32.0 - 36.0 g/dL 32.7    RDW      11.5 - 14.5 % 12.4    Platelet Count      150 - 450 K/uL 251    MPV      9.2 - 12.9 fL 9.3    Immature Granulocytes      0.0 - 0.5 % 0.9 (H)    Gran # (ANC)      1.8 - 7.7 K/uL 2.8    Immature Grans (Abs)      0.00 - 0.04 K/uL 0.04    Lymph #      1.0 - 4.8 K/uL 1.1    Mono #      0.3 - 1.0 K/uL 0.4    Eos #      0.0 - 0.5 K/uL 0.1    Baso #      0.00 - 0.20 K/uL 0.02    nRBC       0 /100 WBC 0    Gran %      38.0 - 73.0 % 63.8    Lymph %      18.0 - 48.0 % 25.7    Mono %      4.0 - 15.0 % 8.0    Eos %      0.0 - 8.0 % 1.1    Basophil %      0.0 - 1.9 % 0.5    Differential Method Automated    Sodium      136 - 145 mmol/L 137    Potassium      3.5 - 5.1 mmol/L 3.5    Chloride      95 - 110 mmol/L 103    CO2      23 - 29 mmol/L 25    Glucose      70 - 110 mg/dL 167 (H)    BUN      8 - 23 mg/dL 23    Creatinine      0.5 - 1.4 mg/dL 1.2    Calcium      8.7 - 10.5 mg/dL 9.1    PROTEIN TOTAL      6.0 - 8.4 g/dL 6.6    Albumin      3.5 - 5.2 g/dL 3.5    BILIRUBIN TOTAL      0.1 - 1.0 mg/dL 0.2    ALP      55 - 135 U/L 87    AST      10 - 40 U/L 18    ALT      10 - 44 U/L 18    eGFR      >60 mL/min/1.73 m^2 46 !    Anion Gap      8 - 16 mmol/L 9    CPK      20 - 180 U/L 278 (H)    Aldolase      1.2 - 7.6 U/L 2.4    Sed Rate      0 - 36 mm/Hr 12    CRP      0.0 - 8.2 mg/L 7.0       Legend:  (L) Low  (H) High  ! Abnormal         Assessment:       1.   Sarcoidosis. Manifested by myopathy and arthropathy.  Persistent joint pain and myalgias despite prednisone 5 mg. Unable to tolerate immunosuppressants/steroid sparing agents for various reasons. Now with muscle spasms and all over body pain.   2.   Myalgia and myositis.  History of fibromyalgia   3.   Osteopenia. Took Fosamax for 5 years stopped 6/2013.  Awaiting patient decision on Prolia after she sees dentist (she has not been able to go).    4.   Fatigue     5.   Diabetes mellitus type 2 in nonobese.    6.           Neck pain. X-ray with degenerative changes. S/p laminectomy-cervical fusion C3-C7 11/16/2015 for cervical spinal stenosis.    7.           Back pain    8.           HTN.  Patient suspects BP elevated due to pain  9.           SOB.  When blood count low.   10.         Anemia.  On Procrit and intermittent IV iron  11.         Seizures.  New onset July 2021.  On chronic medication.  12.         Bilateral PE    Plan:       1. Labs   2.  Kenalog 80 mg IM.  Continue prednisone 4 mg daily with plan to decrease to 3 mg next month with alternating 3 mg/4 mg for two weeks.  Risk of elevated BP and BG discussed.  3. [employer] stopped tramadol.  Still off hydrocodone/acetaminophen from primary doctor.  [employer] stopped Flexeril so switched to tizanidine.  Tizanidine stopped after seizure but was restarted without any issues.   Continues to take seizure medication.  4. Following with nephrology  5. DXA with osteopenia of hip total and femoral neck. FRAX does not suggest treatment however if prednisone goes to 7.5 mg or more will consider Prolia (due renal insufficiency).  Information provided for patient to review.  She read information but did not see dentist to have teeth pulled.  6.  Follow with Dr. Conner regarding spine issues, fracture and pain in neck.  7.  Had COVID vaccine and booster                 RTO in 4 months.

## 2024-06-20 NOTE — PROGRESS NOTES
6/19/2024    11:31 PM   Rapid3 Question Responses and Scores   MDHAQ Score 2   Psychologic Score 3.3   Pain Score 10   When you awakened in the morning OVER THE LAST WEEK, did you feel stiff? Yes   If Yes, please indicate the number of hours until you are as limber as you will be for the day 1   Fatigue Score 10   Global Health Score 10   RAPID3 Score 8.89           Answers submitted by the patient for this visit:  Rheumatology Questionnaire (Submitted on 6/19/2024)  fever: No  eye redness: No  mouth sores: No  headaches: No  shortness of breath: Yes  chest pain: Yes  trouble swallowing: No  diarrhea: No  constipation: No  unexpected weight change: Yes  genital sore: No  dysuria: No  During the last 3 days, have you had a skin rash?: No  Bruises or bleeds easily: No  cough: No

## 2024-06-27 DIAGNOSIS — G72.9 MYOPATHY: ICD-10-CM

## 2024-06-27 DIAGNOSIS — D86.9 SARCOIDOSIS: Chronic | ICD-10-CM

## 2024-06-27 DIAGNOSIS — I10 HYPERTENSION, UNCONTROLLED: ICD-10-CM

## 2024-06-27 DIAGNOSIS — E11.22 DIABETES MELLITUS WITH STAGE 3 CHRONIC KIDNEY DISEASE: ICD-10-CM

## 2024-06-27 DIAGNOSIS — N18.30 DIABETES MELLITUS WITH STAGE 3 CHRONIC KIDNEY DISEASE: ICD-10-CM

## 2024-06-27 DIAGNOSIS — I10 ESSENTIAL HYPERTENSION: Chronic | ICD-10-CM

## 2024-06-28 ENCOUNTER — INFUSION (OUTPATIENT)
Dept: INFUSION THERAPY | Facility: HOSPITAL | Age: 79
End: 2024-06-28
Attending: INTERNAL MEDICINE
Payer: MEDICARE

## 2024-06-28 DIAGNOSIS — D63.1 ANEMIA IN CHRONIC KIDNEY DISEASE, UNSPECIFIED CKD STAGE: ICD-10-CM

## 2024-06-28 DIAGNOSIS — D63.1 ANEMIA IN STAGE 3 CHRONIC KIDNEY DISEASE: ICD-10-CM

## 2024-06-28 DIAGNOSIS — N18.30 ANEMIA IN STAGE 3 CHRONIC KIDNEY DISEASE: ICD-10-CM

## 2024-06-28 DIAGNOSIS — D50.9 IRON DEFICIENCY ANEMIA, UNSPECIFIED IRON DEFICIENCY ANEMIA TYPE: ICD-10-CM

## 2024-06-28 DIAGNOSIS — N18.30 ANEMIA IN STAGE 3 CHRONIC KIDNEY DISEASE, UNSPECIFIED WHETHER STAGE 3A OR 3B CKD: Primary | ICD-10-CM

## 2024-06-28 DIAGNOSIS — D63.1 ANEMIA IN STAGE 3 CHRONIC KIDNEY DISEASE, UNSPECIFIED WHETHER STAGE 3A OR 3B CKD: Primary | ICD-10-CM

## 2024-06-28 DIAGNOSIS — N18.9 ANEMIA IN CHRONIC KIDNEY DISEASE, UNSPECIFIED CKD STAGE: ICD-10-CM

## 2024-06-28 DIAGNOSIS — Z53.1 REFUSAL OF BLOOD TRANSFUSIONS AS PATIENT IS JEHOVAH'S WITNESS: ICD-10-CM

## 2024-06-28 PROCEDURE — 63600175 PHARM REV CODE 636 W HCPCS: Mod: HCNC | Performed by: INTERNAL MEDICINE

## 2024-06-28 PROCEDURE — 96374 THER/PROPH/DIAG INJ IV PUSH: CPT | Mod: HCNC

## 2024-06-28 PROCEDURE — 96372 THER/PROPH/DIAG INJ SC/IM: CPT | Mod: HCNC,59

## 2024-06-28 RX ORDER — FOLIC ACID 1 MG/1
1000 TABLET ORAL DAILY
Qty: 90 TABLET | Refills: 1 | Status: SHIPPED | OUTPATIENT
Start: 2024-06-28

## 2024-06-28 RX ORDER — CARVEDILOL 25 MG/1
25 TABLET ORAL 2 TIMES DAILY
Qty: 180 TABLET | Refills: 3 | Status: SHIPPED | OUTPATIENT
Start: 2024-06-28

## 2024-06-28 RX ORDER — NIFEDIPINE 90 MG/1
90 TABLET, EXTENDED RELEASE ORAL DAILY
Qty: 90 TABLET | Refills: 1 | Status: SHIPPED | OUTPATIENT
Start: 2024-06-28

## 2024-06-28 RX ORDER — BLOOD SUGAR DIAGNOSTIC
STRIP MISCELLANEOUS
Qty: 100 EACH | Refills: 3 | Status: SHIPPED | OUTPATIENT
Start: 2024-06-28

## 2024-06-28 RX ADMIN — IRON SUCROSE 100 MG: 20 INJECTION, SOLUTION INTRAVENOUS at 10:06

## 2024-06-28 RX ADMIN — EPOETIN ALFA-EPBX 40000 UNITS: 40000 INJECTION, SOLUTION INTRAVENOUS; SUBCUTANEOUS at 11:06

## 2024-06-28 NOTE — PLAN OF CARE
Pt tolerated Venofer 100mg IVP and Retacrit 40k units injection with no complaints or S&S of reaction and discharged home upon completion in [facility].

## 2024-06-28 NOTE — TELEPHONE ENCOUNTER
Care Due:                  Date            Visit Type   Department     Provider  --------------------------------------------------------------------------------                                SAME DAY -   [facility]/  Last Visit: 03-      PATIENT      INTERNAL MED   Micaela Mendoza  Next Visit: None Scheduled  None         None Found                                                            Last  Test          Frequency    Reason                     Performed    Due Date  --------------------------------------------------------------------------------    HBA1C.......  6 months...  insulin..................  03- 08-    Lipid Panel.  12 months..  atorvastatin, fenofibrate  12- 12-    TSH.........  12 months..  levothyroxine............  Not Found    Overdue    [employer] Embedded Care Due Messages. Reference number: [id number].   6/27/2024 11:18:49 PM CDT

## 2024-07-01 DIAGNOSIS — N18.30 DIABETES MELLITUS WITH STAGE 3 CHRONIC KIDNEY DISEASE: Primary | ICD-10-CM

## 2024-07-01 DIAGNOSIS — E11.22 DIABETES MELLITUS WITH STAGE 3 CHRONIC KIDNEY DISEASE: Primary | ICD-10-CM

## 2024-07-01 RX ORDER — PEN NEEDLE, DIABETIC 30 GX3/16"
NEEDLE, DISPOSABLE MISCELLANEOUS
Qty: 100 EACH | Refills: 3 | Status: SHIPPED | OUTPATIENT
Start: 2024-07-01 | End: 2024-07-05 | Stop reason: SDUPTHER

## 2024-07-01 NOTE — TELEPHONE ENCOUNTER
No care due was identified.  [facility] Embedded Care Due Messages. Reference number: [id number].   7/01/2024 9:08:22 AM CDT

## 2024-07-02 ENCOUNTER — TELEPHONE (OUTPATIENT)
Dept: FAMILY MEDICINE | Facility: CLINIC | Age: 79
End: 2024-07-02
Payer: MEDICARE

## 2024-07-02 NOTE — TELEPHONE ENCOUNTER
"----- Message from Jessie Reagan sent at 7/1/2024  4:08 PM CDT -----  Regarding: [facility]  Type:  Pharmacy Calling to Clarify an RX     Name of Caller:     Pharmacy Name:[facility]     Prescription Name:pen needle, diabetic (BD ULTRA-FINE ANA PEN NEEDLE) 32 gauge x 5/32" Ndle     What do they need to clarify?specific usage directions     Can you be contacted via [facility]?call     Best Call Back Number: [phone]     Additional Information:  "

## 2024-07-05 DIAGNOSIS — N18.30 DIABETES MELLITUS WITH STAGE 3 CHRONIC KIDNEY DISEASE: ICD-10-CM

## 2024-07-05 DIAGNOSIS — E11.22 DIABETES MELLITUS WITH STAGE 3 CHRONIC KIDNEY DISEASE: ICD-10-CM

## 2024-07-05 RX ORDER — PEN NEEDLE, DIABETIC 30 GX3/16"
NEEDLE, DISPOSABLE MISCELLANEOUS
Qty: 100 EACH | Refills: 3 | Status: SHIPPED | OUTPATIENT
Start: 2024-07-05

## 2024-07-05 NOTE — TELEPHONE ENCOUNTER
"----- Message from Juanjo Norton sent at 7/5/2024  9:15 AM CDT -----  Regarding: Joleen with [facility]  Name of Caller: Joleen     Pharmacy Name: [facility]     Prescription Name: pen needle, diabetic (BD ULTRA-FINE ANA PEN NEEDLE) 32 gauge x 5/32" Ndle    What do they need to clarify? Need a frequency     Can you be contacted via [facility]? NO     Best Call Back Number: .  [id number] - [place], [place] - [address]  [address]  [place] [address]  Phone: [phone] Fax: [phone]    Additional Information  "

## 2024-07-05 NOTE — TELEPHONE ENCOUNTER
No care due was identified.  [facility] Embedded Care Due Messages. Reference number: [id number].   7/05/2024 10:59:35 AM CDT

## 2024-07-08 ENCOUNTER — INFUSION (OUTPATIENT)
Dept: INFUSION THERAPY | Facility: HOSPITAL | Age: 79
End: 2024-07-08
Attending: INTERNAL MEDICINE
Payer: MEDICARE

## 2024-07-08 VITALS
TEMPERATURE: 98 F | OXYGEN SATURATION: 97 % | SYSTOLIC BLOOD PRESSURE: 162 MMHG | HEART RATE: 90 BPM | DIASTOLIC BLOOD PRESSURE: 77 MMHG | RESPIRATION RATE: 16 BRPM

## 2024-07-08 DIAGNOSIS — N18.30 ANEMIA IN STAGE 3 CHRONIC KIDNEY DISEASE, UNSPECIFIED WHETHER STAGE 3A OR 3B CKD: Primary | ICD-10-CM

## 2024-07-08 DIAGNOSIS — D63.1 ANEMIA IN STAGE 3 CHRONIC KIDNEY DISEASE, UNSPECIFIED WHETHER STAGE 3A OR 3B CKD: Primary | ICD-10-CM

## 2024-07-08 PROCEDURE — 96374 THER/PROPH/DIAG INJ IV PUSH: CPT | Mod: HCNC

## 2024-07-08 PROCEDURE — 63600175 PHARM REV CODE 636 W HCPCS: Mod: HCNC | Performed by: INTERNAL MEDICINE

## 2024-07-08 RX ADMIN — IRON SUCROSE 100 MG: 20 INJECTION, SOLUTION INTRAVENOUS at 12:07

## 2024-07-08 NOTE — PLAN OF CARE
Patient arrived for Venofer 2/10. Tolerated well, left unit in NAD.    Problem: Anemia  Goal: Anemia Symptom Improvement  Outcome: Met

## 2024-07-09 NOTE — TELEPHONE ENCOUNTER
No care due was identified.  [facility] Embedded Care Due Messages. Reference number: [id number].   7/09/2024 4:02:52 PM CDT

## 2024-07-10 ENCOUNTER — OFFICE VISIT (OUTPATIENT)
Dept: FAMILY MEDICINE | Facility: CLINIC | Age: 79
End: 2024-07-10
Payer: MEDICARE

## 2024-07-10 ENCOUNTER — LAB VISIT (OUTPATIENT)
Dept: LAB | Facility: HOSPITAL | Age: 79
End: 2024-07-10
Attending: FAMILY MEDICINE
Payer: MEDICARE

## 2024-07-10 VITALS
DIASTOLIC BLOOD PRESSURE: 68 MMHG | TEMPERATURE: 98 F | HEART RATE: 94 BPM | OXYGEN SATURATION: 98 % | SYSTOLIC BLOOD PRESSURE: 152 MMHG

## 2024-07-10 DIAGNOSIS — D86.9 SARCOIDOSIS: Chronic | ICD-10-CM

## 2024-07-10 DIAGNOSIS — M54.42 CHRONIC BILATERAL LOW BACK PAIN WITH LEFT-SIDED SCIATICA: ICD-10-CM

## 2024-07-10 DIAGNOSIS — G72.9 MYOPATHY: ICD-10-CM

## 2024-07-10 DIAGNOSIS — R25.2 SPASMS OF THE HANDS OR FEET: Primary | ICD-10-CM

## 2024-07-10 DIAGNOSIS — I27.20 PULMONARY HYPERTENSION, UNSPECIFIED: ICD-10-CM

## 2024-07-10 DIAGNOSIS — E11.69 COMBINED HYPERLIPIDEMIA ASSOCIATED WITH TYPE 2 DIABETES MELLITUS: Chronic | ICD-10-CM

## 2024-07-10 DIAGNOSIS — E11.3292 CONTROLLED TYPE 2 DIABETES MELLITUS WITH LEFT EYE AFFECTED BY MILD NONPROLIFERATIVE RETINOPATHY WITHOUT MACULAR EDEMA, WITHOUT LONG-TERM CURRENT USE OF INSULIN: Chronic | ICD-10-CM

## 2024-07-10 DIAGNOSIS — G89.29 CHRONIC BILATERAL LOW BACK PAIN WITH LEFT-SIDED SCIATICA: ICD-10-CM

## 2024-07-10 DIAGNOSIS — R25.2 SPASMS OF THE HANDS OR FEET: ICD-10-CM

## 2024-07-10 DIAGNOSIS — R14.0 ABDOMINAL DISTENSION: ICD-10-CM

## 2024-07-10 DIAGNOSIS — E78.2 COMBINED HYPERLIPIDEMIA ASSOCIATED WITH TYPE 2 DIABETES MELLITUS: Chronic | ICD-10-CM

## 2024-07-10 LAB
ALBUMIN SERPL BCP-MCNC: 3.8 G/DL (ref 3.5–5.2)
ALP SERPL-CCNC: 87 U/L (ref 55–135)
ALT SERPL W/O P-5'-P-CCNC: 23 U/L (ref 10–44)
ANION GAP SERPL CALC-SCNC: 13 MMOL/L (ref 8–16)
AST SERPL-CCNC: 22 U/L (ref 10–40)
BILIRUB SERPL-MCNC: 0.4 MG/DL (ref 0.1–1)
BUN SERPL-MCNC: 17 MG/DL (ref 8–23)
CALCIUM SERPL-MCNC: 9.4 MG/DL (ref 8.7–10.5)
CHLORIDE SERPL-SCNC: 103 MMOL/L (ref 95–110)
CO2 SERPL-SCNC: 26 MMOL/L (ref 23–29)
CREAT SERPL-MCNC: 1.1 MG/DL (ref 0.5–1.4)
EST. GFR  (NO RACE VARIABLE): 51 ML/MIN/1.73 M^2
GLUCOSE SERPL-MCNC: 125 MG/DL (ref 70–110)
MAGNESIUM SERPL-MCNC: 1.4 MG/DL (ref 1.6–2.6)
PHOSPHATE SERPL-MCNC: 2.7 MG/DL (ref 2.7–4.5)
POTASSIUM SERPL-SCNC: 3.4 MMOL/L (ref 3.5–5.1)
PROT SERPL-MCNC: 7.1 G/DL (ref 6–8.4)
SODIUM SERPL-SCNC: 142 MMOL/L (ref 136–145)

## 2024-07-10 PROCEDURE — 3079F DIAST BP 80-89 MM HG: CPT | Mod: HCNC,CPTII,S$GLB, | Performed by: FAMILY MEDICINE

## 2024-07-10 PROCEDURE — 36415 COLL VENOUS BLD VENIPUNCTURE: CPT | Mod: HCNC,PN | Performed by: FAMILY MEDICINE

## 2024-07-10 PROCEDURE — 1157F ADVNC CARE PLAN IN RCRD: CPT | Mod: HCNC,CPTII,S$GLB, | Performed by: FAMILY MEDICINE

## 2024-07-10 PROCEDURE — 84100 ASSAY OF PHOSPHORUS: CPT | Mod: HCNC | Performed by: FAMILY MEDICINE

## 2024-07-10 PROCEDURE — 80053 COMPREHEN METABOLIC PANEL: CPT | Mod: HCNC | Performed by: FAMILY MEDICINE

## 2024-07-10 PROCEDURE — 3077F SYST BP >= 140 MM HG: CPT | Mod: HCNC,CPTII,S$GLB, | Performed by: FAMILY MEDICINE

## 2024-07-10 PROCEDURE — 99999 PR PBB SHADOW E&M-EST. PATIENT-LVL V: CPT | Mod: PBBFAC,HCNC,, | Performed by: FAMILY MEDICINE

## 2024-07-10 PROCEDURE — 99215 OFFICE O/P EST HI 40 MIN: CPT | Mod: HCNC,S$GLB,, | Performed by: FAMILY MEDICINE

## 2024-07-10 PROCEDURE — 1159F MED LIST DOCD IN RCRD: CPT | Mod: HCNC,CPTII,S$GLB, | Performed by: FAMILY MEDICINE

## 2024-07-10 PROCEDURE — 83735 ASSAY OF MAGNESIUM: CPT | Mod: HCNC | Performed by: FAMILY MEDICINE

## 2024-07-10 PROCEDURE — 1125F AMNT PAIN NOTED PAIN PRSNT: CPT | Mod: HCNC,CPTII,S$GLB, | Performed by: FAMILY MEDICINE

## 2024-07-10 PROCEDURE — 3072F LOW RISK FOR RETINOPATHY: CPT | Mod: HCNC,CPTII,S$GLB, | Performed by: FAMILY MEDICINE

## 2024-07-10 RX ORDER — ATORVASTATIN CALCIUM 20 MG/1
20 TABLET, FILM COATED ORAL DAILY
Qty: 90 TABLET | Refills: 1 | Status: SHIPPED | OUTPATIENT
Start: 2024-07-10

## 2024-07-10 RX ORDER — FOLIC ACID 1 MG/1
1000 TABLET ORAL DAILY
Qty: 90 TABLET | Refills: 1 | Status: SHIPPED | OUTPATIENT
Start: 2024-07-10

## 2024-07-10 RX ORDER — DICLOFENAC SODIUM 1 MG/ML
1 SOLUTION/ DROPS OPHTHALMIC 4 TIMES DAILY
COMMUNITY

## 2024-07-10 NOTE — PROGRESS NOTES
Chief Complaint   Patient presents with    Follow-up     3m follow up  Sharp pain in lower abdomen going to back  Knots occurring on both legs and thighs causing patient to lose balance (swelling and dents inside legs as well x 1m   Concerns of side affects from medications       HPI  Regino Lawrence is a 78 y.o. female with multiple medical diagnoses as listed in the medical history and problem list that presents for follow-up for chronic medical conditions    Knots on her legs- she has had her feet and hands drawing up along with areas of her leg that ball up or dent, she has photographs of this, sx just started a month ago  Abdominal pain- in right lower abdomen going to the back, also occurs on the left and radiates to her back she takes linzess and does not have constipation, she does have bladder leakage and last week she did not urinate as much even with drinking water  Medication concerns- she started lyrica in April and is concerned that is causing side effects  Seizure d/o - has not had a seizure since starting keppra with the lyrica      ALLERGIES AND MEDICATIONS: updated and reviewed.  Review of patient's allergies indicates:   Allergen Reactions    Azathioprine Shortness Of Breath and Other (See Comments)     Fatigue     Medication List with Changes/Refills   Current Medications    ACCU-CHEK FASTCLIX LANCING DEV KIT    USE AS DIRECTED.    ALBUTEROL-IPRATROPIUM (DUO-NEB) 2.5 MG-0.5 MG/3 ML NEBULIZER SOLUTION    Take 3 mLs by nebulization every 4 (four) hours as needed for Wheezing or Shortness of Breath. Rescue    APIXABAN (ELIQUIS) 5 MG TAB    Take 1 tablet (5 mg total) by mouth 2 (two) times daily. Start this prescription after finishing starter pack    BLOOD SUGAR DIAGNOSTIC (ACCU-CHEK SMARTVIEW TEST STRIP) STRP    Use as directed to check blood sugar twice daily.    BLOOD SUGAR DIAGNOSTIC STRP    To check BG three times daily, to use with insurance preferred meter    BLOOD-GLUCOSE METER KIT    Use as  "instructed    CARVEDILOL (COREG) 25 MG TABLET    Take 1 tablet (25 mg total) by mouth 2 (two) times daily.    DICLOFENAC (VOLTAREN) 0.1 % OPHTHALMIC SOLUTION    Place 1 drop into both eyes 4 (four) times daily.    DICLOFENAC SODIUM (VOLTAREN) 1 % GEL    Apply 2 g topically once daily.    DICLOFENAC SODIUM (VOLTAREN) 1 % GEL    Apply 2 g topically once daily.    DULOXETINE (CYMBALTA) 60 MG CAPSULE    Take 1 capsule (60 mg total) by mouth once daily.    FENOFIBRATE 160 MG TAB    Take 1 tablet (160 mg total) by mouth once daily.    FLUTICASONE PROPION-SALMETEROL 115-21 MCG/DOSE (ADVAIR HFA) 115-21 MCG/ACTUATION HFAA INHALER    Inhale 2 puffs into the lungs every 12 (twelve) hours. Controller    INSULIN (LANTUS SOLOSTAR U-100 INSULIN) GLARGINE 100 UNITS/ML SUBQ PEN    Inject 10 Units into the skin every evening.    LEVETIRACETAM (KEPPRA) 1000 MG TABLET    Take 1 tablet (1,000 mg total) by mouth 2 (two) times daily.    LEVOTHYROXINE (SYNTHROID) 50 MCG TABLET    Take 1 tablet (50 mcg total) by mouth before breakfast.    LINZESS 72 MCG CAP CAPSULE    Take 72 mcg by mouth every morning.    NIFEDIPINE (PROCARDIA-XL) 90 MG (OSM) 24 HR TABLET    Take 1 tablet (90 mg total) by mouth once daily.    PANTOPRAZOLE (PROTONIX) 40 MG TABLET    Take 1 tablet (40 mg total) by mouth once daily.    PEN NEEDLE, DIABETIC (BD ULTRA-FINE ANA PEN NEEDLE) 32 GAUGE X 5/32" NDLE    For use with insulin pen    PREDNISONE (DELTASONE) 1 MG TABLET    TAKE 4 TABLETS BY MOUTH ONCE DAILY FOR  ONE  MONTH  THEN  DECREASE  BY  1  TABLET  EVERY  MONTH  IF  LABS  ALLOW    PREGABALIN (LYRICA) 75 MG CAPSULE    Take 1 capsule (75 mg total) by mouth 2 (two) times daily.    TIZANIDINE (ZANAFLEX) 2 MG TABLET    Take 2 tablets (4 mg total) by mouth every 8 (eight) hours as needed (muscle spasm).    TRIAZOLAM (HALCION) 0.25 MG TAB       Changed and/or Refilled Medications    Modified Medication Previous Medication    ATORVASTATIN (LIPITOR) 20 MG TABLET atorvastatin " (LIPITOR) 20 MG tablet       Take 1 tablet (20 mg total) by mouth once daily.    Take 1 tablet (20 mg total) by mouth once daily.    FOLIC ACID (FOLVITE) 1 MG TABLET folic acid (FOLVITE) 1 MG tablet       Take 1 tablet (1,000 mcg total) by mouth once daily.    Take 1 tablet (1,000 mcg total) by mouth once daily.   Discontinued Medications    ACETAMINOPHEN-CODEINE 300-30MG (TYLENOL #3) 300-30 MG TAB    Take 1 tablet by mouth every 6 (six) hours as needed.    ALCOHOL ANTISEPTIC PADS (ALCOHOL PREP PADS TOP)        CALCIUM CARBONATE (OS-MALCOLM) 600 MG CALCIUM (1,500 MG) TAB    Take 1 tablet by mouth 2 (two) times daily.     CETIRIZINE (ZYRTEC) 10 MG TABLET    Take 1 tablet (10 mg total) by mouth once daily.    HYDRALAZINE (APRESOLINE) 25 MG TABLET    Take 2 tabs every 8 hours by mouth. Check BP 2 hours after each dose and if Blood pressure >160- please take 1 extra tab    KLGA KETOPROFEN 10% LIDOCAINE 10% GABAPENTIN 6% AMITRIPTYLINE 2% IN TRANSDERMAL CREAM    Apply 1 to 2 grams 3 to 4 times daily    OLOPATADINE (PATANOL) 0.1 % OPHTHALMIC SOLUTION    Place 1 drop into both eyes daily as needed for Allergies.       ROS  Review of Systems   Constitutional:  Negative for chills, diaphoresis, fatigue, fever and unexpected weight change.   HENT:  Negative for rhinorrhea, sinus pressure, sore throat and tinnitus.    Eyes:  Negative for photophobia and visual disturbance.   Respiratory:  Negative for cough, shortness of breath and wheezing.    Cardiovascular:  Negative for chest pain and palpitations.   Gastrointestinal:  Positive for abdominal pain. Negative for blood in stool, constipation, diarrhea, nausea and vomiting.   Genitourinary:  Negative for dysuria, flank pain, frequency and vaginal discharge.   Musculoskeletal:  Positive for arthralgias and myalgias. Negative for joint swelling.   Skin:  Negative for rash.   Neurological:  Negative for speech difficulty, weakness, light-headedness and headaches.    Psychiatric/Behavioral:  Negative for behavioral problems and dysphoric mood.        Physical Exam  Vitals:    07/10/24 0929 07/10/24 1030   BP: (!) 178/82  Comment: just taken medication (!) 152/68   BP Location: Left arm    Patient Position: Sitting    BP Method: Large (Manual)    Pulse: 94    Temp: 97.9 °F (36.6 °C)    TempSrc: Oral    SpO2: 98%     There is no height or weight on file to calculate BMI.            Physical Exam  Vitals reviewed.   Constitutional:       General: She is not in acute distress.     Appearance: She is well-developed.   HENT:      Head: Normocephalic and atraumatic.   Cardiovascular:      Rate and Rhythm: Normal rate and regular rhythm.      Heart sounds: No murmur heard.     No friction rub. No gallop.   Pulmonary:      Effort: Pulmonary effort is normal. No respiratory distress.      Breath sounds: Normal breath sounds. No wheezing or rales.   Abdominal:      General: Bowel sounds are normal. There is distension.      Tenderness: There is no guarding or rebound.      Comments: Lower abdominal TTP bilaterally   Skin:     General: Skin is warm and dry.      Findings: No rash.   Neurological:      Mental Status: She is alert. Mental status is at baseline.   Psychiatric:         Behavior: Behavior normal.         Thought Content: Thought content normal.         Health Maintenance         Date Due Completion Date    RSV Vaccine (Age 60+ and Pregnant patients) (1 - 1-dose 60+ series) Never done ---    Shingles Vaccine (1 of 2) 07/20/2015 5/25/2015    Mammogram 09/14/2022 9/14/2020    Lipid Panel 12/09/2023 12/9/2022    COVID-19 Vaccine (6 - 2023-24 season) 12/29/2023 11/3/2023    Influenza Vaccine (1) 09/01/2024 11/3/2023    Override on 8/8/2018: Done    Override on 9/27/2017: Done    Hemoglobin A1c 09/01/2024 3/1/2024    Diabetes Urine Screening 09/08/2024 9/8/2023    Eye Exam 10/25/2024 10/25/2023    DEXA Scan 11/07/2024 11/7/2022    TETANUS VACCINE 05/16/2026 5/16/2016             Health maintenance reviewed and addressed as ordered      ASSESSMENT/PLAN       1. Spasms of the hands or feet  Check lytes  Recommend she f/u with neurologist for evaluation  Unsure if this is due to lyrica will consult neuro pending lab results  - Magnesium; Future  - PHOSPHORUS; Future  - Comprehensive Metabolic Panel; Future    2. Sarcoidosis  Continue current regimen  Has f/u with rheumatology  - folic acid (FOLVITE) 1 MG tablet; Take 1 tablet (1,000 mcg total) by mouth once daily.  Dispense: 90 tablet; Refill: 1    3. Myopathy  Has f/u with rheumatology  CK level elevated  - folic acid (FOLVITE) 1 MG tablet; Take 1 tablet (1,000 mcg total) by mouth once daily.  Dispense: 90 tablet; Refill: 1    4. Combined 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  Continue current regimen    - atorvastatin (LIPITOR) 20 MG tablet; Take 1 tablet (20 mg total) by mouth once daily.  Dispense: 90 tablet; Refill: 1    5. 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 Chronic bilateral low back pain with left-sided sciatica  Hx of DJD unsure if this is contributing to spasms    7. Abdominal distension  Imaging due to distention and pain  - US Abdomen Complete; Future    8. Pulmonary hypertension, unspecified  stable    Micaela Mendoza MD  07/10/2024 9:33 AM        Follow up in about 3 months (around 10/10/2024).    Orders Placed This Encounter   Procedures    US Abdomen Complete    Magnesium    PHOSPHORUS    Comprehensive Metabolic Panel

## 2024-07-12 ENCOUNTER — LAB VISIT (OUTPATIENT)
Dept: LAB | Facility: HOSPITAL | Age: 79
End: 2024-07-12
Attending: INTERNAL MEDICINE
Payer: MEDICARE

## 2024-07-12 ENCOUNTER — INFUSION (OUTPATIENT)
Dept: INFUSION THERAPY | Facility: HOSPITAL | Age: 79
End: 2024-07-12
Attending: INTERNAL MEDICINE
Payer: MEDICARE

## 2024-07-12 VITALS
HEART RATE: 97 BPM | TEMPERATURE: 98 F | DIASTOLIC BLOOD PRESSURE: 79 MMHG | SYSTOLIC BLOOD PRESSURE: 165 MMHG | RESPIRATION RATE: 18 BRPM | OXYGEN SATURATION: 100 %

## 2024-07-12 DIAGNOSIS — D63.1 ANEMIA IN CHRONIC KIDNEY DISEASE, UNSPECIFIED CKD STAGE: ICD-10-CM

## 2024-07-12 DIAGNOSIS — N18.9 ANEMIA IN CHRONIC KIDNEY DISEASE, UNSPECIFIED CKD STAGE: ICD-10-CM

## 2024-07-12 DIAGNOSIS — N18.30 ANEMIA IN STAGE 3 CHRONIC KIDNEY DISEASE, UNSPECIFIED WHETHER STAGE 3A OR 3B CKD: Primary | ICD-10-CM

## 2024-07-12 DIAGNOSIS — D50.9 IRON DEFICIENCY ANEMIA, UNSPECIFIED IRON DEFICIENCY ANEMIA TYPE: ICD-10-CM

## 2024-07-12 DIAGNOSIS — D63.1 ANEMIA IN STAGE 3 CHRONIC KIDNEY DISEASE, UNSPECIFIED WHETHER STAGE 3A OR 3B CKD: Primary | ICD-10-CM

## 2024-07-12 LAB
BASOPHILS # BLD AUTO: 0.02 K/UL (ref 0–0.2)
BASOPHILS NFR BLD: 0.4 % (ref 0–1.9)
DIFFERENTIAL METHOD BLD: ABNORMAL
EOSINOPHIL # BLD AUTO: 0.1 K/UL (ref 0–0.5)
EOSINOPHIL NFR BLD: 1 % (ref 0–8)
ERYTHROCYTE [DISTWIDTH] IN BLOOD BY AUTOMATED COUNT: 13.2 % (ref 11.5–14.5)
HCT VFR BLD AUTO: 35.8 % (ref 37–48.5)
HGB BLD-MCNC: 11.2 G/DL (ref 12–16)
IMM GRANULOCYTES # BLD AUTO: 0.05 K/UL (ref 0–0.04)
IMM GRANULOCYTES NFR BLD AUTO: 1 % (ref 0–0.5)
LYMPHOCYTES # BLD AUTO: 1.3 K/UL (ref 1–4.8)
LYMPHOCYTES NFR BLD: 24.8 % (ref 18–48)
MCH RBC QN AUTO: 32.5 PG (ref 27–31)
MCHC RBC AUTO-ENTMCNC: 31.3 G/DL (ref 32–36)
MCV RBC AUTO: 104 FL (ref 82–98)
MONOCYTES # BLD AUTO: 0.4 K/UL (ref 0.3–1)
MONOCYTES NFR BLD: 8.3 % (ref 4–15)
NEUTROPHILS # BLD AUTO: 3.3 K/UL (ref 1.8–7.7)
NEUTROPHILS NFR BLD: 64.5 % (ref 38–73)
NRBC BLD-RTO: 0 /100 WBC
PLATELET # BLD AUTO: 279 K/UL (ref 150–450)
PMV BLD AUTO: 9.4 FL (ref 9.2–12.9)
RBC # BLD AUTO: 3.45 M/UL (ref 4–5.4)
WBC # BLD AUTO: 5.17 K/UL (ref 3.9–12.7)

## 2024-07-12 PROCEDURE — 85025 COMPLETE CBC W/AUTO DIFF WBC: CPT | Mod: HCNC | Performed by: INTERNAL MEDICINE

## 2024-07-12 PROCEDURE — 96374 THER/PROPH/DIAG INJ IV PUSH: CPT | Mod: HCNC

## 2024-07-12 PROCEDURE — 63600175 PHARM REV CODE 636 W HCPCS: Mod: HCNC | Performed by: INTERNAL MEDICINE

## 2024-07-12 PROCEDURE — 36415 COLL VENOUS BLD VENIPUNCTURE: CPT | Mod: HCNC | Performed by: INTERNAL MEDICINE

## 2024-07-12 RX ADMIN — IRON SUCROSE 100 MG: 20 INJECTION, SOLUTION INTRAVENOUS at 11:07

## 2024-07-12 NOTE — PLAN OF CARE
Labs reviewed. HgB 11.2. Retacrit held. Venofer #3/10 tolerated well w/o any acute adverse reactions. Pt left unit in NAD, free of falls/injury.    Problem: Anemia  Goal: Anemia Symptom Improvement  Outcome: Met

## 2024-07-16 RX ORDER — LINACLOTIDE 72 UG/1
72 CAPSULE, GELATIN COATED ORAL
Qty: 90 CAPSULE | Refills: 1 | Status: SHIPPED | OUTPATIENT
Start: 2024-07-16

## 2024-07-16 NOTE — TELEPHONE ENCOUNTER
Refill Routing Note   Medication(s) are not appropriate for processing by [facility] for the following reason(s):        Outside of protocol    [facility] action(s):  Route               Appointments  past 12m or future 3m with PCP    Date Provider   Last Visit   3/18/2024 Micaela Mendoza MD   Next Visit   7/10/2024 Micaela Mendoza MD   ED visits in past 90 days: 0        Note composed:11:50 AM 07/16/2024

## 2024-07-17 ENCOUNTER — PATIENT MESSAGE (OUTPATIENT)
Dept: FAMILY MEDICINE | Facility: CLINIC | Age: 79
End: 2024-07-17
Payer: MEDICARE

## 2024-07-17 DIAGNOSIS — N18.31 STAGE 3A CHRONIC KIDNEY DISEASE: ICD-10-CM

## 2024-07-17 DIAGNOSIS — R25.2 SPASMS OF THE HANDS OR FEET: Primary | ICD-10-CM

## 2024-07-19 ENCOUNTER — TELEPHONE (OUTPATIENT)
Dept: FAMILY MEDICINE | Facility: CLINIC | Age: 79
End: 2024-07-19
Payer: MEDICARE

## 2024-07-19 ENCOUNTER — INFUSION (OUTPATIENT)
Dept: INFUSION THERAPY | Facility: HOSPITAL | Age: 79
End: 2024-07-19
Attending: INTERNAL MEDICINE
Payer: MEDICARE

## 2024-07-19 ENCOUNTER — HOSPITAL ENCOUNTER (OUTPATIENT)
Dept: RADIOLOGY | Facility: HOSPITAL | Age: 79
Discharge: HOME | End: 2024-07-19
Attending: FAMILY MEDICINE
Payer: MEDICARE

## 2024-07-19 VITALS
HEART RATE: 88 BPM | SYSTOLIC BLOOD PRESSURE: 171 MMHG | RESPIRATION RATE: 18 BRPM | OXYGEN SATURATION: 97 % | TEMPERATURE: 99 F | DIASTOLIC BLOOD PRESSURE: 85 MMHG

## 2024-07-19 DIAGNOSIS — R14.0 ABDOMINAL DISTENSION: ICD-10-CM

## 2024-07-19 DIAGNOSIS — D63.1 ANEMIA IN STAGE 3 CHRONIC KIDNEY DISEASE, UNSPECIFIED WHETHER STAGE 3A OR 3B CKD: Primary | ICD-10-CM

## 2024-07-19 DIAGNOSIS — R14.0 ABDOMINAL DISTENSION: Primary | ICD-10-CM

## 2024-07-19 DIAGNOSIS — N18.30 ANEMIA IN STAGE 3 CHRONIC KIDNEY DISEASE, UNSPECIFIED WHETHER STAGE 3A OR 3B CKD: Primary | ICD-10-CM

## 2024-07-19 PROCEDURE — 96374 THER/PROPH/DIAG INJ IV PUSH: CPT | Mod: HCNC

## 2024-07-19 PROCEDURE — 63600175 PHARM REV CODE 636 W HCPCS: Mod: HCNC | Performed by: INTERNAL MEDICINE

## 2024-07-19 PROCEDURE — 76700 US EXAM ABDOM COMPLETE: CPT | Mod: TC,HCNC

## 2024-07-19 PROCEDURE — 76700 US EXAM ABDOM COMPLETE: CPT | Mod: 26,HCNC,, | Performed by: RADIOLOGY

## 2024-07-19 RX ADMIN — IRON SUCROSE 100 MG: 20 INJECTION, SOLUTION INTRAVENOUS at 11:07

## 2024-07-19 NOTE — TELEPHONE ENCOUNTER
Called patient twice, busy dial tone. Patient phone malfunctioning. Sent a message through the portal.

## 2024-07-19 NOTE — TELEPHONE ENCOUNTER
Please let her know I am ordering an US of her bladder since her abdominal US did not show a cause for her swelling    Okay to schedule the US    Micaela Mendoza MD

## 2024-07-19 NOTE — PLAN OF CARE
Patient arrived to unit in wheelchair with daughter as escort. I assisted patient from transfering from wheelchair to chair to Venofer IVP injection. Patient mentioned the injection is to help her CKD, stage 3. Patient mentions she has generalized problems with pain from osteoarthritis, but not during visit. Patient tolerated Venofer injection without difficulty. I assisted patient with transferring from chair to wheelchair. Patient left unit in wheelchair and daughter as escort.

## 2024-07-22 ENCOUNTER — HOSPITAL ENCOUNTER (OUTPATIENT)
Dept: RADIOLOGY | Facility: HOSPITAL | Age: 79
Discharge: HOME | End: 2024-07-22
Attending: FAMILY MEDICINE
Payer: MEDICARE

## 2024-07-22 DIAGNOSIS — R14.0 ABDOMINAL DISTENSION: ICD-10-CM

## 2024-07-22 PROCEDURE — 76857 US EXAM PELVIC LIMITED: CPT | Mod: 26,HCNC,, | Performed by: INTERNAL MEDICINE

## 2024-07-22 PROCEDURE — 76857 US EXAM PELVIC LIMITED: CPT | Mod: TC,HCNC

## 2024-07-26 ENCOUNTER — LAB VISIT (OUTPATIENT)
Dept: LAB | Facility: HOSPITAL | Age: 79
End: 2024-07-26
Attending: INTERNAL MEDICINE
Payer: MEDICARE

## 2024-07-26 ENCOUNTER — INFUSION (OUTPATIENT)
Dept: INFUSION THERAPY | Facility: HOSPITAL | Age: 79
End: 2024-07-26
Attending: INTERNAL MEDICINE
Payer: MEDICARE

## 2024-07-26 VITALS
DIASTOLIC BLOOD PRESSURE: 69 MMHG | RESPIRATION RATE: 16 BRPM | OXYGEN SATURATION: 99 % | TEMPERATURE: 98 F | HEART RATE: 84 BPM | SYSTOLIC BLOOD PRESSURE: 143 MMHG

## 2024-07-26 DIAGNOSIS — D63.1 ANEMIA IN CHRONIC KIDNEY DISEASE, UNSPECIFIED CKD STAGE: ICD-10-CM

## 2024-07-26 DIAGNOSIS — N18.30 ANEMIA IN STAGE 3 CHRONIC KIDNEY DISEASE: ICD-10-CM

## 2024-07-26 DIAGNOSIS — R25.2 SPASMS OF THE HANDS OR FEET: ICD-10-CM

## 2024-07-26 DIAGNOSIS — Z53.1 REFUSAL OF BLOOD TRANSFUSIONS AS PATIENT IS JEHOVAH'S WITNESS: ICD-10-CM

## 2024-07-26 DIAGNOSIS — N18.9 ANEMIA IN CHRONIC KIDNEY DISEASE, UNSPECIFIED CKD STAGE: ICD-10-CM

## 2024-07-26 DIAGNOSIS — D63.1 ANEMIA IN STAGE 3 CHRONIC KIDNEY DISEASE, UNSPECIFIED WHETHER STAGE 3A OR 3B CKD: Primary | ICD-10-CM

## 2024-07-26 DIAGNOSIS — D50.9 IRON DEFICIENCY ANEMIA, UNSPECIFIED IRON DEFICIENCY ANEMIA TYPE: ICD-10-CM

## 2024-07-26 DIAGNOSIS — D63.1 ANEMIA IN STAGE 3 CHRONIC KIDNEY DISEASE: ICD-10-CM

## 2024-07-26 DIAGNOSIS — N18.30 ANEMIA IN STAGE 3 CHRONIC KIDNEY DISEASE, UNSPECIFIED WHETHER STAGE 3A OR 3B CKD: Primary | ICD-10-CM

## 2024-07-26 LAB
ALBUMIN SERPL BCP-MCNC: 3.4 G/DL (ref 3.5–5.2)
ALP SERPL-CCNC: 78 U/L (ref 55–135)
ALT SERPL W/O P-5'-P-CCNC: 20 U/L (ref 10–44)
ANION GAP SERPL CALC-SCNC: 11 MMOL/L (ref 8–16)
AST SERPL-CCNC: 21 U/L (ref 10–40)
BASOPHILS # BLD AUTO: 0.02 K/UL (ref 0–0.2)
BASOPHILS NFR BLD: 0.4 % (ref 0–1.9)
BILIRUB SERPL-MCNC: 0.4 MG/DL (ref 0.1–1)
BUN SERPL-MCNC: 25 MG/DL (ref 8–23)
CALCIUM SERPL-MCNC: 8.8 MG/DL (ref 8.7–10.5)
CHLORIDE SERPL-SCNC: 99 MMOL/L (ref 95–110)
CO2 SERPL-SCNC: 23 MMOL/L (ref 23–29)
CREAT SERPL-MCNC: 1.2 MG/DL (ref 0.5–1.4)
DIFFERENTIAL METHOD BLD: ABNORMAL
EOSINOPHIL # BLD AUTO: 0.1 K/UL (ref 0–0.5)
EOSINOPHIL NFR BLD: 1.4 % (ref 0–8)
ERYTHROCYTE [DISTWIDTH] IN BLOOD BY AUTOMATED COUNT: 13.1 % (ref 11.5–14.5)
EST. GFR  (NO RACE VARIABLE): 46 ML/MIN/1.73 M^2
GLUCOSE SERPL-MCNC: 127 MG/DL (ref 70–110)
HCT VFR BLD AUTO: 33 % (ref 37–48.5)
HGB BLD-MCNC: 10.3 G/DL (ref 12–16)
IMM GRANULOCYTES # BLD AUTO: 0.07 K/UL (ref 0–0.04)
IMM GRANULOCYTES NFR BLD AUTO: 1.4 % (ref 0–0.5)
LYMPHOCYTES # BLD AUTO: 1.2 K/UL (ref 1–4.8)
LYMPHOCYTES NFR BLD: 24.1 % (ref 18–48)
MAGNESIUM SERPL-MCNC: 1.4 MG/DL (ref 1.6–2.6)
MCH RBC QN AUTO: 32.5 PG (ref 27–31)
MCHC RBC AUTO-ENTMCNC: 31.2 G/DL (ref 32–36)
MCV RBC AUTO: 104 FL (ref 82–98)
MONOCYTES # BLD AUTO: 0.5 K/UL (ref 0.3–1)
MONOCYTES NFR BLD: 10.2 % (ref 4–15)
NEUTROPHILS # BLD AUTO: 3.1 K/UL (ref 1.8–7.7)
NEUTROPHILS NFR BLD: 62.5 % (ref 38–73)
NRBC BLD-RTO: 0 /100 WBC
PLATELET # BLD AUTO: 253 K/UL (ref 150–450)
PMV BLD AUTO: 9.9 FL (ref 9.2–12.9)
POTASSIUM SERPL-SCNC: 3.6 MMOL/L (ref 3.5–5.1)
PROT SERPL-MCNC: 6.3 G/DL (ref 6–8.4)
RBC # BLD AUTO: 3.17 M/UL (ref 4–5.4)
SODIUM SERPL-SCNC: 133 MMOL/L (ref 136–145)
WBC # BLD AUTO: 4.9 K/UL (ref 3.9–12.7)

## 2024-07-26 PROCEDURE — 83735 ASSAY OF MAGNESIUM: CPT | Mod: HCNC | Performed by: FAMILY MEDICINE

## 2024-07-26 PROCEDURE — 36415 COLL VENOUS BLD VENIPUNCTURE: CPT | Mod: HCNC | Performed by: FAMILY MEDICINE

## 2024-07-26 PROCEDURE — 63600175 PHARM REV CODE 636 W HCPCS: Mod: HCNC | Performed by: INTERNAL MEDICINE

## 2024-07-26 PROCEDURE — 96374 THER/PROPH/DIAG INJ IV PUSH: CPT | Mod: HCNC

## 2024-07-26 PROCEDURE — 85025 COMPLETE CBC W/AUTO DIFF WBC: CPT | Mod: HCNC | Performed by: INTERNAL MEDICINE

## 2024-07-26 PROCEDURE — 80053 COMPREHEN METABOLIC PANEL: CPT | Mod: HCNC | Performed by: FAMILY MEDICINE

## 2024-07-26 PROCEDURE — 96372 THER/PROPH/DIAG INJ SC/IM: CPT | Mod: HCNC,59

## 2024-07-26 RX ADMIN — EPOETIN ALFA-EPBX 40000 UNITS: 40000 INJECTION, SOLUTION INTRAVENOUS; SUBCUTANEOUS at 11:07

## 2024-07-26 RX ADMIN — IRON SUCROSE 100 MG: 20 INJECTION, SOLUTION INTRAVENOUS at 11:07

## 2024-07-26 NOTE — PLAN OF CARE
Patient arrives on unit in wheelchair escorted by her daughter. Patient transfers herself from wheelchair to chair. Patient is calm and pleasant during assessment. Patient tolerates her retacrit SC injection and venofer IVP. Patient transfers herself from chair to wheelchair. Patient escorted by daughter off unit for discharge.

## 2024-07-29 ENCOUNTER — PATIENT MESSAGE (OUTPATIENT)
Dept: FAMILY MEDICINE | Facility: CLINIC | Age: 79
End: 2024-07-29
Payer: MEDICARE

## 2024-07-30 ENCOUNTER — OFFICE VISIT (OUTPATIENT)
Dept: HOME HEALTH SERVICES | Facility: CLINIC | Age: 79
End: 2024-07-30
Payer: MEDICARE

## 2024-07-30 ENCOUNTER — TELEPHONE (OUTPATIENT)
Dept: NEUROLOGY | Facility: CLINIC | Age: 79
End: 2024-07-30
Payer: MEDICARE

## 2024-07-30 VITALS
SYSTOLIC BLOOD PRESSURE: 132 MMHG | DIASTOLIC BLOOD PRESSURE: 80 MMHG | HEART RATE: 97 BPM | HEIGHT: 62 IN | OXYGEN SATURATION: 100 % | BODY MASS INDEX: 25.28 KG/M2 | WEIGHT: 137.38 LBS | RESPIRATION RATE: 16 BRPM | TEMPERATURE: 97 F

## 2024-07-30 DIAGNOSIS — E11.69 COMBINED HYPERLIPIDEMIA ASSOCIATED WITH TYPE 2 DIABETES MELLITUS: Chronic | ICD-10-CM

## 2024-07-30 DIAGNOSIS — D63.1 ANEMIA IN STAGE 3A CHRONIC KIDNEY DISEASE: ICD-10-CM

## 2024-07-30 DIAGNOSIS — Z79.52 IMMUNOSUPPRESSION DUE TO CHRONIC STEROID USE: ICD-10-CM

## 2024-07-30 DIAGNOSIS — J84.10 CALCIFIED GRANULOMA OF LUNG: ICD-10-CM

## 2024-07-30 DIAGNOSIS — I27.20 PULMONARY HYPERTENSION, UNSPECIFIED: ICD-10-CM

## 2024-07-30 DIAGNOSIS — K21.9 GASTROESOPHAGEAL REFLUX DISEASE, UNSPECIFIED WHETHER ESOPHAGITIS PRESENT: ICD-10-CM

## 2024-07-30 DIAGNOSIS — I70.0 AORTIC ATHEROSCLEROSIS: ICD-10-CM

## 2024-07-30 DIAGNOSIS — Z99.89 DEPENDENCE ON OTHER ENABLING MACHINES AND DEVICES: ICD-10-CM

## 2024-07-30 DIAGNOSIS — E11.59 HYPERTENSION ASSOCIATED WITH DIABETES: ICD-10-CM

## 2024-07-30 DIAGNOSIS — R26.9 ABNORMALITY OF GAIT AND MOBILITY: ICD-10-CM

## 2024-07-30 DIAGNOSIS — M46.1 SACROILIITIS: ICD-10-CM

## 2024-07-30 DIAGNOSIS — J44.9 CHRONIC OBSTRUCTIVE PULMONARY DISEASE, UNSPECIFIED COPD TYPE: ICD-10-CM

## 2024-07-30 DIAGNOSIS — T38.0X5A IMMUNOSUPPRESSION DUE TO CHRONIC STEROID USE: ICD-10-CM

## 2024-07-30 DIAGNOSIS — H34.8120 HEMISPHERIC RETINAL VEIN OCCLUSION WITH MACULAR EDEMA OF LEFT EYE: ICD-10-CM

## 2024-07-30 DIAGNOSIS — G89.29 CHRONIC BILATERAL LOW BACK PAIN WITH LEFT-SIDED SCIATICA: ICD-10-CM

## 2024-07-30 DIAGNOSIS — M85.80 OSTEOPENIA, UNSPECIFIED LOCATION: ICD-10-CM

## 2024-07-30 DIAGNOSIS — M54.42 CHRONIC BILATERAL LOW BACK PAIN WITH LEFT-SIDED SCIATICA: ICD-10-CM

## 2024-07-30 DIAGNOSIS — D84.821 IMMUNOSUPPRESSION DUE TO CHRONIC STEROID USE: ICD-10-CM

## 2024-07-30 DIAGNOSIS — R56.9 SEIZURE: ICD-10-CM

## 2024-07-30 DIAGNOSIS — Z00.00 ENCOUNTER FOR PREVENTIVE HEALTH EXAMINATION: Primary | ICD-10-CM

## 2024-07-30 DIAGNOSIS — N18.31 ANEMIA IN STAGE 3A CHRONIC KIDNEY DISEASE: ICD-10-CM

## 2024-07-30 DIAGNOSIS — N25.81 SECONDARY HYPERPARATHYROIDISM OF RENAL ORIGIN: ICD-10-CM

## 2024-07-30 DIAGNOSIS — N18.31 STAGE 3A CHRONIC KIDNEY DISEASE: ICD-10-CM

## 2024-07-30 DIAGNOSIS — I15.2 HYPERTENSION ASSOCIATED WITH DIABETES: ICD-10-CM

## 2024-07-30 DIAGNOSIS — E11.3292 CONTROLLED TYPE 2 DIABETES MELLITUS WITH LEFT EYE AFFECTED BY MILD NONPROLIFERATIVE RETINOPATHY WITHOUT MACULAR EDEMA, WITHOUT LONG-TERM CURRENT USE OF INSULIN: Chronic | ICD-10-CM

## 2024-07-30 DIAGNOSIS — E78.2 COMBINED HYPERLIPIDEMIA ASSOCIATED WITH TYPE 2 DIABETES MELLITUS: Chronic | ICD-10-CM

## 2024-07-30 PROBLEM — I26.94 MULTIPLE SUBSEGMENTAL PULMONARY EMBOLI WITHOUT ACUTE COR PULMONALE: Status: RESOLVED | Noted: 2021-07-10 | Resolved: 2024-07-30

## 2024-07-30 PROBLEM — J98.4 CALCIFIED GRANULOMA OF LUNG: Status: ACTIVE | Noted: 2024-07-30

## 2024-07-30 PROCEDURE — 1159F MED LIST DOCD IN RCRD: CPT | Mod: CPTII,S$GLB,,

## 2024-07-30 PROCEDURE — 1160F RVW MEDS BY RX/DR IN RCRD: CPT | Mod: CPTII,S$GLB,,

## 2024-07-30 PROCEDURE — 3075F SYST BP GE 130 - 139MM HG: CPT | Mod: CPTII,S$GLB,,

## 2024-07-30 PROCEDURE — 1125F AMNT PAIN NOTED PAIN PRSNT: CPT | Mod: CPTII,S$GLB,,

## 2024-07-30 PROCEDURE — G0439 PPPS, SUBSEQ VISIT: HCPCS | Mod: S$GLB,,,

## 2024-07-30 PROCEDURE — 1170F FXNL STATUS ASSESSED: CPT | Mod: CPTII,S$GLB,,

## 2024-07-30 PROCEDURE — 1101F PT FALLS ASSESS-DOCD LE1/YR: CPT | Mod: CPTII,S$GLB,,

## 2024-07-30 PROCEDURE — 3072F LOW RISK FOR RETINOPATHY: CPT | Mod: CPTII,S$GLB,,

## 2024-07-30 PROCEDURE — 1123F ACP DISCUSS/DSCN MKR DOCD: CPT | Mod: CPTII,S$GLB,,

## 2024-07-30 PROCEDURE — 3288F FALL RISK ASSESSMENT DOCD: CPT | Mod: CPTII,S$GLB,,

## 2024-07-30 PROCEDURE — 3079F DIAST BP 80-89 MM HG: CPT | Mod: CPTII,S$GLB,,

## 2024-07-30 NOTE — PROGRESS NOTES
"Regino Lawrence presented for a follow-up Medicare AWV today. The following components were reviewed and updated:    Medical history  Family History  Social history  Allergies and Current Medications  Health Risk Assessment  Health Maintenance  Care Team    **See Completed Assessments for Annual Wellness visit with in the encounter summary    The following assessments were completed:  Depression Screening  Cognitive function Screening: Declines, states she is seeing Neurology for dementia dx  Timed Get Up Test: Deferred, impaired mobility  Whisper Test      Opioid documentation:      Patient does not have a current opioid prescription.          Vitals:    07/30/24 0954   BP: 132/80   BP Location: Left arm   Patient Position: Sitting   Pulse: 97   Resp: 16   Temp: 97 °F (36.1 °C)   SpO2: 100%   Weight: 62.3 kg (137 lb 5.6 oz)   Height: 5' 2" (1.575 m)     Body mass index is 25.12 kg/m².       Physical Exam  Vitals reviewed.   Constitutional:       General: She is not in acute distress.     Appearance: Normal appearance.   HENT:      Head: Normocephalic.   Cardiovascular:      Rate and Rhythm: Normal rate and regular rhythm.      Pulses: Normal pulses.      Heart sounds: Normal heart sounds.   Pulmonary:      Effort: Pulmonary effort is normal. No respiratory distress.      Breath sounds: Normal breath sounds. No wheezing.   Abdominal:      General: Bowel sounds are normal.      Tenderness: There is no abdominal tenderness.   Musculoskeletal:         General: Normal range of motion.      Cervical back: Normal range of motion.      Right lower leg: No edema.      Left lower leg: No edema.   Skin:     General: Skin is warm and dry.      Capillary Refill: Capillary refill takes less than 2 seconds.   Neurological:      General: No focal deficit present.      Mental Status: She is alert and oriented to person, place, and time.      Gait: Gait abnormal.   Psychiatric:         Mood and Affect: Mood normal.         Behavior: " Behavior normal.           Diagnoses and health risks identified today and associated recommendations/orders:    1. Encounter for preventive health examination  Assessments completed.   recommendations reviewed.  F/u with PCP as instructed.      2. Stage 3a chronic kidney disease  Chronic, stable on current regimen. Followed by PCP.   Lab Results   Component Value Date    CREATININE 1.2 07/26/2024    BUN 25 (H) 07/26/2024     (L) 07/26/2024    K 3.6 07/26/2024    CL 99 07/26/2024    CO2 23 07/26/2024     3. Chronic obstructive pulmonary disease, unspecified COPD type  Chronic, stable on current Advair regimen. Followed by PCP.     4. Anemia in stage 3a chronic kidney disease  Chronic, stable on current regimen. Followed by PCP.   Lab Results   Component Value Date    WBC 4.90 07/26/2024    HGB 10.3 (L) 07/26/2024    HCT 33.0 (L) 07/26/2024     (H) 07/26/2024     07/26/2024     5. Calcified granuloma of lung - CT 2022  Chronic, stable on current regimen. Followed by PCP.     6. Hemispheric retinal vein occlusion with macular edema of left eye  Chronic, stable on current regimen. Followed by Ophthalmology.     7. Aortic atherosclerosis  Chronic, stable on current statin regimen. Followed by PCP.     8. Pulmonary hypertension, unspecified  Chronic, stable on current regimen. Followed by PCP.     9. Seizure  Chronic, stable on current Keppra regimen. Followed by Neurology.     10. Secondary hyperparathyroidism of renal origin  Chronic, stable on current regimen. Followed by PCP.   Lab Results   Component Value Date    PTH 81.7 (H) 02/24/2021    CALCIUM 8.8 07/26/2024    PHOS 2.7 07/10/2024     11. Immunosuppression due to chronic steroid use  Chronic, stable on current Prednisone regimen. Followed by PCP.     12. Sacroiliitis  Chronic, stable on current regimen. Followed by PCP.     13. Controlled type 2 diabetes mellitus with left eye affected by mild nonproliferative retinopathy without macular  edema, without long-term current use of insulin  Chronic, stable on current Lantus regimen. Followed by PCP.   Lab Results   Component Value Date    HGBA1C 5.5 03/01/2024     14. Hypertension associated with diabetes  Chronic, stable on current Coreg, Procardia regimen. Followed by PCP.     15. Combined 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  Chronic, stable on current statin regimen. Followed by PCP.     16. Gastroesophageal reflux disease, unspecified whether esophagitis present  Chronic, stable on current Protonix regimen. Followed by PCP.     17. Osteopenia, unspecified location  Chronic, stable on current regimen. Followed by PCP.     18. Chronic bilateral low back pain with left-sided sciatica  Chronic, stable on current regimen. Followed by PCP.     19. Abnormality of gait and mobility  Unable to safely ambulate without assistance.     20. Dependence on other enabling machines and devices  Uses cane or scooter as needed.       Provided Arletha with a 5-10 year written screening schedule and personal prevention plan. Recommendations were developed using the USPSTF age appropriate recommendations. Education, counseling, and referrals were provided as needed.  After Visit Summary printed and given to patient which includes a list of additional screenings\tests needed.    Follow up in about 1 year (around 7/30/2025) for Medicare AWV.      Ema Guzman NP    I offered to discuss advanced care planning, including how to pick a person who would make decisions for you if you were unable to make them for yourself, called a health care power of , and what kind of decisions you might make such as use of life sustaining treatments such as ventilators and tube feeding when faced with a life limiting illness recorded on a living will that they will need to know. (How you want to be cared for as you near the end of your natural life)     X  Patient has advanced directives on file, which we  reviewed, and they do not wish to make changes.

## 2024-07-30 NOTE — PATIENT INSTRUCTIONS
Counseling and Referral of Other Preventative  (Italic type indicates deductible and co-insurance are waived)    Patient Name: Regino Lawrence  Today's Date: 7/30/2024    Health Maintenance       Date Due Completion Date    RSV Vaccine (Age 60+ and Pregnant patients) (1 - 1-dose 60+ series) Never done ---    Shingles Vaccine (1 of 2) 07/20/2015 5/25/2015    Mammogram 09/14/2022 9/14/2020    Lipid Panel 12/09/2023 12/9/2022    COVID-19 Vaccine (6 - 2023-24 season) 12/29/2023 11/3/2023    Eye Exam 10/25/2024 10/25/2023    Influenza Vaccine (1) 09/01/2024 11/3/2023    Override on 8/8/2018: Done    Override on 9/27/2017: Done    Hemoglobin A1c 09/01/2024 3/1/2024    Diabetes Urine Screening 09/08/2024 9/8/2023    DEXA Scan 11/07/2024 11/7/2022    TETANUS VACCINE 05/16/2026 5/16/2016        No orders of the defined types were placed in this encounter.    The following information is provided to all patients.  This information is to help you find resources for any of the problems found today that may be affecting your health:                  Living healthy guide: www.[facility].[place].gov      Understanding Diabetes: www.diabetes.org      Eating healthy: www.cdc.gov/healthyweight      CDC home safety checklist: www.cdc.gov/steadi/patient.html      Agency on Aging: www.goea.[place].[facility]      Alcoholics anonymous (AA): www.aa.org      Physical Activity: www.himanshu.nih.gov/mk0ccwg      Tobacco use: www.quitwithusla.org

## 2024-07-31 ENCOUNTER — PATIENT MESSAGE (OUTPATIENT)
Dept: FAMILY MEDICINE | Facility: CLINIC | Age: 79
End: 2024-07-31
Payer: MEDICARE

## 2024-07-31 ENCOUNTER — TELEPHONE (OUTPATIENT)
Dept: NEUROLOGY | Facility: CLINIC | Age: 79
End: 2024-07-31
Payer: MEDICARE

## 2024-07-31 NOTE — TELEPHONE ENCOUNTER
"The patient is complaining of side effects from the medication "Pregabalin 75 mg", swelling to legs, sob, and trouble walking. I asked the patient if she stopped the medication due to the side effects she's experiencing, the patient states no, she was told that she could not just discontinue the medication. Patient advised to go to the ER, and I also informed the patient that I would a message over to the provider. The patient states that she is taking  the pregabalin along with the Keppra  ----- Message from Opal Aaron sent at 7/31/2024  9:44 AM CDT -----  .Type: Patient Call Back    Who called:Self     What is the request in detail: Need an appointment. Stated she think she's having side affects from the medicine. Having trouble walking, legs swelling and shortness of breath     Can the clinic reply by [facility]? No     Would the patient rather a call back or a response via [facility]? Call Back     Best call back number: .[phone] (home)       Additional Information:  "

## 2024-07-31 NOTE — TELEPHONE ENCOUNTER
Pt states she spoke  with provider  at appt of 7/10 and now her concerns are even worse she can hardly walk  because of the shooting pain from her hips to lowe back, states pain is constant

## 2024-08-01 NOTE — TELEPHONE ENCOUNTER
She has been advised to go to the ED by her neurologist, and I agree as our tests have not shown a cause of her symptoms    Micaela Mendoza MD

## 2024-08-02 ENCOUNTER — INFUSION (OUTPATIENT)
Dept: INFUSION THERAPY | Facility: HOSPITAL | Age: 79
End: 2024-08-02
Attending: INTERNAL MEDICINE
Payer: MEDICARE

## 2024-08-02 ENCOUNTER — TELEPHONE (OUTPATIENT)
Dept: NEUROLOGY | Facility: CLINIC | Age: 79
End: 2024-08-02
Payer: MEDICARE

## 2024-08-02 VITALS
HEART RATE: 72 BPM | TEMPERATURE: 98 F | OXYGEN SATURATION: 100 % | RESPIRATION RATE: 16 BRPM | SYSTOLIC BLOOD PRESSURE: 142 MMHG | DIASTOLIC BLOOD PRESSURE: 74 MMHG

## 2024-08-02 DIAGNOSIS — D63.1 ANEMIA IN STAGE 3 CHRONIC KIDNEY DISEASE, UNSPECIFIED WHETHER STAGE 3A OR 3B CKD: Primary | ICD-10-CM

## 2024-08-02 DIAGNOSIS — N18.30 ANEMIA IN STAGE 3 CHRONIC KIDNEY DISEASE, UNSPECIFIED WHETHER STAGE 3A OR 3B CKD: Primary | ICD-10-CM

## 2024-08-02 PROCEDURE — 63600175 PHARM REV CODE 636 W HCPCS: Mod: HCNC | Performed by: INTERNAL MEDICINE

## 2024-08-02 PROCEDURE — 96374 THER/PROPH/DIAG INJ IV PUSH: CPT | Mod: HCNC

## 2024-08-02 RX ADMIN — IRON SUCROSE 100 MG: 20 INJECTION, SOLUTION INTRAVENOUS at 11:08

## 2024-08-02 NOTE — PLAN OF CARE
Pt received weekly venofer 100mg dose #6 of 10 ordered. VSS. Patient tolerated IV iron well. Denies noticeable improvement in energy levels or FAUSTIN since beginning iron infusions. No adverse reactions noted during visit. She will return in 1 week for venofer dose 7 and q2w epo injection. Patient discharged from unit via mobile chair, accompanied by daughter. Patient in NAD at time of dc.

## 2024-08-02 NOTE — TELEPHONE ENCOUNTER
"----- Message from Marly Alex MA sent at 7/31/2024 10:43 AM CDT -----  Regarding: side effects from medication "Pregabalin 75 mg"  Good morning Dr. Knapp,  The patient is complaining of side effects from the medication "Pregabalin 75 mg", swelling to legs, sob, and trouble walking. I asked the patient if she stopped the medication due to the side effects she's experiencing, the patient states no, she was told that she could not just discontinue the medication. Patient advised to go to the ER, and I also informed the patient that I would a message over to the provider. The patient states that she is taking  the pregabalin along with the Keppra  ----- Message -----  From: Opal Aaron  Sent: 7/31/2024   9:46 AM CDT  To: [facility] Staff    .Type: Patient Call Back    Who called:Self     What is the request in detail: Need an appointment. Stated she think she's having side affects from the medicine. Having trouble walking, legs swelling and shortness of breath     Can the clinic reply by [facility]? No     Would the patient rather a call back or a response via [facility]? Call Back     Best call back number: .[phone] (home)       Additional Information:  "

## 2024-08-02 NOTE — TELEPHONE ENCOUNTER
Tried calling patient on 08/02/2024 at 12:00 p.m. but was unable to reach out.  She was added on Lyrica in April and has recently reported swelling in the legs, shortness of breath and trouble walking.  Unclear when the symptoms started. Retention of fluid is commonly noticed with Lyrica but again unclear when her symptoms exactly started.  She was advised to go to the ER by the staff for further evaluation of her symptoms.    Pauline Knapp MD

## 2024-08-06 DIAGNOSIS — D86.9 SARCOIDOSIS: ICD-10-CM

## 2024-08-07 RX ORDER — DULOXETIN HYDROCHLORIDE 60 MG/1
60 CAPSULE, DELAYED RELEASE ORAL DAILY
Qty: 90 CAPSULE | Refills: 1 | Status: SHIPPED | OUTPATIENT
Start: 2024-08-07 | End: 2025-08-07

## 2024-08-09 ENCOUNTER — LAB VISIT (OUTPATIENT)
Dept: LAB | Facility: HOSPITAL | Age: 79
End: 2024-08-09
Attending: INTERNAL MEDICINE
Payer: MEDICARE

## 2024-08-09 ENCOUNTER — INFUSION (OUTPATIENT)
Dept: INFUSION THERAPY | Facility: HOSPITAL | Age: 79
End: 2024-08-09
Attending: INTERNAL MEDICINE
Payer: MEDICARE

## 2024-08-09 VITALS
DIASTOLIC BLOOD PRESSURE: 95 MMHG | TEMPERATURE: 98 F | RESPIRATION RATE: 18 BRPM | OXYGEN SATURATION: 99 % | SYSTOLIC BLOOD PRESSURE: 174 MMHG | HEART RATE: 102 BPM

## 2024-08-09 DIAGNOSIS — D63.1 ANEMIA IN CHRONIC KIDNEY DISEASE, UNSPECIFIED CKD STAGE: ICD-10-CM

## 2024-08-09 DIAGNOSIS — D50.9 IRON DEFICIENCY ANEMIA, UNSPECIFIED IRON DEFICIENCY ANEMIA TYPE: ICD-10-CM

## 2024-08-09 DIAGNOSIS — D63.1 ANEMIA IN STAGE 3 CHRONIC KIDNEY DISEASE, UNSPECIFIED WHETHER STAGE 3A OR 3B CKD: Primary | ICD-10-CM

## 2024-08-09 DIAGNOSIS — N18.30 ANEMIA IN STAGE 3 CHRONIC KIDNEY DISEASE, UNSPECIFIED WHETHER STAGE 3A OR 3B CKD: Primary | ICD-10-CM

## 2024-08-09 DIAGNOSIS — N18.9 ANEMIA IN CHRONIC KIDNEY DISEASE, UNSPECIFIED CKD STAGE: ICD-10-CM

## 2024-08-09 LAB
BASOPHILS # BLD AUTO: 0.01 K/UL (ref 0–0.2)
BASOPHILS NFR BLD: 0.2 % (ref 0–1.9)
DIFFERENTIAL METHOD BLD: ABNORMAL
EOSINOPHIL # BLD AUTO: 0.1 K/UL (ref 0–0.5)
EOSINOPHIL NFR BLD: 1 % (ref 0–8)
ERYTHROCYTE [DISTWIDTH] IN BLOOD BY AUTOMATED COUNT: 14 % (ref 11.5–14.5)
HCT VFR BLD AUTO: 35 % (ref 37–48.5)
HGB BLD-MCNC: 11 G/DL (ref 12–16)
IMM GRANULOCYTES # BLD AUTO: 0.05 K/UL (ref 0–0.04)
IMM GRANULOCYTES NFR BLD AUTO: 1 % (ref 0–0.5)
LYMPHOCYTES # BLD AUTO: 1.3 K/UL (ref 1–4.8)
LYMPHOCYTES NFR BLD: 25.6 % (ref 18–48)
MCH RBC QN AUTO: 33.2 PG (ref 27–31)
MCHC RBC AUTO-ENTMCNC: 31.4 G/DL (ref 32–36)
MCV RBC AUTO: 106 FL (ref 82–98)
MONOCYTES # BLD AUTO: 0.5 K/UL (ref 0.3–1)
MONOCYTES NFR BLD: 9.2 % (ref 4–15)
NEUTROPHILS # BLD AUTO: 3.1 K/UL (ref 1.8–7.7)
NEUTROPHILS NFR BLD: 63 % (ref 38–73)
NRBC BLD-RTO: 0 /100 WBC
PLATELET # BLD AUTO: 281 K/UL (ref 150–450)
PMV BLD AUTO: 9.1 FL (ref 9.2–12.9)
RBC # BLD AUTO: 3.31 M/UL (ref 4–5.4)
WBC # BLD AUTO: 4.88 K/UL (ref 3.9–12.7)

## 2024-08-09 PROCEDURE — 36415 COLL VENOUS BLD VENIPUNCTURE: CPT | Mod: HCNC | Performed by: INTERNAL MEDICINE

## 2024-08-09 PROCEDURE — 85025 COMPLETE CBC W/AUTO DIFF WBC: CPT | Mod: HCNC | Performed by: INTERNAL MEDICINE

## 2024-08-09 PROCEDURE — 96374 THER/PROPH/DIAG INJ IV PUSH: CPT | Mod: HCNC

## 2024-08-09 PROCEDURE — 63600175 PHARM REV CODE 636 W HCPCS: Mod: HCNC | Performed by: INTERNAL MEDICINE

## 2024-08-09 RX ADMIN — IRON SUCROSE 100 MG: 20 INJECTION, SOLUTION INTRAVENOUS at 11:08

## 2024-08-16 ENCOUNTER — INFUSION (OUTPATIENT)
Dept: INFUSION THERAPY | Facility: HOSPITAL | Age: 79
End: 2024-08-16
Attending: INTERNAL MEDICINE
Payer: MEDICARE

## 2024-08-16 VITALS
RESPIRATION RATE: 16 BRPM | OXYGEN SATURATION: 98 % | SYSTOLIC BLOOD PRESSURE: 149 MMHG | DIASTOLIC BLOOD PRESSURE: 75 MMHG | HEART RATE: 97 BPM | TEMPERATURE: 98 F

## 2024-08-16 DIAGNOSIS — D63.1 ANEMIA IN STAGE 3 CHRONIC KIDNEY DISEASE, UNSPECIFIED WHETHER STAGE 3A OR 3B CKD: Primary | ICD-10-CM

## 2024-08-16 DIAGNOSIS — N18.30 ANEMIA IN STAGE 3 CHRONIC KIDNEY DISEASE, UNSPECIFIED WHETHER STAGE 3A OR 3B CKD: Primary | ICD-10-CM

## 2024-08-16 PROCEDURE — 63600175 PHARM REV CODE 636 W HCPCS: Mod: HCNC | Performed by: INTERNAL MEDICINE

## 2024-08-16 PROCEDURE — 96374 THER/PROPH/DIAG INJ IV PUSH: CPT | Mod: HCNC

## 2024-08-16 RX ADMIN — IRON SUCROSE 100 MG: 20 INJECTION, SOLUTION INTRAVENOUS at 12:08

## 2024-08-16 NOTE — PLAN OF CARE
Venofer administered and tolerated well. Patient discharged in NAD.      Problem: Anemia  Goal: Anemia Symptom Improvement  Outcome: Progressing

## 2024-08-23 ENCOUNTER — LAB VISIT (OUTPATIENT)
Dept: LAB | Facility: HOSPITAL | Age: 79
End: 2024-08-23
Attending: INTERNAL MEDICINE
Payer: MEDICARE

## 2024-08-23 ENCOUNTER — INFUSION (OUTPATIENT)
Dept: INFUSION THERAPY | Facility: HOSPITAL | Age: 79
End: 2024-08-23
Attending: INTERNAL MEDICINE
Payer: MEDICARE

## 2024-08-23 VITALS
OXYGEN SATURATION: 96 % | HEART RATE: 92 BPM | TEMPERATURE: 98 F | DIASTOLIC BLOOD PRESSURE: 71 MMHG | SYSTOLIC BLOOD PRESSURE: 144 MMHG | RESPIRATION RATE: 16 BRPM

## 2024-08-23 DIAGNOSIS — D63.1 ANEMIA IN CHRONIC KIDNEY DISEASE, UNSPECIFIED CKD STAGE: ICD-10-CM

## 2024-08-23 DIAGNOSIS — D50.9 IRON DEFICIENCY ANEMIA, UNSPECIFIED IRON DEFICIENCY ANEMIA TYPE: ICD-10-CM

## 2024-08-23 DIAGNOSIS — D63.1 ANEMIA IN STAGE 3 CHRONIC KIDNEY DISEASE, UNSPECIFIED WHETHER STAGE 3A OR 3B CKD: Primary | ICD-10-CM

## 2024-08-23 DIAGNOSIS — N18.30 ANEMIA IN STAGE 3 CHRONIC KIDNEY DISEASE, UNSPECIFIED WHETHER STAGE 3A OR 3B CKD: Primary | ICD-10-CM

## 2024-08-23 DIAGNOSIS — N18.9 ANEMIA IN CHRONIC KIDNEY DISEASE, UNSPECIFIED CKD STAGE: ICD-10-CM

## 2024-08-23 LAB
BASOPHILS # BLD AUTO: 0.02 K/UL (ref 0–0.2)
BASOPHILS NFR BLD: 0.4 % (ref 0–1.9)
DIFFERENTIAL METHOD BLD: ABNORMAL
EOSINOPHIL # BLD AUTO: 0.1 K/UL (ref 0–0.5)
EOSINOPHIL NFR BLD: 1.7 % (ref 0–8)
ERYTHROCYTE [DISTWIDTH] IN BLOOD BY AUTOMATED COUNT: 13.2 % (ref 11.5–14.5)
HCT VFR BLD AUTO: 33.3 % (ref 37–48.5)
HGB BLD-MCNC: 11 G/DL (ref 12–16)
IMM GRANULOCYTES # BLD AUTO: 0.09 K/UL (ref 0–0.04)
IMM GRANULOCYTES NFR BLD AUTO: 1.9 % (ref 0–0.5)
LYMPHOCYTES # BLD AUTO: 1.1 K/UL (ref 1–4.8)
LYMPHOCYTES NFR BLD: 22.6 % (ref 18–48)
MCH RBC QN AUTO: 34.3 PG (ref 27–31)
MCHC RBC AUTO-ENTMCNC: 33 G/DL (ref 32–36)
MCV RBC AUTO: 104 FL (ref 82–98)
MONOCYTES # BLD AUTO: 0.5 K/UL (ref 0.3–1)
MONOCYTES NFR BLD: 9.9 % (ref 4–15)
NEUTROPHILS # BLD AUTO: 3 K/UL (ref 1.8–7.7)
NEUTROPHILS NFR BLD: 63.5 % (ref 38–73)
NRBC BLD-RTO: 0 /100 WBC
PLATELET # BLD AUTO: 252 K/UL (ref 150–450)
PMV BLD AUTO: 9 FL (ref 9.2–12.9)
RBC # BLD AUTO: 3.21 M/UL (ref 4–5.4)
WBC # BLD AUTO: 4.73 K/UL (ref 3.9–12.7)

## 2024-08-23 PROCEDURE — 36415 COLL VENOUS BLD VENIPUNCTURE: CPT | Mod: HCNC | Performed by: INTERNAL MEDICINE

## 2024-08-23 PROCEDURE — 96374 THER/PROPH/DIAG INJ IV PUSH: CPT | Mod: HCNC

## 2024-08-23 PROCEDURE — 85025 COMPLETE CBC W/AUTO DIFF WBC: CPT | Mod: HCNC | Performed by: INTERNAL MEDICINE

## 2024-08-23 PROCEDURE — 63600175 PHARM REV CODE 636 W HCPCS: Mod: HCNC | Performed by: INTERNAL MEDICINE

## 2024-08-23 RX ADMIN — IRON SUCROSE 100 MG: 20 INJECTION, SOLUTION INTRAVENOUS at 11:08

## 2024-08-23 NOTE — PLAN OF CARE
Pt arrived to unit in motorized wheelchair, accompanied by family member. Pt had labs pta, hgb 11.0 so held Retacrit. Pt received IV Venofer with no S&S of complications. Pt discharged off unit in [facility].

## 2024-08-30 ENCOUNTER — INFUSION (OUTPATIENT)
Dept: INFUSION THERAPY | Facility: HOSPITAL | Age: 79
End: 2024-08-30
Attending: INTERNAL MEDICINE
Payer: MEDICARE

## 2024-08-30 VITALS
DIASTOLIC BLOOD PRESSURE: 86 MMHG | RESPIRATION RATE: 16 BRPM | HEART RATE: 103 BPM | OXYGEN SATURATION: 99 % | SYSTOLIC BLOOD PRESSURE: 163 MMHG | TEMPERATURE: 98 F

## 2024-08-30 DIAGNOSIS — N18.30 ANEMIA IN STAGE 3 CHRONIC KIDNEY DISEASE, UNSPECIFIED WHETHER STAGE 3A OR 3B CKD: Primary | ICD-10-CM

## 2024-08-30 DIAGNOSIS — D63.1 ANEMIA IN STAGE 3 CHRONIC KIDNEY DISEASE, UNSPECIFIED WHETHER STAGE 3A OR 3B CKD: Primary | ICD-10-CM

## 2024-08-30 PROCEDURE — 63600175 PHARM REV CODE 636 W HCPCS: Mod: HCNC | Performed by: INTERNAL MEDICINE

## 2024-08-30 PROCEDURE — 96374 THER/PROPH/DIAG INJ IV PUSH: CPT | Mod: HCNC

## 2024-08-30 RX ADMIN — IRON SUCROSE 100 MG: 20 INJECTION, SOLUTION INTRAVENOUS at 11:08

## 2024-08-30 NOTE — PLAN OF CARE
POC and education reviewed with patient, all questions and concerns answered, patient verbalized understading.     Patient came to unit with electric wheelchair, nurse helped patient to transfer to chair. Nurse started 24g PIV in L AC, patient tolerated well. Nurse gave 100mg venofer injection, patient tolerated well with no side effects. Nurse removed PIV and helped patient to wheelchair. Patient wheeled to car with family friend. For further assessment please see MAR and Flowsheets.     Problem: Anemia  Goal: Anemia Symptom Improvement  Outcome: Met     Problem: Fatigue  Goal: Improved Activity Tolerance  Outcome: Met     Problem: Fall Injury Risk  Goal: Absence of Fall and Fall-Related Injury  Outcome: Met

## 2024-09-06 ENCOUNTER — INFUSION (OUTPATIENT)
Dept: INFUSION THERAPY | Facility: HOSPITAL | Age: 79
End: 2024-09-06
Attending: INTERNAL MEDICINE
Payer: MEDICARE

## 2024-09-06 ENCOUNTER — PATIENT MESSAGE (OUTPATIENT)
Dept: RHEUMATOLOGY | Facility: CLINIC | Age: 79
End: 2024-09-06
Payer: MEDICARE

## 2024-09-06 ENCOUNTER — LAB VISIT (OUTPATIENT)
Dept: LAB | Facility: HOSPITAL | Age: 79
End: 2024-09-06
Attending: INTERNAL MEDICINE
Payer: MEDICARE

## 2024-09-06 VITALS
TEMPERATURE: 98 F | HEART RATE: 88 BPM | OXYGEN SATURATION: 100 % | DIASTOLIC BLOOD PRESSURE: 65 MMHG | SYSTOLIC BLOOD PRESSURE: 129 MMHG | RESPIRATION RATE: 17 BRPM

## 2024-09-06 DIAGNOSIS — D63.1 ANEMIA IN STAGE 3 CHRONIC KIDNEY DISEASE, UNSPECIFIED WHETHER STAGE 3A OR 3B CKD: ICD-10-CM

## 2024-09-06 DIAGNOSIS — D86.9 SARCOIDOSIS: ICD-10-CM

## 2024-09-06 DIAGNOSIS — D86.86 SARCOID ARTHROPATHY: ICD-10-CM

## 2024-09-06 DIAGNOSIS — D63.1 ANEMIA IN STAGE 3 CHRONIC KIDNEY DISEASE: Primary | ICD-10-CM

## 2024-09-06 DIAGNOSIS — R53.83 FATIGUE, UNSPECIFIED TYPE: ICD-10-CM

## 2024-09-06 DIAGNOSIS — N18.30 ANEMIA IN STAGE 3 CHRONIC KIDNEY DISEASE, UNSPECIFIED WHETHER STAGE 3A OR 3B CKD: ICD-10-CM

## 2024-09-06 DIAGNOSIS — G72.9 MYOPATHY: ICD-10-CM

## 2024-09-06 DIAGNOSIS — D63.1 ANEMIA IN CHRONIC KIDNEY DISEASE, UNSPECIFIED CKD STAGE: ICD-10-CM

## 2024-09-06 DIAGNOSIS — N18.9 ANEMIA IN CHRONIC KIDNEY DISEASE, UNSPECIFIED CKD STAGE: ICD-10-CM

## 2024-09-06 DIAGNOSIS — D50.9 IRON DEFICIENCY ANEMIA, UNSPECIFIED IRON DEFICIENCY ANEMIA TYPE: ICD-10-CM

## 2024-09-06 DIAGNOSIS — N18.30 ANEMIA IN STAGE 3 CHRONIC KIDNEY DISEASE: Primary | ICD-10-CM

## 2024-09-06 DIAGNOSIS — Z53.1 REFUSAL OF BLOOD TRANSFUSIONS AS PATIENT IS JEHOVAH'S WITNESS: ICD-10-CM

## 2024-09-06 LAB
ALBUMIN SERPL BCP-MCNC: 3.5 G/DL (ref 3.5–5.2)
ALP SERPL-CCNC: 92 U/L (ref 55–135)
ALT SERPL W/O P-5'-P-CCNC: 24 U/L (ref 10–44)
ANION GAP SERPL CALC-SCNC: 6 MMOL/L (ref 8–16)
AST SERPL-CCNC: 21 U/L (ref 10–40)
BASOPHILS # BLD AUTO: 0.02 K/UL (ref 0–0.2)
BASOPHILS NFR BLD: 0.4 % (ref 0–1.9)
BILIRUB SERPL-MCNC: 0.4 MG/DL (ref 0.1–1)
BUN SERPL-MCNC: 19 MG/DL (ref 8–23)
CALCIUM SERPL-MCNC: 9 MG/DL (ref 8.7–10.5)
CHLORIDE SERPL-SCNC: 98 MMOL/L (ref 95–110)
CK SERPL-CCNC: 308 U/L (ref 20–180)
CO2 SERPL-SCNC: 28 MMOL/L (ref 23–29)
CREAT SERPL-MCNC: 1.2 MG/DL (ref 0.5–1.4)
CRP SERPL-MCNC: 13.8 MG/L (ref 0–8.2)
DIFFERENTIAL METHOD BLD: ABNORMAL
EOSINOPHIL # BLD AUTO: 0.1 K/UL (ref 0–0.5)
EOSINOPHIL NFR BLD: 1.1 % (ref 0–8)
ERYTHROCYTE [DISTWIDTH] IN BLOOD BY AUTOMATED COUNT: 13.1 % (ref 11.5–14.5)
ERYTHROCYTE [SEDIMENTATION RATE] IN BLOOD BY PHOTOMETRIC METHOD: 18 MM/HR (ref 0–36)
EST. GFR  (NO RACE VARIABLE): 46 ML/MIN/1.73 M^2
FERRITIN SERPL-MCNC: 944 NG/ML (ref 20–300)
GLUCOSE SERPL-MCNC: 163 MG/DL (ref 70–110)
HCT VFR BLD AUTO: 32.9 % (ref 37–48.5)
HGB BLD-MCNC: 10.8 G/DL (ref 12–16)
IMM GRANULOCYTES # BLD AUTO: 0.08 K/UL (ref 0–0.04)
IMM GRANULOCYTES NFR BLD AUTO: 1.5 % (ref 0–0.5)
IRON SERPL-MCNC: 110 UG/DL (ref 30–160)
LYMPHOCYTES # BLD AUTO: 1.2 K/UL (ref 1–4.8)
LYMPHOCYTES NFR BLD: 22.6 % (ref 18–48)
MCH RBC QN AUTO: 33.6 PG (ref 27–31)
MCHC RBC AUTO-ENTMCNC: 32.8 G/DL (ref 32–36)
MCV RBC AUTO: 103 FL (ref 82–98)
MONOCYTES # BLD AUTO: 0.6 K/UL (ref 0.3–1)
MONOCYTES NFR BLD: 10.4 % (ref 4–15)
NEUTROPHILS # BLD AUTO: 3.5 K/UL (ref 1.8–7.7)
NEUTROPHILS NFR BLD: 64 % (ref 38–73)
NRBC BLD-RTO: 0 /100 WBC
PLATELET # BLD AUTO: 263 K/UL (ref 150–450)
PMV BLD AUTO: 9.1 FL (ref 9.2–12.9)
POTASSIUM SERPL-SCNC: 3.6 MMOL/L (ref 3.5–5.1)
PROT SERPL-MCNC: 6.5 G/DL (ref 6–8.4)
RBC # BLD AUTO: 3.21 M/UL (ref 4–5.4)
SATURATED IRON: 35 % (ref 20–50)
SODIUM SERPL-SCNC: 132 MMOL/L (ref 136–145)
TOTAL IRON BINDING CAPACITY: 318 UG/DL (ref 250–450)
TRANSFERRIN SERPL-MCNC: 215 MG/DL (ref 200–375)
WBC # BLD AUTO: 5.48 K/UL (ref 3.9–12.7)

## 2024-09-06 PROCEDURE — 80053 COMPREHEN METABOLIC PANEL: CPT | Mod: HCNC | Performed by: INTERNAL MEDICINE

## 2024-09-06 PROCEDURE — 96372 THER/PROPH/DIAG INJ SC/IM: CPT | Mod: HCNC

## 2024-09-06 PROCEDURE — 85652 RBC SED RATE AUTOMATED: CPT | Mod: HCNC | Performed by: INTERNAL MEDICINE

## 2024-09-06 PROCEDURE — 83540 ASSAY OF IRON: CPT | Mod: HCNC | Performed by: INTERNAL MEDICINE

## 2024-09-06 PROCEDURE — 86140 C-REACTIVE PROTEIN: CPT | Mod: HCNC | Performed by: INTERNAL MEDICINE

## 2024-09-06 PROCEDURE — 36415 COLL VENOUS BLD VENIPUNCTURE: CPT | Mod: HCNC | Performed by: INTERNAL MEDICINE

## 2024-09-06 PROCEDURE — 82728 ASSAY OF FERRITIN: CPT | Mod: HCNC | Performed by: INTERNAL MEDICINE

## 2024-09-06 PROCEDURE — 82085 ASSAY OF ALDOLASE: CPT | Mod: HCNC | Performed by: INTERNAL MEDICINE

## 2024-09-06 PROCEDURE — 82550 ASSAY OF CK (CPK): CPT | Mod: HCNC | Performed by: INTERNAL MEDICINE

## 2024-09-06 PROCEDURE — 63600175 PHARM REV CODE 636 W HCPCS: Mod: JZ,EC,JG,HCNC | Performed by: INTERNAL MEDICINE

## 2024-09-06 PROCEDURE — 85025 COMPLETE CBC W/AUTO DIFF WBC: CPT | Mod: HCNC | Performed by: INTERNAL MEDICINE

## 2024-09-06 RX ADMIN — EPOETIN ALFA-EPBX 40000 UNITS: 40000 INJECTION, SOLUTION INTRAVENOUS; SUBCUTANEOUS at 12:09

## 2024-09-06 NOTE — TELEPHONE ENCOUNTER
Staff to inform patient that labs show low sodium and decreased kidney function.  I have informed her primary and she should follow with them.

## 2024-09-06 NOTE — TELEPHONE ENCOUNTER
Labs show low sodium and a decrease in kidney function based GFR.  Will inform the primary doctor.

## 2024-09-09 LAB — ALDOLASE SERPL-CCNC: 4.4 U/L (ref 1.2–7.6)

## 2024-09-16 ENCOUNTER — OFFICE VISIT (OUTPATIENT)
Dept: URGENT CARE | Facility: CLINIC | Age: 79
End: 2024-09-16
Payer: MEDICARE

## 2024-09-16 VITALS
OXYGEN SATURATION: 97 % | BODY MASS INDEX: 25.21 KG/M2 | HEIGHT: 62 IN | SYSTOLIC BLOOD PRESSURE: 136 MMHG | WEIGHT: 137 LBS | DIASTOLIC BLOOD PRESSURE: 88 MMHG | TEMPERATURE: 98 F | HEART RATE: 62 BPM | RESPIRATION RATE: 16 BRPM

## 2024-09-16 DIAGNOSIS — E11.69 COMBINED HYPERLIPIDEMIA ASSOCIATED WITH TYPE 2 DIABETES MELLITUS: Chronic | ICD-10-CM

## 2024-09-16 DIAGNOSIS — E78.2 COMBINED HYPERLIPIDEMIA ASSOCIATED WITH TYPE 2 DIABETES MELLITUS: Chronic | ICD-10-CM

## 2024-09-16 DIAGNOSIS — J06.9 URI, ACUTE: Primary | ICD-10-CM

## 2024-09-16 LAB
CTP QC/QA: YES
SARS-COV-2 AG RESP QL IA.RAPID: NEGATIVE

## 2024-09-16 PROCEDURE — 99213 OFFICE O/P EST LOW 20 MIN: CPT | Mod: S$GLB,,, | Performed by: FAMILY MEDICINE

## 2024-09-16 PROCEDURE — 87811 SARS-COV-2 COVID19 W/OPTIC: CPT | Mod: QW,S$GLB,, | Performed by: FAMILY MEDICINE

## 2024-09-16 RX ORDER — DOXYCYCLINE 100 MG/1
100 CAPSULE ORAL 2 TIMES DAILY
Qty: 14 CAPSULE | Refills: 0 | Status: SHIPPED | OUTPATIENT
Start: 2024-09-16 | End: 2024-09-23

## 2024-09-16 RX ORDER — PROMETHAZINE HYDROCHLORIDE AND DEXTROMETHORPHAN HYDROBROMIDE 6.25; 15 MG/5ML; MG/5ML
SYRUP ORAL
Qty: 180 ML | Refills: 0 | Status: SHIPPED | OUTPATIENT
Start: 2024-09-16

## 2024-09-16 RX ORDER — CETIRIZINE HYDROCHLORIDE 10 MG/1
10 TABLET ORAL DAILY
Qty: 30 TABLET | Refills: 3 | Status: SHIPPED | OUTPATIENT
Start: 2024-09-16 | End: 2025-09-16

## 2024-09-16 NOTE — PROGRESS NOTES
"Subjective:      Patient ID: Regino Lawrence is a 78 y.o. female.    Vitals:  height is 5' 2" (1.575 m) and weight is 62.1 kg (137 lb). Her oral temperature is 98.3 °F (36.8 °C). Her blood pressure is 136/88 and her pulse is 62. Her respiration is 16 and oxygen saturation is 97%.     Chief Complaint: Cough    Patient complains of cough. Symptoms started on last Tuesday (9/10).    Cough  This is a new problem. The current episode started in the past 7 days. The problem has been unchanged. The cough is Non-productive. Associated symptoms include shortness of breath. She has tried OTC cough suppressant for the symptoms.       Respiratory:  Positive for cough and shortness of breath.       Objective:     Physical Exam    Assessment:     1. URI, acute        Plan:       URI, acute  -     SARS Coronavirus 2 Antigen, POCT Manual Read    Other orders  -     cetirizine (ZYRTEC) 10 MG tablet; Take 1 tablet (10 mg total) by mouth once daily.  Dispense: 30 tablet; Refill: 3  -     promethazine-dextromethorphan (PROMETHAZINE-DM) 6.25-15 mg/5 mL Syrp; Take one tsp po q 6 hrs prn cough  Dispense: 180 mL; Refill: 0  -     doxycycline (VIBRAMYCIN) 100 MG Cap; Take 1 capsule (100 mg total) by mouth 2 (two) times daily. for 7 days  Dispense: 14 capsule; Refill: 0      Results for orders placed or performed in visit on 09/16/24   SARS Coronavirus 2 Antigen, POCT Manual Read   Result Value Ref Range    SARS Coronavirus 2 Antigen Negative Negative     Acceptable Yes      *Note: Due to a large number of results and/or encounters for the requested time period, some results have not been displayed. A complete set of results can be found in Results Review.          Since pt is 77 yo  female with hx of DM and on Eliquis will symptomatically treat her with Doxy for a week           "

## 2024-09-16 NOTE — TELEPHONE ENCOUNTER
Care Due:                  Date            Visit Type   Department     Provider  --------------------------------------------------------------------------------                                [facility]                              PRIMARY      MEDICINE/  Last Visit: 07-      CARE ([facility])   INTERNAL LOUIS Mendoza                              [facility]      MEDICINE/  Next Visit: 10-      CARE ([facility])   BENEDICT Sanz  Test          Frequency    Reason                     Performed    Due Date  --------------------------------------------------------------------------------    HBA1C.......  6 months...  insulin..................  03- 08-    Lipid Panel.  12 months..  atorvastatin, fenofibrate  12- 12-    TSH.........  12 months..  levothyroxine............  Not Found    Overdue    [employer] Embedded Care Due Messages. Reference number: [id number].   9/16/2024 3:49:22 PM CDT

## 2024-09-17 ENCOUNTER — OFFICE VISIT (OUTPATIENT)
Dept: HEMATOLOGY/ONCOLOGY | Facility: CLINIC | Age: 79
End: 2024-09-17
Payer: MEDICARE

## 2024-09-17 VITALS
HEIGHT: 62 IN | DIASTOLIC BLOOD PRESSURE: 85 MMHG | OXYGEN SATURATION: 97 % | HEART RATE: 106 BPM | SYSTOLIC BLOOD PRESSURE: 149 MMHG | WEIGHT: 157.44 LBS | BODY MASS INDEX: 28.97 KG/M2

## 2024-09-17 DIAGNOSIS — D50.9 IRON DEFICIENCY ANEMIA, UNSPECIFIED IRON DEFICIENCY ANEMIA TYPE: ICD-10-CM

## 2024-09-17 DIAGNOSIS — R06.02 SOB (SHORTNESS OF BREATH) ON EXERTION: ICD-10-CM

## 2024-09-17 DIAGNOSIS — N18.9 CHRONIC KIDNEY DISEASE, UNSPECIFIED CKD STAGE: ICD-10-CM

## 2024-09-17 DIAGNOSIS — Z53.1 REFUSAL OF BLOOD TRANSFUSIONS AS PATIENT IS JEHOVAH'S WITNESS: ICD-10-CM

## 2024-09-17 DIAGNOSIS — R00.0 TACHYCARDIA: ICD-10-CM

## 2024-09-17 DIAGNOSIS — D86.9 SARCOIDOSIS: ICD-10-CM

## 2024-09-17 DIAGNOSIS — Z86.711 HISTORY OF PULMONARY EMBOLISM: ICD-10-CM

## 2024-09-17 DIAGNOSIS — N18.9 ANEMIA IN CHRONIC KIDNEY DISEASE, UNSPECIFIED CKD STAGE: Primary | ICD-10-CM

## 2024-09-17 DIAGNOSIS — D63.1 ANEMIA IN CHRONIC KIDNEY DISEASE, UNSPECIFIED CKD STAGE: Primary | ICD-10-CM

## 2024-09-17 PROCEDURE — 3079F DIAST BP 80-89 MM HG: CPT | Mod: HCNC,CPTII,S$GLB, | Performed by: INTERNAL MEDICINE

## 2024-09-17 PROCEDURE — 99999 PR PBB SHADOW E&M-EST. PATIENT-LVL III: CPT | Mod: PBBFAC,HCNC,, | Performed by: INTERNAL MEDICINE

## 2024-09-17 PROCEDURE — 1101F PT FALLS ASSESS-DOCD LE1/YR: CPT | Mod: HCNC,CPTII,S$GLB, | Performed by: INTERNAL MEDICINE

## 2024-09-17 PROCEDURE — 3072F LOW RISK FOR RETINOPATHY: CPT | Mod: HCNC,CPTII,S$GLB, | Performed by: INTERNAL MEDICINE

## 2024-09-17 PROCEDURE — 99215 OFFICE O/P EST HI 40 MIN: CPT | Mod: HCNC,S$GLB,, | Performed by: INTERNAL MEDICINE

## 2024-09-17 PROCEDURE — 3077F SYST BP >= 140 MM HG: CPT | Mod: HCNC,CPTII,S$GLB, | Performed by: INTERNAL MEDICINE

## 2024-09-17 PROCEDURE — 1126F AMNT PAIN NOTED NONE PRSNT: CPT | Mod: HCNC,CPTII,S$GLB, | Performed by: INTERNAL MEDICINE

## 2024-09-17 PROCEDURE — 1159F MED LIST DOCD IN RCRD: CPT | Mod: HCNC,CPTII,S$GLB, | Performed by: INTERNAL MEDICINE

## 2024-09-17 PROCEDURE — 1157F ADVNC CARE PLAN IN RCRD: CPT | Mod: HCNC,CPTII,S$GLB, | Performed by: INTERNAL MEDICINE

## 2024-09-17 PROCEDURE — 3288F FALL RISK ASSESSMENT DOCD: CPT | Mod: HCNC,CPTII,S$GLB, | Performed by: INTERNAL MEDICINE

## 2024-09-17 RX ORDER — FENOFIBRATE 160 MG/1
160 TABLET ORAL
Qty: 90 TABLET | Refills: 1 | Status: SHIPPED | OUTPATIENT
Start: 2024-09-17

## 2024-09-17 NOTE — PROGRESS NOTES
Subjective:        Patient ID: Regino Lawrence is a 78 y.o. female.    Chief Complaint: Follow-up anemia      Diagnosis: Anemia in CKD  Patient is a Gnosticist  .  Prior Hx;  The patient is seen today for f/u  chronic anemia in CKD undergoing EPO injections.The patient reports that she has been diagnosed with JOLLY in the past.  She has been on oral iron supplementation therapy, but could not tolerate or did not respond, she is uncertainShe also has  undergone intermittent IV iron therapy.  She is followed by GI and has undergone a colonoscopy earlier this year and was diagnosed with hemorrhoids for which she underwent banding procedure.  No melena, hematochezia,change in bowel habits.  She has also been diagnosed with B12 deficiency in the past, but reports she did not respond to B12 injections. No history of blood transfusions.  She is a Gnosticist.  She reports that she remembers getting injections in the 1970s when her blood count was low.   She is followed by Rheumatology for history of sarcoidosis with associated myopathy and arthropathy. She has been treated in the  past with methotrexate and Plaquenil, both of which were ineffectiveCellcept and imuran caused some unknown side effect. She is followed by PCP for DMShe was admitted 6/2022 with worsening SOB. She also has history of cervical spinal stenosis s/p posterior C3-C7 laminectomy and fusion on 11/16/15 by Dr. Conner.  Diagnosed with sarcoid at [facility] in [facility]. Previously on Plaquenil and methotrexate but both discontinued due to adverse effects.  Leflunomide was also attempted but held due to elevated blood pressure. Currently on prednisone 3 mg daily.       Interval Hx; [facility]  She visited urgent care yesterday for cough and diagnosed with URI and prescribed Doxy   Cough improved   Continues with chronic mild FAUSTIN  Reports some leg swelling   She is in process of establishing care with new Nephrologist 2/2 worsening  "renal fxn    She is undergoing epo inj q 2wks  She also has  undergone intermittent IV iron therapy  Hb 11.2 g/dL on 6/3/24  Hb 10.8g/dL on 24   She is in process of establishing care with new Nephrologist 2/2 worsening renal fxn    She was started on Lyrica 75 mg bid per Neurology  She continues with Chronic diffuse arthralgias   Pain better controlled with prescribed medication  She is no longer followed by pain mgmt  Keppra increased to 1g bid  Last seizure 3 mos     She is f/b Rheumatology for sarcoidosis      PAST MEDICAL HISTORY:  Acid reflux, alopecia, anemia, anxiety disorder, chronic  kidney disease, depression, diabetes mellitus type 2, hyperlipidemia,  hypertension, hypothyroidism, osteoporosis, sarcoidosis.    PAST SURGICAL HISTORY:  Cholecystectomy, , tubal ligation, carpal tunnel release, cataracts.    FAMILY HISTORY: Unremarkable for cancer. Significant for HTN.       Review of Systems   Constitutional:  Positive for fatigue (chronic, mild). Negative for activity change and appetite change.   HENT:  Negative for hearing loss and nosebleeds.    Eyes:  Negative for visual disturbance.   Respiratory:  Positive for cough and shortness of breath.    Cardiovascular:  Negative for chest pain and leg swelling.   Gastrointestinal:  Positive for constipation. Negative for abdominal pain, diarrhea and nausea.   Genitourinary:  Negative for flank pain and urgency.   Musculoskeletal:  Positive for arthralgias, back pain, joint swelling and myalgias. Negative for gait problem.   Skin:  Negative for rash.        No petechiae, ecchymoses   Neurological:  Negative for weakness, light-headedness and headaches.   Hematological:  Negative for adenopathy. Does not bruise/bleed easily.       Objective:         Vitals:    24 1315 24 1351   BP: (!) 149/85    BP Location: Left arm    Patient Position: Sitting    Pulse: (!) 121 106   SpO2: (!) 94% 97%   Weight: 71.4 kg (157 lb 6.5 oz)    Height: 5' 2" " (1.575 m)              .Physical Exam   Constitutional: She is oriented to person, place, and time. She appears well-developed and well-nourished in motorized scooter  HENT:   Head: Normocephalic.   Eyes: Conjunctivae and lids are normal.No scleral icterus.   Neck: Normal range of motion. Neck supple.  Cardiovascular: tachycardic   No murmur heard.  Pulmonary/Chest: Breath sounds normal. She has no wheezes. She has no rales.   Abdominal: Soft. Bowel sounds are normal. There is no tenderness. There is no rebound and no guarding.   Musculoskeletal: Ambulates w/assistance of motorized scooter  Neurological: She is alert and oriented to person, place, and time. No cranial nerve deficit.  Skin: Skin is warm and dry. No ecchymosis, no petechiae and no rash noted. No erythema.   Psychiatric: She has a normal mood and affect.               Lab Results   Component Value Date    WBC 5.48 09/06/2024    HGB 10.8 (L) 09/06/2024    HCT 32.9 (L) 09/06/2024     (H) 09/06/2024     09/06/2024     Lab Results   Component Value Date    IRON 110 09/06/2024    TIBC 318 09/06/2024    FERRITIN 944 (H) 09/06/2024     SPEP-nl      CT renal 3/6/2017   IMPRESSION:  1.  No renal, ureteral or bladder calculi.  No hydronephrosis or ureterectasis.  2.  Poorly distended bladder.  Mild bladder wall prominence.  Mild cystitis cannot be   excluded.  3.  Moderate constipation.  Normal appendix      Lab Results   Component Value Date    IRON 110 09/06/2024    TIBC 318 09/06/2024    FERRITIN 944 (H) 09/06/2024     Lab Results   Component Value Date    WBC 5.48 09/06/2024    HGB 10.8 (L) 09/06/2024    HCT 32.9 (L) 09/06/2024     (H) 09/06/2024     09/06/2024         Assessment:       1. Anemia in chronic kidney disease, unspecified CKD stage    2. Iron deficiency anemia, unspecified iron deficiency anemia type    3. Refusal of blood transfusions as patient is Pentecostal    4. Chronic kidney disease, unspecified CKD stage     5. SOB (shortness of breath) on exertion    6. Tachycardia    7. History of pulmonary embolism                Plan:   1.2..3.    Pt is a Islam and declines/not interested in blood and blood products due to Taoism beliefs  She is undergoing epo inj q 2wks  Pt followed by Nephrology . It has been determined early CKD stage III, suspect due to age-related renal nephron loss, along with possible diabetic nephropathy v. hypertensive nephrosclerosis  CBC q2wks STANDING  Cont  EPO dosage  inj q 2wks( pending lab parameters)  Continue periodic monitoring of Fe studies  Hb 11.2 g/dL on 6/3/24  Hb 10.8g/dL on 9/16/24   Fe studies reviewed    According to KIDIGO guidelines patients with CKD , a  gfr , <60 cc/min and anemia, iron therapy is appropriate if the Tsat is < 30 and ferritin < 500 mg/dl.    Cont KRISTINA q 2wks ( pending lab parameters)  Repeat CBC and Fe studies in 2mos  4 stable  Last Cr level 1.2 mg/dL on 9/16/24   In process of establishing care with new Nephrologist      5. Stable  Plan 2-d echo  ( Overdue )       6. Plan ECG      7. F/b Rheumatology  Manifested by myopathy and arthropathy.  Persistent joint pain and myalgias . Unable to tolerate immunosuppressants/steroid sparing agents  She remains on chronic  steroids 3mg     8. seen on 2021 CTA chest.  RML and RLL.  Currently on Eliquis.  Endorses strict adherence with chronic anticoagulation  It was determined appears  unprovoked.  Continue apixaban indefinitely         REPEAT PULSE AND POX  ECG this week  2- d echo prior to f/u   CBC q2wks STANDING placed x 10  Cont EPO  inj q 2wks( pending lab parameters)  F/u in 3mos with cbc, TIBC and Ferritin studies prior to f/u -standing orders x 6    I spent a total of 40  minutes on the day of the visit.This includes face to face time and non-face to face time preparing to see the patient (eg, review of tests), obtaining and/or reviewing separately obtained history, documenting clinical information in  the electronic or other health record, independently interpreting results and communicating results to the patient/family/caregiver, and coordinating care.      Advance Care Planning     Power of   I previously initiated the process of advance care planning today and explained the importance of this process to the patient.  I introduced the concept of advance directives to the patient, as well. Then the patient received detailed information about the importance of designating a Health Care Power of  (HCPOA). She was also instructed to communicate with this person about their wishes for future healthcare, should she become sick and lose decision-making capacity. The patient has  previously appointed a HCPOA. Pt completed in 2015           CC: Micaela Mendoza M.D.

## 2024-09-17 NOTE — Clinical Note
REPEAT PULSE AND POX ECG this week 2- d echo prior to f/u  CBC q2wks STANDING placed x 10 Cont EPO  inj q 2wks( pending lab parameters) F/u in 3mos with cbc, TIBC and Ferritin studies prior to f/u -standing orders x 6

## 2024-09-19 ENCOUNTER — HOSPITAL ENCOUNTER (OUTPATIENT)
Dept: CARDIOLOGY | Facility: HOSPITAL | Age: 79
Discharge: HOME | End: 2024-09-19
Attending: INTERNAL MEDICINE
Payer: MEDICARE

## 2024-09-19 ENCOUNTER — TELEPHONE (OUTPATIENT)
Dept: INFUSION THERAPY | Facility: HOSPITAL | Age: 79
End: 2024-09-19
Payer: MEDICARE

## 2024-09-19 VITALS — HEIGHT: 62 IN | BODY MASS INDEX: 28.79 KG/M2

## 2024-09-19 DIAGNOSIS — R06.02 SOB (SHORTNESS OF BREATH) ON EXERTION: ICD-10-CM

## 2024-09-19 LAB
ASCENDING AORTA: 2.96 CM
AV INDEX (PROSTH): 0.61
AV MEAN GRADIENT: 5 MMHG
AV PEAK GRADIENT: 10 MMHG
AV VALVE AREA BY VELOCITY RATIO: 2.44 CM²
AV VALVE AREA: 2.25 CM²
AV VELOCITY RATIO: 0.66
CV ECHO LV RWT: 0.64 CM
DOP CALC AO PEAK VEL: 1.59 M/S
DOP CALC AO VTI: 31.4 CM
DOP CALC LVOT AREA: 3.7 CM2
DOP CALC LVOT DIAMETER: 2.17 CM
DOP CALC LVOT PEAK VEL: 1.05 M/S
DOP CALC LVOT STROKE VOLUME: 70.6 CM3
DOP CALC MV VTI: 19.6 CM
DOP CALCLVOT PEAK VEL VTI: 19.1 CM
E WAVE DECELERATION TIME: 124.36 MSEC
E/A RATIO: 0.7
E/E' RATIO: 12 M/S
ECHO LV POSTERIOR WALL: 1.17 CM (ref 0.6–1.1)
FRACTIONAL SHORTENING: 52 % (ref 28–44)
INTERVENTRICULAR SEPTUM: 1.08 CM (ref 0.6–1.1)
IVC DIAMETER: 1.47 CM
LA MAJOR: 4.45 CM
LA MINOR: 4.88 CM
LA WIDTH: 4.4 CM
LEFT ATRIUM SIZE: 2.86 CM
LEFT ATRIUM VOLUME: 49.79 CM3
LEFT INTERNAL DIMENSION IN SYSTOLE: 1.76 CM (ref 2.1–4)
LEFT VENTRICLE DIASTOLIC VOLUME: 57.05 ML
LEFT VENTRICLE SYSTOLIC VOLUME: 9.23 ML
LEFT VENTRICULAR INTERNAL DIMENSION IN DIASTOLE: 3.67 CM (ref 3.5–6)
LEFT VENTRICULAR MASS: 132.09 G
LV LATERAL E/E' RATIO: 9.33 M/S
LV SEPTAL E/E' RATIO: 16.8 M/S
LVED V (TEICH): 57.05 ML
LVES V (TEICH): 9.23 ML
LVOT MG: 2.49 MMHG
LVOT MV: 0.76 CM/S
MV MEAN GRADIENT: 2 MMHG
MV PEAK A VEL: 1.2 M/S
MV PEAK E VEL: 0.84 M/S
MV PEAK GRADIENT: 5 MMHG
MV STENOSIS PRESSURE HALF TIME: 48.13 MS
MV VALVE AREA BY CONTINUITY EQUATION: 3.6 CM2
MV VALVE AREA P 1/2 METHOD: 4.57 CM2
OHS CV RV/LV RATIO: 0.94 CM
OHS QRS DURATION: 74 MS
OHS QTC CALCULATION: 453 MS
PISA TR MAX VEL: 3.35 M/S
PULM VEIN S/D RATIO: 1.67
PV PEAK D VEL: 0.39 M/S
PV PEAK GRADIENT: 4 MMHG
PV PEAK S VEL: 0.65 M/S
PV PEAK VELOCITY: 1.04 M/S
RA MAJOR: 4.35 CM
RA PRESSURE ESTIMATED: 8 MMHG
RA WIDTH: 4.2 CM
RIGHT VENTRICLE DIASTOLIC BASEL DIMENSION: 3.5 CM
RIGHT VENTRICULAR END-DIASTOLIC DIMENSION: 3.46 CM
RV TB RVSP: 11 MMHG
RV TISSUE DOPPLER FREE WALL SYSTOLIC VELOCITY 1 (APICAL 4 CHAMBER VIEW): 14.64 CM/S
SINUS: 2.9 CM
STJ: 2.37 CM
TDI LATERAL: 0.09 M/S
TDI SEPTAL: 0.05 M/S
TDI: 0.07 M/S
TR MAX PG: 45 MMHG
TRICUSPID ANNULAR PLANE SYSTOLIC EXCURSION: 2.38 CM
TV REST PULMONARY ARTERY PRESSURE: 53 MMHG

## 2024-09-19 PROCEDURE — 93005 ELECTROCARDIOGRAM TRACING: CPT | Mod: HCNC

## 2024-09-19 PROCEDURE — 93306 TTE W/DOPPLER COMPLETE: CPT | Mod: HCNC

## 2024-09-19 PROCEDURE — 93306 TTE W/DOPPLER COMPLETE: CPT | Mod: 26,HCNC,, | Performed by: INTERNAL MEDICINE

## 2024-09-19 PROCEDURE — 93010 ELECTROCARDIOGRAM REPORT: CPT | Mod: HCNC,,, | Performed by: INTERNAL MEDICINE

## 2024-09-20 ENCOUNTER — LAB VISIT (OUTPATIENT)
Dept: LAB | Facility: HOSPITAL | Age: 79
End: 2024-09-20
Attending: INTERNAL MEDICINE
Payer: MEDICARE

## 2024-09-20 DIAGNOSIS — D63.1 ANEMIA IN CHRONIC KIDNEY DISEASE, UNSPECIFIED CKD STAGE: ICD-10-CM

## 2024-09-20 DIAGNOSIS — N18.9 ANEMIA IN CHRONIC KIDNEY DISEASE, UNSPECIFIED CKD STAGE: ICD-10-CM

## 2024-09-20 LAB
BASOPHILS # BLD AUTO: 0.03 K/UL (ref 0–0.2)
BASOPHILS NFR BLD: 0.6 % (ref 0–1.9)
DIFFERENTIAL METHOD BLD: ABNORMAL
EOSINOPHIL # BLD AUTO: 0.1 K/UL (ref 0–0.5)
EOSINOPHIL NFR BLD: 2.7 % (ref 0–8)
ERYTHROCYTE [DISTWIDTH] IN BLOOD BY AUTOMATED COUNT: 12.9 % (ref 11.5–14.5)
HCT VFR BLD AUTO: 33.1 % (ref 37–48.5)
HGB BLD-MCNC: 11.3 G/DL (ref 12–16)
IMM GRANULOCYTES # BLD AUTO: 0.09 K/UL (ref 0–0.04)
IMM GRANULOCYTES NFR BLD AUTO: 1.7 % (ref 0–0.5)
LYMPHOCYTES # BLD AUTO: 1.3 K/UL (ref 1–4.8)
LYMPHOCYTES NFR BLD: 25.2 % (ref 18–48)
MCH RBC QN AUTO: 35.1 PG (ref 27–31)
MCHC RBC AUTO-ENTMCNC: 34.1 G/DL (ref 32–36)
MCV RBC AUTO: 103 FL (ref 82–98)
MONOCYTES # BLD AUTO: 0.6 K/UL (ref 0.3–1)
MONOCYTES NFR BLD: 11.5 % (ref 4–15)
NEUTROPHILS # BLD AUTO: 3.1 K/UL (ref 1.8–7.7)
NEUTROPHILS NFR BLD: 58.3 % (ref 38–73)
NRBC BLD-RTO: 0 /100 WBC
PLATELET # BLD AUTO: 285 K/UL (ref 150–450)
PMV BLD AUTO: 9 FL (ref 9.2–12.9)
RBC # BLD AUTO: 3.22 M/UL (ref 4–5.4)
WBC # BLD AUTO: 5.23 K/UL (ref 3.9–12.7)

## 2024-09-20 PROCEDURE — 36415 COLL VENOUS BLD VENIPUNCTURE: CPT | Mod: HCNC | Performed by: INTERNAL MEDICINE

## 2024-09-20 PROCEDURE — 85025 COMPLETE CBC W/AUTO DIFF WBC: CPT | Mod: HCNC | Performed by: INTERNAL MEDICINE

## 2024-09-23 ENCOUNTER — PATIENT MESSAGE (OUTPATIENT)
Dept: FAMILY MEDICINE | Facility: CLINIC | Age: 79
End: 2024-09-23
Payer: MEDICARE

## 2024-09-24 VITALS — DIASTOLIC BLOOD PRESSURE: 81 MMHG | SYSTOLIC BLOOD PRESSURE: 156 MMHG

## 2024-09-24 DIAGNOSIS — G72.9 MYOPATHY: ICD-10-CM

## 2024-09-24 DIAGNOSIS — D86.9 SARCOIDOSIS: Chronic | ICD-10-CM

## 2024-09-24 DIAGNOSIS — R56.9 SEIZURE: ICD-10-CM

## 2024-09-24 DIAGNOSIS — M79.10 MYALGIA: ICD-10-CM

## 2024-09-24 NOTE — TELEPHONE ENCOUNTER
No care due was identified.  [facility] Embedded Care Due Messages. Reference number: [id number].   9/24/2024 11:21:34 AM CDT

## 2024-09-24 NOTE — TELEPHONE ENCOUNTER
Refill Routing Note   Medication(s) are not appropriate for processing by [facility] for the following reason(s):        Outside of protocol    [facility] action(s):  Route               Appointments  past 12m or future 3m with PCP    Date Provider   Last Visit   7/10/2024 Micaela Mendoza MD   Next Visit   10/10/2024 Mciaela Mendoza MD   ED visits in past 90 days: 0        Note composed:4:48 PM 09/24/2024

## 2024-09-25 RX ORDER — FOLIC ACID 1 MG/1
1000 TABLET ORAL DAILY
Qty: 90 TABLET | Refills: 1 | Status: SHIPPED | OUTPATIENT
Start: 2024-09-25

## 2024-09-26 ENCOUNTER — OFFICE VISIT (OUTPATIENT)
Dept: CARDIOLOGY | Facility: CLINIC | Age: 79
End: 2024-09-26
Payer: MEDICARE

## 2024-09-26 ENCOUNTER — LAB VISIT (OUTPATIENT)
Dept: LAB | Facility: HOSPITAL | Age: 79
End: 2024-09-26
Attending: FAMILY MEDICINE
Payer: MEDICARE

## 2024-09-26 ENCOUNTER — OFFICE VISIT (OUTPATIENT)
Dept: FAMILY MEDICINE | Facility: CLINIC | Age: 79
End: 2024-09-26
Payer: MEDICARE

## 2024-09-26 VITALS
OXYGEN SATURATION: 95 % | DIASTOLIC BLOOD PRESSURE: 82 MMHG | BODY MASS INDEX: 28.56 KG/M2 | HEART RATE: 103 BPM | HEIGHT: 62 IN | SYSTOLIC BLOOD PRESSURE: 132 MMHG | WEIGHT: 155.19 LBS

## 2024-09-26 DIAGNOSIS — R06.02 SHORTNESS OF BREATH: Primary | ICD-10-CM

## 2024-09-26 DIAGNOSIS — E78.2 COMBINED HYPERLIPIDEMIA ASSOCIATED WITH TYPE 2 DIABETES MELLITUS: Primary | Chronic | ICD-10-CM

## 2024-09-26 DIAGNOSIS — I10 HYPERTENSION, UNCONTROLLED: ICD-10-CM

## 2024-09-26 DIAGNOSIS — E11.44: ICD-10-CM

## 2024-09-26 DIAGNOSIS — R06.02 SHORTNESS OF BREATH: ICD-10-CM

## 2024-09-26 DIAGNOSIS — R06.09 DOE (DYSPNEA ON EXERTION): ICD-10-CM

## 2024-09-26 DIAGNOSIS — I10 ESSENTIAL HYPERTENSION: Chronic | ICD-10-CM

## 2024-09-26 DIAGNOSIS — E11.69 COMBINED HYPERLIPIDEMIA ASSOCIATED WITH TYPE 2 DIABETES MELLITUS: Primary | Chronic | ICD-10-CM

## 2024-09-26 DIAGNOSIS — I70.0 AORTIC ATHEROSCLEROSIS: ICD-10-CM

## 2024-09-26 DIAGNOSIS — R07.89 CHEST PAIN, ATYPICAL: ICD-10-CM

## 2024-09-26 DIAGNOSIS — I27.20 PULMONARY HYPERTENSION, UNSPECIFIED: ICD-10-CM

## 2024-09-26 DIAGNOSIS — E87.1 HYPONATREMIA: ICD-10-CM

## 2024-09-26 DIAGNOSIS — R73.9 HYPERGLYCEMIA: ICD-10-CM

## 2024-09-26 LAB
ALBUMIN SERPL BCP-MCNC: 4 G/DL (ref 3.5–5.2)
ALP SERPL-CCNC: 104 U/L (ref 55–135)
ALT SERPL W/O P-5'-P-CCNC: 23 U/L (ref 10–44)
ANION GAP SERPL CALC-SCNC: 12 MMOL/L (ref 8–16)
AST SERPL-CCNC: 22 U/L (ref 10–40)
BILIRUB SERPL-MCNC: 0.4 MG/DL (ref 0.1–1)
BNP SERPL-MCNC: 12 PG/ML (ref 0–99)
BUN SERPL-MCNC: 17 MG/DL (ref 8–23)
CALCIUM SERPL-MCNC: 9.8 MG/DL (ref 8.7–10.5)
CHLORIDE SERPL-SCNC: 97 MMOL/L (ref 95–110)
CO2 SERPL-SCNC: 25 MMOL/L (ref 23–29)
CREAT SERPL-MCNC: 1.2 MG/DL (ref 0.5–1.4)
EST. GFR  (NO RACE VARIABLE): 46.3 ML/MIN/1.73 M^2
ESTIMATED AVG GLUCOSE: 134 MG/DL (ref 68–131)
GLUCOSE SERPL-MCNC: 113 MG/DL (ref 70–110)
HBA1C MFR BLD: 6.3 % (ref 4–5.6)
POTASSIUM SERPL-SCNC: 3.5 MMOL/L (ref 3.5–5.1)
PROT SERPL-MCNC: 7.4 G/DL (ref 6–8.4)
SODIUM SERPL-SCNC: 134 MMOL/L (ref 136–145)

## 2024-09-26 PROCEDURE — 83036 HEMOGLOBIN GLYCOSYLATED A1C: CPT | Mod: HCNC | Performed by: FAMILY MEDICINE

## 2024-09-26 PROCEDURE — 1157F ADVNC CARE PLAN IN RCRD: CPT | Mod: HCNC,CPTII,95, | Performed by: FAMILY MEDICINE

## 2024-09-26 PROCEDURE — 3072F LOW RISK FOR RETINOPATHY: CPT | Mod: HCNC,CPTII,S$GLB, | Performed by: INTERNAL MEDICINE

## 2024-09-26 PROCEDURE — 99214 OFFICE O/P EST MOD 30 MIN: CPT | Mod: HCNC,95,, | Performed by: FAMILY MEDICINE

## 2024-09-26 PROCEDURE — 1157F ADVNC CARE PLAN IN RCRD: CPT | Mod: HCNC,CPTII,S$GLB, | Performed by: INTERNAL MEDICINE

## 2024-09-26 PROCEDURE — 3072F LOW RISK FOR RETINOPATHY: CPT | Mod: HCNC,CPTII,95, | Performed by: FAMILY MEDICINE

## 2024-09-26 PROCEDURE — 3288F FALL RISK ASSESSMENT DOCD: CPT | Mod: HCNC,CPTII,S$GLB, | Performed by: INTERNAL MEDICINE

## 2024-09-26 PROCEDURE — 3075F SYST BP GE 130 - 139MM HG: CPT | Mod: HCNC,CPTII,S$GLB, | Performed by: INTERNAL MEDICINE

## 2024-09-26 PROCEDURE — 1125F AMNT PAIN NOTED PAIN PRSNT: CPT | Mod: HCNC,CPTII,S$GLB, | Performed by: INTERNAL MEDICINE

## 2024-09-26 PROCEDURE — 3079F DIAST BP 80-89 MM HG: CPT | Mod: HCNC,CPTII,S$GLB, | Performed by: INTERNAL MEDICINE

## 2024-09-26 PROCEDURE — 83880 ASSAY OF NATRIURETIC PEPTIDE: CPT | Mod: HCNC | Performed by: FAMILY MEDICINE

## 2024-09-26 PROCEDURE — 1159F MED LIST DOCD IN RCRD: CPT | Mod: HCNC,CPTII,S$GLB, | Performed by: INTERNAL MEDICINE

## 2024-09-26 PROCEDURE — 99999 PR PBB SHADOW E&M-EST. PATIENT-LVL V: CPT | Mod: PBBFAC,HCNC,, | Performed by: INTERNAL MEDICINE

## 2024-09-26 PROCEDURE — 80053 COMPREHEN METABOLIC PANEL: CPT | Mod: HCNC | Performed by: FAMILY MEDICINE

## 2024-09-26 PROCEDURE — 36415 COLL VENOUS BLD VENIPUNCTURE: CPT | Mod: HCNC,PO | Performed by: FAMILY MEDICINE

## 2024-09-26 PROCEDURE — 1101F PT FALLS ASSESS-DOCD LE1/YR: CPT | Mod: HCNC,CPTII,S$GLB, | Performed by: INTERNAL MEDICINE

## 2024-09-26 PROCEDURE — 99204 OFFICE O/P NEW MOD 45 MIN: CPT | Mod: HCNC,S$GLB,, | Performed by: INTERNAL MEDICINE

## 2024-09-26 RX ORDER — INSULIN GLARGINE 100 [IU]/ML
20 INJECTION, SOLUTION SUBCUTANEOUS NIGHTLY
Qty: 18 ML | Refills: 1 | Status: SHIPPED | OUTPATIENT
Start: 2024-09-26 | End: 2025-03-25

## 2024-09-26 RX ORDER — NIFEDIPINE 30 MG/1
30 TABLET, EXTENDED RELEASE ORAL DAILY
Qty: 90 TABLET | Refills: 1 | Status: SHIPPED | OUTPATIENT
Start: 2024-09-26 | End: 2025-03-25

## 2024-09-26 RX ORDER — LEVETIRACETAM 1000 MG/1
1000 TABLET ORAL 2 TIMES DAILY
Qty: 60 TABLET | Refills: 5 | Status: SHIPPED | OUTPATIENT
Start: 2024-09-26 | End: 2025-09-26

## 2024-09-26 RX ORDER — INSULIN GLARGINE 100 [IU]/ML
120 INJECTION, SOLUTION SUBCUTANEOUS NIGHTLY
Qty: 108 ML | Refills: 1 | Status: SHIPPED | OUTPATIENT
Start: 2024-09-26 | End: 2024-09-26 | Stop reason: SDUPTHER

## 2024-09-26 NOTE — PROGRESS NOTES
Chief Complaint   Patient presents with    Hypertension    Blood Sugar Problem       The patient location is:  Patient Home   The chief complaint leading to consultation is: hypertension and high blood sugar  Visit type: Virtual visit with synchronous audio and video  Total time spent with patient: 15 min  Each patient to whom he or she provides medical services by telemedicine is:  (1) informed of the relationship between the physician and patient and the respective role of any other health care provider with respect to management of the patient; and (2) notified that he or she may decline to receive medical services by telemedicine and may withdraw from such care at any time.      DUSTIN Lawrence is a 78 y.o. female with multiple medical diagnoses as listed in the medical history and problem list that presents for follow-up for elevated blood pressure and blood sugar    Blood sugar- has been running in the 400s, came down with 20 units of lantus but is now back to 10 units and her sugars are elevated  Shortness of breath-she has had worsening shortness of breath, has had echo and was told by hematology she needed to see a cardiologist  High blood pressure-has been elevated in the 160s-180s at home, she does not have hydralazine, she has had lower leg swelling along with abdominal discomfort    PAST MEDICAL HISTORY:  Past Medical History:   Diagnosis Date    Acid reflux     Allergy     Alopecia     Anemia     Anemia in CKD (chronic kidney disease) 09/22/2016    Anxiety     Arthritis     Back pain     Cataract     Chronic kidney disease     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, without long-term current use of insulin     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, without long-term current use of insulin     Depression     Diabetes mellitus, type 2     Eye injury as a child     k-abrasion  od     Hyperlipidemia     Hypertension     Hypothyroidism     Immune deficiency disorder     Immune disorder     LOC (loss of consciousness) 2021    at home    Myalgia and myositis 2012    Osteoporosis     Polyneuropathy     Pulmonary embolism 07/10/2021    Renal manifestation of secondary diabetes mellitus     Sarcoidosis     Seizure     Type 2 diabetes mellitus     Ulcer     no cancer    Urinary incontinence        PAST SURGICAL HISTORY:  Past Surgical History:   Procedure Laterality Date    CARPAL TUNNEL RELEASE      Rt wrist    CATARACT EXTRACTION W/  INTRAOCULAR LENS IMPLANT Right 2015    Dr. Azevedo    CATARACT EXTRACTION W/  INTRAOCULAR LENS IMPLANT Left 2015    Dr. Azevedo    CERVICAL SPINE SURGERY       SECTION      CHOLECYSTECTOMY      INJECTION OF ANESTHETIC AGENT AROUND NERVE Left 2020    Procedure: BLOCK, NERVE LEFT FEMORAL AND OBTURATOR;  Surgeon: Alfonso Richards MD;  Location: [facility];  Service: Pain Management;  Laterality: Left;  NEEDS CONSENT    INJECTION OF ANESTHETIC AGENT AROUND NERVE Bilateral 10/09/2023    Procedure: BLOCK, NERVE, BILATERAL L3-L4-L5 MEDIAL BRANCH;  Surgeon: Alfonso Richards MD;  Location: [facility];  Service: Pain Management;  Laterality: Bilateral;    INJECTION OF JOINT Left 2019    Procedure: Injection, Joint  fLUOROSCOPIC jOINT iNJECTION (hIP iNJECTION) LEFT ROCH BURSA AS WELL LEFT TROCHANTERIC BURSA;  Surgeon: Alfonso Richards MD;  Location: [facility];  Service: Pain Management;  Laterality: Left;  NEEDS CONSENT, DIABETIC    INJECTION OF JOINT Left 2019    Procedure: Injection, Joint FLUOROSCOPIC JOINT INJECTION (HIP INJECTION) LEFT HIP;  Surgeon: Alfonso Richards MD;  Location: [facility];  Service: Pain Management;  Laterality: Left;  NEEDS CONSENT    INJECTION OF JOINT Left 2019    Procedure: INJECTION, JOINT;  Surgeon: Alfonso Richards MD;  Location: [facility];  Service: Pain Management;  Laterality: Left;  Left  Hip and Left GTB Injections    INJECTION OF JOINT Left 07/27/2020    Procedure: INJECTION, JOINT, LEFT HIP and LEFT GREATER TROCHANTERIC BURSA;  Surgeon: Alfonso Richards MD;  Location: [facility];  Service: Pain Management;  Laterality: Left;  INJECTION, JOINT, LEFT HIP and LEFT GREATER TROCHANTERIC BURSA    INJECTION OF JOINT Left 09/03/2020    Procedure: INJECTION, JOINT, LEFT SI;  Surgeon: Alfonso Richards MD;  Location: [facility];  Service: Pain Management;  Laterality: Left;  INJECTION, JOINT, LEFT SI    TRANSFORAMINAL EPIDURAL INJECTION OF STEROID Bilateral 12/05/2019    Procedure: INJECTION, STEROID, EPIDURAL, TRANSFORAMINAL APPROACH;  Surgeon: Alfonso Richards MD;  Location: [facility];  Service: Pain Management;  Laterality: Bilateral;  B/L TF RHIANNA L5  Consent Needed    TRANSFORAMINAL EPIDURAL INJECTION OF STEROID Bilateral 06/29/2020    Procedure: INJECTION, STEROID, EPIDURAL, TRANSFORAMINAL APPROACH L5/S1;  Surgeon: Alfonso Richards MD;  Location: [facility];  Service: Pain Management;  Laterality: Bilateral;  B/L TF RHAINNA L5/S1    TUBAL LIGATION         SOCIAL HISTORY:  Social History     Socioeconomic History    Marital status:      Spouse name: Gasper     Number of children: 5    Years of education: Business school after high school    Highest education level: 12th grade   Tobacco Use    Smoking status: Never     Passive exposure: Never    Smokeless tobacco: Never   Substance and Sexual Activity    Alcohol use: No    Drug use: No    Sexual activity: Not Currently     Partners: Male     Social Drivers of Health     Financial Resource Strain: Low Risk  (7/30/2024)    Overall Financial Resource Strain (CARDIA)     Difficulty of Paying Living Expenses: Not hard at all   Food Insecurity: No Food Insecurity (7/30/2024)    Hunger Vital Sign     Worried About Running Out of Food in the Last Year: Never true     Ran Out of Food in the Last Year: Never true   Transportation Needs: No Transportation Needs  (7/30/2024)    PRAPARE - Transportation     Lack of Transportation (Medical): No     Lack of Transportation (Non-Medical): No   Physical Activity: Inactive (7/30/2024)    Exercise Vital Sign     Days of Exercise per Week: 0 days     Minutes of Exercise per Session: 0 min   Stress: No Stress Concern Present (7/30/2024)    [place] [place] [facility] [employer] [facility] - Occupational Stress Questionnaire     Feeling of Stress : Not at all   Housing Stability: Low Risk  (7/30/2024)    Housing Stability Vital Sign     Unable to Pay for Housing in the Last Year: No     Homeless in the Last Year: No       FAMILY HISTORY:  Family History   Problem Relation Name Age of Onset    Hypertension Mother Martial     Cataracts Mother Martial     No Known Problems Father      Hypertension Maternal Grandmother Nora     Glaucoma Sister Cristina     Arthritis Sister Cristina     No Known Problems Brother Kofi     No Known Problems Maternal Aunt      No Known Problems Maternal Uncle      No Known Problems Paternal Aunt      No Known Problems Paternal Uncle      No Known Problems Maternal Grandfather      No Known Problems Paternal Grandmother      No Known Problems Paternal Grandfather      Kidney failure Sister Rita     Hepatitis Sister Deepali     Cancer Sister Deepali         bone cancer     Immunodeficiency Sister Christiana     Diabetes Son x4     Hypertension Son x4     Lupus Neg Hx      Rheum arthritis Neg Hx      Amblyopia Neg Hx      Blindness Neg Hx      Macular degeneration Neg Hx      Retinal detachment Neg Hx      Strabismus Neg Hx      Stroke Neg Hx      Thyroid disease Neg Hx      Endometrial cancer Neg Hx      Vaginal cancer Neg Hx      Cervical cancer Neg Hx         ALLERGIES AND MEDICATIONS: updated and reviewed.  Review of patient's allergies indicates:   Allergen Reactions    Azathioprine Shortness Of Breath and Other (See Comments)     Fatigue     Medication List with Changes/Refills   New Medications    NIFEDIPINE  (PROCARDIA-XL) 30 MG (OSM) 24 HR TABLET    Take 1 tablet (30 mg total) by mouth once daily.   Current Medications    ACCU-CHEK FASTCLIX LANCING DEV KIT    USE AS DIRECTED.    ALBUTEROL-IPRATROPIUM (DUO-NEB) 2.5 MG-0.5 MG/3 ML NEBULIZER SOLUTION    Take 3 mLs by nebulization every 4 (four) hours as needed for Wheezing or Shortness of Breath. Rescue    APIXABAN (ELIQUIS) 5 MG TAB    Take 1 tablet (5 mg total) by mouth 2 (two) times daily. Start this prescription after finishing starter pack    ATORVASTATIN (LIPITOR) 20 MG TABLET    Take 1 tablet (20 mg total) by mouth once daily.    BLOOD SUGAR DIAGNOSTIC (ACCU-CHEK SMARTVIEW TEST STRIP) STRP    Use as directed to check blood sugar twice daily.    BLOOD SUGAR DIAGNOSTIC STRP    To check BG three times daily, to use with insurance preferred meter    BLOOD-GLUCOSE METER KIT    Use as instructed    CARVEDILOL (COREG) 25 MG TABLET    Take 1 tablet (25 mg total) by mouth 2 (two) times daily.    CETIRIZINE (ZYRTEC) 10 MG TABLET    Take 1 tablet (10 mg total) by mouth once daily.    DICLOFENAC SODIUM (VOLTAREN) 1 % GEL    Apply 2 g topically once daily.    DULOXETINE (CYMBALTA) 60 MG CAPSULE    Take 1 capsule (60 mg total) by mouth once daily.    FENOFIBRATE 160 MG TAB    Take 1 tablet by mouth once daily    FLUTICASONE PROPION-SALMETEROL 115-21 MCG/DOSE (ADVAIR HFA) 115-21 MCG/ACTUATION HFAA INHALER    Inhale 2 puffs into the lungs every 12 (twelve) hours. Controller    FOLIC ACID (FOLVITE) 1 MG TABLET    Take 1 tablet (1,000 mcg total) by mouth once daily.    LEVETIRACETAM (KEPPRA) 1000 MG TABLET    Take 1 tablet (1,000 mg total) by mouth 2 (two) times daily.    LEVOTHYROXINE (SYNTHROID) 50 MCG TABLET    Take 1 tablet (50 mcg total) by mouth before breakfast.    LINACLOTIDE (LINZESS) 72 MCG CAP CAPSULE    TAKE 1 CAPSULE BY MOUTH ONCE DAILY BEFORE BREAKFAST    NIFEDIPINE (PROCARDIA-XL) 90 MG (OSM) 24 HR TABLET    Take 1 tablet (90 mg total) by mouth once daily.     "PANTOPRAZOLE (PROTONIX) 40 MG TABLET    Take 1 tablet (40 mg total) by mouth once daily.    PEN NEEDLE, DIABETIC (BD ULTRA-FINE ANA PEN NEEDLE) 32 GAUGE X 5/32" NDLE    For use with insulin pen    PREDNISONE (DELTASONE) 1 MG TABLET    TAKE 4 TABLETS BY MOUTH ONCE DAILY FOR  ONE  MONTH  THEN  DECREASE  BY  1  TABLET  EVERY  MONTH  IF  LABS  ALLOW    PREGABALIN (LYRICA) 75 MG CAPSULE    Take 1 capsule (75 mg total) by mouth 2 (two) times daily.    PROMETHAZINE-DEXTROMETHORPHAN (PROMETHAZINE-DM) 6.25-15 MG/5 ML SYRP    Take one tsp po q 6 hrs prn cough    TIZANIDINE (ZANAFLEX) 2 MG TABLET    Take 2 tablets (4 mg total) by mouth every 8 (eight) hours as needed (muscle spasm).    TRIAZOLAM (HALCION) 0.25 MG TAB       Changed and/or Refilled Medications    Modified Medication Previous Medication    INSULIN GLARGINE U-100, LANTUS, (LANTUS SOLOSTAR U-100 INSULIN) 100 UNIT/ML (3 ML) INPN PEN insulin (LANTUS SOLOSTAR U-100 INSULIN) glargine 100 units/mL SubQ pen       Inject 20 Units into the skin every evening.    Inject 10 Units into the skin every evening.       ROS  Review of Systems   Constitutional:  Negative for chills, diaphoresis, fatigue, fever and unexpected weight change.   HENT:  Negative for rhinorrhea, sinus pressure, sore throat and tinnitus.    Eyes:  Negative for photophobia and visual disturbance.   Respiratory:  Positive for shortness of breath. Negative for cough and wheezing.    Cardiovascular:  Negative for chest pain and palpitations.   Gastrointestinal:  Positive for abdominal distention. Negative for abdominal pain, blood in stool, constipation, diarrhea, nausea and vomiting.   Genitourinary:  Negative for dysuria, flank pain, frequency and vaginal discharge.   Musculoskeletal:  Positive for arthralgias and back pain. Negative for joint swelling.   Skin:  Negative for rash.   Neurological:  Negative for speech difficulty, weakness, light-headedness and headaches.   Psychiatric/Behavioral:  Negative " for behavioral problems and dysphoric mood.        Physical Exam  There were no vitals filed for this visit. There is no height or weight on file to calculate BMI.            Physical Exam  Vitals and nursing note reviewed.   Constitutional:       Appearance: She is well-developed.   Skin:     General: Skin is warm and dry.      Findings: No erythema or rash.   Neurological:      Mental Status: She is alert. Mental status is at baseline.   Psychiatric:         Behavior: Behavior normal.         Health Maintenance         Date Due Completion Date    Shingles Vaccine (1 of 2) 07/20/2015 5/25/2015    RSV Vaccine (Age 60+ and Pregnant patients) (1 - 1-dose 75+ series) Never done ---    Lipid Panel 12/09/2023 12/9/2022    Influenza Vaccine (1) 09/01/2024 11/3/2023    Override on 8/8/2018: Done    Override on 9/27/2017: Done    COVID-19 Vaccine (6 - 2024-25 season) 09/01/2024 11/3/2023    Diabetes Urine Screening 09/08/2024 9/8/2023    Eye Exam 10/25/2024 10/25/2023    DEXA Scan 11/07/2024 11/7/2022    Hemoglobin A1c 03/26/2025 9/26/2024    TETANUS VACCINE 05/16/2026 5/16/2016            Health maintenance reviewed and addressed as ordered      ASSESSMENT     1. Shortness of breath    2. Hyperglycemia    3. 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 Hyponatremia    5. Hypertension, uncontrolled        PLAN:     Problem List Items Addressed This Visit          Pulmonary    Shortness of breath - Primary  R/o heart failure with BNP  Refer to cardiology    Overview     Acute onset of shortness of breath in June of this year.  CTA negative for parenchymal findings or PE.  Reportedly was attributed to sarcoid, however does not appear to have received high-dose steroids to treat such.  Unclear etiology but seems to have been an acute process given its complete resolution.     Reported significant improvement with administration of Advair, so stands to reason it  may have been some lingering reactive airways disease. Patient feels she has improved since initiation of ICS/LABA and reports benefit from nebulizer therapy.          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, without long-term current use of insulin  Increase lantus to 20 units nightly    Relevant Medications    insulin glargine U-100, Lantus, (LANTUS SOLOSTAR U-100 INSULIN) 100 unit/mL (3 mL) InPn pen    Other Relevant Orders    Hemoglobin A1C     Other Visit Diagnoses       Hyperglycemia      Increase lantus to 20 units nightly    Relevant Orders    Hemoglobin A1C    Hyponatremia      Repeat labs    Relevant Orders    Comprehensive Metabolic Panel    Hypertension, uncontrolled      Continue nifedipine to 120mg, continue other medications    Relevant Medications    NIFEdipine (PROCARDIA-XL) 30 MG (OSM) 24 hr tablet              Micaela Mendoza MD  09/30/2024 11:11 AM        Follow up in about 4 weeks (around 10/24/2024).    Orders Placed This Encounter   Procedures    Hemoglobin A1C    Comprehensive Metabolic Panel    B-TYPE NATRIURETIC PEPTIDE    Ambulatory referral/consult to Cardiology

## 2024-09-26 NOTE — PROGRESS NOTES
Subjective   Patient ID:  Regino Lawrence is a 78 y.o. female who presents for follow-up of No chief complaint on file.      HPI      HTN, DM, HLD,  sarcoidosis, pulmonary HTN     Previously saw Dr Holden  HPI: She is schedule for gynecologic surgery on Monday. As part of her preoperative evaluation, an ECG was done which she was told was abnormal. I do not have the ECG to review. She has no cardiac history. Regino Lawrence denies any chest pain, shortness of breath, PND, orthopnea, palpitations, or syncope. Her ECG today is normal. All ECG's reviewed in [facility] were normal. She walks with a cane due to arthritis.     1. Controlled type 2 diabetes mellitus without complication, without long-term current use of insulin    2. Preop cardiovascular exam : She has 0 clinical risk factors according to the [facility]CR[facility] placing her at low risk for a perioperative cardiac complication. She has no symptoms suggestive of cardiopulmonary disease, and her ECG is normal. No further cardiac testing is required prior to her surgery. She should continue on her clonidine during the perioperative period to avoid rebound hypertension.   3. Essential hypertension       Echo 9/19/24    Left Ventricle: The left ventricle is normal in size. Mildly increased wall thickness. There is mild concentric hypertrophy. There is normal systolic function with a visually estimated ejection fraction of 55 - 60%. Grade I diastolic dysfunction.    Right Ventricle: Normal right ventricular cavity size. Systolic function is normal.    Tricuspid Valve: There is mild regurgitation.    Pulmonary Artery: The estimated pulmonary artery systolic pressure is 53 mmHg.       Stress test 4/11/19  Normal myocardial perfusion scan  There were no arrhythmias during stress.  There was no ST segment deviation noted during stress.  The patient reported dizziness, SOB (non-anginal) and light headedness during the stress test.  The perfusion scan is free of evidence from myocardial  ischemia or injury.  LVEF post-stress is 62%  The EKG portion of this study is negative for myocardial ischemia.        4/3/19 Reports worsening FAUSTIN for several months - sometimes associated with chest tightness  EKG sinus tachycardia 104 otherwise ok  Denies prior CAD  Walks with a walker     5/1/19 Still with episodes of chest tightness - usually lasts all day  Not reproducible with palpation   CP atypical - likely musculoskeletal - will observe  OV 3 months     9/19/19 Having issues with hip pain  CP improved  Reports SOB with cough productive of clear mucus  EKG NSR PRWP - similar to previous EKG   Will give doxycycline for URI  Otherwise cardiac stable  OV 6 months      EKG 10/18/19 NSR - normal    10/24/19 Continues to report FAUSTIN   Denies CP  Scheduled to see pulmonary next week  Doubt FAUSTIN is cardiac  Agree with pulmonary evalution  Back to the ER if symptoms worsen    EKG 9/17/24 sinus tachycardia 107 otherwise ok      9/26/24 Recently reports having increased FAUSTIN and a dry cough, denies CP  BP controlled - mild tachycardia noted    Review of Systems   Constitutional: Negative for decreased appetite.   HENT:  Negative for ear discharge.    Eyes:  Negative for blurred vision.   Respiratory:  Negative for hemoptysis.    Endocrine: Negative for polyphagia.   Hematologic/Lymphatic: Negative for adenopathy.   Skin:  Negative for color change.   Musculoskeletal:  Negative for joint swelling.   Genitourinary:  Negative for bladder incontinence.   Neurological:  Negative for brief paralysis.   Psychiatric/Behavioral:  Negative for hallucinations.    Allergic/Immunologic: Negative for hives.          Objective     Physical Exam  Constitutional:       Appearance: She is well-developed.   HENT:      Head: Normocephalic and atraumatic.   Eyes:      Conjunctiva/sclera: Conjunctivae normal.      Pupils: Pupils are equal, round, and reactive to light.   Cardiovascular:      Rate and Rhythm: Normal rate.      Pulses: Intact  distal pulses.      Heart sounds: Normal heart sounds.   Pulmonary:      Effort: Pulmonary effort is normal.      Breath sounds: Normal breath sounds.   Abdominal:      General: Bowel sounds are normal.      Palpations: Abdomen is soft.   Musculoskeletal:         General: Normal range of motion.      Cervical back: Normal range of motion and neck supple.   Skin:     General: Skin is warm and dry.   Neurological:      Mental Status: She is alert and oriented to person, place, and time.            Assessment and Plan     1. Combined 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    2. Shortness of breath    3. Essential hypertension    4. Aortic atherosclerosis    5. Pulmonary hypertension, unspecified    6. Chest pain, atypical    7. FAUSTIN (dyspnea on exertion)        Plan:     Lexiscan myoview for recent worsening FAUSTIN, echo with normal EF and some worsening pulmonary HTN - suspect SOB is more pulmonary than cardiac  BNP ordered by PCP  Continue Rx for HTN, pulmonary HTN, HLD    Advance Care Planning     Date: 09/26/2024  Patient did not wish or was not able to name a surrogate decision maker or provide an Advance Care Plan.

## 2024-09-30 DIAGNOSIS — N18.30 DIABETES MELLITUS WITH STAGE 3 CHRONIC KIDNEY DISEASE: ICD-10-CM

## 2024-09-30 DIAGNOSIS — G72.9 MYOPATHY: ICD-10-CM

## 2024-09-30 DIAGNOSIS — R56.9 SEIZURE: ICD-10-CM

## 2024-09-30 DIAGNOSIS — E11.22 DIABETES MELLITUS WITH STAGE 3 CHRONIC KIDNEY DISEASE: ICD-10-CM

## 2024-09-30 DIAGNOSIS — D86.9 SARCOIDOSIS: ICD-10-CM

## 2024-09-30 RX ORDER — LEVETIRACETAM 1000 MG/1
1000 TABLET ORAL 2 TIMES DAILY
Qty: 60 TABLET | Refills: 5 | Status: SHIPPED | OUTPATIENT
Start: 2024-09-30 | End: 2024-09-30

## 2024-09-30 RX ORDER — LEVETIRACETAM 750 MG/1
750 TABLET ORAL 2 TIMES DAILY
Qty: 60 TABLET | Refills: 11 | Status: SHIPPED | OUTPATIENT
Start: 2024-09-30 | End: 2025-09-30

## 2024-09-30 NOTE — TELEPHONE ENCOUNTER
Called patient regarding Keppra refill request.  Patient and her daughter present together.  Patient has not had any breakthrough seizure since last seen in the clinic. Patient's GFR has progressively worsened with GFR of 46 from 51.4 when she last saw me.  Given this, patient can be on a maximum of Keppra 750 mg b.i.d..     Plan:   - Keppra 1 g in am and 750 mg in pm for 1 week then switch to 750 mg BID and stay on the   - advised to call 911 and present to ER with any breakthrough seizure.    All questions answered during this phone call and both patient and daughter voiced understanding of the plan.    Pauline Knapp MD

## 2024-09-30 NOTE — TELEPHONE ENCOUNTER
No care due was identified.  [facility] Embedded Care Due Messages. Reference number: [id number].   9/30/2024 1:15:51 PM CDT

## 2024-10-01 RX ORDER — TIZANIDINE 2 MG/1
4 TABLET ORAL EVERY 8 HOURS PRN
Qty: 270 TABLET | Refills: 1 | Status: SHIPPED | OUTPATIENT
Start: 2024-10-01

## 2024-10-01 RX ORDER — PREDNISONE 1 MG/1
TABLET ORAL
Qty: 120 TABLET | Refills: 2 | Status: SHIPPED | OUTPATIENT
Start: 2024-10-01

## 2024-10-01 RX ORDER — PEN NEEDLE, DIABETIC 30 GX3/16"
NEEDLE, DISPOSABLE MISCELLANEOUS
Qty: 100 EACH | Refills: 3 | Status: SHIPPED | OUTPATIENT
Start: 2024-10-01

## 2024-10-02 ENCOUNTER — HOSPITAL ENCOUNTER (OUTPATIENT)
Dept: RADIOLOGY | Facility: HOSPITAL | Age: 79
Discharge: HOME | End: 2024-10-02
Attending: INTERNAL MEDICINE
Payer: MEDICARE

## 2024-10-02 ENCOUNTER — HOSPITAL ENCOUNTER (OUTPATIENT)
Dept: CARDIOLOGY | Facility: HOSPITAL | Age: 79
Discharge: HOME | End: 2024-10-02
Attending: INTERNAL MEDICINE
Payer: MEDICARE

## 2024-10-02 DIAGNOSIS — I10 ESSENTIAL HYPERTENSION: Chronic | ICD-10-CM

## 2024-10-02 DIAGNOSIS — E11.69 COMBINED HYPERLIPIDEMIA ASSOCIATED WITH TYPE 2 DIABETES MELLITUS: Chronic | ICD-10-CM

## 2024-10-02 DIAGNOSIS — R07.89 CHEST PAIN, ATYPICAL: ICD-10-CM

## 2024-10-02 DIAGNOSIS — R06.02 SHORTNESS OF BREATH: ICD-10-CM

## 2024-10-02 DIAGNOSIS — R06.09 DOE (DYSPNEA ON EXERTION): ICD-10-CM

## 2024-10-02 DIAGNOSIS — I27.20 PULMONARY HYPERTENSION, UNSPECIFIED: ICD-10-CM

## 2024-10-02 DIAGNOSIS — E78.2 COMBINED HYPERLIPIDEMIA ASSOCIATED WITH TYPE 2 DIABETES MELLITUS: Chronic | ICD-10-CM

## 2024-10-02 DIAGNOSIS — I70.0 AORTIC ATHEROSCLEROSIS: ICD-10-CM

## 2024-10-02 LAB
CV STRESS BASE HR: 74 BPM
DIASTOLIC BLOOD PRESSURE: 71 MMHG
NUC REST EJECTION FRACTION: 71
OHS CV CPX 85 PERCENT MAX PREDICTED HEART RATE MALE: 121
OHS CV CPX MAX PREDICTED HEART RATE: 142
OHS CV CPX PATIENT IS FEMALE: 1
OHS CV CPX PATIENT IS MALE: 0
OHS CV CPX PEAK DIASTOLIC BLOOD PRESSURE: 72 MMHG
OHS CV CPX PEAK HEAR RATE: 107 BPM
OHS CV CPX PEAK RATE PRESSURE PRODUCT: NORMAL
OHS CV CPX PEAK SYSTOLIC BLOOD PRESSURE: 150 MMHG
OHS CV CPX PERCENT MAX PREDICTED HEART RATE ACHIEVED: 78
OHS CV CPX RATE PRESSURE PRODUCT PRESENTING: NORMAL
OHS CV INITIAL DOSE: 10.6 MCG/KG/MIN
OHS CV PEAK DOSE: 30.3 MCG/KG/MIN
SYSTOLIC BLOOD PRESSURE: 146 MMHG

## 2024-10-02 PROCEDURE — 93016 CV STRESS TEST SUPVJ ONLY: CPT | Mod: HCNC,,, | Performed by: INTERNAL MEDICINE

## 2024-10-02 PROCEDURE — A9502 TC99M TETROFOSMIN: HCPCS | Mod: HCNC | Performed by: INTERNAL MEDICINE

## 2024-10-02 PROCEDURE — 63600175 PHARM REV CODE 636 W HCPCS: Mod: HCNC | Performed by: INTERNAL MEDICINE

## 2024-10-02 PROCEDURE — 78452 HT MUSCLE IMAGE SPECT MULT: CPT | Mod: HCNC

## 2024-10-02 PROCEDURE — 93017 CV STRESS TEST TRACING ONLY: CPT | Mod: HCNC

## 2024-10-02 PROCEDURE — 78452 HT MUSCLE IMAGE SPECT MULT: CPT | Mod: 26,HCNC,, | Performed by: INTERNAL MEDICINE

## 2024-10-02 PROCEDURE — 93018 CV STRESS TEST I&R ONLY: CPT | Mod: HCNC,,, | Performed by: INTERNAL MEDICINE

## 2024-10-02 RX ORDER — REGADENOSON 0.08 MG/ML
0.4 INJECTION, SOLUTION INTRAVENOUS ONCE
Status: COMPLETED | OUTPATIENT
Start: 2024-10-02 | End: 2024-10-02

## 2024-10-02 RX ADMIN — REGADENOSON 0.4 MG: 0.08 INJECTION, SOLUTION INTRAVENOUS at 09:10

## 2024-10-02 RX ADMIN — TETROFOSMIN 30.3 MILLICURIE: 1.38 INJECTION, POWDER, LYOPHILIZED, FOR SOLUTION INTRAVENOUS at 09:10

## 2024-10-02 RX ADMIN — TETROFOSMIN 10.6 MILLICURIE: 1.38 INJECTION, POWDER, LYOPHILIZED, FOR SOLUTION INTRAVENOUS at 08:10

## 2024-10-03 ENCOUNTER — OFFICE VISIT (OUTPATIENT)
Dept: NEPHROLOGY | Facility: CLINIC | Age: 79
End: 2024-10-03
Payer: MEDICARE

## 2024-10-03 ENCOUNTER — LAB VISIT (OUTPATIENT)
Dept: LAB | Facility: HOSPITAL | Age: 79
End: 2024-10-03
Payer: MEDICARE

## 2024-10-03 VITALS
SYSTOLIC BLOOD PRESSURE: 179 MMHG | DIASTOLIC BLOOD PRESSURE: 84 MMHG | WEIGHT: 157.19 LBS | HEART RATE: 65 BPM | BODY MASS INDEX: 28.75 KG/M2

## 2024-10-03 DIAGNOSIS — N18.1 CKD (CHRONIC KIDNEY DISEASE) STAGE 1, GFR 90 ML/MIN OR GREATER: ICD-10-CM

## 2024-10-03 DIAGNOSIS — N18.31 STAGE 3A CHRONIC KIDNEY DISEASE: ICD-10-CM

## 2024-10-03 DIAGNOSIS — N18.31 STAGE 3A CHRONIC KIDNEY DISEASE: Primary | ICD-10-CM

## 2024-10-03 LAB
HBV SURFACE AG SERPL QL IA: NORMAL
HCV AB SERPL QL IA: NORMAL

## 2024-10-03 PROCEDURE — 99204 OFFICE O/P NEW MOD 45 MIN: CPT | Mod: HCNC,GC,S$GLB, | Performed by: STUDENT IN AN ORGANIZED HEALTH CARE EDUCATION/TRAINING PROGRAM

## 2024-10-03 PROCEDURE — 3077F SYST BP >= 140 MM HG: CPT | Mod: HCNC,CPTII,GC,S$GLB | Performed by: STUDENT IN AN ORGANIZED HEALTH CARE EDUCATION/TRAINING PROGRAM

## 2024-10-03 PROCEDURE — 3079F DIAST BP 80-89 MM HG: CPT | Mod: HCNC,CPTII,GC,S$GLB | Performed by: STUDENT IN AN ORGANIZED HEALTH CARE EDUCATION/TRAINING PROGRAM

## 2024-10-03 PROCEDURE — 3288F FALL RISK ASSESSMENT DOCD: CPT | Mod: HCNC,CPTII,GC,S$GLB | Performed by: STUDENT IN AN ORGANIZED HEALTH CARE EDUCATION/TRAINING PROGRAM

## 2024-10-03 PROCEDURE — 86803 HEPATITIS C AB TEST: CPT | Mod: HCNC | Performed by: STUDENT IN AN ORGANIZED HEALTH CARE EDUCATION/TRAINING PROGRAM

## 2024-10-03 PROCEDURE — 36415 COLL VENOUS BLD VENIPUNCTURE: CPT | Mod: HCNC | Performed by: STUDENT IN AN ORGANIZED HEALTH CARE EDUCATION/TRAINING PROGRAM

## 2024-10-03 PROCEDURE — 87340 HEPATITIS B SURFACE AG IA: CPT | Mod: HCNC | Performed by: STUDENT IN AN ORGANIZED HEALTH CARE EDUCATION/TRAINING PROGRAM

## 2024-10-03 PROCEDURE — 1101F PT FALLS ASSESS-DOCD LE1/YR: CPT | Mod: HCNC,CPTII,GC,S$GLB | Performed by: STUDENT IN AN ORGANIZED HEALTH CARE EDUCATION/TRAINING PROGRAM

## 2024-10-03 PROCEDURE — 3072F LOW RISK FOR RETINOPATHY: CPT | Mod: HCNC,CPTII,GC,S$GLB | Performed by: STUDENT IN AN ORGANIZED HEALTH CARE EDUCATION/TRAINING PROGRAM

## 2024-10-03 PROCEDURE — 1157F ADVNC CARE PLAN IN RCRD: CPT | Mod: HCNC,CPTII,GC,S$GLB | Performed by: STUDENT IN AN ORGANIZED HEALTH CARE EDUCATION/TRAINING PROGRAM

## 2024-10-03 PROCEDURE — 1126F AMNT PAIN NOTED NONE PRSNT: CPT | Mod: HCNC,CPTII,GC,S$GLB | Performed by: STUDENT IN AN ORGANIZED HEALTH CARE EDUCATION/TRAINING PROGRAM

## 2024-10-03 PROCEDURE — 81479 UNLISTED MOLECULAR PATHOLOGY: CPT | Mod: HCNC | Performed by: STUDENT IN AN ORGANIZED HEALTH CARE EDUCATION/TRAINING PROGRAM

## 2024-10-03 PROCEDURE — 99999 PR PBB SHADOW E&M-EST. PATIENT-LVL III: CPT | Mod: PBBFAC,HCNC,GC, | Performed by: STUDENT IN AN ORGANIZED HEALTH CARE EDUCATION/TRAINING PROGRAM

## 2024-10-03 RX ORDER — CHLORTHALIDONE 25 MG/1
25 TABLET ORAL DAILY
Qty: 30 TABLET | Refills: 11 | Status: SHIPPED | OUTPATIENT
Start: 2024-10-03 | End: 2025-10-03

## 2024-10-04 ENCOUNTER — LAB VISIT (OUTPATIENT)
Dept: LAB | Facility: HOSPITAL | Age: 79
End: 2024-10-04
Attending: INTERNAL MEDICINE
Payer: MEDICARE

## 2024-10-04 ENCOUNTER — INFUSION (OUTPATIENT)
Dept: INFUSION THERAPY | Facility: HOSPITAL | Age: 79
End: 2024-10-04
Attending: INTERNAL MEDICINE
Payer: MEDICARE

## 2024-10-04 VITALS
TEMPERATURE: 98 F | OXYGEN SATURATION: 98 % | HEART RATE: 113 BPM | DIASTOLIC BLOOD PRESSURE: 86 MMHG | SYSTOLIC BLOOD PRESSURE: 159 MMHG | RESPIRATION RATE: 18 BRPM

## 2024-10-04 DIAGNOSIS — D63.1 ANEMIA IN STAGE 3 CHRONIC KIDNEY DISEASE, UNSPECIFIED WHETHER STAGE 3A OR 3B CKD: ICD-10-CM

## 2024-10-04 DIAGNOSIS — N18.9 ANEMIA IN CHRONIC KIDNEY DISEASE, UNSPECIFIED CKD STAGE: ICD-10-CM

## 2024-10-04 DIAGNOSIS — D63.1 ANEMIA IN STAGE 3 CHRONIC KIDNEY DISEASE: Primary | ICD-10-CM

## 2024-10-04 DIAGNOSIS — D50.9 IRON DEFICIENCY ANEMIA, UNSPECIFIED IRON DEFICIENCY ANEMIA TYPE: ICD-10-CM

## 2024-10-04 DIAGNOSIS — D63.1 ANEMIA IN CHRONIC KIDNEY DISEASE, UNSPECIFIED CKD STAGE: ICD-10-CM

## 2024-10-04 DIAGNOSIS — N18.30 ANEMIA IN STAGE 3 CHRONIC KIDNEY DISEASE, UNSPECIFIED WHETHER STAGE 3A OR 3B CKD: ICD-10-CM

## 2024-10-04 DIAGNOSIS — N18.30 ANEMIA IN STAGE 3 CHRONIC KIDNEY DISEASE: ICD-10-CM

## 2024-10-04 DIAGNOSIS — N18.30 ANEMIA IN STAGE 3 CHRONIC KIDNEY DISEASE: Primary | ICD-10-CM

## 2024-10-04 DIAGNOSIS — D63.1 ANEMIA IN STAGE 3 CHRONIC KIDNEY DISEASE: ICD-10-CM

## 2024-10-04 LAB — HGB BLD-MCNC: 10.8 G/DL (ref 12–16)

## 2024-10-04 PROCEDURE — 85018 HEMOGLOBIN: CPT | Mod: HCNC | Performed by: INTERNAL MEDICINE

## 2024-10-04 PROCEDURE — 36415 COLL VENOUS BLD VENIPUNCTURE: CPT | Mod: HCNC | Performed by: INTERNAL MEDICINE

## 2024-10-04 PROCEDURE — 63600175 PHARM REV CODE 636 W HCPCS: Mod: JZ,EC,JG,HCNC | Performed by: INTERNAL MEDICINE

## 2024-10-04 PROCEDURE — 96372 THER/PROPH/DIAG INJ SC/IM: CPT | Mod: HCNC

## 2024-10-04 RX ADMIN — EPOETIN ALFA-EPBX 40000 UNITS: 40000 INJECTION, SOLUTION INTRAVENOUS; SUBCUTANEOUS at 12:10

## 2024-10-04 NOTE — PLAN OF CARE
"Subjective   47-year-old male presents with complaint of left tib-fib and Achilles pain status post \"I stepped wrong and fell down a hill 4 days ago\".  Reports increased pain with swelling.  Denies ankle nor heel pain.  Denies fever sweats or chills.  Denies history of Achilles tendinitis.    1. Location: Left tib-fib  2. Quality: Burning  3. Severity: Moderate  4. Worsening factors: weight bearing  5. Alleviating factors: Denies  6. Onset: 4 days  7. Radiation: Achilles  8. Frequency: Constant with periods of intensity  9. Co-morbidities: Fall  10. Source: Patient              Review of Systems   Musculoskeletal: Positive for arthralgias and joint swelling.   Skin: Negative for rash.   All other systems reviewed and are negative.      Past Medical History:   Diagnosis Date   • Alopecia    • Edema    • Fatigue    • Gout    • Joint pain    • AMARJIT (obstructive sleep apnea)        No Known Allergies    Past Surgical History:   Procedure Laterality Date   • TONSILLECTOMY         Family History   Problem Relation Age of Onset   • Heart attack Mother    • Cancer Father    • Cancer Maternal Grandmother    • Sleep apnea Paternal Grandmother    • Heart disease Paternal Grandmother    • Stroke Paternal Grandmother    • Obesity Paternal Grandfather    • Heart attack Paternal Grandfather    • Heart disease Paternal Grandfather    • Sleep apnea Paternal Grandfather        Social History     Socioeconomic History   • Marital status:      Spouse name: Not on file   • Number of children: Not on file   • Years of education: Not on file   • Highest education level: Not on file   Tobacco Use   • Smoking status: Never Smoker   Substance and Sexual Activity   • Alcohol use: Yes           Objective   Physical Exam   Constitutional: He is oriented to person, place, and time. He appears well-developed and well-nourished. He is active.   Musculoskeletal: Normal range of motion. He exhibits edema and tenderness. He exhibits no " Hgb 10.8 today. Patient received q2w RETAcrit 40,000u. Tolerated injection well. VSS. Endorses worsening bilateral pedal edema x several weeks. Currently undergoing workup with nephrology. [facility]charge [facility]. Verbalized understanding and discharged from unit via personal scooter accompanied by daughter.    deformity.        Left lower leg: He exhibits tenderness and bony tenderness. He exhibits no swelling, no edema, no deformity and no laceration.        Legs:       Left foot: There is tenderness and swelling. There is normal range of motion, no bony tenderness, normal capillary refill, no crepitus, no deformity and no laceration.        Neurological: He is alert and oriented to person, place, and time.   Skin: Skin is warm, dry and intact. Capillary refill takes less than 2 seconds. No rash noted.   Psychiatric: He has a normal mood and affect. His behavior is normal. Judgment and thought content normal.   Nursing note and vitals reviewed.      Procedures           ED Course           Xr Tibia Fibula 2 View Left    Result Date: 8/15/2019  No acute fracture or traumatic malalignment identified.  Electronically Signed By-DR. Nomi Blanco MD On:8/15/2019 1:44 PM This report was finalized on [id number] by DR. Nomi Blanco MD.    Medications   HYDROcodone-acetaminophen (NORCO) 7.5-325 MG per tablet 1 tablet (1 tablet Oral Given 8/15/19 1315)     Labs Reviewed - No data to display            MDM  Number of Diagnoses or Management Options  Achilles tendinitis of left lower extremity:   Diagnosis management comments: Chart Review:  Comorbidity:  Imaging: Was interpreted by physician and reviewed by myself: Xr Tibia Fibula 2 View Left Result Date: 8/15/2019  No acute fracture or traumatic malalignment identified.      Disposition/Treatment: Discussed results with patient, verbalized understanding.  Agreeable with plan of care.    Patient undressed and placed in gown for exam. Norco 7.5/325mg PO for pain. Xray obtained of L tib/fib.  Ace wrap applied.  Patient given Medrol Dosepak.  Patient given follow-up with PCP for primary care needs.  Patient given follow-up with orthopedics and return to the ER for new or worsening symptoms.         Amount and/or Complexity of Data Reviewed  Tests in the radiology section of  CPT®: reviewed          Final diagnoses:   Achilles tendinitis of left lower extremity            Lore Cruz NP  08/15/19 3203

## 2024-10-06 LAB
ANNOTATION COMMENT IMP: NORMAL
APOL1 RESULT: NORMAL
GENETICIST REVIEW: NORMAL
LAB TEST METHOD: NORMAL
PROVIDER SIGNING NAME: NORMAL
TEST PERFORMANCE INFO SPEC: NORMAL

## 2024-10-06 NOTE — PROGRESS NOTES
Nephrology Clinic Note    78-year-old -American female with past medical history of, hypertension, osteoarthritis, Cervical spinal stenosis, Sarcoidosis,chronic anemia in CKD undergoing EPO injections.The patient reports that she has been diagnosed with JOLLY in the past.  She has been on oral iron supplementation therapy, but could not tolerate or did not respond, she is uncertain she also has  undergone intermittent IV iron therapy.  She is followed by GI and has undergone a colonoscopy earlier this year and was diagnosed with hemorrhoids for which she underwent banding procedure.  No melena, hematochezia,change in bowel habits.  She has also been diagnosed with B12 deficiency in the past, but reports she did not respond to B12 injections. No history of blood transfusions. She is a [employer].      She is followed by Rheumatology for history of sarcoidosis with associated myopathy and arthropathy. She has been treated in the  past with methotrexate and Plaquenil, both of which were ineffective. Cellcept and imuran caused some unknown side effect. Previously on Plaquenil and methotrexate but both discontinued due to adverse effects.  Leflunomide was also attempted but held due to elevated blood pressure. Currently on prednisone 3 mg daily.     Who presented to clinic using her electric scooter accompanied by her daughter to establish care and follow-up with her kidney health, Per chart review patient had been seen five years ago in our clinic        Chief Complaint   Patient presents with    Chronic Kidney Disease      History of present illness:  Patient is a 78 y.o. female with known       Creatinine   Date Value Ref Range Status   09/26/2024 1.2 0.5 - 1.4 mg/dL Final   09/06/2024 1.2 0.5 - 1.4 mg/dL Final   07/26/2024 1.2 0.5 - 1.4 mg/dL Final     BUN   Date Value Ref Range Status   09/26/2024 17 8 - 23 mg/dL Final   09/06/2024 19 8 - 23 mg/dL Final   07/26/2024 25 (H) 8 - 23 mg/dL Final     CO2  "  Date Value Ref Range Status   09/26/2024 25 23 - 29 mmol/L Final   09/06/2024 28 23 - 29 mmol/L Final   07/26/2024 23 23 - 29 mmol/L Final         Proteinuria:   No results found for: "UTPCR"      CKD anemia  Hemoglobin   Date Value Ref Range Status   10/04/2024 10.8 (L) 12.0 - 16.0 g/dL Final   09/20/2024 11.3 (L) 12.0 - 16.0 g/dL Final   09/06/2024 10.8 (L) 12.0 - 16.0 g/dL Final     Saturated Iron   Date Value Ref Range Status   09/06/2024 35 20 - 50 % Final   06/03/2024 16 (L) 20 - 50 % Final   05/31/2024 21 20 - 50 % Final     Ferritin   Date Value Ref Range Status   09/06/2024 944 (H) 20.0 - 300.0 ng/mL Final   06/03/2024 74 20.0 - 300.0 ng/mL Final   05/31/2024 64 20.0 - 300.0 ng/mL Final     Iron   Date Value Ref Range Status   09/06/2024 110 30 - 160 ug/dL Final   06/03/2024 75 30 - 160 ug/dL Final   05/31/2024 97 30 - 160 ug/dL Final     TIBC   Date Value Ref Range Status   09/06/2024 318 250 - 450 ug/dL Final   06/03/2024 457 (H) 250 - 450 ug/dL Final     Vitamin B-12   Date Value Ref Range Status   02/24/2021 198 (L) 210 - 950 pg/mL Final   03/07/2016 850 210 - 950 pg/mL Final     Folate   Date Value Ref Range Status   02/24/2021 18.5 4.0 - 24.0 ng/mL Final   07/06/2015 10.8 4.0 - 24.0 ng/mL Final       MBD  PTH, Intact   Date Value Ref Range Status   02/24/2021 81.7 (H) 9.0 - 77.0 pg/mL Final     Calcium   Date Value Ref Range Status   09/26/2024 9.8 8.7 - 10.5 mg/dL Final   09/06/2024 9.0 8.7 - 10.5 mg/dL Final     Phosphorus   Date Value Ref Range Status   07/10/2024 2.7 2.7 - 4.5 mg/dL Final   02/25/2021 2.4 (L) 2.7 - 4.5 mg/dL Final     Vit D, 25-Hydroxy   Date Value Ref Range Status   02/24/2021 9 (L) 30 - 96 ng/mL Final     Comment:     Vitamin D deficiency.........<10 ng/mL                              Vitamin D insufficiency......10-29 ng/mL       Vitamin D sufficiency........> or equal to 30 ng/mL  Vitamin D toxicity............>100 ng/mL           #HTN  Results for orders placed during the " hospital encounter of 09/19/24    Echo Saline Bubble? No; Ultrasound enhancing contrast? No    Interpretation Summary    Left Ventricle: The left ventricle is normal in size. Mildly increased wall thickness. There is mild concentric hypertrophy. There is normal systolic function with a visually estimated ejection fraction of 55 - 60%. Grade I diastolic dysfunction.    Right Ventricle: Normal right ventricular cavity size. Systolic function is normal.    Tricuspid Valve: There is mild regurgitation.    Pulmonary Artery: The estimated pulmonary artery systolic pressure is 53 mmHg.        #DM  Lab Results   Component Value Date    HGBA1C 6.3 (H) 09/26/2024         Review of Systems   Constitutional:  Positive for fever. Negative for chills.   Eyes:  Negative for photophobia.   Respiratory:  Negative for cough and hemoptysis.    Cardiovascular:  Positive for leg swelling. Negative for chest pain, palpitations and orthopnea.   Gastrointestinal:  Negative for constipation, diarrhea, nausea and vomiting.  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, without long-term current use of insulin     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, without long-term current use of insulin     Depression     Diabetes mellitus, type 2     Eye injury as a child     k-abrasion  od    Hyperlipidemia     Hypertension     Hypothyroidism     Immune deficiency disorder     Immune disorder     LOC (loss of consciousness) 03/12/2021    at home    Myalgia and myositis 09/06/2012    Osteoporosis     Polyneuropathy     Pulmonary embolism 07/10/2021    Renal manifestation of secondary diabetes mellitus      Sarcoidosis     Seizure     Type 2 diabetes mellitus     Ulcer     no cancer    Urinary incontinence       Past Surgical History:   Procedure Laterality Date    CARPAL TUNNEL RELEASE      Rt wrist    CATARACT EXTRACTION W/  INTRAOCULAR LENS IMPLANT Right 2015    Dr. Azevedo    CATARACT EXTRACTION W/  INTRAOCULAR LENS IMPLANT Left 2015    Dr. Azevedo    CERVICAL SPINE SURGERY       SECTION      CHOLECYSTECTOMY      INJECTION OF ANESTHETIC AGENT AROUND NERVE Left 2020    Procedure: BLOCK, NERVE LEFT FEMORAL AND OBTURATOR;  Surgeon: Alfonso Richards MD;  Location: [facility];  Service: Pain Management;  Laterality: Left;  NEEDS CONSENT    INJECTION OF ANESTHETIC AGENT AROUND NERVE Bilateral 10/09/2023    Procedure: BLOCK, NERVE, BILATERAL L3-L4-L5 MEDIAL BRANCH;  Surgeon: Alfonso Richards MD;  Location: [facility];  Service: Pain Management;  Laterality: Bilateral;    INJECTION OF JOINT Left 2019    Procedure: Injection, Joint  fLUOROSCOPIC jOINT iNJECTION (hIP iNJECTION) LEFT ROCH BURSA AS WELL LEFT TROCHANTERIC BURSA;  Surgeon: Alfonso Richards MD;  Location: [facility];  Service: Pain Management;  Laterality: Left;  NEEDS CONSENT, DIABETIC    INJECTION OF JOINT Left 2019    Procedure: Injection, Joint FLUOROSCOPIC JOINT INJECTION (HIP INJECTION) LEFT HIP;  Surgeon: Alfonso Richards MD;  Location: [facility];  Service: Pain Management;  Laterality: Left;  NEEDS CONSENT    INJECTION OF JOINT Left 2019    Procedure: INJECTION, JOINT;  Surgeon: Alfonso Richards MD;  Location: [facility];  Service: Pain Management;  Laterality: Left;  Left Hip and Left GTB Injections    INJECTION OF JOINT Left 2020    Procedure: INJECTION, JOINT, LEFT HIP and LEFT GREATER TROCHANTERIC BURSA;  Surgeon: Alfonso Richards MD;  Location: [facility];  Service: Pain Management;  Laterality: Left;  INJECTION, JOINT, LEFT HIP and LEFT GREATER TROCHANTERIC BURSA    INJECTION OF JOINT Left  09/03/2020    Procedure: INJECTION, JOINT, LEFT SI;  Surgeon: Alfonso Richards MD;  Location: [facility];  Service: Pain Management;  Laterality: Left;  INJECTION, JOINT, LEFT SI    TRANSFORAMINAL EPIDURAL INJECTION OF STEROID Bilateral 12/05/2019    Procedure: INJECTION, STEROID, EPIDURAL, TRANSFORAMINAL APPROACH;  Surgeon: Alfonso Richards MD;  Location: [facility];  Service: Pain Management;  Laterality: Bilateral;  B/L TF RHIANNA L5  Consent Needed    TRANSFORAMINAL EPIDURAL INJECTION OF STEROID Bilateral 06/29/2020    Procedure: INJECTION, STEROID, EPIDURAL, TRANSFORAMINAL APPROACH L5/S1;  Surgeon: Alfonso Richards MD;  Location: [facility];  Service: Pain Management;  Laterality: Bilateral;  B/L TF RHIANNA L5/S1    TUBAL LIGATION          Current Outpatient Medications:     ACCU-CHEK FASTCLIX LANCING DEV Kit, USE AS DIRECTED., Disp: 1 each, Rfl: 0    apixaban (ELIQUIS) 5 mg Tab, Take 1 tablet (5 mg total) by mouth 2 (two) times daily. Start this prescription after finishing starter pack, Disp: 60 tablet, Rfl: 5    atorvastatin (LIPITOR) 20 MG tablet, Take 1 tablet (20 mg total) by mouth once daily., Disp: 90 tablet, Rfl: 1    blood sugar diagnostic (ACCU-CHEK SMARTVIEW TEST STRIP) Strp, Use as directed to check blood sugar twice daily., Disp: 200 strip, Rfl: 3    blood sugar diagnostic Strp, To check BG three times daily, to use with insurance preferred meter, Disp: 300 each, Rfl: 3    blood-glucose meter kit, Use as instructed, Disp: 1 each, Rfl: 0    carvediloL (COREG) 25 MG tablet, Take 1 tablet (25 mg total) by mouth 2 (two) times daily., Disp: 180 tablet, Rfl: 3    cetirizine (ZYRTEC) 10 MG tablet, Take 1 tablet (10 mg total) by mouth once daily., Disp: 30 tablet, Rfl: 3    diclofenac sodium (VOLTAREN) 1 % Gel, Apply 2 g topically once daily., Disp: 100 g, Rfl: 3    DULoxetine (CYMBALTA) 60 MG capsule, Take 1 capsule (60 mg total) by mouth once daily., Disp: 90 capsule, Rfl: 1    fenofibrate 160 MG Tab, Take 1  "tablet by mouth once daily, Disp: 90 tablet, Rfl: 1    folic acid (FOLVITE) 1 MG tablet, Take 1 tablet (1,000 mcg total) by mouth once daily., Disp: 90 tablet, Rfl: 1    insulin glargine U-100, Lantus, (LANTUS SOLOSTAR U-100 INSULIN) 100 unit/mL (3 mL) InPn pen, Inject 20 Units into the skin every evening., Disp: 18 mL, Rfl: 1    levETIRAcetam (KEPPRA) 750 MG Tab, Take 1 tablet (750 mg total) by mouth 2 (two) times daily., Disp: 60 tablet, Rfl: 11    levothyroxine (SYNTHROID) 50 MCG tablet, Take 1 tablet (50 mcg total) by mouth before breakfast., Disp: 90 tablet, Rfl: 3    linaCLOtide (LINZESS) 72 mcg Cap capsule, TAKE 1 CAPSULE BY MOUTH ONCE DAILY BEFORE BREAKFAST, Disp: 90 capsule, Rfl: 1    NIFEdipine (PROCARDIA-XL) 30 MG (OSM) 24 hr tablet, Take 1 tablet (30 mg total) by mouth once daily., Disp: 90 tablet, Rfl: 1    NIFEdipine (PROCARDIA-XL) 90 MG (OSM) 24 hr tablet, Take 1 tablet (90 mg total) by mouth once daily., Disp: 90 tablet, Rfl: 1    pantoprazole (PROTONIX) 40 MG tablet, Take 1 tablet (40 mg total) by mouth once daily., Disp: 90 tablet, Rfl: 3    pen needle, diabetic (BD ULTRA-FINE ANA PEN NEEDLE) 32 gauge x 5/32" Ndle, For use with insulin pen, Disp: 100 each, Rfl: 3    predniSONE (DELTASONE) 1 MG tablet, TAKE 4 TABLETS BY MOUTH ONCE DAILY FOR  ONE  MONTH  THEN  DECREASE  BY  1  TABLET  EVERY  MONTH  IF  LABS  ALLOW, Disp: 120 tablet, Rfl: 2    pregabalin (LYRICA) 75 MG capsule, Take 1 capsule (75 mg total) by mouth 2 (two) times daily., Disp: 60 capsule, Rfl: 5    promethazine-dextromethorphan (PROMETHAZINE-DM) 6.25-15 mg/5 mL Syrp, Take one tsp po q 6 hrs prn cough, Disp: 180 mL, Rfl: 0    tiZANidine (ZANAFLEX) 2 MG tablet, Take 2 tablets (4 mg total) by mouth every 8 (eight) hours as needed (muscle spasm)., Disp: 270 tablet, Rfl: 1    triazolam (HALCION) 0.25 MG Tab, , Disp: , Rfl:     albuterol-ipratropium (DUO-NEB) 2.5 mg-0.5 mg/3 mL nebulizer solution, Take 3 mLs by nebulization every 4 (four) " hours as needed for Wheezing or Shortness of Breath. Rescue, Disp: 90 each, Rfl: 11    chlorthalidone (HYGROTEN) 25 MG Tab, Take 1 tablet (25 mg total) by mouth once daily., Disp: 30 tablet, Rfl: 11    fluticasone propion-salmeterol 115-21 mcg/dose (ADVAIR HFA) 115-21 mcg/actuation HFAA inhaler, Inhale 2 puffs into the lungs every 12 (twelve) hours. Controller, Disp: 12 g, Rfl: 3  Review of patient's allergies indicates:   Allergen Reactions    Azathioprine Shortness Of Breath and Other (See Comments)     Fatigue      Social History     Tobacco Use    Smoking status: Never     Passive exposure: Never    Smokeless tobacco: Never   Substance Use Topics    Alcohol use: No      Family History   Problem Relation Name Age of Onset    Hypertension Mother Martial     Cataracts Mother Martial     No Known Problems Father      Hypertension Maternal Grandmother Nora     Glaucoma Sister Cristina     Arthritis Sister Cristina     No Known Problems Brother Kofi     No Known Problems Maternal Aunt      No Known Problems Maternal Uncle      No Known Problems Paternal Aunt      No Known Problems Paternal Uncle      No Known Problems Maternal Grandfather      No Known Problems Paternal Grandmother      No Known Problems Paternal Grandfather      Kidney failure Sister Rita     Hepatitis Sister Deepali     Cancer Sister Deepali         bone cancer     Immunodeficiency Sister Christiana     Diabetes Son x4     Hypertension Son x4     Lupus Neg Hx      Rheum arthritis Neg Hx      Amblyopia Neg Hx      Blindness Neg Hx      Macular degeneration Neg Hx      Retinal detachment Neg Hx      Strabismus Neg Hx      Stroke Neg Hx      Thyroid disease Neg Hx      Endometrial cancer Neg Hx      Vaginal cancer Neg Hx      Cervical cancer Neg Hx          Physical Exam :  Vitals:    10/03/24 1306   BP: (!) 179/84   Pulse: 65     Physical Exam  Constitutional:       General: She is not in acute distress.     Appearance: She is not ill-appearing.   HENT:       Head: Normocephalic and atraumatic.   Eyes:      Extraocular Movements: Extraocular movements intact.      Pupils: Pupils are equal, round, and reactive to light.   Cardiovascular:      Rate and Rhythm: Normal rate.   Pulmonary:      Effort: No respiratory distress.      Breath sounds: No wheezing or rales.   Abdominal:      General: There is distension.      Tenderness: There is no abdominal tenderness. There is no guarding.   Musculoskeletal:      Right lower leg: Edema present.      Left lower leg: Edema present.   Skin:     Coloration: Skin is not jaundiced.   Neurological:      Mental Status: She is alert.   Psychiatric:         Mood and Affect: Mood normal.         Behavior: Behavior normal.       Assessment:    1. Stage 3a chronic kidney disease    2. CKD (chronic kidney disease) stage 1, GFR 90 ml/min or greater      3. Hypertension  4. Sarcoidosis  5. Arthropathy  6- myopathy  7- chronic iron deficiency anemia  8- Zoroastrian      Plan:      - Recent serum creatinine 1.2, estimated GFR 46.3, sortie 134, potassium 3.5, CO2 25, normal phosphorus level  - Obtain new urine protein creatinine ratio  - retroperitoneal ultrasound  - Obtain APOL1 gene test  - Abdominal ultrasound follow-up with her abdominal distention (ascites)?  - Hepatitis panel ordered  - Educated and counseled about lifestyle modification, diet, medication compliance, salt resriction  - Blood pressure 179/86 mmHg, strict blood pressure control, currently on nifedipine 90 mg in the morning, 30 mg the evening, the evening dose switched with chlorthalidone 25 mg        Orders Placed This Encounter   Procedures    US Retroperitoneal Complete    US Abdomen Complete    Protein / creatinine ratio, urine    Urinalysis    APOL1 Genotyping    HEPATITIS C ANTIBODY    Hepatitis B Surface Antigen     There are no discontinued medications.   Future Appointments   Date Time Provider Department Center   10/7/2024 11:00 AM [facility]-US1 MASTER [facility]  ULTR [facility]   10/7/2024 11:45 AM [facility]-US1 MASTER [facility] ULTR [facility]   10/10/2024  1:20 PM Micaela Mendoza MD [facility] - B   10/18/2024  8:05 AM LAB,  HOSPITAL [facility] LAB [facility] [place]   10/18/2024 11:15 AM INJECTION, [facility]US[facility] [place]   11/1/2024  8:40 AM LAB,  HOSPITAL [facility] [place]   11/1/2024 11:15 AM INJECTION, [facility] [place]   11/6/2024  9:30 AM Josh Bah MD [facility] CARDIO [place]   11/15/2024  8:20 AM LAB,  HOSPITAL [facility] [place]   11/15/2024 11:15 AM INJECTION, [facility] [place]   11/29/2024  8:15 AM LAB, [facility] [place]   11/29/2024 11:15 AM INJECTION, [facility] [place]   12/13/2024  8:10 AM LAB, [facility] [place]   12/13/2024 11:30 AM INJECTION, [facility] [place]   12/18/2024  2:00 PM Edith Pacheco MD [facility] HEM ONC [facility]   12/27/2024  8:20 AM LAB, [facility] WB LAB [facility] [place]   1/10/2025  8:10 AM LAB, [facility] [place]   1/24/2025  8:30 AM LAB, [facility] LAB [facility] [place]

## 2024-10-07 ENCOUNTER — HOSPITAL ENCOUNTER (OUTPATIENT)
Dept: RADIOLOGY | Facility: HOSPITAL | Age: 79
Discharge: HOME | End: 2024-10-07
Attending: STUDENT IN AN ORGANIZED HEALTH CARE EDUCATION/TRAINING PROGRAM
Payer: MEDICARE

## 2024-10-07 DIAGNOSIS — N18.31 STAGE 3A CHRONIC KIDNEY DISEASE: ICD-10-CM

## 2024-10-07 PROCEDURE — 76700 US EXAM ABDOM COMPLETE: CPT | Mod: TC,HCNC

## 2024-10-07 PROCEDURE — 76770 US EXAM ABDO BACK WALL COMP: CPT | Mod: TC,HCNC

## 2024-10-07 PROCEDURE — 76770 US EXAM ABDO BACK WALL COMP: CPT | Mod: 26,HCNC,, | Performed by: RADIOLOGY

## 2024-10-07 NOTE — PROGRESS NOTES
I have reviewed the notes, assessments, and/or procedures performed by Dr. Roca and  I concur with her/his documentation of Regino [facility].  Date of Service: 10/3/2024    Stage 3a chronic kidney disease with serum creatinine 1.2-1.3 mg/dl secondary to long standing HTN. Now with worsening abdominal distention and lower extremity edema     Would get repeat UPCR, ECHO, BNP US abdomen and kidney to evaluate the cause of ascitics     APOL1 genetic testing ordered     Switched Amlodipine to chlorthalidone to help with lower extremity edema

## 2024-10-10 ENCOUNTER — OFFICE VISIT (OUTPATIENT)
Dept: FAMILY MEDICINE | Facility: CLINIC | Age: 79
End: 2024-10-10
Payer: MEDICARE

## 2024-10-10 VITALS
DIASTOLIC BLOOD PRESSURE: 76 MMHG | WEIGHT: 157 LBS | HEART RATE: 105 BPM | RESPIRATION RATE: 17 BRPM | SYSTOLIC BLOOD PRESSURE: 132 MMHG | OXYGEN SATURATION: 97 % | BODY MASS INDEX: 28.89 KG/M2 | HEIGHT: 62 IN

## 2024-10-10 DIAGNOSIS — M54.42 CHRONIC BILATERAL LOW BACK PAIN WITH LEFT-SIDED SCIATICA: ICD-10-CM

## 2024-10-10 DIAGNOSIS — E11.44 CONTROLLED TYPE 2 DIABETES MELLITUS WITH DIABETIC AMYOTROPHY, WITH LONG-TERM CURRENT USE OF INSULIN: Primary | ICD-10-CM

## 2024-10-10 DIAGNOSIS — G72.9 MYOPATHY: ICD-10-CM

## 2024-10-10 DIAGNOSIS — Z79.4 CONTROLLED TYPE 2 DIABETES MELLITUS WITH DIABETIC AMYOTROPHY, WITH LONG-TERM CURRENT USE OF INSULIN: Primary | ICD-10-CM

## 2024-10-10 DIAGNOSIS — G89.29 CHRONIC BILATERAL LOW BACK PAIN WITH LEFT-SIDED SCIATICA: ICD-10-CM

## 2024-10-10 DIAGNOSIS — R14.0 BLOATING: ICD-10-CM

## 2024-10-10 DIAGNOSIS — R73.9 HYPERGLYCEMIA: ICD-10-CM

## 2024-10-10 DIAGNOSIS — D86.9 SARCOIDOSIS: ICD-10-CM

## 2024-10-10 PROCEDURE — 3072F LOW RISK FOR RETINOPATHY: CPT | Mod: HCNC,CPTII,S$GLB, | Performed by: FAMILY MEDICINE

## 2024-10-10 PROCEDURE — 99999 PR PBB SHADOW E&M-EST. PATIENT-LVL IV: CPT | Mod: PBBFAC,HCNC,, | Performed by: FAMILY MEDICINE

## 2024-10-10 PROCEDURE — 1125F AMNT PAIN NOTED PAIN PRSNT: CPT | Mod: HCNC,CPTII,S$GLB, | Performed by: FAMILY MEDICINE

## 2024-10-10 PROCEDURE — 3078F DIAST BP <80 MM HG: CPT | Mod: HCNC,CPTII,S$GLB, | Performed by: FAMILY MEDICINE

## 2024-10-10 PROCEDURE — 1159F MED LIST DOCD IN RCRD: CPT | Mod: HCNC,CPTII,S$GLB, | Performed by: FAMILY MEDICINE

## 2024-10-10 PROCEDURE — 3075F SYST BP GE 130 - 139MM HG: CPT | Mod: HCNC,CPTII,S$GLB, | Performed by: FAMILY MEDICINE

## 2024-10-10 PROCEDURE — 1157F ADVNC CARE PLAN IN RCRD: CPT | Mod: HCNC,CPTII,S$GLB, | Performed by: FAMILY MEDICINE

## 2024-10-10 PROCEDURE — 3288F FALL RISK ASSESSMENT DOCD: CPT | Mod: HCNC,CPTII,S$GLB, | Performed by: FAMILY MEDICINE

## 2024-10-10 PROCEDURE — 1101F PT FALLS ASSESS-DOCD LE1/YR: CPT | Mod: HCNC,CPTII,S$GLB, | Performed by: FAMILY MEDICINE

## 2024-10-10 PROCEDURE — 99215 OFFICE O/P EST HI 40 MIN: CPT | Mod: HCNC,S$GLB,, | Performed by: FAMILY MEDICINE

## 2024-10-10 RX ORDER — INSULIN GLARGINE 100 [IU]/ML
30 INJECTION, SOLUTION SUBCUTANEOUS 2 TIMES DAILY
Qty: 54 ML | Refills: 1 | Status: SHIPPED | OUTPATIENT
Start: 2024-10-10 | End: 2025-04-08

## 2024-10-10 NOTE — PROGRESS NOTES
Chief Complaint   Patient presents with    Follow-up       HPI  Regino Lawrence is a 78 y.o. female with multiple medical diagnoses as listed in the medical history and problem list that presents for evaluation for chronic conditions    HTN- blood pressure has improved with starting chlorthalidone;  strict blood pressure control, currently on nifedipine 90 mg in the morning, 30 mg the evening, the evening dose switched with chlorthalidone 25 mg    Diabetes- sugars have been running as high as 200-400 and she has been taking the insulin 3 times daily    3. Abdominal distension and bloating- pain in her abdomen radiating to back, she does take linzess,US negative for urinary retention, reports bloating and gas with meals, taking protonix    4. Leg pain and muscle spasms- has reported muscles tightening up in her legs with pictures, advised to reach out to neurology, I have sent Epic message in hopes of getting a sooner appointment, she has pain in her right leg and toes, limiting mobility, hx of DDD on MRI but did not tolerate RFA ablation, we have tried opioids in the past but discontinued due to her falling and hitting her head      ALLERGIES AND MEDICATIONS: updated and reviewed.  Review of patient's allergies indicates:   Allergen Reactions    Azathioprine Shortness Of Breath and Other (See Comments)     Fatigue     Medication List with Changes/Refills   Current Medications    ACCU-CHEK FASTCLIX LANCING DEV KIT    USE AS DIRECTED.    ALBUTEROL-IPRATROPIUM (DUO-NEB) 2.5 MG-0.5 MG/3 ML NEBULIZER SOLUTION    Take 3 mLs by nebulization every 4 (four) hours as needed for Wheezing or Shortness of Breath. Rescue    APIXABAN (ELIQUIS) 5 MG TAB    Take 1 tablet (5 mg total) by mouth 2 (two) times daily. Start this prescription after finishing starter pack    ATORVASTATIN (LIPITOR) 20 MG TABLET    Take 1 tablet (20 mg total) by mouth once daily.    BLOOD SUGAR DIAGNOSTIC (ACCU-CHEK SMARTVIEW TEST STRIP) STRP    Use as  "directed to check blood sugar twice daily.    BLOOD SUGAR DIAGNOSTIC STRP    To check BG three times daily, to use with insurance preferred meter    BLOOD-GLUCOSE METER KIT    Use as instructed    CARVEDILOL (COREG) 25 MG TABLET    Take 1 tablet (25 mg total) by mouth 2 (two) times daily.    CETIRIZINE (ZYRTEC) 10 MG TABLET    Take 1 tablet (10 mg total) by mouth once daily.    CHLORTHALIDONE (HYGROTEN) 25 MG TAB    Take 1 tablet (25 mg total) by mouth once daily.    DICLOFENAC SODIUM (VOLTAREN) 1 % GEL    Apply 2 g topically once daily.    DULOXETINE (CYMBALTA) 60 MG CAPSULE    Take 1 capsule (60 mg total) by mouth once daily.    FENOFIBRATE 160 MG TAB    Take 1 tablet by mouth once daily    FLUTICASONE PROPION-SALMETEROL 115-21 MCG/DOSE (ADVAIR HFA) 115-21 MCG/ACTUATION HFAA INHALER    Inhale 2 puffs into the lungs every 12 (twelve) hours. Controller    FOLIC ACID (FOLVITE) 1 MG TABLET    Take 1 tablet (1,000 mcg total) by mouth once daily.    LEVETIRACETAM (KEPPRA) 750 MG TAB    Take 1 tablet (750 mg total) by mouth 2 (two) times daily.    LEVOTHYROXINE (SYNTHROID) 50 MCG TABLET    Take 1 tablet (50 mcg total) by mouth before breakfast.    LINACLOTIDE (LINZESS) 72 MCG CAP CAPSULE    TAKE 1 CAPSULE BY MOUTH ONCE DAILY BEFORE BREAKFAST    NIFEDIPINE (PROCARDIA-XL) 90 MG (OSM) 24 HR TABLET    Take 1 tablet (90 mg total) by mouth once daily.    PANTOPRAZOLE (PROTONIX) 40 MG TABLET    Take 1 tablet (40 mg total) by mouth once daily.    PEN NEEDLE, DIABETIC (BD ULTRA-FINE ANA PEN NEEDLE) 32 GAUGE X 5/32" NDLE    For use with insulin pen    PREDNISONE (DELTASONE) 1 MG TABLET    TAKE 4 TABLETS BY MOUTH ONCE DAILY FOR  ONE  MONTH  THEN  DECREASE  BY  1  TABLET  EVERY  MONTH  IF  LABS  ALLOW    PREGABALIN (LYRICA) 75 MG CAPSULE    Take 1 capsule (75 mg total) by mouth 2 (two) times daily.    PROMETHAZINE-DEXTROMETHORPHAN (PROMETHAZINE-DM) 6.25-15 MG/5 ML SYRP    Take one tsp po q 6 hrs prn cough    TIZANIDINE (ZANAFLEX) 2 " "MG TABLET    Take 2 tablets (4 mg total) by mouth every 8 (eight) hours as needed (muscle spasm).    TRIAZOLAM (HALCION) 0.25 MG TAB       Changed and/or Refilled Medications    Modified Medication Previous Medication    INSULIN GLARGINE U-100, LANTUS, (LANTUS SOLOSTAR U-100 INSULIN) 100 UNIT/ML (3 ML) INPN PEN insulin glargine U-100, Lantus, (LANTUS SOLOSTAR U-100 INSULIN) 100 unit/mL (3 mL) InPn pen       Inject 30 Units into the skin 2 (two) times a day.    Inject 20 Units into the skin every evening.   Discontinued Medications    NIFEDIPINE (PROCARDIA-XL) 30 MG (OSM) 24 HR TABLET    Take 1 tablet (30 mg total) by mouth once daily.       ROS  Review of Systems   Constitutional:  Negative for chills, diaphoresis, fatigue, fever and unexpected weight change.   HENT:  Negative for rhinorrhea, sinus pressure, sore throat and tinnitus.    Eyes:  Negative for photophobia and visual disturbance.   Respiratory:  Negative for cough, shortness of breath and wheezing.    Cardiovascular:  Negative for chest pain and palpitations.   Gastrointestinal:  Positive for abdominal distention and abdominal pain. Negative for blood in stool, constipation, diarrhea, nausea and vomiting.   Genitourinary:  Negative for dysuria, flank pain, frequency and vaginal discharge.   Musculoskeletal:  Positive for arthralgias and myalgias. Negative for joint swelling.   Skin:  Negative for rash.   Neurological:  Negative for speech difficulty, weakness, light-headedness and headaches.   Psychiatric/Behavioral:  Negative for behavioral problems and dysphoric mood.        Physical Exam  Vitals:    10/10/24 1329   BP: 132/76   Pulse: 105   Resp: 17   SpO2: 97%   Weight: 71.2 kg (157 lb)   Height: 5' 2" (1.575 m)    Body mass index is 28.72 kg/m².  Weight: 71.2 kg (157 lb)   Height: 5' 2" (157.5 cm)     Physical Exam  Vitals and nursing note reviewed.   Constitutional:       Appearance: She is well-developed.   Skin:     General: Skin is warm and dry. "      Findings: No erythema or rash.   Neurological:      Mental Status: She is alert. Mental status is at baseline.   Psychiatric:         Behavior: Behavior normal.         Health Maintenance         Date Due Completion Date    Shingles Vaccine (1 of 2) 07/20/2015 5/25/2015    RSV Vaccine (Age 60+ and Pregnant patients) (1 - 1-dose 75+ series) Never done ---    Lipid Panel 12/09/2023 12/9/2022    Influenza Vaccine (1) 09/01/2024 11/3/2023    Override on 8/8/2018: Done    Override on 9/27/2017: Done    COVID-19 Vaccine (6 - 2024-25 season) 09/01/2024 11/3/2023    Diabetes Urine Screening 09/08/2024 9/8/2023    Eye Exam 10/25/2024 10/25/2023    DEXA Scan 11/07/2024 11/7/2022    Hemoglobin A1c 03/26/2025 9/26/2024    TETANUS VACCINE 05/16/2026 5/16/2016            Health maintenance reviewed and addressed as ordered      ASSESSMENT/PLAN       1. Hyperglycemia  Check A1c in 3 mos  Increase lantus to 30 units BID  - Hemoglobin A1C; Future    2. 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  Dispense: 54 mL; Refill: 1    3. Sarcoidosis  On prednisone  F/u with rheumatology    4. Myopathy  Recommend she notify neurology to see if further evaluation is needed  I have also sent epic message  - Ambulatory referral/consult to Home Health; Future    5. Chronic bilateral low back pain with left-sided sciatica  Refer for HH with PT as she is having trouble with balance and ambulation  - Ambulatory referral/consult to Home Health; Future    6. Bloating  On PPI with abdominal distension   May benefit from motility evaluation  On linzess for constipation  - Ambulatory referral/consult to Gastroenterology; Future        Micaela Mendoza MD  10/10/2024 1:59 PM        Follow up in about 3 months (around  1/10/2025).    Orders Placed This Encounter   Procedures    Hemoglobin A1C    Ambulatory referral/consult to Home Health    Ambulatory referral/consult to Gastroenterology

## 2024-10-11 ENCOUNTER — PATIENT OUTREACH (OUTPATIENT)
Dept: ADMINISTRATIVE | Facility: HOSPITAL | Age: 79
End: 2024-10-11
Payer: MEDICARE

## 2024-10-11 DIAGNOSIS — E11.9 DM TYPE 2 WITHOUT RETINOPATHY: ICD-10-CM

## 2024-10-11 DIAGNOSIS — M81.0 POSTMENOPAUSAL OSTEOPOROSIS OF MULTIPLE SITES: Primary | ICD-10-CM

## 2024-10-11 NOTE — PROGRESS NOTES
HTN Digital [facility] - Pt scheduled 10/17 @ [facility]. Lab linked to appointment scheduled for 01/24/25. Gap report updated. Immunization's updated/triggered.

## 2024-10-18 ENCOUNTER — LAB VISIT (OUTPATIENT)
Dept: LAB | Facility: HOSPITAL | Age: 79
End: 2024-10-18
Attending: INTERNAL MEDICINE
Payer: MEDICARE

## 2024-10-18 DIAGNOSIS — N18.30 ANEMIA IN STAGE 3 CHRONIC KIDNEY DISEASE: ICD-10-CM

## 2024-10-18 DIAGNOSIS — D63.1 ANEMIA IN STAGE 3 CHRONIC KIDNEY DISEASE: ICD-10-CM

## 2024-10-18 LAB — HGB BLD-MCNC: 11.3 G/DL (ref 12–16)

## 2024-10-18 PROCEDURE — 85018 HEMOGLOBIN: CPT | Mod: HCNC | Performed by: INTERNAL MEDICINE

## 2024-10-18 PROCEDURE — 36415 COLL VENOUS BLD VENIPUNCTURE: CPT | Mod: HCNC | Performed by: INTERNAL MEDICINE

## 2024-11-01 ENCOUNTER — LAB VISIT (OUTPATIENT)
Dept: LAB | Facility: HOSPITAL | Age: 79
End: 2024-11-01
Attending: INTERNAL MEDICINE
Payer: MEDICARE

## 2024-11-01 ENCOUNTER — INFUSION (OUTPATIENT)
Dept: INFUSION THERAPY | Facility: HOSPITAL | Age: 79
End: 2024-11-01
Attending: INTERNAL MEDICINE
Payer: MEDICARE

## 2024-11-01 VITALS
TEMPERATURE: 98 F | DIASTOLIC BLOOD PRESSURE: 72 MMHG | OXYGEN SATURATION: 99 % | SYSTOLIC BLOOD PRESSURE: 143 MMHG | HEART RATE: 94 BPM | RESPIRATION RATE: 18 BRPM

## 2024-11-01 DIAGNOSIS — N18.30 ANEMIA IN STAGE 3 CHRONIC KIDNEY DISEASE: ICD-10-CM

## 2024-11-01 DIAGNOSIS — D63.1 ANEMIA IN CHRONIC KIDNEY DISEASE, UNSPECIFIED CKD STAGE: ICD-10-CM

## 2024-11-01 DIAGNOSIS — N18.30 ANEMIA IN STAGE 3 CHRONIC KIDNEY DISEASE: Primary | ICD-10-CM

## 2024-11-01 DIAGNOSIS — N18.9 ANEMIA IN CHRONIC KIDNEY DISEASE, UNSPECIFIED CKD STAGE: ICD-10-CM

## 2024-11-01 DIAGNOSIS — D50.9 IRON DEFICIENCY ANEMIA, UNSPECIFIED IRON DEFICIENCY ANEMIA TYPE: ICD-10-CM

## 2024-11-01 DIAGNOSIS — D63.1 ANEMIA IN STAGE 3 CHRONIC KIDNEY DISEASE: ICD-10-CM

## 2024-11-01 DIAGNOSIS — N18.30 ANEMIA IN STAGE 3 CHRONIC KIDNEY DISEASE, UNSPECIFIED WHETHER STAGE 3A OR 3B CKD: ICD-10-CM

## 2024-11-01 DIAGNOSIS — D63.1 ANEMIA IN STAGE 3 CHRONIC KIDNEY DISEASE: Primary | ICD-10-CM

## 2024-11-01 DIAGNOSIS — D63.1 ANEMIA IN STAGE 3 CHRONIC KIDNEY DISEASE, UNSPECIFIED WHETHER STAGE 3A OR 3B CKD: ICD-10-CM

## 2024-11-01 LAB — HGB BLD-MCNC: 10.5 G/DL (ref 12–16)

## 2024-11-01 PROCEDURE — 36415 COLL VENOUS BLD VENIPUNCTURE: CPT | Mod: HCNC | Performed by: INTERNAL MEDICINE

## 2024-11-01 PROCEDURE — 85018 HEMOGLOBIN: CPT | Mod: HCNC | Performed by: INTERNAL MEDICINE

## 2024-11-01 PROCEDURE — 63600175 PHARM REV CODE 636 W HCPCS: Mod: JZ,EC,JG,HCNC | Performed by: INTERNAL MEDICINE

## 2024-11-01 RX ADMIN — EPOETIN ALFA-EPBX 40000 UNITS: 40000 INJECTION, SOLUTION INTRAVENOUS; SUBCUTANEOUS at 12:11

## 2024-11-04 ENCOUNTER — HOSPITAL ENCOUNTER (OUTPATIENT)
Dept: RADIOLOGY | Facility: HOSPITAL | Age: 79
Discharge: HOME | End: 2024-11-04
Attending: INTERNAL MEDICINE
Payer: MEDICARE

## 2024-11-04 DIAGNOSIS — R56.9 CONVULSIONS, UNSPECIFIED CONVULSION TYPE: ICD-10-CM

## 2024-11-04 PROCEDURE — 25500020 PHARM REV CODE 255: Mod: HCNC | Performed by: INTERNAL MEDICINE

## 2024-11-04 PROCEDURE — A9585 GADOBUTROL INJECTION: HCPCS | Mod: HCNC | Performed by: INTERNAL MEDICINE

## 2024-11-04 PROCEDURE — 70553 MRI BRAIN STEM W/O & W/DYE: CPT | Mod: 26,HCNC,, | Performed by: RADIOLOGY

## 2024-11-04 PROCEDURE — 70553 MRI BRAIN STEM W/O & W/DYE: CPT | Mod: TC,HCNC

## 2024-11-04 RX ORDER — GADOBUTROL 604.72 MG/ML
7 INJECTION INTRAVENOUS
Status: COMPLETED | OUTPATIENT
Start: 2024-11-04 | End: 2024-11-04

## 2024-11-04 RX ADMIN — GADOBUTROL 7 ML: 604.72 INJECTION INTRAVENOUS at 04:11

## 2024-11-06 ENCOUNTER — OFFICE VISIT (OUTPATIENT)
Dept: CARDIOLOGY | Facility: CLINIC | Age: 79
End: 2024-11-06
Payer: MEDICARE

## 2024-11-06 VITALS
OXYGEN SATURATION: 94 % | HEART RATE: 96 BPM | BODY MASS INDEX: 28.88 KG/M2 | SYSTOLIC BLOOD PRESSURE: 100 MMHG | HEIGHT: 62 IN | DIASTOLIC BLOOD PRESSURE: 50 MMHG | WEIGHT: 156.94 LBS

## 2024-11-06 DIAGNOSIS — R07.89 CHEST PAIN, ATYPICAL: Primary | ICD-10-CM

## 2024-11-06 DIAGNOSIS — I70.0 AORTIC ATHEROSCLEROSIS: ICD-10-CM

## 2024-11-06 DIAGNOSIS — E11.69 COMBINED HYPERLIPIDEMIA ASSOCIATED WITH TYPE 2 DIABETES MELLITUS: Chronic | ICD-10-CM

## 2024-11-06 DIAGNOSIS — R06.09 DOE (DYSPNEA ON EXERTION): ICD-10-CM

## 2024-11-06 DIAGNOSIS — E78.2 COMBINED HYPERLIPIDEMIA ASSOCIATED WITH TYPE 2 DIABETES MELLITUS: Chronic | ICD-10-CM

## 2024-11-06 DIAGNOSIS — I27.20 PULMONARY HYPERTENSION, UNSPECIFIED: ICD-10-CM

## 2024-11-06 DIAGNOSIS — I10 ESSENTIAL HYPERTENSION: Chronic | ICD-10-CM

## 2024-11-06 PROCEDURE — 1157F ADVNC CARE PLAN IN RCRD: CPT | Mod: HCNC,CPTII,S$GLB, | Performed by: INTERNAL MEDICINE

## 2024-11-06 PROCEDURE — 3078F DIAST BP <80 MM HG: CPT | Mod: HCNC,CPTII,S$GLB, | Performed by: INTERNAL MEDICINE

## 2024-11-06 PROCEDURE — 99214 OFFICE O/P EST MOD 30 MIN: CPT | Mod: HCNC,S$GLB,, | Performed by: INTERNAL MEDICINE

## 2024-11-06 PROCEDURE — 1159F MED LIST DOCD IN RCRD: CPT | Mod: HCNC,CPTII,S$GLB, | Performed by: INTERNAL MEDICINE

## 2024-11-06 PROCEDURE — 1101F PT FALLS ASSESS-DOCD LE1/YR: CPT | Mod: HCNC,CPTII,S$GLB, | Performed by: INTERNAL MEDICINE

## 2024-11-06 PROCEDURE — 99999 PR PBB SHADOW E&M-EST. PATIENT-LVL III: CPT | Mod: PBBFAC,HCNC,, | Performed by: INTERNAL MEDICINE

## 2024-11-06 PROCEDURE — 3072F LOW RISK FOR RETINOPATHY: CPT | Mod: HCNC,CPTII,S$GLB, | Performed by: INTERNAL MEDICINE

## 2024-11-06 PROCEDURE — 3288F FALL RISK ASSESSMENT DOCD: CPT | Mod: HCNC,CPTII,S$GLB, | Performed by: INTERNAL MEDICINE

## 2024-11-06 PROCEDURE — 1126F AMNT PAIN NOTED NONE PRSNT: CPT | Mod: HCNC,CPTII,S$GLB, | Performed by: INTERNAL MEDICINE

## 2024-11-06 PROCEDURE — 3074F SYST BP LT 130 MM HG: CPT | Mod: HCNC,CPTII,S$GLB, | Performed by: INTERNAL MEDICINE

## 2024-11-06 NOTE — PROGRESS NOTES
Subjective   Patient ID:  Regino Lawrence is a 79 y.o. female who presents for follow-up of No chief complaint on file.      HPI      HTN, DM, HLD,  sarcoidosis, pulmonary HTN     Previously saw Dr Holden  HPI: She is schedule for gynecologic surgery on Monday. As part of her preoperative evaluation, an ECG was done which she was told was abnormal. I do not have the ECG to review. She has no cardiac history. Regino Lawrence denies any chest pain, shortness of breath, PND, orthopnea, palpitations, or syncope. Her ECG today is normal. All ECG's reviewed in EPIC were normal. She walks with a cane due to arthritis.     1. Controlled type 2 diabetes mellitus without complication, without long-term current use of insulin    2. Preop cardiovascular exam : She has 0 clinical risk factors according to the RCRI placing her at low risk for a perioperative cardiac complication. She has no symptoms suggestive of cardiopulmonary disease, and her ECG is normal. No further cardiac testing is required prior to her surgery. She should continue on her clonidine during the perioperative period to avoid rebound hypertension.   3. Essential hypertension       Echo 9/19/24    Left Ventricle: The left ventricle is normal in size. Mildly increased wall thickness. There is mild concentric hypertrophy. There is normal systolic function with a visually estimated ejection fraction of 55 - 60%. Grade I diastolic dysfunction.    Right Ventricle: Normal right ventricular cavity size. Systolic function is normal.    Tricuspid Valve: There is mild regurgitation.    Pulmonary Artery: The estimated pulmonary artery systolic pressure is 53 mmHg.        Stress test 10/2/24    Normal myocardial perfusion scan. There is no evidence of myocardial ischemia or infarction.    The gated perfusion images showed an ejection fraction of 71% at rest.    The ECG portion of the study is negative for ischemia.    The patient reported no chest pain during the stress  test.    There were no arrhythmias during stress.        4/3/19 Reports worsening FAUSTIN for several months - sometimes associated with chest tightness  EKG sinus tachycardia 104 otherwise ok  Denies prior CAD  Walks with a walker     5/1/19 Still with episodes of chest tightness - usually lasts all day  Not reproducible with palpation   CP atypical - likely musculoskeletal - will observe  OV 3 months     9/19/19 Having issues with hip pain  CP improved  Reports SOB with cough productive of clear mucus  EKG NSR PRWP - similar to previous EKG   Will give doxycycline for URI  Otherwise cardiac stable  OV 6 months      EKG 10/18/19 NSR - normal     10/24/19 Continues to report FAUSTIN   Denies CP  Scheduled to see pulmonary next week  Doubt FAUSTIN is cardiac  Agree with pulmonary evalution  Back to the ER if symptoms worsen     EKG 9/17/24 sinus tachycardia 107 otherwise ok      9/26/24 Recently reports having increased FAUSTIN and a dry cough, denies CP  BP controlled - mild tachycardia noted  Lexiscan myoview for recent worsening FAUSTIN, echo with normal EF and some worsening pulmonary HTN - suspect SOB is more pulmonary than cardiac  BNP ordered by PCP  Continue Rx for HTN, pulmonary HTN, HLD    BNP 12 (9/26/24)    11/6/24 occasional atypical CP in the afternoon. FAUSTIN about the same  BP controlled    Review of Systems   Constitutional: Negative for decreased appetite.   HENT:  Negative for ear discharge.    Eyes:  Negative for blurred vision.   Respiratory:  Negative for hemoptysis.    Endocrine: Negative for polyphagia.   Hematologic/Lymphatic: Negative for adenopathy.   Skin:  Negative for color change.   Musculoskeletal:  Negative for joint swelling.   Genitourinary:  Negative for bladder incontinence.   Neurological:  Negative for brief paralysis.   Psychiatric/Behavioral:  Negative for hallucinations.    Allergic/Immunologic: Negative for hives.          Objective     Physical Exam  Constitutional:       Appearance: She is  well-developed.   HENT:      Head: Normocephalic and atraumatic.   Eyes:      Conjunctiva/sclera: Conjunctivae normal.      Pupils: Pupils are equal, round, and reactive to light.   Cardiovascular:      Rate and Rhythm: Normal rate.      Pulses: Intact distal pulses.      Heart sounds: Normal heart sounds.   Pulmonary:      Effort: Pulmonary effort is normal.      Breath sounds: Normal breath sounds.   Abdominal:      General: Bowel sounds are normal.      Palpations: Abdomen is soft.   Musculoskeletal:         General: Normal range of motion.      Cervical back: Normal range of motion and neck supple.   Skin:     General: Skin is warm and dry.   Neurological:      Mental Status: She is alert and oriented to person, place, and time.            Assessment and Plan     1. Chest pain, atypical    2. Combined 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    3. Essential hypertension    4. Aortic atherosclerosis    5. FAUSTIN (dyspnea on exertion)    6. Pulmonary hypertension, unspecified        Plan:    Stress test ok. BNP 12 - doubt significant CHF   Continue Rx for HTN, pulmonary HTN, HLD  OV 6 months    Advance Care Planning     Date: 11/06/2024  Patient did not wish or was not able to name a surrogate decision maker or provide an Advance Care Plan.

## 2024-11-08 ENCOUNTER — HOSPITAL ENCOUNTER (OUTPATIENT)
Dept: RADIOLOGY | Facility: CLINIC | Age: 79
Discharge: HOME | End: 2024-11-08
Attending: FAMILY MEDICINE
Payer: MEDICARE

## 2024-11-08 DIAGNOSIS — M81.0 POSTMENOPAUSAL OSTEOPOROSIS OF MULTIPLE SITES: ICD-10-CM

## 2024-11-08 PROCEDURE — 77080 DXA BONE DENSITY AXIAL: CPT | Mod: 26,HCNC,, | Performed by: INTERNAL MEDICINE

## 2024-11-08 PROCEDURE — 77080 DXA BONE DENSITY AXIAL: CPT | Mod: TC,HCNC,PO

## 2024-11-11 ENCOUNTER — OFFICE VISIT (OUTPATIENT)
Dept: GASTROENTEROLOGY | Facility: CLINIC | Age: 79
End: 2024-11-11
Payer: MEDICARE

## 2024-11-11 ENCOUNTER — PATIENT MESSAGE (OUTPATIENT)
Dept: FAMILY MEDICINE | Facility: CLINIC | Age: 79
End: 2024-11-11
Payer: MEDICARE

## 2024-11-11 DIAGNOSIS — R14.0 BLOATING: ICD-10-CM

## 2024-11-11 PROCEDURE — 99499 UNLISTED E&M SERVICE: CPT | Mod: HCNC,S$GLB,,

## 2024-11-15 ENCOUNTER — LAB VISIT (OUTPATIENT)
Dept: LAB | Facility: HOSPITAL | Age: 79
End: 2024-11-15
Attending: INTERNAL MEDICINE
Payer: MEDICARE

## 2024-11-15 ENCOUNTER — INFUSION (OUTPATIENT)
Dept: INFUSION THERAPY | Facility: HOSPITAL | Age: 79
End: 2024-11-15
Attending: INTERNAL MEDICINE
Payer: MEDICARE

## 2024-11-15 VITALS
RESPIRATION RATE: 16 BRPM | HEART RATE: 91 BPM | OXYGEN SATURATION: 97 % | DIASTOLIC BLOOD PRESSURE: 62 MMHG | SYSTOLIC BLOOD PRESSURE: 135 MMHG | TEMPERATURE: 98 F

## 2024-11-15 DIAGNOSIS — D63.1 ANEMIA IN STAGE 3 CHRONIC KIDNEY DISEASE: ICD-10-CM

## 2024-11-15 DIAGNOSIS — D63.1 ANEMIA IN STAGE 3 CHRONIC KIDNEY DISEASE: Primary | ICD-10-CM

## 2024-11-15 DIAGNOSIS — N18.9 ANEMIA IN CHRONIC KIDNEY DISEASE, UNSPECIFIED CKD STAGE: ICD-10-CM

## 2024-11-15 DIAGNOSIS — D50.9 IRON DEFICIENCY ANEMIA, UNSPECIFIED IRON DEFICIENCY ANEMIA TYPE: ICD-10-CM

## 2024-11-15 DIAGNOSIS — N18.30 ANEMIA IN STAGE 3 CHRONIC KIDNEY DISEASE: Primary | ICD-10-CM

## 2024-11-15 DIAGNOSIS — Z53.1 PROCEDURE AND TREATMENT NOT CARRIED OUT BECAUSE OF PATIENT'S DECISION FOR REASONS OF BELIEF AND GROUP PRESSURE: ICD-10-CM

## 2024-11-15 DIAGNOSIS — D63.1 ANEMIA IN STAGE 3 CHRONIC KIDNEY DISEASE, UNSPECIFIED WHETHER STAGE 3A OR 3B CKD: ICD-10-CM

## 2024-11-15 DIAGNOSIS — D63.1 ANEMIA IN CHRONIC KIDNEY DISEASE, UNSPECIFIED CKD STAGE: ICD-10-CM

## 2024-11-15 DIAGNOSIS — N18.30 ANEMIA IN STAGE 3 CHRONIC KIDNEY DISEASE: ICD-10-CM

## 2024-11-15 DIAGNOSIS — N18.30 ANEMIA IN STAGE 3 CHRONIC KIDNEY DISEASE, UNSPECIFIED WHETHER STAGE 3A OR 3B CKD: ICD-10-CM

## 2024-11-15 LAB — HGB BLD-MCNC: 10.3 G/DL (ref 12–16)

## 2024-11-15 PROCEDURE — 85018 HEMOGLOBIN: CPT | Mod: HCNC | Performed by: INTERNAL MEDICINE

## 2024-11-15 PROCEDURE — 63600175 PHARM REV CODE 636 W HCPCS: Mod: JZ,EC,JG,HCNC | Performed by: INTERNAL MEDICINE

## 2024-11-15 PROCEDURE — 96372 THER/PROPH/DIAG INJ SC/IM: CPT | Mod: HCNC

## 2024-11-15 PROCEDURE — 36415 COLL VENOUS BLD VENIPUNCTURE: CPT | Mod: HCNC | Performed by: INTERNAL MEDICINE

## 2024-11-15 RX ADMIN — EPOETIN ALFA-EPBX 40000 UNITS: 40000 INJECTION, SOLUTION INTRAVENOUS; SUBCUTANEOUS at 01:11

## 2024-11-26 ENCOUNTER — PATIENT MESSAGE (OUTPATIENT)
Dept: FAMILY MEDICINE | Facility: CLINIC | Age: 79
End: 2024-11-26
Payer: MEDICARE

## 2024-11-29 ENCOUNTER — LAB VISIT (OUTPATIENT)
Dept: LAB | Facility: HOSPITAL | Age: 79
End: 2024-11-29
Attending: INTERNAL MEDICINE
Payer: MEDICARE

## 2024-11-29 DIAGNOSIS — N18.30 ANEMIA IN STAGE 3 CHRONIC KIDNEY DISEASE: ICD-10-CM

## 2024-11-29 DIAGNOSIS — D63.1 ANEMIA IN STAGE 3 CHRONIC KIDNEY DISEASE: ICD-10-CM

## 2024-11-29 LAB — HGB BLD-MCNC: 11 G/DL (ref 12–16)

## 2024-11-29 PROCEDURE — 36415 COLL VENOUS BLD VENIPUNCTURE: CPT | Mod: HCNC | Performed by: INTERNAL MEDICINE

## 2024-11-29 PROCEDURE — 85018 HEMOGLOBIN: CPT | Mod: HCNC | Performed by: INTERNAL MEDICINE

## 2024-12-02 ENCOUNTER — PATIENT MESSAGE (OUTPATIENT)
Dept: FAMILY MEDICINE | Facility: CLINIC | Age: 79
End: 2024-12-02
Payer: MEDICARE

## 2024-12-02 DIAGNOSIS — M81.0 AGE-RELATED OSTEOPOROSIS WITHOUT CURRENT PATHOLOGICAL FRACTURE: Primary | ICD-10-CM

## 2024-12-10 ENCOUNTER — PATIENT MESSAGE (OUTPATIENT)
Dept: FAMILY MEDICINE | Facility: CLINIC | Age: 79
End: 2024-12-10
Payer: MEDICARE

## 2024-12-10 DIAGNOSIS — G89.29 OTHER CHRONIC PAIN: Primary | Chronic | ICD-10-CM

## 2024-12-10 RX ORDER — PREGABALIN 75 MG/1
75 CAPSULE ORAL 2 TIMES DAILY
Qty: 60 CAPSULE | Refills: 5 | Status: SHIPPED | OUTPATIENT
Start: 2024-12-10 | End: 2025-06-10

## 2024-12-12 DIAGNOSIS — I10 ESSENTIAL HYPERTENSION: ICD-10-CM

## 2024-12-12 DIAGNOSIS — E03.9 HYPOTHYROIDISM, UNSPECIFIED TYPE: ICD-10-CM

## 2024-12-12 DIAGNOSIS — I10 HYPERTENSION, UNCONTROLLED: ICD-10-CM

## 2024-12-12 RX ORDER — NIFEDIPINE 90 MG/1
90 TABLET, EXTENDED RELEASE ORAL DAILY
Qty: 90 TABLET | Refills: 3 | OUTPATIENT
Start: 2024-12-12

## 2024-12-12 NOTE — TELEPHONE ENCOUNTER
Care Due:                  Date            Visit Type   Department     Provider  --------------------------------------------------------------------------------                                [facility]                              PRIMARY      MEDICINE/  Last Visit: 10-      CARE ([facility])   BENEDICT Mendoza                              [facility]                              PRIMARY      MEDICINE/  Next Visit: 02-      CARE ([facility])   BENEDICT Sanz  Test          Frequency    Reason                     Performed    Due Date  --------------------------------------------------------------------------------    Lipid Panel.  12 months..  atorvastatin, fenofibrate  12- 12-    TSH.........  12 months..  levothyroxine............  Not Found    Overdue    [employer] Embedded Care Due Messages. Reference number: [id number].   12/12/2024 10:08:38 AM CST

## 2024-12-12 NOTE — TELEPHONE ENCOUNTER
Refill Routing Note   Medication(s) are not appropriate for processing by [facility] for the following reason(s):        Required labs outdated    [facility] action(s):  Defer               Appointments  past 12m or future 3m with PCP    Date Provider   Last Visit   10/10/2024 Micaela Mendoza MD   Next Visit   2/6/2025 Micaela Mendoza MD   ED visits in past 90 days: 0        Note composed:2:30 PM 12/12/2024

## 2024-12-12 NOTE — TELEPHONE ENCOUNTER
Refill Routing Note   Medication(s) are not appropriate for processing by [facility] for the following reason(s):        Non-participating provider  Drug-drug interaction  Digital Med patient (Ordering User: Geovanny Ruiz PharmD  for 30mg)    [facility] action(s):  Route     Requires labs : Yes      Medication Therapy Plan: Duplicate Medication/Therapy: NIFEdipine: DIGITAL MED patient. Ordering User: Geovanny Ruiz PharmD for 30mg. Unclear if to be taking both      Appointments  past 12m or future 3m with PCP    Date Provider   Last Visit   10/10/2024 Micaela Mendoza MD   Next Visit   12/12/2024 Micaela Mendoza MD   ED visits in past 90 days: 0        Note composed:1:56 PM 12/12/2024

## 2024-12-13 ENCOUNTER — LAB VISIT (OUTPATIENT)
Dept: LAB | Facility: HOSPITAL | Age: 79
End: 2024-12-13
Attending: INTERNAL MEDICINE
Payer: MEDICARE

## 2024-12-13 ENCOUNTER — INFUSION (OUTPATIENT)
Dept: INFUSION THERAPY | Facility: HOSPITAL | Age: 79
End: 2024-12-13
Attending: INTERNAL MEDICINE
Payer: MEDICARE

## 2024-12-13 VITALS
HEART RATE: 101 BPM | TEMPERATURE: 98 F | SYSTOLIC BLOOD PRESSURE: 135 MMHG | OXYGEN SATURATION: 98 % | RESPIRATION RATE: 18 BRPM | DIASTOLIC BLOOD PRESSURE: 77 MMHG

## 2024-12-13 DIAGNOSIS — Z53.1 PROCEDURE AND TREATMENT NOT CARRIED OUT BECAUSE OF PATIENT'S DECISION FOR REASONS OF BELIEF AND GROUP PRESSURE: ICD-10-CM

## 2024-12-13 DIAGNOSIS — N18.30 ANEMIA IN STAGE 3 CHRONIC KIDNEY DISEASE: Primary | ICD-10-CM

## 2024-12-13 DIAGNOSIS — N18.9 ANEMIA IN CHRONIC KIDNEY DISEASE, UNSPECIFIED CKD STAGE: ICD-10-CM

## 2024-12-13 DIAGNOSIS — D63.1 ANEMIA IN STAGE 3 CHRONIC KIDNEY DISEASE, UNSPECIFIED WHETHER STAGE 3A OR 3B CKD: ICD-10-CM

## 2024-12-13 DIAGNOSIS — D50.9 IRON DEFICIENCY ANEMIA, UNSPECIFIED IRON DEFICIENCY ANEMIA TYPE: ICD-10-CM

## 2024-12-13 DIAGNOSIS — N18.30 ANEMIA IN STAGE 3 CHRONIC KIDNEY DISEASE: ICD-10-CM

## 2024-12-13 DIAGNOSIS — D63.1 ANEMIA IN CHRONIC KIDNEY DISEASE, UNSPECIFIED CKD STAGE: ICD-10-CM

## 2024-12-13 DIAGNOSIS — D63.1 ANEMIA IN STAGE 3 CHRONIC KIDNEY DISEASE: ICD-10-CM

## 2024-12-13 DIAGNOSIS — N18.30 ANEMIA IN STAGE 3 CHRONIC KIDNEY DISEASE, UNSPECIFIED WHETHER STAGE 3A OR 3B CKD: ICD-10-CM

## 2024-12-13 DIAGNOSIS — D63.1 ANEMIA IN STAGE 3 CHRONIC KIDNEY DISEASE: Primary | ICD-10-CM

## 2024-12-13 LAB — HGB BLD-MCNC: 10.8 G/DL (ref 12–16)

## 2024-12-13 PROCEDURE — 63600175 PHARM REV CODE 636 W HCPCS: Mod: JZ,EC,JG,HCNC | Performed by: INTERNAL MEDICINE

## 2024-12-13 PROCEDURE — 96372 THER/PROPH/DIAG INJ SC/IM: CPT | Mod: HCNC

## 2024-12-13 PROCEDURE — 85018 HEMOGLOBIN: CPT | Mod: HCNC | Performed by: INTERNAL MEDICINE

## 2024-12-13 PROCEDURE — 36415 COLL VENOUS BLD VENIPUNCTURE: CPT | Mod: HCNC | Performed by: INTERNAL MEDICINE

## 2024-12-13 RX ORDER — LEVOTHYROXINE SODIUM 50 UG/1
50 TABLET ORAL
Qty: 90 TABLET | Refills: 0 | Status: SHIPPED | OUTPATIENT
Start: 2024-12-13

## 2024-12-13 RX ORDER — NIFEDIPINE 60 MG/1
60 TABLET, EXTENDED RELEASE ORAL 2 TIMES DAILY
Qty: 180 TABLET | Refills: 1 | Status: SHIPPED | OUTPATIENT
Start: 2024-12-13 | End: 2025-12-13

## 2024-12-13 RX ADMIN — EPOETIN ALFA-EPBX 40000 UNITS: 40000 INJECTION, SOLUTION INTRAVENOUS; SUBCUTANEOUS at 11:12

## 2024-12-13 NOTE — PLAN OF CARE
Pt arrived to unit in wheelchair with family member. Pt had labs prior to appointment. Pt received Retacrit injection with no S&S of complications. Pt discharged off unit in NAD.

## 2024-12-17 ENCOUNTER — PATIENT MESSAGE (OUTPATIENT)
Dept: FAMILY MEDICINE | Facility: CLINIC | Age: 79
End: 2024-12-17
Payer: MEDICARE

## 2024-12-17 DIAGNOSIS — I26.99 PULMONARY EMBOLISM, UNSPECIFIED CHRONICITY, UNSPECIFIED PULMONARY EMBOLISM TYPE, UNSPECIFIED WHETHER ACUTE COR PULMONALE PRESENT: ICD-10-CM

## 2024-12-18 ENCOUNTER — OFFICE VISIT (OUTPATIENT)
Dept: HEMATOLOGY/ONCOLOGY | Facility: CLINIC | Age: 79
End: 2024-12-18
Payer: MEDICARE

## 2024-12-18 VITALS
HEIGHT: 62 IN | WEIGHT: 153.25 LBS | SYSTOLIC BLOOD PRESSURE: 149 MMHG | DIASTOLIC BLOOD PRESSURE: 77 MMHG | HEART RATE: 125 BPM | RESPIRATION RATE: 16 BRPM | OXYGEN SATURATION: 93 % | BODY MASS INDEX: 28.2 KG/M2

## 2024-12-18 DIAGNOSIS — D63.1 ANEMIA IN CHRONIC KIDNEY DISEASE, UNSPECIFIED CKD STAGE: Primary | ICD-10-CM

## 2024-12-18 DIAGNOSIS — D86.9 SARCOIDOSIS: ICD-10-CM

## 2024-12-18 DIAGNOSIS — N18.9 ANEMIA IN CHRONIC KIDNEY DISEASE, UNSPECIFIED CKD STAGE: Primary | ICD-10-CM

## 2024-12-18 DIAGNOSIS — N18.9 CHRONIC KIDNEY DISEASE, UNSPECIFIED CKD STAGE: ICD-10-CM

## 2024-12-18 DIAGNOSIS — Z86.711 HISTORY OF PULMONARY EMBOLISM: ICD-10-CM

## 2024-12-18 PROCEDURE — 1157F ADVNC CARE PLAN IN RCRD: CPT | Mod: HCNC,CPTII,S$GLB, | Performed by: INTERNAL MEDICINE

## 2024-12-18 PROCEDURE — 1159F MED LIST DOCD IN RCRD: CPT | Mod: HCNC,CPTII,S$GLB, | Performed by: INTERNAL MEDICINE

## 2024-12-18 PROCEDURE — 3288F FALL RISK ASSESSMENT DOCD: CPT | Mod: HCNC,CPTII,S$GLB, | Performed by: INTERNAL MEDICINE

## 2024-12-18 PROCEDURE — 1101F PT FALLS ASSESS-DOCD LE1/YR: CPT | Mod: HCNC,CPTII,S$GLB, | Performed by: INTERNAL MEDICINE

## 2024-12-18 PROCEDURE — 3078F DIAST BP <80 MM HG: CPT | Mod: HCNC,CPTII,S$GLB, | Performed by: INTERNAL MEDICINE

## 2024-12-18 PROCEDURE — 3077F SYST BP >= 140 MM HG: CPT | Mod: HCNC,CPTII,S$GLB, | Performed by: INTERNAL MEDICINE

## 2024-12-18 PROCEDURE — 99999 PR PBB SHADOW E&M-EST. PATIENT-LVL III: CPT | Mod: PBBFAC,HCNC,, | Performed by: INTERNAL MEDICINE

## 2024-12-18 PROCEDURE — 99214 OFFICE O/P EST MOD 30 MIN: CPT | Mod: HCNC,S$GLB,, | Performed by: INTERNAL MEDICINE

## 2024-12-18 PROCEDURE — 3072F LOW RISK FOR RETINOPATHY: CPT | Mod: HCNC,CPTII,S$GLB, | Performed by: INTERNAL MEDICINE

## 2024-12-18 PROCEDURE — 1126F AMNT PAIN NOTED NONE PRSNT: CPT | Mod: HCNC,CPTII,S$GLB, | Performed by: INTERNAL MEDICINE

## 2024-12-18 NOTE — PROGRESS NOTES
Subjective:        Patient ID: Regino Lawrence is a 79 y.o. female.    Chief Complaint: Follow-up anemia      Diagnosis: Anemia in CKD  Patient is a [employer]ology  .  Prior Hx;  The patient is seen today for f/u  chronic anemia in CKD undergoing EPO injections.The patient reports that she has been diagnosed with JOLLY in the past.  She has been on oral iron supplementation therapy, but could not tolerate or did not respond, she is uncertainShe also has  undergone intermittent IV iron therapy.  She is followed by GI and has undergone a colonoscopy earlier this year and was diagnosed with hemorrhoids for which she underwent banding procedure.  No melena, hematochezia,change in bowel habits.  She has also been diagnosed with B12 deficiency in the past, but reports she did not respond to B12 injections. No history of blood transfusions.  She is a [employer]ology.  She reports that she remembers getting injections in the 1970s when her blood count was low.   She is followed by Rheumatology for history of sarcoidosis with associated myopathy and arthropathy. She has been treated in the  past with methotrexate and Plaquenil, both of which were ineffectiveCellcept and imuran caused some unknown side effect. She is followed by PCP for DMShe was admitted 6/2022 with worsening SOB. She also has history of cervical spinal stenosis s/p posterior C3-C7 laminectomy and fusion on 11/16/15 by Dr. Conner.  Diagnosed with sarcoid at [facility] in [facility]. Previously on Plaquenil and methotrexate but both discontinued due to adverse effects.  Leflunomide was also attempted but held due to elevated blood pressure. Currently on prednisone 3 mg daily.       Interval Hx; In motorized scooter  Continues with chronic mild FAUSTIN  Reports some leg swelling   She is in process of establishing care with new Nephrologist 2/2 worsening renal fxn    She is undergoing epo inj q 2wks  She also has  undergone intermittent IV iron therapy  Hb  "11.2 g/dL on 6/3/24  Hb 10.8g/dL on 24   She is in process of establishing care with new Nephrologist 2/2 worsening renal fxn    She was started on Lyrica 75 mg bid per Neurology  She continues with Chronic diffuse arthralgias   Pain better controlled with prescribed medication  She is no longer followed by pain mgmt  Keppra increased to 1g bid  Last seizure 3 mos     She is f/b Rheumatology for sarcoidosis      PAST MEDICAL HISTORY:  Acid reflux, alopecia, anemia, anxiety disorder, chronic  kidney disease, depression, diabetes mellitus type 2, hyperlipidemia,  hypertension, hypothyroidism, osteoporosis, sarcoidosis.    PAST SURGICAL HISTORY:  Cholecystectomy, , tubal ligation, carpal tunnel release, cataracts.    FAMILY HISTORY: Unremarkable for cancer. Significant for HTN.       Review of Systems   Constitutional:  Positive for fatigue (chronic, mild). Negative for activity change and appetite change.   HENT:  Negative for hearing loss and nosebleeds.    Eyes:  Negative for visual disturbance.   Respiratory:  Positive for shortness of breath (mild FAUSTIN, chronic). Negative for cough.    Cardiovascular:  Negative for chest pain and leg swelling.   Gastrointestinal:  Positive for constipation. Negative for abdominal pain, diarrhea and nausea.   Genitourinary:  Negative for flank pain and urgency.   Musculoskeletal:  Positive for arthralgias, back pain, joint swelling and myalgias. Negative for gait problem.   Skin:  Negative for rash.        No petechiae, ecchymoses   Neurological:  Negative for weakness, light-headedness and headaches.   Hematological:  Negative for adenopathy. Does not bruise/bleed easily.       Objective:         Vitals:    24 1340   BP: (!) 149/77   BP Location: Left arm   Patient Position: Sitting   Pulse: (!) 125   Resp: 16   SpO2: (!) 93%   Weight: 69.5 kg (153 lb 3.5 oz)   Height: 5' 2" (1.575 m)             .Physical Exam   Constitutional: She is oriented to person, place, " and time. She appears well-developed and well-nourished in motorized scooter  HENT:   Head: Normocephalic.   Eyes: Conjunctivae and lids are normal.No scleral icterus.   Neck: Normal range of motion. Neck supple.  Cardiovascular: tachycardic   No murmur heard.  Pulmonary/Chest: Breath sounds normal. She has no wheezes. She has no rales.   Abdominal: Soft. Bowel sounds are normal. There is no tenderness. There is no rebound and no guarding.   Musculoskeletal: Ambulates w/assistance of motorized scooter  Neurological: She is alert and oriented to person, place, and time. No cranial nerve deficit.  Skin: Skin is warm and dry. No ecchymosis, no petechiae and no rash noted. No erythema.   Psychiatric: She has a normal mood and affect.               Lab Results   Component Value Date    WBC 5.23 09/20/2024    HGB 10.8 (L) 12/13/2024    HCT 33.1 (L) 09/20/2024     (H) 09/20/2024     09/20/2024     Lab Results   Component Value Date    IRON 110 09/06/2024    TIBC 318 09/06/2024    FERRITIN 944 (H) 09/06/2024     SPEP-nl      CT renal 3/6/2017   IMPRESSION:  1.  No renal, ureteral or bladder calculi.  No hydronephrosis or ureterectasis.  2.  Poorly distended bladder.  Mild bladder wall prominence.  Mild cystitis cannot be   excluded.  3.  Moderate constipation.  Normal appendix      Lab Results   Component Value Date    IRON 110 09/06/2024    TIBC 318 09/06/2024    FERRITIN 944 (H) 09/06/2024     Lab Results   Component Value Date    WBC 5.23 09/20/2024    HGB 10.8 (L) 12/13/2024    HCT 33.1 (L) 09/20/2024     (H) 09/20/2024     09/20/2024         Assessment:       1. Anemia in chronic kidney disease, unspecified CKD stage    2. Chronic kidney disease, unspecified CKD stage    3. History of pulmonary embolism                  Plan:   1.2..   Pt is a Mandaeism and declines/not interested in blood and blood products due to Confucianism beliefs  She is undergoing epo inj q 2wks  Pt followed by  Nephrology . It has been determined early CKD stage III, suspect due to age-related renal nephron loss, along with possible diabetic nephropathy v. hypertensive nephrosclerosis  CBC q2wks STANDING  Cont  EPO dosage  inj q 2wks( pending lab parameters)  Continue periodic monitoring of Fe studies  Hb 11.2 g/dL on 6/3/24  Hb 10.8g/dL on 9/16/24   Hb 10.89g/dL on 12/13/24  Fe studies reviewed    According to KIDIGO guidelines patients with CKD , a  gfr , <60 cc/min and anemia, iron therapy is appropriate if the Tsat is < 30 and ferritin < 500 mg/dl.    Cont KRISTINA q 2wks ( pending lab parameters)  Repeat CBC and Fe studies in 2mos  4 stable  Last Cr level 1.2 mg/dL on 9/16/24   In process of establishing care with new Nephrologist    3. seen on 2021 CTA chest.  RML and RLL.  Currently on Eliquis.  Endorses strict adherence with chronic anticoagulation  It was determined appears  unprovoked.  Continue apixaban indefinitely    4. F/b Rheumatology  Manifested by myopathy and arthropathy.  Persistent joint pain and myalgias . Unable to tolerate immunosuppressants/steroid sparing agents  She remains on chronic  steroids 3mg          CBC q2wks STANDING placed x 10  Cont EPO  inj q 2wks( pending lab parameters)  F/u in 3mos with cbc, TIBC and Ferritin studies prior to f/u -standing orders x 6  ( Please ADD Fe studies to follow up labs to next lab draw  Also add Fe studies to follow up labs in 3mos     I spent a total of 40  minutes on the day of the visit.This includes face to face time and non-face to face time preparing to see the patient (eg, review of tests), obtaining and/or reviewing separately obtained history, documenting clinical information in the electronic or other health record, independently interpreting results and communicating results to the patient/family/caregiver, and coordinating care.      Advance Care Planning     Power of   I previously initiated the process of advance care planning today and  explained the importance of this process to the patient.  I introduced the concept of advance directives to the patient, as well. Then the patient received detailed information about the importance of designating a Health Care Power of  (HCPOA). She was also instructed to communicate with this person about their wishes for future healthcare, should she become sick and lose decision-making capacity. The patient has  previously appointed a HCPOA. Pt completed in 2015           CC: Micaela Mendoza M.D.

## 2024-12-18 NOTE — Clinical Note
CBC q2wks STANDING placed x 10 Cont EPO  inj q 2wks( pending lab parameters) F/u in 3mos with cbc, TIBC and Ferritin studies prior to f/u -standing orders x 6 ( Please ADD Fe studies to follow up labs to next lab draw Also add Fe studies to follow up labs in 3mos

## 2024-12-21 DIAGNOSIS — I26.99 PULMONARY EMBOLISM, UNSPECIFIED CHRONICITY, UNSPECIFIED PULMONARY EMBOLISM TYPE, UNSPECIFIED WHETHER ACUTE COR PULMONALE PRESENT: ICD-10-CM

## 2024-12-23 ENCOUNTER — CLINICAL SUPPORT (OUTPATIENT)
Dept: FAMILY MEDICINE | Facility: CLINIC | Age: 79
End: 2024-12-23
Payer: MEDICARE

## 2024-12-23 DIAGNOSIS — M81.0 AGE-RELATED OSTEOPOROSIS WITHOUT CURRENT PATHOLOGICAL FRACTURE: Primary | ICD-10-CM

## 2024-12-23 PROCEDURE — 96372 THER/PROPH/DIAG INJ SC/IM: CPT | Mod: HCNC,S$GLB,, | Performed by: FAMILY MEDICINE

## 2024-12-23 PROCEDURE — 99499 UNLISTED E&M SERVICE: CPT | Mod: HCNC,S$GLB,, | Performed by: FAMILY MEDICINE

## 2024-12-23 NOTE — TELEPHONE ENCOUNTER
Refill Routing Note   Medication(s) are not appropriate for processing by [facility] for the following reason(s):        Outside of protocol    [facility] action(s):  Route             Appointments  past 12m or future 3m with PCP    Date Provider   Last Visit   10/10/2024 Micaela Mendoza MD   Next Visit   2/6/2025 Micaela Mendoza MD   ED visits in past 90 days: 0        Note composed:7:42 AM 12/23/2024

## 2024-12-27 ENCOUNTER — LAB VISIT (OUTPATIENT)
Dept: LAB | Facility: HOSPITAL | Age: 79
End: 2024-12-27
Attending: INTERNAL MEDICINE
Payer: MEDICARE

## 2024-12-27 DIAGNOSIS — D63.1 ANEMIA IN STAGE 3 CHRONIC KIDNEY DISEASE: ICD-10-CM

## 2024-12-27 DIAGNOSIS — D63.1 ANEMIA IN CHRONIC KIDNEY DISEASE, UNSPECIFIED CKD STAGE: ICD-10-CM

## 2024-12-27 DIAGNOSIS — N18.9 ANEMIA IN CHRONIC KIDNEY DISEASE, UNSPECIFIED CKD STAGE: ICD-10-CM

## 2024-12-27 DIAGNOSIS — N18.30 ANEMIA IN STAGE 3 CHRONIC KIDNEY DISEASE: ICD-10-CM

## 2024-12-27 LAB
BASOPHILS # BLD AUTO: 0.01 K/UL (ref 0–0.2)
BASOPHILS NFR BLD: 0.4 % (ref 0–1.9)
DIFFERENTIAL METHOD BLD: ABNORMAL
EOSINOPHIL # BLD AUTO: 0.1 K/UL (ref 0–0.5)
EOSINOPHIL NFR BLD: 2.9 % (ref 0–8)
ERYTHROCYTE [DISTWIDTH] IN BLOOD BY AUTOMATED COUNT: 13.2 % (ref 11.5–14.5)
FERRITIN SERPL-MCNC: 873 NG/ML (ref 20–300)
HCT VFR BLD AUTO: 32.1 % (ref 37–48.5)
HGB BLD-MCNC: 11 G/DL (ref 12–16)
HGB BLD-MCNC: 11 G/DL (ref 12–16)
IMM GRANULOCYTES # BLD AUTO: 0.01 K/UL (ref 0–0.04)
IMM GRANULOCYTES NFR BLD AUTO: 0.4 % (ref 0–0.5)
IRON SERPL-MCNC: 84 UG/DL (ref 30–160)
LYMPHOCYTES # BLD AUTO: 1 K/UL (ref 1–4.8)
LYMPHOCYTES NFR BLD: 35.1 % (ref 18–48)
MCH RBC QN AUTO: 34.1 PG (ref 27–31)
MCHC RBC AUTO-ENTMCNC: 34.3 G/DL (ref 32–36)
MCV RBC AUTO: 99 FL (ref 82–98)
MONOCYTES # BLD AUTO: 0.4 K/UL (ref 0.3–1)
MONOCYTES NFR BLD: 14 % (ref 4–15)
NEUTROPHILS # BLD AUTO: 1.3 K/UL (ref 1.8–7.7)
NEUTROPHILS NFR BLD: 47.2 % (ref 38–73)
NRBC BLD-RTO: 0 /100 WBC
PLATELET # BLD AUTO: 246 K/UL (ref 150–450)
PMV BLD AUTO: 8.3 FL (ref 9.2–12.9)
RBC # BLD AUTO: 3.23 M/UL (ref 4–5.4)
SATURATED IRON: 27 % (ref 20–50)
TOTAL IRON BINDING CAPACITY: 311 UG/DL (ref 250–450)
TRANSFERRIN SERPL-MCNC: 210 MG/DL (ref 200–375)
WBC # BLD AUTO: 2.79 K/UL (ref 3.9–12.7)

## 2024-12-27 PROCEDURE — 82728 ASSAY OF FERRITIN: CPT | Mod: HCNC | Performed by: INTERNAL MEDICINE

## 2024-12-27 PROCEDURE — 84466 ASSAY OF TRANSFERRIN: CPT | Mod: HCNC | Performed by: INTERNAL MEDICINE

## 2024-12-27 PROCEDURE — 36415 COLL VENOUS BLD VENIPUNCTURE: CPT | Mod: HCNC | Performed by: INTERNAL MEDICINE

## 2024-12-27 PROCEDURE — 85025 COMPLETE CBC W/AUTO DIFF WBC: CPT | Mod: HCNC | Performed by: INTERNAL MEDICINE

## 2025-01-10 ENCOUNTER — INFUSION (OUTPATIENT)
Dept: INFUSION THERAPY | Facility: HOSPITAL | Age: 80
End: 2025-01-10
Attending: INTERNAL MEDICINE
Payer: MEDICARE

## 2025-01-10 VITALS
OXYGEN SATURATION: 96 % | RESPIRATION RATE: 16 BRPM | SYSTOLIC BLOOD PRESSURE: 143 MMHG | TEMPERATURE: 98 F | HEART RATE: 120 BPM | DIASTOLIC BLOOD PRESSURE: 64 MMHG

## 2025-01-10 DIAGNOSIS — D50.9 IRON DEFICIENCY ANEMIA, UNSPECIFIED IRON DEFICIENCY ANEMIA TYPE: ICD-10-CM

## 2025-01-10 DIAGNOSIS — D63.1 ANEMIA IN STAGE 3 CHRONIC KIDNEY DISEASE, UNSPECIFIED WHETHER STAGE 3A OR 3B CKD: ICD-10-CM

## 2025-01-10 DIAGNOSIS — N18.30 ANEMIA IN STAGE 3 CHRONIC KIDNEY DISEASE: Primary | ICD-10-CM

## 2025-01-10 DIAGNOSIS — D63.1 ANEMIA IN CHRONIC KIDNEY DISEASE, UNSPECIFIED CKD STAGE: ICD-10-CM

## 2025-01-10 DIAGNOSIS — N18.9 ANEMIA IN CHRONIC KIDNEY DISEASE, UNSPECIFIED CKD STAGE: ICD-10-CM

## 2025-01-10 DIAGNOSIS — Z53.1 PROCEDURE AND TREATMENT NOT CARRIED OUT BECAUSE OF PATIENT'S DECISION FOR REASONS OF BELIEF AND GROUP PRESSURE: ICD-10-CM

## 2025-01-10 DIAGNOSIS — N18.30 ANEMIA IN STAGE 3 CHRONIC KIDNEY DISEASE, UNSPECIFIED WHETHER STAGE 3A OR 3B CKD: ICD-10-CM

## 2025-01-10 DIAGNOSIS — D63.1 ANEMIA IN STAGE 3 CHRONIC KIDNEY DISEASE: Primary | ICD-10-CM

## 2025-01-10 PROCEDURE — 96372 THER/PROPH/DIAG INJ SC/IM: CPT | Mod: HCNC

## 2025-01-10 PROCEDURE — 63600175 PHARM REV CODE 636 W HCPCS: Mod: JZ,EC,TB,HCNC | Performed by: INTERNAL MEDICINE

## 2025-01-10 RX ADMIN — EPOETIN ALFA-EPBX 40000 UNITS: 40000 INJECTION, SOLUTION INTRAVENOUS; SUBCUTANEOUS at 12:01

## 2025-01-10 NOTE — PLAN OF CARE
Patient used motorized wheelchair to enter unit, no s/s of distress, VSS. Patient complains of daily fatigue and SOB upon exertion. Education provided on managing fatigue and SOB, encouraged to follow up with MD. Plan of care reviewed with patient. Labs reviewed (HGB 10.9), Retacrit given right arm sub q. Treatment tolerated well. Patient used motorized wheelchair to exit unit, no s/s of distress.

## 2025-01-25 ENCOUNTER — LAB VISIT (OUTPATIENT)
Dept: LAB | Facility: HOSPITAL | Age: 80
End: 2025-01-25
Attending: INTERNAL MEDICINE
Payer: MEDICARE

## 2025-01-25 DIAGNOSIS — D63.1 ANEMIA IN STAGE 3 CHRONIC KIDNEY DISEASE: ICD-10-CM

## 2025-01-25 DIAGNOSIS — D63.1 ANEMIA IN CHRONIC KIDNEY DISEASE, UNSPECIFIED CKD STAGE: ICD-10-CM

## 2025-01-25 DIAGNOSIS — E78.2 COMBINED HYPERLIPIDEMIA ASSOCIATED WITH TYPE 2 DIABETES MELLITUS: Chronic | ICD-10-CM

## 2025-01-25 DIAGNOSIS — E11.69 COMBINED HYPERLIPIDEMIA ASSOCIATED WITH TYPE 2 DIABETES MELLITUS: Chronic | ICD-10-CM

## 2025-01-25 DIAGNOSIS — E11.9 DM TYPE 2 WITHOUT RETINOPATHY: ICD-10-CM

## 2025-01-25 DIAGNOSIS — R73.9 HYPERGLYCEMIA: ICD-10-CM

## 2025-01-25 DIAGNOSIS — N18.30 ANEMIA IN STAGE 3 CHRONIC KIDNEY DISEASE: ICD-10-CM

## 2025-01-25 DIAGNOSIS — N18.9 ANEMIA IN CHRONIC KIDNEY DISEASE, UNSPECIFIED CKD STAGE: ICD-10-CM

## 2025-01-25 LAB
ALBUMIN/CREAT UR: 29.4 UG/MG (ref 0–30)
BASOPHILS # BLD AUTO: 0.02 K/UL (ref 0–0.2)
BASOPHILS NFR BLD: 0.6 % (ref 0–1.9)
CHOLEST SERPL-MCNC: 113 MG/DL (ref 120–199)
CHOLEST/HDLC SERPL: 2.8 {RATIO} (ref 2–5)
CREAT UR-MCNC: 68 MG/DL (ref 15–325)
DIFFERENTIAL METHOD BLD: ABNORMAL
EOSINOPHIL # BLD AUTO: 0.1 K/UL (ref 0–0.5)
EOSINOPHIL NFR BLD: 2.2 % (ref 0–8)
ERYTHROCYTE [DISTWIDTH] IN BLOOD BY AUTOMATED COUNT: 13.4 % (ref 11.5–14.5)
ESTIMATED AVG GLUCOSE: 103 MG/DL (ref 68–131)
FERRITIN SERPL-MCNC: 912 NG/ML (ref 20–300)
HBA1C MFR BLD: 5.2 % (ref 4–5.6)
HCT VFR BLD AUTO: 33.6 % (ref 37–48.5)
HDLC SERPL-MCNC: 40 MG/DL (ref 40–75)
HDLC SERPL: 35.4 % (ref 20–50)
HGB BLD-MCNC: 11.3 G/DL (ref 12–16)
HGB BLD-MCNC: 11.3 G/DL (ref 12–16)
IMM GRANULOCYTES # BLD AUTO: 0.01 K/UL (ref 0–0.04)
IMM GRANULOCYTES NFR BLD AUTO: 0.3 % (ref 0–0.5)
IRON SERPL-MCNC: 99 UG/DL (ref 30–160)
LDLC SERPL CALC-MCNC: 51.8 MG/DL (ref 63–159)
LYMPHOCYTES # BLD AUTO: 1 K/UL (ref 1–4.8)
LYMPHOCYTES NFR BLD: 29.7 % (ref 18–48)
MCH RBC QN AUTO: 33.8 PG (ref 27–31)
MCHC RBC AUTO-ENTMCNC: 33.6 G/DL (ref 32–36)
MCV RBC AUTO: 101 FL (ref 82–98)
MICROALBUMIN UR DL<=1MG/L-MCNC: 20 UG/ML
MONOCYTES # BLD AUTO: 0.5 K/UL (ref 0.3–1)
MONOCYTES NFR BLD: 16.1 % (ref 4–15)
NEUTROPHILS # BLD AUTO: 1.7 K/UL (ref 1.8–7.7)
NEUTROPHILS NFR BLD: 51.1 % (ref 38–73)
NONHDLC SERPL-MCNC: 73 MG/DL
NRBC BLD-RTO: 0 /100 WBC
PLATELET # BLD AUTO: 280 K/UL (ref 150–450)
PMV BLD AUTO: 8.5 FL (ref 9.2–12.9)
RBC # BLD AUTO: 3.34 M/UL (ref 4–5.4)
SATURATED IRON: 29 % (ref 20–50)
TOTAL IRON BINDING CAPACITY: 343 UG/DL (ref 250–450)
TRANSFERRIN SERPL-MCNC: 232 MG/DL (ref 200–375)
TRIGL SERPL-MCNC: 106 MG/DL (ref 30–150)
WBC # BLD AUTO: 3.23 K/UL (ref 3.9–12.7)

## 2025-01-25 PROCEDURE — 83540 ASSAY OF IRON: CPT | Mod: HCNC | Performed by: INTERNAL MEDICINE

## 2025-01-25 PROCEDURE — 83036 HEMOGLOBIN GLYCOSYLATED A1C: CPT | Mod: HCNC | Performed by: FAMILY MEDICINE

## 2025-01-25 PROCEDURE — 82043 UR ALBUMIN QUANTITATIVE: CPT | Mod: HCNC | Performed by: FAMILY MEDICINE

## 2025-01-25 PROCEDURE — 85025 COMPLETE CBC W/AUTO DIFF WBC: CPT | Mod: HCNC | Performed by: INTERNAL MEDICINE

## 2025-01-25 PROCEDURE — 82728 ASSAY OF FERRITIN: CPT | Mod: HCNC | Performed by: INTERNAL MEDICINE

## 2025-01-25 PROCEDURE — 36415 COLL VENOUS BLD VENIPUNCTURE: CPT | Mod: HCNC | Performed by: FAMILY MEDICINE

## 2025-01-25 PROCEDURE — 80061 LIPID PANEL: CPT | Mod: HCNC

## 2025-01-31 ENCOUNTER — OFFICE VISIT (OUTPATIENT)
Dept: OPHTHALMOLOGY | Facility: CLINIC | Age: 80
End: 2025-01-31
Payer: MEDICARE

## 2025-01-31 DIAGNOSIS — H17.9 CORNEAL SCAR, RIGHT EYE: ICD-10-CM

## 2025-01-31 DIAGNOSIS — H34.8120 HEMISPHERIC RETINAL VEIN OCCLUSION WITH MACULAR EDEMA OF LEFT EYE: ICD-10-CM

## 2025-01-31 DIAGNOSIS — Z96.1 PSEUDOPHAKIA: ICD-10-CM

## 2025-01-31 DIAGNOSIS — E11.9 DM TYPE 2 WITHOUT RETINOPATHY: ICD-10-CM

## 2025-01-31 DIAGNOSIS — H04.123 DRY EYE SYNDROME, BILATERAL: Primary | ICD-10-CM

## 2025-01-31 DIAGNOSIS — H52.7 REFRACTIVE ERROR: ICD-10-CM

## 2025-01-31 PROCEDURE — 1157F ADVNC CARE PLAN IN RCRD: CPT | Mod: HCNC,CPTII,S$GLB, | Performed by: OPHTHALMOLOGY

## 2025-01-31 PROCEDURE — 92014 COMPRE OPH EXAM EST PT 1/>: CPT | Mod: HCNC,S$GLB,, | Performed by: OPHTHALMOLOGY

## 2025-01-31 PROCEDURE — 1160F RVW MEDS BY RX/DR IN RCRD: CPT | Mod: HCNC,CPTII,S$GLB, | Performed by: OPHTHALMOLOGY

## 2025-01-31 PROCEDURE — 1159F MED LIST DOCD IN RCRD: CPT | Mod: HCNC,CPTII,S$GLB, | Performed by: OPHTHALMOLOGY

## 2025-01-31 PROCEDURE — 3288F FALL RISK ASSESSMENT DOCD: CPT | Mod: HCNC,CPTII,S$GLB, | Performed by: OPHTHALMOLOGY

## 2025-01-31 PROCEDURE — 2023F DILAT RTA XM W/O RTNOPTHY: CPT | Mod: HCNC,CPTII,S$GLB, | Performed by: OPHTHALMOLOGY

## 2025-01-31 PROCEDURE — 1101F PT FALLS ASSESS-DOCD LE1/YR: CPT | Mod: HCNC,CPTII,S$GLB, | Performed by: OPHTHALMOLOGY

## 2025-01-31 PROCEDURE — 1126F AMNT PAIN NOTED NONE PRSNT: CPT | Mod: HCNC,CPTII,S$GLB, | Performed by: OPHTHALMOLOGY

## 2025-01-31 PROCEDURE — 99999 PR PBB SHADOW E&M-EST. PATIENT-LVL II: CPT | Mod: PBBFAC,HCNC,, | Performed by: OPHTHALMOLOGY

## 2025-02-01 NOTE — PROGRESS NOTES
Subjective:       Patient ID: Regino Lawrence is a 79 y.o. female.    Chief Complaint: Diabetic Eye Exam    HPI    DLS: 9/29/2023    Pt here for 12 Month Check;  Pt states eyes have been itching lately. Pt states vision has been blurry.   Pt denies any floaters, flashes or diplopia.     Meds:  AT's PRN OU    POHx:   1. HRVO w/ ME left eye    Hx avastin injections       Hemoglobin A1C       Date                     Value               Ref Range             Status                01/25/2025               5.2                 4.0 - 5.6 %           Final              Comment:    ADA Screening Guidelines:  5.7-6.4%  Consistent with   prediabetes  >or=6.5%  Consistent with diabetes    High levels of fetal   hemoglobin interfere with the HbA1C  assay. Heterozygous hemoglobin   variants (HbS, HgC, etc)do  not significantly interfere with this assay.     However, presence of multiple variants may affect accuracy.         09/26/2024               6.3 (H)             4.0 - 5.6 %           Final              Comment:    ADA Screening Guidelines:  5.7-6.4%  Consistent with   prediabetes  >or=6.5%  Consistent with diabetes    High levels of fetal   hemoglobin interfere with the HbA1C  assay. Heterozygous hemoglobin   variants (HbS, HgC, etc)do  not significantly interfere with this assay.     However, presence of multiple variants may affect accuracy.         03/01/2024               5.5                 4.0 - 5.6 %           Final              Comment:    ADA Screening Guidelines:  5.7-6.4%  Consistent with   prediabetes  >or=6.5%  Consistent with diabetes    High levels of fetal   hemoglobin interfere with the HbA1C  assay. Heterozygous hemoglobin   variants (HbS, HgC, etc)do  not significantly interfere with this assay.     However, presence of multiple variants may affect accuracy.    ----------   Last edited by Gail Atkinson on 1/31/2025  1:11 PM.             Assessment:       1. Dry eye syndrome, bilateral    2. Corneal scar,  right eye    3. 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 DM type 2 without retinopathy    5. Refractive error    6. Pseudophakia        Plan:       JOSELINE OU-Needs more AT's.  K scar OD-Stable.  Hemispheric RVO OS-Appears stable.  DM-No NPDR OU.  RE-Pt does not want MRx.        AT's.  Control DM.  RTC 1 yr.

## 2025-02-07 ENCOUNTER — TELEPHONE (OUTPATIENT)
Dept: INFUSION THERAPY | Facility: HOSPITAL | Age: 80
End: 2025-02-07
Payer: MEDICARE

## 2025-02-07 ENCOUNTER — LAB VISIT (OUTPATIENT)
Dept: LAB | Facility: HOSPITAL | Age: 80
End: 2025-02-07
Attending: INTERNAL MEDICINE
Payer: MEDICARE

## 2025-02-07 ENCOUNTER — INFUSION (OUTPATIENT)
Dept: INFUSION THERAPY | Facility: HOSPITAL | Age: 80
End: 2025-02-07
Attending: INTERNAL MEDICINE
Payer: MEDICARE

## 2025-02-07 VITALS
RESPIRATION RATE: 18 BRPM | SYSTOLIC BLOOD PRESSURE: 137 MMHG | HEART RATE: 93 BPM | DIASTOLIC BLOOD PRESSURE: 63 MMHG | OXYGEN SATURATION: 97 % | TEMPERATURE: 98 F

## 2025-02-07 DIAGNOSIS — D50.9 IRON DEFICIENCY ANEMIA, UNSPECIFIED IRON DEFICIENCY ANEMIA TYPE: ICD-10-CM

## 2025-02-07 DIAGNOSIS — D63.1 ANEMIA IN STAGE 3 CHRONIC KIDNEY DISEASE, UNSPECIFIED WHETHER STAGE 3A OR 3B CKD: ICD-10-CM

## 2025-02-07 DIAGNOSIS — N18.9 ANEMIA IN CHRONIC KIDNEY DISEASE, UNSPECIFIED CKD STAGE: ICD-10-CM

## 2025-02-07 DIAGNOSIS — N18.30 ANEMIA IN STAGE 3 CHRONIC KIDNEY DISEASE, UNSPECIFIED WHETHER STAGE 3A OR 3B CKD: ICD-10-CM

## 2025-02-07 DIAGNOSIS — D63.1 ANEMIA IN CHRONIC KIDNEY DISEASE, UNSPECIFIED CKD STAGE: ICD-10-CM

## 2025-02-07 DIAGNOSIS — N18.30 ANEMIA IN STAGE 3 CHRONIC KIDNEY DISEASE: Primary | ICD-10-CM

## 2025-02-07 DIAGNOSIS — D63.1 ANEMIA IN STAGE 3 CHRONIC KIDNEY DISEASE: Primary | ICD-10-CM

## 2025-02-07 DIAGNOSIS — Z53.1 PROCEDURE AND TREATMENT NOT CARRIED OUT BECAUSE OF PATIENT'S DECISION FOR REASONS OF BELIEF AND GROUP PRESSURE: ICD-10-CM

## 2025-02-07 LAB
BASOPHILS # BLD AUTO: 0.02 K/UL (ref 0–0.2)
BASOPHILS NFR BLD: 0.6 % (ref 0–1.9)
DIFFERENTIAL METHOD BLD: ABNORMAL
EOSINOPHIL # BLD AUTO: 0.1 K/UL (ref 0–0.5)
EOSINOPHIL NFR BLD: 2.9 % (ref 0–8)
ERYTHROCYTE [DISTWIDTH] IN BLOOD BY AUTOMATED COUNT: 12.7 % (ref 11.5–14.5)
FERRITIN SERPL-MCNC: 895 NG/ML (ref 20–300)
HCT VFR BLD AUTO: 31 % (ref 37–48.5)
HGB BLD-MCNC: 10.6 G/DL (ref 12–16)
IMM GRANULOCYTES # BLD AUTO: 0.03 K/UL (ref 0–0.04)
IMM GRANULOCYTES NFR BLD AUTO: 0.9 % (ref 0–0.5)
IRON SERPL-MCNC: 102 UG/DL (ref 30–160)
LYMPHOCYTES # BLD AUTO: 1.1 K/UL (ref 1–4.8)
LYMPHOCYTES NFR BLD: 30.4 % (ref 18–48)
MCH RBC QN AUTO: 34.2 PG (ref 27–31)
MCHC RBC AUTO-ENTMCNC: 34.2 G/DL (ref 32–36)
MCV RBC AUTO: 100 FL (ref 82–98)
MONOCYTES # BLD AUTO: 0.4 K/UL (ref 0.3–1)
MONOCYTES NFR BLD: 11.3 % (ref 4–15)
NEUTROPHILS # BLD AUTO: 1.9 K/UL (ref 1.8–7.7)
NEUTROPHILS NFR BLD: 53.9 % (ref 38–73)
NRBC BLD-RTO: 0 /100 WBC
PLATELET # BLD AUTO: 264 K/UL (ref 150–450)
PMV BLD AUTO: 8.8 FL (ref 9.2–12.9)
RBC # BLD AUTO: 3.1 M/UL (ref 4–5.4)
SATURATED IRON: 32 % (ref 20–50)
TOTAL IRON BINDING CAPACITY: 321 UG/DL (ref 250–450)
TRANSFERRIN SERPL-MCNC: 217 MG/DL (ref 200–375)
WBC # BLD AUTO: 3.45 K/UL (ref 3.9–12.7)

## 2025-02-07 PROCEDURE — 63600175 PHARM REV CODE 636 W HCPCS: Mod: JZ,EC,TB,HCNC | Performed by: INTERNAL MEDICINE

## 2025-02-07 PROCEDURE — 85025 COMPLETE CBC W/AUTO DIFF WBC: CPT | Mod: HCNC | Performed by: INTERNAL MEDICINE

## 2025-02-07 PROCEDURE — 36415 COLL VENOUS BLD VENIPUNCTURE: CPT | Mod: HCNC | Performed by: INTERNAL MEDICINE

## 2025-02-07 PROCEDURE — 82728 ASSAY OF FERRITIN: CPT | Mod: HCNC | Performed by: INTERNAL MEDICINE

## 2025-02-07 PROCEDURE — 96372 THER/PROPH/DIAG INJ SC/IM: CPT | Mod: HCNC

## 2025-02-07 PROCEDURE — 84466 ASSAY OF TRANSFERRIN: CPT | Mod: HCNC | Performed by: INTERNAL MEDICINE

## 2025-02-07 RX ADMIN — EPOETIN ALFA-EPBX 40000 UNITS: 40000 INJECTION, SOLUTION INTRAVENOUS; SUBCUTANEOUS at 12:02

## 2025-02-07 NOTE — PLAN OF CARE
Patient presented to unit for q2w Retacrit 40k injection. VSS. No new or worsening complaints voiced. Labs reviewed. Injection administered and tolerated without difficulty. Patient discharged in NAD.         Problem: Chronic Kidney Disease  Goal: Optimal Coping with Chronic Illness  Outcome: Progressing  Goal: Electrolyte Balance  Outcome: Progressing  Goal: Fluid Balance  Outcome: Progressing  Goal: Optimal Functional Ability  Outcome: Progressing  Goal: Absence of Anemia Signs and Symptoms  Outcome: Progressing  Goal: Optimal Oral Intake  Outcome: Progressing  Goal: Acceptable Pain Control  Outcome: Progressing  Goal: Minimize Renal Failure Effects  Outcome: Progressing

## 2025-02-13 ENCOUNTER — OFFICE VISIT (OUTPATIENT)
Dept: NEUROLOGY | Facility: CLINIC | Age: 80
End: 2025-02-13
Payer: MEDICARE

## 2025-02-13 ENCOUNTER — LAB VISIT (OUTPATIENT)
Dept: LAB | Facility: HOSPITAL | Age: 80
End: 2025-02-13
Attending: INTERNAL MEDICINE
Payer: MEDICARE

## 2025-02-13 VITALS
OXYGEN SATURATION: 98 % | DIASTOLIC BLOOD PRESSURE: 82 MMHG | SYSTOLIC BLOOD PRESSURE: 137 MMHG | BODY MASS INDEX: 27.7 KG/M2 | WEIGHT: 150.56 LBS | HEIGHT: 62 IN | HEART RATE: 90 BPM

## 2025-02-13 DIAGNOSIS — I63.89 OTHER CEREBRAL INFARCTION: Primary | ICD-10-CM

## 2025-02-13 DIAGNOSIS — R41.3 OTHER AMNESIA: ICD-10-CM

## 2025-02-13 LAB
FOLATE SERPL-MCNC: 17.8 NG/ML (ref 4–24)
TSH SERPL DL<=0.005 MIU/L-ACNC: 1.56 UIU/ML (ref 0.4–4)
VIT B12 SERPL-MCNC: 613 PG/ML (ref 210–950)

## 2025-02-13 PROCEDURE — 83921 ORGANIC ACID SINGLE QUANT: CPT | Performed by: INTERNAL MEDICINE

## 2025-02-13 PROCEDURE — 3288F FALL RISK ASSESSMENT DOCD: CPT | Mod: CPTII,S$GLB,, | Performed by: INTERNAL MEDICINE

## 2025-02-13 PROCEDURE — 99999 PR PBB SHADOW E&M-EST. PATIENT-LVL III: CPT | Mod: PBBFAC,,, | Performed by: INTERNAL MEDICINE

## 2025-02-13 PROCEDURE — 3075F SYST BP GE 130 - 139MM HG: CPT | Mod: CPTII,S$GLB,, | Performed by: INTERNAL MEDICINE

## 2025-02-13 PROCEDURE — 82607 VITAMIN B-12: CPT | Performed by: INTERNAL MEDICINE

## 2025-02-13 PROCEDURE — 84443 ASSAY THYROID STIM HORMONE: CPT | Performed by: INTERNAL MEDICINE

## 2025-02-13 PROCEDURE — 3079F DIAST BP 80-89 MM HG: CPT | Mod: CPTII,S$GLB,, | Performed by: INTERNAL MEDICINE

## 2025-02-13 PROCEDURE — 1157F ADVNC CARE PLAN IN RCRD: CPT | Mod: CPTII,S$GLB,, | Performed by: INTERNAL MEDICINE

## 2025-02-13 PROCEDURE — 1126F AMNT PAIN NOTED NONE PRSNT: CPT | Mod: CPTII,S$GLB,, | Performed by: INTERNAL MEDICINE

## 2025-02-13 PROCEDURE — 1101F PT FALLS ASSESS-DOCD LE1/YR: CPT | Mod: CPTII,S$GLB,, | Performed by: INTERNAL MEDICINE

## 2025-02-13 PROCEDURE — 99215 OFFICE O/P EST HI 40 MIN: CPT | Mod: S$GLB,,, | Performed by: INTERNAL MEDICINE

## 2025-02-13 PROCEDURE — 82746 ASSAY OF FOLIC ACID SERUM: CPT | Performed by: INTERNAL MEDICINE

## 2025-02-13 RX ORDER — PERPHENAZINE 16 MG
600 TABLET ORAL DAILY
Qty: 60 EACH | Refills: 3 | Status: SHIPPED | OUTPATIENT
Start: 2025-02-13

## 2025-02-13 RX ORDER — MEMANTINE HYDROCHLORIDE 5 MG/1
5 TABLET ORAL 2 TIMES DAILY
Qty: 60 TABLET | Refills: 11 | Status: SHIPPED | OUTPATIENT
Start: 2025-02-13 | End: 2026-02-13

## 2025-02-13 NOTE — PROGRESS NOTES
GENERAL NEUROLOGY VISIT   02/13/2025  History:    Patient is a 79 y.o. female with past medical history of epilepsy, CKD, anemia, T2DM, PE (on eliquis) presenting to the clinic for follow up.  History obtained from patient and daughter present today during the visit.  Patient was last seen in the clinic on 04/09/2024.    Interval history 02/13/2025  Keppra has been reduced to 750 mg b.i.d. due to CKD.  No further seizure-like activity since last visit.  She complains of being in significant pain due to CKD.  Continues to have she has with lower back.  Has previously been managed by pain medicine from 2018-23 which did not help.    She also complains of cognition impairment today.  She catches herself with eating quite frequently.  Has trouble with short-term memory mostly remembering daily activities.  Also has trouble with remembering family member's names.  Has family history of dementia in mother.  Does not drive.  Does not cook due to physical limitation with painful hands from arthritis.    Initial history for 09/20/2024  Daughter states that patient started having seizures in 2021. No report of inciting event, etiology unidentified.  She does not get any aura, eyes rolled backwards and has whole-body shaking.  Has had occasional tongue bites, denies any bladder incontinence.  Usually shaking lasts for 1-2 minutes, makes grunting noise.  Postictally is unresponsive for a few minutes, denies agitation with complete return to baseline.  No report of clustering, has not had status epilepticus or intubated for seizures.  Has been on Keppra 500 mg b.i.d., tolerating it well, reports compliance with medication.  Patient does have memory issues as described below, but however uses a pillbox.  Reports tolerating Keppra well.  Patient has had several ER visits with breakthrough seizures, compliance in question.  Most recent ER visit on 03/11/2024 secondary to missed dose with a Keppra level of 16.8 during the visit.   No family history of seizures, no history of febrile seizures, no history of intracranial abnormality/significant trauma.    Patient also endorsing significant lower back pain , going down both hips.  Has significant burning and numbness in both the lower extremities.  Denies falls, has had near falls.  Symptoms are worse in the left leg.  Patient on duloxetine at 60, not working.  Uses cane for ambulation at home.    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, without long-term current use of insulin     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, without long-term current use of insulin     Depression     Diabetes mellitus, type 2     Eye injury as a child     k-abrasion  od    Hyperlipidemia     Hypertension     Hypothyroidism     Immune deficiency disorder     Immune disorder     LOC (loss of consciousness) 2021    at home    Myalgia and myositis 2012    Osteoporosis     Polyneuropathy     Pulmonary embolism 07/10/2021    Renal manifestation of secondary diabetes mellitus     Sarcoidosis     Seizure     Type 2 diabetes mellitus     Ulcer     no cancer    Urinary incontinence        Past Surgical History:   Procedure Laterality Date    CARPAL TUNNEL RELEASE      Rt wrist    CATARACT EXTRACTION W/  INTRAOCULAR LENS IMPLANT Right 2015    Dr. Azevedo    CATARACT EXTRACTION W/  INTRAOCULAR LENS IMPLANT Left 2015    Dr. Azevedo    CERVICAL SPINE SURGERY       SECTION      CHOLECYSTECTOMY      INJECTION OF ANESTHETIC AGENT AROUND NERVE Left 2020    Procedure: BLOCK, NERVE LEFT FEMORAL AND OBTURATOR;  Surgeon: Alfonso Richards MD;  Location: [facility], [place], operated by [facility];   Service: Pain Management;  Laterality: Left;  NEEDS CONSENT    INJECTION OF ANESTHETIC AGENT AROUND NERVE Bilateral 10/09/2023    Procedure: BLOCK, NERVE, BILATERAL L3-L4-L5 MEDIAL BRANCH;  Surgeon: Alfonso Richards MD;  Location: [facility];  Service: Pain Management;  Laterality: Bilateral;    INJECTION OF JOINT Left 03/21/2019    Procedure: Injection, Joint  fLUOROSCOPIC jOINT iNJECTION (hIP iNJECTION) LEFT ROCH BURSA AS WELL LEFT TROCHANTERIC BURSA;  Surgeon: Alfonso Richards MD;  Location: [facility];  Service: Pain Management;  Laterality: Left;  NEEDS CONSENT, DIABETIC    INJECTION OF JOINT Left 07/22/2019    Procedure: Injection, Joint FLUOROSCOPIC JOINT INJECTION (HIP INJECTION) LEFT HIP;  Surgeon: Alfonso Richards MD;  Location: [facility];  Service: Pain Management;  Laterality: Left;  NEEDS CONSENT    INJECTION OF JOINT Left 09/12/2019    Procedure: INJECTION, JOINT;  Surgeon: Alfonso Richards MD;  Location: [facility];  Service: Pain Management;  Laterality: Left;  Left Hip and Left GTB Injections    INJECTION OF JOINT Left 07/27/2020    Procedure: INJECTION, JOINT, LEFT HIP and LEFT GREATER TROCHANTERIC BURSA;  Surgeon: Alfonso Richards MD;  Location: [facility];  Service: Pain Management;  Laterality: Left;  INJECTION, JOINT, LEFT HIP and LEFT GREATER TROCHANTERIC BURSA    INJECTION OF JOINT Left 09/03/2020    Procedure: INJECTION, JOINT, LEFT SI;  Surgeon: Alfonso Richards MD;  Location: [facility];  Service: Pain Management;  Laterality: Left;  INJECTION, JOINT, LEFT SI    TRANSFORAMINAL EPIDURAL INJECTION OF STEROID Bilateral 12/05/2019    Procedure: INJECTION, STEROID, EPIDURAL, TRANSFORAMINAL APPROACH;  Surgeon: Alfonso Richards MD;  Location: [facility];  Service: Pain Management;  Laterality: Bilateral;  B/L TF RHIANNA L5  Consent Needed    TRANSFORAMINAL EPIDURAL INJECTION OF STEROID Bilateral 06/29/2020    Procedure: INJECTION, STEROID, EPIDURAL, TRANSFORAMINAL APPROACH L5/S1;  Surgeon: Alfonso Richards  MD;  Location: [facility];  Service: Pain Management;  Laterality: Bilateral;  B/L TF RHIANNA L5/S1    TUBAL LIGATION         Social History     Socioeconomic History    Marital status:      Spouse name: Gasper     Number of children: 5    Years of education: Business school after high school    Highest education level: 12th grade   Tobacco Use    Smoking status: Never     Passive exposure: Never    Smokeless tobacco: Never   Substance and Sexual Activity    Alcohol use: No    Drug use: No    Sexual activity: Not Currently     Partners: Male     Social Drivers of Health     Financial Resource Strain: Low Risk  (7/30/2024)    Overall Financial Resource Strain (CARDIA)     Difficulty of Paying Living Expenses: Not hard at all   Food Insecurity: No Food Insecurity (7/30/2024)    Hunger Vital Sign     Worried About Running Out of Food in the Last Year: Never true     Ran Out of Food in the Last Year: Never true   Transportation Needs: No Transportation Needs (7/30/2024)    PRAPARE - Transportation     Lack of Transportation (Medical): No     Lack of Transportation (Non-Medical): No   Physical Activity: Inactive (7/30/2024)    Exercise Vital Sign     Days of Exercise per Week: 0 days     Minutes of Exercise per Session: 0 min   Stress: No Stress Concern Present (7/30/2024)    [employer] [place] [employer] - Occupational Stress Questionnaire     Feeling of Stress : Not at all   Housing Stability: Unknown (7/30/2024)    Housing Stability Vital Sign     Unable to Pay for Housing in the Last Year: No     Homeless in the Last Year: No       Review of patient's allergies indicates:   Allergen Reactions    Azathioprine Shortness Of Breath and Other (See Comments)     Fatigue       Current Outpatient Medications on File Prior to Visit   Medication Sig Dispense Refill    ACCU-CHEK FASTCLIX LANCING DEV Kit USE AS DIRECTED. 1 each 0    apixaban (ELIQUIS) 5 mg Tab Take 1 tablet (5 mg total) by mouth 2 (two) times  "daily. Start this prescription after finishing starter pack 60 tablet 5    atorvastatin (LIPITOR) 20 MG tablet Take 1 tablet (20 mg total) by mouth once daily. 90 tablet 1    blood sugar diagnostic (ACCU-CHEK SMARTVIEW TEST STRIP) Strp Use as directed to check blood sugar twice daily. 200 strip 3    blood sugar diagnostic Strp To check BG three times daily, to use with insurance preferred meter 300 each 3    blood-glucose meter kit Use as instructed 1 each 0    carvediloL (COREG) 25 MG tablet Take 1 tablet (25 mg total) by mouth 2 (two) times daily. 180 tablet 3    cetirizine (ZYRTEC) 10 MG tablet Take 1 tablet (10 mg total) by mouth once daily. 30 tablet 3    chlorthalidone (HYGROTEN) 25 MG Tab Take 1 tablet (25 mg total) by mouth once daily. 30 tablet 11    diclofenac sodium (VOLTAREN) 1 % Gel Apply 2 g topically once daily. 100 g 3    DULoxetine (CYMBALTA) 60 MG capsule Take 1 capsule (60 mg total) by mouth once daily. 90 capsule 1    fenofibrate 160 MG Tab Take 1 tablet by mouth once daily 90 tablet 1    folic acid (FOLVITE) 1 MG tablet Take 1 tablet (1,000 mcg total) by mouth once daily. 90 tablet 1    insulin glargine U-100, Lantus, (LANTUS SOLOSTAR U-100 INSULIN) 100 unit/mL (3 mL) InPn pen Inject 30 Units into the skin 2 (two) times a day. 54 mL 1    levETIRAcetam (KEPPRA) 750 MG Tab Take 1 tablet (750 mg total) by mouth 2 (two) times daily. 60 tablet 11    levothyroxine (SYNTHROID) 50 MCG tablet Take 1 tablet (50 mcg total) by mouth before breakfast. 90 tablet 0    linaCLOtide (LINZESS) 72 mcg Cap capsule TAKE 1 CAPSULE BY MOUTH ONCE DAILY BEFORE BREAKFAST 90 capsule 1    NIFEdipine (PROCARDIA-XL) 60 MG (OSM) 24 hr tablet Take 1 tablet (60 mg total) by mouth 2 (two) times a day. 180 tablet 1    pantoprazole (PROTONIX) 40 MG tablet Take 1 tablet (40 mg total) by mouth once daily. 90 tablet 3    pen needle, diabetic (BD ULTRA-FINE ANA PEN NEEDLE) 32 gauge x 5/32" Ndle For use with insulin pen 100 each 3    " predniSONE (DELTASONE) 1 MG tablet TAKE 4 TABLETS BY MOUTH ONCE DAILY FOR  ONE  MONTH  THEN  DECREASE  BY  1  TABLET  EVERY  MONTH  IF  LABS  ALLOW 120 tablet 2    pregabalin (LYRICA) 75 MG capsule Take 1 capsule (75 mg total) by mouth 2 (two) times daily. 60 capsule 5    promethazine-dextromethorphan (PROMETHAZINE-DM) 6.25-15 mg/5 mL Syrp Take one tsp po q 6 hrs prn cough 180 mL 0    tiZANidine (ZANAFLEX) 2 MG tablet Take 2 tablets (4 mg total) by mouth every 8 (eight) hours as needed (muscle spasm). 270 tablet 1    triazolam (HALCION) 0.25 MG Tab       albuterol-ipratropium (DUO-NEB) 2.5 mg-0.5 mg/3 mL nebulizer solution Take 3 mLs by nebulization every 4 (four) hours as needed for Wheezing or Shortness of Breath. Rescue 90 each 11    fluticasone propion-salmeterol 115-21 mcg/dose (ADVAIR HFA) 115-21 mcg/actuation HFAA inhaler Inhale 2 puffs into the lungs every 12 (twelve) hours. Controller 12 g 3    [DISCONTINUED] gabapentin (NEURONTIN) 400 MG capsule Take 1 capsule (400 mg total) by mouth 3 (three) times daily 90 capsule 1     Current Facility-Administered Medications on File Prior to Visit   Medication Dose Route Frequency Provider Last Rate Last Admin    denosumab (PROLIA) injection 60 mg  60 mg Subcutaneous Q6 Months    60 mg at 12/23/24 1048        Family history:  Not contributary    Review Of Systems     Constitutional Negative for fevers, chills, weigh loss   HEENT Negative for hearing loss, dysphagia, sore throat, diplopia   Respiratory Negative for shortness of breath, cough    Cardiovascular Negative for chest pain, palpitations    Gastrointestinal Negative for constipation, diarrhea, early satiety    Skin Negative for rashes    Musculoskeletal Negative for joint pains, myalgias.   Neurological See Above    Psychological Negative for sleep disturbances.    Heme/Lymph Negative for easy bruising, easy bleeding    Endocrine Negative for polyuria, polydypsia     Physical Exam:     Physical Examination  BP  "137/82 (BP Location: Left arm, Patient Position: Sitting)   Pulse 90   Ht 5' 2" (1.575 m)   Wt 68.3 kg (150 lb 9.2 oz)   LMP  (LMP Unknown)   SpO2 98%   BMI 27.54 kg/m²   Body mass index is 27.54 kg/m².      Neurological Exam  Mental Status:   Alert and oriented to name, date, location  MOCA 13/30    CN:   II, III, IV, VI: PERRL, EOMI, no nystagmus, fundus not visualized due to inadequate dilation.V: intact to fine touch, temperature, pain.VII: symmetrical facial movement, nice smile, no drooping.VIII: grossly intact to hearing.    Motor:    ShAb ElbEx ElbFle WrE WrFle Interos  HipFl KnExt KnFlex FtDors FtPlant   Left 5 5 5 5 5 5 5 5 5 5 5 5   Right 5 5 5 5 5 5 5 5 5 5 5 5   Tone: Normal tone in UE and LE, no atrophy, no fasciculation, no tremor no pronator drift.                Reflexes:    Bicep Tricep Brachioradial Patellar Achilles   Left 2+ 2+ 2+ 2+ 1+   Right 2+ 2+ 2+ 2+ 1+   No Clonus, down going toes b/l.    Sensation: on both UEs and LEs    Reduced to light touch and temperature in the left hand, reduced vibration in all 4 extremities    Coordination:    Tremor: Absent.    Gait:    In scooter, not tested    Interval/Previous Work-up:   EEG 07/09/2021: Abnormal EEG due to a mild generalized cerebral dysfunction with regional subcortical dysfunction in the left temporal region and independent seizure foci in the left and right anterior temporal regions with the left being more active. There are no electrographic seizures.     MRI brain w wo con 11/04/2024: Sequela of chronic microvascular disease, chronic small lacunar infarct and remote punctate microhemorrhage about the basal ganglia. No acute intracranial process.     MRI L-spine Aug:  Moderate to severe foraminal narrowing at L3-L4, L5-S1, mild at other levels    Impression:   #hx of seizures  Keppra was reduced to 750 mg b.i.d. for renal adjustments.  Continue on the same dose.  No further seizure-like activity since last visit.  She does not " drive.    #cognitive impairment  MOCA .  Patient is independent of basic ADLs, unable to perform instrumental ADLs.  Has significant symptoms with short-term memory.  Based on the MRI brain results, vascular etiology seems to be the major contributor. ( On Lipitor 20 mg and Eliquis for PE) There is also global volume loss noted on the imaging.  Can not rule out an underlying neurodegenerative condition.  We will obtain blood work as well as neurocognitive testing for further evaluation.  Starting on Namenda 5 mg b.i.d..    #lumbar radiculopathy  Known multilevel L-spine pathology.  Currently on Cymbalta as well as Lyrica.  Still with ongoing symptoms.  Continue both Cymbalta and Lyrica.  We will add alpha lipoic acid.    #hx of CVA  Incidental stroke noted on the MRI.  MRA head and neck.  Etiology consistent with a .  On Eliquis for PE and Lipitor 20 mg daily.  -Goal Parameters for TIA/stroke: LDL<70 mg/dl, HbA1C: <7.0 %,  SBP<130/80 (discussed risk factor optimization in order to reduce the risk of future events with help of PCP)  -Diet and Exercise: Discussed aggressive lifestyle modification. Eat primarily plant-based foods, such as fruits and vegetables, whole grains, legumes (beans) and nuts. Discussed Mediterranean diet. Daily exercise (150 minutes per week).  -Provided education regarding future stroke identification: I went over the usual stroke warning signs and symptoms, and the need to activate EMS (call 911) as soon as the symptoms present including-sudden onset numbness or weakness of the face, arm, or leg; especially on one side of the body; sudden confusion, trouble speaking, or understanding; sudden trouble seeing in one or both eyes; sudden trouble walking; dizziness; loss of coordination.      Plan:   Other cerebral infarction  -     MRA Brain without contrast; Future; Expected date: 2025  -     MRA Neck without contrast; Future; Expected date: 2025    Other amnesia  -      Vitamin B12; Future; Expected date: 02/13/2025  -     Folate; Future; Expected date: 02/13/2025  -     Methylmalonic Acid, Serum; Future; Expected date: 02/13/2025  -     TSH; Future; Expected date: 02/13/2025  -     Ambulatory referral/consult to Adult Neuropsychology; Future; Expected date: 02/20/2025    Other orders  -     alpha lipoic acid 600 mg Cap; Take 600 mg by mouth once daily.  Dispense: 60 each; Refill: 3  -     memantine (NAMENDA) 5 MG Tab; Take 1 tablet (5 mg total) by mouth 2 (two) times daily.  Dispense: 60 tablet; Refill: 11        RTC 6 months or sooner if needed    Time spent on this encounter: 45 minutes. This includes face to face time and non-face to face time preparing to see the patient (eg, review of tests), obtaining and/or reviewing separately obtained history, documenting clinical information in the electronic or other health record, independently interpreting results and communicating results to the patient/family/caregiver, or care coordinator.     Pauline Knapp MD  Neurology

## 2025-02-14 ENCOUNTER — OFFICE VISIT (OUTPATIENT)
Dept: URGENT CARE | Facility: CLINIC | Age: 80
End: 2025-02-14
Payer: MEDICARE

## 2025-02-14 VITALS
BODY MASS INDEX: 27.7 KG/M2 | DIASTOLIC BLOOD PRESSURE: 77 MMHG | HEART RATE: 76 BPM | TEMPERATURE: 99 F | WEIGHT: 150.56 LBS | SYSTOLIC BLOOD PRESSURE: 162 MMHG | RESPIRATION RATE: 16 BRPM | OXYGEN SATURATION: 98 % | HEIGHT: 62 IN

## 2025-02-14 DIAGNOSIS — B37.81 THRUSH OF MOUTH AND ESOPHAGUS: Primary | ICD-10-CM

## 2025-02-14 DIAGNOSIS — E11.3292 CONTROLLED TYPE 2 DIABETES MELLITUS WITH LEFT EYE AFFECTED BY MILD NONPROLIFERATIVE RETINOPATHY WITHOUT MACULAR EDEMA, WITHOUT LONG-TERM CURRENT USE OF INSULIN: Chronic | ICD-10-CM

## 2025-02-14 DIAGNOSIS — I10 ESSENTIAL HYPERTENSION: Chronic | ICD-10-CM

## 2025-02-14 DIAGNOSIS — B37.0 THRUSH OF MOUTH AND ESOPHAGUS: Primary | ICD-10-CM

## 2025-02-14 DIAGNOSIS — D86.9 SARCOIDOSIS: Chronic | ICD-10-CM

## 2025-02-14 RX ORDER — NYSTATIN 100000 [USP'U]/ML
4 SUSPENSION ORAL 4 TIMES DAILY
Qty: 160 ML | Refills: 0 | Status: SHIPPED | OUTPATIENT
Start: 2025-02-14 | End: 2025-02-24

## 2025-02-14 NOTE — PROGRESS NOTES
"Subjective:      Patient ID: Regino Lawrence is a 79 y.o. female.    Vitals:  height is 5' 2" (1.575 m) and weight is 68.3 kg (150 lb 9.2 oz). Her oral temperature is 99.4 °F (37.4 °C). Her blood pressure is 162/77 (abnormal) and her pulse is 76. Her respiration is 16 and oxygen saturation is 98%.     Chief Complaint: Sore Throat    Pt presents to the clinic c/o sore throat, white spots on tonsils, and white spots on her bottom gums under dentures.  She stated the sx started 2 days ago.  She has tried Orajel, but it did not provide any relief.      Patient provider note starts here:  Patient presents with her daughter for complaints of oral pain and sore throat. Reports that she initially started with an irritated spot under her dentures on the left lower side of the mouth. She has noticed some white spots appear to the tongue and posterior oropharynx and has since developed a sore throat as well. She has been using orajel without sustained relief.     Sore Throat   This is a new problem. The current episode started in the past 7 days (2 days). The problem has been unchanged. Neither side of throat is experiencing more pain than the other. There has been no fever. The pain is at a severity of 10/10. The pain is moderate. Pertinent negatives include no abdominal pain, diarrhea, neck pain or vomiting. Treatments tried: oraljel. The treatment provided no relief.       Constitution: Negative for fever.   HENT:  Positive for mouth sores, tongue pain and sore throat.    Neck: Negative for neck pain.   Cardiovascular:  Negative for chest pain, palpitations and sob on exertion.   Respiratory:  Negative for chest tightness and wheezing.    Gastrointestinal:  Negative for abdominal pain, vomiting and diarrhea.   Skin:  Negative for color change, wound and erythema.   Neurological:  Negative for numbness and tingling.      Objective:     Physical Exam   Constitutional: She is oriented to person, place, and time. She appears " well-developed.  Non-toxic appearance. She does not appear ill. No distress.   HENT:   Head: Normocephalic and atraumatic. Head is without abrasion, without contusion and without laceration.   Ears:   Right Ear: External ear normal.   Left Ear: External ear normal.   Nose: Nose normal.   Mouth/Throat: Mucous membranes are normal. Oropharyngeal exudate present.   Eyes: Conjunctivae, EOM and lids are normal. Pupils are equal, round, and reactive to light.   Neck: Trachea normal and phonation normal. Neck supple.   Cardiovascular: Normal rate.   Pulmonary/Chest: Effort normal. No stridor. No respiratory distress.   Musculoskeletal: Normal range of motion.         General: Normal range of motion.   Neurological: She is alert and oriented to person, place, and time.   Skin: Skin is warm, dry, intact and no rash. Capillary refill takes less than 2 seconds. No abrasion, No burn, No bruising, No erythema and No ecchymosis   Psychiatric: Her speech is normal and behavior is normal. Judgment and thought content normal.   Nursing note and vitals reviewed.      Assessment:     1. Thrush of mouth and esophagus    2. 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 Essential hypertension    4. Sarcoidosis        Plan:       Thrush of mouth and esophagus  -     nystatin (MYCOSTATIN) 100,000 unit/mL suspension; Take 4 mLs (400,000 Units total) by mouth 4 (four) times daily. 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  Old Records Summarized: records from clinic visits.  Urgent Care Management:  A. Problem  List:   -Acute: Oral thrush    -Chronic: DMII, HLD, HTN, CKD, immune deficiency disorder, sarcoidosis    B. Differential diagnosis: Viral pharyngitis, strep pharyngitis, allergic rhinitis, post nasal drip, retropharyngeal abscess, peritonsillar abscess, oral thrush   C. Diagnostic Testing Ordered: None  D. Diagnostic Testing Considered: None  E. Independent Historians: None  F. Urgent Care Midlevel Independent Results Interpretation:   G. Radiology:  H. Review of Previous Medical Records:  I. Home Medications Reviewed  J. Social Determinants of Health considered  K. Medical Decision Making and Disposition: Patient presents with complaints of having oral pain and white spots in her mouth x2 days. On exam, she is afebrile and nontoxic appearing. Exam findings are consistent with oral thrush. She was prescribed oral nystatin and encouraged close follow-up with PCP. ED precautions discussed, she verbalized understanding and agreed with plan.

## 2025-02-14 NOTE — PATIENT INSTRUCTIONS
Thank you for choosing [facility]!     Our goal in the Urgent Care is to always provide outstanding medical care. You may receive a survey by mail or e-mail in the next week regarding your experience today. We would greatly appreciate you completing and returning the survey. Your feedback provides us with a way to recognize our staff who provide very good care, and it helps us learn how to improve when your experience was below our aspiration of excellence.       We appreciate you trusting us with your medical care. We hope you feel better soon. We will be happy to take care of you for all of your future medical needs.    You must understand that you've received an Urgent Care treatment only and that you may be released before all your medical problems are known or treated. You, the patient, will arrange for follow up care as instructed.      Follow up with your PCP or specialty clinic as instructed in the next 2-3 days if not improved or as needed. You can call [phone] to schedule an appointment with appropriate provider.      If you condition worsens, we recommend that you receive another evaluation at the emergency room immediately or contact your primary medical clinic's after hours call service to discuss your concerns.      Please return here or go to the Emergency Department for any concerns or worsening condition.

## 2025-02-17 ENCOUNTER — OFFICE VISIT (OUTPATIENT)
Dept: GASTROENTEROLOGY | Facility: CLINIC | Age: 80
End: 2025-02-17
Payer: MEDICARE

## 2025-02-17 ENCOUNTER — TELEPHONE (OUTPATIENT)
Dept: PHARMACY | Facility: CLINIC | Age: 80
End: 2025-02-17
Payer: MEDICARE

## 2025-02-17 VITALS
HEART RATE: 98 BPM | WEIGHT: 148.69 LBS | HEIGHT: 62 IN | DIASTOLIC BLOOD PRESSURE: 83 MMHG | SYSTOLIC BLOOD PRESSURE: 129 MMHG | BODY MASS INDEX: 27.36 KG/M2

## 2025-02-17 DIAGNOSIS — K59.04 CHRONIC IDIOPATHIC CONSTIPATION: Primary | ICD-10-CM

## 2025-02-17 NOTE — PATIENT INSTRUCTIONS
Ordered EGD and colonoscopy.  Will send a message to our pharmacy assistance team to try & get the Linzess    Constipation Tips  - Eat more high fiber foods   - Take a Fiber supplement (Metamucil, Benefiber, Citrucell, etc)  - Take Miralax (once, twice, or three times a day)  - Drink at least 8 cups of water a day  - Get regular physical activity  - Use a stool or Squatty Potty  - Avoid sitting on the toilet >5 minutes to prevent hemorrhoids    Try IBgard for bloating/gas/cramping pain

## 2025-02-17 NOTE — PROGRESS NOTES
"    [facility] Consultation Note    Reason for Consult:  The encounter diagnosis was Chronic idiopathic constipation.    PCP:   Micaela Mendoza   [address] / [place] [address]    Referring MD:  Micaela Mendoza Md  [address]  [address]  [place] [address]    HPI:  This is a 79 y.o. female with PE, anemia in CKD, GERD, DM, HTN, HLD, and Hypothyroidism here for evaluation of chronic constipation, generalized abdominal pain and bloating. States she was previously taking Linzess 72 mcg every 2 days with daily BM. However, she had to discontinue about a year ago because her insurance no longer covered the drug. She has since been taking Senna and Colace with minimal improvement. States she can go days-weeks between BM. Her LBM was 2 days ago and hard, pebble-like stool. States her abdomen has never been this bloated, which does improve with BM.      Endorses reduced appetite and dysphagia. States she feels her food sit in her chest and becomes painful. It will either eventually pass or she will regurgitate it back up. Hx of GERD, well controlled on Protonix daily. Pt denies N/V, diarrhea, bloody stools, rectal bleeding, melena, or unintentional weight loss. Denies frequent NSAID use. Denies tobacco and alcohol use. Denies known FHx of GI malignancies. Last colonoscopy in 2015 w recommended repeat in 10 years. Pt would like to get another cscope.       Objective Findings:    Vital Signs:  /83   Pulse 98   Ht 5' 2" (1.575 m)   Wt 67.4 kg (148 lb 11.2 oz)   LMP  (LMP Unknown)   BMI 27.20 kg/m²   Body mass index is 27.2 kg/m².    Physical Exam:  General Appearance: Well appearing in no acute distress  Abdomen: Generalized distension. Soft, non tender in all four quadrants. No hepatosplenomegaly, ascites, or mass.    I have personally reviewed labs and imaging results.     CBC: Hgb 11.3 (around her baseline)  Iron, TIBC, Transferrin: wnl    US Abdomen Complete " (10/07/2024):  Impression:   Probable hepatic steatosis.   Status post cholecystectomy.    Assessment:  1. Chronic idiopathic constipation      This is a 79 y.o F with chronic constipation here for eval of worsening constipation. She was unable to continue Linzess that prev controlled her symptoms due to cost. Will re-prescribe and send message to pharmacy assistance team to try and get pt her medication.     - Ordered EGD/Colonoscopy  - Prescribed Linzess. Will send message to PAT  - Recommended Miralax and fiber in the meantime.   - recommended IBgard for bloating/gas/pain    RTC 3 months.     Order summary:  Orders Placed This Encounter    linaCLOtide (LINZESS) 72 mcg Cap capsule     Thank you so much for allowing me to participate in the care of Arletha M White Sarah Abukhader, PA-C [facility]

## 2025-02-17 NOTE — TELEPHONE ENCOUNTER
We have reached out to Mrs. Lawrence via letter to inform her of the [place] Self Representation Document Preparation [employer] application process for Linzess. A follow-up phone call will be made in 5 business days.    Nella Love  Pharmacy Patient Assistance Team

## 2025-02-17 NOTE — LETTER
February 17, 2025    Arfritz Lawrence  [address] [place] [address]               Dear Mrs. Lawrence,      My name is Nella Love  I am reaching out on behalf of [facility] Patient Assistance Team in regards to your request for medication assistance. Our goal is to assist qualified [facility] patients with obtaining financial assistance for prescribed medications.     Please note that enrollment into available support may require the following documents:      Proof of household Income (such as social security award letter, pension statement,1040)  Copy of all insurance cards (front and back)  Print out from your insurance or pharmacy showing how much you have spent on prescriptions for the current year  Completed Medication Access Center Authorization Forms        Please reach out to my phone number below if you are still in need of assistance with your medications. Follow-up call will be made in 5 business days. We look forward to hearing from you soon!    Thank you for choosing [facility] for your healthcare needs      Sincerely  Nella PATTON @[phone]  Pharmacy Patient Assistance Team  [address] [place] [address]  [address] [place] [address]  Fax: [phone]  Email: [email or web address]

## 2025-02-17 NOTE — TELEPHONE ENCOUNTER
----- Message from Kelli Bolivar PA-C sent at 2/17/2025  3:21 PM CST -----  Regarding: Linzess  Hello! This patient with chronic idiopathic constipation was recently doing well on Linzess 72 mcg once daily. However, she reports her insurance no longer covers the drug and she can't afford it. Please advise. Thank you!

## 2025-02-18 LAB — METHYLMALONATE SERPL-SCNC: 0.35 UMOL/L

## 2025-02-20 ENCOUNTER — TELEPHONE (OUTPATIENT)
Dept: FAMILY MEDICINE | Facility: CLINIC | Age: 80
End: 2025-02-20

## 2025-02-20 ENCOUNTER — PATIENT MESSAGE (OUTPATIENT)
Dept: RHEUMATOLOGY | Facility: CLINIC | Age: 80
End: 2025-02-20
Payer: MEDICARE

## 2025-02-20 ENCOUNTER — OFFICE VISIT (OUTPATIENT)
Dept: FAMILY MEDICINE | Facility: CLINIC | Age: 80
End: 2025-02-20
Payer: MEDICARE

## 2025-02-20 VITALS
WEIGHT: 153 LBS | TEMPERATURE: 98 F | SYSTOLIC BLOOD PRESSURE: 154 MMHG | BODY MASS INDEX: 28.16 KG/M2 | HEIGHT: 62 IN | OXYGEN SATURATION: 98 % | HEART RATE: 92 BPM | RESPIRATION RATE: 18 BRPM | DIASTOLIC BLOOD PRESSURE: 78 MMHG

## 2025-02-20 DIAGNOSIS — F41.1 GENERALIZED ANXIETY DISORDER: ICD-10-CM

## 2025-02-20 DIAGNOSIS — I10 ESSENTIAL HYPERTENSION: Chronic | ICD-10-CM

## 2025-02-20 DIAGNOSIS — K12.0 APHTHOUS ULCER: ICD-10-CM

## 2025-02-20 DIAGNOSIS — K12.0 APHTHOUS ULCER: Primary | ICD-10-CM

## 2025-02-20 DIAGNOSIS — R21 RASH OF NECK: ICD-10-CM

## 2025-02-20 RX ORDER — CLOTRIMAZOLE 1 %
CREAM (GRAM) TOPICAL 2 TIMES DAILY
Qty: 45 G | Refills: 0 | Status: SHIPPED | OUTPATIENT
Start: 2025-02-20

## 2025-02-20 NOTE — PROGRESS NOTES
Routine Office Visit    Patient Name: Regino Lawrence    : 1945  MRN: [id number]    Chief Complaint:  Rash, mouth pain    Subjective:  Regino is a 79 y.o. female who presents today for:    Rash, mouth pain - patient who is new to me with medical problems as documented below reports today for evaluation.  She has sarcoidosis on chronic steroids.  Reports in the last week developing some mouth sores due to her dentures.  She went to urgent care and was given nystatin ointment which has not helped greatly.  She has also noticed a mildly itchy rash in the left side of her neck which has been scaling as well.  She has used an over-the-counter psoriasis cream without significant benefit.  Denies fevers or chills.    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, without long-term current use of insulin     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, without long-term current use of insulin     Depression     Diabetes mellitus, type 2     Eye injury as a child     k-abrasion  od    Hyperlipidemia     Hypertension     Hypothyroidism     Immune deficiency disorder     Immune disorder     LOC (loss of consciousness) 2021    at home    Myalgia and myositis 2012    Osteoporosis     Polyneuropathy     Pulmonary embolism 07/10/2021    Renal manifestation of secondary diabetes mellitus     Sarcoidosis     Seizure     Type 2 diabetes mellitus     Ulcer     no cancer    Urinary incontinence        Family History  Family History   Problem Relation Name Age of Onset    Hypertension Mother Martial     Cataracts Mother Martial     No Known Problems Father      Hypertension Maternal  "Grandmother Nora     Glaucoma Sister Cristina     Arthritis Sister Cristina     No Known Problems Brother Kofi     No Known Problems Maternal Aunt      No Known Problems Maternal Uncle      No Known Problems Paternal Aunt      No Known Problems Paternal Uncle      No Known Problems Maternal Grandfather      No Known Problems Paternal Grandmother      No Known Problems Paternal Grandfather      Kidney failure Sister Rita     Hepatitis Sister Deepali     Cancer Sister Deepali         bone cancer     Immunodeficiency Sister Christiana     Diabetes Son x4     Hypertension Son x4     Lupus Neg Hx      Rheum arthritis Neg Hx      Amblyopia Neg Hx      Blindness Neg Hx      Macular degeneration Neg Hx      Retinal detachment Neg Hx      Strabismus Neg Hx      Stroke Neg Hx      Thyroid disease Neg Hx      Endometrial cancer Neg Hx      Vaginal cancer Neg Hx      Cervical cancer Neg Hx         Current Medications  Medications Ordered Prior to Encounter[1]    Allergies   Review of patient's allergies indicates:   Allergen Reactions    Azathioprine Shortness Of Breath and Other (See Comments)     Fatigue       Review of Systems (Pertinent positives)  Review of Systems   Constitutional:  Negative for chills, fever and weight loss.   HENT:  Negative for congestion, sinus pain and sore throat.    Eyes: Negative.    Respiratory:  Negative for cough, hemoptysis, sputum production and stridor.    Cardiovascular: Negative.    Gastrointestinal: Negative.    Genitourinary: Negative.    Skin:  Positive for itching and rash.   Neurological: Negative.    Psychiatric/Behavioral: Negative.         BP (!) 154/78 (BP Location: Left arm, Patient Position: Sitting)   Pulse 92   Temp 97.5 °F (36.4 °C) (Oral) Comment: pt has mouth pain can not take temp.  Resp 18   Ht 5' 2" (1.575 m)   Wt 69.4 kg (153 lb)   LMP  (LMP Unknown)   SpO2 98%   BMI 27.98 kg/m²     Physical Exam  Vitals reviewed.   Constitutional:       General: She is not in " acute distress.     Appearance: Normal appearance. She is not ill-appearing, toxic-appearing or diaphoretic.   HENT:      Head: Normocephalic and atraumatic.      Mouth/Throat:      Mouth: Mucous membranes are moist.      Pharynx: No oropharyngeal exudate or posterior oropharyngeal erythema.      Comments: No thrush noted on examination.  Small aphthous ulcer noted near lingual frenulum  Cardiovascular:      Rate and Rhythm: Normal rate and regular rhythm.      Pulses: Normal pulses.      Heart sounds: Normal heart sounds.   Pulmonary:      Effort: Pulmonary effort is normal. No respiratory distress.      Breath sounds: Normal breath sounds. No wheezing.   Musculoskeletal:         General: No swelling, tenderness or deformity. Normal range of motion.   Skin:     General: Skin is warm and dry.      Capillary Refill: Capillary refill takes less than 2 seconds.      Comments: Circular hypopigmented patches noted left-sided neck.  No overt scaling.  No erythema   Neurological:      General: No focal deficit present.      Mental Status: She is alert and oriented to person, place, and time.   Psychiatric:         Mood and Affect: Mood normal.         Behavior: Behavior normal.            Assessment/Plan:  Regino Lawrence is a 79 y.o. female who presents today for :    Regino was seen today for dental pain and rash.    Diagnoses and all orders for this visit:    Aphthous ulcer  -     (Magic mouthwash) 1:1:1 diphenhydrAMINE(Benadryl) 12.5mg/5ml liq, aluminum & magnesium hydroxide-simethicone (Maalox), LIDOcaine viscous 2%; Swish and spit 5 mLs every 4 (four) hours as needed (mouth pain). for mouth sores    Recommended patient to discontinue nystatin.  No evidence of thrush on exam.  Treat with magic mouthwash.  Potential for numbness discussed.  Can use salt water gargles as needed as well.    Rash of neck  -     clotrimazole (LOTRIMIN) 1 % cream; Apply topically 2 (two) times daily.    Treat with clotrimazole cream.   Recommended patient to avoid steroids to face.  Can consult dermatology if no improvement.  Discussed with patient that head and shoulders shampoo may help as well.    Essential hypertension    Mildly elevated today although patient did take her blood pressure medications during her visit today.  She reports that she is monitored via the [employer] medicine program.    Generalized anxiety disorder    Patient denies any acute concerns with her mood.    All questions answered.  Follow up with me as needed recommended.        This office note has been dictated.  This dictation has been generated using M-Modal Fluency Direct dictation; some phonetic errors may occur.          [1]   Current Outpatient Medications on File Prior to Visit   Medication Sig Dispense Refill    ACCU-CHEK FASTCLIX LANCING DEV Kit USE AS DIRECTED. 1 each 0    alpha lipoic acid 600 mg Cap Take 600 mg by mouth once daily. 60 each 3    apixaban (ELIQUIS) 5 mg Tab Take 1 tablet (5 mg total) by mouth 2 (two) times daily. Start this prescription after finishing starter pack 60 tablet 5    atorvastatin (LIPITOR) 20 MG tablet Take 1 tablet (20 mg total) by mouth once daily. 90 tablet 1    blood sugar diagnostic (ACCU-CHEK SMARTVIEW TEST STRIP) Strp Use as directed to check blood sugar twice daily. 200 strip 3    blood sugar diagnostic Strp To check BG three times daily, to use with insurance preferred meter 300 each 3    blood-glucose meter kit Use as instructed 1 each 0    carvediloL (COREG) 25 MG tablet Take 1 tablet (25 mg total) by mouth 2 (two) times daily. 180 tablet 3    cetirizine (ZYRTEC) 10 MG tablet Take 1 tablet (10 mg total) by mouth once daily. 30 tablet 3    chlorthalidone (HYGROTEN) 25 MG Tab Take 1 tablet (25 mg total) by mouth once daily. 30 tablet 11    diclofenac sodium (VOLTAREN) 1 % Gel Apply 2 g topically once daily. 100 g 3    DULoxetine (CYMBALTA) 60 MG capsule Take 1 capsule (60 mg total) by mouth once daily. 90 capsule 1     "fenofibrate 160 MG Tab Take 1 tablet by mouth once daily 90 tablet 1    folic acid (FOLVITE) 1 MG tablet Take 1 tablet (1,000 mcg total) by mouth once daily. 90 tablet 1    insulin glargine U-100, Lantus, (LANTUS SOLOSTAR U-100 INSULIN) 100 unit/mL (3 mL) InPn pen Inject 30 Units into the skin 2 (two) times a day. 54 mL 1    levETIRAcetam (KEPPRA) 750 MG Tab Take 1 tablet (750 mg total) by mouth 2 (two) times daily. 60 tablet 11    levothyroxine (SYNTHROID) 50 MCG tablet Take 1 tablet (50 mcg total) by mouth before breakfast. 90 tablet 0    linaCLOtide (LINZESS) 72 mcg Cap capsule Take 1 capsule (72 mcg total) by mouth before breakfast. 90 capsule 3    memantine (NAMENDA) 5 MG Tab Take 1 tablet (5 mg total) by mouth 2 (two) times daily. 60 tablet 11    NIFEdipine (PROCARDIA-XL) 60 MG (OSM) 24 hr tablet Take 1 tablet (60 mg total) by mouth 2 (two) times a day. 180 tablet 1    nystatin (MYCOSTATIN) 100,000 unit/mL suspension Take 4 mLs (400,000 Units total) by mouth 4 (four) times daily. for 10 days 160 mL 0    pantoprazole (PROTONIX) 40 MG tablet Take 1 tablet (40 mg total) by mouth once daily. 90 tablet 3    pen needle, diabetic (BD ULTRA-FINE ANA PEN NEEDLE) 32 gauge x 5/32" Ndle For use with insulin pen 100 each 3    predniSONE (DELTASONE) 1 MG tablet TAKE 4 TABLETS BY MOUTH ONCE DAILY FOR  ONE  MONTH  THEN  DECREASE  BY  1  TABLET  EVERY  MONTH  IF  LABS  ALLOW 120 tablet 2    pregabalin (LYRICA) 75 MG capsule Take 1 capsule (75 mg total) by mouth 2 (two) times daily. 60 capsule 5    promethazine-dextromethorphan (PROMETHAZINE-DM) 6.25-15 mg/5 mL Syrp Take one tsp po q 6 hrs prn cough 180 mL 0    tiZANidine (ZANAFLEX) 2 MG tablet Take 2 tablets (4 mg total) by mouth every 8 (eight) hours as needed (muscle spasm). 270 tablet 1    triazolam (HALCION) 0.25 MG Tab       albuterol-ipratropium (DUO-NEB) 2.5 mg-0.5 mg/3 mL nebulizer solution Take 3 mLs by nebulization every 4 (four) hours as needed for Wheezing or " Shortness of Breath. Rescue 90 each 11    fluticasone propion-salmeterol 115-21 mcg/dose (ADVAIR HFA) 115-21 mcg/actuation HFAA inhaler Inhale 2 puffs into the lungs every 12 (twelve) hours. Controller 12 g 3    [DISCONTINUED] gabapentin (NEURONTIN) 400 MG capsule Take 1 capsule (400 mg total) by mouth 3 (three) times daily 90 capsule 1     Current Facility-Administered Medications on File Prior to Visit   Medication Dose Route Frequency Provider Last Rate Last Admin    denosumab (PROLIA) injection 60 mg  60 mg Subcutaneous Q6 Months    60 mg at 12/23/24 1739

## 2025-02-21 ENCOUNTER — INFUSION (OUTPATIENT)
Dept: INFUSION THERAPY | Facility: HOSPITAL | Age: 80
End: 2025-02-21
Attending: INTERNAL MEDICINE
Payer: MEDICARE

## 2025-02-21 ENCOUNTER — LAB VISIT (OUTPATIENT)
Dept: LAB | Facility: HOSPITAL | Age: 80
End: 2025-02-21
Attending: INTERNAL MEDICINE
Payer: MEDICARE

## 2025-02-21 DIAGNOSIS — D63.1 ANEMIA IN CHRONIC KIDNEY DISEASE, UNSPECIFIED CKD STAGE: ICD-10-CM

## 2025-02-21 DIAGNOSIS — N18.9 ANEMIA IN CHRONIC KIDNEY DISEASE, UNSPECIFIED CKD STAGE: ICD-10-CM

## 2025-02-21 LAB
BASOPHILS # BLD AUTO: 0.03 K/UL (ref 0–0.2)
BASOPHILS NFR BLD: 0.9 % (ref 0–1.9)
DIFFERENTIAL METHOD BLD: ABNORMAL
EOSINOPHIL # BLD AUTO: 0.1 K/UL (ref 0–0.5)
EOSINOPHIL NFR BLD: 2.8 % (ref 0–8)
ERYTHROCYTE [DISTWIDTH] IN BLOOD BY AUTOMATED COUNT: 13.7 % (ref 11.5–14.5)
FERRITIN SERPL-MCNC: 825 NG/ML (ref 20–300)
HCT VFR BLD AUTO: 33.1 % (ref 37–48.5)
HGB BLD-MCNC: 11.2 G/DL (ref 12–16)
IMM GRANULOCYTES # BLD AUTO: 0.02 K/UL (ref 0–0.04)
IMM GRANULOCYTES NFR BLD AUTO: 0.6 % (ref 0–0.5)
IRON SERPL-MCNC: 103 UG/DL (ref 30–160)
LYMPHOCYTES # BLD AUTO: 1 K/UL (ref 1–4.8)
LYMPHOCYTES NFR BLD: 29.8 % (ref 18–48)
MCH RBC QN AUTO: 34.7 PG (ref 27–31)
MCHC RBC AUTO-ENTMCNC: 33.8 G/DL (ref 32–36)
MCV RBC AUTO: 103 FL (ref 82–98)
MONOCYTES # BLD AUTO: 0.5 K/UL (ref 0.3–1)
MONOCYTES NFR BLD: 15.2 % (ref 4–15)
NEUTROPHILS # BLD AUTO: 1.6 K/UL (ref 1.8–7.7)
NEUTROPHILS NFR BLD: 50.7 % (ref 38–73)
NRBC BLD-RTO: 0 /100 WBC
PLATELET # BLD AUTO: 281 K/UL (ref 150–450)
PMV BLD AUTO: 8.6 FL (ref 9.2–12.9)
RBC # BLD AUTO: 3.23 M/UL (ref 4–5.4)
SATURATED IRON: 31 % (ref 20–50)
TOTAL IRON BINDING CAPACITY: 334 UG/DL (ref 250–450)
TRANSFERRIN SERPL-MCNC: 226 MG/DL (ref 200–375)
WBC # BLD AUTO: 3.22 K/UL (ref 3.9–12.7)

## 2025-02-21 PROCEDURE — 36415 COLL VENOUS BLD VENIPUNCTURE: CPT | Performed by: INTERNAL MEDICINE

## 2025-02-21 PROCEDURE — 84466 ASSAY OF TRANSFERRIN: CPT | Performed by: INTERNAL MEDICINE

## 2025-02-21 PROCEDURE — 82728 ASSAY OF FERRITIN: CPT | Performed by: INTERNAL MEDICINE

## 2025-02-21 PROCEDURE — 85025 COMPLETE CBC W/AUTO DIFF WBC: CPT | Performed by: INTERNAL MEDICINE

## 2025-02-21 NOTE — TELEPHONE ENCOUNTER
Staff to see if the patient can come in Monday at 8:00 a.m. as a double book.  They should let her know there may be await but I will see her as soon as possible.

## 2025-02-21 NOTE — PLAN OF CARE
Pt arrived via scooter with family member, Megan. Pt's H&H 11.2/33.1. Retacrti not required. Pt informed that Retacrit not required for this visit.. Pt verbalized understanding. Pt left via scooter in NAD.

## 2025-02-26 ENCOUNTER — HOSPITAL ENCOUNTER (OUTPATIENT)
Dept: RADIOLOGY | Facility: HOSPITAL | Age: 80
Discharge: HOME | End: 2025-02-26
Attending: INTERNAL MEDICINE
Payer: MEDICARE

## 2025-02-26 DIAGNOSIS — I63.89 OTHER CEREBRAL INFARCTION: ICD-10-CM

## 2025-02-26 PROCEDURE — 70544 MR ANGIOGRAPHY HEAD W/O DYE: CPT | Mod: TC,HCNC

## 2025-02-26 PROCEDURE — 70547 MR ANGIOGRAPHY NECK W/O DYE: CPT | Mod: TC,HCNC

## 2025-03-03 ENCOUNTER — PATIENT MESSAGE (OUTPATIENT)
Dept: PHARMACY | Facility: CLINIC | Age: 80
End: 2025-03-03
Payer: MEDICARE

## 2025-03-06 DIAGNOSIS — E03.9 HYPOTHYROIDISM, UNSPECIFIED TYPE: ICD-10-CM

## 2025-03-06 RX ORDER — LEVOTHYROXINE SODIUM 50 UG/1
50 TABLET ORAL
Qty: 90 TABLET | Refills: 0 | OUTPATIENT
Start: 2025-03-06

## 2025-03-06 RX ORDER — LEVOTHYROXINE SODIUM 50 UG/1
50 TABLET ORAL
Qty: 90 TABLET | Refills: 2 | Status: SHIPPED | OUTPATIENT
Start: 2025-03-06

## 2025-03-06 NOTE — TELEPHONE ENCOUNTER
No care due was identified.  [facility] Embedded Care Due Messages. Reference number: [id number].   3/06/2025 2:22:55 PM CST

## 2025-03-06 NOTE — TELEPHONE ENCOUNTER
No care due was identified.  [facility] Embedded Care Due Messages. Reference number: [id number].   3/06/2025 2:30:08 PM CST

## 2025-03-07 ENCOUNTER — LAB VISIT (OUTPATIENT)
Dept: LAB | Facility: HOSPITAL | Age: 80
End: 2025-03-07
Attending: INTERNAL MEDICINE
Payer: MEDICARE

## 2025-03-07 ENCOUNTER — INFUSION (OUTPATIENT)
Dept: INFUSION THERAPY | Facility: HOSPITAL | Age: 80
End: 2025-03-07
Attending: INTERNAL MEDICINE
Payer: MEDICARE

## 2025-03-07 VITALS
TEMPERATURE: 99 F | SYSTOLIC BLOOD PRESSURE: 114 MMHG | DIASTOLIC BLOOD PRESSURE: 56 MMHG | RESPIRATION RATE: 18 BRPM | OXYGEN SATURATION: 95 % | HEART RATE: 90 BPM

## 2025-03-07 DIAGNOSIS — N18.9 ANEMIA IN CHRONIC KIDNEY DISEASE, UNSPECIFIED CKD STAGE: ICD-10-CM

## 2025-03-07 DIAGNOSIS — D63.1 ANEMIA IN CHRONIC KIDNEY DISEASE, UNSPECIFIED CKD STAGE: ICD-10-CM

## 2025-03-07 DIAGNOSIS — D50.9 IRON DEFICIENCY ANEMIA, UNSPECIFIED IRON DEFICIENCY ANEMIA TYPE: ICD-10-CM

## 2025-03-07 DIAGNOSIS — D63.1 ANEMIA IN STAGE 3 CHRONIC KIDNEY DISEASE: Primary | ICD-10-CM

## 2025-03-07 DIAGNOSIS — D63.1 ANEMIA IN STAGE 3 CHRONIC KIDNEY DISEASE, UNSPECIFIED WHETHER STAGE 3A OR 3B CKD: ICD-10-CM

## 2025-03-07 DIAGNOSIS — N18.30 ANEMIA IN STAGE 3 CHRONIC KIDNEY DISEASE: Primary | ICD-10-CM

## 2025-03-07 DIAGNOSIS — N18.30 ANEMIA IN STAGE 3 CHRONIC KIDNEY DISEASE, UNSPECIFIED WHETHER STAGE 3A OR 3B CKD: ICD-10-CM

## 2025-03-07 LAB
BASOPHILS # BLD AUTO: 0.02 K/UL (ref 0–0.2)
BASOPHILS NFR BLD: 0.5 % (ref 0–1.9)
DIFFERENTIAL METHOD BLD: ABNORMAL
EOSINOPHIL # BLD AUTO: 0.1 K/UL (ref 0–0.5)
EOSINOPHIL NFR BLD: 2.9 % (ref 0–8)
ERYTHROCYTE [DISTWIDTH] IN BLOOD BY AUTOMATED COUNT: 12.5 % (ref 11.5–14.5)
FERRITIN SERPL-MCNC: 926 NG/ML (ref 20–300)
HCT VFR BLD AUTO: 29.8 % (ref 37–48.5)
HGB BLD-MCNC: 10.6 G/DL (ref 12–16)
IMM GRANULOCYTES # BLD AUTO: 0.03 K/UL (ref 0–0.04)
IMM GRANULOCYTES NFR BLD AUTO: 0.8 % (ref 0–0.5)
IRON SERPL-MCNC: 89 UG/DL (ref 30–160)
LYMPHOCYTES # BLD AUTO: 0.9 K/UL (ref 1–4.8)
LYMPHOCYTES NFR BLD: 23.3 % (ref 18–48)
MCH RBC QN AUTO: 34.3 PG (ref 27–31)
MCHC RBC AUTO-ENTMCNC: 35.6 G/DL (ref 32–36)
MCV RBC AUTO: 96 FL (ref 82–98)
MONOCYTES # BLD AUTO: 0.4 K/UL (ref 0.3–1)
MONOCYTES NFR BLD: 10.7 % (ref 4–15)
NEUTROPHILS # BLD AUTO: 2.4 K/UL (ref 1.8–7.7)
NEUTROPHILS NFR BLD: 61.8 % (ref 38–73)
NRBC BLD-RTO: 0 /100 WBC
PLATELET # BLD AUTO: 278 K/UL (ref 150–450)
PMV BLD AUTO: 8.6 FL (ref 9.2–12.9)
RBC # BLD AUTO: 3.09 M/UL (ref 4–5.4)
SATURATED IRON: 26 % (ref 20–50)
TOTAL IRON BINDING CAPACITY: 337 UG/DL (ref 250–450)
TRANSFERRIN SERPL-MCNC: 228 MG/DL (ref 200–375)
WBC # BLD AUTO: 3.82 K/UL (ref 3.9–12.7)

## 2025-03-07 PROCEDURE — 85025 COMPLETE CBC W/AUTO DIFF WBC: CPT | Performed by: INTERNAL MEDICINE

## 2025-03-07 PROCEDURE — 36415 COLL VENOUS BLD VENIPUNCTURE: CPT | Performed by: INTERNAL MEDICINE

## 2025-03-07 PROCEDURE — 63600175 PHARM REV CODE 636 W HCPCS: Mod: JZ,EC,TB | Performed by: INTERNAL MEDICINE

## 2025-03-07 PROCEDURE — 83540 ASSAY OF IRON: CPT | Performed by: INTERNAL MEDICINE

## 2025-03-07 PROCEDURE — 82728 ASSAY OF FERRITIN: CPT | Performed by: INTERNAL MEDICINE

## 2025-03-07 PROCEDURE — 96372 THER/PROPH/DIAG INJ SC/IM: CPT

## 2025-03-07 RX ADMIN — EPOETIN ALFA-EPBX 40000 UNITS: 40000 INJECTION, SOLUTION INTRAVENOUS; SUBCUTANEOUS at 11:03

## 2025-03-07 NOTE — TELEPHONE ENCOUNTER
Refill Decision Note   Regino Lawrence  is requesting a refill authorization.  Brief Assessment and Rationale for Refill:  Approve     Medication Therapy Plan:         Comments:     Note composed:7:26 PM 03/06/2025             Appointments     Last Visit   10/10/2024 Miceala Mendoza MD   Next Visit   Visit date not found Micaela Mendoza MD       17-Sep-2022 22:08

## 2025-03-07 NOTE — PLAN OF CARE
Pt arrived to unit via wheelchair and accompanied by daughter for injection therapy Retacrit. Pt alert and oriented, reports fatigue and weakness at this time with no new or worsening complaints. Retacrit administered for hgb <11 at 10.6 - pt tolerated with no complaints. Next appointment confirmed and pt discharged home upon completion in [facility].

## 2025-03-07 NOTE — TELEPHONE ENCOUNTER
Refill Decision Note   Regino Lawrence  is requesting a refill authorization.  Brief Assessment and Rationale for Refill:  Quick Discontinue     Medication Therapy Plan: E-Prescribing Status: Receipt confirmed by pharmacy (3/6/2025  7:27 PM CST)      Comments:     Note composed:7:27 PM 03/06/2025

## 2025-03-21 ENCOUNTER — LAB VISIT (OUTPATIENT)
Dept: LAB | Facility: HOSPITAL | Age: 80
End: 2025-03-21
Attending: INTERNAL MEDICINE
Payer: MEDICARE

## 2025-03-21 ENCOUNTER — INFUSION (OUTPATIENT)
Dept: INFUSION THERAPY | Facility: HOSPITAL | Age: 80
End: 2025-03-21
Attending: INTERNAL MEDICINE
Payer: MEDICARE

## 2025-03-21 VITALS
TEMPERATURE: 98 F | SYSTOLIC BLOOD PRESSURE: 116 MMHG | OXYGEN SATURATION: 97 % | DIASTOLIC BLOOD PRESSURE: 54 MMHG | HEART RATE: 93 BPM | RESPIRATION RATE: 16 BRPM

## 2025-03-21 DIAGNOSIS — N18.9 ANEMIA IN CHRONIC KIDNEY DISEASE, UNSPECIFIED CKD STAGE: ICD-10-CM

## 2025-03-21 DIAGNOSIS — D63.1 ANEMIA IN CHRONIC KIDNEY DISEASE, UNSPECIFIED CKD STAGE: ICD-10-CM

## 2025-03-21 DIAGNOSIS — N18.30 ANEMIA IN STAGE 3 CHRONIC KIDNEY DISEASE, UNSPECIFIED WHETHER STAGE 3A OR 3B CKD: Primary | ICD-10-CM

## 2025-03-21 DIAGNOSIS — D63.1 ANEMIA IN STAGE 3 CHRONIC KIDNEY DISEASE, UNSPECIFIED WHETHER STAGE 3A OR 3B CKD: Primary | ICD-10-CM

## 2025-03-21 LAB
BASOPHILS # BLD AUTO: 0.02 K/UL (ref 0–0.2)
BASOPHILS NFR BLD: 0.6 % (ref 0–1.9)
DIFFERENTIAL METHOD BLD: ABNORMAL
EOSINOPHIL # BLD AUTO: 0.1 K/UL (ref 0–0.5)
EOSINOPHIL NFR BLD: 1.6 % (ref 0–8)
ERYTHROCYTE [DISTWIDTH] IN BLOOD BY AUTOMATED COUNT: 13.4 % (ref 11.5–14.5)
FERRITIN SERPL-MCNC: 767 NG/ML (ref 20–300)
HCT VFR BLD AUTO: 29.1 % (ref 37–48.5)
HGB BLD-MCNC: 9.9 G/DL (ref 12–16)
IMM GRANULOCYTES # BLD AUTO: 0.01 K/UL (ref 0–0.04)
IMM GRANULOCYTES NFR BLD AUTO: 0.3 % (ref 0–0.5)
IRON SERPL-MCNC: 64 UG/DL (ref 30–160)
LYMPHOCYTES # BLD AUTO: 0.8 K/UL (ref 1–4.8)
LYMPHOCYTES NFR BLD: 23.6 % (ref 18–48)
MCH RBC QN AUTO: 34 PG (ref 27–31)
MCHC RBC AUTO-ENTMCNC: 34 G/DL (ref 32–36)
MCV RBC AUTO: 100 FL (ref 82–98)
MONOCYTES # BLD AUTO: 0.6 K/UL (ref 0.3–1)
MONOCYTES NFR BLD: 17.1 % (ref 4–15)
NEUTROPHILS # BLD AUTO: 1.8 K/UL (ref 1.8–7.7)
NEUTROPHILS NFR BLD: 56.8 % (ref 38–73)
NRBC BLD-RTO: 0 /100 WBC
PLATELET # BLD AUTO: 284 K/UL (ref 150–450)
PMV BLD AUTO: 8.8 FL (ref 9.2–12.9)
RBC # BLD AUTO: 2.91 M/UL (ref 4–5.4)
SATURATED IRON: 18 % (ref 20–50)
TOTAL IRON BINDING CAPACITY: 355 UG/DL (ref 250–450)
TRANSFERRIN SERPL-MCNC: 240 MG/DL (ref 200–375)
WBC # BLD AUTO: 3.22 K/UL (ref 3.9–12.7)

## 2025-03-21 PROCEDURE — 82728 ASSAY OF FERRITIN: CPT | Performed by: INTERNAL MEDICINE

## 2025-03-21 PROCEDURE — 63600175 PHARM REV CODE 636 W HCPCS: Mod: JZ,EC,TB | Performed by: INTERNAL MEDICINE

## 2025-03-21 PROCEDURE — 85025 COMPLETE CBC W/AUTO DIFF WBC: CPT | Performed by: INTERNAL MEDICINE

## 2025-03-21 PROCEDURE — 36415 COLL VENOUS BLD VENIPUNCTURE: CPT | Performed by: INTERNAL MEDICINE

## 2025-03-21 PROCEDURE — 84466 ASSAY OF TRANSFERRIN: CPT | Performed by: INTERNAL MEDICINE

## 2025-03-21 PROCEDURE — 96372 THER/PROPH/DIAG INJ SC/IM: CPT

## 2025-03-21 RX ADMIN — EPOETIN ALFA-EPBX 40000 UNITS: 40000 INJECTION, SOLUTION INTRAVENOUS; SUBCUTANEOUS at 12:03

## 2025-03-24 ENCOUNTER — NURSE TRIAGE (OUTPATIENT)
Dept: ADMINISTRATIVE | Facility: CLINIC | Age: 80
End: 2025-03-24
Payer: MEDICARE

## 2025-03-24 ENCOUNTER — HOSPITAL ENCOUNTER (OUTPATIENT)
Facility: HOSPITAL | Age: 80
Discharge: HOME | End: 2025-03-25
Attending: EMERGENCY MEDICINE | Admitting: INTERNAL MEDICINE
Payer: MEDICARE

## 2025-03-24 DIAGNOSIS — R07.9 CHEST PAIN: ICD-10-CM

## 2025-03-24 DIAGNOSIS — N18.31 ACUTE RENAL FAILURE SUPERIMPOSED ON STAGE 3A CHRONIC KIDNEY DISEASE, UNSPECIFIED ACUTE RENAL FAILURE TYPE: ICD-10-CM

## 2025-03-24 DIAGNOSIS — R29.818 ACUTE FOCAL NEUROLOGICAL DEFICIT: ICD-10-CM

## 2025-03-24 DIAGNOSIS — E87.1 HYPONATREMIA: Primary | ICD-10-CM

## 2025-03-24 DIAGNOSIS — N17.9 AKI (ACUTE KIDNEY INJURY): ICD-10-CM

## 2025-03-24 DIAGNOSIS — N17.9 ACUTE RENAL FAILURE SUPERIMPOSED ON STAGE 3A CHRONIC KIDNEY DISEASE, UNSPECIFIED ACUTE RENAL FAILURE TYPE: ICD-10-CM

## 2025-03-24 PROBLEM — G40.909 NONINTRACTABLE EPILEPSY WITHOUT STATUS EPILEPTICUS: Status: ACTIVE | Noted: 2025-03-24

## 2025-03-24 PROBLEM — Z86.711 HISTORY OF PULMONARY EMBOLUS (PE): Status: ACTIVE | Noted: 2025-03-24

## 2025-03-24 PROBLEM — Z79.01 LONG TERM (CURRENT) USE OF ANTICOAGULANTS: Status: ACTIVE | Noted: 2022-06-23

## 2025-03-24 PROBLEM — R29.90 EPISODE OF TRANSIENT NEUROLOGIC SYMPTOMS: Status: ACTIVE | Noted: 2025-03-24

## 2025-03-24 PROBLEM — R42 DIZZINESS: Status: ACTIVE | Noted: 2025-03-24

## 2025-03-24 LAB
ABSOLUTE EOSINOPHIL (OHS): 0.14 K/UL
ABSOLUTE MONOCYTE (OHS): 0.66 K/UL (ref 0.3–1)
ABSOLUTE NEUTROPHIL COUNT (OHS): 2.95 K/UL (ref 1.8–7.7)
ALBUMIN SERPL BCP-MCNC: 3.3 G/DL (ref 3.5–5.2)
ALLENS TEST: ABNORMAL
ALP SERPL-CCNC: 62 UNIT/L (ref 40–150)
ALT SERPL W/O P-5'-P-CCNC: 20 UNIT/L (ref 10–44)
ANION GAP (OHS): 11 MMOL/L (ref 8–16)
ANION GAP (OHS): 12 MMOL/L (ref 8–16)
AST SERPL-CCNC: 36 UNIT/L (ref 11–45)
BASOPHILS # BLD AUTO: 0.04 K/UL
BASOPHILS NFR BLD AUTO: 0.8 %
BILIRUB SERPL-MCNC: 0.3 MG/DL (ref 0.1–1)
BILIRUB UR QL STRIP.AUTO: NEGATIVE
BUN SERPL-MCNC: 29 MG/DL (ref 8–23)
BUN SERPL-MCNC: 29 MG/DL (ref 8–23)
BUN SERPL-MCNC: 31 MG/DL (ref 6–30)
CALCIUM SERPL-MCNC: 8.4 MG/DL (ref 8.7–10.5)
CALCIUM SERPL-MCNC: 8.6 MG/DL (ref 8.7–10.5)
CHLORIDE SERPL-SCNC: 86 MMOL/L (ref 95–110)
CHLORIDE SERPL-SCNC: 89 MMOL/L (ref 95–110)
CHLORIDE SERPL-SCNC: 89 MMOL/L (ref 95–110)
CHOLEST SERPL-MCNC: 107 MG/DL (ref 120–199)
CHOLEST/HDLC SERPL: 3.1 {RATIO} (ref 2–5)
CK SERPL-CCNC: 521 U/L (ref 20–180)
CLARITY UR: CLEAR
CO2 SERPL-SCNC: 23 MMOL/L (ref 23–29)
CO2 SERPL-SCNC: 23 MMOL/L (ref 23–29)
COLOR UR AUTO: COLORLESS
CREAT SERPL-MCNC: 1.4 MG/DL (ref 0.5–1.4)
CREAT SERPL-MCNC: 1.6 MG/DL (ref 0.5–1.4)
CREAT SERPL-MCNC: 1.7 MG/DL (ref 0.5–1.4)
CREAT SERPL-MCNC: 1.8 MG/DL (ref 0.5–1.4)
CREAT UR-MCNC: 19 MG/DL (ref 15–325)
ERYTHROCYTE [DISTWIDTH] IN BLOOD BY AUTOMATED COUNT: 13 % (ref 11.5–14.5)
GFR SERPLBLD CREATININE-BSD FMLA CKD-EPI: 33 ML/MIN/1.73/M2
GFR SERPLBLD CREATININE-BSD FMLA CKD-EPI: 38 ML/MIN/1.73/M2
GLUCOSE SERPL-MCNC: 159 MG/DL (ref 70–110)
GLUCOSE SERPL-MCNC: 184 MG/DL (ref 70–110)
GLUCOSE SERPL-MCNC: 193 MG/DL (ref 70–110)
GLUCOSE UR QL STRIP: ABNORMAL
HCO3 UR-SCNC: 27.7 MMOL/L (ref 24–28)
HCT VFR BLD AUTO: 25.2 % (ref 37–48.5)
HCT VFR BLD CALC: 27 %PCV (ref 36–54)
HCT VFR BLD CALC: 29 %PCV (ref 36–54)
HDLC SERPL-MCNC: 35 MG/DL (ref 40–75)
HDLC SERPL: 32.7 % (ref 20–50)
HGB BLD-MCNC: 8.6 GM/DL (ref 12–16)
HGB UR QL STRIP: NEGATIVE
HIV 1+2 AB+HIV1 P24 AG SERPL QL IA: NORMAL
IMM GRANULOCYTES # BLD AUTO: 0.03 K/UL (ref 0–0.04)
IMM GRANULOCYTES NFR BLD AUTO: 0.6 % (ref 0–0.5)
INR PPP: 1.2 (ref 0.8–1.2)
KETONES UR QL STRIP: NEGATIVE
LDLC SERPL CALC-MCNC: 46.6 MG/DL (ref 63–159)
LEUKOCYTE ESTERASE UR QL STRIP: NEGATIVE
LYMPHOCYTES # BLD AUTO: 1.13 K/UL (ref 1–4.8)
MCH RBC QN AUTO: 33.7 PG (ref 27–50)
MCHC RBC AUTO-ENTMCNC: 34.1 G/DL (ref 32–36)
MCV RBC AUTO: 99 FL (ref 82–98)
NITRITE UR QL STRIP: NEGATIVE
NONHDLC SERPL-MCNC: 72 MG/DL
NUCLEATED RBC (/100WBC) (OHS): 0 /100 WBC
OHS QRS DURATION: 86 MS
OHS QTC CALCULATION: 508 MS
OSMOLALITY SERPL: 287 MOSM/KG (ref 275–295)
OSMOLALITY UR: 224 MOSM/KG (ref 50–1200)
PCO2 BLDA: 44.1 MMHG (ref 35–45)
PH SMN: 7.41 [PH] (ref 7.35–7.45)
PH UR STRIP: 8 [PH]
PLATELET # BLD AUTO: 256 K/UL (ref 150–450)
PMV BLD AUTO: 8.9 FL (ref 9.2–12.9)
PO2 BLDA: 32 MMHG (ref 40–60)
POC BE: 3 MMOL/L (ref -2–2)
POC IONIZED CALCIUM: 1.03 MMOL/L (ref 1.06–1.42)
POC IONIZED CALCIUM: 1.15 MMOL/L (ref 1.06–1.42)
POC PTINR: 1.3 (ref 0.9–1.2)
POC PTWBT: 13.4 SEC (ref 9.7–14.3)
POC SATURATED O2: 62 % (ref 95–100)
POC TCO2 (MEASURED): 26 MMOL/L (ref 23–29)
POC TCO2: 29 MMOL/L (ref 24–29)
POCT GLUCOSE: 131 MG/DL (ref 70–110)
POCT GLUCOSE: 134 MG/DL (ref 70–110)
POTASSIUM BLD-SCNC: 3.4 MMOL/L (ref 3.5–5.1)
POTASSIUM BLD-SCNC: 3.9 MMOL/L (ref 3.5–5.1)
POTASSIUM SERPL-SCNC: 3.1 MMOL/L (ref 3.5–5.1)
POTASSIUM SERPL-SCNC: 3.7 MMOL/L (ref 3.5–5.1)
PROT SERPL-MCNC: 6.4 GM/DL (ref 6–8.4)
PROT UR QL STRIP: NEGATIVE
PROTHROMBIN TIME: 12.7 SECONDS (ref 9–12.5)
RBC # BLD AUTO: 2.55 M/UL (ref 4–5.4)
RELATIVE EOSINOPHIL (OHS): 2.8 %
RELATIVE LYMPHOCYTE (OHS): 22.8 % (ref 18–48)
RELATIVE MONOCYTE (OHS): 13.3 % (ref 4–15)
RELATIVE NEUTROPHIL (OHS): 59.7 % (ref 38–73)
SAMPLE: ABNORMAL
SITE: ABNORMAL
SODIUM BLD-SCNC: 122 MMOL/L (ref 136–145)
SODIUM BLD-SCNC: 124 MMOL/L (ref 136–145)
SODIUM SERPL-SCNC: 123 MMOL/L (ref 136–145)
SODIUM SERPL-SCNC: 124 MMOL/L (ref 136–145)
SODIUM UR-SCNC: 58 MMOL/L (ref 20–250)
SP GR UR STRIP: 1.01
TRIGL SERPL-MCNC: 127 MG/DL (ref 30–150)
TROPONIN I SERPL HS-MCNC: <3 NG/L
TSH SERPL-ACNC: 2.76 UIU/ML (ref 0.4–4)
UROBILINOGEN UR STRIP-ACNC: NEGATIVE EU/DL
UUN UR-MCNC: 183 MG/DL (ref 140–1050)
WBC # BLD AUTO: 4.95 K/UL (ref 3.9–12.7)

## 2025-03-24 PROCEDURE — 63600175 PHARM REV CODE 636 W HCPCS

## 2025-03-24 PROCEDURE — 83935 ASSAY OF URINE OSMOLALITY: CPT

## 2025-03-24 PROCEDURE — 87389 HIV-1 AG W/HIV-1&-2 AB AG IA: CPT | Performed by: PHYSICIAN ASSISTANT

## 2025-03-24 PROCEDURE — 80053 COMPREHEN METABOLIC PANEL: CPT

## 2025-03-24 PROCEDURE — 82550 ASSAY OF CK (CPK): CPT

## 2025-03-24 PROCEDURE — 84484 ASSAY OF TROPONIN QUANT: CPT

## 2025-03-24 PROCEDURE — 82962 GLUCOSE BLOOD TEST: CPT | Mod: HCNC

## 2025-03-24 PROCEDURE — 25000003 PHARM REV CODE 250

## 2025-03-24 PROCEDURE — 96360 HYDRATION IV INFUSION INIT: CPT | Mod: HCNC

## 2025-03-24 PROCEDURE — 99900035 HC TECH TIME PER 15 MIN (STAT)

## 2025-03-24 PROCEDURE — 82803 BLOOD GASES ANY COMBINATION: CPT

## 2025-03-24 PROCEDURE — 84300 ASSAY OF URINE SODIUM: CPT

## 2025-03-24 PROCEDURE — 84443 ASSAY THYROID STIM HORMONE: CPT

## 2025-03-24 PROCEDURE — 85610 PROTHROMBIN TIME: CPT

## 2025-03-24 PROCEDURE — G0378 HOSPITAL OBSERVATION PER HR: HCPCS | Mod: HCNC

## 2025-03-24 PROCEDURE — 83930 ASSAY OF BLOOD OSMOLALITY: CPT

## 2025-03-24 PROCEDURE — 93010 ELECTROCARDIOGRAM REPORT: CPT | Mod: ,,, | Performed by: INTERNAL MEDICINE

## 2025-03-24 PROCEDURE — 21400001 HC TELEMETRY ROOM

## 2025-03-24 PROCEDURE — 99285 EMERGENCY DEPT VISIT HI MDM: CPT | Mod: 25,HCNC

## 2025-03-24 PROCEDURE — 81003 URINALYSIS AUTO W/O SCOPE: CPT

## 2025-03-24 PROCEDURE — 96361 HYDRATE IV INFUSION ADD-ON: CPT | Mod: HCNC

## 2025-03-24 PROCEDURE — 82565 ASSAY OF CREATININE: CPT

## 2025-03-24 PROCEDURE — 84540 ASSAY OF URINE/UREA-N: CPT

## 2025-03-24 PROCEDURE — 25000003 PHARM REV CODE 250: Performed by: INTERNAL MEDICINE

## 2025-03-24 PROCEDURE — 25000003 PHARM REV CODE 250: Mod: HCNC | Performed by: INTERNAL MEDICINE

## 2025-03-24 PROCEDURE — 63600175 PHARM REV CODE 636 W HCPCS: Mod: HCNC

## 2025-03-24 PROCEDURE — 82570 ASSAY OF URINE CREATININE: CPT

## 2025-03-24 PROCEDURE — 25000242 PHARM REV CODE 250 ALT 637 W/ HCPCS: Mod: HCNC

## 2025-03-24 PROCEDURE — 25500020 PHARM REV CODE 255: Performed by: EMERGENCY MEDICINE

## 2025-03-24 PROCEDURE — 80061 LIPID PANEL: CPT

## 2025-03-24 PROCEDURE — 85025 COMPLETE CBC W/AUTO DIFF WBC: CPT

## 2025-03-24 PROCEDURE — 25000242 PHARM REV CODE 250 ALT 637 W/ HCPCS

## 2025-03-24 PROCEDURE — 36415 COLL VENOUS BLD VENIPUNCTURE: CPT

## 2025-03-24 PROCEDURE — 93005 ELECTROCARDIOGRAM TRACING: CPT

## 2025-03-24 PROCEDURE — 99223 1ST HOSP IP/OBS HIGH 75: CPT | Mod: HCNC,,, | Performed by: PSYCHIATRY & NEUROLOGY

## 2025-03-24 PROCEDURE — 80047 BASIC METABLC PNL IONIZED CA: CPT | Mod: HCNC

## 2025-03-24 PROCEDURE — 11000001 HC ACUTE MED/SURG PRIVATE ROOM

## 2025-03-24 RX ORDER — PANTOPRAZOLE SODIUM 40 MG/1
40 TABLET, DELAYED RELEASE ORAL DAILY
Status: DISCONTINUED | OUTPATIENT
Start: 2025-03-24 | End: 2025-03-25 | Stop reason: HOSPADM

## 2025-03-24 RX ORDER — FOLIC ACID 1 MG/1
1000 TABLET ORAL DAILY
Status: DISCONTINUED | OUTPATIENT
Start: 2025-03-24 | End: 2025-03-25 | Stop reason: HOSPADM

## 2025-03-24 RX ORDER — ACETAMINOPHEN 325 MG/1
650 TABLET ORAL EVERY 8 HOURS PRN
Status: DISCONTINUED | OUTPATIENT
Start: 2025-03-24 | End: 2025-03-25 | Stop reason: HOSPADM

## 2025-03-24 RX ORDER — IPRATROPIUM BROMIDE AND ALBUTEROL SULFATE 2.5; .5 MG/3ML; MG/3ML
3 SOLUTION RESPIRATORY (INHALATION) EVERY 6 HOURS PRN
Status: DISCONTINUED | OUTPATIENT
Start: 2025-03-24 | End: 2025-03-25 | Stop reason: HOSPADM

## 2025-03-24 RX ORDER — FENOFIBRATE 160 MG/1
160 TABLET ORAL DAILY
Status: DISCONTINUED | OUTPATIENT
Start: 2025-03-24 | End: 2025-03-25 | Stop reason: HOSPADM

## 2025-03-24 RX ORDER — TALC
6 POWDER (GRAM) TOPICAL NIGHTLY PRN
Status: DISCONTINUED | OUTPATIENT
Start: 2025-03-24 | End: 2025-03-25 | Stop reason: HOSPADM

## 2025-03-24 RX ORDER — ALUMINUM HYDROXIDE, MAGNESIUM HYDROXIDE, AND SIMETHICONE 1200; 120; 1200 MG/30ML; MG/30ML; MG/30ML
30 SUSPENSION ORAL 4 TIMES DAILY PRN
Status: DISCONTINUED | OUTPATIENT
Start: 2025-03-24 | End: 2025-03-25 | Stop reason: HOSPADM

## 2025-03-24 RX ORDER — ACETAMINOPHEN 325 MG/1
650 TABLET ORAL EVERY 4 HOURS PRN
Status: DISCONTINUED | OUTPATIENT
Start: 2025-03-24 | End: 2025-03-25 | Stop reason: HOSPADM

## 2025-03-24 RX ORDER — NALOXONE HCL 0.4 MG/ML
0.02 VIAL (ML) INJECTION
Status: DISCONTINUED | OUTPATIENT
Start: 2025-03-24 | End: 2025-03-25 | Stop reason: HOSPADM

## 2025-03-24 RX ORDER — POLYETHYLENE GLYCOL 3350 17 G/17G
17 POWDER, FOR SOLUTION ORAL DAILY
Status: DISCONTINUED | OUTPATIENT
Start: 2025-03-25 | End: 2025-03-25 | Stop reason: HOSPADM

## 2025-03-24 RX ORDER — MEMANTINE HYDROCHLORIDE 5 MG/1
5 TABLET ORAL 2 TIMES DAILY
Status: DISCONTINUED | OUTPATIENT
Start: 2025-03-24 | End: 2025-03-25 | Stop reason: HOSPADM

## 2025-03-24 RX ORDER — LEVOTHYROXINE SODIUM 50 UG/1
50 TABLET ORAL
Status: DISCONTINUED | OUTPATIENT
Start: 2025-03-25 | End: 2025-03-24

## 2025-03-24 RX ORDER — PREGABALIN 75 MG/1
75 CAPSULE ORAL 2 TIMES DAILY
Status: DISCONTINUED | OUTPATIENT
Start: 2025-03-24 | End: 2025-03-25 | Stop reason: HOSPADM

## 2025-03-24 RX ORDER — INSULIN ASPART 100 [IU]/ML
0-5 INJECTION, SOLUTION INTRAVENOUS; SUBCUTANEOUS
Status: DISCONTINUED | OUTPATIENT
Start: 2025-03-24 | End: 2025-03-25 | Stop reason: HOSPADM

## 2025-03-24 RX ORDER — SODIUM CHLORIDE 0.9 % (FLUSH) 0.9 %
10 SYRINGE (ML) INJECTION EVERY 12 HOURS PRN
Status: DISCONTINUED | OUTPATIENT
Start: 2025-03-24 | End: 2025-03-25 | Stop reason: HOSPADM

## 2025-03-24 RX ORDER — ONDANSETRON 8 MG/1
8 TABLET, ORALLY DISINTEGRATING ORAL EVERY 8 HOURS PRN
Status: DISCONTINUED | OUTPATIENT
Start: 2025-03-24 | End: 2025-03-25 | Stop reason: HOSPADM

## 2025-03-24 RX ORDER — ATORVASTATIN CALCIUM 20 MG/1
20 TABLET, FILM COATED ORAL DAILY
Status: DISCONTINUED | OUTPATIENT
Start: 2025-03-24 | End: 2025-03-25 | Stop reason: HOSPADM

## 2025-03-24 RX ORDER — LEVETIRACETAM 500 MG/1
1000 TABLET ORAL 2 TIMES DAILY
Status: DISCONTINUED | OUTPATIENT
Start: 2025-03-24 | End: 2025-03-25 | Stop reason: HOSPADM

## 2025-03-24 RX ORDER — IBUPROFEN 200 MG
16 TABLET ORAL
Status: DISCONTINUED | OUTPATIENT
Start: 2025-03-24 | End: 2025-03-25 | Stop reason: HOSPADM

## 2025-03-24 RX ORDER — POTASSIUM CHLORIDE 750 MG/1
30 CAPSULE, EXTENDED RELEASE ORAL EVERY 4 HOURS
Status: COMPLETED | OUTPATIENT
Start: 2025-03-24 | End: 2025-03-24

## 2025-03-24 RX ORDER — PREDNISONE 1 MG/1
3 TABLET ORAL DAILY
Status: DISCONTINUED | OUTPATIENT
Start: 2025-03-24 | End: 2025-03-25 | Stop reason: HOSPADM

## 2025-03-24 RX ORDER — LEVOTHYROXINE SODIUM 50 UG/1
50 TABLET ORAL
Status: DISCONTINUED | OUTPATIENT
Start: 2025-03-24 | End: 2025-03-25 | Stop reason: HOSPADM

## 2025-03-24 RX ORDER — IBUPROFEN 200 MG
24 TABLET ORAL
Status: DISCONTINUED | OUTPATIENT
Start: 2025-03-24 | End: 2025-03-25 | Stop reason: HOSPADM

## 2025-03-24 RX ORDER — GLUCAGON 1 MG
1 KIT INJECTION
Status: DISCONTINUED | OUTPATIENT
Start: 2025-03-24 | End: 2025-03-25 | Stop reason: HOSPADM

## 2025-03-24 RX ORDER — CETIRIZINE HYDROCHLORIDE 10 MG/1
10 TABLET ORAL DAILY
Status: DISCONTINUED | OUTPATIENT
Start: 2025-03-24 | End: 2025-03-25 | Stop reason: HOSPADM

## 2025-03-24 RX ADMIN — SODIUM CHLORIDE 1000 ML: 9 INJECTION, SOLUTION INTRAVENOUS at 04:03

## 2025-03-24 RX ADMIN — PREGABALIN 75 MG: 75 CAPSULE ORAL at 09:03

## 2025-03-24 RX ADMIN — PANTOPRAZOLE SODIUM 40 MG: 40 TABLET, DELAYED RELEASE ORAL at 11:03

## 2025-03-24 RX ADMIN — POTASSIUM CHLORIDE 30 MEQ: 750 CAPSULE, EXTENDED RELEASE ORAL at 11:03

## 2025-03-24 RX ADMIN — POTASSIUM CHLORIDE 30 MEQ: 750 CAPSULE, EXTENDED RELEASE ORAL at 02:03

## 2025-03-24 RX ADMIN — LEVETIRACETAM 1000 MG: 500 TABLET, FILM COATED ORAL at 09:03

## 2025-03-24 RX ADMIN — PREGABALIN 75 MG: 75 CAPSULE ORAL at 11:03

## 2025-03-24 RX ADMIN — ACETAMINOPHEN 650 MG: 325 TABLET ORAL at 09:03

## 2025-03-24 RX ADMIN — IOHEXOL 75 ML: 350 INJECTION, SOLUTION INTRAVENOUS at 01:03

## 2025-03-24 RX ADMIN — PREDNISONE 2 MG: 1 TABLET ORAL at 11:03

## 2025-03-24 RX ADMIN — FENOFIBRATE 160 MG: 160 TABLET ORAL at 12:03

## 2025-03-24 RX ADMIN — APIXABAN 5 MG: 5 TABLET, FILM COATED ORAL at 09:03

## 2025-03-24 RX ADMIN — LEVETIRACETAM 1000 MG: 500 TABLET, FILM COATED ORAL at 11:03

## 2025-03-24 RX ADMIN — LEVOTHYROXINE SODIUM 50 MCG: 0.05 TABLET ORAL at 12:03

## 2025-03-24 RX ADMIN — APIXABAN 5 MG: 5 TABLET, FILM COATED ORAL at 11:03

## 2025-03-24 RX ADMIN — MEMANTINE HYDROCHLORIDE 5 MG: 5 TABLET ORAL at 11:03

## 2025-03-24 RX ADMIN — ATORVASTATIN CALCIUM 20 MG: 20 TABLET, FILM COATED ORAL at 11:03

## 2025-03-24 RX ADMIN — MEMANTINE HYDROCHLORIDE 5 MG: 5 TABLET ORAL at 09:03

## 2025-03-24 RX ADMIN — FOLIC ACID 1000 MCG: 1 TABLET ORAL at 11:03

## 2025-03-24 NOTE — ASSESSMENT & PLAN NOTE
- normal to hypotensive on admission  - hold home coreg and nifedipine for now  - restart as indicated  - vitals 4qh

## 2025-03-24 NOTE — ASSESSMENT & PLAN NOTE
Antalgic gait  Patient with Chronic debility due to age-related physical debility. The patient's latest AMPAC (Activity Measure for Post Acute Care) Score is listed below.    AM-PAC Score - How much help does the patient need for each activity listed     Plan  - Progressive mobility protocol initated  - Fall precautions in place  - pressure injury prevention orders in place

## 2025-03-24 NOTE — Clinical Note
Diagnosis: Acute focal neurological deficit [952900]   Reason for IP Medical Treatment  (Clinical interventions that can only be accomplished in the IP setting? ) :: hyponatremia, george   Plans for Post-Acute care--if anticipated (pick the single best option):: A. No post acute care anticipated at this time

## 2025-03-24 NOTE — PLAN OF CARE
[facility] Emergency Dept  Initial Discharge Assessment       Primary Care Provider: Micaela Mnedoza MD    Admission Diagnosis: Acute focal neurological deficit [R29.818]    Admission Date: 3/24/2025  Expected Discharge Date:     Pt daughter Maynor at bedside.  Pt stated she uses a scooter to assist with ambulation and is independent with her ADL's and does not require assistance     Post acute services discussed and declined at this time    Pt to d/c home with no needs.     Transition of Care Barriers: (P) None    Payor: [employer] MANAGED MEDICARE / Plan: [employer] MEDICARE HMO / Product Type: Capitation /     Extended Emergency Contact Information  Primary Emergency Contact: Maynor Lawrence  Address: [address] [place] [address] [facility]  Home Phone: [phone]  Work Phone: [phone]  Mobile Phone: [phone]  Relation: Daughter   needed? No    Discharge Plan A: (P) Home, Home with family  Discharge Plan B: (P) Home      [facility] - [place], [place] - [address] [facility] [address]  [place] [address]  Phone: [phone] Fax: [phone]    [facility] - [facility] [address] [facility]  [address] [facility] [address]  Phone: [phone] Fax: [phone]    [facility] [id number] [place] [address] EXP AT [address] [address]  Phone: [phone] Fax: [phone]      Initial Assessment (most recent)       Adult Discharge Assessment - 03/24/25 0736          Discharge Assessment    Assessment Type Discharge Planning Assessment (P)      Confirmed/corrected address, phone number and insurance Yes (P)      Confirmed Demographics Correct on Facesheet (P)      Source of Information patient (P)      People in Home child(donaldo), adult (P)      Facility Arrived From: home (P)      Do you expect to return to your current living situation? Yes (P)      Do you have help at home or  someone to help you manage your care at home? No (P)      Prior to hospitilization cognitive status: Alert/Oriented;No Deficits (P)      Current cognitive status: No Deficits;Alert/Oriented (P)      Walking or Climbing Stairs Difficulty yes (P)      Walking or Climbing Stairs ambulation difficulty, requires equipment (P)      Mobility Management scooter (P)      Dressing/Bathing Difficulty no (P)      Home Accessibility wheelchair accessible (P)      Home Layout Able to live on 1st floor (P)      Equipment Currently Used at Home shower chair;wheelchair;walker, rolling;cane, straight;other (see comments) (P)    electric scooter    Patient currently being followed by outpatient case management? No (P)      Do you currently have service(s) that help you manage your care at home? No (P)      Do you have any problems affording any of your prescribed medications? No (P)      Is the patient taking medications as prescribed? yes (P)      Who is going to help you get home at discharge? family/friends (P)      How do you get to doctors appointments? family or friend will provide (P)      Are you on dialysis? No (P)      Do you take coumadin? No (P)      Discharge Plan A Home;Home with family (P)      Discharge Plan B Home (P)      DME Needed Upon Discharge  none (P)      Discharge Plan discussed with: Patient (P)      Transition of Care Barriers None (P)         Physical Activity    On average, how many days per week do you engage in moderate to strenuous exercise (like a brisk walk)? 0 days (P)      On average, how many minutes do you engage in exercise at this level? 0 min (P)         Financial Resource Strain    How hard is it for you to pay for the very basics like food, housing, medical care, and heating? Not very hard (P)         Housing Stability    In the last 12 months, was there a time when you were not able to pay the mortgage or rent on time? No (P)      At any time in the past 12 months, were you homeless or  living in a shelter (including now)? No (P)         Transportation Needs    In the past 12 months, has lack of transportation kept you from medical appointments or from getting medications? No (P)      In the past 12 months, has lack of transportation kept you from meetings, work, or from getting things needed for daily living? No (P)         Food Insecurity    Within the past 12 months, you worried that your food would run out before you got the money to buy more. Never true (P)      Within the past 12 months, the food you bought just didn't last and you didn't have money to get more. Never true (P)         Stress    Do you feel stress - tense, restless, nervous, or anxious, or unable to sleep at night because your mind is troubled all the time - these days? Only a little (P)         Social Isolation    How often do you feel lonely or isolated from those around you?  Rarely (P)         Alcohol Use    Q1: How often do you have a drink containing alcohol? Never (P)      Q2: How many drinks containing alcohol do you have on a typical day when you are drinking? Patient does not drink (P)      Q3: How often do you have six or more drinks on one occasion? Never (P)         Utilities    In the past 12 months has the electric, gas, oil, or water company threatened to shut off services in your home? No (P)         Health Literacy    How often do you need to have someone help you when you read instructions, pamphlets, or other written material from your doctor or pharmacy? Rarely (P)         OTHER    Name(s) of People in Home adult daughter Maynor (P)                    Discharge Plan A and Plan B have been determined by review of patient's clinical status, future medical and therapeutic needs, and coverage/benefits for post-acute care in coordination with multidisciplinary team members.    Doris Fischer CD, MSW, LMSW, RSW   Case Management  [facility]  Email: [email or web address]

## 2025-03-24 NOTE — SUBJECTIVE & OBJECTIVE
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, without long-term current use of insulin     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, without long-term current use of insulin     Depression     Diabetes mellitus, type 2     Eye injury as a child     k-abrasion  od    Hyperlipidemia     Hypertension     Hypothyroidism     Immune deficiency disorder     Immune disorder     LOC (loss of consciousness) 2021    at home    Myalgia and myositis 2012    Osteoporosis     Polyneuropathy     Pulmonary embolism 07/10/2021    Renal manifestation of secondary diabetes mellitus     Sarcoidosis     Seizure     Type 2 diabetes mellitus     Ulcer     no cancer    Urinary incontinence        Past Surgical History:   Procedure Laterality Date    CARPAL TUNNEL RELEASE      Rt wrist    CATARACT EXTRACTION W/  INTRAOCULAR LENS IMPLANT Right 2015    Dr. Azevedo    CATARACT EXTRACTION W/  INTRAOCULAR LENS IMPLANT Left 2015    Dr. Azevedo    CERVICAL SPINE SURGERY       SECTION      CHOLECYSTECTOMY      INJECTION OF ANESTHETIC AGENT AROUND NERVE Left 2020    Procedure: BLOCK, NERVE LEFT FEMORAL AND OBTURATOR;  Surgeon: Alfonso Richards MD;  Location: [facility];  Service: Pain Management;  Laterality: Left;  NEEDS CONSENT    INJECTION OF ANESTHETIC AGENT AROUND NERVE Bilateral 10/09/2023    Procedure: BLOCK, NERVE, BILATERAL L3-L4-L5 MEDIAL BRANCH;  Surgeon: Alfonso Richards MD;  Location: [facility];  Service: Pain Management;  Laterality: Bilateral;    INJECTION OF JOINT Left 2019    Procedure: Injection, Joint  fLUOROSCOPIC jOINT iNJECTION (hIP iNJECTION) LEFT ROCH BURSA AS  WELL LEFT TROCHANTERIC BURSA;  Surgeon: Alfonso Richards MD;  Location: [facility];  Service: Pain Management;  Laterality: Left;  NEEDS CONSENT, DIABETIC    INJECTION OF JOINT Left 07/22/2019    Procedure: Injection, Joint FLUOROSCOPIC JOINT INJECTION (HIP INJECTION) LEFT HIP;  Surgeon: Alfonso Richards MD;  Location: [facility];  Service: Pain Management;  Laterality: Left;  NEEDS CONSENT    INJECTION OF JOINT Left 09/12/2019    Procedure: INJECTION, JOINT;  Surgeon: Alfonso Richards MD;  Location: [facility];  Service: Pain Management;  Laterality: Left;  Left Hip and Left GTB Injections    INJECTION OF JOINT Left 07/27/2020    Procedure: INJECTION, JOINT, LEFT HIP and LEFT GREATER TROCHANTERIC BURSA;  Surgeon: Alfonso Richards MD;  Location: [facility];  Service: Pain Management;  Laterality: Left;  INJECTION, JOINT, LEFT HIP and LEFT GREATER TROCHANTERIC BURSA    INJECTION OF JOINT Left 09/03/2020    Procedure: INJECTION, JOINT, LEFT SI;  Surgeon: Alfonso Richards MD;  Location: [facility];  Service: Pain Management;  Laterality: Left;  INJECTION, JOINT, LEFT SI    TRANSFORAMINAL EPIDURAL INJECTION OF STEROID Bilateral 12/05/2019    Procedure: INJECTION, STEROID, EPIDURAL, TRANSFORAMINAL APPROACH;  Surgeon: Alfonso Richards MD;  Location: [facility];  Service: Pain Management;  Laterality: Bilateral;  B/L TF RHIANNA L5  Consent Needed    TRANSFORAMINAL EPIDURAL INJECTION OF STEROID Bilateral 06/29/2020    Procedure: INJECTION, STEROID, EPIDURAL, TRANSFORAMINAL APPROACH L5/S1;  Surgeon: Alfonso Richards MD;  Location: [facility];  Service: Pain Management;  Laterality: Bilateral;  B/L TF RHIANNA L5/S1    TUBAL LIGATION         Review of patient's allergies indicates:   Allergen Reactions    Azathioprine Shortness Of Breath and Other (See Comments)     Fatigue       Current Facility-Administered Medications on File Prior to Encounter   Medication    denosumab (PROLIA) injection 60 mg     Current Outpatient Medications on File  Prior to Encounter   Medication Sig    (Magic mouthwash) 1:1:1 diphenhydrAMINE(Benadryl) 12.5mg/5ml liq, aluminum & magnesium hydroxide-simethicone (Maalox), LIDOcaine viscous 2% Swish and spit 5 mLs every 4 (four) hours as needed (mouth pain). for mouth sores    ACCU-CHEK FASTCLIX LANCING DEV Kit USE AS DIRECTED.    albuterol-ipratropium (DUO-NEB) 2.5 mg-0.5 mg/3 mL nebulizer solution Take 3 mLs by nebulization every 4 (four) hours as needed for Wheezing or Shortness of Breath. Rescue    alpha lipoic acid 600 mg Cap Take 600 mg by mouth once daily.    apixaban (ELIQUIS) 5 mg Tab Take 1 tablet (5 mg total) by mouth 2 (two) times daily. Start this prescription after finishing starter pack    atorvastatin (LIPITOR) 20 MG tablet Take 1 tablet (20 mg total) by mouth once daily.    blood sugar diagnostic (ACCU-CHEK SMARTVIEW TEST STRIP) Strp Use as directed to check blood sugar twice daily.    blood sugar diagnostic Strp To check BG three times daily, to use with insurance preferred meter    blood-glucose meter kit Use as instructed    carvediloL (COREG) 25 MG tablet Take 1 tablet (25 mg total) by mouth 2 (two) times daily.    cetirizine (ZYRTEC) 10 MG tablet Take 1 tablet (10 mg total) by mouth once daily.    chlorthalidone (HYGROTEN) 25 MG Tab Take 1 tablet (25 mg total) by mouth once daily.    clotrimazole (LOTRIMIN) 1 % cream Apply topically 2 (two) times daily.    diclofenac sodium (VOLTAREN) 1 % Gel Apply 2 g topically once daily.    DULoxetine (CYMBALTA) 60 MG capsule Take 1 capsule (60 mg total) by mouth once daily.    fenofibrate 160 MG Tab Take 1 tablet by mouth once daily    fluticasone propion-salmeterol 115-21 mcg/dose (ADVAIR HFA) 115-21 mcg/actuation HFAA inhaler Inhale 2 puffs into the lungs every 12 (twelve) hours. Controller    folic acid (FOLVITE) 1 MG tablet Take 1 tablet (1,000 mcg total) by mouth once daily.    insulin glargine U-100, Lantus, (LANTUS SOLOSTAR U-100 INSULIN) 100 unit/mL (3 mL) InPn  "pen Inject 30 Units into the skin 2 (two) times a day.    levETIRAcetam (KEPPRA) 750 MG Tab Take 1 tablet (750 mg total) by mouth 2 (two) times daily.    levothyroxine (SYNTHROID) 50 MCG tablet Take 1 tablet (50 mcg total) by mouth before breakfast.    linaCLOtide (LINZESS) 72 mcg Cap capsule Take 1 capsule (72 mcg total) by mouth before breakfast.    memantine (NAMENDA) 5 MG Tab Take 1 tablet (5 mg total) by mouth 2 (two) times daily.    NIFEdipine (PROCARDIA-XL) 60 MG (OSM) 24 hr tablet Take 1 tablet (60 mg total) by mouth 2 (two) times a day.    pantoprazole (PROTONIX) 40 MG tablet Take 1 tablet (40 mg total) by mouth once daily.    pen needle, diabetic (BD ULTRA-FINE ANA PEN NEEDLE) 32 gauge x 5/32" Ndle For use with insulin pen    predniSONE (DELTASONE) 1 MG tablet TAKE 4 TABLETS BY MOUTH ONCE DAILY FOR  ONE  MONTH  THEN  DECREASE  BY  1  TABLET  EVERY  MONTH  IF  LABS  ALLOW    pregabalin (LYRICA) 75 MG capsule Take 1 capsule (75 mg total) by mouth 2 (two) times daily.    promethazine-dextromethorphan (PROMETHAZINE-DM) 6.25-15 mg/5 mL Syrp Take one tsp po q 6 hrs prn cough    tiZANidine (ZANAFLEX) 2 MG tablet Take 2 tablets (4 mg total) by mouth every 8 (eight) hours as needed (muscle spasm).    triazolam (HALCION) 0.25 MG Tab     [DISCONTINUED] gabapentin (NEURONTIN) 400 MG capsule Take 1 capsule (400 mg total) by mouth 3 (three) times daily     Family History       Problem Relation (Age of Onset)    Arthritis Sister    Cancer Sister    Cataracts Mother    Diabetes Son    Glaucoma Sister    Hepatitis Sister    Hypertension Mother, Maternal Grandmother, Son    Immunodeficiency Sister    Kidney failure Sister    No Known Problems Father, Brother, Maternal Aunt, Maternal Uncle, Paternal Aunt, Paternal Uncle, Maternal Grandfather, Paternal Grandmother, Paternal Grandfather          Tobacco Use    Smoking status: Never     Passive exposure: Never    Smokeless tobacco: Never   Substance and Sexual Activity    " Alcohol use: No    Drug use: No    Sexual activity: Not Currently     Partners: Male     Review of Systems   Constitutional:  Positive for appetite change (decreased). Negative for activity change, chills and fever.   HENT:  Negative for trouble swallowing.    Eyes:  Negative for photophobia and visual disturbance.   Respiratory:  Negative for cough, chest tightness and shortness of breath.    Cardiovascular:  Negative for chest pain, palpitations and leg swelling.   Gastrointestinal:  Negative for abdominal pain, constipation, diarrhea, nausea and vomiting.   Genitourinary:  Negative for dysuria, frequency and hematuria.   Musculoskeletal:  Positive for arthralgias (2/2 chronic pain) and gait problem (uses walker/power scooter at baseline). Negative for back pain and neck pain.   Skin:  Negative for rash and wound.   Neurological:  Positive for dizziness (resolved), facial asymmetry (resolved), speech difficulty (resolved) and weakness (resolved). Negative for syncope and light-headedness.   Psychiatric/Behavioral:  Negative for agitation and confusion. The patient is not nervous/anxious.      Objective:     Vital Signs (Most Recent):  Temp: 97.9 °F (36.6 °C) (03/24/25 0120)  Pulse: 81 (03/24/25 0917)  Resp: 15 (03/24/25 0917)  BP: 108/61 (03/24/25 0917)  SpO2: 99 % (03/24/25 0917) Vital Signs (24h Range):  Temp:  [97.9 °F (36.6 °C)] 97.9 °F (36.6 °C)  Pulse:  [76-87] 81  Resp:  [10-20] 15  SpO2:  [95 %-100 %] 99 %  BP: (100-119)/(55-70) 108/61     Weight: 69.4 kg (153 lb)  Body mass index is 27.98 kg/m².     Physical Exam  Vitals and nursing note reviewed.   Constitutional:       General: She is not in acute distress.     Appearance: She is well-developed. She is ill-appearing (chronically). She is not toxic-appearing.   HENT:      Head: Normocephalic and atraumatic.   Eyes:      Extraocular Movements: Extraocular movements intact.   Cardiovascular:      Rate and Rhythm: Normal rate and regular rhythm.      Heart  sounds: Normal heart sounds.   Pulmonary:      Effort: Pulmonary effort is normal. No respiratory distress.   Abdominal:      General: There is no distension.      Tenderness: There is no abdominal tenderness.   Musculoskeletal:         General: No tenderness. Normal range of motion.      Right lower le+ Pitting Edema present.      Left lower le+ Pitting Edema present.   Skin:     General: Skin is warm and dry.      Findings: No rash.   Neurological:      General: No focal deficit present.      Mental Status: She is alert and oriented to person, place, and time.      Cranial Nerves: No cranial nerve deficit, dysarthria or facial asymmetry.      Motor: Weakness (2/5 strength to BLE, baseline. Weak left hand , chronic) present.   Psychiatric:         Behavior: Behavior normal.         Thought Content: Thought content normal.         Judgment: Judgment normal.        Significant Labs: All pertinent labs within the past 24 hours have been reviewed.  CBC:   Recent Labs   Lab 25   WBC  --   --  4.95   HGB  --   --  8.6*   HCT 29* 27* 25.2*   PLT  --   --  256     CMP:   Recent Labs   Lab 25   * 123*   K 3.7 3.1*   CL 89* 89*   CO2 23 23   BUN 29* 29*   CREATININE 1.6* 1.4   CALCIUM 8.4* 8.6*   ALBUMIN 3.3*  --    BILITOT 0.3  --    ALKPHOS 62  --    AST 36  --    ALT 20  --    ANIONGAP 12 11     Coagulation:   Recent Labs   Lab 25   INR 1.2     Troponin:   Recent Labs   Lab 25   TROPONINIHS <3     TSH:   Recent Labs   Lab 25   TSH 2.762     Significant Imaging: I have reviewed all pertinent imaging results/findings within the past 24 hours.  Imaging Results              MRI Brain Without Contrast (Final result)  Result time 25 05:00:22      Final result by Yayo Luis MD (25 05:00:22)                   Impression:      No evidence of acute intracranial hemorrhage or major vascular  distribution infarct.    Pattern of generalized cerebral volume loss and chronic small vessel ischemic changes.    Electronically signed by resident: Haris Gonzalez  Date:    03/24/2025  Time:    04:09    Electronically signed by: Yayo Luis  Date:    03/24/2025  Time:    05:00               Narrative:    EXAMINATION:  MRI BRAIN WITHOUT CONTRAST    CLINICAL HISTORY:  aphasia;    TECHNIQUE:  Multiplanar multisequence MR imaging of the brain was performed without intravenous contrast.    COMPARISON:  CTA stroke 03/24/2025, MRI brain 11/04/2024    FINDINGS:  Numerous sequences are significantly degraded by patient motion.    Intracranial Compartment:    Ventricles are prominent, unchanged in size with mild widening of cerebral sulci compatible with generalized volume loss.  No hydrocephalus.    Moderate patchy and confluent T2/FLAIR hyperintensity in the supratentorial white matter, nonspecific but most likely reflecting chronic small vessel ischemic changes.  Remote lacunar type infarcts in the bilateral basal ganglia.  No diffusion restriction to suggest acute infarct.  No mass, hemorrhage, mass effect, or midline shift.  Susceptibility artifact in the bilateral cerebellar hemispheres and right thalamus suggesting remote microhemorrhages.    No extra-axial blood or fluid collections.    Normal vascular flow voids are preserved.    Skull/Extracranial Contents (limited evaluation):    Bone marrow signal intensity is normal.    Pre-existing C2-C3 anterolisthesis with associated spinal stenosis.    The known C3 through C7 laminectomies and posterior instrumented fusion are partially visualized                                        CTA STROKE MULTI-PHASE (XPD) (Final result)  Result time 03/24/25 04:11:29      Final result by Yayo Luis MD (03/24/25 04:11:29)                   Impression:      No intracranial hemorrhage or major vascular distribution infarct.  Consider MRI brain if further evaluation is  required.    No high-grade vascular stenosis or occlusion in the craniocervical cerebrovascular arterial circulation.    Suspected 3.5 x 2.6 x 3.9 mm aneurysm of the right A1 CHLOE or anterior communicating artery.  Correlate clinically and with appropriate follow-up.    Small air-fluid level within the upper thoracic esophagus could be related to gastroesophageal reflux, alteration of esophageal motility, or perhaps a distal esophageal stricture (whether benign or malignant).  Recommendations include clinical correlation and follow-up outpatient EGD.    Note that this finding could potentially predispose to aspiration.    Chronic C2-C3 anterolisthesis, demonstrated previously, with consequent spinal stenosis.  Underlying spinal cord poorly visualized, limiting assessment for any associated spinal cord compression.  Correlate clinically.    Additional observations as detailed in the body of the report.    This report was flagged in Epic as abnormal.    Electronically signed by resident: Haris Gonzalez  Date:    03/24/2025  Time:    02:20    Electronically signed by: Yayo Luis  Date:    03/24/2025  Time:    04:11               Narrative:    EXAMINATION:  CTA STROKE MULTI-PHASE (XPD)    CLINICAL HISTORY:  Neuro deficit, acute, stroke suspected;    TECHNIQUE:  Axial CT images obtained throughout the region of the head before the administration of intravenous contrast.    CT angiogram was performed through the cervical and intracranial vasculature during the IV bolus administration of 75mL of Omnipaque 350.  Two additional phases through the intracranial vasculature via multiphase technique.    Multiplanar MPR and MIP reformats were performed.    CT source data was analyzed using artificial intelligence software for detection of a large vessel occlusions (LVO) in order to enable computer assisted triage notification and aid clinical stroke decision making.    COMPARISON:  MRA brain 02/26/2025    MRI brain  11/04/2025    CT head 03/11/2024    Cervical spine CT 03/11/2024    FINDINGS:  Unchanged prominence of the ventricles without evidence of hydrocephalus.    Moderate patchy hypoattenuation in the supratentorial white matter, nonspecific but most likely reflecting chronic microvascular ischemic changes.    Remote lacunar type infarct of the right basal ganglia.    No major vascular distribution brain infarct.    No acute intracranial hemorrhage.    No intracranial mass effect or midline shift.    No extra-axial blood or fluid collections.    The cranium appears intact. Mastoid air cells and paranasal sinuses are essentially clear.      CTA:    The aortic arch demonstratesmild atherosclerosis with no significant stenosis at the major branch vessel origins.    The right common carotid artery is normal in caliber.  No significant stenosis at the carotid bifurcation.The right internal carotid artery is normal in caliber and mild atherosclerosis in the cavernous segment..    The left common carotid artery is normal in caliber. No significant stenosis at the carotid bifurcation.The left internal carotid artery demonstrates moderate atherosclerosis in the cavernous segment with mild luminal narrowing.  The left internal carotid artery is otherwise normal in caliber.    The right vertebral artery is normal in caliber.    The left vertebral artery is normal in caliber.    Basilar artery is within normal limits without focal abnormality.    The proximal anterior cerebral arteries appear patent.    A slightly irregularly shaped, approximately 3.5 x 2.6 x 3.9 mm outpouching of the right A1 CHLOE or anterior communicating artery is suspicious for an intracranial aneurysm (series 5, image 359).    Proximal middle and posterior cerebral arteries are within normal limits.  No evidence of significant stenosis or focal occlusion.    Additional CT findings: Intraluminal gas and fluid within the partially distended upper thoracic  esophagus.    Visualized proximal airways are patent    Mosaic attenuation of the bilateral lung apices, possibly reflecting small airways disease, mosaic perfusion, or edema.  No consolidation, pneumothorax, or pleural fluid in the visualized upper chest..    No acute fracture deformity in the visualized skeleton.    Pre-existing grade 1/grade 2 C2-C3 anterolisthesis (causing some spinal stenosis; the underlying spinal cord is poorly visualized on these CT images, limiting definitive assessment for any associated spinal cord compression), and C3-C7 laminectomies with bilateral posterior instrumented cervical fusion, all similar to comparison images.

## 2025-03-24 NOTE — ED NOTES
Assumed care of patient Regino Lawrence, a 79 y.o. female     Triage note:  Chief Complaint   Patient presents with    Cerebrovascular Accident     Arrives via EMS w/ c/o slurred speech, r-sided drooping, and visual field deficit. LKN [id number] today.

## 2025-03-24 NOTE — TELEPHONE ENCOUNTER
Caller states pt is currently experiencing stroke symptoms. Caller states pt has slurred speech at this time.  Pt advised per protocol and verbalized understanding.   Reason for Disposition   Stroke suspected (e.g., sudden onset of weakness of the face, arm or leg on one side of the body)    Protocols used: 911 Symptoms-A-AH

## 2025-03-24 NOTE — H&P
[facility] - Emergency Dept  Hospital Medicine  History & Physical    Patient Name: Regino Lawrence  MRN: [id number]  Patient Class: IP- Inpatient  Admission Date: 3/24/2025  Attending Physician: Juan Luis Loyola MD   Primary Care Provider: Micaela Mendoza MD    Patient information was obtained from patient, relative(s), past medical records, and ER records.     Subjective:     Principal Problem:Acute renal failure superimposed on stage 3a chronic kidney disease    Chief Complaint:   Chief Complaint   Patient presents with    Cerebrovascular Accident     Arrives via EMS w/ c/o slurred speech, r-sided drooping, and visual field deficit. LKN 0030 today.         HPI: Regino Lawrence is a 79 y.o. female with CKD 3a, AICD, epilepsy, sarcoid on chronic steroids, hx of unprovoked PE on eliquis indefinitely, insuline dependent T2DM, hypertension, hyperlipidemia, GERD and SARI being admitted to hospital medicine for management of transient neurologic symptoms, SCOTT and hyponatremia. Patient presents with her daughter at bedside who helps with history. Report that she was watching TV last night and upon standing up suddenly had left facial droop, slurred speech, dizziness and generalized body weakness lasting 30 minutes - 1 hour. Reports the symptoms have since resolved and that she is back to baseline at the time of my evaluation. Denies any recent illness, fever, chills, N/V/D, abdominal pain, dysuria, hematuria, chest pain or shortness of breath. States she drinks plenty of water at home but does endorse poor appetite.         In ED: AFVSS. CBC without leukocytosis, hgb 8.6 which is near baseline. CMP shows SCOTT on CKD, creatinine 1.6 (baseline 1.1-1.2). TSH 2.762. troponin wnl. Stroke code activated in the ED. CTA stroke and MRI brain negative for acute intracranial hemorrhage or major vascular distribution infarct. Vasc neuro consulted, no acute intervention indicated. Given 1 L NS bolus in the ED. Admitted to hospital  medicine for further management.     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, without long-term current use of insulin     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, without long-term current use of insulin     Depression     Diabetes mellitus, type 2     Eye injury as a child     k-abrasion  od    Hyperlipidemia     Hypertension     Hypothyroidism     Immune deficiency disorder     Immune disorder     LOC (loss of consciousness) 2021    at home    Myalgia and myositis 2012    Osteoporosis     Polyneuropathy     Pulmonary embolism 07/10/2021    Renal manifestation of secondary diabetes mellitus     Sarcoidosis     Seizure     Type 2 diabetes mellitus     Ulcer     no cancer    Urinary incontinence        Past Surgical History:   Procedure Laterality Date    CARPAL TUNNEL RELEASE      Rt wrist    CATARACT EXTRACTION W/  INTRAOCULAR LENS IMPLANT Right 2015    Dr. Azevedo    CATARACT EXTRACTION W/  INTRAOCULAR LENS IMPLANT Left 2015    Dr. Azevedo    CERVICAL SPINE SURGERY       SECTION      CHOLECYSTECTOMY      INJECTION OF ANESTHETIC AGENT AROUND NERVE Left 2020    Procedure: BLOCK, NERVE LEFT FEMORAL AND OBTURATOR;  Surgeon: Alfonso Richards MD;  Location: [facility];  Service: Pain Management;  Laterality: Left;  NEEDS CONSENT    INJECTION OF ANESTHETIC AGENT AROUND NERVE Bilateral 10/09/2023    Procedure: BLOCK, NERVE, BILATERAL L3-L4-L5 MEDIAL BRANCH;  Surgeon: Alfonso Richards MD;  Location: [facility];  Service: Pain Management;  Laterality: Bilateral;    INJECTION OF JOINT Left 2019    Procedure: Injection, Joint  fLUOROSCOPIC jOINT  iNJECTION (hIP iNJECTION) LEFT ROCH BURSA AS WELL LEFT TROCHANTERIC BURSA;  Surgeon: Alfonso Richards MD;  Location: [facility];  Service: Pain Management;  Laterality: Left;  NEEDS CONSENT, DIABETIC    INJECTION OF JOINT Left 07/22/2019    Procedure: Injection, Joint FLUOROSCOPIC JOINT INJECTION (HIP INJECTION) LEFT HIP;  Surgeon: Alfonso Richards MD;  Location: [facility];  Service: Pain Management;  Laterality: Left;  NEEDS CONSENT    INJECTION OF JOINT Left 09/12/2019    Procedure: INJECTION, JOINT;  Surgeon: Alfonso Richards MD;  Location: [facility];  Service: Pain Management;  Laterality: Left;  Left Hip and Left GTB Injections    INJECTION OF JOINT Left 07/27/2020    Procedure: INJECTION, JOINT, LEFT HIP and LEFT GREATER TROCHANTERIC BURSA;  Surgeon: Alfonso Richards MD;  Location: [facility];  Service: Pain Management;  Laterality: Left;  INJECTION, JOINT, LEFT HIP and LEFT GREATER TROCHANTERIC BURSA    INJECTION OF JOINT Left 09/03/2020    Procedure: INJECTION, JOINT, LEFT SI;  Surgeon: Alfonso Richards MD;  Location: [facility];  Service: Pain Management;  Laterality: Left;  INJECTION, JOINT, LEFT SI    TRANSFORAMINAL EPIDURAL INJECTION OF STEROID Bilateral 12/05/2019    Procedure: INJECTION, STEROID, EPIDURAL, TRANSFORAMINAL APPROACH;  Surgeon: Alfonso Richards MD;  Location: [facility];  Service: Pain Management;  Laterality: Bilateral;  B/L TF RHIANNA L5  Consent Needed    TRANSFORAMINAL EPIDURAL INJECTION OF STEROID Bilateral 06/29/2020    Procedure: INJECTION, STEROID, EPIDURAL, TRANSFORAMINAL APPROACH L5/S1;  Surgeon: Alfonso Richards MD;  Location: [facility];  Service: Pain Management;  Laterality: Bilateral;  B/L TF RHIANNA L5/S1    TUBAL LIGATION         Review of patient's allergies indicates:   Allergen Reactions    Azathioprine Shortness Of Breath and Other (See Comments)     Fatigue       Current Facility-Administered Medications on File Prior to Encounter   Medication    denosumab (PROLIA) injection 60 mg      Current Outpatient Medications on File Prior to Encounter   Medication Sig    (Magic mouthwash) 1:1:1 diphenhydrAMINE(Benadryl) 12.5mg/5ml liq, aluminum & magnesium hydroxide-simethicone (Maalox), LIDOcaine viscous 2% Swish and spit 5 mLs every 4 (four) hours as needed (mouth pain). for mouth sores    ACCU-CHEK FASTCLIX LANCING DEV Kit USE AS DIRECTED.    albuterol-ipratropium (DUO-NEB) 2.5 mg-0.5 mg/3 mL nebulizer solution Take 3 mLs by nebulization every 4 (four) hours as needed for Wheezing or Shortness of Breath. Rescue    alpha lipoic acid 600 mg Cap Take 600 mg by mouth once daily.    apixaban (ELIQUIS) 5 mg Tab Take 1 tablet (5 mg total) by mouth 2 (two) times daily. Start this prescription after finishing starter pack    atorvastatin (LIPITOR) 20 MG tablet Take 1 tablet (20 mg total) by mouth once daily.    blood sugar diagnostic (ACCU-CHEK SMARTVIEW TEST STRIP) Strp Use as directed to check blood sugar twice daily.    blood sugar diagnostic Strp To check BG three times daily, to use with insurance preferred meter    blood-glucose meter kit Use as instructed    carvediloL (COREG) 25 MG tablet Take 1 tablet (25 mg total) by mouth 2 (two) times daily.    cetirizine (ZYRTEC) 10 MG tablet Take 1 tablet (10 mg total) by mouth once daily.    chlorthalidone (HYGROTEN) 25 MG Tab Take 1 tablet (25 mg total) by mouth once daily.    clotrimazole (LOTRIMIN) 1 % cream Apply topically 2 (two) times daily.    diclofenac sodium (VOLTAREN) 1 % Gel Apply 2 g topically once daily.    DULoxetine (CYMBALTA) 60 MG capsule Take 1 capsule (60 mg total) by mouth once daily.    fenofibrate 160 MG Tab Take 1 tablet by mouth once daily    fluticasone propion-salmeterol 115-21 mcg/dose (ADVAIR HFA) 115-21 mcg/actuation HFAA inhaler Inhale 2 puffs into the lungs every 12 (twelve) hours. Controller    folic acid (FOLVITE) 1 MG tablet Take 1 tablet (1,000 mcg total) by mouth once daily.    insulin glargine U-100, Lantus, (LANTUS  "SOLOSTAR U-100 INSULIN) 100 unit/mL (3 mL) InPn pen Inject 30 Units into the skin 2 (two) times a day.    levETIRAcetam (KEPPRA) 750 MG Tab Take 1 tablet (750 mg total) by mouth 2 (two) times daily.    levothyroxine (SYNTHROID) 50 MCG tablet Take 1 tablet (50 mcg total) by mouth before breakfast.    linaCLOtide (LINZESS) 72 mcg Cap capsule Take 1 capsule (72 mcg total) by mouth before breakfast.    memantine (NAMENDA) 5 MG Tab Take 1 tablet (5 mg total) by mouth 2 (two) times daily.    NIFEdipine (PROCARDIA-XL) 60 MG (OSM) 24 hr tablet Take 1 tablet (60 mg total) by mouth 2 (two) times a day.    pantoprazole (PROTONIX) 40 MG tablet Take 1 tablet (40 mg total) by mouth once daily.    pen needle, diabetic (BD ULTRA-FINE ANA PEN NEEDLE) 32 gauge x 5/32" Ndle For use with insulin pen    predniSONE (DELTASONE) 1 MG tablet TAKE 4 TABLETS BY MOUTH ONCE DAILY FOR  ONE  MONTH  THEN  DECREASE  BY  1  TABLET  EVERY  MONTH  IF  LABS  ALLOW    pregabalin (LYRICA) 75 MG capsule Take 1 capsule (75 mg total) by mouth 2 (two) times daily.    promethazine-dextromethorphan (PROMETHAZINE-DM) 6.25-15 mg/5 mL Syrp Take one tsp po q 6 hrs prn cough    tiZANidine (ZANAFLEX) 2 MG tablet Take 2 tablets (4 mg total) by mouth every 8 (eight) hours as needed (muscle spasm).    triazolam (HALCION) 0.25 MG Tab     [DISCONTINUED] gabapentin (NEURONTIN) 400 MG capsule Take 1 capsule (400 mg total) by mouth 3 (three) times daily     Family History       Problem Relation (Age of Onset)    Arthritis Sister    Cancer Sister    Cataracts Mother    Diabetes Son    Glaucoma Sister    Hepatitis Sister    Hypertension Mother, Maternal Grandmother, Son    Immunodeficiency Sister    Kidney failure Sister    No Known Problems Father, Brother, Maternal Aunt, Maternal Uncle, Paternal Aunt, Paternal Uncle, Maternal Grandfather, Paternal Grandmother, Paternal Grandfather          Tobacco Use    Smoking status: Never     Passive exposure: Never    Smokeless " tobacco: Never   Substance and Sexual Activity    Alcohol use: No    Drug use: No    Sexual activity: Not Currently     Partners: Male     Review of Systems   Constitutional:  Positive for appetite change (decreased). Negative for activity change, chills and fever.   HENT:  Negative for trouble swallowing.    Eyes:  Negative for photophobia and visual disturbance.   Respiratory:  Negative for cough, chest tightness and shortness of breath.    Cardiovascular:  Negative for chest pain, palpitations and leg swelling.   Gastrointestinal:  Negative for abdominal pain, constipation, diarrhea, nausea and vomiting.   Genitourinary:  Negative for dysuria, frequency and hematuria.   Musculoskeletal:  Positive for arthralgias (2/2 chronic pain) and gait problem (uses walker/power scooter at baseline). Negative for back pain and neck pain.   Skin:  Negative for rash and wound.   Neurological:  Positive for dizziness (resolved), facial asymmetry (resolved), speech difficulty (resolved) and weakness (resolved). Negative for syncope and light-headedness.   Psychiatric/Behavioral:  Negative for agitation and confusion. The patient is not nervous/anxious.      Objective:     Vital Signs (Most Recent):  Temp: 97.9 °F (36.6 °C) (03/24/25 0120)  Pulse: 81 (03/24/25 0917)  Resp: 15 (03/24/25 0917)  BP: 108/61 (03/24/25 0917)  SpO2: 99 % (03/24/25 0917) Vital Signs (24h Range):  Temp:  [97.9 °F (36.6 °C)] 97.9 °F (36.6 °C)  Pulse:  [76-87] 81  Resp:  [10-20] 15  SpO2:  [95 %-100 %] 99 %  BP: (100-119)/(55-70) 108/61     Weight: 69.4 kg (153 lb)  Body mass index is 27.98 kg/m².     Physical Exam  Vitals and nursing note reviewed.   Constitutional:       General: She is not in acute distress.     Appearance: She is well-developed. She is ill-appearing (chronically). She is not toxic-appearing.   HENT:      Head: Normocephalic and atraumatic.   Eyes:      Extraocular Movements: Extraocular movements intact.   Cardiovascular:      Rate and  Rhythm: Normal rate and regular rhythm.      Heart sounds: Normal heart sounds.   Pulmonary:      Effort: Pulmonary effort is normal. No respiratory distress.   Abdominal:      General: There is no distension.      Tenderness: There is no abdominal tenderness.   Musculoskeletal:         General: No tenderness. Normal range of motion.      Right lower le+ Pitting Edema present.      Left lower le+ Pitting Edema present.   Skin:     General: Skin is warm and dry.      Findings: No rash.   Neurological:      General: No focal deficit present.      Mental Status: She is alert and oriented to person, place, and time.      Cranial Nerves: No cranial nerve deficit, dysarthria or facial asymmetry.      Motor: Weakness (2/5 strength to BLE, baseline. Weak left hand , chronic) present.   Psychiatric:         Behavior: Behavior normal.         Thought Content: Thought content normal.         Judgment: Judgment normal.        Significant Labs: All pertinent labs within the past 24 hours have been reviewed.  CBC:   Recent Labs   Lab 25   WBC  --   --  4.95   HGB  --   --  8.6*   HCT 29* 27* 25.2*   PLT  --   --  256     CMP:   Recent Labs   Lab 25  042   * 123*   K 3.7 3.1*   CL 89* 89*   CO2 23 23   BUN 29* 29*   CREATININE 1.6* 1.4   CALCIUM 8.4* 8.6*   ALBUMIN 3.3*  --    BILITOT 0.3  --    ALKPHOS 62  --    AST 36  --    ALT 20  --    ANIONGAP 12 11     Coagulation:   Recent Labs   Lab 25   INR 1.2     Troponin:   Recent Labs   Lab 25   TROPONINIHS <3     TSH:   Recent Labs   Lab 25   TSH 2.762     Significant Imaging: I have reviewed all pertinent imaging results/findings within the past 24 hours.  Imaging Results              MRI Brain Without Contrast (Final result)  Result time 25 05:00:22      Final result by Yayo Luis MD (25 05:00:22)                   Impression:      No evidence of  acute intracranial hemorrhage or major vascular distribution infarct.    Pattern of generalized cerebral volume loss and chronic small vessel ischemic changes.    Electronically signed by resident: Haris Gonzalez  Date:    03/24/2025  Time:    04:09    Electronically signed by: Yayo Luis  Date:    03/24/2025  Time:    05:00               Narrative:    EXAMINATION:  MRI BRAIN WITHOUT CONTRAST    CLINICAL HISTORY:  aphasia;    TECHNIQUE:  Multiplanar multisequence MR imaging of the brain was performed without intravenous contrast.    COMPARISON:  CTA stroke 03/24/2025, MRI brain 11/04/2024    FINDINGS:  Numerous sequences are significantly degraded by patient motion.    Intracranial Compartment:    Ventricles are prominent, unchanged in size with mild widening of cerebral sulci compatible with generalized volume loss.  No hydrocephalus.    Moderate patchy and confluent T2/FLAIR hyperintensity in the supratentorial white matter, nonspecific but most likely reflecting chronic small vessel ischemic changes.  Remote lacunar type infarcts in the bilateral basal ganglia.  No diffusion restriction to suggest acute infarct.  No mass, hemorrhage, mass effect, or midline shift.  Susceptibility artifact in the bilateral cerebellar hemispheres and right thalamus suggesting remote microhemorrhages.    No extra-axial blood or fluid collections.    Normal vascular flow voids are preserved.    Skull/Extracranial Contents (limited evaluation):    Bone marrow signal intensity is normal.    Pre-existing C2-C3 anterolisthesis with associated spinal stenosis.    The known C3 through C7 laminectomies and posterior instrumented fusion are partially visualized                                        CTA STROKE MULTI-PHASE (XPD) (Final result)  Result time 03/24/25 04:11:29      Final result by Yayo Luis MD (03/24/25 04:11:29)                   Impression:      No intracranial hemorrhage or major vascular distribution infarct.   Consider MRI brain if further evaluation is required.    No high-grade vascular stenosis or occlusion in the craniocervical cerebrovascular arterial circulation.    Suspected 3.5 x 2.6 x 3.9 mm aneurysm of the right A1 CHLOE or anterior communicating artery.  Correlate clinically and with appropriate follow-up.    Small air-fluid level within the upper thoracic esophagus could be related to gastroesophageal reflux, alteration of esophageal motility, or perhaps a distal esophageal stricture (whether benign or malignant).  Recommendations include clinical correlation and follow-up outpatient EGD.    Note that this finding could potentially predispose to aspiration.    Chronic C2-C3 anterolisthesis, demonstrated previously, with consequent spinal stenosis.  Underlying spinal cord poorly visualized, limiting assessment for any associated spinal cord compression.  Correlate clinically.    Additional observations as detailed in the body of the report.    This report was flagged in Epic as abnormal.    Electronically signed by resident: Haris Gonzalez  Date:    03/24/2025  Time:    02:20    Electronically signed by: Yayo Luis  Date:    03/24/2025  Time:    04:11               Narrative:    EXAMINATION:  CTA STROKE MULTI-PHASE (XPD)    CLINICAL HISTORY:  Neuro deficit, acute, stroke suspected;    TECHNIQUE:  Axial CT images obtained throughout the region of the head before the administration of intravenous contrast.    CT angiogram was performed through the cervical and intracranial vasculature during the IV bolus administration of 75mL of Omnipaque 350.  Two additional phases through the intracranial vasculature via multiphase technique.    Multiplanar MPR and MIP reformats were performed.    CT source data was analyzed using artificial intelligence software for detection of a large vessel occlusions (LVO) in order to enable computer assisted triage notification and aid clinical stroke decision  making.    COMPARISON:  MRA brain 02/26/2025    MRI brain 11/04/2025    CT head 03/11/2024    Cervical spine CT 03/11/2024    FINDINGS:  Unchanged prominence of the ventricles without evidence of hydrocephalus.    Moderate patchy hypoattenuation in the supratentorial white matter, nonspecific but most likely reflecting chronic microvascular ischemic changes.    Remote lacunar type infarct of the right basal ganglia.    No major vascular distribution brain infarct.    No acute intracranial hemorrhage.    No intracranial mass effect or midline shift.    No extra-axial blood or fluid collections.    The cranium appears intact. Mastoid air cells and paranasal sinuses are essentially clear.      CTA:    The aortic arch demonstratesmild atherosclerosis with no significant stenosis at the major branch vessel origins.    The right common carotid artery is normal in caliber.  No significant stenosis at the carotid bifurcation.The right internal carotid artery is normal in caliber and mild atherosclerosis in the cavernous segment..    The left common carotid artery is normal in caliber. No significant stenosis at the carotid bifurcation.The left internal carotid artery demonstrates moderate atherosclerosis in the cavernous segment with mild luminal narrowing.  The left internal carotid artery is otherwise normal in caliber.    The right vertebral artery is normal in caliber.    The left vertebral artery is normal in caliber.    Basilar artery is within normal limits without focal abnormality.    The proximal anterior cerebral arteries appear patent.    A slightly irregularly shaped, approximately 3.5 x 2.6 x 3.9 mm outpouching of the right A1 CHLOE or anterior communicating artery is suspicious for an intracranial aneurysm (series 5, image 359).    Proximal middle and posterior cerebral arteries are within normal limits.  No evidence of significant stenosis or focal occlusion.    Additional CT findings: Intraluminal gas and  fluid within the partially distended upper thoracic esophagus.    Visualized proximal airways are patent    Mosaic attenuation of the bilateral lung apices, possibly reflecting small airways disease, mosaic perfusion, or edema.  No consolidation, pneumothorax, or pleural fluid in the visualized upper chest..    No acute fracture deformity in the visualized skeleton.    Pre-existing grade 1/grade 2 C2-C3 anterolisthesis (causing some spinal stenosis; the underlying spinal cord is poorly visualized on these CT images, limiting definitive assessment for any associated spinal cord compression), and C3-C7 laminectomies with bilateral posterior instrumented cervical fusion, all similar to comparison images.                                    Assessment/Plan:     * Acute renal failure superimposed on stage 3a chronic kidney disease  Stage 3a chronic kidney disease   Suspect etiology pre-renal.  Lab Results   Component Value Date    CREATININE 1.6 (H) 03/24/2025   - Baseline Cr 1.1-1.2  - No signs or symptoms of uremia  - UA pending   - Renal US if no improvement in 24-48 hrs   - hold ACE-I or ARB while renal function dynamic  - Monitor UOP and follow serial BMP   - Adjust drugs to GFR/CrCL; avoid nephrotoxic drugs   -  Estimated Creatinine Clearance: 26 mL/min (A) (based on SCr of 1.6 mg/dL (H)).   - Monitor electrolytes, phos levels daily    Episode of transient neurologic symptoms  Dizziness   Unclear etiology; TIA vs epilepsy related vs hypotension?  - per chart review she had similar presentation and admission in 2019 associated with dehydration. Stroke workup negative at that time as well. Seen by neurology at that time, diagnosed with possible TIA and discharged home with secondary stroke prevention  - stroke code in ED  - CTA and MRI brain negative for acute strokes   - keppra level pending   - neuro checks q4h     History of pulmonary embolus (PE)  Long term (current) use of anticoagulants   - unprovoked, on AC  "indefinitely   - continue home eliquis 5 mg BID     Hyponatremia  Hyponatremia is likely due to Dehydration/hypovolemia. The patient's most recent sodium results are listed below.  Recent Labs     03/24/25  0149   *     Plan  - Correct the sodium by 4-6mEq in 24 hours.   - Obtain the following studies: Urine sodium, urine osmolality, serum osmolality or TSH, T4.  - Will treat the hyponatremia with IV fluids as follows: received 1L NS in the ED   - Monitor sodium Daily.   - Patient hyponatremia is stable    Hypokalemia  Patient's most recent potassium results are listed below.   Recent Labs     03/24/25  0149 03/24/25  0422   K 3.7 3.1*     Plan  - Replete potassium per protocol  - Monitor potassium Daily  - Patient's hypokalemia is stable    Controlled type 2 diabetes mellitus with diabetic polyneuropathy, with long-term current use of insulin  She is unsure what medications she takes, last insulin glargine rx fill was in 11/2024  - Last A1c reviewed-   Lab Results   Component Value Date    HGBA1C 5.2 01/25/2025     - Most recent fingerstick glucose reviewed- No results for input(s): "POCTGLUCOSE" in the last 24 hours.  - Current correctional scale Low  Maintain anti-hyperglycemic dose as follows-   Antihyperglycemics (From admission, onward)      Start     Stop Route Frequency Ordered    03/24/25 0911  insulin aspart U-100 pen 0-5 Units         -- SubQ Before meals & nightly PRN 03/24/25 0814            Nonintractable epilepsy without status epilepticus  - per last neuro note 02/2025  Unclear etiology, still having breakthrough seizures. We will increase Keppra to 1 g b.i.d. with reinforcement regarding compliance.   - continue keprra 1 g BID   - keppra level pending   - seizure precautions     Sarcoidosis  Immunosuppression due to chronic steroid use   - continue home daily prednisone, not on any other medications for management     Debility  Antalgic gait  Patient with Chronic debility due to age-related " physical debility. The patient's latest AMPAC (Activity Measure for Post Acute Care) Score is listed below.    AM-PAC Score - How much help does the patient need for each activity listed     Plan  - Progressive mobility protocol initated  - Fall precautions in place  - pressure injury prevention orders in place     Hypocalcemia  - corrected calcium for albumin is 9.2  - ionized calcium level pending   - daily BMP    Recent Labs     03/24/25  0149 03/24/25  0422   CALCIUM 8.4* 8.6*   ALBUMIN 3.3*  --        GERD (gastroesophageal reflux disease)  - continue home protonix    Cervical spinal stenosis  - chronic   - continue home cymbalta and lyrica     Hypertension associated with stage 3a chronic kidney disease due to type 2 diabetes mellitus  - normal to hypotensive on admission  - hold home coreg and nifedipine for now  - restart as indicated  - vitals 4qh     Combined 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  Lab Results   Component Value Date    LDLCALC 51.8 (L) 01/25/2025   - continue home statin    Hypothyroidism  Lab Results   Component Value Date    TSH 2.762 03/24/2025    K3AFOBF 70 06/13/2022    FREET4 1.53 (H) 06/13/2022   - continue home synthroid       VTE Risk Mitigation (From admission, onward)           Ordered     apixaban tablet 5 mg  2 times daily         03/24/25 0947     Reason for No Pharmacological VTE Prophylaxis  Once        Question:  Reasons:  Answer:  Already adequately anticoagulated on oral Anticoagulants    03/24/25 0814     IP VTE HIGH RISK PATIENT  Once         03/24/25 0814     Place sequential compression device  Until discontinued         03/24/25 0814                Ya Lindsey PA-C  Department of Hospital Medicine  [facility]

## 2025-03-24 NOTE — SUBJECTIVE & OBJECTIVE
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, without long-term current use of insulin     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, without long-term current use of insulin     Depression     Diabetes mellitus, type 2     Eye injury as a child     k-abrasion  od    Hyperlipidemia     Hypertension     Hypothyroidism     Immune deficiency disorder     Immune disorder     LOC (loss of consciousness) 2021    at home    Myalgia and myositis 2012    Osteoporosis     Polyneuropathy     Pulmonary embolism 07/10/2021    Renal manifestation of secondary diabetes mellitus     Sarcoidosis     Seizure     Type 2 diabetes mellitus     Ulcer     no cancer    Urinary incontinence      Past Surgical History:   Procedure Laterality Date    CARPAL TUNNEL RELEASE      Rt wrist    CATARACT EXTRACTION W/  INTRAOCULAR LENS IMPLANT Right 2015    Dr. Azevedo    CATARACT EXTRACTION W/  INTRAOCULAR LENS IMPLANT Left 2015    Dr. Azevedo    CERVICAL SPINE SURGERY       SECTION      CHOLECYSTECTOMY      INJECTION OF ANESTHETIC AGENT AROUND NERVE Left 2020    Procedure: BLOCK, NERVE LEFT FEMORAL AND OBTURATOR;  Surgeon: Alfonso Richards MD;  Location: [facility] PAIN [facility]GT;  Service: Pain Management;  Laterality: Left;  NEEDS CONSENT    INJECTION OF ANESTHETIC AGENT AROUND NERVE Bilateral 10/09/2023    Procedure: BLOCK, NERVE, BILATERAL L3-L4-L5 MEDIAL BRANCH;  Surgeon: Alfonso Richards MD;  Location: [facility] PAIN [facility]GT;  Service: Pain Management;  Laterality: Bilateral;    INJECTION OF JOINT Left 2019    Procedure: Injection, Joint  fLUOROSCOPIC jOINT iNJECTION (hIP iNJECTION) LEFT ROCH  BURSA AS WELL LEFT TROCHANTERIC BURSA;  Surgeon: Alfonso Richards MD;  Location: [facility] PAIN [facility]GT;  Service: Pain Management;  Laterality: Left;  NEEDS CONSENT, DIABETIC    INJECTION OF JOINT Left 07/22/2019    Procedure: Injection, Joint FLUOROSCOPIC JOINT INJECTION (HIP INJECTION) LEFT HIP;  Surgeon: Alfonso Richards MD;  Location: [facility] PAIN [facility]GT;  Service: Pain Management;  Laterality: Left;  NEEDS CONSENT    INJECTION OF JOINT Left 09/12/2019    Procedure: INJECTION, JOINT;  Surgeon: Alfonso Richards MD;  Location: [facility] PAIN [facility]GT;  Service: Pain Management;  Laterality: Left;  Left Hip and Left GTB Injections    INJECTION OF JOINT Left 07/27/2020    Procedure: INJECTION, JOINT, LEFT HIP and LEFT GREATER TROCHANTERIC BURSA;  Surgeon: Alfonso Richards MD;  Location: [facility] PAIN [facility]GT;  Service: Pain Management;  Laterality: Left;  INJECTION, JOINT, LEFT HIP and LEFT GREATER TROCHANTERIC BURSA    INJECTION OF JOINT Left 09/03/2020    Procedure: INJECTION, JOINT, LEFT SI;  Surgeon: Alfonso Richards MD;  Location: [facility]IN [facility];  Service: Pain Management;  Laterality: Left;  INJECTION, JOINT, LEFT SI    TRANSFORAMINAL EPIDURAL INJECTION OF STEROID Bilateral 12/05/2019    Procedure: INJECTION, STEROID, EPIDURAL, TRANSFORAMINAL APPROACH;  Surgeon: Alfonso Richards MD;  Location: [facility] PAIN [facility]GT;  Service: Pain Management;  Laterality: Bilateral;  B/L TF RHIANNA L5  Consent Needed    TRANSFORAMINAL EPIDURAL INJECTION OF STEROID Bilateral 06/29/2020    Procedure: INJECTION, STEROID, EPIDURAL, TRANSFORAMINAL APPROACH L5/S1;  Surgeon: Alfonso Richards MD;  Location: [facility] PAIN [facility]GT;  Service: Pain Management;  Laterality: Bilateral;  B/L TF RHIANNA L5/S1    TUBAL LIGATION       Social History[1]  Review of patient's allergies indicates:   Allergen Reactions    Azathioprine Shortness Of Breath and Other (See Comments)     Fatigue       Medications: I have reviewed the current medication administration record.    Prescriptions Prior to Admission[2]    Review of Systems    Neurological:  Positive for dizziness and speech difficulty.   All other systems reviewed and are negative.    Objective:     Vital Signs (Most Recent):  Temp: 97.9 °F (36.6 °C) (03/24/25 0120)  Pulse: 77 (03/24/25 0220)  Resp: 14 (03/24/25 0220)  BP: (!) 108/57 (03/24/25 0220)  SpO2: 95 % (03/24/25 0220)    Vital Signs Range (Last 24H):  Temp:  [97.9 °F (36.6 °C)]   Pulse:  [76-87]   Resp:  [14-20]   BP: (100-119)/(55-70)   SpO2:  [95 %-100 %]        Physical Exam  Vitals and nursing note reviewed.   Constitutional:       General: She is not in acute distress.  HENT:      Head: Normocephalic and atraumatic.      Mouth/Throat:      Mouth: Mucous membranes are moist.   Eyes:      Extraocular Movements: Extraocular movements intact.      Pupils: Pupils are equal, round, and reactive to light.   Cardiovascular:      Rate and Rhythm: Normal rate.   Pulmonary:      Effort: Pulmonary effort is normal. No respiratory distress.   Abdominal:      Tenderness: There is no guarding.   Skin:     General: Skin is warm and dry.   Neurological:      Mental Status: She is alert and oriented to person, place, and time.      Cranial Nerves: No cranial nerve deficit.      Sensory: No sensory deficit.   Psychiatric:         Mood and Affect: Mood normal.         Behavior: Behavior normal.              Neurological Exam:   LOC: drowsy  Attention Span: Good   Language: No aphasia  Articulation: Dysarthria  Orientation: Person, Place, Time   Visual Fields: Full  EOM (CN III, IV, VI): Full/intact  Pupils (CN II, III): PERRL  Facial Sensation (CN V): Normal  Facial Movement (CN VII): Symmetric facial expression    Motor: Arm left  Paresis: 4/5  Leg left  Paresis: 4/5  Arm right  Paresis: 4/5  Leg right Paresis: 4/5  Sensation: Intact to light touch, temperature and vibration      Laboratory:  CMP:   Recent Labs   Lab 03/24/25  0149   GLUCOSE 184*   CALCIUM 8.4*   ALBUMIN 3.3*   *   K 3.7   CO2 23   CL 89*   BUN 29*   CREATININE 1.6*  "  ALKPHOS 62   ALT 20   AST 36   BILITOT 0.3     CBC:   Recent Labs   Lab 03/24/25  0149   WBC 4.95   RBC 2.55*   HGB 8.6*   HCT 25.2*      MCV 99*   MCH 33.7   MCHC 34.1     Lipid Panel:   Recent Labs   Lab 03/24/25  0149   CHOL 107*   HDL 35*   TRIG 127     Coagulation:   Recent Labs   Lab 03/24/25  0149   INR 1.2     Hgb A1C: No results for input(s): "HGBA1C" in the last 168 hours.  TSH:   Recent Labs   Lab 03/24/25  0149   TSH 2.762       Diagnostic Results:      Brain imaging/Vessel Imaging:    CTA Stroke MP - 3/24/2025    Official radiology read unavailable. Imaging reviewed by stroke team. No acute intracranial abnormalities, no large vessel occlusion.     Cardiac Evaluation:     EKG from today; NSR, vet rate of 88 bpm       [1]   Social History  Tobacco Use    Smoking status: Never     Passive exposure: Never    Smokeless tobacco: Never   Substance Use Topics    Alcohol use: No    Drug use: No   [2] (Not in a hospital admission)    "

## 2025-03-24 NOTE — ASSESSMENT & PLAN NOTE
Immunosuppression due to chronic steroid use   - continue home daily prednisone, not on any other medications for management

## 2025-03-24 NOTE — PHARMACY MED REC
"Admission Medication History     The home medication history was taken by Cristobal Rosenberg.    You may go to "Admission" then "Reconcile Home Medications" tabs to review and/or act upon these items.     The home medication list has been updated by the Pharmacy department.   Please read ALL comments highlighted in yellow.   Please address this information as you see fit.    Feel free to contact us if you have any questions or require assistance.      The medications listed below were removed from the home medication list. Please reorder if appropriate:  Patient reports no longer taking the following medication(s):  PROMETHAZINE-DEXTROMETHORPHAN 6.25-15 MG/5 ML  TRIAZOLAM 0.25 MG    Medications listed below were obtained from: Patient/family and Analytic software- [employer]  Current Outpatient Medications on File Prior to Encounter   Medication Sig    (Magic mouthwash) 1:1:1 diphenhydrAMINE(Benadryl) 12.5mg/5ml liq, aluminum & magnesium hydroxide-simethicone (Maalox), LIDOcaine viscous 2% Apply topically to mouth ulcers twice daily.    apixaban (ELIQUIS) 5 mg Tab Take 1 tablet (5 mg total) by mouth 2 (two) times daily. Start this prescription after finishing starter pack    atorvastatin (LIPITOR) 20 MG tablet Take 1 tablet (20 mg total) by mouth once daily.    calcium carb/vit D3/minerals (CALCIUM-VITAMIN D ORAL) Take 1 tablet by mouth once daily.    carvediloL (COREG) 25 MG tablet Take 1 tablet (25 mg total) by mouth 2 (two) times daily.    cetirizine (ZYRTEC) 10 MG tablet Take 1 tablet (10 mg total) by mouth once daily.    chlorthalidone (HYGROTEN) 25 MG Tab Take 1 tablet (25 mg total) by mouth once daily.    clotrimazole (LOTRIMIN) 1 % cream Apply topically 2 (two) times daily.    cyanocobalamin, vitamin B-12, (VITAMIN B-12 ORAL) Take 1 tablet by mouth once daily.    diclofenac sodium (VOLTAREN) 1 % Gel Apply 2 g topically once daily.    DULoxetine (CYMBALTA) 60 MG capsule Take 1 capsule (60 mg total) by mouth once " daily.    fenofibrate 160 MG Tab Take 1 tablet by mouth once daily    folic acid (FOLVITE) 1 MG tablet Take 1 tablet (1,000 mcg total) by mouth once daily.    insulin glargine U-100, Lantus, (LANTUS SOLOSTAR U-100 INSULIN) 100 unit/mL (3 mL) InPn pen Inject 30 Units into the skin. 1-2 times daily.    levETIRAcetam (KEPPRA) 750 MG Tab Take 1 tablet (750 mg total) by mouth 2 (two) times daily.    levothyroxine (SYNTHROID) 50 MCG tablet Take 1 tablet (50 mcg total) by mouth before breakfast.    linaCLOtide (LINZESS) 72 mcg Cap capsule Take 1 capsule (72 mcg total) by mouth before breakfast.    memantine (NAMENDA) 5 MG Tab Take 1 tablet (5 mg total) by mouth once daily.    NIFEdipine (PROCARDIA-XL) 60 MG (OSM) 24 hr tablet Take 1 tablet (60 mg total) by mouth 2 (two) times a day.    pantoprazole (PROTONIX) 40 MG tablet Take 1 tablet (40 mg total) by mouth once daily.    predniSONE (DELTASONE) 1 MG tablet Take 2 mg by mouth once daily.    pregabalin (LYRICA) 75 MG capsule Take 1 capsule (75 mg total) by mouth 2 (two) times daily.    tiZANidine (ZANAFLEX) 2 MG tablet Take 2 tablets (4 mg total) by mouth every 8 (eight) hours as needed (muscle spasm).    ACCU-CHEK FASTCLIX LANCING DEV Kit USE AS DIRECTED.    albuterol-ipratropium (DUO-NEB) 2.5 mg-0.5 mg/3 mL nebulizer solution Take 3 mLs by nebulization every 4 (four) hours as needed for Wheezing or Shortness of Breath. Rescue    alpha lipoic acid 600 mg Cap Take 600 mg by mouth once daily.    blood sugar diagnostic (ACCU-CHEK SMARTVIEW TEST STRIP) Strp Use as directed to check blood sugar twice daily.    blood sugar diagnostic Strp To check BG three times daily, to use with insurance preferred meter    blood-glucose meter kit Use as instructed    fluticasone propion-salmeterol 115-21 mcg/dose (ADVAIR HFA) 115-21 mcg/actuation HFAA inhaler Inhale 2 puffs into the lungs every 12 (twelve) hours. Controller    pen needle, diabetic (BD ULTRA-FINE ANA PEN NEEDLE) 32 gauge x  "5/32" Ndle For use with insulin pen                 Cristobal Rosenberg  EXT [id number]                  .          "

## 2025-03-24 NOTE — ASSESSMENT & PLAN NOTE
- corrected calcium for albumin is 9.2  - ionized calcium level pending   - daily BMP    Recent Labs     03/24/25  0149 03/24/25  0422   CALCIUM 8.4* 8.6*   ALBUMIN 3.3*  --

## 2025-03-24 NOTE — ASSESSMENT & PLAN NOTE
79 y.o. female with PMHx of HTN, HLD, T2DM, pulmonary embolism (on eliquis, last dose last night), CKD, hypothyroidism, GERD who presents to the [facility] [place] ED via EMS for slurred speech and dizziness. Patient reports she was watching tv with her daughter and she got up to go to the kitchen and suddenly felt dizzy with slurred speech. She felt her tongue was big in her mouth and she told her daughter she feels she is having a stroke. Daughter called EMS. She denies any syncopal episodes. Denies any other symptoms. LKN 0030. Stroke code activated on arrival to the ED.     -;   -NIHSS 6 for lethargy, mild dysarthria and generalized weakness (drifts in all extremities; uses a walker at home). Endorse dizziness with head movement.    -CTA Stroke MP with no evidence for major territorial infarction or acute intracranial hemorrhage; no large vessel occlusion.  -Patient not a candidate for acute intervention at this time. No TNK due to eliquis use and non-disabling symptoms. No IR as no acute LVO on CTA and symptoms not concerning for LVO at this time.  -Recommend workup for peripheral etiology, hallpike, etc due to dizziness with head movement.    -Can obtain MRI Brain WO to definitely rule out acute stroke if all other workup negative.   -Allow for permissive hypertension; SBP<220/110 until acute stroke ruled out  -Case discussed with on-call fellow.     Thank you for your consult. Vascular neurology to follow up on imaging if obtained. Please do not hesitate to contact us at [phone] for any questions or concerns.

## 2025-03-24 NOTE — ASSESSMENT & PLAN NOTE
- per last neuro note 02/2025  Unclear etiology, still having breakthrough seizures. We will increase Keppra to 1 g b.i.d. with reinforcement regarding compliance.   - continue keprra 1 g BID   - keppra level pending   - seizure precautions

## 2025-03-24 NOTE — HPI
Regino Lawrence is a 79 y.o. female with CKD 3a, AICD, epilepsy, sarcoid on chronic steroids, hx of unprovoked PE on eliquis indefinitely, insuline dependent T2DM, hypertension, hyperlipidemia, GERD and SARI being admitted to hospital medicine for management of transient neurologic symptoms, SCOTT and hyponatremia. Patient presents with her daughter at bedside who helps with history. Report that she was watching TV last night and upon standing up suddenly had left facial droop, slurred speech, dizziness and generalized body weakness lasting 30 minutes - 1 hour. Reports the symptoms have since resolved and that she is back to baseline at the time of my evaluation. Denies any recent illness, fever, chills, N/V/D, abdominal pain, dysuria, hematuria, chest pain or shortness of breath. States she drinks plenty of water at home but does endorse poor appetite.         In ED: AFVSS. CBC without leukocytosis, hgb 8.6 which is near baseline. CMP shows SCOTT on CKD, creatinine 1.6 (baseline 1.1-1.2). TSH 2.762. troponin wnl. Stroke code activated in the ED. CTA stroke and MRI brain negative for acute intracranial hemorrhage or major vascular distribution infarct. [facility], no acute intervention indicated. Given 1 L NS bolus in the ED. Admitted to hospital medicine for further management.

## 2025-03-24 NOTE — ED NOTES
I-STAT Chem-8+ Results:   Value Reference Range   Sodium 122 136-145 mmol/L   Potassium  3.9 3.5-5.1 mmol/L   Chloride 86  mmol/L   Ionized Calcium 1.03 1.06-1.42 mmol/L   CO2 (measured) 26 23-29 mmol/L   Glucose 193  mg/dL   BUN 31 6-30 mg/dL   Creatinine 1.8 0.5-1.4 mg/dL   Hematocrit 27 36-54%

## 2025-03-24 NOTE — ED PROVIDER NOTES
Encounter Date: 3/24/2025       History     Chief Complaint   Patient presents with    Cerebrovascular Accident     Arrives via EMS w/ c/o slurred speech, r-sided drooping, and visual field deficit. LKN 0030 today.      HPI  79 y.o. female with PMHx of HTN, HLD, T2DM, pulmonary embolism (on eliquis, last dose last night), CKD, hypothyroidism, GERD who presents to the [facility] ED via EMS for slurred speech, facial droop, and dizziness. Patient and her daughter report they were watching tv together when she got up to go to the kitchen and suddenly felt dizzy with slurred speech. Daughter noticed facial droop. Daughter called EMS. She denies any syncopal episodes. Denies cp, sob, n/v/d, lightheadedness, abdominal pain, bleeding, melena.    Review of patient's allergies indicates:   Allergen Reactions    Azathioprine Shortness Of Breath and Other (See Comments)     Fatigue     Past Medical History:   Diagnosis Date    Acid reflux     Allergy     Alopecia     Anemia     Anemia in CKD (chronic kidney disease) 09/22/2016    Anxiety     Arthritis     Back pain     Cataract     Chronic kidney disease     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, without long-term current use of insulin     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, without long-term current use of insulin     Depression     Diabetes mellitus, type 2     Eye injury as a child     k-abrasion  od    Hyperlipidemia     Hypertension     Hypothyroidism     Immune deficiency disorder     Immune disorder     LOC (loss of consciousness) 03/12/2021    at home    Myalgia and myositis 09/06/2012    Osteoporosis     Polyneuropathy     Pulmonary embolism 07/10/2021    Renal manifestation of secondary diabetes mellitus     Sarcoidosis     Seizure     Type 2 diabetes mellitus     Ulcer     no cancer    Urinary incontinence      Past Surgical History:   Procedure Laterality Date     CARPAL TUNNEL RELEASE      Rt wrist    CATARACT EXTRACTION W/  INTRAOCULAR LENS IMPLANT Right 2015    Dr. Azevedo    CATARACT EXTRACTION W/  INTRAOCULAR LENS IMPLANT Left 2015    Dr. Azevedo    CERVICAL SPINE SURGERY       SECTION      CHOLECYSTECTOMY      INJECTION OF ANESTHETIC AGENT AROUND NERVE Left 2020    Procedure: BLOCK, NERVE LEFT FEMORAL AND OBTURATOR;  Surgeon: Alfonso Richards MD;  Location: [facility];  Service: Pain Management;  Laterality: Left;  NEEDS CONSENT    INJECTION OF ANESTHETIC AGENT AROUND NERVE Bilateral 10/09/2023    Procedure: BLOCK, NERVE, BILATERAL L3-L4-L5 MEDIAL BRANCH;  Surgeon: Alfonso Richards MD;  Location: [facility];  Service: Pain Management;  Laterality: Bilateral;    INJECTION OF JOINT Left 2019    Procedure: Injection, Joint  fLUOROSCOPIC jOINT iNJECTION (hIP iNJECTION) LEFT ROCH BURSA AS WELL LEFT TROCHANTERIC BURSA;  Surgeon: Alfonso Richards MD;  Location: [facility];  Service: Pain Management;  Laterality: Left;  NEEDS CONSENT, DIABETIC    INJECTION OF JOINT Left 2019    Procedure: Injection, Joint FLUOROSCOPIC JOINT INJECTION (HIP INJECTION) LEFT HIP;  Surgeon: Alfonso Richards MD;  Location: [facility];  Service: Pain Management;  Laterality: Left;  NEEDS CONSENT    INJECTION OF JOINT Left 2019    Procedure: INJECTION, JOINT;  Surgeon: Alfonso Richards MD;  Location: [facility];  Service: Pain Management;  Laterality: Left;  Left Hip and Left GTB Injections    INJECTION OF JOINT Left 2020    Procedure: INJECTION, JOINT, LEFT HIP and LEFT GREATER TROCHANTERIC BURSA;  Surgeon: Alfonso Richards MD;  Location: [facility];  Service: Pain Management;  Laterality: Left;  INJECTION, JOINT, LEFT HIP and LEFT GREATER TROCHANTERIC BURSA    INJECTION OF JOINT Left 2020    Procedure: INJECTION, JOINT, LEFT SI;  Surgeon: Alfonso Richards MD;  Location: [facility];  Service: Pain Management;  Laterality: Left;  INJECTION, JOINT,  LEFT SI    TRANSFORAMINAL EPIDURAL INJECTION OF STEROID Bilateral 12/05/2019    Procedure: INJECTION, STEROID, EPIDURAL, TRANSFORAMINAL APPROACH;  Surgeon: Alfonso Richards MD;  Location: [facility];  Service: Pain Management;  Laterality: Bilateral;  B/L TF RHIANNA L5  Consent Needed    TRANSFORAMINAL EPIDURAL INJECTION OF STEROID Bilateral 06/29/2020    Procedure: INJECTION, STEROID, EPIDURAL, TRANSFORAMINAL APPROACH L5/S1;  Surgeon: Alfonso Richards MD;  Location: [facility];  Service: Pain Management;  Laterality: Bilateral;  B/L TF RHIANNA L5/S1    TUBAL LIGATION       Family History   Problem Relation Name Age of Onset    Hypertension Mother Martial     Cataracts Mother Martial     No Known Problems Father      Hypertension Maternal Grandmother Nora     Glaucoma Sister Cristina     Arthritis Sister Cristina     No Known Problems Brother Kofi     No Known Problems Maternal Aunt      No Known Problems Maternal Uncle      No Known Problems Paternal Aunt      No Known Problems Paternal Uncle      No Known Problems Maternal Grandfather      No Known Problems Paternal Grandmother      No Known Problems Paternal Grandfather      Kidney failure Sister Rita     Hepatitis Sister Deepali     Cancer Sister Deepali         bone cancer     Immunodeficiency Sister Christiana     Diabetes Son x4     Hypertension Son x4     Lupus Neg Hx      Rheum arthritis Neg Hx      Amblyopia Neg Hx      Blindness Neg Hx      Macular degeneration Neg Hx      Retinal detachment Neg Hx      Strabismus Neg Hx      Stroke Neg Hx      Thyroid disease Neg Hx      Endometrial cancer Neg Hx      Vaginal cancer Neg Hx      Cervical cancer Neg Hx       Social History[1]  Review of Systems    Physical Exam     Initial Vitals [03/24/25 0120]   BP Pulse Resp Temp SpO2   100/70 76 20 97.9 °F (36.6 °C) 100 %      MAP       --         Physical Exam  Physical Exam:  CONSTITUTIONAL: Well developed, well nourished, lethargic  HENT: Normocephalic, atraumatic    EYES:  Sclerae anicteric.   NECK: Supple.   CARDIOVASCULAR: Regular rate and rhythm without any murmurs, gallops, rubs.  RESPIRATORY: Speaking in full sentences. Breathing comfortably. Auscultation of the lungs revealed normal breath sounds b/l, no wheezing, no rales, no rhonchi.  ABDOMEN: Soft and nontender, no masses, no rebound or guarding   NEUROLOGIC: Somnolent, but answering questions and following commands,   Normal sensation to light touch b/l upper and lower extremities distally. 4/5 strength bilateral upper and lower extremities.   SKIN: Warm and dry. No visible rash on exposed areas of skin.    Psych: Mood and affect normal.       ED Course   Procedures  Labs Reviewed   COMPREHENSIVE METABOLIC PANEL - Abnormal       Result Value    Sodium 124 (*)     Potassium 3.7      Chloride 89 (*)     CO2 23      Glucose 184 (*)     BUN 29 (*)     Creatinine 1.6 (*)     Calcium 8.4 (*)     Protein Total 6.4      Albumin 3.3 (*)     Bilirubin Total 0.3      ALP 62      AST 36      ALT 20      Anion Gap 12      eGFR 33 (*)    PROTIME-INR - Abnormal    PT 12.7 (*)     INR 1.2     LIPID PANEL - Abnormal    Cholesterol Total 107 (*)     Triglyceride 127      HDL Cholesterol 35 (*)     LDL Cholesterol 46.6 (*)     HDL/Cholesterol Ratio 32.7      Cholesterol/HDL Ratio 3.1      Non HDL Cholesterol 72     CBC WITH DIFFERENTIAL - Abnormal    WBC 4.95      RBC 2.55 (*)     HGB 8.6 (*)     HCT 25.2 (*)     MCV 99 (*)     MCH 33.7      MCHC 34.1      RDW 13.0      Platelet Count 256      MPV 8.9 (*)     Nucleated RBC 0      Neut % 59.7      Lymph % 22.8      Mono % 13.3      Eos % 2.8      Basophil % 0.8      Imm Grans % 0.6 (*)     Neut # 2.95      Lymph # 1.13      Mono # 0.66      Eos # 0.14      Baso # 0.04      Imm Grans # 0.03     ISTAT PROCEDURE - Abnormal    POC PTWBT 13.4      POC PTINR 1.3 (*)     Sample unknown     ISTAT PROCEDURE - Abnormal    POC PH 7.406      POC PCO2 44.1      POC PO2 32 (*)     POC HCO3 27.7      POC BE 3  (*)     POC SATURATED O2 62      POC Sodium 124 (*)     POC Potassium 3.4 (*)     POC TCO2 29      POC Ionized Calcium 1.15      POC Hematocrit 29 (*)     Sample VENOUS      Site Other      Allens Test N/A     ISTAT CREATININE - Abnormal    POC Creatinine 1.7 (*)     Sample unknown     ISTAT PROCEDURE - Abnormal    POC Glucose 193 (*)     POC BUN 31 (*)     POC Creatinine 1.8 (*)     POC Sodium 122 (*)     POC Potassium 3.9      POC Chloride 86 (*)     POC TCO2 (MEASURED) 26      POC Ionized Calcium 1.03 (*)     POC Hematocrit 27 (*)     Sample BRENNA     TSH - Normal    TSH 2.762     HIV 1 / 2 ANTIBODY - Normal    HIV 1/2 Ag/Ab Non-Reactive     TROPONIN I HIGH SENSITIVITY - Normal    Troponin High Sensitive <3     CBC W/ AUTO DIFFERENTIAL    Narrative:     The following orders were created for panel order CBC W/ AUTO DIFFERENTIAL.  Procedure                               Abnormality         Status                     ---------                               -----------         ------                     CBC with Differential[[id number]]       Abnormal            Final result                 Please view results for these tests on the individual orders.   POCT GLUCOSE, HAND-HELD DEVICE     EKG Readings: (Independently Interpreted)   Initial Reading: No STEMI. Rhythm: Normal Sinus Rhythm. Heart Rate: 88. Other Findings: Prolonged QT Interval.       Imaging Results              MRI Brain Without Contrast (Final result)  Result time 03/24/25 05:00:22      Final result by Yayo Luis MD (03/24/25 05:00:22)                   Impression:      No evidence of acute intracranial hemorrhage or major vascular distribution infarct.    Pattern of generalized cerebral volume loss and chronic small vessel ischemic changes.    Electronically signed by resident: Haris Gonzalez  Date:    03/24/2025  Time:    04:09    Electronically signed by: Yayo Lius  Date:    03/24/2025  Time:    05:00               Narrative:     EXAMINATION:  MRI BRAIN WITHOUT CONTRAST    CLINICAL HISTORY:  aphasia;    TECHNIQUE:  Multiplanar multisequence MR imaging of the brain was performed without intravenous contrast.    COMPARISON:  CTA stroke 03/24/2025, MRI brain 11/04/2024    FINDINGS:  Numerous sequences are significantly degraded by patient motion.    Intracranial Compartment:    Ventricles are prominent, unchanged in size with mild widening of cerebral sulci compatible with generalized volume loss.  No hydrocephalus.    Moderate patchy and confluent T2/FLAIR hyperintensity in the supratentorial white matter, nonspecific but most likely reflecting chronic small vessel ischemic changes.  Remote lacunar type infarcts in the bilateral basal ganglia.  No diffusion restriction to suggest acute infarct.  No mass, hemorrhage, mass effect, or midline shift.  Susceptibility artifact in the bilateral cerebellar hemispheres and right thalamus suggesting remote microhemorrhages.    No extra-axial blood or fluid collections.    Normal vascular flow voids are preserved.    Skull/Extracranial Contents (limited evaluation):    Bone marrow signal intensity is normal.    Pre-existing C2-C3 anterolisthesis with associated spinal stenosis.    The known C3 through C7 laminectomies and posterior instrumented fusion are partially visualized                                        CTA STROKE MULTI-PHASE (XPD) (Final result)  Result time 03/24/25 04:11:29      Final result by Yayo Luis MD (03/24/25 04:11:29)                   Impression:      No intracranial hemorrhage or major vascular distribution infarct.  Consider MRI brain if further evaluation is required.    No high-grade vascular stenosis or occlusion in the craniocervical cerebrovascular arterial circulation.    Suspected 3.5 x 2.6 x 3.9 mm aneurysm of the right A1 CHLOE or anterior communicating artery.  Correlate clinically and with appropriate follow-up.    Small air-fluid level within the upper thoracic  esophagus could be related to gastroesophageal reflux, alteration of esophageal motility, or perhaps a distal esophageal stricture (whether benign or malignant).  Recommendations include clinical correlation and follow-up outpatient EGD.    Note that this finding could potentially predispose to aspiration.    Chronic C2-C3 anterolisthesis, demonstrated previously, with consequent spinal stenosis.  Underlying spinal cord poorly visualized, limiting assessment for any associated spinal cord compression.  Correlate clinically.    Additional observations as detailed in the body of the report.    This report was flagged in Epic as abnormal.    Electronically signed by resident: Haris Gonzalez  Date:    03/24/2025  Time:    02:20    Electronically signed by: Yayo Luis  Date:    03/24/2025  Time:    04:11               Narrative:    EXAMINATION:  CTA STROKE MULTI-PHASE (XPD)    CLINICAL HISTORY:  Neuro deficit, acute, stroke suspected;    TECHNIQUE:  Axial CT images obtained throughout the region of the head before the administration of intravenous contrast.    CT angiogram was performed through the cervical and intracranial vasculature during the IV bolus administration of 75mL of Omnipaque 350.  Two additional phases through the intracranial vasculature via multiphase technique.    Multiplanar MPR and MIP reformats were performed.    CT source data was analyzed using artificial intelligence software for detection of a large vessel occlusions (LVO) in order to enable computer assisted triage notification and aid clinical stroke decision making.    COMPARISON:  MRA brain 02/26/2025    MRI brain 11/04/2025    CT head 03/11/2024    Cervical spine CT 03/11/2024    FINDINGS:  Unchanged prominence of the ventricles without evidence of hydrocephalus.    Moderate patchy hypoattenuation in the supratentorial white matter, nonspecific but most likely reflecting chronic microvascular ischemic changes.    Remote lacunar type  infarct of the right basal ganglia.    No major vascular distribution brain infarct.    No acute intracranial hemorrhage.    No intracranial mass effect or midline shift.    No extra-axial blood or fluid collections.    The cranium appears intact. Mastoid air cells and paranasal sinuses are essentially clear.      CTA:    The aortic arch demonstratesmild atherosclerosis with no significant stenosis at the major branch vessel origins.    The right common carotid artery is normal in caliber.  No significant stenosis at the carotid bifurcation.The right internal carotid artery is normal in caliber and mild atherosclerosis in the cavernous segment..    The left common carotid artery is normal in caliber. No significant stenosis at the carotid bifurcation.The left internal carotid artery demonstrates moderate atherosclerosis in the cavernous segment with mild luminal narrowing.  The left internal carotid artery is otherwise normal in caliber.    The right vertebral artery is normal in caliber.    The left vertebral artery is normal in caliber.    Basilar artery is within normal limits without focal abnormality.    The proximal anterior cerebral arteries appear patent.    A slightly irregularly shaped, approximately 3.5 x 2.6 x 3.9 mm outpouching of the right A1 CHLOE or anterior communicating artery is suspicious for an intracranial aneurysm (series 5, image 359).    Proximal middle and posterior cerebral arteries are within normal limits.  No evidence of significant stenosis or focal occlusion.    Additional CT findings: Intraluminal gas and fluid within the partially distended upper thoracic esophagus.    Visualized proximal airways are patent    Mosaic attenuation of the bilateral lung apices, possibly reflecting small airways disease, mosaic perfusion, or edema.  No consolidation, pneumothorax, or pleural fluid in the visualized upper chest..    No acute fracture deformity in the visualized skeleton.    Pre-existing  grade 1/grade 2 C2-C3 anterolisthesis (causing some spinal stenosis; the underlying spinal cord is poorly visualized on these CT images, limiting definitive assessment for any associated spinal cord compression), and C3-C7 laminectomies with bilateral posterior instrumented cervical fusion, all similar to comparison images.                                       Medications   iohexoL (OMNIPAQUE 350) injection 100 mL (75 mLs Intravenous Given 3/24/25 0125)   sodium chloride 0.9% bolus 1,000 mL 1,000 mL (0 mLs Intravenous Stopped 3/24/25 0521)     Medical Decision Making  79-year-old female with history and physical exam as noted above.  /70. Afebrile. 96% on RA. HR 82. Resp 15. Stroke code was activated for aphasia, facial droop, vision changes which we did not know were chronic. CTA and MRI brain without intracranial hemorrhage or major vascular distribution infarct. Pt's speech and alertness improved without intervention.     DDX dehydration, electrolyte abnormality, anemia, ACS, peripheral causes of vertigo. Troponin negative.   Admit for hyponatremia, anemia, new SCOTT. Na 124 (baseline 130). Cr 1.6 (baseline 1.2), Hb 8.6 (baseline 10).     Amount and/or Complexity of Data Reviewed  Labs: ordered. Decision-making details documented in ED Course.  Radiology: ordered.    Risk  Prescription drug management.  Decision regarding hospitalization.               ED Course as of 03/24/25 0643   Mon Mar 24, 2025   0318 Hemoglobin(!): 8.6  Baseline around 10. [MK]   0320 Sodium(!): 124  Baseline around 130. [MK]   0322 Creatinine(!): 1.6  Baseline 1.2. [MK]      ED Course User Index  [MK] Ashley Scherer MD                           Clinical Impression:  Final diagnoses:  [R29.818] Acute focal neurological deficit  [E87.1] Hyponatremia (Primary)  [N17.9] SCOTT (acute kidney injury)          ED Disposition Condition    Admit Stable                    [1]   Social History  Tobacco Use    Smoking status: Never     Passive  exposure: Never    Smokeless tobacco: Never   Substance Use Topics    Alcohol use: No    Drug use: No        Ashley Scherer MD  Resident  03/24/25 0707

## 2025-03-24 NOTE — ASSESSMENT & PLAN NOTE
Lab Results   Component Value Date    TSH 2.762 03/24/2025    [id number] 70 06/13/2022    FREET4 1.53 (H) 06/13/2022   - continue home synthroid

## 2025-03-24 NOTE — ASSESSMENT & PLAN NOTE
Stage 3a chronic kidney disease   Suspect etiology pre-renal.  Lab Results   Component Value Date    CREATININE 1.6 (H) 03/24/2025   - Baseline Cr 1.1-1.2  - No signs or symptoms of uremia  - UA pending   - Renal US if no improvement in 24-48 hrs   - hold ACE-I or ARB while renal function dynamic  - Monitor UOP and follow serial BMP   - Adjust drugs to GFR/CrCL; avoid nephrotoxic drugs   -  Estimated Creatinine Clearance: 26 mL/min (A) (based on SCr of 1.6 mg/dL (H)).   - Monitor electrolytes, phos levels daily

## 2025-03-24 NOTE — ED NOTES
Assumed care of the patient. Report received from DELFINO Mcqueen. Pt placed of cardiac monitor. Pt in hospital gown, side rails up X2, bed low and locked, and call light is placed within reach. Daughter at bedside at this time. Pt denies any complaints or needs.

## 2025-03-24 NOTE — ASSESSMENT & PLAN NOTE
Hyponatremia is likely due to Dehydration/hypovolemia. The patient's most recent sodium results are listed below.  Recent Labs     03/24/25  0149   *     Plan  - Correct the sodium by 4-6mEq in 24 hours.   - Obtain the following studies: Urine sodium, urine osmolality, serum osmolality or TSH, T4.  - Will treat the hyponatremia with IV fluids as follows: received 1L NS in the ED   - Monitor sodium Daily.   - Patient hyponatremia is stable

## 2025-03-24 NOTE — CONSULTS
[facility] - Emergency Dept  Vascular Neurology  [facility]  Consult Note    Inpatient consult to Neurology Services (Vascular Neurology)  Consult performed by: Ruma Holbrook DNP  Consult ordered by: Ashley Scherer MD  Reason for consult: Slurres speech and dizziness        Assessment/Plan:     Patient is a 79 y.o. year old female with:    Dizziness  79 y.o. female with PMHx of HTN, HLD, T2DM, pulmonary embolism (on eliquis, last dose last night), CKD, hypothyroidism, GERD who [place] the [facility] [place] via EMS for slurred speech and dizziness. Patient reports she was watching tv with her daughter and she got up to go to the kitchen and suddenly felt dizzy with slurred speech. She felt her tongue was big in her mouth and she told her daughter she feels she is having a stroke. Daughter called EMS. She denies any syncopal episodes. Denies any other symptoms. LKN 0030. Stroke code activated on arrival to the ED.     -;   -NIHSS 6 for lethargy, mild dysarthria and generalized weakness (drifts in all extremities; uses a walker at home). Endorse dizziness with head movement.    -CTA Stroke MP with no evidence for major territorial infarction or acute intracranial hemorrhage; no large vessel occlusion.  -Patient not a candidate for acute intervention at this time. No TNK due to eliquis use and non-disabling symptoms. No IR as no acute LVO on CTA and symptoms not concerning for LVO at this time.  -Recommend workup for peripheral etiology, hallpike, etc due to dizziness with head movement.    -Can obtain MRI Brain WO to definitely rule out acute stroke if all other workup negative.   -Allow for permissive hypertension; SBP<220/110 until acute stroke ruled out  -Case discussed with on-call fellow.     Thank you for your consult. Vascular neurology to follow up on imaging if obtained. Please do not hesitate to contact us at [phone] for any questions or concerns.          STROKE DOCUMENTATION      Acute Stroke Times   Last Known Normal Date: 03/24/25  Last Known Normal Time: 0030  Stroke Team Called Date: 03/24/25  Stroke Team Called Time: 0122  Stroke Team Arrival Date: 03/24/25  Stroke Team Arrival Time: 0126  CT Interpretation Time: 0126  Thrombolytic Therapy Recommended: No  CTA Interpretation Time: 0128  Thrombectomy Recommended: No    NIH Scale:  Interval: baseline  1a. Level of Consciousness: 1-->Not alert, but arousable by minor stimulation to obey, answer, or respond  1b. LOC Questions: 0-->Answers both questions correctly  1c. LOC Commands: 0-->Performs both tasks correctly  2. Best Gaze: 0-->Normal  3. Visual: 0-->No visual loss  4. Facial Palsy: 0-->Normal symmetrical movements  5a. Motor Arm, Left: 1-->Drift, limb holds 90 (or 45) degrees, but drifts down before full 10 seconds, does not hit bed or other support  5b. Motor Arm, Right: 1-->Drift, limb holds 90 (or 45) degrees, but drifts down before full 10 secs, does not hit bed or other support  6a. Motor Leg, Left: 1-->Drift, leg falls by the end of the 5-sec period but does not hit bed  6b. Motor Leg, Right: 1-->Drift, leg falls by the end of the 5-sec period but does not hit bed  7. Limb Ataxia: 0-->Absent  8. Sensory: 0-->Normal, no sensory loss  9. Best Language: 0-->No aphasia, normal  10. Dysarthria: 1-->Mild-to-moderate dysarthria, patient slurs at least some words and, at worst, can be understood with some difficulty  11. Extinction and Inattention (formerly Neglect): 0-->No abnormality  Total (NIH Stroke Scale): 6    Modified Allen Score: 3  Barb Coma Scale:15   ABCD2 Score:    [id number] Score:   HAS -BLED Score:   ICH Score:   Hunt & Cronin Classification:       Thrombolysis Candidate? No, Current use of direct thrombin inhibitors (dabigatran) or direct factor Xa inhibitors within 48 hours    Delays to Thrombolysis?  Not Applicable    Interventional Revascularization Candidate?   Is the patient eligible for mechanical  endovascular reperfusion (HAIDER)?  No; No large vessel occlusion identified on imaging     Delays to Thrombectomy? Not Applicable    Hemorrhagic change of an Ischemic Stroke: Does this patient have an ischemic stroke with hemorrhagic changes? No     Subjective:     History of Present Illness:  79 y.o. female with PMHx of HTN, HLD, T2DM, pulmonary embolism (on eliquis), CKD, hypothyroidism, GERD [place] [facility] [place] for slurred speech and dizziness. Patient reports she was watching tv with her daughter and she got up to go to the kitchen and suddenly felt dizzy with slurred speech. She felt her tongue was big in her mouth and she told her daughter she feels she is having a stroke. Daughter called EMS. She denies any syncopal episodes. Denies any other symptoms. Stroke code activated on arrival to the ED.           Past Medical History:   Diagnosis Date    Acid reflux     Allergy     Alopecia     Anemia     Anemia in CKD (chronic kidney disease) 09/22/2016    Anxiety     Arthritis     Back pain     Cataract     Chronic kidney disease     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, without long-term current use of insulin     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, without long-term current use of insulin     Depression     Diabetes mellitus, type 2     Eye injury as a child     k-abrasion  od    Hyperlipidemia     Hypertension     Hypothyroidism     Immune deficiency disorder     Immune disorder     LOC (loss of consciousness) 03/12/2021    at home    Myalgia and myositis 09/06/2012    Osteoporosis     Polyneuropathy     Pulmonary embolism 07/10/2021    Renal manifestation of secondary diabetes mellitus     Sarcoidosis     Seizure     Type 2 diabetes mellitus     Ulcer     no cancer    Urinary incontinence      Past Surgical History:   Procedure Laterality Date    CARPAL TUNNEL RELEASE      Rt wrist     CATARACT EXTRACTION W/  INTRAOCULAR LENS IMPLANT Right 2015    Dr. Azevedo    CATARACT EXTRACTION W/  INTRAOCULAR LENS IMPLANT Left 2015    Dr. Azevedo    CERVICAL SPINE SURGERY       SECTION      CHOLECYSTECTOMY      INJECTION OF ANESTHETIC AGENT AROUND NERVE Left 2020    Procedure: BLOCK, NERVE LEFT FEMORAL AND OBTURATOR;  Surgeon: Alfonso Richards MD;  Location: [facility];  Service: Pain Management;  Laterality: Left;  NEEDS CONSENT    INJECTION OF ANESTHETIC AGENT AROUND NERVE Bilateral 10/09/2023    Procedure: BLOCK, NERVE, BILATERAL L3-L4-L5 MEDIAL BRANCH;  Surgeon: Alfonso Richards MD;  Location: [facility];  Service: Pain Management;  Laterality: Bilateral;    INJECTION OF JOINT Left 2019    Procedure: Injection, Joint  fLUOROSCOPIC jOINT iNJECTION (hIP iNJECTION) LEFT ROCH BURSA AS WELL LEFT TROCHANTERIC BURSA;  Surgeon: Alfonso Richards MD;  Location: [facility];  Service: Pain Management;  Laterality: Left;  NEEDS CONSENT, DIABETIC    INJECTION OF JOINT Left 2019    Procedure: Injection, Joint FLUOROSCOPIC JOINT INJECTION (HIP INJECTION) LEFT HIP;  Surgeon: Alfonso Richards MD;  Location: [facility];  Service: Pain Management;  Laterality: Left;  NEEDS CONSENT    INJECTION OF JOINT Left 2019    Procedure: INJECTION, JOINT;  Surgeon: Alfonso Richards MD;  Location: [facility];  Service: Pain Management;  Laterality: Left;  Left Hip and Left GTB Injections    INJECTION OF JOINT Left 2020    Procedure: INJECTION, JOINT, LEFT HIP and LEFT GREATER TROCHANTERIC BURSA;  Surgeon: Alfonso Richards MD;  Location: [facility];  Service: Pain Management;  Laterality: Left;  INJECTION, JOINT, LEFT HIP and LEFT GREATER TROCHANTERIC BURSA    INJECTION OF JOINT Left 2020    Procedure: INJECTION, JOINT, LEFT SI;  Surgeon: Alfonso Richards MD;  Location: [facility];  Service: Pain Management;  Laterality: Left;  INJECTION, JOINT, LEFT SI    TRANSFORAMINAL EPIDURAL  INJECTION OF STEROID Bilateral 12/05/2019    Procedure: INJECTION, STEROID, EPIDURAL, TRANSFORAMINAL APPROACH;  Surgeon: Alfonso Richards MD;  Location: [facility];  Service: Pain Management;  Laterality: Bilateral;  B/L TF RHIANNA L5  Consent Needed    TRANSFORAMINAL EPIDURAL INJECTION OF STEROID Bilateral 06/29/2020    Procedure: INJECTION, STEROID, EPIDURAL, TRANSFORAMINAL APPROACH L5/S1;  Surgeon: Alfonso Richards MD;  Location: [facility];  Service: Pain Management;  Laterality: Bilateral;  B/L TF RHIANNA L5/S1    TUBAL LIGATION       Social History[1]  Review of patient's allergies indicates:   Allergen Reactions    Azathioprine Shortness Of Breath and Other (See Comments)     Fatigue       Medications: I have reviewed the current medication administration record.    Prescriptions Prior to Admission[2]    Review of Systems   Neurological:  Positive for dizziness and speech difficulty.   All other systems reviewed and are negative.    Objective:     Vital Signs (Most Recent):  Temp: 97.9 °F (36.6 °C) (03/24/25 0120)  Pulse: 77 (03/24/25 0220)  Resp: 14 (03/24/25 0220)  BP: (!) 108/57 (03/24/25 0220)  SpO2: 95 % (03/24/25 0220)    Vital Signs Range (Last 24H):  Temp:  [97.9 °F (36.6 °C)]   Pulse:  [76-87]   Resp:  [14-20]   BP: (100-119)/(55-70)   SpO2:  [95 %-100 %]        Physical Exam  Vitals and nursing note reviewed.   Constitutional:       General: She is not in acute distress.  HENT:      Head: Normocephalic and atraumatic.      Mouth/Throat:      Mouth: Mucous membranes are moist.   Eyes:      Extraocular Movements: Extraocular movements intact.      Pupils: Pupils are equal, round, and reactive to light.   Cardiovascular:      Rate and Rhythm: Normal rate.   Pulmonary:      Effort: Pulmonary effort is normal. No respiratory distress.   Abdominal:      Tenderness: There is no guarding.   Skin:     General: Skin is warm and dry.   Neurological:      Mental Status: She is alert and oriented to person, place, and  "time.      Cranial Nerves: No cranial nerve deficit.      Sensory: No sensory deficit.   Psychiatric:         Mood and Affect: Mood normal.         Behavior: Behavior normal.              Neurological Exam:   LOC: drowsy  Attention Span: Good   Language: No aphasia  Articulation: Dysarthria  Orientation: Person, Place, Time   Visual Fields: Full  EOM (CN III, IV, VI): Full/intact  Pupils (CN II, III): PERRL  Facial Sensation (CN V): Normal  Facial Movement (CN VII): Symmetric facial expression    Motor: Arm left  Paresis: 4/5  Leg left  Paresis: 4/5  Arm right  Paresis: 4/5  Leg right Paresis: 4/5  Sensation: Intact to light touch, temperature and vibration      Laboratory:  CMP:   Recent Labs   Lab 03/24/25  0149   GLUCOSE 184*   CALCIUM 8.4*   ALBUMIN 3.3*   *   K 3.7   CO2 23   CL 89*   BUN 29*   CREATININE 1.6*   ALKPHOS 62   ALT 20   AST 36   BILITOT 0.3     CBC:   Recent Labs   Lab 03/24/25  0149   WBC 4.95   RBC 2.55*   HGB 8.6*   HCT 25.2*      MCV 99*   MCH 33.7   MCHC 34.1     Lipid Panel:   Recent Labs   Lab 03/24/25  0149   CHOL 107*   HDL 35*   TRIG 127     Coagulation:   Recent Labs   Lab 03/24/25  0149   INR 1.2     Hgb A1C: No results for input(s): "HGBA1C" in the last 168 hours.  TSH:   Recent Labs   Lab 03/24/25  0149   TSH 2.762       Diagnostic Results:      Brain imaging/Vessel Imaging:    CTA Stroke  - 3/24/2025    Official radiology read unavailable. Imaging reviewed by stroke team. No acute intracranial abnormalities, no large vessel occlusion.     Cardiac Evaluation:     EKG from today; NSR, vet rate of 88 bpm      Ruma Holbrook DNP  [facility]  Department of Vascular Neurology   [facility] - Emergency Dept        [1]   Social History  Tobacco Use    Smoking status: Never     Passive exposure: Never    Smokeless tobacco: Never   Substance Use Topics    Alcohol use: No    Drug use: No   [2] (Not in a hospital admission)    "

## 2025-03-24 NOTE — ASSESSMENT & PLAN NOTE
Long term (current) use of anticoagulants   - unprovoked, on AC indefinitely   - continue home eliquis 5 mg BID

## 2025-03-24 NOTE — HPI
79 y.o. female with PMHx of HTN, HLD, T2DM, pulmonary embolism (on eliquis), CKD, hypothyroidism, GERD who [place] [facility] [place] slurred speech and dizziness. Patient reports she was watching tv with her daughter and she got up to go to the kitchen and suddenly felt dizzy with slurred speech. She felt her tongue was big in her mouth and she told her daughter she feels she is having a stroke. Daughter called EMS. She denies any syncopal episodes. Denies any other symptoms. Stroke code activated on arrival to the ED.

## 2025-03-24 NOTE — ASSESSMENT & PLAN NOTE
Dizziness   Unclear etiology; TIA vs epilepsy related vs hypotension?  - per chart review she had similar presentation and admission in 2019 associated with dehydration. Stroke workup negative at that time as well. Seen by neurology at that time, diagnosed with possible TIA and discharged home with secondary stroke prevention  - stroke code in ED  - CTA and MRI brain negative for acute strokes   - keppra level pending   - neuro checks q4h

## 2025-03-24 NOTE — ED TRIAGE NOTES
Regino Lawrence, a 79 y.o. female presents to the ED w/ complaint of slurred speech. Pt reports she was in her kitchen with her daughter when she began experiencing dizziness and slurred speech. Pt reports she felt like she did before her previous stroke. Pt states that she did not take her eliquis tonight because she hasn't been feeling well, however she took it last night. Pt denies changes in vision, new numbness or tingling, or weakness in the extremities. Pt endorses generalized weakness, lethargy.    Triage note:  Chief Complaint   Patient presents with    Cerebrovascular Accident     Arrives via EMS w/ c/o slurred speech, r-sided drooping, and visual field deficit. LKN 0030 today.      Review of patient's allergies indicates:   Allergen Reactions    Azathioprine Shortness Of Breath and Other (See Comments)     Fatigue     Past Medical History:   Diagnosis Date    Acid reflux     Allergy     Alopecia     Anemia     Anemia in CKD (chronic kidney disease) 09/22/2016    Anxiety     Arthritis     Back pain     Cataract     Chronic kidney disease     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, without long-term current use of insulin     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, without long-term current use of insulin     Depression     Diabetes mellitus, type 2     Eye injury as a child     k-abrasion  od    Hyperlipidemia     Hypertension     Hypothyroidism     Immune deficiency disorder     Immune disorder     LOC (loss of consciousness) 03/12/2021    at home    Myalgia and myositis 09/06/2012    Osteoporosis     Polyneuropathy     Pulmonary embolism 07/10/2021    Renal manifestation of secondary diabetes mellitus     Sarcoidosis     Seizure     Type 2 diabetes mellitus     Ulcer     no cancer    Urinary incontinence

## 2025-03-24 NOTE — ASSESSMENT & PLAN NOTE
"She is unsure what medications she takes, last insulin glargine rx fill was in 11/2024  - Last A1c reviewed-   Lab Results   Component Value Date    HGBA1C 5.2 01/25/2025     - Most recent fingerstick glucose reviewed- No results for input(s): "POCTGLUCOSE" in the last 24 hours.  - Current correctional scale Low  Maintain anti-hyperglycemic dose as follows-   Antihyperglycemics (From admission, onward)      Start     Stop Route Frequency Ordered    03/24/25 0911  insulin aspart U-100 pen 0-5 Units         -- SubQ Before meals & nightly PRN 03/24/25 0814          "

## 2025-03-24 NOTE — ED NOTES
Telemetry Verification   Patient placed on Telemetry Box  Verified with War Room  Box # [id number]   Monitor Tech War room   Rate 104   Rhythm Sinus tach

## 2025-03-24 NOTE — ASSESSMENT & PLAN NOTE
Patient's most recent potassium results are listed below.   Recent Labs     03/24/25  0149 03/24/25  0422   K 3.7 3.1*     Plan  - Replete potassium per protocol  - Monitor potassium Daily  - Patient's hypokalemia is stable

## 2025-03-25 ENCOUNTER — TELEPHONE (OUTPATIENT)
Dept: FAMILY MEDICINE | Facility: CLINIC | Age: 80
End: 2025-03-25
Payer: MEDICARE

## 2025-03-25 VITALS
DIASTOLIC BLOOD PRESSURE: 78 MMHG | BODY MASS INDEX: 28.16 KG/M2 | WEIGHT: 153 LBS | HEIGHT: 62 IN | OXYGEN SATURATION: 95 % | TEMPERATURE: 98 F | SYSTOLIC BLOOD PRESSURE: 138 MMHG | HEART RATE: 100 BPM | RESPIRATION RATE: 17 BRPM

## 2025-03-25 LAB
ABSOLUTE EOSINOPHIL (OHS): 0.11 K/UL
ABSOLUTE MONOCYTE (OHS): 0.58 K/UL (ref 0.3–1)
ABSOLUTE NEUTROPHIL COUNT (OHS): 1.84 K/UL (ref 1.8–7.7)
ANION GAP (OHS): 11 MMOL/L (ref 8–16)
BASOPHILS # BLD AUTO: 0.02 K/UL
BASOPHILS NFR BLD AUTO: 0.5 %
BUN SERPL-MCNC: 22 MG/DL (ref 8–23)
CA-I BLD-SCNC: 1.02 MMOL/L (ref 1.06–1.42)
CALCIUM SERPL-MCNC: 9.4 MG/DL (ref 8.7–10.5)
CHLORIDE SERPL-SCNC: 97 MMOL/L (ref 95–110)
CO2 SERPL-SCNC: 21 MMOL/L (ref 23–29)
CREAT SERPL-MCNC: 1.3 MG/DL (ref 0.5–1.4)
ERYTHROCYTE [DISTWIDTH] IN BLOOD BY AUTOMATED COUNT: 13.5 % (ref 11.5–14.5)
GFR SERPLBLD CREATININE-BSD FMLA CKD-EPI: 42 ML/MIN/1.73/M2
GLUCOSE SERPL-MCNC: 89 MG/DL (ref 70–110)
HCT VFR BLD AUTO: 34.4 % (ref 37–48.5)
HGB BLD-MCNC: 11.2 GM/DL (ref 12–16)
IMM GRANULOCYTES # BLD AUTO: 0.02 K/UL (ref 0–0.04)
IMM GRANULOCYTES NFR BLD AUTO: 0.5 % (ref 0–0.5)
LYMPHOCYTES # BLD AUTO: 1.2 K/UL (ref 1–4.8)
MAGNESIUM SERPL-MCNC: 1.2 MG/DL (ref 1.6–2.6)
MCH RBC QN AUTO: 33.3 PG (ref 27–50)
MCHC RBC AUTO-ENTMCNC: 32.6 G/DL (ref 32–36)
MCV RBC AUTO: 102 FL (ref 82–98)
NUCLEATED RBC (/100WBC) (OHS): 0 /100 WBC
PHOSPHATE SERPL-MCNC: 3.5 MG/DL (ref 2.7–4.5)
PLATELET # BLD AUTO: 362 K/UL (ref 150–450)
PMV BLD AUTO: 8.9 FL (ref 9.2–12.9)
POCT GLUCOSE: 96 MG/DL (ref 70–110)
POTASSIUM SERPL-SCNC: 3.9 MMOL/L (ref 3.5–5.1)
RBC # BLD AUTO: 3.36 M/UL (ref 4–5.4)
RELATIVE EOSINOPHIL (OHS): 2.9 %
RELATIVE LYMPHOCYTE (OHS): 31.8 % (ref 18–48)
RELATIVE MONOCYTE (OHS): 15.4 % (ref 4–15)
RELATIVE NEUTROPHIL (OHS): 48.9 % (ref 38–73)
SODIUM SERPL-SCNC: 129 MMOL/L (ref 136–145)
WBC # BLD AUTO: 3.77 K/UL (ref 3.9–12.7)

## 2025-03-25 PROCEDURE — 63600175 PHARM REV CODE 636 W HCPCS: Mod: HCNC

## 2025-03-25 PROCEDURE — 25000003 PHARM REV CODE 250: Performed by: INTERNAL MEDICINE

## 2025-03-25 PROCEDURE — G0378 HOSPITAL OBSERVATION PER HR: HCPCS | Mod: HCNC

## 2025-03-25 PROCEDURE — 96366 THER/PROPH/DIAG IV INF ADDON: CPT

## 2025-03-25 PROCEDURE — 84100 ASSAY OF PHOSPHORUS: CPT | Mod: HCNC

## 2025-03-25 PROCEDURE — 63600175 PHARM REV CODE 636 W HCPCS: Mod: HCNC | Performed by: INTERNAL MEDICINE

## 2025-03-25 PROCEDURE — 25000003 PHARM REV CODE 250: Mod: HCNC

## 2025-03-25 PROCEDURE — 83735 ASSAY OF MAGNESIUM: CPT | Mod: HCNC

## 2025-03-25 PROCEDURE — 85025 COMPLETE CBC W/AUTO DIFF WBC: CPT | Mod: HCNC

## 2025-03-25 PROCEDURE — 36415 COLL VENOUS BLD VENIPUNCTURE: CPT | Mod: HCNC

## 2025-03-25 PROCEDURE — 96365 THER/PROPH/DIAG IV INF INIT: CPT

## 2025-03-25 PROCEDURE — 82330 ASSAY OF CALCIUM: CPT | Mod: HCNC

## 2025-03-25 PROCEDURE — 80048 BASIC METABOLIC PNL TOTAL CA: CPT | Mod: HCNC

## 2025-03-25 RX ORDER — FENOFIBRATE 48 MG/1
48 TABLET, FILM COATED ORAL DAILY
Status: CANCELLED | OUTPATIENT
Start: 2025-03-25

## 2025-03-25 RX ORDER — MAGNESIUM SULFATE HEPTAHYDRATE 40 MG/ML
2 INJECTION, SOLUTION INTRAVENOUS ONCE
Status: COMPLETED | OUTPATIENT
Start: 2025-03-25 | End: 2025-03-25

## 2025-03-25 RX ADMIN — MAGNESIUM SULFATE HEPTAHYDRATE 2 G: 40 INJECTION, SOLUTION INTRAVENOUS at 09:03

## 2025-03-25 RX ADMIN — LEVOTHYROXINE SODIUM 50 MCG: 0.05 TABLET ORAL at 05:03

## 2025-03-25 RX ADMIN — ATORVASTATIN CALCIUM 20 MG: 20 TABLET, FILM COATED ORAL at 09:03

## 2025-03-25 RX ADMIN — APIXABAN 5 MG: 5 TABLET, FILM COATED ORAL at 09:03

## 2025-03-25 RX ADMIN — PANTOPRAZOLE SODIUM 40 MG: 40 TABLET, DELAYED RELEASE ORAL at 09:03

## 2025-03-25 RX ADMIN — FOLIC ACID 1000 MCG: 1 TABLET ORAL at 09:03

## 2025-03-25 RX ADMIN — PREGABALIN 75 MG: 75 CAPSULE ORAL at 09:03

## 2025-03-25 RX ADMIN — MEMANTINE HYDROCHLORIDE 5 MG: 5 TABLET ORAL at 09:03

## 2025-03-25 RX ADMIN — CETIRIZINE HYDROCHLORIDE 10 MG: 10 TABLET, FILM COATED ORAL at 09:03

## 2025-03-25 RX ADMIN — PREDNISONE 3 MG: 1 TABLET ORAL at 09:03

## 2025-03-25 RX ADMIN — LEVETIRACETAM 1000 MG: 500 TABLET, FILM COATED ORAL at 09:03

## 2025-03-25 RX ADMIN — POLYETHYLENE GLYCOL 3350 17 G: 17 POWDER, FOR SOLUTION ORAL at 09:03

## 2025-03-25 NOTE — TELEPHONE ENCOUNTER
Contact  Regino per [facility] appointment and scheduled for March 29, 2025 with Micaela Mendoza MD.

## 2025-03-25 NOTE — PLAN OF CARE
[facility] - Internal Medicine Telemetry  Discharge Final Note    Primary Care Provider: Micaela Mendoza MD    Expected Discharge Date: 3/25/2025    Patient discharged to home via personal transportation.     Discharge Plan A and Plan B have been determined by review of patient's clinical status, future medical and therapeutic needs, and coverage/benefits for post-acute care in coordination with multidisciplinary team members.        Final Discharge Note (most recent)       Final Note - 03/25/25 1415          Final Note    Assessment Type Final Discharge Note (P)      Anticipated Discharge Disposition Home or Self Care (P)      What phone number can be called within the next 1-3 days to see how you are doing after discharge? [phone] (P)      Hospital Resources/Appts/Education Provided Provided education on problems/symptoms using teachback (P)         Post-Acute Status    Post-Acute Authorization Other (P)      Other Status Awaiting f/u Appts (P)                      Important Message from Medicare                 Future Appointments   Date Time Provider Department Center   3/26/2025  2:40 PM Edith Pacheco MD [facility] HEM ONC [facility] [place]   4/4/2025  7:00 AM LAB, [facility] [place]   4[facility]4/2025 12:00 PM INJECTION, [facility] [facility] [place]   4[facility]17/2025  7[facility]   4/19/2025 12:00 PM INJECTION, [facility] INF[facility]   5/2/2025  7:05 [facility]   5/2/2025 12:00 PM INJECTION, [facility]   5/19/2025  1:40 PM Kelli Bolivar PA-C [facility] GASTRO [facility] [place] [facility]   6/4/2025 10:40 AM [place] [facility] [place] [place] [facility]                        MARVIN Ventura, LMSW  [facility]  Case Management  Ext. [phone]

## 2025-03-25 NOTE — PLAN OF CARE
Problem: Adult Inpatient Plan of Care  Goal: Plan of Care Review  Outcome: Progressing  Flowsheets (Taken 3/25/2025 0029)  Plan of Care Reviewed With: patient  Goal: Patient-Specific Goal (Individualized)  Outcome: Progressing  Goal: Absence of Hospital-Acquired Illness or Injury  Outcome: Progressing  Intervention: Prevent Skin Injury  Flowsheets (Taken 3/25/2025 0029)  Skin Protection: incontinence pads utilized  Device Skin Pressure Protection: absorbent pad utilized/changed  Intervention: Prevent and Manage VTE (Venous Thromboembolism) Risk  Flowsheets (Taken 3/25/2025 0029)  VTE Prevention/Management:   bleeding precautions maintained   bleeding risk assessed  Intervention: Prevent Infection  Flowsheets (Taken 3/25/2025 0029)  Infection Prevention: hand hygiene promoted  Goal: Optimal Comfort and Wellbeing  Outcome: Progressing  Intervention: Monitor Pain and Promote Comfort  Flowsheets (Taken 3/25/2025 0029)  Pain Management Interventions:   care clustered   pillow support provided   quiet environment facilitated  Goal: Readiness for Transition of Care  Outcome: Progressing     Problem: Diabetes Comorbidity  Goal: Blood Glucose Level Within Targeted Range  Outcome: Progressing     Problem: Acute Kidney Injury/Impairment  Goal: Fluid and Electrolyte Balance  Outcome: Progressing  Intervention: Monitor and Manage Fluid and Electrolyte Balance  Flowsheets (Taken 3/25/2025 0029)  Fluid/Electrolyte Management: fluids provided  Goal: Improved Oral Intake  Outcome: Progressing  Intervention: Promote and Optimize Oral Intake  Flowsheets (Taken 3/25/2025 0029)  Nutrition Interventions: meal set-up provided  Goal: Effective Renal Function  Outcome: Progressing     Problem: Fall Injury Risk  Goal: Absence of Fall and Fall-Related Injury  Outcome: Progressing     Problem: Skin Injury Risk Increased  Goal: Skin Health and Integrity  Outcome: Progressing  Intervention: Optimize Skin Protection  Flowsheets (Taken 3/25/2025  0029)  Skin Protection: incontinence pads utilized  Intervention: Promote and Optimize Oral Intake  Flowsheets (Taken 3/25/2025 0029)  Nutrition Interventions: meal set-up provided

## 2025-03-25 NOTE — PROGRESS NOTES
AVS virtually reviewed with patient and family in its entirety with emphasis on diet, medications, follow-up appointments and reasons to return to the ED or contact the [facility] On Call Nurse Care Line. Patient also encouraged to utilize their patient portal. Ease and convenience of use reiterated. Education complete and patient voiced understanding. All questions answered. Discharge teaching complete.   
0 = understands/communicates without difficulty

## 2025-03-25 NOTE — PLAN OF CARE
Problem: Adult Inpatient Plan of Care  Goal: Plan of Care Review  Outcome: Adequate for Care Transition  Goal: Patient-Specific Goal (Individualized)  Outcome: Adequate for Care Transition  Goal: Absence of Hospital-Acquired Illness or Injury  Outcome: Adequate for Care Transition  Goal: Optimal Comfort and Wellbeing  Outcome: Adequate for Care Transition  Goal: Readiness for Transition of Care  Outcome: Adequate for Care Transition     Problem: Infection  Goal: Absence of Infection Signs and Symptoms  Outcome: Adequate for Care Transition     Problem: Diabetes Comorbidity  Goal: Blood Glucose Level Within Targeted Range  Outcome: Adequate for Care Transition     Problem: Acute Kidney Injury/Impairment  Goal: Fluid and Electrolyte Balance  Outcome: Adequate for Care Transition  Goal: Improved Oral Intake  Outcome: Adequate for Care Transition  Goal: Effective Renal Function  Outcome: Adequate for Care Transition     Problem: Fall Injury Risk  Goal: Absence of Fall and Fall-Related Injury  Outcome: Adequate for Care Transition     Problem: Skin Injury Risk Increased  Goal: Skin Health and Integrity  Outcome: Adequate for Care Transition

## 2025-03-25 NOTE — DISCHARGE INSTRUCTIONS
Our goal at [facility] is to always give you outstanding care and exceptional service. You may receive a survey from [employer] by mail, text or e-mail in the next 24-48 hours asking about the care you received with us. The survey should only take 5-10 minutes to complete and is very important to us.     Your feedback provides us with a way to recognize our staff who work tirelessly to provide the best care! Also, your responses help us learn how to improve when your experience was below our aspiration of excellence. We are always looking for ways to improve your stay. We WILL use your feedback to continue making improvements to help us provide the highest quality care. We keep your personal information and feedback confidential. We appreciate your time completing this survey and can't wait to hear from you!!!    We look forward to your continued care with us! Thanks so much for choosing [facility] for your healthcare needs!

## 2025-03-25 NOTE — NURSING
Pt arrived to unit via stretcher in no acute distress and with personal belongings. Pt AAO x 4, VS stable. Pt with no complaints or concerns at this time. Pt oriented to room and call light use. Side rails up x 3, bed locked and low, call light within reach. POC ongoing.

## 2025-03-25 NOTE — DISCHARGE SUMMARY
Jacky Aguilar - [facility]  [facility] Medicine  Discharge Summary      Patient Name: Regino Lawrence  MRN: [id number]  Admission Date: 3/24/2025  Hospital Length of Stay: 1 days  Discharge Date and Time:  03/25/2025 12:37 PM  Attending Physician: Juan Luis Loyola MD   Discharging Provider: Juan Luis Loyola MD  Discharge Provider Team: [facility]OSP MED A  Primary Care Provider: Micaela Mendoza MD        HPI: Regino Lawrence is a 79 y.o. female with CKD 3a, AICD, epilepsy, sarcoid on chronic steroids, hx of unprovoked PE on eliquis indefinitely, insuline dependent T2DM, hypertension, hyperlipidemia, GERD and SARI being admitted to hospital medicine for management of transient neurologic symptoms, SCOTT and hyponatremia. Patient presents with her daughter at bedside who helps with history. Report that she was watching TV last night and upon standing up suddenly had left facial droop, slurred speech, dizziness and generalized body weakness lasting 30 minutes - 1 hour. Reports the symptoms have since resolved and that she is back to baseline at the time of my evaluation. Denies any recent illness, fever, chills, N/V/D, abdominal pain, dysuria, hematuria, chest pain or shortness of breath. States she drinks plenty of water at home but does endorse poor appetite.            In ED: AFVSS. CBC without leukocytosis, hgb 8.6 which is near baseline. CMP shows SCOTT on CKD, creatinine 1.6 (baseline 1.1-1.2). TSH 2.762. troponin wnl. Stroke code activated in the ED. CTA stroke and MRI brain negative for acute intracranial hemorrhage or major vascular distribution infarct. [facility] neuro consulted, no acute intervention indicated. Given 1 L NS bolus in the ED. Admitted to hospital medicine for further management.     * No surgery found *      Hospital Course: The patient had rapid improvement in mental status following interventions in ED. Na initially at 123 and improved to 129 at discharge. Cr improved back to baseline at 1.3. Mental  status near baseline and pt comfortable discharging home.     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 Will need follow up.       Consults:   Consults (From admission, onward)          Status Ordering Provider     Inpatient consult to Neurology Services (Vascular Neurology)  Once        Provider:  (Not yet assigned)    Completed VESNA BISHOP            Final Active Diagnoses:    Diagnosis Date Noted POA    PRINCIPAL PROBLEM:  Acute renal failure superimposed on stage 3a chronic kidney disease [N17.9, N18.31] 02/24/2021 Yes    Dizziness [R42] 03/24/2025 Yes    Nonintractable epilepsy without status epilepticus [G40.909] 03/24/2025 Yes    Hyponatremia [E87.1] 03/24/2025 Yes    Episode of transient neurologic symptoms [R29.90] 03/24/2025 Yes    History of pulmonary embolus (PE) [Z86.711] 03/24/2025 Yes    Immunosuppression due to chronic steroid use [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.821, T38.0X5A, Z79.52] 01/22/2024 Not Applicable    Stage 3a chronic kidney disease [N18.31] 04/03/2023 Yes    Long term (current) use of anticoagulants [Z79.01] 06/23/2022 Not Applicable    Hypocalcemia [E83.51] 02/24/2021 Yes    Anemia in stage 3a chronic kidney disease [N18.31, D63.1]  Yes    Antalgic gait [R26.89] 09/26/2018 Yes    Debility [R53.81] 11/20/2015 Yes     Chronic    GERD (gastroesophageal reflux disease) [K21.9] 11/11/2015 Yes    Cervical spinal stenosis [M48.02] 10/16/2015 Yes    Sarcoidosis [D86.9] 12/17/2014 Yes     Chronic    Hypokalemia [E87.6] 10/24/2013 Yes    Combined 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 [E11.69, E78.2] 07/25/2013 Yes     Chronic    Hypertension associated with stage 3a chronic kidney disease due to type 2 diabetes mellitus [E11.22, I12.9, N18.31] 07/25/2013 Yes    Hypothyroidism [E03.9] 01/07/2013 Yes     Chronic    Controlled type 2 diabetes mellitus with diabetic polyneuropathy, with long-term current use of insulin  [E11.42, Z79.4] 09/06/2012 Not Applicable      Problems Resolved During this Admission:      Discharged Condition: stable    Disposition: Home or Self Care    Follow Up:    Patient Instructions:      Notify your health care provider if you experience any of the following:  increased confusion or weakness     Activity as tolerated     Medications:  Reconciled Home Medications:      Medication List        CHANGE how you take these medications      predniSONE 1 MG tablet  Commonly known as: DELTASONE  TAKE 4 TABLETS BY MOUTH ONCE DAILY FOR  ONE  MONTH  THEN  DECREASE  BY  1  TABLET  EVERY  MONTH  IF  LABS  ALLOW  What changed:   how much to take  how to take this  when to take this  additional instructions            CONTINUE taking these medications      (MAGIC MOUTHWASH) 1:1:1 BENADRYL 12.5MG/5ML LIQ, ALUMINUM & MAGNESIUM  Swish and spit 5 mLs every 4 (four) hours as needed (mouth pain). for mouth sores     ACCU-CHEK FASTCLIX LANCING DEV Kit  Generic drug: lancing device with lancets  USE AS DIRECTED.     albuterol-ipratropium 2.5 mg-0.5 mg/3 mL nebulizer solution  Commonly known as: DUO-NEB  Take 3 mLs by nebulization every 4 (four) hours as needed for Wheezing or Shortness of Breath. Rescue     alpha lipoic acid 600 mg Cap  Take 600 mg by mouth once daily.     apixaban 5 mg Tab  Commonly known as: ELIQUIS  Take 1 tablet (5 mg total) by mouth 2 (two) times daily. Start this prescription after finishing starter pack     atorvastatin 20 MG tablet  Commonly known as: LIPITOR  Take 1 tablet (20 mg total) by mouth once daily.     * blood sugar diagnostic Strp  Commonly known as: ACCU-CHEK SMARTVIEW TEST STRIP  Use as directed to check blood sugar twice daily.     * blood sugar diagnostic Strp  To check BG three times daily, to use with insurance preferred meter     blood-glucose meter kit  Use as instructed     CALCIUM-VITAMIN D ORAL  Take 1 tablet by mouth once daily.     carvediloL 25 MG tablet  Commonly known as:  "COREG  Take 1 tablet (25 mg total) by mouth 2 (two) times daily.     cetirizine 10 MG tablet  Commonly known as: ZYRTEC  Take 1 tablet (10 mg total) by mouth once daily.     chlorthalidone 25 MG Tab  Commonly known as: HYGROTEN  Take 1 tablet (25 mg total) by mouth once daily.     clotrimazole 1 % cream  Commonly known as: LOTRIMIN  Apply topically 2 (two) times daily.     diclofenac sodium 1 % Gel  Commonly known as: VOLTAREN  Apply 2 g topically once daily.     DULoxetine 60 MG capsule  Commonly known as: CYMBALTA  Take 1 capsule (60 mg total) by mouth once daily.     fenofibrate 160 MG Tab  Take 1 tablet by mouth once daily     fluticasone propion-salmeterol 115-21 mcg/dose 115-21 mcg/actuation Hfaa inhaler  Commonly known as: ADVAIR HFA  Inhale 2 puffs into the lungs every 12 (twelve) hours. Controller     folic acid 1 MG tablet  Commonly known as: FOLVITE  Take 1 tablet (1,000 mcg total) by mouth once daily.     LANTUS SOLOSTAR U-100 INSULIN 100 unit/mL (3 mL) Inpn pen  Generic drug: insulin glargine U-100 (Lantus)  Inject 30 Units into the skin 2 (two) times a day.     levETIRAcetam 750 MG Tab  Commonly known as: KEPPRA  Take 1 tablet (750 mg total) by mouth 2 (two) times daily.     levothyroxine 50 MCG tablet  Commonly known as: SYNTHROID  Take 1 tablet (50 mcg total) by mouth before breakfast.     linaCLOtide 72 mcg Cap capsule  Commonly known as: LINZESS  Take 1 capsule (72 mcg total) by mouth before breakfast.     memantine 5 MG Tab  Commonly known as: NAMENDA  Take 1 tablet (5 mg total) by mouth 2 (two) times daily.     NIFEdipine 60 MG (OSM) 24 hr tablet  Commonly known as: PROCARDIA-XL  Take 1 tablet (60 mg total) by mouth 2 (two) times a day.     pantoprazole 40 MG tablet  Commonly known as: PROTONIX  Take 1 tablet (40 mg total) by mouth once daily.     pen needle, diabetic 32 gauge x 5/32" Ndle  Commonly known as: BD ULTRA-FINE ANA PEN NEEDLE  For use with insulin pen     pregabalin 75 MG " capsule  Commonly known as: LYRICA  Take 1 capsule (75 mg total) by mouth 2 (two) times daily.     tiZANidine 2 MG tablet  Commonly known as: ZANAFLEX  Take 2 tablets (4 mg total) by mouth every 8 (eight) hours as needed (muscle spasm).     VITAMIN B-12 ORAL  Take 1 tablet by mouth once daily.           * This list has 2 medication(s) that are the same as other medications prescribed for you. Read the directions carefully, and ask your doctor or other care provider to review them with you.                  Significant Diagnostic Studies: Radiology: CT scan:  Suspected 3.5 x 2.6 x 3.9 mm aneurysm of the right A1 CHLOE or anterior communicating artery.  Correlate clinically and with appropriate follow-up.    Pending Diagnostic Studies:       Procedure Component Value Units Date/Time    Levetiracetam Level [[id number]] Collected: 03/24/25 0927    Order Status: Sent Lab Status: No result     Specimen: Blood           Indwelling Lines/Drains at time of discharge:   Lines/Drains/Airways       None                   Time spent on the discharge of patient: 25 minutes         Juan Luis Loyola MD  Department of Hospital Medicine  [facility] - Internal Medicine Telemetry

## 2025-03-25 NOTE — TELEPHONE ENCOUNTER
Can we schedule her for hospital follow up virtual is okay if patient is willing    Micaela Mendoza MD

## 2025-03-26 ENCOUNTER — PATIENT OUTREACH (OUTPATIENT)
Dept: ADMINISTRATIVE | Facility: CLINIC | Age: 80
End: 2025-03-26
Payer: MEDICARE

## 2025-03-26 ENCOUNTER — OFFICE VISIT (OUTPATIENT)
Dept: HEMATOLOGY/ONCOLOGY | Facility: CLINIC | Age: 80
End: 2025-03-26
Payer: MEDICARE

## 2025-03-26 VITALS
HEART RATE: 90 BPM | DIASTOLIC BLOOD PRESSURE: 70 MMHG | HEIGHT: 62 IN | SYSTOLIC BLOOD PRESSURE: 146 MMHG | WEIGHT: 148.13 LBS | TEMPERATURE: 98 F | OXYGEN SATURATION: 98 % | BODY MASS INDEX: 27.26 KG/M2

## 2025-03-26 DIAGNOSIS — D86.9 SARCOIDOSIS: ICD-10-CM

## 2025-03-26 DIAGNOSIS — N18.9 CHRONIC KIDNEY DISEASE, UNSPECIFIED CKD STAGE: ICD-10-CM

## 2025-03-26 DIAGNOSIS — N18.9 ANEMIA IN CHRONIC KIDNEY DISEASE, UNSPECIFIED CKD STAGE: Primary | ICD-10-CM

## 2025-03-26 DIAGNOSIS — Z79.01 CHRONIC ANTICOAGULATION: ICD-10-CM

## 2025-03-26 DIAGNOSIS — D63.1 ANEMIA IN CHRONIC KIDNEY DISEASE, UNSPECIFIED CKD STAGE: Primary | ICD-10-CM

## 2025-03-26 DIAGNOSIS — Z86.711 HISTORY OF PULMONARY EMBOLISM: ICD-10-CM

## 2025-03-26 PROCEDURE — 1111F DSCHRG MED/CURRENT MED MERGE: CPT | Mod: HCNC,CPTII,S$GLB, | Performed by: INTERNAL MEDICINE

## 2025-03-26 PROCEDURE — 1126F AMNT PAIN NOTED NONE PRSNT: CPT | Mod: HCNC,CPTII,S$GLB, | Performed by: INTERNAL MEDICINE

## 2025-03-26 PROCEDURE — 1157F ADVNC CARE PLAN IN RCRD: CPT | Mod: HCNC,CPTII,S$GLB, | Performed by: INTERNAL MEDICINE

## 2025-03-26 PROCEDURE — 99214 OFFICE O/P EST MOD 30 MIN: CPT | Mod: HCNC,S$GLB,, | Performed by: INTERNAL MEDICINE

## 2025-03-26 PROCEDURE — 3078F DIAST BP <80 MM HG: CPT | Mod: HCNC,CPTII,S$GLB, | Performed by: INTERNAL MEDICINE

## 2025-03-26 PROCEDURE — 1159F MED LIST DOCD IN RCRD: CPT | Mod: HCNC,CPTII,S$GLB, | Performed by: INTERNAL MEDICINE

## 2025-03-26 PROCEDURE — 3077F SYST BP >= 140 MM HG: CPT | Mod: HCNC,CPTII,S$GLB, | Performed by: INTERNAL MEDICINE

## 2025-03-26 PROCEDURE — 1101F PT FALLS ASSESS-DOCD LE1/YR: CPT | Mod: HCNC,CPTII,S$GLB, | Performed by: INTERNAL MEDICINE

## 2025-03-26 PROCEDURE — 99999 PR PBB SHADOW E&M-EST. PATIENT-LVL V: CPT | Mod: PBBFAC,HCNC,, | Performed by: INTERNAL MEDICINE

## 2025-03-26 PROCEDURE — 3288F FALL RISK ASSESSMENT DOCD: CPT | Mod: HCNC,CPTII,S$GLB, | Performed by: INTERNAL MEDICINE

## 2025-03-26 NOTE — PROGRESS NOTES
C3 nurse attempted to contact Regino Lawrence  for a TCC post hospital discharge follow up call. No answer. Left voicemail with callback information. The patient has a scheduled HOSFU appointment with Micaela Mendoza MD  on 03/29/2025 @ 1000.     Message sent to PCP staff.     INDICATION:  PROLONGED FEVER WITHOUT SOURCE R50.9.



TECHNIQUE:  Two view chest   1:43 PM



CORRELATION STUDY:   None



FINDINGS: 

The heart size, mediastinal configuration and pulmonary

vasculature are within normal limits.  There is presence of

streaky bilateral perihilar infiltrates. More peripherally, there

is no focal lobar consolidation. No pleural effusion or

pneumothorax.  Visualized osseous structures are unremarkable.

Gastric feeding tube left upper quadrant.



IMPRESSION: 

1. Streaky bilateral perihilar infiltrates could reflect a

viral-type pneumonitis and/or reactive airway changes. No focal

lobar consolidation.

 



Dictated by: 



  Dictated on workstation # [id number]

## 2025-03-26 NOTE — PROGRESS NOTES
Subjective:        Patient ID: Regino Lawrence is a 79 y.o. female.    Chief Complaint: Follow-up anemia      Diagnosis: Anemia in CKD  Patient is a Zoroastrian  .  Prior Hx;  The patient is seen today for f/u  chronic anemia in CKD undergoing EPO injections.The patient reports that she has been diagnosed with JOLLY in the past.  She has been on oral iron supplementation therapy, but could not tolerate or did not respond, she is uncertainShe also has  undergone intermittent IV iron therapy.  She is followed by GI and has undergone a colonoscopy earlier this year and was diagnosed with hemorrhoids for which she underwent banding procedure.  No melena, hematochezia,change in bowel habits.  She has also been diagnosed with B12 deficiency in the past, but reports she did not respond to B12 injections. No history of blood transfusions.  She is a Zoroastrian.  She reports that she remembers getting injections in the 1970s when her blood count was low.   She is followed by Rheumatology for history of sarcoidosis with associated myopathy and arthropathy. She has been treated in the  past with methotrexate and Plaquenil, both of which were ineffectiveCellcept and imuran caused some unknown side effect. She is followed by PCP for DMShe was admitted 6/2022 with worsening SOB. She also has history of cervical spinal stenosis s/p posterior C3-C7 laminectomy and fusion on 11/16/15 by Dr. Conner.  Diagnosed with sarcoid at [facility] in [facility]. Previously on Plaquenil and methotrexate but both discontinued due to adverse effects.  Leflunomide was also attempted but held due to elevated blood pressure. Currently on prednisone 3 mg daily.       Interval Hx; arrived > 40 min late   [facility]  [facility] for neuro deficits and SCOTT on CKD  CTA stroke and MRI brain negative for acute intracranial hemorrhage or major vascular distribution infarct.   Continues with chronic mild FAUSTIN    She is undergoing  "epo inj q 2wks  She also has  undergone intermittent IV iron therapy  Hb 11.2 g/dL on 6/3/24  Hb 10.8g/dL on 24   Hb  11.2g/dL on 3/25/25         She continues with Chronic diffuse arthralgias   Pain better controlled with prescribed medication  She is no longer followed by pain mgmt  Remains on Keppra for seizure d/o    She is f/b Rheumatology for sarcoidosis      PAST MEDICAL HISTORY:  Acid reflux, alopecia, anemia, anxiety disorder, chronic  kidney disease, depression, diabetes mellitus type 2, hyperlipidemia,  hypertension, hypothyroidism, osteoporosis, sarcoidosis.    PAST SURGICAL HISTORY:  Cholecystectomy, , tubal ligation, carpal tunnel release, cataracts.    FAMILY HISTORY: Unremarkable for cancer. Significant for HTN.       Review of Systems   Constitutional:  Positive for fatigue (chronic, mild). Negative for activity change and appetite change.   HENT:  Negative for hearing loss and nosebleeds.    Eyes:  Negative for visual disturbance.   Respiratory:  Positive for shortness of breath (mild FAUSTIN, chronic). Negative for cough.    Cardiovascular:  Negative for chest pain and leg swelling.   Gastrointestinal:  Positive for constipation. Negative for abdominal pain, diarrhea and nausea.   Genitourinary:  Negative for flank pain and urgency.   Musculoskeletal:  Positive for arthralgias, back pain, joint swelling and myalgias. Negative for gait problem.   Skin:  Negative for rash.        No petechiae, ecchymoses   Neurological:  Negative for weakness, light-headedness and headaches.   Hematological:  Negative for adenopathy. Does not bruise/bleed easily.       Objective:         Vitals:    25 1510   BP: (!) 146/70   BP Location: Left arm   Patient Position: Sitting   Pulse: 90   Temp: 98.1 °F (36.7 °C)   SpO2: 98%   Weight: 67.2 kg (148 lb 2.4 oz)   Height: 5' 2" (1.575 m)               .Physical Exam   Constitutional: She is oriented to person, place, and time. She appears well-developed " and well-nourished in motorized scooter  HENT:   Head: Normocephalic.   Eyes: Conjunctivae and lids are normal.No scleral icterus.   Neck: Normal range of motion. Neck supple.  Cardiovascular: tachycardic   No murmur heard.  Pulmonary/Chest: Breath sounds normal. She has no wheezes. She has no rales.   Abdominal: Soft. Bowel sounds are normal. There is no tenderness. There is no rebound and no guarding.   Musculoskeletal: Ambulates w/assistance of motorized scooter  Neurological: She is alert and oriented to person, place, and time. No cranial nerve deficit.  Skin: Skin is warm and dry. No ecchymosis, no petechiae and no rash noted. No erythema.   Psychiatric: She has a normal mood and affect.               Lab Results   Component Value Date    WBC 3.77 (L) 03/25/2025    HGB 11.2 (L) 03/25/2025    HCT 34.4 (L) 03/25/2025     (H) 03/25/2025     03/25/2025     Lab Results   Component Value Date    IRON 64 03/21/2025    TIBC 355 03/21/2025    FERRITIN 767 (H) 03/21/2025     SPEP-nl      CT renal 3/6/2017   IMPRESSION:  1.  No renal, ureteral or bladder calculi.  No hydronephrosis or ureterectasis.  2.  Poorly distended bladder.  Mild bladder wall prominence.  Mild cystitis cannot be   excluded.  3.  Moderate constipation.  Normal appendix      Lab Results   Component Value Date    IRON 64 03/21/2025    TIBC 355 03/21/2025    FERRITIN 767 (H) 03/21/2025     Lab Results   Component Value Date    WBC 3.77 (L) 03/25/2025    HGB 11.2 (L) 03/25/2025    HCT 34.4 (L) 03/25/2025     (H) 03/25/2025     03/25/2025         Assessment:       1. Anemia in chronic kidney disease, unspecified CKD stage    2. Chronic kidney disease, unspecified CKD stage    3. History of pulmonary embolism    4. Chronic anticoagulation                    Plan:   1.2..   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 It has been determined early CKD stage III, suspect due to age-related renal nephron loss, along with possible diabetic nephropathy v. hypertensive nephrosclerosis  CBC q2wks STANDING  Cont  EPO dosage  inj q 2wks( pending lab parameters)  Continue periodic monitoring of Fe studies  Hb 11.2 g/dL on 6/3/24  Hb 10.8g/dL on 9/16/24   Hb 10.89g/dL on 12/13/24  Hb  11.2g/dL on 3/25/25   Fe studies reviewed    According to KIDIGO guidelines patients with CKD , a  gfr , <60 cc/min and anemia, iron therapy is appropriate if the Tsat is < 30 and ferritin < 500 mg/dl.    Cont KRISTINA q 2wks ( pending lab parameters)  Repeat CBC and Fe studies in 2mos    4 stable  Last Cr level 1.3 mg/dL on 3/25/25   In process of establishing care with new Nephrologist    3. seen on 2021 CTA chest.  RML and RLL.  Currently on Eliquis.  Endorses strict adherence with chronic anticoagulation    4. It was determined appears  unprovoked.  Continue apixaban indefinitely    5. F/b Rheumatology  Manifested by myopathy and arthropathy.  Persistent joint pain and myalgias . Unable to tolerate immunosuppressants/steroid sparing agents  She remains on chronic  steroids 3mg            CBC q2wks STANDING placed x 10  Cont EPO  inj q 2wks( pending lab parameters)  F/u in 3mos with cbc, TIBC and Ferritin studies prior to f/u -standing orders x 6          Advance Care Planning     Power of   I previously initiated the process of advance care planning today and explained the importance of this process to the patient.  I introduced the concept of advance directives to the patient, as well. Then the patient received detailed information about the importance of designating a Health Care Power of  (HCPOA). She was also instructed to communicate with this person about their wishes for future healthcare, should she become sick and lose decision-making capacity. The patient has  previously appointed a HCPOA. Pt completed in 2015            CC: Micaela Mendoza M.D.

## 2025-03-26 NOTE — Clinical Note
CBC q2wks STANDING placed x 10 Cont EPO  inj q 2wks( pending lab parameters) F/u in 3mos with cbc, TIBC and Ferritin studies prior to f/u -standing orders x 6  Also add Fe studies to follow up labs in 3mos

## 2025-03-27 ENCOUNTER — TELEPHONE (OUTPATIENT)
Dept: FAMILY MEDICINE | Facility: CLINIC | Age: 80
End: 2025-03-27
Payer: MEDICARE

## 2025-03-27 NOTE — PLAN OF CARE
Through communication with [facility], the Inpatient order is being changed to observation as the payer will not authorize Inpatient and the facility agrees not to appeal or challenge the change in status. The account will be changed to Observation for billing purposes.

## 2025-03-27 NOTE — PROGRESS NOTES
C3 nurse spoke with Regino Lawrence  for a TCC post hospital discharge follow up call. The patient has a scheduled HOSFU appointment with Micaela Mendoza MD on 03/29/2025 @ 1000. Message sent to PCP staff.

## 2025-03-29 ENCOUNTER — OFFICE VISIT (OUTPATIENT)
Dept: FAMILY MEDICINE | Facility: CLINIC | Age: 80
End: 2025-03-29
Payer: MEDICARE

## 2025-03-29 VITALS
TEMPERATURE: 98 F | DIASTOLIC BLOOD PRESSURE: 62 MMHG | OXYGEN SATURATION: 97 % | HEIGHT: 62 IN | SYSTOLIC BLOOD PRESSURE: 136 MMHG | BODY MASS INDEX: 26.45 KG/M2 | WEIGHT: 143.75 LBS | HEART RATE: 94 BPM

## 2025-03-29 DIAGNOSIS — E11.3292 CONTROLLED TYPE 2 DIABETES MELLITUS WITH LEFT EYE AFFECTED BY MILD NONPROLIFERATIVE RETINOPATHY WITHOUT MACULAR EDEMA, WITHOUT LONG-TERM CURRENT USE OF INSULIN: Chronic | ICD-10-CM

## 2025-03-29 DIAGNOSIS — Z09 HOSPITAL DISCHARGE FOLLOW-UP: Primary | ICD-10-CM

## 2025-03-29 DIAGNOSIS — G89.29 OTHER CHRONIC PAIN: ICD-10-CM

## 2025-03-29 DIAGNOSIS — E11.44 CONTROLLED TYPE 2 DIABETES MELLITUS WITH DIABETIC AMYOTROPHY, WITH LONG-TERM CURRENT USE OF INSULIN: ICD-10-CM

## 2025-03-29 DIAGNOSIS — E87.1 HYPONATREMIA: ICD-10-CM

## 2025-03-29 DIAGNOSIS — I67.1 BRAIN ANEURYSM: ICD-10-CM

## 2025-03-29 DIAGNOSIS — M62.81 MUSCLE WEAKNESS: ICD-10-CM

## 2025-03-29 DIAGNOSIS — Z79.4 CONTROLLED TYPE 2 DIABETES MELLITUS WITH DIABETIC AMYOTROPHY, WITH LONG-TERM CURRENT USE OF INSULIN: ICD-10-CM

## 2025-03-29 DIAGNOSIS — D86.9 SARCOIDOSIS: Chronic | ICD-10-CM

## 2025-03-29 DIAGNOSIS — R53.83 FATIGUE, UNSPECIFIED TYPE: ICD-10-CM

## 2025-03-29 DIAGNOSIS — R29.90 EPISODE OF TRANSIENT NEUROLOGIC SYMPTOMS: ICD-10-CM

## 2025-03-29 PROCEDURE — 99999 PR PBB SHADOW E&M-EST. PATIENT-LVL III: CPT | Mod: PBBFAC,HCNC,, | Performed by: FAMILY MEDICINE

## 2025-03-29 PROCEDURE — 99215 OFFICE O/P EST HI 40 MIN: CPT | Mod: HCNC,S$GLB,, | Performed by: FAMILY MEDICINE

## 2025-03-29 PROCEDURE — 1125F AMNT PAIN NOTED PAIN PRSNT: CPT | Mod: HCNC,CPTII,S$GLB, | Performed by: FAMILY MEDICINE

## 2025-03-29 PROCEDURE — 3075F SYST BP GE 130 - 139MM HG: CPT | Mod: HCNC,CPTII,S$GLB, | Performed by: FAMILY MEDICINE

## 2025-03-29 PROCEDURE — 3078F DIAST BP <80 MM HG: CPT | Mod: HCNC,CPTII,S$GLB, | Performed by: FAMILY MEDICINE

## 2025-03-29 PROCEDURE — 1157F ADVNC CARE PLAN IN RCRD: CPT | Mod: HCNC,CPTII,S$GLB, | Performed by: FAMILY MEDICINE

## 2025-03-29 PROCEDURE — 1111F DSCHRG MED/CURRENT MED MERGE: CPT | Mod: HCNC,CPTII,S$GLB, | Performed by: FAMILY MEDICINE

## 2025-03-29 PROCEDURE — 3288F FALL RISK ASSESSMENT DOCD: CPT | Mod: HCNC,CPTII,S$GLB, | Performed by: FAMILY MEDICINE

## 2025-03-29 PROCEDURE — 1101F PT FALLS ASSESS-DOCD LE1/YR: CPT | Mod: HCNC,CPTII,S$GLB, | Performed by: FAMILY MEDICINE

## 2025-03-29 NOTE — PROGRESS NOTES
Chief Complaint   Patient presents with    Hospital Follow Up     Hospital f/u. Pt has c/o weakness and Rt side pain.       HPI  Regino Lawrence is a 79 y.o. 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 Imaging negative for acute hemorrhage and infarct, sodium was found to be 123 and improved to 129 with IVF    CT Head with incidental finding of suspected 3.5 x 2.6 x 3.9 mm aneurysm of the right A1 CHLOE or anterior communicating artery. Will need follow up. She had recent MRA with no aneursym    Only taking insulin once daily because AM sugars are normal ranges in 129, at night BG are 180s to 200s, sugar comes down to normal with insulin shot; she does report lower abdominal pain that radiates to her back, no nausea/vomiting, constipation improved back on linzess, she is not very mobile due to pain and spends a lot of time laying down, she does try to move around the house with her walker but is very weak    She has not had any symptoms of left facial droop or slurred speech but did wake up feeling weak and sore and was not sure if she had a seizure, this occurred two nights ago.           ALLERGIES AND MEDICATIONS: updated and reviewed.  Review of patient's allergies indicates:   Allergen Reactions    Azathioprine Shortness Of Breath and Other (See Comments)     Fatigue     Medication List with Changes/Refills   Current Medications    (MAGIC MOUTHWASH) 1:1:1 DIPHENHYDRAMINE(BENADRYL) 12.5MG/5ML LIQ, ALUMINUM & MAGNESIUM HYDROXIDE-SIMETHICONE (MAALOX), LIDOCAINE VISCOUS 2%    Swish and spit 5 mLs every 4 (four) hours as needed (mouth pain). for  mouth sores    ACCU-CHEK FASTCLIX LANCING DEV KIT    USE AS DIRECTED.    ALBUTEROL-IPRATROPIUM (DUO-NEB) 2.5 MG-0.5 MG/3 ML NEBULIZER SOLUTION    Take 3 mLs by nebulization every 4 (four) hours as needed for Wheezing or Shortness of Breath. Rescue    ALPHA LIPOIC ACID 600 MG CAP    Take 600 mg by mouth once daily.    APIXABAN (ELIQUIS) 5 MG TAB    Take 1 tablet (5 mg total) by mouth 2 (two) times daily. Start this prescription after finishing starter pack    ATORVASTATIN (LIPITOR) 20 MG TABLET    Take 1 tablet (20 mg total) by mouth once daily.    BLOOD SUGAR DIAGNOSTIC (ACCU-CHEK SMARTVIEW TEST STRIP) STRP    Use as directed to check blood sugar twice daily.    BLOOD SUGAR DIAGNOSTIC STRP    To check BG three times daily, to use with insurance preferred meter    BLOOD-GLUCOSE METER KIT    Use as instructed    CALCIUM CARB/VIT D3/MINERALS (CALCIUM-VITAMIN D ORAL)    Take 1 tablet by mouth once daily.    CARVEDILOL (COREG) 25 MG TABLET    Take 1 tablet (25 mg total) by mouth 2 (two) times daily.    CETIRIZINE (ZYRTEC) 10 MG TABLET    Take 1 tablet (10 mg total) by mouth once daily.    CHLORTHALIDONE (HYGROTEN) 25 MG TAB    Take 1 tablet (25 mg total) by mouth once daily.    CLOTRIMAZOLE (LOTRIMIN) 1 % CREAM    Apply topically 2 (two) times daily.    CYANOCOBALAMIN, VITAMIN B-12, (VITAMIN B-12 ORAL)    Take 1 tablet by mouth once daily.    DICLOFENAC SODIUM (VOLTAREN) 1 % GEL    Apply 2 g topically once daily.    DULOXETINE (CYMBALTA) 60 MG CAPSULE    Take 1 capsule (60 mg total) by mouth once daily.    FENOFIBRATE 160 MG TAB    Take 1 tablet by mouth once daily    FLUTICASONE PROPION-SALMETEROL 115-21 MCG/DOSE (ADVAIR HFA) 115-21 MCG/ACTUATION HFAA INHALER    Inhale 2 puffs into the lungs every 12 (twelve) hours. Controller    FOLIC ACID (FOLVITE) 1 MG TABLET    Take 1 tablet (1,000 mcg total) by mouth once daily.    INSULIN GLARGINE U-100, LANTUS, (LANTUS SOLOSTAR U-100 INSULIN) 100 UNIT/ML (3 ML) INPN PEN    Inject  "30 Units into the skin 2 (two) times a day.    LEVETIRACETAM (KEPPRA) 750 MG TAB    Take 1 tablet (750 mg total) by mouth 2 (two) times daily.    LEVOTHYROXINE (SYNTHROID) 50 MCG TABLET    Take 1 tablet (50 mcg total) by mouth before breakfast.    LINACLOTIDE (LINZESS) 72 MCG CAP CAPSULE    Take 1 capsule (72 mcg total) by mouth before breakfast.    MEMANTINE (NAMENDA) 5 MG TAB    Take 1 tablet (5 mg total) by mouth 2 (two) times daily.    NIFEDIPINE (PROCARDIA-XL) 60 MG (OSM) 24 HR TABLET    Take 1 tablet (60 mg total) by mouth 2 (two) times a day.    PANTOPRAZOLE (PROTONIX) 40 MG TABLET    Take 1 tablet (40 mg total) by mouth once daily.    PEN NEEDLE, DIABETIC (BD ULTRA-FINE ANA PEN NEEDLE) 32 GAUGE X 5/32" NDLE    For use with insulin pen    PREDNISONE (DELTASONE) 1 MG TABLET    TAKE 4 TABLETS BY MOUTH ONCE DAILY FOR  ONE  MONTH  THEN  DECREASE  BY  1  TABLET  EVERY  MONTH  IF  LABS  ALLOW    PREGABALIN (LYRICA) 75 MG CAPSULE    Take 1 capsule (75 mg total) by mouth 2 (two) times daily.    TIZANIDINE (ZANAFLEX) 2 MG TABLET    Take 2 tablets (4 mg total) by mouth every 8 (eight) hours as needed (muscle spasm).       ROS  Review of Systems   Constitutional:  Positive for fatigue. Negative for chills, diaphoresis, fever and unexpected weight change.   HENT:  Negative for rhinorrhea, sinus pressure, sore throat and tinnitus.    Eyes:  Negative for photophobia and visual disturbance.   Respiratory:  Negative for cough, shortness of breath and wheezing.    Cardiovascular:  Negative for chest pain and palpitations.   Gastrointestinal:  Negative for abdominal pain, blood in stool, constipation, diarrhea, nausea and vomiting.   Genitourinary:  Negative for dysuria, flank pain, frequency and vaginal discharge.   Musculoskeletal:  Negative for arthralgias and joint swelling.   Skin:  Negative for rash.   Neurological:  Positive for weakness. Negative for speech difficulty, light-headedness and headaches. " "  Psychiatric/Behavioral:  Negative for behavioral problems and dysphoric mood.        Physical Exam  Vitals:    03/29/25 1009   BP: 136/62   BP Location: Left arm   Patient Position: Sitting   Pulse: 94   Temp: 97.9 °F (36.6 °C)   TempSrc: Oral   SpO2: 97%   Weight: 65.2 kg (143 lb 11.8 oz)   Height: 5' 2" (1.575 m)    Body mass index is 26.29 kg/m².  Weight: 65.2 kg (143 lb 11.8 oz)   Height: 5' 2" (157.5 cm)     Physical Exam  Vitals reviewed.   Constitutional:       General: She is not in acute distress.     Appearance: She is well-developed.   HENT:      Head: Normocephalic and atraumatic.   Cardiovascular:      Rate and Rhythm: Normal rate and regular rhythm.      Heart sounds: No murmur heard.     No friction rub. No gallop.   Pulmonary:      Effort: Pulmonary effort is normal. No respiratory distress.      Breath sounds: Normal breath sounds. No wheezing or rales.   Skin:     General: Skin is warm and dry.      Findings: No rash.   Neurological:      Mental Status: She is alert. Mental status is at baseline.   Psychiatric:         Behavior: Behavior normal.         Thought Content: Thought content normal.           Health maintenance reviewed and addressed as ordered      ASSESSMENT/PLAN     1. Hospital discharge follow-up (Primary)  In fair condition since discharge  Had a possible seizure since discharge? No one to witness but had post ictal symptoms  Recommend she return to ED if sx recur    2. Brain aneurysm  ? Aneursym not present on MRA but present on CTA will f/u with neurology to determine if vascular neuro consult needed    3. 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 Episode of transient neurologic symptoms  Will reach out to neurology Monday to determine if vascular consult is needed  Return to ED if symptoms recur  Imaging negative for stroke    5. Hyponatremia  Check labs, " offered to have levels at the hospital today but patient declines and will wait until Monday  ED if sx recur  ? If hyperglycemia or if we should stop diuretic  Is calcium being affected by prolia injection  - Comprehensive Metabolic Panel; Future  - Magnesium; Future  - PHOSPHORUS; Future  - Calcium, Ionized; Future  - Osmolality, Serum; Future    6. Muscle weakness  Unsure if this is due to low Na or deconditioning secondary to pain    7. Fatigue, unspecified type  Hx of anemia treated with epo every 2 weeks  Check lytes    8. Other chronic pain  Strongly encourage mobility, recommend HH with PT if lytes are normal  She is on lyrica for pain    9. Sarcoidosis  On chronic steroids due to patient inability to tolerate other treatments    10. 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    Orders Placed This Encounter   Procedures    Comprehensive Metabolic Panel    Magnesium    PHOSPHORUS    Calcium, Ionized    Osmolality, Serum

## 2025-03-31 ENCOUNTER — LAB VISIT (OUTPATIENT)
Dept: LAB | Facility: HOSPITAL | Age: 80
End: 2025-03-31
Attending: FAMILY MEDICINE
Payer: MEDICARE

## 2025-03-31 DIAGNOSIS — E87.1 HYPONATREMIA: ICD-10-CM

## 2025-03-31 LAB
ALBUMIN SERPL BCP-MCNC: 3.8 G/DL (ref 3.5–5.2)
ALP SERPL-CCNC: 65 UNIT/L (ref 40–150)
ALT SERPL W/O P-5'-P-CCNC: 16 UNIT/L (ref 10–44)
ANION GAP (OHS): 11 MMOL/L (ref 8–16)
AST SERPL-CCNC: 20 UNIT/L (ref 11–45)
BILIRUB SERPL-MCNC: 0.4 MG/DL (ref 0.1–1)
BUN SERPL-MCNC: 24 MG/DL (ref 8–23)
CA-I BLD-SCNC: 1.2 MMOL/L (ref 1.06–1.42)
CALCIUM SERPL-MCNC: 9.6 MG/DL (ref 8.7–10.5)
CHLORIDE SERPL-SCNC: 95 MMOL/L (ref 95–110)
CO2 SERPL-SCNC: 27 MMOL/L (ref 23–29)
CREAT SERPL-MCNC: 1.3 MG/DL (ref 0.5–1.4)
GFR SERPLBLD CREATININE-BSD FMLA CKD-EPI: 42 ML/MIN/1.73/M2
GLUCOSE SERPL-MCNC: 90 MG/DL (ref 70–110)
MAGNESIUM SERPL-MCNC: 1.1 MG/DL (ref 1.6–2.6)
OSMOLALITY SERPL: 300 MOSM/KG (ref 275–295)
PHOSPHATE SERPL-MCNC: 4.1 MG/DL (ref 2.7–4.5)
POTASSIUM SERPL-SCNC: 3.3 MMOL/L (ref 3.5–5.1)
PROT SERPL-MCNC: 7.1 GM/DL (ref 6–8.4)
SODIUM SERPL-SCNC: 133 MMOL/L (ref 136–145)

## 2025-03-31 PROCEDURE — 84100 ASSAY OF PHOSPHORUS: CPT | Mod: HCNC

## 2025-03-31 PROCEDURE — 80053 COMPREHEN METABOLIC PANEL: CPT | Mod: HCNC

## 2025-03-31 PROCEDURE — 83735 ASSAY OF MAGNESIUM: CPT | Mod: HCNC

## 2025-03-31 PROCEDURE — 36415 COLL VENOUS BLD VENIPUNCTURE: CPT | Mod: HCNC,PO

## 2025-03-31 PROCEDURE — 82330 ASSAY OF CALCIUM: CPT | Mod: HCNC

## 2025-03-31 PROCEDURE — 83930 ASSAY OF BLOOD OSMOLALITY: CPT | Mod: HCNC

## 2025-04-04 ENCOUNTER — INFUSION (OUTPATIENT)
Dept: INFUSION THERAPY | Facility: HOSPITAL | Age: 80
End: 2025-04-04
Attending: INTERNAL MEDICINE
Payer: MEDICARE

## 2025-04-04 ENCOUNTER — LAB VISIT (OUTPATIENT)
Dept: LAB | Facility: HOSPITAL | Age: 80
End: 2025-04-04
Attending: INTERNAL MEDICINE
Payer: MEDICARE

## 2025-04-04 VITALS
OXYGEN SATURATION: 100 % | TEMPERATURE: 98 F | DIASTOLIC BLOOD PRESSURE: 48 MMHG | SYSTOLIC BLOOD PRESSURE: 115 MMHG | HEART RATE: 104 BPM | RESPIRATION RATE: 18 BRPM

## 2025-04-04 DIAGNOSIS — D50.9 IRON DEFICIENCY ANEMIA, UNSPECIFIED IRON DEFICIENCY ANEMIA TYPE: ICD-10-CM

## 2025-04-04 DIAGNOSIS — Z53.1 PROCEDURE AND TREATMENT NOT CARRIED OUT BECAUSE OF PATIENT'S DECISION FOR REASONS OF BELIEF AND GROUP PRESSURE: ICD-10-CM

## 2025-04-04 DIAGNOSIS — N18.9 ANEMIA IN CHRONIC KIDNEY DISEASE, UNSPECIFIED CKD STAGE: ICD-10-CM

## 2025-04-04 DIAGNOSIS — N18.31 ANEMIA IN STAGE 3A CHRONIC KIDNEY DISEASE: ICD-10-CM

## 2025-04-04 DIAGNOSIS — D63.1 ANEMIA IN CHRONIC KIDNEY DISEASE, UNSPECIFIED CKD STAGE: ICD-10-CM

## 2025-04-04 DIAGNOSIS — D63.1 ANEMIA IN STAGE 3 CHRONIC KIDNEY DISEASE: Primary | ICD-10-CM

## 2025-04-04 DIAGNOSIS — N18.30 ANEMIA IN STAGE 3 CHRONIC KIDNEY DISEASE: Primary | ICD-10-CM

## 2025-04-04 DIAGNOSIS — D63.1 ANEMIA IN STAGE 3A CHRONIC KIDNEY DISEASE: ICD-10-CM

## 2025-04-04 LAB
ABSOLUTE EOSINOPHIL (OHS): 0.05 K/UL
ABSOLUTE MONOCYTE (OHS): 0.4 K/UL (ref 0.3–1)
ABSOLUTE NEUTROPHIL COUNT (OHS): 1.35 K/UL (ref 1.8–7.7)
BASOPHILS # BLD AUTO: 0.01 K/UL
BASOPHILS NFR BLD AUTO: 0.4 %
ERYTHROCYTE [DISTWIDTH] IN BLOOD BY AUTOMATED COUNT: 13.5 % (ref 11.5–14.5)
FERRITIN SERPL-MCNC: 781.5 NG/ML (ref 20–300)
HCT VFR BLD AUTO: 29.3 % (ref 37–48.5)
HGB BLD-MCNC: 10.2 GM/DL (ref 12–16)
IMM GRANULOCYTES # BLD AUTO: 0.01 K/UL (ref 0–0.04)
IMM GRANULOCYTES NFR BLD AUTO: 0.4 % (ref 0–0.5)
IRON SATN MFR SERPL: 29 % (ref 20–50)
IRON SERPL-MCNC: 95 UG/DL (ref 30–160)
LYMPHOCYTES # BLD AUTO: 0.71 K/UL (ref 1–4.8)
MCH RBC QN AUTO: 34.6 PG (ref 27–31)
MCHC RBC AUTO-ENTMCNC: 34.8 G/DL (ref 32–36)
MCV RBC AUTO: 99 FL (ref 82–98)
NUCLEATED RBC (/100WBC) (OHS): 0 /100 WBC
PLATELET # BLD AUTO: 249 K/UL (ref 150–450)
PMV BLD AUTO: 8.8 FL (ref 9.2–12.9)
RBC # BLD AUTO: 2.95 M/UL (ref 4–5.4)
RELATIVE EOSINOPHIL (OHS): 2 %
RELATIVE LYMPHOCYTE (OHS): 28.1 % (ref 18–48)
RELATIVE MONOCYTE (OHS): 15.8 % (ref 4–15)
RELATIVE NEUTROPHIL (OHS): 53.3 % (ref 38–73)
TIBC SERPL-MCNC: 324 UG/DL (ref 250–450)
TRANSFERRIN SERPL-MCNC: 219 MG/DL (ref 200–375)
WBC # BLD AUTO: 2.53 K/UL (ref 3.9–12.7)

## 2025-04-04 PROCEDURE — 96372 THER/PROPH/DIAG INJ SC/IM: CPT | Mod: HCNC

## 2025-04-04 PROCEDURE — 84466 ASSAY OF TRANSFERRIN: CPT | Mod: HCNC

## 2025-04-04 PROCEDURE — 82728 ASSAY OF FERRITIN: CPT | Mod: HCNC

## 2025-04-04 PROCEDURE — 63600175 PHARM REV CODE 636 W HCPCS: Mod: JZ,EC,TB,HCNC | Performed by: INTERNAL MEDICINE

## 2025-04-04 PROCEDURE — 36415 COLL VENOUS BLD VENIPUNCTURE: CPT | Mod: HCNC

## 2025-04-04 PROCEDURE — 85025 COMPLETE CBC W/AUTO DIFF WBC: CPT | Mod: HCNC

## 2025-04-04 RX ADMIN — EPOETIN ALFA-EPBX 40000 UNITS: 40000 INJECTION, SOLUTION INTRAVENOUS; SUBCUTANEOUS at 12:04

## 2025-04-07 ENCOUNTER — TELEPHONE (OUTPATIENT)
Dept: NEUROLOGY | Facility: CLINIC | Age: 80
End: 2025-04-07
Payer: MEDICARE

## 2025-04-07 NOTE — TELEPHONE ENCOUNTER
----- Message from Med Assistant Cortes sent at 4/7/2025  2:29 PM CDT -----  Regarding: Hospital f/u  Good afternoon,Please assist patient with scheduling a hospital f/u appt.Patient states her PCP believes she has an aneurysm and would like her to follow up with neurology to determine if she needs to be seen by a vascular neurologist. She hasn't heard from neurology yet. Pt ongoing symptoms (dizziness, intermittent numbness/tingling in extremties). Thank you

## 2025-04-08 ENCOUNTER — LAB VISIT (OUTPATIENT)
Dept: LAB | Facility: HOSPITAL | Age: 80
End: 2025-04-08
Attending: FAMILY MEDICINE
Payer: MEDICARE

## 2025-04-08 DIAGNOSIS — I10 ESSENTIAL HYPERTENSION: ICD-10-CM

## 2025-04-08 LAB
ANION GAP (OHS): 10 MMOL/L (ref 8–16)
BUN SERPL-MCNC: 28 MG/DL (ref 8–23)
CALCIUM SERPL-MCNC: 8.5 MG/DL (ref 8.7–10.5)
CHLORIDE SERPL-SCNC: 89 MMOL/L (ref 95–110)
CO2 SERPL-SCNC: 26 MMOL/L (ref 23–29)
CREAT SERPL-MCNC: 1.6 MG/DL (ref 0.5–1.4)
GFR SERPLBLD CREATININE-BSD FMLA CKD-EPI: 33 ML/MIN/1.73/M2
GLUCOSE SERPL-MCNC: 101 MG/DL (ref 70–110)
POTASSIUM SERPL-SCNC: 3.4 MMOL/L (ref 3.5–5.1)
SODIUM SERPL-SCNC: 125 MMOL/L (ref 136–145)

## 2025-04-08 PROCEDURE — 80048 BASIC METABOLIC PNL TOTAL CA: CPT | Mod: HCNC

## 2025-04-08 PROCEDURE — 36415 COLL VENOUS BLD VENIPUNCTURE: CPT | Mod: HCNC,PO

## 2025-04-10 ENCOUNTER — RESULTS FOLLOW-UP (OUTPATIENT)
Dept: FAMILY MEDICINE | Facility: CLINIC | Age: 80
End: 2025-04-10

## 2025-04-10 ENCOUNTER — TELEPHONE (OUTPATIENT)
Dept: FAMILY MEDICINE | Facility: CLINIC | Age: 80
End: 2025-04-10
Payer: MEDICARE

## 2025-04-10 DIAGNOSIS — E83.42 HYPOMAGNESEMIA: Primary | ICD-10-CM

## 2025-04-10 DIAGNOSIS — E83.51 LOW CALCIUM LEVELS: ICD-10-CM

## 2025-04-10 RX ORDER — MAGNESIUM 200 MG
TABLET ORAL
Qty: 4 EACH | Refills: 0 | Status: SHIPPED | OUTPATIENT
Start: 2025-04-10

## 2025-04-10 NOTE — TELEPHONE ENCOUNTER
Scheduled Arletha per non fasting labs on 04/14/2025. Asked Arletha to monitor blood pressure while not taking Chlorthalidone to make sure it's not running high. Educated patient on blood pressure readings any systolic (top) 140 or greater is high, and diastolic (bottom) is high if 90 or greater. Patient verbalized understanding.

## 2025-04-10 NOTE — TELEPHONE ENCOUNTER
Can we schedule labs for her next week    I've sent the prescription for four days of magnesium. Let us know if her blood pressure rises off of chlorthalidone    Micaela Mendoza MD

## 2025-04-10 NOTE — TELEPHONE ENCOUNTER
Please call her with the following results    Her sodium is improved but still low and her potassium and magnesium are low    Is she taking magnesium still, if not I will send a prescription.    Also how has her blood pressure been? I think we may need to stop her chlorthalidone to see if her sodium improves    Micaela Mendoza MD

## 2025-04-10 NOTE — TELEPHONE ENCOUNTER
"Contact Micaela Carter MD instructions/recommendations:  sodium is improved but still low and her potassium and magnesium are low     Is she taking magnesium still, if not I will send a prescription.     Also how has her blood pressure been? I think we may need to stop her chlorthalidone to see if her sodium improves.    Patient replied," I was given IV magnesium in the hospital, but I don't have any more at home. And which medication is the chlorthalidone?" Nurse explained that Micaela Mendoza MD will send a RX for magnesium to her local pharmacy. Chlorthalidone is a fluid pill that works on blood pressure as well. Patient stated okay so I need to stop that one. Nurse replied yes.   "

## 2025-04-14 ENCOUNTER — LAB VISIT (OUTPATIENT)
Dept: LAB | Facility: HOSPITAL | Age: 80
End: 2025-04-14
Attending: FAMILY MEDICINE
Payer: MEDICARE

## 2025-04-14 DIAGNOSIS — E83.42 HYPOMAGNESEMIA: ICD-10-CM

## 2025-04-14 DIAGNOSIS — E83.51 LOW CALCIUM LEVELS: ICD-10-CM

## 2025-04-14 LAB
ALBUMIN SERPL BCP-MCNC: 3.8 G/DL (ref 3.5–5.2)
ALP SERPL-CCNC: 73 UNIT/L (ref 40–150)
ALT SERPL W/O P-5'-P-CCNC: 21 UNIT/L (ref 10–44)
ANION GAP (OHS): 12 MMOL/L (ref 8–16)
AST SERPL-CCNC: 34 UNIT/L (ref 11–45)
BILIRUB SERPL-MCNC: 0.4 MG/DL (ref 0.1–1)
BUN SERPL-MCNC: 25 MG/DL (ref 8–23)
CA-I BLD-SCNC: 1.16 MMOL/L (ref 1.06–1.42)
CALCIUM SERPL-MCNC: 9.3 MG/DL (ref 8.7–10.5)
CHLORIDE SERPL-SCNC: 90 MMOL/L (ref 95–110)
CO2 SERPL-SCNC: 27 MMOL/L (ref 23–29)
CREAT SERPL-MCNC: 1.2 MG/DL (ref 0.5–1.4)
GFR SERPLBLD CREATININE-BSD FMLA CKD-EPI: 46 ML/MIN/1.73/M2
GLUCOSE SERPL-MCNC: 118 MG/DL (ref 70–110)
MAGNESIUM SERPL-MCNC: 1.3 MG/DL (ref 1.6–2.6)
POTASSIUM SERPL-SCNC: 3.6 MMOL/L (ref 3.5–5.1)
PROT SERPL-MCNC: 7.2 GM/DL (ref 6–8.4)
SODIUM SERPL-SCNC: 129 MMOL/L (ref 136–145)

## 2025-04-14 PROCEDURE — 83735 ASSAY OF MAGNESIUM: CPT | Mod: HCNC

## 2025-04-14 PROCEDURE — 80053 COMPREHEN METABOLIC PANEL: CPT | Mod: HCNC

## 2025-04-14 PROCEDURE — 36415 COLL VENOUS BLD VENIPUNCTURE: CPT | Mod: HCNC,PO

## 2025-04-14 PROCEDURE — 82330 ASSAY OF CALCIUM: CPT | Mod: HCNC

## 2025-04-16 DIAGNOSIS — E03.9 HYPOTHYROIDISM, UNSPECIFIED TYPE: ICD-10-CM

## 2025-04-16 DIAGNOSIS — E78.2 COMBINED HYPERLIPIDEMIA ASSOCIATED WITH TYPE 2 DIABETES MELLITUS: Chronic | ICD-10-CM

## 2025-04-16 DIAGNOSIS — E11.69 COMBINED HYPERLIPIDEMIA ASSOCIATED WITH TYPE 2 DIABETES MELLITUS: Chronic | ICD-10-CM

## 2025-04-16 RX ORDER — ATORVASTATIN CALCIUM 20 MG/1
20 TABLET, FILM COATED ORAL DAILY
Qty: 90 TABLET | Refills: 3 | Status: SHIPPED | OUTPATIENT
Start: 2025-04-16

## 2025-04-16 RX ORDER — LEVOTHYROXINE SODIUM 50 UG/1
50 TABLET ORAL
Qty: 90 TABLET | Refills: 3 | Status: SHIPPED | OUTPATIENT
Start: 2025-04-16

## 2025-04-16 RX ORDER — ATORVASTATIN CALCIUM 20 MG/1
20 TABLET, FILM COATED ORAL
Qty: 90 TABLET | Refills: 0 | OUTPATIENT
Start: 2025-04-16

## 2025-04-16 NOTE — TELEPHONE ENCOUNTER
Refill Decision Note   Regino Lawrence  is requesting a refill authorization.  Brief Assessment and Rationale for Refill:  Approve     Medication Therapy Plan:         Pharmacist review requested: Yes   Extended chart review required: Yes   Comments:     Note composed:4:26 PM 04/16/2025

## 2025-04-16 NOTE — TELEPHONE ENCOUNTER
No care due was identified.  [facility] Embedded Care Due Messages. Reference number: [id number].   4/16/2025 10:57:43 AM CDT

## 2025-04-16 NOTE — TELEPHONE ENCOUNTER
Refill Routing Note   Medication(s) are not appropriate for processing by [facility] for the following reason(s):        Drug-disease interaction    [facility] action(s):  Approve  Defer      Medication Therapy Plan: Drug-Disease: atorvastatin and SCOTT (acute kidney injury); Acute renal failure superimposed on stage 3a chronic kidney disease; Acute renal failure superimposed on stage 3a chronic kidney disease, unspecified acute renal failure type; Drug-Disease: atorvastatin and Myopathy    Pharmacist review requested: Yes     Appointments  past 12m or future 3m with PCP    Date Provider   Last Visit   3/29/2025 Micaela Mendoza MD   Next Visit   4/28/2025 Micaela Mendoza MD   ED visits in past 90 days: 0        Note composed:3:01 PM 04/16/2025

## 2025-04-16 NOTE — TELEPHONE ENCOUNTER
Unable to retrieve patient chart and identify care due.  [facility] Embedded Care Due Messages. Reference number: [id number].   4/16/2025 11:14:50 AM CDT

## 2025-04-16 NOTE — TELEPHONE ENCOUNTER
Refill Decision Note   Aranammeghna Lawrence  is requesting a refill authorization.  Brief Assessment and Rationale for Refill:  Quick Discontinue     Medication Therapy Plan: PENDED IN ANOTHER ENCOUNTER      Comments:     Note composed:3:03 PM 04/16/2025

## 2025-04-22 ENCOUNTER — RESULTS FOLLOW-UP (OUTPATIENT)
Dept: FAMILY MEDICINE | Facility: CLINIC | Age: 80
End: 2025-04-22

## 2025-04-22 DIAGNOSIS — E83.42 HYPOMAGNESEMIA: Primary | ICD-10-CM

## 2025-04-22 DIAGNOSIS — E83.51 LOW CALCIUM LEVELS: ICD-10-CM

## 2025-04-25 ENCOUNTER — INFUSION (OUTPATIENT)
Dept: INFUSION THERAPY | Facility: HOSPITAL | Age: 80
End: 2025-04-25
Attending: INTERNAL MEDICINE
Payer: MEDICARE

## 2025-04-25 ENCOUNTER — LAB VISIT (OUTPATIENT)
Dept: LAB | Facility: HOSPITAL | Age: 80
End: 2025-04-25
Attending: INTERNAL MEDICINE
Payer: MEDICARE

## 2025-04-25 VITALS
DIASTOLIC BLOOD PRESSURE: 60 MMHG | TEMPERATURE: 98 F | RESPIRATION RATE: 16 BRPM | SYSTOLIC BLOOD PRESSURE: 127 MMHG | OXYGEN SATURATION: 97 % | HEART RATE: 89 BPM

## 2025-04-25 DIAGNOSIS — D63.1 ANEMIA IN STAGE 3A CHRONIC KIDNEY DISEASE: ICD-10-CM

## 2025-04-25 DIAGNOSIS — D63.1 ANEMIA IN CHRONIC KIDNEY DISEASE, UNSPECIFIED CKD STAGE: ICD-10-CM

## 2025-04-25 DIAGNOSIS — D63.1 ANEMIA IN STAGE 3 CHRONIC KIDNEY DISEASE: Primary | ICD-10-CM

## 2025-04-25 DIAGNOSIS — E83.42 HYPOMAGNESEMIA: ICD-10-CM

## 2025-04-25 DIAGNOSIS — N18.9 ANEMIA IN CHRONIC KIDNEY DISEASE, UNSPECIFIED CKD STAGE: ICD-10-CM

## 2025-04-25 DIAGNOSIS — Z53.1 PROCEDURE AND TREATMENT NOT CARRIED OUT BECAUSE OF PATIENT'S DECISION FOR REASONS OF BELIEF AND GROUP PRESSURE: ICD-10-CM

## 2025-04-25 DIAGNOSIS — N18.31 ANEMIA IN STAGE 3A CHRONIC KIDNEY DISEASE: ICD-10-CM

## 2025-04-25 DIAGNOSIS — D50.9 IRON DEFICIENCY ANEMIA, UNSPECIFIED IRON DEFICIENCY ANEMIA TYPE: ICD-10-CM

## 2025-04-25 DIAGNOSIS — E83.51 LOW CALCIUM LEVELS: ICD-10-CM

## 2025-04-25 DIAGNOSIS — N18.30 ANEMIA IN STAGE 3 CHRONIC KIDNEY DISEASE: Primary | ICD-10-CM

## 2025-04-25 LAB
ABSOLUTE EOSINOPHIL (OHS): 0.06 K/UL
ABSOLUTE MONOCYTE (OHS): 0.47 K/UL (ref 0.3–1)
ABSOLUTE NEUTROPHIL COUNT (OHS): 1.98 K/UL (ref 1.8–7.7)
ALBUMIN SERPL BCP-MCNC: 3.9 G/DL (ref 3.5–5.2)
ALP SERPL-CCNC: 80 UNIT/L (ref 40–150)
ALT SERPL W/O P-5'-P-CCNC: 19 UNIT/L (ref 10–44)
ANION GAP (OHS): 8 MMOL/L (ref 8–16)
AST SERPL-CCNC: 21 UNIT/L (ref 11–45)
BASOPHILS # BLD AUTO: 0.02 K/UL
BASOPHILS NFR BLD AUTO: 0.6 %
BILIRUB SERPL-MCNC: 0.4 MG/DL (ref 0.1–1)
BUN SERPL-MCNC: 21 MG/DL (ref 8–23)
CALCIUM SERPL-MCNC: 9 MG/DL (ref 8.7–10.5)
CHLORIDE SERPL-SCNC: 97 MMOL/L (ref 95–110)
CO2 SERPL-SCNC: 27 MMOL/L (ref 23–29)
CREAT SERPL-MCNC: 1.2 MG/DL (ref 0.5–1.4)
ERYTHROCYTE [DISTWIDTH] IN BLOOD BY AUTOMATED COUNT: 13.4 % (ref 11.5–14.5)
FERRITIN SERPL-MCNC: 656.3 NG/ML (ref 20–300)
GFR SERPLBLD CREATININE-BSD FMLA CKD-EPI: 46 ML/MIN/1.73/M2
GLUCOSE SERPL-MCNC: 119 MG/DL (ref 70–110)
HCT VFR BLD AUTO: 32.8 % (ref 37–48.5)
HGB BLD-MCNC: 10.8 GM/DL (ref 12–16)
IMM GRANULOCYTES # BLD AUTO: 0.01 K/UL (ref 0–0.04)
IMM GRANULOCYTES NFR BLD AUTO: 0.3 % (ref 0–0.5)
IRON SATN MFR SERPL: 23 % (ref 20–50)
IRON SERPL-MCNC: 79 UG/DL (ref 30–160)
LYMPHOCYTES # BLD AUTO: 0.87 K/UL (ref 1–4.8)
MAGNESIUM SERPL-MCNC: 1.5 MG/DL (ref 1.6–2.6)
MCH RBC QN AUTO: 33.5 PG (ref 27–31)
MCHC RBC AUTO-ENTMCNC: 32.9 G/DL (ref 32–36)
MCV RBC AUTO: 102 FL (ref 82–98)
NUCLEATED RBC (/100WBC) (OHS): 0 /100 WBC
PLATELET # BLD AUTO: 292 K/UL (ref 150–450)
PMV BLD AUTO: 8.6 FL (ref 9.2–12.9)
POTASSIUM SERPL-SCNC: 3.5 MMOL/L (ref 3.5–5.1)
PROT SERPL-MCNC: 7.5 GM/DL (ref 6–8.4)
RBC # BLD AUTO: 3.22 M/UL (ref 4–5.4)
RELATIVE EOSINOPHIL (OHS): 1.8 %
RELATIVE LYMPHOCYTE (OHS): 25.5 % (ref 18–48)
RELATIVE MONOCYTE (OHS): 13.8 % (ref 4–15)
RELATIVE NEUTROPHIL (OHS): 58 % (ref 38–73)
SODIUM SERPL-SCNC: 132 MMOL/L (ref 136–145)
TIBC SERPL-MCNC: 348 UG/DL (ref 250–450)
TRANSFERRIN SERPL-MCNC: 235 MG/DL (ref 200–375)
WBC # BLD AUTO: 3.41 K/UL (ref 3.9–12.7)

## 2025-04-25 PROCEDURE — 82728 ASSAY OF FERRITIN: CPT | Mod: HCNC

## 2025-04-25 PROCEDURE — 83540 ASSAY OF IRON: CPT | Mod: HCNC

## 2025-04-25 PROCEDURE — 63600175 PHARM REV CODE 636 W HCPCS: Mod: JZ,EC,TB,HCNC | Performed by: INTERNAL MEDICINE

## 2025-04-25 PROCEDURE — 82040 ASSAY OF SERUM ALBUMIN: CPT | Mod: HCNC

## 2025-04-25 PROCEDURE — 85025 COMPLETE CBC W/AUTO DIFF WBC: CPT | Mod: HCNC

## 2025-04-25 PROCEDURE — 36415 COLL VENOUS BLD VENIPUNCTURE: CPT | Mod: HCNC

## 2025-04-25 PROCEDURE — 96372 THER/PROPH/DIAG INJ SC/IM: CPT | Mod: HCNC

## 2025-04-25 PROCEDURE — 83735 ASSAY OF MAGNESIUM: CPT | Mod: HCNC

## 2025-04-25 RX ADMIN — EPOETIN ALFA-EPBX 40000 UNITS: 40000 INJECTION, SOLUTION INTRAVENOUS; SUBCUTANEOUS at 11:04

## 2025-04-25 NOTE — PLAN OF CARE
Pt arrived to the Infusion unit via wheelchair for maintenance injection (q2w). Pt is alert and oriented x4. Reports no new allergies, symptoms, or concerns at this time. Pt consents to plan of care for today. Hx of CKD. Labs drawn pta: CBC - Hgb 10.8. (Hold @ 11.0). Pt administered Retacrit 40k SubQ in (R) upper arm. Pt tolerated medication well with no adverse reactions. Pt given calendar with scheduled appointments and verbalizes no additional needs at this time. Pt discharged via wheelchair with family in no acute distress.    Problem: Chronic Kidney Disease  Goal: Absence of Anemia Signs and Symptoms  Outcome: Progressing     Problem: Fall Injury Risk  Goal: Absence of Fall and Fall-Related Injury  Outcome: Met

## 2025-04-29 ENCOUNTER — APPOINTMENT (OUTPATIENT)
Dept: RADIOLOGY | Facility: HOSPITAL | Age: 80
End: 2025-04-29
Payer: MEDICARE

## 2025-04-29 ENCOUNTER — RESULTS FOLLOW-UP (OUTPATIENT)
Dept: FAMILY MEDICINE | Facility: CLINIC | Age: 80
End: 2025-04-29

## 2025-04-29 ENCOUNTER — OFFICE VISIT (OUTPATIENT)
Dept: FAMILY MEDICINE | Facility: CLINIC | Age: 80
End: 2025-04-29
Payer: MEDICARE

## 2025-04-29 VITALS
HEART RATE: 102 BPM | OXYGEN SATURATION: 99 % | BODY MASS INDEX: 27.5 KG/M2 | TEMPERATURE: 98 F | DIASTOLIC BLOOD PRESSURE: 74 MMHG | SYSTOLIC BLOOD PRESSURE: 136 MMHG | WEIGHT: 150.38 LBS | RESPIRATION RATE: 18 BRPM

## 2025-04-29 DIAGNOSIS — M79.642 PAIN OF LEFT HAND: ICD-10-CM

## 2025-04-29 DIAGNOSIS — M79.642 BILATERAL HAND PAIN: ICD-10-CM

## 2025-04-29 DIAGNOSIS — N28.1 RENAL CYST: ICD-10-CM

## 2025-04-29 DIAGNOSIS — D86.86 SARCOID ARTHROPATHY: ICD-10-CM

## 2025-04-29 DIAGNOSIS — G72.9 MYOPATHY: Primary | ICD-10-CM

## 2025-04-29 DIAGNOSIS — F03.90 DEMENTIA, UNSPECIFIED DEMENTIA SEVERITY, UNSPECIFIED DEMENTIA TYPE, UNSPECIFIED WHETHER BEHAVIORAL, PSYCHOTIC, OR MOOD DISTURBANCE OR ANXIETY: ICD-10-CM

## 2025-04-29 DIAGNOSIS — D86.9 SARCOIDOSIS: ICD-10-CM

## 2025-04-29 DIAGNOSIS — M79.641 BILATERAL HAND PAIN: ICD-10-CM

## 2025-04-29 DIAGNOSIS — I27.20 PULMONARY HYPERTENSION, UNSPECIFIED: ICD-10-CM

## 2025-04-29 DIAGNOSIS — J44.9 CHRONIC OBSTRUCTIVE PULMONARY DISEASE, UNSPECIFIED COPD TYPE: ICD-10-CM

## 2025-04-29 PROCEDURE — 73130 X-RAY EXAM OF HAND: CPT | Mod: 26,50,HCNC, | Performed by: RADIOLOGY

## 2025-04-29 PROCEDURE — 3078F DIAST BP <80 MM HG: CPT | Mod: CPTII,HCNC,S$GLB,

## 2025-04-29 PROCEDURE — 1101F PT FALLS ASSESS-DOCD LE1/YR: CPT | Mod: CPTII,HCNC,S$GLB,

## 2025-04-29 PROCEDURE — 1125F AMNT PAIN NOTED PAIN PRSNT: CPT | Mod: CPTII,HCNC,S$GLB,

## 2025-04-29 PROCEDURE — 73130 X-RAY EXAM OF HAND: CPT | Mod: TC,50,HCNC,FY,PN

## 2025-04-29 PROCEDURE — 3288F FALL RISK ASSESSMENT DOCD: CPT | Mod: CPTII,HCNC,S$GLB,

## 2025-04-29 PROCEDURE — 99999 PR PBB SHADOW E&M-EST. PATIENT-LVL IV: CPT | Mod: PBBFAC,HCNC,,

## 2025-04-29 PROCEDURE — 1157F ADVNC CARE PLAN IN RCRD: CPT | Mod: CPTII,HCNC,S$GLB,

## 2025-04-29 PROCEDURE — 3075F SYST BP GE 130 - 139MM HG: CPT | Mod: CPTII,HCNC,S$GLB,

## 2025-04-29 PROCEDURE — 99214 OFFICE O/P EST MOD 30 MIN: CPT | Mod: HCNC,S$GLB,,

## 2025-04-29 RX ORDER — PREDNISONE 5 MG/1
5 TABLET ORAL DAILY
Qty: 5 TABLET | Refills: 0 | Status: SHIPPED | OUTPATIENT
Start: 2025-04-29 | End: 2025-05-04

## 2025-04-29 NOTE — PROGRESS NOTES
HPI     Regino Lawrence is a 79 y.o. female with multiple medical diagnoses as listed in the medical history and problem list that presents for   Chief Complaint   Patient presents with    Abdominal Pain    Sinus Problem    Leg Pain    Headache     C/o h/am L.hand/fingers and LLE sharp excruciating pain with numbness       HPI  Pt presents today w/ daughter and on her electric scooter for routine f/u. She has a hx of myopathy, sarcoid arthropathy, chronic steroid use, hx of PE on Eliquis, chronic pain. She reports her left hand is still weak and is in significant pain. She last f/u with rheumatology last year, however, has not followed up with them since. She is currently on low dose prednisone daily (1 mg) and takes Tizanidine, Lyrica, Tylenol for her pain. She feels like her pain is minimally relieved. She feels like her left hand pain has been worsening moreso than usual and is barely able to  things/close fist properly.  She's also been experiencing daily headaches as well, recently diagnosed with aneurysm (3.5 x 2.6 x 3.9 mm outpouching of the right A1 CHLOE or anterior communicating artery is suspicious for an intracranial aneurysm) - has not followed up w/neurology for this      Assessment & Plan     1. Myopathy  - No recent autoimmune labs - will do basic and refer back out to rheumatology team for continue care  - Pt to continue with current medication management and will do short course of prednisone boost (5 mg for 5 days)    - WILMER Screen w/Reflex; Future  - Cyclic Citrullinated Peptide Antibody, IgG; Future  - Rheumatoid Factor; Future  - CK; Future  - Sedimentation rate; Future  - Ambulatory referral/consult to Rheumatology; Future  - predniSONE (DELTASONE) 5 MG tablet; Take 1 tablet (5 mg total) by mouth once daily. for 5 days  Dispense: 5 tablet; Refill: 0    2. Pain of left hand  - Reviewed 2024 x-ray regarding arthritis - will do repeat to ensure not worsening given her decreased ROM  - Pt to  refer back out to hand surgery to consider EMG as noted on previous notes    - Ambulatory referral/consult to Hand Surgery; Future  - X-Ray Hand 3 View Bilateral; Future    3. Bilateral hand pain  - Reviewed 2024 x-ray regarding arthritis - will do repeat to ensure not worsening given her decreased ROM  - Pt to refer back out to hand surgery to consider EMG as noted on previous notes    - X-Ray Hand 3 View Bilateral; Future  - predniSONE (DELTASONE) 5 MG tablet; Take 1 tablet (5 mg total) by mouth once daily. for 5 days  Dispense: 5 tablet; Refill: 0    4. Sarcoidosis  - No new cough/chest pain/chest tightness.  - No recent autoimmune labs - will do basic and refer back out to rheumatology team for continue care  - Pt to continue with current medication management and will do short course of prednisone boost (5 mg for 5 days)    - WILMER Screen w/Reflex; Future  - Cyclic Citrullinated Peptide Antibody, IgG; Future  - Rheumatoid Factor; Future  - CK; Future  - Sedimentation rate; Future  - Ambulatory referral/consult to Rheumatology; Future  - predniSONE (DELTASONE) 5 MG tablet; Take 1 tablet (5 mg total) by mouth once daily. for 5 days  Dispense: 5 tablet; Refill: 0    5. Renal cyst  - Endorsing right flank pain, which is chronic for her. Will refer out to urology for further management.    - Ambulatory referral/consult to Urology; Future    6. Sarcoid arthropathy  - No recent autoimmune labs - will do basic and refer back out to rheumatology team for continue care  - Pt to continue with current medication management and will do short course of prednisone boost (5 mg for 5 days)    7. Dementia, unspecified dementia severity, unspecified dementia type, unspecified whether behavioral, psychotic, or mood disturbance or anxiety  - A&O x 3 today, continue with current regimen    8. Chronic obstructive pulmonary disease, unspecified COPD type  - No new cough, lungs clear on exam, not in respiratory distress    9. Pulmonary  hypertension, unspecified  - Blood pressure today initially elevated but decreased on repeat in clinic. stable, continue with Carvedilol, Nifedipine  - Recommend low-sodium diet and compliance with blood pressure medication.  - Keep blood pressure goal <140/90 and f/u with clinic if persistently elevated      --------------------------------------------    Health Maintenance         Date Due Completion Date    Shingles Vaccine (1 of 2) 07/20/2015 5/25/2015    RSV Vaccine (Age 60+ and Pregnant patients) (1 - 1-dose 75+ series) Never done ---    COVID-19 Vaccine (7 - 2024-25 season) 12/13/2024 10/18/2024    Hemoglobin A1c 07/25/2025 1/25/2025    Diabetes Urine Screening 01/25/2026 1/25/2025    Diabetic Eye Exam 01/31/2026 1/31/2025    Lipid Panel 03/24/2026 3/24/2025    TETANUS VACCINE 05/16/2026 5/16/2016    DEXA Scan 11/08/2026 11/8/2024            Health maintenance reviewed    Follow Up:  Follow up in about 3 months (around 7/29/2025).    Exam     Review of Systems:  (as noted above)  Review of Systems   Constitutional:  Negative for chills and fever.   HENT:  Negative for congestion.    Respiratory:  Negative for chest tightness, shortness of breath and wheezing.    Cardiovascular:  Negative for chest pain, palpitations and leg swelling.   Genitourinary:  Positive for flank pain. Negative for hematuria and urgency.   Musculoskeletal:  Positive for arthralgias, back pain, gait problem, joint swelling and myalgias.   Neurological:  Positive for headaches.       Physical Exam  Constitutional:       General: She is not in acute distress.     Appearance: Normal appearance. She is not ill-appearing.   Cardiovascular:      Rate and Rhythm: Normal rate and regular rhythm.      Pulses: Normal pulses.      Heart sounds: Normal heart sounds. No murmur heard.     No friction rub. No gallop.   Pulmonary:      Effort: Pulmonary effort is normal. No respiratory distress.      Breath sounds: Normal breath sounds. No wheezing,  rhonchi or rales.   Musculoskeletal:      Right hand: No bony tenderness. Normal range of motion. Normal strength. Normal capillary refill. Normal pulse.      Left hand: Tenderness and bony tenderness present. Decreased range of motion. Decreased strength. Normal sensation. Normal capillary refill. Normal pulse.   Skin:     General: Skin is warm and dry.      Capillary Refill: Capillary refill takes less than 2 seconds.   Neurological:      General: No focal deficit present.      Mental Status: She is alert and oriented to person, place, and time.   Psychiatric:         Mood and Affect: Mood normal.         Behavior: Behavior normal.       Vitals:    04/29/25 1059 04/29/25 1141   BP: (!) 140/62 136/74   BP Location: Right arm    Patient Position: Sitting    Pulse: 102    Resp: 18    Temp: 98 °F (36.7 °C)    TempSrc: Oral    SpO2: 99%    Weight: 68.2 kg (150 lb 5.7 oz)       Body mass index is 27.5 kg/m².        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, without long-term current use of insulin     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, without long-term current use of insulin     Dementia, unspecified dementia severity, unspecified dementia type, unspecified whether behavioral, psychotic, or mood disturbance or anxiety 4/29/2025    Depression     Diabetes mellitus, type 2     Eye injury as a child     k-abrasion  od    Hyperlipidemia     Hypertension     Hypothyroidism     Immune deficiency disorder     Immune disorder     LOC (loss of consciousness) 03/12/2021    at home    Myalgia and myositis 09/06/2012    Osteoporosis     Polyneuropathy     Pulmonary embolism 07/10/2021     Renal manifestation of secondary diabetes mellitus     Sarcoidosis     Seizure     Type 2 diabetes mellitus     Ulcer     no cancer    Urinary incontinence        Family History   Problem Relation Name Age of Onset    Hypertension Mother Martial     Cataracts Mother Martial     No Known Problems Father      Hypertension Maternal Grandmother Nora     Glaucoma Sister Cristina     Arthritis Sister Cristina     No Known Problems Brother Kofi     No Known Problems Maternal Aunt      No Known Problems Maternal Uncle      No Known Problems Paternal Aunt      No Known Problems Paternal Uncle      No Known Problems Maternal Grandfather      No Known Problems Paternal Grandmother      No Known Problems Paternal Grandfather      Kidney failure Sister Rita     Hepatitis Sister Deepali     Cancer Sister Deepali         bone cancer     Immunodeficiency Sister Christiana     Diabetes Son x4     Hypertension Son x4     Lupus Neg Hx      Rheum arthritis Neg Hx      Amblyopia Neg Hx      Blindness Neg Hx      Macular degeneration Neg Hx      Retinal detachment Neg Hx      Strabismus Neg Hx      Stroke Neg Hx      Thyroid disease Neg Hx      Endometrial cancer Neg Hx      Vaginal cancer Neg Hx      Cervical cancer Neg Hx         Allergies and Medications: (updated and reviewed)  Review of patient's allergies indicates:   Allergen Reactions    Azathioprine Shortness Of Breath and Other (See Comments)     Fatigue     Current Medications[1]    Patient Care Team:  Micaela Mendoza MD as PCP - General (Internal Medicine)  Leti Ruggiero MD as Consulting Physician (Rheumatology)  Edith Pacheco MD as Consulting Physician (Hematology and Oncology)  Elio Azevedo MD as Consulting Physician (Ophthalmology)  Joanie Belcher DPM as Consulting Physician (Podiatry)  Alfonso Richards MD as Consulting Physician (Pain Medicine)  Jenae Teixeira LPN as Licensed Practical Nurse  Medicare, [employer] Managed as Hypertension Digital  Medicine Contract  Medicare, [employer] Managed as Diabetes Digital Medicine Contract  Micaela Mendoza MD as Hypertension Digital Medicine Responsible Provider (Family Medicine)  Micaela Mendoza MD as Diabetes Digital Medicine Responsible Provider (Family Medicine)  Geovanny Ruiz, PharmJA as Diabetes Digital Medicine Clinician (Pharmacist)  Geovanny Ruiz, ClovisD as Hypertension Digital Medicine Clinician (Pharmacist)         - The patient is given an After Visit Summary that lists all medications with directions, allergies, education, orders placed during this encounter and follow-up instructions.      - I have reviewed the patient's medical information including past medical and family history sections including the medications and allergies.      - We discussed the patient's current medications.   This note was generated with the assistance of ambient listening technology. Verbal consent was obtained by the patient and accompanying visitor(s) for the recording of patient appointment to facilitate this note. I attest to having reviewed and edited the generated note for accuracy, though some syntax or spelling errors may persist. Please contact the author of this note for any clarification.          Darnell Burris NP                      [1]   Current Outpatient Medications   Medication Sig Dispense Refill    (Magic mouthwash) 1:1:1 diphenhydrAMINE(Benadryl) 12.5mg/5ml liq, aluminum & magnesium hydroxide-simethicone (Maalox), LIDOcaine viscous 2% Swish and spit 5 mLs every 4 (four) hours as needed (mouth pain). for mouth sores 150 mL 0    ACCU-CHEK FASTCLIX LANCING DEV Kit USE AS DIRECTED. 1 each 0    albuterol-ipratropium (DUO-NEB) 2.5 mg-0.5 mg/3 mL nebulizer solution Take 3 mLs by nebulization every 4 (four) hours as needed for Wheezing or Shortness of Breath. Rescue 90 each 11    alpha lipoic acid 600 mg Cap Take 600 mg by mouth once daily. 60 each 3    apixaban (ELIQUIS) 5 mg Tab Take 1 tablet (5 mg total)  by mouth 2 (two) times daily. Start this prescription after finishing starter pack 60 tablet 5    atorvastatin (LIPITOR) 20 MG tablet Take 1 tablet (20 mg total) by mouth once daily. 90 tablet 3    blood sugar diagnostic (ACCU-CHEK SMARTVIEW TEST STRIP) Strp Use as directed to check blood sugar twice daily. 200 strip 3    blood sugar diagnostic Strp To check BG three times daily, to use with insurance preferred meter 300 each 3    blood-glucose meter kit Use as instructed 1 each 0    calcium carb/vit D3/minerals (CALCIUM-VITAMIN D ORAL) Take 1 tablet by mouth once daily.      carvediloL (COREG) 25 MG tablet Take 1 tablet (25 mg total) by mouth 2 (two) times daily. 180 tablet 3    cetirizine (ZYRTEC) 10 MG tablet Take 1 tablet (10 mg total) by mouth once daily. 30 tablet 3    clotrimazole (LOTRIMIN) 1 % cream Apply topically 2 (two) times daily. 45 g 0    cyanocobalamin, vitamin B-12, (VITAMIN B-12 ORAL) Take 1 tablet by mouth once daily.      diclofenac sodium (VOLTAREN) 1 % Gel Apply 2 g topically once daily. 100 g 3    DULoxetine (CYMBALTA) 60 MG capsule Take 1 capsule (60 mg total) by mouth once daily. 90 capsule 1    fenofibrate 160 MG Tab Take 1 tablet by mouth once daily 90 tablet 1    fluticasone propion-salmeterol 115-21 mcg/dose (ADVAIR HFA) 115-21 mcg/actuation HFAA inhaler Inhale 2 puffs into the lungs every 12 (twelve) hours. Controller 12 g 3    folic acid (FOLVITE) 1 MG tablet Take 1 tablet (1,000 mcg total) by mouth once daily. 90 tablet 1    insulin glargine U-100, Lantus, (LANTUS SOLOSTAR U-100 INSULIN) 100 unit/mL (3 mL) InPn pen Inject 30 Units into the skin 2 (two) times a day. 54 mL 1    levETIRAcetam (KEPPRA) 750 MG Tab Take 1 tablet (750 mg total) by mouth 2 (two) times daily. 60 tablet 11    levothyroxine (SYNTHROID) 50 MCG tablet Take 1 tablet (50 mcg total) by mouth before breakfast. 90 tablet 3    linaCLOtide (LINZESS) 72 mcg Cap capsule Take 1 capsule (72 mcg total) by mouth before  "breakfast. 90 capsule 3    magnesium 200 mg Tab 1 pill by mouth daily 4 each 0    memantine (NAMENDA) 5 MG Tab Take 1 tablet (5 mg total) by mouth 2 (two) times daily. 60 tablet 11    NIFEdipine (PROCARDIA-XL) 60 MG (OSM) 24 hr tablet Take 1 tablet (60 mg total) by mouth 2 (two) times a day. 180 tablet 1    pantoprazole (PROTONIX) 40 MG tablet Take 1 tablet (40 mg total) by mouth once daily. 90 tablet 3    pen needle, diabetic (BD ULTRA-FINE ANA PEN NEEDLE) 32 gauge x 5/32" Ndle For use with insulin pen 100 each 3    potassium chloride SA (K-DUR,KLOR-CON M) 10 MEQ tablet Take 1 tablet (10 mEq total) by mouth 2 (two) times daily. 4 tablet 0    predniSONE (DELTASONE) 1 MG tablet TAKE 4 TABLETS BY MOUTH ONCE DAILY FOR  ONE  MONTH  THEN  DECREASE  BY  1  TABLET  EVERY  MONTH  IF  LABS  ALLOW (Patient taking differently: TAKE 4 TABLETS BY MOUTH ONCE DAILY FOR  ONE  MONTH  THEN  DECREASE  BY  1  TABLET  EVERY  MONTH  IF  LABS  ALLOW) 120 tablet 2    predniSONE (DELTASONE) 5 MG tablet Take 1 tablet (5 mg total) by mouth once daily. for 5 days 5 tablet 0    pregabalin (LYRICA) 75 MG capsule Take 1 capsule (75 mg total) by mouth 2 (two) times daily. 60 capsule 5    tiZANidine (ZANAFLEX) 2 MG tablet Take 2 tablets (4 mg total) by mouth every 8 (eight) hours as needed (muscle spasm). 270 tablet 1     Current Facility-Administered Medications   Medication Dose Route Frequency Provider Last Rate Last Admin    denosumab (PROLIA) injection 60 mg  60 mg Subcutaneous Q6 Months    60 mg at 12/23/24 1048     "

## 2025-04-30 ENCOUNTER — RESULTS FOLLOW-UP (OUTPATIENT)
Dept: FAMILY MEDICINE | Facility: CLINIC | Age: 80
End: 2025-04-30

## 2025-04-30 ENCOUNTER — TELEPHONE (OUTPATIENT)
Dept: GASTROENTEROLOGY | Facility: CLINIC | Age: 80
End: 2025-04-30
Payer: MEDICARE

## 2025-04-30 DIAGNOSIS — D86.9 SARCOIDOSIS: ICD-10-CM

## 2025-04-30 RX ORDER — DULOXETIN HYDROCHLORIDE 60 MG/1
60 CAPSULE, DELAYED RELEASE ORAL DAILY
Qty: 90 CAPSULE | Refills: 3 | Status: SHIPPED | OUTPATIENT
Start: 2025-04-30

## 2025-04-30 NOTE — TELEPHONE ENCOUNTER
Provider Staff:  Action required for this patient    Requires labs      Please see care gap opportunities below in Care Due Message.    Thanks!  [facility]     Appointments      Date Provider   Last Visit   3/29/2025 Micaela Mendoza MD   Next Visit   7/30/2025 Micaela Mendoza MD     Refill Decision Note   Regino Lawrence  is requesting a refill authorization.  Brief Assessment and Rationale for Refill:  Approve     Medication Therapy Plan:         Comments:     Note composed:11:37 AM 04/30/2025

## 2025-04-30 NOTE — TELEPHONE ENCOUNTER
----- Message from Kelli Bolivar PA-C sent at 4/30/2025 11:04 AM CDT -----  Please let her know order was sent again and advise if they don't hear from schedulers within a week to let us know. I would prefer to see her after scopes but she can keep the appointment if she wants.  ----- Message -----  From: Tori Ann MA  Sent: 4/30/2025  10:57 AM CDT  To: Kelli Bolivar PA-C    Please review/advise, thank you.Patient is scheduled for her 3mo follow up on 5/19, daughter states that they have not received any calls from the schedulers for the procedures.Daughter would like to know if she needs the procedures prior to the follow up?  ----- Message -----  From: Opal Aaron  Sent: 4/30/2025  10:49 AM CDT  To: [facility] Staff    .Type: Patient Call BackWho called: Maynor - daughter What is the request in detail: Calling about test she need before upcoming appointment. Ask that the nurse give her a call.Can the clinic reply by [facility]? No Would the patient rather a call back or a response via [facility]? Call [facility] call back number: [phone] Information:

## 2025-04-30 NOTE — TELEPHONE ENCOUNTER
MA called/spoke with patients daughter. Let her know that Miss Pereira sent the orders again for the procedures and that if they do not here anything within a week to call the office. Also let her know that Miss Pereira would like to see Miss Lacy after the procedures are done but they certainly can keep the upcoming appointment if they would like.    Patients daughter verbalized understanding.

## 2025-04-30 NOTE — TELEPHONE ENCOUNTER
Care Due:                  Date            Visit Type   Department     Provider  --------------------------------------------------------------------------------                                             [facility]     MEDICINE /  Last Visit: 03-      FOLLOW UP    BENEDICT GUERRERO -         [facility]      MEDICINE /  Next Visit: 07-      CARE ([facility])   INTERNAL [place]  Test          Frequency    Reason                     Performed    Due Date  --------------------------------------------------------------------------------    HBA1C.......  6 months...  insulin..................  01- 07-    [employer] Embedded Care Due Messages. Reference number: [id number].   4/30/2025 10:54:06 AM REINAT

## 2025-05-02 ENCOUNTER — PATIENT MESSAGE (OUTPATIENT)
Dept: RHEUMATOLOGY | Facility: CLINIC | Age: 80
End: 2025-05-02
Payer: MEDICARE

## 2025-05-03 DIAGNOSIS — D86.9 SARCOIDOSIS: Chronic | ICD-10-CM

## 2025-05-03 DIAGNOSIS — G72.9 MYOPATHY: ICD-10-CM

## 2025-05-03 DIAGNOSIS — M79.10 MYALGIA: ICD-10-CM

## 2025-05-04 NOTE — TELEPHONE ENCOUNTER
No care due was identified.  [facility] Embedded Care Due Messages. Reference number: [id number].   5/03/2025 7:35:28 PM CDT

## 2025-05-05 RX ORDER — FOLIC ACID 1 MG/1
1000 TABLET ORAL DAILY
Qty: 90 TABLET | Refills: 1 | Status: SHIPPED | OUTPATIENT
Start: 2025-05-05

## 2025-05-05 RX ORDER — TIZANIDINE 2 MG/1
4 TABLET ORAL EVERY 8 HOURS PRN
Qty: 270 TABLET | Refills: 1 | Status: SHIPPED | OUTPATIENT
Start: 2025-05-05

## 2025-05-05 NOTE — TELEPHONE ENCOUNTER
Refill Routing Note   Medication(s) are not appropriate for processing by [facility] for the following reason(s):        Outside of protocol    [facility] action(s):  Route               Appointments  past 12m or future 3m with PCP    Date Provider   Last Visit   3/29/2025 Micaela Mendoza MD   Next Visit   7/30/2025 Micaela Mendoza MD   ED visits in past 90 days: 0        Note composed:7:39 AM 05/05/2025            The patient is a 54y Male complaining of shortness of breath.

## 2025-05-12 ENCOUNTER — INFUSION (OUTPATIENT)
Dept: INFUSION THERAPY | Facility: HOSPITAL | Age: 80
End: 2025-05-12
Attending: INTERNAL MEDICINE
Payer: MEDICARE

## 2025-05-12 ENCOUNTER — LAB VISIT (OUTPATIENT)
Dept: LAB | Facility: HOSPITAL | Age: 80
End: 2025-05-12
Attending: INTERNAL MEDICINE
Payer: MEDICARE

## 2025-05-12 VITALS
HEART RATE: 85 BPM | OXYGEN SATURATION: 100 % | DIASTOLIC BLOOD PRESSURE: 64 MMHG | TEMPERATURE: 98 F | SYSTOLIC BLOOD PRESSURE: 140 MMHG | RESPIRATION RATE: 18 BRPM

## 2025-05-12 DIAGNOSIS — N18.30 ANEMIA IN STAGE 3 CHRONIC KIDNEY DISEASE: Primary | ICD-10-CM

## 2025-05-12 DIAGNOSIS — D63.1 ANEMIA IN STAGE 3A CHRONIC KIDNEY DISEASE: ICD-10-CM

## 2025-05-12 DIAGNOSIS — N18.9 ANEMIA IN CHRONIC KIDNEY DISEASE, UNSPECIFIED CKD STAGE: ICD-10-CM

## 2025-05-12 DIAGNOSIS — N18.31 ANEMIA IN STAGE 3A CHRONIC KIDNEY DISEASE: ICD-10-CM

## 2025-05-12 DIAGNOSIS — D50.9 IRON DEFICIENCY ANEMIA, UNSPECIFIED IRON DEFICIENCY ANEMIA TYPE: ICD-10-CM

## 2025-05-12 DIAGNOSIS — D63.1 ANEMIA IN CHRONIC KIDNEY DISEASE, UNSPECIFIED CKD STAGE: ICD-10-CM

## 2025-05-12 DIAGNOSIS — D63.1 ANEMIA IN STAGE 3 CHRONIC KIDNEY DISEASE: Primary | ICD-10-CM

## 2025-05-12 DIAGNOSIS — Z53.1 PROCEDURE AND TREATMENT NOT CARRIED OUT BECAUSE OF PATIENT'S DECISION FOR REASONS OF BELIEF AND GROUP PRESSURE: ICD-10-CM

## 2025-05-12 LAB
ABSOLUTE EOSINOPHIL (OHS): 0.06 K/UL
ABSOLUTE MONOCYTE (OHS): 0.61 K/UL (ref 0.3–1)
ABSOLUTE NEUTROPHIL COUNT (OHS): 2.28 K/UL (ref 1.8–7.7)
BASOPHILS # BLD AUTO: 0.02 K/UL
BASOPHILS NFR BLD AUTO: 0.5 %
ERYTHROCYTE [DISTWIDTH] IN BLOOD BY AUTOMATED COUNT: 14 % (ref 11.5–14.5)
FERRITIN SERPL-MCNC: 553.6 NG/ML (ref 20–300)
HCT VFR BLD AUTO: 31.5 % (ref 37–48.5)
HGB BLD-MCNC: 10.3 GM/DL (ref 12–16)
IMM GRANULOCYTES # BLD AUTO: 0.01 K/UL (ref 0–0.04)
IMM GRANULOCYTES NFR BLD AUTO: 0.3 % (ref 0–0.5)
IRON SATN MFR SERPL: 25 % (ref 20–50)
IRON SERPL-MCNC: 81 UG/DL (ref 30–160)
LYMPHOCYTES # BLD AUTO: 0.94 K/UL (ref 1–4.8)
MCH RBC QN AUTO: 33.8 PG (ref 27–31)
MCHC RBC AUTO-ENTMCNC: 32.7 G/DL (ref 32–36)
MCV RBC AUTO: 103 FL (ref 82–98)
NUCLEATED RBC (/100WBC) (OHS): 0 /100 WBC
PLATELET # BLD AUTO: 260 K/UL (ref 150–450)
PMV BLD AUTO: 8.3 FL (ref 9.2–12.9)
RBC # BLD AUTO: 3.05 M/UL (ref 4–5.4)
RELATIVE EOSINOPHIL (OHS): 1.5 %
RELATIVE LYMPHOCYTE (OHS): 24 % (ref 18–48)
RELATIVE MONOCYTE (OHS): 15.6 % (ref 4–15)
RELATIVE NEUTROPHIL (OHS): 58.1 % (ref 38–73)
TIBC SERPL-MCNC: 320 UG/DL (ref 250–450)
TRANSFERRIN SERPL-MCNC: 216 MG/DL (ref 200–375)
WBC # BLD AUTO: 3.92 K/UL (ref 3.9–12.7)

## 2025-05-12 PROCEDURE — 36415 COLL VENOUS BLD VENIPUNCTURE: CPT

## 2025-05-12 PROCEDURE — 96372 THER/PROPH/DIAG INJ SC/IM: CPT

## 2025-05-12 PROCEDURE — 63600175 PHARM REV CODE 636 W HCPCS: Mod: JZ,EC,TB | Performed by: INTERNAL MEDICINE

## 2025-05-12 PROCEDURE — 85025 COMPLETE CBC W/AUTO DIFF WBC: CPT

## 2025-05-12 PROCEDURE — 84466 ASSAY OF TRANSFERRIN: CPT

## 2025-05-12 PROCEDURE — 82728 ASSAY OF FERRITIN: CPT

## 2025-05-12 RX ADMIN — EPOETIN ALFA-EPBX 40000 UNITS: 40000 INJECTION, SOLUTION INTRAVENOUS; SUBCUTANEOUS at 01:05

## 2025-05-12 NOTE — PLAN OF CARE
Patient presented to unit for q2w Retacrit 40k injection. VSS. No new or worsening complaints voiced. Labs reviewed. Injection administered and tolerated without difficulty. Appointment calendar provided. Patient discharged in NAD.     Problem: Chronic Kidney Disease  Goal: Optimal Coping with Chronic Illness  Outcome: Progressing  Goal: Electrolyte Balance  Outcome: Progressing  Goal: Fluid Balance  Outcome: Progressing  Goal: Optimal Functional Ability  Outcome: Progressing  Goal: Absence of Anemia Signs and Symptoms  Outcome: Progressing  Goal: Optimal Oral Intake  Outcome: Progressing  Goal: Acceptable Pain Control  Outcome: Progressing  Goal: Minimize Renal Failure Effects  Outcome: Progressing

## 2025-05-15 NOTE — TELEPHONE ENCOUNTER
Refill Routing Note   Medication(s) are not appropriate for processing by [facility] for the following reason(s):        Required labs outdated    [facility] action(s):  Defer     Requires labs : Yes             Appointments  past 12m or future 3m with PCP    Date Provider   Last Visit   7/10/2024 Micaela Mendoza MD   Next Visit   10/10/2024 Micaela Mendoza MD   ED visits in past 90 days: 0        Note composed:2:01 PM 09/17/2024           POST-OP DIAGNOSIS:  Ganglion cyst of volar aspect of left wrist 15-May-2025 07:59:24  Karlee Gatica

## 2025-05-19 ENCOUNTER — OFFICE VISIT (OUTPATIENT)
Dept: GASTROENTEROLOGY | Facility: CLINIC | Age: 80
End: 2025-05-19
Payer: MEDICARE

## 2025-05-19 VITALS
BODY MASS INDEX: 27.77 KG/M2 | DIASTOLIC BLOOD PRESSURE: 76 MMHG | HEART RATE: 96 BPM | WEIGHT: 150.88 LBS | HEIGHT: 62 IN | SYSTOLIC BLOOD PRESSURE: 150 MMHG

## 2025-05-19 DIAGNOSIS — K59.04 CHRONIC IDIOPATHIC CONSTIPATION: Primary | ICD-10-CM

## 2025-05-19 PROCEDURE — 99213 OFFICE O/P EST LOW 20 MIN: CPT | Mod: S$GLB,,,

## 2025-05-19 PROCEDURE — 99999 PR PBB SHADOW E&M-EST. PATIENT-LVL IV: CPT | Mod: PBBFAC,,,

## 2025-05-19 PROCEDURE — 3288F FALL RISK ASSESSMENT DOCD: CPT | Mod: CPTII,S$GLB,,

## 2025-05-19 PROCEDURE — 3077F SYST BP >= 140 MM HG: CPT | Mod: CPTII,S$GLB,,

## 2025-05-19 PROCEDURE — 3078F DIAST BP <80 MM HG: CPT | Mod: CPTII,S$GLB,,

## 2025-05-19 PROCEDURE — 1101F PT FALLS ASSESS-DOCD LE1/YR: CPT | Mod: CPTII,S$GLB,,

## 2025-05-19 PROCEDURE — 1157F ADVNC CARE PLAN IN RCRD: CPT | Mod: CPTII,S$GLB,,

## 2025-05-19 PROCEDURE — 1159F MED LIST DOCD IN RCRD: CPT | Mod: CPTII,S$GLB,,

## 2025-05-19 NOTE — PROGRESS NOTES
"    [facility] Follow-UP Note    Reason for Follow-Up:  The encounter diagnosis was Chronic idiopathic constipation.    PCP:   Micaela Mendoza         HPI:  This is a 79 y.o. female last seen in GI clinic on 2/17/2025 for evaluation of chronic constipation, generalized abdominal pain and bloating. States she was previously taking Linzess 72 mcg every 2 days with daily BM. However, she had to discontinue about a year ago because her insurance no longer covered the drug. She has since been taking Senna and Colace with minimal improvement. States she can go days-weeks between BM. Her LBM was 2 days ago and hard, pebble-like stool. Associated with reduced appetite and some solid food dysphagia. Pt had wanted to proceed with both EGD and colonoscopy  At last visit, I represcribed linzess. In the meantime, had recommended Miralax, fiber and Ibgard.     Interval History:  Today, pt presents for follow up. She reports taking Linzess 72 mcg once a week with normal, formed BM about 3x/week. States when she took it daily, she had diarrhea. Denies straining, loose stools, bloody stools, rectal bleeding, black stools, or abdominal pain. Her dysphagia resolved. Reflux is mostly controlled with diet, she takes Protonix PRN. Denies  N/V.    Objective Findings:    Vital Signs:  BP (!) 150/76   Pulse 96   Ht 5' 2" (1.575 m)   Wt 68.5 kg (150 lb 14.5 oz)   LMP  (LMP Unknown)   BMI 27.60 kg/m²   Body mass index is 27.6 kg/m².    Physical Exam:  General Appearance: Well appearing in no acute distress  Abdomen: Soft, non tender, non distended in all four quadrants. No hepatosplenomegaly, ascites, or mass    Assessment:  1. Chronic idiopathic constipation       This is a 79 y.o F here for f/u of constipation, improved with Linzess, and dysphagia, self resolved. At last visit, pt wanted to order dual scopes, but these did not get completed. Discussed with her today that I don't think she needs these scopes anyway. " Will proceed without.     - Continue linzess    RTC as needed    Thank you so much for allowing me to participate in the care of Arletha M White Sarah Abukhader, PA-C [facility]

## 2025-05-22 ENCOUNTER — OFFICE VISIT (OUTPATIENT)
Dept: ORTHOPEDICS | Facility: CLINIC | Age: 80
End: 2025-05-22
Payer: MEDICARE

## 2025-05-22 ENCOUNTER — OFFICE VISIT (OUTPATIENT)
Dept: RHEUMATOLOGY | Facility: CLINIC | Age: 80
End: 2025-05-22
Payer: MEDICARE

## 2025-05-22 VITALS
BODY MASS INDEX: 27.59 KG/M2 | SYSTOLIC BLOOD PRESSURE: 125 MMHG | HEIGHT: 62 IN | HEART RATE: 88 BPM | DIASTOLIC BLOOD PRESSURE: 68 MMHG | WEIGHT: 149.94 LBS

## 2025-05-22 DIAGNOSIS — M79.642 PAIN OF LEFT HAND: ICD-10-CM

## 2025-05-22 DIAGNOSIS — D84.821 IMMUNOSUPPRESSION DUE TO CHRONIC STEROID USE: ICD-10-CM

## 2025-05-22 DIAGNOSIS — Z79.52 IMMUNOSUPPRESSION DUE TO CHRONIC STEROID USE: ICD-10-CM

## 2025-05-22 DIAGNOSIS — M65.332 TRIGGER FINGER OF ALL DIGITS OF LEFT HAND: Primary | ICD-10-CM

## 2025-05-22 DIAGNOSIS — M65.322 TRIGGER FINGER OF ALL DIGITS OF LEFT HAND: Primary | ICD-10-CM

## 2025-05-22 DIAGNOSIS — T38.0X5A IMMUNOSUPPRESSION DUE TO CHRONIC STEROID USE: ICD-10-CM

## 2025-05-22 DIAGNOSIS — D86.9 SARCOIDOSIS: Primary | Chronic | ICD-10-CM

## 2025-05-22 DIAGNOSIS — M65.352 TRIGGER FINGER OF ALL DIGITS OF LEFT HAND: Primary | ICD-10-CM

## 2025-05-22 DIAGNOSIS — D86.86 SARCOID ARTHROPATHY: ICD-10-CM

## 2025-05-22 DIAGNOSIS — G72.9 MYOPATHY: ICD-10-CM

## 2025-05-22 DIAGNOSIS — R53.83 FATIGUE, UNSPECIFIED TYPE: ICD-10-CM

## 2025-05-22 DIAGNOSIS — M65.342 TRIGGER FINGER OF ALL DIGITS OF LEFT HAND: Primary | ICD-10-CM

## 2025-05-22 DIAGNOSIS — M65.312 TRIGGER FINGER OF ALL DIGITS OF LEFT HAND: Primary | ICD-10-CM

## 2025-05-22 PROCEDURE — 99214 OFFICE O/P EST MOD 30 MIN: CPT | Mod: 25,S$GLB,, | Performed by: INTERNAL MEDICINE

## 2025-05-22 PROCEDURE — 3078F DIAST BP <80 MM HG: CPT | Mod: CPTII,S$GLB,, | Performed by: INTERNAL MEDICINE

## 2025-05-22 PROCEDURE — 3074F SYST BP LT 130 MM HG: CPT | Mod: CPTII,S$GLB,, | Performed by: INTERNAL MEDICINE

## 2025-05-22 PROCEDURE — 99999 PR PBB SHADOW E&M-EST. PATIENT-LVL V: CPT | Mod: PBBFAC,,, | Performed by: ORTHOPAEDIC SURGERY

## 2025-05-22 PROCEDURE — 1101F PT FALLS ASSESS-DOCD LE1/YR: CPT | Mod: CPTII,S$GLB,, | Performed by: INTERNAL MEDICINE

## 2025-05-22 PROCEDURE — 96372 THER/PROPH/DIAG INJ SC/IM: CPT | Mod: S$GLB,,, | Performed by: INTERNAL MEDICINE

## 2025-05-22 PROCEDURE — 3288F FALL RISK ASSESSMENT DOCD: CPT | Mod: CPTII,S$GLB,, | Performed by: INTERNAL MEDICINE

## 2025-05-22 PROCEDURE — 1125F AMNT PAIN NOTED PAIN PRSNT: CPT | Mod: CPTII,S$GLB,, | Performed by: INTERNAL MEDICINE

## 2025-05-22 PROCEDURE — 99999 PR PBB SHADOW E&M-EST. PATIENT-LVL IV: CPT | Mod: PBBFAC,,, | Performed by: INTERNAL MEDICINE

## 2025-05-22 PROCEDURE — 1157F ADVNC CARE PLAN IN RCRD: CPT | Mod: CPTII,S$GLB,, | Performed by: INTERNAL MEDICINE

## 2025-05-22 RX ORDER — CEFAZOLIN SODIUM 2 G/50ML
2 SOLUTION INTRAVENOUS
OUTPATIENT
Start: 2025-05-22

## 2025-05-22 RX ORDER — TRIAMCINOLONE ACETONIDE 40 MG/ML
80 INJECTION, SUSPENSION INTRA-ARTICULAR; INTRAMUSCULAR
Status: COMPLETED | OUTPATIENT
Start: 2025-05-22 | End: 2025-05-22

## 2025-05-22 RX ORDER — MUPIROCIN 20 MG/G
OINTMENT TOPICAL
OUTPATIENT
Start: 2025-05-22

## 2025-05-22 RX ORDER — PREDNISONE 1 MG/1
4 TABLET ORAL DAILY
Qty: 120 TABLET | Refills: 2 | Status: SHIPPED | OUTPATIENT
Start: 2025-05-22

## 2025-05-22 RX ADMIN — TRIAMCINOLONE ACETONIDE 80 MG: 40 INJECTION, SUSPENSION INTRA-ARTICULAR; INTRAMUSCULAR at 12:05

## 2025-05-22 NOTE — H&P
[facility]  History & Physical  Orthopedics     SUBJECTIVE:       History of Present Illness    CHIEF COMPLAINT:  - Left hand pain and stiffness, with concern for trigger fingers.     HPI:  Regino presents with left hand pain and stiffness, which have been present for a year and progressively worsening. The pain is severe, particularly at night, interfering with sleep. She reports significant difficulty sleeping due to the pain. Significant finger swelling interferes with blood sugar checks due to diabetes.     She reports a history of trigger fingers in the left hand. Initially, some fingers would get stuck and then pop open, but now all fingers are affected. She has difficulty moving fingers and reports minimal use of the hand. Various remedies have been tried without success, including pain cream, Tylenol, and a hand brace. Recently, a steroid injection was received from the rheumatologist, Dr. Barr, for inflammation.     She has a history of carpal tunnel surgery on the right hand and uses a scooter for mobility. There are no recent changes in diabetes management or use of injectable medications other than insulin.     Regino denies having a pacemaker, defibrillator, or heart stents.     PREVIOUS TREATMENTS:  - Hand brace: Ineffective, sometimes exacerbates the condition  - Pain cream: Ineffective  - Tylenol: Ineffective  - Steroid injection: Received from rheumatologist (Dr. Barr) for inflammation, date not specified     MEDICATIONS:  - Insulin: For diabetes  - Blood thinners: For blood clots  - Tylenol  - Pain cream     SURGICAL HISTORY:  - Carpal tunnel surgery: Right hand     WORK STATUS:  - Regino is disabled  - Uses a scooter for mobility        ROS:  Constitutional: +sleep disturbances, +nightime pain, +difficulty staying asleep  Cardiovascular: +upper extremity edema  Musculoskeletal: +joint pain, +limb pain, +limb swelling, +limited movement, +pain with movement                Past Medical History:   Diagnosis Date    Acid reflux      Allergy      Alopecia      Anemia      Anemia in CKD (chronic kidney disease) 2016    Anxiety      Arthritis      Back pain      Cataract      Chronic kidney disease      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, without long-term current use of insulin      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, without long-term current use of insulin      Dementia, unspecified dementia severity, unspecified dementia type, unspecified whether behavioral, psychotic, or mood disturbance or anxiety 2025    Depression      Diabetes mellitus, type 2      Eye injury as a child      k-abrasion  od    Hyperlipidemia      Hypertension      Hypothyroidism      Immune deficiency disorder      Immune disorder      LOC (loss of consciousness) 2021     at home    Myalgia and myositis 2012    Osteoporosis      Polyneuropathy      Pulmonary embolism 07/10/2021    Renal manifestation of secondary diabetes mellitus      Sarcoidosis      Seizure      Type 2 diabetes mellitus      Ulcer       no cancer    Urinary incontinence              Past Surgical History:   Procedure Laterality Date    CARPAL TUNNEL RELEASE         Rt wrist    CATARACT EXTRACTION W/  INTRAOCULAR LENS IMPLANT Right 2015     Dr. Azevedo    CATARACT EXTRACTION W/  INTRAOCULAR LENS IMPLANT Left 2015     Dr. Azevedo    CERVICAL SPINE SURGERY         SECTION        CHOLECYSTECTOMY        INJECTION OF ANESTHETIC AGENT AROUND NERVE Left 2020     Procedure: BLOCK, NERVE LEFT FEMORAL AND OBTURATOR;  Surgeon: Alfonso Richards MD;  Location: [facility];  Service: Pain Management;  Laterality: Left;  NEEDS CONSENT    INJECTION OF ANESTHETIC AGENT AROUND NERVE Bilateral 10/09/2023     Procedure: BLOCK, NERVE, BILATERAL L3-L4-L5 MEDIAL BRANCH;  Surgeon: Alfonso Richards MD;   Location: [facility];  Service: Pain Management;  Laterality: Bilateral;    INJECTION OF JOINT Left 03/21/2019     Procedure: Injection, Joint  fLUOROSCOPIC jOINT iNJECTION (hIP iNJECTION) LEFT ROCH BURSA AS WELL LEFT TROCHANTERIC BURSA;  Surgeon: Alfonso Richards MD;  Location: [facility];  Service: Pain Management;  Laterality: Left;  NEEDS CONSENT, DIABETIC    INJECTION OF JOINT Left 07/22/2019     Procedure: Injection, Joint FLUOROSCOPIC JOINT INJECTION (HIP INJECTION) LEFT HIP;  Surgeon: Alfonso Richards MD;  Location: [facility];  Service: Pain Management;  Laterality: Left;  NEEDS CONSENT    INJECTION OF JOINT Left 09/12/2019     Procedure: INJECTION, JOINT;  Surgeon: Alfonso Richards MD;  Location: [facility];  Service: Pain Management;  Laterality: Left;  Left Hip and Left GTB Injections    INJECTION OF JOINT Left 07/27/2020     Procedure: INJECTION, JOINT, LEFT HIP and LEFT GREATER TROCHANTERIC BURSA;  Surgeon: Alfonso Richards MD;  Location: [facility];  Service: Pain Management;  Laterality: Left;  INJECTION, JOINT, LEFT HIP and LEFT GREATER TROCHANTERIC BURSA    INJECTION OF JOINT Left 09/03/2020     Procedure: INJECTION, JOINT, LEFT SI;  Surgeon: Alfonso Richards MD;  Location: [facility];  Service: Pain Management;  Laterality: Left;  INJECTION, JOINT, LEFT SI    TRANSFORAMINAL EPIDURAL INJECTION OF STEROID Bilateral 12/05/2019     Procedure: INJECTION, STEROID, EPIDURAL, TRANSFORAMINAL APPROACH;  Surgeon: Alfonso Richards MD;  Location: [facility];  Service: Pain Management;  Laterality: Bilateral;  B/L TF RHIANNA L5  Consent Needed    TRANSFORAMINAL EPIDURAL INJECTION OF STEROID Bilateral 06/29/2020     Procedure: INJECTION, STEROID, EPIDURAL, TRANSFORAMINAL APPROACH L5/S1;  Surgeon: Alfonso Richards MD;  Location: [facility];  Service: Pain Management;  Laterality: Bilateral;  B/L TF RHIANNA L5/S1    TUBAL LIGATION                Review of patient's allergies indicates:   Allergen Reactions    Azathioprine  Shortness Of Breath and Other (See Comments)       Fatigue      Social History          Social History Narrative    Not on file             Family History   Problem Relation Name Age of Onset    Hypertension Mother Martial      Cataracts Mother Martial      No Known Problems Father        Hypertension Maternal Grandmother Nora      Glaucoma Sister Cristina      Arthritis Sister Cristina      No Known Problems Brother Kofi      No Known Problems Maternal Aunt        No Known Problems Maternal Uncle        No Known Problems Paternal Aunt        No Known Problems Paternal Uncle        No Known Problems Maternal Grandfather        No Known Problems Paternal Grandmother        No Known Problems Paternal Grandfather        Kidney failure Sister Rita      Hepatitis Sister Deepali      Cancer Sister Deepali           bone cancer     Immunodeficiency Sister Christiana      Diabetes Son x4      Hypertension Son x4      Lupus Neg Hx        Rheum arthritis Neg Hx        Amblyopia Neg Hx        Blindness Neg Hx        Macular degeneration Neg Hx        Retinal detachment Neg Hx        Strabismus Neg Hx        Stroke Neg Hx        Thyroid disease Neg Hx        Endometrial cancer Neg Hx        Vaginal cancer Neg Hx        Cervical cancer Neg Hx             [Current Medications]    [Current Medications]     Current Outpatient Medications:     (Magic mouthwash) 1:1:1 diphenhydrAMINE(Benadryl) 12.5mg/5ml liq, aluminum & magnesium hydroxide-simethicone (Maalox), LIDOcaine viscous 2%, Swish and spit 5 mLs every 4 (four) hours as needed (mouth pain). for mouth sores, Disp: 150 mL, Rfl: 0    ACCU-CHEK FASTCLIX LANCING DEV Kit, USE AS DIRECTED., Disp: 1 each, Rfl: 0    alpha lipoic acid 600 mg Cap, Take 600 mg by mouth once daily., Disp: 60 each, Rfl: 3    apixaban (ELIQUIS) 5 mg Tab, Take 1 tablet (5 mg total) by mouth 2 (two) times daily. Start this prescription after finishing starter pack, Disp: 60 tablet, Rfl: 5    atorvastatin  (LIPITOR) 20 MG tablet, Take 1 tablet (20 mg total) by mouth once daily., Disp: 90 tablet, Rfl: 3    blood sugar diagnostic (ACCU-CHEK SMARTVIEW TEST STRIP) Strp, Use as directed to check blood sugar twice daily., Disp: 200 strip, Rfl: 3    blood sugar diagnostic Strp, To check BG three times daily, to use with insurance preferred meter, Disp: 300 each, Rfl: 3    blood-glucose meter kit, Use as instructed, Disp: 1 each, Rfl: 0    calcium carb/vit D3/minerals (CALCIUM-VITAMIN D ORAL), Take 1 tablet by mouth once daily., Disp: , Rfl:     carvediloL (COREG) 25 MG tablet, Take 1 tablet (25 mg total) by mouth 2 (two) times daily., Disp: 180 tablet, Rfl: 3    cetirizine (ZYRTEC) 10 MG tablet, Take 1 tablet (10 mg total) by mouth once daily., Disp: 30 tablet, Rfl: 3    clotrimazole (LOTRIMIN) 1 % cream, Apply topically 2 (two) times daily., Disp: 45 g, Rfl: 0    cyanocobalamin, vitamin B-12, (VITAMIN B-12 ORAL), Take 1 tablet by mouth once daily., Disp: , Rfl:     diclofenac sodium (VOLTAREN) 1 % Gel, Apply 2 g topically once daily., Disp: 100 g, Rfl: 3    DULoxetine (CYMBALTA) 60 MG capsule, Take 1 capsule (60 mg total) by mouth once daily., Disp: 90 capsule, Rfl: 3    fenofibrate 160 MG Tab, Take 1 tablet by mouth once daily, Disp: 90 tablet, Rfl: 1    folic acid (FOLVITE) 1 MG tablet, Take 1 tablet (1,000 mcg total) by mouth once daily., Disp: 90 tablet, Rfl: 1    levETIRAcetam (KEPPRA) 750 MG Tab, Take 1 tablet (750 mg total) by mouth 2 (two) times daily., Disp: 60 tablet, Rfl: 11    levothyroxine (SYNTHROID) 50 MCG tablet, Take 1 tablet (50 mcg total) by mouth before breakfast., Disp: 90 tablet, Rfl: 3    linaCLOtide (LINZESS) 72 mcg Cap capsule, Take 1 capsule (72 mcg total) by mouth before breakfast., Disp: 90 capsule, Rfl: 3    magnesium 200 mg Tab, 1 pill by mouth daily, Disp: 4 each, Rfl: 0    memantine (NAMENDA) 5 MG Tab, Take 1 tablet (5 mg total) by mouth 2 (two) times daily., Disp: 60 tablet, Rfl: 11     "NIFEdipine (PROCARDIA-XL) 60 MG (OSM) 24 hr tablet, Take 1 tablet (60 mg total) by mouth 2 (two) times a day., Disp: 180 tablet, Rfl: 1    pantoprazole (PROTONIX) 40 MG tablet, Take 1 tablet (40 mg total) by mouth once daily., Disp: 90 tablet, Rfl: 3    pen needle, diabetic (BD ULTRA-FINE ANA PEN NEEDLE) 32 gauge x 5/32" Ndle, For use with insulin pen, Disp: 100 each, Rfl: 3    potassium chloride SA (K-DUR,KLOR-CON M) 10 MEQ tablet, Take 1 tablet (10 mEq total) by mouth 2 (two) times daily., Disp: 4 tablet, Rfl: 0    predniSONE (DELTASONE) 1 MG tablet, Take 4 tablets (4 mg total) by mouth once daily., Disp: 120 tablet, Rfl: 2    pregabalin (LYRICA) 75 MG capsule, Take 1 capsule (75 mg total) by mouth 2 (two) times daily., Disp: 60 capsule, Rfl: 5    tiZANidine (ZANAFLEX) 2 MG tablet, Take 2 tablets (4 mg total) by mouth every 8 (eight) hours as needed (muscle spasm)., Disp: 270 tablet, Rfl: 1    albuterol-ipratropium (DUO-NEB) 2.5 mg-0.5 mg/3 mL nebulizer solution, Take 3 mLs by nebulization every 4 (four) hours as needed for Wheezing or Shortness of Breath. Rescue, Disp: 90 each, Rfl: 11    fluticasone propion-salmeterol 115-21 mcg/dose (ADVAIR HFA) 115-21 mcg/actuation HFAA inhaler, Inhale 2 puffs into the lungs every 12 (twelve) hours. Controller, Disp: 12 g, Rfl: 3    insulin glargine U-100, Lantus, (LANTUS SOLOSTAR U-100 INSULIN) 100 unit/mL (3 mL) InPn pen, Inject 30 Units into the skin 2 (two) times a day., Disp: 54 mL, Rfl: 1     Current Facility-Administered Medications:     denosumab (PROLIA) injection 60 mg, 60 mg, Subcutaneous, Q6 Months, , 60 mg at 12/23/24 1048     OBJECTIVE:       Vital Signs (Most Recent):  Vitals   There were no vitals filed for this visit.     There is no height or weight on file to calculate BMI.     Physical Exam    Musculoskeletal: Index finger trigger (Left). Thumb trigger (Left). Middle finger trigger (Left). Swelling in left hand. Pain in left hand. Stiffness in left hand. "          Left Hand/Wrist Examination:     Observation/Inspection:  Swelling                       none                  Deformity                     none  Discoloration               none                  Scars                           none                  Atrophy                        none  Patient with severe tenderness palpation at the A1 pulleys of all fingers of the left hand with vivek triggering seen to the left long finger and clicking felt with the other digits with range of motion         HAND/WRIST EXAMINATION:  Finkelstein's Test                                Neg  WHAT Test                                         Neg  Snuff box tenderness                          Neg  Noriega's Test                                     Neg  Hook of Hamate Tenderness              Neg  CMC grind                                           Neg  Circumduction test                              Neg     Neurovascular Exam:  Digits WWP, brisk CR < 3s throughout  NVI motor/LTS to M/R/U nerves, radial pulse 2+  Tinel's Test - Carpal Tunnel                Neg  Tinel's Test - Cubital Tunnel               Neg  Phalen's Test                                      Neg  Median Nerve Compression TestNeg     ROM hand is markedly restricted to the left hand and very painful     ROM wrist full, painless    ROM elbow full, painless     Abdomen not guarded  Respirations nonlabored  Perfusion intact     Diagnostic Results:     Imaging - I independently viewed the patient's imaging as well as the radiology report.  Xrays of the patient's bilateral hands  demonstrate no evidence of any acute fractures or dislocations with some degenerative changes.     EMG - none     ASSESSMENT/PLAN:       79 y.o. yo female with trigger digits entire left hand  Plan: The patient and I had a thorough discussion today.  We discussed the working diagnosis as well as several other potential alternative diagnoses.  Treatment options were discussed, both conservative  and surgical.  Conservative treatment options would include things such as activity modifications, workplace modifications, a period of rest, oral vs topical OTC and prescription anti-inflammatory medications, occupational therapy, splinting/bracing, immobilization, corticosteroid injections, and others.  Surgical options were discussed as well.      Assessment & Plan    PROCEDURES:  - # Procedures  - Regino understands the risks and benefits and elects to proceed with trigger finger releases surgery for all 5 fingers of the left hand due to significant pain affecting sleep.  - Explained risks including stiffness, infection, damage to nerves, tendons, and blood vessels.  - Scheduled surgery for June 20th, pending medical clearance.  - Obtained surgical consent from patient.     At this point in time, the patient has severe trigger digits of the entire left hand.  She would like to proceed with A1 pulley releases of the left thumb, index, long, ring, and small fingers on June 20th 2025 which I feel is reasonable.  She will need perioperative risk stratification and clearance and will be referred to the preop clearance Center.  Follow-up for surgery.       The patient has not responded to adequate non operative treatment at this time and/or non operative treatment is not indicated. Thus, the risks, benefits and alternatives to surgery were discussed with the patient in detail.  Specific risks include but are not limited to bleeding, infection, vessel and/or nerve damage, pain, numbness, tingling, compartment syndrome, need for additional surgery, failure to return to pre-injury and/or preoperative functional status, inability to return to work, scar sensitivity, delayed healing, complex regional pain syndrome, weakness, pulley injury, tendon injury, bowstringing, partial and/or incomplete relief of symptoms, persistence of and/or worsening of symptoms, hardware and/or surgical failure, prominent and/or symptomatic  hardware possibly necessitating future removal, osteomyelitis, amputation, loss of function, stiffness, rotational malalignment, functional debility, dysfunction, decreased  strength, need for prolonged postoperative rehabilitation, malunion, nonunion, deep venous thrombosis, pulmonary embolism, arthritis and death.  The patient states an understanding and wishes to proceed with surgery.   All questions were answered.  No guarantees were implied or stated.  Written informed consent was obtained.     Should the patient's symptoms worsen, persist, or fail to improve they should return for reevaluation and I would be happy to see them back anytime.          Chon Dunham M.D.     Please be aware that this note has been generated with the assistance of [employer] voice-to-text.  Please excuse any spelling or grammatical errors.     Thank you for choosing Dr. Chon Dunham for your orthopedic hand and upper extremity care. It is our goal to provide you with exceptional care that will help keep you healthy, active, and get you back in the game.     If you felt that you received exemplary care today, please consider leaving feedback for Dr. Dunham on [employer] [facility] at [email or web address].     Please do not hesitate to reach out to us via email, phone, or MyChart with any questions, concerns, or feedback.

## 2025-05-22 NOTE — PROGRESS NOTES
[facility]  History & Physical  Orthopedics    SUBJECTIVE:      History of Present Illness    CHIEF COMPLAINT:  - Left hand pain and stiffness, with concern for trigger fingers.    HPI:  Regino presents with left hand pain and stiffness, which have been present for a year and progressively worsening. The pain is severe, particularly at night, interfering with sleep. She reports significant difficulty sleeping due to the pain. Significant finger swelling interferes with blood sugar checks due to diabetes.    She reports a history of trigger fingers in the left hand. Initially, some fingers would get stuck and then pop open, but now all fingers are affected. She has difficulty moving fingers and reports minimal use of the hand. Various remedies have been tried without success, including pain cream, Tylenol, and a hand brace. Recently, a steroid injection was received from the rheumatologist, Dr. Barr, for inflammation.    She has a history of carpal tunnel surgery on the right hand and uses a scooter for mobility. There are no recent changes in diabetes management or use of injectable medications other than insulin.    Regino denies having a pacemaker, defibrillator, or heart stents.    PREVIOUS TREATMENTS:  - Hand brace: Ineffective, sometimes exacerbates the condition  - Pain cream: Ineffective  - Tylenol: Ineffective  - Steroid injection: Received from rheumatologist (Dr. Barr) for inflammation, date not specified    MEDICATIONS:  - Insulin: For diabetes  - Blood thinners: For blood clots  - Tylenol  - Pain cream    SURGICAL HISTORY:  - Carpal tunnel surgery: Right hand    WORK STATUS:  - Regino is disabled  - Uses a scooter for mobility      ROS:  Constitutional: +sleep disturbances, +nightime pain, +difficulty staying asleep  Cardiovascular: +upper extremity edema  Musculoskeletal: +joint pain, +limb pain, +limb swelling, +limited movement, +pain with movement         Past Medical History:    Diagnosis Date    Acid reflux     Allergy     Alopecia     Anemia     Anemia in CKD (chronic kidney disease) 2016    Anxiety     Arthritis     Back pain     Cataract     Chronic kidney disease     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, without long-term current use of insulin     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, without long-term current use of insulin     Dementia, unspecified dementia severity, unspecified dementia type, unspecified whether behavioral, psychotic, or mood disturbance or anxiety 2025    Depression     Diabetes mellitus, type 2     Eye injury as a child     k-abrasion  od    Hyperlipidemia     Hypertension     Hypothyroidism     Immune deficiency disorder     Immune disorder     LOC (loss of consciousness) 2021    at home    Myalgia and myositis 2012    Osteoporosis     Polyneuropathy     Pulmonary embolism 07/10/2021    Renal manifestation of secondary diabetes mellitus     Sarcoidosis     Seizure     Type 2 diabetes mellitus     Ulcer     no cancer    Urinary incontinence      Past Surgical History:   Procedure Laterality Date    CARPAL TUNNEL RELEASE      Rt wrist    CATARACT EXTRACTION W/  INTRAOCULAR LENS IMPLANT Right 2015    Dr. Azevedo    CATARACT EXTRACTION W/  INTRAOCULAR LENS IMPLANT Left 2015    Dr. Azevedo    CERVICAL SPINE SURGERY       SECTION      CHOLECYSTECTOMY      INJECTION OF ANESTHETIC AGENT AROUND NERVE Left 2020    Procedure: BLOCK, NERVE LEFT FEMORAL AND OBTURATOR;  Surgeon: Alfonso Richards MD;  Location: [facility];  Service: Pain Management;  Laterality: Left;  NEEDS CONSENT    INJECTION OF ANESTHETIC AGENT AROUND NERVE Bilateral 10/09/2023    Procedure: BLOCK, NERVE, BILATERAL L3-L4-L5 MEDIAL BRANCH;  Surgeon: Alfonso Richards MD;  Location: [facility];  Service: Pain Management;  Laterality: Bilateral;     INJECTION OF JOINT Left 03/21/2019    Procedure: Injection, Joint  fLUOROSCOPIC jOINT iNJECTION (hIP iNJECTION) LEFT ROCH BURSA AS WELL LEFT TROCHANTERIC BURSA;  Surgeon: Alfonso Richards MD;  Location: [facility];  Service: Pain Management;  Laterality: Left;  NEEDS CONSENT, DIABETIC    INJECTION OF JOINT Left 07/22/2019    Procedure: Injection, Joint FLUOROSCOPIC JOINT INJECTION (HIP INJECTION) LEFT HIP;  Surgeon: Alfonso Richards MD;  Location: [facility];  Service: Pain Management;  Laterality: Left;  NEEDS CONSENT    INJECTION OF JOINT Left 09/12/2019    Procedure: INJECTION, JOINT;  Surgeon: Alfonso Richards MD;  Location: [facility];  Service: Pain Management;  Laterality: Left;  Left Hip and Left GTB Injections    INJECTION OF JOINT Left 07/27/2020    Procedure: INJECTION, JOINT, LEFT HIP and LEFT GREATER TROCHANTERIC BURSA;  Surgeon: Alfonso Richards MD;  Location: [facility];  Service: Pain Management;  Laterality: Left;  INJECTION, JOINT, LEFT HIP and LEFT GREATER TROCHANTERIC BURSA    INJECTION OF JOINT Left 09/03/2020    Procedure: INJECTION, JOINT, LEFT SI;  Surgeon: Alfonso Richards MD;  Location: [facility];  Service: Pain Management;  Laterality: Left;  INJECTION, JOINT, LEFT SI    TRANSFORAMINAL EPIDURAL INJECTION OF STEROID Bilateral 12/05/2019    Procedure: INJECTION, STEROID, EPIDURAL, TRANSFORAMINAL APPROACH;  Surgeon: Alfonso Richards MD;  Location: [facility];  Service: Pain Management;  Laterality: Bilateral;  B/L TF RHIANNA L5  Consent Needed    TRANSFORAMINAL EPIDURAL INJECTION OF STEROID Bilateral 06/29/2020    Procedure: INJECTION, STEROID, EPIDURAL, TRANSFORAMINAL APPROACH L5/S1;  Surgeon: Alfonso Richards MD;  Location: [facility];  Service: Pain Management;  Laterality: Bilateral;  B/L TF RHIANNA L5/S1    TUBAL LIGATION       Review of patient's allergies indicates:   Allergen Reactions    Azathioprine Shortness Of Breath and Other (See Comments)     Fatigue     Social History     Social History  Narrative    Not on file     Family History   Problem Relation Name Age of Onset    Hypertension Mother Martial     Cataracts Mother Martial     No Known Problems Father      Hypertension Maternal Grandmother Nora     Glaucoma Sister Cristina     Arthritis Sister Cristina     No Known Problems Brother Kfoi     No Known Problems Maternal Aunt      No Known Problems Maternal Uncle      No Known Problems Paternal Aunt      No Known Problems Paternal Uncle      No Known Problems Maternal Grandfather      No Known Problems Paternal Grandmother      No Known Problems Paternal Grandfather      Kidney failure Sister Rita     Hepatitis Sister Deepali     Cancer Sister Deepali         bone cancer     Immunodeficiency Sister Christiana     Diabetes Son x4     Hypertension Son x4     Lupus Neg Hx      Rheum arthritis Neg Hx      Amblyopia Neg Hx      Blindness Neg Hx      Macular degeneration Neg Hx      Retinal detachment Neg Hx      Strabismus Neg Hx      Stroke Neg Hx      Thyroid disease Neg Hx      Endometrial cancer Neg Hx      Vaginal cancer Neg Hx      Cervical cancer Neg Hx         Current Medications[1]    OBJECTIVE:      Vital Signs (Most Recent):  There were no vitals filed for this visit.  There is no height or weight on file to calculate BMI.    Physical Exam    Musculoskeletal: Index finger trigger (Left). Thumb trigger (Left). Middle finger trigger (Left). Swelling in left hand. Pain in left hand. Stiffness in left hand.         Left Hand/Wrist Examination:    Observation/Inspection:  Swelling  none    Deformity  none  Discoloration  none     Scars   none    Atrophy  none  Patient with severe tenderness palpation at the A1 pulleys of all fingers of the left hand with vivek triggering seen to the left long finger and clicking felt with the other digits with range of motion       HAND/WRIST EXAMINATION:  Finkelstein's Test   Neg  WHAT Test    Neg  Snuff box tenderness   Neg  Noriega's Test    Neg  Hook of [place]ate  Tenderness  Neg  CMC grind    Neg  Circumduction test   Neg    Neurovascular Exam:  Digits WWP, brisk CR < 3s throughout  NVI motor/LTS to M/R/U nerves, radial pulse 2+  Tinel's Test - Carpal Tunnel  Neg  Tinel's Test - Cubital Tunnel  Neg  Phalen's Test    Neg  Median Nerve Compression Test Neg    ROM hand is markedly restricted to the left hand and very painful    ROM wrist full, painless    ROM elbow full, painless    Abdomen not guarded  Respirations nonlabored  Perfusion intact    Diagnostic Results:     Imaging - I independently viewed the patient's imaging as well as the radiology report.  Xrays of the patient's bilateral hands  demonstrate no evidence of any acute fractures or dislocations with some degenerative changes.    EMG - none    ASSESSMENT/PLAN:      79 y.o. yo female with trigger digits entire left hand  Plan: The patient and I had a thorough discussion today.  We discussed the working diagnosis as well as several other potential alternative diagnoses.  Treatment options were discussed, both conservative and surgical.  Conservative treatment options would include things such as activity modifications, workplace modifications, a period of rest, oral vs topical OTC and prescription anti-inflammatory medications, occupational therapy, splinting/bracing, immobilization, corticosteroid injections, and others.  Surgical options were discussed as well.     Assessment & Plan    PROCEDURES:  - # Procedures  - Regino understands the risks and benefits and elects to proceed with trigger finger releases surgery for all 5 fingers of the left hand due to significant pain affecting sleep.  - Explained risks including stiffness, infection, damage to nerves, tendons, and blood vessels.  - Scheduled surgery for June 20th, pending medical clearance.  - Obtained surgical consent from patient.    At this point in time, the patient has severe trigger digits of the entire left hand.  She would like to proceed with A1  pulley releases of the left thumb, index, long, ring, and small fingers on June 20th 2025 which I feel is reasonable.  She will need perioperative risk stratification and clearance and will be referred to the preop clearance Center.  Follow-up for surgery.      The patient has not responded to adequate non operative treatment at this time and/or non operative treatment is not indicated. Thus, the risks, benefits and alternatives to surgery were discussed with the patient in detail.  Specific risks include but are not limited to bleeding, infection, vessel and/or nerve damage, pain, numbness, tingling, compartment syndrome, need for additional surgery, failure to return to pre-injury and/or preoperative functional status, inability to return to work, scar sensitivity, delayed healing, complex regional pain syndrome, weakness, pulley injury, tendon injury, bowstringing, partial and/or incomplete relief of symptoms, persistence of and/or worsening of symptoms, hardware and/or surgical failure, prominent and/or symptomatic hardware possibly necessitating future removal, osteomyelitis, amputation, loss of function, stiffness, rotational malalignment, functional debility, dysfunction, decreased  strength, need for prolonged postoperative rehabilitation, malunion, nonunion, deep venous thrombosis, pulmonary embolism, arthritis and death.  The patient states an understanding and wishes to proceed with surgery.   All questions were answered.  No guarantees were implied or stated.  Written informed consent was obtained.    Should the patient's symptoms worsen, persist, or fail to improve they should return for reevaluation and I would be happy to see them back anytime.        Chon Dunham M.D.    Please be aware that this note has been generated with the assistance of [employer] voice-to-text.  Please excuse any spelling or grammatical errors.    Thank you for choosing Dr. Chon Dunham for your orthopedic hand and upper  extremity care. It is our goal to provide you with exceptional care that will help keep you healthy, active, and get you back in the game.     If you felt that you received exemplary care today, please consider leaving feedback for Dr. Dunham on [employer] at [email or web address].    Please do not hesitate to reach out to us via email, phone, or MyChart with any questions, concerns, or feedback.           [1]   Current Outpatient Medications:     (Magic mouthwash) 1:1:1 diphenhydrAMINE(Benadryl) 12.5mg/5ml liq, aluminum & magnesium hydroxide-simethicone (Maalox), LIDOcaine viscous 2%, Swish and spit 5 mLs every 4 (four) hours as needed (mouth pain). for mouth sores, Disp: 150 mL, Rfl: 0    ACCU-CHEK FASTCLIX LANCING DEV Kit, USE AS DIRECTED., Disp: 1 each, Rfl: 0    alpha lipoic acid 600 mg Cap, Take 600 mg by mouth once daily., Disp: 60 each, Rfl: 3    apixaban (ELIQUIS) 5 mg Tab, Take 1 tablet (5 mg total) by mouth 2 (two) times daily. Start this prescription after finishing starter pack, Disp: 60 tablet, Rfl: 5    atorvastatin (LIPITOR) 20 MG tablet, Take 1 tablet (20 mg total) by mouth once daily., Disp: 90 tablet, Rfl: 3    blood sugar diagnostic (ACCU-CHEK SMARTVIEW TEST STRIP) Strp, Use as directed to check blood sugar twice daily., Disp: 200 strip, Rfl: 3    blood sugar diagnostic Strp, To check BG three times daily, to use with insurance preferred meter, Disp: 300 each, Rfl: 3    blood-glucose meter kit, Use as instructed, Disp: 1 each, Rfl: 0    calcium carb/vit D3/minerals (CALCIUM-VITAMIN D ORAL), Take 1 tablet by mouth once daily., Disp: , Rfl:     carvediloL (COREG) 25 MG tablet, Take 1 tablet (25 mg total) by mouth 2 (two) times daily., Disp: 180 tablet, Rfl: 3    cetirizine (ZYRTEC) 10 MG tablet, Take 1 tablet (10 mg total) by mouth once daily., Disp: 30 tablet, Rfl: 3    clotrimazole (LOTRIMIN) 1 % cream, Apply topically 2 (two) times daily., Disp: 45 g, Rfl:  "0    cyanocobalamin, vitamin B-12, (VITAMIN B-12 ORAL), Take 1 tablet by mouth once daily., Disp: , Rfl:     diclofenac sodium (VOLTAREN) 1 % Gel, Apply 2 g topically once daily., Disp: 100 g, Rfl: 3    DULoxetine (CYMBALTA) 60 MG capsule, Take 1 capsule (60 mg total) by mouth once daily., Disp: 90 capsule, Rfl: 3    fenofibrate 160 MG Tab, Take 1 tablet by mouth once daily, Disp: 90 tablet, Rfl: 1    folic acid (FOLVITE) 1 MG tablet, Take 1 tablet (1,000 mcg total) by mouth once daily., Disp: 90 tablet, Rfl: 1    levETIRAcetam (KEPPRA) 750 MG Tab, Take 1 tablet (750 mg total) by mouth 2 (two) times daily., Disp: 60 tablet, Rfl: 11    levothyroxine (SYNTHROID) 50 MCG tablet, Take 1 tablet (50 mcg total) by mouth before breakfast., Disp: 90 tablet, Rfl: 3    linaCLOtide (LINZESS) 72 mcg Cap capsule, Take 1 capsule (72 mcg total) by mouth before breakfast., Disp: 90 capsule, Rfl: 3    magnesium 200 mg Tab, 1 pill by mouth daily, Disp: 4 each, Rfl: 0    memantine (NAMENDA) 5 MG Tab, Take 1 tablet (5 mg total) by mouth 2 (two) times daily., Disp: 60 tablet, Rfl: 11    NIFEdipine (PROCARDIA-XL) 60 MG (OSM) 24 hr tablet, Take 1 tablet (60 mg total) by mouth 2 (two) times a day., Disp: 180 tablet, Rfl: 1    pantoprazole (PROTONIX) 40 MG tablet, Take 1 tablet (40 mg total) by mouth once daily., Disp: 90 tablet, Rfl: 3    pen needle, diabetic (BD ULTRA-FINE ANA PEN NEEDLE) 32 gauge x 5/32" Ndle, For use with insulin pen, Disp: 100 each, Rfl: 3    potassium chloride SA (K-DUR,KLOR-CON M) 10 MEQ tablet, Take 1 tablet (10 mEq total) by mouth 2 (two) times daily., Disp: 4 tablet, Rfl: 0    predniSONE (DELTASONE) 1 MG tablet, Take 4 tablets (4 mg total) by mouth once daily., Disp: 120 tablet, Rfl: 2    pregabalin (LYRICA) 75 MG capsule, Take 1 capsule (75 mg total) by mouth 2 (two) times daily., Disp: 60 capsule, Rfl: 5    tiZANidine (ZANAFLEX) 2 MG tablet, Take 2 tablets (4 mg total) by mouth every 8 (eight) hours as needed " (muscle spasm)., Disp: 270 tablet, Rfl: 1    albuterol-ipratropium (DUO-NEB) 2.5 mg-0.5 mg/3 mL nebulizer solution, Take 3 mLs by nebulization every 4 (four) hours as needed for Wheezing or Shortness of Breath. Rescue, Disp: 90 each, Rfl: 11    fluticasone propion-salmeterol 115-21 mcg/dose (ADVAIR HFA) 115-21 mcg/actuation HFAA inhaler, Inhale 2 puffs into the lungs every 12 (twelve) hours. Controller, Disp: 12 g, Rfl: 3    insulin glargine U-100, Lantus, (LANTUS SOLOSTAR U-100 INSULIN) 100 unit/mL (3 mL) InPn pen, Inject 30 Units into the skin 2 (two) times a day., Disp: 54 mL, Rfl: 1    Current Facility-Administered Medications:     denosumab (PROLIA) injection 60 mg, 60 mg, Subcutaneous, Q6 Months, , 60 mg at 12/23/24 9902

## 2025-05-23 ENCOUNTER — OFFICE VISIT (OUTPATIENT)
Dept: UROLOGY | Facility: CLINIC | Age: 80
End: 2025-05-23
Payer: MEDICARE

## 2025-05-23 VITALS — WEIGHT: 149 LBS | BODY MASS INDEX: 27.25 KG/M2

## 2025-05-23 DIAGNOSIS — N28.1 RENAL CYST: ICD-10-CM

## 2025-05-23 DIAGNOSIS — N22 CALCULUS OF URINARY TRACT IN DISEASES CLASSIFIED ELSEWHERE: Primary | ICD-10-CM

## 2025-05-23 PROCEDURE — 99999 PR PBB SHADOW E&M-EST. PATIENT-LVL IV: CPT | Mod: PBBFAC,,, | Performed by: STUDENT IN AN ORGANIZED HEALTH CARE EDUCATION/TRAINING PROGRAM

## 2025-05-23 NOTE — PROGRESS NOTES
Patient ID: Regino Lawrence is a 79 y.o. female.    Chief Complaint: renal stone, cyst  Referral: Darnell Burris, FNP  [address]  [place] [address]     HPI  79 y.o. who presents to the Urology clinic for evaluation of urolithiasis noted on imaging from 10/2024. Patient notes R flank has been ongoing for past 1 year at least. Not associated w/ nausea, vomiting, hematuria, dysuria. Patient notes the pain has radiated to her left side and causes her to be doubled over. No FH of stones. Extensive Meclosert co morbidities,   Medically Necessary ROS documented in HPI    Past Medical History  Active Ambulatory Problems     Diagnosis Date Noted    Fatigue 09/06/2012    Controlled type 2 diabetes mellitus with diabetic polyneuropathy, with long-term current use of insulin 09/06/2012    Hypothyroidism 01/07/2013    Combined 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 07/25/2013    Hypertension associated with stage 3a chronic kidney disease due to type 2 diabetes mellitus 07/25/2013    Polyneuropathy 10/24/2013    Hypokalemia 10/24/2013    Bilateral thoracic back pain 05/29/2014    Bilateral carpal tunnel syndrome 06/25/2014    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, without long-term current use of insulin 10/27/2014    Sarcoidosis 12/17/2014    Corneal scar, right eye 04/02/2015    Nuclear sclerosis 04/02/2015    Senile nuclear sclerosis 04/30/2015    Left shoulder pain 10/06/2015    Cervical spinal stenosis 10/16/2015    Anemia in stage 3 chronic kidney disease 11/11/2015    GERD (gastroesophageal reflux disease) 11/11/2015    Debility 11/20/2015    S/P cervical spinal fusion 12/15/2015    Chronic bilateral low back pain with left-sided sciatica 04/29/2016    Degenerative disc disease, lumbar 06/14/2016    Poor motor control of trunk 06/14/2016    Impaired mobility 06/14/2016    Muscle weakness 06/14/2016    Iron  deficiency anemia 08/28/2016    Anemia in CKD (chronic kidney disease) 09/22/2016    Procedure and treatment not carried out because of patient's decision for reasons of belief and group pressure 09/22/2016    Current use of steroid medication 12/30/2016    DM type 2 without retinopathy 03/23/2017    Pseudophakia 03/23/2017    Insufficiency of tear film of both eyes 03/23/2017    Refractive error 03/23/2017    Myopathy 05/05/2017    Osteopenia 05/05/2017    Aortic atherosclerosis 05/12/2017    Dry eye syndrome, bilateral 08/06/2018    Neck pain 08/08/2018    Lumbar disc herniation with radiculopathy 09/26/2018    Antalgic gait 09/26/2018    Lumbar radiculopathy 10/08/2018    Lumbar stenosis with neurogenic claudication 11/13/2018    Anemia in stage 3a chronic kidney disease     Greater trochanteric bursitis of left hip 01/06/2019    Left hip pain 03/21/2019    Chest pain, atypical 04/03/2019    Hemispheric retinal vein occlusion with macular edema of left eye 06/03/2019    Degenerative joint disease (DJD) of hip 07/22/2019    Chronic pain 09/12/2019    Left sided numbness 10/18/2019    Sacroiliitis 02/07/2020    Osteoarthritis of hip 07/27/2020    Macrocytosis without anemia 02/24/2021    Acute renal failure superimposed on stage 3a chronic kidney disease 02/24/2021    Hypocalcemia 02/24/2021    Seizure 03/12/2021    T4 vertebral fracture 07/08/2021    Cervical subluxation 07/08/2021    Stenosis of cervical spine 07/09/2021    History of noncompliance with medical treatment 09/24/2021    Generalized anxiety disorder 10/19/2020    FAUSTIN (dyspnea on exertion) 06/13/2022    Stage 3a chronic kidney disease 04/03/2023    Long-term insulin use 04/03/2023    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    Pulmonary hypertension, unspecified 07/10/2024    Calcified granuloma of lung - CT 2022 07/30/2024    Secondary hyperparathyroidism of renal origin 07/30/2024    Dizziness 03/24/2025    Long term (current) use of  anticoagulants 06/23/2022    Nonintractable epilepsy without status epilepticus 03/24/2025    Hyponatremia 03/24/2025    Episode of transient neurologic symptoms 03/24/2025    History of pulmonary embolus (PE) 03/24/2025    Sarcoid arthropathy 04/29/2025    Dementia, unspecified dementia severity, unspecified dementia type, unspecified whether behavioral, psychotic, or mood disturbance or anxiety 04/29/2025    Chronic obstructive pulmonary disease, unspecified COPD type 04/29/2025     Resolved Ambulatory Problems     Diagnosis Date Noted    Myalgia and myositis 09/06/2012    Osteoporosis 11/28/2012    Shoulder pain 11/28/2012    Knee pain, right 09/09/2013    Gastritis 10/24/2013    Hip pain, right 12/09/2013    Dysphagia 02/24/2014    Diabetes mellitus with stage 3 chronic kidney disease 10/24/2014    Right foot pain 12/02/2014    Post-operative state 05/01/2015    Immunosuppression 07/06/2015    Vitamin B12 deficiency anemia 07/06/2015    Cervicalgia 09/22/2015    Edema 11/11/2015    Cervical stenosis of spine 11/16/2015    Uterine prolapse 08/01/2016    Vaginal atrophy 08/01/2016    Preop cardiovascular exam 11/25/2016    Uterovaginal prolapse, complete 11/25/2016    Stress incontinence in female 11/25/2016    Acute left-sided low back pain without sciatica 09/26/2018    Chronic bilateral low back pain without sciatica 11/13/2018    Muscle weakness of lower extremity 03/11/2019    Poor posture 03/11/2019    Impaired functional mobility, balance, gait, and endurance 03/11/2019    Dyspnea on exertion 10/18/2019    Chronic, continuous use of opioids 02/07/2020    Encephalopathy, metabolic 02/23/2021    Lactic acidosis 02/24/2021    Nausea and vomiting 02/24/2021    Elevated troponin 02/24/2021    Hypomagnesemia 02/24/2021    Seizure-like activity 07/08/2021    Multiple subsegmental pulmonary emboli without acute cor pulmonale 07/10/2021     Past Medical History:   Diagnosis Date    Acid reflux     Allergy      Alopecia     Anemia     Anxiety     Arthritis     Back pain     Cataract     Chronic kidney disease     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, without long-term current use of insulin     Depression     Diabetes mellitus, type 2     Eye injury as a child     Hyperlipidemia     Hypertension     Immune deficiency disorder     Immune disorder     LOC (loss of consciousness) 2021    Osteoporosis     Pulmonary embolism 07/10/2021    Renal manifestation of secondary diabetes mellitus     Type 2 diabetes mellitus     Ulcer     Urinary incontinence          Past Surgical History  Past Surgical History:   Procedure Laterality Date    CARPAL TUNNEL RELEASE      Rt wrist    CATARACT EXTRACTION W/  INTRAOCULAR LENS IMPLANT Right 2015    Dr. Azevedo    CATARACT EXTRACTION W/  INTRAOCULAR LENS IMPLANT Left 2015    Dr. Azevedo    CERVICAL SPINE SURGERY       SECTION      CHOLECYSTECTOMY      INJECTION OF ANESTHETIC AGENT AROUND NERVE Left 2020    Procedure: BLOCK, NERVE LEFT FEMORAL AND OBTURATOR;  Surgeon: Alfonso Richards MD;  Location: [facility];  Service: Pain Management;  Laterality: Left;  NEEDS CONSENT    INJECTION OF ANESTHETIC AGENT AROUND NERVE Bilateral 10/09/2023    Procedure: BLOCK, NERVE, BILATERAL L3-L4-L5 MEDIAL BRANCH;  Surgeon: Alfonso Richards MD;  Location: [facility];  Service: Pain Management;  Laterality: Bilateral;    INJECTION OF JOINT Left 2019    Procedure: Injection, Joint  fLUOROSCOPIC jOINT iNJECTION (hIP iNJECTION) LEFT ROCH BURSA AS WELL LEFT TROCHANTERIC BURSA;  Surgeon: Alfonso Richards MD;  Location: [facility];  Service: Pain Management;  Laterality: Left;  NEEDS CONSENT, DIABETIC    INJECTION OF JOINT Left 2019    Procedure: Injection, Joint FLUOROSCOPIC JOINT INJECTION (HIP INJECTION) LEFT HIP;  Surgeon: Alfonso Richards MD;  Location: [facility];  Service: Pain Management;  Laterality: Left;  NEEDS CONSENT    INJECTION  OF JOINT Left 09/12/2019    Procedure: INJECTION, JOINT;  Surgeon: Alfonso Richards MD;  Location: [facility];  Service: Pain Management;  Laterality: Left;  Left Hip and Left GTB Injections    INJECTION OF JOINT Left 07/27/2020    Procedure: INJECTION, JOINT, LEFT HIP and LEFT GREATER TROCHANTERIC BURSA;  Surgeon: Alfonso Richards MD;  Location: [facility];  Service: Pain Management;  Laterality: Left;  INJECTION, JOINT, LEFT HIP and LEFT GREATER TROCHANTERIC BURSA    INJECTION OF JOINT Left 09/03/2020    Procedure: INJECTION, JOINT, LEFT SI;  Surgeon: Alfonso Richards MD;  Location: [facility];  Service: Pain Management;  Laterality: Left;  INJECTION, JOINT, LEFT SI    TRANSFORAMINAL EPIDURAL INJECTION OF STEROID Bilateral 12/05/2019    Procedure: INJECTION, STEROID, EPIDURAL, TRANSFORAMINAL APPROACH;  Surgeon: Alfonso Richards MD;  Location: [facility];  Service: Pain Management;  Laterality: Bilateral;  B/L TF RHIANNA L5  Consent Needed    TRANSFORAMINAL EPIDURAL INJECTION OF STEROID Bilateral 06/29/2020    Procedure: INJECTION, STEROID, EPIDURAL, TRANSFORAMINAL APPROACH L5/S1;  Surgeon: Alfonso Richards MD;  Location: [facility];  Service: Pain Management;  Laterality: Bilateral;  B/L TF RHIANNA L5/S1    TUBAL LIGATION         Social History       Medications  Current Medications[1]    Allergies  Review of patient's allergies indicates:   Allergen Reactions    Azathioprine Shortness Of Breath and Other (See Comments)     Fatigue       Patient's PMH, FH, Social hx, Medications, allergies reviewed and updated as pertinent to today's visit    Objective:      Physical Exam  Constitutional:       Appearance: She is well-developed.   HENT:      Head: Normocephalic and atraumatic.   Eyes:      Conjunctiva/sclera: Conjunctivae normal.   Pulmonary:      Effort: Pulmonary effort is normal. No respiratory distress.   Abdominal:      General: Abdomen is flat. There is no distension.      Palpations: Abdomen is soft. There is no mass.       Tenderness: There is no abdominal tenderness. There is no right CVA tenderness, left CVA tenderness or guarding.   Musculoskeletal:      Comments: Wheel chair   Skin:     General: Skin is warm.      Findings: No rash.   Neurological:      Mental Status: She is alert and oriented to person, place, and time.   Psychiatric:         Behavior: Behavior normal.             Assessment:       1. Calculus of urinary tract in diseases classified elsewhere    2. Renal cyst        Plan:     Renal cyst, appears benign    Calculus noted on RBUS from 10/2024  Recommend CT renal stone to eval and sure stone is present, if obstructing stone revealed I discussed endoscopic treatment for management given pain ongoing for 1 year  Discussed if stone not found to be the source of her pain, especially migrating to the contralateral side, would advise FU w/ PCP for possible physical therapy/ additional work up of possible MSK pathology  Patient VU   Visit today included increased complexity associated with the care of the episodic problem as above addressed and managing the longitudinal care of the patient due to the serious and/or complex managed problem(s) as above.         [1]   Current Outpatient Medications:     (Magic mouthwash) 1:1:1 diphenhydrAMINE(Benadryl) 12.5mg/5ml liq, aluminum & magnesium hydroxide-simethicone (Maalox), LIDOcaine viscous 2%, Swish and spit 5 mLs every 4 (four) hours as needed (mouth pain). for mouth sores, Disp: 150 mL, Rfl: 0    ACCU-CHEK FASTCLIX LANCING DEV Kit, USE AS DIRECTED., Disp: 1 each, Rfl: 0    alpha lipoic acid 600 mg Cap, Take 600 mg by mouth once daily., Disp: 60 each, Rfl: 3    apixaban (ELIQUIS) 5 mg Tab, Take 1 tablet (5 mg total) by mouth 2 (two) times daily. Start this prescription after finishing starter pack, Disp: 60 tablet, Rfl: 5    atorvastatin (LIPITOR) 20 MG tablet, Take 1 tablet (20 mg total) by mouth once daily., Disp: 90 tablet, Rfl: 3    blood sugar diagnostic (ACCU-CHEK  SMARTVIEW TEST STRIP) Strp, Use as directed to check blood sugar twice daily., Disp: 200 strip, Rfl: 3    blood sugar diagnostic Strp, To check BG three times daily, to use with insurance preferred meter, Disp: 300 each, Rfl: 3    blood-glucose meter kit, Use as instructed, Disp: 1 each, Rfl: 0    calcium carb/vit D3/minerals (CALCIUM-VITAMIN D ORAL), Take 1 tablet by mouth once daily., Disp: , Rfl:     carvediloL (COREG) 25 MG tablet, Take 1 tablet (25 mg total) by mouth 2 (two) times daily., Disp: 180 tablet, Rfl: 3    cetirizine (ZYRTEC) 10 MG tablet, Take 1 tablet (10 mg total) by mouth once daily., Disp: 30 tablet, Rfl: 3    clotrimazole (LOTRIMIN) 1 % cream, Apply topically 2 (two) times daily., Disp: 45 g, Rfl: 0    cyanocobalamin, vitamin B-12, (VITAMIN B-12 ORAL), Take 1 tablet by mouth once daily., Disp: , Rfl:     diclofenac sodium (VOLTAREN) 1 % Gel, Apply 2 g topically once daily., Disp: 100 g, Rfl: 3    DULoxetine (CYMBALTA) 60 MG capsule, Take 1 capsule (60 mg total) by mouth once daily., Disp: 90 capsule, Rfl: 3    fenofibrate 160 MG Tab, Take 1 tablet by mouth once daily, Disp: 90 tablet, Rfl: 1    folic acid (FOLVITE) 1 MG tablet, Take 1 tablet (1,000 mcg total) by mouth once daily., Disp: 90 tablet, Rfl: 1    levETIRAcetam (KEPPRA) 750 MG Tab, Take 1 tablet (750 mg total) by mouth 2 (two) times daily., Disp: 60 tablet, Rfl: 11    levothyroxine (SYNTHROID) 50 MCG tablet, Take 1 tablet (50 mcg total) by mouth before breakfast., Disp: 90 tablet, Rfl: 3    linaCLOtide (LINZESS) 72 mcg Cap capsule, Take 1 capsule (72 mcg total) by mouth before breakfast., Disp: 90 capsule, Rfl: 3    magnesium 200 mg Tab, 1 pill by mouth daily, Disp: 4 each, Rfl: 0    memantine (NAMENDA) 5 MG Tab, Take 1 tablet (5 mg total) by mouth 2 (two) times daily., Disp: 60 tablet, Rfl: 11    NIFEdipine (PROCARDIA-XL) 60 MG (OSM) 24 hr tablet, Take 1 tablet (60 mg total) by mouth 2 (two) times a day., Disp: 180 tablet, Rfl: 1     "pantoprazole (PROTONIX) 40 MG tablet, Take 1 tablet (40 mg total) by mouth once daily., Disp: 90 tablet, Rfl: 3    pen needle, diabetic (BD ULTRA-FINE ANA PEN NEEDLE) 32 gauge x 5/32" Ndle, For use with insulin pen, Disp: 100 each, Rfl: 3    potassium chloride SA (K-DUR,KLOR-CON M) 10 MEQ tablet, Take 1 tablet (10 mEq total) by mouth 2 (two) times daily., Disp: 4 tablet, Rfl: 0    predniSONE (DELTASONE) 1 MG tablet, Take 4 tablets (4 mg total) by mouth once daily., Disp: 120 tablet, Rfl: 2    pregabalin (LYRICA) 75 MG capsule, Take 1 capsule (75 mg total) by mouth 2 (two) times daily., Disp: 60 capsule, Rfl: 5    tiZANidine (ZANAFLEX) 2 MG tablet, Take 2 tablets (4 mg total) by mouth every 8 (eight) hours as needed (muscle spasm)., Disp: 270 tablet, Rfl: 1    albuterol-ipratropium (DUO-NEB) 2.5 mg-0.5 mg/3 mL nebulizer solution, Take 3 mLs by nebulization every 4 (four) hours as needed for Wheezing or Shortness of Breath. Rescue, Disp: 90 each, Rfl: 11    fluticasone propion-salmeterol 115-21 mcg/dose (ADVAIR HFA) 115-21 mcg/actuation HFAA inhaler, Inhale 2 puffs into the lungs every 12 (twelve) hours. Controller, Disp: 12 g, Rfl: 3    insulin glargine U-100, Lantus, (LANTUS SOLOSTAR U-100 INSULIN) 100 unit/mL (3 mL) InPn pen, Inject 30 Units into the skin 2 (two) times a day., Disp: 54 mL, Rfl: 1    Current Facility-Administered Medications:     denosumab (PROLIA) injection 60 mg, 60 mg, Subcutaneous, Q6 Months, , 60 mg at 12/23/24 1048    "

## 2025-05-27 ENCOUNTER — HOSPITAL ENCOUNTER (OUTPATIENT)
Dept: RADIOLOGY | Facility: HOSPITAL | Age: 80
Discharge: HOME | End: 2025-05-27
Attending: STUDENT IN AN ORGANIZED HEALTH CARE EDUCATION/TRAINING PROGRAM
Payer: MEDICARE

## 2025-05-27 DIAGNOSIS — N22 CALCULUS OF URINARY TRACT IN DISEASES CLASSIFIED ELSEWHERE: ICD-10-CM

## 2025-05-27 PROCEDURE — 74176 CT ABD & PELVIS W/O CONTRAST: CPT | Mod: 26,,, | Performed by: RADIOLOGY

## 2025-05-27 PROCEDURE — 74176 CT ABD & PELVIS W/O CONTRAST: CPT | Mod: TC

## 2025-05-29 DIAGNOSIS — D63.1 ANEMIA IN STAGE 3 CHRONIC KIDNEY DISEASE: Primary | ICD-10-CM

## 2025-05-29 DIAGNOSIS — N18.30 ANEMIA IN STAGE 3 CHRONIC KIDNEY DISEASE: Primary | ICD-10-CM

## 2025-05-30 ENCOUNTER — INFUSION (OUTPATIENT)
Dept: INFUSION THERAPY | Facility: HOSPITAL | Age: 80
End: 2025-05-30
Attending: INTERNAL MEDICINE
Payer: MEDICARE

## 2025-05-30 ENCOUNTER — LAB VISIT (OUTPATIENT)
Dept: LAB | Facility: HOSPITAL | Age: 80
End: 2025-05-30
Attending: INTERNAL MEDICINE
Payer: MEDICARE

## 2025-05-30 VITALS
RESPIRATION RATE: 18 BRPM | HEART RATE: 112 BPM | TEMPERATURE: 99 F | OXYGEN SATURATION: 98 % | SYSTOLIC BLOOD PRESSURE: 137 MMHG | DIASTOLIC BLOOD PRESSURE: 74 MMHG

## 2025-05-30 DIAGNOSIS — N18.30 ANEMIA IN STAGE 3 CHRONIC KIDNEY DISEASE: Primary | ICD-10-CM

## 2025-05-30 DIAGNOSIS — Z53.1 PROCEDURE AND TREATMENT NOT CARRIED OUT BECAUSE OF PATIENT'S DECISION FOR REASONS OF BELIEF AND GROUP PRESSURE: ICD-10-CM

## 2025-05-30 DIAGNOSIS — D63.1 ANEMIA IN STAGE 3 CHRONIC KIDNEY DISEASE: Primary | ICD-10-CM

## 2025-05-30 DIAGNOSIS — D63.1 ANEMIA IN CHRONIC KIDNEY DISEASE, UNSPECIFIED CKD STAGE: ICD-10-CM

## 2025-05-30 DIAGNOSIS — N18.9 ANEMIA IN CHRONIC KIDNEY DISEASE, UNSPECIFIED CKD STAGE: ICD-10-CM

## 2025-05-30 DIAGNOSIS — D63.1 ANEMIA IN STAGE 3A CHRONIC KIDNEY DISEASE: ICD-10-CM

## 2025-05-30 DIAGNOSIS — N18.31 ANEMIA IN STAGE 3A CHRONIC KIDNEY DISEASE: ICD-10-CM

## 2025-05-30 DIAGNOSIS — D50.9 IRON DEFICIENCY ANEMIA, UNSPECIFIED IRON DEFICIENCY ANEMIA TYPE: ICD-10-CM

## 2025-05-30 LAB
ABSOLUTE EOSINOPHIL (OHS): 0.05 K/UL
ABSOLUTE MONOCYTE (OHS): 0.48 K/UL (ref 0.3–1)
ABSOLUTE NEUTROPHIL COUNT (OHS): 2.94 K/UL (ref 1.8–7.7)
BASOPHILS # BLD AUTO: 0.02 K/UL
BASOPHILS NFR BLD AUTO: 0.5 %
ERYTHROCYTE [DISTWIDTH] IN BLOOD BY AUTOMATED COUNT: 13.9 % (ref 11.5–14.5)
HCT VFR BLD AUTO: 31 % (ref 37–48.5)
HGB BLD-MCNC: 10.4 GM/DL (ref 12–16)
IMM GRANULOCYTES # BLD AUTO: 0.03 K/UL (ref 0–0.04)
IMM GRANULOCYTES NFR BLD AUTO: 0.7 % (ref 0–0.5)
LYMPHOCYTES # BLD AUTO: 0.87 K/UL (ref 1–4.8)
MCH RBC QN AUTO: 33.9 PG (ref 27–31)
MCHC RBC AUTO-ENTMCNC: 33.5 G/DL (ref 32–36)
MCV RBC AUTO: 101 FL (ref 82–98)
NUCLEATED RBC (/100WBC) (OHS): 0 /100 WBC
PLATELET # BLD AUTO: 297 K/UL (ref 150–450)
PMV BLD AUTO: 8.9 FL (ref 9.2–12.9)
RBC # BLD AUTO: 3.07 M/UL (ref 4–5.4)
RELATIVE EOSINOPHIL (OHS): 1.1 %
RELATIVE LYMPHOCYTE (OHS): 19.8 % (ref 18–48)
RELATIVE MONOCYTE (OHS): 10.9 % (ref 4–15)
RELATIVE NEUTROPHIL (OHS): 67 % (ref 38–73)
WBC # BLD AUTO: 4.39 K/UL (ref 3.9–12.7)

## 2025-05-30 PROCEDURE — 36415 COLL VENOUS BLD VENIPUNCTURE: CPT

## 2025-05-30 PROCEDURE — 63600175 PHARM REV CODE 636 W HCPCS: Mod: JZ,EC,TB | Performed by: INTERNAL MEDICINE

## 2025-05-30 PROCEDURE — 85025 COMPLETE CBC W/AUTO DIFF WBC: CPT

## 2025-05-30 PROCEDURE — 96372 THER/PROPH/DIAG INJ SC/IM: CPT

## 2025-05-30 RX ADMIN — EPOETIN ALFA-EPBX 40000 UNITS: 40000 INJECTION, SOLUTION INTRAVENOUS; SUBCUTANEOUS at 12:05

## 2025-05-30 NOTE — PLAN OF CARE
Patient came onto unit using motorized wheelchair, VSS, no s/s of distress. Plan of care reviewed with patient. 40k retacrit injection given sub q left arm. Treatment was tolerated well. Calendar printed for appt reminder. Patient used motorized wheelchair to exit unit, no s/s of distress.

## 2025-05-31 ENCOUNTER — RESULTS FOLLOW-UP (OUTPATIENT)
Dept: UROLOGY | Facility: HOSPITAL | Age: 80
End: 2025-05-31

## 2025-06-06 ENCOUNTER — OFFICE VISIT (OUTPATIENT)
Dept: UROLOGY | Facility: CLINIC | Age: 80
End: 2025-06-06
Payer: MEDICARE

## 2025-06-06 VITALS — WEIGHT: 149 LBS | BODY MASS INDEX: 27.25 KG/M2

## 2025-06-06 DIAGNOSIS — N22 CALCULUS OF URINARY TRACT IN DISEASES CLASSIFIED ELSEWHERE: Primary | ICD-10-CM

## 2025-06-06 PROCEDURE — 99999 PR PBB SHADOW E&M-EST. PATIENT-LVL III: CPT | Mod: PBBFAC,HCNC,, | Performed by: STUDENT IN AN ORGANIZED HEALTH CARE EDUCATION/TRAINING PROGRAM

## 2025-06-09 DIAGNOSIS — E11.69 COMBINED HYPERLIPIDEMIA ASSOCIATED WITH TYPE 2 DIABETES MELLITUS: Chronic | ICD-10-CM

## 2025-06-09 DIAGNOSIS — E78.2 COMBINED HYPERLIPIDEMIA ASSOCIATED WITH TYPE 2 DIABETES MELLITUS: Chronic | ICD-10-CM

## 2025-06-09 NOTE — TELEPHONE ENCOUNTER
No care due was identified.  [facility] Embedded Care Due Messages. Reference number: [id number].   6/09/2025 11:51:09 AM CDT

## 2025-06-10 RX ORDER — FENOFIBRATE 160 MG/1
160 TABLET ORAL
Qty: 90 TABLET | Refills: 3 | Status: SHIPPED | OUTPATIENT
Start: 2025-06-10

## 2025-06-10 NOTE — TELEPHONE ENCOUNTER
Refill Routing Note   Medication(s) are not appropriate for processing by [facility] for the following reason(s):        Drug-disease interaction    [facility] action(s):  Defer      Medication Therapy Plan: Drug-Disease: fenofibrate and Myopathy; Muscle weakness    Pharmacist review requested: Yes     Appointments  past 12m or future 3m with PCP    Date Provider   Last Visit   3/29/2025 Micaela Mendoza MD   Next Visit   7/30/2025 Micaela Mendoza MD   ED visits in past 90 days: 0        Note composed:6:39 PM 06/10/2025

## 2025-06-11 NOTE — TELEPHONE ENCOUNTER
Refill Decision Note   Regino Lawrence  is requesting a refill authorization.  Brief Assessment and Rationale for Refill:  Approve     Medication Therapy Plan:         Pharmacist review requested: Yes   Extended chart review required: Yes   Comments:     Note composed:10:03 PM 06/10/2025

## 2025-06-12 DIAGNOSIS — G89.29 OTHER CHRONIC PAIN: Chronic | ICD-10-CM

## 2025-06-12 RX ORDER — PREGABALIN 75 MG/1
75 CAPSULE ORAL 2 TIMES DAILY
Qty: 60 CAPSULE | Refills: 5 | Status: SHIPPED | OUTPATIENT
Start: 2025-06-12 | End: 2025-12-11

## 2025-06-13 ENCOUNTER — LAB VISIT (OUTPATIENT)
Dept: LAB | Facility: HOSPITAL | Age: 80
End: 2025-06-13
Attending: INTERNAL MEDICINE
Payer: MEDICARE

## 2025-06-13 ENCOUNTER — INFUSION (OUTPATIENT)
Dept: INFUSION THERAPY | Facility: HOSPITAL | Age: 80
End: 2025-06-13
Attending: INTERNAL MEDICINE
Payer: MEDICARE

## 2025-06-13 VITALS
RESPIRATION RATE: 16 BRPM | OXYGEN SATURATION: 96 % | SYSTOLIC BLOOD PRESSURE: 139 MMHG | DIASTOLIC BLOOD PRESSURE: 67 MMHG | TEMPERATURE: 98 F | HEART RATE: 101 BPM

## 2025-06-13 DIAGNOSIS — D63.1 ANEMIA IN CHRONIC KIDNEY DISEASE, UNSPECIFIED CKD STAGE: ICD-10-CM

## 2025-06-13 DIAGNOSIS — N18.31 ANEMIA IN STAGE 3A CHRONIC KIDNEY DISEASE: ICD-10-CM

## 2025-06-13 DIAGNOSIS — D50.9 IRON DEFICIENCY ANEMIA, UNSPECIFIED IRON DEFICIENCY ANEMIA TYPE: ICD-10-CM

## 2025-06-13 DIAGNOSIS — D63.1 ANEMIA IN STAGE 3 CHRONIC KIDNEY DISEASE: Primary | ICD-10-CM

## 2025-06-13 DIAGNOSIS — Z53.1 PROCEDURE AND TREATMENT NOT CARRIED OUT BECAUSE OF PATIENT'S DECISION FOR REASONS OF BELIEF AND GROUP PRESSURE: ICD-10-CM

## 2025-06-13 DIAGNOSIS — N18.30 ANEMIA IN STAGE 3 CHRONIC KIDNEY DISEASE: ICD-10-CM

## 2025-06-13 DIAGNOSIS — N18.30 ANEMIA IN STAGE 3 CHRONIC KIDNEY DISEASE: Primary | ICD-10-CM

## 2025-06-13 DIAGNOSIS — N18.9 ANEMIA IN CHRONIC KIDNEY DISEASE, UNSPECIFIED CKD STAGE: ICD-10-CM

## 2025-06-13 DIAGNOSIS — D63.1 ANEMIA IN STAGE 3 CHRONIC KIDNEY DISEASE: ICD-10-CM

## 2025-06-13 DIAGNOSIS — D63.1 ANEMIA IN STAGE 3A CHRONIC KIDNEY DISEASE: ICD-10-CM

## 2025-06-13 LAB
ABSOLUTE EOSINOPHIL (OHS): 0.05 K/UL
ABSOLUTE MONOCYTE (OHS): 0.45 K/UL (ref 0.3–1)
ABSOLUTE NEUTROPHIL COUNT (OHS): 2.99 K/UL (ref 1.8–7.7)
BASOPHILS # BLD AUTO: 0.01 K/UL
BASOPHILS NFR BLD AUTO: 0.2 %
ERYTHROCYTE [DISTWIDTH] IN BLOOD BY AUTOMATED COUNT: 13.8 % (ref 11.5–14.5)
HCT VFR BLD AUTO: 33.3 % (ref 37–48.5)
HGB BLD-MCNC: 11.1 GM/DL (ref 12–16)
IMM GRANULOCYTES # BLD AUTO: 0.03 K/UL (ref 0–0.04)
IMM GRANULOCYTES NFR BLD AUTO: 0.7 % (ref 0–0.5)
LYMPHOCYTES # BLD AUTO: 0.81 K/UL (ref 1–4.8)
MCH RBC QN AUTO: 33.6 PG (ref 27–31)
MCHC RBC AUTO-ENTMCNC: 33.3 G/DL (ref 32–36)
MCV RBC AUTO: 101 FL (ref 82–98)
NUCLEATED RBC (/100WBC) (OHS): 0 /100 WBC
PLATELET # BLD AUTO: 255 K/UL (ref 150–450)
PMV BLD AUTO: 8.4 FL (ref 9.2–12.9)
RBC # BLD AUTO: 3.3 M/UL (ref 4–5.4)
RELATIVE EOSINOPHIL (OHS): 1.2 %
RELATIVE LYMPHOCYTE (OHS): 18.7 % (ref 18–48)
RELATIVE MONOCYTE (OHS): 10.4 % (ref 4–15)
RELATIVE NEUTROPHIL (OHS): 68.8 % (ref 38–73)
WBC # BLD AUTO: 4.34 K/UL (ref 3.9–12.7)

## 2025-06-13 PROCEDURE — 36415 COLL VENOUS BLD VENIPUNCTURE: CPT | Mod: HCNC

## 2025-06-13 PROCEDURE — 63600175 PHARM REV CODE 636 W HCPCS: Mod: JZ,EC,TB,HCNC | Performed by: INTERNAL MEDICINE

## 2025-06-13 PROCEDURE — 96372 THER/PROPH/DIAG INJ SC/IM: CPT | Mod: HCNC

## 2025-06-13 PROCEDURE — 85025 COMPLETE CBC W/AUTO DIFF WBC: CPT | Mod: HCNC

## 2025-06-13 RX ADMIN — EPOETIN ALFA-EPBX 40000 UNITS: 40000 INJECTION, SOLUTION INTRAVENOUS; SUBCUTANEOUS at 11:06

## 2025-06-13 NOTE — PLAN OF CARE
Patient entered unit using motorized wheelchair, VSS, no s/s of distress, however, patient c/o tiredness. Plan of care reviewed with patient. Retacrit 40k given sub q via right arm. Patient tolerated well. Appointment reminder given. Patient left unit using motorized wheelchair, no s/s of distress.

## 2025-06-13 NOTE — PLAN OF CARE
Pt has pulmonary HTN with PAP of 53.  Estrella Ferguson with Dr. Dunham's office notified.    Estrellita De Souza RN BSN

## 2025-06-16 ENCOUNTER — TELEPHONE (OUTPATIENT)
Dept: ORTHOPEDICS | Facility: CLINIC | Age: 80
End: 2025-06-16
Payer: MEDICARE

## 2025-06-17 ENCOUNTER — DOCUMENTATION ONLY (OUTPATIENT)
Dept: ORTHOPEDICS | Facility: CLINIC | Age: 80
End: 2025-06-17
Payer: MEDICARE

## 2025-06-17 ENCOUNTER — TELEPHONE (OUTPATIENT)
Dept: ORTHOPEDICS | Facility: CLINIC | Age: 80
End: 2025-06-17
Payer: MEDICARE

## 2025-06-17 ENCOUNTER — TELEPHONE (OUTPATIENT)
Dept: FAMILY MEDICINE | Facility: CLINIC | Age: 80
End: 2025-06-17
Payer: MEDICARE

## 2025-06-17 NOTE — TELEPHONE ENCOUNTER
Attempt to contact Regino for an appointment Pre-Op Clearance For Surgery on 6/20. No answer. No voicemail available.

## 2025-06-17 NOTE — PROGRESS NOTES
Sent message to Dr. Mendoza's staff to schedule a pre-op clearance appointment. Marked the message as urgent.

## 2025-06-17 NOTE — TELEPHONE ENCOUNTER
I spoke with patient and let her know that the pre-op center did not have anymore available appointments prior to her surgery on 6/20. I called the primary care department and they have put in a message to her primary care doctor to get an appointment before surgery.    I let the patient know they would be calling. Patient confirmed understanding.

## 2025-06-17 NOTE — TELEPHONE ENCOUNTER
Copied from CRM #[id number]. Topic: General Inquiry - Patient Advice  >> Jun 17, 2025  2:35 PM Terri wrote:  .1MEDICALADVICE     Patient is calling for Medical Advice regarding:Dr Dunham office is calling to get an appt for the pt for a clearance her surgery is on 06/20     How long has patient had these symptoms:    Pharmacy name and phone#:    [facility] provide patient's call back phone number:  [phone]  Comments:  She states they have been trying to get to the pt and they finally were able to connect with her today   Please advise patient replies from provider may take up to 48 hours.

## 2025-06-17 NOTE — TELEPHONE ENCOUNTER
----- Message from Nita De La Rosa sent at 6/17/2025  3:10 PM CDT -----  Regarding: Pre-Op Clearance For Surgery on 6/20  [facility]!    The patient is scheduled for trigger finger release surgery on 6/20/2025 with Dr. Dunham.     Medical clearance is indicated prior to this. Would you be able to help the patient in that regard? The anesthesia team will require documentation in the chart regarding clearance status and perioperative medical recommendations.    Thank you for your help & let me know if I can assist in any way!  Estrella Ferguson MS, OTC  Clinical/ OR Assistant- Dr. Chon Dunham MD  [facility]  [phone]

## 2025-06-17 NOTE — TELEPHONE ENCOUNTER
Spoke with patient and let her know that the pre-op center has been trying to reach her since 5/22 to schedule an appointment. Patient states she has not received their calls. This is the second time I have spoke to this patient about her pre-op appointment.     I notified the pre-op center the first time that I spoke to her on 6/9 and to call the number [phone].     I notified the pre-op center again on 6/17 today that I spoke to her and she is still waiting for a call.

## 2025-06-17 NOTE — PLAN OF CARE
Pt taking Eliquis. RN messaged Dr. Micaela Mendoza for pre op instructions for Eliquis.  Dr. Mendoza states pt may hold Eliquis for 2 days prior to surgery. RN called pt and instructed to hold Eliquis for 2D prior to surgery.  Pt verbalized understanding.  Pt states she's not received a call from pre op center yet to schedule pre op clearance appt.    Per Estrella with Dr. Dunham's office, they were not able to get pt an appointment at pre op center for clearance, so they are going to try to schedule a PCP visit.    Estrellita De Souza RN BSN

## 2025-06-18 ENCOUNTER — OFFICE VISIT (OUTPATIENT)
Dept: FAMILY MEDICINE | Facility: CLINIC | Age: 80
End: 2025-06-18
Payer: MEDICARE

## 2025-06-18 ENCOUNTER — TELEPHONE (OUTPATIENT)
Dept: CARDIOLOGY | Facility: CLINIC | Age: 80
End: 2025-06-18
Payer: MEDICARE

## 2025-06-18 ENCOUNTER — APPOINTMENT (OUTPATIENT)
Dept: RADIOLOGY | Facility: HOSPITAL | Age: 80
End: 2025-06-18
Attending: NURSE PRACTITIONER
Payer: MEDICARE

## 2025-06-18 ENCOUNTER — ANESTHESIA EVENT (OUTPATIENT)
Dept: SURGERY | Facility: HOSPITAL | Age: 80
End: 2025-06-18
Payer: MEDICARE

## 2025-06-18 ENCOUNTER — PATIENT MESSAGE (OUTPATIENT)
Dept: PREADMISSION TESTING | Facility: HOSPITAL | Age: 80
End: 2025-06-18
Payer: MEDICARE

## 2025-06-18 VITALS
DIASTOLIC BLOOD PRESSURE: 72 MMHG | RESPIRATION RATE: 18 BRPM | OXYGEN SATURATION: 92 % | SYSTOLIC BLOOD PRESSURE: 144 MMHG | HEIGHT: 62 IN | HEART RATE: 101 BPM | BODY MASS INDEX: 27.87 KG/M2 | WEIGHT: 151.44 LBS | TEMPERATURE: 98 F

## 2025-06-18 DIAGNOSIS — N18.31 STAGE 3A CHRONIC KIDNEY DISEASE: ICD-10-CM

## 2025-06-18 DIAGNOSIS — R06.09 DOE (DYSPNEA ON EXERTION): ICD-10-CM

## 2025-06-18 DIAGNOSIS — I27.20 PULMONARY HYPERTENSION: ICD-10-CM

## 2025-06-18 DIAGNOSIS — R06.09 DOE (DYSPNEA ON EXERTION): Primary | ICD-10-CM

## 2025-06-18 DIAGNOSIS — E11.42 CONTROLLED TYPE 2 DIABETES MELLITUS WITH DIABETIC POLYNEUROPATHY, WITH LONG-TERM CURRENT USE OF INSULIN: ICD-10-CM

## 2025-06-18 DIAGNOSIS — Z79.4 CONTROLLED TYPE 2 DIABETES MELLITUS WITH DIABETIC POLYNEUROPATHY, WITH LONG-TERM CURRENT USE OF INSULIN: ICD-10-CM

## 2025-06-18 PROCEDURE — 71046 X-RAY EXAM CHEST 2 VIEWS: CPT | Mod: 26,HCNC,, | Performed by: RADIOLOGY

## 2025-06-18 PROCEDURE — 1159F MED LIST DOCD IN RCRD: CPT | Mod: CPTII,HCNC,S$GLB, | Performed by: NURSE PRACTITIONER

## 2025-06-18 PROCEDURE — 3288F FALL RISK ASSESSMENT DOCD: CPT | Mod: CPTII,HCNC,S$GLB, | Performed by: NURSE PRACTITIONER

## 2025-06-18 PROCEDURE — G2211 COMPLEX E/M VISIT ADD ON: HCPCS | Mod: HCNC,S$GLB,, | Performed by: NURSE PRACTITIONER

## 2025-06-18 PROCEDURE — 99214 OFFICE O/P EST MOD 30 MIN: CPT | Mod: HCNC,S$GLB,, | Performed by: NURSE PRACTITIONER

## 2025-06-18 PROCEDURE — 1101F PT FALLS ASSESS-DOCD LE1/YR: CPT | Mod: CPTII,HCNC,S$GLB, | Performed by: NURSE PRACTITIONER

## 2025-06-18 PROCEDURE — 3078F DIAST BP <80 MM HG: CPT | Mod: CPTII,HCNC,S$GLB, | Performed by: NURSE PRACTITIONER

## 2025-06-18 PROCEDURE — 1157F ADVNC CARE PLAN IN RCRD: CPT | Mod: CPTII,HCNC,S$GLB, | Performed by: NURSE PRACTITIONER

## 2025-06-18 PROCEDURE — 71046 X-RAY EXAM CHEST 2 VIEWS: CPT | Mod: TC,HCNC,FY,PN

## 2025-06-18 PROCEDURE — 1125F AMNT PAIN NOTED PAIN PRSNT: CPT | Mod: CPTII,HCNC,S$GLB, | Performed by: NURSE PRACTITIONER

## 2025-06-18 PROCEDURE — 99999 PR PBB SHADOW E&M-EST. PATIENT-LVL V: CPT | Mod: PBBFAC,HCNC,, | Performed by: NURSE PRACTITIONER

## 2025-06-18 PROCEDURE — 3077F SYST BP >= 140 MM HG: CPT | Mod: CPTII,HCNC,S$GLB, | Performed by: NURSE PRACTITIONER

## 2025-06-18 NOTE — ANESTHESIA PAT ROS NOTE
06/18/2025  Regino Lawrence is a 79 y.o., female.      Pre-op Assessment    I have reviewed the Patient Summary Reports.       I have reviewed the Medications.   Steroids Taken In Past Year: Prednisone    Review of Systems  Anesthesia Hx:    11/16/15 pt was hypertensive post anesthesia. History of prior surgery of interest to airway management or planning: cervical fusion. Previous anesthesia: General, MAC Colpocleisis 11/28/26 with general anesthesia.  Procedure performed at an [facility].      Phacoemulsification with MAC.  Procedure performed at an [facility]. Airway issues documented on chart review include mask, easy, GETA, easy direct laryngoscopy , view on direct laryngoscopy Grade I       Social:  Non-Smoker, No Alcohol Use       Hematology/Oncology:       -- Anemia:               Hematology Comments: Yarsanism  Gets Retacrit injections@ [facility] chemo infusions.                    Cardiovascular:     Hypertension           hyperlipidemia FAUSTIN   Chart documents that with Pulmonary HTN, pt's FAUSTIN is likely pulmonary in nature, not cardiac Patient on beta blockers                          Pulmonary:   COPD   Shortness of breath   Pulmonary HTN  Sarcoidosis  Immunosuppression d/t chronic steroid use (prednisone)  Hx PE (on Eliquis)               Renal/:  Chronic Renal Disease, CKD renal calculi               Hepatic/GI:     GERD                Musculoskeletal:  Arthritis   Trigger finger of all digits of left hand  Pain of left hand  Bilateral carpal tunnel syndrome  Cervical spinal stenosis  S/P cervical spinal fusion  Hx T4 vertebral fracture         Spine Disorders: cervical and lumbar Disc disease, Degenerative disease and Chronic Pain           Neurological:       Seizures    Dementia      Peripheral Neuropathy                          Endocrine:  Diabetes, well controlled, type 2  Hypothyroidism  Osteopenia        Psych:   anxiety depression                 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, without long-term current use of insulin     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, without long-term current use of insulin     Dementia, unspecified dementia severity, unspecified dementia type, unspecified whether behavioral, psychotic, or mood disturbance or anxiety 2025    Depression     Diabetes mellitus, type 2     Eye injury as a child     k-abrasion  od    Hyperlipidemia     Hypertension     Hypothyroidism     Immune deficiency disorder     Immune disorder     LOC (loss of consciousness) 2021    at home    Myalgia and myositis 2012    Osteoporosis     Polyneuropathy     Pulmonary embolism 07/10/2021    Renal manifestation of secondary diabetes mellitus     Sarcoidosis     Seizure     Type 2 diabetes mellitus     Ulcer     no cancer    Urinary incontinence      Past Surgical History:   Procedure Laterality Date    CARPAL TUNNEL RELEASE      Rt wrist    CATARACT EXTRACTION W/  INTRAOCULAR LENS IMPLANT Right 2015    Dr. Azevedo    CATARACT EXTRACTION W/  INTRAOCULAR LENS IMPLANT Left 2015    Dr. Azevedo    CERVICAL SPINE SURGERY       SECTION      CHOLECYSTECTOMY      INJECTION OF ANESTHETIC AGENT AROUND NERVE Left 2020    Procedure: BLOCK, NERVE LEFT FEMORAL AND OBTURATOR;  Surgeon: Alfonso Richards MD;  Location: [facility];  Service: Pain Management;  Laterality: Left;  NEEDS CONSENT    INJECTION OF ANESTHETIC AGENT AROUND NERVE Bilateral 10/09/2023    Procedure: BLOCK, NERVE, BILATERAL L3-L4-L5 MEDIAL BRANCH;   Surgeon: Alfonso Richards MD;  Location: [facility];  Service: Pain Management;  Laterality: Bilateral;    INJECTION OF JOINT Left 03/21/2019    Procedure: Injection, Joint  fLUOROSCOPIC jOINT iNJECTION (hIP iNJECTION) LEFT ROCH BURSA AS WELL LEFT TROCHANTERIC BURSA;  Surgeon: Alfonso Richards MD;  Location: [facility];  Service: Pain Management;  Laterality: Left;  NEEDS CONSENT, DIABETIC    INJECTION OF JOINT Left 07/22/2019    Procedure: Injection, Joint FLUOROSCOPIC JOINT INJECTION (HIP INJECTION) LEFT HIP;  Surgeon: Alfonso Richards MD;  Location: [facility];  Service: Pain Management;  Laterality: Left;  NEEDS CONSENT    INJECTION OF JOINT Left 09/12/2019    Procedure: INJECTION, JOINT;  Surgeon: Alfonso Rcihards MD;  Location: [facility];  Service: Pain Management;  Laterality: Left;  Left Hip and Left GTB Injections    INJECTION OF JOINT Left 07/27/2020    Procedure: INJECTION, JOINT, LEFT HIP and LEFT GREATER TROCHANTERIC BURSA;  Surgeon: Alfonso Richards MD;  Location: [facility];  Service: Pain Management;  Laterality: Left;  INJECTION, JOINT, LEFT HIP and LEFT GREATER TROCHANTERIC BURSA    INJECTION OF JOINT Left 09/03/2020    Procedure: INJECTION, JOINT, LEFT SI;  Surgeon: Alfonso Richards MD;  Location: [facility];  Service: Pain Management;  Laterality: Left;  INJECTION, JOINT, LEFT SI    TRANSFORAMINAL EPIDURAL INJECTION OF STEROID Bilateral 12/05/2019    Procedure: INJECTION, STEROID, EPIDURAL, TRANSFORAMINAL APPROACH;  Surgeon: Alfonso Richards MD;  Location: [facility];  Service: Pain Management;  Laterality: Bilateral;  B/L TF RHIANNA L5  Consent Needed    TRANSFORAMINAL EPIDURAL INJECTION OF STEROID Bilateral 06/29/2020    Procedure: INJECTION, STEROID, EPIDURAL, TRANSFORAMINAL APPROACH L5/S1;  Surgeon: Alfonso Richards MD;  Location: [facility];  Service: Pain Management;  Laterality: Bilateral;  B/L TF RHIANNA L5/S1    TUBAL LIGATION       Anesthesia Assessment: Preoperative EQUATION    Planned Procedure:  Procedure(s) (LRB):  RELEASE, TRIGGER FINGER (Left)  Requested Anesthesia Type:Regional  Surgeon: Chon Dunham MD  Service: Orthopedics  Known or anticipated Date of Surgery:6/20/2025    Surgeon notes: reviewed    Electronic QUestionnaire Assessment completed via nurse interview with patient.      Triage considerations:     The patient has no apparent active cardiac condition (No unstable coronary Syndrome such as severe unstable angina or recent [<1 month] myocardial infarction, decompensated CHF, severe valvular   disease or significant arrhythmia)    Previous anesthesia records:GETA, MAC, and Htn after 11/16/25 procedure documented on chart.    Last PCP note: within 3 months , within [facility]   Subspecialty notes: Cardiology: General, Gastroenterology, Hematology/Oncology, Nephrology, Neurology, Ortho, Pain Management, Rheumatology, Urology    Other important co-morbidities: COPD, DM2, GERD, HLD, HTN, and Hypothyroid   EKG 3/24/25  Vent. Rate :  88 BPM     Atrial Rate :  88 BPM      P-R Int : 190 ms          QRS Dur :  86 ms       QT Int : 420 ms       P-R-T Axes :  39  -6  20 degrees     QTcB Int : 508 ms     Normal sinus rhythm   Prolonged QT   Abnormal ECG   When compared with ECG of 02-Oct-2024 09:18,   No significant change was found   Confirmed by Ji Eng ([id number]) on 3/24/2025 9:06:17 AM     Nuclear stress test 10/2/24    Normal myocardial perfusion scan. There is no evidence of myocardial ischemia or infarction.    The gated perfusion images showed an ejection fraction of 71% at rest.    The ECG portion of the study is negative for ischemia.    The patient reported no chest pain during the stress test.    There were no arrhythmias during stress.    TTE 7/16/25    Left Ventricle: The left ventricle is normal in size. Normal wall thickness. There is concentric remodeling. There is normal systolic function with a visually estimated ejection fraction of 60 - 65%. Grade I diastolic dysfunction.    Right  "Ventricle: The right ventricle is normal in size measuring 3.3 cm. Systolic function is normal.    Tricuspid Valve: There is mild to moderate regurgitation.    Pulmonary Artery: The estimated pulmonary artery systolic pressure is 39 mmHg.      Tests already available:  Results have been reviewed.             Instructions given. (See in Nurse's note) Pre op medication instructions sent via portal message.  RN instructed pt in pre op medications via phone - pt verbalized understanding    Optimization: Medical Opinion Indicated       Plan:  Patient  has previously scheduled Medical Appointment: PCP for clearance 6/18/25.  Repeat Echo has been requested to see if PAP improved.  Per results of Echo 7/16/25, pt's PAP was 39, which is an appropriate PAP result for pt having surgery at [facility].     Navigation: Consults scheduled.    Ht: 5'2"  Wt: 67.6 kg (149 lb)  BMI: 27.25  "

## 2025-06-18 NOTE — PROGRESS NOTES
Routine Office Visit    Patient Name: Regino Lawrence    : 1945  MRN: [id number]    Chief Complaint:  Preop exam    Subjective:  Regino is a 79 y.o. female who presents today for:    Preop exam - patient who is known to me with medical history as documented below reports today for evaluation.  Here for preop exam for trigger finger release later this week.  She has sarcoidosis on daily steroids and pulmonary artery hypertension.  Last PA pressure done via echo last year was in the 50s.  She endorses persistent exertional dyspnea with minimal activity such as standing up.  She notes it has worsened over the last 3-6 months.  She denies any leg swelling.  Denies any chest pain.  She states that she has a cough and wheezes from time to time but overall she denies any chronic cough or wheezing.  She does use albuterol p.r.n. for the dyspnea.    She reports compliance with her Lantus insulin.  Last A1c was well-controlled at 5.2.    Her BP is elevated today.  She has been holding her medications in anticipation for her surgery.    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, without long-term current use of insulin     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, without long-term current use of insulin     Dementia, unspecified dementia severity, unspecified dementia type, unspecified whether behavioral, psychotic, or mood disturbance or anxiety 2025    Depression     Diabetes mellitus, type 2     Eye injury as a child     k-abrasion  od    Hyperlipidemia     Hypertension     Hypothyroidism     Immune deficiency disorder     Immune disorder     LOC (loss of  consciousness) 03/12/2021    at home    Myalgia and myositis 09/06/2012    Osteoporosis     Polyneuropathy     Pulmonary embolism 07/10/2021    Renal manifestation of secondary diabetes mellitus     Sarcoidosis     Seizure     Type 2 diabetes mellitus     Ulcer     no cancer    Urinary incontinence        Family History  Family History   Problem Relation Name Age of Onset    Hypertension Mother Martial     Cataracts Mother Martial     No Known Problems Father      Hypertension Maternal Grandmother Nora     Glaucoma Sister Cristina     Arthritis Sister Cristina     No Known Problems Brother Kofi     No Known Problems Maternal Aunt      No Known Problems Maternal Uncle      No Known Problems Paternal Aunt      No Known Problems Paternal Uncle      No Known Problems Maternal Grandfather      No Known Problems Paternal Grandmother      No Known Problems Paternal Grandfather      Kidney failure Sister Rita     Hepatitis Sister Deepali     Cancer Sister Deepali         bone cancer     Immunodeficiency Sister Christiana     Diabetes Son x4     Hypertension Son x4     Lupus Neg Hx      Rheum arthritis Neg Hx      Amblyopia Neg Hx      Blindness Neg Hx      Macular degeneration Neg Hx      Retinal detachment Neg Hx      Strabismus Neg Hx      Stroke Neg Hx      Thyroid disease Neg Hx      Endometrial cancer Neg Hx      Vaginal cancer Neg Hx      Cervical cancer Neg Hx         Current Medications  Medications Ordered Prior to Encounter[1]    Allergies   Review of patient's allergies indicates:   Allergen Reactions    Azathioprine Shortness Of Breath and Other (See Comments)     Fatigue       Review of Systems (Pertinent positives)  Review of Systems   Constitutional: Negative.  Negative for chills and fever.   HENT: Negative.  Negative for congestion, sinus pain and sore throat.    Eyes: Negative.    Respiratory:  Positive for shortness of breath (chronic, worsening) and wheezing (mild). Negative for cough (mild, intermittent).  "   Cardiovascular:  Negative for chest pain, palpitations, orthopnea and claudication.   Gastrointestinal: Negative.  Negative for abdominal pain, diarrhea, nausea and vomiting.   Genitourinary: Negative.  Negative for dysuria, frequency and urgency.   Musculoskeletal: Negative.  Negative for back pain, joint pain and neck pain.   Skin: Negative.    Neurological: Negative.  Negative for dizziness, tingling, loss of consciousness and headaches.   Endo/Heme/Allergies: Negative.    Psychiatric/Behavioral: Negative.         BP (!) 144/72 (BP Location: Right arm, Patient Position: Sitting)   Pulse 101   Temp 98 °F (36.7 °C) (Oral)   Resp 18   Ht 5' 2" (1.575 m)   Wt 68.7 kg (151 lb 7.3 oz)   LMP  (LMP Unknown)   SpO2 (!) 92%   BMI 27.70 kg/m²     Physical Exam  Vitals reviewed.   Constitutional:       General: She is not in acute distress.     Appearance: Normal appearance. She is not ill-appearing, toxic-appearing or diaphoretic.   HENT:      Head: Normocephalic and atraumatic.   Cardiovascular:      Rate and Rhythm: Normal rate and regular rhythm.      Pulses: Normal pulses.      Heart sounds: Normal heart sounds.   Pulmonary:      Effort: Pulmonary effort is normal. No respiratory distress.      Breath sounds: Normal breath sounds. No wheezing.   Abdominal:      General: Bowel sounds are normal. There is no distension.      Palpations: Abdomen is soft.      Tenderness: There is no abdominal tenderness.   Musculoskeletal:         General: No swelling, tenderness or deformity. Normal range of motion.   Skin:     General: Skin is warm and dry.      Capillary Refill: Capillary refill takes less than 2 seconds.   Neurological:      General: No focal deficit present.      Mental Status: She is alert and oriented to person, place, and time.   Psychiatric:         Mood and Affect: Mood normal.         Behavior: Behavior normal.            Assessment/Plan:  Regino Lawrence is a 79 y.o. female who presents today for " :    Regino was seen today for pre-op exam.    Diagnoses and all orders for this visit:    FAUSTIN (dyspnea on exertion)  -     Echo; Future  -     X-Ray Chest PA And Lateral; Future    Given patient's significant dyspnea on exertion will defer any clearance for her procedure at this time which she understands.  Received message from orthopedic team and Anesthesiology today stating that patient needs repeat echo done prior to her procedure.  I have placed this order.  Will check chest x-ray.  Dyspnea seems to be more related to her cardiac status as she does not have any serious cough or sputum production.  Recommended patient to get in with her cardiologist for preop clearance.  Message sent to cardiology team.    Pulmonary hypertension  -     Echo; Future    Check echo as above.    Stage 3a chronic kidney disease    Check kidney function with upcoming lab work.    Controlled type 2 diabetes mellitus with diabetic polyneuropathy, with long-term current use of insulin  -     Comprehensive Metabolic Panel; Future  -     Hemoglobin A1C; Future    Check A1c prior to surgery.        This office note has been dictated.  This dictation has been generated using M-Modal Fluency Direct dictation; some phonetic errors may occur.          [1]   Current Outpatient Medications on File Prior to Visit   Medication Sig Dispense Refill    (Magic mouthwash) 1:1:1 diphenhydrAMINE(Benadryl) 12.5mg/5ml liq, aluminum & magnesium hydroxide-simethicone (Maalox), LIDOcaine viscous 2% Swish and spit 5 mLs every 4 (four) hours as needed (mouth pain). for mouth sores 150 mL 0    ACCU-CHEK FASTCLIX LANCING DEV Kit USE AS DIRECTED. 1 each 0    blood sugar diagnostic (ACCU-CHEK SMARTVIEW TEST STRIP) Strp Use as directed to check blood sugar twice daily. 200 strip 3    blood sugar diagnostic Strp To check BG three times daily, to use with insurance preferred meter 300 each 3    blood-glucose meter kit Use as instructed 1 each 0    diclofenac sodium  "(VOLTAREN) 1 % Gel Apply 2 g topically once daily. 100 g 3    folic acid (FOLVITE) 1 MG tablet Take 1 tablet (1,000 mcg total) by mouth once daily. 90 tablet 1    levETIRAcetam (KEPPRA) 750 MG Tab Take 1 tablet (750 mg total) by mouth 2 (two) times daily. 60 tablet 11    levothyroxine (SYNTHROID) 50 MCG tablet Take 1 tablet (50 mcg total) by mouth before breakfast. 90 tablet 3    linaCLOtide (LINZESS) 72 mcg Cap capsule Take 1 capsule (72 mcg total) by mouth before breakfast. 90 capsule 3    magnesium 200 mg Tab 1 pill by mouth daily 4 each 0    memantine (NAMENDA) 5 MG Tab Take 1 tablet (5 mg total) by mouth 2 (two) times daily. 60 tablet 11    NIFEdipine (PROCARDIA-XL) 60 MG (OSM) 24 hr tablet Take 1 tablet (60 mg total) by mouth 2 (two) times a day. 180 tablet 1    pantoprazole (PROTONIX) 40 MG tablet Take 1 tablet (40 mg total) by mouth once daily. 90 tablet 3    pen needle, diabetic (BD ULTRA-FINE ANA PEN NEEDLE) 32 gauge x 5/32" Ndle For use with insulin pen 100 each 3    potassium chloride SA (K-DUR,KLOR-CON M) 10 MEQ tablet Take 1 tablet (10 mEq total) by mouth 2 (two) times daily. 4 tablet 0    predniSONE (DELTASONE) 1 MG tablet Take 4 tablets (4 mg total) by mouth once daily. 120 tablet 2    pregabalin (LYRICA) 75 MG capsule Take 1 capsule (75 mg total) by mouth 2 (two) times daily. 60 capsule 5    tiZANidine (ZANAFLEX) 2 MG tablet Take 2 tablets (4 mg total) by mouth every 8 (eight) hours as needed (muscle spasm). 270 tablet 1    albuterol-ipratropium (DUO-NEB) 2.5 mg-0.5 mg/3 mL nebulizer solution Take 3 mLs by nebulization every 4 (four) hours as needed for Wheezing or Shortness of Breath. Rescue 90 each 11    alpha lipoic acid 600 mg Cap Take 600 mg by mouth once daily. (Patient not taking: Reported on 6/18/2025) 60 each 3    apixaban (ELIQUIS) 5 mg Tab Take 1 tablet (5 mg total) by mouth 2 (two) times daily. Start this prescription after finishing starter pack (Patient not taking: Reported on " 6/18/2025) 60 tablet 5    atorvastatin (LIPITOR) 20 MG tablet Take 1 tablet (20 mg total) by mouth once daily. (Patient not taking: Reported on 6/18/2025) 90 tablet 3    calcium carb/vit D3/minerals (CALCIUM-VITAMIN D ORAL) Take 1 tablet by mouth once daily. (Patient not taking: Reported on 6/18/2025)      carvediloL (COREG) 25 MG tablet Take 1 tablet (25 mg total) by mouth 2 (two) times daily. (Patient not taking: Reported on 6/18/2025) 180 tablet 3    cetirizine (ZYRTEC) 10 MG tablet Take 1 tablet (10 mg total) by mouth once daily. (Patient not taking: Reported on 6/18/2025) 30 tablet 3    clotrimazole (LOTRIMIN) 1 % cream Apply topically 2 (two) times daily. (Patient not taking: Reported on 6/18/2025) 45 g 0    cyanocobalamin, vitamin B-12, (VITAMIN B-12 ORAL) Take 1 tablet by mouth once daily. (Patient not taking: Reported on 6/18/2025)      DULoxetine (CYMBALTA) 60 MG capsule Take 1 capsule (60 mg total) by mouth once daily. (Patient not taking: Reported on 6/18/2025) 90 capsule 3    fenofibrate 160 MG Tab Take 1 tablet by mouth once daily (Patient not taking: Reported on 6/18/2025) 90 tablet 3    fluticasone propion-salmeterol 115-21 mcg/dose (ADVAIR HFA) 115-21 mcg/actuation HFAA inhaler Inhale 2 puffs into the lungs every 12 (twelve) hours. Controller 12 g 3    insulin glargine U-100, Lantus, (LANTUS SOLOSTAR U-100 INSULIN) 100 unit/mL (3 mL) InPn pen Inject 30 Units into the skin 2 (two) times a day. 54 mL 1    [DISCONTINUED] gabapentin (NEURONTIN) 400 MG capsule Take 1 capsule (400 mg total) by mouth 3 (three) times daily 90 capsule 1     Current Facility-Administered Medications on File Prior to Visit   Medication Dose Route Frequency Provider Last Rate Last Admin    denosumab (PROLIA) injection 60 mg  60 mg Subcutaneous Q6 Months    60 mg at 12/23/24 8632

## 2025-06-18 NOTE — TELEPHONE ENCOUNTER
----- Message from Monico Starkey NP sent at 6/18/2025 11:17 AM CDT -----  Hey Dr. Bah staff,    Can y'all call this to get her set up with a preop appt with Dr. Bah for trigger finger release? She has chronic dyspnea with minimal exertion. She will need a repeat echo done prior which I have ordered. She will likely need to reschedule her surgery which she understands due to her symptoms.

## 2025-06-18 NOTE — LETTER
April 3, 2019      Micaela Mendoza MD  [address]  [place] [address]           [facility]  [address] [facility]  [place] [address]  Phone: [phone]          Patient: Regino Lawrence   MR Number: [id number]   YOB: 1945   Date of Visit: 4/3/2019       Dear Dr. Micaela Mendoza:    Thank you for referring Regino Lawrence to me for evaluation. Attached you will find relevant portions of my assessment and plan of care.    If you have questions, please do not hesitate to call me. I look forward to following Regino Lawrence along with you.    Sincerely,    Josh Bah MD    Enclosure  CC:  No Recipients    If you would like to receive this communication electronically, please contact [email or web address] or [phone] to request more information on [employer] access.    For providers and/or their staff who would like to refer a patient to [facility], please contact us through our one-stop-shop provider referral line, [facility], at [phone].    If you feel you have received this communication in error or would no longer like to receive these types of communications, please e-mail [email or web address]          Follow up with your back specialist if you do not feel better in 1 week    Go to the ER if you have worse pain, leg weakness, or trouble controlling your bowels or bladder

## 2025-06-19 ENCOUNTER — TELEPHONE (OUTPATIENT)
Dept: CARDIOLOGY | Facility: CLINIC | Age: 80
End: 2025-06-19
Payer: MEDICARE

## 2025-06-19 ENCOUNTER — TELEPHONE (OUTPATIENT)
Dept: PREADMISSION TESTING | Facility: HOSPITAL | Age: 80
End: 2025-06-19
Payer: MEDICARE

## 2025-06-19 ENCOUNTER — RESULTS FOLLOW-UP (OUTPATIENT)
Dept: FAMILY MEDICINE | Facility: CLINIC | Age: 80
End: 2025-06-19

## 2025-06-19 ENCOUNTER — TELEPHONE (OUTPATIENT)
Dept: ORTHOPEDICS | Facility: CLINIC | Age: 80
End: 2025-06-19
Payer: MEDICARE

## 2025-06-19 NOTE — TELEPHONE ENCOUNTER
----- Message from Med Assistant Violeta sent at 6/19/2025  2:11 PM CDT -----  Regarding: FW: Pre-Op Clearance for Surgery  Please advise  ----- Message -----  From: Estrella Ferguson, Patient Care Assistant  Sent: 6/19/2025   7:48 AM CDT  To: [facility] ([facility]) Scheduling; [place] #  Subject: Pre-Op Clearance for Surgery                     Hello!    The patient is scheduled for trigger finger release of all fingers of left hand surgery with Dr. Dunham.     Medical clearance is indicated prior to this. Would you be able to help the patient in that regard? The anesthesia team will require documentation in the chart regarding clearance status and perioperative medical recommendations.     Thank you for your help & let me know if I can assist in any way!  Estrella Ferguson, MS, OTC  Clinical/ OR Assistant- Dr. Chon Dunham MD  [facility]  [phone]

## 2025-06-19 NOTE — TELEPHONE ENCOUNTER
Spoke with patient and let her know she needs clearance by her cardiologist before proceeding with surgery. I let her know that her surgery has been postponed. Patient stated she would call back once her appointments are scheduled for clearance for a new surgery date. Patient confirmed understanding.

## 2025-06-20 ENCOUNTER — ANESTHESIA (OUTPATIENT)
Dept: SURGERY | Facility: HOSPITAL | Age: 80
End: 2025-06-20
Payer: MEDICARE

## 2025-06-24 ENCOUNTER — TELEPHONE (OUTPATIENT)
Dept: ORTHOPEDICS | Facility: CLINIC | Age: 80
End: 2025-06-24
Payer: MEDICARE

## 2025-06-24 NOTE — TELEPHONE ENCOUNTER
----- Message from Chon Dunham MD sent at 6/24/2025 11:59 AM CDT -----  Lets offer 7/23  ----- Message -----  From: Estrella Ferguson, Patient Care Assistant  Sent: 6/24/2025  11:52 AM CDT  To: Chon Dunham MD    Patient is scheduled for pre-op clearance on 7/15 and an ECHO on 7/16.     She would like a new surgery date.

## 2025-07-03 ENCOUNTER — INFUSION (OUTPATIENT)
Dept: INFUSION THERAPY | Facility: HOSPITAL | Age: 80
End: 2025-07-03
Attending: INTERNAL MEDICINE
Payer: MEDICARE

## 2025-07-03 ENCOUNTER — LAB VISIT (OUTPATIENT)
Dept: LAB | Facility: HOSPITAL | Age: 80
End: 2025-07-03
Attending: INTERNAL MEDICINE
Payer: MEDICARE

## 2025-07-03 VITALS
DIASTOLIC BLOOD PRESSURE: 58 MMHG | OXYGEN SATURATION: 97 % | TEMPERATURE: 98 F | HEART RATE: 88 BPM | SYSTOLIC BLOOD PRESSURE: 127 MMHG | RESPIRATION RATE: 18 BRPM

## 2025-07-03 DIAGNOSIS — N18.9 ANEMIA IN CHRONIC KIDNEY DISEASE, UNSPECIFIED CKD STAGE: ICD-10-CM

## 2025-07-03 DIAGNOSIS — N18.30 ANEMIA IN STAGE 3 CHRONIC KIDNEY DISEASE: Primary | ICD-10-CM

## 2025-07-03 DIAGNOSIS — D63.1 ANEMIA IN STAGE 3 CHRONIC KIDNEY DISEASE: Primary | ICD-10-CM

## 2025-07-03 DIAGNOSIS — D63.1 ANEMIA IN STAGE 3A CHRONIC KIDNEY DISEASE: ICD-10-CM

## 2025-07-03 DIAGNOSIS — D50.9 IRON DEFICIENCY ANEMIA, UNSPECIFIED IRON DEFICIENCY ANEMIA TYPE: ICD-10-CM

## 2025-07-03 DIAGNOSIS — D63.1 ANEMIA IN STAGE 3 CHRONIC KIDNEY DISEASE: ICD-10-CM

## 2025-07-03 DIAGNOSIS — D63.1 ANEMIA IN CHRONIC KIDNEY DISEASE, UNSPECIFIED CKD STAGE: ICD-10-CM

## 2025-07-03 DIAGNOSIS — N18.30 ANEMIA IN STAGE 3 CHRONIC KIDNEY DISEASE: ICD-10-CM

## 2025-07-03 DIAGNOSIS — N18.31 ANEMIA IN STAGE 3A CHRONIC KIDNEY DISEASE: ICD-10-CM

## 2025-07-03 DIAGNOSIS — Z53.1 PROCEDURE AND TREATMENT NOT CARRIED OUT BECAUSE OF PATIENT'S DECISION FOR REASONS OF BELIEF AND GROUP PRESSURE: ICD-10-CM

## 2025-07-03 LAB
ABSOLUTE EOSINOPHIL (OHS): 0.07 K/UL
ABSOLUTE MONOCYTE (OHS): 0.52 K/UL (ref 0.3–1)
ABSOLUTE NEUTROPHIL COUNT (OHS): 3.94 K/UL (ref 1.8–7.7)
BASOPHILS # BLD AUTO: 0.03 K/UL
BASOPHILS NFR BLD AUTO: 0.5 %
ERYTHROCYTE [DISTWIDTH] IN BLOOD BY AUTOMATED COUNT: 13.7 % (ref 11.5–14.5)
HCT VFR BLD AUTO: 31.7 % (ref 37–48.5)
HGB BLD-MCNC: 10.6 GM/DL (ref 12–16)
IMM GRANULOCYTES # BLD AUTO: 0.09 K/UL (ref 0–0.04)
IMM GRANULOCYTES NFR BLD AUTO: 1.6 % (ref 0–0.5)
LYMPHOCYTES # BLD AUTO: 1 K/UL (ref 1–4.8)
MCH RBC QN AUTO: 33.8 PG (ref 27–31)
MCHC RBC AUTO-ENTMCNC: 33.4 G/DL (ref 32–36)
MCV RBC AUTO: 101 FL (ref 82–98)
NUCLEATED RBC (/100WBC) (OHS): 0 /100 WBC
PLATELET # BLD AUTO: 299 K/UL (ref 150–450)
PMV BLD AUTO: 8.3 FL (ref 9.2–12.9)
RBC # BLD AUTO: 3.14 M/UL (ref 4–5.4)
RELATIVE EOSINOPHIL (OHS): 1.2 %
RELATIVE LYMPHOCYTE (OHS): 17.7 % (ref 18–48)
RELATIVE MONOCYTE (OHS): 9.2 % (ref 4–15)
RELATIVE NEUTROPHIL (OHS): 69.8 % (ref 38–73)
WBC # BLD AUTO: 5.65 K/UL (ref 3.9–12.7)

## 2025-07-03 PROCEDURE — 63600175 PHARM REV CODE 636 W HCPCS: Mod: JZ,EC,TB,HCNC | Performed by: INTERNAL MEDICINE

## 2025-07-03 PROCEDURE — 96372 THER/PROPH/DIAG INJ SC/IM: CPT | Mod: HCNC

## 2025-07-03 PROCEDURE — 36415 COLL VENOUS BLD VENIPUNCTURE: CPT | Mod: HCNC

## 2025-07-03 PROCEDURE — 85025 COMPLETE CBC W/AUTO DIFF WBC: CPT | Mod: HCNC

## 2025-07-03 RX ADMIN — EPOETIN ALFA-EPBX 40000 UNITS: 40000 INJECTION, SOLUTION INTRAVENOUS; SUBCUTANEOUS at 02:07

## 2025-07-08 ENCOUNTER — PATIENT MESSAGE (OUTPATIENT)
Dept: CARDIOLOGY | Facility: CLINIC | Age: 80
End: 2025-07-08
Payer: MEDICARE

## 2025-07-15 ENCOUNTER — OFFICE VISIT (OUTPATIENT)
Facility: CLINIC | Age: 80
End: 2025-07-15
Payer: MEDICARE

## 2025-07-15 VITALS
HEART RATE: 85 BPM | HEIGHT: 62 IN | WEIGHT: 149 LBS | OXYGEN SATURATION: 98 % | DIASTOLIC BLOOD PRESSURE: 65 MMHG | BODY MASS INDEX: 27.42 KG/M2 | SYSTOLIC BLOOD PRESSURE: 125 MMHG | RESPIRATION RATE: 16 BRPM | TEMPERATURE: 98 F

## 2025-07-15 DIAGNOSIS — K21.9 GASTROESOPHAGEAL REFLUX DISEASE, UNSPECIFIED WHETHER ESOPHAGITIS PRESENT: ICD-10-CM

## 2025-07-15 DIAGNOSIS — E11.69 COMBINED HYPERLIPIDEMIA ASSOCIATED WITH TYPE 2 DIABETES MELLITUS: Chronic | ICD-10-CM

## 2025-07-15 DIAGNOSIS — N18.30 STAGE 3 CHRONIC KIDNEY DISEASE, UNSPECIFIED WHETHER STAGE 3A OR 3B CKD: ICD-10-CM

## 2025-07-15 DIAGNOSIS — E03.9 HYPOTHYROIDISM, UNSPECIFIED TYPE: Chronic | ICD-10-CM

## 2025-07-15 DIAGNOSIS — F03.90 DEMENTIA, UNSPECIFIED DEMENTIA SEVERITY, UNSPECIFIED DEMENTIA TYPE, UNSPECIFIED WHETHER BEHAVIORAL, PSYCHOTIC, OR MOOD DISTURBANCE OR ANXIETY: ICD-10-CM

## 2025-07-15 DIAGNOSIS — K59.00 CONSTIPATION, UNSPECIFIED CONSTIPATION TYPE: ICD-10-CM

## 2025-07-15 DIAGNOSIS — E11.22 HYPERTENSION ASSOCIATED WITH STAGE 3A CHRONIC KIDNEY DISEASE DUE TO TYPE 2 DIABETES MELLITUS: ICD-10-CM

## 2025-07-15 DIAGNOSIS — Z79.01 LONG TERM (CURRENT) USE OF ANTICOAGULANTS: ICD-10-CM

## 2025-07-15 DIAGNOSIS — G89.29 OTHER CHRONIC PAIN: Chronic | ICD-10-CM

## 2025-07-15 DIAGNOSIS — N18.31 HYPERTENSION ASSOCIATED WITH STAGE 3A CHRONIC KIDNEY DISEASE DUE TO TYPE 2 DIABETES MELLITUS: ICD-10-CM

## 2025-07-15 DIAGNOSIS — Z86.16 HISTORY OF COVID-19: ICD-10-CM

## 2025-07-15 DIAGNOSIS — K76.0 FATTY LIVER: ICD-10-CM

## 2025-07-15 DIAGNOSIS — R06.83 SNORING: ICD-10-CM

## 2025-07-15 DIAGNOSIS — I27.20 PULMONARY HYPERTENSION, UNSPECIFIED: ICD-10-CM

## 2025-07-15 DIAGNOSIS — Z98.890 HX OF CERVICAL SPINE SURGERY: Primary | ICD-10-CM

## 2025-07-15 DIAGNOSIS — E78.2 COMBINED HYPERLIPIDEMIA ASSOCIATED WITH TYPE 2 DIABETES MELLITUS: Chronic | ICD-10-CM

## 2025-07-15 DIAGNOSIS — I12.9 HYPERTENSION ASSOCIATED WITH STAGE 3A CHRONIC KIDNEY DISEASE DUE TO TYPE 2 DIABETES MELLITUS: ICD-10-CM

## 2025-07-15 PROBLEM — E83.51 HYPOCALCEMIA: Status: RESOLVED | Noted: 2021-02-24 | Resolved: 2025-07-15

## 2025-07-15 PROBLEM — R29.90 EPISODE OF TRANSIENT NEUROLOGIC SYMPTOMS: Status: RESOLVED | Noted: 2025-03-24 | Resolved: 2025-07-15

## 2025-07-15 PROCEDURE — 99215 OFFICE O/P EST HI 40 MIN: CPT | Mod: HCNC,S$GLB,, | Performed by: HOSPITALIST

## 2025-07-15 PROCEDURE — 99999 PR PBB SHADOW E&M-EST. PATIENT-LVL V: CPT | Mod: PBBFAC,HCNC,, | Performed by: HOSPITALIST

## 2025-07-15 PROCEDURE — 1159F MED LIST DOCD IN RCRD: CPT | Mod: CPTII,HCNC,S$GLB, | Performed by: HOSPITALIST

## 2025-07-15 PROCEDURE — 3074F SYST BP LT 130 MM HG: CPT | Mod: CPTII,HCNC,S$GLB, | Performed by: HOSPITALIST

## 2025-07-15 PROCEDURE — 1157F ADVNC CARE PLAN IN RCRD: CPT | Mod: CPTII,HCNC,S$GLB, | Performed by: HOSPITALIST

## 2025-07-15 PROCEDURE — 1125F AMNT PAIN NOTED PAIN PRSNT: CPT | Mod: CPTII,HCNC,S$GLB, | Performed by: HOSPITALIST

## 2025-07-15 PROCEDURE — 3078F DIAST BP <80 MM HG: CPT | Mod: CPTII,HCNC,S$GLB, | Performed by: HOSPITALIST

## 2025-07-15 PROCEDURE — 1160F RVW MEDS BY RX/DR IN RCRD: CPT | Mod: CPTII,HCNC,S$GLB, | Performed by: HOSPITALIST

## 2025-07-15 RX ORDER — CHLORTHALIDONE 25 MG/1
1 TABLET ORAL DAILY
COMMUNITY

## 2025-07-15 RX ORDER — DEXTROMETHORPHAN HYDROBROMIDE, GUAIFENESIN 5; 100 MG/5ML; MG/5ML
1300 LIQUID ORAL 3 TIMES DAILY PRN
COMMUNITY

## 2025-07-15 NOTE — PROGRESS NOTES
[facility]-PreOp Consults  Progress Note    Patient Name: Regino Lawrence  MRN: [id number]  Date of Evaluation- 07/15/2025  PCP- Micaela Mendoza MD    Future cases for Regino Lawrence [[id number]]       Case ID Status Date Time Rub[place] Procedure Provider Location    [id number] [facility] 2025  7:00 AM 45 RELEASE, TRIGGER FINGER Chon Dunham MD [[id number]] [facility]            HPI:  History of present illness- I had the pleasure of meeting this pleasant 79 y.o. lady in the pre op clinic prior to her elective Orthopedic surgery. The patient is new to me .Ms Lacy was accompanied by daughter Ms Mcguire.    I have obtained the history by speaking to the patient and by reviewing the electronic health records.    Events leading up to surgery / History of presenting illness -  Trigger finger of all digits of left hand     She is having surgery on her left hand on  and has come in for perioperative care evaluation  She has trigger fingers left hand  She is troubled with 10/10 left hand pain for about 1 year  Physical activity increases the pain and resting helps        Relevant health conditions of significance for the perioperative period/ History of presenting illness -      sarcoidosis with associated myopathy and   arthropathy   Mosque  q2w Retacrit 40k injection  DM2  Dementia  CKD  Anticoagulation  HTN  GERD  Hypothyroidism     Lives with daughter in a single-level house  Currently not working   Pets- none  Children -1 daughter and 4 sons all of them are grown  Pregnancies - 6  Miscarriages - ?weeks into the gestation   C sections last delivery was   Has help post op    Not known to have  , heart problem , Stroke/ Mini stroke ,   fatty liver , blood vessels stent , tobacco smoking,  mental health problems , gout, allergies       Subjective/ Objective:     Chief complaint-Preoperative evaluation, Perioperative Medical management, complication reduction plan     Active cardiac conditions- none    Revised  "cardiac risk index predictors- insulin requiring diabetes mellitus    Functional capacity -Examples of physical activity  reduced-walks in the house with a cane----- She can undertake all the above activities without  chest pain,chest tightness, Shortness of breath ,dizziness,lightheadedness making her exercise tolerance  less  than 4 Mets.       Review of Systems   Constitutional:  Negative for chills and fever.        No unusual weight changes     HENT:          STOPBANG score  / 8    Loud Snoring     Elevated BP  Age over 50      Eyes:         No new visual changes   Respiratory:          No cough , phlegm    No Hemoptysis   Cardiovascular:         As noted   Gastrointestinal:         No overt GI/ blood losses  Bowel movements- Regular   Endocrine:        Prednisone use > 20 mg daily for 3 weeks- none   Genitourinary:  Negative for dysuria.        No urinary hesitancy    Musculoskeletal:         As above       Skin:  Negative for rash.   Neurological:  Negative for syncope.        No unilateral weakness   Hematological:         Current use of Anticoagulants  Yes  She usually holds blood thinner for surgery without bridging   Psychiatric/Behavioral:          No Depression,Anxiety         Suggest perioperative cardiac monitoring    Lost her  last year    No anesthesia, bleeding, cardiac problems , PONVwith previous surgeries/procedures.  Medications and Allergies reviewed in epic.   FH- No anesthesia,bleeding / venous thrombosis ,  in family      Physical Exam  Blood pressure 125/65, pulse 85, temperature 98.3 °F (36.8 °C), temperature source Oral, resp. rate 16, height 5' 2" (1.575 m), weight 67.6 kg (149 lb), SpO2 98%.  I offered a sheet  and the presence of a chaperone during physical examination   She was comfortable to proceed with the exam without the the presence of a chaperone        Physical Exam  Constitutional- Vitals - Body mass index is 27.25 kg/m².,   Vitals:    07/15/25 1124   BP: 125/65 "   Pulse: 85   Resp: 16   Temp: 98.3 °F (36.8 °C)     General appearance-Conscious,Coherent  Eyes- No conjunctival icterus,pupils  round  and  bilateral intra ocular lenses  ENT-Oral cavity- moist ,  upper denture, and lower denture    , Hearing grossly normal   Neck- No thyromegaly ,Trachea -central, No jugular venous distension,   No Carotid Bruit   Cardiovascular -Heart Sounds- Normal  and  no murmur   , No gallop rhythm   Respiratory - Normal Respiratory Effort, Normal breath sounds,  no wheeze , and  no forced expiratory wheeze    Peripheral pitting pedal edema-- mild, no calf pain   Gastrointestinal -Soft abdomen, No palpable masses, Non Tender,Liver,Spleen not palpable. No-- free fluid and shifting dullness  Musculoskeletal- No finger Clubbing. Strength grossly normal   Lymphatic-No Palpable cervical, axillary,Inguinal lymphadenopathy   Psychiatric - normal effect,Orientation  Rt Dorsalis pedis pulses-palpable    Lt Dorsalis pedis pulses- palpable   Rt Posterior tibial pulses -palpable   Left posterior tibial pulses -palpable   Miscellaneous -  no asterixis,  Surgical scar neck , and  no renal bruit   Examineon electric scooter  Investigations  Lab and Imaging have been reviewed in epic.      Review of old records- Was done and information gathered regards to events leading to surgery and health conditions of significance in the perioperative period.        Preoperative cardiac risk assessment-  The patient does not have any active cardiac conditions . Revised cardiac risk index predictors- 1---.Functional capacity is less than 4 Mets. She will be undergoing a Orthopedic procedure that carries a Moderate Risk risk     Risk of a major Cardiac event ( Defined as death, myocardial infarction, or cardiac arrest at 30 days after noncardiac surgery), based on RCRI score     -6.0%       No further cardiac work up is indicated prior to proceeding with the surgery          American Society of Anesthesiologists Physical  status classification ( ASA ) class: 3     Postoperative pulmonary complication risk assessment:      ARISCAT ( Canet) risk index- risk class -  Low, if duration of surgery is under 3 hours, intermediate, if duration of surgery is over 3 hours          Assessment/Plan:     Hx of cervical spine surgery  2015   Fell and fractured cervical vertebra leading with the surgery  She has some subjective neck movement limitation  She is going to have regional anesthesia    Chronic pain  She has longstanding pain in her joints for which she takes Tylenol  Suggested care with Tylenol use  As per chart-sarcoidosis with associated myopathy and   arthropathy prednisone 4 mg    She is on prednisone 4 mg once a day  She has been on longstanding prednisone since 1981 at a higher dose    Steroid treatment- I suggest monitoring the patient for any suggestions of adrenal insufficiency in view of the  steroid use       She is doing good from a sarcoid standpoint  No rheumatoid arthritis    He is on Cymbalta    I suggest monitoring the sodium as SIADH from Cymbalta  use and hypersecretion of ADH associated with surgery can reduce sodium in the perioperative period  Discussed risk of bleeding with Cymbalta    Dementia, unspecified dementia severity, unspecified dementia type, unspecified whether behavioral, psychotic, or mood disturbance or anxiety  She has dementia and is on Namenda  Suggest the following measures to reduce the risk of postoperative delirium     Early ambulation, as able to  Early hospital discharge, as able to  Having the patient in a room with a window, clock, calendar  Having family members/familiar faces around the patient so that they can help with orientation with place time and person  Addressing hydration   Addressing constipation   Letting the patient have adequate sleep  Watching benzodiazepine use   Watching opioid use     Nonintractable epilepsy without status epilepticus  She had episodes of passingout   She  is taking Keppra  Since been on Keppra she is not passing  Suggested care with tramadol use as it can lower the seizure threshold    Because of arthritis, she is unable to drive    Nuclear sclerosis  She wears glasses but other than that no eye problems    Combined 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  Fenofibrate  Suggested not taking fenofibrate the day before surgery and on the morning of surgery    She is on Lantus for diabetes      Hemoglobin A1c- 5        Capillary glucose check-pre meal about 120      Diabetes Complications     Microvascular     Not known to have   Diabetes affecting the eyes   Tngling numbness of hands and feet  No reported open areas on the feet   Feet care suggested     Macrovascular     No stroke/ TIA  Not known to have CAD        Diabetes Mellitus-I suggest monitoring the glucose in the perioperative period ( Before meals and bed time,if the patient is on oral feeds or every 6 hourly ,if the patient is NPO )  Blood glucose target in hospitalized patients is 140-180. Oral Hypoglycemic agents are generally avoided during the hospital stay . If glucose is consistently elevated ,I suggest using basal ,prandial Insulin regimen to control the glucose , as elevated glucose can be associated with adverse surgical out comes. Please consider involving Hospital Medicine or Endocrinology ,if any help is needed with Glucose control. Patient will be instructed based on the pre op clinic guidelines  about adjustment of diabetic treatment (If applicable )  considering the NPO status for Surgery      I had educated that uncontrolled DM can cause post op complications,risk of infection, wound healing problem,increased length of stay in hospital and its associated complications.I suggest exercise as much as possible and follow diabetic diet     She gets occasional short of breath in the wintertime  She gets short of breath on and off if her hemoglobin is lower    2024       Normal  myocardial perfusion scan. There is no evidence of myocardial ischemia or infarction.    The gated perfusion images showed an ejection fraction of 71% at rest.    The ECG portion of the study is negative for ischemia.    The patient reported no chest pain during the stress test.    There were no arrhythmias during stress.    2024      Left Ventricle: The left ventricle is normal in size. Mildly increased wall thickness. There is mild concentric hypertrophy. There is normal systolic function with a visually estimated ejection fraction of 55 - 60%. Grade I diastolic dysfunction.    Right Ventricle: Normal right ventricular cavity size. Systolic function is normal.    Tricuspid Valve: There is mild regurgitation.    Pulmonary Artery: The estimated pulmonary artery systolic pressure is 53 mmHg    She gets erythropoietin every 2 weeks    A few days ago she had chest pain and shortness a breath  Resting helps  Radiation- none   No associated sweating short of breath nausea vomiting diarrhea  This can happen at rest  Nonpleuritic in nature  He does not feel that this is reflux related    She was evaluated by the cardiologist's and the lung doctor for shortness a breath and to her understanding he does not have lung or heart problems    I offered cardiac evaluation but she wants to move on with the surgery            Pulmonary hypertension, unspecified  Sept 2024      Left Ventricle: The left ventricle is normal in size. Mildly increased wall thickness. There is mild concentric hypertrophy. There is normal systolic function with a visually estimated ejection fraction of 55 - 60%. Grade I diastolic dysfunction.    Right Ventricle: Normal right ventricular cavity size. Systolic function is normal.    Tricuspid Valve: There is mild regurgitation.    Pulmonary Artery: The estimated pulmonary artery systolic pressure is 53 mmHg.    Pulmonary arterial hypertension       WHO functional classification     Class 2/3 -  fatigue  Clinical classification - based on etiology       Group 2      [place] heart association functional class     Class 2/3      Planned Anesthesia-  Regional    Rt heart dysfunction -none    Suggest avoidance of Intra vascular volume over load or reduced pre load     I suggest that the anaesthesiologist be aware of the Pulmonary artery hypertension , so that sedation,  anesthetic plans can be made with consideration of Pulmonary Hypertension  .     CKD (chronic kidney disease) stage 3, GFR 30-59 ml/min  Creatinine stable  Stages of CKD discussed  Deleterious effects NSAID's , Beneficial effects of Hydration discussed   Acetaminophen ( If not intolerant ) as needed for pain     I  suggest monitoring renal function, in put and out put status josiah-operatively. I  suggest avoiding nephrotoxic medication including NSAIDs, COX2 inhibitors, intravenous contrast agent,avoiding hypotension to prevent further renal impairment.   Care with intravenous contrast discussed     Long term (current) use of anticoagulants  Anticoagulation Eliquis twice a day  As per the chart, history of pulmonary embolism    July 2021    Thromboemboli within the right middle lobe, right lower lobe and left lower lobe segmental and subsegmental arteries. No evidence of right heart strain.     July 2021    No evidence of deep venous thrombosis in either lower extremity     1Episode  Associated with reduced mobility around the time of the thrombosis, no long journeys, no cancer around the time of thrombosis,no  prior surgery around the time thrombosis, no Hospital stay around the time of thrombosis, no HRT  Anticoagulated   IVC filter  -none    She had pulmonary embolism in 2021 likely provoked from reduced mobility      Thrombo-embolic event within the last 3 months- none     - Restoration of normal sinus rhythm from atrial fibrillation or atrial flutter, either by cardioversion or ablation, within the last month- none     - Pulmonary vein  isolation within the last 2 months- none     - Left atrial appendage thrombus within the last 3 months- none .    No contraindications to holding anticoagulation for surgery     Date of surgery - 7/23  Type of Anesthesia- Regional   Last day of  use pre op- he usually holds Eliquis 3 days before surgery that she preferred doing this time also-last day of use before surgery is 19th of July 2022    No evidence of acute pulmonary embolism.        Anemia in CKD (chronic kidney disease)  No overt blood losses  To her understanding, she was evaluated for this and no cause was found    Hypertension associated with stage 3a chronic kidney disease due to type 2 diabetes mellitus  She is currently taking carvedilol and nifedipine      Hypertension-  Blood pressure is acceptable . I suggest continuation of ----carvedilol, nifedipine---- during the entire perioperative period. I suggest holding --chlorthalidone------ on the morning of the surgery and can continue that  post operatively under blood pressure, electrolyte and renal function monitoring as long as they are acceptable.I suggest addressing pain control as uncontrolled pain can increased blood pressure   Sodium is low likely from chlorthalidone     Hypothyroidism  Suggested spacing Thyroxine replacement with food, other Medication    Hypothyroidism- I suggest continuation of synthroid replacement in the perioperative period    No overt hypo or hyperthyroid symptoms     GERD (gastroesophageal reflux disease)  GERD Protonix        GERD-  I suggest continuation of the Proton pump inhibitor in the perioperative period . I suggest aspiration precautions    Constipation  Suggested working on bowel movements in preparation for surgery   Constipation- I suggest giving bowel movement regimen as opioid use,reduced ambulation  can increase the constipation      Snoring    Possible sleep apnea- I suggest a sleep study and suggest caution with usage of medication that can cause  respiratory suppression in the perioperative period  potential ramifications of untreated sleep apnea, which could include daytime sleepiness, hypertension, heart disease and stroke were discussed  Suggested not to drive, if feels sleepy    Avoidance of  supine sleep, weight gain , alcoholic beverages , care with , sedative , CNS depressant use indicated  since all of these can worsen DEANN      History of COVID-19  Did not require hospitalization, intubation or supplemental oxygen use   Had been vaccinated   Recovered from COVID    COVID screening     No fever   No cough   No SOB  No sore throat   No loss of taste or smell   No muscle aches   No nausea, vomiting , diarrhea     BMI 27.0-27.9,adult  Weight related conditions     Known to have     HTN  Type 2 Diabetes   Hyperlipidemia   Sleep apnea ?  Acid reflux   Osteoarthritis    Not troubled with / Not known to have        Gout     Fatty liver        Encouraged maintaining healthy weight for improved health     Fatty liver  Non alcohol-related fatty liver disease   No cirrhosis of liver jaundice, dark urine pale stool        Preventive perioperative care    Thromboembolic prophylaxis:  Her risk factors for thrombosis include surgical procedure, previous history of thrombosis, and age.I suggest  thromboembolic prophylaxis ( mechanical/pharmacological, weighing the risk benefits of pharmacological agent use considering josiah procedural bleeding )  during the perioperative period.I suggested being active in the post operative period.      Postoperative pulmonary complication prophylaxis-Risk factors for post operative pulmonary complications include age over 65 years and ASA class >2- I suggest incentive spirometry use, early ambulation, and end tidal carbon dioxide monitoring  , oral care , head end of bed elevation      Renal complication prophylaxis-Risk factors for renal complications include pre-existing renal disease, diabetes mellitus, and hypertension . I  suggest keeping her optimally  hydrated and avoidance/ minimizing the use of  NSAID's,CAMACHO 2 Inhibitors ,IV contrast if possible in the perioperative period.  y      Surgical site Infection Prophylaxis-I  suggest appropriate antibiotic for Prophylaxis against Surgical site infections  No reported Staph infection  Skin antibacterial discussed       Delirium prophylaxis-Risk factors - Advanced Age - I suggest avoidance / minimizing the use of  Benzodiazepines ( unless the patient has been taking it on a regular basis ),Anticholinergic medication,Antihistamines ( like  Benadryl).I suggest minimizing the use of opioid medication and use of IV tylenol,if it is appropriate. I suggest using the lowest possible dose of opioids for the shortest duration possible in the perioperative period. I suggest to Keep shades/blinds open during the day, lights off and shades closed at night to encourage normal sleep/wake cycle.I encourage the presence of the family member with the patient at all times, if at all possible as mental status changes can be picked up early by the family members and they help with reorientation. I encouraged the presence of family to help with orientation in the perioperative period. Benadryl avoidance suggested        This visit was focused on Preoperative evaluation, Perioperative Medical management, complication reduction plans. I suggest that the patient follows up with primary care or relevant sub specialists for ongoing health care.    I appreciate the opportunity to be involved in this patients care. Please feel free to contact me if there were any questions about this consultation.    Patient is optimized    Patient/ care giver/ Family member was instructed to call and update me about any changes to health,  medication, office visits ,testing out side of the [facility] operative care center , hospitalizations between now and surgery      Saundra Kaur MD  Internal Medicine  [facility]    Cell Phone- [phone]    Checked for over-the-counter medication      In summary this pleasant 79 year lady is heading for left hand surgery on July 23rd    She has multiple medical problems and she is doing fairly well    She is on chronic prednisone 4 mg a day for sarcoidosis and seems to be doing well    She has longstanding musculoskeletal pains    She has shortness a breath that she correlates with low hemoglobin and had tests done for her heart and lungs and to her understanding does not have any heart and lung problems  She wants to move forward with the surgery without any further evaluation    She may have untreated sleep apnea that could be contributing to pulmonary hypertension  I suggest the anesthesiology team be aware of her pulmonary pressure  Noted that she is having a regional anesthesia    She is a Jew    I have spent --80---- minutes of time which includes, time spent to prepare to see the patient , obtaining history ,performing examination, counseling/Educating the patient , Documenting clinical information in the record      Message sent to the surgeon, anesthesiologist, rheumatologist, primary care provider  ----    7/15- 16 19     Corresponded with the anesthesiologist  Given the elevated PA pressure from the past, plan is to check a 2D echocardiogram  --    7/17- 13 28     Echo showed    Left Ventricle: The left ventricle is normal in size. Normal wall thickness. There is concentric remodeling. There is normal systolic function with a visually estimated ejection fraction of 60 - 65%. Grade I diastolic dysfunction.    Right Ventricle: The right ventricle is normal in size measuring 3.3 cm. Systolic function is normal.    Tricuspid Valve: There is mild to moderate regurgitation.    Pulmonary Artery: The estimated pulmonary artery systolic pressure is 39 mmHg.     Called to follow up , to address any concerns with the up coming surgery or any questions on Medication  instructions   Unable to speak   Left a message to call, if needed , if has any concerns with the up coming surgery or any questions on Medication instructions , if had changes to medication or health , since my evaluation

## 2025-07-15 NOTE — HPI
History of present illness- I had the pleasure of meeting this pleasant 79 y.o. lady in the pre op clinic prior to her elective Orthopedic surgery. The patient is new to me .Ms Lacy was accompanied by daughter Ms Mcguire.    I have obtained the history by speaking to the patient and by reviewing the electronic health records.    Events leading up to surgery / History of presenting illness -  Trigger finger of all digits of left hand     She is having surgery on her left hand on  and has come in for perioperative care evaluation  She has trigger fingers left hand  She is troubled with 10/10 left hand pain for about 1 year  Physical activity increases the pain and resting helps        Relevant health conditions of significance for the perioperative period/ History of presenting illness -      sarcoidosis with associated myopathy and   arthropathy   Religious  q2w Retacrit 40k injection  DM2  Dementia  CKD  Anticoagulation  HTN  GERD  Hypothyroidism     Lives with daughter in a single-level house  Currently not working   Pets- none  Children -1 daughter and 4 sons all of them are grown  Pregnancies - 6  Miscarriages - ?weeks into the gestation   C sections last delivery was   Has help post op    Not known to have  , heart problem , Stroke/ Mini stroke ,   fatty liver , blood vessels stent , tobacco smoking,  mental health problems , gout, allergies

## 2025-07-15 NOTE — ASSESSMENT & PLAN NOTE
She had episodes of passingout   She is taking Keppra  Since been on Keppra she is not passing  Suggested care with tramadol use as it can lower the seizure threshold    Because of arthritis, she is unable to drive

## 2025-07-15 NOTE — ASSESSMENT & PLAN NOTE
Possible sleep apnea- I suggest a sleep study and suggest caution with usage of medication that can cause respiratory suppression in the perioperative period  potential ramifications of untreated sleep apnea, which could include daytime sleepiness, hypertension, heart disease and stroke were discussed  Suggested not to drive, if feels sleepy    Avoidance of  supine sleep, weight gain , alcoholic beverages , care with , sedative , CNS depressant use indicated  since all of these can worsen DEANN

## 2025-07-15 NOTE — ASSESSMENT & PLAN NOTE
Weight related conditions     Known to have     HTN  Type 2 Diabetes   Hyperlipidemia   Sleep apnea ?  Acid reflux   Osteoarthritis    Not troubled with / Not known to have        Gout     Fatty liver        Encouraged maintaining healthy weight for improved health

## 2025-07-15 NOTE — ASSESSMENT & PLAN NOTE
Anticoagulation Eliquis twice a day  As per the chart, history of pulmonary embolism    July 2021    Thromboemboli within the right middle lobe, right lower lobe and left lower lobe segmental and subsegmental arteries. No evidence of right heart strain.     July 2021    No evidence of deep venous thrombosis in either lower extremity     1Episode  Associated with reduced mobility around the time of the thrombosis, no long journeys, no cancer around the time of thrombosis,no  prior surgery around the time thrombosis, no Hospital stay around the time of thrombosis, no HRT  Anticoagulated   IVC filter  -none    She had pulmonary embolism in 2021 likely provoked from reduced mobility      Thrombo-embolic event within the last 3 months- none     - Restoration of normal sinus rhythm from atrial fibrillation or atrial flutter, either by cardioversion or ablation, within the last month- none     - Pulmonary vein isolation within the last 2 months- none     - Left atrial appendage thrombus within the last 3 months- none .    No contraindications to holding anticoagulation for surgery     Date of surgery - 7/23  Type of Anesthesia- Regional   Last day of  use pre op- he usually holds Eliquis 3 days before surgery that she preferred doing this time also-last day of use before surgery is 19th of July 2022    No evidence of acute pulmonary embolism.

## 2025-07-15 NOTE — ASSESSMENT & PLAN NOTE
She has dementia and is on Namenda  Suggest the following measures to reduce the risk of postoperative delirium     Early ambulation, as able to  Early hospital discharge, as able to  Having the patient in a room with a window, clock, calendar  Having family members/familiar faces around the patient so that they can help with orientation with place time and person  Addressing hydration   Addressing constipation   Letting the patient have adequate sleep  Watching benzodiazepine use   Watching opioid use

## 2025-07-15 NOTE — ASSESSMENT & PLAN NOTE
Suggested spacing Thyroxine replacement with food, other Medication    Hypothyroidism- I suggest continuation of synthroid replacement in the perioperative period    No overt hypo or hyperthyroid symptoms

## 2025-07-15 NOTE — ASSESSMENT & PLAN NOTE
Creatinine stable  Stages of CKD discussed  Deleterious effects NSAID's , Beneficial effects of Hydration discussed   Acetaminophen ( If not intolerant ) as needed for pain     I  suggest monitoring renal function, in put and out put status josiah-operatively. I  suggest avoiding nephrotoxic medication including NSAIDs, COX2 inhibitors, intravenous contrast agent,avoiding hypotension to prevent further renal impairment.   Care with intravenous contrast discussed

## 2025-07-15 NOTE — ASSESSMENT & PLAN NOTE
She is currently taking carvedilol and nifedipine      Hypertension-  Blood pressure is acceptable . I suggest continuation of ----carvedilol, nifedipine---- during the entire perioperative period. I suggest holding --chlorthalidone------ on the morning of the surgery and can continue that  post operatively under blood pressure, electrolyte and renal function monitoring as long as they are acceptable.I suggest addressing pain control as uncontrolled pain can increased blood pressure   Sodium is low likely from chlorthalidone

## 2025-07-15 NOTE — ASSESSMENT & PLAN NOTE
2015   Fell and fractured cervical vertebra leading with the surgery  She has some subjective neck movement limitation  She is going to have regional anesthesia

## 2025-07-15 NOTE — OUTPATIENT SUBJECTIVE & OBJECTIVE
"Outpatient Subjective & Objective     Chief complaint-Preoperative evaluation, Perioperative Medical management, complication reduction plan     Active cardiac conditions- none    Revised cardiac risk index predictors- insulin requiring diabetes mellitus    Functional capacity -Examples of physical activity  reduced-walks in the house with a cane----- She can undertake all the above activities without  chest pain,chest tightness, Shortness of breath ,dizziness,lightheadedness making her exercise tolerance  less  than 4 Mets.       Review of Systems   Constitutional:  Negative for chills and fever.        No unusual weight changes     HENT:          STOPBANG score  / 8    Loud Snoring     Elevated BP  Age over 50      Eyes:         No new visual changes   Respiratory:          No cough , phlegm    No Hemoptysis   Cardiovascular:         As noted   Gastrointestinal:         No overt GI/ blood losses  Bowel movements- Regular   Endocrine:        Prednisone use > 20 mg daily for 3 weeks- none   Genitourinary:  Negative for dysuria.        No urinary hesitancy    Musculoskeletal:         As above       Skin:  Negative for rash.   Neurological:  Negative for syncope.        No unilateral weakness   Hematological:         Current use of Anticoagulants  Yes  She usually holds blood thinner for surgery without bridging   Psychiatric/Behavioral:          No Depression,Anxiety         Suggest perioperative cardiac monitoring    Lost her  last year    No anesthesia, bleeding, cardiac problems , PONVwith previous surgeries/procedures.  Medications and Allergies reviewed in epic.   FH- No anesthesia,bleeding / venous thrombosis ,  in family      Physical Exam  Blood pressure 125/65, pulse 85, temperature 98.3 °F (36.8 °C), temperature source Oral, resp. rate 16, height 5' 2" (1.575 m), weight 67.6 kg (149 lb), SpO2 98%.  I offered a sheet  and the presence of a chaperone during physical examination   She was comfortable " to proceed with the exam without the the presence of a chaperone        Physical Exam  Constitutional- Vitals - Body mass index is 27.25 kg/m².,   Vitals:    07/15/25 1124   BP: 125/65   Pulse: 85   Resp: 16   Temp: 98.3 °F (36.8 °C)     General appearance-Conscious,Coherent  Eyes- No conjunctival icterus,pupils  round  and  bilateral intra ocular lenses  ENT-Oral cavity- moist ,  upper denture, and lower denture    , Hearing grossly normal   Neck- No thyromegaly ,Trachea -central, No jugular venous distension,   No Carotid Bruit   Cardiovascular -Heart Sounds- Normal  and  no murmur   , No gallop rhythm   Respiratory - Normal Respiratory Effort, Normal breath sounds,  no wheeze , and  no forced expiratory wheeze    Peripheral pitting pedal edema-- mild, no calf pain   Gastrointestinal -Soft abdomen, No palpable masses, Non Tender,Liver,Spleen not palpable. No-- free fluid and shifting dullness  Musculoskeletal- No finger Clubbing. Strength grossly normal   Lymphatic-No Palpable cervical, axillary,Inguinal lymphadenopathy   Psychiatric - normal effect,Orientation  Rt Dorsalis pedis pulses-palpable    Lt Dorsalis pedis pulses- palpable   Rt Posterior tibial pulses -palpable   Left posterior tibial pulses -palpable   Miscellaneous -  no asterixis,  Surgical scar neck , and  no renal bruit   Examineon electric scooter  Investigations  Lab and Imaging have been reviewed in epic.      Review of old records- Was done and information gathered regards to events leading to surgery and health conditions of significance in the perioperative period.    Outpatient Subjective & Objective

## 2025-07-15 NOTE — ASSESSMENT & PLAN NOTE
Fenofibrate  Suggested not taking fenofibrate the day before surgery and on the morning of surgery    She is on Lantus for diabetes      Hemoglobin A1c- 5        Capillary glucose check-pre meal about 120      Diabetes Complications     Microvascular     Not known to have   Diabetes affecting the eyes   Tngling numbness of hands and feet  No reported open areas on the feet   Feet care suggested     Macrovascular     No stroke/ TIA  Not known to have CAD        Diabetes Mellitus-I suggest monitoring the glucose in the perioperative period ( Before meals and bed time,if the patient is on oral feeds or every 6 hourly ,if the patient is NPO )  Blood glucose target in hospitalized patients is 140-180. Oral Hypoglycemic agents are generally avoided during the hospital stay . If glucose is consistently elevated ,I suggest using basal ,prandial Insulin regimen to control the glucose , as elevated glucose can be associated with adverse surgical out comes. Please consider involving Hospital Medicine or Endocrinology ,if any help is needed with Glucose control. Patient will be instructed based on the pre op clinic guidelines  about adjustment of diabetic treatment (If applicable )  considering the NPO status for Surgery      I had educated that uncontrolled DM can cause post op complications,risk of infection, wound healing problem,increased length of stay in hospital and its associated complications.I suggest exercise as much as possible and follow diabetic diet     She gets occasional short of breath in the wintertime  She gets short of breath on and off if her hemoglobin is lower    2024       Normal myocardial perfusion scan. There is no evidence of myocardial ischemia or infarction.    The gated perfusion images showed an ejection fraction of 71% at rest.    The ECG portion of the study is negative for ischemia.    The patient reported no chest pain during the stress test.    There were no arrhythmias during  stress.    2024      Left Ventricle: The left ventricle is normal in size. Mildly increased wall thickness. There is mild concentric hypertrophy. There is normal systolic function with a visually estimated ejection fraction of 55 - 60%. Grade I diastolic dysfunction.    Right Ventricle: Normal right ventricular cavity size. Systolic function is normal.    Tricuspid Valve: There is mild regurgitation.    Pulmonary Artery: The estimated pulmonary artery systolic pressure is 53 mmHg    She gets erythropoietin every 2 weeks    A few days ago she had chest pain and shortness a breath  Resting helps  Radiation- none   No associated sweating short of breath nausea vomiting diarrhea  This can happen at rest  Nonpleuritic in nature  He does not feel that this is reflux related    She was evaluated by the cardiologist's and the lung doctor for shortness a breath and to her understanding he does not have lung or heart problems    I offered cardiac evaluation but she wants to move on with the surgery

## 2025-07-15 NOTE — ASSESSMENT & PLAN NOTE
GERD Protonix        GERD-  I suggest continuation of the Proton pump inhibitor in the perioperative period . I suggest aspiration precautions

## 2025-07-15 NOTE — ASSESSMENT & PLAN NOTE
She has longstanding pain in her joints for which she takes Tylenol  Suggested care with Tylenol use  As per chart-sarcoidosis with associated myopathy and   arthropathy prednisone 4 mg    She is on prednisone 4 mg once a day  She has been on longstanding prednisone since 1981 at a higher dose    Steroid treatment- I suggest monitoring the patient for any suggestions of adrenal insufficiency in view of the  steroid use       She is doing good from a sarcoid standpoint  No rheumatoid arthritis    He is on Cymbalta    I suggest monitoring the sodium as SIADH from Cymbalta  use and hypersecretion of ADH associated with surgery can reduce sodium in the perioperative period  Discussed risk of bleeding with Cymbalta

## 2025-07-15 NOTE — ASSESSMENT & PLAN NOTE
Sept 2024      Left Ventricle: The left ventricle is normal in size. Mildly increased wall thickness. There is mild concentric hypertrophy. There is normal systolic function with a visually estimated ejection fraction of 55 - 60%. Grade I diastolic dysfunction.    Right Ventricle: Normal right ventricular cavity size. Systolic function is normal.    Tricuspid Valve: There is mild regurgitation.    Pulmonary Artery: The estimated pulmonary artery systolic pressure is 53 mmHg.    Pulmonary arterial hypertension       WHO functional classification     Class 2/3 - fatigue  Clinical classification - based on etiology       Group 2      [place] heart association functional class     Class 2/3      Planned Anesthesia-  Regional    Rt heart dysfunction -none    Suggest avoidance of Intra vascular volume over load or reduced pre load     I suggest that the anaesthesiologist be aware of the Pulmonary artery hypertension , so that sedation,  anesthetic plans can be made with consideration of Pulmonary Hypertension  .

## 2025-07-16 ENCOUNTER — HOSPITAL ENCOUNTER (OUTPATIENT)
Dept: CARDIOLOGY | Facility: HOSPITAL | Age: 80
Discharge: HOME | End: 2025-07-16
Attending: NURSE PRACTITIONER
Payer: MEDICARE

## 2025-07-16 DIAGNOSIS — I27.20 PULMONARY HYPERTENSION: ICD-10-CM

## 2025-07-16 DIAGNOSIS — R06.09 DOE (DYSPNEA ON EXERTION): ICD-10-CM

## 2025-07-16 LAB
APICAL FOUR CHAMBER EJECTION FRACTION: 53 %
APICAL TWO CHAMBER EJECTION FRACTION: 59 %
ASCENDING AORTA: 2.8 CM
AV INDEX (PROSTH): 0.82
AV MEAN GRADIENT: 6 MMHG
AV PEAK GRADIENT: 10 MMHG
AV VALVE AREA BY VELOCITY RATIO: 1.9 CM²
AV VALVE AREA: 2.3 CM²
AV VELOCITY RATIO: 0.69
CV ECHO LV RWT: 0.49 CM
DOP CALC AO PEAK VEL: 1.6 M/S
DOP CALC AO VTI: 32.1 CM
DOP CALC LVOT AREA: 2.8 CM2
DOP CALC LVOT DIAMETER: 1.9 CM
DOP CALC LVOT PEAK VEL: 1.1 M/S
DOP CALC LVOT STROKE VOLUME: 74.5 CM3
DOP CALC MV VTI: 23.4 CM
DOP CALCLVOT PEAK VEL VTI: 26.3 CM
E WAVE DECELERATION TIME: 111 MSEC
E/A RATIO: 0.75
E/E' RATIO: 11 M/S
ECHO LV POSTERIOR WALL: 1 CM (ref 0.6–1.1)
FRACTIONAL SHORTENING: 36.6 % (ref 28–44)
INTERVENTRICULAR SEPTUM: 1 CM (ref 0.6–1.1)
IVC DIAMETER: 1.37 CM
IVRT: 73 MSEC
LA MAJOR: 5 CM
LA MINOR: 5.2 CM
LA WIDTH: 3.7 CM
LEFT ATRIUM SIZE: 3.3 CM
LEFT ATRIUM VOLUME: 53 CM3
LEFT INTERNAL DIMENSION IN SYSTOLE: 2.6 CM (ref 2.1–4)
LEFT VENTRICLE DIASTOLIC VOLUME: 76 ML
LEFT VENTRICLE END DIASTOLIC VOLUME APICAL 2 CHAMBER: 48.44 ML
LEFT VENTRICLE END DIASTOLIC VOLUME APICAL 4 CHAMBER: 59.67 ML
LEFT VENTRICLE SYSTOLIC VOLUME: 24 ML
LEFT VENTRICULAR INTERNAL DIMENSION IN DIASTOLE: 4.1 CM (ref 3.5–6)
LEFT VENTRICULAR MASS: 132.1 G
LV LATERAL E/E' RATIO: 10.7 M/S
LV SEPTAL E/E' RATIO: 10.7 M/S
LVED V (TEICH): 76.02 ML
LVES V (TEICH): 24.29 ML
LVOT MG: 3.08 MMHG
LVOT MV: 0.85 CM/S
MV MEAN GRADIENT: 5 MMHG
MV PEAK A VEL: 1.28 M/S
MV PEAK E VEL: 0.96 M/S
MV PEAK GRADIENT: 8 MMHG
MV STENOSIS PRESSURE HALF TIME: 32.12 MS
MV VALVE AREA BY CONTINUITY EQUATION: 3.19 CM2
MV VALVE AREA P 1/2 METHOD: 6.85 CM2
OHS CV RV/LV RATIO: 0.8 CM
OHS LV EJECTION FRACTION SIMPSONS BIPLANE MOD: 55 %
PISA TR MAX VEL: 3 M/S
PULM VEIN S/D RATIO: 2.03
PV PEAK D VEL: 0.33 M/S
PV PEAK GRADIENT: 4 MMHG
PV PEAK S VEL: 0.67 M/S
PV PEAK VELOCITY: 0.99 M/S
RA MAJOR: 4.66 CM
RA PRESSURE ESTIMATED: 3 MMHG
RA WIDTH: 3.1 CM
RIGHT VENTRICLE DIASTOLIC BASEL DIMENSION: 3.3 CM
RIGHT VENTRICULAR END-DIASTOLIC DIMENSION: 3.28 CM
RV TB RVSP: 6 MMHG
RV TISSUE DOPPLER FREE WALL SYSTOLIC VELOCITY 1 (APICAL 4 CHAMBER VIEW): 13.14 CM/S
SINUS: 2.8 CM
STJ: 2 CM
TDI LATERAL: 0.09 M/S
TDI SEPTAL: 0.09 M/S
TDI: 0.09 M/S
TR MAX PG: 37 MMHG
TRICUSPID ANNULAR PLANE SYSTOLIC EXCURSION: 1.8 CM
TV REST PULMONARY ARTERY PRESSURE: 39 MMHG

## 2025-07-16 PROCEDURE — 93306 TTE W/DOPPLER COMPLETE: CPT | Mod: 26,HCNC,, | Performed by: INTERNAL MEDICINE

## 2025-07-16 PROCEDURE — 93306 TTE W/DOPPLER COMPLETE: CPT | Mod: HCNC

## 2025-07-17 NOTE — PLAN OF CARE
Per results of Echo 7/16/25, pt's PAP was 39, which is an appropriate PAP result for pt having surgery at [facility].  Estrella with Dr. Dunham's office notified.    Estrellita De Souza RN BSN

## 2025-07-18 ENCOUNTER — INFUSION (OUTPATIENT)
Dept: INFUSION THERAPY | Facility: HOSPITAL | Age: 80
End: 2025-07-18
Attending: INTERNAL MEDICINE
Payer: MEDICARE

## 2025-07-18 ENCOUNTER — PATIENT MESSAGE (OUTPATIENT)
Dept: PREADMISSION TESTING | Facility: HOSPITAL | Age: 80
End: 2025-07-18
Payer: MEDICARE

## 2025-07-18 VITALS
SYSTOLIC BLOOD PRESSURE: 148 MMHG | OXYGEN SATURATION: 98 % | HEART RATE: 93 BPM | DIASTOLIC BLOOD PRESSURE: 67 MMHG | TEMPERATURE: 98 F | RESPIRATION RATE: 16 BRPM

## 2025-07-18 DIAGNOSIS — D63.1 ANEMIA IN STAGE 3 CHRONIC KIDNEY DISEASE: Primary | ICD-10-CM

## 2025-07-18 DIAGNOSIS — Z53.1 PROCEDURE AND TREATMENT NOT CARRIED OUT BECAUSE OF PATIENT'S DECISION FOR REASONS OF BELIEF AND GROUP PRESSURE: ICD-10-CM

## 2025-07-18 DIAGNOSIS — N18.30 ANEMIA IN STAGE 3 CHRONIC KIDNEY DISEASE: Primary | ICD-10-CM

## 2025-07-18 DIAGNOSIS — N18.9 ANEMIA IN CHRONIC KIDNEY DISEASE, UNSPECIFIED CKD STAGE: ICD-10-CM

## 2025-07-18 DIAGNOSIS — D63.1 ANEMIA IN STAGE 3A CHRONIC KIDNEY DISEASE: ICD-10-CM

## 2025-07-18 DIAGNOSIS — N18.31 ANEMIA IN STAGE 3A CHRONIC KIDNEY DISEASE: ICD-10-CM

## 2025-07-18 DIAGNOSIS — D63.1 ANEMIA IN CHRONIC KIDNEY DISEASE, UNSPECIFIED CKD STAGE: ICD-10-CM

## 2025-07-18 DIAGNOSIS — D50.9 IRON DEFICIENCY ANEMIA, UNSPECIFIED IRON DEFICIENCY ANEMIA TYPE: ICD-10-CM

## 2025-07-18 PROCEDURE — 96372 THER/PROPH/DIAG INJ SC/IM: CPT | Mod: HCNC

## 2025-07-18 PROCEDURE — 63600175 PHARM REV CODE 636 W HCPCS: Mod: JZ,EC,TB,HCNC | Performed by: INTERNAL MEDICINE

## 2025-07-18 RX ADMIN — EPOETIN ALFA-EPBX 40000 UNITS: 40000 INJECTION, SOLUTION INTRAVENOUS; SUBCUTANEOUS at 12:07

## 2025-07-18 NOTE — PLAN OF CARE
RN sent medication and pre op guidelines via portal message.  These were re-sent d/t surgery was rescheduled and pt last received med instructions and verbal instructions on 6/18/25.    Estrellita De Souza RN BSN

## 2025-07-18 NOTE — PLAN OF CARE
Patient entered unit for retacrit 40k injection, VSS, no s/s of distress observed. Patient explains that they have been experiencing shortness of breath and tiredness and that the retacrit 40k injection manages these symptoms.Plan of care reviewed and retacrit administered sub q via right arm. Patient tolerated treatment well. Appt calendar printed. Patient exited off unit using motorized wheelchair, no s/s of distress.

## 2025-07-22 ENCOUNTER — TELEPHONE (OUTPATIENT)
Facility: CLINIC | Age: 80
End: 2025-07-22
Payer: MEDICARE

## 2025-07-22 DIAGNOSIS — E11.44 CONTROLLED TYPE 2 DIABETES MELLITUS WITH DIABETIC AMYOTROPHY, WITH LONG-TERM CURRENT USE OF INSULIN: ICD-10-CM

## 2025-07-22 DIAGNOSIS — E11.22 DIABETES MELLITUS WITH STAGE 3 CHRONIC KIDNEY DISEASE: ICD-10-CM

## 2025-07-22 DIAGNOSIS — D86.9 SARCOIDOSIS: ICD-10-CM

## 2025-07-22 DIAGNOSIS — N18.30 DIABETES MELLITUS WITH STAGE 3 CHRONIC KIDNEY DISEASE: ICD-10-CM

## 2025-07-22 DIAGNOSIS — Z79.4 CONTROLLED TYPE 2 DIABETES MELLITUS WITH DIABETIC AMYOTROPHY, WITH LONG-TERM CURRENT USE OF INSULIN: ICD-10-CM

## 2025-07-22 PROBLEM — M65.322 TRIGGER FINGER OF ALL DIGITS OF LEFT HAND: Status: ACTIVE | Noted: 2025-07-22

## 2025-07-22 PROBLEM — M65.332 TRIGGER FINGER OF ALL DIGITS OF LEFT HAND: Status: ACTIVE | Noted: 2025-07-22

## 2025-07-22 PROBLEM — M65.312 TRIGGER FINGER OF ALL DIGITS OF LEFT HAND: Status: ACTIVE | Noted: 2025-07-22

## 2025-07-22 PROBLEM — M65.342 TRIGGER FINGER OF ALL DIGITS OF LEFT HAND: Status: ACTIVE | Noted: 2025-07-22

## 2025-07-22 PROBLEM — M65.352 TRIGGER FINGER OF ALL DIGITS OF LEFT HAND: Status: ACTIVE | Noted: 2025-07-22

## 2025-07-22 RX ORDER — TRAMADOL HYDROCHLORIDE 50 MG/1
50 TABLET, FILM COATED ORAL EVERY 6 HOURS
Qty: 20 TABLET | Refills: 0 | Status: SHIPPED | OUTPATIENT
Start: 2025-07-22

## 2025-07-22 RX ORDER — INSULIN GLARGINE 100 [IU]/ML
30 INJECTION, SOLUTION SUBCUTANEOUS 2 TIMES DAILY
Qty: 54 ML | Refills: 1 | Status: SHIPPED | OUTPATIENT
Start: 2025-07-22 | End: 2026-01-18

## 2025-07-22 RX ORDER — IBUPROFEN 600 MG/1
600 TABLET, FILM COATED ORAL 3 TIMES DAILY
Qty: 20 TABLET | Refills: 0 | Status: SHIPPED | OUTPATIENT
Start: 2025-07-22

## 2025-07-22 RX ORDER — PEN NEEDLE, DIABETIC 30 GX3/16"
NEEDLE, DISPOSABLE MISCELLANEOUS
Qty: 100 EACH | Refills: 3 | Status: SHIPPED | OUTPATIENT
Start: 2025-07-22

## 2025-07-22 RX ORDER — ACETAMINOPHEN 500 MG
1000 TABLET ORAL 2 TIMES DAILY
Qty: 20 TABLET | Refills: 0 | Status: SHIPPED | OUTPATIENT
Start: 2025-07-22

## 2025-07-22 NOTE — TELEPHONE ENCOUNTER
Refill Routing Note   Medication(s) are not appropriate for processing by [facility] for the following reason(s):        Required vitals abnormal    [facility] action(s):  Defer  Approve               Appointments  past 12m or future 3m with PCP    Date Provider   Last Visit   3/29/2025 Micaela Mendoza MD   Next Visit   7/30/2025 iMcaela Mendoza MD   ED visits in past 90 days: 0        Note composed:5:00 PM 07/22/2025

## 2025-07-22 NOTE — TELEPHONE ENCOUNTER
No care due was identified.  [facility] Embedded Care Due Messages. Reference number: [id number].   7/22/2025 12:12:30 PM CDT

## 2025-07-23 ENCOUNTER — HOSPITAL ENCOUNTER (OUTPATIENT)
Facility: HOSPITAL | Age: 80
Discharge: HOME | End: 2025-07-23
Attending: ORTHOPAEDIC SURGERY | Admitting: ORTHOPAEDIC SURGERY
Payer: MEDICARE

## 2025-07-23 VITALS
SYSTOLIC BLOOD PRESSURE: 138 MMHG | HEART RATE: 95 BPM | HEIGHT: 62 IN | OXYGEN SATURATION: 99 % | WEIGHT: 149 LBS | TEMPERATURE: 98 F | DIASTOLIC BLOOD PRESSURE: 88 MMHG | RESPIRATION RATE: 18 BRPM | BODY MASS INDEX: 27.42 KG/M2

## 2025-07-23 DIAGNOSIS — M65.322 TRIGGER FINGER OF ALL DIGITS OF LEFT HAND: Primary | ICD-10-CM

## 2025-07-23 DIAGNOSIS — M79.642 PAIN OF LEFT HAND: ICD-10-CM

## 2025-07-23 DIAGNOSIS — M65.352 TRIGGER FINGER OF ALL DIGITS OF LEFT HAND: Primary | ICD-10-CM

## 2025-07-23 DIAGNOSIS — M65.312 TRIGGER FINGER OF ALL DIGITS OF LEFT HAND: Primary | ICD-10-CM

## 2025-07-23 DIAGNOSIS — M65.332 TRIGGER FINGER OF ALL DIGITS OF LEFT HAND: Primary | ICD-10-CM

## 2025-07-23 DIAGNOSIS — M65.342 TRIGGER FINGER OF ALL DIGITS OF LEFT HAND: Primary | ICD-10-CM

## 2025-07-23 LAB
POCT GLUCOSE: 119 MG/DL (ref 70–110)
POCT GLUCOSE: 141 MG/DL (ref 70–110)

## 2025-07-23 PROCEDURE — 71000033 HC RECOVERY, INTIAL HOUR: Performed by: ORTHOPAEDIC SURGERY

## 2025-07-23 PROCEDURE — 64415 NJX AA&/STRD BRCH PLXS IMG: CPT | Performed by: ANESTHESIOLOGY

## 2025-07-23 PROCEDURE — 63600175 PHARM REV CODE 636 W HCPCS: Performed by: ORTHOPAEDIC SURGERY

## 2025-07-23 PROCEDURE — 25000003 PHARM REV CODE 250: Performed by: NURSE ANESTHETIST, CERTIFIED REGISTERED

## 2025-07-23 PROCEDURE — 63600175 PHARM REV CODE 636 W HCPCS: Performed by: NURSE ANESTHETIST, CERTIFIED REGISTERED

## 2025-07-23 PROCEDURE — 36000707: Performed by: ORTHOPAEDIC SURGERY

## 2025-07-23 PROCEDURE — 25000003 PHARM REV CODE 250: Performed by: ORTHOPAEDIC SURGERY

## 2025-07-23 PROCEDURE — 71000015 HC POSTOP RECOV 1ST HR: Performed by: ORTHOPAEDIC SURGERY

## 2025-07-23 PROCEDURE — 82962 GLUCOSE BLOOD TEST: CPT | Performed by: ORTHOPAEDIC SURGERY

## 2025-07-23 PROCEDURE — 63600175 PHARM REV CODE 636 W HCPCS: Performed by: ANESTHESIOLOGY

## 2025-07-23 PROCEDURE — 25000003 PHARM REV CODE 250: Performed by: ANESTHESIOLOGY

## 2025-07-23 PROCEDURE — 37000008 HC ANESTHESIA 1ST 15 MINUTES: Performed by: ORTHOPAEDIC SURGERY

## 2025-07-23 PROCEDURE — 94761 N-INVAS EAR/PLS OXIMETRY MLT: CPT

## 2025-07-23 PROCEDURE — 99900035 HC TECH TIME PER 15 MIN (STAT)

## 2025-07-23 PROCEDURE — 36000706: Performed by: ORTHOPAEDIC SURGERY

## 2025-07-23 PROCEDURE — 37000009 HC ANESTHESIA EA ADD 15 MINS: Performed by: ORTHOPAEDIC SURGERY

## 2025-07-23 PROCEDURE — 26055 INCISE FINGER TENDON SHEATH: CPT | Mod: FA,F1,F2,F3 | Performed by: ORTHOPAEDIC SURGERY

## 2025-07-23 RX ORDER — PROPOFOL 10 MG/ML
VIAL (ML) INTRAVENOUS
Status: DISCONTINUED | OUTPATIENT
Start: 2025-07-23 | End: 2025-07-23

## 2025-07-23 RX ORDER — ONDANSETRON HYDROCHLORIDE 2 MG/ML
INJECTION, SOLUTION INTRAVENOUS
Status: DISCONTINUED | OUTPATIENT
Start: 2025-07-23 | End: 2025-07-23

## 2025-07-23 RX ORDER — FAMOTIDINE 10 MG/ML
INJECTION, SOLUTION INTRAVENOUS
Status: DISCONTINUED | OUTPATIENT
Start: 2025-07-23 | End: 2025-07-23

## 2025-07-23 RX ORDER — CEFAZOLIN 2 G/1
2 INJECTION, POWDER, FOR SOLUTION INTRAMUSCULAR; INTRAVENOUS
Status: DISCONTINUED | OUTPATIENT
Start: 2025-07-23 | End: 2025-07-23 | Stop reason: HOSPADM

## 2025-07-23 RX ORDER — FENTANYL CITRATE 50 UG/ML
25-200 INJECTION, SOLUTION INTRAMUSCULAR; INTRAVENOUS
Status: DISCONTINUED | OUTPATIENT
Start: 2025-07-23 | End: 2025-07-23 | Stop reason: HOSPADM

## 2025-07-23 RX ORDER — BUPIVACAINE HYDROCHLORIDE 5 MG/ML
INJECTION, SOLUTION EPIDURAL; INTRACAUDAL; PERINEURAL
Status: COMPLETED | OUTPATIENT
Start: 2025-07-23 | End: 2025-07-23

## 2025-07-23 RX ORDER — GLUCAGON 1 MG
1 KIT INJECTION
Status: DISCONTINUED | OUTPATIENT
Start: 2025-07-23 | End: 2025-07-23 | Stop reason: HOSPADM

## 2025-07-23 RX ORDER — MUPIROCIN 20 MG/G
OINTMENT TOPICAL
Status: DISCONTINUED | OUTPATIENT
Start: 2025-07-23 | End: 2025-07-23 | Stop reason: HOSPADM

## 2025-07-23 RX ORDER — MIDAZOLAM HYDROCHLORIDE 1 MG/ML
2 INJECTION, SOLUTION INTRAMUSCULAR; INTRAVENOUS
Status: DISCONTINUED | OUTPATIENT
Start: 2025-07-23 | End: 2025-07-23 | Stop reason: HOSPADM

## 2025-07-23 RX ORDER — CELECOXIB 200 MG/1
400 CAPSULE ORAL
Status: DISCONTINUED | OUTPATIENT
Start: 2025-07-23 | End: 2025-07-23 | Stop reason: HOSPADM

## 2025-07-23 RX ORDER — DULOXETIN HYDROCHLORIDE 60 MG/1
60 CAPSULE, DELAYED RELEASE ORAL DAILY
Qty: 90 CAPSULE | Refills: 2 | Status: SHIPPED | OUTPATIENT
Start: 2025-07-23

## 2025-07-23 RX ORDER — PROPOFOL 10 MG/ML
VIAL (ML) INTRAVENOUS CONTINUOUS PRN
Status: DISCONTINUED | OUTPATIENT
Start: 2025-07-23 | End: 2025-07-23

## 2025-07-23 RX ORDER — LIDOCAINE HYDROCHLORIDE 20 MG/ML
INJECTION INTRAVENOUS
Status: DISCONTINUED | OUTPATIENT
Start: 2025-07-23 | End: 2025-07-23

## 2025-07-23 RX ORDER — ACETAMINOPHEN 500 MG
1000 TABLET ORAL
Status: COMPLETED | OUTPATIENT
Start: 2025-07-23 | End: 2025-07-23

## 2025-07-23 RX ADMIN — PROPOFOL 30 MG: 10 INJECTION, EMULSION INTRAVENOUS at 07:07

## 2025-07-23 RX ADMIN — ONDANSETRON 4 MG: 2 INJECTION INTRAMUSCULAR; INTRAVENOUS at 07:07

## 2025-07-23 RX ADMIN — FENTANYL CITRATE 50 MCG: 50 INJECTION, SOLUTION INTRAMUSCULAR; INTRAVENOUS at 07:07

## 2025-07-23 RX ADMIN — FAMOTIDINE 20 MG: 10 INJECTION, SOLUTION INTRAVENOUS at 08:07

## 2025-07-23 RX ADMIN — BUPIVACAINE HYDROCHLORIDE 30 ML: 5 INJECTION, SOLUTION EPIDURAL; INTRACAUDAL at 07:07

## 2025-07-23 RX ADMIN — SODIUM CHLORIDE: 0.9 INJECTION, SOLUTION INTRAVENOUS at 08:07

## 2025-07-23 RX ADMIN — ACETAMINOPHEN 1000 MG: 500 TABLET ORAL at 06:07

## 2025-07-23 RX ADMIN — PROPOFOL 50 MCG/KG/MIN: 10 INJECTION, EMULSION INTRAVENOUS at 07:07

## 2025-07-23 RX ADMIN — MIDAZOLAM 1 MG: 1 INJECTION INTRAMUSCULAR; INTRAVENOUS at 07:07

## 2025-07-23 RX ADMIN — SODIUM CHLORIDE: 0.9 INJECTION, SOLUTION INTRAVENOUS at 07:07

## 2025-07-23 RX ADMIN — CEFAZOLIN 2 G: 2 INJECTION, POWDER, FOR SOLUTION INTRAMUSCULAR; INTRAVENOUS at 08:07

## 2025-07-23 RX ADMIN — MUPIROCIN: 20 OINTMENT TOPICAL at 06:07

## 2025-07-23 RX ADMIN — LIDOCAINE HYDROCHLORIDE 60 MG: 20 INJECTION INTRAVENOUS at 07:07

## 2025-07-23 NOTE — TRANSFER OF CARE
"Anesthesia Transfer of Care Note    Patient: Regino Lawrence    Procedure(s) Performed: Procedure(s) (LRB):  RELEASE, TRIGGER FINGER (Left)    Patient location: PACU    Anesthesia Type: general and regional    Transport from OR: Transported from OR on 6-10 L/min O2 by face mask with adequate spontaneous ventilation    Post pain: adequate analgesia    Post assessment: no apparent anesthetic complications    Post vital signs: stable    Level of consciousness: awake, alert and oriented    Nausea/Vomiting: no nausea/vomiting    Complications: none    Transfer of care protocol was followed      Last vitals: Visit Vitals  /81 (BP Location: Right arm, Patient Position: Lying)   Pulse 83   Temp 36.8 °C (98.2 °F) (Temporal)   Resp (!) 22   Ht 5' 2" (1.575 m)   Wt 67.6 kg (149 lb)   LMP  (LMP Unknown)   SpO2 99%   Breastfeeding No   BMI 27.25 kg/m²     "

## 2025-07-23 NOTE — ANESTHESIA PROCEDURE NOTES
Left supraclavicular ss    Patient location during procedure: pre-op   Block not for primary anesthetic.  Reason for block: at surgeon's request and post-op pain management   Post-op Pain Location: Left hand pain   Start time: 7/23/2025 7:05 AM  Timeout: 7/23/2025 7:05 AM   End time: 7/23/2025 7:10 AM    Staffing  Authorizing Provider: Channing Erwin MD  Performing Provider: Channing Erwin MD    Staffing  Performed by: Channing Erwin MD  Authorized by: Channing Erwin MD    Preanesthetic Checklist  Completed: patient identified, IV checked, site marked, risks and benefits discussed, surgical consent, monitors and equipment checked, pre-op evaluation and timeout performed  Peripheral Block  Patient position: supine  Prep: ChloraPrep  Patient monitoring: heart rate, cardiac monitor, continuous pulse ox, continuous capnometry and frequent blood pressure checks  Block type: supraclavicular  Laterality: left  Injection technique: single shot  Needle  Needle type: Stimuplex   Needle gauge: 22 G  Needle length: 2 in  Needle localization: anatomical landmarks and ultrasound guidance   -ultrasound image captured on disc.  Assessment  Injection assessment: negative aspiration, negative parasthesia and local visualized surrounding nerve  Paresthesia pain: none  Heart rate change: no  Slow fractionated injection: yes    Medications:    Medications: bupivacaine (pf) (MARCAINE) injection 0.5% - Perineural   30 mL - 7/23/2025 7:10:00 AM    Additional Notes  VSS.  [facility] RN monitoring vitals throughout procedure.  Patient tolerated procedure well.     + 10ml 0.5% bupivacaine ICB

## 2025-07-23 NOTE — ANESTHESIA PREPROCEDURE EVALUATION
07/23/2025  Regino Lawrence is a 79 y.o., female.      Pre-op Assessment    I have reviewed the Patient Summary Reports.     I have reviewed the Nursing Notes. I have reviewed the NPO Status.   I have reviewed the Medications.     Review of Systems  Anesthesia Hx:  No problems with previous Anesthesia   History of prior surgery of interest to airway management or planning:  Previous anesthesia: General        Denies Family Hx of Anesthesia complications.    Denies Personal Hx of Anesthesia complications.                    Social:  Non-Smoker       Hematology/Oncology:  Hematology Normal   Oncology Normal                                   EENT/Dental:  EENT/Dental Normal           Cardiovascular:  Exercise tolerance: good   Hypertension            FAUSTIN                              Pulmonary:   COPD, mild   Shortness of breath                  Renal/:  Chronic Renal Disease, CKD                Hepatic/GI:     GERD Liver Disease,               Musculoskeletal:  Arthritis               Neurological:    Neuromuscular Disease,   Seizures, well controlled                                Endocrine:  Diabetes, type 2 Hypothyroidism          Dermatological:  Skin Normal    Psych:  Psychiatric History anxiety depression                Physical Exam  General: Well nourished, Cooperative, Alert and Oriented    Airway:  Mallampati: III / II  Mouth Opening: Normal  TM Distance: Normal  Tongue: Normal  Neck ROM: Normal ROM    Dental:  Dentures    Chest/Lungs:  Clear to auscultation, Normal Respiratory Rate    Heart:  Rate: Normal  Rhythm: Regular Rhythm  Sounds: Normal    Abdomen:  Normal, Soft, Nontender        Anesthesia Plan  Type of Anesthesia, risks & benefits discussed:    Anesthesia Type: Gen Supraglottic Airway, Gen Natural Airway, Regional, MAC  Intra-op Monitoring Plan: Standard ASA Monitors  Post Op Pain Control  Plan: multimodal analgesia and peripheral nerve block  Induction:  IV  Airway Plan: Direct, Post-Induction  Informed Consent: Informed consent signed with the Patient and all parties understand the risks and agree with anesthesia plan.  All questions answered.   ASA Score: 3    Ready For Surgery From Anesthesia Perspective.     .

## 2025-07-23 NOTE — H&P
[facility]  History & Physical  Orthopedics     SUBJECTIVE:       History of Present Illness    CHIEF COMPLAINT:  - Left hand pain and stiffness, with concern for trigger fingers.     HPI:  Regino presents with left hand pain and stiffness, which have been present for a year and progressively worsening. The pain is severe, particularly at night, interfering with sleep. She reports significant difficulty sleeping due to the pain. Significant finger swelling interferes with blood sugar checks due to diabetes.     She reports a history of trigger fingers in the left hand. Initially, some fingers would get stuck and then pop open, but now all fingers are affected. She has difficulty moving fingers and reports minimal use of the hand. Various remedies have been tried without success, including pain cream, Tylenol, and a hand brace. Recently, a steroid injection was received from the rheumatologist, Dr. Barr, for inflammation.     She has a history of carpal tunnel surgery on the right hand and uses a scooter for mobility. There are no recent changes in diabetes management or use of injectable medications other than insulin.     Regino denies having a pacemaker, defibrillator, or heart stents.     PREVIOUS TREATMENTS:  - Hand brace: Ineffective, sometimes exacerbates the condition  - Pain cream: Ineffective  - Tylenol: Ineffective  - Steroid injection: Received from rheumatologist (Dr. Barr) for inflammation, date not specified     MEDICATIONS:  - Insulin: For diabetes  - Blood thinners: For blood clots  - Tylenol  - Pain cream     SURGICAL HISTORY:  - Carpal tunnel surgery: Right hand     WORK STATUS:  - Regino is disabled  - Uses a scooter for mobility        ROS:  Constitutional: +sleep disturbances, +nightime pain, +difficulty staying asleep  Cardiovascular: +upper extremity edema  Musculoskeletal: +joint pain, +limb pain, +limb swelling, +limited movement, +pain with movement                   Past Medical History:   Diagnosis Date    Acid reflux      Allergy      Alopecia      Anemia      Anemia in CKD (chronic kidney disease) 2016    Anxiety      Arthritis      Back pain      Cataract      Chronic kidney disease      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, without long-term current use of insulin      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, without long-term current use of insulin      Dementia, unspecified dementia severity, unspecified dementia type, unspecified whether behavioral, psychotic, or mood disturbance or anxiety 2025    Depression      Diabetes mellitus, type 2      Eye injury as a child      k-abrasion  od    Hyperlipidemia      Hypertension      Hypothyroidism      Immune deficiency disorder      Immune disorder      LOC (loss of consciousness) 2021     at home    Myalgia and myositis 2012    Osteoporosis      Polyneuropathy      Pulmonary embolism 07/10/2021    Renal manifestation of secondary diabetes mellitus      Sarcoidosis      Seizure      Type 2 diabetes mellitus      Ulcer       no cancer    Urinary incontinence                  Past Surgical History:   Procedure Laterality Date    CARPAL TUNNEL RELEASE         Rt wrist    CATARACT EXTRACTION W/  INTRAOCULAR LENS IMPLANT Right 2015     Dr. Azevedo    CATARACT EXTRACTION W/  INTRAOCULAR LENS IMPLANT Left 2015     Dr. Azevedo    CERVICAL SPINE SURGERY         SECTION        CHOLECYSTECTOMY        INJECTION OF ANESTHETIC AGENT AROUND NERVE Left 2020     Procedure: BLOCK, NERVE LEFT FEMORAL AND OBTURATOR;  Surgeon: Alfonso Richards MD;  Location: [facility]IN [facility];  Service: Pain Management;  Laterality: Left;  NEEDS CONSENT    INJECTION OF ANESTHETIC AGENT AROUND NERVE Bilateral 10/09/2023     Procedure: BLOCK, NERVE, BILATERAL L3-L4-L5 MEDIAL BRANCH;  Surgeon: Alfonso Richards MD;   Location: [facility] PAIN [facility]GT;  Service: Pain Management;  Laterality: Bilateral;    INJECTION OF JOINT Left 03/21/2019     Procedure: Injection, Joint  fLUOROSCOPIC jOINT iNJECTION (hIP iNJECTION) LEFT ROCH BURSA AS WELL LEFT TROCHANTERIC BURSA;  Surgeon: Alfonso Richards MD;  Location: [facility] PAIN [facility]GT;  Service: Pain Management;  Laterality: Left;  NEEDS CONSENT, DIABETIC    INJECTION OF JOINT Left 07/22/2019     Procedure: Injection, Joint FLUOROSCOPIC JOINT INJECTION (HIP INJECTION) LEFT HIP;  Surgeon: Alfonso Richards MD;  Location: [facility] PAIN [facility]GT;  Service: Pain Management;  Laterality: Left;  NEEDS CONSENT    INJECTION OF JOINT Left 09/12/2019     Procedure: INJECTION, JOINT;  Surgeon: Alfonso Richards MD;  Location: [facility]IN [facility]GT;  Service: Pain Management;  Laterality: Left;  Left Hip and Left GTB Injections    INJECTION OF JOINT Left 07/27/2020     Procedure: INJECTION, JOINT, LEFT HIP and LEFT GREATER TROCHANTERIC BURSA;  Surgeon: Alfonso Richards MD;  Location: [facility]IN [facility];  Service: Pain Management;  Laterality: Left;  INJECTION, JOINT, LEFT HIP and LEFT GREATER TROCHANTERIC BURSA    INJECTION OF JOINT Left 09/03/2020     Procedure: INJECTION, JOINT, LEFT SI;  Surgeon: Alfonso Richards MD;  Location: [facility];  Service: Pain Management;  Laterality: Left;  INJECTION, JOINT, LEFT SI    TRANSFORAMINAL EPIDURAL INJECTION OF STEROID Bilateral 12/05/2019     Procedure: INJECTION, STEROID, EPIDURAL, TRANSFORAMINAL APPROACH;  Surgeon: Alfonso Richards MD;  Location: [facility];  Service: Pain Management;  Laterality: Bilateral;  B/L TF RHIANNA L5  Consent Needed    TRANSFORAMINAL EPIDURAL INJECTION OF STEROID Bilateral 06/29/2020     Procedure: INJECTION, STEROID, EPIDURAL, TRANSFORAMINAL APPROACH L5/S1;  Surgeon: Alfonso Richards MD;  Location: [facility];  Service: Pain Management;  Laterality: Bilateral;  B/L TF RHIANNA L5/S1    TUBAL LIGATION                    Review of patient's allergies indicates:   Allergen Reactions    Azathioprine  Shortness Of Breath and Other (See Comments)       Fatigue      Social History            Social History Narrative    Not on file                  Family History   Problem Relation Name Age of Onset    Hypertension Mother Martial      Cataracts Mother Martial      No Known Problems Father        Hypertension Maternal Grandmother Nora      Glaucoma Sister Cristina      Arthritis Sister Cristina      No Known Problems Brother Kofi      No Known Problems Maternal Aunt        No Known Problems Maternal Uncle        No Known Problems Paternal Aunt        No Known Problems Paternal Uncle        No Known Problems Maternal Grandfather        No Known Problems Paternal Grandmother        No Known Problems Paternal Grandfather        Kidney failure Sister Rita      Hepatitis Sister Deepali      Cancer Sister Deepali           bone cancer     Immunodeficiency Sister Christiana      Diabetes Son x4      Hypertension Son x4      Lupus Neg Hx        Rheum arthritis Neg Hx        Amblyopia Neg Hx        Blindness Neg Hx        Macular degeneration Neg Hx        Retinal detachment Neg Hx        Strabismus Neg Hx        Stroke Neg Hx        Thyroid disease Neg Hx        Endometrial cancer Neg Hx        Vaginal cancer Neg Hx        Cervical cancer Neg Hx             [Current Medications]    [Current Medications]     Current Outpatient Medications:     (Magic mouthwash) 1:1:1 diphenhydrAMINE(Benadryl) 12.5mg/5ml liq, aluminum & magnesium hydroxide-simethicone (Maalox), LIDOcaine viscous 2%, Swish and spit 5 mLs every 4 (four) hours as needed (mouth pain). for mouth sores, Disp: 150 mL, Rfl: 0    ACCU-CHEK FASTCLIX LANCING DEV Kit, USE AS DIRECTED., Disp: 1 each, Rfl: 0    alpha lipoic acid 600 mg Cap, Take 600 mg by mouth once daily., Disp: 60 each, Rfl: 3    apixaban (ELIQUIS) 5 mg Tab, Take 1 tablet (5 mg total) by mouth 2 (two) times daily. Start this prescription after finishing starter pack, Disp: 60 tablet, Rfl: 5    atorvastatin  (LIPITOR) 20 MG tablet, Take 1 tablet (20 mg total) by mouth once daily., Disp: 90 tablet, Rfl: 3    blood sugar diagnostic (ACCU-CHEK SMARTVIEW TEST STRIP) Strp, Use as directed to check blood sugar twice daily., Disp: 200 strip, Rfl: 3    blood sugar diagnostic Strp, To check BG three times daily, to use with insurance preferred meter, Disp: 300 each, Rfl: 3    blood-glucose meter kit, Use as instructed, Disp: 1 each, Rfl: 0    calcium carb/vit D3/minerals (CALCIUM-VITAMIN D ORAL), Take 1 tablet by mouth once daily., Disp: , Rfl:     carvediloL (COREG) 25 MG tablet, Take 1 tablet (25 mg total) by mouth 2 (two) times daily., Disp: 180 tablet, Rfl: 3    cetirizine (ZYRTEC) 10 MG tablet, Take 1 tablet (10 mg total) by mouth once daily., Disp: 30 tablet, Rfl: 3    clotrimazole (LOTRIMIN) 1 % cream, Apply topically 2 (two) times daily., Disp: 45 g, Rfl: 0    cyanocobalamin, vitamin B-12, (VITAMIN B-12 ORAL), Take 1 tablet by mouth once daily., Disp: , Rfl:     diclofenac sodium (VOLTAREN) 1 % Gel, Apply 2 g topically once daily., Disp: 100 g, Rfl: 3    DULoxetine (CYMBALTA) 60 MG capsule, Take 1 capsule (60 mg total) by mouth once daily., Disp: 90 capsule, Rfl: 3    fenofibrate 160 MG Tab, Take 1 tablet by mouth once daily, Disp: 90 tablet, Rfl: 1    folic acid (FOLVITE) 1 MG tablet, Take 1 tablet (1,000 mcg total) by mouth once daily., Disp: 90 tablet, Rfl: 1    levETIRAcetam (KEPPRA) 750 MG Tab, Take 1 tablet (750 mg total) by mouth 2 (two) times daily., Disp: 60 tablet, Rfl: 11    levothyroxine (SYNTHROID) 50 MCG tablet, Take 1 tablet (50 mcg total) by mouth before breakfast., Disp: 90 tablet, Rfl: 3    linaCLOtide (LINZESS) 72 mcg Cap capsule, Take 1 capsule (72 mcg total) by mouth before breakfast., Disp: 90 capsule, Rfl: 3    magnesium 200 mg Tab, 1 pill by mouth daily, Disp: 4 each, Rfl: 0    memantine (NAMENDA) 5 MG Tab, Take 1 tablet (5 mg total) by mouth 2 (two) times daily., Disp: 60 tablet, Rfl: 11     "NIFEdipine (PROCARDIA-XL) 60 MG (OSM) 24 hr tablet, Take 1 tablet (60 mg total) by mouth 2 (two) times a day., Disp: 180 tablet, Rfl: 1    pantoprazole (PROTONIX) 40 MG tablet, Take 1 tablet (40 mg total) by mouth once daily., Disp: 90 tablet, Rfl: 3    pen needle, diabetic (BD ULTRA-FINE ANA PEN NEEDLE) 32 gauge x 5/32" Ndle, For use with insulin pen, Disp: 100 each, Rfl: 3    potassium chloride SA (K-DUR,KLOR-CON M) 10 MEQ tablet, Take 1 tablet (10 mEq total) by mouth 2 (two) times daily., Disp: 4 tablet, Rfl: 0    predniSONE (DELTASONE) 1 MG tablet, Take 4 tablets (4 mg total) by mouth once daily., Disp: 120 tablet, Rfl: 2    pregabalin (LYRICA) 75 MG capsule, Take 1 capsule (75 mg total) by mouth 2 (two) times daily., Disp: 60 capsule, Rfl: 5    tiZANidine (ZANAFLEX) 2 MG tablet, Take 2 tablets (4 mg total) by mouth every 8 (eight) hours as needed (muscle spasm)., Disp: 270 tablet, Rfl: 1    albuterol-ipratropium (DUO-NEB) 2.5 mg-0.5 mg/3 mL nebulizer solution, Take 3 mLs by nebulization every 4 (four) hours as needed for Wheezing or Shortness of Breath. Rescue, Disp: 90 each, Rfl: 11    fluticasone propion-salmeterol 115-21 mcg/dose (ADVAIR HFA) 115-21 mcg/actuation HFAA inhaler, Inhale 2 puffs into the lungs every 12 (twelve) hours. Controller, Disp: 12 g, Rfl: 3    insulin glargine U-100, Lantus, (LANTUS SOLOSTAR U-100 INSULIN) 100 unit/mL (3 mL) InPn pen, Inject 30 Units into the skin 2 (two) times a day., Disp: 54 mL, Rfl: 1     Current Facility-Administered Medications:     denosumab (PROLIA) injection 60 mg, 60 mg, Subcutaneous, Q6 Months, , 60 mg at 12/23/24 1048     OBJECTIVE:       Vital Signs (Most Recent):  Vitals   There were no vitals filed for this visit.      There is no height or weight on file to calculate BMI.     Physical Exam    Musculoskeletal: Index finger trigger (Left). Thumb trigger (Left). Middle finger trigger (Left). Swelling in left hand. Pain in left hand. Stiffness in left hand. "          Left Hand/Wrist Examination:     Observation/Inspection:  Swelling                       none                  Deformity                     none  Discoloration               none                  Scars                           none                  Atrophy                        none  Patient with severe tenderness palpation at the A1 pulleys of all fingers of the left hand with vivek triggering seen to the left long finger and clicking felt with the other digits with range of motion         HAND/WRIST EXAMINATION:  Finkelstein's Test                                Neg  WHAT Test                                         Neg  Snuff box tenderness                          Neg  Noriega's Test                                     Neg  Hook of Hamate Tenderness              Neg  CMC grind                                           Neg  Circumduction test                              Neg     Neurovascular Exam:  Digits WWP, brisk CR < 3s throughout  NVI motor/LTS to M/R/U nerves, radial pulse 2+  Tinel's Test - Carpal Tunnel                Neg  Tinel's Test - Cubital Tunnel               Neg  Phalen's Test                                      Neg  Median Nerve Compression TestNeg     ROM hand is markedly restricted to the left hand and very painful     ROM wrist full, painless    ROM elbow full, painless     Abdomen not guarded  Respirations nonlabored  Perfusion intact     Diagnostic Results:     Imaging - I independently viewed the patient's imaging as well as the radiology report.  Xrays of the patient's bilateral hands  demonstrate no evidence of any acute fractures or dislocations with some degenerative changes.     EMG - none     ASSESSMENT/PLAN:       79 y.o. yo female with trigger digits entire left hand  Plan: The patient and I had a thorough discussion today.  We discussed the working diagnosis as well as several other potential alternative diagnoses.  Treatment options were discussed, both conservative  and surgical.  Conservative treatment options would include things such as activity modifications, workplace modifications, a period of rest, oral vs topical OTC and prescription anti-inflammatory medications, occupational therapy, splinting/bracing, immobilization, corticosteroid injections, and others.  Surgical options were discussed as well.      Assessment & Plan    PROCEDURES:  - # Procedures  - Regino understands the risks and benefits and elects to proceed with trigger finger releases surgery for all 5 fingers of the left hand due to significant pain affecting sleep.  - Explained risks including stiffness, infection, damage to nerves, tendons, and blood vessels.  - Scheduled surgery for June 20th, pending medical clearance.  - Obtained surgical consent from patient.     At this point in time, the patient has severe trigger digits of the entire left hand.  She would like to proceed with A1 pulley releases of the left thumb, index, long, ring, and small fingers on June 20th 2025 which I feel is reasonable.  She will need perioperative risk stratification and clearance and will be referred to the preop clearance Center.  Follow-up for surgery.       The patient has not responded to adequate non operative treatment at this time and/or non operative treatment is not indicated. Thus, the risks, benefits and alternatives to surgery were discussed with the patient in detail.  Specific risks include but are not limited to bleeding, infection, vessel and/or nerve damage, pain, numbness, tingling, compartment syndrome, need for additional surgery, failure to return to pre-injury and/or preoperative functional status, inability to return to work, scar sensitivity, delayed healing, complex regional pain syndrome, weakness, pulley injury, tendon injury, bowstringing, partial and/or incomplete relief of symptoms, persistence of and/or worsening of symptoms, hardware and/or surgical failure, prominent and/or symptomatic  hardware possibly necessitating future removal, osteomyelitis, amputation, loss of function, stiffness, rotational malalignment, functional debility, dysfunction, decreased  strength, need for prolonged postoperative rehabilitation, malunion, nonunion, deep venous thrombosis, pulmonary embolism, arthritis and death.  The patient states an understanding and wishes to proceed with surgery.   All questions were answered.  No guarantees were implied or stated.  Written informed consent was obtained.     Should the patient's symptoms worsen, persist, or fail to improve they should return for reevaluation and I would be happy to see them back anytime.          Chon Dunham M.D.     Please be aware that this note has been generated with the assistance of [employer] voice-to-text.  Please excuse any spelling or grammatical errors.     Thank you for choosing Dr. Chon Dunham for your orthopedic hand and upper extremity care. It is our goal to provide you with exceptional care that will help keep you healthy, active, and get you back in the game.     If you felt that you received exemplary care today, please consider leaving feedback for Dr. Dunham on [employer] at [email or web address].     Please do not hesitate to reach out to us via email, phone, or MyChart with any questions, concerns, or feedback.

## 2025-07-23 NOTE — PLAN OF CARE
Need Blood refusal and anesthesia consent.    Bed in lowest, locked position with call light within reach. Side rails up X2. Questions answered. No apparent distress noted. Ongoing monitoring in place.

## 2025-07-23 NOTE — INTERVAL H&P NOTE
The patient has been examined and the H&P has been reviewed:    I concur with the findings and no changes have occurred since H&P was written.    Surgery risks, benefits and alternative options discussed and understood by patient/family.    The patient has not responded to adequate non operative treatment at this time and/or non operative treatment is not indicated. Thus, the risks, benefits and alternatives to surgery were discussed with the patient in detail.  Specific risks include but are not limited to bleeding, infection, vessel and/or nerve damage, pain, numbness, tingling, complex regional pain syndrome, compartment syndrome, failure to return to pre-injury and/or preoperative functional status, scar sensitivity, delayed healing, inability to return to work, pulley injury, tendon injury, bowstringing, partial and/or incomplete relief of symptoms, weakness, persistence of and/or worsening of symptoms, surgical failure, osteomyelitis, amputation, loss of function, stiffness, functional debility, dysfunction, decreased  strength, need for prolonged postoperative rehabilitation, need for further surgery, deep venous thrombosis, pulmonary embolism, arthritis and death.  The patient states an understanding and wishes to proceed with surgery.   All questions were answered.  No guarantees were implied or stated.  Written informed consent was obtained.        Active Hospital Problems    Diagnosis  POA    *Trigger finger of all digits of left hand [M65.322, M65.312, M65.342, M65.332, M65.352]  Yes      Resolved Hospital Problems   No resolved problems to display.

## 2025-07-23 NOTE — BRIEF OP NOTE
[place] - Surgery ([facility])  Brief Operative Note    Surgery Date: 7/23/2025     Surgeons and Role:     * Chon Dunham MD - Primary    Assisting Surgeon: [place] MD    Pre-op Diagnosis:  Trigger finger of all digits of left hand [M65.322, M65.312, M65.342, M65.332, M65.352]    Post-op Diagnosis:  Post-Op Diagnosis Codes:     * Trigger finger of all digits of left hand [M65.322, M65.312, M65.342, M65.332, M65.352]    Procedure(s) (LRB):  RELEASE, TRIGGER FINGER (Left)    Anesthesia: Regional    Operative Findings: see full op note    Estimated Blood Loss: * No values recorded between 7/23/2025  7:40 AM and 7/23/2025  8:02 AM *         Specimens:   Specimen (24h ago, onward)      None            * No specimens in log *        Discharge Note    OUTCOME: Patient tolerated treatment/procedure well without complication and is now ready for discharge.    DISPOSITION: Home or Self Care    FINAL DIAGNOSIS:   * Trigger finger of all digits of left hand [M65.322, M65.312, M65.342, M65.332, M65.352]    FOLLOWUP: In clinic    DISCHARGE INSTRUCTIONS:    Discharge Procedure Orders   Diet general     Call MD for:  temperature >100.4     Call MD for:  persistent nausea and vomiting     Call MD for:  severe uncontrolled pain     Call MD for:  difficulty breathing, headache or visual disturbances     Call MD for:  redness, tenderness, or signs of infection (pain, swelling, redness, odor or green/yellow discharge around incision site)     Call MD for:  hives     Call MD for:  persistent dizziness or light-headedness     Call MD for:  extreme fatigue     Leave dressing on - Keep it clean, dry, and intact until clinic visit     Keep surgical extremity elevated     Lifting restrictions   Order Comments: Do not lift more than the weight of a coffee cup with operative extremity until your post-op visit at Dr. Dunham's clinic. Gentle range of motion of fingers encouraged. Can discontinue sling once arm wakes up from  anesthesia.

## 2025-07-24 ENCOUNTER — TELEPHONE (OUTPATIENT)
Dept: ENDOSCOPY | Facility: HOSPITAL | Age: 80
End: 2025-07-24
Payer: MEDICARE

## 2025-07-24 NOTE — ANESTHESIA POSTPROCEDURE EVALUATION
Anesthesia Post Evaluation    Patient: Regino Lawrence    Procedure(s) Performed: Procedure(s) (LRB):  RELEASE, TRIGGER FINGER (Left)    Final Anesthesia Type: general      Patient location during evaluation: PACU  Patient participation: Yes- Able to Participate  Level of consciousness: awake and alert and oriented  Post-procedure vital signs: reviewed and stable  Pain management: adequate  Airway patency: patent    PONV status at discharge: No PONV  Anesthetic complications: no      Cardiovascular status: hemodynamically stable  Respiratory status: unassisted, spontaneous ventilation and room air  Hydration status: euvolemic  Follow-up not needed.              Vitals Value Taken Time   /88 07/23/25 09:46   Temp 36.6 °C (97.9 °F) 07/23/25 09:45   Pulse 94 07/23/25 09:50   Resp 18 07/23/25 09:45   SpO2 99 % 07/23/25 09:50   Vitals shown include unfiled device data.      Event Time   Out of Recovery 09:35:00         Pain/Jimena Score: Pain Rating Prior to Med Admin: 0 (7/23/2025  9:05 AM)  Pain Rating Post Med Admin: 0 (7/23/2025  9:55 AM)  Jimena Score: 10 (7/23/2025  9:55 AM)

## 2025-07-24 NOTE — TELEPHONE ENCOUNTER
Patient states the insurance didn't approved her egd/colon , patient request for orders to be cancelled

## 2025-07-24 NOTE — TELEPHONE ENCOUNTER
"----- Message from Carolyn sent at 4/30/2025  1:08 PM CDT -----  Regarding: FW: EGD/Colonoscopy    ----- Message -----  From: Kelli Bolivar PA-C  Sent: 4/30/2025  11:03 AM CDT  To: [facility] Endoscopy Schedulers  Subject: EGD/Colonoscopy                                  Procedure: EGD/Colonoscopy    Diagnosis: Screening colonoscopy and Dysphagia    Procedure Timing: Within 12 weeks    #If within 4 weeks selected, please epi as high priority#    #If greater than 12 weeks, please select "5-12 weeks" and delay sending until 3 months prior to requested date#     Location: Hospital Based ([address], [facility], [facility])    Additional Scheduling Information: Blood thinners    Prep Specifications:Extended/Constipation prep    Is the patient taking a GLP-1 Agonist:no    Have you attached a patient to this message: yes  "

## 2025-07-25 NOTE — OP NOTE
DATE OF PROCEDURE:  7/23/2025     SERVICE:  Orthopedics.     SURGEON:  Chon Dunham M.D.     FIRST ASSISTANT:  MD JASS Bay     PREOPERATIVE DIAGNOSIS:    1) left index finger trigger finger.  2) left long finger trigger finger  3) left ring finger trigger finger  4) left small finger trigger finger  5) left trigger thumb     POSTOPERATIVE DIAGNOSIS:    1) left index finger trigger finger.  2) left long finger trigger finger  3) left ring finger trigger finger  4) left small finger trigger finger  5) left trigger thumb     PROCEDURE PERFORMED:    1) A1 pulley release of left index finger.  2) A1 pulley release of left long finger.  3) A1 pulley release of left ring finger.  4) A1 pulley release of left small finger.  5) A1 pulley release left thumb     ANESTHESIA:  Regional     ESTIMATED BLOOD LOSS:  3 mL.     SPECIMENS:  None.     IMPLANTS:  None.     COMPLICATIONS:  None.     INTRAVENOUS FLUIDS:  Crystalloid.     TOURNIQUET TIME:  39 minutes at 250mmHg.     INDICATIONS FOR PROCEDURE:  The patient is a 79-year-old female who   presented to the Orthopedics Clinic complaining of significantly symptomatic   Trigger fingers of the left thumb, index finger, long finger, ring finger, and small finger.  The risks, benefits and alternatives to surgery were discussed with the patient in detail.  Specific risks discussed include but are not limited to bleeding, infection, vessel and/or nerve damage, pain, numbness, tingling, complex regional pain syndrome, compartment syndrome, failure to return to pre-injury and/or preoperative functional status, scar sensitivity, delayed healing, inability to return to work, pulley injury, tendon injury, bowstringing, partial and/or incomplete relief of symptoms, weakness, persistence of and/or worsening of symptoms, surgical failure, osteomyelitis, amputation, loss of function, stiffness, functional debility, dysfunction, decreased  strength, need for prolonged postoperative  rehabilitation, need for further surgery, deep venous thrombosis, pulmonary embolism, arthritis and death.  The patient states an understanding and wishes to proceed with surgery.   All questions were answered.  No guarantees were implied or stated.  Written informed consent was obtained.       PROCEDURE IN DETAIL:  On the date of the operative intervention, the patient was   evaluated in the preoperative holding area.  With their  participation, the operative digits were marked as the operative sites.  The patient was then administered regional anesthesia and wheeled to the   Operating Room with the left  upper extremity placed on a hand table.  A   nonsterile tourniquet was placed on the patient's upper arm.  The extremity was prepped and draped in the usual sterile fashion.  A timeout   was taken to confirm the correct patient, site and procedure.  All were in   agreement, so I proceeded.   After adequate   analgesia, an Esmarch was utilized to exsanguinate the extremity and   then tourniquet was insufflated to 250 mmHg where it remained for the duration   of the procedure.  I first marked out an approximately 1 cm incision overlying   the patient's A1 pulley of the the patient's left index finger.  This incision was made   sharply with a scalpel and dissection was carried down through the skin and   subcutaneous tissues.  The neurovascular bundles were retracted both radially   and ulnarly and protected for the duration of the procedure.  Dissection was   carried down more deeply to the level of the A1 pulley.    This was identified and exposed and then a scalpel was utilized to enter the   flexor tendon sheath with a small poke hole in the pulley.  At this time,   scissor dissection was then utilized to complete both proximal and distal   division of the A1 pulley in its entirety.  After direct visual confirmation   that the pulley transection was complete, I then utilized a Ragnell retractor to   retract the  flexor tendons out of the wound, which they did so without any   resistance and the fingers were taken through range of motion and were noted to   glide smoothly without any evidence of further constriction.  At this time, the wound was then irrigated copiously   with sterile saline and closed with 4-0 nylon in horizontal mattress fashion.      Having completed the prior A1 pulley release, attention was next turned toward A1 pulley release of the left long finger.  I then marked out an approximately 1 cm incision overlying   the patient's A1 pulley.  This incision was made   sharply with a scalpel and dissection was carried down through the skin and   subcutaneous tissues.  The neurovascular bundles were retracted both radially   and ulnarly and protected for the duration of the procedure.  Dissection was   carried down more deeply to the level of the A1 pulley.    This was identified and exposed and then a scalpel was utilized to enter the   flexor tendon sheath with a small poke hole in the pulley.  At this time,   scissor dissection was then utilized to complete both proximal and distal   division of the A1 pulley in its entirety.  After direct visual confirmation   that the pulley transection was complete, I then utilized a Ragnell retractor to   retract the flexor tendons out of the wound, which they did so without any   resistance and the fingers were taken through range of motion and were noted to   glide smoothly without any evidence of further constriction.   At this time, the wound was then irrigated copiously   with sterile saline and closed with 4-0 nylon in horizontal mattress fashion.      Having completed the prior A1 pulley release, attention was next turned toward A1 pulley release of the left ring finger.  I then marked out an approximately 1 cm incision overlying   the patient's A1 pulley.  This incision was made   sharply with a scalpel and dissection was carried down through the skin and    subcutaneous tissues.  The neurovascular bundles were retracted both radially   and ulnarly and protected for the duration of the procedure.  Dissection was   carried down more deeply to the level of the A1 pulley.    This was identified and exposed and then a scalpel was utilized to enter the   flexor tendon sheath with a small poke hole in the pulley.  At this time,   scissor dissection was then utilized to complete both proximal and distal   division of the A1 pulley in its entirety.  After direct visual confirmation   that the pulley transection was complete, I then utilized a Ragnell retractor to   retract the flexor tendons out of the wound, which they did so without any   resistance and the fingers were taken through range of motion and were noted to   glide smoothly without any evidence of further constriction.   At this time, the wound was then irrigated copiously   with sterile saline and closed with 4-0 nylon in horizontal mattress fashion.      Having completed the prior A1 pulley release, attention was next turned toward A1 pulley release of the left small finger.  I then marked out an approximately 1 cm incision overlying   the patient's A1 pulley.  This incision was made   sharply with a scalpel and dissection was carried down through the skin and   subcutaneous tissues.  The neurovascular bundles were retracted both radially   and ulnarly and protected for the duration of the procedure.  Dissection was   carried down more deeply to the level of the A1 pulley.    This was identified and exposed and then a scalpel was utilized to enter the   flexor tendon sheath with a small poke hole in the pulley.  At this time,   scissor dissection was then utilized to complete both proximal and distal   division of the A1 pulley in its entirety.  After direct visual confirmation   that the pulley transection was complete, I then utilized a Ragnell retractor to   retract the flexor tendons out of the wound, which  they did so without any   resistance and the fingers were taken through range of motion and were noted to   glide smoothly without any evidence of further constriction.   At this time, the wound was then irrigated copiously   with sterile saline and closed with 4-0 nylon in horizontal mattress fashion.  I next turned my attention towards the patient's left thumb A1 pulley release.      I then marked out an approximately 1 cm incision overlying   the patient's A1 pulley of the left thumb.  This incision was made   sharply with a scalpel and dissection was carried down through the skin and   subcutaneous tissues.  The neurovascular bundles were retracted both radially   and ulnarly and protected for the duration of the procedure.  Dissection was   carried down more deeply to the level of the A1 pulley.    This was identified and exposed and then a scalpel was utilized to enter the   flexor tendon sheath with a small poke hole in the pulley.  At this time,   scissor dissection was then utilized to complete both proximal and distal   division of the A1 pulley in its entirety.  After direct visual confirmation   that the pulley transection was complete, I then utilized a Ragnell retractor to   retract the flexor pollicis longus out of the wound, which did so without any   resistance and the thumb was taken through range of motion and noted to   glide smoothly without any evidence of further constriction.  At this time, the wound was then irrigated copiously   with sterile saline and closed with 4-0 nylon in horizontal mattress fashion.      Sterile dressings were then applied consisting of Xeroform, 4 x 4 gauze, and an Ace wrap.  The tourniquet was then   deflated and brisk capillary refill ensued throughout the patient's hand.  The patient was then awakened from anesthesia   and returned to the Postanesthesia Care Unit in stable condition.  There were no   complications.  As the attending surgeon, I was present and  performed the   critical portion of the procedure.     POSTOPERATIVE PLAN FOR THE PATIENT:  The patient will be discharged home in   stable condition.  I will reevaluate the patient in clinic in approximately 2 weeks for   suture removal and reevaluation of the postoperative plan.  Range of motion will be as tolerated and unrestricted.  Should the patient develop any stiffness, occupational therapy will be recommended and ordered at the 2 week postop visit.

## 2025-07-30 ENCOUNTER — OFFICE VISIT (OUTPATIENT)
Dept: FAMILY MEDICINE | Facility: CLINIC | Age: 80
End: 2025-07-30
Payer: MEDICARE

## 2025-07-30 VITALS
DIASTOLIC BLOOD PRESSURE: 80 MMHG | HEIGHT: 62 IN | WEIGHT: 148.38 LBS | RESPIRATION RATE: 16 BRPM | SYSTOLIC BLOOD PRESSURE: 138 MMHG | HEART RATE: 77 BPM | BODY MASS INDEX: 27.3 KG/M2 | OXYGEN SATURATION: 95 %

## 2025-07-30 DIAGNOSIS — N18.31 HYPERTENSION ASSOCIATED WITH STAGE 3A CHRONIC KIDNEY DISEASE DUE TO TYPE 2 DIABETES MELLITUS: Primary | ICD-10-CM

## 2025-07-30 DIAGNOSIS — Z79.52 IMMUNOSUPPRESSION DUE TO CHRONIC STEROID USE: ICD-10-CM

## 2025-07-30 DIAGNOSIS — D84.821 IMMUNOSUPPRESSION DUE TO CHRONIC STEROID USE: ICD-10-CM

## 2025-07-30 DIAGNOSIS — M54.16 LUMBAR RADICULOPATHY: ICD-10-CM

## 2025-07-30 DIAGNOSIS — E11.22 HYPERTENSION ASSOCIATED WITH STAGE 3A CHRONIC KIDNEY DISEASE DUE TO TYPE 2 DIABETES MELLITUS: Primary | ICD-10-CM

## 2025-07-30 DIAGNOSIS — E87.1 LOW SODIUM LEVELS: ICD-10-CM

## 2025-07-30 DIAGNOSIS — N18.31 ANEMIA IN STAGE 3A CHRONIC KIDNEY DISEASE: ICD-10-CM

## 2025-07-30 DIAGNOSIS — E78.2 COMBINED HYPERLIPIDEMIA ASSOCIATED WITH TYPE 2 DIABETES MELLITUS: Chronic | ICD-10-CM

## 2025-07-30 DIAGNOSIS — E11.69 COMBINED HYPERLIPIDEMIA ASSOCIATED WITH TYPE 2 DIABETES MELLITUS: Chronic | ICD-10-CM

## 2025-07-30 DIAGNOSIS — T38.0X5A IMMUNOSUPPRESSION DUE TO CHRONIC STEROID USE: ICD-10-CM

## 2025-07-30 DIAGNOSIS — D86.9 SARCOIDOSIS: Chronic | ICD-10-CM

## 2025-07-30 DIAGNOSIS — N18.30 STAGE 3 CHRONIC KIDNEY DISEASE, UNSPECIFIED WHETHER STAGE 3A OR 3B CKD: ICD-10-CM

## 2025-07-30 DIAGNOSIS — E87.1 HYPONATREMIA: ICD-10-CM

## 2025-07-30 DIAGNOSIS — I12.9 HYPERTENSION ASSOCIATED WITH STAGE 3A CHRONIC KIDNEY DISEASE DUE TO TYPE 2 DIABETES MELLITUS: Primary | ICD-10-CM

## 2025-07-30 DIAGNOSIS — D63.1 ANEMIA IN STAGE 3A CHRONIC KIDNEY DISEASE: ICD-10-CM

## 2025-07-30 PROCEDURE — 1159F MED LIST DOCD IN RCRD: CPT | Mod: CPTII,HCNC,S$GLB, | Performed by: FAMILY MEDICINE

## 2025-07-30 PROCEDURE — 3079F DIAST BP 80-89 MM HG: CPT | Mod: CPTII,HCNC,S$GLB, | Performed by: FAMILY MEDICINE

## 2025-07-30 PROCEDURE — 3075F SYST BP GE 130 - 139MM HG: CPT | Mod: CPTII,HCNC,S$GLB, | Performed by: FAMILY MEDICINE

## 2025-07-30 PROCEDURE — 1157F ADVNC CARE PLAN IN RCRD: CPT | Mod: CPTII,HCNC,S$GLB, | Performed by: FAMILY MEDICINE

## 2025-07-30 PROCEDURE — 99999 PR PBB SHADOW E&M-EST. PATIENT-LVL V: CPT | Mod: PBBFAC,HCNC,, | Performed by: FAMILY MEDICINE

## 2025-07-30 PROCEDURE — 3288F FALL RISK ASSESSMENT DOCD: CPT | Mod: CPTII,HCNC,S$GLB, | Performed by: FAMILY MEDICINE

## 2025-07-30 PROCEDURE — 1101F PT FALLS ASSESS-DOCD LE1/YR: CPT | Mod: CPTII,HCNC,S$GLB, | Performed by: FAMILY MEDICINE

## 2025-07-30 PROCEDURE — 1125F AMNT PAIN NOTED PAIN PRSNT: CPT | Mod: CPTII,HCNC,S$GLB, | Performed by: FAMILY MEDICINE

## 2025-07-30 PROCEDURE — 99214 OFFICE O/P EST MOD 30 MIN: CPT | Mod: HCNC,S$GLB,, | Performed by: FAMILY MEDICINE

## 2025-07-30 RX ORDER — ATORVASTATIN CALCIUM 20 MG/1
20 TABLET, FILM COATED ORAL DAILY
Qty: 90 TABLET | Refills: 3 | COMMUNITY
Start: 2025-07-30 | End: 2025-07-30

## 2025-07-30 NOTE — PROGRESS NOTES
Chief Complaint   Patient presents with    Follow-up       HPI  Regino Lawrence is a 79 y.o. female with multiple medical diagnoses as listed in the medical history and problem list that presents for follow-up for chronic conditions    Pmhx: CKD 3a, AICD, epilepsy, sarcoidosis on chronic steroids, hx of unprovoked PE on eliquis indefinitely, type 2 DM, HTN, HLD, SARI, renal stone on US in 2024    Had trigger finger surgery for her right hand 7/23 and has had improved range of motion, we discussed concerns about her blood pressure medicine and she reports she takes her medicine as prescribed and does not miss doses    She has labs scheduled Friday, last injection before surgery on 7/18; she has not been in pain since her surgery  Had f/u with urology for kidney stone that had been passed, she is not having any pain today, taking tramadol for post op pain      ALLERGIES AND MEDICATIONS: updated and reviewed.  Review of patient's allergies indicates:   Allergen Reactions    Azathioprine Shortness Of Breath and Other (See Comments)     Fatigue     Medication List with Changes/Refills   Current Medications    ACCU-CHEK FASTCLIX LANCING DEV KIT    USE AS DIRECTED.    ACETAMINOPHEN (TYLENOL) 500 MG TABLET    Take 2 tablets (1,000 mg total) by mouth 2 (two) times a day.    ACETAMINOPHEN (TYLENOL) 650 MG TBSR    Take 1,300 mg by mouth 3 (three) times daily as needed.    APIXABAN (ELIQUIS) 5 MG TAB    Take 1 tablet (5 mg total) by mouth 2 (two) times daily. Start this prescription after finishing starter pack    BLOOD SUGAR DIAGNOSTIC (ACCU-CHEK SMARTVIEW TEST STRIP) STRP    Use as directed to check blood sugar twice daily.    BLOOD SUGAR DIAGNOSTIC STRP    To check BG three times daily, to use with insurance preferred meter    BLOOD-GLUCOSE METER KIT    Use as instructed    CALCIUM CARB/VIT D3/MINERALS (CALCIUM-VITAMIN D ORAL)    Take 1 tablet by mouth once daily.    CARVEDILOL (COREG) 25 MG TABLET    Take 1 tablet (25 mg  "total) by mouth 2 (two) times daily.    CHLORTHALIDONE (HYGROTEN) 25 MG TAB    Take 1 tablet by mouth once daily.    DULOXETINE (CYMBALTA) 60 MG CAPSULE    Take 1 capsule (60 mg total) by mouth once daily.    FLUTICASONE PROPION-SALMETEROL 115-21 MCG/DOSE (ADVAIR HFA) 115-21 MCG/ACTUATION HFAA INHALER    Inhale 2 puffs into the lungs every 12 (twelve) hours. Controller    FOLIC ACID (FOLVITE) 1 MG TABLET    Take 1 tablet (1,000 mcg total) by mouth once daily.    IBUPROFEN (ADVIL,MOTRIN) 600 MG TABLET    Take 1 tablet (600 mg total) by mouth 3 (three) times daily.    INSULIN GLARGINE U-100, LANTUS, (LANTUS SOLOSTAR U-100 INSULIN) 100 UNIT/ML (3 ML) INPN PEN    Inject 30 Units into the skin 2 (two) times a day.    LEVETIRACETAM (KEPPRA) 750 MG TAB    Take 1 tablet (750 mg total) by mouth 2 (two) times daily.    LEVOTHYROXINE (SYNTHROID) 50 MCG TABLET    Take 1 tablet (50 mcg total) by mouth before breakfast.    LINACLOTIDE (LINZESS) 72 MCG CAP CAPSULE    Take 1 capsule (72 mcg total) by mouth before breakfast.    MAGNESIUM 200 MG TAB    1 pill by mouth daily    MEMANTINE (NAMENDA) 5 MG TAB    Take 1 tablet (5 mg total) by mouth 2 (two) times daily.    NIFEDIPINE (PROCARDIA-XL) 60 MG (OSM) 24 HR TABLET    Take 1 tablet (60 mg total) by mouth 2 (two) times a day.    PANTOPRAZOLE (PROTONIX) 40 MG TABLET    Take 1 tablet by mouth once daily    PEN NEEDLE, DIABETIC (BD ULTRA-FINE ANA PEN NEEDLE) 32 GAUGE X 5/32" NDLE    For use with insulin pen    PEPPERMINT OIL (IBGARD ORAL)    Take by mouth. Abdominal distention    POTASSIUM CHLORIDE SA (K-DUR,KLOR-CON M) 10 MEQ TABLET    Take 1 tablet (10 mEq total) by mouth 2 (two) times daily.    PREDNISONE (DELTASONE) 1 MG TABLET    Take 4 tablets (4 mg total) by mouth once daily.    PREGABALIN (LYRICA) 75 MG CAPSULE    Take 1 capsule (75 mg total) by mouth 2 (two) times daily.    TIZANIDINE (ZANAFLEX) 2 MG TABLET    Take 2 tablets (4 mg total) by mouth every 8 (eight) hours as " "needed (muscle spasm).    TRAMADOL (ULTRAM) 50 MG TABLET    Take 1 tablet (50 mg total) by mouth every 6 (six) hours.   Changed and/or Refilled Medications    Modified Medication Previous Medication    ATORVASTATIN (LIPITOR) 20 MG TABLET atorvastatin (LIPITOR) 20 MG tablet       Take 1 tablet (20 mg total) by mouth once daily.    Take 1 tablet (20 mg total) by mouth once daily.       ROS  Review of Systems   Constitutional:  Negative for chills, diaphoresis, fatigue, fever and unexpected weight change.   HENT:  Negative for rhinorrhea, sinus pressure, sore throat and tinnitus.    Eyes:  Negative for photophobia and visual disturbance.   Respiratory:  Negative for cough, shortness of breath and wheezing.    Cardiovascular:  Negative for chest pain and palpitations.   Gastrointestinal:  Negative for abdominal pain, blood in stool, constipation, diarrhea, nausea and vomiting.   Genitourinary:  Negative for dysuria, flank pain, frequency and vaginal discharge.   Musculoskeletal:  Negative for arthralgias and joint swelling.   Skin:  Negative for rash.   Neurological:  Negative for speech difficulty, weakness, light-headedness and headaches.   Psychiatric/Behavioral:  Negative for behavioral problems and dysphoric mood.        Physical Exam  Vitals:    07/30/25 1121 07/30/25 1222   BP: (!) 148/76 138/80   BP Location: Right arm Right arm   Patient Position: Sitting Sitting   Pulse: 100 77   Resp: 16    SpO2: 95%    Weight: 67.3 kg (148 lb 5.9 oz)    Height: 5' 2" (1.575 m)     Body mass index is 27.14 kg/m².  Weight: 67.3 kg (148 lb 5.9 oz)   Height: 5' 2" (157.5 cm)     Physical Exam  Vitals and nursing note reviewed.   Constitutional:       Appearance: She is well-developed.   Skin:     General: Skin is warm and dry.      Findings: No erythema or rash.   Neurological:      Mental Status: She is alert. Mental status is at baseline.   Psychiatric:         Behavior: Behavior normal.           Health maintenance reviewed " and addressed as ordered      ASSESSMENT/PLAN     1. Hypertension associated with stage 3a chronic kidney disease due to type 2 diabetes mellitus  Recheck improved, patient reports she is not having low BP readings and takes medicines as prescribed  Recommend she bring medications with her to her next visit      2. Low sodium levels  Comprehensive Metabolic Panel  Consider medication adjustment pending results      3. Lumbar radiculopathy   Currently not having pain      4. Stage 3 chronic kidney disease, unspecified whether stage 3a or 3b CKD  Continue to monitor with routine labs      5. Sarcoidosis   Continue prednisone prescribed by rheumatologist      6. Immunosuppression due to chronic steroid use  Stable w/o signs of infection      7. Anemia in stage 3a chronic kidney disease  Has f/u with hematology for infusions       8. Hyponatremia  Will monitor with labs, adjust pending results      9. Combined 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  atorvastatin (LIPITOR) 20 MG tablet  DM controlled            Micaela Mendoza MD  07/30/2025 11:56 AM        Follow up in about 3 months (around 10/30/2025).    Orders Placed This Encounter   Procedures    Comprehensive Metabolic Panel

## 2025-08-01 ENCOUNTER — INFUSION (OUTPATIENT)
Dept: INFUSION THERAPY | Facility: HOSPITAL | Age: 80
End: 2025-08-01
Payer: MEDICARE

## 2025-08-01 ENCOUNTER — LAB VISIT (OUTPATIENT)
Dept: LAB | Facility: HOSPITAL | Age: 80
End: 2025-08-01
Attending: INTERNAL MEDICINE
Payer: MEDICARE

## 2025-08-01 VITALS
OXYGEN SATURATION: 95 % | RESPIRATION RATE: 16 BRPM | HEART RATE: 94 BPM | SYSTOLIC BLOOD PRESSURE: 134 MMHG | DIASTOLIC BLOOD PRESSURE: 61 MMHG | TEMPERATURE: 98 F

## 2025-08-01 DIAGNOSIS — D63.1 ANEMIA IN CHRONIC KIDNEY DISEASE, UNSPECIFIED CKD STAGE: ICD-10-CM

## 2025-08-01 DIAGNOSIS — D63.1 ANEMIA IN STAGE 3A CHRONIC KIDNEY DISEASE: ICD-10-CM

## 2025-08-01 DIAGNOSIS — N18.31 ANEMIA IN STAGE 3A CHRONIC KIDNEY DISEASE: ICD-10-CM

## 2025-08-01 DIAGNOSIS — D63.1 ANEMIA IN STAGE 3 CHRONIC KIDNEY DISEASE: Primary | ICD-10-CM

## 2025-08-01 DIAGNOSIS — D63.1 ANEMIA IN STAGE 3 CHRONIC KIDNEY DISEASE: ICD-10-CM

## 2025-08-01 DIAGNOSIS — Z53.1 PROCEDURE AND TREATMENT NOT CARRIED OUT BECAUSE OF PATIENT'S DECISION FOR REASONS OF BELIEF AND GROUP PRESSURE: ICD-10-CM

## 2025-08-01 DIAGNOSIS — N18.9 ANEMIA IN CHRONIC KIDNEY DISEASE, UNSPECIFIED CKD STAGE: ICD-10-CM

## 2025-08-01 DIAGNOSIS — N18.30 ANEMIA IN STAGE 3 CHRONIC KIDNEY DISEASE: Primary | ICD-10-CM

## 2025-08-01 DIAGNOSIS — D50.9 IRON DEFICIENCY ANEMIA, UNSPECIFIED IRON DEFICIENCY ANEMIA TYPE: ICD-10-CM

## 2025-08-01 DIAGNOSIS — N18.30 ANEMIA IN STAGE 3 CHRONIC KIDNEY DISEASE: ICD-10-CM

## 2025-08-01 LAB
ABSOLUTE EOSINOPHIL (OHS): 0.09 K/UL
ABSOLUTE MONOCYTE (OHS): 0.41 K/UL (ref 0.3–1)
ABSOLUTE NEUTROPHIL COUNT (OHS): 2.27 K/UL (ref 1.8–7.7)
BASOPHILS # BLD AUTO: 0.02 K/UL
BASOPHILS NFR BLD AUTO: 0.5 %
ERYTHROCYTE [DISTWIDTH] IN BLOOD BY AUTOMATED COUNT: 13.8 % (ref 11.5–14.5)
FERRITIN SERPL-MCNC: 574.7 NG/ML (ref 20–300)
HCT VFR BLD AUTO: 32.4 % (ref 37–48.5)
HGB BLD-MCNC: 10.6 GM/DL (ref 12–16)
IMM GRANULOCYTES # BLD AUTO: 0.02 K/UL (ref 0–0.04)
IMM GRANULOCYTES NFR BLD AUTO: 0.5 % (ref 0–0.5)
IRON SATN MFR SERPL: 28 % (ref 20–50)
IRON SERPL-MCNC: 93 UG/DL (ref 30–160)
LYMPHOCYTES # BLD AUTO: 0.9 K/UL (ref 1–4.8)
MCH RBC QN AUTO: 33.2 PG (ref 27–31)
MCHC RBC AUTO-ENTMCNC: 32.7 G/DL (ref 32–36)
MCV RBC AUTO: 102 FL (ref 82–98)
NUCLEATED RBC (/100WBC) (OHS): 0 /100 WBC
PLATELET # BLD AUTO: 284 K/UL (ref 150–450)
PMV BLD AUTO: 8.4 FL (ref 9.2–12.9)
RBC # BLD AUTO: 3.19 M/UL (ref 4–5.4)
RELATIVE EOSINOPHIL (OHS): 2.4 %
RELATIVE LYMPHOCYTE (OHS): 24.3 % (ref 18–48)
RELATIVE MONOCYTE (OHS): 11.1 % (ref 4–15)
RELATIVE NEUTROPHIL (OHS): 61.2 % (ref 38–73)
TIBC SERPL-MCNC: 334 UG/DL (ref 250–450)
TRANSFERRIN SERPL-MCNC: 226 MG/DL (ref 200–375)
WBC # BLD AUTO: 3.71 K/UL (ref 3.9–12.7)

## 2025-08-01 PROCEDURE — 83540 ASSAY OF IRON: CPT | Mod: HCNC

## 2025-08-01 PROCEDURE — 96372 THER/PROPH/DIAG INJ SC/IM: CPT | Mod: HCNC

## 2025-08-01 PROCEDURE — 85025 COMPLETE CBC W/AUTO DIFF WBC: CPT | Mod: HCNC

## 2025-08-01 PROCEDURE — 36415 COLL VENOUS BLD VENIPUNCTURE: CPT | Mod: HCNC

## 2025-08-01 PROCEDURE — 63600175 PHARM REV CODE 636 W HCPCS: Mod: JZ,EC,TB,HCNC | Performed by: INTERNAL MEDICINE

## 2025-08-01 PROCEDURE — 82728 ASSAY OF FERRITIN: CPT | Mod: HCNC

## 2025-08-01 RX ADMIN — EPOETIN ALFA-EPBX 40000 UNITS: 40000 INJECTION, SOLUTION INTRAVENOUS; SUBCUTANEOUS at 11:08

## 2025-08-01 NOTE — PLAN OF CARE
Patient wheeled onto unit for q2w retacrit 40k injection, VSS, no s/s of distress. Hgb 10.6. Retacrit 40k administered sub q via left arm. Patient tolerated treatment well. Appt calendar printed for patient. Patient wheeled off unit, no s/s of distress.

## 2025-08-04 DIAGNOSIS — I10 ESSENTIAL HYPERTENSION: Chronic | ICD-10-CM

## 2025-08-04 NOTE — TELEPHONE ENCOUNTER
No care due was identified.  [facility] Embedded Care Due Messages. Reference number: [id number].   8/04/2025 9:18:00 AM CDT

## 2025-08-05 ENCOUNTER — PATIENT MESSAGE (OUTPATIENT)
Facility: CLINIC | Age: 80
End: 2025-08-05
Payer: MEDICARE

## 2025-08-05 RX ORDER — CARVEDILOL 25 MG/1
25 TABLET ORAL 2 TIMES DAILY
Qty: 180 TABLET | Refills: 3 | Status: SHIPPED | OUTPATIENT
Start: 2025-08-05

## 2025-08-05 NOTE — TELEPHONE ENCOUNTER
Refill Decision Note   Regino Lawrence  is requesting a refill authorization.  Brief Assessment and Rationale for Refill:  Approve     Medication Therapy Plan:         Pharmacist review requested: Yes   Comments:     Note composed:7:29 AM 08/05/2025

## 2025-08-05 NOTE — TELEPHONE ENCOUNTER
Refill Routing Note   Medication(s) are not appropriate for processing by [facility] for the following reason(s):        Drug-disease interaction    [facility] action(s):  Defer      Medication Therapy Plan: Drug-Disease: carvediloL and Chronic obstructive pulmonary disease, unspecified COPD type    Pharmacist review requested: Yes     Appointments  past 12m or future 3m with PCP    Date Provider   Last Visit   7/30/2025 Micaela Mendoza MD   Next Visit   11/10/2025 Micaela Mendoza MD   ED visits in past 90 days: 0        Note composed:10:58 PM 08/04/2025

## 2025-08-06 ENCOUNTER — OFFICE VISIT (OUTPATIENT)
Facility: CLINIC | Age: 80
End: 2025-08-06
Payer: MEDICARE

## 2025-08-06 VITALS — HEIGHT: 62 IN | BODY MASS INDEX: 27.3 KG/M2 | WEIGHT: 148.38 LBS

## 2025-08-06 DIAGNOSIS — M65.332 TRIGGER FINGER OF ALL DIGITS OF LEFT HAND: ICD-10-CM

## 2025-08-06 DIAGNOSIS — M65.312 TRIGGER FINGER OF ALL DIGITS OF LEFT HAND: ICD-10-CM

## 2025-08-06 DIAGNOSIS — M65.342 TRIGGER FINGER OF ALL DIGITS OF LEFT HAND: ICD-10-CM

## 2025-08-06 DIAGNOSIS — Z98.890 POST-OPERATIVE STATE: Primary | ICD-10-CM

## 2025-08-06 DIAGNOSIS — M65.352 TRIGGER FINGER OF ALL DIGITS OF LEFT HAND: ICD-10-CM

## 2025-08-06 DIAGNOSIS — M79.642 PAIN OF LEFT HAND: ICD-10-CM

## 2025-08-06 DIAGNOSIS — M65.322 TRIGGER FINGER OF ALL DIGITS OF LEFT HAND: ICD-10-CM

## 2025-08-06 PROCEDURE — 99999 PR PBB SHADOW E&M-EST. PATIENT-LVL IV: CPT | Mod: PBBFAC,HCNC,,

## 2025-08-06 RX ORDER — TRAMADOL HYDROCHLORIDE 50 MG/1
50 TABLET, FILM COATED ORAL EVERY 8 HOURS PRN
Qty: 20 TABLET | Refills: 0 | Status: SHIPPED | OUTPATIENT
Start: 2025-08-06

## 2025-08-06 NOTE — PROGRESS NOTES
Dr. Dunham is the supervising physician for this encounter/patient    Regino Lawrence presents for post-operative evaluation.  The patient is now 2 weeks s/p left hand complete trigger finger release of every digit with Dr. Dunham on 07/23/2025.  Overall the patient reports significant pain, and she rates her pain a 10/10 today.  The patient reports she is taking Tylenol and tramadol as needed for postoperative pain control.  She does note she has ran out of the tramadol, and she is requesting a refill of this medication today.  She admits to improving range of motion, but she does note active flexion of the left hand is painful and stiff.  However, she denies vivek triggering to the left hand at this time.  She further denies fevers, chills, night sweats, drainage, erythema, and warmth from the wound.  She presents today for initial postoperative evaluation of her left hand with no further complaints.    PE:  AA&O x 4.  NAD  HEENT:  NCAT, sclera nonicteric  Lungs:  Respirations are equal and unlabored.  CV:  2+ bilateral upper and lower extremity pulses.  MSK: The wound is healing well with no signs of erythema or warmth.  There is no drainage.  No clinical signs or symptoms of infection are present.  Nylon sutures discontinued today.  Steri-Strips applied.  The patient is able to actively flex the distal fingertips 8 cm from the palm of the hand.  No vivek clicking or locking noted on exam today to any of the digits of the left hand.  She is significantly tender to the incision sites.  She is neurovascularly intact to left upper extremity.    A/P: Status post above, doing well  1) Continue with weight bearing as tolerated to left upper extremity.  Refill tramadol 50 mg tablets sent to patient's pharmacy today quantity of 21.   reviewed.  The patient is aware she is not to drive on narcotic medications.  2) Continue active and passive range of motion exercises as tolerated to left hand.  Referral sent to  occupational therapy today for initial treatment and evaluation of the left hand.  3) All sutures removed today. Wound care and signs of infection discussed.  She may begin gentle scar massage with Mederma, vitamin-E oil or cocoa butter once steri strips fall off.  4) F/U 4 weeks for range-of-motion re-evaluation or sooner for any problems.  5) Call with any questions/concerns in the interim    Disclaimer:  This note is prepared using voice recognition software and as such is likely to have errors and has not been proof read. Please contact me for questions.     Imani Robbins PA-C  Orthopedic/Hand Surgery

## 2025-08-08 ENCOUNTER — OFFICE VISIT (OUTPATIENT)
Dept: HEMATOLOGY/ONCOLOGY | Facility: CLINIC | Age: 80
End: 2025-08-08
Payer: MEDICARE

## 2025-08-08 VITALS
DIASTOLIC BLOOD PRESSURE: 72 MMHG | HEIGHT: 62 IN | BODY MASS INDEX: 29.66 KG/M2 | HEART RATE: 112 BPM | SYSTOLIC BLOOD PRESSURE: 156 MMHG | RESPIRATION RATE: 20 BRPM | OXYGEN SATURATION: 98 % | WEIGHT: 161.19 LBS

## 2025-08-08 DIAGNOSIS — N18.9 CHRONIC KIDNEY DISEASE, UNSPECIFIED CKD STAGE: ICD-10-CM

## 2025-08-08 DIAGNOSIS — N18.9 ANEMIA IN CHRONIC KIDNEY DISEASE, UNSPECIFIED CKD STAGE: Primary | ICD-10-CM

## 2025-08-08 DIAGNOSIS — D86.9 SARCOIDOSIS: ICD-10-CM

## 2025-08-08 DIAGNOSIS — Z86.711 HISTORY OF PULMONARY EMBOLISM: ICD-10-CM

## 2025-08-08 DIAGNOSIS — Z79.01 CHRONIC ANTICOAGULATION: ICD-10-CM

## 2025-08-08 DIAGNOSIS — D63.1 ANEMIA IN CHRONIC KIDNEY DISEASE, UNSPECIFIED CKD STAGE: Primary | ICD-10-CM

## 2025-08-08 PROCEDURE — 1101F PT FALLS ASSESS-DOCD LE1/YR: CPT | Mod: CPTII,HCNC,S$GLB, | Performed by: INTERNAL MEDICINE

## 2025-08-08 PROCEDURE — 99999 PR PBB SHADOW E&M-EST. PATIENT-LVL V: CPT | Mod: PBBFAC,HCNC,, | Performed by: INTERNAL MEDICINE

## 2025-08-08 PROCEDURE — 3288F FALL RISK ASSESSMENT DOCD: CPT | Mod: CPTII,HCNC,S$GLB, | Performed by: INTERNAL MEDICINE

## 2025-08-08 PROCEDURE — 3078F DIAST BP <80 MM HG: CPT | Mod: CPTII,HCNC,S$GLB, | Performed by: INTERNAL MEDICINE

## 2025-08-08 PROCEDURE — 1157F ADVNC CARE PLAN IN RCRD: CPT | Mod: CPTII,HCNC,S$GLB, | Performed by: INTERNAL MEDICINE

## 2025-08-08 PROCEDURE — 3077F SYST BP >= 140 MM HG: CPT | Mod: CPTII,HCNC,S$GLB, | Performed by: INTERNAL MEDICINE

## 2025-08-08 PROCEDURE — 1126F AMNT PAIN NOTED NONE PRSNT: CPT | Mod: CPTII,HCNC,S$GLB, | Performed by: INTERNAL MEDICINE

## 2025-08-08 PROCEDURE — 99214 OFFICE O/P EST MOD 30 MIN: CPT | Mod: HCNC,S$GLB,, | Performed by: INTERNAL MEDICINE

## 2025-08-12 ENCOUNTER — CLINICAL SUPPORT (OUTPATIENT)
Dept: REHABILITATION | Facility: HOSPITAL | Age: 80
End: 2025-08-12
Payer: MEDICARE

## 2025-08-12 DIAGNOSIS — M25.642 DECREASED RANGE OF MOTION OF FINGER OF LEFT HAND: ICD-10-CM

## 2025-08-12 DIAGNOSIS — M65.322 TRIGGER FINGER OF ALL DIGITS OF LEFT HAND: ICD-10-CM

## 2025-08-12 DIAGNOSIS — M65.352 TRIGGER FINGER OF ALL DIGITS OF LEFT HAND: ICD-10-CM

## 2025-08-12 DIAGNOSIS — M65.342 TRIGGER FINGER OF ALL DIGITS OF LEFT HAND: ICD-10-CM

## 2025-08-12 DIAGNOSIS — M79.642 PAIN OF LEFT HAND: Primary | ICD-10-CM

## 2025-08-12 DIAGNOSIS — M65.332 TRIGGER FINGER OF ALL DIGITS OF LEFT HAND: ICD-10-CM

## 2025-08-12 DIAGNOSIS — M65.312 TRIGGER FINGER OF ALL DIGITS OF LEFT HAND: ICD-10-CM

## 2025-08-12 PROCEDURE — 97165 OT EVAL LOW COMPLEX 30 MIN: CPT

## 2025-08-12 PROCEDURE — 97110 THERAPEUTIC EXERCISES: CPT

## 2025-08-15 ENCOUNTER — LAB VISIT (OUTPATIENT)
Dept: LAB | Facility: HOSPITAL | Age: 80
End: 2025-08-15
Attending: INTERNAL MEDICINE
Payer: MEDICARE

## 2025-08-15 ENCOUNTER — INFUSION (OUTPATIENT)
Dept: INFUSION THERAPY | Facility: HOSPITAL | Age: 80
End: 2025-08-15
Attending: INTERNAL MEDICINE
Payer: MEDICARE

## 2025-08-15 VITALS
HEART RATE: 92 BPM | TEMPERATURE: 99 F | RESPIRATION RATE: 16 BRPM | SYSTOLIC BLOOD PRESSURE: 132 MMHG | DIASTOLIC BLOOD PRESSURE: 62 MMHG | OXYGEN SATURATION: 98 %

## 2025-08-15 DIAGNOSIS — N18.31 ANEMIA IN STAGE 3A CHRONIC KIDNEY DISEASE: ICD-10-CM

## 2025-08-15 DIAGNOSIS — D63.1 ANEMIA IN STAGE 3A CHRONIC KIDNEY DISEASE: ICD-10-CM

## 2025-08-15 DIAGNOSIS — N18.30 ANEMIA IN STAGE 3 CHRONIC KIDNEY DISEASE: Primary | ICD-10-CM

## 2025-08-15 DIAGNOSIS — N18.9 ANEMIA IN CHRONIC KIDNEY DISEASE, UNSPECIFIED CKD STAGE: ICD-10-CM

## 2025-08-15 DIAGNOSIS — D63.1 ANEMIA IN CHRONIC KIDNEY DISEASE, UNSPECIFIED CKD STAGE: ICD-10-CM

## 2025-08-15 DIAGNOSIS — Z53.1 PROCEDURE AND TREATMENT NOT CARRIED OUT BECAUSE OF PATIENT'S DECISION FOR REASONS OF BELIEF AND GROUP PRESSURE: ICD-10-CM

## 2025-08-15 DIAGNOSIS — D50.9 IRON DEFICIENCY ANEMIA, UNSPECIFIED IRON DEFICIENCY ANEMIA TYPE: ICD-10-CM

## 2025-08-15 DIAGNOSIS — D63.1 ANEMIA IN STAGE 3 CHRONIC KIDNEY DISEASE: Primary | ICD-10-CM

## 2025-08-15 PROBLEM — M25.642 DECREASED RANGE OF MOTION OF FINGER OF LEFT HAND: Status: ACTIVE | Noted: 2025-08-15

## 2025-08-15 PROBLEM — M79.642 PAIN OF LEFT HAND: Status: ACTIVE | Noted: 2025-08-15

## 2025-08-15 LAB
ABSOLUTE EOSINOPHIL (OHS): 0.05 K/UL
ABSOLUTE MONOCYTE (OHS): 0.53 K/UL (ref 0.3–1)
ABSOLUTE NEUTROPHIL COUNT (OHS): 2.5 K/UL (ref 1.8–7.7)
BASOPHILS # BLD AUTO: 0.01 K/UL
BASOPHILS NFR BLD AUTO: 0.2 %
ERYTHROCYTE [DISTWIDTH] IN BLOOD BY AUTOMATED COUNT: 13.7 % (ref 11.5–14.5)
HCT VFR BLD AUTO: 31.2 % (ref 37–48.5)
HGB BLD-MCNC: 10.5 GM/DL (ref 12–16)
IMM GRANULOCYTES # BLD AUTO: 0.04 K/UL (ref 0–0.04)
IMM GRANULOCYTES NFR BLD AUTO: 1 % (ref 0–0.5)
LYMPHOCYTES # BLD AUTO: 1.01 K/UL (ref 1–4.8)
MCH RBC QN AUTO: 34.1 PG (ref 27–31)
MCHC RBC AUTO-ENTMCNC: 33.7 G/DL (ref 32–36)
MCV RBC AUTO: 101 FL (ref 82–98)
NUCLEATED RBC (/100WBC) (OHS): 0 /100 WBC
PLATELET # BLD AUTO: 264 K/UL (ref 150–450)
PMV BLD AUTO: 8.7 FL (ref 9.2–12.9)
RBC # BLD AUTO: 3.08 M/UL (ref 4–5.4)
RELATIVE EOSINOPHIL (OHS): 1.2 %
RELATIVE LYMPHOCYTE (OHS): 24.4 % (ref 18–48)
RELATIVE MONOCYTE (OHS): 12.8 % (ref 4–15)
RELATIVE NEUTROPHIL (OHS): 60.4 % (ref 38–73)
WBC # BLD AUTO: 4.14 K/UL (ref 3.9–12.7)

## 2025-08-15 PROCEDURE — 85025 COMPLETE CBC W/AUTO DIFF WBC: CPT | Mod: HCNC

## 2025-08-15 PROCEDURE — 63600175 PHARM REV CODE 636 W HCPCS: Mod: JZ,EC,TB,HCNC | Performed by: INTERNAL MEDICINE

## 2025-08-15 PROCEDURE — 96372 THER/PROPH/DIAG INJ SC/IM: CPT | Mod: HCNC

## 2025-08-15 PROCEDURE — 36415 COLL VENOUS BLD VENIPUNCTURE: CPT | Mod: HCNC

## 2025-08-15 RX ADMIN — EPOETIN ALFA-EPBX 40000 UNITS: 40000 INJECTION, SOLUTION INTRAVENOUS; SUBCUTANEOUS at 11:08

## 2025-08-19 ENCOUNTER — PATIENT MESSAGE (OUTPATIENT)
Dept: FAMILY MEDICINE | Facility: CLINIC | Age: 80
End: 2025-08-19
Payer: MEDICARE

## 2025-08-21 ENCOUNTER — CLINICAL SUPPORT (OUTPATIENT)
Dept: REHABILITATION | Facility: HOSPITAL | Age: 80
End: 2025-08-21
Payer: MEDICARE

## 2025-08-21 DIAGNOSIS — M25.642 DECREASED RANGE OF MOTION OF FINGER OF LEFT HAND: ICD-10-CM

## 2025-08-21 DIAGNOSIS — M79.642 PAIN OF LEFT HAND: Primary | ICD-10-CM

## 2025-08-21 PROCEDURE — 97530 THERAPEUTIC ACTIVITIES: CPT | Mod: CO

## 2025-08-21 PROCEDURE — 97110 THERAPEUTIC EXERCISES: CPT | Mod: CO

## 2025-08-22 ENCOUNTER — OFFICE VISIT (OUTPATIENT)
Dept: FAMILY MEDICINE | Facility: CLINIC | Age: 80
End: 2025-08-22
Payer: MEDICARE

## 2025-08-22 ENCOUNTER — HOSPITAL ENCOUNTER (EMERGENCY)
Facility: HOSPITAL | Age: 80
Discharge: HOME | End: 2025-08-22
Attending: EMERGENCY MEDICINE
Payer: MEDICARE

## 2025-08-22 ENCOUNTER — OCHSNER VIRTUAL EMERGENCY DEPARTMENT (OUTPATIENT)
Facility: CLINIC | Age: 80
End: 2025-08-22
Payer: MEDICARE

## 2025-08-22 ENCOUNTER — PATIENT OUTREACH (OUTPATIENT)
Facility: OTHER | Age: 80
End: 2025-08-22
Payer: MEDICARE

## 2025-08-22 VITALS
OXYGEN SATURATION: 97 % | SYSTOLIC BLOOD PRESSURE: 149 MMHG | HEIGHT: 62 IN | DIASTOLIC BLOOD PRESSURE: 83 MMHG | WEIGHT: 160 LBS | RESPIRATION RATE: 15 BRPM | TEMPERATURE: 98 F | BODY MASS INDEX: 29.44 KG/M2 | HEART RATE: 96 BPM

## 2025-08-22 VITALS
DIASTOLIC BLOOD PRESSURE: 62 MMHG | OXYGEN SATURATION: 95 % | HEART RATE: 106 BPM | WEIGHT: 157.19 LBS | BODY MASS INDEX: 28.93 KG/M2 | HEIGHT: 62 IN | SYSTOLIC BLOOD PRESSURE: 122 MMHG

## 2025-08-22 DIAGNOSIS — R06.02 SOB (SHORTNESS OF BREATH): ICD-10-CM

## 2025-08-22 DIAGNOSIS — Z86.711 HISTORY OF PULMONARY EMBOLISM: ICD-10-CM

## 2025-08-22 DIAGNOSIS — E87.1 HYPONATREMIA: Primary | ICD-10-CM

## 2025-08-22 DIAGNOSIS — R07.89 CHEST DISCOMFORT: ICD-10-CM

## 2025-08-22 DIAGNOSIS — I27.20 PULMONARY HYPERTENSION: ICD-10-CM

## 2025-08-22 DIAGNOSIS — R06.02 SHORTNESS OF BREATH: Primary | ICD-10-CM

## 2025-08-22 DIAGNOSIS — E87.6 HYPOKALEMIA: ICD-10-CM

## 2025-08-22 DIAGNOSIS — R10.9 RIGHT FLANK PAIN: ICD-10-CM

## 2025-08-22 DIAGNOSIS — R06.09 DOE (DYSPNEA ON EXERTION): Primary | ICD-10-CM

## 2025-08-22 DIAGNOSIS — D86.9 SARCOIDOSIS: ICD-10-CM

## 2025-08-22 DIAGNOSIS — M54.16 LUMBAR RADICULOPATHY: ICD-10-CM

## 2025-08-22 LAB
ABSOLUTE EOSINOPHIL (OHS): 0.07 K/UL
ABSOLUTE MONOCYTE (OHS): 0.83 K/UL (ref 0.3–1)
ABSOLUTE NEUTROPHIL COUNT (OHS): 5.01 K/UL (ref 1.8–7.7)
ALBUMIN SERPL BCP-MCNC: 4 G/DL (ref 3.5–5.2)
ALP SERPL-CCNC: 100 UNIT/L (ref 40–150)
ALT SERPL W/O P-5'-P-CCNC: 21 UNIT/L (ref 10–44)
ANION GAP (OHS): 15 MMOL/L (ref 8–16)
AST SERPL-CCNC: 30 UNIT/L (ref 11–45)
BACTERIA #/AREA URNS AUTO: NORMAL /HPF
BASOPHILS # BLD AUTO: 0.02 K/UL
BASOPHILS NFR BLD AUTO: 0.3 %
BILIRUB SERPL-MCNC: 0.4 MG/DL (ref 0.1–1)
BILIRUB UR QL STRIP.AUTO: NEGATIVE
BUN SERPL-MCNC: 18 MG/DL (ref 8–23)
CALCIUM SERPL-MCNC: 9.7 MG/DL (ref 8.7–10.5)
CHLORIDE SERPL-SCNC: 90 MMOL/L (ref 95–110)
CLARITY UR: CLEAR
CO2 SERPL-SCNC: 24 MMOL/L (ref 23–29)
COLOR UR AUTO: ABNORMAL
CREAT SERPL-MCNC: 1.1 MG/DL (ref 0.5–1.4)
CTP QC/QA: YES
CTP QC/QA: YES
D DIMER PPP IA.FEU-MCNC: 0.3 MG/L FEU
ERYTHROCYTE [DISTWIDTH] IN BLOOD BY AUTOMATED COUNT: 14.2 % (ref 11.5–14.5)
GFR SERPLBLD CREATININE-BSD FMLA CKD-EPI: 51 ML/MIN/1.73/M2
GLUCOSE SERPL-MCNC: 153 MG/DL (ref 70–110)
GLUCOSE UR QL STRIP: NEGATIVE
HCT VFR BLD AUTO: 38.8 % (ref 37–48.5)
HGB BLD-MCNC: 12.8 GM/DL (ref 12–16)
HGB UR QL STRIP: NEGATIVE
IMM GRANULOCYTES # BLD AUTO: 0.06 K/UL (ref 0–0.04)
IMM GRANULOCYTES NFR BLD AUTO: 0.9 % (ref 0–0.5)
KETONES UR QL STRIP: NEGATIVE
LEUKOCYTE ESTERASE UR QL STRIP: ABNORMAL
LYMPHOCYTES # BLD AUTO: 0.82 K/UL (ref 1–4.8)
MCH RBC QN AUTO: 32.7 PG (ref 27–31)
MCHC RBC AUTO-ENTMCNC: 33 G/DL (ref 32–36)
MCV RBC AUTO: 99 FL (ref 82–98)
MICROSCOPIC COMMENT: NORMAL
NITRITE UR QL STRIP: NEGATIVE
NT-PROBNP SERPL-MCNC: 61 PG/ML
NUCLEATED RBC (/100WBC) (OHS): 0 /100 WBC
PH UR STRIP: 7 [PH]
PLATELET # BLD AUTO: 381 K/UL (ref 150–450)
PMV BLD AUTO: 8.6 FL (ref 9.2–12.9)
POC MOLECULAR INFLUENZA A AGN: NEGATIVE
POC MOLECULAR INFLUENZA B AGN: NEGATIVE
POTASSIUM SERPL-SCNC: 3.3 MMOL/L (ref 3.5–5.1)
PROT SERPL-MCNC: 7.6 GM/DL (ref 6–8.4)
PROT UR QL STRIP: NEGATIVE
RBC # BLD AUTO: 3.91 M/UL (ref 4–5.4)
RBC #/AREA URNS AUTO: 3 /HPF (ref 0–4)
RELATIVE EOSINOPHIL (OHS): 1 %
RELATIVE LYMPHOCYTE (OHS): 12 % (ref 18–48)
RELATIVE MONOCYTE (OHS): 12.2 % (ref 4–15)
RELATIVE NEUTROPHIL (OHS): 73.6 % (ref 38–73)
SARS-COV-2 RDRP RESP QL NAA+PROBE: NEGATIVE
SODIUM SERPL-SCNC: 129 MMOL/L (ref 136–145)
SP GR UR STRIP: 1.01
SQUAMOUS #/AREA URNS AUTO: 1 /HPF
TROPONIN I SERPL HS-MCNC: <3 NG/L
UROBILINOGEN UR STRIP-ACNC: NEGATIVE EU/DL
WBC # BLD AUTO: 6.81 K/UL (ref 3.9–12.7)
WBC #/AREA URNS AUTO: 4 /HPF (ref 0–5)

## 2025-08-22 PROCEDURE — 63600175 PHARM REV CODE 636 W HCPCS: Mod: HCNC | Performed by: EMERGENCY MEDICINE

## 2025-08-22 PROCEDURE — 96361 HYDRATE IV INFUSION ADD-ON: CPT | Mod: HCNC

## 2025-08-22 PROCEDURE — 99285 EMERGENCY DEPT VISIT HI MDM: CPT | Mod: 25,HCNC

## 2025-08-22 PROCEDURE — 81003 URINALYSIS AUTO W/O SCOPE: CPT | Mod: HCNC

## 2025-08-22 PROCEDURE — 85379 FIBRIN DEGRADATION QUANT: CPT | Mod: HCNC | Performed by: EMERGENCY MEDICINE

## 2025-08-22 PROCEDURE — 85025 COMPLETE CBC W/AUTO DIFF WBC: CPT | Mod: HCNC

## 2025-08-22 PROCEDURE — 87502 INFLUENZA DNA AMP PROBE: CPT | Mod: HCNC

## 2025-08-22 PROCEDURE — 25000003 PHARM REV CODE 250: Mod: HCNC | Performed by: EMERGENCY MEDICINE

## 2025-08-22 PROCEDURE — 84484 ASSAY OF TROPONIN QUANT: CPT | Mod: HCNC

## 2025-08-22 PROCEDURE — 83880 ASSAY OF NATRIURETIC PEPTIDE: CPT | Mod: HCNC

## 2025-08-22 PROCEDURE — 80053 COMPREHEN METABOLIC PANEL: CPT | Mod: HCNC | Performed by: EMERGENCY MEDICINE

## 2025-08-22 PROCEDURE — 96374 THER/PROPH/DIAG INJ IV PUSH: CPT | Mod: HCNC

## 2025-08-22 PROCEDURE — 99999 PR PBB SHADOW E&M-EST. PATIENT-LVL III: CPT | Mod: PBBFAC,HCNC,,

## 2025-08-22 PROCEDURE — 93005 ELECTROCARDIOGRAM TRACING: CPT | Mod: HCNC

## 2025-08-22 PROCEDURE — 87635 SARS-COV-2 COVID-19 AMP PRB: CPT | Mod: HCNC | Performed by: EMERGENCY MEDICINE

## 2025-08-22 RX ORDER — LIDOCAINE 50 MG/G
1 PATCH TOPICAL DAILY
Qty: 14 PATCH | Refills: 0 | Status: SHIPPED | OUTPATIENT
Start: 2025-08-22

## 2025-08-22 RX ORDER — ONDANSETRON HYDROCHLORIDE 2 MG/ML
4 INJECTION, SOLUTION INTRAVENOUS
Status: COMPLETED | OUTPATIENT
Start: 2025-08-22 | End: 2025-08-22

## 2025-08-22 RX ADMIN — POTASSIUM BICARBONATE 20 MEQ: 391 TABLET, EFFERVESCENT ORAL at 04:08

## 2025-08-22 RX ADMIN — ONDANSETRON 4 MG: 2 INJECTION INTRAMUSCULAR; INTRAVENOUS at 02:08

## 2025-08-22 RX ADMIN — SODIUM CHLORIDE, POTASSIUM CHLORIDE, SODIUM LACTATE AND CALCIUM CHLORIDE 1000 ML: 600; 310; 30; 20 INJECTION, SOLUTION INTRAVENOUS at 04:08

## 2025-08-23 LAB
HOLD SPECIMEN: NORMAL
OHS QRS DURATION: 72 MS
OHS QTC CALCULATION: 399 MS

## 2025-08-24 LAB
OHS QRS DURATION: 70 MS
OHS QTC CALCULATION: 476 MS

## 2025-08-29 ENCOUNTER — HOSPITAL ENCOUNTER (OUTPATIENT)
Facility: HOSPITAL | Age: 80
Discharge: HOME | End: 2025-08-29
Attending: EMERGENCY MEDICINE | Admitting: HOSPITALIST
Payer: MEDICARE

## 2025-08-29 ENCOUNTER — TELEPHONE (OUTPATIENT)
Facility: CLINIC | Age: 80
End: 2025-08-29
Payer: MEDICARE

## 2025-08-29 VITALS
WEIGHT: 160 LBS | TEMPERATURE: 98 F | BODY MASS INDEX: 29.44 KG/M2 | DIASTOLIC BLOOD PRESSURE: 85 MMHG | HEART RATE: 93 BPM | OXYGEN SATURATION: 99 % | SYSTOLIC BLOOD PRESSURE: 169 MMHG | RESPIRATION RATE: 16 BRPM | HEIGHT: 62 IN

## 2025-08-29 DIAGNOSIS — N17.9 AKI (ACUTE KIDNEY INJURY): Primary | ICD-10-CM

## 2025-08-29 DIAGNOSIS — E87.6 HYPOKALEMIA: ICD-10-CM

## 2025-08-29 DIAGNOSIS — Z13.6 SCREENING FOR CARDIOVASCULAR CONDITION: ICD-10-CM

## 2025-08-29 DIAGNOSIS — E83.42 HYPOMAGNESEMIA: ICD-10-CM

## 2025-08-29 DIAGNOSIS — R00.0 TACHYCARDIA: ICD-10-CM

## 2025-08-29 DIAGNOSIS — R53.1 WEAKNESS: ICD-10-CM

## 2025-08-29 LAB
ABSOLUTE EOSINOPHIL (OHS): 0.06 K/UL
ABSOLUTE MONOCYTE (OHS): 0.61 K/UL (ref 0.3–1)
ABSOLUTE NEUTROPHIL COUNT (OHS): 3.29 K/UL (ref 1.8–7.7)
ALBUMIN SERPL BCP-MCNC: 3.3 G/DL (ref 3.5–5.2)
ALP SERPL-CCNC: 86 UNIT/L (ref 40–150)
ALT SERPL W/O P-5'-P-CCNC: 21 UNIT/L (ref 0–55)
ANION GAP (OHS): 13 MMOL/L (ref 8–16)
ANION GAP (OHS): 14 MMOL/L (ref 8–16)
AST SERPL-CCNC: 23 UNIT/L (ref 0–50)
BASOPHILS # BLD AUTO: 0.02 K/UL
BASOPHILS NFR BLD AUTO: 0.4 %
BILIRUB SERPL-MCNC: 0.3 MG/DL (ref 0.1–1)
BILIRUB UR QL STRIP.AUTO: NEGATIVE
BIPAP: 0
BUN SERPL-MCNC: 22 MG/DL (ref 8–23)
BUN SERPL-MCNC: 24 MG/DL (ref 8–23)
CALCIUM SERPL-MCNC: 8 MG/DL (ref 8.7–10.5)
CALCIUM SERPL-MCNC: 8.3 MG/DL (ref 8.7–10.5)
CHLORIDE SERPL-SCNC: 93 MMOL/L (ref 95–110)
CHLORIDE SERPL-SCNC: 94 MMOL/L (ref 95–110)
CLARITY UR: CLEAR
CO2 SERPL-SCNC: 20 MMOL/L (ref 23–29)
CO2 SERPL-SCNC: 21 MMOL/L (ref 23–29)
COLOR UR AUTO: YELLOW
CORRECTED TEMPERATURE (PCO2): 36.4 MMHG
CORRECTED TEMPERATURE (PH): 7.46
CORRECTED TEMPERATURE (PO2): 57.5 MMHG
CREAT SERPL-MCNC: 1.1 MG/DL (ref 0.5–1.4)
CREAT SERPL-MCNC: 1.4 MG/DL (ref 0.5–1.4)
ERYTHROCYTE [DISTWIDTH] IN BLOOD BY AUTOMATED COUNT: 13.2 % (ref 11.5–14.5)
FIO2: 21 %
GFR SERPLBLD CREATININE-BSD FMLA CKD-EPI: 38 ML/MIN/1.73/M2
GFR SERPLBLD CREATININE-BSD FMLA CKD-EPI: 51 ML/MIN/1.73/M2
GLUCOSE SERPL-MCNC: 161 MG/DL (ref 70–110)
GLUCOSE SERPL-MCNC: 165 MG/DL (ref 70–110)
GLUCOSE UR QL STRIP: NEGATIVE
HCT VFR BLD AUTO: 29.6 % (ref 37–48.5)
HCT VFR BLD CALC: 32.5 % (ref 36–54)
HCV AB SERPL QL IA: NORMAL
HGB BLD-MCNC: 10.3 GM/DL (ref 12–16)
HGB UR QL STRIP: NEGATIVE
IMM GRANULOCYTES # BLD AUTO: 0.06 K/UL (ref 0–0.04)
IMM GRANULOCYTES NFR BLD AUTO: 1.2 % (ref 0–0.5)
KETONES UR QL STRIP: NEGATIVE
LDH SERPL L TO P-CCNC: 1.9 MMOL/L (ref 0.5–2.2)
LEUKOCYTE ESTERASE UR QL STRIP: NEGATIVE
LYMPHOCYTES # BLD AUTO: 0.99 K/UL (ref 1–4.8)
MAGNESIUM SERPL-MCNC: 1 MG/DL (ref 1.6–2.6)
MAGNESIUM SERPL-MCNC: 2 MG/DL (ref 1.6–2.6)
MCH RBC QN AUTO: 33.4 PG (ref 27–31)
MCHC RBC AUTO-ENTMCNC: 34.8 G/DL (ref 32–36)
MCV RBC AUTO: 96 FL (ref 82–98)
NITRITE UR QL STRIP: NEGATIVE
NT-PROBNP SERPL-MCNC: 42 PG/ML
NUCLEATED RBC (/100WBC) (OHS): 0 /100 WBC
OHS QRS DURATION: 72 MS
OHS QTC CALCULATION: 493 MS
PCO2 BLDA: 36.4 MMHG (ref 35–45)
PH SMN: 7.46 [PH] (ref 7.35–7.45)
PH UR STRIP: 7 [PH]
PHOSPHATE SERPL-MCNC: 3.3 MG/DL (ref 2.7–4.5)
PLATELET # BLD AUTO: 237 K/UL (ref 150–450)
PLATELET BLD QL SMEAR: NORMAL
PMV BLD AUTO: 9.2 FL (ref 9.2–12.9)
PO2 BLDA: 57.5 MMHG (ref 40–60)
POC BASE DEFICIT: 1.9 MMOL/L
POC HCO3: 25.9 MMOL/L (ref 24–28)
POC PERFORMED BY: ABNORMAL
POC TEMPERATURE: 37 C
POTASSIUM SERPL-SCNC: 2.9 MMOL/L (ref 3.5–5.1)
POTASSIUM SERPL-SCNC: 3.6 MMOL/L (ref 3.5–5.1)
PROT SERPL-MCNC: 6 GM/DL (ref 6–8.4)
PROT UR QL STRIP: NEGATIVE
RBC # BLD AUTO: 3.08 M/UL (ref 4–5.4)
RELATIVE EOSINOPHIL (OHS): 1.2 %
RELATIVE LYMPHOCYTE (OHS): 19.7 % (ref 18–48)
RELATIVE MONOCYTE (OHS): 12.1 % (ref 4–15)
RELATIVE NEUTROPHIL (OHS): 65.4 % (ref 38–73)
SODIUM SERPL-SCNC: 127 MMOL/L (ref 136–145)
SODIUM SERPL-SCNC: 128 MMOL/L (ref 136–145)
SP GR UR STRIP: 1.01
SPECIMEN SOURCE: ABNORMAL
TROPONIN I SERPL HS-MCNC: <3 NG/L
UROBILINOGEN UR STRIP-ACNC: NEGATIVE EU/DL
WBC # BLD AUTO: 5.03 K/UL (ref 3.9–12.7)

## 2025-08-29 PROCEDURE — 97165 OT EVAL LOW COMPLEX 30 MIN: CPT | Mod: HCNC

## 2025-08-29 PROCEDURE — 97535 SELF CARE MNGMENT TRAINING: CPT | Mod: HCNC

## 2025-08-29 PROCEDURE — 97530 THERAPEUTIC ACTIVITIES: CPT | Mod: HCNC

## 2025-08-29 PROCEDURE — 94761 N-INVAS EAR/PLS OXIMETRY MLT: CPT | Mod: HCNC,XB

## 2025-08-29 PROCEDURE — 83735 ASSAY OF MAGNESIUM: CPT | Mod: 91,HCNC | Performed by: HOSPITALIST

## 2025-08-29 PROCEDURE — 96366 THER/PROPH/DIAG IV INF ADDON: CPT | Mod: HCNC

## 2025-08-29 PROCEDURE — 96365 THER/PROPH/DIAG IV INF INIT: CPT | Mod: HCNC

## 2025-08-29 PROCEDURE — 96372 THER/PROPH/DIAG INJ SC/IM: CPT | Mod: 59 | Performed by: HOSPITALIST

## 2025-08-29 PROCEDURE — 85025 COMPLETE CBC W/AUTO DIFF WBC: CPT | Mod: HCNC | Performed by: EMERGENCY MEDICINE

## 2025-08-29 PROCEDURE — 83880 ASSAY OF NATRIURETIC PEPTIDE: CPT | Mod: HCNC | Performed by: EMERGENCY MEDICINE

## 2025-08-29 PROCEDURE — 63600175 PHARM REV CODE 636 W HCPCS: Mod: HCNC | Performed by: EMERGENCY MEDICINE

## 2025-08-29 PROCEDURE — 86803 HEPATITIS C AB TEST: CPT | Mod: HCNC | Performed by: PHYSICIAN ASSISTANT

## 2025-08-29 PROCEDURE — 81003 URINALYSIS AUTO W/O SCOPE: CPT | Mod: HCNC | Performed by: EMERGENCY MEDICINE

## 2025-08-29 PROCEDURE — 84100 ASSAY OF PHOSPHORUS: CPT | Mod: HCNC | Performed by: EMERGENCY MEDICINE

## 2025-08-29 PROCEDURE — 25500020 PHARM REV CODE 255: Mod: HCNC | Performed by: EMERGENCY MEDICINE

## 2025-08-29 PROCEDURE — 96360 HYDRATION IV INFUSION INIT: CPT | Mod: XS

## 2025-08-29 PROCEDURE — 63600175 PHARM REV CODE 636 W HCPCS: Mod: HCNC | Performed by: HOSPITALIST

## 2025-08-29 PROCEDURE — 25000003 PHARM REV CODE 250: Mod: HCNC | Performed by: EMERGENCY MEDICINE

## 2025-08-29 PROCEDURE — 99900035 HC TECH TIME PER 15 MIN (STAT): Mod: HCNC

## 2025-08-29 PROCEDURE — 93010 ELECTROCARDIOGRAM REPORT: CPT | Mod: HCNC,,, | Performed by: INTERNAL MEDICINE

## 2025-08-29 PROCEDURE — 82803 BLOOD GASES ANY COMBINATION: CPT | Mod: HCNC

## 2025-08-29 PROCEDURE — 83735 ASSAY OF MAGNESIUM: CPT | Mod: HCNC | Performed by: EMERGENCY MEDICINE

## 2025-08-29 PROCEDURE — G0378 HOSPITAL OBSERVATION PER HR: HCPCS | Mod: HCNC

## 2025-08-29 PROCEDURE — 84484 ASSAY OF TROPONIN QUANT: CPT | Mod: HCNC | Performed by: EMERGENCY MEDICINE

## 2025-08-29 PROCEDURE — 80053 COMPREHEN METABOLIC PANEL: CPT | Mod: HCNC | Performed by: EMERGENCY MEDICINE

## 2025-08-29 PROCEDURE — 83605 ASSAY OF LACTIC ACID: CPT | Mod: HCNC

## 2025-08-29 PROCEDURE — 87040 BLOOD CULTURE FOR BACTERIA: CPT | Mod: HCNC | Performed by: EMERGENCY MEDICINE

## 2025-08-29 PROCEDURE — 99285 EMERGENCY DEPT VISIT HI MDM: CPT | Mod: 25,HCNC

## 2025-08-29 PROCEDURE — 25000003 PHARM REV CODE 250: Mod: HCNC | Performed by: HOSPITALIST

## 2025-08-29 PROCEDURE — 93005 ELECTROCARDIOGRAM TRACING: CPT | Mod: HCNC

## 2025-08-29 RX ORDER — TALC
6 POWDER (GRAM) TOPICAL NIGHTLY PRN
Status: DISCONTINUED | OUTPATIENT
Start: 2025-08-29 | End: 2025-08-29 | Stop reason: HOSPADM

## 2025-08-29 RX ORDER — AMOXICILLIN 250 MG
1 CAPSULE ORAL 2 TIMES DAILY
Status: DISCONTINUED | OUTPATIENT
Start: 2025-08-29 | End: 2025-08-29 | Stop reason: HOSPADM

## 2025-08-29 RX ORDER — PANTOPRAZOLE SODIUM 40 MG/1
40 TABLET, DELAYED RELEASE ORAL DAILY
Status: DISCONTINUED | OUTPATIENT
Start: 2025-08-29 | End: 2025-08-29 | Stop reason: HOSPADM

## 2025-08-29 RX ORDER — IBUPROFEN 200 MG
16 TABLET ORAL
Status: DISCONTINUED | OUTPATIENT
Start: 2025-08-29 | End: 2025-08-29 | Stop reason: HOSPADM

## 2025-08-29 RX ORDER — LEVETIRACETAM 750 MG/1
750 TABLET ORAL 2 TIMES DAILY
Status: DISCONTINUED | OUTPATIENT
Start: 2025-08-29 | End: 2025-08-29 | Stop reason: HOSPADM

## 2025-08-29 RX ORDER — LEVOTHYROXINE SODIUM 50 UG/1
50 TABLET ORAL
Status: DISCONTINUED | OUTPATIENT
Start: 2025-08-29 | End: 2025-08-29 | Stop reason: HOSPADM

## 2025-08-29 RX ORDER — INSULIN GLARGINE 100 [IU]/ML
15 INJECTION, SOLUTION SUBCUTANEOUS 2 TIMES DAILY
Status: DISCONTINUED | OUTPATIENT
Start: 2025-08-29 | End: 2025-08-29 | Stop reason: HOSPADM

## 2025-08-29 RX ORDER — POTASSIUM CHLORIDE 20 MEQ/1
40 TABLET, EXTENDED RELEASE ORAL ONCE
Status: COMPLETED | OUTPATIENT
Start: 2025-08-29 | End: 2025-08-29

## 2025-08-29 RX ORDER — GLUCAGON 1 MG
1 KIT INJECTION
Status: DISCONTINUED | OUTPATIENT
Start: 2025-08-29 | End: 2025-08-29 | Stop reason: HOSPADM

## 2025-08-29 RX ORDER — TALC
6 POWDER (GRAM) TOPICAL NIGHTLY PRN
Status: DISCONTINUED | OUTPATIENT
Start: 2025-08-29 | End: 2025-08-29

## 2025-08-29 RX ORDER — MEMANTINE HYDROCHLORIDE 5 MG/1
5 TABLET ORAL 2 TIMES DAILY
Status: DISCONTINUED | OUTPATIENT
Start: 2025-08-29 | End: 2025-08-29 | Stop reason: HOSPADM

## 2025-08-29 RX ORDER — NALOXONE HCL 0.4 MG/ML
0.02 VIAL (ML) INJECTION
Status: DISCONTINUED | OUTPATIENT
Start: 2025-08-29 | End: 2025-08-29 | Stop reason: HOSPADM

## 2025-08-29 RX ORDER — SODIUM CHLORIDE 0.9 % (FLUSH) 0.9 %
10 SYRINGE (ML) INJECTION EVERY 12 HOURS PRN
Status: DISCONTINUED | OUTPATIENT
Start: 2025-08-29 | End: 2025-08-29 | Stop reason: HOSPADM

## 2025-08-29 RX ORDER — SYRING-NEEDL,DISP,INSUL,0.3 ML 29 G X1/2"
296 SYRINGE, EMPTY DISPOSABLE MISCELLANEOUS ONCE
Status: COMPLETED | OUTPATIENT
Start: 2025-08-29 | End: 2025-08-29

## 2025-08-29 RX ORDER — SYRING-NEEDL,DISP,INSUL,0.3 ML 29 G X1/2"
148 SYRINGE, EMPTY DISPOSABLE MISCELLANEOUS ONCE
Qty: 296 ML | Refills: 2 | Status: SHIPPED | OUTPATIENT
Start: 2025-08-29 | End: 2025-08-29

## 2025-08-29 RX ORDER — IBUPROFEN 200 MG
24 TABLET ORAL
Status: DISCONTINUED | OUTPATIENT
Start: 2025-08-29 | End: 2025-08-29 | Stop reason: HOSPADM

## 2025-08-29 RX ORDER — INSULIN ASPART 100 [IU]/ML
0-5 INJECTION, SOLUTION INTRAVENOUS; SUBCUTANEOUS
Status: DISCONTINUED | OUTPATIENT
Start: 2025-08-29 | End: 2025-08-29 | Stop reason: HOSPADM

## 2025-08-29 RX ORDER — SODIUM CHLORIDE 0.9 % (FLUSH) 0.9 %
10 SYRINGE (ML) INJECTION
Status: DISCONTINUED | OUTPATIENT
Start: 2025-08-29 | End: 2025-08-29 | Stop reason: HOSPADM

## 2025-08-29 RX ORDER — ACETAMINOPHEN 500 MG
1000 TABLET ORAL EVERY 8 HOURS PRN
Status: DISCONTINUED | OUTPATIENT
Start: 2025-08-29 | End: 2025-08-29 | Stop reason: HOSPADM

## 2025-08-29 RX ORDER — DULOXETIN HYDROCHLORIDE 60 MG/1
60 CAPSULE, DELAYED RELEASE ORAL DAILY
Status: DISCONTINUED | OUTPATIENT
Start: 2025-08-29 | End: 2025-08-29 | Stop reason: HOSPADM

## 2025-08-29 RX ORDER — ACETAMINOPHEN 325 MG/1
650 TABLET ORAL EVERY 4 HOURS PRN
Status: DISCONTINUED | OUTPATIENT
Start: 2025-08-29 | End: 2025-08-29 | Stop reason: HOSPADM

## 2025-08-29 RX ORDER — MAGNESIUM SULFATE HEPTAHYDRATE 40 MG/ML
2 INJECTION, SOLUTION INTRAVENOUS ONCE
Status: COMPLETED | OUTPATIENT
Start: 2025-08-29 | End: 2025-08-29

## 2025-08-29 RX ADMIN — LEVETIRACETAM 750 MG: 750 TABLET, FILM COATED ORAL at 09:08

## 2025-08-29 RX ADMIN — DULOXETINE HYDROCHLORIDE 60 MG: 60 CAPSULE, DELAYED RELEASE ORAL at 09:08

## 2025-08-29 RX ADMIN — MAGNESIUM SULFATE HEPTAHYDRATE 2 G: 40 INJECTION, SOLUTION INTRAVENOUS at 04:08

## 2025-08-29 RX ADMIN — POTASSIUM CHLORIDE 40 MEQ: 1500 TABLET, EXTENDED RELEASE ORAL at 02:08

## 2025-08-29 RX ADMIN — SENNOSIDES, DOCUSATE SODIUM 1 TABLET: 50; 8.6 TABLET, FILM COATED ORAL at 10:08

## 2025-08-29 RX ADMIN — POTASSIUM BICARBONATE 50 MEQ: 977.5 TABLET, EFFERVESCENT ORAL at 04:08

## 2025-08-29 RX ADMIN — SODIUM CHLORIDE 250 ML: 9 INJECTION, SOLUTION INTRAVENOUS at 10:08

## 2025-08-29 RX ADMIN — MEMANTINE HYDROCHLORIDE 5 MG: 5 TABLET ORAL at 09:08

## 2025-08-29 RX ADMIN — LINACLOTIDE 72 MCG: 72 CAPSULE, GELATIN COATED ORAL at 09:08

## 2025-08-29 RX ADMIN — SODIUM CHLORIDE, POTASSIUM CHLORIDE, SODIUM LACTATE AND CALCIUM CHLORIDE 500 ML: 600; 310; 30; 20 INJECTION, SOLUTION INTRAVENOUS at 02:08

## 2025-08-29 RX ADMIN — IOHEXOL 75 ML: 350 INJECTION, SOLUTION INTRAVENOUS at 03:08

## 2025-08-29 RX ADMIN — INSULIN GLARGINE 15 UNITS: 100 INJECTION, SOLUTION SUBCUTANEOUS at 09:08

## 2025-08-29 RX ADMIN — LEVOTHYROXINE SODIUM 50 MCG: 0.05 TABLET ORAL at 09:08

## 2025-08-29 RX ADMIN — PANTOPRAZOLE SODIUM 40 MG: 40 TABLET, DELAYED RELEASE ORAL at 09:08

## 2025-08-29 RX ADMIN — MAGNESIUM CITRATE 296 ML: 1.75 LIQUID ORAL at 10:08

## 2025-08-29 RX ADMIN — APIXABAN 5 MG: 5 TABLET, FILM COATED ORAL at 09:08

## 2025-08-30 LAB — HOLD SPECIMEN: NORMAL

## 2025-09-01 LAB — HOLD SPECIMEN: NORMAL

## 2025-09-02 ENCOUNTER — OFFICE VISIT (OUTPATIENT)
Facility: CLINIC | Age: 80
End: 2025-09-02
Payer: MEDICARE

## 2025-09-02 ENCOUNTER — PATIENT OUTREACH (OUTPATIENT)
Dept: ADMINISTRATIVE | Facility: CLINIC | Age: 80
End: 2025-09-02
Payer: MEDICARE

## 2025-09-02 DIAGNOSIS — M65.352 TRIGGER FINGER OF ALL DIGITS OF LEFT HAND: ICD-10-CM

## 2025-09-02 DIAGNOSIS — M65.322 TRIGGER FINGER OF ALL DIGITS OF LEFT HAND: ICD-10-CM

## 2025-09-02 DIAGNOSIS — M65.312 TRIGGER FINGER OF ALL DIGITS OF LEFT HAND: ICD-10-CM

## 2025-09-02 DIAGNOSIS — M79.642 PAIN OF LEFT HAND: ICD-10-CM

## 2025-09-02 DIAGNOSIS — M65.332 TRIGGER FINGER OF ALL DIGITS OF LEFT HAND: ICD-10-CM

## 2025-09-02 DIAGNOSIS — M65.342 TRIGGER FINGER OF ALL DIGITS OF LEFT HAND: ICD-10-CM

## 2025-09-02 DIAGNOSIS — Z98.890 POST-OPERATIVE STATE: Primary | ICD-10-CM

## 2025-09-02 PROCEDURE — 1157F ADVNC CARE PLAN IN RCRD: CPT | Mod: CPTII,HCNC,S$GLB,

## 2025-09-02 PROCEDURE — 1159F MED LIST DOCD IN RCRD: CPT | Mod: CPTII,HCNC,S$GLB,

## 2025-09-02 PROCEDURE — 99024 POSTOP FOLLOW-UP VISIT: CPT | Mod: HCNC,S$GLB,,

## 2025-09-02 PROCEDURE — 99999 PR PBB SHADOW E&M-EST. PATIENT-LVL III: CPT | Mod: PBBFAC,HCNC,,

## 2025-09-02 PROCEDURE — 1160F RVW MEDS BY RX/DR IN RCRD: CPT | Mod: CPTII,HCNC,S$GLB,

## 2025-09-03 ENCOUNTER — TELEPHONE (OUTPATIENT)
Dept: FAMILY MEDICINE | Facility: CLINIC | Age: 80
End: 2025-09-03
Payer: MEDICARE

## 2025-09-03 ENCOUNTER — NURSE TRIAGE (OUTPATIENT)
Dept: ADMINISTRATIVE | Facility: CLINIC | Age: 80
End: 2025-09-03
Payer: MEDICARE

## 2025-09-03 LAB
BACTERIA BLD CULT: NORMAL
BACTERIA BLD CULT: NORMAL

## 2025-09-05 ENCOUNTER — HOSPITAL ENCOUNTER (OUTPATIENT)
Dept: RADIOLOGY | Facility: HOSPITAL | Age: 80
Discharge: HOME | End: 2025-09-05
Payer: MEDICARE

## 2025-09-05 DIAGNOSIS — R10.9 RIGHT FLANK PAIN: ICD-10-CM

## 2025-09-05 PROCEDURE — 76770 US EXAM ABDO BACK WALL COMP: CPT | Mod: 26,HCNC,, | Performed by: RADIOLOGY

## 2025-09-05 PROCEDURE — 76770 US EXAM ABDO BACK WALL COMP: CPT | Mod: TC,HCNC

## (undated) DEVICE — GLOVE BIOGEL PI MICRO SZ 6.5

## (undated) DEVICE — UNDERGLOVES BIOGEL PI SIZE 8

## (undated) DEVICE — DRESSING LEUKOPLAST FLEX 1X3IN

## (undated) DEVICE — BANDAGE ADHESIVE

## (undated) DEVICE — PAD PREP CUFFED NS 24X48IN

## (undated) DEVICE — Device

## (undated) DEVICE — BANDAGE MATRIX HK LOOP 2IN 5YD

## (undated) DEVICE — PAD CAST 2 IN X 4YDS STERILE

## (undated) DEVICE — GLOVE SENSICARE PI GRN 6.5

## (undated) DEVICE — TOWEL OR DISP STRL BLUE 4/PK

## (undated) DEVICE — SLING ARM MEDIUM FOAM STRAP

## (undated) DEVICE — COVER CAMERA OPERATING ROOM

## (undated) DEVICE — SPONGE COTTON TRAY 4X4IN

## (undated) DEVICE — SOL IRR SOD CHL .9% POUR

## (undated) DEVICE — DRAPE STERI-DRAPE 1000 17X11IN

## (undated) DEVICE — DRESSING TRANS 2X2 TEGADERM

## (undated) DEVICE — SUT 4-0 ETHILON 18 PS-2

## (undated) DEVICE — GOWN ECLIPSE REINF LVL4 TWL XL

## (undated) DEVICE — BLADE SURG #15 CARBON STEEL

## (undated) DEVICE — GLOVE BIOGEL PI MICRO SZ 7.5